# Patient Record
Sex: MALE | Race: WHITE | NOT HISPANIC OR LATINO | Employment: OTHER | URBAN - METROPOLITAN AREA
[De-identification: names, ages, dates, MRNs, and addresses within clinical notes are randomized per-mention and may not be internally consistent; named-entity substitution may affect disease eponyms.]

---

## 2017-01-07 ENCOUNTER — GENERIC CONVERSION - ENCOUNTER (OUTPATIENT)
Dept: OTHER | Facility: OTHER | Age: 77
End: 2017-01-07

## 2017-01-08 LAB
CREATININE, URINE (HISTORICAL): 205 MG/DL
PROT/CREAT UR: 233 MG/G CREAT (ref 0–200)
TOTAL PROTEIN (HISTORICAL): 47.8 MG/DL

## 2017-01-11 ENCOUNTER — GENERIC CONVERSION - ENCOUNTER (OUTPATIENT)
Dept: OTHER | Facility: OTHER | Age: 77
End: 2017-01-11

## 2017-01-11 ENCOUNTER — ALLSCRIPTS OFFICE VISIT (OUTPATIENT)
Dept: OTHER | Facility: OTHER | Age: 77
End: 2017-01-11

## 2017-01-25 DIAGNOSIS — N18.30 CHRONIC KIDNEY DISEASE, STAGE III (MODERATE) (HCC): ICD-10-CM

## 2017-01-25 DIAGNOSIS — M10.9 GOUT: ICD-10-CM

## 2017-01-25 LAB
BUN SERPL-MCNC: 17 MG/DL (ref 8–27)
BUN/CREA RATIO (HISTORICAL): 14 (ref 10–22)
CALCIUM SERPL-MCNC: 9.2 MG/DL (ref 8.6–10.2)
CHLORIDE SERPL-SCNC: 103 MMOL/L (ref 96–106)
CO2 SERPL-SCNC: 23 MMOL/L (ref 18–29)
CREAT SERPL-MCNC: 1.22 MG/DL (ref 0.76–1.27)
EGFR AFRICAN AMERICAN (HISTORICAL): 66 ML/MIN/1.73
EGFR-AMERICAN CALC (HISTORICAL): 57 ML/MIN/1.73
GLUCOSE SERPL-MCNC: 122 MG/DL (ref 65–99)
POTASSIUM SERPL-SCNC: 4.9 MMOL/L (ref 3.5–5.2)
SODIUM SERPL-SCNC: 141 MMOL/L (ref 134–144)

## 2017-02-02 ENCOUNTER — GENERIC CONVERSION - ENCOUNTER (OUTPATIENT)
Dept: OTHER | Facility: OTHER | Age: 77
End: 2017-02-02

## 2017-03-13 ENCOUNTER — ALLSCRIPTS OFFICE VISIT (OUTPATIENT)
Dept: OTHER | Facility: OTHER | Age: 77
End: 2017-03-13

## 2017-03-17 ENCOUNTER — ALLSCRIPTS OFFICE VISIT (OUTPATIENT)
Dept: OTHER | Facility: OTHER | Age: 77
End: 2017-03-17

## 2017-06-13 ENCOUNTER — ALLSCRIPTS OFFICE VISIT (OUTPATIENT)
Dept: OTHER | Facility: OTHER | Age: 77
End: 2017-06-13

## 2017-06-15 ENCOUNTER — GENERIC CONVERSION - ENCOUNTER (OUTPATIENT)
Dept: OTHER | Facility: OTHER | Age: 77
End: 2017-06-15

## 2017-06-15 LAB
BUN SERPL-MCNC: 16 MG/DL (ref 8–27)
BUN/CREA RATIO (HISTORICAL): 12 (ref 10–24)
CALCIUM SERPL-MCNC: 9.4 MG/DL (ref 8.6–10.2)
CHLORIDE SERPL-SCNC: 100 MMOL/L (ref 96–106)
CO2 SERPL-SCNC: 25 MMOL/L (ref 18–29)
CREAT SERPL-MCNC: 1.31 MG/DL (ref 0.76–1.27)
EGFR AFRICAN AMERICAN (HISTORICAL): 60 ML/MIN/1.73
EGFR-AMERICAN CALC (HISTORICAL): 52 ML/MIN/1.73
GLUCOSE SERPL-MCNC: 126 MG/DL (ref 65–99)
PHOSPHATE SERPL-MCNC: 2.7 MG/DL (ref 2.5–4.5)
POTASSIUM SERPL-SCNC: 4.7 MMOL/L (ref 3.5–5.2)
SODIUM SERPL-SCNC: 139 MMOL/L (ref 134–144)

## 2017-06-16 LAB
BACTERIA UR QL AUTO: ABNORMAL
BILIRUB UR QL STRIP: NEGATIVE
COLOR UR: YELLOW
COMMENT (HISTORICAL): CLEAR
CREATININE, URINE (HISTORICAL): 179.9 MG/DL
FECAL OCCULT BLOOD DIAGNOSTIC (HISTORICAL): NEGATIVE
GLUCOSE (HISTORICAL): NEGATIVE
KETONES UR STRIP-MCNC: NEGATIVE MG/DL
LEUKOCYTE ESTERASE UR QL STRIP: ABNORMAL
MICROSCOPIC EXAMINATION (HISTORICAL): ABNORMAL
NITRITE UR QL STRIP: NEGATIVE
NON-SQ EPI CELLS URNS QL MICRO: ABNORMAL /HPF
PH UR STRIP.AUTO: 6.5 [PH] (ref 5–7.5)
PROT UR STRIP-MCNC: ABNORMAL MG/DL
PROT/CREAT UR: 140 MG/G CREAT (ref 0–200)
RBC (HISTORICAL): ABNORMAL /HPF
SP GR UR STRIP.AUTO: 1.02 (ref 1–1.03)
TOTAL PROTEIN (HISTORICAL): 25.1 MG/DL
UROBILINOGEN UR QL STRIP.AUTO: 0.2 EU/DL (ref 0.2–1)
WBC # BLD AUTO: ABNORMAL /HPF

## 2017-06-27 ENCOUNTER — ALLSCRIPTS OFFICE VISIT (OUTPATIENT)
Dept: OTHER | Facility: OTHER | Age: 77
End: 2017-06-27

## 2017-08-21 ENCOUNTER — ALLSCRIPTS OFFICE VISIT (OUTPATIENT)
Dept: OTHER | Facility: OTHER | Age: 77
End: 2017-08-21

## 2017-08-21 ENCOUNTER — GENERIC CONVERSION - ENCOUNTER (OUTPATIENT)
Dept: OTHER | Facility: OTHER | Age: 77
End: 2017-08-21

## 2017-09-13 ENCOUNTER — ALLSCRIPTS OFFICE VISIT (OUTPATIENT)
Dept: OTHER | Facility: OTHER | Age: 77
End: 2017-09-13

## 2017-11-22 ENCOUNTER — ALLSCRIPTS OFFICE VISIT (OUTPATIENT)
Dept: OTHER | Facility: OTHER | Age: 77
End: 2017-11-22

## 2017-11-23 NOTE — PROGRESS NOTES
Assessment  1  Acquired ankle/foot deformity (736 70) (M21 969)   2  Onychomycosis (110 1) (B35 1)   3  Difficulty in walking (719 7) (R26 2)    Plan     · Follow-up visit in 9 weeks Evaluation and Treatment  Follow-up  Status: Hold For -Scheduling  Requested for: 72WYQ6352   · There are many things you can do at home to ensure good foot health ; Status:Complete;  Done: 90WDE1287 11:09AM   · Wear shoes that give your toes plenty of room ; Status:Complete;   Done: 87KHQ301901:18IC    Discussion/Summary  The patient, patient's family was counseled regarding instructions for management,-- patient and family education  Patient is able to Self-Care  The treatment plan was reviewed with the patient/guardian  The patient/guardian understands and agrees with the treatment plan      Chief Complaint  Patient has pain in both feet  History of Present Illness  HPI: Agent has continued bilateral lower extremity pain  He is taking Lyrica  He takes Percocet when necessary  He has back pain  His legs feel heavy and weak  Review of Systems   Constitutional: no fever-- and-- no chills  ENT: no nasal discharge  Cardiovascular: no palpitations  Gastrointestinal: no constipation  Genitourinary: no urinary hesitancy  Integumentary: no rashes  Neurological: no headache-- and-- no dizziness  Other Symptoms: anxiety insomnia  Active Problems    1  Abrasion (919 0) (T14 8XXA)   2  Acquired ankle/foot deformity (736 70) (M21 969)   3  Acute gout (274 01) (M10 9)   4  Acute gouty arthritis (274 01) (M10 9)   5  Allergic rhinitis (477 9) (J30 9)   6  Aneurysm of abdominal aorta (441 4) (I71 4)   7  Angina pectoris (413 9) (I20 9)   8  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) (F06 4)   9  Arteriosclerosis of coronary artery (414 00) (I25 10)   10  Arthralgia of ankle or foot (719 47) (M25 579)   11  Atherosclerosis of arteries of extremities (440 20) (I70 209)   12   Atrial fibrillation, unspecified type (427 31) (I48 91)   13  Atrial flutter (427 32) (I48 92)   14  Benign hypertension with CKD (chronic kidney disease) stage III (403 10,585 3)  (I12 9,N18 3)   15  Benign prostatic hypertrophy (600 00) (N40 0)   16  Bilateral edema of lower extremity (782 3) (R60 0)   17  Bilateral lumbar radiculopathy (724 4) (M54 16)   18  Blurred vision (368 8) (H53 8)   19  Body mass index (BMI) of 45 0 to 49 9 in adult (V85 42) (Z68 42)   20  Callus (700) (L84)   21  Carpal tunnel syndrome, unspecified laterality (354 0) (G56 00)   22  Cellulitis (682 9) (L03 90)   23  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)   24  Chronic low back pain (724 2,338 29) (M54 5,G89 29)   25  Chronic obstructive pulmonary disease (496) (J44 9)   26  Chronic pain syndrome (338 4) (G89 4)   27  CKD (chronic kidney disease), stage III (585 3) (N18 3)   28  Colon cancer screening (V76 51) (Z12 11)   29  Colon, diverticulosis (562 10) (K57 30)   30  Congestive heart failure (428 0) (I50 9)   31  Coronary artery disease (414 00) (I25 10)   32  Deep venous insufficiency (459 81) (I87 2)   33  Degenerative disc disease, lumbar (722 52) (M51 36)   34  Difficulty in walking (719 7) (R26 2)   35  Encounter for special screening examination for genitourinary disorder (V81 6) (Z13 89)   36  Fecal soiling (787 62) (R15 1)   37  Fibromyalgia (729 1) (M79 7)   38  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 3)   39  Gastrointestinal symptoms (787 99) (R19 8)   40  Gout (274 9) (M10 9)   41  Hyperkalemia (276 7) (E87 5)   42  Hyperlipidemia (272 4) (E78 5)   43  Insomnia, persistent (307 42) (G47 00)   44  Lumbar canal stenosis (724 02) (M48 061)   45  Lumbar neuritis (724 4) (M54 16)   46  Memory loss (780 93) (R41 3)   47  Microscopic hematuria (599 72) (R31 29)   48  Moderate or severe vision impairment, one eye (369 60) (H54 40)   49  Morbid or severe obesity due to excess calories (278 01) (E66 01)   50   Morbid or severe obesity due to excess calories (278 01) (E66 01)   51  Needs flu shot (V04 81) (Z23)   52  Nicotine dependence (305 1) (F17 200)   53  Olecranon bursitis (726 33) (M70 20)   54  Onychogryphosis (703 8)   55  Onychomycosis (110 1) (B35 1)   56  Osteoarthritis, knee/lower leg (715 96) (M17 9)   57  Pacemaker Permanent Placement Dual-Chamber   58  Pain in joints of both feet (719 47) (M79 671,M79 672)   59  Paronychia of toe, unspecified laterality (681 11) (L03 039)   60  Pes planus, congenital (754 61) (Q66 50)   61  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)   62  Presence of cardiac pacemaker (V45 01) (Z95 0)   63  Proteinuria (791 0) (R80 9)   64  Radiculopathy (729 2) (M54 10)   65  Screening for genitourinary condition (V81 6) (Z13 89)   66  Skin tear of right lower leg without complication, initial encounter (891 0) (S81 811A)   67  Sleep apnea (780 57) (G47 30)   68  Sleep apnea (780 57) (G47 30)   69  Tenderness in limb (729 5) (M79 609)   70  Tendonitis (726 90) (M77 9)   71  Tinea corporis (110 5) (B35 4)   72  Urinary incontinence (788 30) (R32)   73  Venous insufficiency (chronic) (peripheral) (459 81) (I87 2)    Past Medical History   · History of Atrial flutter (427 32) (I48 92)   · History of Cellulitis of foot (682 7) (L03 119)   · History of Colon cancer screening (V76 51) (Z12 11)   · History of Difficulty in walking (719 7) (R26 2)   · History of Difficulty in walking (719 7) (R26 2)   · General medical examination (V70 9) (Z00 00)   · History of backache (V13 59) (Z87 39)   · History of radiculopathy (V12 49) (Z86 69)   · 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)    The active problems and past medical history were reviewed and updated today  Surgical History   · History of Cath Stent Placement   · History of Cholecystectomy   · History of Knee Surgery   · Pacemaker Permanent Placement Dual-Chamber   · History of Permanent Pacemaker Implantation Date    The surgical history was reviewed and updated today         Family History  Mother    · Family history of Cancer   · Family history of Family Health Status Of Mother -    · Family history of Heart Disease (V17 49)   · Family history of Hypertension (V17 49)   · Family history of Mother  At Age 80  Father    · Family history of Father  At Age 47   · Family history of Heart Disease (V17 49)  Brother    · Family history of Heart Disease (V17 49)    The family history was reviewed and updated today  Social History     · Denied: Alcohol Use (History)   · Being A Social Drinker   · Cigarette smoker (305 1) (F17 210)   · Current Every Day Smoker (305 1)   · Current Smoker (V1 82)   · Daily Tea Consumption (10  Cups/Day)   · Denied: Drug Use (305 90)   · Marital History - Currently    · Recently Stopping Smoking ()  The social history was reviewed and updated today  The social history was reviewed and is unchanged  Current Meds   1  Allopurinol 100 MG Oral Tablet; TAKE ONE TABLET BY MOUTH EVERY DAY FOR GOUT; Therapy: 91WKH5826 to (Evaluate:34Rfm1818)  Requested for: 2017; Last Rx:2017 Ordered   2  Amoxicillin 500 MG Oral Tablet; 4 po 1 hour prior to procedure  Requested for: 17HHL6131; Last Rx:2017 Ordered   3  B Complex Oral Capsule; TAKE 1 CAPSULE DAILY; Therapy: (Recorded:2014) to Recorded   4  Betamethasone Dipropionate 0 05 % External Cream; APPLY SPARINGLY TO AFFECTED AREA(S) TWICE DAILY; Therapy: 44Zpl6794 to (Last Rx:66Xaq4270)  Requested for: 61Tkl6536 Ordered   5  Biotin 1000 MCG Oral Tablet; Take 1 tablet daily; Therapy: (Recorded:2014) to Recorded   6  Calcium-Carb 600 600 MG Oral Tablet; TAKE 2 TABLETS DAILY; Therapy: 93DKP2684 to  Requested for: 97Hrr8912 Recorded   7  Centrum Silver Ultra Mens Oral Tablet; 1 tab daily; Therapy: 86DSW1166 to  Requested for: 33PBM0770 Recorded   8  Cinnamon 500 MG Oral Capsule; Take 1 capsule twice daily; Therapy: (Recorded:2014) to Recorded   9   ClonazePAM 1 MG Oral Tablet Disintegrating; PLACE 1 TABLET ON TONGUE AND ALLOW TO DISSOLVE TWICE DAILY AS NEEDED; Therapy: 05SJX9989 to (Evaluate:12Hms1604); Last Rx:41Ope1804 Ordered   10  Clopidogrel Bisulfate 75 MG Oral Tablet; take one tablet by mouth every day; Therapy: 07TKD2580 to (Evaluate:38Ehg3993)  Requested for: 11Aug2017; Last  Rx:11Aug2017 Ordered   11  Clotrimazole 1 % External Cream; APPLY 2-3 TIMES DAILY TO AFFECTED AREA(S); Therapy: 51Pbd0763 to (Last Rx:47Uvl9506)  Requested for: 70Nlk8307 Ordered   12  Colcrys 0 6 MG Oral Tablet; TAKE 1 TABLET DAILY AS DIRECTED; Therapy: 27AQF0159 to (Evaluate:21Jun2016); Last Rx:24Mar2016 Ordered   13  Cranberry Fruit Concentrate 94864 MG Oral Capsule; Take 1 capsule twice daily  Recorded   14  [employer] Fish Oil CAPS; TAKE 1 CAPSULE Daily Recorded   15  Donepezil HCl - 5 MG Oral Tablet; 1 every day; Therapy: 32Dgn8258 to (Last Rx:21Aug2017)  Requested for: 94Mev0071 Ordered   16  Felodipine ER 5 MG Oral Tablet Extended Release 24 Hour; take 1 tablet by mouth once  daily; Therapy: 84IZF3475 to (YBHUIOSE:03KZL1937)  Requested for: 24Jul2017; Last  Rx:85Wcq3412 Ordered   17  Finasteride 5 MG Oral Tablet; 1 DAILY; Therapy: 64Qsp6445 to (Last Rx:19Jrr7881)  Requested for: 99Tdx5389 Ordered   18  Flax Seed Oil 1000 MG Oral Capsule; 2 tabs daily; Therapy: 71IWT3986 to  Requested for: 19EGX6762 Recorded   19  Ginkgo Biloba 120 MG Oral Capsule; Take 1 capsule twice daily; Therapy: (Recorded:21Apr2014) to Recorded   20  Glucosamine 1500 Complex Oral Capsule; TAKE 2 CAPSULE Twice daily; Therapy: (Recorded:06Ykw2671) to Recorded   21  Lactaid 4500 UNIT CHEW; CHEW AND SWALLOW 1 TO 3 TABLETS WITH FIRST BITE  OF MILK FOOD; Therapy: (Recorded:21Apr2014) to Recorded   22  Magnesium 500 MG Oral Capsule; take 1 capsule daily; Therapy: (Recorded:21Apr2014) to Recorded   23   Nitrostat 0 4 MG Sublingual Tablet Sublingual; DISSOLVE 1 TABLET UNDER THE  TONGUE AS NEEDED FOR CHEST PAIN;  Therapy: 87PGA4670 to (789 690 620)  Requested for: 25Lhz3817; Last  Rx:76Cnh5869 Ordered   24  PARoxetine HCl - 20 MG Oral Tablet; 1 EVERY MORNING; Therapy: 81PMU2948 to (Last Rx:99Vle2579)  Requested for: 54Dgj7826 Ordered   25  ProAir  (90 Base) MCG/ACT Inhalation Aerosol Solution; 2 puffs Q4 hours prn  SOB/wheeze; Therapy: 80SJA0867 to (Last Rx:19Nov2013)  Requested for: 42PCN0792 Ordered   26  Selenium  MCG Oral Tablet Extended Release; 1 tab daily; Therapy: 26KSN2848 to  Requested for: 33XFY6037 Recorded   27  Silver Sulfadiazine 1 % External Cream; APPLY TO AFFECTED AREA TWICE DAILY AS  DIRECTED; Therapy: 56FNM5266 to (Last Rx:06Apr2016)  Requested for: 40Ooa1237 Ordered   28  Simvastatin 20 MG Oral Tablet; 1 TAB DAILY; Therapy: 63IHO9990 to (Last Rx:80Zjq6399)  Requested for: 15Dlg1266 Ordered   29  TraMADol HCl - 50 MG Oral Tablet; TAKE 1 TABLET 4 TIMES DAILY AS NEEDED FOR  PAIN; Last Rx:61Cwq5655 Ordered   30  Vitamin C 500 MG Oral Tablet; TAKE 1 TABLET DAILY; Therapy: (Recorded:21Apr2014) to Recorded   31  Vitamin D 1000 UNIT Oral Tablet; TAKE 1 TABLET DAILY; Therapy: (Recorded:08Apr2014) to Recorded   32  Xarelto 20 MG Oral Tablet; One a day; Therapy: 39XOE0177 to (Last Rx:16Qaa1636) Ordered   33  Zinc 50 MG Oral Tablet; TAKE 1 TABLET DAILY; Therapy: (Recorded:08Apr2014) to Recorded    The medication list was reviewed and updated today  Allergies  1  No Known Drug Allergies    Vitals   Recorded: 22Nov2017 10:52AM   Heart Rate 86   Respiration 20   Systolic 121   Diastolic 84   Height 6 ft    Weight 360 lb    BMI Calculated 48 83   BSA Calculated 2 73     Physical Exam  Left Foot: Appearance: Normal except as noted: excessive pronation-- and-- pes planus  Second toe deformities include hammer toe  Third toe deformities include hammer toe  Forth toe deformities include hammer toe  Fifth toe deformities include hammer toe   Tenderness: None except the great toe,-- distal first metatarsal,-- distal fifth metatarsal-- and-- insertion of the plantar fascia  Positive Brian sign third left intermetatarsal space  ROM: Full  Motor: Normal   Right Foot: Appearance: Normal except as noted: excessive pronation-- and-- pes planus  Second toe deformities include hammer toe  Third toe deformities include hammer toe  Forth toe deformities include hammer toe  Fifth toe deformities include hammer toe  Evaluation of the great toe nail demonstrates an ingrown nail  Tenderness: None except the insertion of the plantar fascia  Left Ankle: Appearance: Normal except erythema,-- swelling-- and-- swelling laterally  Tenderness: None except the tibiotalar joint  Right Ankle: ROM: Full  Neurological Exam: performed  Deep tendon reflexes: + tinel of right tibial nerve  Vascular Exam: performed Dorsalis pedis pulses were present bilaterally  Posterior tibial pulses were present bilaterally  Elevation Pallor: absent bilaterally  Capillary refill time was less than 1 second bilaterally  Edema: moderate bilaterally-- and-- 4/7 pitting  neg  buddy  Toenails: All of the toenails were elongated,-- hypertrophied,-- discolored,-- ingrown,-- shown to have subungual debris,-- tender-- and-- Mycotic with onychauxis  Hyperkeratosis: present on both first toes,-- present on both first sub metatarsals-- and-- present on both fifth sub metatarsals  Shoe Gear Evaluation: performed ()  Recommendation(s): extra depth diabetic shoes  Procedure  All abnormal skin a mycotic nails debrided without complication her pain  Gentian violet applied to webspace is      Future Appointments    Date/Time Provider Specialty Site   01/31/2018 11:30 AM Elliott Heaton DPM Podiatry [facility]   12/21/2017 08:30 AM NICK Ch   Nephrology [facility] NEPHROLOGY [facility]       Signatures   Electronically signed by : Genesis Cardenas DPM; Nov 22 2017 11:11AM EST                       (Author)

## 2017-12-01 DIAGNOSIS — N18.30 CHRONIC KIDNEY DISEASE, STAGE III (MODERATE) (HCC): ICD-10-CM

## 2017-12-11 ENCOUNTER — GENERIC CONVERSION - ENCOUNTER (OUTPATIENT)
Dept: OTHER | Facility: OTHER | Age: 77
End: 2017-12-11

## 2017-12-11 LAB
A/G RATIO (HISTORICAL): 1.3 (ref 1.2–2.2)
ALBUMIN SERPL BCP-MCNC: 4.2 G/DL (ref 3.5–4.8)
ALP SERPL-CCNC: 63 IU/L (ref 39–117)
ALT SERPL W P-5'-P-CCNC: 23 IU/L (ref 0–44)
AST SERPL W P-5'-P-CCNC: 22 IU/L (ref 0–40)
BILIRUB SERPL-MCNC: 0.7 MG/DL (ref 0–1.2)
BUN SERPL-MCNC: 19 MG/DL (ref 8–27)
BUN/CREA RATIO (HISTORICAL): 13 (ref 10–24)
CALCIUM SERPL-MCNC: 9.5 MG/DL (ref 8.6–10.2)
CHLORIDE SERPL-SCNC: 101 MMOL/L (ref 96–106)
CHOLEST SERPL-MCNC: 137 MG/DL (ref 100–199)
CHOLEST/HDLC SERPL: 3.3 RATIO UNITS (ref 0–5)
CO2 SERPL-SCNC: 25 MMOL/L (ref 18–29)
CREAT SERPL-MCNC: 1.46 MG/DL (ref 0.76–1.27)
EGFR AFRICAN AMERICAN (HISTORICAL): 53 ML/MIN/1.73
EGFR-AMERICAN CALC (HISTORICAL): 46 ML/MIN/1.73
GLUCOSE SERPL-MCNC: 129 MG/DL (ref 65–99)
HDLC SERPL-MCNC: 41 MG/DL
INTERPRETATION (HISTORICAL): NORMAL
INTERPRETATION (HISTORICAL): NORMAL
LDLC SERPL CALC-MCNC: 76 MG/DL (ref 0–99)
PDF IMAGE (HISTORICAL): NORMAL
POTASSIUM SERPL-SCNC: 5.3 MMOL/L (ref 3.5–5.2)
SODIUM SERPL-SCNC: 143 MMOL/L (ref 134–144)
TOT. GLOBULIN, SERUM (HISTORICAL): 3.3 G/DL (ref 1.5–4.5)
TOTAL PROTEIN (HISTORICAL): 7.5 G/DL (ref 6–8.5)
TRIGL SERPL-MCNC: 102 MG/DL (ref 0–149)
VLDLC SERPL CALC-MCNC: 20 MG/DL (ref 5–40)

## 2017-12-12 ENCOUNTER — GENERIC CONVERSION - ENCOUNTER (OUTPATIENT)
Dept: OTHER | Facility: OTHER | Age: 77
End: 2017-12-12

## 2017-12-21 ENCOUNTER — ALLSCRIPTS OFFICE VISIT (OUTPATIENT)
Dept: OTHER | Facility: OTHER | Age: 77
End: 2017-12-21

## 2017-12-22 NOTE — PROGRESS NOTES
Assessment   1  Benign hypertension with CKD (chronic kidney disease) stage III (403 10,585 3)     (I12 9,N18 3)   2  Bilateral edema of lower extremity (782 3) (R60 0)   3  CKD (chronic kidney disease), stage III (585 3) (N18 3)   4  Hyperkalemia (276 7) (E87 5)   5  Proteinuria (791 0) (R80 9)    Plan   Bilateral edema of lower extremity    · Furosemide 40 MG Oral Tablet; Take 1 tablet daily as needed for leg edema or    dyspnea   Rx By: Grace Wang; Dispense: 30 Days ; #:30 Tablet; Refill: 3;For: Bilateral edema of lower extremity; TYREL = N; Verified Transmission to [facility]  [phone] #[phone]; Last Updated By: System, SureScripts; 2017 9:08:16 AM  CKD (chronic kidney disease), stage III    · (1) BASIC METABOLIC PROFILE; Status:Active; Requested for:2018; Perform:[facility]; [id number]; For:CKD (chronic kidney disease), stage III; Ordered By:Mendez Martinez;   · (1) CBC/ PLT (NO DIFF); Status:Active; Requested for:2018; Perform:[facility]; KIR:[id number];YXTRNDH; For:CKD (chronic kidney disease), stage III; Ordered By:Mendez Martinez;   · (1) MAGNESIUM; Status:Active; Requested for:2018; Perform:[facility]; DBV:[id number];LBDFPOG; For:CKD (chronic kidney disease), stage III; Ordered By:Mendez Martinez;   · (1) PHOSPHORUS; Status:Active; Requested for:2018; Perform:[facility]; LGC:[id number]; For:CKD (chronic kidney disease), stage III; Ordered By:Mendez Martinez;   · (1) PTH N-TERMINAL (INTACT); Status:Active; Requested for:2018; Perform:[facility]; [id number];UKHZKBV; For:CKD (chronic kidney disease), stage III; Ordered By:Mendez Martinez;   · (1) URINALYSIS (will reflex a microscopy if leukocytes, occult blood, protein or nitrites are    not within normal limits); Status:Active; Requested for:2018; Perform:[facility]; IQW:[id number];UTJXKNA; For:CKD (chronic kidney disease), stage III; Ordered By:Mendez Martinez;   · (1) URINE PROTEIN CREATININE RATIO; Status:Active; Requested for:12Mar2018; Perform:[facility]; CMK:[id number];SIPNDVM; For:CKD (chronic kidney disease), stage III; Ordered By:Mendez Martinez;   · (1) VITAMIN D 25-HYDROXY; Status:Active; Requested for:12Mar2018; Perform:[facility]; PUZ:[id number];UWWMMDW; For:CKD (chronic kidney disease), stage III; Ordered By:Mendez Martinez;  Unlinked    · Cinnamon 500 MG Oral Capsule   Dispense: 0 Days ; #: Sufficient Capsule; Refill: 0; TYREL = N; Record; Last Updated By: Manuel Horton; 12/21/2017 9:04:12 AM   · Selenium  MCG Oral Tablet Extended Release   Rx By: oTny Spangler; TYREL = N; ; Last Updated By: Manuel Horton; 12/21/2017 9:04:11 AM   · Zinc 50 MG Oral Tablet   Dispense: 0 Days ; #: Sufficient Tablet; Refill: 0; TYREL = N; Record; Last Updated By: Manuel Horton; 12/21/2017 9:04:11 AM    Discussion/Summary      CK D stage III, baseline serum creatinine 1 2-1 5 Last serum creatinine 1 4 overall stable and at baseline  urinalysis in June 2017 shows no hematuria, trace proteinuria  CK D likely secondary to long-term hypertension causing hypertensive nephrosclerosis, morbid obesity Avoid nephrotoxins or NSAIDs  last  mg overall nonsignificant in June 2017  Could be secondary to underlying hypertension although cannot rule out obesity related FSGS component Consider ACE inhibitor if proteinuria gets worse  Repeat UPC ratio before next visit  Check urinalysis   blood pressure controlled in the office today  He takes Lasix as needed basis     Salt restricted diet Continue to avoid licorice   edema, overall stable  Continue Lasix as needed basis  Weight overall stable  serum potassium 5 3 low-potassium diet  patient to take Lasix 40 mg p  o  daily for next three days and then continue as p r n  BMP before next visit      The patient was counseled regarding instructions for management,-- risk factor reductions,-- importance of compliance with treatment  Possible side effects of new medications were reviewed with the patient/guardian today  The treatment plan was reviewed with the patient/guardian  The patient/guardian understands and agrees with the treatment plan      Reason For Visit   Regular follow up for CKD      History of Present Illness   Patient is 51-year-old male with significant medical issues of hypertension for many years, CAD, status post PTCA, status post pacemaker placement, CK D stage III with baseline creatinine in the 1 2-1 5,, gout, comes for regular follow-up of CKD  Last serum creatinine 1 4 overall at baseline  Since last visit, patient denies any new urinary complaint  He denies any worsening dyspnea  Patient has chronic intermittent exertional dyspnea which is overall stable  He has occasional wheezing episodes and takes inhaler  Patient is unable to do exercise due to knee pain issues  He denies any fever or chills  Leg edema is overall stable  No recent gout attack  No recent NSAID exposure  Review of Systems        Constitutional: no fever,-- no chills-- and-- no fatigue  Integumentary: no rashes  Gastrointestinal: no abdominal pain,-- no nausea,-- no diarrhea-- and-- no vomiting  Respiratory: shortness of breath-- and-- stable exertional dyspnea, but-- no cough  Cardiovascular: stable edema-- and-- lower extremity edema, but-- no orthopnea,-- no chest pain-- and-- no palpitations  Musculoskeletal: no joint pain  Neurological: no headache,-- no lightheadedness-- and-- no dizziness  Genitourinary: no dysuria,-- no hematuria,-- no nocturia-- and-- no change in urinary frequency  Eyes: no dryness of the eyes  ENT: no nasal discharge  Psychiatric: no anxiety-- and-- no depression  Active Problems   1  Abrasion (919 0) (T14 8XXA)   2  Acquired ankle/foot deformity (736 70) (M21 969)   3   Acute gout (274 01) (M10 9) 4  Acute gouty arthritis (274 01) (M10 9)   5  Allergic rhinitis (477 9) (J30 9)   6  Aneurysm of abdominal aorta (441 4) (I71 4)   7  Angina pectoris (413 9) (I20 9)   8  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) (F06 4)   9  Arteriosclerosis of coronary artery (414 00) (I25 10)   10  Arthralgia of ankle or foot (719 47) (M25 579)   11  Atherosclerosis of arteries of extremities (440 20) (I70 209)   12  Atrial fibrillation, unspecified type (427 31) (I48 91)   13  Atrial flutter (427 32) (I48 92)   14  Benign hypertension with CKD (chronic kidney disease) stage III (403 10,585 3)      (I12 9,N18 3)   15  Benign prostatic hypertrophy (600 00) (N40 0)   16  Bilateral edema of lower extremity (782 3) (R60 0)   17  Bilateral lumbar radiculopathy (724 4) (M54 16)   18  Blurred vision (368 8) (H53 8)   19  Body mass index (BMI) of 45 0 to 49 9 in adult (V85 42) (Z68 42)   20  Callus (700) (L84)   21  Carpal tunnel syndrome, unspecified laterality (354 0) (G56 00)   22  Cellulitis (682 9) (L03 90)   23  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)   24  Chronic low back pain (724 2,338 29) (M54 5,G89 29)   25  Chronic obstructive pulmonary disease (496) (J44 9)   26  Chronic pain syndrome (338 4) (G89 4)   27  CKD (chronic kidney disease), stage III (585 3) (N18 3)   28  Colon cancer screening (V76 51) (Z12 11)   29  Colon, diverticulosis (562 10) (K57 30)   30  Congestive heart failure (428 0) (I50 9)   31  Coronary artery disease (414 00) (I25 10)   32  Deep venous insufficiency (459 81) (I87 2)   33  Degenerative disc disease, lumbar (722 52) (M51 36)   34  Difficulty in walking (719 7) (R26 2)   35  Encounter for special screening examination for genitourinary disorder (V81 6) (Z13 89)   36  Fecal soiling (787 62) (R15 1)   37  Fibromyalgia (729 1) (M79 7)   38  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 3)   39  Gastrointestinal symptoms (787 99) (R19 8)   40  Gout (274 9) (M10 9)   41   Hyperkalemia (276 7) (E87 5) 42  Hyperlipidemia (272 4) (E78 5)   43  Insomnia, persistent (307 42) (G47 00)   44  Lumbar canal stenosis (724 02) (M48 061)   45  Lumbar neuritis (724 4) (M54 16)   46  Memory loss (780 93) (R41 3)   47  Microscopic hematuria (599 72) (R31 29)   48  Moderate or severe vision impairment, one eye (369 60) (H54 40)   49  Morbid or severe obesity due to excess calories (278 01) (E66 01)   50  Morbid or severe obesity due to excess calories (278 01) (E66 01)   51  Needs flu shot (V04 81) (Z23)   52  Nicotine dependence (305 1) (F17 200)   53  Olecranon bursitis (726 33) (M70 20)   54  Onychogryphosis (703 8)   55  Onychomycosis (110 1) (B35 1)   56  Osteoarthritis, knee/lower leg (715 96) (M17 9)   57  Pacemaker Permanent Placement Dual-Chamber   58  Pain in joints of both feet (719 47) (M79 671,M79 672)   59  Paronychia of toe, unspecified laterality (681 11) (L03 039)   60  Pes planus, congenital (754 61) (Q66 50)   61  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)   62  Presence of cardiac pacemaker (V45 01) (Z95 0)   63  Proteinuria (791 0) (R80 9)   64  Radiculopathy (729 2) (M54 10)   65  Screening for genitourinary condition (V81 6) (Z13 89)   66  Skin tear of right lower leg without complication, initial encounter (891 0) (S81 811A)   67  Sleep apnea (780 57) (G47 30)   68  Sleep apnea (780 57) (G47 30)   69  Tenderness in limb (729 5) (M79 609)   70  Tendonitis (726 90) (M77 9)   71  Tinea corporis (110 5) (B35 4)   72  Urinary incontinence (788 30) (R32)   73  Venous insufficiency (chronic) (peripheral) (459 81) (I87 2)    Past Medical History   1  History of Atrial flutter (427 32) (I48 92)   2  History of Cellulitis of foot (682 7) (L03 119)   3  History of Colon cancer screening (V76 51) (Z12 11)   4  History of Difficulty in walking (719 7) (R26 2)   5  History of Difficulty in walking (719 7) (R26 2)   6  General medical examination (V70 9) (Z00 00)   7  History of backache (V13 59) (Z87 39)   8   History of radiculopathy (V12 49) (Z86 69)   9  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)     The active problems and past medical history were reviewed and updated today  Surgical History   1  History of Cath Stent Placement   2  History of Cholecystectomy   3  History of Knee Surgery   4  Pacemaker Permanent Placement Dual-Chamber   5  History of Permanent Pacemaker Implantation Date     The surgical history was reviewed and updated today  Family History   Mother    1  Family history of Cancer   2  Family history of Family Health Status Of Mother -    3  Family history of Heart Disease (V17 49)   4  Family history of Hypertension (V17 49)   5  Family history of Mother  At Age 80  Father    10  Family history of Father  At Age 51   10  Family history of Heart Disease (V17 49)  Brother    8  Family history of Heart Disease (V17 49)     The family history was reviewed and updated today  Social History    · Denied: Alcohol Use (History)   · Being A Social Drinker   · Cigarette smoker (305 1) (F17 210)   · Current Every Day Smoker (305 1)   · Current Smoker (V15 82)   · Daily Tea Consumption (10  Cups/Day)   · Denied: Drug Use (305 90)   · Marital History - Currently    · Recently Stopping Smoking (V1 82)  The social history was reviewed and updated today  The social history was reviewed and is unchanged  Current Meds    1  Allopurinol 100 MG Oral Tablet; TAKE ONE TABLET BY MOUTH EVERY DAY FOR GOUT;     Therapy: 77KYA8310 to (Evaluate:17Jqa9499)  Requested for: 2017; Last     Rx:2017 Ordered   2  Amoxicillin 500 MG Oral Tablet; 4 po 1 hour prior to procedure  Requested for:     21JYC8246; Last Rx:2017 Ordered   3  B Complex Oral Capsule; TAKE 1 CAPSULE DAILY; Therapy: (Recorded:2014) to Recorded   4  Betamethasone Dipropionate 0 05 % External Cream; APPLY SPARINGLY TO AFFECTED     AREA(S) TWICE DAILY;      Therapy: 60Pvs1375 to (Last Rx:72Jsw1754)  Requested for: 08Opd3593 Ordered   5  Biotin 1000 MCG Oral Tablet; Take 1 tablet daily; Therapy: (Recorded:08Apr2014) to Recorded   6  Calcium-Carb 600 600 MG TABS; TAKE 2 TABLETS DAILY; Therapy: 53XIX0282 to  Requested for: 19Dzi3499 Recorded   7  Centrum Silver Ultra Mens Oral Tablet; 1 tab daily; Therapy: 22RNB9571 to  Requested for: 30AXF2461 Recorded   8  Cinnamon 500 MG Oral Capsule; Take 1 capsule twice daily; Therapy: (Recorded:08Apr2014) to Recorded   9  ClonazePAM 1 MG Oral Tablet Disintegrating; PLACE 1 TABLET ON TONGUE AND     ALLOW TO DISSOLVE TWICE DAILY AS NEEDED; Therapy: 02JKS3164 to (Evaluate:16Sep2016); Last Rx:18Jul2016 Ordered   10  Clopidogrel Bisulfate 75 MG Oral Tablet; take one tablet by mouth every day; Therapy: 37YNB1975 to (Evaluate:72Vii6996)  Requested for: 11Aug2017; Last      Rx:11Aug2017 Ordered   11  Clotrimazole 1 % External Cream; APPLY 2-3 TIMES DAILY TO AFFECTED AREA(S); Therapy: 21Aug2017 to (Last Rx:21Aug2017)  Requested for: 44Xui8406 Ordered   12  Colcrys 0 6 MG Oral Tablet; TAKE 1 TABLET DAILY AS DIRECTED; Therapy: 66GFR4025 to (Evaluate:21Jun2016); Last Rx:24Mar2016 Ordered   13  Cranberry Fruit Concentrate 87894 MG Oral Capsule; Take 1 capsule twice daily      Recorded   14  [employer] Fish Oil CAPS; TAKE 1 CAPSULE Daily Recorded   15  Donepezil HCl - 5 MG Oral Tablet; 1 every day; Therapy: 30Ave9647 to (Last Rx:34Acy0316)  Requested for: 21Nps4144 Ordered   16  Felodipine ER 5 MG Oral Tablet Extended Release 24 Hour; take 1 tablet by mouth once      daily; Therapy: 65EPN6581 to (OLSPLDQX:98LHF2259)  Requested for: 17Yfg8859; Last      Rx:83Cep9485 Ordered   17  Finasteride 5 MG Oral Tablet; 1 DAILY; Therapy: 71Twm1808 to (Last Rx:82Wvn7817)  Requested for: 04Fab7899 Ordered   18  Flax Seed Oil 1000 MG Oral Capsule; 2 tabs daily; Therapy: 84MUJ3171 to  Requested for: 44JYF5106 Recorded   19  Ginkgo Biloba 120 MG Oral Capsule;  Take 1 capsule twice daily; Therapy: (Recorded:21Apr2014) to Recorded   20  Glucosamine 1500 Complex Oral Capsule; TAKE 2 CAPSULE Twice daily; Therapy: (Recorded:08Apr2014) to Recorded   21  Magnesium 500 MG Oral Capsule; take 1 capsule daily; Therapy: (Recorded:21Apr2014) to Recorded   22  Nitrostat 0 4 MG Sublingual Tablet Sublingual; DISSOLVE 1 TABLET UNDER THE      TONGUE AS NEEDED FOR CHEST PAIN;      Therapy: 50YYJ5349 to (Evaluate:29Oct2015)  Requested for: 99Ctd1245; Last      Rx:19Fuf0686 Ordered   23  PARoxetine HCl - 20 MG Oral Tablet; 1 EVERY MORNING; Therapy: 69WDA0168 to (Last Rx:55Uxb0527)  Requested for: 50Fzc3607 Ordered   24  ProAir  (90 Base) MCG/ACT Inhalation Aerosol Solution; 2 puffs Q4 hours prn      SOB/wheeze; Therapy: 71KBJ6463 to (Last Rx:48Ekb3683)  Requested for: 58BOO9128 Ordered   25  Selenium  MCG Oral Tablet Extended Release; 1 tab daily; Therapy: 71NOC1343 to  Requested for: 96KQD9215 Recorded   26  Silver Sulfadiazine 1 % External Cream; APPLY TO AFFECTED AREA TWICE DAILY AS      DIRECTED; Therapy: 98AKK4989 to (Last Rx:06Xhd1326)  Requested for: 53Gaz5331 Ordered   27  Simvastatin 20 MG Oral Tablet; 1 TAB DAILY; Therapy: 33RFN0394 to (Last Rx:29Rua7583)  Requested for: 87Axa0493 Ordered   28  TraMADol HCl - 50 MG Oral Tablet; TAKE 1 TABLET 4 TIMES DAILY AS NEEDED FOR      PAIN; Last Rx:56Xqt2404 Ordered   29  Vitamin C 500 MG Oral Tablet; TAKE 1 TABLET DAILY; Therapy: (Recorded:21Apr2014) to Recorded   30  Vitamin D 1000 UNIT Oral Tablet; TAKE 1 TABLET DAILY; Therapy: (Recorded:22Tgo6626) to Recorded   31  Xarelto 20 MG Oral Tablet; One a day; Therapy: 54XJJ3649 to (Last Rx:44Dql5068) Ordered   32  Zinc 50 MG Oral Tablet; TAKE 1 TABLET DAILY; Therapy: (Recorded:08Apr2014) to Recorded     The medication list was reviewed and updated today  Allergies   1   No Known Drug Allergies    Vitals   Vital Signs Recorded: 31Ibn7818 09:05AM Recorded: 40IMW7237 90:48MI   Systolic 664, RUE, Sitting    Diastolic 76, RUE, Sitting    Height  6 ft    Weight  361 lb    BMI Calculated  48 96   BSA Calculated  2 74     Physical Exam        Constitutional: General appearance: No acute distress, well appearing and well nourished  -- morbid obese  ENT: External ears and nose appear normal          Eyes: Anicteric sclerae  JVD:  No JVD present  Pulmonary: Respiratory effort: No increased work of breathing or signs of respiratory distress  -- Auscultation of lungs: Clear to auscultation  no rales or crackles were heard bilaterally  rhonchi over both midlung fields  no wheezing  no diminished breath sounds  Cardiovascular: Auscultation of heart: Normal rate and rhythm, normal S1 and S2, without murmurs  Abdomen: Non-tender, no masses  -- soft, obese, BS+nt  Extremities: Extremities are abnormal--   trace minimal edema in LE which is not significant  Rash: No rash present        Neurologic: Non Focal          Psychiatric: Orientation to person, place, and time: Normal  -- and-- Mood and affect: Normal        Results/Data   (1) COMPREHENSIVE METABOLIC PANEL 68PUV3719 03:66JM Fracisco Cade      Test Name Result Flag Reference   Glucose, Serum 129 mg/dL H 65-99   BUN 19 mg/dL  8-27   Creatinine, Serum 1 46 mg/dL H 0 76-1 27   BUN/Creatinine Ratio 13  10-24   Sodium, Serum 143 mmol/L  134-144   Potassium, Serum 5 3 mmol/L H 3 5-5 2   Chloride, Serum 101 mmol/L     Carbon Dioxide, Total 25 mmol/L  18-29   Calcium, Serum 9 5 mg/dL  8 6-10 2   Protein, Total, Serum 7 5 g/dL  6 0-8 5   Albumin, Serum 4 2 g/dL  3 5-4 8   Globulin, Total 3 3 g/dL  1 5-4 5   A/G Ratio 1 3  1 2-2 2   Bilirubin, Total 0 7 mg/dL  0 0-1 2   Alkaline Phosphatase, S 63 IU/L     AST (SGOT) 22 IU/L  0-40   ALT (SGPT) 23 IU/L  0-44   eGFR If NonAfricn Am 46 mL/min/1 73 L >59   eGFR If Africn Am 53 mL/min/1 73 L >59 (1) LIPID PANEL, FASTING [id number] 11:43AM Maria Del Rosario Cade      Test Name Result Flag Reference   Cholesterol, Total 137 mg/dL  100-199   Triglycerides 102 mg/dL  0-149   HDL Cholesterol 41 mg/dL  >39   VLDL Cholesterol Allen 20 mg/dL  5-40   LDL Cholesterol Calc 76 mg/dL  0-99   T  Chol/HDL Ratio 3 3 ratio units  0 0-5 0   T  Chol/HDL Ratio                                                                Men  Women                                                  1/2 Avg  Risk  3 4    3 3                                                      Avg Risk  5 0    4 4                                                   2X Avg  Risk  9 6    7 1                                                   3X Avg  Risk 23 4   11 0      (LC) [facility] CKD Program [id number] 11:43AM Maria Del Rosario Cade      Test Name Result Flag Reference   Interpretation Note     -------------------------------     CHRONIC KIDNEY DISEASE:     EGFR, BLOOD PRESSURE, AND PROTEINURIA ASSESSMENT     Estimated GFR has decreased, was 53 and now is 46     mL/min/1 73mE2  Current eGFR corresponds to CKD stage 3a  Multiply eGFR by 1 159 if patient is   Potassium is above goal and has risen, was 4 8 and now is     5 3 mmol/L  EGFR, BLOOD PRESSURE, AND PROTEINURIA TREATMENT SUGGESTIONS     -     Guidelines recommend a target blood pressure of 140/90 mmHg     or less in CKD patients to reduce cardiovascular risk and     CKD progression  A higher blood pressure target may be     appropriate in older individuals to avoid symptomatic     hypotension  ACEI or ARB may increase serum potassium  Maintain low potassium diet and avoid salt substitutes and     potassium retaining medications (e g  NSAIDS, potassium     sparing diuretics, trimethoprim/sulfa)   Assessment of     albuminuria (urine albumin:creatinine ratio or urine     protein:creatinine ratio preferred) is recommended at least     annually in CKD patients for staging and disease prognosis  EGFR, BLOOD PRESSURE, AND PROTEINURIA FOLLOW-UP     -     Spot Urine Panel is recommended by KDOQI guidelines, at     least yearly; fasting Renal Panel within 1 month;     -     BONE and MINERAL ASSESSMENT     Calcium is within goal and has not changed significantly,     was 9 2 and now is 9 5 mg/dL  Carbon Dioxide is within goal     and has not changed significantly, was 24 and now is 25     mmol/L  Guidelines recommend the measurement of 25-hydroxy     vitamin D in patients with CKD  BONE and MINERAL TREATMENT SUGGESTIONS     -     Interpretations require simultaneous measurements of serum     calcium and phosphorus  BONE and MINERAL FOLLOW-UP     -     fasting PTH with Renal Panel and 25-Hydroxy Vitamin D are     recommended by KDOQI guidelines, at least yearly;     -     LIPIDS ASSESSMENT     LDL-C is normal and has not changed significantly, was 83     and now is 76 mg/dL  Triglyceride is normal and has risen,     was 57 and now is 102 mg/dL  Non-HDL Cholesterol is optimal     and has not changed significantly, was 94 and now is 96     mg/dL  HDL-C is normal and has decreased, was 51 and now is     41 mg/dL  LIPIDS TREATMENT SUGGESTIONS     -     Therapeutic lifestyle changes are always valuable to     maintain optimal blood lipid status (diet, exercise, weight     management)  If at least a 50% LDL reduction from baseline     has not been achieved, begin or increase statin  Consider     measurement of LDL particle number or Apo B to adjudicate     need for further LDL lowering therapy  If statin cannot be     tolerated or increased, alternatives include use of an     intestinal agent (ezetimibe or bile acid sequestrant) or     niacin  LIPIDS FOLLOW-UP     -     fasting Lipid Panel within 12 months;     -     ANEMIA ASSESSMENT     Most recent order does not include a CBC Panel or iron     studies       ANEMIA FOLLOW-UP     -     CBC is recommended by KDOQI guidelines, at least yearly;     -------------------------------     DISCLAIMER     These assessments and treatment suggestions are provided as     a convenience in support of the physician-patient     relationship and are not intended to replace the physician's     clinical judgment  They are derived from national guidelines     in addition to other evidence and expert opinion  The     clinician should consider this information within the     context of clinical opinion and the individual patient  SEE GUIDANCE FOR CHRONIC KIDNEY DISEASE PROGRAM: Kidney     Disease Improving Global Outcomes (KDIGO) clinical practice     guidelines are at http://kdigo  org/home/guidelines/      [facility] Kidney Disease Outcomes Quality     Initiative (KDOQI (TM)), with its limitations and     disclaimers, are at www kidney  org/professionals/KDOQI  This     program is intended for patients who have been diagnosed     with stages 3, 4, or pre-dialysis 5 CKD  It is not intended     for children, pregnant patients, or transplant patients  PDF Image   [facility]) Cardiovascular Risk Assessment [id number] 11:43AM Delmonico, Cathye Fleischer      Test Name Result Flag Reference   Interpretation Note     Supplemental report is available     PDF Image Not applicable        Nephrology Flowsheet [id number] 08:46AM Robel Díaz      Test Name Result Flag Reference   WBC 9 0     Hemoglobin 16 2     Hematocrit 48 7     Platelets 644     Sodium 142     Potassium 5 2     Chloride 101     Carbon Dioxide 25     Calcium 9 7     GLUCOSE 127     BUN 19     Serum Creatinine 1 42     GFR, NON- 47     Magnesium 2 3     Phosphorus 3 5     Urine Protein Creatinine Ratio 0 1          Future Appointments      Date/Time Provider Specialty Site   01/31/2018 11:30 AM Justin Blue [facility]     Signatures    Electronically signed by : NICK Lr ; Dec 21 2017  1:01PM EST                       (Author)

## 2018-01-09 NOTE — MISCELLANEOUS
Message   Recorded as Task   Date: 10/25/2016 03:10 PM, Created By: Jv Braden   Task Name: Miscellaneous   Assigned To: Jv Braden   Regarding Patient: Buddy Matos, Status: In Progress   Comment:    Jazmyne Trotter - 25 Oct 2016 3:10 PM     TASK CREATED  Urology would like to begin oxybutynin 5 mg and d/c Vesicare as the pt can no longer afford it  Pt would like to know if the oxybutynin is ok from a Nephrology stand point  [facility] 10/25/2016   Neri Page - 25 Oct 2016 3:37 PM     TASK EDITED  ok to use   can sometimes raise BP and cause urinary retention  If his home BPs increase or difficult urinating he should call us and urology   Jazmyne Trotter - 25 Oct 2016 3:58 PM     TASK IN PROGRESS   I did inform the pt that it is ok to start the Oxybutynin however it can cause an increase in BP along with urinary retention  Pt was told that should his home BP's increase or have any difficulty urinating he should contact both us and urology  [facility] 10/25/2016      Active Problems    1  Abrasion (919 0) (T14 8)   2  Acquired ankle/foot deformity (736 70) (M21 969)   3  Acute gout (274 01) (M10 9)   4  Acute gouty arthritis (274 01) (M10 9)   5  Allergic rhinitis (477 9) (J30 9)   6  Aneurysm of abdominal aorta (441 4) (I71 4)   7  Aneurysm of abdominal aorta (441 4) (I71 4)   8  Angina pectoris (413 9) (I20 9)   9  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)   10  Arteriosclerosis of coronary artery (414 00) (I25 10)   11  Arthralgia of ankle or foot (719 47) (M25 579)   12  Atherosclerosis of arteries of extremities (440 20) (I70 209)   13  Atrial flutter (427 32) (I48 92)   14  Benign essential hypertension (401 1) (I10)   15  Benign hypertensive CKD (403 10) (I12 9)   16  Benign Localized Prostatic Hyperplasia Without Urinary Obstruction With Other Lower    Urinary Tract Symptoms (600 20)   17  Benign prostatic hypertrophy (600 00) (N40 0)   18   Bilateral edema of lower extremity (782 3) (R60 0)   19  Bilateral lumbar radiculopathy (724 4) (M54 16)   20  Blurred vision (368 8) (H53 8)   21  Body mass index (BMI) of 45 0 to 49 9 in adult (V85 42) (Z68 42)   22  Callus (700) (L84)   23  Carpal tunnel syndrome, unspecified laterality (354 0) (G56 00)   24  Cellulitis (682 9) (L03 90)   25  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)   26  Chronic kidney disease, stage 3 (585 3) (N18 3)   27  Chronic low back pain (724 2,338 29) (M54 5,G89 29)   28  Chronic obstructive pulmonary disease (496) (J44 9)   29  Chronic obstructive pulmonary disease (496) (J44 9)   30  Chronic pain syndrome (338 4) (G89 4)   31  Colon cancer screening (V76 51) (Z12 11)   32  Colon, diverticulosis (562 10) (K57 30)   33  Congestive heart failure (428 0) (I50 9)   34  Coronary artery disease (414 00) (I25 10)   35  Deep venous insufficiency (459 81) (I87 2)   36  Degenerative disc disease, lumbar (722 52) (M51 36)   37  Difficulty in walking (719 7) (R26 2)   38  Fecal soiling (787 62) (R15 1)   39  Fibromyalgia (729 1) (M79 7)   40  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 3)   41  Gastrointestinal symptoms (787 99) (R19 8)   42  Gout (274 9) (M10 9)   43  Hyperkalemia (276 7) (E87 5)   44  Hyperlipidemia (272 4) (E78 5)   45  Insomnia, persistent (307 42) (G47 00)   46  Lumbar canal stenosis (724 02) (M48 06)   47  Lumbar neuritis (724 4) (M54 16)   48  Memory Lapses Or Loss (780 93)   49  Microscopic hematuria (599 72) (R31 29)   50  Moderate or severe vision impairment, one eye (369 60) (H54 40)   51  Morbid or severe obesity due to excess calories (278 01) (E66 01)   52  Morbid or severe obesity due to excess calories (278 01) (E66 01)   53  Needs flu shot (V04 81) (Z23)   54  Nicotine dependence (305 1) (F17 200)   55  Olecranon bursitis (726 33) (M70 20)   56  Onychogryphosis (703 8)   57  Onychomycosis (110 1) (B35 1)   58  Osteoarthritis, knee/lower leg (715 96) (M17 9)   59   Pain in joints of both feet (719 47) (M79 671,M79 672)   60  Paronychia of toe, unspecified laterality (681 11) (L03 039)   61  Permanent Pacemaker Type Dual-Chamber   62  Pes planus, congenital (754 61) (Q66 50)   63  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)   64  Presence of cardiac pacemaker (V45 01) (Z95 0)   65  Proteinuria (791 0) (R80 9)   66  Radiculopathy (729 2) (M54 10)   67  Screening for genitourinary condition (V81 6) (Z13 89)   68  Skin tear of right lower leg without complication, initial encounter (891 0) (S81 801A)   69  Sleep apnea (780 57) (G47 30)   70  Sleep apnea (780 57) (G47 30)   71  Tenderness in limb (729 5) (M79 609)   72  Tendonitis (726 90) (M77 9)   73  Urinary incontinence (788 30) (R32)   74  Urinary tract infection (599 0) (N39 0)   75  Venous insufficiency (chronic) (peripheral) (459 81) (I87 2)    Current Meds   1  Allopurinol 100 MG Oral Tablet; TAKE 1 TABLET DAILY FOR  GOUT;   Therapy: 15VUK6042 to (Evaluate:31Daj9814)  Requested for: 41Elk2095; Last   Rx:56Bxv8510 Ordered   2  B Complex Oral Capsule; TAKE 1 CAPSULE DAILY; Therapy: (Recorded:11Ibl8677) to Recorded   3  Biotin 1000 MCG Oral Tablet; Take 1 tablet daily; Therapy: (Recorded:82Xyj9027) to Recorded   4  Calcium-Carb 600 600 MG Oral Tablet; 1 tab daily; Therapy: 32TNA4403 to  Requested for: 63MQI6636 Recorded   5  Centrum Silver Ultra Mens Oral Tablet; 1 tab daily; Therapy: 14QBA3205 to  Requested for: 23UQT7242 Recorded   6  Cinnamon 500 MG Oral Capsule; Take 1 capsule twice daily; Therapy: (Recorded:70Kxk9291) to Recorded   7  ClonazePAM 1 MG Oral Tablet Dispersible; PLACE 1 TABLET ON TONGUE AND ALLOW   TO DISSOLVE TWICE DAILY AS NEEDED; Therapy: 89TUQ9640 to (Evaluate:70Kld8857); Last Rx:80Pou1453 Ordered   8  Clopidogrel Bisulfate 75 MG Oral Tablet (Plavix); TAKE 1 TABLET BY MOUTH ONCE A   DAY; Therapy: 56QES2880 to (Evaluate:35Bvx9446)  Requested for: 89LUN5902; Last   Rx:01Yjx5462 Ordered   9   Colcrys 0 6 MG Oral Tablet; TAKE 1 TABLET DAILY AS DIRECTED; Therapy: 48KRB6625 to (Evaluate:21Jun2016); Last Rx:24Mar2016 Ordered   10  Cranberry Fruit Concentrate 42050 MG Oral Capsule; Take 1 capsule twice daily    Recorded   11  [employer] Fish Oil CAPS; TAKE 1 CAPSULE Daily Recorded   12  Dulera 200-5 MCG/ACT Inhalation Aerosol; INHALE 2 PUFFS TWICE DAILY; Therapy: 34JBN7984 to (Last Rx:19Nov2013) Ordered   13  Felodipine ER 5 MG Oral Tablet Extended Release 24 Hour; take 1 tablet by mouth once    daily; Therapy: 26IJK6765 to (Evaluate:27Sep2016)  Requested for: 25VTW4264; Last    Rx:29Jun2016 Ordered   14  Finasteride 5 MG Oral Tablet (Proscar); 1 DAILY; Therapy: 62Ezv9651 to (Last Rx:20Hsc9839)  Requested for: 55GFB8174 Ordered   15  Flax Seed Oil 1000 MG Oral Capsule; 2 tabs daily; Therapy: 68ILE5605 to  Requested for: 46ZHW7587 Recorded   16  Furosemide 40 MG Oral Tablet; TAKE 1 TABLET DAILY AS DIRECTED; Therapy: (Recorded:11Hnf1674) to Recorded   17  Ginkgo Biloba 120 MG Oral Capsule; Take 1 capsule twice daily; Therapy: (Recorded:08Msf0637) to Recorded   18  Glucosamine 1500 Complex Oral Capsule; TAKE 2 CAPSULE Twice daily; Therapy: (Recorded:34Gwc3316) to Recorded   19  Lactaid 4500 UNIT Oral Tablet Chewable; CHEW AND SWALLOW 1 TO 3 TABLETS    WITH FIRST BITE OF MILK FOOD; Therapy: (Recorded:71Kne7189) to Recorded   20  L-Lysine 500 MG Oral Tablet; 1 tab daily; Therapy: 93CLW4196 to  Requested for: 31ZYJ2122 Recorded   21  Lyrica 150 MG Oral Capsule; TAKE 1 CAPSULE 3 TIMES DAILY; Therapy: 46LWE7579 to (Evaluate:21Jun2016); Last Rx:23Mar2016 Ordered   22  Lyrica 75 MG Oral Capsule; Take 1 capsule twice daily; Therapy: 13Wom3889 to (Evaluate:12Oct2016); Last Rx:12Sep2016 Ordered   23  Magnesium 500 MG Oral Capsule; take 1 capsule daily; Therapy: (Recorded:62Lgk7389) to Recorded   24   Nitrostat 0 4 MG Sublingual Tablet Sublingual (Nitroglycerin); DISSOLVE 1 TABLET    UNDER THE TONGUE AS NEEDED FOR CHEST PAIN; Therapy: 53VKA5746 to [phone])  Requested for: 11Yue8606; Last    Rx:84Squ5092 Ordered   25  PARoxetine HCl - 20 MG Oral Tablet; 1 EVERY MORNING; Therapy: 12MUO1263 to (Last Rx:40Qrd4494)  Requested for: 16DNX4456 Ordered   26  ProAir  (90 Base) MCG/ACT Inhalation Aerosol Solution; 2 puffs Q4 hours prn    SOB/wheeze; Therapy: 84URK2130 to (Last Rx:19Nov2013)  Requested for: 98BEY0217 Ordered   27  Selenium  MCG Oral Tablet Extended Release; 1 tab daily; Therapy: 97FKI4834 to  Requested for: 81LHA2975 Recorded   28  Silver Sulfadiazine 1 % External Cream; APPLY TO AFFECTED AREA TWICE DAILY AS    DIRECTED; Therapy: 76OON4992 to (Last Rx:06Apr2016)  Requested for: 64Jeb5775 Ordered   29  Simvastatin 20 MG Oral Tablet (Zocor); 1 TAB DAILY; Therapy: 62YWB6701 to (Last Rx:28Apr2016)  Requested for: 28Apr2016 Ordered   30  Super B Complex-100 TABS; TAKE 1 TABLET DAILY AS DIRECTED; Therapy: (Recorded:08Apr2014) to Recorded   31  TraMADol HCl - 50 MG Oral Tablet; TAKE 1 TABLET 4 TIMES DAILY AS NEEDED FOR    PAIN; Last Rx:35Wju1925 Ordered   32  [place] Pressair 400 MCG/ACT Inhalation Aerosol Powder Breath Activated; INHALE 1    PUFFS Daily; Therapy: 46UDD7890 to (Last Rx:32Iil9405) Ordered   33  VESIcare 5 MG Oral Tablet; Take 1 tablet daily Recorded   34  Vitamin C 500 MG Oral Tablet; TAKE 1 TABLET DAILY; Therapy: (Recorded:71Nlr4005) to Recorded   35  Vitamin D 1000 UNIT Oral Tablet; TAKE 1 TABLET DAILY; Therapy: (Recorded:49Zkw4988) to Recorded   36  Vitamin E 400 UNIT Oral Capsule; 1 tab daily; Therapy: 06UAY4916 to  Requested for: 07RXQ8403 Recorded   37  Xarelto 20 MG Oral Tablet; One a day; Therapy: 65HYL2733 to (Last Rx:00Toi5822) Ordered   38  Zinc 50 MG Oral Tablet; TAKE 1 TABLET DAILY; Therapy: (Recorded:08Apr2014) to Recorded    Allergies    1   No Known Drug Allergies    Signatures   Electronically signed by : NICK Cueto Ala ; Oct 25 2016  4:31PM EST

## 2018-01-10 NOTE — MISCELLANEOUS
Message   Recorded as Task   Date: 07/08/2016 04:07 PM, Created By: Naif Dominguez   Task Name: Miscellaneous   Assigned To: Naif Dominguez   Regarding Patient: Reji Degree, Status: In Progress   RockJacky Aragon - 08 Jul 2016 4:07 PM     TASK CREATED  Gabby Saldivar has an upcoming appt with you on the 19th but has not had any recent blood work since April 2016, let me know if you want me to order any prior to the appt, thanks  Neri Page - 12 Jul 2016 1:12 PM     TASK EDITED  bmp/mg/p04/cbc/pth/uric acid/ spot urine for prot and creat   Jazmyne Trotter - 12 Jul 2016 1:30 PM     TASK IN PROGRESS   Labs were ordered and mailed to pt home  [facility] 7/12/2016      Active Problems    1  Abrasion (919 0) (T14 8)   2  Acquired ankle/foot deformity (736 70) (M21 969)   3  Acute gout (274 01) (M10 9)   4  Acute gouty arthritis (274 01) (M10 00)   5  Allergic rhinitis (477 9) (J30 9)   6  Aneurysm of abdominal aorta (441 4) (I71 4)   7  Aneurysm of abdominal aorta (441 4) (I71 4)   8  Angina pectoris (413 9) (I20 9)   9  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)   10  Arteriosclerosis of coronary artery (414 00) (I25 10)   11  Arthralgia of ankle or foot (719 47) (M25 579)   12  Atherosclerosis of arteries of extremities (440 20) (I70 209)   13  Atrial flutter (427 32) (I48 92)   14  Benign essential hypertension (401 1) (I10)   15  Benign hypertensive CKD (403 10) (I12 9)   16  Benign Localized Prostatic Hyperplasia Without Urinary Obstruction With Other Lower    Urinary Tract Symptoms (600 20)   17  Benign prostatic hypertrophy (600 00) (N40 0)   18  Bilateral edema of lower extremity (782 3) (R60 0)   19  Bilateral lumbar radiculopathy (724 4) (M54 16)   20  Blurred vision (368 8) (H53 8)   21  Body mass index (BMI) of 45 0 to 49 9 in adult (V85 42) (Z68 42)   22  Callus (700) (L84)   23  Carpal tunnel syndrome, unspecified laterality (354 0) (G56 00)   24  Cellulitis (682 9) (L03 90)   25  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)   26  Chronic kidney disease, stage 3 (585 3) (N18 3)   27  Chronic obstructive pulmonary disease (496) (J44 9)   28  Chronic obstructive pulmonary disease (496) (J44 9)   29  Chronic pain syndrome (338 4) (G89 4)   30  Colon cancer screening (V76 51) (Z12 11)   31  Colon, diverticulosis (562 10) (K57 30)   32  Congestive heart failure (428 0) (I50 9)   33  Coronary artery disease (414 00) (I25 10)   34  Deep venous insufficiency (459 81) (I87 2)   35  Degenerative disc disease, lumbar (722 52) (M51 36)   36  Difficulty in walking (719 7) (R26 2)   37  Fecal soiling (787 62) (R15 1)   38  Fibromyalgia (729 1) (M79 7)   39  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 3)   40  Gastrointestinal symptoms (787 99) (R19 8)   41  Gout (274 9) (M10 9)   42  Hyperkalemia (276 7) (E87 5)   43  Hyperlipidemia (272 4) (E78 5)   44  Lumbar canal stenosis (724 02) (M48 06)   45  Lumbar neuritis (724 4) (M54 16)   46  Memory Lapses Or Loss (780 93)   47  Microscopic hematuria (599 72) (R31 2)   48  Moderate or severe vision impairment, one eye (369 60) (H54 40)   49  Morbid or severe obesity due to excess calories (278 01) (E66 01)   50  Morbid or severe obesity due to excess calories (278 01) (E66 01)   51  Needs flu shot (V04 81) (Z23)   52  Nicotine dependence (305 1) (F17 200)   53  Olecranon bursitis (726 33) (M70 20)   54  Onychogryphosis (703 8)   55  Onychomycosis (110 1) (B35 1)   56  Osteoarthritis, knee/lower leg (715 96) (M17 9)   57  Pain in joints of both feet (719 47) (M79 671,M79 672)   58  Paronychia of toe, unspecified laterality (681 11) (L03 039)   59  Permanent Pacemaker Type Dual-Chamber   60  Pes planus, congenital (754 61) (Q66 50)   61  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)   62  Presence of cardiac pacemaker (V45 01) (Z95 0)   63  Proteinuria (791 0) (R80 9)   64  Radiculopathy (729 2) (M54 10)   65  Screening for genitourinary condition (V81 6) (Z13 89)   66   Skin tear of right lower leg without complication, initial encounter (891 0) (S81 801A)   67  Sleep apnea (780 57) (G47 30)   68  Sleep apnea (780 57) (G47 30)   69  Tenderness in limb (729 5) (M79 609)   70  Tendonitis (726 90) (M77 9)   71  Urinary incontinence (788 30) (R32)   72  Urinary tract infection (599 0) (N39 0)   73  Venous insufficiency (chronic) (peripheral) (459 81) (I87 2)    Current Meds   1  Allopurinol 100 MG Oral Tablet; TAKE 1 TABLET DAILY FOR GOUT;   Therapy: 26HIW9595 to (Last Rx:09Ygr1029)  Requested for: 39Wuw3702 Ordered   2  B Complex Oral Capsule; TAKE 1 CAPSULE DAILY; Therapy: (Recorded:60Gsl7491) to Recorded   3  Biotin 1000 MCG Oral Tablet; Take 1 tablet daily; Therapy: (Recorded:93Ljn0452) to Recorded   4  Calcium-Carb 600 600 MG Oral Tablet; 1 tab daily; Therapy: 39CFZ4664 to  Requested for: 60SCF3335 Recorded   5  Centrum Silver Ultra Mens Oral Tablet; 1 tab daily; Therapy: 36ZST1409 to  Requested for: 38IIG6249 Recorded   6  Cinnamon 500 MG Oral Capsule; Take 1 capsule twice daily; Therapy: (Recorded:39Msi2353) to Recorded   7  ClonazePAM 1 MG Oral Tablet Dispersible; PLACE 1 TABLET ON TONGUE AND ALLOW   TO DISSOLVE TWICE DAILY AS NEEDED; Therapy: 29NRG8221 to (Evaluate:56Rka9491); Last Rx:07Jun2016 Ordered   8  Clopidogrel Bisulfate 75 MG Oral Tablet (Plavix); take 1 tablet by mouth once daily; Therapy: 10OYL1922 to (Evaluate:16Oct2016)  Requested for: 26QNQ3323; Last   Rx:22Oct2015 Ordered   9  Colcrys 0 6 MG Oral Tablet; TAKE 1 TABLET DAILY AS DIRECTED; Therapy: 06SNI8548 to (Evaluate:21Jun2016); Last Rx:24Mar2016 Ordered   10  Cranberry Fruit Concentrate 73037 MG Oral Capsule; Take 1 capsule twice daily    Recorded   11  [employer] Fish Oil CAPS; TAKE 1 CAPSULE Daily Recorded   12  Dulera 200-5 MCG/ACT Inhalation Aerosol; INHALE 2 PUFFS TWICE DAILY; Therapy: 90BXK6708 to (Last Rx:19Nov2013) Ordered   13   Felodipine ER 5 MG Oral Tablet Extended Release 24 Hour; take 1 tablet by mouth once    daily; Therapy: 69ZFR7321 to (Evaluate:89Pmy5725)  Requested for: 97MIG9562; Last    Rx:29Jun2016 Ordered   14  Finasteride 5 MG Oral Tablet (Proscar); 1 DAILY; Therapy: 24Wyb5660 to (Last Rx:67Vxa3867)  Requested for: 18SXZ3451 Ordered   15  Flax Seed Oil 1000 MG Oral Capsule; 2 tabs daily; Therapy: 63XVU4762 to  Requested for: 57KCE3886 Recorded   16  Furosemide 40 MG Oral Tablet; TAKE 1 TABLET DAILY AS DIRECTED; Therapy: (Recorded:21Uup0263) to Recorded   17  Ginkgo Biloba 120 MG Oral Capsule; Take 1 capsule twice daily; Therapy: (Recorded:63Cpe5993) to Recorded   18  Glucosamine 1500 Complex Oral Capsule; TAKE 2 CAPSULE Twice daily; Therapy: (Recorded:09Ztv0972) to Recorded   19  Lactaid 4500 UNIT Oral Tablet Chewable; CHEW AND SWALLOW 1 TO 3 TABLETS    WITH FIRST BITE OF MILK FOOD; Therapy: (Recorded:89Xqc3227) to Recorded   20  L-Lysine 500 MG Oral Tablet; 1 tab daily; Therapy: 95DKS6929 to  Requested for: 73ONI1634 Recorded   21  Lyrica 150 MG Oral Capsule; TAKE 1 CAPSULE 3 TIMES DAILY; Therapy: 33HUN8557 to (Evaluate:21Jun2016); Last Rx:23Mar2016 Ordered   22  Magnesium 500 MG Oral Capsule; take 1 capsule daily; Therapy: (Recorded:19Euw1530) to Recorded   23  Nitrostat 0 4 MG Sublingual Tablet Sublingual; DISSOLVE 1 TABLET UNDER THE    TONGUE AS NEEDED FOR CHEST PAIN;    Therapy: 58QWZ3447 to (Evaluate:29Oct2015)  Requested for: 11Bjy1719; Last    Rx:19Hvx1377 Ordered   24  PARoxetine HCl - 10 MG Oral Tablet; TAKE 0 5 TABLET Daily replace prior dose; Therapy: 42QDZ0188 to (Evaluate:14Oct2016)  Requested for: 48MJO6486; Last    Rx:16Jun2016 Ordered   25  ProAir  (90 Base) MCG/ACT Inhalation Aerosol Solution; 2 puffs Q4 hours prn    SOB/wheeze; Therapy: 94WSV1805 to (Last Rx:19Nov2013)  Requested for: 17RLD8478 Ordered   26  Selenium  MCG Oral Tablet Extended Release; 1 tab daily; Therapy: 52ZED5404 to  Requested for: 27MOP8849 Recorded   27  Silver Sulfadiazine 1 % External Cream; APPLY TO AFFECTED AREA TWICE DAILY AS    DIRECTED; Therapy: 00KPP6160 to (Last Rx:06Apr2016)  Requested for: 06Apr2016 Ordered   28  Simvastatin 20 MG Oral Tablet (Zocor); 1 TAB DAILY; Therapy: 21RXT9397 to (Last Rx:28Apr2016)  Requested for: 28Apr2016 Ordered   29  Super B Complex-100 TABS; TAKE 1 TABLET DAILY AS DIRECTED; Therapy: (Recorded:08Apr2014) to Recorded   30  TraMADol HCl - 50 MG Oral Tablet; TAKE 1 TABLET 4 TIMES DAILY AS NEEDED FOR    PAIN; Last Rx:03Cmu5918 Ordered   31  [place] Pressair 400 MCG/ACT Inhalation Aerosol Powder Breath Activated; INHALE 1    PUFFS Daily; Therapy: 75TND6597 to (Last Rx:59Idr9670) Ordered   32  VESIcare 5 MG Oral Tablet; Take 1 tablet daily Recorded   33  Vitamin C 500 MG Oral Tablet; TAKE 1 TABLET DAILY; Therapy: (Recorded:21Apr2014) to Recorded   34  Vitamin D 1000 UNIT Oral Tablet; TAKE 1 TABLET DAILY; Therapy: (Recorded:08Apr2014) to Recorded   35  Vitamin E 400 UNIT Oral Capsule; 1 tab daily; Therapy: 02WIU0395 to  Requested for: 02KVX3942 Recorded   36  Xarelto 20 MG Oral Tablet; One a day; Therapy: 45VOV5567 to (Last Rx:36Lqg5911) Ordered   37  Zinc 50 MG Oral Tablet; TAKE 1 TABLET DAILY; Therapy: (Recorded:08Apr2014) to Recorded    Allergies    1  No Known Drug Allergies    Plan  Chronic kidney disease, stage 3    · (1) BASIC METABOLIC PROFILE; Status:Active - Retrospective By Protocol  Authorization; Requested for:26Tma6204;    · (1) CBC/ PLT (NO DIFF); Status:Active - Retrospective By Protocol Authorization; Requested for:58Jes1695;    · (1) MAGNESIUM; Status:Active - Retrospective By Protocol Authorization; Requested  for:24Xvr1820;    · (1) PHOSPHORUS; Status:Active - Retrospective By Protocol Authorization; Requested  for:20Quh7315;    · (1) PTH, INTACT AND CALCIUM; Status:Active - Retrospective By Protocol Authorization;   Requested for:23Cnv6972;    · (1) URIC ACID; Status:Active - Retrospective By Protocol Authorization; Requested  for:60Iab1686;    · (1) URINE PROTEIN CREATININE RATIO; Status:Active - Retrospective By Protocol  Authorization;  Requested for:02Yaw7055;     Signatures   Electronically signed by : NICK Xiong ; Jul 12 2016  4:02PM EST

## 2018-01-10 NOTE — PROGRESS NOTES
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)   3  History of radiculopathy (V12 49) (Z86 69)   4  Radiculopathy (729 2) (M54 10)    Plan    · Follow-up visit in 2 weeks Evaluation and Treatment  Follow-up  Status: Hold For -  Scheduling  Requested for: 49NQK6134    Discussion/Summary  Possible side effects of new medications were reviewed with the patient/guardian today  The treatment plan was reviewed with the patient/guardian  The patient/guardian understands and agrees with the treatment plan   The patient was counseled regarding diagnostic results, instructions for management, prognosis, patient and family education, risks and benefits of treatment options, importance of compliance with treatment  Chief Complaint  Patient has pain in both feet  History of Present Illness  HPI: Agent has continued bilateral lower extremity pain  He is taking Lyrica  He takes Percocet when necessary  He has back pain  His legs feel heavy and weak  Active Problems    1  Acquired ankle/foot deformity (736 70) (M21 969)   2  Acute gout (274 01) (M10 9)   3  Acute gouty arthritis (274 01) (M10 00)   4  Allergic rhinitis (477 9) (J30 9)   5  Aneurysm of abdominal aorta (441 4) (I71 4)   6  Aneurysm of abdominal aorta (441 4) (I71 4)   7  Angina pectoris (413 9) (I20 9)   8  Arteriosclerosis of coronary artery (414 00) (I25 10)   9  Arthralgia of ankle or foot (719 47) (M25 579)   10  Atherosclerosis of arteries of extremities (440 20) (I70 209)   11  Atrial flutter (427 32) (I48 92)   12  Benign essential hypertension (401 1) (I10)   13  Benign hypertensive CKD (403 10) (I12 9)   14  Benign Localized Prostatic Hyperplasia Without Urinary Obstruction With Other Lower    Urinary Tract Symptoms (600 20)   15  Benign prostatic hypertrophy (600 00) (N40 0)   16  Blurred vision (368 8) (H53 8)   17  Callus (700) (L84)   18   Carpal tunnel syndrome, unspecified laterality (354 0) (G56 00)   19  Cellulitis (682 9) (L03 90)   20  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)   21  Chronic kidney disease, stage 3 (585 3) (N18 3)   22  Chronic obstructive pulmonary disease (496) (J44 9)   23  Chronic obstructive pulmonary disease (496) (J44 9)   24  Colon cancer screening (V76 51) (Z12 11)   25  Colon, diverticulosis (562 10) (K57 30)   26  Congestive heart failure (428 0) (I50 9)   27  Coronary artery disease (414 00) (I25 10)   28  Deep venous insufficiency (459 81) (I87 2)   29  Difficulty in walking (719 7) (R26 2)   30  Fecal soiling (787 62) (R15 1)   31  Fibromyalgia (729 1) (M79 7)   32  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 3)   33  Gastrointestinal symptoms (787 99) (R19 8)   34  Gout (274 9) (M10 9)   35  Hyperkalemia (276 7) (E87 5)   36  Hyperlipidemia (272 4) (E78 5)   37  Limb pain (729 5) (M79 609)   38  Memory Lapses Or Loss (780 93)   39  Microscopic hematuria (599 72) (R31 2)   40  Moderate or severe vision impairment, one eye (369 60) (H54 40)   41  Morbid or severe obesity due to excess calories (278 01) (E66 01)   42  Morbid or severe obesity due to excess calories (278 01) (E66 01)   43  Needs flu shot (V04 81) (Z23)   44  Nicotine dependence (305 1) (F17 200)   45  Olecranon bursitis (726 33) (M70 20)   46  Onychogryphosis (703 8)   47  Onychomycosis (110 1) (B35 1)   48  Osteoarthritis, knee/lower leg (715 96) (M17 9)   49  Pain in joints of both feet (719 47) (M79 671,M79 672)   50  Paronychia of toe, unspecified laterality (681 11) (L03 039)   51  Permanent Pacemaker Type Dual-Chamber   52  Pes planus, congenital (754 61) (Q66 50)   53  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)   54  Presence of cardiac pacemaker (V45 01) (Z95 0)   55  Proteinuria (791 0) (R80 9)   56  Screening for genitourinary condition (V81 6) (Z13 89)   57  Sleep apnea (780 57) (G47 30)   58  Sleep apnea (780 57) (G47 30)   59  Tendonitis (726 90) (M77 9)   60  Urinary incontinence (788 30) (R32)   61  Urinary tract infection (599 0) (N39 0)   62  Venous insufficiency (chronic) (peripheral) (459 81) (I87 2)    Past Medical History    · History of Atrial flutter (427 32) (I48 92)   · History of Cellulitis of foot (682 7) (L03 119)   · History of Colon cancer screening (V76 51) (Z12 11)   · History of Difficulty in walking (719 7) (R26 2)   · History of Difficulty in walking (719 7) (R26 2)   · General medical examination (V70 9) (Z00 00)   · History of backache (V13 59) (Z87 39)   · History of radiculopathy (V12 49) (Z86 69)   · 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)    The active problems and past medical history were reviewed and updated today  Surgical History    · History of Cath Stent Placement   · History of Cholecystectomy   · History of Knee Surgery   · History of Permanent Pacemaker Implantation Date   · Permanent Pacemaker Type Dual-Chamber    The surgical history was reviewed and updated today  Family History    · Family history of Cancer   · Family history of Family Health Status Of Mother -    · Family history of Heart Disease (V17 49)   · Family history of Hypertension (V17 49)   · Family history of Mother  At Age 80    · Family history of Father  At Age 47   · Family history of Heart Disease (V17 49)    · Family history of Heart Disease (V17 49)    Social History    · Denied: Alcohol Use (History)   · Being A Social Drinker   · Cigarette smoker (305 1) (F17 210)   · Current Every Day Smoker (305 1)   · Current Smoker (V15 82)   · Daily Tea Consumption (10  Cups/Day)   · Denied: Drug Use (305 90)   · Marital History - Currently    · Recently Stopping Smoking (V15 82)  The social history was reviewed and updated today  The social history was reviewed and is unchanged  Current Meds   1   Allopurinol 100 MG Oral Tablet; TAKE 1 TABLET DAILY FOR GOUT;   Therapy: 13OJX1878 to (Last Rx:73Yya9548)  Requested for: 74Pxp3744 Ordered   2  B Complex Oral Capsule; TAKE 1 CAPSULE DAILY; Therapy: (Recorded:21Apr2014) to Recorded   3  Biotin 1000 MCG Oral Tablet; Take 1 tablet daily; Therapy: (Recorded:08Apr2014) to Recorded   4  Calcium-Carb 600 600 MG Oral Tablet; 1 tab daily; Therapy: 26JIA8968 to  Requested for: 70TBE8816 Recorded   5  Centrum Silver Ultra Mens Oral Tablet; 1 tab daily; Therapy: 40PZA2581 to  Requested for: 58VXU4619 Recorded   6  Cinnamon 500 MG Oral Capsule; Take 1 capsule twice daily; Therapy: (Recorded:08Apr2014) to Recorded   7  Clopidogrel Bisulfate 75 MG Oral Tablet; take 1 tablet by mouth once daily; Therapy: 00NUF8800 to (Evaluate:16Oct2016)  Requested for: 68DUS9139; Last   Rx:22Oct2015 Ordered   8  Colcrys 0 6 MG Oral Tablet; TAKE 1 TABLET DAILY AS DIRECTED; Therapy: 50VUB8729 to (Evaluate:97Xot4660); Last Rx:73Jsr4371 Ordered   9  Colcrys 0 6 MG Oral Tablet; TAKE 1 TABLET DAILY AS DIRECTED; Therapy: 69OEG1347 to (Evaluate:78Ctl6856)  Requested for: 67TUX0926; Last   Rx:91Vgl6447 Ordered   10  Colcrys 0 6 MG Oral Tablet; USE AS DIRECTED; Therapy: 85Djk9383 to (Evaluate:23Jan2016)  Requested for: 20Jan2016; Last    Rx:20Jan2016 Ordered   11  Cranberry Fruit Concentrate 79666 MG Oral Capsule; take 1 capsule daily; Therapy: (Recorded:08Apr2014) to Recorded   12  [employer] Fish Oil CAPS; TAKE 1 CAPSULE Daily Recorded   13  Dulera 200-5 MCG/ACT Inhalation Aerosol; INHALE 2 PUFFS TWICE DAILY; Therapy: 47SWR0294 to (Last Rx:19Nov2013) Ordered   14  Felodipine ER 5 MG Oral Tablet Extended Release 24 Hour; TAKE 1 TABLET ONCE    DAILY  Requested for: 72Qku3735; Last Rx:86Ank1661 Ordered   15  Flax Seed Oil 1000 MG Oral Capsule; 2 tabs daily; Therapy: 69JGG9395 to  Requested for: 16GCZ8560 Recorded   16  Furosemide 40 MG Oral Tablet; TAKE 1 TABLET DAILY AS DIRECTED; Therapy: (Recorded:08Apr2014) to Recorded   17  Ginkgo Biloba 120 MG Oral Capsule; Take 1 capsule twice daily; Therapy: (Recorded:21Apr2014) to Recorded   18   Glucosamine 1500 Complex Oral Capsule; TAKE 2 CAPSULE Twice daily; Therapy: (Recorded:08Apr2014) to Recorded   19  Lactaid 4500 UNIT Oral Tablet Chewable; CHEW AND SWALLOW 1 TO 3 TABLETS    WITH FIRST BITE OF MILK FOOD; Therapy: (Recorded:21Apr2014) to Recorded   20  L-Lysine 500 MG Oral Tablet; 1 tab daily; Therapy: 13DMD5586 to  Requested for: 96ZRN1336 Recorded   21  Lyrica 150 MG Oral Capsule; TAKE 1 CAPSULE 3 TIMES DAILY; Therapy: 21Jan2016 to (Evaluate:67Hwf4147); Last Rx:21Jan2016 Ordered   22  Lyrica 150 MG Oral Capsule; TAKE 1 CAPSULE TWICE DAILY; Therapy: 26SST1455 to (Evaluate:19Apr2016); Last Rx:20Jan2016 Ordered   23  Magnesium 500 MG Oral Capsule; take 1 capsule daily; Therapy: (Recorded:21Apr2014) to Recorded   24  Nitrostat 0 4 MG Sublingual Tablet Sublingual; DISSOLVE 1 TABLET UNDER THE    TONGUE AS NEEDED FOR CHEST PAIN;    Therapy: 19VHZ7722 to (Evaluate:29Oct2015)  Requested for: 99Euw4132; Last    Rx:46Fyf9447 Ordered   25  Oxycodone-Acetaminophen 5-325 MG Oral Tablet; TAKE 1 TABLET EVERY 12 HOURS    AS NEEDED FOR PAIN;    Therapy: 52Pkp7997 to (Evaluate:50Hry1199); Last Rx:08Mgg4468 Ordered   26  Oxycodone-Acetaminophen 5-325 MG Oral Tablet; TAKE 1 TABLET EVERY 6 HOURS    AS NEEDED FOR PAIN;    Therapy: 69ZRS2394 to (Evaluate:13Rkc4796); Last Rx:16Bhc0286 Ordered   27  ProAir  (90 Base) MCG/ACT Inhalation Aerosol Solution; 2 puffs Q4 hours prn    SOB/wheeze; Therapy: 83SMK4624 to (Last Rx:19Nov2013)  Requested for: 16DCA2040 Ordered   28  Selenium  MCG Oral Tablet Extended Release; 1 tab daily; Therapy: 00YPO2432 to  Requested for: 07ZVV6690 Recorded   29  Simvastatin 20 MG Oral Tablet; 1 TAB DAILY; Therapy: 21DOI2692 to (Last MP:33AYZ0721)  Requested for: 02Jun2015 Ordered   30  Super B Complex-100 TABS; TAKE 1 TABLET DAILY AS DIRECTED; Therapy: (Recorded:08Apr2014) to Recorded   31   TraMADol HCl - 50 MG Oral Tablet; TAKE 1 TABLET 4 TIMES DAILY AS NEEDED FOR PAIN; Last Rx:20Jan2016 Ordered   32  Darreld Iwona Pressair 400 MCG/ACT Inhalation Aerosol Powder Breath Activated; INHALE 1    PUFFS Daily; Therapy: 42LYV6244 to (Last Rx:06Mtm1911) Ordered   33  Vitamin C 500 MG Oral Tablet; TAKE 1 TABLET DAILY; Therapy: (Recorded:21Apr2014) to Recorded   34  Vitamin D 1000 UNIT Oral Tablet; TAKE 1 TABLET DAILY; Therapy: (Recorded:08Apr2014) to Recorded   35  Vitamin E 400 UNIT Oral Capsule; 1 tab daily; Therapy: 25IAQ2996 to  Requested for: 81NEB4002 Recorded   36  Xarelto 20 MG Oral Tablet; One a day; Therapy: 70RUF7094 to (Last Rx:61Rvq3765) Ordered   37  Zinc 50 MG Oral Tablet; TAKE 1 TABLET DAILY; Therapy: (Recorded:08Apr2014) to Recorded    The medication list was reviewed and updated today  Allergies    1  No Known Drug Allergies    Vitals   Recorded: 93CZU7631 03:09PM   Height 6 ft    Weight 333 lb    BMI Calculated 45 16   BSA Calculated 2 65     Physical Exam  Left Foot: Appearance: Normal except as noted: excessive pronation and pes planus  Second toe deformities include hammer toe  Third toe deformities include hammer toe  Forth toe deformities include hammer toe  Fifth toe deformities include hammer toe  Tenderness: None except the great toe, distal first metatarsal, distal fifth metatarsal and insertion of the plantar fascia  Positive Brian sign third left intermetatarsal space  ROM: Full  Motor: Normal   Right Foot: Appearance: Normal except as noted: excessive pronation and pes planus  Second toe deformities include hammer toe  Third toe deformities include hammer toe  Forth toe deformities include hammer toe  Fifth toe deformities include hammer toe  Evaluation of the great toe nail demonstrates an ingrown nail  Tenderness: None except the insertion of the plantar fascia  Left Ankle: Appearance: Normal except erythema, swelling and swelling laterally  Tenderness: None except the tibiotalar joint  Right Ankle: ROM: Full     Neurological Exam: performed  Deep tendon reflexes: + tinel of right tibial nerve  Vascular Exam: performed Dorsalis pedis pulses were present bilaterally  Posterior tibial pulses were present bilaterally  Elevation Pallor: absent bilaterally  Capillary refill time was less than 1 second bilaterally  Edema: moderate bilaterally and 4/7 pitting  neg  buddy  Toenails: All of the toenails were elongated, hypertrophied, discolored, ingrown, shown to have subungual debris, tender and Mycotic with onychauxis  Hyperkeratosis: present on both first toes, present on both first sub metatarsals and present on both fifth sub metatarsals  Shoe Gear Evaluation: performed ()  Recommendation(s): extra depth diabetic shoes  Procedure  Patient be referred to orthopedics for radiculopathy workup  Patient will have bilateral heel injections done  Through a medial approach, 1 5 cc of Kenalog-10 was injected without complication        Future Appointments    Date/Time Provider Specialty Site   04/14/2016 09:45 AM Silvio Ramos DPM [facility]     Signatures   Electronically signed by : Juana Kim DPM; Jan 28 2016  3:25PM EST                       (Author)

## 2018-01-10 NOTE — MISCELLANEOUS
Message   Recorded as Task   Date: 02/18/2016 12:32 PM, Created By: [email or web address]   Task Name: Care Coordination   Assigned To: Yuridia Contreras   Regarding Patient: Henri Ojeda, Status: Active   Comment:    [email or web address] - 18 Feb 2016 12:32 PM     TASK CREATED  Received anticoagulant hold instructions regarding holding clopidogrel and rivaroxaban from Dr Joseph Fisher  He DOES NOT give permission to hold these medications and states that it is the patient's perogative if the patient wants to assume the risk of being off of the anticoagulants listed above  The patient must assume the increased risk of being off of the medications (stroke, etc ) as per the patient's discussion with Dr Joseph Fisher  Please remind patient that this is an elective procedure and that he does not have to get this done  However, I am willing to proceed with the injection if the patient is willing to accept that he has an increased risk of complications as a result of being off of his anticoagulants  Yuridia Contreras - 22 Feb 2016 2:29 PM     TASK EDITED  Spoke to pt, he is aware of risks and if he decides to continue with the injection he will let the office know  Pt is scheduled for 3/10/16 L4-L5 LESI  Pt will need to hold plavix 7 days prior & xarelto 2 days prior  Pt requested that I call him on 3/2/16 and he will let mek now whether he will have injection done  [email or web address] - 22 Feb 2016 2:34 PM     TASK REPLIED TO: Previously Assigned To [place]  Thanks  Stavropoulos,Yuridia - 22 Feb 2016 2:55 PM     TASK EDITED   Yimi Martinez - 29 Feb 2016 5:14 PM     TASK EDITED  Spoke to pt, he stated that he does want to have injection done on 3/10/16  Instructed pt to stop taking plavix starting 3/3/16 and to stop taking xarelto starting 3/8/16  Pt verbalized understaqnding  [facility] [email or web address] - 29 Feb 2016 5:15 PM     TASK REPLIED TO: Previously Assigned To [place]  Thanks  Active Problems    1  Acquired ankle/foot deformity (736 70) (M21 969)   2  Acute gout (274 01) (M10 9)   3  Acute gouty arthritis (274 01) (M10 00)   4  Allergic rhinitis (477 9) (J30 9)   5  Aneurysm of abdominal aorta (441 4) (I71 4)   6  Aneurysm of abdominal aorta (441 4) (I71 4)   7  Angina pectoris (413 9) (I20 9)   8  Arteriosclerosis of coronary artery (414 00) (I25 10)   9  Arthralgia of ankle or foot (719 47) (M25 579)   10  Atherosclerosis of arteries of extremities (440 20) (I70 209)   11  Atrial flutter (427 32) (I48 92)   12  Benign essential hypertension (401 1) (I10)   13  Benign hypertensive CKD (403 10) (I12 9)   14  Benign Localized Prostatic Hyperplasia Without Urinary Obstruction With Other Lower    Urinary Tract Symptoms (600 20)   15  Benign prostatic hypertrophy (600 00) (N40 0)   16  Bilateral lumbar radiculopathy (724 4) (M54 16)   17  Blurred vision (368 8) (H53 8)   18  Callus (700) (L84)   19  Carpal tunnel syndrome, unspecified laterality (354 0) (G56 00)   20  Cellulitis (682 9) (L03 90)   21  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)   22  Chronic kidney disease, stage 3 (585 3) (N18 3)   23  Chronic obstructive pulmonary disease (496) (J44 9)   24  Chronic obstructive pulmonary disease (496) (J44 9)   25  Chronic pain syndrome (338 4) (G89 4)   26  Colon cancer screening (V76 51) (Z12 11)   27  Colon, diverticulosis (562 10) (K57 30)   28  Congestive heart failure (428 0) (I50 9)   29  Coronary artery disease (414 00) (I25 10)   30  Deep venous insufficiency (459 81) (I87 2)   31  Degenerative disc disease, lumbar (722 52) (M51 36)   32  Difficulty in walking (719 7) (R26 2)   33  Fecal soiling (787 62) (R15 1)   34  Fibromyalgia (729 1) (M79 7)   35  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 3)   36  Gastrointestinal symptoms (787 99) (R19 8)   37  Gout (274 9) (M10 9)   38  Hyperkalemia (276 7) (E87 5)   39  Hyperlipidemia (272 4) (E78 5)   40  Limb pain (729 5) (M79 609)   41   Lumbar canal stenosis (724 02) (M48 06)   42  Lumbar neuritis (724 4) (M54 16)   43  Memory Lapses Or Loss (780 93)   44  Microscopic hematuria (599 72) (R31 2)   45  Moderate or severe vision impairment, one eye (369 60) (H54 40)   46  Morbid or severe obesity due to excess calories (278 01) (E66 01)   47  Morbid or severe obesity due to excess calories (278 01) (E66 01)   48  Needs flu shot (V04 81) (Z23)   49  Nicotine dependence (305 1) (F17 200)   50  Olecranon bursitis (726 33) (M70 20)   51  Onychogryphosis (703 8)   52  Onychomycosis (110 1) (B35 1)   53  Osteoarthritis, knee/lower leg (715 96) (M17 9)   54  Pain in joints of both feet (719 47) (M79 671,M79 672)   55  Paronychia of toe, unspecified laterality (681 11) (L03 039)   56  Permanent Pacemaker Type Dual-Chamber   57  Pes planus, congenital (754 61) (Q66 50)   58  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)   59  Presence of cardiac pacemaker (V45 01) (Z95 0)   60  Proteinuria (791 0) (R80 9)   61  Radiculopathy (729 2) (M54 10)   62  Screening for genitourinary condition (V81 6) (Z13 89)   63  Sleep apnea (780 57) (G47 30)   64  Sleep apnea (780 57) (G47 30)   65  Tendonitis (726 90) (M77 9)   66  Urinary incontinence (788 30) (R32)   67  Urinary tract infection (599 0) (N39 0)   68  Venous insufficiency (chronic) (peripheral) (459 81) (I87 2)    Current Meds   1  Allopurinol 100 MG Oral Tablet; TAKE 1 TABLET DAILY FOR GOUT;   Therapy: 49BAF7457 to (Last Rx:04Xgw0626)  Requested for: 08Vsr8699 Ordered   2  B Complex Oral Capsule; TAKE 1 CAPSULE DAILY; Therapy: (Recorded:26Tip7201) to Recorded   3  Biotin 1000 MCG Oral Tablet; Take 1 tablet daily; Therapy: (Recorded:26Eyo2359) to Recorded   4  Calcium-Carb 600 600 MG Oral Tablet; 1 tab daily; Therapy: 25DPM7646 to  Requested for: 92DJY9239 Recorded   5  Centrum Silver Ultra Mens Oral Tablet; 1 tab daily; Therapy: 60SXM8253 to  Requested for: 76PAI0036 Recorded   6  Cinnamon 500 MG Oral Capsule;  Take 1 capsule twice daily; Therapy: (Recorded:08Apr2014) to Recorded   7  Clopidogrel Bisulfate 75 MG Oral Tablet (Plavix); take 1 tablet by mouth once daily; Therapy: 33KUR4628 to (Evaluate:16Oct2016)  Requested for: 84SWH7177; Last   Rx:22Oct2015 Ordered   8  Colcrys 0 6 MG Oral Tablet; TAKE 1 TABLET DAILY AS DIRECTED; Therapy: 52AOT9827 to (Evaluate:87Bmd4842); Last Rx:95Hys2547 Ordered   9  Colcrys 0 6 MG Oral Tablet; TAKE 1 TABLET DAILY AS DIRECTED; Therapy: 96PUA3670 to (Evaluate:00Cak6548)  Requested for: 44BGP6991; Last   Rx:22Rky3834 Ordered   10  Colcrys 0 6 MG Oral Tablet; USE AS DIRECTED; Therapy: 20Qwh1735 to (Evaluate:23Jan2016)  Requested for: 20Jan2016; Last    Rx:20Jan2016 Ordered   11  Cranberry Fruit Concentrate 79065 MG Oral Capsule; take 1 capsule daily; Therapy: (Recorded:08Apr2014) to Recorded   12  [employer] Fish Oil CAPS; TAKE 1 CAPSULE Daily Recorded   13  Dulera 200-5 MCG/ACT Inhalation Aerosol; INHALE 2 PUFFS TWICE DAILY; Therapy: 63LCF8543 to (Last Rx:19Nov2013) Ordered   14  Felodipine ER 5 MG Oral Tablet Extended Release 24 Hour; TAKE 1 TABLET ONCE    DAILY  Requested for: 38Anv6780; Last Rx:56Gtg1300 Ordered   15  Flax Seed Oil 1000 MG Oral Capsule; 2 tabs daily; Therapy: 85GRO9965 to  Requested for: 51PUG8824 Recorded   16  Furosemide 40 MG Oral Tablet; TAKE 1 TABLET DAILY AS DIRECTED; Therapy: (Recorded:08Apr2014) to Recorded   17  Ginkgo Biloba 120 MG Oral Capsule; Take 1 capsule twice daily; Therapy: (Recorded:21Apr2014) to Recorded   18  Glucosamine 1500 Complex Oral Capsule; TAKE 2 CAPSULE Twice daily; Therapy: (Recorded:08Apr2014) to Recorded   19  Lactaid 4500 UNIT Oral Tablet Chewable; CHEW AND SWALLOW 1 TO 3 TABLETS    WITH FIRST BITE OF MILK FOOD; Therapy: (Recorded:95Afl9159) to Recorded   20  L-Lysine 500 MG Oral Tablet; 1 tab daily; Therapy: 52PHR6241 to  Requested for: 26UCM4151 Recorded   21  Lyrica 150 MG Oral Capsule; TAKE 1 CAPSULE TWICE DAILY;     Therapy: 61CMT9293 to (Evaluate:19Apr2016); Last Rx:20Jan2016 Ordered   22  Magnesium 500 MG Oral Capsule; take 1 capsule daily; Therapy: (Recorded:21Apr2014) to Recorded   23  Nitrostat 0 4 MG Sublingual Tablet Sublingual; DISSOLVE 1 TABLET UNDER THE    TONGUE AS NEEDED FOR CHEST PAIN;    Therapy: 56EMB9870 to (Evaluate:29Oct2015)  Requested for: 13Yel4313; Last    Rx:86Nem4525 Ordered   24  Oxycodone-Acetaminophen 5-325 MG Oral Tablet; TAKE 1 TABLET EVERY 12 HOURS    AS NEEDED FOR PAIN;    Therapy: 39Ozr8787 to (Evaluate:14Qmr2117); Last Rx:70Smt4197 Ordered   25  Oxycodone-Acetaminophen 5-325 MG Oral Tablet; TAKE 1 TABLET EVERY 6 HOURS    AS NEEDED FOR PAIN;    Therapy: 43BUG2369 to (Evaluate:89Lsb9949); Last Rx:89Esz0744 Ordered   26  ProAir  (90 Base) MCG/ACT Inhalation Aerosol Solution; 2 puffs Q4 hours prn    SOB/wheeze; Therapy: 80KIK8972 to (Last Rx:19Nov2013)  Requested for: 88HXE8525 Ordered   27  Selenium  MCG Oral Tablet Extended Release; 1 tab daily; Therapy: 95XYG9402 to  Requested for: 03RNV0258 Recorded   28  Simvastatin 20 MG Oral Tablet (Zocor); 1 TAB DAILY; Therapy: 56UDE1735 to (Last LA:38KKJ0353)  Requested for: 02Jun2015 Ordered   29  Super B Complex-100 TABS; TAKE 1 TABLET DAILY AS DIRECTED; Therapy: (Recorded:08Apr2014) to Recorded   30  TraMADol HCl - 50 MG Oral Tablet; TAKE 1 TABLET 4 TIMES DAILY AS NEEDED FOR    PAIN; Last Rx:20Jan2016 Ordered   31  Vaishnavi Pillion Pressair 400 MCG/ACT Inhalation Aerosol Powder Breath Activated; INHALE 1    PUFFS Daily; Therapy: 20AYU5712 to (Last Rx:66Rzq4775) Ordered   32  Vitamin C 500 MG Oral Tablet; TAKE 1 TABLET DAILY; Therapy: (Recorded:21Apr2014) to Recorded   33  Vitamin D 1000 UNIT Oral Tablet; TAKE 1 TABLET DAILY; Therapy: (Recorded:08Apr2014) to Recorded   34  Vitamin E 400 UNIT Oral Capsule; 1 tab daily; Therapy: 25ADT2006 to  Requested for: 00MKS9953 Recorded   35  Xarelto 20 MG Oral Tablet; One a day;     Therapy: 79YQD2991 to (Last Rx:04Kmp8590) Ordered   36  Zinc 50 MG Oral Tablet; TAKE 1 TABLET DAILY; Therapy: (Recorded:08Apr2014) to Recorded    Allergies    1   No Known Drug Allergies    Signatures   Electronically signed by : Stella Elliott RN; Feb 29 2016  5:16PM EST                       (Author)

## 2018-01-11 NOTE — MISCELLANEOUS
Message   Recorded as Task   Date: 03/07/2016 11:21 AM, Created By: Dewayne Wong   Task Name: Follow Up   Assigned To: [address]   Regarding Patient: Henri Ojeda, Status: Active   CommentKatharina Back - 07 Mar 2016 11:21 AM     TASK CREATED  Caller: Shiv Srivastava; General Medical Question; [phone] (Home)  Received VM from pt's wife, Paulo Carrillo, on [facility] triage line from 80 am  Paulo Carrillo states that the pt  is scheduled for a procedure on 3/10 and she needs to s/w someone about his medications  Paulo Carrillo requesting c/b at [phone]  Yimi Pancoast - 07 Mar 2016 12:13 PM     TASK EDITED  Spoke to pt, states that he told his wife that he had spoken to me last week and received instructions for holding plavix & xarelto, however she would like to speak with a nurse  Wife Paulo Carrillo was not at home at present and will call the office back per pt  Yuridia Contreras - 08 Mar 2016 8:57 AM     TASK EDITED  Spoke to wife, pt did not stop holding plavix as instructed for 7 days prior  Instead he started 5 days prior and 3 days prior for xarelto instead of 2 days as instructed  Pt will need to be rescheduled for injection  Yuridia Contreras - 08 Mar 2016 2:50 PM     TASK EDITED  Spoke to both Beulah Ernesto & his wife Paulo Carrillo  Pt has been rescheduled to 3/17/16 for injection  Both were instructed for the pt to resume medications and to begin holding Giovanny's plavix starting 3/10 and xarelto starting 3/15  Both verbalized understanding  Maria M Joshua - 08 Mar 2016 4:08 PM     TASK REPLIED TO: Previously Assigned To [place]  Thanks  Active Problems    1  Acquired ankle/foot deformity (736 70) (M21 969)   2  Acute gout (274 01) (M10 9)   3  Acute gouty arthritis (274 01) (M10 00)   4  Allergic rhinitis (477 9) (J30 9)   5  Aneurysm of abdominal aorta (441 4) (I71 4)   6  Aneurysm of abdominal aorta (441 4) (I71 4)   7  Angina pectoris (413 9) (I20 9)   8  Arteriosclerosis of coronary artery (414 00) (I25 10)   9  Arthralgia of ankle or foot (719 47) (M25 579)   10  Atherosclerosis of arteries of extremities (440 20) (I70 209)   11  Atrial flutter (427 32) (I48 92)   12  Benign essential hypertension (401 1) (I10)   13  Benign hypertensive CKD (403 10) (I12 9)   14  Benign Localized Prostatic Hyperplasia Without Urinary Obstruction With Other Lower    Urinary Tract Symptoms (600 20)   15  Benign prostatic hypertrophy (600 00) (N40 0)   16  Bilateral lumbar radiculopathy (724 4) (M54 16)   17  Blurred vision (368 8) (H53 8)   18  Callus (700) (L84)   19  Carpal tunnel syndrome, unspecified laterality (354 0) (G56 00)   20  Cellulitis (682 9) (L03 90)   21  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)   22  Chronic kidney disease, stage 3 (585 3) (N18 3)   23  Chronic obstructive pulmonary disease (496) (J44 9)   24  Chronic obstructive pulmonary disease (496) (J44 9)   25  Chronic pain syndrome (338 4) (G89 4)   26  Colon cancer screening (V76 51) (Z12 11)   27  Colon, diverticulosis (562 10) (K57 30)   28  Congestive heart failure (428 0) (I50 9)   29  Coronary artery disease (414 00) (I25 10)   30  Deep venous insufficiency (459 81) (I87 2)   31  Degenerative disc disease, lumbar (722 52) (M51 36)   32  Difficulty in walking (719 7) (R26 2)   33  Fecal soiling (787 62) (R15 1)   34  Fibromyalgia (729 1) (M79 7)   35  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 3)   36  Gastrointestinal symptoms (787 99) (R19 8)   37  Gout (274 9) (M10 9)   38  Hyperkalemia (276 7) (E87 5)   39  Hyperlipidemia (272 4) (E78 5)   40  Limb pain (729 5) (M79 609)   41  Lumbar canal stenosis (724 02) (M48 06)   42  Lumbar neuritis (724 4) (M54 16)   43  Memory Lapses Or Loss (780 93)   44  Microscopic hematuria (599 72) (R31 2)   45  Moderate or severe vision impairment, one eye (369 60) (H54 40)   46  Morbid or severe obesity due to excess calories (278 01) (E66 01)   47   Morbid or severe obesity due to excess calories (278 01) (E66 01)   48  Needs flu shot (V04 81) (Z23)   49  Nicotine dependence (305 1) (F17 200)   50  Olecranon bursitis (726 33) (M70 20)   51  Onychogryphosis (703 8)   52  Onychomycosis (110 1) (B35 1)   53  Osteoarthritis, knee/lower leg (715 96) (M17 9)   54  Pain in joints of both feet (719 47) (M79 671,M79 672)   55  Paronychia of toe, unspecified laterality (681 11) (L03 039)   56  Permanent Pacemaker Type Dual-Chamber   57  Pes planus, congenital (754 61) (Q66 50)   58  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)   59  Presence of cardiac pacemaker (V45 01) (Z95 0)   60  Proteinuria (791 0) (R80 9)   61  Radiculopathy (729 2) (M54 10)   62  Screening for genitourinary condition (V81 6) (Z13 89)   63  Sleep apnea (780 57) (G47 30)   64  Sleep apnea (780 57) (G47 30)   65  Tendonitis (726 90) (M77 9)   66  Urinary incontinence (788 30) (R32)   67  Urinary tract infection (599 0) (N39 0)   68  Venous insufficiency (chronic) (peripheral) (459 81) (I87 2)    Current Meds   1  Allopurinol 100 MG Oral Tablet; TAKE 1 TABLET DAILY FOR GOUT;   Therapy: 22NRY0101 to (Last Rx:00Bof8222)  Requested for: 68Qzd9997 Ordered   2  B Complex Oral Capsule; TAKE 1 CAPSULE DAILY; Therapy: (Recorded:37Zrl5940) to Recorded   3  Biotin 1000 MCG Oral Tablet; Take 1 tablet daily; Therapy: (Recorded:51Wzm0857) to Recorded   4  Calcium-Carb 600 600 MG Oral Tablet; 1 tab daily; Therapy: 80PFV2534 to  Requested for: 84YBD5533 Recorded   5  Centrum Silver Ultra Mens Oral Tablet; 1 tab daily; Therapy: 00MMQ4828 to  Requested for: 69CTY1930 Recorded   6  Cinnamon 500 MG Oral Capsule; Take 1 capsule twice daily; Therapy: (Recorded:98Ada8314) to Recorded   7  Clopidogrel Bisulfate 75 MG Oral Tablet (Plavix); take 1 tablet by mouth once daily; Therapy: 86HDD1859 to (Evaluate:16Oct2016)  Requested for: 56DGD4065; Last   Rx:22Oct2015 Ordered   8  Colcrys 0 6 MG Oral Tablet; TAKE 1 TABLET DAILY AS DIRECTED;    Therapy: 00IWL1015 to (Evaluate:17Czq3912); Last Rx:60Tlk1390 Ordered   9  Colcrys 0 6 MG Oral Tablet; TAKE 1 TABLET DAILY AS DIRECTED; Therapy: 58OYZ7877 to (Evaluate:44Aau8411)  Requested for: 03LGO2966; Last   Rx:46Hnz4692 Ordered   10  Colcrys 0 6 MG Oral Tablet; USE AS DIRECTED; Therapy: 95Gzt8256 to (Evaluate:23Jan2016)  Requested for: 20Jan2016; Last    Rx:20Jan2016 Ordered   11  Cranberry Fruit Concentrate 97333 MG Oral Capsule; take 1 capsule daily; Therapy: (Recorded:08Apr2014) to Recorded   12  [employer] Fish Oil CAPS; TAKE 1 CAPSULE Daily Recorded   13  Dulera 200-5 MCG/ACT Inhalation Aerosol; INHALE 2 PUFFS TWICE DAILY; Therapy: 67XZO7515 to (Last Rx:19Nov2013) Ordered   14  Felodipine ER 5 MG Oral Tablet Extended Release 24 Hour; TAKE 1 TABLET ONCE    DAILY  Requested for: 67Hlk8525; Last Rx:56Hwz5470 Ordered   15  Flax Seed Oil 1000 MG Oral Capsule; 2 tabs daily; Therapy: 26UDP2070 to  Requested for: 84MSL2875 Recorded   16  Furosemide 40 MG Oral Tablet; TAKE 1 TABLET DAILY AS DIRECTED; Therapy: (Recorded:08Apr2014) to Recorded   17  Ginkgo Biloba 120 MG Oral Capsule; Take 1 capsule twice daily; Therapy: (Recorded:04Iiu9649) to Recorded   18  Glucosamine 1500 Complex Oral Capsule; TAKE 2 CAPSULE Twice daily; Therapy: (Recorded:08Apr2014) to Recorded   19  Lactaid 4500 UNIT Oral Tablet Chewable; CHEW AND SWALLOW 1 TO 3 TABLETS    WITH FIRST BITE OF MILK FOOD; Therapy: (Recorded:59Ygk0683) to Recorded   20  L-Lysine 500 MG Oral Tablet; 1 tab daily; Therapy: 70RHI8410 to  Requested for: 50YLN7005 Recorded   21  Lyrica 150 MG Oral Capsule; TAKE 1 CAPSULE TWICE DAILY; Therapy: 16AFK6042 to (Evaluate:19Apr2016); Last Rx:20Jan2016 Ordered   22  Magnesium 500 MG Oral Capsule; take 1 capsule daily; Therapy: (Recorded:75Kif6859) to Recorded   23   Nitrostat 0 4 MG Sublingual Tablet Sublingual; DISSOLVE 1 TABLET UNDER THE    TONGUE AS NEEDED FOR CHEST PAIN;    Therapy: 06EYN4652 to ([phone])  Requested for: 48Sqd0974; Last    Rx:65Blj5831 Ordered   24  Oxycodone-Acetaminophen 5-325 MG Oral Tablet; TAKE 1 TABLET EVERY 12 HOURS    AS NEEDED FOR PAIN;    Therapy: 59Unl8523 to (Evaluate:30Mkp4100); Last Rx:94Bwy2156 Ordered   25  Oxycodone-Acetaminophen 5-325 MG Oral Tablet; TAKE 1 TABLET EVERY 6 HOURS    AS NEEDED FOR PAIN;    Therapy: 24OZA5252 to (Evaluate:14Knt1713); Last Rx:91Ydz3985 Ordered   26  ProAir  (90 Base) MCG/ACT Inhalation Aerosol Solution; 2 puffs Q4 hours prn    SOB/wheeze; Therapy: 14QML3688 to (Last Rx:19Nov2013)  Requested for: 49UGJ9444 Ordered   27  Selenium  MCG Oral Tablet Extended Release; 1 tab daily; Therapy: 86PXH8484 to  Requested for: 23FDQ4221 Recorded   28  Simvastatin 20 MG Oral Tablet (Zocor); 1 TAB DAILY; Therapy: 72KWM7989 to (Last TN:27TTU9836)  Requested for: 02Jun2015 Ordered   29  Super B Complex-100 TABS; TAKE 1 TABLET DAILY AS DIRECTED; Therapy: (Recorded:08Apr2014) to Recorded   30  TraMADol HCl - 50 MG Oral Tablet; TAKE 1 TABLET 4 TIMES DAILY AS NEEDED FOR    PAIN; Last Rx:20Jan2016 Ordered   31  [employer] Pressair 400 MCG/ACT Inhalation Aerosol Powder Breath Activated; INHALE 1    PUFFS Daily; Therapy: 57TTH9533 to (Last Rx:98Xcs9243) Ordered   32  Vitamin C 500 MG Oral Tablet; TAKE 1 TABLET DAILY; Therapy: (Recorded:21Apr2014) to Recorded   33  Vitamin D 1000 UNIT Oral Tablet; TAKE 1 TABLET DAILY; Therapy: (Recorded:08Apr2014) to Recorded   34  Vitamin E 400 UNIT Oral Capsule; 1 tab daily; Therapy: 90XPT7656 to  Requested for: 98RCS8635 Recorded   35  Xarelto 20 MG Oral Tablet; One a day; Therapy: 89CNH0808 to (Last Rx:49See7687) Ordered   36  Zinc 50 MG Oral Tablet; TAKE 1 TABLET DAILY; Therapy: (Recorded:08Apr2014) to Recorded    Allergies    1   No Known Drug Allergies    Signatures   Electronically signed by : Jean Glez RN; Mar  8 2016  4:11PM EST                       (Author)

## 2018-01-11 NOTE — MISCELLANEOUS
Message   Recorded as Task   Date: 02/01/2017 04:14 PM, Created By: Octavia Mckeon   Task Name: Miscellaneous   Assigned To: Octavia Mckeon   Regarding Patient: Alexia Sotelo, Status: Active   CommentSenrike Thompsons - 01 Feb 2017 4:14 PM     TASK CREATED  Caller: Self; [phone] (Home); [phone] (Work)  Patient called and asked about his water pill  He used to be on it but was told to stop for a few days and then start it back up but the patient never started it back up and he says he has no swelling he wants to know if he can just stay off the water and monitor is weights? Russell Howard - 91 Feb 2017 12:21 PM     TASK REPLIED TO: Previously Assigned To [facility]  Yes, Agree to continue to hold Lasix unless has worsening leg swelling, or dyspnea  His Cr improved also  Active Problems    1  Abrasion (919 0) (T14 8)   2  Acquired ankle/foot deformity (736 70) (M21 969)   3  Acute gout (274 01) (M10 9)   4  Acute gouty arthritis (274 01) (M10 9)   5  SHAWNEE (acute kidney injury) (584 9) (N17 9)   6  Allergic rhinitis (477 9) (J30 9)   7  Aneurysm of abdominal aorta (441 4) (I71 4)   8  Aneurysm of abdominal aorta (441 4) (I71 4)   9  Angina pectoris (413 9) (I20 9)   10  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)   11  Arteriosclerosis of coronary artery (414 00) (I25 10)   12  Arthralgia of ankle or foot (719 47) (M25 579)   13  Atherosclerosis of arteries of extremities (440 20) (I70 209)   14  Atrial flutter (427 32) (I48 92)   15  Benign essential hypertension (401 1) (I10)   16  Benign hypertensive CKD (403 10) (I12 9)   17  Benign Localized Prostatic Hyperplasia Without Urinary Obstruction With Other Lower    Urinary Tract Symptoms (600 20)   18  Benign prostatic hypertrophy (600 00) (N40 0)   19  Bilateral edema of lower extremity (782 3) (R60 0)   20  Bilateral lumbar radiculopathy (724 4) (M54 16)   21  Blurred vision (368 8) (H53 8)   22   Body mass index (BMI) of 45 0 to 49 9 in adult (V85 42) (Z68 42)   23  Callus (700) (L84)   24  Carpal tunnel syndrome, unspecified laterality (354 0) (G56 00)   25  Cellulitis (682 9) (L03 90)   26  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)   27  Chronic kidney disease, stage 3 (585 3) (N18 3)   28  Chronic low back pain (724 2,338 29) (M54 5,G89 29)   29  Chronic obstructive pulmonary disease (496) (J44 9)   30  Chronic obstructive pulmonary disease (496) (J44 9)   31  Chronic pain syndrome (338 4) (G89 4)   32  Colon cancer screening (V76 51) (Z12 11)   33  Colon, diverticulosis (562 10) (K57 30)   34  Congestive heart failure (428 0) (I50 9)   35  Coronary artery disease (414 00) (I25 10)   36  Deep venous insufficiency (459 81) (I87 2)   37  Degenerative disc disease, lumbar (722 52) (M51 36)   38  Difficulty in walking (719 7) (R26 2)   39  Fecal soiling (787 62) (R15 1)   40  Fibromyalgia (729 1) (M79 7)   41  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 3)   42  Gastrointestinal symptoms (787 99) (R19 8)   43  Gout (274 9) (M10 9)   44  Hyperkalemia (276 7) (E87 5)   45  Hyperlipidemia (272 4) (E78 5)   46  Insomnia, persistent (307 42) (G47 00)   47  Lumbar canal stenosis (724 02) (M48 06)   48  Lumbar neuritis (724 4) (M54 16)   49  Memory Lapses Or Loss (780 93)   50  Microscopic hematuria (599 72) (R31 29)   51  Moderate or severe vision impairment, one eye (369 60) (H54 40)   52  Morbid or severe obesity due to excess calories (278 01) (E66 01)   53  Morbid or severe obesity due to excess calories (278 01) (E66 01)   54  Needs flu shot (V04 81) (Z23)   55  Nicotine dependence (305 1) (F17 200)   56  Olecranon bursitis (726 33) (M70 20)   57  Onychogryphosis (703 8)   58  Onychomycosis (110 1) (B35 1)   59  Osteoarthritis, knee/lower leg (715 96) (M17 9)   60  Pain in joints of both feet (719 47) (M79 671,M79 672)   61  Paronychia of toe, unspecified laterality (681 11) (L03 039)   62  Permanent Pacemaker Type Dual-Chamber   63   Pes planus, congenital (754 61) (Q66 50)   64  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)   65  Presence of cardiac pacemaker (V45 01) (Z95 0)   66  Proteinuria (791 0) (R80 9)   67  Radiculopathy (729 2) (M54 10)   68  Screening for genitourinary condition (V81 6) (Z13 89)   69  Skin tear of right lower leg without complication, initial encounter (891 0) (S81 801A)   70  Sleep apnea (780 57) (G47 30)   71  Sleep apnea (780 57) (G47 30)   72  Tenderness in limb (729 5) (M79 609)   73  Tendonitis (726 90) (M77 9)   74  Urinary incontinence (788 30) (R32)   75  Urinary tract infection (599 0) (N39 0)   76  Venous insufficiency (chronic) (peripheral) (459 81) (I87 2)    Current Meds   1  Allopurinol 100 MG Oral Tablet; TAKE 1 TABLET DAILY FOR  GOUT;   Therapy: 57YTM9210 to (Evaluate:85Ovq5570)  Requested for: 36CRQ1273; Last   Rx:94Pgn4303 Ordered   2  B Complex Oral Capsule; TAKE 1 CAPSULE DAILY; Therapy: (Recorded:36Wis7872) to Recorded   3  Biotin 1000 MCG Oral Tablet; Take 1 tablet daily; Therapy: (Recorded:90Tbw9680) to Recorded   4  Calcium-Carb 600 600 MG Oral Tablet; 1 tab daily; Therapy: 99KRT1695 to  Requested for: 68HZR0551 Recorded   5  Centrum Silver Ultra Mens Oral Tablet; 1 tab daily; Therapy: 57TQK7625 to  Requested for: 16QCR2425 Recorded   6  Cinnamon 500 MG Oral Capsule; Take 1 capsule twice daily; Therapy: (Recorded:57Zry7470) to Recorded   7  ClonazePAM 1 MG Oral Tablet Dispersible; PLACE 1 TABLET ON TONGUE AND ALLOW   TO DISSOLVE TWICE DAILY AS NEEDED; Therapy: 68DCN0448 to (Evaluate:76Hqg4324); Last Rx:32Szu3429 Ordered   8  Clopidogrel Bisulfate 75 MG Oral Tablet; TAKE 1 TABLET BY MOUTH ONCE A DAY; Therapy: 90MRY9981 to (Evaluate:01Ssx8841)  Requested for: 45Wgb7588; Last   Rx:69Vqf0595 Ordered   9  Colcrys 0 6 MG Oral Tablet; TAKE 1 TABLET DAILY AS DIRECTED; Therapy: 35JJO4508 to (Evaluate:21Jun2016); Last Rx:24Mar2016 Ordered   10  Cranberry Fruit Concentrate 34247 MG Oral Capsule;  Take 1 capsule twice daily    Recorded   11  [employer] Fish Oil CAPS; TAKE 1 CAPSULE Daily Recorded   12  Felodipine ER 5 MG Oral Tablet Extended Release 24 Hour; take 1 tablet by mouth once    daily; Therapy: 73NFL4910 to (Evaluate:11Jun2017)  Requested for: 57Odm9269; Last    Rx:23Eqy5692 Ordered   13  Finasteride 5 MG Oral Tablet; 1 DAILY; Therapy: 29Hbu1642 to (Last Rx:98Vaa1458)  Requested for: 73Dlt0422 Ordered   14  Flax Seed Oil 1000 MG Oral Capsule; 2 tabs daily; Therapy: 77VCI1573 to  Requested for: 55RWU0481 Recorded   15  Furosemide 40 MG Oral Tablet; TAKE 1 TABLET DAILY AS DIRECTED; Therapy: (Recorded:08Apr2014) to Recorded   16  Ginkgo Biloba 120 MG Oral Capsule; Take 1 capsule twice daily; Therapy: (Recorded:85Uwg3591) to Recorded   17  Glucosamine 1500 Complex Oral Capsule; TAKE 2 CAPSULE Twice daily; Therapy: (Recorded:08Apr2014) to Recorded   18  Lactaid 4500 UNIT CHEW; CHEW AND SWALLOW 1 TO 3 TABLETS WITH FIRST BITE    OF MILK FOOD; Therapy: (Recorded:21Apr2014) to Recorded   19  L-Lysine 500 MG Oral Tablet; 1 tab daily; Therapy: 02QUV1538 to  Requested for: 58LJM6160 Recorded   20  Magnesium 500 MG Oral Capsule; take 1 capsule daily; Therapy: (Recorded:21Apr2014) to Recorded   21  Nitrostat 0 4 MG Sublingual Tablet Sublingual; DISSOLVE 1 TABLET UNDER THE    TONGUE AS NEEDED FOR CHEST PAIN;    Therapy: 64TBH1727 to (Evaluate:29Oct2015)  Requested for: 90Inp4456; Last    Rx:65Tzy3434 Ordered   22  PARoxetine HCl - 20 MG Oral Tablet; 1 EVERY MORNING; Therapy: 41CFM3928 to (Last Rx:91Ewf7003)  Requested for: 40Qva1912 Ordered   23  ProAir  (90 Base) MCG/ACT Inhalation Aerosol Solution; 2 puffs Q4 hours prn    SOB/wheeze; Therapy: 43CYJ9583 to (Last Rx:19Nov2013)  Requested for: 03DCT8159 Ordered   24  Selenium  MCG Oral Tablet Extended Release; 1 tab daily; Therapy: 24FHN5588 to  Requested for: 91UCH0533 Recorded   25   Silver Sulfadiazine 1 % External Cream; APPLY TO AFFECTED AREA TWICE DAILY AS    DIRECTED; Therapy: 31GQI1838 to (Last Rx:06Apr2016)  Requested for: 06Apr2016 Ordered   26  Simvastatin 20 MG Oral Tablet; 1 TAB DAILY; Therapy: 91AFX9053 to (Last Rx:69Qad3825)  Requested for: 96Uxa9421 Ordered   27  TraMADol HCl - 50 MG Oral Tablet; TAKE 1 TABLET 4 TIMES DAILY AS NEEDED FOR    PAIN; Last Rx:34Ybg8227 Ordered   28  [place] Pressair 400 MCG/ACT Inhalation Aerosol Powder Breath Activated; INHALE 1    PUFFS Daily; Therapy: 78QFO5687 to (Last Rx:12Orw4051) Ordered   29  VESIcare 5 MG Oral Tablet; Take 1 tablet daily Recorded   30  Vitamin C 500 MG Oral Tablet; TAKE 1 TABLET DAILY; Therapy: (Recorded:21Apr2014) to Recorded   31  Vitamin D 1000 UNIT Oral Tablet; TAKE 1 TABLET DAILY; Therapy: (Recorded:08Apr2014) to Recorded   32  Xarelto 20 MG Oral Tablet; One a day; Therapy: 06HUV4059 to (Last Rx:16Zmp8965) Ordered   33  Zinc 50 MG Oral Tablet; TAKE 1 TABLET DAILY; Therapy: (Recorded:08Apr2014) to Recorded    Allergies    1   No Known Drug Allergies    Signatures   Electronically signed by : NICK Can ; Feb 2 2017  2:28PM EST                       (Author)

## 2018-01-12 NOTE — MISCELLANEOUS
Message   Recorded as Task   Date: 04/27/2016 02:39 PM, Created By: Jacques Juarez   Task Name: Miscellaneous   Assigned To: Billie Lakhani   Regarding Patient: Chandrakant Abrams, Status: In Progress   Comment:    Billie Lakhani - 27 Apr 2016 2:39 PM     TASK CREATED  Caller: Self; [phone] (Home); [phone] (Work)  Patient's wife called and states that the Vesicare dose was increased by his urologist to 10 mg daily, and she would like to know if this increased dose is okay with Dr Jd Larsen? Neri Page - 28 Apr 2016 3:47 PM     TASK EDITED  based on his gfr, vesicare 10mg is ok   Billie Lakhani - 29 Apr 2016 8:43 AM     TASK IN PROGRESS   Billie Lakhani - 29 Apr 2016 8:44 AM     TASK EDITED  I left a message for the patient telling him that Vesicare 10 mg is okay from our standpoint  Active Problems    1  Abrasion (919 0) (T14 8)   2  Acquired ankle/foot deformity (736 70) (M21 969)   3  Acute gout (274 01) (M10 9)   4  Acute gouty arthritis (274 01) (M10 00)   5  Allergic rhinitis (477 9) (J30 9)   6  Aneurysm of abdominal aorta (441 4) (I71 4)   7  Aneurysm of abdominal aorta (441 4) (I71 4)   8  Angina pectoris (413 9) (I20 9)   9  Arteriosclerosis of coronary artery (414 00) (I25 10)   10  Arthralgia of ankle or foot (719 47) (M25 579)   11  Atherosclerosis of arteries of extremities (440 20) (I70 209)   12  Atrial flutter (427 32) (I48 92)   13  Benign essential hypertension (401 1) (I10)   14  Benign hypertensive CKD (403 10) (I12 9)   15  Benign Localized Prostatic Hyperplasia Without Urinary Obstruction With Other Lower    Urinary Tract Symptoms (600 20)   16  Benign prostatic hypertrophy (600 00) (N40 0)   17  Bilateral edema of lower extremity (782 3) (R60 0)   18  Bilateral lumbar radiculopathy (724 4) (M54 16)   19  Blurred vision (368 8) (H53 8)   20  Body mass index (BMI) of 45 0 to 49 9 in adult (V85 42) (Z68 42)   21  Callus (700) (L84)   22   Carpal tunnel syndrome, unspecified laterality (354 0) (G56 00)   23  Cellulitis (682 9) (L03 90)   24  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)   25  Chronic kidney disease, stage 3 (585 3) (N18 3)   26  Chronic obstructive pulmonary disease (496) (J44 9)   27  Chronic obstructive pulmonary disease (496) (J44 9)   28  Chronic pain syndrome (338 4) (G89 4)   29  Colon cancer screening (V76 51) (Z12 11)   30  Colon, diverticulosis (562 10) (K57 30)   31  Congestive heart failure (428 0) (I50 9)   32  Coronary artery disease (414 00) (I25 10)   33  Deep venous insufficiency (459 81) (I87 2)   34  Degenerative disc disease, lumbar (722 52) (M51 36)   35  Difficulty in walking (719 7) (R26 2)   36  Fecal soiling (787 62) (R15 1)   37  Fibromyalgia (729 1) (M79 7)   38  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 3)   39  Gastrointestinal symptoms (787 99) (R19 8)   40  Gout (274 9) (M10 9)   41  Hyperkalemia (276 7) (E87 5)   42  Hyperlipidemia (272 4) (E78 5)   43  Lumbar canal stenosis (724 02) (M48 06)   44  Lumbar neuritis (724 4) (M54 16)   45  Memory Lapses Or Loss (780 93)   46  Microscopic hematuria (599 72) (R31 2)   47  Moderate or severe vision impairment, one eye (369 60) (H54 40)   48  Morbid or severe obesity due to excess calories (278 01) (E66 01)   49  Morbid or severe obesity due to excess calories (278 01) (E66 01)   50  Needs flu shot (V04 81) (Z23)   51  Nicotine dependence (305 1) (F17 200)   52  Olecranon bursitis (726 33) (M70 20)   53  Onychogryphosis (703 8)   54  Onychomycosis (110 1) (B35 1)   55  Osteoarthritis, knee/lower leg (715 96) (M17 9)   56  Pain in joints of both feet (719 47) (M79 671,M79 672)   57  Paronychia of toe, unspecified laterality (681 11) (L03 039)   58  Permanent Pacemaker Type Dual-Chamber   59  Pes planus, congenital (754 61) (Q66 50)   60  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)   61  Presence of cardiac pacemaker (V45 01) (Z95 0)   62  Proteinuria (791 0) (R80 9)   63  Radiculopathy (729 2) (M54 10)   64   Screening for genitourinary condition (V81 6) (Z13 89)   65  Skin tear of right lower leg without complication, initial encounter (891 0) (S81 801A)   66  Sleep apnea (780 57) (G47 30)   67  Sleep apnea (780 57) (G47 30)   68  Tenderness in limb (729 5) (M79 609)   69  Tendonitis (726 90) (M77 9)   70  Urinary incontinence (788 30) (R32)   71  Urinary tract infection (599 0) (N39 0)   72  Venous insufficiency (chronic) (peripheral) (459 81) (I87 2)    Current Meds   1  Allopurinol 100 MG Oral Tablet; TAKE 1 TABLET DAILY FOR GOUT;   Therapy: 74XNQ7626 to (Last Rx:06Xgw8743)  Requested for: 85Oma5609 Ordered   2  B Complex Oral Capsule; TAKE 1 CAPSULE DAILY; Therapy: (Recorded:21Apr2014) to Recorded   3  Biotin 1000 MCG Oral Tablet; Take 1 tablet daily; Therapy: (Recorded:08Apr2014) to Recorded   4  Calcium-Carb 600 600 MG Oral Tablet; 1 tab daily; Therapy: 00ZQI1114 to  Requested for: 90ALL0433 Recorded   5  Centrum Silver Ultra Mens Oral Tablet; 1 tab daily; Therapy: 55NMH8300 to  Requested for: 41VUH6786 Recorded   6  Cephalexin 250 MG Oral Tablet (Cephalexin Monohydrate); TAKE 1 TABLET EVERY 6   HOURS DAILY; Therapy: 49IHR5458 to (Evaluate:13Apr2016)  Requested for: 37GOU6240; Last   Rx:06Apr2016 Ordered   7  Cinnamon 500 MG Oral Capsule; Take 1 capsule twice daily; Therapy: (Recorded:31Rys9004) to Recorded   8  Clopidogrel Bisulfate 75 MG Oral Tablet (Plavix); take 1 tablet by mouth once daily; Therapy: 02TAR0108 to (Evaluate:16Oct2016)  Requested for: 65NPM4052; Last   Rx:22Oct2015 Ordered   9  Colcrys 0 6 MG Oral Tablet; TAKE 1 TABLET DAILY AS DIRECTED; Therapy: 36EIS0659 to (Evaluate:21Jun2016); Last Rx:24Mar2016 Ordered   10  Cranberry Fruit Concentrate 83465 MG Oral Capsule; Take 1 capsule twice daily    Recorded   11  [employer] Fish Oil CAPS; TAKE 1 CAPSULE Daily Recorded   12  Dulera 200-5 MCG/ACT Inhalation Aerosol; INHALE 2 PUFFS TWICE DAILY; Therapy: 13GKO2775 to (Last Rx:19Nov2013) Ordered   13  Felodipine ER 5 MG Oral Tablet Extended Release 24 Hour; take 1 tablet once daily     Requested for: 35KUH2283; Last Rx:24Mar2016 Ordered   14  Flax Seed Oil 1000 MG Oral Capsule; 2 tabs daily; Therapy: 45UWP4243 to  Requested for: 00ZXT4339 Recorded   15  Furosemide 40 MG Oral Tablet; TAKE 1 TABLET DAILY AS DIRECTED; Therapy: (Recorded:08Apr2014) to Recorded   16  Ginkgo Biloba 120 MG Oral Capsule; Take 1 capsule twice daily; Therapy: (Recorded:61Scx8195) to Recorded   17  Glucosamine 1500 Complex Oral Capsule; TAKE 2 CAPSULE Twice daily; Therapy: (Recorded:27Olt4609) to Recorded   18  Lactaid 4500 UNIT Oral Tablet Chewable; CHEW AND SWALLOW 1 TO 3 TABLETS    WITH FIRST BITE OF MILK FOOD; Therapy: (Recorded:21Apr2014) to Recorded   19  L-Lysine 500 MG Oral Tablet; 1 tab daily; Therapy: 95ADK7699 to  Requested for: 03OMY7017 Recorded   20  Lyrica 150 MG Oral Capsule; TAKE 1 CAPSULE 3 TIMES DAILY; Therapy: 22GFV8735 to (Evaluate:21Jun2016); Last Rx:23Mar2016 Ordered   21  Magnesium 500 MG Oral Capsule; take 1 capsule daily; Therapy: (Recorded:21Apr2014) to Recorded   22  Nitrostat 0 4 MG Sublingual Tablet Sublingual; DISSOLVE 1 TABLET UNDER THE    TONGUE AS NEEDED FOR CHEST PAIN;    Therapy: 17BTX6639 to (Evaluate:29Oct2015)  Requested for: 33Iwv0064; Last    Rx:94Hpk2685 Ordered   23  Oxycodone-Acetaminophen 5-325 MG Oral Tablet; TAKE 1 TABLET EVERY 6 HOURS    AS NEEDED FOR PAIN;    Therapy: 48AKZ0913 to (Evaluate:93Izb2857); Last Rx:57Icv0996 Ordered   24  ProAir  (90 Base) MCG/ACT Inhalation Aerosol Solution; 2 puffs Q4 hours prn    SOB/wheeze; Therapy: 20XLB1759 to (Last Rx:19Nov2013)  Requested for: 35WUL2857 Ordered   25  Selenium  MCG Oral Tablet Extended Release; 1 tab daily; Therapy: 01VLN2612 to  Requested for: 29GRT8767 Recorded   26  Silver Sulfadiazine 1 % External Cream; APPLY TO AFFECTED AREA TWICE DAILY AS    DIRECTED;     Therapy: 37RUW4569 to (Last Rx:06Apr2016)  Requested for: 36QDJ2193 Ordered   27  Simvastatin 20 MG Oral Tablet (Zocor); 1 TAB DAILY; Therapy: 47XIZ3723 to (Last Rx:28Apr2016)  Requested for: 28Apr2016 Ordered   28  Super B Complex-100 TABS; TAKE 1 TABLET DAILY AS DIRECTED; Therapy: (Recorded:08Apr2014) to Recorded   29  TraMADol HCl - 50 MG Oral Tablet; TAKE 1 TABLET 4 TIMES DAILY AS NEEDED FOR    PAIN; Last Rx:29Apr2016 Ordered   30  Isidor Fusi Pressair 400 MCG/ACT Inhalation Aerosol Powder Breath Activated; INHALE 1    PUFFS Daily; Therapy: 10JRO6388 to (Last Rx:95Sqn9891) Ordered   31  VESIcare 5 MG Oral Tablet; Take 1 tablet daily Recorded   32  Vitamin C 500 MG Oral Tablet; TAKE 1 TABLET DAILY; Therapy: (Recorded:21Apr2014) to Recorded   33  Vitamin D 1000 UNIT Oral Tablet; TAKE 1 TABLET DAILY; Therapy: (Recorded:08Apr2014) to Recorded   34  Vitamin E 400 UNIT Oral Capsule; 1 tab daily; Therapy: 49FAP7258 to  Requested for: 25TWL5168 Recorded   35  Xarelto 20 MG Oral Tablet; One a day; Therapy: 33YZC9108 to (Last Rx:60Muj9296) Ordered   36  Zinc 50 MG Oral Tablet; TAKE 1 TABLET DAILY; Therapy: (Recorded:08Apr2014) to Recorded    Allergies    1   No Known Drug Allergies    Signatures   Electronically signed by : NICK Del Cid ; Apr 29 2016  4:25PM EST

## 2018-01-12 NOTE — RESULT NOTES
Message   Recorded as Task   Date: 06/02/2016 01:43 PM, Created By: Phyllis Blanchard   Task Name: Follow Up   Assigned To: [address] [facility] [address] [facility] Procedure,Team   Regarding Patient: Deana Inman, Status: Active   Comment:    Danae Perez - 02 Jun 2016 1:43 PM     TASK CREATED  Patient reports he received 80% relief from his procedure  Patient states he is very happy with results of the procedure  He has zero pain today  S/p L4-L5 LESI--XARELTO PLAVIX done 5/26/16 by Dr Warren [facility] Jun 2016 4:33 PM     TASK REPLIED TO: Previously Assigned To [place]  Thanks          Signatures   Electronically signed by : Aziza Mccollum, ; Arsenio  3 2016  8:31AM EST                       (Author)

## 2018-01-13 VITALS
WEIGHT: 315 LBS | DIASTOLIC BLOOD PRESSURE: 78 MMHG | RESPIRATION RATE: 21 BRPM | BODY MASS INDEX: 42.66 KG/M2 | HEART RATE: 80 BPM | HEIGHT: 72 IN | SYSTOLIC BLOOD PRESSURE: 141 MMHG

## 2018-01-13 VITALS
SYSTOLIC BLOOD PRESSURE: 144 MMHG | TEMPERATURE: 98.1 F | HEART RATE: 78 BPM | BODY MASS INDEX: 42.66 KG/M2 | RESPIRATION RATE: 22 BRPM | DIASTOLIC BLOOD PRESSURE: 80 MMHG | HEIGHT: 72 IN | WEIGHT: 315 LBS

## 2018-01-13 VITALS
DIASTOLIC BLOOD PRESSURE: 74 MMHG | SYSTOLIC BLOOD PRESSURE: 129 MMHG | HEART RATE: 75 BPM | BODY MASS INDEX: 42.66 KG/M2 | WEIGHT: 315 LBS | RESPIRATION RATE: 20 BRPM | HEIGHT: 72 IN

## 2018-01-13 VITALS
HEIGHT: 72 IN | DIASTOLIC BLOOD PRESSURE: 68 MMHG | WEIGHT: 315 LBS | BODY MASS INDEX: 42.66 KG/M2 | SYSTOLIC BLOOD PRESSURE: 142 MMHG

## 2018-01-13 NOTE — MISCELLANEOUS
Message   Recorded as Task   Date: 03/24/2016 01:57 PM, Created By: Mj Mendoza   Task Name: Follow Up   Assigned To: Danae Perez   Regarding Patient: Andrew Dowd, Status: Active   Comment:    Danae Perez - 24 Mar 2016 1:57 PM     TASK CREATED  Patient states that "he feel great"  He reports that that he has received 90% improvement from the procedure and his pain level is a 1 today  S/P L4-L5 LESI done 3/17/16 by Dr Omaira Viramontes  [address] [address] - 24 Mar 2016 2:13 PM     TASK REPLIED TO: Previously Assigned To [place]  Thanks  Active Problems    1  Acquired ankle/foot deformity (736 70) (M21 969)   2  Acute gout (274 01) (M10 9)   3  Acute gouty arthritis (274 01) (M10 00)   4  Allergic rhinitis (477 9) (J30 9)   5  Aneurysm of abdominal aorta (441 4) (I71 4)   6  Aneurysm of abdominal aorta (441 4) (I71 4)   7  Angina pectoris (413 9) (I20 9)   8  Arteriosclerosis of coronary artery (414 00) (I25 10)   9  Arthralgia of ankle or foot (719 47) (M25 579)   10  Atherosclerosis of arteries of extremities (440 20) (I70 209)   11  Atrial flutter (427 32) (I48 92)   12  Benign essential hypertension (401 1) (I10)   13  Benign hypertensive CKD (403 10) (I12 9)   14  Benign Localized Prostatic Hyperplasia Without Urinary Obstruction With Other Lower    Urinary Tract Symptoms (600 20)   15  Benign prostatic hypertrophy (600 00) (N40 0)   16  Bilateral lumbar radiculopathy (724 4) (M54 16)   17  Blurred vision (368 8) (H53 8)   18  Callus (700) (L84)   19  Carpal tunnel syndrome, unspecified laterality (354 0) (G56 00)   20  Cellulitis (682 9) (L03 90)   21  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)   22  Chronic kidney disease, stage 3 (585 3) (N18 3)   23  Chronic obstructive pulmonary disease (496) (J44 9)   24  Chronic obstructive pulmonary disease (496) (J44 9)   25  Chronic pain syndrome (338 4) (G89 4)   26  Colon cancer screening (V76 51) (Z12 11)   27   Colon, diverticulosis (562 10) (K57 30)   28  Congestive heart failure (428 0) (I50 9)   29  Coronary artery disease (414 00) (I25 10)   30  Deep venous insufficiency (459 81) (I87 2)   31  Degenerative disc disease, lumbar (722 52) (M51 36)   32  Difficulty in walking (719 7) (R26 2)   33  Fecal soiling (787 62) (R15 1)   34  Fibromyalgia (729 1) (M79 7)   35  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 3)   36  Gastrointestinal symptoms (787 99) (R19 8)   37  Gout (274 9) (M10 9)   38  Hyperkalemia (276 7) (E87 5)   39  Hyperlipidemia (272 4) (E78 5)   40  Limb pain (729 5) (M79 609)   41  Lumbar canal stenosis (724 02) (M48 06)   42  Lumbar neuritis (724 4) (M54 16)   43  Memory Lapses Or Loss (780 93)   44  Microscopic hematuria (599 72) (R31 2)   45  Moderate or severe vision impairment, one eye (369 60) (H54 40)   46  Morbid or severe obesity due to excess calories (278 01) (E66 01)   47  Morbid or severe obesity due to excess calories (278 01) (E66 01)   48  Needs flu shot (V04 81) (Z23)   49  Nicotine dependence (305 1) (F17 200)   50  Olecranon bursitis (726 33) (M70 20)   51  Onychogryphosis (703 8)   52  Onychomycosis (110 1) (B35 1)   53  Osteoarthritis, knee/lower leg (715 96) (M17 9)   54  Pain in joints of both feet (719 47) (M79 671,M79 672)   55  Paronychia of toe, unspecified laterality (681 11) (L03 039)   56  Permanent Pacemaker Type Dual-Chamber   57  Pes planus, congenital (754 61) (Q66 50)   58  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)   59  Presence of cardiac pacemaker (V45 01) (Z95 0)   60  Proteinuria (791 0) (R80 9)   61  Radiculopathy (729 2) (M54 10)   62  Screening for genitourinary condition (V81 6) (Z13 89)   63  Sleep apnea (780 57) (G47 30)   64  Sleep apnea (780 57) (G47 30)   65  Tendonitis (726 90) (M77 9)   66  Urinary incontinence (788 30) (R32)   67  Urinary tract infection (599 0) (N39 0)   68  Venous insufficiency (chronic) (peripheral) (459 81) (I87 2)    Current Meds   1   Allopurinol 100 MG Oral Tablet; TAKE 1 TABLET DAILY FOR GOUT;   Therapy: 94LEK1109 to (Last Rx:19Qmn5019)  Requested for: 82Hbs1505 Ordered   2  B Complex Oral Capsule; TAKE 1 CAPSULE DAILY; Therapy: (Recorded:21Apr2014) to Recorded   3  Biotin 1000 MCG Oral Tablet; Take 1 tablet daily; Therapy: (Recorded:08Apr2014) to Recorded   4  Calcium-Carb 600 600 MG Oral Tablet; 1 tab daily; Therapy: 37HKI2438 to  Requested for: 47AVH2918 Recorded   5  Centrum Silver Ultra Mens Oral Tablet; 1 tab daily; Therapy: 21YZC3860 to  Requested for: 61TWP1523 Recorded   6  Cinnamon 500 MG Oral Capsule; Take 1 capsule twice daily; Therapy: (Recorded:08Apr2014) to Recorded   7  Clopidogrel Bisulfate 75 MG Oral Tablet (Plavix); take 1 tablet by mouth once daily; Therapy: 43UEY9995 to (Evaluate:16Oct2016)  Requested for: 38ZWK3414; Last   Rx:22Oct2015 Ordered   8  Colcrys 0 6 MG Oral Tablet; TAKE 1 TABLET DAILY AS DIRECTED; Therapy: 93CLK1997 to (Evaluate:21Jun2016); Last Rx:24Mar2016 Ordered   9  Colcrys 0 6 MG Oral Tablet; TAKE 1 TABLET DAILY AS DIRECTED; Therapy: 57QCR8523 to (Evaluate:17Dec2015)  Requested for: 82EZN9170; Last   Rx:55Fuu4914 Ordered   10  Colcrys 0 6 MG Oral Tablet; USE AS DIRECTED; Therapy: 20Xex2814 to (Evaluate:23Jan2016)  Requested for: 20Jan2016; Last    Rx:20Jan2016 Ordered   11  Cranberry Fruit Concentrate 14352 MG Oral Capsule; take 1 capsule daily; Therapy: (Recorded:08Apr2014) to Recorded   12  [employer] Fish Oil CAPS; TAKE 1 CAPSULE Daily Recorded   13  Dulera 200-5 MCG/ACT Inhalation Aerosol; INHALE 2 PUFFS TWICE DAILY; Therapy: 60CYY8358 to (Last Rx:19Nov2013) Ordered   14  Felodipine ER 5 MG Oral Tablet Extended Release 24 Hour; take 1 tablet once daily     Requested for: 23MGA7132; Last Rx:24Mar2016 Ordered   15  Flax Seed Oil 1000 MG Oral Capsule; 2 tabs daily; Therapy: 82MSW1633 to  Requested for: 47YGM7564 Recorded   16  Furosemide 40 MG Oral Tablet; TAKE 1 TABLET DAILY AS DIRECTED;     Therapy: (Recorded:08Apr2014) to Recorded   17  Ginkgo Biloba 120 MG Oral Capsule; Take 1 capsule twice daily; Therapy: (Recorded:20Vqa7603) to Recorded   18  Glucosamine 1500 Complex Oral Capsule; TAKE 2 CAPSULE Twice daily; Therapy: (Recorded:86Clr4757) to Recorded   19  Lactaid 4500 UNIT Oral Tablet Chewable; CHEW AND SWALLOW 1 TO 3 TABLETS    WITH FIRST BITE OF MILK FOOD; Therapy: (Recorded:88Mzy8623) to Recorded   20  L-Lysine 500 MG Oral Tablet; 1 tab daily; Therapy: 20DZZ0900 to  Requested for: 40WCQ5433 Recorded   21  Lyrica 150 MG Oral Capsule; TAKE 1 CAPSULE 3 TIMES DAILY; Therapy: 64QAN5517 to (Evaluate:21Jun2016); Last Rx:23Mar2016 Ordered   22  Lyrica 150 MG Oral Capsule; TAKE 1 CAPSULE TWICE DAILY; Therapy: 96QZN6263 to (Evaluate:19Apr2016); Last Rx:20Jan2016 Ordered   23  Magnesium 500 MG Oral Capsule; take 1 capsule daily; Therapy: (Recorded:46Pil5064) to Recorded   24  Nitrostat 0 4 MG Sublingual Tablet Sublingual; DISSOLVE 1 TABLET UNDER THE    TONGUE AS NEEDED FOR CHEST PAIN;    Therapy: 86CCL6351 to (Evaluate:79Uxk7435)  Requested for: 54Rbq1607; Last    Rx:73Mhk4065 Ordered   25  Oxycodone-Acetaminophen 5-325 MG Oral Tablet; TAKE 1 TABLET EVERY 12 HOURS    AS NEEDED FOR PAIN;    Therapy: 92Twd9622 to (Evaluate:63Xvq7723); Last Rx:37Krj0209 Ordered   26  Oxycodone-Acetaminophen 5-325 MG Oral Tablet; TAKE 1 TABLET EVERY 6 HOURS    AS NEEDED FOR PAIN;    Therapy: 52QFV4511 to (Evaluate:40Osk4975); Last Rx:08Hwh1640 Ordered   27  ProAir  (90 Base) MCG/ACT Inhalation Aerosol Solution; 2 puffs Q4 hours prn    SOB/wheeze; Therapy: 91UNO5490 to (Last Rx:23Wca7968)  Requested for: 27GOH8241 Ordered   28  Selenium  MCG Oral Tablet Extended Release; 1 tab daily; Therapy: 14BDE7067 to  Requested for: 73BFE3291 Recorded   29  Simvastatin 20 MG Oral Tablet (Zocor); 1 TAB DAILY; Therapy: 57MNJ3096 to (Last QZ:25ESY1604)  Requested for: 02Jun2015 Ordered   30   Super B Complex-100 TABS; TAKE 1 TABLET DAILY AS DIRECTED; Therapy: (Recorded:08Apr2014) to Recorded   31  TraMADol HCl - 50 MG Oral Tablet; TAKE 1 TABLET 4 TIMES DAILY AS NEEDED FOR    PAIN; Last Rx:24Mar2016 Ordered   32  Pam Leisa Pressair 400 MCG/ACT Inhalation Aerosol Powder Breath Activated; INHALE 1    PUFFS Daily; Therapy: 12KNI7650 to (Last Rx:04Ueb7230) Ordered   33  Vitamin C 500 MG Oral Tablet; TAKE 1 TABLET DAILY; Therapy: (Recorded:21Apr2014) to Recorded   34  Vitamin D 1000 UNIT Oral Tablet; TAKE 1 TABLET DAILY; Therapy: (Recorded:08Apr2014) to Recorded   35  Vitamin E 400 UNIT Oral Capsule; 1 tab daily; Therapy: 29YNQ3903 to  Requested for: 42HMB1478 Recorded   36  Xarelto 20 MG Oral Tablet; One a day; Therapy: 94VCK9028 to (Last Rx:75Sns1645) Ordered   37  Zinc 50 MG Oral Tablet; TAKE 1 TABLET DAILY; Therapy: (Recorded:08Apr2014) to Recorded    Allergies    1   No Known Drug Allergies    Signatures   Electronically signed by : Mckenzie Oliver MA; Mar 24 2016  2:56PM EST                       (Author)

## 2018-01-13 NOTE — MISCELLANEOUS
Message   Recorded as Task   Date: 03/24/2016 01:57 PM, Created By: Merrianne Koyanagi   Task Name: Follow Up   Assigned To: Danae Perez   Regarding Patient: Starr Garcia, Status: Active   Comment:    Danae Perez - 24 Mar 2016 1:57 PM     TASK CREATED  Patient states that "he feel great"  He reports that that he has received 90% improvement from the procedure and his pain level is a 1 today  S/P L4-L5 LESI done 3/17/16 by Dr Yi Mcgrath  [employer] [employer] - 24 Mar 2016 2:13 PM     TASK REPLIED TO: Previously Assigned To [place]  Thanks  Active Problems    1  Acquired ankle/foot deformity (736 70) (M21 969)   2  Acute gout (274 01) (M10 9)   3  Acute gouty arthritis (274 01) (M10 00)   4  Allergic rhinitis (477 9) (J30 9)   5  Aneurysm of abdominal aorta (441 4) (I71 4)   6  Aneurysm of abdominal aorta (441 4) (I71 4)   7  Angina pectoris (413 9) (I20 9)   8  Arteriosclerosis of coronary artery (414 00) (I25 10)   9  Arthralgia of ankle or foot (719 47) (M25 579)   10  Atherosclerosis of arteries of extremities (440 20) (I70 209)   11  Atrial flutter (427 32) (I48 92)   12  Benign essential hypertension (401 1) (I10)   13  Benign hypertensive CKD (403 10) (I12 9)   14  Benign Localized Prostatic Hyperplasia Without Urinary Obstruction With Other Lower    Urinary Tract Symptoms (600 20)   15  Benign prostatic hypertrophy (600 00) (N40 0)   16  Bilateral lumbar radiculopathy (724 4) (M54 16)   17  Blurred vision (368 8) (H53 8)   18  Callus (700) (L84)   19  Carpal tunnel syndrome, unspecified laterality (354 0) (G56 00)   20  Cellulitis (682 9) (L03 90)   21  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)   22  Chronic kidney disease, stage 3 (585 3) (N18 3)   23  Chronic obstructive pulmonary disease (496) (J44 9)   24  Chronic obstructive pulmonary disease (496) (J44 9)   25  Chronic pain syndrome (338 4) (G89 4)   26  Colon cancer screening (V76 51) (Z12 11)   27   Colon, diverticulosis (562 10) (K57 30)   28  Congestive heart failure (428 0) (I50 9)   29  Coronary artery disease (414 00) (I25 10)   30  Deep venous insufficiency (459 81) (I87 2)   31  Degenerative disc disease, lumbar (722 52) (M51 36)   32  Difficulty in walking (719 7) (R26 2)   33  Fecal soiling (787 62) (R15 1)   34  Fibromyalgia (729 1) (M79 7)   35  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 3)   36  Gastrointestinal symptoms (787 99) (R19 8)   37  Gout (274 9) (M10 9)   38  Hyperkalemia (276 7) (E87 5)   39  Hyperlipidemia (272 4) (E78 5)   40  Limb pain (729 5) (M79 609)   41  Lumbar canal stenosis (724 02) (M48 06)   42  Lumbar neuritis (724 4) (M54 16)   43  Memory Lapses Or Loss (780 93)   44  Microscopic hematuria (599 72) (R31 2)   45  Moderate or severe vision impairment, one eye (369 60) (H54 40)   46  Morbid or severe obesity due to excess calories (278 01) (E66 01)   47  Morbid or severe obesity due to excess calories (278 01) (E66 01)   48  Needs flu shot (V04 81) (Z23)   49  Nicotine dependence (305 1) (F17 200)   50  Olecranon bursitis (726 33) (M70 20)   51  Onychogryphosis (703 8)   52  Onychomycosis (110 1) (B35 1)   53  Osteoarthritis, knee/lower leg (715 96) (M17 9)   54  Pain in joints of both feet (719 47) (M79 671,M79 672)   55  Paronychia of toe, unspecified laterality (681 11) (L03 039)   56  Permanent Pacemaker Type Dual-Chamber   57  Pes planus, congenital (754 61) (Q66 50)   58  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)   59  Presence of cardiac pacemaker (V45 01) (Z95 0)   60  Proteinuria (791 0) (R80 9)   61  Radiculopathy (729 2) (M54 10)   62  Screening for genitourinary condition (V81 6) (Z13 89)   63  Sleep apnea (780 57) (G47 30)   64  Sleep apnea (780 57) (G47 30)   65  Tendonitis (726 90) (M77 9)   66  Urinary incontinence (788 30) (R32)   67  Urinary tract infection (599 0) (N39 0)   68  Venous insufficiency (chronic) (peripheral) (459 81) (I87 2)    Current Meds   1   Allopurinol 100 MG Oral Tablet; TAKE 1 TABLET DAILY FOR GOUT;   Therapy: 22IGF6676 to (Last Rx:36Bih2389)  Requested for: 44Kpi6431 Ordered   2  B Complex Oral Capsule; TAKE 1 CAPSULE DAILY; Therapy: (Recorded:21Apr2014) to Recorded   3  Biotin 1000 MCG Oral Tablet; Take 1 tablet daily; Therapy: (Recorded:08Apr2014) to Recorded   4  Calcium-Carb 600 600 MG Oral Tablet; 1 tab daily; Therapy: 20PRZ8059 to  Requested for: 28QSY5653 Recorded   5  Centrum Silver Ultra Mens Oral Tablet; 1 tab daily; Therapy: 54SOH9783 to  Requested for: 15FRC8967 Recorded   6  Cinnamon 500 MG Oral Capsule; Take 1 capsule twice daily; Therapy: (Recorded:08Apr2014) to Recorded   7  Clopidogrel Bisulfate 75 MG Oral Tablet (Plavix); take 1 tablet by mouth once daily; Therapy: 93GZK2117 to (Evaluate:16Oct2016)  Requested for: 56FGD9866; Last   Rx:59Dhc1167 Ordered   8  Colcrys 0 6 MG Oral Tablet; TAKE 1 TABLET DAILY AS DIRECTED; Therapy: 94EWC7184 to (Evaluate:21Jun2016); Last Rx:24Mar2016 Ordered   9  Colcrys 0 6 MG Oral Tablet; TAKE 1 TABLET DAILY AS DIRECTED; Therapy: 21QLJ7674 to (Evaluate:17Dec2015)  Requested for: 04ZRM1034; Last   Rx:31Fmg5718 Ordered   10  Colcrys 0 6 MG Oral Tablet; USE AS DIRECTED; Therapy: 69Xjb4612 to (Evaluate:23Jan2016)  Requested for: 20Jan2016; Last    Rx:20Jan2016 Ordered   11  Cranberry Fruit Concentrate 92669 MG Oral Capsule; take 1 capsule daily; Therapy: (Recorded:89Rvo3814) to Recorded   12  [employer] Fish Oil CAPS; TAKE 1 CAPSULE Daily Recorded   13  Dulera 200-5 MCG/ACT Inhalation Aerosol; INHALE 2 PUFFS TWICE DAILY; Therapy: 78IBE3729 to (Last Rx:19Nov2013) Ordered   14  Felodipine ER 5 MG Oral Tablet Extended Release 24 Hour; take 1 tablet once daily     Requested for: 20UKI6985; Last Rx:24Mar2016 Ordered   15  Flax Seed Oil 1000 MG Oral Capsule; 2 tabs daily; Therapy: 30ZTQ5567 to  Requested for: 36ZZX7405 Recorded   16  Furosemide 40 MG Oral Tablet; TAKE 1 TABLET DAILY AS DIRECTED;     Therapy: (Recorded:08Apr2014) to Recorded   17  Ginkgo Biloba 120 MG Oral Capsule; Take 1 capsule twice daily; Therapy: (Recorded:11Wel6741) to Recorded   18  Glucosamine 1500 Complex Oral Capsule; TAKE 2 CAPSULE Twice daily; Therapy: (Recorded:19Dij0486) to Recorded   19  Lactaid 4500 UNIT Oral Tablet Chewable; CHEW AND SWALLOW 1 TO 3 TABLETS    WITH FIRST BITE OF MILK FOOD; Therapy: (Recorded:11Qdd9803) to Recorded   20  L-Lysine 500 MG Oral Tablet; 1 tab daily; Therapy: 68CTW8859 to  Requested for: 22QFK2291 Recorded   21  Lyrica 150 MG Oral Capsule; TAKE 1 CAPSULE 3 TIMES DAILY; Therapy: 00WHE8868 to (Evaluate:21Jun2016); Last Rx:23Mar2016 Ordered   22  Lyrica 150 MG Oral Capsule; TAKE 1 CAPSULE TWICE DAILY; Therapy: 95XYE3679 to (Evaluate:19Apr2016); Last Rx:20Jan2016 Ordered   23  Magnesium 500 MG Oral Capsule; take 1 capsule daily; Therapy: (Recorded:21Apr2014) to Recorded   24  Nitrostat 0 4 MG Sublingual Tablet Sublingual; DISSOLVE 1 TABLET UNDER THE    TONGUE AS NEEDED FOR CHEST PAIN;    Therapy: 27GWP0732 to (Evaluate:14Fmw1231)  Requested for: 37Qgm3207; Last    Rx:73Ezm2283 Ordered   25  Oxycodone-Acetaminophen 5-325 MG Oral Tablet; TAKE 1 TABLET EVERY 12 HOURS    AS NEEDED FOR PAIN;    Therapy: 40Vbz9977 to (Evaluate:67Ywk1524); Last Rx:06Nzp1595 Ordered   26  Oxycodone-Acetaminophen 5-325 MG Oral Tablet; TAKE 1 TABLET EVERY 6 HOURS    AS NEEDED FOR PAIN;    Therapy: 57ZKJ4409 to (Evaluate:02Ibk5901); Last Rx:80Yjp7155 Ordered   27  ProAir  (90 Base) MCG/ACT Inhalation Aerosol Solution; 2 puffs Q4 hours prn    SOB/wheeze; Therapy: 85NEK9188 to (Last Rx:34Yci1977)  Requested for: 16CVP9392 Ordered   28  Selenium  MCG Oral Tablet Extended Release; 1 tab daily; Therapy: 18BGP5455 to  Requested for: 59SDS7572 Recorded   29  Simvastatin 20 MG Oral Tablet (Zocor); 1 TAB DAILY; Therapy: 75LCV7284 to (Last YB:56GIN8499)  Requested for: 02Jun2015 Ordered   30   Super B Complex-100 TABS; TAKE 1 TABLET DAILY AS DIRECTED; Therapy: (Recorded:08Apr2014) to Recorded   31  TraMADol HCl - 50 MG Oral Tablet; TAKE 1 TABLET 4 TIMES DAILY AS NEEDED FOR    PAIN; Last Rx:24Mar2016 Ordered   32  Keila Specking Pressair 400 MCG/ACT Inhalation Aerosol Powder Breath Activated; INHALE 1    PUFFS Daily; Therapy: 73TPE0700 to (Last Rx:45Aiy3218) Ordered   33  Vitamin C 500 MG Oral Tablet; TAKE 1 TABLET DAILY; Therapy: (Recorded:21Apr2014) to Recorded   34  Vitamin D 1000 UNIT Oral Tablet; TAKE 1 TABLET DAILY; Therapy: (Recorded:08Apr2014) to Recorded   35  Vitamin E 400 UNIT Oral Capsule; 1 tab daily; Therapy: 52BJC2911 to  Requested for: 41ZLQ0453 Recorded   36  Xarelto 20 MG Oral Tablet; One a day; Therapy: 09VSG9723 to (Last Rx:83Gmv1844) Ordered   37  Zinc 50 MG Oral Tablet; TAKE 1 TABLET DAILY; Therapy: (Recorded:08Apr2014) to Recorded    Allergies    1   No Known Drug Allergies    Signatures   Electronically signed by : Malachi Mckenzie MA; Mar 24 2016  2:57PM EST                       (Author)

## 2018-01-14 VITALS
BODY MASS INDEX: 42.66 KG/M2 | RESPIRATION RATE: 18 BRPM | HEART RATE: 87 BPM | SYSTOLIC BLOOD PRESSURE: 148 MMHG | WEIGHT: 315 LBS | DIASTOLIC BLOOD PRESSURE: 92 MMHG | HEIGHT: 72 IN

## 2018-01-14 VITALS
SYSTOLIC BLOOD PRESSURE: 136 MMHG | HEART RATE: 86 BPM | DIASTOLIC BLOOD PRESSURE: 84 MMHG | RESPIRATION RATE: 20 BRPM | HEIGHT: 72 IN | WEIGHT: 315 LBS | BODY MASS INDEX: 42.66 KG/M2

## 2018-01-14 VITALS
TEMPERATURE: 98.4 F | HEIGHT: 72 IN | DIASTOLIC BLOOD PRESSURE: 80 MMHG | HEART RATE: 82 BPM | SYSTOLIC BLOOD PRESSURE: 138 MMHG | RESPIRATION RATE: 20 BRPM

## 2018-01-14 NOTE — MISCELLANEOUS
Message  Message Free Text Note Form: wife called at 7pm  she states he cannot sleep, has fluid in the lungs, SOB  pt then reports he does not have SOB? just trouble sleeping, last 2 nights  very frustrated, never had before  using CPAP for JOVI  he feels very frustrated that he cannot sleep, does not thinks its due to SOB  I advised this does not sound like an emergency but if he feels his insomnia is an emergency, he can choose to go to the ER  I advised he consider otc options, benadryl or zzquill  they say he has a pacemaker, I asked to confirm with their cardiologist  stronger rx won't be called in, sedatives are not recommended with JOVI  he will try to contact Dr Claude Paddock on Monday for other suggestions      Signatures   Electronically signed by : Edi Odom DO; Jun 4 2016  9:43PM EST                       (Author)    Electronically signed by : Edi Odom DO; Jun 4 2016  9:45PM EST                       (Author)

## 2018-01-15 NOTE — MISCELLANEOUS
Message   Recorded as Task   Date: 01/10/2017 04:39 PM, Created By: Steffi Hampton   Task Name: Miscellaneous   Assigned To: Ericka Lesch   Regarding Patient: Florentino Ferraro, Status: In Progress   Comment:    Steffi Hampton - 10 Satinder 2017 4:39 PM     TASK CREATED  if no significant edema, please hold lasix x 3 doses  Please make sure Ray not taking colcrys everyday  BMP in 2 weeks   Jazmyne Trotter - 11 Jan 2017 8:47 AM     TASK IN PROGRESS   Pt denies having any significant edema therefor was advised to HOLD Furosemide x3 doses  Denise Lentz states he was not taken colcrys in months and only takes it if he is having a gout attack  A BMP has been ordered for 1/25  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)   10  Arteriosclerosis of coronary artery (414 00) (I25 10)   11  Arthralgia of ankle or foot (719 47) (M25 579)   12  Atherosclerosis of arteries of extremities (440 20) (I70 209)   13  Atrial flutter (427 32) (I48 92)   14  Benign essential hypertension (401 1) (I10)   15  Benign hypertensive CKD (403 10) (I12 9)   16  Benign Localized Prostatic Hyperplasia Without Urinary Obstruction With Other Lower    Urinary Tract Symptoms (600 20)   17  Benign prostatic hypertrophy (600 00) (N40 0)   18  Bilateral edema of lower extremity (782 3) (R60 0)   19  Bilateral lumbar radiculopathy (724 4) (M54 16)   20  Blurred vision (368 8) (H53 8)   21  Body mass index (BMI) of 45 0 to 49 9 in adult (V85 42) (Z68 42)   22  Callus (700) (L84)   23  Carpal tunnel syndrome, unspecified laterality (354 0) (G56 00)   24   Cellulitis (682 9) (L03 90)   25  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)   26  Chronic kidney disease, stage 3 (585 3) (N18 3)   27  Chronic low back pain (724 2,338 29) (M54 5,G89 29)   28  Chronic obstructive pulmonary disease (496) (J44 9)   29  Chronic obstructive pulmonary disease (496) (J44 9)   30  Chronic pain syndrome (338 4) (G89 4)   31  Colon cancer screening (V76 51) (Z12 11)   32  Colon, diverticulosis (562 10) (K57 30)   33  Congestive heart failure (428 0) (I50 9)   34  Coronary artery disease (414 00) (I25 10)   35  Deep venous insufficiency (459 81) (I87 2)   36  Degenerative disc disease, lumbar (722 52) (M51 36)   37  Difficulty in walking (719 7) (R26 2)   38  Fecal soiling (787 62) (R15 1)   39  Fibromyalgia (729 1) (M79 7)   40  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 3)   41  Gastrointestinal symptoms (787 99) (R19 8)   42  Gout (274 9) (M10 9)   43  Hyperkalemia (276 7) (E87 5)   44  Hyperlipidemia (272 4) (E78 5)   45  Insomnia, persistent (307 42) (G47 00)   46  Lumbar canal stenosis (724 02) (M48 06)   47  Lumbar neuritis (724 4) (M54 16)   48  Memory Lapses Or Loss (780 93)   49  Microscopic hematuria (599 72) (R31 29)   50  Moderate or severe vision impairment, one eye (369 60) (H54 40)   51  Morbid or severe obesity due to excess calories (278 01) (E66 01)   52  Morbid or severe obesity due to excess calories (278 01) (E66 01)   53  Needs flu shot (V04 81) (Z23)   54  Nicotine dependence (305 1) (F17 200)   55  Olecranon bursitis (726 33) (M70 20)   56  Onychogryphosis (703 8)   57  Onychomycosis (110 1) (B35 1)   58  Osteoarthritis, knee/lower leg (715 96) (M17 9)   59  Pain in joints of both feet (719 47) (M79 671,M79 672)   60  Paronychia of toe, unspecified laterality (681 11) (L03 039)   61  Permanent Pacemaker Type Dual-Chamber   62  Pes planus, congenital (754 61) (Q66 50)   63  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)   64  Presence of cardiac pacemaker (V45 01) (Z95 0)   65   Proteinuria (791 0) (R80 9)   66  Radiculopathy (729 2) (M54 10)   67  Screening for genitourinary condition (V81 6) (Z13 89)   68  Skin tear of right lower leg without complication, initial encounter (891 0) (S81 801A)   69  Sleep apnea (780 57) (G47 30)   70  Sleep apnea (780 57) (G47 30)   71  Tenderness in limb (729 5) (M79 609)   72  Tendonitis (726 90) (M77 9)   73  Urinary incontinence (788 30) (R32)   74  Urinary tract infection (599 0) (N39 0)   75  Venous insufficiency (chronic) (peripheral) (459 81) (I87 2)    Current Meds   1  Allopurinol 100 MG Oral Tablet; TAKE 1 TABLET DAILY FOR  GOUT;   Therapy: 84IVA2848 to (Evaluate:01Urr2566)  Requested for: 42HNZ9758; Last   Rx:08Nov2016 Ordered   2  B Complex Oral Capsule; TAKE 1 CAPSULE DAILY; Therapy: (Recorded:70Prd4407) to Recorded   3  Biotin 1000 MCG Oral Tablet; Take 1 tablet daily; Therapy: (Recorded:82Qyn3998) to Recorded   4  Calcium-Carb 600 600 MG Oral Tablet; 1 tab daily; Therapy: 74SLI6958 to  Requested for: 49MTK7632 Recorded   5  Centrum Silver Ultra Mens Oral Tablet; 1 tab daily; Therapy: 96DMY8014 to  Requested for: 26DXG0475 Recorded   6  Cinnamon 500 MG Oral Capsule; Take 1 capsule twice daily; Therapy: (Recorded:94Att6180) to Recorded   7  ClonazePAM 1 MG Oral Tablet Dispersible; PLACE 1 TABLET ON TONGUE AND ALLOW   TO DISSOLVE TWICE DAILY AS NEEDED; Therapy: 96XRM1128 to (Evaluate:90Xfh2580); Last Rx:59Yhw7319 Ordered   8  Clopidogrel Bisulfate 75 MG Oral Tablet (Plavix); TAKE 1 TABLET BY MOUTH ONCE A   DAY; Therapy: 25DRP0997 to (Evaluate:64Pze8021)  Requested for: 40Usi0030; Last   Rx:14Fhg8776 Ordered   9  Colcrys 0 6 MG Oral Tablet (Colchicine); TAKE 1 TABLET DAILY AS DIRECTED; Therapy: 94LTI4392 to (Evaluate:21Jun2016); Last Rx:24Mar2016 Ordered   10  Cranberry Fruit Concentrate 62887 MG Oral Capsule; Take 1 capsule twice daily    Recorded   11  [employer] Fish Oil CAPS; TAKE 1 CAPSULE Daily Recorded   12   Dulera 200-5 MCG/ACT Inhalation Aerosol; INHALE 2 PUFFS TWICE DAILY; Therapy: 03CXH3085 to (Last Rx:19Nov2013) Ordered   13  Felodipine ER 5 MG Oral Tablet Extended Release 24 Hour; take 1 tablet by mouth once    daily; Therapy: 14YEI1750 to (Evaluate:11Jun2017)  Requested for: 46Ycr1745; Last    Rx:06Onq9493 Ordered   14  Finasteride 5 MG Oral Tablet (Proscar); 1 DAILY; Therapy: 08Xpd6211 to (Last Rx:59Sem4610)  Requested for: 82Eee4229 Ordered   15  Flax Seed Oil 1000 MG Oral Capsule; 2 tabs daily; Therapy: 61NOA5808 to  Requested for: 38BTT3542 Recorded   16  Furosemide 40 MG Oral Tablet; TAKE 1 TABLET DAILY AS DIRECTED; Therapy: (Recorded:08Apr2014) to Recorded   17  Ginkgo Biloba 120 MG Oral Capsule; Take 1 capsule twice daily; Therapy: (Recorded:51Mmh5082) to Recorded   18  Glucosamine 1500 Complex Oral Capsule; TAKE 2 CAPSULE Twice daily; Therapy: (Recorded:08Apr2014) to Recorded   19  Lactaid 4500 UNIT CHEW; CHEW AND SWALLOW 1 TO 3 TABLETS WITH FIRST BITE    OF MILK FOOD; Therapy: (Recorded:44Kkz7059) to Recorded   20  L-Lysine 500 MG Oral Tablet; 1 tab daily; Therapy: 71NKE9192 to  Requested for: 23YVZ4044 Recorded   21  Lyrica 150 MG Oral Capsule; TAKE 1 CAPSULE 3 TIMES DAILY; Therapy: 27TSG2976 to (Evaluate:21Jun2016); Last Rx:23Mar2016 Ordered   22  Lyrica 75 MG Oral Capsule; Take 1 capsule twice daily; Therapy: 45Ept4522 to (Evaluate:12Oct2016); Last Rx:86Rqq6549 Ordered   23  Magnesium 500 MG Oral Capsule; take 1 capsule daily; Therapy: (Recorded:42Syn4037) to Recorded   24  Nitrostat 0 4 MG Sublingual Tablet Sublingual (Nitroglycerin); DISSOLVE 1 TABLET    UNDER THE TONGUE AS NEEDED FOR CHEST PAIN;    Therapy: 32CLG4526 to (Evaluate:29Oct2015)  Requested for: 93Ybk0682; Last    Rx:64Vpn1424 Ordered   25  PARoxetine HCl - 20 MG Oral Tablet; 1 EVERY MORNING; Therapy: 29FQX6338 to (Last Rx:61Qfx3661)  Requested for: 08Tqd4157 Ordered   26   ProAir  (90 Base) MCG/ACT Inhalation Aerosol Solution; 2 puffs Q4 hours prn    SOB/wheeze; Therapy: 69KKG9215 to (Last Rx:52Mza8123)  Requested for: 29AUV0467 Ordered   27  Selenium  MCG Oral Tablet Extended Release; 1 tab daily; Therapy: 81ZII7859 to  Requested for: 95POD0770 Recorded   28  Silver Sulfadiazine 1 % External Cream; APPLY TO AFFECTED AREA TWICE DAILY AS    DIRECTED; Therapy: 35AVI9712 to (Last Rx:06Apr2016)  Requested for: 06Apr2016 Ordered   29  Simvastatin 20 MG Oral Tablet (Zocor); 1 TAB DAILY; Therapy: 82AZI5267 to (Last Rx:80Ugw3914)  Requested for: 62Ytd6840 Ordered   30  Super B Complex-100 TABS; TAKE 1 TABLET DAILY AS DIRECTED; Therapy: (Recorded:08Apr2014) to Recorded   31  TraMADol HCl - 50 MG Oral Tablet; TAKE 1 TABLET 4 TIMES DAILY AS NEEDED FOR    PAIN; Last Rx:15Wzu4746 Ordered   32  [place] Pressair 400 MCG/ACT Inhalation Aerosol Powder Breath Activated; INHALE 1    PUFFS Daily; Therapy: 25CVK1414 to (Last Rx:10Ybc8429) Ordered   33  VESIcare 5 MG Oral Tablet; Take 1 tablet daily Recorded   34  Vitamin C 500 MG Oral Tablet; TAKE 1 TABLET DAILY; Therapy: (Recorded:21Apr2014) to Recorded   35  Vitamin D 1000 UNIT Oral Tablet; TAKE 1 TABLET DAILY; Therapy: (Recorded:08Apr2014) to Recorded   36  Vitamin E 400 UNIT Oral Capsule; 1 tab daily; Therapy: 10QPX2947 to  Requested for: 60CHP6178 Recorded   37  Xarelto 20 MG Oral Tablet; One a day; Therapy: 86MEU8996 to (Last Rx:05Yrg1670) Ordered   38  Zinc 50 MG Oral Tablet; TAKE 1 TABLET DAILY; Therapy: (Recorded:08Apr2014) to Recorded    Allergies    1  No Known Drug Allergies    Plan  Acute gouty arthritis, Chronic kidney disease, stage 3    · (1) BASIC METABOLIC PROFILE; Status:Active - Retrospective By Protocol  Authorization;  Requested IYD:[id number];     Signatures   Electronically signed by : NICK Tam ; Jan 11 2017  7:13PM EST

## 2018-01-16 NOTE — RESULT NOTES
Verified Results  *VB - Urinary Incontinence Screen (Dx Z13 89 Screen for UI) 78Eja8979 01:36PM Bria Cade     Test Name Result Flag Reference   Urinary Incontinence Assessment 77Wzv6879

## 2018-01-16 NOTE — MISCELLANEOUS
Message   Recorded as Task   Date: 06/02/2016 01:43 PM, Created By: Delisa Buckley   Task Name: Follow Up   Assigned To: [address] [facility], [address] [facility] Procedure,Team   Regarding Patient: Chandrakant Abrams, Status: Active   Comment:    Danae Perez - 02 Jun 2016 1:43 PM     TASK CREATED  Patient reports he received 80% relief from his procedure  Patient states he is very happy with results of the procedure  He has zero pain today  S/p L4-L5 LESI--XARELTO PLAVIX done 5/26/16 by Dr Lesa Dietrich Jun 2016 4:33 PM     TASK REPLIED TO: Previously Assigned To [place]  [place]  Active Problems    1  Abrasion (919 0) (T14 8)   2  Acquired ankle/foot deformity (736 70) (M21 969)   3  Acute gout (274 01) (M10 9)   4  Acute gouty arthritis (274 01) (M10 00)   5  Allergic rhinitis (477 9) (J30 9)   6  Aneurysm of abdominal aorta (441 4) (I71 4)   7  Aneurysm of abdominal aorta (441 4) (I71 4)   8  Angina pectoris (413 9) (I20 9)   9  Arteriosclerosis of coronary artery (414 00) (I25 10)   10  Arthralgia of ankle or foot (719 47) (M25 579)   11  Atherosclerosis of arteries of extremities (440 20) (I70 209)   12  Atrial flutter (427 32) (I48 92)   13  Benign essential hypertension (401 1) (I10)   14  Benign hypertensive CKD (403 10) (I12 9)   15  Benign Localized Prostatic Hyperplasia Without Urinary Obstruction With Other Lower    Urinary Tract Symptoms (600 20)   16  Benign prostatic hypertrophy (600 00) (N40 0)   17  Bilateral edema of lower extremity (782 3) (R60 0)   18  Bilateral lumbar radiculopathy (724 4) (M54 16)   19  Blurred vision (368 8) (H53 8)   20  Body mass index (BMI) of 45 0 to 49 9 in adult (V85 42) (Z68 42)   21  Callus (700) (L84)   22  Carpal tunnel syndrome, unspecified laterality (354 0) (G56 00)   23  Cellulitis (682 9) (L03 90)   24  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)   25  Chronic kidney disease, stage 3 (585 3) (N18 3)   26   Chronic obstructive pulmonary disease (496) (J44 9)   27  Chronic obstructive pulmonary disease (496) (J44 9)   28  Chronic pain syndrome (338 4) (G89 4)   29  Colon cancer screening (V76 51) (Z12 11)   30  Colon, diverticulosis (562 10) (K57 30)   31  Congestive heart failure (428 0) (I50 9)   32  Coronary artery disease (414 00) (I25 10)   33  Deep venous insufficiency (459 81) (I87 2)   34  Degenerative disc disease, lumbar (722 52) (M51 36)   35  Difficulty in walking (719 7) (R26 2)   36  Fecal soiling (787 62) (R15 1)   37  Fibromyalgia (729 1) (M79 7)   38  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 3)   39  Gastrointestinal symptoms (787 99) (R19 8)   40  Gout (274 9) (M10 9)   41  Hyperkalemia (276 7) (E87 5)   42  Hyperlipidemia (272 4) (E78 5)   43  Lumbar canal stenosis (724 02) (M48 06)   44  Lumbar neuritis (724 4) (M54 16)   45  Memory Lapses Or Loss (780 93)   46  Microscopic hematuria (599 72) (R31 2)   47  Moderate or severe vision impairment, one eye (369 60) (H54 40)   48  Morbid or severe obesity due to excess calories (278 01) (E66 01)   49  Morbid or severe obesity due to excess calories (278 01) (E66 01)   50  Needs flu shot (V04 81) (Z23)   51  Nicotine dependence (305 1) (F17 200)   52  Olecranon bursitis (726 33) (M70 20)   53  Onychogryphosis (703 8)   54  Onychomycosis (110 1) (B35 1)   55  Osteoarthritis, knee/lower leg (715 96) (M17 9)   56  Pain in joints of both feet (719 47) (M79 671,M79 672)   57  Paronychia of toe, unspecified laterality (681 11) (L03 039)   58  Permanent Pacemaker Type Dual-Chamber   59  Pes planus, congenital (754 61) (Q66 50)   60  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)   61  Presence of cardiac pacemaker (V45 01) (Z95 0)   62  Proteinuria (791 0) (R80 9)   63  Radiculopathy (729 2) (M54 10)   64  Screening for genitourinary condition (V81 6) (Z13 89)   65  Skin tear of right lower leg without complication, initial encounter (891 0) (S81 801A)   66  Sleep apnea (780 57) (G47 30)   67   Sleep apnea (780 57) (G47 30)   68  Tenderness in limb (729 5) (M79 609)   69  Tendonitis (726 90) (M77 9)   70  Urinary incontinence (788 30) (R32)   71  Urinary tract infection (599 0) (N39 0)   72  Venous insufficiency (chronic) (peripheral) (459 81) (I87 2)    Current Meds   1  Allopurinol 100 MG Oral Tablet; TAKE 1 TABLET DAILY FOR GOUT;   Therapy: 82GEX2538 to (Last Rx:19Afd6734)  Requested for: 28Jda2875 Ordered   2  B Complex Oral Capsule; TAKE 1 CAPSULE DAILY; Therapy: (Recorded:20Bpf2846) to Recorded   3  Biotin 1000 MCG Oral Tablet; Take 1 tablet daily; Therapy: (Recorded:88Wrk9136) to Recorded   4  Calcium-Carb 600 600 MG Oral Tablet; 1 tab daily; Therapy: 09WZD4220 to  Requested for: 31ZVO6564 Recorded   5  Centrum Silver Ultra Mens Oral Tablet; 1 tab daily; Therapy: 00IXM8398 to  Requested for: 85MGL9881 Recorded   6  Cephalexin 250 MG Oral Tablet (Cephalexin Monohydrate); TAKE 1 TABLET EVERY 6   HOURS DAILY; Therapy: 73KCI9907 to (Evaluate:52Ejw0846)  Requested for: 52ZJH0870; Last   Rx:06Apr2016 Ordered   7  Cinnamon 500 MG Oral Capsule; Take 1 capsule twice daily; Therapy: (Recorded:86Wff5968) to Recorded   8  Clopidogrel Bisulfate 75 MG Oral Tablet (Plavix); take 1 tablet by mouth once daily; Therapy: 89SZP8065 to (Evaluate:16Oct2016)  Requested for: 75OTF6297; Last   Rx:22Oct2015 Ordered   9  Colcrys 0 6 MG Oral Tablet; TAKE 1 TABLET DAILY AS DIRECTED; Therapy: 42QZQ5312 to (Evaluate:21Jun2016); Last Rx:24Mar2016 Ordered   10  Cranberry Fruit Concentrate 81440 MG Oral Capsule; Take 1 capsule twice daily    Recorded   11  [employer] Fish Oil CAPS; TAKE 1 CAPSULE Daily Recorded   12  Dulera 200-5 MCG/ACT Inhalation Aerosol; INHALE 2 PUFFS TWICE DAILY; Therapy: 18HZJ0347 to (Last Rx:19Nov2013) Ordered   13  Felodipine ER 5 MG Oral Tablet Extended Release 24 Hour; take 1 tablet once daily     Requested for: 89AFE6251; Last Rx:24Mar2016 Ordered   14  Finasteride 5 MG Oral Tablet (Proscar); 1 DAILY;     Therapy: 54RCV6766 to (Last Rx:00Jbc2881)  Requested for: 70IIH7429 Ordered   15  Flax Seed Oil 1000 MG Oral Capsule; 2 tabs daily; Therapy: 17ZDS4299 to  Requested for: 26AZJ8335 Recorded   16  Furosemide 40 MG Oral Tablet; TAKE 1 TABLET DAILY AS DIRECTED; Therapy: (Recorded:08Apr2014) to Recorded   17  Ginkgo Biloba 120 MG Oral Capsule; Take 1 capsule twice daily; Therapy: (Recorded:21Apr2014) to Recorded   18  Glucosamine 1500 Complex Oral Capsule; TAKE 2 CAPSULE Twice daily; Therapy: (Recorded:08Apr2014) to Recorded   19  Lactaid 4500 UNIT Oral Tablet Chewable; CHEW AND SWALLOW 1 TO 3 TABLETS    WITH FIRST BITE OF MILK FOOD; Therapy: (Recorded:21Apr2014) to Recorded   20  L-Lysine 500 MG Oral Tablet; 1 tab daily; Therapy: 44JDQ9494 to  Requested for: 88MWN8657 Recorded   21  Lyrica 150 MG Oral Capsule; TAKE 1 CAPSULE 3 TIMES DAILY; Therapy: 35PKT1518 to (Evaluate:21Jun2016); Last Rx:23Mar2016 Ordered   22  Magnesium 500 MG Oral Capsule; take 1 capsule daily; Therapy: (Recorded:21Apr2014) to Recorded   23  Nitrostat 0 4 MG Sublingual Tablet Sublingual; DISSOLVE 1 TABLET UNDER THE    TONGUE AS NEEDED FOR CHEST PAIN;    Therapy: 19MHW9116 to (Evaluate:29Oct2015)  Requested for: 00Ruc6891; Last    Rx:47Zqf0521 Ordered   24  Oxycodone-Acetaminophen 5-325 MG Oral Tablet; TAKE 1 TABLET EVERY 6 HOURS    AS NEEDED FOR PAIN;    Therapy: 83EAA6911 to (Evaluate:68Pri2446); Last Rx:29Jkv7173 Ordered   25  ProAir  (90 Base) MCG/ACT Inhalation Aerosol Solution; 2 puffs Q4 hours prn    SOB/wheeze; Therapy: 82CXR7564 to (Last Rx:19Nov2013)  Requested for: 39RYL4967 Ordered   26  Selenium  MCG Oral Tablet Extended Release; 1 tab daily; Therapy: 80FQK4187 to  Requested for: 12KMW1041 Recorded   27  Silver Sulfadiazine 1 % External Cream; APPLY TO AFFECTED AREA TWICE DAILY AS    DIRECTED; Therapy: 30ZUR8261 to (Last Rx:06Apr2016)  Requested for: 82Tmz8034 Ordered   28   Simvastatin 20 MG Oral Tablet (Zocor); 1 TAB DAILY; Therapy: 35OPV1446 to (Last Rx:28Apr2016)  Requested for: 28Apr2016 Ordered   29  Super B Complex-100 TABS; TAKE 1 TABLET DAILY AS DIRECTED; Therapy: (Recorded:08Apr2014) to Recorded   30  TraMADol HCl - 50 MG Oral Tablet; TAKE 1 TABLET 4 TIMES DAILY AS NEEDED FOR    PAIN; Last Rx:54Upo9038 Ordered   31  Shilpi Fend Pressair 400 MCG/ACT Inhalation Aerosol Powder Breath Activated; INHALE 1    PUFFS Daily; Therapy: 06DXH8307 to (Last Rx:71Mqm7339) Ordered   32  VESIcare 5 MG Oral Tablet; Take 1 tablet daily Recorded   33  Vitamin C 500 MG Oral Tablet; TAKE 1 TABLET DAILY; Therapy: (Recorded:21Apr2014) to Recorded   34  Vitamin D 1000 UNIT Oral Tablet; TAKE 1 TABLET DAILY; Therapy: (Recorded:08Apr2014) to Recorded   35  Vitamin E 400 UNIT Oral Capsule; 1 tab daily; Therapy: 31OLO4150 to  Requested for: 70MSW8596 Recorded   36  Xarelto 20 MG Oral Tablet; One a day; Therapy: 20KAA2423 to (Last Rx:51Evi9989) Ordered   37  Zinc 50 MG Oral Tablet; TAKE 1 TABLET DAILY; Therapy: (Recorded:08Apr2014) to Recorded    Allergies    1   No Known Drug Allergies    Signatures   Electronically signed by : Sp Romero MA; Arsenio  3 2016  2:14PM EST                       (Author)

## 2018-01-17 NOTE — MISCELLANEOUS
Message   Recorded as Task   Date: 07/15/2016 11:22 AM, Created By: Jairo Khoury   Task Name: Miscellaneous   Assigned To: Merlene Turner   Regarding Patient: Court Short, Status: In Progress   Comment:    Neri Page - 15 Jul 2016 11:22 AM     TASK CREATED  need spot urine and urica lorie level before next visit next week    del TrotterLupeJazmyne - 15 Jul 2016 11:49 AM     TASK IN PROGRESS   Pt is aware and has slips, he will be going tomorrow morning to have them drawn  [facility] 7/15/2016      Active Problems    1  Abrasion (919 0) (T14 8)   2  Acquired ankle/foot deformity (736 70) (M21 969)   3  Acute gout (274 01) (M10 9)   4  Acute gouty arthritis (274 01) (M10 00)   5  Allergic rhinitis (477 9) (J30 9)   6  Aneurysm of abdominal aorta (441 4) (I71 4)   7  Aneurysm of abdominal aorta (441 4) (I71 4)   8  Angina pectoris (413 9) (I20 9)   9  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)   10  Arteriosclerosis of coronary artery (414 00) (I25 10)   11  Arthralgia of ankle or foot (719 47) (M25 579)   12  Atherosclerosis of arteries of extremities (440 20) (I70 209)   13  Atrial flutter (427 32) (I48 92)   14  Benign essential hypertension (401 1) (I10)   15  Benign hypertensive CKD (403 10) (I12 9)   16  Benign Localized Prostatic Hyperplasia Without Urinary Obstruction With Other Lower    Urinary Tract Symptoms (600 20)   17  Benign prostatic hypertrophy (600 00) (N40 0)   18  Bilateral edema of lower extremity (782 3) (R60 0)   19  Bilateral lumbar radiculopathy (724 4) (M54 16)   20  Blurred vision (368 8) (H53 8)   21  Body mass index (BMI) of 45 0 to 49 9 in adult (V85 42) (Z68 42)   22  Callus (700) (L84)   23  Carpal tunnel syndrome, unspecified laterality (354 0) (G56 00)   24  Cellulitis (682 9) (L03 90)   25  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)   26  Chronic kidney disease, stage 3 (585 3) (N18 3)   27  Chronic obstructive pulmonary disease (496) (J44 9)   28  Chronic obstructive pulmonary disease (496) (J44 9)   29  Chronic pain syndrome (338 4) (G89 4)   30  Colon cancer screening (V76 51) (Z12 11)   31  Colon, diverticulosis (562 10) (K57 30)   32  Congestive heart failure (428 0) (I50 9)   33  Coronary artery disease (414 00) (I25 10)   34  Deep venous insufficiency (459 81) (I87 2)   35  Degenerative disc disease, lumbar (722 52) (M51 36)   36  Difficulty in walking (719 7) (R26 2)   37  Fecal soiling (787 62) (R15 1)   38  Fibromyalgia (729 1) (M79 7)   39  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 3)   40  Gastrointestinal symptoms (787 99) (R19 8)   41  Gout (274 9) (M10 9)   42  Hyperkalemia (276 7) (E87 5)   43  Hyperlipidemia (272 4) (E78 5)   44  Lumbar canal stenosis (724 02) (M48 06)   45  Lumbar neuritis (724 4) (M54 16)   46  Memory Lapses Or Loss (780 93)   47  Microscopic hematuria (599 72) (R31 2)   48  Moderate or severe vision impairment, one eye (369 60) (H54 40)   49  Morbid or severe obesity due to excess calories (278 01) (E66 01)   50  Morbid or severe obesity due to excess calories (278 01) (E66 01)   51  Needs flu shot (V04 81) (Z23)   52  Nicotine dependence (305 1) (F17 200)   53  Olecranon bursitis (726 33) (M70 20)   54  Onychogryphosis (703 8)   55  Onychomycosis (110 1) (B35 1)   56  Osteoarthritis, knee/lower leg (715 96) (M17 9)   57  Pain in joints of both feet (719 47) (M79 671,M79 672)   58  Paronychia of toe, unspecified laterality (681 11) (L03 039)   59  Permanent Pacemaker Type Dual-Chamber   60  Pes planus, congenital (754 61) (Q66 50)   61  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)   62  Presence of cardiac pacemaker (V45 01) (Z95 0)   63  Proteinuria (791 0) (R80 9)   64  Radiculopathy (729 2) (M54 10)   65  Screening for genitourinary condition (V81 6) (Z13 89)   66  Skin tear of right lower leg without complication, initial encounter (891 0) (S81 801A)   67  Sleep apnea (780 57) (G47 30)   68  Sleep apnea (780 57) (G47 30)   69   Tenderness in limb (729 5) (M79 609)   70  Tendonitis (726 90) (M77 9)   71  Urinary incontinence (788 30) (R32)   72  Urinary tract infection (599 0) (N39 0)   73  Venous insufficiency (chronic) (peripheral) (459 81) (I87 2)    Current Meds   1  Allopurinol 100 MG Oral Tablet; TAKE 1 TABLET DAILY FOR GOUT;   Therapy: 98VKF3299 to (Last Rx:69Lgd1450)  Requested for: 85Hfd8110 Ordered   2  B Complex Oral Capsule; TAKE 1 CAPSULE DAILY; Therapy: (Recorded:21Apr2014) to Recorded   3  Biotin 1000 MCG Oral Tablet; Take 1 tablet daily; Therapy: (Recorded:08Apr2014) to Recorded   4  Calcium-Carb 600 600 MG Oral Tablet; 1 tab daily; Therapy: 62HSQ7659 to  Requested for: 31IIL7507 Recorded   5  Centrum Silver Ultra Mens Oral Tablet; 1 tab daily; Therapy: 14YHG0290 to  Requested for: 00QWT8880 Recorded   6  Cinnamon 500 MG Oral Capsule; Take 1 capsule twice daily; Therapy: (Recorded:08Apr2014) to Recorded   7  ClonazePAM 1 MG Oral Tablet Dispersible; PLACE 1 TABLET ON TONGUE AND ALLOW   TO DISSOLVE TWICE DAILY AS NEEDED; Therapy: 19JOC1865 to (Evaluate:09Lou3751); Last Rx:07Jun2016 Ordered   8  Clopidogrel Bisulfate 75 MG Oral Tablet (Plavix); TAKE 1 TABLET BY MOUTH ONCE A   DAY; Therapy: 10BAM4621 to (Evaluate:19Tvw0804)  Requested for: 35SSQ1505; Last   Rx:14Ovg1967 Ordered   9  Colcrys 0 6 MG Oral Tablet; TAKE 1 TABLET DAILY AS DIRECTED; Therapy: 30SPG7939 to (Evaluate:21Jun2016); Last Rx:24Mar2016 Ordered   10  Cranberry Fruit Concentrate 16296 MG Oral Capsule; Take 1 capsule twice daily    Recorded   11  [employer] Fish Oil CAPS; TAKE 1 CAPSULE Daily Recorded   12  Dulera 200-5 MCG/ACT Inhalation Aerosol; INHALE 2 PUFFS TWICE DAILY; Therapy: 37CVH7055 to (Last Rx:19Nov2013) Ordered   13  Felodipine ER 5 MG Oral Tablet Extended Release 24 Hour; take 1 tablet by mouth once    daily; Therapy: 71FWT8520 to (Evaluate:96Koh0914)  Requested for: 72DAY8618; Last    Rx:29Jun2016 Ordered   14   Finasteride 5 MG Oral Tablet (Proscar); 1 DAILY; Therapy: 77Qlf3841 to (Last Rx:92Vql3178)  Requested for: 89IJG0061 Ordered   15  Flax Seed Oil 1000 MG Oral Capsule; 2 tabs daily; Therapy: 27YGG6725 to  Requested for: 21WXR8248 Recorded   16  Furosemide 40 MG Oral Tablet; TAKE 1 TABLET DAILY AS DIRECTED; Therapy: (Recorded:51Bwc3000) to Recorded   17  Ginkgo Biloba 120 MG Oral Capsule; Take 1 capsule twice daily; Therapy: (Recorded:19Byz0683) to Recorded   18  Glucosamine 1500 Complex Oral Capsule; TAKE 2 CAPSULE Twice daily; Therapy: (Recorded:08Apr2014) to Recorded   19  Lactaid 4500 UNIT Oral Tablet Chewable; CHEW AND SWALLOW 1 TO 3 TABLETS    WITH FIRST BITE OF MILK FOOD; Therapy: (Recorded:21Apr2014) to Recorded   20  L-Lysine 500 MG Oral Tablet; 1 tab daily; Therapy: 36USC9362 to  Requested for: 87AOX5995 Recorded   21  Lyrica 150 MG Oral Capsule; TAKE 1 CAPSULE 3 TIMES DAILY; Therapy: 23OLJ3801 to (Evaluate:21Jun2016); Last Rx:23Mar2016 Ordered   22  Magnesium 500 MG Oral Capsule; take 1 capsule daily; Therapy: (Recorded:72Lwc9859) to Recorded   23  Nitrostat 0 4 MG Sublingual Tablet Sublingual; DISSOLVE 1 TABLET UNDER THE    TONGUE AS NEEDED FOR CHEST PAIN;    Therapy: 84STK4868 to (Evaluate:29Oct2015)  Requested for: 57Edg9759; Last    Rx:38Jwu6196 Ordered   24  PARoxetine HCl - 10 MG Oral Tablet; TAKE 0 5 TABLET Daily replace prior dose; Therapy: 40NXA4623 to (Evaluate:14Oct2016)  Requested for: 27HTQ5224; Last    Rx:16Jun2016 Ordered   25  ProAir  (90 Base) MCG/ACT Inhalation Aerosol Solution; 2 puffs Q4 hours prn    SOB/wheeze; Therapy: 89ODT1162 to (Last Rx:19Nov2013)  Requested for: 83TIW6317 Ordered   26  Selenium  MCG Oral Tablet Extended Release; 1 tab daily; Therapy: 21WOA1879 to  Requested for: 28BFL2374 Recorded   27  Silver Sulfadiazine 1 % External Cream; APPLY TO AFFECTED AREA TWICE DAILY AS    DIRECTED;     Therapy: 75DDX3580 to (Last Rx:06Apr2016)  Requested for: 54DXS2658 Ordered   28  Simvastatin 20 MG Oral Tablet (Zocor); 1 TAB DAILY; Therapy: 03KSB8530 to (Last Rx:28Apr2016)  Requested for: 28Apr2016 Ordered   29  Super B Complex-100 TABS; TAKE 1 TABLET DAILY AS DIRECTED; Therapy: (Recorded:08Apr2014) to Recorded   30  TraMADol HCl - 50 MG Oral Tablet; TAKE 1 TABLET 4 TIMES DAILY AS NEEDED FOR    PAIN; Last Rx:59Oee9695 Ordered   31  Hershell Roers Pressair 400 MCG/ACT Inhalation Aerosol Powder Breath Activated; INHALE 1    PUFFS Daily; Therapy: 06XUE6911 to (Last Rx:97Oif9387) Ordered   32  VESIcare 5 MG Oral Tablet; Take 1 tablet daily Recorded   33  Vitamin C 500 MG Oral Tablet; TAKE 1 TABLET DAILY; Therapy: (Recorded:21Apr2014) to Recorded   34  Vitamin D 1000 UNIT Oral Tablet; TAKE 1 TABLET DAILY; Therapy: (Recorded:08Apr2014) to Recorded   35  Vitamin E 400 UNIT Oral Capsule; 1 tab daily; Therapy: 33KQA8667 to  Requested for: 28UES8703 Recorded   36  Xarelto 20 MG Oral Tablet; One a day; Therapy: 09JMS3670 to (Last Rx:99Nqb4389) Ordered   37  Zinc 50 MG Oral Tablet; TAKE 1 TABLET DAILY; Therapy: (Recorded:08Apr2014) to Recorded    Allergies    1   No Known Drug Allergies    Signatures   Electronically signed by : NICK Drew ; Jul 15 2016  2:56PM EST

## 2018-01-17 NOTE — MISCELLANEOUS
Message   Recorded as Task   Date: 03/10/2016 01:37 PM, Created By: Marcela Mcgowan   Task Name: Miscellaneous   Assigned To: Billie Lakhani   Regarding Patient: Tammi Abrams, Status: Active   Comment:    Billie Lakhani - 10 Mar 2016 1:37 PM     TASK CREATED    Patient's wife called and states that the urologist would like to put him on Vesicare 5 mg once a day, and she would like to know if this is okay from Dr Clare Bernheim standpoint? Also, she wanted to know if it is safe for him to take Metamucil? Billie Lakhani - 10 Mar 2016 1:40 PM     TASK REASSIGNED: Previously Assigned To Suzanne Arreola - 11 Mar 2016 12:56 PM     TASK EDITED  yes and yes but vesicare can raise potassium so he needs to carefully restrict this  Order BMP 3 weeks after he starts vesicare   I spoke with the patient's wife and she is aware of the above  I mailed her a lab slip for the BMP in 3 weeks  kja      Active Problems    1  Acquired ankle/foot deformity (736 70) (M21 969)   2  Acute gout (274 01) (M10 9)   3  Acute gouty arthritis (274 01) (M10 00)   4  Allergic rhinitis (477 9) (J30 9)   5  Aneurysm of abdominal aorta (441 4) (I71 4)   6  Aneurysm of abdominal aorta (441 4) (I71 4)   7  Angina pectoris (413 9) (I20 9)   8  Arteriosclerosis of coronary artery (414 00) (I25 10)   9  Arthralgia of ankle or foot (719 47) (M25 579)   10  Atherosclerosis of arteries of extremities (440 20) (I70 209)   11  Atrial flutter (427 32) (I48 92)   12  Benign essential hypertension (401 1) (I10)   13  Benign hypertensive CKD (403 10) (I12 9)   14  Benign Localized Prostatic Hyperplasia Without Urinary Obstruction With Other Lower    Urinary Tract Symptoms (600 20)   15  Benign prostatic hypertrophy (600 00) (N40 0)   16  Bilateral lumbar radiculopathy (724 4) (M54 16)   17  Blurred vision (368 8) (H53 8)   18  Callus (700) (L84)   19  Carpal tunnel syndrome, unspecified laterality (354 0) (G56 00)   20  Cellulitis (682 9) (L03 90)   21  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)   22  Chronic kidney disease, stage 3 (585 3) (N18 3)   23  Chronic obstructive pulmonary disease (496) (J44 9)   24  Chronic obstructive pulmonary disease (496) (J44 9)   25  Chronic pain syndrome (338 4) (G89 4)   26  Colon cancer screening (V76 51) (Z12 11)   27  Colon, diverticulosis (562 10) (K57 30)   28  Congestive heart failure (428 0) (I50 9)   29  Coronary artery disease (414 00) (I25 10)   30  Deep venous insufficiency (459 81) (I87 2)   31  Degenerative disc disease, lumbar (722 52) (M51 36)   32  Difficulty in walking (719 7) (R26 2)   33  Fecal soiling (787 62) (R15 1)   34  Fibromyalgia (729 1) (M79 7)   35  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 3)   36  Gastrointestinal symptoms (787 99) (R19 8)   37  Gout (274 9) (M10 9)   38  Hyperkalemia (276 7) (E87 5)   39  Hyperlipidemia (272 4) (E78 5)   40  Limb pain (729 5) (M79 609)   41  Lumbar canal stenosis (724 02) (M48 06)   42  Lumbar neuritis (724 4) (M54 16)   43  Memory Lapses Or Loss (780 93)   44  Microscopic hematuria (599 72) (R31 2)   45  Moderate or severe vision impairment, one eye (369 60) (H54 40)   46  Morbid or severe obesity due to excess calories (278 01) (E66 01)   47  Morbid or severe obesity due to excess calories (278 01) (E66 01)   48  Needs flu shot (V04 81) (Z23)   49  Nicotine dependence (305 1) (F17 200)   50  Olecranon bursitis (726 33) (M70 20)   51  Onychogryphosis (703 8)   52  Onychomycosis (110 1) (B35 1)   53  Osteoarthritis, knee/lower leg (715 96) (M17 9)   54  Pain in joints of both feet (719 47) (M79 671,M79 672)   55  Paronychia of toe, unspecified laterality (681 11) (L03 039)   56  Permanent Pacemaker Type Dual-Chamber   57  Pes planus, congenital (754 61) (Q66 50)   58  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)   59  Presence of cardiac pacemaker (V45 01) (Z95 0)   60  Proteinuria (791 0) (R80 9)   61  Radiculopathy (729 2) (M54 10)   62   Screening for genitourinary condition (V81 6) (Z13 89)   63  Sleep apnea (780 57) (G47 30)   64  Sleep apnea (780 57) (G47 30)   65  Tendonitis (726 90) (M77 9)   66  Urinary incontinence (788 30) (R32)   67  Urinary tract infection (599 0) (N39 0)   68  Venous insufficiency (chronic) (peripheral) (459 81) (I87 2)    Current Meds   1  Allopurinol 100 MG Oral Tablet; TAKE 1 TABLET DAILY FOR GOUT;   Therapy: 73UZQ4412 to (Last Rx:48Vuw1726)  Requested for: 03Srg6561 Ordered   2  B Complex Oral Capsule; TAKE 1 CAPSULE DAILY; Therapy: (Recorded:87Tnp6764) to Recorded   3  Biotin 1000 MCG Oral Tablet; Take 1 tablet daily; Therapy: (Recorded:08Apr2014) to Recorded   4  Calcium-Carb 600 600 MG Oral Tablet; 1 tab daily; Therapy: 41AQN3829 to  Requested for: 72AAX2606 Recorded   5  Centrum Silver Ultra Mens Oral Tablet; 1 tab daily; Therapy: 72DSZ0153 to  Requested for: 37NPI4124 Recorded   6  Cinnamon 500 MG Oral Capsule; Take 1 capsule twice daily; Therapy: (Recorded:08Apr2014) to Recorded   7  Clopidogrel Bisulfate 75 MG Oral Tablet (Plavix); take 1 tablet by mouth once daily; Therapy: 23PYS0479 to (Evaluate:16Oct2016)  Requested for: 06WTM2885; Last   Rx:22Oct2015 Ordered   8  Colcrys 0 6 MG Oral Tablet; TAKE 1 TABLET DAILY AS DIRECTED; Therapy: 58HII9904 to (Evaluate:50Hxy4866); Last Rx:20Elm8795 Ordered   9  Colcrys 0 6 MG Oral Tablet; TAKE 1 TABLET DAILY AS DIRECTED; Therapy: 97KFJ1567 to (Evaluate:60Mwq9758)  Requested for: 65SIM7132; Last   Rx:22Rgo0250 Ordered   10  Colcrys 0 6 MG Oral Tablet; USE AS DIRECTED; Therapy: 22Qct5184 to (Evaluate:23Jan2016)  Requested for: 20Jan2016; Last    Rx:20Jan2016 Ordered   11  Cranberry Fruit Concentrate 59275 MG Oral Capsule; take 1 capsule daily; Therapy: (Recorded:20Gea0530) to Recorded   12  [employer] Fish Oil CAPS; TAKE 1 CAPSULE Daily Recorded   13  Dulera 200-5 MCG/ACT Inhalation Aerosol; INHALE 2 PUFFS TWICE DAILY; Therapy: 17ORQ4438 to (Last Rx:19Nov2013) Ordered   14   Felodipine ER 5 MG Oral Tablet Extended Release 24 Hour; TAKE 1 TABLET ONCE    DAILY  Requested for: 35Rwk1436; Last Rx:36Sjg2856 Ordered   15  Flax Seed Oil 1000 MG Oral Capsule; 2 tabs daily; Therapy: 04BJP8963 to  Requested for: 74CHW6396 Recorded   16  Furosemide 40 MG Oral Tablet; TAKE 1 TABLET DAILY AS DIRECTED; Therapy: (Recorded:08Apr2014) to Recorded   17  Ginkgo Biloba 120 MG Oral Capsule; Take 1 capsule twice daily; Therapy: (Recorded:19Omf0774) to Recorded   18  Glucosamine 1500 Complex Oral Capsule; TAKE 2 CAPSULE Twice daily; Therapy: (Recorded:08Apr2014) to Recorded   19  Lactaid 4500 UNIT Oral Tablet Chewable; CHEW AND SWALLOW 1 TO 3 TABLETS    WITH FIRST BITE OF MILK FOOD; Therapy: (Recorded:55Zhu4092) to Recorded   20  L-Lysine 500 MG Oral Tablet; 1 tab daily; Therapy: 61MGQ9279 to  Requested for: 64NGU4504 Recorded   21  Lyrica 150 MG Oral Capsule; TAKE 1 CAPSULE TWICE DAILY; Therapy: 64DNX2323 to (Evaluate:19Apr2016); Last Rx:20Jan2016 Ordered   22  Magnesium 500 MG Oral Capsule; take 1 capsule daily; Therapy: (Recorded:66Muj1361) to Recorded   23  Nitrostat 0 4 MG Sublingual Tablet Sublingual; DISSOLVE 1 TABLET UNDER THE    TONGUE AS NEEDED FOR CHEST PAIN;    Therapy: 38QQF5664 to (Evaluate:29Oct2015)  Requested for: 77Mwq8303; Last    Rx:34Uhg7834 Ordered   24  Oxycodone-Acetaminophen 5-325 MG Oral Tablet; TAKE 1 TABLET EVERY 12 HOURS    AS NEEDED FOR PAIN;    Therapy: 39Ohf4353 to (Evaluate:81Yap8378); Last Rx:59Cyn8232 Ordered   25  Oxycodone-Acetaminophen 5-325 MG Oral Tablet; TAKE 1 TABLET EVERY 6 HOURS    AS NEEDED FOR PAIN;    Therapy: 93JHK1495 to (Evaluate:14Vfu2338); Last Rx:25Jlu8562 Ordered   26  ProAir  (90 Base) MCG/ACT Inhalation Aerosol Solution; 2 puffs Q4 hours prn    SOB/wheeze; Therapy: 22RYU3413 to (Last Rx:19Nov2013)  Requested for: 48VOB4456 Ordered   27  Selenium  MCG Oral Tablet Extended Release; 1 tab daily;     Therapy: 47NZG5209 to  Requested for: 07WBP8193 Recorded   28  Simvastatin 20 MG Oral Tablet (Zocor); 1 TAB DAILY; Therapy: 15ISF8886 to (Last CE:03KWN5117)  Requested for: 02Jun2015 Ordered   29  Super B Complex-100 TABS; TAKE 1 TABLET DAILY AS DIRECTED; Therapy: (Recorded:08Apr2014) to Recorded   30  TraMADol HCl - 50 MG Oral Tablet; TAKE 1 TABLET 4 TIMES DAILY AS NEEDED FOR    PAIN; Last Rx:20Jan2016 Ordered   31  [place] Pressair 400 MCG/ACT Inhalation Aerosol Powder Breath Activated; INHALE 1    PUFFS Daily; Therapy: 40IHW8711 to (Last Rx:17Rqn9618) Ordered   32  Vitamin C 500 MG Oral Tablet; TAKE 1 TABLET DAILY; Therapy: (Recorded:21Apr2014) to Recorded   33  Vitamin D 1000 UNIT Oral Tablet; TAKE 1 TABLET DAILY; Therapy: (Recorded:08Apr2014) to Recorded   34  Vitamin E 400 UNIT Oral Capsule; 1 tab daily; Therapy: 77BAY7565 to  Requested for: 37MMD4056 Recorded   35  Xarelto 20 MG Oral Tablet; One a day; Therapy: 96JFI5465 to (Last Rx:07Tde8958) Ordered   36  Zinc 50 MG Oral Tablet; TAKE 1 TABLET DAILY; Therapy: (Recorded:08Apr2014) to Recorded    Allergies    1  No Known Drug Allergies    Plan  Benign hypertensive CKD, Chronic kidney disease, stage 3    · (1) BASIC METABOLIC PROFILE; Status:Active - Retrospective By Protocol  Authorization;  Requested for:01Apr2016;     Signatures   Electronically signed by : NICK Del Cid ; Mar 11 2016  3:52PM EST

## 2018-01-17 NOTE — MISCELLANEOUS
Message   Recorded as Task   Date: 05/03/2016 10:20 AM, Created By: Dorien Duverney   Task Name: Follow Up   Assigned To: [facility],Team   Regarding Patient: Chandrakant Abrams, Status: Active   CommentEsaw Medin - 03 May 2016 10:20 AM     TASK CREATED  Anticoagulant hold request faxed to Dr Albrecht Men to hold Xarelto & plavix  Pt to have L4-L5 LESI  JustinoRodger [email or web address] - 03 May 2016 1:26 PM     TASK EDITED  Received fax from Dr Shirley Nunn will permission to hold plavix 7 days prior and Xarelto 2 days prior to injection  Stavropoulos,Yuridia - 04 May 2016 10:10 AM     TASK EDITED  Pt is scheduled for injection 5/26/16  Spoke to wife Waleska Saucedo, instructed that the pt must stop plavix starting 5/19 and stop xarelto starting 5/24/16  Pt to be NPO one hour prior to injection, have a , and to notify the office if pt becomes ill/fever/or starts on abt  Sherry verbalized understanding  [employer]ia [email or web address] 17 - 04 May 2016 10:13 AM     TASK REPLIED TO: Previously Assigned To [place]  Thanks  Active Problems    1  Abrasion (919 0) (T14 8)   2  Acquired ankle/foot deformity (736 70) (M21 969)   3  Acute gout (274 01) (M10 9)   4  Acute gouty arthritis (274 01) (M10 00)   5  Allergic rhinitis (477 9) (J30 9)   6  Aneurysm of abdominal aorta (441 4) (I71 4)   7  Aneurysm of abdominal aorta (441 4) (I71 4)   8  Angina pectoris (413 9) (I20 9)   9  Arteriosclerosis of coronary artery (414 00) (I25 10)   10  Arthralgia of ankle or foot (719 47) (M25 579)   11  Atherosclerosis of arteries of extremities (440 20) (I70 209)   12  Atrial flutter (427 32) (I48 92)   13  Benign essential hypertension (401 1) (I10)   14  Benign hypertensive CKD (403 10) (I12 9)   15  Benign Localized Prostatic Hyperplasia Without Urinary Obstruction With Other Lower    Urinary Tract Symptoms (600 20)   16  Benign prostatic hypertrophy (600 00) (N40 0)   17   Bilateral edema of lower extremity (782 3) (R60 0)   18  Bilateral lumbar radiculopathy (724 4) (M54 16)   19  Blurred vision (368 8) (H53 8)   20  Body mass index (BMI) of 45 0 to 49 9 in adult (V85 42) (Z68 42)   21  Callus (700) (L84)   22  Carpal tunnel syndrome, unspecified laterality (354 0) (G56 00)   23  Cellulitis (682 9) (L03 90)   24  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)   25  Chronic kidney disease, stage 3 (585 3) (N18 3)   26  Chronic obstructive pulmonary disease (496) (J44 9)   27  Chronic obstructive pulmonary disease (496) (J44 9)   28  Chronic pain syndrome (338 4) (G89 4)   29  Colon cancer screening (V76 51) (Z12 11)   30  Colon, diverticulosis (562 10) (K57 30)   31  Congestive heart failure (428 0) (I50 9)   32  Coronary artery disease (414 00) (I25 10)   33  Deep venous insufficiency (459 81) (I87 2)   34  Degenerative disc disease, lumbar (722 52) (M51 36)   35  Difficulty in walking (719 7) (R26 2)   36  Fecal soiling (787 62) (R15 1)   37  Fibromyalgia (729 1) (M79 7)   38  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 3)   39  Gastrointestinal symptoms (787 99) (R19 8)   40  Gout (274 9) (M10 9)   41  Hyperkalemia (276 7) (E87 5)   42  Hyperlipidemia (272 4) (E78 5)   43  Lumbar canal stenosis (724 02) (M48 06)   44  Lumbar neuritis (724 4) (M54 16)   45  Memory Lapses Or Loss (780 93)   46  Microscopic hematuria (599 72) (R31 2)   47  Moderate or severe vision impairment, one eye (369 60) (H54 40)   48  Morbid or severe obesity due to excess calories (278 01) (E66 01)   49  Morbid or severe obesity due to excess calories (278 01) (E66 01)   50  Needs flu shot (V04 81) (Z23)   51  Nicotine dependence (305 1) (F17 200)   52  Olecranon bursitis (726 33) (M70 20)   53  Onychogryphosis (703 8)   54  Onychomycosis (110 1) (B35 1)   55  Osteoarthritis, knee/lower leg (715 96) (M17 9)   56  Pain in joints of both feet (719 47) (M79 671,M79 672)   57  Paronychia of toe, unspecified laterality (681 11) (L03 039)   58   Permanent Pacemaker Type Dual-Chamber   59  Pes planus, congenital (754 61) (Q66 50)   60  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)   61  Presence of cardiac pacemaker (V45 01) (Z95 0)   62  Proteinuria (791 0) (R80 9)   63  Radiculopathy (729 2) (M54 10)   64  Screening for genitourinary condition (V81 6) (Z13 89)   65  Skin tear of right lower leg without complication, initial encounter (891 0) (S81 801A)   66  Sleep apnea (780 57) (G47 30)   67  Sleep apnea (780 57) (G47 30)   68  Tenderness in limb (729 5) (M79 609)   69  Tendonitis (726 90) (M77 9)   70  Urinary incontinence (788 30) (R32)   71  Urinary tract infection (599 0) (N39 0)   72  Venous insufficiency (chronic) (peripheral) (459 81) (I87 2)    Current Meds   1  Allopurinol 100 MG Oral Tablet; TAKE 1 TABLET DAILY FOR GOUT;   Therapy: 24FYY3075 to (Last Rx:28Rfq0158)  Requested for: 53Tgy6177 Ordered   2  B Complex Oral Capsule; TAKE 1 CAPSULE DAILY; Therapy: (Recorded:65Ogg3228) to Recorded   3  Biotin 1000 MCG Oral Tablet; Take 1 tablet daily; Therapy: (Recorded:07Gae9943) to Recorded   4  Calcium-Carb 600 600 MG Oral Tablet; 1 tab daily; Therapy: 84WLA9791 to  Requested for: 78MGU6825 Recorded   5  Centrum Silver Ultra Mens Oral Tablet; 1 tab daily; Therapy: 11YHM5435 to  Requested for: 18MXV1562 Recorded   6  Cephalexin 250 MG Oral Tablet (Cephalexin Monohydrate); TAKE 1 TABLET EVERY 6   HOURS DAILY; Therapy: 68UUX1601 to (Evaluate:47Uce4121)  Requested for: 14GJP8801; Last   Rx:06Apr2016 Ordered   7  Cinnamon 500 MG Oral Capsule; Take 1 capsule twice daily; Therapy: (Recorded:54Plj4922) to Recorded   8  Clopidogrel Bisulfate 75 MG Oral Tablet (Plavix); take 1 tablet by mouth once daily; Therapy: 93BBN4551 to (Evaluate:63Qsy8470)  Requested for: 75VZQ3689; Last   Rx:22Oct2015 Ordered   9  Colcrys 0 6 MG Oral Tablet; TAKE 1 TABLET DAILY AS DIRECTED; Therapy: 64MMT7610 to (Evaluate:21Jun2016); Last Rx:24Mar2016 Ordered   10   Cranberry Fruit Concentrate 27420 MG Oral Capsule; Take 1 capsule twice daily    Recorded   11  [employer] Fish Oil CAPS; TAKE 1 CAPSULE Daily Recorded   12  Dulera 200-5 MCG/ACT Inhalation Aerosol; INHALE 2 PUFFS TWICE DAILY; Therapy: 17JBB9875 to (Last Rx:19Nov2013) Ordered   13  Felodipine ER 5 MG Oral Tablet Extended Release 24 Hour; take 1 tablet once daily     Requested for: 66AVO3444; Last Rx:24Mar2016 Ordered   14  Flax Seed Oil 1000 MG Oral Capsule; 2 tabs daily; Therapy: 42TAS6746 to  Requested for: 57WUI7404 Recorded   15  Furosemide 40 MG Oral Tablet; TAKE 1 TABLET DAILY AS DIRECTED; Therapy: (Recorded:08Apr2014) to Recorded   16  Ginkgo Biloba 120 MG Oral Capsule; Take 1 capsule twice daily; Therapy: (Recorded:21Apr2014) to Recorded   17  Glucosamine 1500 Complex Oral Capsule; TAKE 2 CAPSULE Twice daily; Therapy: (Recorded:08Apr2014) to Recorded   18  Lactaid 4500 UNIT Oral Tablet Chewable; CHEW AND SWALLOW 1 TO 3 TABLETS    WITH FIRST BITE OF MILK FOOD; Therapy: (Recorded:21Apr2014) to Recorded   19  L-Lysine 500 MG Oral Tablet; 1 tab daily; Therapy: 48FAK2827 to  Requested for: 10GPB2715 Recorded   20  Lyrica 150 MG Oral Capsule; TAKE 1 CAPSULE 3 TIMES DAILY; Therapy: 32PYO5130 to (Evaluate:21Jun2016); Last Rx:23Mar2016 Ordered   21  Magnesium 500 MG Oral Capsule; take 1 capsule daily; Therapy: (Recorded:21Apr2014) to Recorded   22  Nitrostat 0 4 MG Sublingual Tablet Sublingual; DISSOLVE 1 TABLET UNDER THE    TONGUE AS NEEDED FOR CHEST PAIN;    Therapy: 55XTO1982 to (Evaluate:29Oct2015)  Requested for: 94Qgn7192; Last    Rx:19Rum6344 Ordered   23  Oxycodone-Acetaminophen 5-325 MG Oral Tablet; TAKE 1 TABLET EVERY 6 HOURS    AS NEEDED FOR PAIN;    Therapy: 71GLP5609 to (Evaluate:60Jtw6996); Last Rx:93Pwi5403 Ordered   24  ProAir  (90 Base) MCG/ACT Inhalation Aerosol Solution; 2 puffs Q4 hours prn    SOB/wheeze; Therapy: 28VCP3594 to (Last Rx:19Nov2013)  Requested for: 70ROF3385 Ordered   25   Selenium  MCG Oral Tablet Extended Release; 1 tab daily; Therapy: 08OSG6513 to  Requested for: 82USB9444 Recorded   26  Silver Sulfadiazine 1 % External Cream; APPLY TO AFFECTED AREA TWICE DAILY AS    DIRECTED; Therapy: 05ECE6053 to (Last Rx:93Adk2260)  Requested for: 06Apr2016 Ordered   27  Simvastatin 20 MG Oral Tablet (Zocor); 1 TAB DAILY; Therapy: 84LMZ5180 to (Last Rx:28Apr2016)  Requested for: 28Apr2016 Ordered   28  Super B Complex-100 TABS; TAKE 1 TABLET DAILY AS DIRECTED; Therapy: (Recorded:08Apr2014) to Recorded   29  TraMADol HCl - 50 MG Oral Tablet; TAKE 1 TABLET 4 TIMES DAILY AS NEEDED FOR    PAIN; Last Rx:07Vcp5045 Ordered   30  [place] [employer] Pressair 400 MCG/ACT Inhalation Aerosol Powder Breath Activated; INHALE 1    PUFFS Daily; Therapy: 24VQU3864 to (Last Rx:98Ska8613) Ordered   31  VESIcare 5 MG Oral Tablet; Take 1 tablet daily Recorded   32  Vitamin C 500 MG Oral Tablet; TAKE 1 TABLET DAILY; Therapy: (Recorded:21Apr2014) to Recorded   33  Vitamin D 1000 UNIT Oral Tablet; TAKE 1 TABLET DAILY; Therapy: (Recorded:08Apr2014) to Recorded   34  Vitamin E 400 UNIT Oral Capsule; 1 tab daily; Therapy: 80CKS0643 to  Requested for: 34BAN8260 Recorded   35  Xarelto 20 MG Oral Tablet; One a day; Therapy: 09UUT3374 to (Last Rx:87Bui5887) Ordered   36  Zinc 50 MG Oral Tablet; TAKE 1 TABLET DAILY; Therapy: (Recorded:08Apr2014) to Recorded    Allergies    1   No Known Drug Allergies    Signatures   Electronically signed by : Colleen Reyes RN; May  4 2016 10:49AM EST                       (Author)

## 2018-01-23 VITALS
WEIGHT: 315 LBS | BODY MASS INDEX: 42.66 KG/M2 | SYSTOLIC BLOOD PRESSURE: 126 MMHG | DIASTOLIC BLOOD PRESSURE: 76 MMHG | HEIGHT: 72 IN

## 2018-01-23 NOTE — RESULT NOTES
Discussion/Summary   Blood glucose still a little high  Low carb diet  Hydrate well  Continue medications  Follow up nephrology     Verified Results  (1) COMPREHENSIVE METABOLIC PANEL [id number] 72:05KE Amy Cade [facility]     Test Name Result Flag Reference   Glucose, Serum 129 mg/dL H 65-99   BUN 19 mg/dL  8-27   Creatinine, Serum 1 46 mg/dL H 0 76-1 27   BUN/Creatinine Ratio 13  10-24   Sodium, Serum 143 mmol/L  134-144   Potassium, Serum 5 3 mmol/L H 3 5-5 2   Chloride, Serum 101 mmol/L     Carbon Dioxide, Total 25 mmol/L  18-29   Calcium, Serum 9 5 mg/dL  8 6-10 2   Protein, Total, Serum 7 5 g/dL  6 0-8 5   Albumin, Serum 4 2 g/dL  3 5-4 8   Globulin, Total 3 3 g/dL  1 5-4 5   A/G Ratio 1 3  1 2-2 2   Bilirubin, Total 0 7 mg/dL  0 0-1 2   Alkaline Phosphatase, S 63 IU/L     AST (SGOT) 22 IU/L  0-40   ALT (SGPT) 23 IU/L  0-44   eGFR If NonAfricn Am 46 mL/min/1 73 L >59   eGFR If Africn Am 53 mL/min/1 73 L >59     (1) LIPID PANEL, FASTING [id number] 11:43AM Amy Cade [employer]     Test Name Result Flag Reference   Cholesterol, Total 137 mg/dL  100-199   Triglycerides 102 mg/dL  0-149   HDL Cholesterol 41 mg/dL  >39   VLDL Cholesterol Allen 20 mg/dL  5-40   LDL Cholesterol Calc 76 mg/dL  0-99   T  Chol/HDL Ratio 3 3 ratio units  0 0-5 0   T  Chol/HDL Ratio                                                             Men  Women                                               1/2 Avg  Risk  3 4    3 3                                                   Avg Risk  5 0    4 4                                                2X Avg  Risk  9 6    7 1                                                3X Avg  Risk 23 4   11 0     () [facility] CKD Program [id number] 11:43AM Amy Cade [employer]     Test Name Result Flag Reference   Interpretation Note     -------------------------------  CHRONIC KIDNEY DISEASE:  EGFR, BLOOD PRESSURE, AND PROTEINURIA ASSESSMENT  Estimated GFR has decreased, was 53 and now is 46  mL/min/1 73mE2  Current eGFR corresponds to CKD stage 3a  Multiply eGFR by 1 159 if patient is   Potassium is above goal and has risen, was 4 8 and now is  5 3 mmol/L  EGFR, BLOOD PRESSURE, AND PROTEINURIA TREATMENT SUGGESTIONS  -  Guidelines recommend a target blood pressure of 140/90 mmHg  or less in CKD patients to reduce cardiovascular risk and  CKD progression  A higher blood pressure target may be  appropriate in older individuals to avoid symptomatic  hypotension  ACEI or ARB may increase serum potassium  Maintain low potassium diet and avoid salt substitutes and  potassium retaining medications (e g  NSAIDS, potassium  sparing diuretics, trimethoprim/sulfa)  Assessment of  albuminuria (urine albumin:creatinine ratio or urine  protein:creatinine ratio preferred) is recommended at least  annually in CKD patients for staging and disease prognosis  EGFR, BLOOD PRESSURE, AND PROTEINURIA FOLLOW-UP  -  Spot Urine Panel is recommended by KDOQI guidelines, at  least yearly; fasting Renal Panel within 1 month;  -  BONE and MINERAL ASSESSMENT  Calcium is within goal and has not changed significantly,  was 9 2 and now is 9 5 mg/dL  Carbon Dioxide is within goal  and has not changed significantly, was 24 and now is 25  mmol/L  Guidelines recommend the measurement of 25-hydroxy  vitamin D in patients with CKD  BONE and MINERAL TREATMENT SUGGESTIONS  -  Interpretations require simultaneous measurements of serum  calcium and phosphorus  BONE and MINERAL FOLLOW-UP  -  fasting PTH with Renal Panel and 25-Hydroxy Vitamin D are  recommended by KDOQI guidelines, at least yearly;  -  LIPIDS ASSESSMENT  LDL-C is normal and has not changed significantly, was 83  and now is 76 mg/dL  Triglyceride is normal and has risen,  was 57 and now is 102 mg/dL  Non-HDL Cholesterol is optimal  and has not changed significantly, was 94 and now is 96  mg/dL  HDL-C is normal and has decreased, was 51 and now is  41 mg/dL    LIPIDS TREATMENT SUGGESTIONS  -  Therapeutic lifestyle changes are always valuable to  maintain optimal blood lipid status (diet, exercise, weight  management)  If at least a 50% LDL reduction from baseline  has not been achieved, begin or increase statin  Consider  measurement of LDL particle number or Apo B to adjudicate  need for further LDL lowering therapy  If statin cannot be  tolerated or increased, alternatives include use of an  intestinal agent (ezetimibe or bile acid sequestrant) or  niacin  LIPIDS FOLLOW-UP  -  fasting Lipid Panel within 12 months;  -  ANEMIA ASSESSMENT  Most recent order does not include a CBC Panel or iron  studies  ANEMIA FOLLOW-UP  -  CBC is recommended by KDOQI guidelines, at least yearly;  -------------------------------  DISCLAIMER  These assessments and treatment suggestions are provided as  a convenience in support of the physician-patient  relationship and are not intended to replace the physician's  clinical judgment  They are derived from national guidelines  in addition to other evidence and expert opinion  The  clinician should consider this information within the  context of clinical opinion and the individual patient  SEE GUIDANCE FOR CHRONIC KIDNEY DISEASE PROGRAM: Kidney  Disease Improving Global Outcomes (KDIGO) clinical practice  guidelines are at http://kdigo  org/home/guidelines/   [address] [facility] Kidney Disease Outcomes Quality  Initiative (KDOQI (TM)), with its limitations and  disclaimers, are at www kidney  org/professionals/KDOQI  This  program is intended for patients who have been diagnosed  with stages 3, 4, or pre-dialysis 5 CKD  It is not intended  for children, pregnant patients, or transplant patients  PDF Image   [facility]) Cardiovascular Risk Assessment [id number] 11:43AM Good Cade     Test Name Result Flag Reference   Interpretation Note     Supplemental report is available     PDF Image Not applicable

## 2018-01-26 ENCOUNTER — TELEPHONE (OUTPATIENT)
Dept: FAMILY MEDICINE CLINIC | Facility: CLINIC | Age: 78
End: 2018-01-26

## 2018-01-26 DIAGNOSIS — I10 ESSENTIAL HYPERTENSION: ICD-10-CM

## 2018-01-26 DIAGNOSIS — N42.9 PROSTATE DISORDER: ICD-10-CM

## 2018-01-26 DIAGNOSIS — I25.10 CORONARY ARTERY ARTERIOSCLEROSIS: Primary | ICD-10-CM

## 2018-01-26 DIAGNOSIS — E79.0 HYPERURICEMIA: ICD-10-CM

## 2018-01-26 RX ORDER — CLOPIDOGREL BISULFATE 75 MG/1
75 TABLET ORAL DAILY
Qty: 90 TABLET | Refills: 0 | Status: SHIPPED | OUTPATIENT
Start: 2018-01-26 | End: 2018-04-20 | Stop reason: SDUPTHER

## 2018-01-26 RX ORDER — CLOPIDOGREL BISULFATE 75 MG/1
75 TABLET ORAL DAILY
Qty: 90 TABLET | Refills: 3 | Status: SHIPPED | OUTPATIENT
Start: 2018-01-26 | End: 2018-01-26 | Stop reason: SDUPTHER

## 2018-01-26 RX ORDER — FELODIPINE 5 MG/1
5 TABLET, EXTENDED RELEASE ORAL DAILY
Qty: 90 TABLET | Refills: 3 | Status: SHIPPED | OUTPATIENT
Start: 2018-01-26 | End: 2018-01-26 | Stop reason: SDUPTHER

## 2018-01-26 RX ORDER — FINASTERIDE 5 MG/1
5 TABLET, FILM COATED ORAL DAILY
Qty: 90 TABLET | Refills: 0 | Status: SHIPPED | OUTPATIENT
Start: 2018-01-26 | End: 2018-05-01 | Stop reason: SDUPTHER

## 2018-01-26 RX ORDER — FINASTERIDE 5 MG/1
5 TABLET, FILM COATED ORAL DAILY
Qty: 90 TABLET | Refills: 3 | Status: SHIPPED | OUTPATIENT
Start: 2018-01-26 | End: 2018-01-26 | Stop reason: SDUPTHER

## 2018-01-26 RX ORDER — ALLOPURINOL 100 MG/1
100 TABLET ORAL DAILY
Qty: 90 TABLET | Refills: 3 | Status: SHIPPED | OUTPATIENT
Start: 2018-01-26 | End: 2018-01-26 | Stop reason: SDUPTHER

## 2018-01-26 RX ORDER — FELODIPINE 5 MG/1
5 TABLET, EXTENDED RELEASE ORAL DAILY
Qty: 90 TABLET | Refills: 0 | Status: SHIPPED | OUTPATIENT
Start: 2018-01-26 | End: 2018-04-20 | Stop reason: SDUPTHER

## 2018-01-26 RX ORDER — ALLOPURINOL 100 MG/1
100 TABLET ORAL DAILY
Qty: 90 TABLET | Refills: 0 | Status: SHIPPED | OUTPATIENT
Start: 2018-01-26 | End: 2018-04-20 | Stop reason: SDUPTHER

## 2018-01-26 RX ORDER — SIMVASTATIN 20 MG
20 TABLET ORAL
Qty: 90 TABLET | Refills: 3 | Status: SHIPPED | OUTPATIENT
Start: 2018-01-26 | End: 2018-01-26 | Stop reason: SDUPTHER

## 2018-01-26 RX ORDER — SIMVASTATIN 20 MG
20 TABLET ORAL
Qty: 90 TABLET | Refills: 0 | Status: SHIPPED | OUTPATIENT
Start: 2018-01-26 | End: 2018-04-20 | Stop reason: SDUPTHER

## 2018-01-31 ENCOUNTER — OFFICE VISIT (OUTPATIENT)
Dept: PODIATRY | Facility: CLINIC | Age: 78
End: 2018-01-31
Payer: MEDICARE

## 2018-01-31 VITALS
BODY MASS INDEX: 42.66 KG/M2 | DIASTOLIC BLOOD PRESSURE: 77 MMHG | HEIGHT: 72 IN | SYSTOLIC BLOOD PRESSURE: 132 MMHG | HEART RATE: 86 BPM | RESPIRATION RATE: 19 BRPM | WEIGHT: 315 LBS

## 2018-01-31 DIAGNOSIS — M21.969 ACQUIRED DEFORMITY OF FOOT, UNSPECIFIED LATERALITY: Primary | ICD-10-CM

## 2018-01-31 DIAGNOSIS — B35.1 ONYCHOMYCOSIS: ICD-10-CM

## 2018-01-31 DIAGNOSIS — M25.572 PAIN IN JOINTS OF BOTH FEET: ICD-10-CM

## 2018-01-31 DIAGNOSIS — M25.571 PAIN IN JOINTS OF BOTH FEET: ICD-10-CM

## 2018-01-31 DIAGNOSIS — M79.672 PAIN IN BOTH FEET: ICD-10-CM

## 2018-01-31 DIAGNOSIS — B35.9 DERMATOPHYTOSIS: ICD-10-CM

## 2018-01-31 DIAGNOSIS — I70.209 PERIPHERAL ARTERIOSCLEROSIS (HCC): ICD-10-CM

## 2018-01-31 DIAGNOSIS — M79.671 PAIN IN BOTH FEET: ICD-10-CM

## 2018-01-31 PROCEDURE — 99213 OFFICE O/P EST LOW 20 MIN: CPT | Performed by: PODIATRIST

## 2018-01-31 NOTE — PROGRESS NOTES
Assessment/Plan:    No problem-specific Assessment & Plan notes found for this encounter  Diagnoses and all orders for this visit:    Acquired deformity of foot, unspecified laterality    Pain in joints of both feet    Dermatophytosis    Peripheral arteriosclerosis (HCC)    Pain in both feet    Onychomycosis        Subjective:      Patient ID: Kathryn Britt is a 68 y o  male  HPI    The following portions of the patient's history were reviewed and updated as appropriate: allergies, current medications, past family history, past medical history, past social history, past surgical history and problem listAssessment  1  Acquired ankle/foot deformity (736 70) (M21 969)   2  Onychomycosis (110 1) (B35 1)   3  Difficulty in walking (719 7) (R26 2)    Plan     · Follow-up visit in 9 weeks Evaluation and Treatment  Follow-up  Status: Hold For -Scheduling  Requested for: 20FUL1972   · There are many things you can do at home to ensure good foot health ; Status:Complete;  Done: 08KUB9536 11:09AM   · Wear shoes that give your toes plenty of room ; Status:Complete;   Done: 63LCI188639:73OC    Discussion/Summary  The patient, patient's family was counseled regarding instructions for management,-- patient and family education  Patient is able to Self-Care  The treatment plan was reviewed with the patient/guardian  The patient/guardian understands and agrees with the treatment plan      Chief Complaint  Patient has pain in both feet  History of Present Illness  HPI: Agent has continued bilateral lower extremity pain  He is taking Lyrica  He takes Percocet when necessary  He has back pain  His legs feel heavy and weak  Review of Systems   Constitutional: no fever-- and-- no chills  ENT: no nasal discharge  Cardiovascular: no palpitations  Gastrointestinal: no constipation  Genitourinary: no urinary hesitancy  Integumentary: no rashes  Neurological: no headache-- and-- no dizziness    Other Symptoms: anxiety insomnia  Active Problems    1  Abrasion (919 0) (T14 8XXA)   2  Acquired ankle/foot deformity (736 70) (M21 969)   3  Acute gout (274 01) (M10 9)   4  Acute gouty arthritis (274 01) (M10 9)   5  Allergic rhinitis (477 9) (J30 9)   6  Aneurysm of abdominal aorta (441 4) (I71 4)   7  Angina pectoris (413 9) (I20 9)   8  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) (F06 4)   9  Arteriosclerosis of coronary artery (414 00) (I25 10)   10  Arthralgia of ankle or foot (719 47) (M25 579)   11  Atherosclerosis of arteries of extremities (440 20) (I70 209)   12  Atrial fibrillation, unspecified type (427 31) (I48 91)   13  Atrial flutter (427 32) (I48 92)   14  Benign hypertension with CKD (chronic kidney disease) stage III (403 10,585 3)  (I12 9,N18 3)   15  Benign prostatic hypertrophy (600 00) (N40 0)   16  Bilateral edema of lower extremity (782 3) (R60 0)   17  Bilateral lumbar radiculopathy (724 4) (M54 16)   18  Blurred vision (368 8) (H53 8)   19  Body mass index (BMI) of 45 0 to 49 9 in adult (V85 42) (Z68 42)   20  Callus (700) (L84)   21  Carpal tunnel syndrome, unspecified laterality (354 0) (G56 00)   22  Cellulitis (682 9) (L03 90)   23  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)   24  Chronic low back pain (724 2,338 29) (M54 5,G89 29)   25  Chronic obstructive pulmonary disease (496) (J44 9)   26  Chronic pain syndrome (338 4) (G89 4)   27  CKD (chronic kidney disease), stage III (585 3) (N18 3)   28  Colon cancer screening (V76 51) (Z12 11)   29  Colon, diverticulosis (562 10) (K57 30)   30  Congestive heart failure (428 0) (I50 9)   31  Coronary artery disease (414 00) (I25 10)   32  Deep venous insufficiency (459 81) (I87 2)   33  Degenerative disc disease, lumbar (722 52) (M51 36)   34  Difficulty in walking (719 7) (R26 2)   35  Encounter for special screening examination for genitourinary disorder (V81 6) (Z13 89)   36  Fecal soiling (787 62) (R15 1)   37   Fibromyalgia (729 1) (M79 7)   38  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 3)   39  Gastrointestinal symptoms (787 99) (R19 8)   40  Gout (274 9) (M10 9)   41  Hyperkalemia (276 7) (E87 5)   42  Hyperlipidemia (272 4) (E78 5)   43  Insomnia, persistent (307 42) (G47 00)   44  Lumbar canal stenosis (724 02) (M48 061)   45  Lumbar neuritis (724 4) (M54 16)   46  Memory loss (780 93) (R41 3)   47  Microscopic hematuria (599 72) (R31 29)   48  Moderate or severe vision impairment, one eye (369 60) (H54 40)   49  Morbid or severe obesity due to excess calories (278 01) (E66 01)   50  Morbid or severe obesity due to excess calories (278 01) (E66 01)   51  Needs flu shot (V04 81) (Z23)   52  Nicotine dependence (305 1) (F17 200)   53  Olecranon bursitis (726 33) (M70 20)   54  Onychogryphosis (703 8)   55  Onychomycosis (110 1) (B35 1)   56  Osteoarthritis, knee/lower leg (715 96) (M17 9)   57  Pacemaker Permanent Placement Dual-Chamber   58  Pain in joints of both feet (719 47) (M79 671,M79 672)   59  Paronychia of toe, unspecified laterality (681 11) (L03 039)   60  Pes planus, congenital (754 61) (Q66 50)   61  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)   62  Presence of cardiac pacemaker (V45 01) (Z95 0)   63  Proteinuria (791 0) (R80 9)   64  Radiculopathy (729 2) (M54 10)   65  Screening for genitourinary condition (V81 6) (Z13 89)   66  Skin tear of right lower leg without complication, initial encounter (891 0) (S81 811A)   67  Sleep apnea (780 57) (G47 30)   68  Sleep apnea (780 57) (G47 30)   69  Tenderness in limb (729 5) (M79 609)   70  Tendonitis (726 90) (M77 9)   71  Tinea corporis (110 5) (B35 4)   72  Urinary incontinence (788 30) (R32)   73   Venous insufficiency (chronic) (peripheral) (459 81) (I87 2)    Past Medical History   · History of Atrial flutter (427 32) (I48 92)   · History of Cellulitis of foot (682 7) (L03 119)   · History of Colon cancer screening (V76 51) (Z12 11)   · History of Difficulty in walking (719 7) (R26 2)   · History of Difficulty in walking (719 7) (R26 2)   · General medical examination (V70 9) (Z00 00)   · History of backache (V13 59) (Z87 39)   · History of radiculopathy (V12 49) (Z86 69)   · 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)    The active problems and past medical history were reviewed and updated today  Surgical History   · History of Cath Stent Placement   · History of Cholecystectomy   · History of Knee Surgery   · Pacemaker Permanent Placement Dual-Chamber   · History of Permanent Pacemaker Implantation Date    The surgical history was reviewed and updated today  Family History  Mother    · Family history of Cancer   · Family history of Family Health Status Of Mother -    · Family history of Heart Disease (V17 49)   · Family history of Hypertension (V17 49)   · Family history of Mother  At Age 80  Father    · Family history of Father  At Age 47   · Family history of Heart Disease (V17 49)  Brother    · Family history of Heart Disease (V17 49)    The family history was reviewed and updated today  Social History     · Denied: Alcohol Use (History)   · Being A Social Drinker   · Cigarette smoker (305 1) (F17 210)   · Current Every Day Smoker (305 1)   · Current Smoker (V15 82)   · Daily Tea Consumption (10  Cups/Day)   · Denied: Drug Use (305 90)   · Marital History - Currently    · Recently Stopping Smoking (V15 82)  The social history was reviewed and updated today  The social history was reviewed and is unchanged  Current Meds   1  Allopurinol 100 MG Oral Tablet; TAKE ONE TABLET BY MOUTH EVERY DAY FOR GOUT; Therapy: 47KDQ6842 to (Evaluate:49Evl4276)  Requested for: 2017; Last Rx:2017 Ordered   2  Amoxicillin 500 MG Oral Tablet; 4 po 1 hour prior to procedure  Requested for: 16HUN5085; Last Rx:2017 Ordered   3  B Complex Oral Capsule; TAKE 1 CAPSULE DAILY; Therapy: (Recorded:2014) to Recorded   4   Betamethasone Dipropionate 0 05 % External Cream; APPLY SPARINGLY TO AFFECTED AREA(S) TWICE DAILY; Therapy: 97Cdr8476 to (Last Rx:80Jdq5068)  Requested for: 70Ydy1778 Ordered   5  Biotin 1000 MCG Oral Tablet; Take 1 tablet daily; Therapy: (Recorded:08Apr2014) to Recorded   6  Calcium-Carb 600 600 MG Oral Tablet; TAKE 2 TABLETS DAILY; Therapy: 22KFX6762 to  Requested for: 02Fox6044 Recorded   7  Centrum Silver Ultra Mens Oral Tablet; 1 tab daily; Therapy: 02XWD9146 to  Requested for: 88ADU9370 Recorded   8  Cinnamon 500 MG Oral Capsule; Take 1 capsule twice daily; Therapy: (Recorded:08Apr2014) to Recorded   9  ClonazePAM 1 MG Oral Tablet Disintegrating; PLACE 1 TABLET ON TONGUE AND ALLOW TO DISSOLVE TWICE DAILY AS NEEDED; Therapy: 26SKD4491 to (Evaluate:16Sep2016); Last Rx:15Rlx1018 Ordered   10  Clopidogrel Bisulfate 75 MG Oral Tablet; take one tablet by mouth every day; Therapy: 00XIA1361 to (Evaluate:97Gue5089)  Requested for: 11Aug2017; Last  Rx:11Aug2017 Ordered   11  Clotrimazole 1 % External Cream; APPLY 2-3 TIMES DAILY TO AFFECTED AREA(S); Therapy: 21Aug2017 to (Last Rx:05Caf3583)  Requested for: 21Aug2017 Ordered   12  Colcrys 0 6 MG Oral Tablet; TAKE 1 TABLET DAILY AS DIRECTED; Therapy: 24LAB3562 to (Evaluate:21Jun2016); Last Rx:24Mar2016 Ordered   13  Cranberry Fruit Concentrate 61583 MG Oral Capsule; Take 1 capsule twice daily  Recorded   14  [facility] Fish Oil CAPS; TAKE 1 CAPSULE Daily Recorded   15  Donepezil HCl - 5 MG Oral Tablet; 1 every day; Therapy: 10Ngc1198 to (Last Rx:34Ate1023)  Requested for: 37Dnv9390 Ordered   16  Felodipine ER 5 MG Oral Tablet Extended Release 24 Hour; take 1 tablet by mouth once  daily; Therapy: 39PLR1318 to (DKGWDFJP:34PBP1979)  Requested for: 50Klc2620; Last  Rx:39Dmi0023 Ordered   17  Finasteride 5 MG Oral Tablet; 1 DAILY; Therapy: 34Xpm6858 to (Last Rx:61Jwg8214)  Requested for: 04Xcb4116 Ordered   18  Flax Seed Oil 1000 MG Oral Capsule; 2 tabs daily; Therapy: 37YBJ2294 to  Requested for: 26UXM0819 Recorded   19   Ginkgo Biloba 120 MG Oral Capsule; Take 1 capsule twice daily; Therapy: (Recorded:21Apr2014) to Recorded   20  Glucosamine 1500 Complex Oral Capsule; TAKE 2 CAPSULE Twice daily; Therapy: (Recorded:08Apr2014) to Recorded   21  Lactaid 4500 UNIT CHEW; CHEW AND SWALLOW 1 TO 3 TABLETS WITH FIRST BITE  OF MILK FOOD; Therapy: (Recorded:21Apr2014) to Recorded   22  Magnesium 500 MG Oral Capsule; take 1 capsule daily; Therapy: (Recorded:21Apr2014) to Recorded   23  Nitrostat 0 4 MG Sublingual Tablet Sublingual; DISSOLVE 1 TABLET UNDER THE  TONGUE AS NEEDED FOR CHEST PAIN;  Therapy: 03BMN1204 to (Evaluate:29Oct2015)  Requested for: 63Hzu0013; Last  Rx:21Pbb5870 Ordered   24  PARoxetine HCl - 20 MG Oral Tablet; 1 EVERY MORNING; Therapy: 98RCP2241 to (Last Rx:51Qhi3948)  Requested for: 85Viw2850 Ordered   25  ProAir  (90 Base) MCG/ACT Inhalation Aerosol Solution; 2 puffs Q4 hours prn  SOB/wheeze; Therapy: 36FEY5994 to (Last Rx:85Xkx4392)  Requested for: 20QIZ7906 Ordered   26  Selenium  MCG Oral Tablet Extended Release; 1 tab daily; Therapy: 96IVY8131 to  Requested for: 45YQD2946 Recorded   27  Silver Sulfadiazine 1 % External Cream; APPLY TO AFFECTED AREA TWICE DAILY AS  DIRECTED; Therapy: 92CUE6774 to (Last Rx:06Apr2016)  Requested for: 12Fln5534 Ordered   28  Simvastatin 20 MG Oral Tablet; 1 TAB DAILY; Therapy: 40AZL6499 to (Last Rx:93Ekt9903)  Requested for: 08Azt5781 Ordered   29  TraMADol HCl - 50 MG Oral Tablet; TAKE 1 TABLET 4 TIMES DAILY AS NEEDED FOR  PAIN; Last Rx:78Yqo2824 Ordered   30  Vitamin C 500 MG Oral Tablet; TAKE 1 TABLET DAILY; Therapy: (Recorded:21Apr2014) to Recorded   31  Vitamin D 1000 UNIT Oral Tablet; TAKE 1 TABLET DAILY; Therapy: (Recorded:08Apr2014) to Recorded   32  Xarelto 20 MG Oral Tablet; One a day; Therapy: 87ULO9092 to (Last Rx:46Oar4012) Ordered   33  Zinc 50 MG Oral Tablet; TAKE 1 TABLET DAILY;   Therapy: (Recorded:08Apr2014) to Recorded    The medication list was reviewed and updated today  Allergies  1  No Known Drug Allergies    Vitals   Recorded: 22Nov2017 10:52AM   Heart Rate 86   Respiration 20   Systolic 602   Diastolic 84   Height 6 ft    Weight 360 lb    BMI Calculated 48 83   BSA Calculated 2 73     Physical Exam  Left Foot: Appearance: Normal except as noted: excessive pronation-- and-- pes planus  Second toe deformities include hammer toe  Third toe deformities include hammer toe  Forth toe deformities include hammer toe  Fifth toe deformities include hammer toe  Tenderness: None except the great toe,-- distal first metatarsal,-- distal fifth metatarsal-- and-- insertion of the plantar fascia  Positive Brian sign third left intermetatarsal space  ROM: Full  Motor: Normal   Right Foot: Appearance: Normal except as noted: excessive pronation-- and-- pes planus  Second toe deformities include hammer toe  Third toe deformities include hammer toe  Forth toe deformities include hammer toe  Fifth toe deformities include hammer toe  Evaluation of the great toe nail demonstrates an ingrown nail  Tenderness: None except the insertion of the plantar fascia  Left Ankle: Appearance: Normal except erythema,-- swelling-- and-- swelling laterally  Tenderness: None except the tibiotalar joint  Right Ankle: ROM: Full  Neurological Exam: performed  Deep tendon reflexes: + tinel of right tibial nerve  Vascular Exam: performed Dorsalis pedis pulses were present bilaterally  Posterior tibial pulses were present bilaterally  Elevation Pallor: absent bilaterally  Capillary refill time was less than 1 second bilaterally  Edema: moderate bilaterally-- and-- 4/7 pitting  neg  buddy  Toenails: All of the toenails were elongated,-- hypertrophied,-- discolored,-- ingrown,-- shown to have subungual debris,-- tender-- and-- Mycotic with onychauxis  Hyperkeratosis: present on both first toes,-- present on both first sub metatarsals-- and-- present on both fifth sub metatarsals     Shoe Gear Evaluation: performed ()  Recommendation(s): extra depth diabetic shoes  Procedure  All abnormal skin a mycotic nails debrided without complication her pain  Gentian violet applied to webspace is      Future Appointments    Date/Time Provider Specialty Site   01/31/2018 11:30 AM Inna Mtz DPM Podiatry [facility]   12/21/2017 08:30 AM NICK White  Nephrology [address] NEPHROLOGY [facility]       Signatures   Electronically signed by : Ronen Russo DPM; Nov 22 2017 11:11AM EST                       (Author)      Review of Systems      Objective:      Foot ExamPhysical Exam

## 2018-02-21 ENCOUNTER — TELEPHONE (OUTPATIENT)
Dept: FAMILY MEDICINE CLINIC | Facility: CLINIC | Age: 78
End: 2018-02-21

## 2018-02-21 DIAGNOSIS — J44.9 CHRONIC OBSTRUCTIVE PULMONARY DISEASE, UNSPECIFIED COPD TYPE (HCC): Primary | ICD-10-CM

## 2018-02-21 RX ORDER — ALBUTEROL SULFATE 90 UG/1
2 AEROSOL, METERED RESPIRATORY (INHALATION) EVERY 4 HOURS PRN
Status: DISCONTINUED | OUTPATIENT
Start: 2018-02-21 | End: 2018-02-21

## 2018-02-21 RX ORDER — ALBUTEROL SULFATE 90 UG/1
2 AEROSOL, METERED RESPIRATORY (INHALATION) EVERY 6 HOURS PRN
Qty: 1 INHALER | Refills: 6 | Status: SHIPPED | OUTPATIENT
Start: 2018-02-21 | End: 2019-07-22 | Stop reason: SDUPTHER

## 2018-02-21 NOTE — TELEPHONE ENCOUNTER
Please fill Proventil inhaler HFA 90mcg 25 day supply  It is what his insurance will now cover  [facility] in [place]

## 2018-03-12 DIAGNOSIS — N18.30 CHRONIC KIDNEY DISEASE, STAGE III (MODERATE) (HCC): ICD-10-CM

## 2018-03-16 ENCOUNTER — OFFICE VISIT (OUTPATIENT)
Dept: FAMILY MEDICINE CLINIC | Facility: CLINIC | Age: 78
End: 2018-03-16
Payer: MEDICARE

## 2018-03-16 VITALS
RESPIRATION RATE: 22 BRPM | HEIGHT: 72 IN | TEMPERATURE: 98.5 F | SYSTOLIC BLOOD PRESSURE: 136 MMHG | HEART RATE: 82 BPM | WEIGHT: 315 LBS | BODY MASS INDEX: 42.66 KG/M2 | DIASTOLIC BLOOD PRESSURE: 76 MMHG

## 2018-03-16 DIAGNOSIS — M62.838 NECK MUSCLE SPASM: ICD-10-CM

## 2018-03-16 DIAGNOSIS — G89.4 CHRONIC PAIN SYNDROME: Primary | ICD-10-CM

## 2018-03-16 PROCEDURE — 99214 OFFICE O/P EST MOD 30 MIN: CPT | Performed by: FAMILY MEDICINE

## 2018-03-16 RX ORDER — DIAZEPAM 5 MG/1
TABLET ORAL
Qty: 21 TABLET | Refills: 0 | Status: SHIPPED | OUTPATIENT
Start: 2018-03-16 | End: 2018-05-31 | Stop reason: ALTCHOICE

## 2018-03-16 NOTE — PROGRESS NOTES
Assessment/Plan:  Htn/afib stable  Obesity unchanged  JOVI unchanged, stable on CPAP, must use cpap while on any sedatives to reduce risk of worsening, karen with BZD short term use  Wife agreeable with plan also       Diagnoses and all orders for this visit:    Chronic pain syndrome  -     Ambulatory referral to Physical Therapy; Future  -     diazepam (VALIUM) 5 mg tablet; 1 po qhs prn for muscle spasm, avoid use within 8 hrs of tramadol, fall caution    Neck muscle spasm  -     Ambulatory referral to Physical Therapy; Future  -     diazepam (VALIUM) 5 mg tablet; 1 po qhs prn for muscle spasm, avoid use within 8 hrs of tramadol, fall caution        Chronic pain unchanged, caution with tramadol and withdrawal, get from Dr Amy Pena    Afib/PPM stable, we reviewed risks of different muscle relaxers with arrhythmias and interactions with tramadol  BZD risks reviewed for fall and sedation but he can try cautiously short term  PT also ordered  Return if symptoms worsen or fail to improve  Subjective:      Patient ID: Nahed Jj is a 68 y o  male  Chief Complaint   Patient presents with    Neck Pain     denies hurting it  up back of head, started two days ago         HPI  Neck mostly, radiates up to back of head, 2d, no injury, no hx of same, no overuse known, hurts to move karen right and left rotation, some with flexion, bad with extension, no sleep changes, no cough/sneeze, no f/c, arms and fingers ok w/o weakness or incoordination, 1/10 pain, steady pain    Takes tramadol for arthritis 2x/d daily  CAD/htn stable, Dec saw Dr Nitesh Gupta and cardiac testing ok  On xarelto for afib, pacemaker, no palpitations  New lump[ behind ear, few months, no pain or dc or pus noted  Saw Dr Nadeem Mccabe for skin tags      The following portions of the patient's history were reviewed and updated as appropriate: allergies, current medications, past family history, past medical history, past social history, past surgical history and problem list     Review of Systems   Constitutional: Negative for chills and fever  Cardiovascular: Negative for chest pain  Current Outpatient Prescriptions   Medication Sig Dispense Refill    albuterol (VENTOLIN HFA) 90 mcg/act inhaler Inhale 2 puffs every 6 (six) hours as needed for wheezing 1 Inhaler 6    allopurinol (ZYLOPRIM) 100 mg tablet Take 1 tablet by mouth daily 90 tablet 0    ascorbic acid (VITAMIN C) 500 mg tablet Take 500 mg by mouth daily   B Complex Vitamins (B COMPLEX 1 PO) Take 1 tablet by mouth daily   Biotin (BIOTIN 5000) 5 MG CAPS Take 1,000 mcg by mouth daily   calcium carbonate-vitamin D (OSCAL-D) 500 mg-200 units per tablet Take 2 tablets by mouth daily at bedtime   clopidogrel (PLAVIX) 75 mg tablet Take 1 tablet by mouth daily 90 tablet 0    Cranberry 1000 MG CAPS Take 1 tablet by mouth 2 (two) times a day   felodipine (PLENDIL) 5 mg 24 hr tablet Take 1 tablet by mouth daily 90 tablet 0    finasteride (PROSCAR) 5 mg tablet Take 1 tablet by mouth daily 90 tablet 0    Flaxseed, Linseed, (EQL FLAX SEED OIL) 1000 MG CAPS Take by mouth daily   furosemide (LASIX) 40 mg tablet Take 40 mg by mouth daily as needed        Ginkgo Biloba (GINKOBA PO) Take 120 mg by mouth 2 (two) times a day   glucosamine-chondroitin 500-400 MG tablet Take 1 tablet by mouth 2 (two) times a day   lactase (LACTAID) 3,000 units tablet Take 3,000 Units by mouth as needed   Lysine 500 MG CAPS Take 1 tablet by mouth daily   Magnesium 250 MG TABS Take 1 tablet by mouth daily   Omega-3 Fatty Acids (FISH OIL) 1,000 mg Take 1,000 mg by mouth 2 (two) times a day   psyllium (METAMUCIL) 58 6 % powder Take 1 packet by mouth daily Indications: 1 tsp   rivaroxaban (XARELTO) tablet Take 20 mg by mouth        simvastatin (ZOCOR) 20 mg tablet Take 1 tablet by mouth daily at bedtime 90 tablet 0    traMADol (ULTRAM) 50 mg tablet Take 50 mg by mouth every 6 (six) hours as needed for moderate pain Indications: usually takes 2 times a day   zinc gluconate 50 mg tablet Take 50 mg by mouth daily   Cinnamon 500 MG TABS Take 1,000 mg by mouth 2 (two) times a day Indications: 2 tablets 2 times daily   collagenase (SANTYL) ointment Apply 1 application topically daily   diazepam (VALIUM) 5 mg tablet 1 po qhs prn for muscle spasm, avoid use within 8 hrs of tramadol, fall caution 21 tablet 0    multivitamin (THERAGRAN) TABS Take 1 tablet by mouth daily   pregabalin (LYRICA) 150 mg capsule Take 150 mg by mouth 3 (three) times a day   Selenium 200 MCG CAPS Take 1 tablet by mouth daily   solifenacin (VESICARE) 10 MG tablet Take 10 mg by mouth daily   UNABLE TO FIND 100 Units daily  Med Name: vit d3       No current facility-administered medications for this visit  Objective:    /76   Pulse 82   Temp 98 5 °F (36 9 °C)   Resp 22   Ht 6' (1 829 m)   Wt (!) 164 kg (362 lb)   BMI 49 10 kg/m²        Physical Exam   Constitutional: He appears well-developed  HENT:   Head: Normocephalic  Eyes: Conjunctivae are normal    Neck: Neck supple  Cardiovascular: Normal rate and intact distal pulses  Pulmonary/Chest: Effort normal  No respiratory distress  Abdominal: Soft  Musculoskeletal: He exhibits tenderness  He exhibits no edema or deformity  Limited cspine karen b/l rotation to 30 degress   Neurological: He is alert  Skin: Skin is warm and dry  Psychiatric: His behavior is normal  Thought content normal    Nursing note and vitals reviewed        Left posterior ear EIC       Teresa Funes, DO

## 2018-03-26 LAB
BACTERIA UR QL AUTO: ABNORMAL /HPF
BILIRUB UR QL STRIP: NEGATIVE
CREAT ?TM UR-SCNC: 217.5 UMOL/L
EXT PROTEIN URINE: 50.8
EXTERNAL MAGNESIUM: 2.4
EXTERNAL PHOSPHORUS: 3.5
EXTERNAL PTH: 21
EXTERNAL VITAMIN D,25: 36
GLUCOSE UR STRIP-MCNC: NEGATIVE MG/DL
HCT VFR BLD AUTO: 47.2 % (ref 36.5–49.3)
HGB BLD-MCNC: 15.6 G/DL (ref 12–17)
HGB UR QL STRIP.AUTO: ABNORMAL
KETONES UR STRIP-MCNC: NEGATIVE MG/DL
NON-SQ EPI CELLS URNS QL MICRO: ABNORMAL /HPF
PH UR STRIP.AUTO: 6 [PH] (ref 4.5–8)
PLATELET # BLD AUTO: 194 THOUSANDS/UL (ref 149–390)
PROT/CREAT UR: 234 MG/G{CREAT}
RBC #/AREA URNS AUTO: ABNORMAL /HPF
SP GR UR STRIP.AUTO: 1.03 (ref 1–1.03)
UROBILINOGEN UR QL STRIP.AUTO: 0.2 E.U./DL
WBC # BLD AUTO: 8.4 THOUSAND/UL
WBC #/AREA URNS AUTO: ABNORMAL /HPF

## 2018-03-27 ENCOUNTER — TELEPHONE (OUTPATIENT)
Dept: FAMILY MEDICINE CLINIC | Facility: CLINIC | Age: 78
End: 2018-03-27

## 2018-03-27 DIAGNOSIS — F06.4 ANXIETY DISORDER DUE TO MEDICAL CONDITION: Primary | ICD-10-CM

## 2018-03-27 RX ORDER — PAROXETINE HYDROCHLORIDE 20 MG/1
10 TABLET, FILM COATED ORAL DAILY
Qty: 30 TABLET | Refills: 0 | Status: SHIPPED | OUTPATIENT
Start: 2018-03-27 | End: 2018-04-01 | Stop reason: SDUPTHER

## 2018-03-27 NOTE — TELEPHONE ENCOUNTER
Pts wife called for a refill on Paroxetine HCl 20mg 1 every morning (per [employer])  They need 30 days worth sent to [facility] in [place] and 90 days sent to [facility]

## 2018-04-01 DIAGNOSIS — F06.4 ANXIETY DISORDER DUE TO MEDICAL CONDITION: ICD-10-CM

## 2018-04-01 RX ORDER — PAROXETINE HYDROCHLORIDE 20 MG/1
10 TABLET, FILM COATED ORAL DAILY
Qty: 90 TABLET | Refills: 3 | Status: SHIPPED | OUTPATIENT
Start: 2018-04-01 | End: 2018-05-31 | Stop reason: SDUPTHER

## 2018-04-05 ENCOUNTER — OFFICE VISIT (OUTPATIENT)
Dept: NEPHROLOGY | Facility: CLINIC | Age: 78
End: 2018-04-05
Payer: MEDICARE

## 2018-04-05 VITALS
WEIGHT: 315 LBS | DIASTOLIC BLOOD PRESSURE: 72 MMHG | HEIGHT: 72 IN | SYSTOLIC BLOOD PRESSURE: 128 MMHG | BODY MASS INDEX: 42.66 KG/M2

## 2018-04-05 DIAGNOSIS — IMO0001 CLASS 3 OBESITY WITH SERIOUS COMORBIDITY AND BODY MASS INDEX (BMI) OF 45.0 TO 49.9 IN ADULT, UNSPECIFIED OBESITY TYPE: ICD-10-CM

## 2018-04-05 DIAGNOSIS — I12.9 BENIGN HYPERTENSION WITH CKD (CHRONIC KIDNEY DISEASE) STAGE III (HCC): ICD-10-CM

## 2018-04-05 DIAGNOSIS — R31.29 MICROSCOPIC HEMATURIA: ICD-10-CM

## 2018-04-05 DIAGNOSIS — N18.30 BENIGN HYPERTENSION WITH CKD (CHRONIC KIDNEY DISEASE) STAGE III (HCC): ICD-10-CM

## 2018-04-05 DIAGNOSIS — N18.30 CKD (CHRONIC KIDNEY DISEASE), STAGE III (HCC): Primary | ICD-10-CM

## 2018-04-05 DIAGNOSIS — R80.1 PERSISTENT PROTEINURIA: ICD-10-CM

## 2018-04-05 DIAGNOSIS — R60.0 BILATERAL EDEMA OF LOWER EXTREMITY: ICD-10-CM

## 2018-04-05 PROCEDURE — 99214 OFFICE O/P EST MOD 30 MIN: CPT | Performed by: INTERNAL MEDICINE

## 2018-04-05 NOTE — LETTER
April 5, 2018     Damian Momin MD  [id number]  [id number]    Patient: Jacky Holguin   YOB: 1940   Date of Visit: 4/5/2018       Dear Dr Timothy Buckner:    Thank you for referring Cadeamber Peterslillian to me for evaluation  Below are my notes for this consultation  If you have questions, please do not hesitate to call me  I look forward to following your patient along with you  Sincerely,        Sami Wilkerson MD        CC: No Recipients  Sami Wilkerson MD  4/5/2018 10:50 AM  Sign at close encounter  [address]   Jacky Holguin 68 y o  male MRN: [id number]  DATE: 4/5/2018  Reason for visit:   Chief Complaint   Patient presents with    Follow-up    Chronic Kidney Disease     ASSESSMENT and PLAN:  CKD stage III, baseline serum creatinine 1 2-1 5   - Last serum creatinine 1 2 overall stable and at baseline     - Urinalysis in March 2018 shows leukocyte esterase positive, 1+ proteinuria, 11 to 30 WBCs, 11 to 30 RBCs, many bacteria  Patient denies any urinary symptoms, no fever or chills  This may represent asymptomatic bacteriuria  No antibiotic at this time  - CKD likely secondary to long-term hypertension causing hypertensive nephrosclerosis, morbid obesity   - Avoid nephrotoxins or NSAIDs  Proteinuria, last UPC ratio 234 mg this is likely in the setting of suspected UTI/asymptomatic bacteriuria  - repeat urinalysi and UPC ratio before next visit     -Could be secondary to underlying hypertension +/- obesity related secondary FSGS component   - Consider ACE inhibitor if proteinuria gets worse  Microscopic hematuria: likely in the setting of asymptomatic bacteriuria  - Will repeat UA with micro before next visit  - no intervention at this time  Monitor for now  Hypertension  - blood pressure controlled in the office today  He takes Lasix as needed basis       - Salt restricted diet   - continue felodipine       Leg edema, overall stable and improved  Continue Lasix as needed basis  Weight overall stable  Hyperkalemia  - resolved  K 4 8  - strict low-potassium diet      repeat BMP before next visit  Slightly elevated magnesium level at 2 4  Discontinue magnesium supplement  Vitamin-D 25 hydroxy level 36 at goal , PTH 21 in March 2018  Morbid obesity, patient has been trying to watch his diet  Will give referral to weight loss management program with Bariatric Medicine  He is not interested in getting evaluation for bariatric surgery  Diagnoses and all orders for this visit:    CKD (chronic kidney disease), stage III  -     Basic metabolic panel; Future  -     Phosphorus; Future  -     Magnesium; Future  -     Protein / creatinine ratio, urine; Future  -     Urinalysis with reflex to microscopic; Future  -     CBC; Future  -     Basic metabolic panel  -     Phosphorus  -     Magnesium  -     Protein / creatinine ratio, urine  -     Urinalysis with reflex to microscopic  -     CBC    Hyperkalemia    Microscopic hematuria    Persistent proteinuria    Bilateral edema of lower extremity    Benign hypertension with CKD (chronic kidney disease) stage III    Class 3 obesity with serious comorbidity and body mass index (BMI) of 45 0 to 49 9 in adult, unspecified obesity type ([facility] [address] )  -     Ambulatory referral to Weight Management; Future          SUBJECTIVE / HPI:  Patient is 27-year-old male with significant medical issues of hypertension for many years, CAD, status post PTCA, status post pacemaker placement, CK D stage III with baseline creatinine in the 1 2-1 5,, gout, comes for regular follow-up of CKD  Last serum creatinine 1 2 overall at baseline  Since last visit, patient denies any new urinary complaint  He denies any worsening dyspnea  Patient has chronic intermittent exertional dyspnea which is overall stable  He has occasional wheezing episodes and takes inhaler   Patient is unable to do exercise due to knee pain issues but tries to walk  He denies any fever or chills  Leg edema is overall stable  No recent gout attack  No recent NSAID exposure  REVIEW OF SYSTEMS:    Review of Systems   Constitutional: Negative for chills, fatigue and fever  HENT: Negative for congestion, ear pain and postnasal drip  Eyes: Negative for redness and visual disturbance  Respiratory: Positive for shortness of breath (chronic exertional dyspnea but stable)  Negative for cough  Cardiovascular: Positive for leg swelling (very trace and stable)  Negative for chest pain  Gastrointestinal: Negative for abdominal pain, diarrhea, nausea and vomiting  Endocrine: Negative for polyuria  Genitourinary: Negative for decreased urine volume, difficulty urinating, dysuria, frequency, hematuria and urgency  Musculoskeletal: Positive for arthralgias  Negative for back pain  Skin: Negative for rash  Neurological: Negative for dizziness, light-headedness and headaches  Hematological: Does not bruise/bleed easily  Psychiatric/Behavioral: Negative for confusion  The patient is not nervous/anxious  More than 10 point review of systems were obtained and discussed in length with the patient  Complete review of systems were negative / unremarkable except mentioned above  PHYSICAL EXAM:  Vitals:    04/05/18 1009 04/05/18 1047   BP:  128/72   Weight: (!) 164 kg (361 lb)    Height: 6' (1 829 m)      Body mass index is 48 96 kg/m²  Physical Exam   Constitutional: He is oriented to person, place, and time  He appears well-developed and well-nourished  HENT:   Head: Normocephalic and atraumatic  Right Ear: External ear normal    Left Ear: External ear normal    Nose: Nose normal    Eyes: Conjunctivae and EOM are normal  Pupils are equal, round, and reactive to light  No scleral icterus  Neck: Neck supple  No JVD present  Cardiovascular: Normal rate and normal heart sounds      Pulmonary/Chest: Effort normal and breath sounds normal  No respiratory distress  He has no wheezes  He has no rales  Abdominal: Soft  Bowel sounds are normal  He exhibits distension (which is chronic and related to obesity)  There is no tenderness  Musculoskeletal: He exhibits edema (very trace and stable in both legs)  He exhibits no tenderness  Neurological: He is alert and oriented to person, place, and time  Skin: Skin is warm and dry  Psychiatric: He has a normal mood and affect  His behavior is normal    Vitals reviewed  PAST MEDICAL HISTORY:  Past Medical History:   Diagnosis Date    Arthritis     Chronic kidney disease     stage 3    Coronary artery disease     2 stents    Fluid retention     Gout     Heart failure ([address] )     pacemaker    Hypertension     Pacemaker     Pulmonary emphysema (HCC)     Shortness of breath     exertion    Sleep apnea     c pap       PAST SURGICAL HISTORY:  Past Surgical History:   Procedure Laterality Date    ANGIOPLASTY      x2 2 stents and then replaced    CARDIAC SURGERY      pacemaker    CHOLECYSTECTOMY      EPIDURAL BLOCK INJECTION N/A 5/26/2016    Procedure: BLOCK / INJECTION EPIDURAL STEROID LUMBAR  L4-5;  Surgeon: Heather Sepulveda MD;  Location: [place] [address] MAIN OR;  Service:     EYE SURGERY      cataract left    KNEE ARTHROSCOPY W/ MENISCAL REPAIR Left     LUMBAR EPIDURAL INJECTION N/A 3/17/2016    Procedure: BLOCK / INJECTION LUMBAR  L4-5  (C-ARM);   Surgeon: Heather Sepulveda MD;  Location: [facility] MAIN OR;  Service:        SOCIAL HISTORY:  History   Alcohol Use No     History   Drug Use No     History   Smoking Status    Former Smoker    Packs/day: 1 00    Years: 50 00    Quit date: 2015   Smokeless Tobacco    Never Used       FAMILY HISTORY:  Family History   Problem Relation Age of Onset    No Known Problems Mother     Heart disease Father        MEDICATIONS:    Current Outpatient Prescriptions:     albuterol (VENTOLIN HFA) 90 mcg/act inhaler, Inhale 2 puffs every 6 (six) hours as needed for wheezing, Disp: 1 Inhaler, Rfl: 6    allopurinol (ZYLOPRIM) 100 mg tablet, Take 1 tablet by mouth daily, Disp: 90 tablet, Rfl: 0    ascorbic acid (VITAMIN C) 500 mg tablet, Take 500 mg by mouth daily  , Disp: , Rfl:     B Complex Vitamins (B COMPLEX 1 PO), Take 1 tablet by mouth daily  , Disp: , Rfl:     Biotin (BIOTIN 5000) 5 MG CAPS, Take 1,000 mcg by mouth daily  , Disp: , Rfl:     calcium carbonate-vitamin D (OSCAL-D) 500 mg-200 units per tablet, Take 2 tablets by mouth daily at bedtime  , Disp: , Rfl:     clopidogrel (PLAVIX) 75 mg tablet, Take 1 tablet by mouth daily, Disp: 90 tablet, Rfl: 0    collagenase (SANTYL) ointment, Apply 1 application topically daily  , Disp: , Rfl:     Cranberry 1000 MG CAPS, Take 1 tablet by mouth 2 (two) times a day , Disp: , Rfl:     diazepam (VALIUM) 5 mg tablet, 1 po qhs prn for muscle spasm, avoid use within 8 hrs of tramadol, fall caution, Disp: 21 tablet, Rfl: 0    felodipine (PLENDIL) 5 mg 24 hr tablet, Take 1 tablet by mouth daily, Disp: 90 tablet, Rfl: 0    finasteride (PROSCAR) 5 mg tablet, Take 1 tablet by mouth daily, Disp: 90 tablet, Rfl: 0    Flaxseed, Linseed, (EQL FLAX SEED OIL) 1000 MG CAPS, Take by mouth daily  , Disp: , Rfl:     furosemide (LASIX) 40 mg tablet, Take 40 mg by mouth daily as needed  , Disp: , Rfl:     glucosamine-chondroitin 500-400 MG tablet, Take 1 tablet by mouth 2 (two) times a day , Disp: , Rfl:     lactase (LACTAID) 3,000 units tablet, Take 3,000 Units by mouth as needed  , Disp: , Rfl:     Lysine 500 MG CAPS, Take 1 tablet by mouth daily  , Disp: , Rfl:     Omega-3 Fatty Acids (FISH OIL) 1,000 mg, Take 1,000 mg by mouth 2 (two) times a day , Disp: , Rfl:     PARoxetine (PAXIL) 20 mg tablet, Take 0 5 tablets (10 mg total) by mouth daily, Disp: 90 tablet, Rfl: 3    psyllium (METAMUCIL) 58 6 % powder, Take 1 packet by mouth daily Indications: 1 tsp , Disp: , Rfl:     rivaroxaban (XARELTO) tablet, Take 20 mg by mouth , Disp: , Rfl:     simvastatin (ZOCOR) 20 mg tablet, Take 1 tablet by mouth daily at bedtime, Disp: 90 tablet, Rfl: 0    solifenacin (VESICARE) 10 MG tablet, Take 10 mg by mouth daily  , Disp: , Rfl:     UNABLE TO FIND, 100 Units daily   Med Name: vit d3, Disp: , Rfl:     Lab Results:   Results for orders placed or performed in visit on 03/26/18   Urinalysis with reflex to microscopic   Result Value Ref Range    Specific [place], UA 1 027 1 003 - 1 030    pH, UA 6 0 4 5 - 8 0    WBC, UA 1-2 (A) None Seen, 0-5, 5-55, 5-65 /hpf    Glucose, UA Negative Negative mg/dl    Ketones, UA Negative Negative mg/dl    Blood, UA 1+     Bilirubin, UA Negative Negative    Urobilinogen, UA 0 2 0 2, 1 0 E U /dl E U /dl    RBC, UA 10-20 (A) None Seen, 0-5 /hpf    Epithelial Cells Moderate (A) None Seen, Occasional /hpf    Bacteria, UA Moderate (A) None Seen, Occasional /hpf   CBC   Result Value Ref Range    WBC 8 40 Thousand/uL    Hemoglobin 15 6 12 0 - 17 0 g/dL    Hematocrit 47 2 36 5 - 49 3 %    Platelets 773 960 - 626 Thousands/uL   Protein / creatinine ratio, urine   Result Value Ref Range    EXTERNAL PROTEIN URINE 50 8     EXT Creatinine Urine 217 5     EXTERNAL Ur Prot/Creat Ratio 234    Vitamin D 25 hydroxy   Result Value Ref Range    EXTERNAL VITAMIN D,25 36 0    Phosphorus   Result Value Ref Range    EXTERNAL PHOSPHORUS 3 5    Magnesium   Result Value Ref Range    EXTERNAL MAGNESIUM 2 4    PTH, intact   Result Value Ref Range    EXTERNAL PTH 21

## 2018-04-05 NOTE — PROGRESS NOTES
NEPHROLOGY OUTPATIENT PROGRESS NOTE   Tammy Kim 68 y o  male MRN: [id number]  DATE: 4/5/2018  Reason for visit:   Chief Complaint   Patient presents with    Follow-up    Chronic Kidney Disease     ASSESSMENT and PLAN:  CKD stage III, baseline serum creatinine 1 2-1 5   - Last serum creatinine 1 2 overall stable and at baseline     - Urinalysis in March 2018 shows leukocyte esterase positive, 1+ proteinuria, 11 to 30 WBCs, 11 to 30 RBCs, many bacteria  Patient denies any urinary symptoms, no fever or chills  This may represent asymptomatic bacteriuria  No antibiotic at this time  - CKD likely secondary to long-term hypertension causing hypertensive nephrosclerosis, morbid obesity   - Avoid nephrotoxins or NSAIDs  Proteinuria, last UPC ratio 234 mg this is likely in the setting of suspected UTI/asymptomatic bacteriuria  - repeat urinalysi and UPC ratio before next visit     -Could be secondary to underlying hypertension +/- obesity related secondary FSGS component   - Consider ACE inhibitor if proteinuria gets worse  Microscopic hematuria: likely in the setting of asymptomatic bacteriuria  - Will repeat UA with micro before next visit  - no intervention at this time  Monitor for now  Hypertension  - blood pressure controlled in the office today  He takes Lasix as needed basis       - Salt restricted diet   - continue felodipine  Leg edema, overall stable and improved  Continue Lasix as needed basis  Weight overall stable  Hyperkalemia  - resolved  K 4 8  - strict low-potassium diet      repeat BMP before next visit  Slightly elevated magnesium level at 2 4  Discontinue magnesium supplement  Vitamin-D 25 hydroxy level 36 at goal , PTH 21 in March 2018  Morbid obesity, patient has been trying to watch his diet  Will give referral to weight loss management program with Bariatric Medicine  He is not interested in getting evaluation for bariatric surgery      Diagnoses and all orders for this visit:    CKD (chronic kidney disease), stage III  -     Basic metabolic panel; Future  -     Phosphorus; Future  -     Magnesium; Future  -     Protein / creatinine ratio, urine; Future  -     Urinalysis with reflex to microscopic; Future  -     CBC; Future  -     Basic metabolic panel  -     Phosphorus  -     Magnesium  -     Protein / creatinine ratio, urine  -     Urinalysis with reflex to microscopic  -     CBC    Hyperkalemia    Microscopic hematuria    Persistent proteinuria    Bilateral edema of lower extremity    Benign hypertension with CKD (chronic kidney disease) stage III    Class 3 obesity with serious comorbidity and body mass index (BMI) of 45 0 to 49 9 in adult, unspecified obesity type ([facility] [address] )  -     Ambulatory referral to Weight Management; Future          SUBJECTIVE / HPI:  Patient is 42-year-old male with significant medical issues of hypertension for many years, CAD, status post PTCA, status post pacemaker placement, CK D stage III with baseline creatinine in the 1 2-1 5,, gout, comes for regular follow-up of CKD  Last serum creatinine 1 2 overall at baseline  Since last visit, patient denies any new urinary complaint  He denies any worsening dyspnea  Patient has chronic intermittent exertional dyspnea which is overall stable  He has occasional wheezing episodes and takes inhaler  Patient is unable to do exercise due to knee pain issues but tries to walk  He denies any fever or chills  Leg edema is overall stable  No recent gout attack  No recent NSAID exposure  REVIEW OF SYSTEMS:    Review of Systems   Constitutional: Negative for chills, fatigue and fever  HENT: Negative for congestion, ear pain and postnasal drip  Eyes: Negative for redness and visual disturbance  Respiratory: Positive for shortness of breath (chronic exertional dyspnea but stable)  Negative for cough  Cardiovascular: Positive for leg swelling (very trace and stable)  Negative for chest pain  Gastrointestinal: Negative for abdominal pain, diarrhea, nausea and vomiting  Endocrine: Negative for polyuria  Genitourinary: Negative for decreased urine volume, difficulty urinating, dysuria, frequency, hematuria and urgency  Musculoskeletal: Positive for arthralgias  Negative for back pain  Skin: Negative for rash  Neurological: Negative for dizziness, light-headedness and headaches  Hematological: Does not bruise/bleed easily  Psychiatric/Behavioral: Negative for confusion  The patient is not nervous/anxious  More than 10 point review of systems were obtained and discussed in length with the patient  Complete review of systems were negative / unremarkable except mentioned above  PHYSICAL EXAM:  Vitals:    04/05/18 1009 04/05/18 1047   BP:  128/72   Weight: (!) 164 kg (361 lb)    Height: 6' (1 829 m)      Body mass index is 48 96 kg/m²  Physical Exam   Constitutional: He is oriented to person, place, and time  He appears well-developed and well-nourished  HENT:   Head: Normocephalic and atraumatic  Right Ear: External ear normal    Left Ear: External ear normal    Nose: Nose normal    Eyes: Conjunctivae and EOM are normal  Pupils are equal, round, and reactive to light  No scleral icterus  Neck: Neck supple  No JVD present  Cardiovascular: Normal rate and normal heart sounds  Pulmonary/Chest: Effort normal and breath sounds normal  No respiratory distress  He has no wheezes  He has no rales  Abdominal: Soft  Bowel sounds are normal  He exhibits distension (which is chronic and related to obesity)  There is no tenderness  Musculoskeletal: He exhibits edema (very trace and stable in both legs)  He exhibits no tenderness  Neurological: He is alert and oriented to person, place, and time  Skin: Skin is warm and dry  Psychiatric: He has a normal mood and affect  His behavior is normal    Vitals reviewed        PAST MEDICAL HISTORY:  Past Medical History:   Diagnosis Date    Arthritis     Chronic kidney disease     stage 3    Coronary artery disease     2 stents    Fluid retention     Gout     Heart failure (HCC)     pacemaker    Hypertension     Pacemaker     Pulmonary emphysema (HCC)     Shortness of breath     exertion    Sleep apnea     c pap       PAST SURGICAL HISTORY:  Past Surgical History:   Procedure Laterality Date    ANGIOPLASTY      x2 2 stents and then replaced    CARDIAC SURGERY      pacemaker    CHOLECYSTECTOMY      EPIDURAL BLOCK INJECTION N/A 5/26/2016    Procedure: BLOCK / INJECTION EPIDURAL STEROID LUMBAR  L4-5;  Surgeon: Carolyn Fernandez MD;  Location: [facility] MAIN OR;  Service:     EYE SURGERY      cataract left    KNEE ARTHROSCOPY W/ MENISCAL REPAIR Left     LUMBAR EPIDURAL INJECTION N/A 3/17/2016    Procedure: BLOCK / INJECTION LUMBAR  L4-5  (C-ARM); Surgeon: Carolyn Fernandez MD;  Location: [facility] MAIN OR;  Service:        SOCIAL HISTORY:  History   Alcohol Use No     History   Drug Use No     History   Smoking Status    Former Smoker    Packs/day: 1 00    Years: 50 00    Quit date: 2015   Smokeless Tobacco    Never Used       FAMILY HISTORY:  Family History   Problem Relation Age of Onset    No Known Problems Mother     Heart disease Father        MEDICATIONS:    Current Outpatient Prescriptions:     albuterol (VENTOLIN HFA) 90 mcg/act inhaler, Inhale 2 puffs every 6 (six) hours as needed for wheezing, Disp: 1 Inhaler, Rfl: 6    allopurinol (ZYLOPRIM) 100 mg tablet, Take 1 tablet by mouth daily, Disp: 90 tablet, Rfl: 0    ascorbic acid (VITAMIN C) 500 mg tablet, Take 500 mg by mouth daily  , Disp: , Rfl:     B Complex Vitamins (B COMPLEX 1 PO), Take 1 tablet by mouth daily  , Disp: , Rfl:     Biotin (BIOTIN 5000) 5 MG CAPS, Take 1,000 mcg by mouth daily  , Disp: , Rfl:     calcium carbonate-vitamin D (OSCAL-D) 500 mg-200 units per tablet, Take 2 tablets by mouth daily at bedtime  , Disp: , Rfl:     clopidogrel (PLAVIX) 75 mg tablet, Take 1 tablet by mouth daily, Disp: 90 tablet, Rfl: 0    collagenase (SANTYL) ointment, Apply 1 application topically daily  , Disp: , Rfl:     Cranberry 1000 MG CAPS, Take 1 tablet by mouth 2 (two) times a day , Disp: , Rfl:     diazepam (VALIUM) 5 mg tablet, 1 po qhs prn for muscle spasm, avoid use within 8 hrs of tramadol, fall caution, Disp: 21 tablet, Rfl: 0    felodipine (PLENDIL) 5 mg 24 hr tablet, Take 1 tablet by mouth daily, Disp: 90 tablet, Rfl: 0    finasteride (PROSCAR) 5 mg tablet, Take 1 tablet by mouth daily, Disp: 90 tablet, Rfl: 0    Flaxseed, Linseed, (EQL FLAX SEED OIL) 1000 MG CAPS, Take by mouth daily  , Disp: , Rfl:     furosemide (LASIX) 40 mg tablet, Take 40 mg by mouth daily as needed  , Disp: , Rfl:     glucosamine-chondroitin 500-400 MG tablet, Take 1 tablet by mouth 2 (two) times a day , Disp: , Rfl:     lactase (LACTAID) 3,000 units tablet, Take 3,000 Units by mouth as needed  , Disp: , Rfl:     Lysine 500 MG CAPS, Take 1 tablet by mouth daily  , Disp: , Rfl:     Omega-3 Fatty Acids (FISH OIL) 1,000 mg, Take 1,000 mg by mouth 2 (two) times a day , Disp: , Rfl:     PARoxetine (PAXIL) 20 mg tablet, Take 0 5 tablets (10 mg total) by mouth daily, Disp: 90 tablet, Rfl: 3    psyllium (METAMUCIL) 58 6 % powder, Take 1 packet by mouth daily Indications: 1 tsp , Disp: , Rfl:     rivaroxaban (XARELTO) tablet, Take 20 mg by mouth , Disp: , Rfl:     simvastatin (ZOCOR) 20 mg tablet, Take 1 tablet by mouth daily at bedtime, Disp: 90 tablet, Rfl: 0    solifenacin (VESICARE) 10 MG tablet, Take 10 mg by mouth daily  , Disp: , Rfl:     UNABLE TO FIND, 100 Units daily   Med Name: vit d3, Disp: , Rfl:     Lab Results:   Results for orders placed or performed in visit on 03/26/18   Urinalysis with reflex to microscopic   Result Value Ref Range    Specific [place], UA 1 027 1 003 - 1 030    pH, UA 6 0 4 5 - 8 0    WBC, UA 1-2 (A) None Seen, 0-5, 5-55, 5-65 /hpf    Glucose, UA Negative Negative mg/dl    Ketones, UA Negative Negative mg/dl    Blood, UA 1+     Bilirubin, UA Negative Negative    Urobilinogen, UA 0 2 0 2, 1 0 E U /dl E U /dl    RBC, UA 10-20 (A) None Seen, 0-5 /hpf    Epithelial Cells Moderate (A) None Seen, Occasional /hpf    Bacteria, UA Moderate (A) None Seen, Occasional /hpf   CBC   Result Value Ref Range    WBC 8 40 Thousand/uL    Hemoglobin 15 6 12 0 - 17 0 g/dL    Hematocrit 47 2 36 5 - 49 3 %    Platelets 553 379 - 719 Thousands/uL   Protein / creatinine ratio, urine   Result Value Ref Range    EXTERNAL PROTEIN URINE 50 8     EXT Creatinine Urine 217 5     EXTERNAL Ur Prot/Creat Ratio 234    Vitamin D 25 hydroxy   Result Value Ref Range    EXTERNAL VITAMIN D,25 36 0    Phosphorus   Result Value Ref Range    EXTERNAL PHOSPHORUS 3 5    Magnesium   Result Value Ref Range    EXTERNAL MAGNESIUM 2 4    PTH, intact   Result Value Ref Range    EXTERNAL PTH 21

## 2018-04-17 ENCOUNTER — OFFICE VISIT (OUTPATIENT)
Dept: PODIATRY | Facility: CLINIC | Age: 78
End: 2018-04-17
Payer: MEDICARE

## 2018-04-17 VITALS — BODY MASS INDEX: 42.66 KG/M2 | HEART RATE: 82 BPM | HEIGHT: 72 IN | RESPIRATION RATE: 17 BRPM | WEIGHT: 315 LBS

## 2018-04-17 DIAGNOSIS — M79.671 PAIN IN BOTH FEET: ICD-10-CM

## 2018-04-17 DIAGNOSIS — I70.209 PERIPHERAL ARTERIOSCLEROSIS (HCC): ICD-10-CM

## 2018-04-17 DIAGNOSIS — B35.9 DERMATOPHYTOSIS: ICD-10-CM

## 2018-04-17 DIAGNOSIS — M79.672 PAIN IN BOTH FEET: ICD-10-CM

## 2018-04-17 DIAGNOSIS — M25.571 PAIN IN JOINTS OF BOTH FEET: ICD-10-CM

## 2018-04-17 DIAGNOSIS — M25.572 PAIN IN JOINTS OF BOTH FEET: ICD-10-CM

## 2018-04-17 DIAGNOSIS — M21.969 ACQUIRED DEFORMITY OF FOOT, UNSPECIFIED LATERALITY: Primary | ICD-10-CM

## 2018-04-17 DIAGNOSIS — M54.16 RADICULOPATHY, LUMBAR REGION: ICD-10-CM

## 2018-04-17 PROCEDURE — 99213 OFFICE O/P EST LOW 20 MIN: CPT | Performed by: PODIATRIST

## 2018-04-17 RX ORDER — GABAPENTIN 100 MG/1
100 CAPSULE ORAL 3 TIMES DAILY
Qty: 90 CAPSULE | Refills: 0 | Status: SHIPPED | OUTPATIENT
Start: 2018-04-17 | End: 2018-10-11 | Stop reason: SDUPTHER

## 2018-04-17 NOTE — PROGRESS NOTES
Assessment/Plan:    No problem-specific Assessment & Plan notes found for this encounter      Acquired deformity of foot, unspecified laterality     Pain in joints of both feet     Dermatophytosis     Peripheral arteriosclerosis (HCC)     Pain in both feet     Onychomycosis         Subjective:       Patient ID: Tamie Miranda is a 68 y o  male      HPI     The following portions of the patient's history were reviewed and updated as appropriate: allergies, current medications, past family history, past medical history, past social history, past surgical history and problem listAssessment  1  Acquired ankle/foot deformity (736 70) (M21 969)   2  Onychomycosis (110 1) (B35 1)   3  Difficulty in walking (719 7) (R26 2)     Plan      · Follow-up visit in 9 weeks Evaluation and Treatment  Follow-up  Status: Hold For -Scheduling  Requested for: 09SWN1012   · There are many things you can do at home to ensure good foot health ; Status:Complete;  Done: 59GZX0359 11:09AM   · Wear shoes that give your toes plenty of room ; Status:Complete;   Done: 21NJQ118730:67XO     Discussion/Summary  The patient, patient's family was counseled regarding instructions for management,-- patient and family education  Patient is able to Self-Care  The treatment plan was reviewed with the patient/guardian  The patient/guardian understands and agrees with the treatment plan      Chief Complaint  Patient has pain in both feet  History of Present Illness  HPI: Agent has continued bilateral lower extremity pain  He is taking Lyrica  He takes Percocet when necessary  He has back pain  His legs feel heavy and weak  Review of Systems   Constitutional: no fever-- and-- no chills  ENT: no nasal discharge  Cardiovascular: no palpitations  Gastrointestinal: no constipation  Genitourinary: no urinary hesitancy  Integumentary: no rashes  Neurological: no headache-- and-- no dizziness  Other Symptoms: anxiety insomnia        Active Problems     1  Abrasion (919 0) (T14 8XXA)   2  Acquired ankle/foot deformity (736 70) (M21 969)   3  Acute gout (274 01) (M10 9)   4  Acute gouty arthritis (274 01) (M10 9)   5  Allergic rhinitis (477 9) (J30 9)   6  Aneurysm of abdominal aorta (441 4) (I71 4)   7  Angina pectoris (413 9) (I20 9)   8  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) (F06 4)   9  Arteriosclerosis of coronary artery (414 00) (I25 10)   10  Arthralgia of ankle or foot (719 47) (M25 579)   11  Atherosclerosis of arteries of extremities (440 20) (I70 209)   12  Atrial fibrillation, unspecified type (427 31) (I48 91)   13  Atrial flutter (427 32) (I48 92)   14  Benign hypertension with CKD (chronic kidney disease) stage III (403 10,585 3)  (I12 9,N18 3)   15  Benign prostatic hypertrophy (600 00) (N40 0)   16  Bilateral edema of lower extremity (782 3) (R60 0)   17  Bilateral lumbar radiculopathy (724 4) (M54 16)   18  Blurred vision (368 8) (H53 8)   19  Body mass index (BMI) of 45 0 to 49 9 in adult (V85 42) (Z68 42)   20  Callus (700) (L84)   21  Carpal tunnel syndrome, unspecified laterality (354 0) (G56 00)   22  Cellulitis (682 9) (L03 90)   23  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)   24  Chronic low back pain (724 2,338 29) (M54 5,G89 29)   25  Chronic obstructive pulmonary disease (496) (J44 9)   26  Chronic pain syndrome (338 4) (G89 4)   27  CKD (chronic kidney disease), stage III (585 3) (N18 3)   28  Colon cancer screening (V76 51) (Z12 11)   29  Colon, diverticulosis (562 10) (K57 30)   30  Congestive heart failure (428 0) (I50 9)   31  Coronary artery disease (414 00) (I25 10)   32  Deep venous insufficiency (459 81) (I87 2)   33  Degenerative disc disease, lumbar (722 52) (M51 36)   34  Difficulty in walking (719 7) (R26 2)   35  Encounter for special screening examination for genitourinary disorder (V81 6) (Z13 89)   36  Fecal soiling (787 62) (R15 1)   37  Fibromyalgia (729 1) (M79 7)   38   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  1) (K60 3)   39  Gastrointestinal symptoms (787 99) (R19 8)   40  Gout (274 9) (M10 9)   41  Hyperkalemia (276 7) (E87 5)   42  Hyperlipidemia (272 4) (E78 5)   43  Insomnia, persistent (307 42) (G47 00)   44  Lumbar canal stenosis (724 02) (M48 061)   45  Lumbar neuritis (724 4) (M54 16)   46  Memory loss (780 93) (R41 3)   47  Microscopic hematuria (599 72) (R31 29)   48  Moderate or severe vision impairment, one eye (369 60) (H54 40)   49  Morbid or severe obesity due to excess calories (278 01) (E66 01)   50  Morbid or severe obesity due to excess calories (278 01) (E66 01)   51  Needs flu shot (V04 81) (Z23)   52  Nicotine dependence (305 1) (F17 200)   53  Olecranon bursitis (726 33) (M70 20)   54  Onychogryphosis (703 8)   55  Onychomycosis (110 1) (B35 1)   56  Osteoarthritis, knee/lower leg (715 96) (M17 9)   57  Pacemaker Permanent Placement Dual-Chamber   58  Pain in joints of both feet (719 47) (M79 671,M79 672)   59  Paronychia of toe, unspecified laterality (681 11) (L03 039)   60  Pes planus, congenital (754 61) (Q66 50)   61  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)   62  Presence of cardiac pacemaker (V45 01) (Z95 0)   63  Proteinuria (791 0) (R80 9)   64  Radiculopathy (729 2) (M54 10)   65  Screening for genitourinary condition (V81 6) (Z13 89)   66  Skin tear of right lower leg without complication, initial encounter (891 0) (S81 811A)   67  Sleep apnea (780 57) (G47 30)   68  Sleep apnea (780 57) (G47 30)   69  Tenderness in limb (729 5) (M79 609)   70  Tendonitis (726 90) (M77 9)   71  Tinea corporis (110 5) (B35 4)   72  Urinary incontinence (788 30) (R32)   73   Venous insufficiency (chronic) (peripheral) (459 81) (I87 2)     Past Medical History   · History of Atrial flutter (427 32) (I48 92)   · History of Cellulitis of foot (682 7) (L03 119)   · History of Colon cancer screening (V76 51) (Z12 11)   · History of Difficulty in walking (719 7) (R26 2)   · History of Difficulty in walking (719  7) (R26 2)   · General medical examination (V70 9) (Z00 00)   · History of backache (V13 59) (Z87 39)   · History of radiculopathy (V12 49) (Z86 69)   · 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)     The active problems and past medical history were reviewed and updated today  Surgical History   · History of Cath Stent Placement   · History of Cholecystectomy   · History of Knee Surgery   · Pacemaker Permanent Placement Dual-Chamber   · History of Permanent Pacemaker Implantation Date     The surgical history was reviewed and updated today  Family History  Mother    · Family history of Cancer   · Family history of Family Health Status Of Mother -    · Family history of Heart Disease (V17 49)   · Family history of Hypertension (V17 49)   · Family history of Mother  At Age 80  Father    · Family history of Father  At Age 47   · Family history of Heart Disease (V17 49)  Brother    · Family history of Heart Disease (V17 49)     The family history was reviewed and updated today  Social History      · Denied: Alcohol Use (History)   · Being A Social Drinker   · Cigarette smoker (305 1) (F17 210)   · Current Every Day Smoker (305 1)   · Current Smoker (V15 82)   · Daily Tea Consumption (10  Cups/Day)   · Denied: Drug Use (305 90)   · Marital History - Currently    · Recently Stopping Smoking (V15 82)  The social history was reviewed and updated today  The social history was reviewed and is unchanged  Current Meds   1  Allopurinol 100 MG Oral Tablet; TAKE ONE TABLET BY MOUTH EVERY DAY FOR GOUT; Therapy: 26QMJ3735 to (Evaluate:52Jes0850)  Requested for: 2017; Last Rx:2017 Ordered   2  Amoxicillin 500 MG Oral Tablet; 4 po 1 hour prior to procedure  Requested for: 42KFC6668; Last Rx:2017 Ordered   3  B Complex Oral Capsule; TAKE 1 CAPSULE DAILY; Therapy: (Recorded:2014) to Recorded   4   Betamethasone Dipropionate 0 05 % External Cream; APPLY SPARINGLY TO AFFECTED AREA(S) TWICE DAILY; Therapy: 16Edf4782 to (Last Rx:46Qfv8035)  Requested for: 79Lsn7286 Ordered   5  Biotin 1000 MCG Oral Tablet; Take 1 tablet daily; Therapy: (Recorded:08Apr2014) to Recorded   6  Calcium-Carb 600 600 MG Oral Tablet; TAKE 2 TABLETS DAILY; Therapy: 01INA9258 to  Requested for: 21Aug2017 Recorded   7  Centrum Silver Ultra Mens Oral Tablet; 1 tab daily; Therapy: 23YBT8791 to  Requested for: 10MHJ7774 Recorded   8  Cinnamon 500 MG Oral Capsule; Take 1 capsule twice daily; Therapy: (Recorded:08Apr2014) to Recorded   9  ClonazePAM 1 MG Oral Tablet Disintegrating; PLACE 1 TABLET ON TONGUE AND ALLOW TO DISSOLVE TWICE DAILY AS NEEDED; Therapy: 16DCO6422 to (Evaluate:16Sep2016); Last Rx:97Bea9431 Ordered   10  Clopidogrel Bisulfate 75 MG Oral Tablet; take one tablet by mouth every day; Therapy: 11QEU2992 to (Evaluate:79Jwl9920)  Requested for: 11Aug2017; Last  Rx:11Aug2017 Ordered   11  Clotrimazole 1 % External Cream; APPLY 2-3 TIMES DAILY TO AFFECTED AREA(S); Therapy: 21Aug2017 to (Last Rx:21Aug2017)  Requested for: 21Aug2017 Ordered   12  Colcrys 0 6 MG Oral Tablet; TAKE 1 TABLET DAILY AS DIRECTED; Therapy: 19GVK3423 to (Evaluate:21Jun2016); Last Rx:24Mar2016 Ordered   13  Cranberry Fruit Concentrate 98519 MG Oral Capsule; Take 1 capsule twice daily  Recorded   14  [facility] Fish Oil CAPS; TAKE 1 CAPSULE Daily Recorded   15  Donepezil HCl - 5 MG Oral Tablet; 1 every day; Therapy: 98Nck2594 to (Last Rx:81Gjl8394)  Requested for: 92Wtg9816 Ordered   16  Felodipine ER 5 MG Oral Tablet Extended Release 24 Hour; take 1 tablet by mouth once  daily; Therapy: 87CWD0253 to (ZUQVSRUK:75BGU9286)  Requested for: 80Nep4846; Last  Rx:17Yfq6328 Ordered   17  Finasteride 5 MG Oral Tablet; 1 DAILY; Therapy: 65Fyn8308 to (Last Rx:23Itu1684)  Requested for: 36Lnp8306 Ordered   18  Flax Seed Oil 1000 MG Oral Capsule; 2 tabs daily; Therapy: 93YDL5287 to  Requested for: 54NIL5411 Recorded   19   Ginkgo Biloba 120 MG Oral Capsule; Take 1 capsule twice daily; Therapy: (Recorded:21Apr2014) to Recorded   20  Glucosamine 1500 Complex Oral Capsule; TAKE 2 CAPSULE Twice daily; Therapy: (Recorded:08Apr2014) to Recorded   21  Lactaid 4500 UNIT CHEW; CHEW AND SWALLOW 1 TO 3 TABLETS WITH FIRST BITE  OF MILK FOOD; Therapy: (Recorded:21Apr2014) to Recorded   22  Magnesium 500 MG Oral Capsule; take 1 capsule daily; Therapy: (Recorded:21Apr2014) to Recorded   23  Nitrostat 0 4 MG Sublingual Tablet Sublingual; DISSOLVE 1 TABLET UNDER THE  TONGUE AS NEEDED FOR CHEST PAIN;  Therapy: 39HBD6150 to (Evaluate:29Oct2015)  Requested for: 62Uno1153; Last  Rx:30Qlz8612 Ordered   24  PARoxetine HCl - 20 MG Oral Tablet; 1 EVERY MORNING; Therapy: 01BIM0715 to (Last Rx:59Rhg4215)  Requested for: 72Fxu8737 Ordered   25  ProAir  (90 Base) MCG/ACT Inhalation Aerosol Solution; 2 puffs Q4 hours prn  SOB/wheeze; Therapy: 83CSM9074 to (Last Rx:19Nov2013)  Requested for: 09KHA6504 Ordered   26  Selenium  MCG Oral Tablet Extended Release; 1 tab daily; Therapy: 84OCW0250 to  Requested for: 58TIL6536 Recorded   27  Silver Sulfadiazine 1 % External Cream; APPLY TO AFFECTED AREA TWICE DAILY AS  DIRECTED; Therapy: 01LFC3533 to (Last Rx:06Apr2016)  Requested for: 72Aau6568 Ordered   28  Simvastatin 20 MG Oral Tablet; 1 TAB DAILY; Therapy: 21YUM1181 to (Last Rx:35Bxs0242)  Requested for: 08Vti4354 Ordered   29  TraMADol HCl - 50 MG Oral Tablet; TAKE 1 TABLET 4 TIMES DAILY AS NEEDED FOR  PAIN; Last Rx:18Jkq2440 Ordered   30  Vitamin C 500 MG Oral Tablet; TAKE 1 TABLET DAILY; Therapy: (Recorded:21Apr2014) to Recorded   31  Vitamin D 1000 UNIT Oral Tablet; TAKE 1 TABLET DAILY; Therapy: (Recorded:08Apr2014) to Recorded   32  Xarelto 20 MG Oral Tablet; One a day; Therapy: 94ALS3345 to (Last Rx:34Mtb2545) Ordered   33  Zinc 50 MG Oral Tablet; TAKE 1 TABLET DAILY;   Therapy: (Recorded:08Apr2014) to Recorded     The medication list was reviewed and updated today  Allergies  1  No Known Drug Allergies     Vitals       Heart Rate 86   Respiration 20   Systolic 240   Diastolic 84   Height 6 ft    Weight 360 lb    BMI Calculated 48 83   BSA Calculated 2 73      Physical Exam  Left Foot: Appearance: Normal except as noted: excessive pronation-- and-- pes planus  Second toe deformities include hammer toe  Third toe deformities include hammer toe  Forth toe deformities include hammer toe  Fifth toe deformities include hammer toe  Tenderness: None except the great toe,-- distal first metatarsal,-- distal fifth metatarsal-- and-- insertion of the plantar fascia  Positive Brian sign third left intermetatarsal space  ROM: Full  Motor: Normal   Right Foot: Appearance: Normal except as noted: excessive pronation-- and-- pes planus  Second toe deformities include hammer toe  Third toe deformities include hammer toe  Forth toe deformities include hammer toe  Fifth toe deformities include hammer toe  Evaluation of the great toe nail demonstrates an ingrown nail  Tenderness: None except the insertion of the plantar fascia  Left Ankle: Appearance: Normal except erythema,-- swelling-- and-- swelling laterally  Tenderness: None except the tibiotalar joint  Right Ankle: ROM: Full  Neurological Exam: performed  Deep tendon reflexes: + tinel of right tibial nerve  Vascular Exam: performed Dorsalis pedis pulses were present bilaterally  Posterior tibial pulses were present bilaterally  Elevation Pallor: absent bilaterally  Capillary refill time was less than 1 second bilaterally  Edema: moderate bilaterally-- and-- 4/7 pitting  neg  buddy  Toenails: All of the toenails were elongated,-- hypertrophied,-- discolored,-- ingrown,-- shown to have subungual debris,-- tender-- and-- Mycotic with onychauxis  Hyperkeratosis: present on both first toes,-- present on both first sub metatarsals-- and-- present on both fifth sub metatarsals  Shoe Gear Evaluation: performed ()  Recommendation(s): extra depth diabetic shoes  Procedure  All abnormal skin a mycotic nails debrided without complication her pain  Gentian violet applied to webspace        There are no diagnoses linked to this encounter  Subjective:      Patient ID: Torrie Sanchez is a 68 y o  male  HPI    The following portions of the patient's history were reviewed and updated as appropriate: allergies, current medications, past family history, past medical history, past social history, past surgical history and problem list     Review of Systems      Objective:      Foot ExamPhysical Exam

## 2018-04-20 DIAGNOSIS — I25.10 CORONARY ARTERY ARTERIOSCLEROSIS: ICD-10-CM

## 2018-04-20 DIAGNOSIS — E79.0 HYPERURICEMIA: ICD-10-CM

## 2018-04-20 DIAGNOSIS — I10 ESSENTIAL HYPERTENSION: ICD-10-CM

## 2018-04-20 RX ORDER — CLOPIDOGREL BISULFATE 75 MG/1
TABLET ORAL
Qty: 90 TABLET | Refills: 3 | Status: SHIPPED | OUTPATIENT
Start: 2018-04-20 | End: 2019-04-30 | Stop reason: SDUPTHER

## 2018-04-20 RX ORDER — SIMVASTATIN 20 MG
20 TABLET ORAL
Qty: 90 TABLET | Refills: 3 | Status: SHIPPED | OUTPATIENT
Start: 2018-04-20 | End: 2018-05-01 | Stop reason: SDUPTHER

## 2018-04-20 RX ORDER — ALLOPURINOL 100 MG/1
TABLET ORAL
Qty: 90 TABLET | Refills: 3 | Status: SHIPPED | OUTPATIENT
Start: 2018-04-20 | End: 2018-05-01 | Stop reason: SDUPTHER

## 2018-04-20 RX ORDER — FELODIPINE 5 MG/1
TABLET, EXTENDED RELEASE ORAL
Qty: 90 TABLET | Refills: 3 | Status: SHIPPED | OUTPATIENT
Start: 2018-04-20 | End: 2019-04-30 | Stop reason: SDUPTHER

## 2018-05-01 ENCOUNTER — TELEPHONE (OUTPATIENT)
Dept: FAMILY MEDICINE CLINIC | Facility: CLINIC | Age: 78
End: 2018-05-01

## 2018-05-01 DIAGNOSIS — I25.10 CORONARY ARTERY ARTERIOSCLEROSIS: ICD-10-CM

## 2018-05-01 DIAGNOSIS — E79.0 HYPERURICEMIA: ICD-10-CM

## 2018-05-01 DIAGNOSIS — N42.9 PROSTATE DISORDER: ICD-10-CM

## 2018-05-01 RX ORDER — ALLOPURINOL 100 MG/1
100 TABLET ORAL DAILY
Qty: 90 TABLET | Refills: 3 | Status: SHIPPED | OUTPATIENT
Start: 2018-05-01 | End: 2019-03-11 | Stop reason: SDUPTHER

## 2018-05-01 RX ORDER — FINASTERIDE 5 MG/1
5 TABLET, FILM COATED ORAL DAILY
Qty: 90 TABLET | Refills: 3 | Status: SHIPPED | OUTPATIENT
Start: 2018-05-01 | End: 2019-04-30 | Stop reason: SDUPTHER

## 2018-05-01 RX ORDER — ALLOPURINOL 100 MG/1
100 TABLET ORAL DAILY
Qty: 90 TABLET | Refills: 3 | Status: SHIPPED | OUTPATIENT
Start: 2018-05-01 | End: 2018-05-01 | Stop reason: SDUPTHER

## 2018-05-01 RX ORDER — FINASTERIDE 5 MG/1
5 TABLET, FILM COATED ORAL DAILY
Qty: 90 TABLET | Refills: 3 | Status: SHIPPED | OUTPATIENT
Start: 2018-05-01 | End: 2018-05-01 | Stop reason: SDUPTHER

## 2018-05-01 RX ORDER — SIMVASTATIN 20 MG
20 TABLET ORAL
Qty: 90 TABLET | Refills: 3 | Status: SHIPPED | OUTPATIENT
Start: 2018-05-01 | End: 2019-04-30 | Stop reason: SDUPTHER

## 2018-05-01 RX ORDER — SIMVASTATIN 20 MG
20 TABLET ORAL
Qty: 90 TABLET | Refills: 3 | Status: SHIPPED | OUTPATIENT
Start: 2018-05-01 | End: 2018-05-01 | Stop reason: SDUPTHER

## 2018-05-01 NOTE — TELEPHONE ENCOUNTER
We received a fax request directly from the [facility] pharmacy and I sent it to Dr BARRETT at [phone] this am as a phone call  I didn't want to do it as a refill   Roseline Bauman   (I am putting in the messages for him how we received it just so you guys can tell  It says incoming fax from the pharmacy   This way you will know how we are getting it here) Thanks!!

## 2018-05-01 NOTE — TELEPHONE ENCOUNTER
Chana Og, I sent this request to Dr Demarcus Jean Baptiste  Did this request come by fax? Did they call?

## 2018-05-01 NOTE — TELEPHONE ENCOUNTER
Pharmacy is requesting Finasteride 5 mg, Simvastatin 20 mg, and Allopurinol 100 mg to be e scribed to [facility]   Alejos Olszewski

## 2018-05-01 NOTE — TELEPHONE ENCOUNTER
Ok thanks, Dr Gisselle Moise just wanted to know  Hopefully next time they will fax it here  No further action needed

## 2018-05-17 ENCOUNTER — TELEPHONE (OUTPATIENT)
Dept: FAMILY MEDICINE CLINIC | Facility: CLINIC | Age: 78
End: 2018-05-17

## 2018-05-17 NOTE — TELEPHONE ENCOUNTER
Pts wife called stating the pt has been experiencing really bad ringing in his ears  Pts wife read that to help the pt could take 3mg of Melatonin before bed  His nephrologist said it would be ok to take it but to check with pcp  Please advise

## 2018-05-22 ENCOUNTER — TELEPHONE (OUTPATIENT)
Dept: FAMILY MEDICINE CLINIC | Facility: CLINIC | Age: 78
End: 2018-05-22

## 2018-05-22 NOTE — TELEPHONE ENCOUNTER
Pt wife called  To inquire if it is safe to take CBD oil? Would like to make sure there are no interactions with his current medications  especially  Felodipine 5 mg and  Anti-dep  CBD oil not prescribed        please advise/af/rma

## 2018-05-26 DIAGNOSIS — F06.4 ANXIETY DISORDER DUE TO MEDICAL CONDITION: ICD-10-CM

## 2018-05-29 RX ORDER — PAROXETINE HYDROCHLORIDE 20 MG/1
TABLET, FILM COATED ORAL
Qty: 30 TABLET | Refills: 0 | Status: SHIPPED | OUTPATIENT
Start: 2018-05-29 | End: 2018-10-28

## 2018-05-31 ENCOUNTER — OFFICE VISIT (OUTPATIENT)
Dept: FAMILY MEDICINE CLINIC | Facility: CLINIC | Age: 78
End: 2018-05-31
Payer: MEDICARE

## 2018-05-31 VITALS
SYSTOLIC BLOOD PRESSURE: 128 MMHG | DIASTOLIC BLOOD PRESSURE: 82 MMHG | HEART RATE: 80 BPM | WEIGHT: 315 LBS | BODY MASS INDEX: 49.77 KG/M2 | RESPIRATION RATE: 20 BRPM | TEMPERATURE: 98.4 F

## 2018-05-31 DIAGNOSIS — M25.571 PAIN IN JOINTS OF BOTH FEET: Primary | ICD-10-CM

## 2018-05-31 DIAGNOSIS — R60.0 BILATERAL EDEMA OF LOWER EXTREMITY: ICD-10-CM

## 2018-05-31 DIAGNOSIS — N18.30 BENIGN HYPERTENSION WITH CKD (CHRONIC KIDNEY DISEASE) STAGE III (HCC): ICD-10-CM

## 2018-05-31 DIAGNOSIS — I70.209 PERIPHERAL ARTERIOSCLEROSIS (HCC): ICD-10-CM

## 2018-05-31 DIAGNOSIS — M25.572 PAIN IN JOINTS OF BOTH FEET: Primary | ICD-10-CM

## 2018-05-31 DIAGNOSIS — M48.061 SPINAL STENOSIS AT L4-L5 LEVEL: ICD-10-CM

## 2018-05-31 DIAGNOSIS — N18.30 CKD (CHRONIC KIDNEY DISEASE), STAGE III (HCC): ICD-10-CM

## 2018-05-31 DIAGNOSIS — I12.9 BENIGN HYPERTENSION WITH CKD (CHRONIC KIDNEY DISEASE) STAGE III (HCC): ICD-10-CM

## 2018-05-31 PROCEDURE — 99214 OFFICE O/P EST MOD 30 MIN: CPT | Performed by: FAMILY MEDICINE

## 2018-05-31 NOTE — PROGRESS NOTES
Subjective:           Problem List Items Addressed This Visit     Spinal stenosis at L4-L5 level worsening -PT trial    Pain in joints of both feet - Primary    Peripheral arteriosclerosis (HCC) unch    Benign hypertension with CKD (chronic kidney disease) stage III    Bilateral edema of lower extremity worsening compression elevation    CKD (chronic kidney disease), stage III f/u labs              Orders Placed This Encounter   Procedures    Ambulatory referral to Physical Therapy     Standing Status:   Future     Standing Expiration Date:   11/30/2018     Referral Priority:   ASAP     Referral Type:   Physical Therapy     Referral Reason:   Specialty Services Required     Requested Specialty:   Physical Therapy     Number of Visits Requested:   1     Expiration Date:   5/31/2019       Patient Instructions   High protein low salt diet  Labs as ordered,   Compression elevation  Pain for radiculopathy  PT evaluation Spine eval if not helful  10% weight loss goal in 6 months     stay current with colon/prostate screening      Mary Hennessy is in today to follow-up relative to low extremity pain and swelling worsening over time  Had epidural steroids x 2 minimal  Some improvement in knees after steroid  He is much less active than usual   He had a Cardiology evaluation yesterday and was recommended physical therapy  I have no report as yet he has a history of hypertension peripheral arterial disease chronic kidney disease lumbar radiculopathy with lower extremity neuropathic changes and spinal stenosis chronic lower extremity edema anxiety currently on medications for the above  No results found for this or any previous visit (from the past 1344 hour(s))    Pending f/u labs wgt increased from recent Nephrology visit      Vitals:    05/31/18 1518   BP: 128/82   Pulse: 80   Resp: 20   Temp: 98 4 °F (36 9 °C)       No Known Allergies    Current Outpatient Prescriptions on File Prior to Visit Medication Sig Dispense Refill    albuterol (VENTOLIN HFA) 90 mcg/act inhaler Inhale 2 puffs every 6 (six) hours as needed for wheezing 1 Inhaler 6    allopurinol (ZYLOPRIM) 100 mg tablet Take 1 tablet (100 mg total) by mouth daily 90 tablet 3    ascorbic acid (VITAMIN C) 500 mg tablet Take 500 mg by mouth daily   B Complex Vitamins (B COMPLEX 1 PO) Take 1 tablet by mouth daily   Biotin (BIOTIN 5000) 5 MG CAPS Take 1,000 mcg by mouth daily   calcium carbonate-vitamin D (OSCAL-D) 500 mg-200 units per tablet Take 2 tablets by mouth daily at bedtime   clopidogrel (PLAVIX) 75 mg tablet TAKE 1 TABLET DAILY 90 tablet 3    collagenase (SANTYL) ointment Apply 1 application topically daily   Cranberry 1000 MG CAPS Take 1 tablet by mouth 2 (two) times a day   felodipine (PLENDIL) 5 mg 24 hr tablet TAKE 1 TABLET DAILY 90 tablet 3    finasteride (PROSCAR) 5 mg tablet Take 1 tablet (5 mg total) by mouth daily 90 tablet 3    Flaxseed, Linseed, (EQL FLAX SEED OIL) 1000 MG CAPS Take by mouth daily   furosemide (LASIX) 40 mg tablet Take 40 mg by mouth daily as needed        glucosamine-chondroitin 500-400 MG tablet Take 1 tablet by mouth 2 (two) times a day   lactase (LACTAID) 3,000 units tablet Take 3,000 Units by mouth as needed   Omega-3 Fatty Acids (FISH OIL) 1,000 mg Take 1,000 mg by mouth 2 (two) times a day   PARoxetine (PAXIL) 20 mg tablet TAKE 1/2 TABLET BY MOUTH DAILY 30 tablet 0    psyllium (METAMUCIL) 58 6 % powder Take 1 packet by mouth daily Indications: 1 tsp   rivaroxaban (XARELTO) tablet Take 20 mg by mouth   simvastatin (ZOCOR) 20 mg tablet Take 1 tablet (20 mg total) by mouth daily at bedtime 90 tablet 3    UNABLE TO FIND 100 Units daily   Med Name: vit d3      gabapentin (NEURONTIN) 100 mg capsule Take 1 capsule (100 mg total) by mouth 3 (three) times a day for 30 days 90 capsule 0    [DISCONTINUED] diazepam (VALIUM) 5 mg tablet 1 po qhs prn for muscle spasm, avoid use within 8 hrs of tramadol, fall caution 21 tablet 0    [DISCONTINUED] Lysine 500 MG CAPS Take 1 tablet by mouth daily   [DISCONTINUED] PARoxetine (PAXIL) 20 mg tablet Take 0 5 tablets (10 mg total) by mouth daily 90 tablet 3    [DISCONTINUED] solifenacin (VESICARE) 10 MG tablet Take 10 mg by mouth daily  No current facility-administered medications on file prior to visit  Past Medical History:   Diagnosis Date    Arthritis     Chronic kidney disease     stage 3    Coronary artery disease     2 stents    Fluid retention     Gout     Heart failure (HCC)     pacemaker    Hypertension     Pacemaker     Pulmonary emphysema (HCC)     Shortness of breath     exertion    Sleep apnea     c pap       Past Surgical History:   Procedure Laterality Date    ANGIOPLASTY      x2 2 stents and then replaced    CARDIAC SURGERY      pacemaker    CHOLECYSTECTOMY      EPIDURAL BLOCK INJECTION N/A 5/26/2016    Procedure: BLOCK / INJECTION EPIDURAL STEROID LUMBAR  L4-5;  Surgeon: Aramis Pimentel MD;  Location: [facility] MAIN OR;  Service:     EYE SURGERY      cataract left    KNEE ARTHROSCOPY W/ MENISCAL REPAIR Left     LUMBAR EPIDURAL INJECTION N/A 3/17/2016    Procedure: BLOCK / INJECTION LUMBAR  L4-5  (C-ARM); Surgeon: Aramis Pimentel MD;  Location: [facility] MAIN OR;  Service:           reports that he quit smoking about 3 years ago  He has a 50 00 pack-year smoking history  He has never used smokeless tobacco  He reports that he does not drink alcohol or use drugs  reports that he quit smoking about 3 years ago  He has a 50 00 pack-year smoking history  He has never used smokeless tobacco         Review of Systems   Constitutional: Positive for appetite change, fatigue and unexpected weight change  Negative for fever  HENT: Negative for ear discharge, mouth sores, nosebleeds and sneezing  Respiratory: Negative for cough and choking           PRETTY   Cardiovascular: Positive for leg swelling  Negative for chest pain  Gastrointestinal: Positive for abdominal distention  Negative for blood in stool  Genitourinary: Negative for dysuria and hematuria  Musculoskeletal: Positive for arthralgias, back pain, gait problem and joint swelling  Neurological: Positive for numbness  Negative for syncope, facial asymmetry and light-headedness  Psychiatric/Behavioral: Positive for dysphoric mood  Negative for self-injury  Physical Exam   Constitutional:   obese   HENT:   Head: Normocephalic  Nose: Nose normal    Eyes: EOM are normal  Pupils are equal, round, and reactive to light  Neck: Normal range of motion  Neck supple  No JVD present  Pulmonary/Chest: Effort normal and breath sounds normal  He has no wheezes  Abdominal: He exhibits distension  He exhibits no mass  Round obese   Musculoskeletal: Normal range of motion  He exhibits edema  Tibial/ankles pitting   Skin: Capillary refill takes 2 to 3 seconds  Rash noted     CV stasis changes

## 2018-05-31 NOTE — PATIENT INSTRUCTIONS
High protein low salt diet  Labs as ordered,   Compression elevation  Pain for radiculopathy   PT evaluation Spine eval if not helful  10% weight loss goal in 6 months     stay current with colon/prostate screening

## 2018-06-06 ENCOUNTER — EVALUATION (OUTPATIENT)
Dept: PHYSICAL THERAPY | Facility: CLINIC | Age: 78
End: 2018-06-06
Payer: MEDICARE

## 2018-06-06 DIAGNOSIS — M48.061 SPINAL STENOSIS AT L4-L5 LEVEL: Primary | ICD-10-CM

## 2018-06-06 PROCEDURE — G8979 MOBILITY GOAL STATUS: HCPCS | Performed by: PHYSICAL THERAPIST

## 2018-06-06 PROCEDURE — 97162 PT EVAL MOD COMPLEX 30 MIN: CPT | Performed by: PHYSICAL THERAPIST

## 2018-06-06 PROCEDURE — G8978 MOBILITY CURRENT STATUS: HCPCS | Performed by: PHYSICAL THERAPIST

## 2018-06-06 NOTE — PROGRESS NOTES
PT Evaluation     Today's date: 2018  Patient name: Andrea Cooper  : 1940  MRN: [id number]  Referring provider: Kenny Cornejo MD  Dx:   Encounter Diagnosis     ICD-10-CM    1  Spinal stenosis at L4-L5 level M48 061 Ambulatory referral to Physical Therapy       Start Time: 810  Stop Time: 0850  Total time in clinic (min): 40 minutes    Assessment  Impairments: abnormal gait, abnormal muscle tone, abnormal or restricted ROM, activity intolerance, impaired physical strength, lacks appropriate home exercise program, pain with function and poor posture     Assessment details: Andrea Cooper is a 66 y o  male who presents to physical therapy with pain, decreased LE range of motion, decreased LE strength, impaired function and poor posture  Patient's clinical presentation is consistent with their referring diagnosis of Spinal stenosis at L4-L5 level  (primary encounter diagnosis)  The pt presents with functional limitations of ADLs, recreational activities, ambulation and performing household chores  Pt would benefit from physical therapy services to address these limitations and maximize function  Pt was instructed and educated on good sitting posture today and demonstrates understanding  Understanding of Dx/Px/POC: good   Prognosis: good    Goals  Short term goals:  (3 weeks)  1  Patient will have pain level 2/10 or less in lumbar spine with ADL's   2  Patient will report a 50% improvement in symptoms with ADL's  3  Patient will have normal lower extremity ROM  4  Patient will demonstrate correct sitting posture      Long term goals:  (6 weeks)  1  Patient will demonstrate a reduction in tenderness and tension in hypertonic musculature  2  Patient will report 85 % improvement improvement in symptoms with ADL's  3  Patient will have no complaints of pain with walking  4   Patient will be independent in a comprehensive home exercise program       Plan  Patient would benefit from: PT eval and skilled physical therapy  Planned modality interventions: thermotherapy: hydrocollator packs  Planned therapy interventions: home exercise program, functional ROM exercises, gait training, abdominal trunk stabilization, massage, neuromuscular re-education, patient education, postural training, stretching, strengthening, therapeutic exercise and joint mobilization  Frequency: 2x week  Duration in visits: 12  Duration in weeks: 6  Treatment plan discussed with: patient        Subjective Evaluation    History of Present Illness  Mechanism of injury: He reports he has been having difficulty standing up and having pain in L-S and bilateral lower legs  He reports pain began insidiously over a year ago  He followed up with Dr Konstantin Tejada   He was then referred to PT   Pain  Current pain ratin  At best pain ratin  At worst pain ratin  Quality: discomfort and dull ache    Social Support    Employment status: not working  Patient Goals  Patient goals for therapy: decreased pain and return to sport/leisure activities          Objective     Postural Observations  Seated posture: poor        Palpation   Left   Hypertonic in the erector spinae  Tenderness of the erector spinae  Right   Hypertonic in the erector spinae and quadratus lumborum  Tenderness of the erector spinae and quadratus lumborum  Tenderness     Right Hip   Tenderness in the PSIS  Active Range of Motion     Lumbar   Flexion: Active lumbar flexion: Moderate ROM restriction  Extension: Active lumbar extension: Significant ROM restriction  Left lateral flexion: Active left lumbar lateral flexion: Moderate ROM restriction  Right lateral flexion: Active right lumbar lateral flexion: Moderate ROM restriction  with pain  Left rotation: Active left lumbar rotation: Moderate ROM restriction  Right rotation: Active right lumbar rotation: Moderate ROM restriction       Additional Active Range of Motion Details  Bilateral side glide had no affect on lumbar or leg symptoms  Passive Range of Motion     Additional Passive Range of Motion Details  SLR - Left = 54 degrees, Right = 46 degrees    Strength/Myotome Testing     Left Hip   Planes of Motion   Flexion: 5    Right Hip   Planes of Motion   Flexion: 5    Left Knee   Flexion: 5  Extension: 5    Right Knee   Flexion: 5  Extension: 5    Left Ankle/Foot   Dorsiflexion: 5  Plantar flexion: 5    Right Ankle/Foot   Dorsiflexion: 5  Plantar flexion: 5    Tests     Lumbar     Left   Negative repeated side glide  Right   Negative repeated side glide       Additional Tests Details  Jalenene Bimler test was positive for hip flexor tightness Right > Left      Flowsheet Rows      Most Recent Value   PT/OT G-Codes   Current Score  41   Projected Score  53   FOTO information reviewed  Yes   Assessment Type  Evaluation   G code set  Mobility: Walking & Moving Around   Mobility: Walking and Moving Around Current Status ()  CK   Mobility: Walking and Moving Around Goal Status ()  CJ          Precautions - Hypertension, pacemaker    Specialty Daily Treatment Diary     Manual  6/6/18       STM to L-S        Stretch HS and psoas        Right leg pull                            Exercise Diary         NuStep        Lunge psoas stretch        Knee ext with df        LTR        TBand pullover        Pelvic tilt        glute sets        Clamshells                                                                                                            Modalities        MH or CP                Visit # 1

## 2018-06-11 ENCOUNTER — OFFICE VISIT (OUTPATIENT)
Dept: PHYSICAL THERAPY | Facility: CLINIC | Age: 78
End: 2018-06-11
Payer: MEDICARE

## 2018-06-11 DIAGNOSIS — M48.061 SPINAL STENOSIS AT L4-L5 LEVEL: Primary | ICD-10-CM

## 2018-06-11 PROCEDURE — 97140 MANUAL THERAPY 1/> REGIONS: CPT | Performed by: PHYSICAL THERAPIST

## 2018-06-11 PROCEDURE — 97110 THERAPEUTIC EXERCISES: CPT | Performed by: PHYSICAL THERAPIST

## 2018-06-11 NOTE — PROGRESS NOTES
Daily Note     Today's date: 2018  Patient name: Sondra Cardona  : 1940  MRN: [id number]  Referring provider: Annette Spangler MD  Dx:   Encounter Diagnosis     ICD-10-CM    1  Spinal stenosis at L4-L5 level M48 061                   Subjective: Pt reports 4/10 pain in bilateral lumbar spine today  Objective: See treatment diary below     Precautions - Hypertension, pacemaker     Specialty Daily Treatment Diary      Manual  18         STM to L-S    5 min         Stretch HS and psoas    6 min         Right leg pull    2 min                                           Exercise Diary              NuStep    5 min         Lunge psoas stretch    10x         Knee ext with df    10x         LTR    10x         TBand pullover    10x         Pelvic tilt             glute sets    20x         Clamshells    20x  blue                                                                                                                                                                                       Modalities             MH   10 min                       Visit # 1  2              Assessment: Tolerated treatment well  Patient would benefit from continued PT      Plan: Continue per plan of care

## 2018-06-13 ENCOUNTER — OFFICE VISIT (OUTPATIENT)
Dept: PHYSICAL THERAPY | Facility: CLINIC | Age: 78
End: 2018-06-13
Payer: MEDICARE

## 2018-06-13 DIAGNOSIS — M48.061 SPINAL STENOSIS AT L4-L5 LEVEL: Primary | ICD-10-CM

## 2018-06-13 PROCEDURE — 97110 THERAPEUTIC EXERCISES: CPT

## 2018-06-13 PROCEDURE — 97140 MANUAL THERAPY 1/> REGIONS: CPT

## 2018-06-13 NOTE — PROGRESS NOTES
Daily Note     Today's date: 2018  Patient name: Tracy Baltazar  : 1940  MRN: [id number]  Referring provider: Luiza Chapa MD  Dx:   Encounter Diagnosis     ICD-10-CM    1  Spinal stenosis at L4-L5 level M48 061                   Subjective: Pt reports no LBP today but has bilat knee pain with L > R        Objective: See treatment diary below     Precautions - Hypertension, pacemaker     Specialty Daily Treatment Diary      Manual  18       STM to L-S    5 min  6 min       Stretch HS and psoas    6 min  5 min       Right leg pull    2 min 2 min                                         Exercise Diary              NuStep    5 min  5 min       Lunge psoas stretch    10x  10        Knee ext with df    10x  20       LTR    10x  20       TBand pullover    10x  20 X gr       Pelvic tilt             glute sets    20x  20       Clamshells    20x  blue  20 X blue                                                                                                                                                                                     Modalities             MH   10 min  10 min                     Visit # 1  2 3            Assessment: Tolerated treatment well  Shows tightness in Bilat LE with R >L  Patient would benefit from continued PT      Plan: Continue per plan of care

## 2018-06-18 ENCOUNTER — APPOINTMENT (OUTPATIENT)
Dept: RADIOLOGY | Facility: CLINIC | Age: 78
End: 2018-06-18
Payer: MEDICARE

## 2018-06-18 ENCOUNTER — APPOINTMENT (OUTPATIENT)
Dept: PHYSICAL THERAPY | Facility: CLINIC | Age: 78
End: 2018-06-18
Payer: MEDICARE

## 2018-06-18 ENCOUNTER — OFFICE VISIT (OUTPATIENT)
Dept: OBGYN CLINIC | Facility: CLINIC | Age: 78
End: 2018-06-18
Payer: MEDICARE

## 2018-06-18 VITALS
BODY MASS INDEX: 41.75 KG/M2 | DIASTOLIC BLOOD PRESSURE: 80 MMHG | HEIGHT: 73 IN | HEART RATE: 69 BPM | SYSTOLIC BLOOD PRESSURE: 147 MMHG | WEIGHT: 315 LBS

## 2018-06-18 DIAGNOSIS — M25.561 PAIN IN BOTH KNEES, UNSPECIFIED CHRONICITY: ICD-10-CM

## 2018-06-18 DIAGNOSIS — M17.0 BILATERAL PRIMARY OSTEOARTHRITIS OF KNEE: Primary | ICD-10-CM

## 2018-06-18 DIAGNOSIS — M25.562 PAIN IN BOTH KNEES, UNSPECIFIED CHRONICITY: ICD-10-CM

## 2018-06-18 PROCEDURE — 20610 DRAIN/INJ JOINT/BURSA W/O US: CPT | Performed by: ORTHOPAEDIC SURGERY

## 2018-06-18 PROCEDURE — 99214 OFFICE O/P EST MOD 30 MIN: CPT | Performed by: ORTHOPAEDIC SURGERY

## 2018-06-18 PROCEDURE — 73562 X-RAY EXAM OF KNEE 3: CPT

## 2018-06-18 RX ORDER — LIDOCAINE HYDROCHLORIDE 10 MG/ML
4 INJECTION, SOLUTION INFILTRATION; PERINEURAL
Status: COMPLETED | OUTPATIENT
Start: 2018-06-18 | End: 2018-06-18

## 2018-06-18 RX ORDER — DEXAMETHASONE SODIUM PHOSPHATE 100 MG/10ML
40 INJECTION INTRAMUSCULAR; INTRAVENOUS
Status: COMPLETED | OUTPATIENT
Start: 2018-06-18 | End: 2018-06-18

## 2018-06-18 RX ADMIN — DEXAMETHASONE SODIUM PHOSPHATE 40 MG: 100 INJECTION INTRAMUSCULAR; INTRAVENOUS at 11:53

## 2018-06-18 RX ADMIN — DEXAMETHASONE SODIUM PHOSPHATE 40 MG: 100 INJECTION INTRAMUSCULAR; INTRAVENOUS at 11:56

## 2018-06-18 RX ADMIN — LIDOCAINE HYDROCHLORIDE 4 ML: 10 INJECTION, SOLUTION INFILTRATION; PERINEURAL at 11:56

## 2018-06-18 RX ADMIN — LIDOCAINE HYDROCHLORIDE 4 ML: 10 INJECTION, SOLUTION INFILTRATION; PERINEURAL at 11:53

## 2018-06-18 NOTE — PROGRESS NOTES
Assessment/Plan:  1  Pain in both knees, unspecified chronicity  XR knee 3 vw left non injury    XR knee 3 vw right non injury   2  Bilateral primary osteoarthritis of knee         Large joint arthrocentesis  Date/Time: 6/18/2018 11:53 AM  Consent given by: parent  Timeout: Immediately prior to procedure a time out was called to verify the correct patient, procedure, equipment, support staff and site/side marked as required   Supporting Documentation  Indications: pain   Procedure Details  Location: knee - L knee  Needle size: 22 G  Ultrasound guidance: no  Approach: anterolateral  Medications administered: 4 mL lidocaine 1 %; 40 mg dexamethasone 100 mg/10 mL    Patient tolerance: patient tolerated the procedure well with no immediate complications  Dressing:  Sterile dressing applied  Large joint arthrocentesis  Date/Time: 6/18/2018 11:56 AM  Consent given by: patient  Timeout: Immediately prior to procedure a time out was called to verify the correct patient, procedure, equipment, support staff and site/side marked as required   Supporting Documentation  Indications: pain   Procedure Details  Location: knee - R knee  Needle size: 22 G  Approach: anterolateral  Medications administered: 4 mL lidocaine 1 %; 40 mg dexamethasone 100 mg/10 mL            Kristan Slater has severe tricompartmental osteoarthritis of both the left and the right knee  There is postsurgical artifacts in the left knee that migrated  I explained to Kristan Slater that though should have been removed previously  They still can be removed if necessary or when having a total knee replacement  His arthritic changes are severe enough that total knee replacement is an option for him  We also spoke of joint lubricating injections  He has done very well with cortisone injection in the past and would like to have an injection for both his left and right knee today  He was provided there is injections and tolerated the procedure well   He can follow up with me as needed for this  I explained him that this may take 2-3 weeks to take effect  If he is showing no relief in 4 weeks he can return to see me will will discuss Euflexxa injections and referral to Dr Cuba Cordon our total joint specialists  Subjective:    Merlin Louise is a 66 y o  male who presents today with bilateral knee pain the left more so than the right  Michelineramon Salmeron has a history of arthritis in both the left and right knees  He has been treated by another provider who recommended that Jenae Khan have total knee replacement  He states previously he was treated with cortisone injection which provided him relief for well over a year  Jenae Khan does have a history of trauma to the left knee nearly 30 years ago  He describes a quadriceps tendon rupture that was surgically repaired  His chief complaint is of the persistent mild to moderate ache about both knees that is global   Bearing weight can causes knee pain to become sharp and severe in the medial aspect of both the left and right knee  His pain is nonradiating  He denies distal paresthesias  He is only somewhat better with rest       Review of Systems   Constitutional: Positive for unexpected weight change  Negative for chills and fever  HENT: Negative for hearing loss, nosebleeds and sore throat  Eyes: Negative for pain, redness and visual disturbance  Respiratory: Positive for shortness of breath and wheezing  Negative for cough  Cardiovascular: Positive for leg swelling  Negative for chest pain and palpitations  Gastrointestinal: Negative for abdominal pain, nausea and vomiting  Endocrine: Negative for polyphagia and polyuria  Genitourinary: Negative for dysuria and hematuria  Musculoskeletal:        See HPI   Skin: Negative for rash and wound  Neurological: Negative for dizziness, numbness and headaches  Psychiatric/Behavioral: Negative for decreased concentration and suicidal ideas  The patient is not nervous/anxious  Past Medical History:   Diagnosis Date    Arthritis     Chronic kidney disease     stage 3    Coronary artery disease     2 stents    Fluid retention     Gout     Heart failure (HCC)     pacemaker    Hypertension     Pacemaker     Pulmonary emphysema (HCC)     Shortness of breath     exertion    Sleep apnea     c pap       Past Surgical History:   Procedure Laterality Date    ANGIOPLASTY      x2 2 stents and then replaced    CARDIAC SURGERY      pacemaker    CHOLECYSTECTOMY      EPIDURAL BLOCK INJECTION N/A 5/26/2016    Procedure: BLOCK / INJECTION EPIDURAL STEROID LUMBAR  L4-5;  Surgeon: Devon Vo MD;  Location: [facility] [place] MAIN OR;  Service:     EYE SURGERY      cataract left    KNEE ARTHROSCOPY W/ MENISCAL REPAIR Left     LUMBAR EPIDURAL INJECTION N/A 3/17/2016    Procedure: BLOCK / INJECTION LUMBAR  L4-5  (C-ARM); Surgeon: Devon Vo MD;  Location: [facility] MAIN OR;  Service:        Family History   Problem Relation Age of Onset    No Known Problems Mother     Heart disease Father        Social History     Occupational History    RETIRED      Social History Main Topics    Smoking status: Former Smoker     Packs/day: 1 00     Years: 50 00     Quit date: 2015    Smokeless tobacco: Never Used    Alcohol use No    Drug use: No    Sexual activity: Not on file         Current Outpatient Prescriptions:     albuterol (VENTOLIN HFA) 90 mcg/act inhaler, Inhale 2 puffs every 6 (six) hours as needed for wheezing, Disp: 1 Inhaler, Rfl: 6    allopurinol (ZYLOPRIM) 100 mg tablet, Take 1 tablet (100 mg total) by mouth daily, Disp: 90 tablet, Rfl: 3    ascorbic acid (VITAMIN C) 500 mg tablet, Take 500 mg by mouth daily  , Disp: , Rfl:     B Complex Vitamins (B COMPLEX 1 PO), Take 1 tablet by mouth daily  , Disp: , Rfl:     Biotin (BIOTIN 5000) 5 MG CAPS, Take 1,000 mcg by mouth daily  , Disp: , Rfl:     calcium carbonate-vitamin D (OSCAL-D) 500 mg-200 units per tablet, Take 2 tablets by mouth daily at bedtime  , Disp: , Rfl:     clopidogrel (PLAVIX) 75 mg tablet, TAKE 1 TABLET DAILY, Disp: 90 tablet, Rfl: 3    collagenase (SANTYL) ointment, Apply 1 application topically daily  , Disp: , Rfl:     Cranberry 1000 MG CAPS, Take 1 tablet by mouth 2 (two) times a day , Disp: , Rfl:     felodipine (PLENDIL) 5 mg 24 hr tablet, TAKE 1 TABLET DAILY, Disp: 90 tablet, Rfl: 3    finasteride (PROSCAR) 5 mg tablet, Take 1 tablet (5 mg total) by mouth daily, Disp: 90 tablet, Rfl: 3    Flaxseed, Linseed, (EQL FLAX SEED OIL) 1000 MG CAPS, Take by mouth daily  , Disp: , Rfl:     furosemide (LASIX) 40 mg tablet, Take 40 mg by mouth daily as needed  , Disp: , Rfl:     glucosamine-chondroitin 500-400 MG tablet, Take 1 tablet by mouth 2 (two) times a day , Disp: , Rfl:     lactase (LACTAID) 3,000 units tablet, Take 3,000 Units by mouth as needed  , Disp: , Rfl:     Omega-3 Fatty Acids (FISH OIL) 1,000 mg, Take 1,000 mg by mouth 2 (two) times a day , Disp: , Rfl:     PARoxetine (PAXIL) 20 mg tablet, TAKE 1/2 TABLET BY MOUTH DAILY, Disp: 30 tablet, Rfl: 0    psyllium (METAMUCIL) 58 6 % powder, Take 1 packet by mouth daily Indications: 1 tsp , Disp: , Rfl:     rivaroxaban (XARELTO) tablet, Take 20 mg by mouth , Disp: , Rfl:     simvastatin (ZOCOR) 20 mg tablet, Take 1 tablet (20 mg total) by mouth daily at bedtime, Disp: 90 tablet, Rfl: 3    UNABLE TO FIND, 100 Units daily  Med Name: vit d3, Disp: , Rfl:     gabapentin (NEURONTIN) 100 mg capsule, Take 1 capsule (100 mg total) by mouth 3 (three) times a day for 30 days, Disp: 90 capsule, Rfl: 0    No Known Allergies    Objective:  Vitals:    06/18/18 1111   BP: 147/80   Pulse: 69       Right Knee Exam     Tenderness   The patient is experiencing tenderness in the medial joint line      Range of Motion   Extension: 0   Flexion: 110     Tests   Drawer:       Anterior - negative    Posterior - negative  Varus: negative  Valgus: negative    Other   Scars: present  Sensation: normal  Swelling: mild    Comments:  Antalgic gait  Left Knee Exam     Tenderness   The patient is experiencing tenderness in the medial joint line  Range of Motion   Extension: -15   Flexion: 100     Tests   Drawer:       Anterior - negative     Posterior - negative  Varus: negative  Valgus: negative    Other   Erythema: absent  Scars: present  Sensation: normal  Swelling: mild    Comments:  Antalgic gait            Physical Exam   Constitutional: He is oriented to person, place, and time  He appears well-developed  Obesity   HENT:   Head: Normocephalic and atraumatic  Eyes: Conjunctivae are normal    Neck: Neck supple  Cardiovascular: Intact distal pulses  Pulmonary/Chest: Effort normal    Abdominal: Soft  Neurological: He is alert and oriented to person, place, and time  Skin: Skin is warm and dry  Psychiatric: He has a normal mood and affect  His behavior is normal    Vitals reviewed  I have personally reviewed pertinent films in PACS and my interpretation is as follows:  X-rays of the left knee demonstrate severe tricompartmental osteoarthritis  There is also post surgical artifacts from a previous surgery that have migrated posteriorly into the distal thigh and superiorly in the distal anterior thigh  X-rays of the right knee demonstrate severe tricompartmental osteoarthritis

## 2018-06-20 ENCOUNTER — OFFICE VISIT (OUTPATIENT)
Dept: PHYSICAL THERAPY | Facility: CLINIC | Age: 78
End: 2018-06-20
Payer: MEDICARE

## 2018-06-20 DIAGNOSIS — M48.061 SPINAL STENOSIS AT L4-L5 LEVEL: Primary | ICD-10-CM

## 2018-06-20 PROCEDURE — 97140 MANUAL THERAPY 1/> REGIONS: CPT | Performed by: PHYSICAL THERAPIST

## 2018-06-20 PROCEDURE — 97110 THERAPEUTIC EXERCISES: CPT | Performed by: PHYSICAL THERAPIST

## 2018-06-20 NOTE — PROGRESS NOTES
Daily Note     Today's date: 2018  Patient name: Kumar Grigsby  : 1940  MRN: [id number]  Referring provider: Jin Luong MD  Dx:   Encounter Diagnosis     ICD-10-CM    1  Spinal stenosis at L4-L5 level M48 061                   Subjective: Pt reports he had s knee injection which helped his knee pain significantly  LBP today = 3/10        Objective: See treatment diary below     Precautions - Hypertension, pacemaker     Specialty Daily Treatment Diary      Manual  18     STM to L-S    5 min  6 min  6 min     Stretch HS and psoas    6 min  5 min  6 min     Right leg pull    2 min 2 min  2 min                                       Exercise Diary              NuStep    5 min  5 min  6 min     Lunge psoas stretch    10x  10   10x     Knee ext with df    10x  20  20x     LTR    10x  20  20x     TBand pullover    10x  20 X gr  20x  green     Pelvic tilt             glute sets    20x  20 20x     Clamshells    20x  blue  20 X blue  20x  blue      Ball squeeze        20x                                                                                                                                                                     Modalities             MH   10 min  10 min  10 min                   Visit # 1  2 3 4          Assessment:  Bilateral HS are tight  Plan: Continue per plan of care

## 2018-06-25 ENCOUNTER — OFFICE VISIT (OUTPATIENT)
Dept: PHYSICAL THERAPY | Facility: CLINIC | Age: 78
End: 2018-06-25
Payer: MEDICARE

## 2018-06-25 DIAGNOSIS — M48.061 SPINAL STENOSIS AT L4-L5 LEVEL: Primary | ICD-10-CM

## 2018-06-25 PROCEDURE — 97110 THERAPEUTIC EXERCISES: CPT

## 2018-06-25 PROCEDURE — 97140 MANUAL THERAPY 1/> REGIONS: CPT

## 2018-06-25 NOTE — PROGRESS NOTES
Daily Note     Today's date: 2018  Patient name: Jerry Holliday  : 1940  MRN: [id number]  Referring provider: Rosaline Agustin MD  Dx:   Encounter Diagnosis     ICD-10-CM    1  Spinal stenosis at L4-L5 level M48 061                   Subjective: 1/10 LBP; Pt states the Dr says the pain in his legs is coming from his back       Objective: See treatment diary below       Precautions - Hypertension, pacemaker     Specialty Daily Treatment Diary      Manual  18   STM to L-S    5 min  6 min  6 min  10 min    Stretch HS and psoas    6 min  5 min  6 min  HS & add x 6min    Right leg pull    2 min 2 min  2 min                                       Exercise Diary       18   NuStep    5 min  5 min  6 min  6 min    Lunge psoas stretch    10x  10   10x  5x on the right & 2 x  (L)    Knee ext with df    10x  20  20x  20 x ea    LTR    10x  20  20x 20 x   TBand pullover    10x  20 X gr  20x  green  20 x green    Pelvic tilt             glute sets    20x  20 20x  20x    Clamshells    20x  blue  20 X blue  20x  blue 20x     Ball squeeze        20x  20x               prostretch 5 x 10 sec ea                                                                                                                                                      Modalities         MH   10 min  10 min  10 min  10 min                  Visit # 1  2 3 4  5            Assessment: Tolerated treatment well  Patient would benefit from continued PT; pt with significant bilateral  restriction HS, adductor, hip flexor & gastrocs; tender with palpation right L/S with soft tissue mobilization; difficulty performing stance right psoas stretch due to limited knee flexion on the left       Plan: Continue per plan of care   Add hip/knee flexion with peanut for spinal stenosis; pt to trial self supine  HS stretch with belt/rope  at home

## 2018-06-26 DIAGNOSIS — F06.4 ANXIETY DISORDER DUE TO MEDICAL CONDITION: ICD-10-CM

## 2018-06-27 ENCOUNTER — OFFICE VISIT (OUTPATIENT)
Dept: PHYSICAL THERAPY | Facility: CLINIC | Age: 78
End: 2018-06-27
Payer: MEDICARE

## 2018-06-27 ENCOUNTER — OFFICE VISIT (OUTPATIENT)
Dept: PODIATRY | Facility: CLINIC | Age: 78
End: 2018-06-27
Payer: MEDICARE

## 2018-06-27 VITALS
RESPIRATION RATE: 18 BRPM | WEIGHT: 315 LBS | HEIGHT: 73 IN | HEART RATE: 69 BPM | DIASTOLIC BLOOD PRESSURE: 80 MMHG | BODY MASS INDEX: 41.75 KG/M2 | SYSTOLIC BLOOD PRESSURE: 147 MMHG

## 2018-06-27 DIAGNOSIS — M25.572 PAIN IN JOINTS OF BOTH FEET: Primary | ICD-10-CM

## 2018-06-27 DIAGNOSIS — B35.9 DERMATOPHYTOSIS: ICD-10-CM

## 2018-06-27 DIAGNOSIS — I70.209 PERIPHERAL ARTERIOSCLEROSIS (HCC): ICD-10-CM

## 2018-06-27 DIAGNOSIS — M54.16 RADICULOPATHY, LUMBAR REGION: ICD-10-CM

## 2018-06-27 DIAGNOSIS — M25.571 PAIN IN JOINTS OF BOTH FEET: Primary | ICD-10-CM

## 2018-06-27 DIAGNOSIS — M48.061 SPINAL STENOSIS AT L4-L5 LEVEL: Primary | ICD-10-CM

## 2018-06-27 PROCEDURE — 97140 MANUAL THERAPY 1/> REGIONS: CPT

## 2018-06-27 PROCEDURE — 97110 THERAPEUTIC EXERCISES: CPT

## 2018-06-27 PROCEDURE — 99213 OFFICE O/P EST LOW 20 MIN: CPT | Performed by: PODIATRIST

## 2018-06-27 RX ORDER — PAROXETINE HYDROCHLORIDE 20 MG/1
TABLET, FILM COATED ORAL
Qty: 30 TABLET | Refills: 0 | Status: SHIPPED | OUTPATIENT
Start: 2018-06-27 | End: 2020-03-16

## 2018-06-27 NOTE — PROGRESS NOTES
Daily Note     Today's date: 2018  Patient name: Lydia Tay  : 1940  MRN: [id number]  Referring provider: Swati Schafer MD  Dx:   Encounter Diagnosis     ICD-10-CM    1  Spinal stenosis at L4-L5 level M48 061                   Subjective:  0/10 LBP at present; has increased pain in the left knee today; pt states he used an old pair of suspenders to stretch his HS at home; still has a lot of tightness in his calves; right LB is tight       Objective: See treatment diary below       Precautions - Hypertension, pacemaker     Specialty Daily Treatment Diary      Manual  18   STM to L-S  x 10 min   5 min  6 min  6 min  10 min    Stretch HS and psoas  3 x 30 sec ea demetrius HS   6 min  5 min  6 min  HS & add x 6min    Right leg pull    2 min 2 min  2 min                                       Exercise Diary       18   NuStep  6 min   5 min  5 min  6 min  6 min    Lunge psoas stretch  not today due to knee pain   10x  10   10x  5x on the right & 2 x  (L)    Knee ext with df  20 x ea  10x  20  20x  20 x ea    LTR  20 x ea   10x  20  20x 20 x   TBand pullover  blk x 20   10x  20 X gr  20x  green  20 x green    Pelvic tilt  2 x 10            glute sets  20 x   20x  20 20x  20x    Clamshells  20 x blue/blk next visit    20x    20 X blue  20x  blue 20x     Ball squeeze  20x       20x  20x     prostretch   5 x 10 sec ea         prostretch 5 x 10 sec ea     seated gastroc stretch w SOS foot on stool  5 x 10 sec ea             L/S flexion seated  with PBall  X 10 hold 5s                 instruct runners stretch                                                                                                                  Modalities         MH   Deferred   10 min  10 min  10 min                  Visit # 1  2 3 4  5              Assessment: Tolerated treatment well   Patient would benefit from continued PT; pt with good tolerance to seated L/S flexion with ball & pelvic tilts; pt with significant bilateral  gastroc tightness       Plan: Continue per plan of care   Pt to add seated gastroc stretch with strap to home program; instruct runners stretch next session

## 2018-06-27 NOTE — PROGRESS NOTES
Procedures   Foot Exam     Signed  Encounter Date: 4/17/2018   Assessment/Plan:     No problem-specific Assessment & Plan notes found for this encounter      Acquired deformity of foot, unspecified laterality     Pain in joints of both feet     Dermatophytosis     Peripheral arteriosclerosis (HCC)     Pain in both feet     Onychomycosis        Subjective:       Patient ID: Giovanny Salazar is a 68 y  o  male      HPI     The following portions of the patient's history were reviewed and updated as appropriate: allergies, current medications, past family history, past medical history, past social history, past surgical history and problem listAssessment  1  Acquired ankle/foot deformity (736 70) (M21 969)   2  Onychomycosis (110 1) (B35 1)   3  Difficulty in walking (719 7) (R26 2)     Plan      · Follow-up visit in 9 weeks Evaluation and Treatment  Follow-up  Status: Hold For -Scheduling  Requested for: 22Nov2017   · There are many things you can do at home to ensure good foot health ; Status:Complete;  Done: 85VXY4849 11:09AM   · Wear shoes that give your toes plenty of room ; Status:Complete;   Done: 60NYM659258:08XU     Discussion/Summary  The patient, patient's family was counseled regarding instructions for management,-- patient and family education  Patient is able to Self-Care  The treatment plan was reviewed with the patient/guardian  The patient/guardian understands and agrees with the treatment plan      Chief Complaint  Patient has pain in both feet       History of Present Illness  HPI: Agent has continued bilateral lower extremity pain  He is taking Lyrica  He takes Percocet when necessary  He has back pain  His legs feel heavy and weak       Review of Systems   Constitutional: no fever-- and-- no chills    ENT: no nasal discharge   Cardiovascular: no palpitations   Gastrointestinal: no constipation   Genitourinary: no urinary hesitancy   Integumentary: no rashes   Neurological: no headache-- and-- no dizziness   Other Symptoms: anxiety insomnia       Active Problems     1  Abrasion (919 0) (T14 8XXA)   2  Acquired ankle/foot deformity (736 70) (M21 969)   3  Acute gout (274 01) (M10 9)   4  Acute gouty arthritis (274 01) (M10 9)   5  Allergic rhinitis (477 9) (J30 9)   6  Aneurysm of abdominal aorta (441 4) (I71 4)   7  Angina pectoris (413 9) (I20 9)   8  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) (F06 4)   9  Arteriosclerosis of coronary artery (414 00) (I25 10)   10  Arthralgia of ankle or foot (719 47) (M25 579)   11  Atherosclerosis of arteries of extremities (440 20) (I70 209)   12  Atrial fibrillation, unspecified type (427 31) (I48 91)   13  Atrial flutter (427 32) (I48 92)   14  Benign hypertension with CKD (chronic kidney disease) stage III (568 70,841  3)  (I12 9,N18 3)   15  Benign prostatic hypertrophy (600 00) (N40 0)   16  Bilateral edema of lower extremity (782 3) (R60 0)   17  Bilateral lumbar radiculopathy (724 4) (M54 16)   18  Blurred vision (368 8) (H53 8)   19  Body mass index (BMI) of 45 0 to 49 9 in adult (V85 42) (Z68 42)   20  Callus (700) (L84)   21  Carpal tunnel syndrome, unspecified laterality (354 0) (G56 00)   22  Cellulitis (682 9) (L03 90)   23  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)   24  Chronic low back pain (724 2,338 29) (M54 5,G89 29)   25  Chronic obstructive pulmonary disease (496) (J44 9)   26  Chronic pain syndrome (338 4) (G89 4)   27  CKD (chronic kidney disease), stage III (585 3) (N18 3)   28  Colon cancer screening (V76 51) (Z12 11)   29  Colon, diverticulosis (562 10) (K57 30)   30  Congestive heart failure (428 0) (I50 9)   31  Coronary artery disease (414 00) (I25 10)   32  Deep venous insufficiency (459 81) (I87 2)   33  Degenerative disc disease, lumbar (722 52) (M51 36)   34  Difficulty in walking (719 7) (R26 2)   35  Encounter for special screening examination for genitourinary disorder (V81 6) (Z13 89)   36  Fecal soiling (927 62) (R15 1)   37  Fibromyalgia (729 1) (M79 7)   38  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 3)   39  Gastrointestinal symptoms (787 99) (R19 8)   40  Gout (274 9) (M10 9)   41  Hyperkalemia (276 7) (E87 5)   42  Hyperlipidemia (272 4) (E78 5)   43  Insomnia, persistent (307 42) (G47 00)   44  Lumbar canal stenosis (724 02) (M48 061)   45  Lumbar neuritis (724 4) (M54 16)   46  Memory loss (780 93) (R41 3)   47  Microscopic hematuria (599 72) (R31 29)   48  Moderate or severe vision impairment, one eye (369 60) (H54 40)   49  Morbid or severe obesity due to excess calories (278 01) (E66 01)   50  Morbid or severe obesity due to excess calories (278 01) (E66 01)   51  Needs flu shot (V04 81) (Z23)   52  Nicotine dependence (305 1) (F17 200)   53  Olecranon bursitis (726 33) (M70 20)   54  Onychogryphosis (703 8)   55  Onychomycosis (110 1) (B35 1)   56  Osteoarthritis, knee/lower leg (715 96) (M17 9)   57  Pacemaker Permanent Placement Dual-Chamber   58  Pain in joints of both feet (719 47) (M79 671,M79 672)   59  Paronychia of toe, unspecified laterality (681 11) (L03 039)   60  Pes planus, congenital (754 61) (Q66 50)   61  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)   62  Presence of cardiac pacemaker (V45 01) (Z95 0)   63  Proteinuria (791 0) (R80 9)   64  Radiculopathy (729 2) (M54 10)   65  Screening for genitourinary condition (V81 6) (Z13 89)   66  Skin tear of right lower leg without complication, initial encounter (891 0) (S81 811A)   67  Sleep apnea (780 57) (G47 30)   68  Sleep apnea (780 57) (G47 30)   69  Tenderness in limb (729 5) (M79 609)   70  Tendonitis (726 90) (M77 9)   71  Tinea corporis (110 5) (B35 4)   72  Urinary incontinence (788 30) (R32)   73  Venous insufficiency (chronic) (peripheral) (459 81) (I87 2)     Past Medical History   · History of Atrial flutter (427 32) (I48 92)   · History of Cellulitis of foot (682 7) (L03 119)   · History of Colon cancer screening (V76 51) (Z12 11)   · History of Difficulty in walking (719 7) (R26 2)   · History of Difficulty in walking (719 7) (R26 2)   · General medical examination (V70 9) (Z00 00)   · History of backache (V13 59) (Z87 39)   · History of radiculopathy (V12 49) (Z86 69)   · 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)     The active problems and past medical history were reviewed and updated today       Surgical History   · History of Cath Stent Placement   · History of Cholecystectomy   · History of Knee Surgery   · Pacemaker Permanent Placement Dual-Chamber   · History of Permanent Pacemaker Implantation Date     The surgical history was reviewed and updated today        Family History  Mother    · Family history of Cancer   · Family history of Family Health Status Of Mother -    · Family history of Heart Disease (V17 49)   · Family history of Hypertension (V17 49)   · Family history of Mother  At Age 80  Father    · Family history of Father  At Age 47   · Family history of Heart Disease (V17 49)  Brother    · Family history of Heart Disease (V17 49)     The family history was reviewed and updated today        Social History      · Denied: Alcohol Use (History)   · Being A Social Drinker   · Cigarette smoker (305 1) (F17 210)   · Current Every Day Smoker (305 1)   · Current Smoker (V15 82)   · Daily Tea Consumption (10  Cups/Day)   · Denied: Drug Use (305 90)   · Marital History - Currently    · Recently Stopping Smoking (V15 82)  The social history was reviewed and updated today  The social history was reviewed and is unchanged       Current Meds   1  Allopurinol 100 MG Oral Tablet; TAKE ONE TABLET BY MOUTH EVERY DAY FOR GOUT; Therapy: 98ZGH7906 to (Evaluate:03Ncq5817)  Requested for: 2017; Last Rx:2017 Ordered   2  Amoxicillin 500 MG Oral Tablet; 4 po 1 hour prior to procedure  Requested for: 00CJJ0029; Last Rx:2017 Ordered   3  B Complex Oral Capsule; TAKE 1 CAPSULE DAILY;  Therapy: (Recorded:2014) to Recorded   4  Betamethasone Dipropionate 0 05 % External Cream; APPLY SPARINGLY TO AFFECTED AREA(S) TWICE DAILY; Therapy: 69Vqb8783 to (Last Rx:73Srb2886)  Requested for: 19Xni3046 Ordered   5  Biotin 1000 MCG Oral Tablet; Take 1 tablet daily; Therapy: (Recorded:08Apr2014) to Recorded   6  Calcium-Carb 600 600 MG Oral Tablet; TAKE 2 TABLETS DAILY; Therapy: 43HPT3832 to  Requested for: 50Xqs2652 Recorded   7  Centrum Silver Ultra Mens Oral Tablet; 1 tab daily; Therapy: 76VZC9145 to  Requested for: 71QXU4121 Recorded   8  Cinnamon 500 MG Oral Capsule; Take 1 capsule twice daily; Therapy: (Recorded:08Apr2014) to Recorded   9  ClonazePAM 1 MG Oral Tablet Disintegrating; PLACE 1 TABLET ON TONGUE AND ALLOW TO DISSOLVE TWICE DAILY AS NEEDED; Therapy: 49EJD5911 to (Evaluate:16Sep2016); Last Rx:15Yib8637 Ordered   10  Clopidogrel Bisulfate 75 MG Oral Tablet; take one tablet by mouth every day;  Therapy: 16HWZ7657 to (Evaluate:40Bdh1243)  Requested for: 11Aug2017; Last  Rx:11Aug2017 Ordered   11  Clotrimazole 1 % External Cream; APPLY 2-3 TIMES DAILY TO AFFECTED AREA(S);  Therapy: 07Ijy4564 to (Last Rx:21Aug2017)  Requested for: 90Ukj5247 Ordered   12  Colcrys 0 6 MG Oral Tablet; TAKE 1 TABLET DAILY AS DIRECTED;  Therapy: 64FPS1267 to (Evaluate:21Jun2016); Last Rx:24Mar2016 Ordered   13  Cranberry Fruit Concentrate 95781 MG Oral Capsule;  Take 1 capsule twice daily  Recorded   14  [employer] Fish Oil CAPS; TAKE 1 CAPSULE Daily Recorded   15  Donepezil HCl - 5 MG Oral Tablet; 1 every day;  Therapy: 69Nnn3391 to (Last Rx:28Vim9825)  Requested for: 46Lwc4993 Ordered   16  Felodipine ER 5 MG Oral Tablet Extended Release 24 Hour; take 1 tablet by mouth once Lo Puff: 66AEJ5143 to (ARCDI:34VTX6772)  Requested for: 63Tdk0894; Last  Rx:86Jfe0689 Ordered   17  Finasteride 5 MG Oral Tablet; 1 DAILY;  Therapy: 31EQL1584 to (Last Rx:09Hbf6015)  Requested for: 16ZOL3286 Ordered   18  Flax Seed Oil 1000 MG Oral Capsule; 2 tabs daily; Nigel Last: 99BHN3635 to  Requested for: 57KMU0068 Recorded   19  Ginkgo Biloba 120 MG Oral Capsule; Take 1 capsule twice daily;  Therapy: (Recorded:21Apr2014) to Recorded   20  Glucosamine 1500 Complex Oral Capsule; TAKE 2 CAPSULE Twice daily;  Therapy: (Recorded:08Apr2014) to Recorded   21  Lactaid U Trati 1724; CHEW AND SWALLOW 1 TO 3 TABLETS WITH FIRST BITE  OF MILK FOOD;  Therapy: (Recorded:21Apr2014) to Recorded   22  Magnesium 500 MG Oral Capsule; take 1 capsule daily;  Therapy: (Recorded:21Apr2014) to Recorded   23  Nitrostat 0 4 MG Sublingual Tablet Sublingual; DISSOLVE 1 TABLET UNDER THE  TONGUE AS NEEDED FOR CHEST PAIN;  Therapy: 19REF4907 to (Evaluate:29Oct2015)  Requested for: 35LAN7517; Last  Rx:25Sne0433 Ordered   24  PARoxetine HCl - 20 MG Oral Tablet; 1 EVERY MORNING;  Therapy: 27XKS7225 to (Last Rx:86Yru5578)  Requested for: 37Eja7317 Ordered   25  ProAir  (90 Base) MCG/ACT Inhalation Aerosol Solution; 2 puffs Q4 hours prn  SOB/wheeze;  Therapy: 66PLZ5216 to (Last Rx:19Nov2013)  Requested for: 24MRJ2684 Ordered   26  Selenium  MCG Oral Tablet Extended Release; 1 tab daily;  Therapy: 80EJR8871 to  Requested for: 81YEV3047 Recorded   27  Silver Sulfadiazine 1 % External Cream; APPLY TO AFFECTED AREA TWICE DAILY AS [facility]: 18LVG6109 to (Last Rx:06Apr2016)  Requested for: 07DOE5473 Ordered   28  Simvastatin 20 MG Oral Tablet; 1 TAB DAILY;  Therapy: 31KSN4843 to (Last Rx:41Hbm4113)  Requested for: 52Zlv8559 Ordered   29  TraMADol HCl - 50 MG Oral Tablet; TAKE 1 TABLET 4 TIMES DAILY AS NEEDED FOR  PAIN; Last Rx:11Nih0716 Ordered   30  Vitamin C 500 MG Oral Tablet; TAKE 1 TABLET DAILY;  Therapy: (Recorded:77Bvm5781) to Recorded   31  Vitamin D 1000 UNIT Oral Tablet; TAKE 1 TABLET DAILY;  Therapy: (Recorded:08Apr2014) to Recorded   32  Xarelto 20 MG Oral Tablet;  One a day;  Therapy: 24UTQ9856 to (Last Rx:31Yev5968) Ordered   33  Zinc 50 MG Oral Tablet; TAKE 1 TABLET DAILY;  Therapy: (Recorded:08Apr2014) to Recorded     The medication list was reviewed and updated today        Allergies  1  No Known Drug Allergies     Vitals        Heart Rate 86   Respiration 20   Systolic 121   Diastolic 84   Height 6 ft    Weight 360 lb    BMI Calculated 48 83   BSA Calculated 2 73      Physical Exam  Left Foot: Appearance: Normal except as noted: excessive pronation-- and-- pes planus  Second toe deformities include hammer toe  Third toe deformities include hammer toe  Forth toe deformities include hammer toe  Fifth toe deformities include hammer toe  Tenderness: None except the great toe,-- distal first metatarsal,-- distal fifth metatarsal-- and-- insertion of the plantar fascia  Positive Brian sign third left intermetatarsal space  ROM: Full  Motor: Normal   Right Foot: Appearance: Normal except as noted: excessive pronation-- and-- pes planus  Second toe deformities include hammer toe  Third toe deformities include hammer toe  Forth toe deformities include hammer toe  Fifth toe deformities include hammer toe  Evaluation of the great toe nail demonstrates an ingrown nail  Tenderness: None except the insertion of the plantar fascia  Left Ankle: Appearance: Normal except erythema,-- swelling-- and-- swelling laterally  Tenderness: None except the tibiotalar joint  Right Ankle: ROM: Full  Neurological Exam: performed  Deep tendon reflexes: + tinel of right tibial nerve  Vascular Exam: performed Dorsalis pedis pulses were present bilaterally  Posterior tibial pulses were present bilaterally  Elevation Pallor: absent bilaterally  Capillary refill time was less than 1 second bilaterally  Edema: moderate bilaterally-- and-- 4/7 pitting  neg  buddy  Toenails: All of the toenails were elongated,-- hypertrophied,-- discolored,-- ingrown,-- shown to have subungual debris,-- tender-- and-- Mycotic with onychauxis     Hyperkeratosis: present on both first toes,-- present on both first sub metatarsals-- and-- present on both fifth sub metatarsals  Shoe Gear Evaluation: performed ()  Recommendation(s): extra depth diabetic shoes       Procedure  All abnormal skin a mycotic nails debrided without complication her pain  Gentian violet applied to webspace              Patient ID: Bright Arguelles is a 66 y o  male

## 2018-07-02 ENCOUNTER — OFFICE VISIT (OUTPATIENT)
Dept: PHYSICAL THERAPY | Facility: CLINIC | Age: 78
End: 2018-07-02
Payer: MEDICARE

## 2018-07-02 DIAGNOSIS — M48.061 SPINAL STENOSIS AT L4-L5 LEVEL: Primary | ICD-10-CM

## 2018-07-02 PROCEDURE — 97110 THERAPEUTIC EXERCISES: CPT

## 2018-07-02 PROCEDURE — 97140 MANUAL THERAPY 1/> REGIONS: CPT

## 2018-07-02 NOTE — PROGRESS NOTES
Daily Note     Today's date: 2018  Patient name: Juan Manuel Glass  : 1940  MRN: [id number]  Referring provider: Aaron Carlson MD  Dx:   Encounter Diagnosis     ICD-10-CM    1  Spinal stenosis at L4-L5 level M48 061                   Subjective:  Reports 4/10 LBP with 7/10 L knee pain      Objective: See treatment diary below       Precautions - Hypertension, pacemaker     Specialty Daily Treatment Diary      Manual  18       STM to L-S 10 min       Stretch HS and psoas 5 min       Right leg pull                                           Exercise Diary         NuStep 5 min       Lunge psoas stretch 5 x 10       Knee ext with df 20       LTR 20       TBand pullover        Pelvic tilt 20       glute sets 20       Clamshells         Ball squeeze      20x     prostretch       prostretch 5 x 10 sec ea     seated gastroc stretch w SOS foot on stool  10 X 10 sec         L/S flexion seated  with PBall  X 10 hold 5s                                                                                                                                  Modalities         MH  10 min                 Visit # 6                  Assessment: Tolerated treatment well  Patient is very deconditioned some exercise were held secondary to having poision ivy on his leg  Very tight bilat LE ambulates  with antalgic gait       Plan: Continue per plan of care   Pt to add seated gastroc stretch with strap to home program; instruct runners stretch next session

## 2018-07-09 ENCOUNTER — TELEPHONE (OUTPATIENT)
Dept: OBGYN CLINIC | Facility: HOSPITAL | Age: 78
End: 2018-07-09

## 2018-07-09 ENCOUNTER — OFFICE VISIT (OUTPATIENT)
Dept: PHYSICAL THERAPY | Facility: CLINIC | Age: 78
End: 2018-07-09
Payer: MEDICARE

## 2018-07-09 DIAGNOSIS — M48.061 SPINAL STENOSIS AT L4-L5 LEVEL: Primary | ICD-10-CM

## 2018-07-09 PROCEDURE — G8979 MOBILITY GOAL STATUS: HCPCS | Performed by: PHYSICAL THERAPIST

## 2018-07-09 PROCEDURE — 97140 MANUAL THERAPY 1/> REGIONS: CPT | Performed by: PHYSICAL THERAPIST

## 2018-07-09 PROCEDURE — G8978 MOBILITY CURRENT STATUS: HCPCS | Performed by: PHYSICAL THERAPIST

## 2018-07-09 PROCEDURE — 97110 THERAPEUTIC EXERCISES: CPT | Performed by: PHYSICAL THERAPIST

## 2018-07-09 NOTE — PROGRESS NOTES
Daily Note     Today's date: 2018  Patient name: Eric Caruso  : 1940  MRN: [id number]  Referring provider: Fernie Castro MD  Dx:   Encounter Diagnosis     ICD-10-CM    1  Spinal stenosis at L4-L5 level M48 061                   Subjective:  Reports 4/10 LBP with 7/10 L knee pain      Objective: See treatment diary below       Precautions - Hypertension, pacemaker     Specialty Daily Treatment Diary      Manual  18      STM to L-S 10 min 7 min      Stretch HS and psoas 5 min 6  min      Right leg pull  2 min                                         Exercise Diary         NuStep 5 min 5 min      Lunge psoas stretch 5 x 10 5x      Knee ext with df 20 20x      LTR 20 20x      TBand pullover  20x  blue      Pelvic tilt 20       glute sets 20 20x      Clamshells  20x  blue       Ball squeeze  20x       prostretch          seated gastroc stretch w SOS foot on stool  10 X 10 sec 10x        L/S flexion seated  with PBall  X 10 hold 5s   10x                                                                                                                               Modalities         MH  10 min 10 min                Visit # 6 7                 Assessment:  His gait is significantly antalgic today due to Left knee pain      Plan: Continue per plan of care

## 2018-07-10 ENCOUNTER — APPOINTMENT (OUTPATIENT)
Dept: PHYSICAL THERAPY | Facility: CLINIC | Age: 78
End: 2018-07-10
Payer: MEDICARE

## 2018-07-11 ENCOUNTER — OFFICE VISIT (OUTPATIENT)
Dept: PHYSICAL THERAPY | Facility: CLINIC | Age: 78
End: 2018-07-11
Payer: MEDICARE

## 2018-07-11 DIAGNOSIS — M48.061 SPINAL STENOSIS AT L4-L5 LEVEL: Primary | ICD-10-CM

## 2018-07-11 PROCEDURE — 97110 THERAPEUTIC EXERCISES: CPT | Performed by: PHYSICAL THERAPIST

## 2018-07-11 PROCEDURE — 97140 MANUAL THERAPY 1/> REGIONS: CPT | Performed by: PHYSICAL THERAPIST

## 2018-07-11 NOTE — PROGRESS NOTES
Daily Note     Today's date: 2018  Patient name: Logan lSoan  : 1940  MRN: [id number]  Referring provider: Andrews Landin MD  Dx:   Encounter Diagnosis     ICD-10-CM    1  Spinal stenosis at L4-L5 level M48 061                   Subjective:  Reports feeling better overall  He has no  More foot pain and less back pain  Objective: See treatment diary below       Precautions - Hypertension, pacemaker     Specialty Daily Treatment Diary      Manual  18     STM to L-S 10 min 7 min 6 min     Stretch HS and psoas 5 min 6  min HS, psoas, adductors , gastroc 7'      Right leg pull  2 min 2 min                                        Exercise Diary         NuStep 5 min 5 min 5'     Lunge psoas stretch 5 x 10 5x 5x     Knee ext with df 20 20x 20     LTR 20 20x 20x     TBand pullover  20x  blue 20x   blue     Pelvic tilt 20  20x     glute sets 20 20x 20x     Clamshells  20x  blue 20x  black      Ball squeeze  20x 20x      prostretch          seated gastroc stretch w SOS foot on stool  10 X 10 sec 10x 10x       L/S flexion seated  with PBall  X 10 hold 5s   10x 10x              DKTC with ball    20x                                                                                                               Modalities         MH  10 min 10 min 10'               Visit # 6 7 8                Assessment:  Bilateral gastroc remain tight  Plan: Continue per plan of care

## 2018-07-18 ENCOUNTER — APPOINTMENT (OUTPATIENT)
Dept: PHYSICAL THERAPY | Facility: CLINIC | Age: 78
End: 2018-07-18
Payer: MEDICARE

## 2018-07-20 ENCOUNTER — APPOINTMENT (OUTPATIENT)
Dept: PHYSICAL THERAPY | Facility: CLINIC | Age: 78
End: 2018-07-20
Payer: MEDICARE

## 2018-07-23 ENCOUNTER — APPOINTMENT (OUTPATIENT)
Dept: PHYSICAL THERAPY | Facility: CLINIC | Age: 78
End: 2018-07-23
Payer: MEDICARE

## 2018-07-23 ENCOUNTER — OFFICE VISIT (OUTPATIENT)
Dept: OBGYN CLINIC | Facility: CLINIC | Age: 78
End: 2018-07-23
Payer: MEDICARE

## 2018-07-23 VITALS — HEIGHT: 73 IN | WEIGHT: 315 LBS | BODY MASS INDEX: 41.75 KG/M2

## 2018-07-23 DIAGNOSIS — M17.0 BILATERAL PRIMARY OSTEOARTHRITIS OF KNEE: Primary | ICD-10-CM

## 2018-07-23 PROCEDURE — 20610 DRAIN/INJ JOINT/BURSA W/O US: CPT | Performed by: PHYSICIAN ASSISTANT

## 2018-07-23 RX ORDER — HYALURONATE SODIUM 10 MG/ML
20 SYRINGE (ML) INTRAARTICULAR
Status: COMPLETED | OUTPATIENT
Start: 2018-07-23 | End: 2018-07-23

## 2018-07-23 RX ADMIN — Medication 20 MG: at 08:58

## 2018-07-23 NOTE — PROGRESS NOTES
Assessment/Plan:  1  Bilateral primary osteoarthritis of knee         Follow-up 1 week for euflexxa #2 left knee  Subjective:   Eric Caruso is a 66 y o  male who presents today for euflexxa #1 left knee  Review of Systems      Past Medical History:   Diagnosis Date    Arthritis     Chronic kidney disease     stage 3    Coronary artery disease     2 stents    Fluid retention     Gout     Heart failure ([address] )     pacemaker    Hypertension     Pacemaker     Pulmonary emphysema (HCC)     Shortness of breath     exertion    Sleep apnea     c pap       Past Surgical History:   Procedure Laterality Date    ANGIOPLASTY      x2 2 stents and then replaced    CARDIAC SURGERY      pacemaker    CHOLECYSTECTOMY      EPIDURAL BLOCK INJECTION N/A 5/26/2016    Procedure: BLOCK / INJECTION EPIDURAL STEROID LUMBAR  L4-5;  Surgeon: Silvano Hicks MD;  [facility] MAIN [facility];  Service:     EYE SURGERY      cataract left    KNEE ARTHROSCOPY W/ MENISCAL REPAIR Left     LUMBAR EPIDURAL INJECTION N/A 3/17/2016    Procedure: BLOCK / INJECTION LUMBAR  L4-5  (C-ARM); Surgeon: Silvano Hicks MD;  [facility];  Service:        Family History   Problem Relation Age of Onset    No Known Problems Mother     Heart disease Father        Social History     Occupational History    RETIRED      Social History Main Topics    Smoking status: Former Smoker     Packs/day: 1 00     Years: 50 00     Quit date: 2015    Smokeless tobacco: Never Used    Alcohol use No    Drug use: No    Sexual activity: Not on file         Current Outpatient Prescriptions:     albuterol (VENTOLIN HFA) 90 mcg/act inhaler, Inhale 2 puffs every 6 (six) hours as needed for wheezing, Disp: 1 Inhaler, Rfl: 6    allopurinol (ZYLOPRIM) 100 mg tablet, Take 1 tablet (100 mg total) by mouth daily, Disp: 90 tablet, Rfl: 3    ascorbic acid (VITAMIN C) 500 mg tablet, Take 500 mg by mouth daily  , Disp: , Rfl:     B Complex Vitamins (B COMPLEX 1 PO), Take 1 tablet by mouth daily  , Disp: , Rfl:     Biotin (BIOTIN 5000) 5 MG CAPS, Take 1,000 mcg by mouth daily  , Disp: , Rfl:     calcium carbonate-vitamin D (OSCAL-D) 500 mg-200 units per tablet, Take 2 tablets by mouth daily at bedtime  , Disp: , Rfl:     clopidogrel (PLAVIX) 75 mg tablet, TAKE 1 TABLET DAILY, Disp: 90 tablet, Rfl: 3    collagenase (SANTYL) ointment, Apply 1 application topically daily  , Disp: , Rfl:     Cranberry 1000 MG CAPS, Take 1 tablet by mouth 2 (two) times a day , Disp: , Rfl:     felodipine (PLENDIL) 5 mg 24 hr tablet, TAKE 1 TABLET DAILY, Disp: 90 tablet, Rfl: 3    finasteride (PROSCAR) 5 mg tablet, Take 1 tablet (5 mg total) by mouth daily, Disp: 90 tablet, Rfl: 3    Flaxseed, Linseed, (EQL FLAX SEED OIL) 1000 MG CAPS, Take by mouth daily  , Disp: , Rfl:     furosemide (LASIX) 40 mg tablet, Take 40 mg by mouth daily as needed  , Disp: , Rfl:     gabapentin (NEURONTIN) 100 mg capsule, Take 1 capsule (100 mg total) by mouth 3 (three) times a day for 30 days, Disp: 90 capsule, Rfl: 0    glucosamine-chondroitin 500-400 MG tablet, Take 1 tablet by mouth 2 (two) times a day , Disp: , Rfl:     lactase (LACTAID) 3,000 units tablet, Take 3,000 Units by mouth as needed  , Disp: , Rfl:     Omega-3 Fatty Acids (FISH OIL) 1,000 mg, Take 1,000 mg by mouth 2 (two) times a day , Disp: , Rfl:     PARoxetine (PAXIL) 20 mg tablet, TAKE 1/2 TABLET BY MOUTH DAILY, Disp: 30 tablet, Rfl: 0    PARoxetine (PAXIL) 20 mg tablet, TAKE 1/2 TABLET BY MOUTH DAILY, Disp: 30 tablet, Rfl: 0    psyllium (METAMUCIL) 58 6 % powder, Take 1 packet by mouth daily Indications: 1 tsp , Disp: , Rfl:     rivaroxaban (XARELTO) tablet, Take 20 mg by mouth , Disp: , Rfl:     simvastatin (ZOCOR) 20 mg tablet, Take 1 tablet (20 mg total) by mouth daily at bedtime, Disp: 90 tablet, Rfl: 3    UNABLE TO FIND, 100 Units daily  Med Name: vit d3, Disp: , Rfl:     No Known Allergies    Objective:   There were no vitals filed for this visit      Ortho Exam    Physical Exam    Large joint arthrocentesis  Date/Time: 7/23/2018 8:58 AM  Consent given by: patient  Site marked: site marked  Supporting Documentation  Indications: pain   Procedure Details  Location: knee - L knee  Needle size: 22 G  Ultrasound guidance: no  Approach: anterolateral  Medications administered: 20 mg Sodium Hyaluronate 20 MG/2ML    Patient tolerance: patient tolerated the procedure well with no immediate complications  Dressing:  Sterile dressing applied

## 2018-07-25 ENCOUNTER — APPOINTMENT (OUTPATIENT)
Dept: PHYSICAL THERAPY | Facility: CLINIC | Age: 78
End: 2018-07-25
Payer: MEDICARE

## 2018-07-26 NOTE — PROGRESS NOTES
PT Discharge    Patient has called to self D/C from PT due to feeling it is not working     D/C at this time as requested by patient

## 2018-07-30 ENCOUNTER — OFFICE VISIT (OUTPATIENT)
Dept: OBGYN CLINIC | Facility: CLINIC | Age: 78
End: 2018-07-30
Payer: MEDICARE

## 2018-07-30 VITALS
WEIGHT: 315 LBS | DIASTOLIC BLOOD PRESSURE: 74 MMHG | SYSTOLIC BLOOD PRESSURE: 123 MMHG | HEART RATE: 74 BPM | BODY MASS INDEX: 41.75 KG/M2 | HEIGHT: 73 IN

## 2018-07-30 DIAGNOSIS — M17.0 BILATERAL PRIMARY OSTEOARTHRITIS OF KNEE: Primary | ICD-10-CM

## 2018-07-30 PROCEDURE — 20610 DRAIN/INJ JOINT/BURSA W/O US: CPT | Performed by: PHYSICIAN ASSISTANT

## 2018-07-30 RX ORDER — HYALURONATE SODIUM 10 MG/ML
20 SYRINGE (ML) INTRAARTICULAR
Status: COMPLETED | OUTPATIENT
Start: 2018-07-30 | End: 2018-07-30

## 2018-07-30 RX ADMIN — Medication 20 MG: at 09:51

## 2018-07-30 NOTE — PROGRESS NOTES
Assessment/Plan:  1  Bilateral primary osteoarthritis of knee         Follow-up 1 week  Subjective:   Fermin Schwartz is a 66 y o  male who presents today for euflexxa #2 left knee  Review of Systems      Past Medical History:   Diagnosis Date    Arthritis     Chronic kidney disease     stage 3    Coronary artery disease     2 stents    Fluid retention     Gout     Heart failure ([address] )     pacemaker    Hypertension     Pacemaker     Pulmonary emphysema (HCC)     Shortness of breath     exertion    Sleep apnea     c pap       Past Surgical History:   Procedure Laterality Date    ANGIOPLASTY      x2 2 stents and then replaced    CARDIAC SURGERY      pacemaker    CHOLECYSTECTOMY      EPIDURAL BLOCK INJECTION N/A 5/26/2016    Procedure: BLOCK / INJECTION EPIDURAL STEROID LUMBAR  L4-5;  Surgeon: Francisco Arevalo MD;  Location: [facility] MAIN OR;  Service:     EYE SURGERY      cataract left    KNEE ARTHROSCOPY W/ MENISCAL REPAIR Left     LUMBAR EPIDURAL INJECTION N/A 3/17/2016    Procedure: BLOCK / INJECTION LUMBAR  L4-5  (C-ARM); Surgeon: Francisco Arevalo MD;  Location: [facility] MAIN OR;  Service:        Family History   Problem Relation Age of Onset    No Known Problems Mother     Heart disease Father        Social History     Occupational History    RETIRED      Social History Main Topics    Smoking status: Former Smoker     Packs/day: 1 00     Years: 50 00     Quit date: 2015    Smokeless tobacco: Never Used    Alcohol use No    Drug use: No    Sexual activity: Not on file         Current Outpatient Prescriptions:     albuterol (VENTOLIN HFA) 90 mcg/act inhaler, Inhale 2 puffs every 6 (six) hours as needed for wheezing, Disp: 1 Inhaler, Rfl: 6    allopurinol (ZYLOPRIM) 100 mg tablet, Take 1 tablet (100 mg total) by mouth daily, Disp: 90 tablet, Rfl: 3    ascorbic acid (VITAMIN C) 500 mg tablet, Take 500 mg by mouth daily  , Disp: , Rfl:     B Complex Vitamins (B COMPLEX 1 PO), Take 1 tablet by mouth daily  , Disp: , Rfl:     Biotin (BIOTIN 5000) 5 MG CAPS, Take 1,000 mcg by mouth daily  , Disp: , Rfl:     calcium carbonate-vitamin D (OSCAL-D) 500 mg-200 units per tablet, Take 2 tablets by mouth daily at bedtime  , Disp: , Rfl:     clopidogrel (PLAVIX) 75 mg tablet, TAKE 1 TABLET DAILY, Disp: 90 tablet, Rfl: 3    collagenase (SANTYL) ointment, Apply 1 application topically daily  , Disp: , Rfl:     Cranberry 1000 MG CAPS, Take 1 tablet by mouth 2 (two) times a day , Disp: , Rfl:     felodipine (PLENDIL) 5 mg 24 hr tablet, TAKE 1 TABLET DAILY, Disp: 90 tablet, Rfl: 3    finasteride (PROSCAR) 5 mg tablet, Take 1 tablet (5 mg total) by mouth daily, Disp: 90 tablet, Rfl: 3    Flaxseed, Linseed, (EQL FLAX SEED OIL) 1000 MG CAPS, Take by mouth daily  , Disp: , Rfl:     furosemide (LASIX) 40 mg tablet, Take 40 mg by mouth daily as needed  , Disp: , Rfl:     glucosamine-chondroitin 500-400 MG tablet, Take 1 tablet by mouth 2 (two) times a day , Disp: , Rfl:     lactase (LACTAID) 3,000 units tablet, Take 3,000 Units by mouth as needed  , Disp: , Rfl:     Omega-3 Fatty Acids (FISH OIL) 1,000 mg, Take 1,000 mg by mouth 2 (two) times a day , Disp: , Rfl:     PARoxetine (PAXIL) 20 mg tablet, TAKE 1/2 TABLET BY MOUTH DAILY, Disp: 30 tablet, Rfl: 0    PARoxetine (PAXIL) 20 mg tablet, TAKE 1/2 TABLET BY MOUTH DAILY, Disp: 30 tablet, Rfl: 0    psyllium (METAMUCIL) 58 6 % powder, Take 1 packet by mouth daily Indications: 1 tsp , Disp: , Rfl:     rivaroxaban (XARELTO) tablet, Take 20 mg by mouth , Disp: , Rfl:     simvastatin (ZOCOR) 20 mg tablet, Take 1 tablet (20 mg total) by mouth daily at bedtime, Disp: 90 tablet, Rfl: 3    UNABLE TO FIND, 100 Units daily   Med Name: vit d3, Disp: , Rfl:     gabapentin (NEURONTIN) 100 mg capsule, Take 1 capsule (100 mg total) by mouth 3 (three) times a day for 30 days, Disp: 90 capsule, Rfl: 0    No Known Allergies    Objective:  Vitals:    07/30/18 0939   BP: 123/74 Pulse: 74       Ortho Exam    Physical Exam    Large joint arthrocentesis  Date/Time: 7/30/2018 9:51 AM  Consent given by: patient  Site marked: site marked  Supporting Documentation  Indications: pain   Procedure Details  Location: knee -   Needle size: 22 G  Ultrasound guidance: no  Approach: anterolateral  Medications administered: 20 mg Sodium Hyaluronate 20 MG/2ML    Patient tolerance: patient tolerated the procedure well with no immediate complications  Dressing:  Sterile dressing applied

## 2018-08-06 ENCOUNTER — OFFICE VISIT (OUTPATIENT)
Dept: OBGYN CLINIC | Facility: CLINIC | Age: 78
End: 2018-08-06
Payer: MEDICARE

## 2018-08-06 VITALS
SYSTOLIC BLOOD PRESSURE: 155 MMHG | HEIGHT: 73 IN | WEIGHT: 315 LBS | HEART RATE: 70 BPM | BODY MASS INDEX: 41.75 KG/M2 | DIASTOLIC BLOOD PRESSURE: 71 MMHG

## 2018-08-06 DIAGNOSIS — M17.0 BILATERAL PRIMARY OSTEOARTHRITIS OF KNEE: Primary | ICD-10-CM

## 2018-08-06 PROCEDURE — 20610 DRAIN/INJ JOINT/BURSA W/O US: CPT | Performed by: PHYSICIAN ASSISTANT

## 2018-08-06 RX ORDER — HYALURONATE SODIUM 10 MG/ML
20 SYRINGE (ML) INTRAARTICULAR
Status: COMPLETED | OUTPATIENT
Start: 2018-08-06 | End: 2018-08-06

## 2018-08-06 RX ADMIN — Medication 20 MG: at 11:32

## 2018-08-06 NOTE — PROGRESS NOTES
Assessment/Plan:  1  Bilateral primary osteoarthritis of knee         Follow-up prn  Subjective:   Edilma Jackson is a 66 y o  male who presents today for euflexxa #3 left knee  Review of Systems      Past Medical History:   Diagnosis Date    Arthritis     Chronic kidney disease     stage 3    Coronary artery disease     2 stents    Fluid retention     Gout     Heart failure ([address] )     pacemaker    Hypertension     Pacemaker     Pulmonary emphysema (HCC)     Shortness of breath     exertion    Sleep apnea     c pap       Past Surgical History:   Procedure Laterality Date    ANGIOPLASTY      x2 2 stents and then replaced    CARDIAC SURGERY      pacemaker    CHOLECYSTECTOMY      EPIDURAL BLOCK INJECTION N/A 5/26/2016    Procedure: BLOCK / INJECTION EPIDURAL STEROID LUMBAR  L4-5;  Surgeon: Joss Harrison MD;  Location: [place] [address] MAIN OR;  Service:     EYE SURGERY      cataract left    KNEE ARTHROSCOPY W/ MENISCAL REPAIR Left     LUMBAR EPIDURAL INJECTION N/A 3/17/2016    Procedure: BLOCK / INJECTION LUMBAR  L4-5  (C-ARM); Surgeon: Joss Harrison MD;  Location: [facility] MAIN OR;  Service:        Family History   Problem Relation Age of Onset    No Known Problems Mother     Heart disease Father        Social History     Occupational History    RETIRED      Social History Main Topics    Smoking status: Former Smoker     Packs/day: 1 00     Years: 50 00     Quit date: 2015    Smokeless tobacco: Never Used    Alcohol use No    Drug use: No    Sexual activity: Not on file         Current Outpatient Prescriptions:     albuterol (VENTOLIN HFA) 90 mcg/act inhaler, Inhale 2 puffs every 6 (six) hours as needed for wheezing, Disp: 1 Inhaler, Rfl: 6    allopurinol (ZYLOPRIM) 100 mg tablet, Take 1 tablet (100 mg total) by mouth daily, Disp: 90 tablet, Rfl: 3    ascorbic acid (VITAMIN C) 500 mg tablet, Take 500 mg by mouth daily  , Disp: , Rfl:     B Complex Vitamins (B COMPLEX 1 PO), Take 1 tablet by mouth daily  , Disp: , Rfl:     Biotin (BIOTIN 5000) 5 MG CAPS, Take 1,000 mcg by mouth daily  , Disp: , Rfl:     calcium carbonate-vitamin D (OSCAL-D) 500 mg-200 units per tablet, Take 2 tablets by mouth daily at bedtime  , Disp: , Rfl:     clopidogrel (PLAVIX) 75 mg tablet, TAKE 1 TABLET DAILY, Disp: 90 tablet, Rfl: 3    collagenase (SANTYL) ointment, Apply 1 application topically daily  , Disp: , Rfl:     Cranberry 1000 MG CAPS, Take 1 tablet by mouth 2 (two) times a day , Disp: , Rfl:     felodipine (PLENDIL) 5 mg 24 hr tablet, TAKE 1 TABLET DAILY, Disp: 90 tablet, Rfl: 3    finasteride (PROSCAR) 5 mg tablet, Take 1 tablet (5 mg total) by mouth daily, Disp: 90 tablet, Rfl: 3    Flaxseed, Linseed, (EQL FLAX SEED OIL) 1000 MG CAPS, Take by mouth daily  , Disp: , Rfl:     furosemide (LASIX) 40 mg tablet, Take 40 mg by mouth daily as needed  , Disp: , Rfl:     glucosamine-chondroitin 500-400 MG tablet, Take 1 tablet by mouth 2 (two) times a day , Disp: , Rfl:     lactase (LACTAID) 3,000 units tablet, Take 3,000 Units by mouth as needed  , Disp: , Rfl:     Omega-3 Fatty Acids (FISH OIL) 1,000 mg, Take 1,000 mg by mouth 2 (two) times a day , Disp: , Rfl:     PARoxetine (PAXIL) 20 mg tablet, TAKE 1/2 TABLET BY MOUTH DAILY, Disp: 30 tablet, Rfl: 0    PARoxetine (PAXIL) 20 mg tablet, TAKE 1/2 TABLET BY MOUTH DAILY, Disp: 30 tablet, Rfl: 0    psyllium (METAMUCIL) 58 6 % powder, Take 1 packet by mouth daily Indications: 1 tsp , Disp: , Rfl:     rivaroxaban (XARELTO) tablet, Take 20 mg by mouth , Disp: , Rfl:     simvastatin (ZOCOR) 20 mg tablet, Take 1 tablet (20 mg total) by mouth daily at bedtime, Disp: 90 tablet, Rfl: 3    UNABLE TO FIND, 100 Units daily   Med Name: vit d3, Disp: , Rfl:     gabapentin (NEURONTIN) 100 mg capsule, Take 1 capsule (100 mg total) by mouth 3 (three) times a day for 30 days, Disp: 90 capsule, Rfl: 0    No Known Allergies    Objective:  Vitals:    08/06/18 1118   BP: 155/71 Pulse: 70       Ortho Exam    Physical Exam    Large joint arthrocentesis  Date/Time: 8/6/2018 11:32 AM  Consent given by: patient  Site marked: site marked  Supporting Documentation  Indications: pain   Procedure Details  Location: knee - L knee  Needle size: 22 G  Ultrasound guidance: no  Approach: anterolateral  Medications administered: 20 mg Sodium Hyaluronate 20 MG/2ML    Patient tolerance: patient tolerated the procedure well with no immediate complications  Dressing:  Sterile dressing applied

## 2018-09-14 LAB
APPEARANCE UR: CLEAR
BACTERIA URNS QL MICRO: ABNORMAL
BILIRUB UR QL STRIP: NEGATIVE
BUN SERPL-MCNC: 17 MG/DL (ref 8–27)
BUN/CREAT SERPL: 13 (ref 10–24)
CALCIUM SERPL-MCNC: 9.7 MG/DL (ref 8.6–10.2)
CHLORIDE SERPL-SCNC: 102 MMOL/L (ref 96–106)
CO2 SERPL-SCNC: 21 MMOL/L (ref 20–29)
COLOR UR: YELLOW
CREAT SERPL-MCNC: 1.32 MG/DL (ref 0.76–1.27)
CREAT UR-MCNC: 224.2 MG/DL
EPI CELLS #/AREA URNS HPF: ABNORMAL /HPF
ERYTHROCYTE [DISTWIDTH] IN BLOOD BY AUTOMATED COUNT: 14.2 % (ref 12.3–15.4)
GLUCOSE SERPL-MCNC: 147 MG/DL (ref 65–99)
GLUCOSE UR QL: NEGATIVE
HCT VFR BLD AUTO: 47.3 % (ref 37.5–51)
HGB BLD-MCNC: 16.1 G/DL (ref 13–17.7)
HGB UR QL STRIP: NEGATIVE
KETONES UR QL STRIP: NEGATIVE
LEUKOCYTE ESTERASE UR QL STRIP: ABNORMAL
MAGNESIUM SERPL-MCNC: 2.1 MG/DL (ref 1.6–2.3)
MCH RBC QN AUTO: 31.4 PG (ref 26.6–33)
MCHC RBC AUTO-ENTMCNC: 34 G/DL (ref 31.5–35.7)
MCV RBC AUTO: 92 FL (ref 79–97)
MICRO URNS: ABNORMAL
MUCOUS THREADS URNS QL MICRO: PRESENT
NITRITE UR QL STRIP: NEGATIVE
PH UR STRIP: 5.5 [PH] (ref 5–7.5)
PHOSPHATE SERPL-MCNC: 3.4 MG/DL (ref 2.5–4.5)
PLATELET # BLD AUTO: 174 X10E3/UL (ref 150–379)
POTASSIUM SERPL-SCNC: 4.7 MMOL/L (ref 3.5–5.2)
PROT UR QL STRIP: ABNORMAL
PROT UR-MCNC: 48 MG/DL
PROT/CREAT UR: 214 MG/G CREAT (ref 0–200)
RBC # BLD AUTO: 5.12 X10E6/UL (ref 4.14–5.8)
RBC #/AREA URNS HPF: ABNORMAL /HPF
SL AMB EGFR AFRICAN AMERICAN: 59 ML/MIN/1.73
SL AMB EGFR NON AFRICAN AMERICAN: 51 ML/MIN/1.73
SODIUM SERPL-SCNC: 138 MMOL/L (ref 134–144)
SP GR UR: 1.02 (ref 1–1.03)
UROBILINOGEN UR STRIP-ACNC: 0.2 EU/DL (ref 0.2–1)
WBC # BLD AUTO: 9.8 X10E3/UL (ref 3.4–10.8)
WBC #/AREA URNS HPF: ABNORMAL /HPF

## 2018-09-16 ENCOUNTER — TELEPHONE (OUTPATIENT)
Dept: OTHER | Facility: HOSPITAL | Age: 78
End: 2018-09-16

## 2018-09-17 NOTE — TELEPHONE ENCOUNTER
Spoke with the patient and the wife states she is aware of his lab test results and he currently has no UTI symptoms

## 2018-09-17 NOTE — TELEPHONE ENCOUNTER
Cr remains stable  UA shows signs of infection  Does he have any urinary symptoms including dysuria, frequency or urgency? Any fever/chills, lower abdominal/flank pain? If no symptoms, will not treat

## 2018-09-20 ENCOUNTER — OFFICE VISIT (OUTPATIENT)
Dept: PODIATRY | Facility: CLINIC | Age: 78
End: 2018-09-20
Payer: MEDICARE

## 2018-09-20 VITALS — BODY MASS INDEX: 41.75 KG/M2 | HEIGHT: 73 IN | WEIGHT: 315 LBS

## 2018-09-20 DIAGNOSIS — M21.969 ACQUIRED DEFORMITY OF FOOT, UNSPECIFIED LATERALITY: ICD-10-CM

## 2018-09-20 DIAGNOSIS — B35.9 DERMATOPHYTOSIS: ICD-10-CM

## 2018-09-20 DIAGNOSIS — M79.671 PAIN IN BOTH FEET: ICD-10-CM

## 2018-09-20 DIAGNOSIS — M54.16 RADICULOPATHY, LUMBAR REGION: ICD-10-CM

## 2018-09-20 DIAGNOSIS — I70.209 PERIPHERAL ARTERIOSCLEROSIS (HCC): Primary | ICD-10-CM

## 2018-09-20 DIAGNOSIS — M79.672 PAIN IN BOTH FEET: ICD-10-CM

## 2018-09-20 PROCEDURE — 99213 OFFICE O/P EST LOW 20 MIN: CPT | Performed by: PODIATRIST

## 2018-09-20 RX ORDER — GABAPENTIN 300 MG/1
300 CAPSULE ORAL 2 TIMES DAILY
Qty: 60 CAPSULE | Refills: 2 | Status: SHIPPED | OUTPATIENT
Start: 2018-09-20 | End: 2018-12-17 | Stop reason: SDUPTHER

## 2018-09-20 RX ORDER — CLOTRIMAZOLE AND BETAMETHASONE DIPROPIONATE 10; .64 MG/G; MG/G
CREAM TOPICAL 2 TIMES DAILY
Qty: 30 G | Refills: 5 | Status: SHIPPED | OUTPATIENT
Start: 2018-09-20 | End: 2018-09-20

## 2018-09-20 NOTE — PROGRESS NOTES
Procedures   Foot Exam     Assessment/Plan:     No problem-specific Assessment & Plan notes found for this encounter      Acquired deformity of foot, unspecified laterality     Pain in joints of both feet     Dermatophytosis     Peripheral arteriosclerosis (HCC)     Pain in both feet     Onychomycosis        Subjective:   Patient has complaint of pain upon ambulation  Patient has back pain  He has leg pain  Patient has known radiculopathy  He is taking low-dose gabapentin      Patient ID: Giovanny Salazar is a 68 y  o  male      HPI     The following portions of the patient's history were reviewed and updated as appropriate: allergies, current medications, past family history, past medical history, past social history, past surgical history and problem listAssessment  1  Acquired ankle/foot deformity (736 70) (M21 969)   2  Onychomycosis (110 1) (B35 1)   3  Difficulty in walking (719 7) (R26 2)        Discussion/Summary  The patient, patient's family was counseled regarding instructions for management,-- patient and family education  Patient is able to Self-Care  The treatment plan was reviewed with the patient/guardian  The patient/guardian understands and agrees with the treatment plan      Chief Complaint  Patient has pain in both feet  Ernestine Luna He has pain in his toes with ambulation  He has dry scaly skin      History of Present Illness  HPI: Agent has continued bilateral lower extremity pain  He is taking Lyrica  He takes Percocet when necessary  He has back pain  His legs feel heavy and weak       Review of Systems   Constitutional: no fever-- and-- no chills    ENT: no nasal discharge   Cardiovascular: no palpitations   Gastrointestinal: no constipation   Genitourinary: no urinary hesitancy   Integumentary: no rashes   Neurological: no headache-- and-- no dizziness   Other Symptoms: anxiety insomnia       Active Problems     1  Abrasion (919 0) (T14 8XXA)   2  Acquired ankle/foot deformity (736 70) (M21 969)   3  Acute gout (274 01) (M10 9)   4  Acute gouty arthritis (274 01) (M10 9)   5  Allergic rhinitis (477 9) (J30 9)   6  Aneurysm of abdominal aorta (441 4) (I71 4)   7  Angina pectoris (413 9) (I20 9)   8  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) (F06 4)   9  Arteriosclerosis of coronary artery (414 00) (I25 10)   10  Arthralgia of ankle or foot (719 47) (M25 579)   11  Atherosclerosis of arteries of extremities (440 20) (I70 209)   12  Atrial fibrillation, unspecified type (427 31) (I48 91)   13  Atrial flutter (427 32) (I48 92)   14  Benign hypertension with CKD (chronic kidney disease) stage III (923 65,148  3)  (I12 9,N18 3)   15  Benign prostatic hypertrophy (600 00) (N40 0)   16  Bilateral edema of lower extremity (782 3) (R60 0)   17  Bilateral lumbar radiculopathy (724 4) (M54 16)   18  Blurred vision (368 8) (H53 8)   19  Body mass index (BMI) of 45 0 to 49 9 in adult (V85 42) (Z68 42)   20  Callus (700) (L84)   21  Carpal tunnel syndrome, unspecified laterality (354 0) (G56 00)   22  Cellulitis (682 9) (L03 90)   23  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)   24  Chronic low back pain (724 2,338 29) (M54 5,G89 29)   25  Chronic obstructive pulmonary disease (496) (J44 9)   26  Chronic pain syndrome (338 4) (G89 4)   27  CKD (chronic kidney disease), stage III (585 3) (N18 3)   28  Colon cancer screening (V76 51) (Z12 11)   29  Colon, diverticulosis (562 10) (K57 30)   30  Congestive heart failure (428 0) (I50 9)   31  Coronary artery disease (414 00) (I25 10)   32  Deep venous insufficiency (459 81) (I87 2)   33  Degenerative disc disease, lumbar (722 52) (M51 36)   34  Difficulty in walking (719 7) (R26 2)   35  Encounter for special screening examination for genitourinary disorder (V81 6) (Z13 89)   36  Fecal soiling (787 62) (R15 1)   37  Fibromyalgia (729 1) (M79 7)   38  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 3)   39  Gastrointestinal symptoms (787 99) (R19 8)   40  Gout (274 9) (M10 9)   41  Hyperkalemia (276 7) (E87 5)   42  Hyperlipidemia (272 4) (E78 5)   43  Insomnia, persistent (307 42) (G47 00)   44  Lumbar canal stenosis (724 02) (M48 061)   45  Lumbar neuritis (724 4) (M54 16)   46  Memory loss (780 93) (R41 3)   47  Microscopic hematuria (599 72) (R31 29)   48  Moderate or severe vision impairment, one eye (369 60) (H54 40)   49  Morbid or severe obesity due to excess calories (278 01) (E66 01)   50  Morbid or severe obesity due to excess calories (278 01) (E66 01)   51  Needs flu shot (V04 81) (Z23)   52  Nicotine dependence (305 1) (F17 200)   53  Olecranon bursitis (726 33) (M70 20)   54  Onychogryphosis (703 8)   55  Onychomycosis (110 1) (B35 1)   56  Osteoarthritis, knee/lower leg (715 96) (M17 9)   57  Pacemaker Permanent Placement Dual-Chamber   58  Pain in joints of both feet (719 47) (M79 671,M79 672)   59  Paronychia of toe, unspecified laterality (681 11) (L03 039)   60  Pes planus, congenital (754 61) (Q66 50)   61  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)   62  Presence of cardiac pacemaker (V45 01) (Z95 0)   63  Proteinuria (791 0) (R80 9)   64  Radiculopathy (729 2) (M54 10)   65  Screening for genitourinary condition (V81 6) (Z13 89)   66  Skin tear of right lower leg without complication, initial encounter (891 0) (S81 811A)   67  Sleep apnea (780 57) (G47 30)   68  Sleep apnea (780 57) (G47 30)   69  Tenderness in limb (729 5) (M79 609)   70  Tendonitis (726 90) (M77 9)   71  Tinea corporis (110 5) (B35 4)   72  Urinary incontinence (788 30) (R32)   73  Venous insufficiency (chronic) (peripheral) (459 81) (I87 2)     Past Medical History   · History of Atrial flutter (427 32) (I48 92)   · History of Cellulitis of foot (682 7) (L03 119)   · History of Colon cancer screening (V76 51) (Z12 11)   · History of Difficulty in walking (719 7) (R26 2)   · History of Difficulty in walking (719 7) (R26 2)   · General medical examination (V70 9) (Z00 00)   · History of backache (V13 59) (Z87 39)   · History of radiculopathy (V12 49) (Z86 69)   · 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)     The active problems and past medical history were reviewed and updated today       Surgical History   · History of Cath Stent Placement   · History of Cholecystectomy   · History of Knee Surgery   · Pacemaker Permanent Placement Dual-Chamber   · History of Permanent Pacemaker Implantation Date     The surgical history was reviewed and updated today        Family History  Mother    · Family history of Cancer   · Family history of Family Health Status Of Mother -    · Family history of Heart Disease (V17 49)   · Family history of Hypertension (V17 49)   · Family history of Mother  At Age 80  Father    · Family history of Father  At Age 47   · Family history of Heart Disease (V17 49)  Brother    · Family history of Heart Disease (V17 49)     The family history was reviewed and updated today        Social History      · Denied: Alcohol Use (History)   · Being A Social Drinker   · Cigarette smoker (305 1) (F17 210)   · Current Every Day Smoker (305 1)   · Current Smoker (V15 82)   · Daily Tea Consumption (10  Cups/Day)   · Denied: Drug Use (305 90)   · Marital History - Currently    · Recently Stopping Smoking (V15 82)  The social history was reviewed and updated today  The social history was reviewed and is unchanged       Current Meds   1  Allopurinol 100 MG Oral Tablet; TAKE ONE TABLET BY MOUTH EVERY DAY FOR GOUT; Therapy: 57WEX7150 to (Evaluate:58Wkj1136)  Requested for: 2017; Last Rx:2017 Ordered   2  Amoxicillin 500 MG Oral Tablet; 4 po 1 hour prior to procedure  Requested for: 33KCB9216; Last Rx:2017 Ordered   3  B Complex Oral Capsule; TAKE 1 CAPSULE DAILY; Therapy: (Recorded:2014) to Recorded   4  Betamethasone Dipropionate 0 05 % External Cream; APPLY SPARINGLY TO AFFECTED AREA(S) TWICE DAILY;  Therapy: 25Szi0223 to (Last Rx:2017)  Requested for: 17Vgl1941 Ordered   5  Biotin 1000 MCG Oral Tablet; Take 1 tablet daily; Therapy: (Recorded:08Apr2014) to Recorded   6  Calcium-Carb 600 600 MG Oral Tablet; TAKE 2 TABLETS DAILY; Therapy: 08ODA8712 to  Requested for: 21Aug2017 Recorded   7  Centrum Silver Ultra Mens Oral Tablet; 1 tab daily; Therapy: 88WXL6633 to  Requested for: 11CQZ8993 Recorded   8  Cinnamon 500 MG Oral Capsule; Take 1 capsule twice daily; Therapy: (Recorded:08Apr2014) to Recorded   9  ClonazePAM 1 MG Oral Tablet Disintegrating; PLACE 1 TABLET ON TONGUE AND ALLOW TO DISSOLVE TWICE DAILY AS NEEDED; Therapy: 19HEZ8880 to (Evaluate:16Sep2016); Last Rx:18Jul2016 Ordered   10  Clopidogrel Bisulfate 75 MG Oral Tablet; take one tablet by mouth every day;  Therapy: 98JTF9482 to (Evaluate:37Rpp3858)  Requested for: 11Aug2017; Last  Rx:11Aug2017 Ordered   11  Clotrimazole 1 % External Cream; APPLY 2-3 TIMES DAILY TO AFFECTED AREA(S);  Therapy: 21Aug2017 to (Last Rx:21Aug2017)  Requested for: 21Aug2017 Ordered   12  Colcrys 0 6 MG Oral Tablet; TAKE 1 TABLET DAILY AS DIRECTED;  Therapy: 10CPQ5974 to (Evaluate:21Jun2016); Last Rx:24Mar2016 Ordered   13  Cranberry Fruit Concentrate 77246 MG Oral Capsule; Take 1 capsule twice daily  Recorded   14  [employer] Fish Oil CAPS; TAKE 1 CAPSULE Daily Recorded   15  Donepezil HCl - 5 MG Oral Tablet; 1 every day;  Therapy: 59Tdl6931 to (Last Rx:21Aug2017)  Requested for: 83Opo1330 Ordered   16  Felodipine ER 5 MG Oral Tablet Extended Release 24 Hour; take 1 tablet by mouth once Ligia Murphy: 76BTD1731 to (EXWHVWWB:89ESI4637)  Requested for: 16Ztz8167; Last  Rx:01Gsq9691 Ordered   17  Finasteride 5 MG Oral Tablet; 1 DAILY;  Therapy: 14UJP6298 to (Last Rx:24Jul2017)  Requested for: 58Hgh5756 Ordered   18  Flax Seed Oil 1000 MG Oral Capsule; 2 tabs daily; Emily Ag: 89XFH9435 to  Requested for: 36LYD0428 Recorded   19  Ginkgo Biloba 120 MG Oral Capsule;  Take 1 capsule twice daily;  Therapy: (Recorded:21Apr2014) to Recorded   20  Glucosamine 1500 Complex Oral Capsule; TAKE 2 CAPSULE Twice daily;  Therapy: (Recorded:08Apr2014) to Recorded   21  Lactaid U Trati 1724; CHEW AND SWALLOW 1 TO 3 TABLETS WITH FIRST BITE  OF MILK FOOD;  Therapy: (Recorded:21Apr2014) to Recorded   22  Magnesium 500 MG Oral Capsule; take 1 capsule daily;  Therapy: (Recorded:21Apr2014) to Recorded   23  Nitrostat 0 4 MG Sublingual Tablet Sublingual; DISSOLVE 1 TABLET UNDER THE  TONGUE AS NEEDED FOR CHEST PAIN;  Therapy: 46SLS1884 to (Evaluate:29Oct2015)  Requested for: 40SJD4897; Last  Rx:80Fhk2161 Ordered   24  PARoxetine HCl - 20 MG Oral Tablet; 1 EVERY MORNING;  Therapy: 55FOI3232 to (Last Rx:17Mle3104)  Requested for: 31Mzu6013 Ordered   25  ProAir  (90 Base) MCG/ACT Inhalation Aerosol Solution; 2 puffs Q4 hours prn  SOB/wheeze;  Therapy: 65NUI8953 to (Last Rx:19Nov2013)  Requested for: 06AEV8672 Ordered   26  Selenium  MCG Oral Tablet Extended Release; 1 tab daily;  Therapy: 04MOI3170 to  Requested for: 41XGF6211 Recorded   27  Silver Sulfadiazine 1 % External Cream; APPLY TO AFFECTED AREA TWICE DAILY AS Jaiden [place]: 92UMY2295 to (Last Rx:06Apr2016)  Requested for: 70EMK6267 Ordered   28  Simvastatin 20 MG Oral Tablet; 1 TAB DAILY;  Therapy: 36GGB4923 to (Last Rx:01Hjb8089)  Requested for: 80Hka2817 Ordered   29  TraMADol HCl - 50 MG Oral Tablet; TAKE 1 TABLET 4 TIMES DAILY AS NEEDED FOR  PAIN; Last Rx:95Zzc6163 Ordered   30  Vitamin C 500 MG Oral Tablet; TAKE 1 TABLET DAILY;  Therapy: (Recorded:21Apr2014) to Recorded   31  Vitamin D 1000 UNIT Oral Tablet; TAKE 1 TABLET DAILY;  Therapy: (Recorded:08Apr2014) to Recorded   32  Xarelto 20 MG Oral Tablet;  One a day;  Therapy: 77KPY8612 to (Last Rx:80Vqv6409) Ordered   33  Zinc 50 MG Oral Tablet; TAKE 1 TABLET DAILY;  Therapy: (Recorded:08Apr2014) to Recorded     The medication list was reviewed and updated today        Allergies  1  No Known Drug Allergies     Vitals        Heart Rate 86   Respiration 20   Systolic 869   Diastolic 84   Height 6 ft    Weight 360 lb    BMI Calculated 48 83   BSA Calculated 2 73      Physical Exam  Left Foot: Appearance: Normal except as noted: excessive pronation-- and-- pes planus  Second toe deformities include hammer toe  Third toe deformities include hammer toe  Forth toe deformities include hammer toe  Fifth toe deformities include hammer toe  Tenderness: None except the great toe,-- distal first metatarsal,-- distal fifth metatarsal-- and-- insertion of the plantar fascia  Positive Brian sign third left intermetatarsal space  ROM: Full  Motor: Normal   Right Foot: Appearance: Normal except as noted: excessive pronation-- and-- pes planus  Second toe deformities include hammer toe  Third toe deformities include hammer toe  Forth toe deformities include hammer toe  Fifth toe deformities include hammer toe  Evaluation of the great toe nail demonstrates an ingrown nail  Tenderness: None except the insertion of the plantar fascia  Left Ankle: Appearance: Normal except erythema,-- swelling-- and-- swelling laterally  Tenderness: None except the tibiotalar joint  Right Ankle: ROM: Full  Neurological Exam: performed  Deep tendon reflexes: + tinel of right tibial nerve  Vascular Exam: performed Dorsalis pedis pulses were present bilaterally  Posterior tibial pulses were present bilaterally  Elevation Pallor: absent bilaterally  Capillary refill time was less than 1 second bilaterally  Edema: moderate bilaterally-- and-- 4/7 pitting  neg  buddy  Toenails: All of the toenails were elongated,-- hypertrophied,-- discolored,-- ingrown,-- shown to have subungual debris,-- tender-- and-- Mycotic with onychauxis  Hyperkeratosis: present on both first toes,-- present on both first sub metatarsals-- and-- present on both fifth sub metatarsals  Shoe Gear Evaluation: performed ()   Recommendation(s): extra depth diabetic shoes       Procedure  All abnormal skin a mycotic nails debrided without complication her pain  Gentian violet applied to webspace   We will add topical antifungal   Will increase patient's gabapentin dosing

## 2018-10-01 ENCOUNTER — OFFICE VISIT (OUTPATIENT)
Dept: FAMILY MEDICINE CLINIC | Facility: CLINIC | Age: 78
End: 2018-10-01
Payer: MEDICARE

## 2018-10-01 VITALS
DIASTOLIC BLOOD PRESSURE: 76 MMHG | SYSTOLIC BLOOD PRESSURE: 124 MMHG | HEART RATE: 88 BPM | WEIGHT: 315 LBS | HEIGHT: 73 IN | RESPIRATION RATE: 24 BRPM | BODY MASS INDEX: 41.75 KG/M2 | TEMPERATURE: 98 F

## 2018-10-01 DIAGNOSIS — N18.30 CKD (CHRONIC KIDNEY DISEASE), STAGE III (HCC): ICD-10-CM

## 2018-10-01 DIAGNOSIS — R31.29 MICROSCOPIC HEMATURIA: ICD-10-CM

## 2018-10-01 DIAGNOSIS — M48.061 SPINAL STENOSIS AT L4-L5 LEVEL: Primary | ICD-10-CM

## 2018-10-01 DIAGNOSIS — Z23 NEED FOR INFLUENZA VACCINATION: ICD-10-CM

## 2018-10-01 DIAGNOSIS — E66.09 OBESITY DUE TO EXCESS CALORIES WITH SERIOUS COMORBIDITY, UNSPECIFIED CLASSIFICATION: ICD-10-CM

## 2018-10-01 DIAGNOSIS — R35.0 URINARY FREQUENCY: Primary | ICD-10-CM

## 2018-10-01 DIAGNOSIS — R35.0 URINARY FREQUENCY: ICD-10-CM

## 2018-10-01 PROBLEM — I48.91 ATRIAL FIBRILLATION (HCC): Status: ACTIVE | Noted: 2017-08-21

## 2018-10-01 PROCEDURE — 90662 IIV NO PRSV INCREASED AG IM: CPT

## 2018-10-01 PROCEDURE — G0008 ADMIN INFLUENZA VIRUS VAC: HCPCS

## 2018-10-01 PROCEDURE — 99214 OFFICE O/P EST MOD 30 MIN: CPT | Performed by: FAMILY MEDICINE

## 2018-10-01 RX ORDER — NITROFURANTOIN 25; 75 MG/1; MG/1
100 CAPSULE ORAL 2 TIMES DAILY
Qty: 14 CAPSULE | Refills: 0 | Status: SHIPPED | OUTPATIENT
Start: 2018-10-01 | End: 2018-10-11 | Stop reason: ALTCHOICE

## 2018-10-01 NOTE — PATIENT INSTRUCTIONS
Follow up Urology  Recheck urine ? Cystoscopy  Continue medications  Cardiology and Nephrology as scheduled  stay current with colon and prostate screening     Flu vaccine today     F/u Pain eval/podiatry

## 2018-10-01 NOTE — PROGRESS NOTES
Subjective:           Problem List Items Addressed This Visit     Spinal stenosis at L4-L5 level - Primary worsening PT ortho f/u    CKD (chronic kidney disease), stage III ([address] ) f/u nephrology  Cont medications    Microscopic hematuria Urology    Urinary frequency Urology given symtoms      Other Visit Diagnoses     Need for influenza vaccination            Weight loss Calorie restriction  Orders Placed This Encounter   Procedures    influenza vaccine, 4753-7718, high-dose, PF 0 5 mL, for patients 65 yr+ (FLUZONE HIGH-DOSE)       Patient Instructions   Follow up Urology  Recheck urine ? Cystoscopy  Continue medications  Cardiology and Nephrology as scheduled  stay current with colon and prostate screening     Flu vaccine today     F/u Pain eval        Lelon Hands      HPI Damien Lu is in for evaluation urinary frequency worsening had gross hematuria no flank pain  He has a history of BPH, CAD  Stents x3 gout COPD sleep apnea on CPAP CHF on diuretic therapy has pacemaker  Damien Lu is on Proscar for BPH  Was on VESIcare in the past     Vitals:    10/01/18 1136   BP: 124/76   Pulse: 88   Resp: (!) 24   Temp: 98 °F (36 7 °C)       No Known Allergies    Current Outpatient Prescriptions on File Prior to Visit   Medication Sig Dispense Refill    albuterol (VENTOLIN HFA) 90 mcg/act inhaler Inhale 2 puffs every 6 (six) hours as needed for wheezing 1 Inhaler 6    allopurinol (ZYLOPRIM) 100 mg tablet Take 1 tablet (100 mg total) by mouth daily 90 tablet 3    ascorbic acid (VITAMIN C) 500 mg tablet Take 500 mg by mouth daily   B Complex Vitamins (B COMPLEX 1 PO) Take 1 tablet by mouth daily   Biotin (BIOTIN 5000) 5 MG CAPS Take 1,000 mcg by mouth daily   calcium carbonate-vitamin D (OSCAL-D) 500 mg-200 units per tablet Take 2 tablets by mouth daily at bedtime        ciclopirox (LOPROX) 0 77 % cream Apply topically 2 (two) times a day for 20 days 45 g 2    clopidogrel (PLAVIX) 75 mg tablet TAKE 1 TABLET DAILY 90 tablet 3    Cranberry 1000 MG CAPS Take 1 tablet by mouth 2 (two) times a day   felodipine (PLENDIL) 5 mg 24 hr tablet TAKE 1 TABLET DAILY 90 tablet 3    finasteride (PROSCAR) 5 mg tablet Take 1 tablet (5 mg total) by mouth daily 90 tablet 3    Flaxseed, Linseed, (EQL FLAX SEED OIL) 1000 MG CAPS Take by mouth daily   fluocinonide (LIDEX) 0 05 % cream Apply topically 2 (two) times a day for 30 days 30 g 2    furosemide (LASIX) 40 mg tablet Take 40 mg by mouth daily as needed        gabapentin (NEURONTIN) 300 mg capsule Take 1 capsule (300 mg total) by mouth 2 (two) times a day for 30 days 60 capsule 2    glucosamine-chondroitin 500-400 MG tablet Take 1 tablet by mouth 2 (two) times a day   Omega-3 Fatty Acids (FISH OIL) 1,000 mg Take 1,000 mg by mouth 2 (two) times a day   PARoxetine (PAXIL) 20 mg tablet TAKE 1/2 TABLET BY MOUTH DAILY 30 tablet 0    rivaroxaban (XARELTO) tablet Take 20 mg by mouth   simvastatin (ZOCOR) 20 mg tablet Take 1 tablet (20 mg total) by mouth daily at bedtime 90 tablet 3    UNABLE TO FIND 1,000 Units daily Med Name: vit d3        collagenase (SANTYL) ointment Apply 1 application topically daily   gabapentin (NEURONTIN) 100 mg capsule Take 1 capsule (100 mg total) by mouth 3 (three) times a day for 30 days 90 capsule 0    lactase (LACTAID) 3,000 units tablet Take 3,000 Units by mouth as needed   PARoxetine (PAXIL) 20 mg tablet TAKE 1/2 TABLET BY MOUTH DAILY (Patient not taking: Reported on 10/1/2018) 30 tablet 0    psyllium (METAMUCIL) 58 6 % powder Take 1 packet by mouth daily Indications: 1 tsp  No current facility-administered medications on file prior to visit          Past Medical History:   Diagnosis Date    Arthritis     Chronic kidney disease     stage 3    Coronary artery disease     2 stents    Fluid retention     Gout     Heart failure ([facility] [address] )     pacemaker    Hypertension     Pacemaker     Pulmonary emphysema (HCC)     Shortness of breath     exertion    Sleep apnea     c pap       Past Surgical History:   Procedure Laterality Date    ANGIOPLASTY      x2 2 stents and then replaced    CARDIAC SURGERY      pacemaker    CHOLECYSTECTOMY      EPIDURAL BLOCK INJECTION N/A 5/26/2016    Procedure: BLOCK / INJECTION EPIDURAL STEROID LUMBAR  L4-5;  Surgeon: Courtney Jackson MD;  Location: [place] [address] MAIN OR;  Service:     EYE SURGERY      cataract left    KNEE ARTHROSCOPY W/ MENISCAL REPAIR Left     LUMBAR EPIDURAL INJECTION N/A 3/17/2016    Procedure: BLOCK / INJECTION LUMBAR  L4-5  (C-ARM); Surgeon: Courtney Jackson MD;  Location: [facility] MAIN OR;  Service:           reports that he quit smoking about 3 years ago  He has a 50 00 pack-year smoking history  He has never used smokeless tobacco  He reports that he does not drink alcohol or use drugs  reports that he quit smoking about 3 years ago  He has a 50 00 pack-year smoking history  He has never used smokeless tobacco     The following portions of the patient's history were reviewed and updated as appropriate: allergies, current medications, past family history, past medical history, past social history, past surgical history and problem list     Review of Systems   Constitutional: Positive for appetite change, fatigue and unexpected weight change  Negative for fever  HENT: Negative for ear discharge, mouth sores, nosebleeds and sneezing  Respiratory: Negative for cough and choking  PRETTY   Cardiovascular: Positive for leg swelling  Negative for chest pain  Gastrointestinal: Positive for abdominal distention  Negative for blood in stool  Genitourinary: Positive for frequency  Negative for flank pain and hematuria  Musculoskeletal: Positive for arthralgias, back pain, gait problem and joint swelling  Skin: Negative for wound  Neurological: Positive for numbness  Negative for syncope, facial asymmetry and light-headedness  Psychiatric/Behavioral: Positive for dysphoric mood  Negative for self-injury  Physical Exam   Constitutional: He is oriented to person, place, and time  obese   HENT:   Head: Normocephalic  Nose: Nose normal    Eyes: Pupils are equal, round, and reactive to light  EOM are normal    Neck: Normal range of motion  Neck supple  No JVD present  Pulmonary/Chest: Effort normal and breath sounds normal  He has no wheezes  Abdominal: He exhibits distension  He exhibits no mass  Round obese   Musculoskeletal: Normal range of motion  He exhibits edema  Tibial/ankles pitting   Neurological: He is alert and oriented to person, place, and time  Skin: Capillary refill takes 2 to 3 seconds  Rash noted  CV stasis changes   Psychiatric: He has a normal mood and affect   Thought content normal

## 2018-10-11 ENCOUNTER — OFFICE VISIT (OUTPATIENT)
Dept: NEPHROLOGY | Facility: CLINIC | Age: 78
End: 2018-10-11
Payer: MEDICARE

## 2018-10-11 VITALS
HEART RATE: 58 BPM | HEIGHT: 73 IN | WEIGHT: 315 LBS | BODY MASS INDEX: 41.75 KG/M2 | SYSTOLIC BLOOD PRESSURE: 120 MMHG | DIASTOLIC BLOOD PRESSURE: 60 MMHG

## 2018-10-11 DIAGNOSIS — N18.30 CKD (CHRONIC KIDNEY DISEASE), STAGE III (HCC): Primary | ICD-10-CM

## 2018-10-11 DIAGNOSIS — I12.9 BENIGN HYPERTENSION WITH CKD (CHRONIC KIDNEY DISEASE) STAGE III (HCC): ICD-10-CM

## 2018-10-11 DIAGNOSIS — R80.1 PERSISTENT PROTEINURIA: ICD-10-CM

## 2018-10-11 DIAGNOSIS — R31.29 MICROSCOPIC HEMATURIA: ICD-10-CM

## 2018-10-11 DIAGNOSIS — N18.30 BENIGN HYPERTENSION WITH CKD (CHRONIC KIDNEY DISEASE) STAGE III (HCC): ICD-10-CM

## 2018-10-11 DIAGNOSIS — R60.0 BILATERAL EDEMA OF LOWER EXTREMITY: ICD-10-CM

## 2018-10-11 PROCEDURE — 99214 OFFICE O/P EST MOD 30 MIN: CPT | Performed by: INTERNAL MEDICINE

## 2018-10-11 NOTE — PROGRESS NOTES
NEPHROLOGY OUTPATIENT PROGRESS NOTE   April Brothers 66 y o  male MRN: [id number]  DATE: 10/11/2018  Reason for visit:   Chief Complaint   Patient presents with    Follow-up    Chronic Kidney Disease     ASSESSMENT and PLAN:  CKD stage III, baseline serum creatinine 1 2-1 5   - Last serum creatinine 1 3 overall stable and at baseline     - urinalysis in September 2018 showed 1+ leukocyte esterase, 1+ proteinuria, few bacteria, 0 to 2 RBCs, 11 to 30 WBCs  At that time patient did not have any urinary symptoms  Since then he eventually developed mild dysuria, gross hematuria and was given Macrobid by PCP with resolution of symptoms  - CKD likely secondary to long-term hypertension causing hypertensive nephrosclerosis, morbid obesity   - Avoid nephrotoxins or NSAIDs       Proteinuria, last UPC ratio 214 mg   - repeat urinalysi and UPC ratio before next visit     -Could be secondary to underlying hypertension +/- obesity related secondary FSGS component   - Consider ACE inhibitor during next visit      Episode of gross hematuria  -patient had two episodes of gross hematuria when he also had dysuria and was given UTI treatment with Macrobid  Given his significant smoking history in the past, I have also strongly encouraged him to call urologist to notify them regarding gross hematuria as he may need cystoscopy  -he agrees with the plan and he will reach out to urologist asap      Hypertension  - blood pressure controlled in the office today  - Salt restricted diet   - continue felodipine       Leg edema, seem to have slightly worsened along with about 15 pounds weight gain  -he believes that he also gaining solid weight  Has not been taking any Lasix lately   -I encouraged him to take Lasix 40 mg p  o  daily for next 5 to 7 days and if improvement in weight along with leg edema, he can continue eventually as p r n   Basis   -if no further improvement or has continued to worsen leg edema and/or weight gain, advised him to call back         Vitamin-D 25 hydroxy level 36 at goal , PTH 21 in March 2018      Morbid obesity, he has appointment with weight loss program by Bariatric Medicine in December 2018     -seems to be gaining weight  Diagnoses and all orders for this visit:    CKD (chronic kidney disease), stage III ([facility] [address] )  -     Basic metabolic panel; Future  -     CBC; Future  -     Urinalysis with reflex to microscopic; Future  -     Protein / creatinine ratio, urine; Future  -     Uric acid; Future  -     Phosphorus; Future  -     PTH, intact; Future  -     Magnesium; Future    Bilateral edema of lower extremity    Benign hypertension with CKD (chronic kidney disease) stage III (HCC)    Microscopic hematuria    Persistent proteinuria  -     Protein / creatinine ratio, urine; Future          SUBJECTIVE / HPI:  Patient is 79-year-old male with significant medical issues of hypertension for many years, CAD, status post PTCA, status post pacemaker placement, CK D stage III with baseline creatinine in the 1 2-1 5,, gout, comes for regular follow-up of CKD  Last serum creatinine 1 3 overall at baseline  In September 2018 when he had urinalysis done it showed some signs of infection although he did not have any urinary symptoms at that point and was thought to be asymptomatic bacteriuria  Eventually he developed dysuria, two episodes of gross hematuria along with some lower abdominal pain and was given Macrobid by PCP with resolution of his symptoms  Currently at the time of my encounter, he denies any urinary symptoms  He has been gaining weight and has gained about 15 pound since last visit  He has not used Lasix lately  Patient has chronic intermittent exertional dyspnea which is overall stable but some days it is worse  Patient is unable to do exercise due to knee pain issues but tries to walk  He denies any fever or chills  No recent gout attack  No recent NSAID exposure  Still has leg edema      REVIEW OF SYSTEMS:  More than 10 point review of systems were obtained and discussed in length with the patient  Complete review of systems were negative / unremarkable except mentioned above  PHYSICAL EXAM:  Vitals:    10/11/18 1101   BP: 120/60   BP Location: Left arm   Patient Position: Sitting   Cuff Size: Adult   Pulse: 58   Weight: (!) 171 kg (378 lb)   Height: 6' 1" (1 854 m)     Body mass index is 49 87 kg/m²  Physical Exam   Constitutional: He is oriented to person, place, and time  He appears well-nourished  Morbidly obese   HENT:   Head: Normocephalic and atraumatic  Right Ear: External ear normal    Left Ear: External ear normal    Nose: Nose normal    Eyes: Pupils are equal, round, and reactive to light  Conjunctivae and EOM are normal  No scleral icterus  Neck: Neck supple  No JVD present  Cardiovascular: Normal rate and normal heart sounds  Pulmonary/Chest: Effort normal and breath sounds normal  No respiratory distress  He has no wheezes  He has no rales  Abdominal: Soft  Bowel sounds are normal  He exhibits no distension  There is no tenderness  Musculoskeletal: He exhibits edema (Trace leg edema in both legs)  He exhibits no tenderness  Neurological: He is alert and oriented to person, place, and time  Skin: Skin is warm and dry  Psychiatric: He has a normal mood and affect  His behavior is normal    Vitals reviewed        PAST MEDICAL HISTORY:  Past Medical History:   Diagnosis Date    Arthritis     Atrial flutter [address] [address] )     Chronic kidney disease     stage 3    Coronary artery disease     2 stents    Fluid retention     Gout     Heart failure ([facility] [address] )     pacemaker    Hypertension     Pacemaker     Pulmonary emphysema ([facility] [address] )     Radiculopathy     last assessed 1/28/16     Shortness of breath     exertion    Sleep apnea     c pap       PAST SURGICAL HISTORY:  Past Surgical History:   Procedure Laterality Date    ANGIOPLASTY      x2 2 stents and then replaced    CARDIAC PACEMAKER PLACEMENT      pacemaker permanent placement dual chamber / last assessed 4/7/14 / implantation     CARDIAC SURGERY      pacemaker    CHOLECYSTECTOMY      CORONARY ANGIOPLASTY WITH STENT PLACEMENT      EPIDURAL BLOCK INJECTION N/A 5/26/2016    Procedure: BLOCK / INJECTION EPIDURAL STEROID LUMBAR  L4-5;  Surgeon: Fabienne Mendoza MD;  Location[facility] MAIN OR;  Service:     EYE SURGERY      cataract left    KNEE ARTHROSCOPY W/ MENISCAL REPAIR Left     LUMBAR EPIDURAL INJECTION N/A 3/17/2016    Procedure: BLOCK / INJECTION LUMBAR  L4-5  (C-ARM); Surgeon: Fabienne Mendoza MD;  Location: [facility] MAIN OR;  Service:        SOCIAL HISTORY:  History   Alcohol Use No     Comment: social drinker per allscript      History   Drug Use No     History   Smoking Status    Former Smoker    Packs/day: 1 00    Years: 50 00    Quit date: 2015   Smokeless Tobacco    Never Used     Comment: cigarette smoker / current every day smoker / recently stopping smoking per allscript        FAMILY HISTORY:  Family History   Problem Relation Age of Onset    Cancer Mother     Heart disease Mother     Hypertension Mother     Heart disease Father     Heart disease Brother        MEDICATIONS:    Current Outpatient Prescriptions:     albuterol (VENTOLIN HFA) 90 mcg/act inhaler, Inhale 2 puffs every 6 (six) hours as needed for wheezing, Disp: 1 Inhaler, Rfl: 6    allopurinol (ZYLOPRIM) 100 mg tablet, Take 1 tablet (100 mg total) by mouth daily, Disp: 90 tablet, Rfl: 3    ascorbic acid (VITAMIN C) 500 mg tablet, Take 500 mg by mouth daily  , Disp: , Rfl:     B Complex Vitamins (B COMPLEX 1 PO), Take 1 tablet by mouth daily  , Disp: , Rfl:     Biotin (BIOTIN 5000) 5 MG CAPS, Take 1,000 mcg by mouth daily  , Disp: , Rfl:     calcium carbonate-vitamin D (OSCAL-D) 500 mg-200 units per tablet, Take 2 tablets by mouth daily at bedtime  , Disp: , Rfl:     clopidogrel (PLAVIX) 75 mg tablet, TAKE 1 TABLET DAILY, Disp: 90 tablet, Rfl: 3    Cranberry 1000 MG CAPS, Take 1 tablet by mouth 2 (two) times a day , Disp: , Rfl:     felodipine (PLENDIL) 5 mg 24 hr tablet, TAKE 1 TABLET DAILY, Disp: 90 tablet, Rfl: 3    finasteride (PROSCAR) 5 mg tablet, Take 1 tablet (5 mg total) by mouth daily, Disp: 90 tablet, Rfl: 3    Flaxseed, Linseed, (EQL FLAX SEED OIL) 1000 MG CAPS, Take by mouth daily  , Disp: , Rfl:     fluocinonide (LIDEX) 0 05 % cream, Apply topically 2 (two) times a day for 30 days, Disp: 30 g, Rfl: 2    furosemide (LASIX) 40 mg tablet, Take 40 mg by mouth daily as needed  , Disp: , Rfl:     gabapentin (NEURONTIN) 300 mg capsule, Take 1 capsule (300 mg total) by mouth 2 (two) times a day for 30 days, Disp: 60 capsule, Rfl: 2    glucosamine-chondroitin 500-400 MG tablet, Take 1 tablet by mouth 2 (two) times a day , Disp: , Rfl:     lactase (LACTAID) 3,000 units tablet, Take 3,000 Units by mouth as needed  , Disp: , Rfl:     Omega-3 Fatty Acids (FISH OIL) 1,000 mg, Take 1,000 mg by mouth 2 (two) times a day , Disp: , Rfl:     PARoxetine (PAXIL) 20 mg tablet, TAKE 1/2 TABLET BY MOUTH DAILY, Disp: 30 tablet, Rfl: 0    psyllium (METAMUCIL) 58 6 % powder, Take 1 packet by mouth daily Indications: 1 tsp , Disp: , Rfl:     rivaroxaban (XARELTO) tablet, Take 20 mg by mouth , Disp: , Rfl:     simvastatin (ZOCOR) 20 mg tablet, Take 1 tablet (20 mg total) by mouth daily at bedtime, Disp: 90 tablet, Rfl: 3    UNABLE TO FIND, 1,000 Units daily Med Name: vit d3  , Disp: , Rfl:     ciclopirox (LOPROX) 0 77 % cream, Apply topically 2 (two) times a day for 20 days, Disp: 45 g, Rfl: 2    collagenase (SANTYL) ointment, Apply 1 application topically daily  , Disp: , Rfl:     PARoxetine (PAXIL) 20 mg tablet, TAKE 1/2 TABLET BY MOUTH DAILY (Patient not taking: Reported on 10/1/2018), Disp: 30 tablet, Rfl: 0    Lab Results:   Results for orders placed or performed in visit on 81/04/66   Basic metabolic panel   Result Value Ref Range    Glucose 147 (H) 65 - 99 mg/dL    BUN 17 8 - 27 mg/dL    Creatinine 1 32 (H) 0 76 - 1 27 mg/dL    eGFR Non  51 (L) >59 mL/min/1 73    SL AMB EGFR AFRICAN AMERICAN 59 (L) >59 mL/min/1 73    SL AMB BUN/CREATININE RATIO 13 10 - 24    Sodium 138 134 - 144 mmol/L    SL AMB POTASSIUM 4 7 3 5 - 5 2 mmol/L    Chloride 102 96 - 106 mmol/L    CO2 21 20 - 29 mmol/L    CALCIUM 9 7 8 6 - 10 2 mg/dL   Phosphorus   Result Value Ref Range    Phosphorus, Serum 3 4 2 5 - 4 5 mg/dL   Magnesium   Result Value Ref Range    Magnesium, Serum 2 1 1 6 - 2 3 mg/dL   Protein / creatinine ratio, urine   Result Value Ref Range    Creatinine, Urine 224 2 Not Estab  mg/dL    Total Protein, Urine 48 0 Not Estab  mg/dL    Prot/Creat Ratio, Ur 214 (H) 0 - 200 mg/g creat   Urinalysis with reflex to microscopic   Result Value Ref Range    Specific Gravity 1 022 1 005 - 1 030    Ph 5 5 5 0 - 7 5    Color UA Yellow Yellow    Urine Appearance Clear Clear    SL AMB LEUKOCYTE ESTERASE URINE 1+ (A) Negative    Protein 1+ (A) Negative/Trace    Glucose, Urine Negative Negative    SL AMB KETONE, URINE, QUAL  Negative Negative    SL AMB BLOOD, URINE Negative Negative    SL AMB BILIRUBIN, URINE Negative Negative    Urobilinogen Urine 0 2 0 2 - 1 0 EU/dL    SL AMB NITRITES URINE, QUAL  Negative Negative    Microscopic Examination See below:    CBC   Result Value Ref Range    White Blood Cell Count 9 8 3 4 - 10 8 x10E3/uL    Red Blood Cell Count 5 12 4 14 - 5 80 x10E6/uL    Hemoglobin 16 1 13 0 - 17 7 g/dL    HCT 47 3 37 5 - 51 0 %    MCV 92 79 - 97 fL    MCH 31 4 26 6 - 33 0 pg    MCHC 34 0 31 5 - 35 7 g/dL    RDW 14 2 12 3 - 15 4 %    Platelet Count 046 499 - 379 x10E3/uL   Microscopic Examination   Result Value Ref Range    SL AMB WBC, URINE 11-30 (A) 0 - 5 /hpf    RBC, Urine 0-2 0 - 2 /hpf    Epithelial Cells (non renal) 0-10 0 - 10 /hpf    Mucus Threads Present Not Estab      Bacteria, Urine Few None seen/Few

## 2018-10-11 NOTE — LETTER
October 11, 2018     Lindy Canseco MD  [address]  [id number]    Patient: Jason Grant   YOB: 1940   Date of Visit: 10/11/2018       Dear Dr Reich Meth:    Thank you for referring Wilfrido Stinson to me for evaluation  Below are my notes for this consultation  If you have questions, please do not hesitate to call me  I look forward to following your patient along with you  Sincerely,        Papito Rodriguez MD        CC: No Recipients  Papito Rodriguez MD  10/11/2018 12:40 PM  Sign at close encounter  [address] [place] 66 y o  male MRN: [id number]  DATE: 10/11/2018  Reason for visit:   Chief Complaint   Patient presents with    Follow-up    Chronic Kidney Disease     ASSESSMENT and PLAN:  CKD stage III, baseline serum creatinine 1 2-1 5   - Last serum creatinine 1 3 overall stable and at baseline     - urinalysis in September 2018 showed 1+ leukocyte esterase, 1+ proteinuria, few bacteria, 0 to 2 RBCs, 11 to 30 WBCs  At that time patient did not have any urinary symptoms  Since then he eventually developed mild dysuria, gross hematuria and was given Macrobid by PCP with resolution of symptoms  - CKD likely secondary to long-term hypertension causing hypertensive nephrosclerosis, morbid obesity   - Avoid nephrotoxins or NSAIDs       Proteinuria, last UPC ratio 214 mg   - repeat urinalysi and UPC ratio before next visit     -Could be secondary to underlying hypertension +/- obesity related secondary FSGS component   - Consider ACE inhibitor during next visit      Episode of gross hematuria  -patient had two episodes of gross hematuria when he also had dysuria and was given UTI treatment with Macrobid  Given his significant smoking history in the past, I have also strongly encouraged him to call urologist to notify them regarding gross hematuria as he may need cystoscopy    -he agrees with the plan and he will reach out to urologist asap      Hypertension  - blood pressure controlled in the office today  - Salt restricted diet   - continue felodipine       Leg edema, seem to have slightly worsened along with about 15 pounds weight gain  -he believes that he also gaining solid weight  Has not been taking any Lasix lately   -I encouraged him to take Lasix 40 mg p  o  daily for next 5 to 7 days and if improvement in weight along with leg edema, he can continue eventually as p r n  Basis   -if no further improvement or has continued to worsen leg edema and/or weight gain, advised him to call back         Vitamin-D 25 hydroxy level 36 at goal , PTH 21 in March 2018      Morbid obesity, he has appointment with weight loss program by Bariatric Medicine in December 2018     -seems to be gaining weight  Diagnoses and all orders for this visit:    CKD (chronic kidney disease), stage III ([facility] )  -     Basic metabolic panel; Future  -     CBC; Future  -     Urinalysis with reflex to microscopic; Future  -     Protein / creatinine ratio, urine; Future  -     Uric acid; Future  -     Phosphorus; Future  -     PTH, intact; Future  -     Magnesium; Future    Bilateral edema of lower extremity    Benign hypertension with CKD (chronic kidney disease) stage III ([facility])    Microscopic hematuria    Persistent proteinuria  -     Protein / creatinine ratio, urine; Future          SUBJECTIVE / HPI:  Patient is 70-year-old male with significant medical issues of hypertension for many years, CAD, status post PTCA, status post pacemaker placement, CK D stage III with baseline creatinine in the 1 2-1 5,, gout, comes for regular follow-up of CKD  Last serum creatinine 1 3 overall at baseline  In September 2018 when he had urinalysis done it showed some signs of infection although he did not have any urinary symptoms at that point and was thought to be asymptomatic bacteriuria    Eventually he developed dysuria, two episodes of gross hematuria along with some lower abdominal pain and was given Macrobid by PCP with resolution of his symptoms  Currently at the time of my encounter, he denies any urinary symptoms  He has been gaining weight and has gained about 15 pound since last visit  He has not used Lasix lately  Patient has chronic intermittent exertional dyspnea which is overall stable but some days it is worse  Patient is unable to do exercise due to knee pain issues but tries to walk  He denies any fever or chills  No recent gout attack  No recent NSAID exposure  Still has leg edema  REVIEW OF SYSTEMS:  More than 10 point review of systems were obtained and discussed in length with the patient  Complete review of systems were negative / unremarkable except mentioned above  PHYSICAL EXAM:  Vitals:    10/11/18 1101   BP: 120/60   BP Location: Left arm   Patient Position: Sitting   Cuff Size: Adult   Pulse: 58   Weight: (!) 171 kg (378 lb)   Height: 6' 1" (1 854 m)     Body mass index is 49 87 kg/m²  Physical Exam   Constitutional: He is oriented to person, place, and time  He appears well-nourished  Morbidly obese   HENT:   Head: Normocephalic and atraumatic  Right Ear: External ear normal    Left Ear: External ear normal    Nose: Nose normal    Eyes: Pupils are equal, round, and reactive to light  Conjunctivae and EOM are normal  No scleral icterus  Neck: Neck supple  No JVD present  Cardiovascular: Normal rate and normal heart sounds  Pulmonary/Chest: Effort normal and breath sounds normal  No respiratory distress  He has no wheezes  He has no rales  Abdominal: Soft  Bowel sounds are normal  He exhibits no distension  There is no tenderness  Musculoskeletal: He exhibits edema (Trace leg edema in both legs)  He exhibits no tenderness  Neurological: He is alert and oriented to person, place, and time  Skin: Skin is warm and dry  Psychiatric: He has a normal mood and affect  His behavior is normal    Vitals reviewed        PAST MEDICAL HISTORY:  Past Medical History:   Diagnosis Date    Arthritis     Atrial flutter ([facility] )     Chronic kidney disease     stage 3    Coronary artery disease     2 stents    Fluid retention     Gout     Heart failure (HCC)     pacemaker    Hypertension     Pacemaker     Pulmonary emphysema ([facility] )     Radiculopathy     last assessed 1/28/16     Shortness of breath     exertion    Sleep apnea     c pap       PAST SURGICAL HISTORY:  Past Surgical History:   Procedure Laterality Date    ANGIOPLASTY      x2 2 stents and then replaced   [address]      pacemaker permanent placement dual chamber / last assessed 4/7/14 / implantation     CARDIAC SURGERY      pacemaker    CHOLECYSTECTOMY      CORONARY ANGIOPLASTY WITH STENT PLACEMENT      EPIDURAL BLOCK INJECTION N/A 5/26/2016    Procedure: BLOCK / INJECTION EPIDURAL STEROID LUMBAR  L4-5;  Surgeon: John No MD;  [facility] MAIN OR;  Service:     EYE SURGERY      cataract left    KNEE ARTHROSCOPY W/ MENISCAL REPAIR Left     LUMBAR EPIDURAL INJECTION N/A 3/17/2016    Procedure: BLOCK / INJECTION LUMBAR  L4-5  (C-ARM);   Surgeon: John No MD;  Location: [facility] MAIN OR;  Service:        SOCIAL HISTORY:  History   Alcohol Use No     Comment: social drinker per allscript      History   Drug Use No     History   Smoking Status    Former Smoker    Packs/day: 1 00    Years: 50 00    Quit date: 2015   Smokeless Tobacco    Never Used     Comment: cigarette smoker / current every day smoker / recently stopping smoking per allscript        FAMILY HISTORY:  Family History   Problem Relation Age of Onset    Cancer Mother     Heart disease Mother     Hypertension Mother     Heart disease Father     Heart disease Brother        MEDICATIONS:    Current Outpatient Prescriptions:     albuterol (VENTOLIN HFA) 90 mcg/act inhaler, Inhale 2 puffs every 6 (six) hours as needed for wheezing, Disp: 1 Inhaler, Rfl: 6    allopurinol (ZYLOPRIM) 100 mg tablet, Take 1 tablet (100 mg total) by mouth daily, Disp: 90 tablet, Rfl: 3    ascorbic acid (VITAMIN C) 500 mg tablet, Take 500 mg by mouth daily  , Disp: , Rfl:     B Complex Vitamins (B COMPLEX 1 PO), Take 1 tablet by mouth daily  , Disp: , Rfl:     Biotin (BIOTIN 5000) 5 MG CAPS, Take 1,000 mcg by mouth daily  , Disp: , Rfl:     calcium carbonate-vitamin D (OSCAL-D) 500 mg-200 units per tablet, Take 2 tablets by mouth daily at bedtime  , Disp: , Rfl:     clopidogrel (PLAVIX) 75 mg tablet, TAKE 1 TABLET DAILY, Disp: 90 tablet, Rfl: 3    Cranberry 1000 MG CAPS, Take 1 tablet by mouth 2 (two) times a day , Disp: , Rfl:     felodipine (PLENDIL) 5 mg 24 hr tablet, TAKE 1 TABLET DAILY, Disp: 90 tablet, Rfl: 3    finasteride (PROSCAR) 5 mg tablet, Take 1 tablet (5 mg total) by mouth daily, Disp: 90 tablet, Rfl: 3    Flaxseed, Linseed, (EQL FLAX SEED OIL) 1000 MG CAPS, Take by mouth daily  , Disp: , Rfl:     fluocinonide (LIDEX) 0 05 % cream, Apply topically 2 (two) times a day for 30 days, Disp: 30 g, Rfl: 2    furosemide (LASIX) 40 mg tablet, Take 40 mg by mouth daily as needed  , Disp: , Rfl:     gabapentin (NEURONTIN) 300 mg capsule, Take 1 capsule (300 mg total) by mouth 2 (two) times a day for 30 days, Disp: 60 capsule, Rfl: 2    glucosamine-chondroitin 500-400 MG tablet, Take 1 tablet by mouth 2 (two) times a day , Disp: , Rfl:     lactase (LACTAID) 3,000 units tablet, Take 3,000 Units by mouth as needed  , Disp: , Rfl:     Omega-3 Fatty Acids (FISH OIL) 1,000 mg, Take 1,000 mg by mouth 2 (two) times a day , Disp: , Rfl:     PARoxetine (PAXIL) 20 mg tablet, TAKE 1/2 TABLET BY MOUTH DAILY, Disp: 30 tablet, Rfl: 0    psyllium (METAMUCIL) 58 6 % powder, Take 1 packet by mouth daily Indications: 1 tsp , Disp: , Rfl:     rivaroxaban (XARELTO) tablet, Take 20 mg by mouth , Disp: , Rfl:     simvastatin (ZOCOR) 20 mg tablet, Take 1 tablet (20 mg total) by mouth daily at bedtime, Disp: 90 tablet, Rfl: 3    UNABLE TO FIND, 1,000 Units daily Med Name: vit d3  , Disp: , Rfl:     ciclopirox (LOPROX) 0 77 % cream, Apply topically 2 (two) times a day for 20 days, Disp: 45 g, Rfl: 2    collagenase (SANTYL) ointment, Apply 1 application topically daily  , Disp: , Rfl:     PARoxetine (PAXIL) 20 mg tablet, TAKE 1/2 TABLET BY MOUTH DAILY (Patient not taking: Reported on 10/1/2018), Disp: 30 tablet, Rfl: 0    Lab Results:   Results for orders placed or performed in visit on 97/66/01   Basic metabolic panel   Result Value Ref Range    Glucose 147 (H) 65 - 99 mg/dL    BUN 17 8 - 27 mg/dL    Creatinine 1 32 (H) 0 76 - 1 27 mg/dL    eGFR Non  51 (L) >59 mL/min/1 73    SL AMB EGFR AFRICAN AMERICAN 59 (L) >59 mL/min/1 73    SL AMB BUN/CREATININE RATIO 13 10 - 24    Sodium 138 134 - 144 mmol/L    SL AMB POTASSIUM 4 7 3 5 - 5 2 mmol/L    Chloride 102 96 - 106 mmol/L    CO2 21 20 - 29 mmol/L    CALCIUM 9 7 8 6 - 10 2 mg/dL   Phosphorus   Result Value Ref Range    Phosphorus, Serum 3 4 2 5 - 4 5 mg/dL   Magnesium   Result Value Ref Range    Magnesium, Serum 2 1 1 6 - 2 3 mg/dL   Protein / creatinine ratio, urine   Result Value Ref Range    Creatinine, Urine 224 2 Not Estab  mg/dL    Total Protein, Urine 48 0 Not Estab  mg/dL    Prot/Creat Ratio, Ur 214 (H) 0 - 200 mg/g creat   Urinalysis with reflex to microscopic   Result Value Ref Range    Specific Gravity 1 022 1 005 - 1 030    Ph 5 5 5 0 - 7 5    Color UA Yellow Yellow    Urine Appearance Clear Clear    SL AMB LEUKOCYTE ESTERASE URINE 1+ (A) Negative    Protein 1+ (A) Negative/Trace    Glucose, Urine Negative Negative    SL AMB KETONE, URINE, QUAL  Negative Negative    SL AMB BLOOD, URINE Negative Negative    SL AMB BILIRUBIN, URINE Negative Negative    Urobilinogen Urine 0 2 0 2 - 1 0 EU/dL    SL AMB NITRITES URINE, QUAL   Negative Negative    Microscopic Examination See below:    CBC   Result Value Ref Range    White Blood Cell Count 9 8 3 4 - 10 8 x10E3/uL    Red Blood Cell Count 5 12 4 14 - 5 80 x10E6/uL    Hemoglobin 16 1 13 0 - 17 7 g/dL    HCT 47 3 37 5 - 51 0 %    MCV 92 79 - 97 fL    MCH 31 4 26 6 - 33 0 pg    MCHC 34 0 31 5 - 35 7 g/dL    RDW 14 2 12 3 - 15 4 %    Platelet Count 682 941 - 379 x10E3/uL   Microscopic Examination   Result Value Ref Range    SL AMB WBC, URINE 11-30 (A) 0 - 5 /hpf    RBC, Urine 0-2 0 - 2 /hpf    Epithelial Cells (non renal) 0-10 0 - 10 /hpf    Mucus Threads Present Not Estab      Bacteria, Urine Few None seen/Few

## 2018-11-02 ENCOUNTER — OFFICE VISIT (OUTPATIENT)
Dept: FAMILY MEDICINE CLINIC | Facility: CLINIC | Age: 78
End: 2018-11-02
Payer: MEDICARE

## 2018-11-02 VITALS
BODY MASS INDEX: 41.75 KG/M2 | SYSTOLIC BLOOD PRESSURE: 120 MMHG | RESPIRATION RATE: 20 BRPM | WEIGHT: 315 LBS | TEMPERATURE: 97.7 F | HEART RATE: 70 BPM | HEIGHT: 73 IN | DIASTOLIC BLOOD PRESSURE: 62 MMHG

## 2018-11-02 DIAGNOSIS — L02.831: Primary | ICD-10-CM

## 2018-11-02 DIAGNOSIS — I48.20 CHRONIC ATRIAL FIBRILLATION (HCC): ICD-10-CM

## 2018-11-02 DIAGNOSIS — L02.13 CARBUNCLE OF NECK: ICD-10-CM

## 2018-11-02 DIAGNOSIS — E66.01 MORBID OBESITY (HCC): ICD-10-CM

## 2018-11-02 PROCEDURE — 99214 OFFICE O/P EST MOD 30 MIN: CPT | Performed by: FAMILY MEDICINE

## 2018-11-02 PROCEDURE — 10060 I&D ABSCESS SIMPLE/SINGLE: CPT | Performed by: FAMILY MEDICINE

## 2018-11-02 RX ORDER — DOXYCYCLINE 100 MG/1
100 CAPSULE ORAL 2 TIMES DAILY
Qty: 20 CAPSULE | Refills: 0 | Status: SHIPPED | OUTPATIENT
Start: 2018-11-02 | End: 2018-11-12

## 2018-11-02 NOTE — PROGRESS NOTES
Assessment/Plan:    No problem-specific Assessment & Plan notes found for this encounter  infected seb cyst/carbuncle, I and D done  After care advised  Uti, given doxy 100mg bid x 7d, additional 10d rx given if needed for carbuncle if not better after 7d  Irrigate in shower daily  afib stable on NOAC, bleeding risk aware  No excess bleeding noted   Hemostasis achieved  Tolerated procedure well  Morbid obesity unchanged  Offered more local wt mgmt referral  htn stable       Diagnoses and all orders for this visit:    Carbuncle of occipital region of scalp  -     Incision and Drainage    Carbuncle of neck  -     doxycycline monohydrate (MONODOX) 100 mg capsule; Take 1 capsule (100 mg total) by mouth 2 (two) times a day for 10 days    BMI 45 0-49 9, adult ([facility] )  -     Ambulatory referral to Weight Management; Future    Morbid obesity ([facility] )  -     Ambulatory referral to Weight Management; Future    Chronic atrial fibrillation ([facility] )    Other orders  -     Cancel: Biopsy        Incision and Drainage  Date/Time: 11/2/2018 4:10 PM  Performed by: Genevieve Friedman by: Roshan Rashid     Patient location:  Clinic  Other Assisting Provider: No    Consent:     Consent obtained:  Verbal    Consent given by:  Patient    Risks discussed:  Bleeding, incomplete drainage, infection and pain    Alternatives discussed:  No treatment  Universal protocol:     Patient identity confirmed:  Verbally with patient  Location:     Type:  Abscess    Size:  30    Location:  Head/neck    Head/neck location:  Scalp  Pre-procedure details:     Skin preparation:  Betadine  Anesthesia (see MAR for exact dosages):      Anesthesia method:  Local infiltration    Local anesthetic:  Lidocaine 1% WITH epi  Procedure details:     Complexity:  Simple    Needle aspiration: no      Incision types:  Stab incision    Scalpel blade:  11    Approach:  Open    Incision depth:  Subcutaneous    Wound management:  Probed and deloculated    Drainage: Bloody and purulent    Drainage amount: Moderate    Wound treatment:  Wound left open    Packing materials:  None  Post-procedure details:     Patient tolerance of procedure: Tolerated well, no immediate complications          Return if symptoms worsen or fail to improve  Subjective:      Patient ID: Jean Cantu is a 66 y o  male  Chief Complaint   Patient presents with    Cyst in neck     on the hair line, left side       HPI  Sees urologist, Dr Haynes Tigre, on abx, has uti, just filled  Cyst enlarged recently few wks, smaller for years, just more tender and enlarged and pus noted,   No f/c  Has cpap mask that irritates area  No malaise  Lower left parietal scalp area    uti new, just started on doxy 100mg bid x 7d per [facility]    afib unchanged, no sob or syncope, on NOAC, no bleeding noted      The following portions of the patient's history were reviewed and updated as appropriate: allergies, current medications, past family history, past medical history, past social history, past surgical history and problem list     Review of Systems   Constitutional: Negative for fever  Respiratory: Positive for shortness of breath  Neurological: Negative for dizziness  Current Outpatient Prescriptions   Medication Sig Dispense Refill    albuterol (VENTOLIN HFA) 90 mcg/act inhaler Inhale 2 puffs every 6 (six) hours as needed for wheezing 1 Inhaler 6    allopurinol (ZYLOPRIM) 100 mg tablet Take 1 tablet (100 mg total) by mouth daily 90 tablet 3    ascorbic acid (VITAMIN C) 500 mg tablet Take 500 mg by mouth daily   B Complex Vitamins (B COMPLEX 1 PO) Take 1 tablet by mouth daily   Biotin (BIOTIN 5000) 5 MG CAPS Take 1,000 mcg by mouth daily   calcium carbonate-vitamin D (OSCAL-D) 500 mg-200 units per tablet Take 2 tablets by mouth daily at bedtime        clopidogrel (PLAVIX) 75 mg tablet TAKE 1 TABLET DAILY 90 tablet 3    collagenase (SANTYL) ointment Apply 1 application topically daily   Cranberry 1000 MG CAPS Take 1 tablet by mouth 2 (two) times a day   felodipine (PLENDIL) 5 mg 24 hr tablet TAKE 1 TABLET DAILY 90 tablet 3    finasteride (PROSCAR) 5 mg tablet Take 1 tablet (5 mg total) by mouth daily 90 tablet 3    Flaxseed, Linseed, (EQL FLAX SEED OIL) 1000 MG CAPS Take by mouth daily   furosemide (LASIX) 40 mg tablet Take 40 mg by mouth daily as needed        glucosamine-chondroitin 500-400 MG tablet Take 1 tablet by mouth 2 (two) times a day   lactase (LACTAID) 3,000 units tablet Take 3,000 Units by mouth as needed   Omega-3 Fatty Acids (FISH OIL) 1,000 mg Take 1,000 mg by mouth 2 (two) times a day   PARoxetine (PAXIL) 20 mg tablet TAKE 1/2 TABLET BY MOUTH DAILY 30 tablet 0    psyllium (METAMUCIL) 58 6 % powder Take 1 packet by mouth daily Indications: 1 tsp   rivaroxaban (XARELTO) tablet Take 20 mg by mouth   simvastatin (ZOCOR) 20 mg tablet Take 1 tablet (20 mg total) by mouth daily at bedtime 90 tablet 3    ciclopirox (LOPROX) 0 77 % cream Apply topically 2 (two) times a day for 20 days 45 g 2    doxycycline monohydrate (MONODOX) 100 mg capsule Take 1 capsule (100 mg total) by mouth 2 (two) times a day for 10 days 20 capsule 0    fluocinonide (LIDEX) 0 05 % cream Apply topically 2 (two) times a day for 30 days 30 g 2    gabapentin (NEURONTIN) 300 mg capsule Take 1 capsule (300 mg total) by mouth 2 (two) times a day for 30 days 60 capsule 2     No current facility-administered medications for this visit  Objective:    /62 (BP Location: Left arm, Patient Position: Sitting, Cuff Size: Large)   Pulse 70   Temp 97 7 °F (36 5 °C)   Resp 20   Ht 6' 1" (1 854 m)   Wt (!) 171 kg (377 lb)   BMI 49 74 kg/m²        Physical Exam   Constitutional: He appears well-developed  No distress  HENT:   Head: Normocephalic  Mouth/Throat: No oropharyngeal exudate  Eyes: Conjunctivae are normal  No scleral icterus     Neck: Neck supple  Cardiovascular: Normal rate and intact distal pulses  No murmur heard  Pulmonary/Chest: Effort normal  No respiratory distress  He has no wheezes  Abdominal: Soft  Musculoskeletal: He exhibits no edema or deformity  Neurological: He is alert  Skin: Skin is warm and dry  No rash noted  2cm carbuncle left side of neck with pus   Psychiatric: His behavior is normal  Thought content normal    Nursing note and vitals reviewed               Asia Santana DO

## 2018-11-07 ENCOUNTER — TELEPHONE (OUTPATIENT)
Dept: FAMILY MEDICINE CLINIC | Facility: CLINIC | Age: 78
End: 2018-11-07

## 2018-11-07 DIAGNOSIS — M25.511 ACUTE PAIN OF RIGHT SHOULDER: Primary | ICD-10-CM

## 2018-11-07 NOTE — TELEPHONE ENCOUNTER
Patients wife called states patient  Hurt right shoulder about 1 month ago and would like to know if you could please order and xray for patient  af/rna

## 2018-11-08 ENCOUNTER — TRANSCRIBE ORDERS (OUTPATIENT)
Dept: ADMINISTRATIVE | Facility: HOSPITAL | Age: 78
End: 2018-11-08

## 2018-11-08 DIAGNOSIS — R53.1 WEAKNESS GENERALIZED: Primary | ICD-10-CM

## 2018-11-13 ENCOUNTER — TRANSCRIBE ORDERS (OUTPATIENT)
Dept: ADMINISTRATIVE | Facility: HOSPITAL | Age: 78
End: 2018-11-13

## 2018-11-13 ENCOUNTER — HOSPITAL ENCOUNTER (OUTPATIENT)
Dept: RADIOLOGY | Facility: HOSPITAL | Age: 78
Discharge: HOME | End: 2018-11-13
Attending: FAMILY MEDICINE
Payer: MEDICARE

## 2018-11-13 ENCOUNTER — HOSPITAL ENCOUNTER (OUTPATIENT)
Dept: RADIOLOGY | Facility: HOSPITAL | Age: 78
Discharge: HOME | End: 2018-11-13
Attending: PSYCHIATRY & NEUROLOGY
Payer: MEDICARE

## 2018-11-13 DIAGNOSIS — M25.511 ACUTE PAIN OF RIGHT SHOULDER: ICD-10-CM

## 2018-11-13 DIAGNOSIS — R53.1 WEAKNESS GENERALIZED: ICD-10-CM

## 2018-11-13 PROCEDURE — 73030 X-RAY EXAM OF SHOULDER: CPT

## 2018-11-13 PROCEDURE — 72125 CT NECK SPINE W/O DYE: CPT

## 2018-11-16 ENCOUNTER — TELEPHONE (OUTPATIENT)
Dept: FAMILY MEDICINE CLINIC | Facility: CLINIC | Age: 78
End: 2018-11-16

## 2018-11-16 NOTE — TELEPHONE ENCOUNTER
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

## 2018-11-19 NOTE — TELEPHONE ENCOUNTER
Spoke to patients spouse, she willcall if pain persists for referral to ortho ,  No further action required

## 2018-11-29 ENCOUNTER — TELEPHONE (OUTPATIENT)
Dept: NEPHROLOGY | Facility: CLINIC | Age: 78
End: 2018-11-29

## 2018-11-29 ENCOUNTER — OFFICE VISIT (OUTPATIENT)
Dept: PODIATRY | Facility: CLINIC | Age: 78
End: 2018-11-29
Payer: MEDICARE

## 2018-11-29 VITALS — HEIGHT: 73 IN | BODY MASS INDEX: 41.75 KG/M2 | WEIGHT: 315 LBS

## 2018-11-29 DIAGNOSIS — M54.16 BILATERAL LUMBAR RADICULOPATHY: ICD-10-CM

## 2018-11-29 DIAGNOSIS — M21.969 ACQUIRED DEFORMITY OF ANKLE AND FOOT, UNSPECIFIED LATERALITY: Primary | ICD-10-CM

## 2018-11-29 DIAGNOSIS — B35.9 DERMATOPHYTOSIS: ICD-10-CM

## 2018-11-29 DIAGNOSIS — M25.572 PAIN IN JOINTS OF BOTH FEET: ICD-10-CM

## 2018-11-29 DIAGNOSIS — M25.571 PAIN IN JOINTS OF BOTH FEET: ICD-10-CM

## 2018-11-29 DIAGNOSIS — I70.209 ATHEROSCLEROSIS OF ARTERIES OF EXTREMITIES (HCC): ICD-10-CM

## 2018-11-29 DIAGNOSIS — B35.1 ONYCHOMYCOSIS: ICD-10-CM

## 2018-11-29 PROCEDURE — 99213 OFFICE O/P EST LOW 20 MIN: CPT | Performed by: PODIATRIST

## 2018-11-29 NOTE — TELEPHONE ENCOUNTER
Patient wife called the office and stated that Vernon Randolph would like to increase Gabapentin 300 mg  1 tab 3 times a day  Wife would like to know if this is okay

## 2018-11-29 NOTE — PATIENT INSTRUCTIONS
Patient is continuing to have pain consistent with radiculopathy  We recommend increasing gabapentin dosing  This would be 300 mg t i d   This is pending Nephrology okay

## 2018-11-29 NOTE — PROGRESS NOTES
Procedures   Foot Exam          Assessment/Plan:     No problem-specific Assessment & Plan notes found for this encounter      Acquired deformity of foot, unspecified laterality     Pain in joints of both feet     Dermatophytosis     Peripheral arteriosclerosis (HCC)     Pain in both feet     Onychomycosis    Probable radiculopathy secondary to spinal stenosis        Subjective:   Patient has complaint of pain upon ambulation  Patient has back pain  He has leg pain  Patient has known radiculopathy  He is taking low-dose gabapentin      Patient ID: Giovanny Salazar is a 66 y  o  male      HPI     The following portions of the patient's history were reviewed and updated as appropriate: allergies, current medications, past family history, past medical history, past social history, past surgical history and problem listAssessment  1  Acquired ankle/foot deformity (736 70) (M21 969)   2  Onychomycosis (110 1) (B35 1)   3  Difficulty in walking (719 7) (R26 2)        Discussion/Summary  The patient, patient's family was counseled regarding instructions for management,-- patient and family education  Patient is able to Self-Care  The treatment plan was reviewed with the patient/guardian  The patient/guardian understands and agrees with the treatment plan      Chief Complaint  Patient has pain in both feet  Gerhardt Sep He has pain in his toes with ambulation  He has dry scaly skin      History of Present Illness  HPI: Agent has continued bilateral lower extremity pain  He is taking Lyrica  He takes Percocet when necessary  He has back pain  His legs feel heavy and weak       Review of Systems   Constitutional: no fever-- and-- no chills    ENT: no nasal discharge   Cardiovascular: no palpitations   Gastrointestinal: no constipation   Genitourinary: no urinary hesitancy   Integumentary: no rashes   Neurological: no headache-- and-- no dizziness   Other Symptoms: anxiety insomnia       Active Problems     1  Abrasion (919 0) (T14 8XXA)   2  Acquired ankle/foot deformity (736 70) (M21 969)   3  Acute gout (274 01) (M10 9)   4  Acute gouty arthritis (274 01) (M10 9)   5  Allergic rhinitis (477 9) (J30 9)   6  Aneurysm of abdominal aorta (441 4) (I71 4)   7  Angina pectoris (413 9) (I20 9)   8  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) (F06 4)   9  Arteriosclerosis of coronary artery (414 00) (I25 10)   10  Arthralgia of ankle or foot (719 47) (M25 579)   11  Atherosclerosis of arteries of extremities (440 20) (I70 209)   12  Atrial fibrillation, unspecified type (427 31) (I48 91)   13  Atrial flutter (427 32) (I48 92)   14  Benign hypertension with CKD (chronic kidney disease) stage III (804 88,414  3)  (I12 9,N18 3)   15  Benign prostatic hypertrophy (600 00) (N40 0)   16  Bilateral edema of lower extremity (782 3) (R60 0)   17  Bilateral lumbar radiculopathy (724 4) (M54 16)   18  Blurred vision (368 8) (H53 8)   19  Body mass index (BMI) of 45 0 to 49 9 in adult (V85 42) (Z68 42)   20  Callus (700) (L84)   21  Carpal tunnel syndrome, unspecified laterality (354 0) (G56 00)   22  Cellulitis (682 9) (L03 90)   23  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)   24  Chronic low back pain (724 2,338 29) (M54 5,G89 29)   25  Chronic obstructive pulmonary disease (496) (J44 9)   26  Chronic pain syndrome (338 4) (G89 4)   27  CKD (chronic kidney disease), stage III (585 3) (N18 3)   28  Colon cancer screening (V76 51) (Z12 11)   29  Colon, diverticulosis (562 10) (K57 30)   30  Congestive heart failure (428 0) (I50 9)   31  Coronary artery disease (414 00) (I25 10)   32  Deep venous insufficiency (459 81) (I87 2)   33  Degenerative disc disease, lumbar (722 52) (M51 36)   34  Difficulty in walking (719 7) (R26 2)   35  Encounter for special screening examination for genitourinary disorder (V81 6) (Z13 89)   36  Fecal soiling (787 62) (R15 1)   37  Fibromyalgia (729 1) (M79 7)   38  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 3)   39  Gastrointestinal symptoms (787 99) (R19 8)   40  Gout (274 9) (M10 9)   41  Hyperkalemia (276 7) (E87 5)   42  Hyperlipidemia (272 4) (E78 5)   43  Insomnia, persistent (307 42) (G47 00)   44  Lumbar canal stenosis (724 02) (M48 061)   45  Lumbar neuritis (724 4) (M54 16)   46  Memory loss (780 93) (R41 3)   47  Microscopic hematuria (599 72) (R31 29)   48  Moderate or severe vision impairment, one eye (369 60) (H54 40)   49  Morbid or severe obesity due to excess calories (278 01) (E66 01)   50  Morbid or severe obesity due to excess calories (278 01) (E66 01)   51  Needs flu shot (V04 81) (Z23)   52  Nicotine dependence (305 1) (F17 200)   53  Olecranon bursitis (726 33) (M70 20)   54  Onychogryphosis (703 8)   55  Onychomycosis (110 1) (B35 1)   56  Osteoarthritis, knee/lower leg (715 96) (M17 9)   57  Pacemaker Permanent Placement Dual-Chamber   58  Pain in joints of both feet (719 47) (M79 671,M79 672)   59  Paronychia of toe, unspecified laterality (681 11) (L03 039)   60  Pes planus, congenital (754 61) (Q66 50)   61  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)   62  Presence of cardiac pacemaker (V45 01) (Z95 0)   63  Proteinuria (791 0) (R80 9)   64  Radiculopathy (729 2) (M54 10)   65  Screening for genitourinary condition (V81 6) (Z13 89)   66  Skin tear of right lower leg without complication, initial encounter (891 0) (S81 811A)   67  Sleep apnea (780 57) (G47 30)   68  Sleep apnea (780 57) (G47 30)   69  Tenderness in limb (729 5) (M79 609)   70  Tendonitis (726 90) (M77 9)   71  Tinea corporis (110 5) (B35 4)   72  Urinary incontinence (788 30) (R32)   73  Venous insufficiency (chronic) (peripheral) (459 81) (I87 2)     Past Medical History   · History of Atrial flutter (427 32) (I48 92)   · History of Cellulitis of foot (682 7) (L03 119)   · History of Colon cancer screening (V76 51) (Z12 11)   · History of Difficulty in walking (719 7) (R26 2)   · History of Difficulty in walking (719 7) (R26 2)   · General medical examination (V70 9) (Z00 00)   · History of backache (V13 59) (Z87 39)   · History of radiculopathy (V12 49) (Z86 69)   · 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)     The active problems and past medical history were reviewed and updated today       Surgical History   · History of Cath Stent Placement   · History of Cholecystectomy   · History of Knee Surgery   · Pacemaker Permanent Placement Dual-Chamber   · History of Permanent Pacemaker Implantation Date     The surgical history was reviewed and updated today        Family History  Mother    · Family history of Cancer   · Family history of Family Health Status Of Mother -    · Family history of Heart Disease (V17 49)   · Family history of Hypertension (V17 49)   · Family history of Mother  At Age 80  Father    · Family history of Father  At Age 47   · Family history of Heart Disease (V17 49)  Brother    · Family history of Heart Disease (V17 49)     The family history was reviewed and updated today        Social History      · Denied: Alcohol Use (History)   · Being A Social Drinker   · Cigarette smoker (305 1) (F17 210)   · Current Every Day Smoker (305 1)   · Current Smoker (V15 82)   · Daily Tea Consumption (10  Cups/Day)   · Denied: Drug Use (305 90)   · Marital History - Currently    · Recently Stopping Smoking (V15 82)  The social history was reviewed and updated today  The social history was reviewed and is unchanged       Current Meds   1  Allopurinol 100 MG Oral Tablet; TAKE ONE TABLET BY MOUTH EVERY DAY FOR GOUT; Therapy: 96YFG5392 to (Evaluate:72Ojn3227)  Requested for: 2017; Last Rx:2017 Ordered   2  Amoxicillin 500 MG Oral Tablet; 4 po 1 hour prior to procedure  Requested for: 03DHF8779; Last Rx:2017 Ordered   3  B Complex Oral Capsule; TAKE 1 CAPSULE DAILY;  Therapy: (Recorded:2014) to Recorded   4  Betamethasone Dipropionate 0 05 % External Cream; APPLY SPARINGLY TO AFFECTED AREA(S) TWICE DAILY; Therapy: 72Mjn8157 to (Last Rx:94Oum9522)  Requested for: 65Eim4532 Ordered   5  Biotin 1000 MCG Oral Tablet; Take 1 tablet daily; Therapy: (Recorded:08Apr2014) to Recorded   6  Calcium-Carb 600 600 MG Oral Tablet; TAKE 2 TABLETS DAILY; Therapy: 65NSF7994 to  Requested for: 21Aug2017 Recorded   7  Centrum Silver Ultra Mens Oral Tablet; 1 tab daily; Therapy: 18ODL3127 to  Requested for: 02OUZ7930 Recorded   8  Cinnamon 500 MG Oral Capsule; Take 1 capsule twice daily; Therapy: (Recorded:08Apr2014) to Recorded   9  ClonazePAM 1 MG Oral Tablet Disintegrating; PLACE 1 TABLET ON TONGUE AND ALLOW TO DISSOLVE TWICE DAILY AS NEEDED; Therapy: 34PVY8123 to (Evaluate:16Sep2016); Last Rx:38Xnc5741 Ordered   10  Clopidogrel Bisulfate 75 MG Oral Tablet; take one tablet by mouth every day;  Therapy: 72TGK5528 to (Evaluate:17Gxa6306)  Requested for: 11Aug2017; Last  Rx:11Aug2017 Ordered   11  Clotrimazole 1 % External Cream; APPLY 2-3 TIMES DAILY TO AFFECTED AREA(S);  Therapy: 21Aug2017 to (Last Rx:73Pti6080)  Requested for: 21Aug2017 Ordered   12  Colcrys 0 6 MG Oral Tablet; TAKE 1 TABLET DAILY AS DIRECTED;  Therapy: 54NRJ9005 to (Evaluate:21Jun2016); Last Rx:24Mar2016 Ordered   13  Cranberry Fruit Concentrate 34273 MG Oral Capsule;  Take 1 capsule twice daily  Recorded   14  [employer] Fish Oil CAPS; TAKE 1 CAPSULE Daily Recorded   15  Donepezil HCl - 5 MG Oral Tablet; 1 every day;  Therapy: 94Mbb7938 to (Last Rx:66Ugg6061)  Requested for: 77Idm0183 Ordered   16  Felodipine ER 5 MG Oral Tablet Extended Release 24 Hour; take 1 tablet by mouth once [place]: 48OLR0569 to (RCABSZJC:04VIC1796)  Requested for: 56Qbf2374; Last  Rx:88Wju8023 Ordered   17  Finasteride 5 MG Oral Tablet; 1 DAILY;  Therapy: 06TVJ0757 to (Last Rx:76Wah2557)  Requested for: 93Eaw3538 Ordered   18  Flax Seed Oil 1000 MG Oral Capsule; 2 tabs daily; [facility]: 42SAQ9664 to  Requested for: 48PRV0201 Recorded   19  Ginkgo Biloba 120 MG Oral Capsule; Take 1 capsule twice daily;  Therapy: (Recorded:21Apr2014) to Recorded   20  Glucosamine 1500 Complex Oral Capsule; TAKE 2 CAPSULE Twice daily;  Therapy: (Recorded:08Apr2014) to Recorded   21  Lactaid U Trati 1724; CHEW AND SWALLOW 1 TO 3 TABLETS WITH FIRST BITE  OF MILK FOOD;  Therapy: (Recorded:21Apr2014) to Recorded   22  Magnesium 500 MG Oral Capsule; take 1 capsule daily;  Therapy: (Recorded:21Apr2014) to Recorded   23  Nitrostat 0 4 MG Sublingual Tablet Sublingual; DISSOLVE 1 TABLET UNDER THE  TONGUE AS NEEDED FOR CHEST PAIN;  Therapy: 29XVS2493 to (Evaluate:29Oct2015)  Requested for: 62CUO8108; Last  Rx:94Cni0718 Ordered   24  PARoxetine HCl - 20 MG Oral Tablet; 1 EVERY MORNING;  Therapy: 25XNS9557 to (Last Rx:15Zqf5395)  Requested for: 54Zpl6827 Ordered   25  ProAir  (90 Base) MCG/ACT Inhalation Aerosol Solution; 2 puffs Q4 hours prn  SOB/wheeze;  Therapy: 94UDN5118 to (Last Rx:19Nov2013)  Requested for: 94HLA0290 Ordered   26  Selenium  MCG Oral Tablet Extended Release; 1 tab daily;  Therapy: 65SHK3907 to  Requested for: 78RJK0227 Recorded   27  Silver Sulfadiazine 1 % External Cream; APPLY TO AFFECTED AREA TWICE DAILY AS [facility] [place]: 87VRM8311 to (Last Rx:06Apr2016)  Requested for: 80MWE5239 Ordered   28  Simvastatin 20 MG Oral Tablet; 1 TAB DAILY;  Therapy: 33FBK7600 to (Last Rx:88Grq9301)  Requested for: 48Wmv4751 Ordered   29  TraMADol HCl - 50 MG Oral Tablet; TAKE 1 TABLET 4 TIMES DAILY AS NEEDED FOR  PAIN; Last Rx:86Ygw0560 Ordered   30  Vitamin C 500 MG Oral Tablet; TAKE 1 TABLET DAILY;  Therapy: (Recorded:21Apr2014) to Recorded   31  Vitamin D 1000 UNIT Oral Tablet; TAKE 1 TABLET DAILY;  Therapy: (Recorded:08Apr2014) to Recorded   32  Xarelto 20 MG Oral Tablet; One a day;  Therapy: 78BET1066 to (Last Rx:82Yyv4655) Ordered   33  Zinc 50 MG Oral Tablet; TAKE 1 TABLET DAILY;  Therapy: (Recorded:08Apr2014) to Recorded     The medication list was reviewed and updated today        Allergies  1  No Known Drug Allergies     Vitals        Heart Rate 86   Respiration 20   Systolic 099   Diastolic 84   Height 6 ft    Weight 360 lb    BMI Calculated 48 83   BSA Calculated 2 73      Physical Exam  Left Foot: Appearance: Normal except as noted: excessive pronation-- and-- pes planus  Second toe deformities include hammer toe  Third toe deformities include hammer toe  Forth toe deformities include hammer toe  Fifth toe deformities include hammer toe  Tenderness: None except the great toe,-- distal first metatarsal,-- distal fifth metatarsal-- and-- insertion of the plantar fascia  Positive Brian sign third left intermetatarsal space  ROM: Full  Motor: Normal   Right Foot: Appearance: Normal except as noted: excessive pronation-- and-- pes planus  Second toe deformities include hammer toe  Third toe deformities include hammer toe  Forth toe deformities include hammer toe  Fifth toe deformities include hammer toe  Evaluation of the great toe nail demonstrates an ingrown nail  Tenderness: None except the insertion of the plantar fascia  Left Ankle: Appearance: Normal except erythema,-- swelling-- and-- swelling laterally  Tenderness: None except the tibiotalar joint  Right Ankle: ROM: Full  Neurological Exam: performed  Deep tendon reflexes: + tinel of right tibial nerve  Vascular Exam: performed Dorsalis pedis pulses were present bilaterally  Posterior tibial pulses were present bilaterally  Elevation Pallor: absent bilaterally  Capillary refill time was less than 1 second bilaterally  Edema: moderate bilaterally-- and-- 4/7 pitting  neg  buddy  Toenails: All of the toenails were elongated,-- hypertrophied,-- discolored,-- ingrown,-- shown to have subungual debris,-- tender-- and-- Mycotic with onychauxis  Hyperkeratosis: present on both first toes,-- present on both first sub metatarsals-- and-- present on both fifth sub metatarsals  Shoe Gear Evaluation: performed ()  Recommendation(s): extra depth diabetic shoes       Procedure  All abnormal skin a mycotic nails debrided without complication her pain  Gentian violet applied to webspace   All abnormal plantar skin debrided    Patient remain on gabapentin

## 2018-12-05 ENCOUNTER — OFFICE VISIT (OUTPATIENT)
Dept: OBGYN CLINIC | Facility: CLINIC | Age: 78
End: 2018-12-05
Payer: MEDICARE

## 2018-12-05 VITALS
BODY MASS INDEX: 41.75 KG/M2 | HEIGHT: 73 IN | DIASTOLIC BLOOD PRESSURE: 69 MMHG | HEART RATE: 70 BPM | SYSTOLIC BLOOD PRESSURE: 122 MMHG | WEIGHT: 315 LBS

## 2018-12-05 DIAGNOSIS — M75.81 TENDINITIS OF RIGHT ROTATOR CUFF: Primary | ICD-10-CM

## 2018-12-05 PROCEDURE — 20610 DRAIN/INJ JOINT/BURSA W/O US: CPT | Performed by: ORTHOPAEDIC SURGERY

## 2018-12-05 PROCEDURE — 99214 OFFICE O/P EST MOD 30 MIN: CPT | Performed by: ORTHOPAEDIC SURGERY

## 2018-12-05 RX ORDER — DEXAMETHASONE SODIUM PHOSPHATE 100 MG/10ML
40 INJECTION INTRAMUSCULAR; INTRAVENOUS
Status: COMPLETED | OUTPATIENT
Start: 2018-12-05 | End: 2018-12-05

## 2018-12-05 RX ORDER — LIDOCAINE HYDROCHLORIDE 5 MG/ML
1 INJECTION, SOLUTION INFILTRATION; PERINEURAL
Status: COMPLETED | OUTPATIENT
Start: 2018-12-05 | End: 2018-12-05

## 2018-12-05 RX ADMIN — LIDOCAINE HYDROCHLORIDE 1 ML: 5 INJECTION, SOLUTION INFILTRATION; PERINEURAL at 16:17

## 2018-12-05 RX ADMIN — DEXAMETHASONE SODIUM PHOSPHATE 40 MG: 100 INJECTION INTRAMUSCULAR; INTRAVENOUS at 16:17

## 2018-12-05 NOTE — PROGRESS NOTES
Assessment/Plan:  1  Tendinitis of right rotator cuff  Ambulatory referral to Physical Therapy       Scribe Attestation    I,:   Ava Jensen MA am acting as a scribe while in the presence of the attending physician :        I,:   Jonh Buckner MD personally performed the services described in this documentation    as scribed in my presence :              I discussed with Nicole Menchaca today that his signs and symptoms are consistent with right rotator cuff tendonitis  On examination today empty can testing is positive as well as positive drop arm  He is negative for impingement  His x-rays do not demonstrate any fractures or dislocations  He does have very mild AC joint arthritis  Treatment options was discussed with him in the form of formal physical therapy as well as a steroid injection  The steroid injection was performed in the office today without any complications  A referral was also provided for physical therapy  He will follow up in 6 weeks for repeat evaluation  Subjective:   Cyndee Meigs is a 66 y o  male who presents to the office today for evaluation of right shoulder pain  He states he had a fall 6 weeks ago landing onto is right shoulder  He presented to his PCP's office where x-rays were taken and showed no signs of fractures or dislocations  He notes an intermittent pain to the anterior aspect of his shoulder that is increased with reaching behind the back and overhead  He denies any radiating pain  He has not taken anything OTC for his pain complaints  He denies any numbness or tingling  Review of Systems   Constitutional: Negative for chills and fever  HENT: Negative for drooling and sneezing  Eyes: Negative for redness  Respiratory: Positive for cough, shortness of breath and wheezing  Cardiovascular: Positive for leg swelling  Gastrointestinal: Negative for nausea and vomiting  Musculoskeletal: Positive for arthralgias   Negative for joint swelling and myalgias  Neurological: Negative for weakness and numbness  Psychiatric/Behavioral: Negative for behavioral problems  The patient is not nervous/anxious  Past Medical History:   Diagnosis Date    Arthritis     [facility] flutter ([facility] [address] [facility]     Chronic kidney disease     stage 3    Coronary artery disease     2 stents    Fluid retention     Gout     Heart failure (HCC)     pacemaker    Hypertension     Pacemaker     Pulmonary emphysema ([facility] [address] [facility]     Radiculopathy     last assessed 1/28/16     Shortness of breath     exertion    Sleep apnea     c pap       Past Surgical History:   Procedure Laterality Date    ANGIOPLASTY      x2 2 stents and then replaced   [address]      pacemaker permanent placement dual chamber / last assessed 4/7/14 / implantation     CARDIAC SURGERY      pacemaker    CHOLECYSTECTOMY      CORONARY ANGIOPLASTY WITH STENT PLACEMENT      EPIDURAL BLOCK INJECTION N/A 5/26/2016    Procedure: BLOCK / INJECTION EPIDURAL STEROID LUMBAR  L4-5;  Surgeon: Darek Chavarria MD;  Location[facility] MAIN OR;  Service:     EYE SURGERY      cataract left    KNEE ARTHROSCOPY W/ MENISCAL REPAIR Left     LUMBAR EPIDURAL INJECTION N/A 3/17/2016    Procedure: BLOCK / INJECTION LUMBAR  L4-5  (C-ARM);   Surgeon: Darek Chavarria MD;  Location: [facility] MAIN OR;  Service:        Family History   Problem Relation Age of Onset    Cancer Mother     Heart disease Mother     Hypertension Mother     Heart disease Father     Heart disease Brother        Social History     Occupational History    RETIRED      Social History Main Topics    Smoking status: Former Smoker     Packs/day: 1 00     Years: 50 00     Quit date: 11/14/2018    Smokeless tobacco: Never Used    Alcohol use No      Comment: social drinker per allscript     Drug use: No    Sexual activity: Not on file         Current Outpatient Prescriptions:     albuterol (VENTOLIN HFA) 90 mcg/act inhaler, Inhale 2 puffs every 6 (six) hours as needed for wheezing, Disp: 1 Inhaler, Rfl: 6    allopurinol (ZYLOPRIM) 100 mg tablet, Take 1 tablet (100 mg total) by mouth daily, Disp: 90 tablet, Rfl: 3    ascorbic acid (VITAMIN C) 500 mg tablet, Take 500 mg by mouth daily  , Disp: , Rfl:     B Complex Vitamins (B COMPLEX 1 PO), Take 1 tablet by mouth daily  , Disp: , Rfl:     Biotin (BIOTIN 5000) 5 MG CAPS, Take 1,000 mcg by mouth daily  , Disp: , Rfl:     calcium carbonate-vitamin D (OSCAL-D) 500 mg-200 units per tablet, Take 2 tablets by mouth daily at bedtime  , Disp: , Rfl:     clopidogrel (PLAVIX) 75 mg tablet, TAKE 1 TABLET DAILY, Disp: 90 tablet, Rfl: 3    collagenase (SANTYL) ointment, Apply 1 application topically daily  , Disp: , Rfl:     Cranberry 1000 MG CAPS, Take 1 tablet by mouth 2 (two) times a day , Disp: , Rfl:     felodipine (PLENDIL) 5 mg 24 hr tablet, TAKE 1 TABLET DAILY, Disp: 90 tablet, Rfl: 3    finasteride (PROSCAR) 5 mg tablet, Take 1 tablet (5 mg total) by mouth daily, Disp: 90 tablet, Rfl: 3    Flaxseed, Linseed, (EQL FLAX SEED OIL) 1000 MG CAPS, Take by mouth daily  , Disp: , Rfl:     furosemide (LASIX) 40 mg tablet, Take 40 mg by mouth daily as needed  , Disp: , Rfl:     glucosamine-chondroitin 500-400 MG tablet, Take 1 tablet by mouth 2 (two) times a day , Disp: , Rfl:     lactase (LACTAID) 3,000 units tablet, Take 3,000 Units by mouth as needed  , Disp: , Rfl:     Omega-3 Fatty Acids (FISH OIL) 1,000 mg, Take 1,000 mg by mouth 2 (two) times a day , Disp: , Rfl:     PARoxetine (PAXIL) 20 mg tablet, TAKE 1/2 TABLET BY MOUTH DAILY, Disp: 30 tablet, Rfl: 0    psyllium (METAMUCIL) 58 6 % powder, Take 1 packet by mouth daily Indications: 1 tsp , Disp: , Rfl:     rivaroxaban (XARELTO) tablet, Take 20 mg by mouth , Disp: , Rfl:     simvastatin (ZOCOR) 20 mg tablet, Take 1 tablet (20 mg total) by mouth daily at bedtime, Disp: 90 tablet, Rfl: 3    ciclopirox (LOPROX) 0 77 % cream, Apply topically 2 (two) times a day for 20 days, Disp: 45 g, Rfl: 2    fluocinonide (LIDEX) 0 05 % cream, Apply topically 2 (two) times a day for 30 days, Disp: 30 g, Rfl: 2    gabapentin (NEURONTIN) 300 mg capsule, Take 1 capsule (300 mg total) by mouth 2 (two) times a day for 30 days (Patient taking differently: Take 300 mg by mouth 3 (three) times a day  ), Disp: 60 capsule, Rfl: 2    No Known Allergies    Objective:  Vitals:    12/05/18 1327   BP: 122/69   Pulse: 70       Right Shoulder Exam     Range of Motion   Active Abduction: 160   External Rotation: 60   Internal Rotation 0 degrees: L3     Muscle Strength   Internal Rotation: 5/5   External Rotation: 5/5   Supraspinatus: 4/5     Tests   Drop Arm: positive  Impingement: negative    Other   Erythema: absent  Sensation: normal  Pulse: present    Comments:  Positive empty can            Physical Exam   Constitutional: He is oriented to person, place, and time  He appears well-developed and well-nourished  HENT:   Head: Normocephalic and atraumatic  Eyes: Conjunctivae are normal  Right eye exhibits no discharge  Left eye exhibits no discharge  Neck: Normal range of motion  Neck supple  Cardiovascular: Normal rate and intact distal pulses  Pulmonary/Chest: Effort normal  No respiratory distress  Musculoskeletal:   As noted in HPI   Neurological: He is alert and oriented to person, place, and time  Skin: Skin is warm and dry  Psychiatric: He has a normal mood and affect  His behavior is normal  Judgment and thought content normal          I have personally reviewed pertinent films in PACS and my interpretation is as follows:X-ray right shoulder performed on 11/13/18 demonstrates no signs of fractures or dislocations mild AC joint arthritis       Large joint arthrocentesis  Date/Time: 12/5/2018 4:17 PM  Consent given by: patient  Site marked: site marked  Timeout: Immediately prior to procedure a time out was called to verify the correct patient, procedure, equipment, support staff and site/side marked as required   Supporting Documentation  Indications: pain   Procedure Details  Location: shoulder - R subacromial bursa  Preparation: Patient was prepped and draped in the usual sterile fashion  Needle size: 22 G  Ultrasound guidance: no  Approach: posterior  Medications administered: 1 mL lidocaine 0 5 %; 40 mg dexamethasone 100 mg/10 mL    Patient tolerance: patient tolerated the procedure well with no immediate complications  Dressing:  Sterile dressing applied

## 2018-12-07 DIAGNOSIS — M54.17 LUMBOSACRAL RADICULOPATHY: Primary | ICD-10-CM

## 2018-12-07 RX ORDER — GABAPENTIN 300 MG/1
300 CAPSULE ORAL 3 TIMES DAILY
Qty: 90 CAPSULE | Refills: 1 | Status: SHIPPED | OUTPATIENT
Start: 2018-12-07 | End: 2019-01-17 | Stop reason: SDUPTHER

## 2018-12-10 ENCOUNTER — OFFICE VISIT (OUTPATIENT)
Dept: BARIATRICS | Facility: CLINIC | Age: 78
End: 2018-12-10
Payer: MEDICARE

## 2018-12-10 VITALS
SYSTOLIC BLOOD PRESSURE: 122 MMHG | HEIGHT: 73 IN | DIASTOLIC BLOOD PRESSURE: 70 MMHG | HEART RATE: 68 BPM | RESPIRATION RATE: 16 BRPM | BODY MASS INDEX: 41.75 KG/M2 | WEIGHT: 315 LBS | TEMPERATURE: 98.4 F

## 2018-12-10 DIAGNOSIS — N18.30 CKD (CHRONIC KIDNEY DISEASE), STAGE III (HCC): ICD-10-CM

## 2018-12-10 DIAGNOSIS — G47.30 SLEEP APNEA, UNSPECIFIED TYPE: ICD-10-CM

## 2018-12-10 DIAGNOSIS — E66.01 MORBID OBESITY (HCC): Primary | ICD-10-CM

## 2018-12-10 DIAGNOSIS — I12.9 BENIGN HYPERTENSION WITH CKD (CHRONIC KIDNEY DISEASE) STAGE III (HCC): ICD-10-CM

## 2018-12-10 DIAGNOSIS — E66.09 OBESITY DUE TO EXCESS CALORIES WITH SERIOUS COMORBIDITY, UNSPECIFIED CLASSIFICATION: ICD-10-CM

## 2018-12-10 DIAGNOSIS — N18.30 BENIGN HYPERTENSION WITH CKD (CHRONIC KIDNEY DISEASE) STAGE III (HCC): ICD-10-CM

## 2018-12-10 DIAGNOSIS — I48.20 CHRONIC ATRIAL FIBRILLATION (HCC): ICD-10-CM

## 2018-12-10 PROBLEM — R73.09 ELEVATED GLUCOSE: Status: ACTIVE | Noted: 2018-12-10

## 2018-12-10 PROCEDURE — 99214 OFFICE O/P EST MOD 30 MIN: CPT | Performed by: PHYSICIAN ASSISTANT

## 2018-12-10 NOTE — ASSESSMENT & PLAN NOTE
-per patient he is not a diabetic   He reports he is never fasting when he goes for bloodwork  -will await labs to see if HgbA1c has been done

## 2018-12-10 NOTE — PATIENT INSTRUCTIONS
Goals: Food log (ie ) www myfitnesspal com,sparkpeople  com,loseit com,calorieking  com,etc    No sugary beverages  At least 64oz of water daily  Increase physical activity by 10 minutes daily  Gradually increase physical activity to a goal of 5 days per week for 30 minutes of MODERATE intensity PLUS 2 days per week of FULL BODY resistance training  2000 calories per day  5-10 servings of fruits and vegetables per day  Calorie Clydie Call book  Or Calorieking  Com   grams of protein per day

## 2018-12-10 NOTE — ASSESSMENT & PLAN NOTE
-baseline creatinine 1 2-1 5  -GFR 51  -Recommend 1 0 - 1 2 grams of protein per kg of IBW    grams of protein per day  -per patient has 2 gram sodium restriction and avoids food high in potassium

## 2018-12-10 NOTE — PROGRESS NOTES
Assessment/Plan: Morbid obesity ([facility] [place] [facility] )  -Discussed options of [facility]ensive Lifestyle Intervention Program and Conservative Program and the role of weight loss medications  Per patient he reported cardiologist would not clear him for elective surgery so therefore bariatric surgery options were not reviewed  -Initial weight loss goal of 5-10% weight loss for improved health  -Screening labs: reports had recent labs done at [facility], requested to review  -Patient is interested in pursuing conservative program    Benign hypertension with CKD (chronic kidney disease) stage III  -continue antihypertensives  -stable    Sleep apnea  -reports compliant with CPAP    CKD (chronic kidney disease), stage III  -baseline creatinine 1 2-1 5  -GFR 51  -Recommend 1 0 - 1 2 grams of protein per kg of IBW   grams of protein per day  -per patient has 2 gram sodium restriction and avoids food high in potassium    Atrial fibrillation (HCC)  -Hx CAD/HLD  -on Xarelto/Plavix/Statin therapy  -managed by cardiology    Elevated glucose  -per patient he is not a diabetic  He reports he is never fasting when he goes for bloodwork  -will await labs to see if HgbA1c has been done    Goals:    Food log (ie ) www myfitnesspal com,sparkpeople  com,loseit com,calorieking  com,etc    No sugary beverages  At least 64oz of water daily  Increase physical activity by 10 minutes daily  Gradually increase physical activity to a goal of 5 days per week for 30 minutes of MODERATE intensity PLUS 2 days per week of FULL BODY resistance training  2000 calories per day  5-10 servings of fruits and vegetables per day  Calorie Christofer Ludwig book  Or Calorieking  Com   grams of protein per day    Follow up in approximately 6 weeks with Non-Surgical Physician/Advanced Practitioner      Diagnoses and all orders for this visit:    Morbid obesity ([facility] )    Benign hypertension with CKD (chronic kidney disease) stage III ([facility] )    Sleep apnea, unspecified type    CKD (chronic kidney disease), stage III (HCC)    Obesity due to excess calories with serious comorbidity, unspecified classification  -     Ambulatory referral to Weight Management    Chronic atrial fibrillation ([facility] )    Other orders  -     cyanocobalamin 1000 MCG tablet; Take 100 mcg by mouth daily          Subjective:   Chief Complaint   Patient presents with    Consult     Pt is here for MWM consult  Patient ID: Misa Lucero  is a 66 y o  male with excess weight/obesity here to pursue weight managment  Past Medical History:   Diagnosis Date    Arthritis     Atrial flutter ([facility] )     Chronic kidney disease     stage 3    Coronary artery disease     2 stents    Fluid retention     Gout     Heart failure ([facility] )     pacemaker    Hypertension     Pacemaker     Pulmonary emphysema ([place] [address] )     Radiculopathy     last assessed 1/28/16     Shortness of breath     exertion    Sleep apnea     c pap         HPI:  Obesity/Excess Weight:  Severity: Severe  Onset:  Was 303 lbs 3 years ago, reports he retired 3 years ago and gained a    Modifiers: Read eat to live book  Contributing factors: Retiring, eating large portions  Associated symptoms: decreased mobility    Goals: No specific number, wants to lose weight to feel better and move better    B: bowl of cereal + serving of fruit  S: skips  L: nachos + cheese + peppers/onions  S: skips  D: baked chicken + rice + veggie + salad + soup (chicken/turkey/beans)  S: ice cream or apple turnover or SF jello or pudding or cookies + fruit    Fluid: water 32oz, 32oz decaf zero calorie tea, rare hot chocolate  Exercise: none      The following portions of the patient's history were reviewed and updated as appropriate: allergies, current medications, past family history, past medical history, past social history, past surgical history and problem list     Review of Systems   Constitutional: Negative for chills and fever  HENT: Negative for sore throat  Respiratory: Positive for shortness of breath (reports has hx COPD)  Negative for cough  Cardiovascular: Negative for chest pain and palpitations  Gastrointestinal: Positive for constipation  Negative for abdominal pain, diarrhea, nausea and vomiting  Musculoskeletal: Positive for arthralgias (bilateral knees)  Skin: Positive for rash (fungal rah under panus)  Neurological: Negative for dizziness and headaches  Psychiatric/Behavioral: The patient is nervous/anxious (reports anxiety well controlled with paxil)  Denies depression       Objective:    /70 (BP Location: Right arm, Patient Position: Sitting, Cuff Size: Large)   Pulse 68   Temp 98 4 °F (36 9 °C) (Tympanic)   Resp 16   Ht 6' 1" (1 854 m)   Wt (!) 173 kg (382 lb 2 oz)   BMI 50 42 kg/m²     Physical Exam   Nursing note and vitals reviewed  Constitutional   General appearance: Abnormal   well developed and morbidly obese  Eyes No conjunctival pallor  Ears, Nose, Mouth, and Throat Oral mucosa moist    Pulmonary   Respiratory effort: No increased work of breathing or signs of respiratory distress  Auscultation of lungs: Clear to auscultation, equal breath sounds bilaterally, no wheezes, no rales, no rhonci  Cardiovascular   Auscultation of heart: Normal rate and rhythm, normal S1 and S2, without murmurs  Examination of extremities for edema and/or varicosities: +2 LE edema  Abdomen   Abdomen: Abnormal   The abdomen was obese  Bowel sounds were normal  The abdomen was soft and nontender     Musculoskeletal   Gait and station: Normal     Psychiatric   Orientation to person, place and time: Normal     Affect: appropriate

## 2018-12-10 NOTE — ASSESSMENT & PLAN NOTE
-Discussed options of [facility]-Intensive Lifestyle Intervention Program and Conservative Program and the role of weight loss medications  Per patient he reported cardiologist would not clear him for elective surgery so therefore bariatric surgery options were not reviewed    -Initial weight loss goal of 5-10% weight loss for improved health  -Screening labs: reports had recent labs done at [facility], requested to review  -Patient is interested in pursuing conservative program

## 2018-12-12 ENCOUNTER — TELEPHONE (OUTPATIENT)
Dept: BARIATRICS | Facility: CLINIC | Age: 78
End: 2018-12-12

## 2018-12-12 DIAGNOSIS — R73.09 ELEVATED GLUCOSE: Primary | ICD-10-CM

## 2018-12-12 DIAGNOSIS — E66.01 MORBID OBESITY WITH BMI OF 50.0-59.9, ADULT (HCC): ICD-10-CM

## 2018-12-12 DIAGNOSIS — R63.5 ABNORMAL WEIGHT GAIN: ICD-10-CM

## 2018-12-12 NOTE — TELEPHONE ENCOUNTER
Please call patient and let him know I received this lab results  Would recommend a HgbA1c to check his blood sugar and TSH for thyroid  I can order these and we can mail them out? Please let me know what patient would like to do   Thank you

## 2018-12-12 NOTE — TELEPHONE ENCOUNTER
I called and spoke to patient and his wife  He is agreeable to have labs done  Pt's wife will call the insurance to make sure the tests are covered  Please mail labs   thank you

## 2018-12-17 DIAGNOSIS — M79.672 PAIN IN BOTH FEET: ICD-10-CM

## 2018-12-17 DIAGNOSIS — M54.16 RADICULOPATHY, LUMBAR REGION: ICD-10-CM

## 2018-12-17 DIAGNOSIS — M79.671 PAIN IN BOTH FEET: ICD-10-CM

## 2018-12-17 RX ORDER — GABAPENTIN 300 MG/1
300 CAPSULE ORAL 2 TIMES DAILY
Qty: 60 CAPSULE | Refills: 2 | Status: SHIPPED | OUTPATIENT
Start: 2018-12-17 | End: 2019-02-26 | Stop reason: SDUPTHER

## 2018-12-20 DIAGNOSIS — J42 CHRONIC BRONCHITIS, UNSPECIFIED CHRONIC BRONCHITIS TYPE (HCC): Primary | ICD-10-CM

## 2018-12-20 NOTE — TELEPHONE ENCOUNTER
Patient's wife called for a refill of advair  Med is not on patient's med list  Patient's wife stated that patient was originally on advair, due to pharmacy problems,had to switch to another inhaler, now back on advair again   Tanesha Santos MA

## 2019-01-02 DIAGNOSIS — M79.671 PAIN IN BOTH FEET: Primary | ICD-10-CM

## 2019-01-02 DIAGNOSIS — M79.672 PAIN IN BOTH FEET: Primary | ICD-10-CM

## 2019-01-02 RX ORDER — TRAMADOL HYDROCHLORIDE 50 MG/1
TABLET ORAL
Qty: 30 TABLET | Refills: 0 | Status: SHIPPED | OUTPATIENT
Start: 2019-01-02 | End: 2019-02-02

## 2019-01-02 NOTE — PROGRESS NOTES
Phone call  Patient has leg pain  Patient is known diabetic with back pain  He is taking gabapentin as directed  The pain has been ongoing for several weeks  Plan  Office visit to this office  Rule out PID  Rule out DVT  Referral to primary care  Referral to vascular surgery    Will help with patient's pain by using tramadol

## 2019-01-03 ENCOUNTER — OFFICE VISIT (OUTPATIENT)
Dept: PODIATRY | Facility: CLINIC | Age: 79
End: 2019-01-03
Payer: MEDICARE

## 2019-01-03 VITALS
HEIGHT: 73 IN | RESPIRATION RATE: 17 BRPM | WEIGHT: 315 LBS | BODY MASS INDEX: 41.75 KG/M2 | HEART RATE: 68 BPM | SYSTOLIC BLOOD PRESSURE: 122 MMHG | DIASTOLIC BLOOD PRESSURE: 70 MMHG

## 2019-01-03 DIAGNOSIS — M77.42 METATARSALGIA OF BOTH FEET: ICD-10-CM

## 2019-01-03 DIAGNOSIS — M54.16 RADICULOPATHY OF LUMBAR REGION: ICD-10-CM

## 2019-01-03 DIAGNOSIS — I70.209 PERIPHERAL ARTERIOSCLEROSIS (HCC): Primary | ICD-10-CM

## 2019-01-03 DIAGNOSIS — M79.671 PAIN IN BOTH FEET: ICD-10-CM

## 2019-01-03 DIAGNOSIS — M79.672 PAIN IN BOTH FEET: ICD-10-CM

## 2019-01-03 DIAGNOSIS — M77.41 METATARSALGIA OF BOTH FEET: ICD-10-CM

## 2019-01-03 PROCEDURE — 99213 OFFICE O/P EST LOW 20 MIN: CPT | Performed by: PODIATRIST

## 2019-01-03 RX ORDER — OXYCODONE HYDROCHLORIDE AND ACETAMINOPHEN 5; 325 MG/1; MG/1
TABLET ORAL
Qty: 30 TABLET | Refills: 0 | Status: SHIPPED | OUTPATIENT
Start: 2019-01-03 | End: 2019-02-03

## 2019-01-03 RX ORDER — GABAPENTIN 600 MG/1
600 TABLET ORAL 2 TIMES DAILY
Qty: 60 TABLET | Refills: 0 | Status: SHIPPED | OUTPATIENT
Start: 2019-01-03 | End: 2019-01-17 | Stop reason: SDUPTHER

## 2019-01-03 NOTE — PROGRESS NOTES
Assessment/Plan:  Rule out radiculopathy  Chronic edema bilateral lower extremity  Rule out extent of peripheral artery disease  Pain upon ambulation  Plan  Short course of narcotic ordered to help with patient's pain  His original prescription was sent to mail order  He does not have this  Will increase gabapentin dosing  Spine x-rays are to be ordered to rule out radiculopathy  In addition we will rule out extent of peripheral artery disease  Doppler testing is being ordered  Patient has been referred to vascular surgery for edema workup as well  No problem-specific Assessment & Plan notes found for this encounter  There are no diagnoses linked to this encounter  Subjective:  Urgent visit  Patient has increasing pain in his legs  No history of trauma  Patient has low back pain  Past Medical History:   Diagnosis Date    Arthritis     [facility] fl[facility] ([facility] )     Chronic kidney disease     stage 3    Coronary artery disease     2 stents    Fluid retention     Gout     Heart failure (HCC)     pacemaker    Hypertension     Pacemaker     Pulmonary emphysema ([facility] )     Radiculopathy     last assessed 1/28/16     Shortness of breath     exertion    Sleep apnea     c pap       Past Surgical History:   Procedure Laterality Date    ANGIOPLASTY      x2 2 stents and then replaced   [address] [place] [address]      pacemaker permanent placement dual chamber / last assessed 4/7/14 / implantation     CARDIAC SURGERY      pacemaker    CHOLECYSTECTOMY      CORONARY ANGIOPLASTY WITH STENT PLACEMENT      EPIDURAL BLOCK INJECTION N/A 5/26/2016    Procedure: BLOCK / INJECTION EPIDURAL STEROID LUMBAR  L4-5;  Surgeon: Jazmine Klein MD;  Location: [facility] MAIN OR;  Service:     EYE SURGERY      cataract left    KNEE ARTHROSCOPY W/ MENISCAL REPAIR Left     LUMBAR EPIDURAL INJECTION N/A 3/17/2016    Procedure: BLOCK / INJECTION LUMBAR  L4-5  (C-ARM);   Surgeon: Jazmine Klein MD; Location: [facility] MAIN OR;  Service:        No Known Allergies      Current Outpatient Prescriptions:     albuterol (VENTOLIN HFA) 90 mcg/act inhaler, Inhale 2 puffs every 6 (six) hours as needed for wheezing, Disp: 1 Inhaler, Rfl: 6    allopurinol (ZYLOPRIM) 100 mg tablet, Take 1 tablet (100 mg total) by mouth daily, Disp: 90 tablet, Rfl: 3    ascorbic acid (VITAMIN C) 500 mg tablet, Take 500 mg by mouth daily  , Disp: , Rfl:     B Complex Vitamins (B COMPLEX 1 PO), Take 1 tablet by mouth daily  , Disp: , Rfl:     Biotin (BIOTIN 5000) 5 MG CAPS, Take 1,000 mcg by mouth daily  , Disp: , Rfl:     calcium carbonate-vitamin D (OSCAL-D) 500 mg-200 units per tablet, Take 2 tablets by mouth daily at bedtime  , Disp: , Rfl:     ciclopirox (LOPROX) 0 77 % cream, Apply topically 2 (two) times a day for 20 days, Disp: 45 g, Rfl: 2    clopidogrel (PLAVIX) 75 mg tablet, TAKE 1 TABLET DAILY, Disp: 90 tablet, Rfl: 3    collagenase (SANTYL) ointment, Apply 1 application topically daily  , Disp: , Rfl:     Cranberry 1000 MG CAPS, Take 1 tablet by mouth 2 (two) times a day , Disp: , Rfl:     cyanocobalamin 1000 MCG tablet, Take 100 mcg by mouth daily, Disp: , Rfl:     felodipine (PLENDIL) 5 mg 24 hr tablet, TAKE 1 TABLET DAILY, Disp: 90 tablet, Rfl: 3    finasteride (PROSCAR) 5 mg tablet, Take 1 tablet (5 mg total) by mouth daily, Disp: 90 tablet, Rfl: 3    Flaxseed, Linseed, (EQL FLAX SEED OIL) 1000 MG CAPS, Take by mouth daily  , Disp: , Rfl:     fluocinonide (LIDEX) 0 05 % cream, Apply topically 2 (two) times a day for 30 days, Disp: 30 g, Rfl: 2    fluticasone-salmeterol (ADVAIR) 250-50 mcg/dose inhaler, Inhale 1 puff 2 (two) times a day Rinse mouth after use , Disp: 1 Inhaler, Rfl: 6    furosemide (LASIX) 40 mg tablet, Take 40 mg by mouth daily as needed  , Disp: , Rfl:     gabapentin (NEURONTIN) 300 mg capsule, Take 1 capsule (300 mg total) by mouth 3 (three) times a day for 30 days, Disp: 90 capsule, Rfl: 1   gabapentin (NEURONTIN) 300 mg capsule, TAKE 1 CAPSULE (300 MG TOTAL) BY MOUTH 2 (TWO) TIMES A DAY FOR 30 DAYS, Disp: 60 capsule, Rfl: 2    glucosamine-chondroitin 500-400 MG tablet, Take 1 tablet by mouth 2 (two) times a day , Disp: , Rfl:     lactase (LACTAID) 3,000 units tablet, Take 3,000 Units by mouth as needed  , Disp: , Rfl:     Omega-3 Fatty Acids (FISH OIL) 1,000 mg, Take 1,000 mg by mouth 2 (two) times a day , Disp: , Rfl:     PARoxetine (PAXIL) 20 mg tablet, TAKE 1/2 TABLET BY MOUTH DAILY, Disp: 30 tablet, Rfl: 0    psyllium (METAMUCIL) 58 6 % powder, Take 1 packet by mouth daily Indications: 1 tsp , Disp: , Rfl:     rivaroxaban (XARELTO) tablet, Take 20 mg by mouth , Disp: , Rfl:     simvastatin (ZOCOR) 20 mg tablet, Take 1 tablet (20 mg total) by mouth daily at bedtime, Disp: 90 tablet, Rfl: 3    traMADol (ULTRAM) 50 mg tablet, 1 tablet to be taken twice daily as needed for foot and leg pain, Disp: 30 tablet, Rfl: 0    Patient Active Problem List   Diagnosis    Chronic pain disorder    Bilateral lumbar radiculopathy    Lumbar canal stenosis    Acquired ankle/foot deformity    Pain in joints of both feet    Dermatophytosis    Atherosclerosis of arteries of extremities (HCC)    Pain in both feet    Onychomycosis    Neck muscle spasm    Anxiety 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    Benign hypertension with CKD (chronic kidney disease) stage III (HCC)    Bilateral leg edema    CKD (chronic kidney disease), stage III (HCC)    Microscopic hematuria    Proteinuria    Urinary frequency    Atrial fibrillation (HCC)    Benign prostatic hyperplasia    Congestive heart failure (HCC)    Arteriosclerosis of coronary artery    Allergic rhinitis    Aneurysm of abdominal aorta (HCC)    Angina pectoris (HCC)    Pain in joint involving ankle and foot    Atrial flutter (HCC)    Carpal tunnel syndrome    Chronic gout without tophus    Chronic low back pain    Chronic obstructive pulmonary disease ([facility] )    Colon, diverticulosis    Deep venous insufficiency    Degenerative disc disease, lumbar    Difficulty in walking    Fecal soiling    Fibromyalgia    Fistula-in-ano    Hyperlipidemia    Insomnia, persistent    Memory loss    Moderate or severe vision impairment, one eye    Morbid obesity (HCC)    Nicotine dependence    Osteoarthritis of knee    Pacemaker    Sleep apnea    Urinary incontinence    Venous insufficiency (chronic) (peripheral)    BMI 45 0-49 9, adult (HCC)    Carbuncle of neck    Carbuncle of occipital region of scalp    Elevated glucose          Patient ID: Nahed Jj is a 66 y o  male  HPI    The following portions of the patient's history were reviewed and updated as appropriate: allergies, current medications, past family history, past medical history, past social history, past surgical history and problem list     Review of Systems      Objective:  Patient's shoes and socks removed  Foot Exam    General  General Appearance: appears stated age and healthy   Orientation: alert and oriented to person, place, and time   Affect: appropriate   Gait: antalgic       Right Foot/Ankle     Inspection and Palpation  Ecchymosis: none  Tenderness: metatarsals   Swelling: dorsum and metatarsals   Arch: pes planus      Left Foot/Ankle      Inspection and Palpation  Ecchymosis: none  Tenderness: metatarsals   Swelling: dorsum and metatarsals   Arch: pes planus        Physical Exam   Constitutional: He appears well-developed and well-nourished  Cardiovascular: Normal rate and regular rhythm  4/4 pitting edema bilateral   Negative Homans sign bilateral    Neurological:   2/4 Achilles tendon return  Decreased vibratory  Positive sciatic test   All bilateral   4/5 L5 testing  Nursing note and vitals reviewed

## 2019-01-08 DIAGNOSIS — R60.0 BILATERAL LEG EDEMA: Primary | ICD-10-CM

## 2019-01-08 RX ORDER — FUROSEMIDE 40 MG/1
TABLET ORAL
Qty: 30 TABLET | Refills: 0 | Status: SHIPPED | OUTPATIENT
Start: 2019-01-08 | End: 2019-02-04 | Stop reason: SDUPTHER

## 2019-01-09 ENCOUNTER — TRANSCRIBE ORDERS (OUTPATIENT)
Dept: ADMINISTRATIVE | Facility: HOSPITAL | Age: 79
End: 2019-01-09

## 2019-01-09 ENCOUNTER — HOSPITAL ENCOUNTER (OUTPATIENT)
Dept: RADIOLOGY | Facility: HOSPITAL | Age: 79
Discharge: HOME | End: 2019-01-09
Attending: PODIATRIST
Payer: MEDICARE

## 2019-01-09 DIAGNOSIS — M79.672 PAIN IN BOTH FEET: ICD-10-CM

## 2019-01-09 DIAGNOSIS — M54.16 RADICULOPATHY OF LUMBAR REGION: ICD-10-CM

## 2019-01-09 DIAGNOSIS — M79.671 PAIN IN BOTH FEET: ICD-10-CM

## 2019-01-09 DIAGNOSIS — I70.209 PERIPHERAL ARTERIOSCLEROSIS (HCC): ICD-10-CM

## 2019-01-09 PROCEDURE — 72114 X-RAY EXAM L-S SPINE BENDING: CPT

## 2019-01-09 PROCEDURE — 93923 UPR/LXTR ART STDY 3+ LVLS: CPT

## 2019-01-09 PROCEDURE — 93922 UPR/L XTREMITY ART 2 LEVELS: CPT | Performed by: SURGERY

## 2019-01-09 PROCEDURE — 93925 LOWER EXTREMITY STUDY: CPT

## 2019-01-09 PROCEDURE — 93925 LOWER EXTREMITY STUDY: CPT | Performed by: SURGERY

## 2019-01-10 DIAGNOSIS — M54.17 LUMBOSACRAL RADICULOPATHY: Primary | ICD-10-CM

## 2019-01-10 NOTE — PROGRESS NOTES
Patient has continued and chronic pain  He is aware results  Will increase Percocet dosing    He has been referred to pain management

## 2019-01-14 ENCOUNTER — TELEPHONE (OUTPATIENT)
Dept: FAMILY MEDICINE CLINIC | Facility: CLINIC | Age: 79
End: 2019-01-14

## 2019-01-14 DIAGNOSIS — M54.16 LUMBAR RADICULOPATHY: Primary | ICD-10-CM

## 2019-01-14 NOTE — TELEPHONE ENCOUNTER
Wife called looking for patient's results of xray done 1/9  Wife also states patient is in a ton of pain when walking, nothing seems to be helping him, please advise ?  Leonie Cornejo, MA

## 2019-01-14 NOTE — TELEPHONE ENCOUNTER
This x-ray was ordered by Dr Ifrah White!! No fracture Arthritis  Spine consult at [facility] if still pain   Consult placed

## 2019-01-15 ENCOUNTER — TELEPHONE (OUTPATIENT)
Dept: PHYSICAL THERAPY | Facility: OTHER | Age: 79
End: 2019-01-15

## 2019-01-15 ENCOUNTER — NURSE TRIAGE (OUTPATIENT)
Dept: PHYSICAL THERAPY | Facility: OTHER | Age: 79
End: 2019-01-15

## 2019-01-15 DIAGNOSIS — M54.42 ACUTE BILATERAL LOW BACK PAIN WITH BILATERAL SCIATICA: ICD-10-CM

## 2019-01-15 DIAGNOSIS — G89.29 CHRONIC LOW BACK PAIN, UNSPECIFIED BACK PAIN LATERALITY, WITH SCIATICA PRESENCE UNSPECIFIED: Primary | ICD-10-CM

## 2019-01-15 DIAGNOSIS — M54.41 ACUTE BILATERAL LOW BACK PAIN WITH BILATERAL SCIATICA: ICD-10-CM

## 2019-01-15 DIAGNOSIS — M79.605 CHRONIC PAIN OF BOTH LOWER EXTREMITIES: ICD-10-CM

## 2019-01-15 DIAGNOSIS — M54.5 CHRONIC LOW BACK PAIN, UNSPECIFIED BACK PAIN LATERALITY, WITH SCIATICA PRESENCE UNSPECIFIED: Primary | ICD-10-CM

## 2019-01-15 DIAGNOSIS — M79.604 CHRONIC PAIN OF BOTH LOWER EXTREMITIES: ICD-10-CM

## 2019-01-15 DIAGNOSIS — G89.29 CHRONIC PAIN OF BOTH LOWER EXTREMITIES: ICD-10-CM

## 2019-01-15 NOTE — TELEPHONE ENCOUNTER
Patient aware  Already see's dr Mitchell Portillo  Will call dr Jenn Dang office for further advise   No further action is required Boaz Son MA

## 2019-01-15 NOTE — TELEPHONE ENCOUNTER
Reason for Disposition   Is this a chronic condition? Background - Initial Assessment  Clinical complaint: Low back pain and pain in both lower legs between knees and feet  Date of onset: 6 months of leg pain with back pain starting 2 weeks ago; past chronic back pain  Mechanism of injury: unknown onset of injury    Previous Treatment - Previous Treatment  Previous evaluation: Dr Jorge Reed, and PCP  Current provider: Dr Berry Pap: Jeremy Quinn   Previous treatment: Rx for gabapentin 600 mg TID; oxycodone - was not helping and discontinued; discontinued tramadol; Patient saw Dr Martir Ty 2 years ago- reports injections in lower back    Protocols used: [facility] COMPREHENSIVE SPINE PROGRAM PROTOCOL    Acute on Chronic pain  RN provided patient an overview of the [facility] including: triage assessment, physical therapy, and additional specialist referral options based on symptoms and chronicity  RN explained that [facility] program is physical therapy based with the goal of getting the patient scheduled in 24 - 48 hrs  Further explained that we schedule patients for a consultation with one of our advanced spine physical therapists for evaluation which may include treatment  These therapists have their doctorate in PTx  If needed, a telemed visit could occur at the same time of this consultation if muscle relaxers or a higher dose NSAID is needed  The goal is to get patients treated as quickly as possible so they can return to their normal activities  Research has shown great results when starting physical therapy sooner than later which helps reduce imaging and costs to patients  Spoke with both patient and wife  Patient states he has gained 40 pounds over the past 8 months because of his pain, he states he sits in his chair all the time  Patient fell 2 months ago and injured his rotator cuff, patient was evaluated by Dr Desi Boateng   Patient and wife report stage III kidney disease, pacemaker and sleep apnea  Patient retired three years ago with decreased activity  Patient reports pain management with Dr Aelxus Mckinney for past chronic back pain with injections  Discussed with patient and wife options for both physical theray and SPA  Patient agreeable to both

## 2019-01-15 NOTE — TELEPHONE ENCOUNTER
Patient's daughter answered phone as patient was not home  RN provided daughter with purpose of call, and phone number for patient to return call  Daughter stated she will provide information to patient and have him call back

## 2019-01-16 DIAGNOSIS — M54.17 LUMBOSACRAL RADICULOPATHY: Primary | ICD-10-CM

## 2019-01-16 RX ORDER — HYDROXYZINE HYDROCHLORIDE 25 MG/1
25 TABLET, FILM COATED ORAL 3 TIMES DAILY
Qty: 9 TABLET | Refills: 0 | Status: SHIPPED | OUTPATIENT
Start: 2019-01-16 | End: 2019-01-17 | Stop reason: SDUPTHER

## 2019-01-16 RX ORDER — METHYLPREDNISOLONE 4 MG/1
TABLET ORAL
Qty: 21 TABLET | Refills: 0 | Status: SHIPPED | OUTPATIENT
Start: 2019-01-16 | End: 2019-01-17 | Stop reason: SDUPTHER

## 2019-01-17 DIAGNOSIS — M54.17 LUMBOSACRAL RADICULOPATHY: ICD-10-CM

## 2019-01-17 DIAGNOSIS — M54.16 RADICULOPATHY OF LUMBAR REGION: ICD-10-CM

## 2019-01-17 DIAGNOSIS — I70.209 PERIPHERAL ARTERIOSCLEROSIS (HCC): ICD-10-CM

## 2019-01-17 RX ORDER — METHYLPREDNISOLONE 4 MG/1
TABLET ORAL
Qty: 21 TABLET | Refills: 0 | Status: SHIPPED | OUTPATIENT
Start: 2019-01-17 | End: 2019-02-26 | Stop reason: ALTCHOICE

## 2019-01-17 RX ORDER — GABAPENTIN 600 MG/1
600 TABLET ORAL 3 TIMES DAILY
Qty: 60 TABLET | Refills: 0 | Status: SHIPPED | OUTPATIENT
Start: 2019-01-17 | End: 2019-01-30 | Stop reason: SDUPTHER

## 2019-01-17 RX ORDER — GABAPENTIN 300 MG/1
300 CAPSULE ORAL 3 TIMES DAILY
Qty: 90 CAPSULE | Refills: 0 | Status: SHIPPED | OUTPATIENT
Start: 2019-01-17 | End: 2019-02-26 | Stop reason: SDUPTHER

## 2019-01-17 RX ORDER — HYDROXYZINE HYDROCHLORIDE 25 MG/1
25 TABLET, FILM COATED ORAL 3 TIMES DAILY
Qty: 9 TABLET | Refills: 0 | Status: SHIPPED | OUTPATIENT
Start: 2019-01-17 | End: 2019-04-08

## 2019-01-21 ENCOUNTER — OFFICE VISIT (OUTPATIENT)
Dept: PODIATRY | Facility: CLINIC | Age: 79
End: 2019-01-21
Payer: MEDICARE

## 2019-01-21 ENCOUNTER — EVALUATION (OUTPATIENT)
Dept: PHYSICAL THERAPY | Facility: CLINIC | Age: 79
End: 2019-01-21
Payer: MEDICARE

## 2019-01-21 VITALS
WEIGHT: 315 LBS | SYSTOLIC BLOOD PRESSURE: 139 MMHG | HEART RATE: 71 BPM | BODY MASS INDEX: 41.75 KG/M2 | HEIGHT: 73 IN | RESPIRATION RATE: 17 BRPM | DIASTOLIC BLOOD PRESSURE: 82 MMHG

## 2019-01-21 VITALS — DIASTOLIC BLOOD PRESSURE: 82 MMHG | SYSTOLIC BLOOD PRESSURE: 139 MMHG | HEART RATE: 71 BPM | TEMPERATURE: 97.9 F

## 2019-01-21 DIAGNOSIS — M25.571 PAIN IN JOINTS OF BOTH FEET: Primary | ICD-10-CM

## 2019-01-21 DIAGNOSIS — M77.42 METATARSALGIA OF BOTH FEET: ICD-10-CM

## 2019-01-21 DIAGNOSIS — M54.16 RADICULOPATHY OF LUMBAR REGION: ICD-10-CM

## 2019-01-21 DIAGNOSIS — M54.41 ACUTE BILATERAL LOW BACK PAIN WITH BILATERAL SCIATICA: ICD-10-CM

## 2019-01-21 DIAGNOSIS — I70.209 ATHEROSCLEROSIS OF ARTERIES OF EXTREMITIES (HCC): ICD-10-CM

## 2019-01-21 DIAGNOSIS — M77.41 METATARSALGIA OF BOTH FEET: ICD-10-CM

## 2019-01-21 DIAGNOSIS — M54.42 ACUTE BILATERAL LOW BACK PAIN WITH BILATERAL SCIATICA: ICD-10-CM

## 2019-01-21 DIAGNOSIS — M25.572 PAIN IN JOINTS OF BOTH FEET: Primary | ICD-10-CM

## 2019-01-21 PROCEDURE — 97162 PT EVAL MOD COMPLEX 30 MIN: CPT | Performed by: PHYSICAL THERAPIST

## 2019-01-21 PROCEDURE — 97110 THERAPEUTIC EXERCISES: CPT | Performed by: PHYSICAL THERAPIST

## 2019-01-21 PROCEDURE — 99213 OFFICE O/P EST LOW 20 MIN: CPT | Performed by: PODIATRIST

## 2019-01-21 NOTE — PROGRESS NOTES
PT Evaluation     Today's date: 2019  Patient name: Isiah Tyler  : 1940  MRN: [id number]  Referring provider: Perla Infante MD  Dx:   Encounter Diagnosis     ICD-10-CM    1  Acute bilateral low back pain with bilateral sciatica M54 42 Ambulatory referral to PT spine    M54 41        Start Time: 0800  Stop Time: 0845  Total time in clinic (min): 45 minutes    Assessment  Assessment details: Isiah Tyler is a 66 y o  male who presents with pain, decreased strength, decreased ROM, ambulatory dysfunction and postural  dysfunction  Due to these impairments and significant obesity, patient has difficulty performing ADL's, recreational activities, ambulation, stair negotiation, lifting/carrying, transfers  Patient's clinical presentation is consistent with their referring diagnosis of Acute bilateral low back pain with bilateral sciatica  Pt presents with Start Back score of High risk  Patient has been educated in home exercise program and plan of care   Patient would benefit from skilled physical therapy services with direction specific exercise and stabilization exercises  to address their aforementioned functional limitations and progress towards prior level of function and independence with home exercise program      Impairments: abnormal gait, abnormal or restricted ROM, activity intolerance, impaired physical strength, lacks appropriate home exercise program, pain with function, poor posture  and poor body mechanics  Understanding of Dx/Px/POC: good   Prognosis: good    Goals  Short term goals to be accomplished in 3 weeks:  STG 1: Pt will demo independence with postural management  STG 2: Pt will demo I with HEP to maximize progress between therapy sessions  STG 3: Pt will demo L/S AROM < or = min loss throughout to promote improved functional mobility and body mechanics  STG 4: Pt will demo 1/2 MMT grade core stabilizers to improve lumbar stability with functional challenges  STG 5: Pt will reports pain dec freq and intensity 50%    Long term goals to be accomplished in 6 weeks:  LTG 1: Pt will demo good body mech with >75% functional challenges to prevent reinjury  LTG 2: Pt will be able to return to walking in community pain free as per PLOF  LTG 3: Pt will demo Good strength core stabilizers to promote carryover with body mech and posture    Plan  Plan details:  HEP development, stretching, strengthening, A/AA/PROM, joint mobilizations, posture education, STM/MI as needed to reduce muscle tension, muscle reeducation, PLOC discussed and agreed upon with patient  Patient would benefit from: PT eval and skilled physical therapy  Planned modality interventions: cryotherapy and thermotherapy: hydrocollator packs  Planned therapy interventions: manual therapy, neuromuscular re-education, self care, therapeutic activities, therapeutic exercise and home exercise program  Frequency: 2x week  Duration in weeks: 6  Treatment plan discussed with: patient        Subjective Evaluation    History of Present Illness  Mechanism of injury: Pt reports onset of LBP and LE pain which is different from his long standing chronic LBP  Pt reports he feels his back pain is due to his LE pain which is caused by"walking wrong " Pt previously had been very active with work, now is not  Pt struggles to function in community due to fatigue and pain  Pt currently (+) gout however he reports "that's old " Pt feels his LBP is noticed when he first stands from sitting however abates while he is ambulating  Pt also reports he holds his breath while walking, causing SOB  Pt reports his strong pain is short in duration  Denies modality use  Pt cannot determine causative incident  Pt reports he sits for extended time frame during his day and finds he slouches frequently, finds he is often rearranging his sitting support surfaces   Pt feels his breathlessness and leg pain are hugely limiting and he has gained significant weight form his change in capability to move  Pain  Current pain ratin  At best pain ratin  At worst pain rating: 10    Treatments  Previous treatment: physical therapy  Patient Goals  Patient goals for therapy: increased strength, independence with ADLs/IADLs, increased motion and decreased pain          Objective     Special Questions  Negative for night pain, disturbed sleep, bladder dysfunction, bowel dysfunction and saddle (S4) numbness    Postural Observations  Seated posture: poor  Standing posture: poor  Correction of posture: makes symptoms better        Neurological Testing     Additional Neurological Details  (-) sensory or reflexive deficits t/o    Active Range of Motion     Additional Active Range of Motion Details  Lumbar AROM:    Flexion: Min loss painful  Extension: mod loss painfree  R SGIS: Min loss  L SGIS: min loss    Strength/Myotome Testing     Additional Strength Details  TrvA/paraspinals: Unable to test due to difficulty in performing activity and limited ability to transition to mat  B LE strength Fair t/o    Tests     Additional Tests Details  DEANNA: Dec/B    General Comments     Lumbar Comments  Function:    Gait: WBOS, major inc trunk sway, dec toe clearance from floor, major SOB, requested seated resting break after 100' to "catch breath " B LE pain felt in calves, reportedly  Stairs: Step to pattern  STS: Painful      Flowsheet Rows      Most Recent Value   PT/OT G-Codes   Current Score  41   Projected Score  52   FOTO information reviewed  Yes          Precautions: Standard  SOB       Daily Treatment Diary     Manual                                                                                   Exercise Diary  1            Posture Edu 10'            DEANNA 5x                                                                                                                                                                                                                            HEP Edu/initated            Time 10'                Modalities

## 2019-01-21 NOTE — PROGRESS NOTES
Assessment/Plan:  Pain upon ambulation  Limb pain  Rule out radiculopathy  Edema secondary to venous insufficiency  Rule out degenerative disc disease  Probable spinal stenosis  Plan  Attempt pain control  Patient is taking gabapentin 600 mg t i d  With some minimal relief  He has breakthrough pain with Percocet  He was unable to take Medrol Dosepak as directed  Follow up with pain management  No problem-specific Assessment & Plan notes found for this encounter  There are no diagnoses linked to this encounter  Subjective:   Patient has continued ongoing pain in his legs  Pain is not relieved with Percocet  It is only minimally helped with gabapentin 600 mg t i d   Patient walks with a limp  He has pain upon rising  No history of occult trauma  Past Medical History:   Diagnosis Date    Arthritis     Atrial flutter ([facility] [address] )     Chronic kidney disease     stage 3    Coronary artery disease     2 stents    Fluid retention     Gout     Heart failure (HCC)     pacemaker    Hypertension     Pacemaker     Pulmonary emphysema ([facility] [address] [facility]     Radiculopathy     last assessed 1/28/16     Shortness of breath     exertion    Sleep apnea     c pap       Past Surgical History:   Procedure Laterality Date    ANGIOPLASTY      x2 2 stents and then replaced   [address]      pacemaker permanent placement dual chamber / last assessed 4/7/14 / implantation     CARDIAC SURGERY      pacemaker    CHOLECYSTECTOMY      CORONARY ANGIOPLASTY WITH STENT PLACEMENT      EPIDURAL BLOCK INJECTION N/A 5/26/2016    Procedure: BLOCK / INJECTION EPIDURAL STEROID LUMBAR  L4-5;  Surgeon: Harleen Camarillo MD;  Location: [facility] MAIN OR;  Service:     EYE SURGERY      cataract left    KNEE ARTHROSCOPY W/ MENISCAL REPAIR Left     LUMBAR EPIDURAL INJECTION N/A 3/17/2016    Procedure: BLOCK / INJECTION LUMBAR  L4-5  (C-ARM);   Surgeon: Harleen Camarillo MD;  Location: [facility] MAIN OR;  Service:        No Known Allergies      Current Outpatient Prescriptions:     albuterol (VENTOLIN HFA) 90 mcg/act inhaler, Inhale 2 puffs every 6 (six) hours as needed for wheezing, Disp: 1 Inhaler, Rfl: 6    allopurinol (ZYLOPRIM) 100 mg tablet, Take 1 tablet (100 mg total) by mouth daily, Disp: 90 tablet, Rfl: 3    ascorbic acid (VITAMIN C) 500 mg tablet, Take 500 mg by mouth daily  , Disp: , Rfl:     B Complex Vitamins (B COMPLEX 1 PO), Take 1 tablet by mouth daily  , Disp: , Rfl:     Biotin (BIOTIN 5000) 5 MG CAPS, Take 1,000 mcg by mouth daily  , Disp: , Rfl:     calcium carbonate-vitamin D (OSCAL-D) 500 mg-200 units per tablet, Take 2 tablets by mouth daily at bedtime  , Disp: , Rfl:     ciclopirox (LOPROX) 0 77 % cream, Apply topically 2 (two) times a day for 20 days, Disp: 45 g, Rfl: 2    clopidogrel (PLAVIX) 75 mg tablet, TAKE 1 TABLET DAILY, Disp: 90 tablet, Rfl: 3    collagenase (SANTYL) ointment, Apply 1 application topically daily  , Disp: , Rfl:     Cranberry 1000 MG CAPS, Take 1 tablet by mouth 2 (two) times a day , Disp: , Rfl:     cyanocobalamin 1000 MCG tablet, Take 100 mcg by mouth daily, Disp: , Rfl:     felodipine (PLENDIL) 5 mg 24 hr tablet, TAKE 1 TABLET DAILY, Disp: 90 tablet, Rfl: 3    finasteride (PROSCAR) 5 mg tablet, Take 1 tablet (5 mg total) by mouth daily, Disp: 90 tablet, Rfl: 3    Flaxseed, Linseed, (EQL FLAX SEED OIL) 1000 MG CAPS, Take by mouth daily  , Disp: , Rfl:     fluocinonide (LIDEX) 0 05 % cream, Apply topically 2 (two) times a day for 30 days, Disp: 30 g, Rfl: 2    fluticasone-salmeterol (ADVAIR) 250-50 mcg/dose inhaler, Inhale 1 puff 2 (two) times a day Rinse mouth after use , Disp: 1 Inhaler, Rfl: 6    furosemide (LASIX) 40 mg tablet, TAKE 1 TABLET BY MOUTH DAILY AS NEEDED FOR LEG EDEMA OR DYSNEA, Disp: 30 tablet, Rfl: 0    gabapentin (NEURONTIN) 300 mg capsule, TAKE 1 CAPSULE (300 MG TOTAL) BY MOUTH 2 (TWO) TIMES A DAY FOR 30 DAYS, Disp: 60 capsule, Rfl: 2    gabapentin (NEURONTIN) 300 mg capsule, Take 1 capsule (300 mg total) by mouth 3 (three) times a day for 30 days, Disp: 90 capsule, Rfl: 0    gabapentin (NEURONTIN) 600 MG tablet, Take 1 tablet (600 mg total) by mouth 3 (three) times a day for 30 days, Disp: 60 tablet, Rfl: 0    glucosamine-chondroitin 500-400 MG tablet, Take 1 tablet by mouth 2 (two) times a day , Disp: , Rfl:     hydrOXYzine HCL (ATARAX) 25 mg tablet, Take 1 tablet (25 mg total) by mouth 3 (three) times a day for 3 days, Disp: 9 tablet, Rfl: 0    lactase (LACTAID) 3,000 units tablet, Take 3,000 Units by mouth as needed  , Disp: , Rfl:     methylPREDNISolone 4 MG tablet therapy pack, Use as directed on package, Disp: 21 tablet, Rfl: 0    Omega-3 Fatty Acids (FISH OIL) 1,000 mg, Take 1,000 mg by mouth 2 (two) times a day , Disp: , Rfl:     oxyCODONE-acetaminophen (PERCOCET) 5-325 mg per tablet, 1 tablet to be taken 3 times daily as needed for leg pain, Disp: 30 tablet, Rfl: 0    PARoxetine (PAXIL) 20 mg tablet, TAKE 1/2 TABLET BY MOUTH DAILY, Disp: 30 tablet, Rfl: 0    psyllium (METAMUCIL) 58 6 % powder, Take 1 packet by mouth daily Indications: 1 tsp , Disp: , Rfl:     rivaroxaban (XARELTO) tablet, Take 20 mg by mouth , Disp: , Rfl:     simvastatin (ZOCOR) 20 mg tablet, Take 1 tablet (20 mg total) by mouth daily at bedtime, Disp: 90 tablet, Rfl: 3    traMADol (ULTRAM) 50 mg tablet, 1 tablet to be taken twice daily as needed for foot and leg pain, Disp: 30 tablet, Rfl: 0    Patient Active Problem List   Diagnosis    Chronic pain disorder    Bilateral lumbar radiculopathy    Lumbar canal stenosis    Acquired ankle/foot deformity    Pain in joints of both feet    Dermatophytosis    Atherosclerosis of arteries of extremities (HCC)    Pain in both feet    Onychomycosis    Neck muscle spasm    Anxiety 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    Benign hypertension with CKD (chronic kidney disease) stage III (HCC)    Bilateral leg edema    CKD (chronic kidney disease), stage III (HCC)    Microscopic hematuria    Proteinuria    Urinary frequency    Atrial fibrillation (HCC)    Benign prostatic hyperplasia    Congestive heart failure (HCC)    Arteriosclerosis of coronary artery    Allergic rhinitis    Aneurysm of abdominal aorta (HCC)    Angina pectoris (HCC)    Pain in joint involving ankle and foot    Atrial flutter (HCC)    Carpal tunnel syndrome    Chronic gout without tophus    Chronic low back pain    Chronic obstructive pulmonary disease (HCC)    Colon, diverticulosis    Deep venous insufficiency    Degenerative disc disease, lumbar    Difficulty in walking    Fecal soiling    Fibromyalgia    Fistula-in-ano    Hyperlipidemia    Insomnia, persistent    Memory loss    Moderate or severe vision impairment, one eye    Morbid obesity (HCC)    Nicotine dependence    Osteoarthritis of knee    Pacemaker    Sleep apnea    Urinary incontinence    Venous insufficiency (chronic) (peripheral)    BMI 45 0-49 9, adult (HCC)    Carbuncle of neck    Carbuncle of occipital region of scalp    Elevated glucose    Peripheral arteriosclerosis (HCC)    Radiculopathy of lumbar region    Metatarsalgia of both feet          Patient ID: Jean Cantu is a 66 y o  male  HPI    The following portions of the patient's history were reviewed and updated as appropriate: allergies, current medications, past family history, past medical history, past social history, past surgical history and problem list     Review of Systems      Objective:  Patient's shoes and socks removed     Foot ExamPhysical Exam

## 2019-01-22 ENCOUNTER — TELEPHONE (OUTPATIENT)
Dept: NEPHROLOGY | Facility: CLINIC | Age: 79
End: 2019-01-22

## 2019-01-22 ENCOUNTER — OFFICE VISIT (OUTPATIENT)
Dept: PAIN MEDICINE | Facility: CLINIC | Age: 79
End: 2019-01-22
Payer: MEDICARE

## 2019-01-22 VITALS
DIASTOLIC BLOOD PRESSURE: 66 MMHG | RESPIRATION RATE: 14 BRPM | BODY MASS INDEX: 41.75 KG/M2 | HEART RATE: 71 BPM | SYSTOLIC BLOOD PRESSURE: 138 MMHG | HEIGHT: 73 IN | WEIGHT: 315 LBS

## 2019-01-22 DIAGNOSIS — G89.29 CHRONIC BILATERAL LOW BACK PAIN WITH BILATERAL SCIATICA: ICD-10-CM

## 2019-01-22 DIAGNOSIS — M54.42 CHRONIC BILATERAL LOW BACK PAIN WITH BILATERAL SCIATICA: ICD-10-CM

## 2019-01-22 DIAGNOSIS — G89.4 CHRONIC PAIN SYNDROME: Primary | ICD-10-CM

## 2019-01-22 DIAGNOSIS — M54.16 LUMBAR RADICULOPATHY: ICD-10-CM

## 2019-01-22 DIAGNOSIS — M54.41 CHRONIC BILATERAL LOW BACK PAIN WITH BILATERAL SCIATICA: ICD-10-CM

## 2019-01-22 PROCEDURE — 99214 OFFICE O/P EST MOD 30 MIN: CPT | Performed by: ANESTHESIOLOGY

## 2019-01-22 NOTE — H&P (VIEW-ONLY)
Pain Medicine Follow-Up Note    Assessment:  1  Chronic pain syndrome    2  Chronic bilateral low back pain with bilateral sciatica    3  Lumbar radiculopathy        Plan:  Orders Placed This Encounter   Procedures    CT lumbar spine without contrast     Standing Status:   Future     Standing Expiration Date:   1/22/2023     Scheduling Instructions: There is no prep for this study  Please bring your insurance cards, a form of photo ID and a list of your medications with you  Arrive 15 minutes prior to your appointment time to register  On the day of your test, please bring any prior CT or MRI studies of this area with you that were not performed at a [facility]  To schedule this appointment, please contact Central Scheduling at [phone]  Order Specific Question:   What is the patient's sedation requirement? Answer:   Unknown     My impressions and treatment recommendations were discussed in detail with the patient who verbalized understanding and had no further questions  The patient is reporting significant pain in his low back and bilateral lower extremities  He had undergone epidural steroid injections several years ago with excellent clinical benefit  I have not seen him since 2016  His last imaging study for his low back was performed in 2013  As such, I felt a reasonable to have the patient undergo a CT of the lumbar spine to evaluate for any pathology that may be contributing to his pain complaints  I also asked the patient to speak to the nephrologist regarding the possibility of increasing his gabapentin to gabapentin 900 mg 3 times daily  The patient's wife stated that she would call his nephrologist today to find out if that was okay  Once the patient undergoes the CT of the lumbar spine, I will discuss further treatment options  Discharge instructions were provided    I personally saw and examined the patient and I agree with the above discussed plan of care  History of Present Illness:    Jayce Stephen is a 66 y o  male who presents to [facility] for interval re-evaluation of the above stated pain complaints  The patient has a past medical and chronic pain history as outlined in the assessment section  He was last seen on October 10, 2016  At today's office visit, the patient's pain score is 8/10 on the verbal numerical pain rating scale  The patient states that his pain is primarily in the low back with radiation into the bilateral lower extremities  He states that his pain is constant in nature and he reports the quality of his pain as pressure-like and pins and needles    He reports excellent pain relief with epidural steroid injections in the past   He has not undergone epidural steroid injections for several years  He last had epidural steroid injections in 2016 by myself  Other than as stated above, the patient denies any interval changes in medications, medical condition, mental condition, symptoms, or allergies since the last office visit  Review of Systems:    Review of Systems   Respiratory: Negative for shortness of breath  Cardiovascular: Negative for chest pain  Gastrointestinal: Negative for constipation, diarrhea, nausea and vomiting  Musculoskeletal: Negative for arthralgias, gait problem, joint swelling and myalgias  Skin: Negative for rash  Neurological: Negative for dizziness, seizures and weakness  All other systems reviewed and are negative          Patient Active Problem List   Diagnosis    Chronic pain syndrome    Bilateral lumbar radiculopathy    Lumbar canal stenosis    Acquired ankle/foot deformity    Pain in joints of both feet    Dermatophytosis    Atherosclerosis of arteries of extremities (HCC)    Pain in both feet    Onychomycosis    Neck muscle spasm    Anxiety 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    Benign hypertension with CKD (chronic kidney disease) stage III ([facility] [address] )    Bilateral leg edema    CKD (chronic kidney disease), stage III (HCC)    Microscopic hematuria    Proteinuria    Urinary frequency    Atrial fibrillation (HCC)    Benign prostatic hyperplasia    Congestive heart failure (HCC)    Arteriosclerosis of coronary artery    Allergic rhinitis    Aneurysm of abdominal aorta (HCC)    Angina pectoris (HCC)    Pain in joint involving ankle and foot    Atrial flutter (HCC)    Carpal tunnel syndrome    Chronic gout without tophus    Chronic low back pain    Chronic obstructive pulmonary disease (HCC)    Colon, diverticulosis    Deep venous insufficiency    Degenerative disc disease, lumbar    Difficulty in walking    Fecal soiling    Fibromyalgia    Fistula-in-ano    Hyperlipidemia    Insomnia, persistent    Memory loss    Moderate or severe vision impairment, one eye    Morbid obesity (HCC)    Nicotine dependence    Osteoarthritis of knee    Pacemaker    Sleep apnea    Urinary incontinence    Venous insufficiency (chronic) (peripheral)    BMI 45 0-49 9, adult (HCC)    Carbuncle of neck    Carbuncle of occipital region of scalp    Elevated glucose    Peripheral arteriosclerosis (HCC)    Lumbar radiculopathy    Metatarsalgia of both feet       Past Medical History:   Diagnosis Date    Arthritis     Atrial flutter (HCC)     Chronic kidney disease     stage 3    Coronary artery disease     2 stents    Fluid retention     Gout     Heart failure (HCC)     pacemaker    Hypertension     Pacemaker     Pulmonary emphysema ([facility] [address] )     Radiculopathy     last assessed 1/28/16     Shortness of breath     exertion    Sleep apnea     c pap       Past Surgical History:   Procedure Laterality Date    ANGIOPLASTY      x2 2 stents and then replaced    CARDIAC PACEMAKER PLACEMENT      pacemaker permanent placement dual chamber / last assessed 4/7/14 / implantation    [facility] CARDIAC SURGERY      pacemaker    CHOLECYSTECTOMY      CORONARY ANGIOPLASTY WITH STENT PLACEMENT      EPIDURAL BLOCK INJECTION N/A 5/26/2016    Procedure: BLOCK / INJECTION EPIDURAL STEROID LUMBAR  L4-5;  Surgeon: Darek Chavarria MD;  [facility];  Service:     EYE SURGERY      cataract left    KNEE ARTHROSCOPY W/ MENISCAL REPAIR Left     LUMBAR EPIDURAL INJECTION N/A 3/17/2016    Procedure: BLOCK / INJECTION LUMBAR  L4-5  (C-ARM); Surgeon: Darek Chavarria MD;  Location: [facility] MAIN OR;  Service:        Family History   Problem Relation Age of Onset    Cancer Mother 80    Heart disease Mother     Hypertension Mother     Heart disease Father     Heart disease Brother     Diabetes Neg Hx     Stroke Neg Hx        Social History     Occupational History    RETIRED      Social History Main Topics    Smoking status: Current Some Day Smoker     Packs/day: 1 00     Years: 50 00     Types: Cigarettes    Smokeless tobacco: Never Used      Comment: reports occasional cigar or cigarette on weekend    Alcohol use No    Drug use: No    Sexual activity: Not on file         Current Outpatient Prescriptions:     albuterol (VENTOLIN HFA) 90 mcg/act inhaler, Inhale 2 puffs every 6 (six) hours as needed for wheezing, Disp: 1 Inhaler, Rfl: 6    allopurinol (ZYLOPRIM) 100 mg tablet, Take 1 tablet (100 mg total) by mouth daily, Disp: 90 tablet, Rfl: 3    ascorbic acid (VITAMIN C) 500 mg tablet, Take 500 mg by mouth daily  , Disp: , Rfl:     B Complex Vitamins (B COMPLEX 1 PO), Take 1 tablet by mouth daily  , Disp: , Rfl:     Biotin (BIOTIN 5000) 5 MG CAPS, Take 1,000 mcg by mouth daily  , Disp: , Rfl:     calcium carbonate-vitamin D (OSCAL-D) 500 mg-200 units per tablet, Take 2 tablets by mouth daily at bedtime  , Disp: , Rfl:     clopidogrel (PLAVIX) 75 mg tablet, TAKE 1 TABLET DAILY, Disp: 90 tablet, Rfl: 3    collagenase (SANTYL) ointment, Apply 1 application topically daily  , Disp: , Rfl:     Cranberry 1000 MG CAPS, Take 1 tablet by mouth 2 (two) times a day , Disp: , Rfl:     cyanocobalamin 1000 MCG tablet, Take 100 mcg by mouth daily, Disp: , Rfl:     felodipine (PLENDIL) 5 mg 24 hr tablet, TAKE 1 TABLET DAILY, Disp: 90 tablet, Rfl: 3    finasteride (PROSCAR) 5 mg tablet, Take 1 tablet (5 mg total) by mouth daily, Disp: 90 tablet, Rfl: 3    Flaxseed, Linseed, (EQL FLAX SEED OIL) 1000 MG CAPS, Take by mouth daily  , Disp: , Rfl:     fluticasone-salmeterol (ADVAIR) 250-50 mcg/dose inhaler, Inhale 1 puff 2 (two) times a day Rinse mouth after use , Disp: 1 Inhaler, Rfl: 6    furosemide (LASIX) 40 mg tablet, TAKE 1 TABLET BY MOUTH DAILY AS NEEDED FOR LEG EDEMA OR DYSNEA, Disp: 30 tablet, Rfl: 0    gabapentin (NEURONTIN) 300 mg capsule, Take 1 capsule (300 mg total) by mouth 3 (three) times a day for 30 days, Disp: 90 capsule, Rfl: 0    gabapentin (NEURONTIN) 600 MG tablet, Take 1 tablet (600 mg total) by mouth 3 (three) times a day for 30 days, Disp: 60 tablet, Rfl: 0    glucosamine-chondroitin 500-400 MG tablet, Take 1 tablet by mouth 2 (two) times a day , Disp: , Rfl:     lactase (LACTAID) 3,000 units tablet, Take 3,000 Units by mouth as needed  , Disp: , Rfl:     methylPREDNISolone 4 MG tablet therapy pack, Use as directed on package, Disp: 21 tablet, Rfl: 0    Omega-3 Fatty Acids (FISH OIL) 1,000 mg, Take 1,000 mg by mouth 2 (two) times a day , Disp: , Rfl:     oxyCODONE-acetaminophen (PERCOCET) 5-325 mg per tablet, 1 tablet to be taken 3 times daily as needed for leg pain, Disp: 30 tablet, Rfl: 0    PARoxetine (PAXIL) 20 mg tablet, TAKE 1/2 TABLET BY MOUTH DAILY, Disp: 30 tablet, Rfl: 0    psyllium (METAMUCIL) 58 6 % powder, Take 1 packet by mouth daily Indications: 1 tsp , Disp: , Rfl:     rivaroxaban (XARELTO) tablet, Take 20 mg by mouth , Disp: , Rfl:     simvastatin (ZOCOR) 20 mg tablet, Take 1 tablet (20 mg total) by mouth daily at bedtime, Disp: 90 tablet, Rfl: 3    ciclopirox (LOPROX) 0 77 % cream, Apply topically 2 (two) times a day for 20 days, Disp: 45 g, Rfl: 2    fluocinonide (LIDEX) 0 05 % cream, Apply topically 2 (two) times a day for 30 days, Disp: 30 g, Rfl: 2    gabapentin (NEURONTIN) 300 mg capsule, TAKE 1 CAPSULE (300 MG TOTAL) BY MOUTH 2 (TWO) TIMES A DAY FOR 30 DAYS, Disp: 60 capsule, Rfl: 2    hydrOXYzine HCL (ATARAX) 25 mg tablet, Take 1 tablet (25 mg total) by mouth 3 (three) times a day for 3 days, Disp: 9 tablet, Rfl: 0    traMADol (ULTRAM) 50 mg tablet, 1 tablet to be taken twice daily as needed for foot and leg pain (Patient not taking: Reported on 1/22/2019 ), Disp: 30 tablet, Rfl: 0    No Known Allergies    Physical Exam:    /66 (BP Location: Right arm, Patient Position: Sitting, Cuff Size: Standard)   Pulse 71   Resp 14   Ht 6' 1" (1 854 m)   Wt (!) 175 kg (384 lb 12 8 oz)   BMI 50 77 kg/m²     Constitutional:obese  Eyes:anicteric  HEENT:grossly intact  Neck:supple, symmetric, trachea midline and no masses   Pulmonary:even and unlabored  Cardiovascular:No edema or pitting edema present  Skin:Normal without rashes or lesions and well hydrated  Psychiatric:Mood and affect appropriate  Neurologic:Cranial Nerves II-XII grossly intact  Musculoskeletal:antalgic and ambulates with cane      Imaging  CT Scan  CT Scan  2/5/13 Lumbar  RESULT: Multidetector CT of the lumbar spine, without contrast, 2/5/2013,   for chronic low back pain  Axial and high-resolution 2D reformatted coronal, sagittal images were   acquired and reviewed with both osseous and soft tissue algorithms obtained  A mild scoliosis of the proximal lumbar spine is identified, convex to the   left  On the sagittal reformatted images, there is maintenance of the   lordosis without acute fracture or traumatic spondylolisthesis to S1  There   is, however, severe L1-L2 degenerative disc disease with disc space   obliteration, a vacuum disc phenomenon indicative of severe disc   degeneration, and endplate sclerosis   Osteophytosis at this level is also   noted as is mild retrolisthesis of the L1 vertebral body on L2, degenerative   in etiology  The remainder of the disc spaces are maintained in height  Incidentally noted is a 3 2 cm aneurysm of the infrarenal abdominal aorta   above the iliac bifurcation with calcified plaque scattered throughout the   abdominal aorta and iliac vessels  Axial imaging demonstrates no definite central protrusion at L1-L2 with   minimal vertebral body spondylosis impinging upon the thecal sac  The neural   foramina, however, are patent  There is no significant facet hypertrophy at   this level  At L2-L3, an annular disc bulge is suspected evoking bilateral recess   stenosis  The neural foramina are patent and there is no significant facet   arthropathy  At L3-L4, again, an annular disc bulge in conjunction with mild facet   hypertrophy evokes bilateral recess stenosis  The central canal is patent as   are the neural foramina  At L4-L5, there is moderate central and bilateral recess stenosis secondary   to severe facet hypertrophy  No definite disc protrusion is seen at this   level  At L5-S1, there is no focal disc protrusion nor is there definite central or   lateral stenosis  Asymmetric facet hypertrophy is present, left greater than   right  IMPRESSION: 1  Scoliosis with severe L1-L2 degenerative disc disease where   there is mild retrolisthesis and minimal central stenosis  2  Multilevel facet arthropathy most severe at L4-L5 with moderate central   and bilateral recess stenosis  CT lumbar spine without contrast    (Results Pending)     Show images for XR spine lumbar complete w bending minimum 6 views   Order Report      Order Details   Order Questions     Question Answer Comment   Exam reason Leg pain    Note:  Enter reason for exam          Reason For Exam     Leg pain   Dx: Radiculopathy of lumbar region [M54 16 (ICD-10-CM)];  Pain in both feet [M79 671, M79 672 (ICD-10-CM)]   Study Result     LUMBAR SPINE     INDICATION:   M54 16: Radiculopathy, lumbar region  M79 671: Pain in right foot  M79 672: Pain in left foot    Chronic low back pain      COMPARISON:  Lumbar spine CT from 2/5/2013      VIEWS:  XR SPINE LUMBAR COMPLETE W BENDING MINIMUM 6 VIEWS        FINDINGS:     There is no evidence of acute fracture or destructive osseous lesion      No evidence of instability in flexion and extension      Severe degenerative disc space narrowing at L1-L2      Mild degenerative disc disease throughout the lumbar spine      No significant lumbar degenerative change noted      The pedicles appear intact      Small abdominal aortic aneurysm again noted      IMPRESSION:     No acute osseous abnormality        Degenerative changes as described      Small abdominal aortic aneurysm again noted         Workstation performed: [id number]      Imaging     XR spine lumbar complete w bending minimum 6 views (Order #[id number]) on 1/9/2019 - Imaging Information   Result History     XR spine lumbar complete w bending minimum 6 views (Order #[id number]) on 1/13/2019 - Order Result History Report           Orders Placed This Encounter   Procedures    CT lumbar spine without contrast

## 2019-01-22 NOTE — PROGRESS NOTES
Pain Medicine Follow-Up Note    Assessment:  1  Chronic pain syndrome    2  Chronic bilateral low back pain with bilateral sciatica    3  Lumbar radiculopathy        Plan:  Orders Placed This Encounter   Procedures    CT lumbar spine without contrast     Standing Status:   Future     Standing Expiration Date:   1/22/2023     Scheduling Instructions: There is no prep for this study  Please bring your insurance cards, a form of photo ID and a list of your medications with you  Arrive 15 minutes prior to your appointment time to register  On the day of your test, please bring any prior CT or MRI studies of this area with you that were not performed at a [facility]  To schedule this appointment, please contact Central Scheduling at [phone]  Order Specific Question:   What is the patient's sedation requirement? Answer:   Unknown     My impressions and treatment recommendations were discussed in detail with the patient who verbalized understanding and had no further questions  The patient is reporting significant pain in his low back and bilateral lower extremities  He had undergone epidural steroid injections several years ago with excellent clinical benefit  I have not seen him since 2016  His last imaging study for his low back was performed in 2013  As such, I felt a reasonable to have the patient undergo a CT of the lumbar spine to evaluate for any pathology that may be contributing to his pain complaints  I also asked the patient to speak to the nephrologist regarding the possibility of increasing his gabapentin to gabapentin 900 mg 3 times daily  The patient's wife stated that she would call his nephrologist today to find out if that was okay  Once the patient undergoes the CT of the lumbar spine, I will discuss further treatment options  Discharge instructions were provided    I personally saw and examined the patient and I agree with the above discussed plan of care  History of Present Illness:    Daniela Spence is a 66 y o  male who presents to [facility] for interval re-evaluation of the above stated pain complaints  The patient has a past medical and chronic pain history as outlined in the assessment section  He was last seen on October 10, 2016  At today's office visit, the patient's pain score is 8/10 on the verbal numerical pain rating scale  The patient states that his pain is primarily in the low back with radiation into the bilateral lower extremities  He states that his pain is constant in nature and he reports the quality of his pain as pressure-like and pins and needles    He reports excellent pain relief with epidural steroid injections in the past   He has not undergone epidural steroid injections for several years  He last had epidural steroid injections in 2016 by myself  Other than as stated above, the patient denies any interval changes in medications, medical condition, mental condition, symptoms, or allergies since the last office visit  Review of Systems:    Review of Systems   Respiratory: Negative for shortness of breath  Cardiovascular: Negative for chest pain  Gastrointestinal: Negative for constipation, diarrhea, nausea and vomiting  Musculoskeletal: Negative for arthralgias, gait problem, joint swelling and myalgias  Skin: Negative for rash  Neurological: Negative for dizziness, seizures and weakness  All other systems reviewed and are negative          Patient Active Problem List   Diagnosis    Chronic pain syndrome    Bilateral lumbar radiculopathy    Lumbar canal stenosis    Acquired ankle/foot deformity    Pain in joints of both feet    Dermatophytosis    Atherosclerosis of arteries of extremities (HCC)    Pain in both feet    Onychomycosis    Neck muscle spasm    Anxiety 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    Benign hypertension with CKD (chronic kidney disease) stage III ([facility] [address] )    Bilateral leg edema    CKD (chronic kidney disease), stage III (HCC)    Microscopic hematuria    Proteinuria    Urinary frequency    Atrial fibrillation (HCC)    Benign prostatic hyperplasia    Congestive heart failure (HCC)    Arteriosclerosis of coronary artery    Allergic rhinitis    Aneurysm of abdominal aorta (HCC)    Angina pectoris (HCC)    Pain in joint involving ankle and foot    Atrial flutter (HCC)    Carpal tunnel syndrome    Chronic gout without tophus    Chronic low back pain    Chronic obstructive pulmonary disease (HCC)    Colon, diverticulosis    Deep venous insufficiency    Degenerative disc disease, lumbar    Difficulty in walking    Fecal soiling    Fibromyalgia    Fistula-in-ano    Hyperlipidemia    Insomnia, persistent    Memory loss    Moderate or severe vision impairment, one eye    Morbid obesity (HCC)    Nicotine dependence    Osteoarthritis of knee    Pacemaker    Sleep apnea    Urinary incontinence    Venous insufficiency (chronic) (peripheral)    BMI 45 0-49 9, adult (HCC)    Carbuncle of neck    Carbuncle of occipital region of scalp    Elevated glucose    Peripheral arteriosclerosis (HCC)    Lumbar radiculopathy    Metatarsalgia of both feet       Past Medical History:   Diagnosis Date    Arthritis     Atrial flutter (HCC)     Chronic kidney disease     stage 3    Coronary artery disease     2 stents    Fluid retention     Gout     Heart failure (HCC)     pacemaker    Hypertension     Pacemaker     Pulmonary emphysema ([facility] [address] )     Radiculopathy     last assessed 1/28/16     Shortness of breath     exertion    Sleep apnea     c pap       Past Surgical History:   Procedure Laterality Date    ANGIOPLASTY      x2 2 stents and then replaced    CARDIAC PACEMAKER PLACEMENT      pacemaker permanent placement dual chamber / last assessed 4/7/14 / implantation    [facility] CARDIAC SURGERY      pacemaker    CHOLECYSTECTOMY      CORONARY ANGIOPLASTY WITH STENT PLACEMENT      EPIDURAL BLOCK INJECTION N/A 5/26/2016    Procedure: BLOCK / INJECTION EPIDURAL STEROID LUMBAR  L4-5;  Surgeon: Raul Castillo MD;  [facility] MAIN OR;  Service:     EYE SURGERY      cataract left    KNEE ARTHROSCOPY W/ MENISCAL REPAIR Left     LUMBAR EPIDURAL INJECTION N/A 3/17/2016    Procedure: BLOCK / INJECTION LUMBAR  L4-5  (C-ARM); Surgeon: Raul Castillo MD;  Location: [facility] MAIN OR;  Service:        Family History   Problem Relation Age of Onset    Cancer Mother 80    Heart disease Mother     Hypertension Mother     Heart disease Father     Heart disease Brother     Diabetes Neg Hx     Stroke Neg Hx        Social History     Occupational History    RETIRED      Social History Main Topics    Smoking status: Current Some Day Smoker     Packs/day: 1 00     Years: 50 00     Types: Cigarettes    Smokeless tobacco: Never Used      Comment: reports occasional cigar or cigarette on weekend    Alcohol use No    Drug use: No    Sexual activity: Not on file         Current Outpatient Prescriptions:     albuterol (VENTOLIN HFA) 90 mcg/act inhaler, Inhale 2 puffs every 6 (six) hours as needed for wheezing, Disp: 1 Inhaler, Rfl: 6    allopurinol (ZYLOPRIM) 100 mg tablet, Take 1 tablet (100 mg total) by mouth daily, Disp: 90 tablet, Rfl: 3    ascorbic acid (VITAMIN C) 500 mg tablet, Take 500 mg by mouth daily  , Disp: , Rfl:     B Complex Vitamins (B COMPLEX 1 PO), Take 1 tablet by mouth daily  , Disp: , Rfl:     Biotin (BIOTIN 5000) 5 MG CAPS, Take 1,000 mcg by mouth daily  , Disp: , Rfl:     calcium carbonate-vitamin D (OSCAL-D) 500 mg-200 units per tablet, Take 2 tablets by mouth daily at bedtime  , Disp: , Rfl:     clopidogrel (PLAVIX) 75 mg tablet, TAKE 1 TABLET DAILY, Disp: 90 tablet, Rfl: 3    collagenase (SANTYL) ointment, Apply 1 application topically daily  , Disp: , Rfl:     Cranberry 1000 MG CAPS, Take 1 tablet by mouth 2 (two) times a day , Disp: , Rfl:     cyanocobalamin 1000 MCG tablet, Take 100 mcg by mouth daily, Disp: , Rfl:     felodipine (PLENDIL) 5 mg 24 hr tablet, TAKE 1 TABLET DAILY, Disp: 90 tablet, Rfl: 3    finasteride (PROSCAR) 5 mg tablet, Take 1 tablet (5 mg total) by mouth daily, Disp: 90 tablet, Rfl: 3    Flaxseed, Linseed, (EQL FLAX SEED OIL) 1000 MG CAPS, Take by mouth daily  , Disp: , Rfl:     fluticasone-salmeterol (ADVAIR) 250-50 mcg/dose inhaler, Inhale 1 puff 2 (two) times a day Rinse mouth after use , Disp: 1 Inhaler, Rfl: 6    furosemide (LASIX) 40 mg tablet, TAKE 1 TABLET BY MOUTH DAILY AS NEEDED FOR LEG EDEMA OR DYSNEA, Disp: 30 tablet, Rfl: 0    gabapentin (NEURONTIN) 300 mg capsule, Take 1 capsule (300 mg total) by mouth 3 (three) times a day for 30 days, Disp: 90 capsule, Rfl: 0    gabapentin (NEURONTIN) 600 MG tablet, Take 1 tablet (600 mg total) by mouth 3 (three) times a day for 30 days, Disp: 60 tablet, Rfl: 0    glucosamine-chondroitin 500-400 MG tablet, Take 1 tablet by mouth 2 (two) times a day , Disp: , Rfl:     lactase (LACTAID) 3,000 units tablet, Take 3,000 Units by mouth as needed  , Disp: , Rfl:     methylPREDNISolone 4 MG tablet therapy pack, Use as directed on package, Disp: 21 tablet, Rfl: 0    Omega-3 Fatty Acids (FISH OIL) 1,000 mg, Take 1,000 mg by mouth 2 (two) times a day , Disp: , Rfl:     oxyCODONE-acetaminophen (PERCOCET) 5-325 mg per tablet, 1 tablet to be taken 3 times daily as needed for leg pain, Disp: 30 tablet, Rfl: 0    PARoxetine (PAXIL) 20 mg tablet, TAKE 1/2 TABLET BY MOUTH DAILY, Disp: 30 tablet, Rfl: 0    psyllium (METAMUCIL) 58 6 % powder, Take 1 packet by mouth daily Indications: 1 tsp , Disp: , Rfl:     rivaroxaban (XARELTO) tablet, Take 20 mg by mouth , Disp: , Rfl:     simvastatin (ZOCOR) 20 mg tablet, Take 1 tablet (20 mg total) by mouth daily at bedtime, Disp: 90 tablet, Rfl: 3    ciclopirox (LOPROX) 0 77 % cream, Apply topically 2 (two) times a day for 20 days, Disp: 45 g, Rfl: 2    fluocinonide (LIDEX) 0 05 % cream, Apply topically 2 (two) times a day for 30 days, Disp: 30 g, Rfl: 2    gabapentin (NEURONTIN) 300 mg capsule, TAKE 1 CAPSULE (300 MG TOTAL) BY MOUTH 2 (TWO) TIMES A DAY FOR 30 DAYS, Disp: 60 capsule, Rfl: 2    hydrOXYzine HCL (ATARAX) 25 mg tablet, Take 1 tablet (25 mg total) by mouth 3 (three) times a day for 3 days, Disp: 9 tablet, Rfl: 0    traMADol (ULTRAM) 50 mg tablet, 1 tablet to be taken twice daily as needed for foot and leg pain (Patient not taking: Reported on 1/22/2019 ), Disp: 30 tablet, Rfl: 0    No Known Allergies    Physical Exam:    /66 (BP Location: Right arm, Patient Position: Sitting, Cuff Size: Standard)   Pulse 71   Resp 14   Ht 6' 1" (1 854 m)   Wt (!) 175 kg (384 lb 12 8 oz)   BMI 50 77 kg/m²     Constitutional:obese  Eyes:anicteric  HEENT:grossly intact  Neck:supple, symmetric, trachea midline and no masses   Pulmonary:even and unlabored  Cardiovascular:No edema or pitting edema present  Skin:Normal without rashes or lesions and well hydrated  Psychiatric:Mood and affect appropriate  Neurologic:Cranial Nerves II-XII grossly intact  Musculoskeletal:antalgic and ambulates with cane      Imaging  CT Scan  CT Scan  2/5/13 Lumbar  RESULT: Multidetector CT of the lumbar spine, without contrast, 2/5/2013,   for chronic low back pain  Axial and high-resolution 2D reformatted coronal, sagittal images were   acquired and reviewed with both osseous and soft tissue algorithms obtained  A mild scoliosis of the proximal lumbar spine is identified, convex to the   left  On the sagittal reformatted images, there is maintenance of the   lordosis without acute fracture or traumatic spondylolisthesis to S1  There   is, however, severe L1-L2 degenerative disc disease with disc space   obliteration, a vacuum disc phenomenon indicative of severe disc   degeneration, and endplate sclerosis   Osteophytosis at this level is also   noted as is mild retrolisthesis of the L1 vertebral body on L2, degenerative   in etiology  The remainder of the disc spaces are maintained in height  Incidentally noted is a 3 2 cm aneurysm of the infrarenal abdominal aorta   above the iliac bifurcation with calcified plaque scattered throughout the   abdominal aorta and iliac vessels  Axial imaging demonstrates no definite central protrusion at L1-L2 with   minimal vertebral body spondylosis impinging upon the thecal sac  The neural   foramina, however, are patent  There is no significant facet hypertrophy at   this level  At L2-L3, an annular disc bulge is suspected evoking bilateral recess   stenosis  The neural foramina are patent and there is no significant facet   arthropathy  At L3-L4, again, an annular disc bulge in conjunction with mild facet   hypertrophy evokes bilateral recess stenosis  The central canal is patent as   are the neural foramina  At L4-L5, there is moderate central and bilateral recess stenosis secondary   to severe facet hypertrophy  No definite disc protrusion is seen at this   level  At L5-S1, there is no focal disc protrusion nor is there definite central or   lateral stenosis  Asymmetric facet hypertrophy is present, left greater than   right  IMPRESSION: 1  Scoliosis with severe L1-L2 degenerative disc disease where   there is mild retrolisthesis and minimal central stenosis  2  Multilevel facet arthropathy most severe at L4-L5 with moderate central   and bilateral recess stenosis  CT lumbar spine without contrast    (Results Pending)     Show images for XR spine lumbar complete w bending minimum 6 views   Order Report      Order Details   Order Questions     Question Answer Comment   Exam reason Leg pain    Note:  Enter reason for exam          Reason For Exam     Leg pain   Dx: Radiculopathy of lumbar region [M54 16 (ICD-10-CM)];  Pain in both feet [M79 671, M79 672 (ICD-10-CM)]   Study Result     LUMBAR SPINE     INDICATION:   M54 16: Radiculopathy, lumbar region  M79 671: Pain in right foot  M79 672: Pain in left foot    Chronic low back pain      COMPARISON:  Lumbar spine CT from 2/5/2013      VIEWS:  XR SPINE LUMBAR COMPLETE W BENDING MINIMUM 6 VIEWS        FINDINGS:     There is no evidence of acute fracture or destructive osseous lesion      No evidence of instability in flexion and extension      Severe degenerative disc space narrowing at L1-L2      Mild degenerative disc disease throughout the lumbar spine      No significant lumbar degenerative change noted      The pedicles appear intact      Small abdominal aortic aneurysm again noted      IMPRESSION:     No acute osseous abnormality        Degenerative changes as described      Small abdominal aortic aneurysm again noted         Workstation performed: [id number]      Imaging     XR spine lumbar complete w bending minimum 6 views (Order #[id number]) on 1/9/2019 - Imaging Information   Result History     XR spine lumbar complete w bending minimum 6 views (Order #[id number]) on 1/13/2019 - Order Result History Report           Orders Placed This Encounter   Procedures    CT lumbar spine without contrast

## 2019-01-22 NOTE — LETTER
January 22, 2019     MD Clary Boweredward [address]  [id number]    Patient: Jayce Stephen   YOB: 1940   Date of Visit: 1/22/2019       Dear Dr Soler Mail:    Thank you for referring Timo Espinoza to me for evaluation  Below are my notes for this consultation  If you have questions, please do not hesitate to call me  I look forward to following your patient along with you  Sincerely,        Catherine Morales MD        CC: No Recipients  Catherine Morales MD  1/22/2019  8:58 AM  Sign at close encounter  Pain Medicine Follow-Up Note    Assessment:  1  Chronic pain syndrome    2  Chronic bilateral low back pain with bilateral sciatica    3  Lumbar radiculopathy        Plan:  Orders Placed This Encounter   Procedures    CT lumbar spine without contrast     Standing Status:   Future     Standing Expiration Date:   1/22/2023     Scheduling Instructions: There is no prep for this study  Please bring your insurance cards, a form of photo ID and a list of your medications with you  Arrive 15 minutes prior to your appointment time to register  On the day of your test, please bring any prior CT or MRI studies of this area with you that were not performed at a [facility]  To schedule this appointment, please contact Central Scheduling at [phone]  Order Specific Question:   What is the patient's sedation requirement? Answer:   Unknown     My impressions and treatment recommendations were discussed in detail with the patient who verbalized understanding and had no further questions  The patient is reporting significant pain in his low back and bilateral lower extremities  He had undergone epidural steroid injections several years ago with excellent clinical benefit  I have not seen him since 2016  His last imaging study for his low back was performed in 2013   As such, I felt a reasonable to have the patient undergo a CT of the lumbar spine to evaluate for any pathology that may be contributing to his pain complaints  I also asked the patient to speak to the nephrologist regarding the possibility of increasing his gabapentin to gabapentin 900 mg 3 times daily  The patient's wife stated that she would call his nephrologist today to find out if that was okay  Once the patient undergoes the CT of the lumbar spine, I will discuss further treatment options  Discharge instructions were provided  I personally saw and examined the patient and I agree with the above discussed plan of care  History of Present Illness:    Misa Lucero is a 66 y o  male who presents to [facility] for interval re-evaluation of the above stated pain complaints  The patient has a past medical and chronic pain history as outlined in the assessment section  He was last seen on October 10, 2016  At today's office visit, the patient's pain score is 8/10 on the verbal numerical pain rating scale  The patient states that his pain is primarily in the low back with radiation into the bilateral lower extremities  He states that his pain is constant in nature and he reports the quality of his pain as pressure-like and pins and needles    He reports excellent pain relief with epidural steroid injections in the past   He has not undergone epidural steroid injections for several years  He last had epidural steroid injections in 2016 by myself  Other than as stated above, the patient denies any interval changes in medications, medical condition, mental condition, symptoms, or allergies since the last office visit  Review of Systems:    Review of Systems   Respiratory: Negative for shortness of breath  Cardiovascular: Negative for chest pain  Gastrointestinal: Negative for constipation, diarrhea, nausea and vomiting  Musculoskeletal: Negative for arthralgias, gait problem, joint swelling and myalgias     Skin: Negative for rash  Neurological: Negative for dizziness, seizures and weakness  All other systems reviewed and are negative          Patient Active Problem List   Diagnosis    Chronic pain syndrome    Bilateral lumbar radiculopathy    Lumbar canal stenosis    Acquired ankle/foot deformity    Pain in joints of both feet    Dermatophytosis    Atherosclerosis of arteries of extremities ([facility])    Pain in both feet    Onychomycosis    Neck muscle spasm    Anxiety 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    Benign hypertension with CKD (chronic kidney disease) stage III (HCC)    Bilateral leg edema    CKD (chronic kidney disease), stage III (HCC)    Microscopic hematuria    Proteinuria    Urinary frequency    Atrial fibrillation (HCC)    Benign prostatic hyperplasia    Congestive heart failure (HCC)    Arteriosclerosis of coronary artery    Allergic rhinitis    Aneurysm of abdominal aorta (HCC)    Angina pectoris (HCC)    Pain in joint involving ankle and foot    Atrial flutter (HCC)    Carpal tunnel syndrome    Chronic gout without tophus    Chronic low back pain    Chronic obstructive pulmonary disease ([facility])    Colon, diverticulosis    Deep venous insufficiency    Degenerative disc disease, lumbar    Difficulty in walking    Fecal soiling    Fibromyalgia    Fistula-in-ano    Hyperlipidemia    Insomnia, persistent    Memory loss    Moderate or severe vision impairment, one eye    Morbid obesity ([facility])    Nicotine dependence    Osteoarthritis of knee    Pacemaker    Sleep apnea    Urinary incontinence    Venous insufficiency (chronic) (peripheral)    BMI 45 0-49 9, adult (HCC)    Carbuncle of neck    Carbuncle of occipital region of scalp    Elevated glucose    Peripheral arteriosclerosis (HCC)    Lumbar radiculopathy    Metatarsalgia of both feet       Past Medical History:   Diagnosis Date    Arthritis     Atrial flutter ([facility])     Chronic kidney disease     stage 3    Coronary artery disease     2 stents    Fluid retention     Gout     Heart failure (HCC)     pacemaker    Hypertension     Pacemaker     Pulmonary emphysema ([address] )     Radiculopathy     last assessed 1/28/16     Shortness of breath     exertion    Sleep apnea     c pap       Past Surgical History:   Procedure Laterality Date    ANGIOPLASTY      x2 2 stents and then replaced   [address]      pacemaker permanent placement dual chamber / last assessed 4/7/14 / implantation     CARDIAC SURGERY      pacemaker    CHOLECYSTECTOMY      CORONARY ANGIOPLASTY WITH STENT PLACEMENT      EPIDURAL BLOCK INJECTION N/A 5/26/2016    Procedure: BLOCK / INJECTION EPIDURAL STEROID LUMBAR  L4-5;  Surgeon: Ed Kendall MD;  Location: [place] [address] MAIN OR;  Service:     EYE SURGERY      cataract left    KNEE ARTHROSCOPY W/ MENISCAL REPAIR Left     LUMBAR EPIDURAL INJECTION N/A 3/17/2016    Procedure: BLOCK / INJECTION LUMBAR  L4-5  (C-ARM);   Surgeon: Ed Kendall MD;  [facility] MAIN OR;  Service:        Family History   Problem Relation Age of Onset    Cancer Mother 80    Heart disease Mother     Hypertension Mother     Heart disease Father     Heart disease Brother     Diabetes Neg Hx     Stroke Neg Hx        Social History     Occupational History    RETIRED      Social History Main Topics    Smoking status: Current Some Day Smoker     Packs/day: 1 00     Years: 50 00     Types: Cigarettes    Smokeless tobacco: Never Used      Comment: reports occasional cigar or cigarette on weekend    Alcohol use No    Drug use: No    Sexual activity: Not on file         Current Outpatient Prescriptions:     albuterol (VENTOLIN HFA) 90 mcg/act inhaler, Inhale 2 puffs every 6 (six) hours as needed for wheezing, Disp: 1 Inhaler, Rfl: 6    allopurinol (ZYLOPRIM) 100 mg tablet, Take 1 tablet (100 mg total) by mouth daily, Disp: 90 tablet, Rfl: 3    ascorbic acid (VITAMIN C) 500 mg tablet, Take 500 mg by mouth daily  , Disp: , Rfl:     B Complex Vitamins (B COMPLEX 1 PO), Take 1 tablet by mouth daily  , Disp: , Rfl:     Biotin (BIOTIN 5000) 5 MG CAPS, Take 1,000 mcg by mouth daily  , Disp: , Rfl:     calcium carbonate-vitamin D (OSCAL-D) 500 mg-200 units per tablet, Take 2 tablets by mouth daily at bedtime  , Disp: , Rfl:     clopidogrel (PLAVIX) 75 mg tablet, TAKE 1 TABLET DAILY, Disp: 90 tablet, Rfl: 3    collagenase (SANTYL) ointment, Apply 1 application topically daily  , Disp: , Rfl:     Cranberry 1000 MG CAPS, Take 1 tablet by mouth 2 (two) times a day , Disp: , Rfl:     cyanocobalamin 1000 MCG tablet, Take 100 mcg by mouth daily, Disp: , Rfl:     felodipine (PLENDIL) 5 mg 24 hr tablet, TAKE 1 TABLET DAILY, Disp: 90 tablet, Rfl: 3    finasteride (PROSCAR) 5 mg tablet, Take 1 tablet (5 mg total) by mouth daily, Disp: 90 tablet, Rfl: 3    Flaxseed, Linseed, (EQL FLAX SEED OIL) 1000 MG CAPS, Take by mouth daily  , Disp: , Rfl:     fluticasone-salmeterol (ADVAIR) 250-50 mcg/dose inhaler, Inhale 1 puff 2 (two) times a day Rinse mouth after use , Disp: 1 Inhaler, Rfl: 6    furosemide (LASIX) 40 mg tablet, TAKE 1 TABLET BY MOUTH DAILY AS NEEDED FOR LEG EDEMA OR DYSNEA, Disp: 30 tablet, Rfl: 0    gabapentin (NEURONTIN) 300 mg capsule, Take 1 capsule (300 mg total) by mouth 3 (three) times a day for 30 days, Disp: 90 capsule, Rfl: 0    gabapentin (NEURONTIN) 600 MG tablet, Take 1 tablet (600 mg total) by mouth 3 (three) times a day for 30 days, Disp: 60 tablet, Rfl: 0    glucosamine-chondroitin 500-400 MG tablet, Take 1 tablet by mouth 2 (two) times a day , Disp: , Rfl:     lactase (LACTAID) 3,000 units tablet, Take 3,000 Units by mouth as needed  , Disp: , Rfl:     methylPREDNISolone 4 MG tablet therapy pack, Use as directed on package, Disp: 21 tablet, Rfl: 0    Omega-3 Fatty Acids (FISH OIL) 1,000 mg, Take 1,000 mg by mouth 2 (two) times a day , Disp: , Rfl:     oxyCODONE-acetaminophen (PERCOCET) 5-325 mg per tablet, 1 tablet to be taken 3 times daily as needed for leg pain, Disp: 30 tablet, Rfl: 0    PARoxetine (PAXIL) 20 mg tablet, TAKE 1/2 TABLET BY MOUTH DAILY, Disp: 30 tablet, Rfl: 0    psyllium (METAMUCIL) 58 6 % powder, Take 1 packet by mouth daily Indications: 1 tsp , Disp: , Rfl:     rivaroxaban (XARELTO) tablet, Take 20 mg by mouth , Disp: , Rfl:     simvastatin (ZOCOR) 20 mg tablet, Take 1 tablet (20 mg total) by mouth daily at bedtime, Disp: 90 tablet, Rfl: 3    ciclopirox (LOPROX) 0 77 % cream, Apply topically 2 (two) times a day for 20 days, Disp: 45 g, Rfl: 2    fluocinonide (LIDEX) 0 05 % cream, Apply topically 2 (two) times a day for 30 days, Disp: 30 g, Rfl: 2    gabapentin (NEURONTIN) 300 mg capsule, TAKE 1 CAPSULE (300 MG TOTAL) BY MOUTH 2 (TWO) TIMES A DAY FOR 30 DAYS, Disp: 60 capsule, Rfl: 2    hydrOXYzine HCL (ATARAX) 25 mg tablet, Take 1 tablet (25 mg total) by mouth 3 (three) times a day for 3 days, Disp: 9 tablet, Rfl: 0    traMADol (ULTRAM) 50 mg tablet, 1 tablet to be taken twice daily as needed for foot and leg pain (Patient not taking: Reported on 1/22/2019 ), Disp: 30 tablet, Rfl: 0    No Known Allergies    Physical Exam:    /66 (BP Location: Right arm, Patient Position: Sitting, Cuff Size: Standard)   Pulse 71   Resp 14   Ht 6' 1" (1 854 m)   Wt (!) 175 kg (384 lb 12 8 oz)   BMI 50 77 kg/m²      Constitutional:obese  Eyes:anicteric  HEENT:grossly intact  Neck:supple, symmetric, trachea midline and no masses   Pulmonary:even and unlabored  Cardiovascular:No edema or pitting edema present  Skin:Normal without rashes or lesions and well hydrated  Psychiatric:Mood and affect appropriate  Neurologic:Cranial Nerves II-XII grossly intact  Musculoskeletal:antalgic and ambulates with cane      Imaging  CT Scan  CT Scan  2/5/13 Lumbar  RESULT: Multidetector CT of the lumbar spine, without contrast, 2/5/2013,   for chronic low back pain     Axial and high-resolution 2D reformatted coronal, sagittal images were   acquired and reviewed with both osseous and soft tissue algorithms obtained  A mild scoliosis of the proximal lumbar spine is identified, convex to the   left  On the sagittal reformatted images, there is maintenance of the   lordosis without acute fracture or traumatic spondylolisthesis to S1  There   is, however, severe L1-L2 degenerative disc disease with disc space   obliteration, a vacuum disc phenomenon indicative of severe disc   degeneration, and endplate sclerosis  Osteophytosis at this level is also   noted as is mild retrolisthesis of the L1 vertebral body on L2, degenerative   in etiology  The remainder of the disc spaces are maintained in height  Incidentally noted is a 3 2 cm aneurysm of the infrarenal abdominal aorta   above the iliac bifurcation with calcified plaque scattered throughout the   abdominal aorta and iliac vessels  Axial imaging demonstrates no definite central protrusion at L1-L2 with   minimal vertebral body spondylosis impinging upon the thecal sac  The neural   foramina, however, are patent  There is no significant facet hypertrophy at   this level  At L2-L3, an annular disc bulge is suspected evoking bilateral recess   stenosis  The neural foramina are patent and there is no significant facet   arthropathy  At L3-L4, again, an annular disc bulge in conjunction with mild facet   hypertrophy evokes bilateral recess stenosis  The central canal is patent as   are the neural foramina  At L4-L5, there is moderate central and bilateral recess stenosis secondary   to severe facet hypertrophy  No definite disc protrusion is seen at this   level  At L5-S1, there is no focal disc protrusion nor is there definite central or   lateral stenosis  Asymmetric facet hypertrophy is present, left greater than   right  IMPRESSION: 1   Scoliosis with severe L1-L2 degenerative disc disease where   there is mild retrolisthesis and minimal central stenosis  2  Multilevel facet arthropathy most severe at L4-L5 with moderate central   and bilateral recess stenosis  CT lumbar spine without contrast    (Results Pending)     Show images for XR spine lumbar complete w bending minimum 6 views   Order Report      Order Details   Order Questions     Question Answer Comment   Exam reason Leg pain    Note:  Enter reason for exam          Reason For Exam     Leg pain   Dx: Radiculopathy of lumbar region [M54 16 (ICD-10-CM)]; Pain in both feet [M79 671, M79 672 (ICD-10-CM)]   Study Result     LUMBAR SPINE     INDICATION:   M54 16: Radiculopathy, lumbar region  M79 671: Pain in right foot  M79 672: Pain in left foot    Chronic low back pain      COMPARISON:  Lumbar spine CT from 2/5/2013      VIEWS:  XR SPINE LUMBAR COMPLETE W BENDING MINIMUM 6 VIEWS        FINDINGS:     There is no evidence of acute fracture or destructive osseous lesion      No evidence of instability in flexion and extension      Severe degenerative disc space narrowing at L1-L2      Mild degenerative disc disease throughout the lumbar spine      No significant lumbar degenerative change noted      The pedicles appear intact      Small abdominal aortic aneurysm again noted      IMPRESSION:     No acute osseous abnormality        Degenerative changes as described      Small abdominal aortic aneurysm again noted         Workstation performed: [id number]      Imaging     XR spine lumbar complete w bending minimum 6 views (Order #[id number]) on 1/9/2019 - Imaging Information   Result History     XR spine lumbar complete w bending minimum 6 views (Order #[id number]) on 1/13/2019 - Order Result History Report           Orders Placed This Encounter   Procedures    CT lumbar spine without contrast

## 2019-01-24 ENCOUNTER — OFFICE VISIT (OUTPATIENT)
Dept: PHYSICAL THERAPY | Facility: CLINIC | Age: 79
End: 2019-01-24
Payer: MEDICARE

## 2019-01-24 DIAGNOSIS — M54.41 ACUTE BILATERAL LOW BACK PAIN WITH BILATERAL SCIATICA: Primary | ICD-10-CM

## 2019-01-24 DIAGNOSIS — M79.604 CHRONIC PAIN OF BOTH LOWER EXTREMITIES: ICD-10-CM

## 2019-01-24 DIAGNOSIS — M79.605 CHRONIC PAIN OF BOTH LOWER EXTREMITIES: ICD-10-CM

## 2019-01-24 DIAGNOSIS — M54.42 ACUTE BILATERAL LOW BACK PAIN WITH BILATERAL SCIATICA: Primary | ICD-10-CM

## 2019-01-24 DIAGNOSIS — G89.29 CHRONIC PAIN OF BOTH LOWER EXTREMITIES: ICD-10-CM

## 2019-01-24 PROCEDURE — 97110 THERAPEUTIC EXERCISES: CPT | Performed by: PHYSICAL THERAPIST

## 2019-01-24 NOTE — PROGRESS NOTES
Daily Note     Today's date: 2019  Patient name: Alisson Cheema  : 1940  MRN: [id number]  Referring provider: Tasha Sims MD  Dx:   Encounter Diagnosis     ICD-10-CM    1  Acute bilateral low back pain with bilateral sciatica M54 42     M54 41    2  Chronic pain of both lower extremities M79 604     M79 605     G89 29        Start Time: 0930  Stop Time: 1015  Total time in clinic (min): 45 minutes    Subjective: Pt reports a 20% occurrence rate of LBP felt only with STS transfer, denies pain otherwise  Pt reports he is compliant with HEP, reports he stands or walks "a lap" every 20 minutes" and does "backwards stretches in the kitchen at the counter" several times during his day  Pt reports his primary concern remains ongoing LE pain in B shank location and also reports R shoulder issues resulting form a fall in 2018 (to which he was given cortisone injection and PT rx, however never used PT rx and hopes to havre PT for the shoulder )      Objective: See treatment diary below  Precautions: Standard  SOB  Daily Treatment Diary     Manual                                                                                  Exercise Diary  1 2           Posture Edu 10' Rev           DEANNA 5x 5x2           TrvA -- 15x           TrvA ball sq -- 15x           TrvA Abd -- Blue 15x           TrvA STS -- 5x2           SOC -- 5x                                                                                                                                                          HEP Edu/initated Progressed/rev           Time 10' 40'               Modalities              MHP  5'                                           Assessment: Tolerated treatment well  Patient demo good imporvements regarding LBP and demo good compliance with HEP  Plan: Continue per plan of care  Plan for lumbar spine DC in next 1-2weeks  May initated tx for R shldr at that time

## 2019-01-28 ENCOUNTER — OFFICE VISIT (OUTPATIENT)
Dept: PHYSICAL THERAPY | Facility: CLINIC | Age: 79
End: 2019-01-28
Payer: MEDICARE

## 2019-01-28 DIAGNOSIS — M54.41 ACUTE BILATERAL LOW BACK PAIN WITH BILATERAL SCIATICA: Primary | ICD-10-CM

## 2019-01-28 DIAGNOSIS — M54.42 ACUTE BILATERAL LOW BACK PAIN WITH BILATERAL SCIATICA: Primary | ICD-10-CM

## 2019-01-28 DIAGNOSIS — G89.29 CHRONIC PAIN OF BOTH LOWER EXTREMITIES: ICD-10-CM

## 2019-01-28 DIAGNOSIS — M79.605 CHRONIC PAIN OF BOTH LOWER EXTREMITIES: ICD-10-CM

## 2019-01-28 DIAGNOSIS — M79.604 CHRONIC PAIN OF BOTH LOWER EXTREMITIES: ICD-10-CM

## 2019-01-28 PROCEDURE — 97110 THERAPEUTIC EXERCISES: CPT | Performed by: PHYSICAL THERAPIST

## 2019-01-28 NOTE — PROGRESS NOTES
Daily Note /Discharge    Today's date: 2019  Patient name: Summer Mcdonald  : 1940  MRN: [id number]  Referring provider: Jimmie Walker MD  Dx:   Encounter Diagnosis     ICD-10-CM    1  Acute bilateral low back pain with bilateral sciatica M54 42     M54 41    2  Chronic pain of both lower extremities M79 604     M79 605     G89 29        Start Time: 08  Stop Time: 930  Total time in clinic (min): 45 minutes    Subjective: "I feel so much better my legs dont even hurt anymore " Pt reports LBP is intermittent and stretching can offer relief with "some soreness" which does not last "too long " 4/10 soreness at most in lumbar spine  Pt feels he is "suprisingly better" and feels he is "all set" as he can manage this at home with est HEP  Pt very pleased at this time with his inc ability to amb in community and dec/nil pain with STS xfers  Objective: See treatment diary below  Precautions: Standard  SOB  Daily Treatment Diary     Manual                                                                                 Exercise Diary  1 2 3          Posture Edu 10' Rev Rev          DEANNA 5x 5x2 5x          TrvA -- 15x 15x          TrvA ball sq -- 15x 15x          TrvA Abd -- Blue 15x 15x          TrvA STS -- 5x2 10x          SOC -- 5x 5x3                                                                                                                                                         HEP Edu/initated Progressed/rev Rev          Time 8' 40' 45'              Modalities              MHP  5'                                           Assessment: Tolerated treatment well  Patient demo good response to est POC and good I with HEP at this time  All goals met per pt report at this time  Plan: DC pt at this time, pt feels return to PLOF, I with HEP self management

## 2019-01-29 ENCOUNTER — HOSPITAL ENCOUNTER (OUTPATIENT)
Dept: RADIOLOGY | Facility: HOSPITAL | Age: 79
Discharge: HOME | End: 2019-01-29
Attending: ANESTHESIOLOGY
Payer: MEDICARE

## 2019-01-29 DIAGNOSIS — G89.29 CHRONIC BILATERAL LOW BACK PAIN WITH BILATERAL SCIATICA: ICD-10-CM

## 2019-01-29 DIAGNOSIS — G89.4 CHRONIC PAIN SYNDROME: ICD-10-CM

## 2019-01-29 DIAGNOSIS — M54.42 CHRONIC BILATERAL LOW BACK PAIN WITH BILATERAL SCIATICA: ICD-10-CM

## 2019-01-29 DIAGNOSIS — M54.41 CHRONIC BILATERAL LOW BACK PAIN WITH BILATERAL SCIATICA: ICD-10-CM

## 2019-01-29 DIAGNOSIS — M54.16 LUMBAR RADICULOPATHY: ICD-10-CM

## 2019-01-29 PROCEDURE — 72131 CT LUMBAR SPINE W/O DYE: CPT

## 2019-01-30 ENCOUNTER — TELEPHONE (OUTPATIENT)
Dept: PAIN MEDICINE | Facility: CLINIC | Age: 79
End: 2019-01-30

## 2019-01-30 DIAGNOSIS — I70.209 PERIPHERAL ARTERIOSCLEROSIS (HCC): ICD-10-CM

## 2019-01-30 DIAGNOSIS — M54.16 RADICULOPATHY OF LUMBAR REGION: ICD-10-CM

## 2019-01-30 RX ORDER — GABAPENTIN 800 MG/1
800 TABLET ORAL 3 TIMES DAILY
Qty: 90 TABLET | Refills: 0 | Status: SHIPPED | OUTPATIENT
Start: 2019-01-30 | End: 2019-02-25 | Stop reason: SDUPTHER

## 2019-01-30 NOTE — TELEPHONE ENCOUNTER
----- Message from Courtney Jackson MD sent at 1/30/2019 12:14 PM EST -----  Regarding: CT L-spine results  CT of the lumbar spine shows moderately severe L4-L5 spinal canal stenosis  This is about the same as it was previously  As such, I feel it reasonable to offer the patient a L4-L5 lumbar epidural steroid injection since this could be potentially therapeutic      Proc:  L4-L5 LESI  Dx:  M48 062, M54 16, M54 5, G89 4  CPT:  81721

## 2019-02-01 DIAGNOSIS — I70.209 PERIPHERAL ARTERIOSCLEROSIS (HCC): ICD-10-CM

## 2019-02-01 DIAGNOSIS — M54.16 RADICULOPATHY OF LUMBAR REGION: ICD-10-CM

## 2019-02-01 RX ORDER — GABAPENTIN 600 MG/1
600 TABLET ORAL 2 TIMES DAILY
Qty: 60 TABLET | Refills: 0 | Status: SHIPPED | OUTPATIENT
Start: 2019-02-01 | End: 2019-02-26 | Stop reason: SDUPTHER

## 2019-02-01 NOTE — TELEPHONE ENCOUNTER
Please advise if you approve a 7 day hold of Plavix for a lumbar epidural steroid injection   Thanks

## 2019-02-01 NOTE — TELEPHONE ENCOUNTER
S/w pt, reviewed CT results  Pt would like to proceed with LESI, pt states he takes Xarelto as prescribed by Dr Shania Chnadler with cardiology and Dr Karlene Otto prescribes his Plavix, the pt's PCP  Informed pt that we will have to get clearance from both doctors prior to scheduling  Pt verbalized understanding

## 2019-02-04 DIAGNOSIS — R60.0 BILATERAL LEG EDEMA: ICD-10-CM

## 2019-02-04 DIAGNOSIS — I70.209 PERIPHERAL ARTERIOSCLEROSIS (HCC): ICD-10-CM

## 2019-02-04 DIAGNOSIS — M54.16 RADICULOPATHY OF LUMBAR REGION: ICD-10-CM

## 2019-02-04 PROBLEM — M48.062 SPINAL STENOSIS, LUMBAR REGION, WITH NEUROGENIC CLAUDICATION: Status: ACTIVE | Noted: 2019-02-04

## 2019-02-04 PROBLEM — M54.50 LOW BACK PAIN: Status: ACTIVE | Noted: 2019-02-04

## 2019-02-04 RX ORDER — FUROSEMIDE 40 MG/1
40 TABLET ORAL DAILY
Qty: 30 TABLET | Refills: 10 | Status: SHIPPED | OUTPATIENT
Start: 2019-02-04 | End: 2019-02-26 | Stop reason: SDUPTHER

## 2019-02-04 NOTE — TELEPHONE ENCOUNTER
Scheduled pt for L4 L5 LESI for 2/14/19  Went over pre-procedure instructions below:  Pt is currently taking Plavix prescribed by Dr Cade and Dr Wei Enriquez has approved the 7 day hold   Pt is also taking Xarelto prescribed by Dr Elke Mai who has also approved 2 day hold  Pt has been instructed to hold Plavix 7 days with last dose on 2/6/19  Pt has been instructed to hold Xarelto for 2 days with last dose 2/11/19     Pt denies any other Nsaid or blood thinner  Nothing to eat or drink 1 hr prior to procedure  Need to arrange transportation  Proper clothing for procedure  If ill or placed on antibiotics please call to reschedule

## 2019-02-05 ENCOUNTER — OFFICE VISIT (OUTPATIENT)
Dept: PODIATRY | Facility: CLINIC | Age: 79
End: 2019-02-05
Payer: MEDICARE

## 2019-02-05 VITALS
BODY MASS INDEX: 41.75 KG/M2 | HEART RATE: 71 BPM | RESPIRATION RATE: 18 BRPM | HEIGHT: 73 IN | DIASTOLIC BLOOD PRESSURE: 66 MMHG | WEIGHT: 315 LBS | SYSTOLIC BLOOD PRESSURE: 138 MMHG

## 2019-02-05 DIAGNOSIS — M54.16 LUMBAR RADICULOPATHY: ICD-10-CM

## 2019-02-05 DIAGNOSIS — M79.671 PAIN IN BOTH FEET: Primary | ICD-10-CM

## 2019-02-05 DIAGNOSIS — I87.2 DEEP VENOUS INSUFFICIENCY: ICD-10-CM

## 2019-02-05 DIAGNOSIS — M79.672 PAIN IN BOTH FEET: Primary | ICD-10-CM

## 2019-02-05 DIAGNOSIS — M77.42 METATARSALGIA OF BOTH FEET: ICD-10-CM

## 2019-02-05 DIAGNOSIS — M77.41 METATARSALGIA OF BOTH FEET: ICD-10-CM

## 2019-02-05 PROCEDURE — 99213 OFFICE O/P EST LOW 20 MIN: CPT | Performed by: PODIATRIST

## 2019-02-05 RX ORDER — GABAPENTIN 600 MG/1
600 TABLET ORAL 3 TIMES DAILY
Qty: 60 TABLET | Refills: 0 | Status: SHIPPED | OUTPATIENT
Start: 2019-02-05 | End: 2019-02-26 | Stop reason: SDUPTHER

## 2019-02-05 NOTE — PROGRESS NOTES
Procedures   Foot Exam   Assessment/Plan:  Pain upon ambulation  Limb pain  Rule out radiculopathy  Edema secondary to venous insufficiency  Rule out degenerative disc disease  Probable spinal stenosis      Plan  Attempt pain control  Patient is taking gabapentin 800 mg t i d  With some  relief  He has breakthrough pain with Percocet  He was unable to take Medrol Dosepak as directed  Follow up with pain management      No problem-specific Assessment & Plan notes found for this encounter          There are no diagnoses linked to this encounter        Subjective:   Patient has continued ongoing pain in his legs  however it is improved  He rates pain on a scale 3/10  He is seeing pain management  He is slated to have epidural spinal injection  Past Medical History:   Diagnosis Date    Arthritis      Atrial flutter (HCC)      Chronic kidney disease       stage 3    Coronary artery disease       2 stents    Fluid retention      Gout      Heart failure (HCC)       pacemaker    Hypertension      Pacemaker      Pulmonary emphysema (HCC)      Radiculopathy       last assessed 1/28/16     Shortness of breath       exertion    Sleep apnea       c pap         Past Surgical History:   Procedure Laterality Date    ANGIOPLASTY         x2 2 stents and then replaced    CARDIAC PACEMAKER PLACEMENT         pacemaker permanent placement dual chamber / last assessed 4/7/14 / implantation     CARDIAC SURGERY         pacemaker    CHOLECYSTECTOMY        CORONARY ANGIOPLASTY WITH STENT PLACEMENT        EPIDURAL BLOCK INJECTION N/A 5/26/2016     Procedure: BLOCK / INJECTION EPIDURAL STEROID LUMBAR  L4-5;  Surgeon: Adriana Herbert MD;  Location: [facility] MAIN OR;  Service:     EYE SURGERY         cataract left    KNEE ARTHROSCOPY W/ MENISCAL REPAIR Left      LUMBAR EPIDURAL INJECTION N/A 3/17/2016     Procedure: BLOCK / INJECTION LUMBAR  L4-5  (C-[facility]);   Surgeon: Adriana Herbert MD;  Location: [facility] OR;  Service:          No Known Allergies        Current Outpatient Prescriptions:     albuterol (VENTOLIN HFA) 90 mcg/act inhaler, Inhale 2 puffs every 6 (six) hours as needed for wheezing, Disp: 1 Inhaler, Rfl: 6    allopurinol (ZYLOPRIM) 100 mg tablet, Take 1 tablet (100 mg total) by mouth daily, Disp: 90 tablet, Rfl: 3    ascorbic acid (VITAMIN C) 500 mg tablet, Take 500 mg by mouth daily  , Disp: , Rfl:     B Complex Vitamins (B COMPLEX 1 PO), Take 1 tablet by mouth daily  , Disp: , Rfl:     Biotin (BIOTIN 5000) 5 MG CAPS, Take 1,000 mcg by mouth daily  , Disp: , Rfl:     calcium carbonate-vitamin D (OSCAL-D) 500 mg-200 units per tablet, Take 2 tablets by mouth daily at bedtime  , Disp: , Rfl:     ciclopirox (LOPROX) 0 77 % cream, Apply topically 2 (two) times a day for 20 days, Disp: 45 g, Rfl: 2    clopidogrel (PLAVIX) 75 mg tablet, TAKE 1 TABLET DAILY, Disp: 90 tablet, Rfl: 3    collagenase (SANTYL) ointment, Apply 1 application topically daily  , Disp: , Rfl:     Cranberry 1000 MG CAPS, Take 1 tablet by mouth 2 (two) times a day , Disp: , Rfl:     cyanocobalamin 1000 MCG tablet, Take 100 mcg by mouth daily, Disp: , Rfl:     felodipine (PLENDIL) 5 mg 24 hr tablet, TAKE 1 TABLET DAILY, Disp: 90 tablet, Rfl: 3    finasteride (PROSCAR) 5 mg tablet, Take 1 tablet (5 mg total) by mouth daily, Disp: 90 tablet, Rfl: 3    Flaxseed, Linseed, (EQL FLAX SEED OIL) 1000 MG CAPS, Take by mouth daily  , Disp: , Rfl:     fluocinonide (LIDEX) 0 05 % cream, Apply topically 2 (two) times a day for 30 days, Disp: 30 g, Rfl: 2    fluticasone-salmeterol (ADVAIR) 250-50 mcg/dose inhaler, Inhale 1 puff 2 (two) times a day Rinse mouth after use , Disp: 1 Inhaler, Rfl: 6    furosemide (LASIX) 40 mg tablet, TAKE 1 TABLET BY MOUTH DAILY AS NEEDED FOR LEG EDEMA OR DYSNEA, Disp: 30 tablet, Rfl: 0    gabapentin (NEURONTIN) 300 mg capsule, TAKE 1 CAPSULE (300 MG TOTAL) BY MOUTH 2 (TWO) TIMES A DAY FOR 30 DAYS, Disp: 60 capsule, Rfl: 2    gabapentin (NEURONTIN) 300 mg capsule, Take 1 capsule (300 mg total) by mouth 3 (three) times a day for 30 days, Disp: 90 capsule, Rfl: 0    gabapentin (NEURONTIN) 600 MG tablet, Take 1 tablet (600 mg total) by mouth 3 (three) times a day for 30 days, Disp: 60 tablet, Rfl: 0    glucosamine-chondroitin 500-400 MG tablet, Take 1 tablet by mouth 2 (two) times a day , Disp: , Rfl:     hydrOXYzine HCL (ATARAX) 25 mg tablet, Take 1 tablet (25 mg total) by mouth 3 (three) times a day for 3 days, Disp: 9 tablet, Rfl: 0    lactase (LACTAID) 3,000 units tablet, Take 3,000 Units by mouth as needed  , Disp: , Rfl:     methylPREDNISolone 4 MG tablet therapy pack, Use as directed on package, Disp: 21 tablet, Rfl: 0    Omega-3 Fatty Acids (FISH OIL) 1,000 mg, Take 1,000 mg by mouth 2 (two) times a day , Disp: , Rfl:     oxyCODONE-acetaminophen (PERCOCET) 5-325 mg per tablet, 1 tablet to be taken 3 times daily as needed for leg pain, Disp: 30 tablet, Rfl: 0    PARoxetine (PAXIL) 20 mg tablet, TAKE 1/2 TABLET BY MOUTH DAILY, Disp: 30 tablet, Rfl: 0    psyllium (METAMUCIL) 58 6 % powder, Take 1 packet by mouth daily Indications: 1 tsp , Disp: , Rfl:     rivaroxaban (XARELTO) tablet, Take 20 mg by mouth , Disp: , Rfl:     simvastatin (ZOCOR) 20 mg tablet, Take 1 tablet (20 mg total) by mouth daily at bedtime, Disp: 90 tablet, Rfl: 3    traMADol (ULTRAM) 50 mg tablet, 1 tablet to be taken twice daily as needed for foot and leg pain, Disp: 30 tablet, Rfl: 0         Patient Active Problem List   Diagnosis    Chronic pain disorder    Bilateral lumbar radiculopathy    Lumbar canal stenosis    Acquired ankle/foot deformity    Pain in joints of both feet    Dermatophytosis    Atherosclerosis of arteries of extremities (HCC)    Pain in both feet    Onychomycosis    Neck muscle spasm    Anxiety 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    Benign hypertension with CKD (chronic kidney disease) stage III ([facility] [address] )    Bilateral leg edema    CKD (chronic kidney disease), stage III (HCC)    Microscopic hematuria    Proteinuria    Urinary frequency    Atrial fibrillation (HCC)    Benign prostatic hyperplasia    Congestive heart failure (HCC)    Arteriosclerosis of coronary artery    Allergic rhinitis    Aneurysm of abdominal aorta (HCC)    Angina pectoris (HCC)    Pain in joint involving ankle and foot    Atrial flutter (HCC)    Carpal tunnel syndrome    Chronic gout without tophus    Chronic low back pain    Chronic obstructive pulmonary disease (HCC)    Colon, diverticulosis    Deep venous insufficiency    Degenerative disc disease, lumbar    Difficulty in walking    Fecal soiling    Fibromyalgia    Fistula-in-ano    Hyperlipidemia    Insomnia, persistent    Memory loss    Moderate or severe vision impairment, one eye    Morbid obesity (HCC)    Nicotine dependence    Osteoarthritis of knee    Pacemaker    Sleep apnea    Urinary incontinence    Venous insufficiency (chronic) (peripheral)    BMI 45 0-49 9, adult (HCC)    Carbuncle of neck    Carbuncle of occipital region of scalp    Elevated glucose    Peripheral arteriosclerosis (HCC)    Radiculopathy of lumbar region    Metatarsalgia of both feet          Physical Exam  Left Foot: Appearance: Normal except as noted: excessive pronation-- and-- pes planus  Second toe deformities include hammer toe  Third toe deformities include hammer toe  Forth toe deformities include hammer toe  Fifth toe deformities include hammer toe  Tenderness: None except the great toe,-- distal first metatarsal,-- distal fifth metatarsal-- and-- insertion of the plantar fascia  Positive Brian sign third left intermetatarsal space  ROM: Full  Motor: Normal   Right Foot: Appearance: Normal except as noted: excessive pronation-- and-- pes planus  Second toe deformities include hammer toe  Third toe deformities include hammer toe   Forth toe deformities include hammer toe  Fifth toe deformities include hammer toe  Evaluation of the great toe nail demonstrates an ingrown nail  Tenderness: None except the insertion of the plantar fascia  Left Ankle: Appearance: Normal except erythema,-- swelling-- and-- swelling laterally  Tenderness: None except the tibiotalar joint  Right Ankle: ROM: Full  Neurological Exam: performed  Deep tendon reflexes: + tinel of right tibial nerve  Vascular Exam: performed Dorsalis pedis pulses were present bilaterally  Posterior tibial pulses were present bilaterally  Elevation Pallor: absent bilaterally  Capillary refill time was less than 1 second bilaterally  Edema: moderate bilaterally-- and-- 4/7 pitting  neg  buddy  Toenails: All of the toenails were elongated,-- hypertrophied,-- discolored,-- ingrown,-- shown to have subungual debris,-- tender-- and-- Mycotic with onychauxis  Hyperkeratosis: present on both first toes,-- present on both first sub metatarsals-- and-- present on both fifth sub metatarsals  Shoe Gear Evaluation: performed ()   Recommendation(s): extra depth diabetic shoes

## 2019-02-06 ENCOUNTER — EVALUATION (OUTPATIENT)
Dept: PHYSICAL THERAPY | Facility: CLINIC | Age: 79
End: 2019-02-06
Payer: MEDICARE

## 2019-02-06 DIAGNOSIS — M75.81 ROTATOR CUFF TENDONITIS, RIGHT: ICD-10-CM

## 2019-02-06 DIAGNOSIS — G89.29 CHRONIC RIGHT SHOULDER PAIN: Primary | ICD-10-CM

## 2019-02-06 DIAGNOSIS — M25.511 CHRONIC RIGHT SHOULDER PAIN: Primary | ICD-10-CM

## 2019-02-06 PROCEDURE — 97162 PT EVAL MOD COMPLEX 30 MIN: CPT | Performed by: PHYSICAL THERAPIST

## 2019-02-06 PROCEDURE — 97110 THERAPEUTIC EXERCISES: CPT | Performed by: PHYSICAL THERAPIST

## 2019-02-06 NOTE — PROGRESS NOTES
PT Evaluation     Today's date: 2019  Patient name: Isiah Tyler  : 1940  MRN: [id number]  Referring provider: Perla Infante MD  Dx:   Encounter Diagnosis     ICD-10-CM    1  Chronic right shoulder pain M25 511     G89 29    2  Rotator cuff tendonitis, right M75 81        Start Time: 0845  Stop Time: 930  Total time in clinic (min): 45 minutes    Assessment  Assessment details: Isiah Tyler is a 66 y o  male who presents with pain, decreased strength and decreased ROM  Due to these impairments, patient has difficulty performing ADL's, lifting/carrying, reaching  Patient's clinical presentation is consistent with their referring diagnosis of R RTC tendonitis  Patient has been educated in home exercise program and plan of care   Patient would benefit from skilled physical therapy services to address their aforementioned functional limitations and progress towards prior level of function and independence with home exercise program      Impairments: abnormal or restricted ROM, activity intolerance, impaired physical strength, lacks appropriate home exercise program, pain with function, poor posture  and poor body mechanics  Understanding of Dx/Px/POC: good   Prognosis: good    Goals  Short Term Goals to be accomplished in 3 weeks:  STG1: Pt will be I with HEP  STG2: Pt will be I with posture management   STG3: Pt will demo 50% improvement in shoulder AROM to improve self care and household ADLs   STG4: Pt will demo 1/2 MMT strength in shoulder  STG5: Pt will report pain < 3/10 in shoulder     Long Term Goals to be accomplished in 6 weeks:   LTG1: Pt will demo full shoulder AROM to return to household duties  Celester Quinn: Pt will return to household ADLs pain free as per PLOF  LTG3: Pt will demo shoulder strength [address] details:  HEP development, stretching, strengthening, A/AA/PROM, joint mobilizations, posture education, STM/MI as needed to reduce muscle tension, muscle reeducation, PLOC discussed and agreed upon with patient  Patient would benefit from: PT eval and skilled physical therapy  Planned modality interventions: cryotherapy and thermotherapy: hydrocollator packs  Planned therapy interventions: manual therapy, neuromuscular re-education, self care, therapeutic exercise, therapeutic activities and home exercise program  Frequency: 2x week (1-2x/week)  Duration in weeks: 6  Treatment plan discussed with: patient        Subjective Evaluation    History of Present Illness  Mechanism of injury: Pt reports shoulder pain ongoing for several months  Pt spends majority of his day sitting and playing solitaire  Pt notices pain at worst in morning, primarily stiffness noted and feels he needs to be very careful when removing blankets from his body upon waking in morning due to pain  Pt acknowledges he is below his PLOF at this time pertaining to R shoulder  Pt denies taking pain medicine for shoulder and denies ice  Pt had a cortisone injection early December, felt some relief  Quality of life: good    Pain  Current pain ratin  At best pain ratin  At worst pain ratin    Patient Goals  Patient goals for therapy: increased strength, increased motion and decreased pain          Objective     Postural Observations  Seated posture: poor  Standing posture: poor        Observations     Additional Observation Details  Unremarkable    Neurological Testing     Additional Neurological Details  (-) NT t/o    Active Range of Motion   Left Shoulder   Normal active range of motion    Right Shoulder   Flexion: 150 degrees   Abduction: 150 degrees   External rotation BTH: C2   Internal rotation BTB: sacrum     Strength/Myotome Testing     Right Shoulder     Planes of Motion   Flexion: 3+   Abduction: 3+   External rotation at 0°: 4-   Internal rotation at 0°: 4-           Precautions: Standard  LBP  PACEMAKER      Daily Treatment Diary     Manual  19 Exercise Diary  1            Rows Black 3x10            LPD Blue 3x10            Posture Rev                                                                                                                                                                                                                HEP Edu/inited            Time 13'                Modalities              MHP 10'

## 2019-02-12 ENCOUNTER — APPOINTMENT (OUTPATIENT)
Dept: PHYSICAL THERAPY | Facility: CLINIC | Age: 79
End: 2019-02-12
Payer: MEDICARE

## 2019-02-13 DIAGNOSIS — R60.0 BILATERAL LEG EDEMA: ICD-10-CM

## 2019-02-13 RX ORDER — FUROSEMIDE 40 MG/1
TABLET ORAL
Qty: 30 TABLET | Refills: 2 | Status: SHIPPED | OUTPATIENT
Start: 2019-02-13 | End: 2019-02-26 | Stop reason: SDUPTHER

## 2019-02-14 ENCOUNTER — HOSPITAL ENCOUNTER (OUTPATIENT)
Dept: RADIOLOGY | Facility: HOSPITAL | Age: 79
Setting detail: OUTPATIENT SURGERY
Discharge: HOME | End: 2019-02-14
Payer: MEDICARE

## 2019-02-14 ENCOUNTER — HOSPITAL ENCOUNTER (OUTPATIENT)
Facility: AMBULARY SURGERY CENTER | Age: 79
Setting detail: OUTPATIENT SURGERY
Discharge: HOME | End: 2019-02-14
Attending: ANESTHESIOLOGY | Admitting: ANESTHESIOLOGY
Payer: MEDICARE

## 2019-02-14 VITALS
TEMPERATURE: 97.2 F | DIASTOLIC BLOOD PRESSURE: 69 MMHG | HEART RATE: 66 BPM | RESPIRATION RATE: 20 BRPM | SYSTOLIC BLOOD PRESSURE: 148 MMHG | OXYGEN SATURATION: 94 %

## 2019-02-14 PROCEDURE — 72020 X-RAY EXAM OF SPINE 1 VIEW: CPT

## 2019-02-14 PROCEDURE — 62323 NJX INTERLAMINAR LMBR/SAC: CPT

## 2019-02-14 PROCEDURE — 62323 NJX INTERLAMINAR LMBR/SAC: CPT | Performed by: ANESTHESIOLOGY

## 2019-02-14 RX ORDER — METHYLPREDNISOLONE ACETATE 80 MG/ML
INJECTION, SUSPENSION INTRA-ARTICULAR; INTRALESIONAL; INTRAMUSCULAR; SOFT TISSUE AS NEEDED
Status: DISCONTINUED | OUTPATIENT
Start: 2019-02-14 | End: 2019-02-14 | Stop reason: HOSPADM

## 2019-02-14 RX ORDER — MAGNESIUM HYDROXIDE 1200 MG/15ML
LIQUID ORAL AS NEEDED
Status: DISCONTINUED | OUTPATIENT
Start: 2019-02-14 | End: 2019-02-14 | Stop reason: HOSPADM

## 2019-02-14 RX ORDER — LIDOCAINE WITH 8.4% SOD BICARB 0.9%(10ML)
SYRINGE (ML) INJECTION AS NEEDED
Status: DISCONTINUED | OUTPATIENT
Start: 2019-02-14 | End: 2019-02-14 | Stop reason: HOSPADM

## 2019-02-14 NOTE — DISCHARGE INSTRUCTIONS
Epidural Steroid Injection   WHAT YOU NEED TO KNOW:   An epidural steroid injection (TOOTIE) is a procedure to inject steroid medicine into the epidural space  The epidural space is between your spinal cord and vertebrae  Steroids reduce inflammation and fluid buildup in your spine that may be causing pain  You may be given pain medicine along with the steroids  ACTIVITY  · Do not drive or operate machinery today  · No strenuous activity today - bending, lifting, etc   · You may resume normal activites starting tomorrow - start slowly and as tolerated  · You may shower today, but no tub baths or hot tubs  · You may have numbness for several hours from the local anesthetic  Please use caution and common sense, especially with weight-bearing activities  CARE OF THE INJECTION SITE  · If you have soreness or pain, apply ice to the area today (20 minutes on/20 minutes off)  · Starting tomorrow, you may use warm, moist heat or ice if needed  · You may have an increase or change in your discomfort for 36-48 hours after your treatment  · Apply ice and continue with any pain medication you have been prescribed  · Notify [facility] if you have any of the following: redness, drainage, swelling, headache, stiff neck or fever above 100°F     SPECIAL INSTRUCTIONS  · Our office will contact you in approximately 7 days for a progress report  MEDICATIONS  · Continue to take all routine medications  · Our office may have instructed you to hold some medications  If you have a problem specifically related to your procedure, please call our office at [phone]  Problems not related to your procedure should be directed to your primary care physician

## 2019-02-14 NOTE — OP NOTE
ATTENDING PHYSICIAN:  Sun Willis MD     PROCEDURE:  Lumbar interlaminar midline epidural steroid injection with steroid and local anesthetic under fluoroscopy at the L4-L5 level  PREPROCEDURE DIAGNOSIS:  Low back pain and lower extremity radicular symptoms  POSTPROCEDURE DIAGNOSIS:  Low back pain and lower extremity radicular symptoms  ANESTHESIA:  Local     ESTIMATED BLOOD LOSS:  Minimal     COMPLICATIONS:  None  LOCATION:  [address]  CONSENT:  Today's procedure, its potential benefits as well as its risks and potential side effects were reviewed  Discussed risks of the procedure including bleeding, infection, nerve irritation or damage, reactions to the medications, headache, failure of the pain to improve, and potential worsening of the pain were explained to the patient who verbalized understanding and who wished to proceed  Written informed consent was thereby obtained  DESCRIPTION OF THE PROCEDURE:  After written informed consent was obtained, the patient was taken to the fluoroscopy suite and placed in the prone position  Anatomical landmarks were identified by way of fluoroscopy in multiple views  The skin of the lumbar region was prepped and draped in the usual sterile fashion  Strict aseptic technique was utilized  The skin and subcutaneous tissues at the needle entry site were infiltrated with 3 mL of 1% preservative-free lidocaine using a 25-gauge 1-1/2-inch needle  A 20-gauge Tuohy needle was then incrementally advanced under fluoroscopy using a loss of resistance technique  Upon entering into the epidural space, a positive loss of resistance to air was noted and a characteristic "pop" was felt   Proper placement into the epidural space was confirmed with fluoroscopy in multiple views and by continued loss of resistance after injection of 1 mL of sterile preservative-free normal saline as well as the administration of contrast to delineate the epidural space  There were no paresthesias reported  After negative aspiration for CSF or heme, a 6 mL injectate consisting of 1 mL of Depo-Medrol 80 mg/mL and 1 mL of Depo-Medrol 40 mg/mL mixed with 4 mL of preservative-free normal saline was slowly injected  The patient tolerated the procedure well and all needles were removed with the tips intact  Hemostasis was maintained  There were no apparent paresthesias or complications  The skin was wiped clean and a Band-Aid was placed as appropriate  The patient was monitored for an appropriate period of time following the procedure and remained hemodynamically stable and neurovascularly intact following the procedure  The patient was ultimately discharged to home with supervision in good condition and instructed to call the office in a few days for an update or sooner as warranted  I was present and participated in all key and critical portions of this procedure      Vincent Klinefelter, MD  2/14/2019  2:04 PM

## 2019-02-15 ENCOUNTER — OFFICE VISIT (OUTPATIENT)
Dept: PHYSICAL THERAPY | Facility: CLINIC | Age: 79
End: 2019-02-15
Payer: MEDICARE

## 2019-02-15 DIAGNOSIS — M75.81 ROTATOR CUFF TENDONITIS, RIGHT: Primary | ICD-10-CM

## 2019-02-15 DIAGNOSIS — G89.29 CHRONIC RIGHT SHOULDER PAIN: ICD-10-CM

## 2019-02-15 DIAGNOSIS — M25.511 CHRONIC RIGHT SHOULDER PAIN: ICD-10-CM

## 2019-02-15 PROCEDURE — 97110 THERAPEUTIC EXERCISES: CPT | Performed by: PHYSICAL THERAPIST

## 2019-02-15 NOTE — PROGRESS NOTES
Daily Note     Today's date: 2/15/2019  Patient name: Natali Nelson  : 1940  MRN: [id number]  Referring provider: Daron Ceron MD  Dx:   Encounter Diagnosis     ICD-10-CM    1  Rotator cuff tendonitis, right M75 81    2  Chronic right shoulder pain M25 511     G89 29        Start Time: 1145  Stop Time: 1215  Total time in clinic (min): 30 minutes    Subjective: Pt reports he saw MD this morning and "got a shot" and "now everything else feels great!"      Objective: See treatment diary below  Precautions: Standard  LBP  PACEMAKER  Daily Treatment Diary     Manual  2/6/19 2/15/19                                                                                Exercise Diary  1 2           Rows Black 3x10 3x10           LPD Blue 3x10 3x10           Posture Rev  Rev           B shldr horiz abd  Black 3x10           D2 PNF resistance  Green 3x10                                                                                                                                                                                    HEP Edu/inited Rev           Time 13' 30'               Modalities              MHP 10'                                                Assessment: Tolerated treatment well  Patient demo good ROm and strenth in R shoulder and demo good technique with HEP  Plan: Continue per plan of care  Hold PT 1 week, will f/u next week to confirm DC planning if approprpiate

## 2019-02-18 ENCOUNTER — TELEPHONE (OUTPATIENT)
Dept: PHYSICAL THERAPY | Facility: CLINIC | Age: 79
End: 2019-02-18

## 2019-02-18 NOTE — TELEPHONE ENCOUNTER
As per last appt, pt was contacted this morning for f/u   Pt reports "I'm feeling really good and I do my exercises every day " discussed holding PT this week, will call pt again next week to confirm no symptom return, "I hope this lasts!"

## 2019-02-19 ENCOUNTER — APPOINTMENT (OUTPATIENT)
Dept: PHYSICAL THERAPY | Facility: CLINIC | Age: 79
End: 2019-02-19
Payer: MEDICARE

## 2019-02-21 ENCOUNTER — TELEPHONE (OUTPATIENT)
Dept: PAIN MEDICINE | Facility: CLINIC | Age: 79
End: 2019-02-21

## 2019-02-25 DIAGNOSIS — M54.16 RADICULOPATHY OF LUMBAR REGION: ICD-10-CM

## 2019-02-25 DIAGNOSIS — I70.209 PERIPHERAL ARTERIOSCLEROSIS (HCC): ICD-10-CM

## 2019-02-25 RX ORDER — GABAPENTIN 800 MG/1
800 TABLET ORAL 3 TIMES DAILY
Qty: 90 TABLET | Refills: 0 | Status: SHIPPED | OUTPATIENT
Start: 2019-02-25 | End: 2019-03-29 | Stop reason: SDUPTHER

## 2019-02-26 ENCOUNTER — OFFICE VISIT (OUTPATIENT)
Dept: NEPHROLOGY | Facility: CLINIC | Age: 79
End: 2019-02-26
Payer: MEDICARE

## 2019-02-26 VITALS
BODY MASS INDEX: 41.75 KG/M2 | SYSTOLIC BLOOD PRESSURE: 124 MMHG | DIASTOLIC BLOOD PRESSURE: 70 MMHG | HEIGHT: 73 IN | WEIGHT: 315 LBS

## 2019-02-26 DIAGNOSIS — I12.9 BENIGN HYPERTENSION WITH CKD (CHRONIC KIDNEY DISEASE) STAGE III (HCC): ICD-10-CM

## 2019-02-26 DIAGNOSIS — R60.0 BILATERAL LEG EDEMA: ICD-10-CM

## 2019-02-26 DIAGNOSIS — N18.30 BENIGN HYPERTENSION WITH CKD (CHRONIC KIDNEY DISEASE) STAGE III (HCC): ICD-10-CM

## 2019-02-26 DIAGNOSIS — N18.30 CKD (CHRONIC KIDNEY DISEASE), STAGE III (HCC): Primary | ICD-10-CM

## 2019-02-26 DIAGNOSIS — R80.1 PERSISTENT PROTEINURIA: ICD-10-CM

## 2019-02-26 PROCEDURE — 99214 OFFICE O/P EST MOD 30 MIN: CPT | Performed by: INTERNAL MEDICINE

## 2019-02-26 RX ORDER — FUROSEMIDE 40 MG/1
40 TABLET ORAL DAILY
Qty: 90 TABLET | Refills: 3 | Status: ON HOLD | OUTPATIENT
Start: 2019-02-26 | End: 2019-04-13 | Stop reason: SDUPTHER

## 2019-02-26 RX ORDER — OXYCODONE HYDROCHLORIDE AND ACETAMINOPHEN 5; 325 MG/1; MG/1
1 TABLET ORAL AS NEEDED
COMMUNITY
End: 2019-06-23 | Stop reason: ALTCHOICE

## 2019-02-26 RX ORDER — TRAMADOL HYDROCHLORIDE 50 MG/1
50 TABLET ORAL EVERY 6 HOURS PRN
COMMUNITY
End: 2021-02-16

## 2019-02-26 NOTE — PROGRESS NOTES
NEPHROLOGY OUTPATIENT PROGRESS NOTE   Natali Nelson 66 y o  male MRN: [id number]  DATE: 2/26/2019  Reason for visit:   Chief Complaint   Patient presents with    Follow-up    Chronic Kidney Disease     ASSESSMENT and PLAN:  CKD stage III, baseline serum creatinine 1 2-1 5   - Last serum creatinine 1 3 in September 2018 overall stable and at baseline   He has not done any recent labs since last visit  - urinalysis in September 2018 showed 1+ leukocyte esterase, 1+ proteinuria, few bacteria, 0 to 2 RBCs, 11 to 30 WBCs  Since then patient was treated for UTI by PCP  - CKD likely secondary to long-term hypertension causing hypertensive nephrosclerosis, morbid obesity   - Avoid nephrotoxins or NSAIDs  -will do repeat urinalysis with microscopic, UPC ratio, BMP now and BMP again before next visit     Proteinuria, last UPC ratio 214 mg   - repeat urinalysi and UPC ratio as mentioned above  -Could be secondary to underlying hypertension +/- obesity related secondary FSGS component   - Consider ACE inhibitor if renal function remains stable  History of gross hematuria  -patient has urology appointment later today  Denies any recent episodes of gross hematuria      Hypertension  - blood pressure controlled in the office today  - Salt restricted diet   - continue felodipine  Start Lasix as below      Leg edema, seem to have slightly worsened along with continue weight gain since last visit  -he does have occasional exertional dyspnea although stable overall  With overall weight gain and leg edema, will start patient on Lasix 40 mg p o  Daily which he has been taking on p r n  Basis  -continue to monitor daily weight and call back if he has continuous weight gain greater than 5 lb in 2 to 3 days    If no further improvement in weight or leg edema/dyspnea, advised him to call back      Vitamin-D 25 hydroxy level 36 previously at goal , PTH 21 in March 2018      Morbid obesity,  patient was evaluated by Bariatric Medicine and undergoing conservative management for now  Diagnoses and all orders for this visit:    CKD (chronic kidney disease), stage III ([facility] [address] )  -     Basic metabolic panel; Future  -     CBC; Future  -     Urinalysis with reflex to microscopic; Future  -     Protein / creatinine ratio, urine; Future  -     Phosphorus; Future  -     PTH, intact; Future  -     Uric acid; Future  -     Magnesium; Future  -     Basic metabolic panel  -     CBC  -     Urinalysis with reflex to microscopic  -     Protein / creatinine ratio, urine  -     Phosphorus  -     PTH, intact  -     Uric acid  -     Magnesium  -     Basic metabolic panel; Future  -     Magnesium; Future  -     Phosphorus; Future  -     Protein / creatinine ratio, urine; Future  -     Uric acid; Future  -     Basic metabolic panel  -     Magnesium  -     Phosphorus  -     Protein / creatinine ratio, urine  -     Uric acid    Benign hypertension with CKD (chronic kidney disease) stage III ([facility])    Bilateral leg edema  -     furosemide (LASIX) 40 mg tablet; Take 1 tablet (40 mg total) by mouth daily    Persistent proteinuria  -     Urinalysis with reflex to microscopic; Future  -     Protein / creatinine ratio, urine; Future  -     Urinalysis with reflex to microscopic  -     Protein / creatinine ratio, urine  -     Protein / creatinine ratio, urine; Future  -     Protein / creatinine ratio, urine    Other orders  -     traMADol (ULTRAM) 50 mg tablet; Take 50 mg by mouth every 6 (six) hours as needed for moderate pain  -     oxyCODONE-acetaminophen (PERCOCET) 5-325 mg per tablet; Take 1 tablet by mouth as needed for moderate pain          SUBJECTIVE / HPI:  Patient is 51-year-old male with significant medical issues of hypertension for many years, CAD, status post PTCA, status post pacemaker placement, morbidly obese, CK D stage III with baseline creatinine in the 1 2-1 5,, gout, comes for regular follow-up of CKD    Patient has not done any new recent labs since last visit  Last serum creatinine 1 3 overall at baseline  previous urinalysis showed some signs of infection and was treated for UTI by PCP since then  He denies any urinary complaint at this time  He continues to gain weight  He was evaluated by Bariatric Medicine December 2018 and undergoing conservative management  He also has persistent leg edema and occasionally has exertional dyspnea  He uses Lasix on p r n  Basis  Patient is unable to do exercise due to back pain/knee pain issues  Recently patient received epidural steroid injections in February 2019 and since then his back pain is overall much improved  No recent gout attack  No recent NSAID exposure  REVIEW OF SYSTEMS:  More than 10 point review of systems were obtained and discussed in length with the patient  Complete review of systems were negative / unremarkable except mentioned above  PHYSICAL EXAM:  Vitals:    02/26/19 0827 02/26/19 0905   BP:  124/70   Weight: (!) 174 kg (383 lb)    Height: 6' 1" (1 854 m)      Body mass index is 50 53 kg/m²  Physical Exam   Constitutional: He is oriented to person, place, and time  He appears well-nourished  Morbidly obese   HENT:   Head: Normocephalic and atraumatic  Right Ear: External ear normal    Left Ear: External ear normal    Nose: Nose normal    Eyes: Pupils are equal, round, and reactive to light  Conjunctivae and EOM are normal  No scleral icterus  Neck: Neck supple  No JVD present  Cardiovascular: Normal rate and normal heart sounds  Pulmonary/Chest: Effort normal and breath sounds normal  No respiratory distress  He has no wheezes  He has no rales  Abdominal: Soft  Bowel sounds are normal  He exhibits no distension  There is no tenderness  Musculoskeletal: He exhibits edema (1+ edema in both legs)  He exhibits no tenderness  Neurological: He is alert and oriented to person, place, and time  Skin: Skin is warm and dry     Psychiatric: He has a normal mood and affect  His behavior is normal    Vitals reviewed  PAST MEDICAL HISTORY:  Past Medical History:   Diagnosis Date    Arthritis     At[facility] flutter ([facility] [address] )     Chronic kidney disease     stage 3    Coronary artery disease     2 stents    Fluid retention     Gout     Heart failure ([facility] [address] )     pacemaker    Hypertension     Pacemaker     Pulmonary emphysema ([facility] [address] )     Radiculopathy     last assessed 1/28/16     Shortness of breath     exertion    Sleep apnea     c pap       PAST SURGICAL HISTORY:  Past Surgical History:   Procedure Laterality Date    ANGIOPLASTY      x2 2 stents and then replaced    CARDIAC PACEMAKER PLACEMENT      pacemaker permanent placement dual chamber / last assessed 4/7/14 / implantation     CARDIAC SURGERY      pacemaker    CHOLECYSTECTOMY      CORONARY ANGIOPLASTY WITH STENT PLACEMENT      EPIDURAL BLOCK INJECTION N/A 5/26/2016    Procedure: BLOCK / INJECTION EPIDURAL STEROID LUMBAR  L4-5;  Surgeon: Vincent Klinefelter, MD;  Location: [facility] MAIN OR;  Service:     EPIDURAL BLOCK INJECTION N/A 2/14/2019    Procedure: L4 L5 Lumbar Epidural Steroid Injection;  Surgeon: Vincent Klinefelter, MD;  Location: [facility] MAIN OR;  Service: Pain Management     EYE SURGERY      cataract left    KNEE ARTHROSCOPY W/ MENISCAL REPAIR Left     LUMBAR EPIDURAL INJECTION N/A 3/17/2016    Procedure: BLOCK / INJECTION LUMBAR  L4-5  (C-[facility]);   Surgeon: Vincent Klinefelter, MD;  Location: [facility] MAIN OR;  Service:        SOCIAL HISTORY:  Social History     Substance and Sexual Activity   Alcohol Use No     Social History     Substance and Sexual Activity   Drug Use No     Social History     Tobacco Use   Smoking Status Current Some Day Smoker    Packs/day: 1 00    Years: 50 00    Pack years: 50 00    Types: Cigarettes   Smokeless Tobacco Never Used   Tobacco Comment    reports occasional cigar or cigarette on weekend       FAMILY HISTORY:  Family History   Problem Relation Age of Onset    Cancer Mother 80    Heart disease Mother     Hypertension Mother     Heart disease Father     Heart disease Brother     Diabetes Neg Hx     Stroke Neg Hx        MEDICATIONS:    Current Outpatient Medications:     albuterol (VENTOLIN HFA) 90 mcg/act inhaler, Inhale 2 puffs every 6 (six) hours as needed for wheezing, Disp: 1 Inhaler, Rfl: 6    allopurinol (ZYLOPRIM) 100 mg tablet, Take 1 tablet (100 mg total) by mouth daily, Disp: 90 tablet, Rfl: 3    ascorbic acid (VITAMIN C) 500 mg tablet, Take 500 mg by mouth daily  , Disp: , Rfl:     B Complex Vitamins (B COMPLEX 1 PO), Take 1 tablet by mouth daily  , Disp: , Rfl:     Biotin (BIOTIN 5000) 5 MG CAPS, Take 1,000 mcg by mouth daily  , Disp: , Rfl:     calcium carbonate-vitamin D (OSCAL-D) 500 mg-200 units per tablet, Take 2 tablets by mouth daily at bedtime  , Disp: , Rfl:     clopidogrel (PLAVIX) 75 mg tablet, TAKE 1 TABLET DAILY, Disp: 90 tablet, Rfl: 3    collagenase (SANTYL) ointment, Apply 1 application topically daily  , Disp: , Rfl:     Cranberry 1000 MG CAPS, Take 1 tablet by mouth 2 (two) times a day , Disp: , Rfl:     cyanocobalamin 1000 MCG tablet, Take 100 mcg by mouth daily, Disp: , Rfl:     felodipine (PLENDIL) 5 mg 24 hr tablet, TAKE 1 TABLET DAILY, Disp: 90 tablet, Rfl: 3    finasteride (PROSCAR) 5 mg tablet, Take 1 tablet (5 mg total) by mouth daily, Disp: 90 tablet, Rfl: 3    Flaxseed, Linseed, (EQL FLAX SEED OIL) 1000 MG CAPS, Take by mouth daily  , Disp: , Rfl:     fluticasone-salmeterol (ADVAIR) 250-50 mcg/dose inhaler, Inhale 1 puff 2 (two) times a day Rinse mouth after use , Disp: 1 Inhaler, Rfl: 6    furosemide (LASIX) 40 mg tablet, Take 1 tablet (40 mg total) by mouth daily, Disp: 90 tablet, Rfl: 3    gabapentin (NEURONTIN) 800 mg tablet, Take 1 tablet (800 mg total) by mouth 3 (three) times a day for 30 days, Disp: 90 tablet, Rfl: 0    glucosamine-chondroitin 500-400 MG tablet, Take 1 tablet by mouth 2 (two) times a day , Disp: , Rfl:    hydrOXYzine HCL (ATARAX) 25 mg tablet, Take 1 tablet (25 mg total) by mouth 3 (three) times a day for 3 days, Disp: 9 tablet, Rfl: 0    lactase (LACTAID) 3,000 units tablet, Take 3,000 Units by mouth as needed  , Disp: , Rfl:     Omega-3 Fatty Acids (FISH OIL) 1,000 mg, Take 1,000 mg by mouth 2 (two) times a day , Disp: , Rfl:     oxyCODONE-acetaminophen (PERCOCET) 5-325 mg per tablet, Take 1 tablet by mouth as needed for moderate pain, Disp: , Rfl:     PARoxetine (PAXIL) 20 mg tablet, TAKE 1/2 TABLET BY MOUTH DAILY, Disp: 30 tablet, Rfl: 0    psyllium (METAMUCIL) 58 6 % powder, Take 1 packet by mouth daily Indications: 1 tsp , Disp: , Rfl:     rivaroxaban (XARELTO) tablet, Take 20 mg by mouth , Disp: , Rfl:     simvastatin (ZOCOR) 20 mg tablet, Take 1 tablet (20 mg total) by mouth daily at bedtime, Disp: 90 tablet, Rfl: 3    traMADol (ULTRAM) 50 mg tablet, Take 50 mg by mouth every 6 (six) hours as needed for moderate pain, Disp: , Rfl:     ciclopirox (LOPROX) 0 77 % cream, Apply topically 2 (two) times a day for 20 days, Disp: 45 g, Rfl: 2    fluocinonide (LIDEX) 0 05 % cream, Apply topically 2 (two) times a day for 30 days, Disp: 30 g, Rfl: 2    Lab Results:   Results for orders placed or performed in visit on 41/51/23   Basic metabolic panel   Result Value Ref Range    Glucose, Random 147 (H) 65 - 99 mg/dL    BUN 17 8 - 27 mg/dL    Creatinine 1 32 (H) 0 76 - 1 27 mg/dL    eGFR Non  51 (L) >59 mL/min/1 73    eGFR  59 (L) >59 mL/min/1 73    SL AMB BUN/CREATININE RATIO 13 10 - 24    Sodium 138 134 - 144 mmol/L    Potassium 4 7 3 5 - 5 2 mmol/L    Chloride 102 96 - 106 mmol/L    CO2 21 20 - 29 mmol/L    CALCIUM 9 7 8 6 - 10 2 mg/dL   Phosphorus   Result Value Ref Range    Phosphorus, Serum 3 4 2 5 - 4 5 mg/dL   Magnesium   Result Value Ref Range    Magnesium, Serum 2 1 1 6 - 2 3 mg/dL   Protein / creatinine ratio, urine   Result Value Ref Range    Creatinine, Urine 224 2 Not Estab  mg/dL    Total Protein, Urine 48 0 Not Estab  mg/dL    Prot/Creat Ratio, Ur 214 (H) 0 - 200 mg/g creat   Urinalysis with reflex to microscopic   Result Value Ref Range    Specific Gravity 1 022 1 005 - 1 030    Ph 5 5 5 0 - 7 5    Color UA Yellow Yellow    Urine Appearance Clear Clear    Leukocyte Esterase 1+ (A) Negative    Protein 1+ (A) Negative/Trace    Glucose, 24 HR Urine Negative Negative    Ketone, Urine Negative Negative    Blood, Urine Negative Negative    Bilirubin, Urine Negative Negative    Urobilinogen Urine 0 2 0 2 - 1 0 EU/dL    SL AMB NITRITES URINE, QUAL  Negative Negative    Microscopic Examination See below:    CBC   Result Value Ref Range    White Blood Cell Count 9 8 3 4 - 10 8 x10E3/uL    Red Blood Cell Count 5 12 4 14 - 5 80 x10E6/uL    Hemoglobin 16 1 13 0 - 17 7 g/dL    HCT 47 3 37 5 - 51 0 %    MCV 92 79 - 97 fL    MCH 31 4 26 6 - 33 0 pg    MCHC 34 0 31 5 - 35 7 g/dL    RDW 14 2 12 3 - 15 4 %    Platelet Count 274 777 - 379 x10E3/uL   Microscopic Examination   Result Value Ref Range    SL AMB WBC, URINE 11-30 (A) 0 - 5 /hpf    RBC, Urine 0-2 0 - 2 /hpf    Epithelial Cells (non renal) 0-10 0 - 10 /hpf    MUCUS THREADS Present Not Estab      Bacteria, Urine Few None seen/Few

## 2019-02-28 ENCOUNTER — APPOINTMENT (OUTPATIENT)
Dept: PHYSICAL THERAPY | Facility: CLINIC | Age: 79
End: 2019-02-28
Payer: MEDICARE

## 2019-02-28 DIAGNOSIS — F06.4 ANXIETY DISORDER DUE TO MEDICAL CONDITION: ICD-10-CM

## 2019-02-28 RX ORDER — PAROXETINE HYDROCHLORIDE 20 MG/1
TABLET, FILM COATED ORAL
Qty: 30 TABLET | Refills: 6 | Status: SHIPPED | OUTPATIENT
Start: 2019-02-28 | End: 2019-04-13 | Stop reason: HOSPADM

## 2019-03-08 LAB
APPEARANCE UR: CLEAR
BACTERIA URNS QL MICRO: ABNORMAL
BILIRUB UR QL STRIP: NEGATIVE
BUN SERPL-MCNC: 23 MG/DL (ref 8–27)
BUN/CREAT SERPL: 16 (ref 10–24)
CALCIUM SERPL-MCNC: 10.1 MG/DL (ref 8.6–10.2)
CHLORIDE SERPL-SCNC: 101 MMOL/L (ref 96–106)
CO2 SERPL-SCNC: 28 MMOL/L (ref 20–29)
COLOR UR: YELLOW
CREAT SERPL-MCNC: 1.45 MG/DL (ref 0.76–1.27)
CREAT UR-MCNC: 183.5 MG/DL
CRYSTALS URNS MICRO: ABNORMAL
EPI CELLS #/AREA URNS HPF: ABNORMAL /HPF (ref 0–10)
ERYTHROCYTE [DISTWIDTH] IN BLOOD BY AUTOMATED COUNT: 14.3 % (ref 12.3–15.4)
GLUCOSE SERPL-MCNC: 156 MG/DL (ref 65–99)
GLUCOSE UR QL: NEGATIVE
HCT VFR BLD AUTO: 45.5 % (ref 37.5–51)
HGB BLD-MCNC: 15.2 G/DL (ref 13–17.7)
HGB UR QL STRIP: NEGATIVE
KETONES UR QL STRIP: NEGATIVE
LEUKOCYTE ESTERASE UR QL STRIP: ABNORMAL
MAGNESIUM SERPL-MCNC: 2.2 MG/DL (ref 1.6–2.3)
MCH RBC QN AUTO: 30.8 PG (ref 26.6–33)
MCHC RBC AUTO-ENTMCNC: 33.4 G/DL (ref 31.5–35.7)
MCV RBC AUTO: 92 FL (ref 79–97)
MICRO URNS: ABNORMAL
MUCOUS THREADS URNS QL MICRO: PRESENT
NITRITE UR QL STRIP: NEGATIVE
PH UR STRIP: 6 [PH] (ref 5–7.5)
PHOSPHATE SERPL-MCNC: 3.5 MG/DL (ref 2.5–4.5)
PLATELET # BLD AUTO: 159 X10E3/UL (ref 150–379)
POTASSIUM SERPL-SCNC: 4.8 MMOL/L (ref 3.5–5.2)
PROT UR QL STRIP: ABNORMAL
PROT UR-MCNC: 32.8 MG/DL
PROT/CREAT UR: 179 MG/G CREAT (ref 0–200)
PTH-INTACT SERPL-MCNC: 25 PG/ML (ref 15–65)
RBC # BLD AUTO: 4.93 X10E6/UL (ref 4.14–5.8)
RBC #/AREA URNS HPF: ABNORMAL /HPF (ref 0–2)
SL AMB EGFR AFRICAN AMERICAN: 53 ML/MIN/1.73
SL AMB EGFR NON AFRICAN AMERICAN: 46 ML/MIN/1.73
SODIUM SERPL-SCNC: 145 MMOL/L (ref 134–144)
SP GR UR: 1.02 (ref 1–1.03)
UNIDENT CRYS URNS QL MICRO: PRESENT
URATE SERPL-MCNC: 9 MG/DL (ref 3.7–8.6)
UROBILINOGEN UR STRIP-ACNC: 0.2 EU/DL (ref 0.2–1)
WBC # BLD AUTO: 9 X10E3/UL (ref 3.4–10.8)
WBC #/AREA URNS HPF: ABNORMAL /HPF (ref 0–5)

## 2019-03-11 ENCOUNTER — TELEPHONE (OUTPATIENT)
Dept: OTHER | Facility: HOSPITAL | Age: 79
End: 2019-03-11

## 2019-03-11 DIAGNOSIS — E79.0 HYPERURICEMIA: ICD-10-CM

## 2019-03-11 RX ORDER — ALLOPURINOL 300 MG/1
300 TABLET ORAL DAILY
Qty: 90 TABLET | Refills: 3 | Status: ON HOLD | OUTPATIENT
Start: 2019-03-11 | End: 2019-04-13 | Stop reason: SDUPTHER

## 2019-03-11 NOTE — TELEPHONE ENCOUNTER
Can let him know that his serum creatinine stable at baseline  His uric acid level remains significantly elevated  I would like him to increase allopurinol from 100 mg to 300 mg p o  Daily  I have sent refill to [facility] for allopurinol 300 mg p o  Daily  He should also drink more free water as his serum sodium is borderline elevated at 145  UA shows some WBCs  Does patient have fever, chills, lower abdominal pain, urinary symptoms like dysuria, urinary frequency/urgency, foul smell in urine, cloudy urine, etc ? If has no symptoms, will monitor without any need for antibiotics

## 2019-03-11 NOTE — TELEPHONE ENCOUNTER
Spoke with the patient's wife and she is aware of his lab test results and to increase his allopurinol from 100 mg daily to 300 mg daily and she is aware to have him drink more water and she states that the patient does not have any UTI symptoms at this time

## 2019-03-29 DIAGNOSIS — M54.16 RADICULOPATHY OF LUMBAR REGION: ICD-10-CM

## 2019-03-29 DIAGNOSIS — I70.209 PERIPHERAL ARTERIOSCLEROSIS (HCC): ICD-10-CM

## 2019-03-29 RX ORDER — GABAPENTIN 800 MG/1
800 TABLET ORAL 3 TIMES DAILY
Qty: 270 TABLET | Refills: 0 | Status: SHIPPED | OUTPATIENT
Start: 2019-03-29 | End: 2019-05-16 | Stop reason: SDUPTHER

## 2019-04-01 DIAGNOSIS — R26.2 DIFFICULTY WALKING: Primary | ICD-10-CM

## 2019-04-08 ENCOUNTER — HOSPITAL ENCOUNTER (INPATIENT)
Facility: HOSPITAL | Age: 79
LOS: 4 days | Discharge: NURSING, REHAB OR LONG-TERM CARE | DRG: 177 | End: 2019-04-13
Attending: EMERGENCY MEDICINE | Admitting: FAMILY MEDICINE
Payer: MEDICARE

## 2019-04-08 ENCOUNTER — APPOINTMENT (OUTPATIENT)
Dept: NON INVASIVE DIAGNOSTICS | Facility: HOSPITAL | Age: 79
DRG: 177 | End: 2019-04-08
Payer: MEDICARE

## 2019-04-08 ENCOUNTER — APPOINTMENT (EMERGENCY)
Dept: RADIOLOGY | Facility: HOSPITAL | Age: 79
DRG: 177 | End: 2019-04-08
Attending: EMERGENCY MEDICINE
Payer: MEDICARE

## 2019-04-08 DIAGNOSIS — N17.9 ACUTE KIDNEY INJURY SUPERIMPOSED ON CHRONIC KIDNEY DISEASE (HCC): ICD-10-CM

## 2019-04-08 DIAGNOSIS — J44.1 COPD EXACERBATION (HCC): Primary | ICD-10-CM

## 2019-04-08 DIAGNOSIS — R60.0 BILATERAL LEG EDEMA: ICD-10-CM

## 2019-04-08 DIAGNOSIS — J18.9 RIGHT UPPER LOBE PNEUMONIA: ICD-10-CM

## 2019-04-08 DIAGNOSIS — I50.9 CONGESTIVE HEART FAILURE (HCC): ICD-10-CM

## 2019-04-08 DIAGNOSIS — R06.00 DOE (DYSPNEA ON EXERTION): ICD-10-CM

## 2019-04-08 DIAGNOSIS — R07.9 CHEST PAIN: ICD-10-CM

## 2019-04-08 DIAGNOSIS — E79.0 HYPERURICEMIA: ICD-10-CM

## 2019-04-08 DIAGNOSIS — N18.9 ACUTE KIDNEY INJURY SUPERIMPOSED ON CHRONIC KIDNEY DISEASE (HCC): ICD-10-CM

## 2019-04-08 DIAGNOSIS — Z72.0 TOBACCO USE: ICD-10-CM

## 2019-04-08 DIAGNOSIS — I48.20 CHRONIC A-FIB (HCC): ICD-10-CM

## 2019-04-08 PROBLEM — E87.1 HYPONATREMIA: Status: ACTIVE | Noted: 2019-04-08

## 2019-04-08 PROBLEM — I10 ESSENTIAL HYPERTENSION: Status: ACTIVE | Noted: 2019-04-08

## 2019-04-08 PROBLEM — D69.6 THROMBOCYTOPENIA (HCC): Status: ACTIVE | Noted: 2019-04-08

## 2019-04-08 PROBLEM — R17 SERUM TOTAL BILIRUBIN ELEVATED: Status: ACTIVE | Noted: 2019-04-08

## 2019-04-08 PROBLEM — D72.829 LEUKOCYTOSIS: Status: ACTIVE | Noted: 2019-04-08

## 2019-04-08 LAB
ALBUMIN SERPL BCP-MCNC: 2.9 G/DL (ref 3.5–5)
ALP SERPL-CCNC: 53 U/L (ref 46–116)
ALT SERPL W P-5'-P-CCNC: 22 U/L (ref 12–78)
ANION GAP SERPL CALCULATED.3IONS-SCNC: 6 MMOL/L (ref 4–13)
APTT PPP: 36 SECONDS (ref 24–33)
AST SERPL W P-5'-P-CCNC: 27 U/L (ref 5–45)
ATRIAL RATE: 202 BPM
BACTERIA UR QL AUTO: ABNORMAL /HPF
BASOPHILS # BLD AUTO: 0.07 THOUSANDS/ΜL (ref 0–0.1)
BASOPHILS NFR BLD AUTO: 1 % (ref 0–1)
BILIRUB SERPL-MCNC: 1.1 MG/DL (ref 0.2–1)
BILIRUB UR QL STRIP: NEGATIVE
BUN SERPL-MCNC: 20 MG/DL (ref 5–25)
CALCIUM SERPL-MCNC: 9.4 MG/DL (ref 8.3–10.1)
CHLORIDE SERPL-SCNC: 101 MMOL/L (ref 100–108)
CLARITY UR: ABNORMAL
CO2 SERPL-SCNC: 28 MMOL/L (ref 21–32)
COLOR UR: YELLOW
CREAT SERPL-MCNC: 1.54 MG/DL (ref 0.6–1.3)
EOSINOPHIL # BLD AUTO: 0.22 THOUSAND/ΜL (ref 0–0.61)
EOSINOPHIL NFR BLD AUTO: 2 % (ref 0–6)
ERYTHROCYTE [DISTWIDTH] IN BLOOD BY AUTOMATED COUNT: 13.6 % (ref 11.6–15.1)
GFR SERPL CREATININE-BSD FRML MDRD: 43 ML/MIN/1.73SQ M
GLUCOSE SERPL-MCNC: 176 MG/DL (ref 65–140)
GLUCOSE UR STRIP-MCNC: NEGATIVE MG/DL
HCT VFR BLD AUTO: 43.1 % (ref 36.5–49.3)
HGB BLD-MCNC: 13.9 G/DL (ref 12–17)
HGB UR QL STRIP.AUTO: ABNORMAL
IMM GRANULOCYTES # BLD AUTO: 0.06 THOUSAND/UL (ref 0–0.2)
IMM GRANULOCYTES NFR BLD AUTO: 1 % (ref 0–2)
INR PPP: 1.22 (ref 0.86–1.16)
KETONES UR STRIP-MCNC: NEGATIVE MG/DL
L PNEUMO1 AG UR QL IA.RAPID: NEGATIVE
LACTATE SERPL-SCNC: 1.6 MMOL/L (ref 0.5–2)
LEUKOCYTE ESTERASE UR QL STRIP: ABNORMAL
LYMPHOCYTES # BLD AUTO: 1.6 THOUSANDS/ΜL (ref 0.6–4.47)
LYMPHOCYTES NFR BLD AUTO: 13 % (ref 14–44)
MCH RBC QN AUTO: 30.8 PG (ref 26.8–34.3)
MCHC RBC AUTO-ENTMCNC: 32.3 G/DL (ref 31.4–37.4)
MCV RBC AUTO: 96 FL (ref 82–98)
MONOCYTES # BLD AUTO: 1.4 THOUSAND/ΜL (ref 0.17–1.22)
MONOCYTES NFR BLD AUTO: 11 % (ref 4–12)
NEUTROPHILS # BLD AUTO: 8.88 THOUSANDS/ΜL (ref 1.85–7.62)
NEUTS SEG NFR BLD AUTO: 72 % (ref 43–75)
NITRITE UR QL STRIP: NEGATIVE
NON-SQ EPI CELLS URNS QL MICRO: ABNORMAL /HPF
NRBC BLD AUTO-RTO: 0 /100 WBCS
NT-PROBNP SERPL-MCNC: 1233 PG/ML
PH UR STRIP.AUTO: 5.5 [PH]
PLATELET # BLD AUTO: 144 THOUSANDS/UL (ref 149–390)
PMV BLD AUTO: 10.4 FL (ref 8.9–12.7)
POTASSIUM SERPL-SCNC: 4.8 MMOL/L (ref 3.5–5.3)
PROCALCITONIN SERPL-MCNC: 0.32 NG/ML
PROT SERPL-MCNC: 7.5 G/DL (ref 6.4–8.2)
PROT UR STRIP-MCNC: NEGATIVE MG/DL
PROTHROMBIN TIME: 12.7 SECONDS (ref 9.4–11.7)
QRS AXIS: -71 DEGREES
QRSD INTERVAL: 160 MS
QT INTERVAL: 442 MS
QTC INTERVAL: 477 MS
RBC # BLD AUTO: 4.51 MILLION/UL (ref 3.88–5.62)
RBC #/AREA URNS AUTO: ABNORMAL /HPF
S PNEUM AG UR QL: NEGATIVE
SODIUM SERPL-SCNC: 135 MMOL/L (ref 136–145)
SP GR UR STRIP.AUTO: >=1.03 (ref 1–1.03)
T WAVE AXIS: 87 DEGREES
TROPONIN I SERPL-MCNC: <0.02 NG/ML
UROBILINOGEN UR QL STRIP.AUTO: 0.2 E.U./DL
VENTRICULAR RATE: 70 BPM
WBC # BLD AUTO: 12.23 THOUSAND/UL (ref 4.31–10.16)
WBC #/AREA URNS AUTO: ABNORMAL /HPF

## 2019-04-08 PROCEDURE — 36415 COLL VENOUS BLD VENIPUNCTURE: CPT | Performed by: EMERGENCY MEDICINE

## 2019-04-08 PROCEDURE — 99285 EMERGENCY DEPT VISIT HI MDM: CPT

## 2019-04-08 PROCEDURE — 99285 EMERGENCY DEPT VISIT HI MDM: CPT | Performed by: EMERGENCY MEDICINE

## 2019-04-08 PROCEDURE — 87070 CULTURE OTHR SPECIMN AEROBIC: CPT | Performed by: INTERNAL MEDICINE

## 2019-04-08 PROCEDURE — 93010 ELECTROCARDIOGRAM REPORT: CPT | Performed by: INTERNAL MEDICINE

## 2019-04-08 PROCEDURE — 94760 N-INVAS EAR/PLS OXIMETRY 1: CPT

## 2019-04-08 PROCEDURE — NC001 PR NO CHARGE: Performed by: INTERNAL MEDICINE

## 2019-04-08 PROCEDURE — 84484 ASSAY OF TROPONIN QUANT: CPT | Performed by: STUDENT IN AN ORGANIZED HEALTH CARE EDUCATION/TRAINING PROGRAM

## 2019-04-08 PROCEDURE — 83605 ASSAY OF LACTIC ACID: CPT | Performed by: EMERGENCY MEDICINE

## 2019-04-08 PROCEDURE — 71045 X-RAY EXAM CHEST 1 VIEW: CPT

## 2019-04-08 PROCEDURE — 96365 THER/PROPH/DIAG IV INF INIT: CPT

## 2019-04-08 PROCEDURE — 87086 URINE CULTURE/COLONY COUNT: CPT | Performed by: INTERNAL MEDICINE

## 2019-04-08 PROCEDURE — 94668 MNPJ CHEST WALL SBSQ: CPT

## 2019-04-08 PROCEDURE — 81001 URINALYSIS AUTO W/SCOPE: CPT | Performed by: INTERNAL MEDICINE

## 2019-04-08 PROCEDURE — 87081 CULTURE SCREEN ONLY: CPT | Performed by: STUDENT IN AN ORGANIZED HEALTH CARE EDUCATION/TRAINING PROGRAM

## 2019-04-08 PROCEDURE — 87205 SMEAR GRAM STAIN: CPT | Performed by: INTERNAL MEDICINE

## 2019-04-08 PROCEDURE — G8997 SWALLOW GOAL STATUS: HCPCS

## 2019-04-08 PROCEDURE — 87449 NOS EACH ORGANISM AG IA: CPT | Performed by: INTERNAL MEDICINE

## 2019-04-08 PROCEDURE — 93005 ELECTROCARDIOGRAM TRACING: CPT

## 2019-04-08 PROCEDURE — 99204 OFFICE O/P NEW MOD 45 MIN: CPT | Performed by: INTERNAL MEDICINE

## 2019-04-08 PROCEDURE — 85610 PROTHROMBIN TIME: CPT | Performed by: EMERGENCY MEDICINE

## 2019-04-08 PROCEDURE — 94640 AIRWAY INHALATION TREATMENT: CPT

## 2019-04-08 PROCEDURE — 83880 ASSAY OF NATRIURETIC PEPTIDE: CPT | Performed by: EMERGENCY MEDICINE

## 2019-04-08 PROCEDURE — 93306 TTE W/DOPPLER COMPLETE: CPT

## 2019-04-08 PROCEDURE — 84145 PROCALCITONIN (PCT): CPT | Performed by: STUDENT IN AN ORGANIZED HEALTH CARE EDUCATION/TRAINING PROGRAM

## 2019-04-08 PROCEDURE — 85025 COMPLETE CBC W/AUTO DIFF WBC: CPT | Performed by: EMERGENCY MEDICINE

## 2019-04-08 PROCEDURE — G8996 SWALLOW CURRENT STATUS: HCPCS

## 2019-04-08 PROCEDURE — 87631 RESP VIRUS 3-5 TARGETS: CPT | Performed by: INTERNAL MEDICINE

## 2019-04-08 PROCEDURE — 84484 ASSAY OF TROPONIN QUANT: CPT | Performed by: EMERGENCY MEDICINE

## 2019-04-08 PROCEDURE — 80053 COMPREHEN METABOLIC PANEL: CPT | Performed by: EMERGENCY MEDICINE

## 2019-04-08 PROCEDURE — 92610 EVALUATE SWALLOWING FUNCTION: CPT

## 2019-04-08 PROCEDURE — 99219 PR INITIAL OBSERVATION CARE/DAY 50 MINUTES: CPT | Performed by: STUDENT IN AN ORGANIZED HEALTH CARE EDUCATION/TRAINING PROGRAM

## 2019-04-08 PROCEDURE — 87077 CULTURE AEROBIC IDENTIFY: CPT | Performed by: INTERNAL MEDICINE

## 2019-04-08 PROCEDURE — 96375 TX/PRO/DX INJ NEW DRUG ADDON: CPT

## 2019-04-08 PROCEDURE — 87186 SC STD MICRODIL/AGAR DIL: CPT | Performed by: INTERNAL MEDICINE

## 2019-04-08 PROCEDURE — 94664 DEMO&/EVAL PT USE INHALER: CPT

## 2019-04-08 PROCEDURE — 85730 THROMBOPLASTIN TIME PARTIAL: CPT | Performed by: EMERGENCY MEDICINE

## 2019-04-08 RX ORDER — PRAVASTATIN SODIUM 40 MG
40 TABLET ORAL
Status: DISCONTINUED | OUTPATIENT
Start: 2019-04-08 | End: 2019-04-09

## 2019-04-08 RX ORDER — ALLOPURINOL 100 MG/1
100 TABLET ORAL DAILY
Status: DISCONTINUED | OUTPATIENT
Start: 2019-04-08 | End: 2019-04-13 | Stop reason: HOSPADM

## 2019-04-08 RX ORDER — TRAMADOL HYDROCHLORIDE 50 MG/1
50 TABLET ORAL EVERY 6 HOURS PRN
Status: DISCONTINUED | OUTPATIENT
Start: 2019-04-08 | End: 2019-04-13 | Stop reason: HOSPADM

## 2019-04-08 RX ORDER — FELODIPINE 2.5 MG/1
5 TABLET, EXTENDED RELEASE ORAL DAILY
Status: DISCONTINUED | OUTPATIENT
Start: 2019-04-08 | End: 2019-04-13 | Stop reason: HOSPADM

## 2019-04-08 RX ORDER — FUROSEMIDE 40 MG/1
40 TABLET ORAL DAILY
Status: DISCONTINUED | OUTPATIENT
Start: 2019-04-08 | End: 2019-04-09

## 2019-04-08 RX ORDER — CEFTRIAXONE 1 G/50ML
1000 INJECTION, SOLUTION INTRAVENOUS EVERY 24 HOURS
Status: DISCONTINUED | OUTPATIENT
Start: 2019-04-08 | End: 2019-04-11

## 2019-04-08 RX ORDER — IPRATROPIUM BROMIDE AND ALBUTEROL SULFATE 2.5; .5 MG/3ML; MG/3ML
3 SOLUTION RESPIRATORY (INHALATION)
Status: DISCONTINUED | OUTPATIENT
Start: 2019-04-08 | End: 2019-04-08 | Stop reason: SDUPTHER

## 2019-04-08 RX ORDER — METHYLPREDNISOLONE SODIUM SUCCINATE 125 MG/2ML
60 INJECTION, POWDER, LYOPHILIZED, FOR SOLUTION INTRAMUSCULAR; INTRAVENOUS EVERY 8 HOURS SCHEDULED
Status: DISCONTINUED | OUTPATIENT
Start: 2019-04-08 | End: 2019-04-08

## 2019-04-08 RX ORDER — IPRATROPIUM BROMIDE AND ALBUTEROL SULFATE 2.5; .5 MG/3ML; MG/3ML
3 SOLUTION RESPIRATORY (INHALATION)
Status: DISCONTINUED | OUTPATIENT
Start: 2019-04-08 | End: 2019-04-13 | Stop reason: HOSPADM

## 2019-04-08 RX ORDER — METHYLPREDNISOLONE SODIUM SUCCINATE 40 MG/ML
40 INJECTION, POWDER, LYOPHILIZED, FOR SOLUTION INTRAMUSCULAR; INTRAVENOUS EVERY 12 HOURS SCHEDULED
Status: DISCONTINUED | OUTPATIENT
Start: 2019-04-08 | End: 2019-04-09

## 2019-04-08 RX ORDER — ALBUTEROL SULFATE 2.5 MG/3ML
2.5 SOLUTION RESPIRATORY (INHALATION) EVERY 6 HOURS PRN
Status: DISCONTINUED | OUTPATIENT
Start: 2019-04-08 | End: 2019-04-13 | Stop reason: HOSPADM

## 2019-04-08 RX ORDER — ACETAMINOPHEN 325 MG/1
650 TABLET ORAL ONCE
Status: COMPLETED | OUTPATIENT
Start: 2019-04-08 | End: 2019-04-08

## 2019-04-08 RX ORDER — UBIDECARENONE 75 MG
100 CAPSULE ORAL DAILY
Status: DISCONTINUED | OUTPATIENT
Start: 2019-04-08 | End: 2019-04-13 | Stop reason: HOSPADM

## 2019-04-08 RX ORDER — GABAPENTIN 400 MG/1
800 CAPSULE ORAL 3 TIMES DAILY
Status: DISCONTINUED | OUTPATIENT
Start: 2019-04-08 | End: 2019-04-13 | Stop reason: HOSPADM

## 2019-04-08 RX ORDER — AZITHROMYCIN 250 MG/1
500 TABLET, FILM COATED ORAL EVERY 24 HOURS
Status: DISCONTINUED | OUTPATIENT
Start: 2019-04-09 | End: 2019-04-11

## 2019-04-08 RX ORDER — PAROXETINE HYDROCHLORIDE 20 MG/1
10 TABLET, FILM COATED ORAL DAILY
Status: DISCONTINUED | OUTPATIENT
Start: 2019-04-08 | End: 2019-04-13 | Stop reason: HOSPADM

## 2019-04-08 RX ORDER — CHLORAL HYDRATE 500 MG
1000 CAPSULE ORAL 2 TIMES DAILY
Status: DISCONTINUED | OUTPATIENT
Start: 2019-04-08 | End: 2019-04-08

## 2019-04-08 RX ORDER — ASCORBIC ACID 500 MG
500 TABLET ORAL DAILY
Status: DISCONTINUED | OUTPATIENT
Start: 2019-04-08 | End: 2019-04-13 | Stop reason: HOSPADM

## 2019-04-08 RX ORDER — FLUTICASONE FUROATE AND VILANTEROL 100; 25 UG/1; UG/1
1 POWDER RESPIRATORY (INHALATION)
Status: DISCONTINUED | OUTPATIENT
Start: 2019-04-08 | End: 2019-04-13 | Stop reason: HOSPADM

## 2019-04-08 RX ORDER — METHYLPREDNISOLONE SODIUM SUCCINATE 125 MG/2ML
125 INJECTION, POWDER, LYOPHILIZED, FOR SOLUTION INTRAMUSCULAR; INTRAVENOUS ONCE
Status: COMPLETED | OUTPATIENT
Start: 2019-04-08 | End: 2019-04-08

## 2019-04-08 RX ORDER — SODIUM PHOSPHATE,MONO-DIBASIC 19G-7G/118
500 ENEMA (ML) RECTAL 2 TIMES DAILY
Status: DISCONTINUED | OUTPATIENT
Start: 2019-04-08 | End: 2019-04-13 | Stop reason: HOSPADM

## 2019-04-08 RX ORDER — FINASTERIDE 5 MG/1
5 TABLET, FILM COATED ORAL DAILY
Status: DISCONTINUED | OUTPATIENT
Start: 2019-04-08 | End: 2019-04-13 | Stop reason: HOSPADM

## 2019-04-08 RX ORDER — SODIUM CHLORIDE 9 MG/ML
75 INJECTION, SOLUTION INTRAVENOUS CONTINUOUS
Status: DISCONTINUED | OUTPATIENT
Start: 2019-04-08 | End: 2019-04-10

## 2019-04-08 RX ORDER — OXYCODONE HYDROCHLORIDE AND ACETAMINOPHEN 5; 325 MG/1; MG/1
1 TABLET ORAL EVERY 6 HOURS PRN
Status: DISCONTINUED | OUTPATIENT
Start: 2019-04-08 | End: 2019-04-13 | Stop reason: HOSPADM

## 2019-04-08 RX ORDER — CLOPIDOGREL BISULFATE 75 MG/1
75 TABLET ORAL DAILY
Status: DISCONTINUED | OUTPATIENT
Start: 2019-04-08 | End: 2019-04-13 | Stop reason: HOSPADM

## 2019-04-08 RX ADMIN — ACETAMINOPHEN 650 MG: 325 TABLET, FILM COATED ORAL at 04:34

## 2019-04-08 RX ADMIN — Medication 500 MG: at 09:02

## 2019-04-08 RX ADMIN — ALLOPURINOL 100 MG: 100 TABLET ORAL at 09:03

## 2019-04-08 RX ADMIN — CLOPIDOGREL BISULFATE 75 MG: 75 TABLET ORAL at 09:03

## 2019-04-08 RX ADMIN — IPRATROPIUM BROMIDE AND ALBUTEROL SULFATE 3 ML: 2.5; .5 SOLUTION RESPIRATORY (INHALATION) at 08:06

## 2019-04-08 RX ADMIN — Medication 500 MG: at 18:33

## 2019-04-08 RX ADMIN — IPRATROPIUM BROMIDE AND ALBUTEROL SULFATE 3 ML: 2.5; .5 SOLUTION RESPIRATORY (INHALATION) at 04:19

## 2019-04-08 RX ADMIN — PAROXETINE 10 MG: 20 TABLET, FILM COATED ORAL at 09:03

## 2019-04-08 RX ADMIN — IPRATROPIUM BROMIDE AND ALBUTEROL SULFATE 3 ML: 2.5; .5 SOLUTION RESPIRATORY (INHALATION) at 03:30

## 2019-04-08 RX ADMIN — OXYCODONE HYDROCHLORIDE AND ACETAMINOPHEN 500 MG: 500 TABLET ORAL at 09:03

## 2019-04-08 RX ADMIN — CEFTRIAXONE 1000 MG: 1 INJECTION, SOLUTION INTRAVENOUS at 10:56

## 2019-04-08 RX ADMIN — METHYLPREDNISOLONE SODIUM SUCCINATE 60 MG: 125 INJECTION, POWDER, FOR SOLUTION INTRAMUSCULAR; INTRAVENOUS at 11:10

## 2019-04-08 RX ADMIN — VITAM B12 100 MCG: 100 TAB at 09:02

## 2019-04-08 RX ADMIN — PRAVASTATIN SODIUM 40 MG: 40 TABLET ORAL at 15:53

## 2019-04-08 RX ADMIN — FINASTERIDE 5 MG: 5 TABLET, FILM COATED ORAL at 09:04

## 2019-04-08 RX ADMIN — METHYLPREDNISOLONE SODIUM SUCCINATE 40 MG: 40 INJECTION, POWDER, FOR SOLUTION INTRAMUSCULAR; INTRAVENOUS at 21:55

## 2019-04-08 RX ADMIN — FUROSEMIDE 40 MG: 40 TABLET ORAL at 11:01

## 2019-04-08 RX ADMIN — METHYLPREDNISOLONE SODIUM SUCCINATE 125 MG: 125 INJECTION, POWDER, FOR SOLUTION INTRAMUSCULAR; INTRAVENOUS at 04:20

## 2019-04-08 RX ADMIN — Medication 1000 MG: at 09:03

## 2019-04-08 RX ADMIN — GABAPENTIN 800 MG: 400 CAPSULE ORAL at 21:55

## 2019-04-08 RX ADMIN — SODIUM CHLORIDE 50 ML/HR: 0.9 INJECTION, SOLUTION INTRAVENOUS at 10:55

## 2019-04-08 RX ADMIN — FLUTICASONE FUROATE AND VILANTEROL TRIFENATATE 1 PUFF: 100; 25 POWDER RESPIRATORY (INHALATION) at 09:01

## 2019-04-08 RX ADMIN — GABAPENTIN 800 MG: 400 CAPSULE ORAL at 09:03

## 2019-04-08 RX ADMIN — GABAPENTIN 800 MG: 400 CAPSULE ORAL at 15:53

## 2019-04-08 RX ADMIN — IPRATROPIUM BROMIDE AND ALBUTEROL SULFATE 3 ML: 2.5; .5 SOLUTION RESPIRATORY (INHALATION) at 20:05

## 2019-04-08 RX ADMIN — AZITHROMYCIN MONOHYDRATE 500 MG: 500 INJECTION, POWDER, LYOPHILIZED, FOR SOLUTION INTRAVENOUS at 04:30

## 2019-04-08 RX ADMIN — IPRATROPIUM BROMIDE AND ALBUTEROL SULFATE 3 ML: 2.5; .5 SOLUTION RESPIRATORY (INHALATION) at 14:39

## 2019-04-09 ENCOUNTER — APPOINTMENT (OUTPATIENT)
Dept: NON INVASIVE DIAGNOSTICS | Facility: HOSPITAL | Age: 79
DRG: 177 | End: 2019-04-09
Payer: MEDICARE

## 2019-04-09 PROBLEM — N17.9 ACUTE KIDNEY INJURY SUPERIMPOSED ON CHRONIC KIDNEY DISEASE (HCC): Status: ACTIVE | Noted: 2017-06-27

## 2019-04-09 PROBLEM — N18.9 ACUTE KIDNEY INJURY SUPERIMPOSED ON CHRONIC KIDNEY DISEASE (HCC): Status: ACTIVE | Noted: 2017-06-27

## 2019-04-09 PROBLEM — J20.9 ACUTE BRONCHITIS: Status: ACTIVE | Noted: 2019-04-08

## 2019-04-09 LAB
ANION GAP SERPL CALCULATED.3IONS-SCNC: 9 MMOL/L (ref 4–13)
BACTERIA UR CULT: NORMAL
BASOPHILS # BLD MANUAL: 0 THOUSAND/UL (ref 0–0.1)
BASOPHILS NFR MAR MANUAL: 0 % (ref 0–1)
BUN SERPL-MCNC: 34 MG/DL (ref 5–25)
CALCIUM SERPL-MCNC: 9.2 MG/DL (ref 8.3–10.1)
CHLORIDE SERPL-SCNC: 100 MMOL/L (ref 100–108)
CO2 SERPL-SCNC: 27 MMOL/L (ref 21–32)
CREAT SERPL-MCNC: 1.9 MG/DL (ref 0.6–1.3)
EOSINOPHIL # BLD MANUAL: 0 THOUSAND/UL (ref 0–0.4)
EOSINOPHIL NFR BLD MANUAL: 0 % (ref 0–6)
ERYTHROCYTE [DISTWIDTH] IN BLOOD BY AUTOMATED COUNT: 13.5 % (ref 11.6–15.1)
FLUAV AG SPEC QL: NOT DETECTED
FLUBV AG SPEC QL: NOT DETECTED
GFR SERPL CREATININE-BSD FRML MDRD: 33 ML/MIN/1.73SQ M
GLUCOSE P FAST SERPL-MCNC: 224 MG/DL (ref 65–99)
GLUCOSE SERPL-MCNC: 224 MG/DL (ref 65–140)
HCT VFR BLD AUTO: 38.2 % (ref 36.5–49.3)
HGB BLD-MCNC: 12.4 G/DL (ref 12–17)
LYMPHOCYTES # BLD AUTO: 0.93 THOUSAND/UL (ref 0.6–4.47)
LYMPHOCYTES # BLD AUTO: 6 % (ref 14–44)
MACROCYTES BLD QL AUTO: PRESENT
MCH RBC QN AUTO: 31.2 PG (ref 26.8–34.3)
MCHC RBC AUTO-ENTMCNC: 32.5 G/DL (ref 31.4–37.4)
MCV RBC AUTO: 96 FL (ref 82–98)
MONOCYTES # BLD AUTO: 0.31 THOUSAND/UL (ref 0–1.22)
MONOCYTES NFR BLD: 2 % (ref 4–12)
MRSA NOSE QL CULT: NORMAL
NEUTROPHILS # BLD MANUAL: 14.33 THOUSAND/UL (ref 1.85–7.62)
NEUTS BAND NFR BLD MANUAL: 18 % (ref 0–8)
NEUTS SEG NFR BLD AUTO: 74 % (ref 43–75)
NRBC BLD AUTO-RTO: 0 /100 WBCS
PLATELET # BLD AUTO: 132 THOUSANDS/UL (ref 149–390)
PLATELET BLD QL SMEAR: ABNORMAL
PMV BLD AUTO: 10.3 FL (ref 8.9–12.7)
POTASSIUM SERPL-SCNC: 4.5 MMOL/L (ref 3.5–5.3)
RBC # BLD AUTO: 3.98 MILLION/UL (ref 3.88–5.62)
RBC MORPH BLD: PRESENT
RSV B RNA SPEC QL NAA+PROBE: NOT DETECTED
SODIUM SERPL-SCNC: 136 MMOL/L (ref 136–145)
TOTAL CELLS COUNTED SPEC: 100
WBC # BLD AUTO: 15.58 THOUSAND/UL (ref 4.31–10.16)

## 2019-04-09 PROCEDURE — 99232 SBSQ HOSP IP/OBS MODERATE 35: CPT | Performed by: INTERNAL MEDICINE

## 2019-04-09 PROCEDURE — C1769 GUIDE WIRE: HCPCS

## 2019-04-09 PROCEDURE — 80048 BASIC METABOLIC PNL TOTAL CA: CPT | Performed by: INTERNAL MEDICINE

## 2019-04-09 PROCEDURE — 85027 COMPLETE CBC AUTOMATED: CPT | Performed by: INTERNAL MEDICINE

## 2019-04-09 PROCEDURE — 94640 AIRWAY INHALATION TREATMENT: CPT

## 2019-04-09 PROCEDURE — 93458 L HRT ARTERY/VENTRICLE ANGIO: CPT | Performed by: INTERNAL MEDICINE

## 2019-04-09 PROCEDURE — 99152 MOD SED SAME PHYS/QHP 5/>YRS: CPT | Performed by: INTERNAL MEDICINE

## 2019-04-09 PROCEDURE — B211YZZ FLUOROSCOPY OF MULTIPLE CORONARY ARTERIES USING OTHER CONTRAST: ICD-10-PCS | Performed by: INTERNAL MEDICINE

## 2019-04-09 PROCEDURE — 85007 BL SMEAR W/DIFF WBC COUNT: CPT | Performed by: INTERNAL MEDICINE

## 2019-04-09 PROCEDURE — 93454 CORONARY ARTERY ANGIO S&I: CPT

## 2019-04-09 PROCEDURE — 92526 ORAL FUNCTION THERAPY: CPT

## 2019-04-09 PROCEDURE — 94760 N-INVAS EAR/PLS OXIMETRY 1: CPT

## 2019-04-09 PROCEDURE — 99232 SBSQ HOSP IP/OBS MODERATE 35: CPT | Performed by: FAMILY MEDICINE

## 2019-04-09 PROCEDURE — 94668 MNPJ CHEST WALL SBSQ: CPT

## 2019-04-09 RX ORDER — SODIUM CHLORIDE 9 MG/ML
75 INJECTION, SOLUTION INTRAVENOUS CONTINUOUS
Status: DISCONTINUED | OUTPATIENT
Start: 2019-04-09 | End: 2019-04-10

## 2019-04-09 RX ORDER — HEPARIN SODIUM 1000 [USP'U]/ML
INJECTION, SOLUTION INTRAVENOUS; SUBCUTANEOUS CODE/TRAUMA/SEDATION MEDICATION
Status: COMPLETED | OUTPATIENT
Start: 2019-04-09 | End: 2019-04-09

## 2019-04-09 RX ORDER — ATORVASTATIN CALCIUM 40 MG/1
40 TABLET, FILM COATED ORAL
Status: DISCONTINUED | OUTPATIENT
Start: 2019-04-09 | End: 2019-04-13 | Stop reason: HOSPADM

## 2019-04-09 RX ORDER — MIDAZOLAM HYDROCHLORIDE 1 MG/ML
INJECTION INTRAMUSCULAR; INTRAVENOUS CODE/TRAUMA/SEDATION MEDICATION
Status: COMPLETED | OUTPATIENT
Start: 2019-04-09 | End: 2019-04-09

## 2019-04-09 RX ORDER — METHYLPREDNISOLONE SODIUM SUCCINATE 40 MG/ML
20 INJECTION, POWDER, LYOPHILIZED, FOR SOLUTION INTRAMUSCULAR; INTRAVENOUS EVERY 12 HOURS SCHEDULED
Status: DISCONTINUED | OUTPATIENT
Start: 2019-04-09 | End: 2019-04-11

## 2019-04-09 RX ORDER — LIDOCAINE HYDROCHLORIDE 10 MG/ML
INJECTION, SOLUTION INFILTRATION; PERINEURAL CODE/TRAUMA/SEDATION MEDICATION
Status: COMPLETED | OUTPATIENT
Start: 2019-04-09 | End: 2019-04-09

## 2019-04-09 RX ORDER — NITROGLYCERIN 20 MG/100ML
INJECTION INTRAVENOUS CODE/TRAUMA/SEDATION MEDICATION
Status: COMPLETED | OUTPATIENT
Start: 2019-04-09 | End: 2019-04-09

## 2019-04-09 RX ORDER — VERAPAMIL HYDROCHLORIDE 2.5 MG/ML
INJECTION, SOLUTION INTRAVENOUS CODE/TRAUMA/SEDATION MEDICATION
Status: COMPLETED | OUTPATIENT
Start: 2019-04-09 | End: 2019-04-09

## 2019-04-09 RX ORDER — FENTANYL CITRATE 50 UG/ML
INJECTION, SOLUTION INTRAMUSCULAR; INTRAVENOUS CODE/TRAUMA/SEDATION MEDICATION
Status: COMPLETED | OUTPATIENT
Start: 2019-04-09 | End: 2019-04-09

## 2019-04-09 RX ADMIN — GABAPENTIN 800 MG: 400 CAPSULE ORAL at 11:14

## 2019-04-09 RX ADMIN — METHYLPREDNISOLONE SODIUM SUCCINATE 40 MG: 40 INJECTION, POWDER, FOR SOLUTION INTRAMUSCULAR; INTRAVENOUS at 08:42

## 2019-04-09 RX ADMIN — NITROGLYCERIN 200 MCG: 20 INJECTION INTRAVENOUS at 10:17

## 2019-04-09 RX ADMIN — RIVAROXABAN 15 MG: 15 TABLET, FILM COATED ORAL at 11:14

## 2019-04-09 RX ADMIN — PAROXETINE 10 MG: 20 TABLET, FILM COATED ORAL at 11:15

## 2019-04-09 RX ADMIN — IPRATROPIUM BROMIDE AND ALBUTEROL SULFATE 3 ML: 2.5; .5 SOLUTION RESPIRATORY (INHALATION) at 20:10

## 2019-04-09 RX ADMIN — METHYLPREDNISOLONE SODIUM SUCCINATE 20 MG: 40 INJECTION, POWDER, FOR SOLUTION INTRAMUSCULAR; INTRAVENOUS at 21:02

## 2019-04-09 RX ADMIN — ATORVASTATIN CALCIUM 40 MG: 40 TABLET, FILM COATED ORAL at 16:27

## 2019-04-09 RX ADMIN — VERAPAMIL HYDROCHLORIDE 2.5 MG: 2.5 INJECTION, SOLUTION INTRAVENOUS at 10:18

## 2019-04-09 RX ADMIN — CLOPIDOGREL BISULFATE 75 MG: 75 TABLET ORAL at 11:15

## 2019-04-09 RX ADMIN — AZITHROMYCIN 500 MG: 250 TABLET, FILM COATED ORAL at 04:42

## 2019-04-09 RX ADMIN — FINASTERIDE 5 MG: 5 TABLET, FILM COATED ORAL at 11:15

## 2019-04-09 RX ADMIN — OXYCODONE HYDROCHLORIDE AND ACETAMINOPHEN 500 MG: 500 TABLET ORAL at 11:15

## 2019-04-09 RX ADMIN — GABAPENTIN 800 MG: 400 CAPSULE ORAL at 16:27

## 2019-04-09 RX ADMIN — IPRATROPIUM BROMIDE AND ALBUTEROL SULFATE 3 ML: 2.5; .5 SOLUTION RESPIRATORY (INHALATION) at 02:16

## 2019-04-09 RX ADMIN — IPRATROPIUM BROMIDE AND ALBUTEROL SULFATE 3 ML: 2.5; .5 SOLUTION RESPIRATORY (INHALATION) at 14:07

## 2019-04-09 RX ADMIN — VITAM B12 100 MCG: 100 TAB at 12:37

## 2019-04-09 RX ADMIN — FENTANYL CITRATE 100 MCG: 50 INJECTION, SOLUTION INTRAMUSCULAR; INTRAVENOUS at 10:14

## 2019-04-09 RX ADMIN — LIDOCAINE HYDROCHLORIDE 2 ML: 10 INJECTION, SOLUTION INFILTRATION; PERINEURAL at 10:13

## 2019-04-09 RX ADMIN — FELODIPINE 5 MG: 2.5 TABLET, FILM COATED, EXTENDED RELEASE ORAL at 11:18

## 2019-04-09 RX ADMIN — FLUTICASONE FUROATE AND VILANTEROL TRIFENATATE 1 PUFF: 100; 25 POWDER RESPIRATORY (INHALATION) at 08:46

## 2019-04-09 RX ADMIN — SODIUM CHLORIDE 75 ML/HR: 0.9 INJECTION, SOLUTION INTRAVENOUS at 11:24

## 2019-04-09 RX ADMIN — ALLOPURINOL 100 MG: 100 TABLET ORAL at 11:15

## 2019-04-09 RX ADMIN — Medication 500 MG: at 12:38

## 2019-04-09 RX ADMIN — GABAPENTIN 800 MG: 400 CAPSULE ORAL at 21:02

## 2019-04-09 RX ADMIN — HEPARIN SODIUM 5000 UNITS: 1000 INJECTION INTRAVENOUS; SUBCUTANEOUS at 10:18

## 2019-04-09 RX ADMIN — IPRATROPIUM BROMIDE AND ALBUTEROL SULFATE 3 ML: 2.5; .5 SOLUTION RESPIRATORY (INHALATION) at 07:39

## 2019-04-09 RX ADMIN — IODIXANOL 95 ML: 270 INJECTION, SOLUTION INTRAVASCULAR at 10:30

## 2019-04-09 RX ADMIN — MIDAZOLAM HYDROCHLORIDE 2 MG: 1 INJECTION, SOLUTION INTRAMUSCULAR; INTRAVENOUS at 10:14

## 2019-04-09 RX ADMIN — CEFTRIAXONE 1000 MG: 1 INJECTION, SOLUTION INTRAVENOUS at 11:25

## 2019-04-10 ENCOUNTER — APPOINTMENT (INPATIENT)
Dept: RADIOLOGY | Facility: HOSPITAL | Age: 79
DRG: 177 | End: 2019-04-10
Payer: MEDICARE

## 2019-04-10 PROBLEM — J18.9 RIGHT UPPER LOBE PNEUMONIA: Status: ACTIVE | Noted: 2019-04-08

## 2019-04-10 PROBLEM — E87.1 HYPONATREMIA: Status: RESOLVED | Noted: 2019-04-08 | Resolved: 2019-04-10

## 2019-04-10 PROBLEM — J15.7 PNEUMONIA OF RIGHT UPPER LOBE DUE TO MYCOPLASMA PNEUMONIAE: Status: ACTIVE | Noted: 2019-04-08

## 2019-04-10 LAB
ALBUMIN SERPL BCP-MCNC: 2.9 G/DL (ref 3.5–5)
ALP SERPL-CCNC: 50 U/L (ref 46–116)
ALT SERPL W P-5'-P-CCNC: 42 U/L (ref 12–78)
ANION GAP SERPL CALCULATED.3IONS-SCNC: 11 MMOL/L (ref 4–13)
AST SERPL W P-5'-P-CCNC: 38 U/L (ref 5–45)
BILIRUB SERPL-MCNC: 0.5 MG/DL (ref 0.2–1)
BUN SERPL-MCNC: 49 MG/DL (ref 5–25)
CALCIUM SERPL-MCNC: 9.1 MG/DL (ref 8.3–10.1)
CHLORIDE SERPL-SCNC: 101 MMOL/L (ref 100–108)
CHOLEST SERPL-MCNC: 157 MG/DL (ref 50–200)
CO2 SERPL-SCNC: 26 MMOL/L (ref 21–32)
CREAT SERPL-MCNC: 1.88 MG/DL (ref 0.6–1.3)
ERYTHROCYTE [DISTWIDTH] IN BLOOD BY AUTOMATED COUNT: 13.9 % (ref 11.6–15.1)
GFR SERPL CREATININE-BSD FRML MDRD: 33 ML/MIN/1.73SQ M
GLUCOSE SERPL-MCNC: 185 MG/DL (ref 65–140)
HCT VFR BLD AUTO: 42.1 % (ref 36.5–49.3)
HDLC SERPL-MCNC: 52 MG/DL (ref 40–60)
HGB BLD-MCNC: 13 G/DL (ref 12–17)
LDLC SERPL CALC-MCNC: 91 MG/DL (ref 0–100)
MCH RBC QN AUTO: 30.4 PG (ref 26.8–34.3)
MCHC RBC AUTO-ENTMCNC: 30.9 G/DL (ref 31.4–37.4)
MCV RBC AUTO: 99 FL (ref 82–98)
NONHDLC SERPL-MCNC: 105 MG/DL
PLATELET # BLD AUTO: 163 THOUSANDS/UL (ref 149–390)
PMV BLD AUTO: 10.4 FL (ref 8.9–12.7)
POTASSIUM SERPL-SCNC: 4.9 MMOL/L (ref 3.5–5.3)
PROCALCITONIN SERPL-MCNC: 0.37 NG/ML
PROT SERPL-MCNC: 8 G/DL (ref 6.4–8.2)
RBC # BLD AUTO: 4.27 MILLION/UL (ref 3.88–5.62)
SODIUM SERPL-SCNC: 138 MMOL/L (ref 136–145)
TRIGL SERPL-MCNC: 70 MG/DL
WBC # BLD AUTO: 16.7 THOUSAND/UL (ref 4.31–10.16)

## 2019-04-10 PROCEDURE — 80053 COMPREHEN METABOLIC PANEL: CPT | Performed by: INTERNAL MEDICINE

## 2019-04-10 PROCEDURE — 99232 SBSQ HOSP IP/OBS MODERATE 35: CPT | Performed by: INTERNAL MEDICINE

## 2019-04-10 PROCEDURE — 94668 MNPJ CHEST WALL SBSQ: CPT

## 2019-04-10 PROCEDURE — 80061 LIPID PANEL: CPT | Performed by: INTERNAL MEDICINE

## 2019-04-10 PROCEDURE — 92526 ORAL FUNCTION THERAPY: CPT

## 2019-04-10 PROCEDURE — 99222 1ST HOSP IP/OBS MODERATE 55: CPT | Performed by: INTERNAL MEDICINE

## 2019-04-10 PROCEDURE — 94760 N-INVAS EAR/PLS OXIMETRY 1: CPT

## 2019-04-10 PROCEDURE — 94640 AIRWAY INHALATION TREATMENT: CPT

## 2019-04-10 PROCEDURE — 85027 COMPLETE CBC AUTOMATED: CPT | Performed by: FAMILY MEDICINE

## 2019-04-10 PROCEDURE — 99232 SBSQ HOSP IP/OBS MODERATE 35: CPT | Performed by: FAMILY MEDICINE

## 2019-04-10 PROCEDURE — 84145 PROCALCITONIN (PCT): CPT | Performed by: FAMILY MEDICINE

## 2019-04-10 PROCEDURE — 71045 X-RAY EXAM CHEST 1 VIEW: CPT

## 2019-04-10 PROCEDURE — 93306 TTE W/DOPPLER COMPLETE: CPT | Performed by: INTERNAL MEDICINE

## 2019-04-10 RX ORDER — FUROSEMIDE 10 MG/ML
20 INJECTION INTRAMUSCULAR; INTRAVENOUS ONCE
Status: COMPLETED | OUTPATIENT
Start: 2019-04-10 | End: 2019-04-10

## 2019-04-10 RX ADMIN — FLUTICASONE FUROATE AND VILANTEROL TRIFENATATE 1 PUFF: 100; 25 POWDER RESPIRATORY (INHALATION) at 08:12

## 2019-04-10 RX ADMIN — FUROSEMIDE 20 MG: 10 INJECTION, SOLUTION INTRAMUSCULAR; INTRAVENOUS at 06:15

## 2019-04-10 RX ADMIN — CEFTRIAXONE 1000 MG: 1 INJECTION, SOLUTION INTRAVENOUS at 11:28

## 2019-04-10 RX ADMIN — GABAPENTIN 800 MG: 400 CAPSULE ORAL at 18:29

## 2019-04-10 RX ADMIN — Medication 500 MG: at 18:29

## 2019-04-10 RX ADMIN — IPRATROPIUM BROMIDE AND ALBUTEROL SULFATE 3 ML: 2.5; .5 SOLUTION RESPIRATORY (INHALATION) at 07:23

## 2019-04-10 RX ADMIN — AZITHROMYCIN 500 MG: 250 TABLET, FILM COATED ORAL at 04:42

## 2019-04-10 RX ADMIN — Medication 500 MG: at 08:12

## 2019-04-10 RX ADMIN — IPRATROPIUM BROMIDE AND ALBUTEROL SULFATE 3 ML: 2.5; .5 SOLUTION RESPIRATORY (INHALATION) at 20:30

## 2019-04-10 RX ADMIN — GABAPENTIN 800 MG: 400 CAPSULE ORAL at 08:10

## 2019-04-10 RX ADMIN — ALLOPURINOL 100 MG: 100 TABLET ORAL at 08:11

## 2019-04-10 RX ADMIN — FELODIPINE 5 MG: 2.5 TABLET, FILM COATED, EXTENDED RELEASE ORAL at 08:10

## 2019-04-10 RX ADMIN — METHYLPREDNISOLONE SODIUM SUCCINATE 20 MG: 40 INJECTION, POWDER, FOR SOLUTION INTRAMUSCULAR; INTRAVENOUS at 08:10

## 2019-04-10 RX ADMIN — NICOTINE 1 PATCH: 7 PATCH TRANSDERMAL at 08:11

## 2019-04-10 RX ADMIN — ATORVASTATIN CALCIUM 40 MG: 40 TABLET, FILM COATED ORAL at 18:29

## 2019-04-10 RX ADMIN — CLOPIDOGREL BISULFATE 75 MG: 75 TABLET ORAL at 08:11

## 2019-04-10 RX ADMIN — FINASTERIDE 5 MG: 5 TABLET, FILM COATED ORAL at 08:11

## 2019-04-10 RX ADMIN — METHYLPREDNISOLONE SODIUM SUCCINATE 20 MG: 40 INJECTION, POWDER, FOR SOLUTION INTRAMUSCULAR; INTRAVENOUS at 20:41

## 2019-04-10 RX ADMIN — FUROSEMIDE 20 MG: 10 INJECTION, SOLUTION INTRAMUSCULAR; INTRAVENOUS at 04:41

## 2019-04-10 RX ADMIN — RIVAROXABAN 15 MG: 15 TABLET, FILM COATED ORAL at 08:11

## 2019-04-10 RX ADMIN — ALBUTEROL SULFATE 2.5 MG: 2.5 SOLUTION RESPIRATORY (INHALATION) at 04:38

## 2019-04-10 RX ADMIN — VITAM B12 100 MCG: 100 TAB at 10:20

## 2019-04-10 RX ADMIN — GABAPENTIN 800 MG: 400 CAPSULE ORAL at 20:40

## 2019-04-10 RX ADMIN — PAROXETINE 10 MG: 20 TABLET, FILM COATED ORAL at 08:10

## 2019-04-10 RX ADMIN — OXYCODONE HYDROCHLORIDE AND ACETAMINOPHEN 500 MG: 500 TABLET ORAL at 08:11

## 2019-04-10 RX ADMIN — IPRATROPIUM BROMIDE AND ALBUTEROL SULFATE 3 ML: 2.5; .5 SOLUTION RESPIRATORY (INHALATION) at 13:44

## 2019-04-11 PROBLEM — D69.6 THROMBOCYTOPENIA (HCC): Status: RESOLVED | Noted: 2019-04-08 | Resolved: 2019-04-11

## 2019-04-11 PROBLEM — J96.01 ACUTE HYPOXEMIC RESPIRATORY FAILURE (HCC): Status: ACTIVE | Noted: 2019-04-11

## 2019-04-11 PROBLEM — R17 SERUM TOTAL BILIRUBIN ELEVATED: Status: RESOLVED | Noted: 2019-04-08 | Resolved: 2019-04-11

## 2019-04-11 LAB
ANION GAP SERPL CALCULATED.3IONS-SCNC: 9 MMOL/L (ref 4–13)
BUN SERPL-MCNC: 47 MG/DL (ref 5–25)
CALCIUM SERPL-MCNC: 9.1 MG/DL (ref 8.3–10.1)
CHLORIDE SERPL-SCNC: 102 MMOL/L (ref 100–108)
CO2 SERPL-SCNC: 26 MMOL/L (ref 21–32)
CREAT SERPL-MCNC: 1.55 MG/DL (ref 0.6–1.3)
ERYTHROCYTE [DISTWIDTH] IN BLOOD BY AUTOMATED COUNT: 13.8 % (ref 11.6–15.1)
GFR SERPL CREATININE-BSD FRML MDRD: 42 ML/MIN/1.73SQ M
GLUCOSE SERPL-MCNC: 198 MG/DL (ref 65–140)
HCT VFR BLD AUTO: 39.8 % (ref 36.5–49.3)
HGB BLD-MCNC: 12.6 G/DL (ref 12–17)
MCH RBC QN AUTO: 30.6 PG (ref 26.8–34.3)
MCHC RBC AUTO-ENTMCNC: 31.7 G/DL (ref 31.4–37.4)
MCV RBC AUTO: 97 FL (ref 82–98)
PLATELET # BLD AUTO: 173 THOUSANDS/UL (ref 149–390)
PMV BLD AUTO: 10.3 FL (ref 8.9–12.7)
POTASSIUM SERPL-SCNC: 5 MMOL/L (ref 3.5–5.3)
PROCALCITONIN SERPL-MCNC: 0.29 NG/ML
RBC # BLD AUTO: 4.12 MILLION/UL (ref 3.88–5.62)
SODIUM SERPL-SCNC: 137 MMOL/L (ref 136–145)
WBC # BLD AUTO: 13.11 THOUSAND/UL (ref 4.31–10.16)

## 2019-04-11 PROCEDURE — 80048 BASIC METABOLIC PNL TOTAL CA: CPT | Performed by: FAMILY MEDICINE

## 2019-04-11 PROCEDURE — 99233 SBSQ HOSP IP/OBS HIGH 50: CPT | Performed by: INTERNAL MEDICINE

## 2019-04-11 PROCEDURE — 94760 N-INVAS EAR/PLS OXIMETRY 1: CPT

## 2019-04-11 PROCEDURE — 99232 SBSQ HOSP IP/OBS MODERATE 35: CPT | Performed by: INTERNAL MEDICINE

## 2019-04-11 PROCEDURE — 84145 PROCALCITONIN (PCT): CPT | Performed by: FAMILY MEDICINE

## 2019-04-11 PROCEDURE — 85027 COMPLETE CBC AUTOMATED: CPT | Performed by: FAMILY MEDICINE

## 2019-04-11 PROCEDURE — 94640 AIRWAY INHALATION TREATMENT: CPT

## 2019-04-11 PROCEDURE — 92526 ORAL FUNCTION THERAPY: CPT

## 2019-04-11 PROCEDURE — 99232 SBSQ HOSP IP/OBS MODERATE 35: CPT | Performed by: FAMILY MEDICINE

## 2019-04-11 RX ORDER — METHYLPREDNISOLONE SODIUM SUCCINATE 40 MG/ML
20 INJECTION, POWDER, LYOPHILIZED, FOR SOLUTION INTRAMUSCULAR; INTRAVENOUS EVERY 8 HOURS SCHEDULED
Status: DISCONTINUED | OUTPATIENT
Start: 2019-04-11 | End: 2019-04-12

## 2019-04-11 RX ORDER — FUROSEMIDE 10 MG/ML
20 INJECTION INTRAMUSCULAR; INTRAVENOUS ONCE
Status: COMPLETED | OUTPATIENT
Start: 2019-04-11 | End: 2019-04-11

## 2019-04-11 RX ADMIN — Medication 500 MG: at 18:49

## 2019-04-11 RX ADMIN — METHYLPREDNISOLONE SODIUM SUCCINATE 20 MG: 40 INJECTION, POWDER, FOR SOLUTION INTRAMUSCULAR; INTRAVENOUS at 22:41

## 2019-04-11 RX ADMIN — PAROXETINE 10 MG: 20 TABLET, FILM COATED ORAL at 11:00

## 2019-04-11 RX ADMIN — AZITHROMYCIN 500 MG: 250 TABLET, FILM COATED ORAL at 04:04

## 2019-04-11 RX ADMIN — FUROSEMIDE 20 MG: 10 INJECTION, SOLUTION INTRAMUSCULAR; INTRAVENOUS at 11:28

## 2019-04-11 RX ADMIN — IPRATROPIUM BROMIDE AND ALBUTEROL SULFATE 3 ML: 2.5; .5 SOLUTION RESPIRATORY (INHALATION) at 07:12

## 2019-04-11 RX ADMIN — FUROSEMIDE 20 MG: 10 INJECTION, SOLUTION INTRAMUSCULAR; INTRAVENOUS at 20:28

## 2019-04-11 RX ADMIN — GABAPENTIN 800 MG: 400 CAPSULE ORAL at 16:08

## 2019-04-11 RX ADMIN — Medication 500 MG: at 11:15

## 2019-04-11 RX ADMIN — FLUTICASONE FUROATE AND VILANTEROL TRIFENATATE 1 PUFF: 100; 25 POWDER RESPIRATORY (INHALATION) at 11:13

## 2019-04-11 RX ADMIN — METHYLPREDNISOLONE SODIUM SUCCINATE 20 MG: 40 INJECTION, POWDER, FOR SOLUTION INTRAMUSCULAR; INTRAVENOUS at 11:11

## 2019-04-11 RX ADMIN — IPRATROPIUM BROMIDE AND ALBUTEROL SULFATE 3 ML: 2.5; .5 SOLUTION RESPIRATORY (INHALATION) at 13:31

## 2019-04-11 RX ADMIN — RIVAROXABAN 15 MG: 15 TABLET, FILM COATED ORAL at 11:10

## 2019-04-11 RX ADMIN — CEFTRIAXONE 1000 MG: 1 INJECTION, SOLUTION INTRAVENOUS at 11:10

## 2019-04-11 RX ADMIN — IPRATROPIUM BROMIDE AND ALBUTEROL SULFATE 3 ML: 2.5; .5 SOLUTION RESPIRATORY (INHALATION) at 02:30

## 2019-04-11 RX ADMIN — FELODIPINE 5 MG: 2.5 TABLET, FILM COATED, EXTENDED RELEASE ORAL at 11:16

## 2019-04-11 RX ADMIN — VITAM B12 100 MCG: 100 TAB at 11:14

## 2019-04-11 RX ADMIN — GABAPENTIN 800 MG: 400 CAPSULE ORAL at 11:10

## 2019-04-11 RX ADMIN — CLOPIDOGREL BISULFATE 75 MG: 75 TABLET ORAL at 11:10

## 2019-04-11 RX ADMIN — FINASTERIDE 5 MG: 5 TABLET, FILM COATED ORAL at 11:09

## 2019-04-11 RX ADMIN — OXYCODONE HYDROCHLORIDE AND ACETAMINOPHEN 500 MG: 500 TABLET ORAL at 11:10

## 2019-04-11 RX ADMIN — ALLOPURINOL 100 MG: 100 TABLET ORAL at 11:09

## 2019-04-11 RX ADMIN — GABAPENTIN 800 MG: 400 CAPSULE ORAL at 22:42

## 2019-04-11 RX ADMIN — LEVOFLOXACIN 750 MG: 500 TABLET, FILM COATED ORAL at 22:42

## 2019-04-11 RX ADMIN — ATORVASTATIN CALCIUM 40 MG: 40 TABLET, FILM COATED ORAL at 16:08

## 2019-04-12 LAB
ANION GAP SERPL CALCULATED.3IONS-SCNC: 10 MMOL/L (ref 4–13)
BACTERIA SPT RESP CULT: ABNORMAL
BACTERIA SPT RESP CULT: ABNORMAL
BUN SERPL-MCNC: 42 MG/DL (ref 5–25)
CALCIUM SERPL-MCNC: 8.9 MG/DL (ref 8.3–10.1)
CHLORIDE SERPL-SCNC: 102 MMOL/L (ref 100–108)
CO2 SERPL-SCNC: 26 MMOL/L (ref 21–32)
CREAT SERPL-MCNC: 1.5 MG/DL (ref 0.6–1.3)
GFR SERPL CREATININE-BSD FRML MDRD: 44 ML/MIN/1.73SQ M
GLUCOSE SERPL-MCNC: 195 MG/DL (ref 65–140)
GRAM STN SPEC: ABNORMAL
POTASSIUM SERPL-SCNC: 4.3 MMOL/L (ref 3.5–5.3)
SODIUM SERPL-SCNC: 138 MMOL/L (ref 136–145)

## 2019-04-12 PROCEDURE — 99233 SBSQ HOSP IP/OBS HIGH 50: CPT | Performed by: PHYSICIAN ASSISTANT

## 2019-04-12 PROCEDURE — 99232 SBSQ HOSP IP/OBS MODERATE 35: CPT | Performed by: INTERNAL MEDICINE

## 2019-04-12 PROCEDURE — 97163 PT EVAL HIGH COMPLEX 45 MIN: CPT

## 2019-04-12 PROCEDURE — G8979 MOBILITY GOAL STATUS: HCPCS

## 2019-04-12 PROCEDURE — 97530 THERAPEUTIC ACTIVITIES: CPT

## 2019-04-12 PROCEDURE — 99232 SBSQ HOSP IP/OBS MODERATE 35: CPT | Performed by: FAMILY MEDICINE

## 2019-04-12 PROCEDURE — G8978 MOBILITY CURRENT STATUS: HCPCS

## 2019-04-12 PROCEDURE — 80048 BASIC METABOLIC PNL TOTAL CA: CPT | Performed by: FAMILY MEDICINE

## 2019-04-12 PROCEDURE — 94760 N-INVAS EAR/PLS OXIMETRY 1: CPT

## 2019-04-12 PROCEDURE — 94640 AIRWAY INHALATION TREATMENT: CPT

## 2019-04-12 RX ORDER — METHYLPREDNISOLONE SODIUM SUCCINATE 40 MG/ML
20 INJECTION, POWDER, LYOPHILIZED, FOR SOLUTION INTRAMUSCULAR; INTRAVENOUS EVERY 12 HOURS SCHEDULED
Status: DISCONTINUED | OUTPATIENT
Start: 2019-04-12 | End: 2019-04-13 | Stop reason: HOSPADM

## 2019-04-12 RX ORDER — FUROSEMIDE 10 MG/ML
20 INJECTION INTRAMUSCULAR; INTRAVENOUS
Status: DISCONTINUED | OUTPATIENT
Start: 2019-04-12 | End: 2019-04-13 | Stop reason: HOSPADM

## 2019-04-12 RX ADMIN — METHYLPREDNISOLONE SODIUM SUCCINATE 20 MG: 40 INJECTION, POWDER, FOR SOLUTION INTRAMUSCULAR; INTRAVENOUS at 05:49

## 2019-04-12 RX ADMIN — OXYCODONE HYDROCHLORIDE AND ACETAMINOPHEN 500 MG: 500 TABLET ORAL at 09:29

## 2019-04-12 RX ADMIN — METHYLPREDNISOLONE SODIUM SUCCINATE 20 MG: 40 INJECTION, POWDER, FOR SOLUTION INTRAMUSCULAR; INTRAVENOUS at 21:17

## 2019-04-12 RX ADMIN — VITAM B12 100 MCG: 100 TAB at 09:30

## 2019-04-12 RX ADMIN — RIVAROXABAN 15 MG: 15 TABLET, FILM COATED ORAL at 09:29

## 2019-04-12 RX ADMIN — FLUTICASONE FUROATE AND VILANTEROL TRIFENATATE 1 PUFF: 100; 25 POWDER RESPIRATORY (INHALATION) at 09:33

## 2019-04-12 RX ADMIN — GABAPENTIN 800 MG: 400 CAPSULE ORAL at 16:09

## 2019-04-12 RX ADMIN — FUROSEMIDE 20 MG: 10 INJECTION, SOLUTION INTRAMUSCULAR; INTRAVENOUS at 16:09

## 2019-04-12 RX ADMIN — IPRATROPIUM BROMIDE AND ALBUTEROL SULFATE 3 ML: 2.5; .5 SOLUTION RESPIRATORY (INHALATION) at 08:16

## 2019-04-12 RX ADMIN — FINASTERIDE 5 MG: 5 TABLET, FILM COATED ORAL at 09:29

## 2019-04-12 RX ADMIN — IPRATROPIUM BROMIDE AND ALBUTEROL SULFATE 3 ML: 2.5; .5 SOLUTION RESPIRATORY (INHALATION) at 13:48

## 2019-04-12 RX ADMIN — IPRATROPIUM BROMIDE AND ALBUTEROL SULFATE 3 ML: 2.5; .5 SOLUTION RESPIRATORY (INHALATION) at 02:49

## 2019-04-12 RX ADMIN — ATORVASTATIN CALCIUM 40 MG: 40 TABLET, FILM COATED ORAL at 16:09

## 2019-04-12 RX ADMIN — CLOPIDOGREL BISULFATE 75 MG: 75 TABLET ORAL at 09:28

## 2019-04-12 RX ADMIN — GABAPENTIN 800 MG: 400 CAPSULE ORAL at 09:28

## 2019-04-12 RX ADMIN — PAROXETINE 10 MG: 20 TABLET, FILM COATED ORAL at 09:29

## 2019-04-12 RX ADMIN — IPRATROPIUM BROMIDE AND ALBUTEROL SULFATE 3 ML: 2.5; .5 SOLUTION RESPIRATORY (INHALATION) at 20:10

## 2019-04-12 RX ADMIN — FELODIPINE 5 MG: 2.5 TABLET, FILM COATED, EXTENDED RELEASE ORAL at 09:28

## 2019-04-12 RX ADMIN — LEVOFLOXACIN 750 MG: 500 TABLET, FILM COATED ORAL at 21:16

## 2019-04-12 RX ADMIN — FUROSEMIDE 20 MG: 10 INJECTION, SOLUTION INTRAMUSCULAR; INTRAVENOUS at 09:39

## 2019-04-12 RX ADMIN — Medication 500 MG: at 09:30

## 2019-04-12 RX ADMIN — GABAPENTIN 800 MG: 400 CAPSULE ORAL at 21:17

## 2019-04-12 RX ADMIN — Medication 500 MG: at 18:36

## 2019-04-12 RX ADMIN — ALLOPURINOL 100 MG: 100 TABLET ORAL at 09:30

## 2019-04-13 ENCOUNTER — APPOINTMENT (INPATIENT)
Dept: RADIOLOGY | Facility: HOSPITAL | Age: 79
DRG: 177 | End: 2019-04-13
Payer: MEDICARE

## 2019-04-13 VITALS
HEIGHT: 73 IN | WEIGHT: 315 LBS | BODY MASS INDEX: 41.75 KG/M2 | HEART RATE: 70 BPM | SYSTOLIC BLOOD PRESSURE: 179 MMHG | RESPIRATION RATE: 18 BRPM | OXYGEN SATURATION: 91 % | TEMPERATURE: 98.2 F | DIASTOLIC BLOOD PRESSURE: 77 MMHG

## 2019-04-13 PROBLEM — N18.9 ACUTE KIDNEY INJURY SUPERIMPOSED ON CHRONIC KIDNEY DISEASE (HCC): Status: RESOLVED | Noted: 2017-06-27 | Resolved: 2019-04-13

## 2019-04-13 PROBLEM — N17.9 ACUTE KIDNEY INJURY SUPERIMPOSED ON CHRONIC KIDNEY DISEASE (HCC): Status: RESOLVED | Noted: 2017-06-27 | Resolved: 2019-04-13

## 2019-04-13 LAB
ANION GAP SERPL CALCULATED.3IONS-SCNC: 7 MMOL/L (ref 4–13)
BUN SERPL-MCNC: 42 MG/DL (ref 5–25)
CALCIUM SERPL-MCNC: 8.7 MG/DL (ref 8.3–10.1)
CHLORIDE SERPL-SCNC: 103 MMOL/L (ref 100–108)
CO2 SERPL-SCNC: 28 MMOL/L (ref 21–32)
CREAT SERPL-MCNC: 1.41 MG/DL (ref 0.6–1.3)
GFR SERPL CREATININE-BSD FRML MDRD: 47 ML/MIN/1.73SQ M
GLUCOSE SERPL-MCNC: 198 MG/DL (ref 65–140)
POTASSIUM SERPL-SCNC: 4.6 MMOL/L (ref 3.5–5.3)
PROCALCITONIN SERPL-MCNC: 0.09 NG/ML
SODIUM SERPL-SCNC: 138 MMOL/L (ref 136–145)

## 2019-04-13 PROCEDURE — 94760 N-INVAS EAR/PLS OXIMETRY 1: CPT

## 2019-04-13 PROCEDURE — 99232 SBSQ HOSP IP/OBS MODERATE 35: CPT | Performed by: INTERNAL MEDICINE

## 2019-04-13 PROCEDURE — 80048 BASIC METABOLIC PNL TOTAL CA: CPT | Performed by: INTERNAL MEDICINE

## 2019-04-13 PROCEDURE — G8987 SELF CARE CURRENT STATUS: HCPCS

## 2019-04-13 PROCEDURE — G8988 SELF CARE GOAL STATUS: HCPCS

## 2019-04-13 PROCEDURE — 94640 AIRWAY INHALATION TREATMENT: CPT

## 2019-04-13 PROCEDURE — 97110 THERAPEUTIC EXERCISES: CPT

## 2019-04-13 PROCEDURE — 97167 OT EVAL HIGH COMPLEX 60 MIN: CPT

## 2019-04-13 PROCEDURE — 99239 HOSP IP/OBS DSCHRG MGMT >30: CPT | Performed by: FAMILY MEDICINE

## 2019-04-13 PROCEDURE — 84145 PROCALCITONIN (PCT): CPT | Performed by: FAMILY MEDICINE

## 2019-04-13 PROCEDURE — 71045 X-RAY EXAM CHEST 1 VIEW: CPT

## 2019-04-13 RX ORDER — LEVOFLOXACIN 750 MG/1
750 TABLET ORAL EVERY 24 HOURS
Refills: 0
Start: 2019-04-13 | End: 2019-04-18

## 2019-04-13 RX ORDER — FUROSEMIDE 40 MG/1
40 TABLET ORAL 2 TIMES DAILY
Qty: 90 TABLET | Refills: 0
Start: 2019-04-13 | End: 2019-04-19

## 2019-04-13 RX ORDER — ALLOPURINOL 100 MG/1
100 TABLET ORAL DAILY
Start: 2019-04-13 | End: 2019-05-17

## 2019-04-13 RX ORDER — IPRATROPIUM BROMIDE AND ALBUTEROL SULFATE 2.5; .5 MG/3ML; MG/3ML
3 SOLUTION RESPIRATORY (INHALATION) EVERY 4 HOURS PRN
Refills: 0
Start: 2019-04-13 | End: 2019-09-19 | Stop reason: SDUPTHER

## 2019-04-13 RX ORDER — PREDNISONE 10 MG/1
TABLET ORAL
Qty: 30 TABLET | Refills: 0
Start: 2019-04-13 | End: 2019-05-13

## 2019-04-13 RX ORDER — PREDNISONE 10 MG/1
TABLET ORAL
Qty: 30 TABLET | Refills: 0
Start: 2019-04-13 | End: 2019-04-13 | Stop reason: SDUPTHER

## 2019-04-13 RX ORDER — LEVOFLOXACIN 750 MG/1
750 TABLET ORAL EVERY 24 HOURS
Refills: 0
Start: 2019-04-13 | End: 2019-04-13

## 2019-04-13 RX ORDER — IPRATROPIUM BROMIDE AND ALBUTEROL SULFATE 2.5; .5 MG/3ML; MG/3ML
3 SOLUTION RESPIRATORY (INHALATION)
Refills: 0
Start: 2019-04-13 | End: 2019-04-30 | Stop reason: SDUPTHER

## 2019-04-13 RX ADMIN — GABAPENTIN 800 MG: 400 CAPSULE ORAL at 16:24

## 2019-04-13 RX ADMIN — FUROSEMIDE 20 MG: 10 INJECTION, SOLUTION INTRAMUSCULAR; INTRAVENOUS at 16:24

## 2019-04-13 RX ADMIN — VITAM B12 100 MCG: 100 TAB at 09:17

## 2019-04-13 RX ADMIN — FINASTERIDE 5 MG: 5 TABLET, FILM COATED ORAL at 09:16

## 2019-04-13 RX ADMIN — GABAPENTIN 800 MG: 400 CAPSULE ORAL at 09:16

## 2019-04-13 RX ADMIN — IPRATROPIUM BROMIDE AND ALBUTEROL SULFATE 3 ML: 2.5; .5 SOLUTION RESPIRATORY (INHALATION) at 13:47

## 2019-04-13 RX ADMIN — CLOPIDOGREL BISULFATE 75 MG: 75 TABLET ORAL at 09:16

## 2019-04-13 RX ADMIN — ATORVASTATIN CALCIUM 40 MG: 40 TABLET, FILM COATED ORAL at 16:24

## 2019-04-13 RX ADMIN — IPRATROPIUM BROMIDE AND ALBUTEROL SULFATE 3 ML: 2.5; .5 SOLUTION RESPIRATORY (INHALATION) at 01:37

## 2019-04-13 RX ADMIN — FELODIPINE 5 MG: 2.5 TABLET, FILM COATED, EXTENDED RELEASE ORAL at 09:17

## 2019-04-13 RX ADMIN — Medication 500 MG: at 09:16

## 2019-04-13 RX ADMIN — IPRATROPIUM BROMIDE AND ALBUTEROL SULFATE 3 ML: 2.5; .5 SOLUTION RESPIRATORY (INHALATION) at 07:34

## 2019-04-13 RX ADMIN — FUROSEMIDE 20 MG: 10 INJECTION, SOLUTION INTRAMUSCULAR; INTRAVENOUS at 07:23

## 2019-04-13 RX ADMIN — ALLOPURINOL 100 MG: 100 TABLET ORAL at 09:17

## 2019-04-13 RX ADMIN — Medication 500 MG: at 17:06

## 2019-04-13 RX ADMIN — FLUTICASONE FUROATE AND VILANTEROL TRIFENATATE 1 PUFF: 100; 25 POWDER RESPIRATORY (INHALATION) at 07:23

## 2019-04-13 RX ADMIN — OXYCODONE HYDROCHLORIDE AND ACETAMINOPHEN 500 MG: 500 TABLET ORAL at 09:16

## 2019-04-13 RX ADMIN — RIVAROXABAN 15 MG: 15 TABLET, FILM COATED ORAL at 07:23

## 2019-04-13 RX ADMIN — PAROXETINE 10 MG: 20 TABLET, FILM COATED ORAL at 09:16

## 2019-04-13 RX ADMIN — METHYLPREDNISOLONE SODIUM SUCCINATE 20 MG: 40 INJECTION, POWDER, FOR SOLUTION INTRAMUSCULAR; INTRAVENOUS at 09:17

## 2019-04-15 ENCOUNTER — TELEPHONE (OUTPATIENT)
Dept: NEPHROLOGY | Facility: CLINIC | Age: 79
End: 2019-04-15

## 2019-04-15 ENCOUNTER — EPISODE CHANGES (OUTPATIENT)
Dept: CASE MANAGEMENT | Facility: HOSPITAL | Age: 79
End: 2019-04-15

## 2019-04-15 ENCOUNTER — TELEPHONE (OUTPATIENT)
Dept: FAMILY MEDICINE CLINIC | Facility: CLINIC | Age: 79
End: 2019-04-15

## 2019-04-15 DIAGNOSIS — N17.9 ACUTE KIDNEY INJURY (HCC): Primary | ICD-10-CM

## 2019-04-16 ENCOUNTER — TELEPHONE (OUTPATIENT)
Dept: CARDIOLOGY CLINIC | Facility: CLINIC | Age: 79
End: 2019-04-16

## 2019-04-16 ENCOUNTER — TELEPHONE (OUTPATIENT)
Dept: OTHER | Facility: HOSPITAL | Age: 79
End: 2019-04-16

## 2019-04-18 PROBLEM — N18.30 CKD (CHRONIC KIDNEY DISEASE) STAGE 3, GFR 30-59 ML/MIN (HCC): Status: ACTIVE | Noted: 2019-04-18

## 2019-04-19 ENCOUNTER — OFFICE VISIT (OUTPATIENT)
Dept: NEPHROLOGY | Facility: CLINIC | Age: 79
End: 2019-04-19
Payer: MEDICARE

## 2019-04-19 VITALS — BODY MASS INDEX: 41.75 KG/M2 | HEIGHT: 73 IN | WEIGHT: 315 LBS

## 2019-04-19 DIAGNOSIS — N17.9 ACUTE KIDNEY INJURY SUPERIMPOSED ON CHRONIC KIDNEY DISEASE (HCC): Primary | ICD-10-CM

## 2019-04-19 DIAGNOSIS — N18.9 ACUTE KIDNEY INJURY SUPERIMPOSED ON CHRONIC KIDNEY DISEASE (HCC): Primary | ICD-10-CM

## 2019-04-19 DIAGNOSIS — R60.0 BILATERAL LEG EDEMA: ICD-10-CM

## 2019-04-19 DIAGNOSIS — N18.30 CKD (CHRONIC KIDNEY DISEASE) STAGE 3, GFR 30-59 ML/MIN (HCC): ICD-10-CM

## 2019-04-19 PROCEDURE — 99214 OFFICE O/P EST MOD 30 MIN: CPT | Performed by: INTERNAL MEDICINE

## 2019-04-19 RX ORDER — FUROSEMIDE 40 MG/1
40 TABLET ORAL DAILY
Qty: 90 TABLET | Refills: 1 | Status: SHIPPED | OUTPATIENT
Start: 2019-04-19 | End: 2019-05-13

## 2019-04-24 DIAGNOSIS — J44.1 COPD EXACERBATION (HCC): Primary | ICD-10-CM

## 2019-04-24 RX ORDER — ALBUTEROL SULFATE 2.5 MG/3ML
2.5 SOLUTION RESPIRATORY (INHALATION) EVERY 6 HOURS PRN
Qty: 120 VIAL | Refills: 0 | Status: SHIPPED | OUTPATIENT
Start: 2019-04-24 | End: 2019-09-19 | Stop reason: ALTCHOICE

## 2019-04-25 ENCOUNTER — TELEPHONE (OUTPATIENT)
Dept: NEPHROLOGY | Facility: CLINIC | Age: 79
End: 2019-04-25

## 2019-04-25 ENCOUNTER — TELEPHONE (OUTPATIENT)
Dept: FAMILY MEDICINE CLINIC | Facility: CLINIC | Age: 79
End: 2019-04-25

## 2019-04-25 ENCOUNTER — TELEPHONE (OUTPATIENT)
Dept: PULMONOLOGY | Facility: MEDICAL CENTER | Age: 79
End: 2019-04-25

## 2019-04-29 ENCOUNTER — HOSPITAL ENCOUNTER (EMERGENCY)
Facility: HOSPITAL | Age: 79
Discharge: HOME | End: 2019-04-29
Attending: EMERGENCY MEDICINE
Payer: MEDICARE

## 2019-04-29 ENCOUNTER — TELEPHONE (OUTPATIENT)
Dept: FAMILY MEDICINE CLINIC | Facility: CLINIC | Age: 79
End: 2019-04-29

## 2019-04-29 ENCOUNTER — APPOINTMENT (EMERGENCY)
Dept: RADIOLOGY | Facility: HOSPITAL | Age: 79
End: 2019-04-29
Attending: EMERGENCY MEDICINE
Payer: MEDICARE

## 2019-04-29 VITALS
TEMPERATURE: 97 F | BODY MASS INDEX: 47.23 KG/M2 | SYSTOLIC BLOOD PRESSURE: 115 MMHG | HEART RATE: 70 BPM | OXYGEN SATURATION: 92 % | DIASTOLIC BLOOD PRESSURE: 59 MMHG | RESPIRATION RATE: 24 BRPM | WEIGHT: 315 LBS

## 2019-04-29 DIAGNOSIS — M79.89 LEG SWELLING: Primary | ICD-10-CM

## 2019-04-29 PROCEDURE — 93971 EXTREMITY STUDY: CPT

## 2019-04-29 PROCEDURE — 99283 EMERGENCY DEPT VISIT LOW MDM: CPT

## 2019-04-29 PROCEDURE — 93971 EXTREMITY STUDY: CPT | Performed by: SURGERY

## 2019-04-30 ENCOUNTER — OFFICE VISIT (OUTPATIENT)
Dept: FAMILY MEDICINE CLINIC | Facility: CLINIC | Age: 79
End: 2019-04-30
Payer: MEDICARE

## 2019-04-30 VITALS
SYSTOLIC BLOOD PRESSURE: 122 MMHG | HEART RATE: 70 BPM | RESPIRATION RATE: 20 BRPM | DIASTOLIC BLOOD PRESSURE: 62 MMHG | TEMPERATURE: 99 F | HEIGHT: 73 IN | OXYGEN SATURATION: 93 % | WEIGHT: 315 LBS | BODY MASS INDEX: 41.75 KG/M2

## 2019-04-30 DIAGNOSIS — I25.10 CORONARY ARTERY ARTERIOSCLEROSIS: ICD-10-CM

## 2019-04-30 DIAGNOSIS — N42.9 PROSTATE DISORDER: ICD-10-CM

## 2019-04-30 DIAGNOSIS — J44.1 COPD EXACERBATION (HCC): ICD-10-CM

## 2019-04-30 DIAGNOSIS — I87.2 VENOUS INSUFFICIENCY (CHRONIC) (PERIPHERAL): Primary | ICD-10-CM

## 2019-04-30 DIAGNOSIS — I10 ESSENTIAL HYPERTENSION: ICD-10-CM

## 2019-04-30 PROCEDURE — 99214 OFFICE O/P EST MOD 30 MIN: CPT | Performed by: FAMILY MEDICINE

## 2019-04-30 RX ORDER — PREDNISONE 1 MG/1
TABLET ORAL
Refills: 0 | COMMUNITY
Start: 2019-04-26 | End: 2019-05-13

## 2019-04-30 RX ORDER — CLOPIDOGREL BISULFATE 75 MG/1
75 TABLET ORAL DAILY
Qty: 90 TABLET | Refills: 3 | Status: SHIPPED | OUTPATIENT
Start: 2019-04-30 | End: 2019-07-09 | Stop reason: SDUPTHER

## 2019-04-30 RX ORDER — FELODIPINE 5 MG/1
5 TABLET, EXTENDED RELEASE ORAL DAILY
Qty: 90 TABLET | Refills: 3 | Status: SHIPPED | OUTPATIENT
Start: 2019-04-30 | End: 2019-07-09 | Stop reason: SDUPTHER

## 2019-04-30 RX ORDER — FINASTERIDE 5 MG/1
5 TABLET, FILM COATED ORAL DAILY
Qty: 90 TABLET | Refills: 3 | Status: SHIPPED | OUTPATIENT
Start: 2019-04-30 | End: 2019-07-09 | Stop reason: SDUPTHER

## 2019-04-30 RX ORDER — SIMVASTATIN 20 MG
20 TABLET ORAL
Qty: 90 TABLET | Refills: 3 | Status: SHIPPED | OUTPATIENT
Start: 2019-04-30 | End: 2019-07-09 | Stop reason: SDUPTHER

## 2019-04-30 RX ORDER — PREDNISONE 10 MG/1
TABLET ORAL
Refills: 0 | COMMUNITY
Start: 2019-04-26 | End: 2019-05-13

## 2019-04-30 RX ORDER — IPRATROPIUM BROMIDE AND ALBUTEROL SULFATE 2.5; .5 MG/3ML; MG/3ML
3 SOLUTION RESPIRATORY (INHALATION)
Qty: 60 VIAL | Refills: 10 | Status: SHIPPED | OUTPATIENT
Start: 2019-04-30 | End: 2019-06-07 | Stop reason: SDUPTHER

## 2019-05-03 ENCOUNTER — TELEPHONE (OUTPATIENT)
Dept: FAMILY MEDICINE CLINIC | Facility: CLINIC | Age: 79
End: 2019-05-03

## 2019-05-06 ENCOUNTER — OFFICE VISIT (OUTPATIENT)
Dept: CARDIOLOGY CLINIC | Facility: CLINIC | Age: 79
End: 2019-05-06
Payer: MEDICARE

## 2019-05-06 VITALS
OXYGEN SATURATION: 96 % | HEART RATE: 70 BPM | DIASTOLIC BLOOD PRESSURE: 84 MMHG | HEIGHT: 73 IN | BODY MASS INDEX: 41.75 KG/M2 | SYSTOLIC BLOOD PRESSURE: 140 MMHG | WEIGHT: 315 LBS

## 2019-05-06 DIAGNOSIS — I25.10 ARTERIOSCLEROSIS OF CORONARY ARTERY: ICD-10-CM

## 2019-05-06 DIAGNOSIS — I48.20 CHRONIC A-FIB (HCC): ICD-10-CM

## 2019-05-06 DIAGNOSIS — J44.9 CHRONIC OBSTRUCTIVE PULMONARY DISEASE, UNSPECIFIED COPD TYPE (HCC): ICD-10-CM

## 2019-05-06 DIAGNOSIS — N18.30 CKD (CHRONIC KIDNEY DISEASE) STAGE 3, GFR 30-59 ML/MIN (HCC): ICD-10-CM

## 2019-05-06 DIAGNOSIS — I20.9 ANGINA PECTORIS (HCC): ICD-10-CM

## 2019-05-06 DIAGNOSIS — N18.30 BENIGN HYPERTENSION WITH CHRONIC KIDNEY DISEASE, STAGE III (HCC): ICD-10-CM

## 2019-05-06 DIAGNOSIS — I12.9 BENIGN HYPERTENSION WITH CHRONIC KIDNEY DISEASE, STAGE III (HCC): ICD-10-CM

## 2019-05-06 DIAGNOSIS — I10 ESSENTIAL HYPERTENSION: ICD-10-CM

## 2019-05-06 PROCEDURE — 93000 ELECTROCARDIOGRAM COMPLETE: CPT | Performed by: INTERNAL MEDICINE

## 2019-05-06 PROCEDURE — 99214 OFFICE O/P EST MOD 30 MIN: CPT | Performed by: INTERNAL MEDICINE

## 2019-05-07 ENCOUNTER — TELEPHONE (OUTPATIENT)
Dept: FAMILY MEDICINE CLINIC | Facility: CLINIC | Age: 79
End: 2019-05-07

## 2019-05-07 ENCOUNTER — HOSPITAL ENCOUNTER (EMERGENCY)
Facility: HOSPITAL | Age: 79
Discharge: HOME | End: 2019-05-07
Attending: EMERGENCY MEDICINE
Payer: MEDICARE

## 2019-05-07 VITALS
TEMPERATURE: 98.7 F | RESPIRATION RATE: 18 BRPM | HEART RATE: 75 BPM | DIASTOLIC BLOOD PRESSURE: 61 MMHG | OXYGEN SATURATION: 97 % | SYSTOLIC BLOOD PRESSURE: 141 MMHG

## 2019-05-07 DIAGNOSIS — S01.512A TONGUE LACERATION, INITIAL ENCOUNTER: Primary | ICD-10-CM

## 2019-05-07 PROCEDURE — 99282 EMERGENCY DEPT VISIT SF MDM: CPT

## 2019-05-08 ENCOUNTER — VBI (OUTPATIENT)
Dept: FAMILY MEDICINE CLINIC | Facility: CLINIC | Age: 79
End: 2019-05-08

## 2019-05-08 ENCOUNTER — OFFICE VISIT (OUTPATIENT)
Dept: PULMONOLOGY | Facility: MEDICAL CENTER | Age: 79
End: 2019-05-08
Payer: MEDICARE

## 2019-05-08 ENCOUNTER — TRANSCRIBE ORDERS (OUTPATIENT)
Dept: ADMINISTRATIVE | Facility: HOSPITAL | Age: 79
End: 2019-05-08

## 2019-05-08 ENCOUNTER — HOSPITAL ENCOUNTER (OUTPATIENT)
Dept: RADIOLOGY | Facility: HOSPITAL | Age: 79
Discharge: HOME | End: 2019-05-08
Payer: MEDICARE

## 2019-05-08 VITALS
OXYGEN SATURATION: 95 % | DIASTOLIC BLOOD PRESSURE: 76 MMHG | BODY MASS INDEX: 41.75 KG/M2 | HEIGHT: 73 IN | HEART RATE: 70 BPM | TEMPERATURE: 99 F | RESPIRATION RATE: 12 BRPM | SYSTOLIC BLOOD PRESSURE: 122 MMHG | WEIGHT: 315 LBS

## 2019-05-08 DIAGNOSIS — J18.9 PNEUMONIA OF RIGHT UPPER LOBE DUE TO INFECTIOUS ORGANISM: ICD-10-CM

## 2019-05-08 DIAGNOSIS — J96.01 ACUTE HYPOXEMIC RESPIRATORY FAILURE (HCC): ICD-10-CM

## 2019-05-08 DIAGNOSIS — R94.2 RESTRICTIVE VENTILATORY DEFECT: ICD-10-CM

## 2019-05-08 DIAGNOSIS — G47.30 SLEEP APNEA, UNSPECIFIED TYPE: ICD-10-CM

## 2019-05-08 DIAGNOSIS — Z87.891 FORMER SMOKER: Primary | ICD-10-CM

## 2019-05-08 PROBLEM — J44.1 COPD EXACERBATION (HCC): Status: RESOLVED | Noted: 2019-04-08 | Resolved: 2019-05-08

## 2019-05-08 PROCEDURE — 99214 OFFICE O/P EST MOD 30 MIN: CPT | Performed by: PHYSICIAN ASSISTANT

## 2019-05-08 PROCEDURE — 71046 X-RAY EXAM CHEST 2 VIEWS: CPT

## 2019-05-08 PROCEDURE — 94010 BREATHING CAPACITY TEST: CPT | Performed by: PHYSICIAN ASSISTANT

## 2019-05-09 ENCOUNTER — TELEPHONE (OUTPATIENT)
Dept: FAMILY MEDICINE CLINIC | Facility: CLINIC | Age: 79
End: 2019-05-09

## 2019-05-09 LAB
BASOPHILS # BLD AUTO: 0 X10E3/UL (ref 0–0.2)
BASOPHILS NFR BLD AUTO: 1 %
BUN SERPL-MCNC: 31 MG/DL (ref 8–27)
BUN/CREAT SERPL: 18 (ref 10–24)
CALCIUM SERPL-MCNC: 9.4 MG/DL (ref 8.6–10.2)
CHLORIDE SERPL-SCNC: 99 MMOL/L (ref 96–106)
CO2 SERPL-SCNC: 24 MMOL/L (ref 20–29)
CREAT SERPL-MCNC: 1.69 MG/DL (ref 0.76–1.27)
EOSINOPHIL # BLD AUTO: 0.1 X10E3/UL (ref 0–0.4)
EOSINOPHIL NFR BLD AUTO: 3 %
ERYTHROCYTE [DISTWIDTH] IN BLOOD BY AUTOMATED COUNT: 14.9 % (ref 12.3–15.4)
GLUCOSE SERPL-MCNC: 141 MG/DL (ref 65–99)
HCT VFR BLD AUTO: 37.8 % (ref 37.5–51)
HGB BLD-MCNC: 12.9 G/DL (ref 13–17.7)
IMM GRANULOCYTES # BLD: 0.1 X10E3/UL (ref 0–0.1)
IMM GRANULOCYTES NFR BLD: 1 %
INR PPP: 1.1 (ref 0.8–1.2)
LABCORP COMMENT: NORMAL
LYMPHOCYTES # BLD AUTO: 1.1 X10E3/UL (ref 0.7–3.1)
LYMPHOCYTES NFR BLD AUTO: 20 %
MCH RBC QN AUTO: 31 PG (ref 26.6–33)
MCHC RBC AUTO-ENTMCNC: 34.1 G/DL (ref 31.5–35.7)
MCV RBC AUTO: 91 FL (ref 79–97)
MONOCYTES # BLD AUTO: 0.6 X10E3/UL (ref 0.1–0.9)
MONOCYTES NFR BLD AUTO: 11 %
NEUTROPHILS # BLD AUTO: 3.7 X10E3/UL (ref 1.4–7)
NEUTROPHILS NFR BLD AUTO: 64 %
PLATELET # BLD AUTO: 148 X10E3/UL (ref 150–379)
POTASSIUM SERPL-SCNC: 4.1 MMOL/L (ref 3.5–5.2)
PROTHROMBIN TIME: 11.7 SEC (ref 9.1–12)
RBC # BLD AUTO: 4.16 X10E6/UL (ref 4.14–5.8)
SL AMB EGFR AFRICAN AMERICAN: 44 ML/MIN/1.73
SL AMB EGFR NON AFRICAN AMERICAN: 38 ML/MIN/1.73
SODIUM SERPL-SCNC: 140 MMOL/L (ref 134–144)
WBC # BLD AUTO: 5.7 X10E3/UL (ref 3.4–10.8)

## 2019-05-10 RX ORDER — ASPIRIN 81 MG/1
324 TABLET, CHEWABLE ORAL ONCE
Status: CANCELLED | OUTPATIENT
Start: 2019-05-10 | End: 2019-05-10

## 2019-05-10 RX ORDER — SODIUM CHLORIDE 9 MG/ML
125 INJECTION, SOLUTION INTRAVENOUS CONTINUOUS
Status: CANCELLED | OUTPATIENT
Start: 2019-05-10

## 2019-05-13 ENCOUNTER — HOSPITAL ENCOUNTER (OUTPATIENT)
Dept: NON INVASIVE DIAGNOSTICS | Facility: HOSPITAL | Age: 79
Discharge: HOME | End: 2019-05-14
Attending: INTERNAL MEDICINE | Admitting: INTERNAL MEDICINE
Payer: MEDICARE

## 2019-05-13 DIAGNOSIS — I20.9 ANGINA PECTORIS (HCC): ICD-10-CM

## 2019-05-13 DIAGNOSIS — N18.30 CKD (CHRONIC KIDNEY DISEASE) STAGE 3, GFR 30-59 ML/MIN (HCC): ICD-10-CM

## 2019-05-13 DIAGNOSIS — I25.10 CAD (CORONARY ARTERY DISEASE): Primary | Chronic | ICD-10-CM

## 2019-05-13 DIAGNOSIS — Z95.5 STATUS POST PRIMARY ANGIOPLASTY WITH CORONARY STENT: Chronic | ICD-10-CM

## 2019-05-13 DIAGNOSIS — N18.30 BENIGN HYPERTENSION WITH CHRONIC KIDNEY DISEASE, STAGE III (HCC): ICD-10-CM

## 2019-05-13 DIAGNOSIS — I25.10 ARTERIOSCLEROSIS OF CORONARY ARTERY: ICD-10-CM

## 2019-05-13 DIAGNOSIS — I12.9 BENIGN HYPERTENSION WITH CHRONIC KIDNEY DISEASE, STAGE III (HCC): ICD-10-CM

## 2019-05-13 LAB
ANION GAP SERPL CALCULATED.3IONS-SCNC: 7 MMOL/L (ref 4–13)
ATRIAL RATE: 72 BPM
BUN SERPL-MCNC: 20 MG/DL (ref 5–25)
CALCIUM SERPL-MCNC: 9.7 MG/DL (ref 8.3–10.1)
CHLORIDE SERPL-SCNC: 103 MMOL/L (ref 100–108)
CHOLEST SERPL-MCNC: 123 MG/DL (ref 50–200)
CO2 SERPL-SCNC: 28 MMOL/L (ref 21–32)
CREAT SERPL-MCNC: 1.52 MG/DL (ref 0.6–1.3)
ERYTHROCYTE [DISTWIDTH] IN BLOOD BY AUTOMATED COUNT: 14.2 % (ref 11.6–15.1)
GFR SERPL CREATININE-BSD FRML MDRD: 43 ML/MIN/1.73SQ M
GLUCOSE P FAST SERPL-MCNC: 153 MG/DL (ref 65–99)
GLUCOSE SERPL-MCNC: 153 MG/DL (ref 65–140)
HCT VFR BLD AUTO: 36.6 % (ref 36.5–49.3)
HDLC SERPL-MCNC: 38 MG/DL (ref 40–60)
HGB BLD-MCNC: 12.3 G/DL (ref 12–17)
INR PPP: 0.96 (ref 0.86–1.17)
LDLC SERPL CALC-MCNC: 62 MG/DL (ref 0–100)
MAGNESIUM SERPL-MCNC: 2.1 MG/DL (ref 1.6–2.6)
MCH RBC QN AUTO: 31.4 PG (ref 26.8–34.3)
MCHC RBC AUTO-ENTMCNC: 33.6 G/DL (ref 31.4–37.4)
MCV RBC AUTO: 93 FL (ref 82–98)
NONHDLC SERPL-MCNC: 85 MG/DL
PLATELET # BLD AUTO: 151 THOUSANDS/UL (ref 149–390)
PMV BLD AUTO: 9 FL (ref 8.9–12.7)
POTASSIUM SERPL-SCNC: 3.7 MMOL/L (ref 3.5–5.3)
PROTHROMBIN TIME: 12.5 SECONDS (ref 11.8–14.2)
QRS AXIS: -69 DEGREES
QRSD INTERVAL: 168 MS
QT INTERVAL: 450 MS
QTC INTERVAL: 486 MS
RBC # BLD AUTO: 3.92 MILLION/UL (ref 3.88–5.62)
SODIUM SERPL-SCNC: 138 MMOL/L (ref 136–145)
T WAVE AXIS: 102 DEGREES
TRIGL SERPL-MCNC: 117 MG/DL
VENTRICULAR RATE: 70 BPM
WBC # BLD AUTO: 4.59 THOUSAND/UL (ref 4.31–10.16)

## 2019-05-13 PROCEDURE — C9600 PERC DRUG-EL COR STENT SING: HCPCS | Performed by: INTERNAL MEDICINE

## 2019-05-13 PROCEDURE — 99152 MOD SED SAME PHYS/QHP 5/>YRS: CPT | Performed by: INTERNAL MEDICINE

## 2019-05-13 PROCEDURE — C1874 STENT, COATED/COV W/DEL SYS: HCPCS

## 2019-05-13 PROCEDURE — 83735 ASSAY OF MAGNESIUM: CPT | Performed by: INTERNAL MEDICINE

## 2019-05-13 PROCEDURE — 93005 ELECTROCARDIOGRAM TRACING: CPT

## 2019-05-13 PROCEDURE — C1769 GUIDE WIRE: HCPCS | Performed by: INTERNAL MEDICINE

## 2019-05-13 PROCEDURE — 93454 CORONARY ARTERY ANGIO S&I: CPT | Performed by: INTERNAL MEDICINE

## 2019-05-13 PROCEDURE — C1887 CATHETER, GUIDING: HCPCS | Performed by: INTERNAL MEDICINE

## 2019-05-13 PROCEDURE — 85027 COMPLETE CBC AUTOMATED: CPT | Performed by: INTERNAL MEDICINE

## 2019-05-13 PROCEDURE — 99153 MOD SED SAME PHYS/QHP EA: CPT | Performed by: INTERNAL MEDICINE

## 2019-05-13 PROCEDURE — C1894 INTRO/SHEATH, NON-LASER: HCPCS | Performed by: INTERNAL MEDICINE

## 2019-05-13 PROCEDURE — 99214 OFFICE O/P EST MOD 30 MIN: CPT | Performed by: INTERNAL MEDICINE

## 2019-05-13 PROCEDURE — C1725 CATH, TRANSLUMIN NON-LASER: HCPCS | Performed by: INTERNAL MEDICINE

## 2019-05-13 PROCEDURE — 80048 BASIC METABOLIC PNL TOTAL CA: CPT | Performed by: INTERNAL MEDICINE

## 2019-05-13 PROCEDURE — 92928 PRQ TCAT PLMT NTRAC ST 1 LES: CPT | Performed by: INTERNAL MEDICINE

## 2019-05-13 PROCEDURE — 1123F ACP DISCUSS/DSCN MKR DOCD: CPT | Performed by: INTERNAL MEDICINE

## 2019-05-13 PROCEDURE — 93010 ELECTROCARDIOGRAM REPORT: CPT | Performed by: INTERNAL MEDICINE

## 2019-05-13 PROCEDURE — 85610 PROTHROMBIN TIME: CPT | Performed by: INTERNAL MEDICINE

## 2019-05-13 PROCEDURE — 80061 LIPID PANEL: CPT | Performed by: INTERNAL MEDICINE

## 2019-05-13 RX ORDER — NITROGLYCERIN 20 MG/100ML
INJECTION INTRAVENOUS CODE/TRAUMA/SEDATION MEDICATION
Status: COMPLETED | OUTPATIENT
Start: 2019-05-13 | End: 2019-05-13

## 2019-05-13 RX ORDER — MAGNESIUM HYDROXIDE/ALUMINUM HYDROXICE/SIMETHICONE 120; 1200; 1200 MG/30ML; MG/30ML; MG/30ML
30 SUSPENSION ORAL EVERY 6 HOURS PRN
Status: DISCONTINUED | OUTPATIENT
Start: 2019-05-13 | End: 2019-05-14 | Stop reason: HOSPADM

## 2019-05-13 RX ORDER — GABAPENTIN 400 MG/1
800 CAPSULE ORAL 3 TIMES DAILY
Status: DISCONTINUED | OUTPATIENT
Start: 2019-05-13 | End: 2019-05-14 | Stop reason: HOSPADM

## 2019-05-13 RX ORDER — FUROSEMIDE 40 MG/1
40 TABLET ORAL 2 TIMES DAILY
COMMUNITY
End: 2019-07-09 | Stop reason: SDUPTHER

## 2019-05-13 RX ORDER — SODIUM CHLORIDE 9 MG/ML
100 INJECTION, SOLUTION INTRAVENOUS CONTINUOUS
Status: DISPENSED | OUTPATIENT
Start: 2019-05-13 | End: 2019-05-13

## 2019-05-13 RX ORDER — CLOPIDOGREL BISULFATE 75 MG/1
TABLET ORAL CODE/TRAUMA/SEDATION MEDICATION
Status: COMPLETED | OUTPATIENT
Start: 2019-05-13 | End: 2019-05-13

## 2019-05-13 RX ORDER — METOPROLOL TARTRATE 50 MG/1
25 TABLET, FILM COATED ORAL EVERY 12 HOURS SCHEDULED
Status: DISCONTINUED | OUTPATIENT
Start: 2019-05-13 | End: 2019-05-14 | Stop reason: HOSPADM

## 2019-05-13 RX ORDER — MIDAZOLAM HYDROCHLORIDE 1 MG/ML
INJECTION INTRAMUSCULAR; INTRAVENOUS CODE/TRAUMA/SEDATION MEDICATION
Status: COMPLETED | OUTPATIENT
Start: 2019-05-13 | End: 2019-05-13

## 2019-05-13 RX ORDER — VERAPAMIL HYDROCHLORIDE 2.5 MG/ML
INJECTION, SOLUTION INTRAVENOUS CODE/TRAUMA/SEDATION MEDICATION
Status: COMPLETED | OUTPATIENT
Start: 2019-05-13 | End: 2019-05-13

## 2019-05-13 RX ORDER — FELODIPINE 2.5 MG/1
5 TABLET, EXTENDED RELEASE ORAL DAILY
Status: DISCONTINUED | OUTPATIENT
Start: 2019-05-13 | End: 2019-05-14 | Stop reason: HOSPADM

## 2019-05-13 RX ORDER — FLUTICASONE FUROATE AND VILANTEROL 200; 25 UG/1; UG/1
1 POWDER RESPIRATORY (INHALATION) DAILY
Status: DISCONTINUED | OUTPATIENT
Start: 2019-05-13 | End: 2019-05-14 | Stop reason: HOSPADM

## 2019-05-13 RX ORDER — SODIUM CHLORIDE 9 MG/ML
125 INJECTION, SOLUTION INTRAVENOUS CONTINUOUS
Status: DISCONTINUED | OUTPATIENT
Start: 2019-05-13 | End: 2019-05-13

## 2019-05-13 RX ORDER — OXYCODONE HYDROCHLORIDE AND ACETAMINOPHEN 5; 325 MG/1; MG/1
1 TABLET ORAL AS NEEDED
Status: DISCONTINUED | OUTPATIENT
Start: 2019-05-13 | End: 2019-05-14 | Stop reason: HOSPADM

## 2019-05-13 RX ORDER — ALLOPURINOL 100 MG/1
100 TABLET ORAL DAILY
Status: DISCONTINUED | OUTPATIENT
Start: 2019-05-13 | End: 2019-05-14 | Stop reason: HOSPADM

## 2019-05-13 RX ORDER — ASPIRIN 81 MG/1
324 TABLET, CHEWABLE ORAL ONCE
Status: COMPLETED | OUTPATIENT
Start: 2019-05-13 | End: 2019-05-13

## 2019-05-13 RX ORDER — PRAVASTATIN SODIUM 20 MG
20 TABLET ORAL
Status: DISCONTINUED | OUTPATIENT
Start: 2019-05-13 | End: 2019-05-14 | Stop reason: HOSPADM

## 2019-05-13 RX ORDER — CLOPIDOGREL BISULFATE 75 MG/1
75 TABLET ORAL DAILY
Status: DISCONTINUED | OUTPATIENT
Start: 2019-05-14 | End: 2019-05-14 | Stop reason: HOSPADM

## 2019-05-13 RX ORDER — TRAMADOL HYDROCHLORIDE 50 MG/1
50 TABLET ORAL EVERY 6 HOURS PRN
Status: DISCONTINUED | OUTPATIENT
Start: 2019-05-13 | End: 2019-05-14 | Stop reason: HOSPADM

## 2019-05-13 RX ORDER — LIDOCAINE HYDROCHLORIDE 10 MG/ML
INJECTION, SOLUTION INFILTRATION; PERINEURAL CODE/TRAUMA/SEDATION MEDICATION
Status: COMPLETED | OUTPATIENT
Start: 2019-05-13 | End: 2019-05-13

## 2019-05-13 RX ORDER — ONDANSETRON 2 MG/ML
4 INJECTION INTRAMUSCULAR; INTRAVENOUS EVERY 6 HOURS PRN
Status: DISCONTINUED | OUTPATIENT
Start: 2019-05-13 | End: 2019-05-14 | Stop reason: HOSPADM

## 2019-05-13 RX ORDER — ALBUTEROL SULFATE 2.5 MG/3ML
2.5 SOLUTION RESPIRATORY (INHALATION) EVERY 6 HOURS PRN
Status: DISCONTINUED | OUTPATIENT
Start: 2019-05-13 | End: 2019-05-14 | Stop reason: HOSPADM

## 2019-05-13 RX ORDER — ASPIRIN 81 MG/1
81 TABLET ORAL DAILY
Status: DISCONTINUED | OUTPATIENT
Start: 2019-05-14 | End: 2019-05-14 | Stop reason: HOSPADM

## 2019-05-13 RX ORDER — FINASTERIDE 5 MG/1
5 TABLET, FILM COATED ORAL DAILY
Status: DISCONTINUED | OUTPATIENT
Start: 2019-05-13 | End: 2019-05-14 | Stop reason: HOSPADM

## 2019-05-13 RX ORDER — FENTANYL CITRATE 50 UG/ML
INJECTION, SOLUTION INTRAMUSCULAR; INTRAVENOUS CODE/TRAUMA/SEDATION MEDICATION
Status: COMPLETED | OUTPATIENT
Start: 2019-05-13 | End: 2019-05-13

## 2019-05-13 RX ORDER — IPRATROPIUM BROMIDE AND ALBUTEROL SULFATE 2.5; .5 MG/3ML; MG/3ML
3 SOLUTION RESPIRATORY (INHALATION) EVERY 4 HOURS PRN
Status: DISCONTINUED | OUTPATIENT
Start: 2019-05-13 | End: 2019-05-14 | Stop reason: HOSPADM

## 2019-05-13 RX ORDER — PAROXETINE 10 MG/1
10 TABLET, FILM COATED ORAL DAILY
Status: DISCONTINUED | OUTPATIENT
Start: 2019-05-13 | End: 2019-05-14 | Stop reason: HOSPADM

## 2019-05-13 RX ADMIN — METOPROLOL TARTRATE 25 MG: 50 TABLET, FILM COATED ORAL at 11:25

## 2019-05-13 RX ADMIN — ASPIRIN 81 MG 324 MG: 81 TABLET ORAL at 07:29

## 2019-05-13 RX ADMIN — IOHEXOL 110 ML: 350 INJECTION, SOLUTION INTRAVENOUS at 08:54

## 2019-05-13 RX ADMIN — Medication 1.75 MG/KG/HR: at 08:38

## 2019-05-13 RX ADMIN — METOPROLOL TARTRATE 25 MG: 50 TABLET, FILM COATED ORAL at 21:10

## 2019-05-13 RX ADMIN — VERAPAMIL HYDROCHLORIDE 2.5 MG: 2.5 INJECTION, SOLUTION INTRAVENOUS at 08:31

## 2019-05-13 RX ADMIN — SODIUM CHLORIDE 100 ML/HR: 0.9 INJECTION, SOLUTION INTRAVENOUS at 10:00

## 2019-05-13 RX ADMIN — FENTANYL CITRATE 25 MCG: 50 INJECTION INTRAMUSCULAR; INTRAVENOUS at 08:24

## 2019-05-13 RX ADMIN — FENTANYL CITRATE 25 MCG: 50 INJECTION INTRAMUSCULAR; INTRAVENOUS at 08:19

## 2019-05-13 RX ADMIN — MIDAZOLAM HYDROCHLORIDE 1 MG: 1 INJECTION, SOLUTION INTRAMUSCULAR; INTRAVENOUS at 08:24

## 2019-05-13 RX ADMIN — CLOPIDOGREL 75 MG: 75 TABLET, FILM COATED ORAL at 08:14

## 2019-05-13 RX ADMIN — PRAVASTATIN SODIUM 20 MG: 20 TABLET ORAL at 16:09

## 2019-05-13 RX ADMIN — PAROXETINE 10 MG: 10 TABLET, FILM COATED ORAL at 12:21

## 2019-05-13 RX ADMIN — MIDAZOLAM HYDROCHLORIDE 1 MG: 1 INJECTION, SOLUTION INTRAMUSCULAR; INTRAVENOUS at 08:19

## 2019-05-13 RX ADMIN — GABAPENTIN 800 MG: 400 CAPSULE ORAL at 11:24

## 2019-05-13 RX ADMIN — FENTANYL CITRATE 50 MCG: 50 INJECTION INTRAMUSCULAR; INTRAVENOUS at 08:50

## 2019-05-13 RX ADMIN — NITROGLYCERIN 200 MCG: 20 INJECTION INTRAVENOUS at 08:29

## 2019-05-13 RX ADMIN — LIDOCAINE HYDROCHLORIDE ANHYDROUS 2 ML: 10 INJECTION, SOLUTION INFILTRATION at 08:26

## 2019-05-13 RX ADMIN — ALLOPURINOL 100 MG: 100 TABLET ORAL at 11:25

## 2019-05-13 RX ADMIN — FINASTERIDE 5 MG: 5 TABLET, FILM COATED ORAL at 11:24

## 2019-05-13 RX ADMIN — FELODIPINE 5 MG: 2.5 TABLET, FILM COATED, EXTENDED RELEASE ORAL at 11:24

## 2019-05-13 RX ADMIN — FLUTICASONE FUROATE AND VILANTEROL TRIFENATATE 1 PUFF: 200; 25 POWDER RESPIRATORY (INHALATION) at 12:21

## 2019-05-13 RX ADMIN — GABAPENTIN 800 MG: 400 CAPSULE ORAL at 16:09

## 2019-05-13 RX ADMIN — GABAPENTIN 800 MG: 400 CAPSULE ORAL at 21:10

## 2019-05-14 VITALS
OXYGEN SATURATION: 96 % | TEMPERATURE: 97.7 F | SYSTOLIC BLOOD PRESSURE: 113 MMHG | BODY MASS INDEX: 41.75 KG/M2 | RESPIRATION RATE: 18 BRPM | HEART RATE: 70 BPM | DIASTOLIC BLOOD PRESSURE: 74 MMHG | WEIGHT: 315 LBS | HEIGHT: 73 IN

## 2019-05-14 LAB
ANION GAP SERPL CALCULATED.3IONS-SCNC: 10 MMOL/L (ref 4–13)
BUN SERPL-MCNC: 22 MG/DL (ref 5–25)
CALCIUM SERPL-MCNC: 9.3 MG/DL (ref 8.3–10.1)
CHLORIDE SERPL-SCNC: 103 MMOL/L (ref 100–108)
CO2 SERPL-SCNC: 27 MMOL/L (ref 21–32)
CREAT SERPL-MCNC: 1.7 MG/DL (ref 0.6–1.3)
ERYTHROCYTE [DISTWIDTH] IN BLOOD BY AUTOMATED COUNT: 14.2 % (ref 11.6–15.1)
GFR SERPL CREATININE-BSD FRML MDRD: 38 ML/MIN/1.73SQ M
GLUCOSE P FAST SERPL-MCNC: 129 MG/DL (ref 65–99)
GLUCOSE SERPL-MCNC: 129 MG/DL (ref 65–140)
HCT VFR BLD AUTO: 37.7 % (ref 36.5–49.3)
HGB BLD-MCNC: 12.2 G/DL (ref 12–17)
MCH RBC QN AUTO: 31 PG (ref 26.8–34.3)
MCHC RBC AUTO-ENTMCNC: 32.4 G/DL (ref 31.4–37.4)
MCV RBC AUTO: 96 FL (ref 82–98)
PLATELET # BLD AUTO: 159 THOUSANDS/UL (ref 149–390)
PMV BLD AUTO: 9.6 FL (ref 8.9–12.7)
POTASSIUM SERPL-SCNC: 3.7 MMOL/L (ref 3.5–5.3)
RBC # BLD AUTO: 3.94 MILLION/UL (ref 3.88–5.62)
SODIUM SERPL-SCNC: 140 MMOL/L (ref 136–145)
WBC # BLD AUTO: 4.55 THOUSAND/UL (ref 4.31–10.16)

## 2019-05-14 PROCEDURE — 80048 BASIC METABOLIC PNL TOTAL CA: CPT | Performed by: NURSE PRACTITIONER

## 2019-05-14 PROCEDURE — 85027 COMPLETE CBC AUTOMATED: CPT | Performed by: NURSE PRACTITIONER

## 2019-05-14 PROCEDURE — NC001 PR NO CHARGE: Performed by: INTERNAL MEDICINE

## 2019-05-14 RX ORDER — ASPIRIN 81 MG/1
81 TABLET ORAL DAILY
Qty: 30 TABLET | Refills: 0 | Status: SHIPPED | OUTPATIENT
Start: 2019-05-14 | End: 2019-06-23 | Stop reason: ALTCHOICE

## 2019-05-14 RX ADMIN — ASPIRIN 81 MG: 81 TABLET, COATED ORAL at 09:31

## 2019-05-14 RX ADMIN — FINASTERIDE 5 MG: 5 TABLET, FILM COATED ORAL at 09:31

## 2019-05-14 RX ADMIN — GABAPENTIN 800 MG: 400 CAPSULE ORAL at 09:31

## 2019-05-14 RX ADMIN — PAROXETINE 10 MG: 10 TABLET, FILM COATED ORAL at 09:32

## 2019-05-14 RX ADMIN — ALLOPURINOL 100 MG: 100 TABLET ORAL at 09:31

## 2019-05-14 RX ADMIN — FELODIPINE 5 MG: 2.5 TABLET, FILM COATED, EXTENDED RELEASE ORAL at 09:31

## 2019-05-14 RX ADMIN — METOPROLOL TARTRATE 25 MG: 50 TABLET, FILM COATED ORAL at 09:31

## 2019-05-14 RX ADMIN — RIVAROXABAN 15 MG: 15 TABLET, FILM COATED ORAL at 09:31

## 2019-05-14 RX ADMIN — FLUTICASONE FUROATE AND VILANTEROL TRIFENATATE 1 PUFF: 200; 25 POWDER RESPIRATORY (INHALATION) at 09:35

## 2019-05-14 RX ADMIN — CLOPIDOGREL BISULFATE 75 MG: 75 TABLET ORAL at 09:31

## 2019-05-16 DIAGNOSIS — I70.209 PERIPHERAL ARTERIOSCLEROSIS (HCC): ICD-10-CM

## 2019-05-16 DIAGNOSIS — M54.16 RADICULOPATHY OF LUMBAR REGION: ICD-10-CM

## 2019-05-16 RX ORDER — GABAPENTIN 800 MG/1
800 TABLET ORAL 3 TIMES DAILY
Qty: 270 TABLET | Refills: 0 | Status: SHIPPED | OUTPATIENT
Start: 2019-05-16 | End: 2019-06-23 | Stop reason: SDUPTHER

## 2019-05-17 ENCOUNTER — OFFICE VISIT (OUTPATIENT)
Dept: NEPHROLOGY | Facility: CLINIC | Age: 79
End: 2019-05-17
Payer: MEDICARE

## 2019-05-17 ENCOUNTER — TELEPHONE (OUTPATIENT)
Dept: CARDIOLOGY CLINIC | Facility: CLINIC | Age: 79
End: 2019-05-17

## 2019-05-17 VITALS
HEART RATE: 74 BPM | DIASTOLIC BLOOD PRESSURE: 64 MMHG | HEIGHT: 73 IN | SYSTOLIC BLOOD PRESSURE: 128 MMHG | WEIGHT: 315 LBS | BODY MASS INDEX: 41.75 KG/M2

## 2019-05-17 DIAGNOSIS — I12.9 BENIGN HYPERTENSION WITH CKD (CHRONIC KIDNEY DISEASE) STAGE III (HCC): ICD-10-CM

## 2019-05-17 DIAGNOSIS — E79.0 HYPERURICEMIA: ICD-10-CM

## 2019-05-17 DIAGNOSIS — N18.30 CKD (CHRONIC KIDNEY DISEASE) STAGE 3, GFR 30-59 ML/MIN (HCC): Primary | ICD-10-CM

## 2019-05-17 DIAGNOSIS — N18.30 BENIGN HYPERTENSION WITH CKD (CHRONIC KIDNEY DISEASE) STAGE III (HCC): ICD-10-CM

## 2019-05-17 DIAGNOSIS — R60.0 BILATERAL LEG EDEMA: ICD-10-CM

## 2019-05-17 DIAGNOSIS — R80.1 PERSISTENT PROTEINURIA: ICD-10-CM

## 2019-05-17 LAB
BUN SERPL-MCNC: 24 MG/DL (ref 8–27)
BUN/CREAT SERPL: 14 (ref 10–24)
CALCIUM SERPL-MCNC: 9.4 MG/DL (ref 8.6–10.2)
CHLORIDE SERPL-SCNC: 99 MMOL/L (ref 96–106)
CO2 SERPL-SCNC: 23 MMOL/L (ref 20–29)
CREAT SERPL-MCNC: 1.66 MG/DL (ref 0.76–1.27)
CREAT UR-MCNC: 261.6 MG/DL
GLUCOSE SERPL-MCNC: 159 MG/DL (ref 65–99)
MAGNESIUM SERPL-MCNC: 2.1 MG/DL (ref 1.6–2.3)
PHOSPHATE SERPL-MCNC: 3.6 MG/DL (ref 2.5–4.5)
POTASSIUM SERPL-SCNC: 4.2 MMOL/L (ref 3.5–5.2)
PROT UR-MCNC: 39 MG/DL
PROT/CREAT UR: 149 MG/G CREAT (ref 0–200)
SL AMB EGFR AFRICAN AMERICAN: 45 ML/MIN/1.73
SL AMB EGFR NON AFRICAN AMERICAN: 39 ML/MIN/1.73
SODIUM SERPL-SCNC: 141 MMOL/L (ref 134–144)
URATE SERPL-MCNC: 8.5 MG/DL (ref 3.7–8.6)

## 2019-05-17 PROCEDURE — 99214 OFFICE O/P EST MOD 30 MIN: CPT | Performed by: INTERNAL MEDICINE

## 2019-05-17 RX ORDER — ALLOPURINOL 100 MG/1
200 TABLET ORAL DAILY
Qty: 60 TABLET | Refills: 5 | Status: SHIPPED | OUTPATIENT
Start: 2019-05-17 | End: 2019-07-09 | Stop reason: SDUPTHER

## 2019-05-22 ENCOUNTER — OFFICE VISIT (OUTPATIENT)
Dept: PODIATRY | Facility: CLINIC | Age: 79
End: 2019-05-22
Payer: MEDICARE

## 2019-05-22 VITALS — BODY MASS INDEX: 41.75 KG/M2 | RESPIRATION RATE: 18 BRPM | HEIGHT: 73 IN | WEIGHT: 315 LBS

## 2019-05-22 DIAGNOSIS — B35.1 ONYCHOMYCOSIS: ICD-10-CM

## 2019-05-22 DIAGNOSIS — M21.969 ACQUIRED DEFORMITY OF ANKLE AND FOOT, UNSPECIFIED LATERALITY: Primary | ICD-10-CM

## 2019-05-22 DIAGNOSIS — M25.572 PAIN IN JOINTS OF BOTH FEET: ICD-10-CM

## 2019-05-22 DIAGNOSIS — M25.571 PAIN IN JOINTS OF BOTH FEET: ICD-10-CM

## 2019-05-22 DIAGNOSIS — M79.672 PAIN IN BOTH FEET: ICD-10-CM

## 2019-05-22 DIAGNOSIS — M79.671 PAIN IN BOTH FEET: ICD-10-CM

## 2019-05-22 DIAGNOSIS — B35.9 DERMATOPHYTOSIS: ICD-10-CM

## 2019-05-22 DIAGNOSIS — I70.209 ATHEROSCLEROSIS OF ARTERIES OF EXTREMITIES (HCC): ICD-10-CM

## 2019-05-22 PROCEDURE — 99213 OFFICE O/P EST LOW 20 MIN: CPT | Performed by: PODIATRIST

## 2019-05-23 ENCOUNTER — TELEPHONE (OUTPATIENT)
Dept: NEPHROLOGY | Facility: CLINIC | Age: 79
End: 2019-05-23

## 2019-05-23 LAB
APPEARANCE UR: CLEAR
BACTERIA URNS QL MICRO: ABNORMAL
BILIRUB UR QL STRIP: NEGATIVE
BUN SERPL-MCNC: 17 MG/DL (ref 8–27)
BUN/CREAT SERPL: 11 (ref 10–24)
CALCIUM SERPL-MCNC: 9.5 MG/DL (ref 8.6–10.2)
CASTS URNS MICRO: ABNORMAL
CASTS URNS QL MICRO: PRESENT /LPF
CHLORIDE SERPL-SCNC: 99 MMOL/L (ref 96–106)
CO2 SERPL-SCNC: 26 MMOL/L (ref 20–29)
COLOR UR: YELLOW
CREAT SERPL-MCNC: 1.58 MG/DL (ref 0.76–1.27)
CREAT UR-MCNC: 108.7 MG/DL
CRYSTALS URNS MICRO: ABNORMAL
EPI CELLS #/AREA URNS HPF: ABNORMAL /HPF (ref 0–10)
ERYTHROCYTE [DISTWIDTH] IN BLOOD BY AUTOMATED COUNT: 15.7 % (ref 12.3–15.4)
GLUCOSE SERPL-MCNC: 147 MG/DL (ref 65–99)
GLUCOSE UR QL: NEGATIVE
HCT VFR BLD AUTO: 38.3 % (ref 37.5–51)
HGB BLD-MCNC: 12.7 G/DL (ref 13–17.7)
HGB UR QL STRIP: NEGATIVE
KETONES UR QL STRIP: NEGATIVE
LEUKOCYTE ESTERASE UR QL STRIP: ABNORMAL
MAGNESIUM SERPL-MCNC: 1.9 MG/DL (ref 1.6–2.3)
MCH RBC QN AUTO: 31.2 PG (ref 26.6–33)
MCHC RBC AUTO-ENTMCNC: 33.2 G/DL (ref 31.5–35.7)
MCV RBC AUTO: 94 FL (ref 79–97)
MICRO URNS: ABNORMAL
MUCOUS THREADS URNS QL MICRO: PRESENT
NITRITE UR QL STRIP: NEGATIVE
PH UR STRIP: 6 [PH] (ref 5–7.5)
PHOSPHATE SERPL-MCNC: 3.6 MG/DL (ref 2.5–4.5)
PLATELET # BLD AUTO: 191 X10E3/UL (ref 150–450)
POTASSIUM SERPL-SCNC: 4.1 MMOL/L (ref 3.5–5.2)
PROT UR QL STRIP: ABNORMAL
PROT UR-MCNC: 29.8 MG/DL
PROT/CREAT UR: 274 MG/G CREAT (ref 0–200)
PTH-INTACT SERPL-MCNC: 86 PG/ML (ref 15–65)
RBC # BLD AUTO: 4.07 X10E6/UL (ref 4.14–5.8)
RBC #/AREA URNS HPF: ABNORMAL /HPF (ref 0–2)
SL AMB EGFR AFRICAN AMERICAN: 48 ML/MIN/1.73
SL AMB EGFR NON AFRICAN AMERICAN: 41 ML/MIN/1.73
SODIUM SERPL-SCNC: 143 MMOL/L (ref 134–144)
SP GR UR: 1.02 (ref 1–1.03)
UNIDENT CRYS URNS QL MICRO: PRESENT
URATE SERPL-MCNC: 8.1 MG/DL (ref 3.7–8.6)
UROBILINOGEN UR STRIP-ACNC: 0.2 EU/DL (ref 0.2–1)
WBC # BLD AUTO: 7.2 X10E3/UL (ref 3.4–10.8)
WBC #/AREA URNS HPF: ABNORMAL /HPF (ref 0–5)

## 2019-05-29 ENCOUNTER — TELEPHONE (OUTPATIENT)
Dept: FAMILY MEDICINE CLINIC | Facility: CLINIC | Age: 79
End: 2019-05-29

## 2019-05-29 DIAGNOSIS — M54.16 LUMBAR RADICULOPATHY: Primary | ICD-10-CM

## 2019-05-29 DIAGNOSIS — I70.209 PERIPHERAL ARTERIOSCLEROSIS (HCC): ICD-10-CM

## 2019-05-29 DIAGNOSIS — M54.16 RADICULOPATHY OF LUMBAR REGION: ICD-10-CM

## 2019-05-29 RX ORDER — GABAPENTIN 800 MG/1
TABLET ORAL
Qty: 270 TABLET | Refills: 0 | Status: SHIPPED | OUTPATIENT
Start: 2019-05-29 | End: 2019-07-23 | Stop reason: SDUPTHER

## 2019-06-03 ENCOUNTER — TELEPHONE (OUTPATIENT)
Dept: NEPHROLOGY | Facility: CLINIC | Age: 79
End: 2019-06-03

## 2019-06-03 ENCOUNTER — EVALUATION (OUTPATIENT)
Dept: PHYSICAL THERAPY | Facility: CLINIC | Age: 79
End: 2019-06-03
Payer: MEDICARE

## 2019-06-03 DIAGNOSIS — G89.29 CHRONIC BILATERAL LOW BACK PAIN WITHOUT SCIATICA: Primary | ICD-10-CM

## 2019-06-03 DIAGNOSIS — M54.50 CHRONIC BILATERAL LOW BACK PAIN WITHOUT SCIATICA: Primary | ICD-10-CM

## 2019-06-03 PROCEDURE — 97162 PT EVAL MOD COMPLEX 30 MIN: CPT | Performed by: PHYSICAL THERAPIST

## 2019-06-03 PROCEDURE — 97530 THERAPEUTIC ACTIVITIES: CPT | Performed by: PHYSICAL THERAPIST

## 2019-06-05 ENCOUNTER — OFFICE VISIT (OUTPATIENT)
Dept: CARDIOLOGY CLINIC | Facility: CLINIC | Age: 79
End: 2019-06-05
Payer: MEDICARE

## 2019-06-05 VITALS
BODY MASS INDEX: 41.75 KG/M2 | HEIGHT: 73 IN | OXYGEN SATURATION: 95 % | HEART RATE: 70 BPM | DIASTOLIC BLOOD PRESSURE: 98 MMHG | WEIGHT: 315 LBS | SYSTOLIC BLOOD PRESSURE: 132 MMHG

## 2019-06-05 DIAGNOSIS — Z95.0 PACEMAKER: ICD-10-CM

## 2019-06-05 DIAGNOSIS — I10 ESSENTIAL HYPERTENSION: ICD-10-CM

## 2019-06-05 DIAGNOSIS — Z95.5 STATUS POST PRIMARY ANGIOPLASTY WITH CORONARY STENT: Chronic | ICD-10-CM

## 2019-06-05 DIAGNOSIS — E78.2 MIXED HYPERLIPIDEMIA: ICD-10-CM

## 2019-06-05 DIAGNOSIS — I12.9 BENIGN HYPERTENSION WITH CHRONIC KIDNEY DISEASE, STAGE III (HCC): ICD-10-CM

## 2019-06-05 DIAGNOSIS — I50.32 CHRONIC DIASTOLIC CONGESTIVE HEART FAILURE (HCC): ICD-10-CM

## 2019-06-05 DIAGNOSIS — I71.4 ABDOMINAL AORTIC ANEURYSM (AAA) WITHOUT RUPTURE (HCC): ICD-10-CM

## 2019-06-05 DIAGNOSIS — I48.20 CHRONIC ATRIAL FIBRILLATION (HCC): ICD-10-CM

## 2019-06-05 DIAGNOSIS — N18.30 BENIGN HYPERTENSION WITH CHRONIC KIDNEY DISEASE, STAGE III (HCC): ICD-10-CM

## 2019-06-05 DIAGNOSIS — E66.01 MORBID OBESITY (HCC): ICD-10-CM

## 2019-06-05 DIAGNOSIS — N18.30 CKD (CHRONIC KIDNEY DISEASE) STAGE 3, GFR 30-59 ML/MIN (HCC): ICD-10-CM

## 2019-06-05 DIAGNOSIS — I48.20 CHRONIC A-FIB (HCC): ICD-10-CM

## 2019-06-05 DIAGNOSIS — I25.10 CORONARY ARTERY DISEASE INVOLVING NATIVE CORONARY ARTERY OF NATIVE HEART WITHOUT ANGINA PECTORIS: Chronic | ICD-10-CM

## 2019-06-05 PROCEDURE — 99214 OFFICE O/P EST MOD 30 MIN: CPT | Performed by: INTERNAL MEDICINE

## 2019-06-05 PROCEDURE — 93000 ELECTROCARDIOGRAM COMPLETE: CPT | Performed by: INTERNAL MEDICINE

## 2019-06-06 ENCOUNTER — CLINICAL SUPPORT (OUTPATIENT)
Dept: CARDIAC REHAB | Facility: CLINIC | Age: 79
End: 2019-06-06
Payer: MEDICARE

## 2019-06-06 VITALS — HEIGHT: 73 IN | BODY MASS INDEX: 41.75 KG/M2 | WEIGHT: 315 LBS

## 2019-06-06 DIAGNOSIS — Z95.5 STATUS POST PRIMARY ANGIOPLASTY WITH CORONARY STENT: Chronic | ICD-10-CM

## 2019-06-06 PROCEDURE — 93797 PHYS/QHP OP CAR RHAB WO ECG: CPT

## 2019-06-07 DIAGNOSIS — J44.1 COPD EXACERBATION (HCC): ICD-10-CM

## 2019-06-07 RX ORDER — IPRATROPIUM BROMIDE AND ALBUTEROL SULFATE 2.5; .5 MG/3ML; MG/3ML
3 SOLUTION RESPIRATORY (INHALATION)
Qty: 60 VIAL | Refills: 10 | Status: SHIPPED | OUTPATIENT
Start: 2019-06-07 | End: 2019-06-07 | Stop reason: SDUPTHER

## 2019-06-07 RX ORDER — IPRATROPIUM BROMIDE AND ALBUTEROL SULFATE 2.5; .5 MG/3ML; MG/3ML
3 SOLUTION RESPIRATORY (INHALATION)
Qty: 60 VIAL | Refills: 10 | Status: SHIPPED | OUTPATIENT
Start: 2019-06-07 | End: 2019-09-19 | Stop reason: SDUPTHER

## 2019-06-10 ENCOUNTER — HOSPITAL ENCOUNTER (EMERGENCY)
Facility: HOSPITAL | Age: 79
Discharge: HOME | End: 2019-06-10
Attending: EMERGENCY MEDICINE | Admitting: EMERGENCY MEDICINE
Payer: MEDICARE

## 2019-06-10 ENCOUNTER — APPOINTMENT (OUTPATIENT)
Dept: PHYSICAL THERAPY | Facility: CLINIC | Age: 79
End: 2019-06-10
Payer: MEDICARE

## 2019-06-10 ENCOUNTER — CLINICAL SUPPORT (OUTPATIENT)
Dept: CARDIAC REHAB | Facility: CLINIC | Age: 79
End: 2019-06-10
Payer: MEDICARE

## 2019-06-10 ENCOUNTER — OFFICE VISIT (OUTPATIENT)
Dept: PHYSICAL THERAPY | Facility: CLINIC | Age: 79
End: 2019-06-10
Payer: MEDICARE

## 2019-06-10 VITALS
TEMPERATURE: 98.7 F | OXYGEN SATURATION: 93 % | SYSTOLIC BLOOD PRESSURE: 143 MMHG | RESPIRATION RATE: 20 BRPM | BODY MASS INDEX: 41.75 KG/M2 | WEIGHT: 315 LBS | HEIGHT: 73 IN | HEART RATE: 64 BPM | DIASTOLIC BLOOD PRESSURE: 66 MMHG

## 2019-06-10 DIAGNOSIS — G89.29 CHRONIC BILATERAL LOW BACK PAIN WITHOUT SCIATICA: Primary | ICD-10-CM

## 2019-06-10 DIAGNOSIS — R58 BLEEDING: ICD-10-CM

## 2019-06-10 DIAGNOSIS — T14.8XXA ABRASION: Primary | ICD-10-CM

## 2019-06-10 DIAGNOSIS — M54.50 CHRONIC BILATERAL LOW BACK PAIN WITHOUT SCIATICA: Primary | ICD-10-CM

## 2019-06-10 DIAGNOSIS — Z95.5 STATUS POST PRIMARY ANGIOPLASTY WITH CORONARY STENT: Chronic | ICD-10-CM

## 2019-06-10 PROCEDURE — 93798 PHYS/QHP OP CAR RHAB W/ECG: CPT

## 2019-06-10 PROCEDURE — 97110 THERAPEUTIC EXERCISES: CPT | Performed by: PHYSICAL THERAPIST

## 2019-06-10 PROCEDURE — 99283 EMERGENCY DEPT VISIT LOW MDM: CPT

## 2019-06-11 ENCOUNTER — VBI (OUTPATIENT)
Dept: FAMILY MEDICINE CLINIC | Facility: CLINIC | Age: 79
End: 2019-06-11

## 2019-06-12 ENCOUNTER — CLINICAL SUPPORT (OUTPATIENT)
Dept: CARDIAC REHAB | Facility: CLINIC | Age: 79
End: 2019-06-12
Payer: MEDICARE

## 2019-06-12 ENCOUNTER — OFFICE VISIT (OUTPATIENT)
Dept: PHYSICAL THERAPY | Facility: CLINIC | Age: 79
End: 2019-06-12
Payer: MEDICARE

## 2019-06-12 DIAGNOSIS — M54.50 CHRONIC BILATERAL LOW BACK PAIN WITHOUT SCIATICA: Primary | ICD-10-CM

## 2019-06-12 DIAGNOSIS — Z95.5 STATUS POST PRIMARY ANGIOPLASTY WITH CORONARY STENT: Chronic | ICD-10-CM

## 2019-06-12 DIAGNOSIS — G89.29 CHRONIC BILATERAL LOW BACK PAIN WITHOUT SCIATICA: Primary | ICD-10-CM

## 2019-06-12 PROCEDURE — 97110 THERAPEUTIC EXERCISES: CPT | Performed by: PHYSICAL THERAPIST

## 2019-06-12 PROCEDURE — 93798 PHYS/QHP OP CAR RHAB W/ECG: CPT

## 2019-06-14 ENCOUNTER — CLINICAL SUPPORT (OUTPATIENT)
Dept: CARDIAC REHAB | Facility: CLINIC | Age: 79
End: 2019-06-14
Payer: MEDICARE

## 2019-06-14 DIAGNOSIS — Z95.5 S/P PRIMARY ANGIOPLASTY WITH CORONARY STENT: ICD-10-CM

## 2019-06-14 PROCEDURE — 93798 PHYS/QHP OP CAR RHAB W/ECG: CPT

## 2019-06-17 ENCOUNTER — CLINICAL SUPPORT (OUTPATIENT)
Dept: CARDIAC REHAB | Facility: CLINIC | Age: 79
End: 2019-06-17
Payer: MEDICARE

## 2019-06-17 ENCOUNTER — OFFICE VISIT (OUTPATIENT)
Dept: PHYSICAL THERAPY | Facility: CLINIC | Age: 79
End: 2019-06-17
Payer: MEDICARE

## 2019-06-17 DIAGNOSIS — G89.29 CHRONIC BILATERAL LOW BACK PAIN WITHOUT SCIATICA: Primary | ICD-10-CM

## 2019-06-17 DIAGNOSIS — M54.50 CHRONIC BILATERAL LOW BACK PAIN WITHOUT SCIATICA: Primary | ICD-10-CM

## 2019-06-17 DIAGNOSIS — Z95.5 STATUS POST PRIMARY ANGIOPLASTY WITH CORONARY STENT: Chronic | ICD-10-CM

## 2019-06-17 PROCEDURE — 97110 THERAPEUTIC EXERCISES: CPT | Performed by: PHYSICAL THERAPIST

## 2019-06-17 PROCEDURE — 93798 PHYS/QHP OP CAR RHAB W/ECG: CPT

## 2019-06-19 ENCOUNTER — CLINICAL SUPPORT (OUTPATIENT)
Dept: CARDIAC REHAB | Facility: CLINIC | Age: 79
End: 2019-06-19
Payer: MEDICARE

## 2019-06-19 DIAGNOSIS — Z95.5 STATUS POST PRIMARY ANGIOPLASTY WITH CORONARY STENT: Chronic | ICD-10-CM

## 2019-06-19 PROCEDURE — 93798 PHYS/QHP OP CAR RHAB W/ECG: CPT

## 2019-06-20 ENCOUNTER — OFFICE VISIT (OUTPATIENT)
Dept: PHYSICAL THERAPY | Facility: CLINIC | Age: 79
End: 2019-06-20
Payer: MEDICARE

## 2019-06-20 DIAGNOSIS — G89.29 CHRONIC BILATERAL LOW BACK PAIN WITHOUT SCIATICA: Primary | ICD-10-CM

## 2019-06-20 DIAGNOSIS — M54.50 CHRONIC BILATERAL LOW BACK PAIN WITHOUT SCIATICA: Primary | ICD-10-CM

## 2019-06-20 PROCEDURE — 97110 THERAPEUTIC EXERCISES: CPT | Performed by: PHYSICAL THERAPIST

## 2019-06-21 ENCOUNTER — CLINICAL SUPPORT (OUTPATIENT)
Dept: CARDIAC REHAB | Facility: CLINIC | Age: 79
End: 2019-06-21
Payer: MEDICARE

## 2019-06-21 DIAGNOSIS — Z95.5 STATUS POST PRIMARY ANGIOPLASTY WITH CORONARY STENT: Chronic | ICD-10-CM

## 2019-06-21 PROCEDURE — 93798 PHYS/QHP OP CAR RHAB W/ECG: CPT

## 2019-06-23 ENCOUNTER — APPOINTMENT (EMERGENCY)
Dept: RADIOLOGY | Facility: HOSPITAL | Age: 79
DRG: 871 | End: 2019-06-23
Attending: EMERGENCY MEDICINE
Payer: MEDICARE

## 2019-06-23 ENCOUNTER — HOSPITAL ENCOUNTER (INPATIENT)
Facility: HOSPITAL | Age: 79
LOS: 3 days | Discharge: HOME | DRG: 871 | End: 2019-06-26
Attending: EMERGENCY MEDICINE | Admitting: STUDENT IN AN ORGANIZED HEALTH CARE EDUCATION/TRAINING PROGRAM
Payer: MEDICARE

## 2019-06-23 DIAGNOSIS — R06.00 DYSPNEA: ICD-10-CM

## 2019-06-23 DIAGNOSIS — J18.9 PNEUMONIA: Primary | ICD-10-CM

## 2019-06-23 DIAGNOSIS — R50.9 FEVER: ICD-10-CM

## 2019-06-23 PROBLEM — A41.9 SEPSIS (HCC): Status: ACTIVE | Noted: 2019-06-23

## 2019-06-23 PROBLEM — J43.2 CENTRILOBULAR EMPHYSEMA (HCC): Status: ACTIVE | Noted: 2019-06-23

## 2019-06-23 LAB
ALBUMIN SERPL BCP-MCNC: 3.1 G/DL (ref 3.5–5)
ALP SERPL-CCNC: 53 U/L (ref 46–116)
ALT SERPL W P-5'-P-CCNC: 21 U/L (ref 12–78)
ANION GAP SERPL CALCULATED.3IONS-SCNC: 6 MMOL/L (ref 4–13)
APTT PPP: 30 SECONDS (ref 24–33)
AST SERPL W P-5'-P-CCNC: 19 U/L (ref 5–45)
BACTERIA UR QL AUTO: ABNORMAL /HPF
BASOPHILS # BLD AUTO: 0.04 THOUSANDS/ΜL (ref 0–0.1)
BASOPHILS NFR BLD AUTO: 0 % (ref 0–1)
BILIRUB SERPL-MCNC: 1.1 MG/DL (ref 0.2–1)
BILIRUB UR QL STRIP: NEGATIVE
BUN SERPL-MCNC: 23 MG/DL (ref 5–25)
CALCIUM SERPL-MCNC: 9.2 MG/DL (ref 8.3–10.1)
CHLORIDE SERPL-SCNC: 103 MMOL/L (ref 100–108)
CLARITY UR: CLEAR
CO2 SERPL-SCNC: 30 MMOL/L (ref 21–32)
COLOR UR: YELLOW
CREAT SERPL-MCNC: 1.44 MG/DL (ref 0.6–1.3)
EOSINOPHIL # BLD AUTO: 0.12 THOUSAND/ΜL (ref 0–0.61)
EOSINOPHIL NFR BLD AUTO: 1 % (ref 0–6)
ERYTHROCYTE [DISTWIDTH] IN BLOOD BY AUTOMATED COUNT: 14.4 % (ref 11.6–15.1)
GFR SERPL CREATININE-BSD FRML MDRD: 46 ML/MIN/1.73SQ M
GLUCOSE SERPL-MCNC: 166 MG/DL (ref 65–140)
GLUCOSE SERPL-MCNC: 182 MG/DL (ref 65–140)
GLUCOSE UR STRIP-MCNC: NEGATIVE MG/DL
HCT VFR BLD AUTO: 37.6 % (ref 36.5–49.3)
HGB BLD-MCNC: 11.8 G/DL (ref 12–17)
HGB UR QL STRIP.AUTO: NEGATIVE
IMM GRANULOCYTES # BLD AUTO: 0.06 THOUSAND/UL (ref 0–0.2)
IMM GRANULOCYTES NFR BLD AUTO: 1 % (ref 0–2)
INR PPP: 1.14 (ref 0.86–1.16)
KETONES UR STRIP-MCNC: NEGATIVE MG/DL
L PNEUMO1 AG UR QL IA.RAPID: NEGATIVE
LACTATE SERPL-SCNC: 1.6 MMOL/L (ref 0.5–2)
LEUKOCYTE ESTERASE UR QL STRIP: ABNORMAL
LYMPHOCYTES # BLD AUTO: 1.06 THOUSANDS/ΜL (ref 0.6–4.47)
LYMPHOCYTES NFR BLD AUTO: 8 % (ref 14–44)
MCH RBC QN AUTO: 31 PG (ref 26.8–34.3)
MCHC RBC AUTO-ENTMCNC: 31.4 G/DL (ref 31.4–37.4)
MCV RBC AUTO: 99 FL (ref 82–98)
MONOCYTES # BLD AUTO: 0.63 THOUSAND/ΜL (ref 0.17–1.22)
MONOCYTES NFR BLD AUTO: 5 % (ref 4–12)
NEUTROPHILS # BLD AUTO: 10.86 THOUSANDS/ΜL (ref 1.85–7.62)
NEUTS SEG NFR BLD AUTO: 85 % (ref 43–75)
NITRITE UR QL STRIP: NEGATIVE
NON-SQ EPI CELLS URNS QL MICRO: ABNORMAL /HPF
NRBC BLD AUTO-RTO: 0 /100 WBCS
NT-PROBNP SERPL-MCNC: 1485 PG/ML
PH UR STRIP.AUTO: 5.5 [PH]
PLATELET # BLD AUTO: 180 THOUSANDS/UL (ref 149–390)
PMV BLD AUTO: 9.4 FL (ref 8.9–12.7)
POTASSIUM SERPL-SCNC: 4.1 MMOL/L (ref 3.5–5.3)
PROCALCITONIN SERPL-MCNC: 1.61 NG/ML
PROT SERPL-MCNC: 7.5 G/DL (ref 6.4–8.2)
PROT UR STRIP-MCNC: NEGATIVE MG/DL
PROTHROMBIN TIME: 11.9 SECONDS (ref 9.4–11.7)
RBC # BLD AUTO: 3.81 MILLION/UL (ref 3.88–5.62)
RBC #/AREA URNS AUTO: ABNORMAL /HPF
S PNEUM AG UR QL: NEGATIVE
SODIUM SERPL-SCNC: 139 MMOL/L (ref 136–145)
SP GR UR STRIP.AUTO: 1.02 (ref 1–1.03)
TROPONIN I SERPL-MCNC: <0.02 NG/ML
TROPONIN I SERPL-MCNC: <0.02 NG/ML
UROBILINOGEN UR QL STRIP.AUTO: 0.2 E.U./DL
WBC # BLD AUTO: 12.77 THOUSAND/UL (ref 4.31–10.16)
WBC #/AREA URNS AUTO: ABNORMAL /HPF
WBC CLUMPS # UR AUTO: PRESENT /UL

## 2019-06-23 PROCEDURE — 87147 CULTURE TYPE IMMUNOLOGIC: CPT | Performed by: INTERNAL MEDICINE

## 2019-06-23 PROCEDURE — 94640 AIRWAY INHALATION TREATMENT: CPT

## 2019-06-23 PROCEDURE — 87070 CULTURE OTHR SPECIMN AEROBIC: CPT | Performed by: INTERNAL MEDICINE

## 2019-06-23 PROCEDURE — 87081 CULTURE SCREEN ONLY: CPT | Performed by: INTERNAL MEDICINE

## 2019-06-23 PROCEDURE — 87205 SMEAR GRAM STAIN: CPT | Performed by: INTERNAL MEDICINE

## 2019-06-23 PROCEDURE — 99285 EMERGENCY DEPT VISIT HI MDM: CPT

## 2019-06-23 PROCEDURE — 85610 PROTHROMBIN TIME: CPT | Performed by: EMERGENCY MEDICINE

## 2019-06-23 PROCEDURE — 36415 COLL VENOUS BLD VENIPUNCTURE: CPT | Performed by: EMERGENCY MEDICINE

## 2019-06-23 PROCEDURE — 71045 X-RAY EXAM CHEST 1 VIEW: CPT

## 2019-06-23 PROCEDURE — 85730 THROMBOPLASTIN TIME PARTIAL: CPT | Performed by: EMERGENCY MEDICINE

## 2019-06-23 PROCEDURE — 81001 URINALYSIS AUTO W/SCOPE: CPT | Performed by: INTERNAL MEDICINE

## 2019-06-23 PROCEDURE — 99223 1ST HOSP IP/OBS HIGH 75: CPT | Performed by: INTERNAL MEDICINE

## 2019-06-23 PROCEDURE — 85025 COMPLETE CBC W/AUTO DIFF WBC: CPT | Performed by: EMERGENCY MEDICINE

## 2019-06-23 PROCEDURE — 87040 BLOOD CULTURE FOR BACTERIA: CPT | Performed by: EMERGENCY MEDICINE

## 2019-06-23 PROCEDURE — 84484 ASSAY OF TROPONIN QUANT: CPT | Performed by: INTERNAL MEDICINE

## 2019-06-23 PROCEDURE — 87186 SC STD MICRODIL/AGAR DIL: CPT | Performed by: INTERNAL MEDICINE

## 2019-06-23 PROCEDURE — 87147 CULTURE TYPE IMMUNOLOGIC: CPT | Performed by: EMERGENCY MEDICINE

## 2019-06-23 PROCEDURE — 83605 ASSAY OF LACTIC ACID: CPT | Performed by: EMERGENCY MEDICINE

## 2019-06-23 PROCEDURE — 87449 NOS EACH ORGANISM AG IA: CPT | Performed by: INTERNAL MEDICINE

## 2019-06-23 PROCEDURE — 83880 ASSAY OF NATRIURETIC PEPTIDE: CPT | Performed by: EMERGENCY MEDICINE

## 2019-06-23 PROCEDURE — 84145 PROCALCITONIN (PCT): CPT | Performed by: INTERNAL MEDICINE

## 2019-06-23 PROCEDURE — 96365 THER/PROPH/DIAG IV INF INIT: CPT

## 2019-06-23 PROCEDURE — 80053 COMPREHEN METABOLIC PANEL: CPT | Performed by: EMERGENCY MEDICINE

## 2019-06-23 PROCEDURE — 94760 N-INVAS EAR/PLS OXIMETRY 1: CPT

## 2019-06-23 PROCEDURE — 93005 ELECTROCARDIOGRAM TRACING: CPT

## 2019-06-23 PROCEDURE — 82948 REAGENT STRIP/BLOOD GLUCOSE: CPT

## 2019-06-23 RX ORDER — FINASTERIDE 5 MG/1
5 TABLET, FILM COATED ORAL DAILY
Status: DISCONTINUED | OUTPATIENT
Start: 2019-06-23 | End: 2019-06-26 | Stop reason: HOSPADM

## 2019-06-23 RX ORDER — FUROSEMIDE 40 MG/1
40 TABLET ORAL 2 TIMES DAILY
Status: DISCONTINUED | OUTPATIENT
Start: 2019-06-23 | End: 2019-06-26 | Stop reason: HOSPADM

## 2019-06-23 RX ORDER — ACETAMINOPHEN 325 MG/1
650 TABLET ORAL EVERY 6 HOURS PRN
Status: DISCONTINUED | OUTPATIENT
Start: 2019-06-23 | End: 2019-06-26 | Stop reason: HOSPADM

## 2019-06-23 RX ORDER — ASCORBIC ACID 500 MG
500 TABLET ORAL DAILY
Status: DISCONTINUED | OUTPATIENT
Start: 2019-06-23 | End: 2019-06-26 | Stop reason: HOSPADM

## 2019-06-23 RX ORDER — CEFTRIAXONE 1 G/50ML
1000 INJECTION, SOLUTION INTRAVENOUS ONCE
Status: COMPLETED | OUTPATIENT
Start: 2019-06-23 | End: 2019-06-23

## 2019-06-23 RX ORDER — ALLOPURINOL 100 MG/1
200 TABLET ORAL DAILY
Status: DISCONTINUED | OUTPATIENT
Start: 2019-06-23 | End: 2019-06-26 | Stop reason: HOSPADM

## 2019-06-23 RX ORDER — IPRATROPIUM BROMIDE AND ALBUTEROL SULFATE 2.5; .5 MG/3ML; MG/3ML
3 SOLUTION RESPIRATORY (INHALATION)
Status: DISCONTINUED | OUTPATIENT
Start: 2019-06-23 | End: 2019-06-26 | Stop reason: HOSPADM

## 2019-06-23 RX ORDER — LANOLIN ALCOHOL/MO/W.PET/CERES
1 CREAM (GRAM) TOPICAL 2 TIMES DAILY
Status: DISCONTINUED | OUTPATIENT
Start: 2019-06-23 | End: 2019-06-24

## 2019-06-23 RX ORDER — FLUTICASONE FUROATE AND VILANTEROL 100; 25 UG/1; UG/1
1 POWDER RESPIRATORY (INHALATION) DAILY
Status: DISCONTINUED | OUTPATIENT
Start: 2019-06-23 | End: 2019-06-26 | Stop reason: HOSPADM

## 2019-06-23 RX ORDER — TRAMADOL HYDROCHLORIDE 50 MG/1
50 TABLET ORAL EVERY 6 HOURS PRN
Status: DISCONTINUED | OUTPATIENT
Start: 2019-06-23 | End: 2019-06-26 | Stop reason: HOSPADM

## 2019-06-23 RX ORDER — UBIDECARENONE 75 MG
100 CAPSULE ORAL DAILY
Status: DISCONTINUED | OUTPATIENT
Start: 2019-06-23 | End: 2019-06-26 | Stop reason: HOSPADM

## 2019-06-23 RX ORDER — IPRATROPIUM BROMIDE AND ALBUTEROL SULFATE 2.5; .5 MG/3ML; MG/3ML
3 SOLUTION RESPIRATORY (INHALATION) EVERY 4 HOURS PRN
Status: DISCONTINUED | OUTPATIENT
Start: 2019-06-23 | End: 2019-06-26 | Stop reason: HOSPADM

## 2019-06-23 RX ORDER — CHOLECALCIFEROL (VITAMIN D3) 125 MCG
3000 CAPSULE ORAL 3 TIMES DAILY PRN
Status: DISCONTINUED | OUTPATIENT
Start: 2019-06-23 | End: 2019-06-26 | Stop reason: HOSPADM

## 2019-06-23 RX ORDER — PAROXETINE HYDROCHLORIDE 20 MG/1
10 TABLET, FILM COATED ORAL DAILY
Status: DISCONTINUED | OUTPATIENT
Start: 2019-06-23 | End: 2019-06-26 | Stop reason: HOSPADM

## 2019-06-23 RX ORDER — GABAPENTIN 400 MG/1
800 CAPSULE ORAL 3 TIMES DAILY
Status: DISCONTINUED | OUTPATIENT
Start: 2019-06-23 | End: 2019-06-26 | Stop reason: HOSPADM

## 2019-06-23 RX ORDER — GUAIFENESIN/DEXTROMETHORPHAN 100-10MG/5
10 SYRUP ORAL EVERY 4 HOURS PRN
Status: DISCONTINUED | OUTPATIENT
Start: 2019-06-23 | End: 2019-06-26 | Stop reason: HOSPADM

## 2019-06-23 RX ORDER — IPRATROPIUM BROMIDE AND ALBUTEROL SULFATE 2.5; .5 MG/3ML; MG/3ML
SOLUTION RESPIRATORY (INHALATION)
Status: DISPENSED
Start: 2019-06-23 | End: 2019-06-23

## 2019-06-23 RX ORDER — B-COMPLEX WITH VITAMIN C
1 TABLET ORAL 2 TIMES DAILY WITH MEALS
Status: DISCONTINUED | OUTPATIENT
Start: 2019-06-23 | End: 2019-06-26 | Stop reason: HOSPADM

## 2019-06-23 RX ORDER — ACETAMINOPHEN 325 MG/1
650 TABLET ORAL ONCE
Status: COMPLETED | OUTPATIENT
Start: 2019-06-23 | End: 2019-06-23

## 2019-06-23 RX ORDER — GUAIFENESIN 600 MG
600 TABLET, EXTENDED RELEASE 12 HR ORAL EVERY 12 HOURS SCHEDULED
Status: DISCONTINUED | OUTPATIENT
Start: 2019-06-23 | End: 2019-06-26 | Stop reason: HOSPADM

## 2019-06-23 RX ORDER — ATORVASTATIN CALCIUM 10 MG/1
10 TABLET, FILM COATED ORAL
Status: DISCONTINUED | OUTPATIENT
Start: 2019-06-23 | End: 2019-06-26 | Stop reason: HOSPADM

## 2019-06-23 RX ORDER — CLOPIDOGREL BISULFATE 75 MG/1
75 TABLET ORAL DAILY
Status: DISCONTINUED | OUTPATIENT
Start: 2019-06-23 | End: 2019-06-26 | Stop reason: HOSPADM

## 2019-06-23 RX ORDER — FELODIPINE 2.5 MG/1
5 TABLET, EXTENDED RELEASE ORAL DAILY
Status: DISCONTINUED | OUTPATIENT
Start: 2019-06-23 | End: 2019-06-26 | Stop reason: HOSPADM

## 2019-06-23 RX ORDER — CHLORAL HYDRATE 500 MG
1000 CAPSULE ORAL 2 TIMES DAILY
Status: DISCONTINUED | OUTPATIENT
Start: 2019-06-23 | End: 2019-06-26 | Stop reason: HOSPADM

## 2019-06-23 RX ORDER — ONDANSETRON 2 MG/ML
4 INJECTION INTRAMUSCULAR; INTRAVENOUS EVERY 6 HOURS PRN
Status: DISCONTINUED | OUTPATIENT
Start: 2019-06-23 | End: 2019-06-26 | Stop reason: HOSPADM

## 2019-06-23 RX ADMIN — METOPROLOL TARTRATE 25 MG: 25 TABLET, FILM COATED ORAL at 21:51

## 2019-06-23 RX ADMIN — GABAPENTIN 800 MG: 400 CAPSULE ORAL at 21:51

## 2019-06-23 RX ADMIN — PAROXETINE 10 MG: 20 TABLET, FILM COATED ORAL at 12:10

## 2019-06-23 RX ADMIN — METOPROLOL TARTRATE 25 MG: 25 TABLET, FILM COATED ORAL at 12:09

## 2019-06-23 RX ADMIN — GABAPENTIN 800 MG: 400 CAPSULE ORAL at 12:09

## 2019-06-23 RX ADMIN — CALCIUM CARBONATE 500 MG (1,250 MG)-VITAMIN D3 200 UNIT TABLET 1 TABLET: at 15:44

## 2019-06-23 RX ADMIN — IPRATROPIUM BROMIDE AND ALBUTEROL SULFATE 3 ML: 2.5; .5 SOLUTION RESPIRATORY (INHALATION) at 13:17

## 2019-06-23 RX ADMIN — ALLOPURINOL 200 MG: 100 TABLET ORAL at 12:07

## 2019-06-23 RX ADMIN — VANCOMYCIN HYDROCHLORIDE 1500 MG: 10 INJECTION, POWDER, LYOPHILIZED, FOR SOLUTION INTRAVENOUS at 21:52

## 2019-06-23 RX ADMIN — FLUTICASONE FUROATE AND VILANTEROL TRIFENATATE 1 PUFF: 100; 25 POWDER RESPIRATORY (INHALATION) at 12:04

## 2019-06-23 RX ADMIN — FELODIPINE 5 MG: 2.5 TABLET, FILM COATED, EXTENDED RELEASE ORAL at 12:06

## 2019-06-23 RX ADMIN — FINASTERIDE 5 MG: 5 TABLET, FILM COATED ORAL at 12:10

## 2019-06-23 RX ADMIN — RIVAROXABAN 20 MG: 10 TABLET, FILM COATED ORAL at 19:36

## 2019-06-23 RX ADMIN — IPRATROPIUM BROMIDE AND ALBUTEROL SULFATE 3 ML: 2.5; .5 SOLUTION RESPIRATORY (INHALATION) at 05:04

## 2019-06-23 RX ADMIN — GUAIFENESIN 600 MG: 600 TABLET, EXTENDED RELEASE ORAL at 12:08

## 2019-06-23 RX ADMIN — CEFTRIAXONE 1000 MG: 1 INJECTION, SOLUTION INTRAVENOUS at 05:47

## 2019-06-23 RX ADMIN — FUROSEMIDE 40 MG: 40 TABLET ORAL at 17:09

## 2019-06-23 RX ADMIN — ACETAMINOPHEN 650 MG: 325 TABLET, FILM COATED ORAL at 05:13

## 2019-06-23 RX ADMIN — Medication 100 MCG: at 12:06

## 2019-06-23 RX ADMIN — ATORVASTATIN CALCIUM 10 MG: 10 TABLET, FILM COATED ORAL at 15:44

## 2019-06-23 RX ADMIN — CEFEPIME HYDROCHLORIDE 2000 MG: 2 INJECTION, POWDER, FOR SOLUTION INTRAVENOUS at 17:08

## 2019-06-23 RX ADMIN — OXYCODONE HYDROCHLORIDE AND ACETAMINOPHEN 500 MG: 500 TABLET ORAL at 12:10

## 2019-06-23 RX ADMIN — FUROSEMIDE 40 MG: 40 TABLET ORAL at 12:08

## 2019-06-23 RX ADMIN — SODIUM CHLORIDE 1000 ML: 0.9 INJECTION, SOLUTION INTRAVENOUS at 05:45

## 2019-06-23 RX ADMIN — GUAIFENESIN 600 MG: 600 TABLET, EXTENDED RELEASE ORAL at 21:51

## 2019-06-23 RX ADMIN — AZITHROMYCIN MONOHYDRATE 500 MG: 500 INJECTION, POWDER, LYOPHILIZED, FOR SOLUTION INTRAVENOUS at 06:19

## 2019-06-23 RX ADMIN — VANCOMYCIN HYDROCHLORIDE 1500 MG: 10 INJECTION, POWDER, LYOPHILIZED, FOR SOLUTION INTRAVENOUS at 10:22

## 2019-06-23 RX ADMIN — Medication 1000 MG: at 17:08

## 2019-06-23 RX ADMIN — Medication 1000 MG: at 12:08

## 2019-06-23 RX ADMIN — GABAPENTIN 800 MG: 400 CAPSULE ORAL at 15:44

## 2019-06-23 RX ADMIN — IPRATROPIUM BROMIDE AND ALBUTEROL SULFATE 3 ML: 2.5; .5 SOLUTION RESPIRATORY (INHALATION) at 19:20

## 2019-06-23 RX ADMIN — CLOPIDOGREL BISULFATE 75 MG: 75 TABLET ORAL at 12:10

## 2019-06-23 RX ADMIN — IPRATROPIUM BROMIDE AND ALBUTEROL SULFATE 3 ML: 2.5; .5 SOLUTION RESPIRATORY (INHALATION) at 07:16

## 2019-06-24 ENCOUNTER — APPOINTMENT (OUTPATIENT)
Dept: CARDIAC REHAB | Facility: CLINIC | Age: 79
End: 2019-06-24
Payer: MEDICARE

## 2019-06-24 ENCOUNTER — APPOINTMENT (OUTPATIENT)
Dept: PHYSICAL THERAPY | Facility: CLINIC | Age: 79
End: 2019-06-24
Payer: MEDICARE

## 2019-06-24 PROBLEM — R04.2 HEMOPTYSIS: Status: ACTIVE | Noted: 2019-06-24

## 2019-06-24 PROBLEM — E87.6 HYPOKALEMIA: Status: ACTIVE | Noted: 2019-06-24

## 2019-06-24 LAB
ALBUMIN SERPL BCP-MCNC: 2.6 G/DL (ref 3.5–5)
ALP SERPL-CCNC: 41 U/L (ref 46–116)
ALT SERPL W P-5'-P-CCNC: 14 U/L (ref 12–78)
ANION GAP SERPL CALCULATED.3IONS-SCNC: 5 MMOL/L (ref 4–13)
AST SERPL W P-5'-P-CCNC: 11 U/L (ref 5–45)
ATRIAL RATE: 68 BPM
BASOPHILS # BLD AUTO: 0.06 THOUSANDS/ΜL (ref 0–0.1)
BASOPHILS NFR BLD AUTO: 1 % (ref 0–1)
BILIRUB SERPL-MCNC: 1.3 MG/DL (ref 0.2–1)
BUN SERPL-MCNC: 24 MG/DL (ref 5–25)
CALCIUM SERPL-MCNC: 8.8 MG/DL (ref 8.3–10.1)
CHLORIDE SERPL-SCNC: 104 MMOL/L (ref 100–108)
CO2 SERPL-SCNC: 29 MMOL/L (ref 21–32)
CREAT SERPL-MCNC: 1.53 MG/DL (ref 0.6–1.3)
EOSINOPHIL # BLD AUTO: 0.24 THOUSAND/ΜL (ref 0–0.61)
EOSINOPHIL NFR BLD AUTO: 3 % (ref 0–6)
ERYTHROCYTE [DISTWIDTH] IN BLOOD BY AUTOMATED COUNT: 14.4 % (ref 11.6–15.1)
GFR SERPL CREATININE-BSD FRML MDRD: 43 ML/MIN/1.73SQ M
GLUCOSE SERPL-MCNC: 133 MG/DL (ref 65–140)
HCT VFR BLD AUTO: 29.9 % (ref 36.5–49.3)
HGB BLD-MCNC: 9.5 G/DL (ref 12–17)
IMM GRANULOCYTES # BLD AUTO: 0.05 THOUSAND/UL (ref 0–0.2)
IMM GRANULOCYTES NFR BLD AUTO: 1 % (ref 0–2)
LYMPHOCYTES # BLD AUTO: 1.36 THOUSANDS/ΜL (ref 0.6–4.47)
LYMPHOCYTES NFR BLD AUTO: 14 % (ref 14–44)
MCH RBC QN AUTO: 31.1 PG (ref 26.8–34.3)
MCHC RBC AUTO-ENTMCNC: 31.8 G/DL (ref 31.4–37.4)
MCV RBC AUTO: 98 FL (ref 82–98)
MONOCYTES # BLD AUTO: 0.7 THOUSAND/ΜL (ref 0.17–1.22)
MONOCYTES NFR BLD AUTO: 7 % (ref 4–12)
MRSA NOSE QL CULT: NORMAL
NEUTROPHILS # BLD AUTO: 7.04 THOUSANDS/ΜL (ref 1.85–7.62)
NEUTS SEG NFR BLD AUTO: 74 % (ref 43–75)
NRBC BLD AUTO-RTO: 0 /100 WBCS
PLATELET # BLD AUTO: 145 THOUSANDS/UL (ref 149–390)
PMV BLD AUTO: 10.7 FL (ref 8.9–12.7)
POTASSIUM SERPL-SCNC: 3.3 MMOL/L (ref 3.5–5.3)
PROCALCITONIN SERPL-MCNC: 2.57 NG/ML
PROT SERPL-MCNC: 6.4 G/DL (ref 6.4–8.2)
QRS AXIS: -69 DEGREES
QRSD INTERVAL: 156 MS
QT INTERVAL: 442 MS
QTC INTERVAL: 483 MS
RBC # BLD AUTO: 3.05 MILLION/UL (ref 3.88–5.62)
SODIUM SERPL-SCNC: 138 MMOL/L (ref 136–145)
T WAVE AXIS: 103 DEGREES
VENTRICULAR RATE: 72 BPM
WBC # BLD AUTO: 9.45 THOUSAND/UL (ref 4.31–10.16)

## 2019-06-24 PROCEDURE — 80053 COMPREHEN METABOLIC PANEL: CPT | Performed by: INTERNAL MEDICINE

## 2019-06-24 PROCEDURE — G8988 SELF CARE GOAL STATUS: HCPCS

## 2019-06-24 PROCEDURE — 84145 PROCALCITONIN (PCT): CPT | Performed by: INTERNAL MEDICINE

## 2019-06-24 PROCEDURE — G8978 MOBILITY CURRENT STATUS: HCPCS

## 2019-06-24 PROCEDURE — 94760 N-INVAS EAR/PLS OXIMETRY 1: CPT

## 2019-06-24 PROCEDURE — 94640 AIRWAY INHALATION TREATMENT: CPT

## 2019-06-24 PROCEDURE — G8987 SELF CARE CURRENT STATUS: HCPCS

## 2019-06-24 PROCEDURE — 97163 PT EVAL HIGH COMPLEX 45 MIN: CPT

## 2019-06-24 PROCEDURE — G8979 MOBILITY GOAL STATUS: HCPCS

## 2019-06-24 PROCEDURE — 97167 OT EVAL HIGH COMPLEX 60 MIN: CPT

## 2019-06-24 PROCEDURE — 93010 ELECTROCARDIOGRAM REPORT: CPT | Performed by: INTERNAL MEDICINE

## 2019-06-24 PROCEDURE — 85025 COMPLETE CBC W/AUTO DIFF WBC: CPT | Performed by: INTERNAL MEDICINE

## 2019-06-24 PROCEDURE — 94668 MNPJ CHEST WALL SBSQ: CPT

## 2019-06-24 PROCEDURE — 99232 SBSQ HOSP IP/OBS MODERATE 35: CPT | Performed by: INTERNAL MEDICINE

## 2019-06-24 RX ORDER — POTASSIUM CHLORIDE 20 MEQ/1
40 TABLET, EXTENDED RELEASE ORAL ONCE
Status: COMPLETED | OUTPATIENT
Start: 2019-06-24 | End: 2019-06-24

## 2019-06-24 RX ADMIN — CEFEPIME HYDROCHLORIDE 2000 MG: 2 INJECTION, POWDER, FOR SOLUTION INTRAVENOUS at 01:38

## 2019-06-24 RX ADMIN — VANCOMYCIN HYDROCHLORIDE 1500 MG: 10 INJECTION, POWDER, LYOPHILIZED, FOR SOLUTION INTRAVENOUS at 11:15

## 2019-06-24 RX ADMIN — CEFEPIME HYDROCHLORIDE 2000 MG: 2 INJECTION, POWDER, FOR SOLUTION INTRAVENOUS at 10:37

## 2019-06-24 RX ADMIN — FLUTICASONE FUROATE AND VILANTEROL TRIFENATATE 1 PUFF: 100; 25 POWDER RESPIRATORY (INHALATION) at 08:40

## 2019-06-24 RX ADMIN — METOPROLOL TARTRATE 25 MG: 25 TABLET, FILM COATED ORAL at 08:44

## 2019-06-24 RX ADMIN — CLOPIDOGREL BISULFATE 75 MG: 75 TABLET ORAL at 08:44

## 2019-06-24 RX ADMIN — FELODIPINE 5 MG: 2.5 TABLET, FILM COATED, EXTENDED RELEASE ORAL at 08:39

## 2019-06-24 RX ADMIN — Medication 100 MCG: at 08:40

## 2019-06-24 RX ADMIN — IPRATROPIUM BROMIDE AND ALBUTEROL SULFATE 3 ML: 2.5; .5 SOLUTION RESPIRATORY (INHALATION) at 19:46

## 2019-06-24 RX ADMIN — VANCOMYCIN HYDROCHLORIDE 1500 MG: 10 INJECTION, POWDER, LYOPHILIZED, FOR SOLUTION INTRAVENOUS at 22:12

## 2019-06-24 RX ADMIN — RIVAROXABAN 20 MG: 10 TABLET, FILM COATED ORAL at 08:41

## 2019-06-24 RX ADMIN — GABAPENTIN 800 MG: 400 CAPSULE ORAL at 21:45

## 2019-06-24 RX ADMIN — IPRATROPIUM BROMIDE AND ALBUTEROL SULFATE 3 ML: 2.5; .5 SOLUTION RESPIRATORY (INHALATION) at 02:35

## 2019-06-24 RX ADMIN — GABAPENTIN 800 MG: 400 CAPSULE ORAL at 16:22

## 2019-06-24 RX ADMIN — GUAIFENESIN 600 MG: 600 TABLET, EXTENDED RELEASE ORAL at 21:45

## 2019-06-24 RX ADMIN — IPRATROPIUM BROMIDE AND ALBUTEROL SULFATE 3 ML: 2.5; .5 SOLUTION RESPIRATORY (INHALATION) at 13:02

## 2019-06-24 RX ADMIN — FUROSEMIDE 40 MG: 40 TABLET ORAL at 08:44

## 2019-06-24 RX ADMIN — CALCIUM CARBONATE 500 MG (1,250 MG)-VITAMIN D3 200 UNIT TABLET 1 TABLET: at 08:43

## 2019-06-24 RX ADMIN — POTASSIUM CHLORIDE 40 MEQ: 1500 TABLET, EXTENDED RELEASE ORAL at 10:36

## 2019-06-24 RX ADMIN — ALLOPURINOL 200 MG: 100 TABLET ORAL at 08:42

## 2019-06-24 RX ADMIN — FUROSEMIDE 40 MG: 40 TABLET ORAL at 18:38

## 2019-06-24 RX ADMIN — PAROXETINE 10 MG: 20 TABLET, FILM COATED ORAL at 08:43

## 2019-06-24 RX ADMIN — FINASTERIDE 5 MG: 5 TABLET, FILM COATED ORAL at 08:41

## 2019-06-24 RX ADMIN — METOPROLOL TARTRATE 25 MG: 25 TABLET, FILM COATED ORAL at 21:45

## 2019-06-24 RX ADMIN — GUAIFENESIN 600 MG: 600 TABLET, EXTENDED RELEASE ORAL at 08:48

## 2019-06-24 RX ADMIN — CEFEPIME HYDROCHLORIDE 2000 MG: 2 INJECTION, POWDER, FOR SOLUTION INTRAVENOUS at 18:38

## 2019-06-24 RX ADMIN — ATORVASTATIN CALCIUM 10 MG: 10 TABLET, FILM COATED ORAL at 16:22

## 2019-06-24 RX ADMIN — GABAPENTIN 800 MG: 400 CAPSULE ORAL at 08:42

## 2019-06-24 RX ADMIN — CALCIUM CARBONATE 500 MG (1,250 MG)-VITAMIN D3 200 UNIT TABLET 1 TABLET: at 16:22

## 2019-06-24 RX ADMIN — IPRATROPIUM BROMIDE AND ALBUTEROL SULFATE 3 ML: 2.5; .5 SOLUTION RESPIRATORY (INHALATION) at 07:44

## 2019-06-24 RX ADMIN — OXYCODONE HYDROCHLORIDE AND ACETAMINOPHEN 500 MG: 500 TABLET ORAL at 08:42

## 2019-06-24 RX ADMIN — Medication 1000 MG: at 18:38

## 2019-06-24 RX ADMIN — Medication 1000 MG: at 08:41

## 2019-06-25 PROBLEM — A41.9 SEPSIS (HCC): Status: RESOLVED | Noted: 2019-06-23 | Resolved: 2019-06-25

## 2019-06-25 PROBLEM — J18.9 RIGHT UPPER LOBE PNEUMONIA: Status: RESOLVED | Noted: 2019-04-08 | Resolved: 2019-06-25

## 2019-06-25 PROBLEM — E87.6 HYPOKALEMIA: Status: RESOLVED | Noted: 2019-06-24 | Resolved: 2019-06-25

## 2019-06-25 PROBLEM — R04.2 HEMOPTYSIS: Status: RESOLVED | Noted: 2019-06-24 | Resolved: 2019-06-25

## 2019-06-25 PROBLEM — I48.20 CHRONIC ATRIAL FIBRILLATION (HCC): Status: ACTIVE | Noted: 2017-08-21

## 2019-06-25 LAB
ALBUMIN SERPL BCP-MCNC: 2.8 G/DL (ref 3.5–5)
ALP SERPL-CCNC: 43 U/L (ref 46–116)
ALT SERPL W P-5'-P-CCNC: 17 U/L (ref 12–78)
ANION GAP SERPL CALCULATED.3IONS-SCNC: 11 MMOL/L (ref 4–13)
AST SERPL W P-5'-P-CCNC: 14 U/L (ref 5–45)
BASOPHILS # BLD AUTO: 0.07 THOUSANDS/ΜL (ref 0–0.1)
BASOPHILS NFR BLD AUTO: 1 % (ref 0–1)
BILIRUB DIRECT SERPL-MCNC: 0.3 MG/DL (ref 0–0.2)
BILIRUB SERPL-MCNC: 1.4 MG/DL (ref 0.2–1)
BUN SERPL-MCNC: 26 MG/DL (ref 5–25)
CALCIUM SERPL-MCNC: 8.8 MG/DL (ref 8.3–10.1)
CHLORIDE SERPL-SCNC: 102 MMOL/L (ref 100–108)
CO2 SERPL-SCNC: 25 MMOL/L (ref 21–32)
CREAT SERPL-MCNC: 1.62 MG/DL (ref 0.6–1.3)
EOSINOPHIL # BLD AUTO: 0.24 THOUSAND/ΜL (ref 0–0.61)
EOSINOPHIL NFR BLD AUTO: 3 % (ref 0–6)
ERYTHROCYTE [DISTWIDTH] IN BLOOD BY AUTOMATED COUNT: 14.1 % (ref 11.6–15.1)
GFR SERPL CREATININE-BSD FRML MDRD: 40 ML/MIN/1.73SQ M
GLUCOSE SERPL-MCNC: 146 MG/DL (ref 65–140)
HCT VFR BLD AUTO: 32 % (ref 36.5–49.3)
HGB BLD-MCNC: 10 G/DL (ref 12–17)
IMM GRANULOCYTES # BLD AUTO: 0.05 THOUSAND/UL (ref 0–0.2)
IMM GRANULOCYTES NFR BLD AUTO: 1 % (ref 0–2)
LYMPHOCYTES # BLD AUTO: 1.33 THOUSANDS/ΜL (ref 0.6–4.47)
LYMPHOCYTES NFR BLD AUTO: 14 % (ref 14–44)
MCH RBC QN AUTO: 31.1 PG (ref 26.8–34.3)
MCHC RBC AUTO-ENTMCNC: 31.3 G/DL (ref 31.4–37.4)
MCV RBC AUTO: 99 FL (ref 82–98)
MONOCYTES # BLD AUTO: 0.58 THOUSAND/ΜL (ref 0.17–1.22)
MONOCYTES NFR BLD AUTO: 6 % (ref 4–12)
NEUTROPHILS # BLD AUTO: 7.51 THOUSANDS/ΜL (ref 1.85–7.62)
NEUTS SEG NFR BLD AUTO: 75 % (ref 43–75)
NRBC BLD AUTO-RTO: 0 /100 WBCS
PLATELET # BLD AUTO: 150 THOUSANDS/UL (ref 149–390)
PMV BLD AUTO: 9.7 FL (ref 8.9–12.7)
POTASSIUM SERPL-SCNC: 3.9 MMOL/L (ref 3.5–5.3)
PROCALCITONIN SERPL-MCNC: 1.96 NG/ML
PROT SERPL-MCNC: 7.2 G/DL (ref 6.4–8.2)
RBC # BLD AUTO: 3.22 MILLION/UL (ref 3.88–5.62)
SODIUM SERPL-SCNC: 138 MMOL/L (ref 136–145)
WBC # BLD AUTO: 9.78 THOUSAND/UL (ref 4.31–10.16)

## 2019-06-25 PROCEDURE — 80076 HEPATIC FUNCTION PANEL: CPT | Performed by: INTERNAL MEDICINE

## 2019-06-25 PROCEDURE — 80048 BASIC METABOLIC PNL TOTAL CA: CPT | Performed by: INTERNAL MEDICINE

## 2019-06-25 PROCEDURE — 94640 AIRWAY INHALATION TREATMENT: CPT

## 2019-06-25 PROCEDURE — 99232 SBSQ HOSP IP/OBS MODERATE 35: CPT | Performed by: STUDENT IN AN ORGANIZED HEALTH CARE EDUCATION/TRAINING PROGRAM

## 2019-06-25 PROCEDURE — 84145 PROCALCITONIN (PCT): CPT | Performed by: INTERNAL MEDICINE

## 2019-06-25 PROCEDURE — 85025 COMPLETE CBC W/AUTO DIFF WBC: CPT | Performed by: INTERNAL MEDICINE

## 2019-06-25 PROCEDURE — 94760 N-INVAS EAR/PLS OXIMETRY 1: CPT

## 2019-06-25 RX ORDER — FUROSEMIDE 10 MG/ML
20 INJECTION INTRAMUSCULAR; INTRAVENOUS ONCE
Status: COMPLETED | OUTPATIENT
Start: 2019-06-25 | End: 2019-06-25

## 2019-06-25 RX ADMIN — CALCIUM CARBONATE 500 MG (1,250 MG)-VITAMIN D3 200 UNIT TABLET 1 TABLET: at 09:24

## 2019-06-25 RX ADMIN — Medication 1000 MG: at 09:24

## 2019-06-25 RX ADMIN — CLOPIDOGREL BISULFATE 75 MG: 75 TABLET ORAL at 09:24

## 2019-06-25 RX ADMIN — OXYCODONE HYDROCHLORIDE AND ACETAMINOPHEN 500 MG: 500 TABLET ORAL at 09:24

## 2019-06-25 RX ADMIN — VANCOMYCIN HYDROCHLORIDE 1500 MG: 10 INJECTION, POWDER, LYOPHILIZED, FOR SOLUTION INTRAVENOUS at 10:47

## 2019-06-25 RX ADMIN — GABAPENTIN 800 MG: 400 CAPSULE ORAL at 09:23

## 2019-06-25 RX ADMIN — GUAIFENESIN 600 MG: 600 TABLET, EXTENDED RELEASE ORAL at 09:24

## 2019-06-25 RX ADMIN — METOPROLOL TARTRATE 25 MG: 25 TABLET, FILM COATED ORAL at 09:25

## 2019-06-25 RX ADMIN — METOPROLOL TARTRATE 25 MG: 25 TABLET, FILM COATED ORAL at 21:31

## 2019-06-25 RX ADMIN — CALCIUM CARBONATE 500 MG (1,250 MG)-VITAMIN D3 200 UNIT TABLET 1 TABLET: at 17:30

## 2019-06-25 RX ADMIN — PAROXETINE 10 MG: 20 TABLET, FILM COATED ORAL at 09:25

## 2019-06-25 RX ADMIN — IPRATROPIUM BROMIDE AND ALBUTEROL SULFATE 3 ML: 2.5; .5 SOLUTION RESPIRATORY (INHALATION) at 07:34

## 2019-06-25 RX ADMIN — GABAPENTIN 800 MG: 400 CAPSULE ORAL at 21:31

## 2019-06-25 RX ADMIN — CEFEPIME HYDROCHLORIDE 2000 MG: 2 INJECTION, POWDER, FOR SOLUTION INTRAVENOUS at 01:19

## 2019-06-25 RX ADMIN — CEFEPIME HYDROCHLORIDE 2000 MG: 2 INJECTION, POWDER, FOR SOLUTION INTRAVENOUS at 17:30

## 2019-06-25 RX ADMIN — IPRATROPIUM BROMIDE AND ALBUTEROL SULFATE 3 ML: 2.5; .5 SOLUTION RESPIRATORY (INHALATION) at 02:01

## 2019-06-25 RX ADMIN — ATORVASTATIN CALCIUM 10 MG: 10 TABLET, FILM COATED ORAL at 17:30

## 2019-06-25 RX ADMIN — FUROSEMIDE 20 MG: 10 INJECTION, SOLUTION INTRAMUSCULAR; INTRAVENOUS at 18:49

## 2019-06-25 RX ADMIN — Medication 100 MCG: at 09:23

## 2019-06-25 RX ADMIN — FLUTICASONE FUROATE AND VILANTEROL TRIFENATATE 1 PUFF: 100; 25 POWDER RESPIRATORY (INHALATION) at 09:23

## 2019-06-25 RX ADMIN — FUROSEMIDE 40 MG: 40 TABLET ORAL at 17:30

## 2019-06-25 RX ADMIN — CEFEPIME HYDROCHLORIDE 2000 MG: 2 INJECTION, POWDER, FOR SOLUTION INTRAVENOUS at 10:47

## 2019-06-25 RX ADMIN — GUAIFENESIN 600 MG: 600 TABLET, EXTENDED RELEASE ORAL at 21:31

## 2019-06-25 RX ADMIN — ALLOPURINOL 200 MG: 100 TABLET ORAL at 09:23

## 2019-06-25 RX ADMIN — Medication 3000 UNITS: at 09:22

## 2019-06-25 RX ADMIN — GABAPENTIN 800 MG: 400 CAPSULE ORAL at 17:29

## 2019-06-25 RX ADMIN — IPRATROPIUM BROMIDE AND ALBUTEROL SULFATE 3 ML: 2.5; .5 SOLUTION RESPIRATORY (INHALATION) at 19:11

## 2019-06-25 RX ADMIN — FINASTERIDE 5 MG: 5 TABLET, FILM COATED ORAL at 09:24

## 2019-06-25 RX ADMIN — IPRATROPIUM BROMIDE AND ALBUTEROL SULFATE 3 ML: 2.5; .5 SOLUTION RESPIRATORY (INHALATION) at 14:29

## 2019-06-25 RX ADMIN — FUROSEMIDE 40 MG: 40 TABLET ORAL at 09:24

## 2019-06-25 RX ADMIN — Medication 1000 MG: at 17:30

## 2019-06-25 RX ADMIN — RIVAROXABAN 20 MG: 10 TABLET, FILM COATED ORAL at 09:24

## 2019-06-25 RX ADMIN — FELODIPINE 5 MG: 2.5 TABLET, FILM COATED, EXTENDED RELEASE ORAL at 09:23

## 2019-06-26 ENCOUNTER — TRANSITIONAL CARE MANAGEMENT (OUTPATIENT)
Dept: FAMILY MEDICINE CLINIC | Facility: CLINIC | Age: 79
End: 2019-06-26

## 2019-06-26 ENCOUNTER — APPOINTMENT (OUTPATIENT)
Dept: CARDIAC REHAB | Facility: CLINIC | Age: 79
End: 2019-06-26
Payer: MEDICARE

## 2019-06-26 VITALS
OXYGEN SATURATION: 94 % | DIASTOLIC BLOOD PRESSURE: 57 MMHG | HEIGHT: 73 IN | BODY MASS INDEX: 41.75 KG/M2 | RESPIRATION RATE: 18 BRPM | WEIGHT: 315 LBS | HEART RATE: 71 BPM | TEMPERATURE: 98.3 F | SYSTOLIC BLOOD PRESSURE: 126 MMHG

## 2019-06-26 PROBLEM — J96.01 ACUTE RESPIRATORY FAILURE WITH HYPOXIA (HCC): Status: RESOLVED | Noted: 2019-04-11 | Resolved: 2019-06-26

## 2019-06-26 LAB
ANION GAP SERPL CALCULATED.3IONS-SCNC: 10 MMOL/L (ref 4–13)
BACTERIA BLD CULT: ABNORMAL
BACTERIA SPT RESP CULT: ABNORMAL
BACTERIA SPT RESP CULT: ABNORMAL
BASOPHILS # BLD AUTO: 0.05 THOUSANDS/ΜL (ref 0–0.1)
BASOPHILS NFR BLD AUTO: 1 % (ref 0–1)
BUN SERPL-MCNC: 31 MG/DL (ref 5–25)
CALCIUM SERPL-MCNC: 9.5 MG/DL (ref 8.3–10.1)
CHLORIDE SERPL-SCNC: 102 MMOL/L (ref 100–108)
CO2 SERPL-SCNC: 27 MMOL/L (ref 21–32)
CREAT SERPL-MCNC: 1.57 MG/DL (ref 0.6–1.3)
EOSINOPHIL # BLD AUTO: 0.32 THOUSAND/ΜL (ref 0–0.61)
EOSINOPHIL NFR BLD AUTO: 4 % (ref 0–6)
ERYTHROCYTE [DISTWIDTH] IN BLOOD BY AUTOMATED COUNT: 14 % (ref 11.6–15.1)
GFR SERPL CREATININE-BSD FRML MDRD: 41 ML/MIN/1.73SQ M
GLUCOSE SERPL-MCNC: 127 MG/DL (ref 65–140)
GRAM STN SPEC: ABNORMAL
HCT VFR BLD AUTO: 29.3 % (ref 36.5–49.3)
HGB BLD-MCNC: 9.3 G/DL (ref 12–17)
IMM GRANULOCYTES # BLD AUTO: 0.06 THOUSAND/UL (ref 0–0.2)
IMM GRANULOCYTES NFR BLD AUTO: 1 % (ref 0–2)
LYMPHOCYTES # BLD AUTO: 0.98 THOUSANDS/ΜL (ref 0.6–4.47)
LYMPHOCYTES NFR BLD AUTO: 13 % (ref 14–44)
MCH RBC QN AUTO: 31 PG (ref 26.8–34.3)
MCHC RBC AUTO-ENTMCNC: 31.7 G/DL (ref 31.4–37.4)
MCV RBC AUTO: 98 FL (ref 82–98)
MONOCYTES # BLD AUTO: 0.55 THOUSAND/ΜL (ref 0.17–1.22)
MONOCYTES NFR BLD AUTO: 7 % (ref 4–12)
NEUTROPHILS # BLD AUTO: 5.6 THOUSANDS/ΜL (ref 1.85–7.62)
NEUTS SEG NFR BLD AUTO: 74 % (ref 43–75)
NRBC BLD AUTO-RTO: 0 /100 WBCS
PLATELET # BLD AUTO: 137 THOUSANDS/UL (ref 149–390)
PMV BLD AUTO: 9.5 FL (ref 8.9–12.7)
POTASSIUM SERPL-SCNC: 3.5 MMOL/L (ref 3.5–5.3)
PROCALCITONIN SERPL-MCNC: 1.46 NG/ML
RBC # BLD AUTO: 3 MILLION/UL (ref 3.88–5.62)
SODIUM SERPL-SCNC: 139 MMOL/L (ref 136–145)
WBC # BLD AUTO: 7.56 THOUSAND/UL (ref 4.31–10.16)

## 2019-06-26 PROCEDURE — 85025 COMPLETE CBC W/AUTO DIFF WBC: CPT | Performed by: STUDENT IN AN ORGANIZED HEALTH CARE EDUCATION/TRAINING PROGRAM

## 2019-06-26 PROCEDURE — 94618 PULMONARY STRESS TESTING: CPT

## 2019-06-26 PROCEDURE — 84145 PROCALCITONIN (PCT): CPT | Performed by: STUDENT IN AN ORGANIZED HEALTH CARE EDUCATION/TRAINING PROGRAM

## 2019-06-26 PROCEDURE — 97530 THERAPEUTIC ACTIVITIES: CPT

## 2019-06-26 PROCEDURE — 80048 BASIC METABOLIC PNL TOTAL CA: CPT | Performed by: STUDENT IN AN ORGANIZED HEALTH CARE EDUCATION/TRAINING PROGRAM

## 2019-06-26 PROCEDURE — 99239 HOSP IP/OBS DSCHRG MGMT >30: CPT | Performed by: STUDENT IN AN ORGANIZED HEALTH CARE EDUCATION/TRAINING PROGRAM

## 2019-06-26 PROCEDURE — 94760 N-INVAS EAR/PLS OXIMETRY 1: CPT

## 2019-06-26 PROCEDURE — 94640 AIRWAY INHALATION TREATMENT: CPT

## 2019-06-26 RX ORDER — GUAIFENESIN/DEXTROMETHORPHAN 100-10MG/5
10 SYRUP ORAL EVERY 4 HOURS PRN
Qty: 118 ML | Refills: 0 | Status: SHIPPED | OUTPATIENT
Start: 2019-06-26 | End: 2022-08-08

## 2019-06-26 RX ORDER — GUAIFENESIN 600 MG
600 TABLET, EXTENDED RELEASE 12 HR ORAL EVERY 12 HOURS SCHEDULED
Qty: 10 TABLET | Refills: 0 | Status: SHIPPED | OUTPATIENT
Start: 2019-06-26 | End: 2019-07-01

## 2019-06-26 RX ORDER — CEFUROXIME AXETIL 500 MG/1
500 TABLET ORAL EVERY 12 HOURS SCHEDULED
Qty: 20 TABLET | Refills: 0 | Status: SHIPPED | OUTPATIENT
Start: 2019-06-26 | End: 2019-07-06

## 2019-06-26 RX ORDER — GUAIFENESIN 600 MG
600 TABLET, EXTENDED RELEASE 12 HR ORAL EVERY 12 HOURS SCHEDULED
Qty: 10 TABLET | Refills: 0 | Status: SHIPPED | OUTPATIENT
Start: 2019-06-26 | End: 2019-06-26

## 2019-06-26 RX ORDER — GUAIFENESIN/DEXTROMETHORPHAN 100-10MG/5
10 SYRUP ORAL EVERY 4 HOURS PRN
Qty: 118 ML | Refills: 0 | Status: SHIPPED | OUTPATIENT
Start: 2019-06-26 | End: 2019-06-26

## 2019-06-26 RX ORDER — CEFUROXIME AXETIL 500 MG/1
500 TABLET ORAL EVERY 12 HOURS SCHEDULED
Qty: 20 TABLET | Refills: 0 | Status: SHIPPED | OUTPATIENT
Start: 2019-06-26 | End: 2019-06-26 | Stop reason: SDUPTHER

## 2019-06-26 RX ORDER — FUROSEMIDE 10 MG/ML
20 INJECTION INTRAMUSCULAR; INTRAVENOUS ONCE
Status: COMPLETED | OUTPATIENT
Start: 2019-06-26 | End: 2019-06-26

## 2019-06-26 RX ADMIN — OXYCODONE HYDROCHLORIDE AND ACETAMINOPHEN 500 MG: 500 TABLET ORAL at 09:22

## 2019-06-26 RX ADMIN — RIVAROXABAN 20 MG: 10 TABLET, FILM COATED ORAL at 09:21

## 2019-06-26 RX ADMIN — IPRATROPIUM BROMIDE AND ALBUTEROL SULFATE 3 ML: 2.5; .5 SOLUTION RESPIRATORY (INHALATION) at 02:11

## 2019-06-26 RX ADMIN — CLOPIDOGREL BISULFATE 75 MG: 75 TABLET ORAL at 09:21

## 2019-06-26 RX ADMIN — FUROSEMIDE 20 MG: 10 INJECTION, SOLUTION INTRAMUSCULAR; INTRAVENOUS at 11:47

## 2019-06-26 RX ADMIN — FELODIPINE 5 MG: 2.5 TABLET, FILM COATED, EXTENDED RELEASE ORAL at 09:23

## 2019-06-26 RX ADMIN — ALLOPURINOL 200 MG: 100 TABLET ORAL at 09:20

## 2019-06-26 RX ADMIN — FLUTICASONE FUROATE AND VILANTEROL TRIFENATATE 1 PUFF: 100; 25 POWDER RESPIRATORY (INHALATION) at 09:23

## 2019-06-26 RX ADMIN — IPRATROPIUM BROMIDE AND ALBUTEROL SULFATE 3 ML: 2.5; .5 SOLUTION RESPIRATORY (INHALATION) at 08:14

## 2019-06-26 RX ADMIN — GUAIFENESIN 600 MG: 600 TABLET, EXTENDED RELEASE ORAL at 09:21

## 2019-06-26 RX ADMIN — CALCIUM CARBONATE 500 MG (1,250 MG)-VITAMIN D3 200 UNIT TABLET 1 TABLET: at 09:20

## 2019-06-26 RX ADMIN — Medication 3000 UNITS: at 09:22

## 2019-06-26 RX ADMIN — GABAPENTIN 800 MG: 400 CAPSULE ORAL at 09:20

## 2019-06-26 RX ADMIN — Medication 1000 MG: at 09:20

## 2019-06-26 RX ADMIN — METOPROLOL TARTRATE 25 MG: 25 TABLET, FILM COATED ORAL at 09:20

## 2019-06-26 RX ADMIN — CEFEPIME HYDROCHLORIDE 2000 MG: 2 INJECTION, POWDER, FOR SOLUTION INTRAVENOUS at 01:56

## 2019-06-26 RX ADMIN — CEFEPIME HYDROCHLORIDE 2000 MG: 2 INJECTION, POWDER, FOR SOLUTION INTRAVENOUS at 10:47

## 2019-06-26 RX ADMIN — FUROSEMIDE 40 MG: 40 TABLET ORAL at 09:22

## 2019-06-26 RX ADMIN — PAROXETINE 10 MG: 20 TABLET, FILM COATED ORAL at 09:22

## 2019-06-26 RX ADMIN — Medication 100 MCG: at 09:23

## 2019-06-26 RX ADMIN — FINASTERIDE 5 MG: 5 TABLET, FILM COATED ORAL at 09:21

## 2019-06-27 ENCOUNTER — EPISODE CHANGES (OUTPATIENT)
Dept: CASE MANAGEMENT | Facility: HOSPITAL | Age: 79
End: 2019-06-27

## 2019-06-27 ENCOUNTER — TELEPHONE (OUTPATIENT)
Dept: FAMILY MEDICINE CLINIC | Facility: CLINIC | Age: 79
End: 2019-06-27

## 2019-06-27 ENCOUNTER — APPOINTMENT (OUTPATIENT)
Dept: PHYSICAL THERAPY | Facility: CLINIC | Age: 79
End: 2019-06-27
Payer: MEDICARE

## 2019-06-27 DIAGNOSIS — J18.9 PNEUMONIA OF RIGHT LOWER LOBE DUE TO INFECTIOUS ORGANISM: Primary | ICD-10-CM

## 2019-06-28 ENCOUNTER — PATIENT OUTREACH (OUTPATIENT)
Dept: FAMILY MEDICINE CLINIC | Facility: CLINIC | Age: 79
End: 2019-06-28

## 2019-06-28 LAB — BACTERIA BLD CULT: NORMAL

## 2019-07-01 ENCOUNTER — APPOINTMENT (OUTPATIENT)
Dept: CARDIAC REHAB | Facility: CLINIC | Age: 79
End: 2019-07-01
Payer: MEDICARE

## 2019-07-01 ENCOUNTER — APPOINTMENT (OUTPATIENT)
Dept: PHYSICAL THERAPY | Facility: CLINIC | Age: 79
End: 2019-07-01
Payer: MEDICARE

## 2019-07-02 ENCOUNTER — APPOINTMENT (OUTPATIENT)
Dept: CARDIAC REHAB | Facility: CLINIC | Age: 79
End: 2019-07-02
Payer: MEDICARE

## 2019-07-03 ENCOUNTER — APPOINTMENT (OUTPATIENT)
Dept: PHYSICAL THERAPY | Facility: CLINIC | Age: 79
End: 2019-07-03
Payer: MEDICARE

## 2019-07-03 ENCOUNTER — APPOINTMENT (OUTPATIENT)
Dept: CARDIAC REHAB | Facility: CLINIC | Age: 79
End: 2019-07-03
Payer: MEDICARE

## 2019-07-05 ENCOUNTER — APPOINTMENT (OUTPATIENT)
Dept: CARDIAC REHAB | Facility: CLINIC | Age: 79
End: 2019-07-05
Payer: MEDICARE

## 2019-07-08 ENCOUNTER — APPOINTMENT (OUTPATIENT)
Dept: CARDIAC REHAB | Facility: CLINIC | Age: 79
End: 2019-07-08
Payer: MEDICARE

## 2019-07-08 ENCOUNTER — APPOINTMENT (OUTPATIENT)
Dept: PHYSICAL THERAPY | Facility: CLINIC | Age: 79
End: 2019-07-08
Payer: MEDICARE

## 2019-07-09 ENCOUNTER — OFFICE VISIT (OUTPATIENT)
Dept: FAMILY MEDICINE CLINIC | Facility: CLINIC | Age: 79
End: 2019-07-09
Payer: MEDICARE

## 2019-07-09 ENCOUNTER — PATIENT OUTREACH (OUTPATIENT)
Dept: FAMILY MEDICINE CLINIC | Facility: CLINIC | Age: 79
End: 2019-07-09

## 2019-07-09 VITALS
DIASTOLIC BLOOD PRESSURE: 70 MMHG | RESPIRATION RATE: 18 BRPM | OXYGEN SATURATION: 93 % | TEMPERATURE: 98.8 F | HEIGHT: 73 IN | BODY MASS INDEX: 41.75 KG/M2 | WEIGHT: 315 LBS | HEART RATE: 70 BPM | SYSTOLIC BLOOD PRESSURE: 132 MMHG

## 2019-07-09 DIAGNOSIS — I25.10 CORONARY ARTERY DISEASE INVOLVING NATIVE CORONARY ARTERY OF NATIVE HEART WITHOUT ANGINA PECTORIS: Chronic | ICD-10-CM

## 2019-07-09 DIAGNOSIS — E79.0 HYPERURICEMIA: ICD-10-CM

## 2019-07-09 DIAGNOSIS — Z95.5 STATUS POST PRIMARY ANGIOPLASTY WITH CORONARY STENT: Chronic | ICD-10-CM

## 2019-07-09 DIAGNOSIS — I48.20 CHRONIC ATRIAL FIBRILLATION (HCC): ICD-10-CM

## 2019-07-09 DIAGNOSIS — I25.10 CORONARY ARTERY ARTERIOSCLEROSIS: ICD-10-CM

## 2019-07-09 DIAGNOSIS — I10 ESSENTIAL HYPERTENSION: ICD-10-CM

## 2019-07-09 DIAGNOSIS — G47.30 SLEEP APNEA, UNSPECIFIED TYPE: ICD-10-CM

## 2019-07-09 DIAGNOSIS — I25.10 CAD (CORONARY ARTERY DISEASE): Chronic | ICD-10-CM

## 2019-07-09 DIAGNOSIS — I50.32 CHRONIC DIASTOLIC CONGESTIVE HEART FAILURE (HCC): ICD-10-CM

## 2019-07-09 DIAGNOSIS — J44.0 CHRONIC OBSTRUCTIVE PULMONARY DISEASE WITH ACUTE LOWER RESPIRATORY INFECTION (HCC): ICD-10-CM

## 2019-07-09 DIAGNOSIS — E66.01 MORBID OBESITY (HCC): ICD-10-CM

## 2019-07-09 DIAGNOSIS — Z23 NEED FOR PNEUMOCOCCAL VACCINE: ICD-10-CM

## 2019-07-09 DIAGNOSIS — N42.9 PROSTATE DISORDER: ICD-10-CM

## 2019-07-09 DIAGNOSIS — J18.9 PNEUMONIA OF RIGHT LOWER LOBE DUE TO INFECTIOUS ORGANISM: Primary | ICD-10-CM

## 2019-07-09 PROCEDURE — 99495 TRANSJ CARE MGMT MOD F2F 14D: CPT | Performed by: FAMILY MEDICINE

## 2019-07-09 PROCEDURE — G0009 ADMIN PNEUMOCOCCAL VACCINE: HCPCS

## 2019-07-09 PROCEDURE — 90670 PCV13 VACCINE IM: CPT

## 2019-07-09 RX ORDER — CLOPIDOGREL BISULFATE 75 MG/1
75 TABLET ORAL DAILY
Qty: 90 TABLET | Refills: 3 | Status: SHIPPED | OUTPATIENT
Start: 2019-07-09 | End: 2019-07-16 | Stop reason: SDUPTHER

## 2019-07-09 RX ORDER — FINASTERIDE 5 MG/1
5 TABLET, FILM COATED ORAL DAILY
Qty: 90 TABLET | Refills: 3 | Status: SHIPPED | OUTPATIENT
Start: 2019-07-09 | End: 2019-07-16 | Stop reason: SDUPTHER

## 2019-07-09 RX ORDER — ALLOPURINOL 100 MG/1
200 TABLET ORAL DAILY
Qty: 60 TABLET | Refills: 5 | Status: SHIPPED | OUTPATIENT
Start: 2019-07-09 | End: 2020-03-13 | Stop reason: SDUPTHER

## 2019-07-09 RX ORDER — FELODIPINE 5 MG/1
5 TABLET, EXTENDED RELEASE ORAL DAILY
Qty: 90 TABLET | Refills: 3 | Status: SHIPPED | OUTPATIENT
Start: 2019-07-09 | End: 2019-07-16 | Stop reason: SDUPTHER

## 2019-07-09 RX ORDER — ALLOPURINOL 100 MG/1
200 TABLET ORAL DAILY
Qty: 60 TABLET | Refills: 5 | Status: SHIPPED | OUTPATIENT
Start: 2019-07-09 | End: 2019-07-09 | Stop reason: SDUPTHER

## 2019-07-09 RX ORDER — FUROSEMIDE 40 MG/1
40 TABLET ORAL 2 TIMES DAILY
Qty: 180 TABLET | Refills: 3 | Status: SHIPPED | OUTPATIENT
Start: 2019-07-09 | End: 2019-08-19 | Stop reason: SDUPTHER

## 2019-07-09 RX ORDER — SIMVASTATIN 20 MG
20 TABLET ORAL
Qty: 90 TABLET | Refills: 3 | Status: SHIPPED | OUTPATIENT
Start: 2019-07-09 | End: 2019-07-16 | Stop reason: SDUPTHER

## 2019-07-09 NOTE — PATIENT INSTRUCTIONS
Continue medications  Cardiology Follow up   Pulmonary follow up as scheduled  Medication adherence    Robitussin 2 teaspoons twice daily

## 2019-07-09 NOTE — PROGRESS NOTES
TCM Call (since 6/8/2019)     Date and time call was made  6/27/2019 11:14 AM    Hospital care reviewed  Records reviewed    Patient was hospitialized at  [facility]    Date of Admission  06/23/19    Date of discharge  06/26/19    Diagnosis  Pneumonia due to infectious organism    Disposition  Home    Were the patients medications reviewed and updated  Yes Pt has not taken Robitussin DM since he has been home do to warning on bottle that states patient should not take it they have High BP or Heart Disease  Would like to know if it is safe to take? Please advise  [facility]    Current Symptoms  Cough; Shortness of breath; Fatigue    Cough Severity  Mild Pt still has a mild cough, very slight, not bothersome  jmcma    Shortness of breath severity  Mild Pt has SOB when walking long distances  [facility]    Fatigue severity  Mild Pt is still fatigued and recovering, states he did not get much rest in the hospital  Damari Souza      TCM Call (since 6/8/2019)     Post hospital issues  None    Should patient be enrolled in anticoag monitoring? No    Scheduled for follow up? Yes    Patients specialists  Cardiologist Patient could not get an appointment with Dr Ann Marie De Guzman until 8/16/2019  Has scheduled gregorio with PT and Cardiac Rehab, but needs a clearance letter before can go   Cathi Harley    Did you obtain your prescribed medications  Yes    Do you need help managing your prescriptions or medications  No    Is transportation to your appointment needed  No    I have advised the patient to call PCP with any new or worsening symptoms  Alondra Sequeira [address] or [employer] other    Support System  Spouse    The type of support provided  Emotional; Financial; Physical; Other (comment)    Do you have social support  Yes, as much as I need    Are you recieving any outpatient services  Yes    What type of services  PT and Cardiac Rehab, but needs a clearance letter before can attend   [facility]    Are you recieving home care services  No    Are you using any community resources  No    Current waiver services  No    Have you fallen in the last 12 months  No    Interperter language line needed  No    Counseling  Family wife, Chasidy Phelan topics  Activities of daily living; instructions for management; patient and family education; Importance of RX compliance; Risk factor reduction; Home health agency benefits    Comments  Wife and patient have concerns about the Robitussin DM and the warnings about having High BP and Heart Disease and want to know if it is safe for the patient to take the medication? Other than that all medications were reviewed and pt was advised to continue meds as directed  Is scheduled for Pt and cardiac rehab, but was called today and was told cannot come without a letter of clearance  Advised to rest and f/u as advised   [facility]        Subjective:           Problem List Items Addressed This Visit        Respiratory    COPD (chronic obstructive pulmonary disease) ([facility] [address] )    Sleep apnea    Pneumonia due to infectious organism - Primary       Cardiovascular and Mediastinum    CAD (coronary artery disease) (Chronic)    Relevant Medications    felodipine (PLENDIL) 5 mg 24 hr tablet    clopidogrel (PLAVIX) 75 mg tablet    metoprolol tartrate (LOPRESSOR) 25 mg tablet    Chronic atrial fibrillation (HCC)    Relevant Medications    felodipine (PLENDIL) 5 mg 24 hr tablet    clopidogrel (PLAVIX) 75 mg tablet    metoprolol tartrate (LOPRESSOR) 25 mg tablet    furosemide (LASIX) 40 mg tablet    Diastolic congestive heart failure (HCC)    Relevant Medications    felodipine (PLENDIL) 5 mg 24 hr tablet    clopidogrel (PLAVIX) 75 mg tablet    metoprolol tartrate (LOPRESSOR) 25 mg tablet    Essential hypertension    Relevant Medications    felodipine (PLENDIL) 5 mg 24 hr tablet    metoprolol tartrate (LOPRESSOR) 25 mg tablet    furosemide (LASIX) 40 mg tablet       Other    Status post primary angioplasty with coronary stent (Chronic)    Relevant Medications    metoprolol tartrate (LOPRESSOR) 25 mg tablet    Morbid obesity (HCC)      Other Visit Diagnoses     Hyperuricemia        Relevant Medications    allopurinol (ZYLOPRIM) 100 mg tablet    Coronary artery arteriosclerosis        Relevant Medications    felodipine (PLENDIL) 5 mg 24 hr tablet    clopidogrel (PLAVIX) 75 mg tablet    simvastatin (ZOCOR) 20 mg tablet    metoprolol tartrate (LOPRESSOR) 25 mg tablet    Prostate disorder        Relevant Medications    finasteride (PROSCAR) 5 mg tablet    Need for pneumococcal vaccine        Relevant Orders    PNEUMOCOCCAL CONJUGATE VACCINE 13-VALENT GREATER THAN 6 MONTHS              Orders Placed This Encounter   Procedures    PNEUMOCOCCAL CONJUGATE VACCINE 13-VALENT GREATER THAN 6 MONTHS       Patient Instructions   Continue medications  Cardiology Follow up   Pulmonary follow up as scheduled  Medication adherence  Robitussin 2 teaspoons twice daily    BMI Counseling: Body mass index is 46 1 kg/m²  Discussed the patient's BMI with him  The BMI is above average  BMI counseling and education was provided to the patient  Nutrition recommendations include decreasing overall calorie intake, decreasing soda and/or juice intake and reducing intake of saturated fat and trans fat  Logan Sloan    Chief Complaint   Patient presents with    Transition of Care Management     SLW 6/26/19 Pneumonia due to infectious organism  jmcma     HPI F/u TCM Hospitalized see above COPD / Pneumonia Resp failure MRSA pos   JOVI CKD, CAD    /70   Pulse 70   Temp 98 8 °F (37 1 °C)   Resp 18   Ht 6' 1" (1 854 m)   Wt (!) 158 kg (349 lb 6 4 oz)   SpO2 93%   BMI 46 10 kg/m²       No Known Allergies    Current Outpatient Medications on File Prior to Visit   Medication Sig Dispense Refill    albuterol (2 5 mg/3 mL) 0 083 % nebulizer solution Take 1 vial (2 5 mg total) by nebulization every 6 (six) hours as needed for wheezing or shortness of breath 120 vial 0    albuterol (VENTOLIN HFA) 90 mcg/act inhaler Inhale 2 puffs every 6 (six) hours as needed for wheezing 1 Inhaler 6    ascorbic acid (VITAMIN C) 500 mg tablet Take 500 mg by mouth daily   B Complex Vitamins (B COMPLEX 1 PO) Take 1 tablet by mouth daily   Biotin (BIOTIN 5000) 5 MG CAPS Take 1,000 mcg by mouth daily   calcium carbonate-vitamin D (OSCAL-D) 500 mg-200 units per tablet Take 2 tablets by mouth daily at bedtime   Cranberry 1000 MG CAPS Take 1 tablet by mouth 2 (two) times a day   cyanocobalamin 1000 MCG tablet Take 100 mcg by mouth daily      dextromethorphan-guaiFENesin (ROBITUSSIN DM)  mg/5 mL syrup Take 10 mL by mouth every 4 (four) hours as needed for cough 118 mL 0    Flaxseed, Linseed, (EQL FLAX SEED OIL) 1000 MG CAPS Take by mouth daily   fluticasone-salmeterol (ADVAIR) 250-50 mcg/dose inhaler Inhale 1 puff 2 (two) times a day Rinse mouth after use  1 Inhaler 6    gabapentin (NEURONTIN) 800 mg tablet TAKE 1 TABLET 3 TIMES A  tablet 0    glucosamine-chondroitin 500-400 MG tablet Take 1 tablet by mouth 2 (two) times a day   ipratropium-albuterol (DUO-NEB) 0 5-2 5 mg/3 mL nebulizer solution Take 1 vial (3 mL total) by nebulization every 4 (four) hours as needed for wheezing or shortness of breath  0    ipratropium-albuterol (DUO-NEB) 0 5-2 5 mg/3 mL nebulizer solution Take 1 vial (3 mL total) by nebulization every 6 (six) hours 60 vial 10    lactase (LACTAID) 3,000 units tablet Take 3,000 Units by mouth as needed   Omega-3 Fatty Acids (FISH OIL) 1,000 mg Take 1,000 mg by mouth 2 (two) times a day        PARoxetine (PAXIL) 20 mg tablet TAKE 1/2 TABLET BY MOUTH DAILY 30 tablet 0    psyllium (METAMUCIL) 58 6 % powder Take 1 packet by mouth daily as needed       rivaroxaban (XARELTO) 15 mg tablet Take 1 tablet (15 mg total) by mouth daily with breakfast (Patient taking differently: Take 20 mg by mouth daily with breakfast )  traMADol (ULTRAM) 50 mg tablet Take 50 mg by mouth every 6 (six) hours as needed for moderate pain      [DISCONTINUED] allopurinol (ZYLOPRIM) 100 mg tablet Take 2 tablets (200 mg total) by mouth daily 60 tablet 5    [DISCONTINUED] clopidogrel (PLAVIX) 75 mg tablet Take 1 tablet (75 mg total) by mouth daily 90 tablet 3    [DISCONTINUED] felodipine (PLENDIL) 5 mg 24 hr tablet Take 1 tablet (5 mg total) by mouth daily 90 tablet 3    [DISCONTINUED] finasteride (PROSCAR) 5 mg tablet Take 1 tablet (5 mg total) by mouth daily 90 tablet 3    [DISCONTINUED] furosemide (LASIX) 40 mg tablet Take 40 mg by mouth 2 (two) times a day      [DISCONTINUED] metoprolol tartrate (LOPRESSOR) 25 mg tablet Take 1 tablet (25 mg total) by mouth every 12 (twelve) hours 60 tablet 2    [DISCONTINUED] simvastatin (ZOCOR) 20 mg tablet Take 1 tablet (20 mg total) by mouth daily at bedtime 90 tablet 3     No current facility-administered medications on file prior to visit          Past Medical History:   Diagnosis Date    Arthritis     Atrial flutter ([facility] [address] )     Chronic kidney disease     stage 3    Coronary artery disease     2 stents    Fluid retention     Gout     Heart failure (HCC)     pacemaker    Hypertension     Pacemaker     Pulmonary emphysema ([facility] [address] )     Radiculopathy     last assessed 1/28/16     Shortness of breath     exertion    Sleep apnea     c pap       Past Surgical History:   Procedure Laterality Date    ANGIOPLASTY      x2 2 stents and then replaced    CARDIAC PACEMAKER PLACEMENT      pacemaker permanent placement dual chamber / last assessed 4/7/14 / implantation     CARDIAC SURGERY      pacemaker    CHOLECYSTECTOMY      CORONARY ANGIOPLASTY WITH STENT PLACEMENT      EPIDURAL BLOCK INJECTION N/A 5/26/2016    Procedure: BLOCK / INJECTION EPIDURAL STEROID LUMBAR  L4-5;  Surgeon: Karen Lyle MD;  Location: [place] [address] MAIN OR;  Service:     EPIDURAL BLOCK INJECTION N/A 2/14/2019    Procedure: L4 L5 Lumbar Epidural Steroid Injection;  Surgeon: Meagan Banegas MD;  Location[facility] MAIN OR;  Service: Pain Management     EYE SURGERY      cataract left    KNEE ARTHROSCOPY W/ MENISCAL REPAIR Left     LUMBAR EPIDURAL INJECTION N/A 3/17/2016    Procedure: BLOCK / INJECTION LUMBAR  L4-5  (C-ARM); Surgeon: Meagan Banegas MD;  Location: [facility] MAIN OR;  Service:           reports that he quit smoking about 3 months ago  His smoking use included cigarettes  He has a 50 00 pack-year smoking history  He has never used smokeless tobacco  He reports that he does not drink alcohol or use drugs  reports that he quit smoking about 3 months ago  His smoking use included cigarettes  He has a 50 00 pack-year smoking history  He has never used smokeless tobacco         Review of Systems   Constitutional: Positive for fatigue  Negative for activity change and fever  HENT: Negative for postnasal drip  Respiratory: Positive for cough and shortness of breath  Negative for chest tightness and wheezing  Cardiovascular: Negative for chest pain and leg swelling  Gastrointestinal: Negative for abdominal distention, abdominal pain and diarrhea  Genitourinary: Negative for hematuria  Musculoskeletal: Positive for arthralgias, back pain and gait problem  Without walker   Skin: Positive for rash  Neurological: Negative for seizures, light-headedness and numbness  Hematological: Bruises/bleeds easily  Psychiatric/Behavioral: Positive for sleep disturbance  Negative for behavioral problems and suicidal ideas  The patient is not hyperactive  Physical Exam   Constitutional: He is oriented to person, place, and time  obese   HENT:   Head: Normocephalic  Eyes: Pupils are equal, round, and reactive to light  EOM are normal  No scleral icterus  Cardiovascular: Normal rate and regular rhythm  No murmur heard  Pulmonary/Chest: Effort normal  He has no rales  Rhonchi demetrius clears with cough   Abdominal: Soft  Bowel sounds are normal  There is no rebound  rounded   Musculoskeletal: He exhibits edema and deformity  2 plus pitting  Lymphadenopathy:     He has no cervical adenopathy  Neurological: He is alert and oriented to person, place, and time  Skin: Skin is warm  Capillary refill takes 2 to 3 seconds  Rash noted  He is not diaphoretic  Chronic changes ecchymosis  L LE    Psychiatric: He has a normal mood and affect   His behavior is normal

## 2019-07-09 NOTE — PROGRESS NOTES
Today I meet Shameka Aiken and Danii Alonso at PCP office  Shameka Aiken is walking without assistance, wobble to his gait  Some SOB noted  Shameka Aiken states he is using incentive spirometer daily  Is expectorating gray-yellow mucus, sometimes blood tinged  Informed PCP  No LE edema noted  Bruising both arms  Red flags are discussed along with ability to call PCP 24/7  Daily weights are performed  Wife Danii Alonso states she is to call nephrology specialist with 2 lb daily weight gain  [place] desires release to return to cardiac rehab  Dr Montserrat Holder does write this release and gives to them  We discuss importance of movement as it relates to health  We agree to speak in two weeks

## 2019-07-10 ENCOUNTER — APPOINTMENT (OUTPATIENT)
Dept: CARDIAC REHAB | Facility: CLINIC | Age: 79
End: 2019-07-10
Payer: MEDICARE

## 2019-07-10 ENCOUNTER — APPOINTMENT (OUTPATIENT)
Dept: PHYSICAL THERAPY | Facility: CLINIC | Age: 79
End: 2019-07-10
Payer: MEDICARE

## 2019-07-12 ENCOUNTER — APPOINTMENT (OUTPATIENT)
Dept: CARDIAC REHAB | Facility: CLINIC | Age: 79
End: 2019-07-12
Payer: MEDICARE

## 2019-07-15 ENCOUNTER — EVALUATION (OUTPATIENT)
Dept: PHYSICAL THERAPY | Facility: CLINIC | Age: 79
End: 2019-07-15
Payer: MEDICARE

## 2019-07-15 ENCOUNTER — CLINICAL SUPPORT (OUTPATIENT)
Dept: CARDIAC REHAB | Facility: CLINIC | Age: 79
End: 2019-07-15
Payer: MEDICARE

## 2019-07-15 DIAGNOSIS — Z98.61 S/P PERCUTANEOUS TRANSLUMINAL CORONARY ANGIOPLASTY: ICD-10-CM

## 2019-07-15 DIAGNOSIS — G89.29 CHRONIC BILATERAL LOW BACK PAIN WITHOUT SCIATICA: Primary | ICD-10-CM

## 2019-07-15 DIAGNOSIS — M54.50 CHRONIC BILATERAL LOW BACK PAIN WITHOUT SCIATICA: Primary | ICD-10-CM

## 2019-07-15 PROCEDURE — 97110 THERAPEUTIC EXERCISES: CPT | Performed by: PHYSICAL THERAPIST

## 2019-07-15 PROCEDURE — 93798 PHYS/QHP OP CAR RHAB W/ECG: CPT

## 2019-07-15 NOTE — PROGRESS NOTES
PT Re-Evaluation     Today's date: 7/15/2019  Patient name: Mary Morel  : 1940  MRN: [id number]  Referring provider: Hermes Xiong MD  Dx:   Encounter Diagnosis     ICD-10-CM    1  Chronic bilateral low back pain without sciatica M54 5     G89 29        Start Time: 930  Stop Time: 1020  Total time in clinic (min): 50 minutes    Assessment  Assessment details: Mary Morel is a 66 y o  male who has had a hiatus in therapy due to his cardiopulmonary status and hospitalization due to PNA  At this time, pt has returned to PT and is generally feeling better  Pt LBP complaints are intermittent and he is pleased with pain free days however pain ongoing which has been exacerbated since most recent hospitalization  At this time, pt R hip pain which appears to be referred from low back which is brief and intermittent in nature  He cont to amb with RW due to cardiovascular deficits which are improving via means of cardiac rehabilitation  At this time pt remains below his PLOF and while extension based exercise continues to offer relief, his functional activity tolerance remains poor, however again, cont to improve  Pt would benefit from ongoing skilled OPPT to maximize return to PLOF       Impairments: abnormal gait, abnormal or restricted ROM, activity intolerance, impaired physical strength, lacks appropriate home exercise program, pain with function, poor posture  and poor body mechanics  Understanding of Dx/Px/POC: good   Prognosis: good    Goals  Short term goals to be accomplished in 3 weeks: -paertially met/ongoing  STG 1: Pt will demo independence with postural management  STG 2: Pt will demo I with HEP to maximize progress between therapy sessions  STG 3: Pt will demo L/S AROM < or = min loss throughout to promote improved functional mobility and body mechanics  STG 4: Pt will demo 1/2 MMT grade core stabilizers to improve lumbar stability with functional challenges  STG 5: Pt will reports pain dec freq and intensity 50%    Long term goals to be accomplished in 6 weeks: - ongoing  LTG 1: Pt will demo good body mech with >75% functional challenges to prevent reinjury  LTG 2: Pt will be able to return to walking without AD in community pain free as per PLOF  LTG 3: Pt will demo Good strength core stabilizers to promote carryover with body mech and posture    Plan  Plan details: Cont HEP development, stretching, strengthening, A/AA/PROM, joint mobilizations, posture education, STM/MI as needed to reduce muscle tension, muscle reeducation, PLOC discussed and agreed upon with patient  Patient would benefit from: PT eval and skilled physical therapy  Planned modality interventions: cryotherapy and thermotherapy: hydrocollator packs  Planned therapy interventions: manual therapy, neuromuscular re-education, self care, therapeutic activities, therapeutic exercise and home exercise program  Frequency: 2x week  Duration in weeks: 6  Treatment plan discussed with: patient        Subjective Evaluation    History of Present Illness  Mechanism of injury: Pt experienced a cardiac episode several months ago to which he was hospitalized and surgically placed 2 stents and was in subacute care for 2 weeks  He has been receiving home PT since his DC form the facility over last several weeks  Since his DC from hospital he has been experiencing low back pina, right sided > left side  He notices pain while walking with RW due to "being bent over " He is noticing he is below his PLOF at this time  He is hopeful to gain strength and minimize his pain to perform car xfer  Pt notices that his pain is primarily noticed when transitioning from sitting to standing and is worse when he "sits wrong " Pt feels that he generally has B LE discomfort and knee "issues " Pt is not taking pain medication at this time and is performing HEP from home PT and subacute facility daily   He is also walking with a RW which he is hopeful to no longer require  He does acknowledges he looses his balance "here and there" due to "not paying attention " He reports history of knee buckling  At time of Re-EVAL 7/15/19 Pt reports he only rarely gets LBP and is now, post hospitalization noticing R sided LBP and R hip pain which is brief in nature and while intense and sharp does not last  Pt feels he is capitalizing well on his HEP  Pt also reports mobility deficits are related primarily related, not to the back, but to his unrelated health issues  He cont to perform cardiac rehab regularly  Pt does feel he is below his PLOF primarily with lifting related tasks for groceries and household duties  Quality of life: fair    Pain  Current pain ratin  At best pain ratin  At worst pain ratin          Objective     Concurrent Complaints  Negative for night pain, disturbed sleep, bladder dysfunction, bowel dysfunction and saddle (S4) numbness    Postural Observations  Seated posture: fair  Standing posture: fair        Active Range of Motion     Lumbar   Flexion:  Restriction level: moderate  Extension:  Restriction level: minimal  Left lateral flexion:  Restriction level: minimal  Right lateral flexion:  Restriction level: minimal    Strength/Myotome Testing     Additional Strength Details  B LE strength 4/5 MMT  TrvA Fair(+)    Tests     Additional Tests Details  DEANNA: Dec pain      General Comments:      Lumbar Comments  Function:    Gait: Progressing toward dec AD with household distances, supervision for community distance amb without RW  Xfers: Pain free      Flowsheet Rows      Most Recent Value   PT/OT G-Codes   Current Score  0   Projected Score  0   FOTO information reviewed  Yes          Precautions: Standard  Fall risk  SOB  Blood thinners        Daily Treatment Diary     Manual  6/3 6/10 6/12 6/17 6/20 7/15                                                                            Exercise Diary  1 2 3 4 5 6       Posture Rev 10' Rev rev rev rev rev       DEANNA 10x 2x10 2x10 2x10 2x10 10x       TrvA  Seated 3x10 3x10 3x10 3x10        TrvA  Push down standing 3x10 3x10   4x10       Ball sq  Seated 4x10           Hip abd  4x10  Standing 3x10         TrvA March stand   3x10 ea and sit 4lbs 3x10 3x10 10x       TrvA LAQ    4lbs 3x10 3x10 3x10       Hamstring curls     4lbs 3x10        Mini sq     2x10 STS 10x                                                                                                               HEP Edu/initiated Rev rev rev rev rev       Time 10' 40' 40' 40' 45' 50'           Modalities

## 2019-07-16 DIAGNOSIS — Z79.2 PROPHYLACTIC ANTIBIOTIC: Primary | ICD-10-CM

## 2019-07-16 DIAGNOSIS — I25.10 CORONARY ARTERY ARTERIOSCLEROSIS: ICD-10-CM

## 2019-07-16 DIAGNOSIS — I10 ESSENTIAL HYPERTENSION: ICD-10-CM

## 2019-07-16 DIAGNOSIS — N42.9 PROSTATE DISORDER: ICD-10-CM

## 2019-07-16 RX ORDER — FELODIPINE 5 MG/1
5 TABLET, EXTENDED RELEASE ORAL DAILY
Qty: 90 TABLET | Refills: 3 | Status: SHIPPED | OUTPATIENT
Start: 2019-07-16 | End: 2020-03-19 | Stop reason: SDUPTHER

## 2019-07-16 RX ORDER — SIMVASTATIN 20 MG
20 TABLET ORAL
Qty: 90 TABLET | Refills: 3 | Status: SHIPPED | OUTPATIENT
Start: 2019-07-16 | End: 2020-03-19 | Stop reason: SDUPTHER

## 2019-07-16 RX ORDER — FINASTERIDE 5 MG/1
5 TABLET, FILM COATED ORAL DAILY
Qty: 90 TABLET | Refills: 3 | Status: SHIPPED | OUTPATIENT
Start: 2019-07-16 | End: 2020-01-27 | Stop reason: SDUPTHER

## 2019-07-16 RX ORDER — CLOPIDOGREL BISULFATE 75 MG/1
75 TABLET ORAL DAILY
Qty: 90 TABLET | Refills: 3 | Status: SHIPPED | OUTPATIENT
Start: 2019-07-16 | End: 2020-03-19 | Stop reason: SDUPTHER

## 2019-07-16 RX ORDER — AMOXICILLIN 500 MG/1
CAPSULE ORAL
Qty: 20 CAPSULE | Refills: 2 | Status: SHIPPED | OUTPATIENT
Start: 2019-07-16 | End: 2020-11-05 | Stop reason: SDUPTHER

## 2019-07-16 NOTE — TELEPHONE ENCOUNTER
Patient is going to the dentist on Thursday and needs a prophylactic rx of amox called into the [facility]  Wife would like to know if you could call in about 12 pills so that it can get them through 3 visits? Please advise    Macrina Ibanez MA

## 2019-07-16 NOTE — TELEPHONE ENCOUNTER
These accidentally went to [facility] and they needed to go to the mail order  Please resend    Waqar Kruger MA

## 2019-07-17 ENCOUNTER — OFFICE VISIT (OUTPATIENT)
Dept: PULMONOLOGY | Facility: MEDICAL CENTER | Age: 79
End: 2019-07-17
Payer: MEDICARE

## 2019-07-17 ENCOUNTER — CLINICAL SUPPORT (OUTPATIENT)
Dept: CARDIAC REHAB | Facility: CLINIC | Age: 79
End: 2019-07-17
Payer: MEDICARE

## 2019-07-17 ENCOUNTER — OFFICE VISIT (OUTPATIENT)
Dept: PHYSICAL THERAPY | Facility: CLINIC | Age: 79
End: 2019-07-17
Payer: MEDICARE

## 2019-07-17 VITALS
WEIGHT: 315 LBS | TEMPERATURE: 98.7 F | BODY MASS INDEX: 41.75 KG/M2 | SYSTOLIC BLOOD PRESSURE: 118 MMHG | HEIGHT: 73 IN | DIASTOLIC BLOOD PRESSURE: 58 MMHG | HEART RATE: 70 BPM | RESPIRATION RATE: 12 BRPM | OXYGEN SATURATION: 94 %

## 2019-07-17 DIAGNOSIS — M54.50 CHRONIC BILATERAL LOW BACK PAIN WITHOUT SCIATICA: Primary | ICD-10-CM

## 2019-07-17 DIAGNOSIS — J18.9 PNEUMONIA OF RIGHT LOWER LOBE DUE TO INFECTIOUS ORGANISM: Primary | ICD-10-CM

## 2019-07-17 DIAGNOSIS — Z95.5 STATUS POST PRIMARY ANGIOPLASTY WITH CORONARY STENT: Chronic | ICD-10-CM

## 2019-07-17 DIAGNOSIS — G47.33 OSA (OBSTRUCTIVE SLEEP APNEA): ICD-10-CM

## 2019-07-17 DIAGNOSIS — J41.8 MIXED SIMPLE AND MUCOPURULENT CHRONIC BRONCHITIS (HCC): ICD-10-CM

## 2019-07-17 DIAGNOSIS — G89.29 CHRONIC BILATERAL LOW BACK PAIN WITHOUT SCIATICA: Primary | ICD-10-CM

## 2019-07-17 PROCEDURE — 97110 THERAPEUTIC EXERCISES: CPT | Performed by: PHYSICAL THERAPIST

## 2019-07-17 PROCEDURE — 93798 PHYS/QHP OP CAR RHAB W/ECG: CPT

## 2019-07-17 PROCEDURE — 99214 OFFICE O/P EST MOD 30 MIN: CPT | Performed by: NURSE PRACTITIONER

## 2019-07-17 RX ORDER — FLUTICASONE FUROATE AND VILANTEROL 100; 25 UG/1; UG/1
1 POWDER RESPIRATORY (INHALATION) DAILY
Qty: 3 INHALER | Refills: 3 | Status: SHIPPED | OUTPATIENT
Start: 2019-07-17 | End: 2020-09-29 | Stop reason: SDUPTHER

## 2019-07-17 RX ORDER — IPRATROPIUM BROMIDE AND ALBUTEROL SULFATE 2.5; .5 MG/3ML; MG/3ML
3 SOLUTION RESPIRATORY (INHALATION) 3 TIMES DAILY
Qty: 270 ML | Refills: 1 | Status: SHIPPED | OUTPATIENT
Start: 2019-07-17 | End: 2019-09-19 | Stop reason: SDUPTHER

## 2019-07-17 NOTE — PROGRESS NOTES
Daily Note     Today's date: 2019  Patient name: Juan Manuel Glass  : 1940  MRN: [id number]  Referring provider: Aaron Carlson MD  Dx:   Encounter Diagnosis     ICD-10-CM    1  Chronic bilateral low back pain without sciatica M54 5     G89 29        Start Time: 930  Stop Time: 1015  Total time in clinic (min): 45 minutes    Subjective: Pt reports his LBP continues to wane  He is continuing to dec amb with RW I his home and for short community distances is parking spot to doctor office  He is continuing with cardiac rehab  Objective: See treatment diary below    Precautions: Standard  Fall risk  SOB  Blood thinners  Daily Treatment Diary     Manual  6/3 6/10 6/12 6/17 6/20 7/15 7/17                                                                           Exercise Diary  1 2 3 4 5 6 7      Posture Rev 10' Rev rev rev rev rev rev      DEANNA 10x 2x10 2x10 2x10 2x10 10x 10x3      TrvA  Seated 3x10 3x10 3x10 3x10        TrvA  Push down standing 3x10 3x10   4x10       Ball sq  Seated 4x10           Hip abd  4x10  Standing 3x10   2x10      TrvA March stand   3x10 ea and sit 4lbs 3x10 3x10 10x       TrvA LAQ    4lbs 3x10 3x10 3x10       Hamstring curls     4lbs 3x10        Mini sq     2x10 STS 10x 15x                                                                                                              HEP Edu/initiated Rev rev rev rev rev rev      Time 10' 40' 36' 40' 45' 50' 40'          Modalities                                                           Assessment: Tolerated treatment well  Patient demo imporviing ambulatory status and good general response to POC  Plan: Continue per plan of care

## 2019-07-17 NOTE — ASSESSMENT & PLAN NOTE
Patient likely has chronic bronchitis  Pulmonary function test that was done May 8, 2019 showed equivocal results  Forced vital capacity was 2 71 L or 58% of predicted, FEV1 was 2 03 L 61% obstruction ratio 75 flow volume loop showed moderately severe restrictive impairment  He currently is on Advair 250/51 puff b i d  He also has a nebulizer at home with duo nebs    I am encouraging to uses at least twice daily

## 2019-07-17 NOTE — ASSESSMENT & PLAN NOTE
Patient had a sleep study in 2011  His apneic hypopnea index was 42 4  He spent 111 minutes below 90% oxygen saturation  Was also noticed cardiac wise that he had PVCs  Patient was titrated in 2002  Apparently had a diagnosis of obstructive sleep apnea at least in the year 2002  At that time he was titrated to CPAP at 18 cm of water pressure  According to patient his last diagnostic test was in 2011 he had titration study   [facility] at [facility]   I am going to request that he have a titration study  I feel that his current pressure could be incorrect  It I do not have availability to compliance data patient is agreeable to the same

## 2019-07-17 NOTE — PATIENT INSTRUCTIONS
Sleep Apnea   AMBULATORY CARE:   Sleep apnea  is also called obstructive sleep apnea  It is a condition that causes you to stop breathing for 10 seconds or more while you are sleeping  During normal sleep, your throat is kept open by muscles that let air pass through easily  Sleep apnea causes the muscles and tissues around your throat to relax and block air from passing through  Sleep apnea may happen many times while you are asleep  Common symptoms include the following:   · No signs of breathing for 10 seconds or more while you sleep    · Snoring loudly, snorting, gasping or choking while you sleep, and waking up suddenly because of these    · A hard time thinking, remembering things, or focusing on your tasks the following day    · Headache or nausea    · Bedwetting or waking up often during the night to urinate    · Feeling sleepy, slow, and tired during the day  Seek care immediately if:   · You have chest pain or trouble breathing  Contact your healthcare provider if:   · You feel tired or depressed  · You have trouble staying awake during the day  · You have trouble thinking clearly  · You have questions or concerns about your condition or care  Treatment for sleep apnea  includes using a continuous positive airway pressure (CPAP) machine to keep your airway open during sleep  A mask is placed over your nose and mouth, or just your nose  The mask is hooked to the CPAP machine that blows a gentle stream of air into the mask when you breathe  This helps keep your airway open so you can breathe more regularly  Extra oxygen may be given to you through the machine  You may be given a mouth device  It looks like a mouth guard or dental retainer and stops your tongue and mouth tissues from blocking your throat while you sleep  Surgery may be needed to remove extra tissues that block your mouth, throat, or nose  Manage sleep apnea:   · Do not smoke    Nicotine and other chemicals in cigarettes and cigars can cause lung damage  Ask your healthcare provider for information if you currently smoke and need help to quit  E-cigarettes or smokeless tobacco still contain nicotine  Talk to your healthcare provider before you use these products  · Do not drink alcohol or take sedative medicine before you go to sleep  Alcohol and sedatives can relax the muscles and tissues around your throat  This can block the airflow to your lungs  · Maintain a healthy weight  Excess tissue around your throat may restrict your breathing  Ask your healthcare provider for information if you need to lose weight  · Sleep on your side or use pillows designed to prevent sleep apnea  This prevents your tongue or other tissues from blocking your throat  You can also raise the head of your bed  Follow up with your healthcare provider as directed:  Write down your questions so you remember to ask them during your visits  © 2017 [address] [employer] Information is for End User's use only and may not be sold, redistributed or otherwise used for commercial purposes  All illustrations and images included in CareNotes® are the copyrighted property of A [employer] M [employer]  or Oseas Santos  The above information is an  only  It is not intended as medical advice for individual conditions or treatments  Talk to your doctor, nurse or pharmacist before following any medical regimen to see if it is safe and effective for you  I am ordering a CAT scan a year chest to be done without IV contrast   Should be done any time after the week  Of August 26  I have also ordered a new inhaler for you called Breo  The will take the place of year Advair  Take 1 puff a day and use her Duo nebs twice daily or 3 times a day  Follow-up will be after results of year sleep study    Otherwise I will see you in 6 months

## 2019-07-17 NOTE — ASSESSMENT & PLAN NOTE
Patient was recently hospitalized in June of 2019 for right lower lung pneumonia  Apparently he had a previous admission in April 2019  His last chest x-ray was done June 23, 2019  A airspace consolidation the rhonchi right lung base was noted  He was treated and completed both IV and oral antibiotic  He is here today for follow-up  Currently there are no clinical signs of pneumonia  He does have a history of being a long-time smoker  He quit in April 2019  I have ordered a CAT scan without IV contrast   He does have a history of chronic renal disease  He will have this the week of August 26, 2019  Patient and his wife are both agreeable to this plan  His his daughter also accompanies him today

## 2019-07-18 ENCOUNTER — HOSPITAL ENCOUNTER (OUTPATIENT)
Dept: SLEEP CENTER | Facility: CLINIC | Age: 79
Discharge: HOME | End: 2019-07-18
Payer: MEDICARE

## 2019-07-18 DIAGNOSIS — G47.33 OSA (OBSTRUCTIVE SLEEP APNEA): ICD-10-CM

## 2019-07-18 PROCEDURE — 95811 POLYSOM 6/>YRS CPAP 4/> PARM: CPT

## 2019-07-18 PROCEDURE — 95811 POLYSOM 6/>YRS CPAP 4/> PARM: CPT | Performed by: INTERNAL MEDICINE

## 2019-07-19 ENCOUNTER — CLINICAL SUPPORT (OUTPATIENT)
Dept: CARDIAC REHAB | Facility: CLINIC | Age: 79
End: 2019-07-19
Payer: MEDICARE

## 2019-07-19 DIAGNOSIS — Z98.61 S/P PERCUTANEOUS TRANSLUMINAL CORONARY ANGIOPLASTY: ICD-10-CM

## 2019-07-19 DIAGNOSIS — G47.33 OSA (OBSTRUCTIVE SLEEP APNEA): Primary | ICD-10-CM

## 2019-07-19 PROCEDURE — 93798 PHYS/QHP OP CAR RHAB W/ECG: CPT

## 2019-07-19 NOTE — PROGRESS NOTES
Sleep Study Documentation    Pre-Sleep Study       Sleep testing procedure explained to patient:YES    Patient napped prior to study:YES- less than 30 minutes  Napped after 2PM: no    Caffeine:Dayshift worker after 12PM   Caffeine use:NO    Alcohol:Dayshift workers after 5PM: Alcohol use:NO    Typical day for patient:YES       Study Documentation    Sleep Study Indications: JOVI, Retitration to check pressure    Sleep Study: Treatment   Optimal PAP pressure: 19  Leak:None  Snore:Eliminated  REM Obtained:yes  Supplemental O2: no    Minimum SaO2 78  Baseline SaO2 91  PAP mask tried (list all)N20, F20  PAP mask choice (final)F20  PAP mask type:full face  PAP pressure at which snoring was eliminated 10  Minimum SaO2 at final PAP pressure 89  Mode of Therapy:CPAP  ETCO2:No  CPAP changed to BiPAP:No    Mode of Therapy:CPAP    EKG abnormalities: yes:  EPOCH example and comments: Paced with PVC's    EEG abnormalities: no    Sleep Study Recorded < 2 hours: N/A    Sleep Study Recorded > 2 hours but incomplete study: N/A    Sleep Study Recorded 6 hours but no sleep obtained: NO    Patient classification: retired       Post-Sleep Study    Medication used at bedtime or during sleep study:NO    Patient reports time it took to fall asleep:20 to 30 minutes    Patient reports waking up during study:1 to 2 times  Patient reports returning to sleep without difficulty  Patient reports sleeping 4 to 6 hours without dreaming  Patient reports sleep during study:Worse    Patient rated sleepiness: Somewhat sleepy or tired    PAP treatment:yes: Post PAP treatment patient reports feeling worse and  would wear PAP mask at home  Patient stated he felt like he did not sleep until the latter part of the study

## 2019-07-22 ENCOUNTER — OFFICE VISIT (OUTPATIENT)
Dept: FAMILY MEDICINE CLINIC | Facility: CLINIC | Age: 79
End: 2019-07-22
Payer: MEDICARE

## 2019-07-22 ENCOUNTER — TELEPHONE (OUTPATIENT)
Dept: PULMONOLOGY | Facility: MEDICAL CENTER | Age: 79
End: 2019-07-22

## 2019-07-22 ENCOUNTER — APPOINTMENT (OUTPATIENT)
Dept: CARDIAC REHAB | Facility: CLINIC | Age: 79
End: 2019-07-22
Payer: MEDICARE

## 2019-07-22 ENCOUNTER — APPOINTMENT (OUTPATIENT)
Dept: PHYSICAL THERAPY | Facility: CLINIC | Age: 79
End: 2019-07-22
Payer: MEDICARE

## 2019-07-22 VITALS
RESPIRATION RATE: 20 BRPM | BODY MASS INDEX: 41.75 KG/M2 | TEMPERATURE: 99.5 F | HEIGHT: 73 IN | SYSTOLIC BLOOD PRESSURE: 132 MMHG | HEART RATE: 71 BPM | DIASTOLIC BLOOD PRESSURE: 60 MMHG | OXYGEN SATURATION: 90 % | WEIGHT: 315 LBS

## 2019-07-22 DIAGNOSIS — J44.9 CHRONIC OBSTRUCTIVE PULMONARY DISEASE, UNSPECIFIED COPD TYPE (HCC): ICD-10-CM

## 2019-07-22 DIAGNOSIS — J44.0 CHRONIC OBSTRUCTIVE PULMONARY DISEASE WITH ACUTE LOWER RESPIRATORY INFECTION (HCC): ICD-10-CM

## 2019-07-22 DIAGNOSIS — E79.0 HYPERURICEMIA: ICD-10-CM

## 2019-07-22 DIAGNOSIS — N18.30 CKD (CHRONIC KIDNEY DISEASE) STAGE 3, GFR 30-59 ML/MIN (HCC): Primary | ICD-10-CM

## 2019-07-22 DIAGNOSIS — M1A.9XX0 CHRONIC GOUT WITHOUT TOPHUS, UNSPECIFIED CAUSE, UNSPECIFIED SITE: Primary | ICD-10-CM

## 2019-07-22 DIAGNOSIS — J40 BRONCHITIS: ICD-10-CM

## 2019-07-22 DIAGNOSIS — M25.531 WRIST PAIN, ACUTE, RIGHT: ICD-10-CM

## 2019-07-22 PROCEDURE — 99214 OFFICE O/P EST MOD 30 MIN: CPT | Performed by: FAMILY MEDICINE

## 2019-07-22 RX ORDER — CEFPODOXIME PROXETIL 200 MG/1
200 TABLET, FILM COATED ORAL 2 TIMES DAILY
Qty: 10 TABLET | Refills: 0 | Status: SHIPPED | OUTPATIENT
Start: 2019-07-22 | End: 2019-07-27

## 2019-07-22 RX ORDER — COLCHICINE 0.6 MG/1
0.6 TABLET ORAL 2 TIMES DAILY
Qty: 30 TABLET | Refills: 5 | Status: SHIPPED | OUTPATIENT
Start: 2019-07-22 | End: 2022-02-07 | Stop reason: HOSPADM

## 2019-07-22 RX ORDER — METHYLPREDNISOLONE 4 MG/1
TABLET ORAL
Qty: 21 EACH | Refills: 0 | Status: SHIPPED | OUTPATIENT
Start: 2019-07-22 | End: 2019-08-27

## 2019-07-22 RX ORDER — ALBUTEROL SULFATE 90 UG/1
2 AEROSOL, METERED RESPIRATORY (INHALATION) EVERY 6 HOURS PRN
Qty: 1 INHALER | Refills: 6 | Status: SHIPPED | OUTPATIENT
Start: 2019-07-22 | End: 2020-09-29 | Stop reason: SDUPTHER

## 2019-07-22 NOTE — PROGRESS NOTES
Am and 1 PM    Subjective:           Problem List Items Addressed This Visit        Respiratory    COPD (chronic obstructive pulmonary disease) ([facility])    Relevant Medications    albuterol (VENTOLIN HFA) 90 mcg/act inhaler    methylPREDNISolone 4 MG tablet therapy pack       Genitourinary    CKD (chronic kidney disease) stage 3, GFR 30-59 ml/min ([facility]) - Primary       Other    Wrist pain, acute, right    Relevant Medications    methylPREDNISolone 4 MG tablet therapy pack    Hyperuricemia      Other Visit Diagnoses     Bronchitis        Relevant Medications    albuterol (VENTOLIN HFA) 90 mcg/act inhaler    cefpodoxime (VANTIN) 200 mg tablet              No orders of the defined types were placed in this encounter  Patient Instructions   Nebulizer 3x daily as needed  Albuterol if SOB  Take 2 lasix 40mg tabs am and 1 pm x 3 days  Follow Pulmonary, Cardiology as scheduled  CT scan as ordered     chest x-ray wednesday    BMI Counseling: Body mass index is 46 39 kg/m²  Discussed the patient's BMI with him  The BMI is above average  BMI counseling and education was provided to the patient  Nutrition recommendations include decreasing overall calorie intake, 3-5 servings of fruits/vegetables daily, reducing fast food intake, increasing intake of lean protein and reducing intake of saturated fat and trans fat  Tracy Baltazar    Chief Complaint   Patient presents with    right hand swelling     jmcma     HPI  Most is in for evaluation swelling R wrist area pain and stiffness extending to the finger with tightness  History of hyperuricemia gout in the past last uric acid 8 1  He denies trauma  Cough PRETTY 2 lb wgt gain    /60   Pulse 71   Temp 99 5 °F (37 5 °C)   Resp 20   Ht 6' 1" (1 854 m)   Wt (!) 159 kg (351 lb 9 6 oz)   SpO2 90%   BMI 46 39 kg/m²       No Known Allergies    Current Outpatient Medications on File Prior to Visit   Medication Sig Dispense Refill    albuterol (2 5 mg/3 mL) 0 083 % nebulizer solution Take 1 vial (2 5 mg total) by nebulization every 6 (six) hours as needed for wheezing or shortness of breath 120 vial 0    allopurinol (ZYLOPRIM) 100 mg tablet Take 2 tablets (200 mg total) by mouth daily 60 tablet 5    ascorbic acid (VITAMIN C) 500 mg tablet Take 500 mg by mouth daily   B Complex Vitamins (B COMPLEX 1 PO) Take 1 tablet by mouth daily   Biotin (BIOTIN 5000) 5 MG CAPS Take 1,000 mcg by mouth daily   calcium carbonate-vitamin D (OSCAL-D) 500 mg-200 units per tablet Take 2 tablets by mouth daily at bedtime   clopidogrel (PLAVIX) 75 mg tablet Take 1 tablet (75 mg total) by mouth daily 90 tablet 3    Cranberry 1000 MG CAPS Take 1 tablet by mouth 2 (two) times a day   cyanocobalamin 1000 MCG tablet Take 100 mcg by mouth daily      dextromethorphan-guaiFENesin (ROBITUSSIN DM)  mg/5 mL syrup Take 10 mL by mouth every 4 (four) hours as needed for cough 118 mL 0    felodipine (PLENDIL) 5 mg 24 hr tablet Take 1 tablet (5 mg total) by mouth daily 90 tablet 3    finasteride (PROSCAR) 5 mg tablet Take 1 tablet (5 mg total) by mouth daily 90 tablet 3    Flaxseed, Linseed, (EQL FLAX SEED OIL) 1000 MG CAPS Take by mouth daily   fluticasone-salmeterol (ADVAIR) 250-50 mcg/dose inhaler Inhale 1 puff 2 (two) times a day Rinse mouth after use  1 Inhaler 6    fluticasone-vilanterol (BREO ELLIPTA) 100-25 mcg/inh inhaler Inhale 1 puff daily Rinse mouth after use  3 Inhaler 3    furosemide (LASIX) 40 mg tablet Take 1 tablet (40 mg total) by mouth 2 (two) times a day 180 tablet 3    gabapentin (NEURONTIN) 800 mg tablet TAKE 1 TABLET 3 TIMES A  tablet 0    glucosamine-chondroitin 500-400 MG tablet Take 1 tablet by mouth 2 (two) times a day        ipratropium-albuterol (DUO-NEB) 0 5-2 5 mg/3 mL nebulizer solution Take 1 vial (3 mL total) by nebulization every 4 (four) hours as needed for wheezing or shortness of breath  0    ipratropium-albuterol (DUO-NEB) 0 5-2 5 mg/3 mL nebulizer solution Take 1 vial (3 mL total) by nebulization every 6 (six) hours 60 vial 10    ipratropium-albuterol (DUO-NEB) 0 5-2 5 mg/3 mL nebulizer solution Take 1 vial (3 mL total) by nebulization 3 (three) times a day 270 mL 1    lactase (LACTAID) 3,000 units tablet Take 3,000 Units by mouth as needed   metoprolol tartrate (LOPRESSOR) 25 mg tablet Take 1 tablet (25 mg total) by mouth every 12 (twelve) hours 60 tablet 10    Omega-3 Fatty Acids (FISH OIL) 1,000 mg Take 1,000 mg by mouth 2 (two) times a day   PARoxetine (PAXIL) 20 mg tablet TAKE 1/2 TABLET BY MOUTH DAILY 30 tablet 0    psyllium (METAMUCIL) 58 6 % powder Take 1 packet by mouth daily as needed       rivaroxaban (XARELTO) 15 mg tablet Take 1 tablet (15 mg total) by mouth daily with breakfast (Patient taking differently: Take 20 mg by mouth daily with breakfast )      simvastatin (ZOCOR) 20 mg tablet Take 1 tablet (20 mg total) by mouth daily at bedtime 90 tablet 3    traMADol (ULTRAM) 50 mg tablet Take 50 mg by mouth every 6 (six) hours as needed for moderate pain      [DISCONTINUED] albuterol (VENTOLIN HFA) 90 mcg/act inhaler Inhale 2 puffs every 6 (six) hours as needed for wheezing 1 Inhaler 6    amoxicillin (AMOXIL) 500 mg capsule 4 capsules 1 hour prior to Dental procedures (Patient not taking: Reported on 7/17/2019) 20 capsule 2     No current facility-administered medications on file prior to visit          Past Medical History:   Diagnosis Date    Arthritis     [facility] fl[facility] [address]     Chronic kidney disease     stage 3    Coronary artery disease     2 stents    Fluid retention     Gout     Heart failure ([facility] [address] [facility]     pacemaker    Hypertension     Pacemaker     Pulmonary emphysema ([facility] [address] [facility]     Radiculopathy     last assessed 1/28/16     Shortness of breath     exertion    Sleep apnea     c pap       Past Surgical History:   Procedure Laterality Date    ANGIOPLASTY      x2 2 stents and then replaced   [address]      pacemaker permanent placement dual chamber / last assessed 4/7/14 / implantation     CARDIAC SURGERY      pacemaker    CHOLECYSTECTOMY      CORONARY ANGIOPLASTY WITH STENT PLACEMENT      EPIDURAL BLOCK INJECTION N/A 5/26/2016    Procedure: BLOCK / INJECTION EPIDURAL STEROID LUMBAR  L4-5;  Surgeon: Favian Eubanks MD;  [facility] MAIN OR;  Service:     EPIDURAL BLOCK INJECTION N/A 2/14/2019    Procedure: L4 L5 Lumbar Epidural Steroid Injection;  Surgeon: Favian Eubanks MD;  Location: [facility] MAIN OR;  Service: Pain Management     EYE SURGERY      cataract left    KNEE ARTHROSCOPY W/ MENISCAL REPAIR Left     LUMBAR EPIDURAL INJECTION N/A 3/17/2016    Procedure: BLOCK / INJECTION LUMBAR  L4-5  (C-ARM); Surgeon: Favian Eubanks MD;  Location: [facility] MAIN OR;  Service:           reports that he quit smoking about 3 months ago  His smoking use included cigarettes  He has a 50 00 pack-year smoking history  He has never used smokeless tobacco  He reports that he does not drink alcohol or use drugs  reports that he quit smoking about 3 months ago  His smoking use included cigarettes  He has a 50 00 pack-year smoking history  He has never used smokeless tobacco         Review of Systems   Constitutional: Negative for activity change, fatigue and fever  HENT: Negative for postnasal drip  Respiratory: Positive for cough and shortness of breath  Negative for chest tightness and wheezing  Cardiovascular: Negative for chest pain and leg swelling  Gastrointestinal: Negative for abdominal distention, abdominal pain and diarrhea  Genitourinary: Negative for hematuria  Musculoskeletal: Positive for arthralgias, back pain and gait problem  Without walker       R wrist swelling stiff    Skin: Positive for rash  Neurological: Negative for seizures, light-headedness and numbness  Hematological: Bruises/bleeds easily     Psychiatric/Behavioral: Positive for sleep disturbance  Negative for behavioral problems and suicidal ideas  The patient is not hyperactive  Physical Exam   Constitutional: He is oriented to person, place, and time  obese   HENT:   Head: Normocephalic  Eyes: Pupils are equal, round, and reactive to light  EOM are normal  No scleral icterus  Cardiovascular: Normal rate and regular rhythm  No murmur heard  Pulmonary/Chest: Effort normal  He has wheezes  He has no rales  Rhonchi R base   Abdominal: Soft  Bowel sounds are normal  There is no rebound  rounded   Musculoskeletal: He exhibits edema, tenderness and deformity  2 plus pitting  R wrist fullness  Edema extending inti fingers   Lymphadenopathy:     He has no cervical adenopathy  Neurological: He is alert and oriented to person, place, and time  Skin: Skin is warm  Capillary refill takes 2 to 3 seconds  Rash noted  He is not diaphoretic  There is erythema  Chronic changes ecchymosis  L LE     R index red swelling PIP tender  Psychiatric: He has a normal mood and affect   His behavior is normal

## 2019-07-22 NOTE — PATIENT INSTRUCTIONS
Nebulizer 3x daily as needed  Albuterol if SOB  Take 2 lasix 40mg tabs am and 1 pm x 3 days  Follow Pulmonary, Cardiology as scheduled   CT scan as ordered     chest x-ray wednesday

## 2019-07-22 NOTE — PROGRESS NOTES
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    Relevant Medications    colchicine (COLCRYS) 0 6 mg tablet              No orders of the defined types were placed in this encounter  There are no Patient Instructions on file for this visit  BMI Counseling: There is no height or weight on file to calculate BMI  Discussed the patient's BMI with him  The BMI is above average  BMI counseling and education was provided to the patient  Nutrition recommendations include reducing portion sizes, consuming healthier snacks and increasing intake of lean protein  Areta Lo    No chief complaint on file  HPI    There were no vitals taken for this visit  No Known Allergies    Current Outpatient Medications on File Prior to Visit   Medication Sig Dispense Refill    albuterol (2 5 mg/3 mL) 0 083 % nebulizer solution Take 1 vial (2 5 mg total) by nebulization every 6 (six) hours as needed for wheezing or shortness of breath 120 vial 0    albuterol (VENTOLIN HFA) 90 mcg/act inhaler Inhale 2 puffs every 6 (six) hours as needed for wheezing 1 Inhaler 6    allopurinol (ZYLOPRIM) 100 mg tablet Take 2 tablets (200 mg total) by mouth daily 60 tablet 5    amoxicillin (AMOXIL) 500 mg capsule 4 capsules 1 hour prior to Dental procedures (Patient not taking: Reported on 7/17/2019) 20 capsule 2    ascorbic acid (VITAMIN C) 500 mg tablet Take 500 mg by mouth daily   B Complex Vitamins (B COMPLEX 1 PO) Take 1 tablet by mouth daily   Biotin (BIOTIN 5000) 5 MG CAPS Take 1,000 mcg by mouth daily   calcium carbonate-vitamin D (OSCAL-D) 500 mg-200 units per tablet Take 2 tablets by mouth daily at bedtime        cefpodoxime (VANTIN) 200 mg tablet Take 1 tablet (200 mg total) by mouth 2 (two) times a day for 5 days 10 tablet 0    clopidogrel (PLAVIX) 75 mg tablet Take 1 tablet (75 mg total) by mouth daily 90 tablet 3    Cranberry 1000 MG CAPS Take 1 tablet by mouth 2 (two) times a day   cyanocobalamin 1000 MCG tablet Take 100 mcg by mouth daily      dextromethorphan-guaiFENesin (ROBITUSSIN DM)  mg/5 mL syrup Take 10 mL by mouth every 4 (four) hours as needed for cough 118 mL 0    felodipine (PLENDIL) 5 mg 24 hr tablet Take 1 tablet (5 mg total) by mouth daily 90 tablet 3    finasteride (PROSCAR) 5 mg tablet Take 1 tablet (5 mg total) by mouth daily 90 tablet 3    Flaxseed, Linseed, (EQL FLAX SEED OIL) 1000 MG CAPS Take by mouth daily   fluticasone-salmeterol (ADVAIR) 250-50 mcg/dose inhaler Inhale 1 puff 2 (two) times a day Rinse mouth after use  1 Inhaler 6    fluticasone-vilanterol (BREO ELLIPTA) 100-25 mcg/inh inhaler Inhale 1 puff daily Rinse mouth after use  3 Inhaler 3    furosemide (LASIX) 40 mg tablet Take 1 tablet (40 mg total) by mouth 2 (two) times a day 180 tablet 3    gabapentin (NEURONTIN) 800 mg tablet TAKE 1 TABLET 3 TIMES A  tablet 0    glucosamine-chondroitin 500-400 MG tablet Take 1 tablet by mouth 2 (two) times a day   ipratropium-albuterol (DUO-NEB) 0 5-2 5 mg/3 mL nebulizer solution Take 1 vial (3 mL total) by nebulization every 4 (four) hours as needed for wheezing or shortness of breath  0    ipratropium-albuterol (DUO-NEB) 0 5-2 5 mg/3 mL nebulizer solution Take 1 vial (3 mL total) by nebulization every 6 (six) hours 60 vial 10    ipratropium-albuterol (DUO-NEB) 0 5-2 5 mg/3 mL nebulizer solution Take 1 vial (3 mL total) by nebulization 3 (three) times a day 270 mL 1    lactase (LACTAID) 3,000 units tablet Take 3,000 Units by mouth as needed   methylPREDNISolone 4 MG tablet therapy pack Use as directed on package 21 each 0    metoprolol tartrate (LOPRESSOR) 25 mg tablet Take 1 tablet (25 mg total) by mouth every 12 (twelve) hours 60 tablet 10    Omega-3 Fatty Acids (FISH OIL) 1,000 mg Take 1,000 mg by mouth 2 (two) times a day        PARoxetine (PAXIL) 20 mg tablet TAKE 1/2 TABLET BY MOUTH DAILY 30 tablet 0    psyllium (METAMUCIL) 58 6 % powder Take 1 packet by mouth daily as needed       rivaroxaban (XARELTO) 15 mg tablet Take 1 tablet (15 mg total) by mouth daily with breakfast (Patient taking differently: Take 20 mg by mouth daily with breakfast )      simvastatin (ZOCOR) 20 mg tablet Take 1 tablet (20 mg total) by mouth daily at bedtime 90 tablet 3    traMADol (ULTRAM) 50 mg tablet Take 50 mg by mouth every 6 (six) hours as needed for moderate pain      [DISCONTINUED] albuterol (VENTOLIN HFA) 90 mcg/act inhaler Inhale 2 puffs every 6 (six) hours as needed for wheezing 1 Inhaler 6     No current facility-administered medications on file prior to visit          Past Medical History:   Diagnosis Date    Arthritis     [facility] flutter ([facility] [address]     Chronic kidney disease     stage 3    Coronary artery disease     2 stents    Fluid retention     Gout     Heart failure (HCC)     pacemaker    Hypertension     Pacemaker     Pulmonary emphysema ([facility] [address] [facility]     Radiculopathy     last assessed 1/28/16     Shortness of breath     exertion    Sleep apnea     c pap       Past Surgical History:   Procedure Laterality Date    ANGIOPLASTY      x2 2 stents and then replaced    CARDIAC PACEMAKER PLACEMENT      pacemaker permanent placement dual chamber / last assessed 4/7/14 / implantation     CARDIAC SURGERY      pacemaker    CHOLECYSTECTOMY      CORONARY ANGIOPLASTY WITH STENT PLACEMENT      EPIDURAL BLOCK INJECTION N/A 5/26/2016    Procedure: BLOCK / INJECTION EPIDURAL STEROID LUMBAR  L4-5;  Surgeon: Radha Millard MD;  Location: [facility] MAIN OR;  Service:     EPIDURAL BLOCK INJECTION N/A 2/14/2019    Procedure: L4 L5 Lumbar Epidural Steroid Injection;  Surgeon: Radha Millard MD;  Location: [facility] MAIN OR;  Service: Pain Management     EYE SURGERY      cataract left    KNEE ARTHROSCOPY W/ MENISCAL REPAIR Left     LUMBAR EPIDURAL INJECTION N/A 3/17/2016    Procedure: BLOCK / INJECTION LUMBAR  L4-5  (C-ARM); Surgeon: Marie Pressley MD;  Location: [facility] MAIN OR;  Service:           reports that he quit smoking about 3 months ago  His smoking use included cigarettes  He has a 50 00 pack-year smoking history  He has never used smokeless tobacco  He reports that he does not drink alcohol or use drugs  reports that he quit smoking about 3 months ago  His smoking use included cigarettes  He has a 50 00 pack-year smoking history   He has never used smokeless tobacco         Review of Systems    Physical Exam

## 2019-07-22 NOTE — PROGRESS NOTES
I spoke with patient regarding recent titration study  He is aware that unsuccessful titration of CPAP on 07/18 2019  The interpretation and impression by Freda Garcia is as follows:  He had continuation of respiratory events a CPAP of 19  He will require BiPAP due to failure of CPAP very  Recommendation is trial up auto titration BiPAP with minimum EPAP of 15 pressure support of 4 maximum IPAP of 25 with heated humidity occasion  He also had increased number of limb movements  If daytime sleepiness persist or leg movements after treatment of JOVI  Pharmacologic treatments with Neurontin Lyrica Mirapex or Requip will be considered  Patient is aware that he will need a compliance visit

## 2019-07-23 ENCOUNTER — PATIENT OUTREACH (OUTPATIENT)
Dept: FAMILY MEDICINE CLINIC | Facility: CLINIC | Age: 79
End: 2019-07-23

## 2019-07-23 DIAGNOSIS — B35.9 DERMATOPHYTOSIS: ICD-10-CM

## 2019-07-23 DIAGNOSIS — I70.209 PERIPHERAL ARTERIOSCLEROSIS (HCC): ICD-10-CM

## 2019-07-23 DIAGNOSIS — M54.16 RADICULOPATHY OF LUMBAR REGION: ICD-10-CM

## 2019-07-23 RX ORDER — GABAPENTIN 800 MG/1
800 TABLET ORAL 3 TIMES DAILY
Qty: 270 TABLET | Refills: 0 | Status: SHIPPED | OUTPATIENT
Start: 2019-07-23 | End: 2019-09-26

## 2019-07-23 NOTE — PROGRESS NOTES
PCP visit 7/22/19 for gout flare and O2 sat decrease  I called [facility] to make sure he has started new medications  He assures me he has  Med reconciliation is done with daughter Heather Campo  We review symptoms of pneumonia, need for hydration, cxr 7/24  Wheezing is present, resolves with cough  Edema demetrius legs is not worse  Lucinda Bearden will cancel PT for today  I encourage movement at home  Lucinda Bearden is still using incentive spirometer from last pneumonia admission

## 2019-07-24 ENCOUNTER — APPOINTMENT (OUTPATIENT)
Dept: RADIOLOGY | Facility: CLINIC | Age: 79
End: 2019-07-24
Payer: MEDICARE

## 2019-07-24 ENCOUNTER — OFFICE VISIT (OUTPATIENT)
Dept: PHYSICAL THERAPY | Facility: CLINIC | Age: 79
End: 2019-07-24
Payer: MEDICARE

## 2019-07-24 ENCOUNTER — CLINICAL SUPPORT (OUTPATIENT)
Dept: CARDIAC REHAB | Facility: CLINIC | Age: 79
End: 2019-07-24
Payer: MEDICARE

## 2019-07-24 DIAGNOSIS — Z95.5 STATUS POST PRIMARY ANGIOPLASTY WITH CORONARY STENT: Chronic | ICD-10-CM

## 2019-07-24 DIAGNOSIS — J18.9 PNEUMONIA OF RIGHT LOWER LOBE DUE TO INFECTIOUS ORGANISM: ICD-10-CM

## 2019-07-24 DIAGNOSIS — G89.29 CHRONIC BILATERAL LOW BACK PAIN WITHOUT SCIATICA: Primary | ICD-10-CM

## 2019-07-24 DIAGNOSIS — M54.50 CHRONIC BILATERAL LOW BACK PAIN WITHOUT SCIATICA: Primary | ICD-10-CM

## 2019-07-24 PROCEDURE — 93798 PHYS/QHP OP CAR RHAB W/ECG: CPT

## 2019-07-24 PROCEDURE — 71046 X-RAY EXAM CHEST 2 VIEWS: CPT

## 2019-07-24 PROCEDURE — 97110 THERAPEUTIC EXERCISES: CPT | Performed by: PHYSICAL THERAPIST

## 2019-07-24 NOTE — PROGRESS NOTES
Daily Note     Today's date: 2019  Patient name: Fermin Schwartz  : 1940  MRN: [id number]  Referring provider: Horace Baires MD  Dx:   Encounter Diagnosis     ICD-10-CM    1  Chronic bilateral low back pain without sciatica M54 5     G89 29                   Subjective: Pt reports he recently experienced an episode of R hand swelling which was treated by his MD , taking a steroid taper at this time, has improved  Pt feels "great" today and is curious if this is related to recent prescription  Pt reports ongoing intermittent LBP however of lesser frequency and intensity, denies any LBP today, 010  Objective: See treatment diary below  Pt amb form parking lot to gym and from gy to parking lot with one sitting break mid distance without AD today and moderate SOB  Precautions: Standard  Fall risk  SOB  Blood thinners  Daily Treatment Diary     Manual  6/3 6/10 6/12 6/17 6/20 7/15 7/17 7/24                                                                          Exercise Diary  1 2 3 4 5 6 7 8     Posture Rev 10' Rev rev rev rev rev rev rev     DEANNA 10x 2x10 2x10 2x10 2x10 10x 10x3 10x3     TrvA  Seated 3x10 3x10 3x10 3x10        TrvA  Push down standing 3x10 3x10   4x10  4x10     Ball sq  Seated 4x10           Hip abd  4x10  Standing 3x10   2x10 3lbs 3x10     TrvA March stand   3x10 ea and sit 4lbs 3x10 3x10 10x  3lbs 3x10     TrvA LAQ    4lbs 3x10 3x10 3x10       Hamstring curls     4lbs 3x10        Mini sq     2x10 STS 10x 15x 15x                                                                                                             HEP Edu/initiated Rev rev rev rev rev rev rev     Time 10' 40' 36' 40' 45' 50' 40'          Modalities                                            Assessment: Tolerated treatment well  Patient demo ongoing SOB however is generally progressing well toward est goals especially with progresion of cardiac rehab  Plan: Continue per plan of care    Pt will continue for 3more weeks and then transition to I HEP

## 2019-07-26 ENCOUNTER — CLINICAL SUPPORT (OUTPATIENT)
Dept: CARDIAC REHAB | Facility: CLINIC | Age: 79
End: 2019-07-26
Payer: MEDICARE

## 2019-07-26 DIAGNOSIS — Z98.61 S/P PERCUTANEOUS TRANSLUMINAL CORONARY ANGIOPLASTY: ICD-10-CM

## 2019-07-26 PROCEDURE — 93798 PHYS/QHP OP CAR RHAB W/ECG: CPT

## 2019-07-29 ENCOUNTER — CLINICAL SUPPORT (OUTPATIENT)
Dept: CARDIAC REHAB | Facility: CLINIC | Age: 79
End: 2019-07-29
Payer: MEDICARE

## 2019-07-29 ENCOUNTER — OFFICE VISIT (OUTPATIENT)
Dept: PHYSICAL THERAPY | Facility: CLINIC | Age: 79
End: 2019-07-29
Payer: MEDICARE

## 2019-07-29 ENCOUNTER — TELEPHONE (OUTPATIENT)
Dept: FAMILY MEDICINE CLINIC | Facility: CLINIC | Age: 79
End: 2019-07-29

## 2019-07-29 DIAGNOSIS — G89.29 CHRONIC BILATERAL LOW BACK PAIN WITHOUT SCIATICA: Primary | ICD-10-CM

## 2019-07-29 DIAGNOSIS — M54.50 CHRONIC BILATERAL LOW BACK PAIN WITHOUT SCIATICA: Primary | ICD-10-CM

## 2019-07-29 DIAGNOSIS — Z95.5 STATUS POST PRIMARY ANGIOPLASTY WITH CORONARY STENT: Chronic | ICD-10-CM

## 2019-07-29 DIAGNOSIS — J20.9 ACUTE BRONCHITIS, UNSPECIFIED ORGANISM: Primary | ICD-10-CM

## 2019-07-29 PROCEDURE — 93798 PHYS/QHP OP CAR RHAB W/ECG: CPT

## 2019-07-29 PROCEDURE — 97110 THERAPEUTIC EXERCISES: CPT | Performed by: PHYSICAL THERAPIST

## 2019-07-29 RX ORDER — CEFPODOXIME PROXETIL 200 MG/1
200 TABLET, FILM COATED ORAL 2 TIMES DAILY
Qty: 10 TABLET | Refills: 0 | Status: SHIPPED | OUTPATIENT
Start: 2019-07-29 | End: 2019-08-03

## 2019-07-29 NOTE — PROGRESS NOTES
Cardiac Rehabilitation Plan of Care   60 day      Today's date: 2019   Visits: 13  Patient name: Jeanie Nunes      : 1940  Age: 78 y o  MRN: [id number]  Referring Physician: NAVA Degroot Caro  Provider: Rosa Chambers  Clinician: Sravain Ruffin RN    Dx:   Encounter Diagnosis   Name Primary?  Status post primary angioplasty with coronary stent      Date of onset: 2019      SUMMARY OF PROGRESS:  Mr Dianna Rios completed 13 sessions of the cardiac rehabilitation program  Telemetry monitor ventricular pace and capture at rest and with exercise  Short of breath with minimal exertion  Poor posture  States he has chronic back pain  His exercise MET level increased from 1 5 to 2 1 MET's  He completes 36 minutes of cardiovascular exercise  He missed several sessions due to recent hospitalization for pneumonia  Will continue to monitor and encourage breathing techniques to conserve energy and proper posture to reduce back discomfort  Medication compliance: Yes   Comments: compliant with medications, spouse assist with medication  Fall Risk: Moderate   Comments: short of breath with minimal exertion, ambulates with assist of walker and chronic back discomfort    EKG changes: Ventricular pace and capture      EXERCISE ASSESSMENT and PLAN    Current Exercise Program in Rehab:       Frequency: 3 days/week        Minutes: 34         METS: 2 1            HR: 71   RPE: 4  RPD 4         Modalities: NuStep      Exercise Progression 30 Day Goals :    Frequency: 3 days/week   Minutes: 31-45   METS: 2 1-2 5   HR:     RPE: 4-6   Modalities: NuStep    Strength trainin-3 days / week, 12-15 repitations , 1-2 sets per modality , Will be added following 2-3 weeks of monitored exercise sessions   Modalities: lateral and frontal raises, upright rows, shoulder shrugs, Arm Curl and Chair Squats    Progressing:   In Progress    Home Exercise: Type: exercise for lower body with 2 to 5 pound leg weights    Goals: increase endurance and strength to return to normal activities such as mowing the lawn, increase endurance to walk longer than 350 feet in 6 minutes in 12 weeks, learn to breathing techniques to conserve energy  Education: RPE scale , staying active in warm weather tips  Plan:education on home exercise guidelines  Readiness to change: Preparation      NUTRITION ASSESSMENT AND PLAN    Weight control:    Starting weight: 360 5   Current weight:     Waist circumference:    Startin   Current:    Diabetes: N/A  Lipid management: Discussed diet and lipid management and Last lipid profile 123  Chol 123    HDL 38  LDL 62  Goals:continue to decrease weight with increase activity and consuming a heart healthy diet, my plate  Education: heart healthy eating, reading food labels, the benefits of losing body fat and healthy fat choices  Progressing: In Progress  Plan: states he is eating healthy at this time scored 58 on rate your plate  Readiness to change: Action      PSYCHOSOCIAL ASSESSMENT AND PLAN    Emotional:              0 =No Depression  Self-reported stress level: 0   Social support: Very Good  Goals:  improved positive thoughts of well being and increased energy  Education: signs/sxs of depression, benefits of positive support system and depression and CAD, stress and relaxation technigues     Progressing:Goal Met  Plan: Practice relaxation techniques  Readiness to change: Preparation      OTHER CORE COMPONENTS     Tobacco:   Social History     Tobacco Use   Smoking Status Former Smoker    Packs/day: 1 00    Years: 50 00    Pack years: 50 00    Types: Cigarettes    Last attempt to quit: 3/27/2019    Years since quittin 3   Smokeless Tobacco Never Used   Tobacco Comment    quit 2 weeks ago       Tobacco Use Intervention: Referral to tobacco expert:   [place] Quit Line -QUIT-NOW    Blood pressure:    Restin/60   Exercise: 132/60    Goals: consistent BP < 130/80 and Abstain from smoking  Education:  understanding HTN and CAD  Progressing: In Progress  Plan: continue to cease smoking, consume a heart healthy diet and take medications as prescribed  Readiness to change: Action

## 2019-07-29 NOTE — PROGRESS NOTES
Daily Note     Today's date: 2019  Patient name: Torrie Sanchez  : 1940  MRN: [id number]  Referring provider: Katherine Vaz MD  Dx:   Encounter Diagnosis     ICD-10-CM    1  Chronic bilateral low back pain without sciatica M54 5     G89 29        Start Time: 930  Stop Time: 1015  Total time in clinic (min): 45 minutes    Subjective: Pt reports he is still experiencing intermittent LBP however if rare occasion and 5/10 instantaneous pain  Objective: See treatment diary below  Amb without RW to and from parked vehicle  Precautions: Standard  Fall risk  SOB  Blood thinners  Daily Treatment Diary     Manual  6/3 6/10 6/12 6/17 6/20 7/15 7/17 7/24 7/29                                                                         Exercise Diary  1 2 3 4 5 6 7 8 9    Posture Rev 10' Rev rev rev rev rev rev rev rev    DEANNA 10x 2x10 2x10 2x10 2x10 10x 10x3 10x3 10x2    TrvA  Seated 3x10 3x10 3x10 3x10        TrvA  Push down standing 3x10 3x10   4x10  4x10 3x10    Ball sq  Seated 4x10           Hip abd  4x10  Standing 3x10   2x10 3lbs 3x10 3lbs 3x10    TrvA March stand   3x10 ea and sit 4lbs 3x10 3x10 10x  3lbs 3x10 3x10    TrvA LAQ    4lbs 3x10 3x10 3x10   3x10 3lbs    Hamstring curls     4lbs 3x10        Mini sq     2x10 STS 10x 15x 15x 3x10                                                                                                            HEP Edu/initiated Rev rev rev rev rev rev rev     Time 10' 40' 36' 36' 45' 50' 40'          Modalities                                              Assessment: Tolerated treatment well  Patient demo inc act leonard however SOB with all exertion  Plan: Continue per plan of care  Pt is schedule for 2 more weeks, possible DC at that time to [facility]P

## 2019-07-29 NOTE — TELEPHONE ENCOUNTER
----- Message from Marilee Medina MD sent at 7/29/2019  9:34 AM EDT -----  Chest x-ray shows improvement of prior R lower lobe infiltrate  Pulmonary congestion  Continue diuretics and current pulmonary treatment     Cardiology follow up

## 2019-07-29 NOTE — TELEPHONE ENCOUNTER
Patient advised of Dr Cecilia Becker message  He had a f/u with Valentin Fentonin on 7/17 and f/u with her in January  He states she is the one who ordered the xray and she also called and went over the results with him and did not seem to be concerned  He has also been doing his cardiac rehab  Patient states that he mainly coughs up clear mucus, but sometimes there is some yellow and green mucus mixed in  Wife and daughter want to know if this ok? Also patient states that sometimes his fingertips with get all wrinkly like his has been soaking them in water  What could this be from, and is it anything to be concerned by? Patient advised Dr Asiya Thapa not in the office and is asking if Dr Obie Sloan can advise    Roseline Jaquez MA

## 2019-07-30 NOTE — TELEPHONE ENCOUNTER
Spoke with pt and pts daughter, aware more abx were sent in to pharmacy and to follow up with Dr Noe Swift when he returns  No further action needed   Kota Lucas, [address]

## 2019-07-30 NOTE — TELEPHONE ENCOUNTER
Please let them know they could be on an additional 5 more days of the antibiotic that Dr Blaze Kumar started on 7/22/19 (called cefpodoxime) and it will be sent in  He should follow up with Dr Blaze Kumar when he returns from vacation for re-evaluation, to make sure he is not getting dehydrated, but continue his medications in the meantime

## 2019-07-31 ENCOUNTER — CLINICAL SUPPORT (OUTPATIENT)
Dept: CARDIAC REHAB | Facility: CLINIC | Age: 79
End: 2019-07-31
Payer: MEDICARE

## 2019-07-31 ENCOUNTER — OFFICE VISIT (OUTPATIENT)
Dept: PHYSICAL THERAPY | Facility: CLINIC | Age: 79
End: 2019-07-31
Payer: MEDICARE

## 2019-07-31 DIAGNOSIS — Z95.5 STATUS POST PRIMARY ANGIOPLASTY WITH CORONARY STENT: Chronic | ICD-10-CM

## 2019-07-31 DIAGNOSIS — G89.29 CHRONIC BILATERAL LOW BACK PAIN WITHOUT SCIATICA: Primary | ICD-10-CM

## 2019-07-31 DIAGNOSIS — M54.50 CHRONIC BILATERAL LOW BACK PAIN WITHOUT SCIATICA: Primary | ICD-10-CM

## 2019-07-31 PROCEDURE — 93798 PHYS/QHP OP CAR RHAB W/ECG: CPT

## 2019-07-31 PROCEDURE — 97110 THERAPEUTIC EXERCISES: CPT | Performed by: PHYSICAL THERAPIST

## 2019-07-31 NOTE — PROGRESS NOTES
Daily Note     Today's date: 2019  Patient name: Nichole Garsia  : 1940  MRN: [id number]  Referring provider: Rocio Walker MD  Dx:   Encounter Diagnosis     ICD-10-CM    1  Chronic bilateral low back pain without sciatica M54 5     G89 29                   Subjective: Pt reports he notices he is gaining strength and mobility  He does report "yesterday was the worst" regarding LBP, he reports he does not understand why his symptoms were so bad but when he attempted DEANNA he was only performing "little ones" indicating midrange mobility and stretching  He reports this did not relieve his symptoms however today he denies any LBP  Objective: See treatment diary below  Inc emphasis on end range extension for LBP symptom management  Pt able to amb from car to gym and back with 1 rest break midway, nil PT supervision today  Precautions: Standard  Fall risk  SOB  Blood thinners  Daily Treatment Diary     Manual  6/3 6/10 6/12 6/17 6/20 7/15 7/17 7/24 7/29 7/31                                                                        Exercise Diary  1 2 3 4 5 6 7 8 9 10   Posture Rev 10' Rev rev rev rev rev rev rev rev rev   DEANNA 10x 2x10 2x10 2x10 2x10 10x 10x3 10x3 10x2 10x2   TrvA  Seated 3x10 3x10 3x10 3x10        TrvA  Push down standing 3x10 3x10   4x10  4x10 3x10    Ball sq  Seated 4x10           Hip abd  4x10  Standing 3x10   2x10 3lbs 3x10 3lbs 3x10 3x10   TrvA March stand   3x10 ea and sit 4lbs 3x10 3x10 10x  3lbs 3x10 3x10 3x10   TrvA LAQ    4lbs 3x10 3x10 3x10   3x10 3lbs 3x10   Hamstring curls     4lbs 3x10        Mini sq     2x10 STS 10x 15x 15x 3x10 3x10                                                                                                           HEP Edu/initiated Rev rev rev rev rev rev rev  rev   Time 10' 40' 40' 36' 45' 50' 40'   40'       Modalities                                              Assessment: Tolerated treatment well   Patient demo good I with HEP and therapeurti exercises  Plan: Continue per plan of care  2 weeks of skilled PT schedued, pt may transiiton to I HEP and gym exercise beyodn that time

## 2019-08-01 ENCOUNTER — PATIENT OUTREACH (OUTPATIENT)
Dept: FAMILY MEDICINE CLINIC | Facility: CLINIC | Age: 79
End: 2019-08-01

## 2019-08-01 NOTE — PROGRESS NOTES
Holly Luna states " I feel fine " He is attending cardiac rehab and PT for back pain  Back pain is intermittent and episodes have decreased  Daily weights are ongoing  SOB with exertion but is at or just below baseline as per Holly Luna and daughter Michael Palma  Holly Luna may be agreeable to transfer from PT to [facility] which I encourage  Holly Luna still has not seen cardiology  Again, I explain the multiple reasons he should do so and he and Michael Palma agree to call  Holly Luna has lost a few pounds, seems motivated to stay active  Recent CXR shows improvement  Michael Palma states Dr Juan Alberto Gudino ordered another round of Vantin in Dr Gabriel Spann office

## 2019-08-02 ENCOUNTER — CLINICAL SUPPORT (OUTPATIENT)
Dept: CARDIAC REHAB | Facility: CLINIC | Age: 79
End: 2019-08-02
Payer: MEDICARE

## 2019-08-02 DIAGNOSIS — Z98.61 S/P PERCUTANEOUS TRANSLUMINAL CORONARY ANGIOPLASTY: ICD-10-CM

## 2019-08-02 PROCEDURE — 93798 PHYS/QHP OP CAR RHAB W/ECG: CPT

## 2019-08-05 ENCOUNTER — OFFICE VISIT (OUTPATIENT)
Dept: PHYSICAL THERAPY | Facility: CLINIC | Age: 79
End: 2019-08-05
Payer: MEDICARE

## 2019-08-05 ENCOUNTER — CLINICAL SUPPORT (OUTPATIENT)
Dept: CARDIAC REHAB | Facility: CLINIC | Age: 79
End: 2019-08-05
Payer: MEDICARE

## 2019-08-05 DIAGNOSIS — Z98.61 S/P PERCUTANEOUS TRANSLUMINAL CORONARY ANGIOPLASTY: ICD-10-CM

## 2019-08-05 DIAGNOSIS — M54.50 CHRONIC BILATERAL LOW BACK PAIN WITHOUT SCIATICA: Primary | ICD-10-CM

## 2019-08-05 DIAGNOSIS — G89.29 CHRONIC BILATERAL LOW BACK PAIN WITHOUT SCIATICA: Primary | ICD-10-CM

## 2019-08-05 PROCEDURE — 97110 THERAPEUTIC EXERCISES: CPT | Performed by: PHYSICAL THERAPIST

## 2019-08-05 PROCEDURE — 93798 PHYS/QHP OP CAR RHAB W/ECG: CPT

## 2019-08-05 NOTE — PROGRESS NOTES
Daily Note     Today's date: 2019  Patient name: Orin Hills  : 1940  MRN: [id number]  Referring provider: Lauren Og MD  Dx:   Encounter Diagnosis     ICD-10-CM    1  Chronic bilateral low back pain without sciatica M54 5     G89 29        Start Time: 930  Stop Time: 1015  Total time in clinic (min): 45 minutes    Subjective: Pt reports he continues to notice LBP however only while he is walking, "just when I move my legs " He notices his stretching (DEANNA) is performed in midrange not to end range per his description  Pt reports ongoing interest I joining gym however reports "the girl at cardiac rehab told me I can't join the gym until I'm done there in 2 months "       Objective: See treatment diary below  Pursuit of gym membership ongoing, this PT contacted cardiac rehab about this  Precautions: Standard  Fall risk  SOB  Blood thinners  Daily Treatment Diary     Manual  8/5 6/10 6/12 6/17 6/20 7/15 7/17 7/24 7/29 7/31                                                                        Exercise Diary  11 2 3 4 5 6 7 8 9 10   Posture Rev 10' Rev rev rev rev rev rev rev rev rev   DEANNA 10x 2x10 2x10 2x10 2x10 10x 10x3 10x3 10x2 10x2   TrvA  Seated 3x10 3x10 3x10 3x10        TrvA  Push down standing 3x10 3x10   4x10  4x10 3x10    Ball sq  Seated 4x10           Hip abd  4x10  Standing 3x10   2x10 3lbs 3x10 3lbs 3x10 3x10   TrvA March stand 2x10  3x10 ea and sit 4lbs 3x10 3x10 10x  3lbs 3x10 3x10 3x10   TrvA LAQ    4lbs 3x10 3x10 3x10   3x10 3lbs 3x10   Hamstring curls     4lbs 3x10        Mini sq     2x10 STS 10x 15x 15x 3x10 3x10   Hamstring str Seated w SOS 5x10s            RFISit and Stand 10xea             Ball roll out 10x                                                                             HEP Rev Rev rev rev rev rev rev rev  rev   Time 36' 40' 40' 36' 45' 50' 40'   40'       Modalities                                              Assessment: Tolerated treatment well  Patient describing stenotic like symptoms however does respond to extension based exercisis  General activity tolrance cont to imporve  Plan: Continue per plan of care

## 2019-08-07 ENCOUNTER — OFFICE VISIT (OUTPATIENT)
Dept: PHYSICAL THERAPY | Facility: CLINIC | Age: 79
End: 2019-08-07
Payer: MEDICARE

## 2019-08-07 ENCOUNTER — CLINICAL SUPPORT (OUTPATIENT)
Dept: CARDIAC REHAB | Facility: CLINIC | Age: 79
End: 2019-08-07
Payer: MEDICARE

## 2019-08-07 ENCOUNTER — TELEPHONE (OUTPATIENT)
Dept: FAMILY MEDICINE CLINIC | Facility: CLINIC | Age: 79
End: 2019-08-07

## 2019-08-07 DIAGNOSIS — M54.50 CHRONIC BILATERAL LOW BACK PAIN WITHOUT SCIATICA: Primary | ICD-10-CM

## 2019-08-07 DIAGNOSIS — Z98.61 POSTSURGICAL PERCUTANEOUS TRANSLUMINAL CORONARY ANGIOPLASTY STATUS: ICD-10-CM

## 2019-08-07 DIAGNOSIS — G89.29 CHRONIC BILATERAL LOW BACK PAIN WITHOUT SCIATICA: Primary | ICD-10-CM

## 2019-08-07 PROCEDURE — 93798 PHYS/QHP OP CAR RHAB W/ECG: CPT

## 2019-08-07 PROCEDURE — 97110 THERAPEUTIC EXERCISES: CPT

## 2019-08-07 NOTE — TELEPHONE ENCOUNTER
Wife Esvin Wilson called to schedule f/u appointment  States she advised by [facility]  Advised per Carmen's not that patient is to f/u with cardiologist   Also advised per Dr Jacque Nix that unless patient is having worsening sxs he does not need to f/u at this time  He also agrees that he should f/u with cardiologist and pulmonologist   Wife states patient finished second course of abx and wanted to know if should be rx'd more  Advised per Dr Jacque Nix that unless patient develops fever of sxs become worse, no another course is not necessary at this time  Patient has appointment set up with cardiology next Wednesday 8/14 and a ct scan on 8/27  No further action needed at this time

## 2019-08-07 NOTE — PROGRESS NOTES
Daily Note     Today's date: 2019  Patient name: Eric Carranza  : 1940  MRN: [id number]  Referring provider: Jayden Flynn MD  Dx:   Encounter Diagnosis     ICD-10-CM    1  Chronic bilateral low back pain without sciatica M54 5     G89 29        Start Time: 930  Stop Time: 1005  Total time in clinic (min): 35 minutes    Subjective: Pt reports he experienced "sharp" pain while walking the other day but was able to perform exercises and it subsided  Objective: See treatment diary below  Pursuit of gym membership ongoing, this PT contacted cardiac rehab about this  Precautions: Standard  Fall risk  SOB  Blood thinners  Daily Treatment Diary     Manual  8/5 8/7  6/17 6/20 7/15 7/17 7/24 7/29 7/31                                                                        Exercise Diary  11 12  4 5 6 7 8 9 10   Posture Rev 10' Rev rev rev rev rev rev rev rev rev   DEANNA 10x 2x10 2x10 2x10 2x10 10x 10x3 10x3 10x2 10x2   TrvA   3x10 3x10 3x10        TrvA   3x10   4x10  4x10 3x10    Ball sq             Hip abd    Standing 3x10   2x10 3lbs 3x10 3lbs 3x10 3x10   TrvA March stand 2x10 2x10 3x10 ea and sit 4lbs 3x10 3x10 10x  3lbs 3x10 3x10 3x10   TrvA LAQ    4lbs 3x10 3x10 3x10   3x10 3lbs 3x10   Hamstring curls     4lbs 3x10        Mini sq     2x10 STS 10x 15x 15x 3x10 3x10   Hamstring str Seated w SOS 5x10s Seated w SOS 5x10s           RFISit and Stand 10xea 10x            Ball roll out 10x [employer]  10x                                                                            HEP Rev Rev rev rev rev rev rev rev  rev   Time 36' 40' 36' 36' 45' 50' 40'   40'       Modalities                                              Assessment: Tolerated treatment well  Patient able to perform all exercises with good form  No complaints during or after session  Plan: Continue per plan of care

## 2019-08-09 ENCOUNTER — CLINICAL SUPPORT (OUTPATIENT)
Dept: CARDIAC REHAB | Facility: CLINIC | Age: 79
End: 2019-08-09
Payer: MEDICARE

## 2019-08-09 DIAGNOSIS — Z95.5 STATUS POST PRIMARY ANGIOPLASTY WITH CORONARY STENT: Chronic | ICD-10-CM

## 2019-08-09 PROCEDURE — 93798 PHYS/QHP OP CAR RHAB W/ECG: CPT

## 2019-08-12 ENCOUNTER — CLINICAL SUPPORT (OUTPATIENT)
Dept: CARDIAC REHAB | Facility: CLINIC | Age: 79
End: 2019-08-12
Payer: MEDICARE

## 2019-08-12 ENCOUNTER — OFFICE VISIT (OUTPATIENT)
Dept: PHYSICAL THERAPY | Facility: CLINIC | Age: 79
End: 2019-08-12
Payer: MEDICARE

## 2019-08-12 DIAGNOSIS — G89.29 CHRONIC BILATERAL LOW BACK PAIN WITHOUT SCIATICA: Primary | ICD-10-CM

## 2019-08-12 DIAGNOSIS — Z98.61 S/P PERCUTANEOUS TRANSLUMINAL CORONARY ANGIOPLASTY: ICD-10-CM

## 2019-08-12 DIAGNOSIS — M54.50 CHRONIC BILATERAL LOW BACK PAIN WITHOUT SCIATICA: Primary | ICD-10-CM

## 2019-08-12 PROCEDURE — 93798 PHYS/QHP OP CAR RHAB W/ECG: CPT

## 2019-08-12 PROCEDURE — 97110 THERAPEUTIC EXERCISES: CPT

## 2019-08-12 NOTE — PROGRESS NOTES
Daily Note     Today's date: 2019  Patient name: Edd Arreola  : 1940  MRN: [id number]  Referring provider: Bill Frost MD  Dx:   Encounter Diagnosis     ICD-10-CM    1  Chronic bilateral low back pain without sciatica M54 5     G89 29                   Subjective: Pt reports he experienced "sharp" pain while walking the other day but was able to perform exercises and it subsided  Objective: See treatment diary below  Pursuit of gym membership ongoing, this PT contacted cardiac rehab about this  Precautions: Standard  Fall risk  SOB  Blood thinners  Daily Treatment Diary     Manual                                                                                 Exercise Diary  11 12 13          Posture Rev 10' Rev Rev          DEANNA 10x 2x10 2x10          TrvA             TrvA             Ball sq             Hip abd             TrvA March stand 2x10 2x10 30x          TrvA LAQ             Hamstring curls             Mini sq             Hamstring str Seated w SOS 5x10s Seated w SOS 5x10s Seated w SOS 5x10s          RFISit and Stand 10xea 10x 10x           Ball roll out 10x [place]  10x          Standing TrA with ball press   30x          Pallof press   Red   20x                                                 HEP Rev Rev Rev          Time 36' 40' 45'              Modalities                                              Assessment: Tolerated treatment well  Patient able to perform all exercises with good form  Initiated Pallof press and pt required seated rest break  Plan: Continue per plan of care

## 2019-08-14 ENCOUNTER — OFFICE VISIT (OUTPATIENT)
Dept: PHYSICAL THERAPY | Facility: CLINIC | Age: 79
End: 2019-08-14
Payer: MEDICARE

## 2019-08-14 ENCOUNTER — CLINICAL SUPPORT (OUTPATIENT)
Dept: CARDIAC REHAB | Facility: CLINIC | Age: 79
End: 2019-08-14
Payer: MEDICARE

## 2019-08-14 ENCOUNTER — OFFICE VISIT (OUTPATIENT)
Dept: PODIATRY | Facility: CLINIC | Age: 79
End: 2019-08-14
Payer: MEDICARE

## 2019-08-14 VITALS
SYSTOLIC BLOOD PRESSURE: 134 MMHG | HEIGHT: 73 IN | BODY MASS INDEX: 41.75 KG/M2 | DIASTOLIC BLOOD PRESSURE: 82 MMHG | HEART RATE: 78 BPM | RESPIRATION RATE: 17 BRPM | WEIGHT: 315 LBS

## 2019-08-14 DIAGNOSIS — M25.572 PAIN IN JOINTS OF BOTH FEET: ICD-10-CM

## 2019-08-14 DIAGNOSIS — G89.29 CHRONIC BILATERAL LOW BACK PAIN WITHOUT SCIATICA: Primary | ICD-10-CM

## 2019-08-14 DIAGNOSIS — I70.209 ATHEROSCLEROSIS OF ARTERIES OF EXTREMITIES (HCC): ICD-10-CM

## 2019-08-14 DIAGNOSIS — Z95.5 STATUS POST PRIMARY ANGIOPLASTY WITH CORONARY STENT: Chronic | ICD-10-CM

## 2019-08-14 DIAGNOSIS — M54.16 RADICULOPATHY OF LUMBAR REGION: ICD-10-CM

## 2019-08-14 DIAGNOSIS — M25.571 PAIN IN JOINTS OF BOTH FEET: ICD-10-CM

## 2019-08-14 DIAGNOSIS — I70.209 PERIPHERAL ARTERIOSCLEROSIS (HCC): ICD-10-CM

## 2019-08-14 DIAGNOSIS — M54.50 CHRONIC BILATERAL LOW BACK PAIN WITHOUT SCIATICA: Primary | ICD-10-CM

## 2019-08-14 DIAGNOSIS — M21.969 ACQUIRED DEFORMITY OF ANKLE AND FOOT, UNSPECIFIED LATERALITY: Primary | ICD-10-CM

## 2019-08-14 PROCEDURE — 93798 PHYS/QHP OP CAR RHAB W/ECG: CPT

## 2019-08-14 PROCEDURE — 99213 OFFICE O/P EST LOW 20 MIN: CPT | Performed by: PODIATRIST

## 2019-08-14 PROCEDURE — 97530 THERAPEUTIC ACTIVITIES: CPT | Performed by: PHYSICAL THERAPIST

## 2019-08-14 NOTE — PROGRESS NOTES
Assessment/Plan:  Pain upon ambulation   Limb pain   Rule out radiculopathy   Edema secondary to venous insufficiency   Rule out degenerative disc disease   Probable spinal stenosis      Plan   Attempt pain control   Patient is taking gabapentin 800 mg t i d   With some Philmore Apolinar has breakthrough pain with Debby Kicks was unable to take Medrol Dosepak as directed   Follow up with pain management      No problem-specific Assessment & Plan notes found for this encounter          There are no diagnoses linked to this encounter        Subjective:   Patient has continued ongoing pain in his legs    however it is improved   He rates pain on a scale 3/10  [facility] is seeing pain management  [facility] is slated to have epidural spinal injection           Past Medical History:   Diagnosis Date    Arthritis      Atrial flutter ([facility] [address] [facility]      Chronic kidney disease       stage 3    Coronary artery disease       2 stents    Fluid retention      Gout      Heart failure ([facility] [address] [facility]       pacemaker    Hypertension      Pacemaker      [facility]      Radiculopathy       last assessed 1/28/16     Shortness of breath       exertion    Sleep apnea       c pap                   Past Surgical History:   Procedure Laterality Date    ANGIOPLASTY         x2 2 stents and then replaced    CARDIAC PACEMAKER PLACEMENT         pacemaker permanent placement dual chamber / last assessed 4/7/14 / implantation     CARDIAC SURGERY         pacemaker    CHOLECYSTECTOMY        CORONARY ANGIOPLASTY WITH STENT PLACEMENT        EPIDURAL BLOCK INJECTION N/A 5/26/2016     Procedure: BLOCK / INJECTION EPIDURAL STEROID LUMBAR  L4-5;  Surgeon: Karen Lyle MD;  Location: [facility] MAIN OR;  Service:     EYE SURGERY         cataract left    KNEE ARTHROSCOPY W/ MENISCAL REPAIR Left      LUMBAR EPIDURAL INJECTION N/A 3/17/2016     Procedure: BLOCK / INJECTION LUMBAR  L4-5  (C-ARM);  Surgeon: Karen Lyle MD;  Location: [facility] MAIN OR;  Service:          No Known Allergies        Current Outpatient Prescriptions:     albuterol (VENTOLIN HFA) 90 mcg/act inhaler, Inhale 2 puffs every 6 (six) hours as needed for wheezing, Disp: 1 Inhaler, Rfl: 6    allopurinol (ZYLOPRIM) 100 mg tablet, Take 1 tablet (100 mg total) by mouth daily, Disp: 90 tablet, Rfl: 3    ascorbic acid (VITAMIN C) 500 mg tablet, Take 500 mg by mouth daily  , Disp: , Rfl:     B Complex Vitamins (B COMPLEX 1 PO), Take 1 tablet by mouth daily  , Disp: , Rfl:     Biotin (BIOTIN 5000) 5 MG CAPS, Take 1,000 mcg by mouth daily  , Disp: , Rfl:     calcium carbonate-vitamin D (OSCAL-D) 500 mg-200 units per tablet, Take 2 tablets by mouth daily at bedtime  , Disp: , Rfl:     ciclopirox (LOPROX) 0 77 % cream, Apply topically 2 (two) times a day for 20 days, Disp: 45 g, Rfl: 2    clopidogrel (PLAVIX) 75 mg tablet, TAKE 1 TABLET DAILY, Disp: 90 tablet, Rfl: 3    collagenase (SANTYL) ointment, Apply 1 application topically daily  , Disp: , Rfl:     Cranberry 1000 MG CAPS, Take 1 tablet by mouth 2 (two) times a day , Disp: , Rfl:     cyanocobalamin 1000 MCG tablet, Take 100 mcg by mouth daily, Disp: , Rfl:     felodipine (PLENDIL) 5 mg 24 hr tablet, TAKE 1 TABLET DAILY, Disp: 90 tablet, Rfl: 3    finasteride (PROSCAR) 5 mg tablet, Take 1 tablet (5 mg total) by mouth daily, Disp: 90 tablet, Rfl: 3    Flaxseed, Linseed, (EQL FLAX SEED OIL) 1000 MG CAPS, Take by mouth daily  , Disp: , Rfl:     fluocinonide (LIDEX) 0 05 % cream, Apply topically 2 (two) times a day for 30 days, Disp: 30 g, Rfl: 2    fluticasone-salmeterol (ADVAIR) 250-50 mcg/dose inhaler, Inhale 1 puff 2 (two) times a day Rinse mouth after use , Disp: 1 Inhaler, Rfl: 6    furosemide (LASIX) 40 mg tablet, TAKE 1 TABLET BY MOUTH DAILY AS NEEDED FOR LEG EDEMA OR DYSNEA, Disp: 30 tablet, Rfl: 0    gabapentin (NEURONTIN) 300 mg capsule, TAKE 1 CAPSULE (300 MG TOTAL) BY MOUTH 2 (TWO) TIMES A DAY FOR 30 DAYS, Disp: 60 capsule, Rfl: 2    gabapentin (NEURONTIN) 300 mg capsule, Take 1 capsule (300 mg total) by mouth 3 (three) times a day for 30 days, Disp: 90 capsule, Rfl: 0    gabapentin (NEURONTIN) 600 MG tablet, Take 1 tablet (600 mg total) by mouth 3 (three) times a day for 30 days, Disp: 60 tablet, Rfl: 0    glucosamine-chondroitin 500-400 MG tablet, Take 1 tablet by mouth 2 (two) times a day , Disp: , Rfl:     hydrOXYzine HCL (ATARAX) 25 mg tablet, Take 1 tablet (25 mg total) by mouth 3 (three) times a day for 3 days, Disp: 9 tablet, Rfl: 0    lactase (LACTAID) 3,000 units tablet, Take 3,000 Units by mouth as needed  , Disp: , Rfl:     methylPREDNISolone 4 MG tablet therapy pack, Use as directed on package, Disp: 21 tablet, Rfl: 0    Omega-3 Fatty Acids (FISH OIL) 1,000 mg, Take 1,000 mg by mouth 2 (two) times a day , Disp: , Rfl:     oxyCODONE-acetaminophen (PERCOCET) 5-325 mg per tablet, 1 tablet to be taken 3 times daily as needed for leg pain, Disp: 30 tablet, Rfl: 0    PARoxetine (PAXIL) 20 mg tablet, TAKE 1/2 TABLET BY MOUTH DAILY, Disp: 30 tablet, Rfl: 0    psyllium (METAMUCIL) 58 6 % powder, Take 1 packet by mouth daily Indications: 1 tsp , Disp: , Rfl:     rivaroxaban (XARELTO) tablet, Take 20 mg by mouth , Disp: , Rfl:     simvastatin (ZOCOR) 20 mg tablet, Take 1 tablet (20 mg total) by mouth daily at bedtime, Disp: 90 tablet, Rfl: 3    traMADol (ULTRAM) 50 mg tablet, 1 tablet to be taken twice daily as needed for foot and leg pain, Disp: 30 tablet, Rfl: 0           Patient Active Problem List   Diagnosis    Chronic pain disorder    Bilateral lumbar radiculopathy    Lumbar canal stenosis    Acquired ankle/foot deformity    Pain in joints of both feet    Dermatophytosis    Atherosclerosis of arteries of extremities (HCC)    Pain in both feet    Onychomycosis    Neck muscle spasm    Anxiety 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    Benign hypertension with CKD (chronic kidney disease) stage III ([facility] [address] [facility]    Bilateral leg edema    CKD (chronic kidney disease), stage III (HCC)    Microscopic hematuria    Proteinuria    Urinary frequency    Atrial fibrillation (HCC)    Benign prostatic hyperplasia    Congestive heart failure (HCC)    Arteriosclerosis of coronary artery    Allergic rhinitis    Aneurysm of abdominal aorta (HCC)    Angina pectoris (HCC)    Pain in joint involving ankle and foot    Atrial flutter (HCC)    Carpal tunnel syndrome    Chronic gout without tophus    Chronic low back pain    Chronic obstructive pulmonary disease (HCC)    Colon, diverticulosis    Deep venous insufficiency    Degenerative disc disease, lumbar    Difficulty in walking    Fecal soiling    Fibromyalgia    Fistula-in-ano    Hyperlipidemia    Insomnia, persistent    Memory loss    Moderate or severe vision impairment, one eye    Morbid obesity (HCC)    Nicotine dependence    Osteoarthritis of knee    Pacemaker    Sleep apnea    Urinary incontinence    Venous insufficiency (chronic) (peripheral)    BMI 45 0-49 9, adult (HCC)    Carbuncle of neck    Carbuncle of occipital region of scalp    Elevated glucose    Peripheral arteriosclerosis (HCC)    Radiculopathy of lumbar region    Metatarsalgia of both feet          Physical Exam  Left Foot: Appearance: Normal except as noted: excessive pronation-- and-- pes planus  Second toe deformities include hammer toe  Third toe deformities include hammer toe  Forth toe deformities include hammer toe  Fifth toe deformities include hammer toe  Tenderness: None except the great toe,-- distal first metatarsal,-- distal fifth metatarsal-- and-- insertion of the plantar fascia  Positive Brian sign third left intermetatarsal space  ROM: Full  Motor: Normal   Right Foot: Appearance: Normal except as noted: excessive pronation-- and-- pes planus  Second toe deformities include hammer toe  Third toe deformities include hammer toe   Forth toe deformities include hammer toe  Fifth toe deformities include hammer toe  Evaluation of the great toe nail demonstrates an ingrown nail  Tenderness: None except the insertion of the plantar fascia  Left Ankle: Appearance: Normal except erythema,-- swelling-- and-- swelling laterally  Tenderness: None except the tibiotalar joint  Right Ankle: ROM: Full  Neurological Exam: performed  Deep tendon reflexes: + tinel of right tibial nerve  Vascular Exam: performed Dorsalis pedis pulses were present bilaterally  Posterior tibial pulses were present bilaterally  Elevation Pallor: absent bilaterally  Capillary refill time was less than 1 second bilaterally  Edema: moderate bilaterally-- and-- 4/7 pitting  neg  buddy  Toenails: All of the toenails were elongated,-- hypertrophied,-- discolored,-- ingrown,-- shown to have subungual debris,-- tender-- and-- Mycotic with onychauxis  Hyperkeratosis: present on both first toes,-- present on both first sub metatarsals-- and-- present on both fifth sub metatarsals  Shoe Gear Evaluation: performed ()   Recommendation(s): extra depth diabetic shoes

## 2019-08-14 NOTE — PROGRESS NOTES
PT Re-Evaluation     Today's date: 2019  Patient name: Yuan Landis  : 1940  MRN: [id number]  Referring provider: Isabela Matias MD  Dx:   Encounter Diagnosis     ICD-10-CM    1  Chronic bilateral low back pain without sciatica M54 5 PT plan of care cert/re-cert    T19 54        Start Time: 1015  Stop Time: 1100  Total time in clinic (min): 45 minutes    Assessment  Assessment details: Yuan Lanids is a 66 y o  male who has been regularly participating in skilled PT since time of IE and at this time demo good functional activity tolerance progression and good strength gains however LBP remains intermittent with activity  Pt has been participating 3x/week in cardiac rehab and is interested in joining the gym for maintenance at completion of cardiac therapy however is unable to do so until cleared in 7 weeks  At this time pt LBP is intermittent and short lived however may be provoked with prolonged walking  At this time pt is showing I with HEP and his remaining symptoms may be inflammatory in nature  Pt may benefit from referral to pain management physicians for further evaluation to address residual symptoms  Will follow up with pt next week to confirm    Impairments: abnormal gait, abnormal or restricted ROM, activity intolerance, impaired physical strength, lacks appropriate home exercise program, pain with function, poor posture  and poor body mechanics  Understanding of Dx/Px/POC: good   Prognosis: good    Goals  Short term goals to be accomplished in 3 weeks: -paertially met/ongoing  STG 1: Pt will demo independence with postural management  STG 2: Pt will demo I with HEP to maximize progress between therapy sessions  STG 3: Pt will demo L/S AROM < or = min loss throughout to promote improved functional mobility and body mechanics  STG 4: Pt will demo 1/2 MMT grade core stabilizers to improve lumbar stability with functional challenges  STG 5: Pt will reports pain dec freq and intensity 50%    Long term goals to be accomplished in 6 weeks: - ongoing  LTG 1: Pt will demo good body mech with >75% functional challenges to prevent reinjury  LTG 2: Pt will be able to return to walking without AD in community pain free as per PLOF  LTG 3: Pt will demo Good strength core stabilizers to promote carryover with body mech and posture    Plan  Plan details: Cont HEP development, stretching, strengthening, A/AA/PROM, joint mobilizations, posture education, STM/MI as needed to reduce muscle tension, muscle reeducation, PLOC discussed and agreed upon with patient  Patient would benefit from: PT eval and skilled physical therapy  Planned modality interventions: cryotherapy and thermotherapy: hydrocollator packs  Planned therapy interventions: manual therapy, neuromuscular re-education, self care, therapeutic activities, therapeutic exercise and home exercise program  Frequency: 2x week  Duration in weeks: 6  Treatment plan discussed with: patient        Subjective Evaluation    History of Present Illness  Mechanism of injury: Pt experienced a cardiac episode several months ago to which he was hospitalized and surgically placed 2 stents and was in subacute care for 2 weeks  He has been receiving home PT since his DC form the facility over last several weeks  Since his DC from hospital he has been experiencing low back pina, right sided > left side  He notices pain while walking with RW due to "being bent over " He is noticing he is below his PLOF at this time  He is hopeful to gain strength and minimize his pain to perform car xfer  Pt notices that his pain is primarily noticed when transitioning from sitting to standing and is worse when he "sits wrong " Pt feels that he generally has B LE discomfort and knee "issues " Pt is not taking pain medication at this time and is performing HEP from home PT and subacute facility daily  He is also walking with a RW which he is hopeful to no longer require   He does acknowledges he looses his balance "here and there" due to "not paying attention " He reports history of knee buckling  At time of Re-EVAL 7/15/19 Pt reports he only rarely gets LBP and is now, post hospitalization noticing R sided LBP and R hip pain which is brief in nature and while intense and sharp does not last  Pt feels he is capitalizing well on his HEP  Pt also reports mobility deficits are related primarily related, not to the back, but to his unrelated health issues  He cont to perform cardiac rehab regularly  Pt does feel he is below his PLOF primarily with lifting related tasks for groceries and household duties  At time of Re-EVAL 19 Pt has shown a major reduction in the constant nature of his symptoms however reports intermittent LBP which is strong intensity but short lived in duration with a decreased frequency  He is becoming frustrated with the residual symptoms and is interested in joining the gym after cardiac rehab clears him  Quality of life: fair    Pain  Current pain ratin  At best pain ratin  At worst pain ratin          Objective     Concurrent Complaints  Negative for night pain, disturbed sleep, bladder dysfunction, bowel dysfunction and saddle (S4) numbness    Postural Observations  Seated posture: fair  Standing posture: fair        Active Range of Motion     Lumbar   Flexion:  Restriction level: moderate  Extension:  Restriction level: minimal  Left lateral flexion:  Restriction level: minimal  Right lateral flexion:  Restriction level: minimal    Strength/Myotome Testing     Additional Strength Details  B LE strength 4+/5 MMT  TrvA Fair(+)    Tests     Additional Tests Details  DEANNA: Dec pain      General Comments:      Lumbar Comments  Function:    Gait: Amb in home and community without AD, cheli trunk sway and retropulsive lean      Xfers: Pain free      Flowsheet Rows      Most Recent Value   PT/OT G-Codes   Current Score  88   Projected Score  52   FOTO information reviewed  Yes          Precautions: Standard  Fall risk  SOB  Blood thinners        Daily Treatment Diary     Manual  8/5 8/7 8/12 8/14                                                                              Exercise Diary  11 12 13 14         Posture Rev 10' Rev Rev rev         DEANNA 10x 2x10 2x10 2x10         TrvA             TrvA             Ball sq             Hip abd             TrvA March stand 2x10 2x10 30x rev         TrvA LAQ             Hamstring curls             Mini sq             Hamstring str Seated w SOS 5x10s Seated w SOS 5x10s Seated w SOS 5x10s rev         RFISit and Stand 10xea 10x 10x hold          Ball roll out 10x [place]  10x hold         Standing TrA with ball press   30x          Pallof press   Red   20x                                                 HEP Rev Rev Rev rev         Time 36' 40' 45' 30'             Modalities

## 2019-08-15 ENCOUNTER — TELEPHONE (OUTPATIENT)
Dept: FAMILY MEDICINE CLINIC | Facility: CLINIC | Age: 79
End: 2019-08-15

## 2019-08-15 NOTE — TELEPHONE ENCOUNTER
Patient called and wanted to let us know that he has been coughing up a little bit of yellow mucus when he uses his spirometry and wanted to make sure that was ok  Also wanted to make sure that it was ok that he continue to use the Robitussin cough syrup  Consulted verbally with Dr Blanca Brito who said yes that it was good to bring up a little bit of yellow mucus, but if it becomes darker or green or mixes with blood or patient develops a fever than he needs to call us back  Yes it is ok to continue cough medicine  No further action needed at this time    Anna Marie Schmidt MA

## 2019-08-16 ENCOUNTER — CLINICAL SUPPORT (OUTPATIENT)
Dept: CARDIAC REHAB | Facility: CLINIC | Age: 79
End: 2019-08-16
Payer: MEDICARE

## 2019-08-16 DIAGNOSIS — Z95.5 STATUS POST PRIMARY ANGIOPLASTY WITH CORONARY STENT: Chronic | ICD-10-CM

## 2019-08-16 PROCEDURE — 93798 PHYS/QHP OP CAR RHAB W/ECG: CPT

## 2019-08-19 ENCOUNTER — CLINICAL SUPPORT (OUTPATIENT)
Dept: CARDIAC REHAB | Facility: CLINIC | Age: 79
End: 2019-08-19
Payer: MEDICARE

## 2019-08-19 DIAGNOSIS — Z95.5 STATUS POST PRIMARY ANGIOPLASTY WITH CORONARY STENT: Chronic | ICD-10-CM

## 2019-08-19 DIAGNOSIS — I48.20 CHRONIC ATRIAL FIBRILLATION (HCC): ICD-10-CM

## 2019-08-19 PROCEDURE — 93798 PHYS/QHP OP CAR RHAB W/ECG: CPT

## 2019-08-20 ENCOUNTER — PATIENT OUTREACH (OUTPATIENT)
Dept: FAMILY MEDICINE CLINIC | Facility: CLINIC | Age: 79
End: 2019-08-20

## 2019-08-20 RX ORDER — FUROSEMIDE 40 MG/1
40 TABLET ORAL 2 TIMES DAILY
Qty: 180 TABLET | Refills: 3 | Status: SHIPPED | OUTPATIENT
Start: 2019-08-20 | End: 2019-09-26 | Stop reason: SDUPTHER

## 2019-08-20 NOTE — PROGRESS NOTES
Blanca Jimenez again engage in conversation  Labs are mailed to their home  They will get shingles vaccination  They question need to increase fluid restriction of 40 ounces now that [facility] is moving more  I encourage they seek answer from nephrology as this provider set the restriction originally  Cardiology, Dr Lasha Lee, seen  8/14/19

## 2019-08-20 NOTE — PROGRESS NOTES
Bretta Officer states, "I feel good all the time " Continues with cardiac rehab/PT/ home exercises on days off  Denies wheeze, worsening SOB oor edema  Has lost a few pounds  Current weight is 343 6 lbs  Does c/o blisters right leg  We review s/s infection and encourage he call PCP if these are occurring  Encourage movement, elevation of legs when sitting  Heidi Joshua is concerned she has no paper orders for [employer]  She also questions if Bretta Officer can have the Shingles vaccination  Dr Butts affirms  These are printed and will be mailed today  Adeleraine Zavala is concerned her father is retaining water  We review weights, 1 lb increase today  Again there is no increased edema  We review weight gain parameters and ability to call Dr Ankita Verduzco with concern

## 2019-08-21 ENCOUNTER — CLINICAL SUPPORT (OUTPATIENT)
Dept: CARDIAC REHAB | Facility: CLINIC | Age: 79
End: 2019-08-21
Payer: MEDICARE

## 2019-08-21 ENCOUNTER — TELEPHONE (OUTPATIENT)
Dept: FAMILY MEDICINE CLINIC | Facility: CLINIC | Age: 79
End: 2019-08-21

## 2019-08-21 DIAGNOSIS — Z95.5 STATUS POST PRIMARY ANGIOPLASTY WITH CORONARY STENT: Chronic | ICD-10-CM

## 2019-08-21 PROCEDURE — 93798 PHYS/QHP OP CAR RHAB W/ECG: CPT

## 2019-08-21 NOTE — TELEPHONE ENCOUNTER
Patient has gained a pound a day over the past 3 days, started coughing a bit more over night last night and today  No change in color or production of mucus  States that he is not blowing as high of numbers on the spirometer, but still staying in the blue  Pulse ox is down to 89/90  States overall he feels fine, but wife and daughter are pushing the panic button  Advised per Dr Julian Cuevas to increase water pill to two tablets daily for the next two days and to f/u with cardiologist for wheezing and cough  Patient understands and will call Dr Evy Grimaldo office tomorrow  No further action needed at this time    Vaibhav Whitlock MA

## 2019-08-23 ENCOUNTER — CLINICAL SUPPORT (OUTPATIENT)
Dept: CARDIAC REHAB | Facility: CLINIC | Age: 79
End: 2019-08-23
Payer: MEDICARE

## 2019-08-23 DIAGNOSIS — Z95.5 STATUS POST PRIMARY ANGIOPLASTY WITH CORONARY STENT: Chronic | ICD-10-CM

## 2019-08-23 PROCEDURE — 93798 PHYS/QHP OP CAR RHAB W/ECG: CPT

## 2019-08-26 ENCOUNTER — HOSPITAL ENCOUNTER (OUTPATIENT)
Dept: RADIOLOGY | Facility: HOSPITAL | Age: 79
Discharge: HOME | End: 2019-08-26
Payer: MEDICARE

## 2019-08-26 ENCOUNTER — CLINICAL SUPPORT (OUTPATIENT)
Dept: CARDIAC REHAB | Facility: CLINIC | Age: 79
End: 2019-08-26
Payer: MEDICARE

## 2019-08-26 ENCOUNTER — TELEPHONE (OUTPATIENT)
Dept: FAMILY MEDICINE CLINIC | Facility: CLINIC | Age: 79
End: 2019-08-26

## 2019-08-26 DIAGNOSIS — Z98.61 POSTSURGICAL PERCUTANEOUS TRANSLUMINAL CORONARY ANGIOPLASTY STATUS: ICD-10-CM

## 2019-08-26 DIAGNOSIS — J18.9 PNEUMONIA OF RIGHT LOWER LOBE DUE TO INFECTIOUS ORGANISM: ICD-10-CM

## 2019-08-26 PROCEDURE — 93798 PHYS/QHP OP CAR RHAB W/ECG: CPT

## 2019-08-26 PROCEDURE — 71250 CT THORAX DX C-: CPT

## 2019-08-26 NOTE — PROGRESS NOTES
Cardiac Rehabilitation Plan of Care   90 day       Today's date: 2019   Visits: 25  Patient name: Edd Arreola      : 1940  Age: 78 y o  MRN: [id number]  Referring Physician: NAVA Flores  Provider: Raymon Snow  Clinician: Roseanne Ruffin RN    Dx:   Encounter Diagnosis   Name Primary?  Postsurgical percutaneous transluminal coronary angioplasty status      Date of onset: 2019      SUMMARY OF PROGRESS:  Mr Jaycob Morgan completed 25 sessions of the cardiac rehabilitation program  Telemetry monitor ventricular pace and capture at rest and with exercise  Short of breath with minimal exertion  Poor posture  States he has chronic back pain  His exercise MET level increased from 1 5 to 2 1 MET's  He completes 36-45 minutes of cardiovascular exercise  He missed several sessions due to recent hospitalization for pneumonia  Will continue to monitor and encourage breathing techniques to conserve energy and proper posture to reduce back discomfort  Medication compliance: Yes   Comments: compliant with medications, spouse assist with medication  Fall Risk: Moderate   Comments: short of breath with minimal exertion, ambulates with assist of walker and chronic back discomfort    EKG changes: Ventricular pace and capture      EXERCISE ASSESSMENT and PLAN    Current Exercise Program in Rehab:       Frequency: 3 days/week        Minutes: 34-46         METS: 2 1            HR: 85   RPE: 4-6 RPD 4         Modalities: NuStep      Exercise Progression 30 Day Goals :    Frequency: 3 days/week   Minutes: 31-45   METS: 2 1-2 5   HR:     RPE: 4-6   Modalities: NuStep    Strength trainin-3 days / week, 12-15 repitations , 1-2 sets per modality , Will be added following 2-3 weeks of monitored exercise sessions   Modalities: lateral and frontal raises, upright rows, shoulder shrugs, Arm Curl and Chair Squats    Progressing:   In Progress    Home Exercise: Type: exercise for lower body with 2 to 5 pound leg weights    Goals: increase endurance and strength to return to normal activities such as mowing the lawn, increase endurance to walk longer than 350 feet in 6 minutes in 12 weeks, learn to breathing techniques to conserve energy  Education: RPE scale , staying active in warm weather tips  Plan:education on home exercise guidelines  Readiness to change: Preparation      NUTRITION ASSESSMENT AND PLAN    Weight control:    Starting weight: 360 5   Current weight:     Waist circumference:    Startin   Current:    Diabetes: N/A  Lipid management: Discussed diet and lipid management and Last lipid profile 123  Chol 123    HDL 38  LDL 62  Goals:continue to decrease weight with increase activity and consuming a heart healthy diet, my plate  Education: heart healthy eating, reading food labels, the benefits of losing body fat and healthy fat choices  Healthy summer food choices  Progressing: In Progress  Plan: states he is eating healthy at this time scored 58 on rate your plate  Readiness to change: Action      PSYCHOSOCIAL ASSESSMENT AND PLAN    Emotional:              0 =No Depression  Self-reported stress level: 0   Social support: Very Good  Goals:  improved positive thoughts of well being and increased energy  Education: signs/sxs of depression, benefits of positive support system and depression and CAD, stress and relaxation technigues     Progressing:Goal Met  Plan: Practice relaxation techniques  Readiness to change: Preparation      OTHER CORE COMPONENTS     Tobacco:   Social History     Tobacco Use   Smoking Status Former Smoker    Packs/day: 1 00    Years: 50 00    Pack years: 50 00    Types: Cigarettes    Last attempt to quit: 3/27/2019    Years since quittin 4   Smokeless Tobacco Never Used   Tobacco Comment    quit 2 weeks ago       Tobacco Use Intervention: Referral to tobacco expert:   [place] Quit [place] -QUIT-NOW    Blood pressure:    Resting: 118/62   Exercise: 128/68    Goals: consistent BP < 130/80 and Abstain from smoking  Education:  understanding HTN and CAD, common hear medications  Progressing: In Progress  Plan: continue to cease smoking, consume a heart healthy diet and take medications as prescribed  Readiness to change: Action

## 2019-08-26 NOTE — TELEPHONE ENCOUNTER
Yes double his current diuretic dose x 3 days Low salt diet goal 4 lb wgt loss goal    maximize pulmonary treatments at home     Call/follow up CT scan as ordered by Pulmonary

## 2019-08-26 NOTE — TELEPHONE ENCOUNTER
Spoke with Padmini Mock and [employer]  State patient was doing better after taking the two water pills last week and lost the additional 3lbs  Went away over the weekend, was unable to elevate legs and gained 2 2 lbs  Patient is having swelling in his legs and wheezing again  Advised per verbal from Dr Peg Mina to increase water pill to tablets daily and touch base on Thursday with a weight check and let us know how his swelling and wheezing is  Goal weight <4lbs  Also advised patient that he needs to call Cardiology and Pulmonology to let them know what is going on because they are the specialist and the ones who should really be managing his tx  Patient had ct of chest today, advised to contact Cathy Mckinney before the close of the business day for the results  Wife and patient understand these instructions  No further action needed at this time    Rita Baker MA

## 2019-08-27 ENCOUNTER — PATIENT OUTREACH (OUTPATIENT)
Dept: FAMILY MEDICINE CLINIC | Facility: CLINIC | Age: 79
End: 2019-08-27

## 2019-08-27 ENCOUNTER — CONSULT (OUTPATIENT)
Dept: PAIN MEDICINE | Facility: CLINIC | Age: 79
End: 2019-08-27
Payer: MEDICARE

## 2019-08-27 ENCOUNTER — TELEPHONE (OUTPATIENT)
Dept: NEPHROLOGY | Facility: CLINIC | Age: 79
End: 2019-08-27

## 2019-08-27 VITALS — BODY MASS INDEX: 41.75 KG/M2 | HEIGHT: 73 IN | WEIGHT: 315 LBS

## 2019-08-27 DIAGNOSIS — M48.062 SPINAL STENOSIS, LUMBAR REGION, WITH NEUROGENIC CLAUDICATION: ICD-10-CM

## 2019-08-27 DIAGNOSIS — M54.41 CHRONIC BILATERAL LOW BACK PAIN WITH BILATERAL SCIATICA: ICD-10-CM

## 2019-08-27 DIAGNOSIS — G89.4 CHRONIC PAIN SYNDROME: Primary | ICD-10-CM

## 2019-08-27 DIAGNOSIS — G89.29 CHRONIC BILATERAL LOW BACK PAIN WITH BILATERAL SCIATICA: ICD-10-CM

## 2019-08-27 DIAGNOSIS — M54.42 CHRONIC BILATERAL LOW BACK PAIN WITH BILATERAL SCIATICA: ICD-10-CM

## 2019-08-27 DIAGNOSIS — M54.16 LUMBAR RADICULOPATHY: ICD-10-CM

## 2019-08-27 PROCEDURE — 1123F ACP DISCUSS/DSCN MKR DOCD: CPT | Performed by: ANESTHESIOLOGY

## 2019-08-27 PROCEDURE — 99214 OFFICE O/P EST MOD 30 MIN: CPT | Performed by: ANESTHESIOLOGY

## 2019-08-27 NOTE — PROGRESS NOTES
Mic Murphy MA from PCP office, alerts me to recent phone calls regarding weight gain, SOB, edema  PCP adjusted diuretics to treat this

## 2019-08-27 NOTE — PROGRESS NOTES
Rafat Patino states he has been to see pain management and urology, Dr Riccardo Garcia today  Dr Riccardo Garcia explained water restriction and Rafat Patino understands  Rafat Patino continues to c/o back pain  He is not a candidate for epidural injection, due to anticoagulation medicine, or lumbar surgery, due to cardiac issues  Rafat Patino has not seen a chiropractor in recent years  I encourage Mochristian to call cardiology  He states "Dr Allyson Wells is in [facility] " I then encourage he call Dr Garret Mccarthy  The same message is given to daughter Michelle Paris  Weight today is 344 1  I encourage communcation with PCP and cardiology   We are awaiting results of CT chest

## 2019-08-27 NOTE — TELEPHONE ENCOUNTER
Pt's wife called today stating that PCP increased Lasix to 80 mg bid for the next 4 days due to fluid retention  Pt is going for labs 9/3

## 2019-08-27 NOTE — PROGRESS NOTES
Assessment:  1  Chronic pain syndrome    2  Chronic bilateral low back pain with bilateral sciatica    3  Spinal stenosis, lumbar region, with neurogenic claudication    4  Lumbar radiculopathy        Plan:  My impressions and treatment recommendations were discussed in detail with the patient, who verbalized understanding and had no further questions  The patient is reporting pain primarily in his low back and bilateral lower extremities  I discussed with the patient about the possibility of undergoing a repeat L4-L5 lumbar epidural steroid injection since his most recent one was in February 2019  He states that the relief he experienced was only about 1-2 months  He also reports that his heart condition has deteriorated since that time and he is not sure if he is able to come off of the anticoagulation to undergo the injection  I did discuss with the patient to speak to his family as well as his cardiologist about the possibility of undergoing a repeat lumbar epidural steroid injection at today's visit  The procedures, its risks, and benefits were explained in detail to the patient  Risks include but are not limited to bleeding, infection, hematoma formation, abscess formation, weakness, headache, failure the pain to improve, nerve irritation or damage, and potential worsening of the pain  The patient verbalized understanding  In addition, the patient reports that he "wants this fixed    I discussed with him that what he is looking for is likely lumbar spine surgery and due to his heart condition, I do not think a surgeon what operate  He is not sure which way to proceed at this point in time because he wants a better quality of life  I mentioned to him that his quality of life is tied both into his pain, but also into his functional ability due to his deteriorating heart condition    He states that he does not believe as though he has a diseased heart, but several cardiologists notes along with cardiology rehabilitation notes mention that he has a decreased MET  Follow-up is planned with the patient on an as-needed basis  Discharge instructions were provided  I personally saw and examined the patient and I agree with the above discussed plan of care  History of Present Illness:    Mary Morel is a 78 y o  male who presents to [facility] for initial evaluation of the above stated pain complaints  The patient has a past medical and chronic pain history as outlined in the assessment section  He was referred by David An PT  The patient was complaining of pain that is 10/10 on the verbal numerical pain rating scale  He describes his pain is primarily in his low back and bilateral lower extremities  He describes it as a sharp and pins and needles like sensation  He does report that standing and walking increase pain  There are no pain relieving factors  He did report that nerve blocks injections did give him relief for several months duration  He is interested in undergoing these injections, but is not sure if he is still a candidate due to his deteriorating cardiologic all condition  He recently had several more stents placed in his heart  He does report that the gabapentin is not giving him as much relief as he was hoping for  Review of Systems:    Review of Systems   Constitutional: Negative for fever and unexpected weight change  HENT: Positive for hearing loss  Negative for trouble swallowing  Eyes: Negative for visual disturbance  Respiratory: Positive for cough, shortness of breath and wheezing  Cardiovascular: Positive for leg swelling  Negative for chest pain and palpitations  Gastrointestinal: Negative for constipation, diarrhea, nausea and vomiting  Endocrine: Negative for cold intolerance, heat intolerance and polydipsia  Genitourinary: Negative for difficulty urinating and frequency     Musculoskeletal: Negative for arthralgias, gait problem, joint swelling and myalgias  Skin: Positive for rash  Neurological: Negative for dizziness, seizures, syncope, weakness and headaches  Hematological: Does not bruise/bleed easily  Psychiatric/Behavioral: Negative for dysphoric mood  All other systems reviewed and are negative          Patient Active Problem List   Diagnosis    Chronic pain syndrome    Bilateral lumbar radiculopathy    Lumbar canal stenosis    Acquired ankle/foot deformity    Pain in joints of both feet    Dermatophytosis    Atherosclerosis of arteries of extremities ([facility])    Pain in both feet    Onychomycosis    Neck muscle spasm    Anxiety 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    Benign hypertension with chronic kidney disease, stage III ([facility])    Microscopic hematuria    Proteinuria    Urinary frequency    Chronic atrial fibrillation ([facility])    Benign prostatic hyperplasia    Diastolic congestive heart failure ([facility])    Arteriosclerosis of coronary artery    Allergic rhinitis    Aneurysm of abdominal aorta ([facility])    Angina pectoris ([facility])    Pain in joint involving ankle and foot    Chronic a-fib ([facility])    Carpal tunnel syndrome    Chronic gout without tophus    Chronic bilateral low back pain with bilateral sciatica    COPD (chronic obstructive pulmonary disease) ([facility])    Colon, diverticulosis    Deep venous insufficiency    Degenerative disc disease, lumbar    Difficulty in walking    Fecal soiling    Fibromyalgia    Fistula-in-ano    Hyperlipidemia    Insomnia, persistent    Memory loss    Moderate or severe vision impairment, one eye    Morbid obesity ([facility])    Nicotine dependence    Osteoarthritis of knee    Pacemaker    JOVI (obstructive sleep apnea)    Urinary incontinence    Venous insufficiency (chronic) (peripheral)    BMI 45 0-49 9, adult ([facility])    Carbuncle of neck    Carbuncle of occipital region of scalp    Elevated glucose    Peripheral arteriosclerosis ([facility] [address] )  Lumbar radiculopathy    Metatarsalgia of both feet    Spinal stenosis, lumbar region, with neurogenic claudication    Low back pain    Chest pain with history of CAD s/p stents    Serum total bilirubin elevated    Essential hypertension    CKD (chronic kidney disease) stage 3, GFR 30-59 ml/min ([facility])    CAD (coronary artery disease)    Status post primary angioplasty with coronary stent    Pneumonia due to infectious organism    Mixed simple and mucopurulent chronic bronchitis ([facility])    Wrist pain, acute, right    Hyperuricemia       Past Medical History:   Diagnosis Date    Arthritis     Atrial flutter ([facility])     Chronic kidney disease     stage 3    Coronary artery disease     2 stents    Fluid retention     Gout     Heart failure ([facility])     pacemaker    Hypertension     Pacemaker     Pulmonary emphysema ([facility] [address] [facility]     Radiculopathy     last assessed 1/28/16     Shortness of breath     exertion    Sleep apnea     c pap       Past Surgical History:   Procedure Laterality Date    ANGIOPLASTY      x2 2 stents and then replaced    CARDIAC PACEMAKER PLACEMENT      pacemaker permanent placement dual chamber / last assessed 4/7/14 / implantation     CARDIAC SURGERY      pacemaker    CHOLECYSTECTOMY      CORONARY ANGIOPLASTY WITH STENT PLACEMENT      EPIDURAL BLOCK INJECTION N/A 5/26/2016    Procedure: BLOCK / INJECTION EPIDURAL STEROID LUMBAR  L4-5;  Surgeon: Jaziel Dahl MD;  Location: [place] [address] MAIN OR;  Service:     EPIDURAL BLOCK INJECTION N/A 2/14/2019    Procedure: L4 L5 Lumbar Epidural Steroid Injection;  Surgeon: Jaziel Dahl MD;  Location: [place] [address] MAIN OR;  Service: Pain Management     EYE SURGERY      cataract left    KNEE ARTHROSCOPY W/ MENISCAL REPAIR Left     LUMBAR EPIDURAL INJECTION N/A 3/17/2016    Procedure: BLOCK / INJECTION LUMBAR  L4-5  (C-ARM);   Surgeon: Jaziel Dahl MD;  Location: [facility] MAIN OR;  Service:        Family History   Problem Relation Age of Onset    Cancer Mother 80    Heart disease Mother     Hypertension Mother     Heart disease Father     Diabetes Neg Hx     Stroke Neg Hx        Social History     Occupational History    Occupation: RETIRED   Tobacco Use    Smoking status: Former Smoker     Packs/day: 1 00     Years: 50 00     Pack years: 50 00     Types: Cigarettes     Last attempt to quit: 3/27/2019     Years since quittin 4    Smokeless tobacco: Never Used    Tobacco comment: quit 2 weeks ago   Substance and Sexual Activity    Alcohol use: Never     Frequency: Never    Drug use: No    Sexual activity: Not on file         Current Outpatient Medications:     albuterol (2 5 mg/3 mL) 0 083 % nebulizer solution, Take 1 vial (2 5 mg total) by nebulization every 6 (six) hours as needed for wheezing or shortness of breath, Disp: 120 vial, Rfl: 0    albuterol (VENTOLIN HFA) 90 mcg/act inhaler, Inhale 2 puffs every 6 (six) hours as needed for wheezing, Disp: 1 Inhaler, Rfl: 6    allopurinol (ZYLOPRIM) 100 mg tablet, Take 2 tablets (200 mg total) by mouth daily, Disp: 60 tablet, Rfl: 5    ascorbic acid (VITAMIN C) 500 mg tablet, Take 500 mg by mouth daily  , Disp: , Rfl:     B Complex Vitamins (B COMPLEX 1 PO), Take 1 tablet by mouth daily  , Disp: , Rfl:     Biotin (BIOTIN 5000) 5 MG CAPS, Take 1,000 mcg by mouth daily  , Disp: , Rfl:     calcium carbonate-vitamin D (OSCAL-D) 500 mg-200 units per tablet, Take 2 tablets by mouth daily at bedtime  , Disp: , Rfl:     clopidogrel (PLAVIX) 75 mg tablet, Take 1 tablet (75 mg total) by mouth daily, Disp: 90 tablet, Rfl: 3    colchicine (COLCRYS) 0 6 mg tablet, Take 1 tablet (0 6 mg total) by mouth 2 (two) times a day Take twice daily x 7 days as needed for gout, Disp: 30 tablet, Rfl: 5    Cranberry 1000 MG CAPS, Take 1 tablet by mouth 2 (two) times a day , Disp: , Rfl:     cyanocobalamin 1000 MCG tablet, Take 100 mcg by mouth daily, Disp: , Rfl:     dextromethorphan-guaiFENesin (ROBITUSSIN DM)  mg/5 mL syrup, Take 10 mL by mouth every 4 (four) hours as needed for cough, Disp: 118 mL, Rfl: 0    felodipine (PLENDIL) 5 mg 24 hr tablet, Take 1 tablet (5 mg total) by mouth daily, Disp: 90 tablet, Rfl: 3    finasteride (PROSCAR) 5 mg tablet, Take 1 tablet (5 mg total) by mouth daily, Disp: 90 tablet, Rfl: 3    Flaxseed, Linseed, (EQL FLAX SEED OIL) 1000 MG CAPS, Take by mouth daily  , Disp: , Rfl:     fluocinonide (LIDEX) 0 05 % cream, Apply topically 2 (two) times a day for 90 days, Disp: 120 g, Rfl: 2    fluticasone-salmeterol (ADVAIR) 250-50 mcg/dose inhaler, Inhale 1 puff 2 (two) times a day Rinse mouth after use , Disp: 1 Inhaler, Rfl: 6    fluticasone-vilanterol (BREO ELLIPTA) 100-25 mcg/inh inhaler, Inhale 1 puff daily Rinse mouth after use , Disp: 3 Inhaler, Rfl: 3    furosemide (LASIX) 40 mg tablet, Take 1 tablet (40 mg total) by mouth 2 (two) times a day, Disp: 180 tablet, Rfl: 3    gabapentin (NEURONTIN) 800 mg tablet, Take 1 tablet (800 mg total) by mouth 3 (three) times a day, Disp: 270 tablet, Rfl: 0    glucosamine-chondroitin 500-400 MG tablet, Take 1 tablet by mouth 2 (two) times a day , Disp: , Rfl:     ipratropium-albuterol (DUO-NEB) 0 5-2 5 mg/3 mL nebulizer solution, Take 1 vial (3 mL total) by nebulization every 4 (four) hours as needed for wheezing or shortness of breath, Disp: , Rfl: 0    ipratropium-albuterol (DUO-NEB) 0 5-2 5 mg/3 mL nebulizer solution, Take 1 vial (3 mL total) by nebulization every 6 (six) hours, Disp: 60 vial, Rfl: 10    ipratropium-albuterol (DUO-NEB) 0 5-2 5 mg/3 mL nebulizer solution, Take 1 vial (3 mL total) by nebulization 3 (three) times a day, Disp: 270 mL, Rfl: 1    lactase (LACTAID) 3,000 units tablet, Take 3,000 Units by mouth as needed  , Disp: , Rfl:     Omega-3 Fatty Acids (FISH OIL) 1,000 mg, Take 1,000 mg by mouth 2 (two) times a day , Disp: , Rfl:     PARoxetine (PAXIL) 20 mg tablet, TAKE 1/2 TABLET BY MOUTH DAILY, Disp: 30 tablet, Rfl: 0    psyllium (METAMUCIL) 58 6 % powder, Take 1 packet by mouth daily as needed , Disp: , Rfl:     rivaroxaban (XARELTO) 15 mg tablet, Take 1 tablet (15 mg total) by mouth daily with breakfast (Patient taking differently: Take 20 mg by mouth daily with breakfast ), Disp: , Rfl:     simvastatin (ZOCOR) 20 mg tablet, Take 1 tablet (20 mg total) by mouth daily at bedtime, Disp: 90 tablet, Rfl: 3    traMADol (ULTRAM) 50 mg tablet, Take 50 mg by mouth every 6 (six) hours as needed for moderate pain, Disp: , Rfl:     No Known Allergies    Physical Exam:    Ht 6' 1" (1 854 m)   Wt (!) 158 kg (347 lb 12 8 oz)   BMI 45 89 kg/m²     Constitutional: obese  Eyes: anicteric  HEENT: grossly intact  Neck: supple, symmetric, trachea midline and no masses   Pulmonary:even and unlabored  Cardiovascular:No edema or pitting edema present  Skin:Normal without rashes or lesions and well hydrated  Psychiatric:Mood and affect appropriate  Neurologic:Cranial Nerves II-XII grossly intact  Musculoskeletal:antalgic and ambulates with cane

## 2019-08-28 ENCOUNTER — CLINICAL SUPPORT (OUTPATIENT)
Dept: CARDIAC REHAB | Facility: CLINIC | Age: 79
End: 2019-08-28
Payer: MEDICARE

## 2019-08-28 ENCOUNTER — DOCUMENTATION (OUTPATIENT)
Dept: NEPHROLOGY | Facility: CLINIC | Age: 79
End: 2019-08-28

## 2019-08-28 DIAGNOSIS — Z95.5 STATUS POST PRIMARY ANGIOPLASTY WITH CORONARY STENT: Chronic | ICD-10-CM

## 2019-08-28 PROCEDURE — 93798 PHYS/QHP OP CAR RHAB W/ECG: CPT

## 2019-08-28 NOTE — TELEPHONE ENCOUNTER
Left message for the patient to call back in regards to Dr Timi Lepe instructions      Sarai Rivero MA

## 2019-08-28 NOTE — PROGRESS NOTES
Pt called back and Dr Sam Dominguez recommendations were given  Pt will call back next week with his daily weights

## 2019-08-28 NOTE — TELEPHONE ENCOUNTER
Ok with increase in diuretics  Please have him check daily weights and call back next week with all his weights

## 2019-08-29 NOTE — TELEPHONE ENCOUNTER
Spoke with the patient and he is aware that it is ok to follow PCP recommendations and to monitor his weights daily and call them into us      Sean Us MA

## 2019-08-30 ENCOUNTER — CLINICAL SUPPORT (OUTPATIENT)
Dept: CARDIAC REHAB | Facility: CLINIC | Age: 79
End: 2019-08-30
Payer: MEDICARE

## 2019-08-30 DIAGNOSIS — Z95.5 STATUS POST PRIMARY ANGIOPLASTY WITH CORONARY STENT: Chronic | ICD-10-CM

## 2019-08-30 PROCEDURE — 93798 PHYS/QHP OP CAR RHAB W/ECG: CPT

## 2019-09-01 ENCOUNTER — HOSPITAL ENCOUNTER (EMERGENCY)
Facility: HOSPITAL | Age: 79
Discharge: HOME | End: 2019-09-01
Attending: EMERGENCY MEDICINE
Payer: MEDICARE

## 2019-09-01 VITALS
TEMPERATURE: 100 F | WEIGHT: 315 LBS | DIASTOLIC BLOOD PRESSURE: 71 MMHG | OXYGEN SATURATION: 96 % | RESPIRATION RATE: 18 BRPM | HEART RATE: 70 BPM | SYSTOLIC BLOOD PRESSURE: 125 MMHG | BODY MASS INDEX: 44.46 KG/M2

## 2019-09-01 DIAGNOSIS — S00.511A ABRASION OF LIP, INITIAL ENCOUNTER: Primary | ICD-10-CM

## 2019-09-01 PROCEDURE — 99282 EMERGENCY DEPT VISIT SF MDM: CPT

## 2019-09-01 NOTE — ED PROVIDER NOTES
History  Chief Complaint   Patient presents with    Mouth Lesions     after using his nebulizer he noticed blood on the mouth piece  states it was bleeding for a couple of hours  79-year-old male currently on Plavix presents with bleeding from his tongue after using his nebulizer  Patient says he use empty bags but continued to bleed  Currently not bleeding  No other injuries or complaints did not bite his tongue no trauma  No nose bleed      History provided by:  Patient   used: No        Prior to Admission Medications   Prescriptions Last Dose Informant Patient Reported? Taking? B Complex Vitamins (B COMPLEX 1 PO)  Child Yes No   Sig: Take 1 tablet by mouth daily  Biotin (BIOTIN 5000) 5 MG CAPS  Child Yes No   Sig: Take 1,000 mcg by mouth daily  Cranberry 1000 MG CAPS  Child Yes No   Sig: Take 1 tablet by mouth 2 (two) times a day  Flaxseed, Linseed, (EQL FLAX SEED OIL) 1000 MG CAPS  Child Yes No   Sig: Take by mouth daily  Omega-3 Fatty Acids (FISH OIL) 1,000 mg  Child Yes No   Sig: Take 1,000 mg by mouth 2 (two) times a day  PARoxetine (PAXIL) 20 mg tablet  Child No No   Sig: TAKE 1/2 TABLET BY MOUTH DAILY   albuterol (2 5 mg/3 mL) 0 083 % nebulizer solution  Child No No   Sig: Take 1 vial (2 5 mg total) by nebulization every 6 (six) hours as needed for wheezing or shortness of breath   albuterol (VENTOLIN HFA) 90 mcg/act inhaler   No No   Sig: Inhale 2 puffs every 6 (six) hours as needed for wheezing   allopurinol (ZYLOPRIM) 100 mg tablet  Child No No   Sig: Take 2 tablets (200 mg total) by mouth daily   ascorbic acid (VITAMIN C) 500 mg tablet  Child Yes No   Sig: Take 500 mg by mouth daily  calcium carbonate-vitamin D (OSCAL-D) 500 mg-200 units per tablet  Child Yes No   Sig: Take 2 tablets by mouth daily at bedtime     clopidogrel (PLAVIX) 75 mg tablet  Child No No   Sig: Take 1 tablet (75 mg total) by mouth daily   colchicine (COLCRYS) 0 6 mg tablet   No No   Sig: Take 1 tablet (0 6 mg total) by mouth 2 (two) times a day Take twice daily x 7 days as needed for gout   cyanocobalamin 1000 MCG tablet  Child Yes No   Sig: Take 100 mcg by mouth daily   dextromethorphan-guaiFENesin (ROBITUSSIN DM)  mg/5 mL syrup  Child No No   Sig: Take 10 mL by mouth every 4 (four) hours as needed for cough   felodipine (PLENDIL) 5 mg 24 hr tablet  Child No No   Sig: Take 1 tablet (5 mg total) by mouth daily   finasteride (PROSCAR) 5 mg tablet  Child No No   Sig: Take 1 tablet (5 mg total) by mouth daily   fluocinonide (LIDEX) 0 05 % cream   No No   Sig: Apply topically 2 (two) times a day for 90 days   fluticasone-salmeterol (ADVAIR) 250-50 mcg/dose inhaler  Child No No   Sig: Inhale 1 puff 2 (two) times a day Rinse mouth after use  fluticasone-vilanterol (BREO ELLIPTA) 100-25 mcg/inh inhaler   No No   Sig: Inhale 1 puff daily Rinse mouth after use  furosemide (LASIX) 40 mg tablet   No No   Sig: Take 1 tablet (40 mg total) by mouth 2 (two) times a day   gabapentin (NEURONTIN) 800 mg tablet   No No   Sig: Take 1 tablet (800 mg total) by mouth 3 (three) times a day   glucosamine-chondroitin 500-400 MG tablet  Child Yes No   Sig: Take 1 tablet by mouth 2 (two) times a day  ipratropium-albuterol (DUO-NEB) 0 5-2 5 mg/3 mL nebulizer solution  Child No No   Sig: Take 1 vial (3 mL total) by nebulization every 4 (four) hours as needed for wheezing or shortness of breath   ipratropium-albuterol (DUO-NEB) 0 5-2 5 mg/3 mL nebulizer solution  Child No No   Sig: Take 1 vial (3 mL total) by nebulization every 6 (six) hours   ipratropium-albuterol (DUO-NEB) 0 5-2 5 mg/3 mL nebulizer solution   No No   Sig: Take 1 vial (3 mL total) by nebulization 3 (three) times a day   lactase (LACTAID) 3,000 units tablet  Child Yes No   Sig: Take 3,000 Units by mouth as needed     psyllium (METAMUCIL) 58 6 % powder  Child Yes No   Sig: Take 1 packet by mouth daily as needed    rivaroxaban (XARELTO) 15 mg tablet Child No No   Sig: Take 1 tablet (15 mg total) by mouth daily with breakfast   Patient taking differently: Take 20 mg by mouth daily with breakfast    simvastatin (ZOCOR) 20 mg tablet  Child No No   Sig: Take 1 tablet (20 mg total) by mouth daily at bedtime   traMADol (ULTRAM) 50 mg tablet  Child Yes No   Sig: Take 50 mg by mouth every 6 (six) hours as needed for moderate pain      Facility-Administered Medications: None       Past Medical History:   Diagnosis Date    Arthritis     Atrial flutter (HCC)     Chronic kidney disease     stage 3    Coronary artery disease     2 stents    Fluid retention     Gout     Heart failure (HCC)     pacemaker    Hypertension     Pacemaker     Pulmonary emphysema ([facility] [address] )     Radiculopathy     last assessed 1/28/16     Shortness of breath     exertion    Sleep apnea     c pap       Past Surgical History:   Procedure Laterality Date    ANGIOPLASTY      x2 2 stents and then replaced    CARDIAC PACEMAKER PLACEMENT      pacemaker permanent placement dual chamber / last assessed 4/7/14 / implantation     CARDIAC SURGERY      pacemaker    CHOLECYSTECTOMY      CORONARY ANGIOPLASTY WITH STENT PLACEMENT      EPIDURAL BLOCK INJECTION N/A 5/26/2016    Procedure: BLOCK / INJECTION EPIDURAL STEROID LUMBAR  L4-5;  Surgeon: Ion Asher MD;  Location: [place] [address] MAIN OR;  Service:     EPIDURAL BLOCK INJECTION N/A 2/14/2019    Procedure: L4 L5 Lumbar Epidural Steroid Injection;  Surgeon: Ion Asher MD;  Location: [place] [address] MAIN OR;  Service: Pain Management     EYE SURGERY      cataract left    KNEE ARTHROSCOPY W/ MENISCAL REPAIR Left     LUMBAR EPIDURAL INJECTION N/A 3/17/2016    Procedure: BLOCK / INJECTION LUMBAR  L4-5  (C-ARM);   Surgeon: Ion Asher MD;  Location: [facility] MAIN OR;  Service:        Family History   Problem Relation Age of Onset    Cancer Mother 80    Heart disease Mother     Hypertension Mother     Heart disease Father     Diabetes Neg Hx     Stroke Neg Hx      I have reviewed and agree with the history as documented  Social History     Tobacco Use    Smoking status: Former Smoker     Packs/day: 1 00     Years: 50 00     Pack years: 50 00     Types: Cigarettes     Last attempt to quit: 3/27/2019     Years since quittin 4    Smokeless tobacco: Never Used    Tobacco comment: quit 2 weeks ago   Substance Use Topics    Alcohol use: Never     Frequency: Never    Drug use: No        Review of Systems   All other systems reviewed and are negative  Physical Exam  Physical Exam   Constitutional: He is oriented to person, place, and time  He appears well-developed and well-nourished  HENT:   Head: Normocephalic and atraumatic  Small area of abrasion noted on the tongue with no laceration  Bleeding controlled  No blood in the posterior oropharynx  No epistaxis  Eyes: Pupils are equal, round, and reactive to light  EOM are normal    Neck: Normal range of motion  Neck supple  Cardiovascular: Normal rate and regular rhythm  Pulmonary/Chest: Effort normal and breath sounds normal    Abdominal: Soft  Bowel sounds are normal    Musculoskeletal: Normal range of motion  Neurological: He is alert and oriented to person, place, and time  Skin: Skin is warm and dry  Psychiatric: He has a normal mood and affect  Nursing note and vitals reviewed        Vital Signs  ED Triage Vitals [19 1820]   Temperature Pulse Respirations Blood Pressure SpO2   100 °F (37 8 °C) 70 18 125/71 96 %      Temp src Heart Rate Source Patient Position - Orthostatic VS BP Location FiO2 (%)   -- -- -- -- --      Pain Score       No Pain           Vitals:    19 1820   BP: 125/71   Pulse: 70         Visual Acuity      ED Medications  Medications - No data to display    Diagnostic Studies  Results Reviewed     None                 No orders to display              Procedures  Procedures       ED Course                               MDM  Number of Diagnoses or Management Options  Abrasion of lip, initial encounter:   Diagnosis management comments: Patient discharged with appropriate instructions, and follow up  Patient verbalized understanding of instructions, had no further questions at the time of discharge  Patient had stable vital signs, and well appearing at discharge  Patient Progress  Patient progress: stable      Disposition  Final diagnoses:   Abrasion of lip, initial encounter     Time reflects when diagnosis was documented in both MDM as applicable and the Disposition within this note     Time User Action Codes Description Comment    9/1/2019  7:29 PM Heather Sams Add [S00 511A] Abrasion of lip, initial encounter       ED Disposition     ED Disposition Condition Date/Time Comment    Discharge Stable Sun Sep 1, 2019  7:29 PM Jetty Taylor discharge to home/self care              Follow-up Information     Follow up With Specialties Details Why Contact Info Additional Information    Ayleen Belle MD [facility] Medicine Schedule an appointment as soon as possible for a visit   Kenia Patel MEREDITH  [phone]  [phone]  [address] [place] [address] Emergency Department Emergency Medicine  If symptoms worsen [address] [place] [id number]  [phone] [address], [facility]          Discharge Medication List as of 9/1/2019  7:30 PM      CONTINUE these medications which have NOT CHANGED    Details   albuterol (2 5 mg/3 mL) 0 083 % nebulizer solution Take 1 vial (2 5 mg total) by nebulization every 6 (six) hours as needed for wheezing or shortness of breath, Starting Wed 4/24/2019, Normal      albuterol (VENTOLIN HFA) 90 mcg/act inhaler Inhale 2 puffs every 6 (six) hours as needed for wheezing, Starting Mon 7/22/2019, Normal      allopurinol (ZYLOPRIM) 100 mg tablet Take 2 tablets (200 mg total) by mouth daily, Starting Tue 7/9/2019, Normal      ascorbic acid (VITAMIN C) 500 mg tablet Take 500 mg by mouth daily  , Historical Med      B Complex Vitamins (B COMPLEX 1 PO) Take 1 tablet by mouth daily  , Historical Med      Biotin (BIOTIN 5000) 5 MG CAPS Take 1,000 mcg by mouth daily  , Historical Med      calcium carbonate-vitamin D (OSCAL-D) 500 mg-200 units per tablet Take 2 tablets by mouth daily at bedtime  , Historical Med      clopidogrel (PLAVIX) 75 mg tablet Take 1 tablet (75 mg total) by mouth daily, Starting Tue 7/16/2019, Normal      colchicine (COLCRYS) 0 6 mg tablet Take 1 tablet (0 6 mg total) by mouth 2 (two) times a day Take twice daily x 7 days as needed for gout, Starting Mon 7/22/2019, Normal      Cranberry 1000 MG CAPS Take 1 tablet by mouth 2 (two) times a day , Historical Med      cyanocobalamin 1000 MCG tablet Take 100 mcg by mouth daily, Historical Med      dextromethorphan-guaiFENesin (ROBITUSSIN DM)  mg/5 mL syrup Take 10 mL by mouth every 4 (four) hours as needed for cough, Starting Wed 6/26/2019, Normal      felodipine (PLENDIL) 5 mg 24 hr tablet Take 1 tablet (5 mg total) by mouth daily, Starting Tue 7/16/2019, Normal      finasteride (PROSCAR) 5 mg tablet Take 1 tablet (5 mg total) by mouth daily, Starting Tue 7/16/2019, Normal      Flaxseed, Linseed, (EQL FLAX SEED OIL) 1000 MG CAPS Take by mouth daily  , Historical Med      fluocinonide (LIDEX) 0 05 % cream Apply topically 2 (two) times a day for 90 days, Starting Tue 7/23/2019, Until Mon 10/21/2019, Normal      fluticasone-salmeterol (ADVAIR) 250-50 mcg/dose inhaler Inhale 1 puff 2 (two) times a day Rinse mouth after use , Starting Thu 12/20/2018, Normal      fluticasone-vilanterol (BREO ELLIPTA) 100-25 mcg/inh inhaler Inhale 1 puff daily Rinse mouth after use , Starting Wed 7/17/2019, Normal      furosemide (LASIX) 40 mg tablet Take 1 tablet (40 mg total) by mouth 2 (two) times a day, Starting Tue 8/20/2019, Normal      gabapentin (NEURONTIN) 800 mg tablet Take 1 tablet (800 mg total) by mouth 3 (three) times a day, Starting Tue 7/23/2019, Normal      glucosamine-chondroitin 500-400 MG tablet Take 1 tablet by mouth 2 (two) times a day , Historical Med      !! ipratropium-albuterol (DUO-NEB) 0 5-2 5 mg/3 mL nebulizer solution Take 1 vial (3 mL total) by nebulization every 4 (four) hours as needed for wheezing or shortness of breath, Starting Sat 4/13/2019, No Print      !! ipratropium-albuterol (DUO-NEB) 0 5-2 5 mg/3 mL nebulizer solution Take 1 vial (3 mL total) by nebulization every 6 (six) hours, Starting Fri 6/7/2019, Normal      !! ipratropium-albuterol (DUO-NEB) 0 5-2 5 mg/3 mL nebulizer solution Take 1 vial (3 mL total) by nebulization 3 (three) times a day, Starting Wed 7/17/2019, Print      lactase (LACTAID) 3,000 units tablet Take 3,000 Units by mouth as needed , Historical Med      Omega-3 Fatty Acids (FISH OIL) 1,000 mg Take 1,000 mg by mouth 2 (two) times a day , Historical Med      PARoxetine (PAXIL) 20 mg tablet TAKE 1/2 TABLET BY MOUTH DAILY, Normal      psyllium (METAMUCIL) 58 6 % powder Take 1 packet by mouth daily as needed , Historical Med      rivaroxaban (XARELTO) 15 mg tablet Take 1 tablet (15 mg total) by mouth daily with breakfast, Starting Sat 4/13/2019, No Print      simvastatin (ZOCOR) 20 mg tablet Take 1 tablet (20 mg total) by mouth daily at bedtime, Starting Tue 7/16/2019, Normal      traMADol (ULTRAM) 50 mg tablet Take 50 mg by mouth every 6 (six) hours as needed for moderate pain, Historical Med       !! - Potential duplicate medications found  Please discuss with provider  No discharge procedures on file      ED Provider  Electronically Signed by           Timur Friday, DO  09/04/19 [id number]

## 2019-09-01 NOTE — ED NOTES
Pt encouraged to follow up with PCP as soon as possible   Lesion appears to have stopped bleeding at this point      Erik Abbasi RN  09/01/19 1943

## 2019-09-03 ENCOUNTER — CLINICAL SUPPORT (OUTPATIENT)
Dept: CARDIAC REHAB | Facility: CLINIC | Age: 79
End: 2019-09-03
Payer: MEDICARE

## 2019-09-03 ENCOUNTER — PATIENT OUTREACH (OUTPATIENT)
Dept: FAMILY MEDICINE CLINIC | Facility: CLINIC | Age: 79
End: 2019-09-03

## 2019-09-03 DIAGNOSIS — Z95.5 STATUS POST PRIMARY ANGIOPLASTY WITH CORONARY STENT: Chronic | ICD-10-CM

## 2019-09-03 PROCEDURE — 93798 PHYS/QHP OP CAR RHAB W/ECG: CPT

## 2019-09-03 NOTE — PROGRESS NOTES
Speak with Ministerio Roland regarding latest visit for tongue bleed  Has occurred after nebulizer treatment  They applied a tea bag to tongue, as previously directed by ER staff, and brought him to the ER where they used guaze and pressure  I have encouraged he use the  Mask for nebulizer treatments instead of mouthpiece and to call PCP if it occurs again

## 2019-09-04 ENCOUNTER — CLINICAL SUPPORT (OUTPATIENT)
Dept: CARDIAC REHAB | Facility: CLINIC | Age: 79
End: 2019-09-04
Payer: MEDICARE

## 2019-09-04 DIAGNOSIS — Z95.5 STATUS POST PRIMARY ANGIOPLASTY WITH CORONARY STENT: Chronic | ICD-10-CM

## 2019-09-04 PROCEDURE — 93798 PHYS/QHP OP CAR RHAB W/ECG: CPT

## 2019-09-06 ENCOUNTER — CLINICAL SUPPORT (OUTPATIENT)
Dept: CARDIAC REHAB | Facility: CLINIC | Age: 79
End: 2019-09-06
Payer: MEDICARE

## 2019-09-06 DIAGNOSIS — Z95.5 STATUS POST PRIMARY ANGIOPLASTY WITH CORONARY STENT: Chronic | ICD-10-CM

## 2019-09-06 LAB
APPEARANCE UR: CLEAR
BACTERIA URNS QL MICRO: ABNORMAL
BILIRUB UR QL STRIP: NEGATIVE
BUN SERPL-MCNC: 27 MG/DL (ref 8–27)
BUN/CREAT SERPL: 18 (ref 10–24)
CALCIUM SERPL-MCNC: 9.6 MG/DL (ref 8.6–10.2)
CASTS URNS MICRO: ABNORMAL
CASTS URNS QL MICRO: PRESENT /LPF
CHLORIDE SERPL-SCNC: 99 MMOL/L (ref 96–106)
CO2 SERPL-SCNC: 22 MMOL/L (ref 20–29)
COLOR UR: YELLOW
CREAT SERPL-MCNC: 1.47 MG/DL (ref 0.76–1.27)
CREAT UR-MCNC: 126 MG/DL
CRYSTALS URNS MICRO: ABNORMAL
EPI CELLS #/AREA URNS HPF: ABNORMAL /HPF (ref 0–10)
ERYTHROCYTE [DISTWIDTH] IN BLOOD BY AUTOMATED COUNT: 15.7 % (ref 12.3–15.4)
GLUCOSE SERPL-MCNC: 182 MG/DL (ref 65–99)
GLUCOSE UR QL: NEGATIVE
HCT VFR BLD AUTO: 38.5 % (ref 37.5–51)
HGB BLD-MCNC: 13.3 G/DL (ref 13–17.7)
HGB UR QL STRIP: NEGATIVE
KETONES UR QL STRIP: NEGATIVE
LEUKOCYTE ESTERASE UR QL STRIP: ABNORMAL
MAGNESIUM SERPL-MCNC: 2.4 MG/DL (ref 1.6–2.3)
MCH RBC QN AUTO: 30.2 PG (ref 26.6–33)
MCHC RBC AUTO-ENTMCNC: 34.5 G/DL (ref 31.5–35.7)
MCV RBC AUTO: 87 FL (ref 79–97)
MICRO URNS: ABNORMAL
MUCOUS THREADS URNS QL MICRO: PRESENT
NITRITE UR QL STRIP: NEGATIVE
PH UR STRIP: 6 [PH] (ref 5–7.5)
PHOSPHATE SERPL-MCNC: 4 MG/DL (ref 2.5–4.5)
PLATELET # BLD AUTO: 175 X10E3/UL (ref 150–450)
POTASSIUM SERPL-SCNC: 4.6 MMOL/L (ref 3.5–5.2)
PROT UR QL STRIP: NEGATIVE
PROT UR-MCNC: 14.4 MG/DL
PROT/CREAT UR: 114 MG/G CREAT (ref 0–200)
PTH-INTACT SERPL-MCNC: 42 PG/ML (ref 15–65)
RBC # BLD AUTO: 4.41 X10E6/UL (ref 4.14–5.8)
RBC #/AREA URNS HPF: ABNORMAL /HPF (ref 0–2)
SL AMB EGFR AFRICAN AMERICAN: 52 ML/MIN/1.73
SL AMB EGFR NON AFRICAN AMERICAN: 45 ML/MIN/1.73
SODIUM SERPL-SCNC: 144 MMOL/L (ref 134–144)
SP GR UR: 1.02 (ref 1–1.03)
UNIDENT CRYS URNS QL MICRO: PRESENT
URATE SERPL-MCNC: 8 MG/DL (ref 3.7–8.6)
UROBILINOGEN UR STRIP-ACNC: 0.2 MG/DL (ref 0.2–1)
WBC # BLD AUTO: 8.2 X10E3/UL (ref 3.4–10.8)
WBC #/AREA URNS HPF: ABNORMAL /HPF (ref 0–5)

## 2019-09-06 PROCEDURE — 93798 PHYS/QHP OP CAR RHAB W/ECG: CPT

## 2019-09-09 ENCOUNTER — TELEPHONE (OUTPATIENT)
Dept: NEPHROLOGY | Facility: CLINIC | Age: 79
End: 2019-09-09

## 2019-09-09 ENCOUNTER — CLINICAL SUPPORT (OUTPATIENT)
Dept: CARDIAC REHAB | Facility: CLINIC | Age: 79
End: 2019-09-09
Payer: MEDICARE

## 2019-09-09 DIAGNOSIS — Z95.5 STATUS POST PRIMARY ANGIOPLASTY WITH CORONARY STENT: Chronic | ICD-10-CM

## 2019-09-09 PROCEDURE — 93798 PHYS/QHP OP CAR RHAB W/ECG: CPT

## 2019-09-09 NOTE — TELEPHONE ENCOUNTER
----- Message from [facility], Vivek Anna sent at 9/6/2019  3:53 PM EDT -----  Creatinine and electrolytes stable and acceptable

## 2019-09-09 NOTE — TELEPHONE ENCOUNTER
Spoke with the patient's wife and she is aware of his lab test results and she will inform the patient        Elsie Johnson MA

## 2019-09-11 ENCOUNTER — CLINICAL SUPPORT (OUTPATIENT)
Dept: CARDIAC REHAB | Facility: CLINIC | Age: 79
End: 2019-09-11
Payer: MEDICARE

## 2019-09-11 DIAGNOSIS — Z95.5 STATUS POST PRIMARY ANGIOPLASTY WITH CORONARY STENT: Chronic | ICD-10-CM

## 2019-09-11 PROCEDURE — 93798 PHYS/QHP OP CAR RHAB W/ECG: CPT

## 2019-09-13 ENCOUNTER — CLINICAL SUPPORT (OUTPATIENT)
Dept: CARDIAC REHAB | Facility: CLINIC | Age: 79
End: 2019-09-13
Payer: MEDICARE

## 2019-09-13 DIAGNOSIS — Z95.5 STATUS POST PRIMARY ANGIOPLASTY WITH CORONARY STENT: Chronic | ICD-10-CM

## 2019-09-13 PROCEDURE — 93798 PHYS/QHP OP CAR RHAB W/ECG: CPT

## 2019-09-16 ENCOUNTER — CLINICAL SUPPORT (OUTPATIENT)
Dept: CARDIAC REHAB | Facility: CLINIC | Age: 79
End: 2019-09-16
Payer: MEDICARE

## 2019-09-16 DIAGNOSIS — Z95.5 STATUS POST PRIMARY ANGIOPLASTY WITH CORONARY STENT: Chronic | ICD-10-CM

## 2019-09-16 PROCEDURE — 93798 PHYS/QHP OP CAR RHAB W/ECG: CPT

## 2019-09-18 ENCOUNTER — CLINICAL SUPPORT (OUTPATIENT)
Dept: CARDIAC REHAB | Facility: CLINIC | Age: 79
End: 2019-09-18
Payer: MEDICARE

## 2019-09-18 VITALS — HEIGHT: 73 IN | WEIGHT: 315 LBS | BODY MASS INDEX: 41.75 KG/M2

## 2019-09-18 DIAGNOSIS — Z95.5 STATUS POST PRIMARY ANGIOPLASTY WITH CORONARY STENT: Chronic | ICD-10-CM

## 2019-09-18 PROCEDURE — 93798 PHYS/QHP OP CAR RHAB W/ECG: CPT

## 2019-09-19 DIAGNOSIS — J41.8 MIXED SIMPLE AND MUCOPURULENT CHRONIC BRONCHITIS (HCC): ICD-10-CM

## 2019-09-19 RX ORDER — IPRATROPIUM BROMIDE AND ALBUTEROL SULFATE 2.5; .5 MG/3ML; MG/3ML
3 SOLUTION RESPIRATORY (INHALATION) 3 TIMES DAILY
Qty: 270 ML | Refills: 9 | Status: SHIPPED | OUTPATIENT
Start: 2019-09-19 | End: 2020-07-01

## 2019-09-20 ENCOUNTER — CLINICAL SUPPORT (OUTPATIENT)
Dept: CARDIAC REHAB | Facility: CLINIC | Age: 79
End: 2019-09-20
Payer: MEDICARE

## 2019-09-20 DIAGNOSIS — Z95.5 STATUS POST PRIMARY ANGIOPLASTY WITH CORONARY STENT: Chronic | ICD-10-CM

## 2019-09-20 PROCEDURE — 93798 PHYS/QHP OP CAR RHAB W/ECG: CPT

## 2019-09-20 NOTE — PROGRESS NOTES
Cardiac Rehabilitation Plan of Care   Discharge      Today's date: 2019   Visits: 36  Patient name: Nichole Garsia      : 1940  Age: 78 y o  MRN: [id number]  Referring Physician: NAVA Coyle  Provider: Rosa Chambers  Clinician: Trudi Ruffin RN    Dx:   Encounter Diagnosis   Name Primary?  Status post primary angioplasty with coronary stent      Date of onset: 2019      SUMMARY OF PROGRESS:  Mr Josey Rivera completed the 39 sessions of the cardiac rehabilitation program  Telemetry monitor ventricular pace and capture at rest and with exercise  Short of breath with minimal exertion  Poor posture  States he has chronic back pain  His exercise MET level increased from 1 5 to 2 1 MET's  He completes 36-45 minutes of cardiovascular exercise  His endurance improved 20 0% during the 6 minute walk test he increased walking distance from 350 to 420 feet  His lower body strength improved  He increased his chair to stands from 5 to 8 in 30 seconds  [facility] activity survey improved 9 1% increased MET's from 3 94 to 4 3  The [facility] Quality of Life score improved from 22 to 20  With improvement in quality of life and change in health  His weight decreased 5 5% from 360 8 to 341 pounds a decrease of 19 8 pounds  He missed several sessions due to recent hospitalization for pneumonia  We encouraged breathing techniques to conserve energy and proper posture to reduce back discomfort  He stated he noticed an increase in endurance able to walk longer distances prior to fatigue   He plans to continue to be active by joining the fitness center and will continue to follow up       Medication compliance: Yes   Comments: compliant with medications, spouse assist with medication  Fall Risk: Moderate   Comments: short of breath with minimal exertion, ambulates with assist of walker and chronic back discomfort    EKG changes: Ventricular pace and capture      EXERCISE ASSESSMENT and PLAN    Current Exercise Program in Rehab:       Frequency: 3 days/week        Minutes: 34-46         METS: 2 1            HR: 85   RPE: 4-6 RPD 4         Modalities: NuStep        Home Exercise: Type: exercise for lower body with 2 to 5 pound leg weights    Goals: increase endurance and strength to return to normal activities such as mowing the lawn, increase endurance to walk longer than 350 feet in 6 minutes in 12 weeks, learn to breathing techniques to conserve energy  (Met Goal)  Education: RPE scale , staying active in warm weather tips  Plan:education on home exercise guidelines  Readiness to change: Preparation      NUTRITION ASSESSMENT AND PLAN    Weight control:    Starting weight: 360 5   Current weight:     Waist circumference:    Startin   Current:    Diabetes: N/A  Lipid management: Discussed diet and lipid management and Last lipid profile 123  Chol 123    HDL 38  LDL 62  Goals:continue to decrease weight with increase activity and consuming a heart healthy diet, my plate(met goal)  Education: heart healthy eating, reading food labels, the benefits of losing body fat and healthy fat choices  Healthy summer food choices  Progressing: In Progress  Plan: states he is eating healthy at this time scored 58 on rate your plate  Readiness to change: Action      PSYCHOSOCIAL ASSESSMENT AND PLAN    Emotional:              0 =No Depression  Self-reported stress level: 0   Social support: Very Good  Goals:  improved positive thoughts of well being and increased energy (met goal)  Education: signs/sxs of depression, benefits of positive support system and depression and CAD, stress and relaxation technigues     Progressing:Goal Met  Plan: Practice relaxation techniques  Readiness to change: Preparation      OTHER CORE COMPONENTS     Tobacco:   Social History     Tobacco Use   Smoking Status Former Smoker    Packs/day: 1 00    Years: 50 00    Pack years: 50 00    Types: Cigarettes    Last attempt to quit: 3/27/2019  Years since quittin 4   Smokeless Tobacco Never Used   Tobacco Comment    quit 2 weeks ago       Tobacco Use Intervention: Referral to tobacco expert:   [place] Quit [place] -QUIT-NOW    Blood pressure:    Restin/62   Exercise: 130/62    Goals: consistent BP < 130/80 and Abstain from smoking (met goal)  Education:  understanding HTN and CAD, common hear medications  Progressing: In Progress  Plan: continue to cease smoking, consume a heart healthy diet and take medications as prescribed  Readiness to change: Action

## 2019-09-20 NOTE — PROGRESS NOTES
[address]    1940     Risk: moderate     Pre Post % Change Goal   Date: 6/6/2019 9/18/2019     Physical       Sub Max ETT (mets)    10% increase   6MWT (feet) 350 420 20 0% 10% increase   Arm curl 21 18     Chair to stands 5 8     VELEZ Al (est peak O2) 3 94 4 3 9 1%    Peak exercise CR/MD (mets) 1 5 2 1 40 0% 40% increase   Emotional       PHQ9 (> 10 refer to MD) 0 0 0 0% 4 pt decrease   [place] (lower score = improvement)       Total 22 20 -9 1% < 27   Feelings 1 1 0 0% < 3   Physical Fitness 5 5 0 0% < 3   Social Support 1 1 0 0% < 3   Daily Activities 2 2 0 0% < 3   Social Activities 1 1 0 0% < 3   Pain 4 4 0 0% < 3   Overall Health 3 3 0 0% < 3   Quality of Life 3 2 -33 3% < 3   Change in Health 2 1 -50 0% < 3   Dietary       Rate your plate 58 60 0 3% > 58   Measurements       Weight 360 5 341 -5 5% 2 5 - 5%   BMI 47 6 45 -5 5% 19 - 25   Waist Circ  66 62 5 -5 3% < 40 M / < 35 F   % Body fat 45 6 44 7 -2 0% < 25 M / < 33 F   BP left arm               (systolic) 613 618 23 8% < 181   (diastolic) 56 62 10 4% < 90   Smoking #/day  (if applicable) 0 0 2 2% 0   Lipids/Glucose (Date) 5/13/19      Total cholesterol 123   50 - 200   Triglycerides 117   < 150   HDL 38   40 - 60   LDL 62   < 100   A1C    4 0 - 5 6%   Fasting

## 2019-09-23 ENCOUNTER — PATIENT OUTREACH (OUTPATIENT)
Dept: FAMILY MEDICINE CLINIC | Facility: CLINIC | Age: 79
End: 2019-09-23

## 2019-09-23 ENCOUNTER — APPOINTMENT (OUTPATIENT)
Dept: CARDIAC REHAB | Facility: CLINIC | Age: 79
End: 2019-09-23
Payer: MEDICARE

## 2019-09-23 NOTE — PROGRESS NOTES
Jenna Barber has finished cardiac rehab and lost 20 lbs  His plan is to buy a piece of gym equipment and resume activities in this manner  I encourage he use the coupon he has and join the gym  Jenna Barber will go there tomorrow  I explain BPCI episode is ending  I will read his notes and maybe provided contact in 3 months if needed  I will close at this time if possible  Jenna Barber is aware that he or his family members may call me at any time

## 2019-09-26 ENCOUNTER — OFFICE VISIT (OUTPATIENT)
Dept: NEPHROLOGY | Facility: CLINIC | Age: 79
End: 2019-09-26
Payer: MEDICARE

## 2019-09-26 VITALS
DIASTOLIC BLOOD PRESSURE: 70 MMHG | HEIGHT: 73 IN | WEIGHT: 315 LBS | BODY MASS INDEX: 41.75 KG/M2 | SYSTOLIC BLOOD PRESSURE: 124 MMHG | HEART RATE: 68 BPM

## 2019-09-26 DIAGNOSIS — R60.0 BILATERAL LEG EDEMA: ICD-10-CM

## 2019-09-26 DIAGNOSIS — I70.209 PERIPHERAL ARTERIOSCLEROSIS (HCC): ICD-10-CM

## 2019-09-26 DIAGNOSIS — M54.16 RADICULOPATHY OF LUMBAR REGION: ICD-10-CM

## 2019-09-26 DIAGNOSIS — I12.9 BENIGN HYPERTENSION WITH CHRONIC KIDNEY DISEASE, STAGE III (HCC): ICD-10-CM

## 2019-09-26 DIAGNOSIS — I48.20 CHRONIC ATRIAL FIBRILLATION (HCC): ICD-10-CM

## 2019-09-26 DIAGNOSIS — D64.9 ANEMIA, UNSPECIFIED TYPE: ICD-10-CM

## 2019-09-26 DIAGNOSIS — R80.1 PERSISTENT PROTEINURIA: ICD-10-CM

## 2019-09-26 DIAGNOSIS — N18.30 CKD (CHRONIC KIDNEY DISEASE) STAGE 3, GFR 30-59 ML/MIN (HCC): Primary | ICD-10-CM

## 2019-09-26 DIAGNOSIS — N18.30 BENIGN HYPERTENSION WITH CHRONIC KIDNEY DISEASE, STAGE III (HCC): ICD-10-CM

## 2019-09-26 PROCEDURE — 99214 OFFICE O/P EST MOD 30 MIN: CPT | Performed by: PHYSICIAN ASSISTANT

## 2019-09-26 RX ORDER — FUROSEMIDE 40 MG/1
40 TABLET ORAL 2 TIMES DAILY
Qty: 180 TABLET | Refills: 3 | Status: SHIPPED | OUTPATIENT
Start: 2019-09-26 | End: 2020-03-16 | Stop reason: SDUPTHER

## 2019-09-26 RX ORDER — GABAPENTIN 800 MG/1
800 TABLET ORAL 2 TIMES DAILY
Qty: 270 TABLET | Refills: 0 | Status: SHIPPED | OUTPATIENT
Start: 2019-09-26 | End: 2019-10-18

## 2019-09-26 NOTE — PROGRESS NOTES
Assessment and Plan:    Thomas Escalona was seen today for follow-up  Diagnoses and all orders for this visit:    CKD (chronic kidney disease) stage 3, GFR 30-59 ml/min ([facility])  -     Basic metabolic panel; Future  -     Phosphorus; Future  -     Magnesium; Future  -     Vitamin D 25 hydroxy; Future  -     PTH, intact; Future  -     Protein / creatinine ratio, urine; Future  -     Urinalysis with microscopic; Future    Benign hypertension with chronic kidney disease, stage III ([facility])    Persistent proteinuria    Bilateral leg edema    Chronic atrial fibrillation  -     furosemide (LASIX) 40 mg tablet; Take 1 tablet (40 mg total) by mouth 2 (two) times a day    Peripheral arteriosclerosis (HCC)  -     gabapentin (NEURONTIN) 800 mg tablet; Take 1 tablet (800 mg total) by mouth 2 (two) times a day    Radiculopathy of lumbar region  -     gabapentin (NEURONTIN) 800 mg tablet; Take 1 tablet (800 mg total) by mouth 2 (two) times a day    Anemia, unspecified type  -     CBC; Future  -     Ferritin; Future  -     Iron Saturation %; Future        Chronic Kidney Disease stage III- Baseline creatinine is around 1 5 since the start of 2019 due to hypertensive nephrosclerosis  Creatinine is stable  Make sure you are drinking enough fluids  Of note, patient takes high dose gabapentin but is decreasing it today from TID to BID to see if it makes a difference  Proteinuria- felt secondary to hypertension  Check UPC ratio  Hypertension- Antihypertensive regimen includes felodipine 5mg daily and lasix 40mg twice a day  Avoid salt in your diet  Stay as active as possible  Avoid nonsteroidal medications  Anemia- will check CBC and iron studies before next visit  LE edema- He takes lasix 40mg twice a day  His weight in May was 356-358 pounds  He has lost another 20 pounds and this morning at home he was 338  5 pounds  Urine output is great between 80-100oz per day and sometimes up to 120oz      Bone Mineral Disorder- Will check before next visit  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 200mg daily  Hematuria- continue to follow with urology    Follow up with Dr Marline Macedo in 4 months  Please call the office with any questions or concerns  Colonoscopy: over 25-30 years ago, no symptoms or problems at this time    Reason for Visit: Follow-up    HPI: Earnest Thornton is a 78 y o  male who is here for follow up of chronic kidney disease  He last saw Dr Marline Macedo in May  The patient had one hospitalization in June due to pneumonia  The patient had a gout attack in July when he took a week of colchicine  Otherwise, he has chronic pain in his back and legs  He denies acute shortness of breath  His wife and daughter monitor his diet and are strict with potassium in take and fluid restriction  He continues to lose weight  He ended cardiac rehab this week but is hoping to continue exercising at home on his own  They may purchase a rowing machine  ROS: A complete review of systems was performed and was negative unless otherwise noted in the history of present illness  Allergies:   Patient has no known allergies  Medications:     Current Outpatient Medications:     albuterol (VENTOLIN HFA) 90 mcg/act inhaler, Inhale 2 puffs every 6 (six) hours as needed for wheezing, Disp: 1 Inhaler, Rfl: 6    allopurinol (ZYLOPRIM) 100 mg tablet, Take 2 tablets (200 mg total) by mouth daily, Disp: 60 tablet, Rfl: 5    ascorbic acid (VITAMIN C) 500 mg tablet, Take 500 mg by mouth daily  , Disp: , Rfl:     B Complex Vitamins (B COMPLEX 1 PO), Take 1 tablet by mouth daily  , Disp: , Rfl:     Biotin (BIOTIN 5000) 5 MG CAPS, Take 1,000 mcg by mouth daily  , Disp: , Rfl:     calcium carbonate-vitamin D (OSCAL-D) 500 mg-200 units per tablet, Take 2 tablets by mouth daily at bedtime  , Disp: , Rfl:     clopidogrel (PLAVIX) 75 mg tablet, Take 1 tablet (75 mg total) by mouth daily, Disp: 90 tablet, Rfl: 3    colchicine (COLCRYS) 0 6 mg tablet, Take 1 tablet (0 6 mg total) by mouth 2 (two) times a day Take twice daily x 7 days as needed for gout, Disp: 30 tablet, Rfl: 5    Cranberry 1000 MG CAPS, Take 1 tablet by mouth 2 (two) times a day , Disp: , Rfl:     cyanocobalamin 1000 MCG tablet, Take 100 mcg by mouth daily, Disp: , Rfl:     felodipine (PLENDIL) 5 mg 24 hr tablet, Take 1 tablet (5 mg total) by mouth daily, Disp: 90 tablet, Rfl: 3    finasteride (PROSCAR) 5 mg tablet, Take 1 tablet (5 mg total) by mouth daily, Disp: 90 tablet, Rfl: 3    Flaxseed, Linseed, (EQL FLAX SEED OIL) 1000 MG CAPS, Take by mouth daily  , Disp: , Rfl:     fluocinonide (LIDEX) 0 05 % cream, Apply topically 2 (two) times a day for 90 days, Disp: 120 g, Rfl: 2    fluticasone-salmeterol (ADVAIR) 250-50 mcg/dose inhaler, Inhale 1 puff 2 (two) times a day Rinse mouth after use , Disp: 1 Inhaler, Rfl: 6    fluticasone-vilanterol (BREO ELLIPTA) 100-25 mcg/inh inhaler, Inhale 1 puff daily Rinse mouth after use , Disp: 3 Inhaler, Rfl: 3    furosemide (LASIX) 40 mg tablet, Take 1 tablet (40 mg total) by mouth 2 (two) times a day, Disp: 180 tablet, Rfl: 3    gabapentin (NEURONTIN) 800 mg tablet, Take 1 tablet (800 mg total) by mouth 2 (two) times a day, Disp: 270 tablet, Rfl: 0    glucosamine-chondroitin 500-400 MG tablet, Take 1 tablet by mouth 2 (two) times a day , Disp: , Rfl:     ipratropium-albuterol (DUO-NEB) 0 5-2 5 mg/3 mL nebulizer solution, Take 1 vial (3 mL total) by nebulization 3 (three) times a day, Disp: 270 mL, Rfl: 9    lactase (LACTAID) 3,000 units tablet, Take 3,000 Units by mouth as needed  , Disp: , Rfl:     Omega-3 Fatty Acids (FISH OIL) 1,000 mg, Take 1,000 mg by mouth 2 (two) times a day , Disp: , Rfl:     PARoxetine (PAXIL) 20 mg tablet, TAKE 1/2 TABLET BY MOUTH DAILY, Disp: 30 tablet, Rfl: 0    psyllium (METAMUCIL) 58 6 % powder, Take 1 packet by mouth daily as needed , Disp: , Rfl:     rivaroxaban (XARELTO) 15 mg tablet, Take 1 tablet (15 mg total) by mouth daily with breakfast, Disp: , Rfl:     simvastatin (ZOCOR) 20 mg tablet, Take 1 tablet (20 mg total) by mouth daily at bedtime, Disp: 90 tablet, Rfl: 3    traMADol (ULTRAM) 50 mg tablet, Take 50 mg by mouth every 6 (six) hours as needed for moderate pain, Disp: , Rfl:     dextromethorphan-guaiFENesin (ROBITUSSIN DM)  mg/5 mL syrup, Take 10 mL by mouth every 4 (four) hours as needed for cough (Patient not taking: Reported on 9/26/2019), Disp: 118 mL, Rfl: 0    Past Medical History:   Diagnosis Date    Arthritis     Atrial flutter (HCC)     Chronic kidney disease     stage 3    Coronary artery disease     2 stents    Fluid retention     Gout     Heart failure (HCC)     pacemaker    Hypertension     Pacemaker     Pulmonary emphysema ([facility] [address]     Radiculopathy     last assessed 1/28/16     Shortness of breath     exertion    Sleep apnea     c pap     Past Surgical History:   Procedure Laterality Date    ANGIOPLASTY      x2 2 stents and then replaced    CARDIAC PACEMAKER PLACEMENT      pacemaker permanent placement dual chamber / last assessed 4/7/14 / implantation     CARDIAC SURGERY      pacemaker    CHOLECYSTECTOMY      CORONARY ANGIOPLASTY WITH STENT PLACEMENT      EPIDURAL BLOCK INJECTION N/A 5/26/2016    Procedure: BLOCK / INJECTION EPIDURAL STEROID LUMBAR  L4-5;  Surgeon: Francisco Arevalo MD;  Location: [place] [address] MAIN OR;  Service:     EPIDURAL BLOCK INJECTION N/A 2/14/2019    Procedure: L4 L5 Lumbar Epidural Steroid Injection;  Surgeon: Francisco Arevalo MD;  Location: [place] [address] MAIN OR;  Service: Pain Management     EYE SURGERY      cataract left    KNEE ARTHROSCOPY W/ MENISCAL REPAIR Left     LUMBAR EPIDURAL INJECTION N/A 3/17/2016    Procedure: BLOCK / INJECTION LUMBAR  L4-5  (C-ARM);   Surgeon: Francisco Arevalo MD;  Location: [facility] MAIN OR;  Service:      Family History   Problem Relation Age of Onset    Cancer Mother 80    Heart disease Mother     Hypertension Mother  Heart disease Father     Diabetes Neg Hx     Stroke Neg Hx       reports that he quit smoking about 6 months ago  His smoking use included cigarettes  He has a 50 00 pack-year smoking history  He has never used smokeless tobacco  He reports that he does not drink alcohol or use drugs  Physical Exam:   Vitals:    09/26/19 1032 09/26/19 1106   BP:  124/70   BP Location:  Right arm   Patient Position:  Sitting   Cuff Size:  Large   Pulse:  68   Weight: (!) 154 kg (340 lb)    Height: 6' 1" (1 854 m)      Body mass index is 44 86 kg/m²  General: NAD  Neuro: AAO  Neck: supple  Skin: no rash  Cardiac: RRR  Lungs: CTAB  Abdomen: soft nt nd  Extremities: + edema bilaterally    Procedure:  No results found for this or any previous visit  Labs reviewed      Lab Results   Component Value Date    GLUCOSE 129 (H) 12/11/2017    CALCIUM 9 5 06/26/2019     12/11/2017    K 4 6 09/05/2019    CO2 22 09/05/2019    CL 99 09/05/2019    BUN 27 09/05/2019    CREATININE 1 47 (H) 09/05/2019

## 2019-09-26 NOTE — PATIENT INSTRUCTIONS
Chronic Kidney Disease stage III- Baseline creatinine is around 1 5 since the start of 2019 due to hypertensive nephrosclerosis  Creatinine is stable  Make sure you are drinking enough fluids  Of note, patient takes high dose gabapentin  Proteinuria- felt secondary to hypertension  Hypertension- Antihypertensive regimen includes felodipine 5mg daily and lasix 40mg twice a day  Avoid salt in your diet  Stay as active as possible  Avoid nonsteroidal medications  Anemia- will check CBC and iron studies before next visit  LE edema- He takes lasix 40mg twice a day  His weight in May was 356-358 pounds  He has lost another 20 pounds and this morning at home he was 338  5 pounds  Bone Mineral Disorder- Will check before next visit  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 200mg daily  Hematuria- continue to follow with urology    Follow up with Dr Georgie Hayes in 4 months  Please call the office with any questions or concerns

## 2019-10-10 ENCOUNTER — OFFICE VISIT (OUTPATIENT)
Dept: FAMILY MEDICINE CLINIC | Facility: CLINIC | Age: 79
End: 2019-10-10
Payer: MEDICARE

## 2019-10-10 VITALS
SYSTOLIC BLOOD PRESSURE: 134 MMHG | WEIGHT: 315 LBS | DIASTOLIC BLOOD PRESSURE: 62 MMHG | RESPIRATION RATE: 20 BRPM | OXYGEN SATURATION: 97 % | TEMPERATURE: 98.6 F | BODY MASS INDEX: 41.75 KG/M2 | HEART RATE: 70 BPM | HEIGHT: 73 IN

## 2019-10-10 DIAGNOSIS — M54.41 CHRONIC BILATERAL LOW BACK PAIN WITH BILATERAL SCIATICA: ICD-10-CM

## 2019-10-10 DIAGNOSIS — I48.20 CHRONIC A-FIB (HCC): Primary | ICD-10-CM

## 2019-10-10 DIAGNOSIS — N18.30 CKD (CHRONIC KIDNEY DISEASE) STAGE 3, GFR 30-59 ML/MIN (HCC): ICD-10-CM

## 2019-10-10 DIAGNOSIS — K43.9 VENTRAL HERNIA WITHOUT OBSTRUCTION OR GANGRENE: ICD-10-CM

## 2019-10-10 DIAGNOSIS — J44.0 CHRONIC OBSTRUCTIVE PULMONARY DISEASE WITH ACUTE LOWER RESPIRATORY INFECTION (HCC): ICD-10-CM

## 2019-10-10 DIAGNOSIS — Z23 NEED FOR INFLUENZA VACCINATION: ICD-10-CM

## 2019-10-10 DIAGNOSIS — G47.33 OSA (OBSTRUCTIVE SLEEP APNEA): ICD-10-CM

## 2019-10-10 DIAGNOSIS — G89.29 CHRONIC BILATERAL LOW BACK PAIN WITH BILATERAL SCIATICA: ICD-10-CM

## 2019-10-10 DIAGNOSIS — M54.42 CHRONIC BILATERAL LOW BACK PAIN WITH BILATERAL SCIATICA: ICD-10-CM

## 2019-10-10 PROCEDURE — G0008 ADMIN INFLUENZA VIRUS VAC: HCPCS | Performed by: FAMILY MEDICINE

## 2019-10-10 PROCEDURE — 99214 OFFICE O/P EST MOD 30 MIN: CPT | Performed by: FAMILY MEDICINE

## 2019-10-10 PROCEDURE — 90662 IIV NO PRSV INCREASED AG IM: CPT | Performed by: FAMILY MEDICINE

## 2019-10-10 NOTE — PROGRESS NOTES
Subjective:           Problem List Items Addressed This Visit        Respiratory    COPD (chronic obstructive pulmonary disease) ([facility] [address] ) stable cont f/u meds    JOVI (obstructive sleep apnea) on CPAP       Cardiovascular and Mediastinum    Chronic a-fib - Primary reg       Nervous and Auditory    Chronic bilateral low back pain with bilateral sciatica stable       Genitourinary    CKD (chronic kidney disease) stage 3, GFR 30-59 ml/min (HCC) stable hydrate well      Other Visit Diagnoses     Need for influenza vaccination        Relevant Orders    influenza vaccine, [id number], high-dose, PF 0 5 mL (FLUZONE HIGH-DOSE)              Orders Placed This Encounter   Procedures    influenza vaccine, [id number], high-dose, PF 0 5 mL (FLUZONE HIGH-DOSE)       Patient Instructions   Continue medications  Cardiology Pulmonary follow ups  Vaccination update     Continue Diet exercise       miralax 1/2 pk daily 6 oz water    BMI Counseling: Body mass index is 44 96 kg/m²  Discussed the patient's BMI with him  The BMI is above normal  Nutrition recommendations include reducing portion sizes  Joanne Birch    Chief Complaint   Patient presents with    Follow-up     3mo f/u   Marshal Baumgarten is in for evaluation follow-up history of CAD stents CKD pulmonary emphysema sleep apnea CHF pacemaker multiple arthritic complaints lumbar epidural on multiple occasions    /62   Pulse 70   Temp 98 6 °F (37 °C)   Resp 20   Ht 6' 1" (1 854 m)   Wt (!) 155 kg (340 lb 12 8 oz)   SpO2 97%   BMI 44 96 kg/m²       No Known Allergies    Current Outpatient Medications on File Prior to Visit   Medication Sig Dispense Refill    albuterol (VENTOLIN HFA) 90 mcg/act inhaler Inhale 2 puffs every 6 (six) hours as needed for wheezing 1 Inhaler 6    allopurinol (ZYLOPRIM) 100 mg tablet Take 2 tablets (200 mg total) by mouth daily 60 tablet 5    ascorbic acid (VITAMIN C) 500 mg tablet Take 500 mg by mouth daily        B Complex Vitamins (B COMPLEX 1 PO) Take 1 tablet by mouth daily   Biotin (BIOTIN 5000) 5 MG CAPS Take 1,000 mcg by mouth daily   calcium carbonate-vitamin D (OSCAL-D) 500 mg-200 units per tablet Take 2 tablets by mouth daily at bedtime   clopidogrel (PLAVIX) 75 mg tablet Take 1 tablet (75 mg total) by mouth daily 90 tablet 3    colchicine (COLCRYS) 0 6 mg tablet Take 1 tablet (0 6 mg total) by mouth 2 (two) times a day Take twice daily x 7 days as needed for gout 30 tablet 5    Cranberry 1000 MG CAPS Take 1 tablet by mouth 2 (two) times a day   cyanocobalamin 1000 MCG tablet Take 100 mcg by mouth daily      dextromethorphan-guaiFENesin (ROBITUSSIN DM)  mg/5 mL syrup Take 10 mL by mouth every 4 (four) hours as needed for cough 118 mL 0    felodipine (PLENDIL) 5 mg 24 hr tablet Take 1 tablet (5 mg total) by mouth daily 90 tablet 3    finasteride (PROSCAR) 5 mg tablet Take 1 tablet (5 mg total) by mouth daily 90 tablet 3    Flaxseed, Linseed, (EQL FLAX SEED OIL) 1000 MG CAPS Take by mouth daily   fluocinonide (LIDEX) 0 05 % cream Apply topically 2 (two) times a day for 90 days 120 g 2    fluticasone-vilanterol (BREO ELLIPTA) 100-25 mcg/inh inhaler Inhale 1 puff daily Rinse mouth after use  3 Inhaler 3    furosemide (LASIX) 40 mg tablet Take 1 tablet (40 mg total) by mouth 2 (two) times a day 180 tablet 3    gabapentin (NEURONTIN) 800 mg tablet Take 1 tablet (800 mg total) by mouth 2 (two) times a day 270 tablet 0    glucosamine-chondroitin 500-400 MG tablet Take 1 tablet by mouth 2 (two) times a day   ipratropium-albuterol (DUO-NEB) 0 5-2 5 mg/3 mL nebulizer solution Take 1 vial (3 mL total) by nebulization 3 (three) times a day 270 mL 9    lactase (LACTAID) 3,000 units tablet Take 3,000 Units by mouth as needed   Omega-3 Fatty Acids (FISH OIL) 1,000 mg Take 1,000 mg by mouth 2 (two) times a day        PARoxetine (PAXIL) 20 mg tablet TAKE 1/2 TABLET BY MOUTH DAILY 30 tablet 0  psyllium (METAMUCIL) 58 6 % powder Take 1 packet by mouth daily as needed       rivaroxaban (XARELTO) 15 mg tablet Take 1 tablet (15 mg total) by mouth daily with breakfast (Patient taking differently: Take 20 mg by mouth daily with breakfast )      simvastatin (ZOCOR) 20 mg tablet Take 1 tablet (20 mg total) by mouth daily at bedtime 90 tablet 3    traMADol (ULTRAM) 50 mg tablet Take 50 mg by mouth every 6 (six) hours as needed for moderate pain      fluticasone-salmeterol (ADVAIR) 250-50 mcg/dose inhaler Inhale 1 puff 2 (two) times a day Rinse mouth after use  (Patient not taking: Reported on 10/10/2019) 1 Inhaler 6     No current facility-administered medications on file prior to visit          Past Medical History:   Diagnosis Date    Arthritis     Atrial flutter ([facility] [address] [facility]     Chronic kidney disease     stage 3    Coronary artery disease     2 stents    Fluid retention     Gout     Heart failure (HCC)     pacemaker    Hypertension     Pacemaker     Pulmonary emphysema ([facility] [address] )     Radiculopathy     last assessed 1/28/16     Shortness of breath     exertion    Sleep apnea     c pap       Past Surgical History:   Procedure Laterality Date    ANGIOPLASTY      x2 2 stents and then replaced    CARDIAC PACEMAKER PLACEMENT      pacemaker permanent placement dual chamber / last assessed 4/7/14 / implantation     CARDIAC SURGERY      pacemaker    CHOLECYSTECTOMY      CORONARY ANGIOPLASTY WITH STENT PLACEMENT      EPIDURAL BLOCK INJECTION N/A 5/26/2016    Procedure: BLOCK / INJECTION EPIDURAL STEROID LUMBAR  L4-5;  Surgeon: Anibal Sharma MD;  Location: [place] [address] MAIN OR;  Service:     EPIDURAL BLOCK INJECTION N/A 2/14/2019    Procedure: L4 L5 Lumbar Epidural Steroid Injection;  Surgeon: Anibal Sharma MD;  Location: [place] [address] MAIN OR;  Service: Pain Management     EYE SURGERY      cataract left    KNEE ARTHROSCOPY W/ MENISCAL REPAIR Left     LUMBAR EPIDURAL INJECTION N/A 3/17/2016    Procedure: BLOCK / INJECTION LUMBAR  L4-5  (C-ARM); Surgeon: Leah Ramos MD;  Location: [facility] MAIN OR;  Service:           reports that he quit smoking about 6 months ago  His smoking use included cigarettes  He has a 50 00 pack-year smoking history  He has never used smokeless tobacco  He reports that he does not drink alcohol or use drugs  reports that he quit smoking about 6 months ago  His smoking use included cigarettes  He has a 50 00 pack-year smoking history  He has never used smokeless tobacco         Review of Systems   Constitutional: Negative for activity change and fever  HENT: Negative for congestion, nosebleeds and postnasal drip  Respiratory: Positive for cough  Negative for chest tightness and wheezing  Cardiovascular: Positive for leg swelling  Negative for chest pain and palpitations  Gastrointestinal: Negative for abdominal distention and blood in stool  Genitourinary: Negative for enuresis and hematuria  Musculoskeletal: Positive for arthralgias  Neurological: Negative for dizziness, tremors, seizures and syncope  Hematological: Negative for adenopathy  Psychiatric/Behavioral: Positive for behavioral problems  Negative for hallucinations  Physical Exam   Constitutional: He is oriented to person, place, and time  obese   HENT:   glasses   Eyes:   glasses   Neck: Neck supple  No tracheal deviation present  Cardiovascular: Normal rate and regular rhythm  Exam reveals no gallop  Murmur heard  1/6   Pulmonary/Chest: Effort normal and breath sounds normal  No respiratory distress  He has no wheezes  He exhibits no tenderness  Rhonchi clears with cough   Abdominal: Soft  Bowel sounds are normal  A hernia is present  Obese    3- 4cm firm reducible hernia   Musculoskeletal: He exhibits no deformity  Neurological: He is alert and oriented to person, place, and time  He exhibits normal muscle tone  Skin: Capillary refill takes 2 to 3 seconds     Ecchymosis arms hands Psychiatric: He has a normal mood and affect   His behavior is normal

## 2019-10-10 NOTE — PATIENT INSTRUCTIONS
Continue medications  Cardiology Pulmonary follow ups   Vaccination update     Continue Diet exercise       miralax 1/2 pk daily 6 oz water

## 2019-10-16 ENCOUNTER — CONSULT (OUTPATIENT)
Dept: SURGERY | Facility: CLINIC | Age: 79
End: 2019-10-16
Payer: MEDICARE

## 2019-10-16 VITALS
TEMPERATURE: 97.8 F | BODY MASS INDEX: 41.75 KG/M2 | SYSTOLIC BLOOD PRESSURE: 100 MMHG | WEIGHT: 315 LBS | DIASTOLIC BLOOD PRESSURE: 60 MMHG | HEIGHT: 73 IN | HEART RATE: 70 BPM

## 2019-10-16 DIAGNOSIS — G47.33 OSA (OBSTRUCTIVE SLEEP APNEA): ICD-10-CM

## 2019-10-16 DIAGNOSIS — I20.9 ANGINA PECTORIS (HCC): ICD-10-CM

## 2019-10-16 DIAGNOSIS — N18.30 BENIGN HYPERTENSION WITH CHRONIC KIDNEY DISEASE, STAGE III (HCC): ICD-10-CM

## 2019-10-16 DIAGNOSIS — E79.0 HYPERURICEMIA: ICD-10-CM

## 2019-10-16 DIAGNOSIS — K57.30 COLON, DIVERTICULOSIS: ICD-10-CM

## 2019-10-16 DIAGNOSIS — I71.4 ANEURYSM OF ABDOMINAL AORTA (HCC): ICD-10-CM

## 2019-10-16 DIAGNOSIS — R60.0 BILATERAL LEG EDEMA: ICD-10-CM

## 2019-10-16 DIAGNOSIS — J41.8 MIXED SIMPLE AND MUCOPURULENT CHRONIC BRONCHITIS (HCC): ICD-10-CM

## 2019-10-16 DIAGNOSIS — I48.20 CHRONIC ATRIAL FIBRILLATION (HCC): ICD-10-CM

## 2019-10-16 DIAGNOSIS — I25.10 ARTERIOSCLEROSIS OF CORONARY ARTERY: ICD-10-CM

## 2019-10-16 DIAGNOSIS — J44.9 COPD (CHRONIC OBSTRUCTIVE PULMONARY DISEASE) (HCC): ICD-10-CM

## 2019-10-16 DIAGNOSIS — Z95.5 STATUS POST PRIMARY ANGIOPLASTY WITH CORONARY STENT: Chronic | ICD-10-CM

## 2019-10-16 DIAGNOSIS — R32 URINARY INCONTINENCE: ICD-10-CM

## 2019-10-16 DIAGNOSIS — N18.30 CKD (CHRONIC KIDNEY DISEASE) STAGE 3, GFR 30-59 ML/MIN (HCC): ICD-10-CM

## 2019-10-16 DIAGNOSIS — G89.4 CHRONIC PAIN SYNDROME: ICD-10-CM

## 2019-10-16 DIAGNOSIS — K43.9 VENTRAL HERNIA WITHOUT OBSTRUCTION OR GANGRENE: Primary | ICD-10-CM

## 2019-10-16 DIAGNOSIS — I12.9 BENIGN HYPERTENSION WITH CHRONIC KIDNEY DISEASE, STAGE III (HCC): ICD-10-CM

## 2019-10-16 DIAGNOSIS — I10 ESSENTIAL HYPERTENSION: ICD-10-CM

## 2019-10-16 DIAGNOSIS — I50.30 DIASTOLIC CONGESTIVE HEART FAILURE (HCC): ICD-10-CM

## 2019-10-16 DIAGNOSIS — I48.20 CHRONIC A-FIB (HCC): ICD-10-CM

## 2019-10-16 PROCEDURE — 99214 OFFICE O/P EST MOD 30 MIN: CPT | Performed by: SPECIALIST

## 2019-10-16 NOTE — PROGRESS NOTES
History and Physical Examination - General Surgery   [facility]  Ethan Xie 78 y o  male MRN: [id number]  Unit/Bed#:  Encounter: [id number] PCP: Juan Golden MD    History of Present Illness   Chief Complaint:    I have a hernia for more than 10 years  doctor told me that I have hernia and it has increased in size    HPI:  Ethan Xie is a 78 y o  male who presents to office with extensive medical problems  Associated with morbid obesity  CAD/kidney disease/diastolic congestive heart/status post angioplasty/chronic atrial fibrillation/pacemaker  Very little exercise tolerance/short of breath/sleep apnea/bilateral lower extremity edema/anxiety/hypertension/  S/p the open cholecystectomy/patient has lost the some weight recently and is working hard to lose weight  As the small hernia midway between xiphisternum and umbilicus    Denies any pain tenderness the site of hernia  Patient denies any nausea vomiting diarrhea constipation    Patient is here for evaluation of hernia his cardiologist has said that he is not fit for any kind of surgical intervention  He is on Plavix and fish oil and Xarelto anticoagulation    Historical Information   The following portions of the patient's history were reviewed and updated as appropriate  Past Medical History:   Diagnosis Date    Arthritis     Atrial flutter ([address] )     Chronic kidney disease     stage 3    Coronary artery disease     2 stents    Fluid retention     Gout     Heart failure ([address] )     pacemaker    Hypertension     Pacemaker     [facility]monary emph[facility]sema [facility]     Radiculopathy     last assessed 1/28/16     Shortness of breath     exertion    Sleep apnea     c pap     Past Surgical History:   Procedure Laterality Date    ANGIOPLASTY      x2 2 stents and then replaced   [address]      pacemaker permanent placement dual chamber / last assessed 4/7/14 / implantation     CARDIAC SURGERY pacemaker    CHOLECYSTECTOMY      CORONARY ANGIOPLASTY WITH STENT PLACEMENT      EPIDURAL BLOCK INJECTION N/A 2016    Procedure: BLOCK / INJECTION EPIDURAL STEROID LUMBAR  L4-5;  Surgeon: Ralph Elam MD;  Location: [facility] MAIN OR;  Service:     EPIDURAL BLOCK INJECTION N/A 2019    Procedure: L4 L5 Lumbar Epidural Steroid Injection;  Surgeon: Ralph Elam MD;  Location: [facility] MAIN OR;  Service: Pain Management     EYE SURGERY      cataract left    KNEE ARTHROSCOPY W/ MENISCAL REPAIR Left     LUMBAR EPIDURAL INJECTION N/A 3/17/2016    Procedure: BLOCK / INJECTION LUMBAR  L4-5  (C-ARM); Surgeon: Ralph Elam MD;  Location: [facility] MAIN OR;  Service:      Social History   Social History     Substance and Sexual Activity   Alcohol Use Never    Frequency: Never     Social History     Substance and Sexual Activity   Drug Use No     Social History     Tobacco Use   Smoking Status Former Smoker    Packs/day: 1 00    Years: 50 00    Pack years: 50 00    Types: Cigarettes    Last attempt to quit: 3/27/2019    Years since quittin 5   Smokeless Tobacco Never Used   Tobacco Comment    quit 2 weeks ago     Family History:   Family History   Problem Relation Age of Onset    Cancer Mother 80    Heart disease Mother     Hypertension Mother     Heart disease Father     Diabetes Neg Hx     Stroke Neg Hx        Meds/Allergies   No Known Allergies    Current Outpatient Medications:     albuterol (VENTOLIN HFA) 90 mcg/act inhaler, Inhale 2 puffs every 6 (six) hours as needed for wheezing, Disp: 1 Inhaler, Rfl: 6    allopurinol (ZYLOPRIM) 100 mg tablet, Take 2 tablets (200 mg total) by mouth daily, Disp: 60 tablet, Rfl: 5    ascorbic acid (VITAMIN C) 500 mg tablet, Take 500 mg by mouth daily  , Disp: , Rfl:     B Complex Vitamins (B COMPLEX 1 PO), Take 1 tablet by mouth daily  , Disp: , Rfl:     Biotin (BIOTIN 5000) 5 MG CAPS, Take 1,000 mcg by mouth daily  , Disp: , Rfl:     calcium carbonate-vitamin D (OSCAL-D) 500 mg-200 units per tablet, Take 2 tablets by mouth daily at bedtime  , Disp: , Rfl:     clopidogrel (PLAVIX) 75 mg tablet, Take 1 tablet (75 mg total) by mouth daily, Disp: 90 tablet, Rfl: 3    colchicine (COLCRYS) 0 6 mg tablet, Take 1 tablet (0 6 mg total) by mouth 2 (two) times a day Take twice daily x 7 days as needed for gout, Disp: 30 tablet, Rfl: 5    Cranberry 1000 MG CAPS, Take 1 tablet by mouth 2 (two) times a day , Disp: , Rfl:     cyanocobalamin 1000 MCG tablet, Take 100 mcg by mouth daily, Disp: , Rfl:     dextromethorphan-guaiFENesin (ROBITUSSIN DM)  mg/5 mL syrup, Take 10 mL by mouth every 4 (four) hours as needed for cough, Disp: 118 mL, Rfl: 0    felodipine (PLENDIL) 5 mg 24 hr tablet, Take 1 tablet (5 mg total) by mouth daily, Disp: 90 tablet, Rfl: 3    finasteride (PROSCAR) 5 mg tablet, Take 1 tablet (5 mg total) by mouth daily, Disp: 90 tablet, Rfl: 3    Flaxseed, Linseed, (EQL FLAX SEED OIL) 1000 MG CAPS, Take by mouth daily  , Disp: , Rfl:     fluocinonide (LIDEX) 0 05 % cream, Apply topically 2 (two) times a day for 90 days, Disp: 120 g, Rfl: 2    fluticasone-salmeterol (ADVAIR) 250-50 mcg/dose inhaler, Inhale 1 puff 2 (two) times a day Rinse mouth after use , Disp: 1 Inhaler, Rfl: 6    fluticasone-vilanterol (BREO ELLIPTA) 100-25 mcg/inh inhaler, Inhale 1 puff daily Rinse mouth after use , Disp: 3 Inhaler, Rfl: 3    furosemide (LASIX) 40 mg tablet, Take 1 tablet (40 mg total) by mouth 2 (two) times a day, Disp: 180 tablet, Rfl: 3    gabapentin (NEURONTIN) 800 mg tablet, Take 1 tablet (800 mg total) by mouth 2 (two) times a day, Disp: 270 tablet, Rfl: 0    glucosamine-chondroitin 500-400 MG tablet, Take 1 tablet by mouth 2 (two) times a day , Disp: , Rfl:     ipratropium-albuterol (DUO-NEB) 0 5-2 5 mg/3 mL nebulizer solution, Take 1 vial (3 mL total) by nebulization 3 (three) times a day, Disp: 270 mL, Rfl: 9    lactase (LACTAID) 3,000 units tablet, Take 3,000 Units by mouth as needed  , Disp: , Rfl:     Omega-3 Fatty Acids (FISH OIL) 1,000 mg, Take 1,000 mg by mouth 2 (two) times a day , Disp: , Rfl:     PARoxetine (PAXIL) 20 mg tablet, TAKE 1/2 TABLET BY MOUTH DAILY, Disp: 30 tablet, Rfl: 0    psyllium (METAMUCIL) 58 6 % powder, Take 1 packet by mouth daily as needed , Disp: , Rfl:     rivaroxaban (XARELTO) 15 mg tablet, Take 1 tablet (15 mg total) by mouth daily with breakfast (Patient taking differently: Take 20 mg by mouth daily with breakfast ), Disp: , Rfl:     simvastatin (ZOCOR) 20 mg tablet, Take 1 tablet (20 mg total) by mouth daily at bedtime, Disp: 90 tablet, Rfl: 3    traMADol (ULTRAM) 50 mg tablet, Take 50 mg by mouth every 6 (six) hours as needed for moderate pain, Disp: , Rfl:      REVIEW OF SYSTEMS  Constitutional:  Denies fever or chills   Eyes:  Denies change in visual acuity   HENT:  Denies nasal congestion or sore throat   Respiratory:  Chronic history of cough shortness of breath emphysema COPD sleep apnea very little exercise tolerance  Cardiovascular:  Denies chest pain or edema has history of atrial fibrillation and pacemaker placement follows Dr Elliott/Dr Cheryl Wisdom  GI:  Denies abdominal pain, nausea, vomiting, bloody stools or diarrhea   :  Denies dysuria, frequency, difficulty in micturition and nocturia  Musculoskeletal:  Extensive history of bilateral knee back and hip pain  Neurologic:  Denies headache, focal weakness or sensory changes   Endocrine:  Denies polyuria or polydipsia no history of diabetes mellitus  Lymphatic:  Bilateral lower extremity edema  Psychiatric:  Denies depression history of anxiety    Objective   Current Vitals:   /60   Pulse 70   Temp 97 8 °F (36 6 °C)   Ht 6' 1" (1 854 m)   Wt (!) 155 kg (342 lb)   BMI 45 12 kg/m²   Body mass index is 45 12 kg/m²       PHYSICAL EXAMS  General:  Patient is not in acute distress, sitting in the bed comfortably, awake, alert responding to commands,  Morbidly morbid obese slow to move   HEENT:  Both pupils normal-size atraumatic, normocephalic, nonicteric  Neck:  JVP not raised  Trachea central  Respiratory:  normal Breath sounds distant in breath sounds possible emphysema  Cardiovascular:  S1-S2 normal pacemaker in place  GI:  Abdomen soft nontender    Difficult examination secondary to body habitus the scar from open cholecystectomy right subcostal  between the xiphisternum and umbilicus midway approximately 5 x 4 cm swelling which is nontender possible ventral hernia  Not completely reduce possible secondary to fat no signs of infection or inflammation of cellulitis over the cyst  Visible veins secondary to morbid obesity  Musculoskeletal:  No back pain  Integument:  No skin rashes or ulceration some large varicose vein both lower extremity with edema lower extremity  Neurologic:  Patient is awake alert, responding to command, well-oriented to time and place and person moving all extremities ambulating well with difficulty secondary to arthritis    Visit Diagnosis:   Diagnoses and all orders for this visit:    Ventral hernia without obstruction or gangrene  -     Ambulatory referral to General Surgery  -     CT abdomen pelvis w contrast; Future    Colon, diverticulosis  -     CT abdomen pelvis w contrast; Future    COPD (chronic obstructive pulmonary disease) ([facility] )  -     CT abdomen pelvis w contrast; Future    JOVI (obstructive sleep apnea)    Mixed simple and mucopurulent chronic bronchitis (HCC)    Benign hypertension with chronic kidney disease, stage III (HCC)    Chronic atrial fibrillation    Diastolic congestive heart failure (HCC)    Arteriosclerosis of coronary artery    Aneurysm of abdominal aorta (HCC)    Angina pectoris (HCC)    Chronic a-fib    Essential hypertension    CKD (chronic kidney disease) stage 3, GFR 30-59 ml/min (HCC)    Chronic pain syndrome    Status post primary angioplasty with coronary stent    BMI 45 0-49 9, adult [facility])  -     CT abdomen pelvis w contrast; Future    Bilateral leg edema  -     CT abdomen pelvis w contrast; Future    Hyperuricemia    Urinary incontinence       Plan of care was discussed with family wife And patient in detail    Pertinent labs reviewed  Pertinent images and available reads personally reviewed  Procedure: Ct Chest Without Contrast    Result Date: 8/29/2019  Narrative: CT CHEST WITHOUT IV CONTRAST INDICATION:   J18 1: Lobar pneumonia, unspecified organism  COMPARISON:  None  TECHNIQUE: CT examination of the chest was performed without intravenous contrast   Axial, sagittal, and coronal 2D reformatted images were created from the source data and submitted for interpretation  Radiation dose length product (DLP) for this visit:  1225 08 mGy-cm   This examination, like all CT scans performed in the [facility], was performed utilizing techniques to minimize radiation dose exposure, including the use of iterative reconstruction and automated exposure control  FINDINGS: LUNGS:  No nodules or consolidation  There is minimal atelectasis or scarring in the lower lobes medially  Minimal thickening of the right major fissure also noted  There is no tracheal or endobronchial lesion  PLEURA:  Unremarkable  HEART/GREAT VESSELS:  Normal heart size  Pacer leads identified  Coronary artery calcifications and stents noted  There is dense mitral annulus calcification  Normal caliber thoracic aorta with mild atherosclerotic calcifications  MEDIASTINUM AND DEBRA:  Unremarkable  CHEST WALL AND LOWER NECK:   Unremarkable  VISUALIZED STRUCTURES IN THE UPPER ABDOMEN:  There is a small diverticulum arising at the posterior gastric cardia  Status post cholecystectomy  OSSEOUS STRUCTURES:  No acute fracture or destructive osseous lesion  Impression: No active pulmonary disease   Workstation performed: [id number]     Pertinent notes reviewed    Assessment/Plan   Assessment:  Morbid obesity  Chronic atrial fibrillation  Chronic diastolic heart failure  Pacemaker in place  Stage 3 kidney disease  Extensive history of arthritis and gout  Essential hypertension  With the ventral hernia  Plan:  The risks, benefits, alternatives,and probabilities of success were discussed in detail with no guarantee made as to outcome  All questions were answered to the patient's satisfaction  Patient is a very high risk for any kind of surgical intervention unless patient loses considerable amount weight  As per patient his cardiologist does not want any surgical intervention at this very high risk  And patient is here only for evaluation     Will do CT scan of abdominal pelvis with p o  Contrast only  Follow-up in 2 weeks    Counseling / Coordination of Care  Expained patient and family wife in detailed about coordination of care  A description of the counseling / coordination of care:  I performed an interim history, pertinent images and labs, performed a physical examination to arrive at the plan delineated above with associated thought processes  Family member/primary contact updated -  Wife at the bedside    MD Blayne Aguirre    Office   Tel  [phone]  Fax   [phone]

## 2019-10-18 ENCOUNTER — APPOINTMENT (OUTPATIENT)
Dept: RADIOLOGY | Facility: CLINIC | Age: 79
End: 2019-10-18
Payer: MEDICARE

## 2019-10-18 ENCOUNTER — OFFICE VISIT (OUTPATIENT)
Dept: URGENT CARE | Facility: CLINIC | Age: 79
End: 2019-10-18
Payer: MEDICARE

## 2019-10-18 VITALS
BODY MASS INDEX: 41.75 KG/M2 | HEIGHT: 73 IN | HEART RATE: 70 BPM | TEMPERATURE: 99 F | WEIGHT: 315 LBS | DIASTOLIC BLOOD PRESSURE: 69 MMHG | RESPIRATION RATE: 15 BRPM | OXYGEN SATURATION: 95 % | SYSTOLIC BLOOD PRESSURE: 149 MMHG

## 2019-10-18 DIAGNOSIS — R06.02 SHORTNESS OF BREATH: Primary | ICD-10-CM

## 2019-10-18 DIAGNOSIS — R06.02 SHORTNESS OF BREATH: ICD-10-CM

## 2019-10-18 PROCEDURE — 99213 OFFICE O/P EST LOW 20 MIN: CPT | Performed by: PHYSICIAN ASSISTANT

## 2019-10-18 PROCEDURE — 71046 X-RAY EXAM CHEST 2 VIEWS: CPT

## 2019-10-18 RX ORDER — GABAPENTIN 800 MG/1
800 TABLET ORAL DAILY
Qty: 270 TABLET | Refills: 0 | Status: SHIPPED | OUTPATIENT
Start: 2019-10-18 | End: 2019-10-18 | Stop reason: CLARIF

## 2019-10-18 RX ORDER — GABAPENTIN 800 MG/1
800 TABLET ORAL DAILY
COMMUNITY
End: 2020-01-09 | Stop reason: ALTCHOICE

## 2019-10-18 NOTE — PROGRESS NOTES
[facility]        NAME: Mandi Means is a 78 y o  male  : 1940    MRN: [id number]  DATE: 2019  TIME: 11:09 AM    Assessment and Plan   Shortness of breath [R06 02]  1  Shortness of breath  XR chest pa & lateral     Patient Instructions     Discussed with pt that water retention and cough are likely secondary to fluid retention  Discussed that CXR appears consistent with chronic medical conditions, with no acute abnormality  At this time, patient appears stable, thus encouraged pt to continue with treatment plan as he has previously discussed with his GP and nephrologist  He will double his water pill tonight as previously instructed, will hold water to 40 oz daily, continue to use breathing treatment BID and inhalers as previously directed, and continue monitoring of fluid retention/weight increases  Should weight continue to increase, swelling progress into the lower legs, SOB increase or become more frequent, or with worsening sx, he was agreeable to go to the ER  Advised pt to call GP to notify of sx and current treatment plan  He and his wife were agreeable  All questions answered  Precautions given  Chief Complaint     Chief Complaint   Patient presents with    Shortness of Breath     Pt reports of worsening excertional SOB  S/S started approx 2 days ago     History of Present Illness       77 y/o male accompanied by his wife with c/o coughing x up to 5 days  His wife reports coughing fits when he walks, noting a slight increase in this from his normal  His wife states walking if often so painful for him that he holds his breath to complete this, and is then SOB/needing to catch his breath by arrival to destination  Cough is intermittently productive of phlegm  Phlegm last week was yellow, but now seems to be more clear  He reports a single episode of coughing up black mucus last week that "was like a rock"  No recurrence   States he took BiPAP machine off this morning and took pulse ox at 91%, was at 96 before bed  His wife counters this noting he awoke on his side, and that the seal of the mask is often broken when he does this  Notes 95% 02 is typical for him and denies any resting SOB or chest tightness  He reports fatigue and "zero ambition," over the past week, but denies sadness, depression, anger, thoughts of HI or SI  No CP, palpitations, F/C/S, NC, runny nose, ear pain, sore throat, N/V/D  He reports a 5lb weight gain over the past week, which decreased by 1-2 lbs yesterday after he took two water pills yesterday evening  States he has been instructed to take an extra water pill at night when he begins to gain weight, stating he was instructed on this by his kidney doctor  His wife is limiting him to 40 oz of water daily, but he admits that he sometimes sneaks water, and has done so over past few days  Notes his feet have swollen from a usual 11 cm to 11 5 cm last night  Notes bilateral foot swelling, no redness, warmth, mass or injury  He took his water pill this morning  States he has lost 50 lbs in past 6 months  Review of Systems   Review of Systems   Constitutional: Positive for fatigue  Negative for chills and fever  HENT: Negative for congestion, ear pain, postnasal drip, rhinorrhea and sore throat  Eyes: Negative for visual disturbance  Respiratory: Positive for cough  Cardiovascular: Negative for chest pain and palpitations  Gastrointestinal: Negative for abdominal pain, diarrhea, nausea and vomiting  Musculoskeletal: Negative for myalgias  Skin: Negative for color change and pallor  Neurological: Negative for dizziness and headaches       Current Medications       Current Outpatient Medications:     albuterol (VENTOLIN HFA) 90 mcg/act inhaler, Inhale 2 puffs every 6 (six) hours as needed for wheezing, Disp: 1 Inhaler, Rfl: 6    allopurinol (ZYLOPRIM) 100 mg tablet, Take 2 tablets (200 mg total) by mouth daily, Disp: 60 tablet, Rfl: 5   ascorbic acid (VITAMIN C) 500 mg tablet, Take 500 mg by mouth daily  , Disp: , Rfl:     B Complex Vitamins (B COMPLEX 1 PO), Take 1 tablet by mouth daily  , Disp: , Rfl:     Biotin (BIOTIN 5000) 5 MG CAPS, Take 1,000 mcg by mouth daily  , Disp: , Rfl:     calcium carbonate-vitamin D (OSCAL-D) 500 mg-200 units per tablet, Take 2 tablets by mouth daily at bedtime  , Disp: , Rfl:     clopidogrel (PLAVIX) 75 mg tablet, Take 1 tablet (75 mg total) by mouth daily, Disp: 90 tablet, Rfl: 3    colchicine (COLCRYS) 0 6 mg tablet, Take 1 tablet (0 6 mg total) by mouth 2 (two) times a day Take twice daily x 7 days as needed for gout, Disp: 30 tablet, Rfl: 5    Cranberry 1000 MG CAPS, Take 1 tablet by mouth 2 (two) times a day , Disp: , Rfl:     cyanocobalamin 1000 MCG tablet, Take 100 mcg by mouth daily, Disp: , Rfl:     dextromethorphan-guaiFENesin (ROBITUSSIN DM)  mg/5 mL syrup, Take 10 mL by mouth every 4 (four) hours as needed for cough, Disp: 118 mL, Rfl: 0    felodipine (PLENDIL) 5 mg 24 hr tablet, Take 1 tablet (5 mg total) by mouth daily, Disp: 90 tablet, Rfl: 3    finasteride (PROSCAR) 5 mg tablet, Take 1 tablet (5 mg total) by mouth daily, Disp: 90 tablet, Rfl: 3    Flaxseed, Linseed, (EQL FLAX SEED OIL) 1000 MG CAPS, Take by mouth daily  , Disp: , Rfl:     fluocinonide (LIDEX) 0 05 % cream, Apply topically 2 (two) times a day for 90 days, Disp: 120 g, Rfl: 2    fluticasone-vilanterol (BREO ELLIPTA) 100-25 mcg/inh inhaler, Inhale 1 puff daily Rinse mouth after use , Disp: 3 Inhaler, Rfl: 3    furosemide (LASIX) 40 mg tablet, Take 1 tablet (40 mg total) by mouth 2 (two) times a day, Disp: 180 tablet, Rfl: 3    glucosamine-chondroitin 500-400 MG tablet, Take 1 tablet by mouth 2 (two) times a day , Disp: , Rfl:     ipratropium-albuterol (DUO-NEB) 0 5-2 5 mg/3 mL nebulizer solution, Take 1 vial (3 mL total) by nebulization 3 (three) times a day, Disp: 270 mL, Rfl: 9    lactase (LACTAID) 3,000 units tablet, Take 3,000 Units by mouth as needed  , Disp: , Rfl:     Omega-3 Fatty Acids (FISH OIL) 1,000 mg, Take 1,000 mg by mouth 2 (two) times a day , Disp: , Rfl:     PARoxetine (PAXIL) 20 mg tablet, TAKE 1/2 TABLET BY MOUTH DAILY, Disp: 30 tablet, Rfl: 0    psyllium (METAMUCIL) 58 6 % powder, Take 1 packet by mouth daily as needed , Disp: , Rfl:     rivaroxaban (XARELTO) 15 mg tablet, Take 1 tablet (15 mg total) by mouth daily with breakfast (Patient taking differently: Take 20 mg by mouth daily with breakfast ), Disp: , Rfl:     simvastatin (ZOCOR) 20 mg tablet, Take 1 tablet (20 mg total) by mouth daily at bedtime, Disp: 90 tablet, Rfl: 3    traMADol (ULTRAM) 50 mg tablet, Take 50 mg by mouth every 6 (six) hours as needed for moderate pain, Disp: , Rfl:     fluticasone-salmeterol (ADVAIR) 250-50 mcg/dose inhaler, Inhale 1 puff 2 (two) times a day Rinse mouth after use , Disp: 1 Inhaler, Rfl: 6    Current Allergies     Allergies as of 10/18/2019    (No Known Allergies)            The following portions of the patient's history were reviewed and updated as appropriate: allergies, current medications, past family history, past medical history, past social history, past surgical history and problem list      Past Medical History:   Diagnosis Date    Arthritis     Atrial flutter (HCC)     Chronic kidney disease     stage 3    Coronary artery disease     2 stents    Fluid retention     Gout     Heart failure ([facility] )     pacemaker    Hypertension     Pacemaker     Pulmonary emphysema ([facility]     Radiculopathy     last assessed 1/28/16     Shortness of breath     exertion    Sleep apnea     c pap       Past Surgical History:   Procedure Laterality Date    ANGIOPLASTY      x2 2 stents and then replaced   [address]      pacemaker permanent placement dual chamber / last assessed 4/7/14 / implantation     CARDIAC SURGERY      pacemaker    CHOLECYSTECTOMY      CORONARY ANGIOPLASTY WITH STENT PLACEMENT      EPIDURAL BLOCK INJECTION N/A 5/26/2016    Procedure: BLOCK / INJECTION EPIDURAL STEROID LUMBAR  L4-5;  Surgeon: Anshu Nickerson MD;  Location: [place] [address] MAIN OR;  Service:     EPIDURAL BLOCK INJECTION N/A 2/14/2019    Procedure: L4 L5 Lumbar Epidural Steroid Injection;  Surgeon: Anshu Nickerson MD;  Location: [place] [address] MAIN OR;  Service: Pain Management     EYE SURGERY      cataract left    KNEE ARTHROSCOPY W/ MENISCAL REPAIR Left     LUMBAR EPIDURAL INJECTION N/A 3/17/2016    Procedure: BLOCK / INJECTION LUMBAR  L4-5  (C-ARM); Surgeon: Anshu Nickerson MD;  Location: [facility] MAIN OR;  Service:        Family History   Problem Relation Age of Onset    Cancer Mother 80    Heart disease Mother     Hypertension Mother     Heart disease Father     Diabetes Neg Hx     Stroke Neg Hx        Medications have been verified  Objective   /69   Pulse 70   Temp 99 °F (37 2 °C)   Resp 15   Ht 6' 1" (1 854 m)   Wt (!) 156 kg (344 lb)   SpO2 95%   BMI 45 39 kg/m²      CXR: Chronic vascular congestion and cardiomegaly  No acute cardiopulmonary abnormality  Physical Exam     Physical Exam   Constitutional: He is oriented to person, place, and time  Vital signs are normal  He appears well-developed and well-nourished  He is cooperative  He does not appear ill  No distress  HENT:   Head: Normocephalic and atraumatic  Eyes: Conjunctivae and lids are normal  Right eye exhibits no chemosis, no discharge and no exudate  Left eye exhibits no chemosis, no discharge and no exudate  Right conjunctiva is not injected  Left conjunctiva is not injected  Cardiovascular: Normal rate and regular rhythm  PMI is displaced  Exam reveals no gallop, no distant heart sounds and no friction rub  Murmur heard  Systolic murmur is present  Pulmonary/Chest: Effort normal  No stridor  No respiratory distress  He has no decreased breath sounds  He has no wheezes   He has rhonchi (coarse and diffuse, decreasing with deep breathing)  He has no rales  Abdominal: Soft  Bowel sounds are normal  He exhibits no distension and no mass  There is no tenderness  There is no rigidity, no rebound and no guarding  Neurological: He is alert and oriented to person, place, and time  He has normal strength  He is not disoriented  No cranial nerve deficit  He exhibits normal muscle tone  Coordination and gait normal    Skin: Skin is warm, dry and intact  No rash noted  He is not diaphoretic  No erythema  No pallor  Psychiatric: He has a normal mood and affect  His behavior is normal  Judgment and thought content normal    Nursing note and vitals reviewed

## 2019-10-21 ENCOUNTER — TELEPHONE (OUTPATIENT)
Dept: NEPHROLOGY | Facility: CLINIC | Age: 79
End: 2019-10-21

## 2019-10-21 NOTE — TELEPHONE ENCOUNTER
I am assuming this is likely oral contrast   If it is oral contrast then it is okay from renal standpoint  If there is any IV contrast involved, please let me know

## 2019-10-21 NOTE — TELEPHONE ENCOUNTER
Patients wife called asking if it is ok for patient to have barium for a CT of the abdomen and pelvis with contrast  It was ordered by Dr Luc Lockett   It was not scheduled yet as she wanted to check with our office first

## 2019-10-22 NOTE — TELEPHONE ENCOUNTER
Spoke with the patient's wife and she is aware that it ok for him to have a CT with barium        Cara Noble MA

## 2019-10-23 ENCOUNTER — OFFICE VISIT (OUTPATIENT)
Dept: PODIATRY | Facility: CLINIC | Age: 79
End: 2019-10-23
Payer: MEDICARE

## 2019-10-23 VITALS — HEIGHT: 73 IN | BODY MASS INDEX: 41.75 KG/M2 | WEIGHT: 315 LBS | RESPIRATION RATE: 18 BRPM

## 2019-10-23 DIAGNOSIS — M21.969 ACQUIRED DEFORMITY OF ANKLE AND FOOT, UNSPECIFIED LATERALITY: Primary | ICD-10-CM

## 2019-10-23 DIAGNOSIS — M79.672 PAIN IN BOTH FEET: ICD-10-CM

## 2019-10-23 DIAGNOSIS — M54.16 RADICULOPATHY OF LUMBAR REGION: ICD-10-CM

## 2019-10-23 DIAGNOSIS — M77.42 METATARSALGIA OF BOTH FEET: ICD-10-CM

## 2019-10-23 DIAGNOSIS — I70.209 ATHEROSCLEROSIS OF ARTERIES OF EXTREMITIES (HCC): ICD-10-CM

## 2019-10-23 DIAGNOSIS — M79.671 PAIN IN BOTH FEET: ICD-10-CM

## 2019-10-23 DIAGNOSIS — M77.41 METATARSALGIA OF BOTH FEET: ICD-10-CM

## 2019-10-23 PROCEDURE — 99213 OFFICE O/P EST LOW 20 MIN: CPT | Performed by: PODIATRIST

## 2019-10-23 NOTE — PROGRESS NOTES
Assessment/Plan:  Pain upon ambulation   Limb pain   Rule out radiculopathy   Edema secondary to venous insufficiency   Rule out degenerative disc disease   Probable spinal stenosis      Plan   Attempt pain control   Patient is taking gabapentin 800 mg t i d   With some [facility] has breakthrough pain with [facility] was unable to take Medrol Dosepak as directed   Follow up with pain management      No problem-specific Assessment & Plan notes found for this encounter          There are no diagnoses linked to this encounter        Subjective:   Patient has continued ongoing pain in his legs    however it is improved   He rates pain on a scale 3/10  Juan M Husain is seeing pain management  Juan M Ramirezsharlene is slated to have epidural spinal injection           Past Medical History:   Diagnosis Date    Arthritis      Atrial flutter ([facility] [address] [facility]      Chronic kidney disease       stage 3    Coronary artery disease       2 stents    Fluid retention      Gout      Heart failure ([facility] [address] )       pacemaker    Hypertension      Pacemaker      Pulmonary emphysema ([facility] [address] )      Radiculopathy       last assessed 1/28/16     Shortness of breath       exertion    Sleep apnea       c pap                   Past Surgical History:   Procedure Laterality Date    ANGIOPLASTY         x2 2 stents and then replaced    CARDIAC PACEMAKER PLACEMENT         pacemaker permanent placement dual chamber / last assessed 4/7/14 / implantation     CARDIAC SURGERY         pacemaker    CHOLECYSTECTOMY        CORONARY ANGIOPLASTY WITH STENT PLACEMENT        EPIDURAL BLOCK INJECTION N/A 5/26/2016     Procedure: BLOCK / INJECTION EPIDURAL STEROID LUMBAR  L4-5;  Surgeon: Suzanna Mendez MD;  Location: [place] [address] MAIN OR;  Service:     EYE SURGERY         cataract left    KNEE ARTHROSCOPY W/ MENISCAL REPAIR Left      LUMBAR EPIDURAL INJECTION N/A 3/17/2016     Procedure: BLOCK / INJECTION LUMBAR  L4-5  (C-[facility]);  Surgeon: Suzanna Mendez MD;  Location: [place] [address] MAIN OR;  Service:          No Known Allergies        Current Outpatient Prescriptions:     albuterol (VENTOLIN HFA) 90 mcg/act inhaler, Inhale 2 puffs every 6 (six) hours as needed for wheezing, Disp: 1 Inhaler, Rfl: 6    allopurinol (ZYLOPRIM) 100 mg tablet, Take 1 tablet (100 mg total) by mouth daily, Disp: 90 tablet, Rfl: 3    ascorbic acid (VITAMIN C) 500 mg tablet, Take 500 mg by mouth daily  , Disp: , Rfl:     B Complex Vitamins (B COMPLEX 1 PO), Take 1 tablet by mouth daily  , Disp: , Rfl:     Biotin (BIOTIN 5000) 5 MG CAPS, Take 1,000 mcg by mouth daily  , Disp: , Rfl:     calcium carbonate-vitamin D (OSCAL-D) 500 mg-200 units per tablet, Take 2 tablets by mouth daily at bedtime  , Disp: , Rfl:     ciclopirox (LOPROX) 0 77 % cream, Apply topically 2 (two) times a day for 20 days, Disp: 45 g, Rfl: 2    clopidogrel (PLAVIX) 75 mg tablet, TAKE 1 TABLET DAILY, Disp: 90 tablet, Rfl: 3    collagenase (SANTYL) ointment, Apply 1 application topically daily  , Disp: , Rfl:     Cranberry 1000 MG CAPS, Take 1 tablet by mouth 2 (two) times a day , Disp: , Rfl:     cyanocobalamin 1000 MCG tablet, Take 100 mcg by mouth daily, Disp: , Rfl:     felodipine (PLENDIL) 5 mg 24 hr tablet, TAKE 1 TABLET DAILY, Disp: 90 tablet, Rfl: 3    finasteride (PROSCAR) 5 mg tablet, Take 1 tablet (5 mg total) by mouth daily, Disp: 90 tablet, Rfl: 3    Flaxseed, Linseed, (EQL FLAX SEED OIL) 1000 MG CAPS, Take by mouth daily  , Disp: , Rfl:     fluocinonide (LIDEX) 0 05 % cream, Apply topically 2 (two) times a day for 30 days, Disp: 30 g, Rfl: 2    fluticasone-salmeterol (ADVAIR) 250-50 mcg/dose inhaler, Inhale 1 puff 2 (two) times a day Rinse mouth after use , Disp: 1 Inhaler, Rfl: 6    furosemide (LASIX) 40 mg tablet, TAKE 1 TABLET BY MOUTH DAILY AS NEEDED FOR LEG EDEMA OR DYSNEA, Disp: 30 tablet, Rfl: 0    gabapentin (NEURONTIN) 300 mg capsule, TAKE 1 CAPSULE (300 MG TOTAL) BY MOUTH 2 (TWO) TIMES A DAY FOR 30 DAYS, Disp: 60 capsule, Rfl: 2    gabapentin (NEURONTIN) 300 mg capsule, Take 1 capsule (300 mg total) by mouth 3 (three) times a day for 30 days, Disp: 90 capsule, Rfl: 0    gabapentin (NEURONTIN) 600 MG tablet, Take 1 tablet (600 mg total) by mouth 3 (three) times a day for 30 days, Disp: 60 tablet, Rfl: 0    glucosamine-chondroitin 500-400 MG tablet, Take 1 tablet by mouth 2 (two) times a day , Disp: , Rfl:     hydrOXYzine HCL (ATARAX) 25 mg tablet, Take 1 tablet (25 mg total) by mouth 3 (three) times a day for 3 days, Disp: 9 tablet, Rfl: 0    lactase (LACTAID) 3,000 units tablet, Take 3,000 Units by mouth as needed  , Disp: , Rfl:     methylPREDNISolone 4 MG tablet therapy pack, Use as directed on package, Disp: 21 tablet, Rfl: 0    Omega-3 Fatty Acids (FISH OIL) 1,000 mg, Take 1,000 mg by mouth 2 (two) times a day , Disp: , Rfl:     oxyCODONE-acetaminophen (PERCOCET) 5-325 mg per tablet, 1 tablet to be taken 3 times daily as needed for leg pain, Disp: 30 tablet, Rfl: 0    PARoxetine (PAXIL) 20 mg tablet, TAKE 1/2 TABLET BY MOUTH DAILY, Disp: 30 tablet, Rfl: 0    psyllium (METAMUCIL) 58 6 % powder, Take 1 packet by mouth daily Indications: 1 tsp , Disp: , Rfl:     rivaroxaban (XARELTO) tablet, Take 20 mg by mouth , Disp: , Rfl:     simvastatin (ZOCOR) 20 mg tablet, Take 1 tablet (20 mg total) by mouth daily at bedtime, Disp: 90 tablet, Rfl: 3    traMADol (ULTRAM) 50 mg tablet, 1 tablet to be taken twice daily as needed for foot and leg pain, Disp: 30 tablet, Rfl: 0           Patient Active Problem List   Diagnosis    Chronic pain disorder    Bilateral lumbar radiculopathy    Lumbar canal stenosis    Acquired ankle/foot deformity    Pain in joints of both feet    Dermatophytosis    Atherosclerosis of arteries of extremities (HCC)    Pain in both feet    Onychomycosis    Neck muscle spasm    Anxiety 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    Benign hypertension with CKD (chronic kidney disease) stage III ([facility] [address] )    Bilateral leg edema    CKD (chronic kidney disease), stage III (HCC)    Microscopic hematuria    Proteinuria    Urinary frequency    Atrial fibrillation (HCC)    Benign prostatic hyperplasia    Congestive heart failure (HCC)    Arteriosclerosis of coronary artery    Allergic rhinitis    Aneurysm of abdominal aorta (HCC)    Angina pectoris (HCC)    Pain in joint involving ankle and foot    Atrial flutter (HCC)    Carpal tunnel syndrome    Chronic gout without tophus    Chronic low back pain    Chronic obstructive pulmonary disease (HCC)    Colon, diverticulosis    Deep venous insufficiency    Degenerative disc disease, lumbar    Difficulty in walking    Fecal soiling    Fibromyalgia    Fistula-in-ano    Hyperlipidemia    Insomnia, persistent    Memory loss    Moderate or severe vision impairment, one eye    Morbid obesity (HCC)    Nicotine dependence    Osteoarthritis of knee    Pacemaker    Sleep apnea    Urinary incontinence    Venous insufficiency (chronic) (peripheral)    BMI 45 0-49 9, adult (HCC)    Carbuncle of neck    Carbuncle of occipital region of scalp    Elevated glucose    Peripheral arteriosclerosis (HCC)    Radiculopathy of lumbar region    Metatarsalgia of both feet          Physical Exam  Left Foot: Appearance: Normal except as noted: excessive pronation-- and-- pes planus  Second toe deformities include hammer toe  Third toe deformities include hammer toe  Forth toe deformities include hammer toe  Fifth toe deformities include hammer toe  Tenderness: None except the great toe,-- distal first metatarsal,-- distal fifth metatarsal-- and-- insertion of the plantar fascia  Positive Brian sign third left intermetatarsal space  ROM: Full  Motor: Normal   Right Foot: Appearance: Normal except as noted: excessive pronation-- and-- pes planus  Second toe deformities include hammer toe  Third toe deformities include hammer toe   Forth toe deformities include hammer toe  Fifth toe deformities include hammer toe  Evaluation of the great toe nail demonstrates an ingrown nail  Tenderness: None except the insertion of the plantar fascia  Left Ankle: Appearance: Normal except erythema,-- swelling-- and-- swelling laterally  Tenderness: None except the tibiotalar joint  Right Ankle: ROM: Full  Neurological Exam: performed  Deep tendon reflexes: + tinel of right tibial nerve  Vascular Exam: performed Dorsalis pedis pulses were present bilaterally  Posterior tibial pulses were present bilaterally  Elevation Pallor: absent bilaterally  Capillary refill time was less than 1 second bilaterally  Edema: moderate bilaterally-- and-- 4/7 pitting  neg  buddy  Toenails: All of the toenails were elongated,-- hypertrophied,-- discolored,-- ingrown,-- shown to have subungual debris,-- tender-- and-- Mycotic with onychauxis  Hyperkeratosis: present on both first toes,-- present on both first sub metatarsals-- and-- present on both fifth sub metatarsals  Shoe Gear Evaluation: performed ()   Recommendation(s): extra depth diabetic shoes

## 2019-10-28 ENCOUNTER — HOSPITAL ENCOUNTER (OUTPATIENT)
Dept: RADIOLOGY | Facility: HOSPITAL | Age: 79
Discharge: HOME | End: 2019-10-28
Attending: SPECIALIST
Payer: MEDICARE

## 2019-10-28 DIAGNOSIS — K57.30 COLON, DIVERTICULOSIS: ICD-10-CM

## 2019-10-28 DIAGNOSIS — R60.0 BILATERAL LEG EDEMA: ICD-10-CM

## 2019-10-28 DIAGNOSIS — K43.9 VENTRAL HERNIA WITHOUT OBSTRUCTION OR GANGRENE: ICD-10-CM

## 2019-10-28 DIAGNOSIS — J44.9 COPD (CHRONIC OBSTRUCTIVE PULMONARY DISEASE) (HCC): ICD-10-CM

## 2019-10-28 PROCEDURE — 74176 CT ABD & PELVIS W/O CONTRAST: CPT

## 2019-10-30 ENCOUNTER — OFFICE VISIT (OUTPATIENT)
Dept: SURGERY | Facility: CLINIC | Age: 79
End: 2019-10-30
Payer: MEDICARE

## 2019-10-30 VITALS
TEMPERATURE: 97.9 F | BODY MASS INDEX: 41.75 KG/M2 | DIASTOLIC BLOOD PRESSURE: 86 MMHG | HEART RATE: 69 BPM | SYSTOLIC BLOOD PRESSURE: 142 MMHG | HEIGHT: 73 IN | WEIGHT: 315 LBS

## 2019-10-30 DIAGNOSIS — R32 URINARY INCONTINENCE: ICD-10-CM

## 2019-10-30 DIAGNOSIS — I48.20 CHRONIC ATRIAL FIBRILLATION (HCC): ICD-10-CM

## 2019-10-30 DIAGNOSIS — I12.9 BENIGN HYPERTENSION WITH CHRONIC KIDNEY DISEASE, STAGE III (HCC): ICD-10-CM

## 2019-10-30 DIAGNOSIS — Z48.89 ENCOUNTER FOR SURGICAL FOLLOW-UP CARE: Primary | ICD-10-CM

## 2019-10-30 DIAGNOSIS — R26.2 DIFFICULTY IN WALKING: ICD-10-CM

## 2019-10-30 DIAGNOSIS — I50.30 DIASTOLIC CONGESTIVE HEART FAILURE (HCC): ICD-10-CM

## 2019-10-30 DIAGNOSIS — I71.4 ANEURYSM OF ABDOMINAL AORTA (HCC): ICD-10-CM

## 2019-10-30 DIAGNOSIS — I70.209 PERIPHERAL ARTERIOSCLEROSIS (HCC): ICD-10-CM

## 2019-10-30 DIAGNOSIS — M1A.9XX0 CHRONIC GOUT WITHOUT TOPHUS: ICD-10-CM

## 2019-10-30 DIAGNOSIS — N20.0 NEPHROLITHIASIS: ICD-10-CM

## 2019-10-30 DIAGNOSIS — R60.0 BILATERAL LEG EDEMA: ICD-10-CM

## 2019-10-30 DIAGNOSIS — M54.50 LOW BACK PAIN: ICD-10-CM

## 2019-10-30 DIAGNOSIS — I70.209 ATHEROSCLEROSIS OF ARTERIES OF EXTREMITIES (HCC): ICD-10-CM

## 2019-10-30 DIAGNOSIS — N18.30 BENIGN HYPERTENSION WITH CHRONIC KIDNEY DISEASE, STAGE III (HCC): ICD-10-CM

## 2019-10-30 DIAGNOSIS — N18.30 CKD (CHRONIC KIDNEY DISEASE) STAGE 3, GFR 30-59 ML/MIN (HCC): ICD-10-CM

## 2019-10-30 DIAGNOSIS — K57.30 COLON, DIVERTICULOSIS: ICD-10-CM

## 2019-10-30 DIAGNOSIS — I25.10 ARTERIOSCLEROSIS OF CORONARY ARTERY: ICD-10-CM

## 2019-10-30 DIAGNOSIS — I25.10 CAD (CORONARY ARTERY DISEASE): Chronic | ICD-10-CM

## 2019-10-30 DIAGNOSIS — J44.9 COPD (CHRONIC OBSTRUCTIVE PULMONARY DISEASE) (HCC): ICD-10-CM

## 2019-10-30 DIAGNOSIS — M25.579 PAIN IN JOINT INVOLVING ANKLE AND FOOT: ICD-10-CM

## 2019-10-30 DIAGNOSIS — I10 ESSENTIAL HYPERTENSION: ICD-10-CM

## 2019-10-30 DIAGNOSIS — Z95.0 PACEMAKER: ICD-10-CM

## 2019-10-30 DIAGNOSIS — G47.33 OSA (OBSTRUCTIVE SLEEP APNEA): ICD-10-CM

## 2019-10-30 DIAGNOSIS — Z95.5 STATUS POST PRIMARY ANGIOPLASTY WITH CORONARY STENT: Chronic | ICD-10-CM

## 2019-10-30 DIAGNOSIS — E66.01 MORBID OBESITY (HCC): ICD-10-CM

## 2019-10-30 PROCEDURE — 99213 OFFICE O/P EST LOW 20 MIN: CPT | Performed by: SPECIALIST

## 2019-10-30 NOTE — ASSESSMENT & PLAN NOTE
Advised to continue losing weight  Follow-up of a p r n    As per patient his cardiologist this told him no surgery at all considering his cardiac condition  He want to wait and watch

## 2019-10-30 NOTE — PROGRESS NOTES
General Surgery Office Visit Follow up   [facility]  Patient: Ruth Lloyd   : 1940 Sex: male MRN: [id number]   CSN: [id number] PCP: Tha Miller MD    Assessment/ Plan:  Ruth Lloyd is a 78 y o  male  day(s)  follow-up following CT scan of abdominal  For small ventral hernia  Encounter for surgical follow-up care [Z48 89]    Plan  Stable no complaints regarding hernia  Patient has the umbilical hernias for more than 10 years  Patient is here for assessment and for possible surgery  Patient cardiologist as said the no surgery as patient is very high risks from cardiac point   Patient does not want any surgical intervention watch and wait   Patient is morbidly morbid obese he has lost 55 lb since the April and he is doing significant effort to lose weight  Patient is ambulating with walker as extensive arthritis involving multiple joints    SUBJECTIVE:   My hernia is not a bothering  OBJECTIVE:  No complaints related to hernia  No fever no chills no rigors  Tolerating p o  Diet well  Normal bowel movement no constipation or diarrhea  Ambulating well with the walker  Vitals:   /86 (BP Location: Right arm, Patient Position: Sitting, Cuff Size: Standard)   Pulse 69   Temp 97 9 °F (36 6 °C) (Tympanic)   Ht 6' 1" (1 854 m)   Wt (!) 152 kg (334 lb)   BMI 44 07 kg/m²     Active medications:    Current Outpatient Medications:     albuterol (VENTOLIN HFA) 90 mcg/act inhaler, Inhale 2 puffs every 6 (six) hours as needed for wheezing, Disp: 1 Inhaler, Rfl: 6    allopurinol (ZYLOPRIM) 100 mg tablet, Take 2 tablets (200 mg total) by mouth daily, Disp: 60 tablet, Rfl: 5    ascorbic acid (VITAMIN C) 500 mg tablet, Take 500 mg by mouth daily  , Disp: , Rfl:     B Complex Vitamins (B COMPLEX 1 PO), Take 1 tablet by mouth daily  , Disp: , Rfl:     Biotin (BIOTIN 5000) 5 MG CAPS, Take 1,000 mcg by mouth daily  , Disp: , Rfl:     calcium carbonate-vitamin D (OSCAL-D) 500 mg-200 units per tablet, Take 2 tablets by mouth daily at bedtime  , Disp: , Rfl:     clopidogrel (PLAVIX) 75 mg tablet, Take 1 tablet (75 mg total) by mouth daily, Disp: 90 tablet, Rfl: 3    colchicine (COLCRYS) 0 6 mg tablet, Take 1 tablet (0 6 mg total) by mouth 2 (two) times a day Take twice daily x 7 days as needed for gout, Disp: 30 tablet, Rfl: 5    Cranberry 1000 MG CAPS, Take 1 tablet by mouth 2 (two) times a day , Disp: , Rfl:     cyanocobalamin 1000 MCG tablet, Take 100 mcg by mouth daily, Disp: , Rfl:     dextromethorphan-guaiFENesin (ROBITUSSIN DM)  mg/5 mL syrup, Take 10 mL by mouth every 4 (four) hours as needed for cough, Disp: 118 mL, Rfl: 0    felodipine (PLENDIL) 5 mg 24 hr tablet, Take 1 tablet (5 mg total) by mouth daily, Disp: 90 tablet, Rfl: 3    finasteride (PROSCAR) 5 mg tablet, Take 1 tablet (5 mg total) by mouth daily, Disp: 90 tablet, Rfl: 3    Flaxseed, Linseed, (EQL FLAX SEED OIL) 1000 MG CAPS, Take by mouth daily  , Disp: , Rfl:     fluticasone-salmeterol (ADVAIR) 250-50 mcg/dose inhaler, Inhale 1 puff 2 (two) times a day Rinse mouth after use , Disp: 1 Inhaler, Rfl: 6    fluticasone-vilanterol (BREO ELLIPTA) 100-25 mcg/inh inhaler, Inhale 1 puff daily Rinse mouth after use , Disp: 3 Inhaler, Rfl: 3    furosemide (LASIX) 40 mg tablet, Take 1 tablet (40 mg total) by mouth 2 (two) times a day, Disp: 180 tablet, Rfl: 3    gabapentin (NEURONTIN) 800 mg tablet, Take 800 mg by mouth daily, Disp: , Rfl:     glucosamine-chondroitin 500-400 MG tablet, Take 1 tablet by mouth 2 (two) times a day , Disp: , Rfl:     ipratropium-albuterol (DUO-NEB) 0 5-2 5 mg/3 mL nebulizer solution, Take 1 vial (3 mL total) by nebulization 3 (three) times a day, Disp: 270 mL, Rfl: 9    lactase (LACTAID) 3,000 units tablet, Take 3,000 Units by mouth as needed  , Disp: , Rfl:     Omega-3 Fatty Acids (FISH OIL) 1,000 mg, Take 1,000 mg by mouth 2 (two) times a day , Disp: , Rfl:     PARoxetine (PAXIL) 20 mg tablet, TAKE 1/2 TABLET BY MOUTH DAILY, Disp: 30 tablet, Rfl: 0    psyllium (METAMUCIL) 58 6 % powder, Take 1 packet by mouth daily as needed , Disp: , Rfl:     rivaroxaban (XARELTO) 15 mg tablet, Take 1 tablet (15 mg total) by mouth daily with breakfast (Patient taking differently: Take 20 mg by mouth daily with breakfast ), Disp: , Rfl:     simvastatin (ZOCOR) 20 mg tablet, Take 1 tablet (20 mg total) by mouth daily at bedtime, Disp: 90 tablet, Rfl: 3    traMADol (ULTRAM) 50 mg tablet, Take 50 mg by mouth every 6 (six) hours as needed for moderate pain, Disp: , Rfl:     fluocinonide (LIDEX) 0 05 % cream, Apply topically 2 (two) times a day for 90 days, Disp: 120 g, Rfl: 2    Physical Exam:   General Alert awake morbidly morbid obese  Normocephalic atraumatic PERRLA   Lungs clear bilaterally  Cardiac normal S1 normal S2  Abdomen difficult exam secondary to patient's body habitus soft,non tender Bowel sounds present  Small reducible hernia no peritoneal signs no signs of obstruction or inflammation around the hernia site  Ext: No clubbing, cyanosis, edema    Visit Diagnosis:   Diagnoses and all orders for this visit:    Encounter for surgical follow-up care    Bilateral leg edema    Low back pain    Morbid obesity (HCC)    Pacemaker    Urinary incontinence    BMI 45 0-49 9, adult (HCC)    Difficulty in walking    Pain in joint involving ankle and foot    Chronic gout without tophus    Status post primary angioplasty with coronary stent    CKD (chronic kidney disease) stage 3, GFR 30-59 ml/min ([facility])    Essential hypertension    Peripheral arteriosclerosis (HCC)    Aneurysm of abdominal aorta (HCC)    CAD (coronary artery disease)    Atherosclerosis of arteries of extremities (HCC)    Benign hypertension with chronic kidney disease, stage III (HCC)    Chronic atrial fibrillation    Diastolic congestive heart failure (HCC)    Arteriosclerosis of coronary artery    COPD (chronic obstructive pulmonary disease) ([facility] [address] )    JOVI (obstructive sleep apnea)    Nephrolithiasis    Colon, diverticulosis       Plan of care was discussed with family wife at bedside And patient in detail  CT scan films were reviewed with patient 2 1 cm defect with fat containing hernia    Pertinent labs reviewed  Most Recent Labs:   No visits with results within 2 Week(s) from this visit  Latest known visit with results is:   No results displayed because visit has over 200 results  Pertinent images and available reads personally reviewed  Procedure: Ct Abdomen Pelvis Wo Contrast    Result Date: 10/30/2019  Narrative: CT ABDOMEN AND PELVIS WITHOUT IV CONTRAST INDICATION:   K43 9: Ventral hernia without obstruction or gangrene K57 30: Diverticulosis of large intestine without perforation or abscess without bleeding J44 9: Chronic obstructive pulmonary disease, unspecified Z68 42: Body mass index (BMI) 45 0-49 9, adult R60 0: Localized edema  COMPARISON:  CT abdomen pelvis 12/8/2012 TECHNIQUE:  CT examination of the abdomen and pelvis was performed without intravenous contrast   Axial, sagittal, and coronal 2D reformatted images were created from the source data and submitted for interpretation  Radiation dose length product (DLP) for this visit:  2300 27 mGy-cm   This examination, like all CT scans performed in the [facility], was performed utilizing techniques to minimize radiation dose exposure, including the use of iterative reconstruction and automated exposure control  Enteric contrast was administered  FINDINGS: ABDOMEN LOWER CHEST:  Atelectatic changes are noted at the lung bases  LIVER/BILIARY TREE:  Unremarkable  GALLBLADDER:  Gallbladder is surgically absent  SPLEEN:  Unremarkable  PANCREAS:  Unremarkable  ADRENAL GLANDS:  Unremarkable  KIDNEYS/URETERS:  Unremarkable  No hydronephrosis  Tiny nonobstructing nephrolithiasis noted   STOMACH AND BOWEL:  There is colonic diverticulosis without evidence of acute diverticulitis  APPENDIX:  A normal appendix was visualized  ABDOMINOPELVIC CAVITY:  No ascites or free intraperitoneal air  No lymphadenopathy  VESSELS:  Ectasia of the infrarenal abdominal aorta is a stable finding  PELVIS REPRODUCTIVE ORGANS:  Unremarkable for patient's age  URINARY BLADDER:  Few small thickening of the underdistended bladder noted with no perivesical inflammation  ABDOMINAL WALL/INGUINAL REGIONS:  A fat-containing supraumbilical ventral wall hernia is a stable finding with the abdominal wall defect measuring 2 1 cm  OSSEOUS STRUCTURES:  During the interim, there has been near complete fusion of the L1-L2 disc space with progression of endplate sclerosis  No destruction of the endplates to suggest an infectious process nor is there significant impingement upon the thecal sac  Impression: Stable fat-containing supraumbilical hernia  Diverticulosis  Bilateral subcentimeter nonobstructing nephrolithiasis  Workstation performed: [id number]       Pertinent notes reviewed    Counseling / Coordination of Care  Total Office time /unit time spent today 15minutes  Greater than 50% of total time was spent with the patient and / or family counseling and / or coordination of care  A description of the counseling / coordination of care:  I performed an interim history, pertinent images and labs, performed a physical examination to arrive at the plan delineated above with associated thought processes  Family member/primary contact updated -wife at the bedside    Vick Shelton MD MS [address]  10/30/19 9:49 AM        Portions of the record may have been created with voice recognition software  Occasional wrong word or "sound a like" substitutions may have occurred due to the inherent limitations of voice recognition software  Read the chart carefully and recognize, using context, where substitutions have occurred

## 2019-11-01 ENCOUNTER — PATIENT OUTREACH (OUTPATIENT)
Dept: FAMILY MEDICINE CLINIC | Facility: CLINIC | Age: 79
End: 2019-11-01

## 2019-11-05 ENCOUNTER — OFFICE VISIT (OUTPATIENT)
Dept: PULMONOLOGY | Facility: MEDICAL CENTER | Age: 79
End: 2019-11-05
Payer: MEDICARE

## 2019-11-05 VITALS
HEART RATE: 78 BPM | RESPIRATION RATE: 12 BRPM | OXYGEN SATURATION: 97 % | WEIGHT: 315 LBS | TEMPERATURE: 97.5 F | SYSTOLIC BLOOD PRESSURE: 122 MMHG | HEIGHT: 73 IN | BODY MASS INDEX: 41.75 KG/M2 | DIASTOLIC BLOOD PRESSURE: 62 MMHG

## 2019-11-05 DIAGNOSIS — J41.8 MIXED SIMPLE AND MUCOPURULENT CHRONIC BRONCHITIS (HCC): ICD-10-CM

## 2019-11-05 DIAGNOSIS — G47.33 OSA (OBSTRUCTIVE SLEEP APNEA): Primary | ICD-10-CM

## 2019-11-05 PROBLEM — J18.9 PNEUMONIA DUE TO INFECTIOUS ORGANISM: Status: RESOLVED | Noted: 2019-06-23 | Resolved: 2019-11-05

## 2019-11-05 PROCEDURE — 99214 OFFICE O/P EST MOD 30 MIN: CPT | Performed by: NURSE PRACTITIONER

## 2019-11-05 NOTE — PATIENT INSTRUCTIONS
Sleep Apnea   AMBULATORY CARE:   Sleep apnea  is also called obstructive sleep apnea  It is a condition that causes you to stop breathing for 10 seconds or more while you are sleeping  During normal sleep, your throat is kept open by muscles that let air pass through easily  Sleep apnea causes the muscles and tissues around your throat to relax and block air from passing through  Sleep apnea may happen many times while you are asleep  Common symptoms include the following:   · No signs of breathing for 10 seconds or more while you sleep    · Snoring loudly, snorting, gasping or choking while you sleep, and waking up suddenly because of these    · A hard time thinking, remembering things, or focusing on your tasks the following day    · Headache or nausea    · Bedwetting or waking up often during the night to urinate    · Feeling sleepy, slow, and tired during the day  Seek care immediately if:   · You have chest pain or trouble breathing  Contact your healthcare provider if:   · You feel tired or depressed  · You have trouble staying awake during the day  · You have trouble thinking clearly  · You have questions or concerns about your condition or care  Treatment for sleep apnea  includes using a continuous positive airway pressure (CPAP) machine to keep your airway open during sleep  A mask is placed over your nose and mouth, or just your nose  The mask is hooked to the CPAP machine that blows a gentle stream of air into the mask when you breathe  This helps keep your airway open so you can breathe more regularly  Extra oxygen may be given to you through the machine  You may be given a mouth device  It looks like a mouth guard or dental retainer and stops your tongue and mouth tissues from blocking your throat while you sleep  Surgery may be needed to remove extra tissues that block your mouth, throat, or nose  Manage sleep apnea:   · Do not smoke    Nicotine and other chemicals in cigarettes and cigars can cause lung damage  Ask your healthcare provider for information if you currently smoke and need help to quit  E-cigarettes or smokeless tobacco still contain nicotine  Talk to your healthcare provider before you use these products  · Do not drink alcohol or take sedative medicine before you go to sleep  Alcohol and sedatives can relax the muscles and tissues around your throat  This can block the airflow to your lungs  · Maintain a healthy weight  Excess tissue around your throat may restrict your breathing  Ask your healthcare provider for information if you need to lose weight  · Sleep on your side or use pillows designed to prevent sleep apnea  This prevents your tongue or other tissues from blocking your throat  You can also raise the head of your bed  Follow up with your healthcare provider as directed:  Write down your questions so you remember to ask them during your visits  © 2017 [employer] Information is for End User's use only and may not be sold, redistributed or otherwise used for commercial purposes  All illustrations and images included in CareNotes® are the copyrighted property of [employer]  or Ose[employer]  The above information is an  only  It is not intended as medical advice for individual conditions or treatments  Talk to your doctor, nurse or pharmacist before following any medical regimen to see if it is safe and effective for you

## 2019-11-05 NOTE — ASSESSMENT & PLAN NOTE
Patient has a history of severe obstructive sleep apnea  He is here today for compliance  Since his last visit he underwent unsuccessful treatment study to CPAP  He was still having respiratory events at CPAP pressure of 19  He required auto BiPAP and is here today for compliance  Auto titrate BiPAP with minimum EPAP of 15 pressure support of for maximum IPAP of 25 with heated humidification was order with a medium ResMed med facial 20 interface mask  Patient is 100% compliant  His usage is reviewed for the last 30 days  Maximum usage is 8 hours and 39 minutes in usage over 4 hours is 100% his AHI is reduced to 1 7 but but his large air leak is  3 hours and 17 minutes  Patient finds the mask and the machine on comfortable  He is compliant and is usage  Plan includes mask refit    He will come in on Wednesday for mask refit and will me with Respiratory therapist   Perhaps he needs a different mask and adjustment of auto BiPAP

## 2019-11-05 NOTE — PROGRESS NOTES
Assessment/Plan:     Problem List Items Addressed This Visit        Respiratory    JOVI (obstructive sleep apnea) - Primary     Patient has a history of severe obstructive sleep apnea  He is here today for compliance  Since his last visit he underwent unsuccessful treatment study to CPAP  He was still having respiratory events at CPAP pressure of 19  He required auto BiPAP and is here today for compliance  Auto titrate BiPAP with minimum EPAP of 15 pressure support of for maximum IPAP of 25 with heated humidification was order with a medium ResMed med facial 20 interface mask  Patient is 100% compliant  His usage is reviewed for the last 30 days  Maximum usage is 8 hours and 39 minutes in usage over 4 hours is 100% his AHI is reduced to 1 7 but but his large air leak is  3 hours and 17 minutes  Patient finds the mask and the machine on comfortable  He is compliant and is usage  Plan includes mask refit  He will come in on Wednesday for mask refit and will me with Respiratory therapist   Perhaps he needs a different mask and adjustment of auto BiPAP           Mixed simple and mucopurulent chronic bronchitis ([address] )     Patient is a former smoker  He smoked for approximately 50+ years  He smoked 1 pack a day  He quit in March or April of 2019  He had CT of his chest done in August 2019  No active cardiopulmonary disease was noted  Pulmonary function test were done in May 2019 forced vital capacity was 2 71 L or 58% of predicted, FEV1 was 2 03 L or 61% of predicted and obstruction ratio was 75  Flow volume loop showed moderately severe restrictive pattern  Continues to use Breo 100 b i d  He does have a nebulizer home with duo nebs and  twice daily  He is stable at this time                   Return in about 1 year (around 11/5/2020)  All questions are answered to the patient's satisfaction and understanding  He verbalizes understanding    He is encouraged to call with any further questions or concerns  Portions of the record may have been created with voice recognition software  Occasional wrong word or "sound a like" substitutions may have occurred due to the inherent limitations of voice recognition software  Read the chart carefully and recognize, using context, where substitutions have occurred  HPI:  Alida Mcdowell is a 25-year-old male with history of obstructive sleep apnea  Patient was last in the office July 17, 2019  He had a previous history of obstructive sleep apnea and previous apneic hypopnea index was 42 4 events per hour  This was done via sleep study in 2011  He also had hypoxemia with oxygen below 90% for 111 minutes  He had titration in 2002 and was titrated to CPAP of 18 cm of water pressure  Patient went on to have a CPAP study on July 19 2019  He had unsuccessful titration is CPAP  He was still having respiratory events at CPAP pressure of 19  He was then determined to be needing BiPAP due to failure CPAP  It was recommended that he have auto titration BiPAP with EPAP of 15 pressure support of 4 max IPAP of 25 acute heated humidification and compliance visit  Answers for HPI/ROS submitted by the patient on 11/3/2019   Primary symptoms  Do you have ear congestion?: No  Do you have heartburn?: No  Do you have fatigue?: No  Do you have nasal congestion?: No  Do you have shortness of breath when lying flat?: No  Do you have shortness of breath when you wake up?: No  Do you have sweats?: No  Have you experienced weight loss?: No  Which of the following makes your symptoms worse?: change in weather  Which of the following makes your symptoms better?: diuretics, OTC cough suppressant, steroid inhaler  Risk factors for lung disease: smoking/tobacco exposure    Electronically Signed by NAVA Stauffer    ______________________________________________________________________    Chief Complaint:   Chief Complaint   Patient presents with    Shortness of Breath     pt states okay    no cough  or wheeze   Sleep Apnea     pt states that  he is having issues with mask          Patient ID: Nilay Culver is a 78 y o  y o  male has a past medical history of Arthritis, Atrial flutter ([facility] [address] ), Chronic kidney disease, Coronary artery disease, Fluid retention, Gout, Heart failure ([facility] [address] ), Hypertension, Pacemaker, Pulmonary emphysema ([facility] [address] ), Radiculopathy, Shortness of breath, and Sleep apnea  11/5/2019  Patient presents today for follow-up visit  Shortness of Breath   Pertinent negatives include no chest pain, ear pain, fever, headaches, rhinorrhea or sore throat  primary symptoms   Pertinent negatives include no chest pain, fever, headaches, myalgias or sore throat  Review of Systems   Constitutional: Negative  Negative for appetite change and fever  HENT: Negative  Negative for ear pain, postnasal drip, rhinorrhea, sneezing, sore throat and trouble swallowing  Eyes: Negative  Respiratory: Positive for shortness of breath  Cardiovascular: Negative  Negative for chest pain  Musculoskeletal: Negative  Negative for myalgias  Allergic/Immunologic: Negative  Neurological: Negative  Negative for headaches  Hematological: Negative  Psychiatric/Behavioral: Negative  Smoking history: He reports that he quit smoking about 7 months ago  His smoking use included cigarettes  He has a 50 00 pack-year smoking history   He has never used smokeless tobacco     The following portions of the patient's history were reviewed and updated as appropriate: current medications, past family history, past medical history, past social history, past surgical history and problem list     Immunization History   Administered Date(s) Administered    Influenza Split High Dose Preservative Free IM 12/03/2013, 10/23/2015    Influenza TIV (IM) 11/05/2001, 12/15/2009, 01/20/2011, 10/19/2011, 10/01/2012    Influenza, high dose seasonal 0 5 mL 10/01/2018, 10/10/2019    Pneumococcal Conjugate 13-Valent 07/09/2019    Pneumococcal Polysaccharide PPV23 03/26/2013    Tdap 04/01/2016, 04/03/2016    Zoster 10/01/2012     Current Outpatient Medications   Medication Sig Dispense Refill    allopurinol (ZYLOPRIM) 100 mg tablet Take 2 tablets (200 mg total) by mouth daily 60 tablet 5    ascorbic acid (VITAMIN C) 500 mg tablet Take 500 mg by mouth daily   B Complex Vitamins (B COMPLEX 1 PO) Take 1 tablet by mouth daily   Biotin (BIOTIN 5000) 5 MG CAPS Take 1,000 mcg by mouth daily   calcium carbonate-vitamin D (OSCAL-D) 500 mg-200 units per tablet Take 2 tablets by mouth daily at bedtime   clopidogrel (PLAVIX) 75 mg tablet Take 1 tablet (75 mg total) by mouth daily 90 tablet 3    colchicine (COLCRYS) 0 6 mg tablet Take 1 tablet (0 6 mg total) by mouth 2 (two) times a day Take twice daily x 7 days as needed for gout 30 tablet 5    Cranberry 1000 MG CAPS Take 1 tablet by mouth 2 (two) times a day   cyanocobalamin 1000 MCG tablet Take 100 mcg by mouth daily      dextromethorphan-guaiFENesin (ROBITUSSIN DM)  mg/5 mL syrup Take 10 mL by mouth every 4 (four) hours as needed for cough 118 mL 0    felodipine (PLENDIL) 5 mg 24 hr tablet Take 1 tablet (5 mg total) by mouth daily 90 tablet 3    finasteride (PROSCAR) 5 mg tablet Take 1 tablet (5 mg total) by mouth daily 90 tablet 3    Flaxseed, Linseed, (EQL FLAX SEED OIL) 1000 MG CAPS Take by mouth daily   fluticasone-vilanterol (BREO ELLIPTA) 100-25 mcg/inh inhaler Inhale 1 puff daily Rinse mouth after use  3 Inhaler 3    glucosamine-chondroitin 500-400 MG tablet Take 1 tablet by mouth 2 (two) times a day   ipratropium-albuterol (DUO-NEB) 0 5-2 5 mg/3 mL nebulizer solution Take 1 vial (3 mL total) by nebulization 3 (three) times a day 270 mL 9    lactase (LACTAID) 3,000 units tablet Take 3,000 Units by mouth as needed   Omega-3 Fatty Acids (FISH OIL) 1,000 mg Take 1,000 mg by mouth 2 (two) times a day        PARoxetine (PAXIL) 20 mg tablet TAKE 1/2 TABLET BY MOUTH DAILY 30 tablet 0    psyllium (METAMUCIL) 58 6 % powder Take 1 packet by mouth daily as needed       rivaroxaban (XARELTO) 15 mg tablet Take 1 tablet (15 mg total) by mouth daily with breakfast (Patient taking differently: Take 20 mg by mouth daily with breakfast )      simvastatin (ZOCOR) 20 mg tablet Take 1 tablet (20 mg total) by mouth daily at bedtime 90 tablet 3    traMADol (ULTRAM) 50 mg tablet Take 50 mg by mouth every 6 (six) hours as needed for moderate pain      albuterol (VENTOLIN HFA) 90 mcg/act inhaler Inhale 2 puffs every 6 (six) hours as needed for wheezing 1 Inhaler 6    fluocinonide (LIDEX) 0 05 % cream Apply topically 2 (two) times a day for 90 days 120 g 2    fluticasone-salmeterol (ADVAIR) 250-50 mcg/dose inhaler Inhale 1 puff 2 (two) times a day Rinse mouth after use  (Patient not taking: Reported on 11/5/2019) 1 Inhaler 6    furosemide (LASIX) 40 mg tablet Take 1 tablet (40 mg total) by mouth 2 (two) times a day 180 tablet 3    gabapentin (NEURONTIN) 800 mg tablet Take 800 mg by mouth daily       No current facility-administered medications for this visit  Allergies: Patient has no known allergies  Objective:  Vitals:    11/05/19 1028   BP: 122/62   BP Location: Left arm   Patient Position: Sitting   Cuff Size: Extra-Large   Pulse: 78   Resp: 12   Temp: 97 5 °F (36 4 °C)   TempSrc: Tympanic   SpO2: 97%   Weight: (!) 151 kg (332 lb)   Height: 6' 1" (1 854 m)   Oxygen Therapy  SpO2: 97 %    Wt Readings from Last 3 Encounters:   11/05/19 (!) 151 kg (332 lb)   10/30/19 (!) 152 kg (334 lb)   10/23/19 (!) 156 kg (344 lb)     Body mass index is 43 8 kg/m²  Physical Exam   Constitutional: He appears well-developed and well-nourished  BMI 43   HENT:   Head: Normocephalic and atraumatic  Mouth/Throat: Oropharynx is clear and moist    Mallampati 4   Eyes: Pupils are equal, round, and reactive to light   EOM are normal    Neck: Normal range of motion  Neck supple  Cardiovascular: Normal rate  Murmur heard  Pulmonary/Chest: Effort normal and breath sounds normal    Abdominal: Soft  Musculoskeletal: Normal range of motion  Right lower leg: Normal         Left lower leg: Normal    Neurological: He is alert  Skin: Skin is warm and dry  Capillary refill takes 2 to 3 seconds  Psychiatric: He has a normal mood and affect  His behavior is normal        Lab Review:   No results displayed because visit has over 200 results        Orders Only on 05/22/2019   Component Date Value    Specific Gravity 05/22/2019 1 016     Ph 05/22/2019 6 0     Color UA 05/22/2019 Yellow     Urine Appearance 05/22/2019 Clear     Leukocyte Esterase 05/22/2019 1+*    Protein 05/22/2019 Trace     Glucose, 24 HR Urine 05/22/2019 Negative     Ketone, Urine 05/22/2019 Negative     Blood, Urine 05/22/2019 Negative     Bilirubin, Urine 05/22/2019 Negative     Urobilinogen Urine 05/22/2019 0 2     SL AMB NITRITES URINE, Q* 05/22/2019 Negative     Microscopic Examination 05/22/2019 See below:     SL AMB WBC, URINE 05/22/2019 6-10*    RBC, Urine 05/22/2019 0-2     Epithelial Cells (non re* 05/22/2019 0-10     Casts 05/22/2019 Present*    Cast Type 05/22/2019 Hyaline casts     Crystals,ua 05/22/2019 Present*    Crystal Type 05/22/2019 Calcium Oxalate     MUCUS THREADS 05/22/2019 Present     Bacteria, Urine 05/22/2019 None seen     White Blood Cell Count 05/22/2019 7 2     Red Blood Cell Count 05/22/2019 4 07*    Hemoglobin 05/22/2019 12 7*    HCT 05/22/2019 38 3     MCV 05/22/2019 94     MCH 05/22/2019 31 2     MCHC 05/22/2019 33 2     RDW 05/22/2019 15 7*    Platelet Count 60/92/6944 191     Glucose, Random 05/22/2019 147*    BUN 05/22/2019 17     Creatinine 05/22/2019 1 58*    eGFR Non  05/22/2019 41*    eGFR  05/22/2019 48*    SL AMB BUN/CREATININE RA* 05/22/2019 11     Sodium 05/22/2019 143     Potassium 05/22/2019 4 1     Chloride 05/22/2019 99     CO2 05/22/2019 26     CALCIUM 05/22/2019 9 5     Creatinine, Urine 05/22/2019 108 7     Total Protein, Urine 05/22/2019 29 8     Prot/Creat Ratio, Ur 05/22/2019 274*    Uric Acid 05/22/2019 8 1     Phosphorus, Serum 05/22/2019 3 6     Magnesium, Serum 05/22/2019 1 9     PTH, Intact 05/22/2019 86*   Office Visit on 05/17/2019   Component Date Value    Glucose, Random 09/05/2019 182*    BUN 09/05/2019 27     Creatinine 09/05/2019 1 47*    eGFR Non African American 09/05/2019 45*    eGFR  09/05/2019 52*    SL AMB BUN/CREATININE RA* 09/05/2019 18     Sodium 09/05/2019 144     Potassium 09/05/2019 4 6     Chloride 09/05/2019 99     CO2 09/05/2019 22     CALCIUM 09/05/2019 9 6     White Blood Cell Count 09/05/2019 8 2     Red Blood Cell Count 09/05/2019 4 41     Hemoglobin 09/05/2019 13 3     HCT 09/05/2019 38 5     MCV 09/05/2019 87     MCH 09/05/2019 30 2     MCHC 09/05/2019 34 5     RDW 09/05/2019 15 7*    Platelet Count 25/41/4897 175     PTH, Intact 09/05/2019 42     Phosphorus, Serum 09/05/2019 4 0     Specific Gravity 09/05/2019 1 016     Ph 09/05/2019 6 0     Color UA 09/05/2019 Yellow     Urine Appearance 09/05/2019 Clear     Leukocyte Esterase 09/05/2019 2+*    Protein 09/05/2019 Negative     Glucose, 24 HR Urine 09/05/2019 Negative     Ketone, Urine 09/05/2019 Negative     Blood, Urine 09/05/2019 Negative     Bilirubin, Urine 09/05/2019 Negative     Urobilinogen Urine 09/05/2019 0 2     SL AMB NITRITES URINE, Q* 09/05/2019 Negative     Microscopic Examination 09/05/2019 See below:     Uric Acid 09/05/2019 8 0     Creatinine, Urine 09/05/2019 126 0     Total Protein, Urine 09/05/2019 14 4     Prot/Creat Ratio, Ur 09/05/2019 114     Magnesium, Serum 09/05/2019 2 4*    SL AMB WBC, URINE 09/05/2019 11-30*    RBC, Urine 09/05/2019 0-2     Epithelial Cells (non re* 09/05/2019 0-10     Casts 09/05/2019 Present*    Cast Type 09/05/2019 Hyaline casts     Crystals,ua 09/05/2019 Present*    Crystal Type 09/05/2019 Calcium Oxalate     MUCUS THREADS 09/05/2019 Present     Bacteria, Urine 09/05/2019 Few    Admission on 05/13/2019, Discharged on 05/14/2019   Component Date Value    Sodium 05/13/2019 138     Potassium 05/13/2019 3 7     Chloride 05/13/2019 103     CO2 05/13/2019 28     ANION GAP 05/13/2019 7     BUN 05/13/2019 20     Creatinine 05/13/2019 1 52*    Glucose 05/13/2019 153*    Glucose, Fasting 05/13/2019 153*    Calcium 05/13/2019 9 7     eGFR 05/13/2019 43     WBC 05/13/2019 4 59     RBC 05/13/2019 3 92     Hemoglobin 05/13/2019 12 3     Hematocrit 05/13/2019 36 6     MCV 05/13/2019 93     MCH 05/13/2019 31 4     MCHC 05/13/2019 33 6     RDW 05/13/2019 14 2     Platelets 87/36/5858 151     MPV 05/13/2019 9 0     Cholesterol 05/13/2019 123     Triglycerides 05/13/2019 117     HDL, Direct 05/13/2019 38*    LDL Calculated 05/13/2019 62     Non-HDL-Chol (CHOL-HDL) 05/13/2019 85     Magnesium 05/13/2019 2 1     Protime 05/13/2019 12 5     INR 05/13/2019 0 96     Ventricular Rate 05/13/2019 70     Atrial Rate 05/13/2019 72     QRSD Interval 05/13/2019 168     QT Interval 05/13/2019 450     QTC Interval 05/13/2019 486     QRS Axis 05/13/2019 -69     T Wave Axis 05/13/2019 102     Sodium 05/14/2019 140     Potassium 05/14/2019 3 7     Chloride 05/14/2019 103     CO2 05/14/2019 27     ANION GAP 05/14/2019 10     BUN 05/14/2019 22     Creatinine 05/14/2019 1 70*    Glucose 05/14/2019 129     Glucose, Fasting 05/14/2019 129*    Calcium 05/14/2019 9 3     eGFR 05/14/2019 38     WBC 05/14/2019 4 55     RBC 05/14/2019 3 94     Hemoglobin 05/14/2019 12 2     Hematocrit 05/14/2019 37 7     MCV 05/14/2019 96     MCH 05/14/2019 31 0     MCHC 05/14/2019 32 4     RDW 05/14/2019 14 2     Platelets 60/52/0198 159     MPV 05/14/2019 9 6    Orders Only on 05/07/2019 Component Date Value    White Blood Cell Count 05/07/2019 5 7     Red Blood Cell Count 05/07/2019 4 16     Hemoglobin 05/07/2019 12 9*    HCT 05/07/2019 37 8     MCV 05/07/2019 91     MCH 05/07/2019 31 0     MCHC 05/07/2019 34 1     RDW 05/07/2019 14 9     Platelet Count 89/96/5015 148*    Neutrophils 05/07/2019 64     Lymphocytes 05/07/2019 20     Monocytes 05/07/2019 11     Eosinophils 05/07/2019 3     Basophils PCT 05/07/2019 1     Neutrophils (Absolute) 05/07/2019 3 7     Lymphocytes (Absolute) 05/07/2019 1 1     Monocytes (Absolute) 05/07/2019 0 6     Eosinophils (Absolute) 05/07/2019 0 1     Basophils ABS 05/07/2019 0 0     Immature Granulocytes 05/07/2019 1     Immature Granulocytes (A* 05/07/2019 0 1     Glucose, Random 05/07/2019 141*    BUN 05/07/2019 31*    Creatinine 05/07/2019 1 69*    eGFR Non  05/07/2019 38*    eGFR  05/07/2019 44*    SL AMB BUN/CREATININE RA* 05/07/2019 18     Sodium 05/07/2019 140     Potassium 05/07/2019 4 1     Chloride 05/07/2019 99     CO2 05/07/2019 24     CALCIUM 05/07/2019 9 4     INR 05/07/2019 1 1     Prothrombin Time 05/07/2019 11 7     Comment 05/07/2019 Comment        Diagnostics:  I have personally reviewed pertinent films in PACS    Office Spirometry Results:     ESS:    Ct Abdomen Pelvis Wo Contrast    Result Date: 10/30/2019  Narrative: CT ABDOMEN AND PELVIS WITHOUT IV CONTRAST INDICATION:   K43 9: Ventral hernia without obstruction or gangrene K57 30: Diverticulosis of large intestine without perforation or abscess without bleeding J44 9: Chronic obstructive pulmonary disease, unspecified Z68 42: Body mass index (BMI) 45 0-49 9, adult R60 0: Localized edema   COMPARISON:  CT abdomen pelvis 12/8/2012 TECHNIQUE:  CT examination of the abdomen and pelvis was performed without intravenous contrast   Axial, sagittal, and coronal 2D reformatted images were created from the source data and submitted for interpretation  Radiation dose length product (DLP) for this visit:  2300 27 mGy-cm   This examination, like all CT scans performed in the [facility], was performed utilizing techniques to minimize radiation dose exposure, including the use of iterative reconstruction and automated exposure control  Enteric contrast was administered  FINDINGS: ABDOMEN LOWER CHEST:  Atelectatic changes are noted at the lung bases  LIVER/BILIARY TREE:  Unremarkable  GALLBLADDER:  Gallbladder is surgically absent  SPLEEN:  Unremarkable  PANCREAS:  Unremarkable  ADRENAL GLANDS:  Unremarkable  KIDNEYS/URETERS:  Unremarkable  No hydronephrosis  Tiny nonobstructing nephrolithiasis noted  STOMACH AND BOWEL:  There is colonic diverticulosis without evidence of acute diverticulitis  APPENDIX:  A normal appendix was visualized  ABDOMINOPELVIC CAVITY:  No ascites or free intraperitoneal air  No lymphadenopathy  VESSELS:  Ectasia of the infrarenal abdominal aorta is a stable finding  PELVIS REPRODUCTIVE ORGANS:  Unremarkable for patient's age  URINARY BLADDER:  Few small thickening of the underdistended bladder noted with no perivesical inflammation  ABDOMINAL WALL/INGUINAL REGIONS:  A fat-containing supraumbilical ventral wall hernia is a stable finding with the abdominal wall defect measuring 2 1 cm  OSSEOUS STRUCTURES:  During the interim, there has been near complete fusion of the L1-L2 disc space with progression of endplate sclerosis  No destruction of the endplates to suggest an infectious process nor is there significant impingement upon the thecal sac  Impression: Stable fat-containing supraumbilical hernia  Diverticulosis  Bilateral subcentimeter nonobstructing nephrolithiasis  Workstation performed: [id number]     Xr Chest Pa & Lateral    Result Date: 10/18/2019  Narrative: CHEST INDICATION:   R06 02: Shortness of breath   COMPARISON:  7/24/2019 EXAM PERFORMED/VIEWS:  XR CHEST PA & LATERAL Images: 3 FINDINGS: Left-sided chest wall pacemaker is identified  Pacemaker leads are intact  The heart is top normal in size but stable  Central vascular engorgement again noted  Mild vascular congestion with no lobar consolidation  No large pleural effusions or pneumothorax  Osseous structures appear within normal limits for patient age  Impression: Chronic vascular congestion and mild cardiomegaly   Workstation performed: [id number]   Answers for HPI/ROS submitted by the patient on 11/3/2019   Primary symptoms  Do you have ear congestion?: No  Do you have heartburn?: No  Do you have fatigue?: No  Do you have nasal congestion?: No  Do you have shortness of breath when lying flat?: No  Do you have shortness of breath when you wake up?: No  Do you have sweats?: No  Have you experienced weight loss?: No  Which of the following makes your symptoms worse?: change in weather  Which of the following makes your symptoms better?: diuretics, OTC cough suppressant, steroid inhaler  Risk factors for lung disease: smoking/tobacco exposure    Answers for HPI/ROS submitted by the patient on 11/3/2019   Primary symptoms  Do you have ear congestion?: No  Do you have heartburn?: No  Do you have fatigue?: No  Do you have nasal congestion?: No  Do you have shortness of breath when lying flat?: No  Do you have shortness of breath when you wake up?: No  Do you have sweats?: No  Have you experienced weight loss?: No  Which of the following makes your symptoms worse?: change in weather  Which of the following makes your symptoms better?: diuretics, OTC cough suppressant, steroid inhaler  Risk factors for lung disease: smoking/tobacco exposure

## 2019-11-05 NOTE — ASSESSMENT & PLAN NOTE
Patient is a former smoker  He smoked for approximately 50+ years  He smoked 1 pack a day  He quit in March or April of 2019  He had CT of his chest done in August 2019  No active cardiopulmonary disease was noted  Pulmonary function test were done in May 2019 forced vital capacity was 2 71 L or 58% of predicted, FEV1 was 2 03 L or 61% of predicted and obstruction ratio was 75  Flow volume loop showed moderately severe restrictive pattern  Continues to use Breo 100 b i d  He does have a nebulizer home with duo nebs and  twice daily    He is stable at this time

## 2019-12-26 ENCOUNTER — OFFICE VISIT (OUTPATIENT)
Dept: PODIATRY | Facility: CLINIC | Age: 79
End: 2019-12-26
Payer: MEDICARE

## 2019-12-26 VITALS
HEIGHT: 73 IN | HEART RATE: 78 BPM | SYSTOLIC BLOOD PRESSURE: 122 MMHG | WEIGHT: 315 LBS | BODY MASS INDEX: 41.75 KG/M2 | DIASTOLIC BLOOD PRESSURE: 64 MMHG | RESPIRATION RATE: 17 BRPM

## 2019-12-26 DIAGNOSIS — M77.42 METATARSALGIA OF BOTH FEET: ICD-10-CM

## 2019-12-26 DIAGNOSIS — M54.16 RADICULOPATHY OF LUMBAR REGION: ICD-10-CM

## 2019-12-26 DIAGNOSIS — I70.209 PERIPHERAL ARTERIOSCLEROSIS (HCC): ICD-10-CM

## 2019-12-26 DIAGNOSIS — M77.41 METATARSALGIA OF BOTH FEET: ICD-10-CM

## 2019-12-26 DIAGNOSIS — M21.969 ACQUIRED DEFORMITY OF ANKLE AND FOOT, UNSPECIFIED LATERALITY: Primary | ICD-10-CM

## 2019-12-26 PROCEDURE — 99213 OFFICE O/P EST LOW 20 MIN: CPT | Performed by: PODIATRIST

## 2019-12-26 NOTE — PROGRESS NOTES
Assessment/Plan:  Pain upon ambulation   Limb pain   Rule out radiculopathy   Edema secondary to venous insufficiency   Rule out degenerative disc disease   Probable spinal stenosis      Plan   Attempt pain control   Patient is taking gabapentin 800 mg t i d   With some Jannifer People has breakthrough pain with Renetta Ruelas was unable to take Medrol Dosepak as directed   Follow up with pain management      No problem-specific Assessment & Plan notes found for this encounter          There are no diagnoses linked to this encounter        Subjective:   Patient has continued ongoing pain in his legs    however it is improved   He rates pain on a scale 3/10  Janna Snider is seeing pain management  Janna Snider is slated to have epidural spinal injection           Past Medical History:   Diagnosis Date    Arthritis      Atrial flutter ([facility] [address] [facility]      Chronic kidney disease       stage 3    Coronary artery disease       2 stents    Fluid retention      Gout      Heart failure ([facility] [address] [facility]       pacemaker    Hypertension      Pacemaker      Pulmonary emphysema ([facility] [address] [facility]      Radiculopathy       last assessed 1/28/16     Shortness of breath       exertion    Sleep apnea       c pap                   Past Surgical History:   Procedure Laterality Date    ANGIOPLASTY         x2 2 stents and then replaced    CARDIAC PACEMAKER PLACEMENT         pacemaker permanent placement dual chamber / last assessed 4/7/14 / implantation     CARDIAC SURGERY         pacemaker    CHOLECYSTECTOMY        CORONARY ANGIOPLASTY WITH STENT PLACEMENT        EPIDURAL BLOCK INJECTION N/A 5/26/2016     Procedure: BLOCK / INJECTION EPIDURAL STEROID LUMBAR  L4-5;  Surgeon: Kiki Hopkisn MD;  Location: [facility] MAIN OR;  Service:     EYE SURGERY         cataract left    KNEE ARTHROSCOPY W/ MENISCAL REPAIR Left      LUMBAR EPIDURAL INJECTION N/A 3/17/2016     Procedure: BLOCK / INJECTION LUMBAR  L4-5  (C-ARM);  Surgeon: Kiki Hopkins MD;  Location: [facility] MAIN OR;  Service:          No Known Allergies        Current Outpatient Prescriptions:     albuterol (VENTOLIN HFA) 90 mcg/act inhaler, Inhale 2 puffs every 6 (six) hours as needed for wheezing, Disp: 1 Inhaler, Rfl: 6    allopurinol (ZYLOPRIM) 100 mg tablet, Take 1 tablet (100 mg total) by mouth daily, Disp: 90 tablet, Rfl: 3    ascorbic acid (VITAMIN C) 500 mg tablet, Take 500 mg by mouth daily  , Disp: , Rfl:     B Complex Vitamins (B COMPLEX 1 PO), Take 1 tablet by mouth daily  , Disp: , Rfl:     Biotin (BIOTIN 5000) 5 MG CAPS, Take 1,000 mcg by mouth daily  , Disp: , Rfl:     calcium carbonate-vitamin D (OSCAL-D) 500 mg-200 units per tablet, Take 2 tablets by mouth daily at bedtime  , Disp: , Rfl:     ciclopirox (LOPROX) 0 77 % cream, Apply topically 2 (two) times a day for 20 days, Disp: 45 g, Rfl: 2    clopidogrel (PLAVIX) 75 mg tablet, TAKE 1 TABLET DAILY, Disp: 90 tablet, Rfl: 3    collagenase (SANTYL) ointment, Apply 1 application topically daily  , Disp: , Rfl:     Cranberry 1000 MG CAPS, Take 1 tablet by mouth 2 (two) times a day , Disp: , Rfl:     cyanocobalamin 1000 MCG tablet, Take 100 mcg by mouth daily, Disp: , Rfl:     felodipine (PLENDIL) 5 mg 24 hr tablet, TAKE 1 TABLET DAILY, Disp: 90 tablet, Rfl: 3    finasteride (PROSCAR) 5 mg tablet, Take 1 tablet (5 mg total) by mouth daily, Disp: 90 tablet, Rfl: 3    Flaxseed, Linseed, (EQL FLAX SEED OIL) 1000 MG CAPS, Take by mouth daily  , Disp: , Rfl:     fluocinonide (LIDEX) 0 05 % cream, Apply topically 2 (two) times a day for 30 days, Disp: 30 g, Rfl: 2    fluticasone-salmeterol (ADVAIR) 250-50 mcg/dose inhaler, Inhale 1 puff 2 (two) times a day Rinse mouth after use , Disp: 1 Inhaler, Rfl: 6    furosemide (LASIX) 40 mg tablet, TAKE 1 TABLET BY MOUTH DAILY AS NEEDED FOR LEG EDEMA OR DYSNEA, Disp: 30 tablet, Rfl: 0    gabapentin (NEURONTIN) 300 mg capsule, TAKE 1 CAPSULE (300 MG TOTAL) BY MOUTH 2 (TWO) TIMES A DAY FOR 30 DAYS, Disp: 60 capsule, Rfl: 2    gabapentin (NEURONTIN) 300 mg capsule, Take 1 capsule (300 mg total) by mouth 3 (three) times a day for 30 days, Disp: 90 capsule, Rfl: 0    gabapentin (NEURONTIN) 600 MG tablet, Take 1 tablet (600 mg total) by mouth 3 (three) times a day for 30 days, Disp: 60 tablet, Rfl: 0    glucosamine-chondroitin 500-400 MG tablet, Take 1 tablet by mouth 2 (two) times a day , Disp: , Rfl:     hydrOXYzine HCL (ATARAX) 25 mg tablet, Take 1 tablet (25 mg total) by mouth 3 (three) times a day for 3 days, Disp: 9 tablet, Rfl: 0    lactase (LACTAID) 3,000 units tablet, Take 3,000 Units by mouth as needed  , Disp: , Rfl:     methylPREDNISolone 4 MG tablet therapy pack, Use as directed on package, Disp: 21 tablet, Rfl: 0    Omega-3 Fatty Acids (FISH OIL) 1,000 mg, Take 1,000 mg by mouth 2 (two) times a day , Disp: , Rfl:     oxyCODONE-acetaminophen (PERCOCET) 5-325 mg per tablet, 1 tablet to be taken 3 times daily as needed for leg pain, Disp: 30 tablet, Rfl: 0    PARoxetine (PAXIL) 20 mg tablet, TAKE 1/2 TABLET BY MOUTH DAILY, Disp: 30 tablet, Rfl: 0    psyllium (METAMUCIL) 58 6 % powder, Take 1 packet by mouth daily Indications: 1 tsp , Disp: , Rfl:     rivaroxaban (XARELTO) tablet, Take 20 mg by mouth , Disp: , Rfl:     simvastatin (ZOCOR) 20 mg tablet, Take 1 tablet (20 mg total) by mouth daily at bedtime, Disp: 90 tablet, Rfl: 3    traMADol (ULTRAM) 50 mg tablet, 1 tablet to be taken twice daily as needed for foot and leg pain, Disp: 30 tablet, Rfl: 0           Patient Active Problem List   Diagnosis    Chronic pain disorder    Bilateral lumbar radiculopathy    Lumbar canal stenosis    Acquired ankle/foot deformity    Pain in joints of both feet    Dermatophytosis    Atherosclerosis of arteries of extremities (HCC)    Pain in both feet    Onychomycosis    Neck muscle spasm    Anxiety 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    Benign hypertension with CKD (chronic kidney disease) stage III ([facility] [address] [facility]    Bilateral leg edema    CKD (chronic kidney disease), stage III (HCC)    Microscopic hematuria    Proteinuria    Urinary frequency    Atrial fibrillation (HCC)    Benign prostatic hyperplasia    Congestive heart failure (HCC)    Arteriosclerosis of coronary artery    Allergic rhinitis    Aneurysm of abdominal aorta (HCC)    Angina pectoris (HCC)    Pain in joint involving ankle and foot    Atrial flutter (HCC)    Carpal tunnel syndrome    Chronic gout without tophus    Chronic low back pain    Chronic obstructive pulmonary disease (HCC)    Colon, diverticulosis    Deep venous insufficiency    Degenerative disc disease, lumbar    Difficulty in walking    Fecal soiling    Fibromyalgia    Fistula-in-ano    Hyperlipidemia    Insomnia, persistent    Memory loss    Moderate or severe vision impairment, one eye    Morbid obesity (HCC)    Nicotine dependence    Osteoarthritis of knee    Pacemaker    Sleep apnea    Urinary incontinence    Venous insufficiency (chronic) (peripheral)    BMI 45 0-49 9, adult (HCC)    Carbuncle of neck    Carbuncle of occipital region of scalp    Elevated glucose    Peripheral arteriosclerosis (HCC)    Radiculopathy of lumbar region    Metatarsalgia of both feet          Physical Exam  Left Foot: Appearance: Normal except as noted: excessive pronation-- and-- pes planus  Second toe deformities include hammer toe  Third toe deformities include hammer toe  Forth toe deformities include hammer toe  Fifth toe deformities include hammer toe  Tenderness: None except the great toe,-- distal first metatarsal,-- distal fifth metatarsal-- and-- insertion of the plantar fascia  Positive Brian sign third left intermetatarsal space  ROM: Full  Motor: Normal   Right Foot: Appearance: Normal except as noted: excessive pronation-- and-- pes planus  Second toe deformities include hammer toe  Third toe deformities include hammer toe   Forth toe deformities include hammer toe  Fifth toe deformities include hammer toe  Evaluation of the great toe nail demonstrates an ingrown nail  Tenderness: None except the insertion of the plantar fascia  Left Ankle: Appearance: Normal except erythema,-- swelling-- and-- swelling laterally  Tenderness: None except the tibiotalar joint  Right Ankle: ROM: Full  Neurological Exam: performed  Deep tendon reflexes: + tinel of right tibial nerve  Vascular Exam: performed Dorsalis pedis pulses were present bilaterally  Posterior tibial pulses were present bilaterally  Elevation Pallor: absent bilaterally  Capillary refill time was less than 1 second bilaterally  Edema: moderate bilaterally-- and-- 4/7 pitting  neg  buddy  Toenails: All of the toenails were elongated,-- hypertrophied,-- discolored,-- ingrown,-- shown to have subungual debris,-- tender-- and-- Mycotic with onychauxis  Hyperkeratosis: present on both first toes,-- present on both first sub metatarsals-- and-- present on both fifth sub metatarsals  Shoe Gear Evaluation: performed ()   Recommendation(s): extra depth diabetic shoes

## 2020-01-06 ENCOUNTER — DOCUMENTATION (OUTPATIENT)
Dept: PULMONOLOGY | Facility: CLINIC | Age: 80
End: 2020-01-06

## 2020-01-06 NOTE — PROGRESS NOTES
Office notes for  90 CPAP insurance compliance period have been faxed to [address]  on 1/6/2020

## 2020-01-07 LAB
BUN SERPL-MCNC: 22 MG/DL (ref 8–27)
BUN/CREAT SERPL: 16 (ref 10–24)
CALCIUM SERPL-MCNC: 9.6 MG/DL (ref 8.6–10.2)
CHLORIDE SERPL-SCNC: 101 MMOL/L (ref 96–106)
CO2 SERPL-SCNC: 21 MMOL/L (ref 20–29)
CREAT SERPL-MCNC: 1.37 MG/DL (ref 0.76–1.27)
GLUCOSE SERPL-MCNC: 161 MG/DL (ref 65–99)
POTASSIUM SERPL-SCNC: 4.4 MMOL/L (ref 3.5–5.2)
SL AMB EGFR AFRICAN AMERICAN: 56 ML/MIN/1.73
SL AMB EGFR NON AFRICAN AMERICAN: 49 ML/MIN/1.73
SODIUM SERPL-SCNC: 140 MMOL/L (ref 134–144)

## 2020-01-08 ENCOUNTER — PATIENT OUTREACH (OUTPATIENT)
Dept: CASE MANAGEMENT | Facility: OTHER | Age: 80
End: 2020-01-08

## 2020-01-08 NOTE — PROGRESS NOTES
Received an in basket notification that the patient called the department number asking to speak to a care manager  Returned the patient's call & introduced myself  He states he has been having hard stool & has to strain  He is taking Metamucil as ordered & states he daughter bought him a powder that mixes clear, not miralax, without result  Recommended he try Colace, increase his activity, & increase his fluids (he shares urine is yellow)  He shares that his current weight is 321 lbs thanks to an improved diet that his daughter manages  He would like a little eddie way especially to eat out once in a while, but understands his daughter's concern for him  Recommended he try to discuss a compromise with her including agreeing on a healthy choice meal before going  He will give it a try  Provided my name & contact info on patient's request  He states it helps him to check in with someone once in a while regarding his health & health management  He will follow up with myself or his PCP directly if he continues to experience constipation

## 2020-01-09 ENCOUNTER — OFFICE VISIT (OUTPATIENT)
Dept: NEPHROLOGY | Facility: CLINIC | Age: 80
End: 2020-01-09
Payer: MEDICARE

## 2020-01-09 VITALS
SYSTOLIC BLOOD PRESSURE: 118 MMHG | DIASTOLIC BLOOD PRESSURE: 76 MMHG | RESPIRATION RATE: 18 BRPM | HEIGHT: 73 IN | WEIGHT: 315 LBS | BODY MASS INDEX: 41.75 KG/M2

## 2020-01-09 DIAGNOSIS — N18.30 BENIGN HYPERTENSION WITH CHRONIC KIDNEY DISEASE, STAGE III (HCC): ICD-10-CM

## 2020-01-09 DIAGNOSIS — E79.0 HYPERURICEMIA: ICD-10-CM

## 2020-01-09 DIAGNOSIS — R80.1 PERSISTENT PROTEINURIA: ICD-10-CM

## 2020-01-09 DIAGNOSIS — N18.30 CKD (CHRONIC KIDNEY DISEASE) STAGE 3, GFR 30-59 ML/MIN (HCC): Primary | ICD-10-CM

## 2020-01-09 DIAGNOSIS — I12.9 BENIGN HYPERTENSION WITH CHRONIC KIDNEY DISEASE, STAGE III (HCC): ICD-10-CM

## 2020-01-09 PROBLEM — I10 ESSENTIAL HYPERTENSION: Status: RESOLVED | Noted: 2019-04-08 | Resolved: 2020-01-09

## 2020-01-09 PROCEDURE — 99214 OFFICE O/P EST MOD 30 MIN: CPT | Performed by: INTERNAL MEDICINE

## 2020-01-09 NOTE — PROGRESS NOTES
NEPHROLOGY OUTPATIENT PROGRESS NOTE   Estephania Finley 78 y o  male MRN: [id number]  DATE: 1/9/2020  Reason for visit:   Chief Complaint   Patient presents with    Follow-up    Chronic Kidney Disease     ASSESSMENT and PLAN:  CKD stage III, baseline serum creatinine 1 3-1 5  -creatinine 1 3 in January 2020 overall stable at baseline   -continue current Lasix 40 mg b i d   - urinalysis in September 2019 shows 0 to 2 RBCs, 11 to 30 WBCs, trace proteinuria  - CKD likely secondary to long-term hypertension causing hypertensive nephrosclerosis, morbid obesity   - Avoid nephrotoxins or NSAIDs     -will do repeat UPC ratio, BMP before next visit     Proteinuria, last UPC ratio improved to 114 mg in September 2019   - repeat UPC ratio as mentioned above  -Could be secondary to underlying hypertension +/- obesity related secondary FSGS component   - May Consider ACE inhibitor eventually  if renal function remains stable although with significant weight loss and improving proteinuria, continue to monitor for now        History of gross hematuria  -Denies any recent episodes of gross hematuria  Follow up with Urology     Hypertension  - blood pressure controlled in the office today  - Salt restricted diet   - continue felodipine   Lasix as below     Leg edema,   -patient has lost about 20 lb since last visit  he continued to lose weight and his weight seems to be around 320 to 222 lb lately  -leg edema seems to be stable  -continue current Lasix 40 mg p o  B i d  - his exertion dyspnea has overall improved  -continue to monitor daily weight and call back if he has continuous weight gain greater than 5 lb in 2 to 3 days  -echocardiogram in April 2019 shows EF 50%, mild to moderate concentric hypertrophy, mild-to-moderate aortic stenosis      Vitamin-D 25 hydroxy level 36 previously at goal , PTH 42 in September 2019   Repeat vitamin-D and PTH level before next visit      Hyperuricemia with history of gout  -serum uric acid 8 0 in September 2019, very slowly improving  allopurinol was increased to 200 mg p  O  Daily and continue same        Morbid obesity,   he has been losing weight as mentioned above        CAD, status post recent PTCA in April 2019 and again in May 2019  Diagnoses and all orders for this visit:    CKD (chronic kidney disease) stage 3, GFR 30-59 ml/min ([facility])  -     Basic metabolic panel; Future  -     CBC; Future  -     Magnesium; Future  -     Phosphorus; Future  -     PTH, intact; Future  -     UA (URINE) with reflex to Scope; Future  -     Protein / creatinine ratio, urine; Future  -     Uric acid; Future  -     Vitamin D 25 hydroxy; Future  -     Basic metabolic panel  -     CBC  -     Magnesium  -     Phosphorus  -     PTH, intact  -     UA (URINE) with reflex to Scope  -     Protein / creatinine ratio, urine  -     Uric acid  -     Vitamin D 25 hydroxy    Benign hypertension with chronic kidney disease, stage III ([facility])    Persistent proteinuria  -     Protein / creatinine ratio, urine; Future  -     Protein / creatinine ratio, urine    Hyperuricemia        SUBJECTIVE / HPI:  Patient is 66-year-old male with significant medical issues of hypertension for many years, CAD, status post PTCA, status post pacemaker placement, morbidly obese, CKD stage III with baseline creatinine 1 3-1 5, gout, AFib, JOVI on BiPAP comes for regular follow-up of CKD   Since last visit, he continued to lose significant weight and has lost 20 lb  His weight at home seems to be around 320-322 lb  Most recent serum creatinine 1 3 overall stable  Currently remains on Lasix 40 mg p o  B i d  Severo Campanile    He denies any urinary complaint at this time  His leg edema is overall stable  Patient is unable to do exercise due to back pain/knee pain issues  No recent NSAID exposure  REVIEW OF SYSTEMS:  More than 10 point review of systems were obtained and discussed in length with the patient   Complete review of systems were negative / unremarkable except mentioned above  PHYSICAL EXAM:  Vitals:    01/09/20 1359 01/09/20 1438   BP: 140/66 118/76   BP Location: Right arm    Patient Position: Sitting    Cuff Size: Large    Resp: 18    Weight: (!) 147 kg (323 lb 12 8 oz)    Height: 6' 1" (1 854 m)      Body mass index is 42 72 kg/m²  Physical Exam   Constitutional: He is oriented to person, place, and time  He appears well-developed and well-nourished  HENT:   Head: Normocephalic and atraumatic  Right Ear: External ear normal    Left Ear: External ear normal    Nose: Nose normal    Eyes: Pupils are equal, round, and reactive to light  Conjunctivae and EOM are normal  No scleral icterus  Neck: Neck supple  No JVD present  Cardiovascular: Normal rate and normal heart sounds  Pulmonary/Chest: Effort normal and breath sounds normal  No respiratory distress  He has no wheezes  He has no rales  Abdominal: Soft  Bowel sounds are normal  He exhibits no distension  There is no tenderness  Musculoskeletal: He exhibits edema (1+ edema in both legs)  He exhibits no tenderness  Neurological: He is alert and oriented to person, place, and time  Skin: Skin is warm and dry  Psychiatric: He has a normal mood and affect  His behavior is normal    Vitals reviewed        PAST MEDICAL HISTORY:  Past Medical History:   Diagnosis Date    Arthritis     Atrial flutter ([facility] [address] [facility]     Chronic kidney disease     stage 3    Coronary artery disease     2 stents    Fluid retention     Gout     Heart failure ([facility] [address] [facility]     pacemaker    Hypertension     Pacemaker     Pulmonary emphysema ([facility] [address] [facility]     Radiculopathy     last assessed 1/28/16     Shortness of breath     exertion    Sleep apnea     c pap       PAST SURGICAL HISTORY:  Past Surgical History:   Procedure Laterality Date    ANGIOPLASTY      x2 2 stents and then replaced   [address]      pacemaker permanent placement dual chamber / last assessed 4/7/14 / implantation     CARDIAC SURGERY      pacemaker    CHOLECYSTECTOMY      CORONARY ANGIOPLASTY WITH STENT PLACEMENT      EPIDURAL BLOCK INJECTION N/A 2016    Procedure: BLOCK / INJECTION EPIDURAL STEROID LUMBAR  L4-5;  Surgeon: Mendel Smith MD;  Location: [facility] MAIN OR;  Service:     EPIDURAL BLOCK INJECTION N/A 2019    Procedure: L4 L5 Lumbar Epidural Steroid Injection;  Surgeon: Mendel Smith MD;  [facility]: [facility] MAIN OR;  Service: Pain Management     EYE SURGERY      cataract left    KNEE ARTHROSCOPY W/ MENISCAL REPAIR Left     LUMBAR EPIDURAL INJECTION N/A 3/17/2016    Procedure: BLOCK / INJECTION LUMBAR  L4-5  (C-ARM); Surgeon: Mendel Smith MD;  Location: [facility] MAIN OR;  Service:        SOCIAL HISTORY:  Social History     Substance and Sexual Activity   Alcohol Use Never    Frequency: Never     Social History     Substance and Sexual Activity   Drug Use No     Social History     Tobacco Use   Smoking Status Former Smoker    Packs/day: 1 00    Years: 50 00    Pack years: 50 00    Types: Cigarettes    Last attempt to quit: 3/27/2019    Years since quittin 7   Smokeless Tobacco Never Used   Tobacco Comment    quit 2 weeks ago       FAMILY HISTORY:  Family History   Problem Relation Age of Onset    Cancer Mother 80    Heart disease Mother     Hypertension Mother     Heart disease Father     Diabetes Neg Hx     Stroke Neg Hx        MEDICATIONS:    Current Outpatient Medications:     albuterol (VENTOLIN HFA) 90 mcg/act inhaler, Inhale 2 puffs every 6 (six) hours as needed for wheezing, Disp: 1 Inhaler, Rfl: 6    allopurinol (ZYLOPRIM) 100 mg tablet, Take 2 tablets (200 mg total) by mouth daily, Disp: 60 tablet, Rfl: 5    ascorbic acid (VITAMIN C) 500 mg tablet, Take 500 mg by mouth daily  , Disp: , Rfl:     B Complex Vitamins (B COMPLEX 1 PO), Take 1 tablet by mouth daily  , Disp: , Rfl:     Biotin (BIOTIN 5000) 5 MG CAPS, Take 1,000 mcg by mouth daily  , Disp: , Rfl:     calcium carbonate-vitamin D (OSCAL-D) 500 mg-200 units per tablet, Take 2 tablets by mouth daily at bedtime  , Disp: , Rfl:     clopidogrel (PLAVIX) 75 mg tablet, Take 1 tablet (75 mg total) by mouth daily, Disp: 90 tablet, Rfl: 3    Cranberry 1000 MG CAPS, Take 1 tablet by mouth 2 (two) times a day , Disp: , Rfl:     cyanocobalamin 1000 MCG tablet, Take 100 mcg by mouth daily, Disp: , Rfl:     dextromethorphan-guaiFENesin (ROBITUSSIN DM)  mg/5 mL syrup, Take 10 mL by mouth every 4 (four) hours as needed for cough, Disp: 118 mL, Rfl: 0    felodipine (PLENDIL) 5 mg 24 hr tablet, Take 1 tablet (5 mg total) by mouth daily, Disp: 90 tablet, Rfl: 3    finasteride (PROSCAR) 5 mg tablet, Take 1 tablet (5 mg total) by mouth daily, Disp: 90 tablet, Rfl: 3    Flaxseed, Linseed, (EQL FLAX SEED OIL) 1000 MG CAPS, Take by mouth daily  , Disp: , Rfl:     fluticasone-vilanterol (BREO ELLIPTA) 100-25 mcg/inh inhaler, Inhale 1 puff daily Rinse mouth after use , Disp: 3 Inhaler, Rfl: 3    furosemide (LASIX) 40 mg tablet, Take 1 tablet (40 mg total) by mouth 2 (two) times a day, Disp: 180 tablet, Rfl: 3    glucosamine-chondroitin 500-400 MG tablet, Take 1 tablet by mouth 2 (two) times a day , Disp: , Rfl:     ipratropium-albuterol (DUO-NEB) 0 5-2 5 mg/3 mL nebulizer solution, Take 1 vial (3 mL total) by nebulization 3 (three) times a day, Disp: 270 mL, Rfl: 9    lactase (LACTAID) 3,000 units tablet, Take 3,000 Units by mouth as needed  , Disp: , Rfl:     Omega-3 Fatty Acids (FISH OIL) 1,000 mg, Take 1,000 mg by mouth 2 (two) times a day , Disp: , Rfl:     PARoxetine (PAXIL) 20 mg tablet, TAKE 1/2 TABLET BY MOUTH DAILY, Disp: 30 tablet, Rfl: 0    psyllium (METAMUCIL) 58 6 % powder, Take 1 packet by mouth daily as needed , Disp: , Rfl:     rivaroxaban (XARELTO) 15 mg tablet, Take 1 tablet (15 mg total) by mouth daily with breakfast (Patient taking differently: Take 20 mg by mouth daily with breakfast ), Disp: , Rfl:     simvastatin (ZOCOR) 20 mg tablet, Take 1 tablet (20 mg total) by mouth daily at bedtime, Disp: 90 tablet, Rfl: 3    traMADol (ULTRAM) 50 mg tablet, Take 50 mg by mouth every 6 (six) hours as needed for moderate pain, Disp: , Rfl:     colchicine (COLCRYS) 0 6 mg tablet, Take 1 tablet (0 6 mg total) by mouth 2 (two) times a day Take twice daily x 7 days as needed for gout, Disp: 30 tablet, Rfl: 5    fluocinonide (LIDEX) 0 05 % cream, Apply topically 2 (two) times a day for 90 days (Patient not taking: Reported on 1/9/2020), Disp: 120 g, Rfl: 2    fluticasone-salmeterol (ADVAIR) 250-50 mcg/dose inhaler, Inhale 1 puff 2 (two) times a day Rinse mouth after use  (Patient not taking: Reported on 11/5/2019), Disp: 1 Inhaler, Rfl: 6    Lab Results:   Creatinine 1 3 in January 2020

## 2020-01-13 ENCOUNTER — OFFICE VISIT (OUTPATIENT)
Dept: PULMONOLOGY | Facility: MEDICAL CENTER | Age: 80
End: 2020-01-13
Payer: MEDICARE

## 2020-01-13 ENCOUNTER — TELEPHONE (OUTPATIENT)
Dept: FAMILY MEDICINE CLINIC | Facility: CLINIC | Age: 80
End: 2020-01-13

## 2020-01-13 VITALS
HEART RATE: 72 BPM | BODY MASS INDEX: 41.75 KG/M2 | TEMPERATURE: 96.8 F | WEIGHT: 315 LBS | HEIGHT: 73 IN | OXYGEN SATURATION: 95 % | DIASTOLIC BLOOD PRESSURE: 62 MMHG | SYSTOLIC BLOOD PRESSURE: 104 MMHG | RESPIRATION RATE: 12 BRPM

## 2020-01-13 DIAGNOSIS — E78.2 MIXED HYPERLIPIDEMIA: ICD-10-CM

## 2020-01-13 DIAGNOSIS — I25.10 CORONARY ARTERY DISEASE INVOLVING NATIVE CORONARY ARTERY OF NATIVE HEART WITHOUT ANGINA PECTORIS: ICD-10-CM

## 2020-01-13 DIAGNOSIS — E66.01 MORBID OBESITY (HCC): ICD-10-CM

## 2020-01-13 DIAGNOSIS — J41.8 MIXED SIMPLE AND MUCOPURULENT CHRONIC BRONCHITIS (HCC): ICD-10-CM

## 2020-01-13 DIAGNOSIS — N18.30 CKD (CHRONIC KIDNEY DISEASE) STAGE 3, GFR 30-59 ML/MIN (HCC): Primary | ICD-10-CM

## 2020-01-13 DIAGNOSIS — I48.20 CHRONIC A-FIB (HCC): ICD-10-CM

## 2020-01-13 DIAGNOSIS — M54.16 LUMBAR RADICULOPATHY: Primary | ICD-10-CM

## 2020-01-13 DIAGNOSIS — G47.33 OSA (OBSTRUCTIVE SLEEP APNEA): ICD-10-CM

## 2020-01-13 DIAGNOSIS — Z87.891 FORMER SMOKER: Primary | ICD-10-CM

## 2020-01-13 PROCEDURE — 99214 OFFICE O/P EST MOD 30 MIN: CPT | Performed by: NURSE PRACTITIONER

## 2020-01-13 RX ORDER — FLUTICASONE FUROATE AND VILANTEROL 100; 25 UG/1; UG/1
1 POWDER RESPIRATORY (INHALATION) DAILY
Qty: 3 INHALER | Refills: 3 | Status: SHIPPED | OUTPATIENT
Start: 2020-01-13 | End: 2020-03-19 | Stop reason: SDUPTHER

## 2020-01-13 NOTE — ASSESSMENT & PLAN NOTE
Patient has a history of severe obstructive sleep apnea  Compliance data is reviewed for the last 30 days  Is 100% compliant with average usage of 8 hours and 12 minutes usage over 4 hours is 100%  His average IPAP is 20 5 in average EPAP of 16 5  His AHI is reduced to 0 6  He is stable and continues to use this

## 2020-01-13 NOTE — ASSESSMENT & PLAN NOTE
Patient has a history of chronic bronchitis  He was a smoker most of his adult life  He quit smoking in the spring of 2019  His last PFT was done in May of 2019 for which a moderately severe restrictive impairment was noted  His forced vital capacity was 2 71 L or 58% of predicted, FEV1 was 2 03 L or 61% of predicted obstruction ratio 75%  Patient is currently on optimal therapy  He is on Breo 100 mcg 1 puff daily  He also has DuoNeb that he uses twice and at x3 times daily this is been helpful  According to his wife, he has been doing well  She feels he is improving and shortness of Breath  He is stable at this time

## 2020-01-13 NOTE — ASSESSMENT & PLAN NOTE
Patient has a history of being a former smoker  I personally reviewed images with patient and his wife that was done in August 2019  There was mild centrilobular emphysema noted is bilaterally in the lower lobes  Otherwise no active pulmonary disease  Patient is currently smoke-free and should be applauded for his efforts

## 2020-01-13 NOTE — TELEPHONE ENCOUNTER
Called patient to let him know that neurosurgical referral was generated and we are putting it in the mail  Wife asked for order to have A1c checked  Generated and mailed  No further action needed at this time    Ike Kocher, MA

## 2020-01-13 NOTE — PROGRESS NOTES
Assessment/Plan:     Problem List Items Addressed This Visit        Respiratory    JOVI (obstructive sleep apnea)     Patient has a history of severe obstructive sleep apnea  Compliance data is reviewed for the last 30 days  Is 100% compliant with average usage of 8 hours and 12 minutes usage over 4 hours is 100%  His average IPAP is 20 5 in average EPAP of 16 5  His AHI is reduced to 0 6  He is stable and continues to use this  Mixed simple and mucopurulent chronic bronchitis ([address] )     Patient has a history of chronic bronchitis  He was a smoker most of his adult life  He quit smoking in the spring of 2019  His last PFT was done in May of 2019 for which a moderately severe restrictive impairment was noted  His forced vital capacity was 2 71 L or 58% of predicted, FEV1 was 2 03 L or 61% of predicted obstruction ratio 75%  Patient is currently on optimal therapy  He is on Breo 100 mcg 1 puff daily  He also has DuoNeb that he uses twice and at x3 times daily this is been helpful  According to his wife, he has been doing well  She feels he is improving and shortness of Breath  He is stable at this time  Relevant Medications    fluticasone-vilanterol (BREO ELLIPTA) 100-25 mcg/inh inhaler       Other    Former smoker - Primary     Patient has a history of being a former smoker  I personally reviewed images with patient and his wife that was done in August 2019  There was mild centrilobular emphysema noted is bilaterally in the lower lobes  Otherwise no active pulmonary disease  Patient is currently smoke-free and should be applauded for his efforts  Return in about 6 months (around 7/13/2020)  All questions are answered to the patient's satisfaction and understanding  He verbalizes understanding  He is encouraged to call with any further questions or concerns  Portions of the record may have been created with voice recognition software    Occasional wrong word or "sound a like" substitutions may have occurred due to the inherent limitations of voice recognition software  Read the chart carefully and recognize, using context, where substitutions have occurred  HPI:  Alessandro Toro is a 66-year-old male who was last seen in the office November 5th 2019  He has a history of severe obstructive sleep apnea and is currently using auto BiPAP  He was compliant in the usage but had a large air leak in since that time has received a new mask  He also has history of chronic bronchitis he smoked for 50+ years and quit in the spring of 2019  He currently is using Breo 100 mg once daily  Electronically Signed by Becca Chi, NAVA    ______________________________________________________________________    Chief Complaint:   Chief Complaint   Patient presents with    Shortness of Breath     good  no cough or wheeze     Sleep Apnea     no issues with machine        Patient ID: Alessandro Toro is a 78 y o  y o  male has a past medical history of Arthritis, Atrial flutter ([address] ), Chronic kidney disease, Coronary artery disease, Fluid retention, Gout, Heart failure ([address] ), Hypertension, Pacemaker, Pulmonary emphysema ([address] ), Radiculopathy, Shortness of breath, and Sleep apnea  1/13/2020  Patient presents today for follow-up visit  Shortness of Breath   This is a chronic problem  The current episode started more than 1 year ago  The problem occurs daily  The problem has been gradually improving  Associated symptoms include wheezing  The symptoms are aggravated by exercise  The patient has no known risk factors (On Xarelto) for DVT/PE  He has tried beta agonist inhalers, ipratropium inhalers, rest and steroid inhalers for the symptoms  The treatment provided mild relief  His past medical history is significant for COPD  Wheezing    This is a chronic problem  The problem occurs daily  The problem has been gradually improving  Associated symptoms include shortness of breath   The symptoms are aggravated by exercise  He has tried beta agonist inhalers, ipratropium inhalers and rest for the symptoms  The treatment provided mild relief  His past medical history is significant for COPD  Review of Systems   Constitutional: Negative  HENT: Negative  Eyes: Negative  Respiratory: Positive for shortness of breath and wheezing  Cardiovascular: Negative  Gastrointestinal: Negative  Endocrine: Negative  Genitourinary: Negative  Musculoskeletal: Negative  Skin: Negative  Allergic/Immunologic: Negative  Neurological: Negative  Hematological: Negative  Psychiatric/Behavioral: Negative  Smoking history: He reports that he quit smoking about 9 months ago  His smoking use included cigarettes  He has a 50 00 pack-year smoking history  He has never used smokeless tobacco     The following portions of the patient's history were reviewed and updated as appropriate: current medications, past family history, past medical history, past social history, past surgical history and problem list     Immunization History   Administered Date(s) Administered    Influenza Split High Dose Preservative Free IM 12/03/2013, 10/23/2015    Influenza TIV (IM) 11/05/2001, 12/15/2009, 01/20/2011, 10/19/2011, 10/01/2012    Influenza, high dose seasonal 0 5 mL 10/01/2018, 10/10/2019    Pneumococcal Conjugate 13-Valent 07/09/2019    Pneumococcal Polysaccharide PPV23 03/26/2013    Tdap 04/01/2016, 04/03/2016    Zoster 10/01/2012     Current Outpatient Medications   Medication Sig Dispense Refill    albuterol (VENTOLIN HFA) 90 mcg/act inhaler Inhale 2 puffs every 6 (six) hours as needed for wheezing 1 Inhaler 6    allopurinol (ZYLOPRIM) 100 mg tablet Take 2 tablets (200 mg total) by mouth daily 60 tablet 5    ascorbic acid (VITAMIN C) 500 mg tablet Take 500 mg by mouth daily   B Complex Vitamins (B COMPLEX 1 PO) Take 1 tablet by mouth daily        Biotin (BIOTIN 5000) 5 MG CAPS Take 1,000 mcg by mouth daily   calcium carbonate-vitamin D (OSCAL-D) 500 mg-200 units per tablet Take 2 tablets by mouth daily at bedtime   clopidogrel (PLAVIX) 75 mg tablet Take 1 tablet (75 mg total) by mouth daily 90 tablet 3    colchicine (COLCRYS) 0 6 mg tablet Take 1 tablet (0 6 mg total) by mouth 2 (two) times a day Take twice daily x 7 days as needed for gout 30 tablet 5    Cranberry 1000 MG CAPS Take 1 tablet by mouth 2 (two) times a day   cyanocobalamin 1000 MCG tablet Take 100 mcg by mouth daily      dextromethorphan-guaiFENesin (ROBITUSSIN DM)  mg/5 mL syrup Take 10 mL by mouth every 4 (four) hours as needed for cough 118 mL 0    felodipine (PLENDIL) 5 mg 24 hr tablet Take 1 tablet (5 mg total) by mouth daily 90 tablet 3    finasteride (PROSCAR) 5 mg tablet Take 1 tablet (5 mg total) by mouth daily 90 tablet 3    Flaxseed, Linseed, (EQL FLAX SEED OIL) 1000 MG CAPS Take by mouth daily   fluticasone-vilanterol (BREO ELLIPTA) 100-25 mcg/inh inhaler Inhale 1 puff daily Rinse mouth after use  3 Inhaler 3    furosemide (LASIX) 40 mg tablet Take 1 tablet (40 mg total) by mouth 2 (two) times a day 180 tablet 3    glucosamine-chondroitin 500-400 MG tablet Take 1 tablet by mouth 2 (two) times a day   ipratropium-albuterol (DUO-NEB) 0 5-2 5 mg/3 mL nebulizer solution Take 1 vial (3 mL total) by nebulization 3 (three) times a day 270 mL 9    lactase (LACTAID) 3,000 units tablet Take 3,000 Units by mouth as needed   Omega-3 Fatty Acids (FISH OIL) 1,000 mg Take 1,000 mg by mouth 2 (two) times a day        PARoxetine (PAXIL) 20 mg tablet TAKE 1/2 TABLET BY MOUTH DAILY 30 tablet 0    psyllium (METAMUCIL) 58 6 % powder Take 1 packet by mouth daily as needed       rivaroxaban (XARELTO) 15 mg tablet Take 1 tablet (15 mg total) by mouth daily with breakfast (Patient taking differently: Take 20 mg by mouth daily with breakfast )      simvastatin (ZOCOR) 20 mg tablet Take 1 tablet (20 mg total) by mouth daily at bedtime 90 tablet 3    traMADol (ULTRAM) 50 mg tablet Take 50 mg by mouth every 6 (six) hours as needed for moderate pain      fluocinonide (LIDEX) 0 05 % cream Apply topically 2 (two) times a day for 90 days (Patient not taking: Reported on 1/9/2020) 120 g 2    fluticasone-salmeterol (ADVAIR) 250-50 mcg/dose inhaler Inhale 1 puff 2 (two) times a day Rinse mouth after use  (Patient not taking: Reported on 1/13/2020) 1 Inhaler 6    fluticasone-vilanterol (BREO ELLIPTA) 100-25 mcg/inh inhaler Inhale 1 puff daily Rinse mouth after use  3 Inhaler 3     No current facility-administered medications for this visit  Allergies: Patient has no known allergies  Objective:  Vitals:    01/13/20 0806   BP: 104/62   BP Location: Left arm   Patient Position: Sitting   Cuff Size: Standard   Pulse: 72   Resp: 12   Temp: (!) 96 8 °F (36 °C)   TempSrc: Tympanic   SpO2: 95%   Weight: (!) 149 kg (328 lb)   Height: 6' 1" (1 854 m)   Oxygen Therapy  SpO2: 95 %    Wt Readings from Last 3 Encounters:   01/13/20 (!) 149 kg (328 lb)   01/09/20 (!) 147 kg (323 lb 12 8 oz)   12/26/19 (!) 151 kg (332 lb)     Body mass index is 43 27 kg/m²  Physical Exam   Constitutional: He is oriented to person, place, and time  BMI 43   HENT:   Head: Normocephalic and atraumatic  Mouth/Throat: Oropharynx is clear and moist    Mallampati 4   Eyes: Pupils are equal, round, and reactive to light  EOM are normal    Neck: Normal range of motion  Cardiovascular: Normal rate and regular rhythm  Pulmonary/Chest: Effort normal and breath sounds normal  Tachypnea noted  Abdominal: Soft  Musculoskeletal: Normal range of motion  Right lower leg: He exhibits edema  Neurological: He is alert and oriented to person, place, and time  Skin: Skin is warm  Capillary refill takes less than 2 seconds  Psychiatric: He has a normal mood and affect   His behavior is normal        Lab Review:   No visits with results within 6 Month(s) from this visit  Latest known visit with results is:   No results displayed because visit has over 200 results  Diagnostics:  I have personally reviewed pertinent films in PACS  Office Spirometry Results:     ESS:    No results found

## 2020-01-17 ENCOUNTER — PATIENT OUTREACH (OUTPATIENT)
Dept: CASE MANAGEMENT | Facility: OTHER | Age: 80
End: 2020-01-17

## 2020-01-17 NOTE — PROGRESS NOTES
Followed up with the patient regarding his constipation  He states he hasn't had to take any stool softeners since we last spoke  He is drinking more fluids including apple cider which he said has helped him in the past  Also recommended prunes & prune juice  He states he gets a protein shake with prunes every morning  Patient states he is dedicated to improving his health through diet & activity with his family's support & he also has a great deal of gratitude for Michael Rudolph who was his OP CM following his hospitalization  He has no other needs or questions at this time, but has my number & states he will call as needed

## 2020-01-27 DIAGNOSIS — N42.9 PROSTATE DISORDER: ICD-10-CM

## 2020-01-27 RX ORDER — FINASTERIDE 5 MG/1
5 TABLET, FILM COATED ORAL DAILY
Qty: 90 TABLET | Refills: 3 | Status: SHIPPED | OUTPATIENT
Start: 2020-01-27 | End: 2020-03-19 | Stop reason: SDUPTHER

## 2020-01-29 LAB
EST. AVERAGE GLUCOSE BLD GHB EST-MCNC: 131 MG/DL
HBA1C MFR BLD: 6.2 % (ref 4.8–5.6)

## 2020-02-03 ENCOUNTER — PATIENT OUTREACH (OUTPATIENT)
Dept: CASE MANAGEMENT | Facility: OTHER | Age: 80
End: 2020-02-03

## 2020-02-03 NOTE — PROGRESS NOTES
Patient called & left a message requesting a call back  Returned patient's call  He states he no longer has something to discuss

## 2020-02-06 ENCOUNTER — CONSULT (OUTPATIENT)
Dept: NEUROSURGERY | Facility: CLINIC | Age: 80
End: 2020-02-06
Payer: MEDICARE

## 2020-02-06 VITALS
BODY MASS INDEX: 41.75 KG/M2 | HEART RATE: 69 BPM | HEIGHT: 73 IN | TEMPERATURE: 97.8 F | SYSTOLIC BLOOD PRESSURE: 121 MMHG | RESPIRATION RATE: 16 BRPM | DIASTOLIC BLOOD PRESSURE: 52 MMHG | WEIGHT: 315 LBS

## 2020-02-06 DIAGNOSIS — R20.2 PARESTHESIA OF BOTH FEET: Primary | ICD-10-CM

## 2020-02-06 DIAGNOSIS — M54.16 LUMBAR RADICULOPATHY: ICD-10-CM

## 2020-02-06 PROCEDURE — 99203 OFFICE O/P NEW LOW 30 MIN: CPT | Performed by: PHYSICIAN ASSISTANT

## 2020-02-06 NOTE — PROGRESS NOTES
Assessment/Plan:  Patient is a 78years old morbidly obese patient referred for evaluation of longstanding lower back pain  and bilateral feet paresthesia  He reports his back pain improved after acupuncture since October 2019, but he persisted to have pressure feeling and paresthesia in the feet  Denies any shooting down leg pain, but reports occasional distal leg pain  Has bilateral lower leg swelling  He reports he had duplex ultrasound of both legs and DVT ruled out  Tried epidural steroid injection without improvement but reports improvement was oral steroid pills  Patient has history of hypertension, status post cardiac stent and pacemaker placement on double anticoagulant is Plavix and Xarelto  Patient reports occasional gait instability but denies fall down incident or bowel/ bladder issues  Neuro exam-morbidly obese patient, Nelli, strength 5/5 bilateral   Sensation to light touch intact lower extremities and decreased in the feet  DTR 2+ without clonus and Babinski negative bilaterally  Bilateral lower extremity swelling with +1 to 2 pretibial edema  CT of abdomen demonstrates near complete fusion of L1-L2 disc space with progression of endplate sclerosis  No destruction of the endplates to suggest an infectious was nor there is significant impingement upon the thecal sac  Patient not compatible for MRI or CT myelogram as has kidney issues  So EMG of the lower extremities ordered to rule out peripheral neuropathy versus radiculopathy  Hx, PE, images, management plan and prognosis discussed with the patient and his wife  EMG test of lower extremities ordered  Note to his primary care physician sent  Patient advised to follow-up with his PCP and cardiologist for his bilateral lower extremity swelling  Addressed concern that it patient falls patient is on double anticoagulant medications and also discuss with his cardiologist to consider discontinuing 1 of the anticoagulants if possible  Patient to follow-up after EMG test results  Plan:   1  Patient cannot have MRI because of his pacemaker and also can't have CT myelogram because of his kidney issues  2  Bilateral lower extremity EMG tests  3  Note sent to his PCP  4  Call office after Follow up after EMG results    Subjective: "My PT is hurting my back improved after acupuncture"        HPI  Patient ID: Viry Mustafa is a 78 y o  male with history of hypertension, chronic kidney disease chronic lower back pain and bilateral feet pressure-type feeling and also paresthesia  Patient reports he has difficulty walking because of the paresthesia feeling in his feet  He recently got acupuncture treatment and improved with his back pain  Denies any bowel/bladder dysfunction  Patient reports staggering gait, but denies any history of falls  Denies using walker or cane  He tried epidural steroid injection but without improvement; but noticed that oral steroid helped him with pain in his feet and back  Patient denies history of fever, chills, rigors, cough or chest pain  Denies history of diabetes mellitus but has history of hypertension  He is status post cardiac stent and pacemaker placement on Plavix and Xarelto  Denies history of smoking cigarettes or drinking alcohol  CT of lumbar spine demonstrates a near complete fusion of L1-L2 disc space with progression of endplate sclerosis but no endplate destruction suggestive of infection  No nerve  Impingement noted  Personally reviewed and updated  Review of Systems   Constitutional: Negative  HENT: Negative  Eyes: Negative  Respiratory: Positive for shortness of breath and wheezing  Cardiovascular: Negative  Gastrointestinal: Negative  Endocrine: Negative  Genitourinary: Negative  Musculoskeletal: Positive for back pain (to b/l legs) and gait problem  Skin: Negative  Allergic/Immunologic: Negative  Neurological: Negative for weakness and numbness  Hematological: Bruises/bleeds easily (fish oil and plavix)  Psychiatric/Behavioral: Positive for sleep disturbance (cant fall asleep not due to pain)  Objective:      /52 (BP Location: Left arm, Patient Position: Sitting, Cuff Size: Standard)   Pulse 69   Temp 97 8 °F (36 6 °C) (Tympanic)   Resp 16   Ht 6' 1" (1 854 m)   Wt (!) 144 kg (318 lb)   BMI 41 96 kg/m²          Physical Exam   Constitutional: He is oriented to person, place, and time  He appears well-developed and well-nourished  Eyes: EOM are normal    Neck: Normal range of motion  Cardiovascular: Normal rate  Pulmonary/Chest: Effort normal    Musculoskeletal: Normal range of motion  Neurological: He is alert and oriented to person, place, and time  He has normal strength and normal reflexes  No cranial nerve deficit or sensory deficit  GCS eye subscore is 4  GCS verbal subscore is 5  GCS motor subscore is 6  He displays no Babinski's sign on the right side  He displays no Babinski's sign on the left side  Strength 5/5 throughout, sensation to light touch is normal bilaterally  DTR 2+ and without clonus and Babinski negative bilaterally  +1 to 2 pedal and pretibial edema noted    Nontender on lumbar palpation

## 2020-02-25 ENCOUNTER — TELEPHONE (OUTPATIENT)
Dept: PULMONOLOGY | Facility: MEDICAL CENTER | Age: 80
End: 2020-02-25

## 2020-02-25 NOTE — TELEPHONE ENCOUNTER
Patient would like a call back from you  I asked him what it was in regards to and he had just said you know who he is  Can you please call patient  He informed me to tell you to listen to his cell phone prompts when calling him back and to press 1

## 2020-02-26 ENCOUNTER — TELEPHONE (OUTPATIENT)
Dept: PULMONOLOGY | Facility: MEDICAL CENTER | Age: 80
End: 2020-02-26

## 2020-02-26 NOTE — TELEPHONE ENCOUNTER
Patient is returning your call     He said when you call press "1" otherwise he wont hear it ring bc he has unknown numbers blocked from calling

## 2020-03-13 DIAGNOSIS — E79.0 HYPERURICEMIA: ICD-10-CM

## 2020-03-13 RX ORDER — ALLOPURINOL 100 MG/1
200 TABLET ORAL DAILY
Qty: 60 TABLET | Refills: 5 | Status: SHIPPED | OUTPATIENT
Start: 2020-03-13 | End: 2020-03-16 | Stop reason: DRUGHIGH

## 2020-03-13 NOTE — TELEPHONE ENCOUNTER
Requested medication(s) are due for refill today: Yes  Patient has already received a courtesy refill: No  Other reason request has been forwarded to provider:Failed protocol

## 2020-03-16 DIAGNOSIS — I48.20 CHRONIC ATRIAL FIBRILLATION (HCC): ICD-10-CM

## 2020-03-16 DIAGNOSIS — E79.0 HYPERURICEMIA: Primary | ICD-10-CM

## 2020-03-16 DIAGNOSIS — F06.4 ANXIETY DISORDER DUE TO MEDICAL CONDITION: ICD-10-CM

## 2020-03-16 RX ORDER — FUROSEMIDE 40 MG/1
40 TABLET ORAL 2 TIMES DAILY
Qty: 180 TABLET | Refills: 3 | Status: SHIPPED | OUTPATIENT
Start: 2020-03-16 | End: 2020-07-10

## 2020-03-16 RX ORDER — PAROXETINE HYDROCHLORIDE 20 MG/1
TABLET, FILM COATED ORAL
Qty: 45 TABLET | Refills: 4 | Status: SHIPPED | OUTPATIENT
Start: 2020-03-16 | End: 2020-09-21 | Stop reason: SDUPTHER

## 2020-03-16 RX ORDER — ALLOPURINOL 100 MG/1
200 TABLET ORAL DAILY
Qty: 60 TABLET | Refills: 5 | Status: SHIPPED | OUTPATIENT
Start: 2020-03-16 | End: 2020-07-10 | Stop reason: SDUPTHER

## 2020-03-16 NOTE — TELEPHONE ENCOUNTER
Requested medication(s) are due for refill today: Yes  Patient has already received a courtesy refill: No  Other reason request has been forwarded to provider:Sent to Dr José Miguel Vera

## 2020-03-19 ENCOUNTER — OFFICE VISIT (OUTPATIENT)
Dept: FAMILY MEDICINE CLINIC | Facility: CLINIC | Age: 80
End: 2020-03-19
Payer: MEDICARE

## 2020-03-19 VITALS
TEMPERATURE: 98.8 F | HEART RATE: 70 BPM | OXYGEN SATURATION: 96 % | BODY MASS INDEX: 41.08 KG/M2 | HEIGHT: 73 IN | SYSTOLIC BLOOD PRESSURE: 134 MMHG | DIASTOLIC BLOOD PRESSURE: 66 MMHG | WEIGHT: 310 LBS | RESPIRATION RATE: 18 BRPM

## 2020-03-19 DIAGNOSIS — I25.10 CORONARY ARTERY ARTERIOSCLEROSIS: ICD-10-CM

## 2020-03-19 DIAGNOSIS — I10 ESSENTIAL HYPERTENSION: ICD-10-CM

## 2020-03-19 DIAGNOSIS — B35.9 DERMATOPHYTOSIS: ICD-10-CM

## 2020-03-19 DIAGNOSIS — N42.9 PROSTATE DISORDER: ICD-10-CM

## 2020-03-19 PROCEDURE — 1036F TOBACCO NON-USER: CPT | Performed by: FAMILY MEDICINE

## 2020-03-19 PROCEDURE — 3078F DIAST BP <80 MM HG: CPT | Performed by: FAMILY MEDICINE

## 2020-03-19 PROCEDURE — 3075F SYST BP GE 130 - 139MM HG: CPT | Performed by: FAMILY MEDICINE

## 2020-03-19 PROCEDURE — 99214 OFFICE O/P EST MOD 30 MIN: CPT | Performed by: FAMILY MEDICINE

## 2020-03-19 PROCEDURE — 4040F PNEUMOC VAC/ADMIN/RCVD: CPT | Performed by: FAMILY MEDICINE

## 2020-03-19 PROCEDURE — 3008F BODY MASS INDEX DOCD: CPT | Performed by: FAMILY MEDICINE

## 2020-03-19 PROCEDURE — 1160F RVW MEDS BY RX/DR IN RCRD: CPT | Performed by: FAMILY MEDICINE

## 2020-03-19 RX ORDER — SIMVASTATIN 20 MG
20 TABLET ORAL
Qty: 90 TABLET | Refills: 3 | Status: SHIPPED | OUTPATIENT
Start: 2020-03-19 | End: 2020-09-21 | Stop reason: SDUPTHER

## 2020-03-19 RX ORDER — FINASTERIDE 5 MG/1
5 TABLET, FILM COATED ORAL DAILY
Qty: 90 TABLET | Refills: 3 | Status: SHIPPED | OUTPATIENT
Start: 2020-03-19 | End: 2021-06-25 | Stop reason: SDUPTHER

## 2020-03-19 RX ORDER — FELODIPINE 5 MG/1
5 TABLET, EXTENDED RELEASE ORAL DAILY
Qty: 90 TABLET | Refills: 3 | Status: SHIPPED | OUTPATIENT
Start: 2020-03-19 | End: 2020-09-21 | Stop reason: SDUPTHER

## 2020-03-19 RX ORDER — CLOPIDOGREL BISULFATE 75 MG/1
75 TABLET ORAL DAILY
Qty: 90 TABLET | Refills: 3 | Status: SHIPPED | OUTPATIENT
Start: 2020-03-19 | End: 2020-09-21 | Stop reason: SDUPTHER

## 2020-03-19 NOTE — PROGRESS NOTES
Chief Complaint   Patient presents with    lump on side     left side  jmcma       Subjective:           Problem List Items Addressed This Visit        Musculoskeletal and Integument    Dermatophytosis    Relevant Medications    fluocinonide (LIDEX) 0 05 % cream      Other Visit Diagnoses     Prostate disorder        Relevant Medications    finasteride (PROSCAR) 5 mg tablet    Coronary artery arteriosclerosis        Relevant Medications    clopidogrel (PLAVIX) 75 mg tablet    felodipine (PLENDIL) 5 mg 24 hr tablet    simvastatin (ZOCOR) 20 mg tablet    Essential hypertension        Relevant Medications    felodipine (PLENDIL) 5 mg 24 hr tablet              No orders of the defined types were placed in this encounter  There are no Patient Instructions on file for this visit  BMI Counseling: Body mass index is 40 9 kg/m²  Discussed the patient's BMI with him  The BMI is above normal  Nutrition recommendations include reducing portion sizes, 3-5 servings of fruits/vegetables daily and decreasing soda and/or juice intake  Lana Plaster    Chief Complaint   Patient presents with    lump on side     left side   jmcma     HPI  Mood is in for evaluation lump on left side he has been exercising and losing weight with diet he has a history of CKD neuropathy following with neurology nephrology he has history of gout asthma/COPD CAD 2 stents sleep apnea hypertension on multiple medications  /66   Pulse 70   Temp 98 8 °F (37 1 °C)   Resp 18   Ht 6' 1" (1 854 m)   Wt (!) 141 kg (310 lb)   SpO2 96%   BMI 40 90 kg/m²       No Known Allergies    Current Outpatient Medications on File Prior to Visit   Medication Sig Dispense Refill    albuterol (VENTOLIN HFA) 90 mcg/act inhaler Inhale 2 puffs every 6 (six) hours as needed for wheezing 1 Inhaler 6    allopurinol (ZYLOPRIM) 100 mg tablet Take 2 tablets (200 mg total) by mouth daily 60 tablet 5    ascorbic acid (VITAMIN C) 500 mg tablet Take 500 mg by mouth daily       B Complex Vitamins (B COMPLEX 1 PO) Take 1 tablet by mouth daily   Biotin (BIOTIN 5000) 5 MG CAPS Take 1,000 mcg by mouth daily   calcium carbonate-vitamin D (OSCAL-D) 500 mg-200 units per tablet Take 2 tablets by mouth daily at bedtime   colchicine (COLCRYS) 0 6 mg tablet Take 1 tablet (0 6 mg total) by mouth 2 (two) times a day Take twice daily x 7 days as needed for gout 30 tablet 5    Cranberry 1000 MG CAPS Take 1 tablet by mouth 2 (two) times a day   cyanocobalamin 1000 MCG tablet Take 100 mcg by mouth daily      dextromethorphan-guaiFENesin (ROBITUSSIN DM)  mg/5 mL syrup Take 10 mL by mouth every 4 (four) hours as needed for cough 118 mL 0    Flaxseed, Linseed, (EQL FLAX SEED OIL) 1000 MG CAPS Take by mouth daily   fluticasone-salmeterol (ADVAIR) 250-50 mcg/dose inhaler Inhale 1 puff 2 (two) times a day Rinse mouth after use  1 Inhaler 6    fluticasone-vilanterol (BREO ELLIPTA) 100-25 mcg/inh inhaler Inhale 1 puff daily Rinse mouth after use  3 Inhaler 3    furosemide (LASIX) 40 mg tablet Take 1 tablet (40 mg total) by mouth 2 (two) times a day 180 tablet 3    glucosamine-chondroitin 500-400 MG tablet Take 1 tablet by mouth 2 (two) times a day   ipratropium-albuterol (DUO-NEB) 0 5-2 5 mg/3 mL nebulizer solution Take 1 vial (3 mL total) by nebulization 3 (three) times a day 270 mL 9    lactase (LACTAID) 3,000 units tablet Take 3,000 Units by mouth as needed   Omega-3 Fatty Acids (FISH OIL) 1,000 mg Take 1,000 mg by mouth 2 (two) times a day        PARoxetine (PAXIL) 20 mg tablet TAKE 1/2 TABLET BY MOUTH DAILY 45 tablet 4    psyllium (METAMUCIL) 58 6 % powder Take 1 packet by mouth daily as needed       rivaroxaban (XARELTO) 15 mg tablet Take 1 tablet (15 mg total) by mouth daily with breakfast (Patient taking differently: Take 20 mg by mouth daily with breakfast )      traMADol (ULTRAM) 50 mg tablet Take 50 mg by mouth every 6 (six) hours as needed for moderate pain      [DISCONTINUED] clopidogrel (PLAVIX) 75 mg tablet Take 1 tablet (75 mg total) by mouth daily 90 tablet 3    [DISCONTINUED] felodipine (PLENDIL) 5 mg 24 hr tablet Take 1 tablet (5 mg total) by mouth daily 90 tablet 3    [DISCONTINUED] finasteride (PROSCAR) 5 mg tablet Take 1 tablet (5 mg total) by mouth daily 90 tablet 3    [DISCONTINUED] fluocinonide (LIDEX) 0 05 % cream Apply topically 2 (two) times a day for 90 days 120 g 2    [DISCONTINUED] simvastatin (ZOCOR) 20 mg tablet Take 1 tablet (20 mg total) by mouth daily at bedtime 90 tablet 3    [DISCONTINUED] fluticasone-vilanterol (BREO ELLIPTA) 100-25 mcg/inh inhaler Inhale 1 puff daily Rinse mouth after use  3 Inhaler 3     No current facility-administered medications on file prior to visit          Past Medical History:   Diagnosis Date    Arthritis     [facility] flutter [facility]     Chronic kidney disease     stage 3    Coronary artery disease     2 stents    Fluid retention     Gout     Heart failure (HCC)     pacemaker    Hypertension     Pacemaker     [facility]     Radiculopathy     last assessed 1/28/16     Shortness of breath     exertion    Sleep apnea     c pap       Past Surgical History:   Procedure Laterality Date    ANGIOPLASTY      x2 2 stents and then replaced    CARDIAC PACEMAKER PLACEMENT      pacemaker permanent placement dual chamber / last assessed 4/7/14 / implantation     CARDIAC SURGERY      pacemaker    CHOLECYSTECTOMY      CORONARY ANGIOPLASTY WITH STENT PLACEMENT      EPIDURAL BLOCK INJECTION N/A 5/26/2016    Procedure: BLOCK / INJECTION EPIDURAL STEROID LUMBAR  L4-5;  Surgeon: Kiki Hopkins MD;  Location: [place] [address] MAIN OR;  Service:     EPIDURAL BLOCK INJECTION N/A 2/14/2019    Procedure: L4 L5 Lumbar Epidural Steroid Injection;  Surgeon: Kiki Hopkins MD;  Location: [place] [address] MAIN OR;  Service: Pain Management     EYE SURGERY      cataract left    KNEE ARTHROSCOPY W/ MENISCAL REPAIR Left     LUMBAR EPIDURAL INJECTION N/A 3/17/2016    Procedure: BLOCK / INJECTION LUMBAR  L4-5  (C-ARM); Surgeon: Catherine Mercedes MD;  Location: [facility] MAIN OR;  Service:           reports that he quit smoking about a year ago  His smoking use included cigarettes  He has a 50 00 pack-year smoking history  He has never used smokeless tobacco  He reports that he does not drink alcohol or use drugs  reports that he quit smoking about a year ago  His smoking use included cigarettes  He has a 50 00 pack-year smoking history  He has never used smokeless tobacco         Review of Systems   Constitutional: Negative for activity change and fever  HENT: Negative for congestion, nosebleeds and postnasal drip  Respiratory: Negative for cough, chest tightness and wheezing  Cardiovascular: Positive for leg swelling  Negative for chest pain and palpitations  Gastrointestinal: Positive for abdominal distention  Negative for blood in stool and nausea  Genitourinary: Negative for enuresis and hematuria  Musculoskeletal: Positive for arthralgias  Chest wall rib Left no mass   Skin: Negative for rash  Neurological: Negative for dizziness, tremors, seizures and syncope  Hematological: Negative for adenopathy  Psychiatric/Behavioral: Positive for dysphoric mood  Negative for behavioral problems and hallucinations  Physical Exam   Constitutional: He is oriented to person, place, and time  obese   HENT:   glasses   Eyes:   glasses   Neck: Neck supple  No tracheal deviation present  Cardiovascular: Normal rate and regular rhythm  Exam reveals no gallop  Murmur heard  1/6   Pulmonary/Chest: Effort normal and breath sounds normal  No respiratory distress  He has no wheezes  He exhibits no tenderness  Abdominal: Soft  Bowel sounds are normal  A hernia is present  Obese    3- 4cm firm reducible hernia ventral   Musculoskeletal: He exhibits no deformity     l rib edge   Neurological: He is alert and oriented to person, place, and time  He exhibits normal muscle tone  Skin: Skin is warm  Capillary refill takes 2 to 3 seconds  Ecchymosis arms hands   Psychiatric: He has a normal mood and affect   His behavior is normal

## 2020-03-26 ENCOUNTER — TELEPHONE (OUTPATIENT)
Dept: NEPHROLOGY | Facility: CLINIC | Age: 80
End: 2020-03-26

## 2020-03-26 NOTE — TELEPHONE ENCOUNTER
Spoke with the patient and she received a letter for the Allopurinol  I spoke with Kasey Stout from [address] [place] [address] and she states she is not sure why the letter was mailed out but the allopurinol is already being shipped to there house and they can disregard the letter  I spoke with patient's wife and she has no further questions or concerns at this time        Agustin Thomas MA

## 2020-03-26 NOTE — TELEPHONE ENCOUNTER
----- Message from Amanda Grijalva sent at 3/26/2020  2:49 PM EDT -----  Wife called  Received letter from insurance that Dr Robersonmercedes Grandchild doesn't want patient on Amlodipine? Please call her

## 2020-04-27 ENCOUNTER — TELEMEDICINE (OUTPATIENT)
Dept: FAMILY MEDICINE CLINIC | Facility: CLINIC | Age: 80
End: 2020-04-27
Payer: MEDICARE

## 2020-04-27 ENCOUNTER — TELEPHONE (OUTPATIENT)
Dept: FAMILY MEDICINE CLINIC | Facility: CLINIC | Age: 80
End: 2020-04-27

## 2020-04-27 DIAGNOSIS — J41.8 MIXED SIMPLE AND MUCOPURULENT CHRONIC BRONCHITIS (HCC): Primary | ICD-10-CM

## 2020-04-27 DIAGNOSIS — I12.9 BENIGN HYPERTENSION WITH CHRONIC KIDNEY DISEASE, STAGE III (HCC): ICD-10-CM

## 2020-04-27 DIAGNOSIS — I48.20 CHRONIC ATRIAL FIBRILLATION (HCC): ICD-10-CM

## 2020-04-27 DIAGNOSIS — I48.20 CHRONIC A-FIB (HCC): ICD-10-CM

## 2020-04-27 DIAGNOSIS — I25.10 CORONARY ARTERY DISEASE INVOLVING NATIVE CORONARY ARTERY OF NATIVE HEART WITHOUT ANGINA PECTORIS: Chronic | ICD-10-CM

## 2020-04-27 DIAGNOSIS — F06.4 ANXIETY DISORDER DUE TO MEDICAL CONDITION: ICD-10-CM

## 2020-04-27 DIAGNOSIS — N18.30 BENIGN HYPERTENSION WITH CHRONIC KIDNEY DISEASE, STAGE III (HCC): ICD-10-CM

## 2020-04-27 PROCEDURE — 99214 OFFICE O/P EST MOD 30 MIN: CPT | Performed by: FAMILY MEDICINE

## 2020-04-27 RX ORDER — METHYLPREDNISOLONE 4 MG/1
TABLET ORAL
Qty: 21 TABLET | Refills: 0 | Status: SHIPPED | OUTPATIENT
Start: 2020-04-27 | End: 2020-05-03

## 2020-06-10 ENCOUNTER — OFFICE VISIT (OUTPATIENT)
Dept: PODIATRY | Facility: CLINIC | Age: 80
End: 2020-06-10
Payer: MEDICARE

## 2020-06-10 VITALS
BODY MASS INDEX: 41.08 KG/M2 | DIASTOLIC BLOOD PRESSURE: 66 MMHG | WEIGHT: 310 LBS | HEIGHT: 73 IN | HEART RATE: 70 BPM | RESPIRATION RATE: 17 BRPM | SYSTOLIC BLOOD PRESSURE: 134 MMHG

## 2020-06-10 DIAGNOSIS — I70.209 PERIPHERAL ARTERIOSCLEROSIS (HCC): ICD-10-CM

## 2020-06-10 DIAGNOSIS — M77.42 METATARSALGIA OF BOTH FEET: Primary | ICD-10-CM

## 2020-06-10 DIAGNOSIS — B35.1 ONYCHOMYCOSIS: ICD-10-CM

## 2020-06-10 DIAGNOSIS — M77.41 METATARSALGIA OF BOTH FEET: Primary | ICD-10-CM

## 2020-06-10 DIAGNOSIS — M54.16 RADICULOPATHY OF LUMBAR REGION: ICD-10-CM

## 2020-06-10 PROCEDURE — 99213 OFFICE O/P EST LOW 20 MIN: CPT | Performed by: PODIATRIST

## 2020-06-29 DIAGNOSIS — J41.8 MIXED SIMPLE AND MUCOPURULENT CHRONIC BRONCHITIS (HCC): ICD-10-CM

## 2020-07-01 RX ORDER — IPRATROPIUM BROMIDE AND ALBUTEROL SULFATE 2.5; .5 MG/3ML; MG/3ML
SOLUTION RESPIRATORY (INHALATION)
Qty: 90 VIAL | Refills: 11 | Status: SHIPPED | OUTPATIENT
Start: 2020-07-01 | End: 2021-05-03

## 2020-07-07 ENCOUNTER — APPOINTMENT (OUTPATIENT)
Dept: LAB | Facility: HOSPITAL | Age: 80
End: 2020-07-07
Payer: MEDICARE

## 2020-07-07 ENCOUNTER — TRANSCRIBE ORDERS (OUTPATIENT)
Dept: ADMINISTRATIVE | Facility: HOSPITAL | Age: 80
End: 2020-07-07

## 2020-07-07 DIAGNOSIS — R80.1 PERSISTENT PROTEINURIA: ICD-10-CM

## 2020-07-07 DIAGNOSIS — N18.30 CHRONIC KIDNEY DISEASE, STAGE III (MODERATE) (HCC): ICD-10-CM

## 2020-07-07 DIAGNOSIS — N18.30 CHRONIC KIDNEY DISEASE, STAGE III (MODERATE) (HCC): Primary | ICD-10-CM

## 2020-07-07 DIAGNOSIS — Z79.899 ENCOUNTER FOR LONG-TERM (CURRENT) USE OF OTHER MEDICATIONS: ICD-10-CM

## 2020-07-07 DIAGNOSIS — Z79.899 ENCOUNTER FOR LONG-TERM (CURRENT) USE OF OTHER MEDICATIONS: Primary | ICD-10-CM

## 2020-07-07 LAB
25(OH)D3 SERPL-MCNC: 25.5 NG/ML (ref 30–100)
ANION GAP SERPL CALCULATED.3IONS-SCNC: 5 MMOL/L (ref 4–13)
BACTERIA UR QL AUTO: ABNORMAL /HPF
BILIRUB UR QL STRIP: NEGATIVE
BUN SERPL-MCNC: 34 MG/DL (ref 5–25)
CALCIUM SERPL-MCNC: 9 MG/DL (ref 8.3–10.1)
CHLORIDE SERPL-SCNC: 104 MMOL/L (ref 100–108)
CLARITY UR: CLEAR
CO2 SERPL-SCNC: 31 MMOL/L (ref 21–32)
COLOR UR: YELLOW
CREAT SERPL-MCNC: 1.62 MG/DL (ref 0.6–1.3)
CREAT UR-MCNC: 77.8 MG/DL
ERYTHROCYTE [DISTWIDTH] IN BLOOD BY AUTOMATED COUNT: 13.5 % (ref 11.6–15.1)
GFR SERPL CREATININE-BSD FRML MDRD: 39 ML/MIN/1.73SQ M
GLUCOSE P FAST SERPL-MCNC: 157 MG/DL (ref 65–99)
GLUCOSE UR STRIP-MCNC: NEGATIVE MG/DL
HCT VFR BLD AUTO: 40.6 % (ref 36.5–49.3)
HGB BLD-MCNC: 13 G/DL (ref 12–17)
HGB UR QL STRIP.AUTO: ABNORMAL
KETONES UR STRIP-MCNC: NEGATIVE MG/DL
LEUKOCYTE ESTERASE UR QL STRIP: ABNORMAL
MAGNESIUM SERPL-MCNC: 2.3 MG/DL (ref 1.6–2.6)
MCH RBC QN AUTO: 30.3 PG (ref 26.8–34.3)
MCHC RBC AUTO-ENTMCNC: 32 G/DL (ref 31.4–37.4)
MCV RBC AUTO: 95 FL (ref 82–98)
NITRITE UR QL STRIP: NEGATIVE
NON-SQ EPI CELLS URNS QL MICRO: ABNORMAL /HPF
NT-PROBNP SERPL-MCNC: 1032 PG/ML
PH UR STRIP.AUTO: 6 [PH]
PHOSPHATE SERPL-MCNC: 3.1 MG/DL (ref 2.3–4.1)
PLATELET # BLD AUTO: 176 THOUSANDS/UL (ref 149–390)
PMV BLD AUTO: 9.8 FL (ref 8.9–12.7)
POTASSIUM SERPL-SCNC: 4 MMOL/L (ref 3.5–5.3)
PROT UR STRIP-MCNC: NEGATIVE MG/DL
PROT UR-MCNC: 12 MG/DL
PROT/CREAT UR: 0.15 MG/G{CREAT} (ref 0–0.1)
PTH-INTACT SERPL-MCNC: 96.6 PG/ML (ref 18.4–80.1)
RBC # BLD AUTO: 4.29 MILLION/UL (ref 3.88–5.62)
RBC #/AREA URNS AUTO: ABNORMAL /HPF
SODIUM SERPL-SCNC: 140 MMOL/L (ref 136–145)
SP GR UR STRIP.AUTO: 1.01 (ref 1–1.03)
URATE SERPL-MCNC: 10.2 MG/DL (ref 4.2–8)
UROBILINOGEN UR QL STRIP.AUTO: 0.2 E.U./DL
WBC # BLD AUTO: 7.98 THOUSAND/UL (ref 4.31–10.16)
WBC #/AREA URNS AUTO: ABNORMAL /HPF

## 2020-07-07 PROCEDURE — 84100 ASSAY OF PHOSPHORUS: CPT

## 2020-07-07 PROCEDURE — 81001 URINALYSIS AUTO W/SCOPE: CPT | Performed by: INTERNAL MEDICINE

## 2020-07-07 PROCEDURE — 80048 BASIC METABOLIC PNL TOTAL CA: CPT

## 2020-07-07 PROCEDURE — 84550 ASSAY OF BLOOD/URIC ACID: CPT

## 2020-07-07 PROCEDURE — 83880 ASSAY OF NATRIURETIC PEPTIDE: CPT

## 2020-07-07 PROCEDURE — 85027 COMPLETE CBC AUTOMATED: CPT

## 2020-07-07 PROCEDURE — 83970 ASSAY OF PARATHORMONE: CPT

## 2020-07-07 PROCEDURE — 84156 ASSAY OF PROTEIN URINE: CPT | Performed by: INTERNAL MEDICINE

## 2020-07-07 PROCEDURE — 83735 ASSAY OF MAGNESIUM: CPT

## 2020-07-07 PROCEDURE — 36415 COLL VENOUS BLD VENIPUNCTURE: CPT

## 2020-07-07 PROCEDURE — 82306 VITAMIN D 25 HYDROXY: CPT

## 2020-07-07 PROCEDURE — 82570 ASSAY OF URINE CREATININE: CPT | Performed by: INTERNAL MEDICINE

## 2020-07-09 ENCOUNTER — TELEPHONE (OUTPATIENT)
Dept: NEPHROLOGY | Facility: CLINIC | Age: 80
End: 2020-07-09

## 2020-07-09 NOTE — TELEPHONE ENCOUNTER
Talked with pt  Denies UA symptoms  (rescheduled appt and now has virtual video visit with Dr Corona Reyna 7/10/20)    ----- Message from [place], [place] sent at 7/9/2020 11:04 AM EDT -----  Please make sure patient is not having any uti symptoms  Thank you

## 2020-07-10 ENCOUNTER — TELEMEDICINE (OUTPATIENT)
Dept: NEPHROLOGY | Facility: CLINIC | Age: 80
End: 2020-07-10
Payer: MEDICARE

## 2020-07-10 DIAGNOSIS — I48.20 CHRONIC ATRIAL FIBRILLATION (HCC): ICD-10-CM

## 2020-07-10 DIAGNOSIS — R80.1 PERSISTENT PROTEINURIA: ICD-10-CM

## 2020-07-10 DIAGNOSIS — N18.30 BENIGN HYPERTENSION WITH CHRONIC KIDNEY DISEASE, STAGE III (HCC): Primary | ICD-10-CM

## 2020-07-10 DIAGNOSIS — E55.9 VITAMIN D INSUFFICIENCY: ICD-10-CM

## 2020-07-10 DIAGNOSIS — I12.9 BENIGN HYPERTENSION WITH CHRONIC KIDNEY DISEASE, STAGE III (HCC): Primary | ICD-10-CM

## 2020-07-10 DIAGNOSIS — R60.0 BILATERAL LEG EDEMA: ICD-10-CM

## 2020-07-10 DIAGNOSIS — N18.30 CKD (CHRONIC KIDNEY DISEASE) STAGE 3, GFR 30-59 ML/MIN (HCC): ICD-10-CM

## 2020-07-10 DIAGNOSIS — E79.0 HYPERURICEMIA: ICD-10-CM

## 2020-07-10 DIAGNOSIS — N25.81 SECONDARY HYPERPARATHYROIDISM OF RENAL ORIGIN (HCC): ICD-10-CM

## 2020-07-10 PROCEDURE — 4040F PNEUMOC VAC/ADMIN/RCVD: CPT | Performed by: INTERNAL MEDICINE

## 2020-07-10 PROCEDURE — 99214 OFFICE O/P EST MOD 30 MIN: CPT | Performed by: INTERNAL MEDICINE

## 2020-07-10 PROCEDURE — 1036F TOBACCO NON-USER: CPT | Performed by: INTERNAL MEDICINE

## 2020-07-10 PROCEDURE — 3078F DIAST BP <80 MM HG: CPT | Performed by: INTERNAL MEDICINE

## 2020-07-10 PROCEDURE — 1160F RVW MEDS BY RX/DR IN RCRD: CPT | Performed by: INTERNAL MEDICINE

## 2020-07-10 PROCEDURE — 3075F SYST BP GE 130 - 139MM HG: CPT | Performed by: INTERNAL MEDICINE

## 2020-07-10 RX ORDER — ALLOPURINOL 300 MG/1
300 TABLET ORAL DAILY
Qty: 90 TABLET | Refills: 3 | Status: SHIPPED | OUTPATIENT
Start: 2020-07-10 | End: 2021-03-02

## 2020-07-10 RX ORDER — FUROSEMIDE 40 MG/1
TABLET ORAL
Qty: 135 TABLET | Refills: 3 | Status: SHIPPED | OUTPATIENT
Start: 2020-07-10 | End: 2020-12-18

## 2020-07-10 NOTE — PROGRESS NOTES
Virtual Regular Visit    Assessment/Plan:  CKD stage III, baseline serum creatinine 1 3-1 5  -creatinine 1 6 slightly elevated although still closer to baseline   -decrease Lasix as below  - urinalysis in  July 2020 shows 30 to 50 WBCs, no hematuria or proteinuria  Patient denies any urinary complaint  - CKD likely secondary to long-term hypertension causing hypertensive nephrosclerosis, morbid obesity   - Avoid nephrotoxins or NSAIDs     -will do repeat UPC ratio, BMP before next visit     Proteinuria, last UPC ratio 150 mg overall stable  - repeat UPC ratio as mentioned above  -Could be secondary to underlying hypertension +/- obesity related secondary FSGS component   - May Consider ACE inhibitor eventually if renal function remains stable although with significant weight loss and stable proteinuria, continue to monitor for now        History of gross hematuria  -Denies any recent episodes of gross hematuria   Follow up with Urology     Hypertension  -he has not check BP lately at home  - Salt restricted diet   - continue felodipine   Lasix as below     Leg edema,   -he continued to intentionally lose weight and has lost another 25 pound since last visit  His weight seems to be around 292 pounds lately at home    -leg edema seems to be improving  -given significant ongoing weight loss, will reduce Lasix from 40 mg b i d  To 40 mg in a m /20 mg in p m   - his exertion dyspnea has overall improved    -continue to monitor daily weight and call back if he has weight gain greater than 5 lb in 2 to 3 days  -echocardiogram in April 2019 shows EF 50%, mild to moderate concentric hypertrophy, mild-to-moderate aortic stenosis      Secondary hyperparathyroidism, last PTH 96 in the setting of vitamin-D insufficiency  Continue to monitor     Vitamin-D insufficiency , last level 25 5 in July 2020   Recommended to start vitamin-D 2000 units daily       Hyperuricemia with history of gout  -serum uric acid  worsened to 10 2 in July 2020  Will increase allopurinol from 200 mg to 300 mg daily    He claims to be compliant with allopurinol        Morbid obesity, he has been losing weight as mentioned above     CAD, status post PTCA in April 2019 and again in May 2019      Problem List Items Addressed This Visit        Cardiovascular and Mediastinum    Benign hypertension with chronic kidney disease, stage III ([facility]) - Primary    Relevant Medications    furosemide (LASIX) 40 mg tablet       Genitourinary    CKD (chronic kidney disease) stage 3, GFR 30-59 ml/min ([facility])    Relevant Medications    furosemide (LASIX) 40 mg tablet    Other Relevant Orders    Basic metabolic panel    CBC    Magnesium    Phosphorus    PTH, intact    Vitamin D 25 hydroxy       Other    Proteinuria    Relevant Orders    Protein / creatinine ratio, urine    Hyperuricemia    Relevant Medications    allopurinol (ZYLOPRIM) 300 mg tablet    Other Relevant Orders    Uric acid    Bilateral leg edema      Other Visit Diagnoses     Chronic atrial fibrillation ([facility])        Relevant Medications    furosemide (LASIX) 40 mg tablet    Secondary hyperparathyroidism of renal origin ([facility] )        Relevant Orders    Phosphorus    PTH, intact    Vitamin D 25 hydroxy    Vitamin D insufficiency        Relevant Orders    Vitamin D 25 hydroxy        Reason for visit is   Chief Complaint   Patient presents with    Virtual Regular Visit    Chronic Kidney Disease        Encounter provider Lucretia Turner MD    Provider located at [address] [place] [id number]      Recent Visits  Date Type Provider Dept   07/09/20 Telephone Redd Garner [facility]   Showing recent visits within past 7 days and meeting all other requirements     Today's Visits  Date Type Provider Dept   07/10/20 Telemedicine Lucretia Turner MD [facility] [address] today's visits and meeting all other requirements     Future Appointments  No visits were found meeting these conditions  Showing future appointments within next 150 days and meeting all other requirements        The patient was identified by name and date of birth  Dafnefranchesca Gonzales was informed that this is a telemedicine visit and that the visit is being conducted through [facility] and patient was informed that this is a secure, HIPAA-compliant platform  He agrees to proceed     My office door was closed  No one else was in the room  He acknowledged consent and understanding of privacy and security of the video platform  The patient has agreed to participate and understands they can discontinue the visit at any time  Patient is aware this is a billable service  Subjective  Patient is [de-identified] male with significant medical issues of hypertension for many years, CAD, status post PTCA, status post pacemaker placement, morbidly obese, CKD stage III with baseline creatinine 1 3-1 5, gout, AFib, JOVI on BiPAP regular follow-up of CKD  Since last visit, he continued to lose significant weight and has lost 25 lb  His weight at home seems to be around 292 lb  Most recent serum creatinine slightly increased to 1 6 although still closer to baseline  currently remains on Lasix 40 mg p o  B i d  Dara Soulier    He denies any urinary complaint at this time   His leg edema is overall stable  Patient is unable to do exercise due to back pain/knee pain issues  No recent NSAID exposure      Past Medical History:   Diagnosis Date    Arthritis     [facility] flutter [facility]     Chronic kidney disease     stage 3    Coronary artery disease     2 stents    Fluid retention     Gout     [facility]     pacemaker    Hypertension     Pacemaker     [facility]     Radiculopathy     last assessed 1/28/16     Shortness of breath     exertion    Sleep apnea     c pap       Past Surgical History:   Procedure Laterality Date    ANGIOPLASTY      x2 2 stents and then replaced  CARDIAC PACEMAKER PLACEMENT      pacemaker permanent placement dual chamber / last assessed 4/7/14 / implantation     CARDIAC SURGERY      pacemaker    CHOLECYSTECTOMY      CORONARY ANGIOPLASTY WITH STENT PLACEMENT      EPIDURAL BLOCK INJECTION N/A 5/26/2016    Procedure: BLOCK / INJECTION EPIDURAL STEROID LUMBAR  L4-5;  Surgeon: Yola Aguirre MD;  Location[facility] MAIN OR;  Service:     EPIDURAL BLOCK INJECTION N/A 2/14/2019    Procedure: L4 L5 Lumbar Epidural Steroid Injection;  Surgeon: Yola Aguirre MD;  Location: [facility] MAIN OR;  Service: Pain Management     EYE SURGERY      cataract left    KNEE ARTHROSCOPY W/ MENISCAL REPAIR Left     LUMBAR EPIDURAL INJECTION N/A 3/17/2016    Procedure: BLOCK / INJECTION LUMBAR  L4-5  (C-ARM); Surgeon: Yola Aguirre MD;  Location: [facility] MAIN OR;  Service:        Current Outpatient Medications   Medication Sig Dispense Refill    ascorbic acid (VITAMIN C) 500 mg tablet Take 500 mg by mouth daily   B Complex Vitamins (B COMPLEX 1 PO) Take 1 tablet by mouth daily   Biotin (BIOTIN 5000) 5 MG CAPS Take 1,000 mcg by mouth daily   calcium carbonate-vitamin D (OSCAL-D) 500 mg-200 units per tablet Take 2 tablets by mouth daily at bedtime   clopidogrel (PLAVIX) 75 mg tablet Take 1 tablet (75 mg total) by mouth daily 90 tablet 3    Cranberry 1000 MG CAPS Take 1 tablet by mouth 2 (two) times a day   cyanocobalamin 1000 MCG tablet Take 100 mcg by mouth daily      felodipine (PLENDIL) 5 mg 24 hr tablet Take 1 tablet (5 mg total) by mouth daily 90 tablet 3    finasteride (PROSCAR) 5 mg tablet Take 1 tablet (5 mg total) by mouth daily 90 tablet 3    Flaxseed, Linseed, (EQL FLAX SEED OIL) 1000 MG CAPS Take by mouth daily   furosemide (LASIX) 40 mg tablet TAKE ONE TABLET(40 MG) IN AM AND HALF TABLET (20 MG) IN  tablet 3    glucosamine-chondroitin 500-400 MG tablet Take 1 tablet by mouth 2 (two) times a day        Omega-3 Fatty Acids (FISH OIL) 1,000 mg Take 1,000 mg by mouth 2 (two) times a day   PARoxetine (PAXIL) 20 mg tablet TAKE 1/2 TABLET BY MOUTH DAILY 45 tablet 4    rivaroxaban (XARELTO) 15 mg tablet Take 1 tablet (15 mg total) by mouth daily with breakfast (Patient taking differently: Take 20 mg by mouth daily with breakfast )      simvastatin (ZOCOR) 20 mg tablet Take 1 tablet (20 mg total) by mouth daily at bedtime 90 tablet 3    traMADol (ULTRAM) 50 mg tablet Take 50 mg by mouth every 6 (six) hours as needed for moderate pain      albuterol (VENTOLIN HFA) 90 mcg/act inhaler Inhale 2 puffs every 6 (six) hours as needed for wheezing 1 Inhaler 6    allopurinol (ZYLOPRIM) 300 mg tablet Take 1 tablet (300 mg total) by mouth daily 90 tablet 3    colchicine (COLCRYS) 0 6 mg tablet Take 1 tablet (0 6 mg total) by mouth 2 (two) times a day Take twice daily x 7 days as needed for gout 30 tablet 5    dextromethorphan-guaiFENesin (ROBITUSSIN DM)  mg/5 mL syrup Take 10 mL by mouth every 4 (four) hours as needed for cough 118 mL 0    fluocinonide (LIDEX) 0 05 % cream Apply topically 2 (two) times a day 120 g 2    fluticasone-vilanterol (BREO ELLIPTA) 100-25 mcg/inh inhaler Inhale 1 puff daily Rinse mouth after use  3 Inhaler 3    ipratropium-albuterol (DUO-NEB) 0 5-2 5 mg/3 mL nebulizer solution USE 1 VIAL IN NEBULIZER 3 TIMES DAILY 90 vial 11    lactase (LACTAID) 3,000 units tablet Take 3,000 Units by mouth as needed   psyllium (METAMUCIL) 58 6 % powder Take 1 packet by mouth daily as needed        No current facility-administered medications for this visit  No Known Allergies    Review of system:  More than 10 review of system were obtained and no other pertinent positive findings other than mentioned in the note  Video Exam    There were no vitals filed for this visit  Physical Exam   Constitutional: He is oriented to person, place, and time  He appears well-developed and well-nourished     HENT:   Head: Normocephalic and atraumatic  Right Ear: External ear normal    Left Ear: External ear normal    Eyes: EOM are normal    Neck:   No obvious swelling noted   Cardiovascular:   Trace edema in both legs   Pulmonary/Chest: Effort normal    No conversational dyspnea noted, breathing pattern seems to be normal   Abdominal: He exhibits no distension  Musculoskeletal: He exhibits edema (Trace edema in both legs)  Neurological: He is alert and oriented to person, place, and time  Skin: No rash noted  Psychiatric: He has a normal mood and affect  His behavior is normal         I spent 30 minutes directly with the patient during this visit      Toney Brown acknowledges that he has consented to an online visit or consultation  He understands that the online visit is based solely on information provided by him, and that, in the absence of a face-to-face physical evaluation by the physician, the diagnosis he receives is both limited and provisional in terms of accuracy and completeness  This is not intended to replace a full medical face-to-face evaluation by the physician  Mariana Peraza understands and accepts these terms

## 2020-08-26 ENCOUNTER — OFFICE VISIT (OUTPATIENT)
Dept: PODIATRY | Facility: CLINIC | Age: 80
End: 2020-08-26
Payer: MEDICARE

## 2020-08-26 VITALS
WEIGHT: 310 LBS | RESPIRATION RATE: 17 BRPM | HEART RATE: 70 BPM | DIASTOLIC BLOOD PRESSURE: 66 MMHG | HEIGHT: 73 IN | BODY MASS INDEX: 41.08 KG/M2 | SYSTOLIC BLOOD PRESSURE: 134 MMHG

## 2020-08-26 DIAGNOSIS — I70.209 ATHEROSCLEROSIS OF ARTERIES OF EXTREMITIES (HCC): ICD-10-CM

## 2020-08-26 DIAGNOSIS — M77.42 METATARSALGIA OF BOTH FEET: Primary | ICD-10-CM

## 2020-08-26 DIAGNOSIS — M54.16 RADICULOPATHY OF LUMBAR REGION: ICD-10-CM

## 2020-08-26 DIAGNOSIS — M21.969 ACQUIRED DEFORMITY OF ANKLE AND FOOT, UNSPECIFIED LATERALITY: ICD-10-CM

## 2020-08-26 DIAGNOSIS — I70.209 PERIPHERAL ARTERIOSCLEROSIS (HCC): ICD-10-CM

## 2020-08-26 DIAGNOSIS — M77.41 METATARSALGIA OF BOTH FEET: Primary | ICD-10-CM

## 2020-08-26 DIAGNOSIS — B35.1 ONYCHOMYCOSIS: ICD-10-CM

## 2020-08-26 PROCEDURE — 99213 OFFICE O/P EST LOW 20 MIN: CPT | Performed by: PODIATRIST

## 2020-08-26 NOTE — PROGRESS NOTES
Assessment/Plan:  Pain upon ambulation   Limb pain   Rule out radiculopathy   Edema secondary to venous insufficiency   Rule out degenerative disc disease   Probable spinal stenosis      Plan   Patient advised on condition  All nails debrided  Calluses debrided without pain or complication  Patient will follow up with pain management and acupuncture specialists          Subjective:  Patient complains of pain in his feet with ambulation  He has pain in his back and legs  He is doing well with acupuncture  He complains of pain with shoe wear    No history of trauma                 Past Medical History:   Diagnosis Date    Arthritis      Atrial flutter (HCC)      Chronic kidney disease       stage 3    Coronary artery disease       2 stents    Fluid retention      Gout      Heart failure (HCC)       pacemaker    Hypertension      Pacemaker      Pulmonary emphysema (HCC)      Radiculopathy       last assessed 1/28/16     Shortness of breath       exertion    Sleep apnea       c pap                   Past Surgical History:   Procedure Laterality Date    ANGIOPLASTY         x2 2 stents and then replaced    CARDIAC PACEMAKER PLACEMENT         pacemaker permanent placement dual chamber / last assessed 4/7/14 / implantation     CARDIAC SURGERY         pacemaker    CHOLECYSTECTOMY        CORONARY ANGIOPLASTY WITH STENT PLACEMENT        EPIDURAL BLOCK INJECTION N/A 5/26/2016     Procedure: BLOCK / INJECTION EPIDURAL STEROID LUMBAR  L4-5;  Surgeon: Carlton Reeves MD;  Location: [place] [address] MAIN OR;  Service:     EYE SURGERY         cataract left    KNEE ARTHROSCOPY W/ MENISCAL REPAIR Left      LUMBAR EPIDURAL INJECTION N/A 3/17/2016     Procedure: BLOCK / INJECTION LUMBAR  L4-5  (C-[facility]);  Surgeon: Carlton Reeves MD;  Location: [place] [address] MAIN OR;  Service:          No Known Allergies        Current Outpatient Prescriptions:     albuterol (VENTOLIN HFA) 90 mcg/act inhaler, Inhale 2 puffs every 6 (six) hours as needed for wheezing, Disp: 1 Inhaler, Rfl: 6    allopurinol (ZYLOPRIM) 100 mg tablet, Take 1 tablet (100 mg total) by mouth daily, Disp: 90 tablet, Rfl: 3    ascorbic acid (VITAMIN C) 500 mg tablet, Take 500 mg by mouth daily  , Disp: , Rfl:     B Complex Vitamins (B COMPLEX 1 PO), Take 1 tablet by mouth daily  , Disp: , Rfl:     Biotin (BIOTIN 5000) 5 MG CAPS, Take 1,000 mcg by mouth daily  , Disp: , Rfl:     calcium carbonate-vitamin D (OSCAL-D) 500 mg-200 units per tablet, Take 2 tablets by mouth daily at bedtime  , Disp: , Rfl:     ciclopirox (LOPROX) 0 77 % cream, Apply topically 2 (two) times a day for 20 days, Disp: 45 g, Rfl: 2    clopidogrel (PLAVIX) 75 mg tablet, TAKE 1 TABLET DAILY, Disp: 90 tablet, Rfl: 3    collagenase (SANTYL) ointment, Apply 1 application topically daily  , Disp: , Rfl:     Cranberry 1000 MG CAPS, Take 1 tablet by mouth 2 (two) times a day , Disp: , Rfl:     cyanocobalamin 1000 MCG tablet, Take 100 mcg by mouth daily, Disp: , Rfl:     felodipine (PLENDIL) 5 mg 24 hr tablet, TAKE 1 TABLET DAILY, Disp: 90 tablet, Rfl: 3    finasteride (PROSCAR) 5 mg tablet, Take 1 tablet (5 mg total) by mouth daily, Disp: 90 tablet, Rfl: 3    Flaxseed, Linseed, (EQL FLAX SEED OIL) 1000 MG CAPS, Take by mouth daily  , Disp: , Rfl:     fluocinonide (LIDEX) 0 05 % cream, Apply topically 2 (two) times a day for 30 days, Disp: 30 g, Rfl: 2    fluticasone-salmeterol (ADVAIR) 250-50 mcg/dose inhaler, Inhale 1 puff 2 (two) times a day Rinse mouth after use , Disp: 1 Inhaler, Rfl: 6    furosemide (LASIX) 40 mg tablet, TAKE 1 TABLET BY MOUTH DAILY AS NEEDED FOR LEG EDEMA OR DYSNEA, Disp: 30 tablet, Rfl: 0    gabapentin (NEURONTIN) 300 mg capsule, TAKE 1 CAPSULE (300 MG TOTAL) BY MOUTH 2 (TWO) TIMES A DAY FOR 30 DAYS, Disp: 60 capsule, Rfl: 2    gabapentin (NEURONTIN) 300 mg capsule, Take 1 capsule (300 mg total) by mouth 3 (three) times a day for 30 days, Disp: 90 capsule, Rfl: 0    gabapentin (NEURONTIN) 600 MG tablet, Take 1 tablet (600 mg total) by mouth 3 (three) times a day for 30 days, Disp: 60 tablet, Rfl: 0    glucosamine-chondroitin 500-400 MG tablet, Take 1 tablet by mouth 2 (two) times a day , Disp: , Rfl:     hydrOXYzine HCL (ATARAX) 25 mg tablet, Take 1 tablet (25 mg total) by mouth 3 (three) times a day for 3 days, Disp: 9 tablet, Rfl: 0    lactase (LACTAID) 3,000 units tablet, Take 3,000 Units by mouth as needed  , Disp: , Rfl:     methylPREDNISolone 4 MG tablet therapy pack, Use as directed on package, Disp: 21 tablet, Rfl: 0    Omega-3 Fatty Acids (FISH OIL) 1,000 mg, Take 1,000 mg by mouth 2 (two) times a day , Disp: , Rfl:     oxyCODONE-acetaminophen (PERCOCET) 5-325 mg per tablet, 1 tablet to be taken 3 times daily as needed for leg pain, Disp: 30 tablet, Rfl: 0    PARoxetine (PAXIL) 20 mg tablet, TAKE 1/2 TABLET BY MOUTH DAILY, Disp: 30 tablet, Rfl: 0    psyllium (METAMUCIL) 58 6 % powder, Take 1 packet by mouth daily Indications: 1 tsp , Disp: , Rfl:     rivaroxaban (XARELTO) tablet, Take 20 mg by mouth , Disp: , Rfl:     simvastatin (ZOCOR) 20 mg tablet, Take 1 tablet (20 mg total) by mouth daily at bedtime, Disp: 90 tablet, Rfl: 3    traMADol (ULTRAM) 50 mg tablet, 1 tablet to be taken twice daily as needed for foot and leg pain, Disp: 30 tablet, Rfl: 0           Patient Active Problem List   Diagnosis    Chronic pain disorder    Bilateral lumbar radiculopathy    Lumbar canal stenosis    Acquired ankle/foot deformity    Pain in joints of both feet    Dermatophytosis    Atherosclerosis of arteries of extremities (HCC)    Pain in both feet    Onychomycosis    Neck muscle spasm    Anxiety 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    Benign hypertension with CKD (chronic kidney disease) stage III (HCC)    Bilateral leg edema    CKD (chronic kidney disease), stage III (HCC)    Microscopic hematuria    Proteinuria    Urinary frequency    Atrial fibrillation ([facility] [address] )    Benign prostatic hyperplasia    Congestive heart failure (HCC)    Arteriosclerosis of coronary artery    Allergic rhinitis    Aneurysm of abdominal aorta (HCC)    Angina pectoris (HCC)    Pain in joint involving ankle and foot    Atrial flutter (HCC)    Carpal tunnel syndrome    Chronic gout without tophus    Chronic low back pain    Chronic obstructive pulmonary disease (HCC)    Colon, diverticulosis    Deep venous insufficiency    Degenerative disc disease, lumbar    Difficulty in walking    Fecal soiling    Fibromyalgia    Fistula-in-ano    Hyperlipidemia    Insomnia, persistent    Memory loss    Moderate or severe vision impairment, one eye    Morbid obesity (HCC)    Nicotine dependence    Osteoarthritis of knee    Pacemaker    Sleep apnea    Urinary incontinence    Venous insufficiency (chronic) (peripheral)    BMI 45 0-49 9, adult (HCC)    Carbuncle of neck    Carbuncle of occipital region of scalp    Elevated glucose    Peripheral arteriosclerosis (HCC)    Radiculopathy of lumbar region    Metatarsalgia of both feet          Physical Exam  Left Foot: Appearance: Normal except as noted: excessive pronation-- and-- pes planus  Second toe deformities include hammer toe  Third toe deformities include hammer toe  Forth toe deformities include hammer toe  Fifth toe deformities include hammer toe  Tenderness: None except the great toe,-- distal first metatarsal,-- distal fifth metatarsal-- and-- insertion of the plantar fascia  Positive Brian sign third left intermetatarsal space  ROM: Full  Motor: Normal   Right Foot: Appearance: Normal except as noted: excessive pronation-- and-- pes planus  Second toe deformities include hammer toe  Third toe deformities include hammer toe  Forth toe deformities include hammer toe  Fifth toe deformities include hammer toe  Evaluation of the great toe nail demonstrates an ingrown nail   Tenderness: None except the insertion of the plantar fascia  Left Ankle: Appearance: Normal except erythema,-- swelling-- and-- swelling laterally  Tenderness: None except the tibiotalar joint  Right Ankle: ROM: Full  Neurological Exam: performed  Deep tendon reflexes: + tinel of right tibial nerve  Vascular Exam: performed Dorsalis pedis pulses were present bilaterally  Posterior tibial pulses were present bilaterally  Elevation Pallor: absent bilaterally  Capillary refill time was less than 1 second bilaterally  Edema: moderate bilaterally-- and-- 4/7 pitting  neg  buddy  Toenails: All of the toenails were elongated,-- hypertrophied,-- discolored,-- ingrown,-- shown to have subungual debris,-- tender-- and-- Mycotic with onychauxis  Hyperkeratosis: present on both first toes,-- present on both first sub metatarsals-- and-- present on both fifth sub metatarsals  Shoe Gear Evaluation: performed ()   Recommendation(s): extra depth diabetic shoes

## 2020-08-27 ENCOUNTER — TELEPHONE (OUTPATIENT)
Dept: FAMILY MEDICINE CLINIC | Facility: CLINIC | Age: 80
End: 2020-08-27

## 2020-08-27 ENCOUNTER — TELEPHONE (OUTPATIENT)
Dept: PULMONOLOGY | Facility: MEDICAL CENTER | Age: 80
End: 2020-08-27

## 2020-08-27 DIAGNOSIS — J18.9 PNEUMONIA OF RIGHT LOWER LOBE DUE TO INFECTIOUS ORGANISM: Primary | ICD-10-CM

## 2020-08-27 RX ORDER — AZITHROMYCIN 250 MG/1
TABLET, FILM COATED ORAL
Qty: 6 TABLET | Refills: 0 | Status: SHIPPED | OUTPATIENT
Start: 2020-08-27 | End: 2020-08-31

## 2020-08-27 NOTE — TELEPHONE ENCOUNTER
Patients wife called Garrick France, she said patient has COPD and is experiencing SOB with exertion   She said he has a wet cough  No fever, no chest pain  She checked his O2 sat and It was 96  She said he's not experiencing any discomfort at the moment  I offered him a virtual appt, as per Dr PENA,  but the wife said she will contact his pulmonologist for an assessment   Forwarding to Dr Deepika Marquez as Alberto Julain MA

## 2020-08-27 NOTE — TELEPHONE ENCOUNTER
Pt SOB at home w/ 02 sat 90% and coughing up green and yellow mucus  Discussed Starting Sable Ode and obtaining PA/LAT CXR    He will proceed for CXR at [facility] now

## 2020-08-28 ENCOUNTER — APPOINTMENT (OUTPATIENT)
Dept: RADIOLOGY | Facility: CLINIC | Age: 80
End: 2020-08-28
Payer: MEDICARE

## 2020-08-28 DIAGNOSIS — J18.9 PNEUMONIA OF RIGHT LOWER LOBE DUE TO INFECTIOUS ORGANISM: ICD-10-CM

## 2020-08-28 PROCEDURE — 71046 X-RAY EXAM CHEST 2 VIEWS: CPT

## 2020-09-11 ENCOUNTER — APPOINTMENT (EMERGENCY)
Dept: RADIOLOGY | Facility: HOSPITAL | Age: 80
DRG: 291 | End: 2020-09-11
Attending: EMERGENCY MEDICINE
Payer: MEDICARE

## 2020-09-11 ENCOUNTER — HOSPITAL ENCOUNTER (INPATIENT)
Facility: HOSPITAL | Age: 80
LOS: 4 days | Discharge: HOME | DRG: 291 | End: 2020-09-15
Attending: EMERGENCY MEDICINE | Admitting: FAMILY MEDICINE
Payer: MEDICARE

## 2020-09-11 DIAGNOSIS — R06.02 SOB (SHORTNESS OF BREATH): ICD-10-CM

## 2020-09-11 DIAGNOSIS — J18.9 PNEUMONIA: Primary | ICD-10-CM

## 2020-09-11 DIAGNOSIS — R78.81 GRAM-NEGATIVE BACTEREMIA: ICD-10-CM

## 2020-09-11 DIAGNOSIS — I50.33 ACUTE ON CHRONIC DIASTOLIC CONGESTIVE HEART FAILURE (HCC): ICD-10-CM

## 2020-09-11 DIAGNOSIS — R09.02 HYPOXIA: ICD-10-CM

## 2020-09-11 LAB
ALBUMIN SERPL BCP-MCNC: 2.9 G/DL (ref 3.5–5)
ALP SERPL-CCNC: 64 U/L (ref 46–116)
ALT SERPL W P-5'-P-CCNC: 37 U/L (ref 12–78)
ANION GAP SERPL CALCULATED.3IONS-SCNC: 8 MMOL/L (ref 4–13)
AST SERPL W P-5'-P-CCNC: 48 U/L (ref 5–45)
BASOPHILS # BLD AUTO: 0.1 THOUSANDS/ΜL (ref 0–0.1)
BASOPHILS NFR BLD AUTO: 1 % (ref 0–1)
BILIRUB SERPL-MCNC: 0.6 MG/DL (ref 0.2–1)
BUN SERPL-MCNC: 31 MG/DL (ref 5–25)
CALCIUM SERPL-MCNC: 9.2 MG/DL (ref 8.3–10.1)
CHLORIDE SERPL-SCNC: 102 MMOL/L (ref 100–108)
CO2 SERPL-SCNC: 30 MMOL/L (ref 21–32)
CREAT SERPL-MCNC: 2 MG/DL (ref 0.6–1.3)
EOSINOPHIL # BLD AUTO: 0.58 THOUSAND/ΜL (ref 0–0.61)
EOSINOPHIL NFR BLD AUTO: 7 % (ref 0–6)
ERYTHROCYTE [DISTWIDTH] IN BLOOD BY AUTOMATED COUNT: 15.1 % (ref 11.6–15.1)
GFR SERPL CREATININE-BSD FRML MDRD: 31 ML/MIN/1.73SQ M
GLUCOSE SERPL-MCNC: 131 MG/DL (ref 65–140)
HCT VFR BLD AUTO: 38 % (ref 36.5–49.3)
HGB BLD-MCNC: 12.1 G/DL (ref 12–17)
IMM GRANULOCYTES # BLD AUTO: 0.08 THOUSAND/UL (ref 0–0.2)
IMM GRANULOCYTES NFR BLD AUTO: 1 % (ref 0–2)
LYMPHOCYTES # BLD AUTO: 1.6 THOUSANDS/ΜL (ref 0.6–4.47)
LYMPHOCYTES NFR BLD AUTO: 19 % (ref 14–44)
MCH RBC QN AUTO: 30.1 PG (ref 26.8–34.3)
MCHC RBC AUTO-ENTMCNC: 31.8 G/DL (ref 31.4–37.4)
MCV RBC AUTO: 95 FL (ref 82–98)
MONOCYTES # BLD AUTO: 0.54 THOUSAND/ΜL (ref 0.17–1.22)
MONOCYTES NFR BLD AUTO: 6 % (ref 4–12)
NEUTROPHILS # BLD AUTO: 5.68 THOUSANDS/ΜL (ref 1.85–7.62)
NEUTS SEG NFR BLD AUTO: 66 % (ref 43–75)
NRBC BLD AUTO-RTO: 0 /100 WBCS
NT-PROBNP SERPL-MCNC: 1892 PG/ML
PLATELET # BLD AUTO: 177 THOUSANDS/UL (ref 149–390)
PMV BLD AUTO: 9.6 FL (ref 8.9–12.7)
POTASSIUM SERPL-SCNC: 5 MMOL/L (ref 3.5–5.3)
PROT SERPL-MCNC: 7.7 G/DL (ref 6.4–8.2)
RBC # BLD AUTO: 4.02 MILLION/UL (ref 3.88–5.62)
SODIUM SERPL-SCNC: 140 MMOL/L (ref 136–145)
TROPONIN I SERPL-MCNC: 0.04 NG/ML
WBC # BLD AUTO: 8.58 THOUSAND/UL (ref 4.31–10.16)

## 2020-09-11 PROCEDURE — 85025 COMPLETE CBC W/AUTO DIFF WBC: CPT | Performed by: EMERGENCY MEDICINE

## 2020-09-11 PROCEDURE — 87077 CULTURE AEROBIC IDENTIFY: CPT | Performed by: EMERGENCY MEDICINE

## 2020-09-11 PROCEDURE — 84484 ASSAY OF TROPONIN QUANT: CPT | Performed by: EMERGENCY MEDICINE

## 2020-09-11 PROCEDURE — 99285 EMERGENCY DEPT VISIT HI MDM: CPT

## 2020-09-11 PROCEDURE — 1123F ACP DISCUSS/DSCN MKR DOCD: CPT | Performed by: EMERGENCY MEDICINE

## 2020-09-11 PROCEDURE — 36415 COLL VENOUS BLD VENIPUNCTURE: CPT | Performed by: EMERGENCY MEDICINE

## 2020-09-11 PROCEDURE — 94640 AIRWAY INHALATION TREATMENT: CPT

## 2020-09-11 PROCEDURE — 83880 ASSAY OF NATRIURETIC PEPTIDE: CPT | Performed by: EMERGENCY MEDICINE

## 2020-09-11 PROCEDURE — 93005 ELECTROCARDIOGRAM TRACING: CPT

## 2020-09-11 PROCEDURE — 87040 BLOOD CULTURE FOR BACTERIA: CPT | Performed by: EMERGENCY MEDICINE

## 2020-09-11 PROCEDURE — 99285 EMERGENCY DEPT VISIT HI MDM: CPT | Performed by: EMERGENCY MEDICINE

## 2020-09-11 PROCEDURE — 80053 COMPREHEN METABOLIC PANEL: CPT | Performed by: EMERGENCY MEDICINE

## 2020-09-11 PROCEDURE — 87186 SC STD MICRODIL/AGAR DIL: CPT | Performed by: EMERGENCY MEDICINE

## 2020-09-11 PROCEDURE — 71045 X-RAY EXAM CHEST 1 VIEW: CPT

## 2020-09-11 RX ORDER — IPRATROPIUM BROMIDE AND ALBUTEROL SULFATE 2.5; .5 MG/3ML; MG/3ML
3 SOLUTION RESPIRATORY (INHALATION) ONCE
Status: COMPLETED | OUTPATIENT
Start: 2020-09-11 | End: 2020-09-11

## 2020-09-11 RX ORDER — PREDNISONE 20 MG/1
60 TABLET ORAL ONCE
Status: COMPLETED | OUTPATIENT
Start: 2020-09-11 | End: 2020-09-11

## 2020-09-11 RX ORDER — CEFTRIAXONE 1 G/50ML
1000 INJECTION, SOLUTION INTRAVENOUS ONCE
Status: COMPLETED | OUTPATIENT
Start: 2020-09-11 | End: 2020-09-12

## 2020-09-11 RX ADMIN — CEFTRIAXONE 1000 MG: 1 INJECTION, SOLUTION INTRAVENOUS at 23:51

## 2020-09-11 RX ADMIN — PREDNISONE 60 MG: 20 TABLET ORAL at 22:31

## 2020-09-11 RX ADMIN — SODIUM CHLORIDE 500 ML: 0.9 INJECTION, SOLUTION INTRAVENOUS at 23:52

## 2020-09-11 RX ADMIN — IPRATROPIUM BROMIDE AND ALBUTEROL SULFATE 3 ML: 2.5; .5 SOLUTION RESPIRATORY (INHALATION) at 22:31

## 2020-09-12 ENCOUNTER — APPOINTMENT (INPATIENT)
Dept: RADIOLOGY | Facility: HOSPITAL | Age: 80
DRG: 291 | End: 2020-09-12
Payer: MEDICARE

## 2020-09-12 LAB
ANION GAP SERPL CALCULATED.3IONS-SCNC: 10 MMOL/L (ref 4–13)
BASOPHILS # BLD AUTO: 0.09 THOUSANDS/ΜL (ref 0–0.1)
BASOPHILS NFR BLD AUTO: 1 % (ref 0–1)
BUN SERPL-MCNC: 33 MG/DL (ref 5–25)
CALCIUM SERPL-MCNC: 8.8 MG/DL (ref 8.3–10.1)
CHLORIDE SERPL-SCNC: 104 MMOL/L (ref 100–108)
CO2 SERPL-SCNC: 26 MMOL/L (ref 21–32)
CREAT SERPL-MCNC: 1.76 MG/DL (ref 0.6–1.3)
EOSINOPHIL # BLD AUTO: 0.21 THOUSAND/ΜL (ref 0–0.61)
EOSINOPHIL NFR BLD AUTO: 2 % (ref 0–6)
ERYTHROCYTE [DISTWIDTH] IN BLOOD BY AUTOMATED COUNT: 15 % (ref 11.6–15.1)
GFR SERPL CREATININE-BSD FRML MDRD: 36 ML/MIN/1.73SQ M
GLUCOSE SERPL-MCNC: 149 MG/DL (ref 65–140)
HCT VFR BLD AUTO: 36.9 % (ref 36.5–49.3)
HGB BLD-MCNC: 11.9 G/DL (ref 12–17)
IMM GRANULOCYTES # BLD AUTO: 0.05 THOUSAND/UL (ref 0–0.2)
IMM GRANULOCYTES NFR BLD AUTO: 1 % (ref 0–2)
L PNEUMO1 AG UR QL IA.RAPID: NEGATIVE
LYMPHOCYTES # BLD AUTO: 0.96 THOUSANDS/ΜL (ref 0.6–4.47)
LYMPHOCYTES NFR BLD AUTO: 10 % (ref 14–44)
MCH RBC QN AUTO: 30.4 PG (ref 26.8–34.3)
MCHC RBC AUTO-ENTMCNC: 32.2 G/DL (ref 31.4–37.4)
MCV RBC AUTO: 94 FL (ref 82–98)
MONOCYTES # BLD AUTO: 0.4 THOUSAND/ΜL (ref 0.17–1.22)
MONOCYTES NFR BLD AUTO: 4 % (ref 4–12)
NEUTROPHILS # BLD AUTO: 7.78 THOUSANDS/ΜL (ref 1.85–7.62)
NEUTS SEG NFR BLD AUTO: 82 % (ref 43–75)
NRBC BLD AUTO-RTO: 0 /100 WBCS
PLATELET # BLD AUTO: 153 THOUSANDS/UL (ref 149–390)
PMV BLD AUTO: 9.7 FL (ref 8.9–12.7)
POTASSIUM SERPL-SCNC: 4 MMOL/L (ref 3.5–5.3)
PROCALCITONIN SERPL-MCNC: 0.21 NG/ML
RBC # BLD AUTO: 3.92 MILLION/UL (ref 3.88–5.62)
S PNEUM AG UR QL: NEGATIVE
SARS-COV-2 RNA RESP QL NAA+PROBE: NEGATIVE
SODIUM SERPL-SCNC: 140 MMOL/L (ref 136–145)
TROPONIN I SERPL-MCNC: 0.02 NG/ML
TROPONIN I SERPL-MCNC: 0.02 NG/ML
WBC # BLD AUTO: 9.49 THOUSAND/UL (ref 4.31–10.16)

## 2020-09-12 PROCEDURE — 84484 ASSAY OF TROPONIN QUANT: CPT | Performed by: NURSE PRACTITIONER

## 2020-09-12 PROCEDURE — 94003 VENT MGMT INPAT SUBQ DAY: CPT

## 2020-09-12 PROCEDURE — 87081 CULTURE SCREEN ONLY: CPT | Performed by: FAMILY MEDICINE

## 2020-09-12 PROCEDURE — 84145 PROCALCITONIN (PCT): CPT | Performed by: NURSE PRACTITIONER

## 2020-09-12 PROCEDURE — 94760 N-INVAS EAR/PLS OXIMETRY 1: CPT

## 2020-09-12 PROCEDURE — 94002 VENT MGMT INPAT INIT DAY: CPT

## 2020-09-12 PROCEDURE — 87449 NOS EACH ORGANISM AG IA: CPT | Performed by: NURSE PRACTITIONER

## 2020-09-12 PROCEDURE — 94150 VITAL CAPACITY TEST: CPT

## 2020-09-12 PROCEDURE — 71250 CT THORAX DX C-: CPT

## 2020-09-12 PROCEDURE — 94664 DEMO&/EVAL PT USE INHALER: CPT

## 2020-09-12 PROCEDURE — 99223 1ST HOSP IP/OBS HIGH 75: CPT | Performed by: INTERNAL MEDICINE

## 2020-09-12 PROCEDURE — 85025 COMPLETE CBC W/AUTO DIFF WBC: CPT | Performed by: NURSE PRACTITIONER

## 2020-09-12 PROCEDURE — 94640 AIRWAY INHALATION TREATMENT: CPT

## 2020-09-12 PROCEDURE — G1004 CDSM NDSC: HCPCS

## 2020-09-12 PROCEDURE — 87635 SARS-COV-2 COVID-19 AMP PRB: CPT | Performed by: INTERNAL MEDICINE

## 2020-09-12 PROCEDURE — 80048 BASIC METABOLIC PNL TOTAL CA: CPT | Performed by: NURSE PRACTITIONER

## 2020-09-12 PROCEDURE — 94762 N-INVAS EAR/PLS OXIMTRY CONT: CPT

## 2020-09-12 RX ORDER — LANOLIN ALCOHOL/MO/W.PET/CERES
1 CREAM (GRAM) TOPICAL 2 TIMES DAILY
Status: DISCONTINUED | OUTPATIENT
Start: 2020-09-12 | End: 2020-09-12

## 2020-09-12 RX ORDER — ALLOPURINOL 300 MG/1
300 TABLET ORAL DAILY
Status: DISCONTINUED | OUTPATIENT
Start: 2020-09-12 | End: 2020-09-15 | Stop reason: HOSPADM

## 2020-09-12 RX ORDER — CLOPIDOGREL BISULFATE 75 MG/1
75 TABLET ORAL DAILY
Status: DISCONTINUED | OUTPATIENT
Start: 2020-09-12 | End: 2020-09-15 | Stop reason: HOSPADM

## 2020-09-12 RX ORDER — CHLORAL HYDRATE 500 MG
1000 CAPSULE ORAL 2 TIMES DAILY
Status: DISCONTINUED | OUTPATIENT
Start: 2020-09-12 | End: 2020-09-15 | Stop reason: HOSPADM

## 2020-09-12 RX ORDER — ONDANSETRON 2 MG/ML
4 INJECTION INTRAMUSCULAR; INTRAVENOUS EVERY 6 HOURS PRN
Status: DISCONTINUED | OUTPATIENT
Start: 2020-09-12 | End: 2020-09-15 | Stop reason: HOSPADM

## 2020-09-12 RX ORDER — FUROSEMIDE 10 MG/ML
40 INJECTION INTRAMUSCULAR; INTRAVENOUS DAILY
Status: DISCONTINUED | OUTPATIENT
Start: 2020-09-12 | End: 2020-09-14

## 2020-09-12 RX ORDER — PAROXETINE HYDROCHLORIDE 20 MG/1
10 TABLET, FILM COATED ORAL DAILY
Status: DISCONTINUED | OUTPATIENT
Start: 2020-09-12 | End: 2020-09-15 | Stop reason: HOSPADM

## 2020-09-12 RX ORDER — FINASTERIDE 5 MG/1
5 TABLET, FILM COATED ORAL DAILY
Status: DISCONTINUED | OUTPATIENT
Start: 2020-09-12 | End: 2020-09-15 | Stop reason: HOSPADM

## 2020-09-12 RX ORDER — PRAVASTATIN SODIUM 40 MG
40 TABLET ORAL
Status: DISCONTINUED | OUTPATIENT
Start: 2020-09-12 | End: 2020-09-15 | Stop reason: HOSPADM

## 2020-09-12 RX ORDER — FLUTICASONE FUROATE AND VILANTEROL 100; 25 UG/1; UG/1
1 POWDER RESPIRATORY (INHALATION) DAILY
Status: DISCONTINUED | OUTPATIENT
Start: 2020-09-12 | End: 2020-09-15 | Stop reason: HOSPADM

## 2020-09-12 RX ORDER — CEFTRIAXONE 1 G/50ML
1000 INJECTION, SOLUTION INTRAVENOUS EVERY 24 HOURS
Status: DISCONTINUED | OUTPATIENT
Start: 2020-09-12 | End: 2020-09-14

## 2020-09-12 RX ORDER — FUROSEMIDE 10 MG/ML
40 INJECTION INTRAMUSCULAR; INTRAVENOUS ONCE
Status: COMPLETED | OUTPATIENT
Start: 2020-09-12 | End: 2020-09-12

## 2020-09-12 RX ORDER — ASCORBIC ACID 500 MG
500 TABLET ORAL DAILY
Status: DISCONTINUED | OUTPATIENT
Start: 2020-09-12 | End: 2020-09-15 | Stop reason: HOSPADM

## 2020-09-12 RX ORDER — B-COMPLEX WITH VITAMIN C
2 TABLET ORAL
Status: DISCONTINUED | OUTPATIENT
Start: 2020-09-12 | End: 2020-09-15 | Stop reason: HOSPADM

## 2020-09-12 RX ORDER — IPRATROPIUM BROMIDE AND ALBUTEROL SULFATE 2.5; .5 MG/3ML; MG/3ML
3 SOLUTION RESPIRATORY (INHALATION)
Status: DISCONTINUED | OUTPATIENT
Start: 2020-09-12 | End: 2020-09-15 | Stop reason: HOSPADM

## 2020-09-12 RX ORDER — FELODIPINE 2.5 MG/1
5 TABLET, EXTENDED RELEASE ORAL DAILY
Status: DISCONTINUED | OUTPATIENT
Start: 2020-09-12 | End: 2020-09-15 | Stop reason: HOSPADM

## 2020-09-12 RX ADMIN — PRAVASTATIN SODIUM 40 MG: 40 TABLET ORAL at 17:05

## 2020-09-12 RX ADMIN — IPRATROPIUM BROMIDE AND ALBUTEROL SULFATE 3 ML: 2.5; .5 SOLUTION RESPIRATORY (INHALATION) at 14:36

## 2020-09-12 RX ADMIN — AZITHROMYCIN MONOHYDRATE 500 MG: 500 INJECTION, POWDER, LYOPHILIZED, FOR SOLUTION INTRAVENOUS at 00:55

## 2020-09-12 RX ADMIN — RIVAROXABAN 20 MG: 10 TABLET, FILM COATED ORAL at 17:05

## 2020-09-12 RX ADMIN — IPRATROPIUM BROMIDE AND ALBUTEROL SULFATE 3 ML: 2.5; .5 SOLUTION RESPIRATORY (INHALATION) at 19:21

## 2020-09-12 RX ADMIN — CEFTRIAXONE 1000 MG: 1 INJECTION, SOLUTION INTRAVENOUS at 21:14

## 2020-09-12 RX ADMIN — PAROXETINE HYDROCHLORIDE 10 MG: 20 TABLET, FILM COATED ORAL at 09:37

## 2020-09-12 RX ADMIN — ALLOPURINOL 300 MG: 300 TABLET ORAL at 09:38

## 2020-09-12 RX ADMIN — FUROSEMIDE 40 MG: 10 INJECTION, SOLUTION INTRAMUSCULAR; INTRAVENOUS at 03:31

## 2020-09-12 RX ADMIN — CLOPIDOGREL BISULFATE 75 MG: 75 TABLET ORAL at 09:38

## 2020-09-12 RX ADMIN — Medication 1000 MG: at 09:35

## 2020-09-12 RX ADMIN — FUROSEMIDE 40 MG: 10 INJECTION, SOLUTION INTRAMUSCULAR; INTRAVENOUS at 09:40

## 2020-09-12 RX ADMIN — FELODIPINE 5 MG: 2.5 TABLET, FILM COATED, EXTENDED RELEASE ORAL at 09:32

## 2020-09-12 RX ADMIN — VITAM B12 100 MCG: 100 TAB at 09:32

## 2020-09-12 RX ADMIN — IPRATROPIUM BROMIDE AND ALBUTEROL SULFATE 3 ML: 2.5; .5 SOLUTION RESPIRATORY (INHALATION) at 07:49

## 2020-09-12 RX ADMIN — OXYCODONE HYDROCHLORIDE AND ACETAMINOPHEN 500 MG: 500 TABLET ORAL at 09:38

## 2020-09-12 RX ADMIN — FINASTERIDE 5 MG: 5 TABLET, FILM COATED ORAL at 09:38

## 2020-09-12 RX ADMIN — Medication 1000 MG: at 17:05

## 2020-09-12 RX ADMIN — OYSTER SHELL CALCIUM WITH VITAMIN D 2 TABLET: 500; 200 TABLET, FILM COATED ORAL at 07:55

## 2020-09-12 RX ADMIN — IPRATROPIUM BROMIDE AND ALBUTEROL SULFATE 3 ML: 2.5; .5 SOLUTION RESPIRATORY (INHALATION) at 03:38

## 2020-09-12 RX ADMIN — FLUTICASONE FUROATE AND VILANTEROL TRIFENATATE 1 PUFF: 100; 25 POWDER RESPIRATORY (INHALATION) at 09:43

## 2020-09-12 NOTE — ED NOTES
Pt aaox4,resting quietly,continues on cardiac monitor,pt speaking in full sentences  Trialed pt off o2,pt's o2 dropped to 89% room air  Replaced pt onto 2L nasal cannula       Bigg Hernandez RN  09/11/20 Shaunna Lozano, RN  09/11/20 [id number]

## 2020-09-12 NOTE — PROGRESS NOTES
[facility] Internal Medicine Progress Note  Patient: Justino Ayala [de-identified] y o  male   MRN: [id number]  PCP: Dayanna Reynolds DO  Unit/Bed#: ICU 05 Encounter: [id number]  Date Of Visit: 09/12/20    Problem List:    Principal Problem:    Acute respiratory failure with hypoxia ([address] )  Active Problems:    Acute on chronic diastolic congestive heart failure (HCC)    COPD (chronic obstructive pulmonary disease) (HCC)    CKD (chronic kidney disease) stage 3, GFR 30-59 ml/min (HCC)    CAD (coronary artery disease)    Pneumonia    Chronic a-fib (HCC)    Chronic gout without tophus    JOVI (obstructive sleep apnea)      Assessment & Plan:    Acute on chronic diastolic congestive heart failure (HCC)  Assessment & Plan  Wt Readings from Last 3 Encounters:   09/12/20 136 kg (299 lb 6 2 oz)   08/26/20 (!) 141 kg (310 lb)   06/10/20 (!) 141 kg (310 lb)     Patient reports that he gained 4 lbs after going out to dinner followed by breakfast the next day  He had increased edema  He was short of breath with exertion evening  Clinical presentation consistent with CHF  Chest x-ray-air space disease on the right middle and lower lobe  CT scan diffuse infiltrate right more than left  Lymphadenopathy noted  Prior echocardiogram EF 55%,  Improving after IV Lasix  · telemetry  · Serial cardiac marker  · Continue IV Lasix 40 mg daily  · Daily weight, intake output  · Salt and fluid restriction, discussed with patient  · Cardiology evaluation    * Acute respiratory failure with hypoxia (HCC)  Assessment & Plan  Oxygen saturation in the low 80s at home, high 80s on arrival to the ER  Likely secondary to CHF  Improving after IV Lasix  · Continue supplemental oxygen, taper of as tolerated    Pneumonia  Assessment & Plan  Patient was exerting himself this evening and developed shortness of breath  He denies any fevers, cough or chills  Denies sick on  Patient was treated with azithromycin last week for URI, negative CXR at that time  With clinical improvement  Pneumonia suspect on presentation based on radiographic unilateral finding  Patient is afebrile with normal white count  Doubt bacterial pneumonia  · Monitor clinically  · Check SARS-CoV-2 PCR  · Continue rocephin and azithromycin for now low threshold to discontinue if workup is negative  · Send urine for strep and legionella  · Blood cultures pending  · Send procalcitonin and trend daily  · Supportive therapies with duonebs, respiratory protocol  · Repeat imaging/CT scan as outpatient    CAD (coronary artery disease)  Assessment & Plan  History of PCI to the LADx2, RCA and circumflex,   Serial troponins-negative  Continue plavix, metoprolol, statin    CKD (chronic kidney disease) stage 3, GFR 30-59 ml/min ([facility])  Assessment & Plan  Baseline Cr 1 2-1 5, elevated at 2 0  Likely cardiorenal  Creatinine better 1 76 today after diuresis  Monitor intake output  Follow-up BMP    COPD (chronic obstructive pulmonary disease) ([facility])  Assessment & Plan  No evidence of exacerbation  Continue duonebs and breo    JVOI (obstructive sleep apnea)  Assessment & Plan  Continue CPAP    Chronic gout without tophus  Assessment & Plan  continue allopurinol     Chronic a-fib ([facility])  Assessment & Plan  History of complete heart block post pacemaker  Continue metoprolol and xarelto        VTE Pharmacologic Prophylaxis:   Pharmacologic: Rivaroxaban (Xarelto)  Mechanical VTE Prophylaxis in Place: Yes    Patient Centered Rounds: I have performed bedside rounds with nursing staff today  Discussions with Specialists or Other Care Team Provider:  Yes    Education and Discussions with Family / Patient:  Daughter at bedside    Time Spent for Care: 45 minutes  More than 50% of total time spent on counseling and coordination of care as described above      Current Length of Stay: 1 day(s)    Current Patient Status: Inpatient   Certification Statement: The patient will continue to require additional inpatient hospital stay due to Respiratory failure, decompensated CHF    Discharge Plan:  Home in next 24-48 hours    Code Status: Level 1 - Full Code      Subjective:   HPI reviewed with patient  Patient reported that he was in usual state of health until yesterday when he developed worsening shortness of breath after exertion  Patient reported significant calorie consult intake day prior with subsequent 4 lb weight gain  This was associated with chest tightness  He also noticed increased pedal edema  Patient is compliant with his medication but symptoms did not improve despite taking his diuretics  Patient also reported that 2 weeks prior he had developed increasing cough with expectoration at that time he was prescribed azithromycin and subsequently his symptoms were resolved  Patient denied any fever chills or sick contacts  In ED, patient was afebrile with normal blood pressures noted to be hypoxic on room air with associated tachypnea  Workup revealed normal white count  Creatinine was elevated from baseline  Patient was noted to have elevated proBNP  Chest x-ray revealed increase hazy airspace disease throughout the right middle and lower lung field  Patient received IV antibiotics and IV Lasix and subsequently admitted  Patient reports that she feels much better  Urinated significant amount after getting medication in emergency room  Anxious to go home  Objective:     Vitals:   Temp (24hrs), Av 4 °F (36 9 °C), Min:98 °F (36 7 °C), Max:98 8 °F (37 1 °C)    Temp:  [98 °F (36 7 °C)-98 8 °F (37 1 °C)] 98 °F (36 7 °C)  HR:  [68-74] 74  Resp:  [20-32] 20  BP: (117-164)/(55-70) 134/69  SpO2:  [84 %-100 %] 96 % room air  Body mass index is 39 5 kg/m²  Input and Output Summary (last 24 hours):        Intake/Output Summary (Last 24 hours) at 2020 1217  Last data filed at 2020 0500  Gross per 24 hour   Intake    Output 1225 ml   Net -1225 ml       Physical Exam:     Physical Exam  Constitutional: General: He is not in acute distress  Appearance: He is obese  HENT:      Head: Normocephalic and atraumatic  Eyes:      Conjunctiva/sclera: Conjunctivae normal       Pupils: Pupils are equal, round, and reactive to light  Neck:      Musculoskeletal: Normal range of motion and neck supple  Cardiovascular:      Rate and Rhythm: Normal rate and regular rhythm  Heart sounds: Murmur present  Pulmonary:      Effort: Pulmonary effort is normal  No respiratory distress  Breath sounds: Rales (At bases) present  No wheezing or rhonchi  Chest:      Chest wall: No tenderness  Abdominal:      General: Bowel sounds are normal  There is no distension  Palpations: Abdomen is soft  Tenderness: There is no abdominal tenderness  There is no guarding or rebound  Musculoskeletal:      Right lower leg: Edema present  Left lower leg: Edema present  Skin:     General: Skin is warm and dry  Findings: No rash  Neurological:      General: No focal deficit present  Mental Status: He is alert and oriented to person, place, and time  Mental status is at baseline  Cranial Nerves: No cranial nerve deficit  Additional Data:     Labs:    Results from last 7 days   Lab Units 09/12/20  0605   WBC Thousand/uL 9 49   HEMOGLOBIN g/dL 11 9*   HEMATOCRIT % 36 9   PLATELETS Thousands/uL 153   NEUTROS PCT % 82*   LYMPHS PCT % 10*   MONOS PCT % 4   EOS PCT % 2     Results from last 7 days   Lab Units 09/12/20  0605 09/11/20  2256   POTASSIUM mmol/L 4 0 5 0   CHLORIDE mmol/L 104 102   CO2 mmol/L 26 30   BUN mg/dL 33* 31*   CREATININE mg/dL 1 76* 2 00*   CALCIUM mg/dL 8 8 9 2   ALK PHOS U/L  --  64   ALT U/L  --  37   AST U/L  --  48*           * I Have Reviewed All Lab Data Listed Above  * Additional Pertinent Lab Tests Reviewed: All Labs Within Last 24 Hours Reviewed      Imaging:  Imaging Reports Reviewed Today Include:  Chest x-ray and CT scan    Recent Cultures (last 7 days): Last 24 Hours Medication List:   Current Facility-Administered Medications   Medication Dose Route Frequency Provider Last Rate    allopurinol  300 mg Oral Daily Atilio Core, CRNP      ascorbic acid  500 mg Oral Daily Atilio Core, CRNP      cefTRIAXone  1,000 mg Intravenous Q24H Atilio Core, CRNP      And    azithromycin  500 mg Intravenous Q24H Atilio Core, CRNP      calcium carbonate-vitamin D  2 tablet Oral Daily With Breakfast Atilio Core, CRNP      clopidogrel  75 mg Oral Daily Atilio Core, CRNP      cyanocobalamin  100 mcg Oral Daily Atilio Core, CRNP      felodipine  5 mg [facility], [facility]      finasteride  5 mg Oral Daily [facility], CRNP      fish oil  1,000 mg Oral BID Atilio Core, CRNP      fluticasone-vilanterol  1 puff Inhalation Daily Atilio Core, CRNP      furosemide  40 mg Intravenous Daily Atilio Core, CRNP      ipratropium-albuterol  3 mL Nebulization Q6H Atilio Core, CRNP      ondansetron  4 mg Intravenous Q6H PRN Atilio Core, CRNP      PARoxetine  10 mg Oral Daily Atilio Core, CRNP      pravastatin  40 mg Oral Daily With Dinner Atilio Core, CRNP      rivaroxaban  20 mg Oral Daily With [address], CRNP            Today, Patient Was Seen By: Jose Antonio Arvizu MD    ** Please Note: "This note has been constructed using a voice recognition system  Therefore there may be syntax, spelling, and/or grammatical errors   Please call if you have any questions  "**

## 2020-09-12 NOTE — H&P
H&P- Natali Nelson 1940, [de-identified] y o  male MRN: [id number]    Unit/Bed#: ICU 05 Encounter: [id number]    Primary Care Provider: Jarrell Tomas DO   Date and time admitted to hospital: 9/11/2020  9:36 PM    * Pneumonia  Assessment & Plan  Patient was exerting himself this evening and developed shortness of breath  He denies any fevers, cough or chills  He was treated with azithromycin last week for URI, negative CXR at that time  No leukocytosis  CXR revealed increased hazy airspace disease throughout the right middle and lower lung fields consistent with pneumonia or asymmetric pulmonary edema  Will obtain CT of the chest to further clarify CHF vs PNA  · Admit to medicine  · Continue rocephin and azithromycin  · Send urine for strep and legionella  · Blood cultures pending  · Send procalcitonin and trend daily  · Supportive therapies with duonebs, respiratory protocol    Acute respiratory failure with hypoxia (HCC)  Assessment & Plan  Oxygen saturation in the low 80s at home, high 80s on arrival to the ER  Likely secondary to PNA and history of COPD  · Continue supplemental oxygen    CAD (coronary artery disease)  Assessment & Plan  History of PCI to the LADx2, RCA and circumflex,   Serial troponins  Continue plavix, metoprolol, statin    CKD (chronic kidney disease) stage 3, GFR 30-59 ml/min ([facility])  Assessment & Plan  Baseline Cr 1 2-1 5, elevated at 2 0  Suspect a degree of volume overload - cardiorenal syndrome?   Lasix 40mg IVP x 1    JOVI (obstructive sleep apnea)  Assessment & Plan  Continue CPAP    COPD (chronic obstructive pulmonary disease) ([facility])  Assessment & Plan  No evidence of exacerbation  Continue duonebs and breo    Chronic gout without tophus  Assessment & Plan  continue allopurinol     Chronic a-fib ([facility])  Assessment & Plan  History of complete heart block post pacemaker  Continue metoprolol and xarelto    Diastolic congestive heart failure ([facility])  Assessment & Plan  Wt Readings from Last 3 Encounters:   09/12/20 136 kg (299 lb 6 2 oz)   08/26/20 (!) 141 kg (310 lb)   06/10/20 (!) 141 kg (310 lb)   Patient reports that he gained 4 lbs after going out to dinner followed by breakfast the next day  He had increased edema  He was short of breath with exertion this evening  He has worsening creatinine which he has had in the past with volume overload  BNP elevated at 1900  · Admit to telemetry  · Lasix 40 mg IVP x 1  · Echo from 4/19 revealed an EF of 55%  · Daily Weight  · Intake and output  · Low sodium diet  · Serial troponins    VTE Prophylaxis: Rivaroxaban (Xarelto)  / sequential compression device   Code Status: Level 1 - Full Code    Anticipated Length of Stay:  Patient will be admitted on an Inpatient basis with an anticipated length of stay of greater than 2 midnights  Justification for Hospital Stay: CHF exacerbation, PNA    Total Time for Visit, including Counseling / Coordination of Care: 20 minutes  Greater than 50% of this total time spent on direct patient counseling and coordination of care  Chief Complaint:   Shortness of Breath (pt c/o sob x 1 hour forgot to take HS neb treatment, no home o2 use  pt sts similiar episode 1-2 weeks ago took coarse of abx clear cxr  no home o2 use  )      History of Present Illness:    Nahed Jj is a [de-identified] y o  male with a PMH of COPD, hypertension, atrial flutter post ppm for complete heart block, obstructive sleep apnea, chronic kidney disease, coronary artery disease post PCI x4 who presents with complaints of shortness of breath  Patient was exerting himself this evening when he developed shortness of breath  He states that it was worse than usual   He checked his pulse ox which is in the low 80s prompting him to come to the emergency department  On arrival to the ER patient remained hypoxic with an oxygen saturation of 86% on room air  He responded to supplemental oxygen    Labs in the ER were unremarkable aside from a mild bump in his creatinine to 2  Chest x-ray was concerning for pneumonia versus vascular congestion  CT of the chest was ordered and is pending  Patient does endorse that he went out to dinner and had calorie and amount of breakfast and had eggs and fried potatoes  He subsequently gained 4 lb  He did take an additional dose of his diuretic with minimal diuresis  He endorses increased pedal edema  He denies any fevers, chills, cough  He does report completing a course of azithromycin over a week ago as he had chest congestion  Chest x-ray at that that time was negative  Patient is admitted for further management of the above  Review of Systems:    Review of Systems   Constitutional: Positive for unexpected weight change  Negative for chills and fever  HENT: Negative  Eyes: Negative  Respiratory: Positive for shortness of breath  Negative for cough and wheezing  Cardiovascular: Positive for leg swelling  Gastrointestinal: Negative  Endocrine: Negative  Genitourinary: Negative  Musculoskeletal: Negative  Skin: Negative  Allergic/Immunologic: Negative  Neurological: Negative  Hematological: Negative  Psychiatric/Behavioral: Negative          Past Medical and Surgical History:     Past Medical History:   Diagnosis Date    Arthritis     Atrial flutter ([facility]     Chronic kidney disease     stage 3    Coronary artery disease     2 stents    Fluid retention     Gout     Heart failure ([facility]     pacemaker    Hypertension     Pacemaker     Pulmonary emphysema ([facility]     Radiculopathy     last assessed 1/28/16     Shortness of breath     exertion    Sleep apnea     c pap       Past Surgical History:   Procedure Laterality Date    ANGIOPLASTY      x2 2 stents and then replaced   [address]      pacemaker permanent placement dual chamber / last assessed 4/7/14 / implantation     CARDIAC SURGERY      pacemaker    CHOLECYSTECTOMY      CORONARY ANGIOPLASTY WITH STENT PLACEMENT      EPIDURAL BLOCK INJECTION N/A 5/26/2016    Procedure: BLOCK / INJECTION EPIDURAL STEROID LUMBAR  L4-5;  Surgeon: Harleen Camarillo MD;  Location: [facility] MAIN OR;  Service:     EPIDURAL BLOCK INJECTION N/A 2/14/2019    Procedure: L4 L5 Lumbar Epidural Steroid Injection;  Surgeon: Harleen Camarillo MD;  Location[facility] MA[facility];  Service: Pain Management     EYE SURGERY      cataract left    KNEE ARTHROSCOPY W/ MENISCAL REPAIR Left     LUMBAR EPIDURAL INJECTION N/A 3/17/2016    Procedure: BLOCK / INJECTION LUMBAR  L4-5  (C-ARM); Surgeon: Harleen Camarillo MD;  Location: [facility] MAIN OR;  Service:        Meds/Allergies:    Prior to Admission medications    Medication Sig Start Date End Date Taking? Authorizing Provider   albuterol (VENTOLIN HFA) 90 mcg/act inhaler Inhale 2 puffs every 6 (six) hours as needed for wheezing 7/22/19  Yes Jason Melendez MD   allopurinol (ZYLOPRIM) 300 mg tablet Take 1 tablet (300 mg total) by mouth daily 7/10/20  Yes Jeanie Rutledge MD   ascorbic acid (VITAMIN C) 500 mg tablet Take 500 mg by mouth daily  Yes Historical Provider, MD   B Complex Vitamins (B COMPLEX 1 PO) Take 1 tablet by mouth daily  Yes Historical Provider, MD   Biotin (BIOTIN 5000) 5 MG CAPS Take 1,000 mcg by mouth daily  Yes Historical Provider, MD   calcium carbonate-vitamin D (OSCAL-D) 500 mg-200 units per tablet Take 2 tablets by mouth daily at bedtime  Yes Historical Provider, MD   clopidogrel (PLAVIX) 75 mg tablet Take 1 tablet (75 mg total) by mouth daily 3/19/20  Yes Jason Melendez MD   Cranberry 1000 MG CAPS Take 1 tablet by mouth 2 (two) times a day     Yes Historical Provider, MD   dextromethorphan-guaiFENesin (ROBITUSSIN DM)  mg/5 mL syrup Take 10 mL by mouth every 4 (four) hours as needed for cough 6/26/19  Yes Carlito Darnell MD   felodipine (PLENDIL) 5 mg 24 hr tablet Take 1 tablet (5 mg total) by mouth daily 3/19/20  Yes Jason Melendez MD   finasteride (PROSCAR) 5 mg tablet Take 1 tablet (5 mg total) by mouth daily 3/19/20  Yes Seda Loo MD   Flaxseed, Linseed, (EQL FLAX SEED OIL) 1000 MG CAPS Take by mouth daily  Yes Historical Provider, MD   fluticasone-vilanterol (BREO ELLIPTA) 100-25 mcg/inh inhaler Inhale 1 puff daily Rinse mouth after use  7/17/19  Yes NAVA Alves   furosemide (LASIX) 40 mg tablet TAKE ONE TABLET(40 MG) IN AM AND HALF TABLET (20 MG) IN PM 7/10/20  Yes Ching Auguste MD   glucosamine-chondroitin 500-400 MG tablet Take 1 tablet by mouth 2 (two) times a day  Yes Historical Provider, MD   ipratropium-albuterol (DUO-NEB) 0 5-2 5 mg/3 mL nebulizer solution USE 1 VIAL IN NEBULIZER 3 TIMES DAILY 7/1/20  Yes NAVA Alves   lactase (LACTAID) 3,000 units tablet Take 3,000 Units by mouth as needed  Yes Historical Provider, MD   Omega-3 Fatty Acids (FISH OIL) 1,000 mg Take 1,000 mg by mouth 2 (two) times a day     Yes Historical Provider, MD   PARoxetine (PAXIL) 20 mg tablet TAKE 1/2 TABLET BY MOUTH DAILY 3/16/20  Yes Seda Loo MD   psyllium (METAMUCIL) 58 6 % powder Take 1 packet by mouth daily as needed    Yes Historical Provider, MD   rivaroxaban (XARELTO) 15 mg tablet Take 1 tablet (15 mg total) by mouth daily with breakfast  Patient taking differently: Take 20 mg by mouth daily with breakfast  4/13/19  Yes Santos Meng DO   simvastatin (ZOCOR) 20 mg tablet Take 1 tablet (20 mg total) by mouth daily at bedtime 3/19/20  Yes Seda Loo MD   traMADol (ULTRAM) 50 mg tablet Take 50 mg by mouth every 6 (six) hours as needed for moderate pain   Yes Historical Provider, MD   colchicine (COLCRYS) 0 6 mg tablet Take 1 tablet (0 6 mg total) by mouth 2 (two) times a day Take twice daily x 7 days as needed for gout 7/22/19   Seda oLo MD   cyanocobalamin 1000 MCG tablet Take 100 mcg by mouth daily    Historical Provider, MD   fluocinonide (LIDEX) 0 05 % cream Apply topically 2 (two) times a day 3/19/20 6/17/20  Seda Loo MD Allergies: No Known Allergies    Social History:     Marital Status: /Civil Union   Substance Use History:   Social History     Substance and Sexual Activity   Alcohol Use Never    Frequency: Never     Social History     Tobacco Use   Smoking Status Former Smoker    Packs/day: 1 00    Years: 50 00    Pack years: 50 00    Types: Cigarettes    Last attempt to quit: 3/27/2019    Years since quittin 4   Smokeless Tobacco Never Used   Tobacco Comment    quit 2 weeks ago     Social History     Substance and Sexual Activity   Drug Use No       Family History:    Family History   Problem Relation Age of Onset    Cancer Mother 80    Heart disease Mother     Hypertension Mother     Heart disease Father     Diabetes Neg Hx     Stroke Neg Hx        Physical Exam:     Vitals:   Blood Pressure: 144/69 (20)  Pulse: 69 (20)  Temperature: 98 °F (36 7 °C) (20)  Temp Source: Oral (20)  Respirations: (!) 32 (20)  Height: 6' 1" (185 4 cm) (20)  Weight - Scale: 136 kg (299 lb 6 2 oz) (20)  SpO2: 90 % (20 0255)    Physical Exam  Vitals signs and nursing note reviewed  Constitutional:       Appearance: Normal appearance  HENT:      Head: Normocephalic  Mouth/Throat:      Mouth: Mucous membranes are moist    Eyes:      Extraocular Movements: Extraocular movements intact  Cardiovascular:      Rate and Rhythm: Normal rate and regular rhythm  Heart sounds: Murmur present  Pulmonary:      Effort: Pulmonary effort is normal       Breath sounds: Normal breath sounds  Abdominal:      General: Abdomen is flat  Bowel sounds are normal       Palpations: Abdomen is soft  Musculoskeletal: Normal range of motion  General: Swelling (2+ pedal edema) present  Skin:     General: Skin is warm and dry  Neurological:      General: No focal deficit present        Mental Status: He is alert and oriented to person, place, and time  Psychiatric:         Mood and Affect: Mood normal          Behavior: Behavior normal            Additional Data:     Lab Results: I have personally reviewed pertinent reports  Results from last 7 days   Lab Units 09/11/20  2257   WBC Thousand/uL 8 58   HEMOGLOBIN g/dL 12 1   HEMATOCRIT % 38 0   PLATELETS Thousands/uL 177   NEUTROS PCT % 66     Results from last 7 days   Lab Units 09/11/20  2256   SODIUM mmol/L 140   POTASSIUM mmol/L 5 0   CHLORIDE mmol/L 102   CO2 mmol/L 30   BUN mg/dL 31*   CREATININE mg/dL 2 00*   CALCIUM mg/dL 9 2   TOTAL BILIRUBIN mg/dL 0 60   ALK PHOS U/L 64   ALT U/L 37   AST U/L 48*         Results from last 7 days   Lab Units 09/11/20  2256   TROPONIN I ng/mL 0 04               Imaging: I have personally reviewed pertinent reports  XR chest 1 view portable   Final Result by Micki Dos Santos MD (09/11 2304)      Increased hazy airspace disease throughout the right middle and lower lung fields consistent with pneumonia or asymmetric pulmonary edema            Workstation performed: [id number]         CT chest wo contrast    (Results Pending)       XR chest 1 view portable   Final Result      Increased hazy airspace disease throughout the right middle and lower lung fields consistent with pneumonia or asymmetric pulmonary edema            Workstation performed: [id number]         CT chest wo contrast    (Results Pending)       EKG, Pathology, and Other Studies Reviewed on Admission:   · EKG: v-paced    Allscripts / Epic Records Reviewed: Yes     ** Please Note: This note has been constructed using a voice recognition system   **

## 2020-09-12 NOTE — ED NOTES
MD attempted to trial pt off oxygen spo2 decreased to 87% ra, pt placed back on oxygen BNP added to labs     Guera Patel RN  09/11/20 8022

## 2020-09-12 NOTE — ED PROVIDER NOTES
History  Chief Complaint   Patient presents with    Shortness of Breath     pt c/o sob x 1 hour forgot to take HS neb treatment, no home o2 use  pt sts similiar episode 1-2 weeks ago took coarse of abx clear cxr  no home o2 use  Patient is an 72-year-old male with a past medical history significant for COPD, CKD stage 3, hypertension, bilateral lower extremity edema, atrial fibrillation, hyperparathyroidism who presents with shortness of breath x1 hour  Patient reports that he checks his oxygen saturation at home and he typically runs 94-95%  He does not use chronic home oxygen  He typically takes a bedtime nebulizer treatment  Tonight, he was having sexual intercourse and immediately afterwards developed shortness of breath  He then went directly to sleep, but could not fall asleep secondary to the shortness of breath  He for got to use his nebulizer treatment  His wife also reports that over the last few days she feels that he has put on 4 lb and his legs are more swollen  Patient presents hypoxic with oxygen saturation 86% on room air, 95% on 2 L of oxygen via nasal cannula  Currently, patient reports that he feels back to his baseline and is ready to go home  Denies any chest pain, palpitations, lightheadedness or dizziness, nausea or, vomiting, abdominal pain  Prior to Admission Medications   Prescriptions Last Dose Informant Patient Reported? Taking? B Complex Vitamins (B COMPLEX 1 PO) 9/11/2020 at Unknown time Child Yes Yes   Sig: Take 1 tablet by mouth daily  Biotin (BIOTIN 5000) 5 MG CAPS 9/11/2020 at Unknown time Child Yes Yes   Sig: Take 1,000 mcg by mouth daily  Cranberry 1000 MG CAPS 9/11/2020 at Unknown time Child Yes Yes   Sig: Take 1 tablet by mouth 2 (two) times a day  Flaxseed, Linseed, (EQL FLAX SEED OIL) 1000 MG CAPS 9/11/2020 at Unknown time Child Yes Yes   Sig: Take by mouth daily     Omega-3 Fatty Acids (FISH OIL) 1,000 mg 9/11/2020 at Unknown time Child Yes Yes Sig: Take 1,000 mg by mouth 2 (two) times a day  PARoxetine (PAXIL) 20 mg tablet 9/11/2020 at Unknown time  No Yes   Sig: TAKE 1/2 TABLET BY MOUTH DAILY   albuterol (VENTOLIN HFA) 90 mcg/act inhaler 9/11/2020 at Unknown time Child No Yes   Sig: Inhale 2 puffs every 6 (six) hours as needed for wheezing   allopurinol (ZYLOPRIM) 300 mg tablet 9/11/2020 at Unknown time  No Yes   Sig: Take 1 tablet (300 mg total) by mouth daily   ascorbic acid (VITAMIN C) 500 mg tablet 9/11/2020 at Unknown time Child Yes Yes   Sig: Take 500 mg by mouth daily  calcium carbonate-vitamin D (OSCAL-D) 500 mg-200 units per tablet 9/11/2020 at Unknown time Child Yes Yes   Sig: Take 2 tablets by mouth daily at bedtime  clopidogrel (PLAVIX) 75 mg tablet 9/11/2020 at Unknown time  No Yes   Sig: Take 1 tablet (75 mg total) by mouth daily   colchicine (COLCRYS) 0 6 mg tablet More than a month at Unknown time Child No No   Sig: Take 1 tablet (0 6 mg total) by mouth 2 (two) times a day Take twice daily x 7 days as needed for gout   cyanocobalamin 1000 MCG tablet Unknown at Unknown time Child Yes No   Sig: Take 100 mcg by mouth daily   dextromethorphan-guaiFENesin (ROBITUSSIN DM)  mg/5 mL syrup Past Week at Unknown time Child No Yes   Sig: Take 10 mL by mouth every 4 (four) hours as needed for cough   felodipine (PLENDIL) 5 mg 24 hr tablet 9/11/2020 at Unknown time  No Yes   Sig: Take 1 tablet (5 mg total) by mouth daily   finasteride (PROSCAR) 5 mg tablet 9/11/2020 at Unknown time  No Yes   Sig: Take 1 tablet (5 mg total) by mouth daily   fluocinonide (LIDEX) 0 05 % cream   No No   Sig: Apply topically 2 (two) times a day   fluticasone-vilanterol (BREO ELLIPTA) 100-25 mcg/inh inhaler 9/11/2020 at Unknown time Child No Yes   Sig: Inhale 1 puff daily Rinse mouth after use     furosemide (LASIX) 40 mg tablet 9/11/2020 at Unknown time  No Yes   Sig: TAKE ONE TABLET(40 MG) IN AM AND HALF TABLET (20 MG) IN PM   glucosamine-chondroitin 500-400 MG tablet 9/11/2020 at Unknown time Child Yes Yes   Sig: Take 1 tablet by mouth 2 (two) times a day  ipratropium-albuterol (DUO-NEB) 0 5-2 5 mg/3 mL nebulizer solution 9/11/2020 at Unknown time  No Yes   Sig: USE 1 VIAL IN NEBULIZER 3 TIMES DAILY   lactase (LACTAID) 3,000 units tablet Past Week at Unknown time Child Yes Yes   Sig: Take 3,000 Units by mouth as needed     psyllium (METAMUCIL) 58 6 % powder 9/11/2020 at Unknown time Child Yes Yes   Sig: Take 1 packet by mouth daily as needed    rivaroxaban (XARELTO) 15 mg tablet 9/11/2020 at Unknown time Child No Yes   Sig: Take 1 tablet (15 mg total) by mouth daily with breakfast   Patient taking differently: Take 20 mg by mouth daily with breakfast    simvastatin (ZOCOR) 20 mg tablet 9/11/2020 at Unknown time  No Yes   Sig: Take 1 tablet (20 mg total) by mouth daily at bedtime   traMADol (ULTRAM) 50 mg tablet Past Month at Unknown time Child Yes Yes   Sig: Take 50 mg by mouth every 6 (six) hours as needed for moderate pain      Facility-Administered Medications: None       Past Medical History:   Diagnosis Date    Arthritis     Atrial flutter (HCC)     Chronic kidney disease     stage 3    Coronary artery disease     2 stents    Fluid retention     Gout     Heart failure (HCC)     pacemaker    Hypertension     Pacemaker     Pulmonary emphysema ([address] )     Radiculopathy     last assessed 1/28/16     Shortness of breath     exertion    Sleep apnea     c pap       Past Surgical History:   Procedure Laterality Date    ANGIOPLASTY      x2 2 stents and then replaced    CARDIAC PACEMAKER PLACEMENT      pacemaker permanent placement dual chamber / last assessed 4/7/14 / implantation     CARDIAC SURGERY      pacemaker    CHOLECYSTECTOMY      CORONARY ANGIOPLASTY WITH STENT PLACEMENT      EPIDURAL BLOCK INJECTION N/A 5/26/2016    Procedure: BLOCK / INJECTION EPIDURAL STEROID LUMBAR  L4-5;  Surgeon: Raymundo Almendarez MD;  Location: [facility] MAIN OR;  Service:    Xena Girard EPIDURAL BLOCK INJECTION N/A 2019    Procedure: L4 L5 Lumbar Epidural Steroid Injection;  Surgeon: Dania Chandler MD;  Location: [facility] MAIN OR;  Service: Pain Management     EYE SURGERY      cataract left    KNEE ARTHROSCOPY W/ MENISCAL REPAIR Left     LUMBAR EPIDURAL INJECTION N/A 3/17/2016    Procedure: BLOCK / INJECTION LUMBAR  L4-5  (C-ARM); Surgeon: Dania Chandler MD;  Location: [facility] MAIN OR;  Service:        Family History   Problem Relation Age of Onset    Cancer Mother 80    Heart disease Mother     Hypertension Mother     Heart disease Father     Diabetes Neg Hx     Stroke Neg Hx      I have reviewed and agree with the history as documented  E-Cigarette/Vaping    E-Cigarette Use Never User      E-Cigarette/Vaping Substances     Social History     Tobacco Use    Smoking status: Former Smoker     Packs/day: 1 00     Years: 50 00     Pack years: 50 00     Types: Cigarettes     Last attempt to quit: 3/27/2019     Years since quittin 4    Smokeless tobacco: Never Used    Tobacco comment: quit 2 weeks ago   Substance Use Topics    Alcohol use: Never     Frequency: Never    Drug use: No       Review of Systems   Constitutional: Negative for chills and fever  HENT: Negative for congestion and rhinorrhea  Eyes: Negative for photophobia and visual disturbance  Respiratory: Positive for shortness of breath  Negative for cough and chest tightness  Cardiovascular: Positive for leg swelling  Negative for chest pain and palpitations  Gastrointestinal: Negative for abdominal pain, constipation, diarrhea, nausea and vomiting  Genitourinary: Negative for dysuria, flank pain and hematuria  Musculoskeletal: Negative for back pain and neck pain  Skin: Negative for pallor and rash  Neurological: Negative for dizziness, weakness, light-headedness, numbness and headaches  Physical Exam  Physical Exam  Vitals signs and nursing note reviewed     Constitutional:       General: He is not in acute distress  Appearance: He is well-developed  He is obese  He is not ill-appearing, toxic-appearing or diaphoretic  HENT:      Head: Normocephalic and atraumatic  Right Ear: External ear normal       Left Ear: External ear normal       Nose: Nose normal       Mouth/Throat:      Pharynx: No oropharyngeal exudate or posterior oropharyngeal erythema  Eyes:      Extraocular Movements: Extraocular movements intact  Conjunctiva/sclera: Conjunctivae normal       Pupils: Pupils are equal, round, and reactive to light  Neck:      Musculoskeletal: Normal range of motion and neck supple  Trachea: No tracheal deviation  Cardiovascular:      Rate and Rhythm: Normal rate and regular rhythm  Heart sounds: Normal heart sounds  No murmur  No friction rub  No gallop  Pulmonary:      Effort: Pulmonary effort is normal  No tachypnea, accessory muscle usage or respiratory distress  Breath sounds: Normal air entry  No decreased air movement  Wheezing present  No decreased breath sounds, rhonchi or rales  Comments: Able to speak in full sentences  No respiratory distress    Chest:      Chest wall: No tenderness  Abdominal:      General: Bowel sounds are normal  There is no distension  Palpations: Abdomen is soft  Tenderness: There is no abdominal tenderness  There is no guarding or rebound  Musculoskeletal: Normal range of motion  General: No tenderness  Right lower leg: Edema present  Left lower leg: Edema present  Skin:     General: Skin is warm and dry  Capillary Refill: Capillary refill takes less than 2 seconds  Findings: No erythema  Neurological:      General: No focal deficit present  Mental Status: He is alert and oriented to person, place, and time     Psychiatric:         Mood and Affect: Mood normal          Behavior: Behavior normal          Vital Signs  ED Triage Vitals [09/11/20 2142]   Temperature Pulse Respirations Blood Pressure SpO2   98 8 °F (37 1 °C) 68 22 118/55 (!) 86 %      Temp Source Heart Rate Source Patient Position - Orthostatic VS BP Location FiO2 (%)   Oral Monitor Sitting Left arm --      Pain Score       --           Vitals:    09/11/20 2142 09/11/20 2300 09/12/20 0000   BP: 118/55 117/58 122/60   Pulse: 68 70 70   Patient Position - Orthostatic VS: Sitting Sitting Lying         Visual Acuity      ED Medications  Medications   azithromycin (ZITHROMAX) 500 mg in sodium chloride 0 9% 250mL IVPB 500 mg (500 mg Intravenous New Bag 9/12/20 0055)   ipratropium-albuterol (DUO-NEB) 0 5-2 5 mg/3 mL inhalation solution 3 mL (3 mL Nebulization Given 9/11/20 2231)   predniSONE tablet 60 mg (60 mg Oral Given 9/11/20 2231)   cefTRIAXone (ROCEPHIN) IVPB (premix) 1,000 mg 50 mL (0 mg Intravenous Stopped 9/12/20 0019)   sodium chloride 0 9 % bolus 500 mL (500 mL Intravenous New Bag 9/11/20 2352)       Diagnostic Studies  Results Reviewed     Procedure Component Value Units Date/Time    Blood culture #2 [[id number]] Collected:  09/11/20 2358    Lab Status: In process Specimen:  Blood from Arm, Right Updated:  09/12/20 0013    Blood culture #1 [[id number]] Collected:  09/11/20 2358    Lab Status:   In process Specimen:  Blood from Hand, Left Updated:  09/12/20 0013    NT-BNP PRO [[id number]]  (Abnormal) Collected:  09/11/20 2256    Lab Status:  Final result Specimen:  Blood from Vein Updated:  09/11/20 2326     NT-proBNP 1,892 pg/mL     Troponin I [[id number]]  (Normal) Collected:  09/11/20 2256    Lab Status:  Final result Specimen:  Blood from Vein Updated:  09/11/20 2323     Troponin I 0 04 ng/mL     Comprehensive metabolic panel [[id number]]  (Abnormal) Collected:  09/11/20 2256    Lab Status:  Final result Specimen:  Blood from Vein Updated:  09/11/20 2320     Sodium 140 mmol/L      Potassium 5 0 mmol/L      Chloride 102 mmol/L      CO2 30 mmol/L      ANION GAP 8 mmol/L      BUN 31 mg/dL      Creatinine 2 00 mg/dL Glucose 131 mg/dL      Calcium 9 2 mg/dL      AST 48 U/L      ALT 37 U/L      Alkaline Phosphatase 64 U/L      Total Protein 7 7 g/dL      Albumin 2 9 g/dL      Total Bilirubin 0 60 mg/dL      eGFR 31 ml/min/1 73sq m     Narrative:       [place] guidelines for Chronic Kidney Disease (CKD):     Stage 1 with normal or high GFR (GFR > 90 mL/min/1 73 square meters)    Stage 2 Mild CKD (GFR = 60-89 mL/min/1 73 square meters)    Stage 3A Moderate CKD (GFR = 45-59 mL/min/1 73 square meters)    Stage 3B Moderate CKD (GFR = 30-44 mL/min/1 73 square meters)    Stage 4 Severe CKD (GFR = 15-29 mL/min/1 73 square meters)    Stage 5 End Stage CKD (GFR <15 mL/min/1 73 square meters)  Note: GFR calculation is accurate only with a steady state creatinine    CBC and differential [[id number]]  (Abnormal) Collected:  09/11/20 2257    Lab Status:  Final result Specimen:  Blood from Vein Updated:  09/11/20 2304     WBC 8 58 Thousand/uL      RBC 4 02 Million/uL      Hemoglobin 12 1 g/dL      Hematocrit 38 0 %      MCV 95 fL      MCH 30 1 pg      MCHC 31 8 g/dL      RDW 15 1 %      MPV 9 6 fL      Platelets 748 Thousands/uL      nRBC 0 /100 WBCs      Neutrophils Relative 66 %      Immat GRANS % 1 %      Lymphocytes Relative 19 %      Monocytes Relative 6 %      Eosinophils Relative 7 %      Basophils Relative 1 %      Neutrophils Absolute 5 68 Thousands/µL      Immature Grans Absolute 0 08 Thousand/uL      Lymphocytes Absolute 1 60 Thousands/µL      Monocytes Absolute 0 54 Thousand/µL      Eosinophils Absolute 0 58 Thousand/µL      Basophils Absolute 0 10 Thousands/µL                  XR chest 1 view portable   Final Result by Olena Murphy MD (09/11 2304)      Increased hazy airspace disease throughout the right middle and lower lung fields consistent with pneumonia or asymmetric pulmonary edema            Workstation performed: [id number]                    Procedures  ECG 12 Lead Documentation Only    Date/Time: 9/11/2020 11:16 PM  Performed by: Izabela Faustin DO  Authorized by: Izabela Faustin DO     Indications / Diagnosis:  Sob  ECG reviewed by me, the ED Provider: yes    Patient location:  ED  Previous ECG:     Previous ECG:  Compared to current    Comparison ECG info:  6/24/2019    Similarity:  No change  Interpretation:     Interpretation: non-specific    Rate:     ECG rate:  70    ECG rate assessment: normal    Rhythm:     Rhythm: paced    Pacing:     Capture:  Complete    Type of pacing:  Ventricular  QRS:     QRS axis:  Left    QRS intervals: Wide  ST segments:     ST segments:  Non-specific  Comments:      qtc [id number]             ED Course  ED Course as of Sep 12 0057   Fri Sep 11, 2020   2323 Mildly elevated creatinine (baseline 1 3-1 6)   Creatinine(!): 2 00                                       MDM  Number of Diagnoses or Management Options  Hypoxia:   Pneumonia:   SOB (shortness of breath):   Diagnosis management comments: Assessment and plan:  51-year-old male with shortness of breath and hypoxia status post sexual intercourse  Will check labs to evaluate for anemia, electrolyte abnormalities, EKG and troponin to rule out ischemia, chest x-ray to rule out pneumonia  Supplemental oxygen with 2 L nasal cannula  Patient is diffusely wheezy on exam   Will give steroids, DuoNeb  Reassess  Patient trialed off of supplemental oxygen and dropped his oxygen saturation to 87%  On chest x-ray, patient has findings concerning for left-sided pneumonia  Will treat with IV antibiotics and patient will require admission        Disposition  Final diagnoses:   Pneumonia   SOB (shortness of breath)   Hypoxia     Time reflects when diagnosis was documented in both MDM as applicable and the Disposition within this note     Time User Action Codes Description Comment    9/11/2020 11:26 PM [address] [J18 9] Pneumonia     9/11/2020 11:26 PM Zenon Giles Add [R06 02] SOB (shortness of breath) 9/11/2020 11:26 PM June Bhakta Add [R09 02] Hypoxia       ED Disposition     ED Disposition Condition Date/Time Comment    Admit Stable Fri Sep 11, 2020 11:26 PM Case was discussed with Frances Sheffield and the patient's admission status was agreed to be Admission Status: inpatient status to the service of Dr Cuong Saldivar   Follow-up Information    None         Current Discharge Medication List      CONTINUE these medications which have NOT CHANGED    Details   albuterol (VENTOLIN HFA) 90 mcg/act inhaler Inhale 2 puffs every 6 (six) hours as needed for wheezing  Qty: 1 Inhaler, Refills: 6    Comments: Substitution to a formulary equivalent within the same pharmaceutical class is authorized  Associated Diagnoses: Chronic obstructive pulmonary disease, unspecified COPD type ([address] )      allopurinol (ZYLOPRIM) 300 mg tablet Take 1 tablet (300 mg total) by mouth daily  Qty: 90 tablet, Refills: 3    Associated Diagnoses: Hyperuricemia      ascorbic acid (VITAMIN C) 500 mg tablet Take 500 mg by mouth daily  B Complex Vitamins (B COMPLEX 1 PO) Take 1 tablet by mouth daily  Biotin (BIOTIN 5000) 5 MG CAPS Take 1,000 mcg by mouth daily  calcium carbonate-vitamin D (OSCAL-D) 500 mg-200 units per tablet Take 2 tablets by mouth daily at bedtime  clopidogrel (PLAVIX) 75 mg tablet Take 1 tablet (75 mg total) by mouth daily  Qty: 90 tablet, Refills: 3    Associated Diagnoses: Coronary artery arteriosclerosis      Cranberry 1000 MG CAPS Take 1 tablet by mouth 2 (two) times a day        dextromethorphan-guaiFENesin (ROBITUSSIN DM)  mg/5 mL syrup Take 10 mL by mouth every 4 (four) hours as needed for cough  Qty: 118 mL, Refills: 0    Associated Diagnoses: Pneumonia      felodipine (PLENDIL) 5 mg 24 hr tablet Take 1 tablet (5 mg total) by mouth daily  Qty: 90 tablet, Refills: 3    Associated Diagnoses: Essential hypertension      finasteride (PROSCAR) 5 mg tablet Take 1 tablet (5 mg total) by mouth daily  Qty: 90 tablet, Refills: 3    Associated Diagnoses: Prostate disorder      Flaxseed, Linseed, (EQL FLAX SEED OIL) 1000 MG CAPS Take by mouth daily  fluticasone-vilanterol (BREO ELLIPTA) 100-25 mcg/inh inhaler Inhale 1 puff daily Rinse mouth after use  Qty: 3 Inhaler, Refills: 3    Associated Diagnoses: Mixed simple and mucopurulent chronic bronchitis (HCC)      furosemide (LASIX) 40 mg tablet TAKE ONE TABLET(40 MG) IN AM AND HALF TABLET (20 MG) IN PM  Qty: 135 tablet, Refills: 3    Associated Diagnoses: Chronic atrial fibrillation (HCC)      glucosamine-chondroitin 500-400 MG tablet Take 1 tablet by mouth 2 (two) times a day  ipratropium-albuterol (DUO-NEB) 0 5-2 5 mg/3 mL nebulizer solution USE 1 VIAL IN NEBULIZER 3 TIMES DAILY  Qty: 90 vial, Refills: 11    Associated Diagnoses: Mixed simple and mucopurulent chronic bronchitis (HCC)      lactase (LACTAID) 3,000 units tablet Take 3,000 Units by mouth as needed  Omega-3 Fatty Acids (FISH OIL) 1,000 mg Take 1,000 mg by mouth 2 (two) times a day        PARoxetine (PAXIL) 20 mg tablet TAKE 1/2 TABLET BY MOUTH DAILY  Qty: 45 tablet, Refills: 4    Associated Diagnoses: Anxiety 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      psyllium (METAMUCIL) 58 6 % powder Take 1 packet by mouth daily as needed       rivaroxaban (XARELTO) 15 mg tablet Take 1 tablet (15 mg total) by mouth daily with breakfast    Associated Diagnoses: Chronic a-fib (HCC)      simvastatin (ZOCOR) 20 mg tablet Take 1 tablet (20 mg total) by mouth daily at bedtime  Qty: 90 tablet, Refills: 3    Associated Diagnoses: Coronary artery arteriosclerosis      traMADol (ULTRAM) 50 mg tablet Take 50 mg by mouth every 6 (six) hours as needed for moderate pain      colchicine (COLCRYS) 0 6 mg tablet Take 1 tablet (0 6 mg total) by mouth 2 (two) times a day Take twice daily x 7 days as needed for gout  Qty: 30 tablet, Refills: 5    Associated Diagnoses: Chronic gout without tophus, unspecified cause, unspecified site cyanocobalamin 1000 MCG tablet Take 100 mcg by mouth daily      fluocinonide (LIDEX) 0 05 % cream Apply topically 2 (two) times a day  Qty: 120 g, Refills: 2    Associated Diagnoses: Dermatophytosis           No discharge procedures on file      PDMP Review     None          ED Provider  Electronically Signed by           Micheline Griffith DO  09/12/20 0104

## 2020-09-13 PROBLEM — D64.9 ANEMIA: Status: ACTIVE | Noted: 2020-09-13

## 2020-09-13 PROBLEM — R78.81 GRAM-NEGATIVE BACTEREMIA: Status: ACTIVE | Noted: 2020-09-13

## 2020-09-13 LAB
ANION GAP SERPL CALCULATED.3IONS-SCNC: 9 MMOL/L (ref 4–13)
BACTERIA UR QL AUTO: ABNORMAL /HPF
BILIRUB UR QL STRIP: NEGATIVE
BUN SERPL-MCNC: 35 MG/DL (ref 5–25)
CALCIUM SERPL-MCNC: 8.6 MG/DL (ref 8.3–10.1)
CHLORIDE SERPL-SCNC: 105 MMOL/L (ref 100–108)
CLARITY UR: CLEAR
CO2 SERPL-SCNC: 27 MMOL/L (ref 21–32)
COLOR UR: ABNORMAL
CREAT SERPL-MCNC: 1.54 MG/DL (ref 0.6–1.3)
ERYTHROCYTE [DISTWIDTH] IN BLOOD BY AUTOMATED COUNT: 15.1 % (ref 11.6–15.1)
GFR SERPL CREATININE-BSD FRML MDRD: 42 ML/MIN/1.73SQ M
GLUCOSE SERPL-MCNC: 117 MG/DL (ref 65–140)
GLUCOSE UR STRIP-MCNC: NEGATIVE MG/DL
HCT VFR BLD AUTO: 33.9 % (ref 36.5–49.3)
HGB BLD-MCNC: 10.8 G/DL (ref 12–17)
HGB UR QL STRIP.AUTO: ABNORMAL
KETONES UR STRIP-MCNC: NEGATIVE MG/DL
LEUKOCYTE ESTERASE UR QL STRIP: ABNORMAL
MAGNESIUM SERPL-MCNC: 2.3 MG/DL (ref 1.6–2.6)
MCH RBC QN AUTO: 30 PG (ref 26.8–34.3)
MCHC RBC AUTO-ENTMCNC: 31.9 G/DL (ref 31.4–37.4)
MCV RBC AUTO: 94 FL (ref 82–98)
MRSA NOSE QL CULT: NORMAL
NITRITE UR QL STRIP: NEGATIVE
NON-SQ EPI CELLS URNS QL MICRO: ABNORMAL /HPF
PH UR STRIP.AUTO: 6 [PH]
PLATELET # BLD AUTO: 140 THOUSANDS/UL (ref 149–390)
PMV BLD AUTO: 10.1 FL (ref 8.9–12.7)
POTASSIUM SERPL-SCNC: 3.5 MMOL/L (ref 3.5–5.3)
PROCALCITONIN SERPL-MCNC: 0.19 NG/ML
PROT UR STRIP-MCNC: NEGATIVE MG/DL
RBC # BLD AUTO: 3.6 MILLION/UL (ref 3.88–5.62)
RBC #/AREA URNS AUTO: ABNORMAL /HPF
SODIUM SERPL-SCNC: 141 MMOL/L (ref 136–145)
SP GR UR STRIP.AUTO: 1.01 (ref 1–1.03)
UROBILINOGEN UR QL STRIP.AUTO: 0.2 E.U./DL
WBC # BLD AUTO: 9.08 THOUSAND/UL (ref 4.31–10.16)
WBC #/AREA URNS AUTO: ABNORMAL /HPF

## 2020-09-13 PROCEDURE — 94760 N-INVAS EAR/PLS OXIMETRY 1: CPT

## 2020-09-13 PROCEDURE — 81001 URINALYSIS AUTO W/SCOPE: CPT | Performed by: INTERNAL MEDICINE

## 2020-09-13 PROCEDURE — 84145 PROCALCITONIN (PCT): CPT | Performed by: NURSE PRACTITIONER

## 2020-09-13 PROCEDURE — 97163 PT EVAL HIGH COMPLEX 45 MIN: CPT

## 2020-09-13 PROCEDURE — 87040 BLOOD CULTURE FOR BACTERIA: CPT | Performed by: INTERNAL MEDICINE

## 2020-09-13 PROCEDURE — 83735 ASSAY OF MAGNESIUM: CPT | Performed by: INTERNAL MEDICINE

## 2020-09-13 PROCEDURE — 99222 1ST HOSP IP/OBS MODERATE 55: CPT | Performed by: INTERNAL MEDICINE

## 2020-09-13 PROCEDURE — 94660 CPAP INITIATION&MGMT: CPT

## 2020-09-13 PROCEDURE — 80048 BASIC METABOLIC PNL TOTAL CA: CPT | Performed by: INTERNAL MEDICINE

## 2020-09-13 PROCEDURE — 85027 COMPLETE CBC AUTOMATED: CPT | Performed by: INTERNAL MEDICINE

## 2020-09-13 PROCEDURE — 94640 AIRWAY INHALATION TREATMENT: CPT

## 2020-09-13 PROCEDURE — 99232 SBSQ HOSP IP/OBS MODERATE 35: CPT | Performed by: INTERNAL MEDICINE

## 2020-09-13 PROCEDURE — 87086 URINE CULTURE/COLONY COUNT: CPT | Performed by: INTERNAL MEDICINE

## 2020-09-13 RX ORDER — LANOLIN ALCOHOL/MO/W.PET/CERES
6 CREAM (GRAM) TOPICAL
Status: DISCONTINUED | OUTPATIENT
Start: 2020-09-13 | End: 2020-09-15 | Stop reason: HOSPADM

## 2020-09-13 RX ADMIN — FUROSEMIDE 40 MG: 10 INJECTION, SOLUTION INTRAMUSCULAR; INTRAVENOUS at 09:42

## 2020-09-13 RX ADMIN — PRAVASTATIN SODIUM 40 MG: 40 TABLET ORAL at 17:08

## 2020-09-13 RX ADMIN — IPRATROPIUM BROMIDE AND ALBUTEROL SULFATE 3 ML: 2.5; .5 SOLUTION RESPIRATORY (INHALATION) at 19:27

## 2020-09-13 RX ADMIN — FLUTICASONE FUROATE AND VILANTEROL TRIFENATATE 1 PUFF: 100; 25 POWDER RESPIRATORY (INHALATION) at 09:41

## 2020-09-13 RX ADMIN — OYSTER SHELL CALCIUM WITH VITAMIN D 2 TABLET: 500; 200 TABLET, FILM COATED ORAL at 09:41

## 2020-09-13 RX ADMIN — Medication 1000 MG: at 18:54

## 2020-09-13 RX ADMIN — FINASTERIDE 5 MG: 5 TABLET, FILM COATED ORAL at 09:42

## 2020-09-13 RX ADMIN — IPRATROPIUM BROMIDE AND ALBUTEROL SULFATE 3 ML: 2.5; .5 SOLUTION RESPIRATORY (INHALATION) at 02:50

## 2020-09-13 RX ADMIN — OXYCODONE HYDROCHLORIDE AND ACETAMINOPHEN 500 MG: 500 TABLET ORAL at 09:42

## 2020-09-13 RX ADMIN — PAROXETINE HYDROCHLORIDE 10 MG: 20 TABLET, FILM COATED ORAL at 09:42

## 2020-09-13 RX ADMIN — CEFTRIAXONE 1000 MG: 1 INJECTION, SOLUTION INTRAVENOUS at 21:25

## 2020-09-13 RX ADMIN — ALLOPURINOL 300 MG: 300 TABLET ORAL at 09:42

## 2020-09-13 RX ADMIN — Medication 1000 MG: at 09:41

## 2020-09-13 RX ADMIN — RIVAROXABAN 20 MG: 10 TABLET, FILM COATED ORAL at 17:08

## 2020-09-13 RX ADMIN — VITAM B12 100 MCG: 100 TAB at 09:41

## 2020-09-13 RX ADMIN — FELODIPINE 5 MG: 2.5 TABLET, FILM COATED, EXTENDED RELEASE ORAL at 09:41

## 2020-09-13 RX ADMIN — Medication 6 MG: at 22:39

## 2020-09-13 RX ADMIN — IPRATROPIUM BROMIDE AND ALBUTEROL SULFATE 3 ML: 2.5; .5 SOLUTION RESPIRATORY (INHALATION) at 07:33

## 2020-09-13 RX ADMIN — IPRATROPIUM BROMIDE AND ALBUTEROL SULFATE 3 ML: 2.5; .5 SOLUTION RESPIRATORY (INHALATION) at 14:42

## 2020-09-13 RX ADMIN — CLOPIDOGREL BISULFATE 75 MG: 75 TABLET ORAL at 09:43

## 2020-09-13 NOTE — PROGRESS NOTES
[facility] Internal Medicine Progress Note  Patient: Hosea Ware [de-identified] y o  male   MRN: [id number]  PCP: Misty Obrien DO  Unit/Bed#: [address] [place] [address] Encounter: [id number]  Date Of Visit: 09/13/20    Problem List:    Principal Problem:    Acute respiratory failure with hypoxia ([address] )  Active Problems:    Acute on chronic diastolic congestive heart failure (HCC)    Gram-negative bacteremia    COPD (chronic obstructive pulmonary disease) ([facility])    CKD (chronic kidney disease) stage 3, GFR 30-59 ml/min (HCC)    CAD (coronary artery disease)    Pneumonia    Anemia    Chronic a-fib ([facility])    Chronic gout without tophus    JOVI (obstructive sleep apnea)      Assessment & Plan:    Gram-negative bacteremia  Assessment & Plan  1/2 blood culture noted to be positive for Gram-negative bacteria  Unclear source  Patient denies any  or GI symptoms except good urine output with diuretics  · Repeat blood culture  · Check UA, urine culture  · Continue Rocephin for now  · Follow-up culture sensitivity  · Trend procalcitonin    Acute on chronic diastolic congestive heart failure (HCC)  Assessment & Plan  Wt Readings from Last 3 Encounters:   09/13/20 136 kg (299 lb 2 6 oz)   08/26/20 (!) 141 kg (310 lb)   06/10/20 (!) 141 kg (310 lb)     Patient reports that he gained 4 lbs after going out to dinner followed by breakfast the next day  He had increased edema  He was short of breath with exertion evening  Clinical presentation consistent with CHF  Chest x-ray-air space disease on the right middle and lower lobe  CT scan diffuse infiltrate right more than left  Lymphadenopathy noted  Prior echocardiogram EF 55%,  Improved with IV Lasix  · telemetry  · Serial cardiac marker-negative  · Likely transition to p o   Diuretics today  · Daily weight, intake output  · Salt and fluid restriction, discussed with patient  · Cardiology evaluation    * Acute respiratory failure with hypoxia (HCC)  Assessment & Plan  Oxygen saturation in the low 80s at home, high 80s on arrival to the ER  Likely secondary to CHF  Improving after IV Lasix  Currently saturating adequately on room air  · Continue supplemental oxygen, taper of as tolerated    Pneumonia  Assessment & Plan  Patient was exerting himself this evening and developed shortness of breath  He denies any fevers, cough or chills  Denies sick on  Patient was treated with azithromycin last week for URI, negative CXR at that time  With clinical improvement  Pneumonia suspect on presentation based on radiographic unilateral finding  Patient is afebrile with normal white count  Doubt bacterial pneumonia    · Monitor clinically  · SARS-CoV-2 PCR-negative  · Urine Legionella pneumococcal antigen negative  · Continue rocephin as above but discontinue azithromycin   · Trend procalcitonin follow-up blood culture sensitivities  · Supportive therapies with araseli, respiratory protocol  · Repeat imaging/CT scan as outpatient    CAD (coronary artery disease)  Assessment & Plan  History of PCI to the LADx2, RCA and circumflex,   Serial troponins-negative  Continue plavix, metoprolol, statin    CKD (chronic kidney disease) stage 3, GFR 30-59 ml/min ([facility])  Assessment & Plan  Baseline Cr 1 2-1 5, elevated at 2 0  Likely cardiorenal  Creatinine improved to baseline with diuresis  Monitor intake output  Follow-up BMP    COPD (chronic obstructive pulmonary disease) ([facility])  Assessment & Plan  No evidence of exacerbation  Continue duonebs and breo    Anemia  Assessment & Plan  Normocytic, hemoglobin decline from 12 1-10 8 during hospitalization  No overt bleeding  Monitor    JOVI (obstructive sleep apnea)  Assessment & Plan  Continue CPAP    Chronic gout without tophus  Assessment & Plan  continue allopurinol     Chronic a-fib ([facility])  Assessment & Plan  History of complete heart block post pacemaker  Continue metoprolol and xarelto        VTE Pharmacologic Prophylaxis:   Pharmacologic: Rivaroxaban (Xarelto)  Mechanical VTE Prophylaxis in Place: Yes    Patient Centered Rounds: I have performed bedside rounds with nursing staff today  Discussions with Specialists or Other Care Team Provider:  Yes    Education and Discussions with Family / Patient:  Daughter at bedside    Time Spent for Care: 45 minutes  More than 50% of total time spent on counseling and coordination of care as described above  Current Length of Stay: 2 day(s)    Current Patient Status: Inpatient   Certification Statement: The patient will continue to require additional inpatient hospital stay due to Respiratory failure, decompensated CHF    Discharge Plan:  Home in next 24-48 hours    Code Status: Level 1 - Full Code      Subjective:   Feels better  Breathing is improved  Denies any chest pain palpitation or dizziness  Anxious to go home  Denies any fever chills urinary symptoms  Denies any abdominal pain nausea vomiting or any other GI symptoms    Requesting to have shower    Surprisingly 1/2 blood culture noted to have Gram-positive rods    Objective:     Vitals:   Temp (24hrs), Av 6 °F (36 4 °C), Min:97 3 °F (36 3 °C), Max:98 °F (36 7 °C)    Temp:  [97 3 °F (36 3 °C)-98 °F (36 7 °C)] 97 6 °F (36 4 °C)  HR:  [66-73] 70  Resp:  [18-25] 19  BP: (112-134)/(58-69) 113/60  SpO2:  [91 %-99 %] 99 % room air  Body mass index is 39 47 kg/m²  Input and Output Summary (last 24 hours): Intake/Output Summary (Last 24 hours) at 2020 1013  Last data filed at 2020 0729  Gross per 24 hour   Intake 855 ml   Output 1200 ml   Net -345 ml       Physical Exam:     Physical Exam  Constitutional:       General: He is not in acute distress  Appearance: He is obese  HENT:      Head: Normocephalic and atraumatic  Eyes:      Pupils: Pupils are equal, round, and reactive to light  Neck:      Musculoskeletal: Normal range of motion and neck supple  Cardiovascular:      Rate and Rhythm: Normal rate and regular rhythm        Heart sounds: Murmur present  Pulmonary:      Effort: Pulmonary effort is normal  No respiratory distress  Breath sounds: No wheezing, rhonchi or rales  Comments: Diminished  Chest:      Chest wall: No tenderness  Abdominal:      General: Bowel sounds are normal  There is no distension  Palpations: Abdomen is soft  Tenderness: There is no abdominal tenderness  There is no guarding or rebound  Musculoskeletal:      Comments: Edema improving   Skin:     General: Skin is warm and dry  Findings: No rash  Neurological:      Mental Status: He is alert  Cranial Nerves: No cranial nerve deficit  Additional Data:     Labs:    Results from last 7 days   Lab Units 09/13/20  0446 09/12/20  0605   WBC Thousand/uL 9 08 9 49   HEMOGLOBIN g/dL 10 8* 11 9*   HEMATOCRIT % 33 9* 36 9   PLATELETS Thousands/uL 140* 153   NEUTROS PCT %  --  82*   LYMPHS PCT %  --  10*   MONOS PCT %  --  4   EOS PCT %  --  2     Results from last 7 days   Lab Units 09/13/20  0446  09/11/20  2256   POTASSIUM mmol/L 3 5   < > 5 0   CHLORIDE mmol/L 105   < > 102   CO2 mmol/L 27   < > 30   BUN mg/dL 35*   < > 31*   CREATININE mg/dL 1 54*   < > 2 00*   CALCIUM mg/dL 8 6   < > 9 2   ALK PHOS U/L  --   --  64   ALT U/L  --   --  37   AST U/L  --   --  48*    < > = values in this interval not displayed  * I Have Reviewed All Lab Data Listed Above  * Additional Pertinent Lab Tests Reviewed: All Labs Within Last 24 Hours Reviewed      Imaging:  Imaging Reports Reviewed Today Include:  Chest x-ray and CT scan  Recent Cultures (last 7 days):     Results from last 7 days   Lab Units 09/12/20  0410 09/11/20  2358   BLOOD CULTURE   --  Received in Microbiology Lab  Culture in Progress     GRAM STAIN RESULT   --  Gram negative rods*   LEGIONELLA URINARY ANTIGEN  Negative  --        Last 24 Hours Medication List:   Current Facility-Administered Medications   Medication Dose Route Frequency Provider Last Rate    allopurinol  300 mg Oral Daily Javi Hand, CRNP      ascorbic acid  500 mg Oral Daily Javi Hand, CRNP      calcium carbonate-vitamin D  2 tablet Oral Daily With Breakfast Javi Hand, CRNP      cefTRIAXone  1,000 mg Intravenous Q24H Javi Hand, CRNP Stopped (09/12/20 2145)    clopidogrel  75 mg Oral Daily Javi Hand, CRNP      cyanocobalamin  100 mcg Oral Daily Javi Hand, CRNP      felodipine  5 mg Oral Daily Javi Hand, CRNP      finasteride  5 mg Oral Daily Javi Hand, CRNP      fish oil  1,000 mg Oral BID Javi Hand, CRNP      fluticasone-vilanterol  1 puff Inhalation Daily Javi Hand, CRNP      furosemide  40 mg Intravenous Daily Javi Hand, CRNP      ipratropium-albuterol  3 mL Nebulization Q6H Javi Hand, CRNP      ondansetron  4 mg Intravenous Q6H PRN Javi Hand, CRNP      PARoxetine  10 mg Oral Daily Javi Hand, CRNP      pravastatin  40 mg Oral Daily With Dinner Javi Hand, CRNP      rivaroxaban  20 mg Oral Daily With [address], CRNP            Today, Patient Was Seen By: Marisol Hensley MD    ** Please Note: "This note has been constructed using a voice recognition system  Therefore there may be syntax, spelling, and/or grammatical errors   Please call if you have any questions  "**

## 2020-09-13 NOTE — CONSULTS
Consultation - Cardiology   [facility]     Jean Cantu [de-identified] y o  male MRN: [id number]  : 1940  Unit/Bed#: [address] [place] [address] Encounter: [id number]      Assessment & Plan     1  Shortness of breath  Etiology may be multifactorial   CT of the chest shows right-sided pneumonia with some evidence of volume overload  He is on antibiotics  Patient denies any fever  Management as per medical team   Darby Jones to continue diuretics for now  2  Elevated BNP with pulmonary condition on CT scan  There may be some component of acute on chronic diastolic heart failure  Previously EF around 50-55% does have chronic atrial fibrillation  Okay to continue diuretics  Will assess electrolytes  His current weight is around 299 lb  Has been much heavier in the past   He is trying to lose weight also    3  History of coronary artery disease status post angioplasty of LAD and right coronary artery by me in 2019  No symptoms of angina troponins are negative  Continue statins and Plavix and metoprolol    4  CKD  Known to have serum creatinine around 1 5  Appears to be close to baseline  Can switch to p o  Diuretic from tomorrow    5  Known history of COPD    6  Obstructive sleep apnea on CPAP      7  Chronic atrial fibrillation with 100% paced  Twelve lead EKG reviewed  His pacemaker is of [employer] and is in VVIR mode  8  Dyslipidemia on statins    9  Cardiac murmur with history of moderate aortic stenosis echo Doppler has been ordered  Plan  Continue diuretics today  Follow-up electrolytes  Echo has been ordered  Continue antibiotics and management of pneumonia as per medical team  Has previous history of right upper lobe pneumonia in past       Thank you for your consultation      Physician Requesting Consult: Landy iWlks MD    Reason for Consult / Principal Problem:  Shortness of breath,  elevated BNP and abnormal CT scan    Inpatient consult to Cardiology  Consult performed by: Brad Fisher MD  Consult ordered by: Maryana Ceron MD          HPI: Julisa Clark is a [de-identified]y o  year old male who presents with shortness of breath  Patient is known to me has medical history significant for chronic AFib flutter status post pacemaker, history of coronary artery disease status post angioplasty of LAD and RCA with known 100% occluded circumflex, low normal LV systolic function, history of morbid obesity with loss of 97 lb in the last 1 year, hypertension, dyslipidemia was in the emergency room with shortness of breath  Patient has been doing well since his angioplasty yesterday he felt short of breath  He was being treated for questionable upper respiratory tract infection with azithromycin however it did not improved and he was found to be hypoxic when he came to the emergency room  CT scan shows a right-sided airspace disease along with some vascular congestion and increased BNP  He was given IV Lasix and antibiotics he is feeling better  He denies any fever any chills any nausea and vomiting  His daughter was present bedside  No other issues at this time  He regularly follows up with Dr Earnest Guerra  He has baseline kidney function of around 1 5  Responded well to diuretics  No leg edema  Has some weight gain but now feeling better  Over the last 1 year he has lost about 97 lb  He does 1 hour of exercise without any problem before this episode happened  His troponins are negative  Review of Systems   Constitutional: Negative for activity change, chills, diaphoresis, fever and unexpected weight change  HENT: Negative for congestion  Eyes: Negative for discharge and redness  Respiratory: Positive for shortness of breath  Negative for cough, chest tightness and wheezing  Cardiovascular: Positive for leg swelling  Negative for chest pain and palpitations  Gastrointestinal: Negative for abdominal pain, diarrhea and nausea  Endocrine: Negative  Genitourinary: Negative for decreased urine volume and urgency  Musculoskeletal: Positive for arthralgias and back pain  Negative for gait problem  Skin: Negative for rash and wound  Allergic/Immunologic: Negative  Neurological: Negative for dizziness, seizures, syncope, weakness, light-headedness and headaches  Hematological: Negative  Psychiatric/Behavioral: Negative for agitation and confusion  The patient is nervous/anxious  Historical Information   Past Medical History:   Diagnosis Date    Arthritis     Atrial flutter ([facility] [address] )     Chronic kidney disease     stage 3    Coronary artery disease     2 stents    Fluid retention     Gout     Heart failure (HCC)     pacemaker    Hypertension     Pacemaker     Pulmonary emphysema ([facility] [address] )     Radiculopathy     last assessed 1/28/16     Shortness of breath     exertion    Sleep apnea     c pap     Past Surgical History:   Procedure Laterality Date    ANGIOPLASTY      x2 2 stents and then replaced    CARDIAC PACEMAKER PLACEMENT      pacemaker permanent placement dual chamber / last assessed 4/7/14 / implantation     CARDIAC SURGERY      pacemaker    CHOLECYSTECTOMY      CORONARY ANGIOPLASTY WITH STENT PLACEMENT      EPIDURAL BLOCK INJECTION N/A 5/26/2016    Procedure: BLOCK / INJECTION EPIDURAL STEROID LUMBAR  L4-5;  Surgeon: Vincent Klinefelter, MD;  Location[facility] MAIN OR;  Service:     EPIDURAL BLOCK INJECTION N/A 2/14/2019    Procedure: L4 L5 Lumbar Epidural Steroid Injection;  Surgeon: Vincent Klinefelter, MD;  Location: [facility] MAIN OR;  Service: Pain Management     EYE SURGERY      cataract left    KNEE ARTHROSCOPY W/ MENISCAL REPAIR Left     LUMBAR EPIDURAL INJECTION N/A 3/17/2016    Procedure: BLOCK / INJECTION LUMBAR  L4-5  (C-ARM);   Surgeon: Vincent Klinefelter, MD;  Location: [facility] MAIN OR;  Service:      Social History     Substance and Sexual Activity   Alcohol Use Never    Frequency: Never     Social History     Substance and Sexual Activity   Drug Use No     Social History     Tobacco Use   Smoking Status Former Smoker    Packs/day: 1 00    Years: 50 00    Pack years: 50 00    Types: Cigarettes    Last attempt to quit: 3/27/2019    Years since quittin 4   Smokeless Tobacco Never Used   Tobacco Comment    quit 2 weeks ago     Family History:   Family History   Problem Relation Age of Onset    Cancer Mother 80    Heart disease Mother     Hypertension Mother     Heart disease Father     Diabetes Neg Hx     Stroke Neg Hx        Meds/Allergies    PTA meds:    Medications Prior to Admission   Medication    albuterol (VENTOLIN HFA) 90 mcg/act inhaler    allopurinol (ZYLOPRIM) 300 mg tablet    ascorbic acid (VITAMIN C) 500 mg tablet    B Complex Vitamins (B COMPLEX 1 PO)    Biotin (BIOTIN 5000) 5 MG CAPS    calcium carbonate-vitamin D (OSCAL-D) 500 mg-200 units per tablet    clopidogrel (PLAVIX) 75 mg tablet    Cranberry 1000 MG CAPS    dextromethorphan-guaiFENesin (ROBITUSSIN DM)  mg/5 mL syrup    felodipine (PLENDIL) 5 mg 24 hr tablet    finasteride (PROSCAR) 5 mg tablet    Flaxseed, Linseed, (EQL FLAX SEED OIL) 1000 MG CAPS    fluticasone-vilanterol (BREO ELLIPTA) 100-25 mcg/inh inhaler    furosemide (LASIX) 40 mg tablet    glucosamine-chondroitin 500-400 MG tablet    ipratropium-albuterol (DUO-NEB) 0 5-2 5 mg/3 mL nebulizer solution    lactase (LACTAID) 3,000 units tablet    Omega-3 Fatty Acids (FISH OIL) 1,000 mg    PARoxetine (PAXIL) 20 mg tablet    psyllium (METAMUCIL) 58 6 % powder    rivaroxaban (XARELTO) 15 mg tablet    simvastatin (ZOCOR) 20 mg tablet    traMADol (ULTRAM) 50 mg tablet    colchicine (COLCRYS) 0 6 mg tablet    cyanocobalamin 1000 MCG tablet    fluocinonide (LIDEX) 0 05 % cream      No Known Allergies    Current Facility-Administered Medications:     allopurinol (ZYLOPRIM) tablet 300 mg, 300 mg, Oral, Daily, NAVA Bowie, 300 mg at 20 0942    ascorbic acid (VITAMIN C) tablet 500 mg, 500 mg, Oral, Daily, NAVA Leyva, 500 mg at 09/13/20 0901    calcium carbonate-vitamin D (OSCAL-D) 500 mg-200 units per tablet 2 tablet, 2 tablet, Oral, Daily With Breakfast, NAVA Leyva, 2 tablet at 09/13/20 0941    cefTRIAXone (ROCEPHIN) IVPB (premix) 1,000 mg 50 mL, 1,000 mg, Intravenous, Q24H, Stopped at 09/12/20 2145 **AND** [DISCONTINUED] azithromycin (ZITHROMAX) 500 mg in sodium chloride 0 9 % 250 mL IVPB, 500 mg, Intravenous, Q24H, NAVA Leyva    clopidogrel (PLAVIX) tablet 75 mg, 75 mg, Oral, Daily, NAVA Leyva, 75 mg at 09/13/20 4056    cyanocobalamin (VITAMIN B-12) tablet 100 mcg, 100 mcg, Oral, Daily, NAVA Leyva, 100 mcg at 09/13/20 0941    felodipine (PLENDIL) 24 hr tablet 5 mg, 5 mg, Oral, Daily, NAVA Leyva, 5 mg at 09/13/20 0941    finasteride (PROSCAR) tablet 5 mg, 5 mg, Oral, Daily, NAVA Leyva, 5 mg at 09/13/20 2983    fish oil capsule 1,000 mg, 1,000 mg, Oral, BID, NAVA Leyva, 1,000 mg at 09/13/20 0941    fluticasone-vilanterol (BREO ELLIPTA) 100-25 mcg/inh inhaler 1 puff, 1 puff, Inhalation, Daily, NAVA Leyva, 1 puff at 09/13/20 0941    furosemide (LASIX) injection 40 mg, 40 mg, Intravenous, Daily, NAVA Leyva, 40 mg at 09/13/20 0942    ipratropium-albuterol (DUO-NEB) 0 5-2 5 mg/3 mL inhalation solution 3 mL, 3 mL, Nebulization, Q6H, NAVA Navarrete, 3 mL at 09/13/20 0733    ondansetron (ZOFRAN) injection 4 mg, 4 mg, Intravenous, Q6H PRN, NAVA Leyva    PARoxetine (PAXIL) tablet 10 mg, 10 mg, Oral, Daily, NAVA Leyva, 10 mg at 09/13/20 8234    pravastatin (PRAVACHOL) tablet 40 mg, 40 mg, Oral, Daily With Dinner, NAVA Leyva, 40 mg at 09/12/20 1705    rivaroxaban (XARELTO) tablet 20 mg, 20 mg, Oral, Daily With Dinner, NAVA Leyva, 20 mg at 09/12/20 1705    VTE Pharmacologic Prophylaxis: Xarelto    Objective:   Vitals: Blood pressure 113/60, pulse 70, temperature 97 6 °F (36 4 °C), resp  rate 19, height 6' 1" (1 854 m), weight 136 kg (299 lb 2 6 oz), SpO2 99 %  Body mass index is 39 47 kg/m²  BP Readings from Last 3 Encounters:   09/13/20 113/60   08/26/20 134/66   06/10/20 134/66     Orthostatic Blood Pressures      Most Recent Value   Blood Pressure  113/60 filed at 09/13/2020 4780   Patient Position - Orthostatic VS  Lying filed at 09/12/2020 1921          Intake/Output Summary (Last 24 hours) at 9/13/2020 1246  Last data filed at 9/13/2020 9864  Gross per 24 hour   Intake 615 ml   Output 1200 ml   Net -585 ml       Invasive Devices     Peripheral Intravenous Line            Peripheral IV 09/13/20 Right Forearm less than 1 day                  Physical Exam:   Physical Exam    Neurologic:  Alert & oriented x 3, no new focal deficits, Not in any acute distress,  Constitutional:  Well developed, well nourished, non-toxic appearance   Eyes:  Pupil equal and reacting to light, conjunctiva normal   HENT:  Atraumatic, oropharynx moist, Neck- normal range of motion, no tenderness, supple   Respiratory:  Bilateral air entry, with crackles on right side of the chest  Cardiovascular: S1-S2 regular with a 3/6 ejection systolic murmur and patient is 100% paced  GI:  Soft, nondistended, normal bowel sounds, nontender, no hepatosplenomegaly appreciated  Musculoskeletal:  No edema, no tenderness, no deformities     Skin:  Well hydrated, no rash   Lymphatic:  No lymphadenopathy noted   Extremities:  No significant edema    Labs:   Troponins:   Results from last 7 days   Lab Units 09/12/20  0923 09/12/20  0605 09/11/20  2256   TROPONIN I ng/mL 0 02 0 02 0 04       CBC with diff:   Results from last 7 days   Lab Units 09/13/20  0446 09/12/20  0605 09/11/20  2257   WBC Thousand/uL 9 08 9 49 8 58   HEMOGLOBIN g/dL 10 8* 11 9* 12 1   HEMATOCRIT % 33 9* 36 9 38 0   MCV fL 94 94 95   PLATELETS Thousands/uL 140* 153 177   MCH pg 30 0 30 4 30 1   MCHC g/dL 31 9 32 2 31 8   RDW % 15 1 15 0 15 1   MPV fL 10 1 9 7 9 6   NRBC AUTO /100 WBCs  --  0 0       CMP:   Results from last 7 days   Lab Units 20  0446 20  0605 20  2256   SODIUM mmol/L 141 140 140   POTASSIUM mmol/L 3 5 4 0 5 0   CHLORIDE mmol/L 105 104 102   CO2 mmol/L 27 26 30   ANION GAP mmol/L 9 10 8   BUN mg/dL 35* 33* 31*   CREATININE mg/dL 1 54* 1 76* 2 00*   CALCIUM mg/dL 8 6 8 8 9 2   AST U/L  --   --  48*   ALT U/L  --   --  37   ALK PHOS U/L  --   --  64   TOTAL PROTEIN g/dL  --   --  7 7   ALBUMIN g/dL  --   --  2 9*   TOTAL BILIRUBIN mg/dL  --   --  0 60   EGFR ml/min/1 73sq m 42 36 31   GLUCOSE RANDOM mg/dL 117 149* 131       Magnesium:   Results from last 7 days   Lab Units 20  0446   MAGNESIUM mg/dL 2 3     Lipid Profile:   Lab Results   Component Value Date    CHOLESTEROL 123 2019    HDL 38 (L) 2019    LDLCALC 62 2019    TRIG 117 2019     NT-proBNP:   Recent Labs     20  2256   NTBNP 1,892*        Imaging & Testing   Cardiac testing:   Results for orders placed during the hospital encounter of 19   Echo complete with contrast if indicated    [address]  [address] [address]  [phone]    Transthoracic Echocardiogram  2D, M-mode, Doppler, and Color Doppler    Study date:  2019    Patient: Pennie Elise  MR number: [id number]  Account number: [de-identified]  : 1940  Age: 66 years  Gender: Male  Status: Inpatient  Location: Bedside  Height: 73 in  Weight: 384 1 lb  BP: 132/ 73 mmHg    Indications: Heart Failure    Diagnoses: I50 9 - Heart failure, unspecified    Sonographer:  ROGER Garay  Primary Physician:  Lois Conn  Referring Physician:  NAVA Guzman  Group:  [facility]  Interpreting Physician:  Emiliano Valdivia MD    SUMMARY    PROCEDURE INFORMATION:  This was a technically difficult study  Echocardiographic views were limited due to restricted patient mobility, poor acoustic window availability, and decreased penetration  LEFT VENTRICLE:  Size was at the upper limits of normal   Systolic function was at the lower limits of normal  Ejection fraction was estimated in the range of 50 % to 55 % to be 50 %  Although no diagnostic regional wall motion abnormality was identified, this possibility cannot be completely excluded on the basis of this study  Wall thickness was mildly to moderately increased  There was mild to moderate concentric hypertrophy  Doppler parameters were consistent with high ventricular filling pressure  RIGHT VENTRICLE:  The ventricle was mildly dilated  LEFT ATRIUM:  The atrium was moderately dilated  RIGHT ATRIUM:  The atrium was mildly dilated  MITRAL VALVE:  There was mild regurgitation  AORTIC VALVE:  The valve was trileaflet  Leaflets exhibited mildly to moderately increased thickness, mild to moderate calcification, mildly reduced cuspal separation, and reduced mobility  There was mild to moderate stenosis by 2d  Limited echo  Peak velocity 3 m/sec, with peak gradient of 36 mm of hg  There was trace regurgitation  TRICUSPID VALVE:  There was trace regurgitation  The tricuspid jet envelope definition was inadequate for estimation of RV systolic pressure  HISTORY: PRIOR HISTORY: HTN, SA on CPAP, Pacemaker, CHF, Gout, A-Flutter, CAD    PROCEDURE: The procedure was performed at the bedside  This was a routine study  The transthoracic approach was used  The study included complete 2D imaging, M-mode, complete spectral Doppler, and color Doppler  The heart rate was 160 bpm,  at the start of the study  Intravenous contrast (Definity solution [1 3 ml Definity/8 7ml normal saline solution], 1 ml) was administered to opacify the left ventricle   Echocardiographic views were limited due to restricted patient  mobility, poor acoustic window availability, and decreased penetration  This was a technically difficult study  LEFT VENTRICLE: Size was at the upper limits of normal  Systolic function was at the lower limits of normal  Ejection fraction was estimated in the range of 50 % to 55 % to be 50 %  Although no diagnostic regional wall motion abnormality  was identified, this possibility cannot be completely excluded on the basis of this study  Wall thickness was mildly to moderately increased  There was mild to moderate concentric hypertrophy  DOPPLER: The study was not technically  sufficient to allow evaluation of LV diastolic function  Doppler parameters were consistent with high ventricular filling pressure  RIGHT VENTRICLE: The ventricle was mildly dilated  Systolic function was low normal  Not well visualized  LEFT ATRIUM: The atrium was moderately dilated  RIGHT ATRIUM: The atrium was mildly dilated  MITRAL VALVE: DOPPLER: The transmitral velocity was within the normal range  There was no evidence for stenosis  There was mild regurgitation  AORTIC VALVE: The valve was trileaflet  Leaflets exhibited mildly to moderately increased thickness, mild to moderate calcification, mildly reduced cuspal separation, and reduced mobility  DOPPLER: There was mild to moderate stenosis by  2d  Limited echo  Peak velocity 3 m/sec, with peak gradient of 36 mm of hg There was trace regurgitation  TRICUSPID VALVE: DOPPLER: There was trace regurgitation  The tricuspid jet envelope definition was inadequate for estimation of RV systolic pressure  PULMONIC VALVE: Not well visualized  PERICARDIUM: There was no thickening or calcification  There was no pericardial effusion  AORTA: The root exhibited normal size  SYSTEMIC VEINS: HEPATIC VEINS: The hepatic veins were not well visualized      SYSTEM MEASUREMENT TABLES    2D mode  AoR Diam 2D: 3 7 cm  LA Diam (2D): 5 1 cm  LA/Ao (2D): 1 38  FS (2D Teich): 23 %  IVSd (2D): 1 35 cm  LVDEV: 156 cmï¾³  LVESV: 84 9 cmï¾³  LVIDd(2D): 5 64 cm  LVISd (2D): 4 34 cm  LVOT Area 2D: 3 46 cmï¾²  LVPWd (2D): 1 3 cm  SV (Teich): 71 1 cmï¾³    Unspecified Scan Mode  LVOT Diam : 2 1 cm  MV Peak E Vitaly  Mean: 1380 mm/s  MVA (PHT): 3 79 cmï¾²  PHT: 58 ms    [address] Accredited Echocardiography Laboratory    Prepared and electronically signed by    Jose Field MD  Signed 10-Apr-2019 13:56:32         Imaging: I have personally reviewed pertinent reports  Xr Chest 1 View Portable    Result Date: 9/11/2020  Narrative: CHEST INDICATION:   sob  COMPARISON:  8/28/2020 EXAM PERFORMED/VIEWS:  XR CHEST PORTABLE FINDINGS:  Left-sided chest wall intracardiac device is identified  Leads are intact  Cardiomediastinal silhouette appears unremarkable  Increased hazy airspace disease throughout the right middle and lower lung fields  No pneumothorax or pleural effusion  Osseous structures appear within normal limits for patient age  Impression: Increased hazy airspace disease throughout the right middle and lower lung fields consistent with pneumonia or asymmetric pulmonary edema Workstation performed: [id number]     Xr Chest Pa & Lateral    Result Date: 8/28/2020  Narrative: CHEST INDICATION:   J18 9: Pneumonia, unspecified organism  COMPARISON:  Chest radiograph from 10/18/2019  Abdomen CT from 10/28/2019  Chest CT from 8/26/2019  EXAM PERFORMED/VIEWS:  XR CHEST PA & LATERAL FINDINGS: Cardiomediastinal silhouette appears unremarkable  Left subclavian pacemaker leads in right atrium and right ventricle  The lungs are clear  No pneumothorax or pleural effusion  Osseous structures appear within normal limits for patient age  Impression: No acute cardiopulmonary disease  Workstation performed: [id number]     Ct Chest Wo Contrast    Result Date: 9/12/2020  Narrative: CT CHEST WITHOUT IV CONTRAST INDICATION:   Pneumonia  COMPARISON:  CT chest 8/26/2019   TECHNIQUE: CT examination of the chest was performed without intravenous contrast   Axial, sagittal, and coronal 2D reformatted images were created from the source data and submitted for interpretation  Radiation dose length product (DLP) for this visit:  1407 75 mGy-cm   This examination, like all CT scans performed in the [facility], was performed utilizing techniques to minimize radiation dose exposure, including the use of iterative reconstruction and automated exposure control  FINDINGS: LUNGS:  Extensive groundglass and consolidative airspace disease throughout the right lung  Faint groundglass opacity seen throughout the left lung as well  Diffuse interlobar septal and peribronchovascular thickening     There is no tracheal or endobronchial lesion  PLEURA:  Unremarkable  HEART/GREAT VESSELS:  Atherosclerotic changes are noted in thoracic aorta and coronary arteries  MEDIASTINUM AND DEBRA:  Right hilar and mediastinal adenopathy  For instance a right hilar lymph node measures 1 6 cm (series 2, image 37) and right paratracheal lymph node measures 1 8 cm (series 2, 24)  Findings are likely reactive  CHEST WALL AND LOWER NECK:   Unremarkable  VISUALIZED STRUCTURES IN THE UPPER ABDOMEN:  Unremarkable  OSSEOUS STRUCTURES:  No acute fracture or destructive osseous lesion  Impression: Findings consistent with pneumonia mostly throughout the right lung  Mild CHF  Workstation performed: [id number]     EKG/ Monitor: Personally reviewed  Paced rhythm with underlying atrial fibrillation  Code Status: Level 1 - Full Code  Advance Directive and Living Will: Yes    POLST:        Miguel Schofield MD MD, [facility]      "This note has been constructed using a voice recognition system  Therefore there may be syntax, spelling, and/or grammatical errors   Please call if you have any questions  "

## 2020-09-13 NOTE — PHYSICAL THERAPY NOTE
PT EVALUATION     09/13/20 1600   Pain Assessment   Pain Assessment Tool Pain Assessment not indicated - pt denies pain   Home Living   Type of [address] [facility] Two level; Able to live on main level with bedroom/bathroom;Stairs to enter with rails  (2 stairs to enter)   [address] [facility],[address]   Additional Comments patient not using assistive device prior to admission although states "being off balance at times", Education completed for use of cane possibly for energy conservation   Prior Function   Lives With Spouse   Receives Help From Family   ADL Assistance Independent   IADLs Independent   Comments family assists at times as needed, patient reports driving and eating out with friends frequently   Restrictions/Precautions   Other Precautions Fall Risk  (SOB with exertion)   General   Additional Pertinent History chart reviewed, patient admitted with SOB, pneumonia, hypoxia  Family/Caregiver Present Yes  (wife present)   Cognition   Overall Cognitive Status WFL   Arousal/Participation Cooperative   Orientation Level Oriented X4   Following Commands Follows all commands and directions without difficulty   RLE Assessment   RLE Assessment   (ROM WFL, strength 3+/4-)   LLE Assessment   LLE Assessment   (ROM WFL, strength 3+/4-)   Coordination   Movements are Fluid and Coordinated 0   Bed Mobility   Supine to Sit 7  Independent   Transfers   Sit to Stand 5  Supervision   Stand to Sit 5  Supervision   Ambulation/Elevation   Gait Assistance   (supervision to min assist at times)   Additional items Assist x 1;Verbal cues; Tactile cues   Assistive Device   (none)   Distance 40 feet in room with numerous changes in direction, standing rest periods due to SOB and requiring recovery with reaching for furniture at times   Balance   Static Sitting Fair +   Dynamic Sitting Fair   Static Standing Fair   Dynamic Standing Fair   Ambulatory Fair   Activity Tolerance   Activity Tolerance Patient tolerated treatment well;Patient limited by fatigue   Nurse Made Aware yes   Assessment   Prognosis Good   Problem List Decreased strength;Decreased range of motion;Decreased endurance; Impaired balance;Decreased mobility; Decreased coordination   Assessment Patient seen for Physical Therapy evaluation  Patient admitted with pneumonia, SOB  Comorbidities affecting patient's physical performance include: COPD, obesity, CHF, afib, LBP, chronic pain, CAD  Personal factors affecting patient at time of initial evaluation include: stairs to enter home, inability to navigate community distances, inability to navigate level surfaces without external assistance and positive fall history  Prior to admission, patient was independent with functional mobility without assistive device and independent with ADLS  Please find objective findings from Physical Therapy assessment regarding body systems outlined above with impairments and limitations including weakness, impaired balance, decreased endurance, gait deviations, pain, decreased activity tolerance, decreased functional mobility tolerance, fall risk and SOB upon exertion  The Barthel Index was used as a functional outcome tool presenting with a score of 65 today indicating marked limitations of functional mobility and ADLS  Patient's clinical presentation is currently unstable/unpredictable as seen in patient's presentation of vital sign response, increased fall risk, new onset of impairment of functional mobility, decreased endurance and new onset of weakness  Pt would benefit from continued Physical Therapy treatment to address deficits as defined above and maximize level of functional mobility  As demonstrated by objective findings, the assigned level of complexity for this evaluation is high     Goals   Patient Goals feel better and go home   STG Expiration Date 09/20/20   Short Term Goal #1 transfers and gait independently without assistive device   Short Term Goal #2 gait endurance to functional household distances, strength BLes 4/5 all to meet patient goal of returning home   LTG Expiration Date 09/27/20   Long Term Goal #1 gait endurance to functional community distances    Long Term Goal #2 strength BLEs 4/5   Plan   Treatment/Interventions ADL retraining;Functional transfer training;LE strengthening/ROM; Elevations; Therapeutic exercise; Endurance training;Patient/family training;Equipment eval/education; Bed mobility;Gait training; Compensatory technique education   PT Frequency 5x/wk   Recommendation   PT Discharge Recommendation Return to previous environment with social support   Barthel Index   Feeding 10   Bathing 0   Grooming Score 0   Dressing Score 5   Bladder Score 10   Bowels Score 10   Toilet Use Score 10   Transfers (Bed/Chair) Score 15   Mobility (Level Surface) Score 0   Stairs Score 5   Barthel Index Score 72   Licensure   [place] License Number  Jelani Elvi PT [id number]

## 2020-09-13 NOTE — PLAN OF CARE
Problem: Potential for Falls  Goal: Patient will remain free of falls  Description: INTERVENTIONS:  - Assess patient frequently for physical needs  -  Identify cognitive and physical deficits and behaviors that affect risk of falls    -  [place] fall precautions as indicated by assessment   - Educate patient/family on patient safety including physical limitations  - Instruct patient to call for assistance with activity based on assessment  - Modify environment to reduce risk of injury  - Consider OT/PT consult to assist with strengthening/mobility  Outcome: Progressing     Problem: PAIN - ADULT  Goal: Verbalizes/displays adequate comfort level or baseline comfort level  Description: Interventions:  - Encourage patient to monitor pain and request assistance  - Assess pain using appropriate pain scale  - Administer analgesics based on type and severity of pain and evaluate response  - Implement non-pharmacological measures as appropriate and evaluate response  - Consider cultural and social influences on pain and pain management  - Notify physician/advanced practitioner if interventions unsuccessful or patient reports new pain  Outcome: Progressing     Problem: INFECTION - ADULT  Goal: Absence or prevention of progression during hospitalization  Description: INTERVENTIONS:  - Assess and monitor for signs and symptoms of infection  - Monitor lab/diagnostic results  - Monitor all insertion sites, i e  indwelling lines, tubes, and drains  - Monitor endotracheal if appropriate and nasal secretions for changes in amount and color  - [place] appropriate cooling/warming therapies per order  - Administer medications as ordered  - Instruct and encourage patient and family to use good hand hygiene technique  - Identify and instruct in appropriate isolation precautions for identified infection/condition  Outcome: Progressing     Problem: SAFETY ADULT  Goal: Patient will remain free of falls  Description: INTERVENTIONS:  - Assess patient frequently for physical needs  -  Identify cognitive and physical deficits and behaviors that affect risk of falls  -  [place] fall precautions as indicated by assessment   - Educate patient/family on patient safety including physical limitations  - Instruct patient to call for assistance with activity based on assessment  - Modify environment to reduce risk of injury  - Consider OT/PT consult to assist with strengthening/mobility  Outcome: Progressing  Goal: Maintain or return to baseline ADL function  Description: INTERVENTIONS:  - Assess patient frequently for physical needs  -  Identify cognitive and physical deficits and behaviors that affect risk of falls    -  [facility] fall precautions as indicated by assessment   - Educate patient/family on patient safety including physical limitations  - Instruct patient to call for assistance with activity based on assessment  - Modify environment to reduce risk of injury  - Consider OT/PT consult to assist with strengthening/mobility  Outcome: Progressing  Goal: Maintain or return mobility status to optimal level  Description: INTERVENTIONS:  -  Assess patient's ability to carry out ADLs; assess patient's baseline for ADL function and identify physical deficits which impact ability to perform ADLs (bathing, care of mouth/teeth, toileting, grooming, dressing, etc )  - Assess/evaluate cause of self-care deficits   - Assess range of motion  - Assess patient's mobility; develop plan if impaired  - Assess patient's need for assistive devices and provide as appropriate  - Encourage maximum independence but intervene and supervise when necessary  - Involve family in performance of ADLs  - Assess for home care needs following discharge   - Consider OT consult to assist with ADL evaluation and planning for discharge  - Provide patient education as appropriate  Outcome: Progressing     Problem: DISCHARGE PLANNING  Goal: Discharge to home or other facility with appropriate resources  Description: INTERVENTIONS:  - Identify barriers to discharge w/patient and caregiver  - Arrange for needed discharge resources and transportation as appropriate  - Identify discharge learning needs (meds, wound care, etc )  - Arrange for interpretive services to assist at discharge as needed  - Refer to Case Management Department for coordinating discharge planning if the patient needs post-hospital services based on physician/advanced practitioner order or complex needs related to functional status, cognitive ability, or social support system  Outcome: Progressing     Problem: Knowledge Deficit  Goal: Patient/family/caregiver demonstrates understanding of disease process, treatment plan, medications, and discharge instructions  Description: Complete learning assessment and assess knowledge base    Interventions:  - Provide teaching at level of understanding  - Provide teaching via preferred learning methods  Outcome: Progressing     Problem: RESPIRATORY - ADULT  Goal: Achieves optimal ventilation and oxygenation  Description: INTERVENTIONS:  - Assess for changes in respiratory status  - Assess for changes in mentation and behavior  - Position to facilitate oxygenation and minimize respiratory effort  - Oxygen administered by appropriate delivery if ordered  - Initiate smoking cessation education as indicated  - Encourage broncho-pulmonary hygiene including cough, deep breathe, Incentive Spirometry  - Assess the need for suctioning and aspirate as needed  - Assess and instruct to report SOB or any respiratory difficulty  - Respiratory Therapy support as indicated  Outcome: Progressing

## 2020-09-14 ENCOUNTER — APPOINTMENT (INPATIENT)
Dept: NON INVASIVE DIAGNOSTICS | Facility: HOSPITAL | Age: 80
DRG: 291 | End: 2020-09-14
Payer: MEDICARE

## 2020-09-14 ENCOUNTER — APPOINTMENT (INPATIENT)
Dept: RADIOLOGY | Facility: HOSPITAL | Age: 80
DRG: 291 | End: 2020-09-14
Payer: MEDICARE

## 2020-09-14 LAB
ANION GAP SERPL CALCULATED.3IONS-SCNC: 10 MMOL/L (ref 4–13)
BACTERIA UR CULT: NORMAL
BASOPHILS # BLD AUTO: 0.09 THOUSANDS/ΜL (ref 0–0.1)
BASOPHILS NFR BLD AUTO: 1 % (ref 0–1)
BUN SERPL-MCNC: 30 MG/DL (ref 5–25)
CALCIUM SERPL-MCNC: 8.4 MG/DL (ref 8.3–10.1)
CHLORIDE SERPL-SCNC: 105 MMOL/L (ref 100–108)
CO2 SERPL-SCNC: 26 MMOL/L (ref 21–32)
CREAT SERPL-MCNC: 1.34 MG/DL (ref 0.6–1.3)
EOSINOPHIL # BLD AUTO: 0.41 THOUSAND/ΜL (ref 0–0.61)
EOSINOPHIL NFR BLD AUTO: 5 % (ref 0–6)
ERYTHROCYTE [DISTWIDTH] IN BLOOD BY AUTOMATED COUNT: 15.4 % (ref 11.6–15.1)
GFR SERPL CREATININE-BSD FRML MDRD: 50 ML/MIN/1.73SQ M
GLUCOSE SERPL-MCNC: 111 MG/DL (ref 65–140)
HCT VFR BLD AUTO: 34.8 % (ref 36.5–49.3)
HGB BLD-MCNC: 10.8 G/DL (ref 12–17)
IMM GRANULOCYTES # BLD AUTO: 0.07 THOUSAND/UL (ref 0–0.2)
IMM GRANULOCYTES NFR BLD AUTO: 1 % (ref 0–2)
LYMPHOCYTES # BLD AUTO: 1.33 THOUSANDS/ΜL (ref 0.6–4.47)
LYMPHOCYTES NFR BLD AUTO: 16 % (ref 14–44)
MCH RBC QN AUTO: 29.9 PG (ref 26.8–34.3)
MCHC RBC AUTO-ENTMCNC: 31 G/DL (ref 31.4–37.4)
MCV RBC AUTO: 96 FL (ref 82–98)
MONOCYTES # BLD AUTO: 0.63 THOUSAND/ΜL (ref 0.17–1.22)
MONOCYTES NFR BLD AUTO: 8 % (ref 4–12)
NEUTROPHILS # BLD AUTO: 5.59 THOUSANDS/ΜL (ref 1.85–7.62)
NEUTS SEG NFR BLD AUTO: 69 % (ref 43–75)
NRBC BLD AUTO-RTO: 0 /100 WBCS
PLATELET # BLD AUTO: 146 THOUSANDS/UL (ref 149–390)
PMV BLD AUTO: 10 FL (ref 8.9–12.7)
POTASSIUM SERPL-SCNC: 4 MMOL/L (ref 3.5–5.3)
PROCALCITONIN SERPL-MCNC: 0.33 NG/ML
RBC # BLD AUTO: 3.61 MILLION/UL (ref 3.88–5.62)
SODIUM SERPL-SCNC: 141 MMOL/L (ref 136–145)
WBC # BLD AUTO: 8.12 THOUSAND/UL (ref 4.31–10.16)

## 2020-09-14 PROCEDURE — 99223 1ST HOSP IP/OBS HIGH 75: CPT | Performed by: INTERNAL MEDICINE

## 2020-09-14 PROCEDURE — 94664 DEMO&/EVAL PT USE INHALER: CPT

## 2020-09-14 PROCEDURE — 94640 AIRWAY INHALATION TREATMENT: CPT

## 2020-09-14 PROCEDURE — 85025 COMPLETE CBC W/AUTO DIFF WBC: CPT | Performed by: INTERNAL MEDICINE

## 2020-09-14 PROCEDURE — 93306 TTE W/DOPPLER COMPLETE: CPT | Performed by: INTERNAL MEDICINE

## 2020-09-14 PROCEDURE — 74176 CT ABD & PELVIS W/O CONTRAST: CPT

## 2020-09-14 PROCEDURE — 94760 N-INVAS EAR/PLS OXIMETRY 1: CPT

## 2020-09-14 PROCEDURE — 80048 BASIC METABOLIC PNL TOTAL CA: CPT | Performed by: INTERNAL MEDICINE

## 2020-09-14 PROCEDURE — C8929 TTE W OR WO FOL WCON,DOPPLER: HCPCS

## 2020-09-14 PROCEDURE — 87070 CULTURE OTHR SPECIMN AEROBIC: CPT | Performed by: INTERNAL MEDICINE

## 2020-09-14 PROCEDURE — 94660 CPAP INITIATION&MGMT: CPT

## 2020-09-14 PROCEDURE — 87205 SMEAR GRAM STAIN: CPT | Performed by: INTERNAL MEDICINE

## 2020-09-14 PROCEDURE — 99232 SBSQ HOSP IP/OBS MODERATE 35: CPT | Performed by: INTERNAL MEDICINE

## 2020-09-14 PROCEDURE — 84145 PROCALCITONIN (PCT): CPT | Performed by: INTERNAL MEDICINE

## 2020-09-14 PROCEDURE — G1004 CDSM NDSC: HCPCS

## 2020-09-14 RX ORDER — DOCUSATE SODIUM 100 MG/1
100 CAPSULE, LIQUID FILLED ORAL 2 TIMES DAILY
Status: DISCONTINUED | OUTPATIENT
Start: 2020-09-14 | End: 2020-09-15 | Stop reason: HOSPADM

## 2020-09-14 RX ORDER — POLYETHYLENE GLYCOL 3350 17 G/17G
17 POWDER, FOR SOLUTION ORAL DAILY PRN
Status: DISCONTINUED | OUTPATIENT
Start: 2020-09-14 | End: 2020-09-15 | Stop reason: HOSPADM

## 2020-09-14 RX ORDER — FUROSEMIDE 20 MG/1
20 TABLET ORAL
Status: DISCONTINUED | OUTPATIENT
Start: 2020-09-14 | End: 2020-09-15 | Stop reason: HOSPADM

## 2020-09-14 RX ORDER — FUROSEMIDE 40 MG/1
40 TABLET ORAL DAILY
Status: DISCONTINUED | OUTPATIENT
Start: 2020-09-15 | End: 2020-09-15 | Stop reason: HOSPADM

## 2020-09-14 RX ADMIN — ALLOPURINOL 300 MG: 300 TABLET ORAL at 08:39

## 2020-09-14 RX ADMIN — FELODIPINE 5 MG: 2.5 TABLET, FILM COATED, EXTENDED RELEASE ORAL at 08:39

## 2020-09-14 RX ADMIN — IPRATROPIUM BROMIDE AND ALBUTEROL SULFATE 3 ML: 2.5; .5 SOLUTION RESPIRATORY (INHALATION) at 19:23

## 2020-09-14 RX ADMIN — VITAM B12 100 MCG: 100 TAB at 08:39

## 2020-09-14 RX ADMIN — FUROSEMIDE 40 MG: 10 INJECTION, SOLUTION INTRAMUSCULAR; INTRAVENOUS at 08:39

## 2020-09-14 RX ADMIN — CEFEPIME HYDROCHLORIDE 2000 MG: 2 INJECTION, POWDER, FOR SOLUTION INTRAVENOUS at 15:46

## 2020-09-14 RX ADMIN — OYSTER SHELL CALCIUM WITH VITAMIN D 2 TABLET: 500; 200 TABLET, FILM COATED ORAL at 08:39

## 2020-09-14 RX ADMIN — DOCUSATE SODIUM 100 MG: 100 CAPSULE, LIQUID FILLED ORAL at 18:32

## 2020-09-14 RX ADMIN — IPRATROPIUM BROMIDE AND ALBUTEROL SULFATE 3 ML: 2.5; .5 SOLUTION RESPIRATORY (INHALATION) at 02:33

## 2020-09-14 RX ADMIN — OXYCODONE HYDROCHLORIDE AND ACETAMINOPHEN 500 MG: 500 TABLET ORAL at 08:39

## 2020-09-14 RX ADMIN — PAROXETINE HYDROCHLORIDE 10 MG: 20 TABLET, FILM COATED ORAL at 08:39

## 2020-09-14 RX ADMIN — PERFLUTREN 0.4 ML/MIN: 6.52 INJECTION, SUSPENSION INTRAVENOUS at 12:29

## 2020-09-14 RX ADMIN — FINASTERIDE 5 MG: 5 TABLET, FILM COATED ORAL at 08:39

## 2020-09-14 RX ADMIN — Medication 1000 MG: at 17:37

## 2020-09-14 RX ADMIN — FLUTICASONE FUROATE AND VILANTEROL TRIFENATATE 1 PUFF: 100; 25 POWDER RESPIRATORY (INHALATION) at 08:46

## 2020-09-14 RX ADMIN — CLOPIDOGREL BISULFATE 75 MG: 75 TABLET ORAL at 08:39

## 2020-09-14 RX ADMIN — FUROSEMIDE 20 MG: 20 TABLET ORAL at 15:45

## 2020-09-14 RX ADMIN — Medication 1000 MG: at 08:39

## 2020-09-14 RX ADMIN — PRAVASTATIN SODIUM 40 MG: 40 TABLET ORAL at 15:44

## 2020-09-14 RX ADMIN — IPRATROPIUM BROMIDE AND ALBUTEROL SULFATE 3 ML: 2.5; .5 SOLUTION RESPIRATORY (INHALATION) at 14:06

## 2020-09-14 RX ADMIN — RIVAROXABAN 20 MG: 10 TABLET, FILM COATED ORAL at 15:44

## 2020-09-14 RX ADMIN — IOHEXOL 50 ML: 240 INJECTION, SOLUTION INTRATHECAL; INTRAVASCULAR; INTRAVENOUS; ORAL at 20:13

## 2020-09-14 RX ADMIN — Medication 6 MG: at 22:34

## 2020-09-14 RX ADMIN — IPRATROPIUM BROMIDE AND ALBUTEROL SULFATE 3 ML: 2.5; .5 SOLUTION RESPIRATORY (INHALATION) at 07:34

## 2020-09-14 NOTE — CONSULTS
Consultation - Infectious Disease   Randal Lanier [de-identified] y o  male MRN: [id number]  Unit/Bed#: [address] [place] [address] Encounter: [id number]      IMPRESSION & RECOMMENDATIONS:   1  Gram-negative bacteremia-with only 1 of 2 sets positive  Consideration for the possibility of a contaminant as the patient had no fever or leukocytosis and has a low procalcitonin level  Consideration for the possibility of transient bacteremia associated with the patient constipation  Consideration for the possibility of another source such as a pulmonary source in this patient with shortness of breath  Fortunately the patient remains hemodynamically stable and nontoxic despite his systemic illness   -discontinue ceftriaxone  -start cefepime 2 g IV q 12 hours hours awaiting additional data  -followup sensitivities and adjust antibiotics as needed  -follow up repeat blood cultures  -recheck procalcitonin  -check CT the abdomen pelvis with oral contrast only  -additional workup as needed    2  Acute hypoxic respiratory failure-suspect secondary to acute on chronic diastolic heart failure with COPD possibly playing a role  Consideration for the possibility of pneumonia although the clinical presentation is less consistent with this with rapid resolution with treatment for the heart failure  No longer requiring any oxygen support  -diuresis for congestive heart failure  -antibiotics as above  -monitor respiratory status  -supportive care    3  Chronic kidney disease-stage III  Renal function improved since admission with volume management  -continue volume management  -recheck BMP  -dose adjust antibiotics as needed    4  Chronic atrial fibrillation-status post pacemaker placement and on rate control and anticoagulation    5   COPD-no current clinical evidence of an acute exacerbation at this time     Have discussed the above management plan in detail with the primary service    Extensive review of the medical records in epic including review of the notes, radiographs, and laboratory results     HISTORY OF PRESENT ILLNESS:  Reason for Consult:  Gram-negative bacteremia  HPI: Kathryn Britt is a [de-identified]y o  year old male with chronic atrial fibrillation status post permanent pacemaker, coronary artery disease with diastolic dysfunction, and morbid obesity, admitted [facility] with increased shortness of breath who I am asked to assist with management of positive blood cultures  The patient has gotten multiple medical problems however as an outpatient was relatively stable until recently  Apparently over the last year he has lost almost 100 lb with improvement in his overall medical status  According to his daughter the patient recently became quite collected this with eating and drinking including a large seafood meal, and a large drink of repair  He developed worsening dyspnea on exertion that he was apparently started on azithromycin for possible respiratory infection  His symptoms worsened and therefore he came to the ER for further evaluation  CT the chest suggested asymmetric pulmonary edema and the patient had increased BNP  Patient was given IV Lasix with clinical improvement  Because of some question of having pneumonia, he also had blood cultures obtained and was started on ceftriaxone and azithromycin admitted for further management  Overall the patient has significantly improved with resolution of the shortness of breath and not requiring any oxygen support  He has remained afebrile during his entire hospital stay and has not had a leukocytosis  He admits to having struggles with constipation for quite some time but denies any nausea vomiting or diarrhea or abdominal pain  Denies any dysuria or hematuria  He is very anxious to get home and he is feeling much better overall    REVIEW OF SYSTEMS:  A complete review of systems is negative other than that noted in the HPI      PAST MEDICAL HISTORY:  Past Medical History:   Diagnosis Date    Arthritis     Atrial flutter ([facility] [address] )     Chronic kidney disease     stage 3    Coronary artery disease     2 stents    Fluid retention     Gout     Heart failure (HCC)     pacemaker    Hypertension     Pacemaker     Pulmonary emphysema ([facility] [address] )     Radiculopathy     last assessed 16     Shortness of breath     exertion    Sleep apnea     c pap     Past Surgical History:   Procedure Laterality Date    ANGIOPLASTY      x2 2 stents and then replaced    CARDIAC PACEMAKER PLACEMENT      pacemaker permanent placement dual chamber / last assessed 14 / implantation     CARDIAC SURGERY      pacemaker    CHOLECYSTECTOMY      CORONARY ANGIOPLASTY WITH STENT PLACEMENT      EPIDURAL BLOCK INJECTION N/A 2016    Procedure: BLOCK / INJECTION EPIDURAL STEROID LUMBAR  L4-5;  Surgeon: Adriana Herbert MD;  Location[facility] MAIN OR;  Service:     EPIDURAL BLOCK INJECTION N/A 2019    Procedure: L4 L5 Lumbar Epidural Steroid Injection;  Surgeon: Adriana Herbert MD;  Location[facility] MAIN OR;  Service: Pain Management     EYE SURGERY      cataract left    KNEE ARTHROSCOPY W/ MENISCAL REPAIR Left     LUMBAR EPIDURAL INJECTION N/A 3/17/2016    Procedure: BLOCK / INJECTION LUMBAR  L4-5  (C-ARM);   Surgeon: Adriana Herbert MD;  Location: [facility] MAIN OR;  Service:        FAMILY HISTORY:  Non-contributory    SOCIAL HISTORY:  Social History   Social History     Substance and Sexual Activity   Alcohol Use Never    Frequency: Never     Social History     Substance and Sexual Activity   Drug Use No     Social History     Tobacco Use   Smoking Status Former Smoker    Packs/day: 1 00    Years: 50 00    Pack years: 50 00    Types: Cigarettes    Last attempt to quit: 3/27/2019    Years since quittin 4   Smokeless Tobacco Never Used   Tobacco Comment    quit 2 weeks ago       ALLERGIES:  No Known Allergies    MEDICATIONS:  All current active medications have been reviewed  Antibiotics:  Ceftriaxone 4  PHYSICAL EXAM:  Temp:  [97 5 °F (36 4 °C)-98 1 °F (36 7 °C)] 97 5 °F (36 4 °C)  HR:  [66-71] 70  Resp:  [18-28] 18  BP: ()/(50-68) 120/68  SpO2:  [93 %-100 %] 97 %  Temp (24hrs), Av 8 °F (36 6 °C), Min:97 5 °F (36 4 °C), Max:98 1 °F (36 7 °C)  Current: Temperature: 97 5 °F (36 4 °C)    Intake/Output Summary (Last 24 hours) at 2020 1435  Last data filed at 2020 1338  Gross per 24 hour   Intake 480 ml   Output 2525 ml   Net -2045 ml       General Appearance:  Appearing well, nontoxic, and in no distress   Head:  Normocephalic, without obvious abnormality, atraumatic   Eyes:  Conjunctiva pink and sclera anicteric, both eyes   Nose: Nares normal, mucosa normal, no drainage   Throat: Oropharynx moist without lesions   Neck: Supple, symmetrical, no adenopathy, no tenderness/mass/nodules   Back:   Symmetric, no curvature, ROM normal, no CVA tenderness   Lungs:   Clear to auscultation bilaterally, respirations unlabored   Chest Wall:  No tenderness or deformity   Heart:  Irregular; no murmur, rub or gallop   Abdomen:   Soft, mildly distended, nontender, positive bowel sounds    Extremities: No cyanosis, clubbing or edema   Skin: No rashes or lesions  No draining wounds noted  Lymph nodes: Cervical, supraclavicular nodes normal   Neurologic: Alert and oriented times 3, extremity strength 5/5 and symmetric       LABS, IMAGING, & OTHER STUDIES:  Lab Results:  I have personally reviewed pertinent labs    Results from last 7 days   Lab Units 20  0605   WBC Thousand/uL 8 12 9 08 9 49   HEMOGLOBIN g/dL 10 8* 10 8* 11 9*   PLATELETS Thousands/uL 146* 140* 153     Results from last 7 days   Lab Units 20  0628 20  0446 20  0605 20  2256   SODIUM mmol/L 141 141 140 140   POTASSIUM mmol/L 4 0 3 5 4 0 5 0   CHLORIDE mmol/L 105 105 104 102   CO2 mmol/L 26 27 26 30   BUN mg/dL 30* 35* 33* 31*   CREATININE mg/dL 1 34* 1 54* 1 76* 2 00*   EGFR ml/min/1 73sq m 50 42 36 31   CALCIUM mg/dL 8 4 8 6 8 8 9 2   AST U/L  --   --   --  48*   ALT U/L  --   --   --  37   ALK PHOS U/L  --   --   --  64     Results from last 7 days   Lab Units 09/13/20  1146 09/13/20  1051 09/12/20  0800 09/12/20  0410 09/11/20  2358   BLOOD CULTURE  Received in Microbiology Lab  Culture in Progress  Received in Microbiology Lab  Culture in Progress  --   --   --  No Growth at 48 hrs    Acinetobacter lwoffii*   GRAM STAIN RESULT   --   --   --   --  Gram negative rods*   URINE CULTURE   --  No Growth <1000 cfu/mL  --   --   --    MRSA CULTURE ONLY   --   --  No Methicillin Resistant Staphlyococcus aureus (MRSA) isolated  --   --    LEGIONELLA URINARY ANTIGEN   --   --   --  Negative  --      Results from last 7 days   Lab Units 09/14/20  0628 09/13/20  0446 09/12/20  0605   PROCALCITONIN ng/ml 0 33* 0 19 0 21                   Imaging Studies:     CT chest-ground glass opacification on the right greater than left consistent with asymmetric pulmonary edema    Images personally reviewed by me in PACS

## 2020-09-14 NOTE — PROGRESS NOTES
[facility] Internal Medicine Progress Note  Patient: Jeff Olivo [de-identified] y o  male   MRN: [id number]  PCP: Gordon Ding DO  Unit/Bed#: [address] [place] [address] Encounter: [id number]  Date Of Visit: 09/14/20    Problem List:    Principal Problem:    Acute respiratory failure with hypoxia ([address] )  Active Problems:    Acute on chronic diastolic congestive heart failure (HCC)    Gram-negative bacteremia    COPD (chronic obstructive pulmonary disease) ([facility])    CKD (chronic kidney disease) stage 3, GFR 30-59 ml/min ([facility])    CAD (coronary artery disease)    Pneumonia    Anemia    Chronic a-fib ([facility])    Chronic gout without tophus    JOVI (obstructive sleep apnea)      Assessment & Plan:    Gram-negative bacteremia  Assessment & Plan  1/2 blood culture noted to be positive for Gram-negative bacteria  Unclear source  Patient denies any  or GI symptoms except good urine output with diuretics  UA not suggestive of urinary tract infection  · Follow up repeat blood culture  · Check UA, urine culture  · Continue Rocephin for now  · Follow-up culture sensitivity  · Trend procalcitonin  · Check sputum culture  · Infectious disease evaluation, CT of the abdomen/pelvis    Acute on chronic diastolic congestive heart failure (HCC)  Assessment & Plan  Wt Readings from Last 3 Encounters:   09/13/20 136 kg (299 lb 2 6 oz)   08/26/20 (!) 141 kg (310 lb)   06/10/20 (!) 141 kg (310 lb)     Patient reports that he gained 4 lbs after going out to dinner followed by breakfast the next day  He had increased edema  He was short of breath with exertion evening  Clinical presentation consistent with CHF  Chest x-ray-air space disease on the right middle and lower lobe  CT scan diffuse infiltrate right more than left  Lymphadenopathy noted  Prior echocardiogram EF 55%,  Improved with IV Lasix  · telemetry-unremarkable  Discontinued  · Serial cardiac marker-negative  · Transition to p o   Diuretics today  · Daily weight, intake output  · Salt and fluid restriction, discussed with patient  · Cardiology evaluation appreciated    * Acute respiratory failure with hypoxia (HCC)  Assessment & Plan  Oxygen saturation in the low 80s at home, high 80s on arrival to the ER  Likely secondary to CHF  Improving after IV Lasix  Currently saturating adequately on room air  · Continue supplemental oxygen, taper of as tolerated    Pneumonia  Assessment & Plan  Patient was exerting himself this evening and developed shortness of breath  He denies any fevers, cough or chills  Denies sick on  Patient was treated with azithromycin last week for URI, negative CXR at that time  With clinical improvement  Pneumonia suspect on presentation based on radiographic unilateral finding  Patient is afebrile with normal white count  Doubt bacterial pneumonia    · Monitor clinically  · SARS-CoV-2 PCR-negative  · Urine Legionella pneumococcal antigen negative  · Continue rocephin as above   · Trend procalcitonin follow-up blood culture sensitivities  · Supportive therapies with duonebs, respiratory protocol  · Repeat imaging/CT scan as outpatient    CAD (coronary artery disease)  Assessment & Plan  History of PCI to the LADx2, RCA and circumflex,   Serial troponins-negative  Continue plavix, metoprolol, statin    CKD (chronic kidney disease) stage 3, GFR 30-59 ml/min ([facility])  Assessment & Plan  Baseline Cr 1 2-1 5, elevated at 2 0  Likely cardiorenal  Creatinine improved to baseline with diuresis  Monitor intake output  Follow-up BMP    COPD (chronic obstructive pulmonary disease) ([facility])  Assessment & Plan  No evidence of exacerbation  Continue racheloneanali and breo    Anemia  Assessment & Plan  Normocytic, hemoglobin decline from 12 1-10 8 during hospitalization  No overt bleeding  Monitor    JOVI (obstructive sleep apnea)  Assessment & Plan  Continue CPAP    Chronic gout without tophus  Assessment & Plan  continue allopurinol     Chronic a-fib ([facility])  Assessment & Plan  History of complete heart block post pacemaker  Continue metoprolol and xarelto        VTE Pharmacologic Prophylaxis:   Pharmacologic: Rivaroxaban (Xarelto)  Mechanical VTE Prophylaxis in Place: Yes    Patient Centered Rounds: I have performed bedside rounds with nursing staff today  Discussions with Specialists or Other Care Team Provider:  Yes, Infectious Disease, Cardiology    Education and Discussions with Family / Patient:  Daughter at bedside    Time Spent for Care: 45 minutes  More than 50% of total time spent on counseling and coordination of care as described above  Current Length of Stay: 3 day(s)    Current Patient Status: Inpatient   Certification Statement: The patient will continue to require additional inpatient hospital stay due to Respiratory failure, decompensated CHF    Discharge Plan:  Home in next 24-48 hours    Code Status: Level 1 - Full Code      Subjective:   Continues to feel well  Reports that his breathing is improved  Denies any shortness of breath chest pain dizziness palpitation  Ambulating without any limiting symptoms  Denies any  or GI symptoms except ongoing constipation history    Reports mild cough with sputum production    Objective:     Vitals:   Temp (24hrs), Av 8 °F (36 6 °C), Min:97 5 °F (36 4 °C), Max:98 1 °F (36 7 °C)    Temp:  [97 5 °F (36 4 °C)-98 1 °F (36 7 °C)] 97 5 °F (36 4 °C)  HR:  [66-71] 70  Resp:  [18-28] 18  BP: ()/(50-68) 120/68  SpO2:  [93 %-100 %] 97 % room air  Body mass index is 39 47 kg/m²  Input and Output Summary (last 24 hours): Intake/Output Summary (Last 24 hours) at 2020 1505  Last data filed at 2020 1338  Gross per 24 hour   Intake 480 ml   Output 2525 ml   Net -2045 ml       Physical Exam:     Physical Exam  Constitutional:       General: He is not in acute distress  Appearance: He is obese  HENT:      Head: Normocephalic and atraumatic  Eyes:      Pupils: Pupils are equal, round, and reactive to light     Neck: Musculoskeletal: Neck supple  Cardiovascular:      Rate and Rhythm: Normal rate  Heart sounds: Murmur present  Pulmonary:      Effort: Pulmonary effort is normal  No respiratory distress  Breath sounds: No wheezing, rhonchi or rales  Chest:      Chest wall: No tenderness  Abdominal:      General: Bowel sounds are normal  There is no distension  Palpations: Abdomen is soft  Tenderness: There is no abdominal tenderness  There is no guarding or rebound  Musculoskeletal:      Comments: Trace edema   Skin:     General: Skin is warm and dry  Findings: No rash  Neurological:      Mental Status: He is alert  Mental status is at baseline  Cranial Nerves: No cranial nerve deficit  Additional Data:     Labs:    Results from last 7 days   Lab Units 09/14/20  0628   WBC Thousand/uL 8 12   HEMOGLOBIN g/dL 10 8*   HEMATOCRIT % 34 8*   PLATELETS Thousands/uL 146*   NEUTROS PCT % 69   LYMPHS PCT % 16   MONOS PCT % 8   EOS PCT % 5     Results from last 7 days   Lab Units 09/14/20  0628  09/11/20  2256   POTASSIUM mmol/L 4 0   < > 5 0   CHLORIDE mmol/L 105   < > 102   CO2 mmol/L 26   < > 30   BUN mg/dL 30*   < > 31*   CREATININE mg/dL 1 34*   < > 2 00*   CALCIUM mg/dL 8 4   < > 9 2   ALK PHOS U/L  --   --  64   ALT U/L  --   --  37   AST U/L  --   --  48*    < > = values in this interval not displayed  * I Have Reviewed All Lab Data Listed Above  * Additional Pertinent Lab Tests Reviewed: All Labs Within Last 24 Hours Reviewed      Imaging:  Imaging Reports Reviewed Today Include:  Chest x-ray and CT scan  Recent Cultures (last 7 days):     Results from last 7 days   Lab Units 09/13/20  1146 09/13/20  1051 09/12/20  0410 09/11/20  2358   BLOOD CULTURE  Received in Microbiology Lab  Culture in Progress  Received in Microbiology Lab  Culture in Progress  --   --  No Growth at 48 hrs    Acinetobacter lwoffii*   GRAM STAIN RESULT   --   --   --  Gram negative rods*   URINE CULTURE   --  No Growth <1000 cfu/mL  --   --    LEGIONELLA URINARY ANTIGEN   --   --  Negative  --        Last 24 Hours Medication List:   Current Facility-Administered Medications   Medication Dose Route Frequency Provider Last Rate    allopurinol  300 mg Oral Daily Patricio Come, CRNP      ascorbic acid  500 mg Oral Daily Patricio Come, CRNP      calcium carbonate-vitamin D  2 tablet Oral Daily With Breakfast Patricio Come, CRNP      cefepime  2,000 mg Intravenous Q12H Dorothy Chino MD      clopidogrel  75 mg Oral Daily Patricio Come, CRNP      cyanocobalamin  100 mcg Oral Daily Patricio Come, CRNP      felodipine  5 mg Oral Daily Patricio Come, CRNP      finasteride  5 mg Oral Daily Patricio Come, CRNP      fish oil  1,000 mg Oral BID Patricio Come, CRNP      fluticasone-vilanterol  1 puff Inhalation Daily Patricio Come, CRNP      furosemide  20 mg Oral Before Joannasky Cruz, CRNP      [START ON 9/15/2020] furosemide  40 mg Oral Daily Leighton Coyne, CRNP      ipratropium-albuterol  3 mL Nebulization Q6H Patricio Come, CRNP      melatonin  6 mg Oral HS [facility] CR[facility]      ondansetron  4 mg Intravenous Q6H PRN Patricio Come, CRNP      PARoxetine  10 mg Oral Daily Patricio Come, CRNP      pravastatin  40 mg Oral Daily With Dinner Patricio Come, CRNP      rivaroxaban  20 mg Oral Daily With [address], CRNP            Today, Patient Was Seen By: Zayda Rubin MD    ** Please Note: "This note has been constructed using a voice recognition system  Therefore there may be syntax, spelling, and/or grammatical errors   Please call if you have any questions  "**

## 2020-09-14 NOTE — PROGRESS NOTES
Progress Note - Cardiology   [facility]     Duke Cook [de-identified] y o  male MRN: [id number]  : 1940  Unit/Bed#: [address] [place] [address] Encounter: [id number]    Assessment and Plan:   1  Shortness of breath:  Appears to be multifactorial   Evidence of volume overload on CT scan  Patient improved after IV diuretics  2  Elevated BNP:  2D echocardiogram to be repeated today  Previous EKG demonstrated ejection fraction 50 55%  May be some component of acute on chronic diastolic heart failure  -  will transition patient back to Lasix 40 mg in the a m  And 20 in the p m  As he was taking at home    -  encourage patient to monitor intake as appears he has been drinking a lot of fluids in hospital     -  nutritional consult regarding CHF/fluid restriction    3  Coronary artery disease:  Patient's most recent intervention was angioplasty to the LAD and RCA in May 2019  Continue current Plavix, beta-blocker and statin therapy  4  Chronic kidney disease:  Creatinine stable    5  Obstructive sleep apnea:  Patient compliant with CPAP    6  Chronic atrial fibrillation:  Patient 100% ventricularly paced with underlying atrial fibrillation  Patient has [employer] device        Subjective / Objective:   Patient seen and examined  2D echocardiogram being performed, patient lying comfortably flat in bed  No cardiac complaints offered  Vitals: Blood pressure 120/68, pulse 70, temperature 97 5 °F (36 4 °C), resp  rate 18, height 6' 1" (1 854 m), weight 136 kg (299 lb 2 6 oz), SpO2 97 %  Vitals:    20 0100 20 0600   Weight: 136 kg (299 lb 6 2 oz) 136 kg (299 lb 2 6 oz)     Body mass index is 39 47 kg/m²    BP Readings from Last 3 Encounters:   20 120/68   20 134/66   06/10/20 134/66     Orthostatic Blood Pressures      Most Recent Value   Blood Pressure  120/68 filed at 2020 4085   Patient Position - Orthostatic VS  Lying filed at 2020 1921        I/O 09/12 0701 - 09/13 0700 09/13 0701 - 09/14 0700 09/14 0701 - 09/15 0700    P  O  720 1275     Total Intake(mL/kg) 720 (5 3) 1275 (9 4)     Urine (mL/kg/hr) 1200 (0 4) 1625 (0 5)     Total Output 1200 1625     Net -480 -350            Unmeasured Urine Occurrence  1 x         Invasive Devices     Peripheral Intravenous Line            Peripheral IV 09/13/20 Right Forearm 1 day                  Intake/Output Summary (Last 24 hours) at 9/14/2020 1012  Last data filed at 9/14/2020 0400  Gross per 24 hour   Intake 720 ml   Output 1625 ml   Net -905 ml         Physical Exam:   Physical Exam  Vitals signs and nursing note reviewed  Constitutional:       Appearance: Normal appearance  He is well-developed  He is obese  HENT:      Head: Normocephalic  Right Ear: External ear normal       Left Ear: External ear normal       Nose: Nose normal    Eyes:      General: No scleral icterus  Right eye: No discharge  Left eye: No discharge  Conjunctiva/sclera: Conjunctivae normal       Pupils: Pupils are equal, round, and reactive to light  Neck:      Musculoskeletal: Normal range of motion and neck supple  Thyroid: No thyromegaly  Cardiovascular:      Rate and Rhythm: Normal rate and regular rhythm  Pulses: Normal pulses  Heart sounds: Heart sounds are distant  Murmur present  Systolic murmur present with a grade of 2/6  Pulmonary:      Effort: Pulmonary effort is normal       Breath sounds: Examination of the right-lower field reveals decreased breath sounds  Examination of the left-lower field reveals decreased breath sounds  Decreased breath sounds present  No wheezing, rhonchi or rales  Abdominal:      General: Bowel sounds are normal       Palpations: Abdomen is soft  Musculoskeletal:      Right lower leg: No edema  Left lower leg: No edema  Skin:     General: Skin is warm and dry  Capillary Refill: Capillary refill takes less than 2 seconds     Neurological: General: No focal deficit present  Mental Status: He is alert and oriented to person, place, and time  Mental status is at baseline  Psychiatric:         Attention and Perception: Attention normal          Mood and Affect: Mood normal          Speech: Speech normal          Behavior: Behavior normal          Thought Content:  Thought content normal          Cognition and Memory: Cognition normal          Judgment: Judgment normal                  Medications/ Allergies:     Current Facility-Administered Medications   Medication Dose Route Frequency Provider Last Rate    allopurinol  300 mg Oral Daily [place] Snide, CRNP      ascorbic acid  500 mg Oral Daily [place] Snide, CRNP      calcium carbonate-vitamin D  2 tablet Oral Daily With Breakfast [place] Snide, CRNP      cefTRIAXone  1,000 mg Intravenous Q24H [place] Snide, CRNP 1,000 mg (09/13/20 2125)    clopidogrel  75 mg Oral Daily [place] Snide, CRNP      cyanocobalamin  100 mcg Oral Daily [place] Snide, CRNP      felodipine  5 mg Oral Daily Hayley Snide, CRNP      finasteride  5 mg Oral Daily [place] Snide, CRNP      fish oil  1,000 mg Oral BID Hayley Snide, CRNP      fluticasone-vilanterol  1 puff Inhalation Daily Hayley Snide, CRNP      furosemide  40 mg Intravenous Daily [place] Snide, CRNP      ipratropium-albuterol  3 mL Nebulization Q6H Hayley Snide, CRNP      melatonin  6 mg Oral HS Cucristal [place], CRNP      ondansetron  4 mg Intravenous Q6H PRN Hayley Snide, CRNP      PARoxetine  10 mg Oral Daily Hayley Snide, CRNP      pravastatin  40 mg Oral Daily With Dinner [place] Snide, CRNP      rivaroxaban  20 mg Oral Daily With [employer], CRNP       ondansetron, 4 mg, Q6H PRN      No Known Allergies    VTE Pharmacologic Prophylaxis:   Sequential compression device (Venodyne)  and Xarelto    Labs:   Troponins:  Results from last 7 days   Lab Units 09/12/20  0923 09/12/20  0994 09/11/20  2256   TROPONIN I ng/mL 0 02 0 02 0 04       CBC with diff:  Results from last 7 days   Lab Units 09/14/20 0628 09/13/20 0446 09/12/20 0605 09/11/20  2257   WBC Thousand/uL 8 12 9 08 9 49 8 58   HEMOGLOBIN g/dL 10 8* 10 8* 11 9* 12 1   HEMATOCRIT % 34 8* 33 9* 36 9 38 0   MCV fL 96 94 94 95   PLATELETS Thousands/uL 146* 140* 153 177   MCH pg 29 9 30 0 30 4 30 1   MCHC g/dL 31 0* 31 9 32 2 31 8   RDW % 15 4* 15 1 15 0 15 1   MPV fL 10 0 10 1 9 7 9 6   NRBC AUTO /100 WBCs 0  --  0 0       CMP:  Results from last 7 days   Lab Units 09/14/20 0628 09/13/20 0446 09/12/20 0605 09/11/20  2256   SODIUM mmol/L 141 141 140 140   POTASSIUM mmol/L 4 0 3 5 4 0 5 0   CHLORIDE mmol/L 105 105 104 102   CO2 mmol/L 26 27 26 30   ANION GAP mmol/L 10 9 10 8   BUN mg/dL 30* 35* 33* 31*   CREATININE mg/dL 1 34* 1 54* 1 76* 2 00*   CALCIUM mg/dL 8 4 8 6 8 8 9 2   AST U/L  --   --   --  48*   ALT U/L  --   --   --  37   ALK PHOS U/L  --   --   --  64   TOTAL PROTEIN g/dL  --   --   --  7 7   ALBUMIN g/dL  --   --   --  2 9*   TOTAL BILIRUBIN mg/dL  --   --   --  0 60   EGFR ml/min/1 73sq m 50 42 36 31     Magnesium:  Results from last 7 days   Lab Units 09/13/20  0446   MAGNESIUM mg/dL 2 3     NT-proBNP:   Recent Labs     09/11/20  2256   NTBNP 1,892*        Imaging & Testing   I have personally reviewed pertinent reports  Xr Chest 1 View Portable    Result Date: 9/11/2020  Narrative: CHEST INDICATION:   sob  COMPARISON:  8/28/2020 EXAM PERFORMED/VIEWS:  XR CHEST PORTABLE FINDINGS:  Left-sided chest wall intracardiac device is identified  Leads are intact  Cardiomediastinal silhouette appears unremarkable  Increased hazy airspace disease throughout the right middle and lower lung fields  No pneumothorax or pleural effusion  Osseous structures appear within normal limits for patient age       Impression: Increased hazy airspace disease throughout the right middle and lower lung fields consistent with pneumonia or asymmetric pulmonary edema Workstation performed: [id number]     Xr Chest Pa & Lateral    Result Date: 8/28/2020  Narrative: CHEST INDICATION:   J18 9: Pneumonia, unspecified organism  COMPARISON:  Chest radiograph from 10/18/2019  Abdomen CT from 10/28/2019  Chest CT from 8/26/2019  EXAM PERFORMED/VIEWS:  XR CHEST PA & LATERAL FINDINGS: Cardiomediastinal silhouette appears unremarkable  Left subclavian pacemaker leads in right atrium and right ventricle  The lungs are clear  No pneumothorax or pleural effusion  Osseous structures appear within normal limits for patient age  Impression: No acute cardiopulmonary disease  Workstation performed: [id number]     Ct Chest Wo Contrast    Result Date: 9/12/2020  Narrative: CT CHEST WITHOUT IV CONTRAST INDICATION:   Pneumonia  COMPARISON:  CT chest 8/26/2019  TECHNIQUE: CT examination of the chest was performed without intravenous contrast   Axial, sagittal, and coronal 2D reformatted images were created from the source data and submitted for interpretation  Radiation dose length product (DLP) for this visit:  1407 75 mGy-cm   This examination, like all CT scans performed in the [facility], was performed utilizing techniques to minimize radiation dose exposure, including the use of iterative reconstruction and automated exposure control  FINDINGS: LUNGS:  Extensive groundglass and consolidative airspace disease throughout the right lung  Faint groundglass opacity seen throughout the left lung as well  Diffuse interlobar septal and peribronchovascular thickening     There is no tracheal or endobronchial lesion  PLEURA:  Unremarkable  HEART/GREAT VESSELS:  Atherosclerotic changes are noted in thoracic aorta and coronary arteries  MEDIASTINUM AND DEBAR:  Right hilar and mediastinal adenopathy  For instance a right hilar lymph node measures 1 6 cm (series 2, image 37) and right paratracheal lymph node measures 1 8 cm (series 2, 24)    Findings are likely reactive  CHEST WALL AND LOWER NECK:   Unremarkable  VISUALIZED STRUCTURES IN THE UPPER ABDOMEN:  Unremarkable  OSSEOUS STRUCTURES:  No acute fracture or destructive osseous lesion  Impression: Findings consistent with pneumonia mostly throughout the right lung  Mild CHF  Workstation performed: [id number]        EKG / Monitor: Personally reviewed  100% ventricular pacing    Cardiac testing:   Results for orders placed during the hospital encounter of 19   Echo complete with contrast if indicated    Laurie Campoverde [address]  [address] [facility]   [phone]    Transthoracic Echocardiogram  2D, M-mode, Doppler, and Color Doppler    Study date:  2019    Patient: Sangeetha Mai  MR number: [id number]  Account number: [de-identified]  : 1940  Age: 66 years  Gender: Male  Status: Inpatient  Location: Bedside  Height: 73 in  Weight: 384 1 lb  BP: 132/ 73 mmHg    Indications: Heart Failure    Diagnoses: I50 9 - Heart failure, unspecified    Sonographer:  ROGER Avila  Primary Physician:  Jacque Hicks  Referring Physician:  NAVA Leyva  Group:  [id number]  Interpreting Physician:  Mendez House MD    SUMMARY    PROCEDURE INFORMATION:  This was a technically difficult study  Echocardiographic views were limited due to restricted patient mobility, poor acoustic window availability, and decreased penetration  LEFT VENTRICLE:  Size was at the upper limits of normal   Systolic function was at the lower limits of normal  Ejection fraction was estimated in the range of 50 % to 55 % to be 50 %  Although no diagnostic regional wall motion abnormality was identified, this possibility cannot be completely excluded on the basis of this study  Wall thickness was mildly to moderately increased  There was mild to moderate concentric hypertrophy    Doppler parameters were consistent with high ventricular filling pressure  RIGHT VENTRICLE:  The ventricle was mildly dilated  LEFT ATRIUM:  The atrium was moderately dilated  RIGHT ATRIUM:  The atrium was mildly dilated  MITRAL VALVE:  There was mild regurgitation  AORTIC VALVE:  The valve was trileaflet  Leaflets exhibited mildly to moderately increased thickness, mild to moderate calcification, mildly reduced cuspal separation, and reduced mobility  There was mild to moderate stenosis by 2d  Limited echo  Peak velocity 3 m/sec, with peak gradient of 36 mm of hg  There was trace regurgitation  TRICUSPID VALVE:  There was trace regurgitation  The tricuspid jet envelope definition was inadequate for estimation of RV systolic pressure  HISTORY: PRIOR HISTORY: HTN, SA on CPAP, Pacemaker, CHF, Gout, A-Flutter, CAD    PROCEDURE: The procedure was performed at the bedside  This was a routine study  The transthoracic approach was used  The study included complete 2D imaging, M-mode, complete spectral Doppler, and color Doppler  The heart rate was 160 bpm,  at the start of the study  Intravenous contrast (Definity solution [1 3 ml Definity/8 7ml normal saline solution], 1 ml) was administered to opacify the left ventricle  Echocardiographic views were limited due to restricted patient  mobility, poor acoustic window availability, and decreased penetration  This was a technically difficult study  LEFT VENTRICLE: Size was at the upper limits of normal  Systolic function was at the lower limits of normal  Ejection fraction was estimated in the range of 50 % to 55 % to be 50 %  Although no diagnostic regional wall motion abnormality  was identified, this possibility cannot be completely excluded on the basis of this study  Wall thickness was mildly to moderately increased  There was mild to moderate concentric hypertrophy  DOPPLER: The study was not technically  sufficient to allow evaluation of LV diastolic function   Doppler parameters were consistent with high ventricular filling pressure  RIGHT VENTRICLE: The ventricle was mildly dilated  Systolic function was low normal  Not well visualized  LEFT ATRIUM: The atrium was moderately dilated  RIGHT ATRIUM: The atrium was mildly dilated  MITRAL VALVE: DOPPLER: The transmitral velocity was within the normal range  There was no evidence for stenosis  There was mild regurgitation  AORTIC VALVE: The valve was trileaflet  Leaflets exhibited mildly to moderately increased thickness, mild to moderate calcification, mildly reduced cuspal separation, and reduced mobility  DOPPLER: There was mild to moderate stenosis by  2d  Limited echo  Peak velocity 3 m/sec, with peak gradient of 36 mm of hg There was trace regurgitation  TRICUSPID VALVE: DOPPLER: There was trace regurgitation  The tricuspid jet envelope definition was inadequate for estimation of RV systolic pressure  PULMONIC VALVE: Not well visualized  PERICARDIUM: There was no thickening or calcification  There was no pericardial effusion  AORTA: The root exhibited normal size  SYSTEMIC VEINS: HEPATIC VEINS: The hepatic veins were not well visualized  SYSTEM MEASUREMENT TABLES    2D mode  AoR Diam 2D: 3 7 cm  LA Diam (2D): 5 1 cm  LA/Ao (2D): 1 38  FS (2D Teich): 23 %  IVSd (2D): 1 35 cm  LVDEV: 156 cmï¾³  LVESV: 84 9 cmï¾³  LVIDd(2D): 5 64 cm  LVISd (2D): 4 34 cm  LVOT Area 2D: 3 46 cmï¾²  LVPWd (2D): 1 3 cm  SV (Teich): 71 1 cmï¾³    Unspecified Scan Mode  LVOT Diam : 2 1 cm  MV Peak E Vitaly  Mean: 1380 mm/s  MVA (PHT): 3 79 cmï¾²  PHT: 58 ms    [address] Accredited Echocardiography Laboratory    Prepared and electronically signed by    Neeta Rodriguez MD  Signed 10-Apr-2019 13:56:32         Andreas Dong        "This note has been constructed using a voice recognition system  Therefore there may be syntax, spelling, and/or grammatical errors   Please call if you have any questions  "

## 2020-09-14 NOTE — OCCUPATIONAL THERAPY NOTE
OCCUPATIONAL THERAPY    Patient refused OT evaluation, reports being at baseline and feeling well  Wants to go home  Recommended to be active in room as much as possible  No further needs  Pt left sitting in room with daughter present  Nurse informed  Patricia Suarez Bahman 87, OTR/L, [place] Lic# [id number]

## 2020-09-14 NOTE — RESPIRATORY THERAPY NOTE
education given to daughter and patient, for copd, s&s of exacerbation, zone tool, conservation, better breathers, neb tx, mdi, rescue vs mainenance mdi, proper inhalr techniqe, pursed lip breathing, pft

## 2020-09-15 VITALS
DIASTOLIC BLOOD PRESSURE: 61 MMHG | HEART RATE: 69 BPM | BODY MASS INDEX: 39.77 KG/M2 | SYSTOLIC BLOOD PRESSURE: 124 MMHG | RESPIRATION RATE: 18 BRPM | TEMPERATURE: 97.4 F | WEIGHT: 300.05 LBS | OXYGEN SATURATION: 94 % | HEIGHT: 73 IN

## 2020-09-15 DIAGNOSIS — R78.81 GRAM-NEGATIVE BACTEREMIA: Primary | ICD-10-CM

## 2020-09-15 LAB
ANION GAP SERPL CALCULATED.3IONS-SCNC: 9 MMOL/L (ref 4–13)
ATRIAL RATE: 98 BPM
BACTERIA BLD CULT: ABNORMAL
BASOPHILS # BLD AUTO: 0.09 THOUSANDS/ΜL (ref 0–0.1)
BASOPHILS NFR BLD AUTO: 1 % (ref 0–1)
BUN SERPL-MCNC: 28 MG/DL (ref 5–25)
CALCIUM SERPL-MCNC: 8.5 MG/DL (ref 8.3–10.1)
CHLORIDE SERPL-SCNC: 103 MMOL/L (ref 100–108)
CO2 SERPL-SCNC: 27 MMOL/L (ref 21–32)
CREAT SERPL-MCNC: 1.35 MG/DL (ref 0.6–1.3)
EOSINOPHIL # BLD AUTO: 0.43 THOUSAND/ΜL (ref 0–0.61)
EOSINOPHIL NFR BLD AUTO: 6 % (ref 0–6)
ERYTHROCYTE [DISTWIDTH] IN BLOOD BY AUTOMATED COUNT: 15.1 % (ref 11.6–15.1)
GFR SERPL CREATININE-BSD FRML MDRD: 49 ML/MIN/1.73SQ M
GLUCOSE SERPL-MCNC: 117 MG/DL (ref 65–140)
GRAM STN SPEC: ABNORMAL
HCT VFR BLD AUTO: 34.3 % (ref 36.5–49.3)
HGB BLD-MCNC: 10.9 G/DL (ref 12–17)
IMM GRANULOCYTES # BLD AUTO: 0.05 THOUSAND/UL (ref 0–0.2)
IMM GRANULOCYTES NFR BLD AUTO: 1 % (ref 0–2)
LYMPHOCYTES # BLD AUTO: 1.32 THOUSANDS/ΜL (ref 0.6–4.47)
LYMPHOCYTES NFR BLD AUTO: 17 % (ref 14–44)
MCH RBC QN AUTO: 30.4 PG (ref 26.8–34.3)
MCHC RBC AUTO-ENTMCNC: 31.8 G/DL (ref 31.4–37.4)
MCV RBC AUTO: 96 FL (ref 82–98)
MONOCYTES # BLD AUTO: 0.59 THOUSAND/ΜL (ref 0.17–1.22)
MONOCYTES NFR BLD AUTO: 8 % (ref 4–12)
NEUTROPHILS # BLD AUTO: 5.12 THOUSANDS/ΜL (ref 1.85–7.62)
NEUTS SEG NFR BLD AUTO: 67 % (ref 43–75)
NRBC BLD AUTO-RTO: 0 /100 WBCS
PLATELET # BLD AUTO: 136 THOUSANDS/UL (ref 149–390)
PMV BLD AUTO: 9.8 FL (ref 8.9–12.7)
POTASSIUM SERPL-SCNC: 3.9 MMOL/L (ref 3.5–5.3)
PROCALCITONIN SERPL-MCNC: 0.29 NG/ML
QRS AXIS: -76 DEGREES
QRSD INTERVAL: 158 MS
QT INTERVAL: 466 MS
QTC INTERVAL: 503 MS
RBC # BLD AUTO: 3.58 MILLION/UL (ref 3.88–5.62)
SODIUM SERPL-SCNC: 139 MMOL/L (ref 136–145)
T WAVE AXIS: 96 DEGREES
VENTRICULAR RATE: 70 BPM
WBC # BLD AUTO: 7.6 THOUSAND/UL (ref 4.31–10.16)

## 2020-09-15 PROCEDURE — 94760 N-INVAS EAR/PLS OXIMETRY 1: CPT

## 2020-09-15 PROCEDURE — 94640 AIRWAY INHALATION TREATMENT: CPT

## 2020-09-15 PROCEDURE — 97530 THERAPEUTIC ACTIVITIES: CPT

## 2020-09-15 PROCEDURE — 99232 SBSQ HOSP IP/OBS MODERATE 35: CPT | Performed by: INTERNAL MEDICINE

## 2020-09-15 PROCEDURE — 85025 COMPLETE CBC W/AUTO DIFF WBC: CPT | Performed by: INTERNAL MEDICINE

## 2020-09-15 PROCEDURE — 93010 ELECTROCARDIOGRAM REPORT: CPT | Performed by: INTERNAL MEDICINE

## 2020-09-15 PROCEDURE — 99239 HOSP IP/OBS DSCHRG MGMT >30: CPT | Performed by: NURSE PRACTITIONER

## 2020-09-15 PROCEDURE — 80048 BASIC METABOLIC PNL TOTAL CA: CPT | Performed by: INTERNAL MEDICINE

## 2020-09-15 PROCEDURE — 84145 PROCALCITONIN (PCT): CPT | Performed by: INTERNAL MEDICINE

## 2020-09-15 RX ADMIN — ALLOPURINOL 300 MG: 300 TABLET ORAL at 08:09

## 2020-09-15 RX ADMIN — IPRATROPIUM BROMIDE AND ALBUTEROL SULFATE 3 ML: 2.5; .5 SOLUTION RESPIRATORY (INHALATION) at 14:14

## 2020-09-15 RX ADMIN — OXYCODONE HYDROCHLORIDE AND ACETAMINOPHEN 500 MG: 500 TABLET ORAL at 08:09

## 2020-09-15 RX ADMIN — IPRATROPIUM BROMIDE AND ALBUTEROL SULFATE 3 ML: 2.5; .5 SOLUTION RESPIRATORY (INHALATION) at 03:21

## 2020-09-15 RX ADMIN — FUROSEMIDE 40 MG: 40 TABLET ORAL at 08:09

## 2020-09-15 RX ADMIN — Medication 1000 MG: at 17:02

## 2020-09-15 RX ADMIN — PRAVASTATIN SODIUM 40 MG: 40 TABLET ORAL at 17:02

## 2020-09-15 RX ADMIN — POLYETHYLENE GLYCOL 3350 17 G: 17 POWDER, FOR SOLUTION ORAL at 06:31

## 2020-09-15 RX ADMIN — CLOPIDOGREL BISULFATE 75 MG: 75 TABLET ORAL at 08:09

## 2020-09-15 RX ADMIN — OYSTER SHELL CALCIUM WITH VITAMIN D 2 TABLET: 500; 200 TABLET, FILM COATED ORAL at 08:09

## 2020-09-15 RX ADMIN — DOCUSATE SODIUM 100 MG: 100 CAPSULE, LIQUID FILLED ORAL at 08:09

## 2020-09-15 RX ADMIN — Medication 1000 MG: at 08:09

## 2020-09-15 RX ADMIN — FINASTERIDE 5 MG: 5 TABLET, FILM COATED ORAL at 08:09

## 2020-09-15 RX ADMIN — VITAM B12 100 MCG: 100 TAB at 08:09

## 2020-09-15 RX ADMIN — DOCUSATE SODIUM 100 MG: 100 CAPSULE, LIQUID FILLED ORAL at 17:02

## 2020-09-15 RX ADMIN — RIVAROXABAN 20 MG: 10 TABLET, FILM COATED ORAL at 17:02

## 2020-09-15 RX ADMIN — FELODIPINE 5 MG: 2.5 TABLET, FILM COATED, EXTENDED RELEASE ORAL at 08:09

## 2020-09-15 RX ADMIN — CEFEPIME HYDROCHLORIDE 2000 MG: 2 INJECTION, POWDER, FOR SOLUTION INTRAVENOUS at 14:44

## 2020-09-15 RX ADMIN — PAROXETINE HYDROCHLORIDE 10 MG: 20 TABLET, FILM COATED ORAL at 08:09

## 2020-09-15 RX ADMIN — IPRATROPIUM BROMIDE AND ALBUTEROL SULFATE 3 ML: 2.5; .5 SOLUTION RESPIRATORY (INHALATION) at 07:32

## 2020-09-15 RX ADMIN — CEFEPIME HYDROCHLORIDE 2000 MG: 2 INJECTION, POWDER, FOR SOLUTION INTRAVENOUS at 02:55

## 2020-09-15 RX ADMIN — FLUTICASONE FUROATE AND VILANTEROL TRIFENATATE 1 PUFF: 100; 25 POWDER RESPIRATORY (INHALATION) at 08:09

## 2020-09-15 RX ADMIN — FUROSEMIDE 20 MG: 20 TABLET ORAL at 17:02

## 2020-09-15 NOTE — DISCHARGE INSTRUCTIONS
Bacteremia   WHAT YOU NEED TO KNOW:   Bacteremia is when there is bacteria in the blood  Bacteremia happens when germs from infections in your body travel to your blood  It can also be caused by a catheter or drain that is inserted into the body and left in place  Examples of catheters and drains include a port-a-cath, PICC line, dialysis catheter, abdominal drain, or a urinary catheter  DISCHARGE INSTRUCTIONS:   Call 911 for any of the following:   · You have a seizure or lose consciousness  · You have trouble breathing  · You feel extremely weak and have a hard time moving  Seek care immediately if:   · Your symptoms, such as fever, get worse, even if you are taking medicine to treat the infection  · You stop urinating or urinate very little  Contact your healthcare provider if:   · You have questions or concerns about your condition or care  Medicines: You may need any of the following:  · Antibiotics  may be given to treat an infection  You may be given antibiotics through an IV for several weeks  You may instead be given oral antibiotics  Do not stop taking your antibiotics when you feel better  Take all of your medicine until it is finished  This may prevent the infection from returning or getting worse  · Acetaminophen  helps decrease pain and fever  Taking too much acetaminophen can hurt your liver  Read labels so that you know the active ingredients in each medicine that you take  Talk to your healthcare provider before taking more than one medicine that contains acetaminophen  Ask your healthcare provider before taking over-the-counter medicine if you are also taking pain medicine prescribed (ordered) for you  · NSAIDs , such as ibuprofen, help decrease swelling, pain, and fever  This medicine is available with or without a doctor's order  NSAIDs can cause stomach bleeding or kidney problems in certain people   If you take blood thinner medicine, always ask your healthcare provider if NSAIDs are safe for you  Always read the medicine label and follow directions  · Take your medicine as directed  Contact your healthcare provider if you think your medicine is not helping or if you have side effects  Tell him of her if you are allergic to any medicine  Keep a list of the medicines, vitamins, and herbs you take  Include the amounts, and when and why you take them  Bring the list or the pill bottles to follow-up visits  Carry your medicine list with you in case of an emergency  Prevent bacteremia:   · Care for catheters and drains as directed  Wash your hands before and after you touch your catheter or drain  Follow directions for dressing changes and bathing  Watch for signs and symptoms of infection such as pus, fever, swelling, pain or drainage  Report symptoms immediately to your healthcare provider  · Get vaccinated  Get all recommended vaccinations  The pneumonia and influenza vaccines may prevent lung infections that could cause bacteremia  Follow up with your healthcare provider as directed: You may need to return for more blood tests  This will tell your healthcare provider if the antibiotics are working  Write down your questions so you remember to ask them during your visits  © 2017 [employer] Information is for End User's use only and may not be sold, redistributed or otherwise used for commercial purposes  All illustrations and images included in CareNotes® are the copyrighted property of A D A M [employer] Inc  or Oseas Santos  The above information is an  only  It is not intended as medical advice for individual conditions or treatments  Talk to your doctor, nurse or pharmacist before following any medical regimen to see if it is safe and effective for you

## 2020-09-15 NOTE — DISCHARGE SUMMARY
Discharge- [facility] Record 1940, [de-identified] y o  male MRN: [id number]    Unit/Bed#: [address] [place] [address] Encounter: [id number]    Primary Care Provider: Sandra Sloan DO   Date and time admitted to hospital: 9/11/2020  9:36 PM        * Acute respiratory failure with hypoxia (HCC)  Assessment & Plan  Oxygen saturation in the low 80s at home, high 80s on arrival to the ER  Likely secondary to CHF  Improvied after IV Lasix  Currently saturating adequately on room air  Lungs diminished the bases  Patient follows outpatient with Dr Dexter Núñez, cardiology  Has an appointment next week with him, will keep appointment  Patient will also follow up with his primary care physician 1-2 weeks post discharge  Pneumonia  Assessment & Plan  Patient was exerting himself on evening of admission and developed shortness of breath  He denies any fevers, cough or chills  Denies sick contacts  Patient was treated with azithromycin last week for URI, negative CXR at that time  With clinical improvement  Pneumonia suspect on presentation based on radiographic unilateral finding  Patient is afebrile with normal white count  Doubt bacterial pneumonia  · Patient improved during hospitalization  · SARS-CoV-2 PCR-negative  · Urine Legionella pneumococcal antigen negative  · Patient had received IV Rocephin during hospitalization, currently off antibiotics  · Procalcitonin low, 1 of 2 sets blood cultures showed negative rods, id was consulted  Suspect may be contaminant or transitory  Additional set of blood cultures were drawn which are negative at 48 hours  Discussed with ID, patient will have additional set of blood cultures drawn in 1 week with results to Infectious Disease  · Patient had a CT of abdomen and pelvis with oral contrast performed on 09/14 which showed a nonobstructing left renal calculus, fat containing ventral hernia and no etiology of infection as per radiology report    · Patient will be discharged home today with follow-up with his primary care physician 1-2 weeks post discharge  Gram-negative bacteremia  Assessment & Plan  1/2 blood culture noted to be positive for Gram-negative bacteria  Unclear source  Patient denies any  or GI symptoms except good urine output with diuretics  UA not suggestive of urinary tract infection  · Repeat blood culture negative at 48 hours, as noted above patient will have additional set of 2 blood cultures drawn in 1 week as per ID  · Urine culture showed no growth  · Patient had been on IV Rocephin during hospitalization, currently off antibiotics  · Procalcitonin low  · CT of abdomen pelvis with oral contrast performed on 09/14 as noted above  Acute on chronic diastolic congestive heart failure (HCC)  Assessment & Plan  Wt Readings from Last 3 Encounters:   09/13/20 136 kg (299 lb 2 6 oz)   08/26/20 (!) 141 kg (310 lb)   06/10/20 (!) 141 kg (310 lb)     Patient reports that he gained 4 lbs after going out to dinner followed by breakfast the next day  He had increased edema  He was short of breath with exertion evening  Clinical presentation consistent with CHF  Chest x-ray-air space disease on the right middle and lower lobe  CT scan diffuse infiltrate right more than left  Lymphadenopathy noted  Prior echocardiogram EF 55%,  Improved with IV Lasix  · telemetry-unremarkable  Discontinued  · Serial cardiac marker-negative  · Transitioned to p o  Diuretics   · Salt and fluid restriction, discussed with patient  · Cardiology evaluation appreciated; patient will continue to follow with his outpatient cardiologist Dr Jose J Hanks; has an appointment for next week established  · Patient be discharged home today      COPD (chronic obstructive pulmonary disease) ([facility])  Assessment & Plan  No evidence of exacerbation  Continue araseli and breo    CAD (coronary artery disease)  Assessment & Plan  History of PCI to the LADx2, RCA and circumflex,   Serial troponins-negative  Continue plavix, metoprolol, statin    Chronic a-fib ([facility])  Assessment & Plan  History of complete heart block post pacemaker  Continue metoprolol and xarelto    CKD (chronic kidney disease) stage 3, GFR 30-59 ml/min ([facility])  Assessment & Plan  Baseline Cr 1 2-1 5, elevated at 2 0, decreased to 1 35  Likely cardiorenal  Creatinine improved to baseline with diuresis  Patient will follow-up with his primary care physician 1-2 weeks post discharge  Patient also follows with outpatient nephrology  Anemia  Assessment & Plan  Normocytic, hemoglobin decline from 12 1-10 8 during hospitalization  No overt bleeding  Currently 10 9  Follow-up with PCP 1-2 weeks post discharge  JOVI (obstructive sleep apnea)  Assessment & Plan  Continue CPAP q h s   Follow-up with primary care physician 1-2 weeks post discharge    Chronic gout without tophus  Assessment & Plan  continue allopurinol   Patient denies any flare-up at this time  Discharging Physician / Practitioner: NAVA Vargas  PCP: Kwesi Gutierres DO  Admission Date:   Admission Orders (From admission, onward)     Ordered        09/11/20 2327  Inpatient Admission  Once                   Discharge Date: 09/15/20    Resolved Problems  Date Reviewed: 9/15/2020    None          Consultations During Hospital Stay:  · Cardiology  · Infectious disease    Procedures Performed:     · Echocardiogram performed 9/14 showed EF to be 55%, wall thickness mildly to moderate Olivia increased, mild concentric hypertrophy, mild regurgitation mitral valve, moderate to severe stenosis of aortic valve, mild to moderate regurgitation of tricuspid valve, estimated PA pressure 55-60 mmHg suggestive of moderate pulmonary hypertension and trace regurgitation of pulmonic valve  This was compared to previous study of April 8, 2019, as per Cardiology aortic stenosis has worsened and pulmonary artery pressure has increased      Significant Findings / Test Results:     · Chest x-ray performed on 09/11 shows increased hazy airspace disease throughout right middle and lower lung fields consistent with pneumonia or asymmetric pulmonary edema as per radiology report  · CT of chest performed on 09/12 shows findings consistent with pneumonia mostly throughout the right lung, mild CHF as per radiology report  · CT of abdomen and pelvis performed on 09/14 shows no etiology for infection identified, nonobstructing left renal calculus and fat containing ventral hernia as per radiology report  Incidental Findings:   · None     Test Results Pending at Discharge (will require follow up): · Final blood cultures, currently negative at 48 hours  Outpatient Tests Requested:  · Two sets of blood cultures to be drawn 1 week post discharge, ordered by Dr Stacy Cordova  Complications:  None    Reason for Admission:  Shortness of breath    Hospital Course:     Alisson Cheema is a [de-identified] y o  male patient past medical history of Chronic obstrucive pulmonary disease, HTN, atrial flutter post ppm for complete heart block, obstructive sleep apnea, CKD, CAD post PCI x4 who originally presented to the hospital on 9/11/2020 due to shortness of breath  Patient was exerting himself in the evening when he developed shortness of breath  He stated it was worse than usual, and he checked his pulse oximetry which was in the low 80s; patient decided to present to the emergency department for further evaluation treatment  In the ER patient remained hypoxic with O2 sat of 86% on room air  He did respond to supplemental oxygen  Only lab significant was creatinine elevated to 2 0  Chest x-ray was concerning for pneumonia versus vascular congestion  CT of the chest was ordered which showed pneumonia mostly throughout the right lung, mild CHF  The patient was placed on IV Rocephin azithromycin, urine for strep and Legionella were negative  Blood cultures were drawn, 1 of 2 sets did grow Gram-negative rods  Infectious Disease was consulted  Additional blood cultures were drawn which were negative at 48 hours  Patient is to have additional set of blood cultures drawn in 1 week outpatient with results to Infectious Disease  Procalcitonin is negative, patient has been afebrile and WBCs within normal limits off antibiotics  Cardiology was consulted, patient did receive IV Lasix and did improve with his symptoms during hospitalization, he was transition back to his home medication of oral diuretics  Echocardiogram was performed during hospitalization which did show worsening aortic stenosis and worsening pulmonary artery hypertension from previous exam performed April 2019  The patient appears euvolemic and not in any respiratory distress at time of discharge  He will follow-up with his primary care physician 1-2 weeks post discharge  Patient already has appointment with his outpatient cardiologist in 1 week  He is to have outpatient blood cultures drawn in 1 week with results to Infectious Disease  This was discussed with the patient and his wife at the bedside, they both verbalized understanding and are agreeable to the plan  Patient is discharged home today  Please see above list of diagnoses and related plan for additional information  Condition at Discharge: good     Discharge Day Visit / Exam:     Subjective:  Patient seen sitting up in chair, playing cards with his daughter  Reports he feels very good and is hopeful that he is going to be able go home today  Reports that he slept well last night, denies any complaints of nausea, vomiting, diarrhea, chest pain, abdominal pain, shortness of breath  Reports that he had a good bowel movement and is urinating without difficulty      Vitals: Blood Pressure: 124/61 (09/15/20 1557)  Pulse: 69 (09/15/20 1557)  Temperature: (!) 97 4 °F (36 3 °C) (09/15/20 1557)  Temp Source: Temporal (09/12/20 1624)  Respirations: 18 (09/15/20 1557)  Height: 6' 1" (185 4 cm) (09/12/20 0100)  Weight - Scale: (!) 136 kg (300 lb 0 7 oz) (09/15/20 0600)  SpO2: 94 % (09/15/20 1557)     Exam:   Physical Exam  Vitals signs and nursing note reviewed  HENT:      Head: Normocephalic  Nose: Nose normal       Mouth/Throat:      Mouth: Mucous membranes are moist    Eyes:      Extraocular Movements: Extraocular movements intact  Conjunctiva/sclera: Conjunctivae normal    Neck:      Musculoskeletal: Normal range of motion  Cardiovascular:      Rate and Rhythm: Normal rate  Heart sounds: Murmur present  Pulmonary:      Effort: Pulmonary effort is normal       Comments: Diminished at bases bilaterally  Abdominal:      General: Bowel sounds are normal       Palpations: Abdomen is soft  Comments: Abdomen obese   Genitourinary:     Comments: Voiding spontaneously  Musculoskeletal: Normal range of motion  General: No swelling  Skin:     General: Skin is warm and dry  Capillary Refill: Capillary refill takes less than 2 seconds  Neurological:      Mental Status: He is alert and oriented to person, place, and time  Psychiatric:         Mood and Affect: Mood normal          Behavior: Behavior normal          Thought Content: Thought content normal          Judgment: Judgment normal            Discussion with Family:  I spoke with the patient at the bedside, I also spoke with the patient's daughter and wife at the bedside  I have answered all questions to the best of my abilities  Discharge instructions/Information to patient and family:   See after visit summary for information provided to patient and family  Provisions for Follow-Up Care:  See after visit summary for information related to follow-up care and any pertinent home health orders        Disposition:     Home    For Discharges to [facility] SNF:   · Not Applicable to this Patient - Not Applicable to this Patient    Planned Readmission:  No     Discharge Statement:  I spent greater than 30 minutes discharging the patient  This time was spent on the day of discharge  I had direct contact with the patient on the day of discharge  Greater than 50% of the total time was spent examining patient, answering all patient questions, arranging and discussing plan of care with patient as well as directly providing post-discharge instructions  Additional time then spent on discharge activities  Discharge Medications:  See after visit summary for reconciled discharge medications provided to patient and family        ** Please Note: This note has been constructed using a voice recognition system **

## 2020-09-15 NOTE — PHYSICAL THERAPY NOTE
PT TREATMENT     09/15/20 1515   Pain Assessment   Pain Assessment Tool Pain Assessment not indicated - pt denies pain   General   Chart Reviewed Yes   Cognition   Overall Cognitive Status WFL   Subjective   Subjective "ready to go home"   Bed Mobility   Supine to Sit 7  Independent   Sit to Supine 7  Independent   Transfers   Sit to Stand 7  Independent   Stand to Sit 7  Independent   Stand pivot 7  Independent   Ambulation/Elevation   Gait Assistance 7  Independent   Assistive Device   (none)   Distance 100 feet in room   Assessment   Prognosis Good   Problem List Decreased endurance;Decreased mobility   Assessment Pt declined ambulation in hallway but agreed to ambulate in room  Pt demonstrates steady gait  Pt is likely near baseline level of function  Plan   Treatment/Interventions ADL retraining;Functional transfer training;LE strengthening/ROM; Elevations; Therapeutic exercise; Endurance training;Gait training;Patient/family training   Progress Progressing toward goals   PT Frequency 5x/wk   Recommendation   PT Discharge Recommendation Return to previous environment with social support   Licensure   [place] License Number  Sudha [facility] PT [id number]

## 2020-09-15 NOTE — PROGRESS NOTES
Progress Note - Infectious Disease   Jean Cantu [de-identified] y o  male MRN: [id number]  Unit/Bed#: [address] Encounter: [id number]      Impression/Plan:  1  Gram-negative bacteremia-with only 1 of 2 sets positive  The patient's clinical illness essentially resolved within 3 hours of arrival making a gram-negative infection as the cause of his symptomatology very unlikely  I suspect the positive blood culture represents a contaminant with no clinical sepsis, no leukocytosis, and a low procalcitonin level  While is unusual for gram-negative rods to represent contaminant, this is a would above great could of contaminated the culture process  Consideration for the possibility of transient bacteremia associated with the patient constipation but CT the abdomen pelvis is negative  Follow-up blood cultures are negative thus far  He remains hemodynamically stable   -discontinue cefepime  -no additional antibiotic  -follow up repeat blood cultures  -if follow-up blood cultures remain negative by later this afternoon, can discharge home  -additional workup as needed   -recheck blood cultures x2 sets in 1 week as the patient does have a pacemaker in place and 1 make sure that there is no relapse     2  Acute hypoxic respiratory failure-suspect secondary to acute on chronic diastolic heart failure with COPD possibly playing a role  Consideration for the possibility of pneumonia although the clinical presentation is less consistent with this with rapid resolution with treatment for the heart failure  No longer requiring any oxygen support  -diuresis for congestive heart failure  -antibiotics as above  -monitor respiratory status  -supportive care     3  Chronic kidney disease-stage III  Renal function improved since admission with volume management  -continue volume management  -monitor BMP     4  Chronic atrial fibrillation-status post pacemaker placement and on rate control and anticoagulation     5   COPD-no current clinical evidence of an acute exacerbation at this time     Have discussed the above management plan in detail with the primary service    Have discussed the above management plan in detail with the patient and his daughter    Lisseth Kelley to discharge home from Infectious Disease standpoint  Recheck blood cultures x2 sets in 1 week to make sure no recurrence of the positive blood culture  Blood cultures will be ordered by him the Infectious Disease office and be in the [facility] system  Antibiotics:  Cefepime 2  Antibiotics 5    Subjective:  Patient has no fever, chills, sweats; no nausea, vomiting, diarrhea; no cough, shortness of breath; no pain  No new symptoms  He is feeling very well and is very anxious to go    Objective:  Vitals:  Temp:  [97 5 °F (36 4 °C)-98 °F (36 7 °C)] 97 5 °F (36 4 °C)  HR:  [69-70] 69  Resp:  [16-18] 16  BP: (117-119)/(50-66) 118/50  SpO2:  [94 %-99 %] 96 %  Temp (24hrs), Av 8 °F (36 6 °C), Min:97 5 °F (36 4 °C), Max:98 °F (36 7 °C)  Current: Temperature: 97 5 °F (36 4 °C)    Physical Exam:   General Appearance:  Alert, interactive, nontoxic, no acute distress  Throat: Oropharynx moist without lesions  Lungs:   Clear to auscultation bilaterally; no wheezes, rhonchi or rales; respirations unlabored   Heart:  RRR; no murmur, rub or gallop   Abdomen:   Soft, non-tender, non-distended, positive bowel sounds  Extremities: No clubbing, cyanosis or edema   Skin: No new rashes or lesions  No draining wounds noted         Labs, Imaging, & Other studies:   All pertinent labs and imaging studies were personally reviewed  Results from last 7 days   Lab Units 09/15/20  0623 09/14/20  0628 09/13/20  0446   WBC Thousand/uL 7 60 8 12 9 08   HEMOGLOBIN g/dL 10 9* 10 8* 10 8*   PLATELETS Thousands/uL 136* 146* 140*     Results from last 7 days   Lab Units 09/15/20  0623 09/14/20  0628 206  20  2256   SODIUM mmol/L 139 141 141   < > 140   POTASSIUM mmol/L 3 9 4 0 3 5   < > 5  0   CHLORIDE mmol/L 103 105 105   < > 102   CO2 mmol/L 27 26 27   < > 30   BUN mg/dL 28* 30* 35*   < > 31*   CREATININE mg/dL 1 35* 1 34* 1 54*   < > 2 00*   EGFR ml/min/1 73sq m 49 50 42   < > 31   CALCIUM mg/dL 8 5 8 4 8 6   < > 9 2   AST U/L  --   --   --   --  48*   ALT U/L  --   --   --   --  37   ALK PHOS U/L  --   --   --   --  64    < > = values in this interval not displayed  Results from last 7 days   Lab Units 09/13/20  1146 09/13/20  1051 09/12/20  0800 09/12/20  0410 09/11/20  2358   BLOOD CULTURE  No Growth at 24 hrs  No Growth at 24 hrs   --   --   --  No Growth at 72 hrs    Acinetobacter lwoffii*   GRAM STAIN RESULT   --   --   --   --  Gram negative rods*   URINE CULTURE   --  No Growth <1000 cfu/mL  --   --   --    MRSA CULTURE ONLY   --   --  No Methicillin Resistant Staphlyococcus aureus (MRSA) isolated  --   --    LEGIONELLA URINARY ANTIGEN   --   --   --  Negative  --      Results from last 7 days   Lab Units 09/15/20  0623 09/14/20  0628 09/13/20  0446 09/12/20  0605   PROCALCITONIN ng/ml 0 29* 0 33* 0 19 0 21        CT abdomen pelvis-no abscess or other source of sepsis found    Images personally reviewed by me in PACS

## 2020-09-15 NOTE — PROGRESS NOTES
Progress Note - Cardiology   [facility]     Randal Lanier [de-identified] y o  male MRN: [id number]  : 1940  Unit/Bed#: [address] [place], [place] [id number] Encounter: [id number]    Assessment and Plan:   1  Shortness of breath:  Appears to be multifactorial   Evidence of volume overload on CT scan        2  Elevated BNP:   Echo demonstrates EF of 55%  When compared to previous echo of 2019 there appears to be worsening of aortic stenosis with FLY of 1 cm2    -   continue oral Lasix, monitor renal function and I&O    -  encourage patient to monitor intake as appears he has been drinking a lot of fluids in hospital     -  nutritional consult regarding CHF/fluid restriction     3  Coronary artery disease:  Patient's most recent intervention was angioplasty to the LAD and RCA in May 2019  Continue current Plavix, beta-blocker and statin therapy      4  Chronic kidney disease:  Creatinine stable     5  Obstructive sleep apnea:  Patient compliant with CPAP     6  Chronic atrial fibrillation:  Patient 100% ventricularly paced with underlying atrial fibrillation  Patient has [employer] device      Subjective / Objective:   Patient seen and examined  He is resting comfortably in bed  He offers no cardiac complaints  Appears to be at his baseline with his edema and breathing  Vitals: Blood pressure 118/50, pulse 69, temperature 97 5 °F (36 4 °C), resp  rate 16, height 6' 1" (1 854 m), weight (!) 136 kg (300 lb 0 7 oz), SpO2 96 %  Vitals:    20 0600 09/15/20 0600   Weight: 136 kg (299 lb 2 6 oz) (!) 136 kg (300 lb 0 7 oz)     Body mass index is 39 59 kg/m²    BP Readings from Last 3 Encounters:   09/15/20 118/50   20 134/66   06/10/20 134/66     Orthostatic Blood Pressures      Most Recent Value   Blood Pressure  118/50 filed at 09/15/2020 0734   Patient Position - Orthostatic VS  Lying filed at 2020 1921        I/O       701 -  07 - 09/15 0700 09/15 0701 -  0700    P  O  1275      Total Intake(mL/kg) 1275 (9 4)      Urine (mL/kg/hr) 1625 (0 5) 2850 (0 9)     Total Output 1625 2850     Net -350 -2850            Unmeasured Urine Occurrence 1 x          Invasive Devices     Peripheral Intravenous Line            Peripheral IV 09/13/20 Right Forearm 2 days                  Intake/Output Summary (Last 24 hours) at 9/15/2020 1046  Last data filed at 9/15/2020 0601  Gross per 24 hour   Intake    Output 2350 ml   Net -2350 ml         Physical Exam:   Physical Exam  Vitals signs and nursing note reviewed  Constitutional:       Appearance: Normal appearance  He is well-developed  He is obese  HENT:      Head: Normocephalic  Right Ear: External ear normal       Left Ear: External ear normal       Nose: Nose normal    Eyes:      General: No scleral icterus  Right eye: No discharge  Left eye: No discharge  Conjunctiva/sclera: Conjunctivae normal       Pupils: Pupils are equal, round, and reactive to light  Neck:      Musculoskeletal: Normal range of motion and neck supple  Thyroid: No thyromegaly  Cardiovascular:      Rate and Rhythm: Normal rate and regular rhythm  Pulses: Normal pulses  Heart sounds: Murmur present  Systolic murmur present with a grade of 2/6  Pulmonary:      Effort: Pulmonary effort is normal       Breath sounds: Examination of the right-lower field reveals decreased breath sounds  Examination of the left-lower field reveals decreased breath sounds  Decreased breath sounds present  No wheezing, rhonchi or rales  Abdominal:      General: Bowel sounds are normal       Palpations: Abdomen is soft  Musculoskeletal: Normal range of motion  Right lower leg: No edema  Left lower leg: No edema  Comments: Trace pedal and ankle edema with component lymphedema, appears to be chronic and at his baseline   Skin:     General: Skin is warm and dry        Capillary Refill: Capillary refill takes less than 2 seconds  Neurological:      General: No focal deficit present  Mental Status: He is alert and oriented to person, place, and time  Mental status is at baseline  Psychiatric:         Attention and Perception: Attention normal          Mood and Affect: Mood normal          Speech: Speech normal          Behavior: Behavior normal          Thought Content:  Thought content normal          Cognition and Memory: Cognition normal          Judgment: Judgment normal                  Medications/ Allergies:     Current Facility-Administered Medications   Medication Dose Route Frequency Provider Last Rate    allopurinol  300 mg Oral Daily Dossie Mattes, CRNP      ascorbic acid  500 mg Oral Daily Dossie Mattes, CRNP      calcium carbonate-vitamin D  2 tablet Oral Daily With Breakfast Dossie Mattes, CRNP      cefepime  2,000 mg Intravenous Q12H Glen Gil MD 2,000 mg (09/15/20 0255)    clopidogrel  75 mg Oral Daily Dossie Mattes, CRNP      cyanocobalamin  100 mcg Oral Daily Dossie Mattes, CRNP      docusate sodium  100 mg Oral BID Nenita Reyna MD      felodipine  5 mg Oral Daily Dossie Mattes, CRNP      finasteride  5 mg Oral Daily Dossie Mattes, CRNP      fish oil  1,000 mg Oral BID Dossie Mattes, CRNP      fluticasone-vilanterol  1 puff Inhalation Daily Dossie Mattes, CRNP      furosemide  20 mg Oral Before Dorothy Ally, CRNP      furosemide  40 mg Oral Daily Geary Fortes, CRNP      ipratropium-albuterol  3 mL Nebulization Q6H Dossie Lety, CRNP      melatonin  6 mg Oral HS Kel Arce, CRNP      ondansetron  4 mg Intravenous Q6H PRN Dossie Mattes, CRNP      PARoxetine  10 mg Oral Daily Dossie Mattes, CRNP      polyethylene glycol  17 g Oral Daily PRN Nenita Reyna MD      pravastatin  40 mg Oral Daily With Dinner Dossimary Davidson, CRNP      rivaroxaban  20 mg Oral Daily With Hopela, CRNP       ondansetron, 4 mg, Q6H PRN  polyethylene glycol, 17 g, Daily PRN      No Known Allergies    VTE Pharmacologic Prophylaxis:   Sequential compression device (Venodyne)     Labs:   Troponins:  Results from last 7 days   Lab Units 09/12/20 0923 09/12/20 0605 09/11/20  2256   TROPONIN I ng/mL 0 02 0 02 0 04     CBC with diff:  Results from last 7 days   Lab Units 09/15/20  0623 09/14/20  0628 09/13/20  0446 09/12/20  0605 09/11/20  2257   WBC Thousand/uL 7 60 8 12 9 08 9 49 8 58   HEMOGLOBIN g/dL 10 9* 10 8* 10 8* 11 9* 12 1   HEMATOCRIT % 34 3* 34 8* 33 9* 36 9 38 0   MCV fL 96 96 94 94 95   PLATELETS Thousands/uL 136* 146* 140* 153 177   MCH pg 30 4 29 9 30 0 30 4 30 1   MCHC g/dL 31 8 31 0* 31 9 32 2 31 8   RDW % 15 1 15 4* 15 1 15 0 15 1   MPV fL 9 8 10 0 10 1 9 7 9 6   NRBC AUTO /100 WBCs 0 0  --  0 0     CMP:  Results from last 7 days   Lab Units 09/15/20  0623 09/14/20  0628 09/13/20  0446 09/12/20  0605 09/11/20  2256   SODIUM mmol/L 139 141 141 140 140   POTASSIUM mmol/L 3 9 4 0 3 5 4 0 5 0   CHLORIDE mmol/L 103 105 105 104 102   CO2 mmol/L 27 26 27 26 30   ANION GAP mmol/L 9 10 9 10 8   BUN mg/dL 28* 30* 35* 33* 31*   CREATININE mg/dL 1 35* 1 34* 1 54* 1 76* 2 00*   CALCIUM mg/dL 8 5 8 4 8 6 8 8 9 2   AST U/L  --   --   --   --  48*   ALT U/L  --   --   --   --  37   ALK PHOS U/L  --   --   --   --  64   TOTAL PROTEIN g/dL  --   --   --   --  7 7   ALBUMIN g/dL  --   --   --   --  2 9*   TOTAL BILIRUBIN mg/dL  --   --   --   --  0 60   EGFR ml/min/1 73sq m 49 50 42 36 31     Magnesium:  Results from last 7 days   Lab Units 09/13/20  0446   MAGNESIUM mg/dL 2 3       Imaging & Testing   I have personally reviewed pertinent reports  Ct Abdomen Pelvis Wo Contrast    Result Date: 9/15/2020  Narrative: CT ABDOMEN AND PELVIS WITHOUT IV CONTRAST INDICATION:   GNR bacteremia  COMPARISON:  10/28/2019   TECHNIQUE:  CT examination of the abdomen and pelvis was performed without intravenous contrast   Axial, sagittal, and coronal 2D reformatted images were created from the source data and submitted for interpretation  Radiation dose length product (DLP) for this visit:  Marline Patel mGy-cm   This examination, like all CT scans performed in the [facility], was performed utilizing techniques to minimize radiation dose exposure, including the use of iterative reconstruction and automated exposure control  Enteric contrast was administered  FINDINGS: ABDOMEN LOWER CHEST:  No clinically significant abnormality identified in the visualized lower chest  LIVER/BILIARY TREE:  Unremarkable  GALLBLADDER:  Gallbladder is surgically absent  SPLEEN:  Unremarkable  PANCREAS:  Unremarkable  ADRENAL GLANDS:  Unremarkable  KIDNEYS/URETERS:  2 mm nonobstructing left renal calculus is present  STOMACH AND BOWEL:  Unremarkable  APPENDIX:  No findings to suggest appendicitis  ABDOMINOPELVIC CAVITY:  No ascites  No pneumoperitoneum  No lymphadenopathy  VESSELS:  Mild ectasia of the abdominal aorta is noted  PELVIS REPRODUCTIVE ORGANS:  Unremarkable for patient's age  URINARY BLADDER:  Unremarkable  ABDOMINAL WALL/INGUINAL REGIONS:  Small fat-containing ventral hernia is present  OSSEOUS STRUCTURES:  No acute fracture or destructive osseous lesion  Impression: No etiology for infection identified  Nonobstructing left renal calculus  Fat-containing ventral hernia  Workstation performed: [id number]     Xr Chest 1 View Portable    Result Date: 9/11/2020  Narrative: CHEST INDICATION:   sob  COMPARISON:  8/28/2020 EXAM PERFORMED/VIEWS:  XR CHEST PORTABLE FINDINGS:  Left-sided chest wall intracardiac device is identified  Leads are intact  Cardiomediastinal silhouette appears unremarkable  Increased hazy airspace disease throughout the right middle and lower lung fields  No pneumothorax or pleural effusion  Osseous structures appear within normal limits for patient age       Impression: Increased hazy airspace disease throughout the right middle and lower lung fields consistent with pneumonia or asymmetric pulmonary edema Workstation performed: [id number]     Ct Chest Wo Contrast    Result Date: 9/12/2020  Narrative: CT CHEST WITHOUT IV CONTRAST INDICATION:   Pneumonia  COMPARISON:  CT chest 8/26/2019  TECHNIQUE: CT examination of the chest was performed without intravenous contrast   Axial, sagittal, and coronal 2D reformatted images were created from the source data and submitted for interpretation  Radiation dose length product (DLP) for this visit:  1407 75 mGy-cm   This examination, like all CT scans performed in the [facility], was performed utilizing techniques to minimize radiation dose exposure, including the use of iterative reconstruction and automated exposure control  FINDINGS: LUNGS:  Extensive groundglass and consolidative airspace disease throughout the right lung  Faint groundglass opacity seen throughout the left lung as well  Diffuse interlobar septal and peribronchovascular thickening     There is no tracheal or endobronchial lesion  PLEURA:  Unremarkable  HEART/GREAT VESSELS:  Atherosclerotic changes are noted in thoracic aorta and coronary arteries  MEDIASTINUM AND DEBRA:  Right hilar and mediastinal adenopathy  For instance a right hilar lymph node measures 1 6 cm (series 2, image 37) and right paratracheal lymph node measures 1 8 cm (series 2, 24)  Findings are likely reactive  CHEST WALL AND LOWER NECK:   Unremarkable  VISUALIZED STRUCTURES IN THE UPPER ABDOMEN:  Unremarkable  OSSEOUS STRUCTURES:  No acute fracture or destructive osseous lesion  Impression: Findings consistent with pneumonia mostly throughout the right lung  Mild CHF   Workstation performed: [id number]     Cardiac testing:   Results for orders placed during the hospital encounter of 09/11/20   Echo complete with contrast if indicated    Narrative [address]  [address] [facility] [place] [address]  [phone]    Transthoracic Echocardiogram  2D, M-mode, Doppler, and Color Doppler    Study date:  14-Sep-2020    Patient: Deondre Iqbal  MR number: [id number]  Account number: [de-identified]  : 1940  Age: [de-identified] years  Gender: Male  Status: Inpatient  Location: Bedside  Height: 73 in  Weight: 298 3 lb  BP: 120/ 68 mmHg    Diagnoses: I48 0 - Atrial fibrillation    Sonographer:  ROGER Parsons  Primary Physician:  La Shukla DO  Referring Physician:  Emily Moore MD  Group:  [facility]  Interpreting Physician:  Emily Moore MD    SUMMARY    LEFT VENTRICLE:  Systolic function was normal  Ejection fraction was estimated in the range of 55 % to 60 % to be 55 %  Although no diagnostic regional wall motion abnormality was identified, this possibility cannot be completely excluded on the basis of this study  Wall thickness was mildly to moderately increased  There was mild concentric hypertrophy  RIGHT VENTRICLE:  The ventricle was mildly dilated  LEFT ATRIUM:  The atrium was moderately to markedly dilated  RIGHT ATRIUM:  The atrium was mildly to moderately dilated  MITRAL VALVE:  There was moderate annular calcification  There was mild stenosis  Mean Gradient 6-7 mm of hg  There was mild regurgitation  AORTIC VALVE:  The valve was functionally bicuspid  Leaflets exhibited normal thickness and normal cuspal separation  Transaortic velocity was increased due to valvular stenosis  There was moderate to severe stenosis  Shima 1 0 cm2, peak gradient 65, mean 35 mm of hg    TRICUSPID VALVE:  There was mild to moderate regurgitation  Estimated peak PA pressure was 55 to 60 mmHg  The findings suggest moderate pulmonary hypertension  PULMONIC VALVE:  There was trace regurgitation  IVC, HEPATIC VEINS:  The inferior vena cava was mildly dilated  The respirophasic change in diameter was more than 50%  COMPARISONS:  Comparison was made with the previous study of 2019  Aortic stenosis has worsened  Pulmonary artery pressure has increased  HISTORY: PRIOR HISTORY: A  Flutter,A Fib ,chronic kidney disease,gout,heart failure,HTN,Pacemaker,pulmonary emphysema,sleep apnea,Angioplasty with stent placement,CAD,COPD,AAA  PROCEDURE: The procedure was performed at the bedside  This was a routine study  The transthoracic approach was used  The study included complete 2D imaging, M-mode, complete spectral Doppler, and color Doppler  The heart rate was 76 bpm,  at the start of the study  Images were obtained from the parasternal, apical, subcostal, and suprasternal notch acoustic windows  Intravenous contrast ( 0 4ml Definity in NSS) was administered to opacify the left ventricle and enhance  Doppler signals  Echocardiographic views were limited due to poor acoustic window availability, decreased penetration, and lung interference  This was a technically difficult study  LEFT VENTRICLE: Size was normal  Systolic function was normal  Ejection fraction was estimated in the range of 55 % to 60 % to be 55 %  Although no diagnostic regional wall motion abnormality was identified, this possibility cannot be  completely excluded on the basis of this study  Wall thickness was mildly to moderately increased  There was mild concentric hypertrophy  DOPPLER: The study was not technically sufficient to allow evaluation of LV diastolic function  RIGHT VENTRICLE: The ventricle was mildly dilated  Systolic function was normal     LEFT ATRIUM: The atrium was moderately to markedly dilated  RIGHT ATRIUM: The atrium was mildly to moderately dilated  A pacing wire was present  MITRAL VALVE: There was moderate annular calcification  There was mildly reduced leaflet separation  DOPPLER: Transmitral velocity was minimally increased  There was mild stenosis  Mean Gradient 6-7 mm of hg There was mild regurgitation  AORTIC VALVE: The valve was functionally bicuspid   Leaflets exhibited normal thickness and normal cuspal separation  DOPPLER: Transaortic velocity was increased due to valvular stenosis  There was moderate to severe stenosis  Shima 1 0 cm2,  peak gradient 65, mean 35 mm of hg There was no regurgitation  TRICUSPID VALVE: DOPPLER: There was mild to moderate regurgitation  Estimated peak PA pressure was 55 to 60 mmHg  The findings suggest moderate pulmonary hypertension  PULMONIC VALVE: DOPPLER: There was trace regurgitation  PERICARDIUM: There was no thickening or calcification  There was no pericardial effusion  AORTA: The root exhibited normal size  SYSTEMIC VEINS: IVC: The inferior vena cava was mildly dilated  The respirophasic change in diameter was more than 50%  SYSTEM MEASUREMENT TABLES    2D mode  AoR Diam 2D: 3 1 cm  LA Diam (2D): 5 3 cm  LA/Ao (2D): 1 71  FS (2D Teich): 25 7 %  IVSd (2D): 1 3 cm  LVDEV: 130 cmï¾³  LVESV: 64 7 cmï¾³  LVIDd(2D): 5 21 cm  LVISd (2D): 3 87 cm  LVOT Area 2D: 3 46 cmï¾²  LVPWd (2D): 1 33 cm  SV (Teich): 65 3 cmï¾³    Apical four chamber  LVEF A4C: 54 %    Unspecified Scan Mode  SHIMA Cont Eq (Peak Vitaly): 1 03 cmï¾²  LVOT Diam : 2 1 cm  LVOT Vmax: 1160 mm/s  LVOT Vmax; Mean: 1160 mm/s  Peak Grad ; Mean: 5 mm[Hg]  MV Peak A Vitaly: 434 mm/s  MV Peak E Vitaly  Mean: 1680 mm/s  MVA (PHT): 3 01 cmï¾²  PHT: 73 ms  Max P mm[Hg]  V Max: 2920 mm/s  Vmax: 3410 mm/s  TAPSE: 1 5 cm    [employer] Accredited [facility]    Prepared and electronically signed by    Jan Landon MD  Signed 14-Sep-2020 13:14:06         Leilani Montez        "This note has been constructed using a voice recognition system  Therefore there may be syntax, spelling, and/or grammatical errors   Please call if you have any questions  "

## 2020-09-16 ENCOUNTER — TRANSITIONAL CARE MANAGEMENT (OUTPATIENT)
Dept: FAMILY MEDICINE CLINIC | Facility: CLINIC | Age: 80
End: 2020-09-16

## 2020-09-17 ENCOUNTER — EPISODE CHANGES (OUTPATIENT)
Dept: CASE MANAGEMENT | Facility: OTHER | Age: 80
End: 2020-09-17

## 2020-09-17 LAB
BACTERIA BLD CULT: NORMAL
BACTERIA SPT RESP CULT: ABNORMAL
GRAM STN SPEC: ABNORMAL
GRAM STN SPEC: ABNORMAL

## 2020-09-18 ENCOUNTER — TELEPHONE (OUTPATIENT)
Dept: INPATIENT UNIT | Facility: HOSPITAL | Age: 80
End: 2020-09-18

## 2020-09-18 ENCOUNTER — PATIENT OUTREACH (OUTPATIENT)
Dept: CASE MANAGEMENT | Facility: OTHER | Age: 80
End: 2020-09-18

## 2020-09-18 LAB
BACTERIA BLD CULT: NORMAL
BACTERIA BLD CULT: NORMAL

## 2020-09-18 NOTE — PROGRESS NOTES
[facility] referral      Attempted outreach  No answer & the mailbox is not setup yet  Unable to leave a message

## 2020-09-18 NOTE — TELEPHONE ENCOUNTER
Callback completed for [facility]    Identified Risk for Readmission Per Risk Stratification: Low Risk    Current Disposition: Home    Discharge Date:9/16/20  Primary Learner:wife and pt  Communication:  PCP: tulio   Cardiologist: Pt  Stated having an appointment scheduled but RN did not ask name    Has patient seen or have their follow up appointment schedule with their PCP since discharge? Yes, has an appointment 9/21    Has patient seen or have been scheduled with their cardiologist since discharge? Yes, pt  has appointment 9/23    Does that patient have a means of transportation? Yes/No,explain: Yes      Have you picked up your new medications? Yes/No,explain: Yes    Symptoms reported? none    Daily Weights done? Yes/No,explain: Yes  Discharge weight: 300  Most recent weight:296    Following Diet: Foods to Limit/Avoid Salt    Yes/No,explain: Yes    Knows name of medication/water pill? Yes/No,explain: Yes    Understands Activity/Exercise      Yes/No,explain: Yes    Knows when to report symptoms? Yes/No,explain: Yes    Plan in place to call for worsening symptoms? Yes/No,explain: Yes        Narrative Summary:   Spoke with patient wife (patient was sleeping) and states that there is an appointment made for 9/21 PCP and 9/23 for cardiology  Weighting self at home  States the patient is down but doesnt know number  Both wife and pt working on diet together  Low salt low water intake  Is exercising daily, 1 hr on stationary bike  Also used inflatable compression to help with water loss

## 2020-09-20 PROBLEM — M79.672 PAIN IN BOTH FEET: Status: RESOLVED | Noted: 2018-01-31 | Resolved: 2020-09-20

## 2020-09-20 PROBLEM — R35.0 URINARY FREQUENCY: Status: RESOLVED | Noted: 2018-10-01 | Resolved: 2020-09-20

## 2020-09-20 PROBLEM — R07.9 CHEST PAIN: Status: RESOLVED | Noted: 2019-04-08 | Resolved: 2020-09-20

## 2020-09-20 PROBLEM — M21.969 ACQUIRED ANKLE/FOOT DEFORMITY: Status: RESOLVED | Noted: 2018-01-31 | Resolved: 2020-09-20

## 2020-09-20 PROBLEM — M77.42 METATARSALGIA OF BOTH FEET: Status: RESOLVED | Noted: 2019-01-03 | Resolved: 2020-09-20

## 2020-09-20 PROBLEM — M25.572 PAIN IN JOINTS OF BOTH FEET: Status: RESOLVED | Noted: 2018-01-31 | Resolved: 2020-09-20

## 2020-09-20 PROBLEM — Z48.89 ENCOUNTER FOR SURGICAL FOLLOW-UP CARE: Status: RESOLVED | Noted: 2019-10-30 | Resolved: 2020-09-20

## 2020-09-20 PROBLEM — B35.9 DERMATOPHYTOSIS: Status: RESOLVED | Noted: 2018-01-31 | Resolved: 2020-09-20

## 2020-09-20 PROBLEM — M79.671 PAIN IN BOTH FEET: Status: RESOLVED | Noted: 2018-01-31 | Resolved: 2020-09-20

## 2020-09-20 PROBLEM — M62.838 NECK MUSCLE SPASM: Status: RESOLVED | Noted: 2018-03-16 | Resolved: 2020-09-20

## 2020-09-20 PROBLEM — L02.13 CARBUNCLE OF NECK: Status: RESOLVED | Noted: 2018-11-02 | Resolved: 2020-09-20

## 2020-09-20 PROBLEM — M77.41 METATARSALGIA OF BOTH FEET: Status: RESOLVED | Noted: 2019-01-03 | Resolved: 2020-09-20

## 2020-09-20 PROBLEM — R78.81 GRAM-NEGATIVE BACTEREMIA: Status: RESOLVED | Noted: 2020-09-13 | Resolved: 2020-09-20

## 2020-09-20 PROBLEM — B35.1 ONYCHOMYCOSIS: Status: RESOLVED | Noted: 2018-01-31 | Resolved: 2020-09-20

## 2020-09-20 PROBLEM — L02.831: Status: RESOLVED | Noted: 2018-11-02 | Resolved: 2020-09-20

## 2020-09-20 PROBLEM — M25.571 PAIN IN JOINTS OF BOTH FEET: Status: RESOLVED | Noted: 2018-01-31 | Resolved: 2020-09-20

## 2020-09-20 PROBLEM — M54.50 LOW BACK PAIN: Status: RESOLVED | Noted: 2019-02-04 | Resolved: 2020-09-20

## 2020-09-21 ENCOUNTER — APPOINTMENT (OUTPATIENT)
Dept: RADIOLOGY | Facility: CLINIC | Age: 80
End: 2020-09-21
Payer: MEDICARE

## 2020-09-21 ENCOUNTER — OFFICE VISIT (OUTPATIENT)
Dept: FAMILY MEDICINE CLINIC | Facility: CLINIC | Age: 80
End: 2020-09-21
Payer: MEDICARE

## 2020-09-21 VITALS
TEMPERATURE: 98.7 F | DIASTOLIC BLOOD PRESSURE: 60 MMHG | RESPIRATION RATE: 16 BRPM | BODY MASS INDEX: 38.7 KG/M2 | HEIGHT: 73 IN | WEIGHT: 292 LBS | HEART RATE: 74 BPM | OXYGEN SATURATION: 94 % | SYSTOLIC BLOOD PRESSURE: 100 MMHG

## 2020-09-21 DIAGNOSIS — I25.10 CORONARY ARTERY ARTERIOSCLEROSIS: ICD-10-CM

## 2020-09-21 DIAGNOSIS — Z23 IMMUNIZATION DUE: ICD-10-CM

## 2020-09-21 DIAGNOSIS — I10 ESSENTIAL HYPERTENSION: ICD-10-CM

## 2020-09-21 DIAGNOSIS — F06.4 ANXIETY DISORDER DUE TO MEDICAL CONDITION: Primary | ICD-10-CM

## 2020-09-21 DIAGNOSIS — R05.9 COUGH: ICD-10-CM

## 2020-09-21 DIAGNOSIS — I12.9 BENIGN HYPERTENSION WITH CHRONIC KIDNEY DISEASE, STAGE III (HCC): ICD-10-CM

## 2020-09-21 DIAGNOSIS — D64.9 ANEMIA, UNSPECIFIED TYPE: ICD-10-CM

## 2020-09-21 DIAGNOSIS — B35.9 DERMATOPHYTOSIS: ICD-10-CM

## 2020-09-21 DIAGNOSIS — N20.0 NEPHROLITHIASIS: ICD-10-CM

## 2020-09-21 DIAGNOSIS — I25.10 CORONARY ARTERY DISEASE INVOLVING NATIVE CORONARY ARTERY OF NATIVE HEART WITHOUT ANGINA PECTORIS: Chronic | ICD-10-CM

## 2020-09-21 DIAGNOSIS — J44.0 CHRONIC OBSTRUCTIVE PULMONARY DISEASE WITH ACUTE LOWER RESPIRATORY INFECTION (HCC): ICD-10-CM

## 2020-09-21 DIAGNOSIS — N18.30 BENIGN HYPERTENSION WITH CHRONIC KIDNEY DISEASE, STAGE III (HCC): ICD-10-CM

## 2020-09-21 DIAGNOSIS — R60.0 BILATERAL LEG EDEMA: ICD-10-CM

## 2020-09-21 PROBLEM — J18.9 PNEUMONIA: Status: RESOLVED | Noted: 2019-06-23 | Resolved: 2020-09-21

## 2020-09-21 PROCEDURE — 71046 X-RAY EXAM CHEST 2 VIEWS: CPT

## 2020-09-21 PROCEDURE — 90662 IIV NO PRSV INCREASED AG IM: CPT

## 2020-09-21 PROCEDURE — 99496 TRANSJ CARE MGMT HIGH F2F 7D: CPT | Performed by: FAMILY MEDICINE

## 2020-09-21 PROCEDURE — G0008 ADMIN INFLUENZA VIRUS VAC: HCPCS

## 2020-09-21 RX ORDER — FELODIPINE 5 MG/1
5 TABLET, EXTENDED RELEASE ORAL DAILY
Qty: 90 TABLET | Refills: 1 | Status: SHIPPED | OUTPATIENT
Start: 2020-09-21 | End: 2021-05-05

## 2020-09-21 RX ORDER — CLOPIDOGREL BISULFATE 75 MG/1
75 TABLET ORAL DAILY
Qty: 90 TABLET | Refills: 1 | Status: SHIPPED | OUTPATIENT
Start: 2020-09-21 | End: 2021-05-06 | Stop reason: SDUPTHER

## 2020-09-21 RX ORDER — PAROXETINE HYDROCHLORIDE 20 MG/1
20 TABLET, FILM COATED ORAL DAILY
Qty: 90 TABLET | Refills: 1 | Status: SHIPPED | OUTPATIENT
Start: 2020-09-21 | End: 2021-02-10 | Stop reason: SDUPTHER

## 2020-09-21 RX ORDER — SIMVASTATIN 20 MG
20 TABLET ORAL
Qty: 90 TABLET | Refills: 1 | Status: SHIPPED | OUTPATIENT
Start: 2020-09-21 | End: 2021-05-05

## 2020-09-21 NOTE — PROGRESS NOTES
Assessment/Plan:    No problem-specific Assessment & Plan notes found for this encounter  S/p CHF, improved, f/u cardio/nephro for parameters to prevent readmission besides better diet discretion    CAD stable    CKD baseline    Anemia in range of baseline, no melena reported    New cough/mucus, cxr today    Anxiety, increase paxil 10mg to 20mg/d    Right leg tinea corporis, suggested lotrimin plus lidex ointment short term, could be eczema    Kidney stone advised, no symptoms     Diagnoses and all orders for this visit:    Anxiety 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  -     PARoxetine (PAXIL) 20 mg tablet; Take 1 tablet (20 mg total) by mouth daily    Essential hypertension  -     felodipine (PLENDIL) 5 mg 24 hr tablet; Take 1 tablet (5 mg total) by mouth daily    Coronary artery arteriosclerosis  -     simvastatin (ZOCOR) 20 mg tablet; Take 1 tablet (20 mg total) by mouth daily at bedtime  -     clopidogrel (PLAVIX) 75 mg tablet; Take 1 tablet (75 mg total) by mouth daily    Coronary artery disease involving native coronary artery of native heart without angina pectoris    Benign hypertension with chronic kidney disease, stage III (HCC)    Dermatophytosis  -     fluocinonide (LIDEX) 0 05 % cream; Apply topically 2 (two) times a day Prn leg rash, short term    Chronic obstructive pulmonary disease with acute lower respiratory infection (HCC)    Bilateral leg edema    Cough  -     XR chest pa & lateral; Future    Anemia, unspecified type    Immunization due  -     influenza vaccine, high-dose, PF 0 7 mL (FLUZONE HIGH-DOSE)    Nephrolithiasis          Return in about 3 months (around 12/21/2020)  Subjective:      Patient ID: Hosea Ware is a [de-identified] y o  male      Chief Complaint   Patient presents with    Transition of Care Management     Maty Corcoran       HPI  Was in [facility] reviewed    Weights:  299 at home on discharge  Now 200 today on same scale at home per pt    Wt gain preadmit  Diet indiscretion for days preceding  Pulse ox in 80s at home preadmit  SHAWNEE resolved    Compliant with JOVI    Cough today  Moist  Brown sputum   Using inhaler  Has appt next month with pulm    Right tineal rash, calf, recurrent , uses cream    Short tempered despite paxil 10mg  Tolerates meds  Wife requesting dosage increase    Seeing Dr Joe Araujo soon    The following portions of the patient's history were reviewed and updated as appropriate: allergies, current medications, past family history, past medical history, past social history, past surgical history and problem list     Review of Systems   Constitutional: Negative for fever  Respiratory: Positive for cough  Current Outpatient Medications   Medication Sig Dispense Refill    albuterol (VENTOLIN HFA) 90 mcg/act inhaler Inhale 2 puffs every 6 (six) hours as needed for wheezing 1 Inhaler 6    allopurinol (ZYLOPRIM) 300 mg tablet Take 1 tablet (300 mg total) by mouth daily 90 tablet 3    ascorbic acid (VITAMIN C) 500 mg tablet Take 500 mg by mouth daily   B Complex Vitamins (B COMPLEX 1 PO) Take 1 tablet by mouth daily   Biotin (BIOTIN 5000) 5 MG CAPS Take 1,000 mcg by mouth daily   calcium carbonate-vitamin D (OSCAL-D) 500 mg-200 units per tablet Take 2 tablets by mouth daily at bedtime   clopidogrel (PLAVIX) 75 mg tablet Take 1 tablet (75 mg total) by mouth daily 90 tablet 1    colchicine (COLCRYS) 0 6 mg tablet Take 1 tablet (0 6 mg total) by mouth 2 (two) times a day Take twice daily x 7 days as needed for gout 30 tablet 5    Cranberry 1000 MG CAPS Take 1 tablet by mouth 2 (two) times a day        cyanocobalamin 1000 MCG tablet Take 100 mcg by mouth daily      dextromethorphan-guaiFENesin (ROBITUSSIN DM)  mg/5 mL syrup Take 10 mL by mouth every 4 (four) hours as needed for cough 118 mL 0    felodipine (PLENDIL) 5 mg 24 hr tablet Take 1 tablet (5 mg total) by mouth daily 90 tablet 1    finasteride (PROSCAR) 5 mg tablet Take 1 tablet (5 mg total) by mouth daily 90 tablet 3    Flaxseed, Linseed, (EQL FLAX SEED OIL) 1000 MG CAPS Take by mouth daily   fluticasone-vilanterol (BREO ELLIPTA) 100-25 mcg/inh inhaler Inhale 1 puff daily Rinse mouth after use  3 Inhaler 3    furosemide (LASIX) 40 mg tablet TAKE ONE TABLET(40 MG) IN AM AND HALF TABLET (20 MG) IN  tablet 3    glucosamine-chondroitin 500-400 MG tablet Take 1 tablet by mouth 2 (two) times a day   ipratropium-albuterol (DUO-NEB) 0 5-2 5 mg/3 mL nebulizer solution USE 1 VIAL IN NEBULIZER 3 TIMES DAILY 90 vial 11    lactase (LACTAID) 3,000 units tablet Take 3,000 Units by mouth as needed   Omega-3 Fatty Acids (FISH OIL) 1,000 mg Take 1,000 mg by mouth 2 (two) times a day   PARoxetine (PAXIL) 20 mg tablet Take 1 tablet (20 mg total) by mouth daily 90 tablet 1    psyllium (METAMUCIL) 58 6 % powder Take 1 packet by mouth daily as needed       rivaroxaban (XARELTO) 15 mg tablet Take 1 tablet (15 mg total) by mouth daily with breakfast (Patient taking differently: Take 20 mg by mouth daily with breakfast )      simvastatin (ZOCOR) 20 mg tablet Take 1 tablet (20 mg total) by mouth daily at bedtime 90 tablet 1    traMADol (ULTRAM) 50 mg tablet Take 50 mg by mouth every 6 (six) hours as needed for moderate pain      fluocinonide (LIDEX) 0 05 % cream Apply topically 2 (two) times a day Prn leg rash, short term 120 g 2     No current facility-administered medications for this visit  Objective:    /60   Pulse 74   Temp 98 7 °F (37 1 °C)   Resp 16   Ht 6' 1" (1 854 m)   Wt 132 kg (292 lb)   SpO2 94%   BMI 38 52 kg/m²        Physical Exam  Vitals signs and nursing note reviewed  Constitutional:       Appearance: Normal appearance  He is well-developed  He is obese  He is not ill-appearing or diaphoretic  HENT:      Head: Normocephalic  Right Ear: Tympanic membrane and external ear normal  There is no impacted cerumen        Left Ear: Tympanic membrane and external ear normal  There is no impacted cerumen  Nose: No rhinorrhea  Mouth/Throat:      Pharynx: No posterior oropharyngeal erythema  Eyes:      General: No scleral icterus  Conjunctiva/sclera: Conjunctivae normal    Neck:      Musculoskeletal: Neck supple  Cardiovascular:      Rate and Rhythm: Normal rate and regular rhythm  Pulmonary:      Effort: Pulmonary effort is normal  No respiratory distress  Breath sounds: No rales  Comments: Some rhonchi midchest  Abdominal:      Palpations: Abdomen is soft  There is no mass  Tenderness: There is no abdominal tenderness  Musculoskeletal:         General: No deformity  Right lower leg: Edema present  Left lower leg: Edema present  Comments: Mild b/l pretibial edema   Skin:     General: Skin is warm and dry  Coloration: Skin is not jaundiced or pale  Findings: Rash present  Comments: Right medial proximal tibia tineal reash   Neurological:      Mental Status: He is alert  Psychiatric:         Behavior: Behavior normal          Thought Content: Thought content normal          Judgment: Judgment normal          BMI Counseling: Body mass index is 38 52 kg/m²  The BMI is above normal  Nutrition recommendations include decreasing portion sizes and moderation in carbohydrate intake  Exercise recommendations include exercising 3-5 times per week  No pharmacotherapy was ordered                 Faiza Dietrich DO

## 2020-09-22 ENCOUNTER — PATIENT OUTREACH (OUTPATIENT)
Dept: CASE MANAGEMENT | Facility: OTHER | Age: 80
End: 2020-09-22

## 2020-09-25 ENCOUNTER — TELEPHONE (OUTPATIENT)
Dept: INPATIENT UNIT | Facility: HOSPITAL | Age: 80
End: 2020-09-25

## 2020-09-25 NOTE — TELEPHONE ENCOUNTER
Spoke with patient directly  He was able to see his PCP this past Tuesday and saw his cardiologist this [address]  He stated that he "couldn't feel better" and was excited about his progress  He states eating a low sodium diet and continually being active on his stationary bike at home  He states that he is weighing himself at home and is back to his base weight

## 2020-09-29 DIAGNOSIS — J41.8 MIXED SIMPLE AND MUCOPURULENT CHRONIC BRONCHITIS (HCC): ICD-10-CM

## 2020-09-29 DIAGNOSIS — R60.0 BILATERAL LEG EDEMA: Primary | ICD-10-CM

## 2020-09-29 DIAGNOSIS — J44.9 CHRONIC OBSTRUCTIVE PULMONARY DISEASE, UNSPECIFIED COPD TYPE (HCC): ICD-10-CM

## 2020-09-29 RX ORDER — FUROSEMIDE 20 MG/1
20 TABLET ORAL DAILY
COMMUNITY
End: 2020-09-29 | Stop reason: SDUPTHER

## 2020-09-29 RX ORDER — FLUTICASONE FUROATE AND VILANTEROL 100; 25 UG/1; UG/1
1 POWDER RESPIRATORY (INHALATION) DAILY
Qty: 3 INHALER | Refills: 3 | Status: SHIPPED | OUTPATIENT
Start: 2020-09-29 | End: 2020-11-30 | Stop reason: SDUPTHER

## 2020-09-29 RX ORDER — ALBUTEROL SULFATE 90 UG/1
2 AEROSOL, METERED RESPIRATORY (INHALATION) EVERY 6 HOURS PRN
Qty: 3 INHALER | Refills: 3 | Status: SHIPPED | OUTPATIENT
Start: 2020-09-29

## 2020-09-30 ENCOUNTER — PATIENT OUTREACH (OUTPATIENT)
Dept: CASE MANAGEMENT | Facility: OTHER | Age: 80
End: 2020-09-30

## 2020-09-30 NOTE — PROGRESS NOTES
Patient has all of his meds & is taking them as ordered  He has adequate insurance  He followed up with his PCP & cardio was last Wednesday, no new orders  He states his daughters cook for him following a low sodium diet  They carefully monitor his fluid intake & output  He is doing daily weights  Educated & reviewed the S&S of fluid overload & COPD exacerbation  He is able to verbalize when to contact Dr Rj Blanca or his PCP  He states his daughters & wife are carefully managing his health & he does not need additional outreach at this time  He did take my name & number & agreed to call if questions or needs

## 2020-10-01 RX ORDER — FUROSEMIDE 20 MG/1
20 TABLET ORAL DAILY
Qty: 90 TABLET | Refills: 3 | Status: SHIPPED | OUTPATIENT
Start: 2020-10-01 | End: 2020-11-13

## 2020-10-06 ENCOUNTER — TELEPHONE (OUTPATIENT)
Dept: INPATIENT UNIT | Facility: HOSPITAL | Age: 80
End: 2020-10-06

## 2020-10-30 ENCOUNTER — DOCUMENTATION (OUTPATIENT)
Dept: NEPHROLOGY | Facility: HOSPITAL | Age: 80
End: 2020-10-30

## 2020-10-30 LAB
EXT DIFF-ABS BASOPHILS: 0.1
EXT DIFF-ABS EOSINOPHILS: 0.3
EXT DIFF-ABS LYMPHOCYTES: 1.3
EXT DIFF-ABS MONOCYTES: 0.5
EXT DIFF-ABS NEUTROPHILS: 4.1
EXT GLUCOSE BLD: 122
EXTERNAL BUN: 29
EXTERNAL CALCIUM: 9.3
EXTERNAL CHLORIDE: 103
EXTERNAL CO2: 25
EXTERNAL CREATININE: 1.55
EXTERNAL HEMATOCRIT: 39 %
EXTERNAL HEMOGLOBIN: 12.8 G/DL
EXTERNAL MAGNESIUM: 2.3
EXTERNAL MCV: 91
EXTERNAL PHOSPHORUS: 3.3
EXTERNAL PLATELET COUNT: 187 K/ΜL
EXTERNAL POTASSIUM: 4.3
EXTERNAL PTH: 25
EXTERNAL RBC: 4.3
EXTERNAL RDW: 14.1
EXTERNAL SODIUM: 140
EXTERNAL URIC ACID: 6.9
EXTERNAL VITAMIN D,25: 38.2
EXTERNAL WBC: 6.2

## 2020-11-04 LAB
BACTERIA BLD CULT: NORMAL
BACTERIA BLD CULT: NORMAL
Lab: NORMAL
Lab: NORMAL

## 2020-11-05 ENCOUNTER — TELEPHONE (OUTPATIENT)
Dept: FAMILY MEDICINE CLINIC | Facility: CLINIC | Age: 80
End: 2020-11-05

## 2020-11-05 ENCOUNTER — OFFICE VISIT (OUTPATIENT)
Dept: PODIATRY | Facility: CLINIC | Age: 80
End: 2020-11-05
Payer: MEDICARE

## 2020-11-05 VITALS — HEIGHT: 73 IN | WEIGHT: 292 LBS | RESPIRATION RATE: 17 BRPM | BODY MASS INDEX: 38.7 KG/M2

## 2020-11-05 DIAGNOSIS — B35.9 DERMATOPHYTOSIS: ICD-10-CM

## 2020-11-05 DIAGNOSIS — M54.16 RADICULOPATHY OF LUMBAR REGION: ICD-10-CM

## 2020-11-05 DIAGNOSIS — Z79.2 PROPHYLACTIC ANTIBIOTIC: ICD-10-CM

## 2020-11-05 DIAGNOSIS — M77.42 METATARSALGIA OF BOTH FEET: Primary | ICD-10-CM

## 2020-11-05 DIAGNOSIS — B35.1 ONYCHOMYCOSIS: ICD-10-CM

## 2020-11-05 DIAGNOSIS — I70.209 PERIPHERAL ARTERIOSCLEROSIS (HCC): ICD-10-CM

## 2020-11-05 DIAGNOSIS — M77.41 METATARSALGIA OF BOTH FEET: Primary | ICD-10-CM

## 2020-11-05 PROCEDURE — 99213 OFFICE O/P EST LOW 20 MIN: CPT | Performed by: PODIATRIST

## 2020-11-05 RX ORDER — AMOXICILLIN 500 MG/1
CAPSULE ORAL
Qty: 8 CAPSULE | Refills: 1 | Status: SHIPPED | OUTPATIENT
Start: 2020-11-05 | End: 2020-11-20

## 2020-11-13 ENCOUNTER — OFFICE VISIT (OUTPATIENT)
Dept: NEPHROLOGY | Facility: CLINIC | Age: 80
End: 2020-11-13
Payer: MEDICARE

## 2020-11-13 VITALS
HEART RATE: 76 BPM | BODY MASS INDEX: 38.73 KG/M2 | SYSTOLIC BLOOD PRESSURE: 120 MMHG | DIASTOLIC BLOOD PRESSURE: 64 MMHG | HEIGHT: 73 IN | TEMPERATURE: 97.2 F | WEIGHT: 292.2 LBS

## 2020-11-13 DIAGNOSIS — E55.9 VITAMIN D INSUFFICIENCY: ICD-10-CM

## 2020-11-13 DIAGNOSIS — I12.9 BENIGN HYPERTENSION WITH CHRONIC KIDNEY DISEASE, STAGE III (HCC): ICD-10-CM

## 2020-11-13 DIAGNOSIS — R80.1 PERSISTENT PROTEINURIA: ICD-10-CM

## 2020-11-13 DIAGNOSIS — N18.30 BENIGN HYPERTENSION WITH CHRONIC KIDNEY DISEASE, STAGE III (HCC): ICD-10-CM

## 2020-11-13 DIAGNOSIS — R31.9 HEMATURIA, UNSPECIFIED TYPE: ICD-10-CM

## 2020-11-13 DIAGNOSIS — R60.0 BILATERAL LEG EDEMA: ICD-10-CM

## 2020-11-13 DIAGNOSIS — N18.31 STAGE 3A CHRONIC KIDNEY DISEASE (HCC): Primary | ICD-10-CM

## 2020-11-13 PROCEDURE — 99214 OFFICE O/P EST MOD 30 MIN: CPT | Performed by: PHYSICIAN ASSISTANT

## 2020-11-19 ENCOUNTER — OFFICE VISIT (OUTPATIENT)
Dept: PULMONOLOGY | Facility: MEDICAL CENTER | Age: 80
End: 2020-11-19
Payer: MEDICARE

## 2020-11-19 VITALS
DIASTOLIC BLOOD PRESSURE: 70 MMHG | HEART RATE: 82 BPM | TEMPERATURE: 97.6 F | SYSTOLIC BLOOD PRESSURE: 126 MMHG | WEIGHT: 292 LBS | RESPIRATION RATE: 22 BRPM | OXYGEN SATURATION: 95 % | BODY MASS INDEX: 38.7 KG/M2 | HEIGHT: 73 IN

## 2020-11-19 DIAGNOSIS — R06.00 DYSPNEA ON EXERTION: Primary | ICD-10-CM

## 2020-11-19 PROCEDURE — 99214 OFFICE O/P EST MOD 30 MIN: CPT | Performed by: PHYSICIAN ASSISTANT

## 2020-11-30 DIAGNOSIS — J41.8 MIXED SIMPLE AND MUCOPURULENT CHRONIC BRONCHITIS (HCC): ICD-10-CM

## 2020-11-30 RX ORDER — FLUTICASONE FUROATE AND VILANTEROL 100; 25 UG/1; UG/1
1 POWDER RESPIRATORY (INHALATION) DAILY
Qty: 3 INHALER | Refills: 3 | Status: SHIPPED | OUTPATIENT
Start: 2020-11-30 | End: 2020-12-01 | Stop reason: SDUPTHER

## 2020-12-01 DIAGNOSIS — J41.8 MIXED SIMPLE AND MUCOPURULENT CHRONIC BRONCHITIS (HCC): ICD-10-CM

## 2020-12-01 RX ORDER — FLUTICASONE FUROATE AND VILANTEROL 100; 25 UG/1; UG/1
1 POWDER RESPIRATORY (INHALATION) DAILY
Qty: 3 INHALER | Refills: 3 | Status: SHIPPED | OUTPATIENT
Start: 2020-12-01 | End: 2021-03-02 | Stop reason: ALTCHOICE

## 2020-12-07 ENCOUNTER — TELEPHONE (OUTPATIENT)
Dept: PULMONOLOGY | Facility: MEDICAL CENTER | Age: 80
End: 2020-12-07

## 2020-12-08 DIAGNOSIS — J43.2 CENTRILOBULAR EMPHYSEMA (HCC): Primary | ICD-10-CM

## 2020-12-14 ENCOUNTER — PATIENT OUTREACH (OUTPATIENT)
Dept: CASE MANAGEMENT | Facility: OTHER | Age: 80
End: 2020-12-14

## 2020-12-15 ENCOUNTER — APPOINTMENT (OUTPATIENT)
Dept: RADIOLOGY | Facility: CLINIC | Age: 80
End: 2020-12-15
Payer: MEDICARE

## 2020-12-15 DIAGNOSIS — R06.00 DYSPNEA ON EXERTION: ICD-10-CM

## 2020-12-15 PROCEDURE — 71046 X-RAY EXAM CHEST 2 VIEWS: CPT

## 2020-12-18 DIAGNOSIS — R60.0 BILATERAL LEG EDEMA: Primary | ICD-10-CM

## 2020-12-18 RX ORDER — FUROSEMIDE 20 MG/1
20 TABLET ORAL DAILY
COMMUNITY
End: 2020-12-18 | Stop reason: SDUPTHER

## 2020-12-20 RX ORDER — FUROSEMIDE 20 MG/1
TABLET ORAL
Qty: 270 TABLET | Refills: 3 | Status: ON HOLD | OUTPATIENT
Start: 2020-12-20 | End: 2021-11-24 | Stop reason: SDUPTHER

## 2020-12-23 ENCOUNTER — HOSPITAL ENCOUNTER (OUTPATIENT)
Dept: PULMONOLOGY | Facility: HOSPITAL | Age: 80
Discharge: HOME | End: 2020-12-23
Payer: MEDICARE

## 2020-12-23 DIAGNOSIS — R06.00 DYSPNEA ON EXERTION: ICD-10-CM

## 2020-12-23 PROCEDURE — 94729 DIFFUSING CAPACITY: CPT

## 2020-12-23 PROCEDURE — 94760 N-INVAS EAR/PLS OXIMETRY 1: CPT

## 2020-12-23 PROCEDURE — 94726 PLETHYSMOGRAPHY LUNG VOLUMES: CPT

## 2020-12-23 PROCEDURE — 94729 DIFFUSING CAPACITY: CPT | Performed by: INTERNAL MEDICINE

## 2020-12-23 PROCEDURE — 94060 EVALUATION OF WHEEZING: CPT | Performed by: INTERNAL MEDICINE

## 2020-12-23 PROCEDURE — 94060 EVALUATION OF WHEEZING: CPT

## 2020-12-23 PROCEDURE — 94726 PLETHYSMOGRAPHY LUNG VOLUMES: CPT | Performed by: INTERNAL MEDICINE

## 2020-12-31 ENCOUNTER — OFFICE VISIT (OUTPATIENT)
Dept: PODIATRY | Facility: CLINIC | Age: 80
End: 2020-12-31
Payer: MEDICARE

## 2020-12-31 VITALS
DIASTOLIC BLOOD PRESSURE: 77 MMHG | SYSTOLIC BLOOD PRESSURE: 137 MMHG | HEIGHT: 73 IN | BODY MASS INDEX: 38.7 KG/M2 | RESPIRATION RATE: 17 BRPM | WEIGHT: 292 LBS | HEART RATE: 72 BPM

## 2020-12-31 DIAGNOSIS — B35.1 ONYCHOMYCOSIS: ICD-10-CM

## 2020-12-31 DIAGNOSIS — M77.42 METATARSALGIA OF BOTH FEET: Primary | ICD-10-CM

## 2020-12-31 DIAGNOSIS — R26.81 GAIT INSTABILITY: ICD-10-CM

## 2020-12-31 DIAGNOSIS — B35.9 DERMATOPHYTOSIS: ICD-10-CM

## 2020-12-31 DIAGNOSIS — M54.16 RADICULOPATHY OF LUMBAR REGION: ICD-10-CM

## 2020-12-31 DIAGNOSIS — I70.209 PERIPHERAL ARTERIOSCLEROSIS (HCC): ICD-10-CM

## 2020-12-31 DIAGNOSIS — M77.41 METATARSALGIA OF BOTH FEET: Primary | ICD-10-CM

## 2020-12-31 PROCEDURE — 99213 OFFICE O/P EST LOW 20 MIN: CPT | Performed by: PODIATRIST

## 2021-01-11 ENCOUNTER — HOSPITAL ENCOUNTER (OUTPATIENT)
Dept: RADIOLOGY | Facility: HOSPITAL | Age: 81
Discharge: HOME | End: 2021-01-11
Attending: PODIATRIST
Payer: MEDICARE

## 2021-01-11 DIAGNOSIS — I70.209 PERIPHERAL ARTERIOSCLEROSIS (HCC): ICD-10-CM

## 2021-01-11 PROCEDURE — 93923 UPR/LXTR ART STDY 3+ LVLS: CPT

## 2021-01-11 PROCEDURE — 93922 UPR/L XTREMITY ART 2 LEVELS: CPT | Performed by: SURGERY

## 2021-01-11 PROCEDURE — 93925 LOWER EXTREMITY STUDY: CPT | Performed by: SURGERY

## 2021-01-11 PROCEDURE — 93925 LOWER EXTREMITY STUDY: CPT

## 2021-02-01 ENCOUNTER — TELEPHONE (OUTPATIENT)
Dept: OTHER | Facility: HOSPITAL | Age: 81
End: 2021-02-01

## 2021-02-01 NOTE — TELEPHONE ENCOUNTER
Sent [employer] email explaining results as follows and to continue therapy as prior initiated w/ Meena Rees  I discussed the results of the following tests with the patient in entirety  Follow up scheduling recommended    Requesting provider: Maco Morrison  Diagnosis:  Dyspnea on exertion        Results:  Patient gave good effort and cooperation  The testing met ATS Standards for Acceptability and Repeatability        Spirometry:  FEV1/FVC Ratio is 60 %  FEV1 is 1 92 L/63 % of predicted  FVC is 3 19 L/ 76 % of predicted  No significant bronchodilator response     Lung volumes:  RV 3 49L/125 % of predicted, TLC 6 43 L/86 % of predicted, RV/TLC ratio 54 %     Diffusing capacity:  45 % of predicted     IMPRESSION:  1  Moderate obstructive air flow limitation, mildly decreased vital capacity secondary to air trapping  2  No significant bronchodilator response  3  Normal lung volumes but increased RV/TLC ratio indication air trapping   4  Moderately impaired diffusion capacity  5   Obstructive flow volume loop        Gianni Tinsley MD

## 2021-02-08 ENCOUNTER — TELEPHONE (OUTPATIENT)
Dept: PHYSICAL THERAPY | Facility: CLINIC | Age: 81
End: 2021-02-08

## 2021-02-08 ENCOUNTER — EVALUATION (OUTPATIENT)
Dept: PHYSICAL THERAPY | Facility: CLINIC | Age: 81
End: 2021-02-08
Payer: MEDICARE

## 2021-02-08 DIAGNOSIS — R26.81 GAIT INSTABILITY: Primary | ICD-10-CM

## 2021-02-08 PROCEDURE — 97163 PT EVAL HIGH COMPLEX 45 MIN: CPT

## 2021-02-08 NOTE — PROGRESS NOTES
PT Evaluation     Today's date: 3/22/2021  Patient name: Randa Luciano  : 1940  MRN: [id number]  Referring provider: So Wilson DPM  Dx:   Encounter Diagnosis     ICD-10-CM    1  Gait instability  R26 [id number] Ambulatory referral to Physical Therapy     PT plan of care cert/re-cert       Start Time: 0900  Stop Time: 0945  Total time in clinic (min): 45 minutes    Assessment  Assessment details: Patient is [de-identified]year old male presenting to skilled PT due to deconditioning and fatigue due to recent increase in falls and LOB  Patient reports near falls occurring 2-3 times a Week  Patient displays Abnormal muscle strength with overall grade of 4/5  Vestibular component of balance decreased per objective measures  Based on outcome measures she is deemed high fall risk per TUG, 10 meter walk test, [place], mCTSIB with eyes closed  Patient subjective report notes the following functional limitation with ambulation, stairs, floor transfers  Patient demonstrates fear of falling and pre-existing case of knee pain  Patient will benefit from skilled PT to address noted impairments and functional limitations they are causing with overall goal to return patient to highest level possible with reduced risk for falls  Impairments: abnormal coordination, abnormal gait, abnormal muscle firing, abnormal muscle tone, abnormal or restricted ROM, abnormal movement, activity intolerance, difficulty understanding, impaired balance, impaired physical strength, lacks appropriate home exercise program, pain with function, safety issue, weight-bearing intolerance, poor posture  and poor body mechanics    Symptom irritability: lowUnderstanding of Dx/Px/POC: excellent   Prognosis: good    Goals  Goals  STG 30 days    1  Patient will improve static balance with feet together Eyes Closed Firm surface to 30 seconds indicating reduction in fall risk  2   Patient will display 2 3  second improvement with overall score of less than 12 seconds  with TUG test or lower with noted improvement being Minimal Detectable Change pre current research standards with fall risk  3  Patient will achieve 45/56 MOFFETT score with minimal improve by 6 points or more demonstrating Minimal Detectable change per current research standards for this objective test which assess fall risk  4  Patient will increase gait speed to > 0 70 m/s with 10 Meter Walk test, demonstrating Minimal Detectable change per current research standards for this objective test which assess fall risk  5 Patient will perform  5 x sit to stand test with overall reduction by seconds to less than 13 seconds indicating improvement with functional endurance  6  Patient will increase ABC score to 60% or higher to reduce fall risk and improve balance confidence  LT days   1  Patient will score low risk for falls with 3/4 fall risk measures   2  Patient will be able to perform floor transfer without physical assistance   3  Patient will be able to carry objects without loss of balance  4  Patient will be able to ambulate outdoors without any loss of balance  5  Patient will increase ABC score to 80% confidence to reduce fall risk and improve balance confidence  6  Patient will increase gait speed to 1 1 m/s or higher to reduce fall risk       Cut off score   All date taken from [facility] or Rehab Measures    MOFFETT test: 46/56                                                         5 x STS Test:  MDC: 6 points                                                              MDC: 2 3 seconds   age norms                                                                    Age Norms   61-76 year old = M: 54, F: 52                                        68-69 year old: 11 4 seconds   66-77 year old = M 47,  F: 52                                        68-69 year old: 12 6 seconds    80-80 year old = M46,   F: 48                                       80-80 year old: 14 8 seconds     TUG test: 10 Meter Walk Test:  MDC: 4 14 seconds                                                      MDC:  59 ft/sec  Cut off score for Falls                                                  Age Norms  > 13 5 seconds community dwelling adults                20-29; M: 4 56 ft/sec F: 4 62 ft/sec  > 32 2 Frail Elderly                                                     30-39: M 4 76 ft/sec  F: 4 68 ft/sec                                                                                       40-49: M: 4 79 ft/sec  F: 4 62 ft/sec  6 Minute Walk Test                                                      50-59: M: 4 76 ft/sec  F: 4 56 ft/sec  MDC: 190 feet                                                              60-69: M: 4 56 ft/sec  F: 4 26 ft/sec  Age Norms                                                                   70-+    M: 4 36 ft/sec  F: 4 16 ft/sec  60-69:    M: 1876 F: 0525  78-60:    M: 1729 F: 4468  80-89 +: M: 4054 F; 1286     Plan  Planned modality interventions: biofeedback, electrical stimulation/Russian stimulation, TENS, thermotherapy: hydrocollator packs and cryotherapy  Planned therapy interventions: abdominal trunk stabilization, activity modification, ADL retraining, balance, ADL training, IADL retraining, joint mobilization, manual therapy, motor coordination training, muscle pump exercises, neuromuscular re-education, behavior modification, body mechanics training, canalith repositioning, patient education, postural training, compression, coordination, therapeutic exercise, stretching, strengthening, therapeutic activities, functional ROM exercises, flexibility, fine motor coordination training, gait training, graded activity, graded exercise, graded motor, home exercise program, therapeutic training and transfer training  Frequency: 2x week  Duration in weeks: 12  Plan of Care beginning date: 2/8/2021  Plan of Care expiration date: 5/3/2021  Treatment plan discussed with: patient and family        Subjective Evaluation    History of Present Illness  Date of onset: 2021  Mechanism of injury: Patient is a [de-identified]year old male reporting to skilled PT with reports of balance deficits  Patient notes increases in losses of balance in the past few months, notes one fall when he fell on his shoulder  Patient   Recently lost over 100 lbs, notes challenges with low back pain as well  Patient notes bilateral feet pain, patient challenged with overall balance and limits of stability, notes loss of balance in the dark  Patient notes that he has discomfort in his feet  Patient notes that he has some low back pain today due to plowing  Not a recurrent problem   Quality of life: excellent    Pain  Current pain ratin  At best pain ratin  At worst pain ratin  Quality: dull ache  Relieving factors: change in position  Aggravating factors: walking  Progression: no change    Social Support  Steps to enter house: yes  Stairs in house: yes   Lives with: spouse    Employment status: working  Hand dominance: right      Diagnostic Tests  No diagnostic tests performed        Objective           Precautions:  Past Medical History:   Diagnosis Date    Arthritis     Atrial flutter ([facility] )     Chronic kidney disease     stage 3    Coronary artery disease     2 stents    Fluid retention     Gout     [facility]     pacemaker    Hypertension     Pacemaker     Pulmonary emphysema ([facility] )     Radiculopathy     last assessed 16     Shortness of breath     exertion    Sleep apnea     c pap           Outcome Measures 21            5x sit to stand 12 63 seconds no UE            30 second sit to stand 12 no UE            TUG 9 66 sec            Gait Speed   84 m/s            2 minute walk test 150 feet            [place] 39/56            mCTSIB FTEO- 30 sec  FTEC- 30 sec   FTEO- 30 sec FTEC-7 5 sec            2 minute walk teest 150 feet Ther Ex                                                                                                                     Ther Activity                                       Gait Training                                       Modalities

## 2021-02-10 DIAGNOSIS — F06.4 ANXIETY DISORDER DUE TO MEDICAL CONDITION: ICD-10-CM

## 2021-02-10 RX ORDER — PAROXETINE HYDROCHLORIDE 20 MG/1
20 TABLET, FILM COATED ORAL DAILY
Qty: 90 TABLET | Refills: 1 | Status: SHIPPED | OUTPATIENT
Start: 2021-02-10 | End: 2021-08-17

## 2021-02-11 DIAGNOSIS — Z23 ENCOUNTER FOR IMMUNIZATION: ICD-10-CM

## 2021-02-12 ENCOUNTER — APPOINTMENT (OUTPATIENT)
Dept: RADIOLOGY | Facility: CLINIC | Age: 81
End: 2021-02-12
Payer: MEDICARE

## 2021-02-12 ENCOUNTER — OFFICE VISIT (OUTPATIENT)
Dept: PHYSICAL THERAPY | Facility: CLINIC | Age: 81
End: 2021-02-12
Payer: MEDICARE

## 2021-02-12 ENCOUNTER — OFFICE VISIT (OUTPATIENT)
Dept: OBGYN CLINIC | Facility: CLINIC | Age: 81
End: 2021-02-12
Payer: MEDICARE

## 2021-02-12 VITALS
HEIGHT: 73 IN | BODY MASS INDEX: 23.72 KG/M2 | HEART RATE: 72 BPM | DIASTOLIC BLOOD PRESSURE: 65 MMHG | SYSTOLIC BLOOD PRESSURE: 108 MMHG | WEIGHT: 179 LBS

## 2021-02-12 DIAGNOSIS — M25.512 BILATERAL SHOULDER PAIN, UNSPECIFIED CHRONICITY: ICD-10-CM

## 2021-02-12 DIAGNOSIS — M75.41 IMPINGEMENT SYNDROME OF RIGHT SHOULDER: ICD-10-CM

## 2021-02-12 DIAGNOSIS — M19.011 PRIMARY OSTEOARTHRITIS OF BOTH SHOULDERS: ICD-10-CM

## 2021-02-12 DIAGNOSIS — R26.81 GAIT INSTABILITY: Primary | ICD-10-CM

## 2021-02-12 DIAGNOSIS — M75.82 ROTATOR CUFF TENDINITIS, LEFT: ICD-10-CM

## 2021-02-12 DIAGNOSIS — M75.42 IMPINGEMENT SYNDROME OF LEFT SHOULDER: ICD-10-CM

## 2021-02-12 DIAGNOSIS — M12.811 ROTATOR CUFF TEAR ARTHROPATHY, RIGHT: Primary | ICD-10-CM

## 2021-02-12 DIAGNOSIS — M25.511 BILATERAL SHOULDER PAIN, UNSPECIFIED CHRONICITY: ICD-10-CM

## 2021-02-12 DIAGNOSIS — M75.101 ROTATOR CUFF TEAR ARTHROPATHY, RIGHT: Primary | ICD-10-CM

## 2021-02-12 DIAGNOSIS — M19.012 PRIMARY OSTEOARTHRITIS OF BOTH SHOULDERS: ICD-10-CM

## 2021-02-12 PROCEDURE — 97112 NEUROMUSCULAR REEDUCATION: CPT

## 2021-02-12 PROCEDURE — 97110 THERAPEUTIC EXERCISES: CPT

## 2021-02-12 PROCEDURE — 99214 OFFICE O/P EST MOD 30 MIN: CPT | Performed by: ORTHOPAEDIC SURGERY

## 2021-02-12 PROCEDURE — 73030 X-RAY EXAM OF SHOULDER: CPT

## 2021-02-12 PROCEDURE — 97530 THERAPEUTIC ACTIVITIES: CPT

## 2021-02-12 PROCEDURE — 20610 DRAIN/INJ JOINT/BURSA W/O US: CPT | Performed by: ORTHOPAEDIC SURGERY

## 2021-02-12 RX ORDER — LIDOCAINE HYDROCHLORIDE 10 MG/ML
4 INJECTION, SOLUTION INFILTRATION; PERINEURAL
Status: COMPLETED | OUTPATIENT
Start: 2021-02-12 | End: 2021-02-12

## 2021-02-12 RX ORDER — FUROSEMIDE 40 MG/1
TABLET ORAL
COMMUNITY
Start: 2021-01-06 | End: 2021-02-16

## 2021-02-12 RX ORDER — DEXAMETHASONE SODIUM PHOSPHATE 100 MG/10ML
40 INJECTION INTRAMUSCULAR; INTRAVENOUS
Status: COMPLETED | OUTPATIENT
Start: 2021-02-12 | End: 2021-02-12

## 2021-02-12 RX ADMIN — LIDOCAINE HYDROCHLORIDE 4 ML: 10 INJECTION, SOLUTION INFILTRATION; PERINEURAL at 10:39

## 2021-02-12 RX ADMIN — DEXAMETHASONE SODIUM PHOSPHATE 40 MG: 100 INJECTION INTRAMUSCULAR; INTRAVENOUS at 10:39

## 2021-02-12 NOTE — PATIENT INSTRUCTIONS
Exercises for Shoulder Abduction and Adduction   WHAT YOU NEED TO KNOW:   Shoulder abduction and adduction exercises work the muscles at the back of your shoulder and your upper back  DISCHARGE INSTRUCTIONS:   Contact your healthcare provider if:   · You have sharp or worsening pain during exercise or at rest     · You have questions or concerns about your shoulder exercises  Before you exercise:  Warm up and stretch before you exercise  Walk or ride a stationary bike for 5 to 10 minutes to help you warm up  Stretching helps increase range of motion  It may also decrease muscle soreness and help prevent another injury  Your healthcare provider will tell you which of the following stretches to do:  · Crossover arm stretch:  Relax your shoulders  Hold your upper arm with the opposite hand  Pull your arm across your chest until you feel a stretch  Hold the stretch for 30 seconds  Return to the starting position  · Shoulder flexion stretch:  Stand facing a wall  Slowly walk your fingers up the wall until you feel a stretch  Hold the stretch for 30 seconds  Return to the starting position  · Sleeper stretch:  Lie on your injured side on a firm, flat surface  Bend the elbow of your injured arm 90° with your hand facing up  Use your arm that is not injured to slowly push your injured arm down  Stop when you feel a stretch at the back of your injured shoulder  Hold the stretch for 30 seconds  Slowly return to the starting position  How to exercise with a weight:  Your healthcare provider will tell you how much weight to use  · Shoulder abduction:  Stand and hold a weight in your hand with your palm facing your body  Slowly raise your arm to the side with your thumb pointing up  Then raise your arm over your head as far as you can without pain  Hold this position for as long as directed  Do not raise your arm over your head unless your healthcare provider says it is okay            · Shoulder adduction:  Lie on your back on a firm surface  Extend your arm out to a "T " Bend your elbow so your forearm in the air  Hold a weight in your hand  Slowly raise your arm toward the ceiling and straighten your elbow  Hold this position for as long as directed  Slowly return to the starting position  How to exercise with an exercise band:   · Shoulder abduction:  Wrap the exercise band around a heavy, stable object near your foot  Grab the band with the hand of your injured shoulder  Keep your arm straight  Slowly raise your arm to the side with your thumb pointing up  Then, slowly pull the band over your head as far as you can without pain  Do not raise your arm over your head unless your healthcare provider says it is okay  Do not let your shoulder shrug  Hold this position for as long as directed  Slowly return to the starting position  · Shoulder adduction:  Wrap the exercise band around a heavy, stable object  Stand and face away from where the band is anchored  Hold each end of the band in both hands with your elbows bent  Your elbows should not be behind your body  Keep your arms parallel to the floor and slowly straighten your elbows  Hold this position for as long as directed  Slowly return to the starting position  Follow up with your physical therapist as directed:  Write down your questions so you remember to ask them at your visits  © Copyright [address] Information is for End User's use only and may not be sold, redistributed or otherwise used for commercial purposes  All illustrations and images included in CareNotes® are the copyrighted property of A D A M , Inc  or [facility] Jacob Hagen   The above information is an  only  It is not intended as medical advice for individual conditions or treatments  Talk to your doctor, nurse or pharmacist before following any medical regimen to see if it is safe and effective for you    Exercises for Internal and External Shoulder Rotation   WHAT YOU NEED TO KNOW:   Exercises for internal shoulder rotation work the muscles in your chest and front of your shoulder  Exercises for external shoulder rotation work the muscles in the back of your shoulder and upper back  DISCHARGE INSTRUCTIONS:   Contact your healthcare provider if:   · You have sharp or worsening pain during exercise or at rest     · You have questions or concerns about your shoulder exercises  Before you exercise:  Warm up and stretch before you exercise  Walk or ride a stationary bike for 5 to 10 minutes to help you warm up  Stretching helps increase range of motion  It may also decrease muscle soreness and help prevent another injury  Your healthcare provider will tell you which of the following stretches to do:  · Crossover arm stretch:  Relax your shoulders  Hold your upper arm with the opposite hand  Pull your arm across your chest until you feel a stretch  Hold the stretch for 30 seconds  Return to the starting position  · Shoulder flexion stretch:  Stand facing a wall  Slowly walk your fingers up the wall until you feel a stretch  Hold the stretch for 30 seconds  Return to the starting position  · Sleeper stretch:  Lie on your injured side on a firm, flat surface  Bend the elbow of your injured arm 90° with your hand facing up  Use your arm that is not injured to slowly push your injured arm down  Stop when you feel a stretch at the back of your injured shoulder  Hold the stretch for 30 seconds  Slowly return to the starting position  How to exercise with a weight:  Your healthcare provider will tell you how much weight to exercise with  · Shoulder internal rotation:  Sit in a chair  Place a rolled up towel between your elbow and your side  Bend your elbow to 90°  Gently squeeze the towel with your elbow to prevent it from falling out  Hold the weight with your thumb pointing up   Slowly move the weight across your chest  Stop when your hand reaches your opposite arm  Hold this position for as many seconds as directed  Slowly return to the starting position  · Shoulder external rotation:  Lie on your side with your injured shoulder facing up  Bend your elbow 90°  Place a rolled up towel between your elbow and your side  Hold a weight in your hand  Gently squeeze the towel with your elbow to prevent it from falling out  Slowly rotate your arm outward, but keep your elbow bent  Stop when you feel a stretch  Hold this position for 30 seconds or as directed  Slowly return to the starting position  How to exercise with an exercise band:   · Shoulder internal rotation:  Tie one end of the exercise band to a heavy, secure object  Sit in a chair  Place a rolled up towel between your elbow and your side  Bend your elbow to 90°  Gently squeeze the towel with your elbow to prevent it from falling out  Slowly pull the band across your chest  Stop when your hand reaches your opposite arm  Hold this position for as many seconds as directed  Slowly return to the starting position  · Shoulder external rotation:  Hold one end of the exercise band on the side that is not injured  Place a rolled up towel between your elbow and your side  Bend your elbow 90°  Squeeze the towel with your elbow  Grab the end of the band and slowly turn your arm outward, but keep your elbow bent  Stop when you feel a stretch  Hold this position for 30 seconds or as directed  Slowly return to the starting position  Follow up with your physical therapist as directed:  Write down your questions so you remember to ask them at your visits  © Copyright [address] Information is for End User's use only and may not be sold, redistributed or otherwise used for commercial purposes  All illustrations and images included in CareNotes® are the copyrighted property of [employer]  or [facility] Jacob Hagen   The above information is an  only   It is not intended as medical advice for individual conditions or treatments  Talk to your doctor, nurse or pharmacist before following any medical regimen to see if it is safe and effective for you

## 2021-02-12 NOTE — PROGRESS NOTES
Daily Note     Today's date: 2021  Patient name: Ivonne Johnson  : 1940  MRN: [id number]  Referring provider: Sue Nazario DPM  Dx:   Encounter Diagnosis     ICD-10-CM    1  Gait instability  R26 [id number]        Start Time: 0800  Stop Time: 0845  Total time in clinic (min): 45 minutes    Subjective: Patient stated that he was snow blowing the other day and he fell in the snow, required assistance to stand  Objective: See treatment diary below    NM  -Hip flexion on foam- 20 reps, 3 sec holds  -Hip abduction on foam- 20 reps, 3 sec holds  -Hip extension on foam- 20 reps, 3 sec holds  -Heel raises on foam- 20 reps, 3 sec holds    TA  -AP Sway on foam  -Step Taps- forward and laterally- 8" step, 20 reps, 2 UE support from firm  -Step ups- 8" step, 20 reps, forward and laterally, 2 UE support    2 minute walk test  Trial one-beginning of session- 200 feet, 1:35  Trial two-end of session- 150 feet, 1:20 feet      Assessment: Patient tolerated session well, initiating his POC well today  Patient demonstrates decreased cardiovascular endurance today, patient challenged with his endurance as well as his foot pain today, patient challenged with his overall motion today and foot pain, rest needed after each intervention  Patient requires skilled PT in order to improve his balance and endurance  Plan: Continue per plan of care  Progress treatment as tolerated         Precautions:  Past Medical History:   Diagnosis Date    Arthritis     [facility]     Chronic kidney disease     stage 3    Coronary artery disease     2 stents    Fluid retention     Gout     [facility]     pacemaker    Hypertension     Pacemaker     [facility]     Radiculopathy     last assessed 16     Shortness of breath     exertion    Sleep apnea     c pap           Outcome Measures 21            5x sit to stand 12 63 seconds no UE            30 second sit to stand 12 no UE TUG 9 66 sec            Gait Speed   84 m/s            2 minute walk test 150 feet            [place] 39/56            mCTSIB FTEO- 30 sec  FTEC- 30 sec   FTEO- 30 sec FTEC-7 5 sec            2 minute walk teest 150 feet                                                                Ther Ex                                                                                                                     Ther Activity                                       Gait Training                                       Modalities

## 2021-02-12 NOTE — PROGRESS NOTES
Assessment/Plan:  1  Rotator cuff tear arthropathy, right     2  Primary osteoarthritis of both shoulders  XR shoulder 2+ vw left    XR shoulder 2+ vw right   3  Rotator cuff tendinitis, left     4  Impingement syndrome of left shoulder     5  Impingement syndrome of right shoulder         Scribe Attestation    I,:  Pervis Session am acting as a scribe while in the presence of the attending physician :       I,:  Candelario Habermann, MD personally performed the services described in this documentation    as scribed in my presence :            [employer]" upon examination and review the x-rays of left and shoulders does demonstrate signs and symptoms consistent with right rotator cuff tear arthropathy, left rotator cuff tendinitis with glenohumeral joint osteoarthritis  He does demonstrate reasonable strength into abduction however is weak on the right versus the left  Range of motion is within normal limits  As Danna Arana has had success with steroid injections in the past and does demonstrate impingement signs today, I did provide him with steroid injections into the subacromial space of both the left and right shoulders  He did tolerate the injections well with no complications  He may ice his shoulders tonight as he may be sore from the injections themselves  I did also provide him with a physician directed exercise program be completed at home and incorporated into his exercise routine  My  will instruct on proper execution of these exercises today  He may continue with activities of daily living as tolerated with no restrictions  I did encourage him to continue use of the upper extremities and remain active  Danna Sumit verbalized understanding of all information provided to him today and had no further questions    He may follow up with me on as-needed basis    Large joint arthrocentesis: R subacromial bursa  Universal Protocol:  Risks and benefits: risks, benefits and alternatives were discussed  Consent given by: patient  Timeout called at: 2/12/2021 10:32 AM   Patient understanding: patient states understanding of the procedure being performed  Site marked: the operative site was marked  Radiology Images displayed and confirmed  If images not available, report reviewed: imaging studies available  Patient identity confirmed: verbally with patient    Supporting Documentation  Indications: pain   Procedure Details  Location: shoulder - R subacromial bursa  Needle size: 22 G  Ultrasound guidance: no  Approach: anterior  Medications administered: 40 mg dexamethasone 100 mg/10 mL; 4 mL lidocaine 1 %    Patient tolerance: patient tolerated the procedure well with no immediate complications  Dressing:  Sterile dressing applied    Large joint arthrocentesis: L subacromial bursa  Universal Protocol:  Consent: Verbal consent obtained  Risks and benefits: risks, benefits and alternatives were discussed  Consent given by: patient  Time out: Immediately prior to procedure a "time out" was called to verify the correct patient, procedure, equipment, support staff and site/side marked as required  Timeout called at: 2/12/2021 10:34 AM   Patient understanding: patient states understanding of the procedure being performed  Site marked: the operative site was marked  Radiology Images displayed and confirmed   If images not available, report reviewed: imaging studies available    Supporting Documentation  Indications: pain   Procedure Details  Location: shoulder - L subacromial bursa  Needle size: 22 G  Ultrasound guidance: no  Approach: anterior  Medications administered: 40 mg dexamethasone 100 mg/10 mL; 4 mL lidocaine 1 %    Patient tolerance: patient tolerated the procedure well with no immediate complications  Dressing:  Sterile dressing applied          Subjective:   Shelbi Acosta is a [de-identified] y o  male who presents to the office today for initial evaluation of his  left shoulder and follow-up evaluation of his  right shoulder  He was last evaluated for his right shoulder in December of 2018 where he did receive a subacromial steroid injection  He remarks that he did suffer a fall approximately 2 months ago and landed onto the right side with his arm close to his body  He states he does have a history of falling and is working with the therapist at the [address]  He states he is experiencing intermittent and moderate sharp and aching pain about the lateral aspect of his shoulder  He also complains of anterior posterior left shoulder pain that has been ongoing for several months with no traumatic injury  He notes that overhead reaching activities will exacerbate pain in both the left and right shoulders  He notes his pain is better while at rest   He does try to remain active with dumbbells at home to keep his shoulder strong with high repetition exercises  He does also use a elliptical to maintain motion is knees as well as into the shoulders  He denies any distal paresthesias into the left and right upper extremities  He denies a history of diabetes  Review of Systems   Constitutional: Negative for chills, fever and unexpected weight change  HENT: Negative for hearing loss, nosebleeds and sore throat  Eyes: Negative for pain, redness and visual disturbance  Respiratory: Negative for cough, shortness of breath and wheezing  Cardiovascular: Negative for chest pain, palpitations and leg swelling  Gastrointestinal: Negative for abdominal pain, nausea and vomiting  Endocrine: Negative for polyphagia and polyuria  Genitourinary: Negative for dysuria and hematuria  Musculoskeletal: Positive for arthralgias and myalgias  See HPI   Skin: Negative for rash and wound  Neurological: Negative for dizziness, numbness and headaches  Psychiatric/Behavioral: Negative for decreased concentration and suicidal ideas  The patient is not nervous/anxious            Past Medical History: Diagnosis Date    Arthritis     [facility]     Chronic kidney disease     stage 3    Coronary artery disease     2 stents    Fluid retention     Gout     Heart failure (HCC)     pacemaker    Hypertension     Pacemaker     [facility] emphysema [facility]     Radiculopathy     last assessed 16     Shortness of breath     exertion    Sleep apnea     c pap       Past Surgical History:   Procedure Laterality Date    ANGIOPLASTY      x2 2 stents and then replaced    CARDIAC PACEMAKER PLACEMENT      pacemaker permanent placement dual chamber / last assessed 14 / implantation     CARDIAC SURGERY      pacemaker    CHOLECYSTECTOMY      CORONARY ANGIOPLASTY WITH STENT PLACEMENT      EPIDURAL BLOCK INJECTION N/A 2016    Procedure: BLOCK / INJECTION EPIDURAL STEROID LUMBAR  L4-5;  Surgeon: Suzanna Mendez MD;  Location: [place] [address] MAIN OR;  Service:     EPIDURAL BLOCK INJECTION N/A 2019    Procedure: L4 L5 Lumbar Epidural Steroid Injection;  Surgeon: Suzanna Mendez MD;  Location: [place] [address] MAIN OR;  Service: Pain Management     EYE SURGERY      cataract left    KNEE ARTHROSCOPY W/ MENISCAL REPAIR Left     LUMBAR EPIDURAL INJECTION N/A 3/17/2016    Procedure: BLOCK / INJECTION LUMBAR  L4-5  (C-ARM);   Surgeon: Suzanna Mendez MD;  Location: [facility] MAIN OR;  Service:        Family History   Problem Relation Age of Onset    Cancer Mother 80    Heart disease Mother     Hypertension Mother     Heart disease Father     Diabetes Neg Hx     Stroke Neg Hx        Social History     Occupational History    Occupation: RETIRED   Tobacco Use    Smoking status: Former Smoker     Packs/day: 1 00     Years: 50 00     Pack years: 50 00     Types: Cigarettes     Quit date: 3/27/2019     Years since quittin 8    Smokeless tobacco: Never Used    Tobacco comment: quit 2 weeks ago   Substance and Sexual Activity    Alcohol use: Never     Frequency: Never    Drug use: No    Sexual activity: Not on file         Current Outpatient Medications:     albuterol (Ventolin HFA) 90 mcg/act inhaler, Inhale 2 puffs every 6 (six) hours as needed for wheezing, Disp: 3 Inhaler, Rfl: 3    allopurinol (ZYLOPRIM) 300 mg tablet, Take 1 tablet (300 mg total) by mouth daily, Disp: 90 tablet, Rfl: 3    ascorbic acid (VITAMIN C) 500 mg tablet, Take 500 mg by mouth daily  , Disp: , Rfl:     B Complex Vitamins (B COMPLEX 1 PO), Take 1 tablet by mouth daily  , Disp: , Rfl:     Biotin (BIOTIN 5000) 5 MG CAPS, Take 1,000 mcg by mouth daily  , Disp: , Rfl:     calcium carbonate-vitamin D (OSCAL-D) 500 mg-200 units per tablet, Take 2 tablets by mouth daily at bedtime  , Disp: , Rfl:     clopidogrel (PLAVIX) 75 mg tablet, Take 1 tablet (75 mg total) by mouth daily, Disp: 90 tablet, Rfl: 1    colchicine (COLCRYS) 0 6 mg tablet, Take 1 tablet (0 6 mg total) by mouth 2 (two) times a day Take twice daily x 7 days as needed for gout, Disp: 30 tablet, Rfl: 5    Cranberry 1000 MG CAPS, Take 1 tablet by mouth 2 (two) times a day , Disp: , Rfl:     cyanocobalamin 1000 MCG tablet, Take 100 mcg by mouth daily, Disp: , Rfl:     dextromethorphan-guaiFENesin (ROBITUSSIN DM)  mg/5 mL syrup, Take 10 mL by mouth every 4 (four) hours as needed for cough, Disp: 118 mL, Rfl: 0    felodipine (PLENDIL) 5 mg 24 hr tablet, Take 1 tablet (5 mg total) by mouth daily, Disp: 90 tablet, Rfl: 1    finasteride (PROSCAR) 5 mg tablet, Take 1 tablet (5 mg total) by mouth daily, Disp: 90 tablet, Rfl: 3    Flaxseed, Linseed, (EQL FLAX SEED OIL) 1000 MG CAPS, Take by mouth daily  , Disp: , Rfl:     fluticasone-vilanterol (BREO ELLIPTA) 100-25 mcg/inh inhaler, Inhale 1 puff daily Rinse mouth after use , Disp: 3 Inhaler, Rfl: 3    furosemide (LASIX) 20 mg tablet, TAKE 2 TABLETS (40 MG) IN AM AND 1 TABLET (20 MG) IN PM, Disp: 270 tablet, Rfl: 3    furosemide (LASIX) 40 mg tablet, 1 TABLET BY MOUTH IN THE MORNING AND 1/2 TABLET IN THE EVENING, Disp: , Rfl:    glucosamine-chondroitin 500-400 MG tablet, Take 1 tablet by mouth 2 (two) times a day , Disp: , Rfl:     ipratropium-albuterol (DUO-NEB) 0 5-2 5 mg/3 mL nebulizer solution, USE 1 VIAL IN NEBULIZER 3 TIMES DAILY, Disp: 90 vial, Rfl: 11    lactase (LACTAID) 3,000 units tablet, Take 3,000 Units by mouth as needed  , Disp: , Rfl:     Omega-3 Fatty Acids (FISH OIL) 1,000 mg, Take 1,000 mg by mouth 2 (two) times a day , Disp: , Rfl:     PARoxetine (PAXIL) 20 mg tablet, Take 1 tablet (20 mg total) by mouth daily, Disp: 90 tablet, Rfl: 1    rivaroxaban (XARELTO) 15 mg tablet, Take 1 tablet (15 mg total) by mouth daily with breakfast (Patient taking differently: Take 20 mg by mouth daily with breakfast ), Disp: , Rfl:     simvastatin (ZOCOR) 20 mg tablet, Take 1 tablet (20 mg total) by mouth daily at bedtime, Disp: 90 tablet, Rfl: 1    traMADol (ULTRAM) 50 mg tablet, Take 50 mg by mouth every 6 (six) hours as needed for moderate pain, Disp: , Rfl:     fluocinonide (LIDEX) 0 05 % cream, Apply topically 2 (two) times a day Prn leg rash, short term, Disp: 120 g, Rfl: 2    No Known Allergies    Objective:  Vitals:    02/12/21 0932   BP: 108/65   Pulse: 72       Right Shoulder Exam     Tenderness   Right shoulder tenderness location:   Greater tuberosity  Range of Motion   Active abduction: 150   External rotation: 60   Internal rotation 0 degrees: L4     Muscle Strength   Abduction: 4/5   Internal rotation: 5/5   External rotation: 5/5     Tests   Cuevas test: positive  Impingement: positive    Other   Erythema: absent  Scars: absent  Sensation: normal  Pulse: present      Left Shoulder Exam     Tenderness   Left shoulder tenderness location:  anterior, posterior      Range of Motion   Active abduction: 150   External rotation: 60   Internal rotation 0 degrees: L4     Muscle Strength   Abduction: 4/5   Internal rotation: 5/5   External rotation: 5/5     Tests   Cuevas test: positive  Impingement: positive    Other Erythema: absent  Scars: absent  Sensation: normal  Pulse: present             Physical Exam  Vitals signs reviewed  HENT:      Head: Normocephalic and atraumatic  Eyes:      General:         Right eye: No discharge  Left eye: No discharge  Conjunctiva/sclera: Conjunctivae normal       Pupils: Pupils are equal, round, and reactive to light  Neck:      Musculoskeletal: Normal range of motion and neck supple  Cardiovascular:      Rate and Rhythm: Normal rate  Pulmonary:      Effort: Pulmonary effort is normal  No respiratory distress  Musculoskeletal:      Comments: As noted in HPI   Skin:     General: Skin is warm and dry  Neurological:      Mental Status: He is alert and oriented to person, place, and time  Psychiatric:         Mood and Affect: Mood normal          Behavior: Behavior normal            I have personally reviewed pertinent films in PACS and my interpretation is as follows:    X-rays of the right shoulder demonstrate mild-to-moderate glenohumeral joint osteoarthritis  High-riding humeral head relation to the glenoid concerning for rotator cuff tear arthropathy  X-rays of the left shoulder demonstrate mild glenohumeral joint osteoarthritis  No acute fractures demonstrated

## 2021-02-15 ENCOUNTER — OFFICE VISIT (OUTPATIENT)
Dept: PHYSICAL THERAPY | Facility: CLINIC | Age: 81
End: 2021-02-15
Payer: MEDICARE

## 2021-02-15 DIAGNOSIS — R26.81 GAIT INSTABILITY: Primary | ICD-10-CM

## 2021-02-15 PROCEDURE — 97112 NEUROMUSCULAR REEDUCATION: CPT

## 2021-02-15 PROCEDURE — 97110 THERAPEUTIC EXERCISES: CPT

## 2021-02-15 PROCEDURE — 97530 THERAPEUTIC ACTIVITIES: CPT

## 2021-02-15 NOTE — PROGRESS NOTES
Daily Note     Today's date: 2/15/2021  Patient name: Lana Duncan  : 1940  MRN: [id number]  Referring provider: Rosaline Broussard DPM  Dx:   Encounter Diagnosis     ICD-10-CM    1  Gait instability  R26 [id number]        Start Time: 845  Stop Time: 930  Total time in clinic (min): 45 minutes    Subjective: Patient stated no falls over the weekend  Objective: See treatment diary below    NM  -Hip flexion on foam- 20 reps, 3 sec holds  -Hip abduction on foam- 20 reps, 3 sec holds  -Hip extension on foam- 20 reps, 3 sec holds  -Heel raises on foam- 20 reps, 3 sec holds  -Step over josé onto foam- forward and laterally- 20 reps 3    TA  -AP Sway on foam  -Step Taps- forward and laterally- 8" step, 20 reps, 2 UE support from firm  -Step ups- 8" step, 20 reps, forward and laterally, 2 UE support  -Hurdles - forward and laterally- 10 cycles of 10 feet, 6" hurdles 1 UE    2 minute walk test  Trial one-beginning of session- 180 feet, 1:45  Trial two-end of session- 200 feet, 1:20 feet      Assessment: Patient tolerated session well, progressing his POC well today  Patient challenged with unsupported activities and reducing circumduction, continually challenged with endurance  Patient vestibular and somoatosensory component limited for balance  Patient requires skilled PT in order to improve his balance and endurance  Plan: Continue per plan of care  Progress treatment as tolerated         Precautions:  Past Medical History:   Diagnosis Date    Arthritis     [facility]     Chronic kidney disease     stage 3    Coronary artery disease     2 stents    Fluid retention     Gout     Heart failure ([facility]     pacemaker    Hypertension     Pacemaker     Pulmonary emphysema [facility]     Radiculopathy     last assessed 16     Shortness of breath     exertion    Sleep apnea     c pap           Outcome Measures 21            5x sit to stand 12 63 seconds no UE            30 second sit to stand 12 no UE            TUG 9 66 sec            Gait Speed   84 m/s            2 minute walk test 150 feet            [place] 39/56            mCTSIB FTEO- 30 sec  FTEC- 30 sec   FTEO- 30 sec FTEC-7 5 sec            2 minute walk teest 150 feet                                                                Ther Ex                                                                                                                     Ther Activity                                       Gait Training                                       Modalities

## 2021-02-16 ENCOUNTER — OFFICE VISIT (OUTPATIENT)
Dept: FAMILY MEDICINE CLINIC | Facility: CLINIC | Age: 81
End: 2021-02-16
Payer: MEDICARE

## 2021-02-16 VITALS
OXYGEN SATURATION: 96 % | HEIGHT: 73 IN | TEMPERATURE: 98.6 F | RESPIRATION RATE: 24 BRPM | HEART RATE: 76 BPM | DIASTOLIC BLOOD PRESSURE: 64 MMHG | BODY MASS INDEX: 37.11 KG/M2 | SYSTOLIC BLOOD PRESSURE: 126 MMHG | WEIGHT: 280 LBS

## 2021-02-16 DIAGNOSIS — Z00.00 MEDICARE ANNUAL WELLNESS VISIT, SUBSEQUENT: Primary | ICD-10-CM

## 2021-02-16 DIAGNOSIS — I25.10 CORONARY ARTERY DISEASE INVOLVING NATIVE CORONARY ARTERY OF NATIVE HEART WITHOUT ANGINA PECTORIS: Chronic | ICD-10-CM

## 2021-02-16 DIAGNOSIS — E79.0 HYPERURICEMIA: ICD-10-CM

## 2021-02-16 DIAGNOSIS — E78.5 HYPERLIPIDEMIA, UNSPECIFIED HYPERLIPIDEMIA TYPE: ICD-10-CM

## 2021-02-16 DIAGNOSIS — F06.4 ANXIETY DISORDER DUE TO MEDICAL CONDITION: ICD-10-CM

## 2021-02-16 DIAGNOSIS — K59.09 CHRONIC CONSTIPATION: ICD-10-CM

## 2021-02-16 DIAGNOSIS — I12.9 BENIGN HYPERTENSION WITH CHRONIC KIDNEY DISEASE, STAGE III (HCC): ICD-10-CM

## 2021-02-16 DIAGNOSIS — I25.10 ARTERIOSCLEROSIS OF CORONARY ARTERY: ICD-10-CM

## 2021-02-16 DIAGNOSIS — Z95.5 STATUS POST PRIMARY ANGIOPLASTY WITH CORONARY STENT: Chronic | ICD-10-CM

## 2021-02-16 DIAGNOSIS — Z12.5 PROSTATE CANCER SCREENING: ICD-10-CM

## 2021-02-16 DIAGNOSIS — N18.30 BENIGN HYPERTENSION WITH CHRONIC KIDNEY DISEASE, STAGE III (HCC): ICD-10-CM

## 2021-02-16 PROBLEM — J96.01 ACUTE RESPIRATORY FAILURE WITH HYPOXIA (HCC): Status: RESOLVED | Noted: 2019-04-11 | Resolved: 2021-02-16

## 2021-02-16 PROCEDURE — 99214 OFFICE O/P EST MOD 30 MIN: CPT | Performed by: FAMILY MEDICINE

## 2021-02-16 PROCEDURE — 1123F ACP DISCUSS/DSCN MKR DOCD: CPT | Performed by: FAMILY MEDICINE

## 2021-02-16 PROCEDURE — G0439 PPPS, SUBSEQ VISIT: HCPCS | Performed by: FAMILY MEDICINE

## 2021-02-16 RX ORDER — FLUTICASONE FUROATE, UMECLIDINIUM BROMIDE AND VILANTEROL TRIFENATATE 100; 62.5; 25 UG/1; UG/1; UG/1
1 POWDER RESPIRATORY (INHALATION) DAILY
COMMUNITY
End: 2021-12-16

## 2021-02-16 RX ORDER — LINACLOTIDE 72 UG/1
1 CAPSULE, GELATIN COATED ORAL EVERY MORNING
Qty: 30 CAPSULE | Refills: 5 | Status: SHIPPED | OUTPATIENT
Start: 2021-02-16 | End: 2021-04-01

## 2021-02-16 NOTE — PROGRESS NOTES
Assessment/Plan:    No problem-specific Assessment & Plan notes found for this encounter  Chronic constipation, trial of linzess, offer gastroenterologist next if no better, failed miralax and colace per pt    Anxiety unchanged, cont SSRI, do not use tramadol    Gout unchanged, continue medications and diet    htn stable  HLD stable     Diagnoses and all orders for this visit:    Medicare annual wellness visit, subsequent    Chronic constipation  -     linaCLOtide (Linzess) 72 MCG CAPS; Take 1 capsule by mouth every morning , 30 minutes before first meal    Hyperuricemia    Anxiety 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    Arteriosclerosis of coronary artery  -     Lipid Panel with Direct LDL reflex; Future    Prostate cancer screening  -     PSA, Total Screen; Future    Hyperlipidemia, unspecified hyperlipidemia type  -     Hepatic function panel; Future    Coronary artery disease involving native coronary artery of native heart without angina pectoris    Status post primary angioplasty with coronary stent    Benign hypertension with chronic kidney disease, stage III ([facility] )    Other orders  -     fluticasone-umeclidinium-vilanterol (Trelegy Ellipta) 100-62 5-25 MCG/INH inhaler; Inhale 1 puff daily Rinse mouth after use  Return in about 6 months (around 8/16/2021) for Recheck  Subjective:      Patient ID: Zachery Miller is a [de-identified] y o  male      Chief Complaint   Patient presents with    Follow-up     [facility] lpn    cologaurd order       HPI    Constipated, hard per pt, over 1year  Large  occas blood seen  Took Miralax in past, also a pill that did not work  Colonoscopy many years ago  Also interested in cologuard  No bm daily  Eating fruits/veggies    Options offered    Low salt diet  No dairy  No meat  Some carbs  Drinks water    Low fat diet  Daily zocor    Urine flow is good    paxil for anxiety  paxil 20mg helping    The following portions of the patient's history were reviewed and updated as appropriate: allergies, current medications, past family history, past medical history, past social history, past surgical history and problem list     Review of Systems   Constitutional: Negative for fever  Respiratory: Positive for shortness of breath  Neurological: Negative for syncope  Current Outpatient Medications   Medication Sig Dispense Refill    albuterol (Ventolin HFA) 90 mcg/act inhaler Inhale 2 puffs every 6 (six) hours as needed for wheezing 3 Inhaler 3    allopurinol (ZYLOPRIM) 300 mg tablet Take 1 tablet (300 mg total) by mouth daily 90 tablet 3    ascorbic acid (VITAMIN C) 500 mg tablet Take 500 mg by mouth daily   B Complex Vitamins (B COMPLEX 1 PO) Take 1 tablet by mouth daily   Biotin (BIOTIN 5000) 5 MG CAPS Take 1,000 mcg by mouth daily   calcium carbonate-vitamin D (OSCAL-D) 500 mg-200 units per tablet Take 2 tablets by mouth daily at bedtime   clopidogrel (PLAVIX) 75 mg tablet Take 1 tablet (75 mg total) by mouth daily 90 tablet 1    colchicine (COLCRYS) 0 6 mg tablet Take 1 tablet (0 6 mg total) by mouth 2 (two) times a day Take twice daily x 7 days as needed for gout 30 tablet 5    Cranberry 1000 MG CAPS Take 1 tablet by mouth 2 (two) times a day   cyanocobalamin 1000 MCG tablet Take 100 mcg by mouth daily      dextromethorphan-guaiFENesin (ROBITUSSIN DM)  mg/5 mL syrup Take 10 mL by mouth every 4 (four) hours as needed for cough 118 mL 0    felodipine (PLENDIL) 5 mg 24 hr tablet Take 1 tablet (5 mg total) by mouth daily 90 tablet 1    finasteride (PROSCAR) 5 mg tablet Take 1 tablet (5 mg total) by mouth daily 90 tablet 3    Flaxseed, Linseed, (EQL FLAX SEED OIL) 1000 MG CAPS Take by mouth daily   fluticasone-umeclidinium-vilanterol (Trelegy Ellipta) 100-62 5-25 MCG/INH inhaler Inhale 1 puff daily Rinse mouth after use   fluticasone-vilanterol (BREO ELLIPTA) 100-25 mcg/inh inhaler Inhale 1 puff daily Rinse mouth after use   3 Inhaler 3    furosemide (LASIX) 20 mg tablet TAKE 2 TABLETS (40 MG) IN AM AND 1 TABLET (20 MG) IN  tablet 3    glucosamine-chondroitin 500-400 MG tablet Take 1 tablet by mouth 2 (two) times a day   ipratropium-albuterol (DUO-NEB) 0 5-2 5 mg/3 mL nebulizer solution USE 1 VIAL IN NEBULIZER 3 TIMES DAILY 90 vial 11    lactase (LACTAID) 3,000 units tablet Take 3,000 Units by mouth as needed   Omega-3 Fatty Acids (FISH OIL) 1,000 mg Take 1,000 mg by mouth 2 (two) times a day   PARoxetine (PAXIL) 20 mg tablet Take 1 tablet (20 mg total) by mouth daily 90 tablet 1    rivaroxaban (XARELTO) 15 mg tablet Take 1 tablet (15 mg total) by mouth daily with breakfast (Patient taking differently: Take 20 mg by mouth daily with breakfast )      simvastatin (ZOCOR) 20 mg tablet Take 1 tablet (20 mg total) by mouth daily at bedtime 90 tablet 1    fluocinonide (LIDEX) 0 05 % cream Apply topically 2 (two) times a day Prn leg rash, short term 120 g 2    linaCLOtide (Linzess) 72 MCG CAPS Take 1 capsule by mouth every morning , 30 minutes before first meal 30 capsule 5     No current facility-administered medications for this visit  Objective:    /64   Pulse 76   Temp 98 6 °F (37 °C)   Resp (!) 24   Ht 6' 1" (1 854 m)   Wt 127 kg (280 lb)   SpO2 96%   BMI 36 94 kg/m²        Physical Exam  Vitals signs and nursing note reviewed  Constitutional:       Appearance: He is well-developed  He is obese  He is not ill-appearing  HENT:      Head: Normocephalic  Right Ear: Tympanic membrane normal  There is no impacted cerumen  Left Ear: Tympanic membrane normal  There is no impacted cerumen  Mouth/Throat:      Pharynx: No oropharyngeal exudate  Eyes:      General: No scleral icterus  Conjunctiva/sclera: Conjunctivae normal    Neck:      Musculoskeletal: Neck supple  Cardiovascular:      Rate and Rhythm: Normal rate and regular rhythm  Heart sounds: No murmur     Pulmonary: Effort: Pulmonary effort is normal  No respiratory distress  Breath sounds: No wheezing or rales  Abdominal:      Palpations: Abdomen is soft  Musculoskeletal:         General: No deformity  Skin:     General: Skin is warm and dry  Coloration: Skin is not jaundiced or pale  Neurological:      Mental Status: He is alert  Psychiatric:         Mood and Affect: Mood normal          Behavior: Behavior normal          Thought Content:  Thought content normal                 Juli Chairez DO

## 2021-02-17 ENCOUNTER — OFFICE VISIT (OUTPATIENT)
Dept: PHYSICAL THERAPY | Facility: CLINIC | Age: 81
End: 2021-02-17
Payer: MEDICARE

## 2021-02-17 DIAGNOSIS — R26.81 GAIT INSTABILITY: Primary | ICD-10-CM

## 2021-02-17 PROCEDURE — 97112 NEUROMUSCULAR REEDUCATION: CPT | Performed by: PHYSICAL THERAPIST

## 2021-02-17 PROCEDURE — 97530 THERAPEUTIC ACTIVITIES: CPT | Performed by: PHYSICAL THERAPIST

## 2021-02-17 NOTE — PROGRESS NOTES
Assessment and Plan:     Problem List Items Addressed This Visit        Active Problems    Anxiety 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    Arteriosclerosis of coronary artery    Relevant Orders    Lipid Panel with Direct LDL reflex    Hyperlipidemia    Relevant Orders    Hepatic function panel    Status post primary angioplasty with coronary stent (Chronic)    Benign hypertension with chronic kidney disease, stage III (HCC)    Hyperuricemia    CAD (coronary artery disease) (Chronic)    Chronic constipation    Relevant Medications    linaCLOtide (Linzess) 72 MCG CAPS    Medicare annual wellness visit, subsequent - Primary      Other Visit Diagnoses     Prostate cancer screening        Relevant Orders    PSA, Total Screen           Preventive health issues were discussed with patient, and age appropriate screening tests were ordered as noted in patient's After Visit Summary  Personalized health advice and appropriate referrals for health education or preventive services given if needed, as noted in patient's After Visit Summary       History of Present Illness:     Patient presents for Medicare Annual Wellness visit    Patient Care Team:  Tatiana Goodpasture, DO as PCP - General (Family Medicine)  Lesly Gun, MD Jaclynn Denver, MD Tereasa Hinders, MD Rushie Blackbird, MD Alanson Has, MD Kaitlin Beal MD (Pain Medicine)     Problem List:     Patient Active Problem List   Diagnosis    Chronic pain syndrome    Bilateral lumbar radiculopathy    Lumbar canal stenosis    Atherosclerosis of arteries of extremities ([address]    Anxiety 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    Benign hypertension with chronic kidney disease, stage III ([address]    Microscopic hematuria    Proteinuria    Benign prostatic hyperplasia    Acute on chronic diastolic congestive heart failure ([address] )    Arteriosclerosis of coronary artery    Allergic rhinitis    Aneurysm of abdominal aorta (HCC)    Chronic a-fib ([address]    Carpal tunnel syndrome    Chronic gout without tophus    Chronic bilateral low back pain with bilateral sciatica    [facility]    Deep venous insufficiency    Degenerative disc disease, lumbar    Difficulty in walking    Fecal soiling    Fibromyalgia    Fistula-in-ano    Hyperlipidemia    Moderate or severe vision impairment, one eye    Morbid obesity ([facility])    Osteoarthritis of knee    Pacemaker    JOVI (obstructive sleep apnea)    Urinary incontinence    Venous insufficiency (chronic) (peripheral)    BMI 45 0-49 9, adult ([facility])    Elevated glucose    Peripheral arteriosclerosis ([facility])    Lumbar radiculopathy    Spinal stenosis, lumbar region, with neurogenic claudication    Serum total bilirubin elevated    CKD (chronic kidney disease) stage 3, GFR 30-59 ml/min    CAD (coronary artery disease)    Status post primary angioplasty with coronary stent    Mixed simple and mucopurulent chronic bronchitis ([facility])    Wrist pain, acute, right    Hyperuricemia    Bilateral leg edema    Nephrolithiasis    Former smoker    Anemia    Cough    Hematuria    Vitamin D insufficiency    Chronic constipation    Medicare annual wellness visit, subsequent      Past Medical and Surgical History:     Past Medical History:   Diagnosis Date    Arthritis     Atrial flutter ([facility])     Chronic kidney disease     stage 3    Coronary artery disease     2 stents    Fluid retention     Gout     Heart failure ([address] )     pacemaker    Hypertension     Pacemaker     Pulmonary emphysema [facility]     Radiculopathy     last assessed 1/28/16     Shortness of breath     exertion    Sleep apnea     c pap     Past Surgical History:   Procedure Laterality Date    ANGIOPLASTY      x2 2 stents and then replaced    CARDIAC PACEMAKER PLACEMENT      pacemaker permanent placement dual chamber / last assessed 4/7/14 / implantation     CARDIAC SURGERY      pacemaker    CHOLECYSTECTOMY  CORONARY ANGIOPLASTY WITH STENT PLACEMENT      EPIDURAL BLOCK INJECTION N/A 2016    Procedure: BLOCK / INJECTION EPIDURAL STEROID LUMBAR  L4-5;  Surgeon: Kate Reynoso MD;  [facility] MA[facility] OR;  Service:     EPIDURAL BLOCK INJECTION N/A 2019    Procedure: L4 L5 Lumbar Epidural Steroid Injection;  Surgeon: Kate Reynoso MD;  Location[facility] MAIN OR;  Service: Pain Management     EYE SURGERY      cataract left    KNEE ARTHROSCOPY W/ MENISCAL REPAIR Left     LUMBAR EPIDURAL INJECTION N/A 3/17/2016    Procedure: BLOCK / INJECTION LUMBAR  L4-5  (C-ARM); Surgeon: Kate Reynoso MD;  [facility];  Service:       Family History:     Family History   Problem Relation Age of Onset    Cancer Mother 80    Heart disease Mother     Hypertension Mother     Heart disease Father     Diabetes Neg Hx     Stroke Neg Hx       Social History:        Social History     Socioeconomic History    Marital status: /Civil Union     Spouse name: None    Number of children: None    Years of education: None    Highest education level: None   Occupational History    Occupation: RETIRED   Social Needs    Financial resource strain: Not hard at all   [place] insecurity     Worry: Never true     Inability: Never true    Transportation needs     Medical: No     Non-medical: No   Tobacco Use    Smoking status: Former Smoker     Packs/day: 1 00     Years: 50 00     Pack years: 50 00     Types: Cigarettes     Quit date: 3/27/2019     Years since quittin 8    Smokeless tobacco: Never Used    Tobacco comment: quit 2 weeks ago   Substance and Sexual Activity    Alcohol use: Never     Frequency: Never    Drug use: No    Sexual activity: None   Lifestyle    Physical activity     Days per week: 2 days     Minutes per session: None    Stress: None   Relationships    Social connections     Talks on phone:  Three times a week     Gets together: Twice a week     Attends Religion service: None Active member of club or organization: None     Attends meetings of clubs or organizations: None     Relationship status:     Intimate partner violence     Fear of current or ex partner: No     Emotionally abused: None     Physically abused: No     Forced sexual activity: No   Other Topics Concern    None   Social History Narrative    Daily tea consumption 10 cups day       Medications and Allergies:     Current Outpatient Medications   Medication Sig Dispense Refill    albuterol (Ventolin HFA) 90 mcg/act inhaler Inhale 2 puffs every 6 (six) hours as needed for wheezing 3 Inhaler 3    allopurinol (ZYLOPRIM) 300 mg tablet Take 1 tablet (300 mg total) by mouth daily 90 tablet 3    ascorbic acid (VITAMIN C) 500 mg tablet Take 500 mg by mouth daily   B Complex Vitamins (B COMPLEX 1 PO) Take 1 tablet by mouth daily   Biotin (BIOTIN 5000) 5 MG CAPS Take 1,000 mcg by mouth daily   calcium carbonate-vitamin D (OSCAL-D) 500 mg-200 units per tablet Take 2 tablets by mouth daily at bedtime   clopidogrel (PLAVIX) 75 mg tablet Take 1 tablet (75 mg total) by mouth daily 90 tablet 1    colchicine (COLCRYS) 0 6 mg tablet Take 1 tablet (0 6 mg total) by mouth 2 (two) times a day Take twice daily x 7 days as needed for gout 30 tablet 5    Cranberry 1000 MG CAPS Take 1 tablet by mouth 2 (two) times a day   cyanocobalamin 1000 MCG tablet Take 100 mcg by mouth daily      dextromethorphan-guaiFENesin (ROBITUSSIN DM)  mg/5 mL syrup Take 10 mL by mouth every 4 (four) hours as needed for cough 118 mL 0    felodipine (PLENDIL) 5 mg 24 hr tablet Take 1 tablet (5 mg total) by mouth daily 90 tablet 1    finasteride (PROSCAR) 5 mg tablet Take 1 tablet (5 mg total) by mouth daily 90 tablet 3    Flaxseed, Linseed, (EQL FLAX SEED OIL) 1000 MG CAPS Take by mouth daily   fluticasone-umeclidinium-vilanterol (Trelegy Ellipta) 100-62 5-25 MCG/INH inhaler Inhale 1 puff daily Rinse mouth after use   fluticasone-vilanterol (BREO ELLIPTA) 100-25 mcg/inh inhaler Inhale 1 puff daily Rinse mouth after use  3 Inhaler 3    furosemide (LASIX) 20 mg tablet TAKE 2 TABLETS (40 MG) IN AM AND 1 TABLET (20 MG) IN  tablet 3    glucosamine-chondroitin 500-400 MG tablet Take 1 tablet by mouth 2 (two) times a day   ipratropium-albuterol (DUO-NEB) 0 5-2 5 mg/3 mL nebulizer solution USE 1 VIAL IN NEBULIZER 3 TIMES DAILY 90 vial 11    lactase (LACTAID) 3,000 units tablet Take 3,000 Units by mouth as needed   Omega-3 Fatty Acids (FISH OIL) 1,000 mg Take 1,000 mg by mouth 2 (two) times a day   PARoxetine (PAXIL) 20 mg tablet Take 1 tablet (20 mg total) by mouth daily 90 tablet 1    rivaroxaban (XARELTO) 15 mg tablet Take 1 tablet (15 mg total) by mouth daily with breakfast (Patient taking differently: Take 20 mg by mouth daily with breakfast )      simvastatin (ZOCOR) 20 mg tablet Take 1 tablet (20 mg total) by mouth daily at bedtime 90 tablet 1    fluocinonide (LIDEX) 0 05 % cream Apply topically 2 (two) times a day Prn leg rash, short term 120 g 2    linaCLOtide (Linzess) 72 MCG CAPS Take 1 capsule by mouth every morning , 30 minutes before first meal 30 capsule 5     No current facility-administered medications for this visit        No Known Allergies   Immunizations:     Immunization History   Administered Date(s) Administered    Influenza Split High Dose Preservative Free IM 12/03/2013, 10/23/2015    Influenza, high dose seasonal 0 7 mL 10/01/2018, 10/10/2019, 09/21/2020    Influenza, seasonal, injectable 11/05/2001, 12/15/2009, 01/20/2011, 10/19/2011, 10/01/2012    Pneumococcal Conjugate 13-Valent 07/09/2019    Pneumococcal Polysaccharide PPV23 03/26/2013    Tdap 04/01/2016, 04/03/2016    Zoster 10/01/2012    Zoster Vaccine Recombinant 02/03/2020, 07/08/2020      Health Maintenance:         Topic Date Due    Lung Cancer Screening  06/05/1995     There are no preventive care reminders to display for this patient  Medicare Health Risk Assessment:     /64   Pulse 76   Temp 98 6 °F (37 °C)   Resp (!) 24   Ht 6' 1" (1 854 m)   Wt 127 kg (280 lb)   SpO2 96%   BMI 36 94 kg/m²      Melita Quijano is here for his Subsequent Wellness visit  Last Medicare Wellness visit information reviewed, patient interviewed and updates made to the record today  Health Risk Assessment:   Patient rates overall health as good  Patient feels that their physical health rating is same  Eyesight was rated as same  Hearing was rated as same  Patient feels that their emotional and mental health rating is same  Pain experienced in the last 7 days has been none  Patient states that he has experienced weight loss or gain in last 6 months  Depression Screening:   PHQ-2 Score: 0      Fall Risk Screening: In the past year, patient has experienced: history of falling in past year    Number of falls: 1  Injured during fall?: No    Feels unsteady when standing or walking?: Yes    Worried about falling?: No      Home Safety:  Patient does not have trouble with stairs inside or outside of their home  Patient has working smoke alarms and has working carbon monoxide detector  Home safety hazards include: none  Nutrition:   Current diet is Regular, No Added Salt, Low Saturated Fat and Low Carb  Medications:   Patient is currently taking over-the-counter supplements  OTC medications include: see medication list  Patient is able to manage medications  Activities of Daily Living (ADLs)/Instrumental Activities of Daily Living (IADLs):   Walk and transfer into and out of bed and chair?: Yes  Dress and groom yourself?: Yes    Bathe or shower yourself?: Yes    Feed yourself?  Yes  Do your laundry/housekeeping?: Yes  Manage your money, pay your bills and track your expenses?: Yes  Make your own meals?: Yes    Do your own shopping?: Yes    Previous Hospitalizations:   Any hospitalizations or ED visits within the last 12 months?: Yes    How many hospitalizations have you had in the last year?: 1-2    Advance Care Planning:   Living will: Yes    Durable POA for healthcare: Yes    Advanced directive: Yes    End of Life Decisions reviewed with patient: No      Cognitive Screening:   Provider or family/friend/caregiver concerned regarding cognition?: No    PREVENTIVE SCREENINGS      Cardiovascular Screening:    General: Screening Not Indicated and History Lipid Disorder      Diabetes Screening:     General: Screening Current      Colorectal Cancer Screening:     General: Risks and Benefits Discussed      Prostate Cancer Screening:    General: Screening Not Indicated      Osteoporosis Screening:    General: Screening Not Indicated      Abdominal Aortic Aneurysm (AAA) Screening:    Risk factors include: tobacco use        General: Screening Not Indicated and History AAA      Lung Cancer Screening:     General: Screening Not Indicated      Hepatitis C Screening:    General: Screening Not Indicated    Hep C Screening Accepted: No     Other Counseling Topics:   Car/seat belt/driving safety and regular weightbearing exercise         Merari Gomes,

## 2021-02-17 NOTE — PROGRESS NOTES
Daily Note     Today's date: 2021  Patient name: Elizabeth Merlos  : 1940  MRN: [id number]  Referring provider: Daralyn Opitz, DPM  Dx:   Encounter Diagnosis     ICD-10-CM    1  Gait instability  R26 81                   Subjective: Patient states he is doing good this morning and reports no changes since previous visit       Objective: See treatment diary below    NM  -Hip flexion on foam- 20 reps, 3 sec holds  -Hip abduction on foam- 20 reps, 3 sec holds  -Hip extension on foam- 20 reps, 3 sec holds  -Heel raises on foam- 20 reps, 3 sec holds    TA  -Step Taps- forward and laterally- 8" step, 20 reps, 2 UE support from firm  -Step ups- 8" step, 20 reps, forward and laterally, 2 UE support  -Hurdles - forward and laterally- 10 cycles of 10 feet, 6" hurdles 1 UE    2 minute walk test  Trial one-beginning of session- 220 feet, 1:31  Trial two-end of session- 150 feet, :59 feet      Assessment: Patient tolerated treatment well  Patient had difficulty with foam exercises and frequently required 1 UE assist to prevent fall  Patient required frequent rest breaks to prevent fatigue  Patient displayed circumduction gait pattern while completing hurdles, but corrected with verbal cueing  Patient will continue to benefit from skilled PT services to improve balance and endurance  Plan: Continue per plan of care  Progress treatment as tolerated         Precautions:  Past Medical History:   Diagnosis Date    Arthritis     [facility] fl[facility]     Chronic kidney disease     stage 3    Coronary artery disease     2 stents    Fluid retention     Gout     [facility]     pacemaker    Hypertension     Pacemaker     Pulmonary emphysema [facility]     Radiculopathy     last assessed 16     Shortness of breath     exertion    Sleep apnea     c pap           Outcome Measures 21            5x sit to stand 12 63 seconds no UE            30 second sit to stand 12 no UE            TUG 9 66 sec Gait Speed   84 m/s            2 minute walk test 150 feet            [place] 39/56            mCTSIB FTEO- 30 sec  FTEC- 30 sec   FTEO- 30 sec FTEC-7 5 sec            2 minute walk teest 150 feet                                                                Ther Ex                                                                                                                     Ther Activity                                       Gait Training                                       Modalities

## 2021-02-22 ENCOUNTER — OFFICE VISIT (OUTPATIENT)
Dept: PHYSICAL THERAPY | Facility: CLINIC | Age: 81
End: 2021-02-22
Payer: MEDICARE

## 2021-02-22 DIAGNOSIS — R26.81 GAIT INSTABILITY: Primary | ICD-10-CM

## 2021-02-22 PROCEDURE — 97112 NEUROMUSCULAR REEDUCATION: CPT

## 2021-02-22 PROCEDURE — 97110 THERAPEUTIC EXERCISES: CPT

## 2021-02-22 PROCEDURE — 97530 THERAPEUTIC ACTIVITIES: CPT

## 2021-02-22 NOTE — PROGRESS NOTES
Daily Note     Today's date: 2021  Patient name: Merlyn Day  : 1940  MRN: [id number]  Referring provider: Micki Sanz DPM  Dx:   Encounter Diagnosis     ICD-10-CM    1  Gait instability  R26 81        Start Time: 0800  Stop Time: 0845  Total time in clinic (min): 45 minutes    Subjective: Patient states that he is feeling a little winded today, patient worked all weekend plowing snow and fixing his deck  Objective: See treatment diary below    NM- progressed to 1 UE support  -Hip flexion on foam- 20 reps, 3 sec holds  -Hip abduction on foam- 20 reps, 3 sec holds  -Hip extension on foam- 20 reps, 3 sec holds  -Heel raises on foam- 20 reps, 3 sec holds    TA  -Step Taps- forward and laterally- 8" step, 20 reps, 2 UE support from firm  -Step ups- 8" step, 20 reps, forward and laterally, 2 UE support  -Hurdles - forward and laterally- 10 cycles of 10 feet, 6" hurdles 1 UE    2 minute walk test  Trial one-beginning of session- 230 feet, 2 min  Trial two-end of session- 200 feet,  1:07 seconds      Assessment: Patient tolerated treatment well  Patient was very fatigued today overall, patient challenged with his endurance during activities, patient improving with stepping activities, required more rest breaks during activities, improving with his distance walking in 2 minutes  Patient will continue to benefit from skilled PT services to improve balance and endurance  Plan: Continue per plan of care  Progress treatment as tolerated         Precautions:  Past Medical History:   Diagnosis Date    Arthritis     [facility] flutter ([facility] )     Chronic kidney disease     stage 3    Coronary artery disease     2 stents    Fluid retention     Gout     Heart failure ([facility] )     pacemaker    Hypertension     Pacemaker     Pulmonary emphysema ([facility] )     Radiculopathy     last assessed 16     Shortness of breath     exertion    Sleep apnea     c pap           Outcome Measures 21 5x sit to stand 12 63 seconds no UE            30 second sit to stand 12 no UE            TUG 9 66 sec            Gait Speed   84 m/s            2 minute walk test 150 feet            [place] 39/56            mCTSIB FTEO- 30 sec  FTEC- 30 sec   FTEO- 30 sec FTEC-7 5 sec            2 minute walk teest 150 feet                                                                Ther Ex                                                                                                                     Ther Activity                                       Gait Training                                       Modalities

## 2021-02-24 ENCOUNTER — OFFICE VISIT (OUTPATIENT)
Dept: PHYSICAL THERAPY | Facility: CLINIC | Age: 81
End: 2021-02-24
Payer: MEDICARE

## 2021-02-24 DIAGNOSIS — R26.81 GAIT INSTABILITY: Primary | ICD-10-CM

## 2021-02-24 PROCEDURE — 97112 NEUROMUSCULAR REEDUCATION: CPT | Performed by: PHYSICAL THERAPIST

## 2021-02-24 PROCEDURE — 97530 THERAPEUTIC ACTIVITIES: CPT | Performed by: PHYSICAL THERAPIST

## 2021-02-24 NOTE — PROGRESS NOTES
Daily Note     Today's date: 2021  Patient name: Ivonne Johnson  : 1940  MRN: [id number]  Referring provider: Sue Nazario DPM  Dx:   Encounter Diagnosis     ICD-10-CM    1  Gait instability  R26 81                   Subjective: Patient states that he is feeling "pretty good" today with no complaints or changes        Objective: See treatment diary below    NM- progressed to 1 UE support  -Hip flexion on foam- 20 reps, 3 sec holds  -Hip abduction on foam- 20 reps, 3 sec holds  -Hip extension on foam- 20 reps, 3 sec holds  -Heel raises on foam- 20 reps, 3 sec holds    TA  -Step Taps- forward and laterally- 8" step, 25 reps, 2 UE support from firm      2 minute walk test  Trial one-beginning of session- 210 feet, 1:50 min  Trial two-end of session- 150 feet,  56 seconds    Not Performed  -Step ups- 8" step, 20 reps, forward and laterally, 2 UE support  -Hurdles - forward and laterally- 10 cycles of 10 feet, 6" hurdles 1 UE      Assessment: Patient tolerated treatment well  Patient required additional rest breaks between step taps exercise due to increased fatigue  Patient required occasional 1 UE support to prevent LOB with hip exercises on foam  Patient able to perform 150 ft ambulation in 56 sec at end of session without rest break  Patient will continue to benefit from skilled PT services to improve his balance and endurance  Plan: Continue per plan of care  Progress treatment as tolerated         Precautions:  Past Medical History:   Diagnosis Date    Arthritis     [facility]     Chronic kidney disease     stage 3    Coronary artery disease     2 stents    Fluid retention     Gout     Heart failure ([facility] )     pacemaker    Hypertension     Pacemaker     Pulmonary emphysema ([facility] )     Radiculopathy     last assessed 16     Shortness of breath     exertion    Sleep apnea     c pap           Outcome Measures 21            5x sit to stand 12 63 seconds no UE 30 second sit to stand 12 no UE            TUG 9 66 sec            Gait Speed   84 m/s            2 minute walk test 150 feet            [place] 39/56            mCTSIB FTEO- 30 sec  FTEC- 30 sec   FTEO- 30 sec FTEC-7 5 sec            2 minute walk teest 150 feet                                                                Ther Ex                                                                                                                     Ther Activity                                       Gait Training                                       Modalities

## 2021-02-26 ENCOUNTER — IMMUNIZATIONS (OUTPATIENT)
Dept: FAMILY MEDICINE CLINIC | Facility: HOSPITAL | Age: 81
End: 2021-02-26

## 2021-02-26 ENCOUNTER — DOCUMENTATION (OUTPATIENT)
Dept: NEPHROLOGY | Facility: CLINIC | Age: 81
End: 2021-02-26

## 2021-02-26 DIAGNOSIS — Z23 ENCOUNTER FOR IMMUNIZATION: Primary | ICD-10-CM

## 2021-02-26 LAB
ALBUMIN SERPL-MCNC: 4.1 G/DL (ref 3.7–4.7)
ALP SERPL-CCNC: 65 IU/L (ref 39–117)
ALT SERPL-CCNC: 16 IU/L (ref 0–44)
AST SERPL-CCNC: 20 IU/L (ref 0–40)
BILIRUB DIRECT SERPL-MCNC: 0.19 MG/DL (ref 0–0.4)
BILIRUB SERPL-MCNC: 0.5 MG/DL (ref 0–1.2)
CHOLEST SERPL-MCNC: 116 MG/DL (ref 100–199)
EXT BILIRUBIN, UA: NEGATIVE
EXT BLOOD, UA: NEGATIVE
EXT COLOR, UA: YELLOW
EXT GLUCOSE BLD: 115
EXT GLUCOSE, UA: NEGATIVE
EXT KETONES: NEGATIVE
EXT NITRITE, UA: NEGATIVE
EXT PH, UA: 7.5
EXT PROTEIN, UA: NEGATIVE
EXT SPECIFIC GRAVITY, UA: 1.01
EXT UROBILINOGEN: 0.2
EXTERNAL BACTERIA (UA): NORMAL
EXTERNAL BUN: 23
EXTERNAL CALCIUM: 9.6
EXTERNAL CHLORIDE: 102
EXTERNAL CO2: 25
EXTERNAL CREATININE: 1.45
EXTERNAL EGFR: 45
EXTERNAL HEMATOCRIT: 38 %
EXTERNAL HEMOGLOBIN: 12.4 G/DL
EXTERNAL MAGNESIUM: 2.5
EXTERNAL PHOSPHORUS: 3.8
EXTERNAL PLATELET COUNT: 164 K/ΜL
EXTERNAL POTASSIUM: 4.4
EXTERNAL RBC (UA): NORMAL
EXTERNAL SODIUM: 142
EXTERNAL WBC (UA): NORMAL
EXTERNAL WBC: 6.9
HDLC SERPL-MCNC: 47 MG/DL
LDLC SERPL CALC-MCNC: 58 MG/DL (ref 0–99)
MICRODELETION SYND BLD/T FISH: NORMAL
PROT SERPL-MCNC: 7.1 G/DL (ref 6–8.5)
PSA SERPL-MCNC: 0.1 NG/ML (ref 0–4)
TRIGL SERPL-MCNC: 44 MG/DL (ref 0–149)
WBC # BLD EST: NORMAL 10*3/UL

## 2021-02-26 PROCEDURE — 91301 SARS-COV-2 / COVID-19 MRNA VACCINE (MODERNA) 100 MCG: CPT

## 2021-02-26 PROCEDURE — 0011A SARS-COV-2 / COVID-19 MRNA VACCINE (MODERNA) 100 MCG: CPT

## 2021-03-01 ENCOUNTER — OFFICE VISIT (OUTPATIENT)
Dept: PHYSICAL THERAPY | Facility: CLINIC | Age: 81
End: 2021-03-01
Payer: MEDICARE

## 2021-03-01 DIAGNOSIS — R26.81 GAIT INSTABILITY: Primary | ICD-10-CM

## 2021-03-01 PROCEDURE — 97112 NEUROMUSCULAR REEDUCATION: CPT

## 2021-03-01 PROCEDURE — 97530 THERAPEUTIC ACTIVITIES: CPT

## 2021-03-01 NOTE — PROGRESS NOTES
Daily Note  IE performed on   POC- 5/3/2021    Today's date: 3/1/2021  Patient name: Zachery Agent  : 1940  MRN: [id number]  Referring provider: Melissa Eugene DPM  Dx:   Encounter Diagnosis     ICD-10-CM    1  Gait instability  R26 81        Start Time: 0800  Stop Time: 0845  Total time in clinic (min): 45 minutes    Subjective: Patient states that he is feeling "pretty good" today with no complaints or changes        Objective: See treatment diary below    NM- progressed to 1 UE support  -Hip flexion on foam- 20 reps, 3 sec holds  -Hip abduction on foam- 20 reps, 3 sec holds  -Hip extension on foam- 20 reps, 3 sec holds  -Heel raises on foam- 20 reps, 3 sec holds    TA  -Step Taps- forward and laterally- 8" step, 25 reps, 1 UE support from foam  -Step Ups- Forward, Laterally, Backward- 6", 15 reps bilaterally 1 UE from foam      2 minute walk test  Trial one-beginning of session- 2 min, 300 feet  Trial two-end of session- 500 feet, 2 min    -Hurdles - forward and laterally- 10 cycles of 10 feet, 6" hurdles 1 UE      Assessment: Patient tolerated treatment well  Progressed session with addition of foam to all activities today, patient struggled with addition of foam activities today, patient challenged with endurance but improved post session walking distance indicating improved endurance  Patient will continue to benefit from skilled PT services to improve his balance and endurance  Plan: Continue per plan of care  Progress treatment as tolerated         Precautions:  Past Medical History:   Diagnosis Date    Arthritis     [facility]     Chronic kidney disease     stage 3    Coronary artery disease     2 stents    Fluid retention     Gout     [facility]     pacemaker    Hypertension     Pacemaker     [facility]sema ([facility] )     Radiculopathy     last assessed 16     Shortness of breath     exertion    Sleep apnea     c pap           Outcome Measures 2/8/21            5x sit to stand 12 63 seconds no UE            30 second sit to stand 12 no UE            TUG 9 66 sec            Gait Speed   84 m/s            2 minute walk test 150 feet            [place] 39/56            mCTSIB FTEO- 30 sec  FTEC- 30 sec   FTEO- 30 sec FTEC-7 5 sec            2 minute walk teest 150 feet                                                                Ther Ex                                                                                                                     Ther Activity                                       Gait Training                                       Modalities

## 2021-03-02 ENCOUNTER — OFFICE VISIT (OUTPATIENT)
Dept: NEPHROLOGY | Facility: CLINIC | Age: 81
End: 2021-03-02
Payer: MEDICARE

## 2021-03-02 ENCOUNTER — TELEPHONE (OUTPATIENT)
Dept: NEPHROLOGY | Facility: CLINIC | Age: 81
End: 2021-03-02

## 2021-03-02 VITALS
BODY MASS INDEX: 37.04 KG/M2 | WEIGHT: 279.5 LBS | SYSTOLIC BLOOD PRESSURE: 112 MMHG | DIASTOLIC BLOOD PRESSURE: 66 MMHG | HEIGHT: 73 IN | HEART RATE: 70 BPM

## 2021-03-02 DIAGNOSIS — I12.9 BENIGN HYPERTENSION WITH CHRONIC KIDNEY DISEASE, STAGE III (HCC): Primary | ICD-10-CM

## 2021-03-02 DIAGNOSIS — E79.0 HYPERURICEMIA: ICD-10-CM

## 2021-03-02 DIAGNOSIS — N18.30 BENIGN HYPERTENSION WITH CHRONIC KIDNEY DISEASE, STAGE III (HCC): Primary | ICD-10-CM

## 2021-03-02 DIAGNOSIS — R80.1 PERSISTENT PROTEINURIA: ICD-10-CM

## 2021-03-02 DIAGNOSIS — N18.32 STAGE 3B CHRONIC KIDNEY DISEASE (HCC): ICD-10-CM

## 2021-03-02 DIAGNOSIS — E55.9 VITAMIN D INSUFFICIENCY: ICD-10-CM

## 2021-03-02 PROCEDURE — 99214 OFFICE O/P EST MOD 30 MIN: CPT | Performed by: INTERNAL MEDICINE

## 2021-03-02 RX ORDER — ALLOPURINOL 100 MG/1
200 TABLET ORAL DAILY
Qty: 180 TABLET | Refills: 3 | Status: SHIPPED | OUTPATIENT
Start: 2021-03-02 | End: 2021-05-14 | Stop reason: SDUPTHER

## 2021-03-02 NOTE — TELEPHONE ENCOUNTER
COVID Pre-Visit Screening     1  Is this a family member screening? No  2  Have you traveled outside of your state in the past 2 weeks? No  3  Do you presently have a fever or flu-like symptoms? No  4  Do you have symptoms of an upper respiratory infection like runny nose, sore throat, or cough? No  5  Are you suffering from new headache that you have not had in the past?  No  6  Do you have/have you experienced any new shortness of breath recently? No  7  Do you have any new diarrhea, nausea or vomiting? No  8  Have you been in contact with anyone who has been sick or diagnosed with COVID-19? No  9  Do you have any new loss of taste or smell? No  10  Are you able to wear a mask without a valve for the entire visit? Yes     Patient and patient's wife were both screened, and both stated they have not been tested for COVID in the last 14 days  COVID Pre-Visit Screening     11  Is this a family member screening? Yes  12  Have you traveled outside of your state in the past 2 weeks? No  13  Do you presently have a fever or flu-like symptoms? No  14  Do you have symptoms of an upper respiratory infection like runny nose, sore throat, or cough? No  15  Are you suffering from new headache that you have not had in the past?  No  16  Do you have/have you experienced any new shortness of breath recently? No  17  Do you have any new diarrhea, nausea or vomiting? No  18  Have you been in contact with anyone who has been sick or diagnosed with COVID-19? No  19  Do you have any new loss of taste or smell? No  20  Are you able to wear a mask without a valve for the entire visit?  Yes

## 2021-03-02 NOTE — PROGRESS NOTES
NEPHROLOGY OUTPATIENT PROGRESS NOTE   Daquan Garcia [de-identified] y o  male MRN: [id number]  DATE: 3/2/2021  Reason for visit:   Chief Complaint   Patient presents with    Follow-up     CKD3     ASSESSMENT and PLAN:  CKD stage III, baseline serum creatinine 1 3-1 5  -creatinine 1 4 in February 2021 stable at baseline   -  UA in February 2021 shows no significant hematuria or proteinuria  11 to 30 WBCs, he denies any urinary symptoms  -CKD likely secondary to long-term hypertension causing hypertensive nephrosclerosis, morbid obesity   -Avoid nephrotoxins or NSAIDs     -will do repeat UPC ratio, BMP before next visit     Proteinuria, last UPC ratio  Unremarkable in February 2021   - repeat UPC ratio as mentioned above  -Could be secondary to underlying hypertension +/- obesity related secondary FSGS component   -May Consider ACE inhibitor eventually if renal function remains stable and proteinuria worsening      History of gross hematuria  -Denies any recent episodes of gross hematuria   Follow up with Urology     Hypertension  -  BP well controlled in the office today  - Salt restricted diet   - continue felodipine   Lasix as below     Leg edema,   -  He continued to lose weight and lately weight at home seems to be around 275 pounds  -  Remains on stable dose of Lasix 40 mg in a m  20 mg in p m  Isis Bridges -  Leg edema has overall improved  -continue to monitor daily weight and call back if he has weight gain greater than 5 lb in 2 to 3 days    -echocardiogram in September 2020 shows EF 55 percent, moderate to severe aortic stenosis, moderate pulmonary hypertension      Secondary hyperparathyroidism,  Prior PTH 96 in the setting of vitamin-D insufficiency  Continue to monitor     Vitamin-D insufficiency,  Prior level 25 5 in July 2020  Currently takin  Oscal D  Repeat vitamin-D level before next visit  Hyperuricemia with history of gout  -serum uric acid  Improving 6 9 in October 2020   On allopurinol 200 mg daily       Morbid obesity, he has been losing weight as mentioned above     CAD, status post PTCA in April 2019 and again in May 2019      Diagnoses and all orders for this visit:    Benign hypertension with chronic kidney disease, stage III ([facility] )    Stage 3b chronic kidney disease  -     Basic metabolic panel; Future  -     CBC; Future  -     Phosphorus; Future  -     PTH, intact; Future  -     Microalbumin / creatinine urine ratio; Future  -     Magnesium; Future    Persistent proteinuria    Hyperuricemia  -     allopurinol (ZYLOPRIM) 100 mg tablet; Take 2 tablets (200 mg total) by mouth daily  -     Uric acid; Future    Vitamin D insufficiency  -     Vitamin D 25 hydroxy; Future        SUBJECTIVE / HPI:  Patient is 68-year-old male with significant medical issues of hypertension for many years, CAD, status post PTCA, status post pacemaker placement, morbidly obese, CKD stage III with baseline creatinine 1 3-1 5, gout, AFib, JOVI on BiPAP regular follow-up of CKD  Since last visit, he continued to lose   Weight and lately weight seems to be around 275 pounds at home  He was hospitalized in  September 2020 with worsening dyspnea, found to have one of two sets GNR bacteremia? Source was diuresed aggressively  Now remains on stable dose of Lasix 40 mg in a m / 20 mg in p m        He denies any urinary complaint at this time   His leg edema is overall stable  Patient is unable to do exercise due to back pain/knee pain issues  No recent NSAID exposure  REVIEW OF SYSTEMS:  More than 10 point review of systems were obtained and discussed in length with the patient  Complete review of systems were negative / unremarkable except mentioned above  PHYSICAL EXAM:  Vitals:    03/02/21 0952 03/02/21 1036   BP: 120/62 112/66   BP Location: Left arm    Patient Position: Sitting    Cuff Size: Large    Pulse: 70    Weight: 127 kg (279 lb 8 oz)    Height: 6' 1" (1 854 m)      Body mass index is 36 88 kg/m²      Physical Exam  Vitals signs reviewed  Constitutional:       Appearance: He is well-developed  HENT:      Head: Normocephalic and atraumatic  Right Ear: External ear normal       Left Ear: External ear normal       Nose: Nose normal    Eyes:      General: No scleral icterus  Extraocular Movements: Extraocular movements intact  Pupils: Pupils are equal, round, and reactive to light  Cardiovascular:      Comments: S1, S2 present  Pulmonary:      Effort: Pulmonary effort is normal  No respiratory distress  Breath sounds: Normal breath sounds  No wheezing or rales  Abdominal:      General: Bowel sounds are normal  There is no distension  Palpations: Abdomen is soft  Tenderness: There is no abdominal tenderness  Musculoskeletal:         General: No tenderness  Right lower leg: No edema  Left lower leg: No edema  Lymphadenopathy:      Cervical: No cervical adenopathy  Skin:     General: Skin is warm and dry  Findings: No rash  Neurological:      Mental Status: He is alert and oriented to person, place, and time     Psychiatric:         Behavior: Behavior normal          PAST MEDICAL HISTORY:  Past Medical History:   Diagnosis Date    Arthritis     Atrial flutter ([facility] )     Chronic kidney disease     stage 3    Coronary artery disease     2 stents    Fluid retention     Gout     Heart failure (HCC)     pacemaker    Hypertension     Pacemaker     [facility]     Radiculopathy     last assessed 1/28/16     Shortness of breath     exertion    Sleep apnea     c pap       PAST SURGICAL HISTORY:  Past Surgical History:   Procedure Laterality Date    ANGIOPLASTY      x2 2 stents and then replaced    CARDIAC PACEMAKER PLACEMENT      pacemaker permanent placement dual chamber / last assessed 4/7/14 / implantation     CARDIAC SURGERY      pacemaker    CHOLECYSTECTOMY      CORONARY ANGIOPLASTY WITH STENT PLACEMENT      EPIDURAL BLOCK INJECTION N/A 2016    Procedure: BLOCK / INJECTION EPIDURAL STEROID LUMBAR  L4-5;  Surgeon: Karime Haas MD;  [facility];  Service:     EPIDURAL BLOCK INJECTION N/A 2019    Procedure: L4 L5 Lumbar Epidural Steroid Injection;  Surgeon: Karime Haas MD;  Location: [facility] MAIN OR;  Service: Pain Management     EYE SURGERY      cataract left    KNEE ARTHROSCOPY W/ MENISCAL REPAIR Left     LUMBAR EPIDURAL INJECTION N/A 3/17/2016    Procedure: BLOCK / INJECTION LUMBAR  L4-5  (C-ARM); Surgeon: Karime Haas MD;  Location[facility] MAIN OR;  Service:        SOCIAL HISTORY:  Social History     Substance and Sexual Activity   Alcohol Use Never    Frequency: Never     Social History     Substance and Sexual Activity   Drug Use No     Social History     Tobacco Use   Smoking Status Former Smoker    Packs/day: 1 00    Years: 50 00    Pack years: 50 00    Types: Cigarettes    Quit date: 3/27/2019    Years since quittin 9   Smokeless Tobacco Never Used   Tobacco Comment    quit 2021       FAMILY HISTORY:  Family History   Problem Relation Age of Onset    Cancer Mother 80    Heart disease Mother     Hypertension Mother     Heart disease Father     Diabetes Neg Hx     Stroke Neg Hx        MEDICATIONS:    Current Outpatient Medications:     albuterol (Ventolin HFA) 90 mcg/act inhaler, Inhale 2 puffs every 6 (six) hours as needed for wheezing, Disp: 3 Inhaler, Rfl: 3    allopurinol (ZYLOPRIM) 100 mg tablet, Take 2 tablets (200 mg total) by mouth daily, Disp: 180 tablet, Rfl: 3    ascorbic acid (VITAMIN C) 500 mg tablet, Take 500 mg by mouth daily  , Disp: , Rfl:     B Complex Vitamins (B COMPLEX 1 PO), Take 1 tablet by mouth daily  , Disp: , Rfl:     Biotin (BIOTIN 5000) 5 MG CAPS, Take 1,000 mcg by mouth daily  , Disp: , Rfl:     calcium carbonate-vitamin D (OSCAL-D) 500 mg-200 units per tablet, Take 2 tablets by mouth daily at bedtime  , Disp: , Rfl:     clopidogrel (PLAVIX) 75 mg tablet, Take 1 tablet (75 mg total) by mouth daily, Disp: 90 tablet, Rfl: 1    colchicine (COLCRYS) 0 6 mg tablet, Take 1 tablet (0 6 mg total) by mouth 2 (two) times a day Take twice daily x 7 days as needed for gout, Disp: 30 tablet, Rfl: 5    Cranberry 1000 MG CAPS, Take 1 tablet by mouth 2 (two) times a day , Disp: , Rfl:     cyanocobalamin 1000 MCG tablet, Take 100 mcg by mouth daily, Disp: , Rfl:     dextromethorphan-guaiFENesin (ROBITUSSIN DM)  mg/5 mL syrup, Take 10 mL by mouth every 4 (four) hours as needed for cough, Disp: 118 mL, Rfl: 0    felodipine (PLENDIL) 5 mg 24 hr tablet, Take 1 tablet (5 mg total) by mouth daily, Disp: 90 tablet, Rfl: 1    finasteride (PROSCAR) 5 mg tablet, Take 1 tablet (5 mg total) by mouth daily, Disp: 90 tablet, Rfl: 3    Flaxseed, Linseed, (EQL FLAX SEED OIL) 1000 MG CAPS, Take by mouth daily  , Disp: , Rfl:     fluocinonide (LIDEX) 0 05 % cream, Apply topically 2 (two) times a day Prn leg rash, short term (Patient taking differently: Apply topically as needed Prn leg rash, short term), Disp: 120 g, Rfl: 2    fluticasone-umeclidinium-vilanterol (Trelegy Ellipta) 100-62 5-25 MCG/INH inhaler, Inhale 1 puff daily Rinse mouth after use , Disp: , Rfl:     furosemide (LASIX) 20 mg tablet, TAKE 2 TABLETS (40 MG) IN AM AND 1 TABLET (20 MG) IN PM, Disp: 270 tablet, Rfl: 3    glucosamine-chondroitin 500-400 MG tablet, Take 1 tablet by mouth 2 (two) times a day , Disp: , Rfl:     ipratropium-albuterol (DUO-NEB) 0 5-2 5 mg/3 mL nebulizer solution, USE 1 VIAL IN NEBULIZER 3 TIMES DAILY, Disp: 90 vial, Rfl: 11    lactase (LACTAID) 3,000 units tablet, Take 3,000 Units by mouth as needed  , Disp: , Rfl:     Omega-3 Fatty Acids (FISH OIL) 1,000 mg, Take 1,000 mg by mouth 2 (two) times a day , Disp: , Rfl:     PARoxetine (PAXIL) 20 mg tablet, Take 1 tablet (20 mg total) by mouth daily, Disp: 90 tablet, Rfl: 1    rivaroxaban (XARELTO) 15 mg tablet, Take 1 tablet (15 mg total) by mouth daily with breakfast (Patient taking differently: Take 20 mg by mouth daily at bedtime ), Disp: , Rfl:     simvastatin (ZOCOR) 20 mg tablet, Take 1 tablet (20 mg total) by mouth daily at bedtime, Disp: 90 tablet, Rfl: 1    linaCLOtide (Linzess) 72 MCG CAPS, Take 1 capsule by mouth every morning , 30 minutes before first meal (Patient not taking: Reported on 3/2/2021), Disp: 30 capsule, Rfl: 5    Lab Results:   Results for orders placed or performed in visit on 02/26/21   CBC and differential   Result Value Ref Range    WBC 6 9     External Hemoglobin 12 4 g/dL    Hematocrit 38 %    External Platelets 935 K/µL   Urinalysis with microscopic   Result Value Ref Range    EXTERNAL COLOR,UA yellow     Spec Grav, UA (Ref:1 003-1 030) 1 008     Glucose, UA (Ref: Negative) negative     Ketones, UA (Ref: Negative) negative     Blood, UA negative     Nitrite, UA (Ref: Negative) negative      Leukocytes, UA (Ref: Negative) 1+     pH, UA (Ref: 4 5-8 0) 7 5     Protein, UA (Ref: Negative) negative     Bilirubin, UA (Ref: Negative) negative     Urobilinogen, UA (Ref: 0 2- 1 0) 0 2     RBC, UA 0-2     EXTERNAL WBC, UA 11-30     EXTERNAL BACTERIA, UA none  seen    Phosphorus   Result Value Ref Range    EXTERNAL PHOSPHORUS 3 8    Magnesium   Result Value Ref Range    EXTERNAL MAGNESIUM 2 5    Basic metabolic panel   Result Value Ref Range    SODIUM 142     POTASSIUM 4 4     CHLORIDE 102     CO2 25     BUN 23     CREATININE 1 45     Glucose 115     EXTERNAL CALCIUM 9 6     EXTERNAL EGFR 45

## 2021-03-03 ENCOUNTER — OFFICE VISIT (OUTPATIENT)
Dept: PHYSICAL THERAPY | Facility: CLINIC | Age: 81
End: 2021-03-03
Payer: MEDICARE

## 2021-03-03 DIAGNOSIS — R26.81 GAIT INSTABILITY: Primary | ICD-10-CM

## 2021-03-03 PROCEDURE — 97110 THERAPEUTIC EXERCISES: CPT | Performed by: PHYSICAL THERAPIST

## 2021-03-03 PROCEDURE — 97112 NEUROMUSCULAR REEDUCATION: CPT | Performed by: PHYSICAL THERAPIST

## 2021-03-03 NOTE — PROGRESS NOTES
Daily Note  IE performed on 3/8    Today's date: 3/3/2021  Patient name: Facundo Deng  : 1940  MRN: [id number]  Referring provider: Garrett Pollard DPM  Dx:   Encounter Diagnosis     ICD-10-CM    1  Gait instability  R26 81                   Subjective: Patient states that he is feeling "pretty good" today, arrived to PT 5 min late      Objective: See treatment diary below    NM- progressed to 1 UE support  -Hip flexion on foam- 20 reps, 3 sec holds  -Hip abduction on foam- 20 reps, 3 sec holds  -Hip extension on foam- 20 reps, 3 sec holds  -Heel raises on foam- 20 reps, 3 sec holds      2 minute walk test  Trial one-beginning of session- 1:30 min, 200 feet  Trial two-end of session- 500 feet, 2 min    -Hurdles - forward- 4 cycles of 10 feet x3, 6" hurdles 0 UE    Not Performed:  TA  -Step Taps- forward and laterally- 8" step, 25 reps, 1 UE support from foam  -Step Ups- Forward, Laterally, Backward- 6", 15 reps bilaterally 1 UE from foam    Assessment: Patient tolerated treatment well  Patient challenged with endurance and requires rest breaks between each exercise  Patient reported feeling "extra out of breath today" and hurdles activities were broken up into sets of 4 reps  Patient performed josé exercises without UE support  Patient will continue to benefit from skilled PT services to improve his balance and endurance  Plan: Continue per plan of care  Progress treatment as tolerated         Precautions:  Past Medical History:   Diagnosis Date    Arthritis     [facility]     Chronic kidney disease     stage 3    Coronary artery disease     2 stents    Fluid retention     Gout     [facility]     pacemaker    Hypertension     Pacemaker     [facility]ysema [facility]     Radiculopathy     last assessed 16     Shortness of breath     exertion    Sleep apnea     c pap           Outcome Measures 21            5x sit to stand 12 63 seconds no UE            30 second sit to stand 12 no UE            TUG 9 66 sec            Gait Speed   84 m/s            2 minute walk test 150 feet            [place] 39/56            mCTSIB FTEO- 30 sec  FTEC- 30 sec   FTEO- 30 sec FTEC-7 5 sec            2 minute walk teest 150 feet                                                                Ther Ex                                                                                                                     Ther Activity                                       Gait Training                                       Modalities

## 2021-03-10 ENCOUNTER — OFFICE VISIT (OUTPATIENT)
Dept: PHYSICAL THERAPY | Facility: CLINIC | Age: 81
End: 2021-03-10
Payer: MEDICARE

## 2021-03-10 DIAGNOSIS — R26.81 GAIT INSTABILITY: Primary | ICD-10-CM

## 2021-03-10 PROCEDURE — 97112 NEUROMUSCULAR REEDUCATION: CPT | Performed by: PHYSICAL THERAPIST

## 2021-03-10 PROCEDURE — 97110 THERAPEUTIC EXERCISES: CPT | Performed by: PHYSICAL THERAPIST

## 2021-03-10 NOTE — PROGRESS NOTES
Daily Note/Progress Note  IE performed on     Today's date: 3/10/2021  Patient name: Chance Conteh  : 1940  MRN: [id number]  Referring provider: Georges Ga DPM  Dx:   Encounter Diagnosis     ICD-10-CM    1  Gait instability  R26 81                   Subjective: Patient states that he is "not feeling up to it" today, but agreed to therapy        Objective: See treatment diary below    Objective measures:  5xSTS- 14 12 sec  30 sec STS- 10 reps no UE  TUG- 9 19 sec  Gait Speed- 0 77 m/s  2 Min Walk Test- 220 ft in 1:35  MOFFETT- 44/56  mCTSIB- FTEO (30 sec), FTEC (30 sec), FTEO w/ foam (30 sec), FTEC w/ foam (7 61 sec)          Assessment: Patient tolerated treatment well  Patient showed improvement with MOFFETT, improving score from 39/56 to 44/56, improving him to low risk of fall  He also showed improvement with his 2 min walk test, increasing distance to 220 ft, but needed to stop after 1:35 sec  His values for TUG and mCTSIB remained similar to original testing values  Patient had difficulty with 4xSTS, increasing time from 12 63 sec to 14 12 sec  Patient also challenged with 30 sec STS test, increasing time from 12 reps to 10 reps, showing decreased functional endurance  Patient will benefit from skilled PT services to improve his balance, endurance, and reduce his risk of falls  Goals  Goals  STG 30 days    1  Patient will improve static balance with feet together [employer] surface to 30 seconds indicating reduction in fall risk- NOT MET  2  Patient will display 2 3  second improvement with overall score of less than 12 seconds  with TUG test or lower with noted improvement being Minimal Detectable Change pre current research standards with fall risk - MET  3  Patient will achieve 45/56 MOFFETT score with minimal improve by 6 points or more demonstrating Minimal Detectable change per current research standards for this objective test which assess fall risk  - NOT MET  4   Patient will increase gait speed to > 0 70 m/s with 10 Meter Walk test, demonstrating Minimal Detectable change per current research standards for this objective test which assess fall risk - MET  5  Patient will perform  5 x sit to stand test with overall reduction by seconds to less than 13 seconds indicating improvement with functional endurance- NOT MET  6  Patient will increase ABC score to 60% or higher to reduce fall risk and improve balance confidence  LT days   1  Patient will score low risk for falls with 3/4 fall risk measures   2  Patient will be able to perform floor transfer without physical assistance   3  Patient will be able to carry objects without loss of balance  4  Patient will be able to ambulate outdoors without any loss of balance  5  Patient will increase ABC score to 80% confidence to reduce fall risk and improve balance confidence  6  Patient will increase gait speed to 1 1 m/s or higher to reduce fall risk       Cut off score   All date taken from [facility] or Rehab Measures    MOFFETT test:                                                          5 x STS Test:  MDC: 6 points                                                              MDC: 2 3 seconds   age norms                                                                    Age Norms   61-76 year old = M: 54, F: 53                                       64-65 year old: 11 4 seconds   66-77 year old = M 47,  F: 53                                       64-65 year old: 12 6 seconds    80-80 year old = M46,   F: 53                                       64-65 year old: 14 8 seconds     TUG test:                                                                     10 Meter Walk Test:  MDC: 4 14 seconds                                                      MDC:  59 ft/sec  Cut off score for Falls                                                  Age Norms  > 13 5 seconds community dwelling adults                20-29; M: 4 56 ft/sec F: 4 62 ft/sec  > 32 2 Frail Elderly                                                     30-39: M 4 76 ft/sec  F: 4 68 ft/sec                                                                                       40-49: M: 4 79 ft/sec  F: 4 62 ft/sec  6 Minute Walk Test                                                      50-59: M: 4 76 ft/sec  F: 4 56 ft/sec  MDC: 190 feet                                                              60-69: M: 4 56 ft/sec  F: 4 26 ft/sec  Age Norms                                                                   70-+    M: 4 36 ft/sec  F: 4 16 ft/sec  60-69:    M: 1876 F: 6589  70-79:    M: 1729 F: 1545  80-89 +: M: 1546 F; 1286         Plan: Continue per plan of care  Progress treatment as tolerated  Precautions:  Past Medical History:   Diagnosis Date    Arthritis     [facility] ([facility] )     Chronic kidney disease     stage 3    Coronary artery disease     2 stents    Fluid retention     Gout     Heart failure ([facility] )     pacemaker    Hypertension     Pacemaker     Pulmonary emphysema ([facility] )     Radiculopathy     last assessed 1/28/16     Shortness of breath     exertion    Sleep apnea     c pap           Outcome Measures 2/8/21 3/10/21           5x sit to stand 12 63 seconds no UE 14 12 sec           30 second sit to stand 12 no UE 10 no UE           TUG 9 66 sec 9 19 sec           Gait Speed   84 m/s 0 77 m/s           2 minute walk test 150 feet 220 ft in 1:35           [place] 39/56 44/56           mCTSIB FTEO- 30 sec  FTEC- 30 sec   FTEO- 30 sec FTEC-7 5 sec FTEO- 30 sec  FTEC- 30 sec  FTEO w/ foam- 30 sec  FTEC w/ foam- 7 61 sec           2 minute walk teest 150 feet                                                                Ther Ex                                                                                                                     Ther Activity                                       Gait Training                                       Modalities

## 2021-03-12 ENCOUNTER — OFFICE VISIT (OUTPATIENT)
Dept: PHYSICAL THERAPY | Facility: CLINIC | Age: 81
End: 2021-03-12
Payer: MEDICARE

## 2021-03-12 DIAGNOSIS — R26.81 GAIT INSTABILITY: Primary | ICD-10-CM

## 2021-03-12 PROCEDURE — 97110 THERAPEUTIC EXERCISES: CPT | Performed by: PHYSICAL THERAPIST

## 2021-03-12 PROCEDURE — 97112 NEUROMUSCULAR REEDUCATION: CPT | Performed by: PHYSICAL THERAPIST

## 2021-03-12 NOTE — PROGRESS NOTES
Daily Note     Today's date: 3/12/2021  Patient name: Mednez Wright  : 1940  MRN: [id number]  Referring provider: Caroleen Sandifer, DPM  Dx:   Encounter Diagnosis     ICD-10-CM    1  Gait instability  R26 81                   Subjective: Patient reports to therapy, stating he is feeling good today and much better than last visit      Objective: See treatment diary below    NM- progressed to 1 UE support  -Hip flexion on foam- 20 reps, 3 sec holds  -Hip abduction on foam- 20 reps, 3 sec holds  -Hip extension on foam- 20 reps, 3 sec holds  -Step Taps- forward and laterally- 8" step, 25 reps, 1 UE support from foam      2 minute walk test  Trial one-beginning of session- Not Performed  Trial two-end of session- 150 feet, 57 sec    -Hurdles - forward - 6 cycles of 10 feet, 6" hurdles 0 UE      Assessment: Patient tolerated treatment well  Patient challenged with endurance, frequently becoming SOB and needing rest breaks  Patient able to perform josé exercises without UE support, but needs verbal cueing to prevent circumduction around hurdles  Patient will continue to benefit from skilled PT services to improve his balance and endurance  Plan: Continue per plan of care  Precautions:  Past Medical History:   Diagnosis Date    Arthritis     [facility]     Chronic kidney disease     stage 3    Coronary artery disease     2 stents    Fluid retention     Gout     [facility] failure ([facility]     pacemaker    Hypertension     Pacemaker     [facility] ([facility] )     Radiculopathy     last assessed 16     Shortness of breath     exertion    Sleep apnea     c pap           Outcome Measures 2/8/21 3/10/21           5x sit to stand 12 63 seconds no UE 14 12 sec           30 second sit to stand 12 no UE 10 no UE           TUG 9 66 sec 9 19 sec           Gait Speed   84 m/s 0 77 m/s           2 minute walk test 150 feet 220 ft in 1:35           [place] 39/56 44/56           mCTSIB FTEO- 30 sec  FTEC- 30 sec   FTEO- 30 sec FTEC-7 5 sec FTEO- 30 sec  FTEC- 30 sec  FTEO w/ foam- 30 sec  FTEC w/ foam- 7 61 sec           2 minute walk teest 150 feet                                                                Ther Ex                                                                                                                     Ther Activity                                       Gait Training                                       Modalities

## 2021-03-15 ENCOUNTER — OFFICE VISIT (OUTPATIENT)
Dept: PHYSICAL THERAPY | Facility: CLINIC | Age: 81
End: 2021-03-15
Payer: MEDICARE

## 2021-03-15 DIAGNOSIS — R26.81 GAIT INSTABILITY: Primary | ICD-10-CM

## 2021-03-15 PROCEDURE — 97110 THERAPEUTIC EXERCISES: CPT | Performed by: PHYSICAL THERAPIST

## 2021-03-15 PROCEDURE — 97112 NEUROMUSCULAR REEDUCATION: CPT | Performed by: PHYSICAL THERAPIST

## 2021-03-15 NOTE — PROGRESS NOTES
Daily Note     Today's date: 3/15/2021  Patient name: Lana Duncan  : 1940  MRN: [id number]  Referring provider: Rosaline Broussard DPM  Dx:   Encounter Diagnosis     ICD-10-CM    1  Gait instability  R26 81                   Subjective: Patient reports to therapy, stating he is feeling good today       Objective: See treatment diary below    NM- progressed to 1 UE support  -Hip flexion on foam- 25 reps, 3 sec holds  -Hip abduction on foam- 25 reps, 3 sec holds  -Hip extension on foam- 25 reps, 3 sec holds  -Sidestepping on foam- 6 ft x10 laps      2 minute walk test  Trial one-beginning of session- 300 ft, 2 min  Trial two-end of session- 150 feet, 58 sec    -Hurdles - forward - 10 cycles of 10 feet, 6" hurdles 0 UE  -Hurdles - lateral - 6 cycles of 10 ft, 6" hurdles, 0 UE      Assessment: Patient tolerated treatment well  Patient continues to be challenged with SOB and requires frequent rest breaks  Patient also challenged with lateral hurdles, compensating by turning trunk toward josé, but was able to correct with verbal cueing  Sidestepping on foam added to challenge patient balance  Patient will benefit from skilled PT services to improve his balance and endurance  Plan: Continue per plan of care  Precautions:  Past Medical History:   Diagnosis Date    Arthritis     [facility]     Chronic kidney disease     stage 3    Coronary artery disease     2 stents    Fluid retention     Gout     [facility]     pacemaker    Hypertension     Pacemaker     [facility]ma ([facility] )     Radiculopathy     last assessed 16     Shortness of breath     exertion    Sleep apnea     c pap           Outcome Measures 2/8/21 3/10/21           5x sit to stand 12 63 seconds no UE 14 12 sec           30 second sit to stand 12 no UE 10 no UE           TUG 9 66 sec 9 19 sec           Gait Speed   84 m/s 0 77 m/s           2 minute walk test 150 feet 220 ft in 1:35           Nayana Epperson 39/56 44/56           mCTSIB FTEO- 30 sec  FTEC- 30 sec   FTEO- 30 sec FTEC-7 5 sec FTEO- 30 sec  FTEC- 30 sec  FTEO w/ foam- 30 sec  FTEC w/ foam- 7 61 sec           2 minute walk teest 150 feet                                                                Ther Ex                                                                                                                     Ther Activity                                       Gait Training                                       Modalities

## 2021-03-17 ENCOUNTER — OFFICE VISIT (OUTPATIENT)
Dept: PHYSICAL THERAPY | Facility: CLINIC | Age: 81
End: 2021-03-17
Payer: MEDICARE

## 2021-03-17 DIAGNOSIS — R26.81 GAIT INSTABILITY: Primary | ICD-10-CM

## 2021-03-17 PROCEDURE — 97112 NEUROMUSCULAR REEDUCATION: CPT | Performed by: PHYSICAL THERAPIST

## 2021-03-17 PROCEDURE — 97110 THERAPEUTIC EXERCISES: CPT | Performed by: PHYSICAL THERAPIST

## 2021-03-17 NOTE — PROGRESS NOTES
Daily Note     Today's date: 3/17/2021  Patient name: Kayley Ace  : 1940  MRN: [id number]  Referring provider: Radha Bridges DPM  Dx:   Encounter Diagnosis     ICD-10-CM    1  Gait instability  R26 81                   Subjective: Patient reports to therapy stating he was having some back pain from working yesterday, but it is not currently hurting       Objective: See treatment diary below    NM- progressed to 2 finger support  -Hip flexion on foam- 25 reps, 3 sec holds  -Hip abduction on foam- 25 reps, 3 sec holds  -Hip extension on foam- 25 reps, 3 sec holds  -STS w/ foam- 15 reps, 1 airex on chair  -Sidestepping w/ foam: 5 laps of 6 ft, 1 UE      2 minute walk test  Trial one-beginning of session- 300 ft, 2 min  Trial two-end of session- 130 feet, 48 sec    -Hurdles - forward - 8 cycles of 10 feet, 6" hurdles 0 UE        Assessment: Patient tolerated treatment well  Patient continues to be challenged with SOB and requires frequent rest breaks  Patient challenged with sidestepping on foam, requiring UE support to maintain balance  STS on foam added today to challenge balance with transfers, which patient had 2 LOB posterior but was able to regain balance  Patient will benefit from skilled PT services to improve his balance and endurance  Plan: Continue per plan of care  Precautions:  Past Medical History:   Diagnosis Date    Arthritis     [facility] flutter ([facility] )     Chronic kidney disease     stage 3    Coronary artery disease     2 stents    Fluid retention     Gout     Heart failure ([facility] )     pacemaker    Hypertension     Pacemaker     Pulmonary emphysema ([facility] )     Radiculopathy     last assessed 16     Shortness of breath     exertion    Sleep apnea     c pap           Outcome Measures 2/8/21 3/10/21           5x sit to stand 12 63 seconds no UE 14 12 sec           30 second sit to stand 12 no UE 10 no UE           TUG 9 66 sec 9 19 sec           Gait Speed   84 m/s 0 77 m/s           2 minute walk test 150 feet 220 ft in 1:35           [place] 39/56 44/56           mCTSIB FTEO- 30 sec  FTEC- 30 sec   FTEO- 30 sec FTEC-7 5 sec FTEO- 30 sec  FTEC- 30 sec  FTEO w/ foam- 30 sec  FTEC w/ foam- 7 61 sec           2 minute walk teest 150 feet                                                                Ther Ex                                                                                                                     Ther Activity                                       Gait Training                                       Modalities

## 2021-03-22 ENCOUNTER — OFFICE VISIT (OUTPATIENT)
Dept: PHYSICAL THERAPY | Facility: CLINIC | Age: 81
End: 2021-03-22
Payer: MEDICARE

## 2021-03-22 DIAGNOSIS — R26.81 GAIT INSTABILITY: Primary | ICD-10-CM

## 2021-03-22 PROCEDURE — 97110 THERAPEUTIC EXERCISES: CPT | Performed by: PHYSICAL THERAPIST

## 2021-03-22 PROCEDURE — 97112 NEUROMUSCULAR REEDUCATION: CPT | Performed by: PHYSICAL THERAPIST

## 2021-03-22 NOTE — PROGRESS NOTES
Daily Note     Today's date: 3/22/2021  Patient name: Zachery Miller  : 1940  MRN: [id number]  Referring provider: Eduar Rascon DPM  Dx:   Encounter Diagnosis     ICD-10-CM    1  Gait instability  R26 81                   Subjective: Patient reports to therapy with no new complaints       Objective: See treatment diary below    NM- progressed to 2 finger support  -Hip flexion on foam- 25 reps, 3 sec holds  -Hip abduction on foam- 25 reps, 3 sec holds  -Hip extension on foam- 25 reps, 3 sec holds  -STS w/ foam- 20 reps, 1 airex on chairs        2 minute walk test  Trial one-beginning of session- not performed  Trial two-end of session- 130 feet, 53 sec    -Hurdles - forward - 8 cycles of 10 feet, 6" hurdles 0 UE  -Hurdles - lateral - 4 cycles of 10 feet, 6" hurdles 0 UE      Assessment: Patient tolerated treatment well  Patient required frequent rest breaks due to SOB  Patient fatigued by end of treatment and was only able to perform 4 cycles of lateral hurdles  Patient challenged with lateral stepping, compensating turning trunk toward hurdles  Patient will benefit from skilled PT services to improve his balance and endurance  Plan: Continue per plan of care  Precautions:  Past Medical History:   Diagnosis Date    Arthritis     [facility] fl[facility] ([facility]     Chronic kidney disease     stage 3    Coronary artery disease     2 stents    Fluid retention     Gout     [facility] failure ([facility] )     pacemaker    Hypertension     Pacemaker     Pulmonary emphysema ([facility]     Radiculopathy     last assessed 16     Shortness of breath     exertion    Sleep apnea     c pap           Outcome Measures 2/8/21 3/10/21           5x sit to stand 12 63 seconds no UE 14 12 sec           30 second sit to stand 12 no UE 10 no UE           TUG 9 66 sec 9 19 sec           Gait Speed   84 m/s 0 77 m/s           2 minute walk test 150 feet 220 ft in 1:35           [place] 39/56 44/56           mCTSIB FTEO- 30 sec  FTEC- 30 sec   FTEO- 30 sec FTEC-7 5 sec FTEO- 30 sec  FTEC- 30 sec  FTEO w/ foam- 30 sec  FTEC w/ foam- 7 61 sec           2 minute walk teest 150 feet                                                                Ther Ex                                                                                                                     Ther Activity                                       Gait Training                                       Modalities

## 2021-03-23 ENCOUNTER — OFFICE VISIT (OUTPATIENT)
Dept: FAMILY MEDICINE CLINIC | Facility: CLINIC | Age: 81
End: 2021-03-23
Payer: MEDICARE

## 2021-03-23 VITALS
DIASTOLIC BLOOD PRESSURE: 64 MMHG | SYSTOLIC BLOOD PRESSURE: 138 MMHG | RESPIRATION RATE: 20 BRPM | BODY MASS INDEX: 37.24 KG/M2 | HEART RATE: 70 BPM | HEIGHT: 73 IN | WEIGHT: 281 LBS | TEMPERATURE: 99.2 F | OXYGEN SATURATION: 94 %

## 2021-03-23 DIAGNOSIS — M54.50 ACUTE RIGHT-SIDED LOW BACK PAIN WITHOUT SCIATICA: ICD-10-CM

## 2021-03-23 DIAGNOSIS — N39.0 URINARY TRACT INFECTION WITHOUT HEMATURIA, SITE UNSPECIFIED: ICD-10-CM

## 2021-03-23 DIAGNOSIS — R10.9 FLANK PAIN: Primary | ICD-10-CM

## 2021-03-23 LAB
SL AMB  POCT GLUCOSE, UA: ABNORMAL
SL AMB LEUKOCYTE ESTERASE,UA: ABNORMAL
SL AMB POCT BILIRUBIN,UA: ABNORMAL
SL AMB POCT BLOOD,UA: ABNORMAL
SL AMB POCT CLARITY,UA: ABNORMAL
SL AMB POCT COLOR,UA: YELLOW
SL AMB POCT KETONES,UA: ABNORMAL
SL AMB POCT NITRITE,UA: ABNORMAL
SL AMB POCT PH,UA: 7
SL AMB POCT SPECIFIC GRAVITY,UA: 1.02
SL AMB POCT URINE PROTEIN: ABNORMAL
SL AMB POCT UROBILINOGEN: 0.2

## 2021-03-23 PROCEDURE — 99213 OFFICE O/P EST LOW 20 MIN: CPT | Performed by: FAMILY MEDICINE

## 2021-03-23 PROCEDURE — 81003 URINALYSIS AUTO W/O SCOPE: CPT | Performed by: FAMILY MEDICINE

## 2021-03-23 RX ORDER — SULFAMETHOXAZOLE AND TRIMETHOPRIM 800; 160 MG/1; MG/1
1 TABLET ORAL EVERY 12 HOURS SCHEDULED
Qty: 14 TABLET | Refills: 0 | Status: SHIPPED | OUTPATIENT
Start: 2021-03-23 | End: 2021-03-30

## 2021-03-23 NOTE — PROGRESS NOTES
Assessment/Plan:    1  Flank pain  -     POCT urine dip auto non-scope  -     sulfamethoxazole-trimethoprim (BACTRIM DS) 800-160 mg per tablet; Take 1 tablet by mouth every 12 (twelve) hours for 7 days  -     Urine culture    2  Acute right-sided low back pain without sciatica  -     POCT urine dip auto non-scope  -     sulfamethoxazole-trimethoprim (BACTRIM DS) 800-160 mg per tablet; Take 1 tablet by mouth every 12 (twelve) hours for 7 days  -     Urine culture    3  Urinary tract infection without hematuria, site unspecified  -     sulfamethoxazole-trimethoprim (BACTRIM DS) 800-160 mg per tablet; Take 1 tablet by mouth every 12 (twelve) hours for 7 days  -     Urine culture    On exam he did noit have any tenderness in the abd, no issues with cva tenderness in back etc   I think this is more UTI than kidney stone  I will treat wiuth abx if the symptoms persist or worsen will consider ct scan abd pelvis        There are no Patient Instructions on file for this visit  Return for Next scheduled follow up w PCP  Subjective:      Patient ID: Zachery Miller is a [de-identified] y o  male  Chief Complaint   Patient presents with    Abdominal Pain     times 3 - 4 days    sas/cma       Pt unknown to me here for a same day appt for back pain  Pt states the back pain rad around his abd and hits him in the stomach and seems to take his breath away  Pt denies blood in his urine  Started three days ago  NO blood in urine   Unsure of stool  Right now he does not have any back pain - states this comes and goes  The following portions of the patient's history were reviewed and updated as appropriate: allergies, current medications, past family history, past medical history, past social history, past surgical history and problem list     Review of Systems   Constitutional: Positive for fatigue  Negative for activity change, appetite change, chills, diaphoresis, fever and unexpected weight change     HENT: Negative for congestion, dental problem, ear pain, mouth sores, sinus pressure, sinus pain, sore throat and trouble swallowing  Eyes: Negative for photophobia, discharge and itching  Respiratory: Negative for apnea, chest tightness and shortness of breath  Cardiovascular: Negative for chest pain, palpitations and leg swelling  Gastrointestinal: Positive for abdominal pain  Negative for abdominal distention, blood in stool, nausea and vomiting  Flank pain   Endocrine: Negative for cold intolerance, heat intolerance, polydipsia, polyphagia and polyuria  Genitourinary: Negative for difficulty urinating  Musculoskeletal: Positive for back pain  Negative for arthralgias  Skin: Negative for color change and wound  Neurological: Negative for dizziness, syncope, speech difficulty and headaches  Hematological: Negative for adenopathy  Psychiatric/Behavioral: Negative for agitation and behavioral problems  Current Outpatient Medications   Medication Sig Dispense Refill    albuterol (Ventolin HFA) 90 mcg/act inhaler Inhale 2 puffs every 6 (six) hours as needed for wheezing 3 Inhaler 3    allopurinol (ZYLOPRIM) 100 mg tablet Take 2 tablets (200 mg total) by mouth daily 180 tablet 3    ascorbic acid (VITAMIN C) 500 mg tablet Take 500 mg by mouth daily   B Complex Vitamins (B COMPLEX 1 PO) Take 1 tablet by mouth daily   Biotin (BIOTIN 5000) 5 MG CAPS Take 1,000 mcg by mouth daily   calcium carbonate-vitamin D (OSCAL-D) 500 mg-200 units per tablet Take 2 tablets by mouth daily at bedtime   clopidogrel (PLAVIX) 75 mg tablet Take 1 tablet (75 mg total) by mouth daily 90 tablet 1    colchicine (COLCRYS) 0 6 mg tablet Take 1 tablet (0 6 mg total) by mouth 2 (two) times a day Take twice daily x 7 days as needed for gout 30 tablet 5    Cranberry 1000 MG CAPS Take 1 tablet by mouth 2 (two) times a day        cyanocobalamin 1000 MCG tablet Take 100 mcg by mouth daily      dextromethorphan-guaiFENesin (ROBITUSSIN DM)  mg/5 mL syrup Take 10 mL by mouth every 4 (four) hours as needed for cough 118 mL 0    felodipine (PLENDIL) 5 mg 24 hr tablet Take 1 tablet (5 mg total) by mouth daily 90 tablet 1    finasteride (PROSCAR) 5 mg tablet Take 1 tablet (5 mg total) by mouth daily 90 tablet 3    Flaxseed, Linseed, (EQL FLAX SEED OIL) 1000 MG CAPS Take by mouth daily   fluocinonide (LIDEX) 0 05 % cream Apply topically 2 (two) times a day Prn leg rash, short term (Patient taking differently: Apply topically as needed Prn leg rash, short term) 120 g 2    fluticasone-umeclidinium-vilanterol (Trelegy Ellipta) 100-62 5-25 MCG/INH inhaler Inhale 1 puff daily Rinse mouth after use   furosemide (LASIX) 20 mg tablet TAKE 2 TABLETS (40 MG) IN AM AND 1 TABLET (20 MG) IN  tablet 3    glucosamine-chondroitin 500-400 MG tablet Take 1 tablet by mouth 2 (two) times a day   ipratropium-albuterol (DUO-NEB) 0 5-2 5 mg/3 mL nebulizer solution USE 1 VIAL IN NEBULIZER 3 TIMES DAILY 90 vial 11    lactase (LACTAID) 3,000 units tablet Take 3,000 Units by mouth as needed   Omega-3 Fatty Acids (FISH OIL) 1,000 mg Take 1,000 mg by mouth 2 (two) times a day   PARoxetine (PAXIL) 20 mg tablet Take 1 tablet (20 mg total) by mouth daily 90 tablet 1    rivaroxaban (XARELTO) 15 mg tablet Take 1 tablet (15 mg total) by mouth daily with breakfast (Patient taking differently: Take 20 mg by mouth daily at bedtime )      simvastatin (ZOCOR) 20 mg tablet Take 1 tablet (20 mg total) by mouth daily at bedtime 90 tablet 1    linaCLOtide (Linzess) 72 MCG CAPS Take 1 capsule by mouth every morning , 30 minutes before first meal (Patient not taking: Reported on 3/2/2021) 30 capsule 5    sulfamethoxazole-trimethoprim (BACTRIM DS) 800-160 mg per tablet Take 1 tablet by mouth every 12 (twelve) hours for 7 days 14 tablet 0     No current facility-administered medications for this visit  Objective:    /64   Pulse 70   Temp 99 2 °F (37 3 °C)   Resp 20   Ht 6' 1" (1 854 m)   Wt 127 kg (281 lb)   SpO2 94%   BMI 37 07 kg/m²        Physical Exam  Vitals signs and nursing note reviewed  Constitutional:       General: He is not in acute distress  Appearance: He is well-developed  He is not diaphoretic  HENT:      Head: Normocephalic and atraumatic  Right Ear: External ear normal       Left Ear: External ear normal       Nose: Nose normal       Mouth/Throat:      Pharynx: No oropharyngeal exudate  Eyes:      General: No scleral icterus  Right eye: No discharge  Left eye: No discharge  Pupils: Pupils are equal, round, and reactive to light  Neck:      Thyroid: No thyromegaly  Cardiovascular:      Rate and Rhythm: Normal rate  Heart sounds: Normal heart sounds  No murmur  Pulmonary:      Effort: Pulmonary effort is normal  No respiratory distress  Breath sounds: Normal breath sounds  No wheezing  Abdominal:      General: Bowel sounds are normal  There is no distension  Palpations: Abdomen is soft  There is no mass  Tenderness: There is no abdominal tenderness  There is no guarding or rebound  Musculoskeletal: Normal range of motion  Skin:     General: Skin is warm and dry  Findings: No erythema or rash  Neurological:      Mental Status: He is alert        Coordination: Coordination normal       Deep Tendon Reflexes: Reflexes normal    Psychiatric:         Behavior: Behavior normal                 Keyonna Passer, DO

## 2021-03-24 ENCOUNTER — APPOINTMENT (OUTPATIENT)
Dept: PHYSICAL THERAPY | Facility: CLINIC | Age: 81
End: 2021-03-24
Payer: MEDICARE

## 2021-03-24 LAB
BACTERIA UR CULT: NORMAL
Lab: NO GROWTH

## 2021-03-25 ENCOUNTER — TELEPHONE (OUTPATIENT)
Dept: FAMILY MEDICINE CLINIC | Facility: CLINIC | Age: 81
End: 2021-03-25

## 2021-03-25 NOTE — TELEPHONE ENCOUNTER
Pt is feeling better    Pt and wife aware to call the Office if there is any other issues  NFA  [email or web address]

## 2021-03-25 NOTE — TELEPHONE ENCOUNTER
Can we call pt and see how he is doing  Culture came back negative  He was already placed on abx, I assume he would be feeling better    Can we see hos he is doing

## 2021-03-26 ENCOUNTER — IMMUNIZATIONS (OUTPATIENT)
Dept: FAMILY MEDICINE CLINIC | Facility: HOSPITAL | Age: 81
End: 2021-03-26

## 2021-03-26 DIAGNOSIS — Z23 ENCOUNTER FOR IMMUNIZATION: Primary | ICD-10-CM

## 2021-03-26 PROCEDURE — 91301 SARS-COV-2 / COVID-19 MRNA VACCINE (MODERNA) 100 MCG: CPT

## 2021-03-26 PROCEDURE — 0012A SARS-COV-2 / COVID-19 MRNA VACCINE (MODERNA) 100 MCG: CPT

## 2021-03-29 ENCOUNTER — OFFICE VISIT (OUTPATIENT)
Dept: PHYSICAL THERAPY | Facility: CLINIC | Age: 81
End: 2021-03-29
Payer: MEDICARE

## 2021-03-29 DIAGNOSIS — R26.81 GAIT INSTABILITY: Primary | ICD-10-CM

## 2021-03-29 NOTE — PROGRESS NOTES
Daily Note     Today's date: 3/29/2021  Patient name: Leanne Casper  : 1940  MRN: [id number]  Referring provider: Keri Gabriel DPM  Dx:   Encounter Diagnosis     ICD-10-CM    1  Gait instability  R26 81                   Subjective: Patient reports to therapy not feeling well today       Objective: See treatment diary below    -Discussed patient new onset of pain, previous UTI, need to be seen by PCP       Assessment: Patient reported to therapy with increased low back/right side pain  Patient reports having UTI last week and having similar pain that has returned  Due to patient not feeling well, therapy to be held for today and patient agreed  Patient stated that he will be calling his doctor for an appointment this afternoon to be rechecked for potential UTI  PT to be held until medical clearance from patient PCP  Plan: Continue per plan of care  Precautions:  Past Medical History:   Diagnosis Date    Arthritis     [facility] fl[facility] ([facility] )     Chronic kidney disease     stage 3    Coronary artery disease     2 stents    Fluid retention     Gout     Heart failure ([facility] )     pacemaker    Hypertension     Pacemaker     Pulmonary emphysema ([facility] )     Radiculopathy     last assessed 16     Shortness of breath     exertion    Sleep apnea     c pap           Outcome Measures 2/8/21 3/10/21           5x sit to stand 12 63 seconds no UE 14 12 sec           30 second sit to stand 12 no UE 10 no UE           TUG 9 66 sec 9 19 sec           Gait Speed   84 m/s 0 77 m/s           2 minute walk test 150 feet 220 ft in 1:35           [place] 39/56 44/56           mCTSIB FTEO- 30 sec  FTEC- 30 sec   FTEO- 30 sec FTEC-7 5 sec FTEO- 30 sec  FTEC- 30 sec  FTEO w/ foam- 30 sec  FTEC w/ foam- 7 61 sec           2 minute walk teest 150 feet                                                                Ther Ex Ther Activity                                       Gait Training                                       Modalities

## 2021-03-31 ENCOUNTER — OFFICE VISIT (OUTPATIENT)
Dept: PODIATRY | Facility: CLINIC | Age: 81
End: 2021-03-31
Payer: MEDICARE

## 2021-03-31 ENCOUNTER — OFFICE VISIT (OUTPATIENT)
Dept: PHYSICAL THERAPY | Facility: CLINIC | Age: 81
End: 2021-03-31
Payer: MEDICARE

## 2021-03-31 VITALS
WEIGHT: 274 LBS | HEART RATE: 76 BPM | HEIGHT: 73 IN | BODY MASS INDEX: 36.31 KG/M2 | DIASTOLIC BLOOD PRESSURE: 77 MMHG | RESPIRATION RATE: 17 BRPM | SYSTOLIC BLOOD PRESSURE: 133 MMHG

## 2021-03-31 DIAGNOSIS — B35.9 DERMATOPHYTOSIS: ICD-10-CM

## 2021-03-31 DIAGNOSIS — M54.16 RADICULOPATHY OF LUMBAR REGION: ICD-10-CM

## 2021-03-31 DIAGNOSIS — M77.42 METATARSALGIA OF BOTH FEET: Primary | ICD-10-CM

## 2021-03-31 DIAGNOSIS — B35.1 ONYCHOMYCOSIS: ICD-10-CM

## 2021-03-31 DIAGNOSIS — R26.81 GAIT INSTABILITY: Primary | ICD-10-CM

## 2021-03-31 DIAGNOSIS — I70.209 PERIPHERAL ARTERIOSCLEROSIS (HCC): ICD-10-CM

## 2021-03-31 DIAGNOSIS — M77.41 METATARSALGIA OF BOTH FEET: Primary | ICD-10-CM

## 2021-03-31 PROCEDURE — 97112 NEUROMUSCULAR REEDUCATION: CPT | Performed by: PHYSICAL THERAPIST

## 2021-03-31 PROCEDURE — 97110 THERAPEUTIC EXERCISES: CPT | Performed by: PHYSICAL THERAPIST

## 2021-03-31 PROCEDURE — 99212 OFFICE O/P EST SF 10 MIN: CPT | Performed by: PODIATRIST

## 2021-03-31 NOTE — PROGRESS NOTES
Daily Note   PN (3-)    Today's date: 3/31/2021  Patient name: David Spencer  : 1940  MRN: [id number]  Referring provider: Nataly Keller DPM  Dx:   Encounter Diagnosis     ICD-10-CM    1  Gait instability  R26 81                   Subjective: Patient reports to therapy stating he is feeling much better than previous session       Objective: See treatment diary below    NM  -STS w/ foam- 20 reps, 1 airex on chairs  -Sidestepping on foam- 2 beams, 10x, 1 UE      2 minute walk test  Trial one-beginning of session- 150 ft in 1:40  Trial two-end of session- 130 feet, 53 sec      -Hurdles - forward - 6 cycles of 10 feet, 6" hurdles 0 UE        Assessment: Patient tolerated treatment well  Patient challenged with endurance throughout session, often requiring to rest during exercises  Patient challenged with dynamic balance while sidestepping on foam, displaying anterior LOB but was able to correct with 1 UE assist  He will benefit from skilled PT services to improve his balance and endurance  Plan: Continue per plan of care  Precautions:  Past Medical History:   Diagnosis Date    Arthritis     [facility]     Chronic kidney disease     stage 3    Coronary artery disease     2 stents    Fluid retention     Gout     [facility] failure ([facility] )     pacemaker    Hypertension     Pacemaker     [facility]     Radiculopathy     last assessed 16     Shortness of breath     exertion    Sleep apnea     c pap           Outcome Measures 2/8/21 3/10/21           5x sit to stand 12 63 seconds no UE 14 12 sec           30 second sit to stand 12 no UE 10 no UE           TUG 9 66 sec 9 19 sec           Gait Speed   84 m/s 0 77 m/s           2 minute walk test 150 feet 220 ft in 1:35           [place] 39/56 44/56           mCTSIB FTEO- 30 sec  FTEC- 30 sec   FTEO- 30 sec FTEC-7 5 sec FTEO- 30 sec  FTEC- 30 sec  FTEO w/ foam- 30 sec  FTEC w/ foam- 7 61 sec           2 minute walk teest 150 feet                                                                Ther Ex                                                                                                                     Ther Activity                                       Gait Training                                       Modalities

## 2021-03-31 NOTE — PROGRESS NOTES
Assessment/Plan:  Pain upon ambulation   Limb pain   Rule out radiculopathy   Edema secondary to venous insufficiency   Rule out degenerative disc disease   Probable spinal stenosis   Dermatophytosis   Mycosis of nail      Plan   Patient advised on condition   All nails debrided   Calluses debrided without pain or complication   Patient will follow up with pain management and acupuncture specialists   In addition all abnormal skin debrided without pain or complication      In addition arterial Doppler testing will be ordered to rule out extent of peripheral artery disease    Patient is also being referred to the balance Center to help with gait instability due to radiculopathy         Subjective:  Patient complains of pain in his feet with ambulation   He has pain in his back and legs   He is doing well with acupuncture   He complains of pain with shoe wear   No history of trauma                 Past Medical History:   Diagnosis Date    Arthritis      Atrial flutter (HCC)      Chronic kidney disease       stage 3    Coronary artery disease       2 stents    Fluid retention      Gout      Heart failure (HCC)       pacemaker    Hypertension      Pacemaker      Pulmonary emphysema (HCC)      Radiculopathy       last assessed 1/28/16     Shortness of breath       exertion    Sleep apnea       c pap                   Past Surgical History:   Procedure Laterality Date    ANGIOPLASTY         x2 2 stents and then replaced    CARDIAC PACEMAKER PLACEMENT         pacemaker permanent placement dual chamber / last assessed 4/7/14 / implantation     CARDIAC SURGERY         pacemaker    CHOLECYSTECTOMY        CORONARY ANGIOPLASTY WITH STENT PLACEMENT        EPIDURAL BLOCK INJECTION N/A 5/26/2016     Procedure: BLOCK / INJECTION EPIDURAL STEROID LUMBAR  L4-5;  Surgeon: Jin Chairez MD;  Location: [facility] MAIN OR;  Service:     EYE SURGERY         cataract left    KNEE ARTHROSCOPY W/ MENISCAL REPAIR Left      LUMBAR EPIDURAL INJECTION N/A 3/17/2016     Procedure: BLOCK / INJECTION LUMBAR  L4-5  (C-ARM);  Surgeon: Omari Huffman MD;  Location: [facility] MAIN OR;  Service:          No Known Allergies        Current Outpatient Prescriptions:     albuterol (VENTOLIN HFA) 90 mcg/act inhaler, Inhale 2 puffs every 6 (six) hours as needed for wheezing, Disp: 1 Inhaler, Rfl: 6    allopurinol (ZYLOPRIM) 100 mg tablet, Take 1 tablet (100 mg total) by mouth daily, Disp: 90 tablet, Rfl: 3    ascorbic acid (VITAMIN C) 500 mg tablet, Take 500 mg by mouth daily  , Disp: , Rfl:     B Complex Vitamins (B COMPLEX 1 PO), Take 1 tablet by mouth daily  , Disp: , Rfl:     Biotin (BIOTIN 5000) 5 MG CAPS, Take 1,000 mcg by mouth daily  , Disp: , Rfl:     calcium carbonate-vitamin D (OSCAL-D) 500 mg-200 units per tablet, Take 2 tablets by mouth daily at bedtime  , Disp: , Rfl:     ciclopirox (LOPROX) 0 77 % cream, Apply topically 2 (two) times a day for 20 days, Disp: 45 g, Rfl: 2    clopidogrel (PLAVIX) 75 mg tablet, TAKE 1 TABLET DAILY, Disp: 90 tablet, Rfl: 3    collagenase (SANTYL) ointment, Apply 1 application topically daily  , Disp: , Rfl:     Cranberry 1000 MG CAPS, Take 1 tablet by mouth 2 (two) times a day , Disp: , Rfl:     cyanocobalamin 1000 MCG tablet, Take 100 mcg by mouth daily, Disp: , Rfl:     felodipine (PLENDIL) 5 mg 24 hr tablet, TAKE 1 TABLET DAILY, Disp: 90 tablet, Rfl: 3    finasteride (PROSCAR) 5 mg tablet, Take 1 tablet (5 mg total) by mouth daily, Disp: 90 tablet, Rfl: 3    Flaxseed, Linseed, (EQL FLAX SEED OIL) 1000 MG CAPS, Take by mouth daily  , Disp: , Rfl:     fluocinonide (LIDEX) 0 05 % cream, Apply topically 2 (two) times a day for 30 days, Disp: 30 g, Rfl: 2    fluticasone-salmeterol (ADVAIR) 250-50 mcg/dose inhaler, Inhale 1 puff 2 (two) times a day Rinse mouth after use , Disp: 1 Inhaler, Rfl: 6    furosemide (LASIX) 40 mg tablet, TAKE 1 TABLET BY MOUTH DAILY AS NEEDED FOR LEG EDEMA OR DYSNEA, Disp: 30 tablet, Rfl: 0    gabapentin (NEURONTIN) 300 mg capsule, TAKE 1 CAPSULE (300 MG TOTAL) BY MOUTH 2 (TWO) TIMES A DAY FOR 30 DAYS, Disp: 60 capsule, Rfl: 2    gabapentin (NEURONTIN) 300 mg capsule, Take 1 capsule (300 mg total) by mouth 3 (three) times a day for 30 days, Disp: 90 capsule, Rfl: 0    gabapentin (NEURONTIN) 600 MG tablet, Take 1 tablet (600 mg total) by mouth 3 (three) times a day for 30 days, Disp: 60 tablet, Rfl: 0    glucosamine-chondroitin 500-400 MG tablet, Take 1 tablet by mouth 2 (two) times a day , Disp: , Rfl:     hydrOXYzine HCL (ATARAX) 25 mg tablet, Take 1 tablet (25 mg total) by mouth 3 (three) times a day for 3 days, Disp: 9 tablet, Rfl: 0    lactase (LACTAID) 3,000 units tablet, Take 3,000 Units by mouth as needed  , Disp: , Rfl:     methylPREDNISolone 4 MG tablet therapy pack, Use as directed on package, Disp: 21 tablet, Rfl: 0    Omega-3 Fatty Acids (FISH OIL) 1,000 mg, Take 1,000 mg by mouth 2 (two) times a day , Disp: , Rfl:     oxyCODONE-acetaminophen (PERCOCET) 5-325 mg per tablet, 1 tablet to be taken 3 times daily as needed for leg pain, Disp: 30 tablet, Rfl: 0    PARoxetine (PAXIL) 20 mg tablet, TAKE 1/2 TABLET BY MOUTH DAILY, Disp: 30 tablet, Rfl: 0    psyllium (METAMUCIL) 58 6 % powder, Take 1 packet by mouth daily Indications: 1 tsp , Disp: , Rfl:     rivaroxaban (XARELTO) tablet, Take 20 mg by mouth , Disp: , Rfl:     simvastatin (ZOCOR) 20 mg tablet, Take 1 tablet (20 mg total) by mouth daily at bedtime, Disp: 90 tablet, Rfl: 3    traMADol (ULTRAM) 50 mg tablet, 1 tablet to be taken twice daily as needed for foot and leg pain, Disp: 30 tablet, Rfl: 0           Patient Active Problem List   Diagnosis    Chronic pain disorder    Bilateral lumbar radiculopathy    Lumbar canal stenosis    Acquired ankle/foot deformity    Pain in joints of both feet    Dermatophytosis    Atherosclerosis of arteries of extremities (HCC)    Pain in both feet    Onychomycosis  Neck muscle spasm    Anxiety 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    Benign hypertension with CKD (chronic kidney disease) stage III (HCC)    Bilateral leg edema    CKD (chronic kidney disease), stage III (HCC)    Microscopic hematuria    Proteinuria    Urinary frequency    Atrial fibrillation (HCC)    Benign prostatic hyperplasia    Congestive heart failure (HCC)    Arteriosclerosis of coronary artery    Allergic rhinitis    Aneurysm of abdominal aorta (HCC)    Angina pectoris (HCC)    Pain in joint involving ankle and foot    Atrial flutter (HCC)    Carpal tunnel syndrome    Chronic gout without tophus    Chronic low back pain    Chronic obstructive pulmonary disease (HCC)    Colon, diverticulosis    Deep venous insufficiency    Degenerative disc disease, lumbar    Difficulty in walking    Fecal soiling    Fibromyalgia    Fistula-in-ano    Hyperlipidemia    Insomnia, persistent    Memory loss    Moderate or severe vision impairment, one eye    Morbid obesity (HCC)    Nicotine dependence    Osteoarthritis of knee    Pacemaker    Sleep apnea    Urinary incontinence    Venous insufficiency (chronic) (peripheral)    BMI 45 0-49 9, adult (HCC)    Carbuncle of neck    Carbuncle of occipital region of scalp    Elevated glucose    Peripheral arteriosclerosis (HCC)    Radiculopathy of lumbar region    Metatarsalgia of both feet          Physical Exam  Left Foot: Appearance: Normal except as noted: excessive pronation-- and-- pes planus  Second toe deformities include hammer toe  Third toe deformities include hammer toe  Forth toe deformities include hammer toe  Fifth toe deformities include hammer toe  Tenderness: None except the great toe,-- distal first metatarsal,-- distal fifth metatarsal-- and-- insertion of the plantar fascia  Positive Brian sign third left intermetatarsal space  ROM: Full   Motor: Normal   Right Foot: Appearance: Normal except as noted: excessive pronation-- and-- pes planus  Second toe deformities include hammer toe  Third toe deformities include hammer toe  Forth toe deformities include hammer toe  Fifth toe deformities include hammer toe  Evaluation of the great toe nail demonstrates an ingrown nail  Tenderness: None except the insertion of the plantar fascia  Left Ankle: Appearance: Normal except erythema,-- swelling-- and-- swelling laterally  Tenderness: None except the tibiotalar joint  Right Ankle: ROM: Full  Neurological Exam: performed  Deep tendon reflexes: + tinel of right tibial nerve  Vascular Exam: performed Dorsalis pedis pulses were present bilaterally  Posterior tibial pulses were present bilaterally  Elevation Pallor: absent bilaterally  Capillary refill time was less than 1 second bilaterally  Edema: moderate bilaterally-- and-- 4/7 pitting  neg  buddy  Toenails: All of the toenails were elongated,-- hypertrophied,-- discolored,-- ingrown,-- shown to have subungual debris,-- tender-- and-- Mycotic with onychauxis  Hyperkeratosis: present on both first toes,-- present on both first sub metatarsals-- and-- present on both fifth sub metatarsals  Shoe Gear Evaluation: performed ()   Recommendation(s): extra depth diabetic shoes

## 2021-04-01 ENCOUNTER — OFFICE VISIT (OUTPATIENT)
Dept: FAMILY MEDICINE CLINIC | Facility: CLINIC | Age: 81
End: 2021-04-01
Payer: MEDICARE

## 2021-04-01 VITALS
HEART RATE: 78 BPM | TEMPERATURE: 97.2 F | RESPIRATION RATE: 18 BRPM | SYSTOLIC BLOOD PRESSURE: 114 MMHG | BODY MASS INDEX: 36.31 KG/M2 | DIASTOLIC BLOOD PRESSURE: 66 MMHG | WEIGHT: 274 LBS | HEIGHT: 73 IN

## 2021-04-01 DIAGNOSIS — I48.20 CHRONIC A-FIB (HCC): ICD-10-CM

## 2021-04-01 DIAGNOSIS — N20.0 KIDNEY STONE ON LEFT SIDE: ICD-10-CM

## 2021-04-01 DIAGNOSIS — N02.9 RECURRENT AND PERSISTENT HEMATURIA: Primary | ICD-10-CM

## 2021-04-01 DIAGNOSIS — E66.9 OBESITY (BMI 30-39.9): ICD-10-CM

## 2021-04-01 DIAGNOSIS — R10.9 FLANK PAIN: ICD-10-CM

## 2021-04-01 DIAGNOSIS — F41.1 GAD (GENERALIZED ANXIETY DISORDER): ICD-10-CM

## 2021-04-01 DIAGNOSIS — I25.10 CORONARY ARTERY DISEASE INVOLVING NATIVE CORONARY ARTERY OF NATIVE HEART WITHOUT ANGINA PECTORIS: Chronic | ICD-10-CM

## 2021-04-01 PROBLEM — M25.531 WRIST PAIN, ACUTE, RIGHT: Status: RESOLVED | Noted: 2019-07-22 | Resolved: 2021-04-01

## 2021-04-01 PROBLEM — R05.9 COUGH: Status: RESOLVED | Noted: 2020-09-21 | Resolved: 2021-04-01

## 2021-04-01 PROBLEM — M48.062 SPINAL STENOSIS, LUMBAR REGION, WITH NEUROGENIC CLAUDICATION: Status: RESOLVED | Noted: 2019-02-04 | Resolved: 2021-04-01

## 2021-04-01 PROBLEM — R31.9 HEMATURIA: Status: RESOLVED | Noted: 2020-11-13 | Resolved: 2021-04-01

## 2021-04-01 PROBLEM — R73.09 ELEVATED GLUCOSE: Status: RESOLVED | Noted: 2018-12-10 | Resolved: 2021-04-01

## 2021-04-01 LAB
SL AMB  POCT GLUCOSE, UA: NORMAL
SL AMB LEUKOCYTE ESTERASE,UA: NORMAL
SL AMB POCT BILIRUBIN,UA: NORMAL
SL AMB POCT BLOOD,UA: NORMAL
SL AMB POCT CLARITY,UA: CLEAR
SL AMB POCT COLOR,UA: YELLOW
SL AMB POCT KETONES,UA: NORMAL
SL AMB POCT NITRITE,UA: NEGATIVE
SL AMB POCT PH,UA: 7
SL AMB POCT SPECIFIC GRAVITY,UA: 1.02
SL AMB POCT URINE PROTEIN: NORMAL
SL AMB POCT UROBILINOGEN: 0.2

## 2021-04-01 PROCEDURE — 81003 URINALYSIS AUTO W/O SCOPE: CPT | Performed by: FAMILY MEDICINE

## 2021-04-01 PROCEDURE — 99214 OFFICE O/P EST MOD 30 MIN: CPT | Performed by: FAMILY MEDICINE

## 2021-04-01 PROCEDURE — 87086 URINE CULTURE/COLONY COUNT: CPT | Performed by: FAMILY MEDICINE

## 2021-04-01 NOTE — PROGRESS NOTES
Assessment/Plan:    No problem-specific Assessment & Plan notes found for this encounter  Urology re-eval suggested due to recurrent hematuria w/o uti  Pt understands plan, can call for [facility] uro referral if desired but he wants to stay with Dr Bartolo Fontenot for now    Right flank pain r/o msk cause, better with position changes, has known lumbar DDD, PT if willing    CAD/htn stable     Diagnoses and all orders for this visit:    Recurrent and persistent hematuria    Flank pain  -     POCT urine dip auto non-scope  -     Urine culture    Coronary artery disease involving native coronary artery of native heart without angina pectoris    JOO (generalized anxiety disorder)    Chronic a-fib ([address] )    Kidney stone on left side    Obesity (BMI 30-39  9)              Return if symptoms worsen or fail to improve  Subjective:      Patient ID: Sho Merrill is a [de-identified] y o  male  Chief Complaint   Patient presents with    Possible UTI     continued flank pain ss,lpn       HPI  Had covid shot #2 1 wk ago  Intermittent right flank pain  Seen last week  Sharp  Pt did not see any blood  No dysuria  No frequency  Hx of tob, quit <15y ago  No frequent radiation to abdomen  No chemical exposure  Urine culture neg  Finished abx  No unexplained wt loss  Has seen Dr Bartolo Fontenot yearly, routine    Ct abd neg 6m ago with normal bladder, no kidney lesions but left sided kidney stone    Right flank pain does get better with position changes    The following portions of the patient's history were reviewed and updated as appropriate: allergies, current medications, past family history, past medical history, past social history, past surgical history and problem list     Review of Systems   Constitutional: Negative for fever  Respiratory: Negative for shortness of breath            Current Outpatient Medications   Medication Sig Dispense Refill    albuterol (Ventolin HFA) 90 mcg/act inhaler Inhale 2 puffs every 6 (six) hours as needed for wheezing 3 Inhaler 3    allopurinol (ZYLOPRIM) 100 mg tablet Take 2 tablets (200 mg total) by mouth daily 180 tablet 3    ascorbic acid (VITAMIN C) 500 mg tablet Take 500 mg by mouth daily   B Complex Vitamins (B COMPLEX 1 PO) Take 1 tablet by mouth daily   Biotin (BIOTIN 5000) 5 MG CAPS Take 1,000 mcg by mouth daily   calcium carbonate-vitamin D (OSCAL-D) 500 mg-200 units per tablet Take 2 tablets by mouth daily at bedtime   clopidogrel (PLAVIX) 75 mg tablet Take 1 tablet (75 mg total) by mouth daily 90 tablet 1    colchicine (COLCRYS) 0 6 mg tablet Take 1 tablet (0 6 mg total) by mouth 2 (two) times a day Take twice daily x 7 days as needed for gout 30 tablet 5    Cranberry 1000 MG CAPS Take 1 tablet by mouth 2 (two) times a day   cyanocobalamin 1000 MCG tablet Take 100 mcg by mouth daily      dextromethorphan-guaiFENesin (ROBITUSSIN DM)  mg/5 mL syrup Take 10 mL by mouth every 4 (four) hours as needed for cough 118 mL 0    felodipine (PLENDIL) 5 mg 24 hr tablet Take 1 tablet (5 mg total) by mouth daily 90 tablet 1    finasteride (PROSCAR) 5 mg tablet Take 1 tablet (5 mg total) by mouth daily 90 tablet 3    Flaxseed, Linseed, (EQL FLAX SEED OIL) 1000 MG CAPS Take by mouth daily   fluocinonide (LIDEX) 0 05 % cream Apply topically 2 (two) times a day Prn leg rash, short term (Patient taking differently: Apply topically as needed Prn leg rash, short term) 120 g 2    fluticasone-umeclidinium-vilanterol (Trelegy Ellipta) 100-62 5-25 MCG/INH inhaler Inhale 1 puff daily Rinse mouth after use   furosemide (LASIX) 20 mg tablet TAKE 2 TABLETS (40 MG) IN AM AND 1 TABLET (20 MG) IN  tablet 3    glucosamine-chondroitin 500-400 MG tablet Take 1 tablet by mouth 2 (two) times a day        ipratropium-albuterol (DUO-NEB) 0 5-2 5 mg/3 mL nebulizer solution USE 1 VIAL IN NEBULIZER 3 TIMES DAILY 90 vial 11    lactase (LACTAID) 3,000 units tablet Take 3,000 Units by mouth as needed   Omega-3 Fatty Acids (FISH OIL) 1,000 mg Take 1,000 mg by mouth 2 (two) times a day   PARoxetine (PAXIL) 20 mg tablet Take 1 tablet (20 mg total) by mouth daily 90 tablet 1    rivaroxaban (XARELTO) 15 mg tablet Take 1 tablet (15 mg total) by mouth daily with breakfast (Patient taking differently: Take 20 mg by mouth daily at bedtime )      simvastatin (ZOCOR) 20 mg tablet Take 1 tablet (20 mg total) by mouth daily at bedtime 90 tablet 1     No current facility-administered medications for this visit  Objective:    /66   Pulse 78   Temp (!) 97 2 °F (36 2 °C)   Resp 18   Ht 6' 1" (1 854 m)   Wt 124 kg (274 lb)   BMI 36 15 kg/m²        Physical Exam  Vitals signs and nursing note reviewed  Constitutional:       General: He is not in acute distress  Appearance: He is well-developed  He is obese  He is not ill-appearing  HENT:      Head: Normocephalic  Eyes:      General: No scleral icterus  Conjunctiva/sclera: Conjunctivae normal    Neck:      Musculoskeletal: Neck supple  Cardiovascular:      Rate and Rhythm: Normal rate  Rhythm irregularly irregular  Heart sounds: No murmur  Pulmonary:      Effort: Pulmonary effort is normal  No respiratory distress  Breath sounds: No wheezing  Abdominal:      General: There is no distension  Palpations: Abdomen is soft  Tenderness: There is no abdominal tenderness  Musculoskeletal:         General: No deformity  Skin:     General: Skin is warm and dry  Coloration: Skin is not pale  Findings: No rash  Neurological:      Mental Status: He is alert  Psychiatric:         Behavior: Behavior normal          Thought Content: Thought content normal          BMI Counseling: Body mass index is 36 15 kg/m²  The BMI is above normal  Nutrition recommendations include decreasing portion sizes and moderation in carbohydrate intake  Exercise recommendations include exercising 3-5 times per week   No pharmacotherapy was ordered                Dayanna Reynolds DO

## 2021-04-01 NOTE — PATIENT INSTRUCTIONS
Because you continue to have blood detected in the urine, and the abdominal CAT scan in Sept 2020 did not reveal much except a small left kidney stone, it is recommended that you see a urologist for the possibility of a more detailed examination, possibly in your bladder  The urine cultures did not show any infection

## 2021-04-02 LAB — BACTERIA UR CULT: NORMAL

## 2021-04-05 ENCOUNTER — OFFICE VISIT (OUTPATIENT)
Dept: PHYSICAL THERAPY | Facility: CLINIC | Age: 81
End: 2021-04-05
Payer: MEDICARE

## 2021-04-05 DIAGNOSIS — R26.81 GAIT INSTABILITY: Primary | ICD-10-CM

## 2021-04-05 PROCEDURE — 97116 GAIT TRAINING THERAPY: CPT | Performed by: PHYSICAL THERAPIST

## 2021-04-05 PROCEDURE — 97112 NEUROMUSCULAR REEDUCATION: CPT | Performed by: PHYSICAL THERAPIST

## 2021-04-05 NOTE — PROGRESS NOTES
Daily Note   PN (3-)    Today's date: 2021  Patient name: Nia Andersen  : 1940  MRN: [id number]  Referring provider: Seda Edouard DPM  Dx:   Encounter Diagnosis     ICD-10-CM    1  Gait instability  R26 81                   Subjective: Patient reports to therapy stating he is feeling alright this morning      Objective: See treatment diary below    NM  -STS w/ foam- 20 reps, 1 airex on chairs  -Sidestepping on foam- 3 beams, 8x 2 sets, 1 UE  -Hurdles - forward - 6 cycles of 10 feet, 6" hurdles 0 UE      -FWD/BWD walking- 10 ft x 10 cycles  -Ambulation to lobby- 150 ft, 1 minute        Assessment: Patient tolerated treatment well  Patient remains challenged with endurance and exercises, utilizing frequent rest breaks  During BWD ambulation, patient displays reduced step width, reduced step length, and reduced speed  Patient showed slight improvement in step length with verbal cueing  He will benefit from skilled PT services to improve his balance and endurance  Plan: Continue per plan of care  Precautions:  Past Medical History:   Diagnosis Date    Arthritis     [facility]     Chronic kidney disease     stage 3    Coronary artery disease     2 stents    Fluid retention     Gout     [facility]     pacemaker    Hypertension     Pacemaker     [facility]ma [facility]     Radiculopathy     last assessed 16     Shortness of breath     exertion    Sleep apnea     c pap           Outcome Measures 2/8/21 3/10/21           5x sit to stand 12 63 seconds no UE 14 12 sec           30 second sit to stand 12 no UE 10 no UE           TUG 9 66 sec 9 19 sec           Gait Speed   84 m/s 0 77 m/s           2 minute walk test 150 feet 220 ft in 1:35           [place] 39/56 44/56           mCTSIB FTEO- 30 sec  FTEC- 30 sec   FTEO- 30 sec FTEC-7 5 sec FTEO- 30 sec  FTEC- 30 sec  FTEO w/ foam- 30 sec  FTEC w/ foam- 7 61 sec           2 minute walk teest 150 feet Ther Ex                                                                                                                     Ther Activity                                       Gait Training                                       Modalities

## 2021-04-07 ENCOUNTER — OFFICE VISIT (OUTPATIENT)
Dept: PHYSICAL THERAPY | Facility: CLINIC | Age: 81
End: 2021-04-07
Payer: MEDICARE

## 2021-04-07 ENCOUNTER — TRANSCRIBE ORDERS (OUTPATIENT)
Dept: ADMINISTRATIVE | Facility: HOSPITAL | Age: 81
End: 2021-04-07

## 2021-04-07 DIAGNOSIS — N20.0 RENAL CALCULUS: ICD-10-CM

## 2021-04-07 DIAGNOSIS — R26.81 GAIT INSTABILITY: Primary | ICD-10-CM

## 2021-04-07 DIAGNOSIS — N20.0 CALCULUS OF KIDNEY: Primary | ICD-10-CM

## 2021-04-07 PROCEDURE — 97112 NEUROMUSCULAR REEDUCATION: CPT | Performed by: PHYSICAL THERAPIST

## 2021-04-07 NOTE — PROGRESS NOTES
Daily Note   PN (3-)    Today's date: 2021  Patient name: Maral Witt  : 1940  MRN: [id number]  Referring provider: Jorge Olivas DPM  Dx:   Encounter Diagnosis     ICD-10-CM    1  Gait instability  R26 81                   Subjective: Patient reports to therapy stating he is feeling alright this morning      Objective: See treatment diary below    NM  -STS w/ foam- 20 reps, 1 airex on chairs  -Sidestepping on foam- 3 beams, 8x 2 sets, 1 UE  -Hurdles - lateral - 8 cycles of 10 feet, 6" hurdles 0 UE      -Ambulation to lobby- 130 ft, 55 seconds  2 minute walkin ft in 2:08      Assessment: Patient tolerated treatment well  He remains challenged with endurance and exercises, requiring frequent rest breaks  When sidestepping on foam, he is challenged with anterior/posterior balance reactions, and uses occasional UE support to maintain balance  He did show improvement with his 2 minute walking drill, and was able to ambulate 2:09 without break  He will continue to benefit from skilled PT services to improve his balance and endurance  Plan: Continue per plan of care  Precautions:  Past Medical History:   Diagnosis Date    Arthritis     [email or web address]     Chronic kidney disease     stage 3    Coronary artery disease     2 stents    Fluid retention     Gout     [facility] ([email or web address]     pacemaker    Hypertension     Pacemaker     Pulmonary emphysema ([facility] )     Radiculopathy     last assessed 16     Shortness of breath     exertion    Sleep apnea     c pap           Outcome Measures 2/8/21 3/10/21           5x sit to stand 12 63 seconds no UE 14 12 sec           30 second sit to stand 12 no UE 10 no UE           TUG 9 66 sec 9 19 sec           Gait Speed   84 m/s 0 77 m/s           2 minute walk test 150 feet 220 ft in 1:35           [place] 39/56 44/56           mCTSIB FTEO- 30 sec  FTEC- 30 sec   FTEO- 30 sec FTEC-7 5 sec FTEO- 30 sec  FTEC- 30 sec  FTEO w/ foam- 30 sec  FTEC w/ foam- 7 61 sec           2 minute walk teest 150 feet                                                                Ther Ex                                                                                                                     Ther Activity                                       Gait Training                                       Modalities

## 2021-04-08 ENCOUNTER — HOSPITAL ENCOUNTER (OUTPATIENT)
Dept: RADIOLOGY | Facility: HOSPITAL | Age: 81
Discharge: HOME | End: 2021-04-08
Attending: SPECIALIST
Payer: MEDICARE

## 2021-04-08 DIAGNOSIS — N20.0 CALCULUS OF KIDNEY: ICD-10-CM

## 2021-04-08 DIAGNOSIS — N20.0 RENAL CALCULUS: ICD-10-CM

## 2021-04-08 PROCEDURE — 74018 RADEX ABDOMEN 1 VIEW: CPT

## 2021-04-08 PROCEDURE — 74176 CT ABD & PELVIS W/O CONTRAST: CPT

## 2021-04-12 ENCOUNTER — OFFICE VISIT (OUTPATIENT)
Dept: PHYSICAL THERAPY | Facility: CLINIC | Age: 81
End: 2021-04-12
Payer: MEDICARE

## 2021-04-12 DIAGNOSIS — R26.81 GAIT INSTABILITY: Primary | ICD-10-CM

## 2021-04-12 PROCEDURE — 97110 THERAPEUTIC EXERCISES: CPT | Performed by: PHYSICAL THERAPIST

## 2021-04-12 PROCEDURE — 97112 NEUROMUSCULAR REEDUCATION: CPT | Performed by: PHYSICAL THERAPIST

## 2021-04-12 NOTE — PROGRESS NOTES
Daily Note   PN (3-)    Today's date: 2021  Patient name: Sho Merrill  : 1940  MRN: [id number]  Referring provider: Mildred Sloan DPM  Dx:   Encounter Diagnosis     ICD-10-CM    1  Gait instability  R26 81                   Subjective: Patient reports to therapy stating he hurt his back while working on his tractor over the weekend      Objective: See treatment diary below    TE:  P-ball rollouts: blue ball, 10x FWD/LAT  Standing hip 3-way      NM  -STS w/ foam- 20 reps, 1 airex on chairs  -FTEO, FTEC w/ foam- 30 sec, 5x  -Tandem stance- 30 sec, 2x        -Ambulation to lobby- 100 ft, 52 seconds        Assessment: Patient tolerated treatment fairly today  Patient exercises challenged due to increased back pain from working on his tractor over the weekend  P-ball rollouts were used reduce back pain and improve mobility  Reduced walking today during treatment due to increased pain  He will continue to benefit from skilled PT services to improve his balance and endurance  Plan: Continue per plan of care  Precautions:  Past Medical History:   Diagnosis Date    Arthritis     [facility] fl[facility]     Chronic kidney disease     stage 3    Coronary artery disease     2 stents    Fluid retention     Gout     [facility]     pacemaker    Hypertension     Pacemaker     Pul[facility] emphysema ([facility] )     Radiculopathy     last assessed 16     Shortness of breath     exertion    Sleep apnea     c pap           Outcome Measures 2/8/21 3/10/21           5x sit to stand 12 63 seconds no UE 14 12 sec           30 second sit to stand 12 no UE 10 no UE           TUG 9 66 sec 9 19 sec           Gait Speed   84 m/s 0 77 m/s           2 minute walk test 150 feet 220 ft in 1:35           [place] 39/56 44/56           mCTSIB FTEO- 30 sec  FTEC- 30 sec   FTEO- 30 sec FTEC-7 5 sec FTEO- 30 sec  FTEC- 30 sec  FTEO w/ foam- 30 sec  FTEC w/ foam- 7 61 sec           2 minute walk teest 150 feet                                                                Ther Ex                                                                                                                     Ther Activity                                       Gait Training                                       Modalities

## 2021-04-14 ENCOUNTER — OFFICE VISIT (OUTPATIENT)
Dept: PHYSICAL THERAPY | Facility: CLINIC | Age: 81
End: 2021-04-14
Payer: MEDICARE

## 2021-04-14 DIAGNOSIS — R26.81 GAIT INSTABILITY: Primary | ICD-10-CM

## 2021-04-14 PROCEDURE — 97112 NEUROMUSCULAR REEDUCATION: CPT

## 2021-04-14 NOTE — PROGRESS NOTES
Daily Note   PN (3-)    Today's date: 2021  Patient name: Jayce Poster  : 1940  MRN: [id number]  Referring provider: Shahnaz Rutherford DPM  Dx:   Encounter Diagnosis     ICD-10-CM    1  Gait instability  R26 81        Start Time: 805  Stop Time: 4470  Total time in clinic (min): 40 minutes    Subjective: Patient reports he still has low back pain from this weekend but states its improving  Objective: See treatment diary below  Pt requested to hold hurdles today  TE:  P-ball rollouts: blue ball, 20x FWD/LAT  Standing hip abd 21x, ext 45x      NM  -STS standing on foam- 20 reps continuous, 1 airex on chair  -Tandem stance- 30 sec, 2x  -Alt step taps 6" 21x         -Ambulation to lobby then to car no rest break- 100 ft, 58 seconds        Assessment: Patient tolerated treatment fairly today  Pt tolerated session well today, required rest breaks, however demo good activity tolerance, able to ambulate to car from [employer]  [facility] He will continue to benefit from skilled PT services to improve his balance and endurance  Plan: Continue per plan of care  Precautions:  Past Medical History:   Diagnosis Date    Arthritis     [facility]     Chronic kidney disease     stage 3    Coronary artery disease     2 stents    Fluid retention     Gout     [facility]     pacemaker    Hypertension     Pacemaker     [facility]sema ([facility] )     Radiculopathy     last assessed 16     Shortness of breath     exertion    Sleep apnea     c pap           Outcome Measures 2/8/21 3/10/21           5x sit to stand 12 63 seconds no UE 14 12 sec           30 second sit to stand 12 no UE 10 no UE           TUG 9 66 sec 9 19 sec           Gait Speed   84 m/s 0 77 m/s           2 minute walk test 150 feet 220 ft in 1:35           [place] 39/56 44/56           mCTSIB FTEO- 30 sec  FTEC- 30 sec   FTEO- 30 sec FTEC-7 5 sec FTEO- 30 sec  FTEC- 30 sec  FTEO w/ foam- 30 sec  FTEC w/ foam- 7 61 sec           2 minute walk teest 150 feet                                                                Ther Ex                                                                                                                     Ther Activity                                       Gait Training                                       Modalities

## 2021-04-19 ENCOUNTER — OFFICE VISIT (OUTPATIENT)
Dept: PHYSICAL THERAPY | Facility: CLINIC | Age: 81
End: 2021-04-19
Payer: MEDICARE

## 2021-04-19 DIAGNOSIS — R26.81 GAIT INSTABILITY: Primary | ICD-10-CM

## 2021-04-19 PROCEDURE — 97530 THERAPEUTIC ACTIVITIES: CPT | Performed by: PHYSICAL THERAPIST

## 2021-04-19 NOTE — PROGRESS NOTES
Progress Note  (POC: 5-3-2021)    Today's date: 2021  Patient name: April Shirley  : 1940  MRN: [id number]  Referring provider: Gabriel Drummond DPM  Dx:   Encounter Diagnosis     ICD-10-CM    1  Gait instability  R26 81                   Assessment  Assessment details: Patient is an [de-identified]year old male presenting to PT for a progress note from therapy for deconditioning and fatigue due falls and LOB  He reports that he feels like his therapy has been helping and he has not had any falls or near falls recently  Patient demonstrates reduced functional strength from 5xSTS time of 14 5 seconds, which is consistent with his last progress note  His TUG time increased to 12 25 seconds  Patient shows reduced functional endurance by only completing 2 min walk test of 220 ft, which is consistent with his last progress note  His gait speed remained consistent at 0 76 m/s  He showed the most improvement with his static balance, improving his FTEC w/ foam time from 7 61 seconds to 25 37 seconds  Plan to continue with therapy 2 more weeks to improve balance, increase functional mobility and reduce risk of falls  Impairments: abnormal coordination, abnormal gait, abnormal muscle firing, abnormal muscle tone, abnormal or restricted ROM, abnormal movement, activity intolerance, difficulty understanding, impaired balance, impaired physical strength, lacks appropriate home exercise program, pain with function, safety issue, weight-bearing intolerance, poor posture  and poor body mechanics    Symptom irritability: lowUnderstanding of Dx/Px/POC: excellent   Prognosis: good    Goals  Goals  STG 30 days    1  Patient will improve static balance with feet together Eyes Closed Firm surface to 30 seconds indicating reduction in fall risk  2   Patient will display 2 3  second improvement with overall score of less than 12 seconds  with TUG test or lower with noted improvement being Minimal Detectable Change pre current research standards with fall risk  3  Patient will achieve 45/56 MOFFETT score with minimal improve by 6 points or more demonstrating Minimal Detectable change per current research standards for this objective test which assess fall risk - MET   4  Patient will increase gait speed to > 0 70 m/s with 10 Meter Walk test, demonstrating Minimal Detectable change per current research standards for this objective test which assess fall risk - MET  5  Patient will perform  5 x sit to stand test with overall reduction by seconds to less than 13 seconds indicating improvement with functional endurance  6  Patient will increase ABC score to 60% or higher to reduce fall risk and improve balance confidence  LT days   1  Patient will score low risk for falls with 3/4 fall risk measures   2  Patient will be able to perform floor transfer without physical assistance   3  Patient will be able to carry objects without loss of balance  4  Patient will be able to ambulate outdoors without any loss of balance  5  Patient will increase ABC score to 80% confidence to reduce fall risk and improve balance confidence  6  Patient will increase gait speed to 1 1 m/s or higher to reduce fall risk       Cut off score   All date taken from [facility] or Rehab Measures    MOFFETT test: 46/56                                                         5 x STS Test:  MDC: 6 points                                                              MDC: 2 3 seconds   age norms                                                                    Age Norms   61-76 year old = M: 54, F: 53                                       80-80 year old: 11 4 seconds   66-77 year old = M 47,  F: 53                                       80-80 year old: 12 6 seconds    80-80 year old = M46,   F: 53                                       80-80 year old: 14 8 seconds     TUG test:                                                                     10 Meter Walk Test:  MDC: 4 14 seconds                                                      MDC:  59 ft/sec  Cut off score for Falls                                                  Age Norms  > 13 5 seconds community dwelling adults                20-29; M: 4 56 ft/sec F: 4 62 ft/sec  > 32 2 Frail Elderly                                                     30-39: M 4 76 ft/sec  F: 4 68 ft/sec                                                                                       40-49: M: 4 79 ft/sec  F: 4 62 ft/sec  6 Minute Walk Test                                                      50-59: M: 4 76 ft/sec  F: 4 56 ft/sec  MDC: 190 feet                                                              60-69: M: 4 56 ft/sec  F: 4 26 ft/sec  Age Norms                                                                   70-+    M: 4 36 ft/sec  F: 4 16 ft/sec  60-69:    M: 1876 F: 6996  49-80:    M: 1729 F: 6366  80-89 +: M: 3299 F; 1286     Plan  Planned modality interventions: biofeedback, electrical stimulation/Russian stimulation, TENS, thermotherapy: hydrocollator packs and cryotherapy  Planned therapy interventions: abdominal trunk stabilization, activity modification, ADL retraining, balance, ADL training, IADL retraining, joint mobilization, manual therapy, motor coordination training, muscle pump exercises, neuromuscular re-education, behavior modification, body mechanics training, canalith repositioning, patient education, postural training, compression, coordination, therapeutic exercise, stretching, strengthening, therapeutic activities, functional ROM exercises, flexibility, fine motor coordination training, gait training, graded activity, graded exercise, graded motor, home exercise program, therapeutic training and transfer training  Frequency: 2x week  Duration in weeks: 12  Plan of Care beginning date: 2/8/2021  Plan of Care expiration date: 5/3/2021  Treatment plan discussed with: patient and family        Subjective Evaluation    History of Present Illness  Date of onset: 2021  Mechanism of injury: Patient is a [de-identified]year old male reporting to skilled PT with reports of balance deficits  Patient notes increases in losses of balance in the past few months, notes one fall when he fell on his shoulder  Patient   Recently lost over 100 lbs, notes challenges with low back pain as well  Patient notes bilateral feet pain, patient challenged with overall balance and limits of stability, notes loss of balance in the dark  Patient notes that he has discomfort in his feet  Patient notes that he has some low back pain today due to plowing  (Update 21)  He reports he had no falls or LOB next week and he believes it is from his PT  He reports that his back pain from a couple weeks ago is feeling better  Not a recurrent problem   Quality of life: excellent    Pain  Current pain rating: 3  At best pain ratin  At worst pain ratin  Quality: dull ache  Relieving factors: change in position  Aggravating factors: walking  Progression: no change    Social Support  Steps to enter house: yes  Stairs in house: yes   Lives with: spouse    Employment status: working  Hand dominance: right      Diagnostic Tests  No diagnostic tests performed        Objective           Precautions:  Past Medical History:   Diagnosis Date    Arthritis     [facility] fl[facility]     Chronic kidney disease     stage 3    Coronary artery disease     2 stents    Fluid retention     Gout     Heart failure ([facility]     pacemaker    Hypertension     Pacemaker     Pulmonary emphysema ([facility]     Radiculopathy     last assessed 16     Shortness of breath     exertion    Sleep apnea     c pap           Outcome Measures 2/8/21 3/10/21 PN  21          5x sit to stand 12 63 seconds no UE 14 12 sec 14 5 sec          30 second sit to stand 12 no UE 10 no UE           TUG 9 66 sec 9 19 sec 12 25          Gait Speed   84 m/s 0 77 m/s 0 76 m/s 2 minute walk test 150 feet 220 ft in 1:35 220 ft in 1:39          [place] 39/56 44/56 47/56          mCTSIB FTEO- 30 sec  FTEC- 30 sec   FTEO- 30 sec FTEC-7 5 sec FTEO- 30 sec  FTEC- 30 sec  FTEO w/ foam- 30 sec  FTEC w/ foam- 7 61 sec FTEO- 30 sec  FTEC- 30 sec  FTEO w/ foam- 30 sec  FTEC w/ foam- 25 37 sec          2 minute walk teest 150 feet                                                                Ther Ex                                                                                                                     Ther Activity                                       Gait Training                                       Modalities

## 2021-04-20 ENCOUNTER — TELEPHONE (OUTPATIENT)
Dept: NEPHROLOGY | Facility: CLINIC | Age: 81
End: 2021-04-20

## 2021-04-20 ENCOUNTER — TELEPHONE (OUTPATIENT)
Dept: PULMONOLOGY | Facility: CLINIC | Age: 81
End: 2021-04-20

## 2021-04-20 DIAGNOSIS — R93.89 ABNORMAL CHEST CT: Primary | ICD-10-CM

## 2021-04-20 NOTE — TELEPHONE ENCOUNTER
Patients wife Garrick France calling saying Louise Vang had a CT Renal scan on 4/8 and it showed tree in bud opacities and she would like Dr Spencer Sawant look over the images/results and if he has any recommendations  Please advise

## 2021-04-20 NOTE — TELEPHONE ENCOUNTER
CT scan shows left-sided small nonobstructing calculus  No hydronephrosis  No acute intervention needed from renal standpoint unless he has significant flank pain issues along with urinary complaint or any gross hematuria given left-sided kidney stone  There are also some pulmonary findings that he should certainly call pulmonary or PCP to discuss

## 2021-04-20 NOTE — TELEPHONE ENCOUNTER
Patient's wife called to see if you received the results of the CT scan that was ordered by Dr Aroldo Reina  Patient had it done at [facility]

## 2021-04-21 ENCOUNTER — TELEPHONE (OUTPATIENT)
Dept: FAMILY MEDICINE CLINIC | Facility: CLINIC | Age: 81
End: 2021-04-21

## 2021-04-21 ENCOUNTER — OFFICE VISIT (OUTPATIENT)
Dept: PHYSICAL THERAPY | Facility: CLINIC | Age: 81
End: 2021-04-21
Payer: MEDICARE

## 2021-04-21 DIAGNOSIS — R26.81 GAIT INSTABILITY: Primary | ICD-10-CM

## 2021-04-21 PROCEDURE — 97112 NEUROMUSCULAR REEDUCATION: CPT | Performed by: PHYSICAL THERAPIST

## 2021-04-21 NOTE — TELEPHONE ENCOUNTER
Wife Garrick France left a message on the test result hotline requesting a call back - "I was wondering what do I do next"? (regarding "results" She didn't specify what exactly she is referring to [facility] LPN

## 2021-04-21 NOTE — PROGRESS NOTES
Daily Note   PN (3-)    Today's date: 2021  Patient name: Misa Lucero  : 1940  MRN: [id number]  Referring provider: Silvio Ramos DPM  Dx:   Encounter Diagnosis     ICD-10-CM    1  Gait instability  R26 81                   Subjective: Patient reports to therapy stating he has been having pain in his knees and feet        Objective: See treatment diary below      NM  -STS w/ foam- 20 reps, 1 airex on chairs  -FTEO w/ foam: 60 sec, 2x  -FTEC w/ foam: 30 sec, 2x  -Tandem stance- 30 sec, 4x  -Hurdles FWD: 6", 5 laps  -Hurdles LAT: 6", 2 laps    -Ambulation to lobby- 120 ft, 53 seconds        Assessment: Patient tolerated treatment well  Patient challenged with posterior balance reactions during STS exercises, showing 2 LOB posteriorly  He remains challenged with SOB that limits exercises  Discussed with patient improvements with balance and agreed to discharge next week  He will continue to benefit from skilled PT services to improve his balance and endurance  Plan: Continue per plan of care  D/C next week     Precautions:  Past Medical History:   Diagnosis Date    Arthritis     [facility] fl[facility]     Chronic kidney disease     stage 3    Coronary artery disease     2 stents    Fluid retention     Gout     [facility]     pacemaker    Hypertension     Pacemaker     [facility]     Radiculopathy     last assessed 16     Shortness of breath     exertion    Sleep apnea     c pap           Outcome Measures 2/8/21 3/10/21           5x sit to stand 12 63 seconds no UE 14 12 sec           30 second sit to stand 12 no UE 10 no UE           TUG 9 66 sec 9 19 sec           Gait Speed   84 m/s 0 77 m/s           2 minute walk test 150 feet 220 ft in 1:35           [place] 39/56 44/56           mCTSIB FTEO- 30 sec  FTEC- 30 sec   FTEO- 30 sec FTEC-7 5 sec FTEO- 30 sec  FTEC- 30 sec  FTEO w/ foam- 30 sec  FTEC w/ foam- 7 61 sec           2 minute walk teest 150 feet                                                                Ther Ex                                                                                                                     Ther Activity                                       Gait Training                                       Modalities

## 2021-04-21 NOTE — TELEPHONE ENCOUNTER
Called and discussed   Pt is asymptomatic from lung standpoint   Discussed will repeat CT chest in 8 weeks   Ordered CT scan

## 2021-04-21 NOTE — TELEPHONE ENCOUNTER
She spoke with his Pulmonologist office today (see message in chart)  and they want him to go for another CXR in 3 months  Wife wants to know if he should be doing anything else? That message said he is a symptomic but she still wants to know your thoughts  Dr Vizcarra Me wants him to go for KUB test in September as a follow up from the Renal CT Scan  Again, she is asking if you feel there is anything more he should be doing based on the CT scan results   Vikram Garg

## 2021-04-21 NOTE — TELEPHONE ENCOUNTER
I spoke with patients wife, Lui Guardado and explained the following:  CT scan shows left-sided small nonobstructing calculus  No hydronephrosis  No acute intervention needed from renal standpoint unless he has significant flank pain issues along with urinary complaint or any gross hematuria given left-sided kidney stone -Patient is not currently experiencing any symptoms but was instructed to call us if any arise -Also scheduled f/u 07/01/2021  There are also some pulmonary findings that he should certainly call pulmonary or PCP to discuss -Sherry stated she already spoke with both PCP and pulmonology-patient to have CT Chest WO contrast done  In future

## 2021-04-22 NOTE — TELEPHONE ENCOUNTER
S/w pt and wife  Told them I feel the plan is reasonable  Just has a "normal" cough but not severe  Monitor sx  F/u if fever or discolored phlegm  They are agreeable

## 2021-04-26 ENCOUNTER — OFFICE VISIT (OUTPATIENT)
Dept: PHYSICAL THERAPY | Facility: CLINIC | Age: 81
End: 2021-04-26
Payer: MEDICARE

## 2021-04-26 DIAGNOSIS — R26.81 GAIT INSTABILITY: Primary | ICD-10-CM

## 2021-04-26 PROCEDURE — 97112 NEUROMUSCULAR REEDUCATION: CPT | Performed by: PHYSICAL THERAPIST

## 2021-04-26 NOTE — PROGRESS NOTES
Daily Note   PN (2021)    Today's date: 2021  Patient name: Sarah Juares  : 1940  MRN: [id number]  Referring provider: Calvin Estrada DPM  Dx:   Encounter Diagnosis     ICD-10-CM    1  Gait instability  R26 81                   Subjective: Patient reports to therapy feeling good this morning, states his back pain is pretty much gone        Objective: See treatment diary below      NM  -STS w/ foam- 20 reps, 1 airex on chair  -Hurdles FWD: 6", 10 laps  -4-square hurdles: 5 laps CW, 2x  -4-square hurdles:  5 laps CCW, 2x    -Ambulation to lobby- 120 ft, 53 seconds        Assessment: Patient tolerated treatment well  4-square hurdles added to work on patient balance with change of direction and help improve foot clearance  He is challenged with his functional endurance that limits his exercises  Patient will benefit from skilled PT services to improve his balance and endurance  Plan: Continue per plan of care  D/C Wednesday      Precautions:  Past Medical History:   Diagnosis Date    Arthritis     [facility] flutter [facility]     Chronic kidney disease     stage 3    Coronary artery disease     2 stents    Fluid retention     Gout     [facility]     pacemaker    Hypertension     Pacemaker     [facility] emph[facility]ma ([facility]     Radiculopathy     last assessed 16     Shortness of breath     exertion    Sleep apnea     c pap           Outcome Measures 2/8/21 3/10/21           5x sit to stand 12 63 seconds no UE 14 12 sec           30 second sit to stand 12 no UE 10 no UE           TUG 9 66 sec 9 19 sec           Gait Speed   84 m/s 0 77 m/s           2 minute walk test 150 feet 220 ft in 1:35           [place] 39/56 44/56           mCTSIB FTEO- 30 sec  FTEC- 30 sec   FTEO- 30 sec FTEC-7 5 sec FTEO- 30 sec  FTEC- 30 sec  FTEO w/ foam- 30 sec  FTEC w/ foam- 7 61 sec           2 minute walk teest 150 feet                                                                Ther Ex Ther Activity                                       Gait Training                                       Modalities

## 2021-04-28 ENCOUNTER — OFFICE VISIT (OUTPATIENT)
Dept: PHYSICAL THERAPY | Facility: CLINIC | Age: 81
End: 2021-04-28
Payer: MEDICARE

## 2021-04-28 DIAGNOSIS — R26.81 GAIT INSTABILITY: Primary | ICD-10-CM

## 2021-04-28 PROCEDURE — 97112 NEUROMUSCULAR REEDUCATION: CPT | Performed by: PHYSICAL THERAPIST

## 2021-04-28 NOTE — PROGRESS NOTES
Daily Note/Discharge Note   PN (2021)    Today's date: 2021  Patient name: Viry Carreon  : 1940  MRN: [id number]  Referring provider: Reinaldo River DPM  Dx:   Encounter Diagnosis     ICD-10-CM    1  Gait instability  R26 81                   Subjective: Patient reports to therapy feeling good this morning, reports that he thinks his balance problem is "cured"        Objective: See treatment diary below      NM  - Hurdles FWD: 6", 4 laps x 2 sets  - Hurdles LAT: 6", 3 laps x 2 sets  - Step taps: 6" step, 20x      -Ambulation to lobby- 120 ft, 54 seconds        Assessment: Patient tolerated treatment well  Patient requests to be discharged from PT due to subjective improvements with his balance and gait  PT and patient discussed improvements with objective measures and agreed to discharge  Educated patient on importance of rest breaks when working and ability to return to therapy if needed  At this time patient to be discharged from PT services  Goals  Goals  STG 30 days    1  Patient will improve static balance with feet together Eyes Closed Firm surface to 30 seconds indicating reduction in fall risk- D/C  2  Patient will display 2 3  second improvement with overall score of less than 12 seconds  with TUG test or lower with noted improvement being Minimal Detectable Change pre current research standards with fall risk  - D/C  3  Patient will achieve 45/56 MOFFETT score with minimal improve by 6 points or more demonstrating Minimal Detectable change per current research standards for this objective test which assess fall risk - MET   4  Patient will increase gait speed to > 0 70 m/s with 10 Meter Walk test, demonstrating Minimal Detectable change per current research standards for this objective test which assess fall risk - MET  5  Patient will perform  5 x sit to stand test with overall reduction by seconds to less than 13 seconds indicating improvement with functional endurance- D/C  6  Patient will increase ABC score to 60% or higher to reduce fall risk and improve balance confidence  - D/C     LT days   1  Patient will score low risk for falls with 3/4 fall risk measures - D/C  2  Patient will be able to perform floor transfer without physical assistance - MET  3  Patient will be able to carry objects without loss of balance- MET  4  Patient will be able to ambulate outdoors without any loss of balance- MET  5  Patient will increase ABC score to 80% confidence to reduce fall risk and improve balance confidence  - D/C   6  Patient will increase gait speed to 1 1 m/s or higher to reduce fall risk  - MET      Plan: Discharge      Precautions:  Past Medical History:   Diagnosis Date    Arthritis     [facility]     Chronic kidney disease     stage 3    Coronary artery disease     2 stents    Fluid retention     Gout     [facility] failure ([facility]     pacemaker    Hypertension     Pacemaker     Pulmonary emphysema ([facility]     Radiculopathy     last assessed 16     Shortness of breath     exertion    Sleep apnea     c pap       Outcome Measures 2/8/21 3/10/21 PN  21          5x sit to stand 12 63 seconds no UE 14 12 sec 14 5 sec          30 second sit to stand 12 no UE 10 no UE           TUG 9 66 sec 9 19 sec 12 25          Gait Speed   84 m/s 0 77 m/s 0 76 m/s          2 minute walk test 150 feet 220 ft in 1:35 220 ft in 1:39          [place] 39/56 44/56 47/56          mCTSIB FTEO- 30 sec  FTEC- 30 sec   FTEO- 30 sec FTEC-7 5 sec FTEO- 30 sec  FTEC- 30 sec  FTEO w/ foam- 30 sec  FTEC w/ foam- 7 61 sec FTEO- 30 sec  FTEC- 30 sec  FTEO w/ foam- 30 sec  FTEC w/ foam- 25 37 sec          2 minute walk teest 150 feet

## 2021-05-02 DIAGNOSIS — I10 ESSENTIAL HYPERTENSION: ICD-10-CM

## 2021-05-02 DIAGNOSIS — I25.10 CORONARY ARTERY ARTERIOSCLEROSIS: ICD-10-CM

## 2021-05-03 DIAGNOSIS — J41.8 MIXED SIMPLE AND MUCOPURULENT CHRONIC BRONCHITIS (HCC): ICD-10-CM

## 2021-05-03 RX ORDER — IPRATROPIUM BROMIDE AND ALBUTEROL SULFATE 2.5; .5 MG/3ML; MG/3ML
3 SOLUTION RESPIRATORY (INHALATION) 3 TIMES DAILY
Qty: 90 ML | Refills: 11 | Status: SHIPPED | OUTPATIENT
Start: 2021-05-03 | End: 2021-06-02

## 2021-05-05 RX ORDER — FELODIPINE 5 MG/1
TABLET, EXTENDED RELEASE ORAL
Qty: 90 TABLET | Refills: 1 | Status: SHIPPED | OUTPATIENT
Start: 2021-05-05 | End: 2021-08-17 | Stop reason: SDUPTHER

## 2021-05-05 RX ORDER — SIMVASTATIN 20 MG
TABLET ORAL
Qty: 90 TABLET | Refills: 1 | Status: SHIPPED | OUTPATIENT
Start: 2021-05-05 | End: 2021-08-17 | Stop reason: SDUPTHER

## 2021-05-05 RX ORDER — CLOPIDOGREL BISULFATE 75 MG/1
TABLET ORAL
Qty: 90 TABLET | Refills: 1 | OUTPATIENT
Start: 2021-05-05

## 2021-05-06 ENCOUNTER — TELEPHONE (OUTPATIENT)
Dept: FAMILY MEDICINE CLINIC | Facility: CLINIC | Age: 81
End: 2021-05-06

## 2021-05-06 DIAGNOSIS — I25.10 CORONARY ARTERY ARTERIOSCLEROSIS: ICD-10-CM

## 2021-05-06 RX ORDER — CLOPIDOGREL BISULFATE 75 MG/1
75 TABLET ORAL DAILY
Qty: 90 TABLET | Refills: 1 | Status: SHIPPED | OUTPATIENT
Start: 2021-05-06 | End: 2021-08-17 | Stop reason: SDUPTHER

## 2021-05-06 NOTE — TELEPHONE ENCOUNTER
[facility] Malou called about her  and wanted to let Dr PENA know that they spoke with patients Cardiologist ans they told him he  "has to take Zyrelto and Clopidogrel together or patient will have a heart attack and die "    Please advise

## 2021-05-07 NOTE — TELEPHONE ENCOUNTER
Please let them know I sent the rx refill for the plavix since apparently Dr Bee Check feels somewhat strongly about him being on both

## 2021-05-14 DIAGNOSIS — E79.0 HYPERURICEMIA: ICD-10-CM

## 2021-05-15 RX ORDER — ALLOPURINOL 100 MG/1
200 TABLET ORAL DAILY
Qty: 180 TABLET | Refills: 3 | Status: SHIPPED | OUTPATIENT
Start: 2021-05-15 | End: 2022-01-14 | Stop reason: SDUPTHER

## 2021-06-03 ENCOUNTER — OFFICE VISIT (OUTPATIENT)
Dept: PULMONOLOGY | Facility: MEDICAL CENTER | Age: 81
End: 2021-06-03
Payer: MEDICARE

## 2021-06-03 VITALS
TEMPERATURE: 97.2 F | WEIGHT: 279 LBS | RESPIRATION RATE: 16 BRPM | OXYGEN SATURATION: 99 % | DIASTOLIC BLOOD PRESSURE: 64 MMHG | HEIGHT: 73 IN | BODY MASS INDEX: 36.98 KG/M2 | SYSTOLIC BLOOD PRESSURE: 108 MMHG | HEART RATE: 70 BPM

## 2021-06-03 DIAGNOSIS — R06.00 DYSPNEA ON EXERTION: ICD-10-CM

## 2021-06-03 DIAGNOSIS — J43.2 CENTRILOBULAR EMPHYSEMA (HCC): ICD-10-CM

## 2021-06-03 DIAGNOSIS — E66.09 CLASS 2 OBESITY DUE TO EXCESS CALORIES WITHOUT SERIOUS COMORBIDITY WITH BODY MASS INDEX (BMI) OF 36.0 TO 36.9 IN ADULT: ICD-10-CM

## 2021-06-03 DIAGNOSIS — G47.33 OSA (OBSTRUCTIVE SLEEP APNEA): Primary | ICD-10-CM

## 2021-06-03 PROCEDURE — 99214 OFFICE O/P EST MOD 30 MIN: CPT | Performed by: INTERNAL MEDICINE

## 2021-06-03 NOTE — PATIENT INSTRUCTIONS
Use albuterol inhaler when needed, 2 puffs,  such as feeling short of breath or wheezing  Can also consider using it prior to expected increase in activity level       Recommend to follow up with cardiology Dr Rj Blanca for shortness of breath with activity, ask about when to repeat echo, last one was September 2020    [facility]

## 2021-06-03 NOTE — PROGRESS NOTES
Assessment/Plan        Problem List Items Addressed This Visit        Respiratory    Centrilobular emphysema ([address] )      He does use try Trelegy 100 mcg 1 puff daily and continue using this  Last spirometry showed FEV1 of 2 07 L or 67% predicted consistent with moderate COPD  He did have some mild air trapping noted on complete PFT done November 2020  JOVI (obstructive sleep apnea) - Primary     Moose  does have obstructive sleep apnea diagnosed several years ago  He  Does use  Auto BiPAP which is set at maximal inspiratory pressure of 25 and minimal expiratory pressure of 15 cm water with minimum pressure support of 4 and maximum pressure support of 4  On this setting his average inspiratory pressure was 21 and average expiratory pressure 17  He has been compliant with using the BiPAP as review his compliance data from past month  Average AHI is 5 5 which is satisfactory  He will continue at same settings  I did encourage weight loss  Also I did time that he may want to try a full face mask and to contact [facility]  about this  He is not have any excessive daytime somnolence or nocturnal dyspnea            Other    Class 2 obesity due to excess calories without serious comorbidity with body mass index (BMI) of 36 0 to 36 9 in adult      I did encourage him to lose weight         Dyspnea on exertion      Does have some chronic exertional dyspnea  He does have history of atrial fibrillation, chronic kidney disease also has moderate to severe aortic stenosis  He does follow with cardiologist   Dr Wesley Miller     echocardiogram done at [address] [place] [address] in September of 2020 that showed LV systolic function to be normal with ejection fraction of 55-60% but there is moderate to severe aortic stenosis with calculated aortic valve area of 1 0 centimeter sq    Also had some pulmonary artery hypertension which was estimated to be at 55-60 cm water pressure    Pulse oximetry testing:   Room air O2 saturation at rest was 99% and ambulating 250 ft lowest was 97%     he did not have any oxygen desaturation with ambulation today  I did tell him that he should try using his albuterol inhaler 2 puffs prior to doing some exertional activity or going outside for a walk     some of his shortness of breath certainly is cardiac related pie due to some diastolic cardiac dysfunction and his moderate to severe aortic stenosis                 CC: Follow-up, doing well,  Mild shortness of breath at times      HPI     [facility] for follow-up visit regarding his COPD and severe obstructive sleep apnea  A CPAP titration study report from  September 2002 indicated he had severe JOVI with AHI of 95 9 and oxygen sveta of 73%  He is on    [place] also has moderate COPD with FEV1 of 2 07 L or 67% of predicted  He did have mild air trapping on PFT done in  November 2020 total lung capacity was normal   His diffusion capacity measurement was moderately decreased  He is using Trelegy  Inhaler 100 mg  He also has history of atrial fibrillation and chronic kidney disease stage IIIB  He has moderate to severe aortic stenosis with aortic valve area of 1 0 cm  Has history coronary disease and had PTCA  Done in 2019  Folows with Dr Shirley Paris for cardiac issues, next appt in 1 month  Has shortness of breath when doing activities at home, such as gardening  Uses rescue inhaler 3 times a day, including 3 AM, when he wakes up to take water pill  Has occasional cough  He used to weigh 400 lb in the past but through diet he has lost weight  He used to work as a  and retired when he was was in his 76s    He feels that Trelegy 100 mcg inhaler is really help his breathing   He quit smoking in 2018 and smoked 1 pack of cigarettes per day for 50 years      [facility], full mask      Past Medical History:   Diagnosis Date    Arthritis     At[facility] flutter [facility] [address] )     Chronic kidney disease     stage 3    Coronary artery disease     2 stents    Fluid retention     Gout     Heart failure (HCC)     pacemaker    Hypertension     Pacemaker     Pulmonary emphysema ([address] )     Radiculopathy     last assessed 1/28/16     Shortness of breath     exertion    Sleep apnea     c pap       Past Surgical History:   Procedure Laterality Date    ANGIOPLASTY      x2 2 stents and then replaced   [address]      pacemaker permanent placement dual chamber / last assessed 4/7/14 / implantation     CARDIAC SURGERY      pacemaker    CHOLECYSTECTOMY      CORONARY ANGIOPLASTY WITH STENT PLACEMENT      EPIDURAL BLOCK INJECTION N/A 5/26/2016    Procedure: BLOCK / INJECTION EPIDURAL STEROID LUMBAR  L4-5;  Surgeon: Tanya Miguel MD;  Location[facility] MAIN OR;  Service:     EPIDURAL BLOCK INJECTION N/A 2/14/2019    Procedure: L4 L5 Lumbar Epidural Steroid Injection;  Surgeon: Tanya Miguel MD;  Location: [facility] MAIN OR;  Service: Pain Management     EYE SURGERY      cataract left    KNEE ARTHROSCOPY W/ MENISCAL REPAIR Left     LUMBAR EPIDURAL INJECTION N/A 3/17/2016    Procedure: BLOCK / INJECTION LUMBAR  L4-5  (C-ARM); Surgeon: Tanya Miguel MD;  Location[facility] MAIN OR;  Service:          Current Outpatient Medications:     albuterol (Ventolin HFA) 90 mcg/act inhaler, Inhale 2 puffs every 6 (six) hours as needed for wheezing, Disp: 3 Inhaler, Rfl: 3    allopurinol (ZYLOPRIM) 100 mg tablet, Take 2 tablets (200 mg total) by mouth daily, Disp: 180 tablet, Rfl: 3    ascorbic acid (VITAMIN C) 500 mg tablet, Take 500 mg by mouth daily  , Disp: , Rfl:     B Complex Vitamins (B COMPLEX 1 PO), Take 1 tablet by mouth daily  , Disp: , Rfl:     Biotin (BIOTIN 5000) 5 MG CAPS, Take 1,000 mcg by mouth daily  , Disp: , Rfl:     calcium carbonate-vitamin D (OSCAL-D) 500 mg-200 units per tablet, Take 2 tablets by mouth daily at bedtime  , Disp: , Rfl:     clopidogrel (PLAVIX) 75 mg tablet, Take 1 tablet (75 mg total) by mouth daily, Disp: 90 tablet, Rfl: 1    colchicine (COLCRYS) 0 6 mg tablet, Take 1 tablet (0 6 mg total) by mouth 2 (two) times a day Take twice daily x 7 days as needed for gout, Disp: 30 tablet, Rfl: 5    Cranberry 1000 MG CAPS, Take 1 tablet by mouth 2 (two) times a day , Disp: , Rfl:     cyanocobalamin 1000 MCG tablet, Take 100 mcg by mouth daily, Disp: , Rfl:     dextromethorphan-guaiFENesin (ROBITUSSIN DM)  mg/5 mL syrup, Take 10 mL by mouth every 4 (four) hours as needed for cough, Disp: 118 mL, Rfl: 0    felodipine (PLENDIL) 5 mg 24 hr tablet, TAKE 1 TABLET DAILY, Disp: 90 tablet, Rfl: 1    finasteride (PROSCAR) 5 mg tablet, Take 1 tablet (5 mg total) by mouth daily, Disp: 90 tablet, Rfl: 3    Flaxseed, Linseed, (EQL FLAX SEED OIL) 1000 MG CAPS, Take by mouth daily  , Disp: , Rfl:     fluticasone-umeclidinium-vilanterol (Trelegy Ellipta) 100-62 5-25 MCG/INH inhaler, Inhale 1 puff daily Rinse mouth after use , Disp: , Rfl:     furosemide (LASIX) 20 mg tablet, TAKE 2 TABLETS (40 MG) IN AM AND 1 TABLET (20 MG) IN PM, Disp: 270 tablet, Rfl: 3    glucosamine-chondroitin 500-400 MG tablet, Take 1 tablet by mouth 2 (two) times a day , Disp: , Rfl:     lactase (LACTAID) 3,000 units tablet, Take 3,000 Units by mouth as needed  , Disp: , Rfl:     Omega-3 Fatty Acids (FISH OIL) 1,000 mg, Take 1,000 mg by mouth 2 (two) times a day , Disp: , Rfl:     PARoxetine (PAXIL) 20 mg tablet, Take 1 tablet (20 mg total) by mouth daily, Disp: 90 tablet, Rfl: 1    rivaroxaban (XARELTO) 15 mg tablet, Take 1 tablet (15 mg total) by mouth daily with breakfast (Patient taking differently: Take 20 mg by mouth daily at bedtime ), Disp: , Rfl:     simvastatin (ZOCOR) 20 mg tablet, TAKE 1 TABLET DAILY AT     BEDTIME, Disp: 90 tablet, Rfl: 1    fluocinonide (LIDEX) 0 05 % cream, Apply topically 2 (two) times a day Prn leg rash, short term (Patient taking differently: Apply topically as needed Prn leg rash, short term), Disp: 120 g, Rfl: 2    No Known Allergies    Social History     Tobacco Use    Smoking status: Former Smoker     Packs/day: 1 00     Years: 50 00     Pack years: 50 00     Types: Cigarettes     Quit date: 3/27/2019     Years since quittin 2    Smokeless tobacco: Never Used    Tobacco comment: quit 2021   Substance Use Topics    Alcohol use: Never         Family History   Problem Relation Age of Onset    Cancer Mother 80    Heart disease Mother     Hypertension Mother     Heart disease Father     Diabetes Neg Hx     Stroke Neg Hx        Review of Systems   Constitutional: Negative for appetite change and fever  HENT: Positive for ear pain  Negative for postnasal drip, rhinorrhea, sneezing and sore throat  Respiratory: Positive for shortness of breath  Negative for cough and wheezing  Cardiovascular: Negative for chest pain  Gastrointestinal: Positive for abdominal pain  Endocrine: Negative for polydipsia and polyphagia  Genitourinary: Negative for hematuria  Musculoskeletal: Negative for myalgias  Neurological: Negative for headaches  Psychiatric/Behavioral: Negative for decreased concentration  /64 (BP Location: Right arm, Patient Position: Standing)   Pulse 70   Temp (!) 97 2 °F (36 2 °C) (Temporal)   Resp 16   Ht 6' 1" (1 854 m)   Wt 127 kg (279 lb)   SpO2 99%   BMI 36 81 kg/m²     Vitals:    21 0907   BP: 108/64   Pulse:    Resp:    Temp:    SpO2:      Initial BP 98/58, measured in left arm, sitting  Pulse oximetry testing:   Room air O2 saturation at rest was 99% and ambulating 250 ft lowest was 97%    Physical Exam  Vitals reviewed  Constitutional:       General: He is not in acute distress  Appearance: Normal appearance  He is well-developed  He is obese  HENT:      Head: Normocephalic  Nose: Nose normal       Mouth/Throat:      Mouth: Mucous membranes are moist       Pharynx: Oropharynx is clear  No oropharyngeal exudate     Eyes: Conjunctiva/sclera: Conjunctivae normal       Pupils: Pupils are equal, round, and reactive to light  Cardiovascular:      Rate and Rhythm: Normal rate and regular rhythm  Heart sounds: Murmur heard  Comments: There is a grade 2 systolic ejection murmur  Pulmonary:      Effort: Pulmonary effort is normal       Comments: Faint wheezing LLL  Abdominal:      General: There is no distension  Palpations: Abdomen is soft  Tenderness: There is no abdominal tenderness  Musculoskeletal:      Cervical back: Neck supple  Right lower leg: No edema  Left lower leg: No edema  Comments: No cyanosis or clubbing   Lymphadenopathy:      Cervical: No cervical adenopathy  Skin:     General: Skin is warm and dry  Neurological:      Mental Status: He is alert and oriented to person, place, and time  Psychiatric:         Mood and Affect: Mood normal          Behavior: Behavior normal          Thought Content:  Thought content normal                   Answers for HPI/ROS submitted by the patient on 6/1/2021   Primary symptoms  Chronicity: recurrent  Do you have shortness of breath that occurs with effort or exertion?: No  Do you have ear congestion?: No  Do you have heartburn?: No  Do you have fatigue?: No  Do you have nasal congestion?: No  Do you have shortness of breath when lying flat?: No  Do you have shortness of breath when you wake up?: No  Do you have sweats?: No  Risk factors for lung disease: smoking/tobacco exposure

## 2021-06-10 ENCOUNTER — OFFICE VISIT (OUTPATIENT)
Dept: PODIATRY | Facility: CLINIC | Age: 81
End: 2021-06-10
Payer: MEDICARE

## 2021-06-10 VITALS
BODY MASS INDEX: 36.98 KG/M2 | HEIGHT: 73 IN | SYSTOLIC BLOOD PRESSURE: 124 MMHG | DIASTOLIC BLOOD PRESSURE: 82 MMHG | WEIGHT: 279 LBS | RESPIRATION RATE: 17 BRPM

## 2021-06-10 DIAGNOSIS — B35.1 ONYCHOMYCOSIS: ICD-10-CM

## 2021-06-10 DIAGNOSIS — B35.9 DERMATOPHYTOSIS: ICD-10-CM

## 2021-06-10 DIAGNOSIS — M77.41 METATARSALGIA OF BOTH FEET: Primary | ICD-10-CM

## 2021-06-10 DIAGNOSIS — M54.16 RADICULOPATHY OF LUMBAR REGION: ICD-10-CM

## 2021-06-10 DIAGNOSIS — I70.209 PERIPHERAL ARTERIOSCLEROSIS (HCC): ICD-10-CM

## 2021-06-10 DIAGNOSIS — M77.42 METATARSALGIA OF BOTH FEET: Primary | ICD-10-CM

## 2021-06-10 PROCEDURE — 99213 OFFICE O/P EST LOW 20 MIN: CPT | Performed by: PODIATRIST

## 2021-06-10 NOTE — PROGRESS NOTES
Assessment/Plan:  Pain upon ambulation   Limb pain   Rule out radiculopathy   Edema secondary to venous insufficiency   Rule out degenerative disc disease   Probable spinal stenosis   Dermatophytosis   Mycosis of nail      Plan   Patient advised on condition   All nails debrided   Calluses debrided without pain or complication   Patient will follow up with pain management and acupuncture specialists   In addition all abnormal skin debrided without pain or complication      In addition arterial Doppler testing will be ordered to rule out extent of peripheral artery disease   Patient is also being [facility] to help with gait instability due to radiculopathy         Subjective:  Patient complains of pain in his feet with ambulation   He has pain in his back and legs   He is doing well with acupuncture   He complains of pain with shoe wear   No history of trauma                 Past Medical History:   Diagnosis Date    Arthritis      Atrial flutter (HCC)      Chronic kidney disease       stage 3    Coronary artery disease       2 stents    Fluid retention      Gout      Heart failure [address] [address] )       pacemaker    Hypertension      Pacemaker      Pulmonary emphysema ([facility] [address] )      Radiculopathy       last assessed 1/28/16     Shortness of breath       exertion    Sleep apnea       c pap                   Past Surgical History:   Procedure Laterality Date    ANGIOPLASTY         x2 2 stents and then replaced    CARDIAC PACEMAKER PLACEMENT         pacemaker permanent placement dual chamber / last assessed 4/7/14 / implantation     CARDIAC SURGERY         pacemaker    CHOLECYSTECTOMY        CORONARY ANGIOPLASTY WITH STENT PLACEMENT        EPIDURAL BLOCK INJECTION N/A 5/26/2016     Procedure: BLOCK / INJECTION EPIDURAL STEROID LUMBAR  L4-5;  Surgeon: Radha Casas MD;  Location: [place] [address] MAIN OR;  Service:     EYE SURGERY         cataract left    KNEE ARTHROSCOPY W/ MENISCAL REPAIR Left      LUMBAR EPIDURAL INJECTION N/A 3/17/2016     Procedure: BLOCK / INJECTION LUMBAR  L4-5  (C-ARM);  Surgeon: Karolyn Eduardo MD;  Location: [facility] MAIN OR;  Service:          No Known Allergies        Current Outpatient Prescriptions:     albuterol (VENTOLIN HFA) 90 mcg/act inhaler, Inhale 2 puffs every 6 (six) hours as needed for wheezing, Disp: 1 Inhaler, Rfl: 6    allopurinol (ZYLOPRIM) 100 mg tablet, Take 1 tablet (100 mg total) by mouth daily, Disp: 90 tablet, Rfl: 3    ascorbic acid (VITAMIN C) 500 mg tablet, Take 500 mg by mouth daily  , Disp: , Rfl:     B Complex Vitamins (B COMPLEX 1 PO), Take 1 tablet by mouth daily  , Disp: , Rfl:     Biotin (BIOTIN 5000) 5 MG CAPS, Take 1,000 mcg by mouth daily  , Disp: , Rfl:     calcium carbonate-vitamin D (OSCAL-D) 500 mg-200 units per tablet, Take 2 tablets by mouth daily at bedtime  , Disp: , Rfl:     ciclopirox (LOPROX) 0 77 % cream, Apply topically 2 (two) times a day for 20 days, Disp: 45 g, Rfl: 2    clopidogrel (PLAVIX) 75 mg tablet, TAKE 1 TABLET DAILY, Disp: 90 tablet, Rfl: 3    collagenase (SANTYL) ointment, Apply 1 application topically daily  , Disp: , Rfl:     Cranberry 1000 MG CAPS, Take 1 tablet by mouth 2 (two) times a day , Disp: , Rfl:     cyanocobalamin 1000 MCG tablet, Take 100 mcg by mouth daily, Disp: , Rfl:     felodipine (PLENDIL) 5 mg 24 hr tablet, TAKE 1 TABLET DAILY, Disp: 90 tablet, Rfl: 3    finasteride (PROSCAR) 5 mg tablet, Take 1 tablet (5 mg total) by mouth daily, Disp: 90 tablet, Rfl: 3    Flaxseed, Linseed, (EQL FLAX SEED OIL) 1000 MG CAPS, Take by mouth daily  , Disp: , Rfl:     fluocinonide (LIDEX) 0 05 % cream, Apply topically 2 (two) times a day for 30 days, Disp: 30 g, Rfl: 2    fluticasone-salmeterol (ADVAIR) 250-50 mcg/dose inhaler, Inhale 1 puff 2 (two) times a day Rinse mouth after use , Disp: 1 Inhaler, Rfl: 6    furosemide (LASIX) 40 mg tablet, TAKE 1 TABLET BY MOUTH DAILY AS NEEDED FOR LEG EDEMA OR DYSNEA, Disp: 30 tablet, Rfl: 0    gabapentin (NEURONTIN) 300 mg capsule, TAKE 1 CAPSULE (300 MG TOTAL) BY MOUTH 2 (TWO) TIMES A DAY FOR 30 DAYS, Disp: 60 capsule, Rfl: 2    gabapentin (NEURONTIN) 300 mg capsule, Take 1 capsule (300 mg total) by mouth 3 (three) times a day for 30 days, Disp: 90 capsule, Rfl: 0    gabapentin (NEURONTIN) 600 MG tablet, Take 1 tablet (600 mg total) by mouth 3 (three) times a day for 30 days, Disp: 60 tablet, Rfl: 0    glucosamine-chondroitin 500-400 MG tablet, Take 1 tablet by mouth 2 (two) times a day , Disp: , Rfl:     hydrOXYzine HCL (ATARAX) 25 mg tablet, Take 1 tablet (25 mg total) by mouth 3 (three) times a day for 3 days, Disp: 9 tablet, Rfl: 0    lactase (LACTAID) 3,000 units tablet, Take 3,000 Units by mouth as needed  , Disp: , Rfl:     methylPREDNISolone 4 MG tablet therapy pack, Use as directed on package, Disp: 21 tablet, Rfl: 0    Omega-3 Fatty Acids (FISH OIL) 1,000 mg, Take 1,000 mg by mouth 2 (two) times a day , Disp: , Rfl:     oxyCODONE-acetaminophen (PERCOCET) 5-325 mg per tablet, 1 tablet to be taken 3 times daily as needed for leg pain, Disp: 30 tablet, Rfl: 0    PARoxetine (PAXIL) 20 mg tablet, TAKE 1/2 TABLET BY MOUTH DAILY, Disp: 30 tablet, Rfl: 0    psyllium (METAMUCIL) 58 6 % powder, Take 1 packet by mouth daily Indications: 1 tsp , Disp: , Rfl:     rivaroxaban (XARELTO) tablet, Take 20 mg by mouth , Disp: , Rfl:     simvastatin (ZOCOR) 20 mg tablet, Take 1 tablet (20 mg total) by mouth daily at bedtime, Disp: 90 tablet, Rfl: 3    traMADol (ULTRAM) 50 mg tablet, 1 tablet to be taken twice daily as needed for foot and leg pain, Disp: 30 tablet, Rfl: 0           Patient Active Problem List   Diagnosis    Chronic pain disorder    Bilateral lumbar radiculopathy    Lumbar canal stenosis    Acquired ankle/foot deformity    Pain in joints of both feet    Dermatophytosis    Atherosclerosis of arteries of extremities (HCC)    Pain in both feet    Onychomycosis  Neck muscle spasm    Anxiety 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    Benign hypertension with CKD (chronic kidney disease) stage III (HCC)    Bilateral leg edema    CKD (chronic kidney disease), stage III (HCC)    Microscopic hematuria    Proteinuria    Urinary frequency    Atrial fibrillation (HCC)    Benign prostatic hyperplasia    Congestive heart failure (HCC)    Arteriosclerosis of coronary artery    Allergic rhinitis    Aneurysm of abdominal aorta (HCC)    Angina pectoris (HCC)    Pain in joint involving ankle and foot    Atrial flutter (HCC)    Carpal tunnel syndrome    Chronic gout without tophus    Chronic low back pain    Chronic obstructive pulmonary disease (HCC)    Colon, diverticulosis    Deep venous insufficiency    Degenerative disc disease, lumbar    Difficulty in walking    Fecal soiling    Fibromyalgia    Fistula-in-ano    Hyperlipidemia    Insomnia, persistent    Memory loss    Moderate or severe vision impairment, one eye    Morbid obesity (HCC)    Nicotine dependence    Osteoarthritis of knee    Pacemaker    Sleep apnea    Urinary incontinence    Venous insufficiency (chronic) (peripheral)    BMI 45 0-49 9, adult (HCC)    Carbuncle of neck    Carbuncle of occipital region of scalp    Elevated glucose    Peripheral arteriosclerosis (HCC)    Radiculopathy of lumbar region    Metatarsalgia of both feet          Physical Exam  Left Foot: Appearance: Normal except as noted: excessive pronation-- and-- pes planus  Second toe deformities include hammer toe  Third toe deformities include hammer toe  Forth toe deformities include hammer toe  Fifth toe deformities include hammer toe  Tenderness: None except the great toe,-- distal first metatarsal,-- distal fifth metatarsal-- and-- insertion of the plantar fascia  Positive Brian sign third left intermetatarsal space  ROM: Full   Motor: Normal   Right Foot: Appearance: Normal except as noted: excessive pronation-- and-- pes planus  Second toe deformities include hammer toe  Third toe deformities include hammer toe  Forth toe deformities include hammer toe  Fifth toe deformities include hammer toe  Evaluation of the great toe nail demonstrates an ingrown nail  Tenderness: None except the insertion of the plantar fascia  Left Ankle: Appearance: Normal except erythema,-- swelling-- and-- swelling laterally  Tenderness: None except the tibiotalar joint  Right Ankle: ROM: Full  Neurological Exam: performed  Deep tendon reflexes: + tinel of right tibial nerve  Vascular Exam: performed Dorsalis pedis pulses were present bilaterally  Posterior tibial pulses were present bilaterally  Elevation Pallor: absent bilaterally  Capillary refill time was less than 1 second bilaterally  Edema: moderate bilaterally-- and-- 4/7 pitting  neg  buddy  Toenails: All of the toenails were elongated,-- hypertrophied,-- discolored,-- ingrown,-- shown to have subungual debris,-- tender-- and-- Mycotic with onychauxis  Hyperkeratosis: present on both first toes,-- present on both first sub metatarsals-- and-- present on both fifth sub metatarsals  Shoe Gear Evaluation: performed ()   Recommendation(s): extra depth diabetic shoes

## 2021-06-20 NOTE — ASSESSMENT & PLAN NOTE
Does have some chronic exertional dyspnea  He does have history of atrial fibrillation, chronic kidney disease also has moderate to severe aortic stenosis  He does follow with cardiologist   Dr Ligia Fontanez     echocardiogram done at [address] [place] [address] in September of 2020 that showed LV systolic function to be normal with ejection fraction of 55-60% but there is moderate to severe aortic stenosis with calculated aortic valve area of 1 0 centimeter sq  Also had some pulmonary artery hypertension which was estimated to be at 55-60 cm water pressure    Pulse oximetry testing:   Room air O2 saturation at rest was 99% and ambulating 250 ft lowest was 97%     he did not have any oxygen desaturation with ambulation today    I did tell him that he should try using his albuterol inhaler 2 puffs prior to doing some exertional activity or going outside for a walk     some of his shortness of breath certainly is cardiac related pie due to some diastolic cardiac dysfunction and his moderate to severe aortic stenosis

## 2021-06-20 NOTE — ASSESSMENT & PLAN NOTE
Davide Gleason  does have obstructive sleep apnea diagnosed several years ago  He  Does use  Auto BiPAP which is set at maximal inspiratory pressure of 25 and minimal expiratory pressure of 15 cm water with minimum pressure support of 4 and maximum pressure support of 4  On this setting his average inspiratory pressure was 21 and average expiratory pressure 17  He has been compliant with using the BiPAP as review his compliance data from past month  Average AHI is 5 5 which is satisfactory  He will continue at same settings  I did encourage weight loss  Also I did time that he may want to try a full face mask and to contact [facility]  about this      He is not have any excessive daytime somnolence or nocturnal dyspnea

## 2021-06-20 NOTE — ASSESSMENT & PLAN NOTE
He does use try Trelegy 100 mcg 1 puff daily and continue using this  Last spirometry showed FEV1 of 2 07 L or 67% predicted consistent with moderate COPD  He did have some mild air trapping noted on complete PFT done November 2020

## 2021-06-25 ENCOUNTER — TELEPHONE (OUTPATIENT)
Dept: NEPHROLOGY | Facility: CLINIC | Age: 81
End: 2021-06-25

## 2021-06-25 DIAGNOSIS — N42.9 PROSTATE DISORDER: ICD-10-CM

## 2021-06-25 RX ORDER — FINASTERIDE 5 MG/1
5 TABLET, FILM COATED ORAL DAILY
Qty: 90 TABLET | Refills: 3 | Status: SHIPPED | OUTPATIENT
Start: 2021-06-25

## 2021-06-25 NOTE — TELEPHONE ENCOUNTER
I left detailed message for patient regarding lab/urine studies to be completed prior to f/u on 07/01/2021-informed if going through  [facility]  lab scripts are in system if going elsewhere asked to call us so we can send accordingly

## 2021-06-29 NOTE — TELEPHONE ENCOUNTER
Patients wife called to [facility] patient had labs done through [facility] on 06/24/21- called and spoke with Gunnar Bañuelos who  states she is faxing results

## 2021-07-01 ENCOUNTER — OFFICE VISIT (OUTPATIENT)
Dept: NEPHROLOGY | Facility: CLINIC | Age: 81
End: 2021-07-01
Payer: MEDICARE

## 2021-07-01 VITALS
HEART RATE: 70 BPM | SYSTOLIC BLOOD PRESSURE: 118 MMHG | HEIGHT: 73 IN | DIASTOLIC BLOOD PRESSURE: 64 MMHG | WEIGHT: 282.8 LBS | BODY MASS INDEX: 37.48 KG/M2

## 2021-07-01 DIAGNOSIS — N25.81 SECONDARY HYPERPARATHYROIDISM OF RENAL ORIGIN (HCC): ICD-10-CM

## 2021-07-01 DIAGNOSIS — I12.9 BENIGN HYPERTENSION WITH CHRONIC KIDNEY DISEASE, STAGE III (HCC): ICD-10-CM

## 2021-07-01 DIAGNOSIS — E55.9 VITAMIN D INSUFFICIENCY: ICD-10-CM

## 2021-07-01 DIAGNOSIS — N18.30 BENIGN HYPERTENSION WITH CHRONIC KIDNEY DISEASE, STAGE III (HCC): ICD-10-CM

## 2021-07-01 DIAGNOSIS — R80.1 PERSISTENT PROTEINURIA: ICD-10-CM

## 2021-07-01 DIAGNOSIS — R60.0 BILATERAL LEG EDEMA: ICD-10-CM

## 2021-07-01 DIAGNOSIS — E79.0 HYPERURICEMIA: ICD-10-CM

## 2021-07-01 DIAGNOSIS — N18.32 STAGE 3B CHRONIC KIDNEY DISEASE (HCC): Primary | ICD-10-CM

## 2021-07-01 PROCEDURE — 99214 OFFICE O/P EST MOD 30 MIN: CPT | Performed by: INTERNAL MEDICINE

## 2021-07-01 RX ORDER — MELATONIN
1000 DAILY
COMMUNITY

## 2021-07-01 NOTE — PROGRESS NOTES
NEPHROLOGY OUTPATIENT PROGRESS NOTE   Daryl Flores 80 y o  male MRN: [id number]  DATE: 7/1/2021  Reason for visit:   Chief Complaint   Patient presents with    Follow-up    Chronic Kidney Disease     ASSESSMENT and PLAN:  CKD stage III, baseline serum creatinine 1 3-1 5  -creatinine 1 2 in June 2021   -  UA in February 2021 shows no significant hematuria or proteinuria  -CKD likely secondary to long-term hypertension causing hypertensive nephrosclerosis, morbid obesity   -Avoid nephrotoxins or NSAIDs     -will do repeat UPC ratio, BMP before next visit    Proteinuria, last urine microalbumin/creatinine ratio 36 mg in June 2021  Continue to monitor   - repeat urine microalbumin/creatinine ratio before next visit  -Could be secondary to underlying hypertension +/- obesity related secondary FSGS component   -May Consider ACE inhibitor eventually if renal function remains stable and proteinuria worsening      History of gross hematuria  -Denies any recent episodes of gross hematuria   Follow up with Urology     Hypertension  -  BP well controlled in the office today  - Salt restricted diet   - continue felodipine   Lasix as below     Leg edema,   -  weight seems to be stable around 280 lb since last visit  -  Remains on stable dose of Lasix 40 mg in a m  20 mg in p m  Larayne Chroman -continue to monitor daily weight and call back if he has weight gain greater than 5 lb in 2 to 3 days    -echocardiogram in September 2020 shows EF 55 percent, moderate to severe aortic stenosis, moderate pulmonary hypertension      Secondary hyperparathyroidism,  last PTH 33 in June 2021    Vitamin-D insufficiency,   vitamin-D level has improved 42 6 in June 2021  Currently on 1000 units daily    Repeat level before next visit     Hyperuricemia with history of gout  -serum uric acid slightly elevated 8 3 in June 2021, On allopurinol 200 mg daily       Morbid obesity, he has been losing weight as mentioned above     CAD, status post PTCA in April 2019 and again in May 2019      Diagnoses and all orders for this visit:    Stage 3b chronic kidney disease ([facility] )  -     Basic metabolic panel; Future  -     CBC; Future  -     Phosphorus; Future  -     PTH, intact; Future  -     Magnesium; Future  -     Vitamin D 25 hydroxy; Future  -     Uric acid; Future    Benign hypertension with chronic kidney disease, stage III (HCC)  -     CBC; Future    Persistent proteinuria  -     Microalbumin / creatinine urine ratio; Future    Vitamin D insufficiency  -     Vitamin D 25 hydroxy; Future    Bilateral leg edema    Hyperuricemia  -     Uric acid; Future    Secondary hyperparathyroidism of renal origin ([facility] )  -     Phosphorus; Future  -     PTH, intact; Future  -     Vitamin D 25 hydroxy; Future    Other orders  -     cholecalciferol (VITAMIN D3) 1,000 units tablet; Take 1,000 Units by mouth daily        SUBJECTIVE / HPI:  Patient is 80year-old male with significant medical issues of hypertension for many years, CAD, status post PTCA, status post pacemaker placement, morbidly obese, CKD stage III with baseline creatinine 1 3-1 5, gout, AFib, JOVI on BiPAP regular follow-up of CKD  Since last visit, weight seems to be overall stable around 280 lb  Overall doing well  Remains very active  Remains on stable dose of Lasix as above     He denies any urinary complaint at this time   His leg edema is overall stable  No recent NSAID exposure  REVIEW OF SYSTEMS:  More than 10 point review of systems were obtained and discussed in length with the patient  Complete review of systems were negative / unremarkable except mentioned above  PHYSICAL EXAM:  Vitals:    07/01/21 0922 07/01/21 1007   BP: 110/52 118/64   BP Location: Left arm    Patient Position: Sitting    Cuff Size: Large    Pulse: 70    Weight: 128 kg (282 lb 12 8 oz)    Height: 6' 1" (1 854 m)      Body mass index is 37 31 kg/m²  Physical Exam  Vitals reviewed     Constitutional:       Appearance: He is well-developed  HENT:      Head: Normocephalic and atraumatic  Right Ear: External ear normal       Left Ear: External ear normal       Nose: Nose normal    Eyes:      General: No scleral icterus  Conjunctiva/sclera: Conjunctivae normal    Cardiovascular:      Comments: S1, S2 present  Pulmonary:      Effort: Pulmonary effort is normal  No respiratory distress  Breath sounds: Normal breath sounds  No wheezing or rales  Abdominal:      General: Bowel sounds are normal  There is no distension  Palpations: Abdomen is soft  Tenderness: There is no abdominal tenderness  Musculoskeletal:         General: No tenderness  Right lower leg: No edema  Left lower leg: No edema  Lymphadenopathy:      Cervical: No cervical adenopathy  Skin:     Findings: No rash  Neurological:      Mental Status: He is alert and oriented to person, place, and time     Psychiatric:         Behavior: Behavior normal          PAST MEDICAL HISTORY:  Past Medical History:   Diagnosis Date    Arthritis     Atrial flutter ([facility] )     Chronic kidney disease     stage 3    Coronary artery disease     2 stents    Fluid retention     Gout     Heart failure (HCC)     pacemaker    Hypertension     Pacemaker     Pulmonary emphysema ([facility] )     Radiculopathy     last assessed 1/28/16     Shortness of breath     exertion    Sleep apnea     c pap       PAST SURGICAL HISTORY:  Past Surgical History:   Procedure Laterality Date    ANGIOPLASTY      x2 2 stents and then replaced   [address]      pacemaker permanent placement dual chamber / last assessed 4/7/14 / implantation     CARDIAC SURGERY      pacemaker    CHOLECYSTECTOMY      CORONARY ANGIOPLASTY WITH STENT PLACEMENT      EPIDURAL BLOCK INJECTION N/A 5/26/2016    Procedure: BLOCK / INJECTION EPIDURAL STEROID LUMBAR  L4-5;  Surgeon: Devon Vo MD;  Location: [facility] MAIN OR;  Service:     EPIDURAL BLOCK INJECTION N/A 2/14/2019 Procedure: L4 L5 Lumbar Epidural Steroid Injection;  Surgeon: Favian Eubanks MD;  Location: [place] [address] MAIN OR;  Service: Pain Management     EYE SURGERY      cataract left    KNEE ARTHROSCOPY W/ MENISCAL REPAIR Left     LUMBAR EPIDURAL INJECTION N/A 3/17/2016    Procedure: BLOCK / INJECTION LUMBAR  L4-5  (C-ARM); Surgeon: Favian Eubanks MD;  Location: [facility] MAIN OR;  Service:        SOCIAL HISTORY:  Social History     Substance and Sexual Activity   Alcohol Use Never     Social History     Substance and Sexual Activity   Drug Use Never     Social History     Tobacco Use   Smoking Status Former Smoker    Packs/day: 1 00    Years: 50 00    Pack years: 50 00    Types: Cigarettes    Quit date: 3/27/2019    Years since quittin 2   Smokeless Tobacco Never Used   Tobacco Comment    quit 2021       FAMILY HISTORY:  Family History   Problem Relation Age of Onset    Cancer Mother 80    Heart disease Mother     Hypertension Mother     Heart disease Father     Diabetes Neg Hx     Stroke Neg Hx        MEDICATIONS:    Current Outpatient Medications:     albuterol (Ventolin HFA) 90 mcg/act inhaler, Inhale 2 puffs every 6 (six) hours as needed for wheezing, Disp: 3 Inhaler, Rfl: 3    allopurinol (ZYLOPRIM) 100 mg tablet, Take 2 tablets (200 mg total) by mouth daily, Disp: 180 tablet, Rfl: 3    ascorbic acid (VITAMIN C) 500 mg tablet, Take 500 mg by mouth daily  , Disp: , Rfl:     B Complex Vitamins (B COMPLEX 1 PO), Take 1 tablet by mouth daily  , Disp: , Rfl:     Biotin (BIOTIN 5000) 5 MG CAPS, Take 1,000 mcg by mouth daily  , Disp: , Rfl:     calcium carbonate-vitamin D (OSCAL-D) 500 mg-200 units per tablet, Take 2 tablets by mouth daily at bedtime  , Disp: , Rfl:     cholecalciferol (VITAMIN D3) 1,000 units tablet, Take 1,000 Units by mouth daily, Disp: , Rfl:     clopidogrel (PLAVIX) 75 mg tablet, Take 1 tablet (75 mg total) by mouth daily, Disp: 90 tablet, Rfl: 1    colchicine (COLCRYS) 0 6 mg tablet, Take 1 tablet (0 6 mg total) by mouth 2 (two) times a day Take twice daily x 7 days as needed for gout, Disp: 30 tablet, Rfl: 5    Cranberry 1000 MG CAPS, Take 1 tablet by mouth 2 (two) times a day , Disp: , Rfl:     cyanocobalamin 1000 MCG tablet, Take 100 mcg by mouth daily, Disp: , Rfl:     dextromethorphan-guaiFENesin (ROBITUSSIN DM)  mg/5 mL syrup, Take 10 mL by mouth every 4 (four) hours as needed for cough, Disp: 118 mL, Rfl: 0    felodipine (PLENDIL) 5 mg 24 hr tablet, TAKE 1 TABLET DAILY, Disp: 90 tablet, Rfl: 1    finasteride (PROSCAR) 5 mg tablet, Take 1 tablet (5 mg total) by mouth daily, Disp: 90 tablet, Rfl: 3    Flaxseed, Linseed, (EQL FLAX SEED OIL) 1000 MG CAPS, Take by mouth daily  , Disp: , Rfl:     fluocinonide (LIDEX) 0 05 % cream, Apply topically 2 (two) times a day Prn leg rash, short term (Patient taking differently: Apply topically as needed Prn leg rash, short term), Disp: 120 g, Rfl: 2    fluticasone-umeclidinium-vilanterol (Trelegy Ellipta) 100-62 5-25 MCG/INH inhaler, Inhale 1 puff daily Rinse mouth after use , Disp: , Rfl:     furosemide (LASIX) 20 mg tablet, TAKE 2 TABLETS (40 MG) IN AM AND 1 TABLET (20 MG) IN PM, Disp: 270 tablet, Rfl: 3    glucosamine-chondroitin 500-400 MG tablet, Take 1 tablet by mouth 2 (two) times a day , Disp: , Rfl:     lactase (LACTAID) 3,000 units tablet, Take 3,000 Units by mouth as needed  , Disp: , Rfl:     Omega-3 Fatty Acids (FISH OIL) 1,000 mg, Take 1,000 mg by mouth 2 (two) times a day , Disp: , Rfl:     PARoxetine (PAXIL) 20 mg tablet, Take 1 tablet (20 mg total) by mouth daily, Disp: 90 tablet, Rfl: 1    rivaroxaban (XARELTO) 15 mg tablet, Take 1 tablet (15 mg total) by mouth daily with breakfast (Patient taking differently: Take 20 mg by mouth daily at bedtime ), Disp: , Rfl:     simvastatin (ZOCOR) 20 mg tablet, TAKE 1 TABLET DAILY AT     BEDTIME, Disp: 90 tablet, Rfl: 1    Lab Results:   Creatinine 1 2

## 2021-07-06 ENCOUNTER — OFFICE VISIT (OUTPATIENT)
Dept: FAMILY MEDICINE CLINIC | Facility: CLINIC | Age: 81
End: 2021-07-06
Payer: MEDICARE

## 2021-07-06 VITALS
BODY MASS INDEX: 37.96 KG/M2 | WEIGHT: 286.4 LBS | HEART RATE: 77 BPM | DIASTOLIC BLOOD PRESSURE: 54 MMHG | SYSTOLIC BLOOD PRESSURE: 140 MMHG | RESPIRATION RATE: 20 BRPM | OXYGEN SATURATION: 98 % | TEMPERATURE: 98.2 F | HEIGHT: 73 IN

## 2021-07-06 DIAGNOSIS — M54.2 NECK PAIN ON RIGHT SIDE: Primary | ICD-10-CM

## 2021-07-06 PROCEDURE — 99213 OFFICE O/P EST LOW 20 MIN: CPT | Performed by: FAMILY MEDICINE

## 2021-07-06 PROCEDURE — 96372 THER/PROPH/DIAG INJ SC/IM: CPT

## 2021-07-06 RX ORDER — METHYLPREDNISOLONE ACETATE 80 MG/ML
80 INJECTION, SUSPENSION INTRA-ARTICULAR; INTRALESIONAL; INTRAMUSCULAR; SOFT TISSUE ONCE
Status: COMPLETED | OUTPATIENT
Start: 2021-07-06 | End: 2021-07-06

## 2021-07-06 RX ORDER — BACLOFEN 10 MG/1
10 TABLET ORAL 3 TIMES DAILY PRN
Qty: 20 TABLET | Refills: 0 | Status: SHIPPED | OUTPATIENT
Start: 2021-07-06 | End: 2021-08-17

## 2021-07-06 RX ADMIN — METHYLPREDNISOLONE ACETATE 80 MG: 80 INJECTION, SUSPENSION INTRA-ARTICULAR; INTRALESIONAL; INTRAMUSCULAR; SOFT TISSUE at 14:43

## 2021-07-06 NOTE — PROGRESS NOTES
Assessment/Plan:    1  Neck pain on right side  Comments:  New  Orders:  -     methylPREDNISolone acetate (DEPO-MEDROL) injection 80 mg  -     baclofen 10 mg tablet; Take 1 tablet (10 mg total) by mouth 3 (three) times a day as needed for muscle spasms            There are no Patient Instructions on file for this visit  Return if symptoms worsen or fail to improve  Subjective:      Patient ID: Tracy Baltazar is a 80 y o  male  Chief Complaint   Patient presents with    Neck Pain     jmcma       He had sudden pain in his neck yesterday  The pain is worse with turning his head to the right  He denies any trauma  The following portions of the patient's history were reviewed and updated as appropriate: allergies, current medications, past family history, past medical history, past social history, past surgical history and problem list     Review of Systems      Current Outpatient Medications   Medication Sig Dispense Refill    albuterol (Ventolin HFA) 90 mcg/act inhaler Inhale 2 puffs every 6 (six) hours as needed for wheezing 3 Inhaler 3    allopurinol (ZYLOPRIM) 100 mg tablet Take 2 tablets (200 mg total) by mouth daily 180 tablet 3    ascorbic acid (VITAMIN C) 500 mg tablet Take 500 mg by mouth daily   B Complex Vitamins (B COMPLEX 1 PO) Take 1 tablet by mouth daily   Biotin (BIOTIN 5000) 5 MG CAPS Take 1,000 mcg by mouth daily   calcium carbonate-vitamin D (OSCAL-D) 500 mg-200 units per tablet Take 2 tablets by mouth daily at bedtime   cholecalciferol (VITAMIN D3) 1,000 units tablet Take 1,000 Units by mouth daily      clopidogrel (PLAVIX) 75 mg tablet Take 1 tablet (75 mg total) by mouth daily 90 tablet 1    colchicine (COLCRYS) 0 6 mg tablet Take 1 tablet (0 6 mg total) by mouth 2 (two) times a day Take twice daily x 7 days as needed for gout 30 tablet 5    Cranberry 1000 MG CAPS Take 1 tablet by mouth 2 (two) times a day        cyanocobalamin 1000 MCG tablet Take 100 mcg by mouth daily      dextromethorphan-guaiFENesin (ROBITUSSIN DM)  mg/5 mL syrup Take 10 mL by mouth every 4 (four) hours as needed for cough 118 mL 0    felodipine (PLENDIL) 5 mg 24 hr tablet TAKE 1 TABLET DAILY 90 tablet 1    finasteride (PROSCAR) 5 mg tablet Take 1 tablet (5 mg total) by mouth daily 90 tablet 3    Flaxseed, Linseed, (EQL FLAX SEED OIL) 1000 MG CAPS Take by mouth daily   fluocinonide (LIDEX) 0 05 % cream Apply topically 2 (two) times a day Prn leg rash, short term (Patient taking differently: Apply topically as needed Prn leg rash, short term) 120 g 2    fluticasone-umeclidinium-vilanterol (Trelegy Ellipta) 100-62 5-25 MCG/INH inhaler Inhale 1 puff daily Rinse mouth after use   furosemide (LASIX) 20 mg tablet TAKE 2 TABLETS (40 MG) IN AM AND 1 TABLET (20 MG) IN  tablet 3    glucosamine-chondroitin 500-400 MG tablet Take 1 tablet by mouth 2 (two) times a day   lactase (LACTAID) 3,000 units tablet Take 3,000 Units by mouth as needed   Omega-3 Fatty Acids (FISH OIL) 1,000 mg Take 1,000 mg by mouth 2 (two) times a day   PARoxetine (PAXIL) 20 mg tablet Take 1 tablet (20 mg total) by mouth daily 90 tablet 1    rivaroxaban (XARELTO) 15 mg tablet Take 1 tablet (15 mg total) by mouth daily with breakfast (Patient taking differently: Take 20 mg by mouth daily at bedtime )      simvastatin (ZOCOR) 20 mg tablet TAKE 1 TABLET DAILY AT     BEDTIME 90 tablet 1    baclofen 10 mg tablet Take 1 tablet (10 mg total) by mouth 3 (three) times a day as needed for muscle spasms 20 tablet 0     No current facility-administered medications for this visit  Objective:    /54   Pulse 77   Temp 98 2 °F (36 8 °C)   Resp 20   Ht 6' 1" (1 854 m)   Wt 130 kg (286 lb 6 4 oz)   SpO2 98%   BMI 37 79 kg/m²        Physical Exam  Vitals and nursing note reviewed  Constitutional:       Appearance: He is well-developed  HENT:      Head: Normocephalic and atraumatic  Right Ear: Tympanic membrane and external ear normal       Left Ear: Tympanic membrane and external ear normal    Cardiovascular:      Rate and Rhythm: Normal rate and regular rhythm  Heart sounds: Murmur heard  Pulmonary:      Effort: Pulmonary effort is normal  No respiratory distress  Breath sounds: Normal breath sounds  No wheezing or rales  Musculoskeletal:         General: Tenderness (Right paracervical, increased with right rotation) present  Right lower leg: No edema  Left lower leg: No edema                  Veda Bolton, DO

## 2021-07-09 ENCOUNTER — HOSPITAL ENCOUNTER (OUTPATIENT)
Dept: RADIOLOGY | Facility: HOSPITAL | Age: 81
Discharge: HOME | End: 2021-07-09
Payer: MEDICARE

## 2021-07-09 DIAGNOSIS — R93.89 ABNORMAL CHEST CT: ICD-10-CM

## 2021-07-09 PROCEDURE — 71250 CT THORAX DX C-: CPT

## 2021-07-09 PROCEDURE — G1004 CDSM NDSC: HCPCS

## 2021-08-04 ENCOUNTER — TELEPHONE (OUTPATIENT)
Dept: PULMONOLOGY | Facility: MEDICAL CENTER | Age: 81
End: 2021-08-04

## 2021-08-04 NOTE — RESULT ENCOUNTER NOTE
I discussed the results of the following tests with the patient in entirety  All questions were answered  Follow up tests to be ordered at next visit

## 2021-08-04 NOTE — TELEPHONE ENCOUNTER
Attempted to call patient and unable to reach via telephone  Left voicemail for the patient to return phone call to the office to discuss test results          CT Results

## 2021-08-14 PROBLEM — I70.209 ATHEROSCLEROSIS OF ARTERIES OF EXTREMITIES (HCC): Status: RESOLVED | Noted: 2018-01-31 | Resolved: 2021-08-14

## 2021-08-17 ENCOUNTER — OFFICE VISIT (OUTPATIENT)
Dept: FAMILY MEDICINE CLINIC | Facility: CLINIC | Age: 81
End: 2021-08-17
Payer: MEDICARE

## 2021-08-17 VITALS
DIASTOLIC BLOOD PRESSURE: 48 MMHG | HEIGHT: 73 IN | TEMPERATURE: 96.9 F | BODY MASS INDEX: 37.11 KG/M2 | HEART RATE: 70 BPM | SYSTOLIC BLOOD PRESSURE: 102 MMHG | WEIGHT: 280 LBS | OXYGEN SATURATION: 96 % | RESPIRATION RATE: 20 BRPM

## 2021-08-17 DIAGNOSIS — I10 ESSENTIAL HYPERTENSION: ICD-10-CM

## 2021-08-17 DIAGNOSIS — F41.1 GAD (GENERALIZED ANXIETY DISORDER): ICD-10-CM

## 2021-08-17 DIAGNOSIS — M54.16 BILATERAL LUMBAR RADICULOPATHY: ICD-10-CM

## 2021-08-17 DIAGNOSIS — N18.30 BENIGN HYPERTENSION WITH CHRONIC KIDNEY DISEASE, STAGE III (HCC): ICD-10-CM

## 2021-08-17 DIAGNOSIS — G62.9 NEUROPATHY: Primary | ICD-10-CM

## 2021-08-17 DIAGNOSIS — I25.10 CORONARY ARTERY DISEASE INVOLVING NATIVE CORONARY ARTERY OF NATIVE HEART WITHOUT ANGINA PECTORIS: Chronic | ICD-10-CM

## 2021-08-17 DIAGNOSIS — E79.0 HYPERURICEMIA: ICD-10-CM

## 2021-08-17 DIAGNOSIS — I12.9 BENIGN HYPERTENSION WITH CHRONIC KIDNEY DISEASE, STAGE III (HCC): ICD-10-CM

## 2021-08-17 DIAGNOSIS — I25.10 CORONARY ARTERY ARTERIOSCLEROSIS: ICD-10-CM

## 2021-08-17 PROCEDURE — 99214 OFFICE O/P EST MOD 30 MIN: CPT | Performed by: FAMILY MEDICINE

## 2021-08-17 RX ORDER — FELODIPINE 5 MG/1
5 TABLET, EXTENDED RELEASE ORAL DAILY
Qty: 90 TABLET | Refills: 1 | Status: SHIPPED | OUTPATIENT
Start: 2021-08-17 | End: 2021-12-13 | Stop reason: SDUPTHER

## 2021-08-17 RX ORDER — CLOPIDOGREL BISULFATE 75 MG/1
75 TABLET ORAL DAILY
Qty: 90 TABLET | Refills: 1 | Status: SHIPPED | OUTPATIENT
Start: 2021-08-17 | End: 2021-12-13 | Stop reason: SDUPTHER

## 2021-08-17 RX ORDER — SIMVASTATIN 20 MG
20 TABLET ORAL
Qty: 90 TABLET | Refills: 1 | Status: SHIPPED | OUTPATIENT
Start: 2021-08-17 | End: 2021-12-13 | Stop reason: SDUPTHER

## 2021-08-17 RX ORDER — DULOXETIN HYDROCHLORIDE 30 MG/1
30 CAPSULE, DELAYED RELEASE ORAL DAILY
Qty: 90 CAPSULE | Refills: 1 | Status: SHIPPED | OUTPATIENT
Start: 2021-08-17 | End: 2021-09-17 | Stop reason: SDUPTHER

## 2021-08-17 NOTE — PATIENT INSTRUCTIONS
We can change the paroxetine 20mg per day that you use for mood to duloxetine 30mg daily in the am, which is useful for mood, energy, arthritis pain, and neuropathy

## 2021-08-17 NOTE — PROGRESS NOTES
Assessment/Plan:    No problem-specific Assessment & Plan notes found for this encounter  DJD back with possible radiculopathy vs neuropathy  Offer neuro eval for testing but they report insurance did not approve in the past    Agreeable to change paxil to cymbalta and f/u 1m, to help with mood/neuropathy/arthritis pain    htn stable, continue meds    plavix and xarelto together per cardiology    Cont allopurinol for uric acid control     Diagnoses and all orders for this visit:    Neuropathy  -     DULoxetine (CYMBALTA) 30 mg delayed release capsule; Take 1 capsule (30 mg total) by mouth daily    Bilateral lumbar radiculopathy    Benign hypertension with chronic kidney disease, stage III (HCC)    Hyperuricemia    Coronary artery disease involving native coronary artery of native heart without angina pectoris    Essential hypertension  -     felodipine (PLENDIL) 5 mg 24 hr tablet; Take 1 tablet (5 mg total) by mouth daily    Coronary artery arteriosclerosis  -     simvastatin (ZOCOR) 20 mg tablet; Take 1 tablet (20 mg total) by mouth daily at bedtime  -     clopidogrel (PLAVIX) 75 mg tablet; Take 1 tablet (75 mg total) by mouth daily    JOO (generalized anxiety disorder)  -     DULoxetine (CYMBALTA) 30 mg delayed release capsule; Take 1 capsule (30 mg total) by mouth daily        Return in about 1 month (around 9/17/2021) for Recheck  Subjective:      Patient ID: Yuan Landis is a 80 y o  male      Chief Complaint   Patient presents with    Follow-up     6 month   sas/cma       HPI  Years, feet, did see Dr Mercy Barber  Given gabapentin, stopped after a while because made him whoozy and did not help  Had PT and acupuncture  Feels back is better    Has hans with cpap  Gets drowsy usually per pt, sees Dr Rosario Haynes helps mood but running out    The following portions of the patient's history were reviewed and updated as appropriate: allergies, current medications, past family history, past medical history, past social history, past surgical history and problem list     Review of Systems   Constitutional: Negative for fever  Respiratory: Negative for shortness of breath  Current Outpatient Medications   Medication Sig Dispense Refill    albuterol (Ventolin HFA) 90 mcg/act inhaler Inhale 2 puffs every 6 (six) hours as needed for wheezing 3 Inhaler 3    allopurinol (ZYLOPRIM) 100 mg tablet Take 2 tablets (200 mg total) by mouth daily 180 tablet 3    ascorbic acid (VITAMIN C) 500 mg tablet Take 500 mg by mouth daily   B Complex Vitamins (B COMPLEX 1 PO) Take 1 tablet by mouth daily   Biotin (BIOTIN 5000) 5 MG CAPS Take 1,000 mcg by mouth daily   calcium carbonate-vitamin D (OSCAL-D) 500 mg-200 units per tablet Take 2 tablets by mouth daily at bedtime   cholecalciferol (VITAMIN D3) 1,000 units tablet Take 1,000 Units by mouth daily      clopidogrel (PLAVIX) 75 mg tablet Take 1 tablet (75 mg total) by mouth daily 90 tablet 1    colchicine (COLCRYS) 0 6 mg tablet Take 1 tablet (0 6 mg total) by mouth 2 (two) times a day Take twice daily x 7 days as needed for gout 30 tablet 5    Cranberry 1000 MG CAPS Take 1 tablet by mouth 2 (two) times a day   cyanocobalamin 1000 MCG tablet Take 100 mcg by mouth daily      dextromethorphan-guaiFENesin (ROBITUSSIN DM)  mg/5 mL syrup Take 10 mL by mouth every 4 (four) hours as needed for cough 118 mL 0    felodipine (PLENDIL) 5 mg 24 hr tablet Take 1 tablet (5 mg total) by mouth daily 90 tablet 1    finasteride (PROSCAR) 5 mg tablet Take 1 tablet (5 mg total) by mouth daily 90 tablet 3    Flaxseed, Linseed, (EQL FLAX SEED OIL) 1000 MG CAPS Take by mouth daily        fluocinonide (LIDEX) 0 05 % cream Apply topically 2 (two) times a day Prn leg rash, short term (Patient taking differently: Apply topically as needed Prn leg rash, short term) 120 g 2    fluticasone-umeclidinium-vilanterol (Trelegy Ellipta) 100-62 5-25 MCG/INH inhaler Inhale 1 puff daily Rinse mouth after use   furosemide (LASIX) 20 mg tablet TAKE 2 TABLETS (40 MG) IN AM AND 1 TABLET (20 MG) IN  tablet 3    glucosamine-chondroitin 500-400 MG tablet Take 1 tablet by mouth 2 (two) times a day   lactase (LACTAID) 3,000 units tablet Take 3,000 Units by mouth as needed   rivaroxaban (XARELTO) 15 mg tablet Take 1 tablet (15 mg total) by mouth daily with breakfast (Patient taking differently: Take 20 mg by mouth daily at bedtime )      simvastatin (ZOCOR) 20 mg tablet Take 1 tablet (20 mg total) by mouth daily at bedtime 90 tablet 1    DULoxetine (CYMBALTA) 30 mg delayed release capsule Take 1 capsule (30 mg total) by mouth daily 90 capsule 1    Omega-3 Fatty Acids (FISH OIL) 1,000 mg Take 1,000 mg by mouth 2 (two) times a day  (Patient not taking: Reported on 8/17/2021)       No current facility-administered medications for this visit  Objective:    BP (!) 102/48   Pulse 70   Temp (!) 96 9 °F (36 1 °C)   Resp 20   Ht 6' 1" (1 854 m)   Wt 127 kg (280 lb)   SpO2 96%   BMI 36 94 kg/m²        Physical Exam  Vitals and nursing note reviewed  Constitutional:       Appearance: He is well-developed  He is not ill-appearing  HENT:      Head: Normocephalic  Right Ear: Tympanic membrane normal       Left Ear: Tympanic membrane normal    Eyes:      General: No scleral icterus  Conjunctiva/sclera: Conjunctivae normal    Cardiovascular:      Rate and Rhythm: Normal rate and regular rhythm  Pulmonary:      Effort: Pulmonary effort is normal  No respiratory distress  Breath sounds: No rales  Abdominal:      Palpations: Abdomen is soft  Tenderness: There is no abdominal tenderness  Musculoskeletal:         General: No deformity  Cervical back: Neck supple  Right lower leg: Edema present  Left lower leg: Edema present  Skin:     General: Skin is warm and dry  Coloration: Skin is not pale     Neurological:      Mental Status: He is alert       Gait: Gait normal    Psychiatric:         Mood and Affect: Mood normal          Behavior: Behavior normal          Thought Content:  Thought content normal                 [place] Bras, DO

## 2021-08-19 ENCOUNTER — TELEPHONE (OUTPATIENT)
Dept: NEPHROLOGY | Facility: CLINIC | Age: 81
End: 2021-08-19

## 2021-08-19 NOTE — TELEPHONE ENCOUNTER
Patients wife called the office and advised that patients PCP d/c Paxil 20 mg daily and placed him on Duloxetine 30 mg daily   Before he is started on this medication patients wife would like to know if okay to take

## 2021-08-26 ENCOUNTER — OFFICE VISIT (OUTPATIENT)
Dept: PODIATRY | Facility: CLINIC | Age: 81
End: 2021-08-26
Payer: MEDICARE

## 2021-08-26 VITALS
DIASTOLIC BLOOD PRESSURE: 79 MMHG | WEIGHT: 280 LBS | HEIGHT: 73 IN | RESPIRATION RATE: 17 BRPM | BODY MASS INDEX: 37.11 KG/M2 | SYSTOLIC BLOOD PRESSURE: 131 MMHG

## 2021-08-26 DIAGNOSIS — M54.16 RADICULOPATHY OF LUMBAR REGION: ICD-10-CM

## 2021-08-26 DIAGNOSIS — B35.1 ONYCHOMYCOSIS: ICD-10-CM

## 2021-08-26 DIAGNOSIS — M77.41 METATARSALGIA OF BOTH FEET: Primary | ICD-10-CM

## 2021-08-26 DIAGNOSIS — I70.209 PERIPHERAL ARTERIOSCLEROSIS (HCC): ICD-10-CM

## 2021-08-26 DIAGNOSIS — B35.9 DERMATOPHYTOSIS: ICD-10-CM

## 2021-08-26 DIAGNOSIS — M77.42 METATARSALGIA OF BOTH FEET: Primary | ICD-10-CM

## 2021-08-26 PROCEDURE — 99213 OFFICE O/P EST LOW 20 MIN: CPT | Performed by: PODIATRIST

## 2021-08-26 NOTE — PROGRESS NOTES
Assessment/Plan:  Pain upon ambulation   Limb pain   Rule out radiculopathy   Edema secondary to venous insufficiency   Rule out degenerative disc disease   Probable spinal stenosis   Dermatophytosis   Mycosis of nail      Plan   Patient advised on condition   All nails debrided   Calluses debrided without pain or complication   Patient will follow up with pain management and acupuncture specialists   In addition all abnormal skin debrided without pain or complication       Subjective:  Patient complains of pain in his feet with ambulation   He has pain in his back and legs   He is doing well with acupuncture   He complains of pain with shoe wear   No history of trauma                 Past Medical History:   Diagnosis Date    Arthritis      Atrial flutter (HCC)      Chronic kidney disease       stage 3    Coronary artery disease       2 stents    Fluid retention      Gout      Heart failure (HCC)       pacemaker    Hypertension      Pacemaker      Pulmonary emphysema (HCC)      Radiculopathy       last assessed 1/28/16     Shortness of breath       exertion    Sleep apnea       c pap                   Past Surgical History:   Procedure Laterality Date    ANGIOPLASTY         x2 2 stents and then replaced    CARDIAC PACEMAKER PLACEMENT         pacemaker permanent placement dual chamber / last assessed 4/7/14 / implantation     CARDIAC SURGERY         pacemaker    CHOLECYSTECTOMY        CORONARY ANGIOPLASTY WITH STENT PLACEMENT        EPIDURAL BLOCK INJECTION N/A 5/26/2016     Procedure: BLOCK / INJECTION EPIDURAL STEROID LUMBAR  L4-5;  Surgeon: Favian Eubanks MD;  Location: [place] [address] MAIN OR;  Service:     EYE SURGERY         cataract left    KNEE ARTHROSCOPY W/ MENISCAL REPAIR Left      LUMBAR EPIDURAL INJECTION N/A 3/17/2016     Procedure: BLOCK / INJECTION LUMBAR  L4-5  (C-[facility]);  Surgeon: Favian Eubanks MD;  Location: [place] [address] MAIN OR;  Service:          No Known Allergies        Current Outpatient Prescriptions:     albuterol (VENTOLIN HFA) 90 mcg/act inhaler, Inhale 2 puffs every 6 (six) hours as needed for wheezing, Disp: 1 Inhaler, Rfl: 6    allopurinol (ZYLOPRIM) 100 mg tablet, Take 1 tablet (100 mg total) by mouth daily, Disp: 90 tablet, Rfl: 3    ascorbic acid (VITAMIN C) 500 mg tablet, Take 500 mg by mouth daily  , Disp: , Rfl:     B Complex Vitamins (B COMPLEX 1 PO), Take 1 tablet by mouth daily  , Disp: , Rfl:     Biotin (BIOTIN 5000) 5 MG CAPS, Take 1,000 mcg by mouth daily  , Disp: , Rfl:     calcium carbonate-vitamin D (OSCAL-D) 500 mg-200 units per tablet, Take 2 tablets by mouth daily at bedtime  , Disp: , Rfl:     ciclopirox (LOPROX) 0 77 % cream, Apply topically 2 (two) times a day for 20 days, Disp: 45 g, Rfl: 2    clopidogrel (PLAVIX) 75 mg tablet, TAKE 1 TABLET DAILY, Disp: 90 tablet, Rfl: 3    collagenase (SANTYL) ointment, Apply 1 application topically daily  , Disp: , Rfl:     Cranberry 1000 MG CAPS, Take 1 tablet by mouth 2 (two) times a day , Disp: , Rfl:     cyanocobalamin 1000 MCG tablet, Take 100 mcg by mouth daily, Disp: , Rfl:     felodipine (PLENDIL) 5 mg 24 hr tablet, TAKE 1 TABLET DAILY, Disp: 90 tablet, Rfl: 3    finasteride (PROSCAR) 5 mg tablet, Take 1 tablet (5 mg total) by mouth daily, Disp: 90 tablet, Rfl: 3    Flaxseed, Linseed, (EQL FLAX SEED OIL) 1000 MG CAPS, Take by mouth daily  , Disp: , Rfl:     fluocinonide (LIDEX) 0 05 % cream, Apply topically 2 (two) times a day for 30 days, Disp: 30 g, Rfl: 2    fluticasone-salmeterol (ADVAIR) 250-50 mcg/dose inhaler, Inhale 1 puff 2 (two) times a day Rinse mouth after use , Disp: 1 Inhaler, Rfl: 6    furosemide (LASIX) 40 mg tablet, TAKE 1 TABLET BY MOUTH DAILY AS NEEDED FOR LEG EDEMA OR DYSNEA, Disp: 30 tablet, Rfl: 0    gabapentin (NEURONTIN) 300 mg capsule, TAKE 1 CAPSULE (300 MG TOTAL) BY MOUTH 2 (TWO) TIMES A DAY FOR 30 DAYS, Disp: 60 capsule, Rfl: 2    gabapentin (NEURONTIN) 300 mg capsule, Take 1 capsule (300 mg total) by mouth 3 (three) times a day for 30 days, Disp: 90 capsule, Rfl: 0    gabapentin (NEURONTIN) 600 MG tablet, Take 1 tablet (600 mg total) by mouth 3 (three) times a day for 30 days, Disp: 60 tablet, Rfl: 0    glucosamine-chondroitin 500-400 MG tablet, Take 1 tablet by mouth 2 (two) times a day , Disp: , Rfl:     hydrOXYzine HCL (ATARAX) 25 mg tablet, Take 1 tablet (25 mg total) by mouth 3 (three) times a day for 3 days, Disp: 9 tablet, Rfl: 0    lactase (LACTAID) 3,000 units tablet, Take 3,000 Units by mouth as needed  , Disp: , Rfl:     methylPREDNISolone 4 MG tablet therapy pack, Use as directed on package, Disp: 21 tablet, Rfl: 0    Omega-3 Fatty Acids (FISH OIL) 1,000 mg, Take 1,000 mg by mouth 2 (two) times a day , Disp: , Rfl:     oxyCODONE-acetaminophen (PERCOCET) 5-325 mg per tablet, 1 tablet to be taken 3 times daily as needed for leg pain, Disp: 30 tablet, Rfl: 0    PARoxetine (PAXIL) 20 mg tablet, TAKE 1/2 TABLET BY MOUTH DAILY, Disp: 30 tablet, Rfl: 0    psyllium (METAMUCIL) 58 6 % powder, Take 1 packet by mouth daily Indications: 1 tsp , Disp: , Rfl:     rivaroxaban (XARELTO) tablet, Take 20 mg by mouth , Disp: , Rfl:     simvastatin (ZOCOR) 20 mg tablet, Take 1 tablet (20 mg total) by mouth daily at bedtime, Disp: 90 tablet, Rfl: 3    traMADol (ULTRAM) 50 mg tablet, 1 tablet to be taken twice daily as needed for foot and leg pain, Disp: 30 tablet, Rfl: 0           Patient Active Problem List   Diagnosis    Chronic pain disorder    Bilateral lumbar radiculopathy    Lumbar canal stenosis    Acquired ankle/foot deformity    Pain in joints of both feet    Dermatophytosis    Atherosclerosis of arteries of extremities (HCC)    Pain in both feet    Onychomycosis    Neck muscle spasm    Anxiety 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    Benign hypertension with CKD (chronic kidney disease) stage III (HCC)    Bilateral leg edema    CKD (chronic kidney disease), stage III (HCC)  Microscopic hematuria    Proteinuria    Urinary frequency    Atrial fibrillation (HCC)    Benign prostatic hyperplasia    Congestive heart failure (HCC)    Arteriosclerosis of coronary artery    Allergic rhinitis    Aneurysm of abdominal aorta (HCC)    Angina pectoris (HCC)    Pain in joint involving ankle and foot    Atrial flutter (HCC)    Carpal tunnel syndrome    Chronic gout without tophus    Chronic low back pain    Chronic obstructive pulmonary disease (HCC)    Colon, diverticulosis    Deep venous insufficiency    Degenerative disc disease, lumbar    Difficulty in walking    Fecal soiling    Fibromyalgia    Fistula-in-ano    Hyperlipidemia    Insomnia, persistent    Memory loss    Moderate or severe vision impairment, one eye    Morbid obesity (HCC)    Nicotine dependence    Osteoarthritis of knee    Pacemaker    Sleep apnea    Urinary incontinence    Venous insufficiency (chronic) (peripheral)    BMI 45 0-49 9, adult (HCC)    Carbuncle of neck    Carbuncle of occipital region of scalp    Elevated glucose    Peripheral arteriosclerosis (HCC)    Radiculopathy of lumbar region    Metatarsalgia of both feet          Physical Exam  Left Foot: Appearance: Normal except as noted: excessive pronation-- and-- pes planus  Second toe deformities include hammer toe  Third toe deformities include hammer toe  Forth toe deformities include hammer toe  Fifth toe deformities include hammer toe  Tenderness: None except the great toe,-- distal first metatarsal,-- distal fifth metatarsal-- and-- insertion of the plantar fascia  Positive Brian sign third left intermetatarsal space  ROM: Full  Motor: Normal   Right Foot: Appearance: Normal except as noted: excessive pronation-- and-- pes planus  Second toe deformities include hammer toe  Third toe deformities include hammer toe  Forth toe deformities include hammer toe  Fifth toe deformities include hammer toe   Evaluation of the great toe nail demonstrates an ingrown nail  Tenderness: None except the insertion of the plantar fascia  Left Ankle: Appearance: Normal except erythema,-- swelling-- and-- swelling laterally  Tenderness: None except the tibiotalar joint  Right Ankle: ROM: Full  Neurological Exam: performed  Deep tendon reflexes: + tinel of right tibial nerve  Vascular Exam: performed Dorsalis pedis pulses were present bilaterally  Posterior tibial pulses were present bilaterally  Elevation Pallor: absent bilaterally  Capillary refill time was less than 1 second bilaterally  Edema: moderate bilaterally-- and-- 4/7 pitting  neg  buddy  Toenails: All of the toenails were elongated,-- hypertrophied,-- discolored,-- ingrown,-- shown to have subungual debris,-- tender-- and-- Mycotic with onychauxis  Hyperkeratosis: present on both first toes,-- present on both first sub metatarsals-- and-- present on both fifth sub metatarsals  Shoe Gear Evaluation: performed ()   Recommendation(s): extra depth diabetic shoes

## 2021-09-17 ENCOUNTER — OFFICE VISIT (OUTPATIENT)
Dept: FAMILY MEDICINE CLINIC | Facility: CLINIC | Age: 81
End: 2021-09-17
Payer: MEDICARE

## 2021-09-17 VITALS
BODY MASS INDEX: 37.91 KG/M2 | WEIGHT: 286 LBS | DIASTOLIC BLOOD PRESSURE: 60 MMHG | SYSTOLIC BLOOD PRESSURE: 122 MMHG | HEIGHT: 73 IN | HEART RATE: 64 BPM | TEMPERATURE: 98.2 F | RESPIRATION RATE: 20 BRPM

## 2021-09-17 DIAGNOSIS — I25.10 CORONARY ARTERY ARTERIOSCLEROSIS: ICD-10-CM

## 2021-09-17 DIAGNOSIS — F41.1 GAD (GENERALIZED ANXIETY DISORDER): Primary | ICD-10-CM

## 2021-09-17 DIAGNOSIS — I25.10 CORONARY ARTERY DISEASE INVOLVING NATIVE CORONARY ARTERY OF NATIVE HEART WITHOUT ANGINA PECTORIS: Chronic | ICD-10-CM

## 2021-09-17 DIAGNOSIS — Z23 NEED FOR INFLUENZA VACCINATION: ICD-10-CM

## 2021-09-17 DIAGNOSIS — G62.9 NEUROPATHY: ICD-10-CM

## 2021-09-17 DIAGNOSIS — I10 ESSENTIAL HYPERTENSION: ICD-10-CM

## 2021-09-17 PROCEDURE — G0008 ADMIN INFLUENZA VIRUS VAC: HCPCS

## 2021-09-17 PROCEDURE — 90662 IIV NO PRSV INCREASED AG IM: CPT

## 2021-09-17 PROCEDURE — 99214 OFFICE O/P EST MOD 30 MIN: CPT | Performed by: FAMILY MEDICINE

## 2021-09-17 RX ORDER — DULOXETIN HYDROCHLORIDE 60 MG/1
60 CAPSULE, DELAYED RELEASE ORAL DAILY
Qty: 90 CAPSULE | Refills: 1 | Status: SHIPPED | OUTPATIENT
Start: 2021-09-17 | End: 2021-12-13 | Stop reason: SDUPTHER

## 2021-09-17 NOTE — PROGRESS NOTES
Assessment/Plan:    No problem-specific Assessment & Plan notes found for this encounter  JOO better but neuropathy same, increase dose and f/u, next consider gabapentin    htn stable  CAD stable on noac and plavix     Diagnoses and all orders for this visit:    JOO (generalized anxiety disorder)  -     DULoxetine (CYMBALTA) 60 mg delayed release capsule; Take 1 capsule (60 mg total) by mouth daily    Need for influenza vaccination  -     influenza vaccine, high-dose, PF 0 7 mL (FLUZONE HIGH-DOSE)    Neuropathy  -     DULoxetine (CYMBALTA) 60 mg delayed release capsule; Take 1 capsule (60 mg total) by mouth daily    Coronary artery disease involving native coronary artery of native heart without angina pectoris    Coronary artery arteriosclerosis    Essential hypertension              Return in about 1 month (around 10/17/2021) for Recheck  Subjective:      Patient ID: Arminda Castelan is a 80 y o  male  Chief Complaint   Patient presents with    Follow-up     medication ac/lpn       HPI    On duloxetine 30mg  Was on paxil  Mood good per pt and wife  Seems better, less agitated  Arthritis about same  Feet still bother him, feels numb, no pain, feels tingling, no burning  Worse to walk but even when elevated    Still on xarelto/plavix    Gabapentin in past but made him light headed, not been on in 2-3 years    Wife thinks he having memory issues    The following portions of the patient's history were reviewed and updated as appropriate: allergies, current medications, past family history, past medical history, past social history, past surgical history and problem list     Review of Systems   Constitutional: Negative for chills and fever           Current Outpatient Medications   Medication Sig Dispense Refill    albuterol (Ventolin HFA) 90 mcg/act inhaler Inhale 2 puffs every 6 (six) hours as needed for wheezing 3 Inhaler 3    allopurinol (ZYLOPRIM) 100 mg tablet Take 2 tablets (200 mg total) by mouth daily 180 tablet 3    ascorbic acid (VITAMIN C) 500 mg tablet Take 500 mg by mouth daily   B Complex Vitamins (B COMPLEX 1 PO) Take 1 tablet by mouth daily   Biotin (BIOTIN 5000) 5 MG CAPS Take 1,000 mcg by mouth daily   calcium carbonate-vitamin D (OSCAL-D) 500 mg-200 units per tablet Take 2 tablets by mouth daily at bedtime   cholecalciferol (VITAMIN D3) 1,000 units tablet Take 1,000 Units by mouth daily      clopidogrel (PLAVIX) 75 mg tablet Take 1 tablet (75 mg total) by mouth daily 90 tablet 1    colchicine (COLCRYS) 0 6 mg tablet Take 1 tablet (0 6 mg total) by mouth 2 (two) times a day Take twice daily x 7 days as needed for gout 30 tablet 5    Cranberry 1000 MG CAPS Take 1 tablet by mouth 2 (two) times a day   cyanocobalamin 1000 MCG tablet Take 100 mcg by mouth daily      dextromethorphan-guaiFENesin (ROBITUSSIN DM)  mg/5 mL syrup Take 10 mL by mouth every 4 (four) hours as needed for cough 118 mL 0    DULoxetine (CYMBALTA) 60 mg delayed release capsule Take 1 capsule (60 mg total) by mouth daily 90 capsule 1    felodipine (PLENDIL) 5 mg 24 hr tablet Take 1 tablet (5 mg total) by mouth daily 90 tablet 1    finasteride (PROSCAR) 5 mg tablet Take 1 tablet (5 mg total) by mouth daily 90 tablet 3    Flaxseed, Linseed, (EQL FLAX SEED OIL) 1000 MG CAPS Take by mouth daily   fluticasone-umeclidinium-vilanterol (Trelegy Ellipta) 100-62 5-25 MCG/INH inhaler Inhale 1 puff daily Rinse mouth after use   furosemide (LASIX) 20 mg tablet TAKE 2 TABLETS (40 MG) IN AM AND 1 TABLET (20 MG) IN  tablet 3    glucosamine-chondroitin 500-400 MG tablet Take 1 tablet by mouth 2 (two) times a day   lactase (LACTAID) 3,000 units tablet Take 3,000 Units by mouth as needed        rivaroxaban (XARELTO) 15 mg tablet Take 1 tablet (15 mg total) by mouth daily with breakfast (Patient taking differently: Take 20 mg by mouth daily at bedtime )      simvastatin (ZOCOR) 20 mg tablet Take 1 tablet (20 mg total) by mouth daily at bedtime 90 tablet 1    fluocinonide (LIDEX) 0 05 % cream Apply topically 2 (two) times a day Prn leg rash, short term (Patient taking differently: Apply topically as needed Prn leg rash, short term) 120 g 2    Omega-3 Fatty Acids (FISH OIL) 1,000 mg Take 1,000 mg by mouth 2 (two) times a day  (Patient not taking: Reported on 8/17/2021)       No current facility-administered medications for this visit  Objective:    /60   Pulse 64   Temp 98 2 °F (36 8 °C)   Resp 20   Ht 6' 1" (1 854 m)   Wt 130 kg (286 lb)   BMI 37 73 kg/m²        Physical Exam  Vitals and nursing note reviewed  Constitutional:       Appearance: He is well-developed  He is obese  He is not ill-appearing  HENT:      Head: Normocephalic  Eyes:      Conjunctiva/sclera: Conjunctivae normal    Cardiovascular:      Rate and Rhythm: Normal rate  Pulmonary:      Effort: Pulmonary effort is normal  No respiratory distress  Breath sounds: No rales  Abdominal:      Palpations: Abdomen is soft  Musculoskeletal:         General: No deformity  Cervical back: Neck supple  Right lower leg: Edema present  Left lower leg: Edema present  Skin:     General: Skin is warm and dry  Coloration: Skin is not pale  Neurological:      Mental Status: He is alert  Motor: No weakness  Gait: Gait normal    Psychiatric:         Behavior: Behavior normal          Thought Content:  Thought content normal                 Madiha Gonzales DO

## 2021-09-17 NOTE — PATIENT INSTRUCTIONS
Please increase the duloxetine from 30mg in the morning to 60mg in the morning    We could consider adding gabapentin in the future for feet discomfort/neuropathy

## 2021-10-15 ENCOUNTER — TELEPHONE (OUTPATIENT)
Dept: PULMONOLOGY | Facility: MEDICAL CENTER | Age: 81
End: 2021-10-15

## 2021-10-15 DIAGNOSIS — J43.2 CENTRILOBULAR EMPHYSEMA (HCC): Primary | ICD-10-CM

## 2021-10-18 ENCOUNTER — OFFICE VISIT (OUTPATIENT)
Dept: FAMILY MEDICINE CLINIC | Facility: CLINIC | Age: 81
End: 2021-10-18
Payer: MEDICARE

## 2021-10-18 VITALS
RESPIRATION RATE: 18 BRPM | SYSTOLIC BLOOD PRESSURE: 124 MMHG | WEIGHT: 288 LBS | DIASTOLIC BLOOD PRESSURE: 68 MMHG | HEART RATE: 72 BPM | TEMPERATURE: 98.5 F | BODY MASS INDEX: 38.17 KG/M2 | HEIGHT: 73 IN

## 2021-10-18 DIAGNOSIS — G47.33 OSA (OBSTRUCTIVE SLEEP APNEA): ICD-10-CM

## 2021-10-18 DIAGNOSIS — I48.20 CHRONIC A-FIB (HCC): ICD-10-CM

## 2021-10-18 DIAGNOSIS — G62.9 NEUROPATHY: Primary | ICD-10-CM

## 2021-10-18 DIAGNOSIS — I10 ESSENTIAL HYPERTENSION: ICD-10-CM

## 2021-10-18 DIAGNOSIS — F41.1 GAD (GENERALIZED ANXIETY DISORDER): ICD-10-CM

## 2021-10-18 DIAGNOSIS — K59.09 OTHER CONSTIPATION: ICD-10-CM

## 2021-10-18 PROCEDURE — 99214 OFFICE O/P EST MOD 30 MIN: CPT | Performed by: FAMILY MEDICINE

## 2021-10-18 RX ORDER — DOCUSATE SODIUM 100 MG/1
300 CAPSULE, LIQUID FILLED ORAL DAILY
Qty: 90 CAPSULE | Refills: 5 | Status: SHIPPED | OUTPATIENT
Start: 2021-10-18 | End: 2022-02-07 | Stop reason: HOSPADM

## 2021-10-18 RX ORDER — GABAPENTIN 100 MG/1
100 CAPSULE ORAL
Qty: 90 CAPSULE | Refills: 1 | Status: SHIPPED | OUTPATIENT
Start: 2021-10-18 | End: 2021-11-18 | Stop reason: SDUPTHER

## 2021-10-29 ENCOUNTER — TELEPHONE (OUTPATIENT)
Dept: NEPHROLOGY | Facility: CLINIC | Age: 81
End: 2021-10-29

## 2021-11-04 ENCOUNTER — OFFICE VISIT (OUTPATIENT)
Dept: PODIATRY | Facility: CLINIC | Age: 81
End: 2021-11-04
Payer: MEDICARE

## 2021-11-04 VITALS — BODY MASS INDEX: 38.17 KG/M2 | HEIGHT: 73 IN | WEIGHT: 288 LBS

## 2021-11-04 DIAGNOSIS — B35.1 ONYCHOMYCOSIS: ICD-10-CM

## 2021-11-04 DIAGNOSIS — M77.42 METATARSALGIA OF BOTH FEET: Primary | ICD-10-CM

## 2021-11-04 DIAGNOSIS — I70.209 ATHEROSCLEROSIS OF ARTERIES OF EXTREMITIES (HCC): ICD-10-CM

## 2021-11-04 DIAGNOSIS — M54.16 RADICULOPATHY OF LUMBAR REGION: ICD-10-CM

## 2021-11-04 DIAGNOSIS — I70.209 PERIPHERAL ARTERIOSCLEROSIS (HCC): ICD-10-CM

## 2021-11-04 DIAGNOSIS — B35.9 DERMATOPHYTOSIS: ICD-10-CM

## 2021-11-04 DIAGNOSIS — M77.41 METATARSALGIA OF BOTH FEET: Primary | ICD-10-CM

## 2021-11-04 PROCEDURE — 99213 OFFICE O/P EST LOW 20 MIN: CPT | Performed by: PODIATRIST

## 2021-11-15 ENCOUNTER — TELEPHONE (OUTPATIENT)
Dept: NEPHROLOGY | Facility: CLINIC | Age: 81
End: 2021-11-15

## 2021-11-18 ENCOUNTER — OFFICE VISIT (OUTPATIENT)
Dept: FAMILY MEDICINE CLINIC | Facility: CLINIC | Age: 81
End: 2021-11-18
Payer: MEDICARE

## 2021-11-18 ENCOUNTER — APPOINTMENT (OUTPATIENT)
Dept: RADIOLOGY | Facility: CLINIC | Age: 81
DRG: 291 | End: 2021-11-18
Payer: MEDICARE

## 2021-11-18 ENCOUNTER — TRANSCRIBE ORDERS (OUTPATIENT)
Dept: URGENT CARE | Facility: CLINIC | Age: 81
End: 2021-11-18

## 2021-11-18 VITALS
BODY MASS INDEX: 39.2 KG/M2 | RESPIRATION RATE: 20 BRPM | HEART RATE: 70 BPM | TEMPERATURE: 98.6 F | DIASTOLIC BLOOD PRESSURE: 72 MMHG | HEIGHT: 73 IN | SYSTOLIC BLOOD PRESSURE: 130 MMHG | WEIGHT: 295.8 LBS

## 2021-11-18 DIAGNOSIS — N18.30 BENIGN HYPERTENSION WITH CHRONIC KIDNEY DISEASE, STAGE III (HCC): ICD-10-CM

## 2021-11-18 DIAGNOSIS — I12.9 BENIGN HYPERTENSION WITH CHRONIC KIDNEY DISEASE, STAGE III (HCC): ICD-10-CM

## 2021-11-18 DIAGNOSIS — R41.3 MEMORY DIFFICULTY: ICD-10-CM

## 2021-11-18 DIAGNOSIS — J43.2 CENTRILOBULAR EMPHYSEMA (HCC): ICD-10-CM

## 2021-11-18 DIAGNOSIS — N20.0 RENAL CALCULUS: Primary | ICD-10-CM

## 2021-11-18 DIAGNOSIS — G62.9 NEUROPATHY: Primary | ICD-10-CM

## 2021-11-18 DIAGNOSIS — I10 ESSENTIAL HYPERTENSION: ICD-10-CM

## 2021-11-18 DIAGNOSIS — K59.09 CHRONIC CONSTIPATION: ICD-10-CM

## 2021-11-18 DIAGNOSIS — E78.2 MIXED HYPERLIPIDEMIA: ICD-10-CM

## 2021-11-18 DIAGNOSIS — E66.9 OBESITY (BMI 30-39.9): ICD-10-CM

## 2021-11-18 DIAGNOSIS — N20.0 RENAL CALCULUS: ICD-10-CM

## 2021-11-18 PROCEDURE — 99214 OFFICE O/P EST MOD 30 MIN: CPT | Performed by: FAMILY MEDICINE

## 2021-11-18 PROCEDURE — 74018 RADEX ABDOMEN 1 VIEW: CPT

## 2021-11-18 RX ORDER — GABAPENTIN 300 MG/1
CAPSULE ORAL
Qty: 270 CAPSULE | Refills: 1 | Status: SHIPPED | OUTPATIENT
Start: 2021-11-18 | End: 2022-02-07 | Stop reason: HOSPADM

## 2021-11-21 ENCOUNTER — APPOINTMENT (EMERGENCY)
Dept: RADIOLOGY | Facility: HOSPITAL | Age: 81
DRG: 291 | End: 2021-11-21
Payer: MEDICARE

## 2021-11-21 ENCOUNTER — HOSPITAL ENCOUNTER (INPATIENT)
Facility: HOSPITAL | Age: 81
LOS: 3 days | Discharge: HOME | DRG: 291 | End: 2021-11-24
Attending: EMERGENCY MEDICINE | Admitting: INTERNAL MEDICINE
Payer: MEDICARE

## 2021-11-21 DIAGNOSIS — I50.9 CHF (CONGESTIVE HEART FAILURE) (HCC): Primary | ICD-10-CM

## 2021-11-21 DIAGNOSIS — R60.0 BILATERAL LEG EDEMA: ICD-10-CM

## 2021-11-21 DIAGNOSIS — I50.33 ACUTE ON CHRONIC DIASTOLIC HEART FAILURE (HCC): ICD-10-CM

## 2021-11-21 DIAGNOSIS — Z72.0 NICOTINE ABUSE: ICD-10-CM

## 2021-11-21 PROBLEM — J44.9 COPD (CHRONIC OBSTRUCTIVE PULMONARY DISEASE) (HCC): Status: ACTIVE | Noted: 2021-11-21

## 2021-11-21 LAB
2HR DELTA HS TROPONIN: 5 NG/L
4HR DELTA HS TROPONIN: 11 NG/L
ALBUMIN SERPL BCP-MCNC: 3 G/DL (ref 3.5–5)
ALP SERPL-CCNC: 68 U/L (ref 46–116)
ALT SERPL W P-5'-P-CCNC: 21 U/L (ref 12–78)
ANION GAP SERPL CALCULATED.3IONS-SCNC: 9 MMOL/L (ref 4–13)
APTT PPP: 34 SECONDS (ref 23–37)
AST SERPL W P-5'-P-CCNC: 20 U/L (ref 5–45)
ATRIAL RATE: 66 BPM
BACTERIA UR QL AUTO: ABNORMAL /HPF
BASOPHILS # BLD AUTO: 0.08 THOUSANDS/ΜL (ref 0–0.1)
BASOPHILS NFR BLD AUTO: 1 % (ref 0–1)
BILIRUB SERPL-MCNC: 0.81 MG/DL (ref 0.2–1)
BILIRUB UR QL STRIP: NEGATIVE
BUN SERPL-MCNC: 31 MG/DL (ref 5–25)
CALCIUM ALBUM COR SERPL-MCNC: 9.9 MG/DL (ref 8.3–10.1)
CALCIUM SERPL-MCNC: 9.1 MG/DL (ref 8.3–10.1)
CARDIAC TROPONIN I PNL SERPL HS: 26 NG/L
CARDIAC TROPONIN I PNL SERPL HS: 31 NG/L
CARDIAC TROPONIN I PNL SERPL HS: 37 NG/L
CHLORIDE SERPL-SCNC: 107 MMOL/L (ref 100–108)
CLARITY UR: CLEAR
CO2 SERPL-SCNC: 29 MMOL/L (ref 21–32)
COLOR UR: YELLOW
CREAT SERPL-MCNC: 1.54 MG/DL (ref 0.6–1.3)
EOSINOPHIL # BLD AUTO: 0.18 THOUSAND/ΜL (ref 0–0.61)
EOSINOPHIL NFR BLD AUTO: 2 % (ref 0–6)
ERYTHROCYTE [DISTWIDTH] IN BLOOD BY AUTOMATED COUNT: 15.9 % (ref 11.6–15.1)
FLUAV RNA RESP QL NAA+PROBE: NEGATIVE
FLUBV RNA RESP QL NAA+PROBE: NEGATIVE
GFR SERPL CREATININE-BSD FRML MDRD: 42 ML/MIN/1.73SQ M
GLUCOSE SERPL-MCNC: 163 MG/DL (ref 65–140)
GLUCOSE UR STRIP-MCNC: NEGATIVE MG/DL
HCT VFR BLD AUTO: 30.2 % (ref 36.5–49.3)
HGB BLD-MCNC: 9.2 G/DL (ref 12–17)
HGB UR QL STRIP.AUTO: ABNORMAL
IMM GRANULOCYTES # BLD AUTO: 0.1 THOUSAND/UL (ref 0–0.2)
IMM GRANULOCYTES NFR BLD AUTO: 1 % (ref 0–2)
INR PPP: 1.57 (ref 0.84–1.19)
KETONES UR STRIP-MCNC: NEGATIVE MG/DL
LEUKOCYTE ESTERASE UR QL STRIP: ABNORMAL
LYMPHOCYTES # BLD AUTO: 0.81 THOUSANDS/ΜL (ref 0.6–4.47)
LYMPHOCYTES NFR BLD AUTO: 8 % (ref 14–44)
MCH RBC QN AUTO: 28.6 PG (ref 26.8–34.3)
MCHC RBC AUTO-ENTMCNC: 30.5 G/DL (ref 31.4–37.4)
MCV RBC AUTO: 94 FL (ref 82–98)
MONOCYTES # BLD AUTO: 0.64 THOUSAND/ΜL (ref 0.17–1.22)
MONOCYTES NFR BLD AUTO: 6 % (ref 4–12)
NEUTROPHILS # BLD AUTO: 8.21 THOUSANDS/ΜL (ref 1.85–7.62)
NEUTS SEG NFR BLD AUTO: 82 % (ref 43–75)
NITRITE UR QL STRIP: NEGATIVE
NON-SQ EPI CELLS URNS QL MICRO: ABNORMAL /HPF
NRBC BLD AUTO-RTO: 0 /100 WBCS
NT-PROBNP SERPL-MCNC: 2424 PG/ML
PH UR STRIP.AUTO: 6.5 [PH]
PLATELET # BLD AUTO: 238 THOUSANDS/UL (ref 149–390)
PMV BLD AUTO: 9.5 FL (ref 8.9–12.7)
POTASSIUM SERPL-SCNC: 4.5 MMOL/L (ref 3.5–5.3)
PROT SERPL-MCNC: 7.4 G/DL (ref 6.4–8.2)
PROT UR STRIP-MCNC: NEGATIVE MG/DL
PROTHROMBIN TIME: 18.4 SECONDS (ref 11.6–14.5)
QRS AXIS: -72 DEGREES
QRSD INTERVAL: 150 MS
QT INTERVAL: 456 MS
QTC INTERVAL: 492 MS
RBC # BLD AUTO: 3.22 MILLION/UL (ref 3.88–5.62)
RBC #/AREA URNS AUTO: ABNORMAL /HPF
RSV RNA RESP QL NAA+PROBE: NEGATIVE
SARS-COV-2 RNA RESP QL NAA+PROBE: NEGATIVE
SODIUM SERPL-SCNC: 145 MMOL/L (ref 136–145)
SP GR UR STRIP.AUTO: 1.01 (ref 1–1.03)
T WAVE AXIS: 102 DEGREES
UROBILINOGEN UR QL STRIP.AUTO: 0.2 E.U./DL
VENTRICULAR RATE: 70 BPM
WBC # BLD AUTO: 10.02 THOUSAND/UL (ref 4.31–10.16)
WBC #/AREA URNS AUTO: ABNORMAL /HPF

## 2021-11-21 PROCEDURE — 71045 X-RAY EXAM CHEST 1 VIEW: CPT

## 2021-11-21 PROCEDURE — 94760 N-INVAS EAR/PLS OXIMETRY 1: CPT

## 2021-11-21 PROCEDURE — 85730 THROMBOPLASTIN TIME PARTIAL: CPT | Performed by: EMERGENCY MEDICINE

## 2021-11-21 PROCEDURE — 84484 ASSAY OF TROPONIN QUANT: CPT | Performed by: EMERGENCY MEDICINE

## 2021-11-21 PROCEDURE — 93010 ELECTROCARDIOGRAM REPORT: CPT | Performed by: INTERNAL MEDICINE

## 2021-11-21 PROCEDURE — 96374 THER/PROPH/DIAG INJ IV PUSH: CPT

## 2021-11-21 PROCEDURE — 99285 EMERGENCY DEPT VISIT HI MDM: CPT

## 2021-11-21 PROCEDURE — 93005 ELECTROCARDIOGRAM TRACING: CPT

## 2021-11-21 PROCEDURE — 80053 COMPREHEN METABOLIC PANEL: CPT | Performed by: EMERGENCY MEDICINE

## 2021-11-21 PROCEDURE — 85025 COMPLETE CBC W/AUTO DIFF WBC: CPT | Performed by: EMERGENCY MEDICINE

## 2021-11-21 PROCEDURE — 36415 COLL VENOUS BLD VENIPUNCTURE: CPT | Performed by: EMERGENCY MEDICINE

## 2021-11-21 PROCEDURE — 94660 CPAP INITIATION&MGMT: CPT

## 2021-11-21 PROCEDURE — 83880 ASSAY OF NATRIURETIC PEPTIDE: CPT | Performed by: EMERGENCY MEDICINE

## 2021-11-21 PROCEDURE — 0241U HB NFCT DS VIR RESP RNA 4 TRGT: CPT | Performed by: NURSE PRACTITIONER

## 2021-11-21 PROCEDURE — 81001 URINALYSIS AUTO W/SCOPE: CPT | Performed by: EMERGENCY MEDICINE

## 2021-11-21 PROCEDURE — 99285 EMERGENCY DEPT VISIT HI MDM: CPT | Performed by: EMERGENCY MEDICINE

## 2021-11-21 PROCEDURE — 99223 1ST HOSP IP/OBS HIGH 75: CPT | Performed by: INTERNAL MEDICINE

## 2021-11-21 PROCEDURE — 85610 PROTHROMBIN TIME: CPT | Performed by: EMERGENCY MEDICINE

## 2021-11-21 PROCEDURE — 94640 AIRWAY INHALATION TREATMENT: CPT

## 2021-11-21 RX ORDER — ACETAMINOPHEN 325 MG/1
650 TABLET ORAL EVERY 6 HOURS PRN
Status: DISCONTINUED | OUTPATIENT
Start: 2021-11-21 | End: 2021-11-24 | Stop reason: HOSPADM

## 2021-11-21 RX ORDER — DOCUSATE SODIUM 100 MG/1
100 CAPSULE, LIQUID FILLED ORAL 2 TIMES DAILY
Status: DISCONTINUED | OUTPATIENT
Start: 2021-11-21 | End: 2021-11-24 | Stop reason: HOSPADM

## 2021-11-21 RX ORDER — BENZONATATE 100 MG/1
200 CAPSULE ORAL 3 TIMES DAILY
Status: DISCONTINUED | OUTPATIENT
Start: 2021-11-21 | End: 2021-11-24 | Stop reason: HOSPADM

## 2021-11-21 RX ORDER — FLUTICASONE FUROATE AND VILANTEROL 200; 25 UG/1; UG/1
1 POWDER RESPIRATORY (INHALATION) DAILY
Status: DISCONTINUED | OUTPATIENT
Start: 2021-11-22 | End: 2021-11-24 | Stop reason: HOSPADM

## 2021-11-21 RX ORDER — B-COMPLEX WITH VITAMIN C
1 TABLET ORAL
Status: DISCONTINUED | OUTPATIENT
Start: 2021-11-22 | End: 2021-11-24 | Stop reason: HOSPADM

## 2021-11-21 RX ORDER — ASCORBIC ACID 500 MG
500 TABLET ORAL DAILY
Status: DISCONTINUED | OUTPATIENT
Start: 2021-11-22 | End: 2021-11-24 | Stop reason: HOSPADM

## 2021-11-21 RX ORDER — FUROSEMIDE 10 MG/ML
40 INJECTION INTRAMUSCULAR; INTRAVENOUS ONCE
Status: COMPLETED | OUTPATIENT
Start: 2021-11-21 | End: 2021-11-21

## 2021-11-21 RX ORDER — PRAVASTATIN SODIUM 40 MG
40 TABLET ORAL
Status: DISCONTINUED | OUTPATIENT
Start: 2021-11-21 | End: 2021-11-24 | Stop reason: HOSPADM

## 2021-11-21 RX ORDER — IPRATROPIUM BROMIDE AND ALBUTEROL SULFATE 2.5; .5 MG/3ML; MG/3ML
3 SOLUTION RESPIRATORY (INHALATION) 3 TIMES DAILY
Status: DISCONTINUED | OUTPATIENT
Start: 2021-11-21 | End: 2021-11-24 | Stop reason: HOSPADM

## 2021-11-21 RX ORDER — ALBUTEROL SULFATE 90 UG/1
2 AEROSOL, METERED RESPIRATORY (INHALATION) EVERY 4 HOURS PRN
Status: DISCONTINUED | OUTPATIENT
Start: 2021-11-21 | End: 2021-11-24 | Stop reason: HOSPADM

## 2021-11-21 RX ORDER — ALLOPURINOL 100 MG/1
200 TABLET ORAL DAILY
Status: DISCONTINUED | OUTPATIENT
Start: 2021-11-22 | End: 2021-11-24 | Stop reason: HOSPADM

## 2021-11-21 RX ORDER — GUAIFENESIN/DEXTROMETHORPHAN 100-10MG/5
10 SYRUP ORAL EVERY 4 HOURS PRN
Status: DISCONTINUED | OUTPATIENT
Start: 2021-11-21 | End: 2021-11-24 | Stop reason: HOSPADM

## 2021-11-21 RX ORDER — GABAPENTIN 300 MG/1
600 CAPSULE ORAL
Status: DISCONTINUED | OUTPATIENT
Start: 2021-11-21 | End: 2021-11-24 | Stop reason: HOSPADM

## 2021-11-21 RX ORDER — FINASTERIDE 5 MG/1
5 TABLET, FILM COATED ORAL
Status: DISCONTINUED | OUTPATIENT
Start: 2021-11-21 | End: 2021-11-24 | Stop reason: HOSPADM

## 2021-11-21 RX ORDER — FUROSEMIDE 10 MG/ML
40 INJECTION INTRAMUSCULAR; INTRAVENOUS
Status: COMPLETED | OUTPATIENT
Start: 2021-11-22 | End: 2021-11-23

## 2021-11-21 RX ORDER — CLOPIDOGREL BISULFATE 75 MG/1
75 TABLET ORAL
Status: DISCONTINUED | OUTPATIENT
Start: 2021-11-21 | End: 2021-11-24 | Stop reason: HOSPADM

## 2021-11-21 RX ORDER — ONDANSETRON 2 MG/ML
4 INJECTION INTRAMUSCULAR; INTRAVENOUS EVERY 6 HOURS PRN
Status: DISCONTINUED | OUTPATIENT
Start: 2021-11-21 | End: 2021-11-24 | Stop reason: HOSPADM

## 2021-11-21 RX ORDER — FELODIPINE 2.5 MG/1
5 TABLET, EXTENDED RELEASE ORAL DAILY
Status: DISCONTINUED | OUTPATIENT
Start: 2021-11-22 | End: 2021-11-24 | Stop reason: HOSPADM

## 2021-11-21 RX ORDER — CALCIUM CARBONATE 200(500)MG
1000 TABLET,CHEWABLE ORAL DAILY PRN
Status: DISCONTINUED | OUTPATIENT
Start: 2021-11-21 | End: 2021-11-24 | Stop reason: HOSPADM

## 2021-11-21 RX ORDER — DULOXETIN HYDROCHLORIDE 60 MG/1
60 CAPSULE, DELAYED RELEASE ORAL DAILY
Status: DISCONTINUED | OUTPATIENT
Start: 2021-11-22 | End: 2021-11-24 | Stop reason: HOSPADM

## 2021-11-21 RX ADMIN — BENZONATATE 200 MG: 100 CAPSULE ORAL at 21:37

## 2021-11-21 RX ADMIN — CLOPIDOGREL BISULFATE 75 MG: 75 TABLET ORAL at 21:38

## 2021-11-21 RX ADMIN — RIVAROXABAN 20 MG: 20 TABLET, FILM COATED ORAL at 18:39

## 2021-11-21 RX ADMIN — FINASTERIDE 5 MG: 5 TABLET, FILM COATED ORAL at 21:38

## 2021-11-21 RX ADMIN — DOCUSATE SODIUM 100 MG: 100 CAPSULE ORAL at 18:39

## 2021-11-21 RX ADMIN — FUROSEMIDE 40 MG: 10 INJECTION, SOLUTION INTRAMUSCULAR; INTRAVENOUS at 14:58

## 2021-11-21 RX ADMIN — GABAPENTIN 600 MG: 300 CAPSULE ORAL at 21:39

## 2021-11-21 RX ADMIN — PRAVASTATIN SODIUM 40 MG: 40 TABLET ORAL at 18:39

## 2021-11-21 RX ADMIN — IPRATROPIUM BROMIDE AND ALBUTEROL SULFATE 3 ML: 2.5; .5 SOLUTION RESPIRATORY (INHALATION) at 20:49

## 2021-11-22 ENCOUNTER — APPOINTMENT (INPATIENT)
Dept: NON INVASIVE DIAGNOSTICS | Facility: HOSPITAL | Age: 81
DRG: 291 | End: 2021-11-22
Payer: MEDICARE

## 2021-11-22 LAB
ALBUMIN SERPL BCP-MCNC: 2.7 G/DL (ref 3.5–5)
ALP SERPL-CCNC: 56 U/L (ref 46–116)
ALT SERPL W P-5'-P-CCNC: 19 U/L (ref 12–78)
ANION GAP SERPL CALCULATED.3IONS-SCNC: 7 MMOL/L (ref 4–13)
AST SERPL W P-5'-P-CCNC: 15 U/L (ref 5–45)
BASOPHILS # BLD AUTO: 0.07 THOUSANDS/ΜL (ref 0–0.1)
BASOPHILS NFR BLD AUTO: 1 % (ref 0–1)
BILIRUB SERPL-MCNC: 1.19 MG/DL (ref 0.2–1)
BUN SERPL-MCNC: 29 MG/DL (ref 5–25)
CALCIUM ALBUM COR SERPL-MCNC: 9.4 MG/DL (ref 8.3–10.1)
CALCIUM SERPL-MCNC: 8.4 MG/DL (ref 8.3–10.1)
CHLORIDE SERPL-SCNC: 107 MMOL/L (ref 100–108)
CO2 SERPL-SCNC: 29 MMOL/L (ref 21–32)
CREAT SERPL-MCNC: 1.54 MG/DL (ref 0.6–1.3)
EOSINOPHIL # BLD AUTO: 0.27 THOUSAND/ΜL (ref 0–0.61)
EOSINOPHIL NFR BLD AUTO: 4 % (ref 0–6)
ERYTHROCYTE [DISTWIDTH] IN BLOOD BY AUTOMATED COUNT: 15.8 % (ref 11.6–15.1)
GFR SERPL CREATININE-BSD FRML MDRD: 42 ML/MIN/1.73SQ M
GLUCOSE SERPL-MCNC: 110 MG/DL (ref 65–140)
HCT VFR BLD AUTO: 27.5 % (ref 36.5–49.3)
HGB BLD-MCNC: 8.4 G/DL (ref 12–17)
IMM GRANULOCYTES # BLD AUTO: 0.07 THOUSAND/UL (ref 0–0.2)
IMM GRANULOCYTES NFR BLD AUTO: 1 % (ref 0–2)
LYMPHOCYTES # BLD AUTO: 1.17 THOUSANDS/ΜL (ref 0.6–4.47)
LYMPHOCYTES NFR BLD AUTO: 15 % (ref 14–44)
MAGNESIUM SERPL-MCNC: 2.5 MG/DL (ref 1.6–2.6)
MCH RBC QN AUTO: 28.6 PG (ref 26.8–34.3)
MCHC RBC AUTO-ENTMCNC: 30.5 G/DL (ref 31.4–37.4)
MCV RBC AUTO: 94 FL (ref 82–98)
MONOCYTES # BLD AUTO: 0.53 THOUSAND/ΜL (ref 0.17–1.22)
MONOCYTES NFR BLD AUTO: 7 % (ref 4–12)
NEUTROPHILS # BLD AUTO: 5.65 THOUSANDS/ΜL (ref 1.85–7.62)
NEUTS SEG NFR BLD AUTO: 72 % (ref 43–75)
NRBC BLD AUTO-RTO: 0 /100 WBCS
PHOSPHATE SERPL-MCNC: 4.2 MG/DL (ref 2.3–4.1)
PLATELET # BLD AUTO: 199 THOUSANDS/UL (ref 149–390)
PMV BLD AUTO: 9.4 FL (ref 8.9–12.7)
POTASSIUM SERPL-SCNC: 3.8 MMOL/L (ref 3.5–5.3)
PROT SERPL-MCNC: 6.5 G/DL (ref 6.4–8.2)
RBC # BLD AUTO: 2.94 MILLION/UL (ref 3.88–5.62)
SODIUM SERPL-SCNC: 143 MMOL/L (ref 136–145)
TSH SERPL DL<=0.05 MIU/L-ACNC: 1.67 UIU/ML (ref 0.36–3.74)
WBC # BLD AUTO: 7.76 THOUSAND/UL (ref 4.31–10.16)

## 2021-11-22 PROCEDURE — 84100 ASSAY OF PHOSPHORUS: CPT | Performed by: NURSE PRACTITIONER

## 2021-11-22 PROCEDURE — 99232 SBSQ HOSP IP/OBS MODERATE 35: CPT | Performed by: NURSE PRACTITIONER

## 2021-11-22 PROCEDURE — 97530 THERAPEUTIC ACTIVITIES: CPT

## 2021-11-22 PROCEDURE — 80053 COMPREHEN METABOLIC PANEL: CPT | Performed by: NURSE PRACTITIONER

## 2021-11-22 PROCEDURE — 94660 CPAP INITIATION&MGMT: CPT

## 2021-11-22 PROCEDURE — 84443 ASSAY THYROID STIM HORMONE: CPT | Performed by: INTERNAL MEDICINE

## 2021-11-22 PROCEDURE — 94640 AIRWAY INHALATION TREATMENT: CPT

## 2021-11-22 PROCEDURE — 94664 DEMO&/EVAL PT USE INHALER: CPT

## 2021-11-22 PROCEDURE — 97162 PT EVAL MOD COMPLEX 30 MIN: CPT

## 2021-11-22 PROCEDURE — 99222 1ST HOSP IP/OBS MODERATE 55: CPT | Performed by: INTERNAL MEDICINE

## 2021-11-22 PROCEDURE — 85025 COMPLETE CBC W/AUTO DIFF WBC: CPT | Performed by: NURSE PRACTITIONER

## 2021-11-22 PROCEDURE — 94760 N-INVAS EAR/PLS OXIMETRY 1: CPT

## 2021-11-22 PROCEDURE — 83735 ASSAY OF MAGNESIUM: CPT | Performed by: NURSE PRACTITIONER

## 2021-11-22 PROCEDURE — C8929 TTE W OR WO FOL WCON,DOPPLER: HCPCS

## 2021-11-22 PROCEDURE — 97166 OT EVAL MOD COMPLEX 45 MIN: CPT

## 2021-11-22 RX ORDER — POTASSIUM CHLORIDE 20 MEQ/1
40 TABLET, EXTENDED RELEASE ORAL ONCE
Status: COMPLETED | OUTPATIENT
Start: 2021-11-22 | End: 2021-11-22

## 2021-11-22 RX ADMIN — FUROSEMIDE 40 MG: 10 INJECTION, SOLUTION INTRAMUSCULAR; INTRAVENOUS at 15:13

## 2021-11-22 RX ADMIN — GABAPENTIN 600 MG: 300 CAPSULE ORAL at 21:59

## 2021-11-22 RX ADMIN — FELODIPINE 5 MG: 2.5 TABLET, FILM COATED, EXTENDED RELEASE ORAL at 08:27

## 2021-11-22 RX ADMIN — POTASSIUM CHLORIDE 40 MEQ: 1500 TABLET, EXTENDED RELEASE ORAL at 08:23

## 2021-11-22 RX ADMIN — Medication 1 TABLET: at 08:24

## 2021-11-22 RX ADMIN — IPRATROPIUM BROMIDE AND ALBUTEROL SULFATE 3 ML: 2.5; .5 SOLUTION RESPIRATORY (INHALATION) at 13:42

## 2021-11-22 RX ADMIN — FUROSEMIDE 40 MG: 10 INJECTION, SOLUTION INTRAMUSCULAR; INTRAVENOUS at 08:24

## 2021-11-22 RX ADMIN — CLOPIDOGREL BISULFATE 75 MG: 75 TABLET ORAL at 21:59

## 2021-11-22 RX ADMIN — DULOXETINE HYDROCHLORIDE 60 MG: 60 CAPSULE, DELAYED RELEASE ORAL at 08:24

## 2021-11-22 RX ADMIN — RIVAROXABAN 20 MG: 20 TABLET, FILM COATED ORAL at 17:37

## 2021-11-22 RX ADMIN — PERFLUTREN 2 ML/MIN: 6.52 INJECTION, SUSPENSION INTRAVENOUS at 10:20

## 2021-11-22 RX ADMIN — ALLOPURINOL 200 MG: 100 TABLET ORAL at 08:24

## 2021-11-22 RX ADMIN — PRAVASTATIN SODIUM 40 MG: 40 TABLET ORAL at 17:37

## 2021-11-22 RX ADMIN — IPRATROPIUM BROMIDE AND ALBUTEROL SULFATE 3 ML: 2.5; .5 SOLUTION RESPIRATORY (INHALATION) at 07:58

## 2021-11-22 RX ADMIN — BENZONATATE 200 MG: 100 CAPSULE ORAL at 08:24

## 2021-11-22 RX ADMIN — FINASTERIDE 5 MG: 5 TABLET, FILM COATED ORAL at 21:59

## 2021-11-22 RX ADMIN — FLUTICASONE FUROATE AND VILANTEROL TRIFENATATE 1 PUFF: 200; 25 POWDER RESPIRATORY (INHALATION) at 08:27

## 2021-11-22 RX ADMIN — IPRATROPIUM BROMIDE AND ALBUTEROL SULFATE 3 ML: 2.5; .5 SOLUTION RESPIRATORY (INHALATION) at 19:49

## 2021-11-22 RX ADMIN — BENZONATATE 200 MG: 100 CAPSULE ORAL at 16:24

## 2021-11-22 RX ADMIN — OXYCODONE HYDROCHLORIDE AND ACETAMINOPHEN 500 MG: 500 TABLET ORAL at 08:24

## 2021-11-22 RX ADMIN — DOCUSATE SODIUM 100 MG: 100 CAPSULE ORAL at 17:39

## 2021-11-22 RX ADMIN — BENZONATATE 200 MG: 100 CAPSULE ORAL at 21:59

## 2021-11-22 RX ADMIN — DOCUSATE SODIUM 100 MG: 100 CAPSULE ORAL at 08:24

## 2021-11-23 LAB
ALBUMIN SERPL BCP-MCNC: 2.7 G/DL (ref 3.5–5)
ALP SERPL-CCNC: 57 U/L (ref 46–116)
ALT SERPL W P-5'-P-CCNC: 17 U/L (ref 12–78)
ANION GAP SERPL CALCULATED.3IONS-SCNC: 6 MMOL/L (ref 4–13)
AORTIC ROOT: 3.3 CM
AORTIC VALVE MEAN VELOCITY: 30.2 M/S
AST SERPL W P-5'-P-CCNC: 15 U/L (ref 5–45)
AV AREA BY CONTINUOUS VTI: 1 CM2
AV AREA PEAK VELOCITY: 1 CM2
AV LVOT MEAN GRADIENT: 3 MMHG
AV LVOT PEAK GRADIENT: 5 MMHG
AV MEAN GRADIENT: 40 MMHG
AV PEAK GRADIENT: 60 MMHG
AV VALVE AREA: 0.97 CM2
BILIRUB SERPL-MCNC: 0.99 MG/DL (ref 0.2–1)
BUN SERPL-MCNC: 32 MG/DL (ref 5–25)
CALCIUM ALBUM COR SERPL-MCNC: 9.7 MG/DL (ref 8.3–10.1)
CALCIUM SERPL-MCNC: 8.7 MG/DL (ref 8.3–10.1)
CHLORIDE SERPL-SCNC: 106 MMOL/L (ref 100–108)
CO2 SERPL-SCNC: 30 MMOL/L (ref 21–32)
CREAT SERPL-MCNC: 1.47 MG/DL (ref 0.6–1.3)
DOP CALC AO VTI: 90.93 CM
DOP CALC LVOT AREA: 3.46 CM2
DOP CALC LVOT DIAMETER: 2.1 CM
DOP CALC LVOT PEAK VEL VTI: 25.42 CM
DOP CALC LVOT PEAK VEL: 1.1 M/S
DOP CALC LVOT STROKE INDEX: 34 ML/M2
DOP CALC LVOT STROKE VOLUME: 88 CM3
DOP CALC MV VTI: 61.91 CM
E WAVE DECELERATION TIME: 307 MS
FRACTIONAL SHORTENING: 31 % (ref 28–44)
GFR SERPL CREATININE-BSD FRML MDRD: 44 ML/MIN/1.73SQ M
GLUCOSE SERPL-MCNC: 112 MG/DL (ref 65–140)
INTERVENTRICULAR SEPTUM IN DIASTOLE (PARASTERNAL SHORT AXIS VIEW): 1.3 CM
LEFT INTERNAL DIMENSION IN SYSTOLE: 4.3 CM (ref 2.1–4)
LEFT VENTRICULAR INTERNAL DIMENSION IN DIASTOLE: 6.2 CM (ref 18.91–28.19)
LEFT VENTRICULAR POSTERIOR WALL IN END DIASTOLE: 1.3 CM
LEFT VENTRICULAR STROKE VOLUME: 109 ML
MAGNESIUM SERPL-MCNC: 2.5 MG/DL (ref 1.6–2.6)
MV E'TISSUE VEL-LAT: 7 CM/S
MV E'TISSUE VEL-SEP: 8 CM/S
MV MEAN GRADIENT: 8 MMHG
MV PEAK E VEL: 264 CM/S
MV PEAK GRADIENT: 26 MMHG
MV STENOSIS PRESSURE HALF TIME: 0 MS
MV VALVE AREA BY CONTINUITY EQUATION: 1.42 CM2
POTASSIUM SERPL-SCNC: 3.9 MMOL/L (ref 3.5–5.3)
PROT SERPL-MCNC: 6.6 G/DL (ref 6.4–8.2)
RV PSP: 57 MMHG
SL CV LV EF: 55
SL CV PED ECHO LEFT VENTRICLE DIASTOLIC VOLUME (MOD BIPLANE) 2D: 193 ML
SL CV PED ECHO LEFT VENTRICLE SYSTOLIC VOLUME (MOD BIPLANE) 2D: 84 ML
SODIUM SERPL-SCNC: 142 MMOL/L (ref 136–145)
TRICUSPID VALVE PEAK REGURGITATION VELOCITY: 3.49 M/S
TRICUSPID VALVE S': 1.1 CM/S
TV PEAK GRADIENT: 49 MMHG
Z-SCORE OF LEFT VENTRICULAR DIMENSION IN END SYSTOLE: -13.15

## 2021-11-23 PROCEDURE — 99232 SBSQ HOSP IP/OBS MODERATE 35: CPT | Performed by: INTERNAL MEDICINE

## 2021-11-23 PROCEDURE — 83735 ASSAY OF MAGNESIUM: CPT | Performed by: NURSE PRACTITIONER

## 2021-11-23 PROCEDURE — 94760 N-INVAS EAR/PLS OXIMETRY 1: CPT

## 2021-11-23 PROCEDURE — 94640 AIRWAY INHALATION TREATMENT: CPT

## 2021-11-23 PROCEDURE — 80053 COMPREHEN METABOLIC PANEL: CPT | Performed by: NURSE PRACTITIONER

## 2021-11-23 PROCEDURE — 93306 TTE W/DOPPLER COMPLETE: CPT | Performed by: INTERNAL MEDICINE

## 2021-11-23 PROCEDURE — 94660 CPAP INITIATION&MGMT: CPT

## 2021-11-23 PROCEDURE — 97110 THERAPEUTIC EXERCISES: CPT

## 2021-11-23 PROCEDURE — 97530 THERAPEUTIC ACTIVITIES: CPT

## 2021-11-23 RX ORDER — POLYETHYLENE GLYCOL 3350 17 G/17G
17 POWDER, FOR SOLUTION ORAL DAILY
Status: DISCONTINUED | OUTPATIENT
Start: 2021-11-23 | End: 2021-11-24 | Stop reason: HOSPADM

## 2021-11-23 RX ORDER — TORSEMIDE 20 MG/1
20 TABLET ORAL DAILY
Status: DISCONTINUED | OUTPATIENT
Start: 2021-11-24 | End: 2021-11-24

## 2021-11-23 RX ADMIN — RIVAROXABAN 20 MG: 20 TABLET, FILM COATED ORAL at 15:43

## 2021-11-23 RX ADMIN — FUROSEMIDE 40 MG: 10 INJECTION, SOLUTION INTRAMUSCULAR; INTRAVENOUS at 15:43

## 2021-11-23 RX ADMIN — OXYCODONE HYDROCHLORIDE AND ACETAMINOPHEN 500 MG: 500 TABLET ORAL at 08:33

## 2021-11-23 RX ADMIN — IPRATROPIUM BROMIDE AND ALBUTEROL SULFATE 3 ML: 2.5; .5 SOLUTION RESPIRATORY (INHALATION) at 07:33

## 2021-11-23 RX ADMIN — GABAPENTIN 600 MG: 300 CAPSULE ORAL at 22:02

## 2021-11-23 RX ADMIN — BENZONATATE 200 MG: 100 CAPSULE ORAL at 08:33

## 2021-11-23 RX ADMIN — BENZONATATE 200 MG: 100 CAPSULE ORAL at 15:43

## 2021-11-23 RX ADMIN — FINASTERIDE 5 MG: 5 TABLET, FILM COATED ORAL at 22:02

## 2021-11-23 RX ADMIN — DOCUSATE SODIUM 100 MG: 100 CAPSULE ORAL at 17:29

## 2021-11-23 RX ADMIN — DOCUSATE SODIUM 100 MG: 100 CAPSULE ORAL at 08:33

## 2021-11-23 RX ADMIN — FUROSEMIDE 40 MG: 10 INJECTION, SOLUTION INTRAMUSCULAR; INTRAVENOUS at 08:33

## 2021-11-23 RX ADMIN — DULOXETINE HYDROCHLORIDE 60 MG: 60 CAPSULE, DELAYED RELEASE ORAL at 08:33

## 2021-11-23 RX ADMIN — IPRATROPIUM BROMIDE AND ALBUTEROL SULFATE 3 ML: 2.5; .5 SOLUTION RESPIRATORY (INHALATION) at 19:26

## 2021-11-23 RX ADMIN — CLOPIDOGREL BISULFATE 75 MG: 75 TABLET ORAL at 22:02

## 2021-11-23 RX ADMIN — Medication 1 TABLET: at 08:33

## 2021-11-23 RX ADMIN — PRAVASTATIN SODIUM 40 MG: 40 TABLET ORAL at 15:43

## 2021-11-23 RX ADMIN — BENZONATATE 200 MG: 100 CAPSULE ORAL at 21:59

## 2021-11-23 RX ADMIN — ALLOPURINOL 200 MG: 100 TABLET ORAL at 08:33

## 2021-11-23 RX ADMIN — FELODIPINE 5 MG: 2.5 TABLET, FILM COATED, EXTENDED RELEASE ORAL at 08:34

## 2021-11-23 RX ADMIN — IPRATROPIUM BROMIDE AND ALBUTEROL SULFATE 3 ML: 2.5; .5 SOLUTION RESPIRATORY (INHALATION) at 14:58

## 2021-11-23 RX ADMIN — FLUTICASONE FUROATE AND VILANTEROL TRIFENATATE 1 PUFF: 200; 25 POWDER RESPIRATORY (INHALATION) at 08:33

## 2021-11-23 RX ADMIN — POLYETHYLENE GLYCOL 3350 17 G: 17 POWDER, FOR SOLUTION ORAL at 10:44

## 2021-11-24 VITALS
RESPIRATION RATE: 18 BRPM | HEIGHT: 73 IN | WEIGHT: 289.46 LBS | TEMPERATURE: 98 F | BODY MASS INDEX: 38.36 KG/M2 | HEART RATE: 79 BPM | OXYGEN SATURATION: 98 % | DIASTOLIC BLOOD PRESSURE: 56 MMHG | SYSTOLIC BLOOD PRESSURE: 118 MMHG

## 2021-11-24 LAB
ANION GAP SERPL CALCULATED.3IONS-SCNC: 10 MMOL/L (ref 4–13)
BUN SERPL-MCNC: 32 MG/DL (ref 5–25)
CALCIUM SERPL-MCNC: 8.8 MG/DL (ref 8.3–10.1)
CHLORIDE SERPL-SCNC: 105 MMOL/L (ref 100–108)
CO2 SERPL-SCNC: 28 MMOL/L (ref 21–32)
CREAT SERPL-MCNC: 1.44 MG/DL (ref 0.6–1.3)
GFR SERPL CREATININE-BSD FRML MDRD: 45 ML/MIN/1.73SQ M
GLUCOSE SERPL-MCNC: 114 MG/DL (ref 65–140)
POTASSIUM SERPL-SCNC: 3.7 MMOL/L (ref 3.5–5.3)
SODIUM SERPL-SCNC: 143 MMOL/L (ref 136–145)

## 2021-11-24 PROCEDURE — 99239 HOSP IP/OBS DSCHRG MGMT >30: CPT | Performed by: INTERNAL MEDICINE

## 2021-11-24 PROCEDURE — 94640 AIRWAY INHALATION TREATMENT: CPT

## 2021-11-24 PROCEDURE — 94760 N-INVAS EAR/PLS OXIMETRY 1: CPT

## 2021-11-24 PROCEDURE — 97530 THERAPEUTIC ACTIVITIES: CPT

## 2021-11-24 PROCEDURE — 99232 SBSQ HOSP IP/OBS MODERATE 35: CPT | Performed by: INTERNAL MEDICINE

## 2021-11-24 PROCEDURE — 80048 BASIC METABOLIC PNL TOTAL CA: CPT | Performed by: INTERNAL MEDICINE

## 2021-11-24 RX ORDER — FUROSEMIDE 40 MG/1
40 TABLET ORAL
Status: DISCONTINUED | OUTPATIENT
Start: 2021-11-24 | End: 2021-11-24 | Stop reason: HOSPADM

## 2021-11-24 RX ORDER — FUROSEMIDE 40 MG/1
40 TABLET ORAL 2 TIMES DAILY
Qty: 60 TABLET | Refills: 0 | Status: SHIPPED | OUTPATIENT
Start: 2021-11-24 | End: 2021-12-01 | Stop reason: SDUPTHER

## 2021-11-24 RX ORDER — POTASSIUM CHLORIDE 20 MEQ/1
20 TABLET, EXTENDED RELEASE ORAL DAILY
Status: DISCONTINUED | OUTPATIENT
Start: 2021-11-24 | End: 2021-11-24 | Stop reason: HOSPADM

## 2021-11-24 RX ORDER — POTASSIUM CHLORIDE 20 MEQ/1
20 TABLET, EXTENDED RELEASE ORAL DAILY
Qty: 30 TABLET | Refills: 0 | Status: SHIPPED | OUTPATIENT
Start: 2021-11-24 | End: 2021-11-24

## 2021-11-24 RX ORDER — POTASSIUM CHLORIDE 20 MEQ/1
20 TABLET, EXTENDED RELEASE ORAL DAILY
Qty: 30 TABLET | Refills: 0 | Status: SHIPPED | OUTPATIENT
Start: 2021-11-24 | End: 2022-01-24

## 2021-11-24 RX ORDER — FUROSEMIDE 40 MG/1
40 TABLET ORAL 2 TIMES DAILY
Qty: 60 TABLET | Refills: 0 | Status: SHIPPED | OUTPATIENT
Start: 2021-11-24 | End: 2021-11-24 | Stop reason: SDUPTHER

## 2021-11-24 RX ADMIN — FUROSEMIDE 40 MG: 40 TABLET ORAL at 16:13

## 2021-11-24 RX ADMIN — BENZONATATE 200 MG: 100 CAPSULE ORAL at 08:30

## 2021-11-24 RX ADMIN — ALLOPURINOL 200 MG: 100 TABLET ORAL at 08:30

## 2021-11-24 RX ADMIN — PRAVASTATIN SODIUM 40 MG: 40 TABLET ORAL at 16:13

## 2021-11-24 RX ADMIN — DOCUSATE SODIUM 100 MG: 100 CAPSULE ORAL at 08:30

## 2021-11-24 RX ADMIN — IPRATROPIUM BROMIDE AND ALBUTEROL SULFATE 3 ML: 2.5; .5 SOLUTION RESPIRATORY (INHALATION) at 07:13

## 2021-11-24 RX ADMIN — DULOXETINE HYDROCHLORIDE 60 MG: 60 CAPSULE, DELAYED RELEASE ORAL at 08:30

## 2021-11-24 RX ADMIN — FLUTICASONE FUROATE AND VILANTEROL TRIFENATATE 1 PUFF: 200; 25 POWDER RESPIRATORY (INHALATION) at 08:31

## 2021-11-24 RX ADMIN — OXYCODONE HYDROCHLORIDE AND ACETAMINOPHEN 500 MG: 500 TABLET ORAL at 08:30

## 2021-11-24 RX ADMIN — POLYETHYLENE GLYCOL 3350 17 G: 17 POWDER, FOR SOLUTION ORAL at 08:29

## 2021-11-24 RX ADMIN — FELODIPINE 5 MG: 2.5 TABLET, FILM COATED, EXTENDED RELEASE ORAL at 08:30

## 2021-11-24 RX ADMIN — IPRATROPIUM BROMIDE AND ALBUTEROL SULFATE 3 ML: 2.5; .5 SOLUTION RESPIRATORY (INHALATION) at 13:12

## 2021-11-24 RX ADMIN — RIVAROXABAN 20 MG: 20 TABLET, FILM COATED ORAL at 16:13

## 2021-11-24 RX ADMIN — BENZONATATE 200 MG: 100 CAPSULE ORAL at 16:13

## 2021-11-24 RX ADMIN — TORSEMIDE 20 MG: 20 TABLET ORAL at 08:30

## 2021-11-24 RX ADMIN — Medication 1 TABLET: at 08:30

## 2021-11-24 RX ADMIN — POTASSIUM CHLORIDE 20 MEQ: 1500 TABLET, EXTENDED RELEASE ORAL at 16:13

## 2021-11-26 ENCOUNTER — TRANSITIONAL CARE MANAGEMENT (OUTPATIENT)
Dept: FAMILY MEDICINE CLINIC | Facility: CLINIC | Age: 81
End: 2021-11-26

## 2021-11-29 ENCOUNTER — PATIENT OUTREACH (OUTPATIENT)
Dept: CASE MANAGEMENT | Facility: HOSPITAL | Age: 81
End: 2021-11-29

## 2021-11-29 DIAGNOSIS — I50.33 ACUTE ON CHRONIC DIASTOLIC HEART FAILURE (HCC): Primary | ICD-10-CM

## 2021-12-01 ENCOUNTER — PATIENT OUTREACH (OUTPATIENT)
Dept: CASE MANAGEMENT | Facility: HOSPITAL | Age: 81
End: 2021-12-01

## 2021-12-01 ENCOUNTER — OFFICE VISIT (OUTPATIENT)
Dept: FAMILY MEDICINE CLINIC | Facility: CLINIC | Age: 81
End: 2021-12-01
Payer: MEDICARE

## 2021-12-01 VITALS
SYSTOLIC BLOOD PRESSURE: 118 MMHG | RESPIRATION RATE: 18 BRPM | HEIGHT: 73 IN | DIASTOLIC BLOOD PRESSURE: 62 MMHG | HEART RATE: 57 BPM | OXYGEN SATURATION: 97 % | WEIGHT: 300 LBS | TEMPERATURE: 97.6 F | BODY MASS INDEX: 39.76 KG/M2

## 2021-12-01 DIAGNOSIS — I25.10 CORONARY ARTERY DISEASE INVOLVING NATIVE CORONARY ARTERY OF NATIVE HEART WITHOUT ANGINA PECTORIS: Primary | Chronic | ICD-10-CM

## 2021-12-01 DIAGNOSIS — R60.0 BILATERAL LEG EDEMA: ICD-10-CM

## 2021-12-01 DIAGNOSIS — G62.9 NEUROPATHY: ICD-10-CM

## 2021-12-01 DIAGNOSIS — I50.32 CHRONIC DIASTOLIC CONGESTIVE HEART FAILURE (HCC): ICD-10-CM

## 2021-12-01 DIAGNOSIS — I10 ESSENTIAL HYPERTENSION: ICD-10-CM

## 2021-12-01 DIAGNOSIS — E66.9 OBESITY (BMI 30-39.9): ICD-10-CM

## 2021-12-01 PROBLEM — N18.31 STAGE 3A CHRONIC KIDNEY DISEASE (HCC): Status: ACTIVE | Noted: 2019-04-18

## 2021-12-01 PROCEDURE — 99496 TRANSJ CARE MGMT HIGH F2F 7D: CPT | Performed by: FAMILY MEDICINE

## 2021-12-01 RX ORDER — FUROSEMIDE 40 MG/1
40 TABLET ORAL 2 TIMES DAILY
Qty: 60 TABLET | Refills: 0
Start: 2021-12-01 | End: 2022-02-07 | Stop reason: HOSPADM

## 2021-12-06 ENCOUNTER — PATIENT OUTREACH (OUTPATIENT)
Dept: CASE MANAGEMENT | Facility: HOSPITAL | Age: 81
End: 2021-12-06

## 2021-12-06 ENCOUNTER — OFFICE VISIT (OUTPATIENT)
Dept: FAMILY MEDICINE CLINIC | Facility: CLINIC | Age: 81
End: 2021-12-06
Payer: MEDICARE

## 2021-12-06 VITALS
RESPIRATION RATE: 20 BRPM | WEIGHT: 295.2 LBS | HEIGHT: 73 IN | SYSTOLIC BLOOD PRESSURE: 126 MMHG | DIASTOLIC BLOOD PRESSURE: 76 MMHG | BODY MASS INDEX: 39.12 KG/M2 | HEART RATE: 70 BPM | TEMPERATURE: 97.9 F

## 2021-12-06 DIAGNOSIS — S41.111A SKIN TEAR OF RIGHT UPPER ARM WITHOUT COMPLICATION, INITIAL ENCOUNTER: Primary | ICD-10-CM

## 2021-12-06 DIAGNOSIS — S30.0XXA CONTUSION, BUTTOCK, INITIAL ENCOUNTER: ICD-10-CM

## 2021-12-06 DIAGNOSIS — I48.20 CHRONIC A-FIB (HCC): ICD-10-CM

## 2021-12-06 PROCEDURE — 99214 OFFICE O/P EST MOD 30 MIN: CPT | Performed by: FAMILY MEDICINE

## 2021-12-08 ENCOUNTER — TELEPHONE (OUTPATIENT)
Dept: NEPHROLOGY | Facility: CLINIC | Age: 81
End: 2021-12-08

## 2021-12-08 ENCOUNTER — TELEPHONE (OUTPATIENT)
Dept: FAMILY MEDICINE CLINIC | Facility: CLINIC | Age: 81
End: 2021-12-08

## 2021-12-13 DIAGNOSIS — I10 ESSENTIAL HYPERTENSION: ICD-10-CM

## 2021-12-13 DIAGNOSIS — I25.10 CORONARY ARTERY ARTERIOSCLEROSIS: ICD-10-CM

## 2021-12-13 DIAGNOSIS — F41.1 GAD (GENERALIZED ANXIETY DISORDER): ICD-10-CM

## 2021-12-13 DIAGNOSIS — G62.9 NEUROPATHY: ICD-10-CM

## 2021-12-13 RX ORDER — FELODIPINE 5 MG/1
5 TABLET, EXTENDED RELEASE ORAL DAILY
Qty: 90 TABLET | Refills: 1 | Status: SHIPPED | OUTPATIENT
Start: 2021-12-13 | End: 2022-02-07 | Stop reason: HOSPADM

## 2021-12-13 RX ORDER — SIMVASTATIN 20 MG
20 TABLET ORAL
Qty: 90 TABLET | Refills: 1 | Status: SHIPPED | OUTPATIENT
Start: 2021-12-13 | End: 2022-06-21

## 2021-12-13 RX ORDER — DULOXETIN HYDROCHLORIDE 60 MG/1
60 CAPSULE, DELAYED RELEASE ORAL DAILY
Qty: 90 CAPSULE | Refills: 1 | Status: SHIPPED | OUTPATIENT
Start: 2021-12-13 | End: 2022-02-23 | Stop reason: SDUPTHER

## 2021-12-13 RX ORDER — CLOPIDOGREL BISULFATE 75 MG/1
75 TABLET ORAL DAILY
Qty: 90 TABLET | Refills: 1 | Status: SHIPPED | OUTPATIENT
Start: 2021-12-13 | End: 2022-02-07 | Stop reason: HOSPADM

## 2021-12-15 ENCOUNTER — PATIENT OUTREACH (OUTPATIENT)
Dept: CASE MANAGEMENT | Facility: HOSPITAL | Age: 81
End: 2021-12-15

## 2021-12-16 ENCOUNTER — OFFICE VISIT (OUTPATIENT)
Dept: PULMONOLOGY | Facility: MEDICAL CENTER | Age: 81
End: 2021-12-16
Payer: MEDICARE

## 2021-12-16 VITALS
RESPIRATION RATE: 12 BRPM | WEIGHT: 296.4 LBS | BODY MASS INDEX: 39.28 KG/M2 | HEIGHT: 73 IN | OXYGEN SATURATION: 100 % | HEART RATE: 72 BPM | SYSTOLIC BLOOD PRESSURE: 138 MMHG | TEMPERATURE: 96.5 F | DIASTOLIC BLOOD PRESSURE: 64 MMHG

## 2021-12-16 DIAGNOSIS — J43.2 CENTRILOBULAR EMPHYSEMA (HCC): Primary | ICD-10-CM

## 2021-12-16 DIAGNOSIS — I50.32 CHRONIC DIASTOLIC CHF (CONGESTIVE HEART FAILURE) (HCC): ICD-10-CM

## 2021-12-16 DIAGNOSIS — G47.33 OSA (OBSTRUCTIVE SLEEP APNEA): ICD-10-CM

## 2021-12-16 DIAGNOSIS — J43.2 CENTRILOBULAR EMPHYSEMA (HCC): ICD-10-CM

## 2021-12-16 DIAGNOSIS — E66.9 OBESITY (BMI 30-39.9): ICD-10-CM

## 2021-12-16 DIAGNOSIS — R06.2 WHEEZING: ICD-10-CM

## 2021-12-16 PROCEDURE — 99214 OFFICE O/P EST MOD 30 MIN: CPT | Performed by: INTERNAL MEDICINE

## 2021-12-16 RX ORDER — FLUTICASONE FUROATE, UMECLIDINIUM BROMIDE AND VILANTEROL TRIFENATATE 200; 62.5; 25 UG/1; UG/1; UG/1
1 POWDER RESPIRATORY (INHALATION) DAILY
Qty: 180 BLISTER | Refills: 7 | Status: SHIPPED | OUTPATIENT
Start: 2021-12-16 | End: 2021-12-16

## 2021-12-16 RX ORDER — PREDNISONE 20 MG/1
20 TABLET ORAL DAILY
Qty: 5 TABLET | Refills: 0 | Status: SHIPPED | OUTPATIENT
Start: 2021-12-16 | End: 2021-12-21

## 2021-12-16 RX ORDER — SPIRONOLACTONE 25 MG/1
25 TABLET ORAL DAILY
COMMUNITY
Start: 2021-12-10 | End: 2022-02-07 | Stop reason: HOSPADM

## 2021-12-17 RX ORDER — FLUTICASONE FUROATE, UMECLIDINIUM BROMIDE AND VILANTEROL TRIFENATATE 200; 62.5; 25 UG/1; UG/1; UG/1
1 POWDER RESPIRATORY (INHALATION) DAILY
Qty: 180 BLISTER | Refills: 3 | Status: SHIPPED | OUTPATIENT
Start: 2021-12-17

## 2021-12-22 ENCOUNTER — PATIENT OUTREACH (OUTPATIENT)
Dept: CASE MANAGEMENT | Facility: HOSPITAL | Age: 81
End: 2021-12-22

## 2022-01-01 DIAGNOSIS — Z98.890 STATUS POST SURGERY: Primary | ICD-10-CM

## 2022-01-01 DIAGNOSIS — M54.16 RADICULOPATHY OF LUMBAR REGION: Primary | ICD-10-CM

## 2022-01-01 RX ORDER — GABAPENTIN 100 MG/1
100 CAPSULE ORAL
Qty: 30 CAPSULE | Refills: 1 | Status: ON HOLD | OUTPATIENT
Start: 2022-01-01 | End: 2022-01-01

## 2022-01-04 ENCOUNTER — TELEPHONE (OUTPATIENT)
Dept: NEPHROLOGY | Facility: CLINIC | Age: 82
End: 2022-01-04

## 2022-01-04 ENCOUNTER — OFFICE VISIT (OUTPATIENT)
Dept: FAMILY MEDICINE CLINIC | Facility: CLINIC | Age: 82
End: 2022-01-04
Payer: MEDICARE

## 2022-01-04 VITALS
WEIGHT: 294 LBS | HEIGHT: 73 IN | HEART RATE: 60 BPM | SYSTOLIC BLOOD PRESSURE: 120 MMHG | TEMPERATURE: 97.3 F | BODY MASS INDEX: 38.97 KG/M2 | RESPIRATION RATE: 26 BRPM | DIASTOLIC BLOOD PRESSURE: 60 MMHG | OXYGEN SATURATION: 100 %

## 2022-01-04 DIAGNOSIS — G62.9 NEUROPATHY: Primary | ICD-10-CM

## 2022-01-04 DIAGNOSIS — R25.2 BILATERAL LEG CRAMPS: ICD-10-CM

## 2022-01-04 DIAGNOSIS — G47.33 OSA (OBSTRUCTIVE SLEEP APNEA): ICD-10-CM

## 2022-01-04 DIAGNOSIS — I50.32 CHRONIC DIASTOLIC CHF (CONGESTIVE HEART FAILURE) (HCC): ICD-10-CM

## 2022-01-04 DIAGNOSIS — I48.20 CHRONIC A-FIB (HCC): ICD-10-CM

## 2022-01-04 DIAGNOSIS — F41.1 GAD (GENERALIZED ANXIETY DISORDER): ICD-10-CM

## 2022-01-04 PROCEDURE — 99214 OFFICE O/P EST MOD 30 MIN: CPT | Performed by: FAMILY MEDICINE

## 2022-01-04 NOTE — TELEPHONE ENCOUNTER
I left detailed message for patient asking to have labs and urine studies done prior to appointment  Informed if going through [facility] Certain lab scripts are in system if going elsewhere asked to call us so we can send accordingly

## 2022-01-04 NOTE — PATIENT INSTRUCTIONS
Please increase the gabapentin from 300/600 to 600/600 for your neuropathy to see if it helps  If this does not help after one month, please call the office so we can agree to increase the gabapentin to 600mg three times a day  You can stay on the docusate (colace) 1 in am and 2 in pm, 100mg each, but add the miralax 17g daily  Since constipation is such an issue for you, the next step may be to have to see a gastroenterologist, as scheduled

## 2022-01-04 NOTE — PROGRESS NOTES
Assessment/Plan:    No problem-specific Assessment & Plan notes found for this encounter  CHF with PPM and episode of bradycardia to 40s, advised to contact Dr Elvira Luna for PPM f/u    JOO stable    Chronic constipation, continue colace, add miralax, has gi appt scheduled    JOVI unchanged, has daytime drowsiness, mutifactorial, could be jovi vs meds vs weight vs cardiac causes    Cough, copd unchanged, f/u pulmonary    Neuropathy, take 300/600 gabapentin, might titrate to 600 bid in 1m, can call in to report response, daughter aware    Cramps, check K and Mg     Diagnoses and all orders for this visit:    Neuropathy    Chronic diastolic CHF (congestive heart failure) (HCC)    JOO (generalized anxiety disorder)    Chronic a-fib (HCC)    JOVI (obstructive sleep apnea)    Bilateral leg cramps  -     Comprehensive metabolic panel; Future  -     Magnesium; Future          Return in about 3 months (around 4/4/2022) for Recheck  Subjective:      Patient ID: Deya Raza is a 80 y o  male  Chief Complaint   Patient presents with    Follow-up     on meds [id number]        HPI  Right arm stopped bleeding last time but reinjured  Vipin Alegrianer off the toilet at time    Ongoing constipation  Lots of vegetables, vegan  bm every other day  Needs to strain and push  Large and hard  Plugs toilet with bm    on colace, no effect 300mg/d  Not on miralax right now    bp is good    Ongoing neuropathy  Not better yet  Currently 300/300    Leg cramps  Oral at night  Not in daytime  Keeps him awake  Not crawling    Ongoing phlegm  No fever  SOB    Past week episodes of HR in 40s    Has JOVI  Uses cpap    Family notes daytime somnolence    The following portions of the patient's history were reviewed and updated as appropriate: allergies, current medications, past family history, past medical history, past social history, past surgical history and problem list     Review of Systems   Constitutional: Positive for fatigue   Negative for fever and unexpected weight change  Current Outpatient Medications   Medication Sig Dispense Refill    albuterol (Ventolin HFA) 90 mcg/act inhaler Inhale 2 puffs every 6 (six) hours as needed for wheezing 3 Inhaler 3    allopurinol (ZYLOPRIM) 100 mg tablet Take 2 tablets (200 mg total) by mouth daily 180 tablet 3    ascorbic acid (VITAMIN C) 500 mg tablet Take 500 mg by mouth daily   B Complex Vitamins (B COMPLEX 1 PO) Take 1 tablet by mouth daily   Biotin (BIOTIN 5000) 5 MG CAPS Take 1,000 mcg by mouth daily   calcium carbonate-vitamin D (OSCAL-D) 500 mg-200 units per tablet Take 2 tablets by mouth daily at bedtime   cholecalciferol (VITAMIN D3) 1,000 units tablet Take 1,000 Units by mouth daily      clopidogrel (PLAVIX) 75 mg tablet Take 1 tablet (75 mg total) by mouth daily 90 tablet 1    colchicine (COLCRYS) 0 6 mg tablet Take 1 tablet (0 6 mg total) by mouth 2 (two) times a day Take twice daily x 7 days as needed for gout 30 tablet 5    Cranberry 1000 MG CAPS Take 1 tablet by mouth 2 (two) times a day   cyanocobalamin 1000 MCG tablet Take 100 mcg by mouth daily      dextromethorphan-guaiFENesin (ROBITUSSIN DM)  mg/5 mL syrup Take 10 mL by mouth every 4 (four) hours as needed for cough 118 mL 0    docusate sodium (COLACE) 100 mg capsule Take 3 capsules (300 mg total) by mouth daily (Patient taking differently: Take 300 mg by mouth as needed  ) 90 capsule 5    DULoxetine (CYMBALTA) 60 mg delayed release capsule Take 1 capsule (60 mg total) by mouth daily 90 capsule 1    felodipine (PLENDIL) 5 mg 24 hr tablet Take 1 tablet (5 mg total) by mouth daily 90 tablet 1    finasteride (PROSCAR) 5 mg tablet Take 1 tablet (5 mg total) by mouth daily 90 tablet 3    Flaxseed, Linseed, (EQL FLAX SEED OIL) 1000 MG CAPS Take by mouth daily   fluticasone-umeclidinium-vilanterol (Trelegy Ellipta) 200-62 5-25 MCG/INH AEPB inhaler Inhale 1 puff daily Rinse mouth after use   [address] [place] [address] blister 3    furosemide (LASIX) 40 mg tablet Take 1 tablet (40 mg total) by mouth 2 (two) times a day 60 tablet 0    gabapentin (Neurontin) 300 mg capsule 300mg in am, 600mg in pm 270 capsule 1    glucosamine-chondroitin 500-400 MG tablet Take 1 tablet by mouth 2 (two) times a day   nicotine (NICODERM CQ) 7 mg/24hr TD 24 hr patch Place 1 patch on the skin daily 28 patch 0    potassium chloride (K-DUR,KLOR-CON) 20 mEq tablet Take 1 tablet (20 mEq total) by mouth daily 30 tablet 0    rivaroxaban (XARELTO) 15 mg tablet Take 1 tablet (15 mg total) by mouth daily with breakfast (Patient taking differently: Take 20 mg by mouth daily at bedtime )      simvastatin (ZOCOR) 20 mg tablet Take 1 tablet (20 mg total) by mouth daily at bedtime 90 tablet 1    spironolactone (ALDACTONE) 25 mg tablet       fluocinonide (LIDEX) 0 05 % cream Apply topically 2 (two) times a day Prn leg rash, short term (Patient taking differently: Apply topically as needed Prn leg rash, short term) 120 g 2     No current facility-administered medications for this visit  Objective:    /60   Pulse 60   Temp (!) 97 3 °F (36 3 °C)   Resp (!) 26   Ht 6' 1" (1 854 m)   Wt 133 kg (294 lb)   SpO2 100%   BMI 38 79 kg/m²        Physical Exam  Vitals and nursing note reviewed  Constitutional:       General: He is not in acute distress  Appearance: He is well-developed  He is obese  HENT:      Head: Normocephalic  Right Ear: Tympanic membrane normal       Left Ear: Tympanic membrane normal    Eyes:      General: No scleral icterus  Conjunctiva/sclera: Conjunctivae normal    Cardiovascular:      Rate and Rhythm: Normal rate and regular rhythm  Pulmonary:      Effort: Pulmonary effort is normal  No respiratory distress  Breath sounds: No rales  Abdominal:      Palpations: Abdomen is soft  Musculoskeletal:         General: No deformity  Cervical back: Neck supple     Skin:     General: Skin is warm and dry       Coloration: Skin is not jaundiced or pale  Neurological:      Mental Status: He is alert  Psychiatric:         Behavior: Behavior normal          Thought Content:  Thought content normal                 Damian Jones DO

## 2022-01-06 ENCOUNTER — OFFICE VISIT (OUTPATIENT)
Dept: NEPHROLOGY | Facility: CLINIC | Age: 82
End: 2022-01-06
Payer: MEDICARE

## 2022-01-06 VITALS
WEIGHT: 286 LBS | BODY MASS INDEX: 37.91 KG/M2 | SYSTOLIC BLOOD PRESSURE: 110 MMHG | DIASTOLIC BLOOD PRESSURE: 62 MMHG | HEIGHT: 73 IN

## 2022-01-06 DIAGNOSIS — N18.31 STAGE 3A CHRONIC KIDNEY DISEASE (HCC): ICD-10-CM

## 2022-01-06 DIAGNOSIS — E79.0 HYPERURICEMIA: ICD-10-CM

## 2022-01-06 DIAGNOSIS — R60.0 BILATERAL LEG EDEMA: ICD-10-CM

## 2022-01-06 DIAGNOSIS — E55.9 VITAMIN D INSUFFICIENCY: ICD-10-CM

## 2022-01-06 DIAGNOSIS — R80.1 PERSISTENT PROTEINURIA: ICD-10-CM

## 2022-01-06 DIAGNOSIS — I12.9 BENIGN HYPERTENSION WITH CHRONIC KIDNEY DISEASE, STAGE III (HCC): Primary | ICD-10-CM

## 2022-01-06 DIAGNOSIS — N18.30 BENIGN HYPERTENSION WITH CHRONIC KIDNEY DISEASE, STAGE III (HCC): Primary | ICD-10-CM

## 2022-01-06 DIAGNOSIS — N25.81 SECONDARY HYPERPARATHYROIDISM OF RENAL ORIGIN (HCC): ICD-10-CM

## 2022-01-06 LAB
ALBUMIN SERPL-MCNC: 3.7 G/DL (ref 3.6–4.6)
ALBUMIN/GLOB SERPL: 0.9 {RATIO} (ref 1.2–2.2)
ALP SERPL-CCNC: 98 IU/L (ref 44–121)
ALT SERPL-CCNC: 16 IU/L (ref 0–44)
AST SERPL-CCNC: 17 IU/L (ref 0–40)
BILIRUB SERPL-MCNC: 0.8 MG/DL (ref 0–1.2)
BUN SERPL-MCNC: 30 MG/DL (ref 8–27)
BUN/CREAT SERPL: 19 (ref 10–24)
CALCIUM SERPL-MCNC: 9.4 MG/DL (ref 8.6–10.2)
CHLORIDE SERPL-SCNC: 99 MMOL/L (ref 96–106)
CO2 SERPL-SCNC: 23 MMOL/L (ref 20–29)
CREAT SERPL-MCNC: 1.57 MG/DL (ref 0.76–1.27)
GLOBULIN SER-MCNC: 4.3 G/DL (ref 1.5–4.5)
GLUCOSE SERPL-MCNC: 136 MG/DL (ref 65–99)
MAGNESIUM SERPL-MCNC: 2.7 MG/DL (ref 1.6–2.3)
MICRODELETION SYND BLD/T FISH: NORMAL
POTASSIUM SERPL-SCNC: 4.5 MMOL/L (ref 3.5–5.2)
PROT SERPL-MCNC: 8 G/DL (ref 6–8.5)
SL AMB EGFR AFRICAN AMERICAN: 47 ML/MIN/1.73
SL AMB EGFR NON AFRICAN AMERICAN: 41 ML/MIN/1.73
SODIUM SERPL-SCNC: 138 MMOL/L (ref 134–144)

## 2022-01-06 PROCEDURE — 99214 OFFICE O/P EST MOD 30 MIN: CPT | Performed by: INTERNAL MEDICINE

## 2022-01-06 NOTE — PROGRESS NOTES
NEPHROLOGY OUTPATIENT PROGRESS NOTE   Misa Lucero 80 y o  male MRN: [id number]  DATE: 1/6/2022  Reason for visit:   Chief Complaint   Patient presents with    Follow-up    Chronic Kidney Disease     ASSESSMENT and PLAN:  CKD stage III, baseline serum creatinine 1 3-1 5  -creatinine  1 5 in January 2022  [place] in November 2021 shows 30 to 50 RBCs, no proteinuria during hospitalization  -will do repeat UA with microscopy, BMP before next visit  -CKD likely secondary to long-term hypertension causing hypertensive nephrosclerosis, age-related nephron loss, morbid obesity   -Avoid nephrotoxins or NSAIDs        Proteinuria, last urine microalbumin/creatinine ratio 36 mg in June 2021  Continue to monitor   - repeat urine microalbumin/creatinine ratio before next visit  -Could be secondary to underlying hypertension +/- obesity related secondary FSGS component   -May Consider ACE inhibitor eventually if renal function remains stable and proteinuria worsening     History of gross hematuria  -Denies any recent episodes of gross hematuria   Recent UA shows microscopic hematuria during hospitalization  Will do repeat UA with microscopy  Continue urology follow-up      Hypertension  -  BP well controlled in the office today  - Salt restricted diet   -currently on felodipine  If blood pressure remains lower, may consider discontinue this  Lasix as below     Diastolic CHF  -recent echo in November 2021 shows EF 55%, severely abnormal diastolic function, borderline severe aortic stenosis, mild-to-moderate MR, mild-to-moderate MS, moderate TR  -he was recently hospitalized with CHF exacerbation in November 2021  Prior to hospitalization weight was up to 295 lb which improved to 289 lb on discharge  Recently at home weight seems to be around 286 to 287 lb    -he was discharged on Lasix 40 mg b i d  although he currently has been taking 40 mg in a m  20 mg in p m  on his own   -given improving weight, continue current Lasix dosing  If weight > 288 lb or if has weight gain greater than 5 lb in 2 to 3 days with worsening dyspnea or leg edema, advised to increase Lasix to 40 mg b i d  -follows with Cardiology regarding aortic stenosis  As per patient, he needs weight loss prior to surgical intervention       Secondary hyperparathyroidism,  last PTH 33 in June 2021   Repeat before next visit  Vitamin-D insufficiency, vitamin-D level has improved 42 6 in June 2021  Currently on 1000 units daily  Repeat level before next visit     Hyperuricemia with history of gout  -serum uric acid slightly elevated 8 3 in June 2021, On allopurinol 200 mg daily   Repeat serum uric acid level before next visit     Morbid obesity, discussed diet regimen, needs weight loss   CAD, status post PTCA in April 2019 and again in May 2019      Diagnoses and all orders for this visit:    Benign hypertension with chronic kidney disease, stage III ([facility] [address] )  -     Basic metabolic panel; Future  -     CBC; Future  -     Phosphorus; Future  -     PTH, intact; Future  -     Vitamin D 25 hydroxy; Future  -     Magnesium; Future  -     Protein / creatinine ratio, urine; Future  -     UA (URINE) with reflex to Scope; Future    Stage 3a chronic kidney disease (HCC)  -     Basic metabolic panel; Future  -     CBC; Future  -     Phosphorus; Future  -     PTH, intact; Future  -     Vitamin D 25 hydroxy; Future  -     Magnesium; Future  -     Protein / creatinine ratio, urine; Future  -     UA (URINE) with reflex to Scope; Future  -     Uric acid; Future    Persistent proteinuria  -     Protein / creatinine ratio, urine; Future  -     UA (URINE) with reflex to Scope; Future    Vitamin D insufficiency  -     PTH, intact; Future  -     Vitamin D 25 hydroxy; Future    Bilateral leg edema    Secondary hyperparathyroidism of renal origin ([facility] [address] )  -     Phosphorus; Future  -     PTH, intact; Future  -     Vitamin D 25 hydroxy; Future    Hyperuricemia  -     Uric acid;  Future SUBJECTIVE / HPI:  Patient is 80year-old male with significant medical issues of hypertension for many years, CAD, status post PTCA, status post pacemaker placement, morbidly obese, CKD stage III with baseline creatinine 1 3-1 5, gout, AFib, JOVI on BiPAP, aortic stenosis, diastolic CHF comes for regular follow-up of CKD  he was recently hospitalized in November 2021 with CHF exacerbation in the setting of moderate to severe aortic stenosis, his weight improved with IV diuresis 289 lb on discharge  Lately at home his weight seems to be around 286 to 287 lb  His leg edema seems to have much improved  He still has exertional dyspnea issues which could be multifactorial   Denies any nausea, vomiting  Denies any recent gout flare-up issues  REVIEW OF SYSTEMS:  More than 10 point review of systems were obtained and discussed in length with the patient  Complete review of systems were negative / unremarkable except mentioned above  PHYSICAL EXAM:  Vitals:    01/06/22 1137 01/06/22 1214   BP:  110/62   Weight: 130 kg (286 lb)    Height: 6' 1" (1 854 m)      Body mass index is 37 73 kg/m²  Physical Exam  Vitals reviewed  Constitutional:       Appearance: He is well-developed  HENT:      Head: Normocephalic and atraumatic  Right Ear: External ear normal       Left Ear: External ear normal    Eyes:      General: No scleral icterus  Conjunctiva/sclera: Conjunctivae normal    Cardiovascular:      Comments: S1, S2 present  Pulmonary:      Effort: Pulmonary effort is normal  No respiratory distress  Breath sounds: Normal breath sounds  No wheezing or rales  Abdominal:      General: Bowel sounds are normal  There is no distension  Palpations: Abdomen is soft  Tenderness: There is no abdominal tenderness  Musculoskeletal:         General: No tenderness  Right lower leg: No edema  Left lower leg: No edema  Lymphadenopathy:      Cervical: No cervical adenopathy     Skin: Findings: No rash  Neurological:      Mental Status: He is alert and oriented to person, place, and time  Psychiatric:         Behavior: Behavior normal          PAST MEDICAL HISTORY:  Past Medical History:   Diagnosis Date    Arthritis     Atrial flutter ([facility] [address] )     Chronic kidney disease     stage 3    Coronary artery disease     2 stents    Fluid retention     Gout     Heart failure (HCC)     pacemaker    Hypertension     Pacemaker     Pulmonary emphysema ([facility] [address] )     Radiculopathy     last assessed 16     Shortness of breath     exertion    Sleep apnea     c pap       PAST SURGICAL HISTORY:  Past Surgical History:   Procedure Laterality Date    ANGIOPLASTY      x2 2 stents and then replaced    CARDIAC PACEMAKER PLACEMENT      pacemaker permanent placement dual chamber / last assessed 14 / implantation     CARDIAC SURGERY      pacemaker    CHOLECYSTECTOMY      CORONARY ANGIOPLASTY WITH STENT PLACEMENT      EPIDURAL BLOCK INJECTION N/A 2016    Procedure: BLOCK / INJECTION EPIDURAL STEROID LUMBAR  L4-5;  Surgeon: Rayna Akhtar MD;  Location: [facility] MAIN OR;  Service:     EPIDURAL BLOCK INJECTION N/A 2019    Procedure: L4 L5 Lumbar Epidural Steroid Injection;  Surgeon: Rayna Akhtar MD;  Location: [facility] MAIN OR;  Service: Pain Management     EYE SURGERY      cataract left    KNEE ARTHROSCOPY W/ MENISCAL REPAIR Left     LUMBAR EPIDURAL INJECTION N/A 3/17/2016    Procedure: BLOCK / INJECTION LUMBAR  L4-5  (C-ARM);   Surgeon: Rayna Akhtar MD;  Location: [facility] MAIN OR;  Service:        SOCIAL HISTORY:  Social History     Substance and Sexual Activity   Alcohol Use Never     Social History     Substance and Sexual Activity   Drug Use Never     Social History     Tobacco Use   Smoking Status Former Smoker    Packs/day: 1 00    Years: 50 00    Pack years: 50 00    Types: Cigarettes    Quit date: 3/27/2019    Years since quittin 7   Smokeless Tobacco Never Used Tobacco Comment    quit 02/14/2021       FAMILY HISTORY:  Family History   Problem Relation Age of Onset    Cancer Mother 80    Heart disease Mother     Hypertension Mother     Heart disease Father     Diabetes Neg Hx     Stroke Neg Hx        MEDICATIONS:    Current Outpatient Medications:     albuterol (Ventolin HFA) 90 mcg/act inhaler, Inhale 2 puffs every 6 (six) hours as needed for wheezing, Disp: 3 Inhaler, Rfl: 3    allopurinol (ZYLOPRIM) 100 mg tablet, Take 2 tablets (200 mg total) by mouth daily, Disp: 180 tablet, Rfl: 3    ascorbic acid (VITAMIN C) 500 mg tablet, Take 500 mg by mouth daily  , Disp: , Rfl:     B Complex Vitamins (B COMPLEX 1 PO), Take 1 tablet by mouth daily  , Disp: , Rfl:     Biotin (BIOTIN 5000) 5 MG CAPS, Take 1,000 mcg by mouth daily  , Disp: , Rfl:     calcium carbonate-vitamin D (OSCAL-D) 500 mg-200 units per tablet, Take 2 tablets by mouth daily at bedtime  , Disp: , Rfl:     cholecalciferol (VITAMIN D3) 1,000 units tablet, Take 1,000 Units by mouth daily, Disp: , Rfl:     clopidogrel (PLAVIX) 75 mg tablet, Take 1 tablet (75 mg total) by mouth daily, Disp: 90 tablet, Rfl: 1    Cranberry 1000 MG CAPS, Take 1 tablet by mouth 2 (two) times a day , Disp: , Rfl:     cyanocobalamin 1000 MCG tablet, Take 100 mcg by mouth daily, Disp: , Rfl:     docusate sodium (COLACE) 100 mg capsule, Take 3 capsules (300 mg total) by mouth daily (Patient taking differently: Take 300 mg by mouth as needed  ), Disp: 90 capsule, Rfl: 5    DULoxetine (CYMBALTA) 60 mg delayed release capsule, Take 1 capsule (60 mg total) by mouth daily, Disp: 90 capsule, Rfl: 1    felodipine (PLENDIL) 5 mg 24 hr tablet, Take 1 tablet (5 mg total) by mouth daily, Disp: 90 tablet, Rfl: 1    finasteride (PROSCAR) 5 mg tablet, Take 1 tablet (5 mg total) by mouth daily, Disp: 90 tablet, Rfl: 3    Flaxseed, Linseed, (EQL FLAX SEED OIL) 1000 MG CAPS, Take by mouth daily  , Disp: , Rfl:    fluticasone-umeclidinium-vilanterol (Trelegy Ellipta) 200-62 5-25 MCG/INH AEPB inhaler, Inhale 1 puff daily Rinse mouth after use , Disp: 180 blister, Rfl: 3    furosemide (LASIX) 40 mg tablet, Take 1 tablet (40 mg total) by mouth 2 (two) times a day, Disp: 60 tablet, Rfl: 0    gabapentin (Neurontin) 300 mg capsule, 300mg in am, 600mg in pm, Disp: 270 capsule, Rfl: 1    glucosamine-chondroitin 500-400 MG tablet, Take 1 tablet by mouth 2 (two) times a day , Disp: , Rfl:     potassium chloride (K-DUR,KLOR-CON) 20 mEq tablet, Take 1 tablet (20 mEq total) by mouth daily, Disp: 30 tablet, Rfl: 0    rivaroxaban (XARELTO) 15 mg tablet, Take 1 tablet (15 mg total) by mouth daily with breakfast (Patient taking differently: Take 20 mg by mouth daily at bedtime ), Disp: , Rfl:     simvastatin (ZOCOR) 20 mg tablet, Take 1 tablet (20 mg total) by mouth daily at bedtime, Disp: 90 tablet, Rfl: 1    spironolactone (ALDACTONE) 25 mg tablet, 25 mg daily  , Disp: , Rfl:     colchicine (COLCRYS) 0 6 mg tablet, Take 1 tablet (0 6 mg total) by mouth 2 (two) times a day Take twice daily x 7 days as needed for gout (Patient taking differently: Take 0 6 mg by mouth Take twice daily x 7 days as needed for gout ), Disp: 30 tablet, Rfl: 5    dextromethorphan-guaiFENesin (ROBITUSSIN DM)  mg/5 mL syrup, Take 10 mL by mouth every 4 (four) hours as needed for cough, Disp: 118 mL, Rfl: 0    fluocinonide (LIDEX) 0 05 % cream, Apply topically 2 (two) times a day Prn leg rash, short term (Patient taking differently: Apply topically as needed Prn leg rash, short term), Disp: 120 g, Rfl: 2    nicotine (NICODERM CQ) 7 mg/24hr TD 24 hr patch, Place 1 patch on the skin daily (Patient not taking: Reported on 1/6/2022 ), Disp: 28 patch, Rfl: 0    Lab Results:   Results for orders placed or performed in visit on 01/05/22   Comprehensive metabolic panel   Result Value Ref Range    Glucose, Random 136 (H) 65 - 99 mg/dL    BUN 30 (H) 8 - 27 mg/dL    Creatinine 1 57 (H) 0 76 - 1 27 mg/dL    eGFR Non  41 (L) >59 mL/min/1 73    eGFR  47 (L) >59 mL/min/1 73    SL AMB BUN/CREATININE RATIO 19 10 - 24    Sodium 138 134 - 144 mmol/L    Potassium 4 5 3 5 - 5 2 mmol/L    Chloride 99 96 - 106 mmol/L    CO2 23 20 - 29 mmol/L    CALCIUM 9 4 8 6 - 10 2 mg/dL    Protein, Total 8 0 6 0 - 8 5 g/dL    Albumin 3 7 3 6 - 4 6 g/dL    Globulin, Total 4 3 1 5 - 4 5 g/dL    Albumin/Globulin Ratio 0 9 (L) 1 2 - 2 2    TOTAL BILIRUBIN 0 8 0 0 - 1 2 mg/dL    Alk Phos Isoenzymes 98 44 - 121 IU/L    AST 17 0 - 40 IU/L    ALT 16 0 - 44 IU/L   [employer] Kidney Stone Panel   Result Value Ref Range    Interpretation Note    Magnesium   Result Value Ref Range    Magnesium, Serum 2 7 (H) 1 6 - 2 3 mg/dL

## 2022-01-13 ENCOUNTER — OFFICE VISIT (OUTPATIENT)
Dept: PODIATRY | Facility: CLINIC | Age: 82
End: 2022-01-13
Payer: MEDICARE

## 2022-01-13 DIAGNOSIS — I70.209 PERIPHERAL ARTERIOSCLEROSIS (HCC): ICD-10-CM

## 2022-01-13 DIAGNOSIS — M77.42 METATARSALGIA OF BOTH FEET: Primary | ICD-10-CM

## 2022-01-13 DIAGNOSIS — B35.1 ONYCHOMYCOSIS: ICD-10-CM

## 2022-01-13 DIAGNOSIS — B35.9 DERMATOPHYTOSIS: ICD-10-CM

## 2022-01-13 DIAGNOSIS — M54.16 RADICULOPATHY OF LUMBAR REGION: ICD-10-CM

## 2022-01-13 DIAGNOSIS — I70.209 ATHEROSCLEROSIS OF ARTERIES OF EXTREMITIES (HCC): ICD-10-CM

## 2022-01-13 DIAGNOSIS — M77.41 METATARSALGIA OF BOTH FEET: Primary | ICD-10-CM

## 2022-01-13 PROCEDURE — 99213 OFFICE O/P EST LOW 20 MIN: CPT | Performed by: PODIATRIST

## 2022-01-13 NOTE — PROGRESS NOTES
Assessment/Plan:  Pain upon ambulation   Limb pain   Rule out radiculopathy   Edema secondary to venous insufficiency   Rule out degenerative disc disease   Probable spinal stenosis   Dermatophytosis   Mycosis of nail      Plan   Patient advised on condition   All nails debrided   Calluses debrided without pain or complication   Patient will follow up with pain management and acupuncture specialists   In addition all abnormal skin debrided without pain or complication       Subjective:  Patient complains of pain in his feet with ambulation   He has pain in his back and legs   He is doing well with acupuncture   He complains of pain with shoe wear   No history of trauma                 Past Medical History:   Diagnosis Date    Arthritis      Atrial flutter (HCC)      Chronic kidney disease       stage 3    Coronary artery disease       2 stents    Fluid retention      Gout      Heart failure (HCC)       pacemaker    Hypertension      Pacemaker      Pulmonary emphysema (HCC)      Radiculopathy       last assessed 1/28/16     Shortness of breath       exertion    Sleep apnea       c pap                   Past Surgical History:   Procedure Laterality Date    ANGIOPLASTY         x2 2 stents and then replaced    CARDIAC PACEMAKER PLACEMENT         pacemaker permanent placement dual chamber / last assessed 4/7/14 / implantation     CARDIAC SURGERY         pacemaker    CHOLECYSTECTOMY        CORONARY ANGIOPLASTY WITH STENT PLACEMENT        EPIDURAL BLOCK INJECTION N/A 5/26/2016     Procedure: BLOCK / INJECTION EPIDURAL STEROID LUMBAR  L4-5;  Surgeon: John No MD;  Location: [facility] MAIN OR;  Service:     EYE SURGERY         cataract left    KNEE ARTHROSCOPY W/ MENISCAL REPAIR Left      LUMBAR EPIDURAL INJECTION N/A 3/17/2016     Procedure: BLOCK / INJECTION LUMBAR  L4-5  (C-ARM);  Surgeon: John No MD;  Location: [facility] MAIN OR;  Service:          No Known Allergies        Current Outpatient Prescriptions:     albuterol (VENTOLIN HFA) 90 mcg/act inhaler, Inhale 2 puffs every 6 (six) hours as needed for wheezing, Disp: 1 Inhaler, Rfl: 6    allopurinol (ZYLOPRIM) 100 mg tablet, Take 1 tablet (100 mg total) by mouth daily, Disp: 90 tablet, Rfl: 3    ascorbic acid (VITAMIN C) 500 mg tablet, Take 500 mg by mouth daily  , Disp: , Rfl:     B Complex Vitamins (B COMPLEX 1 PO), Take 1 tablet by mouth daily  , Disp: , Rfl:     Biotin (BIOTIN 5000) 5 MG CAPS, Take 1,000 mcg by mouth daily  , Disp: , Rfl:     calcium carbonate-vitamin D (OSCAL-D) 500 mg-200 units per tablet, Take 2 tablets by mouth daily at bedtime  , Disp: , Rfl:     ciclopirox (LOPROX) 0 77 % cream, Apply topically 2 (two) times a day for 20 days, Disp: 45 g, Rfl: 2    clopidogrel (PLAVIX) 75 mg tablet, TAKE 1 TABLET DAILY, Disp: 90 tablet, Rfl: 3    collagenase (SANTYL) ointment, Apply 1 application topically daily  , Disp: , Rfl:     Cranberry 1000 MG CAPS, Take 1 tablet by mouth 2 (two) times a day , Disp: , Rfl:     cyanocobalamin 1000 MCG tablet, Take 100 mcg by mouth daily, Disp: , Rfl:     felodipine (PLENDIL) 5 mg 24 hr tablet, TAKE 1 TABLET DAILY, Disp: 90 tablet, Rfl: 3    finasteride (PROSCAR) 5 mg tablet, Take 1 tablet (5 mg total) by mouth daily, Disp: 90 tablet, Rfl: 3    Flaxseed, Linseed, (EQL FLAX SEED OIL) 1000 MG CAPS, Take by mouth daily  , Disp: , Rfl:     fluocinonide (LIDEX) 0 05 % cream, Apply topically 2 (two) times a day for 30 days, Disp: 30 g, Rfl: 2    fluticasone-salmeterol (ADVAIR) 250-50 mcg/dose inhaler, Inhale 1 puff 2 (two) times a day Rinse mouth after use , Disp: 1 Inhaler, Rfl: 6    furosemide (LASIX) 40 mg tablet, TAKE 1 TABLET BY MOUTH DAILY AS NEEDED FOR LEG EDEMA OR DYSNEA, Disp: 30 tablet, Rfl: 0    gabapentin (NEURONTIN) 300 mg capsule, TAKE 1 CAPSULE (300 MG TOTAL) BY MOUTH 2 (TWO) TIMES A DAY FOR 30 DAYS, Disp: 60 capsule, Rfl: 2    gabapentin (NEURONTIN) 300 mg capsule, Take 1 capsule (300 mg total) by mouth 3 (three) times a day for 30 days, Disp: 90 capsule, Rfl: 0    gabapentin (NEURONTIN) 600 MG tablet, Take 1 tablet (600 mg total) by mouth 3 (three) times a day for 30 days, Disp: 60 tablet, Rfl: 0    glucosamine-chondroitin 500-400 MG tablet, Take 1 tablet by mouth 2 (two) times a day , Disp: , Rfl:     hydrOXYzine HCL (ATARAX) 25 mg tablet, Take 1 tablet (25 mg total) by mouth 3 (three) times a day for 3 days, Disp: 9 tablet, Rfl: 0    lactase (LACTAID) 3,000 units tablet, Take 3,000 Units by mouth as needed  , Disp: , Rfl:     methylPREDNISolone 4 MG tablet therapy pack, Use as directed on package, Disp: 21 tablet, Rfl: 0    Omega-3 Fatty Acids (FISH OIL) 1,000 mg, Take 1,000 mg by mouth 2 (two) times a day , Disp: , Rfl:     oxyCODONE-acetaminophen (PERCOCET) 5-325 mg per tablet, 1 tablet to be taken 3 times daily as needed for leg pain, Disp: 30 tablet, Rfl: 0    PARoxetine (PAXIL) 20 mg tablet, TAKE 1/2 TABLET BY MOUTH DAILY, Disp: 30 tablet, Rfl: 0    psyllium (METAMUCIL) 58 6 % powder, Take 1 packet by mouth daily Indications: 1 tsp , Disp: , Rfl:     rivaroxaban (XARELTO) tablet, Take 20 mg by mouth , Disp: , Rfl:     simvastatin (ZOCOR) 20 mg tablet, Take 1 tablet (20 mg total) by mouth daily at bedtime, Disp: 90 tablet, Rfl: 3    traMADol (ULTRAM) 50 mg tablet, 1 tablet to be taken twice daily as needed for foot and leg pain, Disp: 30 tablet, Rfl: 0           Patient Active Problem List   Diagnosis    Chronic pain disorder    Bilateral lumbar radiculopathy    Lumbar canal stenosis    Acquired ankle/foot deformity    Pain in joints of both feet    Dermatophytosis    Atherosclerosis of arteries of extremities (HCC)    Pain in both feet    Onychomycosis    Neck muscle spasm    Anxiety 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    Benign hypertension with CKD (chronic kidney disease) stage III (HCC)    Bilateral leg edema    CKD (chronic kidney disease), stage III (HCC)  Microscopic hematuria    Proteinuria    Urinary frequency    Atrial fibrillation (HCC)    Benign prostatic hyperplasia    Congestive heart failure (HCC)    Arteriosclerosis of coronary artery    Allergic rhinitis    Aneurysm of abdominal aorta (HCC)    Angina pectoris (HCC)    Pain in joint involving ankle and foot    Atrial flutter (HCC)    Carpal tunnel syndrome    Chronic gout without tophus    Chronic low back pain    Chronic obstructive pulmonary disease (HCC)    Colon, diverticulosis    Deep venous insufficiency    Degenerative disc disease, lumbar    Difficulty in walking    Fecal soiling    Fibromyalgia    Fistula-in-ano    Hyperlipidemia    Insomnia, persistent    Memory loss    Moderate or severe vision impairment, one eye    Morbid obesity (HCC)    Nicotine dependence    Osteoarthritis of knee    Pacemaker    Sleep apnea    Urinary incontinence    Venous insufficiency (chronic) (peripheral)    BMI 45 0-49 9, adult (HCC)    Carbuncle of neck    Carbuncle of occipital region of scalp    Elevated glucose    Peripheral arteriosclerosis (HCC)    Radiculopathy of lumbar region    Metatarsalgia of both feet          Physical Exam  Left Foot: Appearance: Normal except as noted: excessive pronation-- and-- pes planus  Second toe deformities include hammer toe  Third toe deformities include hammer toe  Forth toe deformities include hammer toe  Fifth toe deformities include hammer toe  Tenderness: None except the great toe,-- distal first metatarsal,-- distal fifth metatarsal-- and-- insertion of the plantar fascia  Positive Brian sign third left intermetatarsal space  ROM: Full  Motor: Normal   Right Foot: Appearance: Normal except as noted: excessive pronation-- and-- pes planus  Second toe deformities include hammer toe  Third toe deformities include hammer toe  Forth toe deformities include hammer toe  Fifth toe deformities include hammer toe   Evaluation of the great toe nail demonstrates an ingrown nail  Tenderness: None except the insertion of the plantar fascia  Left Ankle: Appearance: Normal except erythema,-- swelling-- and-- swelling laterally  Tenderness: None except the tibiotalar joint  Right Ankle: ROM: Full  Neurological Exam: performed  Deep tendon reflexes: + tinel of right tibial nerve  Vascular Exam: performed Dorsalis pedis pulses were present bilaterally  Posterior tibial pulses were present bilaterally  Elevation Pallor: absent bilaterally  Capillary refill time was less than 1 second bilaterally  Edema: moderate bilaterally-- and-- 4/7 pitting  neg  buddy  Toenails: All of the toenails were elongated,-- hypertrophied,-- discolored,-- ingrown,-- shown to have subungual debris,-- tender-- and-- Mycotic with onychauxis  Hyperkeratosis: present on both first toes,-- present on both first sub metatarsals-- and-- present on both fifth sub metatarsals  Shoe Gear Evaluation: performed ()   Recommendation(s): extra depth diabetic shoes

## 2022-01-14 DIAGNOSIS — E79.0 HYPERURICEMIA: ICD-10-CM

## 2022-01-15 RX ORDER — ALLOPURINOL 100 MG/1
200 TABLET ORAL DAILY
Qty: 180 TABLET | Refills: 3 | Status: SHIPPED | OUTPATIENT
Start: 2022-01-15

## 2022-01-24 ENCOUNTER — OFFICE VISIT (OUTPATIENT)
Dept: OBGYN CLINIC | Facility: CLINIC | Age: 82
End: 2022-01-24
Payer: MEDICARE

## 2022-01-24 ENCOUNTER — TELEPHONE (OUTPATIENT)
Dept: PAIN MEDICINE | Facility: MEDICAL CENTER | Age: 82
End: 2022-01-24

## 2022-01-24 ENCOUNTER — APPOINTMENT (OUTPATIENT)
Dept: RADIOLOGY | Facility: CLINIC | Age: 82
End: 2022-01-24
Payer: MEDICARE

## 2022-01-24 VITALS
HEART RATE: 77 BPM | WEIGHT: 289 LBS | DIASTOLIC BLOOD PRESSURE: 69 MMHG | BODY MASS INDEX: 38.3 KG/M2 | SYSTOLIC BLOOD PRESSURE: 114 MMHG | HEIGHT: 73 IN

## 2022-01-24 DIAGNOSIS — M25.561 PAIN IN BOTH KNEES, UNSPECIFIED CHRONICITY: ICD-10-CM

## 2022-01-24 DIAGNOSIS — M17.12 OSTEOARTHRITIS OF LEFT KNEE, UNSPECIFIED OSTEOARTHRITIS TYPE: ICD-10-CM

## 2022-01-24 DIAGNOSIS — M25.562 PAIN IN BOTH KNEES, UNSPECIFIED CHRONICITY: ICD-10-CM

## 2022-01-24 DIAGNOSIS — M17.0 BILATERAL PRIMARY OSTEOARTHRITIS OF KNEE: ICD-10-CM

## 2022-01-24 DIAGNOSIS — M48.061 SPINAL STENOSIS OF LUMBAR REGION, UNSPECIFIED WHETHER NEUROGENIC CLAUDICATION PRESENT: Primary | ICD-10-CM

## 2022-01-24 PROCEDURE — 20610 DRAIN/INJ JOINT/BURSA W/O US: CPT | Performed by: ORTHOPAEDIC SURGERY

## 2022-01-24 PROCEDURE — 99214 OFFICE O/P EST MOD 30 MIN: CPT | Performed by: ORTHOPAEDIC SURGERY

## 2022-01-24 PROCEDURE — 73562 X-RAY EXAM OF KNEE 3: CPT

## 2022-01-24 RX ORDER — HYALURONATE SODIUM 10 MG/ML
20 SYRINGE (ML) INTRAARTICULAR
Status: COMPLETED | OUTPATIENT
Start: 2022-01-24 | End: 2022-01-24

## 2022-01-24 RX ORDER — DAPAGLIFLOZIN 5 MG/1
TABLET, FILM COATED ORAL
COMMUNITY
Start: 2022-01-13 | End: 2022-02-07 | Stop reason: HOSPADM

## 2022-01-24 RX ADMIN — Medication 20 MG: at 10:06

## 2022-01-24 NOTE — PROGRESS NOTES
Assessment/Plan:  1  Spinal stenosis of lumbar region, unspecified whether neurogenic claudication present  Ambulatory Referral to Pain Management   2  Bilateral primary osteoarthritis of knee     3  Pain in both knees, unspecified chronicity  XR knee 3 vw left non injury    XR knee 3 vw right non injury   4  Osteoarthritis of left knee, unspecified osteoarthritis type  Large joint arthrocentesis: L knee    Injection Procedure Prior Authorization       Scribe Attestation    I,:  Tashi Nuno am acting as a scribe while in the presence of the attending physician :       I,:  Gillian Gonzalez MD personally performed the services described in this documentation    as scribed in my presence :             Upon evaluation and review of lumbar spine CT from 2019 and the bilateral knee xrays taken today, I believe that [facility] is presenting with severe osteoarthritis of the bilateral knees as well as spinal stenosis of the lumbar spine  I discussed with Steven that I believe that his bilateral lower leg symptoms are arising from his lumbar spine osteoarthritis and spinal stenosis  Because of this, I discussed with Steven that I believe that he could potentially benefit from a epidural steroid injection for his lumbar spinal stenosis  I provided him with a referral to Dr Ander Chavez of pain management today for an epidural steroid injection  I discussed with [facility] that for his left knee, I could provide him with either a Euflexxa injection or a steroid injection  Since [facility] experienced improvement with a Euflexxa injection previously, I recommend that he attempt this again today  I provided [facility] with left knee Euflexxa #1 injection today which he received and tolerated well  [facility] will follow up with Dr Ander Chavez for his lumbar spine epidural injection and he will  follow up with my physician assistant Jean Campuzano in 1 week for Euflexxa injection #2       Large joint arthrocentesis: L knee  Universal Protocol:  Consent: Verbal consent obtained  Risks and benefits: risks, benefits and alternatives were discussed  Consent given by: patient  Time out: Immediately prior to procedure a "time out" was called to verify the correct patient, procedure, equipment, support staff and site/side marked as required  Patient understanding: patient states understanding of the procedure being performed  Site marked: the operative site was marked  Patient identity confirmed: verbally with patient    Supporting Documentation  Indications: pain   Procedure Details  Location: knee - L knee  Preparation: Patient was prepped and draped in the usual sterile fashion  Needle size: 22 G  Ultrasound guidance: no  Approach: anterolateral  Medications administered: 20 mg Sodium Hyaluronate 20 MG/2ML    Patient tolerance: patient tolerated the procedure well with no immediate complications  Dressing:  Sterile dressing applied          Subjective:   Duke Cook is a 80 y o  male who presents to the office today for evaluation of his bilateral knees and lower legs  Rodrimao Rajan states that he has been experiencing chronic lower back pain as well as bilateral lower extremity and lower leg numbness  Rodri Satinder states that he was seen by Dr Angeline Jimenes of podiatry for evaluation of his lower legs and that Dr Angeline Jimenes believes that his symptoms are occurring due to his lumbar spine degenerative disc disease  His PCP Dr Heidi Shepherd prescribed him gabapentin at 900mg which he reports has not improved his symptoms  Rodri Rajan also has a past history of operative intervention on the left knee in the form of a patella fracture fixation that he underwent in the 1970s  Rodri Rajan states that he has experienced some recurrent pain about his left knee and would like to consider repeat hyaluronic acid injections for his left knee since it provided him relief previously in 2018 when he last received them        Review of Systems   Constitutional: Negative for chills and fever    HENT: Negative for ear pain and sore throat  Eyes: Negative for pain and visual disturbance  Respiratory: Negative for cough and shortness of breath  Cardiovascular: Negative for chest pain and palpitations  Gastrointestinal: Negative for abdominal pain and vomiting  Genitourinary: Negative for dysuria and hematuria  Musculoskeletal: Positive for arthralgias and back pain  Skin: Negative for color change and rash  Neurological: Negative for seizures and syncope  All other systems reviewed and are negative  Past Medical History:   Diagnosis Date    Arthritis     Atrial flutter ([facility] [address] [facility]     Chronic kidney disease     stage 3    Coronary artery disease     2 stents    Fluid retention     Gout     Heart failure (HCC)     pacemaker    Hypertension     Pacemaker     Pulmonary emphysema ([facility] [address] [facility]     Radiculopathy     last assessed 1/28/16     Shortness of breath     exertion    Sleep apnea     c pap       Past Surgical History:   Procedure Laterality Date    ANGIOPLASTY      x2 2 stents and then replaced    CARDIAC PACEMAKER PLACEMENT      pacemaker permanent placement dual chamber / last assessed 4/7/14 / implantation     CARDIAC SURGERY      pacemaker    CHOLECYSTECTOMY      CORONARY ANGIOPLASTY WITH STENT PLACEMENT      EPIDURAL BLOCK INJECTION N/A 5/26/2016    Procedure: BLOCK / INJECTION EPIDURAL STEROID LUMBAR  L4-5;  Surgeon: Vincent Klinefelter, MD;  Location: [facility] MAIN OR;  Service:     EPIDURAL BLOCK INJECTION N/A 2/14/2019    Procedure: L4 L5 Lumbar Epidural Steroid Injection;  Surgeon: Vincent Klinefelter, MD;  Location: [facility] MAIN OR;  Service: Pain Management     EYE SURGERY      cataract left    KNEE ARTHROSCOPY W/ MENISCAL REPAIR Left     LUMBAR EPIDURAL INJECTION N/A 3/17/2016    Procedure: BLOCK / INJECTION LUMBAR  L4-5  (C-ARM);   Surgeon: Vincent Klinefelter, MD;  Location[facility] MA[facility] OR;  Service:        Family History   Problem Relation Age of Onset    Cancer Mother 80    Heart disease Mother     Hypertension Mother     Heart disease Father     Diabetes Neg Hx     Stroke Neg Hx        Social History     Occupational History    Occupation: RETIRED   Tobacco Use    Smoking status: Former Smoker     Packs/day: 1 00     Years: 50 00     Pack years: 50 00     Types: Cigarettes     Quit date: 3/27/2019     Years since quittin 8    Smokeless tobacco: Never Used    Tobacco comment: quit 2021   Vaping Use    Vaping Use: Never used   Substance and Sexual Activity    Alcohol use: Never    Drug use: Never    Sexual activity: Not on file         Current Outpatient Medications:     albuterol (Ventolin HFA) 90 mcg/act inhaler, Inhale 2 puffs every 6 (six) hours as needed for wheezing, Disp: 3 Inhaler, Rfl: 3    allopurinol (ZYLOPRIM) 100 mg tablet, Take 2 tablets (200 mg total) by mouth daily, Disp: 180 tablet, Rfl: 3    ascorbic acid (VITAMIN C) 500 mg tablet, Take 500 mg by mouth daily  , Disp: , Rfl:     B Complex Vitamins (B COMPLEX 1 PO), Take 1 tablet by mouth daily  , Disp: , Rfl:     Biotin (BIOTIN 5000) 5 MG CAPS, Take 1,000 mcg by mouth daily  , Disp: , Rfl:     calcium carbonate-vitamin D (OSCAL-D) 500 mg-200 units per tablet, Take 2 tablets by mouth daily at bedtime  , Disp: , Rfl:     cholecalciferol (VITAMIN D3) 1,000 units tablet, Take 1,000 Units by mouth daily, Disp: , Rfl:     clopidogrel (PLAVIX) 75 mg tablet, Take 1 tablet (75 mg total) by mouth daily, Disp: 90 tablet, Rfl: 1    colchicine (COLCRYS) 0 6 mg tablet, Take 1 tablet (0 6 mg total) by mouth 2 (two) times a day Take twice daily x 7 days as needed for gout (Patient taking differently: Take 0 6 mg by mouth Take twice daily x 7 days as needed for gout ), Disp: 30 tablet, Rfl: 5    Cranberry 1000 MG CAPS, Take 1 tablet by mouth 2 (two) times a day , Disp: , Rfl:     cyanocobalamin 1000 MCG tablet, Take 100 mcg by mouth daily, Disp: , Rfl:     dextromethorphan-guaiFENesin (ROBITUSSIN DM)  mg/5 mL syrup, Take 10 mL by mouth every 4 (four) hours as needed for cough, Disp: 118 mL, Rfl: 0    docusate sodium (COLACE) 100 mg capsule, Take 3 capsules (300 mg total) by mouth daily (Patient taking differently: Take 300 mg by mouth as needed  ), Disp: 90 capsule, Rfl: 5    DULoxetine (CYMBALTA) 60 mg delayed release capsule, Take 1 capsule (60 mg total) by mouth daily, Disp: 90 capsule, Rfl: 1    Farxiga 5 MG TABS, , Disp: , Rfl:     felodipine (PLENDIL) 5 mg 24 hr tablet, Take 1 tablet (5 mg total) by mouth daily, Disp: 90 tablet, Rfl: 1    finasteride (PROSCAR) 5 mg tablet, Take 1 tablet (5 mg total) by mouth daily, Disp: 90 tablet, Rfl: 3    Flaxseed, Linseed, (EQL FLAX SEED OIL) 1000 MG CAPS, Take by mouth daily  , Disp: , Rfl:     fluticasone-umeclidinium-vilanterol (Trelegy Ellipta) 200-62 5-25 MCG/INH AEPB inhaler, Inhale 1 puff daily Rinse mouth after use , Disp: 180 blister, Rfl: 3    furosemide (LASIX) 40 mg tablet, Take 1 tablet (40 mg total) by mouth 2 (two) times a day, Disp: 60 tablet, Rfl: 0    gabapentin (Neurontin) 300 mg capsule, 300mg in am, 600mg in pm, Disp: 270 capsule, Rfl: 1    glucosamine-chondroitin 500-400 MG tablet, Take 1 tablet by mouth 2 (two) times a day , Disp: , Rfl:     nicotine (NICODERM CQ) 7 mg/24hr TD 24 hr patch, Place 1 patch on the skin daily, Disp: 28 patch, Rfl: 0    rivaroxaban (XARELTO) 15 mg tablet, Take 1 tablet (15 mg total) by mouth daily with breakfast (Patient taking differently: Take 20 mg by mouth daily at bedtime ), Disp: , Rfl:     simvastatin (ZOCOR) 20 mg tablet, Take 1 tablet (20 mg total) by mouth daily at bedtime, Disp: 90 tablet, Rfl: 1    spironolactone (ALDACTONE) 25 mg tablet, 25 mg daily  , Disp: , Rfl:     fluocinonide (LIDEX) 0 05 % cream, Apply topically 2 (two) times a day Prn leg rash, short term (Patient taking differently: Apply topically as needed Prn leg rash, short term), Disp: 120 g, Rfl: 2    No Known Allergies    Objective:  Vitals:    01/24/22 0852   BP: 114/69   Pulse: 77       Right Knee Exam     Tenderness   The patient is experiencing no tenderness  Range of Motion   Extension: -10   Flexion: 100     Tests   Varus: negative Valgus: negative  Drawer:  Anterior - negative    Posterior - negative      Left Knee Exam     Tenderness   The patient is experiencing no tenderness  Range of Motion   Extension: -10   Flexion: 100     Tests   Varus: negative Valgus: negative  Drawer:  Anterior - negative     Posterior - negative            Physical Exam  HENT:      Head: Normocephalic and atraumatic  Eyes:      General:         Right eye: No discharge  Left eye: No discharge  Extraocular Movements: Extraocular movements intact  Conjunctiva/sclera: Conjunctivae normal    Cardiovascular:      Rate and Rhythm: Normal rate  Pulmonary:      Effort: Pulmonary effort is normal  No respiratory distress  Musculoskeletal:      Cervical back: Normal range of motion and neck supple  Skin:     General: Skin is warm and dry  Neurological:      Mental Status: He is alert and oriented to person, place, and time  Psychiatric:         Mood and Affect: Mood normal          Behavior: Behavior normal            I have personally reviewed pertinent films in PACS and my interpretation is as follows:    Review of the bilateral knee xrays taken today demonstrate severe tricompartmental osteoarthritis about the bilateral knees  Review of the lumbar spine CT performed on 1/29/2019 demonstrates degenerative disc disease and spinal stenosis from the L2-L3 through L5-S1 levels

## 2022-01-24 NOTE — TELEPHONE ENCOUNTER
RN s/w pt regarding previous  Pt last seen per [facility] on 8/27/19  Pt stated that he does have the same pain that he last had when in to see [facility]  Per pt his back and knee pain are both intermittent when he gets up no pain, after doing anything like shoveling snow or working on his car or even standing in the shower he gets pain up to a 7-8/10 and needs to sit down  Per pt his pain goes away after sitting  Pt also doing acupuncture like he did a few years ago  Per pt he had wonderful success  a few years ago with same  Pt is on Plavix  --please advise thank you--  Sovs with [facility]?   Office note per Dr Corrinne Pates in [facility]

## 2022-01-24 NOTE — TELEPHONE ENCOUNTER
It has been a while since I have seen him so I think the best course of action is that he sees either myself or Wilmer Domínguez

## 2022-01-26 NOTE — TELEPHONE ENCOUNTER
Attempted to reach pt  S/w pt's wife who answered and asked if pt could call back, he is outside now   CB# provided

## 2022-01-27 ENCOUNTER — PATIENT OUTREACH (OUTPATIENT)
Dept: CASE MANAGEMENT | Facility: HOSPITAL | Age: 82
End: 2022-01-27

## 2022-01-27 NOTE — PROGRESS NOTES
Heart Failure Outpatient Care Coordinator Note: Chart notes reviewed and call made to patient's home and spoke with his wife Bailey Mckeon (patient was out)  Bailey Mckeon reports patient is doing well- stable weights within a pound or two, no increased SOB or edema  He is weighing daily and recording and "mostly" compliant with his diet though does go out weekly with his friends to eat  He has ongoing knee and back pain and is getting injections for his knees and will see Spine/pain for possible injection next month  He also is getting acupuncture which "helps"  No concerns or needs  Discussed considering joining the virtual HF support group and flyer mailed

## 2022-01-29 ENCOUNTER — APPOINTMENT (EMERGENCY)
Dept: RADIOLOGY | Facility: HOSPITAL | Age: 82
DRG: 480 | End: 2022-01-29
Payer: MEDICARE

## 2022-01-29 ENCOUNTER — HOSPITAL ENCOUNTER (INPATIENT)
Facility: HOSPITAL | Age: 82
LOS: 9 days | Discharge: NURSING, REHAB OR LONG-TERM CARE | DRG: 480 | End: 2022-02-07
Attending: EMERGENCY MEDICINE | Admitting: SURGERY
Payer: MEDICARE

## 2022-01-29 ENCOUNTER — APPOINTMENT (INPATIENT)
Dept: RADIOLOGY | Facility: HOSPITAL | Age: 82
DRG: 480 | End: 2022-01-29
Payer: MEDICARE

## 2022-01-29 DIAGNOSIS — N18.31 STAGE 3A CHRONIC KIDNEY DISEASE (HCC): ICD-10-CM

## 2022-01-29 DIAGNOSIS — S50.01XA CONTUSION OF RIGHT ELBOW, INITIAL ENCOUNTER: ICD-10-CM

## 2022-01-29 DIAGNOSIS — Z98.890 STATUS POST SURGERY: ICD-10-CM

## 2022-01-29 DIAGNOSIS — R06.00 DYSPNEA ON EXERTION: ICD-10-CM
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
## 2022-01-29 DIAGNOSIS — S72.143A INTERTROCHANTERIC FRACTURE (HCC): ICD-10-CM

## 2022-01-29 DIAGNOSIS — S50.311A ABRASION OF RIGHT ELBOW, INITIAL ENCOUNTER: ICD-10-CM

## 2022-01-29 DIAGNOSIS — S72.91XA CLOSED FRACTURE OF RIGHT FEMUR, UNSPECIFIED FRACTURE MORPHOLOGY, INITIAL ENCOUNTER (HCC): Primary | ICD-10-CM

## 2022-01-29 DIAGNOSIS — I50.33 ACUTE ON CHRONIC DIASTOLIC HEART FAILURE (HCC): ICD-10-CM

## 2022-01-29 DIAGNOSIS — I25.10 CORONARY ARTERY DISEASE INVOLVING NATIVE CORONARY ARTERY OF NATIVE HEART WITHOUT ANGINA PECTORIS: ICD-10-CM

## 2022-01-29 LAB
ABO GROUP BLD: NORMAL
ABO GROUP BLD: NORMAL
ANION GAP SERPL CALCULATED.3IONS-SCNC: 7 MMOL/L (ref 4–13)
ATRIAL RATE: 312 BPM
BASOPHILS # BLD AUTO: 0.06 THOUSANDS/ΜL (ref 0–0.1)
BASOPHILS NFR BLD AUTO: 1 % (ref 0–1)
BLD GP AB SCN SERPL QL: NEGATIVE
BUN SERPL-MCNC: 38 MG/DL (ref 5–25)
CALCIUM SERPL-MCNC: 9.3 MG/DL (ref 8.3–10.1)
CHLORIDE SERPL-SCNC: 100 MMOL/L (ref 100–108)
CO2 SERPL-SCNC: 28 MMOL/L (ref 21–32)
CREAT SERPL-MCNC: 1.7 MG/DL (ref 0.6–1.3)
EOSINOPHIL # BLD AUTO: 0.21 THOUSAND/ΜL (ref 0–0.61)
EOSINOPHIL NFR BLD AUTO: 2 % (ref 0–6)
ERYTHROCYTE [DISTWIDTH] IN BLOOD BY AUTOMATED COUNT: 16.7 % (ref 11.6–15.1)
GFR SERPL CREATININE-BSD FRML MDRD: 36 ML/MIN/1.73SQ M
GLUCOSE SERPL-MCNC: 163 MG/DL (ref 65–140)
HCT VFR BLD AUTO: 33.7 % (ref 36.5–49.3)
HGB BLD-MCNC: 9.9 G/DL (ref 12–17)
IMM GRANULOCYTES # BLD AUTO: 0.1 THOUSAND/UL (ref 0–0.2)
IMM GRANULOCYTES NFR BLD AUTO: 1 % (ref 0–2)
LYMPHOCYTES # BLD AUTO: 1.02 THOUSANDS/ΜL (ref 0.6–4.47)
LYMPHOCYTES NFR BLD AUTO: 11 % (ref 14–44)
MCH RBC QN AUTO: 24.8 PG (ref 26.8–34.3)
MCHC RBC AUTO-ENTMCNC: 29.4 G/DL (ref 31.4–37.4)
MCV RBC AUTO: 84 FL (ref 82–98)
MONOCYTES # BLD AUTO: 0.52 THOUSAND/ΜL (ref 0.17–1.22)
MONOCYTES NFR BLD AUTO: 6 % (ref 4–12)
NEUTROPHILS # BLD AUTO: 7.29 THOUSANDS/ΜL (ref 1.85–7.62)
NEUTS SEG NFR BLD AUTO: 79 % (ref 43–75)
NRBC BLD AUTO-RTO: 0 /100 WBCS
PLATELET # BLD AUTO: 238 THOUSANDS/UL (ref 149–390)
PMV BLD AUTO: 8.8 FL (ref 8.9–12.7)
POTASSIUM SERPL-SCNC: 3.8 MMOL/L (ref 3.5–5.3)
QRS AXIS: -77 DEGREES
QRSD INTERVAL: 168 MS
QT INTERVAL: 504 MS
QTC INTERVAL: 544 MS
RBC # BLD AUTO: 4 MILLION/UL (ref 3.88–5.62)
RH BLD: POSITIVE
RH BLD: POSITIVE
SODIUM SERPL-SCNC: 135 MMOL/L (ref 136–145)
SPECIMEN EXPIRATION DATE: NORMAL
T WAVE AXIS: 96 DEGREES
VENTRICULAR RATE: 70 BPM
WBC # BLD AUTO: 9.2 THOUSAND/UL (ref 4.31–10.16)

## 2022-01-29 PROCEDURE — 99285 EMERGENCY DEPT VISIT HI MDM: CPT

## 2022-01-29 PROCEDURE — 99222 1ST HOSP IP/OBS MODERATE 55: CPT | Performed by: SURGERY

## 2022-01-29 PROCEDURE — 73552 X-RAY EXAM OF FEMUR 2/>: CPT

## 2022-01-29 PROCEDURE — 72072 X-RAY EXAM THORAC SPINE 3VWS: CPT

## 2022-01-29 PROCEDURE — 93010 ELECTROCARDIOGRAM REPORT: CPT | Performed by: INTERNAL MEDICINE

## 2022-01-29 PROCEDURE — 85025 COMPLETE CBC W/AUTO DIFF WBC: CPT | Performed by: EMERGENCY MEDICINE

## 2022-01-29 PROCEDURE — 96376 TX/PRO/DX INJ SAME DRUG ADON: CPT

## 2022-01-29 PROCEDURE — 86900 BLOOD TYPING SEROLOGIC ABO: CPT | Performed by: EMERGENCY MEDICINE

## 2022-01-29 PROCEDURE — 36415 COLL VENOUS BLD VENIPUNCTURE: CPT | Performed by: EMERGENCY MEDICINE

## 2022-01-29 PROCEDURE — 80048 BASIC METABOLIC PNL TOTAL CA: CPT | Performed by: EMERGENCY MEDICINE

## 2022-01-29 PROCEDURE — 71045 X-RAY EXAM CHEST 1 VIEW: CPT

## 2022-01-29 PROCEDURE — 72100 X-RAY EXAM L-S SPINE 2/3 VWS: CPT

## 2022-01-29 PROCEDURE — 93005 ELECTROCARDIOGRAM TRACING: CPT

## 2022-01-29 PROCEDURE — 96374 THER/PROPH/DIAG INJ IV PUSH: CPT

## 2022-01-29 PROCEDURE — 72170 X-RAY EXAM OF PELVIS: CPT

## 2022-01-29 PROCEDURE — 86850 RBC ANTIBODY SCREEN: CPT | Performed by: EMERGENCY MEDICINE

## 2022-01-29 PROCEDURE — 86901 BLOOD TYPING SEROLOGIC RH(D): CPT | Performed by: EMERGENCY MEDICINE

## 2022-01-29 PROCEDURE — 99285 EMERGENCY DEPT VISIT HI MDM: CPT | Performed by: EMERGENCY MEDICINE

## 2022-01-29 RX ORDER — CEFAZOLIN SODIUM 2 G/50ML
2000 SOLUTION INTRAVENOUS ONCE
Status: COMPLETED | OUTPATIENT
Start: 2022-01-29 | End: 2022-01-29

## 2022-01-29 RX ORDER — HEPARIN SODIUM 5000 [USP'U]/ML
5000 INJECTION, SOLUTION INTRAVENOUS; SUBCUTANEOUS EVERY 8 HOURS SCHEDULED
Status: DISCONTINUED | OUTPATIENT
Start: 2022-01-29 | End: 2022-02-02

## 2022-01-29 RX ORDER — OXYCODONE HYDROCHLORIDE 5 MG/1
5 TABLET ORAL EVERY 4 HOURS PRN
Status: DISCONTINUED | OUTPATIENT
Start: 2022-01-29 | End: 2022-02-07 | Stop reason: HOSPADM

## 2022-01-29 RX ORDER — HYDROMORPHONE HCL/PF 1 MG/ML
0.2 SYRINGE (ML) INJECTION EVERY 4 HOURS PRN
Status: DISCONTINUED | OUTPATIENT
Start: 2022-01-29 | End: 2022-02-02

## 2022-01-29 RX ORDER — MORPHINE SULFATE 4 MG/ML
4 INJECTION, SOLUTION INTRAMUSCULAR; INTRAVENOUS ONCE
Status: COMPLETED | OUTPATIENT
Start: 2022-01-29 | End: 2022-01-29

## 2022-01-29 RX ORDER — ASCORBIC ACID 500 MG
500 TABLET ORAL DAILY
Status: DISCONTINUED | OUTPATIENT
Start: 2022-01-29 | End: 2022-02-07 | Stop reason: HOSPADM

## 2022-01-29 RX ORDER — ONDANSETRON 2 MG/ML
4 INJECTION INTRAMUSCULAR; INTRAVENOUS EVERY 4 HOURS PRN
Status: DISCONTINUED | OUTPATIENT
Start: 2022-01-29 | End: 2022-01-29

## 2022-01-29 RX ORDER — OXYCODONE HYDROCHLORIDE 5 MG/1
2.5 TABLET ORAL EVERY 4 HOURS PRN
Status: DISCONTINUED | OUTPATIENT
Start: 2022-01-29 | End: 2022-02-07 | Stop reason: HOSPADM

## 2022-01-29 RX ORDER — POLYETHYLENE GLYCOL 3350 17 G/17G
17 POWDER, FOR SOLUTION ORAL DAILY PRN
Status: DISCONTINUED | OUTPATIENT
Start: 2022-01-29 | End: 2022-02-02

## 2022-01-29 RX ORDER — MORPHINE SULFATE 10 MG/ML
8 INJECTION, SOLUTION INTRAMUSCULAR; INTRAVENOUS ONCE
Status: COMPLETED | OUTPATIENT
Start: 2022-01-29 | End: 2022-01-29

## 2022-01-29 RX ORDER — FELODIPINE 2.5 MG/1
5 TABLET, EXTENDED RELEASE ORAL DAILY
Status: DISCONTINUED | OUTPATIENT
Start: 2022-01-29 | End: 2022-01-30

## 2022-01-29 RX ORDER — SODIUM PHOSPHATE,MONO-DIBASIC 19G-7G/118
500 ENEMA (ML) RECTAL 2 TIMES DAILY
Status: DISCONTINUED | OUTPATIENT
Start: 2022-01-29 | End: 2022-02-07 | Stop reason: HOSPADM

## 2022-01-29 RX ORDER — B-COMPLEX WITH VITAMIN C
2 TABLET ORAL
Status: DISCONTINUED | OUTPATIENT
Start: 2022-01-29 | End: 2022-02-07 | Stop reason: HOSPADM

## 2022-01-29 RX ORDER — ALBUTEROL SULFATE 90 UG/1
2 AEROSOL, METERED RESPIRATORY (INHALATION) EVERY 6 HOURS PRN
Status: DISCONTINUED | OUTPATIENT
Start: 2022-01-29 | End: 2022-02-07 | Stop reason: HOSPADM

## 2022-01-29 RX ORDER — FINASTERIDE 5 MG/1
5 TABLET, FILM COATED ORAL DAILY
Status: DISCONTINUED | OUTPATIENT
Start: 2022-01-29 | End: 2022-02-07 | Stop reason: HOSPADM

## 2022-01-29 RX ORDER — DULOXETIN HYDROCHLORIDE 60 MG/1
60 CAPSULE, DELAYED RELEASE ORAL DAILY
Status: DISCONTINUED | OUTPATIENT
Start: 2022-01-29 | End: 2022-02-07 | Stop reason: HOSPADM

## 2022-01-29 RX ORDER — LANOLIN ALCOHOL/MO/W.PET/CERES
1 CREAM (GRAM) TOPICAL 2 TIMES DAILY
Status: DISCONTINUED | OUTPATIENT
Start: 2022-01-29 | End: 2022-01-29

## 2022-01-29 RX ORDER — MELATONIN
1000 DAILY
Status: DISCONTINUED | OUTPATIENT
Start: 2022-01-29 | End: 2022-02-07 | Stop reason: HOSPADM

## 2022-01-29 RX ORDER — ACETAMINOPHEN 325 MG/1
975 TABLET ORAL EVERY 8 HOURS SCHEDULED
Status: DISCONTINUED | OUTPATIENT
Start: 2022-01-29 | End: 2022-02-07 | Stop reason: HOSPADM

## 2022-01-29 RX ORDER — FLUTICASONE FUROATE AND VILANTEROL 200; 25 UG/1; UG/1
1 POWDER RESPIRATORY (INHALATION) DAILY
Status: DISCONTINUED | OUTPATIENT
Start: 2022-01-29 | End: 2022-02-07 | Stop reason: HOSPADM

## 2022-01-29 RX ORDER — ALLOPURINOL 100 MG/1
200 TABLET ORAL DAILY
Status: DISCONTINUED | OUTPATIENT
Start: 2022-01-29 | End: 2022-02-07 | Stop reason: HOSPADM

## 2022-01-29 RX ORDER — AMOXICILLIN 250 MG
1 CAPSULE ORAL 2 TIMES DAILY
Status: DISCONTINUED | OUTPATIENT
Start: 2022-01-29 | End: 2022-02-07 | Stop reason: HOSPADM

## 2022-01-29 RX ORDER — PRAVASTATIN SODIUM 40 MG
40 TABLET ORAL
Status: DISCONTINUED | OUTPATIENT
Start: 2022-01-29 | End: 2022-02-07 | Stop reason: HOSPADM

## 2022-01-29 RX ORDER — GABAPENTIN 300 MG/1
300 CAPSULE ORAL 2 TIMES DAILY
Status: DISCONTINUED | OUTPATIENT
Start: 2022-01-29 | End: 2022-02-04

## 2022-01-29 RX ORDER — GABAPENTIN 300 MG/1
300 CAPSULE ORAL DAILY
Status: DISCONTINUED | OUTPATIENT
Start: 2022-01-29 | End: 2022-01-30

## 2022-01-29 RX ORDER — LIDOCAINE 50 MG/G
1 PATCH TOPICAL DAILY
Status: COMPLETED | OUTPATIENT
Start: 2022-01-29 | End: 2022-01-30

## 2022-01-29 RX ADMIN — MORPHINE SULFATE 4 MG: 4 INJECTION INTRAVENOUS at 11:06

## 2022-01-29 RX ADMIN — OXYCODONE HYDROCHLORIDE AND ACETAMINOPHEN 500 MG: 500 TABLET ORAL at 14:53

## 2022-01-29 RX ADMIN — MORPHINE SULFATE 4 MG: 4 INJECTION INTRAVENOUS at 10:16

## 2022-01-29 RX ADMIN — HEPARIN SODIUM 5000 UNITS: 5000 INJECTION INTRAVENOUS; SUBCUTANEOUS at 23:17

## 2022-01-29 RX ADMIN — GABAPENTIN 300 MG: 300 CAPSULE ORAL at 18:06

## 2022-01-29 RX ADMIN — PRAVASTATIN SODIUM 40 MG: 40 TABLET ORAL at 18:07

## 2022-01-29 RX ADMIN — FLUTICASONE FUROATE AND VILANTEROL TRIFENATATE 1 PUFF: 200; 25 POWDER RESPIRATORY (INHALATION) at 14:47

## 2022-01-29 RX ADMIN — HEPARIN SODIUM 5000 UNITS: 5000 INJECTION INTRAVENOUS; SUBCUTANEOUS at 15:16

## 2022-01-29 RX ADMIN — ALLOPURINOL 200 MG: 100 TABLET ORAL at 14:46

## 2022-01-29 RX ADMIN — SENNOSIDES AND DOCUSATE SODIUM 1 TABLET: 50; 8.6 TABLET ORAL at 18:06

## 2022-01-29 RX ADMIN — FINASTERIDE 5 MG: 5 TABLET, FILM COATED ORAL at 14:46

## 2022-01-29 RX ADMIN — LIDOCAINE 5% 1 PATCH: 700 PATCH TOPICAL at 14:54

## 2022-01-29 RX ADMIN — Medication 500 MG: at 18:07

## 2022-01-29 RX ADMIN — ACETAMINOPHEN 975 MG: 325 TABLET, FILM COATED ORAL at 23:17

## 2022-01-29 RX ADMIN — Medication 100 MCG: at 14:47

## 2022-01-29 RX ADMIN — FELODIPINE 5 MG: 2.5 TABLET, FILM COATED, EXTENDED RELEASE ORAL at 14:47

## 2022-01-29 RX ADMIN — DULOXETINE HYDROCHLORIDE 60 MG: 60 CAPSULE, DELAYED RELEASE ORAL at 14:46

## 2022-01-29 RX ADMIN — Medication 1000 UNITS: at 14:53

## 2022-01-29 RX ADMIN — OXYCODONE HYDROCHLORIDE 5 MG: 5 TABLET ORAL at 23:17

## 2022-01-29 RX ADMIN — CEFAZOLIN SODIUM 2000 MG: 2 SOLUTION INTRAVENOUS at 14:11

## 2022-01-29 RX ADMIN — MORPHINE SULFATE 8 MG: 10 INJECTION INTRAVENOUS at 13:22

## 2022-01-29 RX ADMIN — ACETAMINOPHEN 975 MG: 325 TABLET, FILM COATED ORAL at 14:53

## 2022-01-29 NOTE — ED PROCEDURE NOTE
PROCEDURE  ECG 12 Lead Documentation Only    Date/Time: 1/29/2022 12:21 PM  Performed by: Priya Ding MD  Authorized by: Priya Ding MD     Indications / Diagnosis:  ADMIT-=   ECG reviewed by me, the ED Provider: yes    Patient location:  ED  Previous ECG:     Previous ECG:  Compared to current    Comparison ECG info:  11/21/21    Similarity:  No change    Comparison to cardiac monitor: Yes    Interpretation:     Interpretation: non-specific    Rate:     ECG rate:  70    ECG rate assessment: normal    Rhythm:     Rhythm: paced    Pacing:     Capture:  Complete    Type of pacing:  Ventricular  Ectopy:     Ectopy: none    QRS:     QRS axis:  Left    QRS intervals:   Wide  Conduction:     Conduction: abnormal      Abnormal conduction: non-specific intraventricular conduction delay    ST segments:     ST segments:  Normal  T waves:     T waves: inverted      Inverted:  I and aVL  Q waves:     Q waves:  II, III, aVF, V3, V4, V5 and V6  Other findings:     Other findings: LVH and prolonged qTc interval    Comments:      APPROPRIATE DISCONCORDANCE BETWEEN QRS AND ST SEGMENTS          Priya Ding MD  01/29/22 1172

## 2022-01-29 NOTE — ASSESSMENT & PLAN NOTE
- Chronic history of atrial fibrillation   - Patient normally anticoagulated with Xarelto  Will hold anticoagulant therapy in anticipation of orthopedic operative intervention   - Plan to resume anticoagulation postoperatively  - Monitor for adequate heart rate control   - Continue home medication regimen as appropriate otherwise    - Await Cardiology evaluation and recommendations   - Outpatient follow-up per team

## 2022-01-29 NOTE — ASSESSMENT & PLAN NOTE
- Status post mechanical fall on ice with the below noted injuries and issues  - Fall precautions  - Geriatric Medicine consultation for evaluation, medication review and recommendations   - PT and OT evaluation and treatment as indicated  - Case Management consultation for disposition planning

## 2022-01-29 NOTE — H&P
[address]  1940, 80 y o  male MRN: [id number]  Unit/Bed#: [place] [address] Encounter: [id number]  Primary Care Provider: Damian Jones DO   Date and time admitted to hospital: 1/29/2022  9:34 AM    Fall  Assessment & Plan  - Status post mechanical fall on ice with the below noted injuries and issues  - Fall precautions  - Geriatric Medicine consultation for evaluation, medication review and recommendations   - PT and OT evaluation and treatment as indicated  - Case Management consultation for disposition planning  * Closed fracture of right femur [facility])  Assessment & Plan  - Acute right intertrochanteric femur fracture, present on admission   - Await Orthopedic surgery evaluation and recommendations  Anticipate likely operative intervention  In anticipation of operative intervention, will consult Cardiology for perioperative risk assessment and assistance with management in setting of significant baseline heart disease; additionally, will consult Nephrology due to known CKD 3 with mild SHAWNEE, present on presentation, to assist with perioperative renal management  Also, plan to hold anti-platelet and anticoagulant therapy; patient notes he has not taken Xarelto since yesterday  - Maintain non-weightbearing status on the right lower extremity   - Monitor right lower extremity neurovascular exam   - Continue multimodal analgesic regimen  - Initiate chemical DVT prophylaxis  - PT and OT evaluation and treatment as indicated      Stage 3a chronic kidney disease [facility])  Assessment & Plan  Lab Results   Component Value Date    EGFR 36 01/29/2022    EGFR 45 11/24/2021    EGFR 44 11/23/2021    CREATININE 1 70 (H) 01/29/2022    CREATININE 1 57 (H) 01/05/2022    CREATININE 1 44 (H) 11/24/2021     - Patient with chronic history of CKD stage 3 with baseline creatinine appearing to be between 1 4 and 1 5   - Mild elevation of creatinine to 1 7 on presentation on 01/29/2022 concerning for suspected mild SHAWNEE  GFR of 36 on presentation also decreased from baseline GFR in the mid 40s to low 50s  In setting of known hip fracture and anticipate operative intervention with patient being at risk for worsening SHAWNEE, will consult Nephrology for assistance with perioperative management  - Await formal Nephrology evaluation recommendations  Consult is non urgent  I did speak with Nephrology on 01/29/2022 and we will plan to hold the patient's diuretic therapy as he does not appear volume overloaded or in CHF at this time  Monitor volume status closely with daily weights and accurate I/Os  - Avoid nephrotoxic medications and hypotension   - Repeat labs including BMP on 01/30/2022  CAD (coronary artery disease)  Assessment & Plan  - Patient with chronic significant history of CAD  Patient follows with Dr Wesley Miller  - Await [facility] evaluation and recommendations for perioperative risk assessment and assisted with perioperative management   - No evidence of acute cardiac event at this time  - Obtain EKG for perioperative planning   - Continue home medication therapy as appropriate  Diuretic therapy will be held due to mild elevation of creatinine and risk for worsening SHAWNEE  Patient also on Plavix for anti-platelet therapy; this will be held in anticipation of orthopedic operative intervention  Chronic diastolic CHF (congestive heart failure) (HCC)  Assessment & Plan  Wt Readings from Last 3 Encounters:   01/24/22 131 kg (289 lb)   01/06/22 130 kg (286 lb)   01/04/22 133 kg (294 lb)     - Chronic history of diastolic CHF without evidence of acute heart failure or decompensated heart failure at this time  - Await [facility]iology evaluation and recommendations for perioperative risk assessment and assisted with perioperative management    - In setting of mildly elevated creatinine and suspected mild SHAWNEE superimposed on chronic kidney disease stage 3, will hold diuretic therapy at this time  Patient does not appear volume overloaded  - Monitor volume status closely with daily weights and accurate I/Os  - Continue home medication therapy aside from diuretic therapy as appropriate  Chronic a-fib (HCC)  Assessment & Plan  - Chronic history of atrial fibrillation   - Patient normally anticoagulated with Xarelto  Will hold anticoagulant therapy in anticipation of orthopedic operative intervention   - Plan to resume anticoagulation postoperatively  - Monitor for adequate heart rate control   - Continue home medication regimen as appropriate otherwise  - Await Cardiology evaluation and recommendations   - Outpatient follow-up per team     Benign hypertension with chronic kidney disease, stage III [facility])  Assessment & Plan  Lab Results   Component Value Date    EGFR 36 01/29/2022    EGFR 45 11/24/2021    EGFR 44 11/23/2021    CREATININE 1 70 (H) 01/29/2022    CREATININE 1 57 (H) 01/05/2022    CREATININE 1 44 (H) 11/24/2021     - Patient with history of chronic hypertension   - Monitor blood pressures with goal systolic pressure less than 160  Will allow for some permissive hypertension to avoid hypotension in setting of chronic kidney disease and mildly elevated creatinine above baseline  - Await Cardiology and Nephrology evaluation and recommendations   - Outpatient follow-up with PCP per routine  Lumbar radiculopathy  Assessment & Plan  - Patient with chronic lumbar radiculopathy and pain in his legs  Patient reports acutely worsened pain in his calves and feet since his fall   - Neurovascular exam intact on initial evaluation   - Obtain T and L-spine x-rays to evaluate for acute injury, though patient has no midline or paraspinal tenderness at this time  If x-rays negative, no further workup indicated at this time and less change in neurologic exam   - Continue multimodal analgesic regimen including gabapentin    - Outpatient follow-up with PCP and pain provider  Chronic pain syndrome  Assessment & Plan  - Patient with chronic pain syndrome   - Continue multimodal analgesic regimen with additional agents added/adjustments made in setting of acute pain secondary to traumatic injury  - Bowel regimen while on opioid therapy  - APS consult to assist with acute on chronic pain and anticipated postoperative pain  - Outpatient follow-up with PCP and pain provider  Disposition:  Admit to the trauma service as med surg level of care  Consult placed to Orthopedic surgery, Cardiology, and Nephrology for assistance with management  Anticipate operative intervention with Orthopedic surgery in the next 24 hours  H&P Exam - Trauma   Magdiel Palma 80 y o  male MRN: [id number]  Unit/Bed#: [place] [address] Encounter: [id number]    Assessment/Plan   Trauma Alert: Evaluation  Model of Arrival: Ambulance  Trauma Team: Attending Dr Devon Resendez and RAKEL Canchola PA-C  Consultants:  Orthopedic surgery consulted and notified via tiger connect at 12:43 p m  With response by 1:00 p m  Luc Aranda Cardiology consulted for perioperative risk assessment and management and notified in person  Nephrology consulted non urgently in notified via tiger connect at 1:00 p m  With response at 1:00 p m     Trauma Active Problems and Trauma Plan:  See above  Chief Complaint:  My legs hurt      History of Present Illness   HPI:  Magdiel Palma is a 80 y o  male who presents with leg pain, most notably right hip pain  Patient was out plowing snow and got out of his vehicle when he slipped on ice and fell to the ground  He denied head strike or loss of consciousness  He noted right hip pain following his fall  He also notes significant pain in his calves and feet which is chronic, but worse than usual   Mechanism:Fall    Review of Systems   Constitutional: Negative for activity change, appetite change, chills, fatigue, fever and unexpected weight change     HENT: Negative for congestion, facial swelling and sore throat  Eyes: Negative  Negative for photophobia, pain and visual disturbance  Respiratory: Negative  Negative for cough, chest tightness, shortness of breath and wheezing  Cardiovascular: Negative  Negative for chest pain, palpitations and leg swelling  Gastrointestinal: Negative  Negative for abdominal distention, abdominal pain, constipation, diarrhea, nausea and vomiting  Endocrine: Negative  Genitourinary: Negative  Negative for difficulty urinating, dysuria, flank pain, frequency and hematuria  Musculoskeletal: Positive for arthralgias (Right hip pain) and myalgias (Bilateral calf and feet pain, acute on chronic, and worse than usual)  Negative for back pain  Skin: Positive for wound (Wound to right elbow from fall)  Negative for color change, pallor and rash  Allergic/Immunologic: Negative  Neurological: Negative  Negative for dizziness, syncope, weakness, light-headedness, numbness (No numbness in the lower extremities, but patient does admit to chronic decreased sensation in his lower legs and feet ) and headaches  Hematological: Negative  Psychiatric/Behavioral: Negative  Negative for agitation, confusion, self-injury and sleep disturbance  The patient is not nervous/anxious  12-point, complete review of systems was reviewed and negative except as stated above         Historical Information   Efforts to obtain history included the following sources: family member, other medical personnel, obtained from other records    Past Medical History:   Diagnosis Date    Arthritis     Atrial flutter ([facility] )     Chronic kidney disease     stage 3    Coronary artery disease     2 stents    Fluid retention     Gout     Heart failure ([facility] )     pacemaker    Hypertension     Pacemaker     Pulmonary emphysema ([facility] )     Radiculopathy     last assessed 1/28/16     Shortness of breath     exertion    Sleep apnea     c pap     Past Surgical History: Procedure Laterality Date    ANGIOPLASTY      x2 2 stents and then replaced    CARDIAC PACEMAKER PLACEMENT      pacemaker permanent placement dual chamber / last assessed 14 / implantation     CARDIAC SURGERY      pacemaker    CHOLECYSTECTOMY      CORONARY ANGIOPLASTY WITH STENT PLACEMENT      EPIDURAL BLOCK INJECTION N/A 2016    Procedure: BLOCK / INJECTION EPIDURAL STEROID LUMBAR  L4-5;  Surgeon: Dania Chandler MD;  Location[facility] MAIN OR;  Service:     EPIDURAL BLOCK INJECTION N/A 2019    Procedure: L4 L5 Lumbar Epidural Steroid Injection;  Surgeon: Dania Chandler MD;  Location: [facility] MAIN OR;  Service: Pain Management     EYE SURGERY      cataract left    KNEE ARTHROSCOPY W/ MENISCAL REPAIR Left     LUMBAR EPIDURAL INJECTION N/A 3/17/2016    Procedure: BLOCK / INJECTION LUMBAR  L4-5  (C-ARM);   Surgeon: Dania Chandler MD;  Location: [facility] MAIN OR;  Service:      Social History   Social History     Substance and Sexual Activity   Alcohol Use Never     Social History     Substance and Sexual Activity   Drug Use Never     Social History     Tobacco Use   Smoking Status Former Smoker    Packs/day: 1 00    Years: 50 00    Pack years: 50 00    Types: Cigarettes    Quit date: 3/27/2019    Years since quittin 8   Smokeless Tobacco Never Used   Tobacco Comment    quit 2021     E-Cigarette/Vaping    E-Cigarette Use Never User      E-Cigarette/Vaping Substances    Nicotine No     THC No     CBD No     Flavoring No     Other No     Unknown No      Immunization History   Administered Date(s) Administered    COVID-19 MODERNA VACC 0 5 ML IM 2021, 2021    Influenza Split High Dose Preservative Free IM 2013, 10/23/2015    Influenza, high dose seasonal 0 7 mL 10/01/2018, 10/10/2019, 2020, 2021    Influenza, seasonal, injectable 2001, 12/15/2009, 2011, 10/19/2011, 10/01/2012    Pneumococcal Conjugate 13-Valent 2019    Pneumococcal Polysaccharide PPV23 03/26/2013    Tdap 04/01/2016, 04/03/2016    Zoster 10/01/2012    Zoster Vaccine Recombinant 02/03/2020, 07/08/2020     Last Tetanus:  4/2016  Family History: Non-contributory  Unable to obtain/limited by N/A      Meds/Allergies   all current active meds have been reviewed, current meds:   Current Facility-Administered Medications   Medication Dose Route Frequency    acetaminophen (TYLENOL) tablet 975 mg  975 mg Oral Q8H [facility]    albuterol (PROVENTIL HFA,VENTOLIN HFA) inhaler 2 puff  2 puff Inhalation Q6H PRN    allopurinol (ZYLOPRIM) tablet 200 mg  200 mg Oral Daily    ascorbic acid (VITAMIN C) tablet 500 mg  500 mg Oral Daily    calcium carbonate-vitamin D (OSCAL-D) 500 mg-200 units per tablet 2 tablet  2 tablet Oral HS    cholecalciferol (VITAMIN D3) tablet 1,000 Units  1,000 Units Oral Daily    cyanocobalamin (VITAMIN B-12) tablet 100 mcg  100 mcg Oral Daily    DULoxetine (CYMBALTA) delayed release capsule 60 mg  60 mg Oral Daily    felodipine (PLENDIL) 24 hr tablet 5 mg  5 mg Oral Daily    finasteride (PROSCAR) tablet 5 mg  5 mg Oral Daily    fluticasone-vilanterol (BREO ELLIPTA) 200-25 MCG/INH inhaler 1 puff  1 puff Inhalation Daily    gabapentin (NEURONTIN) capsule 300 mg  300 mg Oral Daily    gabapentin (NEURONTIN) capsule 300 mg  300 mg Oral BID    glucosamine sulfate capsule 500 mg  500 mg Oral BID    heparin (porcine) subcutaneous injection 5,000 Units  5,000 Units Subcutaneous Q8H [facility]    HYDROmorphone (DILAUDID) injection 0 2 mg  0 2 mg Intravenous Q4H PRN    lidocaine (LIDODERM) 5 % patch 1 patch  1 patch Topical Daily    naloxone (NARCAN) 0 04 mg/mL syringe 0 04 mg  0 04 mg Intravenous Q1MIN PRN    oxyCODONE (ROXICODONE) IR tablet 2 5 mg  2 5 mg Oral Q4H PRN    oxyCODONE (ROXICODONE) IR tablet 5 mg  5 mg Oral Q4H PRN    polyethylene glycol (MIRALAX) packet 17 g  17 g Oral Daily PRN    pravastatin (PRAVACHOL) tablet 40 mg  40 mg Oral Daily With Dinner    senna-docusate sodium (SENOKOT S) 8 6-50 mg per tablet 1 tablet  1 tablet Oral BID    and PTA meds:   Prior to Admission Medications   Prescriptions Last Dose Informant Patient Reported? Taking? B Complex Vitamins (B COMPLEX 1 PO)  Child Yes Yes   Sig: Take 1 tablet by mouth daily  Biotin (BIOTIN 5000) 5 MG CAPS  Child Yes Yes   Sig: Take 1,000 mcg by mouth daily  Cranberry 1000 MG CAPS  Child Yes Yes   Sig: Take 1 tablet by mouth 2 (two) times a day  DULoxetine (CYMBALTA) 60 mg delayed release capsule  Child No Yes   Sig: Take 1 capsule (60 mg total) by mouth daily   Farxiga 5 MG TABS   Yes No   Flaxseed, Linseed, (EQL FLAX SEED OIL) 1000 MG CAPS  Child Yes Yes   Sig: Take by mouth daily  albuterol (Ventolin HFA) 90 mcg/act inhaler  Child No Yes   Sig: Inhale 2 puffs every 6 (six) hours as needed for wheezing   allopurinol (ZYLOPRIM) 100 mg tablet   No Yes   Sig: Take 2 tablets (200 mg total) by mouth daily   ascorbic acid (VITAMIN C) 500 mg tablet  Child Yes Yes   Sig: Take 500 mg by mouth daily  calcium carbonate-vitamin D (OSCAL-D) 500 mg-200 units per tablet  Child Yes Yes   Sig: Take 2 tablets by mouth daily at bedtime     cholecalciferol (VITAMIN D3) 1,000 units tablet  Child Yes Yes   Sig: Take 1,000 Units by mouth daily   clopidogrel (PLAVIX) 75 mg tablet  Child No Yes   Sig: Take 1 tablet (75 mg total) by mouth daily   colchicine (COLCRYS) 0 6 mg tablet  Child No Yes   Sig: Take 1 tablet (0 6 mg total) by mouth 2 (two) times a day Take twice daily x 7 days as needed for gout   Patient taking differently: Take 0 6 mg by mouth 2 (two) times a day as needed Take twice daily x 7 days as needed for gout    cyanocobalamin 1000 MCG tablet  Child Yes Yes   Sig: Take 100 mcg by mouth daily   dextromethorphan-guaiFENesin (ROBITUSSIN DM)  mg/5 mL syrup  Child No Yes   Sig: Take 10 mL by mouth every 4 (four) hours as needed for cough   docusate sodium (COLACE) 100 mg capsule  Child No Yes Sig: Take 3 capsules (300 mg total) by mouth daily   Patient taking differently: Take 300 mg by mouth as needed     felodipine (PLENDIL) 5 mg 24 hr tablet  Child No Yes   Sig: Take 1 tablet (5 mg total) by mouth daily   finasteride (PROSCAR) 5 mg tablet  Child No Yes   Sig: Take 1 tablet (5 mg total) by mouth daily   fluocinonide (LIDEX) 0 05 % cream  Spouse/Significant Other No Yes   Sig: Apply topically 2 (two) times a day Prn leg rash, short term   Patient taking differently: Apply topically as needed Prn leg rash, short term   fluticasone-umeclidinium-vilanterol (Trelegy Ellipta) 200-62 5-25 MCG/INH AEPB inhaler  Child No No   Sig: Inhale 1 puff daily Rinse mouth after use  furosemide (LASIX) 40 mg tablet  Child No Yes   Sig: Take 1 tablet (40 mg total) by mouth 2 (two) times a day   gabapentin (Neurontin) 300 mg capsule  Child No Yes   Simg in am, 600mg in pm   glucosamine-chondroitin 500-400 MG tablet  Child Yes Yes   Sig: Take 1 tablet by mouth 2 (two) times a day     rivaroxaban (XARELTO) 15 mg tablet  Child No Yes   Sig: Take 1 tablet (15 mg total) by mouth daily with breakfast   Patient taking differently: Take 20 mg by mouth daily at bedtime    simvastatin (ZOCOR) 20 mg tablet  Child No Yes   Sig: Take 1 tablet (20 mg total) by mouth daily at bedtime   spironolactone (ALDACTONE) 25 mg tablet  Child Yes Yes   Si mg daily        Facility-Administered Medications: None       No Known Allergies      PHYSICAL EXAM    PE limited by: N/A    Objective   Vitals:   First set: Temperature: 98 4 °F (36 9 °C) (22)  Pulse: 70 (22)  Respirations: 18 (22)  Blood Pressure: 136/72 (22)    Primary Survey:   (A) Airway:  Patent and intact  (B) Breathing:  Clear and equal bilaterally  (C) Circulation: Pulses:   normal, carotid  2/4, pedal  2/4, radial  2/4 and femoral  2/4  (D) Disablity:  GCS Total:  15, Eye Opening:   Spontaneous = 4, Motor Response: Obeys commands = 6 and Verbal Response:  Oriented = 5  (E) Expose:  Completed    Secondary Survey: (Click on Physical Exam tab above)  Physical Exam  Vitals and nursing note reviewed  Exam conducted with a chaperone present  Constitutional:       General: He is awake  He is not in acute distress  Appearance: Normal appearance  He is not ill-appearing, toxic-appearing or diaphoretic  Interventions: He is not intubated  HENT:      Head: Normocephalic and atraumatic  No abrasion, contusion or laceration  Jaw: There is normal jaw occlusion  Right Ear: Hearing and external ear normal  No swelling or tenderness  Left Ear: Hearing and external ear normal  No swelling or tenderness  Nose: Nose normal  No nasal deformity, septal deviation, signs of injury, laceration or nasal tenderness  Mouth/Throat:      Mouth: Mucous membranes are moist       Pharynx: Oropharynx is clear  Eyes:      General: Lids are normal  Vision grossly intact  Extraocular Movements: Extraocular movements intact  Conjunctiva/sclera: Conjunctivae normal       Pupils: Pupils are equal, round, and reactive to light  Comments: Pupils were 3 mm equal, round and reactive bilaterally   Neck:      Comments: Cervical spine cleared by ED staff prior to trauma evaluation  Cardiovascular:      Rate and Rhythm: Normal rate  Rhythm irregularly irregular  Pulses: Normal pulses  Carotid pulses are 2+ on the right side and 2+ on the left side  Radial pulses are 2+ on the right side and 2+ on the left side  Femoral pulses are 2+ on the right side and 2+ on the left side  Dorsalis pedis pulses are 2+ on the right side and 2+ on the left side  Heart sounds: Normal heart sounds  No murmur heard  No friction rub  No gallop  Pulmonary:      Effort: Pulmonary effort is normal  No tachypnea, bradypnea, accessory muscle usage, prolonged expiration, respiratory distress or retractions   He is not intubated  Breath sounds: Normal breath sounds and air entry  No stridor or decreased air movement  No decreased breath sounds, wheezing, rhonchi or rales  Chest:      Chest wall: No lacerations, deformity, swelling, tenderness or crepitus  Abdominal:      General: Abdomen is flat  Bowel sounds are normal  There is no distension  Palpations: Abdomen is soft  Tenderness: There is no abdominal tenderness  There is no guarding or rebound  Musculoskeletal:         General: No swelling, deformity or signs of injury  Cervical back: Full passive range of motion without pain, normal range of motion and neck supple  No swelling, deformity, signs of trauma, lacerations or tenderness  No pain with movement, spinous process tenderness or muscular tenderness  Normal range of motion  Thoracic back: No swelling, deformity, signs of trauma, lacerations or tenderness  Normal range of motion  Lumbar back: No swelling, deformity, signs of trauma, lacerations or tenderness  Normal range of motion  Left hip: Tenderness present  No deformity (No significant deformity at the right hip, but right lower extremity did appear mildly shorter than the left and was externally rotated  )  Decreased range of motion  Left upper leg: Swelling and tenderness present  No deformity or lacerations  Right lower leg: No edema  Left lower leg: No edema  Comments: No midline cervical, thoracic or lumbar spine tenderness, step-offs or deformities  No paraspinal muscular tenderness in the neck or back  Normal range of motion in the bilateral upper and left lower extremities without pain, tenderness or deformity  Patient had limited range of motion in the right hip secondary to pain and known right hip fracture with tenderness over the right lateral hip and thigh and associated swelling  The right lower extremity was also shorter than the left and externally rotated     Skin:     General: Skin is warm and dry  Capillary Refill: Capillary refill takes less than 2 seconds  Coloration: Skin is not jaundiced or pale  Findings: Wound (Superficial right elbow skin tear) present  No bruising, ecchymosis, erythema, lesion or rash  Neurological:      General: No focal deficit present  Mental Status: He is alert and oriented to person, place, and time  Mental status is at baseline  GCS: GCS eye subscore is 4  GCS verbal subscore is 5  GCS motor subscore is 6  Sensory: Sensation is intact  No sensory deficit  Motor: Motor function is intact  No weakness  Psychiatric:         Mood and Affect: Mood normal          Behavior: Behavior is cooperative  Invasive Devices  Report    Peripheral Intravenous Line            Peripheral IV 01/29/22 Right Antecubital <1 day                Lab Results:   Results: I have personally reviewed pertinent reports   , BMP/CMP:   Lab Results   Component Value Date    SODIUM 135 (L) 01/29/2022    K 3 8 01/29/2022     01/29/2022    CO2 28 01/29/2022    BUN 38 (H) 01/29/2022    CREATININE 1 70 (H) 01/29/2022    CALCIUM 9 3 01/29/2022    EGFR 36 01/29/2022   , CBC:   Lab Results   Component Value Date    WBC 9 20 01/29/2022    HGB 9 9 (L) 01/29/2022    HCT 33 7 (L) 01/29/2022    MCV 84 01/29/2022     01/29/2022    MCH 24 8 (L) 01/29/2022    MCHC 29 4 (L) 01/29/2022    RDW 16 7 (H) 01/29/2022    MPV 8 8 (L) 01/29/2022    NRBC 0 01/29/2022   , Coagulation: No results found for: PT, INR, APTT and Troponin: No results found for: TROPONINI  Imaging/EKG Studies: Results: I have personally reviewed pertinent reports     and I have personally reviewed pertinent films in PACS  Other Studies: N/A    Code Status: Level 1 - Full Code  Advance Directive and Living Will: Yes    Power of :    POLST:

## 2022-01-29 NOTE — ASSESSMENT & PLAN NOTE
Lab Results   Component Value Date    EGFR 36 01/29/2022    EGFR 45 11/24/2021    EGFR 44 11/23/2021    CREATININE 1 70 (H) 01/29/2022    CREATININE 1 57 (H) 01/05/2022    CREATININE 1 44 (H) 11/24/2021     - Patient with history of chronic hypertension   - Monitor blood pressures with goal systolic pressure less than 160  Will allow for some permissive hypertension to avoid hypotension in setting of chronic kidney disease and mildly elevated creatinine above baseline  - Await Cardiology and Nephrology evaluation and recommendations   - Outpatient follow-up with PCP per routine

## 2022-01-29 NOTE — ASSESSMENT & PLAN NOTE
- Patient with chronic lumbar radiculopathy and pain in his legs  Patient reports acutely worsened pain in his calves and feet since his fall   - Neurovascular exam intact on initial evaluation   - Obtain T and L-spine x-rays to evaluate for acute injury, though patient has no midline or paraspinal tenderness at this time  If x-rays negative, no further workup indicated at this time and less change in neurologic exam   - Continue multimodal analgesic regimen including gabapentin  - Outpatient follow-up with PCP and pain provider

## 2022-01-29 NOTE — ED PROVIDER NOTES
History  Chief Complaint   Patient presents with    Fall     slipped and fell on ice - right hip pain; denies hitting head     80 YR MALE -- THIS AM PLOWING PARKING LOT WITH FRONT END -- GOT OUT OF - SLIPPED AND LANDED ON R HIP---   ALSO DID HIT POSTERIRO R ELBOW- BUT  DENIES ANY HEAD/NECK/BACK/CHEST/ABD INJURY  - NO LOC-- PT REMEMBERS ENTIRE EVENT - WAS NOT ON GROUND FOR LONG--  NO MEDICAL SYMPTOMS CAUSED FALL       History provided by:  Patient   used: No    Fall  Mechanism of injury: fall    Injury location:  Leg  Leg injury location:  R hip      Prior to Admission Medications   Prescriptions Last Dose Informant Patient Reported? Taking? B Complex Vitamins (B COMPLEX 1 PO)  Child Yes Yes   Sig: Take 1 tablet by mouth daily  Biotin (BIOTIN 5000) 5 MG CAPS  Child Yes Yes   Sig: Take 1,000 mcg by mouth daily  Cranberry 1000 MG CAPS  Child Yes Yes   Sig: Take 1 tablet by mouth 2 (two) times a day  DULoxetine (CYMBALTA) 60 mg delayed release capsule  Child No Yes   Sig: Take 1 capsule (60 mg total) by mouth daily   Farxiga 5 MG TABS   Yes No   Flaxseed, Linseed, (EQL FLAX SEED OIL) 1000 MG CAPS  Child Yes Yes   Sig: Take by mouth daily  albuterol (Ventolin HFA) 90 mcg/act inhaler  Child No Yes   Sig: Inhale 2 puffs every 6 (six) hours as needed for wheezing   allopurinol (ZYLOPRIM) 100 mg tablet   No Yes   Sig: Take 2 tablets (200 mg total) by mouth daily   ascorbic acid (VITAMIN C) 500 mg tablet  Child Yes Yes   Sig: Take 500 mg by mouth daily  calcium carbonate-vitamin D (OSCAL-D) 500 mg-200 units per tablet  Child Yes Yes   Sig: Take 2 tablets by mouth daily at bedtime     cholecalciferol (VITAMIN D3) 1,000 units tablet  Child Yes Yes   Sig: Take 1,000 Units by mouth daily   clopidogrel (PLAVIX) 75 mg tablet  Child No Yes   Sig: Take 1 tablet (75 mg total) by mouth daily   colchicine (COLCRYS) 0 6 mg tablet  Child No Yes   Sig: Take 1 tablet (0 6 mg total) by mouth 2 (two) times a day Take twice daily x 7 days as needed for gout   Patient taking differently: Take 0 6 mg by mouth 2 (two) times a day as needed Take twice daily x 7 days as needed for gout    cyanocobalamin 1000 MCG tablet  Child Yes Yes   Sig: Take 100 mcg by mouth daily   dextromethorphan-guaiFENesin (ROBITUSSIN DM)  mg/5 mL syrup  Child No Yes   Sig: Take 10 mL by mouth every 4 (four) hours as needed for cough   docusate sodium (COLACE) 100 mg capsule  Child No Yes   Sig: Take 3 capsules (300 mg total) by mouth daily   Patient taking differently: Take 300 mg by mouth as needed     felodipine (PLENDIL) 5 mg 24 hr tablet  Child No Yes   Sig: Take 1 tablet (5 mg total) by mouth daily   finasteride (PROSCAR) 5 mg tablet  Child No Yes   Sig: Take 1 tablet (5 mg total) by mouth daily   fluocinonide (LIDEX) 0 05 % cream  Spouse/Significant Other No Yes   Sig: Apply topically 2 (two) times a day Prn leg rash, short term   Patient taking differently: Apply topically as needed Prn leg rash, short term   fluticasone-umeclidinium-vilanterol (Trelegy Ellipta) 200-62 5-25 MCG/INH AEPB inhaler  Child No No   Sig: Inhale 1 puff daily Rinse mouth after use  furosemide (LASIX) 40 mg tablet  Child No Yes   Sig: Take 1 tablet (40 mg total) by mouth 2 (two) times a day   gabapentin (Neurontin) 300 mg capsule  Child No Yes   Simg in am, 600mg in pm   glucosamine-chondroitin 500-400 MG tablet  Child Yes Yes   Sig: Take 1 tablet by mouth 2 (two) times a day     rivaroxaban (XARELTO) 15 mg tablet  Child No Yes   Sig: Take 1 tablet (15 mg total) by mouth daily with breakfast   Patient taking differently: Take 20 mg by mouth daily at bedtime    simvastatin (ZOCOR) 20 mg tablet  Child No Yes   Sig: Take 1 tablet (20 mg total) by mouth daily at bedtime   spironolactone (ALDACTONE) 25 mg tablet  Child Yes Yes   Si mg daily        Facility-Administered Medications: None       Past Medical History:   Diagnosis Date    Arthritis     Atrial flutter (HCC)     Chronic kidney disease     stage 3    Coronary artery disease     2 stents    Fluid retention     Gout     Heart failure (HCC)     pacemaker    Hypertension     Pacemaker     Pulmonary emphysema ([address] )     Radiculopathy     last assessed 16     Shortness of breath     exertion    Sleep apnea     c pap       Past Surgical History:   Procedure Laterality Date    ANGIOPLASTY      x2 2 stents and then replaced    CARDIAC PACEMAKER PLACEMENT      pacemaker permanent placement dual chamber / last assessed 14 / implantation     CARDIAC SURGERY      pacemaker    CHOLECYSTECTOMY      CORONARY ANGIOPLASTY WITH STENT PLACEMENT      EPIDURAL BLOCK INJECTION N/A 2016    Procedure: BLOCK / INJECTION EPIDURAL STEROID LUMBAR  L4-5;  Surgeon: Lito Thomas MD;  Location[facility] MA[facility] OR;  Service:     EPIDURAL BLOCK INJECTION N/A 2019    Procedure: L4 L5 Lumbar Epidural Steroid Injection;  Surgeon: Lito Thomas MD;  Location: [facility] MAIN OR;  Service: Pain Management     EYE SURGERY      cataract left    KNEE ARTHROSCOPY W/ MENISCAL REPAIR Left     LUMBAR EPIDURAL INJECTION N/A 3/17/2016    Procedure: BLOCK / INJECTION LUMBAR  L4-5  (C-ARM); Surgeon: Lito Thomas MD;  Location: [facility] MAIN OR;  Service:        Family History   Problem Relation Age of Onset    Cancer Mother 80    Heart disease Mother     Hypertension Mother     Heart disease Father     Diabetes Neg Hx     Stroke Neg Hx      I have reviewed and agree with the history as documented      E-Cigarette/Vaping    E-Cigarette Use Never User      E-Cigarette/Vaping Substances    Nicotine No     THC No     CBD No     Flavoring No     Other No     Unknown No      Social History     Tobacco Use    Smoking status: Former Smoker     Packs/day: 1 00     Years: 50 00     Pack years: 50 00     Types: Cigarettes     Quit date: 3/27/2019     Years since quittin 8    Smokeless tobacco: Never Used    Tobacco comment: quit 02/14/2021   Vaping Use    Vaping Use: Never used   Substance Use Topics    Alcohol use: Never    Drug use: Never       Review of Systems   Constitutional: Negative  HENT: Negative  Eyes: Negative  Respiratory: Negative  Cardiovascular: Negative  Gastrointestinal: Negative  Endocrine: Negative  Genitourinary: Negative  Musculoskeletal: Negative  R HIP INJURY - R POSTERIOR ELBOW ABRASION    Skin: Negative  R POSTERIOR  ELBOW ABRASION    Allergic/Immunologic: Negative  Neurological: Negative  Hematological: Negative  Psychiatric/Behavioral: Negative  Physical Exam  Physical Exam  Vitals and nursing note reviewed  Constitutional:       General: He is not in acute distress  Appearance: Normal appearance  He is not ill-appearing, toxic-appearing or diaphoretic  Comments: AVSS--  PULSE OX 94 % ON RA- INTERPRETATION - IS LOW- NORMAL-    HENT:      Head: Normocephalic and atraumatic  Comments: NO SCALP( TENDERNESS/CONTUSION/ HEMATOMA     Right Ear: Tympanic membrane, ear canal and external ear normal  There is no impacted cerumen  Left Ear: Tympanic membrane, ear canal and external ear normal  There is no impacted cerumen  Nose: Nose normal  No congestion  Mouth/Throat:      Mouth: Mucous membranes are moist       Pharynx: Oropharynx is clear  No oropharyngeal exudate or posterior oropharyngeal erythema  Eyes:      General: No scleral icterus  Right eye: No discharge  Left eye: No discharge  Extraocular Movements: Extraocular movements intact  Conjunctiva/sclera: Conjunctivae normal       Pupils: Pupils are equal, round, and reactive to light  Comments: MM PINK   Neck:      Vascular: No carotid bruit  Comments: NO PMT C/T/L/S SPINE   Cardiovascular:      Rate and Rhythm: Normal rate and regular rhythm  Pulses: Normal pulses        Heart sounds: Normal heart sounds  No murmur heard  No friction rub  No gallop  Pulmonary:      Effort: Pulmonary effort is normal  No respiratory distress  Breath sounds: No stridor  No wheezing, rhonchi or rales  Comments: MILD DECREASED BS AT BASES BILATERALLY   Chest:      Chest wall: No tenderness  Abdominal:      General: Bowel sounds are normal  There is no distension  Palpations: Abdomen is soft  There is no mass  Tenderness: There is no abdominal tenderness  There is no right CVA tenderness, left CVA tenderness, guarding or rebound  Hernia: No hernia is present  Comments: SOFT NT/ND- NO HSM- NO CVA TENDERNESS- NO PERITONEAL SIGNS- NO ASCITES    Musculoskeletal:         General: Swelling, tenderness, deformity and signs of injury present  Cervical back: Normal range of motion and neck supple  No rigidity or tenderness  Right lower leg: Edema present  Left lower leg: Edema present  Comments: R POSTERIOR ELBOW SUPERFICIAL ABRASION - NT AT ELBOW- NORMAL ROM/STRENGTH/NO RADIAL HEAD TENDERNESS    - RLE- SHORTENED AND EXTERNAL ROTATED- UNABLE TO RAISE RLE AT HIP AGAINT GRAVITY -- 1 PLUS BLE PRETIBIAL/PEDAL EDEMA- NT- NO ASYM- CHRONIC - EQUAL BILATERAL RADIAL/DP PULSES   Lymphadenopathy:      Cervical: No cervical adenopathy  Skin:     General: Skin is warm  Capillary Refill: Capillary refill takes less than 2 seconds  Coloration: Skin is not jaundiced or pale  Findings: No bruising, erythema, lesion or rash  Neurological:      General: No focal deficit present  Mental Status: He is alert and oriented to person, place, and time  Mental status is at baseline  Cranial Nerves: No cranial nerve deficit  Sensory: No sensory deficit  Motor: No weakness     Psychiatric:         Mood and Affect: Mood normal          Behavior: Behavior normal          Vital Signs  ED Triage Vitals [01/29/22 0943]   Temperature Pulse Respirations Blood Pressure SpO2   98 4 °F (36 9 °C) 70 18 136/72 94 %      Temp Source Heart Rate Source Patient Position - Orthostatic VS BP Location FiO2 (%)   Oral Monitor -- -- --      Pain Score       5           Vitals:    01/29/22 0943   BP: 136/72   Pulse: 70         Visual Acuity      ED Medications  Medications   morphine (PF) 10 mg/mL injection 8 mg (has no administration in time range)   morphine (PF) 4 mg/mL injection 4 mg (4 mg Intravenous Given 1/29/22 1016)   morphine (PF) 4 mg/mL injection 4 mg (4 mg Intravenous Given 1/29/22 1106)       Diagnostic Studies  Results Reviewed     Procedure Component Value Units Date/Time    Basic metabolic panel [[id number]]  (Abnormal) Collected: 01/29/22 1016    Lab Status: Final result Specimen: Blood from Arm, Right Updated: 01/29/22 1049     Sodium 135 mmol/L      Potassium 3 8 mmol/L      Chloride 100 mmol/L      CO2 28 mmol/L      ANION GAP 7 mmol/L      BUN 38 mg/dL      Creatinine 1 70 mg/dL      Glucose 163 mg/dL      Calcium 9 3 mg/dL      eGFR 36 ml/min/1 73sq m     Narrative:      [facility] guidelines for Chronic Kidney Disease (CKD):     Stage 1 with normal or high GFR (GFR > 90 mL/min/1 73 square meters)    Stage 2 Mild CKD (GFR = 60-89 mL/min/1 73 square meters)    Stage 3A Moderate CKD (GFR = 45-59 mL/min/1 73 square meters)    Stage 3B Moderate CKD (GFR = 30-44 mL/min/1 73 square meters)    Stage 4 Severe CKD (GFR = 15-29 mL/min/1 73 square meters)    Stage 5 End Stage CKD (GFR <15 mL/min/1 73 square meters)  Note: GFR calculation is accurate only with a steady state creatinine    CBC and differential [[id number]]  (Abnormal) Collected: 01/29/22 1016    Lab Status: Final result Specimen: Blood from Arm, Right Updated: 01/29/22 1032     WBC 9 20 Thousand/uL      RBC 4 00 Million/uL      Hemoglobin 9 9 g/dL      Hematocrit 33 7 %      MCV 84 fL      MCH 24 8 pg      MCHC 29 4 g/dL      RDW 16 7 %      MPV 8 8 fL      Platelets 694 Thousands/uL      nRBC 0 /100 WBCs Neutrophils Relative 79 %      Immat GRANS % 1 %      Lymphocytes Relative 11 %      Monocytes Relative 6 %      Eosinophils Relative 2 %      Basophils Relative 1 %      Neutrophils Absolute 7 29 Thousands/µL      Immature Grans Absolute 0 10 Thousand/uL      Lymphocytes Absolute 1 02 Thousands/µL      Monocytes Absolute 0 52 Thousand/µL      Eosinophils Absolute 0 21 Thousand/µL      Basophils Absolute 0 06 Thousands/µL                  XR chest 1 view portable   Final Result by Maximino Mckeon MD (01/29 1224)      No acute cardiopulmonary disease  Workstation performed: [id number]         XR pelvis ap only 1 or 2 vw   Final Result by Maximino Mckeon MD (01/29 1227)      Acute intertrochanteric fracture of the right proximal femur  No associated hip dislocation  Workstation performed: [id number]         XR femur 2 vw right   Final Result by Maximino Mckeon MD (01/29 1230)      Acute proximal femoral intertrochanteric fracture  Severe tricompartment osteoarthritis of the right knee  Workstation performed: [id number]         XR spine thoracic 3 vw    (Results Pending)   XR spine lumbar 2 or 3 views injury    (Results Pending)              Procedures  Procedures         ED Course  ED Course as of 01/29/22 1245   Sat Jan 29, 2022   1058 Cxr portable- cardiomegaly- dual lead pacemaker--    1059 Pelvis/ r hip xray - pos right intertrochanteric fx    1059 - er md note-  trauma attendrfing contacted- aware of  admit for r hip fx   5 Er md note- pt and wife made aware of r hip fx- and need for admit and surg- last  time ate was last night                                SBIRT 22yo+      Most Recent Value   SBIRT (25 yo +)    In order to provide better care to our patients, we are screening all of our patients for alcohol and drug use  Would it be okay to ask you these screening questions? Yes Filed at: 01/29/2022 0962   Initial Alcohol Screen: US AUDIT-C     1   How often do you have a drink containing alcohol? 0 Filed at: 01/29/2022 0947   2  How many drinks containing alcohol do you have on a typical day you are drinking? 0 Filed at: 01/29/2022 0947   3a  Male UNDER 65: How often do you have five or more drinks on one occasion? 0 Filed at: 01/29/2022 0947   3b  FEMALE Any Age, or MALE 65+: How often do you have 4 or more drinks on one occassion? 0 Filed at: 01/29/2022 0947   Audit-C Score 0 Filed at: 01/29/2022 3260   LILIANA: How many times in the past year have you    Used an illegal drug or used a prescription medication for non-medical reasons? Never Filed at: 01/29/2022 5433                    MDM    Disposition  Final diagnoses:   Fall, initial encounter   Contusion of right elbow, initial encounter   Abrasion of right elbow, initial encounter   Intertrochanteric fracture ([facility] )     Time reflects when diagnosis was documented in both MDM as applicable and the Disposition within this note     Time User Action Codes Description Comment    1/29/2022 12:30 PM Karely Back Add [S72 91XA] Closed fracture of right femur, unspecified fracture morphology, initial encounter ([facility] )     1/29/2022 12:30 PM Karely Back Add [I25 10] Coronary artery arteriosclerosis     1/29/2022 12:31 PM Alvarado, Laxmi Ripple Add [I25 10] Coronary artery disease involving native coronary artery of native heart without angina pectoris     1/29/2022 12:31 PM Karely Back Remove [I25 10] Coronary artery arteriosclerosis     1/29/2022 12:31 PM Alvarado, Laxmi Ripple Add [R06 00] Dyspnea on exertion     1/29/2022 12:31 PM Alvarado, Laxmi Ripple Add [I50 33] Acute on chronic diastolic heart failure ([facility] )     1/29/2022 12:40 PM Catrachita Prather  NNVZ] Fall, initial encounter     1/29/2022 12:41 PM Abelardo Sicard D Add [S50 01XA] Contusion of right elbow, initial encounter     1/29/2022 12:41 PM Catrachita Uriarte Add [S50 311A] Abrasion of right elbow, initial encounter     1/29/2022 12:41 PM Catrachita Uriarte Add [C94 103Q] Intertrochanteric fracture [facility])       ED Disposition     ED Disposition Condition Date/Time Comment    Admit Stable Sat Jan 29, 2022 1240 Case was discussed with GENIE MONCADA and the patient's admission status was agreed to be Admission Status: inpatient status to the service of Dr Tony Tristan     Follow-up Information    None         Patient's Medications   Discharge Prescriptions    No medications on file       No discharge procedures on file      PDMP Review     None          ED Provider  Electronically Signed by           Zeb Meza MD  01/29/22 7251

## 2022-01-29 NOTE — ASSESSMENT & PLAN NOTE
- Patient with chronic significant history of CAD  Patient follows with Dr Sarai Carrasco  - Await [facility] Cardiology evaluation and recommendations for perioperative risk assessment and assisted with perioperative management   - No evidence of acute cardiac event at this time  - Obtain EKG for perioperative planning   - Continue home medication therapy as appropriate  Diuretic therapy will be held due to mild elevation of creatinine and risk for worsening SHAWNEE  Patient also on Plavix for anti-platelet therapy; this will be held in anticipation of orthopedic operative intervention

## 2022-01-29 NOTE — ASSESSMENT & PLAN NOTE
- Acute right intertrochanteric femur fracture, present on admission   - Await Orthopedic surgery evaluation and recommendations  Anticipate likely operative intervention  In anticipation of operative intervention, will consult Cardiology for perioperative risk assessment and assistance with management in setting of significant baseline heart disease; additionally, will consult Nephrology due to known CKD 3 with mild SHAWNEE, present on presentation, to assist with perioperative renal management  Also, plan to hold anti-platelet and anticoagulant therapy; patient notes he has not taken Xarelto since yesterday  - Maintain non-weightbearing status on the right lower extremity   - Monitor right lower extremity neurovascular exam   - Continue multimodal analgesic regimen  - Initiate chemical DVT prophylaxis  - PT and OT evaluation and treatment as indicated

## 2022-01-29 NOTE — ASSESSMENT & PLAN NOTE
- Patient with chronic pain syndrome   - Continue multimodal analgesic regimen with additional agents added/adjustments made in setting of acute pain secondary to traumatic injury  - Bowel regimen while on opioid therapy  - APS consult to assist with acute on chronic pain and anticipated postoperative pain  - Outpatient follow-up with PCP and pain provider

## 2022-01-29 NOTE — ASSESSMENT & PLAN NOTE
Wt Readings from Last 3 Encounters:   01/24/22 131 kg (289 lb)   01/06/22 130 kg (286 lb)   01/04/22 133 kg (294 lb)     - Chronic history of diastolic CHF without evidence of acute heart failure or decompensated heart failure at this time  - Await [facility] Cardiology evaluation and recommendations for perioperative risk assessment and assisted with perioperative management  - In setting of mildly elevated creatinine and suspected mild SHAWNEE superimposed on chronic kidney disease stage 3, will hold diuretic therapy at this time  Patient does not appear volume overloaded  - Monitor volume status closely with daily weights and accurate I/Os  - Continue home medication therapy aside from diuretic therapy as appropriate

## 2022-01-29 NOTE — ASSESSMENT & PLAN NOTE
Lab Results   Component Value Date    EGFR 36 01/29/2022    EGFR 45 11/24/2021    EGFR 44 11/23/2021    CREATININE 1 70 (H) 01/29/2022    CREATININE 1 57 (H) 01/05/2022    CREATININE 1 44 (H) 11/24/2021     - Patient with chronic history of CKD stage 3 with baseline creatinine appearing to be between 1 4 and 1 5   - Mild elevation of creatinine to 1 7 on presentation on 01/29/2022 concerning for suspected mild SHAWNEE  GFR of 36 on presentation also decreased from baseline GFR in the mid 40s to low 50s  In setting of known hip fracture and anticipate operative intervention with patient being at risk for worsening SHAWNEE, will consult Nephrology for assistance with perioperative management  - Await formal Nephrology evaluation recommendations  Consult is non urgent  I did speak with Nephrology on 01/29/2022 and we will plan to hold the patient's diuretic therapy as he does not appear volume overloaded or in CHF at this time  Monitor volume status closely with daily weights and accurate I/Os  - Avoid nephrotoxic medications and hypotension   - Repeat labs including BMP on 01/30/2022

## 2022-01-30 ENCOUNTER — ANESTHESIA EVENT (INPATIENT)
Dept: PERIOP | Facility: HOSPITAL | Age: 82
DRG: 480 | End: 2022-01-30
Payer: MEDICARE

## 2022-01-30 ENCOUNTER — APPOINTMENT (INPATIENT)
Dept: RADIOLOGY | Facility: HOSPITAL | Age: 82
DRG: 480 | End: 2022-01-30
Payer: MEDICARE

## 2022-01-30 LAB
ANION GAP SERPL CALCULATED.3IONS-SCNC: 10 MMOL/L (ref 4–13)
BACTERIA UR QL AUTO: ABNORMAL /HPF
BILIRUB UR QL STRIP: NEGATIVE
BUN SERPL-MCNC: 41 MG/DL (ref 5–25)
CALCIUM SERPL-MCNC: 9.1 MG/DL (ref 8.3–10.1)
CHLORIDE SERPL-SCNC: 101 MMOL/L (ref 100–108)
CLARITY UR: CLEAR
CO2 SERPL-SCNC: 26 MMOL/L (ref 21–32)
COLOR UR: YELLOW
CREAT SERPL-MCNC: 1.89 MG/DL (ref 0.6–1.3)
ERYTHROCYTE [DISTWIDTH] IN BLOOD BY AUTOMATED COUNT: 16.7 % (ref 11.6–15.1)
GFR SERPL CREATININE-BSD FRML MDRD: 32 ML/MIN/1.73SQ M
GLUCOSE SERPL-MCNC: 166 MG/DL (ref 65–140)
GLUCOSE UR STRIP-MCNC: ABNORMAL MG/DL
HCT VFR BLD AUTO: 30.6 % (ref 36.5–49.3)
HGB BLD-MCNC: 8.9 G/DL (ref 12–17)
HGB UR QL STRIP.AUTO: ABNORMAL
HYALINE CASTS #/AREA URNS LPF: ABNORMAL /LPF
KETONES UR STRIP-MCNC: NEGATIVE MG/DL
LEUKOCYTE ESTERASE UR QL STRIP: NEGATIVE
MCH RBC QN AUTO: 24.5 PG (ref 26.8–34.3)
MCHC RBC AUTO-ENTMCNC: 29.1 G/DL (ref 31.4–37.4)
MCV RBC AUTO: 84 FL (ref 82–98)
NITRITE UR QL STRIP: NEGATIVE
NON-SQ EPI CELLS URNS QL MICRO: ABNORMAL /HPF
PH UR STRIP.AUTO: 6.5 [PH]
PLATELET # BLD AUTO: 237 THOUSANDS/UL (ref 149–390)
PMV BLD AUTO: 9.2 FL (ref 8.9–12.7)
POTASSIUM SERPL-SCNC: 4.2 MMOL/L (ref 3.5–5.3)
PROT UR STRIP-MCNC: NEGATIVE MG/DL
RBC # BLD AUTO: 3.63 MILLION/UL (ref 3.88–5.62)
RBC #/AREA URNS AUTO: ABNORMAL /HPF
SODIUM SERPL-SCNC: 137 MMOL/L (ref 136–145)
SP GR UR STRIP.AUTO: 1.01 (ref 1–1.03)
UROBILINOGEN UR QL STRIP.AUTO: 0.2 E.U./DL
WBC # BLD AUTO: 13.15 THOUSAND/UL (ref 4.31–10.16)
WBC #/AREA URNS AUTO: ABNORMAL /HPF

## 2022-01-30 PROCEDURE — 99223 1ST HOSP IP/OBS HIGH 75: CPT | Performed by: ORTHOPAEDIC SURGERY

## 2022-01-30 PROCEDURE — 99232 SBSQ HOSP IP/OBS MODERATE 35: CPT | Performed by: STUDENT IN AN ORGANIZED HEALTH CARE EDUCATION/TRAINING PROGRAM

## 2022-01-30 PROCEDURE — 71045 X-RAY EXAM CHEST 1 VIEW: CPT

## 2022-01-30 PROCEDURE — 94660 CPAP INITIATION&MGMT: CPT

## 2022-01-30 PROCEDURE — 94760 N-INVAS EAR/PLS OXIMETRY 1: CPT

## 2022-01-30 PROCEDURE — 81001 URINALYSIS AUTO W/SCOPE: CPT | Performed by: INTERNAL MEDICINE

## 2022-01-30 PROCEDURE — 99222 1ST HOSP IP/OBS MODERATE 55: CPT | Performed by: INTERNAL MEDICINE

## 2022-01-30 PROCEDURE — 36415 COLL VENOUS BLD VENIPUNCTURE: CPT | Performed by: PHYSICIAN ASSISTANT

## 2022-01-30 PROCEDURE — 85027 COMPLETE CBC AUTOMATED: CPT | Performed by: PHYSICIAN ASSISTANT

## 2022-01-30 PROCEDURE — 80048 BASIC METABOLIC PNL TOTAL CA: CPT | Performed by: PHYSICIAN ASSISTANT

## 2022-01-30 PROCEDURE — 99223 1ST HOSP IP/OBS HIGH 75: CPT | Performed by: INTERNAL MEDICINE

## 2022-01-30 RX ORDER — LEVALBUTEROL 1.25 MG/.5ML
1.25 SOLUTION, CONCENTRATE RESPIRATORY (INHALATION)
Status: DISCONTINUED | OUTPATIENT
Start: 2022-01-31 | End: 2022-02-07 | Stop reason: HOSPADM

## 2022-01-30 RX ORDER — FELODIPINE 2.5 MG/1
5 TABLET, EXTENDED RELEASE ORAL DAILY
Status: DISCONTINUED | OUTPATIENT
Start: 2022-01-30 | End: 2022-02-04

## 2022-01-30 RX ORDER — FUROSEMIDE 10 MG/ML
40 INJECTION INTRAMUSCULAR; INTRAVENOUS ONCE
Status: COMPLETED | OUTPATIENT
Start: 2022-01-30 | End: 2022-01-30

## 2022-01-30 RX ORDER — LEVALBUTEROL INHALATION SOLUTION 0.63 MG/3ML
0.63 SOLUTION RESPIRATORY (INHALATION) EVERY 8 HOURS PRN
Status: DISCONTINUED | OUTPATIENT
Start: 2022-01-30 | End: 2022-02-07 | Stop reason: HOSPADM

## 2022-01-30 RX ORDER — SODIUM CHLORIDE FOR INHALATION 0.9 %
3 VIAL, NEBULIZER (ML) INHALATION
Status: DISCONTINUED | OUTPATIENT
Start: 2022-01-31 | End: 2022-02-07 | Stop reason: HOSPADM

## 2022-01-30 RX ADMIN — OXYCODONE HYDROCHLORIDE AND ACETAMINOPHEN 500 MG: 500 TABLET ORAL at 11:35

## 2022-01-30 RX ADMIN — Medication 500 MG: at 18:38

## 2022-01-30 RX ADMIN — FLUTICASONE FUROATE AND VILANTEROL TRIFENATATE 1 PUFF: 200; 25 POWDER RESPIRATORY (INHALATION) at 11:40

## 2022-01-30 RX ADMIN — Medication 1000 UNITS: at 11:36

## 2022-01-30 RX ADMIN — ALLOPURINOL 200 MG: 100 TABLET ORAL at 11:34

## 2022-01-30 RX ADMIN — OXYCODONE HYDROCHLORIDE 5 MG: 5 TABLET ORAL at 05:41

## 2022-01-30 RX ADMIN — HEPARIN SODIUM 5000 UNITS: 5000 INJECTION INTRAVENOUS; SUBCUTANEOUS at 16:43

## 2022-01-30 RX ADMIN — HEPARIN SODIUM 5000 UNITS: 5000 INJECTION INTRAVENOUS; SUBCUTANEOUS at 05:40

## 2022-01-30 RX ADMIN — LEVALBUTEROL HYDROCHLORIDE 0.63 MG: 0.63 SOLUTION RESPIRATORY (INHALATION) at 06:43

## 2022-01-30 RX ADMIN — ALBUTEROL SULFATE 2 PUFF: 90 AEROSOL, METERED RESPIRATORY (INHALATION) at 11:36

## 2022-01-30 RX ADMIN — SENNOSIDES AND DOCUSATE SODIUM 1 TABLET: 50; 8.6 TABLET ORAL at 11:35

## 2022-01-30 RX ADMIN — Medication 2 TABLET: at 21:17

## 2022-01-30 RX ADMIN — FUROSEMIDE 40 MG: 10 INJECTION, SOLUTION INTRAMUSCULAR; INTRAVENOUS at 11:37

## 2022-01-30 RX ADMIN — Medication 500 MG: at 11:34

## 2022-01-30 RX ADMIN — ACETAMINOPHEN 975 MG: 325 TABLET, FILM COATED ORAL at 05:39

## 2022-01-30 RX ADMIN — PRAVASTATIN SODIUM 40 MG: 40 TABLET ORAL at 16:41

## 2022-01-30 RX ADMIN — ACETAMINOPHEN 975 MG: 325 TABLET, FILM COATED ORAL at 21:16

## 2022-01-30 RX ADMIN — FUROSEMIDE 40 MG: 10 INJECTION, SOLUTION INTRAMUSCULAR; INTRAVENOUS at 16:39

## 2022-01-30 RX ADMIN — FELODIPINE 5 MG: 2.5 TABLET, FILM COATED, EXTENDED RELEASE ORAL at 11:32

## 2022-01-30 RX ADMIN — DULOXETINE HYDROCHLORIDE 60 MG: 60 CAPSULE, DELAYED RELEASE ORAL at 11:34

## 2022-01-30 RX ADMIN — GABAPENTIN 300 MG: 300 CAPSULE ORAL at 11:33

## 2022-01-30 RX ADMIN — HEPARIN SODIUM 5000 UNITS: 5000 INJECTION INTRAVENOUS; SUBCUTANEOUS at 21:16

## 2022-01-30 RX ADMIN — Medication 100 MCG: at 11:36

## 2022-01-30 RX ADMIN — FINASTERIDE 5 MG: 5 TABLET, FILM COATED ORAL at 11:33

## 2022-01-30 NOTE — CONSULTS
NICK Guzmán  Attending, Orthopaedic Surgery  Foot and Ankle  [facility]        ORTHOPAEDIC FOOT AND ANKLE CLINIC VISIT     Assessment:   Right intertrochanteric femur fracture         Plan:   · The patient verbalized understanding of exam findings and treatment plan  We engaged in the shared decision-making process and treatment options were discussed at length with the patient  Surgical and conservative management discussed today along with risks and benefits  · Plan was for right hip cephalomedullary nail this AM   Patient is only satting 86% on 6L of 02  Per trauma ECHO Nunez, the patient has new wheezing on auscultation of his lungs this AM   He also has acute on chronic renal failure  Most recent ECHO in 11/2021 showed significant AS and Ejection fraction of 55%  · After discussion with Anesthesia Dr Seun Rivera and Trauma ECHO Alvarado, we all agree that medical optimization prior to OR is appropriate  · Patient will be seen by Cardiology and Nephrology today  · NPO p MN for surgery tomorrow   · The patient was consented for the above surgery    CONSENT FOR BONY PROCEDURES:   Patient understands that there is no guarantee that the surgery will relieve all of His pain and also understands that there may be a prolonged course of protected weight-bearing status required which will restrict them from driving and other activities as discussed today  Patient recognizes that there are risks with surgery including bleeding, numbness, nerve irritation, wound complications, infection, continued pain, joint stiffness, malunion, nonunion, anesthetic complications, death, failure of procedure and possible need for further surgery  The patient understands that there is no guarantee that this surgery will relieve all of His pain and symptoms  Patient understands that there is no guarantee that they will return to full function after the procedure   Patient has provided informed consent for the procedure  History of Present Illness:   Chief Complaint:   Chief Complaint   Patient presents with    Fall     slipped and fell on ice - right hip pain; denies hitting head     Maribell Telles is a 80 y o  male who is being seen for right Intertrochanteric femur fracture  Slipped on Ice yesterday  Pain is localized at right hip with minimal radiating and described as sharp and severe  Patient denies numbness, tingling or radicular pain  Denies history of neuropathy  Patient is a former smoke with COPD, does have diabetes and does take blood thinners (Xarelto)  Patient denies family history of anesthesia complications and has not had any complications with anesthesia  Prior treatment   · NSAIDsYes    · Injections No   · Bracing/Orthotics No   · Physical Therapy No     Orthopedic Surgical History:   See below    Past Medical, Surgical and Social History:  Past Medical History:  has a past medical history of Arthritis, Atrial flutter ([facility] [address] ), Chronic kidney disease, Coronary artery disease, Fluid retention, Gout, Heart failure ([facility] [address] ), Hypertension, Pacemaker, Pulmonary emphysema ([facility] [address] ), Radiculopathy, Shortness of breath, and Sleep apnea  Problem List: does not have any pertinent problems on file  Past Surgical History:  has a past surgical history that includes Angioplasty; Cholecystectomy; Eye surgery; Cardiac surgery; Knee arthroscopy w/ meniscal repair (Left); Epidural block injection (N/A, 5/26/2016); Lumbar epidural injection (N/A, 3/17/2016); Coronary angioplasty with stent; Cardiac pacemaker placement; and Epidural block injection (N/A, 2/14/2019)  Family History: family history includes Cancer (age of onset: 80) in his mother; Heart disease in his father and mother; Hypertension in his mother  Social History:  reports that he quit smoking about 2 years ago  His smoking use included cigarettes  He has a 50 00 pack-year smoking history   He has never used smokeless tobacco  He reports that he does not drink alcohol and does not use drugs  Current Medications:   Scheduled Meds:  Current Facility-Administered Medications   Medication Dose Route Frequency Provider Last Rate    acetaminophen  975 mg Oral Q8H [address] Josep Alvarado PA-C      albuterol  2 puff Inhalation Q6H PRN Colby Ramirez PA-C      allopurinol  200 mg Oral Daily Josep Alvarado PA-C      ascorbic acid  500 mg Oral Daily Josep Alvarado PA-C      calcium carbonate-vitamin D  2 tablet Oral HS Colby Ramirez PA-C      cholecalciferol  1,000 Units Oral Daily Josep Alvarado PA-C      cyanocobalamin  100 mcg Oral Daily Josep Alvarado PA-C      DULoxetine  60 mg Oral Daily Josep Alvarado PA-C      felodipine  5 mg Oral Daily Josep Alvarado PA-C      finasteride  5 mg Oral Daily Josep Alvarado PA-C      fluticasone-vilanterol  1 puff Inhalation Daily Josep Alvarado PA-C      furosemide  40 mg Intravenous Once Colby Ramirez PA-C      gabapentin  300 mg Oral Daily Josep Alvarado PA-C      gabapentin  300 mg Oral BID Colby Ramirez PA-C      glucosamine sulfate  500 mg Oral BID Rafat Richardson MD      heparin (porcine)  5,000 Units Subcutaneous [facility] Janusz Alarcon      HYDROmorphone  0 2 mg Intravenous Q4H PRN Colby Ramirez PA-C      levalbuterol  0 63 mg Nebulization Q8H PRN Coreen Farnsworth PA-C      naloxone  0 04 mg Intravenous Q1MIN PRN Colby Ramirez PA-C      oxyCODONE  2 5 mg Oral Q4H PRN Colby Ramirez PA-C      oxyCODONE  5 mg Oral Q4H PRN Colby Ramirez PA-C      polyethylene glycol  17 g Oral Daily PRN Colby Ramirez PA-C      pravastatin  40 mg Oral Daily With VANESA Oneal      senna-docusate sodium  1 tablet Oral BID Colby Ramirez PA-C       Continuous Infusions:   PRN Meds: albuterol    HYDROmorphone    levalbuterol    naloxone    oxyCODONE    oxyCODONE    polyethylene glycol    Allergies: has No Known Allergies       Review of Systems:  General- denies fever/chills  HEENT- denies hearing loss or sore throat  Eyes- denies eye pain or visual disturbances, denies red eyes  Respiratory- denies cough or SOB  Cardio- denies chest pain or palpitations  GI- denies abdominal pain  Endocrine- denies urinary frequency  Urinary- denies pain with urination  Musculoskeletal- Negative except noted above  Skin- denies rashes or wounds  Neurological- denies dizziness or headache  Psychiatric- denies anxiety or difficulty concentrating    Physical Exam:   /56 (BP Location: Left arm)   Pulse 82   Temp 98 1 °F (36 7 °C) (Oral)   Resp 16   SpO2 (!) 86%   General/Constitutional: No apparent distress: well-nourished and well developed  Eyes: normal ocular motion  Cardio: RRR, Normal S1S2, No m/r/g  Lymphatic: No appreciable lymphadenopathy  Respiratory: Non-labored breathing, CTA b/l no w/c/r  Vascular: No edema, swelling or tenderness, except as noted in detailed exam   Integumentary: No impressive skin lesions present, except as noted in detailed exam   Neuro: No ataxia or tremors noted  Psych: Normal mood and affect, oriented to person, place and time  Appropriate affect  Musculoskeletal: Normal, except as noted in detailed exam and in HPI  Examination    Right    Gait Not assessed   Musculoskeletal LLE short and externally rotated    Skin Normal       Nails Normal    Range of Motion  Not assessed due to fracture    Stability Stable    Muscle Strength 5/5 tibialis anterior  5/5 gastrocnemius-soleus  5/5 posterior tibialis  5/5 peroneal/eversion strength  5/5 EHL  5/5 FHL    Neurologic Normal    Sensation  Intact to light touch throughout sural, saphenous, superficial peroneal, deep peroneal and medial/lateral plantar nerve distributions  Franklin-Sonia 5 07 filament (10g) testing deferred      Cardiovascular Brisk capillary refill < 2 seconds,intact DP and PT pulses    Special Tests None      Imaging Studies:   3 views of the right hip were taken, reviewed and interpreted independently that demonstrate intertrochanteric femur fracture  Reviewed by me personally  Celedonio Lemmings Lachman, MD  Foot & Ankle Surgery   Department of [address] [place] [address]      I personally performed the service  Celedonio Lemmings Lachman, MD

## 2022-01-30 NOTE — PHYSICAL THERAPY NOTE
Physical Therapy Cancellation Note         01/30/22 0817   Note Type   Note type Cancelled Session   Additional Comments PT orders noted and chart reviewed  Pt is currently pending right hip cephalomedullary nail tentatively planned for tomorrow per notes  Will hold PT evaluation until post surgery with appropriate activity orders and weight bear status          Keke Rendon, PT,DPT

## 2022-01-30 NOTE — ED NOTES
Pt placed on 4L nasal cannula with spo2 of 88%, pt reports that before he quite smoking he smoked for 60 plus years        Kalia Guaman RN  01/29/22 2104

## 2022-01-30 NOTE — ASSESSMENT & PLAN NOTE
Lab Results   Component Value Date    EGFR 32 01/30/2022    EGFR 36 01/29/2022    EGFR 45 11/24/2021    CREATININE 1 89 (H) 01/30/2022    CREATININE 1 70 (H) 01/29/2022    CREATININE 1 57 (H) 01/05/2022     - Patient with history of chronic hypertension   - Monitor blood pressures with goal systolic pressure less than 160  Will allow for some permissive hypertension to avoid hypotension in setting of chronic kidney disease and mildly elevated creatinine above baseline  - Await Cardiology and Nephrology evaluation and recommendations   - Outpatient follow-up with PCP per routine

## 2022-01-30 NOTE — ASSESSMENT & PLAN NOTE
Lab Results   Component Value Date    EGFR 32 01/30/2022    EGFR 36 01/29/2022    EGFR 45 11/24/2021    CREATININE 1 89 (H) 01/30/2022    CREATININE 1 70 (H) 01/29/2022    CREATININE 1 57 (H) 01/05/2022     - Patient with chronic history of CKD stage 3 with baseline creatinine appearing to be between 1 4 and 1 5   - Mild elevation of creatinine to 1 7 on presentation on 01/29/2022 and continued elevation of Cr to 1 90  - In setting of known hip fracture and anticipate operative intervention with patient being at risk for worsening SHAWNEE, will consult Nephrology for assistance with perioperative management  - Await formal Nephrology evaluation recommendations  Consult is non urgent  I did speak with Nephrology on 01/29/2022 and we will plan to hold the patient's diuretic therapy as he does not appear volume overloaded or in CHF at this time  Monitor volume status closely with daily weights and accurate I/Os    - Avoid nephrotoxic medications and hypotension   - Continue to monitor

## 2022-01-30 NOTE — ASSESSMENT & PLAN NOTE
Wt Readings from Last 3 Encounters:   01/24/22 131 kg (289 lb)   01/06/22 130 kg (286 lb)   01/04/22 133 kg (294 lb)     - Chronic history of diastolic CHF with  evidence of acute pulmonary edema  - Await [facility] evaluation and recommendations for perioperative risk assessment and assisted with perioperative management  - In setting of mildly elevated creatinine and suspected mild SHAWNEE superimposed on chronic kidney disease stage 3, Nephrology consulted and encouraging diuretic use if needed    - Monitor volume status closely with daily weights and accurate I/Os  - Continue home medication therapy aside from diuretic therapy as appropriate

## 2022-01-30 NOTE — ED NOTES
Please call daughter, BODØ, with any updates  BODØ will communicate with wife and other family members       Carolyn Houston RN  01/30/22 8601

## 2022-01-30 NOTE — CONSULTS
[facility] Kevin Salazar 80 y o  male MRN: [id number]  Unit/Bed#: MATT Handy Encounter: [id number]    ASSESSMENT and PLAN:    80 y o  male with a past medical history of CKD, hypertension, diastolic heart failure, borderline aortic stenosis, mild-to-moderate MR, mild to moderate MS, moderate TR, gout, CAD, who was admitted to [facility] on January 29th after presenting with fall  A renal consultation is requested today for assistance in the management of elevated creatinine  1) elevated creat on CKD III    - baseline creat 1 3-1 5 mg/dL  - admission creat-1 7 mg/dL slightly rising to 1 8 mg/dL  - outpt Nephrologist Dr Paramjit Johnson  - prior UA with microscopic hematuria in November 2021  -renal imaging-prior in April 2021 with right kidney no hydronephrosis or hydroureter, left kidney with simple cyst and nonobstructing calculi  Slightly elevated creatinine-etiology unclear  Check PVR  Check urinalysis  May be in the setting of volume overload? Patient had initially presented on room air and therefore initial recommendation given plan for OR was for holding diuretics given that the patient was not in volume overload per review with primary team temporarily but then patient was seen this morning and was requiring oxygen overnight, even though the patient denied shortness of breath but did have rales on exam   Chest x-ray films reviewed and showed congestion also compared to yesterday's films      Plan  - 40 IV of Lasix now-primary team is already ordering  -will give extra dose this evening if needed-reviewed with primary team  -reviewed with orthopedic and trauma team  -recommended to ortho team to review with anesthesia given worsening oxygen requirements which are new overnight  -BMP in a m   -from the renal standpoint, reviewed the risk of acute kidney injury with the patient with surgery  -check urinalysis  -check PVR  -hip fracture-per primary team  -leukocytosis-per primary team  -avoid hypotension, I/O, avoid nephrotoxic agents  -may need to consider repeat echocardiogram   Patient became volume overloaded without any intravenous fluids  The only change yesterday was holding oral diuretics  Initial x-ray was without significant congestion  Did not have significant episodes of hypertension  Currently no chest pain  2) hip fracture-acute proximal femoral fracture of the right    -per orthopedic team and trauma team    3) electrolytes-sodium and potassium stable    4) acid/base-bicarbonate stable    5) history of 90 pack year smoking    6) CHF with valvular disease    7) anemia - per primary team    HISTORY OF PRESENT ILLNESS:  Requesting Physician: Ramos Slaughter MD  Reason for Consult: elevated creat    Kathryn Britt is a 80 y o  male with a past medical history of CKD, hypertension, diastolic heart failure, borderline aortic stenosis, mild-to-moderate MR, mild to moderate MS, moderate TR, gout, CAD, who was admitted to [address] [place] [address] on January 29th after presenting with fall  A renal consultation is requested today for assistance in the management of elevated creatinine  Patient states was in usual state of health  Not on oxygen  Denies recent fevers, chills, nausea, vomiting, diarrhea  Ninety pack year smoker  Presents with a fall and was found to have concern for hip fracture      PAST MEDICAL HISTORY:  Past Medical History:   Diagnosis Date    Arthritis     [facility]rial flutter [facility] [address] [facility]     Chronic kidney disease     stage 3    Coronary artery disease     2 stents    Fluid retention     Gout     Heart failure ([facility] [address] [facility]     pacemaker    Hypertension     Pacemaker     Pulmonary emphysema ([facility] [address] [facility]     Radiculopathy     last assessed 1/28/16     Shortness of breath     exertion    Sleep apnea     c pap       PAST SURGICAL HISTORY:  Past Surgical History:   Procedure Laterality Date    ANGIOPLASTY      x2 2 stents and then replaced    CARDIAC PACEMAKER PLACEMENT      pacemaker permanent placement dual chamber / last assessed 14 / implantation     CARDIAC SURGERY      pacemaker    CHOLECYSTECTOMY      CORONARY ANGIOPLASTY WITH STENT PLACEMENT      EPIDURAL BLOCK INJECTION N/A 2016    Procedure: BLOCK / INJECTION EPIDURAL STEROID LUMBAR  L4-5;  Surgeon: Lilli Baum MD;  Location: [facility] MAIN OR;  Service:     EPIDURAL BLOCK INJECTION N/A 2019    Procedure: L4 L5 Lumbar Epidural Steroid Injection;  Surgeon: Lilli Baum MD;  Location: [facility] MAIN OR;  Service: Pain Management     EYE SURGERY      cataract left    KNEE ARTHROSCOPY W/ MENISCAL REPAIR Left     LUMBAR EPIDURAL INJECTION N/A 3/17/2016    Procedure: BLOCK / INJECTION LUMBAR  L4-5  (C-ARM);   Surgeon: Lilli Baum MD;  Location: [facility] MAIN OR;  Service:        ALLERGIES:  No Known Allergies    SOCIAL HISTORY:  Social History     Substance and Sexual Activity   Alcohol Use Never     Social History     Substance and Sexual Activity   Drug Use Never     Social History     Tobacco Use   Smoking Status Former Smoker    Packs/day: 1 00    Years: 50 00    Pack years: 50 00    Types: Cigarettes    Quit date: 3/27/2019    Years since quittin 8   Smokeless Tobacco Never Used   Tobacco Comment    quit 2021       FAMILY HISTORY:  Family History   Problem Relation Age of Onset    Cancer Mother 80    Heart disease Mother     Hypertension Mother     Heart disease Father     Diabetes Neg Hx     Stroke Neg Hx        MEDICATIONS:    Current Facility-Administered Medications:     acetaminophen (TYLENOL) tablet 975 mg, 975 mg, Oral, Q8H [facility], Josep Alvarado PA-C, 975 mg at 22 0539    albuterol (PROVENTIL HFA,VENTOLIN HFA) inhaler 2 puff, 2 puff, Inhalation, Q6H PRN, Jadon Herring PA-C    allopurinol (ZYLOPRIM) tablet 200 mg, 200 mg, Oral, Daily, Josep Alvarado PA-C, 200 mg at 22 1446    ascorbic acid (VITAMIN C) tablet 500 mg, 500 mg, Oral, Daily, Josep VANESA Alvarado, 500 mg at 01/29/22 1453    calcium carbonate-vitamin D (OSCAL-D) 500 mg-200 units per tablet 2 tablet, 2 tablet, Oral, HS, Josep Alvarado PA-C    cholecalciferol (VITAMIN D3) tablet 1,000 Units, 1,000 Units, Oral, Daily, Redd Verde PA-C, 1,000 Units at 01/29/22 1453    cyanocobalamin (VITAMIN B-12) tablet 100 mcg, 100 mcg, Oral, Daily, Josep Alvarado PA-C, 100 mcg at 01/29/22 1447    DULoxetine (CYMBALTA) delayed release capsule 60 mg, 60 mg, Oral, Daily, Josep Alvarado PA-C, 60 mg at 01/29/22 1446    felodipine (PLENDIL) 24 hr tablet 5 mg, 5 mg, Oral, Daily, Josep Alvarado PA-C, 5 mg at 01/29/22 1447    finasteride (PROSCAR) tablet 5 mg, 5 mg, Oral, Daily, Josep Alvarado PA-C, 5 mg at 01/29/22 1446    fluticasone-vilanterol (BREO ELLIPTA) 200-25 MCG/INH inhaler 1 puff, 1 puff, Inhalation, Daily, Josep Alvarado PA-C, 1 puff at 01/29/22 1447    furosemide (LASIX) injection 40 mg, 40 mg, Intravenous, Once, Josep Alvarado PA-C    gabapentin (NEURONTIN) capsule 300 mg, 300 mg, Oral, Daily, Josep Alvarado PA-C, 300 mg at 01/29/22 1806    gabapentin (NEURONTIN) capsule 300 mg, 300 mg, Oral, BID, Josep Alvarado PA-C, 300 mg at 01/29/22 1806    glucosamine sulfate capsule 500 mg, 500 mg, Oral, BID, Dilshad Zhang MD, 500 mg at 01/29/22 1807    heparin (porcine) subcutaneous injection 5,000 Units, 5,000 Units, Subcutaneous, Q8H [facility], 5,000 Units at 01/30/22 0540 **AND** Platelet count, , , Once, Josep Alvarado PA-C    HYDROmorphone (DILAUDID) injection 0 2 mg, 0 2 mg, Intravenous, Q4H PRN, Redd Mote, PA-C    levalbuterol (XOPENEX) inhalation solution 0 63 mg, 0 63 mg, Nebulization, Q8H PRN, Mark Rivero PA-C, 0 63 mg at 01/30/22 0643    naloxone (NARCAN) 0 04 mg/mL syringe 0 04 mg, 0 04 mg, Intravenous, Q1MIN PRN, Redd Verde PA-C    oxyCODONE (ROXICODONE) IR tablet 2 5 mg, 2 5 mg, Oral, Q4H PRN, Redd Verde PA-C    oxyCODONE (ROXICODONE) IR tablet 5 mg, 5 mg, Oral, Q4H PRN, Josep Alvarado PA-C, 5 mg at 01/30/22 0541    polyethylene glycol (MIRALAX) packet 17 g, 17 g, Oral, Daily PRN, Dixie Dai PA-C    pravastatin (PRAVACHOL) tablet 40 mg, 40 mg, Oral, Daily With Dinner, Josep Alvarado PA-C, 40 mg at 01/29/22 1807    senna-docusate sodium (SENOKOT S) 8 6-50 mg per tablet 1 tablet, 1 tablet, Oral, BID, Dixie Dai PA-C, 1 tablet at 01/29/22 1806    Current Outpatient Medications:     albuterol (Ventolin HFA) 90 mcg/act inhaler, Inhale 2 puffs every 6 (six) hours as needed for wheezing, Disp: 3 Inhaler, Rfl: 3    allopurinol (ZYLOPRIM) 100 mg tablet, Take 2 tablets (200 mg total) by mouth daily, Disp: 180 tablet, Rfl: 3    ascorbic acid (VITAMIN C) 500 mg tablet, Take 500 mg by mouth daily  , Disp: , Rfl:     B Complex Vitamins (B COMPLEX 1 PO), Take 1 tablet by mouth daily  , Disp: , Rfl:     Biotin (BIOTIN 5000) 5 MG CAPS, Take 1,000 mcg by mouth daily  , Disp: , Rfl:     calcium carbonate-vitamin D (OSCAL-D) 500 mg-200 units per tablet, Take 2 tablets by mouth daily at bedtime  , Disp: , Rfl:     cholecalciferol (VITAMIN D3) 1,000 units tablet, Take 1,000 Units by mouth daily, Disp: , Rfl:     clopidogrel (PLAVIX) 75 mg tablet, Take 1 tablet (75 mg total) by mouth daily, Disp: 90 tablet, Rfl: 1    colchicine (COLCRYS) 0 6 mg tablet, Take 1 tablet (0 6 mg total) by mouth 2 (two) times a day Take twice daily x 7 days as needed for gout (Patient taking differently: Take 0 6 mg by mouth 2 (two) times a day as needed Take twice daily x 7 days as needed for gout ), Disp: 30 tablet, Rfl: 5    Cranberry 1000 MG CAPS, Take 1 tablet by mouth 2 (two) times a day , Disp: , Rfl:     cyanocobalamin 1000 MCG tablet, Take 100 mcg by mouth daily, Disp: , Rfl:     dextromethorphan-guaiFENesin (ROBITUSSIN DM)  mg/5 mL syrup, Take 10 mL by mouth every 4 (four) hours as needed for cough, Disp: 118 mL, Rfl: 0    docusate sodium (COLACE) 100 mg capsule, Take 3 capsules (300 mg total) by mouth daily (Patient taking differently: Take 300 mg by mouth as needed  ), Disp: 90 capsule, Rfl: 5    DULoxetine (CYMBALTA) 60 mg delayed release capsule, Take 1 capsule (60 mg total) by mouth daily, Disp: 90 capsule, Rfl: 1    felodipine (PLENDIL) 5 mg 24 hr tablet, Take 1 tablet (5 mg total) by mouth daily, Disp: 90 tablet, Rfl: 1    finasteride (PROSCAR) 5 mg tablet, Take 1 tablet (5 mg total) by mouth daily, Disp: 90 tablet, Rfl: 3    Flaxseed, Linseed, (EQL FLAX SEED OIL) 1000 MG CAPS, Take by mouth daily  , Disp: , Rfl:     fluocinonide (LIDEX) 0 05 % cream, Apply topically 2 (two) times a day Prn leg rash, short term (Patient taking differently: Apply topically as needed Prn leg rash, short term), Disp: 120 g, Rfl: 2    furosemide (LASIX) 40 mg tablet, Take 1 tablet (40 mg total) by mouth 2 (two) times a day, Disp: 60 tablet, Rfl: 0    gabapentin (Neurontin) 300 mg capsule, 300mg in am, 600mg in pm, Disp: 270 capsule, Rfl: 1    glucosamine-chondroitin 500-400 MG tablet, Take 1 tablet by mouth 2 (two) times a day , Disp: , Rfl:     rivaroxaban (XARELTO) 15 mg tablet, Take 1 tablet (15 mg total) by mouth daily with breakfast (Patient taking differently: Take 20 mg by mouth daily at bedtime ), Disp: , Rfl:     simvastatin (ZOCOR) 20 mg tablet, Take 1 tablet (20 mg total) by mouth daily at bedtime, Disp: 90 tablet, Rfl: 1    spironolactone (ALDACTONE) 25 mg tablet, 25 mg daily  , Disp: , Rfl:     Farxiga 5 MG TABS, , Disp: , Rfl:     fluticasone-umeclidinium-vilanterol (Trelegy Ellipta) 200-62 5-25 MCG/INH AEPB inhaler, Inhale 1 puff daily Rinse mouth after use , Disp: 180 blister, Rfl: 3    REVIEW OF SYSTEMS:    All the systems were reviewed and were negative except as documented on the HPI      PHYSICAL EXAM:  Current Weight:    First Weight:    Vitals:    01/29/22 2053 01/29/22 2105 01/30/22 0500 01/30/22 0743   BP: 119/80  110/63 118/56   BP Location: Left arm   Left arm   Pulse: 74  81 82   Resp:   15 16   Temp: TempSrc:       SpO2: (!) 65% (!) 88% (!) 88% (!) 86%       Intake/Output Summary (Last 24 hours) at 1/30/2022 0805  Last data filed at 1/30/2022 0201  Gross per 24 hour   Intake --   Output 1320 ml   Net -1320 ml     Physical Exam    General: NAD  Skin: no rash  Eyes: anicteric sclera  ENT:  Slightly dry mucous membrane  Neck: supple  Chest: CTA b/l, no ronchii, no wheeze, no rubs, diminished intake at bases fine lateral rales  CVS: s1s2, no murmur, no gallop, no rub  Abdomen: soft, nontender, nl sounds  Extremities:  Trace to 1+ edema LE b/l  : no denney  Neuro: AAOX3  Psych: normal affect      Invasive Devices:      Lab Results:   Results from last 7 days   Lab Units 01/30/22  0537 01/29/22  1016   WBC Thousand/uL 13 15* 9 20   HEMOGLOBIN g/dL 8 9* 9 9*   HEMATOCRIT % 30 6* 33 7*   PLATELETS Thousands/uL 237 238   POTASSIUM mmol/L 4 2 3 8   CHLORIDE mmol/L 101 100   CO2 mmol/L 26 28   BUN mg/dL 41* 38*   CREATININE mg/dL 1 89* 1 70*   CALCIUM mg/dL 9 1 9 3

## 2022-01-30 NOTE — ASSESSMENT & PLAN NOTE
- Acute right intertrochanteric femur fracture, present on admission   - Appreciate Orthopedic surgery evaluation and recommendations  - Patient is not cleared to go to OR from trauma standpoint due to acute increase in oxygen demands this morning to 6 L O2 NC at 80-85%  - For OR clearance, patient's respiratory status must improve  Respiratory protocol and treatments initiated  - Cardiology consultation pending for perioperative risk assessment and assistance with management in setting of significant baseline heart disease  - Continue to hold home Xarelto  Evaluate post-operatively if patient can resume if Hgb is stable  - Maintain non-weightbearing status on the right lower extremity   - Monitor right lower extremity neurovascular exam   - Continue multimodal analgesic regimen  - Initiate chemical DVT prophylaxis  - PT and OT evaluation and treatment as indicated

## 2022-01-30 NOTE — ASSESSMENT & PLAN NOTE
- Patient with chronic significant history of CAD  Patient follows with Dr Shaista Irving  - Await [facility]'s Cardiology evaluation and recommendations for perioperative risk assessment and assisted with perioperative management   - No evidence of acute cardiac event at this time  - EKG indicates ventricular pacing rhythm  - Continue home medication therapy as appropriate  - Patient also on Plavix for anti-platelet therapy; this will be held in anticipation of orthopedic operative intervention

## 2022-01-30 NOTE — ED NOTES
Evaluated by Janessa Jc trauma PA, pts O2 sats 85-86% on 6 liters, appears in no resp distress   Trauma will consult anesthesia regarding surgery      Akila Rae RN  01/30/22 6153

## 2022-01-30 NOTE — PROGRESS NOTES
[facility]  Progress Note Maribell Mercury 1940, 80 y o  male MRN: [id number]  Unit/Bed#: [place] [address] Encounter: [id number]  Primary Care Provider: Luis Morgan DO   Date and time admitted to hospital: 1/29/2022  9:34 AM    Fall  Assessment & Plan  - Status post mechanical fall on ice with the below noted injuries and issues  - Fall precautions  - Geriatric Medicine consultation for evaluation, medication review and recommendations   - PT and OT evaluation and treatment as indicated  - Case Management consultation for disposition planning  CAD (coronary artery disease)  Assessment & Plan  - Patient with chronic significant history of CAD  Patient follows with Dr Kishan Cespedes  - Await [facility] evaluation and recommendations for perioperative risk assessment and assisted with perioperative management   - No evidence of acute cardiac event at this time  - EKG indicates ventricular pacing rhythm  - Continue home medication therapy as appropriate  - Patient also on Plavix for anti-platelet therapy; this will be held in anticipation of orthopedic operative intervention  Stage 3a chronic kidney disease [facility])  Assessment & Plan  Lab Results   Component Value Date    EGFR 32 01/30/2022    EGFR 36 01/29/2022    EGFR 45 11/24/2021    CREATININE 1 89 (H) 01/30/2022    CREATININE 1 70 (H) 01/29/2022    CREATININE 1 57 (H) 01/05/2022     - Patient with chronic history of CKD stage 3 with baseline creatinine appearing to be between 1 4 and 1 5   - Mild elevation of creatinine to 1 7 on presentation on 01/29/2022 and continued elevation of Cr to 1 90  - In setting of known hip fracture and anticipate operative intervention with patient being at risk for worsening SHAWNEE, will consult Nephrology for assistance with perioperative management  - Await formal Nephrology evaluation recommendations  Consult is non urgent    I did speak with Nephrology on 01/29/2022 and we will plan to hold the patient's diuretic therapy as he does not appear volume overloaded or in CHF at this time  Monitor volume status closely with daily weights and accurate I/Os  - Avoid nephrotoxic medications and hypotension   - Continue to monitor    Lumbar radiculopathy  Assessment & Plan  - Patient with chronic lumbar radiculopathy and pain in his legs  Patient reports acutely worsened pain in his calves and feet since his fall   - Neurovascular exam intact on initial evaluation   - Pending T and L-spine x-rays to evaluate for acute injury, though patient has no midline or paraspinal tenderness at this time  If x-rays negative, no further workup indicated at this time and less change in neurologic exam   - Continue multimodal analgesic regimen including gabapentin  - Outpatient follow-up with PCP and pain provider  Chronic a-fib (HCC)  Assessment & Plan  - Chronic history of atrial fibrillation   - Patient normally anticoagulated with Xarelto  Will hold anticoagulant therapy in anticipation of orthopedic operative intervention   - Plan to resume anticoagulation postoperatively  - Monitor for adequate heart rate control   - Continue home medication regimen as appropriate otherwise  - Await Cardiology evaluation and recommendations   - Outpatient follow-up per team     Chronic diastolic CHF (congestive heart failure) ([facility] [address] )  Assessment & Plan  Wt Readings from Last 3 Encounters:   01/24/22 131 kg (289 lb)   01/06/22 130 kg (286 lb)   01/04/22 133 kg (294 lb)     - Chronic history of diastolic CHF with  evidence of acute pulmonary edema  - Await [facility] evaluation and recommendations for perioperative risk assessment and assisted with perioperative management    - In setting of mildly elevated creatinine and suspected mild SHAWNEE superimposed on chronic kidney disease stage 3, Nephrology consulted and encouraging diuretic use if needed    - Monitor volume status closely with daily weights and accurate I/Os   - Continue home medication therapy aside from diuretic therapy as appropriate  Benign hypertension with chronic kidney disease, stage III [facility])  Assessment & Plan  Lab Results   Component Value Date    EGFR 32 01/30/2022    EGFR 36 01/29/2022    EGFR 45 11/24/2021    CREATININE 1 89 (H) 01/30/2022    CREATININE 1 70 (H) 01/29/2022    CREATININE 1 57 (H) 01/05/2022     - Patient with history of chronic hypertension   - Monitor blood pressures with goal systolic pressure less than 160  Will allow for some permissive hypertension to avoid hypotension in setting of chronic kidney disease and mildly elevated creatinine above baseline  - Await Cardiology and Nephrology evaluation and recommendations   - Outpatient follow-up with PCP per routine  Chronic pain syndrome  Assessment & Plan  - Patient with chronic pain syndrome   - Continue multimodal analgesic regimen with additional agents added/adjustments made in setting of acute pain secondary to traumatic injury  - Bowel regimen while on opioid therapy  - APS consult to assist with acute on chronic pain and anticipated postoperative pain  - Outpatient follow-up with PCP and pain provider  * Closed fracture of right femur [facility])  Assessment & Plan  - Acute right intertrochanteric femur fracture, present on admission   - Appreciate Orthopedic surgery evaluation and recommendations  - Patient is not cleared to go to OR from trauma standpoint due to acute increase in oxygen demands this morning to 6 L O2 NC at 80-85%  - For OR clearance, patient's respiratory status must improve  Respiratory protocol and treatments initiated  - Cardiology consultation pending for perioperative risk assessment and assistance with management in setting of significant baseline heart disease  - Continue to hold home Xarelto   Evaluate post-operatively if patient can resume if Hgb is stable  - Maintain non-weightbearing status on the right lower extremity   - Monitor right lower extremity neurovascular exam   - Continue multimodal analgesic regimen  - Initiate chemical DVT prophylaxis  - PT and OT evaluation and treatment as indicated  Not cleared for OR at this point as patient is very high risk of prolonged intubation due to acute respiratory failure  PLAN:  Breathing treatment, Respiratory protocol  STAT CXR  Cardiology and Nephrology consults      TERTIARY TRAUMA SURVEY NOTE    Prophylaxis: Heparin    Disposition pending orthopedic surgery intervention and recommendations  Discharge is also pending PT and OT evaluations and treatment post-operatively    Code status:  Level 1 - Full Code    Consultants: Orthopedic surgery, Geriatrics      SUBJECTIVE:     Transfer from: None  Mechanism of Injury:Fall    Chief Complaint: "I'm always like this"    HPI/Last 24 hour events: Patient reports that his breathing is often labored  He reports that his right hip is in pain, and the pain medications dont help  He denies chest pain, abdominal pain, numbness or tingling      Active medications:           Current Facility-Administered Medications:     acetaminophen (TYLENOL) tablet 975 mg, 975 mg, Oral, Q8H JOSE, 975 mg at 01/30/22 0539    albuterol (PROVENTIL HFA,VENTOLIN HFA) inhaler 2 puff, 2 puff, Inhalation, Q6H PRN    allopurinol (ZYLOPRIM) tablet 200 mg, 200 mg, Oral, Daily, 200 mg at 01/29/22 1446    ascorbic acid (VITAMIN C) tablet 500 mg, 500 mg, Oral, Daily, 500 mg at 01/29/22 1453    calcium carbonate-vitamin D (OSCAL-D) 500 mg-200 units per tablet 2 tablet, 2 tablet, Oral, HS    cholecalciferol (VITAMIN D3) tablet 1,000 Units, 1,000 Units, Oral, Daily, 1,000 Units at 01/29/22 1453    cyanocobalamin (VITAMIN B-12) tablet 100 mcg, 100 mcg, Oral, Daily, 100 mcg at 01/29/22 1447    DULoxetine (CYMBALTA) delayed release capsule 60 mg, 60 mg, Oral, Daily, 60 mg at 01/29/22 1446    felodipine (PLENDIL) 24 hr tablet 5 mg, 5 mg, Oral, Daily, 5 mg at 01/29/22 1447    finasteride (PROSCAR) tablet 5 mg, 5 mg, Oral, Daily, 5 mg at 01/29/22 1446    fluticasone-vilanterol (BREO ELLIPTA) 200-25 MCG/INH inhaler 1 puff, 1 puff, Inhalation, Daily, 1 puff at 01/29/22 1447    furosemide (LASIX) injection 40 mg, 40 mg, Intravenous, Once    gabapentin (NEURONTIN) capsule 300 mg, 300 mg, Oral, Daily, 300 mg at 01/29/22 1806    gabapentin (NEURONTIN) capsule 300 mg, 300 mg, Oral, BID, 300 mg at 01/29/22 1806    glucosamine sulfate capsule 500 mg, 500 mg, Oral, BID, 500 mg at 01/29/22 1807    heparin (porcine) subcutaneous injection 5,000 Units, 5,000 Units, Subcutaneous, Q8H [address], 5,000 Units at 01/30/22 0540 **AND** Platelet count, , , Once    HYDROmorphone (DILAUDID) injection 0 2 mg, 0 2 mg, Intravenous, Q4H PRN    levalbuterol (XOPENEX) inhalation solution 0 63 mg, 0 63 mg, Nebulization, Q8H PRN, 0 63 mg at 01/30/22 0643    naloxone (NARCAN) 0 04 mg/mL syringe 0 04 mg, 0 04 mg, Intravenous, Q1MIN PRN    oxyCODONE (ROXICODONE) IR tablet 2 5 mg, 2 5 mg, Oral, Q4H PRN    oxyCODONE (ROXICODONE) IR tablet 5 mg, 5 mg, Oral, Q4H PRN, 5 mg at 01/30/22 0541    polyethylene glycol (MIRALAX) packet 17 g, 17 g, Oral, Daily PRN    pravastatin (PRAVACHOL) tablet 40 mg, 40 mg, Oral, Daily With Dinner, 40 mg at 01/29/22 1807    senna-docusate sodium (SENOKOT S) 8 6-50 mg per tablet 1 tablet, 1 tablet, Oral, BID, 1 tablet at 01/29/22 1806    Current Outpatient Medications:     albuterol (Ventolin HFA) 90 mcg/act inhaler    allopurinol (ZYLOPRIM) 100 mg tablet    ascorbic acid (VITAMIN C) 500 mg tablet    B Complex Vitamins (B COMPLEX 1 PO)    Biotin (BIOTIN 5000) 5 MG CAPS    calcium carbonate-vitamin D (OSCAL-D) 500 mg-200 units per tablet    cholecalciferol (VITAMIN D3) 1,000 units tablet    clopidogrel (PLAVIX) 75 mg tablet    colchicine (COLCRYS) 0 6 mg tablet    Cranberry 1000 MG CAPS    cyanocobalamin 1000 MCG tablet    dextromethorphan-guaiFENesin (ROBITUSSIN DM)  mg/5 mL syrup   docusate sodium (COLACE) 100 mg capsule    DULoxetine (CYMBALTA) 60 mg delayed release capsule    felodipine (PLENDIL) 5 mg 24 hr tablet    finasteride (PROSCAR) 5 mg tablet    Flaxseed, Linseed, (EQL FLAX SEED OIL) 1000 MG CAPS    fluocinonide (LIDEX) 0 05 % cream    furosemide (LASIX) 40 mg tablet    gabapentin (Neurontin) 300 mg capsule    glucosamine-chondroitin 500-400 MG tablet    rivaroxaban (XARELTO) 15 mg tablet    simvastatin (ZOCOR) 20 mg tablet    spironolactone (ALDACTONE) 25 mg tablet    Farxiga 5 MG TABS    fluticasone-umeclidinium-vilanterol (Trelegy Ellipta) 200-62 5-25 MCG/INH AEPB inhaler      OBJECTIVE:     Vitals:   Vitals:    01/30/22 0500   BP: 110/63   Pulse: 81   Resp: 15   Temp:    SpO2: (!) 88%       Physical Exam:   GENERAL APPEARANCE: Patient in acute respiratory distress  HEENT: NCAT; PERRL, EOMs intact; Mucous membranes moist  CV: Regular rate and rhythm; murmur appreciated  CHEST / LUNGS: Breathing with accessory muscles; bilateral rhonchi  ABD: NABS; soft; non-distended; non-tender  : Voiding  EXT: +1 pulses bilaterally upper & lower extremities; +2 pitting edema bilateral lower extremities  NEURO: GCS 15; no focal neurologic deficits; neurovascularly intact  SKIN: Warm, dry and well perfused; no rash; no jaundice  1  Before the illness or injury that brought you to the Emergency, did you need someone to help you on a regular basis? 0=No   2  Since the illness or injury that brought you to the Emergency, have you needed more help than usual to take care of yourself? 1=Yes   3  Have you been hospitalized for one or more nights during the past 6 months (excluding a stay in the Emergency Department)? 1=Yes   4  In general, do you see well? 1=No   5  In general, do you have serious problems with your memory? 0=No   6  Do you take more than three different medications everyday?  1=Yes   TOTAL   4     Did you order a geriatric consult if the score was 2 or greater?: yes                I/O:   I/O       01/28 0701  01/29 0700 01/29 0701  01/30 0700    Urine  1320    Total Output  1320    Net  -1320                Invasive Devices: Invasive Devices  Report    Peripheral Intravenous Line            Peripheral IV 01/29/22 Right Antecubital <1 day                  Imaging:   XR chest 1 view portable    Result Date: 1/29/2022  Impression: No acute cardiopulmonary disease  Workstation performed: [id number]     XR femur 2 vw right    Result Date: 1/29/2022  Impression: Acute proximal femoral intertrochanteric fracture  Severe tricompartment osteoarthritis of the right knee  Workstation performed: [id number]     XR pelvis ap only 1 or 2 vw    Result Date: 1/29/2022  Impression: Acute intertrochanteric fracture of the right proximal femur  No associated hip dislocation   Workstation performed: [id number]       Labs:   CBC:   Lab Results   Component Value Date    WBC 13 15 (H) 01/30/2022    HGB 8 9 (L) 01/30/2022    HCT 30 6 (L) 01/30/2022    MCV 84 01/30/2022     01/30/2022    MCH 24 5 (L) 01/30/2022    MCHC 29 1 (L) 01/30/2022    RDW 16 7 (H) 01/30/2022    MPV 9 2 01/30/2022    NRBC 0 01/29/2022     CMP:   Lab Results   Component Value Date     01/30/2022    CO2 26 01/30/2022    BUN 41 (H) 01/30/2022    CREATININE 1 89 (H) 01/30/2022    CALCIUM 9 1 01/30/2022    EGFR 32 01/30/2022

## 2022-01-30 NOTE — CONSULTS
Consultation - Cardiology   Daniela Spence 80 y o  male MRN: [id number]  Unit/Bed#: MATT Handy Encounter: [id number]  01/30/22  7:46 AM    Assessment/ Plan:  Acute right intertrochanteric femur fracture secondary to mechanical fall  Patient has multiple cardiac risk factors including CAD, chronic atrial fibrillation aortic stenosis, abdominal aortic aneurysm and chronic stable diastolic heart failure  Recommend checking 2D echocardiogram to assess LV function  Careful fluid hydration pre, intraoperative and post operative will be  paramount  Resumption of Lasix Xarelto and Plavix as soon as possible post procedure should be carefully managed  CKD stage 3 baseline creatinine 1 4-1 7 GFR baseline 36-44  nephrology consulted hold Lasix and nephrotoxic agents    Acute on chronic diastolic heart failure -  Lasix has been on hold and he has right sided rales,  Needs diuresis,  Accurate I&O  Patient is incontinent of urine  Nurses unable to apply [place] cath (secondary to penis size)  Recommend inserting denney  Moderate to severe aortic  - check 2D echocardiogram   Family interested in referral to TAVR program when pt recovers from his surgery  CAD - Plavix on hold for potential surgical intervention resume post procedure as soon as possible  Chronic atrial fibrillation on renal dose Xarelto - currently on hold for potential surgical intervention  - resume post operative     Status post permanent pacemaker [facility] implanted  2012)  [employer] rep will interrogate the device this morning  Cannot find any interrogations from office - patient follows with Dr Jose J Hanks  Pacer interrogated  Dual chamber pacer  Programmed VVI 70 secondary to chronic atrial fibrillation  Battery life 6 months  Patient  Follows with Dr Jose J Hanks         History of Present Illness   Physician Requesting Consult: Vincent Rizzo MD    Reason for Consult / Principal Problem:  Preoperative clearance    HPI: Sebastian Glez Alida Dudley is a 80y o  year old male who was plowing snow yesterday  He got off the vehicle, slipped on the ice and fell to the ground striking his right elbow and hip  There  was no LOC and no head strike  Patient noted right hip pain after the fall  Patient does have chronic bilateral  lower calf and foot pain  Patient's past medical history includes coronary artery disease status post PCI and drug-eluting stentsRalph Fabens [place] Rx 3 5 x 18 to proximal RCA and Xience [place] Rx 3 0 x 33 mm to mid LAD)May of 2019 by Dr Giovani Ponce  Chronic atrial fibrillation (Xarelto), hypertension, chronic kidney disease stage 3 chronic, moderate to severe aortic stenosis with an aortic valve area of 1 0 centimeter squared peak gradient was 65 and mean gradient 35 mmHg, pulmonary hypertension PA pressure is 50-60 mm Hg on ECHO, chronic diastolic heart failure, abdominal aortic aneurysm without rupture, mixed lipidemia, status post pacemaker [employer]) and obstructive sleep apnea  Patient is on Plavix 75 mg daily in Xarelto 15 mg daily  No aspirin  Patient wife states that he has had 2 other stumbling falls in the house this week  Patient has been taking 1200 mg Gabapentin  For his leg pains but recently quit taking because he felt the medication was doing nothing  Inpatient consult to Cardiology  Consult performed by: NAVA Prieto  Consult ordered by: Ayo Escoto PA-C      Office Cardiologist: Dr Bobbi Ford    EK% V pacing   cardiac congestion R>L    Review of Systems   Constitutional: Negative  HENT: Negative  Respiratory: Positive for shortness of breath  Cardiovascular: Positive for leg swelling  Gastrointestinal: Negative  Endocrine: Negative  Genitourinary: Positive for difficulty urinating  Skin: Negative  Neurological: Negative  Hematological: Negative  Psychiatric/Behavioral: Negative          Historical Information   Past Medical History: Diagnosis Date    Arthritis     At[facility] flutter [facility] [address] [facility]     Chronic kidney disease     stage 3    Coronary artery disease     2 stents    Fluid retention     Gout     Heart failure (HCC)     pacemaker    Hypertension     Pacemaker     Pulmonary emphysema ([facility] [address] [facility]     Radiculopathy     last assessed 16     Shortness of breath     exertion    Sleep apnea     c pap     Past Surgical History:   Procedure Laterality Date    ANGIOPLASTY      x2 2 stents and then replaced    CARDIAC PACEMAKER PLACEMENT      pacemaker permanent placement dual chamber / last assessed 14 / implantation     CARDIAC SURGERY      pacemaker    CHOLECYSTECTOMY      CORONARY ANGIOPLASTY WITH STENT PLACEMENT      EPIDURAL BLOCK INJECTION N/A 2016    Procedure: BLOCK / INJECTION EPIDURAL STEROID LUMBAR  L4-5;  Surgeon: Harleen Camarillo MD;  Location: [place] [address] MAIN OR;  Service:     EPIDURAL BLOCK INJECTION N/A 2019    Procedure: L4 L5 Lumbar Epidural Steroid Injection;  Surgeon: Harleen Camarillo MD;  Location: [place] [address] MAIN OR;  Service: Pain Management     EYE SURGERY      cataract left    KNEE ARTHROSCOPY W/ MENISCAL REPAIR Left     LUMBAR EPIDURAL INJECTION N/A 3/17/2016    Procedure: BLOCK / INJECTION LUMBAR  L4-5  (C-ARM);   Surgeon: Harleen Camarillo MD;  Location: [facility] MAIN OR;  Service:      Social History     Substance and Sexual Activity   Alcohol Use Never     Social History     Substance and Sexual Activity   Drug Use Never     Social History     Tobacco Use   Smoking Status Former Smoker    Packs/day: 1 00    Years: 50 00    Pack years: 50 00    Types: Cigarettes    Quit date: 3/27/2019    Years since quittin 8   Smokeless Tobacco Never Used   Tobacco Comment    quit 2021       Family History:   Family History   Problem Relation Age of Onset    Cancer Mother 80    Heart disease Mother     Hypertension Mother     Heart disease Father     Diabetes Neg Hx     Stroke Neg Hx Meds/Allergies   PTA meds:   Prior to Admission Medications   Prescriptions Last Dose Informant Patient Reported? Taking? B Complex Vitamins (B COMPLEX 1 PO)  Child Yes Yes   Sig: Take 1 tablet by mouth daily  Biotin (BIOTIN 5000) 5 MG CAPS  Child Yes Yes   Sig: Take 1,000 mcg by mouth daily  Cranberry 1000 MG CAPS  Child Yes Yes   Sig: Take 1 tablet by mouth 2 (two) times a day  DULoxetine (CYMBALTA) 60 mg delayed release capsule  Child No Yes   Sig: Take 1 capsule (60 mg total) by mouth daily   Farxiga 5 MG TABS   Yes No   Flaxseed, Linseed, (EQL FLAX SEED OIL) 1000 MG CAPS  Child Yes Yes   Sig: Take by mouth daily  albuterol (Ventolin HFA) 90 mcg/act inhaler  Child No Yes   Sig: Inhale 2 puffs every 6 (six) hours as needed for wheezing   allopurinol (ZYLOPRIM) 100 mg tablet   No Yes   Sig: Take 2 tablets (200 mg total) by mouth daily   ascorbic acid (VITAMIN C) 500 mg tablet  Child Yes Yes   Sig: Take 500 mg by mouth daily  calcium carbonate-vitamin D (OSCAL-D) 500 mg-200 units per tablet  Child Yes Yes   Sig: Take 2 tablets by mouth daily at bedtime     cholecalciferol (VITAMIN D3) 1,000 units tablet  Child Yes Yes   Sig: Take 1,000 Units by mouth daily   clopidogrel (PLAVIX) 75 mg tablet  Child No Yes   Sig: Take 1 tablet (75 mg total) by mouth daily   colchicine (COLCRYS) 0 6 mg tablet  Child No Yes   Sig: Take 1 tablet (0 6 mg total) by mouth 2 (two) times a day Take twice daily x 7 days as needed for gout   Patient taking differently: Take 0 6 mg by mouth 2 (two) times a day as needed Take twice daily x 7 days as needed for gout    cyanocobalamin 1000 MCG tablet  Child Yes Yes   Sig: Take 100 mcg by mouth daily   dextromethorphan-guaiFENesin (ROBITUSSIN DM)  mg/5 mL syrup  Child No Yes   Sig: Take 10 mL by mouth every 4 (four) hours as needed for cough   docusate sodium (COLACE) 100 mg capsule  Child No Yes   Sig: Take 3 capsules (300 mg total) by mouth daily   Patient taking differently: Take 300 mg by mouth as needed     felodipine (PLENDIL) 5 mg 24 hr tablet  Child No Yes   Sig: Take 1 tablet (5 mg total) by mouth daily   finasteride (PROSCAR) 5 mg tablet  Child No Yes   Sig: Take 1 tablet (5 mg total) by mouth daily   fluocinonide (LIDEX) 0 05 % cream  Spouse/Significant Other No Yes   Sig: Apply topically 2 (two) times a day Prn leg rash, short term   Patient taking differently: Apply topically as needed Prn leg rash, short term   fluticasone-umeclidinium-vilanterol (Trelegy Ellipta) 200-62 5-25 MCG/INH AEPB inhaler  Child No No   Sig: Inhale 1 puff daily Rinse mouth after use  furosemide (LASIX) 40 mg tablet  Child No Yes   Sig: Take 1 tablet (40 mg total) by mouth 2 (two) times a day   gabapentin (Neurontin) 300 mg capsule  Child No Yes   Simg in am, 600mg in pm   glucosamine-chondroitin 500-400 MG tablet  Child Yes Yes   Sig: Take 1 tablet by mouth 2 (two) times a day  rivaroxaban (XARELTO) 15 mg tablet  Child No Yes   Sig: Take 1 tablet (15 mg total) by mouth daily with breakfast   Patient taking differently: Take 20 mg by mouth daily at bedtime    simvastatin (ZOCOR) 20 mg tablet  Child No Yes   Sig: Take 1 tablet (20 mg total) by mouth daily at bedtime   spironolactone (ALDACTONE) 25 mg tablet  Child Yes Yes   Si mg daily        Facility-Administered Medications: None     No Known Allergies    Objective   Vitals: Blood pressure 118/56, pulse 82, temperature 98 1 °F (36 7 °C), temperature source Oral, resp  rate 16, SpO2 (!) 86 %  , There is no height or weight on file to calculate BMI ,   Orthostatic Blood Pressures      Most Recent Value   Blood Pressure 118/56 filed at 2022 0743   Patient Position - Orthostatic VS Lying filed at 2022 8175          Systolic (93HKS), NDK:083 , Min:110 , EEI:008     Diastolic (68UYU), ZFL:54, Min:56, Max:80        Intake/Output Summary (Last 24 hours) at 2022 0746  Last data filed at 2022 0201  Gross per 24 hour   Intake --   Output 1320 ml   Net -1320 ml     Nearest to today 289 lb (131 kg)  01/24/2022   14 days prior 286 lb (130 kg)  01/06/2022   1 month prior 296 lb 6 4 oz (134 kg)  12/16/2021       Invasive Devices  Report    Peripheral Intravenous Line            Peripheral IV 01/29/22 Right Antecubital <1 day                    Physical Exam:  GEN: Alert and oriented x 3,but very sleepy secondary to pain medications  Well appearing and well nourished  HEENT: Sclera anicteric, conjunctivae pink, mucous membranes moist  Oropharynx clear  NECK: Supple, no carotid bruits, no significant JVD  Trachea midline, no thyromegaly  HEART: Regular rhythm, normal S1 and S2, III/VI sys murmurr    LUNGS: +rales in upper and lower geraldo fields SPO2 on 2 l O2 88-90 %  ABDOMEN: Soft, nontender, nondistended, normoactive bowel sounds  EXTREMITIES: + 1-2 edema b/L  Legs very tender palpations  NEURO: No focal findings  Normal speech  Mood and affect normal    SKIN: Normal without suspicious lesions on exposed skin  Lab Results:         CBC with diff:   Results from last 7 days   Lab Units 01/30/22  0537 01/29/22  1016   WBC Thousand/uL 13 15* 9 20   HEMOGLOBIN g/dL 8 9* 9 9*   HEMATOCRIT % 30 6* 33 7*   MCV fL 84 84   PLATELETS Thousands/uL 237 238   MCH pg 24 5* 24 8*   MCHC g/dL 29 1* 29 4*   RDW % 16 7* 16 7*   MPV fL 9 2 8 8*   NRBC AUTO /100 WBCs  --  0         CMP:   Results from last 7 days   Lab Units 01/30/22  0537 01/29/22  1016   POTASSIUM mmol/L 4 2 3 8   CHLORIDE mmol/L 101 100   CO2 mmol/L 26 28   BUN mg/dL 41* 38*   CREATININE mg/dL 1 89* 1 70*   CALCIUM mg/dL 9 1 9 3   EGFR ml/min/1 73sq m 32 36       ECHO:  09/2020  LEFT VENTRICLE:  Systolic function was normal  Ejection fraction was estimated in the range of 55 % to 60 % to be 55 %  Although no diagnostic regional wall motion abnormality was identified, this possibility cannot be completely excluded on the basis of this study    Wall thickness was mildly to moderately increased  There was mild concentric hypertrophy      RIGHT VENTRICLE:  The ventricle was mildly dilated      LEFT ATRIUM:  The atrium was moderately to markedly dilated      RIGHT ATRIUM:  The atrium was mildly to moderately dilated      MITRAL VALVE:  There was moderate annular calcification  There was mild stenosis  Mean Gradient 6-7 mm of hg  There was mild regurgitation      AORTIC VALVE:  The valve was functionally bicuspid  Leaflets exhibited normal thickness and normal cuspal separation  Transaortic velocity was increased due to valvular stenosis  There was moderate to severe stenosis  Shima 1 0 cm2, peak gradient 65, mean 35 mm of hg     TRICUSPID VALVE:  There was mild to moderate regurgitation  Estimated peak PA pressure was 55 to 60 mmHg    The findings suggest moderate pulmonary hypertension      PULMONIC VALVE:  There was trace regurgitation

## 2022-01-30 NOTE — ASSESSMENT & PLAN NOTE
- Patient with chronic lumbar radiculopathy and pain in his legs  Patient reports acutely worsened pain in his calves and feet since his fall   - Neurovascular exam intact on initial evaluation   - Pending T and L-spine x-rays to evaluate for acute injury, though patient has no midline or paraspinal tenderness at this time  If x-rays negative, no further workup indicated at this time and less change in neurologic exam   - Continue multimodal analgesic regimen including gabapentin  - Outpatient follow-up with PCP and pain provider

## 2022-01-31 ENCOUNTER — TELEPHONE (OUTPATIENT)
Dept: OTHER | Facility: OTHER | Age: 82
End: 2022-01-31

## 2022-01-31 ENCOUNTER — APPOINTMENT (INPATIENT)
Dept: RADIOLOGY | Facility: HOSPITAL | Age: 82
DRG: 480 | End: 2022-01-31
Payer: MEDICARE

## 2022-01-31 ENCOUNTER — ANESTHESIA (INPATIENT)
Dept: PERIOP | Facility: HOSPITAL | Age: 82
DRG: 480 | End: 2022-01-31
Payer: MEDICARE

## 2022-01-31 ENCOUNTER — APPOINTMENT (INPATIENT)
Dept: NON INVASIVE DIAGNOSTICS | Facility: HOSPITAL | Age: 82
DRG: 480 | End: 2022-01-31
Payer: MEDICARE

## 2022-01-31 LAB
ANION GAP SERPL CALCULATED.3IONS-SCNC: 10 MMOL/L (ref 4–13)
ANION GAP SERPL CALCULATED.3IONS-SCNC: 8 MMOL/L (ref 4–13)
AV MEAN GRADIENT: 41 MMHG
AV PEAK GRADIENT: 64 MMHG
AV VALVE AREA: 0.91 CM2
AV VELOCITY RATIO: 0.3
BASOPHILS # BLD AUTO: 0.06 THOUSANDS/ΜL (ref 0–0.1)
BASOPHILS NFR BLD AUTO: 1 % (ref 0–1)
BUN SERPL-MCNC: 49 MG/DL (ref 5–25)
BUN SERPL-MCNC: 50 MG/DL (ref 5–25)
CALCIUM SERPL-MCNC: 8.8 MG/DL (ref 8.3–10.1)
CALCIUM SERPL-MCNC: 8.8 MG/DL (ref 8.3–10.1)
CHLORIDE SERPL-SCNC: 101 MMOL/L (ref 100–108)
CHLORIDE SERPL-SCNC: 97 MMOL/L (ref 100–108)
CO2 SERPL-SCNC: 26 MMOL/L (ref 21–32)
CO2 SERPL-SCNC: 28 MMOL/L (ref 21–32)
CREAT SERPL-MCNC: 2.04 MG/DL (ref 0.6–1.3)
CREAT SERPL-MCNC: 2.16 MG/DL (ref 0.6–1.3)
DOP CALC AO PEAK VEL: 4 M/S
DOP CALC AO VTI: 87 CM
DOP CALC LVOT AREA: 2.83 CM2
DOP CALC LVOT DIAMETER: 1.9 CM
DOP CALC LVOT PEAK VEL VTI: 28 CM
DOP CALC LVOT PEAK VEL: 1.2 M/S
DOP CALC LVOT STROKE VOLUME: 79.35 CM3
EOSINOPHIL # BLD AUTO: 0.12 THOUSAND/ΜL (ref 0–0.61)
EOSINOPHIL NFR BLD AUTO: 1 % (ref 0–6)
ERYTHROCYTE [DISTWIDTH] IN BLOOD BY AUTOMATED COUNT: 17 % (ref 11.6–15.1)
GFR SERPL CREATININE-BSD FRML MDRD: 27 ML/MIN/1.73SQ M
GFR SERPL CREATININE-BSD FRML MDRD: 29 ML/MIN/1.73SQ M
GLUCOSE SERPL-MCNC: 127 MG/DL (ref 65–140)
GLUCOSE SERPL-MCNC: 166 MG/DL (ref 65–140)
HCT VFR BLD AUTO: 27.1 % (ref 36.5–49.3)
HGB BLD-MCNC: 8.1 G/DL (ref 12–17)
IMM GRANULOCYTES # BLD AUTO: 0.1 THOUSAND/UL (ref 0–0.2)
IMM GRANULOCYTES NFR BLD AUTO: 1 % (ref 0–2)
LYMPHOCYTES # BLD AUTO: 1.06 THOUSANDS/ΜL (ref 0.6–4.47)
LYMPHOCYTES NFR BLD AUTO: 9 % (ref 14–44)
MAGNESIUM SERPL-MCNC: 2.3 MG/DL (ref 1.6–2.6)
MCH RBC QN AUTO: 24.7 PG (ref 26.8–34.3)
MCHC RBC AUTO-ENTMCNC: 29.9 G/DL (ref 31.4–37.4)
MCV RBC AUTO: 83 FL (ref 82–98)
MONOCYTES # BLD AUTO: 0.75 THOUSAND/ΜL (ref 0.17–1.22)
MONOCYTES NFR BLD AUTO: 7 % (ref 4–12)
NEUTROPHILS # BLD AUTO: 9.15 THOUSANDS/ΜL (ref 1.85–7.62)
NEUTS SEG NFR BLD AUTO: 81 % (ref 43–75)
NRBC BLD AUTO-RTO: 0 /100 WBCS
PLATELET # BLD AUTO: 204 THOUSANDS/UL (ref 149–390)
PLATELET # BLD AUTO: 226 THOUSANDS/UL (ref 149–390)
PMV BLD AUTO: 9.1 FL (ref 8.9–12.7)
PMV BLD AUTO: 9.5 FL (ref 8.9–12.7)
POTASSIUM SERPL-SCNC: 3.9 MMOL/L (ref 3.5–5.3)
POTASSIUM SERPL-SCNC: 4.3 MMOL/L (ref 3.5–5.3)
RBC # BLD AUTO: 3.28 MILLION/UL (ref 3.88–5.62)
SL CV LV EF: 65
SODIUM SERPL-SCNC: 133 MMOL/L (ref 136–145)
SODIUM SERPL-SCNC: 137 MMOL/L (ref 136–145)
TR MAX PG: 54 MMHG
TRICUSPID VALVE PEAK REGURGITATION VELOCITY: 3.66 M/S
WBC # BLD AUTO: 11.24 THOUSAND/UL (ref 4.31–10.16)

## 2022-01-31 PROCEDURE — 85025 COMPLETE CBC W/AUTO DIFF WBC: CPT | Performed by: SURGERY

## 2022-01-31 PROCEDURE — G9197 ORDER FOR CEPH: HCPCS | Performed by: ORTHOPAEDIC SURGERY

## 2022-01-31 PROCEDURE — 93325 DOPPLER ECHO COLOR FLOW MAPG: CPT

## 2022-01-31 PROCEDURE — C9290 INJ, BUPIVACAINE LIPOSOME: HCPCS | Performed by: STUDENT IN AN ORGANIZED HEALTH CARE EDUCATION/TRAINING PROGRAM

## 2022-01-31 PROCEDURE — 93325 DOPPLER ECHO COLOR FLOW MAPG: CPT | Performed by: INTERNAL MEDICINE

## 2022-01-31 PROCEDURE — NC001 PR NO CHARGE: Performed by: PHYSICIAN ASSISTANT

## 2022-01-31 PROCEDURE — C1713 ANCHOR/SCREW BN/BN,TIS/BN: HCPCS | Performed by: ORTHOPAEDIC SURGERY

## 2022-01-31 PROCEDURE — 93321 DOPPLER ECHO F-UP/LMTD STD: CPT | Performed by: INTERNAL MEDICINE

## 2022-01-31 PROCEDURE — 27245 TREAT THIGH FRACTURE: CPT | Performed by: ORTHOPAEDIC SURGERY

## 2022-01-31 PROCEDURE — 83735 ASSAY OF MAGNESIUM: CPT | Performed by: INTERNAL MEDICINE

## 2022-01-31 PROCEDURE — C1769 GUIDE WIRE: HCPCS | Performed by: ORTHOPAEDIC SURGERY

## 2022-01-31 PROCEDURE — 73502 X-RAY EXAM HIP UNI 2-3 VIEWS: CPT

## 2022-01-31 PROCEDURE — 94640 AIRWAY INHALATION TREATMENT: CPT

## 2022-01-31 PROCEDURE — 99233 SBSQ HOSP IP/OBS HIGH 50: CPT | Performed by: SURGERY

## 2022-01-31 PROCEDURE — 93321 DOPPLER ECHO F-UP/LMTD STD: CPT

## 2022-01-31 PROCEDURE — 80048 BASIC METABOLIC PNL TOTAL CA: CPT | Performed by: PHYSICIAN ASSISTANT

## 2022-01-31 PROCEDURE — 99232 SBSQ HOSP IP/OBS MODERATE 35: CPT | Performed by: INTERNAL MEDICINE

## 2022-01-31 PROCEDURE — NC001 PR NO CHARGE: Performed by: ORTHOPAEDIC SURGERY

## 2022-01-31 PROCEDURE — 99222 1ST HOSP IP/OBS MODERATE 55: CPT | Performed by: NURSE PRACTITIONER

## 2022-01-31 PROCEDURE — 93308 TTE F-UP OR LMTD: CPT | Performed by: INTERNAL MEDICINE

## 2022-01-31 PROCEDURE — 0QS606Z REPOSITION RIGHT UPPER FEMUR WITH INTRAMEDULLARY INTERNAL FIXATION DEVICE, OPEN APPROACH: ICD-10-PCS | Performed by: ORTHOPAEDIC SURGERY

## 2022-01-31 PROCEDURE — 94760 N-INVAS EAR/PLS OXIMETRY 1: CPT

## 2022-01-31 PROCEDURE — 85049 AUTOMATED PLATELET COUNT: CPT | Performed by: STUDENT IN AN ORGANIZED HEALTH CARE EDUCATION/TRAINING PROGRAM

## 2022-01-31 PROCEDURE — 93308 TTE F-UP OR LMTD: CPT

## 2022-01-31 PROCEDURE — 80048 BASIC METABOLIC PNL TOTAL CA: CPT | Performed by: SURGERY

## 2022-01-31 DEVICE — 5.0MM TI LOCKING SCREW W/T25 STARDRIVE 42MM F/IM NAIL-STER: Type: IMPLANTABLE DEVICE | Site: HIP | Status: FUNCTIONAL

## 2022-01-31 DEVICE — 10MM/130 DEG TI CANN TFNA 170MM - STERILE
Type: IMPLANTABLE DEVICE | Site: HIP | Status: FUNCTIONAL
Brand: TFN-ADVANCE

## 2022-01-31 DEVICE — TFNA FENESTRATED HELICAL BLADE 110MM - STERILE
Type: IMPLANTABLE DEVICE | Site: HIP | Status: FUNCTIONAL
Brand: TFN-ADVANCE

## 2022-01-31 RX ORDER — SUCCINYLCHOLINE/SOD CL,ISO/PF 100 MG/5ML
SYRINGE (ML) INTRAVENOUS AS NEEDED
Status: DISCONTINUED | OUTPATIENT
Start: 2022-01-31 | End: 2022-01-31

## 2022-01-31 RX ORDER — ETOMIDATE 2 MG/ML
INJECTION INTRAVENOUS AS NEEDED
Status: DISCONTINUED | OUTPATIENT
Start: 2022-01-31 | End: 2022-01-31

## 2022-01-31 RX ORDER — BUPIVACAINE HYDROCHLORIDE 2.5 MG/ML
INJECTION, SOLUTION EPIDURAL; INFILTRATION; INTRACAUDAL AS NEEDED
Status: DISCONTINUED | OUTPATIENT
Start: 2022-01-31 | End: 2022-01-31

## 2022-01-31 RX ORDER — OXYCODONE HYDROCHLORIDE 5 MG/1
TABLET ORAL
Qty: 30 TABLET | Refills: 0 | Status: SHIPPED | OUTPATIENT
Start: 2022-01-31 | End: 2022-02-07 | Stop reason: SDUPTHER

## 2022-01-31 RX ORDER — SODIUM CHLORIDE, SODIUM LACTATE, POTASSIUM CHLORIDE, CALCIUM CHLORIDE 600; 310; 30; 20 MG/100ML; MG/100ML; MG/100ML; MG/100ML
INJECTION, SOLUTION INTRAVENOUS CONTINUOUS PRN
Status: DISCONTINUED | OUTPATIENT
Start: 2022-01-31 | End: 2022-01-31

## 2022-01-31 RX ORDER — DEXAMETHASONE SODIUM PHOSPHATE 10 MG/ML
INJECTION, SOLUTION INTRAMUSCULAR; INTRAVENOUS AS NEEDED
Status: DISCONTINUED | OUTPATIENT
Start: 2022-01-31 | End: 2022-01-31

## 2022-01-31 RX ORDER — HYDROMORPHONE HCL/PF 1 MG/ML
0.25 SYRINGE (ML) INJECTION
Status: DISCONTINUED | OUTPATIENT
Start: 2022-01-31 | End: 2022-01-31

## 2022-01-31 RX ORDER — ONDANSETRON 2 MG/ML
INJECTION INTRAMUSCULAR; INTRAVENOUS AS NEEDED
Status: DISCONTINUED | OUTPATIENT
Start: 2022-01-31 | End: 2022-01-31

## 2022-01-31 RX ORDER — DOCUSATE SODIUM 100 MG/1
100 CAPSULE, LIQUID FILLED ORAL 2 TIMES DAILY
Qty: 10 CAPSULE | Refills: 0 | Status: SHIPPED | OUTPATIENT
Start: 2022-01-31 | End: 2022-07-21 | Stop reason: ALTCHOICE

## 2022-01-31 RX ORDER — CEFAZOLIN SODIUM 2 G/50ML
2000 SOLUTION INTRAVENOUS EVERY 8 HOURS
Status: COMPLETED | OUTPATIENT
Start: 2022-01-31 | End: 2022-02-01

## 2022-01-31 RX ORDER — ALBUMIN, HUMAN INJ 5% 5 %
SOLUTION INTRAVENOUS CONTINUOUS PRN
Status: DISCONTINUED | OUTPATIENT
Start: 2022-01-31 | End: 2022-01-31

## 2022-01-31 RX ORDER — MAGNESIUM HYDROXIDE 1200 MG/15ML
LIQUID ORAL AS NEEDED
Status: DISCONTINUED | OUTPATIENT
Start: 2022-01-31 | End: 2022-01-31 | Stop reason: HOSPADM

## 2022-01-31 RX ORDER — FENTANYL CITRATE 50 UG/ML
INJECTION, SOLUTION INTRAMUSCULAR; INTRAVENOUS
Status: COMPLETED
Start: 2022-01-31 | End: 2022-01-31

## 2022-01-31 RX ORDER — ONDANSETRON 2 MG/ML
4 INJECTION INTRAMUSCULAR; INTRAVENOUS ONCE AS NEEDED
Status: DISCONTINUED | OUTPATIENT
Start: 2022-01-31 | End: 2022-01-31

## 2022-01-31 RX ORDER — PROPOFOL 10 MG/ML
INJECTION, EMULSION INTRAVENOUS AS NEEDED
Status: DISCONTINUED | OUTPATIENT
Start: 2022-01-31 | End: 2022-01-31

## 2022-01-31 RX ORDER — FENTANYL CITRATE/PF 50 MCG/ML
50 SYRINGE (ML) INJECTION
Status: DISCONTINUED | OUTPATIENT
Start: 2022-01-31 | End: 2022-01-31

## 2022-01-31 RX ORDER — ROCURONIUM BROMIDE 10 MG/ML
INJECTION, SOLUTION INTRAVENOUS AS NEEDED
Status: DISCONTINUED | OUTPATIENT
Start: 2022-01-31 | End: 2022-01-31

## 2022-01-31 RX ORDER — SENNOSIDES 8.6 MG
650 CAPSULE ORAL EVERY 8 HOURS PRN
Qty: 30 TABLET | Refills: 0 | Status: SHIPPED | OUTPATIENT
Start: 2022-01-31 | End: 2022-02-19

## 2022-01-31 RX ORDER — CEFAZOLIN SODIUM 1 G/3ML
INJECTION, POWDER, FOR SOLUTION INTRAMUSCULAR; INTRAVENOUS AS NEEDED
Status: DISCONTINUED | OUTPATIENT
Start: 2022-01-31 | End: 2022-01-31

## 2022-01-31 RX ORDER — METOCLOPRAMIDE HYDROCHLORIDE 5 MG/ML
5 INJECTION INTRAMUSCULAR; INTRAVENOUS ONCE AS NEEDED
Status: DISCONTINUED | OUTPATIENT
Start: 2022-01-31 | End: 2022-01-31

## 2022-01-31 RX ORDER — SODIUM CHLORIDE, SODIUM GLUCONATE, SODIUM ACETATE, POTASSIUM CHLORIDE, MAGNESIUM CHLORIDE, SODIUM PHOSPHATE, DIBASIC, AND POTASSIUM PHOSPHATE .53; .5; .37; .037; .03; .012; .00082 G/100ML; G/100ML; G/100ML; G/100ML; G/100ML; G/100ML; G/100ML
50 INJECTION, SOLUTION INTRAVENOUS CONTINUOUS
Status: DISCONTINUED | OUTPATIENT
Start: 2022-01-31 | End: 2022-02-01

## 2022-01-31 RX ORDER — HEPARIN SODIUM 5000 [USP'U]/ML
5000 INJECTION, SOLUTION INTRAVENOUS; SUBCUTANEOUS EVERY 8 HOURS SCHEDULED
Status: DISCONTINUED | OUTPATIENT
Start: 2022-01-31 | End: 2022-01-31

## 2022-01-31 RX ORDER — FENTANYL CITRATE 50 UG/ML
INJECTION, SOLUTION INTRAMUSCULAR; INTRAVENOUS AS NEEDED
Status: DISCONTINUED | OUTPATIENT
Start: 2022-01-31 | End: 2022-01-31

## 2022-01-31 RX ORDER — LIDOCAINE HYDROCHLORIDE 10 MG/ML
INJECTION, SOLUTION EPIDURAL; INFILTRATION; INTRACAUDAL; PERINEURAL AS NEEDED
Status: DISCONTINUED | OUTPATIENT
Start: 2022-01-31 | End: 2022-01-31

## 2022-01-31 RX ADMIN — FENTANYL CITRATE 50 MCG: 50 INJECTION INTRAMUSCULAR; INTRAVENOUS at 14:55

## 2022-01-31 RX ADMIN — FINASTERIDE 5 MG: 5 TABLET, FILM COATED ORAL at 09:11

## 2022-01-31 RX ADMIN — FENTANYL CITRATE 25 MCG: 50 INJECTION, SOLUTION INTRAMUSCULAR; INTRAVENOUS at 13:20

## 2022-01-31 RX ADMIN — CEFAZOLIN SODIUM 2000 MG: 2 SOLUTION INTRAVENOUS at 16:49

## 2022-01-31 RX ADMIN — Medication 500 MG: at 17:00

## 2022-01-31 RX ADMIN — FENTANYL CITRATE 25 MCG: 50 INJECTION, SOLUTION INTRAMUSCULAR; INTRAVENOUS at 12:55

## 2022-01-31 RX ADMIN — BUPIVACAINE HYDROCHLORIDE 10 ML: 2.5 INJECTION, SOLUTION EPIDURAL; INFILTRATION; INTRACAUDAL at 11:45

## 2022-01-31 RX ADMIN — HEPARIN SODIUM 5000 UNITS: 5000 INJECTION INTRAVENOUS; SUBCUTANEOUS at 21:22

## 2022-01-31 RX ADMIN — SENNOSIDES AND DOCUSATE SODIUM 1 TABLET: 50; 8.6 TABLET ORAL at 17:00

## 2022-01-31 RX ADMIN — SODIUM CHLORIDE, SODIUM LACTATE, POTASSIUM CHLORIDE, AND CALCIUM CHLORIDE: .6; .31; .03; .02 INJECTION, SOLUTION INTRAVENOUS at 12:30

## 2022-01-31 RX ADMIN — DULOXETINE HYDROCHLORIDE 60 MG: 60 CAPSULE, DELAYED RELEASE ORAL at 09:10

## 2022-01-31 RX ADMIN — CEFAZOLIN SODIUM 2000 MG: 1 INJECTION, POWDER, FOR SOLUTION INTRAMUSCULAR; INTRAVENOUS at 12:47

## 2022-01-31 RX ADMIN — ETOMIDATE 10 MG: 2 INJECTION, SOLUTION INTRAVENOUS at 12:41

## 2022-01-31 RX ADMIN — LEVALBUTEROL HYDROCHLORIDE 1.25 MG: 1.25 SOLUTION, CONCENTRATE RESPIRATORY (INHALATION) at 07:21

## 2022-01-31 RX ADMIN — OXYCODONE HYDROCHLORIDE AND ACETAMINOPHEN 500 MG: 500 TABLET ORAL at 09:11

## 2022-01-31 RX ADMIN — SUGAMMADEX 200 MG: 100 INJECTION, SOLUTION INTRAVENOUS at 13:38

## 2022-01-31 RX ADMIN — LEVALBUTEROL HYDROCHLORIDE 1.25 MG: 1.25 SOLUTION, CONCENTRATE RESPIRATORY (INHALATION) at 20:37

## 2022-01-31 RX ADMIN — ISODIUM CHLORIDE 3 ML: 0.03 SOLUTION RESPIRATORY (INHALATION) at 20:37

## 2022-01-31 RX ADMIN — PROPOFOL 50 MG: 10 INJECTION, EMULSION INTRAVENOUS at 12:41

## 2022-01-31 RX ADMIN — ISODIUM CHLORIDE 3 ML: 0.03 SOLUTION RESPIRATORY (INHALATION) at 07:21

## 2022-01-31 RX ADMIN — FENTANYL CITRATE 25 MCG: 50 INJECTION, SOLUTION INTRAMUSCULAR; INTRAVENOUS at 13:37

## 2022-01-31 RX ADMIN — FENTANYL CITRATE 25 MCG: 50 INJECTION, SOLUTION INTRAMUSCULAR; INTRAVENOUS at 13:42

## 2022-01-31 RX ADMIN — PROPOFOL 50 MG: 10 INJECTION, EMULSION INTRAVENOUS at 13:40

## 2022-01-31 RX ADMIN — ALBUMIN HUMAN: 0.05 INJECTION, SOLUTION INTRAVENOUS at 12:50

## 2022-01-31 RX ADMIN — PRAVASTATIN SODIUM 40 MG: 40 TABLET ORAL at 16:49

## 2022-01-31 RX ADMIN — ROCURONIUM BROMIDE 30 MG: 10 SOLUTION INTRAVENOUS at 12:45

## 2022-01-31 RX ADMIN — FENTANYL CITRATE 50 MCG: 50 INJECTION, SOLUTION INTRAMUSCULAR; INTRAVENOUS at 11:40

## 2022-01-31 RX ADMIN — FLUTICASONE FUROATE AND VILANTEROL TRIFENATATE 1 PUFF: 200; 25 POWDER RESPIRATORY (INHALATION) at 09:12

## 2022-01-31 RX ADMIN — Medication 1000 UNITS: at 09:10

## 2022-01-31 RX ADMIN — SODIUM CHLORIDE, SODIUM GLUCONATE, SODIUM ACETATE, POTASSIUM CHLORIDE, MAGNESIUM CHLORIDE, SODIUM PHOSPHATE, DIBASIC, AND POTASSIUM PHOSPHATE 50 ML/HR: .53; .5; .37; .037; .03; .012; .00082 INJECTION, SOLUTION INTRAVENOUS at 16:07

## 2022-01-31 RX ADMIN — FENTANYL CITRATE 50 MCG: 50 INJECTION, SOLUTION INTRAMUSCULAR; INTRAVENOUS at 12:41

## 2022-01-31 RX ADMIN — Medication 2 TABLET: at 21:21

## 2022-01-31 RX ADMIN — BUPIVACAINE 20 ML: 13.3 INJECTION, SUSPENSION, LIPOSOMAL INFILTRATION at 11:45

## 2022-01-31 RX ADMIN — ACETAMINOPHEN 975 MG: 325 TABLET, FILM COATED ORAL at 21:22

## 2022-01-31 RX ADMIN — Medication 500 MG: at 09:10

## 2022-01-31 RX ADMIN — SENNOSIDES AND DOCUSATE SODIUM 1 TABLET: 50; 8.6 TABLET ORAL at 09:11

## 2022-01-31 RX ADMIN — GABAPENTIN 300 MG: 300 CAPSULE ORAL at 17:00

## 2022-01-31 RX ADMIN — LIDOCAINE HYDROCHLORIDE 30 MG: 10 INJECTION, SOLUTION EPIDURAL; INFILTRATION; INTRACAUDAL; PERINEURAL at 12:41

## 2022-01-31 RX ADMIN — ALLOPURINOL 200 MG: 100 TABLET ORAL at 09:10

## 2022-01-31 RX ADMIN — Medication 100 MCG: at 09:10

## 2022-01-31 RX ADMIN — Medication 120 MG: at 12:41

## 2022-01-31 RX ADMIN — ONDANSETRON 4 MG: 2 INJECTION INTRAMUSCULAR; INTRAVENOUS at 13:29

## 2022-01-31 RX ADMIN — DEXAMETHASONE SODIUM PHOSPHATE 5 MG: 10 INJECTION, SOLUTION INTRAMUSCULAR; INTRAVENOUS at 13:00

## 2022-01-31 NOTE — PHYSICAL THERAPY NOTE
PHYSICAL THERAPY CANCELLATION NOTE    Patient Name: Cyndee Meigs  CWIRB'K Date: 1/31/2022 01/31/22 4017   PT Last Visit   PT Visit Date 01/31/22   Note Type   Note type Cancelled Session   Cancel Reasons Patient to operating room   Additional Comments PT orders received, chart review performed  Pt now tentative for OR today for surgical intervention of R femur fracture, will see pt post-op  Please place new PT eval order and appropriate WBS, activity order post-op, thank you!      Hema Agarwal, PT, DPT

## 2022-01-31 NOTE — TELEPHONE ENCOUNTER
Pt's wife is calling to cancel his appointment since he is in the hospital     Name: Alena Enriquez MRN: [id number]   Date: 1/31/2022 Status: [facility]   Time: 8:45 AM Length: 15   Visit Type: VISCO INJECTION PG [[id number]] Copay: $0 00   Provider: Daniel Stone PA-C Department: [address] [place], [place] [address]

## 2022-01-31 NOTE — ASSESSMENT & PLAN NOTE
- Acute right intertrochanteric femur fracture, present on admission   - Appreciate Orthopedic surgery evaluation and recommendations  - Plan for OR today 1/31 for right femur IM nail  - Patient's respiratory status has improved with multiple interventions and cardiology, nephrology have evaluated the patient  - Cardiology consultation indicates patient is intermediate to high perioperative risk, however it is a necessary surgery in order for the patient to ambulate  - Continue to hold home Xarelto  Evaluate post-operatively if patient can resume if Hgb is stable  - Maintain non-weightbearing status on the right lower extremity   - Monitor right lower extremity neurovascular exam   - Continue multimodal analgesic regimen  - Initiate chemical DVT prophylaxis  - PT and OT evaluation and treatment as indicated

## 2022-01-31 NOTE — CONSULTS
Consult Note- Acute Pain Service   Kalee Rubinman 80 y o  male MRN: [id number]  Unit/Bed#: W -01 Encounter: [id number]               Assessment/Plan     Assessment:   Patient Active Problem List   Diagnosis    Chronic pain syndrome    Bilateral lumbar radiculopathy    Lumbar canal stenosis    JOO (generalized anxiety disorder)    Benign hypertension with chronic kidney disease, stage III ([facility]    Acute kidney injury superimposed on chronic kidney disease ([facility]    Benign prostatic hyperplasia    Chronic diastolic CHF (congestive heart failure) ([facility]    Allergic rhinitis    Aneurysm of abdominal aorta ([facility]    Chronic a-fib ([facility]    Carpal tunnel syndrome    Chronic gout without tophus    Centrilobular emphysema ([facility])    Deep venous insufficiency    Difficulty in walking    Fecal soiling    Fibromyalgia    Fistula-in-ano    Hyperlipidemia    Moderate or severe vision impairment, one eye    Class 2 obesity due to excess calories without serious comorbidity with body mass index (BMI) of 36 0 to 36 9 in adult    Pacemaker    JOVI (obstructive sleep apnea)    Urinary incontinence    Venous insufficiency (chronic) (peripheral)    Peripheral arteriosclerosis (HCC)    Lumbar radiculopathy    Serum total bilirubin elevated    Stage 3a chronic kidney disease ([address] )    CAD (coronary artery disease)    Status post primary angioplasty with coronary stent    Mixed simple and mucopurulent chronic bronchitis ([facility]    Hyperuricemia    Bilateral leg edema    Nephrolithiasis    Former smoker    Anemia    Vitamin D insufficiency    Chronic constipation    Medicare annual wellness visit, subsequent    Idiopathic hematuria    Kidney stone on left side    Flank pain    Obesity (BMI 30-39  9)    Dyspnea on exertion    Neuropathy    Coronary artery arteriosclerosis    Essential hypertension    Memory difficulty    COPD (chronic obstructive pulmonary disease) ([facility]    Acute on chronic diastolic heart failure (HCC)    Skin tear of right upper arm without complication    Contusion, buttock, initial encounter    Wheezing    Fall    Closed fracture of right femur (HCC)      Archana Cisneros is a 80 y o  male  With pmh s/of chronic pain syndrome, CAD s/p stents, mod/severe aortic stenosis, chronic a-fib, COPD, pacemaker, sleep apnea on CPAP, smoking history who presented 1/29 after slip and fall on ice with right femur fx  Surgical pinning originally scheduled 1/29, however pt had oxygen levels on admission in the mid 80s despite 6L O2  Pt was seen by cardiology for risk stratification and his respiratory status was optimized on 1/29 and surgery was moved to 1/30  Acute pain service consulted for post op pain management due to history of chronic pain  Pt seen this AM, surgery currently scheduled today around 1pm  Patient undergoing TTE at time of evaluation  Pt drowsy, most discussion was with patient's daughter who is at bedisde  Patient denies pain at this time, only complains of dry mouth and thirst  Has only received tylenol so far for pain  Respiratory status appears improved  Patient is on 2L NC and O2 sat are in the >95%  Plan:   - Given his pending surgery early this afternoon (rescheduled from yesterday) would opt to continue tylenol only for pain at this time  - Limit opioids if possible, due to risk of respiratory compromise from respiratory comorbidities (initial presentation with low O2 saturations despite supplemental O2)  - Could consider reynaldo-op/post-op nerve block (PENG or fascia iliaca) and/or ketamine infusion if appropriate  - Acute pain service will continue to follow and evaluate in anticipation of increased pain post-op    APS will continue to follow  Please contact Acute Pain Service - [facility] via [employer] from [phone] with additional questions or concerns  See [employer] or Carlos for additional contacts and after hours information      History of Present Illness Admit Date:  1/29/2022  Hospital Day:  2 days  Primary Service:  Surgery-General  Attending Provider:  Aziza Cobian MD  Reason for Consult / Principal Problem: right femur fx, chronic pain  HPI: Nia Andersen is a 80 y o  male (see HPI above)    Current pain location(s): right hip  Pain Scale:   0-6/10  Quality: sharp  Current Analgesic regimen:  tylenol    Pain History: h/o chronic pain syndrome      Consults    Review of Systems   Constitutional:        Drowsy   HENT:        Dry mouth   Eyes: Negative  Respiratory: Positive for shortness of breath  Cardiovascular: Negative  Gastrointestinal: Negative  Endocrine: Negative  Genitourinary: Negative  Musculoskeletal: Negative  Skin: Negative  Allergic/Immunologic: Negative  Neurological: Negative  Hematological: Negative      Psychiatric/Behavioral:        Drowsy       Historical Information   Past Medical History:   Diagnosis Date    Arthritis     [facility] fl[facility] [address] [facility]     Chronic kidney disease     stage 3    Coronary artery disease     2 stents    Fluid retention     Gout     Heart failure (HCC)     pacemaker    Hypertension     Pacemaker     Pulmonary emphysema ([facility] [address] [facility]     Radiculopathy     last assessed 1/28/16     Shortness of breath     exertion    Sleep apnea     c pap     Past Surgical History:   Procedure Laterality Date    ANGIOPLASTY      x2 2 stents and then replaced    CARDIAC PACEMAKER PLACEMENT      pacemaker permanent placement dual chamber / last assessed 4/7/14 / implantation     CARDIAC SURGERY      pacemaker    CHOLECYSTECTOMY      CORONARY ANGIOPLASTY WITH STENT PLACEMENT      EPIDURAL BLOCK INJECTION N/A 5/26/2016    Procedure: BLOCK / INJECTION EPIDURAL STEROID LUMBAR  L4-5;  Surgeon: Farhad Martinez MD;  Location: [place] [address] MAIN OR;  Service:     EPIDURAL BLOCK INJECTION N/A 2/14/2019    Procedure: L4 L5 Lumbar Epidural Steroid Injection;  Surgeon: Farhad Martinez MD;  Location: [address];  Service: Pain Management     EYE SURGERY      cataract left    KNEE ARTHROSCOPY W/ MENISCAL REPAIR Left     LUMBAR EPIDURAL INJECTION N/A 3/17/2016    Procedure: BLOCK / INJECTION LUMBAR  L4-5  (C-ARM); Surgeon: Harleen Camarillo MD;  Location: [facility] MAIN OR;  Service:      Social History   Social History     Substance and Sexual Activity   Alcohol Use Never     Social History     Substance and Sexual Activity   Drug Use Never     Social History     Tobacco Use   Smoking Status Former Smoker    Packs/day: 1 00    Years: 50 00    Pack years: 50 00    Types: Cigarettes    Quit date: 3/27/2019    Years since quittin 8   Smokeless Tobacco Never Used   Tobacco Comment    quit 2021     Family History: non-contributory    Meds/Allergies   all current active meds have been reviewed    No Known Allergies    Objective   Temp:  [98 7 °F (37 1 °C)-99 2 °F (37 3 °C)] 99 °F (37 2 °C)  HR:  [70-78] 76  Resp:  [16-20] 19  BP: ()/(45-58) 106/52    Intake/Output Summary (Last 24 hours) at 2022 1048  Last data filed at 2022 0617  Gross per 24 hour   Intake 0 ml   Output 2050 ml   Net -2050 ml       Physical Exam  Constitutional:       Appearance: He is obese  HENT:      Mouth/Throat:      Mouth: Mucous membranes are dry  Eyes:      Extraocular Movements: Extraocular movements intact  Cardiovascular:      Rate and Rhythm: Normal rate  Pulmonary:      Effort: Respiratory distress present  Abdominal:      Palpations: Abdomen is soft  Musculoskeletal:         General: Signs of injury present  No deformity  Cervical back: Normal range of motion  Skin:     General: Skin is warm and dry  Neurological:      General: No focal deficit present  Psychiatric:         Mood and Affect: Mood normal          Thought Content: Thought content normal          Please note that the APS provides consultative services regarding pain management only    With the exception of ketamine and epidural infusions and except when indicated, final decisions regarding starting or changing doses of analgesic medications are at the discretion of the consulting service  Off hours consultation and/or medication management is generally not available      Pennie Guido MD  Acute Pain Service

## 2022-01-31 NOTE — OP NOTE
PERATIVE REPORT  PATIENT NAME: Jean Cantu    :  1940  MRN: [id number]  Pt Location: AN OR ROOM 01    SURGERY DATE: 2022    Surgeon(s) and Role:     * More Rodriguez MD - Primary     * Irasema Garzon PA-C - Assisting     * Emile Villalobos MD - Assisting    Preop Diagnosis:  * No pre-op diagnosis entered *    * No Diagnosis Codes entered *    Procedure(s) (LRB):  INSERTION NAIL IM FEMUR ANTEGRADE (TROCHANTERIC) (Right)    Specimen(s):  * No specimens in log *    Estimated Blood Loss:   100 mL    Drains:  Urethral Catheter 16 Fr  (Active)   Reasons to continue Urinary Catheter  Accurate I&O assessment in critically ill patients (48 hr  max) 22   Goal for Removal Remove after 48 hrs of I/O monitoring 22   Site Assessment Clean;Skin intact 22   [place] Care Done 22 0900   Collection Container Standard drainage bag 22   Securement Method Securing device (Describe) 22   Output (mL) 450 mL 22 0617   Number of days: 1       Anesthesia Type:   General    Operative Indications:  * No pre-op diagnosis entered *    Patient is an 28-year-old male who slipped and fell while snow blowing 2 days ago  He sustained a right intertrochanteric femur fracture  Discussion was had regarding operative and non operative treatment for this injury  In order to restore stability to the right hip, allow for early functional rehabilitation, and avoid complications of prolonged bedrest, patient was indicated for operative fixation of the right hip  Risks and benefits of operative management were explained to the patient in full  Patient expressed understanding of these risks and benefits and wished to proceed with surgery  Operative Findings:  See complete op note below    Complications:   None    Procedure and Technique:  The patient was identified in the pre-operative holding area, and the surgical site was marked by the surgeon    The patient was transported to the operating room  General anesthesia was administered and the patient was then placed in the supine position on the fracture table  A well-padded perineal post was placed  The feet were padded and secured to the fracture table  Traction was applied to the operative extremity to reduce the fracture  The right hip was prepped and draped in the usual sterile fashion  Prophylactic antibiotics were given within 1 hour of incision  Following a surgical timeout, a 4 cm lateral incision was made proximal to the greater trochanter  Deep dissection was carried out sharply through the fascia chaparrita with Stephens scissors  The guide wire for the Synthes Trochanteric Fixation Nail (TFN) System was advanced into the femur from a trochanteric starting point  The 16 0 mm cannulated drill bit was passed over the guide wire with the soft tissue protector in place  The short TFN was inserted manually following removal of the guide wire  Positioning of the intramedullary nail was confirmed fluoroscopically  The guide wire for the helical blade was then advanced into the femoral head through a small lateral incision using the external guide  Fluoroscopic imaging confirmed satisfactory pin placement within the femoral head  A depth measurement was obtained, and the outer cortex was drilled  The helical blade was inserted, and the preassembled locking mechanism was tightened within the proximal nail  The helical blade  and guide wire were removed  A distal locking screw was inserted through a small lateral thigh incision using the external guide  Final fluoroscopic images were obtained in multiple planes to confirm satisfactory fracture reduction and hardware position  The wounds were then irrigated with sterile saline solution  The fascia chaparrita was closed with 0-Vicryl suture in an interrupted figure-of-eight fashion    The subcuticular layer was closed with a simple buried 2-0 Vicryl suture, and the skin was closed with 3-0 Monocryl and Exofin skin glue  The wounds were dressed with sterile 4 x 4 gauze and Tegaderm  Anesthesia was discontinued  The patient was transferred to the stretcher and transported to the recovery area in stable condition  Dr Chloe Asher was present for the entirety of the case  Postoperative protocol will include weight-bearing as tolerated to the right lower extremity, recommendations for 28 days of chemoprophylaxis for DVTs, pain management with oxycodone, and a clinic follow-up in 2 weeks for repeat evaluation and radiographs      SIGNATURE: Leticia Mayorga MD  DATE: January 31, 2022  TIME: 1:58 PM

## 2022-01-31 NOTE — RESPIRATORY THERAPY NOTE
01/30/22 1959   Respiratory Assessment   Resp Comments Placed patient on 7L [address] due to low spo2  Patient's daughter also stated patient wears cpap +14 HS and BID nebs, will follow home regimen

## 2022-01-31 NOTE — ASSESSMENT & PLAN NOTE
- Chronic history of atrial fibrillation   - Patient normally anticoagulated with Xarelto  Will hold anticoagulant therapy in anticipation of orthopedic operative intervention   - Plan to resume anticoagulation postoperatively  - Monitor for adequate heart rate control   - Continue home medication regimen as appropriate otherwise    - Appreciate Cardiology evaluation and recommendations   - Outpatient follow-up per team

## 2022-01-31 NOTE — CONSULTS
Consultation - Geriatrics   Nia Andersen 80 y o  male MRN: [id number]  Unit/Bed#: W -01 Encounter: [id number]      Assessment/Plan  1  Ambulatory dysfunction s/p fall  · S/p fall on ice, however, recently fell in back yard, [facility] pcp contributed to gabapentin  · Now s/p right hip nail IM femur antegrade  · PT OT eval pending - STR vs HHC   · Fall precautions  2  Deconditioning/frailty  Pt at risk for deconditioning d/t fall, hospitalization    Daughter reports new "bed sore" since hospital  Optimize diet, hydration, mobility for healing  GFR 27 - keep hydrated, avoid nephrotoxins, renally dose medications  Monitor I&O, supplements as needed  Monitor ss infection, dehydration, dvt, skin breakdown  Encourage cough and deep breathing exercises, IS   3   Forgetfulness  No concerns per pt/daughter  No previous delirium, confusions, hallucinations   CTH:  No recent CTH for review in epic   Gerilabs:  TSH wnl, consider b12/folate check if needed  Recommend OT check MOCa when medically clear and f/u with outpt geriatrics if concerns  Engage in regular social, physical, cognitive activity  Optimize acute and chronic conditions  Frequent reminders and close monitoring for safety  4  Delirium risk  At risk for delirium d/t recent surgery/anethesia, hospitalization, recent fall  [place] delirium precautions  First line treatment is reorient, redirect, reassure  Include family as able  Avoid restraints and sedating medications  Identify and treat reversible causes of confusion including infection, dehydration, electrolyte imbalance, anemia, hypoxia, urinary retention, constipation, pain, sleep disturbance  Avoid deliriogenic medications such as tramadol, high dose narcotics, benzodiazepines, anticholinergics  Engage in regular activity  Monitor sleep wake cycle  Monitor depression  Ensure adequate hydration and nutrition  Mobilize early and often according to poc   qtc 544ms - avoid antipsychotics  5  Acute pain d/t trauma  Recommend geriatrics pain protocol  Schedule tylenol, lido patch, prn oxyIR 2 5/5mg  Takes gabapentin bid, consider GDR as pcp felt pt last fall was related to gabapentin  Monitor constipation  Cont nonpharm methods of pain control  6  Vision/hearing impairment  · Hearing aide not present  · Supportive environment:  Adequate lighting, speak clearly and toward patient when communicating  Avoid outside noise distractions  Assist as needed  7  Constipation  · Reports mild constipation pta  · Cont senna, prn miralax  · Encourage dietary fiber, fluids, mobility as able  9  Bone health  · Fall resulting in femur fx  · Cont encouraging dietary calcium/d3, supplement as needed  · No recent dxa scan  · F/u with pcp for osteoporotic eval and treatment plan  10  Home medication review   Mail order, unable to eval   Daughter unsure of list    History of Present Illness   Physician Requesting Consult: Mode Camacho MD  Reason for Consult / Principal Problem: fall  Hx and PE limited by: n/a  HPI: Jean Cantu is a 80y o  year old male who presents with right femur fracture after fall on ice, no LOC  OR repair planned for today  Comorbidities include htn, OA, chf, hans  Patient lives a at home with wife and daughters  Pt is independent with adl/iadls  No assistive device for ambulation  One recent fall, pcp felt d/t gabapentin - decreasing dose as outpt  Pt wears [place], hearing aides not present  No vision or dental issues  Pt with short term memory loss, no confusions/hallucinations, no concern for cognition/function from family  No c/o anxiety/depression, no insomnia  Does have c/o constipation  Does have chronic lower extremity and back pain  Inpatient consult to Gerontology  Consult performed by: NAVA Jewell  Consult ordered by: Emile Villalobos MD          Review of Systems   Constitutional: Negative for activity change, appetite change, chills and fatigue  HENT: Positive for hearing loss (hearing aides not here)  Negative for congestion and trouble swallowing  Eyes: Negative for visual disturbance  Respiratory: Negative for cough and shortness of breath  Cardiovascular: Negative for chest pain  Gastrointestinal: Positive for constipation  Negative for abdominal pain, diarrhea, nausea and vomiting  Genitourinary: Negative for difficulty urinating  Musculoskeletal: Positive for arthralgias (knees), back pain and gait problem  Chronic neuropathy   Neurological: Negative for dizziness and light-headedness  Psychiatric/Behavioral: Negative for decreased concentration, dysphoric mood, hallucinations and sleep disturbance  The patient is not nervous/anxious          Historical Information   Past Medical History:   Diagnosis Date    Arthritis     Atrial flutter ([facility] [address] )     Chronic kidney disease     stage 3    Coronary artery disease     2 stents    Fluid retention     Gout     Heart failure (HCC)     pacemaker    Hypertension     Pacemaker     [facility]     Radiculopathy     last assessed 1/28/16     Shortness of breath     exertion    Sleep apnea     c pap     Past Surgical History:   Procedure Laterality Date    ANGIOPLASTY      x2 2 stents and then replaced    CARDIAC PACEMAKER PLACEMENT      pacemaker permanent placement dual chamber / last assessed 4/7/14 / implantation     CARDIAC SURGERY      pacemaker    CHOLECYSTECTOMY      CORONARY ANGIOPLASTY WITH STENT PLACEMENT      EPIDURAL BLOCK INJECTION N/A 5/26/2016    Procedure: BLOCK / INJECTION EPIDURAL STEROID LUMBAR  L4-5;  Surgeon: Catherine Morales MD;  Location[facility] MAIN OR;  Service:     EPIDURAL BLOCK INJECTION N/A 2/14/2019    Procedure: L4 L5 Lumbar Epidural Steroid Injection;  Surgeon: Catherine Morales MD;  Location: [facility] MAIN OR;  Service: Pain Management     EYE SURGERY      cataract left    KNEE ARTHROSCOPY W/ MENISCAL REPAIR Left     LUMBAR EPIDURAL INJECTION N/A 3/17/2016    Procedure: BLOCK / INJECTION LUMBAR  L4-5  (C-ARM);   Surgeon: Fabienne Mendoza MD;  Location[facility] OR;  Service:      Social History   Social History     Substance and Sexual Activity   Alcohol Use Never     Social History     Substance and Sexual Activity   Drug Use Never     Social History     Tobacco Use   Smoking Status Former Smoker    Packs/day: 1 00    Years: 50 00    Pack years: 50 00    Types: Cigarettes    Quit date: 3/27/2019    Years since quittin 8   Smokeless Tobacco Never Used   Tobacco Comment    quit 2021         Family History:   Family History   Problem Relation Age of Onset    Cancer Mother 80    Heart disease Mother     Hypertension Mother     Heart disease Father     Diabetes Neg Hx     Stroke Neg Hx        Meds/Allergies   Current meds:   Current Facility-Administered Medications   Medication Dose Route Frequency    acetaminophen (TYLENOL) tablet 975 mg  975 mg Oral Q8H [facility]    albuterol (PROVENTIL HFA,VENTOLIN HFA) inhaler 2 puff  2 puff Inhalation Q6H PRN    allopurinol (ZYLOPRIM) tablet 200 mg  200 mg Oral Daily    ascorbic acid (VITAMIN C) tablet 500 mg  500 mg Oral Daily    calcium carbonate-vitamin D (OSCAL-D) 500 mg-200 units per tablet 2 tablet  2 tablet Oral HS    ceFAZolin (ANCEF) IVPB (premix in dextrose) 2,000 mg 50 mL  2,000 mg Intravenous Q8H    cholecalciferol (VITAMIN D3) tablet 1,000 Units  1,000 Units Oral Daily    cyanocobalamin (VITAMIN B-12) tablet 100 mcg  100 mcg Oral Daily    DULoxetine (CYMBALTA) delayed release capsule 60 mg  60 mg Oral Daily    felodipine (PLENDIL) 24 hr tablet 5 mg  5 mg Oral Daily    finasteride (PROSCAR) tablet 5 mg  5 mg Oral Daily    fluticasone-vilanterol (BREO ELLIPTA) 200-25 MCG/INH inhaler 1 puff  1 puff Inhalation Daily    gabapentin (NEURONTIN) capsule 300 mg  300 mg Oral BID    glucosamine sulfate capsule 500 mg  500 mg Oral BID    heparin (porcine) subcutaneous injection 5,000 Units  5,000 Units Subcutaneous Q8H [address]    HYDROmorphone (DILAUDID) injection 0 2 mg  0 2 mg Intravenous Q4H PRN    levalbuterol (XOPENEX) inhalation solution 0 63 mg  0 63 mg Nebulization Q8H PRN    levalbuterol (XOPENEX) inhalation solution 1 25 mg  1 25 mg Nebulization BID    multi-electrolyte (PLASMALYTE-A/ISOLYTE-S PH 7 4) IV solution  50 mL/hr Intravenous Continuous    naloxone (NARCAN) 0 04 mg/mL syringe 0 04 mg  0 04 mg Intravenous Q1MIN PRN    oxyCODONE (ROXICODONE) IR tablet 2 5 mg  2 5 mg Oral Q4H PRN    oxyCODONE (ROXICODONE) IR tablet 5 mg  5 mg Oral Q4H PRN    polyethylene glycol (MIRALAX) packet 17 g  17 g Oral Daily PRN    pravastatin (PRAVACHOL) tablet 40 mg  40 mg Oral Daily With Dinner    senna-docusate sodium (SENOKOT S) 8 6-50 mg per tablet 1 tablet  1 tablet Oral BID    sodium chloride 0 9 % inhalation solution 3 mL  3 mL Nebulization BID      Current PTA meds:  Medications Prior to Admission   Medication    albuterol (Ventolin HFA) 90 mcg/act inhaler    allopurinol (ZYLOPRIM) 100 mg tablet    ascorbic acid (VITAMIN C) 500 mg tablet    B Complex Vitamins (B COMPLEX 1 PO)    Biotin (BIOTIN 5000) 5 MG CAPS    calcium carbonate-vitamin D (OSCAL-D) 500 mg-200 units per tablet    cholecalciferol (VITAMIN D3) 1,000 units tablet    clopidogrel (PLAVIX) 75 mg tablet    colchicine (COLCRYS) 0 6 mg tablet    Cranberry 1000 MG CAPS    cyanocobalamin 1000 MCG tablet    dextromethorphan-guaiFENesin (ROBITUSSIN DM)  mg/5 mL syrup    docusate sodium (COLACE) 100 mg capsule    DULoxetine (CYMBALTA) 60 mg delayed release capsule    felodipine (PLENDIL) 5 mg 24 hr tablet    finasteride (PROSCAR) 5 mg tablet    Flaxseed, Linseed, (EQL FLAX SEED OIL) 1000 MG CAPS    fluocinonide (LIDEX) 0 05 % cream    furosemide (LASIX) 40 mg tablet    gabapentin (Neurontin) 300 mg capsule    glucosamine-chondroitin 500-400 MG tablet    rivaroxaban (XARELTO) 15 mg tablet    simvastatin (ZOCOR) 20 mg tablet    spironolactone (ALDACTONE) 25 mg tablet    Farxiga 5 MG TABS    fluticasone-umeclidinium-vilanterol (Trelegy Ellipta) 200-62 5-25 MCG/INH AEPB inhaler        No Known Allergies    Objective   Vitals: Blood pressure 152/69, pulse 78, temperature 98 °F (36 7 °C), resp  rate 22, height 6' 1" (1 854 m), weight 101 kg (223 lb), SpO2 93 %  ,Body mass index is 29 42 kg/m²  Physical Exam  Vitals and nursing note reviewed  Constitutional:       General: He is not in acute distress  Appearance: Normal appearance  He is well-developed  He is not diaphoretic  HENT:      Head: Normocephalic  Cardiovascular:      Rate and Rhythm: Normal rate  Heart sounds: No murmur heard  No friction rub  No gallop  Pulmonary:      Effort: Pulmonary effort is normal  No respiratory distress  Breath sounds: Normal breath sounds  No wheezing or rales  Abdominal:      General: Bowel sounds are normal  There is no distension  Palpations: Abdomen is soft  Tenderness: There is no abdominal tenderness  Musculoskeletal:         General: Normal range of motion  Cervical back: Normal range of motion  Skin:     General: Skin is warm and dry  Comments: Unable to visualize dressings   Neurological:      General: No focal deficit present  Mental Status: He is alert and oriented to person, place, and time  Mental status is at baseline  Comments: Sleepy  Answers questions appropriately but falls easily back to sleep      Psychiatric:         Mood and Affect: Mood normal          Behavior: Behavior normal          Lab Results:   Results from last 7 days   Lab Units 01/31/22  0510   WBC Thousand/uL 11 24*   HEMOGLOBIN g/dL 8 1*   HEMATOCRIT % 27 1*   PLATELETS Thousands/uL 226        Results from last 7 days   Lab Units 01/31/22  0510   POTASSIUM mmol/L 3 9   CHLORIDE mmol/L 101   CO2 mmol/L 26   BUN mg/dL 49*   CREATININE mg/dL 2 16*   CALCIUM mg/dL 8 8 Imaging Studies: I have personally reviewed pertinent reports  EKG, Pathology, and Other Studies: I have personally reviewed pertinent reports      VTE Prophylaxis: Heparin    Code Status: Level 1 - Full Code

## 2022-01-31 NOTE — ASSESSMENT & PLAN NOTE
Lab Results   Component Value Date    EGFR 27 01/31/2022    EGFR 32 01/30/2022    EGFR 36 01/29/2022    CREATININE 2 16 (H) 01/31/2022    CREATININE 1 89 (H) 01/30/2022    CREATININE 1 70 (H) 01/29/2022     - Patient with chronic history of CKD stage 3 with baseline creatinine appearing to be between 1 4 and 1 5   - Cr elevated on admission and has been worsening due to use of diuretics in the setting of pulmonary vascular congestion and acute respiratory distress and associated hypotension    - Appreciate nephrology consultation and recommendations  - IV fluids have been avoided due to respiratory compromise  - Continue to monitor

## 2022-01-31 NOTE — ANESTHESIA PREPROCEDURE EVALUATION
Cardiology "Preoperative cardiovascular risk assessment - Steven is intermediate to high risk given his CAD history, age and aortic stenosis  However his intratrochanteric fracture needs to be fix for him to be mobile and therefore I would proceed at this risk  No preoperative cardiovascular testing needed  No changes in medical therapy made preoperatively  Follow volume status closely postoperatively "  Nephrology: "Acute kidney injury: Baseline creatinine 1 3-1 6 mg/dL  Creatinine 1 7 on admission, slowly rising and currently up to 2  16  CHF with valvular disease: Status post 2 doses of IV Lasix    No evidence of volume overload on exam     TTE 11/2021: AL25%, severe diastolic dysfxn, severe AS, mod MS, pasp 57    Hgb 8 1 downtrending    Procedure:  INSERTION NAIL IM FEMUR ANTEGRADE (TROCHANTERIC) (Right Leg Upper)    Relevant Problems   ANESTHESIA (within normal limits)      CARDIO   (+) Aneurysm of abdominal aorta (HCC)   (+) CAD (coronary artery disease)   (+) Chronic a-fib (HCC)   (+) Coronary artery arteriosclerosis   (+) Dyspnea on exertion   (+) Essential hypertension   (+) Hyperlipidemia      /RENAL   (+) Acute kidney injury superimposed on chronic kidney disease (HCC)   (+) Benign hypertension with chronic kidney disease, stage III (HCC)   (+) Benign prostatic hyperplasia   (+) Kidney stone on left side   (+) Nephrolithiasis   (+) Stage 3a chronic kidney disease (HCC)      HEMATOLOGY   (+) Anemia      MUSCULOSKELETAL   (+) Chronic gout without tophus   (+) Fibromyalgia      NEURO/PSYCH   (+) Chronic pain syndrome   (+) Fibromyalgia   (+) JOO (generalized anxiety disorder)   (+) Moderate or severe vision impairment, one eye      PULMONARY   (+) COPD (chronic obstructive pulmonary disease) (HCC)   (+) Centrilobular emphysema (HCC)   (+) Dyspnea on exertion   (+) Mixed simple and mucopurulent chronic bronchitis (HCC)   (+) JOVI (obstructive sleep apnea)        Physical Exam    Airway    Mallampati score: III  TM Distance: <3 FB  Neck ROM: limited     Dental       Cardiovascular      Pulmonary  Pulmonary exam normal     Other Findings  Per pt denies anything remaining that is loose or removeable      Anesthesia Plan  ASA Score- 4     Anesthesia Type- general and regional with ASA Monitors  Additional Monitors:   Airway Plan: ETT  Comment: Per patient, appropriately NPO, denies active CP/SOB/wheezing/symptoms related to heartburn/nausea/vomiting  Plan Factors-Exercise tolerance (METS): <4 METS  Chart reviewed  EKG reviewed  Imaging results reviewed  Existing labs reviewed  Patient summary reviewed  Patient is not a current smoker  Induction- intravenous  Postoperative Plan- Plan for postoperative opioid use  Informed Consent- Anesthetic plan and risks discussed with patient  I personally reviewed this patient with the CRNA  Discussed and agreed on the Anesthesia Plan with the CRNA  Alexi Bond

## 2022-01-31 NOTE — ASSESSMENT & PLAN NOTE
Wt Readings from Last 3 Encounters:   01/24/22 131 kg (289 lb)   01/06/22 130 kg (286 lb)   01/04/22 133 kg (294 lb)     - Chronic history of diastolic CHF with evidence of acute pulmonary edema  - Appreciate Cardiology evaluation and recommendations  - In setting of mildly elevated creatinine and suspected mild [place]NEE superimposed on chronic kidney disease stage 3, Nephrology consulted   - Two doses of 40 mg IV Lasix given 1/30 after patient had increased oxygen demand and CXR indicated mild pulmonary vascular congestion   - Patient's oxygen demands from CHF make him high risk of prolonged intubation post-operatively  - Respiratory protocol  - Avoid IV fluids  - ECHO pending, does not have to be completed pre-operatively  - Monitor volume status closely with daily weights and accurate I/Os    - Continue home medication therapy post-operatively

## 2022-01-31 NOTE — OCCUPATIONAL THERAPY NOTE
Occupational Therapy Cancellation Note         Patient Name: Nia Andersen  [id number] Date: 1/31/2022 01/31/22 0747   OT Last Visit   OT Visit Date 01/31/22   Note Type   Note type Cancelled Session   Cancel Reasons Patient to operating room   Additional Comments OT orders received  Chart review completed  Pt with closed R femur fx and is tentative for OR today for IM nail  Will hold OT eval at this time and see post op as appropriate/able          Esther Cee OTR/TIFFANY

## 2022-01-31 NOTE — PLAN OF CARE
Problem: Potential for Falls  Goal: Patient will remain free of falls  Description: INTERVENTIONS:  - Educate patient/family on patient safety including physical limitations  - Instruct patient to call for assistance with activity   - Consult OT/PT to assist with strengthening/mobility   - Keep Call bell within reach  - Keep bed low and locked with side rails adjusted as appropriate  - Keep care items and personal belongings within reach  - Initiate and maintain comfort rounds  - Make Fall Risk Sign visible to staff       Problem: Prexisting or High Potential for Compromised Skin Integrity  Goal: Skin integrity is maintained or improved  Description: INTERVENTIONS:  - Identify patients at risk for skin breakdown  - Assess and monitor skin integrity  - Assess and monitor nutrition and hydration status  - Monitor labs   - Assess for incontinence   - Turn and reposition patient  - Assist with mobility/ambulation  - Relieve pressure over bony prominences  - Avoid friction and shearing  - Provide appropriate hygiene as needed including keeping skin clean and dry  - Evaluate need for skin moisturizer/barrier cream  - Collaborate with interdisciplinary team   - Patient/family teaching  - Consider wound care consult   Outcome: Progressing     Problem: MOBILITY - ADULT  Goal: Maintain or return to baseline ADL function  Description: INTERVENTIONS:  -  Assess patient's ability to carry out ADLs; assess patient's baseline for ADL function and identify physical deficits which impact ability to perform ADLs (bathing, care of mouth/teeth, toileting, grooming, dressing, etc )  - Assess/evaluate cause of self-care deficits   - Assess range of motion  - Assess patient's mobility; develop plan if impaired  - Assess patient's need for assistive devices and provide as appropriate  - Encourage maximum independence but intervene and supervise when necessary  - Involve family in performance of ADLs  - Assess for home care needs following discharge   - Consider OT consult to assist with ADL evaluation and planning for discharge  - Provide patient education as appropriate  Outcome: Progressing

## 2022-01-31 NOTE — ASSESSMENT & PLAN NOTE
Lab Results   Component Value Date    EGFR 32 01/30/2022    EGFR 36 01/29/2022    EGFR 45 11/24/2021    CREATININE 1 89 (H) 01/30/2022    CREATININE 1 70 (H) 01/29/2022    CREATININE 1 57 (H) 01/05/2022     - Patient with chronic history of CKD stage 3 with baseline creatinine appearing to be between 1 4 and 1 5   - Mild elevation of creatinine to 1 7 on presentation on 01/29/2022 and continued elevation of Cr to 1 90  - Appreciate Nephrology evaluation recommendations      - Two doses of IV lasix 40 mg given over the past 24 hours  - Avoid hypotension   - Continue to monitor

## 2022-01-31 NOTE — ASSESSMENT & PLAN NOTE
- Patient with chronic lumbar radiculopathy and pain in his legs  Patient reports acutely worsened pain in his calves and feet since his fall   - Neurovascular exam intact on initial evaluation   - T and L-spine x-rays indicate no fractures  - no further workup indicated at this time  - Continue multimodal analgesic regimen including gabapentin  - Outpatient follow-up with PCP and pain provider

## 2022-01-31 NOTE — NURSING NOTE
Patient found in bed, bed in lowest position, patient resting comfortably and no complaints at this time  Patient oxygen saturation noted at 88% on 6 L NC  Respiratory notified  Patient placed on mid flow on 7L NC due to low spo2  Spo2 now 94% 7L [address]   Respiratory also consulted for cpap for sleep  Patient's daughter aware  Will continue to monitor       Andreina Mendoza, RN

## 2022-01-31 NOTE — PROGRESS NOTES
Trauma  Post-Op Check Progress Note   Michelle Tracy 80 y o  male MRN: [id number]  Unit/Bed#: W -01 Encounter: [id number]    Assessment and Plan:    27-year-old male with Right intertrochanteric femur fracture status post insertion nail IM right femur    - P R N  Analgesia  - Encouraged incentive spirometry use  - DVT proph: heparin/SCD   - post op abx per ortho   - 2000 BMP    - Am bmp/cbc   - PT/OT evaluation and treatment as indicated      Subjective/Objective     Subjective: Reports pain in right leg with movement, the same as prior to surgery  Denies n/v  No other complaints    Objective:     Blood pressure 152/69, pulse 78, temperature 98 °F (36 7 °C), resp  rate 22, height 6' 1" (1 854 m), weight 101 kg (223 lb), SpO2 93 %  ,Body mass index is 29 42 kg/m²  Intake/Output Summary (Last 24 hours) at 1/31/2022 1830  Last data filed at 1/31/2022 1601  Gross per 24 hour   Intake 790 ml   Output 1525 ml   Net -735 ml       Invasive Devices  Report    Peripheral Intravenous Line            Peripheral IV 01/29/22 Right Antecubital 2 days    Peripheral IV 01/31/22 Right Hand <1 day          Drain            Urethral Catheter 16 Fr  1 day                Physical Exam:    GENERAL APPEARANCE: Patient in no acute distress  HEENT: NCAT; PERRL, EOMs intact; Mucous membranes moist  CV: Regular rate and rhythm; + S1, S2; no murmur/gallops/rubs appreciated  LUNGS: Clear to auscultation; no wheezes/rales/rhonci  ABD: NABS; soft; non-distended; non-tender  EXT: +2 pulses bilaterally upper & lower extremities; no clubbing/cyanosis/edema  RLE DVNI  NEURO: GCS 15; no focal neurologic deficits; neurovascularly intact  SKIN: Warm, dry and well perfused; no rash; no jaundice   Surgical dressing intact without strike through                [employer], VANESA  1/31/2022

## 2022-01-31 NOTE — ANESTHESIA POSTPROCEDURE EVALUATION
Post-Op Assessment Note    CV Status:  Stable    Pain management: adequate     Mental Status:  Alert and awake   Hydration Status:  Euvolemic   PONV Controlled:  Controlled   Airway Patency:  Patent      Post Op Vitals Reviewed: Yes      Staff: CRNA         No complications documented      BP   151/67   Temp   98 6   Pulse  79   Resp   16   SpO2   89%

## 2022-01-31 NOTE — CASE MANAGEMENT
Case Management Progress Note    Patient name Pee Palacios  Location W /W -20 MRN [id number]  : 1940 Date 2022       LOS (days): 2  Geometric Mean LOS (GMLOS) (days): 3 80  Days to GMLOS:1 7        OBJECTIVE:        Current admission status: Inpatient  Preferred Pharmacy:   [facility] [address] [address], [facility] [id number]  Phone: [phone] Fax: [phone] [facility] [place] [address]  Phone: [phone] Fax: [phone]    [facility] [address] [place] [address]  Phone: [phone] Fax: [phone]    Primary Care Provider: Thien Mosley DO    Primary Insurance: MEDICARE  Secondary Insurance: COMMERCIAL MISCELLANEOUS    PROGRESS NOTE:      CM tried to consult pt, pt off floor in OR   CM will follow-up in the am tomorrow

## 2022-01-31 NOTE — ASSESSMENT & PLAN NOTE
Lab Results   Component Value Date    EGFR 32 01/30/2022    EGFR 36 01/29/2022    EGFR 45 11/24/2021    CREATININE 1 89 (H) 01/30/2022    CREATININE 1 70 (H) 01/29/2022    CREATININE 1 57 (H) 01/05/2022     - Patient with history of chronic hypertension   - Monitor blood pressures with goal systolic pressure less than 160  - Appreciate Cardiology and Nephrology evaluation and recommendations   - Outpatient follow-up with PCP per routine

## 2022-01-31 NOTE — ANESTHESIA PROCEDURE NOTES
Peripheral Block    Patient location during procedure: holding area  Start time: 1/31/2022 11:45 AM  Reason for block: procedure for pain, at surgeon's request and post-op pain management  Staffing  Performed: Anesthesiologist   Anesthesiologist: Herrera Villanueva MD  Preanesthetic Checklist  Completed: patient identified, IV checked, site marked, risks and benefits discussed, surgical consent, monitors and equipment checked, pre-op evaluation and timeout performed  Peripheral Block  Patient position: left lateral decubitus  Prep: ChloraPrep  Patient monitoring: continuous pulse ox and frequent blood pressure checks  Anesthesia block type: PENG  Laterality: right  Injection technique: single-shot  Procedures: ultrasound guided, Ultrasound guidance required for the procedure to increase accuracy and safety of medication placement and decrease risk of complications    Ultrasound permanent image saved  Needle  Needle type: Stimuplex   Needle gauge: 22 G  Needle length: 10 cm  Needle localization: anatomical landmarks and ultrasound guidance  Test dose: negative  Assessment  Injection assessment: incremental injection, local visualized surrounding nerve on ultrasound, negative aspiration for CSF, negative aspiration for heme and no paresthesia on injection  Paresthesia pain: none  Heart rate change: no  Slow fractionated injection: yes  Post-procedure:  site cleaned  patient tolerated the procedure well with no immediate complications  Additional Notes  Single needle pass, needle visualized throughout

## 2022-01-31 NOTE — PROGRESS NOTES
[facility] PROGRESS NOTE   Amy Hernandez 80 y o  male MRN: [id number]  Unit/Bed#: W -01 Encounter: [id number]  Reason for Consult: SHAWNEE on CKD  24-year-old male with a history of CKD, CAD, hypertension, nephrolithiasis, diastolic CHF, valvular heart disease, gout, smoking history who was admitted to [facility] after falling and sustaining fracture of the proximal right femoral      ASSESSMENT and PLAN:  1  Acute kidney injury:    · Baseline creatinine 1 3-1 6 mg/dL  Creatinine 1 7 on admission, slowly rising and currently up to 2  16   · UA:  Microscopic hematuria November 2021  · Prior renal imaging April 2021 with simple cyst left kidney, nonobstructing calculi, no hydronephrosis or hydroureter  · Received 2 doses of Lasix 40 mg IV on 01/30  Chest x-ray on 01/30 showed mild pulmonary vascular congestion with trace right pleural effusion  · Blood pressure intermittently soft  · NPO past midnight  · Awaiting results of echocardiogram  · Roca catheter in place with adequate urine output  · Etiology:  Increasing creatinine in the setting of soft blood pressure, diuresis  · Plan/recommendations:  · Plendil, did not meet blood pressure parameter  · No diuretic  · Hesitate to give IV fluids in the setting of congestive changes on chest x-ray yesterday and cardiac history  Results of echocardiogram would be helpful     2  Chronic kidney disease, stage III:    · Follows with Dr Raghavendra Candelario  3  Right proximal femoral fracture:    · Anticipating OR today at 1300 hours  4  CHF with valvular disease:    · Status post 2 doses of IV Lasix  No evidence of volume overload on exam   · Prior echocardiogram ejection fraction 55%, moderate to severe aortic stenosis, moderate pulmonary hypertension, mild MR  5  Anemia:  Hemoglobin declining  Management per primary team  6  Electrolytes:  Acceptable  7   Other:  · Ninety pack year smoking history    DISPOSITION:  Spoke with surgery and daughter who was at bedside concerning plan of care  Denney catheter, strict I&O    SUBJECTIVE / 24H INTERVAL HISTORY:  No acute complaints although stating he is thirsty  No pain  No nausea vomiting diarrhea  Overnight events recorded    OBJECTIVE:  Current Weight: Weight - Scale: 101 kg (223 lb 1 7 oz)  Vitals:    01/31/22 0238 01/31/22 0547 01/31/22 0617 01/31/22 0724   BP: (!) 107/48  118/55    BP Location:   Left arm    Pulse: 73  78    Resp:   19    Temp: 99 °F (37 2 °C)  99 °F (37 2 °C)    TempSrc:   Oral    SpO2: (!) 87%  96% 96%   Weight:  101 kg (223 lb 1 7 oz)         Intake/Output Summary (Last 24 hours) at 1/31/2022 0845  Last data filed at 1/31/2022 0617  Gross per 24 hour   Intake 0 ml   Output 2050 ml   Net -2050 ml     General: NAD, comfortably lying in bed  Skin: no rash, warm and dry  Eyes: anicteric sclera  ENT:  Oropharynx moist but patient has taken medication and had a drink of water  Neck: supple, no JVD  Chest: CTA b/l, no ronchii, no wheeze, no rubs, no rales, normal effort  CVS: A5Y5, systolic murmur, no gallop, no rub, normal rate, regular rate  Abdomen: soft, nontender, nl sounds  Extremities: 0-trace edema LE b/l    No mottling or cyanosis  :  denney draining clear yellow urine  Neuro: AAOX3  Psych: normal affect  Medications:    Current Facility-Administered Medications:     acetaminophen (TYLENOL) tablet 975 mg, 975 mg, Oral, Q8H [address], Josep Alvarado PA-C, 975 mg at 01/30/22 2116    albuterol (PROVENTIL HFA,VENTOLIN HFA) inhaler 2 puff, 2 puff, Inhalation, Q6H PRN, Ayo Escoto PA-C, 2 puff at 01/30/22 1136    allopurinol (ZYLOPRIM) tablet 200 mg, 200 mg, Oral, Daily, Josep Alvarado PA-C, 200 mg at 01/30/22 1134    ascorbic acid (VITAMIN C) tablet 500 mg, 500 mg, Oral, Daily, Josep Alvarado PA-C, 500 mg at 01/30/22 1135    calcium carbonate-vitamin D (OSCAL-D) 500 mg-200 units per tablet 2 tablet, 2 tablet, Oral, HS, Josep Alvarado PA-C, 2 tablet at 01/30/22 2117    cholecalciferol (VITAMIN D3) tablet 1,000 Units, 1,000 Units, Oral, Daily, Ayo Escoto PA-C, 1,000 Units at 01/30/22 1136    cyanocobalamin (VITAMIN B-12) tablet 100 mcg, 100 mcg, Oral, Daily, Ayo Escoto PA-C, 100 mcg at 01/30/22 1136    DULoxetine (CYMBALTA) delayed release capsule 60 mg, 60 mg, Oral, Daily, Josep Alvarado PA-C, 60 mg at 01/30/22 1134    felodipine (PLENDIL) 24 hr tablet 5 mg, 5 mg, Oral, Daily, Fabienne Homans, MD, 5 mg at 01/30/22 1132    finasteride (PROSCAR) tablet 5 mg, 5 mg, Oral, Daily, Josep Alvarado PA-C, 5 mg at 01/30/22 1133    fluticasone-vilanterol (BREO ELLIPTA) 200-25 MCG/INH inhaler 1 puff, 1 puff, Inhalation, Daily, Josep Alvarado PA-C, 1 puff at 01/30/22 1140    gabapentin (NEURONTIN) capsule 300 mg, 300 mg, Oral, BID, Josep Alvarado PA-C, 300 mg at 01/30/22 1133    glucosamine sulfate capsule 500 mg, 500 mg, Oral, BID, Hannah Euceda MD, 500 mg at 01/30/22 1838    heparin (porcine) subcutaneous injection 5,000 Units, 5,000 Units, Subcutaneous, Q8H [address], 5,000 Units at 01/30/22 2116 **AND** Platelet count, , , Once, Josep Alvarado PA-C    HYDROmorphone (DILAUDID) injection 0 2 mg, 0 2 mg, Intravenous, Q4H PRN, Ayo Escoto PA-C    levalbuterol (XOPENEX) inhalation solution 0 63 mg, 0 63 mg, Nebulization, Q8H PRN, Chase Rivero PA-C, 0 63 mg at 01/30/22 6111    levalbuterol (XOPENEX) inhalation solution 1 25 mg, 1 25 mg, Nebulization, BID, Hannah Euceda MD, 1 25 mg at 01/31/22 0721    naloxone (NARCAN) 0 04 mg/mL syringe 0 04 mg, 0 04 mg, Intravenous, Q1MIN PRN, Ayo Escoto PA-C    oxyCODONE (ROXICODONE) IR tablet 2 5 mg, 2 5 mg, Oral, Q4H PRN, Ayo Esocto PA-C    oxyCODONE (ROXICODONE) IR tablet 5 mg, 5 mg, Oral, Q4H PRN, Ayo Escoto PA-C, 5 mg at 01/30/22 0541    polyethylene glycol (MIRALAX) packet 17 g, 17 g, Oral, Daily PRN, Ayo Escoto PA-C    pravastatin (PRAVACHOL) tablet 40 mg, 40 mg, Oral, Daily With Stephen Officer, VANESA, 40 mg at 01/30/22 1641    senna-docusate sodium (SENOKOT S) 8 6-50 mg per tablet 1 tablet, 1 tablet, Oral, BID, Mendy Fernandes PA-C, 1 tablet at 01/30/22 1135    sodium chloride 0 9 % inhalation solution 3 mL, 3 mL, Nebulization, BID, Anurag Thomas MD, 3 mL at 01/31/22 6046    Laboratory Results:  Results from last 7 days   Lab Units 01/31/22  0510 01/30/22  0537 01/29/22  1016   WBC Thousand/uL 11 24* 13 15* 9 20   HEMOGLOBIN g/dL 8 1* 8 9* 9 9*   HEMATOCRIT % 27 1* 30 6* 33 7*   PLATELETS Thousands/uL 226 237 238   POTASSIUM mmol/L 3 9 4 2 3 8   CHLORIDE mmol/L 101 101 100   CO2 mmol/L 26 26 28   BUN mg/dL 49* 41* 38*   CREATININE mg/dL 2 16* 1 89* 1 70*   CALCIUM mg/dL 8 8 9 1 9 3   MAGNESIUM mg/dL 2 3  --   --

## 2022-01-31 NOTE — PROGRESS NOTES
Orthopedics   Nataliramon Nelson 80 y o  male MRN: [id number]  Unit/Bed#: W -01      Subjective:  81 y o male seen and evaluated this morning  He notes pain in the right hip is controlled this time  No chest pain shortness of breath  No fevers or chills      Labs:  0   Lab Value Date/Time    HCT 27 1 (L) 01/31/2022 0510    HCT 30 6 (L) 01/30/2022 0537    HCT 33 7 (L) 01/29/2022 1016    HCT 45 9 01/07/2017 0834    HCT 43 8 07/16/2016 0752    HCT 41 0 04/07/2016 1141    HGB 8 1 (L) 01/31/2022 0510    HGB 8 9 (L) 01/30/2022 0537    HGB 9 9 (L) 01/29/2022 1016    HGB 15 3 01/07/2017 0834    HGB 14 9 07/16/2016 0752    HGB 13 4 04/07/2016 1141    INR 1 57 (H) 11/21/2021 1330    WBC 11 24 (H) 01/31/2022 0510    WBC 13 15 (H) 01/30/2022 0537    WBC 9 20 01/29/2022 1016    WBC 8 40 03/20/2018 0000    WBC 11-30 (A) 06/15/2017 1023    WBC 9 9 01/07/2017 0834    WBC 9 2 07/16/2016 0752       Meds:    Current Facility-Administered Medications:     acetaminophen (TYLENOL) tablet 975 mg, 975 mg, Oral, Q8H [address], Josep Alvarado PA-C, 975 mg at 01/30/22 2116    albuterol (PROVENTIL HFA,VENTOLIN HFA) inhaler 2 puff, 2 puff, Inhalation, Q6H PRN, Oleg Nunez PA-C, 2 puff at 01/30/22 1136    allopurinol (ZYLOPRIM) tablet 200 mg, 200 mg, Oral, Daily, Josep Alvarado PA-C, 200 mg at 01/31/22 5243    ascorbic acid (VITAMIN C) tablet 500 mg, 500 mg, Oral, Daily, Josep Alvarado PA-C, 500 mg at 01/31/22 0911    bupivacaine liposomal (EXPAREL) 1 3 % injection 20 mL, 20 mL, Infiltration, Once, Cherelle Taylor MD    calcium carbonate-vitamin D (OSCAL-D) 500 mg-200 units per tablet 2 tablet, 2 tablet, Oral, HS, Josep Alvarado PA-C, 2 tablet at 01/30/22 2117    cholecalciferol (VITAMIN D3) tablet 1,000 Units, 1,000 Units, Oral, Daily, Oleg Nunez PA-C, 1,000 Units at 01/31/22 0910    cyanocobalamin (VITAMIN B-12) tablet 100 mcg, 100 mcg, Oral, Daily, Josep Alvarado PA-C, 100 mcg at 01/31/22 0910    DULoxetine (CYMBALTA) delayed release capsule 60 mg, 60 mg, Oral, Daily, Josep Alvarado PA-C, 60 mg at 01/31/22 0910    felodipine (PLENDIL) 24 hr tablet 5 mg, 5 mg, Oral, Daily, Diana Luu MD, 5 mg at 01/30/22 1132    finasteride (PROSCAR) tablet 5 mg, 5 mg, Oral, Daily, Josep Alvarado PA-C, 5 mg at 01/31/22 0911    fluticasone-vilanterol (BREO ELLIPTA) 200-25 MCG/INH inhaler 1 puff, 1 puff, Inhalation, Daily, Brynn Bermeo PA-C, 1 puff at 01/31/22 0912    gabapentin (NEURONTIN) capsule 300 mg, 300 mg, Oral, BID, Josep Alvarado PA-C, 300 mg at 01/30/22 1133    glucosamine sulfate capsule 500 mg, 500 mg, Oral, BID, Deniz Faustin MD, 500 mg at 01/31/22 0910    heparin (porcine) subcutaneous injection 5,000 Units, 5,000 Units, Subcutaneous, Q8H [facility], 5,000 Units at 01/30/22 2116 **AND** Platelet count, , , Once, Josep Alvarado PA-C    HYDROmorphone (DILAUDID) injection 0 2 mg, 0 2 mg, Intravenous, Q4H PRN, Brynn Pour, PA-C    levalbuterol (XOPENEX) inhalation solution 0 63 mg, 0 63 mg, Nebulization, Q8H PRN, Mortimer Keeling [employer], PA-C, 0 63 mg at 01/30/22 5857    levalbuterol (XOPENEX) inhalation solution 1 25 mg, 1 25 mg, Nebulization, BID, Deniz Faustin MD, 1 25 mg at 01/31/22 0721    naloxone (NARCAN) 0 04 mg/mL syringe 0 04 mg, 0 04 mg, Intravenous, Q1MIN PRN, Brynn Pour, PA-C    oxyCODONE (ROXICODONE) IR tablet 2 5 mg, 2 5 mg, Oral, Q4H PRN, Brynn Pour, PA-C    oxyCODONE (ROXICODONE) IR tablet 5 mg, 5 mg, Oral, Q4H PRN, Brynn Pour, PA-C, 5 mg at 01/30/22 0541    polyethylene glycol (MIRALAX) packet 17 g, 17 g, Oral, Daily PRN, Brynn Bermeo PA-C    pravastatin (PRAVACHOL) tablet 40 mg, 40 mg, Oral, Daily With Dinner, Josep Alvarado PA-C, 40 mg at 01/30/22 1641    senna-docusate sodium (SENOKOT S) 8 6-50 mg per tablet 1 tablet, 1 tablet, Oral, BID, Brynn Bermeo PA-C, 1 tablet at 01/31/22 0911    sodium chloride 0 9 % inhalation solution 3 mL, 3 mL, Nebulization, BID, Deniz Faustin MD, 3 mL at 01/31/22 1366    Blood Culture:   Lab Results   Component Value Date    BLOODCX No Growth After 5 Days  09/13/2020    BLOODCX No Growth After 5 Days  09/13/2020       Wound Culture:   No results found for: WOUNDCULT    Ins and Outs:  I/O last 24 hours: In: 0   Out: 2050 [Urine:2050]          Physical Exam:  Vitals:    01/31/22 0912   BP: 106/52   Pulse: 76   Resp:    Temp:    SpO2: 95%     right Lower Extremity extremity:  · Skin intact, right lower extremity is shortened and externally rotated  · Tenderness palpation over the hip  · Sensation intact light touch L3 through S1  · Motor intact knee flexion extension, ankle dorsiflexion plantar flexion  · Palpable DP pulse    Assessment: 81 y o male with right intertrochanteric femur fracture    Plan:  · NPO  · NWB RLE  · Informed consent obtained  · Medical management per primary team and Cardiology, Nephrology  · Patient's creatinine was slightly increased this morning to 2 1, awaiting input from nephrology attending regarding clearance for OR today will follow up with them again on this shortly  · To OR today for operative fixation of fracture pending nephrology clearance and clearance from primary team, per cardiology consultation note yesterday they are okay with us proceeding to OR from cardiac standpoint  · Analgesics p r n    · Dispo: Ortho will follow      Na Dixon PA-C

## 2022-01-31 NOTE — ASSESSMENT & PLAN NOTE
- Patient with chronic significant history of CAD  Patient follows with Dr Almeida Point  - Appreciate [facility] evaluation and recommendations including discontinuing Plavix and continuing aspirin on discharge  - No evidence of acute cardiac event at this time  - EKG indicates ventricular pacing rhythm  - Continue home medication therapy as appropriate

## 2022-01-31 NOTE — PROGRESS NOTES
[facility]  Progress Note Veronika Sr 1940, 80 y o  male MRN: [id number]  Unit/Bed#: W -01 Encounter: [id number]  Primary Care Provider: Sandra Sloan DO   Date and time admitted to hospital: 1/29/2022  9:34 AM    Disposition: Patient's pulmonary exam is significantly improved from yesterday  He is on 6 L O2 [address] saturating at 95% and greater  However, his SHAWNEE has worsened  His respiratory status has improved from yesterday,  And Patient would benefit from going to the OR today for right femoral IM nail placement, however he is at high risk of prolonged intubated due to respiratory compromise  Anticipate upgrade in level of care to the intensive care unit post-operatively  Fall  Assessment & Plan  - Status post mechanical fall on ice with the below noted injuries and issues  - Fall precautions  - Geriatric Medicine consultation for evaluation, medication review and recommendations   - PT and OT evaluation and treatment as indicated  - Case Management consultation for disposition planning  CAD (coronary artery disease)  Assessment & Plan  - Patient with chronic significant history of CAD  Patient follows with Dr Dexter Núñez  - Appreciate [facility]'s Cardiology evaluation and recommendations including discontinuing Plavix and continuing aspirin on discharge  - No evidence of acute cardiac event at this time  - EKG indicates ventricular pacing rhythm  - Continue home medication therapy as appropriate        Stage 3a chronic kidney disease [facility])  Assessment & Plan  Lab Results   Component Value Date    EGFR 32 01/30/2022    EGFR 36 01/29/2022    EGFR 45 11/24/2021    CREATININE 1 89 (H) 01/30/2022    CREATININE 1 70 (H) 01/29/2022    CREATININE 1 57 (H) 01/05/2022     - Patient with chronic history of CKD stage 3 with baseline creatinine appearing to be between 1 4 and 1 5   - Mild elevation of creatinine to 1 7 on presentation on 01/29/2022 and continued elevation of Cr to 1 90  - Appreciate Nephrology evaluation recommendations  - Two doses of IV lasix 40 mg given over the past 24 hours  - Avoid hypotension   - Continue to monitor    Lumbar radiculopathy  Assessment & Plan  - Patient with chronic lumbar radiculopathy and pain in his legs  Patient reports acutely worsened pain in his calves and feet since his fall   - Neurovascular exam intact on initial evaluation   - T and L-spine x-rays indicate no fractures  - no further workup indicated at this time  - Continue multimodal analgesic regimen including gabapentin  - Outpatient follow-up with PCP and pain provider  Chronic a-fib ([facility])  Assessment & Plan  - Chronic history of atrial fibrillation   - Patient normally anticoagulated with Xarelto  Will hold anticoagulant therapy in anticipation of orthopedic operative intervention   - Plan to resume anticoagulation postoperatively  - Monitor for adequate heart rate control   - Continue home medication regimen as appropriate otherwise    - Appreciate Cardiology evaluation and recommendations   - Outpatient follow-up per team     Chronic diastolic CHF (congestive heart failure) ([facility])  Assessment & Plan  Wt Readings from Last 3 Encounters:   01/24/22 131 kg (289 lb)   01/06/22 130 kg (286 lb)   01/04/22 133 kg (294 lb)     - Chronic history of diastolic CHF with evidence of acute pulmonary edema  - Appreciate Cardiology evaluation and recommendations  - In setting of mildly elevated creatinine and suspected mild SHAWNEE superimposed on chronic kidney disease stage 3, Nephrology consulted   - Two doses of 40 mg IV Lasix given 1/30 after patient had increased oxygen demand and CXR indicated mild pulmonary vascular congestion   - Patient's oxygen demands from CHF make him high risk of prolonged intubation post-operatively  - Respiratory protocol  - Avoid IV fluids  - ECHO pending, does not have to be completed pre-operatively  - Monitor volume status closely with daily weights and accurate I/Os  - Continue home medication therapy post-operatively      Acute kidney injury superimposed on chronic kidney disease [facility])  Assessment & Plan  Lab Results   Component Value Date    EGFR 27 01/31/2022    EGFR 32 01/30/2022    EGFR 36 01/29/2022    CREATININE 2 16 (H) 01/31/2022    CREATININE 1 89 (H) 01/30/2022    CREATININE 1 70 (H) 01/29/2022     - Patient with chronic history of CKD stage 3 with baseline creatinine appearing to be between 1 4 and 1 5   - Cr elevated on admission and has been worsening due to use of diuretics in the setting of pulmonary vascular congestion and acute respiratory distress and associated hypotension    - Appreciate nephrology consultation and recommendations  - IV fluids have been avoided due to respiratory compromise  - Continue to monitor    Benign hypertension with chronic kidney disease, stage III [facility])  Assessment & Plan  Lab Results   Component Value Date    EGFR 32 01/30/2022    EGFR 36 01/29/2022    EGFR 45 11/24/2021    CREATININE 1 89 (H) 01/30/2022    CREATININE 1 70 (H) 01/29/2022    CREATININE 1 57 (H) 01/05/2022     - Patient with history of chronic hypertension   - Monitor blood pressures with goal systolic pressure less than 160  - Appreciate Cardiology and Nephrology evaluation and recommendations   - Outpatient follow-up with PCP per routine  Chronic pain syndrome  Assessment & Plan  - Patient with chronic pain syndrome   - Continue multimodal analgesic regimen with additional agents added/adjustments made in setting of acute pain secondary to traumatic injury  - Bowel regimen while on opioid therapy  - APS consult to assist with acute on chronic pain and anticipated postoperative pain  - Outpatient follow-up with PCP and pain provider  * Closed fracture of right femur [facility])  Assessment & Plan  - Acute right intertrochanteric femur fracture, present on admission   - Appreciate Orthopedic surgery evaluation and recommendations    - Plan for OR today 1/31 for right femur IM nail  - Patient's respiratory status has improved with multiple interventions and cardiology, nephrology have evaluated the patient  - Cardiology consultation indicates patient is intermediate to high perioperative risk, however it is a necessary surgery in order for the patient to ambulate  - Continue to hold home Xarelto  Evaluate post-operatively if patient can resume if Hgb is stable  - Maintain non-weightbearing status on the right lower extremity   - Monitor right lower extremity neurovascular exam   - Continue multimodal analgesic regimen  - Initiate chemical DVT prophylaxis  - PT and OT evaluation and treatment as indicated  SUBJECTIVE:  Chief Complaint: "My hip hurts"    Subjective: Patient reports right hip pain  Patient's daughter is at bedside and reports that her father slept through the night  She reports he was unable to tolerate the CPAP machine, but has been breathing normally on the mid flow nasal cannula  Patient admits that his breathing feels good, denies chest pain, shortness of breath, abdominal pain, numbness or tingling        OBJECTIVE:     Meds/Allergies     Current Facility-Administered Medications:     acetaminophen (TYLENOL) tablet 975 mg, 975 mg, Oral, Q8H [address], Josep Alvarado PA-C, 975 mg at 01/30/22 2116    albuterol (PROVENTIL HFA,VENTOLIN HFA) inhaler 2 puff, 2 puff, Inhalation, Q6H PRN, Xenia Pickard PA-C, 2 puff at 01/30/22 1136    allopurinol (ZYLOPRIM) tablet 200 mg, 200 mg, Oral, Daily, Josep Alvarado PA-C, 200 mg at 01/30/22 1134    ascorbic acid (VITAMIN C) tablet 500 mg, 500 mg, Oral, Daily, Josep Alvarado PA-C, 500 mg at 01/30/22 1135    calcium carbonate-vitamin D (OSCAL-D) 500 mg-200 units per tablet 2 tablet, 2 tablet, Oral, HS, Josep Alvarado PA-C, 2 tablet at 01/30/22 2117    cholecalciferol (VITAMIN D3) tablet 1,000 Units, 1,000 Units, Oral, Daily, Xenia Pickard PA-C, 1,000 Units at 01/30/22 1136    cyanocobalamin (VITAMIN B-12) tablet 100 mcg, 100 mcg, Oral, Daily, Josep Alvarado PA-C, 100 mcg at 01/30/22 1136    DULoxetine (CYMBALTA) delayed release capsule 60 mg, 60 mg, Oral, Daily, Josep Alvarado PA-C, 60 mg at 01/30/22 1134    felodipine (PLENDIL) 24 hr tablet 5 mg, 5 mg, Oral, Daily, Luh Red MD, 5 mg at 01/30/22 1132    finasteride (PROSCAR) tablet 5 mg, 5 mg, Oral, Daily, Josep Alvarado PA-C, 5 mg at 01/30/22 1133    fluticasone-vilanterol (BREO ELLIPTA) 200-25 MCG/INH inhaler 1 puff, 1 puff, Inhalation, Daily, Josep Alvarado PA-C, 1 puff at 01/30/22 1140    gabapentin (NEURONTIN) capsule 300 mg, 300 mg, Oral, BID, Josep Alvarado PA-C, 300 mg at 01/30/22 1133    glucosamine sulfate capsule 500 mg, 500 mg, Oral, BID, Adamevelia Barroso MD, 500 mg at 01/30/22 1838    heparin (porcine) subcutaneous injection 5,000 Units, 5,000 Units, Subcutaneous, Q8H [address], 5,000 Units at 01/30/22 2116 **AND** Platelet count, , , Once, Josep Alvarado PA-C    HYDROmorphone (DILAUDID) injection 0 2 mg, 0 2 mg, Intravenous, Q4H PRN, Oleg Nunez PA-C    levalbuterol (XOPENEX) inhalation solution 0 63 mg, 0 63 mg, Nebulization, Q8H PRN, Shemar Morrison VANESA Rivero, 0 63 mg at 01/30/22 5016    levalbuterol (XOPENEX) inhalation solution 1 25 mg, 1 25 mg, Nebulization, BID, Adam Barroso MD    naloxone [facility]) 0 04 mg/mL syringe 0 04 mg, 0 04 mg, Intravenous, Q1MIN PRN, Oleg Nunez PA-C    oxyCODONE (ROXICODONE) IR tablet 2 5 mg, 2 5 mg, Oral, Q4H PRN, Oleg Nunez PA-C    oxyCODONE (ROXICODONE) IR tablet 5 mg, 5 mg, Oral, Q4H PRN, Oleg Nunez PA-C, 5 mg at 01/30/22 0541    polyethylene glycol (MIRALAX) packet 17 g, 17 g, Oral, Daily PRN, Oleg Nunez PA-C    pravastatin (PRAVACHOL) tablet 40 mg, 40 mg, Oral, Daily With Dinner, Josep Alvarado PA-C, 40 mg at 01/30/22 1641    senna-docusate sodium (SENOKOT S) 8 6-50 mg per tablet 1 tablet, 1 tablet, Oral, BID, Oleg Nunez PA-C, 1 tablet at 01/30/22 1135    sodium chloride 0 9 % inhalation solution 3 mL, 3 mL, Nebulization, BID, Hannah Euceda MD     Vitals:   Vitals:    01/31/22 0617   BP: 118/55   Pulse: 78   Resp: 19   Temp: 99 °F (37 2 °C)   SpO2: 96%       Intake/Output:  I/O       01/29 0701 01/30 0700 01/30 0701 01/31 0700    Urine 1320 1600    Total Output 1320 1600    Net -1320 -1600                 Nutrition/GI Proph/Bowel Reg: Senokot S    Physical Exam:   GENERAL APPEARANCE: Patient in no acute distress  HEENT: NCAT; PERRL, EOMs intact; Mucous membranes dry; midflow nasal cannula in place  CV: Regular rate and rhythm; no murmur/gallops/rubs appreciated  CHEST / LUNGS: Left sided rhonchi; lung sounds clear at the bases; patient is not using accessory muscles to breathe  ABD: NABS; soft; non-distended; non-tender  : Voiding  EXT: Right hip tenderness; +2 pulses bilaterally upper & lower extremities; no edema  NEURO: GCS 15; no focal neurologic deficits; neurovascularly intact  SKIN: Warm, dry and well perfused; no rash; no jaundice  Invasive Devices  Report    Peripheral Intravenous Line            Peripheral IV 01/29/22 Right Antecubital 1 day          Drain            Urethral Catheter 16 Fr  <1 day                 Lab Results:   Results: I have personally reviewed pertinent reports   , BMP/CMP:   Lab Results   Component Value Date    SODIUM 137 01/31/2022    K 3 9 01/31/2022     01/31/2022    CO2 26 01/31/2022    BUN 49 (H) 01/31/2022    CREATININE 2 16 (H) 01/31/2022    CALCIUM 8 8 01/31/2022    EGFR 27 01/31/2022    and CBC:   Lab Results   Component Value Date    WBC 11 24 (H) 01/31/2022    HGB 8 1 (L) 01/31/2022    HCT 27 1 (L) 01/31/2022    MCV 83 01/31/2022     01/31/2022    MCH 24 7 (L) 01/31/2022    MCHC 29 9 (L) 01/31/2022    RDW 17 0 (H) 01/31/2022    MPV 9 5 01/31/2022    NRBC 0 01/31/2022     Imaging/EKG Studies: Results: I have personally reviewed pertinent reports      Other Studies:   XR chest portable   Final Result by Jj Wright Anita Fisher MD (01/30 1322)      Mild pulmonary venous congestion with trace right effusion  Workstation performed: [id number]         XR spine thoracic 3 vw   Final Result by George Craven MD (01/30 4423)      No acute osseous abnormality  Workstation performed: [id number]         XR spine lumbar 2 or 3 views injury   Final Result by George Craven MD (01/30 0154)      Normal examination  Workstation performed: [id number]         XR chest 1 view portable   Final Result by Mil Pantoja MD (01/29 1224)      No acute cardiopulmonary disease  Workstation performed: [id number]         XR pelvis ap only 1 or 2 vw   Final Result by Mil Pantoja MD (01/29 1227)      Acute intertrochanteric fracture of the right proximal femur  No associated hip dislocation  Workstation performed: [id number]         XR femur 2 vw right   Final Result by Mil Pantoja MD (01/29 1230)      Acute proximal femoral intertrochanteric fracture  Severe tricompartment osteoarthritis of the right knee              Workstation performed: [id number]             VTE Prophylaxis: Sequential compression device (Venodyne)  and Heparin

## 2022-01-31 NOTE — NUTRITION
01/31/22 1152   Recommendations/Interventions   Interventions Diet: to Advance  (Diet to Advance (s/p procedure; when medically appropriate))   Nutrition Recommendations Other (Specify)  (Diet Advancement Recommendation: Cardiac; Na2gm - If pt has poor intake/appetite recommend Regular diet till intake is adequate // Please obtain current body weight to confirm significant wt loss x 1wk)

## 2022-02-01 LAB
ALBUMIN SERPL BCP-MCNC: 2.4 G/DL (ref 3.5–5)
ALP SERPL-CCNC: 53 U/L (ref 46–116)
ALT SERPL W P-5'-P-CCNC: 15 U/L (ref 12–78)
ANION GAP SERPL CALCULATED.3IONS-SCNC: 10 MMOL/L (ref 4–13)
AST SERPL W P-5'-P-CCNC: 28 U/L (ref 5–45)
BASOPHILS # BLD AUTO: 0.02 THOUSANDS/ΜL (ref 0–0.1)
BASOPHILS NFR BLD AUTO: 0 % (ref 0–1)
BILIRUB SERPL-MCNC: 0.59 MG/DL (ref 0.2–1)
BUN SERPL-MCNC: 52 MG/DL (ref 5–25)
CALCIUM ALBUM COR SERPL-MCNC: 10 MG/DL (ref 8.3–10.1)
CALCIUM SERPL-MCNC: 8.7 MG/DL (ref 8.3–10.1)
CHLORIDE SERPL-SCNC: 98 MMOL/L (ref 100–108)
CO2 SERPL-SCNC: 26 MMOL/L (ref 21–32)
CREAT SERPL-MCNC: 1.87 MG/DL (ref 0.6–1.3)
EOSINOPHIL # BLD AUTO: 0 THOUSAND/ΜL (ref 0–0.61)
EOSINOPHIL NFR BLD AUTO: 0 % (ref 0–6)
ERYTHROCYTE [DISTWIDTH] IN BLOOD BY AUTOMATED COUNT: 16.6 % (ref 11.6–15.1)
GFR SERPL CREATININE-BSD FRML MDRD: 32 ML/MIN/1.73SQ M
GLUCOSE SERPL-MCNC: 139 MG/DL (ref 65–140)
HCT VFR BLD AUTO: 25.9 % (ref 36.5–49.3)
HGB BLD-MCNC: 7.6 G/DL (ref 12–17)
IMM GRANULOCYTES # BLD AUTO: 0.1 THOUSAND/UL (ref 0–0.2)
IMM GRANULOCYTES NFR BLD AUTO: 1 % (ref 0–2)
LYMPHOCYTES # BLD AUTO: 0.51 THOUSANDS/ΜL (ref 0.6–4.47)
LYMPHOCYTES NFR BLD AUTO: 5 % (ref 14–44)
MCH RBC QN AUTO: 24.8 PG (ref 26.8–34.3)
MCHC RBC AUTO-ENTMCNC: 29.3 G/DL (ref 31.4–37.4)
MCV RBC AUTO: 85 FL (ref 82–98)
MONOCYTES # BLD AUTO: 0.51 THOUSAND/ΜL (ref 0.17–1.22)
MONOCYTES NFR BLD AUTO: 5 % (ref 4–12)
NEUTROPHILS # BLD AUTO: 8.51 THOUSANDS/ΜL (ref 1.85–7.62)
NEUTS SEG NFR BLD AUTO: 89 % (ref 43–75)
NRBC BLD AUTO-RTO: 0 /100 WBCS
PLATELET # BLD AUTO: 190 THOUSANDS/UL (ref 149–390)
PMV BLD AUTO: 9.7 FL (ref 8.9–12.7)
POTASSIUM SERPL-SCNC: 4.2 MMOL/L (ref 3.5–5.3)
PROT SERPL-MCNC: 7 G/DL (ref 6.4–8.2)
RBC # BLD AUTO: 3.06 MILLION/UL (ref 3.88–5.62)
SODIUM SERPL-SCNC: 134 MMOL/L (ref 136–145)
WBC # BLD AUTO: 9.65 THOUSAND/UL (ref 4.31–10.16)

## 2022-02-01 PROCEDURE — 97163 PT EVAL HIGH COMPLEX 45 MIN: CPT

## 2022-02-01 PROCEDURE — 99232 SBSQ HOSP IP/OBS MODERATE 35: CPT | Performed by: INTERNAL MEDICINE

## 2022-02-01 PROCEDURE — 97530 THERAPEUTIC ACTIVITIES: CPT

## 2022-02-01 PROCEDURE — 99232 SBSQ HOSP IP/OBS MODERATE 35: CPT | Performed by: PHYSICIAN ASSISTANT

## 2022-02-01 PROCEDURE — 85025 COMPLETE CBC W/AUTO DIFF WBC: CPT | Performed by: STUDENT IN AN ORGANIZED HEALTH CARE EDUCATION/TRAINING PROGRAM

## 2022-02-01 PROCEDURE — 94760 N-INVAS EAR/PLS OXIMETRY 1: CPT

## 2022-02-01 PROCEDURE — 80053 COMPREHEN METABOLIC PANEL: CPT | Performed by: STUDENT IN AN ORGANIZED HEALTH CARE EDUCATION/TRAINING PROGRAM

## 2022-02-01 PROCEDURE — 99232 SBSQ HOSP IP/OBS MODERATE 35: CPT | Performed by: NURSE PRACTITIONER

## 2022-02-01 PROCEDURE — 99024 POSTOP FOLLOW-UP VISIT: CPT | Performed by: PHYSICIAN ASSISTANT

## 2022-02-01 PROCEDURE — 94640 AIRWAY INHALATION TREATMENT: CPT

## 2022-02-01 PROCEDURE — 97167 OT EVAL HIGH COMPLEX 60 MIN: CPT

## 2022-02-01 RX ORDER — ASPIRIN 81 MG/1
81 TABLET, CHEWABLE ORAL DAILY
Status: DISCONTINUED | OUTPATIENT
Start: 2022-02-02 | End: 2022-02-07 | Stop reason: HOSPADM

## 2022-02-01 RX ADMIN — Medication 1000 UNITS: at 08:30

## 2022-02-01 RX ADMIN — OXYCODONE HYDROCHLORIDE 5 MG: 5 TABLET ORAL at 10:18

## 2022-02-01 RX ADMIN — ALLOPURINOL 200 MG: 100 TABLET ORAL at 08:30

## 2022-02-01 RX ADMIN — ISODIUM CHLORIDE 3 ML: 0.03 SOLUTION RESPIRATORY (INHALATION) at 07:17

## 2022-02-01 RX ADMIN — HEPARIN SODIUM 5000 UNITS: 5000 INJECTION INTRAVENOUS; SUBCUTANEOUS at 23:21

## 2022-02-01 RX ADMIN — GABAPENTIN 300 MG: 300 CAPSULE ORAL at 18:01

## 2022-02-01 RX ADMIN — Medication 100 MCG: at 08:30

## 2022-02-01 RX ADMIN — HEPARIN SODIUM 5000 UNITS: 5000 INJECTION INTRAVENOUS; SUBCUTANEOUS at 14:37

## 2022-02-01 RX ADMIN — FINASTERIDE 5 MG: 5 TABLET, FILM COATED ORAL at 08:30

## 2022-02-01 RX ADMIN — Medication 500 MG: at 18:01

## 2022-02-01 RX ADMIN — OXYCODONE HYDROCHLORIDE 5 MG: 5 TABLET ORAL at 23:22

## 2022-02-01 RX ADMIN — GABAPENTIN 300 MG: 300 CAPSULE ORAL at 08:30

## 2022-02-01 RX ADMIN — CEFAZOLIN SODIUM 2000 MG: 2 SOLUTION INTRAVENOUS at 01:04

## 2022-02-01 RX ADMIN — SENNOSIDES AND DOCUSATE SODIUM 1 TABLET: 50; 8.6 TABLET ORAL at 08:30

## 2022-02-01 RX ADMIN — ISODIUM CHLORIDE 3 ML: 0.03 SOLUTION RESPIRATORY (INHALATION) at 20:16

## 2022-02-01 RX ADMIN — HEPARIN SODIUM 5000 UNITS: 5000 INJECTION INTRAVENOUS; SUBCUTANEOUS at 05:00

## 2022-02-01 RX ADMIN — SENNOSIDES AND DOCUSATE SODIUM 1 TABLET: 50; 8.6 TABLET ORAL at 18:01

## 2022-02-01 RX ADMIN — FLUTICASONE FUROATE AND VILANTEROL TRIFENATATE 1 PUFF: 200; 25 POWDER RESPIRATORY (INHALATION) at 08:32

## 2022-02-01 RX ADMIN — LEVALBUTEROL HYDROCHLORIDE 1.25 MG: 1.25 SOLUTION, CONCENTRATE RESPIRATORY (INHALATION) at 20:16

## 2022-02-01 RX ADMIN — Medication 2 TABLET: at 23:21

## 2022-02-01 RX ADMIN — ACETAMINOPHEN 975 MG: 325 TABLET, FILM COATED ORAL at 23:21

## 2022-02-01 RX ADMIN — DULOXETINE HYDROCHLORIDE 60 MG: 60 CAPSULE, DELAYED RELEASE ORAL at 08:29

## 2022-02-01 RX ADMIN — FELODIPINE 5 MG: 2.5 TABLET, FILM COATED, EXTENDED RELEASE ORAL at 08:30

## 2022-02-01 RX ADMIN — OXYCODONE HYDROCHLORIDE AND ACETAMINOPHEN 500 MG: 500 TABLET ORAL at 08:30

## 2022-02-01 RX ADMIN — PRAVASTATIN SODIUM 40 MG: 40 TABLET ORAL at 18:01

## 2022-02-01 RX ADMIN — ACETAMINOPHEN 975 MG: 325 TABLET, FILM COATED ORAL at 14:36

## 2022-02-01 RX ADMIN — Medication 500 MG: at 08:30

## 2022-02-01 RX ADMIN — LEVALBUTEROL HYDROCHLORIDE 1.25 MG: 1.25 SOLUTION, CONCENTRATE RESPIRATORY (INHALATION) at 07:17

## 2022-02-01 NOTE — PHYSICAL THERAPY NOTE
PHYSICAL THERAPY EVALUATION NOTE    Patient Name: Duke Cook  IANKD'Y Date: 2022     AGE:   80 y o  Mrn:   [id number]  ADMIT DX:  Acute on chronic diastolic heart failure ([facility]) [I50 33]  Hip injury [S79 919A]  Dyspnea on exertion [R06 00]  Intertrochanteric fracture ([facility]) [S72 143A]  Contusion of right elbow, initial encounter [S50 01XA]  Abrasion of right elbow, initial encounter [S50 311A]  Coronary artery disease involving native coronary artery of native heart without angina pectoris [I25 10]  Closed fracture of right femur, unspecified fracture morphology, initial encounter ([place] [address] ) [S72 91XA]  Stage 3a chronic kidney disease ([facility] ) [N18 31]    Past Medical History:   Diagnosis Date    Arthritis     Atrial flutter ([facility])     Chronic kidney disease     stage 3    Coronary artery disease     2 stents    Fluid retention     Gout     Heart failure ([facility] )     pacemaker    Hypertension     Pacemaker     Pulmonary emphysema ([facility] )     Radiculopathy     last assessed 16     Shortness of breath     exertion    Sleep apnea     c pap     Length Of Stay: 3  PHYSICAL THERAPY EVALUATION :   Patient's identity confirmed via 2 patient identifiers (full name and ) at start of session       22 0837   PT Last Visit   PT Visit Date 22   Note Type   Note type Evaluation   Pain Assessment   Pain Assessment Tool 0-10   Pain Score No Pain  (at rest, 9/10 w/ WB)   Pain Location/Orientation Orientation: Right   Restrictions/Precautions   Weight Bearing Precautions Per Order Yes   RLE Weight Bearing Per Order WBAT   Other Precautions Bed Alarm;WBS;Fall Risk;O2;Pain  (5L NC O2)   Home Living   Type of [address] [place] [address] Two level; Able to live on main level with bedroom/bathroom;Stairs to enter without rails  (2 NATHEN, pt has FFSU)   Bathroom Shower/Tub Walk-in shower   Bathroom Toilet Raised   Bathroom Equipment [address]; Wheelchair-manual   Additional Comments Pt denies using AD PTA "i refuse to", but states he uses the [facility] " all the time"   Prior Function   Level of [place] Independent with ADLs and functional mobility   Lives With Family  (wife and 2 dtrs)   Receives Help From   [address] in the last 6 months 5 to 10  (Pt reports 5)   Vocational Retired   Comments Pt reports mostly propeling self around in [facility] w/ his feet, does stand periodically throughout the day for 5-7 min, I w/ transferring self in/out of WC   General   Additional Pertinent History Pt is POD1 s/p R IM nail   Family/Caregiver Present No   Cognition   Arousal/Participation Alert   Orientation Level Oriented X4  (Pt identified by full name and )   Memory Within functional limits   Following Commands Follows multistep commands with increased time or repetition   Comments 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  Pleasant and agreeable to participate in PT evaluation ,highly motivated throughout session   Subjective   Subjective "I'll tell you what, I wouldn't want to get in a fight with you girls  You're tough"   RLE Assessment   RLE Assessment WFL   Strength RLE   RLE Overall Strength 3+/5   LLE Assessment   LLE Assessment WFL   Strength LLE   LLE Overall Strength 3+/5   Light Touch   RLE Light Touch Impaired   RLE Light Touch Comments diabetic neuropathy N+T at baseline   LLE Light Touch Impaired   LLE Light Touch Comments diabetic neuropathy N+T at baseline   Bed Mobility   Supine to Sit 2  Maximal assistance   Additional items Assist x 2; Increased time required;Verbal cues;LE management   Transfers   Sit to Stand 2  Maximal assistance   Additional items Assist x 2; Increased time required;Verbal cues   Stand to Sit 2  Maximal assistance   Additional items Assist x 2; Increased time required;Verbal cues   Additional Comments Pt performed STS from EOB w/ max A x 2, use of RW for BUE support  Pt is able to clear buttocks from ,but only tolerate about 50% of a full stand for <30s before asking to sit due to pain   Balance   Static Sitting Fair   Static Standing Zero  (Ax2)   Activity Tolerance   Activity Tolerance Patient limited by fatigue;Patient limited by pain   Medical Staff Valente coordination w/ OT Ty Zhang, Spoke to Trauma AP Aishwarya [facility] to [facility] [address] to JOSE [place]   Assessment   Prognosis Fair   Problem List Decreased strength;Decreased endurance; Impaired balance;Decreased mobility; Decreased safety awareness; Obesity;Orthopedic restrictions;Pain   Assessment Viry Carreon is a 80 y o  Male who [facility]2022 from home s/p mechanical slip and fall on ice and diagnosis of closed fracture of R femur  Pt is POD1 s/p R IM nail  Orders for PT eval and treat received, w/ activity orders of up w/ A and WBAT R LE and fall precautions  Pt presents w/ comorbidities of CPS, HTN, CHF, Afib, JOO, BPH, JOVI, COPD  At baseline, pt mobilizes independently w/ WC, able to tolerate standing for 5-7 min, and reports 4-5 falls in the last 6 months  Upon evaluation, pt presents w/ the following deficits: weakness, decreased ROM, impaired balance, decreased endurance and pain limiting functional mobility  Pt requires max A x 2 for bed mobility, max A x 2 for transfers  Pt's clinical presentation is unstable/unpredictable due to need for assist w/ all phases of mobility when usually mobilizing independently, pain impacting overall mobility status, need for supplemental oxygen in order to maintain oxygen saturation, need for input for task focus and mobility technique, recent drastic decline in mobility compared to baseline and recent history of falls  Pt is at an increased risk of falls due to physical deficits  Given the above findings, discharge recommendation is for Post-acute inpatient rehabilitation   During this admission, pt would benefit from continued skilled inpatient PT in the acute care setting in order to address the abovementioned deficits to maximize function and mobility before DC from acute care  Goals   Patient Goals to have less pain, 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   STG Expiration Date 02/11/22   Short Term Goal #1 Patient will: Increase bilateral LE strength 1/2 grade to facilitate independent mobility, Perform all bed mobility tasks w/ max A x 1 to decrease fall risk factors, Perform all transfers w/ max A x 1 to improve independence (+/- use of QuickMove, or trialing slideboard), Increase all balance 1/2 grade to decrease risk for falls, Complete exercise program independently and Tolerate 3 hr OOB to faciliate upright tolerance, PT to see for gait when appropriate   Plan   Treatment/Interventions Functional transfer training;LE strengthening/ROM; Therapeutic exercise; Endurance training;Cognitive reorientation;Patient/family training;Equipment eval/education; Bed mobility  (PT to see for gait when appropriate)   PT Frequency 4-6x/wk   Recommendation   PT Discharge Recommendation Post acute rehabilitation services   Additional Comments At this time, pt would require hoyerlift for OOB w/ RN staff, in upcoming sessions therapy w/ trial Mahala Lara (vs slideboard) as appropriate   AM-PAC Basic Mobility Inpatient   Turning in Bed Without Bedrails 1   Lying on Back to Sitting on Edge of Flat Bed 1   Moving Bed to Chair 1   Standing Up From Chair 1   Walk in Room 1   Climb 3-5 Stairs 1   Basic Mobility Inpatient Raw Score 6   Turning Head Towards Sound 4   Follow Simple Instructions 4   Low Function Basic Mobility Raw Score 14   Low Function Basic Mobility Standardized Score 22 01   Highest Level Of Mobility   JH-HLM Goal 2: Bed activities/Dependent transfer   JH-HLM Highest Level of Mobility 3: Sit at edge of bed   JH-HLM Goal Achieved Yes   Additional Treatment Session   Start Time 0853   End Time 0908   Treatment Assessment Pt seasted EOB, pt performed STS from EOB x 2 w/ height of bed elevated to assist due to pt's height  Pt is able to steadily increase standing performance each time  Pt able to achieve up to 75% of a full stand w/ max A x 2  Pt is then able to perform lateral scooting at EOB w/ min A x 1  Pt requires max A x 2 to return to laying in bed  Pt then requires mod A x 2 to reposition towards HOB while in trendelenberg   Equipment Use RW   Additional Treatment Day 1   End of Consult   Patient Position at End of Consult Supine;Bed/Chair alarm activated; All needs within reach     Pt seen as a co-eval due to the patient's co-morbidities, clinically unstable presentation, and present impairments which are a regression from the patient's baseline  The patient's AM-PAC Basic Mobility Inpatient Short Form Raw Score is 6, Standardized Score is    A standardized score less than 38 32 (raw score of 16) suggests the patient may benefit from discharge to post-acute rehabilitation services which may not coincide with above PT recommendations  However please refer to therapist recommendation for discharge planning given other factors that may influence destination      Pt would benefit from skilled inpatient PT during this admission in order to facilitate progress towards goals to maximize functional independence    Kalyani Castillo, PT, DPT

## 2022-02-01 NOTE — ASSESSMENT & PLAN NOTE
- Acute right intertrochanteric femur fracture, present on admission   - Appreciate Orthopedic surgery evaluation and recommendations s/p IM nail 1/31  - WBAT RLE  - Patient's respiratory status has improved with multiple interventions and cardiology, nephrology have evaluated the patient  - Cardiology consultation indicates patient is intermediate to high perioperative risk, however it is a necessary surgery in order for the patient to ambulate  - Consider restarting home Xarelto with stable post op hgb  - Monitor right lower extremity neurovascular exam   - Continue multimodal analgesic regimen   - Continue heparin for DVT prophylaxis  - PT and OT evaluation and treatment as indicated

## 2022-02-01 NOTE — PROGRESS NOTES
Progress Note - Andreas Moise 80 y o  male MRN: [id number]    Unit/Bed#: W -01 Encounter: [id number]      Assessment/Plan:  1  Ambulatory dysfunction SP fall  · Two recent falls, states last one was not on ice but legs gave out (both falls), h/o lumbar radiculopathy  · Consider GDR gabapentin which could be contributing? · PT OT following-recommend STR when medically clear  Fall precautions  2  Deconditioning/frailty  · Optimize diet, hydration, mobility for healing  · GFR 32- keep hydrated, avoid nephrotoxins, renally dose medications  · Reporting poor appetite-consider nutrition eval   Albumin 2 4  · hbg 7 6- cont to monitor and transfuse as needed  · Monitor SS infection, dehydration, DVT, skin breakdown  · Encourage IS, turn and reposition frequently  · Continue Oscal/D3, discussed follow-up for osteoporotic treatment/eval with PCP  3  Forgetfulness/delirium risk  · Stable at present  Continue frequent reminders, close monitoring, delirium precautions as previously written  4  Acute pain due to trauma  · S/p Right nail IM femur 1/31  · Continue scheduled Tylenol, p r n  Oxy IR  · Infrequent use Oxy IR, discussed with patient to premedicate prior to therapy to help get through sessions  · Consider adding lido patch/topical analgesia as needed for chronic pain  · Continue to monitor for constipation  · Continues on gabapentin, Cymbalta for chronic pain  · Continue nonpharmacological methods of pain control  · APS following  5  Vision/hearing impairment  · Continue supportive environment of adequate lighting, decrease noise distractions, reminders for glasses, speak clearly and toward patient when communicating  Assist as needed for ADLs  6  Constipation  · Continue senna S, prn MiraLax  Continue courage dietary fiber, fluids, mobility as able      Subjective:   Patient seen for geriatrics follow-up  He is resting comfortably in bed    States he did try to stand up and did have pain in right leg, otherwise he is doing okay  Patient denies CP/SOB/cough  Denies GI/ distress  He ate breakfast   He slept well  He is alert oriented x3  He states discharge plan is for short-term rehab when stable  Objective:     Vitals: Blood pressure (!) 114/49, pulse 69, temperature 98 1 °F (36 7 °C), resp  rate 18, height 6' 1" (1 854 m), weight 102 kg (224 lb 10 4 oz), SpO2 94 %  ,Body mass index is 29 64 kg/m²  Intake/Output Summary (Last 24 hours) at 2/1/2022 1052  Last data filed at 2/1/2022 1037  Gross per 24 hour   Intake 1902 5 ml   Output 1850 ml   Net 52 5 ml       Physical Exam:   General:  A&O x3, no distress,   Cards:  RRR, + murmur, no gallop/rub noted  Pulm:  Norm effort/no distress, cta, no w/r/r   supp o2 on  Abd:  Soft, nt, nd, +bs  MS:  Norm ROM, no focal weakness  Ext:  W/d, no edema  Unable to visualize dressing w/out turning patient       Invasive Devices  Report    Peripheral Intravenous Line            Peripheral IV 01/29/22 Right Antecubital 3 days    Peripheral IV 01/31/22 Right Hand <1 day                Lab, Imaging and other studies: I have personally reviewed pertinent reports      VTE Pharmacologic Prophylaxis: Heparin

## 2022-02-01 NOTE — OCCUPATIONAL THERAPY NOTE
Occupational Therapy Evaluation      Jas Alvarez    2/1/2022    Patient Active Problem List   Diagnosis    Chronic pain syndrome    Bilateral lumbar radiculopathy    Lumbar canal stenosis    JOO (generalized anxiety disorder)    Benign hypertension with chronic kidney disease, stage III (HCC)    Acute kidney injury superimposed on chronic kidney disease (HCC)    Benign prostatic hyperplasia    Chronic diastolic CHF (congestive heart failure) (HCC)    Allergic rhinitis    Aneurysm of abdominal aorta (HCC)    Chronic a-fib (HCC)    Carpal tunnel syndrome    Chronic gout without tophus    Centrilobular emphysema (HCC)    Deep venous insufficiency    Difficulty in walking    Fecal soiling    Fibromyalgia    Fistula-in-ano    Hyperlipidemia    Moderate or severe vision impairment, one eye    Class 2 obesity due to excess calories without serious comorbidity with body mass index (BMI) of 36 0 to 36 9 in adult    Pacemaker    JOVI (obstructive sleep apnea)    Urinary incontinence    Venous insufficiency (chronic) (peripheral)    Peripheral arteriosclerosis (HCC)    Lumbar radiculopathy    Hyponatremia    Serum total bilirubin elevated    Stage 3a chronic kidney disease ([address] )    CAD (coronary artery disease)    Status post primary angioplasty with coronary stent    Mixed simple and mucopurulent chronic bronchitis ([facility]    Hyperuricemia    Bilateral leg edema    Nephrolithiasis    Former smoker    Anemia    Vitamin D insufficiency    Chronic constipation    Medicare annual wellness visit, subsequent    Idiopathic hematuria    Kidney stone on left side    Flank pain    Obesity (BMI 30-39  9)    Dyspnea on exertion    Neuropathy    Coronary artery arteriosclerosis    Essential hypertension    Memory difficulty    COPD (chronic obstructive pulmonary disease) ([facility]    Acute on chronic diastolic heart failure ([facility])    Skin tear of right upper arm without complication    Contusion, buttock, initial encounter    Wheezing    Fall    Closed fracture of right femur [facility])       Past Medical History:   Diagnosis Date    Arthritis     [facility] fl[facility]     Chronic kidney disease     stage 3    Coronary artery disease     2 stents    Fluid retention     Gout     Heart failure ([facility] )     pacemaker    Hypertension     Pacemaker     Pulmonary emphysema ([facility] )     Radiculopathy     last assessed 1/28/16     Shortness of breath     exertion    Sleep apnea     c pap       Past Surgical History:   Procedure Laterality Date    ANGIOPLASTY      x2 2 stents and then replaced    CARDIAC PACEMAKER PLACEMENT      pacemaker permanent placement dual chamber / last assessed 4/7/14 / implantation     CARDIAC SURGERY      pacemaker    CHOLECYSTECTOMY      CORONARY ANGIOPLASTY WITH STENT PLACEMENT      EPIDURAL BLOCK INJECTION N/A 5/26/2016    Procedure: BLOCK / INJECTION EPIDURAL STEROID LUMBAR  L4-5;  Surgeon: Carlton Reeves MD;  Location: [place] [address] MAIN OR;  Service:     EPIDURAL BLOCK INJECTION N/A 2/14/2019    Procedure: L4 L5 Lumbar Epidural Steroid Injection;  Surgeon: Carlton Reeves MD;  Location: [place] [id number] MAIN OR;  Service: Pain Management     EYE SURGERY      cataract left    KNEE ARTHROSCOPY W/ MENISCAL REPAIR Left     LUMBAR EPIDURAL INJECTION N/A 3/17/2016    Procedure: BLOCK / INJECTION LUMBAR  L4-5  (C-ARM); Surgeon: Carlton Reeves MD;  Location: [facility] MAIN OR;  Service:         02/01/22 0830   OT Last Visit   OT Visit Date 02/01/22   Note Type   Note type Evaluation   Restrictions/Precautions   Weight Bearing Precautions Per Order Yes   RLE Weight Bearing Per Order WBAT   Other Precautions Bed Alarm;WBS;Fall Risk;Pain  (5L O2 via NC, no at baseline   baseline CPAP night use)   Pain Assessment   Pain Assessment Tool 0-10   Pain Score 9  (0/10 at rest, 9/10 WBing )   Pain Location/Orientation Orientation: Right;Other (Comment)  (thigh )   Home Living   Type of Home House Home Layout Two level; Able to live on main level with bedroom/bathroom;Stairs to enter without rails  (2 NATHEN, + 2 steps inside, first floor set up )   Bathroom Shower/Tub Walk-in shower   Bathroom Toilet Raised   Bathroom Equipment Built-in shower seat   [address] [place]   Additional Comments Pt denies using AD PTA "i refuse to", but states he uses the [facility] " all the time"   Prior Function   Level of [place] Independent with ADLs and functional mobility   Lives With Family  (wife and 2 dtrs)   Receives Help From Family   ADL Assistance Independent   IADLs   (family completes laundry, grocery shopping )   Falls in the last 6 months 5 to 10  (Pt reports 5)   Vocational Retired   Comments (+) driving  pt reports at baseline, able to stand for 5-7 min at time before back pain increases  Pt reports use of w/c for sitting in, self propels with feet throughout home  pt reports leaving house 2x week to go to [facility] with friends, but primarily stays at home  Pt completes home mgmt tasks, reason for admission was fall on ice while snow blowing    Lifestyle   Autonomy Pt reports being (I) with ADLs and needs assistance with IADLs  Pt lives with wife and 2 daughters in a 2 story home with 2 NATHEN first floor set up  Pt uses w/c at baseline to rest, does drive  Reciprocal Relationships wife, daughters    Service to Others retired   Intrinsic Gratification enjoys working on his trucks out in the garage  ADL   Eating Assistance 7  Independent   Grooming Assistance 7  Independent   UB Bathing Assistance 5  Supervision/Setup   LB Bathing Assistance 5  Supervision/Setup   UB Dressing Assistance 5  Supervision/Setup   LB Dressing Assistance 2  Maximal Assistance   Toileting Assistance  2  Maximal Assistance   Functional Assistance 5  Supervision/Setup   Additional Comments Pt donned B socks at EOB   Pt limited by decreased ability to achieve static standing position, impacting independence with LB ADLs    Bed Mobility   Supine to Sit 2  Maximal assistance   Additional items Assist x 2; Increased time required;Verbal cues;LE management   Additional Comments Denied lightheaded/dizziness c positional changes    Transfers   Sit to Stand 2  Maximal assistance   Additional items Assist x 2; Increased time required;Verbal cues   Stand to Sit 2  Maximal assistance   Additional items Assist x 2; Increased time required;Verbal cues   Additional Comments Sit <>stand x3 trials from EOB, Max A x2 and RW used  Pt able to clear buttocks off bed, but only able to achieve ~50% upright position for <30s before needing to return to sitting  Functional Mobility   Functional Mobility   (Unable to assist at this time safely )   Balance   Static Sitting Fair   Dynamic Sitting Fair   Static Standing Zero  (Ax2 )   Activity Tolerance   Activity Tolerance Patient limited by fatigue;Patient limited by pain   Medical Staff Made Aware Pt benefited from co-evaluation of skilled OT and PT therapists in order to most appropriately address functional deficits d/t extensive assistance required for safe functional mobility, decreased activity tolerance, and regression from functioning level prior to admission and/or onset of present illness  OT/PT objectives were addressed separately; please see PT note for specific goal areas targeted  communicated with CHANA Rojas    Nurse Made Aware JOSE Haji present in room upon arrival, verbalized pt appropriate to see and notified of outcomes    RUE Assessment   RUE Assessment WFL  (Full AROM, MMT 4/5)   LUE Assessment   LUE Assessment WFL  (Full AROM, MMT 4/5)   Hand Function   Gross Motor Coordination Functional   Fine Motor Coordination Functional   Sensation   Light Touch Severe deficits in the RLE; Severe deficits in the LLE  (Denies UE deficits, severe B feet/ankle deficts at baseline )   Vision-Basic Assessment   Current Vision Wears glasses all the time  (bifocals )   Visual History   (cateracts per chart review )   Cognition   Overall Cognitive Status [facility]   Arousal/Participation Alert; Cooperative  (engaged and motivated )   Attention Within functional limits   Orientation Level Oriented X4  (Pt identified by full name and )   Memory Within functional limits   Following Commands Follows multistep commands with increased time or repetition   Comments Pt agreeable to OT session, pleasant    Assessment   Limitation Decreased ADL status; Decreased Safe judgement during ADL;Decreased sensation;Decreased high-level ADLs; Decreased self-care trans   Prognosis Good   Assessment Patient is a 80 y o  male seen for OT evaluation at [facility] following admission on 2022  s/p Closed fracture of right femur ([address] )  Comorbidities impacting functional performance include: chronic pain syndrome, HTN, [place], CHF, a-fib, lumbar radiculopathy, stage 3a CKD, CAD, Hx of falls,   Patient presents with active orders for OT eval and treat and Up with assistance   Performed at least 2 patient identifiers during session including name and wristband  Pt reports being (I) with ADLs and needs assistance with IADLs  Pt lives with wife and 2 daughters in a 2 story home with 2 NATHEN first floor set up  Pt uses w/c at baseline to rest, does drive  Upon initial evaluation, patient requires supervision for UB ADLs, max assist for LB ADLs, and max assist x2 for transfers and bed mobility  Sit<>stand transfers trialed x3 today, unable to achieve upright standing position, will continue to further assess  Based on functional eval, patient presents A&Ox4 with intact  attention, impaired  safety awareness, and intact  short term and long term memory   Occupational performance is affected by the following deficits: decreased standing tolerance for self care tasks , decreased dynamic balance impacting functional reach, decreased activity tolerance , impaired safety awareness  and (+) pain  Based on these findings, functional performance deficits, and assessment findings, pt has been identified as a high complexity evaluation  Personal factors impacting performance at the time of evaluation include: decreased (+) Hx of falls , steps to enter home, decreased IADL independence and High fall risk   Patient would benefit from OT services to maximize level of functional independence and safety in self-care and transfers  Occupational areas to address include:bathing/showering, toileting, dressing , bed mobility , functional mobility, transfer to all surfaces, meal preparation, household management and fall prevention   From OT standpoint, recommendation at time of D/C would be post-acute rehabilitation   Goals   Patient Goals to have less pain, 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   Plan   Treatment Interventions ADL retraining;Functional transfer training;Patient/family training;Equipment evaluation/education; Neuromuscular reeducation   Goal Expiration Date 02/11/22   OT Treatment Day 0   OT Frequency 3-5x/wk   Recommendation   OT Discharge Recommendation Post acute rehabilitation services   OT - OK to Discharge Yes  (once medically clear)   Additional Comments  Pt resting in bed at end of session, all needs met and call button in reach  Bed alarm activated    Additional Comments 2 The patient's raw score on the AM-PAC Daily Activity inpatient short form is 16, standardized score is 35 96, less than 39 4  Patients at this level are likely to benefit from discharge to post-acute rehabilitation services  Please refer to the recommendation of the Occupational Therapist for safe discharge planning     AM-PAC Daily Activity Inpatient   Lower Body Dressing 1   Bathing 2   Toileting 1   Upper Body Dressing 4   Grooming 4   Eating 4   Daily Activity Raw Score 16   Daily Activity Standardized Score (Calc for Raw Score >=11) 35 96   AM-[facility] Applied Cognition Inpatient   Following a Speech/Presentation 4   Understanding Ordinary Conversation 4   Taking Medications 3   Remembering Where Things Are Placed or Put Away 3   Remembering List of 4-5 Errands 3   Taking Care of Complicated Tasks 3   Applied Cognition Raw Score 20   Applied Cognition Standardized Score 41 76     Pt will complete UB ADLs Independent  with use of AE as needed for increased ADL independence within 10 days  Pt will complete LB ADLs Independent   with use of AE as needed for increased ADL independence within 10 days  Pt will complete toileting Mod independent   with use of DME for increased ADL independence within 10 days  Pt will demonstrate proper body mechanics to complete transfers and functional mobility with Min A and use of AD for increased safety and functional mobility within 10 days  Pt will demonstrate standing tolerance of 5 min with Min A and use of AD for increased endurance and activity tolerance within 10 days  Pt will demonstrate OOB sitting tolerance of 2-4 hr/day for increased activity tolerance and engagement within 10 days  Pt will participate in 10 min of BUE therapeutic exercise to increase strength, ROM, and endurance during ADL/IADLs within 10 days  Pt will demonstrate proper body mechanics and fall prevention strategies during 100% of tx sessions for increased safety awareness during ADL/IADLs    Pt will verbalize and demonstrate understanding of energy conservation/deep breathing techniques for increased activity tolerance and endurance during meaningful activities       Fayette Claude, OT

## 2022-02-01 NOTE — CASE MANAGEMENT
Case Management Assessment & Discharge Planning Note    Patient name Maral Witt  Location W /W -17 MRN [id number]  : 1940 Date 2022       Current Admission Date: 2022  Current Admission Diagnosis:Closed fracture of right femur [facility])   Patient Active Problem List    Diagnosis Date Noted    Fall 2022    Cl[facility] fracture of right femur ([facility] ) 2022    Wheezing 2021    Skin tear of right upper arm without complication     Contusion, buttock, initial encounter 2021    COPD (chronic obstructive pulmonary disease) ([facility] ) 2021    Acute on chronic diastolic heart failure ([facility] ) 2021    Memory difficulty 2021    Neuropathy 2021    Coronary artery arteriosclerosis 2021    Essential hypertension 2021    Dyspnea on exertion     Idiopathic hematuria 2021    Kidney stone on left side 2021    Flank pain 2021    Obesity (BMI 30-39 9) 2021    Chronic constipation 2021    Medicare annual wellness visit, subsequent 2021    Vitamin D insufficiency 2020    Anemia 2020    Former smoker 2020    Nephrolithiasis 10/30/2019    Bilateral leg edema 2019    Hyperuricemia 2019    Mixed simple and mucopurulent chronic bronchitis ([facility] ) 2019    CAD (coronary artery disease) 2019    Status post primary angioplasty with coronary stent 2019    Stage 3a chronic kidney disease ([facility] ) 2019    Hyponatremia 2019    Serum total bilirubin elevated 2019    Peripheral arteriosclerosis ([facility] ) 2019    Lumbar radiculopathy 2019    Benign hypertension with chronic kidney disease, stage III ([facility] ) 2017    Acute kidney injury superimposed on chronic kidney disease ([facility] ) 2017    JOO (generalized anxiety disorder) 2016    Chronic pain syndrome 2016    Bilateral lumbar radiculopathy 2016    Lumbar canal stenosis 03/17/2016    Difficulty in walking 09/11/2015    Urinary incontinence 10/30/2014    Aneurysm of abdominal aorta (HCC) 04/07/2014    Centrilobular emphysema (HCC) 04/07/2014    Deep venous insufficiency 04/07/2014    Hyperlipidemia 04/07/2014    Pacemaker 04/07/2014    Chronic diastolic CHF (congestive heart failure) ([facility] ) 04/06/2014    Fibromyalgia 08/08/2013    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/2013    Fecal soiling 03/26/2013    Class 2 obesity due to excess calories without serious comorbidity with body mass index (BMI) of 36 0 to 36 9 in adult 03/26/2013    Chronic a-fib ([facility] ) 01/23/2013    Allergic rhinitis 10/01/2012    Fistula-in-ano 09/05/2012    Benign prostatic hyperplasia 09/04/2012    Carpal tunnel syndrome 09/04/2012    Moderate or severe vision impairment, one eye 09/04/2012    JOVI (obstructive sleep apnea) 09/04/2012    Venous insufficiency (chronic) (peripheral) 09/04/2012      LOS (days): 3  Geometric Mean LOS (GMLOS) (days): 3 80  Days to GMLOS:0 8     OBJECTIVE:    Risk of Unplanned Readmission Score: 26         Current admission status: Inpatient       Preferred Pharmacy:   [place] [address] [address] [facility]  [address] [facility],  [address] [address]  Phone: [phone] Fax: [phone] [address] [place] [address] [address] [address]  Phone: [phone] Fax: [phone]    [facility] [address]  Phone: [phone] Fax: [phone]    Primary Care Provider: Teresa Funes DO    Primary Insurance: MEDICARE  Secondary Insurance: COMMERCIAL MISCELLANEOUS    ASSESSMENT:  [address] - Daughter   Primary Phone: [phone] (Mobile)                              Patient Information  Admitted from[de-identified] Home  Mental Status: Alert  During Assessment patient was accompanied by: Not accompanied during assessment  Assessment information provided by[de-identified] Patient  Primary Caregiver: Self  Support Systems: Spouse/significant [address] Corner of Residence: [address] [place] [address] do you live in?: Mandy Morales 45 entry access options   Select all that apply : Stairs  Number of steps to enter home : 2  Do the steps have railings?: No  Type of Current Residence: 2 story home  Upon entering residence, is there a bedroom on the main floor (no further steps)?: No  A bedroom is located on the following floor levels of residence (select all that apply):: 2nd Floor (two steps up to 1st floor with everything)  Upon entering residence, is there a bathroom on the main floor (no further steps)?: No (two steps up to 1st floor with everything)  Indicate which floors of current residence have a bathroom (select all the apply):: 2nd Floor  Number of steps to 2nd floor from main floor: 2  In the last 12 months, was there a time when you were not able to pay the mortgage or rent on time?: No  In the last 12 months, was there a time when you did not have a steady place to sleep or slept in a shelter (including now)?: No  Homeless/housing insecurity resource given?: N/A  Living Arrangements: Lives w/ Spouse/significant other,Lives w/ Extended Family    Activities of Daily Living Prior to Admission  Functional Status: Independent  Completes ADLs independently?: Yes  Ambulates independently?: Yes  Does patient use assisted devices?: No  Does patient currently own DME?: Yes  What DME does the patient currently own?: Alex Lucia  Does patient have a history of Outpatient Therapy (PT/OT)?: Yes  Does the patient have a history of Short-Term Rehab?: Yes [facility])  Does patient have a history of [facility]?: Yes (Pt cannot remember)  Does patient currently have [facility]?: No         Patient Information Continued  Income Source: Employed  Does patient have prescription coverage?: Yes  Within the past 12 months, you worried that your food would run out before you got the money to buy more : Never true  Within the past 12 months, the food you bought just didnt last and you didnt have money to get more : Never true  Food insecurity resource given?: N/A  Does patient receive dialysis treatments?: No  Does patient have a history of substance abuse?: No  Does patient have a history of Mental Health Diagnosis?: No         Means of Transportation  Means of Transport to Appts[de-identified] Drives Self  In the past 12 months, has lack of transportation kept you from medical appointments or from getting medications?: No  In the past 12 months, has lack of transportation kept you from meetings, work, or from getting things needed for daily living?: No  Was application for public transport provided?: N/A        DISCHARGE DETAILS:    Discharge planning discussed with[de-identified] Pt  Freedom of Choice: Yes  Comments - Freedom of Choice: Pt asked to be referred to [address], with a blanket referral if [facility] is unable to [facility], per daughter's wishes  They feel this facility will be optimal for pt  CM placed referral for [address] [place] [address]  CM contacted family/caregiver?: Yes  Were Treatment Team discharge recommendations reviewed with patient/caregiver?: Yes  Did patient/caregiver verbalize understanding of patient care needs?: Yes  Were patient/caregiver advised of the risks associated with not following Treatment Team discharge recommendations?: Yes    Contacts  Patient Contacts: Daughter and Wife  Relationship to Patient[de-identified] Family  Contact Method: In Person  Reason/Outcome: Continuity of Care,Referral,Discharge Planning              Other Referral/Resources/Interventions Provided:  Interventions: Short Term Rehab  Referral Comments: Pt wishes to go to [address] as first choice, blanket referral if facility has no bed availability at this time      Would you like to participate in our [address] service program?  : No - Declined    Treatment Team Recommendation: Short Term Rehab  Discharge Destination Plan[de-identified] Short Term Rehab  Transport at Discharge : Family                                                         Pt is COVID vaccinated and boosted  Pt prefers to use whichever pharmacy is less expensive  CM placed referrals

## 2022-02-01 NOTE — PLAN OF CARE
Problem: PHYSICAL THERAPY ADULT  Goal: Performs mobility at highest level of function for planned discharge setting  See evaluation for individualized goals  Description: Treatment/Interventions: Functional transfer training,LE strengthening/ROM,Therapeutic exercise,Endurance training,Cognitive reorientation,Patient/family training,Equipment eval/education,Bed mobility (PT to see for gait when appropriate)          See flowsheet documentation for full assessment, interventions and recommendations  2/1/2022 0951 by Malena Franco PT  Note: Prognosis: Fair  Problem List: Decreased strength,Decreased endurance,Impaired balance,Decreased mobility,Decreased safety awareness,Obesity,Orthopedic restrictions,Pain  Assessment: Deya Raza is a 80 y o  Male who [facility] 1[facility]29/2022 from home s/p mechanical slip and fall on ice and diagnosis of closed fracture of R femur  Pt is POD1 s/p R IM nail  Orders for PT eval and treat received, w/ activity orders of up w/ A and WBAT R LE and fall precautions  Pt presents w/ comorbidities of CPS, HTN, CHF, Afib, JOO, BPH, JOVI, COPD  At baseline, pt mobilizes independently w/ WC, able to tolerate standing for 5-7 min, and reports 4-5 falls in the last 6 months  Upon evaluation, pt presents w/ the following deficits: weakness, decreased ROM, impaired balance, decreased endurance and pain limiting functional mobility  Pt requires max A x 2 for bed mobility, max A x 2 for transfers  Pt's clinical presentation is unstable/unpredictable due to need for assist w/ all phases of mobility when usually mobilizing independently, pain impacting overall mobility status, need for supplemental oxygen in order to maintain oxygen saturation, need for input for task focus and mobility technique, recent drastic decline in mobility compared to baseline and recent history of falls  Pt is at an increased risk of falls due to physical deficits   Given the above findings, discharge recommendation is for Post-acute inpatient rehabilitation  During this admission, pt would benefit from continued skilled inpatient PT in the acute care setting in order to address the abovementioned deficits to maximize function and mobility before DC from acute care  PT Discharge Recommendation: Post acute rehabilitation services          See flowsheet documentation for full assessment

## 2022-02-01 NOTE — PROGRESS NOTES
[facility] PROGRESS NOTE   Magdiel Palma 80 y o  male MRN: [id number]  Unit/Bed#: W -01 Encounter: [id number]  Reason for Consult: SHAWNEE on CKD    Summary:  19-year-old male with a history of CKD, CAD, hypertension, nephrolithiasis, diastolic CHF, valvular heart disease, gout, smoking history who was admitted to S  after falling and sustaining fracture of the proximal right femoral   nephrology following for management of acute kidney injury on CKD  ASSESSMENT and PLAN:  Acute kidney injury:  · Baseline creatinine 1 3-1 6 mg/dL  Creatinine 1 7 on admission increasing and peaking at 2 16 on 01/31  · Etiology:  Felt to be likely related to soft blood pressures/relative hypotension/diuresis  · Management:  1/31 given IV fluids preoperatively  Patient successfully underwent repair of right proximal femoral fracture  2/1 creatinine down to 1 87  Adequate urine output  · Plan:  No further IV fluids indicated  Will likely need to restart diuretic in the next 24-48 hours     Chronic kidney disease, stage III:  · Follows with Dr Dilan Cheatham    Right proximal femoral fracture  · Status post surgical repair on 01/31/2022    CHF with valvular disease:  · Lasix held on 01/31, given IV fluids  · Echocardiogram: Left Ventricle: Left ventricular cavity size is normal  The left ventricular ejection fraction is 65%  Systolic function is normal  Wall motion is normal  Wall thickness is moderately increased  There is moderate concentric hypertrophy  Calcified aortic valve with severe stenosis noted, moderate mitral valve stenosis, mild TR  · Cardiology following    Anemia:  · Postoperative hemoglobin down to 7 6    Electrolytes:  · Potassium acceptable, 4 2    Other:  · Ninety pack-year smoking history      SUBJECTIVE / 24H INTERVAL HISTORY:  No acute complaints  Robust appetite eating very well  No overnight events reported  Denies shortness of breath or chest pain      OBJECTIVE:  Current Weight: Weight - Scale: 102 kg (224 lb 10 4 oz)  Vitals:    02/01/22 0250 02/01/22 0600 02/01/22 0729 02/01/22 0744   BP: 130/63   (!) 114/49   BP Location:       Pulse:    69   Resp: 18      Temp:    98 1 °F (36 7 °C)   TempSrc:       SpO2:   94% 95%   Weight:  102 kg (224 lb 10 4 oz)     Height:           Intake/Output Summary (Last 24 hours) at 2/1/2022 4932  Last data filed at 2/1/2022 1619  Gross per 24 hour   Intake 1482 5 ml   Output 1850 ml   Net -367 5 ml     General: NAD, comfortably lying in bed eating breakfast  Skin: no rash, warm and  Eyes: anicteric sclera  ENT: moist mucous membrane, oropharynx clear  Neck: supple, no JVD noted  Chest:   wheezes right greater than left, no rales or rhonchi appreciated  On nasal cannula oxygen, normal effort  CVS: E8W6, systolic murmur, no gallop, no rub    Normal rate regular rhythm  Abdomen: soft, nontender, nl sounds, protuberant  Extremities:  Mild edema LE b/l  : no denney  Neuro: AAOX3  Psych: normal affect  Medications:    Current Facility-Administered Medications:     acetaminophen (TYLENOL) tablet 975 mg, 975 mg, Oral, Q8H [facility], Shauna Cooper MD, 975 mg at 01/31/22 2122    albuterol (PROVENTIL HFA,VENTOLIN HFA) inhaler 2 puff, 2 puff, Inhalation, Q6H PRN, Shauna Cooper MD, 2 puff at 01/30/22 1136    allopurinol (ZYLOPRIM) tablet 200 mg, 200 mg, Oral, Daily, Shauna Cooper MD, 200 mg at 01/31/22 3813    ascorbic acid (VITAMIN C) tablet 500 mg, 500 mg, Oral, Daily, Shauna Cooper MD, 500 mg at 01/31/22 0911    calcium carbonate-vitamin D (OSCAL-D) 500 mg-200 units per tablet 2 tablet, 2 tablet, Oral, HS, Shauna Cooper MD, 2 tablet at 01/31/22 2121    cholecalciferol (VITAMIN D3) tablet 1,000 Units, 1,000 Units, Oral, Daily, Shauna Cooper MD, 1,000 Units at 01/31/22 0910    cyanocobalamin (VITAMIN B-12) tablet 100 mcg, 100 mcg, Oral, Daily, Shauna Cooper MD, 100 mcg at 01/31/22 0910    DULoxetine (CYMBALTA) delayed release capsule 60 mg, 60 mg, Oral, Daily, Jordan Castelan MD, 60 mg at 01/31/22 0910    felodipine (PLENDIL) 24 hr tablet 5 mg, 5 mg, Oral, Daily, Jordan Castelan MD, 5 mg at 01/30/22 1132    finasteride (PROSCAR) tablet 5 mg, 5 mg, Oral, Daily, Jordan Castelan MD, 5 mg at 01/31/22 0911    fluticasone-vilanterol (BREO ELLIPTA) 200-25 MCG/INH inhaler 1 puff, 1 puff, Inhalation, Daily, Jordan Castelan MD, 1 puff at 01/31/22 0912    gabapentin (NEURONTIN) capsule 300 mg, 300 mg, Oral, BID, Jordan Castelan MD, 300 mg at 01/31/22 1700    glucosamine sulfate capsule 500 mg, 500 mg, Oral, BID, Jordan Castelan MD, 500 mg at 01/31/22 1700    heparin (porcine) subcutaneous injection 5,000 Units, 5,000 Units, Subcutaneous, Q8H [address], 5,000 Units at 02/01/22 0500 **AND** Platelet count, , , Once, Jordan Castelan MD    HYDROmorphone (DILAUDID) injection 0 2 mg, 0 2 mg, Intravenous, Q4H PRN, Jordan Castelan MD    levalbuterol Robert ) inhalation solution 0 63 mg, 0 63 mg, Nebulization, Q8H PRN, Jordan Castelan MD, 0 63 mg at 01/30/22 0787    levalbuterol Robert ) inhalation solution 1 25 mg, 1 25 mg, Nebulization, BID, Jordan Castelan MD, 1 25 mg at 02/01/22 0717    naloxone (NARCAN) 0 04 mg/mL syringe 0 04 mg, 0 04 mg, Intravenous, Q1MIN PRN, Jordan Castelan MD    oxyCODONE (ROXICODONE) IR tablet 2 5 mg, 2 5 mg, Oral, Q4H PRN, Jordan Castelan MD    oxyCODONE (ROXICODONE) IR tablet 5 mg, 5 mg, Oral, Q4H PRN, Jordan Castelan MD, 5 mg at 01/30/22 0541    polyethylene glycol (MIRALAX) packet 17 g, 17 g, Oral, Daily PRN, Jordan Castelan MD    pravastatin (PRAVACHOL) tablet 40 mg, 40 mg, Oral, Daily With Bob Newberry MD, 40 mg at 01/31/22 1649    senna-docusate sodium (SENOKOT S) 8 6-50 mg per tablet 1 tablet, 1 tablet, Oral, BID, Jordan Castelan MD, 1 tablet at 01/31/22 1700    sodium chloride 0 9 % inhalation solution 3 mL, 3 mL, Nebulization, BID, Jordan Castelan MD, 3 mL at 02/01/22 0717    Laboratory Results:  Results from last 7 days   Lab Units 02/01/22  0456 01/31/22  2247 01/31/22  2241 01/31/22  0510 01/30/22  0537 01/29/22  1016   WBC Thousand/uL 9 65  --   --  11 24* 13 15* 9 20   HEMOGLOBIN g/dL 7 6*  --   --  8 1* 8 9* 9 9*   HEMATOCRIT % 25 9*  --   --  27 1* 30 6* 33 7*   PLATELETS Thousands/uL 190 204  --  226 237 238   POTASSIUM mmol/L 4 2  --  4 3 3 9 4 2 3 8   CHLORIDE mmol/L 98*  --  97* 101 101 100   CO2 mmol/L 26  --  28 26 26 28   BUN mg/dL 52*  --  50* 49* 41* 38*   CREATININE mg/dL 1 87*  --  2 04* 2 16* 1 89* 1 70*   CALCIUM mg/dL 8 7  --  8 8 8 8 9 1 9 3   MAGNESIUM mg/dL  --   --   --  2 3  --   --

## 2022-02-01 NOTE — PROGRESS NOTES
Progress Note - Cardiology   Andreas Moise 80 y o  male MRN: [id number]  Unit/Bed#: W -01 Encounter: [id number]        Principal Problem:    Closed fracture of right femur ([address] )  Active Problems:    Chronic pain syndrome    Benign hypertension with chronic kidney disease, stage III (HCC)    Acute kidney injury superimposed on chronic kidney disease (HCC)    Chronic diastolic CHF (congestive heart failure) (HCC)    Chronic a-fib (HCC)    Lumbar radiculopathy    Hyponatremia    Stage 3a chronic kidney disease (HCC)    CAD (coronary artery disease)    Fall        Assessment/Plan    1  Acute right intertrochanteric femur fracture   Status post IM nail 1/31  Cardiology consulted preoperative-intermediate to high risk CAD/valvular heart disease  POD #1    2  Severe aortic stenosis  Current TTE- MG 41mmHG  FLY 0 91cm2  Mild-to-moderate AI  Normal LVEF  TTE 11/2021- RVSP 57mmHg  F/u with Dr Kenia Denny  3  Chronic diastolic failure  Reports dry weight 280 LBS in takes Lasix 40 mg in the morning 20 in the p m  Current weight 224 lbs ?? Hypoxic/ volume overload  1/30-Received Lasix 80 mg IV , creat bump  Med rec indicates he takes Aldactone but the patient does not recall this    4  PPM- [employer]  Dual chamber  Battery life 6 months  F/U with Dr Kenia Denny  5  Chronic atrial fibrillation  AC- xarelto - on hold  Patient with ambulatory gait dysfunction/weakness/falls  Would recommend ongoing discussion regarding risks and benefits as well as ongoing evaluation of ambulation  Patient falling asleep during my visit  6  Coronary artery disease with history of PCI 5/2019 - LEOBARDO prox RCA/mid LAD  Would start aspirin 81 mg  No need to resume Plavix as PCI greater than 1 year  7  Moderate MR  MG 6mmHg    8  HTN-normotensive today  Plendil 5 mg daily    9  SHAWNEE/CKD III  Baseline creatinine 1 3-1 5  Creatinine 1 7 which trended up to 2 1 with diuretics  Creatinine today 1 8    Diuretics on hold to assure improvement of creatinine        Subjective/Objective   Chief Complaint/Subjective  Patient without complaints of chest pain or shortness of breath  Falling asleep during my exam and questions           Vitals: BP (!) 114/49   Pulse 69   Temp 98 1 °F (36 7 °C)   Resp 18   Ht 6' 1" (1 854 m)   Wt 102 kg (224 lb 10 4 oz)   SpO2 94%   BMI 29 64 kg/m²     Vitals:    01/31/22 0724 02/01/22 0600   Weight: 101 kg (223 lb) 102 kg (224 lb 10 4 oz)     Orthostatic Blood Pressures      Most Recent Value   Blood Pressure 114/49 filed at 02/01/2022 8783   Patient Position - Orthostatic VS Lying filed at 01/31/2022 2202            Intake/Output Summary (Last 24 hours) at 2/1/2022 1056  Last data filed at 2/1/2022 1037  Gross per 24 hour   Intake 1902 5 ml   Output 1850 ml   Net 52 5 ml       Invasive Devices  Report    Peripheral Intravenous Line            Peripheral IV 01/29/22 Right Antecubital 3 days    Peripheral IV 01/31/22 Right Hand <1 day                Current Facility-Administered Medications   Medication Dose Route Frequency    acetaminophen (TYLENOL) tablet 975 mg  975 mg Oral Q8H [address]    albuterol (PROVENTIL HFA,VENTOLIN HFA) inhaler 2 puff  2 puff Inhalation Q6H PRN    allopurinol (ZYLOPRIM) tablet 200 mg  200 mg Oral Daily    ascorbic acid (VITAMIN C) tablet 500 mg  500 mg Oral Daily    calcium carbonate-vitamin D (OSCAL-D) 500 mg-200 units per tablet 2 tablet  2 tablet Oral HS    cholecalciferol (VITAMIN D3) tablet 1,000 Units  1,000 Units Oral Daily    cyanocobalamin (VITAMIN B-12) tablet 100 mcg  100 mcg Oral Daily    DULoxetine (CYMBALTA) delayed release capsule 60 mg  60 mg Oral Daily    felodipine (PLENDIL) 24 hr tablet 5 mg  5 mg Oral Daily    finasteride (PROSCAR) tablet 5 mg  5 mg Oral Daily    fluticasone-vilanterol (BREO ELLIPTA) 200-25 MCG/INH inhaler 1 puff  1 puff Inhalation Daily    gabapentin (NEURONTIN) capsule 300 mg  300 mg Oral BID    glucosamine sulfate capsule 500 mg  500 mg Oral BID  heparin (porcine) subcutaneous injection 5,000 Units  5,000 Units Subcutaneous Q8H [address]    HYDROmorphone (DILAUDID) injection 0 2 mg  0 2 mg Intravenous Q4H PRN    levalbuterol (XOPENEX) inhalation solution 0 63 mg  0 63 mg Nebulization Q8H PRN    levalbuterol (XOPENEX) inhalation solution 1 25 mg  1 25 mg Nebulization BID    naloxone (NARCAN) 0 04 mg/mL syringe 0 04 mg  0 04 mg Intravenous Q1MIN PRN    oxyCODONE (ROXICODONE) IR tablet 2 5 mg  2 5 mg Oral Q4H PRN    oxyCODONE (ROXICODONE) IR tablet 5 mg  5 mg Oral Q4H PRN    polyethylene glycol (MIRALAX) packet 17 g  17 g Oral Daily PRN    pravastatin (PRAVACHOL) tablet 40 mg  40 mg Oral Daily With Dinner    senna-docusate sodium (SENOKOT S) 8 6-50 mg per tablet 1 tablet  1 tablet Oral BID    sodium chloride 0 9 % inhalation solution 3 mL  3 mL Nebulization BID         Physical Exam: BP (!) 114/49   Pulse 69   Temp 98 1 °F (36 7 °C)   Resp 18   Ht 6' 1" (1 854 m)   Wt 102 kg (224 lb 10 4 oz)   SpO2 94%   BMI 29 64 kg/m²     General Appearance:    Alert, drowsy   Head:    Normocephalic, no scleral icterus   Eyes:    PERRL   Nose:   Nares normal, septum midline, no drainage    Throat:   Lips, mucosa, and tongue normal   Neck:   Supple, symmetrical, trachea midline,            Lungs:     Few crackles  to auscultation bilaterally, respirations unlabored        Heart:    Regular rate and rhythm, S1 and S2 normal, GOKUL   Abdomen:     Soft, non-tender   Extremities:   Extremities normal, atraumatic, no cyanosis , mild edema       Skin:   Skin warm   Neurologic:   Alert and oriented to person place and time                 Lab Results:   Recent Results (from the past 72 hour(s))   ABORh Recheck - Contact Blood Bank Prior to Collection    Collection Time: 01/29/22 11:05 AM   Result Value Ref Range    ABO Grouping AB     Rh Factor Positive    ECG 12 lead    Collection Time: 01/29/22 11:30 AM   Result Value Ref Range    Ventricular Rate 70 BPM    Atrial Rate 312 BPM    ID Interval  ms    QRSD Interval 168 ms    QT Interval 504 ms    QTC Interval 544 ms    P Axis  degrees    QRS Axis -77 degrees    T Wave Axis 96 degrees   Basic metabolic panel    Collection Time: 01/30/22  5:37 AM   Result Value Ref Range    Sodium 137 136 - 145 mmol/L    Potassium 4 2 3 5 - 5 3 mmol/L    Chloride 101 100 - 108 mmol/L    CO2 26 21 - 32 mmol/L    ANION GAP 10 4 - 13 mmol/L    BUN 41 (H) 5 - 25 mg/dL    Creatinine 1 89 (H) 0 60 - 1 30 mg/dL    Glucose 166 (H) 65 - 140 mg/dL    Calcium 9 1 8 3 - 10 1 mg/dL    eGFR 32 ml/min/1 73sq m   CBC (With Platelets)    Collection Time: 01/30/22  5:37 AM   Result Value Ref Range    WBC 13 15 (H) 4 31 - 10 16 Thousand/uL    RBC 3 63 (L) 3 88 - 5 62 Million/uL    Hemoglobin 8 9 (L) 12 0 - 17 0 g/dL    Hematocrit 30 6 (L) 36 5 - 49 3 %    MCV 84 82 - 98 fL    MCH 24 5 (L) 26 8 - 34 3 pg    MCHC 29 1 (L) 31 4 - 37 4 g/dL    RDW 16 7 (H) 11 6 - 15 1 %    Platelets 093 590 - 940 Thousands/uL    MPV 9 2 8 9 - 12 7 fL   Urinalysis with microscopic    Collection Time: 01/30/22  1:50 PM   Result Value Ref Range    Clarity, UA Clear     Color, UA Yellow     Specific Stov[place], UA 1 015 1 003 - 1 030    pH, UA 6 5 4 5, 5 0, 5 5, 6 0, 6 5, 7 0, 7 5, 8 0    Glucose,  (1/10%) (A) Negative mg/dl    Ketones, UA Negative Negative mg/dl    Blood, UA Large (A) Negative    Protein, UA Negative Negative mg/dl    Nitrite, UA Negative Negative    Bilirubin, UA Negative Negative    Urobilinogen, UA 0 2 0 2, 1 0 E U /dl E U /dl    Leukocytes, UA Negative Negative    WBC, UA None Seen None Seen, 2-4, 5-60 /hpf    RBC, UA 20-30 (A) None Seen, 2-4 /hpf    Hyaline Casts, UA 0-1 (A) (none) /lpf    Bacteria, UA Occasional None Seen, Occasional /hpf    Epithelial Cells Occasional None Seen, Occasional /hpf   CBC and differential    Collection Time: 01/31/22  5:10 AM   Result Value Ref Range    WBC 11 24 (H) 4 31 - 10 16 Thousand/uL    RBC 3 28 (L) 3 88 - 5 62 Million/uL Hemoglobin 8 1 (L) 12 0 - 17 0 g/dL    Hematocrit 27 1 (L) 36 5 - 49 3 %    MCV 83 82 - 98 fL    MCH 24 7 (L) 26 8 - 34 3 pg    MCHC 29 9 (L) 31 4 - 37 4 g/dL    RDW 17 0 (H) 11 6 - 15 1 %    MPV 9 5 8 9 - 12 7 fL    Platelets 192 685 - 073 Thousands/uL    nRBC 0 /100 WBCs    Neutrophils Relative 81 (H) 43 - 75 %    Immat GRANS % 1 0 - 2 %    Lymphocytes Relative 9 (L) 14 - 44 %    Monocytes Relative 7 4 - 12 %    Eosinophils Relative 1 0 - 6 %    Basophils Relative 1 0 - 1 %    Neutrophils Absolute 9 15 (H) 1 85 - 7 62 Thousands/µL    Immature Grans Absolute 0 10 0 00 - 0 20 Thousand/uL    Lymphocytes Absolute 1 06 0 60 - 4 47 Thousands/µL    Monocytes Absolute 0 75 0 17 - 1 22 Thousand/µL    Eosinophils Absolute 0 12 0 00 - 0 61 Thousand/µL    Basophils Absolute 0 06 0 00 - 0 10 Thousands/µL   Basic metabolic panel    Collection Time: 01/31/22  5:10 AM   Result Value Ref Range    Sodium 137 136 - 145 mmol/L    Potassium 3 9 3 5 - 5 3 mmol/L    Chloride 101 100 - 108 mmol/L    CO2 26 21 - 32 mmol/L    ANION GAP 10 4 - 13 mmol/L    BUN 49 (H) 5 - 25 mg/dL    Creatinine 2 16 (H) 0 60 - 1 30 mg/dL    Glucose 127 65 - 140 mg/dL    Calcium 8 8 8 3 - 10 1 mg/dL    eGFR 27 ml/min/1 73sq m   Magnesium    Collection Time: 01/31/22  5:10 AM   Result Value Ref Range    Magnesium 2 3 1 6 - 2 6 mg/dL   Echo follow up/limited w/ contrast if indicated    Collection Time: 01/31/22  8:10 AM   Result Value Ref Range    Triscuspid Valve Regurgitation Peak Gradient 54 0 mmHg    Tricuspid valve peak regurgitation velocity 3 66 m/s    LV EF 65     LVOT stroke volume 79 35 cm3    LVOT area 2 83 cm2    LVOT peak VTI 28 0 cm    LVOT peak naun 1 2 m/s    LVOT diameter 1 9 cm    Ao peak naun retrograde 4 0 m/s    Ao VTI 87 0 cm    AV mean gradient 41 0 mmHg    AV peak gradient 64 0 mmHg    AV Velocity Ratio 0 30     AV valve area 0 91 cm2   Basic metabolic panel    Collection Time: 01/31/22 10:41 PM   Result Value Ref Range    Sodium 133 (L) 136 - 145 mmol/L    Potassium 4 3 3 5 - 5 3 mmol/L    Chloride 97 (L) 100 - 108 mmol/L    CO2 28 21 - 32 mmol/L    ANION GAP 8 4 - 13 mmol/L    BUN 50 (H) 5 - 25 mg/dL    Creatinine 2 04 (H) 0 60 - 1 30 mg/dL    Glucose 166 (H) 65 - 140 mg/dL    Calcium 8 8 8 3 - 10 1 mg/dL    eGFR 29 ml/min/1 73sq m   Platelet count    Collection Time: 01/31/22 10:47 PM   Result Value Ref Range    Platelets 835 180 - 844 Thousands/uL    MPV 9 1 8 9 - 12 7 fL   Comprehensive metabolic panel    Collection Time: 02/01/22  4:56 AM   Result Value Ref Range    Sodium 134 (L) 136 - 145 mmol/L    Potassium 4 2 3 5 - 5 3 mmol/L    Chloride 98 (L) 100 - 108 mmol/L    CO2 26 21 - 32 mmol/L    ANION GAP 10 4 - 13 mmol/L    BUN 52 (H) 5 - 25 mg/dL    Creatinine 1 87 (H) 0 60 - 1 30 mg/dL    Glucose 139 65 - 140 mg/dL    Calcium 8 7 8 3 - 10 1 mg/dL    Corrected Calcium 10 0 8 3 - 10 1 mg/dL    AST 28 5 - 45 U/L    ALT 15 12 - 78 U/L    Alkaline Phosphatase 53 46 - 116 U/L    Total Protein 7 0 6 4 - 8 2 g/dL    Albumin 2 4 (L) 3 5 - 5 0 g/dL    Total Bilirubin 0 59 0 20 - 1 00 mg/dL    eGFR 32 ml/min/1 73sq m   CBC and differential    Collection Time: 02/01/22  4:56 AM   Result Value Ref Range    WBC 9 65 4 31 - 10 16 Thousand/uL    RBC 3 06 (L) 3 88 - 5 62 Million/uL    Hemoglobin 7 6 (L) 12 0 - 17 0 g/dL    Hematocrit 25 9 (L) 36 5 - 49 3 %    MCV 85 82 - 98 fL    MCH 24 8 (L) 26 8 - 34 3 pg    MCHC 29 3 (L) 31 4 - 37 4 g/dL    RDW 16 6 (H) 11 6 - 15 1 %    MPV 9 7 8 9 - 12 7 fL    Platelets 773 595 - 991 Thousands/uL    nRBC 0 /100 WBCs    Neutrophils Relative 89 (H) 43 - 75 %    Immat GRANS % 1 0 - 2 %    Lymphocytes Relative 5 (L) 14 - 44 %    Monocytes Relative 5 4 - 12 %    Eosinophils Relative 0 0 - 6 %    Basophils Relative 0 0 - 1 %    Neutrophils Absolute 8 51 (H) 1 85 - 7 62 Thousands/µL    Immature Grans Absolute 0 10 0 00 - 0 20 Thousand/uL    Lymphocytes Absolute 0 51 (L) 0 60 - 4 47 Thousands/µL    Monocytes Absolute 0 51 0 17 - 1 22 Thousand/µL    Eosinophils Absolute 0 00 0 00 - 0 61 Thousand/µL    Basophils Absolute 0 02 0 00 - 0 10 Thousands/µL        CORONARY CIRCULATION:  Proximal LAD: There was a tubular 30 % stenosis at the site of a prior stent  Mid LAD: There was a tubular 85 % stenosis  In a second lesion, there was a discrete 70 % stenosis  Proximal RCA: There was a tubular 75 % to 80% stenosis  There was AGUSTIN grade 3 flow through the vessel (brisk flow)  An intervention was performed      1ST LESION INTERVENTIONS:  A drug-eluting stent procedure was performed on the lesion in the proximal RCA  A Xience [address] Rx 3 5 x 18mm drug-eluting stent was placed across the lesion and deployed at a maximum inflation pressure of 14 robin      2ND LESION INTERVENTIONS:  A drug-eluting stent procedure was performed on the lesion in the mid LAD  A Xience [address] Rx 3 0 x 33mm drug-eluting stent was placed across the lesion and deployed at a maximum inflation pressure of 14 robin  Imaging: I have personally reviewed pertinent reports  Findings    Left Ventricle Left ventricular cavity size is normal  Wall thickness is moderately increased  The left ventricular ejection fraction is 65%  Systolic function is normal   Wall motion is normal  There is moderate concentric hypertrophy  Unable to assess diastolic function  Right Ventricle Right ventricular cavity size is mildly dilated  Systolic function is normal  Wall thickness is normal    Left Atrium The atrium is moderately dilated  Right Atrium The atrium is dilated  Aortic Valve The leaflets are severely calcified  There is severely reduced mobility  There is mild to moderate regurgitation  There is severe stenosis  Mean gradient 41 mmHg, FLY 0 91 cm2  Mitral Valve The mitral valve has normal function  There is moderate focal calcification  There is moderately reduced mobility  There is moderate annular calcification  There is mild regurgitation   There is likely moderate stenosis  Mean gradient 6 mmHg  The valve has normal function  Tricuspid Valve Tricuspid valve structure is normal  There is mild regurgitation  There is no evidence of stenosis  Pulmonic Valve Pulmonic valve structure is normal  There is no evidence of regurgitation  There is no evidence of stenosis  Ascending Aorta The aortic root is normal in size  IVC/SVC The inferior vena cava is normal in size  Pericardium There is no pericardial effusion  The pericardium is normal in appearance  Counseling / Coordination of Care  Total time spent today 30 minutes  Greater than 50% of total time was spent with the patient and / or family counseling and / or coordination of care

## 2022-02-01 NOTE — ASSESSMENT & PLAN NOTE
Wt Readings from Last 3 Encounters:   01/31/22 101 kg (223 lb)   01/24/22 131 kg (289 lb)   01/06/22 130 kg (286 lb)     - Chronic history of diastolic CHF with evidence of acute pulmonary edema  - Appreciate Cardiology evaluation and recommendations  - In setting of mildly elevated creatinine and suspected mild SHAWNEE superimposed on chronic kidney disease stage 3, Nephrology consulted   - Two doses of 40 mg IV Lasix given 1/30 after patient had increased oxygen demand and CXR indicated mild pulmonary vascular congestion   - Patient's oxygen demands from CHF make him high risk of prolonged intubation post-operatively  - Respiratory protocol  - Avoid IV fluids  - ECHO pending, does not have to be completed pre-operatively  - Monitor volume status closely with daily weights and accurate I/Os    - Continue home medication therapy post-operatively

## 2022-02-01 NOTE — ASSESSMENT & PLAN NOTE
- Chronic history of atrial fibrillation   - Patient normally anticoagulated with Xarelto  Currently on hold in perioperative period  - Plan to resume anticoagulation postoperatively  - Monitor for adequate heart rate control   - Continue home medication regimen as appropriate otherwise    - Appreciate Cardiology evaluation and recommendations   - Outpatient follow-up per team

## 2022-02-01 NOTE — ASSESSMENT & PLAN NOTE
Lab Results   Component Value Date    EGFR 29 01/31/2022    EGFR 27 01/31/2022    EGFR 32 01/30/2022    CREATININE 2 04 (H) 01/31/2022    CREATININE 2 16 (H) 01/31/2022    CREATININE 1 89 (H) 01/30/2022     - Patient with chronic history of CKD stage 3 with baseline creatinine appearing to be between 1 4 and 1 5   - Mild elevation of creatinine to 1 7 on presentation on 01/29/2022 and continued elevation of Cr to 1 90  - Appreciate Nephrology evaluation recommendations      - Two doses of IV lasix 40 mg given over the past 24 hours  - Avoid hypotension   - Continue to monitor

## 2022-02-01 NOTE — CASE MANAGEMENT
Case Management Progress Note    Patient name Sho Merrill  Location W /W -90 MRN [id number]  : 1940 Date 2022       LOS (days): 3  Geometric Mean LOS (GMLOS) (days): 3 80  Days to GMLOS:0 7        OBJECTIVE:        Current admission status: Inpatient  Preferred Pharmacy:   [facility] [address] [facility] [address],  [address] [address]  Phone: [phone] Fax: [phone] [facility] [place] [address]  Phone: [phone] Fax: [phone]    [facility] [address]  Phone: [phone] Fax: [phone]    Primary Care Provider: Marika Curry DO    Primary Insurance: MEDICARE  Secondary Insurance: COMMERCIAL MISCELLANEOUS    PROGRESS NOTE:      CM was asked to speak with daughters by phone for referrals to in-patient rehab  Daughters preferred pt go to [facility], [address] [place] [address] Rd held a discussion on the requirements that need to be met for pt to be eligible and appropriate for [facility]  All parties involved understood, and CM discussed referrals for other rehabilitation choices  Daughters agreed to a referral to [facility], [address])  CM placed referrals via [address] [place] [address]  CM followed-up in [address] [place] [address], [facility], possible bed available tomorrow

## 2022-02-01 NOTE — PROGRESS NOTES
[facility]  Progress Note Emma Pendleton 1940, 80 y o  male MRN: [id number]  Unit/Bed#: W -01 Encounter: [id number]  Primary Care Provider: Marika Crury DO   Date and time admitted to hospital: 1/29/2022  9:34 AM    Fall  Assessment & Plan  - Status post mechanical fall on ice with the below noted injuries and issues  - Fall precautions  - Geriatric Medicine consultation for evaluation, medication review and recommendations   - PT and OT evaluation and treatment as indicated  - Case Management consultation for disposition planning  * Closed fracture of right femur [facility])  Assessment & Plan  - Acute right intertrochanteric femur fracture, present on admission   - Appreciate Orthopedic surgery evaluation and recommendations s/p IM nail 1/31  - WBAT RLE  - Patient's respiratory status has improved with multiple interventions and cardiology, nephrology have evaluated the patient  - Cardiology consultation indicates patient is intermediate to high perioperative risk, however it is a necessary surgery in order for the patient to ambulate  - Consider restarting home Xarelto with stable post op hgb  - Monitor right lower extremity neurovascular exam   - Continue multimodal analgesic regimen   - Continue heparin for DVT prophylaxis  - PT and OT evaluation and treatment as indicated  Stage 3a chronic kidney disease [facility])  Assessment & Plan  Lab Results   Component Value Date    EGFR 29 01/31/2022    EGFR 27 01/31/2022    EGFR 32 01/30/2022    CREATININE 2 04 (H) 01/31/2022    CREATININE 2 16 (H) 01/31/2022    CREATININE 1 89 (H) 01/30/2022     - Patient with chronic history of CKD stage 3 with baseline creatinine appearing to be between 1 4 and 1 5   - Mild elevation of creatinine to 1 7 on presentation on 01/29/2022 and continued elevation of Cr to 1 90  - Appreciate Nephrology evaluation recommendations      - Two doses of IV lasix 40 mg given over the past 24 hours  - Avoid hypotension   - Continue to monitor    Acute kidney injury superimposed on chronic kidney disease [facility])  Assessment & Plan  Lab Results   Component Value Date    EGFR 29 01/31/2022    EGFR 27 01/31/2022    EGFR 32 01/30/2022    CREATININE 2 04 (H) 01/31/2022    CREATININE 2 16 (H) 01/31/2022    CREATININE 1 89 (H) 01/30/2022     - Patient with chronic history of CKD stage 3 with baseline creatinine appearing to be between 1 4 and 1 5   - Cr elevated on admission and has been worsening due to use of diuretics in the setting of pulmonary vascular congestion and acute respiratory distress and associated hypotension    - Appreciate nephrology consultation and recommendations  - IV fluids started perioperatively and stopped this am  - Cr down last evening to 2 from 2 16 - am labs pending    Hyponatremia  Assessment & Plan  - sodium down to 133 post operatively, on isolyte at 50cc/hr  - IVF stopped this am, monitor sodium  - AM labs pending    Chronic a-fib ([facility])  Assessment & Plan  - Chronic history of atrial fibrillation   - Patient normally anticoagulated with Xarelto  Currently on hold in perioperative period  - Plan to resume anticoagulation postoperatively  - Monitor for adequate heart rate control   - Continue home medication regimen as appropriate otherwise  - Appreciate Cardiology evaluation and recommendations   - Outpatient follow-up per team     Lumbar radiculopathy  Assessment & Plan  - Patient with chronic lumbar radiculopathy and pain in his legs  Patient reports acutely worsened pain in his calves and feet since his fall   - Neurovascular exam intact on initial evaluation   - T and L-spine x-rays indicate no fractures  - no further workup indicated at this time  - Continue multimodal analgesic regimen including gabapentin  - Outpatient follow-up with PCP and pain provider      Chronic diastolic CHF (congestive heart failure) ([facility])  Assessment & Plan  Wt Readings from Last 3 Encounters: 01/31/22 101 kg (223 lb)   01/24/22 131 kg (289 lb)   01/06/22 130 kg (286 lb)     - Chronic history of diastolic CHF with evidence of acute pulmonary edema  - Appreciate Cardiology evaluation and recommendations  - In setting of mildly elevated creatinine and suspected mild SHAWNEE superimposed on chronic kidney disease stage 3, Nephrology consulted   - Two doses of 40 mg IV Lasix given 1/30 after patient had increased oxygen demand and CXR indicated mild pulmonary vascular congestion   - Patient's oxygen demands from CHF make him high risk of prolonged intubation post-operatively  - Respiratory protocol  - Avoid IV fluids  - ECHO pending, does not have to be completed pre-operatively  - Monitor volume status closely with daily weights and accurate I/Os  - Continue home medication therapy post-operatively      Benign hypertension with chronic kidney disease, stage III [facility])  Assessment & Plan  Lab Results   Component Value Date    EGFR 29 01/31/2022    EGFR 27 01/31/2022    EGFR 32 01/30/2022    CREATININE 2 04 (H) 01/31/2022    CREATININE 2 16 (H) 01/31/2022    CREATININE 1 89 (H) 01/30/2022     - Patient with history of chronic hypertension   - Monitor blood pressures with goal systolic pressure less than 160  - Appreciate Cardiology and Nephrology evaluation and recommendations   - Outpatient follow-up with PCP per routine  Chronic pain syndrome  Assessment & Plan  - Patient with chronic pain syndrome   - Continue multimodal analgesic regimen with additional agents added/adjustments made in setting of acute pain secondary to traumatic injury  - Bowel regimen while on opioid therapy  - APS consult to assist with acute on chronic pain and anticipated postoperative pain  - Outpatient follow-up with PCP and pain provider          Disposition: continue med/surg status, dispo planning pending pt/ot evaluation today      SUBJECTIVE:  Chief Complaint: Right leg pain with movement    Subjective: Reports pain in right leg with movement  No other complaints, denies shortness of breath         OBJECTIVE:     Meds/Allergies     Current Facility-Administered Medications:     acetaminophen (TYLENOL) tablet 975 mg, 975 mg, Oral, Q8H [address], Justin Sanchez MD, 975 mg at 01/31/22 2122    albuterol (PROVENTIL HFA,VENTOLIN HFA) inhaler 2 puff, 2 puff, Inhalation, Q6H PRN, Justin Sanchez MD, 2 puff at 01/30/22 1136    allopurinol (ZYLOPRIM) tablet 200 mg, 200 mg, Oral, Daily, Justin Sanchez MD, 200 mg at 01/31/22 4510    ascorbic acid (VITAMIN C) tablet 500 mg, 500 mg, Oral, Daily, Justin Sanchez MD, 500 mg at 01/31/22 0911    calcium carbonate-vitamin D (OSCAL-D) 500 mg-200 units per tablet 2 tablet, 2 tablet, Oral, HS, Justin Sanchez MD, 2 tablet at 01/31/22 2121    cholecalciferol (VITAMIN D3) tablet 1,000 Units, 1,000 Units, Oral, Daily, Justin Sanchez MD, 1,000 Units at 01/31/22 0910    cyanocobalamin (VITAMIN B-12) tablet 100 mcg, 100 mcg, Oral, Daily, Justin Sanchez MD, 100 mcg at 01/31/22 0910    DULoxetine (CYMBALTA) delayed release capsule 60 mg, 60 mg, Oral, Daily, Justin Sanchez MD, 60 mg at 01/31/22 0910    felodipine (PLENDIL) 24 hr tablet 5 mg, 5 mg, Oral, Daily, Justin Sanchez MD, 5 mg at 01/30/22 1132    finasteride (PROSCAR) tablet 5 mg, 5 mg, Oral, Daily, Justin Sanchez MD, 5 mg at 01/31/22 0911    fluticasone-vilanterol (BREO ELLIPTA) 200-25 MCG/INH inhaler 1 puff, 1 puff, Inhalation, Daily, Justin Sanchez MD, 1 puff at 01/31/22 0912    gabapentin (NEURONTIN) capsule 300 mg, 300 mg, Oral, BID, Justin Sanchez MD, 300 mg at 01/31/22 1700    glucosamine sulfate capsule 500 mg, 500 mg, Oral, BID, Justin Sanchez MD, 500 mg at 01/31/22 1700    heparin (porcine) subcutaneous injection 5,000 Units, 5,000 Units, Subcutaneous, Q8H [address], 5,000 Units at 02/01/22 0500 **AND** Platelet count, , , Once, Justin Sanchez MD    HYDROmorphone (DILAUDID) injection 0 2 mg, 0 2 mg, Intravenous, Q4H PRN, Dany Fuentes MD    [facility] inhalation solution 0 63 mg, 0 63 mg, Nebulization, Q8H PRN, Dany Fuentes MD, 0 63 mg at 01/30/22 0332    levalbuterol [facility]) inhalation solution 1 25 mg, 1 25 mg, Nebulization, BID, Dany Fuentes MD, 1 25 mg at 01/31/22 2037    naloxone (NARCAN) 0 04 mg/mL syringe 0 04 mg, 0 04 mg, Intravenous, Q1MIN PRN, Dany Fuentes MD    oxyCODONE (ROXICODONE) IR tablet 2 5 mg, 2 5 mg, Oral, Q4H PRN, Dany Fuentes MD    oxyCODONE (ROXICODONE) IR tablet 5 mg, 5 mg, Oral, Q4H PRN, Dany Fuentes MD, 5 mg at 01/30/22 0541    polyethylene glycol (MIRALAX) packet 17 g, 17 g, Oral, Daily PRN, Dany Fuentes MD    pravastatin (PRAVACHOL) tablet 40 mg, 40 mg, Oral, Daily With Dinner, Dany Fuentes MD, 40 mg at 01/31/22 1649    senna-docusate sodium (SENOKOT S) 8 6-50 mg per tablet 1 tablet, 1 tablet, Oral, BID, Dany Fuentes MD, 1 tablet at 01/31/22 1700    sodium chloride 0 9 % inhalation solution 3 mL, 3 mL, Nebulization, BID, Dany Fuentes MD, 3 mL at 01/31/22 2037     Vitals:   Vitals:    02/01/22 0250   BP: 130/63   Pulse:    Resp: 18   Temp:    SpO2:        Intake/Output:  I/O       01/30 0701  01/31 0700 01/31 0701  02/01 0700    P  O  0 540    I V  (mL/kg)  892 5 (8 8)    IV Piggyback  50    Total Intake(mL/kg) 0 (0) 1482 5 (14 7)    Urine (mL/kg/hr) 2050 (0 8) 400 (0 2)    Blood  100    Total Output 2050 500    Net -2050 +982 5                 Nutrition/GI Proph/Bowel Reg: regular house, senokot S    Physical Exam:   GENERAL APPEARANCE: no acute distress, resting supine  NEURO: AAOX3, GCS 15, no focal neuro deficit  HEENT: PERRL, EOMI, mucus membranes moist  CV: RRR S1 S2 without murmur, rub, or gallop  LUNGS: Equal breath sounds bilaterally, mild basilar crackles in left lung base improve with deep breaths, no wheezes, rhonchi  GI: soft, obese, nontender, nondistended  : voiding independently  MSK: RLE tender with movement, DVNI  SKIN: warm, dry, intact surgical dressing intact without strike through           Invasive Devices  Report    Peripheral Intravenous Line            Peripheral IV 01/29/22 Right Antecubital 2 days    Peripheral IV 01/31/22 Right Hand <1 day          Drain            Urethral Catheter 16 Fr  1 day                 Lab Results:   BMP/CMP:   Lab Results   Component Value Date    SODIUM 133 (L) 01/31/2022    K 4 3 01/31/2022    CL 97 (L) 01/31/2022    CO2 28 01/31/2022    BUN 50 (H) 01/31/2022    CREATININE 2 04 (H) 01/31/2022    CALCIUM 8 8 01/31/2022    EGFR 29 01/31/2022    and CBC:   Lab Results   Component Value Date    WBC 9 65 02/01/2022    HGB 7 6 (L) 02/01/2022    HCT 25 9 (L) 02/01/2022    MCV 85 02/01/2022     02/01/2022    MCH 24 8 (L) 02/01/2022    MCHC 29 3 (L) 02/01/2022    RDW 16 6 (H) 02/01/2022    MPV 9 7 02/01/2022    NRBC 0 02/01/2022     Imaging/EKG Studies: Results: I have personally reviewed pertinent reports      Other Studies: none  VTE Prophylaxis: Sequential compression device (Venodyne)  and Heparin

## 2022-02-01 NOTE — PLAN OF CARE
Problem: OCCUPATIONAL THERAPY ADULT  Goal: Performs self-care activities at highest level of function for planned discharge setting  See evaluation for individualized goals  Description: Treatment Interventions: ADL retraining,Functional transfer training,Patient/family training,Equipment evaluation/education,Neuromuscular reeducation          See flowsheet documentation for full assessment, interventions and recommendations  Note: Limitation: Decreased ADL status,Decreased Safe judgement during ADL,Decreased sensation,Decreased high-level ADLs,Decreased self-care trans  Prognosis: Good  Assessment: Patient is a 80 y o  male seen for OT evaluation at [facility] following admission on 1/29/2022  s/p Closed fracture of right femur ([facility] [address] )  Comorbidities impacting functional performance include: chronic pain syndrome, HTN, SHAWNEE, CHF, a-fib, lumbar radiculopathy, stage 3a CKD, CAD, Hx of falls,   Patient presents with active orders for OT eval and treat and Up with assistance   Performed at least 2 patient identifiers during session including name and wristband  Pt reports being (I) with ADLs and needs assistance with IADLs  Pt lives with wife and 2 daughters in a 2 story home with 2 NATHEN first floor set up  Pt uses w/c at baseline to rest, does drive  Upon initial evaluation, patient requires supervision for UB ADLs, max assist for LB ADLs, and max assist x2 for transfers and bed mobility  Sit<>stand transfers trialed x3 today, unable to achieve upright standing position, will continue to further assess  Based on functional eval, patient presents A&Ox4 with intact  attention, impaired  safety awareness, and intact  short term and long term memory   Occupational performance is affected by the following deficits: decreased standing tolerance for self care tasks , decreased dynamic balance impacting functional reach, decreased activity tolerance , impaired safety awareness  and (+) pain  Based on these findings, functional performance deficits, and assessment findings, pt has been identified as a high complexity evaluation  Personal factors impacting performance at the time of evaluation include: decreased (+) Hx of falls , steps to enter home, decreased IADL independence and High fall risk   Patient would benefit from OT services to maximize level of functional independence and safety in self-care and transfers  Occupational areas to address include:bathing/showering, toileting, dressing , bed mobility , functional mobility, transfer to all surfaces, meal preparation, household management and fall prevention   From OT standpoint, recommendation at time of D/C would be post-acute rehabilitation         OT Discharge Recommendation: Post acute rehabilitation services  OT - OK to Discharge: Yes (once medically clear)     Janette Hinojosa OT

## 2022-02-01 NOTE — PROGRESS NOTES
Progress Note - Acute Pain Service    Alisson Cheema 80 y o  male MRN: [id number]  Unit/Bed#: W -01 Encounter: [id number]      Assessment:   Principal Problem:    Closed fracture of right femur ([address] )  Active Problems:    Chronic pain syndrome    Benign hypertension with chronic kidney disease, stage III ([facility])    Acute kidney injury superimposed on chronic kidney disease (HCC)    Chronic diastolic CHF (congestive heart failure) ([facility])    Chronic a-fib ([facility])    Lumbar radiculopathy    Hyponatremia    Stage 3a chronic kidney disease ([facility])    CAD (coronary artery disease)    Fall    Alisson Cheema is a 80 y o  male  with pmh s/of chronic pain syndrome, CAD s/p stents, mod/severe aortic stenosis, chronic a-fib, COPD, pacemaker, sleep apnea on CPAP, smoking history who presented 1/29 after slip and fall on ice with right femur fx  Surgical pinning originally scheduled 1/29, however pt had oxygen levels on admission in the mid 80s despite 6L O2  Pt was seen by cardiology for risk stratification and his respiratory status was optimized on 1/29 and surgery was moved to 1/30  Pt underwent insertion nail IM femur on 1/30 under [place] with PENG nerve block  Acute pain service consulted for post op pain management due to history of chronic pain  Pt seen this AM, resting comfortably in bed eating his breakfast  Pt is much more awake and alert today, and his respiratory status much improved  Denies pain while at rest  Pt has had only one dose tylenol overnight  Pt denies side effects from nerve block  States he would like to take a shower  Discussed the possibility of increased pain with movement today and the availability of pain medication should he need it prior to getting PT/OOB        Plan:   - Standard oral opioid regimen with oxycodone 5 mg/10 mg PO q4hrs PRN for moderate/severe pain, Dilaudid 0 5 mg IV q2hrs PRN for breakthrough pain  - Tylenol 975 mg PO q8hrs standing  - Robaxin 500 mg PO q6hrs   - Lidocaine patches to affected areas 12 hours on, 12 hours off    - Docusate (Colace) 100 mg PO twice daily  - Senna 1 tablet PO qhs    APS will continue to follow  Please contact Acute Pain Service - [facility] via [employer] from [phone] with additional questions or concerns  See Melanie or Carlos for additional contacts and after hours information  Pain History  Current pain location(s): denies  Pain Scale:   denies  Quality: denies  24 hour history: POD 1 insertion nail IM femur    Opioid requirement previous 24 hours: none    Meds/Allergies   all current active meds have been reviewed    No Known Allergies    Objective     Temp:  [97 5 °F (36 4 °C)-98 1 °F (36 7 °C)] 98 1 °F (36 7 °C)  HR:  [69-78] 69  Resp:  [16-27] 18  BP: (103-159)/(49-70) 114/49    Physical Exam  Constitutional:       Appearance: Normal appearance  Eyes:      Extraocular Movements: Extraocular movements intact  Conjunctiva/sclera: Conjunctivae normal    Cardiovascular:      Rate and Rhythm: Normal rate  Pulmonary:      Effort: Pulmonary effort is normal    Abdominal:      Palpations: Abdomen is soft  Musculoskeletal:         General: No deformity  Cervical back: Normal range of motion  Right lower leg: No edema  Left lower leg: No edema  Skin:     General: Skin is warm and dry  Neurological:      General: No focal deficit present  Mental Status: He is alert and oriented to person, place, and time     Psychiatric:         Mood and Affect: Mood normal          Behavior: Behavior normal          Lab Results:   Results from last 7 days   Lab Units 02/01/22  0456   WBC Thousand/uL 9 65   HEMOGLOBIN g/dL 7 6*   HEMATOCRIT % 25 9*   PLATELETS Thousands/uL 190      Results from last 7 days   Lab Units 02/01/22  0456   POTASSIUM mmol/L 4 2   CHLORIDE mmol/L 98*   CO2 mmol/L 26   BUN mg/dL 52*   CREATININE mg/dL 1 87*   CALCIUM mg/dL 8 7   ALK PHOS U/L 53   ALT U/L 15   AST U/L 28       Please note that the APS provides consultative services regarding pain management only  With the exception of ketamine and epidural infusions and except when indicated, final decisions regarding starting or changing doses of analgesic medications are at the discretion of the consulting service  Off hours consultation and/or medication management is generally not available      Noah Trevino MD  Acute Pain Service

## 2022-02-01 NOTE — PROGRESS NOTES
Orthopedics   Nia Andersen 80 y o  male MRN: [id number]  Unit/Bed#: W -01      Subjective:  81 y o male post operative day 1 right femoral intramedullary nail  Pt doing well  Pain controlled  No fevers or chills  No chest pain or shortness of breath      Labs:  0   Lab Value Date/Time    HCT 25 9 (L) 02/01/2022 0456    HCT 27 1 (L) 01/31/2022 0510    HCT 30 6 (L) 01/30/2022 0537    HCT 45 9 01/07/2017 0834    HCT 43 8 07/16/2016 0752    HCT 41 0 04/07/2016 1141    HGB 7 6 (L) 02/01/2022 0456    HGB 8 1 (L) 01/31/2022 0510    HGB 8 9 (L) 01/30/2022 0537    HGB 15 3 01/07/2017 0834    HGB 14 9 07/16/2016 0752    HGB 13 4 04/07/2016 1141    INR 1 57 (H) 11/21/2021 1330    WBC 9 65 02/01/2022 0456    WBC 11 24 (H) 01/31/2022 0510    WBC 13 15 (H) 01/30/2022 0537    WBC 8 40 03/20/2018 0000    WBC 11-30 (A) 06/15/2017 1023    WBC 9 9 01/07/2017 0834    WBC 9 2 07/16/2016 0752       Meds:    Current Facility-Administered Medications:     acetaminophen (TYLENOL) tablet 975 mg, 975 mg, Oral, Q8H [address], Kaur Reeves MD, 975 mg at 01/31/22 2122    albuterol (PROVENTIL HFA,VENTOLIN HFA) inhaler 2 puff, 2 puff, Inhalation, Q6H PRN, Kaur Reeves MD, 2 puff at 01/30/22 1136    allopurinol (ZYLOPRIM) tablet 200 mg, 200 mg, Oral, Daily, Kaur Reeves MD, 200 mg at 02/01/22 0830    ascorbic acid (VITAMIN C) tablet 500 mg, 500 mg, Oral, Daily, Kaur Reeves MD, 500 mg at 02/01/22 0830    calcium carbonate-vitamin D (OSCAL-D) 500 mg-200 units per tablet 2 tablet, 2 tablet, Oral, HS, Kaur Reeves MD, 2 tablet at 01/31/22 2121    cholecalciferol (VITAMIN D3) tablet 1,000 Units, 1,000 Units, Oral, Daily, Kaur Reeves MD, 1,000 Units at 02/01/22 0830    cyanocobalamin (VITAMIN B-12) tablet 100 mcg, 100 mcg, Oral, Daily, Kaur Reeves MD, 100 mcg at 02/01/22 0830    DULoxetine (CYMBALTA) delayed release capsule 60 mg, 60 mg, Oral, Daily, Kaur Reeves MD, 60 mg at 02/01/22 4084    felodipine (PLENDIL) 24 hr tablet 5 mg, 5 mg, Oral, Daily, Dany Fuentes MD, 5 mg at 02/01/22 0830    finasteride (PROSCAR) tablet 5 mg, 5 mg, Oral, Daily, Dany Fuentes MD, 5 mg at 02/01/22 0830    fluticasone-vilanterol (BREO ELLIPTA) 200-25 MCG/INH inhaler 1 puff, 1 puff, Inhalation, Daily, Dany Fuentes MD, 1 puff at 02/01/22 6643    gabapentin (NEURONTIN) capsule 300 mg, 300 mg, Oral, BID, Dany Fuentes MD, 300 mg at 02/01/22 0830    glucosamine sulfate capsule 500 mg, 500 mg, Oral, BID, Dany uFentes MD, 500 mg at 02/01/22 0830    heparin (porcine) subcutaneous injection 5,000 Units, 5,000 Units, Subcutaneous, Q8H [facility], 5,000 Units at 02/01/22 0500 **AND** Platelet count, , , Once, Dany Fuentes MD    HYDROmorphone (DILAUDID) injection 0 2 mg, 0 2 mg, Intravenous, Q4H PRN, Dany Fuentes MD    levalbuterol Adelita Boeck) inhalation solution 0 63 mg, 0 63 mg, Nebulization, Q8H PRN, Dany Fuentes MD, 0 63 mg at 01/30/22 6970    levalbuterol Adelita Boeck) inhalation solution 1 25 mg, 1 25 mg, Nebulization, BID, Dany Fuentes MD, 1 25 mg at 02/01/22 0717    naloxone (NARCAN) 0 04 mg/mL syringe 0 04 mg, 0 04 mg, Intravenous, Q1MIN PRN, Dany Fuentes MD    oxyCODONE (ROXICODONE) IR tablet 2 5 mg, 2 5 mg, Oral, Q4H PRN, Dany Fuentes MD    oxyCODONE (ROXICODONE) IR tablet 5 mg, 5 mg, Oral, Q4H PRN, Dany Fuentes MD, 5 mg at 02/01/22 1018    polyethylene glycol (MIRALAX) packet 17 g, 17 g, Oral, Daily PRN, Dany Fuentes MD    pravastatin (PRAVACHOL) tablet 40 mg, 40 mg, Oral, Daily With Rica Trejo MD, 40 mg at 01/31/22 1649    senna-docusate sodium (SENOKOT S) 8 6-50 mg per tablet 1 tablet, 1 tablet, Oral, BID, Dany Fuentes MD, 1 tablet at 02/01/22 0830    sodium chloride 0 9 % inhalation solution 3 mL, 3 mL, Nebulization, BID, Dany Fuentes MD, 3 mL at 02/01/22 7412    Blood Culture:   Lab Results   Component Value Date    BLOODCX No Growth After 5 Days  09/13/2020    BLOODCX No Growth After 5 Days  09/13/2020       Wound Culture:   No results found for: WOUNDCULT    Ins and Outs:  I/O last 24 hours: In: 1962 5 [P O :1020; I V :892 5; IV Piggyback:50]  Out: 0024 [Urine:1750; Blood:100]          Physical exam:  Vitals:    02/01/22 1109   BP: (!) 114/49   Pulse: 70   Resp: 18   Temp: (!) 97 2 °F (36 2 °C)   SpO2: 94%     right lower extremity  · Dressings are clean dry intact  · Sensation intact to light touch L2-S1  · No excessive soft tissue swelling in thigh  · Motor intact to knee flexion/extension, EHL/FHL  · 2+ DP pulse    Assessment: 81 y o male post operative day 1 right femur IMN   Pt doing well    Plan:  · Up and out of bed  · Weightbearing as tolerated right lower extremity  · PT/OT  · DVT prophylaxis for 30 days postoperatively  · Analgesics  · Dispo: Ortho will follow  · Will continue to assess for acute blood loss anemia, patient has chronic anemia he has not dropped more than 2 g of hemoglobin postoperatively compared to preop levels  · Follow up with Dr Kailey Cooley post op    Kandis Saunders PA-C

## 2022-02-01 NOTE — CONSULTS
APS following, please see progress notes for details of encounter      NAVA Phelan  Acute Pain Service

## 2022-02-01 NOTE — ASSESSMENT & PLAN NOTE
Lab Results   Component Value Date    EGFR 29 01/31/2022    EGFR 27 01/31/2022    EGFR 32 01/30/2022    CREATININE 2 04 (H) 01/31/2022    CREATININE 2 16 (H) 01/31/2022    CREATININE 1 89 (H) 01/30/2022     - Patient with chronic history of CKD stage 3 with baseline creatinine appearing to be between 1 4 and 1 5   - Cr elevated on admission and has been worsening due to use of diuretics in the setting of pulmonary vascular congestion and acute respiratory distress and associated hypotension    - Appreciate nephrology consultation and recommendations  - IV fluids started perioperatively and stopped this am  - Cr down last evening to 2 from 2 16 - am labs pending

## 2022-02-01 NOTE — ASSESSMENT & PLAN NOTE
Lab Results   Component Value Date    EGFR 29 01/31/2022    EGFR 27 01/31/2022    EGFR 32 01/30/2022    CREATININE 2 04 (H) 01/31/2022    CREATININE 2 16 (H) 01/31/2022    CREATININE 1 89 (H) 01/30/2022     - Patient with history of chronic hypertension   - Monitor blood pressures with goal systolic pressure less than 160  - Appreciate Cardiology and Nephrology evaluation and recommendations   - Outpatient follow-up with PCP per routine

## 2022-02-02 ENCOUNTER — TELEPHONE (OUTPATIENT)
Dept: GASTROENTEROLOGY | Facility: CLINIC | Age: 82
End: 2022-02-02

## 2022-02-02 ENCOUNTER — HOSPITAL ENCOUNTER (INPATIENT)
Dept: VASCULAR ULTRASOUND | Facility: HOSPITAL | Age: 82
Discharge: HOME | DRG: 480 | End: 2022-02-02
Payer: MEDICARE

## 2022-02-02 ENCOUNTER — APPOINTMENT (INPATIENT)
Dept: RADIOLOGY | Facility: HOSPITAL | Age: 82
DRG: 480 | End: 2022-02-02
Payer: MEDICARE

## 2022-02-02 ENCOUNTER — TELEPHONE (OUTPATIENT)
Dept: OBGYN CLINIC | Facility: HOSPITAL | Age: 82
End: 2022-02-02

## 2022-02-02 PROBLEM — M79.604 PAIN IN BOTH LOWER EXTREMITIES: Status: ACTIVE | Noted: 2018-01-31

## 2022-02-02 PROBLEM — M79.605 PAIN IN BOTH LOWER EXTREMITIES: Status: ACTIVE | Noted: 2018-01-31

## 2022-02-02 LAB
ANION GAP SERPL CALCULATED.3IONS-SCNC: 7 MMOL/L (ref 4–13)
BASOPHILS # BLD AUTO: 0.03 THOUSANDS/ΜL (ref 0–0.1)
BASOPHILS NFR BLD AUTO: 0 % (ref 0–1)
BUN SERPL-MCNC: 59 MG/DL (ref 5–25)
CALCIUM SERPL-MCNC: 8.8 MG/DL (ref 8.3–10.1)
CHLORIDE SERPL-SCNC: 96 MMOL/L (ref 100–108)
CO2 SERPL-SCNC: 27 MMOL/L (ref 21–32)
CREAT SERPL-MCNC: 1.86 MG/DL (ref 0.6–1.3)
EOSINOPHIL # BLD AUTO: 0.04 THOUSAND/ΜL (ref 0–0.61)
EOSINOPHIL NFR BLD AUTO: 0 % (ref 0–6)
ERYTHROCYTE [DISTWIDTH] IN BLOOD BY AUTOMATED COUNT: 16.4 % (ref 11.6–15.1)
GFR SERPL CREATININE-BSD FRML MDRD: 33 ML/MIN/1.73SQ M
GLUCOSE SERPL-MCNC: 131 MG/DL (ref 65–140)
HCT VFR BLD AUTO: 24.9 % (ref 36.5–49.3)
HGB BLD-MCNC: 7.5 G/DL (ref 12–17)
IMM GRANULOCYTES # BLD AUTO: 0.22 THOUSAND/UL (ref 0–0.2)
IMM GRANULOCYTES NFR BLD AUTO: 2 % (ref 0–2)
LYMPHOCYTES # BLD AUTO: 1.02 THOUSANDS/ΜL (ref 0.6–4.47)
LYMPHOCYTES NFR BLD AUTO: 10 % (ref 14–44)
MCH RBC QN AUTO: 25.3 PG (ref 26.8–34.3)
MCHC RBC AUTO-ENTMCNC: 30.1 G/DL (ref 31.4–37.4)
MCV RBC AUTO: 84 FL (ref 82–98)
MONOCYTES # BLD AUTO: 0.78 THOUSAND/ΜL (ref 0.17–1.22)
MONOCYTES NFR BLD AUTO: 7 % (ref 4–12)
NEUTROPHILS # BLD AUTO: 8.54 THOUSANDS/ΜL (ref 1.85–7.62)
NEUTS SEG NFR BLD AUTO: 81 % (ref 43–75)
NRBC BLD AUTO-RTO: 1 /100 WBCS
PLATELET # BLD AUTO: 212 THOUSANDS/UL (ref 149–390)
PMV BLD AUTO: 9.4 FL (ref 8.9–12.7)
POTASSIUM SERPL-SCNC: 3.7 MMOL/L (ref 3.5–5.3)
RBC # BLD AUTO: 2.96 MILLION/UL (ref 3.88–5.62)
SODIUM SERPL-SCNC: 130 MMOL/L (ref 136–145)
WBC # BLD AUTO: 10.63 THOUSAND/UL (ref 4.31–10.16)

## 2022-02-02 PROCEDURE — 94003 VENT MGMT INPAT SUBQ DAY: CPT

## 2022-02-02 PROCEDURE — 71045 X-RAY EXAM CHEST 1 VIEW: CPT

## 2022-02-02 PROCEDURE — 99232 SBSQ HOSP IP/OBS MODERATE 35: CPT | Performed by: INTERNAL MEDICINE

## 2022-02-02 PROCEDURE — 99232 SBSQ HOSP IP/OBS MODERATE 35: CPT | Performed by: ANESTHESIOLOGY

## 2022-02-02 PROCEDURE — 94640 AIRWAY INHALATION TREATMENT: CPT

## 2022-02-02 PROCEDURE — 99233 SBSQ HOSP IP/OBS HIGH 50: CPT | Performed by: SURGERY

## 2022-02-02 PROCEDURE — 94760 N-INVAS EAR/PLS OXIMETRY 1: CPT

## 2022-02-02 PROCEDURE — 99024 POSTOP FOLLOW-UP VISIT: CPT | Performed by: PHYSICIAN ASSISTANT

## 2022-02-02 PROCEDURE — 80048 BASIC METABOLIC PNL TOTAL CA: CPT | Performed by: STUDENT IN AN ORGANIZED HEALTH CARE EDUCATION/TRAINING PROGRAM

## 2022-02-02 PROCEDURE — 85025 COMPLETE CBC W/AUTO DIFF WBC: CPT | Performed by: STUDENT IN AN ORGANIZED HEALTH CARE EDUCATION/TRAINING PROGRAM

## 2022-02-02 PROCEDURE — 93970 EXTREMITY STUDY: CPT

## 2022-02-02 RX ORDER — FUROSEMIDE 20 MG/1
20 TABLET ORAL DAILY
Status: DISCONTINUED | OUTPATIENT
Start: 2022-02-02 | End: 2022-02-02

## 2022-02-02 RX ORDER — FUROSEMIDE 10 MG/ML
40 INJECTION INTRAMUSCULAR; INTRAVENOUS ONCE
Status: COMPLETED | OUTPATIENT
Start: 2022-02-02 | End: 2022-02-02

## 2022-02-02 RX ORDER — BISACODYL 10 MG
10 SUPPOSITORY, RECTAL RECTAL DAILY PRN
Status: DISCONTINUED | OUTPATIENT
Start: 2022-02-02 | End: 2022-02-03

## 2022-02-02 RX ORDER — FUROSEMIDE 40 MG/1
40 TABLET ORAL DAILY
Status: DISCONTINUED | OUTPATIENT
Start: 2022-02-02 | End: 2022-02-03

## 2022-02-02 RX ORDER — POLYETHYLENE GLYCOL 3350 17 G/17G
17 POWDER, FOR SOLUTION ORAL DAILY
Status: DISCONTINUED | OUTPATIENT
Start: 2022-02-02 | End: 2022-02-07 | Stop reason: HOSPADM

## 2022-02-02 RX ADMIN — PRAVASTATIN SODIUM 40 MG: 40 TABLET ORAL at 17:40

## 2022-02-02 RX ADMIN — Medication 2 TABLET: at 22:31

## 2022-02-02 RX ADMIN — OXYCODONE HYDROCHLORIDE 5 MG: 5 TABLET ORAL at 10:11

## 2022-02-02 RX ADMIN — FUROSEMIDE 40 MG: 40 TABLET ORAL at 08:29

## 2022-02-02 RX ADMIN — RIVAROXABAN 15 MG: 15 TABLET, FILM COATED ORAL at 22:31

## 2022-02-02 RX ADMIN — GABAPENTIN 300 MG: 300 CAPSULE ORAL at 08:30

## 2022-02-02 RX ADMIN — SENNOSIDES AND DOCUSATE SODIUM 1 TABLET: 50; 8.6 TABLET ORAL at 17:40

## 2022-02-02 RX ADMIN — FINASTERIDE 5 MG: 5 TABLET, FILM COATED ORAL at 08:29

## 2022-02-02 RX ADMIN — Medication 100 MCG: at 08:29

## 2022-02-02 RX ADMIN — FUROSEMIDE 40 MG: 10 INJECTION, SOLUTION INTRAMUSCULAR; INTRAVENOUS at 14:18

## 2022-02-02 RX ADMIN — ISODIUM CHLORIDE 3 ML: 0.03 SOLUTION RESPIRATORY (INHALATION) at 21:02

## 2022-02-02 RX ADMIN — ASPIRIN 81 MG CHEWABLE TABLET 81 MG: 81 TABLET CHEWABLE at 08:29

## 2022-02-02 RX ADMIN — ACETAMINOPHEN 975 MG: 325 TABLET, FILM COATED ORAL at 22:31

## 2022-02-02 RX ADMIN — HEPARIN SODIUM 5000 UNITS: 5000 INJECTION INTRAVENOUS; SUBCUTANEOUS at 05:19

## 2022-02-02 RX ADMIN — HEPARIN SODIUM 5000 UNITS: 5000 INJECTION INTRAVENOUS; SUBCUTANEOUS at 14:20

## 2022-02-02 RX ADMIN — SENNOSIDES AND DOCUSATE SODIUM 1 TABLET: 50; 8.6 TABLET ORAL at 08:30

## 2022-02-02 RX ADMIN — ALLOPURINOL 200 MG: 100 TABLET ORAL at 08:29

## 2022-02-02 RX ADMIN — Medication 500 MG: at 17:40

## 2022-02-02 RX ADMIN — DULOXETINE HYDROCHLORIDE 60 MG: 60 CAPSULE, DELAYED RELEASE ORAL at 08:30

## 2022-02-02 RX ADMIN — LEVALBUTEROL HYDROCHLORIDE 1.25 MG: 1.25 SOLUTION, CONCENTRATE RESPIRATORY (INHALATION) at 07:36

## 2022-02-02 RX ADMIN — ISODIUM CHLORIDE 3 ML: 0.03 SOLUTION RESPIRATORY (INHALATION) at 07:36

## 2022-02-02 RX ADMIN — FLUTICASONE FUROATE AND VILANTEROL TRIFENATATE 1 PUFF: 200; 25 POWDER RESPIRATORY (INHALATION) at 08:31

## 2022-02-02 RX ADMIN — LEVALBUTEROL HYDROCHLORIDE 1.25 MG: 1.25 SOLUTION, CONCENTRATE RESPIRATORY (INHALATION) at 21:02

## 2022-02-02 RX ADMIN — ACETAMINOPHEN 975 MG: 325 TABLET, FILM COATED ORAL at 14:20

## 2022-02-02 RX ADMIN — OXYCODONE HYDROCHLORIDE 5 MG: 5 TABLET ORAL at 05:19

## 2022-02-02 RX ADMIN — GABAPENTIN 300 MG: 300 CAPSULE ORAL at 17:40

## 2022-02-02 RX ADMIN — Medication 1000 UNITS: at 08:30

## 2022-02-02 RX ADMIN — Medication 500 MG: at 08:30

## 2022-02-02 RX ADMIN — ACETAMINOPHEN 975 MG: 325 TABLET, FILM COATED ORAL at 05:19

## 2022-02-02 RX ADMIN — OXYCODONE HYDROCHLORIDE AND ACETAMINOPHEN 500 MG: 500 TABLET ORAL at 08:29

## 2022-02-02 RX ADMIN — POLYETHYLENE GLYCOL 3350 17 G: 17 POWDER, FOR SOLUTION ORAL at 08:30

## 2022-02-02 NOTE — PLAN OF CARE
Problem: Potential for Falls  Goal: Patient will remain free of falls  Description: INTERVENTIONS:  - Educate patient/family on patient safety including physical limitations  - Instruct patient to call for assistance with activity   - Consult OT/PT to assist with strengthening/mobility   - Keep Call bell within reach  - Keep bed low and locked with side rails adjusted as appropriate  - Keep care items and personal belongings within reach  - Initiate and maintain comfort rounds  - Make Fall Risk Sign visible to staff  - Offer Toileting every  Hours, in advance of need  - Initiate/Maintain alarm  - Obtain necessary fall risk management equipment:   - Apply yellow socks and bracelet for high fall risk patients  - Consider moving patient to room near nurses station  Outcome: Progressing     Problem: Prexisting or High Potential for Compromised Skin Integrity  Goal: Skin integrity is maintained or improved  Description: INTERVENTIONS:  - Identify patients at risk for skin breakdown  - Assess and monitor skin integrity  - Assess and monitor nutrition and hydration status  - Monitor labs   - Assess for incontinence   - Turn and reposition patient  - Assist with mobility/ambulation  - Relieve pressure over bony prominences  - Avoid friction and shearing  - Provide appropriate hygiene as needed including keeping skin clean and dry  - Evaluate need for skin moisturizer/barrier cream  - Collaborate with interdisciplinary team   - Patient/family teaching  - Consider wound care consult   Outcome: Progressing     Problem: MOBILITY - ADULT  Goal: Maintain or return to baseline ADL function  Description: INTERVENTIONS:  -  Assess patient's ability to carry out ADLs; assess patient's baseline for ADL function and identify physical deficits which impact ability to perform ADLs (bathing, care of mouth/teeth, toileting, grooming, dressing, etc )  - Assess/evaluate cause of self-care deficits   - Assess range of motion  - Assess patient's mobility; develop plan if impaired  - Assess patient's need for assistive devices and provide as appropriate  - Encourage maximum independence but intervene and supervise when necessary  - Involve family in performance of ADLs  - Assess for home care needs following discharge   - Consider OT consult to assist with ADL evaluation and planning for discharge  - Provide patient education as appropriate  Outcome: Progressing  Goal: Maintains/Returns to pre admission functional level  Description: INTERVENTIONS:  - Perform BMAT or MOVE assessment daily    - Set and communicate daily mobility goal to care team and patient/family/caregiver  - Collaborate with rehabilitation services on mobility goals if consulted  - Perform Range of Motion  times a day  - Reposition patient every  hours    - Dangle patient times a day  - Stand patient  times a day  - Ambulate patient  times a day  - Out of bed to chair  times a day   - Out of bed for meals  times a day  - Out of bed for toileting  - Record patient progress and toleration of activity level   Outcome: Progressing

## 2022-02-02 NOTE — TELEPHONE ENCOUNTER
I do not think there will be a provider in rehab to administer these  If he has transportation from rehab to here, he can finish his series  His operating surgeon should give the okay for this though

## 2022-02-02 NOTE — PROGRESS NOTES
[facility] PROGRESS NOTE   Nahed Jj 80 y o  male MRN: [id number]  Unit/Bed#: W -01 Encounter: [id number]  Reason for Consult:  Acute kidney injury on CKD    Summary:  59-year-old male with a history of CKD, CAD, hypertension, nephrolithiasis, diastolic CHF, valvular heart disease, gout, smoking history who was admitted to [facility]  after falling and sustaining fracture of the proximal right femoral   nephrology following for management of acute kidney injury on CKD  ASSESSMENT and PLAN:  Acute kidney injury:  · Baseline creatinine 1 3-1 6 mg/dL  · Creatinine 1 7 on admission increasing and peaking at 2 16 preoperatively on 01/31  · Given a course of IV fluids with improvement in creatinine and stable renal function postoperatively although creatinine remains slightly above baseline  · Etiology:  Likely related to soft blood pressure/relative hypotension/diuresis  · 2/2:  Creatinine 1 86  Again slightly above baseline but overall acceptable  · Plan:  · No further IV fluids indicated  · Will need to resume diuretics, agree with starting furosemide    Chronic kidney disease, stage III:  · Follows with Dr Aaliyah Anne    Right proximal femoral fracture:  · Status post surgical repair on 01/31/2022    CHF with valvular disease:  · Ejection fraction 16%, normal systolic function, moderate concentric hypertrophy, severe aortic stenosis, moderate mitral valve stenosis, mild TR  · Continues to require nasal cannula oxygen  Resume diuretics    Hyponatremia:  · Likely volume mediated, holding diuretics  · 2/2:  Diuretics resumed  Monitor    Anemia:  · Hemoglobin low but stable, 7 5 postoperatively      DISPOSITION:  Resume diuretics-increasing oxygen requirements,  Endorse 1 dose of IV Lasix 20-30 mg  blood pressure is still on the lower side  Avoid over-diuresis  Communicated with Cardiology    SUBJECTIVE / 24H INTERVAL HISTORY:  No acute complaints    States his appetite is less than usual   He is self imposing a fluid restriction of 60 oz a day which is his usual fluid restriction at home  Denies unusual shortness of breath, chest pain, diarrhea  No overnight events reported    OBJECTIVE:  Current Weight: Weight - Scale: 105 kg (231 lb 7 7 oz)  Vitals:    02/01/22 1543 02/01/22 2221 02/02/22 0600 02/02/22 0740   BP: 116/53 108/54  127/62   Pulse: 70 66  70   Resp:  19  18   Temp: 98 °F (36 7 °C) 99 5 °F (37 5 °C)  97 5 °F (36 4 °C)   TempSrc: Oral      SpO2: 97% (!) 86%  97%   Weight:   105 kg (231 lb 7 7 oz)    Height:           Intake/Output Summary (Last 24 hours) at 2/2/2022 0941  Last data filed at 2/2/2022 0910  Gross per 24 hour   Intake 2480 ml   Output 1300 ml   Net 1180 ml     General: NAD  Skin: no rash, no mottling or cyanosis, warm and dry  Eyes: anicteric sclera  ENT: moist mucous membrane  Oropharynx clear  Neck: supple, normal range of motion  Chest: CTA b/l, no ronchii, no wheeze, no rubs, no rales  Normal effort  Remains on nasal cannula oxygen  CVS: X6F4, systolic murmur, no gallop, no rub    Normal rate  Abdomen: soft, nontender, nl sounds, protuberant  Extremities:  Mild edema LE b/l  : no denney  Neuro: AAOX3  Psych: normal affect  Medications:    Current Facility-Administered Medications:     acetaminophen (TYLENOL) tablet 975 mg, 975 mg, Oral, Q8H [address], Anton Means MD, 975 mg at 02/02/22 0519    albuterol (PROVENTIL HFA,VENTOLIN HFA) inhaler 2 puff, 2 puff, Inhalation, Q6H PRN, Anton Means MD, 2 puff at 01/30/22 1136    allopurinol (ZYLOPRIM) tablet 200 mg, 200 mg, Oral, Daily, Anton Means MD, 200 mg at 02/02/22 8874    ascorbic acid (VITAMIN C) tablet 500 mg, 500 mg, Oral, Daily, Anton Means MD, 500 mg at 02/02/22 0002    aspirin chewable tablet 81 mg, 81 mg, Oral, Daily, NAVA Geiger, 81 mg at 02/02/22 3325    bisacodyl (DULCOLAX) rectal suppository 10 mg, 10 mg, Rectal, Daily PRN, NAVA Perez    calcium carbonate-vitamin D (OSCAL-D) 500 mg-200 units per tablet 2 tablet, 2 tablet, Oral, HS, Justin Sanchez MD, 2 tablet at 02/01/22 2321    cholecalciferol (VITAMIN D3) tablet 1,000 Units, 1,000 Units, Oral, Daily, Justin Sanchez MD, 1,000 Units at 02/02/22 0830    cyanocobalamin (VITAMIN B-12) tablet 100 mcg, 100 mcg, Oral, Daily, Justin Sanchez MD, 100 mcg at 02/02/22 0481    DULoxetine (CYMBALTA) delayed release capsule 60 mg, 60 mg, Oral, Daily, Justin Sanchez MD, 60 mg at 02/02/22 0830    felodipine (PLENDIL) 24 hr tablet 5 mg, 5 mg, Oral, Daily, Justin Sanchez MD, 5 mg at 02/01/22 0830    finasteride (PROSCAR) tablet 5 mg, 5 mg, Oral, Daily, Justin Sanchez MD, 5 mg at 02/02/22 0829    fluticasone-vilanterol (BREO ELLIPTA) 200-25 MCG/INH inhaler 1 puff, 1 puff, Inhalation, Daily, Justin Sanchez MD, 1 puff at 02/02/22 0831    furosemide (LASIX) tablet 20 mg, 20 mg, Oral, Daily, Miguel Kim MD    furosemide (LASIX) tablet 40 mg, 40 mg, Oral, Daily, Miguel Kim MD, 40 mg at 02/02/22 5331    gabapentin (NEURONTIN) capsule 300 mg, 300 mg, Oral, BID, Justin Sanchez MD, 300 mg at 02/02/22 0830    glucosamine sulfate capsule 500 mg, 500 mg, Oral, BID, Justin Sanhcez MD, 500 mg at 02/02/22 0830    heparin (porcine) subcutaneous injection 5,000 Units, 5,000 Units, Subcutaneous, Q8H [address], 5,000 Units at 02/02/22 0519 **AND** Platelet count, , , Once, Justin Sanchez MD    HYDROmorphone (DILAUDID) injection 0 2 mg, 0 2 mg, Intravenous, Q4H PRN, Justin Sanchez MD    levalbuterol Etheleen Ugo) inhalation solution 0 63 mg, 0 63 mg, Nebulization, Q8H PRN, Justin Sanchez MD, 0 63 mg at 01/30/22 3841    levalbuterol Etheleen Bienville) inhalation solution 1 25 mg, 1 25 mg, Nebulization, BID, Justin Sanchez MD, 1 25 mg at 02/02/22 0736    naloxone (NARCAN) 0 04 mg/mL syringe 0 04 mg, 0 04 mg, Intravenous, Q1MIN PRN, Justin Sanchez MD    oxyCODONE (ROXICODONE) IR tablet 2 5 mg, 2 5 mg, Oral, Q4H PRN, Curtis Armstrong Roberto Welch MD    oxyCODONE (ROXICODONE) IR tablet 5 mg, 5 mg, Oral, Q4H PRN, Ferny Colón MD, 5 mg at 02/02/22 0519    polyethylene glycol (MIRALAX) packet 17 g, 17 g, Oral, Daily, NAVA Weebr, 17 g at 02/02/22 0830    pravastatin (PRAVACHOL) tablet 40 mg, 40 mg, Oral, Daily With Dinner, Ferny Colón MD, 40 mg at 02/01/22 1801    senna-docusate sodium (SENOKOT S) 8 6-50 mg per tablet 1 tablet, 1 tablet, Oral, BID, Ferny Colón MD, 1 tablet at 02/02/22 0830    sodium chloride 0 9 % inhalation solution 3 mL, 3 mL, Nebulization, BID, Ferny Colón MD, 3 mL at 02/02/22 0736    Laboratory Results:  Results from last 7 days   Lab Units 02/02/22  0602 02/01/22  0456 01/31/22  2247 01/31/22  2241 01/31/22  0510 01/30/22  0537 01/29/22  1016   WBC Thousand/uL 10 63* 9 65  --   --  11 24* 13 15* 9 20   HEMOGLOBIN g/dL 7 5* 7 6*  --   --  8 1* 8 9* 9 9*   HEMATOCRIT % 24 9* 25 9*  --   --  27 1* 30 6* 33 7*   PLATELETS Thousands/uL 212 190 204  --  226 237 238   POTASSIUM mmol/L 3 7 4 2  --  4 3 3 9 4 2 3 8   CHLORIDE mmol/L 96* 98*  --  97* 101 101 100   CO2 mmol/L 27 26  --  28 26 26 28   BUN mg/dL 59* 52*  --  50* 49* 41* 38*   CREATININE mg/dL 1 86* 1 87*  --  2 04* 2 16* 1 89* 1 70*   CALCIUM mg/dL 8 8 8 7  --  8 8 8 8 9 1 9 3   MAGNESIUM mg/dL  --   --   --   --  2 3  --   --

## 2022-02-02 NOTE — TELEPHONE ENCOUNTER
Patient sees Taiwo Crane wife called, patient is in the hospital, patient broke his R Femur and will be going to rehab for 2 months, they would like to know if the patient can get the rest of his gel injections while he is in rehab?     Please Advise   - [phone] [place] number

## 2022-02-02 NOTE — TELEPHONE ENCOUNTER
PATIENTS WIFE LEFT MESSAGE THAT SHE NEEDS TO C/X HIS APPT  HE FELL AND IS IN THE HOSPITAL  SHE WILL C/B TO [facility]   Carie Matute

## 2022-02-02 NOTE — PROGRESS NOTES
Progress Note - Orthopedics   Viry Carreon 80 y o  male MRN: [id number]  Unit/Bed#: W -01      Subjective:    81 y o male seen and evaluated  Reports doing okay but does endorse some pain with hip movement  No fevers/chills, CP, SOB, no paresthesias       Labs:  0   Lab Value Date/Time    HCT 24 9 (L) 02/02/2022 0602    HCT 25 9 (L) 02/01/2022 0456    HCT 27 1 (L) 01/31/2022 0510    HCT 45 9 01/07/2017 0834    HCT 43 8 07/16/2016 0752    HCT 41 0 04/07/2016 1141    HGB 7 5 (L) 02/02/2022 0602    HGB 7 6 (L) 02/01/2022 0456    HGB 8 1 (L) 01/31/2022 0510    HGB 15 3 01/07/2017 0834    HGB 14 9 07/16/2016 0752    HGB 13 4 04/07/2016 1141    INR 1 57 (H) 11/21/2021 1330    WBC 10 63 (H) 02/02/2022 0602    WBC 9 65 02/01/2022 0456    WBC 11 24 (H) 01/31/2022 0510    WBC 8 40 03/20/2018 0000    WBC 11-30 (A) 06/15/2017 1023    WBC 9 9 01/07/2017 0834    WBC 9 2 07/16/2016 0752       Meds:    Current Facility-Administered Medications:     acetaminophen (TYLENOL) tablet 975 mg, 975 mg, Oral, Q8H [address], Georges Jacques MD, 975 mg at 02/02/22 0519    albuterol (PROVENTIL HFA,VENTOLIN HFA) inhaler 2 puff, 2 puff, Inhalation, Q6H PRN, Georges Jacques MD, 2 puff at 01/30/22 1136    allopurinol (ZYLOPRIM) tablet 200 mg, 200 mg, Oral, Daily, Georges Jacques MD, 200 mg at 02/02/22 8111    ascorbic acid (VITAMIN C) tablet 500 mg, 500 mg, Oral, Daily, Georges Jacques MD, 500 mg at 02/02/22 1056    aspirin chewable tablet 81 mg, 81 mg, Oral, Daily, NAVA Blanchard, 81 mg at 02/02/22 4214    bisacodyl (DULCOLAX) rectal suppository 10 mg, 10 mg, Rectal, Daily PRN, NAVA Petersen    calcium carbonate-vitamin D (OSCAL-D) 500 mg-200 units per tablet 2 tablet, 2 tablet, Oral, HS, Georges Jacques MD, 2 tablet at 02/01/22 2321    cholecalciferol (VITAMIN D3) tablet 1,000 Units, 1,000 Units, Oral, Daily, Georges Jacques MD, 1,000 Units at 02/02/22 0830    cyanocobalamin (VITAMIN B-12) tablet 100 mcg, 100 mcg, Oral, Daily, Emerald Ferrell MD, 100 mcg at 02/02/22 5749    DULoxetine (CYMBALTA) delayed release capsule 60 mg, 60 mg, Oral, Daily, Emerald Ferrell MD, 60 mg at 02/02/22 0830    felodipine (PLENDIL) 24 hr tablet 5 mg, 5 mg, Oral, Daily, Emerald Ferrell MD, 5 mg at 02/01/22 0830    finasteride (PROSCAR) tablet 5 mg, 5 mg, Oral, Daily, Emerald Ferrell MD, 5 mg at 02/02/22 0829    fluticasone-vilanterol (BREO ELLIPTA) 200-25 MCG/INH inhaler 1 puff, 1 puff, Inhalation, Daily, Emerald Ferrell MD, 1 puff at 02/02/22 0831    furosemide (LASIX) tablet 20 mg, 20 mg, Oral, Daily, Bobo Dunham MD    furosemide (LASIX) tablet 40 mg, 40 mg, Oral, Daily, Bobo Dunham MD, 40 mg at 02/02/22 5092    gabapentin (NEURONTIN) capsule 300 mg, 300 mg, Oral, BID, Emerald Ferrell MD, 300 mg at 02/02/22 0830    glucosamine sulfate capsule 500 mg, 500 mg, Oral, BID, Emerald Ferrell MD, 500 mg at 02/02/22 0830    heparin (porcine) subcutaneous injection 5,000 Units, 5,000 Units, Subcutaneous, Q8H [address], 5,000 Units at 02/02/22 0519 **AND** Platelet count, , , Once, Emerald Ferrell MD    HYDROmorphone (DILAUDID) injection 0 2 mg, 0 2 mg, Intravenous, Q4H PRN, Emerald Ferrell MD    levalbuterol [facility]) inhalation solution 0 63 mg, 0 63 mg, Nebulization, Q8H PRN, Emerald Ferrell MD, 0 63 mg at 01/30/22 4735    [facility]) inhalation solution 1 25 mg, 1 25 mg, Nebulization, BID, Emerald Ferrell MD, 1 25 mg at 02/02/22 0736    naloxone (NARCAN) 0 04 mg/mL syringe 0 04 mg, 0 04 mg, Intravenous, Q1MIN PRN, Emerald Ferrell MD    oxyCODONE (ROXICODONE) IR tablet 2 5 mg, 2 5 mg, Oral, Q4H PRN, Emerald Ferrell MD    oxyCODONE (ROXICODONE) IR tablet 5 mg, 5 mg, Oral, Q4H PRN, Emerald Ferrell MD, 5 mg at 02/02/22 1011    polyethylene glycol (MIRALAX) packet 17 g, 17 g, Oral, Daily, NAVA Gonzalez, 17 g at 02/02/22 0830    pravastatin (PRAVACHOL) tablet 40 mg, 40 mg, Oral, Daily With Reymundo Ramirez MD, 40 mg at 02/01/22 1801    senna-docusate sodium (SENOKOT S) 8 6-50 mg per tablet 1 tablet, 1 tablet, Oral, BID, Emile Villalobos MD, 1 tablet at 02/02/22 0830    sodium chloride 0 9 % inhalation solution 3 mL, 3 mL, Nebulization, BID, Emile Villalobos MD, 3 mL at 02/02/22 0736    Blood Culture:   Lab Results   Component Value Date    BLOODCX No Growth After 5 Days  09/13/2020    BLOODCX No Growth After 5 Days  09/13/2020       Wound Culture:   No results found for: WOUNDCULT    Ins and Outs:  I/O last 24 hours: In: 2480 [P O :2480]  Out: 1300 [Urine:1300]          Physical:  Vitals:    02/02/22 0740   BP: 127/62   Pulse: 70   Resp: 18   Temp: 97 5 °F (36 4 °C)   SpO2: 97%     Musculoskeletal: right Lower Extremity  · Skin without significant edema or ecchymosis  · Compartments soft  · Dressing C/D/I  · TTP mildly at incision sites   · SILT s/s/sp/dp/t  +fhl/ehl, +ankle dorsi/plantar flexion  2+ DP pulse    Assessment:    81 y o male s/p right TFN on 1/31/22    Plan:  · WBAT RLE   · hgb 7 5 today   Continue to monitor    · PT/OT  · Pain control  · DVT ppx  · Dispo: Ortho stable     Sonido Altamirano PA-C

## 2022-02-02 NOTE — PROGRESS NOTES
Progress Note - Acute Pain Service    Jayce Stephen 80 y o  male MRN: [id number]  Unit/Bed#: W -01 Encounter: [id number]      Assessment:   Principal Problem:    Closed fracture of right femur (HCC)  Active Problems:    Chronic pain syndrome    Benign hypertension with chronic kidney disease, stage III (HCC)    Acute kidney injury superimposed on chronic kidney disease (HCC)    Chronic diastolic CHF (congestive heart failure) ([facility])    Chronic a-fib ([facility])    Lumbar radiculopathy    Hyponatremia    Stage 3a chronic kidney disease ([facility])    CAD (coronary artery disease)    Fall    Jayce Stephen is a 80 y o  male with PMH of chronic pain syndrome, CAD s/p stents, mod/severe aortic stenosis, chronic a-fib, COPD, pacemaker, sleep apnea on CPAP, smoking history who presented 1/29 after slip and fall on ice with right femur fx  Pt underwent insertion nail IM femur on 1/31 under [facility] with PENG nerve block  Acute pain service consulted for post op pain management due to history of chronic pain  He states his pain is a little worse today but he has only requested 2 oxycodone doses in the past 24 hours  I explained that this increased pain is normal as he is doing more work with PT  I recommended he request oxycodone more frequently to help with his pain which he stated has helped  Plan:   - Continue standard opioid regimen however discontinue IV hydromorphone, patient hasn't required it  - Encourage oxycodone use before activity likely to aggravate pain    [facility] will continue to follow    Pain History  Current pain location(s): RLE  Pain Scale:   6-8/10  Quality: Sharp, shooting  24 hour history: See above    Opioid requirement previous 24 hours: 10mg oxycodone    Meds/Allergies     No Known Allergies    Objective     Temp:  [97 2 °F (36 2 °C)-99 5 °F (37 5 °C)] 97 5 °F (36 4 °C)  HR:  [66-70] 70  Resp:  [18-19] 18  BP: (108-127)/(49-62) 127/62    Physical Exam  Vitals and nursing note reviewed  Constitutional:       Appearance: Normal appearance  HENT:      Head: Normocephalic and atraumatic  Right Ear: External ear normal       Left Ear: External ear normal       Nose: Nose normal       Mouth/Throat:      Mouth: Mucous membranes are moist       Pharynx: Oropharynx is clear  Eyes:      Conjunctiva/sclera: Conjunctivae normal    Cardiovascular:      Rate and Rhythm: Normal rate  Rhythm irregular  Pulses: Normal pulses  Heart sounds: Normal heart sounds  Pulmonary:      Effort: Pulmonary effort is normal       Breath sounds: Normal breath sounds  Abdominal:      General: Bowel sounds are normal       Palpations: Abdomen is soft  Musculoskeletal:         General: Normal range of motion  Cervical back: Normal range of motion and neck supple  Skin:     General: Skin is warm and dry  Neurological:      General: No focal deficit present  Mental Status: He is alert and oriented to person, place, and time  Mental status is at baseline  Psychiatric:         Mood and Affect: Mood normal          Lab Results:   Results from last 7 days   Lab Units 02/02/22  0602   WBC Thousand/uL 10 63*   HEMOGLOBIN g/dL 7 5*   HEMATOCRIT % 24 9*   PLATELETS Thousands/uL 212      Results from last 7 days   Lab Units 02/02/22  0602 02/01/22  0456 02/01/22  0456   POTASSIUM mmol/L 3 7   < > 4 2   CHLORIDE mmol/L 96*   < > 98*   CO2 mmol/L 27   < > 26   BUN mg/dL 59*   < > 52*   CREATININE mg/dL 1 86*   < > 1 87*   CALCIUM mg/dL 8 8   < > 8 7   ALK PHOS U/L  --   --  53   ALT U/L  --   --  15   AST U/L  --   --  28    < > = values in this interval not displayed  Counseling / Coordination of Care  Total floor / unit time spent today 15 minutes  Greater than 50% of total time was spent with the patient and / or family counseling and / or coordination of care  Please note that the APS provides consultative services regarding pain management only    With the exception of ketamine and epidural infusions and except when indicated, final decisions regarding starting or changing doses of analgesic medications are at the discretion of the consulting service  Off hours consultation and/or medication management is generally not available      Love Adkins MD  Acute Pain Service

## 2022-02-02 NOTE — TELEPHONE ENCOUNTER
Patient's wife and daughter given above information and verbalized understanding  May need to finish up after discharge with remaining 2 euflexxa injections if transportation cannot be worked out  Would this be okay?

## 2022-02-02 NOTE — PLAN OF CARE
Problem: Potential for Falls  Goal: Patient will remain free of falls  Description: INTERVENTIONS:  - Educate patient/family on patient safety including physical limitations  - Instruct patient to call for assistance with activity   - Consult OT/PT to assist with strengthening/mobility   - Keep Call bell within reach  - Keep bed low and locked with side rails adjusted as appropriate  - Keep care items and personal belongings within reach  - Initiate and maintain comfort rounds  - Make Fall Risk Sign visible to staff  - Apply yellow socks and bracelet for high fall risk patients  - Consider moving patient to room near nurses station  Outcome: Progressing     Problem: Prexisting or High Potential for Compromised Skin Integrity  Goal: Skin integrity is maintained or improved  Description: INTERVENTIONS:  - Identify patients at risk for skin breakdown  - Assess and monitor skin integrity  - Assess and monitor nutrition and hydration status  - Monitor labs   - Assess for incontinence   - Turn and reposition patient  - Assist with mobility/ambulation  - Relieve pressure over bony prominences  - Avoid friction and shearing  - Provide appropriate hygiene as needed including keeping skin clean and dry  - Evaluate need for skin moisturizer/barrier cream  - Collaborate with interdisciplinary team   - Patient/family teaching  - Consider wound care consult   Outcome: Progressing     Problem: MOBILITY - ADULT  Goal: Maintain or return to baseline ADL function  Description: INTERVENTIONS:  -  Assess patient's ability to carry out ADLs; assess patient's baseline for ADL function and identify physical deficits which impact ability to perform ADLs (bathing, care of mouth/teeth, toileting, grooming, dressing, etc )  - Assess/evaluate cause of self-care deficits   - Assess range of motion  - Assess patient's mobility; develop plan if impaired  - Assess patient's need for assistive devices and provide as appropriate  - Encourage maximum independence but intervene and supervise when necessary  - Involve family in performance of ADLs  - Assess for home care needs following discharge   - Consider OT consult to assist with ADL evaluation and planning for discharge  - Provide patient education as appropriate  Outcome: Progressing  Goal: Maintains/Returns to pre admission functional level  Description: INTERVENTIONS:  - Perform BMAT or MOVE assessment daily    - Set and communicate daily mobility goal to care team and patient/family/caregiver  - Collaborate with rehabilitation services on mobility goals if consulted  - Out of bed for toileting  - Record patient progress and toleration of activity level   Outcome: Progressing     Problem: Nutrition/Hydration-ADULT  Goal: Nutrient/Hydration intake appropriate for improving, restoring or maintaining nutritional needs  Description: Monitor and assess patient's nutrition/hydration status for malnutrition  Collaborate with interdisciplinary team and initiate plan and interventions as ordered  Monitor patient's weight and dietary intake as ordered or per policy  Utilize nutrition screening tool and intervene as necessary  Determine patient's food preferences and provide high-protein, high-caloric foods as appropriate       INTERVENTIONS:  - Monitor oral intake, urinary output, labs, and treatment plans  - Assess nutrition and hydration status and recommend course of action  - Evaluate amount of meals eaten  - Assist patient with eating if necessary   - Allow adequate time for meals  - Recommend/ encourage appropriate diets, oral nutritional supplements, and vitamin/mineral supplements  - Order, calculate, and assess calorie counts as needed  - Recommend, monitor, and adjust tube feedings and TPN/PPN based on assessed needs  - Assess need for intravenous fluids  - Provide specific nutrition/hydration education as appropriate  - Include patient/family/caregiver in decisions related to nutrition  Outcome: Progressing

## 2022-02-02 NOTE — PROGRESS NOTES
General Cardiology   Progress Note -  Team One   Nia Andersen 80 y o  male MRN: [id number]  Unit/Bed#: W -01 Encounter: [id number]    Assessment/ Plan    1  Acute right intertrochanteric hip fracture s/p IM nailing 1/31  POD #2  Per orthopedic surgery    2  Severe AS  FLY 0 91 cm 2, MG 41 mmHg  Normal LVEF  Outpatient follow up with Dr Rj Blacna for possible TAVR referral    3  Chronic diastolic CHF  Received 80 mg IV Lasix on 1/30 with subsequent creatinine bump  Home diuretic resumed today- Lasix 40 mg am and 20 mg pm    Does not examine particularly volume overloaded however he remains on 10L midflow cannula and CXR shows increase in vascular congestion/edema  Discussed with nephrology- ok with giving a dose of IV diuretics today  Will give 40 mg IV Lasix this afternoon (already got 40 mg PO this morning)  Needs close monitoring of I/Os and BMP in am      4  S/p dual chamber [employer]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  Battery life 6 months  Follow up with Dr Rj Blanca    5  Chronic atrial fibrillation  Xarelto on hold  Can resume when safe to do so post operatively but recommend on-going discussion regarding appropriateness of anticoagulation given his fall risk  Can follow up on this as an outpatient with primary cardiologist Dr Rj Blanca  6  CAD s/p PCI/LEOBARDO to prox RCA and mid LAD in 5/2019  No anginal symptoms  Restarted on ASA today  Continue statin  Not on beta blocker at baseline  7  HTN- controlled on current regimen- average /55    8  SHAWNEE/CKD III- baseline creatinine 1 3-1 5  1 8 this morning  Nephrology following    Subjective  C/o RLE pain  No cardiac complaints, does not feel his breathing is labored  Review of Systems   Constitutional: Negative for chills, malaise/fatigue and weight gain  Cardiovascular: Negative for chest pain, dyspnea on exertion, leg swelling, orthopnea, palpitations and syncope     Respiratory: Negative for cough, shortness of breath, sleep disturbances due to breathing and sputum production  Musculoskeletal: Positive for joint pain (right leg)  Gastrointestinal: Negative for bloating and nausea  Neurological: Negative for dizziness, light-headedness and weakness  Psychiatric/Behavioral: Negative for altered mental status  All other systems reviewed and are negative  Objective:   Vitals: Blood pressure 127/62, pulse 70, temperature 97 5 °F (36 4 °C), resp  rate 18, height 6' 1" (1 854 m), weight 105 kg (231 lb 7 7 oz), SpO2 97 %  , Body mass index is 30 54 kg/m²  ,     Systolic (97DDL), YHI:938 , Min:108 , ZBN:973     Diastolic (54PHY), UME:64, Min:49, Max:62    Intake/Output Summary (Last 24 hours) at 2/2/2022 1029  Last data filed at 2/2/2022 0910  Gross per 24 hour   Intake 2480 ml   Output 1300 ml   Net 1180 ml     Wt Readings from Last 3 Encounters:   02/02/22 105 kg (231 lb 7 7 oz)   01/24/22 131 kg (289 lb)   01/06/22 130 kg (286 lb)     Telemetry Review: N/a    Physical Exam  Vitals reviewed  Constitutional:       General: He is not in acute distress  Appearance: He is obese  Neck:      Vascular: No hepatojugular reflux or JVD  Cardiovascular:      Rate and Rhythm: Normal rate and regular rhythm  Heart sounds: Murmur heard  Systolic murmur is present  No friction rub  No gallop  Pulmonary:      Effort: No respiratory distress  Breath sounds: No rales  Comments: Decreased throughout, no significant rales  On 10L midflow cannula  Abdominal:      General: Bowel sounds are normal  There is no distension  Palpations: Abdomen is soft  Tenderness: There is no abdominal tenderness  Musculoskeletal:         General: No tenderness  Normal range of motion  Cervical back: Neck supple  Right lower leg: No edema  Left lower leg: No edema  Skin:     General: Skin is warm and dry  Capillary Refill: Capillary refill takes 2 to 3 seconds  Findings: No erythema     Neurological:      Mental Status: He is alert and oriented to person, place, and time     Psychiatric:         Mood and Affect: Mood normal      LABORATORY RESULTS      CBC with diff:   Results from last 7 days   Lab Units 02/02/22  0602 02/01/22 0456 01/31/22 2247 01/31/22  0510 01/30/22  0537 01/29/22  1016   WBC Thousand/uL 10 63* 9 65  --  11 24* 13 15* 9 20   HEMOGLOBIN g/dL 7 5* 7 6*  --  8 1* 8 9* 9 9*   HEMATOCRIT % 24 9* 25 9*  --  27 1* 30 6* 33 7*   MCV fL 84 85  --  83 84 84   PLATELETS Thousands/uL 212 190 204 226 237 238   MCH pg 25 3* 24 8*  --  24 7* 24 5* 24 8*   MCHC g/dL 30 1* 29 3*  --  29 9* 29 1* 29 4*   RDW % 16 4* 16 6*  --  17 0* 16 7* 16 7*   MPV fL 9 4 9 7 9 1 9 5 9 2 8 8*   NRBC AUTO /100 WBCs 1 0  --  0  --  0     CMP:  Results from last 7 days   Lab Units 02/02/22  0602 02/01/22 0456 01/31/22 2241 01/31/22  0510 01/30/22  0537 01/29/22  1016   POTASSIUM mmol/L 3 7 4 2 4 3 3 9 4 2 3 8   CHLORIDE mmol/L 96* 98* 97* 101 101 100   CO2 mmol/L 27 26 28 26 26 28   BUN mg/dL 59* 52* 50* 49* 41* 38*   CREATININE mg/dL 1 86* 1 87* 2 04* 2 16* 1 89* 1 70*   CALCIUM mg/dL 8 8 8 7 8 8 8 8 9 1 9 3   AST U/L  --  28  --   --   --   --    ALT U/L  --  15  --   --   --   --    ALK PHOS U/L  --  53  --   --   --   --    EGFR ml/min/1 73sq m 33 32 29 27 32 36     BMP:  Results from last 7 days   Lab Units 02/02/22  0602 02/01/22 0456 01/31/22 2241 01/31/22  0510 01/30/22  0537 01/29/22  1016   POTASSIUM mmol/L 3 7 4 2 4 3 3 9 4 2 3 8   CHLORIDE mmol/L 96* 98* 97* 101 101 100   CO2 mmol/L 27 26 28 26 26 28   BUN mg/dL 59* 52* 50* 49* 41* 38*   CREATININE mg/dL 1 86* 1 87* 2 04* 2 16* 1 89* 1 70*   CALCIUM mg/dL 8 8 8 7 8 8 8 8 9 1 9 3     Lab Results   Component Value Date    CREATININE 1 86 (H) 02/02/2022    CREATININE 1 87 (H) 02/01/2022    CREATININE 2 04 (H) 01/31/2022     Lab Results   Component Value Date    NTBNP 2,424 (H) 11/21/2021    NTBNP 1,892 (H) 09/11/2020    NTBNP 1,032 (H) 07/07/2020      Results from last 7 days   Lab Units 22  0510   MAGNESIUM mg/dL 2 3     Lipid Profile:   Lab Results   Component Value Date    CHOL 137 2017     Lab Results   Component Value Date    HDL 47 2021    HDL 38 (L) 2019    HDL 52 04/10/2019     Lab Results   Component Value Date    LDLCALC 58 2021    LDLCALC 62 2019    LDLCALC 91 04/10/2019     Lab Results   Component Value Date    TRIG 44 2021    TRIG 117 2019    TRIG 70 04/10/2019     Cardiac testing:   Results for orders placed during the hospital encounter of 20    Echo complete with contrast if indicated    Narrative  [address]  [address] [facility] [phone]    Transthoracic Echocardiogram  2D, M-mode, Doppler, and Color Doppler    Study date:  14-Sep-2020    Patient: Kathryn Vazquez  MR number: [id number]  Account number: [de-identified]  : 1940  Age: [de-identified] years  Gender: Male  Status: Inpatient  Location: Bedside  Height: 73 in  Weight: 298 3 lb  BP: 120/ 68 mmHg    Diagnoses: I48 0 - Atrial fibrillation    Sonographer:  ROGER Ambriz  Primary Physician:  Susanna Cai DO  Referring Physician:  Brad Fisher MD  Group:  [facility]  Interpreting Physician:  Brad Fisher MD    SUMMARY    LEFT VENTRICLE:  Systolic function was normal  Ejection fraction was estimated in the range of 55 % to 60 % to be 55 %  Although no diagnostic regional wall motion abnormality was identified, this possibility cannot be completely excluded on the basis of this study  Wall thickness was mildly to moderately increased  There was mild concentric hypertrophy  RIGHT VENTRICLE:  The ventricle was mildly dilated  LEFT ATRIUM:  The atrium was moderately to markedly dilated  RIGHT ATRIUM:  The atrium was mildly to moderately dilated  MITRAL VALVE:  There was moderate annular calcification  There was mild stenosis   Mean Gradient 6-7 mm of hg  There was mild regurgitation  AORTIC VALVE:  The valve was functionally bicuspid  Leaflets exhibited normal thickness and normal cuspal separation  Transaortic velocity was increased due to valvular stenosis  There was moderate to severe stenosis  Shima 1 0 cm2, peak gradient 65, mean 35 mm of hg    TRICUSPID VALVE:  There was mild to moderate regurgitation  Estimated peak PA pressure was 55 to 60 mmHg  The findings suggest moderate pulmonary hypertension  PULMONIC VALVE:  There was trace regurgitation  IVC, HEPATIC VEINS:  The inferior vena cava was mildly dilated  The respirophasic change in diameter was more than 50%  COMPARISONS:  Comparison was made with the previous study of 08-Apr-2019  Aortic stenosis has worsened  Pulmonary artery pressure has increased  HISTORY: PRIOR HISTORY: A  Flutter,A Fib ,chronic kidney disease,gout,heart failure,HTN,Pacemaker,pulmonary emphysema,sleep apnea,Angioplasty with stent placement,CAD,COPD,AAA  PROCEDURE: The procedure was performed at the bedside  This was a routine study  The transthoracic approach was used  The study included complete 2D imaging, M-mode, complete spectral Doppler, and color Doppler  The heart rate was 76 bpm,  at the start of the study  Images were obtained from the parasternal, apical, subcostal, and suprasternal notch acoustic windows  Intravenous contrast ( 0 4ml Definity in NSS) was administered to opacify the left ventricle and enhance  Doppler signals  Echocardiographic views were limited due to poor acoustic window availability, decreased penetration, and lung interference  This was a technically difficult study  LEFT VENTRICLE: Size was normal  Systolic function was normal  Ejection fraction was estimated in the range of 55 % to 60 % to be 55 %  Although no diagnostic regional wall motion abnormality was identified, this possibility cannot be  completely excluded on the basis of this study   Wall thickness was mildly to moderately increased  There was mild concentric hypertrophy  DOPPLER: The study was not technically sufficient to allow evaluation of LV diastolic function  RIGHT VENTRICLE: The ventricle was mildly dilated  Systolic function was normal     LEFT ATRIUM: The atrium was moderately to markedly dilated  RIGHT ATRIUM: The atrium was mildly to moderately dilated  A pacing wire was present  MITRAL VALVE: There was moderate annular calcification  There was mildly reduced leaflet separation  DOPPLER: Transmitral velocity was minimally increased  There was mild stenosis  Mean Gradient 6-7 mm of hg There was mild regurgitation  AORTIC VALVE: The valve was functionally bicuspid  Leaflets exhibited normal thickness and normal cuspal separation  DOPPLER: Transaortic velocity was increased due to valvular stenosis  There was moderate to severe stenosis  Shima 1 0 cm2,  peak gradient 65, mean 35 mm of hg There was no regurgitation  TRICUSPID VALVE: DOPPLER: There was mild to moderate regurgitation  Estimated peak PA pressure was 55 to 60 mmHg  The findings suggest moderate pulmonary hypertension  PULMONIC VALVE: DOPPLER: There was trace regurgitation  PERICARDIUM: There was no thickening or calcification  There was no pericardial effusion  AORTA: The root exhibited normal size  SYSTEMIC VEINS: IVC: The inferior vena cava was mildly dilated  The respirophasic change in diameter was more than 50%  SYSTEM MEASUREMENT TABLES    2D mode  AoR Diam 2D: 3 1 cm  LA Diam (2D): 5 3 cm  LA/Ao (2D): 1 71  FS (2D Teich): 25 7 %  IVSd (2D): 1 3 cm  LVDEV: 130 cmï¾³  LVESV: 64 7 cmï¾³  LVIDd(2D): 5 21 cm  LVISd (2D): 3 87 cm  LVOT Area 2D: 3 46 cmï¾²  LVPWd (2D): 1 33 cm  SV (Teich): 65 3 cmï¾³    Apical four chamber  LVEF A4C: 54 %    Unspecified Scan Mode  SHIMA Cont Eq (Peak Vitaly): 1 03 cmï¾²  LVOT Diam : 2 1 cm  LVOT Vmax: 1160 mm/s  LVOT Vmax; Mean: 1160 mm/s  Peak Grad ; Mean: 5 mm[Hg]  MV Peak A Vitaly: 434 mm/s  MV Peak E Vitaly  Mean: 1680 mm/s  MVA (PHT): 3 01 cmï¾²  PHT: 73 ms  Max P mm[Hg]  V Max: 2920 mm/s  Vmax: 3410 mm/s  TAPSE: 1 5 cm    [employer] Accredited Echocardiography Laboratory    Prepared and electronically signed by    Marielle Syed MD  Signed 14-Sep-2020 13:14:06    Meds/Allergies   all current active meds have been reviewed and current meds:   Current Facility-Administered Medications   Medication Dose Route Frequency    acetaminophen (TYLENOL) tablet 975 mg  975 mg Oral Q8H [address]    albuterol (PROVENTIL HFA,VENTOLIN HFA) inhaler 2 puff  2 puff Inhalation Q6H PRN    allopurinol (ZYLOPRIM) tablet 200 mg  200 mg Oral Daily    ascorbic acid (VITAMIN C) tablet 500 mg  500 mg Oral Daily    aspirin chewable tablet 81 mg  81 mg Oral Daily    bisacodyl (DULCOLAX) rectal suppository 10 mg  10 mg Rectal Daily PRN    calcium carbonate-vitamin D (OSCAL-D) 500 mg-200 units per tablet 2 tablet  2 tablet Oral HS    cholecalciferol (VITAMIN D3) tablet 1,000 Units  1,000 Units Oral Daily    cyanocobalamin (VITAMIN B-12) tablet 100 mcg  100 mcg Oral Daily    DULoxetine (CYMBALTA) delayed release capsule 60 mg  60 mg Oral Daily    felodipine (PLENDIL) 24 hr tablet 5 mg  5 mg Oral Daily    finasteride (PROSCAR) tablet 5 mg  5 mg Oral Daily    fluticasone-vilanterol (BREO ELLIPTA) 200-25 MCG/INH inhaler 1 puff  1 puff Inhalation Daily    furosemide (LASIX) tablet 20 mg  20 mg Oral Daily    furosemide (LASIX) tablet 40 mg  40 mg Oral Daily    gabapentin (NEURONTIN) capsule 300 mg  300 mg Oral BID    glucosamine sulfate capsule 500 mg  500 mg Oral BID    heparin (porcine) subcutaneous injection 5,000 Units  5,000 Units Subcutaneous Q8H [address]    HYDROmorphone (DILAUDID) injection 0 2 mg  0 2 mg Intravenous Q4H PRN    levalbuterol (XOPENEX) inhalation solution 0 63 mg  0 63 mg Nebulization Q8H PRN    levalbuterol (XOPENEX) inhalation solution 1 25 mg  1 25 mg Nebulization BID    naloxone (NARCAN) 0 04 mg/mL syringe 0 04 mg  0 04 mg Intravenous Q1MIN PRN    oxyCODONE (ROXICODONE) IR tablet 2 5 mg  2 5 mg Oral Q4H PRN    oxyCODONE (ROXICODONE) IR tablet 5 mg  5 mg Oral Q4H PRN    polyethylene glycol (MIRALAX) packet 17 g  17 g Oral Daily    pravastatin (PRAVACHOL) tablet 40 mg  40 mg Oral Daily With Dinner    senna-docusate sodium (SENOKOT S) 8 6-50 mg per tablet 1 tablet  1 tablet Oral BID    sodium chloride 0 9 % inhalation solution 3 mL  3 mL Nebulization BID     Medications Prior to Admission   Medication    albuterol (Ventolin HFA) 90 mcg/act inhaler    allopurinol (ZYLOPRIM) 100 mg tablet    ascorbic acid (VITAMIN C) 500 mg tablet    B Complex Vitamins (B COMPLEX 1 PO)    Biotin (BIOTIN 5000) 5 MG CAPS    calcium carbonate-vitamin D (OSCAL-D) 500 mg-200 units per tablet    cholecalciferol (VITAMIN D3) 1,000 units tablet    clopidogrel (PLAVIX) 75 mg tablet    colchicine (COLCRYS) 0 6 mg tablet    Cranberry 1000 MG CAPS    cyanocobalamin 1000 MCG tablet    dextromethorphan-guaiFENesin (ROBITUSSIN DM)  mg/5 mL syrup    docusate sodium (COLACE) 100 mg capsule    DULoxetine (CYMBALTA) 60 mg delayed release capsule    felodipine (PLENDIL) 5 mg 24 hr tablet    finasteride (PROSCAR) 5 mg tablet    Flaxseed, Linseed, (EQL FLAX SEED OIL) 1000 MG CAPS    fluocinonide (LIDEX) 0 05 % cream    furosemide (LASIX) 40 mg tablet    gabapentin (Neurontin) 300 mg capsule    glucosamine-chondroitin 500-400 MG tablet    rivaroxaban (XARELTO) 15 mg tablet    simvastatin (ZOCOR) 20 mg tablet    spironolactone (ALDACTONE) 25 mg tablet    Farxiga 5 MG TABS    fluticasone-umeclidinium-vilanterol (Trelegy Ellipta) 200-62 5-25 MCG/INH AEPB inhaler     Counseling / Coordination of Care  Total floor / unit time spent today 20 minutes  Greater than 50% of total time was spent with the patient and / or family counseling and / or coordination of care        ** Please Note: Dragon 360 Dictation voice to text software may have been used in the creation of this document   **

## 2022-02-02 NOTE — PROGRESS NOTES
Progress Note - Jas Alvarez 80 y o  male MRN: [id number]    Unit/Bed#: W -01 Encounter: [id number]      Assessment/Plan:  1  Ambulatory dysfunction s/p fall  · Two recent falls - h/o lumbar radiculopathy - ?gabapentin contributing? · PT OT following STR when medically stable  Fall precautions  2  Deconditioning/frailty  · Optimize diet, hydration, mobility for healing  · GFR 33, na 130 - monitor hyponatremia closely (on fluid restriction)  Avoid nephrotoxins, renally dose meds  · Cont monitor I&O, nutrition as needed for hypoalbuminemia   · Monitor ss infection, dehydration, dvt, skin breakdown  3   Forgetfulness/delirium risk  · A&O x3, mild forgetfulness   · Cont frequent reminders, close monitoring for safety, delirium precautions as previously written  · Recommend MOCA check by OT later in hospital/rehab course, prior to going home   4  Acute pain d/t trauma  · S/p right femur im nail 1/31  · APS following, pt reporting pain in lower right leg, controlled in thigh  · Cont w/tylenol, gabapentin, prn oxyIR  · Using occ oxyIR 5mg - encouraged to use prior to activity  · Monitor constipation  · Cont nonpharm methods of pain control  5  Vision/hearing impairment  · Cont supportive environment:  Adequate lighting, speak clearly and toward patient  Assist as needed for adls  6   Constipation  · Chronic for patient  Recommend adding colace, dietary fiber  Cont senna-s, miralax  Consider adding supp tomorrow if no results today  Subjective:   Patient seen for geriatrics follow up  He is reporting right calf/shin pain and constipation  He states any time he moves leg it hurts  He states incisional area is ok  He is utilizing tylenol and oxyIR  Pt reports chronic constipation that is only somewhat relieved with bid colace, daily miralax  His LBM was Friday  Denies n/v  Appetite ok  Pt states sleep was ok  Denies cp/sob/cough  Denies gi/gu distress    Of note, pt's o2 out of nose on arrival, sat was 79%  Improved to 91% w/o2 in nose  Objective:     Vitals: Blood pressure 127/62, pulse 70, temperature 97 5 °F (36 4 °C), resp  rate 18, height 6' 1" (1 854 m), weight 105 kg (231 lb 7 7 oz), SpO2 97 %  ,Body mass index is 30 54 kg/m²  Intake/Output Summary (Last 24 hours) at 2/2/2022 0937  Last data filed at 2/2/2022 0910  Gross per 24 hour   Intake 2480 ml   Output 1300 ml   Net 1180 ml       Physical Exam:   General:  A&O x3, no distress  Cards:  RRR, +murmur, no gallop/rub  Pulm:  Norm effort/no distress, cta, no w/r/r  Supp o2 on  Abd:  Soft, nt, nd, +bs  MS:  Norm ROM, no focal weakness  Ext:  W/d, no edema  Unable to visualize dressing d/t location       Invasive Devices  Report    Peripheral Intravenous Line            Peripheral IV 01/31/22 Right Hand 1 day                Lab, Imaging and other studies: I have personally reviewed pertinent reports      VTE Pharmacologic Prophylaxis: Heparin

## 2022-02-03 LAB
ANION GAP SERPL CALCULATED.3IONS-SCNC: 8 MMOL/L (ref 4–13)
ANISOCYTOSIS BLD QL SMEAR: PRESENT
BASOPHILS # BLD MANUAL: 0.16 THOUSAND/UL (ref 0–0.1)
BASOPHILS NFR MAR MANUAL: 2 % (ref 0–1)
BUN SERPL-MCNC: 51 MG/DL (ref 5–25)
CALCIUM SERPL-MCNC: 8.9 MG/DL (ref 8.3–10.1)
CHLORIDE SERPL-SCNC: 101 MMOL/L (ref 100–108)
CO2 SERPL-SCNC: 28 MMOL/L (ref 21–32)
CORTIS SERPL-MCNC: 8.7 UG/DL
CREAT SERPL-MCNC: 1.59 MG/DL (ref 0.6–1.3)
EOSINOPHIL # BLD MANUAL: 0.25 THOUSAND/UL (ref 0–0.4)
EOSINOPHIL NFR BLD MANUAL: 3 % (ref 0–6)
ERYTHROCYTE [DISTWIDTH] IN BLOOD BY AUTOMATED COUNT: 17.1 % (ref 11.6–15.1)
FERRITIN SERPL-MCNC: 108 NG/ML (ref 8–388)
FLUAV RNA RESP QL NAA+PROBE: NEGATIVE
FLUBV RNA RESP QL NAA+PROBE: NEGATIVE
GFR SERPL CREATININE-BSD FRML MDRD: 40 ML/MIN/1.73SQ M
GLUCOSE SERPL-MCNC: 131 MG/DL (ref 65–140)
HCT VFR BLD AUTO: 24.8 % (ref 36.5–49.3)
HGB BLD-MCNC: 7.4 G/DL (ref 12–17)
HYPERCHROMIA BLD QL SMEAR: PRESENT
IRON SATN MFR SERPL: 11 % (ref 20–50)
IRON SERPL-MCNC: 32 UG/DL (ref 65–175)
LYMPHOCYTES # BLD AUTO: 0.49 THOUSAND/UL (ref 0.6–4.47)
LYMPHOCYTES # BLD AUTO: 6 % (ref 14–44)
MAGNESIUM SERPL-MCNC: 2.3 MG/DL (ref 1.6–2.6)
MCH RBC QN AUTO: 25.3 PG (ref 26.8–34.3)
MCHC RBC AUTO-ENTMCNC: 29.8 G/DL (ref 31.4–37.4)
MCV RBC AUTO: 85 FL (ref 82–98)
MONOCYTES # BLD AUTO: 0.41 THOUSAND/UL (ref 0–1.22)
MONOCYTES NFR BLD: 5 % (ref 4–12)
NEUTROPHILS # BLD MANUAL: 6.87 THOUSAND/UL (ref 1.85–7.62)
NEUTS SEG NFR BLD AUTO: 84 % (ref 43–75)
OSMOLALITY UR/SERPL-RTO: 311 MMOL/KG (ref 282–298)
PLATELET # BLD AUTO: 208 THOUSANDS/UL (ref 149–390)
PLATELET BLD QL SMEAR: ADEQUATE
PMV BLD AUTO: 9.4 FL (ref 8.9–12.7)
POLYCHROMASIA BLD QL SMEAR: PRESENT
POTASSIUM SERPL-SCNC: 3.7 MMOL/L (ref 3.5–5.3)
RBC # BLD AUTO: 2.92 MILLION/UL (ref 3.88–5.62)
RBC MORPH BLD: PRESENT
RSV RNA RESP QL NAA+PROBE: NEGATIVE
SARS-COV-2 RNA RESP QL NAA+PROBE: NEGATIVE
SODIUM SERPL-SCNC: 137 MMOL/L (ref 136–145)
T4 FREE SERPL-MCNC: 1.16 NG/DL (ref 0.76–1.46)
TIBC SERPL-MCNC: 301 UG/DL (ref 250–450)
TSH SERPL DL<=0.05 MIU/L-ACNC: 2.25 UIU/ML (ref 0.36–3.74)
URATE SERPL-MCNC: 7.3 MG/DL (ref 4.2–8)
WBC # BLD AUTO: 8.18 THOUSAND/UL (ref 4.31–10.16)

## 2022-02-03 PROCEDURE — 99232 SBSQ HOSP IP/OBS MODERATE 35: CPT | Performed by: INTERNAL MEDICINE

## 2022-02-03 PROCEDURE — 99233 SBSQ HOSP IP/OBS HIGH 50: CPT | Performed by: SURGERY

## 2022-02-03 PROCEDURE — 99024 POSTOP FOLLOW-UP VISIT: CPT | Performed by: PHYSICIAN ASSISTANT

## 2022-02-03 PROCEDURE — 82533 TOTAL CORTISOL: CPT | Performed by: INTERNAL MEDICINE

## 2022-02-03 PROCEDURE — 84439 ASSAY OF FREE THYROXINE: CPT | Performed by: INTERNAL MEDICINE

## 2022-02-03 PROCEDURE — 94760 N-INVAS EAR/PLS OXIMETRY 1: CPT

## 2022-02-03 PROCEDURE — 94668 MNPJ CHEST WALL SBSQ: CPT

## 2022-02-03 PROCEDURE — 94640 AIRWAY INHALATION TREATMENT: CPT

## 2022-02-03 PROCEDURE — 84550 ASSAY OF BLOOD/URIC ACID: CPT | Performed by: INTERNAL MEDICINE

## 2022-02-03 PROCEDURE — 83930 ASSAY OF BLOOD OSMOLALITY: CPT | Performed by: INTERNAL MEDICINE

## 2022-02-03 PROCEDURE — 83735 ASSAY OF MAGNESIUM: CPT | Performed by: INTERNAL MEDICINE

## 2022-02-03 PROCEDURE — 83550 IRON BINDING TEST: CPT | Performed by: INTERNAL MEDICINE

## 2022-02-03 PROCEDURE — 85027 COMPLETE CBC AUTOMATED: CPT | Performed by: STUDENT IN AN ORGANIZED HEALTH CARE EDUCATION/TRAINING PROGRAM

## 2022-02-03 PROCEDURE — 82728 ASSAY OF FERRITIN: CPT | Performed by: INTERNAL MEDICINE

## 2022-02-03 PROCEDURE — 97110 THERAPEUTIC EXERCISES: CPT

## 2022-02-03 PROCEDURE — 83540 ASSAY OF IRON: CPT | Performed by: INTERNAL MEDICINE

## 2022-02-03 PROCEDURE — 93970 EXTREMITY STUDY: CPT | Performed by: SURGERY

## 2022-02-03 PROCEDURE — 97530 THERAPEUTIC ACTIVITIES: CPT

## 2022-02-03 PROCEDURE — 84443 ASSAY THYROID STIM HORMONE: CPT | Performed by: INTERNAL MEDICINE

## 2022-02-03 PROCEDURE — 85007 BL SMEAR W/DIFF WBC COUNT: CPT | Performed by: STUDENT IN AN ORGANIZED HEALTH CARE EDUCATION/TRAINING PROGRAM

## 2022-02-03 PROCEDURE — 80048 BASIC METABOLIC PNL TOTAL CA: CPT | Performed by: STUDENT IN AN ORGANIZED HEALTH CARE EDUCATION/TRAINING PROGRAM

## 2022-02-03 PROCEDURE — 0241U HB NFCT DS VIR RESP RNA 4 TRGT: CPT | Performed by: NURSE PRACTITIONER

## 2022-02-03 RX ORDER — PREDNISONE 20 MG/1
60 TABLET ORAL DAILY
Status: COMPLETED | OUTPATIENT
Start: 2022-02-03 | End: 2022-02-07

## 2022-02-03 RX ORDER — FUROSEMIDE 10 MG/ML
40 INJECTION INTRAMUSCULAR; INTRAVENOUS
Status: COMPLETED | OUTPATIENT
Start: 2022-02-03 | End: 2022-02-04

## 2022-02-03 RX ORDER — GUAIFENESIN 600 MG
600 TABLET, EXTENDED RELEASE 12 HR ORAL EVERY 12 HOURS SCHEDULED
Status: DISCONTINUED | OUTPATIENT
Start: 2022-02-03 | End: 2022-02-07 | Stop reason: HOSPADM

## 2022-02-03 RX ORDER — BISACODYL 10 MG
10 SUPPOSITORY, RECTAL RECTAL DAILY
Status: DISCONTINUED | OUTPATIENT
Start: 2022-02-03 | End: 2022-02-07 | Stop reason: HOSPADM

## 2022-02-03 RX ADMIN — POLYETHYLENE GLYCOL 3350 17 G: 17 POWDER, FOR SOLUTION ORAL at 09:08

## 2022-02-03 RX ADMIN — FUROSEMIDE 40 MG: 10 INJECTION, SOLUTION INTRAMUSCULAR; INTRAVENOUS at 15:18

## 2022-02-03 RX ADMIN — OXYCODONE HYDROCHLORIDE 2.5 MG: 5 TABLET ORAL at 05:58

## 2022-02-03 RX ADMIN — LEVALBUTEROL HYDROCHLORIDE 1.25 MG: 1.25 SOLUTION, CONCENTRATE RESPIRATORY (INHALATION) at 19:19

## 2022-02-03 RX ADMIN — ASPIRIN 81 MG CHEWABLE TABLET 81 MG: 81 TABLET CHEWABLE at 09:09

## 2022-02-03 RX ADMIN — OXYCODONE HYDROCHLORIDE AND ACETAMINOPHEN 500 MG: 500 TABLET ORAL at 09:09

## 2022-02-03 RX ADMIN — PRAVASTATIN SODIUM 40 MG: 40 TABLET ORAL at 15:17

## 2022-02-03 RX ADMIN — Medication 500 MG: at 15:17

## 2022-02-03 RX ADMIN — ISODIUM CHLORIDE 3 ML: 0.03 SOLUTION RESPIRATORY (INHALATION) at 19:19

## 2022-02-03 RX ADMIN — GABAPENTIN 300 MG: 300 CAPSULE ORAL at 09:09

## 2022-02-03 RX ADMIN — ISODIUM CHLORIDE 3 ML: 0.03 SOLUTION RESPIRATORY (INHALATION) at 07:33

## 2022-02-03 RX ADMIN — ACETAMINOPHEN 975 MG: 325 TABLET, FILM COATED ORAL at 15:18

## 2022-02-03 RX ADMIN — ACETAMINOPHEN 975 MG: 325 TABLET, FILM COATED ORAL at 21:19

## 2022-02-03 RX ADMIN — ACETAMINOPHEN 975 MG: 325 TABLET, FILM COATED ORAL at 05:58

## 2022-02-03 RX ADMIN — FUROSEMIDE 40 MG: 10 INJECTION, SOLUTION INTRAMUSCULAR; INTRAVENOUS at 09:08

## 2022-02-03 RX ADMIN — BISACODYL 10 MG: 10 SUPPOSITORY RECTAL at 09:08

## 2022-02-03 RX ADMIN — ALLOPURINOL 200 MG: 100 TABLET ORAL at 09:09

## 2022-02-03 RX ADMIN — Medication 2 TABLET: at 21:19

## 2022-02-03 RX ADMIN — GABAPENTIN 300 MG: 300 CAPSULE ORAL at 15:17

## 2022-02-03 RX ADMIN — SENNOSIDES AND DOCUSATE SODIUM 1 TABLET: 50; 8.6 TABLET ORAL at 09:09

## 2022-02-03 RX ADMIN — FINASTERIDE 5 MG: 5 TABLET, FILM COATED ORAL at 09:09

## 2022-02-03 RX ADMIN — Medication 100 MCG: at 09:09

## 2022-02-03 RX ADMIN — FLUTICASONE FUROATE AND VILANTEROL TRIFENATATE 1 PUFF: 200; 25 POWDER RESPIRATORY (INHALATION) at 09:16

## 2022-02-03 RX ADMIN — Medication 500 MG: at 09:08

## 2022-02-03 RX ADMIN — PREDNISONE 60 MG: 20 TABLET ORAL at 10:58

## 2022-02-03 RX ADMIN — Medication 1000 UNITS: at 09:09

## 2022-02-03 RX ADMIN — GUAIFENESIN 600 MG: 600 TABLET, EXTENDED RELEASE ORAL at 21:19

## 2022-02-03 RX ADMIN — SENNOSIDES AND DOCUSATE SODIUM 1 TABLET: 50; 8.6 TABLET ORAL at 15:17

## 2022-02-03 RX ADMIN — LEVALBUTEROL HYDROCHLORIDE 1.25 MG: 1.25 SOLUTION, CONCENTRATE RESPIRATORY (INHALATION) at 07:33

## 2022-02-03 RX ADMIN — GUAIFENESIN 600 MG: 600 TABLET, EXTENDED RELEASE ORAL at 10:58

## 2022-02-03 RX ADMIN — FELODIPINE 5 MG: 2.5 TABLET, FILM COATED, EXTENDED RELEASE ORAL at 09:08

## 2022-02-03 RX ADMIN — DULOXETINE HYDROCHLORIDE 60 MG: 60 CAPSULE, DELAYED RELEASE ORAL at 09:09

## 2022-02-03 RX ADMIN — RIVAROXABAN 15 MG: 15 TABLET, FILM COATED ORAL at 21:19

## 2022-02-03 NOTE — PROGRESS NOTES
General Cardiology   Progress Note -  [facility]   Jason Grant 80 y o  male MRN: [id number]  Unit/Bed#: W -01 Encounter: [id number]    Assessment/ Plan    1  Acute right intertrochanteric hip fracture s/p IM nailing 1/31  POD #3  Per orthopedic surgery    2  Severe AS  FLY 0 91 cm 2, MG 41 mmHg  Normal LVEF  Outpatient follow up with Dr Sarai Carrasco for possible TAVR referral    3  Chronic diastolic CHF  Home diuretic- Lasix 40 mg am and 20 mg pm    Does not examine particularly volume overloaded however he remains on 10L midflow cannula and CXR yesterday showed increase in pulmonary edema  He got 40 mg IV Lasix yesterday afternoon with limited output documented however patient tells me he filled close to 1 5 urinals overnight  Start furosemide 40 mg IV BID-Needs close monitoring of I/Os   Weights inaccurate due to use of bed scale   BMP in am      4  S/p dual chamber [employer] PPM  Battery life 6 months  Follow up with Dr Sarai Carrasco    5  Chronic atrial fibrillation  Xarelto restarted yesterday    6  CAD s/p PCI/LEOBARDO to prox RCA and mid LAD in 5/2019  No anginal symptoms  Continue statin and ASA  7  HTN- controlled on current regimen- average /61    8  SHAWNEE/CKD III- baseline creatinine 1 3-1 5  1 59 this morning which is improved since yesterday with diuresis  9  Anemia- hemoglobin stable over past several days, 7 4 this morning  Subjective  "I feel better"  C/o RLE pain but tolerable  No cardiac complaints  Review of Systems   Constitutional: Negative for chills, malaise/fatigue and weight gain  Cardiovascular: Negative for chest pain, dyspnea on exertion, leg swelling, orthopnea, palpitations and syncope  Respiratory: Negative for cough, shortness of breath, sleep disturbances due to breathing and sputum production  Musculoskeletal: Positive for joint pain (right leg)  Gastrointestinal: Negative for bloating and nausea     Neurological: Negative for dizziness, light-headedness and weakness  Psychiatric/Behavioral: Negative for altered mental status  All other systems reviewed and are negative  Objective:   Vitals: Blood pressure 115/63, pulse 70, temperature (!) 97 4 °F (36 3 °C), resp  rate 19, height 6' 1" (1 854 m), weight 105 kg (231 lb 7 7 oz), SpO2 97 %  , Body mass index is 30 54 kg/m²  ,     Systolic (65PCN), XEP:585 , Min:111 , IMI:311     Diastolic (82OGT), VRV:05, Min:57, Max:63      Intake/Output Summary (Last 24 hours) at 2/3/2022 0835  Last data filed at 2/2/2022 2243  Gross per 24 hour   Intake 780 ml   Output 1325 ml   Net -545 ml     Wt Readings from Last 3 Encounters:   02/02/22 105 kg (231 lb 7 7 oz)   01/24/22 131 kg (289 lb)   01/06/22 130 kg (286 lb)     Telemetry Review: N/a    Physical Exam  Vitals reviewed  Constitutional:       General: He is not in acute distress  Appearance: He is obese  Neck:      Vascular: No hepatojugular reflux or JVD  Cardiovascular:      Rate and Rhythm: Normal rate and regular rhythm  Heart sounds: Murmur heard  Systolic murmur is present  No friction rub  No gallop  Pulmonary:      Effort: No respiratory distress  Breath sounds: No rales  Comments: Decreased throughout, no significant rales  On 10L midflow cannula  Abdominal:      General: Bowel sounds are normal  There is no distension  Palpations: Abdomen is soft  Tenderness: There is no abdominal tenderness  Musculoskeletal:         General: No tenderness  Normal range of motion  Cervical back: Neck supple  Right lower leg: No edema  Left lower leg: No edema  Skin:     General: Skin is warm and dry  Capillary Refill: Capillary refill takes 2 to 3 seconds  Findings: No erythema  Neurological:      Mental Status: He is alert and oriented to person, place, and time     Psychiatric:         Mood and Affect: Mood normal      LABORATORY RESULTS      CBC with diff:   Results from last 7 days   Lab Units 02/03/22  0535 02/02/22  0602 02/01/22 0456 01/31/22 2247 01/31/22  0510 01/30/22  0537 01/29/22  1016   WBC Thousand/uL 8 18 10 63* 9 65  --  11 24* 13 15* 9 20   HEMOGLOBIN g/dL 7 4* 7 5* 7 6*  --  8 1* 8 9* 9 9*   HEMATOCRIT % 24 8* 24 9* 25 9*  --  27 1* 30 6* 33 7*   MCV fL 85 84 85  --  83 84 84   PLATELETS Thousands/uL 208 212 190 204 226 237 238   MCH pg 25 3* 25 3* 24 8*  --  24 7* 24 5* 24 8*   MCHC g/dL 29 8* 30 1* 29 3*  --  29 9* 29 1* 29 4*   RDW % 17 1* 16 4* 16 6*  --  17 0* 16 7* 16 7*   MPV fL 9 4 9 4 9 7 9 1 9 5 9 2 8 8*   NRBC AUTO /100 WBCs  --  1 0  --  0  --  0     CMP:  Results from last 7 days   Lab Units 02/03/22  0535 02/02/22  0602 02/01/22 0456 01/31/22 2241 01/31/22  0510 01/30/22  0537 01/29/22  1016   POTASSIUM mmol/L 3 7 3 7 4 2 4 3 3 9 4 2 3 8   CHLORIDE mmol/L 101 96* 98* 97* 101 101 100   CO2 mmol/L 28 27 26 28 26 26 28   BUN mg/dL 51* 59* 52* 50* 49* 41* 38*   CREATININE mg/dL 1 59* 1 86* 1 87* 2 04* 2 16* 1 89* 1 70*   CALCIUM mg/dL 8 9 8 8 8 7 8 8 8 8 9 1 9 3   AST U/L  --   --  28  --   --   --   --    ALT U/L  --   --  15  --   --   --   --    ALK PHOS U/L  --   --  53  --   --   --   --    EGFR ml/min/1 73sq m 40 33 32 29 27 32 36     BMP:  Results from last 7 days   Lab Units 02/03/22  0535 02/02/22  0602 02/01/22 0456 01/31/22 2241 01/31/22  0510 01/30/22  0537 01/29/22  1016   POTASSIUM mmol/L 3 7 3 7 4 2 4 3 3 9 4 2 3 8   CHLORIDE mmol/L 101 96* 98* 97* 101 101 100   CO2 mmol/L 28 27 26 28 26 26 28   BUN mg/dL 51* 59* 52* 50* 49* 41* 38*   CREATININE mg/dL 1 59* 1 86* 1 87* 2 04* 2 16* 1 89* 1 70*   CALCIUM mg/dL 8 9 8 8 8 7 8 8 8 8 9 1 9 3     Lab Results   Component Value Date    CREATININE 1 59 (H) 02/03/2022    CREATININE 1 86 (H) 02/02/2022    CREATININE 1 87 (H) 02/01/2022     Lab Results   Component Value Date    NTBNP 2,424 (H) 11/21/2021    NTBNP 1,892 (H) 09/11/2020    NTBNP 1,032 (H) 07/07/2020      Results from last 7 days   Lab Units 02/03/22  0535 22  0510   MAGNESIUM mg/dL 2 3 2 3     Lipid Profile:   Lab Results   Component Value Date    CHOL 137 2017     Lab Results   Component Value Date    HDL 47 2021    HDL 38 (L) 2019    HDL 52 04/10/2019     Lab Results   Component Value Date    LDLCALC 58 2021    LDLCALC 62 2019    LDLCALC 91 04/10/2019     Lab Results   Component Value Date    TRIG 44 2021    TRIG 117 2019    TRIG 70 04/10/2019     Cardiac testing:   Results for orders placed during the hospital encounter of 20    Echo complete with contrast if indicated    Narrative  [address]  [address] [facility] [place] [address] [phone]    Transthoracic Echocardiogram  2D, M-mode, Doppler, and Color Doppler    Study date:  14-Sep-2020    Patient: Raz Cárdenas  MR number: [id number]  Account number: [de-identified]  : 1940  Age: [de-identified] years  Gender: Male  Status: Inpatient  Location: Bedside  Height: 73 in  Weight: 298 3 lb  BP: 120/ 68 mmHg    Diagnoses: I48 0 - Atrial fibrillation    Sonographer:  ROGER Cotton  Primary Physician:  Jose L Portillo DO  Referring Physician:  Flash Araya MD  Group:  [facility]  Interpreting Physician:  Flash Araya MD    SUMMARY    LEFT VENTRICLE:  Systolic function was normal  Ejection fraction was estimated in the range of 55 % to 60 % to be 55 %  Although no diagnostic regional wall motion abnormality was identified, this possibility cannot be completely excluded on the basis of this study  Wall thickness was mildly to moderately increased  There was mild concentric hypertrophy  RIGHT VENTRICLE:  The ventricle was mildly dilated  LEFT ATRIUM:  The atrium was moderately to markedly dilated  RIGHT ATRIUM:  The atrium was mildly to moderately dilated  MITRAL VALVE:  There was moderate annular calcification  There was mild stenosis   Mean Gradient 6-7 mm of hg  There was mild regurgitation  AORTIC VALVE:  The valve was functionally bicuspid  Leaflets exhibited normal thickness and normal cuspal separation  Transaortic velocity was increased due to valvular stenosis  There was moderate to severe stenosis  Shima 1 0 cm2, peak gradient 65, mean 35 mm of hg    TRICUSPID VALVE:  There was mild to moderate regurgitation  Estimated peak PA pressure was 55 to 60 mmHg  The findings suggest moderate pulmonary hypertension  PULMONIC VALVE:  There was trace regurgitation  IVC, HEPATIC VEINS:  The inferior vena cava was mildly dilated  The respirophasic change in diameter was more than 50%  COMPARISONS:  Comparison was made with the previous study of 08-Apr-2019  Aortic stenosis has worsened  Pulmonary artery pressure has increased  HISTORY: PRIOR HISTORY: A  Flutter,A Fib ,chronic kidney disease,gout,heart failure,HTN,Pacemaker,pulmonary emphysema,sleep apnea,Angioplasty with stent placement,CAD,COPD,AAA  PROCEDURE: The procedure was performed at the bedside  This was a routine study  The transthoracic approach was used  The study included complete 2D imaging, M-mode, complete spectral Doppler, and color Doppler  The heart rate was 76 bpm,  at the start of the study  Images were obtained from the parasternal, apical, subcostal, and suprasternal notch acoustic windows  Intravenous contrast ( 0 4ml Definity in NSS) was administered to opacify the left ventricle and enhance  Doppler signals  Echocardiographic views were limited due to poor acoustic window availability, decreased penetration, and lung interference  This was a technically difficult study  LEFT VENTRICLE: Size was normal  Systolic function was normal  Ejection fraction was estimated in the range of 55 % to 60 % to be 55 %  Although no diagnostic regional wall motion abnormality was identified, this possibility cannot be  completely excluded on the basis of this study   Wall thickness was mildly to moderately increased  There was mild concentric hypertrophy  DOPPLER: The study was not technically sufficient to allow evaluation of LV diastolic function  RIGHT VENTRICLE: The ventricle was mildly dilated  Systolic function was normal     LEFT ATRIUM: The atrium was moderately to markedly dilated  RIGHT ATRIUM: The atrium was mildly to moderately dilated  A pacing wire was present  MITRAL VALVE: There was moderate annular calcification  There was mildly reduced leaflet separation  DOPPLER: Transmitral velocity was minimally increased  There was mild stenosis  Mean Gradient 6-7 mm of hg There was mild regurgitation  AORTIC VALVE: The valve was functionally bicuspid  Leaflets exhibited normal thickness and normal cuspal separation  DOPPLER: Transaortic velocity was increased due to valvular stenosis  There was moderate to severe stenosis  Shima 1 0 cm2,  peak gradient 65, mean 35 mm of hg There was no regurgitation  TRICUSPID VALVE: DOPPLER: There was mild to moderate regurgitation  Estimated peak PA pressure was 55 to 60 mmHg  The findings suggest moderate pulmonary hypertension  PULMONIC VALVE: DOPPLER: There was trace regurgitation  PERICARDIUM: There was no thickening or calcification  There was no pericardial effusion  AORTA: The root exhibited normal size  SYSTEMIC VEINS: IVC: The inferior vena cava was mildly dilated  The respirophasic change in diameter was more than 50%  SYSTEM MEASUREMENT TABLES    2D mode  AoR Diam 2D: 3 1 cm  LA Diam (2D): 5 3 cm  LA/Ao (2D): 1 71  FS (2D Teich): 25 7 %  IVSd (2D): 1 3 cm  LVDEV: 130 cmï¾³  LVESV: 64 7 cmï¾³  LVIDd(2D): 5 21 cm  LVISd (2D): 3 87 cm  LVOT Area 2D: 3 46 cmï¾²  LVPWd (2D): 1 33 cm  SV (Teich): 65 3 cmï¾³    Apical four chamber  LVEF A4C: 54 %    Unspecified Scan Mode  SHIMA Cont Eq (Peak Vitaly): 1 03 cmï¾²  LVOT Diam : 2 1 cm  LVOT Vmax: 1160 mm/s  LVOT Vmax; Mean: 1160 mm/s  Peak Grad ; Mean: 5 mm[Hg]  MV Peak A Vitaly: 434 mm/s  MV Peak E Vitaly  Mean: 1680 mm/s  MVA (PHT): 3 01 cmï¾²  PHT: 73 ms  Max P mm[Hg]  V Max: 2920 mm/s  Vmax: 3410 mm/s  TAPSE: 1 5 cm    [employer] Accredited Echocardiography Laboratory    Prepared and electronically signed by    Roosevelt Momin MD  Signed 14-Sep-2020 13:14:06    Meds/Allergies   all current active meds have been reviewed and current meds:   Current Facility-Administered Medications   Medication Dose Route Frequency    acetaminophen (TYLENOL) tablet 975 mg  975 mg Oral Q8H [address]    albuterol (PROVENTIL HFA,VENTOLIN HFA) inhaler 2 puff  2 puff Inhalation Q6H PRN    allopurinol (ZYLOPRIM) tablet 200 mg  200 mg Oral Daily    ascorbic acid (VITAMIN C) tablet 500 mg  500 mg Oral Daily    aspirin chewable tablet 81 mg  81 mg Oral Daily    bisacodyl (DULCOLAX) rectal suppository 10 mg  10 mg Rectal Daily    calcium carbonate-vitamin D (OSCAL-D) 500 mg-200 units per tablet 2 tablet  2 tablet Oral HS    cholecalciferol (VITAMIN D3) tablet 1,000 Units  1,000 Units Oral Daily    cyanocobalamin (VITAMIN B-12) tablet 100 mcg  100 mcg Oral Daily    DULoxetine (CYMBALTA) delayed release capsule 60 mg  60 mg Oral Daily    felodipine (PLENDIL) 24 hr tablet 5 mg  5 mg Oral Daily    finasteride (PROSCAR) tablet 5 mg  5 mg Oral Daily    fluticasone-vilanterol (BREO ELLIPTA) 200-25 MCG/INH inhaler 1 puff  1 puff Inhalation Daily    furosemide (LASIX) injection 40 mg  40 mg Intravenous BID (diuretic)    gabapentin (NEURONTIN) capsule 300 mg  300 mg Oral BID    glucosamine sulfate capsule 500 mg  500 mg Oral BID    levalbuterol (XOPENEX) inhalation solution 0 63 mg  0 63 mg Nebulization Q8H PRN    levalbuterol (XOPENEX) inhalation solution 1 25 mg  1 25 mg Nebulization BID    naloxone (NARCAN) 0 04 mg/mL syringe 0 04 mg  0 04 mg Intravenous Q1MIN PRN    oxyCODONE (ROXICODONE) IR tablet 2 5 mg  2 5 mg Oral Q4H PRN    oxyCODONE (ROXICODONE) IR tablet 5 mg  5 mg Oral Q4H PRN    polyethylene glycol (MIRALAX) packet 17 g  17 g Oral Daily    pravastatin (PRAVACHOL) tablet 40 mg  40 mg Oral Daily With Dinner    rivaroxaban (XARELTO) tablet 15 mg  15 mg Oral HS    senna-docusate sodium (SENOKOT S) 8 6-50 mg per tablet 1 tablet  1 tablet Oral BID    sodium chloride 0 9 % inhalation solution 3 mL  3 mL Nebulization BID     Medications Prior to Admission   Medication    albuterol (Ventolin HFA) 90 mcg/act inhaler    allopurinol (ZYLOPRIM) 100 mg tablet    ascorbic acid (VITAMIN C) 500 mg tablet    B Complex Vitamins (B COMPLEX 1 PO)    Biotin (BIOTIN 5000) 5 MG CAPS    calcium carbonate-vitamin D (OSCAL-D) 500 mg-200 units per tablet    cholecalciferol (VITAMIN D3) 1,000 units tablet    clopidogrel (PLAVIX) 75 mg tablet    colchicine (COLCRYS) 0 6 mg tablet    Cranberry 1000 MG CAPS    cyanocobalamin 1000 MCG tablet    dextromethorphan-guaiFENesin (ROBITUSSIN DM)  mg/5 mL syrup    docusate sodium (COLACE) 100 mg capsule    DULoxetine (CYMBALTA) 60 mg delayed release capsule    felodipine (PLENDIL) 5 mg 24 hr tablet    finasteride (PROSCAR) 5 mg tablet    Flaxseed, Linseed, (EQL FLAX SEED OIL) 1000 MG CAPS    fluocinonide (LIDEX) 0 05 % cream    furosemide (LASIX) 40 mg tablet    gabapentin (Neurontin) 300 mg capsule    glucosamine-chondroitin 500-400 MG tablet    rivaroxaban (XARELTO) 15 mg tablet    simvastatin (ZOCOR) 20 mg tablet    spironolactone (ALDACTONE) 25 mg tablet    Farxiga 5 MG TABS    fluticasone-umeclidinium-vilanterol (Trelegy Ellipta) 200-62 5-25 MCG/INH AEPB inhaler     Counseling / Coordination of Care  Total floor / unit time spent today 20 minutes  Greater than 50% of total time was spent with the patient and / or family counseling and / or coordination of care  ** Please Note: Dragon 360 Dictation voice to text software may have been used in the creation of this document   **

## 2022-02-03 NOTE — PHYSICAL THERAPY NOTE
PHYSICAL THERAPY TREATMENT NOTE          Patient Name: Deya Raza  ZRIKI'P Date: 2/3/2022          02/03/22 1539   PT Last Visit   PT Visit Date 22   Note Type   Note Type Treatment   Pain Assessment   Pain Assessment Tool 0-10   Pain Score 3   Pain Location/Orientation Orientation: Bilateral  (feet)   Pain Onset/Description Onset: Ongoing   Effect of Pain on Daily Activities limits pt's tolerance to mobility   Patient's Stated Pain Goal No pain   Hospital Pain Intervention(s) Repositioned; Ambulation/increased activity; Emotional support   Restrictions/Precautions   Weight Bearing Precautions Per Order Yes   RLE Weight Bearing Per Order WBAT   Other Precautions Chair Alarm; Bed Alarm;WBS;O2;Fall Risk  (9L mid-flow via NC)   General   Chart Reviewed Yes   Additional Pertinent History POD 3: R IM nail   Response to Previous Treatment Patient with no complaints from previous session  Family/Caregiver Present Yes  (Pt's family)   Cognition   Overall Cognitive Status WFL   Arousal/Participation Alert; Cooperative   Attention Within functional limits   Orientation Level Oriented X4   Memory Within functional limits   Following Commands Follows one step commands without difficulty   Comments Pt ID via name and ; pt agreeable to PT tx   Subjective   Subjective "I will do whatever you say and then some"   Bed Mobility   Supine to Sit 2  Maximal assistance   Additional items Assist x 2;HOB elevated; Increased time required;Verbal cues;LE management   Sit to Supine 2  Maximal assistance   Additional items Assist x 1; Increased time required;Verbal cues;LE management   Additional Comments Pt able to tolerate upright sitting position w/ supervision and no evidence of Lob for approximately 7-8min prior to OOB mobility  Pt able to laterally scoot at EOB w/ VC and supervision      Transfers   Sit to Stand 2  Maximal assistance   Additional items Assist x 2;Increased time required;Verbal cues   Stand to Sit 2  Maximal assistance   Additional items Assist x 2; Increased time required;Verbal cues   Additional Comments Pt able to perform 3 STS transfer trials w/ Max Ax2  Pt able to maintain static standing balance for approximately 10, 20, and 30 seconds w/ BUE on RW  VC for LE positioning, hand placement, and transfer technique  Pt able to achieve 75% full stand during first trial and complete upright position during 2nd and 3rd trial  Pt required extended seated rest break between each trial due to fatigue   Ambulation/Elevation   Gait pattern Not tested   Ambulation/Elevation Additional Comments Pt unable to clear BLE from floor in standing position at this time   Balance   Static Sitting Fair   Dynamic Sitting Fair -   Static Standing Zero  (Ax2 w/ RW)   Endurance Deficit   Endurance Deficit Yes   Endurance Deficit Description Pt requires extended therapeutic rest breaks between mobility trials and therapeutic exercises due to fatigue and fluctuating SpO2 readings  Pt's SpO2 reading 80-81% upon arrival to room  Breathing education provided throughout w/ SpO2 ranging from 86-92% w/ mobility and read 88% at end of session  Activity Tolerance   Activity Tolerance Patient limited by fatigue;Patient limited by pain   Nurse Made Aware RN Melanie present and assisted w/ functional mobility   Exercises   Glute Sets Supine;AROM; Bilateral  (1x10 w/ 3 sec hold)   Knee AROM Short Arc Quad Supine;AROM;AAROM  (1x10; LLE: AROM, RLE: AAROM)   Ankle Pumps Supine;AROM; Bilateral  (2x10)   Assessment   Prognosis Fair   Problem List Decreased strength;Decreased endurance; Impaired balance;Decreased mobility; Decreased safety awareness; Obesity;Orthopedic restrictions;Pain   Assessment Pt seen today for PT intervention w/ pt agreeable to participate   Pt requires Max Ax1-2 for supine<>sit bed mobility and demonstrated ability to maintain static sitting balance at EOB w/ supervision and no evidence of LOB  Compared to previous session, pt w/ increased tolerance to functional mobility  Pt able to perform 3 STS transfer trials w/ Max Ax2  VC for hand placement, LE positioning, and transfer technique provided w/ pt demonstrating carryover of cues between transfer trials  Pt able to achieve full upright standing position x2 trials w/ Max Ax2 and BUE on RW; however, pt continues to be unable to clear BLE from floor to initiate ambulation despite VC for weight-shifting and Max Ax2  Pt performed LE therapeutic exercises w/ assistance as needed w/ education provided to pt on HEP  Throughout tx, pt requires extended therapeutic rest breaks due to fatigue and O2 saturation w/ breathing technique education provided by PT and RN throughout  Pt remains mobilizing below baseline level and is motivated to participate in PT intervention  Continue to recommend DC to post acute rehab when medically cleared  Pt and pt's family educated on role of PT in acute care, progression of mobility, and role of PT upon DC at post acute rehab   Goals   Patient Goals to be able to walk into the bathroom   STG Expiration Date 02/11/22   Short Term Goal #1 Patient will: Increase bilateral LE strength 1/2 grade to facilitate independent mobility, Perform all bed mobility tasks w/ mod A x 1 to decrease fall risk factors, Perform all transfers w/ mod A x 1 to improve independence (+/- use of QuickMove, or trialing slideboard), Increase all balance 1/2 grade to decrease risk for falls, Complete exercise program independently and Tolerate 3 hr OOB to faciliate upright tolerance, PT to see for gait when appropriate   PT Treatment Day 1   Plan   Treatment/Interventions Functional transfer training;LE strengthening/ROM; Therapeutic exercise; Endurance training;Patient/family training;Equipment eval/education; Bed mobility  (PT to see for ambulation when appropriate)   Progress Progressing toward goals   PT Frequency 4-6x/wk   Recommendation   PT Discharge Recommendation Post acute rehabilitation services   Equipment Recommended [facility] Recommended Wheeled walker  (wide RW)   Additional Comments Plan to trial [employer] and/or sliding board transfer in future PT session to increase pt's functional OOB mobility   AM-PAC Basic Mobility Inpatient   Turning in Bed Without Bedrails 2   Lying on Back to Sitting on Edge of Flat Bed 1   Moving Bed to Chair 1   Standing Up From Chair 1   Walk in Room 1   Climb 3-5 Stairs 1   Basic Mobility Inpatient Raw Score 7   Turning Head Towards Sound 4   Follow Simple Instructions 4   Low Function Basic Mobility Raw Score 15   Low Function Basic Mobility Standardized Score 23 9   Highest Level Of Mobility   JH-HL Goal 2: Bed activities/Dependent transfer   JH-HLM Highest Level of Mobility 3: Sit at edge of bed   JH-HLM Goal Achieved Yes   Education   Education Provided Mobility training;Home exercise program;Assistive device   Patient Demonstrates acceptance/verbal understanding;Explanation/teachback used; Reinforcement needed   End of Consult   Patient Position at End of Consult Supine;Bed/Chair alarm activated; All needs within reach  (HOB elevated, SpO2 readin%)         The patient's AM-PAC Basic Mobility Inpatient Short Form Raw Score is 7  A Raw score of less than or equal to 16 suggests the patient may benefit from discharge to post-acute rehabilitation services  Please also refer to the recommendation of the Physical Therapist for safe discharge planning      DC rec: post acute rehab      Nikolas Reid, PT, DPT  22

## 2022-02-03 NOTE — ASSESSMENT & PLAN NOTE
Lab Results   Component Value Date    EGFR 33 02/02/2022    EGFR 32 02/01/2022    EGFR 29 01/31/2022    CREATININE 1 86 (H) 02/02/2022    CREATININE 1 87 (H) 02/01/2022    CREATININE 2 04 (H) 01/31/2022     - Patient with history of chronic hypertension   - Monitor blood pressures with goal systolic pressure less than 160  - Appreciate Cardiology and Nephrology evaluation and recommendations   - Outpatient follow-up with PCP per routine

## 2022-02-03 NOTE — ASSESSMENT & PLAN NOTE
Lab Results   Component Value Date    EGFR 40 02/03/2022    EGFR 33 02/02/2022    EGFR 32 02/01/2022    CREATININE 1 59 (H) 02/03/2022    CREATININE 1 86 (H) 02/02/2022    CREATININE 1 87 (H) 02/01/2022     - Patient with chronic history of CKD stage 3 with baseline creatinine appearing to be between 1 4 and 1 5   - creatinine improved today after receiving 80 mg of Lasix yesterday  - Continue to monitor I&O- respiratory exam is improved today, the patient continues to require 10 L mid flow on my exam   - continue home Lasix  - nephrology and cardiology consulted and notes appreciated

## 2022-02-03 NOTE — PROGRESS NOTES
Progress Note - Acute Pain Service    Jeff Olivo 80 y o  male MRN: [id number]  Unit/Bed#: W -01 Encounter: [id number]      Assessment:   Principal Problem:    Closed fracture of right femur (HCC)  Active Problems:    Chronic pain syndrome    Pain in both lower extremities    Benign hypertension with chronic kidney disease, stage III ([facility])    Acute kidney injury superimposed on chronic kidney disease ([facility])    Chronic diastolic CHF (congestive heart failure) ([facility])    Chronic a-fib ([facility])    Lumbar radiculopathy    Hyponatremia    Stage 3a chronic kidney disease ([facility])    CAD (coronary artery disease)    Fall      Jeff Olivo is a 80 y o  male with PMH of chronic pain syndrome, CAD s/p stents, mod/severe aortic stenosis, chronic a-fib, COPD, pacemaker, sleep apnea on CPAP, smoking history who presented 1/29 after slip and fall on ice with right femur fx   Pt underwent insertion nail IM femur on 1/31 under [facility] with PENG nerve block  Acute pain service consulted for post op pain management due to history of chronic pain  This morning the patient is complaining mostly of pain that appears to be neuropathic in nature in his bilateral calves and feet  He states his pain is 4/10 and achy  He states this pain occurs even at rest  He does not endorse any spasms  His post-surgical pain in his right lateral hip is only 4/01, worse with certain movements  Patient is concerned that his gabapentin has been the cause of his falls at home and would like to discontinue this medication  He denies nausea or vomiting or any current adverse side effects from his medications  Plan:   - Continue oxycodone 2 5mg/5mg PO q4h PRN moderate/severe pain for post-surgical pain control    -Discontinue gabapentin at patient request  He believes it is contributing to his falls   Can instead consider replacing this with Lyrica 25mg PO tid if patient is willing for neuropathic pain      -Continue Cymbalta 60mg PO daily for neuropathic pain    -Continue acetaminophen 975mg PO q8h      -Bowel regimen per primary team    APS will sign off at this time  Thank you for the consult  All opioids and other analgesics to be written at discretion of primary team  Please contact Acute Pain Service - [facility] via [employer] from [phone] with additional questions or concerns  See Melanie or Carlos for additional contacts and after hours information      Pain History  Current pain location(s): bilateral lower extremities and feet  Pain Scale:   4/10  Quality: achy  24 hour history: good pain control    Opioid requirement previous 24 hours: oxycodone 2 5mg x1    Meds/Allergies   all current active meds have been reviewed and current meds:   Current Facility-Administered Medications   Medication Dose Route Frequency    acetaminophen (TYLENOL) tablet 975 mg  975 mg Oral Q8H [facility]    albuterol (PROVENTIL HFA,VENTOLIN HFA) inhaler 2 puff  2 puff Inhalation Q6H PRN    allopurinol (ZYLOPRIM) tablet 200 mg  200 mg Oral Daily    ascorbic acid (VITAMIN C) tablet 500 mg  500 mg Oral Daily    aspirin chewable tablet 81 mg  81 mg Oral Daily    bisacodyl (DULCOLAX) rectal suppository 10 mg  10 mg Rectal Daily    calcium carbonate-vitamin D (OSCAL-D) 500 mg-200 units per tablet 2 tablet  2 tablet Oral HS    cholecalciferol (VITAMIN D3) tablet 1,000 Units  1,000 Units Oral Daily    cyanocobalamin (VITAMIN B-12) tablet 100 mcg  100 mcg Oral Daily    DULoxetine (CYMBALTA) delayed release capsule 60 mg  60 mg Oral Daily    felodipine (PLENDIL) 24 hr tablet 5 mg  5 mg Oral Daily    finasteride (PROSCAR) tablet 5 mg  5 mg Oral Daily    fluticasone-vilanterol (BREO ELLIPTA) 200-25 MCG/INH inhaler 1 puff  1 puff Inhalation Daily    furosemide (LASIX) injection 40 mg  40 mg Intravenous BID (diuretic)    gabapentin (NEURONTIN) capsule 300 mg  300 mg Oral BID    glucosamine sulfate capsule 500 mg  500 mg Oral BID    guaiFENesin (MUCINEX) 12 hr tablet 600 mg  600 mg Oral Q12H [address]    levalbuterol (XOPENEX) inhalation solution 0 63 mg  0 63 mg Nebulization Q8H PRN    levalbuterol (XOPENEX) inhalation solution 1 25 mg  1 25 mg Nebulization BID    naloxone (NARCAN) 0 04 mg/mL syringe 0 04 mg  0 04 mg Intravenous Q1MIN PRN    oxyCODONE (ROXICODONE) IR tablet 2 5 mg  2 5 mg Oral Q4H PRN    oxyCODONE (ROXICODONE) IR tablet 5 mg  5 mg Oral Q4H PRN    polyethylene glycol (MIRALAX) packet 17 g  17 g Oral Daily    pravastatin (PRAVACHOL) tablet 40 mg  40 mg Oral Daily With Dinner    predniSONE tablet 60 mg  60 mg Oral Daily    rivaroxaban (XARELTO) tablet 15 mg  15 mg Oral HS    senna-docusate sodium (SENOKOT S) 8 6-50 mg per tablet 1 tablet  1 tablet Oral BID    sodium chloride 0 9 % inhalation solution 3 mL  3 mL Nebulization BID       No Known Allergies    Objective     Temp:  [97 4 °F (36 3 °C)-98 3 °F (36 8 °C)] 97 4 °F (36 3 °C)  HR:  [70-74] 70  Resp:  [18-20] 19  BP: (111-122)/(57-63) 115/63    Physical Exam  Vitals reviewed  Constitutional:       General: He is not in acute distress  Eyes:      Extraocular Movements: Extraocular movements intact  Pupils: Pupils are equal, round, and reactive to light  Pulmonary:      Effort: Pulmonary effort is normal  No respiratory distress  Comments: On humidified nasal cannula  Musculoskeletal:      Comments: 5/5 strength with dorsiflexion and plantarflexion of the ankles b/l  Right lateral hip dressing clean, dry, intact  Skin:     General: Skin is warm and dry  Neurological:      Mental Status: He is alert and oriented to person, place, and time  Comments: Sensation is grossly intact in all dermatomal distributions of the b/l lower extremities     Psychiatric:         Mood and Affect: Mood normal          Behavior: Behavior normal          Lab Results:   Results from last 7 days   Lab Units 02/03/22  0535   WBC Thousand/uL 8 18   HEMOGLOBIN g/dL 7 4*   HEMATOCRIT % 24 8*   PLATELETS Thousands/uL 208 Results from last 7 days   Lab Units 02/03/22  0535 02/02/22  0602 02/01/22  0456   POTASSIUM mmol/L 3 7   < > 4 2   CHLORIDE mmol/L 101   < > 98*   CO2 mmol/L 28   < > 26   BUN mg/dL 51*   < > 52*   CREATININE mg/dL 1 59*   < > 1 87*   CALCIUM mg/dL 8 9   < > 8 7   ALK PHOS U/L  --   --  53   ALT U/L  --   --  15   AST U/L  --   --  28    < > = values in this interval not displayed  Imaging Studies: I have personally reviewed pertinent reports  EKG, Pathology, and Other Studies: I have personally reviewed pertinent reports  Please note that the APS provides consultative services regarding pain management only  With the exception of ketamine and epidural infusions and except when indicated, final decisions regarding starting or changing doses of analgesic medications are at the discretion of the consulting service  Off hours consultation and/or medication management is generally not available      Darius Chapman DO  Acute Pain Service

## 2022-02-03 NOTE — PROGRESS NOTES
[facility]  Progress Note Coretta Sarabia 1940, 80 y o  male MRN: [id number]  Unit/Bed#: W -01 Encounter: [id number]  Primary Care Provider: Sam Poole DO   Date and time admitted to hospital: 1/29/2022  9:34 AM    Fall  Assessment & Plan  - Status post mechanical fall on ice with the below noted injuries and issues  - Fall precautions  - Geriatric Medicine consultation for evaluation, medication review and recommendations   - PT and OT evaluation and treatment as indicated  - Case Management consultation for disposition planning  CAD (coronary artery disease)  Assessment & Plan  - Patient with chronic significant history of CAD  Patient follows with Dr Leisa Ashley  - Appreciate [facility] evaluation and recommendations including discontinuing Plavix and continuing aspirin on discharge  - No evidence of acute cardiac event at this time  - EKG indicates ventricular pacing rhythm  - Continue home medication therapy as appropriate  Stage 3a chronic kidney disease [facility])  Assessment & Plan  Lab Results   Component Value Date    EGFR 40 02/03/2022    EGFR 33 02/02/2022    EGFR 32 02/01/2022    CREATININE 1 59 (H) 02/03/2022    CREATININE 1 86 (H) 02/02/2022    CREATININE 1 87 (H) 02/01/2022     - Patient with chronic history of CKD stage 3 with baseline creatinine appearing to be between 1 4 and 1 5   - creatinine improved today to 1 58  - nephrology was consulted note appreciated  - avoid hypotension    Hyponatremia  Assessment & Plan  - sodium improved today to 137   - continue to monitor  Lumbar radiculopathy  Assessment & Plan  - Patient with chronic lumbar radiculopathy and pain in his legs    Patient reports acutely worsened pain in his calves and feet since his fall   - Neurovascular exam intact on initial evaluation   - T and L-spine x-rays indicate no fractures  - patient has been complaining of worsening lower extremity pain for the past 3 days   Lower extremity duplex pending  - Continue multimodal analgesic regimen including gabapentin  APS consulted  - Outpatient follow-up with PCP and pain provider  Chronic a-fib ([facility])  Assessment & Plan  - Chronic history of atrial fibrillation  - continue home Xarelto  - Monitor for adequate heart rate control   - Continue home medication regimen as appropriate otherwise  - Appreciate Cardiology evaluation and recommendations   - Outpatient follow-up per team     Chronic diastolic CHF (congestive heart failure) ([facility])  Assessment & Plan  Wt Readings from Last 3 Encounters:   02/02/22 105 kg (231 lb 7 7 oz)   01/24/22 131 kg (289 lb)   01/06/22 130 kg (286 lb)     - Chronic history of diastolic CHF with evidence of acute pulmonary edema  - cardiology and Nephrology were consulted and note appreciated  - echocardiogram 1/31:  EF 65%  - SHAWNEE improved today to baseline kidney function  Continue home diuresis  Patient continues to require supplemental oxygen:  10 L mid flow on my evaluation  - continue to wean oxygen as able to maintain oxygen saturation greater than 93%  - consider pulmonology consultation if oxygen requirements persist       Acute kidney injury superimposed on chronic kidney disease [facility])  Assessment & Plan  Lab Results   Component Value Date    EGFR 40 02/03/2022    EGFR 33 02/02/2022    EGFR 32 02/01/2022    CREATININE 1 59 (H) 02/03/2022    CREATININE 1 86 (H) 02/02/2022    CREATININE 1 87 (H) 02/01/2022     - Patient with chronic history of CKD stage 3 with baseline creatinine appearing to be between 1 4 and 1 5   - creatinine improved today after receiving 80 mg of Lasix yesterday  - Continue to monitor I&O- respiratory exam is improved today, the patient continues to require 10 L mid flow on my exam   - continue home Lasix  - nephrology and cardiology consulted and notes appreciated      Benign hypertension with chronic kidney disease, stage III [facility])  Assessment & Plan  Lab Results Component Value Date    EGFR 40 02/03/2022    EGFR 33 02/02/2022    EGFR 32 02/01/2022    CREATININE 1 59 (H) 02/03/2022    CREATININE 1 86 (H) 02/02/2022    CREATININE 1 87 (H) 02/01/2022     - Patient with history of chronic hypertension   - Monitor blood pressures with goal systolic pressure less than 160  - Appreciate Cardiology and Nephrology evaluation and recommendations   - Outpatient follow-up with PCP per routine  Pain in both lower extremities  Assessment & Plan  · Patient complaining of calf pain  · History of lumbar radiculopathy and chronic lower extremity pain and notes that his right lower extremity pain is worse than usual his  · Neurovascularly intact  · Negative Homans sign  · Bilateral lower extremity duplex pending    Chronic pain syndrome  Assessment & Plan  - Patient with chronic pain syndrome   - Continue multimodal analgesic regimen with additional agents added/adjustments made in setting of acute pain secondary to traumatic injury  - Bowel regimen while on opioid therapy  - APS consult to assist with acute on chronic pain and anticipated postoperative pain  - Outpatient follow-up with PCP and pain provider  * Closed fracture of right femur [facility])  Assessment & Plan  - Acute right intertrochanteric femur fracture, present on admission   - Appreciate Orthopedic surgery evaluation and recommendations s/p IM nail 1/31  - WBAT RLE  - Patient's respiratory status has improved with multiple interventions and cardiology, nephrology have evaluated the patient  - Cardiology consultation indicates patient is intermediate to high perioperative risk, however it is a necessary surgery in order for the patient to ambulate  - Monitor right lower extremity neurovascular exam   - Continue multimodal analgesic regimen-pain controlled  - DVT prophylaxis--Xarelto  - PT and OT evaluation and treatment as indicated  - follow-up with orthopedics as an outpatient            Disposition:  Pending weaning supplemental oxygen requirements  Will require placement  SUBJECTIVE:  Chief Complaint:  Right calf pain    Subjective:  Patient continues to complain of lower extremity pain particularly in bilateral calves  He states that the pain starts at his knees and radiates down to his toes  He states that this pain is chronic but has been worse in his right lower extremity since the surgery  He does not note any increased swelling  He has had decreased range of motion in his left lower since the surgery  He confirms that he has been tolerating his diet but has not had a bowel movement in several days  He is passing flatus  He denies any feelings of shortness of breath during my evaluation  He denies any feelings of palpitations, chest pain, nausea, vomiting        OBJECTIVE:     Meds/Allergies     Current Facility-Administered Medications:     acetaminophen (TYLENOL) tablet 975 mg, 975 mg, Oral, Q8H [address], Dawson Rahman MD, 975 mg at 02/03/22 0558    albuterol (PROVENTIL HFA,VENTOLIN HFA) inhaler 2 puff, 2 puff, Inhalation, Q6H PRN, Dawson Rahman MD, 2 puff at 01/30/22 1136    allopurinol (ZYLOPRIM) tablet 200 mg, 200 mg, Oral, Daily, Dawson Rahman MD, 200 mg at 02/02/22 9422    ascorbic acid (VITAMIN C) tablet 500 mg, 500 mg, Oral, Daily, Dawson Rahman MD, 500 mg at 02/02/22 8608    aspirin chewable tablet 81 mg, 81 mg, Oral, Daily, NAVA Blanchard, 81 mg at 02/02/22 8862    bisacodyl (DULCOLAX) rectal suppository 10 mg, 10 mg, Rectal, Daily, NAVA Trevino    calcium carbonate-vitamin D (OSCAL-D) 500 mg-200 units per tablet 2 tablet, 2 tablet, Oral, HS, Dawson Rahman MD, 2 tablet at 02/02/22 2231    cholecalciferol (VITAMIN D3) tablet 1,000 Units, 1,000 Units, Oral, Daily, Dawson Rahman MD, 1,000 Units at 02/02/22 0830    cyanocobalamin (VITAMIN B-12) tablet 100 mcg, 100 mcg, Oral, Daily, Dawson Rahman MD, 100 mcg at 02/02/22 1416    DULoxetine (CYMBALTA) delayed release capsule 60 mg, 60 mg, Oral, Daily, Marthe Krabbe, MD, 60 mg at 02/02/22 0830    felodipine (PLENDIL) 24 hr tablet 5 mg, 5 mg, Oral, Daily, Marthe Krabbe, MD, 5 mg at 02/01/22 0830    finasteride (PROSCAR) tablet 5 mg, 5 mg, Oral, Daily, Marthe Krabbe, MD, 5 mg at 02/02/22 0829    fluticasone-vilanterol (BREO ELLIPTA) 200-25 MCG/INH inhaler 1 puff, 1 puff, Inhalation, Daily, Marthe Krabbe, MD, 1 puff at 02/02/22 0831    furosemide (LASIX) tablet 40 mg, 40 mg, Oral, Daily, Leidy Bess MD, 40 mg at 02/02/22 8298    gabapentin (NEURONTIN) capsule 300 mg, 300 mg, Oral, BID, Marthe Krabbe, MD, 300 mg at 02/02/22 1740    glucosamine sulfate capsule 500 mg, 500 mg, Oral, BID, Marthe Krabbe, MD, 500 mg at 02/02/22 1740    levalbuterol (Dondra ) inhalation solution 0 63 mg, 0 63 mg, Nebulization, Q8H PRN, Marthe Krabbe, MD, 0 63 mg at 01/30/22 9386    levalbuterol (Dondra ) inhalation solution 1 25 mg, 1 25 mg, Nebulization, BID, Marthe Krabbe, MD, 1 25 mg at 02/03/22 0733    naloxone (NARCAN) 0 04 mg/mL syringe 0 04 mg, 0 04 mg, Intravenous, Q1MIN PRN, Marthe Krabbe, MD    oxyCODONE (ROXICODONE) IR tablet 2 5 mg, 2 5 mg, Oral, Q4H PRN, Marthe Krabbe, MD, 2 5 mg at 02/03/22 0558    oxyCODONE (ROXICODONE) IR tablet 5 mg, 5 mg, Oral, Q4H PRN, Marthe Krabbe, MD, 5 mg at 02/02/22 1011    polyethylene glycol (MIRALAX) packet 17 g, 17 g, Oral, Daily, NAVA Lund, 17 g at 02/02/22 0830    pravastatin (PRAVACHOL) tablet 40 mg, 40 mg, Oral, Daily With Dejah Carlos MD, 40 mg at 02/02/22 1740    rivaroxaban (XARELTO) tablet 15 mg, 15 mg, Oral, HS, NAVA Lund, 15 mg at 02/02/22 2231    senna-docusate sodium (SENOKOT S) 8 6-50 mg per tablet 1 tablet, 1 tablet, Oral, BID, Marthe Krabbe, MD, 1 tablet at 02/02/22 1740    sodium chloride 0 9 % inhalation solution 3 mL, 3 mL, Nebulization, BID, Marthe Krabbe, MD, 3 mL at 02/03/22 0733     Vitals:   Vitals:    02/03/22 0706   BP: 115/63   Pulse: 70   Resp: 19   Temp: (!) 97 4 °F (36 3 °C)   SpO2: 97%       Intake/Output:  I/O       02/01 0701 02/02 0700 02/02 0701 02/03 0700 02/03 0701  02/04 0700    P  O  1640 780     I V  (mL/kg)       IV Piggyback       Total Intake(mL/kg) 1640 (15 6) 780 (7 4)     Urine (mL/kg/hr) 1300 (0 5) 1325 (0 5)     Stool 0      Blood       Total Output 1300 1325     Net +340 -545            Unmeasured Stool Occurrence 0 x             Nutrition/GI Proph/Bowel Reg:  Continue current diet, increase bowel regimen  Will administer suppository today  Physical Exam:   GENERAL APPEARANCE:  No acute distress  NEURO:  GCS 15  Light touch sensation intact throughout  HEENT:  Normocephalic, atraumatic, neck supple  CV:  Irregularly irregular + murmur  +2 radial and dorsalis pedis pulses, bilaterally  LUNGS:  Clear to auscultation, bilaterally  No wheezing, no rhonchi, no rales  Inspiring 1 6 L on IS  GI:  Abdomen is soft and nontender  :  Pelvis is stable  MSK:  Limited range of motion of left lower extremity  Otherwise patient is able to fully move all other extremities  Minimal tenderness in left hip  Left thigh compartments are soft on palpation  No obvious tenderness on palpation of calves  No lower extremity swelling noted  SKIN:  Surgical wound is clean, dry, intact  Skin is warm, dry             Invasive Devices  Report    Peripheral Intravenous Line            Peripheral IV 01/31/22 Right Hand 2 days                 Lab Results:   Results: I have personally reviewed pertinent reports   , BMP/CMP:   Lab Results   Component Value Date    SODIUM 137 02/03/2022    K 3 7 02/03/2022     02/03/2022    CO2 28 02/03/2022    BUN 51 (H) 02/03/2022    CREATININE 1 59 (H) 02/03/2022    CALCIUM 8 9 02/03/2022    EGFR 40 02/03/2022    and CBC:   Lab Results   Component Value Date    WBC 8 18 02/03/2022    HGB 7 4 (L) 02/03/2022    HCT 24 8 (L) 02/03/2022    MCV 85 02/03/2022     02/03/2022    MCH 25 3 (L) 02/03/2022    MCHC 29 8 (L) 02/03/2022    RDW 17 1 (H) 02/03/2022    MPV 9 4 02/03/2022     Imaging/EKG Studies: Results: I have personally reviewed pertinent reports      Other Studies: none  VTE Prophylaxis: Sequential compression device (Venodyne) Xarelto

## 2022-02-03 NOTE — ASSESSMENT & PLAN NOTE
- Acute right intertrochanteric femur fracture, present on admission   - Appreciate Orthopedic surgery evaluation and recommendations s/p IM nail 1/31  - WBAT RLE  - Patient's respiratory status has improved with multiple interventions and cardiology, nephrology have evaluated the patient  - Cardiology consultation indicates patient is intermediate to high perioperative risk, however it is a necessary surgery in order for the patient to ambulate  - Monitor right lower extremity neurovascular exam   - Continue multimodal analgesic regimen-pain controlled  - DVT prophylaxis--Xarelto  - PT and OT evaluation and treatment as indicated  - follow-up with orthopedics as an outpatient

## 2022-02-03 NOTE — ASSESSMENT & PLAN NOTE
- Chronic history of atrial fibrillation  - continue home Xarelto  - Monitor for adequate heart rate control   - Continue home medication regimen as appropriate otherwise    - Appreciate Cardiology evaluation and recommendations   - Outpatient follow-up per team

## 2022-02-03 NOTE — ASSESSMENT & PLAN NOTE
Wt Readings from Last 3 Encounters:   02/02/22 105 kg (231 lb 7 7 oz)   01/24/22 131 kg (289 lb)   01/06/22 130 kg (286 lb)     - Chronic history of diastolic CHF with evidence of acute pulmonary edema  - cardiology and Nephrology were consulted and note appreciated  - echocardiogram 1/31:  EF 65%  - SHWANEE improved today to baseline kidney function  Continue home diuresis  Patient continues to require supplemental oxygen:  10 L mid flow on my evaluation  - continue to wean oxygen as able to maintain oxygen saturation greater than 93%    - consider pulmonology consultation if oxygen requirements persist

## 2022-02-03 NOTE — ASSESSMENT & PLAN NOTE
Wt Readings from Last 3 Encounters:   02/02/22 105 kg (231 lb 7 7 oz)   01/24/22 131 kg (289 lb)   01/06/22 130 kg (286 lb)     - Chronic history of diastolic CHF with evidence of acute pulmonary edema  - cardiology and Nephrology were consulted and note appreciated  - In setting of mildly elevated creatinine and suspected mild SHAWNEE superimposed on chronic kidney disease stage 3, Nephrology consulted   - echocardiogram 1/31:  EF 65%  - despite patient continuing to have a mild SHAWNEE evident by creatinine of 1 86, patient continues to have increased oxygen demands, with a CXR showing evidence of pulmonary vascular congestion  Recommending IV diuresis today, to help wean oxygen demand  Will discuss with Cardiology and Nephrology

## 2022-02-03 NOTE — ASSESSMENT & PLAN NOTE
Lab Results   Component Value Date    EGFR 33 02/02/2022    EGFR 32 02/01/2022    EGFR 29 01/31/2022    CREATININE 1 86 (H) 02/02/2022    CREATININE 1 87 (H) 02/01/2022    CREATININE 2 04 (H) 01/31/2022     - Patient with chronic history of CKD stage 3 with baseline creatinine appearing to be between 1 4 and 1 5   - creatinine has been elevated since admission ranging from 1 7-1 23   - nephrology was consulted note appreciated  - patient's creatinine has stabilized over the past 2 days at 1 8  - due to increased oxygen demands and worsening CXR, patient will likely require diuresis today  - will discuss with Cardiology Nephrology    - avoid hypotension

## 2022-02-03 NOTE — ASSESSMENT & PLAN NOTE
· Patient complaining of calf pain  · History of lumbar radiculopathy and chronic lower extremity pain and notes that his right lower extremity pain is worse than usual his  · Neurovascularly intact    · Negative Homans sign  · Bilateral lower extremity duplex pending

## 2022-02-03 NOTE — ASSESSMENT & PLAN NOTE
- Acute right intertrochanteric femur fracture, present on admission   - Appreciate Orthopedic surgery evaluation and recommendations s/p IM nail 1/31  - WBAT RLE  - Patient's respiratory status has improved with multiple interventions and cardiology, nephrology have evaluated the patient  - Cardiology consultation indicates patient is intermediate to high perioperative risk, however it is a necessary surgery in order for the patient to ambulate  - Monitor right lower extremity neurovascular exam   - Continue multimodal analgesic regimen   - DVT prophylaxis--Xarelto  - PT and OT evaluation and treatment as indicated  - follow-up with orthopedics as an outpatient  Physician Discharge Summary     Patient ID:  Carol Menendez  [id number]  89 year old  7/8/1925    Admit date: 9/6/2017    Discharge date and time: 9/8/2017, 1130    Admitting Physician: Odessa Singh DO     Discharge Physician: Odessa Singh DO    Admission Diagnoses: intraventricular hemorrhage    Discharge Diagnoses: Intraventricular Hemorrhage    Admission Condition: good    Discharged Condition: good    Indication for Admission: Intraventricular hemorrhage    Hospital Course: Carol Menendez is a 89 year old female who presents after a fall in her memory care unit at [facility]. Fall was unwitnessed. She crashed to the floor. Workup at [facility] demonstrated a head laceration which was not repaired and a very small traumatic intraventricular hemorrhage as well as hypertensive urgency. For this she was transferred to [facility] for further care.  She was treated with nicardipine overnight for her blood pressure which fell WNL the next morning. Repeat imaging of her IVH has been unremarkable. Neurologically, the patient is at her baseline. She needs no further follow up from a neurological point of view. Her blood pressure management can be resumed per her primary care provider at the memory care unit.     Consults: neurosurgery    Significant Diagnostic Studies: radiology: CT scan: IVH    Treatments: Nicaripine    Discharge Exam:   General: Alert, elderly female, resting in bed.   Neuro: Oriented to self only.  FC x4 with equal strength to all extremities. Sensation intact throughout. PERRL. Visual acuity intact to all fields.   Neck: Supple, without JVD  Chest: Lung sounds clear with auscultation to all fields. Without accessory muscle use.   Heart: S1, S2, no rub, murmur, or gallop  Abdomen: Soft, nontender. Normoactive throughout all abdomonal quadrants.  Extremities: 2+ bilateral radial and DP pulses. No edema.   Skin: Warm, intact.     Disposition: Memory Care Unit    Patient  Instructions:   Activity: activity as tolerated  Diet: regular diet  Wound Care: none needed    No follow up is necessary     I spent 30 minutes coordinating discharge for this patient    [facility]  ELIO Hair  [phone]      Attending Physician Addendum  I evaluated and examined Carol AYAZ Menendez with Mr Dupont. Together we reviewed relevant imaging and laboratory data, and jointly formulated the assessment and plan as documented.  My comments are reflected below.    Active Diagnoses:   1. Hypertensive crisis with resultant small IVH  2. Scalp laceration  3. Advanced dementia  4. Essential HTN  5. DM Type 2    Plan:   - Serial CT brain with stable, small IVH. No further work-up or follow-up required.   - Continue scheduled home medications for essential HTN.   - Scalp lac was repaired with Dermabond. Avoid using shampoo directly to this area x7 days.   - Patient is at her baseline metal status and will return to her memory care unit.     Odessa Singh DO  [facility]  [phone] (pager)

## 2022-02-03 NOTE — PROGRESS NOTES
Progress Note - Orthopedics   [place] B[place] 80 y o  male MRN: [id number]  Unit/Bed#: W -01      Subjective:    81 y o male seen and evaluated  Reports hip pain is well controlled, only notes pain distally at the lower leg although he states this is better than yesterday  No additional complaints       Labs:  0   Lab Value Date/Time    HCT 24 8 (L) 02/03/2022 0535    HCT 24 9 (L) 02/02/2022 0602    HCT 25 9 (L) 02/01/2022 0456    HCT 45 9 01/07/2017 0834    HCT 43 8 07/16/2016 0752    HCT 41 0 04/07/2016 1141    HGB 7 4 (L) 02/03/2022 0535    HGB 7 5 (L) 02/02/2022 0602    HGB 7 6 (L) 02/01/2022 0456    HGB 15 3 01/07/2017 0834    HGB 14 9 07/16/2016 0752    HGB 13 4 04/07/2016 1141    INR 1 57 (H) 11/21/2021 1330    WBC 8 18 02/03/2022 0535    WBC 10 63 (H) 02/02/2022 0602    WBC 9 65 02/01/2022 0456    WBC 8 40 03/20/2018 0000    WBC 11-30 (A) 06/15/2017 1023    WBC 9 9 01/07/2017 0834    WBC 9 2 07/16/2016 0752       Meds:    Current Facility-Administered Medications:     acetaminophen (TYLENOL) tablet 975 mg, 975 mg, Oral, Q8H [address], Leticia Mayorga MD, 975 mg at 02/03/22 0558    albuterol (PROVENTIL HFA,VENTOLIN HFA) inhaler 2 puff, 2 puff, Inhalation, Q6H PRN, Leticia Mayorga MD, 2 puff at 01/30/22 1136    allopurinol (ZYLOPRIM) tablet 200 mg, 200 mg, Oral, Daily, Leticia Mayorga MD, 200 mg at 02/02/22 7025    ascorbic acid (VITAMIN C) tablet 500 mg, 500 mg, Oral, Daily, Leticia Mayorga MD, 500 mg at 02/02/22 7810    aspirin chewable tablet 81 mg, 81 mg, Oral, Daily, NAVA Blanchard, 81 mg at 02/02/22 4959    bisacodyl (DULCOLAX) rectal suppository 10 mg, 10 mg, Rectal, Daily PRN, NAVA Aragon    calcium carbonate-vitamin D (OSCAL-D) 500 mg-200 units per tablet 2 tablet, 2 tablet, Oral, HS, Leticia Mayorga MD, 2 tablet at 02/02/22 8978    cholecalciferol (VITAMIN D3) tablet 1,000 Units, 1,000 Units, Oral, Daily, Leticia Mayorga MD, 1,000 Units at 02/02/22 0818   cyanocobalamin (VITAMIN B-12) tablet 100 mcg, 100 mcg, Oral, Daily, Justin Sanchez MD, 100 mcg at 02/02/22 7311    DULoxetine (CYMBALTA) delayed release capsule 60 mg, 60 mg, Oral, Daily, Justin Sanchez MD, 60 mg at 02/02/22 0830    felodipine (PLENDIL) 24 hr tablet 5 mg, 5 mg, Oral, Daily, Justin Sanchez MD, 5 mg at 02/01/22 0830    finasteride (PROSCAR) tablet 5 mg, 5 mg, Oral, Daily, Justin Sanchez MD, 5 mg at 02/02/22 0829    fluticasone-vilanterol (BREO ELLIPTA) 200-25 MCG/INH inhaler 1 puff, 1 puff, Inhalation, Daily, Justin Sanchez MD, 1 puff at 02/02/22 0831    furosemide (LASIX) tablet 40 mg, 40 mg, Oral, Daily, Miguel Kim MD, 40 mg at 02/02/22 5104    gabapentin (NEURONTIN) capsule 300 mg, 300 mg, Oral, BID, Justin Sanchez MD, 300 mg at 02/02/22 1740    glucosamine sulfate capsule 500 mg, 500 mg, Oral, BID, Justin Sanchez MD, 500 mg at 02/02/22 1740    levalbuterol (Cletis Clack) inhalation solution 0 63 mg, 0 63 mg, Nebulization, Q8H PRN, Justin Sanchez MD, 0 63 mg at 01/30/22 0510    levalbuterol (Cletis Clack) inhalation solution 1 25 mg, 1 25 mg, Nebulization, BID, Justin Sanchez MD, 1 25 mg at 02/02/22 2102    naloxone (NARCAN) 0 04 mg/mL syringe 0 04 mg, 0 04 mg, Intravenous, Q1MIN PRN, Justin Sanchez MD    oxyCODONE (ROXICODONE) IR tablet 2 5 mg, 2 5 mg, Oral, Q4H PRN, Justin Sanchez MD, 2 5 mg at 02/03/22 0558    oxyCODONE (ROXICODONE) IR tablet 5 mg, 5 mg, Oral, Q4H PRN, Justin Sanchez MD, 5 mg at 02/02/22 1011    polyethylene glycol (MIRALAX) packet 17 g, 17 g, Oral, Daily, NAVA Zuñiga, 17 g at 02/02/22 0830    pravastatin (PRAVACHOL) tablet 40 mg, 40 mg, Oral, Daily With Jose Luis Merritt MD, 40 mg at 02/02/22 1740    rivaroxaban (XARELTO) tablet 15 mg, 15 mg, Oral, HS, NAVA Zuñiga, 15 mg at 02/02/22 2231    senna-docusate sodium (SENOKOT S) 8 6-50 mg per tablet 1 tablet, 1 tablet, Oral, BID, Justin Sanchez MD, 1 tablet at 02/02/22 1740    sodium chloride 0 9 % inhalation solution 3 mL, 3 mL, Nebulization, BID, Christiano Haynes MD, 3 mL at 02/02/22 2102    Blood Culture:   Lab Results   Component Value Date    BLOODCX No Growth After 5 Days  09/13/2020    BLOODCX No Growth After 5 Days  09/13/2020       Wound Culture:   No results found for: WOUNDCULT    Ins and Outs:  I/O last 24 hours: In: 780 [P O :780]  Out: 1325 [Urine:1325]          Physical:  Vitals:    02/03/22 0706   BP: 115/63   Pulse: 70   Resp: 19   Temp: (!) 97 4 °F (36 3 °C)   SpO2: 97%     Musculoskeletal: right Lower Extremity  · Skin without significant edema, no calf tenderness   · Dressing C/D/I  · No significant tenderness   · SILT s/s/sp/dp/t  +fhl/ehl, +ankle dorsi/plantar flexion  2+ DP pulse    Assessment:    81 y o male POD 4 s/p right short TFN      Plan:  · WBAT RLE  · Hgb 7 5 yesterday, no new level today yet   Continue to monitor, vitals stable    · Medical mgmt per primary team, pt is requiring more O2 (10L today)  · PT/OT  · Pain control  · DVT ppx  · Dispo: ortho stable    Mandi Rivera PA-C

## 2022-02-03 NOTE — ASSESSMENT & PLAN NOTE
Lab Results   Component Value Date    EGFR 40 02/03/2022    EGFR 33 02/02/2022    EGFR 32 02/01/2022    CREATININE 1 59 (H) 02/03/2022    CREATININE 1 86 (H) 02/02/2022    CREATININE 1 87 (H) 02/01/2022     - Patient with history of chronic hypertension   - Monitor blood pressures with goal systolic pressure less than 160  - Appreciate Cardiology and Nephrology evaluation and recommendations   - Outpatient follow-up with PCP per routine

## 2022-02-03 NOTE — ASSESSMENT & PLAN NOTE
- sodium down to 130 post operatively  - patient on a fluid restriction and likely undergoing diuresis later today  - Continue to monitor

## 2022-02-03 NOTE — ASSESSMENT & PLAN NOTE
· Patient complaining of calf pain  · Patient reports that he has chronic lower extremity pain, but this is worse than usual  · Neurovascularly intact  · Negative Homans sign  · Bilateral lower extremity duplex ordered

## 2022-02-03 NOTE — PLAN OF CARE
Problem: Potential for Falls  Goal: Patient will remain free of falls  Description: INTERVENTIONS:  - Educate patient/family on patient safety including physical limitations  - Instruct patient to call for assistance with activity   - Consult OT/PT to assist with strengthening/mobility   - Keep Call bell within reach  - Keep bed low and locked with side rails adjusted as appropriate  - Keep care items and personal belongings within reach  - Initiate and maintain comfort rounds  - Make Fall Risk Sign visible to staff  - Offer Toileting every 2 Hours, in advance of need  - Initiate/Maintain alarm  - Obtain necessary fall risk management equipment:   - Apply yellow socks and bracelet for high fall risk patients  - Consider moving patient to room near nurses station  Outcome: Progressing     Problem: Prexisting or High Potential for Compromised Skin Integrity  Goal: Skin integrity is maintained or improved  Description: INTERVENTIONS:  - Identify patients at risk for skin breakdown  - Assess and monitor skin integrity  - Assess and monitor nutrition and hydration status  - Monitor labs   - Assess for incontinence   - Turn and reposition patient  - Assist with mobility/ambulation  - Relieve pressure over bony prominences  - Avoid friction and shearing  - Provide appropriate hygiene as needed including keeping skin clean and dry  - Evaluate need for skin moisturizer/barrier cream  - Collaborate with interdisciplinary team   - Patient/family teaching  - Consider wound care consult   Outcome: Progressing     Problem: MOBILITY - ADULT  Goal: Maintain or return to baseline ADL function  Description: INTERVENTIONS:  -  Assess patient's ability to carry out ADLs; assess patient's baseline for ADL function and identify physical deficits which impact ability to perform ADLs (bathing, care of mouth/teeth, toileting, grooming, dressing, etc )  - Assess/evaluate cause of self-care deficits   - Assess range of motion  - Assess patient's mobility; develop plan if impaired  - Assess patient's need for assistive devices and provide as appropriate  - Encourage maximum independence but intervene and supervise when necessary  - Involve family in performance of ADLs  - Assess for home care needs following discharge   - Consider OT consult to assist with ADL evaluation and planning for discharge  - Provide patient education as appropriate  Outcome: Progressing  Goal: Maintains/Returns to pre admission functional level  Description: INTERVENTIONS:  - Perform BMAT or MOVE assessment daily    - Set and communicate daily mobility goal to care team and patient/family/caregiver  - Collaborate with rehabilitation services on mobility goals if consulted  - Perform Range of Motion 3 times a day  - Reposition patient every 2 hours  - Dangle patient 3 times a day  - Stand patient 3 times a day  - Ambulate patient 3 times a day  - Out of bed to chair 3 times a day   - Out of bed for meals 3 times a day  - Out of bed for toileting  - Record patient progress and toleration of activity level   Outcome: Progressing     Problem: Nutrition/Hydration-ADULT  Goal: Nutrient/Hydration intake appropriate for improving, restoring or maintaining nutritional needs  Description: Monitor and assess patient's nutrition/hydration status for malnutrition  Collaborate with interdisciplinary team and initiate plan and interventions as ordered  Monitor patient's weight and dietary intake as ordered or per policy  Utilize nutrition screening tool and intervene as necessary  Determine patient's food preferences and provide high-protein, high-caloric foods as appropriate       INTERVENTIONS:  - Monitor oral intake, urinary output, labs, and treatment plans  - Assess nutrition and hydration status and recommend course of action  - Evaluate amount of meals eaten  - Assist patient with eating if necessary   - Allow adequate time for meals  - Recommend/ encourage appropriate diets, oral nutritional supplements, and vitamin/mineral supplements  - Order, calculate, and assess calorie counts as needed  - Recommend, monitor, and adjust tube feedings and TPN/PPN based on assessed needs  - Assess need for intravenous fluids  - Provide specific nutrition/hydration education as appropriate  - Include patient/family/caregiver in decisions related to nutrition  Outcome: Progressing

## 2022-02-03 NOTE — ASSESSMENT & PLAN NOTE
- Patient with chronic significant history of CAD  Patient follows with Dr Shirley Goel  - Appreciate [facility] evaluation and recommendations including discontinuing Plavix and continuing aspirin on discharge  - No evidence of acute cardiac event at this time  - EKG indicates ventricular pacing rhythm  - Continue home medication therapy as appropriate

## 2022-02-03 NOTE — PROGRESS NOTES
[facility]  Progress Note Alejandrina Starkey 1940, 80 y o  male MRN: [id number]  Unit/Bed#: W -01 Encounter: [id number]  Primary Care Provider: Delma Arriaga DO   Date and time admitted to hospital: 1/29/2022  9:34 AM    Fall  Assessment & Plan  - Status post mechanical fall on ice with the below noted injuries and issues  - Fall precautions  - Geriatric Medicine consultation for evaluation, medication review and recommendations   - PT and OT evaluation and treatment as indicated  - Case Management consultation for disposition planning  CAD (coronary artery disease)  Assessment & Plan  - Patient with chronic significant history of CAD  Patient follows with Dr Deniz Prather  - Appreciate [facility]'s Cardiology evaluation and recommendations including discontinuing Plavix and continuing aspirin on discharge  - No evidence of acute cardiac event at this time  - EKG indicates ventricular pacing rhythm  - Continue home medication therapy as appropriate  Stage 3a chronic kidney disease [facility])  Assessment & Plan  Lab Results   Component Value Date    EGFR 33 02/02/2022    EGFR 32 02/01/2022    EGFR 29 01/31/2022    CREATININE 1 86 (H) 02/02/2022    CREATININE 1 87 (H) 02/01/2022    CREATININE 2 04 (H) 01/31/2022     - Patient with chronic history of CKD stage 3 with baseline creatinine appearing to be between 1 4 and 1 5   - creatinine has been elevated since admission ranging from 1 7-1 23   - nephrology was consulted note appreciated  - patient's creatinine has stabilized over the past 2 days at 1 8  - due to increased oxygen demands and worsening CXR, patient will likely require diuresis today  - will discuss with Cardiology Nephrology  - avoid hypotension    Hyponatremia  Assessment & Plan  - sodium down to 130 post operatively  - patient on a fluid restriction and likely undergoing diuresis later today  - Continue to monitor      Lumbar radiculopathy  Assessment & Plan  - Patient with chronic lumbar radiculopathy and pain in his legs  Patient reports acutely worsened pain in his calves and feet since his fall   - Neurovascular exam intact on initial evaluation   - T and L-spine x-rays indicate no fractures  - no further workup indicated at this time  - Continue multimodal analgesic regimen including gabapentin  - Outpatient follow-up with PCP and pain provider  Chronic a-fib ([facility])  Assessment & Plan  - Chronic history of atrial fibrillation  - continue home Xarelto  - Monitor for adequate heart rate control   - Continue home medication regimen as appropriate otherwise  - Appreciate Cardiology evaluation and recommendations   - Outpatient follow-up per team     Chronic diastolic CHF (congestive heart failure) ([facility])  Assessment & Plan  Wt Readings from Last 3 Encounters:   02/02/22 105 kg (231 lb 7 7 oz)   01/24/22 131 kg (289 lb)   01/06/22 130 kg (286 lb)     - Chronic history of diastolic CHF with evidence of acute pulmonary edema  - cardiology and Nephrology were consulted and note appreciated  - In setting of mildly elevated creatinine and suspected mild SHAWNEE superimposed on chronic kidney disease stage 3, Nephrology consulted   - echocardiogram 1/31:  EF 65%  - despite patient continuing to have a mild SHAWNEE evident by creatinine of 1 86, patient continues to have increased oxygen demands, with a CXR showing evidence of pulmonary vascular congestion  Recommending IV diuresis today, to help wean oxygen demand  Will discuss with Cardiology and Nephrology        Acute kidney injury superimposed on chronic kidney disease [facility])  Assessment & Plan  Lab Results   Component Value Date    EGFR 33 02/02/2022    EGFR 32 02/01/2022    EGFR 29 01/31/2022    CREATININE 1 86 (H) 02/02/2022    CREATININE 1 87 (H) 02/01/2022    CREATININE 2 04 (H) 01/31/2022     - Patient with chronic history of CKD stage 3 with baseline creatinine appearing to be between 1 4 and 1 5   - Cr elevated on admission and has been worsening due to use of diuretics in the setting of pulmonary vascular congestion and acute respiratory distress and associated hypotension    - nephrology and cardiology consulted and notes appreciated  - patient requires diuresis today, evident by worsening CXR and continued supplemental oxygen requirements, will discuss with nephrology and cardiology team     Benign hypertension with chronic kidney disease, stage III [facility])  Assessment & Plan  Lab Results   Component Value Date    EGFR 33 02/02/2022    EGFR 32 02/01/2022    EGFR 29 01/31/2022    CREATININE 1 86 (H) 02/02/2022    CREATININE 1 87 (H) 02/01/2022    CREATININE 2 04 (H) 01/31/2022     - Patient with history of chronic hypertension   - Monitor blood pressures with goal systolic pressure less than 160  - Appreciate Cardiology and Nephrology evaluation and recommendations   - Outpatient follow-up with PCP per routine  Pain in both lower extremities  Assessment & Plan  · Patient complaining of calf pain  · Patient reports that he has chronic lower extremity pain, but this is worse than usual  · Neurovascularly intact  · Negative Homans sign  · Bilateral lower extremity duplex ordered  Chronic pain syndrome  Assessment & Plan  - Patient with chronic pain syndrome   - Continue multimodal analgesic regimen with additional agents added/adjustments made in setting of acute pain secondary to traumatic injury  - Bowel regimen while on opioid therapy  - APS consult to assist with acute on chronic pain and anticipated postoperative pain  - Outpatient follow-up with PCP and pain provider      * Closed fracture of right femur [facility])  Assessment & Plan  - Acute right intertrochanteric femur fracture, present on admission   - Appreciate Orthopedic surgery evaluation and recommendations s/p IM nail 1/31  - WBAT RLE  - Patient's respiratory status has improved with multiple interventions and cardiology, nephrology have evaluated the patient  - Cardiology consultation indicates patient is intermediate to high perioperative risk, however it is a necessary surgery in order for the patient to ambulate  - Monitor right lower extremity neurovascular exam   - Continue multimodal analgesic regimen   - DVT prophylaxis--Xarelto  - PT and OT evaluation and treatment as indicated  - follow-up with orthopedics as an outpatient  Disposition:  Patient will require rehab  Rehab placement pending improvement of oxygen demands and kidney function  SUBJECTIVE:  Chief Complaint:  Calf pain    Subjective:  Patient complains of lower extremity calf pain, bilaterally  He states that he chronically has lower extremity pain but states that this is worse than usual   He states that his hip pain is currently under control  He confirms that he has been tolerating his diet and getting adequate sleep at night        OBJECTIVE:     Meds/Allergies     Current Facility-Administered Medications:     acetaminophen (TYLENOL) tablet 975 mg, 975 mg, Oral, Q8H [address], Landy Muñiz MD, 975 mg at 02/02/22 1420    albuterol (PROVENTIL HFA,VENTOLIN HFA) inhaler 2 puff, 2 puff, Inhalation, Q6H PRN, Landy Muñiz MD, 2 puff at 01/30/22 1136    allopurinol (ZYLOPRIM) tablet 200 mg, 200 mg, Oral, Daily, Landy Muñiz MD, 200 mg at 02/02/22 1767    ascorbic acid (VITAMIN C) tablet 500 mg, 500 mg, Oral, Daily, Landy Muñiz MD, 500 mg at 02/02/22 1399    aspirin chewable tablet 81 mg, 81 mg, Oral, Daily, NAVA Abebe, 81 mg at 02/02/22 1351    bisacodyl (DULCOLAX) rectal suppository 10 mg, 10 mg, Rectal, Daily PRN, NAVA Holliday    calcium carbonate-vitamin D (OSCAL-D) 500 mg-200 units per tablet 2 tablet, 2 tablet, Oral, HS, Landy Muñiz MD, 2 tablet at 02/01/22 2321    cholecalciferol (VITAMIN D3) tablet 1,000 Units, 1,000 Units, Oral, Daily, Landy Muñiz MD, 1,000 Units at 02/02/22 0830    cyanocobalamin (VITAMIN B-12) tablet 100 mcg, 100 mcg, Oral, Daily, Dawson Rahman MD, 100 mcg at 02/02/22 9955    DULoxetine (CYMBALTA) delayed release capsule 60 mg, 60 mg, Oral, Daily, Dawson Rahman MD, 60 mg at 02/02/22 0830    felodipine (PLENDIL) 24 hr tablet 5 mg, 5 mg, Oral, Daily, Dawson Rahman MD, 5 mg at 02/01/22 0830    finasteride (PROSCAR) tablet 5 mg, 5 mg, Oral, Daily, Dawson Rahman MD, 5 mg at 02/02/22 0829    fluticasone-vilanterol (BREO ELLIPTA) 200-25 MCG/INH inhaler 1 puff, 1 puff, Inhalation, Daily, Dawson Rahman MD, 1 puff at 02/02/22 0831    furosemide (LASIX) tablet 40 mg, 40 mg, Oral, Daily, Mindi Morrison MD, 40 mg at 02/02/22 0361    gabapentin (NEURONTIN) capsule 300 mg, 300 mg, Oral, BID, Dawson Rahman MD, 300 mg at 02/02/22 1740    glucosamine sulfate capsule 500 mg, 500 mg, Oral, BID, Dawson Rahman MD, 500 mg at 02/02/22 1740    HYDROmorphone (DILAUDID) injection 0 2 mg, 0 2 mg, Intravenous, Q4H PRN, Dawson Rahman MD    levalbuterol Oleg Burks) inhalation solution 0 63 mg, 0 63 mg, Nebulization, Q8H PRN, Dawson Rahman MD, 0 63 mg at 01/30/22 9526    levalbuterol Oleg Burks) inhalation solution 1 25 mg, 1 25 mg, Nebulization, BID, Dawson Rahman MD, 1 25 mg at 02/02/22 0736    naloxone (NARCAN) 0 04 mg/mL syringe 0 04 mg, 0 04 mg, Intravenous, Q1MIN PRN, Dawson Rahman MD    oxyCODONE (ROXICODONE) IR tablet 2 5 mg, 2 5 mg, Oral, Q4H PRN, Dawson Rahman MD    oxyCODONE (ROXICODONE) IR tablet 5 mg, 5 mg, Oral, Q4H PRN, Dawson Rahman MD, 5 mg at 02/02/22 1011    polyethylene glycol (MIRALAX) packet 17 g, 17 g, Oral, Daily, NAVA Trevino, 17 g at 02/02/22 0830    pravastatin (PRAVACHOL) tablet 40 mg, 40 mg, Oral, Daily With Fernando Quintana MD, 40 mg at 02/02/22 1740    rivaroxaban (XARELTO) tablet 15 mg, 15 mg, Oral, HS, NAVA Trevino    senna-docusate sodium (SENOKOT S) 8 6-50 mg per tablet 1 tablet, 1 tablet, Oral, BID, Jeffrey Cook MD, 1 tablet at 02/02/22 1740    sodium chloride 0 9 % inhalation solution 3 mL, 3 mL, Nebulization, BID, Jeffrey Cook MD, 3 mL at 02/02/22 0736     Vitals:   Vitals:    02/02/22 1555   BP: 122/61   Pulse: 70   Resp: 18   Temp: 97 7 °F (36 5 °C)   SpO2: 91%       Intake/Output:  I/O       02/01 0701  02/02 0700 02/02 0701  02/03 0700    P  O  1640 1260    I V  (mL/kg)      IV Piggyback      Total Intake(mL/kg) 1640 (15 6) 1260 (12)    Urine (mL/kg/hr) 1300 (0 5) 975 (0 7)    Stool 0     Blood      Total Output 1300 975    Net +340 +285          Unmeasured Stool Occurrence 0 x            Nutrition/GI Proph/Bowel Reg:  Continue current diet and bowel regimen  Physical Exam:   GENERAL APPEARANCE:  No acute distress  NEURO:  GCS is 15  Light touch sensation intact throughout  HEENT:  Normocephalic, atraumatic  Neck is supple  CV: RRR, +2 radial and dorsalis pedis pulses, bilaterally  LUNGS:  Wheezing and rhonchi audible on auscultation, worse on right side  Patient is on 6 L mid flow during my evaluation  No orthopnea  No tachypnea  GI:  Abdomen is soft nontender  :  Pelvis stable  MSK:  Patient moving all extremities  Limited range of motion of right lower leg  Negative Homans sign  No obvious discomfort on palpation of calves, though patient claims tenderness  Minimal swelling in right hip  SKIN:  Right hip surgical dressing is clean, dry, intact             Invasive Devices  Report    Peripheral Intravenous Line            Peripheral IV 01/31/22 Right Hand 2 days                 Lab Results:   Results: I have personally reviewed pertinent reports   , BMP/CMP:   Lab Results   Component Value Date    SODIUM 130 (L) 02/02/2022    K 3 7 02/02/2022    CL 96 (L) 02/02/2022    CO2 27 02/02/2022    BUN 59 (H) 02/02/2022    CREATININE 1 86 (H) 02/02/2022    CALCIUM 8 8 02/02/2022    EGFR 33 02/02/2022    and CBC:   Lab Results   Component Value Date    WBC 10 63 (H) 02/02/2022 HGB 7 5 (L) 02/02/2022    HCT 24 9 (L) 02/02/2022    MCV 84 02/02/2022     02/02/2022    MCH 25 3 (L) 02/02/2022    MCHC 30 1 (L) 02/02/2022    RDW 16 4 (H) 02/02/2022    MPV 9 4 02/02/2022    NRBC 1 02/02/2022     Imaging/EKG Studies: Results: I have personally reviewed pertinent reports      Other Studies: none  VTE Prophylaxis: Sequential compression device (Venodyne) Xarelto

## 2022-02-03 NOTE — PROGRESS NOTES
NEPHROLOGY PROGRESS NOTE   David Humphreys 80 y o  male MRN: [id number]  Unit/Bed#: W -01 Encounter: [id number]    ASSESSMENT & PLAN:  26-year-old male with history of chronic kidney disease, hypertension, diastolic CHF, aortic stenosis, gout, CAD was admitted after presenting with fall and found to have right proximal femoral fracture   Nephrology consulted for acute kidney injury     Acute kidney injury on chronic kidney disease stage 3  -baseline creatinine 1 3-1 5 and follows with Annmarie Sharri  -admission creatinine 1 7, received IV Lasix on 01/30 in the setting of fluid overload, creatinine trended up to 2 1 on 01/31 after which diuretics held   -underwent right proximal femur surgical repair on 01/31, receive pre and post surgery IV fluid   -elevated creatinine/acute kidney injury likely cardiorenal syndrome and from prerenal due to low blood pressure, further worsened in the setting of diuretics on January 30th   -on blood work from 02/03, renal function improving with creatinine down to 1 59 mg/dL with stable electrolytes  Monitor urine output and renal function with patient now initiated on IV Lasix  Avoid hypotension       -Avoid hypotension and nephrotoxins, dose medications per GFR     Fluid overload/acute on chronic diastolic CHF:    -Ejection fraction 55% with moderate to severe aortic stenosis and pulmonary hypertension  -status post 2 doses of IV Lasix on 01/30   -on January 31st, received pre and post surgery IV fluids   -clinically volume status acceptable patient has high oxygen requirement  Chest x-ray from 02/02 was personally reviewed by me, finding of right pleural effusion and pulmonary congestion noted  He received 40 mg of IV Lasix on 02/02 afternoon with limited urine output documented    Reviewed Cardiology note from today, noted plan to start on Lasix 40 mg IV b i d , agree with diuretics and close monitoring of renal function      Hyponatremia:   resolved, sodium level 137, continue to monitor      Primary hypertension, currently on felodipine with hold parameters  blood pressure acceptable, Avoid hypotension     Anemia iron deficiency with iron saturation 11%:  Hemoglobin trending down to 7 4 gram/deciliter, continue to monitor per primary team   Is a candidate for IV iron but holding off today as patient with fluid overload and receiving diuretics      Closed fracture of right femur, status post insertion of IM nail on January 31, 2022 continue postop care per Orthopedics     Others chronic AFib/severe aortic stenosis follows outpatient with Dr Elliott      SUBJECTIVE:  No new complaints but still with high oxygen requirement at 10 liters/minute  Wants to go to the bathroom    OBJECTIVE:  Current Weight: Weight - Scale: 105 kg (231 lb 7 7 oz)  Vitals:    02/03/22 0900   BP:    Pulse:    Resp:    Temp:    SpO2: 92%   /63   Pulse 70   Temp (!) 97 4 °F (36 3 °C)   Resp 19   Ht 6' 1" (1 854 m)   Wt 105 kg (231 lb 7 7 oz)   SpO2 92%   BMI 30 54 kg/m²       Intake/Output Summary (Last 24 hours) at 2/3/2022 1033  Last data filed at 2/3/2022 0900  Gross per 24 hour   Intake 660 ml   Output 1575 ml   Net -915 ml       Physical Exam  General:  Ill looking, awake  On oxygen by NC 10 L/min  Eyes: Conjunctivae pink,  Sclera anicteric  ENT: lips and mucous membranes moist  Neck: supple   Chest: Clear to Auscultation both lungs,  no crackles, ronchus or wheezing    CVS: S1 & S2 present, normal rate, regular rhythm, ESM+   Abdomen: soft, non-tender, non-distended, Bowel sounds normoactive  Extremities: no edema of  legs  Skin: no rash  Neuro: awake, alert, oriented x 3   Psych: Mood and affect appropriate     Medications:    Current Facility-Administered Medications:     acetaminophen (TYLENOL) tablet 975 mg, 975 mg, Oral, Q8H [address], Landy Muñiz MD, 975 mg at 02/03/22 0558    albuterol (PROVENTIL HFA,VENTOLIN HFA) inhaler 2 puff, 2 puff, Inhalation, Q6H PRN, Landy Muñiz MD, 2 puff at 01/30/22 1136    allopurinol (ZYLOPRIM) tablet 200 mg, 200 mg, Oral, Daily, Chago Berry MD, 200 mg at 02/03/22 0909    ascorbic acid (VITAMIN C) tablet 500 mg, 500 mg, Oral, Daily, Chago Berry MD, 500 mg at 02/03/22 0909    aspirin chewable tablet 81 mg, 81 mg, Oral, Daily, NAVA Zuñiga, 81 mg at 02/03/22 8435    bisacodyl (DULCOLAX) rectal suppository 10 mg, 10 mg, Rectal, Daily, NAVA Small, 10 mg at 02/03/22 0908    calcium carbonate-vitamin D (OSCAL-D) 500 mg-200 units per tablet 2 tablet, 2 tablet, Oral, HS, Chago Berry MD, 2 tablet at 02/02/22 2231    cholecalciferol (VITAMIN D3) tablet 1,000 Units, 1,000 Units, Oral, Daily, Chago Berry MD, 1,000 Units at 02/03/22 0909    cyanocobalamin (VITAMIN B-12) tablet 100 mcg, 100 mcg, Oral, Daily, Chago Berry MD, 100 mcg at 02/03/22 0909    DULoxetine (CYMBALTA) delayed release capsule 60 mg, 60 mg, Oral, Daily, Chago Berry MD, 60 mg at 02/03/22 0909    felodipine (PLENDIL) 24 hr tablet 5 mg, 5 mg, Oral, Daily, Chago Berry MD, 5 mg at 02/03/22 0908    finasteride (PROSCAR) tablet 5 mg, 5 mg, Oral, Daily, Chago Berry MD, 5 mg at 02/03/22 0909    fluticasone-vilanterol (BREO ELLIPTA) 200-25 MCG/INH inhaler 1 puff, 1 puff, Inhalation, Daily, Chago Berry MD, 1 puff at 02/03/22 0916    furosemide (LASIX) injection 40 mg, 40 mg, Intravenous, BID (diuretic), NAVA Marie, 40 mg at 02/03/22 0908    gabapentin (NEURONTIN) capsule 300 mg, 300 mg, Oral, BID, Chago Berry MD, 300 mg at 02/03/22 0909    glucosamine sulfate capsule 500 mg, 500 mg, Oral, BID, Chago Berry MD, 500 mg at 02/03/22 0908    guaiFENesin (MUCINEX) 12 hr tablet 600 mg, 600 mg, Oral, Q12H JOSE, NAVA Small    [facility]) inhalation solution 0 63 mg, 0 63 mg, Nebulization, Q8H PRN, Chago Berry MD, 0 63 mg at 01/30/22 0643    [facility]) inhalation solution 1 25 mg, 1 25 mg, Nebulization, BID, Georges Jacques MD, 1 25 mg at 02/03/22 0733    naloxone (NARCAN) 0 04 mg/mL syringe 0 04 mg, 0 04 mg, Intravenous, Q1MIN PRN, Georges Jacques MD    oxyCODONE (ROXICODONE) IR tablet 2 5 mg, 2 5 mg, Oral, Q4H PRN, Georges Jacques MD, 2 5 mg at 02/03/22 0558    oxyCODONE (ROXICODONE) IR tablet 5 mg, 5 mg, Oral, Q4H PRN, Georges Jacques MD, 5 mg at 02/02/22 1011    polyethylene glycol (MIRALAX) packet 17 g, 17 g, Oral, Daily, NAVA Petersen, 17 g at 02/03/22 0908    pravastatin (PRAVACHOL) tablet 40 mg, 40 mg, Oral, Daily With Edyta Pandey MD, 40 mg at 02/02/22 1740    predniSONE tablet 60 mg, 60 mg, Oral, Daily, NAVA Petersen    rivaroxaban (XARELTO) tablet 15 mg, 15 mg, Oral, HS, NAVA Petersen, 15 mg at 02/02/22 2231    senna-docusate sodium (SENOKOT S) 8 6-50 mg per tablet 1 tablet, 1 tablet, Oral, BID, Georges Jacques MD, 1 tablet at 02/03/22 0909    sodium chloride 0 9 % inhalation solution 3 mL, 3 mL, Nebulization, BID, Georges Jacques MD, 3 mL at 02/03/22 0733    Invasive Devices:        Lab Results:   Results from last 7 days   Lab Units 02/03/22  0535 02/02/22  0602 02/01/22  0456 01/31/22  2241 01/31/22  0510   WBC Thousand/uL 8 18 10 63* 9 65  --  11 24*   HEMOGLOBIN g/dL 7 4* 7 5* 7 6*  --  8 1*   HEMATOCRIT % 24 8* 24 9* 25 9*  --  27 1*   PLATELETS Thousands/uL 208 212 190   < > 226   POTASSIUM mmol/L 3 7 3 7 4 2   < > 3 9   CHLORIDE mmol/L 101 96* 98*   < > 101   CO2 mmol/L 28 27 26   < > 26   BUN mg/dL 51* 59* 52*   < > 49*   CREATININE mg/dL 1 59* 1 86* 1 87*   < > 2 16*   CALCIUM mg/dL 8 9 8 8 8 7   < > 8 8   MAGNESIUM mg/dL 2 3  --   --   --  2 3   ALK PHOS U/L  --   --  53  --   --    ALT U/L  --   --  15  --   --    AST U/L  --   --  28  --   --     < > = values in this interval not displayed         Previous work up:         Portions of the record may have been created with voice recognition software  Occasional wrong word or "sound a like" substitutions may have occurred due to the inherent limitations of voice recognition software  Read the chart carefully and recognize, using context, where substitutions have occurred  If you have any questions, please contact the dictating provider

## 2022-02-03 NOTE — ASSESSMENT & PLAN NOTE
- Patient with chronic significant history of CAD  Patient follows with Dr Dexter Núñez  - Appreciate [facility] evaluation and recommendations including discontinuing Plavix and continuing aspirin on discharge  - No evidence of acute cardiac event at this time  - EKG indicates ventricular pacing rhythm  - Continue home medication therapy as appropriate

## 2022-02-03 NOTE — ASSESSMENT & PLAN NOTE
- Patient with chronic lumbar radiculopathy and pain in his legs  Patient reports acutely worsened pain in his calves and feet since his fall   - Neurovascular exam intact on initial evaluation   - T and L-spine x-rays indicate no fractures  - patient has been complaining of worsening lower extremity pain for the past 3 days  Lower extremity duplex pending  - Continue multimodal analgesic regimen including gabapentin  APS consulted  - Outpatient follow-up with PCP and pain provider

## 2022-02-03 NOTE — ASSESSMENT & PLAN NOTE
Lab Results   Component Value Date    EGFR 33 02/02/2022    EGFR 32 02/01/2022    EGFR 29 01/31/2022    CREATININE 1 86 (H) 02/02/2022    CREATININE 1 87 (H) 02/01/2022    CREATININE 2 04 (H) 01/31/2022     - Patient with chronic history of CKD stage 3 with baseline creatinine appearing to be between 1 4 and 1 5   - Cr elevated on admission and has been worsening due to use of diuretics in the setting of pulmonary vascular congestion and acute respiratory distress and associated hypotension    - nephrology and cardiology consulted and notes appreciated    - patient requires diuresis today, evident by worsening CXR and continued supplemental oxygen requirements, will discuss with nephrology and cardiology team

## 2022-02-03 NOTE — PLAN OF CARE
Problem: PHYSICAL THERAPY ADULT  Goal: Performs mobility at highest level of function for planned discharge setting  See evaluation for individualized goals  Description: Treatment/Interventions: Functional transfer training,LE strengthening/ROM,Therapeutic exercise,Endurance training,Cognitive reorientation,Patient/family training,Equipment eval/education,Bed mobility (PT to see for gait when appropriate)          See flowsheet documentation for full assessment, interventions and recommendations  Outcome: Progressing  Note: Prognosis: Fair  Problem List: Decreased strength,Decreased endurance,Impaired balance,Decreased mobility,Decreased safety awareness,Obesity,Orthopedic restrictions,Pain  Assessment: Pt seen today for PT intervention w/ pt agreeable to participate  Pt requires Max Ax1-2 for supine<>sit bed mobility and demonstrated ability to maintain static sitting balance at EOB w/ supervision and no evidence of LOB  Compared to previous session, pt w/ increased tolerance to functional mobility  Pt able to perform 3 STS transfer trials w/ Max Ax2  VC for hand placement, LE positioning, and transfer technique provided w/ pt demonstrating carryover of cues between transfer trials  Pt able to achieve full upright standing position x2 trials w/ Max Ax2 and BUE on RW; however, pt continues to be unable to clear BLE from floor to initiate ambulation despite VC for weight-shifting and Max Ax2  Pt performed LE therapeutic exercises w/ assistance as needed w/ education provided to pt on HEP  Throughout tx, pt requires extended therapeutic rest breaks due to fatigue and O2 saturation w/ breathing technique education provided by PT and RN throughout  Pt remains mobilizing below baseline level and is motivated to participate in PT intervention  Continue to recommend DC to post acute rehab when medically cleared   Pt and pt's family educated on role of PT in acute care, progression of mobility, and role of PT upon DC at post acute rehab           PT Discharge Recommendation: Post acute rehabilitation services          See flowsheet documentation for full assessment

## 2022-02-03 NOTE — ASSESSMENT & PLAN NOTE
Lab Results   Component Value Date    EGFR 40 02/03/2022    EGFR 33 02/02/2022    EGFR 32 02/01/2022    CREATININE 1 59 (H) 02/03/2022    CREATININE 1 86 (H) 02/02/2022    CREATININE 1 87 (H) 02/01/2022     - Patient with chronic history of CKD stage 3 with baseline creatinine appearing to be between 1 4 and 1 5   - creatinine improved today to 1 58  - nephrology was consulted note appreciated    - avoid hypotension

## 2022-02-04 PROBLEM — E87.1 HYPONATREMIA: Status: RESOLVED | Noted: 2019-04-08 | Resolved: 2022-02-04

## 2022-02-04 PROBLEM — N18.9 ACUTE KIDNEY INJURY SUPERIMPOSED ON CHRONIC KIDNEY DISEASE (HCC): Status: RESOLVED | Noted: 2017-06-27 | Resolved: 2022-02-04

## 2022-02-04 PROBLEM — N17.9 ACUTE KIDNEY INJURY SUPERIMPOSED ON CHRONIC KIDNEY DISEASE (HCC): Status: RESOLVED | Noted: 2017-06-27 | Resolved: 2022-02-04

## 2022-02-04 LAB
ANION GAP SERPL CALCULATED.3IONS-SCNC: 8 MMOL/L (ref 4–13)
BUN SERPL-MCNC: 47 MG/DL (ref 5–25)
CALCIUM SERPL-MCNC: 9.6 MG/DL (ref 8.3–10.1)
CHLORIDE SERPL-SCNC: 102 MMOL/L (ref 100–108)
CO2 SERPL-SCNC: 28 MMOL/L (ref 21–32)
CREAT SERPL-MCNC: 1.31 MG/DL (ref 0.6–1.3)
ERYTHROCYTE [DISTWIDTH] IN BLOOD BY AUTOMATED COUNT: 17.6 % (ref 11.6–15.1)
GFR SERPL CREATININE-BSD FRML MDRD: 50 ML/MIN/1.73SQ M
GLUCOSE SERPL-MCNC: 149 MG/DL (ref 65–140)
HCT VFR BLD AUTO: 25.7 % (ref 36.5–49.3)
HGB BLD-MCNC: 7.5 G/DL (ref 12–17)
MCH RBC QN AUTO: 24.8 PG (ref 26.8–34.3)
MCHC RBC AUTO-ENTMCNC: 29.2 G/DL (ref 31.4–37.4)
MCV RBC AUTO: 85 FL (ref 82–98)
NRBC BLD AUTO-RTO: 1 /100 WBCS
OSMOLALITY UR: 484 MMOL/KG
PLATELET # BLD AUTO: 230 THOUSANDS/UL (ref 149–390)
PMV BLD AUTO: 10 FL (ref 8.9–12.7)
POTASSIUM SERPL-SCNC: 4 MMOL/L (ref 3.5–5.3)
RBC # BLD AUTO: 3.03 MILLION/UL (ref 3.88–5.62)
SODIUM 24H UR-SCNC: 15 MOL/L
SODIUM SERPL-SCNC: 138 MMOL/L (ref 136–145)
WBC # BLD AUTO: 9.89 THOUSAND/UL (ref 4.31–10.16)

## 2022-02-04 PROCEDURE — 99024 POSTOP FOLLOW-UP VISIT: CPT | Performed by: PHYSICIAN ASSISTANT

## 2022-02-04 PROCEDURE — 94760 N-INVAS EAR/PLS OXIMETRY 1: CPT

## 2022-02-04 PROCEDURE — 99232 SBSQ HOSP IP/OBS MODERATE 35: CPT | Performed by: NURSE PRACTITIONER

## 2022-02-04 PROCEDURE — 94640 AIRWAY INHALATION TREATMENT: CPT

## 2022-02-04 PROCEDURE — 99232 SBSQ HOSP IP/OBS MODERATE 35: CPT | Performed by: INTERNAL MEDICINE

## 2022-02-04 PROCEDURE — 97530 THERAPEUTIC ACTIVITIES: CPT

## 2022-02-04 PROCEDURE — 84300 ASSAY OF URINE SODIUM: CPT | Performed by: INTERNAL MEDICINE

## 2022-02-04 PROCEDURE — 80048 BASIC METABOLIC PNL TOTAL CA: CPT | Performed by: NURSE PRACTITIONER

## 2022-02-04 PROCEDURE — 94668 MNPJ CHEST WALL SBSQ: CPT

## 2022-02-04 PROCEDURE — 99232 SBSQ HOSP IP/OBS MODERATE 35: CPT | Performed by: SURGERY

## 2022-02-04 PROCEDURE — 85027 COMPLETE CBC AUTOMATED: CPT | Performed by: NURSE PRACTITIONER

## 2022-02-04 PROCEDURE — 83935 ASSAY OF URINE OSMOLALITY: CPT | Performed by: INTERNAL MEDICINE

## 2022-02-04 PROCEDURE — 97535 SELF CARE MNGMENT TRAINING: CPT

## 2022-02-04 RX ORDER — FUROSEMIDE 40 MG/1
40 TABLET ORAL DAILY
Status: DISCONTINUED | OUTPATIENT
Start: 2022-02-05 | End: 2022-02-07 | Stop reason: HOSPADM

## 2022-02-04 RX ORDER — FUROSEMIDE 20 MG/1
20 TABLET ORAL EVERY EVENING
Status: DISCONTINUED | OUTPATIENT
Start: 2022-02-05 | End: 2022-02-07 | Stop reason: HOSPADM

## 2022-02-04 RX ADMIN — ACETAMINOPHEN 975 MG: 325 TABLET, FILM COATED ORAL at 21:29

## 2022-02-04 RX ADMIN — IRON SUCROSE 200 MG: 20 INJECTION, SOLUTION INTRAVENOUS at 11:49

## 2022-02-04 RX ADMIN — Medication 1000 UNITS: at 08:36

## 2022-02-04 RX ADMIN — FLUTICASONE FUROATE AND VILANTEROL TRIFENATATE 1 PUFF: 200; 25 POWDER RESPIRATORY (INHALATION) at 09:06

## 2022-02-04 RX ADMIN — DULOXETINE HYDROCHLORIDE 60 MG: 60 CAPSULE, DELAYED RELEASE ORAL at 08:36

## 2022-02-04 RX ADMIN — ACETAMINOPHEN 975 MG: 325 TABLET, FILM COATED ORAL at 13:46

## 2022-02-04 RX ADMIN — GUAIFENESIN 600 MG: 600 TABLET, EXTENDED RELEASE ORAL at 21:29

## 2022-02-04 RX ADMIN — ISODIUM CHLORIDE 3 ML: 0.03 SOLUTION RESPIRATORY (INHALATION) at 07:24

## 2022-02-04 RX ADMIN — FUROSEMIDE 40 MG: 10 INJECTION, SOLUTION INTRAMUSCULAR; INTRAVENOUS at 08:36

## 2022-02-04 RX ADMIN — Medication 2 TABLET: at 21:29

## 2022-02-04 RX ADMIN — Medication 500 MG: at 08:36

## 2022-02-04 RX ADMIN — POLYETHYLENE GLYCOL 3350 17 G: 17 POWDER, FOR SOLUTION ORAL at 08:35

## 2022-02-04 RX ADMIN — OXYCODONE HYDROCHLORIDE AND ACETAMINOPHEN 500 MG: 500 TABLET ORAL at 08:36

## 2022-02-04 RX ADMIN — Medication 500 MG: at 16:44

## 2022-02-04 RX ADMIN — METHYLNALTREXONE BROMIDE 8 MG: 8 INJECTION, SOLUTION SUBCUTANEOUS at 05:30

## 2022-02-04 RX ADMIN — Medication 100 MCG: at 08:36

## 2022-02-04 RX ADMIN — LEVALBUTEROL HYDROCHLORIDE 1.25 MG: 1.25 SOLUTION, CONCENTRATE RESPIRATORY (INHALATION) at 07:24

## 2022-02-04 RX ADMIN — BISACODYL 10 MG: 10 SUPPOSITORY RECTAL at 08:36

## 2022-02-04 RX ADMIN — FUROSEMIDE 40 MG: 10 INJECTION, SOLUTION INTRAMUSCULAR; INTRAVENOUS at 16:44

## 2022-02-04 RX ADMIN — FELODIPINE 5 MG: 2.5 TABLET, FILM COATED, EXTENDED RELEASE ORAL at 08:36

## 2022-02-04 RX ADMIN — ALLOPURINOL 200 MG: 100 TABLET ORAL at 08:35

## 2022-02-04 RX ADMIN — ASPIRIN 81 MG CHEWABLE TABLET 81 MG: 81 TABLET CHEWABLE at 08:36

## 2022-02-04 RX ADMIN — ACETAMINOPHEN 975 MG: 325 TABLET, FILM COATED ORAL at 05:30

## 2022-02-04 RX ADMIN — PRAVASTATIN SODIUM 40 MG: 40 TABLET ORAL at 16:44

## 2022-02-04 RX ADMIN — FINASTERIDE 5 MG: 5 TABLET, FILM COATED ORAL at 08:36

## 2022-02-04 RX ADMIN — PREDNISONE 60 MG: 20 TABLET ORAL at 08:36

## 2022-02-04 RX ADMIN — SENNOSIDES AND DOCUSATE SODIUM 1 TABLET: 50; 8.6 TABLET ORAL at 08:36

## 2022-02-04 RX ADMIN — GUAIFENESIN 600 MG: 600 TABLET, EXTENDED RELEASE ORAL at 08:35

## 2022-02-04 RX ADMIN — RIVAROXABAN 15 MG: 15 TABLET, FILM COATED ORAL at 21:29

## 2022-02-04 RX ADMIN — SENNOSIDES AND DOCUSATE SODIUM 1 TABLET: 50; 8.6 TABLET ORAL at 16:44

## 2022-02-04 NOTE — ASSESSMENT & PLAN NOTE
- Patient with chronic significant history of CAD  Patient follows with Dr Leisa Ashley  - Appreciate [facility] evaluation and recommendations including discontinuing Plavix and continuing aspirin on discharge  - No evidence of acute cardiac event at this time  - EKG indicates ventricular pacing rhythm  - Continue home medication therapy as appropriate

## 2022-02-04 NOTE — PROGRESS NOTES
[facility]  Progress Note Sherry Brianne 1940, 80 y o  male MRN: [id number]  Unit/Bed#: W -01 Encounter: [id number]  Primary Care Provider: Abbie Angeles DO   Date and time admitted to hospital: 1/29/2022  9:34 AM    Fall  Assessment & Plan  - Status post mechanical fall on ice with the below noted injuries and issues  - Fall precautions  - Geriatric Medicine consultation for evaluation, medication review and recommendations   - PT and OT evaluation and treatment as indicated  - Case Management consultation for disposition planning  CAD (coronary artery disease)  Assessment & Plan  - Patient with chronic significant history of CAD  Patient follows with Dr Earnest Guerra  - Appreciate [facility] evaluation and recommendations including discontinuing Plavix and continuing aspirin on discharge  - No evidence of acute cardiac event at this time  - EKG indicates ventricular pacing rhythm  - Continue home medication therapy as appropriate  Stage 3a chronic kidney disease [facility])  Assessment & Plan  Lab Results   Component Value Date    EGFR 40 02/03/2022    EGFR 33 02/02/2022    EGFR 32 02/01/2022    CREATININE 1 59 (H) 02/03/2022    CREATININE 1 86 (H) 02/02/2022    CREATININE 1 87 (H) 02/01/2022     - Patient with chronic history of CKD stage 3 with baseline creatinine appearing to be between 1 4 and 1 5   - creatinine improved  - nephrology was consulted note appreciated  - avoid hypotension    Hyponatremia  Assessment & Plan  - sodium improved today to 137   - continue to monitor  Lumbar radiculopathy  Assessment & Plan  - Patient with chronic lumbar radiculopathy and pain in his legs  Patient reports acutely worsened pain in his calves and feet since his fall   - Neurovascular exam intact on initial evaluation   - T and L-spine x-rays indicate no fractures  - patient has been complaining of worsening lower extremity pain for the past 3 days      - Lower extremity duplex negative  - Continue multimodal analgesic regimen including gabapentin  APS consulted  - Outpatient follow-up with PCP and pain provider  Chronic a-fib ([facility])  Assessment & Plan  - Chronic history of atrial fibrillation  - continue home Xarelto  - Monitor for adequate heart rate control   - Continue home medication regimen as appropriate otherwise  - Appreciate Cardiology evaluation and recommendations   - Outpatient follow-up per team     Chronic diastolic CHF (congestive heart failure) ([facility])  Assessment & Plan  Wt Readings from Last 3 Encounters:   02/02/22 105 kg (231 lb 7 7 oz)   01/24/22 131 kg (289 lb)   01/06/22 130 kg (286 lb)     - Chronic history of diastolic CHF with evidence of acute pulmonary edema  - cardiology and Nephrology were consulted and note appreciated  - echocardiogram 1/31:  EF 65%  - SHAWNEE improved today to baseline kidney function  Continue home diuresis  - Patient was able to hold a conversation with me on room air this morning with O2 saturations 92%, no shortness of breath or labored breathing  Patient does not appear to be fluid overloaded  - continue to wean oxygen as able to maintain oxygen saturation greater than 93%  - consider pulmonology consultation if oxygen requirements persist       Acute kidney injury superimposed on chronic kidney disease [facility])  Assessment & Plan  Lab Results   Component Value Date    EGFR 40 02/03/2022    EGFR 33 02/02/2022    EGFR 32 02/01/2022    CREATININE 1 59 (H) 02/03/2022    CREATININE 1 86 (H) 02/02/2022    CREATININE 1 87 (H) 02/01/2022     - Patient with chronic history of CKD stage 3 with baseline creatinine appearing to be between 1 4 and 1 5   - creatinine improved today after receiving 80 mg of Lasix yesterday    - Continue to monitor I&O- respiratory exam is improved today, the patient continues to require 10 L mid flow on my exam   - continue home Lasix  - nephrology and cardiology consulted and notes appreciated  Benign hypertension with chronic kidney disease, stage III [facility])  Assessment & Plan  Lab Results   Component Value Date    EGFR 40 02/03/2022    EGFR 33 02/02/2022    EGFR 32 02/01/2022    CREATININE 1 59 (H) 02/03/2022    CREATININE 1 86 (H) 02/02/2022    CREATININE 1 87 (H) 02/01/2022     - Patient with history of chronic hypertension   - Monitor blood pressures with goal systolic pressure less than 160  - Appreciate Cardiology and Nephrology evaluation and recommendations   - Outpatient follow-up with PCP per routine  Pain in both lower extremities  Assessment & Plan  · Patient complaining of calf pain  · History of lumbar radiculopathy and chronic lower extremity pain and notes that his right lower extremity pain is worse than usual   · Neurovascularly intact  · Negative Homans sign  · Bilateral lower extremity duplex negative for acute or chronic thrombus  · Chronic    Chronic pain syndrome  Assessment & Plan  - Patient with chronic pain syndrome   - Continue multimodal analgesic regimen with additional agents added/adjustments made in setting of acute pain secondary to traumatic injury  - Bowel regimen while on opioid therapy  - APS consult to assist with acute on chronic pain and anticipated postoperative pain  - Outpatient follow-up with PCP and pain provider  * Closed fracture of right femur [facility])  Assessment & Plan  - Acute right intertrochanteric femur fracture, present on admission   - Appreciate Orthopedic surgery evaluation and recommendations   - 1/31 right femur IM nail  - Maintain WBAT RLE  - Patient's respiratory status has improved with multiple interventions and cardiology, nephrology have evaluated the patient  - Monitor right lower extremity neurovascular exam   - Continue multimodal analgesic regimen-pain controlled  - DVT prophylaxis--Xarelto  - PT and OT evaluation and treatment as indicated  - follow-up with orthopedics as an outpatient          Disposition: Continue med surg level of care  Disposition pending weaning O2 and medical stabilization  Patient will require rehab      SUBJECTIVE:  Chief Complaint: "I haven't had a bowel movement"    Subjective: Patient reports he hasn't had a bowel movement for 7 days and he feels uncomfortable  He reports that he has been eating less than normal, denies abdominal pain or nausea  He reports his right hip pain is under control  Patient denies shortness of breath, chest pain, numbness or tingling        OBJECTIVE:     Meds/Allergies     Current Facility-Administered Medications:     acetaminophen (TYLENOL) tablet 975 mg, 975 mg, Oral, Q8H [facility], Abigail Prieto MD, 975 mg at 02/03/22 2119    albuterol (PROVENTIL HFA,VENTOLIN HFA) inhaler 2 puff, 2 puff, Inhalation, Q6H PRN, Abigail Prieto MD, 2 puff at 01/30/22 1136    allopurinol (ZYLOPRIM) tablet 200 mg, 200 mg, Oral, Daily, Abigail Prieto MD, 200 mg at 02/03/22 0909    ascorbic acid (VITAMIN C) tablet 500 mg, 500 mg, Oral, Daily, Abigail Prieto MD, 500 mg at 02/03/22 0909    aspirin chewable tablet 81 mg, 81 mg, Oral, Daily, NAVA Sherwood, 81 mg at 02/03/22 4932    bisacodyl (DULCOLAX) rectal suppository 10 mg, 10 mg, Rectal, Daily, Juan David Gonzalez CRNP, 10 mg at 02/03/22 0908    calcium carbonate-vitamin D (OSCAL-D) 500 mg-200 units per tablet 2 tablet, 2 tablet, Oral, HS, Abigail Prieto MD, 2 tablet at 02/03/22 2119    cholecalciferol (VITAMIN D3) tablet 1,000 Units, 1,000 Units, Oral, Daily, Abigail Prieto MD, 1,000 Units at 02/03/22 0909    cyanocobalamin (VITAMIN B-12) tablet 100 mcg, 100 mcg, Oral, Daily, Abigail Prieto MD, 100 mcg at 02/03/22 0909    DULoxetine (CYMBALTA) delayed release capsule 60 mg, 60 mg, Oral, Daily, Abigail Prieto MD, 60 mg at 02/03/22 0909    felodipine (PLENDIL) 24 hr tablet 5 mg, 5 mg, Oral, Daily, Abigail Prieto MD, 5 mg at 02/03/22 0908    finasteride (PROSCAR) tablet 5 mg, 5 mg, Oral, Daily, Kaur Reeves MD, 5 mg at 02/03/22 0909    fluticasone-vilanterol (BREO ELLIPTA) 200-25 MCG/INH inhaler 1 puff, 1 puff, Inhalation, Daily, Kaur Reeves MD, 1 puff at 02/03/22 0916    furosemide (LASIX) injection 40 mg, 40 mg, Intravenous, BID (diuretic), NAVA Valderrama, 40 mg at 02/03/22 1518    glucosamine sulfate capsule 500 mg, 500 mg, Oral, BID, Kaur Reeves MD, 500 mg at 02/03/22 1517    guaiFENesin (MUCINEX) 12 hr tablet 600 mg, 600 mg, Oral, Q12H [address], NAVA Pang, 600 mg at 02/03/22 2119    levalbuterol (Jeananne Rouse) inhalation solution 0 63 mg, 0 63 mg, Nebulization, Q8H PRN, Kaur Reeves MD, 0 63 mg at 01/30/22 1626    levalbuterol [facility]) inhalation solution 1 25 mg, 1 25 mg, Nebulization, BID, Kaur Reeves MD, 1 25 mg at 02/03/22 1919    methylnaltrexone (RELISTOR) subcutaneous injection 8 mg, 8 mg, Subcutaneous, Once, Kindra Sosa PA-C    naloxone (NARCAN) 0 04 mg/mL syringe 0 04 mg, 0 04 mg, Intravenous, Q1MIN PRN, Kaur Reeves MD    oxyCODONE (ROXICODONE) IR tablet 2 5 mg, 2 5 mg, Oral, Q4H PRN, Kaur Reeves MD, 2 5 mg at 02/03/22 0558    oxyCODONE (ROXICODONE) IR tablet 5 mg, 5 mg, Oral, Q4H PRN, Kaur Reeves MD, 5 mg at 02/02/22 1011    polyethylene glycol (MIRALAX) packet 17 g, 17 g, Oral, Daily, NAVA Pang, 17 g at 02/03/22 0908    pravastatin (PRAVACHOL) tablet 40 mg, 40 mg, Oral, Daily With Eduardo Blanco MD, 40 mg at 02/03/22 1517    predniSONE tablet 60 mg, 60 mg, Oral, Daily, NAVA Pang, 60 mg at 02/03/22 1058    rivaroxaban (XARELTO) tablet 15 mg, 15 mg, Oral, HS, NAVA Pang, 15 mg at 02/03/22 2119    senna-docusate sodium (SENOKOT S) 8 6-50 mg per tablet 1 tablet, 1 tablet, Oral, BID, Kaur Reeves MD, 1 tablet at 02/03/22 1517    sodium chloride 0 9 % inhalation solution 3 mL, 3 mL, Nebulization, BID, Kaur Reeves MD, 3 mL at 02/03/22 1919     Vitals:   Vitals: 02/03/22 2142   BP: 125/59   Pulse: 70   Resp: 18   Temp: 98 2 °F (36 8 °C)   SpO2: 98%       Intake/Output:  I/O       02/02 0701 02/03 0700 02/03 0701 02/04 0700    P  O  780 1560    Total Intake(mL/kg) 780 (7 4) 1560 (14 9)    Urine (mL/kg/hr) 1325 (0 5) 1500 (0 6)    Total Output 1325 1500    Net -545 +60          Unmeasured Urine Occurrence  1 x           Nutrition/GI Proph/Bowel Reg: Senokot S, Dulcolax    Physical Exam:   GENERAL APPEARANCE: Patient in no acute distress  HEENT: NCAT; PERRL, EOMs intact; Mucous membranes moist  CV: Regular rate and rhythm; no murmur/gallops/rubs appreciated  CHEST / LUNGS: Clear to auscultation; no wheezes/rales/rhonci  ABD: NABS; soft; non-distended; non-tender  : Voiding spontaneously  EXT: Right hip surgical dressing clean, dry, intact, nontender; compartments soft  +2 pulses bilaterally upper & lower extremities; no edema  NEURO: GCS 15; no focal neurologic deficits; neurovascularly intact  SKIN: Warm, dry and well perfused; no rash; no jaundice  Invasive Devices  Report    Peripheral Intravenous Line            Peripheral IV 02/03/22 Dorsal (posterior); Left Hand <1 day                 Lab Results:   Results: I have personally reviewed pertinent reports   , BMP/CMP:   Lab Results   Component Value Date    SODIUM 137 02/03/2022    K 3 7 02/03/2022     02/03/2022    CO2 28 02/03/2022    BUN 51 (H) 02/03/2022    CREATININE 1 59 (H) 02/03/2022    CALCIUM 8 9 02/03/2022    EGFR 40 02/03/2022    and CBC:   Lab Results   Component Value Date    WBC 8 18 02/03/2022    HGB 7 4 (L) 02/03/2022    HCT 24 8 (L) 02/03/2022    MCV 85 02/03/2022     02/03/2022    MCH 25 3 (L) 02/03/2022    MCHC 29 8 (L) 02/03/2022    RDW 17 1 (H) 02/03/2022    MPV 9 4 02/03/2022     Imaging/EKG Studies: Results: I have personally reviewed pertinent reports      Other Studies:   VAS lower limb venous duplex study, complete bilateral   Final Result by Nathen Arreola, DO (02/03 1202)      XR chest portable   Final Result by Benjamin Paris MD (49/89 2371)      Mild pulmonary edema with trace right effusion and mild right base atelectasis  Workstation performed: [id number]         XR hip/pelv 2-3 vws right if performed   Final Result by Murray Zavala MD (01/31 1354)      Fluoroscopy provided for procedure guidance  Please see separate procedure note for details  Workstation performed: [id number]         XR chest portable   Final Result by Benjamin Paris MD (01/30 1322)      Mild pulmonary venous congestion with trace right effusion  Workstation performed: [id number]         XR spine thoracic 3 vw   Final Result by Giovani Martinez MD (01/30 1094)      No acute osseous abnormality  Workstation performed: [id number]         XR spine lumbar 2 or 3 views injury   Final Result by Giovani Martinez MD (01/30 2992)      Normal examination  Workstation performed: [id number]         XR chest 1 view portable   Final Result by Elease Buerger, MD (01/29 1224)      No acute cardiopulmonary disease  Workstation performed: [id number]         XR pelvis ap only 1 or 2 vw   Final Result by Elease Buerger, MD (01/29 1227)      Acute intertrochanteric fracture of the right proximal femur  No associated hip dislocation  Workstation performed: [id number]         XR femur 2 vw right   Final Result by Elease Buerger, MD (01/29 1230)      Acute proximal femoral intertrochanteric fracture  Severe tricompartment osteoarthritis of the right knee              Workstation performed: [id number]             VTE Prophylaxis: Sequential compression device (Venodyne)  and home Xarelto

## 2022-02-04 NOTE — PLAN OF CARE
Problem: OCCUPATIONAL THERAPY ADULT  Goal: Performs self-care activities at highest level of function for planned discharge setting  See evaluation for individualized goals  Description: Treatment Interventions: ADL retraining,Functional transfer training,Patient/family training,Equipment evaluation/education,Neuromuscular reeducation          See flowsheet documentation for full assessment, interventions and recommendations  Outcome: Progressing  Note: Limitation: Decreased ADL status,Decreased Safe judgement during ADL,Decreased sensation,Decreased high-level ADLs,Decreased self-care trans  Prognosis: Good  Assessment: Pt seen at bedside for skilled OT tx session  It should be noted that pt participated in a co-tx session with PT 2' need or 2 skilled therapists for transfers as well as education  Furthermore, pt with limited activity tolerance and therefore co-tx maximized pt's overall performance on this date  Pt was eager and cooperative t/o session  Multiple seated supervised rest breaks needed following sit<>stand trials with cues needed for PLB as pt with tendency to mouth breathe heavily after exerting self  SpO2 dec'd to 85% at one point during session however this may have been a poor reading due to pt gripping RW as pt did maintain saturations above 92% t/o majority of session  Pt benefits from continued positive reinforcement and noted feeling pleased to hear progress made on this date  Use of quick move as pt not yet appropriate to advance to other seating surface areas on own power or even with the assist of staff due to significant dec'd standing tolerance  Recommend staff to continue to utilize quick move to assist pt to transfer other seating surfaces- especially for use of BSC rather than bedpan  Pt noted feeling constipated and sitting on BSC beneficial in terms of positioning to encourage a bowel movement   Pt demonstrates good sitting balance on this date and with some improvement re: LB self care  Pt would benefit from continued OT services to address deficits listed in evaluation and to continue to work toward goals put in place at evaluation        OT Discharge Recommendation: Post acute rehabilitation services  OT - OK to Discharge: Yes (once medically cleared)

## 2022-02-04 NOTE — PROGRESS NOTES
General Cardiology   Progress Note -  [facility]   Magdiel Palma 80 y o  male MRN: [id number]  Unit/Bed#: W -01 Encounter: [id number]    Assessment/ Plan    1  Acute right intertrochanteric hip fracture s/p IM nailing 1/31  POD #4  Per orthopedic surgery    2  Severe AS  FLY 0 91 cm 2, MG 41 mmHg  Normal LVEF  Outpatient follow up with Dr Leisa Ashley for possible TAVR referral    3  Chronic diastolic CHF  Home diuretic- Lasix 40 mg am and 20 mg pm    Does not examine particularly volume overloaded however he remained on 10L midflow cannula yesterday and CXR showed pulmonary edema and thus was started on furosemide 40 mg IV BID  He reports good urine output with decreased O2 requirements and improving renal function today  Continue IV Lasix through today, plan to transition back to oral diuretics tomorrow  Weights inaccurate due to use of bed scale   BMP in am      4  S/p dual chamber [employer]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  Battery life 6 months  Follow up with Dr Leisa Ashley    5  Chronic atrial fibrillation  Xarelto for [address]    6  CAD s/p PCI/LEOBARDO to prox RCA and mid LAD in 5/2019  No anginal symptoms  Continue statin and ASA  7  HTN- controlled on current regimen- average /54    8  SHAWNEE/CKD III- Creatinine back to baseline, 1 3 today  Repeat BMP in am      9  Anemia- hemoglobin stable over past several days, 7 5 this morning  Subjective  C/o neuropathic pain in both LE  Surgical pain is controlled  Feels tired today but no cardiac complaints  Reports good urine output  Review of Systems   Constitutional: Positive for malaise/fatigue  Negative for chills  Cardiovascular: Negative for chest pain, dyspnea on exertion, leg swelling, orthopnea, palpitations and syncope  Respiratory: Negative for cough, shortness of breath, sleep disturbances due to breathing and sputum production  Musculoskeletal: Negative for joint pain  Gastrointestinal: Positive for constipation  Negative for bloating and nausea     Neurological: Positive for paresthesias (neuropathy in b/l LE)  Negative for dizziness, light-headedness and weakness  Psychiatric/Behavioral: Negative for altered mental status  All other systems reviewed and are negative  Objective:   Vitals: Blood pressure (!) 109/46, pulse 66, temperature (!) 97 4 °F (36 3 °C), resp  rate 16, height 6' 1" (1 854 m), weight 127 kg (280 lb 6 8 oz), SpO2 92 %  , Body mass index is 37 kg/m²  ,     Systolic (31GCL), BGP:729 , Min:109 , IRH:042     Diastolic (73ZHX), HWU:08, Min:46, Max:59      Intake/Output Summary (Last 24 hours) at 2/4/2022 1057  Last data filed at 2/4/2022 1025  Gross per 24 hour   Intake 2136 ml   Output 2525 ml   Net -389 ml     Wt Readings from Last 3 Encounters:   02/04/22 127 kg (280 lb 6 8 oz)   01/24/22 131 kg (289 lb)   01/06/22 130 kg (286 lb)     Telemetry Review: N/a    Physical Exam  Vitals reviewed  Constitutional:       General: He is not in acute distress  Appearance: He is obese  Neck:      Vascular: No hepatojugular reflux or JVD  Cardiovascular:      Rate and Rhythm: Normal rate and regular rhythm  Heart sounds: Murmur heard  Systolic murmur is present  No friction rub  No gallop  Pulmonary:      Effort: No respiratory distress  Breath sounds: No rales  Comments: Decreased throughout, no significant rales  On 10L midflow cannula  Abdominal:      General: Bowel sounds are normal  There is no distension  Palpations: Abdomen is soft  Tenderness: There is no abdominal tenderness  Musculoskeletal:         General: No tenderness  Normal range of motion  Cervical back: Neck supple  Right lower leg: No edema  Left lower leg: No edema  Skin:     General: Skin is warm and dry  Capillary Refill: Capillary refill takes 2 to 3 seconds  Findings: No erythema  Neurological:      Mental Status: He is alert and oriented to person, place, and time     Psychiatric:         Mood and Affect: Mood normal      LABORATORY RESULTS      CBC with diff:   Results from last 7 days   Lab Units 02/04/22  0436 02/03/22  0535 02/02/22  0602 02/01/22  0456 01/31/22 2247 01/31/22  0510 01/30/22  0537 01/29/22  1016 01/29/22  1016   WBC Thousand/uL 9 89 8 18 10 63* 9 65  --  11 24* 13 15*  --  9 20   HEMOGLOBIN g/dL 7 5* 7 4* 7 5* 7 6*  --  8 1* 8 9*  --  9 9*   HEMATOCRIT % 25 7* 24 8* 24 9* 25 9*  --  27 1* 30 6*  --  33 7*   MCV fL 85 85 84 85  --  83 84  --  84   PLATELETS Thousands/uL 230 208 212 190 204 226 237   < > 238   MCH pg 24 8* 25 3* 25 3* 24 8*  --  24 7* 24 5*  --  24 8*   MCHC g/dL 29 2* 29 8* 30 1* 29 3*  --  29 9* 29 1*  --  29 4*   RDW % 17 6* 17 1* 16 4* 16 6*  --  17 0* 16 7*  --  16 7*   MPV fL 10 0 9 4 9 4 9 7 9 1 9 5 9 2   < > 8 8*   NRBC AUTO /100 WBCs 1  --  1 0  --  0  --   --  0    < > = values in this interval not displayed       CMP:  Results from last 7 days   Lab Units 02/04/22  0436 02/03/22  0535 02/02/22  0602 02/01/22 0456 01/31/22 2241 01/31/22  0510 01/30/22  0537   POTASSIUM mmol/L 4 0 3 7 3 7 4 2 4 3 3 9 4 2   CHLORIDE mmol/L 102 101 96* 98* 97* 101 101   CO2 mmol/L 28 28 27 26 28 26 26   BUN mg/dL 47* 51* 59* 52* 50* 49* 41*   CREATININE mg/dL 1 31* 1 59* 1 86* 1 87* 2 04* 2 16* 1 89*   CALCIUM mg/dL 9 6 8 9 8 8 8 7 8 8 8 8 9 1   AST U/L  --   --   --  28  --   --   --    ALT U/L  --   --   --  15  --   --   --    ALK PHOS U/L  --   --   --  53  --   --   --    EGFR ml/min/1 73sq m 50 40 33 32 29 27 32     BMP:  Results from last 7 days   Lab Units 02/04/22  0436 02/03/22  0535 02/02/22  0602 02/01/22  0456 01/31/22  2241 01/31/22  0510 01/30/22  0537   POTASSIUM mmol/L 4 0 3 7 3 7 4 2 4 3 3 9 4 2   CHLORIDE mmol/L 102 101 96* 98* 97* 101 101   CO2 mmol/L 28 28 27 26 28 26 26   BUN mg/dL 47* 51* 59* 52* 50* 49* 41*   CREATININE mg/dL 1 31* 1 59* 1 86* 1 87* 2 04* 2 16* 1 89*   CALCIUM mg/dL 9 6 8 9 8 8 8 7 8 8 8 8 9 1     Lab Results   Component Value Date    CREATININE 1 31 (H) 2022    CREATININE 1 59 (H) 2022    CREATININE 1 86 (H) 2022     Lab Results   Component Value Date    NTBNP 2,424 (H) 2021    NTBNP 1,892 (H) 2020    NTBNP 1,032 (H) 2020      Results from last 7 days   Lab Units 22  0535 22  0510   MAGNESIUM mg/dL 2 3 2 3     Lipid Profile:   Lab Results   Component Value Date    CHOL 137 2017     Lab Results   Component Value Date    HDL 47 2021    HDL 38 (L) 2019    HDL 52 04/10/2019     Lab Results   Component Value Date    LDLCALC 58 2021    LDLCALC 62 2019    LDLCALC 91 04/10/2019     Lab Results   Component Value Date    TRIG 44 2021    TRIG 117 2019    TRIG 70 04/10/2019     Cardiac testing:   Results for orders placed during the hospital encounter of 20    Echo complete with contrast if indicated    [address]  [address] [facility]  [phone]    Transthoracic Echocardiogram  2D, M-mode, Doppler, and Color Doppler    Study date:  14-Sep-2020    Patient: Gilson Min  MR number: [id number]  Account number: [de-identified]  : 1940  Age: [de-identified] years  Gender: Male  Status: Inpatient  Location: Bedside  Height: 73 in  Weight: 298 3 lb  BP: 120/ 68 mmHg    Diagnoses: I48 0 - Atrial fibrillation    Sonographer:  ROGER Gomez  Primary Physician:  Kalin Sotelo DO  Referring Physician:  Katerine Haile MD  Group:  [facility]  Interpreting Physician:  Katerine Haile MD    SUMMARY    LEFT VENTRICLE:  Systolic function was normal  Ejection fraction was estimated in the range of 55 % to 60 % to be 55 %  Although no diagnostic regional wall motion abnormality was identified, this possibility cannot be completely excluded on the basis of this study  Wall thickness was mildly to moderately increased  There was mild concentric hypertrophy      RIGHT VENTRICLE:  The ventricle was mildly dilated  LEFT ATRIUM:  The atrium was moderately to markedly dilated  RIGHT ATRIUM:  The atrium was mildly to moderately dilated  MITRAL VALVE:  There was moderate annular calcification  There was mild stenosis  Mean Gradient 6-7 mm of hg  There was mild regurgitation  AORTIC VALVE:  The valve was functionally bicuspid  Leaflets exhibited normal thickness and normal cuspal separation  Transaortic velocity was increased due to valvular stenosis  There was moderate to severe stenosis  Shima 1 0 cm2, peak gradient 65, mean 35 mm of hg    TRICUSPID VALVE:  There was mild to moderate regurgitation  Estimated peak PA pressure was 55 to 60 mmHg  The findings suggest moderate pulmonary hypertension  PULMONIC VALVE:  There was trace regurgitation  IVC, HEPATIC VEINS:  The inferior vena cava was mildly dilated  The respirophasic change in diameter was more than 50%  COMPARISONS:  Comparison was made with the previous study of 08-Apr-2019  Aortic stenosis has worsened  Pulmonary artery pressure has increased  HISTORY: PRIOR HISTORY: A  Flutter,A Fib ,chronic kidney disease,gout,heart failure,HTN,Pacemaker,pulmonary emphysema,sleep apnea,Angioplasty with stent placement,CAD,COPD,AAA  PROCEDURE: The procedure was performed at the bedside  This was a routine study  The transthoracic approach was used  The study included complete 2D imaging, M-mode, complete spectral Doppler, and color Doppler  The heart rate was 76 bpm,  at the start of the study  Images were obtained from the parasternal, apical, subcostal, and suprasternal notch acoustic windows  Intravenous contrast ( 0 4ml Definity in NSS) was administered to opacify the left ventricle and enhance  Doppler signals  Echocardiographic views were limited due to poor acoustic window availability, decreased penetration, and lung interference  This was a technically difficult study      LEFT VENTRICLE: Size was normal  Systolic function was normal  Ejection fraction was estimated in the range of 55 % to 60 % to be 55 %  Although no diagnostic regional wall motion abnormality was identified, this possibility cannot be  completely excluded on the basis of this study  Wall thickness was mildly to moderately increased  There was mild concentric hypertrophy  DOPPLER: The study was not technically sufficient to allow evaluation of LV diastolic function  RIGHT VENTRICLE: The ventricle was mildly dilated  Systolic function was normal     LEFT ATRIUM: The atrium was moderately to markedly dilated  RIGHT ATRIUM: The atrium was mildly to moderately dilated  A pacing wire was present  MITRAL VALVE: There was moderate annular calcification  There was mildly reduced leaflet separation  DOPPLER: Transmitral velocity was minimally increased  There was mild stenosis  Mean Gradient 6-7 mm of hg There was mild regurgitation  AORTIC VALVE: The valve was functionally bicuspid  Leaflets exhibited normal thickness and normal cuspal separation  DOPPLER: Transaortic velocity was increased due to valvular stenosis  There was moderate to severe stenosis  Shima 1 0 cm2,  peak gradient 65, mean 35 mm of hg There was no regurgitation  TRICUSPID VALVE: DOPPLER: There was mild to moderate regurgitation  Estimated peak PA pressure was 55 to 60 mmHg  The findings suggest moderate pulmonary hypertension  PULMONIC VALVE: DOPPLER: There was trace regurgitation  PERICARDIUM: There was no thickening or calcification  There was no pericardial effusion  AORTA: The root exhibited normal size  SYSTEMIC VEINS: IVC: The inferior vena cava was mildly dilated  The respirophasic change in diameter was more than 50%      SYSTEM MEASUREMENT TABLES    2D mode  AoR Diam 2D: 3 1 cm  LA Diam (2D): 5 3 cm  LA/Ao (2D): 1 71  FS (2D Teich): 25 7 %  IVSd (2D): 1 3 cm  LVDEV: 130 cmï¾³  LVESV: 64 7 cmï¾³  LVIDd(2D): 5 21 cm  LVISd (2D): 3 87 cm  LVOT Area 2D: 3 46 cmï¾²  LVPWd (2D): 1 33 cm  SV (Teich): 65 3 cmï¾³    Apical four chamber  LVEF A4C: 54 %    Unspecified Scan Mode  FLY Cont Eq (Peak Vitaly): 1 03 cmï¾²  LVOT Diam : 2 1 cm  LVOT Vmax: 1160 mm/s  LVOT Vmax; Mean: 1160 mm/s  Peak Grad ; Mean: 5 mm[Hg]  MV Peak A Vitaly: 434 mm/s  MV Peak E Vitaly   Mean: 1680 mm/s  MVA (PHT): 3 01 cmï¾²  PHT: 73 ms  Max P mm[Hg]  V Max: 2920 mm/s  Vmax: 3410 mm/s  TAPSE: 1 5 cm    [facility]    Prepared and electronically signed by    Brad Fisher MD  Signed 14-Sep-2020 13:14:06    Meds/Allergies   all current active meds have been reviewed and current meds:   Current Facility-Administered Medications   Medication Dose Route Frequency    acetaminophen (TYLENOL) tablet 975 mg  975 mg Oral Q8H [address]    albuterol (PROVENTIL HFA,VENTOLIN HFA) inhaler 2 puff  2 puff Inhalation Q6H PRN    allopurinol (ZYLOPRIM) tablet 200 mg  200 mg Oral Daily    ascorbic acid (VITAMIN C) tablet 500 mg  500 mg Oral Daily    aspirin chewable tablet 81 mg  81 mg Oral Daily    bisacodyl (DULCOLAX) rectal suppository 10 mg  10 mg Rectal Daily    calcium carbonate-vitamin D (OSCAL-D) 500 mg-200 units per tablet 2 tablet  2 tablet Oral HS    cholecalciferol (VITAMIN D3) tablet 1,000 Units  1,000 Units Oral Daily    cyanocobalamin (VITAMIN B-12) tablet 100 mcg  100 mcg Oral Daily    DULoxetine (CYMBALTA) delayed release capsule 60 mg  60 mg Oral Daily    finasteride (PROSCAR) tablet 5 mg  5 mg Oral Daily    fluticasone-vilanterol (BREO ELLIPTA) 200-25 MCG/INH inhaler 1 puff  1 puff Inhalation Daily    furosemide (LASIX) injection 40 mg  40 mg Intravenous BID (diuretic)    glucosamine sulfate capsule 500 mg  500 mg Oral BID    guaiFENesin (MUCINEX) 12 hr tablet 600 mg  600 mg Oral Q12H JOSE    iron sucrose (VENOFER) 200 mg in sodium chloride 0 9 % 100 mL IVPB  200 mg Intravenous Daily    levalbuterol (XOPENEX) inhalation solution 0 63 mg  0 63 mg Nebulization Q8H PRN    levalbuterol (XOPENEX) inhalation solution 1 25 mg  1 25 mg Nebulization BID    naloxone (NARCAN) 0 04 mg/mL syringe 0 04 mg  0 04 mg Intravenous Q1MIN PRN    oxyCODONE (ROXICODONE) IR tablet 2 5 mg  2 5 mg Oral Q4H PRN    oxyCODONE (ROXICODONE) IR tablet 5 mg  5 mg Oral Q4H PRN    polyethylene glycol (MIRALAX) packet 17 g  17 g Oral Daily    pravastatin (PRAVACHOL) tablet 40 mg  40 mg Oral Daily With Dinner    predniSONE tablet 60 mg  60 mg Oral Daily    rivaroxaban (XARELTO) tablet 15 mg  15 mg Oral HS    senna-docusate sodium (SENOKOT S) 8 6-50 mg per tablet 1 tablet  1 tablet Oral BID    sodium chloride 0 9 % inhalation solution 3 mL  3 mL Nebulization BID     Medications Prior to Admission   Medication    albuterol (Ventolin HFA) 90 mcg/act inhaler    allopurinol (ZYLOPRIM) 100 mg tablet    ascorbic acid (VITAMIN C) 500 mg tablet    B Complex Vitamins (B COMPLEX 1 PO)    Biotin (BIOTIN 5000) 5 MG CAPS    calcium carbonate-vitamin D (OSCAL-D) 500 mg-200 units per tablet    cholecalciferol (VITAMIN D3) 1,000 units tablet    clopidogrel (PLAVIX) 75 mg tablet    colchicine (COLCRYS) 0 6 mg tablet    Cranberry 1000 MG CAPS    cyanocobalamin 1000 MCG tablet    dextromethorphan-guaiFENesin (ROBITUSSIN DM)  mg/5 mL syrup    docusate sodium (COLACE) 100 mg capsule    DULoxetine (CYMBALTA) 60 mg delayed release capsule    felodipine (PLENDIL) 5 mg 24 hr tablet    finasteride (PROSCAR) 5 mg tablet    Flaxseed, Linseed, (EQL FLAX SEED OIL) 1000 MG CAPS    fluocinonide (LIDEX) 0 05 % cream    furosemide (LASIX) 40 mg tablet    gabapentin (Neurontin) 300 mg capsule    glucosamine-chondroitin 500-400 MG tablet    rivaroxaban (XARELTO) 15 mg tablet    simvastatin (ZOCOR) 20 mg tablet    spironolactone (ALDACTONE) 25 mg tablet    Farxiga 5 MG TABS    fluticasone-umeclidinium-vilanterol (Trelegy Ellipta) 200-62 5-25 MCG/INH AEPB inhaler     Counseling / Coordination of Care  Total floor / unit time spent today 20 minutes  Greater than 50% of total time was spent with the patient and / or family counseling and / or coordination of care  ** Please Note: Dragon 360 Dictation voice to text software may have been used in the creation of this document   **

## 2022-02-04 NOTE — ASSESSMENT & PLAN NOTE
- Patient with chronic pain syndrome   - Continue multimodal analgesic regimen with additional agents added/adjustments made in setting of acute pain secondary to traumatic injury  - Bowel regimen while on opioid therapy  - APS consult to assist with acute on chronic pain  - Outpatient follow-up with PCP and pain provider

## 2022-02-04 NOTE — PROGRESS NOTES
NEPHROLOGY PROGRESS NOTE   Kalee Rubinman 80 y o  male MRN: [id number]  Unit/Bed#: W -01 Encounter: [id number]    ASSESSMENT & PLAN:  80-year-old male with history of chronic kidney disease, hypertension, diastolic CHF, aortic stenosis, gout, CAD was admitted after presenting with fall and found to have right proximal femoral fracture   Nephrology consulted for acute kidney injury     Acute kidney injury on chronic kidney disease stage 3  -baseline creatinine 1 3-1 5 and follows with Penelope Hendrickson  -admission creatinine 1 7, received IV Lasix on 01/30 in the setting of fluid overload, creatinine trended up to 2 1 on 01/31 after which diuretics held and renal function improved and now patient back on diuretics  -underwent right proximal femur surgical repair on 01/31, receive pre and post surgery IV fluid   -elevated creatinine/acute kidney injury likely cardiorenal syndrome and from prerenal due to low blood pressure, further worsened in the setting of diuretics on January 30th  Renal function improved post surgery on January 31st   -was started on IV Lasix by Cardiology on 02/03 for fluid overload  -on blood work from 02/04, creatinine further improving to 1 3 mg/dL with stable electrolytes  Has good urine output, 1 7 L  Continue diuretics per Cardiology  Okay for transition to oral diuretics from Nephrology side  Continue to wean oxygen as tolerated  -Avoid hypotension and nephrotoxins, dose medications per GFR     Fluid overload/acute on chronic diastolic CHF:    -Ejection fraction 55% with moderate to severe aortic stenosis and pulmonary hypertension  -status post 2 doses of IV Lasix on 01/30   -on January 31st, received pre and post surgery IV fluids   -Chest x-ray from 02/02 was personally reviewed by me, finding of right pleural effusion and pulmonary congestion noted  -Currently on Lasix 40 mg IV twice daily has good urine output, continue diuretics per Cardiology    Possible transition to Lasix 40 mg twice daily which is his previous home dose tomorrow but will defer further recommendations to Cardiology     Hyponatremia:   resolved, sodium level 138, continue to monitor      Primary hypertension, currently on felodipine with hold parameters-stop felodipine on 02/04 as blood pressure has been running low  Avoid hypotension     Anemia iron deficiency with iron saturation 11%:  Hemoglobin trending down to 7 5 gram/deciliter, continue to monitor per primary team   Ordered for IV Venofer 200 mg daily x3 doses     Closed fracture of right femur, status post insertion of IM nail on January 31, 2022 continue postop care per Orthopedics     Others chronic AFib/severe aortic stenosis follows outpatient with Dr Elliott    Discussed with primary team advanced practitioner  Overall stable from Nephrology side for outpatient care once oxygen requirement improves further with diuresis  SUBJECTIVE:  No new complaints, oxygen requirement improving to 3 L by nasal cannula  OBJECTIVE:  Current Weight: Weight - Scale: 127 kg (280 lb 6 8 oz)  Vitals:    02/04/22 0900   BP:    Pulse:    Resp:    Temp:    SpO2: 92%   BP (!) 109/46   Pulse 66   Temp (!) 97 4 °F (36 3 °C)   Resp 16   Ht 6' 1" (1 854 m)   Wt 127 kg (280 lb 6 8 oz)   SpO2 92%   BMI 37 00 kg/m²       Intake/Output Summary (Last 24 hours) at 2/4/2022 1045  Last data filed at 2/4/2022 1025  Gross per 24 hour   Intake 2136 ml   Output 2525 ml   Net -389 ml       Physical Exam   General:  Ill looking, awake  On oxygen by nasal cannula at 3 liters/minute  Eyes: Conjunctivae pink,  Sclera anicteric  ENT: lips and mucous membranes moist  Neck: supple   Chest: Clear to Auscultation both lungs,  no crackles, ronchus or wheezing    CVS: S1 & S2 present, normal rate, regular rhythm, has ejection systolic murmur  Abdomen: soft, non-tender, non-distended, Bowel sounds normoactive  Extremities: no edema of  legs  Skin: no rash  Neuro: awake, alert, oriented x3  Psych: Mood and affect appropriate     Medications:    Current Facility-Administered Medications:     acetaminophen (TYLENOL) tablet 975 mg, 975 mg, Oral, Q8H [facility], Abigail Prieto MD, 975 mg at 02/04/22 0530    albuterol (PROVENTIL HFA,VENTOLIN HFA) inhaler 2 puff, 2 puff, Inhalation, Q6H PRN, Abigail Prieto MD, 2 puff at 01/30/22 1136    allopurinol (ZYLOPRIM) tablet 200 mg, 200 mg, Oral, Daily, Abigail Prieto MD, 200 mg at 02/04/22 2000    ascorbic acid (VITAMIN C) tablet 500 mg, 500 mg, Oral, Daily, Abigail Prieto MD, 500 mg at 02/04/22 7484    aspirin chewable tablet 81 mg, 81 mg, Oral, Daily, NAVA Blanchard, 81 mg at 02/04/22 1809    bisacodyl (DULCOLAX) rectal suppository 10 mg, 10 mg, Rectal, Daily, NAVA Mo, 10 mg at 02/04/22 9423    calcium carbonate-vitamin D (OSCAL-D) 500 mg-200 units per tablet 2 tablet, 2 tablet, Oral, HS, Abigail Prieto MD, 2 tablet at 02/03/22 2119    cholecalciferol (VITAMIN D3) tablet 1,000 Units, 1,000 Units, Oral, Daily, Abigail Prieto MD, 1,000 Units at 02/04/22 0836    cyanocobalamin (VITAMIN B-12) tablet 100 mcg, 100 mcg, Oral, Daily, Abigail Prieto MD, 100 mcg at 02/04/22 0836    DULoxetine (CYMBALTA) delayed release capsule 60 mg, 60 mg, Oral, Daily, Abigail Prieto MD, 60 mg at 02/04/22 0836    felodipine (PLENDIL) 24 hr tablet 5 mg, 5 mg, Oral, Daily, Abigail Prieto MD, 5 mg at 02/04/22 9721    finasteride (PROSCAR) tablet 5 mg, 5 mg, Oral, Daily, Abigail Prieto MD, 5 mg at 02/04/22 0836    fluticasone-vilanterol (BREO ELLIPTA) 200-25 MCG/INH inhaler 1 puff, 1 puff, Inhalation, Daily, Abigail Prieto MD, 1 puff at 02/04/22 0906    furosemide (LASIX) injection 40 mg, 40 mg, Intravenous, BID (diuretic), Derotha Maxwell, CRNP, 40 mg at 02/04/22 0836    glucosamine sulfate capsule 500 mg, 500 mg, Oral, BID, Abigail Prieto MD, 500 mg at 02/04/22 0836    guaiFENesin (MUCINEX) 12 hr tablet 600 mg, 600 mg, Oral, Q12H [facility], NAVA Pang, 600 mg at 02/04/22 0593    [facility]) inhalation solution 0 63 mg, 0 63 mg, Nebulization, Q8H PRN, Kaur Reeves MD, 0 63 mg at 01/30/22 0961    [facility] solution 1 25 mg, 1 25 mg, Nebulization, BID, Kaur Reeves MD, 1 25 mg at 02/04/22 0724    naloxone (NARCAN) 0 04 mg/mL syringe 0 04 mg, 0 04 mg, Intravenous, Q1MIN PRN, Kaur Reeves MD    oxyCODONE (ROXICODONE) IR tablet 2 5 mg, 2 5 mg, Oral, Q4H PRN, Kaur Reeves MD, 2 5 mg at 02/03/22 0558    oxyCODONE (ROXICODONE) IR tablet 5 mg, 5 mg, Oral, Q4H PRN, Kaur Reeves MD, 5 mg at 02/02/22 1011    polyethylene glycol (MIRALAX) packet 17 g, 17 g, Oral, Daily, NAVA Pang, 17 g at 02/04/22 7743    pravastatin (PRAVACHOL) tablet 40 mg, 40 mg, Oral, Daily With Samm Earl MD, 40 mg at 02/03/22 1517    predniSONE tablet 60 mg, 60 mg, Oral, Daily, NAVA Pang, 60 mg at 02/04/22 5355    rivaroxaban (XARELTO) tablet 15 mg, 15 mg, Oral, HS, NAVA Pang, 15 mg at 02/03/22 2119    senna-docusate sodium (SENOKOT S) 8 6-50 mg per tablet 1 tablet, 1 tablet, Oral, BID, Kaur Reeves MD, 1 tablet at 02/04/22 0836    sodium chloride 0 9 % inhalation solution 3 mL, 3 mL, Nebulization, BID, Kaur Reeves MD, 3 mL at 02/04/22 0724    Invasive Devices:        Lab Results:   Results from last 7 days   Lab Units 02/04/22  0436 02/03/22  0535 02/02/22  0602 02/01/22  0456 02/01/22  0456 01/31/22  2241 01/31/22  0510   WBC Thousand/uL 9 89 8 18 10 63*   < > 9 65  --  11 24*   HEMOGLOBIN g/dL 7 5* 7 4* 7 5*   < > 7 6*  --  8 1*   HEMATOCRIT % 25 7* 24 8* 24 9*   < > 25 9*  --  27 1*   PLATELETS Thousands/uL 230 208 212   < > 190   < > 226   POTASSIUM mmol/L 4 0 3 7 3 7   < > 4 2   < > 3 9   CHLORIDE mmol/L 102 101 96*   < > 98*   < > 101   CO2 mmol/L 28 28 27   < > 26   < > 26   BUN mg/dL 47* 51* 59*   < > 52*   < > 49* CREATININE mg/dL 1 31* 1 59* 1 86*   < > 1 87*   < > 2 16*   CALCIUM mg/dL 9 6 8 9 8 8   < > 8 7   < > 8 8   MAGNESIUM mg/dL  --  2 3  --   --   --   --  2 3   ALK PHOS U/L  --   --   --   --  53  --   --    ALT U/L  --   --   --   --  15  --   --    AST U/L  --   --   --   --  28  --   --     < > = values in this interval not displayed  Previous work up:         Portions of the record may have been created with voice recognition software  Occasional wrong word or "sound a like" substitutions may have occurred due to the inherent limitations of voice recognition software  Read the chart carefully and recognize, using context, where substitutions have occurred  If you have any questions, please contact the dictating provider

## 2022-02-04 NOTE — OCCUPATIONAL THERAPY NOTE
Occupational Therapy Treatment Note      Hosea Ware    2/4/2022    Principal Problem:    Closed fracture of right femur ([address] )  Active Problems:    Chronic pain syndrome    Pain in both lower extremities    Benign hypertension with chronic kidney disease, stage III ([facility])    Acute kidney injury superimposed on chronic kidney disease (HCC)    Chronic diastolic CHF (congestive heart failure) (HCC)    Chronic a-fib ([facility])    Lumbar radiculopathy    Hyponatremia    Stage 3a chronic kidney disease ([facility])    CAD (coronary artery disease)    Fall      Past Medical History:   Diagnosis Date    Arthritis     Atrial flutter ([facility])     Chronic kidney disease     stage 3    Coronary artery disease     2 stents    Fluid retention     Gout     Heart failure ([facility])     pacemaker    Hypertension     Pacemaker     Pulmonary emphysema ([address] )     Radiculopathy     last assessed 1/28/16     Shortness of breath     exertion    Sleep apnea     c pap       Past Surgical History:   Procedure Laterality Date    ANGIOPLASTY      x2 2 stents and then replaced    CARDIAC PACEMAKER PLACEMENT      pacemaker permanent placement dual chamber / last assessed 4/7/14 / implantation     CARDIAC SURGERY      pacemaker    CHOLECYSTECTOMY      CORONARY ANGIOPLASTY WITH STENT PLACEMENT      EPIDURAL BLOCK INJECTION N/A 5/26/2016    Procedure: BLOCK / INJECTION EPIDURAL STEROID LUMBAR  L4-5;  Surgeon: Jeramie France MD;  Location: [place] [address] MAIN OR;  Service:     EPIDURAL BLOCK INJECTION N/A 2/14/2019    Procedure: L4 L5 Lumbar Epidural Steroid Injection;  Surgeon: Jeramie France MD;  Location: [place] [address] MAIN OR;  Service: Pain Management     EYE SURGERY      cataract left    KNEE ARTHROSCOPY W/ MENISCAL REPAIR Left     LUMBAR EPIDURAL INJECTION N/A 3/17/2016    Procedure: BLOCK / INJECTION LUMBAR  L4-5  (C-ARM);   Surgeon: Jeramie France MD;  Location[facility] MAIN OR;  Service:     IN OPEN RX FEMUR FX+INTRAMED ELLA Right 1/31/2022 Procedure: INSERTION NAIL IM FEMUR ANTEGRADE (TROCHANTERIC); Surgeon: Emmie Santiago MD;  [facility];  Service: Orthopedics        02/04/22 3023   OT Last Visit   OT Visit Date 02/04/22   Note Type   Note Type Treatment   Restrictions/Precautions   Weight Bearing Precautions Per Order Yes   RLE Weight Bearing Per Order WBAT   Other Precautions Chair Alarm; Fall Risk;Pain  (5L O2 via NC)   General   Family/Caregiver Present spouse present t/o tx session  Lifestyle   Intrinsic Gratification Pt made mention enjoys playing [employer]  Pain Assessment   Pain Assessment Tool 0-10   Pain Score No Pain   ADL   Where Assessed Edge of bed   LB Bathing Assistance 2  Maximal Assistance   LB Bathing Comments Able to pull up left sock but unable to adjust R sock  OT encouraged pt to continue to try and progress toward donning socks using technique utilized prior to hospital admission  OT educated on how attempting prior techniques will increase strength and ROM  Toileting Assistance  2  Maximal Assistance   Toileting Deficit Clothing management up;Clothing management down;Perineal hygiene   Toileting Comments use of quick move to use BSC  OT assiting to pull gown away from body prior to ppt sitting on BSC  Pt maintaining standing while OT completing reynaldo care  Pt was not successful in having a BM but did express having the need to have a BM  CNA/RN staff made aware of pt's attempt  Functional Standing Tolerance   Time 15sec, 30sec, 30sec   Activity Standing in prep for therpeutic activity   Comments Pt fatigues quickly in standing      Bed Mobility   Additional Comments bed mobility not assessed 2' pt sitting EOB upon PT/OT arrival     Transfers   Sit to Stand 2  Maximal assistance   Additional items Assist x 2   Stand to Sit 2  Maximal assistance   Additional items Assist x 2   Toilet transfer 2  Maximal assistance   Additional items Assist x 2;Commode   Functional Mobility   Additional Comments not appropriate on this date  See PT tx session for details as pt did perform weight shifting while in standing  Toilet Transfers   Toilet Transfer From Other (Comment)  (quick move)   Toilet Transfer Type To   Toilet Transfer to Extra wide bedside commode   Toilet Transfers Dependent  (use of quick move)   Toilet Transfers Comments Pt expressed feeling much better having the ability to use the [facility] rather than bedpan  Cognition   Overall Cognitive Status WFL   Arousal/Participation Alert; Cooperative   Attention Within functional limits   Orientation Level Oriented X4   Memory Within functional limits   Following Commands Follows one step commands with increased time or repetition   Comments Pt verified ID by stating name and   Pt did made mention of feeling as though unable to recall how to play [employer] despite being an avid solitiaire player for many years  OT discussed with pt that he could be feeling "foggy" from medications  RN made aware  Activity Tolerance   Activity Tolerance Patient tolerated treatment well   Medical Staff Made Aware RN cleared pt for session  Assessment   Assessment Pt seen at bedside for skilled OT tx session  It should be noted that pt participated in a co-tx session with PT 2' need or 2 skilled therapists for transfers as well as education  Furthermore, pt with limited activity tolerance and therefore co-tx maximized pt's overall performance on this date  Pt was eager and cooperative t/o session  Multiple seated supervised rest breaks needed following sit<>stand trials with cues needed for PLB as pt with tendency to mouth breathe heavily after exerting self  SpO2 dec'd to 85% at one point during session however this may have been a poor reading due to pt gripping RW as pt did maintain saturations above 92% t/o majority of session  Pt benefits from continued positive reinforcement and noted feeling pleased to hear progress made on this date   Use of quick move as pt not yet appropriate to advance to other seating surface areas on own power or even with the assist of staff due to significant dec'd standing tolerance  Recommend staff to continue to utilize quick move to assist pt to transfer other seating surfaces- especially for use of BSC rather than bedpan  Pt noted feeling constipated and sitting on BSC beneficial in terms of positioning to encourage a bowel movement  Pt demonstrates good sitting balance on this date and with some improvement re: LB self care  Pt would benefit from continued OT services to address deficits listed in evaluation and to continue to work toward goals put in place at evaluation  Plan   Treatment Interventions ADL retraining;Functional transfer training; Endurance training;Patient/family training;Equipment evaluation/education; Activityengagement;Continued evaluation;UE strengthening/ROM   Goal Expiration Date 02/11/22   OT Treatment Day 1   OT Frequency 3-5x/wk   Recommendation   OT Discharge Recommendation Post acute rehabilitation services   OT - OK to Discharge Yes  (once medically cleared)   Additional Comments  Pt returned to bed with needs/call bell in reach at the end of session      AM-PAC Daily Activity Inpatient   Lower Body Dressing 2   Bathing 2   Toileting 1   Upper Body Dressing 4   Grooming 4   Eating 4   Daily Activity Raw Score 17   Daily Activity Standardized Score (Calc for Raw Score >=11) 37 26   AM-PAC Applied Cognition Inpatient   Following a Speech/Presentation 4   Understanding Ordinary Conversation 4   Taking Medications 3   Remembering Where Things Are Placed or Put Away 3   Remembering List of 4-5 Errands 3   Taking Care of Complicated Tasks 3   Applied Cognition Raw Score 20   Applied Cognition Standardized Score 41 76     Kiara Dsouza, OT

## 2022-02-04 NOTE — PROGRESS NOTES
Progress Note - Orthopedics   Justino Greencastle 80 y o  male MRN: [id number]  Unit/Bed#: W -01      Subjective:    81 y o male seen evaluated this morning  He notes no pain in the right hip  Still complains of lower leg pain that he attributes to his peripheral neuropathy    No chest pain or shortness of breath     0   Lab Value Date/Time    HCT 25 7 (L) 02/04/2022 0436    HCT 24 8 (L) 02/03/2022 0535    HCT 24 9 (L) 02/02/2022 0602    HCT 45 9 01/07/2017 0834    HCT 43 8 07/16/2016 0752    HCT 41 0 04/07/2016 1141    HGB 7 5 (L) 02/04/2022 0436    HGB 7 4 (L) 02/03/2022 0535    HGB 7 5 (L) 02/02/2022 0602    HGB 15 3 01/07/2017 0834    HGB 14 9 07/16/2016 0752    HGB 13 4 04/07/2016 1141    INR 1 57 (H) 11/21/2021 1330    WBC 9 89 02/04/2022 0436    WBC 8 18 02/03/2022 0535    WBC 10 63 (H) 02/02/2022 0602    WBC 8 40 03/20/2018 0000    WBC 11-30 (A) 06/15/2017 1023    WBC 9 9 01/07/2017 0834    WBC 9 2 07/16/2016 0752       Meds:    Current Facility-Administered Medications:     acetaminophen (TYLENOL) tablet 975 mg, 975 mg, Oral, Q8H [facility], Halina Ty MD, 975 mg at 02/04/22 0530    albuterol (PROVENTIL HFA,VENTOLIN HFA) inhaler 2 puff, 2 puff, Inhalation, Q6H PRN, Halina Ty MD, 2 puff at 01/30/22 1136    allopurinol (ZYLOPRIM) tablet 200 mg, 200 mg, Oral, Daily, Halina Ty MD, 200 mg at 02/04/22 6678    ascorbic acid (VITAMIN C) tablet 500 mg, 500 mg, Oral, Daily, Halina Ty MD, 500 mg at 02/04/22 0206    aspirin chewable tablet 81 mg, 81 mg, Oral, Daily, NAVA Blanchard, 81 mg at 02/04/22 5301    bisacodyl (DULCOLAX) rectal suppository 10 mg, 10 mg, Rectal, Daily, NAVA Bowling, 10 mg at 02/04/22 6230    calcium carbonate-vitamin D (OSCAL-D) 500 mg-200 units per tablet 2 tablet, 2 tablet, Oral, HS, Halina Ty MD, 2 tablet at 02/03/22 2119    cholecalciferol (VITAMIN D3) tablet 1,000 Units, 1,000 Units, Oral, Daily, Halina Ty MD, 1,000 Units at 02/04/22 0836    cyanocobalamin (VITAMIN B-12) tablet 100 mcg, 100 mcg, Oral, Daily, Anna Mohr MD, 100 mcg at 02/04/22 0836    DULoxetine (CYMBALTA) delayed release capsule 60 mg, 60 mg, Oral, Daily, Anna Mohr MD, 60 mg at 02/04/22 0836    finasteride (PROSCAR) tablet 5 mg, 5 mg, Oral, Daily, Anna Mohr MD, 5 mg at 02/04/22 0836    fluticasone-vilanterol (BREO ELLIPTA) 200-25 MCG/INH inhaler 1 puff, 1 puff, Inhalation, Daily, Anna Mohr MD, 1 puff at 02/04/22 0906    furosemide (LASIX) injection 40 mg, 40 mg, Intravenous, BID (diuretic), Ether Milks, CRNP, 40 mg at 02/04/22 0836    [START ON 2/5/2022] furosemide (LASIX) tablet 20 mg, 20 mg, Oral, QPM, NAVA Barnhart    [START ON 2/5/2022] furosemide (LASIX) tablet 40 mg, 40 mg, Oral, Daily, Ether Patricias, CRNP    glucosamine sulfate capsule 500 mg, 500 mg, Oral, BID, Anna Mohr MD, 500 mg at 02/04/22 0836    guaiFENesin (MUCINEX) 12 hr tablet 600 mg, 600 mg, Oral, Q12H [address], NAVA Harding, 600 mg at 02/04/22 3620    iron sucrose (VENOFER) 200 mg in sodium chloride 0 9 % 100 mL IVPB, 200 mg, Intravenous, Daily, Gretchen Hernandez MD    levalbuterol [facility]) inhalation solution 0 63 mg, 0 63 mg, Nebulization, Q8H PRN, Anna Mohr MD, 0 63 mg at 01/30/22 0560    levalbuterol (XOPENEX) inhalation solution 1 25 mg, 1 25 mg, Nebulization, BID, Anna Mohr MD, 1 25 mg at 02/04/22 0724    naloxone (NARCAN) 0 04 mg/mL syringe 0 04 mg, 0 04 mg, Intravenous, Q1MIN PRN, Anna Mohr MD    oxyCODONE (ROXICODONE) IR tablet 2 5 mg, 2 5 mg, Oral, Q4H PRN, Anna Mohr MD, 2 5 mg at 02/03/22 0558    oxyCODONE (ROXICODONE) IR tablet 5 mg, 5 mg, Oral, Q4H PRN, Anna Mohr MD, 5 mg at 02/02/22 1011    polyethylene glycol (MIRALAX) packet 17 g, 17 g, Oral, Daily, NAVA Harding, 17 g at 02/04/22 6505    pravastatin (PRAVACHOL) tablet 40 mg, 40 mg, Oral, Daily With Tricia Law Ronda Becker MD, 40 mg at 02/03/22 1517    predniSONE tablet 60 mg, 60 mg, Oral, Daily, NAVA Aragon, 60 mg at 02/04/22 8111    rivaroxaban (XARELTO) tablet 15 mg, 15 mg, Oral, HS, NAVA Aragon, 15 mg at 02/03/22 2119    senna-docusate sodium (SENOKOT S) 8 6-50 mg per tablet 1 tablet, 1 tablet, Oral, BID, Leticia Mayorga MD, 1 tablet at 02/04/22 0836    sodium chloride 0 9 % inhalation solution 3 mL, 3 mL, Nebulization, BID, Leticia Mayorga MD, 3 mL at 02/04/22 7519    Blood Culture:   Lab Results   Component Value Date    BLOODCX No Growth After 5 Days  09/13/2020    BLOODCX No Growth After 5 Days  09/13/2020       Wound Culture:   No results found for: WOUNDCULT    Ins and Outs:  I/O last 24 hours: In: 2376 [P O :2376]  Out: 1219 [Urine:2775]          Physical:  Vitals:    02/04/22 0900   BP:    Pulse:    Resp:    Temp:    SpO2: 92%     Musculoskeletal: right Lower Extremity  · Dressing clean dry intact  · No excessive soft tissue swelling  · No significant tenderness   · SILT s/s/sp/dp/t    · +fhl/ehl, +ankle dorsi/plantar flexion  ·   2+ DP pulse    Assessment:    81 y o male POD 4 s/p right short TFN      Plan:  · WBAT RLE  · Hgb 7 5 today   Continue to monitor, vitals stable    · Medical mgmt per primary team  · PT/OT  · Pain control per primary service  · DVT ppx  · Dispo: ortho stable for discharge  · Follow-up with Dr Chloe Asher 2 weeks from date of surgery    Stefan Coleman PA-C

## 2022-02-04 NOTE — PLAN OF CARE
Problem: PHYSICAL THERAPY ADULT  Goal: Performs mobility at highest level of function for planned discharge setting  See evaluation for individualized goals  Description: Treatment/Interventions: Functional transfer training,LE strengthening/ROM,Therapeutic exercise,Endurance training,Cognitive reorientation,Patient/family training,Equipment eval/education,Bed mobility (PT to see for gait when appropriate)          See flowsheet documentation for full assessment, interventions and recommendations  2/4/2022 1636 by Fritz Roy PT  Outcome: Progressing  Note: Prognosis: Fair  Problem List: Decreased strength,Decreased endurance,Impaired balance,Decreased mobility,Decreased safety awareness,Obesity,Orthopedic restrictions,Pain  Assessment: Pt seen today for PT intervention w/ pt agreeable to participate and motivated to perform OOB mobility  Pt sitting at EOB upon arrival and demonstrated to maintain static sitting balance at EOB w/ supervision for multiple trials  Pt continues to require Max Ax2 for STS transfer trials w/ VC for hand placement, transfer technique, and to improve upright standing position/posture  Compared to previous session, pt w/ increase standing tolerance and demonstrated ability to weight-shifting to clear BLE from floor x3 each LE w/ Max Ax2 and BUE on RW  Pt continues to be unable to initiate ambulation due to inability to anteriorly advance LE in a standing position  Additional STS transfer trials performed using [employer] for mobility from EOB>commode>chair to allow pt a change in body positioning  Pt remains mobility below baseline level and will continue to benefit from skilled PT intervention to increase pt's independence w/ functional mobility, decrease pt's burden of care, and to promote progress towards pt's PLOF  DC rec: post acute rehab           PT Discharge Recommendation: Post acute rehabilitation services          See flowsheet documentation for full assessment

## 2022-02-04 NOTE — ASSESSMENT & PLAN NOTE
Lab Results   Component Value Date    EGFR 50 02/04/2022    EGFR 40 02/03/2022    EGFR 33 02/02/2022    CREATININE 1 31 (H) 02/04/2022    CREATININE 1 59 (H) 02/03/2022    CREATININE 1 86 (H) 02/02/2022     - Patient with history of chronic hypertension   - Monitor blood pressures with goal systolic pressure less than 160  - Appreciate Cardiology and Nephrology evaluation and recommendations   - Outpatient follow-up with PCP per routine

## 2022-02-04 NOTE — ASSESSMENT & PLAN NOTE
Wt Readings from Last 3 Encounters:   02/02/22 105 kg (231 lb 7 7 oz)   01/24/22 131 kg (289 lb)   01/06/22 130 kg (286 lb)     - Chronic history of diastolic CHF with evidence of acute pulmonary edema  - cardiology and Nephrology were consulted and note appreciated  - echocardiogram 1/31:  EF 65%  - SHAWNEE improved today to baseline kidney function  Continue home diuresis  - Patient was able to hold a conversation with me on room air this morning with O2 saturations 92%, no shortness of breath or labored breathing  Patient does not appear to be fluid overloaded  - continue to wean oxygen as able to maintain oxygen saturation greater than 93%    - consider pulmonology consultation if oxygen requirements persist

## 2022-02-04 NOTE — ASSESSMENT & PLAN NOTE
Lab Results   Component Value Date    EGFR 50 02/04/2022    EGFR 40 02/03/2022    EGFR 33 02/02/2022    CREATININE 1 31 (H) 02/04/2022    CREATININE 1 59 (H) 02/03/2022    CREATININE 1 86 (H) 02/02/2022     - Patient with chronic history of CKD stage 3 with baseline creatinine appearing to be between 1 4 and 1 5   - SHAWNEE resolved  - continue home Lasix  - nephrology and cardiology consulted and notes appreciated

## 2022-02-04 NOTE — ASSESSMENT & PLAN NOTE
Lab Results   Component Value Date    EGFR 50 02/04/2022    EGFR 40 02/03/2022    EGFR 33 02/02/2022    CREATININE 1 31 (H) 02/04/2022    CREATININE 1 59 (H) 02/03/2022    CREATININE 1 86 (H) 02/02/2022     - Patient with chronic history of CKD stage 3 with baseline creatinine appearing to be between 1 4 and 1 5   - creatinine improved, returning to baseline  - appreciate Nephrology consult and recommendations  - avoid hypotension

## 2022-02-04 NOTE — ASSESSMENT & PLAN NOTE
- Acute right intertrochanteric femur fracture, present on admission   - Appreciate Orthopedic surgery evaluation and recommendations   - 1/31 right femur IM nail  - Maintain WBAT RLE  - Monitor right lower extremity neurovascular exam   - Continue multimodal analgesic regimen-pain controlled  - DVT prophylaxis--Xarelto  - PT and OT evaluation and treatment as indicated  - follow-up with orthopedics as an outpatient

## 2022-02-04 NOTE — ASSESSMENT & PLAN NOTE
- Acute right intertrochanteric femur fracture, present on admission   - Appreciate Orthopedic surgery evaluation and recommendations   - 1/31 right femur IM nail  - Maintain WBAT RLE  - Patient's respiratory status has improved with multiple interventions and cardiology, nephrology have evaluated the patient  - Monitor right lower extremity neurovascular exam   - Continue multimodal analgesic regimen-pain controlled  - DVT prophylaxis--Xarelto  - PT and OT evaluation and treatment as indicated  - follow-up with orthopedics as an outpatient

## 2022-02-04 NOTE — ASSESSMENT & PLAN NOTE
- Patient with chronic significant history of CAD  Patient follows with Dr Shirin Crews  - Appreciate [facility]'s Cardiology evaluation and recommendations including discontinuing Plavix and continuing aspirin on discharge  - No evidence of acute cardiac event at this time  - EKG indicates ventricular pacing rhythm  Pacemaker was interrogated, showing 6 months of battery life left  - Continue home medication therapy as appropriate      - Follow up with Dr Shirin Crews outpatient

## 2022-02-04 NOTE — ASSESSMENT & PLAN NOTE
- Patient with chronic lumbar radiculopathy and pain in his legs  Patient reports acutely worsened pain in his calves and feet since his fall   - Neurovascular exam intact on initial evaluation   - T and L-spine x-rays indicate no fractures  - patient has been complaining of worsening lower extremity pain for the past 3 days  - Lower extremity duplex negative  - Continue multimodal analgesic regimen including gabapentin  APS consulted  - Outpatient follow-up with PCP and pain provider

## 2022-02-04 NOTE — ASSESSMENT & PLAN NOTE
· Patient complaining of calf pain  · History of lumbar radiculopathy and chronic lower extremity pain and notes that his right lower extremity pain is worse than usual   · Neurovascularly intact    · Negative Homans sign  · Bilateral lower extremity duplex negative for acute or chronic thrombus  · Chronic

## 2022-02-04 NOTE — PHYSICAL THERAPY NOTE
PHYSICAL THERAPY TREATMENT NOTE          Patient Name: Nia Andersen  FAOJS'K Date: 22 1354   PT Last Visit   PT Visit Date 22   Note Type   Note Type Treatment   Pain Assessment   Pain Location/Orientation Orientation: Right;Location: Knee   Pain Onset/Description Frequency: Intermittent   Effect of Pain on Daily Activities limits pt's tolerance to functional mobility and physical activity   Patient's Stated Pain Goal No pain   Hospital Pain Intervention(s) Repositioned   Restrictions/Precautions   Weight Bearing Precautions Per Order Yes   RLE Weight Bearing Per Order WBAT   Other Precautions Chair Alarm; Bed Alarm;O2;Fall Risk;Pain;WBS  (5L midflow NC)   General   Chart Reviewed Yes   Additional Pertinent History POD 5: R femur IM nail   Response to Previous Treatment Patient with no complaints from previous session  Family/Caregiver Present Yes  (pt's wife)   Cognition   Overall Cognitive Status WFL   Arousal/Participation Alert; Cooperative   Attention Within functional limits   Orientation Level Oriented X4   Memory Within functional limits   Following Commands Follows one step commands without difficulty   Comments Pt ID via name and ; pt agreeable to PT intervention and OOB mobility   Bed Mobility   Supine to Sit Unable to assess   Additional items   (pt sitting at EOB upon arrival)   Sit to Supine Unable to assess   Additional items   (pt sitting OOB in recliner chair at end of session)   Additional Comments Pt able to maintain sitting balance at EOB w/ supervision   Transfers   Sit to Stand 2  Maximal assistance   Additional items Assist x 2; Increased time required;Verbal cues  (bed elevated)   Stand to Sit 2  Maximal assistance   Additional items Assist x 2;Armrests; Increased time required;Verbal cues   Toilet transfer 2  Maximal assistance   Additional items Assist x 2;Armrests; Increased time required;Verbal cues;Commode   Additional Comments Pt performed 3 STS transfer trials from EOB w/ Max Ax2 and VC for hand placement  Pt able to maintain upright standing positioning for 15 seconds during first trial and 30 seconds during 2nd and 3rd trial  VC for hand placement and to improve standing posture  Pt able to perform bilateral standing marching in place x3 each LE w/ Max Ax2 and BUE support on RW  Pt performed 2 STS transfer trials w/ Max AX2 and Gamaliel Rower for EOB>commode>chair postioning  Ambulation/Elevation   Gait pattern Not tested   Ambulation/Elevation Additional Comments Pt unable to advance either LE in standing to initiate ambulation at this time  Balance   Static Sitting Fair +   Dynamic Sitting Fair   Static Standing Poor +   Dynamic Standing   (Max Ax2 w/ RW)   Endurance Deficit   Endurance Deficit Yes   Endurance Deficit Description Pt required therapeutic rest breaks between mobility trials due to fatigue  Pt's SpO2 briefly dropped to 85% during mobility (questionable accuracy of reading due to pt gripping RW), quickly recovered to >90%  Breathing technique education provided throughout    Activity Tolerance   Activity Tolerance Patient limited by fatigue   Medical Staff Made Aware JAKE Hernández; care coordination due to pt's limited activity tolerance, required level of physical assistance, and to allow for challenge of pt's tolerance to physical activity and functional mobility   Nurse Made Aware RN Melanie   Assessment   Prognosis Fair   Problem List Decreased strength;Decreased endurance; Impaired balance;Decreased mobility; Decreased safety awareness; Obesity;Orthopedic restrictions;Pain   Assessment Pt seen today for PT intervention w/ pt agreeable to participate and motivated to perform OOB mobility  Pt sitting at EOB upon arrival and demonstrated to maintain static sitting balance at EOB w/ supervision for multiple trials   Pt continues to require Max Ax2 for STS transfer trials w/ VC for hand placement, transfer technique, and to improve upright standing position/posture  Compared to previous session, pt w/ increase standing tolerance and demonstrated ability to weight-shifting to clear BLE from floor x3 each LE w/ Max Ax2 and BUE on RW  Pt continues to be unable to initiate ambulation due to inability to anteriorly advance LE in a standing position  Additional STS transfer trials performed using [employer] for mobility from EOB>commode>chair to allow pt a change in body positioning  Pt remains mobility below baseline level and will continue to benefit from skilled PT intervention to increase pt's independence w/ functional mobility, decrease pt's burden of care, and to promote progress towards pt's PLOF  DC rec: post acute rehab   Goals   Patient Goals Pt wants to be able to go into the bathroom   STG Expiration Date 02/11/22   Short Term Goal #1 Patient will: Increase bilateral LE strength 1/2 grade to facilitate independent mobility, Perform all bed mobility tasks w/ mod A x 1 to decrease fall risk factors, Perform all transfers w/ mod A x 1 to improve independence (+/- use of QuickMove, or trialing slideboard), Increase all balance 1/2 grade to decrease risk for falls, Complete exercise program independently and Tolerate 3 hr OOB to faciliate upright tolerance, PT to see for gait when appropriate   PT Treatment Day 2   Plan   Treatment/Interventions Functional transfer training;LE strengthening/ROM; Therapeutic exercise; Endurance training;Patient/family training;Equipment eval/education; Bed mobility  (PT to see for ambulation when appropriate)   Progress Slow progress, decreased activity tolerance   PT Frequency 4-6x/wk   Recommendation   PT Discharge Recommendation Post acute rehabilitation services   Equipment Recommended [address] Recommended Wheeled walker  (wide RW)   Additional Comments Recommend [employer] for OOB mobility at this time; RN notified   Natividad Virgen Turning in Bed Without Bedrails 2   Lying on Back to Sitting on Edge of Flat Bed 2   Moving Bed to Chair 1   Standing Up From Chair 1   Walk in Room 1   Climb 3-5 Stairs 1   Basic Mobility Inpatient Raw Score 8   Turning Head Towards Sound 4   Follow Simple Instructions 4   Low Function Basic Mobility Raw Score 16   Low Function Basic Mobility Standardized Score 25 72   Highest Level Of Mobility   JH-HL Goal 3: Sit at edge of bed   JH-HLM Highest Level of Mobility 3: Sit at edge of bed   JH-HLM Goal Achieved Yes   Education   Education Provided Mobility training;Assistive device   Patient Demonstrates acceptance/verbal understanding;Reinforcement needed   End of Consult   Patient Position at End of Consult Bedside chair;Bed/Chair alarm activated; All needs within reach  (waffle cushion, LE elevated)       The patient's AM-PAC Basic Mobility Inpatient Short Form Raw Score is 8  A Raw score of less than or equal to 16 suggests the patient may benefit from discharge to post-acute rehabilitation services  Please also refer to the recommendation of the Physical Therapist for safe discharge planning      DC rec: post acute rehab      Areli Haque, PT, DPT  02/04/22

## 2022-02-04 NOTE — ASSESSMENT & PLAN NOTE
Wt Readings from Last 3 Encounters:   02/04/22 127 kg (280 lb 6 8 oz)   01/24/22 131 kg (289 lb)   01/06/22 130 kg (286 lb)     - Chronic history of diastolic CHF with evidence of acute pulmonary edema  - cardiology and Nephrology were consulted and note appreciated  - echocardiogram 1/31:  EF 65%  - SHAWNEE improved to baseline kidney function  Continue home PO diuresis  - Oxygen requirements improving, currently requiring 3 L O2 NC  - continue to wean oxygen as able to maintain oxygen saturation greater than 93%    - Home oral diuretics restarted

## 2022-02-04 NOTE — PROGRESS NOTES
Progress Note - Michelle Tracy 80 y o  male MRN: [id number]    Unit/Bed#: W -01 Encounter: [id number]      Assessment/Plan:  1  Ambulatory dysfunction s/p fall  2  Deconditioning/frailty  Optimize diet, hydration, mobility for healing  GFR 50 (improving), nephro/cards following - keep hydrated, avoid nephrotoxins   Alb 2 4, poor food choices, consider nutrition eval   Monitor ss infection, dehydration, dvt, skin breakdown  Encourage cough and deep breathing exercises, IS  Supplementa o2 continues- monitor hypoxia  Cont with lasix   3  Forgetfulness/Delirium risk  · Stable at present  Cont frequent reminders, close monitoring for safety  Cont delirium precautions as previously written  4   Acute pain/chronic neuropathy  · APS following, acute right hip pain s/p right IM nail 1/31, chronic neuropathy  · Tylenol, cymbalta, prn oxyIR cont - uses occ doses of oxyIR  · Gabapentin has been dc'd- no change in neuropathy  · Cont nonphram methods of pain control  5  Vision/hearing impairment  · Cont supportive environment with adequate lighting, reminders for glasses, quiet environment when communicating, speak clearly and toward patient  6   Constipation  · Chronic constipation, takes colace/miralax at home  · Day 7, no bm - given suppository today, cont senna-s, miralax  · Consider adding colace bid  Consider enema if no results from supp today  · Cont to encourage dietary fiber, fluids, mobility as able  Subjective:   Patient seen for geriatrics follow up  He is resting in bed  Pt is A&Ox3, mildly forgetful  Daughter at bedside  Pt c/o constipation - no bm yet, supp given, h/o constipation  He reports ongoing neuropathy of bilat LE that decrease function  Hip pain is controlled  He denies cp/sob/cough  Denies gi/gu distress  Daughter ordering good foods, but patient changing to sweets  Breathing improving, IV lasix likely to change to po tomorrow    Goal to discharge to rehab on Monday  Objective:     Vitals: Blood pressure (!) 109/46, pulse 66, temperature (!) 97 4 °F (36 3 °C), resp  rate 16, height 6' 1" (1 854 m), weight 127 kg (280 lb 6 8 oz), SpO2 92 %  ,Body mass index is 37 kg/m²  Intake/Output Summary (Last 24 hours) at 2/4/2022 1116  Last data filed at 2/4/2022 1025  Gross per 24 hour   Intake 1176 ml   Output 2225 ml   Net -1049 ml       Physical Exam:   General:  A&O x3, no distress, mild forgetful  Cards:  RRR, +murmur, no gallop/rub noted  Pulm:  Norm effort/no distress, cta, no w/r/r  Abd:  Soft, nt, nd, +bs  MS:  Norm ROM x decreased RLE d/t pain, no focal weakness  Ext:  W/d, no edema       Invasive Devices  Report    Peripheral Intravenous Line            Peripheral IV 02/03/22 Dorsal (posterior); Left Hand <1 day                Lab, Imaging and other studies: I have personally reviewed pertinent reports      VTE Pharmacologic Prophylaxis: xarelto

## 2022-02-04 NOTE — ASSESSMENT & PLAN NOTE
Lab Results   Component Value Date    EGFR 40 02/03/2022    EGFR 33 02/02/2022    EGFR 32 02/01/2022    CREATININE 1 59 (H) 02/03/2022    CREATININE 1 86 (H) 02/02/2022    CREATININE 1 87 (H) 02/01/2022     - Patient with chronic history of CKD stage 3 with baseline creatinine appearing to be between 1 4 and 1 5   - creatinine improved  - nephrology was consulted note appreciated    - avoid hypotension

## 2022-02-05 PROBLEM — K59.00 CONSTIPATION: Status: ACTIVE | Noted: 2022-02-05

## 2022-02-05 PROBLEM — D62 ACUTE BLOOD LOSS ANEMIA: Status: ACTIVE | Noted: 2022-02-05

## 2022-02-05 LAB
ANION GAP SERPL CALCULATED.3IONS-SCNC: 5 MMOL/L (ref 4–13)
BASOPHILS # BLD AUTO: 0.01 THOUSANDS/ΜL (ref 0–0.1)
BASOPHILS NFR BLD AUTO: 0 % (ref 0–1)
BUN SERPL-MCNC: 53 MG/DL (ref 5–25)
CALCIUM SERPL-MCNC: 9.2 MG/DL (ref 8.3–10.1)
CHLORIDE SERPL-SCNC: 100 MMOL/L (ref 100–108)
CO2 SERPL-SCNC: 31 MMOL/L (ref 21–32)
CREAT SERPL-MCNC: 1.37 MG/DL (ref 0.6–1.3)
EOSINOPHIL # BLD AUTO: 0.01 THOUSAND/ΜL (ref 0–0.61)
EOSINOPHIL NFR BLD AUTO: 0 % (ref 0–6)
ERYTHROCYTE [DISTWIDTH] IN BLOOD BY AUTOMATED COUNT: 17.8 % (ref 11.6–15.1)
GFR SERPL CREATININE-BSD FRML MDRD: 48 ML/MIN/1.73SQ M
GLUCOSE SERPL-MCNC: 120 MG/DL (ref 65–140)
HCT VFR BLD AUTO: 25.2 % (ref 36.5–49.3)
HGB BLD-MCNC: 7.5 G/DL (ref 12–17)
IMM GRANULOCYTES # BLD AUTO: 0.33 THOUSAND/UL (ref 0–0.2)
IMM GRANULOCYTES NFR BLD AUTO: 3 % (ref 0–2)
LYMPHOCYTES # BLD AUTO: 0.93 THOUSANDS/ΜL (ref 0.6–4.47)
LYMPHOCYTES NFR BLD AUTO: 9 % (ref 14–44)
MCH RBC QN AUTO: 25.3 PG (ref 26.8–34.3)
MCHC RBC AUTO-ENTMCNC: 29.8 G/DL (ref 31.4–37.4)
MCV RBC AUTO: 85 FL (ref 82–98)
MONOCYTES # BLD AUTO: 0.66 THOUSAND/ΜL (ref 0.17–1.22)
MONOCYTES NFR BLD AUTO: 6 % (ref 4–12)
NEUTROPHILS # BLD AUTO: 8.43 THOUSANDS/ΜL (ref 1.85–7.62)
NEUTS SEG NFR BLD AUTO: 82 % (ref 43–75)
NRBC BLD AUTO-RTO: 1 /100 WBCS
PLATELET # BLD AUTO: 221 THOUSANDS/UL (ref 149–390)
PMV BLD AUTO: 9.6 FL (ref 8.9–12.7)
POTASSIUM SERPL-SCNC: 3.7 MMOL/L (ref 3.5–5.3)
RBC # BLD AUTO: 2.96 MILLION/UL (ref 3.88–5.62)
SODIUM SERPL-SCNC: 136 MMOL/L (ref 136–145)
WBC # BLD AUTO: 10.37 THOUSAND/UL (ref 4.31–10.16)

## 2022-02-05 PROCEDURE — 80048 BASIC METABOLIC PNL TOTAL CA: CPT | Performed by: NURSE PRACTITIONER

## 2022-02-05 PROCEDURE — 99232 SBSQ HOSP IP/OBS MODERATE 35: CPT | Performed by: INTERNAL MEDICINE

## 2022-02-05 PROCEDURE — 85027 COMPLETE CBC AUTOMATED: CPT | Performed by: NURSE PRACTITIONER

## 2022-02-05 PROCEDURE — 94640 AIRWAY INHALATION TREATMENT: CPT

## 2022-02-05 PROCEDURE — 99232 SBSQ HOSP IP/OBS MODERATE 35: CPT | Performed by: SURGERY

## 2022-02-05 PROCEDURE — 94760 N-INVAS EAR/PLS OXIMETRY 1: CPT

## 2022-02-05 RX ADMIN — Medication 1000 UNITS: at 09:09

## 2022-02-05 RX ADMIN — ALLOPURINOL 200 MG: 100 TABLET ORAL at 09:08

## 2022-02-05 RX ADMIN — Medication 2 TABLET: at 22:11

## 2022-02-05 RX ADMIN — IRON SUCROSE 200 MG: 20 INJECTION, SOLUTION INTRAVENOUS at 09:10

## 2022-02-05 RX ADMIN — FLUTICASONE FUROATE AND VILANTEROL TRIFENATATE 1 PUFF: 200; 25 POWDER RESPIRATORY (INHALATION) at 09:10

## 2022-02-05 RX ADMIN — OXYCODONE HYDROCHLORIDE AND ACETAMINOPHEN 500 MG: 500 TABLET ORAL at 09:09

## 2022-02-05 RX ADMIN — FUROSEMIDE 20 MG: 20 TABLET ORAL at 17:17

## 2022-02-05 RX ADMIN — BISACODYL 10 MG: 10 SUPPOSITORY RECTAL at 09:08

## 2022-02-05 RX ADMIN — PRAVASTATIN SODIUM 40 MG: 40 TABLET ORAL at 17:17

## 2022-02-05 RX ADMIN — ACETAMINOPHEN 975 MG: 325 TABLET, FILM COATED ORAL at 22:11

## 2022-02-05 RX ADMIN — GUAIFENESIN 600 MG: 600 TABLET, EXTENDED RELEASE ORAL at 09:08

## 2022-02-05 RX ADMIN — Medication 500 MG: at 17:17

## 2022-02-05 RX ADMIN — Medication 500 MG: at 09:08

## 2022-02-05 RX ADMIN — SENNOSIDES AND DOCUSATE SODIUM 1 TABLET: 50; 8.6 TABLET ORAL at 09:09

## 2022-02-05 RX ADMIN — LEVALBUTEROL HYDROCHLORIDE 0.63 MG: 0.63 SOLUTION RESPIRATORY (INHALATION) at 21:19

## 2022-02-05 RX ADMIN — POLYETHYLENE GLYCOL 3350 17 G: 17 POWDER, FOR SOLUTION ORAL at 09:08

## 2022-02-05 RX ADMIN — LEVALBUTEROL HYDROCHLORIDE 1.25 MG: 1.25 SOLUTION, CONCENTRATE RESPIRATORY (INHALATION) at 07:20

## 2022-02-05 RX ADMIN — FINASTERIDE 5 MG: 5 TABLET, FILM COATED ORAL at 09:08

## 2022-02-05 RX ADMIN — GUAIFENESIN 600 MG: 600 TABLET, EXTENDED RELEASE ORAL at 22:11

## 2022-02-05 RX ADMIN — SENNOSIDES AND DOCUSATE SODIUM 1 TABLET: 50; 8.6 TABLET ORAL at 17:17

## 2022-02-05 RX ADMIN — RIVAROXABAN 15 MG: 15 TABLET, FILM COATED ORAL at 22:11

## 2022-02-05 RX ADMIN — DULOXETINE HYDROCHLORIDE 60 MG: 60 CAPSULE, DELAYED RELEASE ORAL at 09:08

## 2022-02-05 RX ADMIN — FUROSEMIDE 40 MG: 40 TABLET ORAL at 09:08

## 2022-02-05 RX ADMIN — ACETAMINOPHEN 975 MG: 325 TABLET, FILM COATED ORAL at 13:10

## 2022-02-05 RX ADMIN — ISODIUM CHLORIDE 3 ML: 0.03 SOLUTION RESPIRATORY (INHALATION) at 21:19

## 2022-02-05 RX ADMIN — ACETAMINOPHEN 975 MG: 325 TABLET, FILM COATED ORAL at 05:06

## 2022-02-05 RX ADMIN — PREDNISONE 60 MG: 20 TABLET ORAL at 09:08

## 2022-02-05 RX ADMIN — ISODIUM CHLORIDE 3 ML: 0.03 SOLUTION RESPIRATORY (INHALATION) at 07:20

## 2022-02-05 RX ADMIN — ASPIRIN 81 MG CHEWABLE TABLET 81 MG: 81 TABLET CHEWABLE at 09:09

## 2022-02-05 RX ADMIN — Medication 100 MCG: at 09:08

## 2022-02-05 NOTE — ASSESSMENT & PLAN NOTE
- acute blood loss anemia post-op right femur IM nail in the setting of CKD  - Hgb stable at 7 5, hemodynamically stable  - Pt has not required transfusion  - Appreciate nephrology consult, transfusing IV venofer for 3 doses

## 2022-02-05 NOTE — PROGRESS NOTES
General Cardiology   Progress Note -  [facility]   Magdiel Palma 80 y o  male MRN: [id number]  Unit/Bed#: W -01 Encounter: [id number]    Assessment/ Plan    1  Acute right intertrochanteric hip fracture s/p IM nailing 1/31  POD #5  Per orthopedic surgery    2  Severe AS  FLY 0 91 cm 2, MG 41 mmHg  Normal LVEF  Outpatient follow up with Dr Leisa Ashley for possible TAVR referral    3  Chronic diastolic CHF with iatrogenic volume overload  Patient improved with IV Lasix- now on room air and without c/o dyspnea  Euvolemic on exam today  Resume home diuretic- Lasix 40 mg am and 20 mg pm    I/O: -2 1L/24 hours  Net negative 5 7 L  Weights inaccurate due to use of bed scale  Needs close outpatient follow up with Dr Leisa Ashley for monitoring of his volume status in the setting of his severe AS  4  S/p dual chamber [employer]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  Battery life 6 months  Follow up with Dr Leisa Ashley    5  Chronic atrial fibrillation  Xarelto for [address]    6  CAD s/p PCI/LEOBARDO to prox RCA and mid LAD in 5/2019  No anginal symptoms  Continue statin and ASA  7  HTN- controlled on current regimen- average /54    8  SHAWNEE/CKD III- Creatinine 1 37  This had improved with diuresis and remains stable today at his baseline    9  Anemia- hemoglobin stable over past several days, 7 5 this morning  Subjective  C/o neuropathic pain especially in right leg  No cardiac complaints  Reports good urine output  Review of Systems   Constitutional: Positive for malaise/fatigue  Negative for chills  Cardiovascular: Negative for chest pain, dyspnea on exertion, leg swelling, orthopnea, palpitations and syncope  Respiratory: Negative for cough, shortness of breath, sleep disturbances due to breathing and sputum production  Musculoskeletal: Negative for joint pain  Gastrointestinal: Positive for constipation  Negative for bloating and nausea  Neurological: Positive for paresthesias (neuropathy in b/l LE)   Negative for dizziness, light-headedness and weakness  Psychiatric/Behavioral: Negative for altered mental status  All other systems reviewed and are negative  Objective:   Vitals: Blood pressure 111/55, pulse 70, temperature 97 7 °F (36 5 °C), resp  rate 16, height 6' 1" (1 854 m), weight 132 kg (291 lb 0 1 oz), SpO2 91 % , Body mass index is 38 39 kg/m²  ,     Systolic (92SCV), HSW:278 , Min:103 , JGT:217     Diastolic (30UDB), BVH:74, Min:49, Max:57      Intake/Output Summary (Last 24 hours) at 2/5/2022 0831  Last data filed at 2/5/2022 0817  Gross per 24 hour   Intake 820 ml   Output 2925 ml   Net -2105 ml     Wt Readings from Last 3 Encounters:   02/05/22 132 kg (291 lb 0 1 oz)   01/24/22 131 kg (289 lb)   01/06/22 130 kg (286 lb)     Telemetry Review: N/a    Physical Exam  Vitals reviewed  Constitutional:       General: He is not in acute distress  Appearance: He is obese  Neck:      Vascular: No hepatojugular reflux or JVD  Cardiovascular:      Rate and Rhythm: Normal rate and regular rhythm  Heart sounds: Murmur heard  Systolic murmur is present  No friction rub  No gallop  Pulmonary:      Effort: Pulmonary effort is normal  No respiratory distress  Breath sounds: No rales  Comments: Decreased throughout, no significant rales  On room air, 91-93%  Abdominal:      General: Bowel sounds are normal  There is no distension  Palpations: Abdomen is soft  Tenderness: There is no abdominal tenderness  Musculoskeletal:         General: Tenderness (RLE) present  Cervical back: Neck supple  Right lower leg: No edema  Left lower leg: No edema  Skin:     General: Skin is warm and dry  Capillary Refill: Capillary refill takes 2 to 3 seconds  Findings: No erythema  Neurological:      Mental Status: He is alert and oriented to person, place, and time     Psychiatric:         Mood and Affect: Mood normal      LABORATORY RESULTS      CBC with diff:   Results from last 7 days   Lab Units 02/05/22  0457 02/04/22  0436 02/03/22  0535 02/02/22  0602 02/01/22  0456 01/31/22 2247 01/31/22  0510 01/30/22  0537 01/30/22  0537 01/29/22  1016 01/29/22  1016   WBC Thousand/uL 10 37* 9 89 8 18 10 63* 9 65  --  11 24*  --  13 15*   < > 9 20   HEMOGLOBIN g/dL 7 5* 7 5* 7 4* 7 5* 7 6*  --  8 1*  --  8 9*   < > 9 9*   HEMATOCRIT % 25 2* 25 7* 24 8* 24 9* 25 9*  --  27 1*  --  30 6*   < > 33 7*   MCV fL 85 85 85 84 85  --  83  --  84   < > 84   PLATELETS Thousands/uL 221 230 208 212 190 204 226   < > 237   < > 238   MCH pg 25 3* 24 8* 25 3* 25 3* 24 8*  --  24 7*  --  24 5*   < > 24 8*   MCHC g/dL 29 8* 29 2* 29 8* 30 1* 29 3*  --  29 9*  --  29 1*   < > 29 4*   RDW % 17 8* 17 6* 17 1* 16 4* 16 6*  --  17 0*  --  16 7*   < > 16 7*   MPV fL 9 6 10 0 9 4 9 4 9 7 9 1 9 5   < > 9 2   < > 8 8*   NRBC AUTO /100 WBCs 1 1  --  1 0  --  0  --   --   --  0    < > = values in this interval not displayed       CMP:  Results from last 7 days   Lab Units 02/05/22  0457 02/04/22  0436 02/03/22  0535 02/02/22  0602 02/01/22  0456 01/31/22 2241 01/31/22  0510   POTASSIUM mmol/L 3 7 4 0 3 7 3 7 4 2 4 3 3 9   CHLORIDE mmol/L 100 102 101 96* 98* 97* 101   CO2 mmol/L 31 28 28 27 26 28 26   BUN mg/dL 53* 47* 51* 59* 52* 50* 49*   CREATININE mg/dL 1 37* 1 31* 1 59* 1 86* 1 87* 2 04* 2 16*   CALCIUM mg/dL 9 2 9 6 8 9 8 8 8 7 8 8 8 8   AST U/L  --   --   --   --  28  --   --    ALT U/L  --   --   --   --  15  --   --    ALK PHOS U/L  --   --   --   --  53  --   --    EGFR ml/min/1 73sq m 48 50 40 33 32 29 27     BMP:  Results from last 7 days   Lab Units 02/05/22  0457 02/04/22  0436 02/03/22  0535 02/02/22  0602 02/01/22  0456 01/31/22  2241 01/31/22  0510   POTASSIUM mmol/L 3 7 4 0 3 7 3 7 4 2 4 3 3 9   CHLORIDE mmol/L 100 102 101 96* 98* 97* 101   CO2 mmol/L 31 28 28 27 26 28 26   BUN mg/dL 53* 47* 51* 59* 52* 50* 49*   CREATININE mg/dL 1 37* 1 31* 1 59* 1 86* 1 87* 2 04* 2 16*   CALCIUM mg/dL 9 2 9 6 8 9 8 8 8 7 8 8 8 8     Lab Results   Component Value Date    CREATININE 1 37 (H) 2022    CREATININE 1 31 (H) 2022    CREATININE 1 59 (H) 2022     Lab Results   Component Value Date    NTBNP 2,424 (H) 2021    NTBNP 1,892 (H) 2020    NTBNP 1,032 (H) 2020      Results from last 7 days   Lab Units 22  0535 22  0510   MAGNESIUM mg/dL 2 3 2 3     Lipid Profile:   Lab Results   Component Value Date    CHOL 137 2017     Lab Results   Component Value Date    HDL 47 2021    HDL 38 (L) 2019    HDL 52 04/10/2019     Lab Results   Component Value Date    LDLCALC 58 2021    LDLCALC 62 2019    LDLCALC 91 04/10/2019     Lab Results   Component Value Date    TRIG 44 2021    TRIG 117 2019    TRIG 70 04/10/2019     Cardiac testing:   Results for orders placed during the hospital encounter of 20    Echo complete with contrast if indicated    Narrative  [address]  [address] [facility]  [phone]    Transthoracic Echocardiogram  2D, M-mode, Doppler, and Color Doppler    Study date:  14-Sep-2020    Patient: Kathryn Vazquez  MR number: [id number]  Account number: [de-identified]  : 1940  Age: [de-identified] years  Gender: Male  Status: Inpatient  Location: Bedside  Height: 73 in  Weight: 298 3 lb  BP: 120/ 68 mmHg    Diagnoses: I48 0 - Atrial fibrillation    Sonographer:  ROGER Ambriz  Primary Physician:  Susanna Cai DO  Referring Physician:  Brad Fisher MD  Group:  [facility]  Interpreting Physician:  Brad Fisher MD    SUMMARY    LEFT VENTRICLE:  Systolic function was normal  Ejection fraction was estimated in the range of 55 % to 60 % to be 55 %  Although no diagnostic regional wall motion abnormality was identified, this possibility cannot be completely excluded on the basis of this study  Wall thickness was mildly to moderately increased    There was mild concentric hypertrophy  RIGHT VENTRICLE:  The ventricle was mildly dilated  LEFT ATRIUM:  The atrium was moderately to markedly dilated  RIGHT ATRIUM:  The atrium was mildly to moderately dilated  MITRAL VALVE:  There was moderate annular calcification  There was mild stenosis  Mean Gradient 6-7 mm of hg  There was mild regurgitation  AORTIC VALVE:  The valve was functionally bicuspid  Leaflets exhibited normal thickness and normal cuspal separation  Transaortic velocity was increased due to valvular stenosis  There was moderate to severe stenosis  Shima 1 0 cm2, peak gradient 65, mean 35 mm of hg    TRICUSPID VALVE:  There was mild to moderate regurgitation  Estimated peak PA pressure was 55 to 60 mmHg  The findings suggest moderate pulmonary hypertension  PULMONIC VALVE:  There was trace regurgitation  IVC, HEPATIC VEINS:  The inferior vena cava was mildly dilated  The respirophasic change in diameter was more than 50%  COMPARISONS:  Comparison was made with the previous study of 08-Apr-2019  Aortic stenosis has worsened  Pulmonary artery pressure has increased  HISTORY: PRIOR HISTORY: A  Flutter,A Fib ,chronic kidney disease,gout,heart failure,HTN,Pacemaker,pulmonary emphysema,sleep apnea,Angioplasty with stent placement,CAD,COPD,AAA  PROCEDURE: The procedure was performed at the bedside  This was a routine study  The transthoracic approach was used  The study included complete 2D imaging, M-mode, complete spectral Doppler, and color Doppler  The heart rate was 76 bpm,  at the start of the study  Images were obtained from the parasternal, apical, subcostal, and suprasternal notch acoustic windows  Intravenous contrast ( 0 4ml Definity in NSS) was administered to opacify the left ventricle and enhance  Doppler signals  Echocardiographic views were limited due to poor acoustic window availability, decreased penetration, and lung interference  This was a technically difficult study      LEFT VENTRICLE: Size was normal  Systolic function was normal  Ejection fraction was estimated in the range of 55 % to 60 % to be 55 %  Although no diagnostic regional wall motion abnormality was identified, this possibility cannot be  completely excluded on the basis of this study  Wall thickness was mildly to moderately increased  There was mild concentric hypertrophy  DOPPLER: The study was not technically sufficient to allow evaluation of LV diastolic function  RIGHT VENTRICLE: The ventricle was mildly dilated  Systolic function was normal     LEFT ATRIUM: The atrium was moderately to markedly dilated  RIGHT ATRIUM: The atrium was mildly to moderately dilated  A pacing wire was present  MITRAL VALVE: There was moderate annular calcification  There was mildly reduced leaflet separation  DOPPLER: Transmitral velocity was minimally increased  There was mild stenosis  Mean Gradient 6-7 mm of hg There was mild regurgitation  AORTIC VALVE: The valve was functionally bicuspid  Leaflets exhibited normal thickness and normal cuspal separation  DOPPLER: Transaortic velocity was increased due to valvular stenosis  There was moderate to severe stenosis  Shima 1 0 cm2,  peak gradient 65, mean 35 mm of hg There was no regurgitation  TRICUSPID VALVE: DOPPLER: There was mild to moderate regurgitation  Estimated peak PA pressure was 55 to 60 mmHg  The findings suggest moderate pulmonary hypertension  PULMONIC VALVE: DOPPLER: There was trace regurgitation  PERICARDIUM: There was no thickening or calcification  There was no pericardial effusion  AORTA: The root exhibited normal size  SYSTEMIC VEINS: IVC: The inferior vena cava was mildly dilated  The respirophasic change in diameter was more than 50%      SYSTEM MEASUREMENT TABLES    2D mode  AoR Diam 2D: 3 1 cm  LA Diam (2D): 5 3 cm  LA/Ao (2D): 1 71  FS (2D Teich): 25 7 %  IVSd (2D): 1 3 cm  LVDEV: 130 cmï¾³  LVESV: 64 7 cmï¾³  LVIDd(2D): 5 21 cm  LVISd (2D): 3 87 cm  LVOT Area 2D: 3 46 cmï¾²  LVPWd (2D): 1 33 cm  SV (Teich): 65 3 cmï¾³    Apical four chamber  LVEF A4C: 54 %    Unspecified Scan Mode  FLY Cont Eq (Peak Vitaly): 1 03 cmï¾²  LVOT Diam : 2 1 cm  LVOT Vmax: 1160 mm/s  LVOT Vmax; Mean: 1160 mm/s  Peak Grad ; Mean: 5 mm[Hg]  MV Peak A Vitaly: 434 mm/s  MV Peak E Vitaly   Mean: 1680 mm/s  MVA (PHT): 3 01 cmï¾²  PHT: 73 ms  Max P mm[Hg]  V Max: 2920 mm/s  Vmax: 3410 mm/s  TAPSE: 1 5 cm    [facility]    Prepared and electronically signed by    Christine Telles MD  Signed 14-Sep-2020 13:14:06    Meds/Allergies   all current active meds have been reviewed and current meds:   Current Facility-Administered Medications   Medication Dose Route Frequency    acetaminophen (TYLENOL) tablet 975 mg  975 mg Oral Q8H [address]    albuterol (PROVENTIL HFA,VENTOLIN HFA) inhaler 2 puff  2 puff Inhalation Q6H PRN    allopurinol (ZYLOPRIM) tablet 200 mg  200 mg Oral Daily    ascorbic acid (VITAMIN C) tablet 500 mg  500 mg Oral Daily    aspirin chewable tablet 81 mg  81 mg Oral Daily    bisacodyl (DULCOLAX) rectal suppository 10 mg  10 mg Rectal Daily    calcium carbonate-vitamin D (OSCAL-D) 500 mg-200 units per tablet 2 tablet  2 tablet Oral HS    cholecalciferol (VITAMIN D3) tablet 1,000 Units  1,000 Units Oral Daily    cyanocobalamin (VITAMIN B-12) tablet 100 mcg  100 mcg Oral Daily    DULoxetine (CYMBALTA) delayed release capsule 60 mg  60 mg Oral Daily    finasteride (PROSCAR) tablet 5 mg  5 mg Oral Daily    fluticasone-vilanterol (BREO ELLIPTA) 200-25 MCG/INH inhaler 1 puff  1 puff Inhalation Daily    furosemide (LASIX) tablet 20 mg  20 mg Oral QPM    furosemide (LASIX) tablet 40 mg  40 mg Oral Daily    glucosamine sulfate capsule 500 mg  500 mg Oral BID    guaiFENesin (MUCINEX) 12 hr tablet 600 mg  600 mg Oral Q12H JOSE    iron sucrose (VENOFER) 200 mg in sodium chloride 0 9 % 100 mL IVPB  200 mg Intravenous Daily    levalbuterol (XOPENEX) inhalation solution 0 63 mg  0 63 mg Nebulization Q8H PRN    levalbuterol (XOPENEX) inhalation solution 1 25 mg  1 25 mg Nebulization BID    naloxone (NARCAN) 0 04 mg/mL syringe 0 04 mg  0 04 mg Intravenous Q1MIN PRN    oxyCODONE (ROXICODONE) IR tablet 2 5 mg  2 5 mg Oral Q4H PRN    oxyCODONE (ROXICODONE) IR tablet 5 mg  5 mg Oral Q4H PRN    polyethylene glycol (MIRALAX) packet 17 g  17 g Oral Daily    pravastatin (PRAVACHOL) tablet 40 mg  40 mg Oral Daily With Dinner    predniSONE tablet 60 mg  60 mg Oral Daily    rivaroxaban (XARELTO) tablet 15 mg  15 mg Oral HS    senna-docusate sodium (SENOKOT S) 8 6-50 mg per tablet 1 tablet  1 tablet Oral BID    sodium chloride 0 9 % inhalation solution 3 mL  3 mL Nebulization BID     Medications Prior to Admission   Medication    albuterol (Ventolin HFA) 90 mcg/act inhaler    allopurinol (ZYLOPRIM) 100 mg tablet    ascorbic acid (VITAMIN C) 500 mg tablet    B Complex Vitamins (B COMPLEX 1 PO)    Biotin (BIOTIN 5000) 5 MG CAPS    calcium carbonate-vitamin D (OSCAL-D) 500 mg-200 units per tablet    cholecalciferol (VITAMIN D3) 1,000 units tablet    clopidogrel (PLAVIX) 75 mg tablet    colchicine (COLCRYS) 0 6 mg tablet    Cranberry 1000 MG CAPS    cyanocobalamin 1000 MCG tablet    dextromethorphan-guaiFENesin (ROBITUSSIN DM)  mg/5 mL syrup    docusate sodium (COLACE) 100 mg capsule    DULoxetine (CYMBALTA) 60 mg delayed release capsule    felodipine (PLENDIL) 5 mg 24 hr tablet    finasteride (PROSCAR) 5 mg tablet    Flaxseed, Linseed, (EQL FLAX SEED OIL) 1000 MG CAPS    fluocinonide (LIDEX) 0 05 % cream    furosemide (LASIX) 40 mg tablet    gabapentin (Neurontin) 300 mg capsule    glucosamine-chondroitin 500-400 MG tablet    rivaroxaban (XARELTO) 15 mg tablet    simvastatin (ZOCOR) 20 mg tablet    spironolactone (ALDACTONE) 25 mg tablet    Farxiga 5 MG TABS    fluticasone-umeclidinium-vilanterol (Trelegy Ellipta) 611-71 2-38 MCG/INH AEPB inhaler     Counseling / Coordination of Care  Total floor / unit time spent today 20 minutes  Greater than 50% of total time was spent with the patient and / or family counseling and / or coordination of care  ** Please Note: Dragon 360 Dictation voice to text software may have been used in the creation of this document   **

## 2022-02-05 NOTE — PLAN OF CARE
Problem: Potential for Falls  Goal: Patient will remain free of falls  Description: INTERVENTIONS:  - Educate patient/family on patient safety including physical limitations  - Instruct patient to call for assistance with activity   - Consult OT/PT to assist with strengthening/mobility   - Keep Call bell within reach  - Keep bed low and locked with side rails adjusted as appropriate  - Keep care items and personal belongings within reach  - Initiate and maintain comfort rounds  - Make Fall Risk Sign visible to staff  - Offer Toileting every 2 Hours, in advance of need  - Initiate/Maintain alarm  - Obtain necessary fall risk management equipment  - Apply yellow socks and bracelet for high fall risk patients  - Consider moving patient to room near nurses station  Outcome: Progressing     Problem: Prexisting or High Potential for Compromised Skin Integrity  Goal: Skin integrity is maintained or improved  Description: INTERVENTIONS:  - Identify patients at risk for skin breakdown  - Assess and monitor skin integrity  - Assess and monitor nutrition and hydration status  - Monitor labs   - Assess for incontinence   - Turn and reposition patient  - Assist with mobility/ambulation  - Relieve pressure over bony prominences  - Avoid friction and shearing  - Provide appropriate hygiene as needed including keeping skin clean and dry  - Evaluate need for skin moisturizer/barrier cream  - Collaborate with interdisciplinary team   - Patient/family teaching  - Consider wound care consult   Outcome: Progressing     Problem: MOBILITY - ADULT  Goal: Maintain or return to baseline ADL function  Description: INTERVENTIONS:  -  Assess patient's ability to carry out ADLs; assess patient's baseline for ADL function and identify physical deficits which impact ability to perform ADLs (bathing, care of mouth/teeth, toileting, grooming, dressing, etc )  - Assess/evaluate cause of self-care deficits   - Assess range of motion  - Assess patient's mobility; develop plan if impaired  - Assess patient's need for assistive devices and provide as appropriate  - Encourage maximum independence but intervene and supervise when necessary  - Involve family in performance of ADLs  - Assess for home care needs following discharge   - Consider OT consult to assist with ADL evaluation and planning for discharge  - Provide patient education as appropriate  Outcome: Progressing  Goal: Maintains/Returns to pre admission functional level  Description: INTERVENTIONS:  - Perform BMAT or MOVE assessment daily    - Set and communicate daily mobility goal to care team and patient/family/caregiver  - Collaborate with rehabilitation services on mobility goals if consulted  - Perform Range of Motion 3 times a day  - Reposition patient every 2 hours  - Dangle patient 3 times a day  - Stand patient 3 times a day  - Ambulate patient 3 times a day  - Out of bed to chair 3 times a day   - Out of bed for meals 3 times a day  - Out of bed for toileting  - Record patient progress and toleration of activity level   Outcome: Progressing     Problem: Nutrition/Hydration-ADULT  Goal: Nutrient/Hydration intake appropriate for improving, restoring or maintaining nutritional needs  Description: Monitor and assess patient's nutrition/hydration status for malnutrition  Collaborate with interdisciplinary team and initiate plan and interventions as ordered  Monitor patient's weight and dietary intake as ordered or per policy  Utilize nutrition screening tool and intervene as necessary  Determine patient's food preferences and provide high-protein, high-caloric foods as appropriate       INTERVENTIONS:  - Monitor oral intake, urinary output, labs, and treatment plans  - Assess nutrition and hydration status and recommend course of action  - Evaluate amount of meals eaten  - Assist patient with eating if necessary   - Allow adequate time for meals  - Recommend/ encourage appropriate diets, oral nutritional supplements, and vitamin/mineral supplements  - Order, calculate, and assess calorie counts as needed  - Recommend, monitor, and adjust tube feedings and TPN/PPN based on assessed needs  - Assess need for intravenous fluids  - Provide specific nutrition/hydration education as appropriate  - Include patient/family/caregiver in decisions related to nutrition  Outcome: Progressing     Problem: PAIN - ADULT  Goal: Verbalizes/displays adequate comfort level or baseline comfort level  Description: Interventions:  - Encourage patient to monitor pain and request assistance  - Assess pain using appropriate pain scale  - Administer analgesics based on type and severity of pain and evaluate response  - Implement non-pharmacological measures as appropriate and evaluate response  - Consider cultural and social influences on pain and pain management  - Notify physician/advanced practitioner if interventions unsuccessful or patient reports new pain  Outcome: Progressing     Problem: INFECTION - ADULT  Goal: Absence or prevention of progression during hospitalization  Description: INTERVENTIONS:  - Assess and monitor for signs and symptoms of infection  - Monitor lab/diagnostic results  - Monitor all insertion sites, i e  indwelling lines, tubes, and drains  - Monitor endotracheal if appropriate and nasal secretions for changes in amount and color  - [place] appropriate cooling/warming therapies per order  - Administer medications as ordered  - Instruct and encourage patient and family to use good hand hygiene technique  - Identify and instruct in appropriate isolation precautions for identified infection/condition  Outcome: Progressing     Problem: SAFETY ADULT  Goal: Patient will remain free of falls  Description: INTERVENTIONS:  - Educate patient/family on patient safety including physical limitations  - Instruct patient to call for assistance with activity   - Consult OT/PT to assist with strengthening/mobility   - Keep Call bell within reach  - Keep bed low and locked with side rails adjusted as appropriate  - Keep care items and personal belongings within reach  - Initiate and maintain comfort rounds  - Make Fall Risk Sign visible to staff  - Offer Toileting every 2 Hours, in advance of need  - Initiate/Maintain alarm  - Obtain necessary fall risk management equipment  - Apply yellow socks and bracelet for high fall risk patients  - Consider moving patient to room near nurses station  Outcome: Progressing  Goal: Maintain or return to baseline ADL function  Description: INTERVENTIONS:  -  Assess patient's ability to carry out ADLs; assess patient's baseline for ADL function and identify physical deficits which impact ability to perform ADLs (bathing, care of mouth/teeth, toileting, grooming, dressing, etc )  - Assess/evaluate cause of self-care deficits   - Assess range of motion  - Assess patient's mobility; develop plan if impaired  - Assess patient's need for assistive devices and provide as appropriate  - Encourage maximum independence but intervene and supervise when necessary  - Involve family in performance of ADLs  - Assess for home care needs following discharge   - Consider OT consult to assist with ADL evaluation and planning for discharge  - Provide patient education as appropriate  Outcome: Progressing  Goal: Maintains/Returns to pre admission functional level  Description: INTERVENTIONS:  - Perform BMAT or MOVE assessment daily    - Set and communicate daily mobility goal to care team and patient/family/caregiver  - Collaborate with rehabilitation services on mobility goals if consulted  - Perform Range of Motion 3 times a day  - Reposition patient every 2 hours    - Dangle patient 3 times a day  - Stand patient 3 times a day  - Ambulate patient 3 times a day  - Out of bed to chair 3 times a day   - Out of bed for meals 3 times a day  - Out of bed for toileting  - Record patient progress and toleration of activity level   Outcome: Progressing     Problem: DISCHARGE PLANNING  Goal: Discharge to home or other facility with appropriate resources  Description: INTERVENTIONS:  - Identify barriers to discharge w/patient and caregiver  - Arrange for needed discharge resources and transportation as appropriate  - Identify discharge learning needs (meds, wound care, etc )  - Arrange for interpretive services to assist at discharge as needed  - Refer to Case Management Department for coordinating discharge planning if the patient needs post-hospital services based on physician/advanced practitioner order or complex needs related to functional status, cognitive ability, or social support system  Outcome: Progressing     Problem: Knowledge Deficit  Goal: Patient/family/caregiver demonstrates understanding of disease process, treatment plan, medications, and discharge instructions  Description: Complete learning assessment and assess knowledge base    Interventions:  - Provide teaching at level of understanding  - Provide teaching via preferred learning methods  Outcome: Progressing

## 2022-02-05 NOTE — PROGRESS NOTES
[facility]  Progress Note Farhan Pardo 1940, 80 y o  male MRN: [id number]  Unit/Bed#: W -01 Encounter: [id number]  Primary Care Provider: Lulu Pierre DO   Date and time admitted to hospital: 1/29/2022  9:34 AM    Constipation  Assessment & Plan  - Pt reports he hasnt had a BM in 8 days  - Multimodal bowel regimen since admission without relief  - Passing gas  - Enema today    Acute blood loss anemia  Assessment & Plan  - acute blood loss anemia post-op right femur IM nail in the setting of CKD  - Hgb stable at 7 5, hemodynamically stable  - Pt has not required transfusion  - Appreciate nephrology consult, transfusing IV venofer for 3 doses    Fall  Assessment & Plan  - Status post mechanical fall on ice with the below noted injuries and issues  - Fall precautions  - Geriatric Medicine consultation for evaluation, medication review and recommendations   - PT and OT evaluation and treatment as indicated  - Case Management consultation for disposition planning  CAD (coronary artery disease)  Assessment & Plan  - Patient with chronic significant history of CAD  Patient follows with Dr Shirin Crews  - Appreciate [facility] evaluation and recommendations including discontinuing Plavix and continuing aspirin on discharge  - No evidence of acute cardiac event at this time  - EKG indicates ventricular pacing rhythm  Pacemaker was interrogated, showing 6 months of battery life left  - Continue home medication therapy as appropriate      - Follow up with Dr Shirin Crews outpatient    Stage 3a chronic kidney disease [facility])  Assessment & Plan  Lab Results   Component Value Date    EGFR 50 02/04/2022    EGFR 40 02/03/2022    EGFR 33 02/02/2022    CREATININE 1 31 (H) 02/04/2022    CREATININE 1 59 (H) 02/03/2022    CREATININE 1 86 (H) 02/02/2022     - Patient with chronic history of CKD stage 3 with baseline creatinine appearing to be between 1 4 and 1 5   - creatinine improved, returning to baseline  - appreciate Nephrology consult and recommendations  - avoid hypotension    Lumbar radiculopathy  Assessment & Plan  - Patient with chronic lumbar radiculopathy and pain in his legs  Patient reports acutely worsened pain in his calves and feet since his fall   - Neurovascular exam intact on initial evaluation   - T and L-spine x-rays indicate no fractures  - patient has been complaining of worsening lower extremity pain for the past 3 days  - Lower extremity duplex negative  - Continue multimodal analgesic regimen including gabapentin  APS consulted  - Outpatient follow-up with PCP and pain provider  Chronic a-fib ([facility])  Assessment & Plan  - Chronic history of atrial fibrillation  - continue home Xarelto  - Monitor for adequate heart rate control   - Continue home medication regimen as appropriate otherwise  - Appreciate Cardiology evaluation and recommendations   - Outpatient follow-up per team     Chronic diastolic CHF (congestive heart failure) ([facility])  Assessment & Plan  Wt Readings from Last 3 Encounters:   02/04/22 127 kg (280 lb 6 8 oz)   01/24/22 131 kg (289 lb)   01/06/22 130 kg (286 lb)     - Chronic history of diastolic CHF with evidence of acute pulmonary edema  - cardiology and Nephrology were consulted and note appreciated  - echocardiogram 1/31:  EF 65%  - SHAWNEE improved to baseline kidney function  Continue home PO diuresis  - Oxygen requirements improving, currently requiring 3 L O2 NC  - continue to wean oxygen as able to maintain oxygen saturation greater than 93%    - Home oral diuretics restarted      Benign hypertension with chronic kidney disease, stage III [facility])  Assessment & Plan  Lab Results   Component Value Date    EGFR 50 02/04/2022    EGFR 40 02/03/2022    EGFR 33 02/02/2022    CREATININE 1 31 (H) 02/04/2022    CREATININE 1 59 (H) 02/03/2022    CREATININE 1 86 (H) 02/02/2022     - Patient with history of chronic hypertension   - Monitor blood pressures with goal systolic pressure less than 160  - Appreciate Cardiology and Nephrology evaluation and recommendations   - Outpatient follow-up with PCP per routine  Pain in both lower extremities  Assessment & Plan  · Patient complaining of calf pain  · History of lumbar radiculopathy and chronic lower extremity pain and notes that his right lower extremity pain is worse than usual   · Neurovascularly intact  · Negative Homans sign  · Bilateral lower extremity duplex negative for acute or chronic thrombus  · Chronic    Chronic pain syndrome  Assessment & Plan  - Patient with chronic pain syndrome   - Continue multimodal analgesic regimen with additional agents added/adjustments made in setting of acute pain secondary to traumatic injury  - Bowel regimen while on opioid therapy  - APS consult to assist with acute on chronic pain  - Outpatient follow-up with PCP and pain provider  * Closed fracture of right femur [facility])  Assessment & Plan  - Acute right intertrochanteric femur fracture, present on admission   - Appreciate Orthopedic surgery evaluation and recommendations   - 1/31 right femur IM nail  - Maintain WBAT RLE  - Monitor right lower extremity neurovascular exam   - Continue multimodal analgesic regimen-pain controlled  - DVT prophylaxis--Xarelto  - PT and OT evaluation and treatment as indicated  - follow-up with orthopedics as an outpatient  Acute kidney injury superimposed on chronic kidney disease (HCC)-resolved as of 2/4/2022  Assessment & Plan  Lab Results   Component Value Date    EGFR 50 02/04/2022    EGFR 40 02/03/2022    EGFR 33 02/02/2022    CREATININE 1 31 (H) 02/04/2022    CREATININE 1 59 (H) 02/03/2022    CREATININE 1 86 (H) 02/02/2022     - Patient with chronic history of CKD stage 3 with baseline creatinine appearing to be between 1 4 and 1 5   - SHAWNEE resolved  - continue home Lasix  - nephrology and cardiology consulted and notes appreciated          Disposition: Patient's medical status is improving  Continue to wean O2 and patient will likely be ready for rehab within 24-48 hours  SUBJECTIVE:  Chief Complaint: "I havent had a bowel movement"    Subjective: Patient is concerned that he hasn't moved his bowels since his fall  He reports he is passing gas and tolerating a diet, but he wants to have a BM and take a shower today  This was communicated with nursing staff  Patient reports his right hip pain is under control and his breathing feels improved        OBJECTIVE:     Meds/Allergies     Current Facility-Administered Medications:     acetaminophen (TYLENOL) tablet 975 mg, 975 mg, Oral, Q8H [address], Anton Means MD, 975 mg at 02/05/22 0506    albuterol (PROVENTIL HFA,VENTOLIN HFA) inhaler 2 puff, 2 puff, Inhalation, Q6H PRN, Anton Means MD, 2 puff at 01/30/22 1136    allopurinol (ZYLOPRIM) tablet 200 mg, 200 mg, Oral, Daily, Anton Means MD, 200 mg at 02/04/22 7376    ascorbic acid (VITAMIN C) tablet 500 mg, 500 mg, Oral, Daily, Anton Means MD, 500 mg at 02/04/22 2643    aspirin chewable tablet 81 mg, 81 mg, Oral, Daily, NAVA Blanchard, 81 mg at 02/04/22 9050    bisacodyl (DULCOLAX) rectal suppository 10 mg, 10 mg, Rectal, Daily, NAVA Perez, 10 mg at 02/04/22 7812    calcium carbonate-vitamin D (OSCAL-D) 500 mg-200 units per tablet 2 tablet, 2 tablet, Oral, HS, Anton Means MD, 2 tablet at 02/04/22 2129    cholecalciferol (VITAMIN D3) tablet 1,000 Units, 1,000 Units, Oral, Daily, Anton Means MD, 1,000 Units at 02/04/22 0836    cyanocobalamin (VITAMIN B-12) tablet 100 mcg, 100 mcg, Oral, Daily, Anton Means MD, 100 mcg at 02/04/22 0836    DULoxetine (CYMBALTA) delayed release capsule 60 mg, 60 mg, Oral, Daily, Anton Means MD, 60 mg at 02/04/22 0836    finasteride (PROSCAR) tablet 5 mg, 5 mg, Oral, Daily, Anton Means MD, 5 mg at 02/04/22 0836    fluticasone-vilanterol (BREO ELLIPTA) 200-25 MCG/INH inhaler 1 puff, 1 puff, Inhalation, Daily, Ferny Colón MD, 1 puff at 02/04/22 0906    furosemide (LASIX) tablet 20 mg, 20 mg, Oral, QPM, Melda Locks, CRNP    furosemide (LASIX) tablet 40 mg, 40 mg, Oral, Daily, Melda Locks, CRNP    glucosamine sulfate capsule 500 mg, 500 mg, Oral, BID, Ferny Colón MD, 500 mg at 02/04/22 1644    guaiFENesin (MUCINEX) 12 hr tablet 600 mg, 600 mg, Oral, Q12H [address], Reola Mariero, CRNP, 600 mg at 02/04/22 2129    iron sucrose (VENOFER) 200 mg in sodium chloride 0 9 % 100 mL IVPB, 200 mg, Intravenous, Daily, Smith Moffett MD, Last Rate: 100 mL/hr at 02/04/22 1149, 200 mg at 02/04/22 1149    levalbuterol (Bria Lush) inhalation solution 0 63 mg, 0 63 mg, Nebulization, Q8H PRN, Ferny Colón MD, 0 63 mg at 01/30/22 5814    levalbuterol (Bria Lush) inhalation solution 1 25 mg, 1 25 mg, Nebulization, BID, Ferny Colón MD, 1 25 mg at 02/04/22 0724    naloxone (NARCAN) 0 04 mg/mL syringe 0 04 mg, 0 04 mg, Intravenous, Q1MIN PRN, Ferny Colón MD    oxyCODONE (ROXICODONE) IR tablet 2 5 mg, 2 5 mg, Oral, Q4H PRN, Ferny Colón MD, 2 5 mg at 02/03/22 0558    oxyCODONE (ROXICODONE) IR tablet 5 mg, 5 mg, Oral, Q4H PRN, Ferny Colón MD, 5 mg at 02/02/22 1011    polyethylene glycol (MIRALAX) packet 17 g, 17 g, Oral, Daily, NAVA Weber, 17 g at 02/04/22 9917    pravastatin (PRAVACHOL) tablet 40 mg, 40 mg, Oral, Daily With Hamilton Fernandez MD, 40 mg at 02/04/22 1644    predniSONE tablet 60 mg, 60 mg, Oral, Daily, JAMILAH WeberNP, 60 mg at 02/04/22 9421    rivaroxaban (XARELTO) tablet 15 mg, 15 mg, Oral, HS, ReNAVA Dia, 15 mg at 02/04/22 2129    senna-docusate sodium (SENOKOT S) 8 6-50 mg per tablet 1 tablet, 1 tablet, Oral, BID, Ferny Colón MD, 1 tablet at 02/04/22 1644    sodium chloride 0 9 % inhalation solution 3 mL, 3 mL, Nebulization, BID, Ferny Colón MD, 3 mL at 02/04/22 1998     Vitals:   Vitals: 02/04/22 2219   BP: 105/55   Pulse:    Resp:    Temp:    SpO2:        Intake/Output:  I/O       02/03 0701 02/04 0700 02/04 0701 02/05 0700    P  O  2040 1056    Total Intake(mL/kg) 2040 (16 1) 1056 (8 3)    Urine (mL/kg/hr) 1775 (0 6) 2800 (0 9)    Stool 0 0    Total Output 1775 2800    Net +265 -1744          Unmeasured Urine Occurrence 1 x     Unmeasured Stool Occurrence 0 x 0 x           Nutrition/GI Proph/Bowel Reg: Senokot S, dulcolax    Physical Exam:   GENERAL APPEARANCE: Patient in no acute distress  HEENT: NCAT; PERRL, EOMs intact; Mucous membranes moist  CV: Regular rate and rhythm; no murmur/gallops/rubs appreciated  CHEST / LUNGS: Clear to auscultation; no wheezes/rales/rhonci  ABD: NABS; soft; non-distended; non-tender  : Voiding spontaneously  EXT: Right hip surgical dressing clean, dry, intact, nontender, compartments soft  +2 pulses bilaterally upper & lower extremities; no edema  NEURO: GCS 15; no focal neurologic deficits; neurovascularly intact  SKIN: Warm, dry and well perfused; no rash; no jaundice  Invasive Devices  Report    Peripheral Intravenous Line            Peripheral IV 02/03/22 Dorsal (posterior); Left Hand 1 day                 Lab Results:   Results: I have personally reviewed pertinent reports   , BMP/CMP:   Lab Results   Component Value Date    SODIUM 136 02/05/2022    K 3 7 02/05/2022     02/05/2022    CO2 31 02/05/2022    BUN 53 (H) 02/05/2022    CREATININE 1 37 (H) 02/05/2022    CALCIUM 9 2 02/05/2022    EGFR 48 02/05/2022    and CBC:   Lab Results   Component Value Date    WBC 10 37 (H) 02/05/2022    HGB 7 5 (L) 02/05/2022    HCT 25 2 (L) 02/05/2022    MCV 85 02/05/2022     02/05/2022    MCH 25 3 (L) 02/05/2022    MCHC 29 8 (L) 02/05/2022    RDW 17 8 (H) 02/05/2022    MPV 9 6 02/05/2022    NRBC 1 02/05/2022     Imaging/EKG Studies: Results: I have personally reviewed pertinent reports      Other Studies:   VAS lower limb venous duplex study, complete bilateral   Final Result by Shiv Cesar DO (02/03 1202)      XR chest portable   Final Result by Biju Wall MD (02/10 2915)      Mild pulmonary edema with trace right effusion and mild right base atelectasis  Workstation performed: [id number]         XR hip/pelv 2-3 vws right if performed   Final Result by Kirstin Montiel MD (01/31 1354)      Fluoroscopy provided for procedure guidance  Please see separate procedure note for details  Workstation performed: [id number]         XR chest portable   Final Result by Biju Wall MD (01/30 1322)      Mild pulmonary venous congestion with trace right effusion  Workstation performed: [id number]         XR spine thoracic 3 vw   Final Result by Kevin Becker MD (01/30 7194)      No acute osseous abnormality  Workstation performed: [id number]         XR spine lumbar 2 or 3 views injury   Final Result by Kevin Becker MD (01/30 7196)      Normal examination  Workstation performed: [id number]         XR chest 1 view portable   Final Result by Paige Conde MD (01/29 1224)      No acute cardiopulmonary disease  Workstation performed: [id number]         XR pelvis ap only 1 or 2 vw   Final Result by Paige Conde MD (01/29 1227)      Acute intertrochanteric fracture of the right proximal femur  No associated hip dislocation  Workstation performed: [id number]         XR femur 2 vw right   Final Result by Paige Conde MD (01/29 1230)      Acute proximal femoral intertrochanteric fracture  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

## 2022-02-05 NOTE — ASSESSMENT & PLAN NOTE
- Pt reports he hasnt had a BM in 8 days  - Multimodal bowel regimen since admission without relief  - Passing gas  - Enema today

## 2022-02-05 NOTE — PLAN OF CARE
Problem: Potential for Falls  Goal: Patient will remain free of falls  Description: INTERVENTIONS:  - Educate patient/family on patient safety including physical limitations  - Instruct patient to call for assistance with activity   - Consult OT/PT to assist with strengthening/mobility   - Keep Call bell within reach  - Keep bed low and locked with side rails adjusted as appropriate  - Keep care items and personal belongings within reach  - Initiate and maintain comfort rounds  - Make Fall Risk Sign visible to staff  - Offer Toileting every 2 Hours, in advance of need  - Initiate/Maintain alarm  - Obtain necessary fall risk management equipment  - Apply yellow socks and bracelet for high fall risk patients  - Consider moving patient to room near nurses station  Outcome: Progressing     Problem: Prexisting or High Potential for Compromised Skin Integrity  Goal: Skin integrity is maintained or improved  Description: INTERVENTIONS:  - Identify patients at risk for skin breakdown  - Assess and monitor skin integrity  - Assess and monitor nutrition and hydration status  - Monitor labs   - Assess for incontinence   - Turn and reposition patient  - Assist with mobility/ambulation  - Relieve pressure over bony prominences  - Avoid friction and shearing  - Provide appropriate hygiene as needed including keeping skin clean and dry  - Evaluate need for skin moisturizer/barrier cream  - Collaborate with interdisciplinary team   - Patient/family teaching  - Consider wound care consult   Outcome: Progressing     Problem: MOBILITY - ADULT  Goal: Maintain or return to baseline ADL function  Description: INTERVENTIONS:  -  Assess patient's ability to carry out ADLs; assess patient's baseline for ADL function and identify physical deficits which impact ability to perform ADLs (bathing, care of mouth/teeth, toileting, grooming, dressing, etc )  - Assess/evaluate cause of self-care deficits   - Assess range of motion  - Assess patient's mobility; develop plan if impaired  - Assess patient's need for assistive devices and provide as appropriate  - Encourage maximum independence but intervene and supervise when necessary  - Involve family in performance of ADLs  - Assess for home care needs following discharge   - Consider OT consult to assist with ADL evaluation and planning for discharge  - Provide patient education as appropriate  Outcome: Progressing  Goal: Maintains/Returns to pre admission functional level  Description: INTERVENTIONS:  - Perform BMAT or MOVE assessment daily    - Set and communicate daily mobility goal to care team and patient/family/caregiver  - Collaborate with rehabilitation services on mobility goals if consulted  - Perform Range of Motion 3 times a day  - Reposition patient every 2 hours  - Dangle patient 3 times a day  - Stand patient 3 times a day  - Ambulate patient 3 times a day  - Out of bed to chair 3 times a day   - Out of bed for meals 3 times a day  - Out of bed for toileting  - Record patient progress and toleration of activity level   Outcome: Progressing     Problem: Nutrition/Hydration-ADULT  Goal: Nutrient/Hydration intake appropriate for improving, restoring or maintaining nutritional needs  Description: Monitor and assess patient's nutrition/hydration status for malnutrition  Collaborate with interdisciplinary team and initiate plan and interventions as ordered  Monitor patient's weight and dietary intake as ordered or per policy  Utilize nutrition screening tool and intervene as necessary  Determine patient's food preferences and provide high-protein, high-caloric foods as appropriate       INTERVENTIONS:  - Monitor oral intake, urinary output, labs, and treatment plans  - Assess nutrition and hydration status and recommend course of action  - Evaluate amount of meals eaten  - Assist patient with eating if necessary   - Allow adequate time for meals  - Recommend/ encourage appropriate diets, oral nutritional supplements, and vitamin/mineral supplements  - Order, calculate, and assess calorie counts as needed  - Recommend, monitor, and adjust tube feedings and TPN/PPN based on assessed needs  - Assess need for intravenous fluids  - Provide specific nutrition/hydration education as appropriate  - Include patient/family/caregiver in decisions related to nutrition  Outcome: Progressing

## 2022-02-06 LAB
ANION GAP SERPL CALCULATED.3IONS-SCNC: 6 MMOL/L (ref 4–13)
BUN SERPL-MCNC: 49 MG/DL (ref 5–25)
CALCIUM SERPL-MCNC: 9.4 MG/DL (ref 8.3–10.1)
CHLORIDE SERPL-SCNC: 102 MMOL/L (ref 100–108)
CO2 SERPL-SCNC: 29 MMOL/L (ref 21–32)
CREAT SERPL-MCNC: 1.51 MG/DL (ref 0.6–1.3)
ERYTHROCYTE [DISTWIDTH] IN BLOOD BY AUTOMATED COUNT: 19 % (ref 11.6–15.1)
GFR SERPL CREATININE-BSD FRML MDRD: 42 ML/MIN/1.73SQ M
GLUCOSE SERPL-MCNC: 134 MG/DL (ref 65–140)
HCT VFR BLD AUTO: 26 % (ref 36.5–49.3)
HGB BLD-MCNC: 7.9 G/DL (ref 12–17)
MCH RBC QN AUTO: 26 PG (ref 26.8–34.3)
MCHC RBC AUTO-ENTMCNC: 30.4 G/DL (ref 31.4–37.4)
MCV RBC AUTO: 86 FL (ref 82–98)
NRBC BLD AUTO-RTO: 1 /100 WBCS
PLATELET # BLD AUTO: 226 THOUSANDS/UL (ref 149–390)
PMV BLD AUTO: 9.5 FL (ref 8.9–12.7)
POTASSIUM SERPL-SCNC: 3.6 MMOL/L (ref 3.5–5.3)
RBC # BLD AUTO: 3.04 MILLION/UL (ref 3.88–5.62)
SODIUM SERPL-SCNC: 137 MMOL/L (ref 136–145)
WBC # BLD AUTO: 12.92 THOUSAND/UL (ref 4.31–10.16)

## 2022-02-06 PROCEDURE — 99232 SBSQ HOSP IP/OBS MODERATE 35: CPT | Performed by: INTERNAL MEDICINE

## 2022-02-06 PROCEDURE — 85027 COMPLETE CBC AUTOMATED: CPT | Performed by: PHYSICIAN ASSISTANT

## 2022-02-06 PROCEDURE — 99232 SBSQ HOSP IP/OBS MODERATE 35: CPT | Performed by: SURGERY

## 2022-02-06 PROCEDURE — 94668 MNPJ CHEST WALL SBSQ: CPT

## 2022-02-06 PROCEDURE — 94760 N-INVAS EAR/PLS OXIMETRY 1: CPT

## 2022-02-06 PROCEDURE — 80048 BASIC METABOLIC PNL TOTAL CA: CPT | Performed by: PHYSICIAN ASSISTANT

## 2022-02-06 PROCEDURE — 94640 AIRWAY INHALATION TREATMENT: CPT

## 2022-02-06 RX ADMIN — ISODIUM CHLORIDE 3 ML: 0.03 SOLUTION RESPIRATORY (INHALATION) at 07:15

## 2022-02-06 RX ADMIN — Medication 2 TABLET: at 21:26

## 2022-02-06 RX ADMIN — POLYETHYLENE GLYCOL 3350 17 G: 17 POWDER, FOR SOLUTION ORAL at 09:37

## 2022-02-06 RX ADMIN — SENNOSIDES AND DOCUSATE SODIUM 1 TABLET: 50; 8.6 TABLET ORAL at 09:37

## 2022-02-06 RX ADMIN — GUAIFENESIN 600 MG: 600 TABLET, EXTENDED RELEASE ORAL at 09:37

## 2022-02-06 RX ADMIN — LEVALBUTEROL HYDROCHLORIDE 1.25 MG: 1.25 SOLUTION, CONCENTRATE RESPIRATORY (INHALATION) at 07:15

## 2022-02-06 RX ADMIN — ALLOPURINOL 200 MG: 100 TABLET ORAL at 09:37

## 2022-02-06 RX ADMIN — OXYCODONE HYDROCHLORIDE AND ACETAMINOPHEN 500 MG: 500 TABLET ORAL at 09:37

## 2022-02-06 RX ADMIN — Medication 500 MG: at 09:37

## 2022-02-06 RX ADMIN — RIVAROXABAN 15 MG: 15 TABLET, FILM COATED ORAL at 21:26

## 2022-02-06 RX ADMIN — LEVALBUTEROL HYDROCHLORIDE 1.25 MG: 1.25 SOLUTION, CONCENTRATE RESPIRATORY (INHALATION) at 20:51

## 2022-02-06 RX ADMIN — FLUTICASONE FUROATE AND VILANTEROL TRIFENATATE 1 PUFF: 200; 25 POWDER RESPIRATORY (INHALATION) at 09:44

## 2022-02-06 RX ADMIN — ISODIUM CHLORIDE 3 ML: 0.03 SOLUTION RESPIRATORY (INHALATION) at 20:51

## 2022-02-06 RX ADMIN — ACETAMINOPHEN 975 MG: 325 TABLET, FILM COATED ORAL at 05:15

## 2022-02-06 RX ADMIN — FINASTERIDE 5 MG: 5 TABLET, FILM COATED ORAL at 09:37

## 2022-02-06 RX ADMIN — SENNOSIDES AND DOCUSATE SODIUM 1 TABLET: 50; 8.6 TABLET ORAL at 17:48

## 2022-02-06 RX ADMIN — PRAVASTATIN SODIUM 40 MG: 40 TABLET ORAL at 16:51

## 2022-02-06 RX ADMIN — Medication 1000 UNITS: at 09:37

## 2022-02-06 RX ADMIN — ASPIRIN 81 MG CHEWABLE TABLET 81 MG: 81 TABLET CHEWABLE at 09:36

## 2022-02-06 RX ADMIN — FUROSEMIDE 40 MG: 40 TABLET ORAL at 09:37

## 2022-02-06 RX ADMIN — Medication 100 MCG: at 09:37

## 2022-02-06 RX ADMIN — Medication 500 MG: at 17:48

## 2022-02-06 RX ADMIN — FUROSEMIDE 20 MG: 20 TABLET ORAL at 16:51

## 2022-02-06 RX ADMIN — DULOXETINE HYDROCHLORIDE 60 MG: 60 CAPSULE, DELAYED RELEASE ORAL at 09:37

## 2022-02-06 RX ADMIN — GUAIFENESIN 600 MG: 600 TABLET, EXTENDED RELEASE ORAL at 21:26

## 2022-02-06 RX ADMIN — IRON SUCROSE 200 MG: 20 INJECTION, SOLUTION INTRAVENOUS at 09:54

## 2022-02-06 RX ADMIN — ACETAMINOPHEN 975 MG: 325 TABLET, FILM COATED ORAL at 21:26

## 2022-02-06 RX ADMIN — PREDNISONE 60 MG: 20 TABLET ORAL at 09:36

## 2022-02-06 RX ADMIN — ACETAMINOPHEN 975 MG: 325 TABLET, FILM COATED ORAL at 14:46

## 2022-02-06 NOTE — PLAN OF CARE
Problem: Potential for Falls  Goal: Patient will remain free of falls  Description: INTERVENTIONS:  - Educate patient/family on patient safety including physical limitations  - Instruct patient to call for assistance with activity   - Consult OT/PT to assist with strengthening/mobility   - Keep Call bell within reach  - Keep bed low and locked with side rails adjusted as appropriate  - Keep care items and personal belongings within reach  - Initiate and maintain comfort rounds  - Make Fall Risk Sign visible to staff  - Offer Toileting every 2 Hours, in advance of need  - Initiate/Maintain alarm  - Obtain necessary fall risk management equipment  - Apply yellow socks and bracelet for high fall risk patients  - Consider moving patient to room near nurses station  Outcome: Progressing

## 2022-02-06 NOTE — ASSESSMENT & PLAN NOTE
Lab Results   Component Value Date    EGFR 42 02/06/2022    EGFR 48 02/05/2022    EGFR 50 02/04/2022    CREATININE 1 51 (H) 02/06/2022    CREATININE 1 37 (H) 02/05/2022    CREATININE 1 31 (H) 02/04/2022     - Patient with history of chronic hypertension   - Monitor blood pressures with goal systolic pressure less than 160  - Appreciate Cardiology and Nephrology evaluation and recommendations   - Outpatient follow-up with PCP per routine

## 2022-02-06 NOTE — PROGRESS NOTES
NEPHROLOGY PROGRESS NOTE    Duke Cook 80 y o  male MRN: [id number]  Unit/Bed#: W -01 Encounter: [id number]  Reason for Consult:  SHAWNEE and CKD    Patient awake alert sitting in the chair  States that he has been doing okay  Eating little bit better felt better after his bowel movement yesterday  ASSESSMENT/PLAN:  1  Renal    Patient has acute kidney injury on mild chronic renal insufficiency baseline creatinine tends range 1 3-1 5  Patient also had mild hyponatremia which is resolved a sodium concentration is now normal   Creatinine today is 1 5 so is at the upper end of his baseline range  He did get initiated on oral maintenance diuretics so will monitor make sure does not become volume depleted as he is increasing his diet  Monitor renal function volume status on diuretic  Once discharged follow-up with his primary nephrologist    2  Femur fracture    Patient had orthopedic surgery procedure  Awaiting rehab placement  Continue PT and OT  We will sign off please call if we can be of further assistance  SUBJECTIVE:  Review of Systems   Constitutional: Negative for chills, diaphoresis, fever and night sweats  HENT: Negative  Eyes: Negative  Cardiovascular: Negative for chest pain, dyspnea on exertion, orthopnea and palpitations  Respiratory: Negative  Negative for cough, shortness of breath, sputum production and wheezing  Gastrointestinal: Negative for abdominal pain, diarrhea, nausea and vomiting  Genitourinary: Negative for dysuria, flank pain, hematuria and incomplete emptying  Neurological: Negative for dizziness, focal weakness, headaches and light-headedness  Psychiatric/Behavioral: Negative for altered mental status, depression, hallucinations and hypervigilance         OBJECTIVE:  Current Weight: Weight - Scale: 128 kg (283 lb 1 1 oz)  Vitals:Temp (24hrs), Av 8 °F (36 6 °C), Min:97 5 °F (36 4 °C), Max:98 1 °F (36 7 °C)  Current: Temperature: 98 1 °F (36 7 °C)   Blood pressure 121/61, pulse 70, temperature 98 1 °F (36 7 °C), resp  rate 18, height 6' 1" (1 854 m), weight 128 kg (283 lb 1 1 oz), SpO2 90 %  , Body mass index is 37 35 kg/m²  Intake/Output Summary (Last 24 hours) at 2/6/2022 1333  Last data filed at 2/6/2022 1326  Gross per 24 hour   Intake 860 ml   Output 1340 ml   Net -480 ml       Physical Exam: /61   Pulse 70   Temp 98 1 °F (36 7 °C)   Resp 18   Ht 6' 1" (1 854 m)   Wt 128 kg (283 lb 1 1 oz)   SpO2 90%   BMI 37 35 kg/m²   Physical Exam  Constitutional:       General: He is not in acute distress  Appearance: He is not toxic-appearing or diaphoretic  HENT:      Head: Normocephalic and atraumatic  Mouth/Throat:      Mouth: Mucous membranes are moist    Eyes:      General: No scleral icterus  Extraocular Movements: Extraocular movements intact  Cardiovascular:      Rate and Rhythm: Normal rate and regular rhythm  Heart sounds: No friction rub  No gallop  Comments: Mild edema  Pulmonary:      Effort: Pulmonary effort is normal  No respiratory distress  Breath sounds: Normal breath sounds  No wheezing, rhonchi or rales  Abdominal:      General: Bowel sounds are normal  There is no distension  Palpations: Abdomen is soft  Tenderness: There is no abdominal tenderness  There is no rebound  Musculoskeletal:      Cervical back: Normal range of motion and neck supple  Neurological:      Mental Status: He is alert and oriented to person, place, and time  Mental status is at baseline  Psychiatric:         Mood and Affect: Mood normal          Behavior: Behavior normal          Thought Content:  Thought content normal          Judgment: Judgment normal          Medications:    Current Facility-Administered Medications:     acetaminophen (TYLENOL) tablet 975 mg, 975 mg, Oral, Q8H [facility], Dawson Rahman MD, 975 mg at 02/06/22 0515    albuterol (PROVENTIL HFA,VENTOLIN HFA) inhaler 2 puff, 2 puff, Inhalation, Q6H PRN, Masood Brantley MD, 2 puff at 01/30/22 1136    allopurinol (ZYLOPRIM) tablet 200 mg, 200 mg, Oral, Daily, Masood Brantley MD, 200 mg at 02/06/22 0305    ascorbic acid (VITAMIN C) tablet 500 mg, 500 mg, Oral, Daily, Masood Brantley MD, 500 mg at 02/06/22 4869    aspirin chewable tablet 81 mg, 81 mg, Oral, Daily, NAVA Cevallos, 81 mg at 02/06/22 4066    bisacodyl (DULCOLAX) rectal suppository 10 mg, 10 mg, Rectal, Daily, NAVA Deleon, 10 mg at 02/05/22 0908    calcium carbonate-vitamin D (OSCAL-D) 500 mg-200 units per tablet 2 tablet, 2 tablet, Oral, HS, Masood Brantley MD, 2 tablet at 02/05/22 2211    cholecalciferol (VITAMIN D3) tablet 1,000 Units, 1,000 Units, Oral, Daily, Masood Brantley MD, 1,000 Units at 02/06/22 8265    cyanocobalamin (VITAMIN B-12) tablet 100 mcg, 100 mcg, Oral, Daily, Masood Brantley MD, 100 mcg at 02/06/22 1426    DULoxetine (CYMBALTA) delayed release capsule 60 mg, 60 mg, Oral, Daily, Masood Brantley MD, 60 mg at 02/06/22 3486    finasteride (PROSCAR) tablet 5 mg, 5 mg, Oral, Daily, Masood Brantley MD, 5 mg at 02/06/22 0937    fluticasone-vilanterol (BREO ELLIPTA) 200-25 MCG/INH inhaler 1 puff, 1 puff, Inhalation, Daily, Masood Brantley MD, 1 puff at 02/06/22 0944    furosemide (LASIX) tablet 20 mg, 20 mg, Oral, QPM, NAVA Barnhart, 20 mg at 02/05/22 1717    furosemide (LASIX) tablet 40 mg, 40 mg, Oral, Daily, NAVA Coleman, 40 mg at 02/06/22 0333    glucosamine sulfate capsule 500 mg, 500 mg, Oral, BID, Masood Brantley MD, 500 mg at 02/06/22 7141    guaiFENesin (MUCINEX) 12 hr tablet 600 mg, 600 mg, Oral, Q12H [facility], NAVA Deleon, 600 mg at 02/06/22 6953    levalbuterol (XOPENEX) inhalation solution 0 63 mg, 0 63 mg, Nebulization, Q8H PRN, Masood Brantley MD, 0 63 mg at 02/05/22 2119    levalbuterol [facility]) inhalation solution 1 25 mg, 1 25 mg, Nebulization, BID, Masood Brantley MD, 1 25 mg at 02/06/22 0715    naloxone (NARCAN) 0 04 mg/mL syringe 0 04 mg, 0 04 mg, Intravenous, Q1MIN PRN, Anton Means MD    oxyCODONE (ROXICODONE) IR tablet 2 5 mg, 2 5 mg, Oral, Q4H PRN, Anton Means MD, 2 5 mg at 02/03/22 0558    oxyCODONE (ROXICODONE) IR tablet 5 mg, 5 mg, Oral, Q4H PRN, Anton Means MD, 5 mg at 02/02/22 1011    polyethylene glycol (MIRALAX) packet 17 g, 17 g, Oral, Daily, JAMILAH PerezNP, 17 g at 02/06/22 9795    pravastatin (PRAVACHOL) tablet 40 mg, 40 mg, Oral, Daily With Rhea Trujillo MD, 40 mg at 02/05/22 1717    predniSONE tablet 60 mg, 60 mg, Oral, Daily, NAVA Perez, 60 mg at 02/06/22 4769    rivaroxaban (XARELTO) tablet 15 mg, 15 mg, Oral, HS, JAMILAH PerezNP, 15 mg at 02/05/22 2211    senna-docusate sodium (SENOKOT S) 8 6-50 mg per tablet 1 tablet, 1 tablet, Oral, BID, Anton Means MD, 1 tablet at 02/06/22 1492    sodium chloride 0 9 % inhalation solution 3 mL, 3 mL, Nebulization, BID, Anton Means MD, 3 mL at 02/06/22 0715    Laboratory Results:  Lab Results   Component Value Date    WBC 12 92 (H) 02/06/2022    HGB 7 9 (L) 02/06/2022    HCT 26 0 (L) 02/06/2022    MCV 86 02/06/2022     02/06/2022     Lab Results   Component Value Date    SODIUM 137 02/06/2022    K 3 6 02/06/2022     02/06/2022    CO2 29 02/06/2022    BUN 49 (H) 02/06/2022    CREATININE 1 51 (H) 02/06/2022    GLUC 134 02/06/2022    CALCIUM 9 4 02/06/2022     Lab Results   Component Value Date    CALCIUM 9 4 02/06/2022    PHOS 4 2 (H) 11/22/2021     No results found for: LABPROT

## 2022-02-06 NOTE — PROGRESS NOTES
[facility]  Progress Note Sherry Decker 1940, 80 y o  male MRN: [id number]  Unit/Bed#: W -01 Encounter: [id number]  Primary Care Provider: Abbie Angeles DO   Date and time admitted to hospital: 1/29/2022  9:34 AM    Constipation  Assessment & Plan  - Pt reports he hasnt had a BM in 8 days  - Multimodal bowel regimen since admission without relief  - patient had bowel movement x2 on 02/05/2022    Acute blood loss anemia  Assessment & Plan  - acute blood loss anemia post-op right femur IM nail in the setting of CKD  - Pt has not required transfusion  - Appreciate nephrology consult, transfusing IV venofer for 3 doses    Fall  Assessment & Plan  - Status post mechanical fall on ice with the below noted injuries and issues  - Fall precautions  - Geriatric Medicine consultation for evaluation, medication review and recommendations   - PT and OT evaluation and treatment as indicated  - Case Management consultation for disposition planning  CAD (coronary artery disease)  Assessment & Plan  - Patient with chronic significant history of CAD  Patient follows with Dr Earnest Guerra  - Appreciate [facility] evaluation and recommendations including discontinuing Plavix and continuing aspirin on discharge  - No evidence of acute cardiac event at this time  - EKG indicates ventricular pacing rhythm  Pacemaker was interrogated, showing 6 months of battery life left  - Continue home medication therapy as appropriate      - Follow up with Dr Earnest Guerra outpatient    Stage 3a chronic kidney disease [facility])  Assessment & Plan  Lab Results   Component Value Date    EGFR 42 02/06/2022    EGFR 48 02/05/2022    EGFR 50 02/04/2022    CREATININE 1 51 (H) 02/06/2022    CREATININE 1 37 (H) 02/05/2022    CREATININE 1 31 (H) 02/04/2022     - Patient with chronic history of CKD stage 3 with baseline creatinine appearing to be between 1 4 and 1 5   - creatinine improved, at baseline  - appreciate Nephrology consult and recommendations  - avoid hypotension    Lumbar radiculopathy  Assessment & Plan  - Patient with chronic lumbar radiculopathy and pain in his legs  Patient reports acutely worsened pain in his calves and feet since his fall   - Neurovascular exam intact on initial evaluation   - T and L-spine x-rays indicate no fractures  - patient has been complaining of worsening lower extremity pain for the past 3 days  - Lower extremity duplex negative  - Continue multimodal analgesic regimen including gabapentin  APS consulted  - Outpatient follow-up with PCP and pain provider  Chronic a-fib ([facility])  Assessment & Plan  - Chronic history of atrial fibrillation  - continue home Xarelto  - Monitor for adequate heart rate control   - Continue home medication regimen as appropriate otherwise  - Appreciate Cardiology evaluation and recommendations   - Outpatient follow-up per team     Chronic diastolic CHF (congestive heart failure) ([facility])  Assessment & Plan  Wt Readings from Last 3 Encounters:   02/06/22 130 kg (285 lb 11 5 oz)   01/24/22 131 kg (289 lb)   01/06/22 130 kg (286 lb)     - Chronic history of diastolic CHF with evidence of acute pulmonary edema  - cardiology and Nephrology were consulted and note appreciated  - cardiology and Nephrology have signed off  - echocardiogram 1/31:  EF 65%  - SHAWNEE improved to baseline kidney function  Continue home PO diuresis  - Oxygen requirements improving, currently on room air  - Home oral diuretics restarted      Benign hypertension with chronic kidney disease, stage III [facility])  Assessment & Plan  Lab Results   Component Value Date    EGFR 42 02/06/2022    EGFR 48 02/05/2022    EGFR 50 02/04/2022    CREATININE 1 51 (H) 02/06/2022    CREATININE 1 37 (H) 02/05/2022    CREATININE 1 31 (H) 02/04/2022     - Patient with history of chronic hypertension   - Monitor blood pressures with goal systolic pressure less than 160     - Appreciate Cardiology and Nephrology evaluation and recommendations   - Outpatient follow-up with PCP per routine  Pain in both lower extremities  Assessment & Plan  · Patient complaining of calf pain  · History of lumbar radiculopathy and chronic lower extremity pain and notes that his right lower extremity pain is worse than usual   · Neurovascularly intact  · Negative Homans sign  · Bilateral lower extremity duplex negative for acute or chronic thrombus  · Chronic    Chronic pain syndrome  Assessment & Plan  - Patient with chronic pain syndrome   - Continue multimodal analgesic regimen with additional agents added/adjustments made in setting of acute pain secondary to traumatic injury  - Bowel regimen while on opioid therapy  - APS consult to assist with acute on chronic pain  - Outpatient follow-up with PCP and pain provider  * Closed fracture of right femur [facility])  Assessment & Plan  - Acute right intertrochanteric femur fracture, present on admission   - Appreciate Orthopedic surgery evaluation and recommendations   - 1/31 right femur IM nail  - Maintain WBAT RLE  - Monitor right lower extremity neurovascular exam   - Continue multimodal analgesic regimen-pain controlled  - DVT prophylaxis--Xarelto  - PT and OT evaluation and treatment as indicated  - follow-up with orthopedics as an outpatient  DVT prophylaxis: SCDs and Xarelto  PT and OT:  Eval and treat    Disposition:  DC planning  Patient currently on room air  Nephrology and Cardiology have now signed off  Patient medically appropriate for discharge    SUBJECTIVE:  Chief Complaint: "No new complaints "    Subjective:  Patient is offering no new complaints today on presentation  Reports he is feeling much better  Denying any new pain        OBJECTIVE:     Meds/Allergies     Current Facility-Administered Medications:     acetaminophen (TYLENOL) tablet 975 mg, 975 mg, Oral, Q8H [address], Abigail Prieto MD, 975 mg at 02/06/22 0515    albuterol (PROVENTIL HFA,VENTOLIN HFA) inhaler 2 puff, 2 puff, Inhalation, Q6H PRN, Ferny Colón MD, 2 puff at 01/30/22 1136    allopurinol (ZYLOPRIM) tablet 200 mg, 200 mg, Oral, Daily, Ferny Colón MD, 200 mg at 02/05/22 0908    ascorbic acid (VITAMIN C) tablet 500 mg, 500 mg, Oral, Daily, Ferny Colón MD, 500 mg at 02/05/22 0909    aspirin chewable tablet 81 mg, 81 mg, Oral, Daily, NAVA Pulido, 81 mg at 02/05/22 2552    bisacodyl (DULCOLAX) rectal suppository 10 mg, 10 mg, Rectal, Daily, NAVA Weber, 10 mg at 02/05/22 0908    calcium carbonate-vitamin D (OSCAL-D) 500 mg-200 units per tablet 2 tablet, 2 tablet, Oral, HS, Ferny Colón MD, 2 tablet at 02/05/22 2211    cholecalciferol (VITAMIN D3) tablet 1,000 Units, 1,000 Units, Oral, Daily, Ferny Colón MD, 1,000 Units at 02/05/22 0909    cyanocobalamin (VITAMIN B-12) tablet 100 mcg, 100 mcg, Oral, Daily, Ferny Colón MD, 100 mcg at 02/05/22 0908    DULoxetine (CYMBALTA) delayed release capsule 60 mg, 60 mg, Oral, Daily, Ferny Colón MD, 60 mg at 02/05/22 0908    finasteride (PROSCAR) tablet 5 mg, 5 mg, Oral, Daily, Ferny Colón MD, 5 mg at 02/05/22 0908    fluticasone-vilanterol (BREO ELLIPTA) 200-25 MCG/INH inhaler 1 puff, 1 puff, Inhalation, Daily, Ferny Colón MD, 1 puff at 02/05/22 0910    furosemide (LASIX) tablet 20 mg, 20 mg, Oral, QPM, NAVA Barnhart, 20 mg at 02/05/22 1717    furosemide (LASIX) tablet 40 mg, 40 mg, Oral, Daily, NAVA Medina, 40 mg at 02/05/22 0908    glucosamine sulfate capsule 500 mg, 500 mg, Oral, BID, Ferny Colón MD, 500 mg at 02/05/22 1717    guaiFENesin (MUCINEX) 12 hr tablet 600 mg, 600 mg, Oral, Q12H [facility], NAVA Weber, 600 mg at 02/05/22 2211    iron sucrose (VENOFER) 200 mg in sodium chloride 0 9 % 100 mL IVPB, 200 mg, Intravenous, Daily, Unknown MD Betina, Stopped at 02/05/22 1010    levalbuterol (XOPENEX) inhalation solution 0 63 mg, 0 63 mg, Nebulization, Q8H PRN, Anna Mohr MD, 0 63 mg at 02/05/22 2119    levalbuterol [facility]) inhalation solution 1 25 mg, 1 25 mg, Nebulization, BID, Anna Mohr MD, 1 25 mg at 02/06/22 0715    naloxone (NARCAN) 0 04 mg/mL syringe 0 04 mg, 0 04 mg, Intravenous, Q1MIN PRN, Anna Mohr MD    oxyCODONE (ROXICODONE) IR tablet 2 5 mg, 2 5 mg, Oral, Q4H PRN, Anna Mohr MD, 2 5 mg at 02/03/22 0558    oxyCODONE (ROXICODONE) IR tablet 5 mg, 5 mg, Oral, Q4H PRN, Anna Mohr MD, 5 mg at 02/02/22 1011    polyethylene glycol (MIRALAX) packet 17 g, 17 g, Oral, Daily, NAVA Harding, 17 g at 02/05/22 0908    pravastatin (PRAVACHOL) tablet 40 mg, 40 mg, Oral, Daily With Cecelia Reno MD, 40 mg at 02/05/22 1717    predniSONE tablet 60 mg, 60 mg, Oral, Daily, NAVA Harding, 60 mg at 02/05/22 0908    rivaroxaban (XARELTO) tablet 15 mg, 15 mg, Oral, HS, NAVA Harding, 15 mg at 02/05/22 2211    senna-docusate sodium (SENOKOT S) 8 6-50 mg per tablet 1 tablet, 1 tablet, Oral, BID, Anna Mohr MD, 1 tablet at 02/05/22 1717    sodium chloride 0 9 % inhalation solution 3 mL, 3 mL, Nebulization, BID, Anna Mohr MD, 3 mL at 02/06/22 0715     Vitals:   Vitals:    02/06/22 0748   BP: 121/61   Pulse: 70   Resp: 18   Temp: 98 1 °F (36 7 °C)   SpO2: 90%       Intake/Output:  I/O       02/04 0701 02/05 0700 02/05 0701 02/06 0700 02/06 0701 02/07 0700    P  O  1056 480     IV Piggyback  100     Total Intake(mL/kg) 1056 (8) 580 (4 5)     Urine (mL/kg/hr) 3175 (1) 1650 (0 5) 240 (0 9)    Stool 0 0     Total Output 3175 1650 240    Net -2119 -1070 -240           Unmeasured Stool Occurrence 0 x 1 x            Nutrition/GI Proph/Bowel Reg:  Continue current diet    Physical Exam:   GENERAL APPEARANCE:  No acute distress  NEURO:  GCS 15  HEENT:  Normocephalic  CV:  Regular rate and rhythm  LUNGS:  CTA bilaterally  GI:  Nontender, nondistended  :  No Roca  MSK:  +2 pulses on extremities  SKIN:  Warm, dry intact           Invasive Devices  Report    Peripheral Intravenous Line            Peripheral IV 02/03/22 Dorsal (posterior); Left Hand 2 days                 Lab Results:   Results: I have personally reviewed pertinent reports   , BMP/CMP:   Lab Results   Component Value Date    SODIUM 137 02/06/2022    K 3 6 02/06/2022     02/06/2022    CO2 29 02/06/2022    BUN 49 (H) 02/06/2022    CREATININE 1 51 (H) 02/06/2022    CALCIUM 9 4 02/06/2022    EGFR 42 02/06/2022    and CBC:   Lab Results   Component Value Date    WBC 12 92 (H) 02/06/2022    HGB 7 9 (L) 02/06/2022    HCT 26 0 (L) 02/06/2022    MCV 86 02/06/2022     02/06/2022    MCH 26 0 (L) 02/06/2022    MCHC 30 4 (L) 02/06/2022    RDW 19 0 (H) 02/06/2022    MPV 9 5 02/06/2022    NRBC 1 02/06/2022     Imaging/EKG Studies: Results: I have personally reviewed pertinent reports      Other Studies: no other studies  VTE Prophylaxis: SCDs and Xarelto

## 2022-02-06 NOTE — ASSESSMENT & PLAN NOTE
Wt Readings from Last 3 Encounters:   02/06/22 130 kg (285 lb 11 5 oz)   01/24/22 131 kg (289 lb)   01/06/22 130 kg (286 lb)     - Chronic history of diastolic CHF with evidence of acute pulmonary edema  - cardiology and Nephrology were consulted and note appreciated  - cardiology and Nephrology have signed off  - echocardiogram 1/31:  EF 65%  - SHAWNEE improved to baseline kidney function  Continue home PO diuresis      - Oxygen requirements improving, currently on room air  - Home oral diuretics restarted

## 2022-02-06 NOTE — ASSESSMENT & PLAN NOTE
- Pt reports he hasnt had a BM in 8 days  - Multimodal bowel regimen since admission without relief  - patient had bowel movement x2 on 02/05/2022

## 2022-02-06 NOTE — PLAN OF CARE
Problem: Potential for Falls  Goal: Patient will remain free of falls  Description: INTERVENTIONS:  - Educate patient/family on patient safety including physical limitations  - Instruct patient to call for assistance with activity   - Consult OT/PT to assist with strengthening/mobility   - Keep Call bell within reach  - Keep bed low and locked with side rails adjusted as appropriate  - Keep care items and personal belongings within reach  - Initiate and maintain comfort rounds  - Make Fall Risk Sign visible to staff  - Offer Toileting every 2 Hours, in advance of need  - Initiate/Maintain alarm  - Obtain necessary fall risk management equipment  - Apply yellow socks and bracelet for high fall risk patients  - Consider moving patient to room near nurses station  Outcome: Progressing     Problem: Prexisting or High Potential for Compromised Skin Integrity  Goal: Skin integrity is maintained or improved  Description: INTERVENTIONS:  - Identify patients at risk for skin breakdown  - Assess and monitor skin integrity  - Assess and monitor nutrition and hydration status  - Monitor labs   - Assess for incontinence   - Turn and reposition patient  - Assist with mobility/ambulation  - Relieve pressure over bony prominences  - Avoid friction and shearing  - Provide appropriate hygiene as needed including keeping skin clean and dry  - Evaluate need for skin moisturizer/barrier cream  - Collaborate with interdisciplinary team   - Patient/family teaching  - Consider wound care consult   Outcome: Progressing     Problem: MOBILITY - ADULT  Goal: Maintain or return to baseline ADL function  Description: INTERVENTIONS:  -  Assess patient's ability to carry out ADLs; assess patient's baseline for ADL function and identify physical deficits which impact ability to perform ADLs (bathing, care of mouth/teeth, toileting, grooming, dressing, etc )  - Assess/evaluate cause of self-care deficits   - Assess range of motion  - Assess patient's mobility; develop plan if impaired  - Assess patient's need for assistive devices and provide as appropriate  - Encourage maximum independence but intervene and supervise when necessary  - Involve family in performance of ADLs  - Assess for home care needs following discharge   - Consider OT consult to assist with ADL evaluation and planning for discharge  - Provide patient education as appropriate  Outcome: Progressing  Goal: Maintains/Returns to pre admission functional level  Description: INTERVENTIONS:  - Perform BMAT or MOVE assessment daily    - Set and communicate daily mobility goal to care team and patient/family/caregiver  - Collaborate with rehabilitation services on mobility goals if consulted  - Perform Range of Motion 3 times a day  - Reposition patient every 2 hours  - Dangle patient 3 times a day  - Stand patient 3 times a day  - Ambulate patient 3 times a day  - Out of bed to chair 3 times a day   - Out of bed for meals 3 times a day  - Out of bed for toileting  - Record patient progress and toleration of activity level   Outcome: Progressing     Problem: Nutrition/Hydration-ADULT  Goal: Nutrient/Hydration intake appropriate for improving, restoring or maintaining nutritional needs  Description: Monitor and assess patient's nutrition/hydration status for malnutrition  Collaborate with interdisciplinary team and initiate plan and interventions as ordered  Monitor patient's weight and dietary intake as ordered or per policy  Utilize nutrition screening tool and intervene as necessary  Determine patient's food preferences and provide high-protein, high-caloric foods as appropriate       INTERVENTIONS:  - Monitor oral intake, urinary output, labs, and treatment plans  - Assess nutrition and hydration status and recommend course of action  - Evaluate amount of meals eaten  - Assist patient with eating if necessary   - Allow adequate time for meals  - Recommend/ encourage appropriate diets, oral nutritional supplements, and vitamin/mineral supplements  - Order, calculate, and assess calorie counts as needed  - Recommend, monitor, and adjust tube feedings and TPN/PPN based on assessed needs  - Assess need for intravenous fluids  - Provide specific nutrition/hydration education as appropriate  - Include patient/family/caregiver in decisions related to nutrition  Outcome: Progressing     Problem: PAIN - ADULT  Goal: Verbalizes/displays adequate comfort level or baseline comfort level  Description: Interventions:  - Encourage patient to monitor pain and request assistance  - Assess pain using appropriate pain scale  - Administer analgesics based on type and severity of pain and evaluate response  - Implement non-pharmacological measures as appropriate and evaluate response  - Consider cultural and social influences on pain and pain management  - Notify physician/advanced practitioner if interventions unsuccessful or patient reports new pain  Outcome: Progressing     Problem: INFECTION - ADULT  Goal: Absence or prevention of progression during hospitalization  Description: INTERVENTIONS:  - Assess and monitor for signs and symptoms of infection  - Monitor lab/diagnostic results  - Monitor all insertion sites, i e  indwelling lines, tubes, and drains  - Monitor endotracheal if appropriate and nasal secretions for changes in amount and color  - [place] appropriate cooling/warming therapies per order  - Administer medications as ordered  - Instruct and encourage patient and family to use good hand hygiene technique  - Identify and instruct in appropriate isolation precautions for identified infection/condition  Outcome: Progressing     Problem: SAFETY ADULT  Goal: Patient will remain free of falls  Description: INTERVENTIONS:  - Educate patient/family on patient safety including physical limitations  - Instruct patient to call for assistance with activity   - Consult OT/PT to assist with strengthening/mobility   - Keep Call bell within reach  - Keep bed low and locked with side rails adjusted as appropriate  - Keep care items and personal belongings within reach  - Initiate and maintain comfort rounds  - Make Fall Risk Sign visible to staff  - Offer Toileting every 2 Hours, in advance of need  - Initiate/Maintain alarm  - Obtain necessary fall risk management equipment  - Apply yellow socks and bracelet for high fall risk patients  - Consider moving patient to room near nurses station  Outcome: Progressing  Goal: Maintain or return to baseline ADL function  Description: INTERVENTIONS:  -  Assess patient's ability to carry out ADLs; assess patient's baseline for ADL function and identify physical deficits which impact ability to perform ADLs (bathing, care of mouth/teeth, toileting, grooming, dressing, etc )  - Assess/evaluate cause of self-care deficits   - Assess range of motion  - Assess patient's mobility; develop plan if impaired  - Assess patient's need for assistive devices and provide as appropriate  - Encourage maximum independence but intervene and supervise when necessary  - Involve family in performance of ADLs  - Assess for home care needs following discharge   - Consider OT consult to assist with ADL evaluation and planning for discharge  - Provide patient education as appropriate  Outcome: Progressing  Goal: Maintains/Returns to pre admission functional level  Description: INTERVENTIONS:  - Perform BMAT or MOVE assessment daily    - Set and communicate daily mobility goal to care team and patient/family/caregiver  - Collaborate with rehabilitation services on mobility goals if consulted  - Perform Range of Motion 3 times a day  - Reposition patient every 2 hours    - Dangle patient 3 times a day  - Stand patient 3 times a day  - Ambulate patient 3 times a day  - Out of bed to chair 3 times a day   - Out of bed for meals 3 times a day  - Out of bed for toileting  - Record patient progress and toleration of activity level   Outcome: Progressing     Problem: DISCHARGE PLANNING  Goal: Discharge to home or other facility with appropriate resources  Description: INTERVENTIONS:  - Identify barriers to discharge w/patient and caregiver  - Arrange for needed discharge resources and transportation as appropriate  - Identify discharge learning needs (meds, wound care, etc )  - Arrange for interpretive services to assist at discharge as needed  - Refer to Case Management Department for coordinating discharge planning if the patient needs post-hospital services based on physician/advanced practitioner order or complex needs related to functional status, cognitive ability, or social support system  Outcome: Progressing     Problem: Knowledge Deficit  Goal: Patient/family/caregiver demonstrates understanding of disease process, treatment plan, medications, and discharge instructions  Description: Complete learning assessment and assess knowledge base    Interventions:  - Provide teaching at level of understanding  - Provide teaching via preferred learning methods  Outcome: Progressing

## 2022-02-06 NOTE — ASSESSMENT & PLAN NOTE
Lab Results   Component Value Date    EGFR 42 02/06/2022    EGFR 48 02/05/2022    EGFR 50 02/04/2022    CREATININE 1 51 (H) 02/06/2022    CREATININE 1 37 (H) 02/05/2022    CREATININE 1 31 (H) 02/04/2022     - Patient with chronic history of CKD stage 3 with baseline creatinine appearing to be between 1 4 and 1 5   - creatinine improved, at baseline  - appreciate Nephrology consult and recommendations  - avoid hypotension

## 2022-02-06 NOTE — ASSESSMENT & PLAN NOTE
- acute blood loss anemia post-op right femur IM nail in the setting of CKD  - Pt has not required transfusion  - Appreciate nephrology consult, transfusing IV venofer for 3 doses

## 2022-02-06 NOTE — ASSESSMENT & PLAN NOTE
- Patient with chronic significant history of CAD  Patient follows with Dr Len Ca  - Appreciate [facility] evaluation and recommendations including discontinuing Plavix and continuing aspirin on discharge  - No evidence of acute cardiac event at this time  - EKG indicates ventricular pacing rhythm  Pacemaker was interrogated, showing 6 months of battery life left  - Continue home medication therapy as appropriate      - Follow up with Dr Len Ca outpatient

## 2022-02-07 VITALS
OXYGEN SATURATION: 93 % | SYSTOLIC BLOOD PRESSURE: 126 MMHG | BODY MASS INDEX: 37.69 KG/M2 | WEIGHT: 284.39 LBS | DIASTOLIC BLOOD PRESSURE: 60 MMHG | TEMPERATURE: 98.7 F | HEIGHT: 73 IN | RESPIRATION RATE: 18 BRPM | HEART RATE: 70 BPM

## 2022-02-07 LAB
FLUAV RNA RESP QL NAA+PROBE: NEGATIVE
FLUBV RNA RESP QL NAA+PROBE: NEGATIVE
RSV RNA RESP QL NAA+PROBE: NEGATIVE
SARS-COV-2 RNA RESP QL NAA+PROBE: NEGATIVE

## 2022-02-07 PROCEDURE — NC001 PR NO CHARGE: Performed by: PHYSICIAN ASSISTANT

## 2022-02-07 PROCEDURE — 94640 AIRWAY INHALATION TREATMENT: CPT

## 2022-02-07 PROCEDURE — 94668 MNPJ CHEST WALL SBSQ: CPT

## 2022-02-07 PROCEDURE — 94760 N-INVAS EAR/PLS OXIMETRY 1: CPT

## 2022-02-07 PROCEDURE — 99238 HOSP IP/OBS DSCHRG MGMT 30/<: CPT | Performed by: SURGERY

## 2022-02-07 PROCEDURE — 0241U HB NFCT DS VIR RESP RNA 4 TRGT: CPT | Performed by: PHYSICIAN ASSISTANT

## 2022-02-07 PROCEDURE — 99024 POSTOP FOLLOW-UP VISIT: CPT | Performed by: PHYSICIAN ASSISTANT

## 2022-02-07 RX ORDER — FUROSEMIDE 20 MG/1
20 TABLET ORAL EVERY EVENING
Refills: 0
Start: 2022-02-07

## 2022-02-07 RX ORDER — FUROSEMIDE 40 MG/1
40 TABLET ORAL DAILY
Refills: 0
Start: 2022-02-08 | End: 2022-03-13

## 2022-02-07 RX ORDER — OXYCODONE HYDROCHLORIDE 5 MG/1
TABLET ORAL
Qty: 30 TABLET | Refills: 0 | Status: SHIPPED | OUTPATIENT
Start: 2022-02-07 | End: 2022-02-19 | Stop reason: SDUPTHER

## 2022-02-07 RX ORDER — ASPIRIN 81 MG/1
81 TABLET, CHEWABLE ORAL DAILY
Refills: 0
Start: 2022-02-08 | End: 2022-04-02

## 2022-02-07 RX ADMIN — ISODIUM CHLORIDE 3 ML: 0.03 SOLUTION RESPIRATORY (INHALATION) at 07:19

## 2022-02-07 RX ADMIN — FINASTERIDE 5 MG: 5 TABLET, FILM COATED ORAL at 08:31

## 2022-02-07 RX ADMIN — Medication 500 MG: at 17:48

## 2022-02-07 RX ADMIN — ALLOPURINOL 200 MG: 100 TABLET ORAL at 08:31

## 2022-02-07 RX ADMIN — Medication 100 MCG: at 08:31

## 2022-02-07 RX ADMIN — Medication 500 MG: at 08:31

## 2022-02-07 RX ADMIN — FUROSEMIDE 20 MG: 20 TABLET ORAL at 16:35

## 2022-02-07 RX ADMIN — SENNOSIDES AND DOCUSATE SODIUM 1 TABLET: 50; 8.6 TABLET ORAL at 17:48

## 2022-02-07 RX ADMIN — SENNOSIDES AND DOCUSATE SODIUM 1 TABLET: 50; 8.6 TABLET ORAL at 08:31

## 2022-02-07 RX ADMIN — LEVALBUTEROL HYDROCHLORIDE 1.25 MG: 1.25 SOLUTION, CONCENTRATE RESPIRATORY (INHALATION) at 07:19

## 2022-02-07 RX ADMIN — ACETAMINOPHEN 975 MG: 325 TABLET, FILM COATED ORAL at 05:00

## 2022-02-07 RX ADMIN — DULOXETINE HYDROCHLORIDE 60 MG: 60 CAPSULE, DELAYED RELEASE ORAL at 08:31

## 2022-02-07 RX ADMIN — Medication 1000 UNITS: at 08:31

## 2022-02-07 RX ADMIN — PREDNISONE 60 MG: 20 TABLET ORAL at 08:31

## 2022-02-07 RX ADMIN — FLUTICASONE FUROATE AND VILANTEROL TRIFENATATE 1 PUFF: 200; 25 POWDER RESPIRATORY (INHALATION) at 08:37

## 2022-02-07 RX ADMIN — POLYETHYLENE GLYCOL 3350 17 G: 17 POWDER, FOR SOLUTION ORAL at 08:31

## 2022-02-07 RX ADMIN — OXYCODONE HYDROCHLORIDE AND ACETAMINOPHEN 500 MG: 500 TABLET ORAL at 08:31

## 2022-02-07 RX ADMIN — PRAVASTATIN SODIUM 40 MG: 40 TABLET ORAL at 16:35

## 2022-02-07 RX ADMIN — ASPIRIN 81 MG CHEWABLE TABLET 81 MG: 81 TABLET CHEWABLE at 08:31

## 2022-02-07 RX ADMIN — FUROSEMIDE 40 MG: 40 TABLET ORAL at 08:31

## 2022-02-07 RX ADMIN — ACETAMINOPHEN 975 MG: 325 TABLET, FILM COATED ORAL at 13:09

## 2022-02-07 RX ADMIN — BISACODYL 10 MG: 10 SUPPOSITORY RECTAL at 08:31

## 2022-02-07 RX ADMIN — GUAIFENESIN 600 MG: 600 TABLET, EXTENDED RELEASE ORAL at 08:31

## 2022-02-07 NOTE — ASSESSMENT & PLAN NOTE
Wt Readings from Last 3 Encounters:   02/07/22 129 kg (284 lb 6 3 oz)   01/24/22 131 kg (289 lb)   01/06/22 130 kg (286 lb)     - Chronic history of diastolic CHF with evidence of acute pulmonary edema  - cardiology and Nephrology were consulted and note appreciated  - cardiology and Nephrology have signed off  - echocardiogram 1/31:  EF 65%  - SHAWNEE improved to baseline kidney function  Continue home PO diuresis      - Oxygen requirements improving, currently on room air  - Home oral diuretics restarted

## 2022-02-07 NOTE — DISCHARGE SUMMARY
Discharge Summary - Misa Lucero 80 y o  male MRN: [id number]    Unit/Bed#: W -01 Encounter: [id number]    Admission Date:   Admission Orders (From admission, onward)     Ordered        01/29/22 1243  Inpatient Admission  Once                        Admitting Diagnosis: Acute on chronic diastolic heart failure ([facility] ) [I50 33]  Hip injury [S79 919A]  Dyspnea on exertion [R06 00]  Intertrochanteric fracture ([facility] ) [S72 143A]  Contusion of right elbow, initial encounter [S50 01XA]  Abrasion of right elbow, initial encounter [S50 311A]  Coronary artery disease involving native coronary artery of native heart without angina pectoris [I25 10]  Closed fracture of right femur, unspecified fracture morphology, initial encounter ([place] [address] ) [S72 91XA]  Stage 3a chronic kidney disease ([place] [address] ) [N18 31]    HPI: Per Tanisha Lazar, "Misa Lucero is a 80 y o  male who presents with leg pain, most notably right hip pain  Patient was out plowing snow and got out of his vehicle when he slipped on ice and fell to the ground  He denied head strike or loss of consciousness  He noted right hip pain following his fall  He also notes significant pain in his calves and feet which is chronic, but worse than usual   Mechanism:Fall"    Procedures Performed:   Orders Placed This Encounter   Procedures    ED ECG Documentation Only       Summary of Hospital Course:  Patient is an 66-year-old male comes in for evaluation status post fall  He slipped and fell out of his vehicle  He was noted to have a right femur fracture which required operative repair with Orthopedics  Did well in the postoperative setting  Both cardiology and Nephrology were consulted in the perioperative care secondary to his comorbidities  He was aggressively diuresed and monitored closely  Would be resumed on aspirin and Xarelto 1st coronary artery disease  Nephrology use gentle hydration as well intermittently to help relieve improved his creatinine    He would eventually be discharged after PT and OT recommended rehab  He would follow up outpatient with Cardiology, Nephrology, orthopedics  He would also have outpatient follow-up with PCP  Significant Findings, Care, Treatment and Services Provided: XR chest portable    Result Date: 2/2/2022  Impression: Mild pulmonary edema with trace right effusion and mild right base atelectasis   Workstation performed: [id number]       Complications: no complications    Discharge Diagnosis:   Patient Active Problem List   Diagnosis    Chronic pain syndrome    Bilateral lumbar radiculopathy    Lumbar canal stenosis    Pain in both lower extremities    JOO (generalized anxiety disorder)    Benign hypertension with chronic kidney disease, stage III (HCC)    Benign prostatic hyperplasia    Chronic diastolic CHF (congestive heart failure) (HCC)    Allergic rhinitis    Aneurysm of abdominal aorta (HCC)    Chronic a-fib (HCC)    Carpal tunnel syndrome    Chronic gout without tophus    Centrilobular emphysema (HCC)    Deep venous insufficiency    Difficulty in walking    Fecal soiling    Fibromyalgia    Fistula-in-ano    Hyperlipidemia    Moderate or severe vision impairment, one eye    Class 2 obesity due to excess calories without serious comorbidity with body mass index (BMI) of 36 0 to 36 9 in adult    Pacemaker    JOVI (obstructive sleep apnea)    Urinary incontinence    Venous insufficiency (chronic) (peripheral)    Peripheral arteriosclerosis (HCC)    Lumbar radiculopathy    Serum total bilirubin elevated    Stage 3a chronic kidney disease (HCC)    CAD (coronary artery disease)    Status post primary angioplasty with coronary stent    Mixed simple and mucopurulent chronic bronchitis (HCC)    Hyperuricemia    Bilateral leg edema    Nephrolithiasis    Former smoker    Anemia    Vitamin D insufficiency    Chronic constipation    Medicare annual wellness visit, subsequent    Idiopathic hematuria    Kidney stone on left side    Flank pain    Obesity (BMI 30-39  9)    Dyspnea on exertion    Neuropathy    Coronary artery arteriosclerosis    Essential hypertension    Memory difficulty    COPD (chronic obstructive pulmonary disease) (HCC)    Acute on chronic diastolic heart failure (HCC)    Skin tear of right upper arm without complication    Contusion, buttock, initial encounter    Wheezing    Fall    Closed fracture of right femur ([facility] )    Acute blood loss anemia    Constipation         Medical Problems             Resolved Problems  Date Reviewed: 2/7/2022          Resolved    Acute kidney injury superimposed on chronic kidney disease ([facility] ) 2/4/2022     Resolved by  Shari Mancia PA-C    Hyponatremia 2/4/2022     Resolved by  Shari Mancia PA-C                Condition at Discharge: good         Discharge instructions/Information to patient and family:   See after visit summary for information provided to patient and family  Provisions for Follow-Up Care:  See after visit summary for information related to follow-up care and any pertinent home health orders  PCP: Luis Morgan DO    Disposition: rehab    Planned Readmission: No      Discharge Statement   I spent 22 minutes discharging the patient  This time was spent on the day of discharge  I had direct contact with the patient on the day of discharge  Additional documentation is required if more than 30 minutes were spent on discharge  Discharge Medications:  See after visit summary for reconciled discharge medications provided to patient and family

## 2022-02-07 NOTE — CASE MANAGEMENT
Case Management Assessment & Discharge Planning Note    Patient name Nahed Jj  Location W /W -13 MRN [id number]  : 1940 Date 2022       Current Admission Date: 2022  Current Admission Diagnosis:Closed fracture of right femur [facility])   Patient Active Problem List    Diagnosis Date Noted    Acute blood loss anemia 2022    Constipation 2022    Fall 2022    Closed fracture of right femur ([facility] ) 2022    Wheezing 2021    Skin tear of right upper arm without complication 3458    Contusion, buttock, initial encounter 2021    COPD (chronic obstructive pulmonary disease) ([address] ) 2021    Acute on chronic diastolic heart failure ([address] ) 2021    Memory difficulty 2021    Neuropathy 2021    Coronary artery arteriosclerosis 2021    Essential hypertension 2021    Dyspnea on exertion     Idiopathic hematuria 2021    Kidney stone on left side 2021    Flank pain 2021    Obesity (BMI 30-39 9) 2021    Chronic constipation 2021    Medicare annual wellness visit, subsequent 2021    Vitamin D insufficiency 2020    Anemia 2020    Former smoker 2020    Nephrolithiasis 10/30/2019    Bilateral leg edema 2019    Hyperuricemia 2019    Mixed simple and mucopurulent chronic bronchitis ([address] ) 2019    CAD (coronary artery disease) 2019    Status post primary angioplasty with coronary stent 2019    Stage 3a chronic kidney disease ([address] ) 2019    Serum total bilirubin elevated 2019    Peripheral arteriosclerosis ([address] ) 2019    Lumbar radiculopathy 2019    Pain in both lower extremities 2018    Benign hypertension with chronic kidney disease, stage III ([address] ) 2017    JOO (generalized anxiety disorder) 2016    Chronic pain syndrome 2016    Bilateral lumbar radiculopathy 2016  Lumbar canal stenosis 03/17/2016    Difficulty in walking 09/11/2015    Urinary incontinence 10/30/2014    Aneurysm of abdominal aorta (HCC) 04/07/2014    Centrilobular emphysema (HCC) 04/07/2014    Deep venous insufficiency 04/07/2014    Hyperlipidemia 04/07/2014    Pacemaker 04/07/2014    Chronic diastolic CHF (congestive heart failure) ([facility] ) 04/06/2014    Fibromyalgia 08/08/2013    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/2013    Fecal soiling 03/26/2013    Class 2 obesity due to excess calories without serious comorbidity with body mass index (BMI) of 36 0 to 36 9 in adult 03/26/2013    Chronic a-fib ([facility] ) 01/23/2013    Allergic rhinitis 10/01/2012    Fistula-in-ano 09/05/2012    Benign prostatic hyperplasia 09/04/2012    Carpal tunnel syndrome 09/04/2012    Moderate or severe vision impairment, one eye 09/04/2012    JOVI (obstructive sleep apnea) 09/04/2012    Venous insufficiency (chronic) (peripheral) 09/04/2012      LOS (days): 9  Geometric Mean LOS (GMLOS) (days): 6 20  Days to GMLOS:-2 7     OBJECTIVE:    Risk of Unplanned Readmission Score: 26         Current admission status: Inpatient       Preferred Pharmacy:   [place] [address] [address], [facility] [address]  Phone: [phone] Fax: [phone], [address] [place] [address]  Phone: [phone] Fax: [phone]     [facility] [address]  Phone: [phone] Fax: [phone]    Primary Care Provider: Heidi Shepherd DO    Primary Insurance: MEDICARE  Secondary Insurance: COMMERCIAL MISCELLANEOUS    ASSESSMENT:  [address] - Daughter   Primary Phone: [phone] (Mobile)                                                                DISCHARGE DETAILS:    Discharge planning discussed with[de-identified] Pt and daughter  Freedom of Choice: Yes  Comments - Freedom of Choice: Pt wishes to go to [facility] for STR  CM contacted family/caregiver?: Yes  Were Treatment Team discharge recommendations reviewed with patient/caregiver?: Yes  Did patient/caregiver verbalize understanding of patient care needs?: Yes  Were patient/caregiver advised of the risks associated with not following Treatment Team discharge recommendations?: Yes    Contacts  Patient Contacts: Daughters and pt  Relationship to Patient[de-identified] Family  Contact Method: In Person  Reason/Outcome: Discharge Planning              Other Referral/Resources/Interventions Provided:  Interventions: Short Term Rehab  Referral Comments: Pt wishes to go to [facility] as it is closer to family  Treatment Team Recommendation: Short Term Rehab  Discharge Destination Plan[de-identified] Short Term Rehab                                IMM Given (Date):: 02/07/22  IMM Given to[de-identified] Patient  Family notified[de-identified] Family in room  Additional Comments: Pt agrees with discharge and has no questions  Accepting Facility Name, [address] : [place], [place]  Receiving Facility/Agency Phone Number: [phone]  Facility/Agency Fax Number: [phone]     CM asked [facility] to order COVID 4 plex for pt for acceptance to [facility]  CM to arrange transport once COVID swab is back and if negative  Pt is COVID vaccinated x2 and boosted, booster was done at [address] [place] [address] in [place], [place] on 12/08/21, Ria Tang [id number]  CM asked for a copy of the card to be emailed to [facility]

## 2022-02-07 NOTE — PROGRESS NOTES
[facility]  Progress Note Rocky Valencia 1940, 80 y o  male MRN: [id number]  Unit/Bed#: W -01 Encounter: [id number]  Primary Care Provider: Ann Marie Jaimes DO   Date and time admitted to hospital: 1/29/2022  9:34 AM    Constipation  Assessment & Plan  - Pt reports he hasnt had a BM in 8 days  - Multimodal bowel regimen since admission without relief  - patient had bowel movement x2 on 02/05/2022    Acute blood loss anemia  Assessment & Plan  - acute blood loss anemia post-op right femur IM nail in the setting of CKD  - Pt has not required transfusion  - Appreciate nephrology consult, transfusing IV venofer for 3 doses    Fall  Assessment & Plan  - Status post mechanical fall on ice with the below noted injuries and issues  - Fall precautions  - Geriatric Medicine consultation for evaluation, medication review and recommendations   - PT and OT evaluation and treatment as indicated  - Case Management consultation for disposition planning  CAD (coronary artery disease)  Assessment & Plan  - Patient with chronic significant history of CAD  Patient follows with Dr Arnel Sanchez  - Appreciate [facility] evaluation and recommendations including discontinuing Plavix and continuing aspirin on discharge  - No evidence of acute cardiac event at this time  - EKG indicates ventricular pacing rhythm  Pacemaker was interrogated, showing 6 months of battery life left  - Continue home medication therapy as appropriate      - Follow up with Dr Arnel Sanchez outpatient    Stage 3a chronic kidney disease [facility])  Assessment & Plan  Lab Results   Component Value Date    EGFR 42 02/06/2022    EGFR 48 02/05/2022    EGFR 50 02/04/2022    CREATININE 1 51 (H) 02/06/2022    CREATININE 1 37 (H) 02/05/2022    CREATININE 1 31 (H) 02/04/2022     - Patient with chronic history of CKD stage 3 with baseline creatinine appearing to be between 1 4 and 1 5   - creatinine improved, at baseline  - appreciate Nephrology consult and recommendations  - avoid hypotension    Lumbar radiculopathy  Assessment & Plan  - Patient with chronic lumbar radiculopathy and pain in his legs  Patient reports acutely worsened pain in his calves and feet since his fall   - Neurovascular exam intact on initial evaluation   - T and L-spine x-rays indicate no fractures  - patient has been complaining of worsening lower extremity pain for the past 3 days  - Lower extremity duplex negative  - Continue multimodal analgesic regimen including gabapentin  APS consulted  - Outpatient follow-up with PCP and pain provider  Chronic a-fib ([facility])  Assessment & Plan  - Chronic history of atrial fibrillation  - continue home Xarelto  - Monitor for adequate heart rate control   - Continue home medication regimen as appropriate otherwise  - Appreciate Cardiology evaluation and recommendations   - Outpatient follow-up per team     Chronic diastolic CHF (congestive heart failure) ([facility])  Assessment & Plan  Wt Readings from Last 3 Encounters:   02/07/22 129 kg (284 lb 6 3 oz)   01/24/22 131 kg (289 lb)   01/06/22 130 kg (286 lb)     - Chronic history of diastolic CHF with evidence of acute pulmonary edema  - cardiology and Nephrology were consulted and note appreciated  - cardiology and Nephrology have signed off  - echocardiogram 1/31:  EF 65%  - SHAWNEE improved to baseline kidney function  Continue home PO diuresis  - Oxygen requirements improving, currently on room air  - Home oral diuretics restarted      Benign hypertension with chronic kidney disease, stage III [facility])  Assessment & Plan  Lab Results   Component Value Date    EGFR 42 02/06/2022    EGFR 48 02/05/2022    EGFR 50 02/04/2022    CREATININE 1 51 (H) 02/06/2022    CREATININE 1 37 (H) 02/05/2022    CREATININE 1 31 (H) 02/04/2022     - Patient with history of chronic hypertension   - Monitor blood pressures with goal systolic pressure less than 160     - Appreciate Cardiology and Nephrology evaluation and recommendations   - Outpatient follow-up with PCP per routine  Pain in both lower extremities  Assessment & Plan  · Patient complaining of calf pain  · History of lumbar radiculopathy and chronic lower extremity pain and notes that his right lower extremity pain is worse than usual   · Neurovascularly intact  · Negative Homans sign  · Bilateral lower extremity duplex negative for acute or chronic thrombus  · Chronic    Chronic pain syndrome  Assessment & Plan  - Patient with chronic pain syndrome   - Continue multimodal analgesic regimen with additional agents added/adjustments made in setting of acute pain secondary to traumatic injury  - Bowel regimen while on opioid therapy  - APS consult to assist with acute on chronic pain  - Outpatient follow-up with PCP and pain provider  * Closed fracture of right femur [facility])  Assessment & Plan  - Acute right intertrochanteric femur fracture, present on admission   - Appreciate Orthopedic surgery evaluation and recommendations   - 1/31 right femur IM nail  - Maintain WBAT RLE  - Monitor right lower extremity neurovascular exam   - Continue multimodal analgesic regimen-pain controlled  - DVT prophylaxis--Xarelto  - PT and OT evaluation and treatment as indicated  - follow-up with orthopedics as an outpatient  DVT prophylaxis: SCDs and Xarelto  PT and OT: eval and treat    Disposition:  Patient medically stable for discharge  Will follow-up with case management for placement  SUBJECTIVE:  Chief Complaint: "I am doing well "    Subjective:  Patient reports that he is ready to be discharged today  Denying any new complaints        OBJECTIVE:     Meds/Allergies     Current Facility-Administered Medications:     acetaminophen (TYLENOL) tablet 975 mg, 975 mg, Oral, Q8H [address], Emerald Ferrell MD, 975 mg at 02/07/22 0500    albuterol (PROVENTIL HFA,VENTOLIN HFA) inhaler 2 puff, 2 puff, Inhalation, Q6H PRN, Emerald Ferrell MD, 2 puff at 01/30/22 1136    allopurinol (ZYLOPRIM) tablet 200 mg, 200 mg, Oral, Daily, Masood Brantley MD, 200 mg at 02/07/22 0831    ascorbic acid (VITAMIN C) tablet 500 mg, 500 mg, Oral, Daily, Masood Brantley MD, 500 mg at 02/07/22 0831    aspirin chewable tablet 81 mg, 81 mg, Oral, Daily, NAVA Blanchard, 81 mg at 02/07/22 0831    bisacodyl (DULCOLAX) rectal suppository 10 mg, 10 mg, Rectal, Daily, NAVA Deleon, 10 mg at 02/07/22 0831    calcium carbonate-vitamin D (OSCAL-D) 500 mg-200 units per tablet 2 tablet, 2 tablet, Oral, HS, Masood Brantley MD, 2 tablet at 02/06/22 2126    cholecalciferol (VITAMIN D3) tablet 1,000 Units, 1,000 Units, Oral, Daily, Masood Brantley MD, 1,000 Units at 02/07/22 0831    cyanocobalamin (VITAMIN B-12) tablet 100 mcg, 100 mcg, Oral, Daily, Masood Brantley MD, 100 mcg at 02/07/22 0831    DULoxetine (CYMBALTA) delayed release capsule 60 mg, 60 mg, Oral, Daily, Masood Brantley MD, 60 mg at 02/07/22 0831    finasteride (PROSCAR) tablet 5 mg, 5 mg, Oral, Daily, Masood Brantley MD, 5 mg at 02/07/22 0831    fluticasone-vilanterol (BREO ELLIPTA) 200-25 MCG/INH inhaler 1 puff, 1 puff, Inhalation, Daily, Masood Brantley MD, 1 puff at 02/07/22 0837    furosemide (LASIX) tablet 20 mg, 20 mg, Oral, QPM, NAVA Barnhart, 20 mg at 02/06/22 1651    furosemide (LASIX) tablet 40 mg, 40 mg, Oral, Daily, NAVA Barnhart, 40 mg at 02/07/22 0831    glucosamine sulfate capsule 500 mg, 500 mg, Oral, BID, Masood Brantley MD, 500 mg at 02/07/22 0831    guaiFENesin (MUCINEX) 12 hr tablet 600 mg, 600 mg, Oral, Q12H [facility], NAVA Deleon, 600 mg at 02/07/22 0831    [facility]) inhalation solution 0 63 mg, 0 63 mg, Nebulization, Q8H PRN, Masood Brantley MD, 0 63 mg at 02/05/22 2119    [facility]) inhalation solution 1 25 mg, 1 25 mg, Nebulization, BID, Masood Brantley MD, 1 25 mg at 02/07/22 0719    naloxone (NARCAN) 0 04 mg/mL syringe 0 04 mg, 0 04 mg, Intravenous, Q1MIN PRN, Mayelin Contreras MD    oxyCODONE (ROXICODONE) IR tablet 2 5 mg, 2 5 mg, Oral, Q4H PRN, Mayelin Contreras MD, 2 5 mg at 02/03/22 0558    oxyCODONE (ROXICODONE) IR tablet 5 mg, 5 mg, Oral, Q4H PRN, Mayelin Contreras MD, 5 mg at 02/02/22 1011    polyethylene glycol (MIRALAX) packet 17 g, 17 g, Oral, Daily, Ted Parents, CRNP, 17 g at 02/07/22 0831    pravastatin (PRAVACHOL) tablet 40 mg, 40 mg, Oral, Daily With Gwyn Alejo MD, 40 mg at 02/06/22 1651    rivaroxaban (XARELTO) tablet 15 mg, 15 mg, Oral, HS, Ted Parents, CRNP, 15 mg at 02/06/22 2126    senna-docusate sodium (SENOKOT S) 8 6-50 mg per tablet 1 tablet, 1 tablet, Oral, BID, Mayelin Contreras MD, 1 tablet at 02/07/22 0831    sodium chloride 0 9 % inhalation solution 3 mL, 3 mL, Nebulization, BID, Mayelin Contreras MD, 3 mL at 02/07/22 0719     Vitals:   Vitals:    02/07/22 0721   BP:    Pulse:    Resp:    Temp:    SpO2: 96%       Intake/Output:  I/O       02/05 0701 02/06 0700 02/06 0701 02/07 0700 02/07 0701  02/08 0700    P  O  480 1040 580    IV Piggyback 100 100     Total Intake(mL/kg) 580 (4 5) 1140 (8 8) 580 (4 5)    Urine (mL/kg/hr) 1650 (0 5) 1865 (0 6) 260 (0 9)    Stool 0 0     Total Output 1650 1865 260    Net -1070 -725 +320           Unmeasured Urine Occurrence  1 x     Unmeasured Stool Occurrence 1 x 1 x            Nutrition/GI Proph/Bowel Reg:  Continue current diet    Physical Exam:   GENERAL APPEARANCE:  No acute distress  NEURO:  GCS 15  HEENT:  Normocephalic  CV:  Regular rate and rhythm  LUNGS:  CTA bilaterally  GI:  Nontender, nondistended  : no denney  MSK: Moving all extremities  SKIN: warm, dry, intact           Invasive Devices  Report    Peripheral Intravenous Line            Peripheral IV 02/03/22 Dorsal (posterior); Left Hand 3 days                 Lab Results: Results: I have personally reviewed pertinent reports   , BMP/CMP: No results found for: SODIUM, K, CL, CO2, ANIONGAP, BUN, CREATININE, GLUCOSE, CALCIUM, AST, ALT, ALKPHOS, PROT, BILITOT, EGFR and CBC: No results found for: WBC, HGB, HCT, MCV, PLT, ADJUSTEDWBC, MCH, MCHC, RDW, MPV, NRBC  Imaging/EKG Studies: Results: I have personally reviewed pertinent reports      Other Studies:  No other studies  VTE Prophylaxis:  SCDs and Xarelto

## 2022-02-07 NOTE — PROGRESS NOTES
Progress Note - Orthopedics   Nahed Jj 80 y o  male MRN: [id number]  Unit/Bed#: W -01      Subjective:    81 y o male was seen this morning at bedside  Denotes no pain in the right hip  Has no other complaints over the weekend regards to right lower extremity      0   Lab Value Date/Time    HCT 26 0 (L) 02/06/2022 0525    HCT 25 2 (L) 02/05/2022 0457    HCT 25 7 (L) 02/04/2022 0436    HCT 45 9 01/07/2017 0834    HCT 43 8 07/16/2016 0752    HCT 41 0 04/07/2016 1141    HGB 7 9 (L) 02/06/2022 0525    HGB 7 5 (L) 02/05/2022 0457    HGB 7 5 (L) 02/04/2022 0436    HGB 15 3 01/07/2017 0834    HGB 14 9 07/16/2016 0752    HGB 13 4 04/07/2016 1141    INR 1 57 (H) 11/21/2021 1330    WBC 12 92 (H) 02/06/2022 0525    WBC 10 37 (H) 02/05/2022 0457    WBC 9 89 02/04/2022 0436    WBC 8 40 03/20/2018 0000    WBC 11-30 (A) 06/15/2017 1023    WBC 9 9 01/07/2017 0834    WBC 9 2 07/16/2016 0752       Meds:    Current Facility-Administered Medications:     acetaminophen (TYLENOL) tablet 975 mg, 975 mg, Oral, Q8H [address], Gabby Smith MD, 975 mg at 02/07/22 0500    albuterol (PROVENTIL HFA,VENTOLIN HFA) inhaler 2 puff, 2 puff, Inhalation, Q6H PRN, Gabby Smith MD, 2 puff at 01/30/22 1136    allopurinol (ZYLOPRIM) tablet 200 mg, 200 mg, Oral, Daily, Gabby Smith MD, 200 mg at 02/07/22 0831    ascorbic acid (VITAMIN C) tablet 500 mg, 500 mg, Oral, Daily, Gabby Smith MD, 500 mg at 02/07/22 0831    aspirin chewable tablet 81 mg, 81 mg, Oral, Daily, NAVA Blanchard, 81 mg at 02/07/22 0831    bisacodyl (DULCOLAX) rectal suppository 10 mg, 10 mg, Rectal, Daily, NAVA Vanessa, 10 mg at 02/07/22 0831    calcium carbonate-vitamin D (OSCAL-D) 500 mg-200 units per tablet 2 tablet, 2 tablet, Oral, HS, Gabby Smith MD, 2 tablet at 02/06/22 2126    cholecalciferol (VITAMIN D3) tablet 1,000 Units, 1,000 Units, Oral, Daily, Gabby Smith MD, 1,000 Units at 02/07/22 0831    cyanocobalamin (VITAMIN B-12) tablet 100 mcg, 100 mcg, Oral, Daily, Halina Ty MD, 100 mcg at 02/07/22 0831    DULoxetine (CYMBALTA) delayed release capsule 60 mg, 60 mg, Oral, Daily, Halina Ty MD, 60 mg at 02/07/22 0831    finasteride (PROSCAR) tablet 5 mg, 5 mg, Oral, Daily, Halina Ty MD, 5 mg at 02/07/22 0831    fluticasone-vilanterol (BREO ELLIPTA) 200-25 MCG/INH inhaler 1 puff, 1 puff, Inhalation, Daily, Halina Ty MD, 1 puff at 02/07/22 0837    furosemide (LASIX) tablet 20 mg, 20 mg, Oral, QPM, NAVA Barnhart, 20 mg at 02/06/22 1651    furosemide (LASIX) tablet 40 mg, 40 mg, Oral, Daily, NAVA Barnhart, 40 mg at 02/07/22 0831    glucosamine sulfate capsule 500 mg, 500 mg, Oral, BID, Halina Ty MD, 500 mg at 02/07/22 0831    guaiFENesin (MUCINEX) 12 hr tablet 600 mg, 600 mg, Oral, Q12H [facility] NAVA Simental, 600 mg at 02/07/22 0831    levalbuterol (Omie Lango) inhalation solution 0 63 mg, 0 63 mg, Nebulization, Q8H PRN, Halina Ty MD, 0 63 mg at 02/05/22 2119    levalbuterol [facility]) inhalation solution 1 25 mg, 1 25 mg, Nebulization, BID, Halina Ty MD, 1 25 mg at 02/07/22 0719    naloxone (NARCAN) 0 04 mg/mL syringe 0 04 mg, 0 04 mg, Intravenous, Q1MIN PRN, Halina Ty MD    oxyCODONE (ROXICODONE) IR tablet 2 5 mg, 2 5 mg, Oral, Q4H PRN, Halina Ty MD, 2 5 mg at 02/03/22 0558    oxyCODONE (ROXICODONE) IR tablet 5 mg, 5 mg, Oral, Q4H PRN, Halina Ty MD, 5 mg at 02/02/22 1011    polyethylene glycol (MIRALAX) packet 17 g, 17 g, Oral, Daily, NAVA Bowling, 17 g at 02/07/22 0831    pravastatin (PRAVACHOL) tablet 40 mg, 40 mg, Oral, Daily With Adela Silva MD, 40 mg at 02/06/22 1651    rivaroxaban (XARELTO) tablet 15 mg, 15 mg, Oral, HS, NAVA Bowling, 15 mg at 02/06/22 2126    senna-docusate sodium (SENOKOT S) 8 6-50 mg per tablet 1 tablet, 1 tablet, Oral, BID, Halina Ty MD, 1 tablet at 02/07/22 0831    sodium chloride 0 9 % inhalation solution 3 mL, 3 mL, Nebulization, BID, Sam Bhatti MD, 3 mL at 02/07/22 0719    Blood Culture:   Lab Results   Component Value Date    BLOODCX No Growth After 5 Days  09/13/2020    BLOODCX No Growth After 5 Days  09/13/2020       Wound Culture:   No results found for: WOUNDCULT    Ins and Outs:  I/O last 24 hours: In: 1839 [P O :1620; IV Piggyback:100]  Out: 2125 [Urine:2125]          Physical:  Vitals:    02/07/22 0721   BP:    Pulse:    Resp:    Temp:    SpO2: 96%     Musculoskeletal: right Lower Extremity  · Proximal dressing is clean dry intact, distal dressing scant amount of drainage this was changed today  · No excessive soft tissue swelling  · No significant tenderness   · SILT s/s/sp/dp/t    · +fhl/ehl, +ankle dorsi/plantar flexion  ·     2+ DP pulse    Assessment:    81 y o male POD 7 s/p right short TFN      Plan:  · WBAT RLE  · Continue to monitor clinically for signs of acute blood loss anemia  · Medical mgmt per primary team  · PT/OT  · Pain control per primary service  · DVT ppx  · Dispo: ortho stable for discharge, orthopedics signing off  · Follow-up with Dr Ilsa Wahl 2 weeks from date of surgery    Sang Crockett PA-C

## 2022-02-07 NOTE — PLAN OF CARE
Problem: Potential for Falls  Goal: Patient will remain free of falls  Description: INTERVENTIONS:  - Educate patient/family on patient safety including physical limitations  - Instruct patient to call for assistance with activity   - Consult OT/PT to assist with strengthening/mobility   - Keep Call bell within reach  - Keep bed low and locked with side rails adjusted as appropriate  - Keep care items and personal belongings within reach  - Initiate and maintain comfort rounds  - Make Fall Risk Sign visible to staff  - Offer Toileting every 2 Hours, in advance of need  - Initiate/Maintain alarm  - Obtain necessary fall risk management equipment  - Apply yellow socks and bracelet for high fall risk patients  - Consider moving patient to room near nurses station  Outcome: Progressing     Problem: Prexisting or High Potential for Compromised Skin Integrity  Goal: Skin integrity is maintained or improved  Description: INTERVENTIONS:  - Identify patients at risk for skin breakdown  - Assess and monitor skin integrity  - Assess and monitor nutrition and hydration status  - Monitor labs   - Assess for incontinence   - Turn and reposition patient  - Assist with mobility/ambulation  - Relieve pressure over bony prominences  - Avoid friction and shearing  - Provide appropriate hygiene as needed including keeping skin clean and dry  - Evaluate need for skin moisturizer/barrier cream  - Collaborate with interdisciplinary team   - Patient/family teaching  - Consider wound care consult   Outcome: Progressing     Problem: MOBILITY - ADULT  Goal: Maintain or return to baseline ADL function  Description: INTERVENTIONS:  -  Assess patient's ability to carry out ADLs; assess patient's baseline for ADL function and identify physical deficits which impact ability to perform ADLs (bathing, care of mouth/teeth, toileting, grooming, dressing, etc )  - Assess/evaluate cause of self-care deficits   - Assess range of motion  - Assess patient's mobility; develop plan if impaired  - Assess patient's need for assistive devices and provide as appropriate  - Encourage maximum independence but intervene and supervise when necessary  - Involve family in performance of ADLs  - Assess for home care needs following discharge   - Consider OT consult to assist with ADL evaluation and planning for discharge  - Provide patient education as appropriate  Outcome: Progressing  Goal: Maintains/Returns to pre admission functional level  Description: INTERVENTIONS:  - Perform BMAT or MOVE assessment daily    - Set and communicate daily mobility goal to care team and patient/family/caregiver  - Collaborate with rehabilitation services on mobility goals if consulted  - Perform Range of Motion 3 times a day  - Reposition patient every 2 hours  - Dangle patient 3 times a day  - Stand patient 3 times a day  - Ambulate patient 3 times a day  - Out of bed to chair 3 times a day   - Out of bed for meals 3 times a day  - Out of bed for toileting  - Record patient progress and toleration of activity level   Outcome: Progressing     Problem: Nutrition/Hydration-ADULT  Goal: Nutrient/Hydration intake appropriate for improving, restoring or maintaining nutritional needs  Description: Monitor and assess patient's nutrition/hydration status for malnutrition  Collaborate with interdisciplinary team and initiate plan and interventions as ordered  Monitor patient's weight and dietary intake as ordered or per policy  Utilize nutrition screening tool and intervene as necessary  Determine patient's food preferences and provide high-protein, high-caloric foods as appropriate       INTERVENTIONS:  - Monitor oral intake, urinary output, labs, and treatment plans  - Assess nutrition and hydration status and recommend course of action  - Evaluate amount of meals eaten  - Assist patient with eating if necessary   - Allow adequate time for meals  - Recommend/ encourage appropriate diets, oral nutritional supplements, and vitamin/mineral supplements  - Order, calculate, and assess calorie counts as needed  - Recommend, monitor, and adjust tube feedings and TPN/PPN based on assessed needs  - Assess need for intravenous fluids  - Provide specific nutrition/hydration education as appropriate  - Include patient/family/caregiver in decisions related to nutrition  Outcome: Progressing     Problem: PAIN - ADULT  Goal: Verbalizes/displays adequate comfort level or baseline comfort level  Description: Interventions:  - Encourage patient to monitor pain and request assistance  - Assess pain using appropriate pain scale  - Administer analgesics based on type and severity of pain and evaluate response  - Implement non-pharmacological measures as appropriate and evaluate response  - Consider cultural and social influences on pain and pain management  - Notify physician/advanced practitioner if interventions unsuccessful or patient reports new pain  Outcome: Progressing     Problem: INFECTION - ADULT  Goal: Absence or prevention of progression during hospitalization  Description: INTERVENTIONS:  - Assess and monitor for signs and symptoms of infection  - Monitor lab/diagnostic results  - Monitor all insertion sites, i e  indwelling lines, tubes, and drains  - Monitor endotracheal if appropriate and nasal secretions for changes in amount and color  - [place] appropriate cooling/warming therapies per order  - Administer medications as ordered  - Instruct and encourage patient and family to use good hand hygiene technique  - Identify and instruct in appropriate isolation precautions for identified infection/condition  Outcome: Progressing     Problem: SAFETY ADULT  Goal: Patient will remain free of falls  Description: INTERVENTIONS:  - Educate patient/family on patient safety including physical limitations  - Instruct patient to call for assistance with activity   - Consult OT/PT to assist with strengthening/mobility   - Keep Call bell within reach  - Keep bed low and locked with side rails adjusted as appropriate  - Keep care items and personal belongings within reach  - Initiate and maintain comfort rounds  - Make Fall Risk Sign visible to staff  - Offer Toileting every 2 Hours, in advance of need  - Initiate/Maintain alarm  - Obtain necessary fall risk management equipment  - Apply yellow socks and bracelet for high fall risk patients  - Consider moving patient to room near nurses station  Outcome: Progressing  Goal: Maintain or return to baseline ADL function  Description: INTERVENTIONS:  -  Assess patient's ability to carry out ADLs; assess patient's baseline for ADL function and identify physical deficits which impact ability to perform ADLs (bathing, care of mouth/teeth, toileting, grooming, dressing, etc )  - Assess/evaluate cause of self-care deficits   - Assess range of motion  - Assess patient's mobility; develop plan if impaired  - Assess patient's need for assistive devices and provide as appropriate  - Encourage maximum independence but intervene and supervise when necessary  - Involve family in performance of ADLs  - Assess for home care needs following discharge   - Consider OT consult to assist with ADL evaluation and planning for discharge  - Provide patient education as appropriate  Outcome: Progressing  Goal: Maintains/Returns to pre admission functional level  Description: INTERVENTIONS:  - Perform BMAT or MOVE assessment daily    - Set and communicate daily mobility goal to care team and patient/family/caregiver  - Collaborate with rehabilitation services on mobility goals if consulted  - Perform Range of Motion 3 times a day  - Reposition patient every 2 hours    - Dangle patient 3 times a day  - Stand patient 3 times a day  - Ambulate patient 3 times a day  - Out of bed to chair 3 times a day   - Out of bed for meals 3 times a day  - Out of bed for toileting  - Record patient progress and toleration of activity level   Outcome: Progressing     Problem: DISCHARGE PLANNING  Goal: Discharge to home or other facility with appropriate resources  Description: INTERVENTIONS:  - Identify barriers to discharge w/patient and caregiver  - Arrange for needed discharge resources and transportation as appropriate  - Identify discharge learning needs (meds, wound care, etc )  - Arrange for interpretive services to assist at discharge as needed  - Refer to Case Management Department for coordinating discharge planning if the patient needs post-hospital services based on physician/advanced practitioner order or complex needs related to functional status, cognitive ability, or social support system  Outcome: Progressing     Problem: Knowledge Deficit  Goal: Patient/family/caregiver demonstrates understanding of disease process, treatment plan, medications, and discharge instructions  Description: Complete learning assessment and assess knowledge base    Interventions:  - Provide teaching at level of understanding  - Provide teaching via preferred learning methods  Outcome: Progressing

## 2022-02-07 NOTE — PLAN OF CARE
Problem: Potential for Falls  Goal: Patient will remain free of falls  Description: INTERVENTIONS:  - Educate patient/family on patient safety including physical limitations  - Instruct patient to call for assistance with activity   - Consult OT/PT to assist with strengthening/mobility   - Keep Call bell within reach  - Keep bed low and locked with side rails adjusted as appropriate  - Keep care items and personal belongings within reach  - Initiate and maintain comfort rounds  - Make Fall Risk Sign visible to staff  - Offer Toileting every 2 Hours, in advance of need  - Initiate/Maintain alarm  - Obtain necessary fall risk management equipment  - Apply yellow socks and bracelet for high fall risk patients  - Consider moving patient to room near nurses station  2/7/2022 1330 by Trinidad Cabot, RN  Outcome: Adequate for Discharge  2/7/2022 1119 by Trinidad Cabot, RN  Outcome: Progressing     Problem: Prexisting or High Potential for Compromised Skin Integrity  Goal: Skin integrity is maintained or improved  Description: INTERVENTIONS:  - Identify patients at risk for skin breakdown  - Assess and monitor skin integrity  - Assess and monitor nutrition and hydration status  - Monitor labs   - Assess for incontinence   - Turn and reposition patient  - Assist with mobility/ambulation  - Relieve pressure over bony prominences  - Avoid friction and shearing  - Provide appropriate hygiene as needed including keeping skin clean and dry  - Evaluate need for skin moisturizer/barrier cream  - Collaborate with interdisciplinary team   - Patient/family teaching  - Consider wound care consult   2/7/2022 1330 by Trinidad Cabot, RN  Outcome: Adequate for Discharge  2/7/2022 1119 by Trinidad Cabot, RN  Outcome: Progressing     Problem: MOBILITY - ADULT  Goal: Maintain or return to baseline ADL function  Description: INTERVENTIONS:  -  Assess patient's ability to carry out ADLs; assess patient's baseline for ADL function and identify physical deficits which impact ability to perform ADLs (bathing, care of mouth/teeth, toileting, grooming, dressing, etc )  - Assess/evaluate cause of self-care deficits   - Assess range of motion  - Assess patient's mobility; develop plan if impaired  - Assess patient's need for assistive devices and provide as appropriate  - Encourage maximum independence but intervene and supervise when necessary  - Involve family in performance of ADLs  - Assess for home care needs following discharge   - Consider OT consult to assist with ADL evaluation and planning for discharge  - Provide patient education as appropriate  2/7/2022 1330 by Bobbi Kumar RN  Outcome: Adequate for Discharge  2/7/2022 1119 by Bobbi Kumar RN  Outcome: Progressing  Goal: Maintains/Returns to pre admission functional level  Description: INTERVENTIONS:  - Perform BMAT or MOVE assessment daily    - Set and communicate daily mobility goal to care team and patient/family/caregiver  - Collaborate with rehabilitation services on mobility goals if consulted  - Perform Range of Motion 3 times a day  - Reposition patient every 2 hours  - Dangle patient 3 times a day  - Stand patient 3 times a day  - Ambulate patient 3 times a day  - Out of bed to chair 3 times a day   - Out of bed for meals 3 times a day  - Out of bed for toileting  - Record patient progress and toleration of activity level   2/7/2022 1330 by Bobbi Kumar RN  Outcome: Adequate for Discharge  2/7/2022 1119 by Bobbi Kumar RN  Outcome: Progressing     Problem: Nutrition/Hydration-ADULT  Goal: Nutrient/Hydration intake appropriate for improving, restoring or maintaining nutritional needs  Description: Monitor and assess patient's nutrition/hydration status for malnutrition  Collaborate with interdisciplinary team and initiate plan and interventions as ordered  Monitor patient's weight and dietary intake as ordered or per policy   Utilize nutrition screening tool and intervene as necessary  Determine patient's food preferences and provide high-protein, high-caloric foods as appropriate       INTERVENTIONS:  - Monitor oral intake, urinary output, labs, and treatment plans  - Assess nutrition and hydration status and recommend course of action  - Evaluate amount of meals eaten  - Assist patient with eating if necessary   - Allow adequate time for meals  - Recommend/ encourage appropriate diets, oral nutritional supplements, and vitamin/mineral supplements  - Order, calculate, and assess calorie counts as needed  - Recommend, monitor, and adjust tube feedings and TPN/PPN based on assessed needs  - Assess need for intravenous fluids  - Provide specific nutrition/hydration education as appropriate  - Include patient/family/caregiver in decisions related to nutrition  2/7/2022 1330 by Reanna Harper RN  Outcome: Adequate for Discharge  2/7/2022 1119 by Reanna Harper RN  Outcome: Progressing     Problem: PAIN - ADULT  Goal: Verbalizes/displays adequate comfort level or baseline comfort level  Description: Interventions:  - Encourage patient to monitor pain and request assistance  - Assess pain using appropriate pain scale  - Administer analgesics based on type and severity of pain and evaluate response  - Implement non-pharmacological measures as appropriate and evaluate response  - Consider cultural and social influences on pain and pain management  - Notify physician/advanced practitioner if interventions unsuccessful or patient reports new pain  2/7/2022 1330 by Reanna Harper RN  Outcome: Adequate for Discharge  2/7/2022 1119 by Reanna Harper RN  Outcome: Progressing     Problem: INFECTION - ADULT  Goal: Absence or prevention of progression during hospitalization  Description: INTERVENTIONS:  - Assess and monitor for signs and symptoms of infection  - Monitor lab/diagnostic results  - Monitor all insertion sites, i e  indwelling lines, tubes, and drains  - Monitor endotracheal if appropriate and nasal secretions for changes in amount and color  - [place] appropriate cooling/warming therapies per order  - Administer medications as ordered  - Instruct and encourage patient and family to use good hand hygiene technique  - Identify and instruct in appropriate isolation precautions for identified infection/condition  2/7/2022 1330 by Dulce Rick RN  Outcome: Adequate for Discharge  2/7/2022 1119 by Dulce Rick RN  Outcome: Progressing     Problem: SAFETY ADULT  Goal: Patient will remain free of falls  Description: INTERVENTIONS:  - Educate patient/family on patient safety including physical limitations  - Instruct patient to call for assistance with activity   - Consult OT/PT to assist with strengthening/mobility   - Keep Call bell within reach  - Keep bed low and locked with side rails adjusted as appropriate  - Keep care items and personal belongings within reach  - Initiate and maintain comfort rounds  - Make Fall Risk Sign visible to staff  - Offer Toileting every 2 Hours, in advance of need  - Initiate/Maintain alarm  - Obtain necessary fall risk management equipment  - Apply yellow socks and bracelet for high fall risk patients  - Consider moving patient to room near nurses station  2/7/2022 1330 by Dulce Rick RN  Outcome: Adequate for Discharge  2/7/2022 1119 by Dulce Rick RN  Outcome: Progressing  Goal: Maintain or return to baseline ADL function  Description: INTERVENTIONS:  -  Assess patient's ability to carry out ADLs; assess patient's baseline for ADL function and identify physical deficits which impact ability to perform ADLs (bathing, care of mouth/teeth, toileting, grooming, dressing, etc )  - Assess/evaluate cause of self-care deficits   - Assess range of motion  - Assess patient's mobility; develop plan if impaired  - Assess patient's need for assistive devices and provide as appropriate  - Encourage maximum independence but intervene and supervise when necessary  - Involve family in performance of ADLs  - Assess for home care needs following discharge   - Consider OT consult to assist with ADL evaluation and planning for discharge  - Provide patient education as appropriate  2/7/2022 1330 by Keke Amezcua RN  Outcome: Adequate for Discharge  2/7/2022 1119 by Keke Amezcua RN  Outcome: Progressing  Goal: Maintains/Returns to pre admission functional level  Description: INTERVENTIONS:  - Perform BMAT or MOVE assessment daily    - Set and communicate daily mobility goal to care team and patient/family/caregiver  - Collaborate with rehabilitation services on mobility goals if consulted  - Perform Range of Motion 3 times a day  - Reposition patient every 2 hours    - Dangle patient 3 times a day  - Stand patient 3 times a day  - Ambulate patient 3 times a day  - Out of bed to chair 3 times a day   - Out of bed for meals 3 times a day  - Out of bed for toileting  - Record patient progress and toleration of activity level   2/7/2022 1330 by Keke Amezcua RN  Outcome: Adequate for Discharge  2/7/2022 1119 by Keke Amezcua RN  Outcome: Progressing     Problem: DISCHARGE PLANNING  Goal: Discharge to home or other facility with appropriate resources  Description: INTERVENTIONS:  - Identify barriers to discharge w/patient and caregiver  - Arrange for needed discharge resources and transportation as appropriate  - Identify discharge learning needs (meds, wound care, etc )  - Arrange for interpretive services to assist at discharge as needed  - Refer to Case Management Department for coordinating discharge planning if the patient needs post-hospital services based on physician/advanced practitioner order or complex needs related to functional status, cognitive ability, or social support system  2/7/2022 1330 by Keke Amezcua RN  Outcome: Adequate for Discharge  2/7/2022 1119 by Keke Amezcua RN  Outcome: Progressing     Problem: Knowledge Deficit  Goal: Patient/family/caregiver demonstrates understanding of disease process, treatment plan, medications, and discharge instructions  Description: Complete learning assessment and assess knowledge base    Interventions:  - Provide teaching at level of understanding  - Provide teaching via preferred learning methods  2/7/2022 1330 by Tom Menjivar RN  Outcome: Adequate for Discharge  2/7/2022 1119 by Tom Menjivar RN  Outcome: Progressing

## 2022-02-07 NOTE — ASSESSMENT & PLAN NOTE
- Patient with chronic significant history of CAD  Patient follows with Dr Kenia Denny  - Appreciate [facility] evaluation and recommendations including discontinuing Plavix and continuing aspirin on discharge  - No evidence of acute cardiac event at this time  - EKG indicates ventricular pacing rhythm  Pacemaker was interrogated, showing 6 months of battery life left  - Continue home medication therapy as appropriate      - Follow up with Dr Kenia Denny outpatient

## 2022-02-07 NOTE — CASE MANAGEMENT
Case Management Assessment & Discharge Planning Note    Patient name Solomon Friend  Location W /W -16 MRN [id number]  : 1940 Date 2022       Current Admission Date: 2022  Current Admission Diagnosis:Closed fracture of right femur [facility])   Patient Active Problem List    Diagnosis Date Noted    Acute blood loss anemia 2022    Constipation 2022    Fall 2022    Closed fracture of right femur ([facility] [facility] ) 2022    Wheezing 2021    Skin tear of right upper arm without complication     Contusion, buttock, initial encounter 2021    COPD (chronic obstructive pulmonary disease) ([facility] [address] [facility] ) 2021    Acute on chronic diastolic heart failure ([facility] [facility] ) 2021    Memory difficulty 2021    Neuropathy 2021    Coronary artery arteriosclerosis 2021    Essential hypertension 2021    Dyspnea on exertion     Idiopathic hematuria 2021    Kidney stone on left side 2021    Flank pain 2021    Obesity (BMI 30-39 9) 2021    Chronic constipation 2021    Medicare annual wellness visit, subsequent 2021    Vitamin D insufficiency 2020    Anemia 2020    Former smoker 2020    Nephrolithiasis 10/30/2019    Bilateral leg edema 2019    Hyperuricemia 2019    Mixed simple and mucopurulent chronic bronchitis ([address] ) 2019    CAD (coronary artery disease) 2019    Status post primary angioplasty with coronary stent 2019    Stage 3a chronic kidney disease ([address] ) 2019    Serum total bilirubin elevated 2019    Peripheral arteriosclerosis ([address] ) 2019    Lumbar radiculopathy 2019    Pain in both lower extremities 2018    Benign hypertension with chronic kidney disease, stage III ([address] ) 2017    JOO (generalized anxiety disorder) 2016    Chronic pain syndrome 2016    Bilateral lumbar radiculopathy 2016  Lumbar canal stenosis 03/17/2016    Difficulty in walking 09/11/2015    Urinary incontinence 10/30/2014    Aneurysm of abdominal aorta (HCC) 04/07/2014    Centrilobular emphysema (HCC) 04/07/2014    Deep venous insufficiency 04/07/2014    Hyperlipidemia 04/07/2014    Pacemaker 04/07/2014    Chronic diastolic CHF (congestive heart failure) ([facility] [address] ) 04/06/2014    Fibromyalgia 08/08/2013    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/2013    Fecal soiling 03/26/2013    Class 2 obesity due to excess calories without serious comorbidity with body mass index (BMI) of 36 0 to 36 9 in adult 03/26/2013    Chronic a-fib ([facility] [address] [facility] ) 01/23/2013    Allergic rhinitis 10/01/2012    Fistula-in[place] 09/05/2012    Benign prostatic hyperplasia 09/04/2012    Carpal tunnel syndrome 09/04/2012    Moderate or severe vision impairment, one eye 09/04/2012    JOVI (obstructive sleep apnea) 09/04/2012    Venous insufficiency (chronic) (peripheral) 09/04/2012      LOS (days): 9  Geometric Mean LOS (GMLOS) (days): 6 20  Days to GMLOS:-2 9     OBJECTIVE:    Risk of Unplanned Readmission Score: 25         Current admission status: Inpatient       Preferred Pharmacy:   [place] [address] [address] [place] [address] [id number]  Phone: [phone] Fax: [phone], [address] [address]  Phone: [phone] Fax: [phone]    [facility] [address]  Phone: [phone] Fax: [phone]    Primary Care Provider: Jayla Sosa DO    Primary Insurance: MEDICARE  Secondary Insurance: COMMERCIAL MISCELLANEOUS    ASSESSMENT:  [address] - Daughter   Primary Phone: [phone] (Mobile)                                                                DISCHARGE DETAILS:                                                          Transport at Discharge : [facility]  Dispatcher Contacted: Yes  Number/Name of Dispatcher: Augusto Marcano  Transported by [employer] and Unit #): [facility]TS  ETA of Transport (Date): 02/07/22  ETA of Transport (Time): 1600                                          CM notified facility, family, PA-C, and RN of confirmed transport time, [facility] to [address]

## 2022-02-08 ENCOUNTER — TRANSITIONAL CARE MANAGEMENT (OUTPATIENT)
Dept: FAMILY MEDICINE CLINIC | Facility: CLINIC | Age: 82
End: 2022-02-08

## 2022-02-08 NOTE — PROGRESS NOTES
[facility]  [address] [place] [address], [address] [place] [address]    Nursing Home Admission    NAME: Jayce Stephen  AGE: 80 y o  SEX: male [id number]      Patient Location     [facility]    Patients care was coordinated with nursing facility staff  Recent vitals, labs and updated medications were reviewed on [employer] of facility  Past Medical, surgical, social, medication and allergy history and patients previous records reviewed  Assessment/Plan:    Closed fracture of right femur ([address] )  History of recent fall resulting in right femur fracture status post surgical repair  Continue Xarelto  Continue PT OT  Follow-up with orthopedic service  Patient remains on Tylenol and oxycodone p r n  for pain    Neuropathy  Continue duloxetine    Acute on chronic renal failure:  Baseline creatinine is between 1 3-1 5  Recent creatinine was 1 5  Patient was followed by Nephrology service  Maintenance diuretics were resumed upon discharge  Follow-up repeat BMP  Avoid hypotension and nephrotoxins      Severe AS:  Last reported FLY was 0 91 cmsq  Continue maintenance diuretics 40 mg q a m  and 20 mg q p m  follow-up with cardiology service    Chronic atrial fibrillation:  Heart rate stable without any rate lowering medication  Continue oxycodone    Hyperlipidemia:  Continue simvastatin    COPD:  Continue Trelegy and albuterol inhaler  Stable no bronchospasm    History of gout:  Continue allopurinol    CAD status post PCI/LEOBARDO to proximal RCA and mid LAD :  Patient underwent PCI in May/2019  Cardiac status remains stable  No angina symptoms    Continue aspirin statin    Anemia:  Recent hemoglobin was 7 5 follow-up repeat CBC    Hypertension:  Blood pressure stable patient is currently not on any antihypertensives except for furosemide    BPH:  Continue finasteride    Vitamin-D deficiency:  Continue vitamin-D supplement      Vitamin B12 deficiency:  Continue vitamin B12 supplement    Chief Complaint     Recent fall, right femur fracture status post repair    HPI       Patient is a 80 y o  male with past medical history significant for heart, AFib, essential hypertension, anemia, CAD, CKD 3, COPD, hyperlipidemia and BPH  Patient was recently hospitalized after sustaining a fall  Per records patient  slipped and fell out of his vehicle  He was noted to have right femur fracture which required surgical repair by orthopedic service  Postop course was uneventful  Patient was seen in consultation by cardiology and nephrology services for perioperative care considering multiple comorbidities  Patient was aggressively diuresed  Renal function was closely monitored  Aspirin and Xarelto were held for a short time subsequently resumed  Patient had issues with elevated creatinine needing gentle hydration   Patient was later seen by PT OT services and discharged to [facility] where he is being seen for post hospital admission  At the time of my evaluation pt complaints of chronic pain in his legs  Pt additionally complaints of dry mouth, requesting to ease the fluid restriction  Currently on 1200 cc pr day       Past Medical History:   Diagnosis Date    Arthritis     [facility] flutter [facility]     Chronic kidney disease     stage 3    Coronary artery disease     2 stents    Fluid retention     Gout     Heart failure ([facility] )     pacemaker    Hypertension     Hyponatremia 4/8/2019    Pacemaker     Pulmonary emphysema ([facility] )     Radiculopathy     last assessed 1/28/16     Shortness of breath     exertion    Sleep apnea     c pap       Past Surgical History:   Procedure Laterality Date    ANGIOPLASTY      x2 2 stents and then replaced   [address]      pacemaker permanent placement dual chamber / last assessed 4/7/14 / implantation     CARDIAC SURGERY      pacemaker    CHOLECYSTECTOMY      CORONARY ANGIOPLASTY WITH STENT PLACEMENT      EPIDURAL BLOCK INJECTION N/A 2016    Procedure: BLOCK / INJECTION EPIDURAL STEROID LUMBAR  L4-5;  Surgeon: Lito Thomas MD;  Location: [facility] MAIN OR;  Service:     EPIDURAL BLOCK INJECTION N/A 2019    Procedure: L4 L5 Lumbar Epidural Steroid Injection;  Surgeon: Lito Thomas MD;  Location: [facility] MAIN OR;  Service: Pain Management     EYE SURGERY      cataract left    KNEE ARTHROSCOPY W/ MENISCAL REPAIR Left     LUMBAR EPIDURAL INJECTION N/A 3/17/2016    Procedure: BLOCK / INJECTION LUMBAR  L4-5  (C-ARM); Surgeon: Lito Thomas MD;  Location: [facility] MAIN OR;  Service:     [place] OPEN RX FEMUR FX+INTRAMED ELLA Right 2022    Procedure: INSERTION NAIL IM FEMUR ANTEGRADE (TROCHANTERIC); Surgeon: Manda Glez MD;  [facility];  Service: Orthopedics       Social History     Tobacco Use   Smoking Status Former Smoker    Packs/day: 1 00    Years: 50 00    Pack years: 50 00    Types: Cigarettes    Quit date: 3/27/2019    Years since quittin 8   Smokeless Tobacco Never Used   Tobacco Comment    quit 2021          Family History   Problem Relation Age of Onset    Cancer Mother 80    Heart disease Mother     Hypertension Mother     Heart disease Father     Diabetes Neg Hx     Stroke Neg Hx         No Known Allergies       Current Outpatient Medications:     acetaminophen (TYLENOL) 650 mg CR tablet, Take 1 tablet (650 mg total) by mouth every 8 (eight) hours as needed for mild pain, Disp: 30 tablet, Rfl: 0    albuterol (Ventolin HFA) 90 mcg/act inhaler, Inhale 2 puffs every 6 (six) hours as needed for wheezing, Disp: 3 Inhaler, Rfl: 3    allopurinol (ZYLOPRIM) 100 mg tablet, Take 2 tablets (200 mg total) by mouth daily, Disp: 180 tablet, Rfl: 3    ascorbic acid (VITAMIN C) 500 mg tablet, Take 500 mg by mouth daily  , Disp: , Rfl:     aspirin 81 mg chewable tablet, Chew 1 tablet (81 mg total) daily, Disp: , Rfl: 0    B Complex Vitamins (B COMPLEX 1 PO), Take 1 tablet by mouth daily , Disp: , Rfl:     Biotin (BIOTIN 5000) 5 MG CAPS, Take 1,000 mcg by mouth daily  , Disp: , Rfl:     calcium carbonate-vitamin D (OSCAL-D) 500 mg-200 units per tablet, Take 2 tablets by mouth daily at bedtime  , Disp: , Rfl:     cholecalciferol (VITAMIN D3) 1,000 units tablet, Take 1,000 Units by mouth daily, Disp: , Rfl:     Cranberry 1000 MG CAPS, Take 1 tablet by mouth 2 (two) times a day , Disp: , Rfl:     cyanocobalamin 1000 MCG tablet, Take 100 mcg by mouth daily, Disp: , Rfl:     dextromethorphan-guaiFENesin (ROBITUSSIN DM)  mg/5 mL syrup, Take 10 mL by mouth every 4 (four) hours as needed for cough, Disp: 118 mL, Rfl: 0    docusate sodium (COLACE) 100 mg capsule, Take 1 capsule (100 mg total) by mouth 2 (two) times a day, Disp: 10 capsule, Rfl: 0    DULoxetine (CYMBALTA) 60 mg delayed release capsule, Take 1 capsule (60 mg total) by mouth daily, Disp: 90 capsule, Rfl: 1    finasteride (PROSCAR) 5 mg tablet, Take 1 tablet (5 mg total) by mouth daily, Disp: 90 tablet, Rfl: 3    Flaxseed, Linseed, (EQL FLAX SEED OIL) 1000 MG CAPS, Take by mouth daily  , Disp: , Rfl:     fluticasone-umeclidinium-vilanterol (Trelegy Ellipta) 200-62 5-25 MCG/INH AEPB inhaler, Inhale 1 puff daily Rinse mouth after use , Disp: 180 blister, Rfl: 3    furosemide (LASIX) 20 mg tablet, Take 1 tablet (20 mg total) by mouth every evening, Disp: , Rfl: 0    furosemide (LASIX) 40 mg tablet, Take 1 tablet (40 mg total) by mouth daily, Disp: , Rfl: 0    oxyCODONE (ROXICODONE) 5 immediate release tablet, Take 1 tablets every 6 hrs as needed for pain control, Disp: 30 tablet, Rfl: 0    rivaroxaban (XARELTO) 15 mg tablet, Take 1 tablet (15 mg total) by mouth daily with breakfast (Patient taking differently: Take 20 mg by mouth daily at bedtime ), Disp: , Rfl:     simvastatin (ZOCOR) 20 mg tablet, Take 1 tablet (20 mg total) by mouth daily at bedtime, Disp: 90 tablet, Rfl: 1    [facility]  Vitals:    02/09/22 1716   BP: 151/84   Pulse: 82   Resp: 18   Temp: 97 6 °F (36 4 °C)   SpO2: 96%       Vital signs were reviewed in point click care    Review of Systems   Constitutional: Positive for fatigue  Negative for chills and fever  HENT: Negative for nosebleeds and rhinorrhea  Eyes: Negative for discharge and redness  Respiratory: Negative for cough, chest tightness, shortness of breath, wheezing and stridor  Cardiovascular: Negative for chest pain and leg swelling  Gastrointestinal: Negative for abdominal distention, abdominal pain, diarrhea and vomiting  Genitourinary: Negative for dysuria, flank pain and hematuria  Musculoskeletal: Positive for arthralgias (Chronic pain in bilateral legs) and back pain  Negative for gait problem  Skin: Negative for pallor  Neurological: Positive for weakness (Generalized)  Negative for tremors, seizures, syncope and headaches  Psychiatric/Behavioral: Negative for agitation, behavioral problems and confusion  Physical Exam  Constitutional:       General: He is not in acute distress  Appearance: He is well-developed  He is not diaphoretic  HENT:      Head: Normocephalic and atraumatic  Nose: No rhinorrhea  Eyes:      General: No scleral icterus  Right eye: No discharge  Left eye: No discharge  Cardiovascular:      Rate and Rhythm: Normal rate and regular rhythm  Pulmonary:      Breath sounds: No stridor  No wheezing  Abdominal:      General: There is no distension  Palpations: Abdomen is soft  Tenderness: There is no abdominal tenderness  There is no guarding  Musculoskeletal:      Cervical back: Neck supple  Comments: Trace edema involving bilateral lower extremities   Skin:     General: Skin is dry  Coloration: Skin is not pale  Findings: Bruising (Ecchymosis involving UE and left ha) present        Comments: Chronic discoloration involving bilateral lower extremity   Neurological:      General: No focal deficit present  Mental Status: He is alert  Cranial Nerves: No cranial nerve deficit  Psychiatric:         Mood and Affect: Mood normal          Behavior: Behavior normal            Diagnostic Data       Recent labs and imaging studies were reviewed  Lab Results   Component Value Date    SODIUM 137 02/06/2022    K 3 6 02/06/2022     02/06/2022    CO2 29 02/06/2022    BUN 49 (H) 02/06/2022    CREATININE 1 51 (H) 02/06/2022    GLUC 134 02/06/2022    CALCIUM 9 4 02/06/2022     Lab Results   Component Value Date    WBC 12 92 (H) 02/06/2022    HGB 7 9 (L) 02/06/2022    HCT 26 0 (L) 02/06/2022    MCV 86 02/06/2022     02/06/2022        Code Status:      Full code    Additional notes:      Continue current treatment  Patient was discharged from the hospital with fluid restriction of 1200 cc  We change to 1500 cc in view of recent SHAWNEE    F/U BMP  This note was electronically signed by Dr Corinna Estrada

## 2022-02-09 ENCOUNTER — NURSING HOME VISIT (OUTPATIENT)
Dept: WOUND CARE | Facility: HOSPITAL | Age: 82
End: 2022-02-09
Payer: MEDICARE

## 2022-02-09 ENCOUNTER — NURSING HOME VISIT (OUTPATIENT)
Dept: PULMONOLOGY | Facility: SKILLED NURSING FACILITY | Age: 82
End: 2022-02-09
Payer: MEDICARE

## 2022-02-09 ENCOUNTER — NURSING HOME VISIT (OUTPATIENT)
Dept: GERIATRICS | Facility: OTHER | Age: 82
End: 2022-02-09
Payer: MEDICARE

## 2022-02-09 VITALS
WEIGHT: 276.6 LBS | TEMPERATURE: 97.6 F | RESPIRATION RATE: 18 BRPM | SYSTOLIC BLOOD PRESSURE: 151 MMHG | DIASTOLIC BLOOD PRESSURE: 84 MMHG | BODY MASS INDEX: 36.49 KG/M2 | HEART RATE: 82 BPM | OXYGEN SATURATION: 96 %

## 2022-02-09 DIAGNOSIS — I48.20 CHRONIC A-FIB (HCC): ICD-10-CM

## 2022-02-09 DIAGNOSIS — L89.152 PRESSURE INJURY OF SACRAL REGION, STAGE 2 (HCC): Primary | ICD-10-CM

## 2022-02-09 DIAGNOSIS — J44.9 CHRONIC OBSTRUCTIVE PULMONARY DISEASE, UNSPECIFIED COPD TYPE (HCC): ICD-10-CM

## 2022-02-09 DIAGNOSIS — N18.31 STAGE 3A CHRONIC KIDNEY DISEASE (HCC): ICD-10-CM

## 2022-02-09 DIAGNOSIS — D64.9 ANEMIA, UNSPECIFIED TYPE: ICD-10-CM

## 2022-02-09 DIAGNOSIS — I50.32 CHRONIC DIASTOLIC CHF (CONGESTIVE HEART FAILURE) (HCC): ICD-10-CM

## 2022-02-09 DIAGNOSIS — R26.2 AMBULATORY DYSFUNCTION: ICD-10-CM

## 2022-02-09 DIAGNOSIS — S72.91XD CLOSED FRACTURE OF RIGHT FEMUR WITH ROUTINE HEALING, UNSPECIFIED FRACTURE MORPHOLOGY, UNSPECIFIED PORTION OF FEMUR, SUBSEQUENT ENCOUNTER: Primary | ICD-10-CM

## 2022-02-09 DIAGNOSIS — G47.33 OSA (OBSTRUCTIVE SLEEP APNEA): Primary | ICD-10-CM

## 2022-02-09 DIAGNOSIS — I25.10 CORONARY ARTERY DISEASE INVOLVING NATIVE CORONARY ARTERY OF NATIVE HEART WITHOUT ANGINA PECTORIS: Chronic | ICD-10-CM

## 2022-02-09 DIAGNOSIS — E66.9 OBESITY (BMI 30-39.9): ICD-10-CM

## 2022-02-09 DIAGNOSIS — I38 VALVULAR HEART DISEASE: ICD-10-CM

## 2022-02-09 PROCEDURE — 99305 1ST NF CARE MODERATE MDM 35: CPT | Performed by: INTERNAL MEDICINE

## 2022-02-09 PROCEDURE — 99305 1ST NF CARE MODERATE MDM 35: CPT | Performed by: NURSE PRACTITIONER

## 2022-02-09 PROCEDURE — 99306 1ST NF CARE HIGH MDM 50: CPT | Performed by: INTERNAL MEDICINE

## 2022-02-09 NOTE — ASSESSMENT & PLAN NOTE
History of recent fall resulting in right femur fracture status post surgical repair  Continue Xarelto  Continue PT OT  Follow-up with orthopedic service    Patient remains on Tylenol and oxycodone p r n  for pain

## 2022-02-09 NOTE — PROGRESS NOTES
Consultation - Pulmonary Medicine   Mariana Peraza 80 y o  male MRN: [id number]    Physician Requesting Consult:  [facility] rehab  Reason for Consult:  COPD    COPD (chronic obstructive pulmonary disease) ([address] )  Continue Trelegy, will restart albuterol nebulizer therapy b i d     JOVI (obstructive sleep apnea)  Continue q h s  BiPAP    Chronic diastolic CHF (congestive heart failure) ([facility])  Wt Readings from Last 3 Encounters:   02/07/22 129 kg (284 lb 6 3 oz)   01/24/22 131 kg (289 lb)   01/06/22 130 kg (286 lb)     Currently compensated, continue diuretics and follow with Cardiology        Valvular heart disease  Significant aortic and mitral valve disease, continue to follow with Cardiology    Chronic a-fib ([facility] )  Controlled currently, continue Xarelto    CAD (coronary artery disease)  Stable, denies chest pain, continue statin and aspirin and follow with Cardiology        ______________________________________________________________________    HPI:    Mariana Peraza is a 80 y o  male who presents to rehab after recent hospitalization with a fall leading to right femur fracture requiring surgical repair  Patient has underlying COPD on Trelegy, currently feels at baseline  He has multiple other medical conditions including aortic stenosis, CAD status post PCI/stents, chronic AFib on anticoagulation with Xarelto, chronic diastolic CHF on Lasix  Also he has severe obstructive sleep apnea on BiPAP  Patient feels much better, he has mild dyspnea on exertion/shortness of breath, denies significant cough currently, denies dysphagia, denies chest pain or fever or chills or night sweats  Patient tolerates BiPAP q h s  without complaints      Review of Systems:  Review of Systems   Constitutional: Negative  HENT: Negative  Eyes: Negative  Respiratory: Positive for shortness of breath  Cardiovascular: Negative  Gastrointestinal: Negative  Endocrine: Negative  Genitourinary: Negative  Musculoskeletal: Negative  Skin: Negative  Allergic/Immunologic: Negative  Neurological: Negative  Hematological: Negative  Psychiatric/Behavioral: Negative  Aside from what is mentioned in the HPI, the review of systems otherwise negative  Current Medications:    Current Outpatient Medications:     acetaminophen (TYLENOL) 650 mg CR tablet, Take 1 tablet (650 mg total) by mouth every 8 (eight) hours as needed for mild pain, Disp: 30 tablet, Rfl: 0    albuterol (Ventolin HFA) 90 mcg/act inhaler, Inhale 2 puffs every 6 (six) hours as needed for wheezing, Disp: 3 Inhaler, Rfl: 3    allopurinol (ZYLOPRIM) 100 mg tablet, Take 2 tablets (200 mg total) by mouth daily, Disp: 180 tablet, Rfl: 3    ascorbic acid (VITAMIN C) 500 mg tablet, Take 500 mg by mouth daily  , Disp: , Rfl:     aspirin 81 mg chewable tablet, Chew 1 tablet (81 mg total) daily, Disp: , Rfl: 0    B Complex Vitamins (B COMPLEX 1 PO), Take 1 tablet by mouth daily  , Disp: , Rfl:     Biotin (BIOTIN 5000) 5 MG CAPS, Take 1,000 mcg by mouth daily  , Disp: , Rfl:     calcium carbonate-vitamin D (OSCAL-D) 500 mg-200 units per tablet, Take 2 tablets by mouth daily at bedtime  , Disp: , Rfl:     cholecalciferol (VITAMIN D3) 1,000 units tablet, Take 1,000 Units by mouth daily, Disp: , Rfl:     Cranberry 1000 MG CAPS, Take 1 tablet by mouth 2 (two) times a day , Disp: , Rfl:     cyanocobalamin 1000 MCG tablet, Take 100 mcg by mouth daily, Disp: , Rfl:     dextromethorphan-guaiFENesin (ROBITUSSIN DM)  mg/5 mL syrup, Take 10 mL by mouth every 4 (four) hours as needed for cough, Disp: 118 mL, Rfl: 0    docusate sodium (COLACE) 100 mg capsule, Take 1 capsule (100 mg total) by mouth 2 (two) times a day, Disp: 10 capsule, Rfl: 0    DULoxetine (CYMBALTA) 60 mg delayed release capsule, Take 1 capsule (60 mg total) by mouth daily, Disp: 90 capsule, Rfl: 1    finasteride (PROSCAR) 5 mg tablet, Take 1 tablet (5 mg total) by mouth daily, Disp: 90 tablet, Rfl: 3    Flaxseed, Linseed, (EQL FLAX SEED OIL) 1000 MG CAPS, Take by mouth daily  , Disp: , Rfl:     fluticasone-umeclidinium-vilanterol (Trelegy Ellipta) 200-62 5-25 MCG/INH AEPB inhaler, Inhale 1 puff daily Rinse mouth after use , Disp: 180 blister, Rfl: 3    furosemide (LASIX) 20 mg tablet, Take 1 tablet (20 mg total) by mouth every evening, Disp: , Rfl: 0    furosemide (LASIX) 40 mg tablet, Take 1 tablet (40 mg total) by mouth daily, Disp: , Rfl: 0    oxyCODONE (ROXICODONE) 5 immediate release tablet, Take 1 tablets every 6 hrs as needed for pain control, Disp: 30 tablet, Rfl: 0    rivaroxaban (XARELTO) 15 mg tablet, Take 1 tablet (15 mg total) by mouth daily with breakfast (Patient taking differently: Take 20 mg by mouth daily at bedtime ), Disp: , Rfl:     simvastatin (ZOCOR) 20 mg tablet, Take 1 tablet (20 mg total) by mouth daily at bedtime, Disp: 90 tablet, Rfl: 1    Historical Information   Past Medical History:   Diagnosis Date    Arthritis     Atrial flutter (HCC)     Chronic kidney disease     stage 3    Coronary artery disease     2 stents    Fluid retention     Gout     Heart failure (HCC)     pacemaker    Hypertension     Hyponatremia 4/8/2019    Pacemaker     Pulmonary emphysema [facility] )     Radiculopathy     last assessed 1/28/16     Shortness of breath     exertion    Sleep apnea     c pap     Past Surgical History:   Procedure Laterality Date    ANGIOPLASTY      x2 2 stents and then replaced    CARDIAC PACEMAKER PLACEMENT      pacemaker permanent placement dual chamber / last assessed 4/7/14 / implantation     CARDIAC SURGERY      pacemaker    CHOLECYSTECTOMY      CORONARY ANGIOPLASTY WITH STENT PLACEMENT      EPIDURAL BLOCK INJECTION N/A 5/26/2016    Procedure: BLOCK / INJECTION EPIDURAL STEROID LUMBAR  L4-5;  Surgeon: Farhad Martinez MD;  [facility];  Service:     EPIDURAL BLOCK INJECTION N/A 2/14/2019    Procedure: L4 L5 Lumbar Epidural Steroid Injection;  Surgeon: Ed Kendall MD;  Location: [facility] MAIN OR;  Service: Pain Management     EYE SURGERY      cataract left    KNEE ARTHROSCOPY W/ MENISCAL REPAIR Left     LUMBAR EPIDURAL INJECTION N/A 3/17/2016    Procedure: BLOCK / INJECTION LUMBAR  L4-5  (C-ARM); Surgeon: Ed Kendall MD;  Location: [facility] MAIN OR;  Service:     [place] OPEN RX FEMUR FX+INTRAMED ELLA Right 2022    Procedure: INSERTION NAIL IM FEMUR ANTEGRADE (TROCHANTERIC); Surgeon: Caitlyn Shepherd MD;  [facility];  Service: Orthopedics     Social History   Social History     Tobacco Use   Smoking Status Former Smoker    Packs/day: 1 00    Years: 50 00    Pack years: 50 00    Types: Cigarettes    Quit date: 3/27/2019    Years since quittin 8   Smokeless Tobacco Never Used   Tobacco Comment    quit 2021       Occupational history:  No occupational exposure    Family History:   Family History   Problem Relation Age of Onset    Cancer Mother 80    Heart disease Mother     Hypertension Mother     Heart disease Father     Diabetes Neg Hx     Stroke Neg Hx          PhysicalExamination:  Vitals:   Vital signs reviewed in chart at rehab, stable    General: alert, not in acute distress  HEENT: PERRL, no icteric sclera or cyanosis, no thrush  Neck:  Supple, no lymphadenopathy or thyromegaly, no JVD  Lungs:  Equal breath sounds and clear auscultations bilaterally, no wheezing or crackles  Heart: S1S2 regular, 3/6 SM  Abdomen: soft, non-tender, bowel sounds  present  Extrimities: no edema, no clubbing or cyanosis  Neuro: Alert and oriented x 3, no focal neurodeficits   Skin: intact, no rashes      Diagnostic Data:  Labs:   I personally reviewed the most recent laboratory data pertinent to today's visit    Lab Results   Component Value Date    WBC 12 92 (H) 2022    HGB 7 9 (L) 2022    HCT 26 0 (L) 2022    MCV 86 2022     2022     Lab Results   Component Value Date    GLUCOSE 129 (H) 12/11/2017    CALCIUM 9 4 02/06/2022     12/11/2017    K 3 6 02/06/2022    CO2 29 02/06/2022     02/06/2022    BUN 49 (H) 02/06/2022    CREATININE 1 51 (H) 02/06/2022     No results found for: IGE  Lab Results   Component Value Date    ALT 15 02/01/2022    AST 28 02/01/2022    ALKPHOS 53 02/01/2022    BILITOT 0 7 12/11/2017       PFT results: The most recent pulmonary function tests were reviewed  1  Moderate obstructive air flow limitation, mildly decreased vital capacity secondary to air trapping  2  No significant bronchodilator response  3  Normal lung volumes but increased RV/TLC ratio indication air trapping   4  Moderately impaired diffusion capacity  5  Obstructive flow volume loop      Imaging:  I personally reviewed the images on the [facility] pertinent to today's visit  Most recent chest x-ray reviewed on PACs:  Mild congestion/CHF with trace right pleural effusion    Chest CT scan from July 2021 reviewed on PACs:  Emphysematous changes with some bronchial thickening  Few lung nodules stable from before, largest is 7 mm      Other studies:  Echocardiogram: LVEF 65%, mildly dilated RV with normal systolic function, moderately dilated LA, severe AS with mild to moderate AR, mild MR with moderate MS      Katerine Reina MD

## 2022-02-10 NOTE — ASSESSMENT & PLAN NOTE
Location sacrum  - as per medical Record review, started as deep tissue injury on February 5, 2022  Treatment use in acute care is border foam     Assessment  -wound size is stent x6 x 0 1 cm , 50% slough, 50% epithelial, with no obvious sign of infection  - patient is on prosource, Floxin toe oil, vitamin-B, vitamin-D  - needs assistance with turning when in bed  - incontinence of both bowel and bladder    Plan  - the goal is to heal the wound  Clinical factors affecting wound healing includes incontinence, limited range of motion, and poor appetite  - local wound care with triad  - offload    Order air mattress  - continues supplements  - followup next week

## 2022-02-10 NOTE — PATIENT INSTRUCTIONS
Orders Placed This Encounter   Procedures    Wound cleansing and dressings     Wound:  Sacrum  Discontinue previous wound order  Cleanse the wound bed with NSS   Apply non-sting skin prep to periwound area  Apply Traid paste to wound bed, then cover with bordered foam  Frequency : daily and prn for soiling  Order air mattress  Offload all wounds  Turn and reposition frequently, maximum of every two hours  Instruct / Assist with weight shifting every 15 - 20 minutes when in chair  Increase protein intake  Monitor for any sign of infection or worsening, inform PCP or patient's primary physician in your facility       Standing Status:   Future     Standing Expiration Date:   2/9/2023

## 2022-02-10 NOTE — PROGRESS NOTES
[address] MANAGEMENT   AND HYPERBARIC MEDICINE CENTER       Patient ID: Viry Carreon is a 80 y o  male Date of Birth 1940     Location of Service: [facility]    Performed wound round with:  Wound team      Chief Complaint   Patient presents with    New Patient Visit     sacrum       Wound Instructions:  Orders Placed This Encounter   Procedures    Wound cleansing and dressings     Wound:  Sacrum  Discontinue previous wound order  Cleanse the wound bed with NSS   Apply non-sting skin prep to periwound area  Apply Traid paste to wound bed, then cover with bordered foam  Frequency : daily and prn for soiling  Order air mattress  Offload all wounds  Turn and reposition frequently, maximum of every two hours  Instruct / Assist with weight shifting every 15 - 20 minutes when in chair  Increase protein intake  Monitor for any sign of infection or worsening, inform PCP or patient's primary physician in your facility  Standing Status:   Future     Standing Expiration Date:   2/9/2023       Allergies  Patient has no known allergies  Assessment & Plan:  1  Pressure injury of sacral region, stage 2 [facility])  Assessment & Plan:  Location sacrum  - as per medical Record review, started as deep tissue injury on February 5, 2022  Treatment use in acute care is border foam     Assessment  -wound size is stent x6 x 0 1 cm , 50% slough, 50% epithelial, with no obvious sign of infection  - patient is on prosource, Floxin toe oil, vitamin-B, vitamin-D  - needs assistance with turning when in bed  - incontinence of both bowel and bladder    Plan  - the goal is to heal the wound  Clinical factors affecting wound healing includes incontinence, limited range of motion, and poor appetite  - local wound care with triad  - offload  Order air mattress  - continues supplements  - followup next week    Orders:  -     Wound cleansing and dressings; Future    2   Ambulatory dysfunction  Assessment & Plan:  On short-term rehab             Subjective: This is a 19-year-old male referred to our service for wound on the sacrum  Patient have a complex medical history including but not limited to AFib, essential hypertension, anemia, CAD, CKD 3, COPD, hyperlipidemia and BPH  Patient was referred by senior care team   Patient was seen in coordination with the facility wound team     Received patient in bed, seems comfortable  Patient is a poor historian and was not able to provide information related to his wound  Patient denies pain  Denies fever  The wound was was initially documented on February 5, 2021 as deep tissue injury  Possible etiology is pressure ulcer  Review of Systems   Constitutional: Negative  HENT: Negative  Eyes: Negative  Respiratory: Negative  Cardiovascular: Negative  Gastrointestinal: Negative  Endocrine: Negative  Genitourinary: Negative  Musculoskeletal: Positive for gait problem  Skin: Positive for wound  See HPI   Hematological: Negative  Psychiatric/Behavioral: Negative  All other systems reviewed and are negative  Objective: There were no vitals taken for this visit  Physical Exam  Constitutional:       Appearance: Normal appearance  HENT:      Head: Normocephalic and atraumatic  Nose: Nose normal       Mouth/Throat:      Mouth: Mucous membranes are moist    Eyes:      Conjunctiva/sclera: Conjunctivae normal    Cardiovascular:      Rate and Rhythm: Normal rate  Pulmonary:      Effort: Pulmonary effort is normal    Abdominal:      Tenderness: There is no abdominal tenderness  Genitourinary:     Comments: Incontinent  Musculoskeletal:      Cervical back: Normal range of motion  Comments: Limited range of motion   Skin:     Findings: Lesion present        Comments: Sacrum - 10 x 6 x 0 1 cm , 50% slough, 50% epithelial, with small amount of serous drainage, no undermining, no tunneling, no malodor, periwound is macerated, wound edge is irregular, no obvious sign of infection, denies pain   Neurological:      Mental Status: He is alert  Gait: Gait abnormal    Psychiatric:         Mood and Affect: Mood normal          Behavior: Behavior normal               Procedures           Patient's care was coordinated with nursing facility staff  Recent vitals, labs and updated medications were reviewed on EMR or chart system of facility   Past Medical, surgical, social, medication and allergy history and patient's previous records were reviewed and updated as appropriate:     Patient Active Problem List   Diagnosis    Chronic pain syndrome    Bilateral lumbar radiculopathy    Lumbar canal stenosis    Pain in both lower extremities    JOO (generalized anxiety disorder)    Benign hypertension with chronic kidney disease, stage III ([facility]    Benign prostatic hyperplasia    Chronic diastolic CHF (congestive heart failure) [facility]    Allergic rhinitis    Aneurysm of abdominal aorta ([facility]    Chronic a-fib ([facility] )    Carpal tunnel syndrome    Chronic gout without tophus    Centrilobular emphysema ([facility] )    Deep venous insufficiency    Difficulty in walking    Fecal soiling    Fibromyalgia    Fistula-in-ano    Hyperlipidemia    Moderate or severe vision impairment, one eye    Class 2 obesity due to excess calories without serious comorbidity with body mass index (BMI) of 36 0 to 36 9 in adult    Pacemaker    JOVI (obstructive sleep apnea)    Urinary incontinence    Venous insufficiency (chronic) (peripheral)    Peripheral arteriosclerosis (HCC)    Lumbar radiculopathy    Serum total bilirubin elevated    Stage 3a chronic kidney disease ([facility] )    CAD (coronary artery disease)    Status post primary angioplasty with coronary stent    Mixed simple and mucopurulent chronic bronchitis ([facility])    Hyperuricemia    Bilateral leg edema    Nephrolithiasis    Former smoker    Anemia    Vitamin D insufficiency    Chronic constipation    Medicare annual wellness visit, subsequent    Idiopathic hematuria    Kidney stone on left side    Flank pain    Obesity (BMI 30-39  9)    Dyspnea on exertion    Neuropathy    Coronary artery arteriosclerosis    Essential hypertension    Memory difficulty    COPD (chronic obstructive pulmonary disease) ([facility])    Acute on chronic diastolic heart failure ([facility])    Skin tear of right upper arm without complication    Contusion, buttock, initial encounter    Wheezing    Fall    Closed fracture of right femur ([facility])    Acute blood loss anemia    Constipation    Valvular heart disease    Pressure injury of sacral region, stage 2 ([facility])    Ambulatory dysfunction     Past Medical History:   Diagnosis Date    Arthritis     Atrial flutter ([facility])     Chronic kidney disease     stage 3    Coronary artery disease     2 stents    Fluid retention     Gout     Heart failure ([facility])     pacemaker    Hypertension     Hyponatremia 4/8/2019    Pacemaker     Pulmonary emphysema ([facility])     Radiculopathy     last assessed 1/28/16     Shortness of breath     exertion    Sleep apnea     c pap     Past Surgical History:   Procedure Laterality Date    ANGIOPLASTY      x2 2 stents and then replaced    CARDIAC PACEMAKER PLACEMENT      pacemaker permanent placement dual chamber / last assessed 4/7/14 / implantation     CARDIAC SURGERY      pacemaker    CHOLECYSTECTOMY      CORONARY ANGIOPLASTY WITH STENT PLACEMENT      EPIDURAL BLOCK INJECTION N/A 5/26/2016    Procedure: BLOCK / INJECTION EPIDURAL STEROID LUMBAR  L4-5;  Surgeon: John No MD;  Location: [place] [address] MAIN OR;  Service:     EPIDURAL BLOCK INJECTION N/A 2/14/2019    Procedure: L4 L5 Lumbar Epidural Steroid Injection;  Surgeon: John No MD;  Location: [place] [address] MAIN OR;  Service: Pain Management     EYE SURGERY      cataract left    KNEE ARTHROSCOPY W/ MENISCAL REPAIR Left     LUMBAR EPIDURAL INJECTION N/A 3/17/2016 Procedure: BLOCK / INJECTION LUMBAR  L4-5  (C-ARM); Surgeon: June Nelson MD;  Location[facility];  Service:     [place] OPEN RX FEMUR FX+INTRAMED ELLA Right 2022    Procedure: INSERTION NAIL IM FEMUR ANTEGRADE (TROCHANTERIC); Surgeon: Marguerite Vaca MD;  [facility];  Service: Orthopedics     Social History     Socioeconomic History    Marital status: /Civil Union     Spouse name: None    Number of children: None    Years of education: None    Highest education level: None   Occupational History    Occupation: RETIRED   Tobacco Use    Smoking status: Former Smoker     Packs/day: 1 00     Years: 50 00     Pack years: 50 00     Types: Cigarettes     Quit date: 3/27/2019     Years since quittin 8    Smokeless tobacco: Never Used    Tobacco comment: quit 2021   Vaping Use    Vaping Use: Former   Substance and Sexual Activity    Alcohol use: Never    Drug use: Never    Sexual activity: None   Other Topics Concern    None   Social History Narrative    Daily tea consumption 10 cups day      Social Determinants of Health     Financial Resource Strain: Not on file   Food Insecurity: No Food Insecurity    Worried About Running Out of Food in the Last Year: Never true    Clemente of Food in the Last Year: Never true   Transportation Needs: No Transportation Needs    Lack of Transportation (Medical): No    Lack of Transportation (Non-Medical):  No   Physical Activity: Not on file   Stress: Not on file   Social Connections: Not on file   Intimate Partner Violence: Not on file   Housing Stability: Unknown    Unable to Pay for Housing in the Last Year: No    Number of Places Lived in the Last Year: Not on file    Unstable Housing in the Last Year: No        Current Outpatient Medications:     acetaminophen (TYLENOL) 650 mg CR tablet, Take 1 tablet (650 mg total) by mouth every 8 (eight) hours as needed for mild pain, Disp: 30 tablet, Rfl: 0    albuterol (Ventolin HFA) 90 mcg/act inhaler, Inhale 2 puffs every 6 (six) hours as needed for wheezing, Disp: 3 Inhaler, Rfl: 3    allopurinol (ZYLOPRIM) 100 mg tablet, Take 2 tablets (200 mg total) by mouth daily, Disp: 180 tablet, Rfl: 3    ascorbic acid (VITAMIN C) 500 mg tablet, Take 500 mg by mouth daily  , Disp: , Rfl:     aspirin 81 mg chewable tablet, Chew 1 tablet (81 mg total) daily, Disp: , Rfl: 0    B Complex Vitamins (B COMPLEX 1 PO), Take 1 tablet by mouth daily  , Disp: , Rfl:     Biotin (BIOTIN 5000) 5 MG CAPS, Take 1,000 mcg by mouth daily  , Disp: , Rfl:     calcium carbonate-vitamin D (OSCAL-D) 500 mg-200 units per tablet, Take 2 tablets by mouth daily at bedtime  , Disp: , Rfl:     cholecalciferol (VITAMIN D3) 1,000 units tablet, Take 1,000 Units by mouth daily, Disp: , Rfl:     Cranberry 1000 MG CAPS, Take 1 tablet by mouth 2 (two) times a day , Disp: , Rfl:     cyanocobalamin 1000 MCG tablet, Take 100 mcg by mouth daily, Disp: , Rfl:     dextromethorphan-guaiFENesin (ROBITUSSIN DM)  mg/5 mL syrup, Take 10 mL by mouth every 4 (four) hours as needed for cough, Disp: 118 mL, Rfl: 0    docusate sodium (COLACE) 100 mg capsule, Take 1 capsule (100 mg total) by mouth 2 (two) times a day, Disp: 10 capsule, Rfl: 0    DULoxetine (CYMBALTA) 60 mg delayed release capsule, Take 1 capsule (60 mg total) by mouth daily, Disp: 90 capsule, Rfl: 1    finasteride (PROSCAR) 5 mg tablet, Take 1 tablet (5 mg total) by mouth daily, Disp: 90 tablet, Rfl: 3    Flaxseed, Linseed, (EQL FLAX SEED OIL) 1000 MG CAPS, Take by mouth daily  , Disp: , Rfl:     fluticasone-umeclidinium-vilanterol (Trelegy Ellipta) 200-62 5-25 MCG/INH AEPB inhaler, Inhale 1 puff daily Rinse mouth after use , Disp: 180 blister, Rfl: 3    furosemide (LASIX) 20 mg tablet, Take 1 tablet (20 mg total) by mouth every evening, Disp: , Rfl: 0    furosemide (LASIX) 40 mg tablet, Take 1 tablet (40 mg total) by mouth daily, Disp: , Rfl: 0    oxyCODONE (ROXICODONE) 5 immediate release tablet, Take 1 tablets every 6 hrs as needed for pain control, Disp: 30 tablet, Rfl: 0    rivaroxaban (XARELTO) 15 mg tablet, Take 1 tablet (15 mg total) by mouth daily with breakfast (Patient taking differently: Take 20 mg by mouth daily at bedtime ), Disp: , Rfl:     simvastatin (ZOCOR) 20 mg tablet, Take 1 tablet (20 mg total) by mouth daily at bedtime, Disp: 90 tablet, Rfl: 1  Family History   Problem Relation Age of Onset    Cancer Mother 80    Heart disease Mother     Hypertension Mother     Heart disease Father     Diabetes Neg Hx     Stroke Neg Hx               Coordination of Care:  Wound team aware of the treatment plan    Patient / Staff education : Patient / Staff was given education on sign of infection and pressure ulcer prevention  Patient/ Staff verbalized understanding     Follow up :  Return in about 1 week (around 2/16/2022)  Voice-recognition software may have been used in the preparation of this document  Occasional wrong word or "sound-alike" substitutions may have occurred due to the inherent limitations of voice recognition software  Interpretation should be guided by context        NAVA Law

## 2022-02-10 NOTE — ASSESSMENT & PLAN NOTE
Wt Readings from Last 3 Encounters:   02/07/22 129 kg (284 lb 6 3 oz)   01/24/22 131 kg (289 lb)   01/06/22 130 kg (286 lb)     Currently compensated, continue diuretics and follow with Cardiology

## 2022-02-11 ENCOUNTER — NURSING HOME VISIT (OUTPATIENT)
Dept: GERIATRICS | Facility: OTHER | Age: 82
End: 2022-02-11
Payer: MEDICARE

## 2022-02-11 VITALS
WEIGHT: 143.4 LBS | RESPIRATION RATE: 18 BRPM | DIASTOLIC BLOOD PRESSURE: 66 MMHG | HEART RATE: 77 BPM | OXYGEN SATURATION: 95 % | BODY MASS INDEX: 18.92 KG/M2 | SYSTOLIC BLOOD PRESSURE: 120 MMHG | TEMPERATURE: 97.9 F

## 2022-02-11 DIAGNOSIS — S72.91XD CLOSED FRACTURE OF RIGHT FEMUR WITH ROUTINE HEALING, UNSPECIFIED FRACTURE MORPHOLOGY, UNSPECIFIED PORTION OF FEMUR, SUBSEQUENT ENCOUNTER: Primary | ICD-10-CM

## 2022-02-11 PROCEDURE — 99309 SBSQ NF CARE MODERATE MDM 30: CPT | Performed by: INTERNAL MEDICINE

## 2022-02-11 NOTE — PROGRESS NOTES
Kothari Agustin  [facility]  [address]   [address] [place], [place]  49     Progress Note  Code SNF 31    Patient Location     [facility] rehab    Reason for visit     Follow-up follow-up right femur fracture, neuropathy, acute on chronic renal failure, aortic stenosis, AFib    Patients care was coordinated with nursing facility staff  Recent vitals, labs and updated medications were reviewed on [facility]OOK system of facility  Problem List Items Addressed This Visit        Musculoskeletal and Integument    Closed fracture of right femur ([address] ) - Primary     Status post surgical repair  Patient reports having chronic pain in bilateral lower extremities  Does not appear to have acute issues with hip pain  Continue Xarelto for DVT prophylaxis  Continue PT OT  Follow-up with orthopedic service               Neuropathy:  Continue duloxetine    Aortic stenosis:  Patient denies any dyspnea or chest pain at present  Continue furosemide 60 mg a day    Acute on chronic renal failure:  Recent creatinine was at baseline at 1 4  Avoid hypotension and nephrotoxins    Chronic atrial fibrillation:  Heart rate stable without any rate lowering medication  Continue Xarelto    Hyperlipidemia:  Continue statin    History of gout:  Continue allopurinol    Sacral wound:  No signs of active infection  Continue local care per wound care team    HPI         Patient is being seen for a follow-up visit today  He is doing okay at present  Reports having chronic pain in his legs  No fever chills dyspnea or chest congestion  Pt complaints of pain at the coccyx area at the site of skin breakdown  Review of Systems   Respiratory: Negative for cough, shortness of breath and stridor  Cardiovascular: Negative for chest pain  Gastrointestinal: Negative for abdominal distention, abdominal pain and vomiting  Genitourinary: Negative for dysuria and hematuria     Musculoskeletal: Positive for arthralgias (Neck pain involving bilateral lower extremities) and gait problem  Neurological: Negative for tremors and seizures  Past Medical History:   Diagnosis Date    Arthritis     Atrial flutter ([facility] [address] )     Chronic kidney disease     stage 3    Coronary artery disease     2 stents    Fluid retention     Gout     Heart failure ([facility] [address] )     pacemaker    Hypertension     Hyponatremia 2019    Pacemaker     Pulmonary emphysema ([facility] [address] )     Radiculopathy     last assessed 16     Shortness of breath     exertion    Sleep apnea     c pap       Past Surgical History:   Procedure Laterality Date    ANGIOPLASTY      x2 2 stents and then replaced    CARDIAC PACEMAKER PLACEMENT      pacemaker permanent placement dual chamber / last assessed 14 / implantation     CARDIAC SURGERY      pacemaker    CHOLECYSTECTOMY      CORONARY ANGIOPLASTY WITH STENT PLACEMENT      EPIDURAL BLOCK INJECTION N/A 2016    Procedure: BLOCK / INJECTION EPIDURAL STEROID LUMBAR  L4-5;  Surgeon: Corinne Lyons MD;  Location: [place] [address] MAIN OR;  Service:     EPIDURAL BLOCK INJECTION N/A 2019    Procedure: L4 L5 Lumbar Epidural Steroid Injection;  Surgeon: Corinne Lyons MD;  Location: [place] [address] MAIN OR;  Service: Pain Management     EYE SURGERY      cataract left    KNEE ARTHROSCOPY W/ MENISCAL REPAIR Left     LUMBAR EPIDURAL INJECTION N/A 3/17/2016    Procedure: BLOCK / INJECTION LUMBAR  L4-5  (C-ARM); Surgeon: Corinne Lyons MD;  Location: [facility] MAIN OR;  Service:     MD OPEN RX FEMUR FX+INTRAMED ELLA Right 2022    Procedure: INSERTION NAIL IM FEMUR ANTEGRADE (TROCHANTERIC);   Surgeon: Brendon Vail MD;  [facility];  Service: Orthopedics       Social History     Tobacco Use   Smoking Status Former Smoker    Packs/day: 1 00    Years: 50 00    Pack years: 50 00    Types: Cigarettes    Quit date: 3/27/2019    Years since quittin 8   Smokeless Tobacco Never Used   Tobacco Comment quit 02/14/2021       Family History   Problem Relation Age of Onset    Cancer Mother 80    Heart disease Mother     Hypertension Mother     Heart disease Father     Diabetes Neg Hx     Stroke Neg Hx         No Known Allergies      Current Outpatient Medications:     acetaminophen (TYLENOL) 650 mg CR tablet, Take 1 tablet (650 mg total) by mouth every 8 (eight) hours as needed for mild pain, Disp: 30 tablet, Rfl: 0    albuterol (Ventolin HFA) 90 mcg/act inhaler, Inhale 2 puffs every 6 (six) hours as needed for wheezing, Disp: 3 Inhaler, Rfl: 3    allopurinol (ZYLOPRIM) 100 mg tablet, Take 2 tablets (200 mg total) by mouth daily, Disp: 180 tablet, Rfl: 3    ascorbic acid (VITAMIN C) 500 mg tablet, Take 500 mg by mouth daily  , Disp: , Rfl:     aspirin 81 mg chewable tablet, Chew 1 tablet (81 mg total) daily, Disp: , Rfl: 0    B Complex Vitamins (B COMPLEX 1 PO), Take 1 tablet by mouth daily  , Disp: , Rfl:     Biotin (BIOTIN 5000) 5 MG CAPS, Take 1,000 mcg by mouth daily  , Disp: , Rfl:     calcium carbonate-vitamin D (OSCAL-D) 500 mg-200 units per tablet, Take 2 tablets by mouth daily at bedtime  , Disp: , Rfl:     cholecalciferol (VITAMIN D3) 1,000 units tablet, Take 1,000 Units by mouth daily, Disp: , Rfl:     Cranberry 1000 MG CAPS, Take 1 tablet by mouth 2 (two) times a day , Disp: , Rfl:     cyanocobalamin 1000 MCG tablet, Take 100 mcg by mouth daily, Disp: , Rfl:     dextromethorphan-guaiFENesin (ROBITUSSIN DM)  mg/5 mL syrup, Take 10 mL by mouth every 4 (four) hours as needed for cough, Disp: 118 mL, Rfl: 0    docusate sodium (COLACE) 100 mg capsule, Take 1 capsule (100 mg total) by mouth 2 (two) times a day, Disp: 10 capsule, Rfl: 0    DULoxetine (CYMBALTA) 60 mg delayed release capsule, Take 1 capsule (60 mg total) by mouth daily, Disp: 90 capsule, Rfl: 1    finasteride (PROSCAR) 5 mg tablet, Take 1 tablet (5 mg total) by mouth daily, Disp: 90 tablet, Rfl: 3    Flaxseed, Linseed, (EQL FLAX SEED OIL) 1000 MG CAPS, Take by mouth daily  , Disp: , Rfl:     fluticasone-umeclidinium-vilanterol (Trelegy Ellipta) 200-62 5-25 MCG/INH AEPB inhaler, Inhale 1 puff daily Rinse mouth after use , Disp: 180 blister, Rfl: 3    furosemide (LASIX) 20 mg tablet, Take 1 tablet (20 mg total) by mouth every evening, Disp: , Rfl: 0    furosemide (LASIX) 40 mg tablet, Take 1 tablet (40 mg total) by mouth daily, Disp: , Rfl: 0    oxyCODONE (ROXICODONE) 5 immediate release tablet, Take 1 tablets every 6 hrs as needed for pain control, Disp: 30 tablet, Rfl: 0    rivaroxaban (XARELTO) 15 mg tablet, Take 1 tablet (15 mg total) by mouth daily with breakfast (Patient taking differently: Take 20 mg by mouth daily at bedtime ), Disp: , Rfl:     simvastatin (ZOCOR) 20 mg tablet, Take 1 tablet (20 mg total) by mouth daily at bedtime, Disp: 90 tablet, Rfl: 1    Updated list was reviewed in [facility] of facility  Vitals:    02/11/22 1524   BP: 120/66   Pulse: 77   Resp: 18   Temp: 97 9 °F (36 6 °C)   SpO2: 95%       Physical Exam  HENT:      Head: Normocephalic and atraumatic  Nose: No rhinorrhea  Eyes:      General: No scleral icterus  Right eye: No discharge  Left eye: No discharge  Cardiovascular:      Rate and Rhythm: Normal rate and regular rhythm  Pulmonary:      Breath sounds: No wheezing, rhonchi or rales  Abdominal:      General: There is no distension  Palpations: Abdomen is soft  Tenderness: There is no abdominal tenderness  There is no guarding  Musculoskeletal:      Cervical back: Neck supple  Right lower leg: No edema  Left lower leg: No edema  Skin:     Coloration: Skin is not jaundiced  Comments: Sacral wound +  Detailed description per recent wound care team notes  No cellulitis, no drainage   Neurological:      General: No focal deficit present  Cranial Nerves: No cranial nerve deficit     Psychiatric:         Mood and Affect: Mood normal          Behavior: Behavior normal          Diagnostic Data:    Recent labs were reviewed    Labs done on 02/08/2022 revealed hemoglobin of 8 5, WBC count 11 4, platelet count 160   BUN 41, creatinine 1 48, sodium 140, potassium 4 2      This note was electronically signed by Dr Alberto Dwyer

## 2022-02-11 NOTE — ASSESSMENT & PLAN NOTE
Status post surgical repair  Patient reports having chronic pain in bilateral lower extremities  Does not appear to have acute issues with hip pain  Continue Xarelto for DVT prophylaxis  Continue PT OT    Follow-up with orthopedic service

## 2022-02-14 ENCOUNTER — TELEPHONE (OUTPATIENT)
Dept: FAMILY MEDICINE CLINIC | Facility: CLINIC | Age: 82
End: 2022-02-14

## 2022-02-14 NOTE — TELEPHONE ENCOUNTER
Pt spouse calling pt had surgery and is now in a rehab facility  Spouse is requesting a call from Dr Apollo Mcmahon to discuss medications  She asked if he could call her tomorrow she will be home all day

## 2022-02-15 ENCOUNTER — HOSPITAL ENCOUNTER (OUTPATIENT)
Dept: RADIOLOGY | Facility: HOSPITAL | Age: 82
Discharge: HOME | End: 2022-02-15
Attending: ORTHOPAEDIC SURGERY
Payer: MEDICARE

## 2022-02-15 ENCOUNTER — OFFICE VISIT (OUTPATIENT)
Dept: OBGYN CLINIC | Facility: CLINIC | Age: 82
End: 2022-02-15

## 2022-02-15 ENCOUNTER — NURSING HOME VISIT (OUTPATIENT)
Dept: GERIATRICS | Facility: OTHER | Age: 82
End: 2022-02-15
Payer: MEDICARE

## 2022-02-15 VITALS — BODY MASS INDEX: 18.95 KG/M2 | WEIGHT: 143 LBS | HEIGHT: 73 IN
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
## 2022-02-15 DIAGNOSIS — Z98.890 STATUS POST SURGERY: Primary | ICD-10-CM

## 2022-02-15 DIAGNOSIS — I48.20 CHRONIC A-FIB (HCC): ICD-10-CM

## 2022-02-15 DIAGNOSIS — R26.2 AMBULATORY DYSFUNCTION: Primary | ICD-10-CM

## 2022-02-15 DIAGNOSIS — I50.32 CHRONIC DIASTOLIC CHF (CONGESTIVE HEART FAILURE) (HCC): ICD-10-CM

## 2022-02-15 DIAGNOSIS — D62 ACUTE BLOOD LOSS ANEMIA: ICD-10-CM

## 2022-02-15 DIAGNOSIS — N18.31 STAGE 3A CHRONIC KIDNEY DISEASE (HCC): ICD-10-CM

## 2022-02-15 DIAGNOSIS — M79.605 PAIN IN BOTH LOWER EXTREMITIES: ICD-10-CM

## 2022-02-15 DIAGNOSIS — R53.1 GENERALIZED WEAKNESS: ICD-10-CM

## 2022-02-15 DIAGNOSIS — G89.4 CHRONIC PAIN SYNDROME: ICD-10-CM

## 2022-02-15 DIAGNOSIS — L89.310 PRESSURE INJURY OF RIGHT BUTTOCK, UNSTAGEABLE (HCC): ICD-10-CM

## 2022-02-15 DIAGNOSIS — K59.00 CONSTIPATION, UNSPECIFIED CONSTIPATION TYPE: ICD-10-CM

## 2022-02-15 DIAGNOSIS — M79.604 PAIN IN BOTH LOWER EXTREMITIES: ICD-10-CM

## 2022-02-15 DIAGNOSIS — Z98.890 STATUS POST SURGERY: ICD-10-CM

## 2022-02-15 PROCEDURE — 73552 X-RAY EXAM OF FEMUR 2/>: CPT

## 2022-02-15 PROCEDURE — 99024 POSTOP FOLLOW-UP VISIT: CPT | Performed by: ORTHOPAEDIC SURGERY

## 2022-02-15 PROCEDURE — 99309 SBSQ NF CARE MODERATE MDM 30: CPT | Performed by: NURSE PRACTITIONER

## 2022-02-15 NOTE — PROGRESS NOTES
Assessment:   Diagnosis ICD-10-CM Associated Orders   1  Status post surgery  [id number] XR femur 2 vw right       Plan:   - Weight bearing as tolerated on the right lower extremity  -  Patient REQUIRES physical therapy at least 30-60mins per day for gait training, muscle strengthening, please include AROM & PROM of hip and knee  - Please have wound care evaluate the sacral cubitus ulcers and needs to be checked on a daily basis due to it getting worse  - Goal for patient to utilize a cane within 2-3 weeks   - Evaluate patient for discharge and continued care with home health  - At his follow up in 4 weeks, a cortisone injection can be done  To do next visit:  Return in about 4 weeks (around 3/15/2022) for right knee & right hip  The above stated was discussed in layman's terms and the patient expressed understanding  All questions were answered to the patient's satisfaction  Scribe Attestation    I,:  Taryn Witt am acting as a scribe while in the presence of the attending physician :       I,:  Marnie Aleman MD personally performed the services described in this documentation    as scribed in my presence :             Subjective:   Amy Hernandez is a 80 y o  male who presents today for her first post op visit for her right femur IM nail antegrade, 1/31/22  He is accompanied with his daughter today  He is currently residing at [facility]  His daughter states that hasn't been getting enough  PT  She has a secondary pressure injury to the sacral region  Post operatively he has no pain after the surgery  He has most pain and discomfort with the sacral region  He notes that they haven't changed the dressing frequently enough  There is a wound care team and has not seen him and he has been there for 8 days  He does have a hx of neuropathy         Review of systems negative unless otherwise specified in HPI  Review of Systems   Constitutional: Negative for chills, fever and unexpected weight change  HENT: Negative for hearing loss, nosebleeds and sore throat  Eyes: Negative for pain, redness and visual disturbance  Respiratory: Negative for cough, shortness of breath and wheezing  Cardiovascular: Negative for chest pain, palpitations and leg swelling  Gastrointestinal: Negative for abdominal pain, nausea and vomiting  Endocrine: Negative for polydipsia and polyuria  Genitourinary: Negative for dysuria and hematuria  Skin: Negative for rash and wound  Neurological: Negative for dizziness, light-headedness and headaches  Psychiatric/Behavioral: Negative for decreased concentration, dysphoric mood and suicidal ideas  The patient is not nervous/anxious          Past Medical History:   Diagnosis Date    Arthritis     [facility]     Chronic kidney disease     stage 3    Coronary artery disease     2 stents    Fluid retention     Gout     Heart failure ([facility] )     pacemaker    Hypertension     Hyponatremia 4/8/2019    Pacemaker     Pulmonary emphysema ([facility] )     Radiculopathy     last assessed 1/28/16     Shortness of breath     exertion    Sleep apnea     c pap       Past Surgical History:   Procedure Laterality Date    ANGIOPLASTY      x2 2 stents and then replaced    CARDIAC PACEMAKER PLACEMENT      pacemaker permanent placement dual chamber / last assessed 4/7/14 / implantation     CARDIAC SURGERY      pacemaker    CHOLECYSTECTOMY      CORONARY ANGIOPLASTY WITH STENT PLACEMENT      EPIDURAL BLOCK INJECTION N/A 5/26/2016    Procedure: BLOCK / INJECTION EPIDURAL STEROID LUMBAR  L4-5;  Surgeon: Sun Willis MD;  Location: [place] [address] MAIN OR;  Service:     EPIDURAL BLOCK INJECTION N/A 2/14/2019    Procedure: L4 L5 Lumbar Epidural Steroid Injection;  Surgeon: Sun Willis MD;  Location: [place] [address] MAIN OR;  Service: Pain Management     EYE SURGERY      cataract left    KNEE ARTHROSCOPY W/ MENISCAL REPAIR Left     LUMBAR EPIDURAL INJECTION N/A 3/17/2016    Procedure: BLOCK / INJECTION LUMBAR  L4-5  (C-ARM); Surgeon: Rayna Akhtar MD;  Location: [facility] MAIN OR;  Service:     MI OPEN RX FEMUR FX+INTRAMED ELLA Right 2022    Procedure: INSERTION NAIL IM FEMUR ANTEGRADE (TROCHANTERIC); Surgeon: Donavan Hernández MD;  [facility] Main [facility];  Service: Orthopedics       Family History   Problem Relation Age of Onset    Cancer Mother 80    Heart disease Mother     Hypertension Mother     Heart disease Father     Diabetes Neg Hx     Stroke Neg Hx        Social History     Occupational History    Occupation: RETIRED   Tobacco Use    Smoking status: Former Smoker     Packs/day: 1 00     Years: 50 00     Pack years: 50 00     Types: Cigarettes     Quit date: 3/27/2019     Years since quittin 8    Smokeless tobacco: Never Used    Tobacco comment: quit 2021   Vaping Use    Vaping Use: Former   Substance and Sexual Activity    Alcohol use: Never    Drug use: Never    Sexual activity: Not on file         Current Outpatient Medications:     acetaminophen (TYLENOL) 650 mg CR tablet, Take 1 tablet (650 mg total) by mouth every 8 (eight) hours as needed for mild pain, Disp: 30 tablet, Rfl: 0    albuterol (Ventolin HFA) 90 mcg/act inhaler, Inhale 2 puffs every 6 (six) hours as needed for wheezing, Disp: 3 Inhaler, Rfl: 3    allopurinol (ZYLOPRIM) 100 mg tablet, Take 2 tablets (200 mg total) by mouth daily, Disp: 180 tablet, Rfl: 3    ascorbic acid (VITAMIN C) 500 mg tablet, Take 500 mg by mouth daily  , Disp: , Rfl:     aspirin 81 mg chewable tablet, Chew 1 tablet (81 mg total) daily, Disp: , Rfl: 0    B Complex Vitamins (B COMPLEX 1 PO), Take 1 tablet by mouth daily  , Disp: , Rfl:     Biotin (BIOTIN 5000) 5 MG CAPS, Take 1,000 mcg by mouth daily  , Disp: , Rfl:     calcium carbonate-vitamin D (OSCAL-D) 500 mg-200 units per tablet, Take 2 tablets by mouth daily at bedtime  , Disp: , Rfl:     cholecalciferol (VITAMIN D3) 1,000 units tablet, Take 1,000 Units by mouth daily, Disp: , Rfl:     Cranberry 1000 MG CAPS, Take 1 tablet by mouth 2 (two) times a day , Disp: , Rfl:     cyanocobalamin 1000 MCG tablet, Take 100 mcg by mouth daily, Disp: , Rfl:     dextromethorphan-guaiFENesin (ROBITUSSIN DM)  mg/5 mL syrup, Take 10 mL by mouth every 4 (four) hours as needed for cough, Disp: 118 mL, Rfl: 0    docusate sodium (COLACE) 100 mg capsule, Take 1 capsule (100 mg total) by mouth 2 (two) times a day, Disp: 10 capsule, Rfl: 0    DULoxetine (CYMBALTA) 60 mg delayed release capsule, Take 1 capsule (60 mg total) by mouth daily, Disp: 90 capsule, Rfl: 1    finasteride (PROSCAR) 5 mg tablet, Take 1 tablet (5 mg total) by mouth daily, Disp: 90 tablet, Rfl: 3    Flaxseed, Linseed, (EQL FLAX SEED OIL) 1000 MG CAPS, Take by mouth daily  , Disp: , Rfl:     fluticasone-umeclidinium-vilanterol (Trelegy Ellipta) 200-62 5-25 MCG/INH AEPB inhaler, Inhale 1 puff daily Rinse mouth after use , Disp: 180 blister, Rfl: 3    furosemide (LASIX) 20 mg tablet, Take 1 tablet (20 mg total) by mouth every evening, Disp: , Rfl: 0    furosemide (LASIX) 40 mg tablet, Take 1 tablet (40 mg total) by mouth daily, Disp: , Rfl: 0    oxyCODONE (ROXICODONE) 5 immediate release tablet, Take 1 tablets every 6 hrs as needed for pain control, Disp: 30 tablet, Rfl: 0    rivaroxaban (XARELTO) 15 mg tablet, Take 1 tablet (15 mg total) by mouth daily with breakfast (Patient taking differently: Take 20 mg by mouth daily at bedtime ), Disp: , Rfl:     simvastatin (ZOCOR) 20 mg tablet, Take 1 tablet (20 mg total) by mouth daily at bedtime, Disp: 90 tablet, Rfl: 1    No Known Allergies         Vitals:       Objective:                    Right Hip Exam     Range of Motion   Flexion: 80   External rotation: 30   Internal rotation: 5     Muscle Strength   Abduction: 4/5   Adduction: 5/5   Flexion: 3/5     Tests   MARY: negative    Other   Erythema: absent  Sensation: normal  Pulse: present    Comments:  Incisions are clean dry and intact            Diagnostics, reviewed and taken today if performed as documented: The attending physician has personally reviewed the pertinent films in PACS and interpretation is as follows:  Xrays of the right femur 2 views: Hardware is intact with no signs of failure; reduction maintained  Limited visualization of the knee joint noting severe arthritis    Procedures, if performed today:    Procedures    None performed      Portions of the record may have been created with voice recognition software  Occasional wrong word or "sound a like" substitutions may have occurred due to the inherent limitations of voice recognition software  Read the chart carefully and recognize, using context, where substitutions have occurred

## 2022-02-15 NOTE — PATIENT INSTRUCTIONS
Plan:   - Weight bearing as tolerated on the right lower extremity  -  Patient REQUIRES physical therapy at least 30-60mins per day for gait training, muscle strengthening, please include AROM & PROM of hip and knee  - Please have wound care evaluate the sacral cubitus ulcers and needs to be checked on a daily basis due to it getting worse    - Goal for patient to utilize a cane within 2-3 weeks   - Evaluate patient for discharge and continued care with home health

## 2022-02-15 NOTE — TELEPHONE ENCOUNTER
S/w earlier this evening  [facility] and thinking about going home to do rehab  I advised he contact the supervisor or attending doctor to be sure he is getting his rehab sessions as ordered

## 2022-02-16 ENCOUNTER — NURSING HOME VISIT (OUTPATIENT)
Dept: WOUND CARE | Facility: HOSPITAL | Age: 82
End: 2022-02-16
Payer: MEDICARE

## 2022-02-16 ENCOUNTER — PATIENT OUTREACH (OUTPATIENT)
Dept: CASE MANAGEMENT | Facility: HOSPITAL | Age: 82
End: 2022-02-16

## 2022-02-16 DIAGNOSIS — R26.2 AMBULATORY DYSFUNCTION: ICD-10-CM

## 2022-02-16 DIAGNOSIS — L89.310 PRESSURE INJURY OF RIGHT BUTTOCK, UNSTAGEABLE (HCC): Primary | ICD-10-CM

## 2022-02-16 PROBLEM — R53.1 GENERALIZED WEAKNESS: Status: ACTIVE | Noted: 2022-02-16

## 2022-02-16 PROCEDURE — 99308 SBSQ NF CARE LOW MDM 20: CPT | Performed by: NURSE PRACTITIONER

## 2022-02-16 NOTE — PROGRESS NOTES
Facility: [facility]  POS: STR 31  Progress Note    Chief Complaint/Reason for visit: STR follow up    Patient's care was coordinated with nursing facility staff  Recent vitals, labs, and updated medications were review on [facility] system in facility       Assessment/Plan:    Ambulatory dysfunction  Continue PT/OT for strengthening and balance training  Continue to implement fall precautions   Continue with supportive care at [address] [place] [address] with ADLs  Ensure adequate p o  hydration, nutrition and supplement as needed   PT/OT following    Constipation  Reported having a BM in 4 days  Available MiraLax and given by nurse   Continue bowel regimen  Continue to monitor bowel movement  Encourage patient to increase p o  hydration and to increase fiber in diet   Abdomen soft nontender positive bowel sound in all 4 quadrants and no distension  Patient denies abdominal pain, nausea, vomiting    Acute blood loss anemia  Status post acute blood loss anemia postop right femur IM nail in the setting of CKD   Most recent hemoglobin 8 5  No overt bleeding noted or reported by patient or nursing staff  Asymptomatic  In the setting of CKD patient may need transfusion of IV Venofer if hemoglobin decreases  No transfusion required during hospitalization   Will need to follow-up with nephrology as recommended  Will continue to trend and monitor CBC  Next CBC in 1 week  Continue multivitamin and supplements    Fall  Status post mechanical fall on eyes while plowing snow  Patient went to ER with right hip pain, underwent IM nailing of right femur  Continue to implement fall precautions   PT/OT for strengthening and balance training    and family to collaborate to ensure a safe discharge   Continue to evaluate medication regimen and prevent polypharmacy  Continue with supportive care at [address] [place] [address] with ADLs    Pain in both lower extremities  Reports bilateral lower extremity pain involving the calves and foot, chronic in nature, bilateral lower extremity duplex negative for acute and chronic thrombus, per patient this pain has been longstanding  Denies chest pain, dizziness, lightheadedness, palpitation, headache, left arm or jaw or back  Remained afebrile, nontoxic appearance and hemodynamically stable  History of lumbar wrapped the colopathy and chronic lower extremity pain  Lower extremity with neurovascular changes   Patient is obese  Continue Xarelto 15 mg daily, simvastatin 20 mg daily, aspirin 81 mg daily, flaxseed oil capsule a 1000 mg daily    Chronic pain syndrome  History of chronic pain syndrome   Continue with analgesic regimen  Continue with bowel regimen while on opiate therapy to prevent opioid induced constipation  Encourage patient to increase p o  hydration and fiber in diet  Will need to follow-up with PCP and pain management as an outpatient    Stage 3a chronic kidney disease ([facility] )  Lab Results   Component Value Date    EGFR 42 02/06/2022    EGFR 48 02/05/2022    EGFR 50 02/04/2022    CREATININE 1 51 (H) 02/06/2022    CREATININE 1 37 (H) 02/05/2022    CREATININE 1 31 (H) 02/04/2022   Most recent creatinine 1 0 and BUN of 23, stable  Patient has a chronic history of CKD stage 3, his baseline creatinine is between 1 4-1 5   Avoid nephrotoxic medication and hypotension   Renal dose medications as needed   Will need to follow-up with nephrology, patient has been followed [facility]  Next [facility] within 1 week    Chronic a-fib ([facility] )  Heart rate today 76, trends has been between 62 to 82  Continue Xarelto  Denies palpitations, chest pain, lightheadedness or dizziness  Continue to monitor heart rate    Chronic diastolic CHF (congestive heart failure) (HCC)  Wt Readings from Last 3 Encounters:   02/15/22 64 9 kg (143 lb)   02/11/22 65 kg (143 lb 6 4 oz)   02/09/22 125 kg (276 lb 9 6 oz)     Most recent weight 281 6  Bilateral lower extremity with generalized edema and vascular changes  Continue furosemide  Continue to monitor weight  Encourage cardiac low-sodium diet  Patient denies cough shortness of breath or chest congestion  Will need to follow-up with cardiology as an outpatient    Pressure injury of right buttock, unstageable   Wound following   Continue daily wound care and follow recommendations  During examination wound was cleaned and dressing in place, clean dry and intact    Generalized weakness  Continue PT/OT for strengthening and balance training   Continue to implement fall precaution   Continue with supportive care   Ensure adequate p o  hydration, nutrition and supplement as needed   OT PT following    History of Present Illness: HPI   80-year old male seen and examined for STR follow up  Alert to self, month, and year  Reports ulcer in his buttocks + excoriaiton, and follows wound care  Additionally reports chronic pain in B/L calves and feet  Per patient this is chronic  During recent hospitalization work up was negative for DVT  On Xarelto and ASA  + for constipation, miralax given  Generalized edema and vascular changes in b/l lower extremity  Status post right femur surgical repair  On 2/8/22 underwent  Right IM nailng femur antegrade- trochanteric  Reports eating and sleeping well  Weight stable  Recent lab work revealed BUN of 23, creatinine of 1 0, and all lytes within normal limits  Recent hemoglobin 8 5, hematocrit 27 4, WBC 7 5, and platelets 578  Will repeat CBC in 1 week  Denies chest pain, abdominal pain, lightheaded, dizziness, nausea, vomiting, fever, chills or urinary discomfort       Past Medical History: reviewed and updated  Past Medical History:   Diagnosis Date    Arthritis     At[facility] flutter [facility]     Chronic kidney disease     stage 3    Coronary artery disease     2 stents    Fluid retention     Gout     Heart failure ([facility]     pacemaker    Hypertension     Hyponatremia 4/8/2019    Pacemaker     Pulmonary emphysema ([facility]     Radiculopathy     last assessed 1/28/16     Shortness of breath     exertion    Sleep apnea     c pap     Family History: reviewed and updated  Social History: reviewed and updated  Resident Since:   Review of systems: Review of Systems   Constitutional: Positive for activity change  Negative for fatigue and fever  Obese    HENT: Negative for congestion, postnasal drip, rhinorrhea and trouble swallowing  Eyes: Negative  Respiratory: Negative for cough, chest tightness, shortness of breath and wheezing  Cardiovascular: Positive for leg swelling (generalized )  Negative for chest pain  Gastrointestinal: Positive for constipation (requested miralax)  Negative for abdominal distention, abdominal pain, diarrhea, nausea and vomiting  Genitourinary: Negative for difficulty urinating, frequency and urgency  Musculoskeletal: Positive for gait problem  Negative for arthralgias, back pain and myalgias  Skin: Positive for wound (buttock )  Neurological: Positive for weakness (generalized)  Negative for dizziness, tremors and headaches  Psychiatric/Behavioral: Negative for agitation and behavioral problems  Medications: All medication and routine orders were reviewed and updated  Allergies: NKDA  Consults reviewed:PT, OT, Speech and Nutrition  Labs/Diagnostics (reviewed by this provider): Copy in Chart    Imaging Reviewed:    Physical Exam    Weight:280 8 pounds  Temp:78 BP:126/77    Pulse:78      Resp: 18   O2 Sat:  94% RA  Constitutional: Obese  Orientation:Person, Month and Year     Physical Exam  Vitals and nursing note reviewed  Constitutional:       General: He is not in acute distress  Appearance: He is obese  He is not ill-appearing, toxic-appearing or diaphoretic  HENT:      Head: Normocephalic and atraumatic  Nose: No congestion or rhinorrhea  Mouth/Throat:      Mouth: Mucous membranes are moist       Pharynx: Oropharynx is clear  Eyes:      General:         Right eye: No discharge  Left eye: No discharge  Conjunctiva/sclera: Conjunctivae normal    Cardiovascular:      Rate and Rhythm: Normal rate and regular rhythm  Pulses: Normal pulses  Heart sounds: Murmur heard  Comments: B/l LE vascular changes   Pulmonary:      Effort: Pulmonary effort is normal  No respiratory distress  Breath sounds: Normal breath sounds  No wheezing  Abdominal:      General: Bowel sounds are normal  There is no distension  Palpations: Abdomen is soft  Tenderness: There is no abdominal tenderness  There is no guarding  Musculoskeletal:         General: No swelling or tenderness  Cervical back: Normal range of motion and neck supple  No rigidity or tenderness  Right lower leg: Edema present  Left lower leg: Edema present  Comments: Moving all 4 extremities     Neurological:      General: No focal deficit present  Mental Status: He is alert  Cranial Nerves: No cranial nerve deficit  Motor: Weakness (generalized ) present  Gait: Gait abnormal      This note was completed in part utilizing Fan[employer] direct voice recognition software  Grammatical errors, random word insertion, spelling mistakes, and incomplete sentences may be an occasional consequence of the system secondary to software limitations, ambient noise and hardware issues  At the time of dictation, efforts were made to edit, clarify and/or correct errors  Please read the chart carefully and recognize, using context, where substitutions have occurred  If you have any questions or concerns about the context, text or information contained within the body of this dictation, please contact myself, the provider, for further clarification      Beauford Homans, CRNP  4/13/[id number] PM

## 2022-02-17 ENCOUNTER — NURSING HOME VISIT (OUTPATIENT)
Dept: GERIATRICS | Facility: OTHER | Age: 82
End: 2022-02-17
Payer: MEDICARE

## 2022-02-17 VITALS
TEMPERATURE: 97.7 F | HEART RATE: 78 BPM | DIASTOLIC BLOOD PRESSURE: 77 MMHG | RESPIRATION RATE: 18 BRPM | OXYGEN SATURATION: 92 % | SYSTOLIC BLOOD PRESSURE: 126 MMHG

## 2022-02-17 DIAGNOSIS — S72.91XD CLOSED FRACTURE OF RIGHT FEMUR WITH ROUTINE HEALING, UNSPECIFIED FRACTURE MORPHOLOGY, UNSPECIFIED PORTION OF FEMUR, SUBSEQUENT ENCOUNTER: Primary | ICD-10-CM

## 2022-02-17 DIAGNOSIS — I48.20 CHRONIC A-FIB (HCC): ICD-10-CM

## 2022-02-17 DIAGNOSIS — I50.32 CHRONIC DIASTOLIC CHF (CONGESTIVE HEART FAILURE) (HCC): ICD-10-CM

## 2022-02-17 DIAGNOSIS — J44.9 CHRONIC OBSTRUCTIVE PULMONARY DISEASE, UNSPECIFIED COPD TYPE (HCC): ICD-10-CM

## 2022-02-17 DIAGNOSIS — D62 ACUTE BLOOD LOSS ANEMIA: ICD-10-CM

## 2022-02-17 DIAGNOSIS — R26.2 AMBULATORY DYSFUNCTION: ICD-10-CM

## 2022-02-17 DIAGNOSIS — I25.10 CORONARY ARTERY DISEASE INVOLVING NATIVE CORONARY ARTERY OF NATIVE HEART WITHOUT ANGINA PECTORIS: Chronic | ICD-10-CM

## 2022-02-17 DIAGNOSIS — G89.4 CHRONIC PAIN SYNDROME: ICD-10-CM

## 2022-02-17 DIAGNOSIS — K59.00 CONSTIPATION, UNSPECIFIED CONSTIPATION TYPE: ICD-10-CM

## 2022-02-17 DIAGNOSIS — N18.31 STAGE 3A CHRONIC KIDNEY DISEASE (HCC): ICD-10-CM

## 2022-02-17 PROCEDURE — 99309 SBSQ NF CARE MODERATE MDM 30: CPT | Performed by: NURSE PRACTITIONER

## 2022-02-17 RX ORDER — SENNA PLUS 8.6 MG/1
1 TABLET ORAL DAILY
COMMUNITY
End: 2022-07-20

## 2022-02-17 RX ORDER — POLYETHYLENE GLYCOL 3350 17 G/17G
17 POWDER, FOR SOLUTION ORAL DAILY PRN
COMMUNITY

## 2022-02-17 NOTE — PATIENT INSTRUCTIONS
Orders Placed This Encounter   Procedures    Wound cleansing and dressings     Wound:  Sacrum  Discontinue previous wound order  Cleanse the wound bed with NSS   Apply non-sting skin prep to periwound area  Apply Medihoney gel to wound bed, then cover with bordered foam  Frequency : daily and prn for soiling  Order air mattress  Offload all wounds  Turn and reposition frequently, maximum of every two hours  Instruct / Assist with weight shifting every 15 - 20 minutes when in chair  Increase protein intake  Monitor for any sign of infection or worsening, inform PCP or patient's primary physician in your facility       Standing Status:   Future     Standing Expiration Date:   2/16/2023

## 2022-02-17 NOTE — ASSESSMENT & PLAN NOTE
Status post acute blood loss anemia postop right femur IM nail in the setting of CKD   Most recent hemoglobin 8 5  No overt bleeding noted or reported by patient or nursing staff  Asymptomatic  In the setting of CKD patient may need transfusion of IV Venofer if hemoglobin decreases  No transfusion required during hospitalization   Will need to follow-up with nephrology as recommended  Will continue to trend and monitor CBC  Next CBC in 1 week  Continue multivitamin and supplements

## 2022-02-17 NOTE — ASSESSMENT & PLAN NOTE
Wt Readings from Last 3 Encounters:   02/15/22 64 9 kg (143 lb)   02/11/22 65 kg (143 lb 6 4 oz)   02/09/22 125 kg (276 lb 9 6 oz)   · Weight log reviewed and stable, most recent weight 280 4 lb  · No shortness of breath or worsening edema noted  · Continue Lasix  · Encourage cardiac low-sodium diet  · Continue daily weight  · follow-up with cardiology as recommended on dc

## 2022-02-17 NOTE — ASSESSMENT & PLAN NOTE
Status post mechanical fall on eyes while plowing snow  Patient went to ER with right hip pain, underwent IM nailing of right femur  Continue to implement fall precautions   PT/OT for strengthening and balance training    and family to collaborate to ensure a safe discharge   Continue to evaluate medication regimen and prevent polypharmacy  Continue with supportive care at [facility] with ADLs

## 2022-02-17 NOTE — ASSESSMENT & PLAN NOTE
· Currently stable, no angina shortness of breath noted   · Continue with aspirin, statin,  · Follow-up with cardiology on discharge

## 2022-02-17 NOTE — PROGRESS NOTES
[address] MANAGEMENT   AND [facility]PER[facility]INE [facility]ENTER       Patient ID: David Humphreys is a 80 y o  male Date of Birth 1940     Location of Service: [facility]    Performed wound round with:  Wound team      Chief Complaint   Patient presents with    Follow Up Wound Care Visit     Right buttock       Wound Instructions:  Orders Placed This Encounter   Procedures    Wound cleansing and dressings     Wound:  Sacrum  Discontinue previous wound order  Cleanse the wound bed with NSS   Apply non-sting skin prep to periwound area  Apply Medihoney gel to wound bed, then cover with bordered foam  Frequency : daily and prn for soiling  Order air mattress  Offload all wounds  Turn and reposition frequently, maximum of every two hours  Instruct / Assist with weight shifting every 15 - 20 minutes when in chair  Increase protein intake  Monitor for any sign of infection or worsening, inform PCP or patient's primary physician in your facility  Standing Status:   Future     Standing Expiration Date:   2/16/2023       Allergies  Patient has no known allergies  Assessment & Plan:  1  Pressure injury of right buttock, unstageable ([facility])  Assessment & Plan:  Location - Previous location sacrum , change to right buttock  - as per medical Record review, started as deep tissue injury on February 5, 2022  Treatment use in acute care is border foam      Assessment  -wound size increases, 9 x 8 x 0 1 cm  , 100% slough, with no obvious sign of infection  - patient is on prosource, Floxin toe oil, vitamin-B, vitamin-D  - needs assistance with turning when in bed  - incontinence of both bowel and bladder     Plan  - the goal is to heal the wound  Clinical factors affecting wound healing includes incontinence, limited range of motion, and poor appetite  - local wound care - change to medihoney for autolytic debridement  - offload    Order air mattress  - continues supplements  - followup next week    Orders:  -     Wound cleansing and dressings; Future    2  Ambulatory dysfunction  Assessment & Plan:  On STR             Subjective: This is a follow up for wound on the Right buttock  Patient have a complex medical history including but not limited to AFib, essential hypertension, anemia, CAD, CKD 3, COPD, hyperlipidemia and BPH  Patient was referred by senior care team   Patient was seen in coordination with the facility wound team     Received patient in bed, seems comfortable  Denies pain  Patient have a good appetite  No other significant issues related to the wound  Review of Systems   Constitutional: Negative  HENT: Negative  Eyes: Negative  Respiratory: Negative  Cardiovascular: Negative  Gastrointestinal: Negative  Endocrine: Negative  Genitourinary: Negative  Musculoskeletal: Positive for gait problem  Skin: Positive for wound  See HPI   Hematological: Negative  Psychiatric/Behavioral: Negative  All other systems reviewed and are negative  Objective: There were no vitals taken for this visit  Physical Exam  Constitutional:       Appearance: Normal appearance  HENT:      Head: Normocephalic and atraumatic  Nose: Nose normal       Mouth/Throat:      Mouth: Mucous membranes are moist    Eyes:      Conjunctiva/sclera: Conjunctivae normal    Cardiovascular:      Rate and Rhythm: Normal rate  Pulmonary:      Effort: Pulmonary effort is normal    Abdominal:      Tenderness: There is no abdominal tenderness  Genitourinary:     Comments: Incontinent  Musculoskeletal:      Cervical back: Normal range of motion  Comments: Limited range of motion   Skin:     Findings: Lesion present        Comments: Sacrum - 10 x 6 x 0 1 cm , 50% slough, 50% epithelial, with small amount of serous drainage, no undermining, no tunneling, no malodor, periwound is macerated, wound edge is irregular, no obvious sign of infection, denies pain Neurological:      Mental Status: He is alert  Gait: Gait abnormal    Psychiatric:         Mood and Affect: Mood normal          Behavior: Behavior normal               Procedures           Patient's care was coordinated with nursing facility staff  Recent vitals, labs and updated medications were reviewed on EMR or chart system of facility   Past Medical, surgical, social, medication and allergy history and patient's previous records were reviewed and updated as appropriate:     Patient Active Problem List   Diagnosis    Chronic pain syndrome    Bilateral lumbar radiculopathy    Lumbar canal stenosis    Pain in both lower extremities    JOO (generalized anxiety disorder)    Benign hypertension with chronic kidney disease, stage III ([facility])    Benign prostatic hyperplasia    Chronic diastolic CHF (congestive heart failure) ([facility] )    Allergic rhinitis    Aneurysm of abdominal aorta ([facility])    Chronic [facility]fib [facility]    Carpal tunnel syndrome    Chronic gout without tophus    Centrilobular emphysema ([facility] )    Deep venous insufficiency    Difficulty in walking    Fecal soiling    Fibromyalgia    Fistula-in-ano    Hyperlipidemia    Moderate or severe vision impairment, one eye    Class 2 obesity due to excess calories without serious comorbidity with body mass index (BMI) of 36 0 to 36 9 in adult    Pacemaker    JOVI (obstructive sleep apnea)    Urinary incontinence    Venous insufficiency (chronic) (peripheral)    Peripheral arteriosclerosis (HCC)    Lumbar radiculopathy    Serum total bilirubin elevated    Stage 3a chronic kidney disease ([facility] )    CAD (coronary artery disease)    Status post primary angioplasty with coronary stent    Mixed simple and mucopurulent chronic bronchitis (HCC)    Hyperuricemia    Bilateral leg edema    Nephrolithiasis    Former smoker    Anemia    Vitamin D insufficiency    Chronic constipation    Medicare annual wellness visit, subsequent    Idiopathic hematuria    Kidney stone on left side    Flank pain    Obesity (BMI 30-39  9)    Dyspnea on exertion    Neuropathy    Coronary artery arteriosclerosis    Essential hypertension    Memory difficulty    COPD (chronic obstructive pulmonary disease) ([facility])    Acute on chronic diastolic heart failure ([facility])    Skin tear of right upper arm without complication    Contusion, buttock, initial encounter    Wheezing    Fall    Closed fracture of right femur ([facility])    Acute blood loss anemia    Constipation    Valvular heart disease    Pressure injury of right buttock, unstageable ([facility])    Ambulatory dysfunction     Past Medical History:   Diagnosis Date    Arthritis     Atrial flutter ([facility])     Chronic kidney disease     stage 3    Coronary artery disease     2 stents    Fluid retention     Gout     Heart failure ([facility])     pacemaker    Hypertension     Hyponatremia 4/8/2019    Pacemaker     Pulmonary emphysema ([facility])     Radiculopathy     last assessed 1/28/16     Shortness of breath     exertion    Sleep apnea     c pap     Past Surgical History:   Procedure Laterality Date    ANGIOPLASTY      x2 2 stents and then replaced    CARDIAC PACEMAKER PLACEMENT      pacemaker permanent placement dual chamber / last assessed 4/7/14 / implantation     CARDIAC SURGERY      pacemaker    CHOLECYSTECTOMY      CORONARY ANGIOPLASTY WITH STENT PLACEMENT      EPIDURAL BLOCK INJECTION N/A 5/26/2016    Procedure: BLOCK / INJECTION EPIDURAL STEROID LUMBAR  L4-5;  Surgeon: Lilli Baum MD;  Location: [facility] MAIN OR;  Service:     EPIDURAL BLOCK INJECTION N/A 2/14/2019    Procedure: L4 L5 Lumbar Epidural Steroid Injection;  Surgeon: Lilli Baum MD;  Location: [facility] MAIN OR;  Service: Pain Management     EYE SURGERY      cataract left    KNEE ARTHROSCOPY W/ MENISCAL REPAIR Left     LUMBAR EPIDURAL INJECTION N/A 3/17/2016    Procedure: BLOCK / INJECTION LUMBAR  L4-5  ([facility]);   Surgeon: Lilli Baum MD; Location: [place] [id number] MAIN OR;  Service:     GA OPEN RX FEMUR FX+INTRAMED ELLA Right 2022    Procedure: INSERTION NAIL IM FEMUR ANTEGRADE (TROCHANTERIC); Surgeon: Alcides Navarro MD;  Location[facility] Main [facility];  Service: Orthopedics     Social History     Socioeconomic History    Marital status: /Civil Union     Spouse name: None    Number of children: None    Years of education: None    Highest education level: None   Occupational History    Occupation: RETIRED   Tobacco Use    Smoking status: Former Smoker     Packs/day: 1 00     Years: 50 00     Pack years: 50 00     Types: Cigarettes     Quit date: 3/27/2019     Years since quittin 8    Smokeless tobacco: Never Used    Tobacco comment: quit 2021   Vaping Use    Vaping Use: Former   Substance and Sexual Activity    Alcohol use: Never    Drug use: Never    Sexual activity: None   Other Topics Concern    None   Social History Narrative    Daily tea consumption 10 cups day      Social Determinants of Health     Financial Resource Strain: Not on file   Food Insecurity: No Food Insecurity    Worried About Running Out of Food in the Last Year: Never true    Clemente of Food in the Last Year: Never true   Transportation Needs: No Transportation Needs    Lack of Transportation (Medical): No    Lack of Transportation (Non-Medical):  No   Physical Activity: Not on file   Stress: Not on file   Social Connections: Not on file   Intimate Partner Violence: Not on file   Housing Stability: Unknown    Unable to Pay for Housing in the Last Year: No    Number of Places Lived in the Last Year: Not on file    Unstable Housing in the Last Year: No        Current Outpatient Medications:     acetaminophen (TYLENOL) 650 mg CR tablet, Take 1 tablet (650 mg total) by mouth every 8 (eight) hours as needed for mild pain, Disp: 30 tablet, Rfl: 0    albuterol (Ventolin HFA) 90 mcg/act inhaler, Inhale 2 puffs every 6 (six) hours as needed for wheezing, Disp: 3 Inhaler, Rfl: 3    allopurinol (ZYLOPRIM) 100 mg tablet, Take 2 tablets (200 mg total) by mouth daily, Disp: 180 tablet, Rfl: 3    ascorbic acid (VITAMIN C) 500 mg tablet, Take 500 mg by mouth daily  , Disp: , Rfl:     aspirin 81 mg chewable tablet, Chew 1 tablet (81 mg total) daily, Disp: , Rfl: 0    B Complex Vitamins (B COMPLEX 1 PO), Take 1 tablet by mouth daily  , Disp: , Rfl:     Biotin (BIOTIN 5000) 5 MG CAPS, Take 1,000 mcg by mouth daily  , Disp: , Rfl:     calcium carbonate-vitamin D (OSCAL-D) 500 mg-200 units per tablet, Take 2 tablets by mouth daily at bedtime  , Disp: , Rfl:     cholecalciferol (VITAMIN D3) 1,000 units tablet, Take 1,000 Units by mouth daily, Disp: , Rfl:     Cranberry 1000 MG CAPS, Take 1 tablet by mouth 2 (two) times a day , Disp: , Rfl:     cyanocobalamin 1000 MCG tablet, Take 100 mcg by mouth daily, Disp: , Rfl:     dextromethorphan-guaiFENesin (ROBITUSSIN DM)  mg/5 mL syrup, Take 10 mL by mouth every 4 (four) hours as needed for cough, Disp: 118 mL, Rfl: 0    docusate sodium (COLACE) 100 mg capsule, Take 1 capsule (100 mg total) by mouth 2 (two) times a day, Disp: 10 capsule, Rfl: 0    DULoxetine (CYMBALTA) 60 mg delayed release capsule, Take 1 capsule (60 mg total) by mouth daily, Disp: 90 capsule, Rfl: 1    finasteride (PROSCAR) 5 mg tablet, Take 1 tablet (5 mg total) by mouth daily, Disp: 90 tablet, Rfl: 3    Flaxseed, Linseed, (EQL FLAX SEED OIL) 1000 MG CAPS, Take by mouth daily  , Disp: , Rfl:     fluticasone-umeclidinium-vilanterol (Trelegy Ellipta) 200-62 5-25 MCG/INH AEPB inhaler, Inhale 1 puff daily Rinse mouth after use , Disp: 180 blister, Rfl: 3    furosemide (LASIX) 20 mg tablet, Take 1 tablet (20 mg total) by mouth every evening, Disp: , Rfl: 0    furosemide (LASIX) 40 mg tablet, Take 1 tablet (40 mg total) by mouth daily, Disp: , Rfl: 0    oxyCODONE (ROXICODONE) 5 immediate release tablet, Take 1 tablets every 6 hrs as needed for pain control, Disp: 30 tablet, Rfl: 0    rivaroxaban (XARELTO) 15 mg tablet, Take 1 tablet (15 mg total) by mouth daily with breakfast (Patient taking differently: Take 20 mg by mouth daily at bedtime ), Disp: , Rfl:     simvastatin (ZOCOR) 20 mg tablet, Take 1 tablet (20 mg total) by mouth daily at bedtime, Disp: 90 tablet, Rfl: 1  Family History   Problem Relation Age of Onset    Cancer Mother 80    Heart disease Mother     Hypertension Mother     Heart disease Father     Diabetes Neg Hx     Stroke Neg Hx               Coordination of Care:  Wound team aware of the treatment plan    Patient / Staff education : Patient / Staff was given education on sign of infection and pressure ulcer prevention  Patient/ Staff verbalized understanding     Follow up :  Return in about 1 week (around 2/23/2022)  Voice-recognition software may have been used in the preparation of this document  Occasional wrong word or "sound-alike" substitutions may have occurred due to the inherent limitations of voice recognition software  Interpretation should be guided by context        NAVA Loyola

## 2022-02-17 NOTE — ASSESSMENT & PLAN NOTE
· S/p acute blood loss anemia postop right femur IM nail  · Most recent hemoglobin 8 5 and hematocrit 27 4  · No current signs of bleeding   · Thought to be likely in setting of CKD, made 80s transfusions item number  · Will repeat CBC next week

## 2022-02-17 NOTE — PROGRESS NOTES
[facility]  [address]  [phone]  Jayant Hopper  [address]      NAME: Nahed Jj  AGE: 80 y o   SEX: male    DATE OF ENCOUNTER: 2/17/2022    Assessment and Plan     Problem List Items Addressed This Visit        Respiratory    COPD (chronic obstructive pulmonary disease) (HCC)     · Stable, withouth exacerbation  · Continue trelegy and prn albuterol             Cardiovascular and Mediastinum    CAD (coronary artery disease) (Chronic)     · Currently stable, no angina shortness of breath noted   · Continue with aspirin, statin,  · Follow-up with cardiology on discharge         Chronic diastolic CHF (congestive heart failure) ([facility] )     Wt Readings from Last 3 Encounters:   02/15/22 64 9 kg (143 lb)   02/11/22 65 kg (143 lb 6 4 oz)   02/09/22 125 kg (276 lb 9 6 oz) ·   Weight log reviewed and stable, most recent weight 280 4 lb  · No shortness of breath or worsening edema noted  · Continue Lasix  · Encourage cardiac low-sodium diet  · Continue daily weight  · follow-up with cardiology as recommended on dc               Chronic a-fib ([facility])     · Heart rate log reviewed and stable, most recent heart rate 78   · Heart rates have been trending between the 60s and 80s   · Currently stable, denies any palpitations or chest pain  · Continue Xarelto anticoagulation  · Follow-up cardiology and discharge            Musculoskeletal and Integument    Closed fracture of right femur ([facility] ) - Primary     · Status post surgical repair   · Patient reports having chronic pain in bilateral lower extremities   · Continue Xarelto for anticoagulation   · Continue p r n  oxycodone for pain management   · Continue PT/OT   · Follow-up with orthopedics as recommend            Genitourinary    Stage 3a chronic kidney disease ([facility] )     Lab Results   Component Value Date    EGFR 42 02/06/2022    EGFR 48 02/05/2022    EGFR 50 02/04/2022    CREATININE 1 51 (H) 02/06/2022    CREATININE 1 37 (H) 02/05/2022 CREATININE 1 31 (H) 02/04/2022 ·   Currently stable, most recent creatinine 1 09 and BUN 23  · Baseline creatinine reported to be between 1 4 and 1 5   · Avoid nephrotoxic medication  · Renal dose medications as needed   · Encourage adequate p o  hydration   · Avoid hypotension   · Periodic lab work to monitor renal function  · Follow-up with kidney care as recommend            Other    Chronic pain syndrome     · Does complain of chronic bilateral leg pain  · Continue p r n  oxycodone for pain management   · Continue PT/OT          Acute blood loss anemia     · S/p acute blood loss anemia postop right femur IM nail  · Most recent hemoglobin 8 5 and hematocrit 27 4  · No current signs of bleeding   · Thought to be likely in setting of CKD, made 80s transfusions item number  · Will repeat CBC next week         Constipation     · With complaints of constipation, but reports having bowel movement yesterday   · Is currently on Colace 100 mg b i d  · Will add senna daily and MiraLax p r n   · Encourage adequate p o  hydration  · Continue to monitor         Ambulatory dysfunction     · PT/OT following  · Fall precautions   · Patient's family to come for family training for discharge next week  · Continue with supportive care at short-term rehab with ADLs  · Encourage adequate hydration and nutrition   · Goal is to return home with family   · Continue PT/OT for continued strength and balance training               No orders of the defined types were placed in this encounter       - Counseling Documentation: patient was counseled regarding: importance of compliance with treatment      Chief Complaint     No chief complaint on file  History of Present Illness     Onel Dennis is a 80-year-old male at [facility] for short-term rehab after recent hospitalization for a fall  He was noted to have right femur fracture which required surgical repair by Orthopedics    Patient was also seen by Cardiology Nephrology while hospitalized  He was aggressively diuresed by Cardiology  Patient also had some issues with elevated creatinine which require gentle hydration, creatinine had improved  He is being seen today for follow-up on his acute on chronic medical conditions  His comorbidities include AFib, hypertension, anemia, CAD, CKD, COPD, BPH, hyperlipidemia, and ambulatory dysfunction      Upon examination patient was out of bed in wheelchair, resting  He appeared comfortable and was in no acute distress  Blanca Rockwell he is feeling good, did offer some complaints of constipation and bilateral lower extremity pain which is chronic  See his appetite is good eating mostly between 75 and 100% meals  He is sleeping well at night  Pain is mostly well controlled with p r n  oxycodone  His vital signs have been stable  He denies headaches, dizziness, lightheadedness, chest pain, shortness of breath cough, abdominal pain, and  upset  Per nursing no other acute concerns or issues at this time  The following portions of the patient's history were reviewed and updated as appropriate: allergies, current medications, past family history, past medical history, past social history, past surgical history and problem list     Review of Systems     Review of Systems   Constitutional: Positive for fatigue  Negative for activity change, appetite change, chills, fever and unexpected weight change  HENT: Negative for ear pain and sore throat  Eyes: Negative for pain and visual disturbance  Respiratory: Negative for cough and shortness of breath  Cardiovascular: Negative for chest pain and palpitations  Gastrointestinal: Positive for constipation  Negative for abdominal distention, abdominal pain, diarrhea, nausea and vomiting  Genitourinary: Negative for difficulty urinating, dysuria and hematuria  Musculoskeletal: Positive for arthralgias and gait problem  Negative for back pain  Skin: Negative for color change and rash  Neurological: Positive for weakness  Negative for dizziness, seizures, syncope, light-headedness and headaches  Psychiatric/Behavioral: Negative for dysphoric mood and sleep disturbance  The patient is not nervous/anxious  Active Problem List     Patient Active Problem List   Diagnosis    Chronic pain syndrome    Bilateral lumbar radiculopathy    Lumbar canal stenosis    Pain in both lower extremities    JOO (generalized anxiety disorder)    Benign hypertension with chronic kidney disease, stage III (HCC)    Benign prostatic hyperplasia    Chronic diastolic CHF (congestive heart failure) (HCC)    Allergic rhinitis    Aneurysm of abdominal aorta (HCC)    Chronic a-fib (HCC)    Carpal tunnel syndrome    Chronic gout without tophus    Centrilobular emphysema (HCC)    Deep venous insufficiency    Difficulty in walking    Fecal soiling    Fibromyalgia    Fistula-in-ano    Hyperlipidemia    Moderate or severe vision impairment, one eye    Class 2 obesity due to excess calories without serious comorbidity with body mass index (BMI) of 36 0 to 36 9 in adult    Pacemaker    JOVI (obstructive sleep apnea)    Urinary incontinence    Venous insufficiency (chronic) (peripheral)    Peripheral arteriosclerosis (HCC)    Lumbar radiculopathy    Serum total bilirubin elevated    Stage 3a chronic kidney disease ([address] )    CAD (coronary artery disease)    Status post primary angioplasty with coronary stent    Mixed simple and mucopurulent chronic bronchitis (HCC)    Hyperuricemia    Bilateral leg edema    Nephrolithiasis    Former smoker    Anemia    Vitamin D insufficiency    Chronic constipation    Medicare annual wellness visit, subsequent    Idiopathic hematuria    Kidney stone on left side    Flank pain    Obesity (BMI 30-39  9)    Dyspnea on exertion    Neuropathy    Coronary artery arteriosclerosis    Essential hypertension    Memory difficulty    COPD (chronic obstructive pulmonary disease) ([facility]    Acute on chronic diastolic heart failure ([facility])    Skin tear of right upper arm without complication    Contusion, buttock, initial encounter    Wheezing    Fall    Closed fracture of right femur ([facility])    Acute blood loss anemia    Constipation    Valvular heart disease    Pressure injury of right buttock, unstageable ([facility])    Ambulatory dysfunction    Generalized weakness       Objective     /77   Pulse 78   Temp 97 7 °F (36 5 °C)   Resp 18   SpO2 92%     Physical Exam  Constitutional:       General: He is not in acute distress  Appearance: He is obese  He is not ill-appearing  Comments: Frail appearing    HENT:      Head: Normocephalic  Mouth/Throat:      Mouth: Mucous membranes are dry  Pharynx: No oropharyngeal exudate or posterior oropharyngeal erythema  Eyes:      General:         Right eye: No discharge  Left eye: No discharge  Cardiovascular:      Rate and Rhythm: Normal rate and regular rhythm  Pulses: Normal pulses  Heart sounds: Normal heart sounds  No murmur heard  Pulmonary:      Effort: Pulmonary effort is normal  No respiratory distress  Breath sounds: Normal breath sounds  No wheezing  Abdominal:      General: Bowel sounds are normal  There is no distension  Palpations: Abdomen is soft  Tenderness: There is no abdominal tenderness  Musculoskeletal:      Right lower leg: Edema (trace) present  Left lower leg: Edema (trace) present  Skin:     General: Skin is warm and dry  Coloration: Skin is not jaundiced or pale  Comments: Chronic discoloration of b/l LE   Neurological:      General: No focal deficit present  Mental Status: He is alert and oriented to person, place, and time  Mental status is at baseline  Motor: Weakness present        Gait: Gait abnormal    Psychiatric:         Mood and Affect: Mood normal          Behavior: Behavior normal  Pertinent Laboratory/Diagnostic Studies:  Labs reviewed in facility paper chart  Tongyenalan Katelyn VICK    Please note:  Voice-recognition software may have been used in the preparation of this document  Occasional wrong word or "sound-alike" substitutions may have occurred due to the inherent limitations of voice recognition software  Interpretation should be guided by context

## 2022-02-17 NOTE — ASSESSMENT & PLAN NOTE
Location - Previous location sacrum , change to right buttock  - as per medical Record review, started as deep tissue injury on February 5, 2022  Treatment use in acute care is border foam      Assessment  -wound size increases, 9 x 8 x 0 1 cm  , 100% slough, with no obvious sign of infection  - patient is on prosource, Floxin toe oil, vitamin-B, vitamin-D  - needs assistance with turning when in bed  - incontinence of both bowel and bladder     Plan  - the goal is to heal the wound  Clinical factors affecting wound healing includes incontinence, limited range of motion, and poor appetite  - local wound care - change to medihoney for autolytic debridement  - offload    Order air mattress  - continues supplements  - followup next week

## 2022-02-17 NOTE — ASSESSMENT & PLAN NOTE
Continue PT/OT for strengthening and balance training  Continue to implement fall precautions   Continue with supportive care at [facility] with ADLs  Ensure adequate p o  hydration, nutrition and supplement as needed   PT/OT following

## 2022-02-17 NOTE — ASSESSMENT & PLAN NOTE
Lab Results   Component Value Date    EGFR 42 02/06/2022    EGFR 48 02/05/2022    EGFR 50 02/04/2022    CREATININE 1 51 (H) 02/06/2022    CREATININE 1 37 (H) 02/05/2022    CREATININE 1 31 (H) 02/04/2022   · Currently stable, most recent creatinine 1 09 and BUN 23  · Baseline creatinine reported to be between 1 4 and 1 5   · Avoid nephrotoxic medication  · Renal dose medications as needed   · Encourage adequate p o  hydration   · Avoid hypotension   · Periodic lab work to monitor renal function  · Follow-up with kidney care as recommend

## 2022-02-17 NOTE — TELEPHONE ENCOUNTER
Sw wife  Reports episodes of dizziness and shaking, using with position changes  Advised that I don't suspect its the gabapentin as it does not occur at rest  Consider cardiac causes and f/u with cardiologist as he is on multiple meds, diuretics and has PPM  Agreeable  Still in STR

## 2022-02-17 NOTE — ASSESSMENT & PLAN NOTE
· With complaints of constipation, but reports having bowel movement yesterday   · Is currently on Colace 100 mg b i d     · Will add senna daily and MiraLax p r n   · Encourage adequate p o  hydration  · Continue to monitor

## 2022-02-17 NOTE — ASSESSMENT & PLAN NOTE
Reports bilateral lower extremity pain involving the calves and foot, chronic in nature, bilateral lower extremity duplex negative for acute and chronic thrombus, per patient this pain has been longstanding  Denies chest pain, dizziness, lightheadedness, palpitation, headache, left arm or jaw or back  Remained afebrile, nontoxic appearance and hemodynamically stable  History of lumbar wrapped the colopathy and chronic lower extremity pain  Lower extremity with neurovascular changes   Patient is obese  Continue Xarelto 15 mg daily, simvastatin 20 mg daily, aspirin 81 mg daily, flaxseed oil capsule a 1000 mg daily

## 2022-02-17 NOTE — ASSESSMENT & PLAN NOTE
Wound following   Continue daily wound care and follow recommendations  During examination wound was cleaned and dressing in place, clean dry and intact

## 2022-02-17 NOTE — ASSESSMENT & PLAN NOTE
· Heart rate log reviewed and stable, most recent heart rate 78   · Heart rates have been trending between the 60s and 80s   · Currently stable, denies any palpitations or chest pain  · Continue Xarelto anticoagulation  · Follow-up cardiology and discharge

## 2022-02-17 NOTE — ASSESSMENT & PLAN NOTE
Heart rate today 76, trends has been between 62 to 82  Continue Xarelto  Denies palpitations, chest pain, lightheadedness or dizziness  Continue to monitor heart rate

## 2022-02-17 NOTE — ASSESSMENT & PLAN NOTE
History of chronic pain syndrome   Continue with analgesic regimen  Continue with bowel regimen while on opiate therapy to prevent opioid induced constipation  Encourage patient to increase p o  hydration and fiber in diet  Will need to follow-up with PCP and pain management as an outpatient

## 2022-02-17 NOTE — ASSESSMENT & PLAN NOTE
· Does complain of chronic bilateral leg pain  · Continue p r n  oxycodone for pain management   · Continue PT/OT

## 2022-02-17 NOTE — ASSESSMENT & PLAN NOTE
· Status post surgical repair   · Patient reports having chronic pain in bilateral lower extremities   · Continue Xarelto for anticoagulation   · Continue p r n  oxycodone for pain management   · Continue PT/OT   · Follow-up with orthopedics as recommend

## 2022-02-17 NOTE — ASSESSMENT & PLAN NOTE
Wt Readings from Last 3 Encounters:   02/15/22 64 9 kg (143 lb)   02/11/22 65 kg (143 lb 6 4 oz)   02/09/22 125 kg (276 lb 9 6 oz)     Most recent weight 281 6  Bilateral lower extremity with generalized edema and vascular changes  Continue furosemide  Continue to monitor weight  Encourage cardiac low-sodium diet  Patient denies cough shortness of breath or chest congestion  Will need to follow-up with cardiology as an outpatient

## 2022-02-17 NOTE — ASSESSMENT & PLAN NOTE
Lab Results   Component Value Date    EGFR 42 02/06/2022    EGFR 48 02/05/2022    EGFR 50 02/04/2022    CREATININE 1 51 (H) 02/06/2022    CREATININE 1 37 (H) 02/05/2022    CREATININE 1 31 (H) 02/04/2022   Most recent creatinine 1 0 and BUN of 23, stable  Patient has a chronic history of CKD stage 3, his baseline creatinine is between 1 4-1 5   Avoid nephrotoxic medication and hypotension   Renal dose medications as needed   Will need to follow-up with nephrology, patient has been followed by [facility]  Next [facility] within 1 week

## 2022-02-17 NOTE — ASSESSMENT & PLAN NOTE
Continue PT/OT for strengthening and balance training   Continue to implement fall precaution   Continue with supportive care   Ensure adequate p o  hydration, nutrition and supplement as needed   OT PT following

## 2022-02-17 NOTE — ASSESSMENT & PLAN NOTE
Reported having a BM in 4 days  Available MiraLax and given by nurse   Continue bowel regimen  Continue to monitor bowel movement  Encourage patient to increase p o  hydration and to increase fiber in diet   Abdomen soft nontender positive bowel sound in all 4 quadrants and no distension  Patient denies abdominal pain, nausea, vomiting

## 2022-02-18 ENCOUNTER — NURSING HOME VISIT (OUTPATIENT)
Dept: GERIATRICS | Facility: OTHER | Age: 82
End: 2022-02-18
Payer: MEDICARE

## 2022-02-18 DIAGNOSIS — M79.604 PAIN IN BOTH LOWER EXTREMITIES: ICD-10-CM

## 2022-02-18 DIAGNOSIS — G89.4 CHRONIC PAIN SYNDROME: ICD-10-CM

## 2022-02-18 DIAGNOSIS — D62 ACUTE BLOOD LOSS ANEMIA: ICD-10-CM

## 2022-02-18 DIAGNOSIS — I48.20 CHRONIC A-FIB (HCC): ICD-10-CM
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
## 2022-02-18 DIAGNOSIS — F32.A DEPRESSION, UNSPECIFIED DEPRESSION TYPE: ICD-10-CM

## 2022-02-18 DIAGNOSIS — J44.9 CHRONIC OBSTRUCTIVE PULMONARY DISEASE, UNSPECIFIED COPD TYPE (HCC): ICD-10-CM

## 2022-02-18 DIAGNOSIS — N18.30 BENIGN HYPERTENSION WITH CHRONIC KIDNEY DISEASE, STAGE III (HCC): ICD-10-CM

## 2022-02-18 DIAGNOSIS — R26.2 AMBULATORY DYSFUNCTION: ICD-10-CM

## 2022-02-18 DIAGNOSIS — Z98.890 STATUS POST SURGERY: ICD-10-CM

## 2022-02-18 DIAGNOSIS — I12.9 BENIGN HYPERTENSION WITH CHRONIC KIDNEY DISEASE, STAGE III (HCC): ICD-10-CM

## 2022-02-18 DIAGNOSIS — M79.605 PAIN IN BOTH LOWER EXTREMITIES: ICD-10-CM

## 2022-02-18 DIAGNOSIS — N18.31 STAGE 3A CHRONIC KIDNEY DISEASE (HCC): ICD-10-CM

## 2022-02-18 DIAGNOSIS — R53.1 GENERALIZED WEAKNESS: Primary | ICD-10-CM

## 2022-02-18 DIAGNOSIS — I25.10 CORONARY ARTERY DISEASE INVOLVING NATIVE CORONARY ARTERY OF NATIVE HEART WITHOUT ANGINA PECTORIS: Chronic | ICD-10-CM

## 2022-02-18 DIAGNOSIS — S72.91XD CLOSED FRACTURE OF RIGHT FEMUR WITH ROUTINE HEALING, UNSPECIFIED FRACTURE MORPHOLOGY, UNSPECIFIED PORTION OF FEMUR, SUBSEQUENT ENCOUNTER: ICD-10-CM

## 2022-02-18 DIAGNOSIS — L89.310 PRESSURE INJURY OF RIGHT BUTTOCK, UNSTAGEABLE (HCC): ICD-10-CM

## 2022-02-18 DIAGNOSIS — K59.01 SLOW TRANSIT CONSTIPATION: ICD-10-CM

## 2022-02-18 PROCEDURE — 99316 NF DSCHRG MGMT 30 MIN+: CPT | Performed by: NURSE PRACTITIONER

## 2022-02-18 NOTE — LETTER
February 19, 2022     Eyal Russell DO  [address] [address] [address] [place] [address]    Patient: Cyndee Meigs   YOB: 1940   Date of Visit: 2/18/2022       Dear Dr Ladi De La Cruz: Thank you for referring Lurene Dakins to me for evaluation  Below are my notes for this consultation  If you have questions, please do not hesitate to call me  I look forward to following your patient along with you  Sincerely,        NAVA Parker        CC: No Recipients  Milton Dalal, [facility]  2/19/2022 10:41 AM  [address] [address] [facility] [address]  [phone]  DISCHARGE SUMMARY  POS: STR 31  Facility: [facility]    NAME: Cyndee Meigs  AGE: 80 y o  SEX: male  DATE OF ADMISSION: 2/7/22   DATE OF DISCHARGE:2/19/22 DISCHARGE DISPOSITION: Home    Reason for admission: Patient was admitted from [place]  for rehabilitation after hospitalization for right hip injury after fall, dyspnea on exertion and ambulatory dysfunction  Admission Diagnoses: As mentioned above  Additional Problems:   Discharge Diagnoses: See problem list follow up recommendations below  Generalized weakness  Continue PT/OT for strengthening and balance training   Continue to implement fall precaution   Continue to follow PT/OT recommendation for home discharge   Continue supportive care with ADLs   Encourage adequate p o  hydration, nutrition and supplement as needed     Ambulatory dysfunction  Multifactorial  PT/OT following  Fall precautions   Continue with supportive care with ADLs   Encourage adequate p o  hydration and nutrition   Continue PT/OT for continued strength and balance training   Patient discharging to home today with family    Constipation  Patient reports chronic constipation, however had a bowel movement today   Currently on bowel regimen consisting of Colace 100 mg b i d , senna daily and MiraLax p r n     Encourage adequate p o  hydration and high-fiber nutrition  Continue to monitor at home   Follow-up with PCP on discharge    Acute blood loss anemia  Status post acute blood loss anemia postop right femur IM nail  Most recent hemoglobin 8 5 and hematocrit 27 4, improving   No current signs of bleeding   Patient with history of CKD, had past transfusion  Will need to follow-up with PCP open discharge and may have repeat CBC    Fall  Status post mechanical fall at home   Patient went to ER with right hip pain, underwent IM nailing of right femur   Continue to implement fall precaution  PT/OT for strengthening and balance training   Continue to evaluate medication regimen and prevent polypharmacy  Continue with supportive care with ADLs   Follow-up with PCP on discharge    Pain in both lower extremities  Chronic in nature  Duplex negative for acute and chronic thrombus, per patient this pain has been longstanding  Denies chest pain, dizziness, lightheadedness, palpitation, headache, left arm or jaw or back pain  Remains afebrile, appears nontoxic and hemodynamically stable   Lower extremity positive for neurovascular changes  Patient is obese  Continue Xarelto 50 mg daily, simvastatin 20 mg daily and aspirin 81 mg daily   Patient takes flaxseed oil capsule a 1000 mg daily  Follow-up with PCP on discharge    Chronic pain syndrome  Reports chronic bilateral leg pain   Chronic in nature and per patient it has been longstanding   Continue p r n  oxycodone for pain management   Continue PT/OT   Modification of lifestyle and weight lost may help with symptoms  Follow-up with PCP on discharge    Stage 3a chronic kidney disease ([facility] [address] )  Lab Results   Component Value Date    EGFR 42 02/06/2022    EGFR 48 02/05/2022    EGFR 50 02/04/2022    CREATININE 1 51 (H) 02/06/2022    CREATININE 1 37 (H) 02/05/2022    CREATININE 1 31 (H) 02/04/2022   Currently stable, most recent creatinine 1 0 and BUN of 23  Baseline creatinine reported is between 1 4 and 1 5, per records review   Avoid nephrotoxic medication  Avoid hypotension  Renal dose medication as needed  Encourage adequate p o  hydration   Follow-up with kidney care as recommended    Pressure injury of right buttock, unstageable ([address] )  May follow-up wound care as an outpatient   Continue to follow wound care recommendation with continue in daily wound care   Wound continue to be clean daily and dressing in place clean dry and intact  Positive for excoriation around the buttocks area, continue with daily care and continue to provide timely incontinence care  Follow-up with PCP on discharge for recommendation as an outpatient    Closed fracture of right femur ([address] )  Status post surgical repair, IM nail next patient report having chronic pain bilateral lower extremity   Continue Xarelto for anticoagulation  Continue p r n  oxycodone for pain management   Continue PT/OT  Follow-up with orthopedics as recommended    Chronic a-fib (HCC)  Heart rate log reviewed and stable, most recent heart rate 74   Heart rates have been trending between the 60s and 80s   Currently stable, denies any palpitation or chest pain  Continue Xarelto anticoagulation   Follow-up with cardiology on discharge    Chronic diastolic CHF (congestive heart failure) ([facility] [address] )  Wt Readings from Last 3 Encounters:   02/15/22 64 9 kg (143 lb)   02/11/22 65 kg (143 lb 6 4 oz)   02/09/22 125 kg (276 lb 9 6 oz)   Weight log reviewed and stable, most recent weight 280 4 lb   No shortness of breath worsening edema noted  Continue Lasix  Encourage cardiac low-sodium diet   Continue daily weight   Follow-up with cardiology as recommended     Acute on chronic diastolic heart failure ([facility] [address] )  Wt Readings from Last 3 Encounters:   02/15/22 64 9 kg (143 lb)   02/11/22 65 kg (143 lb 6 4 oz)   02/09/22 125 kg (276 lb 9 6 oz)     Benign hypertension with chronic kidney disease, stage III ([address] )  Lab Results   Component Value Date    EGFR 42 02/06/2022    EGFR 48 02/05/2022    EGFR 50 02/04/2022    CREATININE 1 51 (H) 02/06/2022    CREATININE 1 37 (H) 02/05/2022    CREATININE 1 31 (H) 02/04/2022   History of chronic hypertension  Monitor blood pressure with goal systolic blood pressure less than 160   Currently systolic blood pressure has been between 110s to high 120s  Will need to follow-up with cardiology and nephrology on discharge   And follow-up with PCP within 7-10 days on discharge    CAD (coronary artery disease)  Currently stable, no chest pain, palpitations or shortness of breast noted   Continue with aspirin and statin regimen   Follow-up with Cardiology on discharge    COPD (chronic obstructive pulmonary disease) ([facility] [address] )  Currently stable no exacerbation during STR stay  Continue Trelegy and p r n  albuterol  Follow-up with your PCP on discharge    Depression  Stable  No behaviors reported   Continue duloxetine ACL capsule delayed release particles 60mg  qdaily  follow-up with PCP    Course of stay: Patient was admitted to  [facility]   for rehabilitation following hospitalization  During recent hospitalization work up was negative for DVT  On Xarelto and ASA  + for constipation, miralax given  Generalized edema and vascular changes in b/l lower extremity  Status post right femur surgical repair  On 2/8/22 underwent  Right IM nailng femur antegrade- trochanteric  Reports eating and sleeping well  Weight stable  Recent lab work revealed BUN of 23, creatinine of 1 0, and all lytes within normal limits  Recent hemoglobin 8 5, hematocrit 27 4, WBC 7 5, and platelets 512  Denies chest pain, abdominal pain, lightheaded, dizziness, nausea, vomiting, fever, chills or urinary discomfort  During the resident's stay at [facility], he received a skilled nursing care, PT, OT, dietitian support, social service support and medical management  Patient is scheduled to be discharged home on 02/19/22       PT/OT recommendation for home discharge  Displaced intertrochanteric fracture of right femur, subsequent encounter for closed fracture per routine healing-pulmonary specialty program-minimum to moderate assist for bed mobility, contact guard for transferring, ambulating 60 ft with a rolling walker, contact guard, balance is fair-, independent for upper body dressing, moderate assist for lower body dressing, moderate to maximum assist for toileting  Returning home with wife and daughters  PT, OT, VNA at discharge  LOS 15 days    Labs and testing performed during stay:    BMP reviewed on 02/16/2022- blood glucose 133, BUN 23, creatinine 1 0, sodium 140, potassium 3 6, calcium 8 6, EGFR 63  CBC reviewed on 02/14/2022- hemoglobin 8 5, hematocrit 27 4, WBC 7 5, platelets 927    Discharge Medications: See discharge medication list which was reviewed in [facility] and compared to facility orders for accuracy  Current Outpatient Medications:     acetaminophen (TYLENOL) 325 mg tablet, Take 650 mg by mouth every 6 (six) hours as needed, Disp: , Rfl:     albuterol (Ventolin HFA) 90 mcg/act inhaler, Inhale 2 puffs every 6 (six) hours as needed for wheezing, Disp: 3 Inhaler, Rfl: 3    allopurinol (ZYLOPRIM) 100 mg tablet, Take 2 tablets (200 mg total) by mouth daily, Disp: 180 tablet, Rfl: 3    ascorbic acid (VITAMIN C) 500 mg tablet, Take 500 mg by mouth daily  , Disp: , Rfl:     aspirin 81 mg chewable tablet, Chew 1 tablet (81 mg total) daily, Disp: , Rfl: 0    B Complex Vitamins (B COMPLEX 1 PO), Take 1 tablet by mouth daily  , Disp: , Rfl:     Biotin (BIOTIN 5000) 5 MG CAPS, Take 1,000 mcg by mouth daily  , Disp: , Rfl:     calcium carbonate-vitamin D (OSCAL-D) 500 mg-200 units per tablet, Take 2 tablets by mouth daily at bedtime  , Disp: , Rfl:     cholecalciferol (VITAMIN D3) 1,000 units tablet, Take 1,000 Units by mouth daily, Disp: , Rfl:     Cranberry 1000 MG CAPS, Take 1 tablet by mouth 2 (two) times a day , Disp: , Rfl:     cyanocobalamin 1000 MCG tablet, Take 100 mcg by mouth daily, Disp: , Rfl:    dextromethorphan-guaiFENesin (ROBITUSSIN DM)  mg/5 mL syrup, Take 10 mL by mouth every 4 (four) hours as needed for cough, Disp: 118 mL, Rfl: 0    docusate sodium (COLACE) 100 mg capsule, Take 1 capsule (100 mg total) by mouth 2 (two) times a day, Disp: 10 capsule, Rfl: 0    DULoxetine (CYMBALTA) 60 mg delayed release capsule, Take 1 capsule (60 mg total) by mouth daily, Disp: 90 capsule, Rfl: 1    Flaxseed, Linseed, (EQL FLAX SEED OIL) 1000 MG CAPS, Take by mouth daily  , Disp: , Rfl:     fluticasone-umeclidinium-vilanterol (Trelegy Ellipta) 200-62 5-25 MCG/INH AEPB inhaler, Inhale 1 puff daily Rinse mouth after use , Disp: 180 blister, Rfl: 3    furosemide (LASIX) 20 mg tablet, Take 1 tablet (20 mg total) by mouth every evening, Disp: , Rfl: 0    furosemide (LASIX) 40 mg tablet, Take 1 tablet (40 mg total) by mouth daily, Disp: , Rfl: 0    oxyCODONE (ROXICODONE) 5 immediate release tablet, Take 1 tablets every 6 hrs as needed for pain control, Disp: 30 tablet, Rfl: 0    polyethylene glycol (MIRALAX) 17 g packet, Take 17 g by mouth daily as needed, Disp: , Rfl:     rivaroxaban (XARELTO) 15 mg tablet, Take 1 tablet (15 mg total) by mouth daily with breakfast (Patient taking differently: Take 20 mg by mouth daily at bedtime ), Disp: , Rfl:     senna (SENOKOT) 8 6 MG tablet, Take 1 tablet by mouth daily, Disp: , Rfl:     simvastatin (ZOCOR) 20 mg tablet, Take 1 tablet (20 mg total) by mouth daily at bedtime, Disp: 90 tablet, Rfl: 1    finasteride (PROSCAR) 5 mg tablet, Take 1 tablet (5 mg total) by mouth daily, Disp: 90 tablet, Rfl: 3    Status at time of discharge exam: Stable    Today's Visit: 2/18/202210:39 AM    Subjective:  No complaints or concerns reported    Review of systems:  As per review of present illness, and all other systems reviewed and negative     Vitals:  Weight 280 4 lb, blood pressure 126/71, heart rate 74, respiration 18, temperature 97 7°, O2 96% room air    Exam: Physical Exam  Vitals and nursing note reviewed  Constitutional:       General: He is not in acute distress  Appearance: He is obese  He is not ill-appearing, toxic-appearing or diaphoretic  HENT:      Head: Normocephalic and atraumatic  Nose: No congestion or rhinorrhea  Mouth/Throat:      Mouth: Mucous membranes are moist       Pharynx: Oropharynx is clear  Eyes:      General:         Right eye: No discharge  Left eye: No discharge  Conjunctiva/sclera: Conjunctivae normal    Cardiovascular:      Rate and Rhythm: Normal rate and regular rhythm  Pulses: Normal pulses  Heart sounds: Murmur heard  Comments: B/l LE vascular changes   Pulmonary:      Effort: Pulmonary effort is normal  No respiratory distress  Breath sounds: Normal breath sounds  No wheezing  Abdominal:      General: Bowel sounds are normal  There is no distension  Palpations: Abdomen is soft  Tenderness: There is no abdominal tenderness  There is no guarding  Musculoskeletal:         General: No swelling or tenderness  Cervical back: Normal range of motion and neck supple  No rigidity or tenderness  Right lower leg: Edema present  Left lower leg: Edema present  Comments: Moving all 4 extremities     Skin:     General: Skin is warm and dry  Coloration: Skin is not jaundiced  Findings: Bruising (Bilateral hands due to past IV drugs during prior hospitalization) present  Comments: Pressure ulcer on buttocks and excoriation   Neurological:      General: No focal deficit present  Mental Status: He is alert  Cranial Nerves: No cranial nerve deficit  Motor: Weakness (generalized ) present        Gait: Gait abnormal    Psychiatric:         Mood and Affect: Mood normal        Discussion with patient/family and further instructions:  -Fall precautions  -Medication list was reviewed    Follow-up Recommendations: Please follow-up with your primary care physician within 7-10 days of discharge to review medication changes and current status  Problem List Follow-up Recommendations:    I have spent >30 minutes with patient today in which greater than 50% of this time was spent in counseling/coordination of care regarding Diagnostic results, Prognosis, Risks and benefits of tx options, Intructions for management, Patient and family education, Importance of tx compliance, Risk factor reductions, Impressions and Continue to implement fall precautions at home, follow-up OT/PT recommendations home and follow up within 7-10 days with primary care provider  Dara Soulier PCP made aware of discharge summary via epic communications  This note was completed in part utilizing dynaTrace software direct voice recognition software  Grammatical errors, random word insertion, spelling mistakes, and incomplete sentences may be an occasional consequence of the system secondary to software limitations, ambient noise and hardware issues  At the time of dictation, efforts were made to edit, clarify and/or correct errors  Please read the chart carefully and recognize, using context, where substitutions have occurred  If you have any questions or concerns about the context, text or information contained within the body of this dictation, please contact myself, the provider, for further clarification      NAVA Torre  2/38/952845:08 AM

## 2022-02-19 PROBLEM — F32.A DEPRESSION: Status: ACTIVE | Noted: 2022-02-19

## 2022-02-19 RX ORDER — OXYCODONE HYDROCHLORIDE 5 MG/1
TABLET ORAL
Qty: 30 TABLET | Refills: 0 | Status: SHIPPED | OUTPATIENT
Start: 2022-02-19 | End: 2022-04-02

## 2022-02-19 RX ORDER — ACETAMINOPHEN 325 MG/1
650 TABLET ORAL EVERY 6 HOURS PRN
COMMUNITY
End: 2022-07-20

## 2022-02-19 NOTE — ASSESSMENT & PLAN NOTE
Status post acute blood loss anemia postop right femur IM nail  Most recent hemoglobin 8 5 and hematocrit 27 4, improving   No current signs of bleeding   Patient with history of CKD, had past transfusion  Will need to follow-up with PCP open discharge and may have repeat CBC

## 2022-02-19 NOTE — ASSESSMENT & PLAN NOTE
Currently stable no exacerbation during STR stay  Continue Trelegy and p r n  albuterol  Follow-up with your PCP on discharge

## 2022-02-19 NOTE — ASSESSMENT & PLAN NOTE
Continue PT/OT for strengthening and balance training   Continue to implement fall precaution   Continue to follow PT/OT recommendation for home discharge   Continue supportive care with ADLs   Encourage adequate p o  hydration, nutrition and supplement as needed

## 2022-02-19 NOTE — ASSESSMENT & PLAN NOTE
Lab Results   Component Value Date    EGFR 42 02/06/2022    EGFR 48 02/05/2022    EGFR 50 02/04/2022    CREATININE 1 51 (H) 02/06/2022    CREATININE 1 37 (H) 02/05/2022    CREATININE 1 31 (H) 02/04/2022   History of chronic hypertension  Monitor blood pressure with goal systolic blood pressure less than 160   Currently systolic blood pressure has been between 110s to high 120s  Will need to follow-up with cardiology and nephrology on discharge   And follow-up with PCP within 7-10 days on discharge

## 2022-02-19 NOTE — ASSESSMENT & PLAN NOTE
Heart rate log reviewed and stable, most recent heart rate 74   Heart rates have been trending between the 60s and 80s   Currently stable, denies any palpitation or chest pain  Continue Xarelto anticoagulation   Follow-up with cardiology on discharge

## 2022-02-19 NOTE — ASSESSMENT & PLAN NOTE
Wt Readings from Last 3 Encounters:   02/15/22 64 9 kg (143 lb)   02/11/22 65 kg (143 lb 6 4 oz)   02/09/22 125 kg (276 lb 9 6 oz)

## 2022-02-19 NOTE — ASSESSMENT & PLAN NOTE
Multifactorial  PT/OT following  Fall precautions   Continue with supportive care with ADLs   Encourage adequate p o  hydration and nutrition   Continue PT/OT for continued strength and balance training   Patient discharging to home today with family

## 2022-02-19 NOTE — ASSESSMENT & PLAN NOTE
Reports chronic bilateral leg pain   Chronic in nature and per patient it has been longstanding   Continue p r n  oxycodone for pain management   Continue PT/OT   Modification of lifestyle and weight lost may help with symptoms  Follow-up with PCP on discharge

## 2022-02-19 NOTE — ASSESSMENT & PLAN NOTE
Wt Readings from Last 3 Encounters:   02/15/22 64 9 kg (143 lb)   02/11/22 65 kg (143 lb 6 4 oz)   02/09/22 125 kg (276 lb 9 6 oz)   Weight log reviewed and stable, most recent weight 280 4 lb   No shortness of breath worsening edema noted  Continue Lasix  Encourage cardiac low-sodium diet   Continue daily weight   Follow-up with cardiology as recommended

## 2022-02-19 NOTE — ASSESSMENT & PLAN NOTE
Currently stable, no chest pain, palpitations or shortness of breast noted   Continue with aspirin and statin regimen   Follow-up with Cardiology on discharge

## 2022-02-19 NOTE — ASSESSMENT & PLAN NOTE
Chronic in nature  Duplex negative for acute and chronic thrombus, per patient this pain has been longstanding  Denies chest pain, dizziness, lightheadedness, palpitation, headache, left arm or jaw or back pain  Remains afebrile, appears nontoxic and hemodynamically stable   Lower extremity positive for neurovascular changes  Patient is obese  Continue Xarelto 50 mg daily, simvastatin 20 mg daily and aspirin 81 mg daily   Patient takes flaxseed oil capsule a 1000 mg daily  Follow-up with PCP on discharge

## 2022-02-19 NOTE — ASSESSMENT & PLAN NOTE
Status post surgical repair, IM nail next patient report having chronic pain bilateral lower extremity   Continue Xarelto for anticoagulation  Continue p r n  oxycodone for pain management   Continue PT/OT  Follow-up with orthopedics as recommended

## 2022-02-19 NOTE — ASSESSMENT & PLAN NOTE
May follow-up wound care as an outpatient   Continue to follow wound care recommendation with continue in daily wound care   Wound continue to be clean daily and dressing in place clean dry and intact  Positive for excoriation around the buttocks area, continue with daily care and continue to provide timely incontinence care  Follow-up with PCP on discharge for recommendation as an outpatient

## 2022-02-19 NOTE — ASSESSMENT & PLAN NOTE
Status post mechanical fall at home   Patient went to ER with right hip pain, underwent IM nailing of right femur   Continue to implement fall precaution  PT/OT for strengthening and balance training   Continue to evaluate medication regimen and prevent polypharmacy  Continue with supportive care with ADLs   Follow-up with PCP on discharge

## 2022-02-19 NOTE — ASSESSMENT & PLAN NOTE
Stable  No behaviors reported   Continue duloxetine ACL capsule delayed release particles 60mg  qdaily  follow-up with PCP

## 2022-02-19 NOTE — ASSESSMENT & PLAN NOTE
Patient reports chronic constipation, however had a bowel movement today   Currently on bowel regimen consisting of Colace 100 mg b i d , senna daily and MiraLax p r n     Encourage adequate p o  hydration and high-fiber nutrition  Continue to monitor at home   Follow-up with PCP on discharge

## 2022-02-19 NOTE — PROGRESS NOTES
[facility]  [address]  [phone]  DISCHARGE SUMMARY  POS: STR 31  Facility: [facility]    NAME: Pee Palacios  AGE: 80 y o  SEX: male  DATE OF ADMISSION: 2/7/22   DATE OF DISCHARGE:2/19/22 DISCHARGE DISPOSITION: Home    Reason for admission: Patient was admitted from [address] for rehabilitation after hospitalization for right hip injury after fall, dyspnea on exertion and ambulatory dysfunction  Admission Diagnoses: As mentioned above  Additional Problems:   Discharge Diagnoses: See problem list follow up recommendations below  Generalized weakness  Continue PT/OT for strengthening and balance training   Continue to implement fall precaution   Continue to follow PT/OT recommendation for home discharge   Continue supportive care with ADLs   Encourage adequate p o  hydration, nutrition and supplement as needed     Ambulatory dysfunction  Multifactorial  PT/OT following  Fall precautions   Continue with supportive care with ADLs   Encourage adequate p o  hydration and nutrition   Continue PT/OT for continued strength and balance training   Patient discharging to home today with family    Constipation  Patient reports chronic constipation, however had a bowel movement today   Currently on bowel regimen consisting of Colace 100 mg b i d , senna daily and MiraLax p r n     Encourage adequate p o  hydration and high-fiber nutrition  Continue to monitor at home   Follow-up with PCP on discharge    Acute blood loss anemia  Status post acute blood loss anemia postop right femur IM nail  Most recent hemoglobin 8 5 and hematocrit 27 4, improving   No current signs of bleeding   Patient with history of CKD, had past transfusion  Will need to follow-up with PCP open discharge and may have repeat CBC    Fall  Status post mechanical fall at home   Patient went to ER with right hip pain, underwent IM nailing of right femur   Continue to implement fall precaution  PT/OT for strengthening and balance training   Continue to evaluate medication regimen and prevent polypharmacy  Continue with supportive care with ADLs   Follow-up with PCP on discharge    Pain in both lower extremities  Chronic in nature  Duplex negative for acute and chronic thrombus, per patient this pain has been longstanding  Denies chest pain, dizziness, lightheadedness, palpitation, headache, left arm or jaw or back pain  Remains afebrile, appears nontoxic and hemodynamically stable   Lower extremity positive for neurovascular changes  Patient is obese  Continue Xarelto 50 mg daily, simvastatin 20 mg daily and aspirin 81 mg daily   Patient takes flaxseed oil capsule a 1000 mg daily  Follow-up with PCP on discharge    Chronic pain syndrome  Reports chronic bilateral leg pain   Chronic in nature and per patient it has been longstanding   Continue p r n  oxycodone for pain management   Continue PT/OT   Modification of lifestyle and weight lost may help with symptoms  Follow-up with PCP on discharge    Stage 3a chronic kidney disease ([facility] [address] )  Lab Results   Component Value Date    EGFR 42 02/06/2022    EGFR 48 02/05/2022    EGFR 50 02/04/2022    CREATININE 1 51 (H) 02/06/2022    CREATININE 1 37 (H) 02/05/2022    CREATININE 1 31 (H) 02/04/2022   Currently stable, most recent creatinine 1 0 and BUN of 23  Baseline creatinine reported is between 1 4 and 1 5, per records review   Avoid nephrotoxic medication  Avoid hypotension  Renal dose medication as needed  Encourage adequate p o  hydration   Follow-up with kidney care as recommended    Pressure injury of right buttock, unstageable ([facility] [address] )  May follow-up wound care as an outpatient   Continue to follow wound care recommendation with continue in daily wound care   Wound continue to be clean daily and dressing in place clean dry and intact  Positive for excoriation around the buttocks area, continue with daily care and continue to provide timely incontinence care  Follow-up with PCP on discharge for recommendation as an outpatient    Closed fracture of right femur ([facility] [address] )  Status post surgical repair, IM nail next patient report having chronic pain bilateral lower extremity   Continue Xarelto for anticoagulation  Continue p r n  oxycodone for pain management   Continue PT/OT  Follow-up with orthopedics as recommended    Chronic a-fib ([facility])  Heart rate log reviewed and stable, most recent heart rate 74   Heart rates have been trending between the 60s and 80s   Currently stable, denies any palpitation or chest pain  Continue Xarelto anticoagulation   Follow-up with cardiology on discharge    Chronic diastolic CHF (congestive heart failure) ([facility])  Wt Readings from Last 3 Encounters:   02/15/22 64 9 kg (143 lb)   02/11/22 65 kg (143 lb 6 4 oz)   02/09/22 125 kg (276 lb 9 6 oz)   Weight log reviewed and stable, most recent weight 280 4 lb   No shortness of breath worsening edema noted  Continue Lasix  Encourage cardiac low-sodium diet   Continue daily weight   Follow-up with cardiology as recommended     Acute on chronic diastolic heart failure ([facility])  Wt Readings from Last 3 Encounters:   02/15/22 64 9 kg (143 lb)   02/11/22 65 kg (143 lb 6 4 oz)   02/09/22 125 kg (276 lb 9 6 oz)     Benign hypertension with chronic kidney disease, stage III ([facility] [address] )  Lab Results   Component Value Date    EGFR 42 02/06/2022    EGFR 48 02/05/2022    EGFR 50 02/04/2022    CREATININE 1 51 (H) 02/06/2022    CREATININE 1 37 (H) 02/05/2022    CREATININE 1 31 (H) 02/04/2022   History of chronic hypertension  Monitor blood pressure with goal systolic blood pressure less than 160   Currently systolic blood pressure has been between 110s to high 120s  Will need to follow-up with cardiology and nephrology on discharge   And follow-up with PCP within 7-10 days on discharge    CAD (coronary artery disease)  Currently stable, no chest pain, palpitations or shortness of breast noted   Continue with aspirin and statin regimen   Follow-up with Cardiology on discharge    COPD (chronic obstructive pulmonary disease) ([address] )  Currently stable no exacerbation during STR stay  Continue Trelegy and p r n  albuterol  Follow-up with your PCP on discharge    Depression  Stable  No behaviors reported   Continue duloxetine ACL capsule delayed release particles 60mg  qdaily  follow-up with PCP    Course of stay: Patient was admitted to  [facility]   for rehabilitation following hospitalization  During recent hospitalization work up was negative for DVT  On Xarelto and ASA  + for constipation, miralax given  Generalized edema and vascular changes in b/l lower extremity  Status post right femur surgical repair  On 2/8/22 underwent  Right IM nailng femur antegrade- trochanteric  Reports eating and sleeping well  Weight stable  Recent lab work revealed BUN of 23, creatinine of 1 0, and all lytes within normal limits  Recent hemoglobin 8 5, hematocrit 27 4, WBC 7 5, and platelets 391  Denies chest pain, abdominal pain, lightheaded, dizziness, nausea, vomiting, fever, chills or urinary discomfort  During the resident's stay at [facility], he received a skilled nursing care, PT, OT, dietitian support, social service support and medical management  Patient is scheduled to be discharged home on 02/19/22  PT/OT recommendation for home discharge  Displaced intertrochanteric fracture of right femur, subsequent encounter for closed fracture per routine healing-pulmonary specialty program-minimum to moderate assist for bed mobility, contact guard for transferring, ambulating 60 ft with a rolling walker, contact guard, balance is fair-, independent for upper body dressing, moderate assist for lower body dressing, moderate to maximum assist for toileting  Returning home with wife and daughters  PT, OT, VNA at discharge    LOS 15 days    Labs and testing performed during stay:    BMP reviewed on 02/16/2022- blood glucose 133, BUN 23, creatinine 1 0, sodium 140, potassium 3 6, calcium 8 6, EGFR 63  CBC reviewed on 02/14/2022- hemoglobin 8 5, hematocrit 27 4, WBC 7 5, platelets 679    Discharge Medications: See discharge medication list which was reviewed in [facility] and compared to facility orders for accuracy  Current Outpatient Medications:     acetaminophen (TYLENOL) 325 mg tablet, Take 650 mg by mouth every 6 (six) hours as needed, Disp: , Rfl:     albuterol (Ventolin HFA) 90 mcg/act inhaler, Inhale 2 puffs every 6 (six) hours as needed for wheezing, Disp: 3 Inhaler, Rfl: 3    allopurinol (ZYLOPRIM) 100 mg tablet, Take 2 tablets (200 mg total) by mouth daily, Disp: 180 tablet, Rfl: 3    ascorbic acid (VITAMIN C) 500 mg tablet, Take 500 mg by mouth daily  , Disp: , Rfl:     aspirin 81 mg chewable tablet, Chew 1 tablet (81 mg total) daily, Disp: , Rfl: 0    B Complex Vitamins (B COMPLEX 1 PO), Take 1 tablet by mouth daily  , Disp: , Rfl:     Biotin (BIOTIN 5000) 5 MG CAPS, Take 1,000 mcg by mouth daily  , Disp: , Rfl:     calcium carbonate-vitamin D (OSCAL-D) 500 mg-200 units per tablet, Take 2 tablets by mouth daily at bedtime  , Disp: , Rfl:     cholecalciferol (VITAMIN D3) 1,000 units tablet, Take 1,000 Units by mouth daily, Disp: , Rfl:     Cranberry 1000 MG CAPS, Take 1 tablet by mouth 2 (two) times a day , Disp: , Rfl:     cyanocobalamin 1000 MCG tablet, Take 100 mcg by mouth daily, Disp: , Rfl:     dextromethorphan-guaiFENesin (ROBITUSSIN DM)  mg/5 mL syrup, Take 10 mL by mouth every 4 (four) hours as needed for cough, Disp: 118 mL, Rfl: 0    docusate sodium (COLACE) 100 mg capsule, Take 1 capsule (100 mg total) by mouth 2 (two) times a day, Disp: 10 capsule, Rfl: 0    DULoxetine (CYMBALTA) 60 mg delayed release capsule, Take 1 capsule (60 mg total) by mouth daily, Disp: 90 capsule, Rfl: 1    Flaxseed, Linseed, (EQL FLAX SEED OIL) 1000 MG CAPS, Take by mouth daily  , Disp: , Rfl:     fluticasone-umeclidinium-vilanterol (Trelegy Ellipta) 200-62 5-25 MCG/INH AEPB inhaler, Inhale 1 puff daily Rinse mouth after use , Disp: 180 blister, Rfl: 3    furosemide (LASIX) 20 mg tablet, Take 1 tablet (20 mg total) by mouth every evening, Disp: , Rfl: 0    furosemide (LASIX) 40 mg tablet, Take 1 tablet (40 mg total) by mouth daily, Disp: , Rfl: 0    oxyCODONE (ROXICODONE) 5 immediate release tablet, Take 1 tablets every 6 hrs as needed for pain control, Disp: 30 tablet, Rfl: 0    polyethylene glycol (MIRALAX) 17 g packet, Take 17 g by mouth daily as needed, Disp: , Rfl:     rivaroxaban (XARELTO) 15 mg tablet, Take 1 tablet (15 mg total) by mouth daily with breakfast (Patient taking differently: Take 20 mg by mouth daily at bedtime ), Disp: , Rfl:     senna (SENOKOT) 8 6 MG tablet, Take 1 tablet by mouth daily, Disp: , Rfl:     simvastatin (ZOCOR) 20 mg tablet, Take 1 tablet (20 mg total) by mouth daily at bedtime, Disp: 90 tablet, Rfl: 1    finasteride (PROSCAR) 5 mg tablet, Take 1 tablet (5 mg total) by mouth daily, Disp: 90 tablet, Rfl: 3    Status at time of discharge exam: Stable    Today's Visit: 2/18/202210:39 AM    Subjective:  No complaints or concerns reported    Review of systems:  As per review of present illness, and all other systems reviewed and negative     Vitals:  Weight 280 4 lb, blood pressure 126/71, heart rate 74, respiration 18, temperature 97 7°, O2 96% room air    Exam: Physical Exam  Vitals and nursing note reviewed  Constitutional:       General: He is not in acute distress  Appearance: He is obese  He is not ill-appearing, toxic-appearing or diaphoretic  HENT:      Head: Normocephalic and atraumatic  Nose: No congestion or rhinorrhea  Mouth/Throat:      Mouth: Mucous membranes are moist       Pharynx: Oropharynx is clear  Eyes:      General:         Right eye: No discharge  Left eye: No discharge        Conjunctiva/sclera: Conjunctivae normal    Cardiovascular:      Rate and Rhythm: Normal rate and regular rhythm  Pulses: Normal pulses  Heart sounds: Murmur heard  Comments: B/l LE vascular changes   Pulmonary:      Effort: Pulmonary effort is normal  No respiratory distress  Breath sounds: Normal breath sounds  No wheezing  Abdominal:      General: Bowel sounds are normal  There is no distension  Palpations: Abdomen is soft  Tenderness: There is no abdominal tenderness  There is no guarding  Musculoskeletal:         General: No swelling or tenderness  Cervical back: Normal range of motion and neck supple  No rigidity or tenderness  Right lower leg: Edema present  Left lower leg: Edema present  Comments: Moving all 4 extremities     Skin:     General: Skin is warm and dry  Coloration: Skin is not jaundiced  Findings: Bruising (Bilateral hands due to past IV drugs during prior hospitalization) present  Comments: Pressure ulcer on buttocks and excoriation   Neurological:      General: No focal deficit present  Mental Status: He is alert  Cranial Nerves: No cranial nerve deficit  Motor: Weakness (generalized ) present  Gait: Gait abnormal    Psychiatric:         Mood and Affect: Mood normal        Discussion with patient/family and further instructions:  -Fall precautions  -Medication list was reviewed    Follow-up Recommendations: Please follow-up with your primary care physician within 7-10 days of discharge to review medication changes and current status       Problem List Follow-up Recommendations:    I have spent >30 minutes with patient today in which greater than 50% of this time was spent in counseling/coordination of care regarding Diagnostic results, Prognosis, Risks and benefits of tx options, Intructions for management, Patient and family education, Importance of tx compliance, Risk factor reductions, Impressions and Continue to implement fall precautions at home, follow-up OT/PT recommendations home and follow up within 7-10 days with primary care provider  Gerhardt Sep PCP made aware of discharge summary via epic communications  This note was completed in part utilizing m-modal fluency direct voice recognition software  Grammatical errors, random word insertion, spelling mistakes, and incomplete sentences may be an occasional consequence of the system secondary to software limitations, ambient noise and hardware issues  At the time of dictation, efforts were made to edit, clarify and/or correct errors  Please read the chart carefully and recognize, using context, where substitutions have occurred  If you have any questions or concerns about the context, text or information contained within the body of this dictation, please contact myself, the provider, for further clarification      NAVA Marino  0/46/124057:31 AM

## 2022-02-19 NOTE — ASSESSMENT & PLAN NOTE
Lab Results   Component Value Date    EGFR 42 02/06/2022    EGFR 48 02/05/2022    EGFR 50 02/04/2022    CREATININE 1 51 (H) 02/06/2022    CREATININE 1 37 (H) 02/05/2022    CREATININE 1 31 (H) 02/04/2022   Currently stable, most recent creatinine 1 0 and BUN of 23  Baseline creatinine reported is between 1 4 and 1 5, per records review   Avoid nephrotoxic medication  Avoid hypotension  Renal dose medication as needed  Encourage adequate p o  hydration   Follow-up with kidney care as recommended

## 2022-02-21 ENCOUNTER — PATIENT OUTREACH (OUTPATIENT)
Dept: FAMILY MEDICINE CLINIC | Facility: CLINIC | Age: 82
End: 2022-02-21

## 2022-02-21 DIAGNOSIS — Z71.89 COMPLEX CARE COORDINATION: Primary | ICD-10-CM

## 2022-02-21 NOTE — PROGRESS NOTES
Michele notification received for new Complex Care-SHAWNEE pathway  Chart review completed  Outside records through [place] requested and refreshed  Patient hospitalized   Hossein Franco-2/7/22-2/19/22    Past Medical History  See problem list for complete list of medical diagnosis  Future appointments:  3/16/22-Ortho    CM contacted patient to introduce self, explain the role of the OP CM and purpose of this phone call is to assess patient's general wellbeing or for any assistance needed with follow-up care, medication costs, or financial difficulty  Explanation given regarding the free complex care management program  In addition, CM explained there would be a one time telephonic assessment to collect a medical and social history intake  CM added that outreach calls would be weekly/ biweekly/ monthly to ensure patient/caregiver is progressing  CM would communicate with other healthcare providers on the patient care team to ensure their care is coordinated  Patient and care giver daughter  consented to future outreach of CM  Daughter Juana Cobos reports her Dad is doing well since discharge  He is using a walker and ambulates well  Juana Cobos tells me [facility] was set up for RN/PT/OT  Juana Cobos requests [facility] as well  This CM recommends she express that need to VNA VN  Juana Cobos tells me they have all her Dad's medications prepared 6 weeks out in a pill organizer  She tells me there is strong family support for her Dad  This CM educated on nutrition, hydration, low sodium diet, daily weights, and F/U with PCP  Juana Cobos states there is a small wound right buttocks that the VNA VN will address  CM provided contact information and encouraged daughter to contact CM as needed  This CM ended bundle episode

## 2022-02-23 ENCOUNTER — OFFICE VISIT (OUTPATIENT)
Dept: FAMILY MEDICINE CLINIC | Facility: CLINIC | Age: 82
End: 2022-02-23
Payer: MEDICARE

## 2022-02-23 VITALS
WEIGHT: 277.1 LBS | HEIGHT: 73 IN | BODY MASS INDEX: 36.72 KG/M2 | RESPIRATION RATE: 20 BRPM | DIASTOLIC BLOOD PRESSURE: 68 MMHG | HEART RATE: 70 BPM | SYSTOLIC BLOOD PRESSURE: 128 MMHG | OXYGEN SATURATION: 98 % | TEMPERATURE: 99 F

## 2022-02-23 DIAGNOSIS — I25.10 CORONARY ARTERY DISEASE INVOLVING NATIVE CORONARY ARTERY OF NATIVE HEART WITHOUT ANGINA PECTORIS: Chronic | ICD-10-CM

## 2022-02-23 DIAGNOSIS — F41.1 GAD (GENERALIZED ANXIETY DISORDER): ICD-10-CM

## 2022-02-23 DIAGNOSIS — M79.605 PAIN IN BOTH LOWER EXTREMITIES: ICD-10-CM

## 2022-02-23 DIAGNOSIS — I10 ESSENTIAL HYPERTENSION: ICD-10-CM

## 2022-02-23 DIAGNOSIS — D64.9 ANEMIA, UNSPECIFIED TYPE: ICD-10-CM

## 2022-02-23 DIAGNOSIS — M79.604 PAIN IN BOTH LOWER EXTREMITIES: ICD-10-CM

## 2022-02-23 DIAGNOSIS — I25.10 CORONARY ARTERY ARTERIOSCLEROSIS: ICD-10-CM

## 2022-02-23 DIAGNOSIS — I50.32 CHRONIC DIASTOLIC CHF (CONGESTIVE HEART FAILURE) (HCC): ICD-10-CM

## 2022-02-23 DIAGNOSIS — G62.9 NEUROPATHY: Primary | ICD-10-CM

## 2022-02-23 DIAGNOSIS — I48.20 CHRONIC A-FIB (HCC): ICD-10-CM

## 2022-02-23 PROCEDURE — 99214 OFFICE O/P EST MOD 30 MIN: CPT | Performed by: FAMILY MEDICINE

## 2022-02-23 RX ORDER — BISACODYL 10 MG
10 SUPPOSITORY, RECTAL RECTAL DAILY
COMMUNITY
End: 2022-04-05

## 2022-02-23 RX ORDER — GABAPENTIN 100 MG/1
CAPSULE ORAL
COMMUNITY
Start: 2022-01-19 | End: 2022-02-23

## 2022-02-23 RX ORDER — ALBUTEROL SULFATE 2.5 MG/3ML
2.5 SOLUTION RESPIRATORY (INHALATION) EVERY 6 HOURS PRN
COMMUNITY

## 2022-02-23 RX ORDER — CLOPIDOGREL BISULFATE 75 MG/1
75 TABLET ORAL DAILY
Qty: 90 TABLET | Refills: 1 | Status: SHIPPED | OUTPATIENT
Start: 2022-02-23 | End: 2022-07-26 | Stop reason: SDUPTHER

## 2022-02-23 RX ORDER — DULOXETIN HYDROCHLORIDE 60 MG/1
60 CAPSULE, DELAYED RELEASE ORAL DAILY
Qty: 90 CAPSULE | Refills: 1 | Status: SHIPPED | OUTPATIENT
Start: 2022-02-23 | End: 2022-03-09

## 2022-02-23 RX ORDER — FELODIPINE 5 MG/1
5 TABLET, EXTENDED RELEASE ORAL DAILY
Qty: 90 TABLET | Refills: 1 | Status: SHIPPED | OUTPATIENT
Start: 2022-02-23

## 2022-02-23 NOTE — PROGRESS NOTES
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  VNA f/u  desitin and pressure avoidance    Hip pain, surg f/u with ortho and for any post op pain meds    Cardio f/u with Dr Lani Sears continued rehab for leg weakness, using wheelchair    Allopurinol per urology    Stay on duloxetine for now  Dc gabapentin     Diagnoses and all orders for this visit:    Neuropathy  -     DULoxetine (CYMBALTA) 60 mg delayed release capsule; Take 1 capsule (60 mg total) by mouth daily    Coronary artery arteriosclerosis  -     clopidogrel (Plavix) 75 mg tablet; Take 1 tablet (75 mg total) by mouth daily    JOO (generalized anxiety disorder)  -     DULoxetine (CYMBALTA) 60 mg delayed release capsule; Take 1 capsule (60 mg total) by mouth daily    Coronary artery disease involving native coronary artery of native heart without angina pectoris    Chronic diastolic CHF (congestive heart failure) ([facility])    Chronic a-fib (HCC)    Essential hypertension  -     felodipine (PLENDIL) 5 mg 24 hr tablet; Take 1 tablet (5 mg total) by mouth daily    Anemia, unspecified type  -     CBC and differential; Future  -     Iron Saturation %; Future    Pain in both lower extremities    Other orders  -     Discontinue: gabapentin (NEURONTIN) 100 mg capsule  -     Albuterol Sulfate (albuterol, FOR EMS ONLY,) (2 5 mg/3 mL) 0 083 % nebulizer solution; Take 2 5 mg by nebulization every 6 (six) hours as needed for wheezing  -     bisacodyl (Dulcolax) 10 mg suppository; Insert 10 mg into the rectum daily  -     Wound Dressings ([facility] Wound/Burn Dressing) GEL; Apply topically if needed Apply to right buttock as needed        Return if symptoms worsen or fail to improve  Subjective:      Patient ID: Jason Grant is a 80 y o  male  Chief Complaint   Patient presents with    Follow-up     f/u from rehab center nm  lpn       HPI    Was at [facility] rehab  Was on oxygen up to 10L/d but now off  Gained 13# in rehab  Not much rehab per pt  Right hip IM nail  Planning home PT  [place]  [place]    On oxycontin prn  Has f/u with ortho, seen last week  50 oz /d fluid restriction    Stopped gabapentin while in hospital  Less sleepy since off gabapentin    Been to neurology in past    On duloxetine 60mg    Low fat/salt diet again    The following portions of the patient's history were reviewed and updated as appropriate: allergies, current medications, past family history, past medical history, past social history, past surgical history and problem list     Review of Systems   Constitutional: Negative for chills and fever  Current Outpatient Medications   Medication Sig Dispense Refill    acetaminophen (TYLENOL) 325 mg tablet Take 650 mg by mouth every 6 (six) hours as needed      albuterol (Ventolin HFA) 90 mcg/act inhaler Inhale 2 puffs every 6 (six) hours as needed for wheezing 3 Inhaler 3    Albuterol Sulfate (albuterol, FOR EMS ONLY,) (2 5 mg/3 mL) 0 083 % nebulizer solution Take 2 5 mg by nebulization every 6 (six) hours as needed for wheezing      allopurinol (ZYLOPRIM) 100 mg tablet Take 2 tablets (200 mg total) by mouth daily 180 tablet 3    ascorbic acid (VITAMIN C) 500 mg tablet Take 500 mg by mouth daily   aspirin 81 mg chewable tablet Chew 1 tablet (81 mg total) daily  0    B Complex Vitamins (B COMPLEX 1 PO) Take 1 tablet by mouth daily   Biotin (BIOTIN 5000) 5 MG CAPS Take 1,000 mcg by mouth daily   bisacodyl (Dulcolax) 10 mg suppository Insert 10 mg into the rectum daily      calcium carbonate-vitamin D (OSCAL-D) 500 mg-200 units per tablet Take 2 tablets by mouth daily at bedtime   cholecalciferol (VITAMIN D3) 1,000 units tablet Take 1,000 Units by mouth daily      Cranberry 1000 MG CAPS Take 1 tablet by mouth 2 (two) times a day        cyanocobalamin 1000 MCG tablet Take 100 mcg by mouth daily      dextromethorphan-guaiFENesin (ROBITUSSIN DM)  mg/5 mL syrup Take 10 mL by mouth every 4 (four) hours as needed for cough 118 mL 0    docusate sodium (COLACE) 100 mg capsule Take 1 capsule (100 mg total) by mouth 2 (two) times a day 10 capsule 0    DULoxetine (CYMBALTA) 60 mg delayed release capsule Take 1 capsule (60 mg total) by mouth daily 90 capsule 1    finasteride (PROSCAR) 5 mg tablet Take 1 tablet (5 mg total) by mouth daily 90 tablet 3    Flaxseed, Linseed, (EQL FLAX SEED OIL) 1000 MG CAPS Take by mouth daily   fluticasone-umeclidinium-vilanterol (Trelegy Ellipta) 200-62 5-25 MCG/INH AEPB inhaler Inhale 1 puff daily Rinse mouth after use  180 blister 3    furosemide (LASIX) 20 mg tablet Take 1 tablet (20 mg total) by mouth every evening  0    furosemide (LASIX) 40 mg tablet Take 1 tablet (40 mg total) by mouth daily  0    oxyCODONE (ROXICODONE) 5 immediate release tablet Take 1 tablets every 6 hrs as needed for pain control 30 tablet 0    polyethylene glycol (MIRALAX) 17 g packet Take 17 g by mouth daily as needed      rivaroxaban (XARELTO) 15 mg tablet Take 1 tablet (15 mg total) by mouth daily with breakfast (Patient taking differently: Take 20 mg by mouth daily at bedtime )      senna (SENOKOT) 8 6 MG tablet Take 1 tablet by mouth daily      simvastatin (ZOCOR) 20 mg tablet Take 1 tablet (20 mg total) by mouth daily at bedtime 90 tablet 1    Wound Dressings ([facility] Wound/Burn Dressing) GEL Apply topically if needed Apply to right buttock as needed      clopidogrel (Plavix) 75 mg tablet Take 1 tablet (75 mg total) by mouth daily 90 tablet 1    felodipine (PLENDIL) 5 mg 24 hr tablet Take 1 tablet (5 mg total) by mouth daily 90 tablet 1     No current facility-administered medications for this visit         Objective:    /68   Pulse 70   Temp 99 °F (37 2 °C)   Resp 20   Ht 6' 1" (1 854 m)   Wt 126 kg (277 lb 1 6 oz) Comment: per patient  SpO2 98%   BMI 36 56 kg/m² Physical Exam  Vitals and nursing note reviewed  Constitutional:       General: He is not in acute distress  Appearance: He is well-developed  He is obese  He is not ill-appearing or diaphoretic  HENT:      Head: Normocephalic  Right Ear: Tympanic membrane and ear canal normal       Left Ear: Tympanic membrane and ear canal normal       Nose: No congestion  Mouth/Throat:      Mouth: Mucous membranes are dry  Pharynx: No oropharyngeal exudate  Eyes:      General: No scleral icterus  Conjunctiva/sclera: Conjunctivae normal    Cardiovascular:      Rate and Rhythm: Normal rate and regular rhythm  Pulmonary:      Effort: Pulmonary effort is normal  No respiratory distress  Breath sounds: No wheezing, rhonchi or rales  Abdominal:      Palpations: Abdomen is soft  Tenderness: There is no abdominal tenderness  Musculoskeletal:         General: No deformity  Cervical back: Neck supple  Right lower leg: Edema present  Left lower leg: Edema present  Skin:     General: Skin is warm and dry  Coloration: Skin is not jaundiced or pale  Neurological:      Mental Status: He is alert  Motor: Weakness present  Gait: Gait abnormal    Psychiatric:         Mood and Affect: Mood normal          Behavior: Behavior normal          Thought Content:  Thought content normal              Lulu Pierre DO

## 2022-02-23 NOTE — PROGRESS NOTES
Met with NE to discuss bundle patient  Patient progressing appropriately in therapy  D/C plan is to return home with spouse  LCD is 2/22/22

## 2022-02-24 ENCOUNTER — PATIENT OUTREACH (OUTPATIENT)
Dept: FAMILY MEDICINE CLINIC | Facility: CLINIC | Age: 82
End: 2022-02-24

## 2022-02-24 DIAGNOSIS — I12.9 BENIGN HYPERTENSION WITH CHRONIC KIDNEY DISEASE, STAGE III (HCC): Primary | ICD-10-CM

## 2022-02-24 DIAGNOSIS — N18.30 BENIGN HYPERTENSION WITH CHRONIC KIDNEY DISEASE, STAGE III (HCC): Primary | ICD-10-CM

## 2022-02-24 NOTE — PROGRESS NOTES
Call placed to daughter's cell phone  Unable to LM due to VM full  This CM placed call to patient  LM on VM for return call

## 2022-02-24 NOTE — PROGRESS NOTES
Received incoming call from daughter Julee Adhikari  Depatricio Adhikari reports her Dad is doing well but for the leg weakness which she feels has somewhat improved  She reports PT/OT are coming today  Julee Adhikari tells this CM her Dad wears compression boots 2 hours daily and Dr Deepika Marquez is not concerned with the slight swelling of B/L LE  She states he has a good appetite and the family provides a plant based diet  Her Dad adheres to a 60 oz fluid restriction, which fluid is measured from his foods as well  Julee Adhikari reports they have weaned her Dad off of Gabapentin as directed by the Ortho team  Wilnermary Zeynep denies her Dad has had constipation and that he takes Metamucil daily  This Cm informed Julee Adhikari the VN would be drawing a BMP in the home  Julee Adhikari thanks me for helping with that  All questions answered at this time

## 2022-02-24 NOTE — PROGRESS NOTES
Call placed to [facility]  Spoke to Luis Antonio Rosas  Aware patient needs BMP drawn at home visit  Luis Antonio Rosas requests this CM fax prescription to [phone]  This CM faxed BMP prescription to mentioned fax number

## 2022-03-02 ENCOUNTER — PATIENT OUTREACH (OUTPATIENT)
Dept: FAMILY MEDICINE CLINIC | Facility: CLINIC | Age: 82
End: 2022-03-02

## 2022-03-04 ENCOUNTER — TELEPHONE (OUTPATIENT)
Dept: OBGYN CLINIC | Facility: HOSPITAL | Age: 82
End: 2022-03-04

## 2022-03-04 ENCOUNTER — PATIENT OUTREACH (OUTPATIENT)
Dept: FAMILY MEDICINE CLINIC | Facility: CLINIC | Age: 82
End: 2022-03-04

## 2022-03-04 NOTE — PROGRESS NOTES
Call placed to patient  Ky Cannonper reports he's doing great, he's waiting for PT to come  Patient states he is eating plant-based food and he has lost 15 lbs since discharge  He reports he gets slightly out of breath, but it resolves with rest  Ky Young denies having chest pain or swelling of extremities  Patient requests Orthopedic Surgeon's office number  This CM provided Dr Rickey Richardson number([phone])  Ky Young tells this CM he wants to call to ask if he can drive  He adds his kids took his car keys, so he's stuck in his house  This CM notes Complex Care goals met  Episode closed and replaced with CM Surveillance

## 2022-03-04 NOTE — TELEPHONE ENCOUNTER
Patient has been advised of above  Understanding verbalized 
Patient would like to know if he can be released to drive? He would like a call at [phone]   Thank you
Yes

## 2022-03-09 DIAGNOSIS — G62.9 NEUROPATHY: ICD-10-CM

## 2022-03-09 DIAGNOSIS — F41.1 GAD (GENERALIZED ANXIETY DISORDER): ICD-10-CM

## 2022-03-09 RX ORDER — DULOXETIN HYDROCHLORIDE 60 MG/1
CAPSULE, DELAYED RELEASE ORAL
Qty: 90 CAPSULE | Refills: 1 | Status: SHIPPED | OUTPATIENT
Start: 2022-03-09 | End: 2022-07-20

## 2022-03-12 DIAGNOSIS — S72.91XA CLOSED FRACTURE OF RIGHT FEMUR, UNSPECIFIED FRACTURE MORPHOLOGY, INITIAL ENCOUNTER (HCC): ICD-10-CM

## 2022-03-13 RX ORDER — FUROSEMIDE 40 MG/1
TABLET ORAL
Qty: 135 TABLET | Refills: 2 | Status: SHIPPED | OUTPATIENT
Start: 2022-03-13 | End: 2022-07-21 | Stop reason: SDUPTHER

## 2022-03-14 ENCOUNTER — TELEPHONE (OUTPATIENT)
Dept: OBGYN CLINIC | Facility: HOSPITAL | Age: 82
End: 2022-03-14

## 2022-03-14 NOTE — TELEPHONE ENCOUNTER
Patient has a p/o appt with Umu Carpenter on Wed 3/16 and states he has no way of getting to the appt and wants to know if we can arrange transportation for him      His temp address:  [place] [address]

## 2022-03-15 NOTE — TELEPHONE ENCOUNTER
Patient sees Dr Kate Ramirez  Patient is calling to cancel the LYFT for tomorrow, his daughter is bringing him, but he would like a call on questions for future LYFT rides he state  CB # [id number], please listen to his prompts on phone

## 2022-03-16 ENCOUNTER — HOSPITAL ENCOUNTER (OUTPATIENT)
Dept: RADIOLOGY | Facility: HOSPITAL | Age: 82
Discharge: HOME | End: 2022-03-16
Payer: MEDICARE

## 2022-03-16 ENCOUNTER — OFFICE VISIT (OUTPATIENT)
Dept: OBGYN CLINIC | Facility: CLINIC | Age: 82
End: 2022-03-16
Payer: MEDICARE

## 2022-03-16 VITALS — HEIGHT: 73 IN | BODY MASS INDEX: 36.82 KG/M2 | WEIGHT: 277.78 LBS

## 2022-03-16 DIAGNOSIS — Z98.890 STATUS POST SURGERY: Primary | ICD-10-CM

## 2022-03-16 DIAGNOSIS — Z98.890 STATUS POST SURGERY: ICD-10-CM

## 2022-03-16 DIAGNOSIS — M17.12 PRIMARY OSTEOARTHRITIS OF LEFT KNEE: ICD-10-CM

## 2022-03-16 DIAGNOSIS — M17.11 PRIMARY OSTEOARTHRITIS OF RIGHT KNEE: ICD-10-CM

## 2022-03-16 PROCEDURE — 20610 DRAIN/INJ JOINT/BURSA W/O US: CPT | Performed by: PHYSICIAN ASSISTANT

## 2022-03-16 PROCEDURE — 99024 POSTOP FOLLOW-UP VISIT: CPT | Performed by: PHYSICIAN ASSISTANT

## 2022-03-16 PROCEDURE — 73502 X-RAY EXAM HIP UNI 2-3 VIEWS: CPT

## 2022-03-16 RX ORDER — HYALURONATE SODIUM 10 MG/ML
20 SYRINGE (ML) INTRAARTICULAR
Status: COMPLETED | OUTPATIENT
Start: 2022-03-16 | End: 2022-03-16

## 2022-03-16 RX ORDER — BUPIVACAINE HYDROCHLORIDE 2.5 MG/ML
2 INJECTION, SOLUTION INFILTRATION; PERINEURAL
Status: COMPLETED | OUTPATIENT
Start: 2022-03-16 | End: 2022-03-16

## 2022-03-16 RX ORDER — METHYLPREDNISOLONE ACETATE 40 MG/ML
2 INJECTION, SUSPENSION INTRA-ARTICULAR; INTRALESIONAL; INTRAMUSCULAR; SOFT TISSUE
Status: COMPLETED | OUTPATIENT
Start: 2022-03-16 | End: 2022-03-16

## 2022-03-16 RX ADMIN — METHYLPREDNISOLONE ACETATE 2 ML: 40 INJECTION, SUSPENSION INTRA-ARTICULAR; INTRALESIONAL; INTRAMUSCULAR; SOFT TISSUE at 11:02

## 2022-03-16 RX ADMIN — BUPIVACAINE HYDROCHLORIDE 2 ML: 2.5 INJECTION, SOLUTION INFILTRATION; PERINEURAL at 11:02

## 2022-03-16 RX ADMIN — Medication 20 MG: at 11:02

## 2022-03-16 NOTE — PROGRESS NOTES
80 y o male presents for 6 weeks postoperative visit status post right  IT femur fracture fixation  Patient states he is doing well with his physical therapy ambulate assistance of walking presents a wheelchair today  Patient presents with his daughter today  Patient complaining of bilateral aching pain is bilateral knees  Patient has been started on knee Euflexxa injection the left knee however did experience his fracture during a series of his right hip  Patient states the pain is aching nature localizes bilateral knees  Patient also complained of aching pain in his right knee  Patient has been diagnosed with osteoarthritis previously      Review of Systems  Review of systems negative unless otherwise specified in HPI    Past Medical History  Past Medical History:   Diagnosis Date    Arthritis     [facility] flutter [facility]     Chronic kidney disease     stage 3    Coronary artery disease     2 stents    Fluid retention     Gout     [facility]     pacemaker    Hypertension     Hyponatremia 4/8/2019    Pacemaker     Pulmonary emphysema ([facility] )     Radiculopathy     last assessed 1/28/16     Shortness of breath     exertion    Sleep apnea     c pap       Past Surgical History  Past Surgical History:   Procedure Laterality Date    ANGIOPLASTY      x2 2 stents and then replaced   [address]      pacemaker permanent placement dual chamber / last assessed 4/7/14 / implantation     CARDIAC SURGERY      pacemaker    CHOLECYSTECTOMY      CORONARY ANGIOPLASTY WITH STENT PLACEMENT      EPIDURAL BLOCK INJECTION N/A 5/26/2016    Procedure: BLOCK / INJECTION EPIDURAL STEROID LUMBAR  L4-5;  Surgeon: Jazmine Klein MD;  Location: [facility] MAIN OR;  Service:     EPIDURAL BLOCK INJECTION N/A 2/14/2019    Procedure: L4 L5 Lumbar Epidural Steroid Injection;  Surgeon: Jazmine Klein MD;  Location: [facility] MAIN OR;  Service: Pain Management     EYE SURGERY      cataract left    KNEE ARTHROSCOPY W/ MENISCAL REPAIR Left     LUMBAR EPIDURAL INJECTION N/A 3/17/2016    Procedure: BLOCK / INJECTION LUMBAR  L4-5  (C-ARM); Surgeon: Catherine Morales MD;  Location: [facility] MAIN OR;  Service:     TX OPEN RX FEMUR FX+INTRAMED ELLA Right 1/31/2022    Procedure: INSERTION NAIL IM FEMUR ANTEGRADE (TROCHANTERIC); Surgeon: Marie Dhaliwal MD;  Location: [facility] Main OR;  Service: Orthopedics       Current Medications  Current Outpatient Medications on File Prior to Visit   Medication Sig Dispense Refill    acetaminophen (TYLENOL) 325 mg tablet Take 650 mg by mouth every 6 (six) hours as needed      albuterol (Ventolin HFA) 90 mcg/act inhaler Inhale 2 puffs every 6 (six) hours as needed for wheezing 3 Inhaler 3    Albuterol Sulfate (albuterol, FOR EMS ONLY,) (2 5 mg/3 mL) 0 083 % nebulizer solution Take 2 5 mg by nebulization every 6 (six) hours as needed for wheezing      allopurinol (ZYLOPRIM) 100 mg tablet Take 2 tablets (200 mg total) by mouth daily 180 tablet 3    ascorbic acid (VITAMIN C) 500 mg tablet Take 500 mg by mouth daily   B Complex Vitamins (B COMPLEX 1 PO) Take 1 tablet by mouth daily   Biotin (BIOTIN 5000) 5 MG CAPS Take 1,000 mcg by mouth daily   bisacodyl (Dulcolax) 10 mg suppository Insert 10 mg into the rectum daily      calcium carbonate-vitamin D (OSCAL-D) 500 mg-200 units per tablet Take 2 tablets by mouth daily at bedtime   cholecalciferol (VITAMIN D3) 1,000 units tablet Take 1,000 Units by mouth daily      clopidogrel (Plavix) 75 mg tablet Take 1 tablet (75 mg total) by mouth daily 90 tablet 1    Cranberry 1000 MG CAPS Take 1 tablet by mouth 2 (two) times a day        cyanocobalamin 1000 MCG tablet Take 100 mcg by mouth daily      dextromethorphan-guaiFENesin (ROBITUSSIN DM)  mg/5 mL syrup Take 10 mL by mouth every 4 (four) hours as needed for cough 118 mL 0    docusate sodium (COLACE) 100 mg capsule Take 1 capsule (100 mg total) by mouth 2 (two) times a day 10 capsule 0    DULoxetine (CYMBALTA) 60 mg delayed release capsule TAKE ONE CAPSULE BY MOUTH EVERY DAY 90 capsule 1    felodipine (PLENDIL) 5 mg 24 hr tablet Take 1 tablet (5 mg total) by mouth daily 90 tablet 1    finasteride (PROSCAR) 5 mg tablet Take 1 tablet (5 mg total) by mouth daily 90 tablet 3    Flaxseed, Linseed, (EQL FLAX SEED OIL) 1000 MG CAPS Take by mouth daily   fluticasone-umeclidinium-vilanterol (Trelegy Ellipta) 200-62 5-25 MCG/INH AEPB inhaler Inhale 1 puff daily Rinse mouth after use  180 blister 3    furosemide (LASIX) 20 mg tablet Take 1 tablet (20 mg total) by mouth every evening  0    furosemide (LASIX) 40 mg tablet 1 TABLET BY MOUTH IN THE MORNING AND 1/2 TABLET IN THE EVENING 135 tablet 2    polyethylene glycol (MIRALAX) 17 g packet Take 17 g by mouth daily as needed      rivaroxaban (XARELTO) 15 mg tablet Take 1 tablet (15 mg total) by mouth daily with breakfast (Patient taking differently: Take 20 mg by mouth daily at bedtime )      senna (SENOKOT) 8 6 MG tablet Take 1 tablet by mouth daily      simvastatin (ZOCOR) 20 mg tablet Take 1 tablet (20 mg total) by mouth daily at bedtime 90 tablet 1    Wound Dressings ([facility] Wound/Burn Dressing) GEL Apply topically if needed Apply to right buttock as needed      aspirin 81 mg chewable tablet Chew 1 tablet (81 mg total) daily  0    oxyCODONE (ROXICODONE) 5 immediate release tablet Take 1 tablets every 6 hrs as needed for pain control (Patient not taking: Reported on 3/16/2022 ) 30 tablet 0     No current facility-administered medications on file prior to visit         Recent Labs [facility])  0   Lab Value Date/Time    HCT 26 0 (L) 02/06/2022 0525    HCT 45 9 01/07/2017 0834    HGB 7 9 (L) 02/06/2022 0525    HGB 15 3 01/07/2017 0834    WBC 12 92 (H) 02/06/2022 0525    WBC 8 40 03/20/2018 0000    INR 1 57 (H) 11/21/2021 1330    GLUCOSE 129 (H) 12/11/2017 1143    [facility] 6 2 (H) 01/29/2020 1138 Physical exam  · General: Awake, Alert, Oriented  · Eyes: Pupils equal, round and reactive to light  · Heart: regular rate and rhythm  · Lungs: No audible wheezing    bilateral Lower extremity  · On examination of the right knee there is no effusion, no erythema  Range of motion to 20° off of full active extension and flexion to greater than 120°  Pain on palpation medial and lateral joint lines  There is crepitus with range of motion, no warmth to palpation, bony enlargement noted  No pain on palpation pes anserine bursa region or distal iliotibial band  Stable to varus and valgus stress without pain or gapping  Negative anterior and posterior drawer testing  Sensation intact distal pulses present  · On examination of the left knee there is no effusion, no erythema  Range of motion to 15° off of full active extension and flexion to greater than 120°  Pain on palpation medial and lateral joint lines  There is crepitus with range of motion, no warmth to palpation, bony enlargement noted  No pain on palpation pes anserine bursa region or distal iliotibial band  Stable to varus and valgus stress without pain or gapping  Negative anterior and posterior drawer testing  Sensation intact distal pulses present    · On examination of the patient's right hip active flexion 90° no pain with internal-external rotation hip flexion adduction abduction strength 5/5 active ankle plantar flexion    Imaging  X-rays of the right hip reviewed x-rays reveal well-healing intertrochanteric fracture no evidence of hardware failure or cutout    Large joint arthrocentesis: R knee  Universal Protocol:  Consent given by: patient  Patient understanding: patient states understanding of the procedure being performed  Site marked: the operative site was marked  Patient identity confirmed: verbally with patient    Supporting Documentation  Indications: pain   Procedure Details  Location: knee - R knee  Needle size: 22 G  Approach: anterolateral  Medications administered: 2 mL bupivacaine 0 25 %; 2 mL methylPREDNISolone acetate 40 mg/mL    Patient tolerance: patient tolerated the procedure well with no immediate complications    Large joint arthrocentesis: L knee  Universal Protocol:  Consent given by: patient  Patient understanding: patient states understanding of the procedure being performed  Site marked: the operative site was marked  Patient identity confirmed: verbally with patient    Supporting Documentation  Indications: pain   Procedure Details  Location: knee - L knee  Needle size: 22 G  Approach: anterolateral  Medications administered: 20 mg Sodium Hyaluronate 20 MG/2ML    Patient tolerance: patient tolerated the procedure well with no immediate complications            Assessment:  Status post 6 weeks right hip intertrochanteric fracture intramedullary fixation   Bilateral knee pain due to osteoarthritis

## 2022-03-18 ENCOUNTER — PATIENT OUTREACH (OUTPATIENT)
Dept: FAMILY MEDICINE CLINIC | Facility: CLINIC | Age: 82
End: 2022-03-18

## 2022-03-18 NOTE — PROGRESS NOTES
Call placed to patient  Wife Tj Guerra answered phone  She reports Tong fell 2 weeks ago  He lost his footing and fell backwards  She states he didn't hurt himself and was seen by Orthopedic team and was cleared  She reports he has VN/PT/OT coming in the home and he's doing well with that  They come 3 times per week  He weighs himself daily and B/P has been great  All questions answered  Archie Yates thanks this CM for checking on her

## 2022-03-24 ENCOUNTER — OFFICE VISIT (OUTPATIENT)
Dept: PODIATRY | Facility: CLINIC | Age: 82
End: 2022-03-24
Payer: MEDICARE

## 2022-03-24 VITALS — RESPIRATION RATE: 16 BRPM | HEIGHT: 73 IN | BODY MASS INDEX: 36.71 KG/M2 | WEIGHT: 277 LBS

## 2022-03-24 DIAGNOSIS — M77.41 METATARSALGIA OF BOTH FEET: ICD-10-CM

## 2022-03-24 DIAGNOSIS — B35.1 ONYCHOMYCOSIS: ICD-10-CM

## 2022-03-24 DIAGNOSIS — B35.9 DERMATOPHYTOSIS: ICD-10-CM

## 2022-03-24 DIAGNOSIS — M77.42 METATARSALGIA OF BOTH FEET: ICD-10-CM

## 2022-03-24 DIAGNOSIS — M54.16 RADICULOPATHY OF LUMBAR REGION: Primary | ICD-10-CM

## 2022-03-24 DIAGNOSIS — I70.209 PERIPHERAL ARTERIOSCLEROSIS (HCC): ICD-10-CM

## 2022-03-24 DIAGNOSIS — R26.81 GAIT INSTABILITY: ICD-10-CM

## 2022-03-24 PROCEDURE — 99213 OFFICE O/P EST LOW 20 MIN: CPT | Performed by: PODIATRIST

## 2022-03-24 RX ORDER — DULOXETIN HYDROCHLORIDE 60 MG/1
60 CAPSULE, DELAYED RELEASE ORAL 2 TIMES DAILY
Qty: 60 CAPSULE | Refills: 1 | Status: SHIPPED | OUTPATIENT
Start: 2022-03-24 | End: 2022-07-21

## 2022-03-24 RX ORDER — DULOXETIN HYDROCHLORIDE 60 MG/1
60 CAPSULE, DELAYED RELEASE ORAL 2 TIMES DAILY
Qty: 60 CAPSULE | Refills: 1 | Status: SHIPPED | OUTPATIENT
Start: 2022-03-24 | End: 2022-03-24 | Stop reason: SDUPTHER

## 2022-03-24 RX ORDER — SPIRONOLACTONE 25 MG/1
25 TABLET ORAL DAILY
COMMUNITY

## 2022-03-24 RX ORDER — ACETAMINOPHEN 325 MG/1
650 TABLET ORAL EVERY 6 HOURS PRN
COMMUNITY
End: 2022-04-05

## 2022-03-24 NOTE — PROGRESS NOTES
Assessment/Plan:  Pain upon ambulation   Limb pain   Rule out radiculopathy   Edema secondary to venous insufficiency   Rule out degenerative disc disease   Probable spinal stenosis   Dermatophytosis   Mycosis of nail      Plan   Patient advised on condition   All nails debrided   Calluses debrided without pain or complication   Patient will follow up with pain management and acupuncture specialists   In addition all abnormal skin debrided without pain or complication  We will increase Cymbalta 60 mg to b i d       Subjective:  Patient complains of pain in his feet with ambulation   He has pain in his back and legs   He is doing well with acupuncture   He complains of pain with shoe wear   Patient is status post injury to right lower extremity  This required hospitalization  He is having increasing pain  He has pain in his leg and back    Patient is using walker                Past Medical History:   Diagnosis Date    Arthritis      Atrial flutter (HCC)      Chronic kidney disease       stage 3    Coronary artery disease       2 stents    Fluid retention      Gout      Heart failure (HCC)       pacemaker    Hypertension      Pacemaker      Pulmonary emphysema (HCC)      Radiculopathy       last assessed 1/28/16     Shortness of breath       exertion    Sleep apnea       c pap                   Past Surgical History:   Procedure Laterality Date    ANGIOPLASTY         x2 2 stents and then replaced    CARDIAC PACEMAKER PLACEMENT         pacemaker permanent placement dual chamber / last assessed 4/7/14 / implantation     CARDIAC SURGERY         pacemaker    CHOLECYSTECTOMY        CORONARY ANGIOPLASTY WITH STENT PLACEMENT        EPIDURAL BLOCK INJECTION N/A 5/26/2016     Procedure: BLOCK / INJECTION EPIDURAL STEROID LUMBAR  L4-5;  Surgeon: Bud Goodwin MD;  Location: [place] [address] MAIN OR;  Service:     EYE SURGERY         cataract left    KNEE ARTHROSCOPY W/ MENISCAL REPAIR Left      LUMBAR EPIDURAL INJECTION N/A 3/17/2016     Procedure: BLOCK / INJECTION LUMBAR  L4-5  (C-ARM);  Surgeon: Hilda Rowe MD;  Location: [place] [address] MAIN OR;  Service:          No Known Allergies        Current Outpatient Prescriptions:     albuterol (VENTOLIN HFA) 90 mcg/act inhaler, Inhale 2 puffs every 6 (six) hours as needed for wheezing, Disp: 1 Inhaler, Rfl: 6    allopurinol (ZYLOPRIM) 100 mg tablet, Take 1 tablet (100 mg total) by mouth daily, Disp: 90 tablet, Rfl: 3    ascorbic acid (VITAMIN C) 500 mg tablet, Take 500 mg by mouth daily  , Disp: , Rfl:     B Complex Vitamins (B COMPLEX 1 PO), Take 1 tablet by mouth daily  , Disp: , Rfl:     Biotin (BIOTIN 5000) 5 MG CAPS, Take 1,000 mcg by mouth daily  , Disp: , Rfl:     calcium carbonate-vitamin D (OSCAL-D) 500 mg-200 units per tablet, Take 2 tablets by mouth daily at bedtime  , Disp: , Rfl:     ciclopirox (LOPROX) 0 77 % cream, Apply topically 2 (two) times a day for 20 days, Disp: 45 g, Rfl: 2    clopidogrel (PLAVIX) 75 mg tablet, TAKE 1 TABLET DAILY, Disp: 90 tablet, Rfl: 3    collagenase (SANTYL) ointment, Apply 1 application topically daily  , Disp: , Rfl:     Cranberry 1000 MG CAPS, Take 1 tablet by mouth 2 (two) times a day , Disp: , Rfl:     cyanocobalamin 1000 MCG tablet, Take 100 mcg by mouth daily, Disp: , Rfl:     felodipine (PLENDIL) 5 mg 24 hr tablet, TAKE 1 TABLET DAILY, Disp: 90 tablet, Rfl: 3    finasteride (PROSCAR) 5 mg tablet, Take 1 tablet (5 mg total) by mouth daily, Disp: 90 tablet, Rfl: 3    Flaxseed, Linseed, (EQL FLAX SEED OIL) 1000 MG CAPS, Take by mouth daily  , Disp: , Rfl:     fluocinonide (LIDEX) 0 05 % cream, Apply topically 2 (two) times a day for 30 days, Disp: 30 g, Rfl: 2    fluticasone-salmeterol (ADVAIR) 250-50 mcg/dose inhaler, Inhale 1 puff 2 (two) times a day Rinse mouth after use , Disp: 1 Inhaler, Rfl: 6    furosemide (LASIX) 40 mg tablet, TAKE 1 TABLET BY MOUTH DAILY AS NEEDED FOR LEG EDEMA OR DYSNEA, Disp: 30 tablet, Rfl: 0    gabapentin (NEURONTIN) 300 mg capsule, TAKE 1 CAPSULE (300 MG TOTAL) BY MOUTH 2 (TWO) TIMES A DAY FOR 30 DAYS, Disp: 60 capsule, Rfl: 2    gabapentin (NEURONTIN) 300 mg capsule, Take 1 capsule (300 mg total) by mouth 3 (three) times a day for 30 days, Disp: 90 capsule, Rfl: 0    gabapentin (NEURONTIN) 600 MG tablet, Take 1 tablet (600 mg total) by mouth 3 (three) times a day for 30 days, Disp: 60 tablet, Rfl: 0    glucosamine-chondroitin 500-400 MG tablet, Take 1 tablet by mouth 2 (two) times a day , Disp: , Rfl:     hydrOXYzine HCL (ATARAX) 25 mg tablet, Take 1 tablet (25 mg total) by mouth 3 (three) times a day for 3 days, Disp: 9 tablet, Rfl: 0    lactase (LACTAID) 3,000 units tablet, Take 3,000 Units by mouth as needed  , Disp: , Rfl:     methylPREDNISolone 4 MG tablet therapy pack, Use as directed on package, Disp: 21 tablet, Rfl: 0    Omega-3 Fatty Acids (FISH OIL) 1,000 mg, Take 1,000 mg by mouth 2 (two) times a day , Disp: , Rfl:     oxyCODONE-acetaminophen (PERCOCET) 5-325 mg per tablet, 1 tablet to be taken 3 times daily as needed for leg pain, Disp: 30 tablet, Rfl: 0    PARoxetine (PAXIL) 20 mg tablet, TAKE 1/2 TABLET BY MOUTH DAILY, Disp: 30 tablet, Rfl: 0    psyllium (METAMUCIL) 58 6 % powder, Take 1 packet by mouth daily Indications: 1 tsp , Disp: , Rfl:     rivaroxaban (XARELTO) tablet, Take 20 mg by mouth , Disp: , Rfl:     simvastatin (ZOCOR) 20 mg tablet, Take 1 tablet (20 mg total) by mouth daily at bedtime, Disp: 90 tablet, Rfl: 3    traMADol (ULTRAM) 50 mg tablet, 1 tablet to be taken twice daily as needed for foot and leg pain, Disp: 30 tablet, Rfl: 0           Patient Active Problem List   Diagnosis    Chronic pain disorder    Bilateral lumbar radiculopathy    Lumbar canal stenosis    Acquired ankle/foot deformity    Pain in joints of both feet    Dermatophytosis    Atherosclerosis of arteries of extremities (HCC)    Pain in both feet    Onychomycosis    Neck muscle spasm    Anxiety 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    Benign hypertension with CKD (chronic kidney disease) stage III (HCC)    Bilateral leg edema    CKD (chronic kidney disease), stage III (HCC)    Microscopic hematuria    Proteinuria    Urinary frequency    Atrial fibrillation (HCC)    Benign prostatic hyperplasia    Congestive heart failure (HCC)    Arteriosclerosis of coronary artery    Allergic rhinitis    Aneurysm of abdominal aorta (HCC)    Angina pectoris (HCC)    Pain in joint involving ankle and foot    Atrial flutter (HCC)    Carpal tunnel syndrome    Chronic gout without tophus    Chronic low back pain    Chronic obstructive pulmonary disease (HCC)    Colon, diverticulosis    Deep venous insufficiency    Degenerative disc disease, lumbar    Difficulty in walking    Fecal soiling    Fibromyalgia    Fistula-in-ano    Hyperlipidemia    Insomnia, persistent    Memory loss    Moderate or severe vision impairment, one eye    Morbid obesity (HCC)    Nicotine dependence    Osteoarthritis of knee    Pacemaker    Sleep apnea    Urinary incontinence    Venous insufficiency (chronic) (peripheral)    BMI 45 0-49 9, adult ([facility])    Carbuncle of neck    Carbuncle of occipital region of scalp    Elevated glucose    Peripheral arteriosclerosis (HCC)    Radiculopathy of lumbar region    Metatarsalgia of both feet          Physical Exam  Left Foot: Appearance: Normal except as noted: excessive pronation-- and-- pes planus  Second toe deformities include hammer toe  Third toe deformities include hammer toe  Forth toe deformities include hammer toe  Fifth toe deformities include hammer toe  Tenderness: None except the great toe,-- distal first metatarsal,-- distal fifth metatarsal-- and-- insertion of the plantar fascia  Positive Brian sign third left intermetatarsal space  ROM: Full   Motor: Normal   Right Foot: Appearance: Normal except as noted: excessive pronation-- and-- pes planus  Second toe deformities include hammer toe  Third toe deformities include hammer toe  Forth toe deformities include hammer toe  Fifth toe deformities include hammer toe  Evaluation of the great toe nail demonstrates an ingrown nail  Tenderness: None except the insertion of the plantar fascia  Left Ankle: Appearance: Normal except erythema,-- swelling-- and-- swelling laterally  Tenderness: None except the tibiotalar joint  Right Ankle: ROM: Full  Neurological Exam: performed  Deep tendon reflexes: + tinel of right tibial nerve  Vascular Exam: performed Dorsalis pedis pulses were present bilaterally  Posterior tibial pulses were present bilaterally  Elevation Pallor: absent bilaterally  Capillary refill time was less than 1 second bilaterally  Edema: moderate bilaterally-- and-- 4/7 pitting  neg  buddy  Toenails: All of the toenails were elongated,-- hypertrophied,-- discolored,-- ingrown,-- shown to have subungual debris,-- tender-- and-- Mycotic with onychauxis  Hyperkeratosis: present on both first toes,-- present on both first sub metatarsals-- and-- present on both fifth sub metatarsals  Shoe Gear Evaluation: performed ()   Recommendation(s): extra depth diabetic shoes

## 2022-03-29 ENCOUNTER — PROCEDURE VISIT (OUTPATIENT)
Dept: OBGYN CLINIC | Facility: CLINIC | Age: 82
End: 2022-03-29
Payer: MEDICARE

## 2022-03-29 VITALS — HEIGHT: 73 IN | BODY MASS INDEX: 36.71 KG/M2 | WEIGHT: 277 LBS

## 2022-03-29 DIAGNOSIS — M17.12 PRIMARY OSTEOARTHRITIS OF LEFT KNEE: Primary | ICD-10-CM

## 2022-03-29 PROCEDURE — 20610 DRAIN/INJ JOINT/BURSA W/O US: CPT | Performed by: PHYSICIAN ASSISTANT

## 2022-03-29 RX ORDER — HYALURONATE SODIUM 10 MG/ML
20 SYRINGE (ML) INTRAARTICULAR
Status: COMPLETED | OUTPATIENT
Start: 2022-03-29 | End: 2022-03-29

## 2022-03-29 RX ADMIN — Medication 20 MG: at 09:29

## 2022-03-29 NOTE — PROGRESS NOTES
Orthopedics          Archana Blayne 80 y o  male MRN: [id number]      Chief Complaint:   left knee pain    HPI:   80 y o male complaining of left knee pain  Patient presents for his 3rd and final viscosupplementation injection  Patient states having some mild pain relief however continues to have pain in his left knee  He denies any radiation of pain denies any changes numbness tingling left lower extremity  Review Of Systems:   · Skin: Normal  · Neuro: See HPI  · Musculoskeletal: See HPI  · All other systems reviewed and are negative    Past Medical History:   Past Medical History:   Diagnosis Date    Arthritis     Atrial flutter ([facility] )     Chronic kidney disease     stage 3    Coronary artery disease     2 stents    Fluid retention     Gout     Heart failure ([facility] )     pacemaker    Hypertension     Hyponatremia 4/8/2019    Pacemaker     Pulmonary emphysema ([facility] )     Radiculopathy     last assessed 1/28/16     Shortness of breath     exertion    Sleep apnea     c pap       Past Surgical History:   Past Surgical History:   Procedure Laterality Date    ANGIOPLASTY      x2 2 stents and then replaced   [address]      pacemaker permanent placement dual chamber / last assessed 4/7/14 / implantation     CARDIAC SURGERY      pacemaker    CHOLECYSTECTOMY      CORONARY ANGIOPLASTY WITH STENT PLACEMENT      EPIDURAL BLOCK INJECTION N/A 5/26/2016    Procedure: BLOCK / INJECTION EPIDURAL STEROID LUMBAR  L4-5;  Surgeon: Lito Thomas MD;  Location: [facility] MAIN OR;  Service:     EPIDURAL BLOCK INJECTION N/A 2/14/2019    Procedure: L4 L5 Lumbar Epidural Steroid Injection;  Surgeon: Lito Thomas MD;  Location: [facility] MA[facility] OR;  Service: Pain Management     EYE SURGERY      cataract left    KNEE ARTHROSCOPY W/ MENISCAL REPAIR Left     LUMBAR EPIDURAL INJECTION N/A 3/17/2016    Procedure: BLOCK / INJECTION LUMBAR  L4-5  (C-ARM);   Surgeon: Lito Thomas MD;  [facility];  Service:     MA OPEN RX FEMUR FX+INTRAMED ELLA Right 1/31/2022    Procedure: INSERTION NAIL IM FEMUR ANTEGRADE (TROCHANTERIC); Surgeon: Misa Chairez MD;  [facility];  Service: Orthopedics       Family History:  Family history reviewed and non-contributory  Family History   Problem Relation Age of Onset    Cancer Mother 80    Heart disease Mother     Hypertension Mother     Heart disease Father     Diabetes Neg Hx     Stroke Neg Hx          Social History:  Social History     Socioeconomic History    Marital status: /Civil Union     Spouse name: Not on file    Number of children: Not on file    Years of education: Not on file    Highest education level: Not on file   Occupational History    Occupation: RETIRED   Tobacco Use    Smoking status: Current Every Day Smoker     Packs/day: 1 00     Years: 50 00     Pack years: 50 00     Types: Cigarettes     Last attempt to quit: 3/27/2019     Years since quitting: 3 0    Smokeless tobacco: Never Used    Tobacco comment: quit 02/14/2021   Vaping Use    Vaping Use: Former   Substance and Sexual Activity    Alcohol use: Never    Drug use: Never    Sexual activity: Not on file   Other Topics Concern    Not on file   Social History Narrative    Daily tea consumption 10 cups day      Social Determinants of Health     Financial Resource Strain: Not on file   Food Insecurity: No Food Insecurity    Worried About Running Out of Food in the Last Year: Never true    Clemente of Food in the Last Year: Never true   Transportation Needs: No Transportation Needs    Lack of Transportation (Medical): No    Lack of Transportation (Non-Medical):  No   Physical Activity: Not on file   Stress: Not on file   Social Connections: Not on file   Intimate Partner Violence: Not on file   Housing Stability: Unknown    Unable to Pay for Housing in the Last Year: No    Number of Places Lived in the Last Year: Not on file    Unstable Housing in the Last Year: No       Allergies:   No Known Allergies    Labs:  0   Lab Value Date/Time    HCT 26 0 (L) 02/06/2022 0525    HCT 25 2 (L) 02/05/2022 0457    HCT 25 7 (L) 02/04/2022 0436    HCT 45 9 01/07/2017 0834    HCT 43 8 07/16/2016 0752    HCT 41 0 04/07/2016 1141    HGB 7 9 (L) 02/06/2022 0525    HGB 7 5 (L) 02/05/2022 0457    HGB 7 5 (L) 02/04/2022 0436    HGB 15 3 01/07/2017 0834    HGB 14 9 07/16/2016 0752    HGB 13 4 04/07/2016 1141    INR 1 57 (H) 11/21/2021 1330    WBC 12 92 (H) 02/06/2022 0525    WBC 10 37 (H) 02/05/2022 0457    WBC 9 89 02/04/2022 0436    WBC 8 40 03/20/2018 0000    WBC 11-30 (A) 06/15/2017 1023    WBC 9 9 01/07/2017 0834    WBC 9 2 07/16/2016 0752       Meds:    Current Outpatient Medications:     acetaminophen (TYLENOL) 325 mg tablet, Take 650 mg by mouth every 6 (six) hours as needed, Disp: , Rfl:     acetaminophen (TYLENOL) 325 mg tablet, Take 650 mg by mouth every 6 (six) hours as needed for mild pain, Disp: , Rfl:     albuterol (Ventolin HFA) 90 mcg/act inhaler, Inhale 2 puffs every 6 (six) hours as needed for wheezing, Disp: 3 Inhaler, Rfl: 3    Albuterol Sulfate (albuterol, FOR EMS ONLY,) (2 5 mg/3 mL) 0 083 % nebulizer solution, Take 2 5 mg by nebulization every 6 (six) hours as needed for wheezing, Disp: , Rfl:     allopurinol (ZYLOPRIM) 100 mg tablet, Take 2 tablets (200 mg total) by mouth daily, Disp: 180 tablet, Rfl: 3    ascorbic acid (VITAMIN C) 500 mg tablet, Take 500 mg by mouth daily  , Disp: , Rfl:     aspirin 81 mg chewable tablet, Chew 1 tablet (81 mg total) daily, Disp: , Rfl: 0    B Complex Vitamins (B COMPLEX 1 PO), Take 1 tablet by mouth daily  , Disp: , Rfl:     Biotin (BIOTIN 5000) 5 MG CAPS, Take 1,000 mcg by mouth daily  , Disp: , Rfl:     bisacodyl (Dulcolax) 10 mg suppository, Insert 10 mg into the rectum daily, Disp: , Rfl:     calcium carbonate-vitamin D (OSCAL-D) 500 mg-200 units per tablet, Take 2 tablets by mouth daily at bedtime  , Disp: , Rfl:     cholecalciferol (VITAMIN D3) 1,000 units tablet, Take 1,000 Units by mouth daily, Disp: , Rfl:     clopidogrel (Plavix) 75 mg tablet, Take 1 tablet (75 mg total) by mouth daily, Disp: 90 tablet, Rfl: 1    Cranberry 1000 MG CAPS, Take 1 tablet by mouth 2 (two) times a day , Disp: , Rfl:     cyanocobalamin 1000 MCG tablet, Take 100 mcg by mouth daily, Disp: , Rfl:     Dapagliflozin Propanediol (Farxiga) 5 MG TABS, Take by mouth daily, Disp: , Rfl:     dextromethorphan-guaiFENesin (ROBITUSSIN DM)  mg/5 mL syrup, Take 10 mL by mouth every 4 (four) hours as needed for cough, Disp: 118 mL, Rfl: 0    docusate sodium (COLACE) 100 mg capsule, Take 1 capsule (100 mg total) by mouth 2 (two) times a day, Disp: 10 capsule, Rfl: 0    DULoxetine (CYMBALTA) 60 mg delayed release capsule, TAKE ONE CAPSULE BY MOUTH EVERY DAY, Disp: 90 capsule, Rfl: 1    DULoxetine (CYMBALTA) 60 mg delayed release capsule, Take 1 capsule (60 mg total) by mouth 2 (two) times a day, Disp: 60 capsule, Rfl: 1    felodipine (PLENDIL) 5 mg 24 hr tablet, Take 1 tablet (5 mg total) by mouth daily, Disp: 90 tablet, Rfl: 1    finasteride (PROSCAR) 5 mg tablet, Take 1 tablet (5 mg total) by mouth daily, Disp: 90 tablet, Rfl: 3    Flaxseed, Linseed, (EQL FLAX SEED OIL) 1000 MG CAPS, Take by mouth daily  , Disp: , Rfl:     fluticasone-umeclidinium-vilanterol (Trelegy Ellipta) 200-62 5-25 MCG/INH AEPB inhaler, Inhale 1 puff daily Rinse mouth after use , Disp: 180 blister, Rfl: 3    furosemide (LASIX) 20 mg tablet, Take 1 tablet (20 mg total) by mouth every evening, Disp: , Rfl: 0    furosemide (LASIX) 40 mg tablet, 1 TABLET BY MOUTH IN THE MORNING AND 1/2 TABLET IN THE EVENING, Disp: 135 tablet, Rfl: 2    oxyCODONE (ROXICODONE) 5 immediate release tablet, Take 1 tablets every 6 hrs as needed for pain control (Patient not taking: Reported on 3/16/2022 ), Disp: 30 tablet, Rfl: 0    polyethylene glycol (MIRALAX) 17 g packet, Take 17 g by mouth daily as needed, Disp: , Rfl:     rivaroxaban (XARELTO) 15 mg tablet, Take 1 tablet (15 mg total) by mouth daily with breakfast (Patient taking differently: Take 20 mg by mouth daily at bedtime ), Disp: , Rfl:     senna (SENOKOT) 8 6 MG tablet, Take 1 tablet by mouth daily, Disp: , Rfl:     simvastatin (ZOCOR) 20 mg tablet, Take 1 tablet (20 mg total) by mouth daily at bedtime, Disp: 90 tablet, Rfl: 1    spironolactone (ALDACTONE) 25 mg tablet, Take 25 mg by mouth daily, Disp: , Rfl:     Wound Dressings ([facility] Wound/Burn Dressing) GEL, Apply topically if needed Apply to right buttock as needed, Disp: , Rfl:       Physical Exam:   There were no vitals filed for this visit      General Appearance:    Alert, cooperative, no distress, appears stated age   Head:    Normocephalic, without obvious abnormality, atraumatic   Eyes:    conjunctiva/corneas clear, both eyes        Nose:   Nares normal, septum midline, no drainage    Throat:   Lips normal; teeth and gums normal   Neck:    symmetrical, trachea midline, ;     thyroid:  no enlargement/   Back:     Symmetric, no curvature, ROM normal   Lungs:   No audible wheezing or labored breathing   Chest Wall:    No tenderness or deformity    Heart:    Regular rate and rhythm               Pulses:   2+ and symmetric all extremities   Skin:   Skin color, texture, turgor normal, no rashes or lesions   Neurologic:   normal strength, sensation and reflexes     throughout       Musculoskeletal: left lower extremity  Examination patient's left knee anterior scar noted full active extension flexion greater than 110° pain palpation mediolateral joint line stable varus valgus stress and anterior-posterior drawer testing active extension flexion distal pulses present sensation intact    Large joint arthrocentesis: L knee  Universal Protocol:  Consent given by: patient  Patient understanding: patient states understanding of the procedure being performed  Site marked: the operative site was marked  Patient identity confirmed: verbally with patient    Supporting Documentation  Indications: pain   Procedure Details  Location: knee - L knee  Needle size: 22 G  Approach: superior  Medications administered: 20 mg Sodium Hyaluronate 20 MG/2ML    Patient tolerance: patient tolerated the procedure well with no immediate complications      Continue home    _*_*_*_*_*_*_*_*_*_*_*_*_*_*_*_*_*_*_*_*_*_*_*_*_*_*_*_*_*_*_*_*_*_*_*_*_*_*_*_*_*    Assessment:  81 y o male with left knee due to osteoarthritis    Plan:   · Weight bearing as tolerated  left lower extremity  · Left knee intra-articular 3 in series of 3 Euflexxa injections administered as noted above  · Advised no significant activity or increased ambulation over the next 24-48 hours and warm compresses to the knee no greater 20 minutes 3-4 times 24 hours following the injection  Patient advised should they develop any increasing pain, redness, drainage, numbness, tingling or swelling surrounding the injection sight, they are to contact our office or present to the emergency department    · Follow up as previously scheduled guarding patient's right hip, repeat x-rays right hip      Nadeem Casillas PA-C

## 2022-04-01 ENCOUNTER — PATIENT OUTREACH (OUTPATIENT)
Dept: FAMILY MEDICINE CLINIC | Facility: CLINIC | Age: 82
End: 2022-04-01

## 2022-04-01 NOTE — PROGRESS NOTES
Call placed to patient  LM on VM this CM will remove self from care team  CM surveillance episode closed for goals met per chart review

## 2022-04-02 ENCOUNTER — TELEPHONE (OUTPATIENT)
Dept: FAMILY MEDICINE CLINIC | Facility: CLINIC | Age: 82
End: 2022-04-02

## 2022-04-02 PROBLEM — S41.111A SKIN TEAR OF RIGHT UPPER ARM WITHOUT COMPLICATION: Status: RESOLVED | Noted: 2021-12-06 | Resolved: 2022-04-02

## 2022-04-02 PROBLEM — S30.0XXA CONTUSION, BUTTOCK, INITIAL ENCOUNTER: Status: RESOLVED | Noted: 2021-12-06 | Resolved: 2022-04-02

## 2022-04-02 PROBLEM — M62.81 GENERALIZED MUSCLE WEAKNESS: Status: ACTIVE | Noted: 2022-02-07

## 2022-04-02 PROBLEM — R06.2 WHEEZING: Status: RESOLVED | Noted: 2021-12-16 | Resolved: 2022-04-02

## 2022-04-02 PROBLEM — Z91.81 HISTORY OF FALLING: Status: ACTIVE | Noted: 2022-02-07

## 2022-04-02 PROBLEM — E11.22 TYPE 2 DIABETES MELLITUS WITH DIABETIC CHRONIC KIDNEY DISEASE (HCC): Status: ACTIVE | Noted: 2022-02-07

## 2022-04-02 PROBLEM — F33.9 MAJOR DEPRESSIVE DISORDER, RECURRENT, UNSPECIFIED (HCC): Status: ACTIVE | Noted: 2022-02-07

## 2022-04-02 PROBLEM — K59.00 CONSTIPATION: Status: RESOLVED | Noted: 2022-02-05 | Resolved: 2022-04-02

## 2022-04-02 PROBLEM — G89.4 CHRONIC PAIN DISORDER: Status: ACTIVE | Noted: 2022-02-07

## 2022-04-02 PROBLEM — H91.90 UNSPECIFIED HEARING LOSS, UNSPECIFIED EAR: Status: ACTIVE | Noted: 2022-02-07

## 2022-04-02 PROBLEM — I35.0 NONRHEUMATIC AORTIC (VALVE) STENOSIS: Status: ACTIVE | Noted: 2022-02-07

## 2022-04-02 PROBLEM — I50.33 ACUTE ON CHRONIC DIASTOLIC HEART FAILURE (HCC): Status: RESOLVED | Noted: 2021-11-21 | Resolved: 2022-04-02

## 2022-04-02 PROBLEM — R26.89 OTHER ABNORMALITIES OF GAIT AND MOBILITY: Status: ACTIVE | Noted: 2022-02-07

## 2022-04-02 PROBLEM — Z95.5 PRESENCE OF CORONARY ANGIOPLASTY IMPLANT AND GRAFT: Status: ACTIVE | Noted: 2022-02-07

## 2022-04-02 PROBLEM — K21.9 GASTRO-ESOPHAGEAL REFLUX DISEASE WITHOUT ESOPHAGITIS: Status: ACTIVE | Noted: 2022-02-07

## 2022-04-02 PROBLEM — G62.9 NEUROPATHY: Status: RESOLVED | Noted: 2021-08-17 | Resolved: 2022-04-02

## 2022-04-02 PROBLEM — D50.9 IRON DEFICIENCY ANEMIA: Status: ACTIVE | Noted: 2022-02-07

## 2022-04-02 PROBLEM — I27.22 PULMONARY HYPERTENSION DUE TO LEFT HEART DISEASE (HCC): Status: ACTIVE | Noted: 2022-02-07

## 2022-04-02 PROBLEM — G62.89 OTHER SPECIFIED POLYNEUROPATHIES: Status: ACTIVE | Noted: 2022-02-07

## 2022-04-02 PROBLEM — R13.14 DYSPHAGIA, PHARYNGOESOPHAGEAL PHASE: Status: ACTIVE | Noted: 2022-02-07

## 2022-04-03 NOTE — TELEPHONE ENCOUNTER
Has upcoming appt for AWV    Reason for appt should be 6 month follow-up     (but keep as 30 minutes to also do AWV)

## 2022-04-05 ENCOUNTER — OFFICE VISIT (OUTPATIENT)
Dept: FAMILY MEDICINE CLINIC | Facility: CLINIC | Age: 82
End: 2022-04-05
Payer: MEDICARE

## 2022-04-05 VITALS
HEART RATE: 70 BPM | TEMPERATURE: 96.6 F | BODY MASS INDEX: 36.55 KG/M2 | SYSTOLIC BLOOD PRESSURE: 126 MMHG | OXYGEN SATURATION: 99 % | DIASTOLIC BLOOD PRESSURE: 66 MMHG | RESPIRATION RATE: 17 BRPM | HEIGHT: 73 IN

## 2022-04-05 DIAGNOSIS — Z00.00 MEDICARE ANNUAL WELLNESS VISIT, SUBSEQUENT: Primary | ICD-10-CM

## 2022-04-05 DIAGNOSIS — D64.9 ANEMIA, UNSPECIFIED TYPE: ICD-10-CM

## 2022-04-05 DIAGNOSIS — I10 ESSENTIAL HYPERTENSION: ICD-10-CM

## 2022-04-05 DIAGNOSIS — K59.09 CHRONIC CONSTIPATION: ICD-10-CM

## 2022-04-05 DIAGNOSIS — M17.0 PRIMARY OSTEOARTHRITIS OF BOTH KNEES: ICD-10-CM

## 2022-04-05 PROBLEM — E11.22 TYPE 2 DIABETES MELLITUS WITH DIABETIC CHRONIC KIDNEY DISEASE (HCC): Status: RESOLVED | Noted: 2022-02-07 | Resolved: 2022-04-05

## 2022-04-05 PROCEDURE — 99214 OFFICE O/P EST MOD 30 MIN: CPT | Performed by: FAMILY MEDICINE

## 2022-04-05 PROCEDURE — G0438 PPPS, INITIAL VISIT: HCPCS | Performed by: FAMILY MEDICINE

## 2022-04-05 NOTE — PROGRESS NOTES
Assessment/Plan:    No problem-specific Assessment & Plan notes found for this encounter  Taking iron 1 pill/d currently  And a stool softener  Recheck iron and cbc  Last hgb 9 6    B/l knee pain  Daily  Feels weak  Wants to see an orthopedist for options  Understands will need cardiac clearance for surgery    Cardio f/u for chf and CAF  On plavix and NOAC from cardiologist who wants him on both    Gout stable    Chronic constipation despite otc, offer gi eval       Diagnoses and all orders for this visit:    Medicare annual wellness visit, subsequent    Primary osteoarthritis of both knees  -     Ambulatory referral to Orthopedic Surgery; Future    Chronic constipation  -     Ambulatory referral to Gastroenterology; Future    Anemia, unspecified type  -     CBC and differential; Future  -     Iron Saturation %; Future    Essential hypertension  -     Comprehensive metabolic panel; Future    Other orders  -     Ferrous Sulfate (IRON PO); Take by mouth        Return in about 6 months (around 10/5/2022) for Recheck  Subjective:      Patient ID: Dipesh Suarez is a 80 y o  male  Chief Complaint   Patient presents with    Follow-up     6 month f/u nm lpn       HPI  Weighs self daily  Follows diet given by children  Controls portions  Buttock sore gone  Hip is ok, no pain per pt  Anxiety seems controlled per pt  Sees Dr Betsey Cervantes, content  On xarelto and plavix  No bleeding noted but lots of bruising  hans on cpap, using it, still with daytime drowsiness  Does do some exercise at home as instructed by PT    The following portions of the patient's history were reviewed and updated as appropriate: allergies, current medications, past family history, past medical history, past social history, past surgical history and problem list     Review of Systems   Constitutional: Negative for chills and fever  Musculoskeletal: Positive for arthralgias           Current Outpatient Medications   Medication Sig Dispense Refill  acetaminophen (TYLENOL) 325 mg tablet Take 650 mg by mouth every 6 (six) hours as needed      albuterol (Ventolin HFA) 90 mcg/act inhaler Inhale 2 puffs every 6 (six) hours as needed for wheezing 3 Inhaler 3    Albuterol Sulfate (albuterol, FOR EMS ONLY,) (2 5 mg/3 mL) 0 083 % nebulizer solution Take 2 5 mg by nebulization every 6 (six) hours as needed for wheezing      allopurinol (ZYLOPRIM) 100 mg tablet Take 2 tablets (200 mg total) by mouth daily 180 tablet 3    ascorbic acid (VITAMIN C) 500 mg tablet Take 500 mg by mouth daily   B Complex Vitamins (B COMPLEX 1 PO) Take 1 tablet by mouth daily   Biotin (BIOTIN 5000) 5 MG CAPS Take 1,000 mcg by mouth daily   calcium carbonate-vitamin D (OSCAL-D) 500 mg-200 units per tablet Take 2 tablets by mouth daily at bedtime   cholecalciferol (VITAMIN D3) 1,000 units tablet Take 1,000 Units by mouth daily      clopidogrel (Plavix) 75 mg tablet Take 1 tablet (75 mg total) by mouth daily 90 tablet 1    Cranberry 1000 MG CAPS Take 1 tablet by mouth 2 (two) times a day   cyanocobalamin 1000 MCG tablet Take 100 mcg by mouth daily      Dapagliflozin Propanediol (Farxiga) 5 MG TABS Take by mouth daily      dextromethorphan-guaiFENesin (ROBITUSSIN DM)  mg/5 mL syrup Take 10 mL by mouth every 4 (four) hours as needed for cough 118 mL 0    docusate sodium (COLACE) 100 mg capsule Take 1 capsule (100 mg total) by mouth 2 (two) times a day 10 capsule 0    DULoxetine (CYMBALTA) 60 mg delayed release capsule Take 1 capsule (60 mg total) by mouth 2 (two) times a day 60 capsule 1    felodipine (PLENDIL) 5 mg 24 hr tablet Take 1 tablet (5 mg total) by mouth daily 90 tablet 1    Ferrous Sulfate (IRON PO) Take by mouth      finasteride (PROSCAR) 5 mg tablet Take 1 tablet (5 mg total) by mouth daily 90 tablet 3    Flaxseed, Linseed, (EQL FLAX SEED OIL) 1000 MG CAPS Take by mouth daily        fluticasone-umeclidinium-vilanterol (Trelegy Ellipta) 200-62 5-25 MCG/INH AEPB inhaler Inhale 1 puff daily Rinse mouth after use  180 blister 3    furosemide (LASIX) 20 mg tablet Take 1 tablet (20 mg total) by mouth every evening  0    furosemide (LASIX) 40 mg tablet 1 TABLET BY MOUTH IN THE MORNING AND 1/2 TABLET IN THE EVENING 135 tablet 2    polyethylene glycol (MIRALAX) 17 g packet Take 17 g by mouth daily as needed      rivaroxaban (XARELTO) 15 mg tablet Take 1 tablet (15 mg total) by mouth daily with breakfast (Patient taking differently: Take 20 mg by mouth daily at bedtime )      senna (SENOKOT) 8 6 MG tablet Take 1 tablet by mouth daily      simvastatin (ZOCOR) 20 mg tablet Take 1 tablet (20 mg total) by mouth daily at bedtime 90 tablet 1    spironolactone (ALDACTONE) 25 mg tablet Take 25 mg by mouth daily      Wound Dressings ([facility] Wound/Burn Dressing) GEL Apply topically if needed Apply to right buttock as needed      DULoxetine (CYMBALTA) 60 mg delayed release capsule TAKE ONE CAPSULE BY MOUTH EVERY DAY (Patient not taking: Reported on 4/5/2022) 90 capsule 1     No current facility-administered medications for this visit  Objective:    /66   Pulse 70   Temp (!) 96 6 °F (35 9 °C)   Resp 17   Ht 6' 1" (1 854 m)   SpO2 99%   BMI 36 55 kg/m²        Physical Exam  Vitals and nursing note reviewed  Constitutional:       General: He is not in acute distress  Appearance: He is well-developed  He is obese  He is not ill-appearing  HENT:      Head: Normocephalic  Right Ear: Tympanic membrane normal       Left Ear: Tympanic membrane normal    Eyes:      General: No scleral icterus  Conjunctiva/sclera: Conjunctivae normal    Cardiovascular:      Rate and Rhythm: Normal rate and regular rhythm  Pulmonary:      Effort: Pulmonary effort is normal  No respiratory distress  Breath sounds: No wheezing or rales  Abdominal:      General: There is no distension  Palpations: Abdomen is soft  Tenderness:  There is no abdominal tenderness  Musculoskeletal:         General: No deformity  Cervical back: Neck supple  No rigidity  Skin:     General: Skin is warm and dry  Coloration: Skin is not pale  Neurological:      Mental Status: He is alert  Gait: Gait abnormal    Psychiatric:         Mood and Affect: Mood normal          Behavior: Behavior normal          Thought Content:  Thought content normal                  Boo Dent DO

## 2022-04-06 NOTE — PATIENT INSTRUCTIONS
Medicare Preventive Visit Patient Instructions  Thank you for completing your Welcome to Medicare Visit or Medicare Annual Wellness Visit today  Your next wellness visit will be due in one year (4/6/2023)  The screening/preventive services that you may require over the next 5-10 years are detailed below  Some tests may not apply to you based off risk factors and/or age  Screening tests ordered at today's visit but not completed yet may show as past due  Also, please note that scanned in results may not display below  Preventive Screenings:  Service Recommendations Previous Testing/Comments   Colorectal Cancer Screening  · Colonoscopy    · Fecal Occult Blood Test (FOBT)/Fecal Immunochemical Test (FIT)  · Fecal DNA/Cologuard Test  · Flexible Sigmoidoscopy Age: 54-65 years old   Colonoscopy: every 10 years (May be performed more frequently if at higher risk)  OR  FOBT/FIT: every 1 year  OR  Cologuard: every 3 years  OR  Sigmoidoscopy: every 5 years  Screening may be recommended earlier than age 48 if at higher risk for colorectal cancer  Also, an individualized decision between you and your healthcare provider will decide whether screening between the ages of 74-80 would be appropriate   Colonoscopy: Not on file  FOBT/FIT: Not on file  Cologuard: Not on file  Sigmoidoscopy: Not on file    Screening Not Indicated     Prostate Cancer Screening Individualized decision between patient and health care provider in men between ages of 53-78   Medicare will cover every 12 months beginning on the day after your 50th birthday PSA: 0 1 ng/mL     Screening Not Indicated     Hepatitis C Screening Once for adults born between 1945 and 1965  More frequently in patients at high risk for Hepatitis C Hep C Antibody: Not on file    Screening Not Indicated   Diabetes Screening 1-2 times per year if you're at risk for diabetes or have pre-diabetes Fasting glucose: 157 mg/dL   A1C: 6 2 %    Screening Current   Cholesterol Screening Once every 5 years if you don't have a lipid disorder  May order more often based on risk factors  Lipid panel: 02/25/2021    Screening Not Indicated  History Lipid Disorder      Other Preventive Screenings Covered by Medicare:  1  Abdominal Aortic Aneurysm (AAA) Screening: covered once if your at risk  You're considered to be at risk if you have a family history of AAA or a male between the age of 73-68 who smoking at least 100 cigarettes in your lifetime  2  Lung Cancer Screening: covers low dose CT scan once per year if you meet all of the following conditions: (1) Age 50-69; (2) No signs or symptoms of lung cancer; (3) Current smoker or have quit smoking within the last 15 years; (4) You have a tobacco smoking history of at least 30 pack years (packs per day x number of years you smoked); (5) You get a written order from a healthcare provider  3  Glaucoma Screening: covered annually if you're considered high risk: (1) You have diabetes OR (2) Family history of glaucoma OR (3)  aged 48 and older OR (3)  American aged 72 and older  3  Osteoporosis Screening: covered every 2 years if you meet one of the following conditions: (1) Have a vertebral abnormality; (2) On glucocorticoid therapy for more than 3 months; (3) Have primary hyperparathyroidism; (4) On osteoporosis medications and need to assess response to drug therapy  5  HIV Screening: covered annually if you're between the age of 12-76  Also covered annually if you are younger than 13 and older than 72 with risk factors for HIV infection  For pregnant patients, it is covered up to 3 times per pregnancy      Immunizations:  Immunization Recommendations   Influenza Vaccine Annual influenza vaccination during flu season is recommended for all persons aged >= 6 months who do not have contraindications   Pneumococcal Vaccine (Prevnar and Pneumovax)  * Prevnar = PCV13  * Pneumovax = PPSV23 Adults 25-60 years old: 1-3 doses may be recommended based on certain risk factors  Adults 72 years old: Prevnar (PCV13) vaccine recommended followed by Pneumovax (PPSV23) vaccine  If already received PPSV23 since turning 65, then PCV13 recommended at least one year after PPSV23 dose  Hepatitis B Vaccine 3 dose series if at intermediate or high risk (ex: diabetes, end stage renal disease, liver disease)   Tetanus (Td) Vaccine - COST NOT COVERED BY MEDICARE PART B Following completion of primary series, a booster dose should be given every 10 years to maintain immunity against tetanus  Td may also be given as tetanus wound prophylaxis  Tdap Vaccine - COST NOT COVERED BY MEDICARE PART B Recommended at least once for all adults  For pregnant patients, recommended with each pregnancy  Shingles Vaccine (Shingrix) - COST NOT COVERED BY MEDICARE PART B  2 shot series recommended in those aged 48 and above     Health Maintenance Due:  There are no preventive care reminders to display for this patient  Immunizations Due:  There are no preventive care reminders to display for this patient  Advance Directives   What are advance directives? Advance directives are legal documents that state your wishes and plans for medical care  These plans are made ahead of time in case you lose your ability to make decisions for yourself  Advance directives can apply to any medical decision, such as the treatments you want, and if you want to donate organs  What are the types of advance directives? There are many types of advance directives, and each state has rules about how to use them  You may choose a combination of any of the following:  · Living will: This is a written record of the treatment you want  You can also choose which treatments you do not want, which to limit, and which to stop at a certain time  This includes surgery, medicine, IV fluid, and tube feedings  · Durable power of  for healthcare [facility]):   This is a written record that states who you want to make healthcare choices for you when you are unable to make them for yourself  This person, called a proxy, is usually a family member or a friend  You may choose more than 1 proxy  · Do not resuscitate (DNR) order:  A DNR order is used in case your heart stops beating or you stop breathing  It is a request not to have certain forms of treatment, such as CPR  A DNR order may be included in other types of advance directives  · Medical directive: This covers the care that you want if you are in a coma, near death, or unable to make decisions for yourself  You can list the treatments you want for each condition  Treatment may include pain medicine, surgery, blood transfusions, dialysis, IV or tube feedings, and a ventilator (breathing machine)  · Values history: This document has questions about your views, beliefs, and how you feel and think about life  This information can help others choose the care that you would choose  Why are advance directives important? An advance directive helps you control your care  Although spoken wishes may be used, it is better to have your wishes written down  Spoken wishes can be misunderstood, or not followed  Treatments may be given even if you do not want them  An advance directive may make it easier for your family to make difficult choices about your care  Cigarette Smoking and Your Health   Risks to your health if you smoke:  Nicotine and other chemicals found in tobacco damage every cell in your body  Even if you are a light smoker, you have an increased risk for cancer, heart disease, and lung disease  If you are pregnant or have diabetes, smoking increases your risk for complications  Benefits to your health if you stop smoking:   · You decrease respiratory symptoms such as coughing, wheezing, and shortness of breath  · You reduce your risk for cancers of the lung, mouth, throat, kidney, bladder, pancreas, stomach, and cervix   If you already have cancer, you increase the benefits of chemotherapy  You also reduce your risk for cancer returning or a second cancer from developing  · You reduce your risk for heart disease, blood clots, heart attack, and stroke  · You reduce your risk for lung infections, and diseases such as pneumonia, asthma, chronic bronchitis, and emphysema  · Your circulation improves  More oxygen can be delivered to your body  If you have diabetes, you lower your risk for complications, such as kidney, artery, and eye diseases  You also lower your risk for nerve damage  Nerve damage can lead to amputations, poor vision, and blindness  · You improve your body's ability to heal and to fight infections  For more information and support to stop smoking:   · "[employer]"  Phone: 3- 234 - [phone]  Web Address: [employer]  Weight Management   Why it is important to manage your weight:  Being overweight increases your risk of health conditions such as heart disease, high blood pressure, type 2 diabetes, and certain types of cancer  It can also increase your risk for osteoarthritis, sleep apnea, and other respiratory problems  Aim for a slow, steady weight loss  Even a small amount of weight loss can lower your risk of health problems  How to lose weight safely:  A safe and healthy way to lose weight is to eat fewer calories and get regular exercise  You can lose up about 1 pound a week by decreasing the number of calories you eat by 500 calories each day  Healthy meal plan for weight management:  A healthy meal plan includes a variety of foods, contains fewer calories, and helps you stay healthy  A healthy meal plan includes the following:  · Eat whole-grain foods more often  A healthy meal plan should contain fiber  Fiber is the part of grains, fruits, and vegetables that is not broken down by your body  Whole-grain foods are healthy and provide extra fiber in your diet   Some examples of whole-grain foods are whole-wheat breads and pastas, oatmeal, brown rice, and bulgur  · Eat a variety of vegetables every day  Include dark, leafy greens such as spinach, kale, arina greens, and mustard greens  Eat yellow and orange vegetables such as carrots, sweet potatoes, and winter squash  · Eat a variety of fruits every day  Choose fresh or canned fruit (canned in its own juice or light syrup) instead of juice  Fruit juice has very little or no fiber  · Eat low-fat dairy foods  Drink fat-free (skim) milk or 1% milk  Eat fat-free yogurt and low-fat cottage cheese  Try low-fat cheeses such as mozzarella and other reduced-fat cheeses  · Choose meat and other protein foods that are low in fat  Choose beans or other legumes such as split peas or lentils  Choose fish, skinless poultry (chicken or turkey), or lean cuts of red meat (beef or pork)  Before you cook meat or poultry, cut off any visible fat  · Use less fat and oil  Try baking foods instead of frying them  Add less fat, such as margarine, sour cream, regular salad dressing and mayonnaise to foods  Eat fewer high-fat foods  Some examples of high-fat foods include french fries, doughnuts, ice cream, and cakes  · Eat fewer sweets  Limit foods and drinks that are high in sugar  This includes candy, cookies, regular soda, and sweetened drinks  Exercise:  Exercise at least 30 minutes per day on most days of the week  Some examples of exercise include walking, biking, dancing, and swimming  You can also fit in more physical activity by taking the stairs instead of the elevator or parking farther away from stores  Ask your healthcare provider about the best exercise plan for you  © Copyright [employer] 2018 Information is for End User's use only and may not be sold, redistributed or otherwise used for commercial purposes   All illustrations and images included in CareNotes® are the copyrighted property of [employer]  or [address] [place] [address]

## 2022-04-06 NOTE — PROGRESS NOTES
Assessment and Plan:     Problem List Items Addressed This Visit        Active Problems    Chronic constipation    Relevant Orders    Ambulatory referral to Gastroenterology    Medicare annual wellness visit, subsequent - Primary    Essential hypertension    Relevant Orders    Comprehensive metabolic panel    Anemia    Relevant Medications    Ferrous Sulfate (IRON PO)    Other Relevant Orders    CBC and differential    Iron Saturation %    Primary osteoarthritis of both knees    Relevant Orders    Ambulatory referral to Orthopedic Surgery           Preventive health issues were discussed with patient, and age appropriate screening tests were ordered as noted in patient's After Visit Summary  Personalized health advice and appropriate referrals for health education or preventive services given if needed, as noted in patient's After Visit Summary       History of Present Illness:     Patient presents for Medicare Annual Wellness visit    Patient Care Team:  Merari Gomes DO as PCP - General (Family Medicine)  MD Tarsha Ramirez MD Shella Ponder, MD Roderic Gourd, MD Tom Rideau, DPM Candi Deacon, MD Princess Rosales, MD (Pain Medicine)     Problem List:     Patient Active Problem List   Diagnosis    Chronic pain syndrome    Bilateral lumbar radiculopathy    Lumbar canal stenosis    Pain in both lower extremities    JOO (generalized anxiety disorder)    Benign hypertension with chronic kidney disease, stage III (HCC)    Benign prostatic hyperplasia    Chronic diastolic CHF (congestive heart failure) ([address] )    Allergic rhinitis    Aneurysm of abdominal aorta (HCC)    Chronic a-fib ([address] )    Carpal tunnel syndrome    Chronic gout without tophus    Centrilobular emphysema ([address] )    Deep venous insufficiency    Difficulty in walking    Fecal soiling    Fibromyalgia    Hyperlipidemia    Moderate or severe vision impairment, one eye    Class 2 obesity due to excess calories without serious comorbidity with body mass index (BMI) of 36 0 to 36 9 in adult    Pacemaker    JOVI (obstructive sleep apnea)    Urinary incontinence    Venous insufficiency (chronic) (peripheral)    Peripheral arteriosclerosis (HCC)    Lumbar radiculopathy    Serum total bilirubin elevated    Stage 3a chronic kidney disease ([facility]    CAD (coronary artery disease)    Status post primary angioplasty with coronary stent    Mixed simple and mucopurulent chronic bronchitis (HCC)    Hyperuricemia    Bilateral leg edema    Nephrolithiasis    Former smoker    Anemia    Vitamin D insufficiency    Chronic constipation    Medicare annual wellness visit, subsequent    Idiopathic hematuria    Kidney stone on left side    Flank pain    Obesity (BMI 30-39  9)    Dyspnea on exertion    Coronary artery arteriosclerosis    Essential hypertension    Memory difficulty    COPD (chronic obstructive pulmonary disease) ([facility]    Fall    Closed fracture of right femur ([facility])    Acute blood loss anemia    Valvular heart disease    Pressure injury of right buttock, unstageable ([facility])    Ambulatory dysfunction    Generalized weakness    Depression    Dysphagia, pharyngoesophageal phase    Gastro-esophageal reflux disease without esophagitis    Generalized muscle weakness    History of falling    Iron deficiency anemia    Major depressive disorder, recurrent, unspecified ([facility])    Nonrheumatic aortic (valve) stenosis    Other abnormalities of gait and mobility    Other specified polyneuropathies    Pulmonary hypertension due to left heart disease ([facility])    Presence of coronary angioplasty implant and graft    Unspecified hearing loss, unspecified ear    Chronic pain disorder    Primary osteoarthritis of both knees      Past Medical and Surgical History:     Past Medical History:   Diagnosis Date    Arthritis     Atrial flutter (HCC)     Chronic kidney disease     stage 3    Coronary artery disease 2 stents    Fluid retention     Gout     Heart failure ([facility] )     pacemaker    Hypertension     Hyponatremia 4/8/2019    Pacemaker     Pulmonary emphysema ([facility] )     Radiculopathy     last assessed 1/28/16     Shortness of breath     exertion    Sleep apnea     c pap     Past Surgical History:   Procedure Laterality Date    ANGIOPLASTY      x2 2 stents and then replaced   [address]      pacemaker permanent placement dual chamber / last assessed 4/7/14 / implantation     CARDIAC SURGERY      pacemaker    CHOLECYSTECTOMY      CORONARY ANGIOPLASTY WITH STENT PLACEMENT      EPIDURAL BLOCK INJECTION N/A 5/26/2016    Procedure: BLOCK / INJECTION EPIDURAL STEROID LUMBAR  L4-5;  Surgeon: Dilshad Encarnacion MD;  Location: [facility] MAIN OR;  Service:     EPIDURAL BLOCK INJECTION N/A 2/14/2019    Procedure: L4 L5 Lumbar Epidural Steroid Injection;  Surgeon: Dilshad Encarnacion MD;  Location: [facility] MAIN OR;  Service: Pain Management     EYE SURGERY      cataract left    KNEE ARTHROSCOPY W/ MENISCAL REPAIR Left     LUMBAR EPIDURAL INJECTION N/A 3/17/2016    Procedure: BLOCK / INJECTION LUMBAR  L4-5  (C-ARM); Surgeon: Dilshad Encarnacion MD;  Location[facility]IN OR;  Service:     NM OPEN RX FEMUR FX+INTRAMED ELLA Right 1/31/2022    Procedure: INSERTION NAIL IM FEMUR ANTEGRADE (TROCHANTERIC);   Surgeon: Zoltan Dupont MD;  [facility];  Service: Orthopedics      Family History:     Family History   Problem Relation Age of Onset    Cancer Mother 80    Heart disease Mother     Hypertension Mother     Heart disease Father     Diabetes Neg Hx     Stroke Neg Hx       Social History:     Social History     Socioeconomic History    Marital status: /Civil Union     Spouse name: None    Number of children: None    Years of education: None    Highest education level: None   Occupational History    Occupation: RETIRED   Tobacco Use    Smoking status: Light Tobacco Smoker Packs/day: 1 00     Years: 50 00     Pack years: 50 00     Types: Cigarettes     Last attempt to quit: 3/27/2019     Years since quitting: 3 0    Smokeless tobacco: Never Used    Tobacco comment: quit 02/14/2021   Vaping Use    Vaping Use: Former   Substance and Sexual Activity    Alcohol use: Never    Drug use: Never    Sexual activity: None   Other Topics Concern    None   Social History Narrative    Daily tea consumption 10 cups day      Social Determinants of Health     Financial Resource Strain: Not on file   Food Insecurity: No Food Insecurity    Worried About Running Out of Food in the Last Year: Never true    Clemente of Food in the Last Year: Never true   Transportation Needs: No Transportation Needs    Lack of Transportation (Medical): No    Lack of Transportation (Non-Medical): No   Physical Activity: Not on file   Stress: Not on file   Social Connections: Not on file   Intimate Partner Violence: Not on file   Housing Stability: Unknown    Unable to Pay for Housing in the Last Year: No    Number of Places Lived in the Last Year: Not on file    Unstable Housing in the Last Year: No      Medications and Allergies:     Current Outpatient Medications   Medication Sig Dispense Refill    acetaminophen (TYLENOL) 325 mg tablet Take 650 mg by mouth every 6 (six) hours as needed      albuterol (Ventolin HFA) 90 mcg/act inhaler Inhale 2 puffs every 6 (six) hours as needed for wheezing 3 Inhaler 3    Albuterol Sulfate (albuterol, FOR EMS ONLY,) (2 5 mg/3 mL) 0 083 % nebulizer solution Take 2 5 mg by nebulization every 6 (six) hours as needed for wheezing      allopurinol (ZYLOPRIM) 100 mg tablet Take 2 tablets (200 mg total) by mouth daily 180 tablet 3    ascorbic acid (VITAMIN C) 500 mg tablet Take 500 mg by mouth daily   B Complex Vitamins (B COMPLEX 1 PO) Take 1 tablet by mouth daily   Biotin (BIOTIN 5000) 5 MG CAPS Take 1,000 mcg by mouth daily        calcium carbonate-vitamin D (OSCAL-D) 500 mg-200 units per tablet Take 2 tablets by mouth daily at bedtime   cholecalciferol (VITAMIN D3) 1,000 units tablet Take 1,000 Units by mouth daily      clopidogrel (Plavix) 75 mg tablet Take 1 tablet (75 mg total) by mouth daily 90 tablet 1    Cranberry 1000 MG CAPS Take 1 tablet by mouth 2 (two) times a day   cyanocobalamin 1000 MCG tablet Take 100 mcg by mouth daily      Dapagliflozin Propanediol (Farxiga) 5 MG TABS Take by mouth daily      dextromethorphan-guaiFENesin (ROBITUSSIN DM)  mg/5 mL syrup Take 10 mL by mouth every 4 (four) hours as needed for cough 118 mL 0    docusate sodium (COLACE) 100 mg capsule Take 1 capsule (100 mg total) by mouth 2 (two) times a day 10 capsule 0    DULoxetine (CYMBALTA) 60 mg delayed release capsule Take 1 capsule (60 mg total) by mouth 2 (two) times a day 60 capsule 1    felodipine (PLENDIL) 5 mg 24 hr tablet Take 1 tablet (5 mg total) by mouth daily 90 tablet 1    Ferrous Sulfate (IRON PO) Take by mouth      finasteride (PROSCAR) 5 mg tablet Take 1 tablet (5 mg total) by mouth daily 90 tablet 3    Flaxseed, Linseed, (EQL FLAX SEED OIL) 1000 MG CAPS Take by mouth daily   fluticasone-umeclidinium-vilanterol (Trelegy Ellipta) 200-62 5-25 MCG/INH AEPB inhaler Inhale 1 puff daily Rinse mouth after use   180 blister 3    furosemide (LASIX) 20 mg tablet Take 1 tablet (20 mg total) by mouth every evening  0    furosemide (LASIX) 40 mg tablet 1 TABLET BY MOUTH IN THE MORNING AND 1/2 TABLET IN THE EVENING 135 tablet 2    polyethylene glycol (MIRALAX) 17 g packet Take 17 g by mouth daily as needed      rivaroxaban (XARELTO) 15 mg tablet Take 1 tablet (15 mg total) by mouth daily with breakfast (Patient taking differently: Take 20 mg by mouth daily at bedtime )      senna (SENOKOT) 8 6 MG tablet Take 1 tablet by mouth daily      simvastatin (ZOCOR) 20 mg tablet Take 1 tablet (20 mg total) by mouth daily at bedtime 90 tablet 1    spironolactone (ALDACTONE) 25 mg tablet Take 25 mg by mouth daily      Wound Dressings (Medihoney Wound/Burn Dressing) GEL Apply topically if needed Apply to right buttock as needed      DULoxetine (CYMBALTA) 60 mg delayed release capsule TAKE ONE CAPSULE BY MOUTH EVERY DAY (Patient not taking: Reported on 4/5/2022) 90 capsule 1     No current facility-administered medications for this visit  No Known Allergies   Immunizations:     Immunization History   Administered Date(s) Administered    COVID-19 MODERNA VACC 0 5 ML IM 02/26/2021, 03/26/2021, 12/08/2021    Influenza Split High Dose Preservative Free IM 12/03/2013, 10/23/2015    Influenza, high dose seasonal 0 7 mL 10/01/2018, 10/10/2019, 09/21/2020, 09/17/2021    Influenza, seasonal, injectable 11/05/2001, 12/15/2009, 01/20/2011, 10/19/2011, 10/01/2012    Pneumococcal Conjugate 13-Valent 07/09/2019    Pneumococcal Polysaccharide PPV23 03/26/2013    Tdap 04/01/2016, 04/03/2016    Zoster 10/01/2012    Zoster Vaccine Recombinant 02/03/2020, 07/08/2020      Health Maintenance: There are no preventive care reminders to display for this patient  There are no preventive care reminders to display for this patient  Medicare Health Risk Assessment:     /66   Pulse 70   Temp (!) 96 6 °F (35 9 °C)   Resp 17   Ht 6' 1" (1 854 m)   SpO2 99%   BMI 36 55 kg/m²      Melita Quijano is here for his Subsequent Wellness visit  Last Medicare Wellness visit information reviewed, patient interviewed and updates made to the record today  Health Risk Assessment:   Patient rates overall health as good  Patient feels that their physical health rating is slightly worse  Patient is satisfied with their life  Eyesight was rated as same  Hearing was rated as same  Patient feels that their emotional and mental health rating is slightly better  Patients states they are often angry  Patient states they are often unusually tired/fatigued   Pain experienced in the last 7 days has been some  Patient's pain rating has been 6/10  Patient states that he has experienced no weight loss or gain in last 6 months  Depression Screening:   PHQ-9 Score: 0      Fall Risk Screening: In the past year, patient has experienced: no history of falling in past year      Home Safety:  Patient has trouble with stairs inside or outside of their home  Patient has working smoke alarms and has working carbon monoxide detector  Home safety hazards include: none  Nutrition:   Current diet is Low Saturated Fat, Low Cholesterol, Low Carb and No Added Salt  Medications:   Patient is currently taking over-the-counter supplements  OTC medications include: see medication list  Patient is able to manage medications  Activities of Daily Living (ADLs)/Instrumental Activities of Daily Living (IADLs):   Walk and transfer into and out of bed and chair?: Yes  Dress and groom yourself?: Yes    Bathe or shower yourself?: Yes    Feed yourself? Yes  Do your laundry/housekeeping?: Yes  Manage your money, pay your bills and track your expenses?: Yes  Make your own meals?: Yes    Do your own shopping?: Yes    Previous Hospitalizations:   Any hospitalizations or ED visits within the last 12 months?: Yes    How many hospitalizations have you had in the last year?: 1-2    Advance Care Planning:   Living will: Yes    Durable POA for healthcare:  Yes    Advanced directive: Yes    End of Life Decisions reviewed with patient: No      Cognitive Screening:   Provider or family/friend/caregiver concerned regarding cognition?: No    PREVENTIVE SCREENINGS      Cardiovascular Screening:    General: Screening Not Indicated and History Lipid Disorder      Diabetes Screening:     General: Screening Current      Colorectal Cancer Screening:     General: Screening Not Indicated      Prostate Cancer Screening:    General: Screening Not Indicated      Osteoporosis Screening:    General: Screening Not Indicated      Abdominal Aortic Aneurysm (AAA) Screening:    Risk factors include: tobacco use        General: Screening Not Indicated and History AAA      Lung Cancer Screening:     General: Screening Not Indicated      Hepatitis C Screening:    General: Screening Not Indicated    Hep C Screening Accepted: No     Screening, Brief Intervention, and Referral to Treatment (SBIRT)    Screening  Typical number of drinks in a day: 0  Typical number of drinks in a week: 0  Interpretation: Low risk drinking behavior  Single Item Drug Screening:  How often have you used an illegal drug (including marijuana) or a prescription medication for non-medical reasons in the past year? never    Single Item Drug Screen Score: 0  Interpretation: Negative screen for possible drug use disorder    Other Counseling Topics:   Car/seat belt/driving safety and regular weightbearing exercise         [place], DO

## 2022-04-19 ENCOUNTER — TELEPHONE (OUTPATIENT)
Dept: NEPHROLOGY | Facility: CLINIC | Age: 82
End: 2022-04-19

## 2022-04-20 ENCOUNTER — OFFICE VISIT (OUTPATIENT)
Dept: OBGYN CLINIC | Facility: CLINIC | Age: 82
End: 2022-04-20
Payer: MEDICARE

## 2022-04-20 VITALS
TEMPERATURE: 98.2 F | HEIGHT: 73 IN | WEIGHT: 264 LBS | HEART RATE: 69 BPM | BODY MASS INDEX: 34.99 KG/M2 | DIASTOLIC BLOOD PRESSURE: 74 MMHG | SYSTOLIC BLOOD PRESSURE: 128 MMHG

## 2022-04-20 DIAGNOSIS — M17.11 PRIMARY OSTEOARTHRITIS OF RIGHT KNEE: ICD-10-CM

## 2022-04-20 DIAGNOSIS — M17.12 PRIMARY OSTEOARTHRITIS OF LEFT KNEE: Primary | ICD-10-CM

## 2022-04-20 DIAGNOSIS — I10 ESSENTIAL HYPERTENSION: ICD-10-CM

## 2022-04-20 DIAGNOSIS — I38 VALVULAR HEART DISEASE: ICD-10-CM

## 2022-04-20 DIAGNOSIS — I27.22 PULMONARY HYPERTENSION DUE TO LEFT HEART DISEASE (HCC): ICD-10-CM

## 2022-04-20 DIAGNOSIS — M24.562 FLEXION CONTRACTURE OF LEFT KNEE: ICD-10-CM

## 2022-04-20 DIAGNOSIS — I70.209 PERIPHERAL ARTERIOSCLEROSIS (HCC): ICD-10-CM

## 2022-04-20 DIAGNOSIS — I87.2 VENOUS INSUFFICIENCY (CHRONIC) (PERIPHERAL): ICD-10-CM

## 2022-04-20 PROCEDURE — 99215 OFFICE O/P EST HI 40 MIN: CPT | Performed by: ORTHOPAEDIC SURGERY

## 2022-04-20 NOTE — PROGRESS NOTES
Assessment/Plan:  1  Primary osteoarthritis of left knee     2  Primary osteoarthritis of right knee     3  Flexion contracture of left knee     4  Pulmonary hypertension due to left heart disease ([address] )     5  Essential hypertension     6  Valvular heart disease     7  Peripheral arteriosclerosis ([address] )     8  Venous insufficiency (chronic) (peripheral)       Scribe Attestation    I,:  Jeffrey Wall am acting as a scribe while in the presence of the attending physician :       I,:  Miguel Lynch DO personally performed the services described in this documentation    as scribed in my presence :         [employer]" upon examination and review of his previous x-rays of his left right knees does demonstrate severe tricompartmental osteoarthritis  He does demonstrate flexion contractures bilaterally however is overall stable  I did have a lengthy discussion with him in regards to wrist benefits to undergoing a total knee arthroplasty  I did note that as he is relatively pain-free and is experiencing bouts of instability this is likely due to weakness due to fatigue secondary to flexion contracture bilaterally  I did note that he does have significant risk undergoing surgery due to his history of cardiopulmonary issues  I did also remark that due to the fact that he is a smoker he would have a high risk of wound healing issues  I did note that an individual such as him with the comorbidities that he presents with at his age also has constant risks of status post surgery secondary to the anesthesia  I do not believe he is a surgical candidate at this time and recommend continue non operative care with the use of a walking assistive devices such as his walker as well as physical therapy to strengthen his lower extremities  I did note that he could consider viscosupplementation injections if he does become symptomatic  Mckayla Cervantes verbalized understanding and was amenable to the treatment plan present the 2 today    I would like him to follow up with me in 3-4 months for repeat clinical evaluation  F/u 3-4 months    Subjective: New patient bilateral knee insatbilty    Patient ID: David Spencer is a very pleasant 80 y o  male presenting today for evaluation of his left right knees  He states that he is not experiencing any pain  However, notes that he is experiencing bouts of instability  He does have a known history of severe tricompartmental osteoarthritis that has been treated operatively steroid injection into right 2018 serial viscosupplementation injections into the left knee with the most recent injection being performed 3/29/2022  Did undergo a IM nail performed by Dr Dank Mai on 1/31/2022 and is recovering with home exercises that surgery  He notes he does ambulate with the assistance of a walker  However notes that he is experiencing buckling episodes that are problematic  He states his right knee is more symptomatic than the left  He does remark on multiple medical issues affecting his cardio vascular and pulmonary health  He states that of he is trying to recover from a leaky valve in order to have his daughter replaced in his pacemaker  He is currently a smoker  Today he denies any distal paresthesias  Review of Systems   Constitutional: Negative for chills, fever and unexpected weight change  HENT: Negative for hearing loss, nosebleeds and sore throat  Eyes: Negative for pain, redness and visual disturbance  Respiratory: Negative for cough, shortness of breath and wheezing  Cardiovascular: Negative for chest pain, palpitations and leg swelling  Gastrointestinal: Negative for abdominal pain, nausea and vomiting  Endocrine: Negative for polyphagia and polyuria  Genitourinary: Negative for dysuria and hematuria  Musculoskeletal: Positive for gait problem  See HPI   Skin: Negative for rash and wound  Neurological: Negative for dizziness, numbness and headaches  Psychiatric/Behavioral: Negative for decreased concentration and suicidal ideas  The patient is not nervous/anxious  Past Medical History:   Diagnosis Date    Arthritis     [facility] fl[facility]     Chronic kidney disease     stage 3    Coronary artery disease     2 stents    Fluid retention     Gout     Heart failure ([facility]     pacemaker    Hypertension     Hyponatremia 4/8/2019    Pacemaker     Pulmonary emphysema ([facility]     Radiculopathy     last assessed 1/28/16     Shortness of breath     exertion    Sleep apnea     c pap       Past Surgical History:   Procedure Laterality Date    ANGIOPLASTY      x2 2 stents and then replaced    CARDIAC PACEMAKER PLACEMENT      pacemaker permanent placement dual chamber / last assessed 4/7/14 / implantation     CARDIAC SURGERY      pacemaker    CHOLECYSTECTOMY      CORONARY ANGIOPLASTY WITH STENT PLACEMENT      EPIDURAL BLOCK INJECTION N/A 5/26/2016    Procedure: BLOCK / INJECTION EPIDURAL STEROID LUMBAR  L4-5;  Surgeon: Kaitlin Chirinos MD;  Location: [facility] MAIN OR;  Service:     EPIDURAL BLOCK INJECTION N/A 2/14/2019    Procedure: L4 L5 Lumbar Epidural Steroid Injection;  Surgeon: Kaitlin Chirinos MD;  Location: [facility] MAIN OR;  Service: Pain Management     EYE SURGERY      cataract left    HIP PINNING Left     KNEE ARTHROSCOPY W/ MENISCAL REPAIR Left     LUMBAR EPIDURAL INJECTION N/A 3/17/2016    Procedure: BLOCK / INJECTION LUMBAR  L4-5  (C-ARM); Surgeon: Kaitlin Chirinos MD;  Location: [facility] MAIN OR;  Service:     [place] OPEN RX FEMUR FX+INTRAMED ELLA Right 1/31/2022    Procedure: INSERTION NAIL IM FEMUR ANTEGRADE (TROCHANTERIC);   Surgeon: Paula Elliott MD;  [facility];  Service: Orthopedics       Family History   Problem Relation Age of Onset    Cancer Mother 80    Heart disease Mother     Hypertension Mother     Heart disease Father     Diabetes Neg Hx     Stroke Neg Hx        Social History     Occupational History    Occupation: RETIRED   Tobacco Use    Smoking status: Light Tobacco Smoker     Packs/day: 1 00     Years: 50 00     Pack years: 50 00     Types: Cigarettes     Last attempt to quit: 3/27/2019     Years since quitting: 3 0    Smokeless tobacco: Never Used    Tobacco comment: quit 02/14/2021   Vaping Use    Vaping Use: Former   Substance and Sexual Activity    Alcohol use: Never    Drug use: Never    Sexual activity: Not on file         Current Outpatient Medications:     acetaminophen (TYLENOL) 325 mg tablet, Take 650 mg by mouth every 6 (six) hours as needed, Disp: , Rfl:     albuterol (Ventolin HFA) 90 mcg/act inhaler, Inhale 2 puffs every 6 (six) hours as needed for wheezing, Disp: 3 Inhaler, Rfl: 3    Albuterol Sulfate (albuterol, FOR EMS ONLY,) (2 5 mg/3 mL) 0 083 % nebulizer solution, Take 2 5 mg by nebulization every 6 (six) hours as needed for wheezing, Disp: , Rfl:     allopurinol (ZYLOPRIM) 100 mg tablet, Take 2 tablets (200 mg total) by mouth daily, Disp: 180 tablet, Rfl: 3    ascorbic acid (VITAMIN C) 500 mg tablet, Take 500 mg by mouth daily  , Disp: , Rfl:     B Complex Vitamins (B COMPLEX 1 PO), Take 1 tablet by mouth daily  , Disp: , Rfl:     Biotin (BIOTIN 5000) 5 MG CAPS, Take 1,000 mcg by mouth daily  , Disp: , Rfl:     calcium carbonate-vitamin D (OSCAL-D) 500 mg-200 units per tablet, Take 2 tablets by mouth daily at bedtime  , Disp: , Rfl:     cholecalciferol (VITAMIN D3) 1,000 units tablet, Take 1,000 Units by mouth daily, Disp: , Rfl:     clopidogrel (Plavix) 75 mg tablet, Take 1 tablet (75 mg total) by mouth daily, Disp: 90 tablet, Rfl: 1    Cranberry 1000 MG CAPS, Take 1 tablet by mouth 2 (two) times a day , Disp: , Rfl:     cyanocobalamin 1000 MCG tablet, Take 100 mcg by mouth daily, Disp: , Rfl:     Dapagliflozin Propanediol (Farxiga) 5 MG TABS, Take by mouth daily, Disp: , Rfl:     dextromethorphan-guaiFENesin (ROBITUSSIN DM)  mg/5 mL syrup, Take 10 mL by mouth every 4 (four) hours as needed for cough, Disp: 118 mL, Rfl: 0    docusate sodium (COLACE) 100 mg capsule, Take 1 capsule (100 mg total) by mouth 2 (two) times a day, Disp: 10 capsule, Rfl: 0    DULoxetine (CYMBALTA) 60 mg delayed release capsule, TAKE ONE CAPSULE BY MOUTH EVERY DAY, Disp: 90 capsule, Rfl: 1    DULoxetine (CYMBALTA) 60 mg delayed release capsule, Take 1 capsule (60 mg total) by mouth 2 (two) times a day, Disp: 60 capsule, Rfl: 1    felodipine (PLENDIL) 5 mg 24 hr tablet, Take 1 tablet (5 mg total) by mouth daily, Disp: 90 tablet, Rfl: 1    Ferrous Sulfate (IRON PO), Take by mouth, Disp: , Rfl:     finasteride (PROSCAR) 5 mg tablet, Take 1 tablet (5 mg total) by mouth daily, Disp: 90 tablet, Rfl: 3    Flaxseed, Linseed, (EQL FLAX SEED OIL) 1000 MG CAPS, Take by mouth daily  , Disp: , Rfl:     fluticasone-umeclidinium-vilanterol (Trelegy Ellipta) 200-62 5-25 MCG/INH AEPB inhaler, Inhale 1 puff daily Rinse mouth after use , Disp: 180 blister, Rfl: 3    furosemide (LASIX) 20 mg tablet, Take 1 tablet (20 mg total) by mouth every evening, Disp: , Rfl: 0    furosemide (LASIX) 40 mg tablet, 1 TABLET BY MOUTH IN THE MORNING AND 1/2 TABLET IN THE EVENING, Disp: 135 tablet, Rfl: 2    polyethylene glycol (MIRALAX) 17 g packet, Take 17 g by mouth daily as needed, Disp: , Rfl:     rivaroxaban (XARELTO) 15 mg tablet, Take 1 tablet (15 mg total) by mouth daily with breakfast (Patient taking differently: Take 20 mg by mouth daily at bedtime ), Disp: , Rfl:     senna (SENOKOT) 8 6 MG tablet, Take 1 tablet by mouth daily, Disp: , Rfl:     simvastatin (ZOCOR) 20 mg tablet, Take 1 tablet (20 mg total) by mouth daily at bedtime, Disp: 90 tablet, Rfl: 1    spironolactone (ALDACTONE) 25 mg tablet, Take 25 mg by mouth daily, Disp: , Rfl:     Wound Dressings (Medihoney Wound/Burn Dressing) GEL, Apply topically if needed Apply to right buttock as needed, Disp: , Rfl:     No Known Allergies    Objective:  Vitals: 04/20/22 0831   BP: 128/74   Pulse: 69   Temp: 98 2 °F (36 8 °C)       Body mass index is 34 83 kg/m²  Right Knee Exam     Tenderness   The patient is experiencing tenderness in the medial joint line  Range of Motion   Extension: -10   Flexion: 110     Tests   Varus: negative Valgus: negative  Drawer:  Anterior - negative    Posterior - negative    Other   Erythema: absent  Scars: absent  Sensation: normal  Pulse: present  Effusion: no effusion present    Comments:  Vascular changes at the lower extremity      Left Knee Exam     Tenderness   The patient is experiencing tenderness in the medial joint line  Range of Motion   Left knee extension: -7    Flexion: 100     Tests   Varus: negative Valgus: negative  Drawer:  Anterior - negative     Posterior - negative    Other   Erythema: absent  Scars: present (anterior from previous surgery)  Sensation: normal  Pulse: present  Effusion: no effusion present    Comments:  Vascular changes at the lower extremity          Observations   Left Knee   Negative for effusion  Right Knee   Negative for effusion  Physical Exam  Vitals reviewed  HENT:      Head: Normocephalic and atraumatic  Eyes:      General:         Right eye: No discharge  Left eye: No discharge  Conjunctiva/sclera: Conjunctivae normal       Pupils: Pupils are equal, round, and reactive to light  Cardiovascular:      Rate and Rhythm: Normal rate  Pulmonary:      Effort: Pulmonary effort is normal  No respiratory distress  Musculoskeletal:      Cervical back: Normal range of motion and neck supple  Right knee: No effusion  Left knee: No effusion  Comments: As noted in HPI   Skin:     General: Skin is warm and dry  Neurological:      Mental Status: He is alert and oriented to person, place, and time  Psychiatric:         Mood and Affect: Mood normal          Behavior: Behavior normal          I have personally reviewed pertinent films in PACS       X-rays of the right knee from 2018 demonstrate severe tricompartmental osteoarthritis with subchondral sclerosis and osteophyte formation  No acute fractures demonstrated  No obvious lytic or blastic lesions demonstrated  X-rays of the left from 2018 demonstrates severe tricompartmental osteoarthritis with subchondral sclerosis and osteophyte formation  No acute fractures demonstrated  No obvious lytic or blastic lesions demonstrate  Surgical wire present anteriorly at the patella as well as posteriorly to the distal femur

## 2022-05-24 ENCOUNTER — HOSPITAL ENCOUNTER (OUTPATIENT)
Dept: RADIOLOGY | Facility: HOSPITAL | Age: 82
Discharge: HOME | End: 2022-05-24
Attending: ORTHOPAEDIC SURGERY
Payer: MEDICARE

## 2022-05-24 ENCOUNTER — OFFICE VISIT (OUTPATIENT)
Dept: OBGYN CLINIC | Facility: CLINIC | Age: 82
End: 2022-05-24
Payer: MEDICARE

## 2022-05-24 VITALS — BODY MASS INDEX: 34.83 KG/M2 | HEIGHT: 73 IN

## 2022-05-24 DIAGNOSIS — Z98.890 STATUS POST SURGERY: Primary | ICD-10-CM

## 2022-05-24 DIAGNOSIS — Z98.890 STATUS POST SURGERY: ICD-10-CM

## 2022-05-24 PROCEDURE — 99213 OFFICE O/P EST LOW 20 MIN: CPT | Performed by: ORTHOPAEDIC SURGERY

## 2022-05-24 PROCEDURE — 73502 X-RAY EXAM HIP UNI 2-3 VIEWS: CPT

## 2022-05-24 NOTE — PROGRESS NOTES
Assessment:   Diagnosis ICD-10-CM Associated Orders   1  Status post surgery  [id number] XR hip/pelv 2-3 vws right if performed     Referral to [address]:  · 80year-old male 4 months status post right intertrochanteric femur fracture fixation  · Patient and I discussed that the sensation he has of giving way about the legs is due to deconditioning  I would like him to begin physical therapy  We did order this as home health for him  They should initiate strengthening of his quadriceps and hip flexors  He should also focus on gait training  · He should continue to use his walker  To do next visit:  Return in 4 months (on 9/24/2022)  The above stated was discussed in layman's terms and the patient expressed understanding  All questions were answered to the patient's satisfaction  Scribe Attestation    I,:  Mohinder Golden am acting as a scribe while in the presence of the attending physician :       I,:  Caffie Meckel, MD personally performed the services described in this documentation    as scribed in my presence :             Subjective:   Elizabeth Merlos is a 80 y o  male who presents to the office today for follow-up evaluation status post right IT femur fracture fixation  Date of surgery 01/31/2022  Patient is about 4 months from surgery  He states he is using a walker  He does feel like his legs kemal when he stands  He notes no pain today about the hip  He denies any new injuries  He has not been in formal PT  Review of systems negative unless otherwise specified in HPI  Review of Systems   Constitutional: Negative for chills, fever and unexpected weight change  HENT: Negative for hearing loss, nosebleeds and sore throat  Eyes: Negative for pain, redness and visual disturbance  Respiratory: Negative for cough, shortness of breath and wheezing  Cardiovascular: Negative for chest pain, palpitations and leg swelling     Gastrointestinal: Negative for abdominal pain, nausea and vomiting  Endocrine: Negative for polydipsia and polyuria  Genitourinary: Negative for dysuria and hematuria  Musculoskeletal: Positive for arthralgias  Negative for joint swelling and myalgias  Skin: Negative for rash and wound  Neurological: Negative for dizziness, numbness and headaches  Psychiatric/Behavioral: Negative for agitation, decreased concentration and suicidal ideas  Past Medical History:   Diagnosis Date    Arthritis     [facility] flutter ([facility] )     Chronic kidney disease     stage 3    Coronary artery disease     2 stents    Fluid retention     Gout     Heart failure ([facility] )     pacemaker    Hypertension     Hyponatremia 4/8/2019    Pacemaker     Pulmonary emphysema ([facility] )     Radiculopathy     last assessed 1/28/16     Shortness of breath     exertion    Sleep apnea     c pap       Past Surgical History:   Procedure Laterality Date    ANGIOPLASTY      x2 2 stents and then replaced    CARDIAC PACEMAKER PLACEMENT      pacemaker permanent placement dual chamber / last assessed 4/7/14 / implantation     CARDIAC SURGERY      pacemaker    CHOLECYSTECTOMY      CORONARY ANGIOPLASTY WITH STENT PLACEMENT      EPIDURAL BLOCK INJECTION N/A 5/26/2016    Procedure: BLOCK / INJECTION EPIDURAL STEROID LUMBAR  L4-5;  Surgeon: Kaitlin Chirinos MD;  Location[facility] MAIN OR;  Service:     EPIDURAL BLOCK INJECTION N/A 2/14/2019    Procedure: L4 L5 Lumbar Epidural Steroid Injection;  Surgeon: Kaitlin Chirinos MD;  Location: [facility] MAIN OR;  Service: Pain Management     EYE SURGERY      cataract left    HIP PINNING Left     KNEE ARTHROSCOPY W/ MENISCAL REPAIR Left     LUMBAR EPIDURAL INJECTION N/A 3/17/2016    Procedure: BLOCK / INJECTION LUMBAR  L4-5  (C-ARM);   Surgeon: Kaitlin Chirinos MD;  Location: [facility] MAIN OR;  Service:     [place] OPEN RX FEMUR FX+INTRAMED ELLA Right 1/31/2022    Procedure: INSERTION NAIL IM FEMUR ANTEGRADE (TROCHANTERIC); Surgeon: Xavier Shah MD;  [facility];  Service: Orthopedics       Family History   Problem Relation Age of Onset    Cancer Mother 80    Heart disease Mother     Hypertension Mother     Heart disease Father     Diabetes Neg Hx     Stroke Neg Hx        Social History     Occupational History    Occupation: RETIRED   Tobacco Use    Smoking status: Light Tobacco Smoker     Packs/day: 1 00     Years: 50 00     Pack years: 50 00     Types: Cigarettes     Last attempt to quit: 3/27/2019     Years since quitting: 3 1    Smokeless tobacco: Never Used    Tobacco comment: quit 02/14/2021   Vaping Use    Vaping Use: Former   Substance and Sexual Activity    Alcohol use: Never    Drug use: Never    Sexual activity: Not on file         Current Outpatient Medications:     acetaminophen (TYLENOL) 325 mg tablet, Take 650 mg by mouth every 6 (six) hours as needed, Disp: , Rfl:     albuterol (Ventolin HFA) 90 mcg/act inhaler, Inhale 2 puffs every 6 (six) hours as needed for wheezing, Disp: 3 Inhaler, Rfl: 3    Albuterol Sulfate (albuterol, FOR EMS ONLY,) (2 5 mg/3 mL) 0 083 % nebulizer solution, Take 2 5 mg by nebulization every 6 (six) hours as needed for wheezing, Disp: , Rfl:     allopurinol (ZYLOPRIM) 100 mg tablet, Take 2 tablets (200 mg total) by mouth daily, Disp: 180 tablet, Rfl: 3    ascorbic acid (VITAMIN C) 500 mg tablet, Take 500 mg by mouth daily  , Disp: , Rfl:     B Complex Vitamins (B COMPLEX 1 PO), Take 1 tablet by mouth daily  , Disp: , Rfl:     Biotin 5 MG CAPS, Take 1,000 mcg by mouth daily  , Disp: , Rfl:     calcium carbonate-vitamin D (OSCAL-D) 500 mg-200 units per tablet, Take 2 tablets by mouth daily at bedtime  , Disp: , Rfl:     cholecalciferol (VITAMIN D3) 1,000 units tablet, Take 1,000 Units by mouth daily, Disp: , Rfl:     clopidogrel (Plavix) 75 mg tablet, Take 1 tablet (75 mg total) by mouth daily, Disp: 90 tablet, Rfl: 1    Cranberry 1000 MG CAPS, Take 1 tablet by mouth 2 (two) times a day , Disp: , Rfl:     cyanocobalamin 1000 MCG tablet, Take 100 mcg by mouth daily, Disp: , Rfl:     Dapagliflozin Propanediol 5 MG TABS, Take by mouth daily, Disp: , Rfl:     dextromethorphan-guaiFENesin (ROBITUSSIN DM)  mg/5 mL syrup, Take 10 mL by mouth every 4 (four) hours as needed for cough, Disp: 118 mL, Rfl: 0    docusate sodium (COLACE) 100 mg capsule, Take 1 capsule (100 mg total) by mouth 2 (two) times a day, Disp: 10 capsule, Rfl: 0    felodipine (PLENDIL) 5 mg 24 hr tablet, Take 1 tablet (5 mg total) by mouth daily, Disp: 90 tablet, Rfl: 1    Ferrous Sulfate (IRON PO), Take by mouth, Disp: , Rfl:     finasteride (PROSCAR) 5 mg tablet, Take 1 tablet (5 mg total) by mouth daily, Disp: 90 tablet, Rfl: 3    Flaxseed, Linseed, (EQL FLAX SEED OIL) 1000 MG CAPS, Take by mouth daily  , Disp: , Rfl:     fluticasone-umeclidinium-vilanterol (Trelegy Ellipta) 200-62 5-25 MCG/INH AEPB inhaler, Inhale 1 puff daily Rinse mouth after use , Disp: 180 blister, Rfl: 3    furosemide (LASIX) 20 mg tablet, Take 1 tablet (20 mg total) by mouth every evening, Disp: , Rfl: 0    furosemide (LASIX) 40 mg tablet, 1 TABLET BY MOUTH IN THE MORNING AND 1/2 TABLET IN THE EVENING, Disp: 135 tablet, Rfl: 2    ipratropium-albuterol (DUO-NEB) 0 5-2 5 mg/3 mL nebulizer solution, Take 3 mL by nebulization 4 (four) times a day, Disp: 360 mL, Rfl: 11    polyethylene glycol (MIRALAX) 17 g packet, Take 17 g by mouth daily as needed, Disp: , Rfl:     rivaroxaban (XARELTO) 15 mg tablet, Take 1 tablet (15 mg total) by mouth daily with breakfast (Patient taking differently: Take 20 mg by mouth daily at bedtime), Disp: , Rfl:     simvastatin (ZOCOR) 20 mg tablet, Take 1 tablet (20 mg total) by mouth daily at bedtime, Disp: 90 tablet, Rfl: 1    spironolactone (ALDACTONE) 25 mg tablet, Take 25 mg by mouth daily, Disp: , Rfl:     DULoxetine (CYMBALTA) 60 mg delayed release capsule, TAKE ONE CAPSULE BY MOUTH EVERY DAY, Disp: 90 capsule, Rfl: 1    DULoxetine (CYMBALTA) 60 mg delayed release capsule, Take 1 capsule (60 mg total) by mouth 2 (two) times a day, Disp: 60 capsule, Rfl: 1    senna (SENOKOT) 8 6 MG tablet, Take 1 tablet by mouth daily, Disp: , Rfl:     Wound Dressings (Medihoney Wound/Burn Dressing) GEL, Apply topically if needed Apply to right buttock as needed, Disp: , Rfl:     No Known Allergies       There were no vitals filed for this visit  Objective:                    Right Hip Exam     Tenderness   The patient is experiencing no tenderness  Range of Motion   The patient has normal right hip ROM  Other   Erythema: absent  Scars: present  Sensation: normal  Pulse: present    Comments:  Flexion contracture 20 degrees of the knee   negative [facility]   Full pain free passive motion             Diagnostics, reviewed and taken today if performed as documented: The attending physician has personally reviewed the pertinent films in PACS and interpretation is as follows:  X-rays of the right hip obtained on 5/24/22 demonstrate stable alignment of hardware with no signs or failure  Procedures, if performed today:    Procedures    None performed      Portions of the record may have been created with voice recognition software  Occasional wrong word or "sound a like" substitutions may have occurred due to the inherent limitations of voice recognition software  Read the chart carefully and recognize, using context, where substitutions have occurred

## 2022-05-26 ENCOUNTER — TELEPHONE (OUTPATIENT)
Dept: OBGYN CLINIC | Facility: HOSPITAL | Age: 82
End: 2022-05-26

## 2022-05-26 ENCOUNTER — OFFICE VISIT (OUTPATIENT)
Dept: PODIATRY | Facility: CLINIC | Age: 82
End: 2022-05-26
Payer: MEDICARE

## 2022-05-26 VITALS
BODY MASS INDEX: 34.33 KG/M2 | HEIGHT: 73 IN | SYSTOLIC BLOOD PRESSURE: 142 MMHG | DIASTOLIC BLOOD PRESSURE: 53 MMHG | HEART RATE: 71 BPM | WEIGHT: 259 LBS

## 2022-05-26 DIAGNOSIS — I70.209 PERIPHERAL ARTERIOSCLEROSIS (HCC): ICD-10-CM

## 2022-05-26 DIAGNOSIS — R26.81 GAIT INSTABILITY: ICD-10-CM

## 2022-05-26 DIAGNOSIS — B35.9 DERMATOPHYTOSIS: ICD-10-CM

## 2022-05-26 DIAGNOSIS — M77.42 METATARSALGIA OF BOTH FEET: Primary | ICD-10-CM

## 2022-05-26 DIAGNOSIS — M77.41 METATARSALGIA OF BOTH FEET: Primary | ICD-10-CM

## 2022-05-26 DIAGNOSIS — M54.16 RADICULOPATHY OF LUMBAR REGION: ICD-10-CM

## 2022-05-26 DIAGNOSIS — B35.1 ONYCHOMYCOSIS: ICD-10-CM

## 2022-05-26 PROCEDURE — 99213 OFFICE O/P EST LOW 20 MIN: CPT | Performed by: PODIATRIST

## 2022-05-26 PROCEDURE — 87086 URINE CULTURE/COLONY COUNT: CPT | Performed by: PREVENTIVE MEDICINE

## 2022-05-26 NOTE — PROGRESS NOTES
Assessment/Plan:  Pain upon ambulation   Limb pain   Rule out radiculopathy   Edema secondary to venous insufficiency   Rule out degenerative disc disease   Probable spinal stenosis   Dermatophytosis   Mycosis of nail      Plan   Patient advised on condition   All nails debrided   Calluses debrided without pain or complication   Patient will follow up with pain management and acupuncture specialists   In addition all abnormal skin debrided without pain or complication  we will maintain Cymbalta dosing is 60 mg b i d     Patient has been referred to neuro surgery for workup and possible intervention for chronic radiculopathy       Subjective:  Patient complains of pain in his feet with ambulation   He has pain in his back and legs   He is doing well with acupuncture   He complains of pain with shoe wear   Patient is status post injury to right lower extremity  This required hospitalization  He is having increasing pain  He has pain in his leg and back    Patient is using walker                Past Medical History:   Diagnosis Date    Arthritis      Atrial flutter (HCC)      Chronic kidney disease       stage 3    Coronary artery disease       2 stents    Fluid retention      Gout      Heart failure (HCC)       pacemaker    Hypertension      Pacemaker      Pulmonary emphysema (HCC)      Radiculopathy       last assessed 1/28/16     Shortness of breath       exertion    Sleep apnea       c pap                   Past Surgical History:   Procedure Laterality Date    ANGIOPLASTY         x2 2 stents and then replaced    CARDIAC PACEMAKER PLACEMENT         pacemaker permanent placement dual chamber / last assessed 4/7/14 / implantation     CARDIAC SURGERY         pacemaker    CHOLECYSTECTOMY        CORONARY ANGIOPLASTY WITH STENT PLACEMENT        EPIDURAL BLOCK INJECTION N/A 5/26/2016     Procedure: BLOCK / INJECTION EPIDURAL STEROID LUMBAR  L4-5;  Surgeon: Gus Clinton MD;  Location: [facility] OR;  Service:     EYE SURGERY         cataract left    KNEE ARTHROSCOPY W/ MENISCAL REPAIR Left      LUMBAR EPIDURAL INJECTION N/A 3/17/2016     Procedure: BLOCK / INJECTION LUMBAR  L4-5  (C-ARM);  Surgeon: Kaitlin Chirinos MD;  Location: [facility] MAIN OR;  Service:          No Known Allergies        Current Outpatient Prescriptions:     albuterol (VENTOLIN HFA) 90 mcg/act inhaler, Inhale 2 puffs every 6 (six) hours as needed for wheezing, Disp: 1 Inhaler, Rfl: 6    allopurinol (ZYLOPRIM) 100 mg tablet, Take 1 tablet (100 mg total) by mouth daily, Disp: 90 tablet, Rfl: 3    ascorbic acid (VITAMIN C) 500 mg tablet, Take 500 mg by mouth daily  , Disp: , Rfl:     B Complex Vitamins (B COMPLEX 1 PO), Take 1 tablet by mouth daily  , Disp: , Rfl:     Biotin (BIOTIN 5000) 5 MG CAPS, Take 1,000 mcg by mouth daily  , Disp: , Rfl:     calcium carbonate-vitamin D (OSCAL-D) 500 mg-200 units per tablet, Take 2 tablets by mouth daily at bedtime  , Disp: , Rfl:     ciclopirox (LOPROX) 0 77 % cream, Apply topically 2 (two) times a day for 20 days, Disp: 45 g, Rfl: 2    clopidogrel (PLAVIX) 75 mg tablet, TAKE 1 TABLET DAILY, Disp: 90 tablet, Rfl: 3    collagenase (SANTYL) ointment, Apply 1 application topically daily  , Disp: , Rfl:     Cranberry 1000 MG CAPS, Take 1 tablet by mouth 2 (two) times a day , Disp: , Rfl:     cyanocobalamin 1000 MCG tablet, Take 100 mcg by mouth daily, Disp: , Rfl:     felodipine (PLENDIL) 5 mg 24 hr tablet, TAKE 1 TABLET DAILY, Disp: 90 tablet, Rfl: 3    finasteride (PROSCAR) 5 mg tablet, Take 1 tablet (5 mg total) by mouth daily, Disp: 90 tablet, Rfl: 3    Flaxseed, Linseed, (EQL FLAX SEED OIL) 1000 MG CAPS, Take by mouth daily  , Disp: , Rfl:     fluocinonide (LIDEX) 0 05 % cream, Apply topically 2 (two) times a day for 30 days, Disp: 30 g, Rfl: 2    fluticasone-salmeterol (ADVAIR) 250-50 mcg/dose inhaler, Inhale 1 puff 2 (two) times a day Rinse mouth after use , Disp: 1 Inhaler, Rfl: 6    furosemide (LASIX) 40 mg tablet, TAKE 1 TABLET BY MOUTH DAILY AS NEEDED FOR LEG EDEMA OR DYSNEA, Disp: 30 tablet, Rfl: 0    gabapentin (NEURONTIN) 300 mg capsule, TAKE 1 CAPSULE (300 MG TOTAL) BY MOUTH 2 (TWO) TIMES A DAY FOR 30 DAYS, Disp: 60 capsule, Rfl: 2    gabapentin (NEURONTIN) 300 mg capsule, Take 1 capsule (300 mg total) by mouth 3 (three) times a day for 30 days, Disp: 90 capsule, Rfl: 0    gabapentin (NEURONTIN) 600 MG tablet, Take 1 tablet (600 mg total) by mouth 3 (three) times a day for 30 days, Disp: 60 tablet, Rfl: 0    glucosamine-chondroitin 500-400 MG tablet, Take 1 tablet by mouth 2 (two) times a day , Disp: , Rfl:     hydrOXYzine HCL (ATARAX) 25 mg tablet, Take 1 tablet (25 mg total) by mouth 3 (three) times a day for 3 days, Disp: 9 tablet, Rfl: 0    lactase (LACTAID) 3,000 units tablet, Take 3,000 Units by mouth as needed  , Disp: , Rfl:     methylPREDNISolone 4 MG tablet therapy pack, Use as directed on package, Disp: 21 tablet, Rfl: 0    Omega-3 Fatty Acids (FISH OIL) 1,000 mg, Take 1,000 mg by mouth 2 (two) times a day , Disp: , Rfl:     oxyCODONE-acetaminophen (PERCOCET) 5-325 mg per tablet, 1 tablet to be taken 3 times daily as needed for leg pain, Disp: 30 tablet, Rfl: 0    PARoxetine (PAXIL) 20 mg tablet, TAKE 1/2 TABLET BY MOUTH DAILY, Disp: 30 tablet, Rfl: 0    psyllium (METAMUCIL) 58 6 % powder, Take 1 packet by mouth daily Indications: 1 tsp , Disp: , Rfl:     rivaroxaban (XARELTO) tablet, Take 20 mg by mouth , Disp: , Rfl:     simvastatin (ZOCOR) 20 mg tablet, Take 1 tablet (20 mg total) by mouth daily at bedtime, Disp: 90 tablet, Rfl: 3    traMADol (ULTRAM) 50 mg tablet, 1 tablet to be taken twice daily as needed for foot and leg pain, Disp: 30 tablet, Rfl: 0           Patient Active Problem List   Diagnosis    Chronic pain disorder    Bilateral lumbar radiculopathy    Lumbar canal stenosis    Acquired ankle/foot deformity    Pain in joints of both feet    Dermatophytosis    Atherosclerosis of arteries of extremities (HCC)    Pain in both feet    Onychomycosis    Neck muscle spasm    Anxiety 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    Benign hypertension with CKD (chronic kidney disease) stage III (HCC)    Bilateral leg edema    CKD (chronic kidney disease), stage III (HCC)    Microscopic hematuria    Proteinuria    Urinary frequency    Atrial fibrillation (HCC)    Benign prostatic hyperplasia    Congestive heart failure (HCC)    Arteriosclerosis of coronary artery    Allergic rhinitis    Aneurysm of abdominal aorta (HCC)    Angina pectoris (HCC)    Pain in joint involving ankle and foot    Atrial flutter (HCC)    Carpal tunnel syndrome    Chronic gout without tophus    Chronic low back pain    Chronic obstructive pulmonary disease (HCC)    Colon, diverticulosis    Deep venous insufficiency    Degenerative disc disease, lumbar    Difficulty in walking    Fecal soiling    Fibromyalgia    Fistula-in-ano    Hyperlipidemia    Insomnia, persistent    Memory loss    Moderate or severe vision impairment, one eye    Morbid obesity (HCC)    Nicotine dependence    Osteoarthritis of knee    Pacemaker    Sleep apnea    Urinary incontinence    Venous insufficiency (chronic) (peripheral)    BMI 45 0-49 9, adult (HCC)    Carbuncle of neck    Carbuncle of occipital region of scalp    Elevated glucose    Peripheral arteriosclerosis (HCC)    Radiculopathy of lumbar region    Metatarsalgia of both feet          Physical Exam  Left Foot: Appearance: Normal except as noted: excessive pronation-- and-- pes planus  Second toe deformities include hammer toe  Third toe deformities include hammer toe  Forth toe deformities include hammer toe  Fifth toe deformities include hammer toe  Tenderness: None except the great toe,-- distal first metatarsal,-- distal fifth metatarsal-- and-- insertion of the plantar fascia   Positive Brian sign third left intermetatarsal space  ROM: Full  Motor: Normal   Right Foot: Appearance: Normal except as noted: excessive pronation-- and-- pes planus  Second toe deformities include hammer toe  Third toe deformities include hammer toe  Forth toe deformities include hammer toe  Fifth toe deformities include hammer toe  Evaluation of the great toe nail demonstrates an ingrown nail  Tenderness: None except the insertion of the plantar fascia  Left Ankle: Appearance: Normal except erythema,-- swelling-- and-- swelling laterally  Tenderness: None except the tibiotalar joint  Right Ankle: ROM: Full  Neurological Exam: performed  Deep tendon reflexes: + tinel of right tibial nerve  Vascular Exam: performed Dorsalis pedis pulses were present bilaterally  Posterior tibial pulses were present bilaterally  Elevation Pallor: absent bilaterally  Capillary refill time was less than 1 second bilaterally  Edema: moderate bilaterally-- and-- 4/7 pitting  neg  buddy  Toenails: All of the toenails were elongated,-- hypertrophied,-- discolored,-- ingrown,-- shown to have subungual debris,-- tender-- and-- Mycotic with onychauxis  Hyperkeratosis: present on both first toes,-- present on both first sub metatarsals-- and-- present on both fifth sub metatarsals  Shoe Gear Evaluation: performed ()   Recommendation(s): extra depth diabetic shoes

## 2022-05-27 NOTE — TELEPHONE ENCOUNTER
Called wife, notified her that [facility] was denied, I didn't see reason on file    Advised her we will call back next week with more information

## 2022-05-31 NOTE — TELEPHONE ENCOUNTER
Patient called back for status of denial      Please call patient with any updates        [phone] or [phone]

## 2022-06-01 ENCOUNTER — TELEPHONE (OUTPATIENT)
Dept: FAMILY MEDICINE CLINIC | Facility: CLINIC | Age: 82
End: 2022-06-01

## 2022-06-01 NOTE — TELEPHONE ENCOUNTER
Called and LVM with intake nurse station at [address] [place] [address] Chaparro Roberts to get reason for Denial   Awaiting call back

## 2022-06-01 NOTE — TELEPHONE ENCOUNTER
DR Nette Samayoa    Patient called and said his leg dr wants him to have in home physical therapy  Please advise and order

## 2022-06-01 NOTE — TELEPHONE ENCOUNTER
Sw pt  Surgeon is Dr Sam Lindsay and he did order home PT but through [email or web address], but pt lives in [place] so it is outside service area

## 2022-06-02 ENCOUNTER — TELEPHONE (OUTPATIENT)
Dept: OBGYN CLINIC | Facility: HOSPITAL | Age: 82
End: 2022-06-02

## 2022-06-02 NOTE — TELEPHONE ENCOUNTER
DR Estefani Spann  RE: orders for referral for Homecare  CB: [phone]    Dr Tessie Barrios called asking if a new order can be placed for [facility] PT secondary to [facility] not covering the [place] area  Caller asked if nonspecific referral for Homehealth PT be placed in EPIC

## 2022-06-03 NOTE — TELEPHONE ENCOUNTER
I called and spoke to [facility] and let him know that the PT order was written and faxed to [facility] (OUTPATIENT [facility])  Patient should receive a call from [place] soon

## 2022-06-03 NOTE — TELEPHONE ENCOUNTER
Mike Schwartz script written and faxed to number provided  Can you call patient to notify  Thank you!

## 2022-06-21 DIAGNOSIS — I25.10 CORONARY ARTERY ARTERIOSCLEROSIS: ICD-10-CM

## 2022-06-21 RX ORDER — SIMVASTATIN 20 MG
TABLET ORAL
Qty: 90 TABLET | Refills: 1 | Status: SHIPPED | OUTPATIENT
Start: 2022-06-21

## 2022-06-22 ENCOUNTER — OFFICE VISIT (OUTPATIENT)
Dept: PULMONOLOGY | Facility: MEDICAL CENTER | Age: 82
End: 2022-06-22
Payer: MEDICARE

## 2022-06-22 VITALS
WEIGHT: 267.4 LBS | HEART RATE: 71 BPM | SYSTOLIC BLOOD PRESSURE: 154 MMHG | HEIGHT: 73 IN | OXYGEN SATURATION: 98 % | BODY MASS INDEX: 35.44 KG/M2 | DIASTOLIC BLOOD PRESSURE: 86 MMHG | RESPIRATION RATE: 12 BRPM | TEMPERATURE: 97.2 F

## 2022-06-22 DIAGNOSIS — J43.2 CENTRILOBULAR EMPHYSEMA (HCC): Chronic | ICD-10-CM

## 2022-06-22 DIAGNOSIS — Z72.0 NICOTINE ABUSE: Primary | ICD-10-CM

## 2022-06-22 DIAGNOSIS — G47.33 OSA (OBSTRUCTIVE SLEEP APNEA): Chronic | ICD-10-CM

## 2022-06-22 DIAGNOSIS — F17.200 SMOKER: ICD-10-CM

## 2022-06-22 PROCEDURE — 99214 OFFICE O/P EST MOD 30 MIN: CPT | Performed by: NURSE PRACTITIONER

## 2022-06-22 NOTE — ASSESSMENT & PLAN NOTE
Patient has a history of obstructive sleep apnea  He is here today for compliance  Data is reviewed for the last 30 days for which she is 97% compliant average usage is 4 hours and 54 minutes  This is on IPAP of 25 EPAP of 21  AHI is reduced to 3 2 events per hour  It is also noted that his average IPAP 90% of the time is 20 and EPAP 90% of the time is 16 4    Patient continues to use an benefit from auto BiPAP

## 2022-06-22 NOTE — PROGRESS NOTES
Assessment/Plan:     Problem List Items Addressed This Visit        Respiratory    Centrilobular emphysema ([address] ) (Chronic)     Patient has centrilobular emphysema  Last PFT revealed an FEV1 of 2 07 or 67% of predicted  He continues to use Trelegy 100 mcg 1 puff daily  He also has nebulizer with DuoNeb that he uses on occasion  PFT will be done in next visit           JOVI (obstructive sleep apnea) (Chronic)     Patient has a history of obstructive sleep apnea  He is here today for compliance  Data is reviewed for the last 30 days for which she is 97% compliant average usage is 4 hours and 54 minutes  This is on IPAP of 25 EPAP of 21  AHI is reduced to 3 2 events per hour  It is also noted that his average IPAP 90% of the time is 20 and EPAP 90% of the time is 16 4  Patient continues to use an benefit from auto BiPAP              Other    Smoker - Primary (Chronic)     Patient was a smoker most of his adult life  He quit approximately 3-4 years ago and only recently restarted smoking  We did talk about smoking cessation a smokes 5 cigarettes a day  I did discuss with him nicotine replacement  He is scheduled for a low-dose radiation CT of chest in July of 2022                   Return in about 6 months (around 12/22/2022)  All questions are answered to the patient's satisfaction and understanding  He verbalizes understanding  He is encouraged to call with any further questions or concerns  Portions of the record may have been created with voice recognition software  Occasional wrong word or "sound a like" substitutions may have occurred due to the inherent limitations of voice recognition software  Read the chart carefully and recognize, using context, where substitutions have occurred  Electronically Signed by NAVA Claire    ______________________________________________________________________    Chief Complaint: No chief complaint on file  Patient ID: Nilay Culver is a 80 y o  y o  male has a past medical history of Arthritis, Atrial flutter ([address] ), Chronic kidney disease, Coronary artery disease, Fluid retention, Gout, Heart failure ([address] ), Hypertension, Hyponatremia (4/8/2019), Pacemaker, Pulmonary emphysema ([address] ), Radiculopathy, Shortness of breath, and Sleep apnea  6/22/2022  Patient presents today for follow-up visit  HPI  Steven 66-year-old male who has a history of centrilobular emphysema  His last pulmonary function test was done in November 2020  Forced vital capacity was 3 19 L or 76% of predicted, FEV1 was 1 92 L or 63% obstruction ratio was 60%  His residual volume was elevated 125% of predicted moderate obstructive air trapping noted  Patient also has a history of severe obstructive sleep apnea  He has a a new BiPAP machine  He was titrated to auto BiPAP  He is here today for compliance and re-evaluation  Patient is a former smoker who has but states he has now smoking on occasion  He had at last CT of his chest done in July of 2021  Central airways were patent and there was also diffuse bronchial thickening compatible with chronic bronchitis and multiple nodules of left lung by similar to the previous study based on the appearance these favored inflammation or aspiration  Lung nodules measured up to 7 mm  Severe coronary artery calcification was noted     Review of Systems   Constitutional: Negative  HENT: Negative  Eyes: Negative  Respiratory: Positive for cough and shortness of breath  Shortness of breath with exertion   Cardiovascular: Negative  Gastrointestinal: Negative  Endocrine: Negative  Musculoskeletal: Negative  Allergic/Immunologic: Negative  Neurological: Negative  Hematological: Negative  Psychiatric/Behavioral: Negative  Smoking history: He reports that he quit smoking about 3 years ago  His smoking use included cigarettes  He has a 75 00 pack-year smoking history   He has never used smokeless tobacco     The following portions of the patient's history were reviewed and updated as appropriate: current medications, past family history, past medical history, past social history, past surgical history and problem list     Immunization History   Administered Date(s) Administered    COVID-19 MODERNA VACC 0 5 ML IM 02/26/2021, 03/26/2021, 12/08/2021    Influenza Split High Dose Preservative Free IM 12/03/2013, 10/23/2015    Influenza, high dose seasonal 0 7 mL 10/01/2018, 10/10/2019, 09/21/2020, 09/17/2021    Influenza, seasonal, injectable 11/05/2001, 12/15/2009, 01/20/2011, 10/19/2011, 10/01/2012    Pneumococcal Conjugate 13-Valent 07/09/2019    Pneumococcal Polysaccharide PPV23 03/26/2013    Tdap 04/01/2016, 04/03/2016    Zoster 10/01/2012    Zoster Vaccine Recombinant 02/03/2020, 07/08/2020     Current Outpatient Medications   Medication Sig Dispense Refill    acetaminophen (TYLENOL) 325 mg tablet Take 650 mg by mouth every 6 (six) hours as needed      Albuterol Sulfate (albuterol, FOR EMS ONLY,) (2 5 mg/3 mL) 0 083 % nebulizer solution Take 2 5 mg by nebulization every 6 (six) hours as needed for wheezing      allopurinol (ZYLOPRIM) 100 mg tablet Take 2 tablets (200 mg total) by mouth daily 180 tablet 3    ascorbic acid (VITAMIN C) 500 mg tablet Take 500 mg by mouth daily   B Complex Vitamins (B COMPLEX 1 PO) Take 1 tablet by mouth daily   Biotin 5 MG CAPS Take 1,000 mcg by mouth daily   calcium carbonate-vitamin D (OSCAL-D) 500 mg-200 units per tablet Take 2 tablets by mouth daily at bedtime   cholecalciferol (VITAMIN D3) 1,000 units tablet Take 1,000 Units by mouth daily      clopidogrel (Plavix) 75 mg tablet Take 1 tablet (75 mg total) by mouth daily 90 tablet 1    Cranberry 1000 MG CAPS Take 1 tablet by mouth 2 (two) times a day        cyanocobalamin 1000 MCG tablet Take 100 mcg by mouth daily      Dapagliflozin Propanediol 5 MG TABS Take by mouth daily      docusate sodium (COLACE) 100 mg capsule Take 1 capsule (100 mg total) by mouth 2 (two) times a day 10 capsule 0    felodipine (PLENDIL) 5 mg 24 hr tablet Take 1 tablet (5 mg total) by mouth daily 90 tablet 1    Ferrous Sulfate (IRON PO) Take by mouth      finasteride (PROSCAR) 5 mg tablet Take 1 tablet (5 mg total) by mouth daily 90 tablet 3    Flaxseed, Linseed, (EQL FLAX SEED OIL) 1000 MG CAPS Take by mouth daily   furosemide (LASIX) 20 mg tablet Take 1 tablet (20 mg total) by mouth every evening  0    furosemide (LASIX) 40 mg tablet 1 TABLET BY MOUTH IN THE MORNING AND 1/2 TABLET IN THE EVENING 135 tablet 2    polyethylene glycol (MIRALAX) 17 g packet Take 17 g by mouth daily as needed      rivaroxaban (XARELTO) 15 mg tablet Take 1 tablet (15 mg total) by mouth daily with breakfast (Patient taking differently: Take 20 mg by mouth daily at bedtime)      simvastatin (ZOCOR) 20 mg tablet TAKE 1 TABLET DAILY AT     BEDTIME 90 tablet 1    spironolactone (ALDACTONE) 25 mg tablet Take 25 mg by mouth daily      albuterol (Ventolin HFA) 90 mcg/act inhaler Inhale 2 puffs every 6 (six) hours as needed for wheezing (Patient not taking: No sig reported) 3 Inhaler 3    dextromethorphan-guaiFENesin (ROBITUSSIN DM)  mg/5 mL syrup Take 10 mL by mouth every 4 (four) hours as needed for cough (Patient not taking: No sig reported) 118 mL 0    DULoxetine (CYMBALTA) 60 mg delayed release capsule TAKE ONE CAPSULE BY MOUTH EVERY DAY 90 capsule 1    DULoxetine (CYMBALTA) 60 mg delayed release capsule Take 1 capsule (60 mg total) by mouth 2 (two) times a day 60 capsule 1    fluticasone-umeclidinium-vilanterol (Trelegy Ellipta) 200-62 5-25 MCG/INH AEPB inhaler Inhale 1 puff daily Rinse mouth after use   (Patient not taking: No sig reported) 180 blister 3    senna (SENOKOT) 8 6 MG tablet Take 1 tablet by mouth daily      Wound Dressings ([facility] Wound/Burn Dressing) GEL Apply topically if needed Apply to right buttock as needed No current facility-administered medications for this visit  Allergies: Patient has no known allergies  Objective:  Vitals:    06/22/22 1305   BP: 154/86   Pulse: 71   Resp: 12   Temp: (!) 97 2 °F (36 2 °C)   TempSrc: Temporal   SpO2: 98%   Weight: 121 kg (267 lb 6 4 oz)   Height: 6' 1" (1 854 m)   Oxygen Therapy  SpO2: 98 %    Wt Readings from Last 3 Encounters:   06/22/22 121 kg (267 lb 6 4 oz)   05/26/22 117 kg (259 lb)   05/26/22 117 kg (259 lb)     Body mass index is 35 28 kg/m²  Physical Exam  Constitutional:       Appearance: He is obese  HENT:      Head: Normocephalic and atraumatic  Nose: Nose normal       Mouth/Throat:      Mouth: Mucous membranes are dry  Comments: Mallampati 4  Eyes:      Pupils: Pupils are equal, round, and reactive to light  Cardiovascular:      Rate and Rhythm: Normal rate and regular rhythm  Pulses: Normal pulses  Pulmonary:      Effort: Pulmonary effort is normal    Musculoskeletal:         General: Normal range of motion  Cervical back: Normal range of motion and neck supple  Skin:     General: Skin is warm and dry  Capillary Refill: Capillary refill takes less than 2 seconds  Neurological:      General: No focal deficit present  Mental Status: He is alert and oriented to person, place, and time  Psychiatric:         Mood and Affect: Mood normal          Behavior: Behavior normal          Lab Review:   Office Visit on 05/26/2022   Component Date Value    Urine Culture 05/26/2022 <10,000 cfu/ml Proteus species (A)    Urine Culture 05/26/2022 <10,000 cfu/ml Gram-positive coccus-Staph (A)   No results displayed because visit has over 200 results        Orders Only on 01/05/2022   Component Date Value    Glucose, Random 01/05/2022 136 (A)    BUN 01/05/2022 30 (A)    Creatinine 01/05/2022 1 57 (A)    eGFR Non  01/05/2022 41 (A)    eGFR  01/05/2022 47 (A)    SL AMB BUN/CREATININE RA* 01/05/2022 19     Sodium 01/05/2022 138     Potassium 01/05/2022 4 5     Chloride 01/05/2022 99     CO2 01/05/2022 23     CALCIUM 01/05/2022 9 4     Protein, Total 01/05/2022 8 0     Albumin 01/05/2022 3 7     Globulin, Total 01/05/2022 4 3     Albumin/Globulin Ratio 01/05/2022 0 9 (A)    TOTAL BILIRUBIN 01/05/2022 0 8     Alk Phos Isoenzymes 01/05/2022 98     AST 01/05/2022 17     ALT 01/05/2022 16     Interpretation 01/05/2022 Note     Magnesium, Serum 01/05/2022 2 7 (A)       Past Surgical History:   Procedure Laterality Date    ANGIOPLASTY      x2 2 stents and then replaced    CARDIAC PACEMAKER PLACEMENT      pacemaker permanent placement dual chamber / last assessed 4/7/14 / implantation     CARDIAC SURGERY      pacemaker    CHOLECYSTECTOMY      CORONARY ANGIOPLASTY WITH STENT PLACEMENT      EPIDURAL BLOCK INJECTION N/A 5/26/2016    Procedure: BLOCK / INJECTION EPIDURAL STEROID LUMBAR  L4-5;  Surgeon: Emili Culver MD;  [facility] MAIN OR;  Service:     EPIDURAL BLOCK INJECTION N/A 2/14/2019    Procedure: L4 L5 Lumbar Epidural Steroid Injection;  Surgeon: Emili Culver MD;  Location: [facility] MAIN OR;  Service: Pain Management     EYE SURGERY      cataract left    HIP PINNING Left     KNEE ARTHROSCOPY W/ MENISCAL REPAIR Left     LUMBAR EPIDURAL INJECTION N/A 3/17/2016    Procedure: BLOCK / INJECTION LUMBAR  L4-5  (C-ARM); Surgeon: Emili Culver MD;  Location: [facility] MAIN OR;  Service:     MI OPEN RX FEMUR FX+INTRAMED ELLA Right 1/31/2022    Procedure: INSERTION NAIL IM FEMUR ANTEGRADE (TROCHANTERIC); Surgeon: Ghazala Ortiz MD;  Location:  Main OR;  Service: Orthopedics        Family History   Problem Relation Age of Onset    Cancer Mother 80    Heart disease Mother     Hypertension Mother     Heart disease Father     Diabetes Neg Hx     Stroke Neg Hx         Diagnostics:  I have personally reviewed pertinent reports        Office Spirometry Results:     ESS:    XR hip/pelv 2-3 vws right if performed    Result Date: 5/25/2022  Narrative: RIGHT HIP INDICATION:   [id number]: Other specified postprocedural states  COMPARISON:  Radiographs from March 16, 2022 and January 29, 2022  VIEWS:  XR HIP/PELV 2-3 VWS RIGHT W PELVIS IF PERFORMED FINDINGS: There is no acute fracture or dislocation  Orthopedic nail with interlocking intramedullary mc with distal fixation screw remain in place  Essentially complete healing of previously seen intertrochanteric right hip fracture  No significant hip degenerative changes  No lytic or blastic osseous lesion  Soft tissues are unremarkable  Atherosclerotic vascular calcifications again noted  The visualized lumbar spine is unremarkable  Osteoarthritic changes of SI joints  Impression: No acute osseous abnormality status post ORIF of essentially healed right intratrochanteric hip fracture   Workstation performed: [id number]

## 2022-06-22 NOTE — ASSESSMENT & PLAN NOTE
Patient has centrilobular emphysema  Last PFT revealed an FEV1 of 2 07 or 67% of predicted  He continues to use Trelegy 100 mcg 1 puff daily  He also has nebulizer with DuoNeb that he uses on occasion    PFT will be done in next visit

## 2022-06-22 NOTE — PATIENT INSTRUCTIONS
Sleep Apnea   AMBULATORY CARE:   Sleep apnea  is a condition that causes you to stop breathing often during sleep  Types of sleep apnea:   Obstructive sleep apnea (JOVI)  is the most common kind  The muscles and tissues around your throat relax and block air from passing through  Obesity, use of alcohol or cigarettes, or a family history are common causes  JOVI may increase your risk for complications after surgery  Central sleep apnea (CSA)  means your brain does not send signals to the muscles that control breathing  You do not take a breath even though your airway is open  Common causes include medical conditions such as heart failure, being older than 40, or use of opioids  Complex (or mixed) sleep apnea  means you have both obstructive and central sleep apnea  Common signs and symptoms:   Loud snoring or long pauses in breathing    Feeling sleepy, slow, and tired during the day    Snorting, gasping, or choking while you sleep, and waking up suddenly because of these    Feeling irritable during the day    Dry mouth or a headache in the mornings    Heavy night sweating    A hard time thinking, remembering things, or focusing on your tasks the following day    Call your local emergency number (911 in the [address] [facility]) if:   You have chest pain or trouble breathing  Call your doctor if:   You have new or worsening signs or symptoms  You have questions or concerns about your condition or care  Treatment  depends on the kind of apnea you have  A mouth device  may be needed if you have mild sleep apnea  These are designed to keep your throat open  Ask your dentist or healthcare provider about the best mouth device for you  A machine  may be used to help you get more air during sleep  A mask may be placed over your nose and mouth, or just your nose  The mask is hooked to the machine  You will get air through the mask      A continuous positive airway pressure (CPAP) machine  is used to keep your airway open during sleep  The machine blows a gentle stream of air into the mask when you breathe  This helps keep your airway open so you can breathe more regularly  Extra oxygen may be given through the machine  A bilevel positive airway pressure (BiPAP) machine  gives air but lowers the pressure when you breathe out  An adaptive servo-ventilator (ASV)  is a machine that learns your usual breathing pattern  Then, it uses pressure to give you air and prevent stops in your breathing  Surgery  to expand your airway or remove extra tissues may be needed  Surgery is usually only considered if other treatments do not work  Manage or prevent sleep apnea:   Reach and maintain a healthy weight  Ask your healthcare provider what a healthy weight is for you  Ask him or her to help you create a safe weight loss plan if you are overweight  Even a small goal of a 10% weight loss can improve your symptoms  Do not smoke  Nicotine and other chemicals in cigarettes and cigars can cause lung damage  Ask your healthcare provider for information if you currently smoke and need help to quit  E-cigarettes or smokeless tobacco still contain nicotine  Talk to your healthcare provider before you use these products  Do not drink alcohol or take sedative medicine before you go to sleep  Alcohol and sedatives can relax the muscles and tissues around your throat  This can block the airflow to your lungs  Sleep on your side or use pillows designed to prevent sleep apnea  This prevents your tongue or other tissues from blocking your throat  You can also raise the head of your bed  Follow up with your doctor or specialist as directed: You may need to have blood tests during your follow-up visits  Work with your provider to find the right breathing support equipment and settings for you  Write down your questions so you remember to ask them during your visits    © Copyright [employer] 2022 Information is for End User's use only and may not be sold, redistributed or otherwise used for commercial purposes  All illustrations and images included in CareNotes® are the copyrighted property of A D A M , Inc  or Alysia Logan  The above information is an  only  It is not intended as medical advice for individual conditions or treatments  Talk to your doctor, nurse or pharmacist before following any medical regimen to see if it is safe and effective for you

## 2022-07-13 ENCOUNTER — TELEPHONE (OUTPATIENT)
Dept: NEPHROLOGY | Facility: CLINIC | Age: 82
End: 2022-07-13

## 2022-07-13 NOTE — TELEPHONE ENCOUNTER
Called and spoke with Spouse to complete their bloodwork prior to their appointment on 07/21/22 with Dr Miky El[facility]

## 2022-07-14 NOTE — TELEPHONE ENCOUNTER
Pt's wife called in stating his labwork order exp 6/30  He needs new orders placed and faxed to [facility]  She is requesting a call back to confirm this has been done

## 2022-07-15 ENCOUNTER — TRANSCRIBE ORDERS (OUTPATIENT)
Dept: URGENT CARE | Facility: CLINIC | Age: 82
End: 2022-07-15

## 2022-07-15 ENCOUNTER — APPOINTMENT (OUTPATIENT)
Dept: RADIOLOGY | Facility: CLINIC | Age: 82
End: 2022-07-15
Payer: MEDICARE

## 2022-07-15 DIAGNOSIS — N20.0 CALCULUS, RENAL: ICD-10-CM

## 2022-07-15 DIAGNOSIS — N20.0 CALCULUS, RENAL: Primary | ICD-10-CM

## 2022-07-15 PROCEDURE — 74018 RADEX ABDOMEN 1 VIEW: CPT

## 2022-07-16 ENCOUNTER — HOSPITAL ENCOUNTER (EMERGENCY)
Facility: HOSPITAL | Age: 82
Discharge: HOME | End: 2022-07-16
Attending: EMERGENCY MEDICINE | Admitting: EMERGENCY MEDICINE
Payer: MEDICARE

## 2022-07-16 VITALS
TEMPERATURE: 96.5 F | BODY MASS INDEX: 34.96 KG/M2 | DIASTOLIC BLOOD PRESSURE: 62 MMHG | HEART RATE: 70 BPM | OXYGEN SATURATION: 96 % | SYSTOLIC BLOOD PRESSURE: 128 MMHG | RESPIRATION RATE: 18 BRPM | WEIGHT: 265 LBS

## 2022-07-16 DIAGNOSIS — R04.0 EPISTAXIS: Primary | ICD-10-CM

## 2022-07-16 PROCEDURE — 99283 EMERGENCY DEPT VISIT LOW MDM: CPT

## 2022-07-16 PROCEDURE — 30903 CONTROL OF NOSEBLEED: CPT | Performed by: EMERGENCY MEDICINE

## 2022-07-16 PROCEDURE — 99284 EMERGENCY DEPT VISIT MOD MDM: CPT | Performed by: EMERGENCY MEDICINE

## 2022-07-16 RX ORDER — AMOXICILLIN AND CLAVULANATE POTASSIUM 875; 125 MG/1; MG/1
1 TABLET, FILM COATED ORAL EVERY 12 HOURS
Qty: 10 TABLET | Refills: 0 | Status: SHIPPED | OUTPATIENT
Start: 2022-07-16 | End: 2022-07-18 | Stop reason: SDUPTHER

## 2022-07-16 RX ORDER — OXYMETAZOLINE HYDROCHLORIDE 0.05 G/100ML
2 SPRAY NASAL ONCE
Status: COMPLETED | OUTPATIENT
Start: 2022-07-16 | End: 2022-07-16

## 2022-07-16 RX ORDER — LIDOCAINE HYDROCHLORIDE 20 MG/ML
15 SOLUTION OROPHARYNGEAL ONCE
Status: COMPLETED | OUTPATIENT
Start: 2022-07-16 | End: 2022-07-16

## 2022-07-16 RX ADMIN — OXYMETAZOLINE HYDROCHLORIDE 2 SPRAY: 0.05 SPRAY NASAL at 13:00

## 2022-07-16 RX ADMIN — LIDOCAINE HYDROCHLORIDE 15 ML: 20 SOLUTION ORAL; TOPICAL at 13:15

## 2022-07-16 RX ADMIN — SILVER NITRATE APPLICATORS 1 APPLICATOR: 25; 75 STICK TOPICAL at 13:00

## 2022-07-16 NOTE — ED PROVIDER NOTES
History  Chief Complaint   Patient presents with    Nose Bleed     States he went to pull a booger out of his left nostril at 7am and started with nosebleed  Patient on Coumadin  States his wife " put a plug in my nostril "  Blood noted to be running out of right nostril also  Med list has Xarelto listed, patient states " maybe I'm on that, who the hell knows , my wife takes care of my meds "     80-year-old male presents with bleeding from his left nare, started this morning when he picked his nose  He is on Xarelto  Denies any other injuries no headache no other complaints at this time  History provided by:  Patient   used: No        Prior to Admission Medications   Prescriptions Last Dose Informant Patient Reported? Taking? Albuterol Sulfate (albuterol, FOR EMS ONLY,) (2 5 mg/3 mL) 0 083 % nebulizer solution   Yes No   Sig: Take 2 5 mg by nebulization every 6 (six) hours as needed for wheezing   B Complex Vitamins (B COMPLEX 1 PO)  Child Yes No   Sig: Take 1 tablet by mouth daily  Biotin 5 MG CAPS  Child Yes No   Sig: Take 1,000 mcg by mouth daily  Cranberry 1000 MG CAPS  Child Yes No   Sig: Take 1 tablet by mouth 2 (two) times a day  DULoxetine (CYMBALTA) 60 mg delayed release capsule   No No   Sig: TAKE ONE CAPSULE BY MOUTH EVERY DAY   DULoxetine (CYMBALTA) 60 mg delayed release capsule   No No   Sig: Take 1 capsule (60 mg total) by mouth 2 (two) times a day   Dapagliflozin Propanediol 5 MG TABS   Yes No   Sig: Take by mouth daily   Ferrous Sulfate (IRON PO)   Yes No   Sig: Take by mouth   Flaxseed, Linseed, (EQL FLAX SEED OIL) 1000 MG CAPS  Child Yes No   Sig: Take by mouth daily     Wound Dressings ([facility] Dressing) GEL   Yes No   Sig: Apply topically if needed Apply to right buttock as needed   acetaminophen (TYLENOL) 325 mg tablet   Yes No   Sig: Take 650 mg by mouth every 6 (six) hours as needed   albuterol (Ventolin HFA) 90 mcg/act inhaler   No No   Sig: Inhale 2 puffs every 6 (six) hours as needed for wheezing   Patient not taking: No sig reported   allopurinol (ZYLOPRIM) 100 mg tablet   No No   Sig: Take 2 tablets (200 mg total) by mouth daily   ascorbic acid (VITAMIN C) 500 mg tablet  Child Yes No   Sig: Take 500 mg by mouth daily  calcium carbonate-vitamin D (OSCAL-D) 500 mg-200 units per tablet  Child Yes No   Sig: Take 2 tablets by mouth daily at bedtime  cholecalciferol (VITAMIN D3) 1,000 units tablet  Child Yes No   Sig: Take 1,000 Units by mouth daily   clopidogrel (Plavix) 75 mg tablet   No No   Sig: Take 1 tablet (75 mg total) by mouth daily   cyanocobalamin 1000 MCG tablet  Child Yes No   Sig: Take 100 mcg by mouth daily   dextromethorphan-guaiFENesin (ROBITUSSIN DM)  mg/5 mL syrup   No No   Sig: Take 10 mL by mouth every 4 (four) hours as needed for cough   Patient not taking: No sig reported   docusate sodium (COLACE) 100 mg capsule   No No   Sig: Take 1 capsule (100 mg total) by mouth 2 (two) times a day   felodipine (PLENDIL) 5 mg 24 hr tablet   No No   Sig: Take 1 tablet (5 mg total) by mouth daily   finasteride (PROSCAR) 5 mg tablet  Child No No   Sig: Take 1 tablet (5 mg total) by mouth daily   fluticasone-umeclidinium-vilanterol (Trelegy Ellipta) 200-62 5-25 MCG/INH AEPB inhaler   No No   Sig: Inhale 1 puff daily Rinse mouth after use     Patient not taking: No sig reported   furosemide (LASIX) 20 mg tablet   No No   Sig: Take 1 tablet (20 mg total) by mouth every evening   furosemide (LASIX) 40 mg tablet   No No   Si TABLET BY MOUTH IN THE MORNING AND 1/2 TABLET IN THE EVENING   polyethylene glycol (MIRALAX) 17 g packet   Yes No   Sig: Take 17 g by mouth daily as needed   rivaroxaban (XARELTO) 15 mg tablet   No No   Sig: Take 1 tablet (15 mg total) by mouth daily with breakfast   Patient taking differently: Take 20 mg by mouth daily at bedtime   senna (SENOKOT) 8 6 MG tablet   Yes No   Sig: Take 1 tablet by mouth daily   simvastatin (ZOCOR) 20 mg tablet   No No   Sig: TAKE 1 TABLET DAILY AT     BEDTIME   spironolactone (ALDACTONE) 25 mg tablet   Yes No   Sig: Take 25 mg by mouth daily      Facility-Administered Medications: None       Past Medical History:   Diagnosis Date    Arthritis     Atrial flutter (HCC)     Chronic kidney disease     stage 3    Coronary artery disease     2 stents    Fluid retention     Gout     Heart failure ([facility] )     pacemaker    Hypertension     Hyponatremia 4/8/2019    Pacemaker     Pulmonary emphysema ([facility] )     Radiculopathy     last assessed 1/28/16     Shortness of breath     exertion    Sleep apnea     c pap       Past Surgical History:   Procedure Laterality Date    ANGIOPLASTY      x2 2 stents and then replaced   [address]      pacemaker permanent placement dual chamber / last assessed 4/7/14 / implantation     CARDIAC SURGERY      pacemaker    CHOLECYSTECTOMY      CORONARY ANGIOPLASTY WITH STENT PLACEMENT      EPIDURAL BLOCK INJECTION N/A 5/26/2016    Procedure: BLOCK / INJECTION EPIDURAL STEROID LUMBAR  L4-5;  Surgeon: Ed Kendall MD;  Location: [facility] MAIN OR;  Service:     EPIDURAL BLOCK INJECTION N/A 2/14/2019    Procedure: L4 L5 Lumbar Epidural Steroid Injection;  Surgeon: Ed Kendall MD;  Location: [facility] MAIN OR;  Service: Pain Management     EYE SURGERY      cataract left    HIP PINNING Left     KNEE ARTHROSCOPY W/ MENISCAL REPAIR Left     LUMBAR EPIDURAL INJECTION N/A 3/17/2016    Procedure: BLOCK / INJECTION LUMBAR  L4-5  (C-ARM); Surgeon: Ed Kendall MD;  Location: [facility] MAIN OR;  Service:     SC OPEN RX FEMUR FX+INTRAMED ELLA Right 1/31/2022    Procedure: INSERTION NAIL IM FEMUR ANTEGRADE (TROCHANTERIC);   Surgeon: Caitlyn Shepherd MD;  [facility] Main [facility];  Service: Orthopedics       Family History   Problem Relation Age of Onset    Cancer Mother 80    Heart disease Mother     Hypertension Mother     Heart disease Father     Diabetes Neg Hx     Stroke Neg Hx      I have reviewed and agree with the history as documented  E-Cigarette/Vaping    E-Cigarette Use Former User      E-Cigarette/Vaping Substances    Nicotine No     THC No     CBD No     Flavoring No     Other No     Unknown No      Social History     Tobacco Use    Smoking status: Current Every Day Smoker     Packs/day: 0 25     Years: 50 00     Pack years: 12 50     Types: Cigarettes     Last attempt to quit: 3/27/2019     Years since quitting: 3 3    Smokeless tobacco: Never Used    Tobacco comment: quit 02/14/2021   Vaping Use    Vaping Use: Former   Substance Use Topics    Alcohol use: Never    Drug use: No       Review of Systems   Constitutional: Negative  HENT: Positive for nosebleeds  Eyes: Negative  Respiratory: Negative  Cardiovascular: Negative  Gastrointestinal: Negative  Endocrine: Negative  Genitourinary: Negative  Musculoskeletal: Negative  Skin: Negative  Allergic/Immunologic: Negative  Neurological: Negative  Hematological: Negative  Psychiatric/Behavioral: Negative  All other systems reviewed and are negative  Physical Exam  Physical Exam  Constitutional:       Appearance: Normal appearance  HENT:      Head: Normocephalic and atraumatic  Comments: Left nare:  Bleeding noted slight  Looks like venous bleeding no septal hematoma  Nose: Nose normal       Mouth/Throat:      Mouth: Mucous membranes are moist    Eyes:      Extraocular Movements: Extraocular movements intact  Pupils: Pupils are equal, round, and reactive to light  Cardiovascular:      Rate and Rhythm: Normal rate and regular rhythm  Pulmonary:      Effort: Pulmonary effort is normal       Breath sounds: Normal breath sounds  Abdominal:      General: Abdomen is flat  Bowel sounds are normal       Palpations: Abdomen is soft  Musculoskeletal:         General: Normal range of motion        Cervical back: Normal range of motion and neck supple  Skin:     General: Skin is warm  Capillary Refill: Capillary refill takes less than 2 seconds  Neurological:      General: No focal deficit present  Mental Status: He is alert and oriented to person, place, and time  Mental status is at baseline  Psychiatric:         Mood and Affect: Mood normal          Thought Content: Thought content normal          Vital Signs  ED Triage Vitals [07/16/22 1244]   Temperature Pulse Respirations Blood Pressure SpO2   (!) 96 5 °F (35 8 °C) 70 18 113/54 97 %      Temp Source Heart Rate Source Patient Position - Orthostatic VS BP Location FiO2 (%)   Tympanic Monitor Sitting Right arm --      Pain Score       --           Vitals:    07/16/22 1300 07/16/22 1315 07/16/22 1330 07/16/22 1345   BP: 114/56 143/68 121/57 128/62   Pulse: 70 70 70 70   Patient Position - Orthostatic VS:             Visual Acuity      ED Medications  Medications   oxymetazoline (AFRIN) 0 05 % nasal spray 2 spray (2 sprays Each Nare Given 7/16/22 1300)   silver nitrate-potassium nitrate (ARZOL SILVER NITRATE) 40-96 % applicator 1 applicator (1 applicator Topical Given 7/16/22 1300)   Lidocaine Viscous HCl (XYLOCAINE) 2 % mucosal solution 15 mL (15 mL Swish & Swallow Given 7/16/22 1315)       Diagnostic Studies  Results Reviewed     None                 No orders to display              Procedures  Epistaxis management    Date/Time: 7/16/2022 3:13 PM  Performed by: Hattie Michel DO  Authorized by: Hattie Michel DO   Universal Protocol:  Procedure performed by:  Consent: Verbal consent obtained  Consent given by: patient  Patient identity confirmed: verbally with patient      Patient location:  ED  Anesthesia (see MAR for exact dosages): Anesthesia method:  Topical application    Topical anesthesia: viscous lidocaine    Procedure details:     Treatment site:  L anterior    Hemostasis method:  Silver nitrate and rhino rocket    Approach:  External    Treatment complexity: Limited    Treatment episode: initial    Post-procedure details:     Assessment:  No improvement    Patient tolerance of procedure: Tolerated well, no immediate complications             ED Course                                             MDM  Number of Diagnoses or Management Options  Patient Progress  Patient progress: stable      Disposition  Final diagnoses:   Epistaxis     Time reflects when diagnosis was documented in both MDM as applicable and the Disposition within this note     Time User Action Codes Description Comment    7/16/2022  1:37 PM Juliet Sams Add [R04 0] Epistaxis       ED Disposition     ED Disposition   Discharge    Condition   Stable    Date/Time   Sat Jul 16, 2022  1:37 PM    Comment   [facility] discharge to home/self care                 Follow-up Information     Follow up With Specialties Details Why Contact Info Additional [address], DO Family Medicine Schedule an appointment as soon as possible for a visit   [address] [place] [address] Emergency Department Emergency Medicine  If symptoms worsen [address] [address] Emergency Department, [facility], [facility]          Discharge Medication List as of 7/16/2022  1:38 PM      START taking these medications    Details   amoxicillin-clavulanate (AUGMENTIN) 875-125 mg per tablet Take 1 tablet by mouth every 12 (twelve) hours for 5 days, Starting Sat 7/16/2022, Until Thu 7/21/2022, Normal         CONTINUE these medications which have NOT CHANGED    Details   acetaminophen (TYLENOL) 325 mg tablet Take 650 mg by mouth every 6 (six) hours as needed, Historical Med      albuterol (Ventolin HFA) 90 mcg/act inhaler Inhale 2 puffs every 6 (six) hours as needed for wheezing, Starting Tue 9/29/2020, Normal      Albuterol Sulfate (albuterol, FOR EMS ONLY,) (2 5 mg/3 mL) 0 083 % nebulizer solution Take 2 5 mg by nebulization every 6 (six) hours as needed for wheezing, Historical Med      allopurinol (ZYLOPRIM) 100 mg tablet Take 2 tablets (200 mg total) by mouth daily, Starting Sat 1/15/2022, Normal      ascorbic acid (VITAMIN C) 500 mg tablet Take 500 mg by mouth daily  , Historical Med      B Complex Vitamins (B COMPLEX 1 PO) Take 1 tablet by mouth daily  , Historical Med      Biotin 5 MG CAPS Take 1,000 mcg by mouth daily  , Historical Med      calcium carbonate-vitamin D (OSCAL-D) 500 mg-200 units per tablet Take 2 tablets by mouth daily at bedtime  , Historical Med      cholecalciferol (VITAMIN D3) 1,000 units tablet Take 1,000 Units by mouth daily, Historical Med      clopidogrel (Plavix) 75 mg tablet Take 1 tablet (75 mg total) by mouth daily, Starting Wed 2/23/2022, Normal      Cranberry 1000 MG CAPS Take 1 tablet by mouth 2 (two) times a day , Historical Med      cyanocobalamin 1000 MCG tablet Take 100 mcg by mouth daily, Historical Med      Dapagliflozin Propanediol 5 MG TABS Take by mouth daily, Historical Med      dextromethorphan-guaiFENesin (ROBITUSSIN DM)  mg/5 mL syrup Take 10 mL by mouth every 4 (four) hours as needed for cough, Starting Wed 6/26/2019, Normal      docusate sodium (COLACE) 100 mg capsule Take 1 capsule (100 mg total) by mouth 2 (two) times a day, Starting Mon 1/31/2022, Normal      DULoxetine (CYMBALTA) 60 mg delayed release capsule TAKE ONE CAPSULE BY MOUTH EVERY DAY, Normal      felodipine (PLENDIL) 5 mg 24 hr tablet Take 1 tablet (5 mg total) by mouth daily, Starting Wed 2/23/2022, Normal      Ferrous Sulfate (IRON PO) Take by mouth, Historical Med      finasteride (PROSCAR) 5 mg tablet Take 1 tablet (5 mg total) by mouth daily, Starting Fri 6/25/2021, Normal      Flaxseed, Linseed, (EQL FLAX SEED OIL) 1000 MG CAPS Take by mouth daily  , Historical Med      fluticasone-umeclidinium-vilanterol (Trelegy Ellipta) 200-62 5-25 MCG/INH AEPB inhaler Inhale 1 puff daily Rinse mouth after use , Starting Fri 12/17/2021, Normal      !! furosemide (LASIX) 20 mg tablet Take 1 tablet (20 mg total) by mouth every evening, Starting Mon 2/7/2022, No Print      !! furosemide (LASIX) 40 mg tablet 1 TABLET BY MOUTH IN THE MORNING AND 1/2 TABLET IN THE EVENING, Normal      polyethylene glycol (MIRALAX) 17 g packet Take 17 g by mouth daily as needed, Historical Med      rivaroxaban (XARELTO) 15 mg tablet Take 1 tablet (15 mg total) by mouth daily with breakfast, Starting Sat 4/13/2019, No Print      senna (SENOKOT) 8 6 MG tablet Take 1 tablet by mouth daily, Historical Med      simvastatin (ZOCOR) 20 mg tablet TAKE 1 TABLET DAILY AT     BEDTIME, Normal      spironolactone (ALDACTONE) 25 mg tablet Take 25 mg by mouth daily, Historical Med      Wound Dressings ([facility] Wound/Burn Dressing) GEL Apply topically if needed Apply to right buttock as needed, Historical Med       !! - Potential duplicate medications found  Please discuss with provider  No discharge procedures on file      PDMP Review       Value Time User    PDMP Reviewed  Yes 2/19/2022 10:29 AM Gisselle Winston, [facility] Provider  Electronically Signed by           Jaleesa Rutledge DO  07/16/22 2781

## 2022-07-18 ENCOUNTER — HOSPITAL ENCOUNTER (EMERGENCY)
Facility: HOSPITAL | Age: 82
Discharge: HOME | End: 2022-07-18
Attending: EMERGENCY MEDICINE
Payer: MEDICARE

## 2022-07-18 VITALS
RESPIRATION RATE: 20 BRPM | SYSTOLIC BLOOD PRESSURE: 120 MMHG | TEMPERATURE: 98.1 F | HEART RATE: 75 BPM | DIASTOLIC BLOOD PRESSURE: 60 MMHG | OXYGEN SATURATION: 95 %

## 2022-07-18 DIAGNOSIS — R04.0 EPISTAXIS: Primary | ICD-10-CM

## 2022-07-18 PROCEDURE — 99284 EMERGENCY DEPT VISIT MOD MDM: CPT | Performed by: EMERGENCY MEDICINE

## 2022-07-18 PROCEDURE — 99282 EMERGENCY DEPT VISIT SF MDM: CPT

## 2022-07-18 PROCEDURE — 30901 CONTROL OF NOSEBLEED: CPT | Performed by: EMERGENCY MEDICINE

## 2022-07-18 RX ORDER — LIDOCAINE HYDROCHLORIDE 20 MG/ML
15 SOLUTION OROPHARYNGEAL ONCE
Status: COMPLETED | OUTPATIENT
Start: 2022-07-18 | End: 2022-07-18

## 2022-07-18 RX ORDER — AMOXICILLIN AND CLAVULANATE POTASSIUM 875; 125 MG/1; MG/1
1 TABLET, FILM COATED ORAL EVERY 12 HOURS
Qty: 10 TABLET | Refills: 0 | Status: SHIPPED | OUTPATIENT
Start: 2022-07-18 | End: 2022-07-22 | Stop reason: SDUPTHER

## 2022-07-18 RX ORDER — OXYMETAZOLINE HYDROCHLORIDE 0.05 G/100ML
2 SPRAY NASAL ONCE
Status: COMPLETED | OUTPATIENT
Start: 2022-07-18 | End: 2022-07-18

## 2022-07-18 RX ADMIN — OXYMETAZOLINE HYDROCHLORIDE 2 SPRAY: 0.05 SPRAY NASAL at 12:59

## 2022-07-18 RX ADMIN — LIDOCAINE HYDROCHLORIDE 15 ML: 20 SOLUTION ORAL; TOPICAL at 12:59

## 2022-07-18 NOTE — ED PROVIDER NOTES
History  Chief Complaint   Patient presents with    Wound Check     Here to have packing removed from nose      69-year-old male presents for recheck of his nasal packing for epistaxis that was done 2 days ago in the ED here  He is on Xarelto  No complaints at this time  History provided by:  Patient   used: No        Prior to Admission Medications   Prescriptions Last Dose Informant Patient Reported? Taking? Albuterol Sulfate (albuterol, FOR EMS ONLY,) (2 5 mg/3 mL) 0 083 % nebulizer solution   Yes No   Sig: Take 2 5 mg by nebulization every 6 (six) hours as needed for wheezing   B Complex Vitamins (B COMPLEX 1 PO)  Child Yes No   Sig: Take 1 tablet by mouth daily  Biotin 5 MG CAPS  Child Yes No   Sig: Take 1,000 mcg by mouth daily  Cranberry 1000 MG CAPS  Child Yes No   Sig: Take 1 tablet by mouth 2 (two) times a day  DULoxetine (CYMBALTA) 60 mg delayed release capsule   No No   Sig: TAKE ONE CAPSULE BY MOUTH EVERY DAY   DULoxetine (CYMBALTA) 60 mg delayed release capsule   No No   Sig: Take 1 capsule (60 mg total) by mouth 2 (two) times a day   Dapagliflozin Propanediol 5 MG TABS   Yes No   Sig: Take by mouth daily   Ferrous Sulfate (IRON PO)   Yes No   Sig: Take by mouth   Flaxseed, Linseed, (EQL FLAX SEED OIL) 1000 MG CAPS  Child Yes No   Sig: Take by mouth daily     Wound Dressings (Medihoney Wound/Burn Dressing) GEL   Yes No   Sig: Apply topically if needed Apply to right buttock as needed   acetaminophen (TYLENOL) 325 mg tablet   Yes No   Sig: Take 650 mg by mouth every 6 (six) hours as needed   albuterol (Ventolin HFA) 90 mcg/act inhaler   No No   Sig: Inhale 2 puffs every 6 (six) hours as needed for wheezing   Patient not taking: No sig reported   allopurinol (ZYLOPRIM) 100 mg tablet   No No   Sig: Take 2 tablets (200 mg total) by mouth daily   amoxicillin-clavulanate (AUGMENTIN) 875-125 mg per tablet   No No   Sig: Take 1 tablet by mouth every 12 (twelve) hours for 5 days   amoxicillin-clavulanate (AUGMENTIN) 875-125 mg per tablet   No Yes   Sig: Take 1 tablet by mouth every 12 (twelve) hours for 5 days   ascorbic acid (VITAMIN C) 500 mg tablet  Child Yes No   Sig: Take 500 mg by mouth daily  calcium carbonate-vitamin D (OSCAL-D) 500 mg-200 units per tablet  Child Yes No   Sig: Take 2 tablets by mouth daily at bedtime  cholecalciferol (VITAMIN D3) 1,000 units tablet  Child Yes No   Sig: Take 1,000 Units by mouth daily   clopidogrel (Plavix) 75 mg tablet   No No   Sig: Take 1 tablet (75 mg total) by mouth daily   cyanocobalamin 1000 MCG tablet  Child Yes No   Sig: Take 100 mcg by mouth daily   dextromethorphan-guaiFENesin (ROBITUSSIN DM)  mg/5 mL syrup   No No   Sig: Take 10 mL by mouth every 4 (four) hours as needed for cough   Patient not taking: No sig reported   docusate sodium (COLACE) 100 mg capsule   No No   Sig: Take 1 capsule (100 mg total) by mouth 2 (two) times a day   felodipine (PLENDIL) 5 mg 24 hr tablet   No No   Sig: Take 1 tablet (5 mg total) by mouth daily   finasteride (PROSCAR) 5 mg tablet  Child No No   Sig: Take 1 tablet (5 mg total) by mouth daily   fluticasone-umeclidinium-vilanterol (Trelegy Ellipta) 200-62 5-25 MCG/INH AEPB inhaler   No No   Sig: Inhale 1 puff daily Rinse mouth after use     Patient not taking: No sig reported   furosemide (LASIX) 20 mg tablet   No No   Sig: Take 1 tablet (20 mg total) by mouth every evening   furosemide (LASIX) 40 mg tablet   No No   Si TABLET BY MOUTH IN THE MORNING AND 1/2 TABLET IN THE EVENING   polyethylene glycol (MIRALAX) 17 g packet   Yes No   Sig: Take 17 g by mouth daily as needed   rivaroxaban (XARELTO) 15 mg tablet   No No   Sig: Take 1 tablet (15 mg total) by mouth daily with breakfast   Patient taking differently: Take 20 mg by mouth daily at bedtime   senna (SENOKOT) 8 6 MG tablet   Yes No   Sig: Take 1 tablet by mouth daily   simvastatin (ZOCOR) 20 mg tablet   No No   Sig: TAKE 1 TABLET DAILY AT     BEDTIME   spironolactone (ALDACTONE) 25 mg tablet   Yes No   Sig: Take 25 mg by mouth daily      Facility-Administered Medications: None       Past Medical History:   Diagnosis Date    Arthritis     Atrial flutter (HCC)     Chronic kidney disease     stage 3    Coronary artery disease     2 stents    Fluid retention     Gout     Heart failure ([facility] [address] )     pacemaker    Hypertension     Hyponatremia 4/8/2019    Pacemaker     Pulmonary emphysema ([facility] )     Radiculopathy     last assessed 1/28/16     Shortness of breath     exertion    Sleep apnea     c pap       Past Surgical History:   Procedure Laterality Date    ANGIOPLASTY      x2 2 stents and then replaced   [address]      pacemaker permanent placement dual chamber / last assessed 4/7/14 / implantation     CARDIAC SURGERY      pacemaker    CHOLECYSTECTOMY      CORONARY ANGIOPLASTY WITH STENT PLACEMENT      EPIDURAL BLOCK INJECTION N/A 5/26/2016    Procedure: BLOCK / INJECTION EPIDURAL STEROID LUMBAR  L4-5;  Surgeon: Bismark Bonner MD;  Location: [place] [address] MAIN OR;  Service:     EPIDURAL BLOCK INJECTION N/A 2/14/2019    Procedure: L4 L5 Lumbar Epidural Steroid Injection;  Surgeon: Bismark Bonner MD;  Location: [place] [address] MAIN OR;  Service: Pain Management     EYE SURGERY      cataract left    HIP PINNING Left     KNEE ARTHROSCOPY W/ MENISCAL REPAIR Left     LUMBAR EPIDURAL INJECTION N/A 3/17/2016    Procedure: BLOCK / INJECTION LUMBAR  L4-5  (C-ARM); Surgeon: Bismark Bonner MD;  Location: [facility] MAIN OR;  Service:     IN OPEN RX FEMUR FX+INTRAMED ELLA Right 1/31/2022    Procedure: INSERTION NAIL IM FEMUR ANTEGRADE (TROCHANTERIC);   Surgeon: Emmie Santiago MD;  [facility] Main [facility];  Service: Orthopedics       Family History   Problem Relation Age of Onset    Cancer Mother 80    Heart disease Mother     Hypertension Mother     Heart disease Father     Diabetes Neg Hx     Stroke Neg Hx      I have reviewed and agree with the history as documented  E-Cigarette/Vaping    E-Cigarette Use Former User      E-Cigarette/Vaping Substances    Nicotine No     THC No     CBD No     Flavoring No     Other No     Unknown No      Social History     Tobacco Use    Smoking status: Current Every Day Smoker     Packs/day: 0 25     Years: 50 00     Pack years: 12 50     Types: Cigarettes     Last attempt to quit: 3/27/2019     Years since quitting: 3 3    Smokeless tobacco: Never Used    Tobacco comment: quit 02/14/2021   Vaping Use    Vaping Use: Former   Substance Use Topics    Alcohol use: Never    Drug use: No       Review of Systems   Constitutional: Negative  HENT: Positive for nosebleeds  Eyes: Negative  Respiratory: Negative  Cardiovascular: Negative  Gastrointestinal: Negative  Endocrine: Negative  Genitourinary: Negative  Musculoskeletal: Negative  Skin: Negative  Allergic/Immunologic: Negative  Neurological: Negative  Hematological: Negative  Psychiatric/Behavioral: Negative  All other systems reviewed and are negative  Physical Exam  Physical Exam  Constitutional:       Appearance: Normal appearance  HENT:      Head: Normocephalic and atraumatic  Comments: Left nare packing in place  No bleeding noted  Nose: Nose normal       Mouth/Throat:      Mouth: Mucous membranes are moist    Eyes:      Extraocular Movements: Extraocular movements intact  Pupils: Pupils are equal, round, and reactive to light  Cardiovascular:      Rate and Rhythm: Normal rate and regular rhythm  Pulmonary:      Effort: Pulmonary effort is normal       Breath sounds: Normal breath sounds  Abdominal:      General: Abdomen is flat  Bowel sounds are normal       Palpations: Abdomen is soft  Musculoskeletal:         General: Normal range of motion  Cervical back: Normal range of motion and neck supple  Skin:     General: Skin is warm        Capillary Refill: Capillary refill takes less than 2 seconds  Neurological:      General: No focal deficit present  Mental Status: He is alert and oriented to person, place, and time  Mental status is at baseline  Psychiatric:         Mood and Affect: Mood normal          Thought Content: Thought content normal          Vital Signs  ED Triage Vitals [07/18/22 1230]   Temperature Pulse Respirations Blood Pressure SpO2   98 1 °F (36 7 °C) 75 20 120/60 95 %      Temp Source Heart Rate Source Patient Position - Orthostatic VS BP Location FiO2 (%)   Temporal Monitor Lying Left arm --      Pain Score       --           Vitals:    07/18/22 1230   BP: 120/60   Pulse: 75   Patient Position - Orthostatic VS: Lying         Visual Acuity      ED Medications  Medications   oxymetazoline (AFRIN) 0 05 % nasal spray 2 spray (2 sprays Each Nare Given 7/18/22 1259)   Lidocaine Viscous HCl (XYLOCAINE) 2 % mucosal solution 15 mL (15 mL Swish & Swallow Given 7/18/22 1259)       Diagnostic Studies  Results Reviewed     None                 No orders to display              Procedures  Epistaxis management    Date/Time: 7/18/2022 1:20 PM  Performed by: Jaleesa Rutledge DO  Authorized by: Jaleesa Rutledge DO   Universal Protocol:  Procedure performed by:  Consent: Verbal consent obtained  Consent given by: patient  Patient identity confirmed: verbally with patient      Patient location:  ED  Anesthesia (see MAR for exact dosages): Anesthesia method:  Topical application    Local Therapeutics:  Lidocaine gel  Procedure details:     Treatment site:  L anterior    Treatment complexity:  Limited    Treatment episode: recurring    Post-procedure details:     Assessment:  Bleeding stopped    Patient tolerance of procedure:   Tolerated well, no immediate complications             ED Course                                             MDM  Number of Diagnoses or Management Options  Epistaxis  Diagnosis management comments: [facility]  Repacked his nose  Renewed his prescription for 5 more days  For the Augmentin  Patient Progress  Patient progress: stable      Disposition  Final diagnoses:   Epistaxis     Time reflects when diagnosis was documented in both MDM as applicable and the Disposition within this note     Time User Action Codes Description Comment    7/18/2022  1:26 PM Licia Kehr Add [R04 0] Epistaxis       ED Disposition     ED Disposition   Discharge    Condition   Stable    Date/Time   Mon Jul 18, 2022  1:26 PM    Comment   [facility] discharge to home/self care                 Follow-up Information     Follow up With Specialties Details Why Contact Info Additional [address] [facility]   [facility]  [phone]       Pee Gayle DO Family Medicine   [address] [place] [address]  [address]       [facility] Emergency Department Emergency Medicine   [address] [address]  [address] [place] [address] Emergency Department, [place], [place], [address]          Discharge Medication List as of 7/18/2022  1:27 PM      CONTINUE these medications which have CHANGED    Details   amoxicillin-clavulanate (AUGMENTIN) 875-125 mg per tablet Take 1 tablet by mouth every 12 (twelve) hours for 5 days, Starting Mon 7/18/2022, Until Sat 7/23/2022, Normal         CONTINUE these medications which have NOT CHANGED    Details   acetaminophen (TYLENOL) 325 mg tablet Take 650 mg by mouth every 6 (six) hours as needed, Historical Med      albuterol (Ventolin HFA) 90 mcg/act inhaler Inhale 2 puffs every 6 (six) hours as needed for wheezing, Starting Tue 9/29/2020, Normal      Albuterol Sulfate (albuterol, FOR EMS ONLY,) (2 5 mg/3 mL) 0 083 % nebulizer solution Take 2 5 mg by nebulization every 6 (six) hours as needed for wheezing, Historical Med allopurinol (ZYLOPRIM) 100 mg tablet Take 2 tablets (200 mg total) by mouth daily, Starting Sat 1/15/2022, Normal      ascorbic acid (VITAMIN C) 500 mg tablet Take 500 mg by mouth daily  , Historical Med      B Complex Vitamins (B COMPLEX 1 PO) Take 1 tablet by mouth daily  , Historical Med      Biotin 5 MG CAPS Take 1,000 mcg by mouth daily  , Historical Med      calcium carbonate-vitamin D (OSCAL-D) 500 mg-200 units per tablet Take 2 tablets by mouth daily at bedtime  , Historical Med      cholecalciferol (VITAMIN D3) 1,000 units tablet Take 1,000 Units by mouth daily, Historical Med      clopidogrel (Plavix) 75 mg tablet Take 1 tablet (75 mg total) by mouth daily, Starting Wed 2/23/2022, Normal      Cranberry 1000 MG CAPS Take 1 tablet by mouth 2 (two) times a day , Historical Med      cyanocobalamin 1000 MCG tablet Take 100 mcg by mouth daily, Historical Med      Dapagliflozin Propanediol 5 MG TABS Take by mouth daily, Historical Med      dextromethorphan-guaiFENesin (ROBITUSSIN DM)  mg/5 mL syrup Take 10 mL by mouth every 4 (four) hours as needed for cough, Starting Wed 6/26/2019, Normal      docusate sodium (COLACE) 100 mg capsule Take 1 capsule (100 mg total) by mouth 2 (two) times a day, Starting Mon 1/31/2022, Normal      DULoxetine (CYMBALTA) 60 mg delayed release capsule TAKE ONE CAPSULE BY MOUTH EVERY DAY, Normal      felodipine (PLENDIL) 5 mg 24 hr tablet Take 1 tablet (5 mg total) by mouth daily, Starting Wed 2/23/2022, Normal      Ferrous Sulfate (IRON PO) Take by mouth, Historical Med      finasteride (PROSCAR) 5 mg tablet Take 1 tablet (5 mg total) by mouth daily, Starting Fri 6/25/2021, Normal      Flaxseed, Linseed, (EQL FLAX SEED OIL) 1000 MG CAPS Take by mouth daily  , Historical Med      fluticasone-umeclidinium-vilanterol (Trelegy Ellipta) 200-62 5-25 MCG/INH AEPB inhaler Inhale 1 puff daily Rinse mouth after use , Starting Fri 12/17/2021, Normal      !! furosemide (LASIX) 20 mg tablet Take 1 tablet (20 mg total) by mouth every evening, Starting Mon 2/7/2022, No Print      !! furosemide (LASIX) 40 mg tablet 1 TABLET BY MOUTH IN THE MORNING AND 1/2 TABLET IN THE EVENING, Normal      polyethylene glycol (MIRALAX) 17 g packet Take 17 g by mouth daily as needed, Historical Med      rivaroxaban (XARELTO) 15 mg tablet Take 1 tablet (15 mg total) by mouth daily with breakfast, Starting Sat 4/13/2019, No Print      senna (SENOKOT) 8 6 MG tablet Take 1 tablet by mouth daily, Historical Med      simvastatin (ZOCOR) 20 mg tablet TAKE 1 TABLET DAILY AT     BEDTIME, Normal      spironolactone (ALDACTONE) 25 mg tablet Take 25 mg by mouth daily, Historical Med      Wound Dressings ([facility] Wound/Burn Dressing) GEL Apply topically if needed Apply to right buttock as needed, Historical Med       !! - Potential duplicate medications found  Please discuss with provider  No discharge procedures on file      PDMP Review       Value Time User    PDMP Reviewed  Yes 2/19/2022 10:29 AM Rios Benjamin           Provider  Electronically Signed by           Caryn Greenwood DO  07/19/22 0877

## 2022-07-20 ENCOUNTER — OFFICE VISIT (OUTPATIENT)
Dept: OBGYN CLINIC | Facility: CLINIC | Age: 82
End: 2022-07-20
Payer: MEDICARE

## 2022-07-20 VITALS
WEIGHT: 270 LBS | HEIGHT: 72 IN | DIASTOLIC BLOOD PRESSURE: 70 MMHG | HEART RATE: 70 BPM | SYSTOLIC BLOOD PRESSURE: 120 MMHG | BODY MASS INDEX: 36.57 KG/M2

## 2022-07-20 DIAGNOSIS — M17.11 PRIMARY OSTEOARTHRITIS OF RIGHT KNEE: ICD-10-CM

## 2022-07-20 DIAGNOSIS — M24.562 FLEXION CONTRACTURE OF LEFT KNEE: ICD-10-CM

## 2022-07-20 DIAGNOSIS — M17.12 PRIMARY OSTEOARTHRITIS OF LEFT KNEE: Primary | ICD-10-CM

## 2022-07-20 PROCEDURE — 99213 OFFICE O/P EST LOW 20 MIN: CPT | Performed by: ORTHOPAEDIC SURGERY

## 2022-07-20 NOTE — PROGRESS NOTES
Assessment/Plan:  1  Primary osteoarthritis of left knee     2  Primary osteoarthritis of right knee     3  Flexion contracture of left knee       Scribe Attestation    I,:  Ariella Reeves PA-C am acting as a scribe while in the presence of the attending physician :       I,:  Ash Dowling DO personally performed the services described in this documentation    as scribed in my presence :         Ana Gomez is a pleasant 70-year-old gentleman presenting today for follow-up of his bilateral knees  He does have end-stage underlying osteoarthritis with significant flexion contractures  However, he is not having any pain at this time  We discussed that given his age and medical comorbidities, it is unlikely that he will ever be a candidate for total knee arthroplasty  We would recommend that he continue with conservative treatment  He completed viscosupplementation with Dr Keli Man at the end of March and will be eligible in the beginning of October  We discussed that since he is not having pain, there is no indication for injections at this time  We also discussed that due to his flexion contractures, it is likely that he will always need an ambulatory assistive device such as a cane or walker for gait stability due to his altered gait mechanics  He can continue with home physical therapy and return to our office on an as-needed basis  All questions addressed    Subjective: Bilateral knee follow up    Patient ID: Magdiel Palma is a 80 y o  male  Ana Gomez is a pleasant 70-year-old gentleman presenting today for follow-up of his bilateral knees  He reports that he has been working with home physical therapy for strengthening and improvement of gait mechanics  He does not have significant pain in his knee since completing viscosupplementation with Dr Keli Man on March 29th    He has been ambulating with a walker and a cane with his home physical therapist   He does still occasionally feels some instability in the left knee, but otherwise is unchanged      Review of Systems   Constitutional: Positive for activity change  HENT: Negative  Eyes: Negative  Respiratory: Negative  Cardiovascular: Negative  Gastrointestinal: Negative  Endocrine: Negative  Genitourinary: Negative  Musculoskeletal: Positive for arthralgias, gait problem, joint swelling and myalgias  Skin: Negative  Allergic/Immunologic: Negative  Hematological: Negative  Psychiatric/Behavioral: Negative        Past Medical History:   Diagnosis Date    Arthritis     Atrial flutter ([facility] )     Chronic kidney disease     stage 3    Coronary artery disease     2 stents    Fluid retention     Gout     Heart disease     Heart failure ([facility] )     pacemaker    Hypertension     Hyponatremia 04/08/2019    Neurological disorder     Pacemaker     Pulmonary emphysema ([facility] )     Radiculopathy     last assessed 1/28/16     Shortness of breath     exertion    Sleep apnea     c pap       Past Surgical History:   Procedure Laterality Date    ANGIOPLASTY      x2 2 stents and then replaced    CARDIAC PACEMAKER PLACEMENT      pacemaker permanent placement dual chamber / last assessed 4/7/14 / implantation     CARDIAC SURGERY      pacemaker    CHOLECYSTECTOMY      CORONARY ANGIOPLASTY WITH STENT PLACEMENT      EPIDURAL BLOCK INJECTION N/A 05/26/2016    Procedure: BLOCK / INJECTION EPIDURAL STEROID LUMBAR  L4-5;  Surgeon: Denis Mckinnon MD;  Location: [facility] MAIN OR;  Service:     EPIDURAL BLOCK INJECTION N/A 02/14/2019    Procedure: L4 L5 Lumbar Epidural Steroid Injection;  Surgeon: Denis Mckinnon MD;  Location: [facility] MAIN OR;  Service: Pain Management     EYE SURGERY      cataract left    HAND SURGERY      HIP PINNING Left     INSERT / REPLACE / [address] ARTHROSCOPY W/ MENISCAL REPAIR Left     KNEE SURGERY      LUMBAR EPIDURAL INJECTION N/A 03/17/2016    Procedure: BLOCK / INJECTION LUMBAR  L4-5  (C-ARM); Surgeon: Carlton Reeves MD;  Location: [facility] MAIN OR;  Service:     ID OPEN RX FEMUR FX+INTRAMED ELLA Right 2022    Procedure: INSERTION NAIL IM FEMUR ANTEGRADE (TROCHANTERIC); Surgeon: Maura Aguilar MD;  Location[facility] Main OR;  Service: Orthopedics       Family History   Problem Relation Age of Onset    Cancer Mother 80        Cancer when 80 yrs old   Grady Abt Heart disease Mother     Hypertension Mother     Heart disease Father     Cancer Father         Heart   46    Diabetes Neg Hx     Stroke Neg Hx        Social History     Occupational History    Occupation: RETIRED   Tobacco Use    Smoking status: Former Smoker     Packs/day: 0 25     Years: 50 00     Pack years: 12 50     Types: Cigarettes     Quit date: 3/27/2019     Years since quitting: 3 3    Smokeless tobacco: Former User    Tobacco comment: quit 2021   Vaping Use    Vaping Use: Former   Substance and Sexual Activity    Alcohol use: Never    Drug use: No    Sexual activity: Yes     Partners: Female     Birth control/protection: None         Current Outpatient Medications:     albuterol (Ventolin HFA) 90 mcg/act inhaler, Inhale 2 puffs every 6 (six) hours as needed for wheezing (Patient not taking: No sig reported), Disp: 3 Inhaler, Rfl: 3    Albuterol Sulfate (albuterol, FOR EMS ONLY,) (2 5 mg/3 mL) 0 083 % nebulizer solution, Take 2 5 mg by nebulization every 6 (six) hours as needed for wheezing, Disp: , Rfl:     allopurinol (ZYLOPRIM) 100 mg tablet, Take 2 tablets (200 mg total) by mouth daily, Disp: 180 tablet, Rfl: 3    amoxicillin-clavulanate (AUGMENTIN) 875-125 mg per tablet, Take 1 tablet by mouth every 12 (twelve) hours for 5 days, Disp: 10 tablet, Rfl: 0    ascorbic acid (VITAMIN C) 500 mg tablet, Take 500 mg by mouth daily  , Disp: , Rfl:     B Complex Vitamins (B COMPLEX 1 PO), Take 1 tablet by mouth daily  , Disp: , Rfl:     Biotin 5 MG CAPS, Take 1,000 mcg by mouth daily  , Disp: , Rfl:     calcium carbonate-vitamin D (OSCAL-D) 500 mg-200 units per tablet, Take 2 tablets by mouth daily at bedtime  , Disp: , Rfl:     cholecalciferol (VITAMIN D3) 1,000 units tablet, Take 1,000 Units by mouth daily, Disp: , Rfl:     clopidogrel (Plavix) 75 mg tablet, Take 1 tablet (75 mg total) by mouth daily, Disp: 90 tablet, Rfl: 1    Cranberry 1000 MG CAPS, Take 1 tablet by mouth 2 (two) times a day , Disp: , Rfl:     cyanocobalamin 1000 MCG tablet, Take 100 mcg by mouth daily, Disp: , Rfl:     Dapagliflozin Propanediol 5 MG TABS, Take by mouth daily, Disp: , Rfl:     dextromethorphan-guaiFENesin (ROBITUSSIN DM)  mg/5 mL syrup, Take 10 mL by mouth every 4 (four) hours as needed for cough (Patient not taking: No sig reported), Disp: 118 mL, Rfl: 0    docusate sodium (COLACE) 100 mg capsule, Take 1 capsule (100 mg total) by mouth 2 (two) times a day, Disp: 10 capsule, Rfl: 0    DULoxetine (CYMBALTA) 60 mg delayed release capsule, Take 1 capsule (60 mg total) by mouth 2 (two) times a day, Disp: 60 capsule, Rfl: 1    felodipine (PLENDIL) 5 mg 24 hr tablet, Take 1 tablet (5 mg total) by mouth daily, Disp: 90 tablet, Rfl: 1    Ferrous Sulfate (IRON PO), Take by mouth, Disp: , Rfl:     finasteride (PROSCAR) 5 mg tablet, Take 1 tablet (5 mg total) by mouth daily, Disp: 90 tablet, Rfl: 3    Flaxseed, Linseed, (EQL FLAX SEED OIL) 1000 MG CAPS, Take by mouth daily  , Disp: , Rfl:     fluticasone-umeclidinium-vilanterol (Trelegy Ellipta) 200-62 5-25 MCG/INH AEPB inhaler, Inhale 1 puff daily Rinse mouth after use   (Patient not taking: No sig reported), Disp: 180 blister, Rfl: 3    furosemide (LASIX) 20 mg tablet, Take 1 tablet (20 mg total) by mouth every evening, Disp: , Rfl: 0    furosemide (LASIX) 40 mg tablet, 1 TABLET BY MOUTH IN THE MORNING AND 1/2 TABLET IN THE EVENING, Disp: 135 tablet, Rfl: 2    polyethylene glycol (MIRALAX) 17 g packet, Take 17 g by mouth daily as needed, Disp: , Rfl:     rivaroxaban (XARELTO) 15 mg tablet, Take 1 tablet (15 mg total) by mouth daily with breakfast (Patient taking differently: Take 20 mg by mouth daily at bedtime), Disp: , Rfl:     simvastatin (ZOCOR) 20 mg tablet, TAKE 1 TABLET DAILY AT     BEDTIME, Disp: 90 tablet, Rfl: 1    spironolactone (ALDACTONE) 25 mg tablet, Take 25 mg by mouth daily, Disp: , Rfl:     No Known Allergies    Objective:  Vitals:    07/20/22 0905   BP: 120/70   Pulse: 70       Body mass index is 36 62 kg/m²  Right Knee Exam     Tenderness   The patient is experiencing tenderness in the medial joint line  Range of Motion   Extension: -10   Flexion: 110     Tests   Varus: negative Valgus: negative  Drawer:  Anterior - negative    Posterior - negative    Other   Erythema: absent  Scars: absent  Sensation: normal  Pulse: present  Effusion: no effusion present    Comments:  Vascular changes at the lower extremity      Left Knee Exam     Tenderness   The patient is experiencing tenderness in the medial joint line  Range of Motion   Left knee extension: -7    Flexion: 100     Tests   Varus: negative Valgus: negative  Drawer:  Anterior - negative     Posterior - negative    Other   Erythema: absent  Scars: present (anterior from previous surgery)  Sensation: normal  Pulse: present  Effusion: no effusion present    Comments:  Vascular changes at the lower extremity          Observations   Left Knee   Negative for effusion  Right Knee   Negative for effusion  Physical Exam  Vitals and nursing note reviewed  Constitutional:       Appearance: Normal appearance  He is well-developed  Comments: Body mass index is 36 62 kg/m²  In wheelchair   HENT:      Head: Normocephalic and atraumatic  Right Ear: External ear normal       Left Ear: External ear normal       Nose:      Comments: Plug in place for left-sided epistaxis  Eyes:      Extraocular Movements: Extraocular movements intact        Conjunctiva/sclera: Conjunctivae normal  Cardiovascular:      Rate and Rhythm: Normal rate  Pulses: Normal pulses  Pulmonary:      Effort: Pulmonary effort is normal    Abdominal:      Palpations: Abdomen is soft  Musculoskeletal:      Cervical back: Normal range of motion  Right knee: No effusion  Left knee: No effusion  Comments: See ortho exam   Skin:     General: Skin is warm and dry  Neurological:      General: No focal deficit present  Mental Status: He is alert and oriented to person, place, and time  Mental status is at baseline  Psychiatric:         Mood and Affect: Mood normal          Behavior: Behavior normal          Thought Content:  Thought content normal          Judgment: Judgment normal

## 2022-07-21 ENCOUNTER — DOCUMENTATION (OUTPATIENT)
Dept: NEPHROLOGY | Facility: CLINIC | Age: 82
End: 2022-07-21

## 2022-07-21 ENCOUNTER — OFFICE VISIT (OUTPATIENT)
Dept: NEPHROLOGY | Facility: CLINIC | Age: 82
End: 2022-07-21
Payer: MEDICARE

## 2022-07-21 VITALS
HEIGHT: 72 IN | BODY MASS INDEX: 37.52 KG/M2 | WEIGHT: 277 LBS | DIASTOLIC BLOOD PRESSURE: 58 MMHG | SYSTOLIC BLOOD PRESSURE: 120 MMHG | HEART RATE: 70 BPM

## 2022-07-21 DIAGNOSIS — R80.1 PERSISTENT PROTEINURIA: ICD-10-CM

## 2022-07-21 DIAGNOSIS — D63.1 ANEMIA IN STAGE 3A CHRONIC KIDNEY DISEASE (HCC): ICD-10-CM

## 2022-07-21 DIAGNOSIS — E55.9 VITAMIN D INSUFFICIENCY: ICD-10-CM

## 2022-07-21 DIAGNOSIS — N18.31 STAGE 3A CHRONIC KIDNEY DISEASE (HCC): ICD-10-CM

## 2022-07-21 DIAGNOSIS — N25.81 SECONDARY HYPERPARATHYROIDISM OF RENAL ORIGIN (HCC): ICD-10-CM

## 2022-07-21 DIAGNOSIS — R60.0 BILATERAL LEG EDEMA: ICD-10-CM

## 2022-07-21 DIAGNOSIS — N18.30 BENIGN HYPERTENSION WITH CHRONIC KIDNEY DISEASE, STAGE III (HCC): Primary | ICD-10-CM

## 2022-07-21 DIAGNOSIS — N18.31 ANEMIA IN STAGE 3A CHRONIC KIDNEY DISEASE (HCC): ICD-10-CM

## 2022-07-21 DIAGNOSIS — I12.9 BENIGN HYPERTENSION WITH CHRONIC KIDNEY DISEASE, STAGE III (HCC): Primary | ICD-10-CM

## 2022-07-21 LAB
BILIRUB UR QL STRIP: NEGATIVE
CHARACTER SMN: CLEAR
COLOR UR: YELLOW
CREAT ?TM UR-SCNC: 36.9 UMOL/L
EXT DIFF-ABS BASOPHILS: 0.1
EXT DIFF-ABS EOSINOPHILS: 0.2
EXT DIFF-ABS LYMPHOCYTES: 14
EXT DIFF-ABS MONOCYTES: 7
EXT DIFF-ABS NEUTROPHILS: 75
EXT GLUCOSE BLD: 127
EXT PROTEIN URINE: 5.7
EXTERNAL BUN: 21
EXTERNAL CALCIUM: 9.1
EXTERNAL CHLORIDE: 100
EXTERNAL CO2: 25
EXTERNAL CREATININE: 1.35
EXTERNAL EGFR: 52
EXTERNAL HEMATOCRIT: 33 %
EXTERNAL HEMOGLOBIN: 10.6 G/DL
EXTERNAL MCV: 93
EXTERNAL PLATELET COUNT: 233 K/ΜL
EXTERNAL POTASSIUM: 5.3
EXTERNAL RBC: 3.53
EXTERNAL RDW: 15.4
EXTERNAL SODIUM: 137
EXTERNAL VITAMIN D,25: 49.3
EXTERNAL WBC: 9.5
HGB UR QL STRIP.AUTO: NEGATIVE
KETONES UR STRIP-MCNC: NEGATIVE MG/DL
Lab: NORMAL
MAGNESIUM SERPL-MCNC: 2.3 MG/DL (ref 1.6–2.6)
NITRITE UR QL STRIP: NEGATIVE
PH SMN: 7 [PH]
PHOSPHATE SERPL-MCNC: 3.5 MG/DL (ref 2.3–4.1)
PROT UR STRIP-MCNC: NEGATIVE MG/DL
PROT/CREAT UR: 154 MG/G{CREAT}
PTH-INTACT SERPL-MCNC: 35 PG/ML
SP GR UR STRIP.AUTO: 1.01 (ref 1–1.03)
URATE SERPL-MCNC: 7.1 MG/DL (ref 3.5–8.5)
UROBILINOGEN UR QL STRIP.AUTO: 0.2 E.U./DL

## 2022-07-21 PROCEDURE — 99214 OFFICE O/P EST MOD 30 MIN: CPT | Performed by: INTERNAL MEDICINE

## 2022-07-21 NOTE — PATIENT INSTRUCTIONS
Potassium restricted diet  Take Lasix (water pill) two tablets twice daily for next 3 days and then go back to current 2 tablets in AM and 1 tablet in PM  Stop iron supplements due to constipation    Potassium Content of Foods List   WHAT YOU NEED TO KNOW:   What is potassium? Potassium is a mineral that is found in most foods  Potassium helps to balance fluids and minerals in your body  It also helps your body maintain a normal blood pressure  Potassium helps your muscles contract and your nerves function normally  Why do I need to change the amount of potassium I eat? You may need more potassium  if you have hypokalemia (low potassium levels) or high blood pressure  You may also need more potassium if you are taking diuretics  Diuretics and certain medicines cause your body to lose potassium  You may need less potassium  in your diet if you have hyperkalemia (high potassium levels) or kidney disease  How much potassium does fruit contain? The amount of potassium in milligrams (mg) contained in each fruit or serving of fruit is listed beside the item  High-potassium foods (more than 200 mg per serving):      1 medium banana (425)    ½ of a papaya (390)    ½ cup of prune juice (370)    ¼ cup of raisins (270)    1 medium jordy (325) or kiwi (240)    1 small orange (240) or ½ cup of orange juice (235)    ½ cup of cubed cantaloupe (215) or diced honeydew melon (200)    1 medium pear (200)    Medium-potassium foods (50 to 200 mg per serving):      1 medium peach (185)    1 small apple or ½ cup of apple juice (150)    ½ cup of peaches canned in juice (120)    ½ cup of canned pineapple (100)    ½ cup of fresh, sliced strawberries (631)    ½ cup of watermelon (85)    Low-potassium foods (less than 50 mg per serving):      ½ cup of cranberries (45) or cranberry juice cocktail (20)    ½ cup of nectar of papaya, jordy, or pear (35)    How much potassium do vegetables contain?    High-potassium foods (more than 200 mg per serving):      1 medium baked potato, with skin (925)    1 baked medium sweet potato, with skin (450)    ½ cup of tomato or vegetable juice (275), or 1 medium raw tomato (290)    ½ cup of mushrooms (280)    ½ cup of fresh brussels sprouts (250)    ½ cup of cooked zucchini (220) or winter squash (250)    ¼ of a medium avocado (245)    ½ cup of broccoli (230)    Medium-potassium foods (50 to 200 mg per serving):      ½ cup of corn (195)    ½ cup of fresh or cooked carrots (180)    ½ cup of fresh cauliflower (150)    ½ cup of asparagus (155)    ½ cup of canned peas (90)     1 cup of lettuce, all types (100)    ½ cup of fresh green beans (90)    ½ cup of frozen green beans (85)    ½ cup of cucumber (80)    How much potassium do protein foods contain? High-potassium foods (more than 200 mg per serving):      ½ cup of cooked mcgrath beans (400) or lentils (365)    1 cup of soy milk (300)    3 ounces of baked or broiled salmon (319)    3 ounces of roasted turkey, dark meat (250)    ¼ cup of sunflower seeds (241)    3 ounces of cooked lean beef (224)    2 tablespoons of smooth peanut butter (210)    Medium-potassium foods (50 to 200 mg per serving):      1 ounce of salted peanuts, almonds, or cashews (200)    1 large egg (60)    How much potassium do dairy foods contain? High-potassium foods (more than 200 mg per serving):      6 ounces of yogurt (260 to 435)    1 cup of nonfat, low-fat, or whole milk (350 to 380)    Medium-potassium foods (50 to 200 mg per serving):      ½ cup of ricotta cheese (154)    ½ cup of vanilla ice cream (131)    ½ cup of low-fat (2%) cottage cheese (110)    Low-potassium foods (less than 50 mg per serving):      1 ounce of cheese (20 to 30)      How much potassium do grains contain? 1 slice of white bread (30)    ½ cup of white or brown rice (50)    ½ cup of spaghetti or macaroni (30)    1 flour or corn tortilla (50)    1 four-inch waffle (50)    What other foods contain potassium?    1 tablespoon of molasses (295)    1½ ounces of chocolate (165)    Some salt substitutes may contain a high amount of potassium  Check the food label to find the amount of potassium it contains  CARE AGREEMENT:   You have the right to help plan your care  Discuss treatment options with your healthcare provider to decide what care you want to receive  You always have the right to refuse treatment  The above information is an  only  It is not intended as medical advice for individual conditions or treatments  Talk to your doctor, nurse or pharmacist before following any medical regimen to see if it is safe and effective for you  © Copyright [address] 2022 Information is for End User's use only and may not be sold, redistributed or otherwise used for commercial purposes   All illustrations and images included in CareNotes® are the copyrighted property of [employer]  or [address] [place], [place] [address]juan j

## 2022-07-21 NOTE — PROGRESS NOTES
NEPHROLOGY OUTPATIENT PROGRESS NOTE   Daly Morgan 80 y o  male MRN: [id number]  DATE: 7/21/2022  Reason for visit:   Chief Complaint   Patient presents with    Follow-up     CKD  3       ASSESSMENT and PLAN:  CKD stage III, baseline serum creatinine 1 3-1 5  -last creatinine 1 3 in July 2022 stable at baseline   -patient had episode of SHAWNEE with peak creatinine 2 1 during hospitalization in early 2022, suspect cardiorenal  -UA in July 2022 bland without hematuria or proteinuria  -CKD likely secondary to long-term hypertension causing hypertensive nephrosclerosis, age-related nephron loss, morbid obesity   -Avoid nephrotoxins or NSAIDs        Proteinuria, last urine microalbumin/creatinine ratio 36 mg in June 2021  Continue to monitor   - repeat urine microalbumin/creatinine ratio before next visit  -Could be secondary to underlying hypertension +/- obesity related secondary FSGS component      Microscopic hematuria, prior history of gross hematuria  -most recent UA bland as above  Patient denies any gross hematuria recently  - Continue urology follow-up      Hypertension  -BP well controlled in the office today  - Salt restricted diet   -currently on felodipine      Diastolic CHF  -echo in early 2022 shows EF 65%, severe aortic stenosis, moderate MS   -patient had volume overload/CHF exacerbation during hospitalization in early 2022  He is weight 291 lb improved to 284 lb on discharge 2/7/22   -since discharge seems to be losing weight 277 lb in March 2022 during orthopedic office visit, 267 lb in June 2022 during pulmonary office visit  More recently he has gained weight and weight at home seems to be around 275-278 lb lately  -currently on Lasix 40 mg in a m /20 mg in p m     Advised to take Lasix 40 mg b i d  for next two days and then continue current dosing   -follows with Cardiology regarding aortic stenosis    As per patient, he needed weight loss prior to surgical intervention       Secondary hyperparathyroidism,  last PTH 35 in July 2022   Repeat before next visit  Vitamin-D insufficiency, improved, last vitamin-D was 49 in July 2022  Repeat level before next visit     Hyperuricemia with history of gout  -serum uric acid overall improving 7 1 in July 2022  On allopurinol 200 mg daily   Repeat serum uric acid level before next visit     Morbid obesity  CAD, status post PTCA in April 2019 and again in May 2019      Anemia in CKD  -hemoglobin seems to have overall improved 10 6, continue to closely monitor  Check iron studies, hemoglobin before next visit  -having significant constipation, we will discontinue p o  iron supplement  If iron level remains low, consider IV Feraheme  Diagnoses and all orders for this visit:    Stage 3a chronic kidney disease ([facility] [address] )  -     Basic metabolic panel; Future  -     CBC; Future  -     Iron Saturation %; Future  -     Ferritin; Future  -     Magnesium; Future  -     Phosphorus; Future  -     PTH, intact; Future  -     Microalbumin / creatinine urine ratio; Future  -     Vitamin D 25 hydroxy; Future    Benign hypertension with chronic kidney disease, stage III (HCC)  -     Basic metabolic panel; Future  -     CBC; Future  -     Iron Saturation %; Future  -     Ferritin; Future  -     Magnesium; Future  -     Phosphorus; Future  -     PTH, intact; Future  -     Microalbumin / creatinine urine ratio; Future  -     Vitamin D 25 hydroxy; Future    Persistent proteinuria    Vitamin D insufficiency    Bilateral leg edema    Secondary hyperparathyroidism of renal origin (HCC)    Anemia in stage 3a chronic kidney disease (HCC)  -     CBC; Future  -     Iron Saturation %; Future  -     Ferritin;  Future          SUBJECTIVE / HPI:  Patient is 80year-old male with significant medical issues of hypertension for many years, CAD, status post PTCA, status post pacemaker placement, morbidly obese, CKD stage III with baseline creatinine 1 3-1 5, gout, AFib, JOVI on BiPAP, aortic stenosis, diastolic CHF comes for regular follow-up of CKD   He was hospitalized in early 2022 after fall and found to have a femur fracture, status post surgery  Also had fluid overload, CHF exacerbation and was diuresed  Currently remains on Lasix as mentioned above  Overall since discharge from the hospital he was losing weight although more recently has gained some weight back  Denies any worsening dyspnea  His leg edema seems to have much improved  Denies any nausea, vomiting  Denies any recent gout flare-up issues  REVIEW OF SYSTEMS:  More than 10 point review of systems were obtained and discussed in length with the patient  Complete review of systems were negative / unremarkable except mentioned above  PHYSICAL EXAM:  Vitals:    07/21/22 0859   BP: 120/58   BP Location: Left arm   Patient Position: Sitting   Cuff Size: Large   Pulse: 70   Weight: 126 kg (277 lb)   Height: 6' (1 829 m)     Body mass index is 37 57 kg/m²  Physical Exam  Vitals reviewed  Constitutional:       Appearance: He is well-developed  HENT:      Head: Normocephalic and atraumatic  Right Ear: External ear normal       Left Ear: External ear normal    Eyes:      General: No scleral icterus  Comments: Mild conjunctival pallor present   Cardiovascular:      Comments: S1, S2 present  Pulmonary:      Effort: Pulmonary effort is normal  No respiratory distress  Breath sounds: Normal breath sounds  No wheezing or rales  Abdominal:      General: Bowel sounds are normal  There is no distension  Palpations: Abdomen is soft  Tenderness: There is no abdominal tenderness  Musculoskeletal:         General: No deformity  Right lower leg: No edema  Left lower leg: No edema  Lymphadenopathy:      Cervical: No cervical adenopathy  Skin:     Findings: No rash  Neurological:      Mental Status: He is alert and oriented to person, place, and time     Psychiatric:         Behavior: Behavior normal          PAST MEDICAL HISTORY:  Past Medical History:   Diagnosis Date    Arthritis     Atrial flutter ([facility] [address] )     Chronic kidney disease     stage 3    Coronary artery disease     2 stents    Fluid retention     Gout     Heart disease     Heart failure ([facility] [address] )     pacemaker    Hypertension     Hyponatremia 04/08/2019    Neurological disorder     Pacemaker     [facility]     Radiculopathy     last assessed 1/28/16     Shortness of breath     exertion    Sleep apnea     c pap       PAST SURGICAL HISTORY:  Past Surgical History:   Procedure Laterality Date    ANGIOPLASTY      x2 2 stents and then replaced    CARDIAC PACEMAKER PLACEMENT      pacemaker permanent placement dual chamber / last assessed 4/7/14 / implantation     CARDIAC SURGERY      pacemaker    CHOLECYSTECTOMY      CORONARY ANGIOPLASTY WITH STENT PLACEMENT      EPIDURAL BLOCK INJECTION N/A 05/26/2016    Procedure: BLOCK / INJECTION EPIDURAL STEROID LUMBAR  L4-5;  Surgeon: June Nelson MD;  Location: [facility] MAIN OR;  Service:     EPIDURAL BLOCK INJECTION N/A 02/14/2019    Procedure: L4 L5 Lumbar Epidural Steroid Injection;  Surgeon: June Nelson MD;  Location: [facility] MAIN OR;  Service: Pain Management     EYE SURGERY      cataract left    HAND SURGERY      HIP PINNING Left     INSERT / REPLACE / [address] ARTHROSCOPY W/ MENISCAL REPAIR Left     KNEE SURGERY      LUMBAR EPIDURAL INJECTION N/A 03/17/2016    Procedure: BLOCK / INJECTION LUMBAR  L4-5  (C-ARM); Surgeon: June Nelson MD;  Location: [facility] MAIN OR;  Service:     AK OPEN RX FEMUR FX+INTRAMED ELLA Right 01/31/2022    Procedure: INSERTION NAIL IM FEMUR ANTEGRADE (TROCHANTERIC);   Surgeon: Marguerite Vaca MD;  Location[facility] Main OR;  Service: Orthopedics       SOCIAL HISTORY:  Social History     Substance and Sexual Activity   Alcohol Use Never     Social History     Substance and Sexual Activity   Drug Use No     Social History Tobacco Use   Smoking Status Former Smoker    Packs/day: 0 25    Years: 50 00    Pack years: 12 50    Types: Cigarettes    Quit date: 3/27/2019    Years since quitting: 3 3   Smokeless Tobacco Former User   Tobacco Comment    quit 2021       FAMILY HISTORY:  Family History   Problem Relation Age of Onset    Cancer Mother 80        Cancer when 80 yrs old   [facility] Heart disease Mother     Hypertension Mother     Heart disease Father     Cancer Father         Heart   46    Diabetes Neg Hx     Stroke Neg Hx        MEDICATIONS:    Current Outpatient Medications:     Albuterol Sulfate (albuterol, FOR EMS ONLY,) (2 5 mg/3 mL) 0 083 % nebulizer solution, Take 2 5 mg by nebulization every 6 (six) hours as needed for wheezing, Disp: , Rfl:     allopurinol (ZYLOPRIM) 100 mg tablet, Take 2 tablets (200 mg total) by mouth daily, Disp: 180 tablet, Rfl: 3    amoxicillin-clavulanate (AUGMENTIN) 875-125 mg per tablet, Take 1 tablet by mouth every 12 (twelve) hours for 5 days, Disp: 10 tablet, Rfl: 0    ascorbic acid (VITAMIN C) 500 mg tablet, Take 500 mg by mouth daily  , Disp: , Rfl:     B Complex Vitamins (B COMPLEX 1 PO), Take 1 tablet by mouth daily  , Disp: , Rfl:     Biotin 5 MG CAPS, Take 1,000 mcg by mouth daily  , Disp: , Rfl:     calcium carbonate-vitamin D (OSCAL-D) 500 mg-200 units per tablet, Take 2 tablets by mouth daily at bedtime  , Disp: , Rfl:     cholecalciferol (VITAMIN D3) 1,000 units tablet, Take 1,000 Units by mouth daily, Disp: , Rfl:     clopidogrel (Plavix) 75 mg tablet, Take 1 tablet (75 mg total) by mouth daily, Disp: 90 tablet, Rfl: 1    Cranberry 1000 MG CAPS, Take 1 tablet by mouth 2 (two) times a day , Disp: , Rfl:     cyanocobalamin 1000 MCG tablet, Take 100 mcg by mouth daily, Disp: , Rfl:     Dapagliflozin Propanediol 5 MG TABS, Take by mouth daily, Disp: , Rfl:     DULoxetine (CYMBALTA) 60 mg delayed release capsule, Take 1 capsule (60 mg total) by mouth 2 (two) times a day, Disp: 60 capsule, Rfl: 1    felodipine (PLENDIL) 5 mg 24 hr tablet, Take 1 tablet (5 mg total) by mouth daily, Disp: 90 tablet, Rfl: 1    finasteride (PROSCAR) 5 mg tablet, Take 1 tablet (5 mg total) by mouth daily, Disp: 90 tablet, Rfl: 3    Flaxseed, Linseed, (EQL FLAX SEED OIL) 1000 MG CAPS, Take by mouth daily  , Disp: , Rfl:     furosemide (LASIX) 20 mg tablet, Take 1 tablet (20 mg total) by mouth every evening, Disp: , Rfl: 0    polyethylene glycol (MIRALAX) 17 g packet, Take 17 g by mouth daily as needed, Disp: , Rfl:     rivaroxaban (XARELTO) 15 mg tablet, Take 1 tablet (15 mg total) by mouth daily with breakfast (Patient taking differently: Take 20 mg by mouth daily at bedtime), Disp: , Rfl:     simvastatin (ZOCOR) 20 mg tablet, TAKE 1 TABLET DAILY AT     BEDTIME, Disp: 90 tablet, Rfl: 1    spironolactone (ALDACTONE) 25 mg tablet, Take 25 mg by mouth daily, Disp: , Rfl:     albuterol (Ventolin HFA) 90 mcg/act inhaler, Inhale 2 puffs every 6 (six) hours as needed for wheezing (Patient not taking: No sig reported), Disp: 3 Inhaler, Rfl: 3    dextromethorphan-guaiFENesin (ROBITUSSIN DM)  mg/5 mL syrup, Take 10 mL by mouth every 4 (four) hours as needed for cough (Patient not taking: No sig reported), Disp: 118 mL, Rfl: 0    fluticasone-umeclidinium-vilanterol (Trelegy Ellipta) 200-62 5-25 MCG/INH AEPB inhaler, Inhale 1 puff daily Rinse mouth after use   (Patient not taking: No sig reported), Disp: 180 blister, Rfl: 3    Lab Results:   Creatinine 1 3

## 2022-07-22 ENCOUNTER — DOCUMENTATION (OUTPATIENT)
Dept: NEPHROLOGY | Facility: CLINIC | Age: 82
End: 2022-07-22

## 2022-07-22 LAB
EXTERNAL MAGNESIUM: 2.3
EXTERNAL PHOSPHORUS: 3.5
EXTERNAL URIC ACID: 7.1

## 2022-07-25 ENCOUNTER — OFFICE VISIT (OUTPATIENT)
Dept: NEUROSURGERY | Facility: CLINIC | Age: 82
End: 2022-07-25
Payer: MEDICARE

## 2022-07-25 VITALS — DIASTOLIC BLOOD PRESSURE: 60 MMHG | TEMPERATURE: 98.1 F | SYSTOLIC BLOOD PRESSURE: 112 MMHG | HEART RATE: 70 BPM

## 2022-07-25 DIAGNOSIS — R20.0 NUMBNESS AND TINGLING OF FOOT: Primary | ICD-10-CM

## 2022-07-25 DIAGNOSIS — R26.81 GAIT INSTABILITY: ICD-10-CM

## 2022-07-25 DIAGNOSIS — R20.2 NUMBNESS AND TINGLING OF FOOT: Primary | ICD-10-CM

## 2022-07-25 DIAGNOSIS — M54.16 RADICULOPATHY OF LUMBAR REGION: ICD-10-CM

## 2022-07-25 DIAGNOSIS — G62.9 NEUROPATHY: ICD-10-CM

## 2022-07-25 PROCEDURE — 99213 OFFICE O/P EST LOW 20 MIN: CPT | Performed by: PHYSICIAN ASSISTANT

## 2022-07-25 NOTE — PROGRESS NOTES
Neurosurgery Office Note  Kalee Banegas 80 y o  male MRN: [id number]      Assessment/Plan     Neuropathy  Patient presents today for evaluation of bilateral foot numbness as referred back to Neurosurgery from podiatry  · Patient last seen February 2020 with longstanding back pain and bilateral feet paresthesias  · Admits to prior injections with resolution of his pain but residual diffuse bilateral foot numbness and discomfort  Imaging:     Lumbar x-ray 1/29/2022:  Multilevel degenerative changes  No evidence of fracture  Plan:    Continue to monitor neurological symptoms    Discussed with patient's symptoms do not appear clearly dermatomal   Would recommend EMG testing which had been ordered prior to further assess etiology of his symptoms  New order has been placed  Patient may follow up with these results with his PCP or podiatry   Discussed with patient consideration for further lumbar imaging  He is unable to have an MRI secondary to pacemaker  Concern for CT myelogram given chronic kidney disease and GFR 52   o If EMG results do demonstrate lumbar radiculopathy and patient wishes to be considered for surgical intervention, could consider CT myelogram versus plain CT lumbar spine for further evaluation of his lumbar spine   At this time patient states that his symptoms are stable to minimally improved over the last several years  States that there is no associated pain and that his symptoms are tolerable   Given patient's multiple medical comorbidities as well as need for anticoagulation/anti-platelet agents and age, without clear correlation of symptoms in the setting of well toelrated symptoms in a high risk surgical patient, recommend deferring any surgical intervention  o Patient is agreeable that he is able to tolerate his symptoms and would defer surgery if possible     Discussed with patient to monitor for any progressive or new symptoms including weakness, radicular symptoms, bowel/bladder incontinence   We are happy to see patient to review EMG or on an as needed basis   He is aware to call the office with any questions/concerns  Diagnoses and all orders for this visit:    Numbness and tingling of foot  -     EMG 2 limb lower extremity; Future    Radiculopathy of lumbar region  -     Ambulatory Referral to Neurosurgery    Gait instability  -     Ambulatory Referral to Neurosurgery  -     EMG 2 limb lower extremity; Future    Neuropathy          I spent 30 minutes with the patient today in which >50% of the time was spent counseling/coordination of care regarding diagnosis, imaging review, symptoms and treatment plan  CHIEF COMPLAINT    Chief Complaint   Patient presents with    Follow-up       HISTORY    History of Present Illness       This an 49-year-old male past medical history significant for morbid obesity BMI 37 6, CAD status post stent x2 on Plavix and Xarelto, permanent pacemaker, hypertension, CKD stage 3, heart failure, emphysema and obstructive sleep apnea on CPAP who presents today for evaluation of bilateral foot paresthesias  Patient admits to longstanding history of neck pain shoulder pain as well as back pain  He endorses significant improvement of his symptoms with the use of acupuncture  Admits to injections with Dr Guillermo Lazaro approximately three years ago with improvement of his pain and increased ability to ambulate  He sustained a fall 2/2022 sustaining a femur fracture status post surgical intervention  Patient is currently receiving home therapy 3 times a week with good progress  Patient states he is walking approximately 400 ft with the use of a roller walker  States his distance is limited to feeling winded  Patient follows with Podiatry and given his paresthesias in his feet was referred to Neurosurgery for further evaluation      Patient complains of diffuse bilateral foot paresthesias and discomfort with some involvement of his ankles and lower leg  Denies any back pain or radicular lower extremity pain  Admits to some urinary incontinence but states he is followed for that  Endorses constipation which was felt to be related to iron supplement for anemia of chronic disease  Patient states over the last several years his feet feel about the same to minimally improved  He states his symptoms are tolerable and this is not inhibiting his ambulation  He does admit to having lack of motivation or drive to complete activities  Denies depression  REVIEW OF SYSTEMS    Review of Systems   HENT: Positive for hearing loss (hard of hearing)  Cardiovascular:        H/o Stents   Gastrointestinal: Positive for constipation  Genitourinary: Positive for enuresis, frequency and urgency  Musculoskeletal: Positive for gait problem  Negative for back pain (no back pain )  Physical therapy @ home x 3 days week (helpful)  PT with [facility] x 2-3 years, [facility] x 3 years ago     lst ov femur fracture surgery post op w/ Ortho 5/24/22    no LBP & numbness, weakness B/L feet--difficult walk (sometimes)  symptoms x couple of years & patient broke femur 1/2022-constipation & urinary incontience    Neurological: Positive for weakness (B/L feet ) and numbness (feet)  Hematological:        On Blood thinners- could not go through meds with me   Psychiatric/Behavioral: Positive for confusion (memory )  All other systems reviewed and are negative  ROS obtained by MA  See HPI       Meds/Allergies     Current Outpatient Medications   Medication Sig Dispense Refill    albuterol (Ventolin HFA) 90 mcg/act inhaler Inhale 2 puffs every 6 (six) hours as needed for wheezing (Patient not taking: No sig reported) 3 Inhaler 3    Albuterol Sulfate (albuterol, FOR EMS ONLY,) (2 5 mg/3 mL) 0 083 % nebulizer solution Take 2 5 mg by nebulization every 6 (six) hours as needed for wheezing      allopurinol (ZYLOPRIM) 100 mg tablet Take 2 tablets (200 mg total) by mouth daily 180 tablet 3    amoxicillin-clavulanate (AUGMENTIN) 875-125 mg per tablet Take 1 tablet by mouth every 12 (twelve) hours for 5 days 10 tablet 0    ascorbic acid (VITAMIN C) 500 mg tablet Take 500 mg by mouth daily   B Complex Vitamins (B COMPLEX 1 PO) Take 1 tablet by mouth daily   Biotin 5 MG CAPS Take 1,000 mcg by mouth daily   calcium carbonate-vitamin D (OSCAL-D) 500 mg-200 units per tablet Take 2 tablets by mouth daily at bedtime   cholecalciferol (VITAMIN D3) 1,000 units tablet Take 1,000 Units by mouth daily      clopidogrel (Plavix) 75 mg tablet Take 1 tablet (75 mg total) by mouth daily 90 tablet 1    Cranberry 1000 MG CAPS Take 1 tablet by mouth 2 (two) times a day   cyanocobalamin 1000 MCG tablet Take 100 mcg by mouth daily      Dapagliflozin Propanediol 5 MG TABS Take by mouth daily      dextromethorphan-guaiFENesin (ROBITUSSIN DM)  mg/5 mL syrup Take 10 mL by mouth every 4 (four) hours as needed for cough (Patient not taking: No sig reported) 118 mL 0    DULoxetine (CYMBALTA) 60 mg delayed release capsule Take 1 capsule (60 mg total) by mouth 2 (two) times a day 60 capsule 1    felodipine (PLENDIL) 5 mg 24 hr tablet Take 1 tablet (5 mg total) by mouth daily 90 tablet 1    finasteride (PROSCAR) 5 mg tablet Take 1 tablet (5 mg total) by mouth daily 90 tablet 3    Flaxseed, Linseed, (EQL FLAX SEED OIL) 1000 MG CAPS Take by mouth daily   fluticasone-umeclidinium-vilanterol (Trelegy Ellipta) 200-62 5-25 MCG/INH AEPB inhaler Inhale 1 puff daily Rinse mouth after use   (Patient not taking: No sig reported) 180 blister 3    furosemide (LASIX) 20 mg tablet Take 1 tablet (20 mg total) by mouth every evening  0    polyethylene glycol (MIRALAX) 17 g packet Take 17 g by mouth daily as needed      rivaroxaban (XARELTO) 15 mg tablet Take 1 tablet (15 mg total) by mouth daily with breakfast (Patient not taking: No sig reported)      simvastatin (ZOCOR) 20 mg tablet TAKE 1 TABLET DAILY AT     BEDTIME 90 tablet 1    spironolactone (ALDACTONE) 25 mg tablet Take 25 mg by mouth daily       No current facility-administered medications for this visit  No Known Allergies    PAST HISTORY    Past Medical History:   Diagnosis Date    Arthritis     [facility] flutter ([facility] )     Chronic kidney disease     stage 3    Coronary artery disease     2 stents    Fluid retention     Gout     Heart disease     Heart failure ([facility] )     pacemaker    Hypertension     Hyponatremia 04/08/2019    Neurological disorder     Pacemaker     Pulmonary emphysema ([facility] )     Radiculopathy     last assessed 1/28/16     Shortness of breath     exertion    Sleep apnea     c pap       Past Surgical History:   Procedure Laterality Date    ANGIOPLASTY      x2 2 stents and then replaced    CARDIAC PACEMAKER PLACEMENT      pacemaker permanent placement dual chamber / last assessed 4/7/14 / implantation     CARDIAC SURGERY      pacemaker    CHOLECYSTECTOMY      CORONARY ANGIOPLASTY WITH STENT PLACEMENT      EPIDURAL BLOCK INJECTION N/A 05/26/2016    Procedure: BLOCK / INJECTION EPIDURAL STEROID LUMBAR  L4-5;  Surgeon: Fabienne Mendoza MD;  Location: [facility] MAIN OR;  Service:     EPIDURAL BLOCK INJECTION N/A 02/14/2019    Procedure: L4 L5 Lumbar Epidural Steroid Injection;  Surgeon: Fabienne Mendoza MD;  Location: [facility] MAIN OR;  Service: Pain Management     EYE SURGERY      cataract left    HAND SURGERY      HIP PINNING Left     INSERT / REPLACE / [address] ARTHROSCOPY W/ MENISCAL REPAIR Left     KNEE SURGERY      LUMBAR EPIDURAL INJECTION N/A 03/17/2016    Procedure: BLOCK / INJECTION LUMBAR  L4-5  (C-ARM); Surgeon: Fabienne Mendoza MD;  Location: [facility] MAIN OR;  Service:     VT OPEN RX FEMUR FX+INTRAMED ELLA Right 01/31/2022    Procedure: INSERTION NAIL IM FEMUR ANTEGRADE (TROCHANTERIC);   Surgeon: Aron Pina MD;  Location[facility] Main [facility];  Service: Orthopedics       Social History     Tobacco Use    Smoking status: Current Every Day Smoker     Packs/day: 0 25     Years: 50 00     Pack years: 12 50     Types: Cigarettes     Start date: 2022    Smokeless tobacco: Former User    Tobacco comment: quit 2021   Vaping Use    Vaping Use: Former   Substance Use Topics    Alcohol use: Never    Drug use: No       Family History   Problem Relation Age of Onset    Cancer Mother 80        Cancer when 80 yrs old   Roman Heart disease Mother     Hypertension Mother     Heart disease Father     Cancer Father         Heart   46    Diabetes Neg Hx     Stroke Neg Hx          Above history personally reviewed  EXAM    Vitals:Blood pressure 112/60, pulse 70, temperature 98 1 °F (36 7 °C), temperature source Temporal ,There is no height or weight on file to calculate BMI  Physical Exam  Constitutional:       General: He is not in acute distress  Appearance: Normal appearance  He is well-developed  He is not ill-appearing  HENT:      Head: Normocephalic and atraumatic  Right Ear: External ear normal       Left Ear: External ear normal    Eyes:      General: No scleral icterus  Right eye: No discharge  Left eye: No discharge  Extraocular Movements: EOM normal       Conjunctiva/sclera: Conjunctivae normal    Cardiovascular:      Rate and Rhythm: Normal rate  Pulmonary:      Effort: Pulmonary effort is normal  No respiratory distress  Abdominal:      General: There is no distension  Musculoskeletal:      Cervical back: Normal range of motion and neck supple  Skin:     General: Skin is warm and dry  Neurological:      Mental Status: He is alert  Deep Tendon Reflexes: Strength normal       Reflex Scores:       Patellar reflexes are 1+ on the right side and 1+ on the left side    Psychiatric:         Mood and Affect: Mood normal          Speech: Speech normal          Behavior: Behavior normal          Thought Content: Thought content normal          Judgment: Judgment normal          Neurologic Exam     Mental Status   Follows 2 step commands  Attention: normal  Concentration: normal    Speech: speech is normal   Level of consciousness: alert  Knowledge: good  Normal comprehension  Cranial Nerves     CN III, IV, VI   Extraocular motions are normal    Conjugate gaze: present    CN VII   Facial expression full, symmetric  CN VIII   Hearing: intact    CN XII   Tongue: not atrophic  Fasciculations: absent    Motor Exam   Muscle bulk: normal  Overall muscle tone: normal    Strength   Strength 5/5 throughout  Sensory Exam   Right leg light touch: normal  Left leg light touch: normal  Right leg vibration: normal  Left leg vibration: normal  Right leg proprioception: normal  Left leg proprioception: normal    Gait, Coordination, and Reflexes     Tremor   Resting tremor: absent  Intention tremor: absent  Action tremor: absent    Reflexes   Right patellar: 1+  Left patellar: 1+  Right ankle clonus: absent  Left ankle clonus: absentJPS intact 3/3 b/l hallus          MEDICAL DECISION MAKING    Imaging Studies:     XR lumbar spine    I have personally reviewed pertinent reports     and I have personally reviewed pertinent films in PACS

## 2022-07-25 NOTE — ASSESSMENT & PLAN NOTE
Patient presents today for evaluation of bilateral foot numbness as referred back to Neurosurgery from podiatry  · Patient last seen February 2020 with longstanding back pain and bilateral feet paresthesias  · Admits to prior injections with resolution of his pain but residual diffuse bilateral foot numbness and discomfort  Imaging:     Lumbar x-ray 1/29/2022:  Multilevel degenerative changes  No evidence of fracture  Plan:    Continue to monitor neurological symptoms    Discussed with patient's symptoms do not appear clearly dermatomal   Would recommend EMG testing which had been ordered prior to further assess etiology of his symptoms  New order has been placed  Patient may follow up with these results with his PCP or podiatry   Discussed with patient consideration for further lumbar imaging  He is unable to have an MRI secondary to pacemaker  Concern for CT myelogram given chronic kidney disease and GFR 52   o If EMG results do demonstrate lumbar radiculopathy and patient wishes to be considered for surgical intervention, could consider CT myelogram versus plain CT lumbar spine for further evaluation of his lumbar spine   At this time patient states that his symptoms are stable to minimally improved over the last several years  States that there is no associated pain and that his symptoms are tolerable   Given patient's multiple medical comorbidities as well as need for anticoagulation/anti-platelet agents and age, without clear correlation of symptoms in the setting of well toelrated symptoms in a high risk surgical patient, recommend deferring any surgical intervention  o Patient is agreeable that he is able to tolerate his symptoms and would defer surgery if possible   Discussed with patient to monitor for any progressive or new symptoms including weakness, radicular symptoms, bowel/bladder incontinence   We are happy to see patient to review EMG or on an as needed basis   He is aware to call the office with any questions/concerns

## 2022-07-26 DIAGNOSIS — I25.10 CORONARY ARTERY ARTERIOSCLEROSIS: ICD-10-CM

## 2022-07-26 RX ORDER — CLOPIDOGREL BISULFATE 75 MG/1
75 TABLET ORAL DAILY
Qty: 90 TABLET | Refills: 1 | Status: SHIPPED | OUTPATIENT
Start: 2022-07-26

## 2022-08-04 ENCOUNTER — OFFICE VISIT (OUTPATIENT)
Dept: PODIATRY | Facility: CLINIC | Age: 82
End: 2022-08-04
Payer: MEDICARE

## 2022-08-04 DIAGNOSIS — M77.41 METATARSALGIA OF BOTH FEET: Primary | ICD-10-CM

## 2022-08-04 DIAGNOSIS — B35.1 ONYCHOMYCOSIS: ICD-10-CM

## 2022-08-04 DIAGNOSIS — I70.209 PERIPHERAL ARTERIOSCLEROSIS (HCC): ICD-10-CM

## 2022-08-04 DIAGNOSIS — B35.9 DERMATOPHYTOSIS: ICD-10-CM

## 2022-08-04 DIAGNOSIS — M77.42 METATARSALGIA OF BOTH FEET: Primary | ICD-10-CM

## 2022-08-04 DIAGNOSIS — M54.16 RADICULOPATHY OF LUMBAR REGION: ICD-10-CM

## 2022-08-04 PROCEDURE — 99213 OFFICE O/P EST LOW 20 MIN: CPT | Performed by: PODIATRIST

## 2022-08-04 NOTE — PROGRESS NOTES
Assessment/Plan:  Pain upon ambulation   Limb pain   Rule out radiculopathy   Edema secondary to venous insufficiency   Rule out degenerative disc disease   Probable spinal stenosis   Dermatophytosis   Mycosis of nail      Plan   Patient advised on condition   All nails debrided   Calluses debrided without pain or complication   Patient will follow up with pain management and acupuncture specialists   In addition all abnormal skin debrided without pain or complication    we will maintain Cymbalta dosing is 60 mg b i d        Patient has been referred to neuro surgery for workup and possible intervention for chronic radiculopathy       Subjective:  Patient complains of pain in his feet with ambulation   He has pain in his back and legs   He is doing well with acupuncture   He complains of pain with shoe wear   Patient is status post injury to right lower extremity   This required hospitalization  Michael Salazar is having increasing pain   He has pain in his leg and back   Patient is using walker                Past Medical History:   Diagnosis Date    Arthritis      Atrial flutter (HCC)      Chronic kidney disease       stage 3    Coronary artery disease       2 stents    Fluid retention      Gout      Heart failure ([address] )       pacemaker    Hypertension      Pacemaker      Pulmonary emphysema [address]      Radiculopathy       last assessed 1/28/16     Shortness of breath       exertion    Sleep apnea       c pap                   Past Surgical History:   Procedure Laterality Date    ANGIOPLASTY         x2 2 stents and then replaced    CARDIAC PACEMAKER PLACEMENT         pacemaker permanent placement dual chamber / last assessed 4/7/14 / implantation     CARDIAC SURGERY         pacemaker    CHOLECYSTECTOMY        CORONARY ANGIOPLASTY WITH STENT PLACEMENT        EPIDURAL BLOCK INJECTION N/A 5/26/2016     Procedure: BLOCK / INJECTION EPIDURAL STEROID LUMBAR  L4-5;  Surgeon: Denis Mckinnon MD;  Location[facility] OR;  Service:     EYE SURGERY         cataract left    KNEE ARTHROSCOPY W/ MENISCAL REPAIR Left      LUMBAR EPIDURAL INJECTION N/A 3/17/2016     Procedure: BLOCK / INJECTION LUMBAR  L4-5  (C-ARM);  Surgeon: Harleen Camarillo MD;  Location: [facility] MAIN OR;  Service:          No Known Allergies        Current Outpatient Prescriptions:     albuterol (VENTOLIN HFA) 90 mcg/act inhaler, Inhale 2 puffs every 6 (six) hours as needed for wheezing, Disp: 1 Inhaler, Rfl: 6    allopurinol (ZYLOPRIM) 100 mg tablet, Take 1 tablet (100 mg total) by mouth daily, Disp: 90 tablet, Rfl: 3    ascorbic acid (VITAMIN C) 500 mg tablet, Take 500 mg by mouth daily  , Disp: , Rfl:     B Complex Vitamins (B COMPLEX 1 PO), Take 1 tablet by mouth daily  , Disp: , Rfl:     Biotin (BIOTIN 5000) 5 MG CAPS, Take 1,000 mcg by mouth daily  , Disp: , Rfl:     calcium carbonate-vitamin D (OSCAL-D) 500 mg-200 units per tablet, Take 2 tablets by mouth daily at bedtime  , Disp: , Rfl:     ciclopirox (LOPROX) 0 77 % cream, Apply topically 2 (two) times a day for 20 days, Disp: 45 g, Rfl: 2    clopidogrel (PLAVIX) 75 mg tablet, TAKE 1 TABLET DAILY, Disp: 90 tablet, Rfl: 3    collagenase (SANTYL) ointment, Apply 1 application topically daily  , Disp: , Rfl:     Cranberry 1000 MG CAPS, Take 1 tablet by mouth 2 (two) times a day , Disp: , Rfl:     cyanocobalamin 1000 MCG tablet, Take 100 mcg by mouth daily, Disp: , Rfl:     felodipine (PLENDIL) 5 mg 24 hr tablet, TAKE 1 TABLET DAILY, Disp: 90 tablet, Rfl: 3    finasteride (PROSCAR) 5 mg tablet, Take 1 tablet (5 mg total) by mouth daily, Disp: 90 tablet, Rfl: 3    Flaxseed, Linseed, (EQL FLAX SEED OIL) 1000 MG CAPS, Take by mouth daily  , Disp: , Rfl:     fluocinonide (LIDEX) 0 05 % cream, Apply topically 2 (two) times a day for 30 days, Disp: 30 g, Rfl: 2    fluticasone-salmeterol (ADVAIR) 250-50 mcg/dose inhaler, Inhale 1 puff 2 (two) times a day Rinse mouth after use , Disp: 1 Inhaler, Rfl: 6    furosemide (LASIX) 40 mg tablet, TAKE 1 TABLET BY MOUTH DAILY AS NEEDED FOR LEG EDEMA OR DYSNEA, Disp: 30 tablet, Rfl: 0    gabapentin (NEURONTIN) 300 mg capsule, TAKE 1 CAPSULE (300 MG TOTAL) BY MOUTH 2 (TWO) TIMES A DAY FOR 30 DAYS, Disp: 60 capsule, Rfl: 2    gabapentin (NEURONTIN) 300 mg capsule, Take 1 capsule (300 mg total) by mouth 3 (three) times a day for 30 days, Disp: 90 capsule, Rfl: 0    gabapentin (NEURONTIN) 600 MG tablet, Take 1 tablet (600 mg total) by mouth 3 (three) times a day for 30 days, Disp: 60 tablet, Rfl: 0    glucosamine-chondroitin 500-400 MG tablet, Take 1 tablet by mouth 2 (two) times a day , Disp: , Rfl:     hydrOXYzine HCL (ATARAX) 25 mg tablet, Take 1 tablet (25 mg total) by mouth 3 (three) times a day for 3 days, Disp: 9 tablet, Rfl: 0    lactase (LACTAID) 3,000 units tablet, Take 3,000 Units by mouth as needed  , Disp: , Rfl:     methylPREDNISolone 4 MG tablet therapy pack, Use as directed on package, Disp: 21 tablet, Rfl: 0    Omega-3 Fatty Acids (FISH OIL) 1,000 mg, Take 1,000 mg by mouth 2 (two) times a day , Disp: , Rfl:     oxyCODONE-acetaminophen (PERCOCET) 5-325 mg per tablet, 1 tablet to be taken 3 times daily as needed for leg pain, Disp: 30 tablet, Rfl: 0    PARoxetine (PAXIL) 20 mg tablet, TAKE 1/2 TABLET BY MOUTH DAILY, Disp: 30 tablet, Rfl: 0    psyllium (METAMUCIL) 58 6 % powder, Take 1 packet by mouth daily Indications: 1 tsp , Disp: , Rfl:     rivaroxaban (XARELTO) tablet, Take 20 mg by mouth , Disp: , Rfl:     simvastatin (ZOCOR) 20 mg tablet, Take 1 tablet (20 mg total) by mouth daily at bedtime, Disp: 90 tablet, Rfl: 3    traMADol (ULTRAM) 50 mg tablet, 1 tablet to be taken twice daily as needed for foot and leg pain, Disp: 30 tablet, Rfl: 0           Patient Active Problem List   Diagnosis    Chronic pain disorder    Bilateral lumbar radiculopathy    Lumbar canal stenosis    Acquired ankle/foot deformity    Pain in joints of both feet    Dermatophytosis    Atherosclerosis of arteries of extremities (HCC)    Pain in both feet    Onychomycosis    Neck muscle spasm    Anxiety 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    Benign hypertension with CKD (chronic kidney disease) stage III (HCC)    Bilateral leg edema    CKD (chronic kidney disease), stage III (HCC)    Microscopic hematuria    Proteinuria    Urinary frequency    Atrial fibrillation (HCC)    Benign prostatic hyperplasia    Congestive heart failure (HCC)    Arteriosclerosis of coronary artery    Allergic rhinitis    Aneurysm of abdominal aorta (HCC)    Angina pectoris (HCC)    Pain in joint involving ankle and foot    Atrial flutter (HCC)    Carpal tunnel syndrome    Chronic gout without tophus    Chronic low back pain    Chronic obstructive pulmonary disease (HCC)    Colon, diverticulosis    Deep venous insufficiency    Degenerative disc disease, lumbar    Difficulty in walking    Fecal soiling    Fibromyalgia    Fistula-in-ano    Hyperlipidemia    Insomnia, persistent    Memory loss    Moderate or severe vision impairment, one eye    Morbid obesity (HCC)    Nicotine dependence    Osteoarthritis of knee    Pacemaker    Sleep apnea    Urinary incontinence    Venous insufficiency (chronic) (peripheral)    BMI 45 0-49 9, adult (HCC)    Carbuncle of neck    Carbuncle of occipital region of scalp    Elevated glucose    Peripheral arteriosclerosis (HCC)    Radiculopathy of lumbar region    Metatarsalgia of both feet          Physical Exam  Left Foot: Appearance: Normal except as noted: excessive pronation-- and-- pes planus  Second toe deformities include hammer toe  Third toe deformities include hammer toe  Forth toe deformities include hammer toe  Fifth toe deformities include hammer toe  Tenderness: None except the great toe,-- distal first metatarsal,-- distal fifth metatarsal-- and-- insertion of the plantar fascia   Positive Brian sign third left intermetatarsal space  ROM: Full  Motor: Normal   Right Foot: Appearance: Normal except as noted: excessive pronation-- and-- pes planus  Second toe deformities include hammer toe  Third toe deformities include hammer toe  Forth toe deformities include hammer toe  Fifth toe deformities include hammer toe  Evaluation of the great toe nail demonstrates an ingrown nail  Tenderness: None except the insertion of the plantar fascia  Left Ankle: Appearance: Normal except erythema,-- swelling-- and-- swelling laterally  Tenderness: None except the tibiotalar joint  Right Ankle: ROM: Full  Neurological Exam: performed  Deep tendon reflexes: + tinel of right tibial nerve  Vascular Exam: performed Dorsalis pedis pulses were present bilaterally  Posterior tibial pulses were present bilaterally  Elevation Pallor: absent bilaterally  Capillary refill time was less than 1 second bilaterally  Edema: moderate bilaterally-- and-- 4/7 pitting  neg  buddy  Toenails: All of the toenails were elongated,-- hypertrophied,-- discolored,-- ingrown,-- shown to have subungual debris,-- tender-- and-- Mycotic with onychauxis  Hyperkeratosis: present on both first toes,-- present on both first sub metatarsals-- and-- present on both fifth sub metatarsals  Shoe Gear Evaluation: performed ()   Recommendation(s): extra depth diabetic shoes

## 2022-08-08 ENCOUNTER — OFFICE VISIT (OUTPATIENT)
Dept: URGENT CARE | Facility: CLINIC | Age: 82
End: 2022-08-08
Payer: MEDICARE

## 2022-08-08 VITALS
TEMPERATURE: 97 F | OXYGEN SATURATION: 99 % | WEIGHT: 270 LBS | DIASTOLIC BLOOD PRESSURE: 80 MMHG | SYSTOLIC BLOOD PRESSURE: 110 MMHG | RESPIRATION RATE: 18 BRPM | HEIGHT: 73 IN | BODY MASS INDEX: 35.78 KG/M2 | HEART RATE: 70 BPM

## 2022-08-08 DIAGNOSIS — T14.8XXA WOUND OF SKIN: Primary | ICD-10-CM

## 2022-08-08 PROCEDURE — 99203 OFFICE O/P NEW LOW 30 MIN: CPT | Performed by: PHYSICIAN ASSISTANT

## 2022-08-08 NOTE — PROGRESS NOTES
[facility]        NAME: Justino Ayala is a 80 y o  male  : 1940    MRN: [id number]  DATE: 2022  TIME: 4:31 PM    Assessment and Plan   Wound of skin [T14  8XXA]  1  Wound of skin  mupirocin (BACTROBAN) 2 % ointment     Use mupirocin and keep clean and dry  Keep covered  Follow with wound care this week  Patient Instructions       Follow up with PCP in 3-5 days  Proceed to  ER if symptoms worsen  Chief Complaint     Chief Complaint   Patient presents with    skin tear     Had skin tear R shin 9 days ago - hit on running board  Was healing then felt sudden pain 2 nights ago and wound bled - required 2 ACE wraps  Now concerned about infection  History of Present Illness       Pt is an 79 yo male with pmh CAD, HF with ICD, a flutter, PE, peripheral vascular disease, HTN who pw R shin wound x 9 days  Scraped it against his truck 9 days ago and thought it was mostly healed, but last night was laying in bed and developed a sharp pain over wound, then began bleeding "everywhere" through 2 ace wraps  Controlled with pressure and daughters made him come in today  No fevers, body aches, purulent drainage  Does follow with wound care  Review of Systems   Review of Systems   Constitutional: Negative for chills, diaphoresis and fever  HENT: Negative for congestion, rhinorrhea and sore throat  Eyes: Negative for discharge and itching  Respiratory: Negative for cough, chest tightness, shortness of breath and wheezing  Cardiovascular: Negative for chest pain  Gastrointestinal: Negative for diarrhea, nausea and vomiting  Musculoskeletal: Negative for myalgias  Skin: Positive for wound  Negative for rash  Neurological: Negative for weakness and numbness           Current Medications       Current Outpatient Medications:     albuterol (Ventolin HFA) 90 mcg/act inhaler, Inhale 2 puffs every 6 (six) hours as needed for wheezing, Disp: 3 Inhaler, Rfl: 3    Albuterol Sulfate (albuterol, FOR EMS ONLY,) (2 5 mg/3 mL) 0 083 % nebulizer solution, Take 2 5 mg by nebulization every 6 (six) hours as needed for wheezing, Disp: , Rfl:     allopurinol (ZYLOPRIM) 100 mg tablet, Take 2 tablets (200 mg total) by mouth daily, Disp: 180 tablet, Rfl: 3    ascorbic acid (VITAMIN C) 500 mg tablet, Take 500 mg by mouth daily  , Disp: , Rfl:     B Complex Vitamins (B COMPLEX 1 PO), Take 1 tablet by mouth daily  , Disp: , Rfl:     Biotin 5 MG CAPS, Take 1,000 mcg by mouth daily  , Disp: , Rfl:     calcium carbonate-vitamin D (OSCAL-D) 500 mg-200 units per tablet, Take 2 tablets by mouth daily at bedtime  , Disp: , Rfl:     cholecalciferol (VITAMIN D3) 1,000 units tablet, Take 1,000 Units by mouth daily, Disp: , Rfl:     clopidogrel (Plavix) 75 mg tablet, Take 1 tablet (75 mg total) by mouth daily, Disp: 90 tablet, Rfl: 1    Cranberry 1000 MG CAPS, Take 1 tablet by mouth 2 (two) times a day , Disp: , Rfl:     cyanocobalamin 1000 MCG tablet, Take 100 mcg by mouth daily, Disp: , Rfl:     Dapagliflozin Propanediol 5 MG TABS, Take by mouth daily, Disp: , Rfl:     felodipine (PLENDIL) 5 mg 24 hr tablet, Take 1 tablet (5 mg total) by mouth daily, Disp: 90 tablet, Rfl: 1    finasteride (PROSCAR) 5 mg tablet, Take 1 tablet (5 mg total) by mouth daily, Disp: 90 tablet, Rfl: 3    Flaxseed, Linseed, (EQL FLAX SEED OIL) 1000 MG CAPS, Take by mouth daily  , Disp: , Rfl:     fluticasone-umeclidinium-vilanterol (Trelegy Ellipta) 200-62 5-25 MCG/INH AEPB inhaler, Inhale 1 puff daily Rinse mouth after use , Disp: 180 blister, Rfl: 3    furosemide (LASIX) 20 mg tablet, Take 1 tablet (20 mg total) by mouth every evening, Disp: , Rfl: 0    mupirocin (BACTROBAN) 2 % ointment, Apply topically 3 (three) times a day, Disp: 22 g, Rfl: 0    polyethylene glycol (MIRALAX) 17 g packet, Take 17 g by mouth daily as needed, Disp: , Rfl:     rivaroxaban (XARELTO) 15 mg tablet, Take 1 tablet (15 mg total) by mouth daily with breakfast, Disp: , Rfl:     simvastatin (ZOCOR) 20 mg tablet, TAKE 1 TABLET DAILY AT     BEDTIME, Disp: 90 tablet, Rfl: 1    spironolactone (ALDACTONE) 25 mg tablet, Take 25 mg by mouth daily, Disp: , Rfl:     DULoxetine (CYMBALTA) 60 mg delayed release capsule, Take 1 capsule (60 mg total) by mouth 2 (two) times a day, Disp: 60 capsule, Rfl: 1    Current Allergies     Allergies as of 08/08/2022    (No Known Allergies)            The following portions of the patient's history were reviewed and updated as appropriate: allergies, current medications, past family history, past medical history, past social history, past surgical history and problem list      Past Medical History:   Diagnosis Date    Arthritis     [facility]     Chronic kidney disease     stage 3    Coronary artery disease     2 stents    Fluid retention     Gout     Heart disease     [facility] failure ([facility]     pacemaker    Hypertension     Hyponatremia 04/08/2019    Neurological disorder     Pacemaker     Pulmonary emphysema ([facility] )     Radiculopathy     last assessed 1/28/16     Shortness of breath     exertion    Sleep apnea     c pap       Past Surgical History:   Procedure Laterality Date    ANGIOPLASTY      x2 2 stents and then replaced   [address]      pacemaker permanent placement dual chamber / last assessed 4/7/14 / implantation     CARDIAC SURGERY      pacemaker    CHOLECYSTECTOMY      CORONARY ANGIOPLASTY WITH STENT PLACEMENT      EPIDURAL BLOCK INJECTION N/A 05/26/2016    Procedure: BLOCK / INJECTION EPIDURAL STEROID LUMBAR  L4-5;  Surgeon: Ed Kendall MD;  [facility] MAIN OR;  Service:     EPIDURAL BLOCK INJECTION N/A 02/14/2019    Procedure: L4 L5 Lumbar Epidural Steroid Injection;  Surgeon: Ed Kendall MD;  Location: [facility] MAIN OR;  Service: Pain Management     EYE SURGERY      cataract left    HAND SURGERY      HIP PINNING Left     Sinan Funes / Westley Chavez / Ender Gerber PACEMAKER      KNEE ARTHROSCOPY W/ MENISCAL REPAIR Left     KNEE SURGERY      LUMBAR EPIDURAL INJECTION N/A 2016    Procedure: BLOCK / INJECTION LUMBAR  L4-5  (C-ARM); Surgeon: Karolyn Eduardo MD;  Location[facility] MAIN OR;  Service:     ND OPEN RX FEMUR FX+INTRAMED ELLA Right 2022    Procedure: INSERTION NAIL IM FEMUR ANTEGRADE (TROCHANTERIC); Surgeon: Justina Bailey MD;  Location: [facility] Main OR;  Service: Orthopedics       Family History   Problem Relation Age of Onset    Cancer Mother 80        Cancer when 80 yrs old   Galina Ceey Heart disease Mother     Hypertension Mother     Heart disease Father     Cancer Father         Heart   46    Diabetes Neg Hx     Stroke Neg Hx          Medications have been verified  Objective   /80   Pulse 70   Temp (!) 97 °F (36 1 °C)   Resp 18   Ht 6' 1" (1 854 m)   Wt 122 kg (270 lb)   SpO2 99%   BMI 35 62 kg/m²        Physical Exam     Physical Exam  Vitals and nursing note reviewed  Constitutional:       General: He is not in acute distress  Appearance: Normal appearance  He is not ill-appearing  HENT:      Head: Normocephalic and atraumatic  Cardiovascular:      Rate and Rhythm: Normal rate  Pulmonary:      Effort: Pulmonary effort is normal  No respiratory distress  Skin:     General: Skin is warm and dry  Capillary Refill: Capillary refill takes less than 2 seconds  Neurological:      Mental Status: He is alert and oriented to person, place, and time     Psychiatric:         Behavior: Behavior normal

## 2022-09-05 ENCOUNTER — OFFICE VISIT (OUTPATIENT)
Dept: URGENT CARE | Facility: CLINIC | Age: 82
End: 2022-09-05
Payer: MEDICARE

## 2022-09-05 VITALS — HEART RATE: 73 BPM | OXYGEN SATURATION: 97 % | TEMPERATURE: 98.3 F | RESPIRATION RATE: 16 BRPM

## 2022-09-05 DIAGNOSIS — S51.812A SKIN TEAR OF LEFT FOREARM WITHOUT COMPLICATION, INITIAL ENCOUNTER: ICD-10-CM

## 2022-09-05 DIAGNOSIS — S41.112A SKIN TEAR OF LEFT UPPER ARM WITHOUT COMPLICATION, INITIAL ENCOUNTER: Primary | ICD-10-CM

## 2022-09-05 PROCEDURE — 12001 RPR S/N/AX/GEN/TRNK 2.5CM/<: CPT | Performed by: PHYSICIAN ASSISTANT

## 2022-09-05 PROCEDURE — 99214 OFFICE O/P EST MOD 30 MIN: CPT | Performed by: PHYSICIAN ASSISTANT

## 2022-09-05 NOTE — PROGRESS NOTES
[facility] Now        NAME: Julisa Clark is a 80 y o  male  : 1940    MRN: [id number]  DATE: 2022  TIME: 3:18 PM    Assessment and Plan   Skin tear of left upper arm without complication, initial encounter [S41 112A]  1  Skin tear of left upper arm without complication, initial encounter  Laceration repair   2  Skin tear of left forearm without complication, initial encounter  Laceration repair         Patient Instructions   Patient Instructions   Change the wrap in 48 hours         Follow up with PCP in 3-5 days  Proceed to  ER if symptoms worsen  Chief Complaint     Chief Complaint   Patient presents with    Bleeding/Bruising     Pt presents with left elbow injury bleeding for 2 days/ left forearm bruise and left hand         History of Present Illness       The pt is an 42-year-old male presenting for a few skin tears  They have been bleeding for a few days now  Pt is on Plavix  He has two tears to the left upper and lower arm  Review of Systems   Review of Systems   Skin: Positive for wound (2 skin tears to the right lower and upper arm )  Negative for color change  Current Medications       Current Outpatient Medications:     albuterol (Ventolin HFA) 90 mcg/act inhaler, Inhale 2 puffs every 6 (six) hours as needed for wheezing, Disp: 3 Inhaler, Rfl: 3    Albuterol Sulfate (albuterol, FOR EMS ONLY,) (2 5 mg/3 mL) 0 083 % nebulizer solution, Take 2 5 mg by nebulization every 6 (six) hours as needed for wheezing, Disp: , Rfl:     allopurinol (ZYLOPRIM) 100 mg tablet, Take 2 tablets (200 mg total) by mouth daily, Disp: 180 tablet, Rfl: 3    ascorbic acid (VITAMIN C) 500 mg tablet, Take 500 mg by mouth daily  , Disp: , Rfl:     B Complex Vitamins (B COMPLEX 1 PO), Take 1 tablet by mouth daily  , Disp: , Rfl:     Biotin 5 MG CAPS, Take 1,000 mcg by mouth daily  , Disp: , Rfl:     calcium carbonate-vitamin D (OSCAL-D) 500 mg-200 units per tablet, Take 2 tablets by mouth daily at bedtime  , Disp: , Rfl:     cholecalciferol (VITAMIN D3) 1,000 units tablet, Take 1,000 Units by mouth daily, Disp: , Rfl:     clopidogrel (Plavix) 75 mg tablet, Take 1 tablet (75 mg total) by mouth daily, Disp: 90 tablet, Rfl: 1    Cranberry 1000 MG CAPS, Take 1 tablet by mouth 2 (two) times a day , Disp: , Rfl:     cyanocobalamin 1000 MCG tablet, Take 100 mcg by mouth daily, Disp: , Rfl:     Dapagliflozin Propanediol 5 MG TABS, Take by mouth daily, Disp: , Rfl:     felodipine (PLENDIL) 5 mg 24 hr tablet, Take 1 tablet (5 mg total) by mouth daily, Disp: 90 tablet, Rfl: 1    finasteride (PROSCAR) 5 mg tablet, Take 1 tablet (5 mg total) by mouth daily, Disp: 90 tablet, Rfl: 3    Flaxseed, Linseed, (EQL FLAX SEED OIL) 1000 MG CAPS, Take by mouth daily  , Disp: , Rfl:     fluticasone-umeclidinium-vilanterol (Trelegy Ellipta) 200-62 5-25 MCG/INH AEPB inhaler, Inhale 1 puff daily Rinse mouth after use , Disp: 180 blister, Rfl: 3    furosemide (LASIX) 20 mg tablet, Take 1 tablet (20 mg total) by mouth every evening, Disp: , Rfl: 0    mupirocin (BACTROBAN) 2 % ointment, Apply topically 3 (three) times a day, Disp: 22 g, Rfl: 0    polyethylene glycol (MIRALAX) 17 g packet, Take 17 g by mouth daily as needed, Disp: , Rfl:     rivaroxaban (XARELTO) 15 mg tablet, Take 1 tablet (15 mg total) by mouth daily with breakfast, Disp: , Rfl:     simvastatin (ZOCOR) 20 mg tablet, TAKE 1 TABLET DAILY AT     BEDTIME, Disp: 90 tablet, Rfl: 1    spironolactone (ALDACTONE) 25 mg tablet, Take 25 mg by mouth daily, Disp: , Rfl:     DULoxetine (CYMBALTA) 60 mg delayed release capsule, Take 1 capsule (60 mg total) by mouth 2 (two) times a day, Disp: 60 capsule, Rfl: 1    Current Allergies     Allergies as of 09/05/2022    (No Known Allergies)            The following portions of the patient's history were reviewed and updated as appropriate: allergies, current medications, past family history, past medical history, past social history, past surgical history and problem list      Past Medical History:   Diagnosis Date    Arthritis     [facility] flutter [facility] [address] [facility]     Chronic kidney disease     stage 3    Coronary artery disease     2 stents    Fluid retention     Gout     Heart disease     [facility]     pacemaker    Hypertension     Hyponatremia 04/08/2019    Neurological disorder     Pacemaker     [facility]sema ([facility] )     Radiculopathy     last assessed 1/28/16     Shortness of breath     exertion    Sleep apnea     c pap       Past Surgical History:   Procedure Laterality Date    ANGIOPLASTY      x2 2 stents and then replaced    CARDIAC PACEMAKER PLACEMENT      pacemaker permanent placement dual chamber / last assessed 4/7/14 / implantation     CARDIAC SURGERY      pacemaker    CHOLECYSTECTOMY      CORONARY ANGIOPLASTY WITH STENT PLACEMENT      EPIDURAL BLOCK INJECTION N/A 05/26/2016    Procedure: BLOCK / INJECTION EPIDURAL STEROID LUMBAR  L4-5;  Surgeon: Radha Casas MD;  Location[facility] MAIN OR;  Service:     EPIDURAL BLOCK INJECTION N/A 02/14/2019    Procedure: L4 L5 Lumbar Epidural Steroid Injection;  Surgeon: Radha Casas MD;  [facility] MA[facility] OR;  Service: Pain Management     EYE SURGERY      cataract left    HAND SURGERY      HIP PINNING Left     INSERT / REPLACE / [address] ARTHROSCOPY W/ MENISCAL REPAIR Left     KNEE SURGERY      LUMBAR EPIDURAL INJECTION N/A 03/17/2016    Procedure: BLOCK / INJECTION LUMBAR  L4-5  (C-ARM); Surgeon: Radha Casas MD;  Location: [facility] MAIN OR;  Service:     [place] OPEN RX FEMUR FX+INTRAMED ELLA Right 01/31/2022    Procedure: INSERTION NAIL IM FEMUR ANTEGRADE (TROCHANTERIC);   Surgeon: Lesa Baires MD;  [facility] Main OR;  Service: Orthopedics       Family History   Problem Relation Age of Onset    Cancer Mother 80        Cancer when 80 yrs old   [employer] Heart disease Mother     Hypertension Mother     Heart disease Father     Cancer Father         Heart   46    Diabetes Neg Hx     Stroke Neg Hx          Medications have been verified  Objective   Pulse 73   Temp 98 3 °F (36 8 °C)   Resp 16   SpO2 97%          Physical Exam     Physical Exam  Vitals and nursing note reviewed  Constitutional:       General: He is not in acute distress  Appearance: Normal appearance  He is normal weight  He is not ill-appearing, toxic-appearing or diaphoretic  HENT:      Head: Normocephalic and atraumatic  Cardiovascular:      Rate and Rhythm: Normal rate and regular rhythm  Heart sounds: Normal heart sounds  No murmur heard  No friction rub  No gallop  Pulmonary:      Effort: Pulmonary effort is normal  No respiratory distress  Breath sounds: Normal breath sounds  No stridor  No wheezing, rhonchi or rales  Chest:      Chest wall: No tenderness  Musculoskeletal:      Cervical back: Normal range of motion  Lymphadenopathy:      Cervical: No cervical adenopathy  Skin:     General: Skin is warm and dry  Capillary Refill: Capillary refill takes less than 2 seconds  Comments: 2 superficial skin tears to the left arm   One on the lower and one on the upper arm      Neurological:      Mental Status: He is alert  Laceration repair    Date/Time: 2022 3:15 PM  Performed by: Aranza Hu PA-C  Authorized by: Aranza Hu PA-C   Consent: Verbal consent obtained    Risks and benefits: risks, benefits and alternatives were discussed  Consent given by: patient  Patient understanding: patient states understanding of the procedure being performed  Patient consent: the patient's understanding of the procedure matches consent given  Procedure consent: procedure consent matches procedure scheduled  Relevant documents: relevant documents present and verified  Test results: test results available and properly labeled  Required items: required blood products, implants, devices, and special equipment available  Body area: upper extremity  Location details: left upper arm  Foreign bodies: no foreign bodies  Tendon involvement: none  Nerve involvement: none      Procedure Details:  Comments: 2 superficial skin tears   One on the lower left arm and one on the left upper arm   Non stick pad and a pressure dressing applied

## 2022-09-15 ENCOUNTER — TELEPHONE (OUTPATIENT)
Dept: PULMONOLOGY | Facility: CLINIC | Age: 82
End: 2022-09-15

## 2022-09-15 NOTE — TELEPHONE ENCOUNTER
Patient is calling, stating his Cpap machine isn't working for the past week  He cannot get in touch with Doreen properly and keeps getting the round around  He said his machine has had a few replacements and now it is not working, it will not get hot to dispense the water through his machine  He was told by Adin to call [facility]  He is asking for help to get this problem resolved, he said he spoke with a lady at [facility] but still isn't getting anywhere  Please advise    Fernando Ly #[phone] et: 3    When calling the pt he said make sure you follow the prompts    he said I think you need to press 1 after it rings

## 2022-09-15 NOTE — TELEPHONE ENCOUNTER
Called Rob at [facility], he advised unit damaged was the replacement he rec'd from [facility] will not replace  Pt has to call and go through verna himself  I tried as a courtesy to call but after on hold for a while, they stated pt has to call himself  I called pt and advise its not jaz that has to replace but rosina   Advised pt to call and ask to speak to a supervisor

## 2022-09-20 ENCOUNTER — OFFICE VISIT (OUTPATIENT)
Dept: FAMILY MEDICINE CLINIC | Facility: CLINIC | Age: 82
End: 2022-09-20
Payer: MEDICARE

## 2022-09-20 VITALS
RESPIRATION RATE: 16 BRPM | HEART RATE: 77 BPM | BODY MASS INDEX: 35.62 KG/M2 | SYSTOLIC BLOOD PRESSURE: 102 MMHG | HEIGHT: 73 IN | TEMPERATURE: 98 F | OXYGEN SATURATION: 98 % | DIASTOLIC BLOOD PRESSURE: 62 MMHG

## 2022-09-20 DIAGNOSIS — R22.42 LUMP OF SKIN OF LEFT LOWER EXTREMITY: ICD-10-CM

## 2022-09-20 DIAGNOSIS — S16.1XXA STRAIN OF NECK MUSCLE, INITIAL ENCOUNTER: ICD-10-CM

## 2022-09-20 DIAGNOSIS — J44.9 CHRONIC OBSTRUCTIVE PULMONARY DISEASE, UNSPECIFIED COPD TYPE (HCC): ICD-10-CM

## 2022-09-20 DIAGNOSIS — J43.2 CENTRILOBULAR EMPHYSEMA (HCC): Chronic | ICD-10-CM

## 2022-09-20 DIAGNOSIS — M25.552 LEFT HIP PAIN: Primary | ICD-10-CM

## 2022-09-20 DIAGNOSIS — I25.10 CORONARY ARTERY DISEASE INVOLVING NATIVE CORONARY ARTERY OF NATIVE HEART WITHOUT ANGINA PECTORIS: Chronic | ICD-10-CM

## 2022-09-20 PROCEDURE — 99214 OFFICE O/P EST MOD 30 MIN: CPT | Performed by: FAMILY MEDICINE

## 2022-09-20 NOTE — PROGRESS NOTES
Assessment/Plan:    No problem-specific Assessment & Plan notes found for this encounter  Left groin pain, suspect DJD left hip  Check XR  Possible ortho f/u vs PT    Right neck strain, stretch, ice, massage suggested    Left thigh lump, suspect residual from prior hematoma, monitor, US if changes and surgical referral    PRETTY, has copd and CAD and CHF, advised cardio f/u Dr Wesley Miller     Diagnoses and all orders for this visit:    Left hip pain  -     XR hip/pelv 2-3 vws left if performed; Future    Strain of neck muscle, initial encounter    Lump of skin of left lower extremity    Coronary artery disease involving native coronary artery of native heart without angina pectoris    Centrilobular emphysema (HCC)    Chronic obstructive pulmonary disease, unspecified COPD type (HCC)        No follow-ups on file  Subjective:      Patient ID: Summer Mcdonald is a 80 y o  male  Chief Complaint   Patient presents with    Groin Injury     Felipe Edouard MA         HPI    Left groin pain  Pain  Severe  Started last week  No injury  On/off  Worse to walk  Gradual onset  Can't  left leg  Right femur ORIF Jan 2022    No hx of knee/hip replacement  Urine ok  Has constipation  No blood in stool  Pain not constant  Adduction ok and abduction ok but worse with hip flexion    Lump noted thigh  No tender   Since fall  Not enlarging    Neck sore  Right side  About 1w  No injury  w with rotation to right not left  No rash    The following portions of the patient's history were reviewed and updated as appropriate: allergies, current medications, past family history, past medical history, past social history, past surgical history and problem list     Review of Systems   Constitutional: Negative for chills and fever  Respiratory: Positive for shortness of breath  Musculoskeletal: Positive for arthralgias           Current Outpatient Medications   Medication Sig Dispense Refill    Albuterol Sulfate (albuterol, FOR EMS ONLY,) (2 5 mg/3 mL) 0 083 % nebulizer solution Take 2 5 mg by nebulization every 6 (six) hours as needed for wheezing      allopurinol (ZYLOPRIM) 100 mg tablet Take 2 tablets (200 mg total) by mouth daily 180 tablet 3    ascorbic acid (VITAMIN C) 500 mg tablet Take 500 mg by mouth daily   B Complex Vitamins (B COMPLEX 1 PO) Take 1 tablet by mouth daily   Biotin 5 MG CAPS Take 1,000 mcg by mouth daily   calcium carbonate-vitamin D (OSCAL-D) 500 mg-200 units per tablet Take 2 tablets by mouth daily at bedtime   cholecalciferol (VITAMIN D3) 1,000 units tablet Take 1,000 Units by mouth daily      clopidogrel (Plavix) 75 mg tablet Take 1 tablet (75 mg total) by mouth daily 90 tablet 1    Cranberry 1000 MG CAPS Take 1 tablet by mouth 2 (two) times a day   cyanocobalamin 1000 MCG tablet Take 100 mcg by mouth daily      Dapagliflozin Propanediol 5 MG TABS Take by mouth daily      DULoxetine (CYMBALTA) 60 mg delayed release capsule Take 1 capsule (60 mg total) by mouth 2 (two) times a day 60 capsule 1    felodipine (PLENDIL) 5 mg 24 hr tablet Take 1 tablet (5 mg total) by mouth daily 90 tablet 1    finasteride (PROSCAR) 5 mg tablet Take 1 tablet (5 mg total) by mouth daily 90 tablet 3    Flaxseed, Linseed, (EQL FLAX SEED OIL) 1000 MG CAPS Take by mouth daily   fluticasone-umeclidinium-vilanterol (Trelegy Ellipta) 200-62 5-25 MCG/INH AEPB inhaler Inhale 1 puff daily Rinse mouth after use   180 blister 3    furosemide (LASIX) 20 mg tablet Take 1 tablet (20 mg total) by mouth every evening  0    mupirocin (BACTROBAN) 2 % ointment Apply topically 3 (three) times a day 22 g 0    polyethylene glycol (MIRALAX) 17 g packet Take 17 g by mouth daily as needed      rivaroxaban (XARELTO) 15 mg tablet Take 1 tablet (15 mg total) by mouth daily with breakfast      simvastatin (ZOCOR) 20 mg tablet TAKE 1 TABLET DAILY AT     BEDTIME 90 tablet 1    spironolactone (ALDACTONE) 25 mg tablet Take 25 mg by mouth daily       No current facility-administered medications for this visit  Objective:    /62   Pulse 77   Temp 98 °F (36 7 °C)   Resp 16   Ht 6' 1" (1 854 m)   SpO2 98%   BMI 35 62 kg/m²        Physical Exam  Vitals and nursing note reviewed  Constitutional:       Appearance: He is well-developed  He is obese  He is not ill-appearing  HENT:      Head: Normocephalic  Right Ear: Tympanic membrane normal       Left Ear: Tympanic membrane normal    Eyes:      General: No scleral icterus  Conjunctiva/sclera: Conjunctivae normal    Cardiovascular:      Rate and Rhythm: Normal rate  Rhythm irregular  Pulmonary:      Effort: Pulmonary effort is normal  No respiratory distress  Breath sounds: No wheezing or rales  Abdominal:      Palpations: Abdomen is soft  Musculoskeletal:         General: Tenderness present  No deformity  Cervical back: Neck supple  Comments: Left groin pain with hip rom, karen flexion  Right posterior cervical muscle spasm, no mass   Skin:     General: Skin is warm and dry  Coloration: Skin is not pale  Comments: subcutaneous nodule left mid thigh   Neurological:      Mental Status: He is alert  Gait: Gait abnormal    Psychiatric:         Mood and Affect: Mood normal          Behavior: Behavior normal          Thought Content:  Thought content normal                 Luwana Ramp, DO

## 2022-09-21 ENCOUNTER — TRANSCRIBE ORDERS (OUTPATIENT)
Dept: URGENT CARE | Facility: CLINIC | Age: 82
End: 2022-09-21

## 2022-09-21 ENCOUNTER — APPOINTMENT (OUTPATIENT)
Dept: RADIOLOGY | Facility: CLINIC | Age: 82
End: 2022-09-21
Payer: MEDICARE

## 2022-09-21 DIAGNOSIS — M25.552 LEFT HIP PAIN: ICD-10-CM

## 2022-09-21 DIAGNOSIS — R06.02 SHORTNESS OF BREATH: Primary | ICD-10-CM

## 2022-09-21 DIAGNOSIS — R06.02 SHORTNESS OF BREATH: ICD-10-CM

## 2022-09-21 PROCEDURE — 73502 X-RAY EXAM HIP UNI 2-3 VIEWS: CPT

## 2022-09-21 PROCEDURE — 71046 X-RAY EXAM CHEST 2 VIEWS: CPT

## 2022-09-23 DIAGNOSIS — M54.16 RADICULOPATHY OF LUMBAR REGION: ICD-10-CM

## 2022-09-23 DIAGNOSIS — N42.9 PROSTATE DISORDER: ICD-10-CM

## 2022-09-24 RX ORDER — FINASTERIDE 5 MG/1
TABLET, FILM COATED ORAL
Qty: 90 TABLET | Refills: 1 | Status: SHIPPED | OUTPATIENT
Start: 2022-09-24

## 2022-09-26 RX ORDER — DULOXETIN HYDROCHLORIDE 60 MG/1
CAPSULE, DELAYED RELEASE ORAL
Qty: 60 CAPSULE | Refills: 1 | Status: SHIPPED | OUTPATIENT
Start: 2022-09-26

## 2022-09-27 ENCOUNTER — HOSPITAL ENCOUNTER (OUTPATIENT)
Dept: RADIOLOGY | Facility: HOSPITAL | Age: 82
Discharge: HOME | End: 2022-09-27
Attending: ORTHOPAEDIC SURGERY
Payer: MEDICARE

## 2022-09-27 ENCOUNTER — OFFICE VISIT (OUTPATIENT)
Dept: OBGYN CLINIC | Facility: CLINIC | Age: 82
End: 2022-09-27
Payer: MEDICARE

## 2022-09-27 VITALS
SYSTOLIC BLOOD PRESSURE: 132 MMHG | WEIGHT: 270 LBS | HEIGHT: 73 IN | BODY MASS INDEX: 35.78 KG/M2 | HEART RATE: 83 BPM | DIASTOLIC BLOOD PRESSURE: 70 MMHG

## 2022-09-27 DIAGNOSIS — Z98.890 STATUS POST SURGERY: Primary | ICD-10-CM

## 2022-09-27 DIAGNOSIS — Z98.890 STATUS POST SURGERY: ICD-10-CM

## 2022-09-27 PROCEDURE — 99213 OFFICE O/P EST LOW 20 MIN: CPT | Performed by: ORTHOPAEDIC SURGERY

## 2022-09-27 PROCEDURE — 73502 X-RAY EXAM HIP UNI 2-3 VIEWS: CPT

## 2022-09-27 NOTE — PROGRESS NOTES
Assessment:  1  Status post surgery  XR hip/pelv 2-3 vws right if performed       Plan:     Patient is 8 months  s/p right intertochanteric femur fracture fixation on 1/31/22   Weight bearing as tolerated rightt lower extremity    No restrictions    Follow up PRN         The above stated was discussed in layman's terms and the patient expressed understanding  All questions were answered to the patient's satisfaction  Subjective:   April Shirley is a 80 y o  male who presents today 8 months  s/p right intertochanteric femur fracture fixation on 1/31/22  He states he is doing well  He denies any pain or discomfort in his right hip   He is currently not taking any pain medications  He is currently present in a wheelchair  He currently is doing home physical therapy         Review of systems negative unless otherwise specified in HPI    Past Medical History:   Diagnosis Date    Arthritis     [facility] flutter ([facility] [address] [facility]     Chronic kidney disease     stage 3    Coronary artery disease     2 stents    Fluid retention     Gout     Heart disease     Heart failure ([facility] [address] [facility]     pacemaker    Hypertension     Hyponatremia 04/08/2019    Neurological disorder     Pacemaker     Pulmonary emphysema ([facility] [address] [facility]     Radiculopathy     last assessed 1/28/16     Shortness of breath     exertion    Sleep apnea     c pap       Past Surgical History:   Procedure Laterality Date    ANGIOPLASTY      x2 2 stents and then replaced    CARDIAC PACEMAKER PLACEMENT      pacemaker permanent placement dual chamber / last assessed 4/7/14 / implantation     CARDIAC SURGERY      pacemaker    CHOLECYSTECTOMY      CORONARY ANGIOPLASTY WITH STENT PLACEMENT      EPIDURAL BLOCK INJECTION N/A 05/26/2016    Procedure: BLOCK / INJECTION EPIDURAL STEROID LUMBAR  L4-5;  Surgeon: Cesar Mobley MD;  [facility] MA[facility] OR;  Service:     EPIDURAL BLOCK INJECTION N/A 02/14/2019    Procedure: L4 L5 Lumbar Epidural Steroid Injection; Surgeon: Darek Chavarria MD;  Location: [facility] MAIN OR;  Service: Pain Management     EYE SURGERY      cataract left    HAND SURGERY      HIP PINNING Left     INSERT / REPLACE / [address] ARTHROSCOPY W/ MENISCAL REPAIR Left     KNEE SURGERY      LUMBAR EPIDURAL INJECTION N/A 2016    Procedure: BLOCK / INJECTION LUMBAR  L4-5  (C-ARM); Surgeon: Darek Chavarria MD;  Location: [facility] MAIN OR;  Service:     [place] OPEN RX FEMUR FX+INTRAMED ELLA Right 2022    Procedure: INSERTION NAIL IM FEMUR ANTEGRADE (TROCHANTERIC); Surgeon: Vish Guidry MD;  Location: [facility];  Service: Orthopedics       Family History   Problem Relation Age of Onset    Cancer Mother 80        Cancer when 80 yrs old   [employer] Heart disease Mother     Hypertension Mother     Heart disease Father     Cancer Father         Heart   46    Diabetes Neg Hx     Stroke Neg Hx        Social History     Occupational History    Occupation: RETIRED   Tobacco Use    Smoking status: Current Every Day Smoker     Packs/day: 0 25     Years: 50 00     Pack years: 12 50     Types: Cigarettes     Start date: 2022    Smokeless tobacco: Former User    Tobacco comment: quit 2021   Vaping Use    Vaping Use: Former   Substance and Sexual Activity    Alcohol use: Never    Drug use: No    Sexual activity: Yes     Partners: Female     Birth control/protection: None         Current Outpatient Medications:     Albuterol Sulfate (albuterol, FOR EMS ONLY,) (2 5 mg/3 mL) 0 083 % nebulizer solution, Take 2 5 mg by nebulization every 6 (six) hours as needed for wheezing, Disp: , Rfl:     allopurinol (ZYLOPRIM) 100 mg tablet, Take 2 tablets (200 mg total) by mouth daily, Disp: 180 tablet, Rfl: 3    ascorbic acid (VITAMIN C) 500 mg tablet, Take 500 mg by mouth daily  , Disp: , Rfl:     B Complex Vitamins (B COMPLEX 1 PO), Take 1 tablet by mouth daily  , Disp: , Rfl:     Biotin 5 MG CAPS, Take 1,000 mcg by mouth daily  , Disp: , Rfl:     calcium carbonate-vitamin D (OSCAL-D) 500 mg-200 units per tablet, Take 2 tablets by mouth daily at bedtime  , Disp: , Rfl:     cholecalciferol (VITAMIN D3) 1,000 units tablet, Take 1,000 Units by mouth daily, Disp: , Rfl:     clopidogrel (Plavix) 75 mg tablet, Take 1 tablet (75 mg total) by mouth daily, Disp: 90 tablet, Rfl: 1    Cranberry 1000 MG CAPS, Take 1 tablet by mouth 2 (two) times a day , Disp: , Rfl:     cyanocobalamin 1000 MCG tablet, Take 100 mcg by mouth daily, Disp: , Rfl:     Dapagliflozin Propanediol 5 MG TABS, Take by mouth daily, Disp: , Rfl:     DULoxetine (CYMBALTA) 60 mg delayed release capsule, TAKE 1 CAPSULE TWICE DAILY, Disp: 60 capsule, Rfl: 1    felodipine (PLENDIL) 5 mg 24 hr tablet, Take 1 tablet (5 mg total) by mouth daily, Disp: 90 tablet, Rfl: 1    finasteride (PROSCAR) 5 mg tablet, TAKE 1 TABLET DAILY, Disp: 90 tablet, Rfl: 1    Flaxseed, Linseed, (EQL FLAX SEED OIL) 1000 MG CAPS, Take by mouth daily  , Disp: , Rfl:     fluticasone-umeclidinium-vilanterol (Trelegy Ellipta) 200-62 5-25 MCG/INH AEPB inhaler, Inhale 1 puff daily Rinse mouth after use , Disp: 180 blister, Rfl: 3    furosemide (LASIX) 20 mg tablet, Take 1 tablet (20 mg total) by mouth every evening, Disp: , Rfl: 0    mupirocin (BACTROBAN) 2 % ointment, Apply topically 3 (three) times a day, Disp: 22 g, Rfl: 0    polyethylene glycol (MIRALAX) 17 g packet, Take 17 g by mouth daily as needed, Disp: , Rfl:     rivaroxaban (XARELTO) 15 mg tablet, Take 1 tablet (15 mg total) by mouth daily with breakfast, Disp: , Rfl:     simvastatin (ZOCOR) 20 mg tablet, TAKE 1 TABLET DAILY AT     BEDTIME, Disp: 90 tablet, Rfl: 1    spironolactone (ALDACTONE) 25 mg tablet, Take 25 mg by mouth daily, Disp: , Rfl:     No Known Allergies         Vitals:    09/27/22 0855   BP: 132/70   Pulse: 83       Objective:            Physical Exam  Physical Exam:      General Appearance:    Alert, cooperative, no distress, appears stated age   Head:    Normocephalic, without obvious abnormality, atraumatic   Eyes:    conjunctiva/corneas clear, both eyes         Nose:   Nares normal, septum midline, no drainage    Throat:   Lips normal; teeth and gums normal   Neck:    symmetrical, trachea midline, ;     thyroid:  no enlargement/   Back:     Symmetric, no curvature, ROM normal   Lungs:   No audible wheezing or labored breathing   Chest Wall:    No tenderness or deformity    Heart:    Regular rate and rhythm                         Pulses:   2+ and symmetric all extremities   Skin:   Skin color, texture, turgor normal, no rashes or lesions   Neurologic:   normal strength, sensation and reflexes     throughout                       Ortho Exam  Right hip  Surgical incision well healed  No erythema   Able to actively flex hip with no pain  Full ROM of hip   Neurovascularly Intact Distally     Diagnostics, reviewed and taken today if performed as documented: The attending physician has personally reviewed the pertinent films in PACS and interpretation is as follows: x-ray right hip demonstrates s/p fixation, a well healed fracture of the proximal femur      Procedures, if performed today:    Procedures    None performed      Scribe Attestation    I,:  Job Britton am acting as a scribe while in the presence of the attending physician :       I,:  Bob Shabazz MD personally performed the services described in this documentation    as scribed in my presence :             Portions of the record may have been created with voice recognition software  Occasional wrong word or "sound a like" substitutions may have occurred due to the inherent limitations of voice recognition software  Read the chart carefully and recognize, using context, where substitutions have occurred

## 2022-09-28 ENCOUNTER — APPOINTMENT (OUTPATIENT)
Dept: LAB | Facility: HOSPITAL | Age: 82
End: 2022-09-28
Payer: MEDICARE

## 2022-09-28 ENCOUNTER — HOSPITAL ENCOUNTER (OUTPATIENT)
Dept: PULMONOLOGY | Facility: HOSPITAL | Age: 82
Discharge: HOME | End: 2022-09-28
Payer: MEDICARE

## 2022-09-28 ENCOUNTER — TELEPHONE (OUTPATIENT)
Dept: FAMILY MEDICINE CLINIC | Facility: CLINIC | Age: 82
End: 2022-09-28

## 2022-09-28 DIAGNOSIS — R06.02 SHORTNESS OF BREATH: ICD-10-CM

## 2022-09-28 DIAGNOSIS — I10 ESSENTIAL HYPERTENSION, MALIGNANT: ICD-10-CM

## 2022-09-28 DIAGNOSIS — E78.5 HYPERLIPIDEMIA, UNSPECIFIED HYPERLIPIDEMIA TYPE: ICD-10-CM

## 2022-09-28 DIAGNOSIS — B96.20 E COLI BACTEREMIA: Primary | ICD-10-CM

## 2022-09-28 DIAGNOSIS — R78.81 E COLI BACTEREMIA: Primary | ICD-10-CM

## 2022-09-28 DIAGNOSIS — Z79.899 ENCOUNTER FOR LONG-TERM (CURRENT) USE OF OTHER MEDICATIONS: ICD-10-CM

## 2022-09-28 DIAGNOSIS — I25.119 ATHEROSCLEROSIS OF NATIVE CORONARY ARTERY WITH ANGINA PECTORIS, UNSPECIFIED WHETHER NATIVE OR TRANSPLANTED HEART (HCC): ICD-10-CM

## 2022-09-28 LAB
ERYTHROCYTE [DISTWIDTH] IN BLOOD BY AUTOMATED COUNT: 16.5 % (ref 11.6–15.1)
HCT VFR BLD AUTO: 32.1 % (ref 36.5–49.3)
HGB BLD-MCNC: 9.6 G/DL (ref 12–17)
MCH RBC QN AUTO: 27.5 PG (ref 26.8–34.3)
MCHC RBC AUTO-ENTMCNC: 29.9 G/DL (ref 31.4–37.4)
MCV RBC AUTO: 92 FL (ref 82–98)
PLATELET # BLD AUTO: 295 THOUSANDS/UL (ref 149–390)
PMV BLD AUTO: 8.7 FL (ref 8.9–12.7)
RBC # BLD AUTO: 3.49 MILLION/UL (ref 3.88–5.62)
WBC # BLD AUTO: 9.18 THOUSAND/UL (ref 4.31–10.16)

## 2022-09-28 PROCEDURE — 85027 COMPLETE CBC AUTOMATED: CPT

## 2022-09-28 PROCEDURE — 94729 DIFFUSING CAPACITY: CPT

## 2022-09-28 PROCEDURE — 94760 N-INVAS EAR/PLS OXIMETRY 1: CPT

## 2022-09-28 PROCEDURE — 94726 PLETHYSMOGRAPHY LUNG VOLUMES: CPT

## 2022-09-28 PROCEDURE — 94060 EVALUATION OF WHEEZING: CPT

## 2022-09-28 RX ORDER — ALBUTEROL SULFATE 2.5 MG/3ML
2.5 SOLUTION RESPIRATORY (INHALATION) ONCE
Status: COMPLETED | OUTPATIENT
Start: 2022-09-28 | End: 2022-09-28

## 2022-09-28 RX ADMIN — ALBUTEROL SULFATE 2.5 MG: 2.5 SOLUTION RESPIRATORY (INHALATION) at 09:52

## 2022-09-28 NOTE — TELEPHONE ENCOUNTER
Nurse reached out because patient went to do a pulmonary test at [address] and when they tested his hemoglobin t was an 8 8  Dr Jose J Hanks is out of the office  Nurse stated that she did reach out to Dr Jose J Hanks first    Dr Jose J Hanks reached back out to nurse in his office while we were speaking  and he stated that he wants patient to go to ER  I told nurse to follow Dr Nilay Velasquez instruction  Sending this as FYI to Dr Rubi Raman MA

## 2022-09-29 ENCOUNTER — RA CDI HCC (OUTPATIENT)
Dept: OTHER | Facility: HOSPITAL | Age: 82
End: 2022-09-29

## 2022-09-29 NOTE — PROGRESS NOTES
[facility] [address]  coding opportunities       Chart reviewed, no opportunity found: CHART REVIEWED, NO OPPORTUNITY FOUND        Patients Insurance     Medicare Insurance: Medicare

## 2022-10-02 PROBLEM — D62 ACUTE BLOOD LOSS ANEMIA: Status: RESOLVED | Noted: 2022-02-05 | Resolved: 2022-10-02

## 2022-10-02 PROBLEM — M54.16 LUMBAR RADICULOPATHY: Status: RESOLVED | Noted: 2019-01-03 | Resolved: 2022-10-02

## 2022-10-02 RX ORDER — ISOSORBIDE MONONITRATE 30 MG/1
TABLET, EXTENDED RELEASE ORAL
COMMUNITY
Start: 2022-09-23

## 2022-10-05 ENCOUNTER — OFFICE VISIT (OUTPATIENT)
Dept: FAMILY MEDICINE CLINIC | Facility: CLINIC | Age: 82
End: 2022-10-05
Payer: MEDICARE

## 2022-10-05 VITALS
HEIGHT: 73 IN | BODY MASS INDEX: 34.72 KG/M2 | OXYGEN SATURATION: 99 % | SYSTOLIC BLOOD PRESSURE: 114 MMHG | DIASTOLIC BLOOD PRESSURE: 64 MMHG | TEMPERATURE: 97.4 F | WEIGHT: 262 LBS | RESPIRATION RATE: 20 BRPM | HEART RATE: 78 BPM

## 2022-10-05 DIAGNOSIS — I25.10 CORONARY ARTERY DISEASE INVOLVING NATIVE CORONARY ARTERY OF NATIVE HEART WITHOUT ANGINA PECTORIS: Chronic | ICD-10-CM

## 2022-10-05 DIAGNOSIS — N18.30 BENIGN HYPERTENSION WITH CHRONIC KIDNEY DISEASE, STAGE III (HCC): ICD-10-CM

## 2022-10-05 DIAGNOSIS — M1A.9XX0 CHRONIC GOUT WITHOUT TOPHUS, UNSPECIFIED CAUSE, UNSPECIFIED SITE: ICD-10-CM

## 2022-10-05 DIAGNOSIS — I12.9 BENIGN HYPERTENSION WITH CHRONIC KIDNEY DISEASE, STAGE III (HCC): ICD-10-CM

## 2022-10-05 DIAGNOSIS — Z23 NEED FOR VACCINATION: Primary | ICD-10-CM

## 2022-10-05 DIAGNOSIS — N40.0 BENIGN PROSTATIC HYPERPLASIA, UNSPECIFIED WHETHER LOWER URINARY TRACT SYMPTOMS PRESENT: ICD-10-CM

## 2022-10-05 DIAGNOSIS — I10 ESSENTIAL HYPERTENSION: ICD-10-CM

## 2022-10-05 DIAGNOSIS — I48.20 CHRONIC A-FIB (HCC): ICD-10-CM

## 2022-10-05 PROCEDURE — 90662 IIV NO PRSV INCREASED AG IM: CPT

## 2022-10-05 PROCEDURE — 99214 OFFICE O/P EST MOD 30 MIN: CPT | Performed by: FAMILY MEDICINE

## 2022-10-05 PROCEDURE — G0008 ADMIN INFLUENZA VIRUS VAC: HCPCS

## 2022-10-05 RX ORDER — FUROSEMIDE 80 MG
TABLET ORAL
COMMUNITY
Start: 2022-09-29

## 2022-10-05 NOTE — PROGRESS NOTES
Assessment/Plan:    No problem-specific Assessment & Plan notes found for this encounter     htn stable    Recent CHF, f/u cardiology after diuretic increase    Gout stable    Groin pain better    bph stable     Diagnoses and all orders for this visit:    Need for vaccination  -     influenza vaccine, high-dose, PF 0 7 mL (FLUZONE HIGH-DOSE)    Essential hypertension    Chronic gout without tophus, unspecified cause, unspecified site    Chronic a-fib (HCC)    Benign prostatic hyperplasia, unspecified whether lower urinary tract symptoms present    Coronary artery disease involving native coronary artery of native heart without angina pectoris    Benign hypertension with chronic kidney disease, stage III ([facility] [address] )    Other orders  -     isosorbide mononitrate (IMDUR) 30 mg 24 hr tablet  -     furosemide (LASIX) 80 mg tablet              Return in about 6 months (around 4/5/2023) for Recheck  Subjective:      Patient ID: Magdiel Palma is a 80 y o  male  Chief Complaint   Patient presents with    Follow-up     6 month mz cma       HPI  Left hip better  xr left hip neg  Had CXR by Dr Leisa Ashley  Reviewed with pt  Diuretics were adjusted around that time  Has appt next week with cardiology but he is out currently per pt  On less vitamins  No salt  hgb 9 6  Sees nephrology also   Denies any bleeding    The following portions of the patient's history were reviewed and updated as appropriate: allergies, current medications, past family history, past medical history, past social history, past surgical history and problem list     Review of Systems   Constitutional: Negative for chills and fever           Current Outpatient Medications   Medication Sig Dispense Refill    Albuterol Sulfate (albuterol, FOR EMS ONLY,) (2 5 mg/3 mL) 0 083 % nebulizer solution Take 2 5 mg by nebulization every 6 (six) hours as needed for wheezing      allopurinol (ZYLOPRIM) 100 mg tablet Take 2 tablets (200 mg total) by mouth daily 180 tablet 3  clopidogrel (Plavix) 75 mg tablet Take 1 tablet (75 mg total) by mouth daily 90 tablet 1    Cranberry 1000 MG CAPS Take 1 tablet by mouth 2 (two) times a day   Dapagliflozin Propanediol 5 MG TABS Take by mouth daily      DULoxetine (CYMBALTA) 60 mg delayed release capsule TAKE 1 CAPSULE TWICE DAILY 60 capsule 1    finasteride (PROSCAR) 5 mg tablet TAKE 1 TABLET DAILY 90 tablet 1    fluticasone-umeclidinium-vilanterol (Trelegy Ellipta) 200-62 5-25 MCG/INH AEPB inhaler Inhale 1 puff daily Rinse mouth after use  180 blister 3    furosemide (LASIX) 20 mg tablet Take 1 tablet (20 mg total) by mouth every evening  0    isosorbide mononitrate (IMDUR) 30 mg 24 hr tablet       mupirocin (BACTROBAN) 2 % ointment Apply topically 3 (three) times a day 22 g 0    polyethylene glycol (MIRALAX) 17 g packet Take 17 g by mouth daily as needed      rivaroxaban (XARELTO) 15 mg tablet Take 1 tablet (15 mg total) by mouth daily with breakfast      simvastatin (ZOCOR) 20 mg tablet TAKE 1 TABLET DAILY AT     BEDTIME 90 tablet 1    spironolactone (ALDACTONE) 25 mg tablet Take 25 mg by mouth daily      furosemide (LASIX) 80 mg tablet        No current facility-administered medications for this visit  Objective:    /64   Pulse 78   Temp (!) 97 4 °F (36 3 °C)   Resp 20   Ht 6' 1" (1 854 m)   Wt 119 kg (262 lb) Comment: Patient reported - in wheelchair  SpO2 99%   BMI 34 57 kg/m²        Physical Exam  Vitals and nursing note reviewed  Constitutional:       General: He is not in acute distress  Appearance: He is well-developed  He is obese  HENT:      Head: Normocephalic  Right Ear: Tympanic membrane normal       Left Ear: Tympanic membrane normal    Eyes:      General: No scleral icterus  Conjunctiva/sclera: Conjunctivae normal    Cardiovascular:      Rate and Rhythm: Normal rate  Pulmonary:      Effort: Pulmonary effort is normal  No respiratory distress  Breath sounds:  No wheezing  Abdominal:      Palpations: Abdomen is soft  Musculoskeletal:         General: No deformity  Cervical back: Neck supple  Skin:     General: Skin is warm and dry  Coloration: Skin is not pale  Neurological:      Mental Status: He is alert  Gait: Gait abnormal    Psychiatric:         Mood and Affect: Mood normal          Behavior: Behavior normal          Thought Content:  Thought content normal                 [facility]

## 2022-10-12 ENCOUNTER — PROCEDURE VISIT (OUTPATIENT)
Dept: NEUROLOGY | Facility: CLINIC | Age: 82
End: 2022-10-12
Payer: MEDICARE

## 2022-10-12 DIAGNOSIS — R26.81 GAIT INSTABILITY: ICD-10-CM

## 2022-10-12 DIAGNOSIS — R20.0 NUMBNESS AND TINGLING OF FOOT: ICD-10-CM

## 2022-10-12 DIAGNOSIS — R20.2 NUMBNESS AND TINGLING OF FOOT: ICD-10-CM

## 2022-10-12 PROBLEM — Z00.00 MEDICARE ANNUAL WELLNESS VISIT, SUBSEQUENT: Status: RESOLVED | Noted: 2021-02-16 | Resolved: 2022-10-12

## 2022-10-12 PROCEDURE — 95886 MUSC TEST DONE W/N TEST COMP: CPT | Performed by: PHYSICAL MEDICINE & REHABILITATION

## 2022-10-12 PROCEDURE — 95911 NRV CNDJ TEST 9-10 STUDIES: CPT | Performed by: PHYSICAL MEDICINE & REHABILITATION

## 2022-10-12 NOTE — PROGRESS NOTES
EMG 2 limb lower extremity     Date/Time 10/12/2022 2:26 PM     Performed by  Thad Guerra MD     Authorized by Siva Arevalo PA-C                [facility] AT [address]  [phone]    Electromyography & Nerve Conduction Studies Report          Full Name: Talon Lamb Gender: Male  MRN: [id number] YOB: 1940      Visit Date: 10/12/2022 9:01 AM  Age: 80 Years  Examining Physician: Dr Queen Chapin  Referring Physician: Dr Megan Syed History: [de-identified] y old male presents with bilateral foot numbness and longstanding back pain  Lumbar x-rays 1/29/22 revealed multiple degenerated changes  He sustained a fall in 2/22 in which he fractured his femur and underwent surgical intervention  Patient is being evaluated for a lumbosacral radiculopathy  Temperature 32 degrees  Sensory Nerve Conduction Study       Nerve / Sites Rec  Site Onset Lat Peak Lat  Amp Segments Distance Velocity     ms ms µV  cm m/s   L Sural - (Antidromic)  Calf Ankle 2 1 3 1 0 46 Calf - Ankle 14 66   R Sural - (Antidromic)  Calf Ankle NR NR NR Calf - Ankle 14 NR   L Superficial peroneal - (Antidromic)      Lat leg Ankle 2 4 3 4 1 8 Lat leg - Ankle 14 59      Ref  ?4 4 ? 6 0 Ref  ?40   R Superficial peroneal - (Antidromic)      Lat leg Ankle 2 2 2 8 1 6 Lat leg - Ankle 14 63      Ref  ?4 4 ? 6 0 Ref  ?40       Motor Nerve Conduction Study       Nerve / Sites Muscle Latency Ref  Amplitude Ref  Segments Distance Lat Diff Velocity Ref  ms ms mV mV  cm ms m/s m/s   R Peroneal - EDB      Ankle EDB 6 2  0 6  Ankle - EDB 9         B  Fib Head EDB 16 2  0 5  B  Fib Head - Ankle 31 9 98 31       A  Fib Head EDB 18 5  0 5  A  Fib Head - B  Fib Head 10 2 35 42    L Peroneal - EDB      Ankle EDB NR  NR  Ankle - EDB 9         B  Fib Head EDB NR  NR  B  Fib Head - Ankle  NR        A  Fib Head EDB 42 2    A  Fib Head - B   Fib Head  NR     R Tibial - AH      Ankle AH 8 3  1 1  Ankle - AH 9         Knee AH 18 0  2 1  Knee - Ankle 32 9 69 33    L Tibial - AH      Ankle AH 7 1  0 2  Ankle - AH 9         Knee AH 19 8  0 1  Knee - Ankle 32 12 67 25    R Peroneal - Tib Ant      Fib Head Tib Ant 7 3 ?6 7 1 8 ?3 0 Fib Head - Tib Ant          Pop fossa Tib Ant 9 0  1 9  Pop fossa - Fib Head 10 1 67 60 ?44   L Peroneal - Tib Ant      Fib Head Tib Ant NR ?6 7 NR ?3 0 Fib Head - Tib Ant          Pop fossa Tib Ant NR  NR  Pop fossa - Fib Head  NR  ?44       F Waves       Nerve F Latency    ms   R Peroneal - EDB 58 7   L Tibial - AH 52 3   L Peroneal - EDB NR   R Tibial - AH 54 6       H Reflex       Nerve H Latency    ms   L Tibial - Soleus 34 4   R Tibial - Soleus 33 5       EMG Summary Table     Spontaneous MUAP Recruitment   Muscle Nerve Roots IA Fib PSW Fasc H F  Dur  Amp PPP Config Pattern   L  Tibialis anterior Deep peroneal (Fibular) L4-L5 NL None None None None NL NL None NL NL   R  Tibialis anterior Deep peroneal (Fibular) L4-L5 NL None None None None NL Gr  Incr  None NL Reduced   L  Lumbar paraspinals Spinal L1-L5 NL None None None None NL NL None NL NL   R  Lumbar paraspinals Spinal L1-L5 NL None None None None NL NL None NL NL   L  Quadriceps Femoral L2-L4 NL None None None None NL NL None NL NL   R  Quadriceps Femoral L2-L4 NL None None None None NL NL None NL NL   L  Gastrocnemius (Medial head) Tibial S1-S2 NL None None None None NL Sl  Incr  Few NL Reduced   R  Gastrocnemius (Medial head) Tibial S1-S2 NL None None None None NL NL None NL NL   L  Biceps femoris (short head) Sciatic (peroneal division) L5-S2 NL None None None None NL NL None NL NL   R  Biceps femoris (short head) Sciatic (peroneal division) L5-S2 NL None None None None NL NL None NL NL   L  Extensor digitorum brevis Tibial L5-S1 NL None None None None Gr  Incr  NL None NL Reduced   R   Extensor digitorum brevis Tibial L5-S1 NL None None None None NL NL Few NL Reduced Summary        Motor and sensory conduction studies were performed on the bilateral peroneal, tibial and sural nerves  The right peroneal motor terminal latency with recording from the extensor digitorum brevis was normal with a low compound motor action potential amplitude and slow conduction velocity distally and across the fibular head  Left peroneal motor response to the EDB was absent  The right peroneal motor terminal latency with recording from the tibialis anterior was prolonged with low compound motor action potential amplitude and slow conduction velocity across the fibular head  The left peroneal motor response to the tibialis anterior was absent  The bilateral tibial motor latency was prolonged with a low compound motor action potential amplitude and slow conduction velocity across the ankle  That right peroneal and bilateral tibial F waves were normal   The  left peroneal F-wave was absent  The right sural sensory action potential was absent  The left sural  sensory peak latency was normal with a low sensory action potential amplitude and normal conduction velocity across the ankle   The right superficial peroneal sensory peak latency was normal with a low sensory action potential amplitude and normal conduction velocity across the ankle  The left superficial peroneal sensory peak latency was normal the sensory action potential amplitude and normal conduction velocity across the ankle the  The left H soleus was prolonged as compared to the right  Concentric needle EMG was performed in various distal and proximal muscles of the lower extremities bilaterally including EDB, tibialis anterior, gastrocnemius medius, vastus lateralis, short head of biceps and the low lumbar paraspinal regions  There was no evidence of spontaneous activity seen    Moderate decreased recruitment of giant motor units was noted in the bilateral extensor digitorum brevis right tibialis anterior and left medial gastrocnemius The compound motor unit potentials were of normal configuration and interference patterns were full or full for effort in the remaining muscles tested  Impression:        Abnormal study  There is electrophysiologic evidence of a:    1  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

## 2022-10-14 NOTE — PROGRESS NOTES
Patient advised of EMG study and findings. Been diagnosed with peripheral neuropathy. He most likely also has radiculopathy as well. Patient be started gabapentin. He will continue with duloxetine. We recommend he follow up with Neurosurgery.   Most likely need CT myelogram

## 2022-10-17 ENCOUNTER — TELEPHONE (OUTPATIENT)
Dept: NEUROSURGERY | Facility: CLINIC | Age: 82
End: 2022-10-17

## 2022-10-17 NOTE — TELEPHONE ENCOUNTER
Nae Marx to advise of the results of EMG  Explained it reveals peripheral neuropathy and he should follow-up with podiatry and/or PCP for further management  He was appreciative of the update

## 2022-10-17 NOTE — TELEPHONE ENCOUNTER
Received a call from Elli Arana looking for the results of his EMG  Noted this order was placed to rule our spinal radiculopathy and patient should follow-up with PCP or podiatry for review of results  It is also noted that if the result was revealed to be spinal radiculopathy a CT myelogram may be considered  Will discuss with provider and contact the patient to advise

## 2022-10-17 NOTE — TELEPHONE ENCOUNTER
----- Message from Luis Agee PA-C sent at 10/17/2022 12:53 PM EDT -----  I think we can call him with results and defer back to podiatry   Thank you

## 2022-10-20 ENCOUNTER — TELEPHONE (OUTPATIENT)
Dept: FAMILY MEDICINE CLINIC | Facility: CLINIC | Age: 82
End: 2022-10-20

## 2022-10-20 ENCOUNTER — OFFICE VISIT (OUTPATIENT)
Dept: OBGYN CLINIC | Facility: CLINIC | Age: 82
End: 2022-10-20
Payer: MEDICARE

## 2022-10-20 VITALS
HEART RATE: 86 BPM | WEIGHT: 265 LBS | HEIGHT: 73 IN | DIASTOLIC BLOOD PRESSURE: 70 MMHG | BODY MASS INDEX: 35.12 KG/M2 | SYSTOLIC BLOOD PRESSURE: 130 MMHG

## 2022-10-20 DIAGNOSIS — M24.562 FLEXION CONTRACTURE OF LEFT KNEE: ICD-10-CM

## 2022-10-20 DIAGNOSIS — M48.061 SPINAL STENOSIS OF LUMBAR REGION, UNSPECIFIED WHETHER NEUROGENIC CLAUDICATION PRESENT: ICD-10-CM

## 2022-10-20 DIAGNOSIS — M17.0 BILATERAL PRIMARY OSTEOARTHRITIS OF KNEE: Primary | ICD-10-CM

## 2022-10-20 PROCEDURE — 99213 OFFICE O/P EST LOW 20 MIN: CPT | Performed by: ORTHOPAEDIC SURGERY

## 2022-10-20 NOTE — TELEPHONE ENCOUNTER
Pt calling to ask Dr Sushma Preciado to call and go over the ENG results  Pt states they dont understand the letters they received

## 2022-10-20 NOTE — PATIENT INSTRUCTIONS
If [facility] starts having an increase in [facility]

## 2022-11-01 DIAGNOSIS — S72.91XA CLOSED FRACTURE OF RIGHT FEMUR, UNSPECIFIED FRACTURE MORPHOLOGY, INITIAL ENCOUNTER (HCC): ICD-10-CM

## 2022-11-01 NOTE — TELEPHONE ENCOUNTER
rMedication Refills   Person Calling In  Name if not patient Spouse- Sherry   Medication name  Furosemide   Medication Dosage  Medication Frequency 20mg, 2 in the morning, 1 at night    Pharmacy & Location [facility] "[facility]"    Additional Information  90 day supply

## 2022-11-02 RX ORDER — FUROSEMIDE 20 MG/1
TABLET ORAL
Qty: 270 TABLET | Refills: 3 | Status: SHIPPED | OUTPATIENT
Start: 2022-11-02 | End: 2022-11-22

## 2022-11-15 ENCOUNTER — TELEPHONE (OUTPATIENT)
Dept: PULMONOLOGY | Facility: CLINIC | Age: 82
End: 2022-11-15

## 2022-11-15 DIAGNOSIS — J44.1 ACUTE EXACERBATION OF CHRONIC OBSTRUCTIVE PULMONARY DISEASE (COPD) (HCC): Primary | ICD-10-CM

## 2022-11-15 RX ORDER — PREDNISONE 20 MG/1
TABLET ORAL
Qty: 30 TABLET | Refills: 0 | Status: SHIPPED | OUTPATIENT
Start: 2022-11-15 | End: 2022-11-22

## 2022-11-15 NOTE — TELEPHONE ENCOUNTER
Patients wife called stating Christi Ibarra is having difficulty breathing and asking for assistance   Please advise

## 2022-11-15 NOTE — TELEPHONE ENCOUNTER
Isiah Tyler would like a return call regarding     What is the reason for the call/chief complaint? More sob than usual, pt walks from bathroom to room less than 30 ft and is really out of breath    What additional symptoms are present or absent? SOB, yes   Are they on O2? No Pulse O2 restingna When walkingna   chest pain/tightness, no  wheezing, yes  Cough, yes  mucous production,yes  What color is it does not produce phlegm all the time but when he does its white  fevers/chills, no  weight gain, no  Swelling no  Pain no, does it hurt when breathing or all the time? na    When did they start/how long have they been going on? Always has had bur more last Few days     Constant or intermittent; if intermittent describe yes? What makes it better or worse?na    Have you been exposed to anyone that is sick? No    Have you been tested for COVID recently? no    Check current pulmonary medications trelegy and duo neb   frequency of use daily     Have they had any other treatment or testing for this problem elsewhere? no    Recent steroids? No    Recent Antibiotics? No     Last office visit? 6/22/22    Patient pharmacy?  [facility]    30 or 90 day supply 30

## 2022-11-17 ENCOUNTER — APPOINTMENT (EMERGENCY)
Dept: RADIOLOGY | Facility: HOSPITAL | Age: 82
End: 2022-11-17

## 2022-11-17 ENCOUNTER — HOSPITAL ENCOUNTER (INPATIENT)
Facility: HOSPITAL | Age: 82
LOS: 5 days | Discharge: HOME | End: 2022-11-22
Attending: GENERAL PRACTICE | Admitting: FAMILY MEDICINE

## 2022-11-17 DIAGNOSIS — K92.2 GI BLEED: ICD-10-CM

## 2022-11-17 DIAGNOSIS — M17.0 PRIMARY OSTEOARTHRITIS OF BOTH KNEES: ICD-10-CM

## 2022-11-17 DIAGNOSIS — N17.9 AKI (ACUTE KIDNEY INJURY) (HCC): ICD-10-CM

## 2022-11-17 DIAGNOSIS — R77.8 ELEVATED TROPONIN: Primary | ICD-10-CM

## 2022-11-17 DIAGNOSIS — R13.14 DYSPHAGIA, PHARYNGOESOPHAGEAL PHASE: ICD-10-CM

## 2022-11-17 DIAGNOSIS — Z72.0 NICOTINE ABUSE: ICD-10-CM

## 2022-11-17 DIAGNOSIS — K21.9 GASTRO-ESOPHAGEAL REFLUX DISEASE WITHOUT ESOPHAGITIS: ICD-10-CM

## 2022-11-17 DIAGNOSIS — I50.33 ACUTE ON CHRONIC DIASTOLIC HEART FAILURE (HCC): ICD-10-CM

## 2022-11-17 DIAGNOSIS — D50.9 IRON DEFICIENCY ANEMIA, UNSPECIFIED IRON DEFICIENCY ANEMIA TYPE: ICD-10-CM

## 2022-11-17 DIAGNOSIS — B37.81 ESOPHAGEAL CANDIDIASIS (HCC): ICD-10-CM

## 2022-11-17 LAB
2HR DELTA HS TROPONIN: 4 NG/L
4HR DELTA HS TROPONIN: -45 NG/L
ABO GROUP BLD: NORMAL
ALBUMIN SERPL BCP-MCNC: 3 G/DL (ref 3.5–5)
ALP SERPL-CCNC: 81 U/L (ref 46–116)
ALT SERPL W P-5'-P-CCNC: 25 U/L (ref 12–78)
ANION GAP SERPL CALCULATED.3IONS-SCNC: 5 MMOL/L (ref 4–13)
APTT PPP: 33 SECONDS (ref 23–37)
ATRIAL RATE: 72 BPM
BASOPHILS # BLD AUTO: 0.01 THOUSANDS/ÂΜL (ref 0–0.1)
BASOPHILS NFR BLD AUTO: 0 % (ref 0–1)
BILIRUB DIRECT SERPL-MCNC: 0.33 MG/DL (ref 0–0.2)
BILIRUB SERPL-MCNC: 1.07 MG/DL (ref 0.2–1)
BLD GP AB SCN SERPL QL: NEGATIVE
BLD SMEAR INTERP: NORMAL
BUN SERPL-MCNC: 48 MG/DL (ref 5–25)
CALCIUM ALBUM COR SERPL-MCNC: 9.9 MG/DL (ref 8.3–10.1)
CALCIUM SERPL-MCNC: 9.1 MG/DL (ref 8.3–10.1)
CARDIAC TROPONIN I PNL SERPL HS: 181 NG/L
CARDIAC TROPONIN I PNL SERPL HS: 226 NG/L
CARDIAC TROPONIN I PNL SERPL HS: 230 NG/L
CHLORIDE SERPL-SCNC: 98 MMOL/L (ref 96–108)
CO2 SERPL-SCNC: 28 MMOL/L (ref 21–32)
CREAT SERPL-MCNC: 2 MG/DL (ref 0.6–1.3)
EOSINOPHIL # BLD AUTO: 0 THOUSAND/ÂΜL (ref 0–0.61)
EOSINOPHIL NFR BLD AUTO: 0 % (ref 0–6)
ERYTHROCYTE [DISTWIDTH] IN BLOOD BY AUTOMATED COUNT: 19.4 % (ref 11.6–15.1)
EXT FECAL OCCULT BLOOD SCREEN: POSITIVE
EXT. CONTROL: ABNORMAL
GFR SERPL CREATININE-BSD FRML MDRD: 30 ML/MIN/1.73SQ M
GLUCOSE SERPL-MCNC: 128 MG/DL (ref 65–140)
HCT VFR BLD AUTO: 23.8 % (ref 36.5–49.3)
HGB BLD-MCNC: 7.1 G/DL (ref 12–17)
IMM GRANULOCYTES # BLD AUTO: 0.1 THOUSAND/UL (ref 0–0.2)
IMM GRANULOCYTES NFR BLD AUTO: 1 % (ref 0–2)
INR PPP: 1.93 (ref 0.84–1.19)
LDH SERPL-CCNC: 256 U/L (ref 81–234)
LYMPHOCYTES # BLD AUTO: 0.55 THOUSANDS/ÂΜL (ref 0.6–4.47)
LYMPHOCYTES NFR BLD AUTO: 5 % (ref 14–44)
MCH RBC QN AUTO: 25.5 PG (ref 26.8–34.3)
MCHC RBC AUTO-ENTMCNC: 29.8 G/DL (ref 31.4–37.4)
MCV RBC AUTO: 86 FL (ref 82–98)
MONOCYTES # BLD AUTO: 0.66 THOUSAND/ÂΜL (ref 0.17–1.22)
MONOCYTES NFR BLD AUTO: 6 % (ref 4–12)
NEUTROPHILS # BLD AUTO: 10.64 THOUSANDS/ÂΜL (ref 1.85–7.62)
NEUTS SEG NFR BLD AUTO: 88 % (ref 43–75)
NRBC BLD AUTO-RTO: 0 /100 WBCS
NT-PROBNP SERPL-MCNC: 7666 PG/ML
PLATELET # BLD AUTO: 236 THOUSANDS/UL (ref 149–390)
PMV BLD AUTO: 8.9 FL (ref 8.9–12.7)
POTASSIUM SERPL-SCNC: 4.4 MMOL/L (ref 3.5–5.3)
PROT SERPL-MCNC: 7.9 G/DL (ref 6.4–8.4)
PROTHROMBIN TIME: 22.2 SECONDS (ref 11.6–14.5)
QRS AXIS: 47 DEGREES
QRSD INTERVAL: 92 MS
QT INTERVAL: 418 MS
QTC INTERVAL: 451 MS
RBC # BLD AUTO: 2.78 MILLION/UL (ref 3.88–5.62)
RETICS # AUTO: ABNORMAL 10*3/UL (ref 14356–105094)
RETICS # CALC: 4.89 % (ref 0.37–1.87)
RH BLD: POSITIVE
SODIUM SERPL-SCNC: 131 MMOL/L (ref 135–147)
SPECIMEN EXPIRATION DATE: NORMAL
T WAVE AXIS: 269 DEGREES
VENTRICULAR RATE: 70 BPM
WBC # BLD AUTO: 11.96 THOUSAND/UL (ref 4.31–10.16)

## 2022-11-17 PROCEDURE — 30233N1 TRANSFUSION OF NONAUTOLOGOUS RED BLOOD CELLS INTO PERIPHERAL VEIN, PERCUTANEOUS APPROACH: ICD-10-PCS | Performed by: INTERNAL MEDICINE

## 2022-11-17 RX ORDER — DULOXETIN HYDROCHLORIDE 60 MG/1
60 CAPSULE, DELAYED RELEASE ORAL 2 TIMES DAILY
Status: DISCONTINUED | OUTPATIENT
Start: 2022-11-17 | End: 2022-11-22 | Stop reason: HOSPADM

## 2022-11-17 RX ORDER — ACETAMINOPHEN 325 MG/1
650 TABLET ORAL ONCE
Status: COMPLETED | OUTPATIENT
Start: 2022-11-17 | End: 2022-11-17

## 2022-11-17 RX ORDER — IPRATROPIUM BROMIDE AND ALBUTEROL SULFATE 2.5; .5 MG/3ML; MG/3ML
3 SOLUTION RESPIRATORY (INHALATION)
Status: DISCONTINUED | OUTPATIENT
Start: 2022-11-17 | End: 2022-11-22 | Stop reason: HOSPADM

## 2022-11-17 RX ORDER — FINASTERIDE 5 MG/1
5 TABLET, FILM COATED ORAL DAILY
Status: DISCONTINUED | OUTPATIENT
Start: 2022-11-18 | End: 2022-11-22 | Stop reason: HOSPADM

## 2022-11-17 RX ORDER — DIPHENHYDRAMINE HCL 25 MG
25 TABLET ORAL ONCE
Status: COMPLETED | OUTPATIENT
Start: 2022-11-17 | End: 2022-11-17

## 2022-11-17 RX ORDER — FLUTICASONE FUROATE AND VILANTEROL 100; 25 UG/1; UG/1
1 POWDER RESPIRATORY (INHALATION) DAILY
Status: DISCONTINUED | OUTPATIENT
Start: 2022-11-18 | End: 2022-11-22 | Stop reason: HOSPADM

## 2022-11-17 RX ORDER — IPRATROPIUM BROMIDE AND ALBUTEROL SULFATE 2.5; .5 MG/3ML; MG/3ML
3 SOLUTION RESPIRATORY (INHALATION)
Status: DISCONTINUED | OUTPATIENT
Start: 2022-11-17 | End: 2022-11-17

## 2022-11-17 RX ORDER — POLYETHYLENE GLYCOL 3350 17 G/17G
17 POWDER, FOR SOLUTION ORAL DAILY
Status: DISCONTINUED | OUTPATIENT
Start: 2022-11-18 | End: 2022-11-22 | Stop reason: HOSPADM

## 2022-11-17 RX ORDER — PRAVASTATIN SODIUM 40 MG
40 TABLET ORAL
Status: DISCONTINUED | OUTPATIENT
Start: 2022-11-17 | End: 2022-11-22 | Stop reason: HOSPADM

## 2022-11-17 RX ORDER — GABAPENTIN 100 MG/1
100 CAPSULE ORAL
Status: DISCONTINUED | OUTPATIENT
Start: 2022-11-17 | End: 2022-11-22 | Stop reason: HOSPADM

## 2022-11-17 RX ORDER — FUROSEMIDE 10 MG/ML
40 INJECTION INTRAMUSCULAR; INTRAVENOUS ONCE
Status: DISCONTINUED | OUTPATIENT
Start: 2022-11-17 | End: 2022-11-17

## 2022-11-17 RX ORDER — PANTOPRAZOLE SODIUM 40 MG/10ML
40 INJECTION, POWDER, LYOPHILIZED, FOR SOLUTION INTRAVENOUS EVERY 12 HOURS SCHEDULED
Status: DISCONTINUED | OUTPATIENT
Start: 2022-11-17 | End: 2022-11-20

## 2022-11-17 RX ORDER — FUROSEMIDE 10 MG/ML
60 INJECTION INTRAMUSCULAR; INTRAVENOUS ONCE
Status: COMPLETED | OUTPATIENT
Start: 2022-11-17 | End: 2022-11-18

## 2022-11-17 RX ORDER — IPRATROPIUM BROMIDE AND ALBUTEROL SULFATE 2.5; .5 MG/3ML; MG/3ML
3 SOLUTION RESPIRATORY (INHALATION) ONCE
Status: COMPLETED | OUTPATIENT
Start: 2022-11-17 | End: 2022-11-17

## 2022-11-17 RX ORDER — FUROSEMIDE 10 MG/ML
40 INJECTION INTRAMUSCULAR; INTRAVENOUS
Status: DISCONTINUED | OUTPATIENT
Start: 2022-11-18 | End: 2022-11-19

## 2022-11-17 RX ORDER — ISOSORBIDE MONONITRATE 30 MG/1
30 TABLET, EXTENDED RELEASE ORAL DAILY
Status: DISCONTINUED | OUTPATIENT
Start: 2022-11-18 | End: 2022-11-22 | Stop reason: HOSPADM

## 2022-11-17 RX ORDER — ALLOPURINOL 100 MG/1
200 TABLET ORAL DAILY
Status: DISCONTINUED | OUTPATIENT
Start: 2022-11-18 | End: 2022-11-22 | Stop reason: HOSPADM

## 2022-11-17 RX ADMIN — PRAVASTATIN SODIUM 40 MG: 40 TABLET ORAL at 19:06

## 2022-11-17 RX ADMIN — GABAPENTIN 100 MG: 100 CAPSULE ORAL at 23:26

## 2022-11-17 RX ADMIN — IPRATROPIUM BROMIDE AND ALBUTEROL SULFATE 3 ML: 2.5; .5 SOLUTION RESPIRATORY (INHALATION) at 20:33

## 2022-11-17 RX ADMIN — DIPHENHYDRAMINE HYDROCHLORIDE 25 MG: 25 TABLET ORAL at 19:44

## 2022-11-17 RX ADMIN — ACETAMINOPHEN 650 MG: 325 TABLET, FILM COATED ORAL at 19:43

## 2022-11-17 RX ADMIN — SODIUM CHLORIDE 8 MG/HR: 900 INJECTION, SOLUTION INTRAVENOUS at 17:09

## 2022-11-17 RX ADMIN — IPRATROPIUM BROMIDE AND ALBUTEROL SULFATE 3 ML: 2.5; .5 SOLUTION RESPIRATORY (INHALATION) at 14:05

## 2022-11-17 RX ADMIN — PANTOPRAZOLE SODIUM 40 MG: 40 INJECTION, POWDER, FOR SOLUTION INTRAVENOUS at 23:27

## 2022-11-17 RX ADMIN — DULOXETINE HYDROCHLORIDE 60 MG: 60 CAPSULE, DELAYED RELEASE ORAL at 19:06

## 2022-11-17 NOTE — ED PROVIDER NOTES
History  Chief Complaint   Patient presents with   • Abnormal Lab     Wife states cardiologist Dr Indiana Harding called that patient lab values done yesterday were abnormal  Wife states patient keeps on falling and is bruised all over  Patient denies hitting head, denies chest/abdominal pain  Patient states severe PRETTY which started 1 week ago      Patient is an 27-year-old male who presents to the emergency room with after his cardiologist told him to come to the ER for “abnormal labs”  Patient unsure what abnormalities were noted, but states he had blood work done yesterday  On further questioning patient does endorse 1 week of dark stools  He denies dizziness, nausea, vomiting, decreased appetite, or chest pain  He also endorses worsening shortness of breath over the last week  He does have a history of smoking and has COPD  He also endorses a fall about 3 weeks ago but denies any residual pain or trauma  Prior to Admission Medications   Prescriptions Last Dose Informant Patient Reported? Taking? Albuterol Sulfate (albuterol, FOR EMS ONLY,) (2 5 mg/3 mL) 0 083 % nebulizer solution 11/17/2022 at 0800  Yes Yes   Sig: Take 2 5 mg by nebulization every 6 (six) hours as needed for wheezing   Cranberry 1000 MG CAPS   Yes No   Sig: Take 1 tablet by mouth 2 (two) times a day  DULoxetine (CYMBALTA) 60 mg delayed release capsule   No No   Sig: TAKE 1 CAPSULE TWICE DAILY   Dapagliflozin Propanediol 5 MG TABS   Yes No   Sig: Take by mouth daily   allopurinol (ZYLOPRIM) 100 mg tablet 11/17/2022  No Yes   Sig: Take 2 tablets (200 mg total) by mouth daily   clopidogrel (Plavix) 75 mg tablet 11/16/2022  No Yes   Sig: Take 1 tablet (75 mg total) by mouth daily   finasteride (PROSCAR) 5 mg tablet   No No   Sig: TAKE 1 TABLET DAILY   fluticasone-umeclidinium-vilanterol (Trelegy Ellipta) 200-62 5-25 MCG/INH AEPB inhaler   No No   Sig: Inhale 1 puff daily Rinse mouth after use     furosemide (LASIX) 20 mg tablet 11/17/2022 No Yes   Sig: Take two tablet total 40 mg in a m  and one tablet 20 mg in p m    furosemide (LASIX) 80 mg tablet 11/17/2022  Yes Yes   gabapentin (NEURONTIN) 100 mg capsule   No No   Sig: Take 1 capsule (100 mg total) by mouth daily at bedtime   isosorbide mononitrate (IMDUR) 30 mg 24 hr tablet   Yes No   mupirocin (BACTROBAN) 2 % ointment   No No   Sig: Apply topically 3 (three) times a day   polyethylene glycol (MIRALAX) 17 g packet   Yes No   Sig: Take 17 g by mouth daily as needed   predniSONE 20 mg tablet   No No   Sig: Take 3 tablets daily for 4 days then 2 tablets for 4 days then 1 tablet for 4 days then 1/2 tablet for 4 days and stop   rivaroxaban (XARELTO) 15 mg tablet 11/16/2022  No Yes   Sig: Take 1 tablet (15 mg total) by mouth daily with breakfast   simvastatin (ZOCOR) 20 mg tablet   No No   Sig: TAKE 1 TABLET DAILY AT     BEDTIME   spironolactone (ALDACTONE) 25 mg tablet   Yes No   Sig: Take 25 mg by mouth daily      Facility-Administered Medications: None       Past Medical History:   Diagnosis Date   • Arthritis    • Atrial flutter (HCC)    • Chronic kidney disease     stage 3   • Coronary artery disease     2 stents   • Fluid retention    • Gout    • Heart disease    • Heart failure (HCC)     pacemaker   • Hypertension    • Hyponatremia 04/08/2019   • Neurological disorder    • Pacemaker    • Pulmonary emphysema ([facility] [address] )    • Radiculopathy     last assessed 1/28/16    • Shortness of breath     exertion   • Sleep apnea     c pap       Past Surgical History:   Procedure Laterality Date   • ANGIOPLASTY      x2 2 stents and then replaced   • CARDIAC PACEMAKER PLACEMENT      pacemaker permanent placement dual chamber / last assessed 4/7/14 / implantation    • CARDIAC SURGERY      pacemaker   • CHOLECYSTECTOMY     • CORONARY ANGIOPLASTY WITH STENT PLACEMENT     • EPIDURAL BLOCK INJECTION N/A 05/26/2016    Procedure: BLOCK / INJECTION EPIDURAL STEROID LUMBAR  L4-5;  Surgeon: Yu Chang MD;  Location: [facility] MAIN OR;  Service:    • EPIDURAL BLOCK INJECTION N/A 2019    Procedure: L4 L5 Lumbar Epidural Steroid Injection;  Surgeon: Yu Chang MD;  Location: [facility] MAIN OR;  Service: Pain Management    • EYE SURGERY      cataract left   • HAND SURGERY     • HIP PINNING Left    • INSERT / REPLACE / REMOVE PACEMAKER     • KNEE ARTHROSCOPY W/ MENISCAL REPAIR Left    • KNEE SURGERY     • LUMBAR EPIDURAL INJECTION N/A 2016    Procedure: BLOCK / INJECTION LUMBAR  L4-5  (C-ARM); Surgeon: Yu Chang MD;  Location: [facility] MAIN OR;  Service:    • NH OPEN RX FEMUR FX+INTRAMED ELLA Right 2022    Procedure: INSERTION NAIL IM FEMUR ANTEGRADE (TROCHANTERIC); Surgeon: Nicolás Ayala MD;  Location: [facility] Main OR;  Service: Orthopedics       Family History   Problem Relation Age of Onset   • Cancer Mother 80        Cancer when 80 yrs old   • Heart disease Mother    • Hypertension Mother    • Heart disease Father    • Cancer Father         Heart   46   • Diabetes Neg Hx    • Stroke Neg Hx      I have reviewed and agree with the history as documented  E-Cigarette/Vaping   • E-Cigarette Use Never User      E-Cigarette/Vaping Substances   • Nicotine No    • THC No    • CBD No    • Flavoring No    • Other No    • Unknown No      Social History     Tobacco Use   • Smoking status: Every Day     Packs/day: 0 25     Years: 50 00     Pack years: 12 50     Types: Cigarettes     Start date: 2022   • Smokeless tobacco: Former   • Tobacco comments:     quit 2021   Vaping Use   • Vaping Use: Never used   Substance Use Topics   • Alcohol use: Never   • Drug use: No       Review of Systems   Constitutional: Negative for chills and fatigue  HENT: Negative for congestion and rhinorrhea  Eyes: Negative for redness and visual disturbance  Respiratory: Positive for shortness of breath  Negative for cough and wheezing  Cardiovascular: Negative for chest pain and palpitations     Gastrointestinal: Positive for blood in stool  Negative for abdominal pain, constipation, diarrhea, nausea and vomiting  Endocrine: Negative for polydipsia and polyuria  Genitourinary: Negative for dysuria and hematuria  Musculoskeletal: Negative for arthralgias and myalgias  Neurological: Negative for light-headedness and headaches  Hematological: Negative for adenopathy  Does not bruise/bleed easily  Psychiatric/Behavioral: Negative for dysphoric mood  The patient is not nervous/anxious  All other systems reviewed and are negative  Physical Exam  Physical Exam  Constitutional:       General: He is not in acute distress  Appearance: Normal appearance  He is obese  He is not ill-appearing  HENT:      Head: Normocephalic and atraumatic  Mouth/Throat:      Mouth: Mucous membranes are moist       Pharynx: Oropharynx is clear  Eyes:      General: No scleral icterus  Conjunctiva/sclera: Conjunctivae normal    Cardiovascular:      Rate and Rhythm: Normal rate and regular rhythm  Pulses: Normal pulses  Heart sounds: No murmur heard  No friction rub  No gallop  Pulmonary:      Effort: Respiratory distress (Mild, speaking in 5-6 word sentences) present  Breath sounds: Rhonchi present  No wheezing or rales  Comments: Rhonchorous breath sounds throughout, no wheezes or crackles  Abdominal:      Palpations: Abdomen is soft  Tenderness: There is no abdominal tenderness  Musculoskeletal:         General: No swelling or tenderness  Normal range of motion  Cervical back: Normal range of motion and neck supple  Skin:     General: Skin is warm and dry  Capillary Refill: Capillary refill takes less than 2 seconds  Neurological:      General: No focal deficit present  Mental Status: He is alert and oriented to person, place, and time  Mental status is at baseline     Psychiatric:         Mood and Affect: Mood normal          Behavior: Behavior normal          Vital Signs  ED Triage Vitals   Temperature Pulse Respirations Blood Pressure SpO2   11/17/22 1201 11/17/22 1201 11/17/22 1201 11/17/22 1201 11/17/22 1201   97 5 °F (36 4 °C) 75 20 130/59 98 %      Temp Source Heart Rate Source Patient Position - Orthostatic VS BP Location FiO2 (%)   11/17/22 1201 11/17/22 1201 11/17/22 1201 11/17/22 1201 --   Tympanic Monitor Sitting Left arm       Pain Score       11/17/22 1636       No Pain           Vitals:    11/17/22 1330 11/17/22 1500 11/17/22 1515 11/17/22 1702   BP:    130/59   Pulse: 70 70 72 70   Patient Position - Orthostatic VS:             Visual Acuity      ED Medications  Medications   pantoprazole (PROTONIX) injection 40 mg (has no administration in time range)   acetaminophen (TYLENOL) tablet 650 mg (has no administration in time range)   allopurinol (ZYLOPRIM) tablet 200 mg (has no administration in time range)   Fluticasone Furoate-Vilanterol 100-25 mcg/actuation 1 puff (has no administration in time range)   gabapentin (NEURONTIN) capsule 100 mg (has no administration in time range)   DULoxetine (CYMBALTA) delayed release capsule 60 mg (has no administration in time range)   finasteride (PROSCAR) tablet 5 mg (has no administration in time range)   pravastatin (PRAVACHOL) tablet 40 mg (has no administration in time range)   isosorbide mononitrate (IMDUR) 24 hr tablet 30 mg (has no administration in time range)   nicotine (NICODERM CQ) 7 mg/24hr TD 24 hr patch 1 patch (has no administration in time range)   furosemide (LASIX) injection 40 mg (has no administration in time range)   furosemide (LASIX) injection 60 mg (has no administration in time range)   diphenhydrAMINE (BENADRYL) tablet 25 mg (has no administration in time range)   ipratropium-albuterol (DUO-NEB) 0 5-2 5 mg/3 mL inhalation solution 3 mL (3 mL Nebulization Given 11/17/22 1405)       Diagnostic Studies  Results Reviewed     Procedure Component Value Units Date/Time    NT-BNP PRO [[id number]]  (Abnormal) Collected: 11/17/22 1327    Lab Status: Final result Specimen: Blood from Arm, Right Updated: 11/17/22 1649     NT-proBNP 7,666 pg/mL     HS Troponin I 2hr [[id number]]  (Abnormal) Collected: 11/17/22 1519    Lab Status: Final result Specimen: Blood from Arm, Right Updated: 11/17/22 1550     hs TnI 2hr 230 ng/L      Delta 2hr hsTnI 4 ng/L     HS Troponin I 4hr [[id number]]     Lab Status: No result Specimen: Blood     Comprehensive metabolic panel [[id number]]  (Abnormal) Collected: 11/17/22 1327    Lab Status: Final result Specimen: Blood from Arm, Right Updated: 11/17/22 1401     Sodium 131 mmol/L      Potassium 4 4 mmol/L      Chloride 98 mmol/L      CO2 28 mmol/L      ANION GAP 5 mmol/L      BUN 48 mg/dL      Creatinine 2 00 mg/dL      Glucose 128 mg/dL      Calcium 9 1 mg/dL      Corrected Calcium 9 9 mg/dL      AST --     ALT 25 U/L      Alkaline Phosphatase 81 U/L      Total Protein 7 9 g/dL      Albumin 3 0 g/dL      Total Bilirubin 1 07 mg/dL      eGFR 30 ml/min/1 73sq m     Narrative:      [facility] guidelines for Chronic Kidney Disease (CKD):   •  Stage 1 with normal or high GFR (GFR > 90 mL/min/1 73 square meters)  •  Stage 2 Mild CKD (GFR = 60-89 mL/min/1 73 square meters)  •  Stage 3A Moderate CKD (GFR = 45-59 mL/min/1 73 square meters)  •  Stage 3B Moderate CKD (GFR = 30-44 mL/min/1 73 square meters)  •  Stage 4 Severe CKD (GFR = 15-29 mL/min/1 73 square meters)  •  Stage 5 End Stage CKD (GFR <15 mL/min/1 73 square meters)  Note: GFR calculation is accurate only with a steady state creatinine    HS Troponin 0hr (reflex protocol) [[id number]]  (Abnormal) Collected: 11/17/22 1327    Lab Status: Final result Specimen: Blood from Arm, Right Updated: 11/17/22 1400     hs TnI 0hr 226 ng/L     Protime-INR [[id number]]  (Abnormal) Collected: 11/17/22 1327    Lab Status: Final result Specimen: Blood from Arm, Right Updated: 11/17/22 1355     Protime 22 2 seconds      INR 1 93    APTT [[id number]] (Normal) Collected: 11/17/22 1327    Lab Status: Final result Specimen: Blood from Arm, Right Updated: 11/17/22 1355     PTT 33 seconds     POCT occult blood stool [[id number]]  (Abnormal) Resulted: 11/17/22 1336    Lab Status: Final result Specimen: Stool Updated: 11/17/22 1336     EXT Fecal Occult Blood Positive     Control Valid    CBC and differential [[id number]]  (Abnormal) Collected: 11/17/22 1327    Lab Status: Final result Specimen: Blood from Arm, Right Updated: 11/17/22 1332     WBC 11 96 Thousand/uL      RBC 2 78 Million/uL      Hemoglobin 7 1 g/dL      Hematocrit 23 8 %      MCV 86 fL      MCH 25 5 pg      MCHC 29 8 g/dL      RDW 19 4 %      MPV 8 9 fL      Platelets 627 Thousands/uL      nRBC 0 /100 WBCs      Neutrophils Relative 88 %      Immat GRANS % 1 %      Lymphocytes Relative 5 %      Monocytes Relative 6 %      Eosinophils Relative 0 %      Basophils Relative 0 %      Neutrophils Absolute 10 64 Thousands/µL      Immature Grans Absolute 0 10 Thousand/uL      Lymphocytes Absolute 0 55 Thousands/µL      Monocytes Absolute 0 66 Thousand/µL      Eosinophils Absolute 0 00 Thousand/µL      Basophils Absolute 0 01 Thousands/µL                  XR chest 1 view portable   Final Result by Brandy Em MD (11/17 1506)      Cardiomegaly with moderate congestive changes              Workstation performed: [id number]                    Procedures  ECG 12 Lead Documentation Only    Date/Time: 11/17/2022 6:18 PM  Performed by: Octavia Cohen MD  Authorized by: Octavia Cohen MD     Indications / Diagnosis:  SOB  ECG reviewed by me, the ED Provider: yes    Patient location:  ED  Interpretation:     Interpretation: abnormal    Rate:     ECG rate:  70  Rhythm:     Rhythm: paced    Pacing:     Capture:  Complete    Type of pacing:  Ventricular  Ectopy:     Ectopy: none    ST segments:     ST segments:  Normal  T waves:     T waves: inverted      Inverted:  II, III, aVF, V4, V5 and V6             ED Course  ED Course as of 11/17/22 1820   u Nov 17, 2022   1447 Consult to Dr Isabel Boston, cardiology, for elevated trop  1458 Consult to GI for GI bleed  1500 Dr Isabel Boston recommends serial troponin  No heparin gtt at this time  1510 GI recommends clear liquid diet, NPO after midnight, and likely endoscopy tomorrow  1530 Admission accepted to Dr Ildefonso Chowdhury  MDM    Disposition  Final diagnoses:   Elevated troponin   GI bleed   SHAWNEE (acute kidney injury) ([facility] )     Time reflects when diagnosis was documented in both MDM as applicable and the Disposition within this note     Time User Action Codes Description Comment    11/17/2022  2:56 PM Radha Lev Add [R77 8] Elevated troponin     11/17/2022  2:57 PM Radha Lev Add [K92 2] GI bleed     11/17/2022  3:17 PM Radha Lev Add [N17 9] [facility]     11/17/2022  6:18 PM Lukati Osgood Add [D50 9] Iron deficiency anemia, unspecified iron deficiency anemia type     11/17/2022  6:18 PM Karsten Lema Add [K21 9] Gastro-esophageal reflux disease without esophagitis     11/17/2022  6:18 PM Karsten Lema Add [R13 14] Dysphagia, pharyngoesophageal phase       ED Disposition     ED Disposition   Admit    Condition   Stable    Date/Time   Thu Nov 17, 2022  3:18 PM    Comment   Case was discussed with Dr Ildefonso Chowdhury and the patient's admission status was agreed to be Admission Status: inpatient status to the service of Dr Ildefonso Chowdhury              Follow-up Information    None         Current Discharge Medication List      CONTINUE these medications which have NOT CHANGED    Details   Albuterol Sulfate (albuterol, FOR EMS ONLY,) (2 5 mg/3 mL) 0 083 % nebulizer solution Take 2 5 mg by nebulization every 6 (six) hours as needed for wheezing      allopurinol (ZYLOPRIM) 100 mg tablet Take 2 tablets (200 mg total) by mouth daily  Qty: 180 tablet, Refills: 3    Associated Diagnoses: Hyperuricemia      clopidogrel (Plavix) 75 mg tablet Take 1 tablet (75 mg total) by mouth daily  Qty: 90 tablet, Refills: 1    Associated Diagnoses: Coronary artery arteriosclerosis      !! furosemide (LASIX) 20 mg tablet Take two tablet total 40 mg in a m  and one tablet 20 mg in p m  Qty: 270 tablet, Refills: 3    Associated Diagnoses: Closed fracture of right femur, unspecified fracture morphology, [facility]      ! ! furosemide (LASIX) 80 mg tablet       rivaroxaban (XARELTO) 15 mg tablet Take 1 tablet (15 mg total) by mouth daily with breakfast    Associated Diagnoses: Chronic a-fib (HCC)      Cranberry 1000 MG CAPS Take 1 tablet by mouth 2 (two) times a day  Dapagliflozin Propanediol 5 MG TABS Take by mouth daily      DULoxetine (CYMBALTA) 60 mg delayed release capsule TAKE 1 CAPSULE TWICE DAILY  Qty: 60 capsule, Refills: 1    Associated Diagnoses: Radiculopathy of lumbar region      finasteride (PROSCAR) 5 mg tablet TAKE 1 TABLET DAILY  Qty: 90 tablet, Refills: 1    Associated Diagnoses: Prostate disorder      fluticasone-umeclidinium-vilanterol (Trelegy Ellipta) 200-62 5-25 MCG/INH AEPB inhaler Inhale 1 puff daily Rinse mouth after use    Qty: 180 blister, Refills: 3    Associated Diagnoses: Centrilobular emphysema (HCC)      gabapentin (NEURONTIN) 100 mg capsule Take 1 capsule (100 mg total) by mouth daily at bedtime  Qty: 30 capsule, Refills: 1    Associated Diagnoses: Radiculopathy of lumbar region      isosorbide mononitrate (IMDUR) 30 mg 24 hr tablet       mupirocin (BACTROBAN) 2 % ointment Apply topically 3 (three) times a day  Qty: 22 g, Refills: 0    Associated Diagnoses: Wound of skin      polyethylene glycol (MIRALAX) 17 g packet Take 17 g by mouth daily as needed      predniSONE 20 mg tablet Take 3 tablets daily for 4 days then 2 tablets for 4 days then 1 tablet for 4 days then 1/2 tablet for 4 days and stop  Qty: 30 tablet, Refills: 0    Comments: Extra tabs  Associated Diagnoses: Acute exacerbation of chronic obstructive pulmonary disease (COPD) (HCC)      simvastatin (ZOCOR) 20 mg tablet TAKE 1 TABLET DAILY AT     BEDTIME  Qty: 90 tablet, Refills: 1    Associated Diagnoses: Coronary artery arteriosclerosis      spironolactone (ALDACTONE) 25 mg tablet Take 25 mg by mouth daily       ! ! - Potential duplicate medications found  Please discuss with provider  No discharge procedures on file      PDMP Review       Value Time User    PDMP Reviewed  Yes 2/19/2022 10:29 AM Rios [facility] ([place], [place])          ED Provider  Electronically Signed by           Sinan Richardson MD  11/17/22 4586

## 2022-11-17 NOTE — CONSULTS
Consultation - [facility] Gastroenterology   Lana Plaster 80 y o  male MRN: [id number]  Unit/Bed#: ED 04 Encounter: [id number]        Inpatient consult to gastroenterology  Consult performed by: NAVA Yeboah  Consult ordered by: Gerardo Soto MD          Reason for Consult / Principal Problem:           ASSESSMENT AND PLAN:        This is an 80 y o  male with history of CAD w/ PCI x2 on Plavix, AFib on Xarelto, pacemaker, CHF, severe AS, HTN, CKD 3, hyponatremia, pulmonary emphysema, sleep apnea, anxiety, and cholecystectomy who presented to the ED due to shortness of breath, found to have Hgb 7 1 down from 9 6 at the end of September, elevated kidney function from baseline, elevated troponin, & findings c/w CHF exacerbation  GI consulted due to worsening anemia, heme positive brown stool  1  Symptomatic anemia, occult positive brown stool  Hgb 9 6 in September, 7 1 on admission  Patient appears to have a history of chronic anemia w/ BL 9-10 which then worsened after hospitalization for femur fx in January of this year & subsequently improved w/ iron supplementation however he was taken off iron supplementation in July & Hgb has been trending down since that time  He reports he had black stool while on iron supplementation but it has been brown since supplementation was stopped  Denies any hematochezia, hematemesis, coffee-ground emesis, hematuria  He was found to have brown heme positive stool on rectal exam  He reports he did fall recently about 3 weeks ago but landed in a pile of mulch w/ no injuries  He is on Plavix for history of CAD with PCI (last PCI 2019), and Xarelto for history of Afib and he does have a history of nose bleeds w/ a significant nosebleed in July requiring ED intervention and otherwise reports easy bleeding, bruising w/ bruising on bilateral arms   His last EGD/Colonoscopy was >10 years ago and he doesn't recall what was found      -monitor H&H, transfuse blood products as needed    -start pantoprazole IV b i d     -okay for diet tonight, NPO past midnight    -if patient's respiratory status improves after diuresis, and cleared by Cardiology due to elevated troponin, can pursue EGD tomorrow  Prep and procedure explained  Would also recommend colonoscopy but this can likely be done as an outpatient pending course      -if hemoglobin does not rise appropriately with blood transfusion, consider checking CT abdomen pelvis to rule out retroperitoneal hematoma   -Follow up iron panel  -Pt has a history of severe aortic stenosis & total bilirubin elevated slightly on admission, consider also checking for hemolytic anemia   -consider reduction of anti-platelet/anticoagulation if able to reduce risk of bleeding    #2 Dysphagia  Patient reports history of dysphagia about once a month where he feels food getting stuck in his esophagus before going down  Denies any heartburn, odynophagia, unintended weight loss, abdominal pain  Not on any acid suppression  -will further evaluate with EGD     #3 Chronic constipation   Likely secondary to medication side effects, sedentary lifestyle, diet  He had to come off iron supplement in July due to constipation  He reports he normally takes Colace b i d , MiraLax 1 5 capfuls daily which helps, but prior to admission didn't move his bowels 3 days in a row so took 2 caps of Miralax yesterday and subsequently had 3 brown BMs   -continue bowel regimen w/ Miralax daily; hold for diarrhea    Thank you for the consultation  Case discussed with Gaby Winters        ______________________________________________________________________    HPI: Mandi Means is a 80 y o  male with history of CAD w/ PCIx2 on Plavix, AFib on Xarelto, pacemaker, CHF, HTN, CKD 3, hyponatremia, pulmonary emphysema, sleep apnea, anxiety, and cholecystectomy who presented to the ED due to symptomatic anemia   He reports experiencing increased shortness of breath for the last 2 weeks and called his pulmonologist Tuesday and subsequently placed on prednisone  In the ED he had labs done which showed hemoglobin of 7 1, MCV 86, BUN 48/creatinine 2, total bilirubin 1 07, troponin 226, proBNP 7,666, WBC 11 96  Chest x-ray showed cardiomegaly with moderate congestive changes  He had brown heme-positive stool on rectal exam     He has a history of anemia suspected secondary to CKD w/ baseline around 9-10 in 9680-4577, however after femur fracture and surgery in January hemoglobin drifted down and pt was placed on iron supplementation  He reports while he was on iron supplementation his stool was black but then returned to normal color after he stopped taking it in July due to constipation  Since, then hemoglobin has been trending back down  He has a history of nose bleeds, last significant nose bleed was in July requiring ED intervention  He has bruising on arms, various scabs  Denies any BRBPR  He states he did not have a bowel movement for 3 days so took 2 scoops of MiraLax yesterday, and was finally able to move his bowels 2-3 times yesterday w/ "chocolate' colored stool  He reports he had an EGD and colonoscopy >10 years ago and does not recall was found  Denies any heartburn but does report trouble swallowing about once a month feeling like things get stuck in his throat  Denies any nausea or vomiting, abdominal pain, unintended weight loss, NSAID use  Reports history of constipation maintained on Colace b i d , MiraLax 1 5 scoops daily  Denies any family history of GI malignancy  REVIEW OF SYSTEMS:    CONSTITUTIONAL: Denies any fever, chills, rigors, and weight loss  HEENT: No earache or tinnitus  Denies hearing loss or visual disturbances  CARDIOVASCULAR: No chest pain or palpitations  RESPIRATORY: Denies any cough, hemoptysis, shortness of breath or dyspnea on exertion  GASTROINTESTINAL: As noted in the History of Present Illness     GENITOURINARY: No problems with urination  Denies any hematuria or dysuria  NEUROLOGIC: No dizziness or vertigo, denies headaches  MUSCULOSKELETAL: Denies any muscle or joint pain  SKIN: Denies skin rashes or itching  ENDOCRINE: Denies excessive thirst  Denies intolerance to heat or cold  PSYCHOSOCIAL: Denies depression or anxiety  Denies any recent memory loss  Historical Information   Past Medical History:   Diagnosis Date   • Arthritis    • Atrial flutter ([facility] [address] )    • Chronic kidney disease     stage 3   • Coronary artery disease     2 stents   • Fluid retention    • Gout    • Heart disease    • Heart failure (HCC)     pacemaker   • Hypertension    • Hyponatremia 04/08/2019   • Neurological disorder    • Pacemaker    • Pulmonary emphysema ([facility] [address] )    • Radiculopathy     last assessed 1/28/16    • Shortness of breath     exertion   • Sleep apnea     c pap     Past Surgical History:   Procedure Laterality Date   • ANGIOPLASTY      x2 2 stents and then replaced   • CARDIAC PACEMAKER PLACEMENT      pacemaker permanent placement dual chamber / last assessed 4/7/14 / implantation    • CARDIAC SURGERY      pacemaker   • CHOLECYSTECTOMY     • CORONARY ANGIOPLASTY WITH STENT PLACEMENT     • EPIDURAL BLOCK INJECTION N/A 05/26/2016    Procedure: BLOCK / INJECTION EPIDURAL STEROID LUMBAR  L4-5;  Surgeon: Roe Chavira MD;  Location: [facility] MAIN OR;  Service:    • EPIDURAL BLOCK INJECTION N/A 02/14/2019    Procedure: L4 L5 Lumbar Epidural Steroid Injection;  Surgeon: Roe Chavira MD;  Location: [facility] MAIN OR;  Service: Pain Management    • EYE SURGERY      cataract left   • HAND SURGERY     • HIP PINNING Left    • INSERT / REPLACE / REMOVE PACEMAKER     • KNEE ARTHROSCOPY W/ MENISCAL REPAIR Left    • KNEE SURGERY     • LUMBAR EPIDURAL INJECTION N/A 03/17/2016    Procedure: BLOCK / INJECTION LUMBAR  L4-5  (C-ARM);   Surgeon: Roe Chavira MD;  Location: [facility] MAIN OR;  Service:    • [place] OPEN RX FEMUR FX+INTRAMED ELLA Right 01/31/2022 Procedure: INSERTION NAIL IM FEMUR ANTEGRADE (TROCHANTERIC); Surgeon: Arabella Vaz MD;  Location: [facility] Main OR;  Service: Orthopedics     Social History   Social History     Substance and Sexual Activity   Alcohol Use Never     Social History     Substance and Sexual Activity   Drug Use No     Social History     Tobacco Use   Smoking Status Every Day   • Packs/day: 0 25   • Years: 50 00   • Pack years: 12 50   • Types: Cigarettes   • Start date: 2022   Smokeless Tobacco Former   Tobacco Comments    quit 2021     Family History   Problem Relation Age of Onset   • Cancer Mother 80        Cancer when 80 yrs old   • Heart disease Mother    • Hypertension Mother    • Heart disease Father    • Cancer Father         Heart   46   • Diabetes Neg Hx    • Stroke Neg Hx        Meds/Allergies     (Not in a hospital admission)    No current facility-administered medications for this encounter  No Known Allergies        Objective     Blood pressure 136/63, pulse 72, temperature 97 5 °F (36 4 °C), temperature source Tympanic, resp  rate 20, weight 122 kg (269 lb), SpO2 94 %  Body mass index is 35 49 kg/m²  No intake or output data in the 24 hours ending 22 1554      PHYSICAL EXAM:      General Appearance:   Alert, cooperative, no distress   HEENT:   Normocephalic, atraumatic, anicteric  Neck:  Supple, symmetrical, trachea midline   Lungs:   Rhonchi/wheezes on auscultation bilaterally   Heart[de-identified]   Regular rate and rhythm; no murmur, rub, or gallop     Abdomen:   Soft, non-tender, non-distended; normal bowel sounds; no masses, no organomegaly    Genitalia:   Deferred    Rectal:   Deferred    Extremities:  No cyanosis, clubbing; bilateral lower extremity edema    Pulses:  2+ and symmetric all extremities    Skin:  No jaundice, rashes, or lesions    Lymph nodes:  No palpable cervical lymphadenopathy        Lab Results:   Admission on 2022   Component Date Value   • WBC 2022 11 96 (H)    • RBC 11/17/2022 2 78 (L)    • Hemoglobin 11/17/2022 7 1 (L)    • Hematocrit 11/17/2022 23 8 (L)    • MCV 11/17/2022 86    • MCH 11/17/2022 25 5 (L)    • MCHC 11/17/2022 29 8 (L)    • RDW 11/17/2022 19 4 (H)    • MPV 11/17/2022 8 9    • Platelets 03/33/6335 236    • nRBC 11/17/2022 0    • Neutrophils Relative 11/17/2022 88 (H)    • Immat GRANS % 11/17/2022 1    • Lymphocytes Relative 11/17/2022 5 (L)    • Monocytes Relative 11/17/2022 6    • Eosinophils Relative 11/17/2022 0    • Basophils Relative 11/17/2022 0    • Neutrophils Absolute 11/17/2022 10 64 (H)    • Immature Grans Absolute 11/17/2022 0 10    • Lymphocytes Absolute 11/17/2022 0 55 (L)    • Monocytes Absolute 11/17/2022 0 66    • Eosinophils Absolute 11/17/2022 0 00    • Basophils Absolute 11/17/2022 0 01    • Sodium 11/17/2022 131 (L)    • Potassium 11/17/2022 4 4    • Chloride 11/17/2022 98    • CO2 11/17/2022 28    • ANION GAP 11/17/2022 5    • BUN 11/17/2022 48 (H)    • Creatinine 11/17/2022 2 00 (H)    • Glucose 11/17/2022 128    • Calcium 11/17/2022 9 1    • Corrected Calcium 11/17/2022 9 9    • AST 11/17/2022     • ALT 11/17/2022 25    • Alkaline Phosphatase 11/17/2022 81    • Total Protein 11/17/2022 7 9    • Albumin 11/17/2022 3 0 (L)    • Total Bilirubin 11/17/2022 1 07 (H)    • eGFR 11/17/2022 30    • hs TnI 0hr 11/17/2022 226 (H)    • Protime 11/17/2022 22 2 (H)    • INR 11/17/2022 1 93 (H)    • PTT 11/17/2022 33    • EXT Fecal Occult Blood 11/17/2022 Positive (A)    • Control 11/17/2022 Valid    • ABO Grouping 11/17/2022 AB    • Rh Factor 11/17/2022 Positive    • Antibody Screen 11/17/2022 Negative    • Specimen Expiration Date 11/17/2022 [id number]    • hs TnI 2hr 11/17/2022 230 (H)    • Delta 2hr hsTnI 11/17/2022 4    • Ventricular Rate 11/17/2022 70    • Atrial Rate 11/17/2022 72    • QRSD Interval 11/17/2022 92    • QT Interval 11/17/2022 418    • QTC Interval 11/17/2022 451    • QRS Axis 11/17/2022 47    • T Wave Axis 11/17/2022 269 Imaging Studies: I have personally reviewed pertinent imaging studies

## 2022-11-17 NOTE — H&P (VIEW-ONLY)
Consultation - [facility] Gastroenterology   Ivonne Johnson 80 y o  male MRN: [id number]  Unit/Bed#: ED 04 Encounter: [id number]        Inpatient consult to gastroenterology  Consult performed by: NAVA Forrest  Consult ordered by: Octavia Cohen MD          Reason for Consult / Principal Problem:           ASSESSMENT AND PLAN:        This is an 80 y o  male with history of CAD w/ PCI x2 on Plavix, AFib on Xarelto, pacemaker, CHF, severe AS, HTN, CKD 3, hyponatremia, pulmonary emphysema, sleep apnea, anxiety, and cholecystectomy who presented to the ED due to shortness of breath, found to have Hgb 7 1 down from 9 6 at the end of September, elevated kidney function from baseline, elevated troponin, & findings c/w CHF exacerbation  GI consulted due to worsening anemia, heme positive brown stool  1  Symptomatic anemia, occult positive brown stool  Hgb 9 6 in September, 7 1 on admission  Patient appears to have a history of chronic anemia w/ BL 9-10 which then worsened after hospitalization for femur fx in January of this year & subsequently improved w/ iron supplementation however he was taken off iron supplementation in July & Hgb has been trending down since that time  He reports he had black stool while on iron supplementation but it has been brown since supplementation was stopped  Denies any hematochezia, hematemesis, coffee-ground emesis, hematuria  He was found to have brown heme positive stool on rectal exam  He reports he did fall recently about 3 weeks ago but landed in a pile of mulch w/ no injuries  He is on Plavix for history of CAD with PCI (last PCI 2019), and Xarelto for history of Afib and he does have a history of nose bleeds w/ a significant nosebleed in July requiring ED intervention and otherwise reports easy bleeding, bruising w/ bruising on bilateral arms   His last EGD/Colonoscopy was >10 years ago and he doesn't recall what was found      -monitor H&H, transfuse blood products as needed    -start pantoprazole IV b i d     -okay for diet tonight, NPO past midnight    -if patient's respiratory status improves after diuresis, and cleared by Cardiology due to elevated troponin, can pursue EGD tomorrow  Prep and procedure explained  Would also recommend colonoscopy but this can likely be done as an outpatient pending course      -if hemoglobin does not rise appropriately with blood transfusion, consider checking CT abdomen pelvis to rule out retroperitoneal hematoma   -Follow up iron panel  -Pt has a history of severe aortic stenosis & total bilirubin elevated slightly on admission, consider also checking for hemolytic anemia   -consider reduction of anti-platelet/anticoagulation if able to reduce risk of bleeding    #2 Dysphagia  Patient reports history of dysphagia about once a month where he feels food getting stuck in his esophagus before going down  Denies any heartburn, odynophagia, unintended weight loss, abdominal pain  Not on any acid suppression  -will further evaluate with EGD     #3 Chronic constipation   Likely secondary to medication side effects, sedentary lifestyle, diet  He had to come off iron supplement in July due to constipation  He reports he normally takes Colace b i d , MiraLax 1 5 capfuls daily which helps, but prior to admission didn't move his bowels 3 days in a row so took 2 caps of Miralax yesterday and subsequently had 3 brown BMs   -continue bowel regimen w/ Miralax daily; hold for diarrhea    Thank you for the consultation  Case discussed with Facundo Campbell        ______________________________________________________________________    HPI: Krishna Venegas is a 80 y o  male with history of CAD w/ PCIx2 on Plavix, AFib on Xarelto, pacemaker, CHF, HTN, CKD 3, hyponatremia, pulmonary emphysema, sleep apnea, anxiety, and cholecystectomy who presented to the ED due to symptomatic anemia   He reports experiencing increased shortness of breath for the last 2 weeks and called his pulmonologist Tuesday and subsequently placed on prednisone  In the ED he had labs done which showed hemoglobin of 7 1, MCV 86, BUN 48/creatinine 2, total bilirubin 1 07, troponin 226, proBNP 7,666, WBC 11 96  Chest x-ray showed cardiomegaly with moderate congestive changes  He had brown heme-positive stool on rectal exam     He has a history of anemia suspected secondary to CKD w/ baseline around 9-10 in 9822-8787, however after femur fracture and surgery in January hemoglobin drifted down and pt was placed on iron supplementation  He reports while he was on iron supplementation his stool was black but then returned to normal color after he stopped taking it in July due to constipation  Since, then hemoglobin has been trending back down  He has a history of nose bleeds, last significant nose bleed was in July requiring ED intervention  He has bruising on arms, various scabs  Denies any BRBPR  He states he did not have a bowel movement for 3 days so took 2 scoops of MiraLax yesterday, and was finally able to move his bowels 2-3 times yesterday w/ "chocolate' colored stool  He reports he had an EGD and colonoscopy >10 years ago and does not recall was found  Denies any heartburn but does report trouble swallowing about once a month feeling like things get stuck in his throat  Denies any nausea or vomiting, abdominal pain, unintended weight loss, NSAID use  Reports history of constipation maintained on Colace b i d , MiraLax 1 5 scoops daily  Denies any family history of GI malignancy  REVIEW OF SYSTEMS:    CONSTITUTIONAL: Denies any fever, chills, rigors, and weight loss  HEENT: No earache or tinnitus  Denies hearing loss or visual disturbances  CARDIOVASCULAR: No chest pain or palpitations  RESPIRATORY: Denies any cough, hemoptysis, shortness of breath or dyspnea on exertion  GASTROINTESTINAL: As noted in the History of Present Illness     GENITOURINARY: No problems with urination  Denies any hematuria or dysuria  NEUROLOGIC: No dizziness or vertigo, denies headaches  MUSCULOSKELETAL: Denies any muscle or joint pain  SKIN: Denies skin rashes or itching  ENDOCRINE: Denies excessive thirst  Denies intolerance to heat or cold  PSYCHOSOCIAL: Denies depression or anxiety  Denies any recent memory loss  Historical Information   Past Medical History:   Diagnosis Date   • Arthritis    • Atrial flutter ([facility] [address] )    • Chronic kidney disease     stage 3   • Coronary artery disease     2 stents   • Fluid retention    • Gout    • Heart disease    • Heart failure (HCC)     pacemaker   • Hypertension    • Hyponatremia 04/08/2019   • Neurological disorder    • Pacemaker    • Pulmonary emphysema ([facility] [address] )    • Radiculopathy     last assessed 1/28/16    • Shortness of breath     exertion   • Sleep apnea     c pap     Past Surgical History:   Procedure Laterality Date   • ANGIOPLASTY      x2 2 stents and then replaced   • CARDIAC PACEMAKER PLACEMENT      pacemaker permanent placement dual chamber / last assessed 4/7/14 / implantation    • CARDIAC SURGERY      pacemaker   • CHOLECYSTECTOMY     • CORONARY ANGIOPLASTY WITH STENT PLACEMENT     • EPIDURAL BLOCK INJECTION N/A 05/26/2016    Procedure: BLOCK / INJECTION EPIDURAL STEROID LUMBAR  L4-5;  Surgeon: Suzanna Mendez MD;  Location: [facility] MAIN OR;  Service:    • EPIDURAL BLOCK INJECTION N/A 02/14/2019    Procedure: L4 L5 Lumbar Epidural Steroid Injection;  Surgeon: Suzanna Mendez MD;  Location: [facility] MAIN OR;  Service: Pain Management    • EYE SURGERY      cataract left   • HAND SURGERY     • HIP PINNING Left    • INSERT / REPLACE / REMOVE PACEMAKER     • KNEE ARTHROSCOPY W/ MENISCAL REPAIR Left    • KNEE SURGERY     • LUMBAR EPIDURAL INJECTION N/A 03/17/2016    Procedure: BLOCK / INJECTION LUMBAR  L4-5  (C-ARM);   Surgeon: Suzanna Mendez MD;  Location: [facility] MAIN OR;  Service:    • [place] OPEN RX FEMUR FX+INTRAMED ELLA Right 01/31/2022 Procedure: INSERTION NAIL IM FEMUR ANTEGRADE (TROCHANTERIC); Surgeon: Nicolás Ayala MD;  Location: [facility] Main OR;  Service: Orthopedics     Social History   Social History     Substance and Sexual Activity   Alcohol Use Never     Social History     Substance and Sexual Activity   Drug Use No     Social History     Tobacco Use   Smoking Status Every Day   • Packs/day: 0 25   • Years: 50 00   • Pack years: 12 50   • Types: Cigarettes   • Start date: 2022   Smokeless Tobacco Former   Tobacco Comments    quit 2021     Family History   Problem Relation Age of Onset   • Cancer Mother 80        Cancer when 80 yrs old   • Heart disease Mother    • Hypertension Mother    • Heart disease Father    • Cancer Father         Heart   46   • Diabetes Neg Hx    • Stroke Neg Hx        Meds/Allergies     (Not in a hospital admission)    No current facility-administered medications for this encounter  No Known Allergies        Objective     Blood pressure 136/63, pulse 72, temperature 97 5 °F (36 4 °C), temperature source Tympanic, resp  rate 20, weight 122 kg (269 lb), SpO2 94 %  Body mass index is 35 49 kg/m²  No intake or output data in the 24 hours ending 22 3184      PHYSICAL EXAM:      General Appearance:   Alert, cooperative, no distress   HEENT:   Normocephalic, atraumatic, anicteric  Neck:  Supple, symmetrical, trachea midline   Lungs:   Rhonchi/wheezes on auscultation bilaterally   Heart[de-identified]   Regular rate and rhythm; no murmur, rub, or gallop     Abdomen:   Soft, non-tender, non-distended; normal bowel sounds; no masses, no organomegaly    Genitalia:   Deferred    Rectal:   Deferred    Extremities:  No cyanosis, clubbing; bilateral lower extremity edema    Pulses:  2+ and symmetric all extremities    Skin:  No jaundice, rashes, or lesions    Lymph nodes:  No palpable cervical lymphadenopathy        Lab Results:   Admission on 2022   Component Date Value   • WBC 2022 11 96 (H)    • RBC 11/17/2022 2 78 (L)    • Hemoglobin 11/17/2022 7 1 (L)    • Hematocrit 11/17/2022 23 8 (L)    • MCV 11/17/2022 86    • MCH 11/17/2022 25 5 (L)    • MCHC 11/17/2022 29 8 (L)    • RDW 11/17/2022 19 4 (H)    • MPV 11/17/2022 8 9    • Platelets 91/81/1912 236    • nRBC 11/17/2022 0    • Neutrophils Relative 11/17/2022 88 (H)    • Immat GRANS % 11/17/2022 1    • Lymphocytes Relative 11/17/2022 5 (L)    • Monocytes Relative 11/17/2022 6    • Eosinophils Relative 11/17/2022 0    • Basophils Relative 11/17/2022 0    • Neutrophils Absolute 11/17/2022 10 64 (H)    • Immature Grans Absolute 11/17/2022 0 10    • Lymphocytes Absolute 11/17/2022 0 55 (L)    • Monocytes Absolute 11/17/2022 0 66    • Eosinophils Absolute 11/17/2022 0 00    • Basophils Absolute 11/17/2022 0 01    • Sodium 11/17/2022 131 (L)    • Potassium 11/17/2022 4 4    • Chloride 11/17/2022 98    • CO2 11/17/2022 28    • ANION GAP 11/17/2022 5    • BUN 11/17/2022 48 (H)    • Creatinine 11/17/2022 2 00 (H)    • Glucose 11/17/2022 128    • Calcium 11/17/2022 9 1    • Corrected Calcium 11/17/2022 9 9    • AST 11/17/2022     • ALT 11/17/2022 25    • Alkaline Phosphatase 11/17/2022 81    • Total Protein 11/17/2022 7 9    • Albumin 11/17/2022 3 0 (L)    • Total Bilirubin 11/17/2022 1 07 (H)    • eGFR 11/17/2022 30    • hs TnI 0hr 11/17/2022 226 (H)    • Protime 11/17/2022 22 2 (H)    • INR 11/17/2022 1 93 (H)    • PTT 11/17/2022 33    • EXT Fecal Occult Blood 11/17/2022 Positive (A)    • Control 11/17/2022 Valid    • ABO Grouping 11/17/2022 AB    • Rh Factor 11/17/2022 Positive    • Antibody Screen 11/17/2022 Negative    • Specimen Expiration Date 11/17/2022 [id number]    • hs TnI 2hr 11/17/2022 230 (H)    • Delta 2hr hsTnI 11/17/2022 4    • Ventricular Rate 11/17/2022 70    • Atrial Rate 11/17/2022 72    • QRSD Interval 11/17/2022 92    • QT Interval 11/17/2022 418    • QTC Interval 11/17/2022 451    • QRS Axis 11/17/2022 47    • T Wave Axis 11/17/2022 269 Imaging Studies: I have personally reviewed pertinent imaging studies

## 2022-11-18 ENCOUNTER — ANESTHESIA (INPATIENT)
Dept: PERIOP | Facility: HOSPITAL | Age: 82
End: 2022-11-18

## 2022-11-18 ENCOUNTER — ANESTHESIA EVENT (INPATIENT)
Dept: PERIOP | Facility: HOSPITAL | Age: 82
End: 2022-11-18

## 2022-11-18 ENCOUNTER — APPOINTMENT (OUTPATIENT)
Dept: PERIOP | Facility: HOSPITAL | Age: 82
End: 2022-11-18

## 2022-11-18 PROBLEM — E11.9 DIABETES MELLITUS, TYPE 2 (HCC): Status: ACTIVE | Noted: 2022-02-07

## 2022-11-18 PROBLEM — K92.2 GI BLEEDING: Status: ACTIVE | Noted: 2022-11-18

## 2022-11-18 LAB
ABO GROUP BLD BPU: NORMAL
ANION GAP SERPL CALCULATED.3IONS-SCNC: 10 MMOL/L (ref 4–13)
BPU ID: NORMAL
BUN SERPL-MCNC: 52 MG/DL (ref 5–25)
CALCIUM SERPL-MCNC: 8.7 MG/DL (ref 8.3–10.1)
CHLORIDE SERPL-SCNC: 101 MMOL/L (ref 96–108)
CO2 SERPL-SCNC: 24 MMOL/L (ref 21–32)
CREAT SERPL-MCNC: 1.83 MG/DL (ref 0.6–1.3)
CROSSMATCH: NORMAL
ERYTHROCYTE [DISTWIDTH] IN BLOOD BY AUTOMATED COUNT: 19.1 % (ref 11.6–15.1)
FERRITIN SERPL-MCNC: 160 NG/ML (ref 8–388)
GFR SERPL CREATININE-BSD FRML MDRD: 33 ML/MIN/1.73SQ M
GLUCOSE SERPL-MCNC: 107 MG/DL (ref 65–140)
HCT VFR BLD AUTO: 23.6 % (ref 36.5–49.3)
HCT VFR BLD AUTO: 25.5 % (ref 36.5–49.3)
HGB BLD-MCNC: 7.2 G/DL (ref 12–17)
HGB BLD-MCNC: 7.6 G/DL (ref 12–17)
IRON SATN MFR SERPL: 7 % (ref 20–50)
IRON SERPL-MCNC: 24 UG/DL (ref 65–175)
MAGNESIUM SERPL-MCNC: 2.6 MG/DL (ref 1.6–2.6)
MCH RBC QN AUTO: 26.2 PG (ref 26.8–34.3)
MCHC RBC AUTO-ENTMCNC: 30.5 G/DL (ref 31.4–37.4)
MCV RBC AUTO: 86 FL (ref 82–98)
PLATELET # BLD AUTO: 162 THOUSANDS/UL (ref 149–390)
PMV BLD AUTO: 10.9 FL (ref 8.9–12.7)
POTASSIUM SERPL-SCNC: 5.2 MMOL/L (ref 3.5–5.3)
RBC # BLD AUTO: 2.75 MILLION/UL (ref 3.88–5.62)
SODIUM SERPL-SCNC: 135 MMOL/L (ref 135–147)
TIBC SERPL-MCNC: 355 UG/DL (ref 250–450)
UNIT DISPENSE STATUS: NORMAL
UNIT PRODUCT CODE: NORMAL
UNIT PRODUCT VOLUME: 350 ML
UNIT RH: NORMAL
WBC # BLD AUTO: 10.28 THOUSAND/UL (ref 4.31–10.16)

## 2022-11-18 PROCEDURE — 0DB18ZX EXCISION OF UPPER ESOPHAGUS, VIA NATURAL OR ARTIFICIAL OPENING ENDOSCOPIC, DIAGNOSTIC: ICD-10-PCS | Performed by: INTERNAL MEDICINE

## 2022-11-18 PROCEDURE — 0DB78ZX EXCISION OF STOMACH, PYLORUS, VIA NATURAL OR ARTIFICIAL OPENING ENDOSCOPIC, DIAGNOSTIC: ICD-10-PCS | Performed by: INTERNAL MEDICINE

## 2022-11-18 PROCEDURE — 0DB28ZX EXCISION OF MIDDLE ESOPHAGUS, VIA NATURAL OR ARTIFICIAL OPENING ENDOSCOPIC, DIAGNOSTIC: ICD-10-PCS | Performed by: INTERNAL MEDICINE

## 2022-11-18 RX ORDER — LIDOCAINE HYDROCHLORIDE 10 MG/ML
INJECTION, SOLUTION EPIDURAL; INFILTRATION; INTRACAUDAL; PERINEURAL AS NEEDED
Status: DISCONTINUED | OUTPATIENT
Start: 2022-11-18 | End: 2022-11-18

## 2022-11-18 RX ORDER — FLUCONAZOLE 100 MG/1
100 TABLET ORAL DAILY
Status: DISCONTINUED | OUTPATIENT
Start: 2022-11-19 | End: 2022-11-22 | Stop reason: HOSPADM

## 2022-11-18 RX ORDER — PROPOFOL 10 MG/ML
INJECTION, EMULSION INTRAVENOUS AS NEEDED
Status: DISCONTINUED | OUTPATIENT
Start: 2022-11-18 | End: 2022-11-18

## 2022-11-18 RX ORDER — SODIUM CHLORIDE, SODIUM LACTATE, POTASSIUM CHLORIDE, CALCIUM CHLORIDE 600; 310; 30; 20 MG/100ML; MG/100ML; MG/100ML; MG/100ML
INJECTION, SOLUTION INTRAVENOUS CONTINUOUS PRN
Status: DISCONTINUED | OUTPATIENT
Start: 2022-11-18 | End: 2022-11-18

## 2022-11-18 RX ADMIN — FUROSEMIDE 40 MG: 10 INJECTION, SOLUTION INTRAMUSCULAR; INTRAVENOUS at 17:31

## 2022-11-18 RX ADMIN — DULOXETINE HYDROCHLORIDE 60 MG: 60 CAPSULE, DELAYED RELEASE ORAL at 08:48

## 2022-11-18 RX ADMIN — PANTOPRAZOLE SODIUM 40 MG: 40 INJECTION, POWDER, FOR SOLUTION INTRAVENOUS at 08:47

## 2022-11-18 RX ADMIN — PROPOFOL 50 MG: 10 INJECTION, EMULSION INTRAVENOUS at 14:43

## 2022-11-18 RX ADMIN — ALLOPURINOL 200 MG: 100 TABLET ORAL at 08:48

## 2022-11-18 RX ADMIN — IPRATROPIUM BROMIDE AND ALBUTEROL SULFATE 3 ML: 2.5; .5 SOLUTION RESPIRATORY (INHALATION) at 13:10

## 2022-11-18 RX ADMIN — IRON SUCROSE 300 MG: 20 INJECTION, SOLUTION INTRAVENOUS at 17:31

## 2022-11-18 RX ADMIN — GABAPENTIN 100 MG: 100 CAPSULE ORAL at 21:59

## 2022-11-18 RX ADMIN — PROPOFOL 40 MG: 10 INJECTION, EMULSION INTRAVENOUS at 14:46

## 2022-11-18 RX ADMIN — ISOSORBIDE MONONITRATE 30 MG: 30 TABLET, EXTENDED RELEASE ORAL at 08:48

## 2022-11-18 RX ADMIN — FINASTERIDE 5 MG: 5 TABLET, FILM COATED ORAL at 08:48

## 2022-11-18 RX ADMIN — PROPOFOL 50 MG: 10 INJECTION, EMULSION INTRAVENOUS at 14:41

## 2022-11-18 RX ADMIN — FUROSEMIDE 40 MG: 10 INJECTION, SOLUTION INTRAMUSCULAR; INTRAVENOUS at 08:47

## 2022-11-18 RX ADMIN — SODIUM CHLORIDE, SODIUM LACTATE, POTASSIUM CHLORIDE, AND CALCIUM CHLORIDE: .6; .31; .03; .02 INJECTION, SOLUTION INTRAVENOUS at 14:36

## 2022-11-18 RX ADMIN — PRAVASTATIN SODIUM 40 MG: 40 TABLET ORAL at 17:31

## 2022-11-18 RX ADMIN — FLUTICASONE FUROATE AND VILANTEROL TRIFENATATE 1 PUFF: 100; 25 POWDER RESPIRATORY (INHALATION) at 08:49

## 2022-11-18 RX ADMIN — IPRATROPIUM BROMIDE AND ALBUTEROL SULFATE 3 ML: 2.5; .5 SOLUTION RESPIRATORY (INHALATION) at 02:17

## 2022-11-18 RX ADMIN — PANTOPRAZOLE SODIUM 40 MG: 40 INJECTION, POWDER, FOR SOLUTION INTRAVENOUS at 21:59

## 2022-11-18 RX ADMIN — POLYETHYLENE GLYCOL 3350 17 G: 17 POWDER, FOR SOLUTION ORAL at 08:49

## 2022-11-18 RX ADMIN — LIDOCAINE HYDROCHLORIDE 50 MG: 10 INJECTION, SOLUTION EPIDURAL; INFILTRATION; INTRACAUDAL; PERINEURAL at 14:41

## 2022-11-18 RX ADMIN — FUROSEMIDE 60 MG: 10 INJECTION, SOLUTION INTRAMUSCULAR; INTRAVENOUS at 05:28

## 2022-11-18 RX ADMIN — IPRATROPIUM BROMIDE AND ALBUTEROL SULFATE 3 ML: 2.5; .5 SOLUTION RESPIRATORY (INHALATION) at 19:49

## 2022-11-18 RX ADMIN — DULOXETINE HYDROCHLORIDE 60 MG: 60 CAPSULE, DELAYED RELEASE ORAL at 17:31

## 2022-11-18 RX ADMIN — IPRATROPIUM BROMIDE AND ALBUTEROL SULFATE 3 ML: 2.5; .5 SOLUTION RESPIRATORY (INHALATION) at 07:18

## 2022-11-18 NOTE — ASSESSMENT & PLAN NOTE
Wt Readings from Last 3 Encounters:   11/18/22 121 kg (267 lb)   10/20/22 120 kg (265 lb)   10/05/22 119 kg (262 lb)     Patient reports worsening leg swelling compared to baseline along with shortness of breath  ProBNP noted to be elevated with chest x-ray showing moderate congestive changes  · Received 1 dose of 60 mg of IV Lasix on admission post blood transfusion  · Continue 40mg of IV Lasix bid     · Monitor I/O and daily weights

## 2022-11-18 NOTE — ASSESSMENT & PLAN NOTE
Per outpatient pulmonary note, patient was started on prednisone taper due to shortness of breath  Hold off on prednisone in the setting of GI bleed  Shortness of breath is most likely due to anemia, CHF instead  Trilogy substituted with fluticasone/vilanterol  Place patient on DuoNeb q i d

## 2022-11-18 NOTE — ASSESSMENT & PLAN NOTE
Patient presented to the ER with shortness of breath and outpatient abnormal lab work done by patient's cardiologist   reports 2-3 "chocolate colored" BMs  · On admission, hemoglobin 7 1  Stool occult positive per ER physician  · Patient was on iron previously which he stopped taking few months ago which was causing dark/black colored stools  · Hemoglobin 7 2 after receiving 1 unit PRBC transfusion  · iron panel reviewed - start IV Venofer  · As per EGD performed today, no signs of upper GI bleeding present  Signs of mild gastritis but awaiting results of biopsy  Signs of Candida esophagitis  Begin Diflucan  · IV Protonix b i d   · Continue to monitor with repeat lab work in a m      Results from last 7 days   Lab Units 11/18/22  0519 11/17/22  1327   HEMOGLOBIN g/dL 7 2* 7 1*   HEMATOCRIT % 23 6* 23 8*   MCV fL 86 86

## 2022-11-18 NOTE — H&P
[id number] [facility] 1940, 80 y o  male MRN: [id number]  Unit/Bed#: [address] [place] [address] Encounter: [id number]  Primary Care Provider: Devendra Powell DO   Date and time admitted to hospital: 11/17/2022 12:08 PM    * Symptomatic anemia  Assessment & Plan  Patient presented to the ER with shortness of breath and outpatient abnormal lab work done by patient's cardiologist   reports 2-3 "chocolate colored" BMs  · Hemoglobin 7 1  Stool occult positive per ER physician  · Patient was on iron previously which he stopped taking few months ago which was causing dark/black colored stools  · 1 unit PRBC transfusion ordered  Check iron panel  · Plan of care was also coordinated with GI and tentative plan for EGD tomorrow if respiratory status stable  · IV Protonix b i d  · Clear liquid diet and NPO after midnight  · Repeat lab work in a m  Results from last 7 days   Lab Units 11/17/22  1327   HEMOGLOBIN g/dL 7 1*   HEMATOCRIT % 23 8*   MCV fL 86       Acute on chronic diastolic heart failure (HCC)  Assessment & Plan  Wt Readings from Last 3 Encounters:   11/17/22 122 kg (268 lb 12 8 oz)   10/20/22 120 kg (265 lb)   10/05/22 119 kg (262 lb)     Patient reports worsening leg swelling compared to baseline along with shortness of breath  ProBNP noted to be elevated with chest x-ray showing moderate congestive changes  · Will give 1 dose of 60 mg of IV Lasix today post blood transfusion  · Place patient on 40 mg of IV Lasix b i d   · I/O, daily weights        Elevated troponin  Assessment & Plan  Initial troponin 226 --> 230   Trend once more  Likely non MI troponin elevation in the setting of anemia, CHF  Cardiology consulted by ER    COPD (chronic obstructive pulmonary disease) ([facility] [address] )  Assessment & Plan  Per outpatient pulmonary note, patient was started on prednisone taper due to shortness of breath  Hold off on prednisone in the setting of GI bleed  Shortness of breath is most likely due to anemia, CHF instead  Trilogy substituted with fluticasone/vilanterol  Place patient on DuoNeb q i d  Obesity (BMI 30-39  9)  Assessment & Plan  Body mass index is 34 51 kg/m²  Dietary/lifestyle modifications    CAD (coronary artery disease)  Assessment & Plan  Status post stent placement  Continue Imdur, Pravachol  Holding Plavix    Stage 3a chronic kidney disease [facility])  Assessment & Plan  Lab Results   Component Value Date    EGFR 30 11/17/2022    EGFR 40 09/28/2022    EGFR 52 07/20/2022    CREATININE 2 00 (H) 11/17/2022    CREATININE 1 58 (H) 09/28/2022    CREATININE 1 35 07/20/2022     CKD stage 3 with baseline creatinine 1 3-1 5  Follows up with [facility] Nephrology as outpatient  · Creatinine of 2, likely cardiorenal in nature  · Monitor creatinine while on diuretics  · Repeat lab work in a m  VTE Pharmacologic Prophylaxis: VTE Score: 4 Moderate Risk (Score 3-4) - Pharmacological DVT Prophylaxis Contraindicated  Sequential Compression Devices Ordered  Code Status: Level 1 - Full Code   Discussion with family: Patient declined call to   Anticipated Length of Stay: Patient will be admitted on an inpatient basis with an anticipated length of stay of greater than 2 midnights secondary to Symptomatic anemia, elevated troponin, CHF exacerbation  Total Time for Visit, including Counseling / Coordination of Care: 45 minutes Greater than 50% of this total time spent on direct patient counseling and coordination of care  Chief Complaint:  Abnormal lab work, shortness of breath    History of Present Illness:  Elizabeth Merlos is a 80 y o  male who presents with abnormal lab work, shortness of breath  Patient was advised to come to the ER by his cardiologist due to abnormal labs  Unclear what the abnormalities were  Patient reports shortness of breath, cough over the last 2 weeks  The spoke with patient's pulmonologist who prescribed prednisone taper    Patient reports taking 2 days of prednisone so far  Patient states he was on iron in the past postop few months ago  States he has had 2-3 "chocolate colored" bowel movements recently  Denies any hematemesis, hematuria, hematochezia  Denies any use of NSAIDs    Review of Systems:  Review of Systems   Constitutional: Negative for appetite change, chills and fever  HENT: Negative for congestion and trouble swallowing  Eyes: Negative for photophobia and visual disturbance  Respiratory: Positive for cough and shortness of breath  Negative for chest tightness  Cardiovascular: Positive for leg swelling  Negative for chest pain  Gastrointestinal: Positive for constipation  Negative for abdominal pain, anal bleeding, diarrhea, nausea and vomiting  Genitourinary: Negative for dysuria, frequency and hematuria  Musculoskeletal: Negative for neck pain  Skin: Negative for wound  Neurological: Negative for dizziness and light-headedness  Hematological: Bruises/bleeds easily  Psychiatric/Behavioral: Negative for agitation         Past Medical and Surgical History:   Past Medical History:   Diagnosis Date   • Arthritis    • Atrial flutter [facility] )    • Chronic kidney disease     stage 3   • Coronary artery disease     2 stents   • Fluid retention    • Gout    • Heart disease    • Heart failure ([facility] [address] )     pacemaker   • Hypertension    • Hyponatremia 04/08/2019   • Neurological disorder    • Pacemaker    • Pulmonary emphysema ([facility] )    • Radiculopathy     last assessed 1/28/16    • Shortness of breath     exertion   • Sleep apnea     c pap       Past Surgical History:   Procedure Laterality Date   • ANGIOPLASTY      x2 2 stents and then replaced   • CARDIAC PACEMAKER PLACEMENT      pacemaker permanent placement dual chamber / last assessed 4/7/14 / implantation    • CARDIAC SURGERY      pacemaker   • CHOLECYSTECTOMY     • CORONARY ANGIOPLASTY WITH STENT PLACEMENT     • EPIDURAL BLOCK INJECTION N/A 05/26/2016    Procedure: BLOCK / INJECTION EPIDURAL STEROID LUMBAR  L4-5;  Surgeon: Marijean Lesches, MD;  Location: [facility] MAIN OR;  Service:    • EPIDURAL BLOCK INJECTION N/A 02/14/2019    Procedure: L4 L5 Lumbar Epidural Steroid Injection;  Surgeon: Marijean Lesches, MD;  Location: [facility] MAIN OR;  Service: Pain Management    • EYE SURGERY      cataract left   • HAND SURGERY     • HIP PINNING Left    • INSERT / REPLACE / REMOVE PACEMAKER     • KNEE ARTHROSCOPY W/ MENISCAL REPAIR Left    • KNEE SURGERY     • LUMBAR EPIDURAL INJECTION N/A 03/17/2016    Procedure: BLOCK / INJECTION LUMBAR  L4-5  (C-ARM); Surgeon: Marijean Lesches, MD;  Location: [facility] MAIN OR;  Service:    • OR OPEN RX FEMUR FX+INTRAMED ELLA Right 01/31/2022    Procedure: INSERTION NAIL IM FEMUR ANTEGRADE (TROCHANTERIC); Surgeon: Chelsey Jesus MD;  Location: [facility] Main OR;  Service: Orthopedics       Meds/Allergies:  Prior to Admission medications    Medication Sig Start Date End Date Taking? Authorizing Provider   Albuterol Sulfate (albuterol, FOR EMS ONLY,) (2 5 mg/3 mL) 0 083 % nebulizer solution Take 2 5 mg by nebulization every 6 (six) hours as needed for wheezing   Yes Historical Provider, MD   allopurinol (ZYLOPRIM) 100 mg tablet Take 2 tablets (200 mg total) by mouth daily 1/15/22  Yes Warden Aj MD   clopidogrel (Plavix) 75 mg tablet Take 1 tablet (75 mg total) by mouth daily 7/26/22  Yes Benjamin Chilel, DO   Cranberry 1000 MG CAPS Take 1 tablet by mouth 2 (two) times a day  Yes Historical Provider, MD   Dapagliflozin Propanediol 5 MG TABS Take by mouth daily   Yes Historical Provider, MD   DULoxetine (CYMBALTA) 60 mg delayed release capsule TAKE 1 CAPSULE TWICE DAILY 9/26/22  Yes Renetta Lerma DPM   finasteride (PROSCAR) 5 mg tablet TAKE 1 TABLET DAILY 9/24/22  Yes Benjamin Chilel, DO   fluticasone-umeclidinium-vilanterol (Trelegy Ellipta) 200-62 5-25 MCG/INH AEPB inhaler Inhale 1 puff daily Rinse mouth after use   12/17/21  Yes Edgar Davis PA-C   furosemide (LASIX) 20 mg tablet Take two tablet total 40 mg in a m  and one tablet 20 mg in p m  11/2/22  Yes Dannielle Anaya MD   gabapentin (NEURONTIN) 100 mg capsule Take 1 capsule (100 mg total) by mouth daily at bedtime 10/14/22 12/13/22 Yes Gertie Boas, DPM   isosorbide mononitrate (IMDUR) 30 mg 24 hr tablet  9/23/22  Yes Historical Provider, MD   mupirocin (BACTROBAN) 2 % ointment Apply topically 3 (three) times a day 8/8/22  Yes Jaycob Schumacher PA-C   polyethylene glycol (MIRALAX) 17 g packet Take 17 g by mouth daily as needed   Yes Historical Provider, MD   predniSONE 20 mg tablet Take 3 tablets daily for 4 days then 2 tablets for 4 days then 1 tablet for 4 days then 1/2 tablet for 4 days and stop 11/15/22  Yes Taryn Bo, DO   rivaroxaban (XARELTO) 15 mg tablet Take 1 tablet (15 mg total) by mouth daily with breakfast 4/13/19  Yes Lynn Montano, DO   simvastatin (ZOCOR) 20 mg tablet TAKE 1 TABLET DAILY AT     BEDTIME 6/21/22  Yes Mary Butler, DO   spironolactone (ALDACTONE) 25 mg tablet Take 25 mg by mouth daily   Yes Historical Provider, MD   furosemide (LASIX) 80 mg tablet  9/29/22   Historical Provider, MD     I have reviewed home medications using recent Epic encounter      Allergies: No Known Allergies    Social History:  Marital Status: /Civil Union   Occupation: none  Patient Pre-hospital Living Situation: With spouse  Patient Pre-hospital Level of Mobility: walks  Patient Pre-hospital Diet Restrictions: none  Substance Use History:   Social History     Substance and Sexual Activity   Alcohol Use Never     Social History     Tobacco Use   Smoking Status Every Day   • Packs/day: 0 25   • Years: 50 00   • Pack years: 12 50   • Types: Cigarettes   • Start date: 1/1/2022   Smokeless Tobacco Former   Tobacco Comments    quit 02/14/2021     Social History     Substance and Sexual Activity   Drug Use No       Family History:  Family History   Problem Relation Age of Onset   • Cancer Mother 80 Cancer when 80 yrs old   • Heart disease Mother    • Hypertension Mother    • Heart disease Father    • Cancer Father         Heart   46   • Diabetes Neg Hx    • Stroke Neg Hx        Physical Exam:     Vitals:   Blood Pressure: 130/59 (22)  Pulse: 70 (22)  Temperature: 97 5 °F (36 4 °C) (22 1201)  Temp Source: Oral (22)  Respirations: 21 (22)  Height: 6' 2" (188 cm) (22)  Weight - Scale: 122 kg (268 lb 12 8 oz) (22)  SpO2: 94 % (22)    Physical Exam  Vitals reviewed  Constitutional:       General: He is not in acute distress  Appearance: He is not ill-appearing  HENT:      Head: Normocephalic and atraumatic  Eyes:      General:         Right eye: No discharge  Left eye: No discharge  Cardiovascular:      Rate and Rhythm: Normal rate and regular rhythm  Pulmonary:      Effort: Pulmonary effort is normal  No respiratory distress  Breath sounds: Rales present  No wheezing  Comments: Decreased breath sounds bilaterally  Abdominal:      General: Bowel sounds are normal  There is no distension  Palpations: Abdomen is soft  Tenderness: There is no abdominal tenderness  Genitourinary:     Rectum: Guaiac result positive (Per ER doctor)  Musculoskeletal:      Right lower leg: Edema present  Left lower leg: Edema present  Skin:     Comments: Scattered areas of ecchymosis noted   Neurological:      Mental Status: He is alert and oriented to person, place, and time           Additional Data:     Lab Results:  Results from last 7 days   Lab Units 22  1327   WBC Thousand/uL 11 96*   HEMOGLOBIN g/dL 7 1*   HEMATOCRIT % 23 8*   PLATELETS Thousands/uL 236   NEUTROS PCT % 88*   LYMPHS PCT % 5*   MONOS PCT % 6   EOS PCT % 0     Results from last 7 days   Lab Units 22  1327   SODIUM mmol/L 131*   POTASSIUM mmol/L 4 4   CHLORIDE mmol/L 98   CO2 mmol/L 28   BUN mg/dL 48*   CREATININE mg/dL 2 00*   ANION GAP mmol/L 5   CALCIUM mg/dL 9 1   ALBUMIN g/dL 3 0*   TOTAL BILIRUBIN mg/dL 1 07*   ALK PHOS U/L 81   ALT U/L 25   GLUCOSE RANDOM mg/dL 128     Results from last 7 days   Lab Units 11/17/22  1327   INR  1 93*                   Lines/Drains:  Invasive Devices     Peripheral Intravenous Line  Duration           Peripheral IV 11/17/22 Right Antecubital <1 day                    Imaging: Reviewed radiology reports from this admission including: chest xray  XR chest 1 view portable   Final Result by Ju Lee MD (11/17 4676)      Cardiomegaly with moderate congestive changes  Workstation performed: [id number]             EKG and Other Studies Reviewed on Admission:   · EKG: EKG shows paced rhythm  ** Please Note: This note has been constructed using a voice recognition system   **

## 2022-11-18 NOTE — ANESTHESIA POSTPROCEDURE EVALUATION
Post-Op Assessment Note    CV Status:  Stable  Pain Score: 0    Pain management: adequate     Mental Status:  Sleepy   Hydration Status:  Stable   PONV Controlled:  None   Airway Patency:  Patent      Post Op Vitals Reviewed: Yes      Staff: Anesthesiologist         No notable events documented      BP      Temp     Pulse     Resp      SpO2

## 2022-11-18 NOTE — DISCHARGE INSTR - OTHER ORDERS
Plan:   Skin care Plan:    1-Cleanse sacro-buttocks with soap and water  Apply Allevyn foam  Jean Marie with T for treatment  Change every two days and as needed  May use barrier cream instead - apply 2x/day & as needed  2-Cleanse wound to right knee with wound cleanser & pat dry  Paint wound with betadine swab & cover with small Allevyn foam  Change every other day & as needed for soilage/dislodgement  3-Cleanse skin tear to right lower arm with wound cleanser & pat dry  Open small Dermagran square & apply to wound in a single layer cut to size of wound  Cover with ABD pad, kia & tape  Change every other day & as needed for soilage/dislodgement  4-Turn/reposition every 2 hrs in bed for pressure re-distribution on skin   5-Float heels to offload pressure on 2 pillows so heels not in contact with pillows or mattress  6-Moisturize skin daily with skin nourishing cream   7-Pressure redistribution cushion in chair when out of bed  8-Hydraguard barrier cream or equivalent to bilateral heels 2x/day and as needed

## 2022-11-18 NOTE — PROGRESS NOTES
Ricarda U  66   Progress Note [place] 1940, 80 y o  male MRN: [id number]  Unit/Bed#: [address] [place] [address] Encounter: [id number]  Primary Care Provider: Juli Chairez DO   Date and time admitted to hospital: 11/17/2022 12:08 PM    * Symptomatic anemia  Assessment & Plan  Patient presented to the ER with shortness of breath and outpatient abnormal lab work done by patient's cardiologist   reports 2-3 "chocolate colored" BMs  · On admission, hemoglobin 7 1  Stool occult positive per ER physician  · Patient was on iron previously which he stopped taking few months ago which was causing dark/black colored stools  · Hemoglobin 7 2 after receiving 1 unit PRBC transfusion  · iron panel reviewed - start IV Venofer  · As per EGD performed today, no signs of upper GI bleeding present  Signs of mild gastritis but awaiting results of biopsy  Signs of Candida esophagitis  Begin Diflucan  · IV Protonix b i d   · Continue to monitor with repeat lab work in a m  Results from last 7 days   Lab Units 11/18/22  0519 11/17/22  1327   HEMOGLOBIN g/dL 7 2* 7 1*   HEMATOCRIT % 23 6* 23 8*   MCV fL 86 86       Acute on chronic diastolic heart failure (HCC)  Assessment & Plan  Wt Readings from Last 3 Encounters:   11/18/22 121 kg (267 lb)   10/20/22 120 kg (265 lb)   10/05/22 119 kg (262 lb)     Patient reports worsening leg swelling compared to baseline along with shortness of breath  ProBNP noted to be elevated with chest x-ray showing moderate congestive changes  · Received 1 dose of 60 mg of IV Lasix on admission post blood transfusion  · Continue 40mg of IV Lasix bid     · Monitor I/O and daily weights        Elevated troponin  Assessment & Plan  Initial troponin 226 --> 230 --> 181  Likely non MI troponin elevation in the setting of anemia, CHF  Appreciate Cardiology input    Moderate to severe aortic stenosis  Assessment & Plan  Outpatient evaluation for TAVR    COPD (chronic obstructive pulmonary disease) ([facility] [address] )  Assessment & Plan  Per outpatient pulmonary note, patient was started on prednisone taper due to shortness of breath  · Held off on prednisone in the setting of GI bleed  · Shortness of breath is most likely due to anemia, CHF instead  · Continue with fluticasone/vilanterol  · Continue DuoNeb q 6 hours  Obesity (BMI 30-39  9)  Assessment & Plan  Body mass index is 34 28 kg/m²  Continue with dietary/lifestyle modifications    CAD (coronary artery disease)  Assessment & Plan  · Status post stent placement  · Continue Imdur 30mg and Pravachol 40mg  · Continue to hold Plavix    Stage 3a chronic kidney disease [facility])  Assessment & Plan  Lab Results   Component Value Date    EGFR 33 11/18/2022    EGFR 30 11/17/2022    EGFR 40 09/28/2022    CREATININE 1 83 (H) 11/18/2022    CREATININE 2 00 (H) 11/17/2022    CREATININE 1 58 (H) 09/28/2022     CKD stage 3 with baseline creatinine 1 3-1 5  Follows up with [facility] as outpatient  · On admission, creatinine of 2, which is likely cardiorenal in nature  · Creatinine has decreased from yesterday  · Continue to monitor with repeat blood work in am        VTE Pharmacologic Prophylaxis: VTE Score: 4 High Risk (Score >/= 5) - Pharmacological DVT Prophylaxis Contraindicated  Sequential Compression Devices Ordered  Patient Centered Rounds: I performed bedside rounds with nursing staff today  Discussions with Specialists or Other Care Team Provider: yes - GI, cardiology    Education and Discussions with Family / Patient: Updated  (wife) via phone  Time Spent for Care: 35 mins  More than 50% of total time spent on counseling and coordination of care as described above      Current Length of Stay: 1 day(s)  Current Patient Status: Inpatient   Certification Statement: The patient will continue to require additional inpatient hospital stay due to Symptomatic anemia, CHF exacerbation  Discharge Plan: Anticipate discharge in 48-72 hrs to home  Code Status: Level 1 - Full Code    Subjective:   Patient still reports shortness of breath  Reports having one bowel movement this morning that was "brown-black" in color  Denies chest pain, headaches, dizziness, nausea, vomiting, and abdominal pain  Objective:     Vitals:   Temp (24hrs), Av °F (36 1 °C), Min:96 2 °F (35 7 °C), Max:97 4 °F (36 3 °C)    Temp:  [96 2 °F (35 7 °C)-97 4 °F (36 3 °C)] 97 °F (36 1 °C)  HR:  [65-77] 66  Resp:  [18-21] 20  BP: (114-130)/(51-88) 126/64  SpO2:  [89 %-100 %] 99 %  Body mass index is 34 28 kg/m²  Input and Output Summary (last 24 hours): Intake/Output Summary (Last 24 hours) at 2022 1530  Last data filed at 2022 1452  Gross per 24 hour   Intake 1050 ml   Output 2450 ml   Net -1400 ml       Physical Exam:   Physical Exam  Vitals reviewed  Constitutional:       General: He is not in acute distress  HENT:      Head: Normocephalic and atraumatic  Eyes:      General: No scleral icterus  Cardiovascular:      Rate and Rhythm: Normal rate and regular rhythm  Heart sounds: Murmur heard  Pulmonary:      Effort: Pulmonary effort is normal  No respiratory distress  Breath sounds: No wheezing or rales  Comments: Improved air entry bilaterally  Abdominal:      General: Bowel sounds are normal  There is no distension  Palpations: Abdomen is soft  Tenderness: There is no abdominal tenderness  Musculoskeletal:      Right lower leg: Edema present  Left lower leg: Edema present  Comments: Trace bilateral LE edema   Skin:     Comments: Multiple areas of ecchymosis noted   Neurological:      Mental Status: He is alert and oriented to person, place, and time          Additional Data:     Labs:  Results from last 7 days   Lab Units 22  0519 22  1327   WBC Thousand/uL 10 28* 11 96*   HEMOGLOBIN g/dL 7 2* 7 1*   HEMATOCRIT % 23 6* 23 8*   PLATELETS Thousands/uL 162 236   NEUTROS PCT %  --  88* LYMPHS PCT %  --  5*   MONOS PCT %  --  6   EOS PCT %  --  0     Results from last 7 days   Lab Units 11/18/22  0519 11/17/22  1327   SODIUM mmol/L 135 131*   POTASSIUM mmol/L 5 2 4 4   CHLORIDE mmol/L 101 98   CO2 mmol/L 24 28   BUN mg/dL 52* 48*   CREATININE mg/dL 1 83* 2 00*   ANION GAP mmol/L 10 5   CALCIUM mg/dL 8 7 9 1   ALBUMIN g/dL  --  3 0*   TOTAL BILIRUBIN mg/dL  --  1 07*   ALK PHOS U/L  --  81   ALT U/L  --  25   GLUCOSE RANDOM mg/dL 107 128     Results from last 7 days   Lab Units 11/17/22  1327   INR  1 93*                   Lines/Drains:  Invasive Devices     Peripheral Intravenous Line  Duration           Peripheral IV 11/17/22 Right Antecubital 1 day                Imaging: Reviewed radiology reports from this admission including: chest xray    Recent Cultures (last 7 days):         Last 24 Hours Medication List:   Current Facility-Administered Medications   Medication Dose Route Frequency Provider Last Rate   • allopurinol  200 mg Oral Daily Lynn Montano, DO     • DULoxetine  60 mg Oral BID Lynn Montano, DO     • finasteride  5 mg Oral Daily Lynn Velezar, DO     • Fluticasone Furoate-Vilanterol  1 puff Inhalation Daily Lynn Montano, DO     • furosemide  40 mg Intravenous BID (diuretic) Lynn Montano, DO     • gabapentin  100 mg Oral HS Lynn Montano, DO     • ipratropium-albuterol  3 mL Nebulization Q6H Lynn Montano, DO     • isosorbide mononitrate  30 mg Oral Daily Lynn Montano, DO     • nicotine  1 patch Transdermal Daily Lynn Montano, DO     • pantoprazole  40 mg Intravenous Q12H [facility] Lynn Velezar, DO     • polyethylene glycol  17 g Oral Daily NAVA Hogan     • pravastatin  40 mg Oral Daily With Morgan-Santosh DO Dusty          Today, Patient Was Seen By: Wendy Pmientel DO    **Please Note: This note may have been constructed using a voice recognition system  **

## 2022-11-18 NOTE — ASSESSMENT & PLAN NOTE
Wt Readings from Last 3 Encounters:   11/17/22 122 kg (268 lb 12 8 oz)   10/20/22 120 kg (265 lb)   10/05/22 119 kg (262 lb)     Patient reports worsening leg swelling compared to baseline along with shortness of breath    ProBNP noted to be elevated with chest x-ray showing moderate congestive changes  · Will give 1 dose of 60 mg of IV Lasix today post blood transfusion  · Place patient on 40 mg of IV Lasix b i d   · I/O, daily weights

## 2022-11-18 NOTE — PLAN OF CARE
Problem: MOBILITY - ADULT  Goal: Maintain or return to baseline ADL function  Description: INTERVENTIONS:  -  Assess patient's ability to carry out ADLs; assess patient's baseline for ADL function and identify physical deficits which impact ability to perform ADLs (bathing, care of mouth/teeth, toileting, grooming, dressing, etc )  - Assess/evaluate cause of self-care deficits   - Assess range of motion  - Assess patient's mobility; develop plan if impaired  - Assess patient's need for assistive devices and provide as appropriate  - Encourage maximum independence but intervene and supervise when necessary  - Involve family in performance of ADLs  - Assess for home care needs following discharge   - Consider OT consult to assist with ADL evaluation and planning for discharge  - Provide patient education as appropriate  Outcome: Progressing  Goal: Maintains/Returns to pre admission functional level  Description: INTERVENTIONS:  - Perform BMAT or MOVE assessment daily    - Set and communicate daily mobility goal to care team and patient/family/caregiver  - Collaborate with rehabilitation services on mobility goals if consulted  - Perform Range of Motion 2 times a day  - Reposition patient every 2 hours    - Dangle patient 2 times a day  - Stand patient 2 times a day  - Ambulate patient 2 times a day  - Out of bed to chair 3 times a day   - Out of bed for meals 3 times a day  - Out of bed for toileting  - Record patient progress and toleration of activity level   Outcome: Progressing     Problem: Prexisting or High Potential for Compromised Skin Integrity  Goal: Skin integrity is maintained or improved  Description: INTERVENTIONS:  - Identify patients at risk for skin breakdown  - Assess and monitor skin integrity  - Assess and monitor nutrition and hydration status  - Monitor labs   - Assess for incontinence   - Turn and reposition patient  - Assist with mobility/ambulation  - Relieve pressure over bony prominences  - Avoid friction and shearing  - Provide appropriate hygiene as needed including keeping skin clean and dry  - Evaluate need for skin moisturizer/barrier cream  - Collaborate with interdisciplinary team   - Patient/family teaching  - Consider wound care consult   Outcome: Progressing     Problem: Potential for Falls  Goal: Patient will remain free of falls  Description: INTERVENTIONS:  - Educate patient/family on patient safety including physical limitations  - Instruct patient to call for assistance with activity   - Consult OT/PT to assist with strengthening/mobility   - Keep Call bell within reach  - Keep bed low and locked with side rails adjusted as appropriate  - Keep care items and personal belongings within reach  - Initiate and maintain comfort rounds  - Make Fall Risk Sign visible to staff  - Offer Toileting every 2 Hours, in advance of need  - Initiate/Maintain bed alarm  - Obtain necessary fall risk management equipment:   - Apply yellow socks and bracelet for high fall risk patients  - Consider moving patient to room near nurses station  Outcome: Progressing

## 2022-11-18 NOTE — PLAN OF CARE
Problem: MOBILITY - ADULT  Goal: Maintain or return to baseline ADL function  Description: INTERVENTIONS:  -  Assess patient's ability to carry out ADLs; assess patient's baseline for ADL function and identify physical deficits which impact ability to perform ADLs (bathing, care of mouth/teeth, toileting, grooming, dressing, etc )  - Assess/evaluate cause of self-care deficits   - Assess range of motion  - Assess patient's mobility; develop plan if impaired  - Assess patient's need for assistive devices and provide as appropriate  - Encourage maximum independence but intervene and supervise when necessary  - Involve family in performance of ADLs  - Assess for home care needs following discharge   - Consider OT consult to assist with ADL evaluation and planning for discharge  - Provide patient education as appropriate  Outcome: Progressing  Goal: Maintains/Returns to pre admission functional level  Description: INTERVENTIONS:  - Perform BMAT or MOVE assessment daily    - Set and communicate daily mobility goal to care team and patient/family/caregiver  - Collaborate with rehabilitation services on mobility goals if consulted  - Perform Range of Motion 2 times a day  - Reposition patient every 2 hours    - Dangle patient 2 times a day  - Stand patient 2 times a day  - Ambulate patient 2 times a day  - Out of bed to chair 3 times a day   - Out of bed for meals 3 times a day  - Out of bed for toileting  - Record patient progress and toleration of activity level   Outcome: Progressing     Problem: Prexisting or High Potential for Compromised Skin Integrity  Goal: Skin integrity is maintained or improved  Description: INTERVENTIONS:  - Identify patients at risk for skin breakdown  - Assess and monitor skin integrity  - Assess and monitor nutrition and hydration status  - Monitor labs   - Assess for incontinence   - Turn and reposition patient  - Assist with mobility/ambulation  - Relieve pressure over bony prominences  - Avoid friction and shearing  - Provide appropriate hygiene as needed including keeping skin clean and dry  - Evaluate need for skin moisturizer/barrier cream  - Collaborate with interdisciplinary team   - Patient/family teaching  - Consider wound care consult   Outcome: Progressing     Problem: Potential for Falls  Goal: Patient will remain free of falls  Description: INTERVENTIONS:  - Educate patient/family on patient safety including physical limitations  - Instruct patient to call for assistance with activity   - Consult OT/PT to assist with strengthening/mobility   - Keep Call bell within reach  - Keep bed low and locked with side rails adjusted as appropriate  - Keep care items and personal belongings within reach  - Initiate and maintain comfort rounds  - Make Fall Risk Sign visible to staff  - Offer Toileting every 2 Hours, in advance of need  - Initiate/Maintain bed alarm  - Obtain necessary fall risk management equipment:   - Apply yellow socks and bracelet for high fall risk patients  - Consider moving patient to room near nurses station  Outcome: Progressing     Problem: RESPIRATORY - ADULT  Goal: Achieves optimal ventilation and oxygenation  Description: INTERVENTIONS:  - Assess for changes in respiratory status  - Assess for changes in mentation and behavior  - Position to facilitate oxygenation and minimize respiratory effort  - Oxygen administered by appropriate delivery if ordered  - Initiate smoking cessation education as indicated  - Encourage broncho-pulmonary hygiene including cough, deep breathe, Incentive Spirometry  - Assess the need for suctioning and aspirate as needed  - Assess and instruct to report SOB or any respiratory difficulty  - Respiratory Therapy support as indicated  Outcome: Progressing

## 2022-11-18 NOTE — ASSESSMENT & PLAN NOTE
Initial troponin 226 --> 230   Trend once more  Likely non MI troponin elevation in the setting of anemia, CHF  Cardiology consulted by ER

## 2022-11-18 NOTE — ASSESSMENT & PLAN NOTE
Patient presented to the ER with shortness of breath and outpatient abnormal lab work done by patient's cardiologist   reports 2-3 "chocolate colored" BMs  · Hemoglobin 7 1  Stool occult positive per ER physician  · Patient was on iron previously which he stopped taking few months ago which was causing dark/black colored stools  · 1 unit PRBC transfusion ordered  Check iron panel  · Plan of care was also coordinated with GI and tentative plan for EGD tomorrow if respiratory status stable  · IV Protonix b i d  · Clear liquid diet and NPO after midnight  · Repeat lab work in a m      Results from last 7 days   Lab Units 11/17/22  1327   HEMOGLOBIN g/dL 7 1*   HEMATOCRIT % 23 8*   MCV fL 86

## 2022-11-18 NOTE — ASSESSMENT & PLAN NOTE
Per outpatient pulmonary note, patient was started on prednisone taper due to shortness of breath  · Held off on prednisone in the setting of GI bleed  · Shortness of breath is most likely due to anemia, CHF instead  · Continue with fluticasone/vilanterol  · Continue DuoNeb q 6 hours

## 2022-11-18 NOTE — ANESTHESIA PREPROCEDURE EVALUATION
Procedure:  EGD    Relevant Problems   CARDIO  EF 65%   (+) Aneurysm of abdominal aorta   (+) CAD (coronary artery disease)   (+) Chronic a-fib (HCC)   (+) Coronary artery arteriosclerosis   (+) Dyspnea on exertion   (+) Essential hypertension   (+) Hyperlipidemia   (+) Nonrheumatic aortic (valve) stenosis (severe, valvue area 0 9)   (+) Pacemaker   (+) Peripheral arteriosclerosis (HCC)      ENDO   (+) Diabetes mellitus, type 2 (HCC)      GI/HEPATIC   (+) Dysphagia, pharyngoesophageal phase   (+) GI bleeding   (+) Gastro-esophageal reflux disease without esophagitis      /RENAL   (+) Benign hypertension with chronic kidney disease, stage III (HCC)   (+) Benign prostatic hyperplasia   (+) Kidney stone on left side   (+) Nephrolithiasis   (+) Stage 3a chronic kidney disease (HCC)      HEMATOLOGY   (+) Iron deficiency anemia   (+) Symptomatic anemia      MUSCULOSKELETAL  right IM nail    (+) Cervical strain   (+) Chronic gout without tophus   (+) Fibromyalgia   (+) Primary osteoarthritis of both knees      NEURO/PSYCH   (+) Chronic pain disorder   (+) Chronic pain syndrome   (+) Depression   (+) Fibromyalgia   (+) JOO (generalized anxiety disorder)   (+) History of falling   (+) Major depressive disorder, recurrent, unspecified ([facility])   (+) Moderate or severe vision impairment, one eye      PULMONARY   (+) COPD (chronic obstructive pulmonary disease) ([facility])   (+) Centrilobular emphysema ([facility])   (+) Dyspnea on exertion   (+) Mixed simple and mucopurulent chronic bronchitis ([facility])   (+) JOVI (obstructive sleep apnea)   (+) Smoker      Other   (+) Obesity (BMI 30-39 9)   (+) Status post primary angioplasty with coronary stent        Physical Exam    Airway    Mallampati score: II  TM Distance: >3 FB  Neck ROM: full     Dental       Cardiovascular  Rhythm: regular, Rate: normal,     Pulmonary  Breath sounds clear to auscultation,     Other Findings        Anesthesia Plan  ASA Score- 3 Emergent    Anesthesia Type- IV sedation with anesthesia with ASA Monitors  Additional Monitors:   Airway Plan:           Plan Factors-    Chart reviewed  Patient is a current smoker  Patient instructed to abstain from smoking on day of procedure  Patient did not smoke on day of surgery  Induction- intravenous  Postoperative Plan-     Informed Consent- Anesthetic plan and risks discussed with patient  I personally reviewed this patient with the CRNA  Discussed and agreed on the Anesthesia Plan with the CRNA  Yessi Bo

## 2022-11-18 NOTE — ANESTHESIA POSTPROCEDURE EVALUATION
Post-Op Assessment Note    CV Status:  Stable  Pain Score: 0    Pain management: adequate     Mental Status:  Alert, awake and sleepy   Hydration Status:  Euvolemic   PONV Controlled:  Controlled   Airway Patency:  Patent      Post Op Vitals Reviewed: Yes      Staff: CRNA, Anesthesiologist   Comments: Report given to recovering RN, VSS, Pt resting comfortably        No notable events documented      /57 (11/18/22 1454)    Temp     Pulse 65 (11/18/22 1454)   Resp 20 (11/18/22 1454)    SpO2 100 % (11/18/22 1454)

## 2022-11-18 NOTE — ASSESSMENT & PLAN NOTE
Lab Results   Component Value Date    EGFR 30 11/17/2022    EGFR 40 09/28/2022    EGFR 52 07/20/2022    CREATININE 2 00 (H) 11/17/2022    CREATININE 1 58 (H) 09/28/2022    CREATININE 1 35 07/20/2022     CKD stage 3 with baseline creatinine 1 3-1 5  Follows up with [address] [place] [address] Nephrology as outpatient  · Creatinine of 2, likely cardiorenal in nature  · Monitor creatinine while on diuretics  · Repeat lab work in a m

## 2022-11-18 NOTE — WOUND OSTOMY CARE
Progress Note - Wound   Deepali Section 80 y o  male MRN: [id number]  Unit/Bed#: [address] [place] [address] Encounter: [id number]      Assessment: This is an 80year old male patient admitted on 11/17/22 with symptomatic anemia  He has a history of COPD, obesity, CAD and CKD 3  He was awake, alert & oriented x 3 and was repositioned in bed with assist of 1  He is totally dependent upon nursing staff for all of his care and is continent of urine and stool  Assessment Findings:  1-Stage 1 pressure injury to left sacral area, present on admission with non-blanchable erythema noted  Orders in place for wound care and for prevention  2-Full thickness traumatic wound to right knee with black eschar 99% and pink granulation tissue 1%  Orders in place for wound care  Plan:   Skin care Plan:  1-Cleanse sacro-buttocks with soap and water  Apply Allevyn foam  Jean Marie with T for treatment  Change every two days and PRN  check skin q-shift  2-Cleanse wound to right knee with NSS & pat dry  Paint wound with betadine swab & cover with small Allevyn foam  Change every other day & prn soilage/dislodgement  3-Cleanse skin tear to right lower arm with NSS & pat dry  Open small Dermagran square & apply to wound in a single layer cut to size of wound  Cover with ABD pad, kia & tape  Change every other day & prn soilage/dislodgement  4-Turn/reposition q2h or when medically stable for pressure re-distribution on skin   5-Float heels to offload pressure on 2 pillows so heels not in contact with pillows or mattress  6-Moisturize skin daily with skin nourishing cream   7-Ehob cushion in chair when out of bed  8-Hydraguard to bilateral heels BID and PRN        Wound 01/29/22 Pressure Injury Sacrum (Active)   Wound Image   11/18/22 1650   Wound Description Non-blanchable erythema 11/18/22 1650   Pressure Injury Stage Stage 1 11/18/22 1650   Iwona-wound Assessment Excoriated 11/18/22 1650   Wound Length (cm) 6 cm 11/18/22 1650   Wound Width (cm) 4 cm 11/18/22 1650   Wound Depth (cm) 0 cm 11/18/22 1650   Wound Surface Area (cm^2) 24 cm^2 11/18/22 1650   Wound Volume (cm^3) 0 cm^3 11/18/22 1650   Calculated Wound Volume (cm^3) 0 cm^3 11/18/22 1650   Drainage Amount None 11/18/22 1650   Treatments Cleansed 11/18/22 1650   Dressing Foam, Silicon (eg  Allevyn, etc) 11/18/22 1650   Dressing Changed Changed 11/18/22 1650   Dressing Status Clean;Dry; Intact 11/18/22 1650       Wound 11/17/22 Skin Tear Skin tear Arm Anterior;Left;Proximal;Lower (Active)   Wound Image  Healed 11/18/22 1649   Wound Description Epithelialized 11/18/22 1650   Drainage Amount None 11/18/22 1650   Treatments Cleansed 11/18/22 1650   Dressing Moisturizer 11/18/22 1650   Dressing Changed New 11/18/22 1650   Dressing Status Intact 11/18/22 1650       Wound 11/17/22 Abrasion(s) Pretibial Left (Active)   Wound Image  Healed 11/18/22 1649   Wound Description Epithelialization 11/18/22 1649   Wound Length (cm) 0 cm 11/18/22 1649   Wound Width (cm) 0 cm 11/18/22 1649   Wound Depth (cm) 0 cm 11/18/22 1649   Wound Surface Area (cm^2) 0 cm^2 11/18/22 1649   Wound Volume (cm^3) 0 cm^3 11/18/22 1649   Calculated Wound Volume (cm^3) 0 cm^3 11/18/22 1649   Drainage Amount None 11/18/22 1649   Treatments Cleansed 11/18/22 1649   Dressing Protective barrier (Hydraguard) 11/18/22 1649   Dressing Status Intact 11/18/22 1649       Wound 11/18/22 Knee Anterior;Right (Active)   Wound Image   11/18/22 1648   Wound Description Granulation tissue;Pink;Eschar;Black;Dry 11/18/22 1648   Iwona-wound Assessment Dry; Intact 11/18/22 1648   Wound Length (cm) 2 2 cm 11/18/22 1648   Wound Width (cm) 1 5 cm 11/18/22 1648   Wound Depth (cm) 0 1 cm 11/18/22 1648   Wound Surface Area (cm^2) 3 3 cm^2 11/18/22 1648   Wound Volume (cm^3) 0 33 cm^3 11/18/22 1648   Calculated Wound Volume (cm^3) 0 33 cm^3 11/18/22 1648   Drainage Amount None 11/18/22 1648   Non-staged Wound Description Full thickness 11/18/22 1648   Treatments Cleansed 11/18/22 1648   Dressing Foam, Silicon (eg  Allevyn, etc); Betadine swab 11/18/22 1648   Wound packed? No 11/18/22 1648   Dressing Changed New 11/18/22 1648   Dressing Status Clean;Dry; Intact 11/18/22 1648     Discussed assessment findings, and plan of care/recommendations with Mel Flynn  Wound care will follow along with patient throughout admission, please call or tiger text with questions and concerns  Recommendations written as orders    Mi Dukes RN, BSN, [employer]

## 2022-11-18 NOTE — ASSESSMENT & PLAN NOTE
Lab Results   Component Value Date    EGFR 33 11/18/2022    EGFR 30 11/17/2022    EGFR 40 09/28/2022    CREATININE 1 83 (H) 11/18/2022    CREATININE 2 00 (H) 11/17/2022    CREATININE 1 58 (H) 09/28/2022     CKD stage 3 with baseline creatinine 1 3-1 5  Follows up with [facility] as outpatient  · On admission, creatinine of 2, which is likely cardiorenal in nature  · Creatinine has decreased from yesterday     · Continue to monitor with repeat blood work in am

## 2022-11-18 NOTE — CONSULTS
CARDIOLOGY CONSULTATION  Capo Dawson 80 y o  male MRN: [id number]  Unit/Bed#: [address] [place] [address] [facility] Encounter: [id number]      History of Present Illness   PCP: Adalberto Ling DO  Date of Admission:  11/17/2022  Date of Consultation: 11/18/22  Physician Requesting Consult: Isabella Chaney DO    Reason for Consult / Principal Problem:  Dyspnea    Assessment/Plan   Acute on chronic shortness of breath likely multifactorial including symptomatic anemia  Stool occult positive  Patient given 1 unit PRBC  Placed on IV Protonix  Scheduled for a EGD by GI  Acute acute on chronic diastolic heart failure in the setting of severe aortic stenosis- patient has had a 6-8 lb weight gain  [place] He reports increased lower extremity swelling  He was given IV Lasix with improved volume status  He has been placed on 40 mg of IV Lasix b i d  His anemia may be multifactorial including secondary to severe aortic stenosis  Recommend outpatient evaluation for TAVR candidacy  Chronic atrial fibrillation/sick sinus syndrome? Status post dual-chamber pacemaker    Non MI troponin elevation in the setting of anemia, decompensated heart failure, elevated kidney dysfunction  Patient denies having active chest pain  Moderate to severe aortic stenosis- consideration for TAVR evaluation as an outpatient    Coronary artery disease with remote history of stenting currently on Imdur and pravastatin  His antiplatelets are being held  BMI 35    Chronic obstructive pulmonary disease      HPI: Capo Dawson is a 80y o  year old male who presents with worsening shortness of breath and increased weight gain  He was sent by his outpatient cardiologist secondary to abnormal for he was recently prescribed a prednisone taper by his pulmonologist   He reports having discolored stools  He denies having bright red blood per rectum  He denies having syncope  He has had increased lower extremity swelling      Historical Information   Past Medical History:   Diagnosis Date   • Arthritis    • Atrial flutter (HCC)    • Chronic kidney disease     stage 3   • Coronary artery disease     2 stents   • Fluid retention    • Gout    • Heart disease    • Heart failure (HCC)     pacemaker   • Hypertension    • Hyponatremia 04/08/2019   • Neurological disorder    • Pacemaker    • Pulmonary emphysema ([facility] [address] )    • Radiculopathy     last assessed 1/28/16    • Shortness of breath     exertion   • Sleep apnea     c pap     Past Surgical History:   Procedure Laterality Date   • ANGIOPLASTY      x2 2 stents and then replaced   • CARDIAC PACEMAKER PLACEMENT      pacemaker permanent placement dual chamber / last assessed 4/7/14 / implantation    • CARDIAC SURGERY      pacemaker   • CHOLECYSTECTOMY     • CORONARY ANGIOPLASTY WITH STENT PLACEMENT     • EPIDURAL BLOCK INJECTION N/A 05/26/2016    Procedure: BLOCK / INJECTION EPIDURAL STEROID LUMBAR  L4-5;  Surgeon: Jaya Juarez MD;  Location: [facility] MAIN OR;  Service:    • EPIDURAL BLOCK INJECTION N/A 02/14/2019    Procedure: L4 L5 Lumbar Epidural Steroid Injection;  Surgeon: Jaya Juarez MD;  Location: [facility] MAIN OR;  Service: Pain Management    • EYE SURGERY      cataract left   • HAND SURGERY     • HIP PINNING Left    • INSERT / REPLACE / REMOVE PACEMAKER     • KNEE ARTHROSCOPY W/ MENISCAL REPAIR Left    • KNEE SURGERY     • LUMBAR EPIDURAL INJECTION N/A 03/17/2016    Procedure: BLOCK / INJECTION LUMBAR  L4-5  (C-ARM); Surgeon: Jaya Juarez MD;  Location: [facility] MAIN OR;  Service:    • DC OPEN RX FEMUR FX+INTRAMED ELLA Right 01/31/2022    Procedure: INSERTION NAIL IM FEMUR ANTEGRADE (TROCHANTERIC);   Surgeon: Xavier Shah MD;  Location: [facility] Main OR;  Service: Orthopedics     Social History     Substance and Sexual Activity   Alcohol Use Never     Social History     Substance and Sexual Activity   Drug Use No     Social History     Tobacco Use   Smoking Status Every Day   • Packs/day: 0 25   • Years: 50 00   • Pack years: 12 50   • Types: Cigarettes   • Start date: 2022   Smokeless Tobacco Former   Tobacco Comments    quit 2021     Family History   Problem Relation Age of Onset   • Cancer Mother 80        Cancer when 80 yrs old   • Heart disease Mother    • Hypertension Mother    • Heart disease Father    • Cancer Father         Heart   46   • Diabetes Neg Hx    • Stroke Neg Hx        Meds/Allergies   Prior to Admission medications    Medication Sig Start Date End Date Taking? Authorizing Provider   Albuterol Sulfate (albuterol, FOR EMS ONLY,) (2 5 mg/3 mL) 0 083 % nebulizer solution Take 2 5 mg by nebulization every 6 (six) hours as needed for wheezing   Yes Historical Provider, MD   allopurinol (ZYLOPRIM) 100 mg tablet Take 2 tablets (200 mg total) by mouth daily 1/15/22  Yes Dannielle Anaya MD   clopidogrel (Plavix) 75 mg tablet Take 1 tablet (75 mg total) by mouth daily 22  Yes Mary Butler, DO   Cranberry 1000 MG CAPS Take 1 tablet by mouth 2 (two) times a day  Yes Historical Provider, MD   Dapagliflozin Propanediol 5 MG TABS Take by mouth daily   Yes Historical Provider, MD   DULoxetine (CYMBALTA) 60 mg delayed release capsule TAKE 1 CAPSULE TWICE DAILY 22  Yes Gertie Boas, DPM   finasteride (PROSCAR) 5 mg tablet TAKE 1 TABLET DAILY 22  Yes Mary Butler,    fluticasone-umeclidinium-vilanterol (Trelegy Ellipta) 200-62 5-25 MCG/INH AEPB inhaler Inhale 1 puff daily Rinse mouth after use   21  Yes Leanne Connell PA-C   furosemide (LASIX) 20 mg tablet Take two tablet total 40 mg in a m  and one tablet 20 mg in p m  22  Yes Dannielle Anaya MD   gabapentin (NEURONTIN) 100 mg capsule Take 1 capsule (100 mg total) by mouth daily at bedtime 10/14/22 12/13/22 Yes Gertie Boas, DPM   isosorbide mononitrate (IMDUR) 30 mg 24 hr tablet  22  Yes Historical Provider, MD   mupirocin (BACTROBAN) 2 % ointment Apply topically 3 (three) times a day 22  Yes Jaycob Schumacher PA-C polyethylene glycol (MIRALAX) 17 g packet Take 17 g by mouth daily as needed   Yes Historical Provider, MD   predniSONE 20 mg tablet Take 3 tablets daily for 4 days then 2 tablets for 4 days then 1 tablet for 4 days then 1/2 tablet for 4 days and stop 11/15/22  Yes Krunal Kumari, DO   rivaroxaban (XARELTO) 15 mg tablet Take 1 tablet (15 mg total) by mouth daily with breakfast 4/13/19  Yes Esteban Spangler, DO   simvastatin (ZOCOR) 20 mg tablet TAKE 1 TABLET DAILY AT     BEDTIME 6/21/22  Yes Alex Ayala, DO   spironolactone (ALDACTONE) 25 mg tablet Take 25 mg by mouth daily   Yes Historical Provider, MD   furosemide (LASIX) 80 mg tablet  9/29/22   Historical Provider, MD     Current Facility-Administered Medications   Medication Dose Route Frequency Provider Last Rate Last Admin   • allopurinol (ZYLOPRIM) tablet 200 mg  200 mg Oral Daily Lynn Revankar, DO   200 mg at 11/18/22 0848   • DULoxetine (CYMBALTA) delayed release capsule 60 mg  60 mg Oral BID Lynn Revankar, DO   60 mg at 11/18/22 1731   • finasteride (PROSCAR) tablet 5 mg  5 mg Oral Daily Lynn Revankar, DO   5 mg at 11/18/22 0848   • Fluticasone Furoate-Vilanterol 100-25 mcg/actuation 1 puff  1 puff Inhalation Daily Lynn Revankar, DO   1 puff at 11/18/22 0849   • furosemide (LASIX) injection 40 mg  40 mg Intravenous BID (diuretic) Lynn Revankar, DO   40 mg at 11/18/22 1731   • gabapentin (NEURONTIN) capsule 100 mg  100 mg Oral HS Lynn Revankar, DO   100 mg at 11/17/22 2326   • ipratropium-albuterol (DUO-NEB) 0 5-2 5 mg/3 mL inhalation solution 3 mL  3 mL Nebulization Q6H Lynn Revankar, DO   3 mL at 11/18/22 1310   • iron sucrose (VENOFER) 300 mg in sodium chloride 0 9 % 250 mL IVPB  300 mg Intravenous Once Lynn Revankar, DO   300 mg at 11/18/22 1731   • isosorbide mononitrate (IMDUR) 24 hr tablet 30 mg  30 mg Oral Daily Lynn Revankar, DO   30 mg at 11/18/22 0848   • nicotine (NICODERM CQ) 7 mg/24hr TD 24 hr patch 1 patch  1 patch Transdermal Daily Lynn Revankar, DO       • pantoprazole (PROTONIX) injection 40 mg  40 mg Intravenous Q12H [address] Lynn Revankar, DO   40 mg at 11/18/22 0847   • polyethylene glycol (MIRALAX) packet 17 g  17 g Oral Daily Steve Staleyandrés NAVA   17 g at 11/18/22 0849   • pravastatin (PRAVACHOL) tablet 40 mg  40 mg Oral Daily With Morgan-Santosh Revankar, DO   40 mg at 11/18/22 1731     No Known Allergies    Review of systems  CONSTITUTIONAL:  As per hpi  HEENT:  Eyes:  No visual loss, blurred vision, double vision or yellow sclerae  Ears, Nose, Throat:  No hearing loss, sneezing, congestion, runny nose or sore throat  SKIN:  No rash or itching  CARDIOVASCULAR:  As per hpi  RESPIRATORY:  As per hpi  GASTROINTESTINAL:  No anorexia, nausea, vomiting or diarrhea  No abdominal pain or blood  GENITOURINARY:  Burning on urination  No flank pain  NEUROLOGICAL:  No headache, dizziness, syncope, paralysis, ataxia, numbness or tingling in the extremities  No change in bowel or bladder control  MUSCULOSKELETAL:  No muscle, back pain, joint pain or stiffness  HEMATOLOGIC:  No anemia, bleeding or bruising  LYMPHATICS:  No enlarged nodes  No history of splenectomy  PSYCHIATRIC:  No active suicidal or homicidal ideation  ENDOCRINOLOGIC:  No reports of sweating, cold or heat intolerance  No polyuria or polydipsia  ALLERGIES:  No history of asthma, hives, eczema or rhinitis  More than 10 systems reviewed and otherwise noncontributory  Objective   Vitals: Blood pressure 123/54, pulse 70, temperature (!) 97 °F (36 1 °C), temperature source Oral, resp  rate 20, height 6' 2" (1 88 m), weight 121 kg (267 lb), SpO2 95 %  Blood pressure 123/54, pulse 70, temperature (!) 97 °F (36 1 °C), temperature source Oral, resp  rate 20, height 6' 2" (1 88 m), weight 121 kg (267 lb), SpO2 95 %  Body mass index is 34 28 kg/m²    BP Readings from Last 3 Encounters:   11/18/22 123/54   10/20/22 130/70   10/05/22 114/64     Orthostatic Blood Pressures    Flowsheet Row Most Recent Value   Blood Pressure 123/54 filed at 11/18/2022 1738   Patient Position - Orthostatic VS Lying filed at 11/18/2022 0747          Intake/Output Summary (Last 24 hours) at 11/18/2022 1738  Last data filed at 11/18/2022 1452  Gross per 24 hour   Intake 1050 ml   Output 2450 ml   Net -1400 ml     Invasive Devices     Peripheral Intravenous Line  Duration           Peripheral IV 11/17/22 Right Antecubital 1 day                Physical Exam  Constitutional:       General: He is not in acute distress  Appearance: He is well-developed and well-nourished  He is ill-appearing  He is not diaphoretic  HENT:      Head: Normocephalic and atraumatic  Right Ear: External ear normal       Left Ear: External ear normal       Nose: Nose normal       Mouth/Throat:      Pharynx: No oropharyngeal exudate  Eyes:      General: No scleral icterus  Right eye: No discharge  Left eye: No discharge  Conjunctiva/sclera: Conjunctivae normal       Pupils: Pupils are equal, round, and reactive to light  Neck:      Thyroid: No thyromegaly  Vascular: No JVD  Trachea: No tracheal deviation  Cardiovascular:      Rate and Rhythm: Normal rate and regular rhythm  Pulses: Intact distal pulses  Heart sounds: Murmur heard  No friction rub  No gallop  Pulmonary:      Effort: No respiratory distress  Breath sounds: No stridor  No wheezing or rales  Chest:      Chest wall: No tenderness  Abdominal:      General: Bowel sounds are normal  There is distension  Palpations: Abdomen is soft  There is no mass  Tenderness: There is no abdominal tenderness  There is no guarding or rebound  Genitourinary:     Comments: No CVA tenderness  Musculoskeletal:         General: Deformity present  No tenderness or edema  Cervical back: Normal range of motion  Skin:     General: Skin is warm and dry  Coloration: Skin is pale  Findings: Bruising, erythema and lesion present  No rash  Neurological:      Mental Status: He is alert and oriented to person, place, and time  Motor: No abnormal muscle tone  Deep Tendon Reflexes: Reflexes normal    Psychiatric:         Mood and Affect: Mood and affect normal          Behavior: Behavior normal          Thought Content: Thought content normal          Judgment: Judgment normal          Lab Results:  I Have Reviewed All Lab Data Below:  Results from last 7 days   Lab Units 11/18/22  0519 11/17/22  1327   WBC Thousand/uL 10 28* 11 96*   HEMOGLOBIN g/dL 7 2* 7 1*   HEMATOCRIT % 23 6* 23 8*   PLATELETS Thousands/uL 162 236   NEUTROS PCT %  --  88*   LYMPHS PCT %  --  5*   MONOS PCT %  --  6   EOS PCT %  --  0     Results from last 7 days   Lab Units 11/18/22  0519 11/17/22  1327   POTASSIUM mmol/L 5 2 4 4   CHLORIDE mmol/L 101 98   CO2 mmol/L 24 28   BUN mg/dL 52* 48*   CREATININE mg/dL 1 83* 2 00*   CALCIUM mg/dL 8 7 9 1   ALK PHOS U/L  --  81   ALT U/L  --  25     Troponins:       CBC with diff:   Results from last 7 days   Lab Units 11/18/22  0519 11/17/22  1327   WBC Thousand/uL 10 28* 11 96*   HEMOGLOBIN g/dL 7 2* 7 1*   HEMATOCRIT % 23 6* 23 8*   MCV fL 86 86   PLATELETS Thousands/uL 162 236   MCH pg 26 2* 25 5*   MCHC g/dL 30 5* 29 8*   RDW % 19 1* 19 4*   MPV fL 10 9 8 9   NRBC AUTO /100 WBCs  --  0       CMP:   Results from last 7 days   Lab Units 11/18/22  0519 11/17/22  1327   SODIUM mmol/L 135 131*   POTASSIUM mmol/L 5 2 4 4   CHLORIDE mmol/L 101 98   CO2 mmol/L 24 28   ANION GAP mmol/L 10 5   BUN mg/dL 52* 48*   CREATININE mg/dL 1 83* 2 00*   CALCIUM mg/dL 8 7 9 1   ALT U/L  --  25   ALK PHOS U/L  --  81   TOTAL PROTEIN g/dL  --  7 9   ALBUMIN g/dL  --  3 0*   TOTAL BILIRUBIN mg/dL  --  1 07*   EGFR ml/min/1 73sq m 33 30   GLUCOSE RANDOM mg/dL 107 128     Magnesium:   Results from last 7 days   Lab Units 11/18/22  0519   MAGNESIUM mg/dL 2 6       NT-proBNP:   Recent Labs 22  1327   NTBNP 7,666*        Coags:   Results from last 7 days   Lab Units 22  1327   PTT seconds 33   INR  1 93*       Troponins:       Lipid Profile:                              Lab Results   Component Value Date    CHOLESTEROL 116 2021    HDL 47 2021    LDLCALC 58 2021    TRIG 44 2021       TSH:        Hgb A1c:       Imaging: I have personally reviewed pertinent films in PACS    Cardiac testing:   Results for orders placed during the hospital encounter of 20    Echo complete with contrast if indicated    Narrative  [address]  [address] [facility], [place] [address]  [phone]    Transthoracic Echocardiogram  2D, M-mode, Doppler, and Color Doppler    Study date:  14-Sep-2020    Patient: Shaila Banks  MR number: [id number]  Account number: [de-identified]  : 1940  Age: [de-identified] years  Gender: Male  Status: Inpatient  Location: Bedside  Height: 73 in  Weight: 298 3 lb  BP: 120/ 68 mmHg    Diagnoses: I48 0 - Atrial fibrillation    Sonographer:  ROGER Lamas  Primary Physician:  Alpa Valladares DO  Referring Physician:  Larissa Pineda MD  Group:  [facility]  Interpreting Physician:  Larissa Pineda MD    SUMMARY    LEFT VENTRICLE:  Systolic function was normal  Ejection fraction was estimated in the range of 55 % to 60 % to be 55 %  Although no diagnostic regional wall motion abnormality was identified, this possibility cannot be completely excluded on the basis of this study  Wall thickness was mildly to moderately increased  There was mild concentric hypertrophy  RIGHT VENTRICLE:  The ventricle was mildly dilated  LEFT ATRIUM:  The atrium was moderately to markedly dilated  RIGHT ATRIUM:  The atrium was mildly to moderately dilated  MITRAL VALVE:  There was moderate annular calcification  There was mild stenosis  Mean Gradient 6-7 mm of hg  There was mild regurgitation      AORTIC VALVE:  The valve was functionally bicuspid  Leaflets exhibited normal thickness and normal cuspal separation  Transaortic velocity was increased due to valvular stenosis  There was moderate to severe stenosis  Shima 1 0 cm2, peak gradient 65, mean 35 mm of hg    TRICUSPID VALVE:  There was mild to moderate regurgitation  Estimated peak PA pressure was 55 to 60 mmHg  The findings suggest moderate pulmonary hypertension  PULMONIC VALVE:  There was trace regurgitation  IVC, HEPATIC VEINS:  The inferior vena cava was mildly dilated  The respirophasic change in diameter was more than 50%  COMPARISONS:  Comparison was made with the previous study of 08-Apr-2019  Aortic stenosis has worsened  Pulmonary artery pressure has increased  HISTORY: PRIOR HISTORY: A  Flutter,A Fib ,chronic kidney disease,gout,heart failure,HTN,Pacemaker,pulmonary emphysema,sleep apnea,Angioplasty with stent placement,CAD,COPD,AAA  PROCEDURE: The procedure was performed at the bedside  This was a routine study  The transthoracic approach was used  The study included complete 2D imaging, M-mode, complete spectral Doppler, and color Doppler  The heart rate was 76 bpm,  at the start of the study  Images were obtained from the parasternal, apical, subcostal, and suprasternal notch acoustic windows  Intravenous contrast ( 0 4ml Definity in NSS) was administered to opacify the left ventricle and enhance  Doppler signals  Echocardiographic views were limited due to poor acoustic window availability, decreased penetration, and lung interference  This was a technically difficult study  LEFT VENTRICLE: Size was normal  Systolic function was normal  Ejection fraction was estimated in the range of 55 % to 60 % to be 55 %  Although no diagnostic regional wall motion abnormality was identified, this possibility cannot be  completely excluded on the basis of this study  Wall thickness was mildly to moderately increased   There was mild concentric hypertrophy  DOPPLER: The study was not technically sufficient to allow evaluation of LV diastolic function  RIGHT VENTRICLE: The ventricle was mildly dilated  Systolic function was normal     LEFT ATRIUM: The atrium was moderately to markedly dilated  RIGHT ATRIUM: The atrium was mildly to moderately dilated  A pacing wire was present  MITRAL VALVE: There was moderate annular calcification  There was mildly reduced leaflet separation  DOPPLER: Transmitral velocity was minimally increased  There was mild stenosis  Mean Gradient 6-7 mm of hg There was mild regurgitation  AORTIC VALVE: The valve was functionally bicuspid  Leaflets exhibited normal thickness and normal cuspal separation  DOPPLER: Transaortic velocity was increased due to valvular stenosis  There was moderate to severe stenosis  Shima 1 0 cm2,  peak gradient 65, mean 35 mm of hg There was no regurgitation  TRICUSPID VALVE: DOPPLER: There was mild to moderate regurgitation  Estimated peak PA pressure was 55 to 60 mmHg  The findings suggest moderate pulmonary hypertension  PULMONIC VALVE: DOPPLER: There was trace regurgitation  PERICARDIUM: There was no thickening or calcification  There was no pericardial effusion  AORTA: The root exhibited normal size  SYSTEMIC VEINS: IVC: The inferior vena cava was mildly dilated  The respirophasic change in diameter was more than 50%  SYSTEM MEASUREMENT TABLES    2D mode  AoR Diam 2D: 3 1 cm  LA Diam (2D): 5 3 cm  LA/Ao (2D): 1 71  FS (2D Teich): 25 7 %  IVSd (2D): 1 3 cm  LVDEV: 130 cmï¾³  LVESV: 64 7 cmï¾³  LVIDd(2D): 5 21 cm  LVISd (2D): 3 87 cm  LVOT Area 2D: 3 46 cmï¾²  LVPWd (2D): 1 33 cm  SV (Teich): 65 3 cmï¾³    Apical four chamber  LVEF A4C: 54 %    Unspecified Scan Mode  SHIMA Cont Eq (Peak Vitaly): 1 03 cmï¾²  LVOT Diam : 2 1 cm  LVOT Vmax: 1160 mm/s  LVOT Vmax; Mean: 1160 mm/s  Peak Grad ; Mean: 5 mm[Hg]  MV Peak A Vitaly: 434 mm/s  MV Peak E Vitaly   Mean: 1680 mm/s  MVA (PHT): 3 01 cmï¾²  PHT: 73 ms  Max P mm[Hg]  V Max: 2920 mm/s  Vmax: 3410 mm/s  TAPSE: 1 5 cm    [facility] Accredited Echocardiography [facility]    Prepared and electronically signed by    Diana Burton MD  Signed 14-Sep-2020 13:14:06    EKG:  Ventricular paced rhythm    Counseling / Coordination of Care Time: 45 minutes  Greater than 50% of total time spent on patient counseling and coordination of care  Code Status: Level 1 - Full Code  Collaboration of Care: Were Recommendations Directly Discussed with Primary Treatment Team? - Yes     Iqra Castaneda MD [facility]    "This note has been constructed using a voice recognition system  Therefore there may be syntax, spelling, and/or grammatical errors   Please call if you have any questions  "

## 2022-11-19 ENCOUNTER — APPOINTMENT (INPATIENT)
Dept: RADIOLOGY | Facility: HOSPITAL | Age: 82
End: 2022-11-19

## 2022-11-19 LAB
ALBUMIN SERPL BCP-MCNC: 2.5 G/DL (ref 3.5–5)
ALP SERPL-CCNC: 79 U/L (ref 46–116)
ALT SERPL W P-5'-P-CCNC: 23 U/L (ref 12–78)
ANION GAP SERPL CALCULATED.3IONS-SCNC: 10 MMOL/L (ref 4–13)
ANISOCYTOSIS BLD QL SMEAR: PRESENT
AST SERPL W P-5'-P-CCNC: 19 U/L (ref 5–45)
BASOPHILS NFR BLD MANUAL: 1 % (ref 0–1)
BILIRUB DIRECT SERPL-MCNC: 0.53 MG/DL (ref 0–0.2)
BILIRUB SERPL-MCNC: 1.66 MG/DL (ref 0.2–1)
BLD SMEAR INTERP: NORMAL
BUN SERPL-MCNC: 45 MG/DL (ref 5–25)
CALCIUM SERPL-MCNC: 8.2 MG/DL (ref 8.3–10.1)
CHLORIDE SERPL-SCNC: 104 MMOL/L (ref 96–108)
CO2 SERPL-SCNC: 26 MMOL/L (ref 21–32)
CREAT SERPL-MCNC: 1.94 MG/DL (ref 0.6–1.3)
ELLIPTOCYTES BLD QL SMEAR: PRESENT
ERYTHROCYTE [DISTWIDTH] IN BLOOD BY AUTOMATED COUNT: 19 % (ref 11.6–15.1)
GFR SERPL CREATININE-BSD FRML MDRD: 31 ML/MIN/1.73SQ M
GLUCOSE SERPL-MCNC: 123 MG/DL (ref 65–140)
HCT VFR BLD AUTO: 24.2 % (ref 36.5–49.3)
HELMET CELLS BLD QL SMEAR: PRESENT
HGB BLD-MCNC: 7.2 G/DL (ref 12–17)
HYPERCHROMIA BLD QL SMEAR: PRESENT
IMM EOSINOPHIL NFR BLD MANUAL: 1 % (ref 0–6)
LDH SERPL-CCNC: 203 U/L (ref 81–234)
LYMPHOCYTES NFR BLD: 3 % (ref 14–44)
MCH RBC QN AUTO: 26.4 PG (ref 26.8–34.3)
MCHC RBC AUTO-ENTMCNC: 29.8 G/DL (ref 31.4–37.4)
MCV RBC AUTO: 89 FL (ref 82–98)
METAMYELOCYTES NFR BLD MANUAL: 1 % (ref 0–1)
MONOCYTES NFR BLD AUTO: 4 % (ref 4–12)
NEUTS BAND NFR BLD MANUAL: 2 THOUSAND/UL
NEUTS SEG NFR BLD AUTO: 88 % (ref 45–77)
PLATELET # BLD AUTO: 207 THOUSANDS/UL (ref 149–390)
PMV BLD AUTO: 9.4 FL (ref 8.9–12.7)
POLYCHROMASIA BLD QL SMEAR: PRESENT
POTASSIUM SERPL-SCNC: 3.5 MMOL/L (ref 3.5–5.3)
PROT SERPL-MCNC: 6.8 G/DL (ref 6.4–8.4)
RBC # BLD AUTO: 2.73 MILLION/UL (ref 3.88–5.62)
RETICS # AUTO: ABNORMAL 10*3/UL (ref 14356–105094)
RETICS # CALC: 4.86 % (ref 0.37–1.87)
SODIUM SERPL-SCNC: 140 MMOL/L (ref 135–147)
TOTAL CELLS COUNTED SPEC: 100
WBC # BLD AUTO: 9.65 THOUSAND/UL (ref 4.31–10.16)

## 2022-11-19 RX ORDER — FUROSEMIDE 10 MG/ML
40 INJECTION INTRAMUSCULAR; INTRAVENOUS ONCE
Status: DISCONTINUED | OUTPATIENT
Start: 2022-11-19 | End: 2022-11-19

## 2022-11-19 RX ADMIN — PRAVASTATIN SODIUM 40 MG: 40 TABLET ORAL at 17:54

## 2022-11-19 RX ADMIN — DULOXETINE HYDROCHLORIDE 60 MG: 60 CAPSULE, DELAYED RELEASE ORAL at 17:54

## 2022-11-19 RX ADMIN — ISOSORBIDE MONONITRATE 30 MG: 30 TABLET, EXTENDED RELEASE ORAL at 10:39

## 2022-11-19 RX ADMIN — PANTOPRAZOLE SODIUM 40 MG: 40 INJECTION, POWDER, FOR SOLUTION INTRAVENOUS at 10:38

## 2022-11-19 RX ADMIN — IPRATROPIUM BROMIDE AND ALBUTEROL SULFATE 3 ML: 2.5; .5 SOLUTION RESPIRATORY (INHALATION) at 19:53

## 2022-11-19 RX ADMIN — FUROSEMIDE 40 MG: 10 INJECTION, SOLUTION INTRAMUSCULAR; INTRAVENOUS at 10:38

## 2022-11-19 RX ADMIN — DULOXETINE HYDROCHLORIDE 60 MG: 60 CAPSULE, DELAYED RELEASE ORAL at 10:39

## 2022-11-19 RX ADMIN — IPRATROPIUM BROMIDE AND ALBUTEROL SULFATE 3 ML: 2.5; .5 SOLUTION RESPIRATORY (INHALATION) at 13:50

## 2022-11-19 RX ADMIN — SODIUM CHLORIDE 200 MG: 900 INJECTION, SOLUTION INTRAVENOUS at 17:55

## 2022-11-19 RX ADMIN — IPRATROPIUM BROMIDE AND ALBUTEROL SULFATE 3 ML: 2.5; .5 SOLUTION RESPIRATORY (INHALATION) at 07:38

## 2022-11-19 RX ADMIN — FLUCONAZOLE 100 MG: 100 TABLET ORAL at 10:38

## 2022-11-19 RX ADMIN — PANTOPRAZOLE SODIUM 40 MG: 40 INJECTION, POWDER, FOR SOLUTION INTRAVENOUS at 21:04

## 2022-11-19 RX ADMIN — IPRATROPIUM BROMIDE AND ALBUTEROL SULFATE 3 ML: 2.5; .5 SOLUTION RESPIRATORY (INHALATION) at 01:20

## 2022-11-19 RX ADMIN — GABAPENTIN 100 MG: 100 CAPSULE ORAL at 21:03

## 2022-11-19 RX ADMIN — FLUTICASONE FUROATE AND VILANTEROL TRIFENATATE 1 PUFF: 100; 25 POWDER RESPIRATORY (INHALATION) at 10:41

## 2022-11-19 RX ADMIN — ALLOPURINOL 200 MG: 100 TABLET ORAL at 10:39

## 2022-11-19 RX ADMIN — FINASTERIDE 5 MG: 5 TABLET, FILM COATED ORAL at 10:43

## 2022-11-19 NOTE — ASSESSMENT & PLAN NOTE
Per outpatient pulmonary note, patient was started on prednisone taper due to shortness of breath  · Holding off on prednisone in the setting of GI bleed  · Shortness of breath is most likely due to anemia, CHF instead  · Continue with fluticasone/vilanterol  · Continue DuoNeb q 6 hours

## 2022-11-19 NOTE — ASSESSMENT & PLAN NOTE
Patient presented to the ER with shortness of breath and outpatient abnormal lab work done by patient's cardiologist   reports 2-3 "chocolate colored" BMs  · On admission, hemoglobin 7 1  Stool occult positive per ER physician  · Patient was on iron previously which he stopped taking few months ago which was causing dark/black colored stools  · Hemoglobin 7 2 after receiving 1 unit PRBC transfusion  · iron panel reviewed - started IV Venofer for 3 days  · As per EGD 11/18, no signs of upper GI bleeding present  Signs of mild gastritis but awaiting results of biopsy  Signs of Candida esophagitis  Began Diflucan  · IV Protonix b i d   · Continue to monitor with repeat lab work in a m      Results from last 7 days   Lab Units 11/19/22  0502 11/18/22  1748 11/18/22  0519 11/17/22  1327   HEMOGLOBIN g/dL 7 2* 7 6* 7 2* 7 1*   HEMATOCRIT % 24 2* 25 5* 23 6* 23 8*   MCV fL 89  --  86 86

## 2022-11-19 NOTE — PLAN OF CARE
Problem: MOBILITY - ADULT  Goal: Maintain or return to baseline ADL function  Description: INTERVENTIONS:  -  Assess patient's ability to carry out ADLs; assess patient's baseline for ADL function and identify physical deficits which impact ability to perform ADLs (bathing, care of mouth/teeth, toileting, grooming, dressing, etc )  - Assess/evaluate cause of self-care deficits   - Assess range of motion  - Assess patient's mobility; develop plan if impaired  - Assess patient's need for assistive devices and provide as appropriate  - Encourage maximum independence but intervene and supervise when necessary  - Involve family in performance of ADLs  - Assess for home care needs following discharge   - Consider OT consult to assist with ADL evaluation and planning for discharge  - Provide patient education as appropriate  Outcome: Progressing  Goal: Maintains/Returns to pre admission functional level  Description: INTERVENTIONS:  - Perform BMAT or MOVE assessment daily    - Set and communicate daily mobility goal to care team and patient/family/caregiver  - Collaborate with rehabilitation services on mobility goals if consulted  - Perform Range of Motion 2 times a day  - Reposition patient every 2 hours    - Dangle patient 2 times a day  - Stand patient 2 times a day  - Ambulate patient 2 times a day  - Out of bed to chair 3 times a day   - Out of bed for meals 3 times a day  - Out of bed for toileting  - Record patient progress and toleration of activity level   Outcome: Progressing     Problem: Prexisting or High Potential for Compromised Skin Integrity  Goal: Skin integrity is maintained or improved  Description: INTERVENTIONS:  - Identify patients at risk for skin breakdown  - Assess and monitor skin integrity  - Assess and monitor nutrition and hydration status  - Monitor labs   - Assess for incontinence   - Turn and reposition patient  - Assist with mobility/ambulation  - Relieve pressure over bony prominences  - Avoid friction and shearing  - Provide appropriate hygiene as needed including keeping skin clean and dry  - Evaluate need for skin moisturizer/barrier cream  - Collaborate with interdisciplinary team   - Patient/family teaching  - Consider wound care consult   Outcome: Progressing     Problem: Potential for Falls  Goal: Patient will remain free of falls  Description: INTERVENTIONS:  - Educate patient/family on patient safety including physical limitations  - Instruct patient to call for assistance with activity   - Consult OT/PT to assist with strengthening/mobility   - Keep Call bell within reach  - Keep bed low and locked with side rails adjusted as appropriate  - Keep care items and personal belongings within reach  - Initiate and maintain comfort rounds  - Make Fall Risk Sign visible to staff  - Offer Toileting every 2 Hours, in advance of need  - Initiate/Maintain bed alarm  - Obtain necessary fall risk management equipment:   - Apply yellow socks and bracelet for high fall risk patients  - Consider moving patient to room near nurses station  Outcome: Progressing     Problem: RESPIRATORY - ADULT  Goal: Achieves optimal ventilation and oxygenation  Description: INTERVENTIONS:  - Assess for changes in respiratory status  - Assess for changes in mentation and behavior  - Position to facilitate oxygenation and minimize respiratory effort  - Oxygen administered by appropriate delivery if ordered  - Initiate smoking cessation education as indicated  - Encourage broncho-pulmonary hygiene including cough, deep breathe, Incentive Spirometry  - Assess the need for suctioning and aspirate as needed  - Assess and instruct to report SOB or any respiratory difficulty  - Respiratory Therapy support as indicated  Outcome: Progressing     Problem: DISCHARGE PLANNING  Goal: Discharge to home or other facility with appropriate resources  Description: INTERVENTIONS:  - Identify barriers to discharge w/patient and caregiver  - Arrange for needed discharge resources and transportation as appropriate  - Identify discharge learning needs (meds, wound care, etc )  - Arrange for interpretive services to assist at discharge as needed  - Refer to Case Management Department for coordinating discharge planning if the patient needs post-hospital services based on physician/advanced practitioner order or complex needs related to functional status, cognitive ability, or social support system  Outcome: Progressing     Problem: Knowledge Deficit  Goal: Patient/family/caregiver demonstrates understanding of disease process, treatment plan, medications, and discharge instructions  Description: Complete learning assessment and assess knowledge base    Interventions:  - Provide teaching at level of understanding  - Provide teaching via preferred learning methods  Outcome: Progressing

## 2022-11-19 NOTE — PROGRESS NOTES
[address] 45  Progress Note Jessica Yusuf 1940, 80 y o  male MRN: [id number]  Unit/Bed#: [address] [place] [address] Encounter: [id number]  Primary Care Provider: Rogelio Quigley DO   Date and time admitted to hospital: 11/17/2022 12:08 PM    * Symptomatic anemia  Assessment & Plan  Patient presented to the ER with shortness of breath and outpatient abnormal lab work done by patient's cardiologist   reports 2-3 "chocolate colored" BMs  · On admission, hemoglobin 7 1  Stool occult positive per ER physician  · Patient was on iron previously which he stopped taking few months ago which was causing dark/black colored stools  · Hemoglobin 7 2 after receiving 1 unit PRBC transfusion  · iron panel reviewed - started IV Venofer for 3 days  · As per EGD 11/18, no signs of upper GI bleeding present  Signs of mild gastritis but awaiting results of biopsy  Signs of Candida esophagitis  Began Diflucan  · IV Protonix b i d   · Continue to monitor with repeat lab work in a m  Results from last 7 days   Lab Units 11/19/22  0502 11/18/22  1748 11/18/22  0519 11/17/22  1327   HEMOGLOBIN g/dL 7 2* 7 6* 7 2* 7 1*   HEMATOCRIT % 24 2* 25 5* 23 6* 23 8*   MCV fL 89  --  86 86       Acute on chronic diastolic heart failure (HCC)  Assessment & Plan  Wt Readings from Last 3 Encounters:   11/19/22 119 kg (262 lb 6 4 oz)   10/20/22 120 kg (265 lb)   10/05/22 119 kg (262 lb)     Patient reports worsening leg swelling compared to baseline along with shortness of breath    ProBNP elevated with chest x-ray showing moderate congestive changes  · Received 1 dose of 60 mg of IV Lasix on admission post blood transfusion  · Was getting 40mg of IV Lasix bid, 1 dose given today and will discontinue IV Lasix  · Start 40 mg of Demadex starting tomorrow per Cardiology if creatinine stable  · Monitor I/O and daily weights        Elevated troponin  Assessment & Plan  Initial troponin 226 --> 230 --> 181  Likely non MI troponin elevation in the setting of anemia, CHF  Appreciate Cardiology input  Moderate to severe aortic stenosis  Assessment & Plan  Outpatient evaluation for TAVR  COPD (chronic obstructive pulmonary disease) ([address] )  Assessment & Plan  Per outpatient pulmonary note, patient was started on prednisone taper due to shortness of breath  · Holding off on prednisone in the setting of GI bleed  · Shortness of breath is most likely due to anemia, CHF instead  · Continue with fluticasone/vilanterol  · Continue DuoNeb q 6 hours  Obesity (BMI 30-39  9)  Assessment & Plan  Body mass index is 33 69 kg/m²  Continue with dietary/lifestyle modifications    CAD (coronary artery disease)  Assessment & Plan  · Status post stent placement  · Continue Imdur 30mg and Pravachol 40mg  ·  Holding Plavix    Stage 3a chronic kidney disease [facility])  Assessment & Plan  Lab Results   Component Value Date    EGFR 31 11/19/2022    EGFR 33 11/18/2022    EGFR 30 11/17/2022    CREATININE 1 94 (H) 11/19/2022    CREATININE 1 83 (H) 11/18/2022    CREATININE 2 00 (H) 11/17/2022     CKD stage 3 with baseline creatinine 1 3-1 5  Follows up with [facility] Nephrology as outpatient  · On admission, creatinine of 2, which is likely cardiorenal in nature  · Creatinine overall stable  Will likely need to accept higher baseline to keep patient euvolemic  · Continue to monitor with repeat blood work in am        VTE Pharmacologic Prophylaxis: VTE Score: 4 Moderate Risk (Score 3-4) - Pharmacological DVT Prophylaxis Contraindicated  Sequential Compression Devices Ordered  Patient Centered Rounds: I performed bedside rounds with nursing staff today  Discussions with Specialists or Other Care Team Provider: yes - cardio, GI    Education and Discussions with Family / Patient: Updated  (wife and daughter) at bedside  Time Spent for Care: 35 min   More than 50% of total time spent on counseling and coordination of care as described above     Current Length of Stay: 2 day(s)  Current Patient Status: Inpatient   Certification Statement: The patient will continue to require additional inpatient hospital stay due to Symptomatic anemia, CHF exacerbation  Discharge Plan: Anticipate discharge in 48-72 hrs to home  Code Status: Level 1 - Full Code    Subjective:     Patient states breathing is improving  wants to know when he can go home    Objective:     Vitals:   Temp (24hrs), Av 9 °F (36 6 °C), Min:97 7 °F (36 5 °C), Max:98 3 °F (36 8 °C)    Temp:  [97 7 °F (36 5 °C)-98 3 °F (36 8 °C)] 97 9 °F (36 6 °C)  HR:  [65-74] 70  Resp:  [18-20] 20  BP: (114-137)/(51-64) 116/51  SpO2:  [93 %-100 %] 96 %  Body mass index is 33 69 kg/m²  Input and Output Summary (last 24 hours): Intake/Output Summary (Last 24 hours) at 2022 1009  Last data filed at 2022 0500  Gross per 24 hour   Intake 1460 ml   Output 2405 ml   Net -945 ml       Physical Exam:   Physical Exam  Vitals reviewed  Constitutional:       General: He is not in acute distress  Appearance: He is not ill-appearing  HENT:      Head: Normocephalic and atraumatic  Eyes:      General:         Right eye: No discharge  Left eye: No discharge  Extraocular Movements: Extraocular movements intact  Cardiovascular:      Rate and Rhythm: Normal rate and regular rhythm  Heart sounds: Murmur heard  Pulmonary:      Effort: Pulmonary effort is normal  No respiratory distress  Breath sounds: Normal breath sounds  No wheezing or rales  Abdominal:      General: Bowel sounds are normal  There is no distension  Palpations: Abdomen is soft  Tenderness: There is no abdominal tenderness  Musculoskeletal:      Comments: Bilateral lower extremity edema resolved   Skin:     Comments: Multiple areas of ecchymosis   Neurological:      Mental Status: He is alert and oriented to person, place, and time            Additional Data:     Labs:  Results from last 7 days   Lab Units 11/19/22  0502 11/18/22  0519 11/17/22  1327   WBC Thousand/uL 9 65   < > 11 96*   HEMOGLOBIN g/dL 7 2*   < > 7 1*   HEMATOCRIT % 24 2*   < > 23 8*   PLATELETS Thousands/uL 207   < > 236   NEUTROS PCT %  --   --  88*   LYMPHS PCT %  --   --  5*   MONOS PCT %  --   --  6   EOS PCT % 1  --  0    < > = values in this interval not displayed  Results from last 7 days   Lab Units 11/19/22  0502   SODIUM mmol/L 140   POTASSIUM mmol/L 3 5   CHLORIDE mmol/L 104   CO2 mmol/L 26   BUN mg/dL 45*   CREATININE mg/dL 1 94*   ANION GAP mmol/L 10   CALCIUM mg/dL 8 2*   ALBUMIN g/dL 2 5*   TOTAL BILIRUBIN mg/dL 1 66*   ALK PHOS U/L 79   ALT U/L 23   AST U/L 19   GLUCOSE RANDOM mg/dL 123     Results from last 7 days   Lab Units 11/17/22  1327   INR  1 93*                   Lines/Drains:  Invasive Devices     Peripheral Intravenous Line  Duration           Peripheral IV 11/18/22 Dorsal (posterior); Left <1 day                  Imaging: No pertinent imaging reviewed      Recent Cultures (last 7 days):         Last 24 Hours Medication List:   Current Facility-Administered Medications   Medication Dose Route Frequency Provider Last Rate   • allopurinol  200 mg Oral Daily Jaycob Lisa Goodwin MD     • DULoxetine  60 mg Oral BID Marianne Degroot MD     • finasteride  5 mg Oral Daily Marianne Degroot MD     • fluconazole  100 mg Oral Daily Marianne Degroot MD     • Fluticasone Furoate-Vilanterol  1 puff Inhalation Daily Marianne Degroot MD     • furosemide  40 mg Intravenous BID (diuretic) Marianne Degroot MD     • gabapentin  100 mg Oral HS Marianne Degroot MD     • ipratropium-albuterol  3 mL Nebulization Q6H Marianne Degroot MD     • isosorbide mononitrate  30 mg Oral Daily Marianne Degroot MD     • nicotine  1 patch Transdermal Daily Marianne Degroot MD     • pantoprazole  40 mg Intravenous Q12H [address]  Lisa Goodwin MD     • polyethylene glycol  17 g Oral Daily Jennifer Peterson MD     • pravastatin  40 mg Oral Daily With Juan Chapa MD          Today, Patient Was Seen By: Loli Mckeon DO    **Please Note: This note may have been constructed using a voice recognition system  **

## 2022-11-19 NOTE — ASSESSMENT & PLAN NOTE
Initial troponin 226 --> 230 --> 181  Likely non MI troponin elevation in the setting of anemia, CHF  Appreciate Cardiology input

## 2022-11-19 NOTE — ASSESSMENT & PLAN NOTE
Wt Readings from Last 3 Encounters:   11/19/22 119 kg (262 lb 6 4 oz)   10/20/22 120 kg (265 lb)   10/05/22 119 kg (262 lb)     Patient reports worsening leg swelling compared to baseline along with shortness of breath    ProBNP elevated with chest x-ray showing moderate congestive changes  · Received 1 dose of 60 mg of IV Lasix on admission post blood transfusion  · Was getting 40mg of IV Lasix bid, 1 dose given today and will discontinue IV Lasix  · Start 40 mg of Demadex starting tomorrow per Cardiology if creatinine stable  · Monitor I/O and daily weights

## 2022-11-19 NOTE — ASSESSMENT & PLAN NOTE
Lab Results   Component Value Date    EGFR 31 11/19/2022    EGFR 33 11/18/2022    EGFR 30 11/17/2022    CREATININE 1 94 (H) 11/19/2022    CREATININE 1 83 (H) 11/18/2022    CREATININE 2 00 (H) 11/17/2022     CKD stage 3 with baseline creatinine 1 3-1 5  Follows up with [facility] Nephrology as outpatient  · On admission, creatinine of 2, which is likely cardiorenal in nature  · Creatinine overall stable    Will likely need to accept higher baseline to keep patient euvolemic  · Continue to monitor with repeat blood work in am

## 2022-11-20 LAB
ANION GAP SERPL CALCULATED.3IONS-SCNC: 9 MMOL/L (ref 4–13)
BUN SERPL-MCNC: 36 MG/DL (ref 5–25)
CALCIUM SERPL-MCNC: 8.6 MG/DL (ref 8.3–10.1)
CHLORIDE SERPL-SCNC: 104 MMOL/L (ref 96–108)
CO2 SERPL-SCNC: 27 MMOL/L (ref 21–32)
CREAT SERPL-MCNC: 1.84 MG/DL (ref 0.6–1.3)
DAT POLY-SP REAG RBC QL: NEGATIVE
GFR SERPL CREATININE-BSD FRML MDRD: 33 ML/MIN/1.73SQ M
GLUCOSE SERPL-MCNC: 141 MG/DL (ref 65–140)
HAPTOGLOB SERPL-MCNC: 106 MG/DL (ref 38–329)
HCT VFR BLD AUTO: 25.2 % (ref 36.5–49.3)
HGB BLD-MCNC: 7.5 G/DL (ref 12–17)
POTASSIUM SERPL-SCNC: 3.2 MMOL/L (ref 3.5–5.3)
SODIUM SERPL-SCNC: 140 MMOL/L (ref 135–147)

## 2022-11-20 RX ORDER — POTASSIUM CHLORIDE 20 MEQ/1
40 TABLET, EXTENDED RELEASE ORAL ONCE
Status: COMPLETED | OUTPATIENT
Start: 2022-11-20 | End: 2022-11-20

## 2022-11-20 RX ORDER — TORSEMIDE 20 MG/1
40 TABLET ORAL DAILY
Status: DISCONTINUED | OUTPATIENT
Start: 2022-11-20 | End: 2022-11-22 | Stop reason: HOSPADM

## 2022-11-20 RX ORDER — PANTOPRAZOLE SODIUM 40 MG/1
40 TABLET, DELAYED RELEASE ORAL
Status: DISCONTINUED | OUTPATIENT
Start: 2022-11-21 | End: 2022-11-22 | Stop reason: HOSPADM

## 2022-11-20 RX ORDER — NYSTATIN 100000 [USP'U]/G
POWDER TOPICAL 2 TIMES DAILY
Status: DISCONTINUED | OUTPATIENT
Start: 2022-11-20 | End: 2022-11-22 | Stop reason: HOSPADM

## 2022-11-20 RX ADMIN — NYSTATIN 1 APPLICATION: 100000 POWDER TOPICAL at 18:16

## 2022-11-20 RX ADMIN — PANTOPRAZOLE SODIUM 40 MG: 40 INJECTION, POWDER, FOR SOLUTION INTRAVENOUS at 09:17

## 2022-11-20 RX ADMIN — TORSEMIDE 40 MG: 20 TABLET ORAL at 11:29

## 2022-11-20 RX ADMIN — IPRATROPIUM BROMIDE AND ALBUTEROL SULFATE 3 ML: 2.5; .5 SOLUTION RESPIRATORY (INHALATION) at 20:29

## 2022-11-20 RX ADMIN — BISACODYL 20 MG: 5 TABLET, COATED ORAL at 15:57

## 2022-11-20 RX ADMIN — POTASSIUM CHLORIDE 40 MEQ: 1500 TABLET, EXTENDED RELEASE ORAL at 11:29

## 2022-11-20 RX ADMIN — IPRATROPIUM BROMIDE AND ALBUTEROL SULFATE 3 ML: 2.5; .5 SOLUTION RESPIRATORY (INHALATION) at 07:33

## 2022-11-20 RX ADMIN — FLUCONAZOLE 100 MG: 100 TABLET ORAL at 09:17

## 2022-11-20 RX ADMIN — PRAVASTATIN SODIUM 40 MG: 40 TABLET ORAL at 15:58

## 2022-11-20 RX ADMIN — ISOSORBIDE MONONITRATE 30 MG: 30 TABLET, EXTENDED RELEASE ORAL at 09:17

## 2022-11-20 RX ADMIN — GABAPENTIN 100 MG: 100 CAPSULE ORAL at 21:05

## 2022-11-20 RX ADMIN — POLYETHYLENE GLYCOL 3350, SODIUM SULFATE ANHYDROUS, SODIUM BICARBONATE, SODIUM CHLORIDE, POTASSIUM CHLORIDE 4000 ML: 236; 22.74; 6.74; 5.86; 2.97 POWDER, FOR SOLUTION ORAL at 15:57

## 2022-11-20 RX ADMIN — ALLOPURINOL 200 MG: 100 TABLET ORAL at 09:17

## 2022-11-20 RX ADMIN — POLYETHYLENE GLYCOL 3350 17 G: 17 POWDER, FOR SOLUTION ORAL at 09:17

## 2022-11-20 RX ADMIN — FINASTERIDE 5 MG: 5 TABLET, FILM COATED ORAL at 09:17

## 2022-11-20 RX ADMIN — SODIUM CHLORIDE 200 MG: 900 INJECTION, SOLUTION INTRAVENOUS at 18:14

## 2022-11-20 RX ADMIN — DULOXETINE HYDROCHLORIDE 60 MG: 60 CAPSULE, DELAYED RELEASE ORAL at 18:14

## 2022-11-20 RX ADMIN — IPRATROPIUM BROMIDE AND ALBUTEROL SULFATE 3 ML: 2.5; .5 SOLUTION RESPIRATORY (INHALATION) at 13:51

## 2022-11-20 RX ADMIN — FLUTICASONE FUROATE AND VILANTEROL TRIFENATATE 1 PUFF: 100; 25 POWDER RESPIRATORY (INHALATION) at 09:20

## 2022-11-20 RX ADMIN — DULOXETINE HYDROCHLORIDE 60 MG: 60 CAPSULE, DELAYED RELEASE ORAL at 09:17

## 2022-11-20 RX ADMIN — IPRATROPIUM BROMIDE AND ALBUTEROL SULFATE 3 ML: 2.5; .5 SOLUTION RESPIRATORY (INHALATION) at 02:56

## 2022-11-20 NOTE — ASSESSMENT & PLAN NOTE
Patient presented to the ER with shortness of breath and outpatient abnormal lab work done by patient's cardiologist   reports 2-3 "chocolate colored" BMs  · On admission, hemoglobin 7 1  Stool occult positive per ER physician  · Anemia likely multifactorial - iron deficiency, patient with moderate to severe aortic stenosis, CKD, possible GI bleed  · Patient was on iron previously which he stopped taking few months ago which was causing dark/black colored stools  · Status post 1 unit PRBC transfusion  Hemolysis labs were done and negative for hemolysis  · iron panel reviewed - continue IV Venofer for 3 days  · As per EGD 11/18, no signs of upper GI bleeding present  Signs of mild gastritis but awaiting results of biopsy  Signs of Candida esophagitis  Started on Diflucan  · Plan for colonoscopy tomorrow  · IV Protonix b i d   · Continue to monitor with repeat lab work in a m      Results from last 7 days   Lab Units 11/20/22  0518 11/19/22  0502 11/18/22  1748 11/18/22  0519 11/17/22  1327   HEMOGLOBIN g/dL 7 5* 7 2* 7 6* 7 2* 7 1*   HEMATOCRIT % 25 2* 24 2* 25 5* 23 6* 23 8*   MCV fL  --  89  --  86 86

## 2022-11-20 NOTE — ASSESSMENT & PLAN NOTE
Lab Results   Component Value Date    EGFR 33 11/20/2022    EGFR 31 11/19/2022    EGFR 33 11/18/2022    CREATININE 1 84 (H) 11/20/2022    CREATININE 1 94 (H) 11/19/2022    CREATININE 1 83 (H) 11/18/2022     CKD stage 3 with baseline creatinine 1 3-1 5  Follows up with [address] [place] [address] Nephrology as outpatient  · On admission, creatinine of 2, which is likely cardiorenal in nature  · Creatinine overall stable  Will likely need to accept higher baseline to keep patient euvolemic  · Repeat labs in a m

## 2022-11-20 NOTE — PROGRESS NOTES
Ricarda U  66   Progress Note Mare Grant 1940, 80 y o  male MRN: [id number]  Unit/Bed#: [address] [place] [address] Encounter: [id number]  Primary Care Provider: Brittany Hammond DO   Date and time admitted to hospital: 11/17/2022 12:08 PM    * Symptomatic anemia  Assessment & Plan  Patient presented to the ER with shortness of breath and outpatient abnormal lab work done by patient's cardiologist   reports 2-3 "chocolate colored" BMs  · On admission, hemoglobin 7 1  Stool occult positive per ER physician  · Anemia likely multifactorial - iron deficiency, patient with moderate to severe aortic stenosis, CKD, possible GI bleed  · Patient was on iron previously which he stopped taking few months ago which was causing dark/black colored stools  · Status post 1 unit PRBC transfusion  Hemolysis labs were done and negative for hemolysis  · iron panel reviewed - continue IV Venofer for 3 days  · As per EGD 11/18, no signs of upper GI bleeding present  Signs of mild gastritis but awaiting results of biopsy  Signs of Candida esophagitis  Started on Diflucan  · Plan for colonoscopy tomorrow  · IV Protonix b i d   · Continue to monitor with repeat lab work in a m  Results from last 7 days   Lab Units 11/20/22  0518 11/19/22  0502 11/18/22  1748 11/18/22  0519 11/17/22  1327   HEMOGLOBIN g/dL 7 5* 7 2* 7 6* 7 2* 7 1*   HEMATOCRIT % 25 2* 24 2* 25 5* 23 6* 23 8*   MCV fL  --  89  --  86 86       Acute on chronic diastolic heart failure (HCC)  Assessment & Plan  Wt Readings from Last 3 Encounters:   11/20/22 118 kg (260 lb 2 3 oz)   10/20/22 120 kg (265 lb)   10/05/22 119 kg (262 lb)     Reported worsening leg swelling compared to baseline along with shortness of breath    ProBNP elevated with chest x-ray showing moderate congestive changes  · Received 1 dose of 60 mg of IV Lasix on admission post blood transfusion  · Was getting 40mg of IV Lasix bid, 1 dose given on 11/19 and IV Lasix discontinued  · Start 40 mg of Demadex starting today per Cardiology  · Monitor I/O and daily weights        Elevated troponin  Assessment & Plan  Initial troponin 226 --> 230 --> 181  Likely non MI troponin elevation in the setting of anemia, CHF  Appreciate Cardiology input  Moderate to severe aortic stenosis  Assessment & Plan  Discussed with patient and family - Outpatient evaluation for TAVR  COPD (chronic obstructive pulmonary disease) ([facility] [address] )  Assessment & Plan  Per outpatient pulmonary note, patient was started on prednisone taper due to shortness of breath  · Holding off on prednisone in the setting of GI bleed  · Shortness of breath is most likely due to anemia, CHF instead  Doubtful COPD exacerbation  · Continue with fluticasone/vilanterol  · Continue DuoNeb q 6 hours  Obesity (BMI 30-39  9)  Assessment & Plan  Body mass index is 33 4 kg/m²  Continue with dietary/lifestyle modifications    CAD (coronary artery disease)  Assessment & Plan  · Status post stent placement  · Continue Imdur 30mg and Pravachol 40mg  · Continue to hold Plavix    Stage 3a chronic kidney disease [facility])  Assessment & Plan  Lab Results   Component Value Date    EGFR 33 11/20/2022    EGFR 31 11/19/2022    EGFR 33 11/18/2022    CREATININE 1 84 (H) 11/20/2022    CREATININE 1 94 (H) 11/19/2022    CREATININE 1 83 (H) 11/18/2022     CKD stage 3 with baseline creatinine 1 3-1 5  Follows up with [facility] as outpatient  · On admission, creatinine of 2, which is likely cardiorenal in nature  · Creatinine overall stable  Will likely need to accept higher baseline to keep patient euvolemic  · Repeat labs in a m  VTE Pharmacologic Prophylaxis: VTE Score: 4 Moderate Risk (Score 3-4) - Pharmacological DVT Prophylaxis Contraindicated  Sequential Compression Devices Ordered  Patient Centered Rounds: I performed bedside rounds with nursing staff today    Discussions with Specialists or Other Care Team Provider: yes - GI, cardio    Education and Discussions with Family / Patient: Updated  (wife) via phone  Time Spent for Care: 35 min  More than 50% of total time spent on counseling and coordination of care as described above  Current Length of Stay: 3 day(s)  Current Patient Status: Inpatient   Certification Statement: The patient will continue to require additional inpatient hospital stay due to Symptomatic anemia requiring IV Venofer and colonoscopy tomorrow for further evaluation  Discharge Plan: Anticipate discharge in 24-48 hrs to home  Code Status: Level 1 - Full Code    Subjective:     Patient states breathing has improved compared to before although not fully back at baseline    Objective:     Vitals:   Temp (24hrs), Av 5 °F (36 4 °C), Min:96 8 °F (36 °C), Max:98 2 °F (36 8 °C)    Temp:  [96 8 °F (36 °C)-98 2 °F (36 8 °C)] 98 2 °F (36 8 °C)  HR:  [66-70] 70  Resp:  [18-23] 18  BP: (119-123)/(52-57) 122/57  SpO2:  [92 %-100 %] 100 %  Body mass index is 33 4 kg/m²  Input and Output Summary (last 24 hours): Intake/Output Summary (Last 24 hours) at 2022 1040  Last data filed at 2022 0649  Gross per 24 hour   Intake 740 ml   Output 1625 ml   Net -885 ml       Physical Exam:   Physical Exam  Vitals reviewed  Constitutional:       General: He is not in acute distress  Appearance: He is not ill-appearing  HENT:      Head: Normocephalic and atraumatic  Eyes:      Extraocular Movements: Extraocular movements intact  Cardiovascular:      Rate and Rhythm: Normal rate and regular rhythm  Heart sounds: Murmur heard  Pulmonary:      Effort: Pulmonary effort is normal  No respiratory distress  Breath sounds: Normal breath sounds  No wheezing or rales  Abdominal:      General: Bowel sounds are normal  There is no distension  Palpations: Abdomen is soft  Tenderness: There is no abdominal tenderness  Musculoskeletal:      Right lower leg: No edema  Left lower leg: No edema  Skin:     Comments: Multiple areas of ecchymosis   Neurological:      Mental Status: He is alert and oriented to person, place, and time  Additional Data:     Labs:  Results from last 7 days   Lab Units 11/20/22  0518 11/19/22  0502 11/18/22  0519 11/17/22  1327   WBC Thousand/uL  --  9 65   < > 11 96*   HEMOGLOBIN g/dL 7 5* 7 2*   < > 7 1*   HEMATOCRIT % 25 2* 24 2*   < > 23 8*   PLATELETS Thousands/uL  --  207   < > 236   NEUTROS PCT %  --   --   --  88*   LYMPHS PCT %  --   --   --  5*   MONOS PCT %  --   --   --  6   EOS PCT %  --  1  --  0    < > = values in this interval not displayed  Results from last 7 days   Lab Units 11/20/22  0518 11/19/22  0502   SODIUM mmol/L 140 140   POTASSIUM mmol/L 3 2* 3 5   CHLORIDE mmol/L 104 104   CO2 mmol/L 27 26   BUN mg/dL 36* 45*   CREATININE mg/dL 1 84* 1 94*   ANION GAP mmol/L 9 10   CALCIUM mg/dL 8 6 8 2*   ALBUMIN g/dL  --  2 5*   TOTAL BILIRUBIN mg/dL  --  1 66*   ALK PHOS U/L  --  79   ALT U/L  --  23   AST U/L  --  19   GLUCOSE RANDOM mg/dL 141* 123     Results from last 7 days   Lab Units 11/17/22  1327   INR  1 93*                   Lines/Drains:  Invasive Devices     Peripheral Intravenous Line  Duration           Peripheral IV 11/19/22 Dorsal (posterior); Left Forearm <1 day                Imaging: Reviewed radiology reports from this admission including: abdominal/pelvic CT    Recent Cultures (last 7 days):         Last 24 Hours Medication List:   Current Facility-Administered Medications   Medication Dose Route Frequency Provider Last Rate   • allopurinol  200 mg Oral Daily Jaycob Sampson Walker MD     • DULoxetine  60 mg Oral BID Justin Chávez MD     • finasteride  5 mg Oral Daily Justin Chávez MD     • fluconazole  100 mg Oral Daily Justin Chávez MD     • Fluticasone Furoate-Vilanterol  1 puff Inhalation Daily Justin Chávez MD     • gabapentin  100 mg Oral HS Jaycob Cheryl Wanda Pitt MD     • ipratropium-albuterol  3 mL Nebulization Q6H Jaycob Mortimer Ruder, MD     • iron sucrose  200 mg Intravenous Daily Lynn Montano DO     • isosorbide mononitrate  30 mg Oral Daily Carolina Pro MD     • nicotine  1 patch Transdermal Daily Carolina Pro MD     • pantoprazole  40 mg Intravenous Q12H [address]  Mortimer Ruder, MD     • polyethylene glycol  17 g Oral Daily Carolina Pro MD     • potassium chloride  40 mEq Oral Once Lynn Montano DO     • pravastatin  40 mg Oral Daily With Maritza Miranda MD     • torsemide  40 mg Oral Daily Sabrina Elias DO          Today, Patient Was Seen By: Sabrina Elias DO    **Please Note: This note may have been constructed using a voice recognition system  **

## 2022-11-20 NOTE — PROGRESS NOTES
Cardiology Inpatient Progress Note  [facility]   [facility]  1940  [id number]  PCP: Rafi Ramos DO  Date of Admission:  11/17/2022    Assessment/Plan:   Acute on chronic diastolic heart failure in the setting of anemia/GI bleeding- patient with improved volume status with IV diuresis  He has been transition to p o  torsemide  Recommend to continue as an outpatient  Continue low-sodium diet  Coronary artery disease with prior PCI- he has had stenting more than a year prior  Given his current acute GI bleeding and anemia, okay to discontinue antiplatelets indefinitely  His direct oral anticoagulant can serve both for his AFib and remote history of PCI    Moderate to severe aortic stenosis- 2 duration for TAVR workup as an outpatient    Candida esophagitis- started on Diflucan    Non MI troponin elevation in the setting of anemia and CHF/coronary artery disease- currently on isosorbide mononitrate 30 mg    Chronic atrial fibrillation/sick sinus syndrome- resume direct oral anticoagulant when cleared by GI    Subjective:   He is able to lie flat without shortness of breath  Denies having chest heaviness    Review of Systems:   CONSTITUTIONAL:  As per hpi  HEENT:  Eyes:  No visual loss, blurred vision, double vision or yellow sclerae  Ears, Nose, Throat:  No hearing loss, sneezing, congestion, runny nose or sore throat  SKIN:  No rash or itching  CARDIOVASCULAR:  As per hpi  RESPIRATORY:  As per hpi  GASTROINTESTINAL:  No anorexia, nausea, vomiting or diarrhea  No abdominal pain or blood  GENITOURINARY:  Burning on urination  No flank pain  NEUROLOGICAL:  No headache, dizziness, syncope, paralysis, ataxia, numbness or tingling in the extremities  No change in bowel or bladder control  MUSCULOSKELETAL:  No muscle, back pain, joint pain or stiffness  HEMATOLOGIC:  No anemia, bleeding or bruising  LYMPHATICS:  No enlarged nodes   No history of splenectomy  PSYCHIATRIC:  No active suicidal or homicidal ideation  ENDOCRINOLOGIC:  No reports of sweating, cold or heat intolerance  No polyuria or polydipsia  ALLERGIES:  No history of asthma, hives, eczema or rhinitis  More than 10 systems reviewed and otherwise noncontributory  Vitals: Blood pressure 123/56, pulse 70, temperature (!) 97 2 °F (36 2 °C), resp  rate 18, height 6' 2" (1 88 m), weight 118 kg (260 lb 2 3 oz), SpO2 97 %  Vitals:    11/19/22 0533 11/20/22 0544   Weight: 119 kg (262 lb 6 4 oz) 118 kg (260 lb 2 3 oz)     Body mass index is 33 4 kg/m²  BP Readings from Last 3 Encounters:   11/20/22 123/56   10/20/22 130/70   10/05/22 114/64     Orthostatic Blood Pressures    Flowsheet Row Most Recent Value   Blood Pressure 123/56 filed at 11/20/2022 1511   Patient Position - Orthostatic VS Lying filed at 11/18/2022 2209        I/O       11/18 0701  11/19 0700 11/19 0701  11/20 0700 11/20 0701  11/21 0700    P  O  1340 1140     I V  (mL/kg) 140 (1 2)      Blood       Total Intake(mL/kg) 1480 (12 4) 1140 (9 7)     Urine (mL/kg/hr) 3355 (1 2) 1625 (0 6) 350 (0 3)    Stool 0      Total Output 3355 1625 350    Net -1875 -485 -350           Unmeasured Stool Occurrence 1 x          Invasive Devices     Peripheral Intravenous Line  Duration           Peripheral IV 11/19/22 Dorsal (posterior); Left Forearm <1 day                  Intake/Output Summary (Last 24 hours) at 11/20/2022 1641  Last data filed at 11/20/2022 1511  Gross per 24 hour   Intake 240 ml   Output 1575 ml   Net -1335 ml     Physical Examination:   Physical Exam  Constitutional:       General: He is not in acute distress  Appearance: He is well-developed and well-nourished  He is ill-appearing  He is not diaphoretic  HENT:      Head: Normocephalic and atraumatic  Right Ear: External ear normal       Left Ear: External ear normal    Eyes:      General: No scleral icterus  Right eye: No discharge           Left eye: No discharge  Conjunctiva/sclera: Conjunctivae normal       Pupils: Pupils are equal, round, and reactive to light  Neck:      Thyroid: No thyromegaly  Vascular: No JVD  Trachea: No tracheal deviation  Cardiovascular:      Rate and Rhythm: Normal rate  Rhythm irregular  Heart sounds: Murmur heard  No friction rub  Gallop present  Pulmonary:      Effort: No respiratory distress  Breath sounds: No stridor  No wheezing or rales  Chest:      Chest wall: No tenderness  Abdominal:      General: There is no distension  Palpations: Abdomen is soft  There is no mass  Tenderness: There is no abdominal tenderness  There is no guarding or rebound  Musculoskeletal:         General: No tenderness, deformity or edema  Normal range of motion  Cervical back: Normal range of motion and neck supple  Skin:     General: Skin is warm and dry  Coloration: Skin is pale  Findings: Erythema present  No rash  Neurological:      Mental Status: He is alert and oriented to person, place, and time  Cranial Nerves: No cranial nerve deficit  Motor: No abnormal muscle tone  Coordination: Coordination normal       Deep Tendon Reflexes: Reflexes are normal and symmetric  Reflexes normal    Psychiatric:         Mood and Affect: Mood and affect normal          Behavior: Behavior normal          Thought Content: Thought content normal          Judgment: Judgment normal          Labs:   Lab Results:  I Have Reviewed All Lab Data Below:  CBC with diff:  Results from last 7 days   Lab Units 11/20/22  0518 11/19/22  0502 11/18/22  1748 11/18/22  0519 11/17/22  1327   WBC Thousand/uL  --  9 65  --  10 28* 11 96*   HEMOGLOBIN g/dL 7 5* 7 2* 7 6* 7 2* 7 1*   HEMATOCRIT % 25 2* 24 2* 25 5* 23 6* 23 8*   MCV fL  --  89  --  86 86   PLATELETS Thousands/uL  --  207  --  162 236   MCH pg  --  26 4*  --  26 2* 25 5*   MCHC g/dL  --  29 8*  --  30 5* 29 8*   RDW %  --  19 0*  --  19 1* 19 4*   MPV fL  --  9 4  --  10 9 8 9   NRBC AUTO /100 WBCs  --   --   --   --  0       CMP:  Results from last 7 days   Lab Units 22  0518 22  0502 22  0519 22  1327   SODIUM mmol/L 140 140 135 131*   POTASSIUM mmol/L 3 2* 3 5 5 2 4 4   CHLORIDE mmol/L 104 104 101 98   CO2 mmol/L 27 26 24 28   ANION GAP mmol/L 9 10 10 5   BUN mg/dL 36* 45* 52* 48*   CREATININE mg/dL 1 84* 1 94* 1 83* 2 00*   CALCIUM mg/dL 8 6 8 2* 8 7 9 1   AST U/L  --  19  --   --    ALT U/L  --  23  --  25   ALK PHOS U/L  --  79  --  81   TOTAL PROTEIN g/dL  --  6 8  --  7 9   ALBUMIN g/dL  --  2 5*  --  3 0*   TOTAL BILIRUBIN mg/dL  --  1 66*  --  1 07*   EGFR ml/min/1 73sq m 33 31 33 30     Magnesium:  Results from last 7 days   Lab Units 22  0519   MAGNESIUM mg/dL 2 6       Coags:  Results from last 7 days   Lab Units 22  1327   PTT seconds 33   INR  1 93*     TSH:    Lipid Profile:    Hgb A1c:    NT-proBNP: No results for input(s): NTBNP in the last 72 hours  Troponins:      Imaging & Testing   I have personally reviewed pertinent reports        Cardiac Testing   Results for orders placed during the hospital encounter of 20    Echo complete with contrast if indicated    Narrative  [address]  [address] [facility] [address]  [phone]    Transthoracic Echocardiogram  2D, M-mode, Doppler, and Color Doppler    Study date:  14-Sep-2020    Patient: Ann Marie Stevenson  MR number: [id number]  Account number: [de-identified]  : 1940  Age: [de-identified] years  Gender: Male  Status: Inpatient  Location: Bedside  Height: 73 in  Weight: 298 3 lb  BP: 120/ 68 mmHg    Diagnoses: I48 0 - Atrial fibrillation    Sonographer:  ROGER Burciaga  Primary Physician:  Marge Cheatham DO  Referring Physician:  David Ibrahim MD  Group:  [facility]  Interpreting Physician:  David Ibrahim MD    SUMMARY    LEFT VENTRICLE:  Systolic function was normal  Ejection fraction was estimated in the range of 55 % to 60 % to be 55 %  Although no diagnostic regional wall motion abnormality was identified, this possibility cannot be completely excluded on the basis of this study  Wall thickness was mildly to moderately increased  There was mild concentric hypertrophy  RIGHT VENTRICLE:  The ventricle was mildly dilated  LEFT ATRIUM:  The atrium was moderately to markedly dilated  RIGHT ATRIUM:  The atrium was mildly to moderately dilated  MITRAL VALVE:  There was moderate annular calcification  There was mild stenosis  Mean Gradient 6-7 mm of hg  There was mild regurgitation  AORTIC VALVE:  The valve was functionally bicuspid  Leaflets exhibited normal thickness and normal cuspal separation  Transaortic velocity was increased due to valvular stenosis  There was moderate to severe stenosis  Shima 1 0 cm2, peak gradient 65, mean 35 mm of hg    TRICUSPID VALVE:  There was mild to moderate regurgitation  Estimated peak PA pressure was 55 to 60 mmHg  The findings suggest moderate pulmonary hypertension  PULMONIC VALVE:  There was trace regurgitation  IVC, HEPATIC VEINS:  The inferior vena cava was mildly dilated  The respirophasic change in diameter was more than 50%  COMPARISONS:  Comparison was made with the previous study of 08-Apr-2019  Aortic stenosis has worsened  Pulmonary artery pressure has increased  HISTORY: PRIOR HISTORY: A  Flutter,A Fib ,chronic kidney disease,gout,heart failure,HTN,Pacemaker,pulmonary emphysema,sleep apnea,Angioplasty with stent placement,CAD,COPD,AAA  PROCEDURE: The procedure was performed at the bedside  This was a routine study  The transthoracic approach was used  The study included complete 2D imaging, M-mode, complete spectral Doppler, and color Doppler  The heart rate was 76 bpm,  at the start of the study  Images were obtained from the parasternal, apical, subcostal, and suprasternal notch acoustic windows  Intravenous contrast ( 0 4ml Definity in NSS) was administered to opacify the left ventricle and enhance  Doppler signals  Echocardiographic views were limited due to poor acoustic window availability, decreased penetration, and lung interference  This was a technically difficult study  LEFT VENTRICLE: Size was normal  Systolic function was normal  Ejection fraction was estimated in the range of 55 % to 60 % to be 55 %  Although no diagnostic regional wall motion abnormality was identified, this possibility cannot be  completely excluded on the basis of this study  Wall thickness was mildly to moderately increased  There was mild concentric hypertrophy  DOPPLER: The study was not technically sufficient to allow evaluation of LV diastolic function  RIGHT VENTRICLE: The ventricle was mildly dilated  Systolic function was normal     LEFT ATRIUM: The atrium was moderately to markedly dilated  RIGHT ATRIUM: The atrium was mildly to moderately dilated  A pacing wire was present  MITRAL VALVE: There was moderate annular calcification  There was mildly reduced leaflet separation  DOPPLER: Transmitral velocity was minimally increased  There was mild stenosis  Mean Gradient 6-7 mm of hg There was mild regurgitation  AORTIC VALVE: The valve was functionally bicuspid  Leaflets exhibited normal thickness and normal cuspal separation  DOPPLER: Transaortic velocity was increased due to valvular stenosis  There was moderate to severe stenosis  Shima 1 0 cm2,  peak gradient 65, mean 35 mm of hg There was no regurgitation  TRICUSPID VALVE: DOPPLER: There was mild to moderate regurgitation  Estimated peak PA pressure was 55 to 60 mmHg  The findings suggest moderate pulmonary hypertension  PULMONIC VALVE: DOPPLER: There was trace regurgitation  PERICARDIUM: There was no thickening or calcification  There was no pericardial effusion  AORTA: The root exhibited normal size      SYSTEMIC VEINS: IVC: The inferior vena cava was mildly dilated  The respirophasic change in diameter was more than 50%  SYSTEM MEASUREMENT TABLES    2D mode  AoR Diam 2D: 3 1 cm  LA Diam (2D): 5 3 cm  LA/Ao (2D): 1 71  FS (2D Teich): 25 7 %  IVSd (2D): 1 3 cm  LVDEV: 130 cmï¾³  LVESV: 64 7 cmï¾³  LVIDd(2D): 5 21 cm  LVISd (2D): 3 87 cm  LVOT Area 2D: 3 46 cmï¾²  LVPWd (2D): 1 33 cm  SV (Teich): 65 3 cmï¾³    Apical four chamber  LVEF A4C: 54 %    Unspecified Scan Mode  FLY Cont Eq (Peak Vitaly): 1 03 cmï¾²  LVOT Diam : 2 1 cm  LVOT Vmax: 1160 mm/s  LVOT Vmax; Mean: 1160 mm/s  Peak Grad ; Mean: 5 mm[Hg]  MV Peak A Vitaly: 434 mm/s  MV Peak E Vitaly  Mean: 1680 mm/s  MVA (PHT): 3 01 cmï¾²  PHT: 73 ms  Max P mm[Hg]  V Max: 2920 mm/s  Vmax: 3410 mm/s  TAPSE: 1 5 cm    [facility] Accredited Echocardiography Laboratory    Prepared and electronically signed by    Aileen Slaughter MD  Signed 14-Sep-2020 13:14:06    EKG: Personally reviewed  Counseling :  Counseling / Coordination of Care Time: 40min  Greater than 50% of total time spent on patient counseling and coordination of care  Code Status: Level 1 - Full Code  Collaboration of Care: Were Recommendations Directly Discussed with Primary Treatment Team? - Yes     Abel Bang MD [facility] [place]    "This note has been constructed using a voice recognition system  Therefore there may be syntax, spelling, and/or grammatical errors   Please call if you have any questions  "

## 2022-11-20 NOTE — ASSESSMENT & PLAN NOTE
Wt Readings from Last 3 Encounters:   11/20/22 118 kg (260 lb 2 3 oz)   10/20/22 120 kg (265 lb)   10/05/22 119 kg (262 lb)     Reported worsening leg swelling compared to baseline along with shortness of breath    ProBNP elevated with chest x-ray showing moderate congestive changes  · Received 1 dose of 60 mg of IV Lasix on admission post blood transfusion  · Was getting 40mg of IV Lasix bid, 1 dose given on 11/19 and IV Lasix discontinued  · Start 40 mg of Demadex starting today per Cardiology  · Monitor I/O and daily weights

## 2022-11-20 NOTE — ASSESSMENT & PLAN NOTE
Per outpatient pulmonary note, patient was started on prednisone taper due to shortness of breath  · Holding off on prednisone in the setting of GI bleed  · Shortness of breath is most likely due to anemia, CHF instead  Doubtful COPD exacerbation  · Continue with fluticasone/vilanterol  · Continue DuoNeb q 6 hours

## 2022-11-21 ENCOUNTER — ANESTHESIA EVENT (INPATIENT)
Dept: PERIOP | Facility: HOSPITAL | Age: 82
End: 2022-11-21

## 2022-11-21 ENCOUNTER — APPOINTMENT (OUTPATIENT)
Dept: PERIOP | Facility: HOSPITAL | Age: 82
End: 2022-11-21

## 2022-11-21 ENCOUNTER — ANESTHESIA (INPATIENT)
Dept: PERIOP | Facility: HOSPITAL | Age: 82
End: 2022-11-21

## 2022-11-21 LAB
ANION GAP SERPL CALCULATED.3IONS-SCNC: 9 MMOL/L (ref 4–13)
BUN SERPL-MCNC: 26 MG/DL (ref 5–25)
CALCIUM SERPL-MCNC: 8.3 MG/DL (ref 8.3–10.1)
CHLORIDE SERPL-SCNC: 103 MMOL/L (ref 96–108)
CO2 SERPL-SCNC: 28 MMOL/L (ref 21–32)
CREAT SERPL-MCNC: 1.49 MG/DL (ref 0.6–1.3)
GFR SERPL CREATININE-BSD FRML MDRD: 43 ML/MIN/1.73SQ M
GLUCOSE SERPL-MCNC: 120 MG/DL (ref 65–140)
HCT VFR BLD AUTO: 25.5 % (ref 36.5–49.3)
HGB BLD-MCNC: 7.5 G/DL (ref 12–17)
POTASSIUM SERPL-SCNC: 3.1 MMOL/L (ref 3.5–5.3)
SODIUM SERPL-SCNC: 140 MMOL/L (ref 135–147)

## 2022-11-21 PROCEDURE — 0DJD8ZZ INSPECTION OF LOWER INTESTINAL TRACT, VIA NATURAL OR ARTIFICIAL OPENING ENDOSCOPIC: ICD-10-PCS | Performed by: INTERNAL MEDICINE

## 2022-11-21 RX ORDER — PROPOFOL 10 MG/ML
INJECTION, EMULSION INTRAVENOUS CONTINUOUS PRN
Status: DISCONTINUED | OUTPATIENT
Start: 2022-11-21 | End: 2022-11-21

## 2022-11-21 RX ORDER — SODIUM CHLORIDE, SODIUM LACTATE, POTASSIUM CHLORIDE, CALCIUM CHLORIDE 600; 310; 30; 20 MG/100ML; MG/100ML; MG/100ML; MG/100ML
INJECTION, SOLUTION INTRAVENOUS CONTINUOUS PRN
Status: DISCONTINUED | OUTPATIENT
Start: 2022-11-21 | End: 2022-11-21

## 2022-11-21 RX ORDER — PROPOFOL 10 MG/ML
INJECTION, EMULSION INTRAVENOUS AS NEEDED
Status: DISCONTINUED | OUTPATIENT
Start: 2022-11-21 | End: 2022-11-21

## 2022-11-21 RX ORDER — POTASSIUM CHLORIDE 20 MEQ/1
40 TABLET, EXTENDED RELEASE ORAL 2 TIMES DAILY
Status: COMPLETED | OUTPATIENT
Start: 2022-11-21 | End: 2022-11-21

## 2022-11-21 RX ORDER — LIDOCAINE HYDROCHLORIDE 10 MG/ML
INJECTION, SOLUTION EPIDURAL; INFILTRATION; INTRACAUDAL; PERINEURAL AS NEEDED
Status: DISCONTINUED | OUTPATIENT
Start: 2022-11-21 | End: 2022-11-21

## 2022-11-21 RX ADMIN — ISOSORBIDE MONONITRATE 30 MG: 30 TABLET, EXTENDED RELEASE ORAL at 09:40

## 2022-11-21 RX ADMIN — NYSTATIN 1 APPLICATION: 100000 POWDER TOPICAL at 09:42

## 2022-11-21 RX ADMIN — FINASTERIDE 5 MG: 5 TABLET, FILM COATED ORAL at 09:41

## 2022-11-21 RX ADMIN — IPRATROPIUM BROMIDE AND ALBUTEROL SULFATE 3 ML: 2.5; .5 SOLUTION RESPIRATORY (INHALATION) at 07:32

## 2022-11-21 RX ADMIN — FLUTICASONE FUROATE AND VILANTEROL TRIFENATATE 1 PUFF: 100; 25 POWDER RESPIRATORY (INHALATION) at 09:41

## 2022-11-21 RX ADMIN — PROPOFOL 80 MCG/KG/MIN: 10 INJECTION, EMULSION INTRAVENOUS at 15:43

## 2022-11-21 RX ADMIN — PRAVASTATIN SODIUM 40 MG: 40 TABLET ORAL at 17:20

## 2022-11-21 RX ADMIN — IPRATROPIUM BROMIDE AND ALBUTEROL SULFATE 3 ML: 2.5; .5 SOLUTION RESPIRATORY (INHALATION) at 01:01

## 2022-11-21 RX ADMIN — PANTOPRAZOLE SODIUM 40 MG: 40 TABLET, DELAYED RELEASE ORAL at 05:15

## 2022-11-21 RX ADMIN — SODIUM CHLORIDE, SODIUM LACTATE, POTASSIUM CHLORIDE, AND CALCIUM CHLORIDE: .6; .31; .03; .02 INJECTION, SOLUTION INTRAVENOUS at 15:39

## 2022-11-21 RX ADMIN — IPRATROPIUM BROMIDE AND ALBUTEROL SULFATE 3 ML: 2.5; .5 SOLUTION RESPIRATORY (INHALATION) at 13:51

## 2022-11-21 RX ADMIN — GABAPENTIN 100 MG: 100 CAPSULE ORAL at 21:32

## 2022-11-21 RX ADMIN — NYSTATIN: 100000 POWDER TOPICAL at 17:21

## 2022-11-21 RX ADMIN — TORSEMIDE 40 MG: 20 TABLET ORAL at 09:41

## 2022-11-21 RX ADMIN — PROPOFOL 50 MG: 10 INJECTION, EMULSION INTRAVENOUS at 15:43

## 2022-11-21 RX ADMIN — ALLOPURINOL 200 MG: 100 TABLET ORAL at 09:41

## 2022-11-21 RX ADMIN — LIDOCAINE HYDROCHLORIDE 50 MG: 10 INJECTION, SOLUTION EPIDURAL; INFILTRATION; INTRACAUDAL; PERINEURAL at 15:43

## 2022-11-21 RX ADMIN — FLUCONAZOLE 100 MG: 100 TABLET ORAL at 09:41

## 2022-11-21 RX ADMIN — PROPOFOL 10 MG: 10 INJECTION, EMULSION INTRAVENOUS at 15:49

## 2022-11-21 RX ADMIN — POTASSIUM CHLORIDE 40 MEQ: 1500 TABLET, EXTENDED RELEASE ORAL at 17:20

## 2022-11-21 RX ADMIN — PROPOFOL 10 MG: 10 INJECTION, EMULSION INTRAVENOUS at 15:47

## 2022-11-21 RX ADMIN — IPRATROPIUM BROMIDE AND ALBUTEROL SULFATE 3 ML: 2.5; .5 SOLUTION RESPIRATORY (INHALATION) at 19:49

## 2022-11-21 RX ADMIN — DULOXETINE HYDROCHLORIDE 60 MG: 60 CAPSULE, DELAYED RELEASE ORAL at 17:21

## 2022-11-21 RX ADMIN — RIVAROXABAN 15 MG: 15 TABLET, FILM COATED ORAL at 17:20

## 2022-11-21 RX ADMIN — DULOXETINE HYDROCHLORIDE 60 MG: 60 CAPSULE, DELAYED RELEASE ORAL at 09:41

## 2022-11-21 RX ADMIN — POTASSIUM CHLORIDE 40 MEQ: 1500 TABLET, EXTENDED RELEASE ORAL at 09:40

## 2022-11-21 NOTE — ASSESSMENT & PLAN NOTE
· Status post stent placement  · Continue Imdur 30mg and Pravachol 40mg  · Continue to hold Plavix even on discharge given risks of bleeding far outweighs the benefits as patient is also on Xarelto  · Patient reports significantly easy bleeding and bruising at home

## 2022-11-21 NOTE — PROGRESS NOTES
Progress Note - Cardiology   [facility]     Dipesh Suarez 80 y o  male MRN: [id number]  : 1940  Unit/Bed#: [address] [place] [address] Encounter: [id number]    Assessment and Plan:   1  Acute on chronic diastolic heart failure:  EF 65% on echo from 2022    -  IV diuretics transition to Demadex 40 mg once day    -  Aldactone currently held due to elevated creatinine    -  monitor I&O, daily weights and labs    2  Anemia/GI bleed:  Hemoglobin 7 5 today  For colonoscopy    3  Coronary artery disease with prior PCI of RCA and LAD:  PCI in 2019    -  patient had been on Plavix 75 mg daily in the outpatient setting, this was discontinued on admission    -  continue Imdur 30 mg daily    4  Moderate to severe aortic stenosis:  Echocardiogram performed in 2022 demonstrated aortic valve leaflets severely calcified with severely reduced mobility and severe stenosis with mean gradient of 41 mm Hg    -  patient would like to follow with [facility]'s valve center for consideration for TAVR    5  Nonischemic myocardial injury secondary to anemia and volume overload:  Peak high sensitivity troponin was 230    -  continue optimal medical therapy    6  Chronic atrial fibrillation/sick sinus syndrome:  Rates controlled  -  Xarelto currently on hold due to anemia    -  patient has [employer] pacemaker which generator was recently changed in 2022    7  Hypokalemia:  Potassium 3 1  Ordered for potassium 40 mEq x2 today    -  monitor labs    8  Candida esophagitis:  Started on Diflucan    Subjective / Objective:   Patient seen and examined  He is resting comfortably in bed  He is for colonoscopy today  Potassium noted to be 3 1  Supplementation has been ordered  Patient denies any shortness breath, or chest pain  He notes he recently had his pacemaker generator change with Dr Betsey Cervantes  He will need follow-up for consideration for TAVR    He states he would like to follow with valvular team at [facility]  Coronary angiogram done in 2019 noted 75-80% tubular disease in proximal RCA and 85% tubular disease in mid LAD for which he underwent stent placement at that time with Dr Sonido Wooten: Blood pressure 123/61, pulse 70, temperature 97 8 °F (36 6 °C), temperature source Oral, resp  rate 18, height 6' 2" (1 88 m), weight 121 kg (265 lb 12 8 oz), SpO2 100 %  Vitals:    11/20/22 0544 11/21/22 0549   Weight: 118 kg (260 lb 2 3 oz) 121 kg (265 lb 12 8 oz)     Body mass index is 34 13 kg/m²  BP Readings from Last 3 Encounters:   11/21/22 123/61   10/20/22 130/70   10/05/22 114/64     Orthostatic Blood Pressures    Flowsheet Row Most Recent Value   Blood Pressure 123/61 filed at 11/21/2022 0739   Patient Position - Orthostatic VS Lying filed at 11/21/2022 0739        I/O       11/19 0701  11/20 0700 11/20 0701  11/21 0700 11/21 0701  11/22 0700    P  O  1140      I V  (mL/kg)       Total Intake(mL/kg) 1140 (9 7)      Urine (mL/kg/hr) 1625 (0 6) 650 (0 2)     Stool       Total Output 1625 650     Net -485 -650            Unmeasured Urine Occurrence  3 x     Unmeasured Stool Occurrence  7 x         Invasive Devices     Peripheral Intravenous Line  Duration           Peripheral IV 11/19/22 Dorsal (posterior); Left Forearm 1 day                  Intake/Output Summary (Last 24 hours) at 11/21/2022 0854  Last data filed at 11/20/2022 1511  Gross per 24 hour   Intake --   Output 350 ml   Net -350 ml         Physical Exam:   Physical Exam  Vitals and nursing note reviewed  Constitutional:       General: He is not in acute distress  Appearance: Normal appearance  He is obese  HENT:      Right Ear: External ear normal       Left Ear: External ear normal       Nose: Nose normal    Eyes:      General: No scleral icterus  Right eye: No discharge  Left eye: No discharge  Cardiovascular:      Rate and Rhythm: Normal rate and regular rhythm  Pulses: Normal pulses  Heart sounds: Murmur heard  Systolic murmur is present with a grade of 2/6  Pulmonary:      Effort: Pulmonary effort is normal  No accessory muscle usage or respiratory distress  Breath sounds: Examination of the right-lower field reveals decreased breath sounds  Examination of the left-lower field reveals decreased breath sounds  Decreased breath sounds present  Abdominal:      General: Bowel sounds are normal  There is no distension  Palpations: Abdomen is soft  Musculoskeletal:      Right lower leg: Edema present  Left lower leg: Edema present  Comments: Trace to +1   Skin:     General: Skin is warm and dry  Capillary Refill: Capillary refill takes less than 2 seconds  Neurological:      General: No focal deficit present  Mental Status: He is alert and oriented to person, place, and time  Mental status is at baseline     Psychiatric:         Mood and Affect: Mood normal             Medications/ Allergies:     Current Facility-Administered Medications   Medication Dose Route Frequency Provider Last Rate   • allopurinol  200 mg Oral Daily Faisal Flores MD     • DULoxetine  60 mg Oral BID Faisal Flores MD     • finasteride  5 mg Oral Daily Faisal Flores MD     • fluconazole  100 mg Oral Daily Faisal Flores MD     • Fluticasone Furoate-Vilanterol  1 puff Inhalation Daily Faisal Flores MD     • gabapentin  100 mg Oral HS Faisal Flores MD     • ipratropium-albuterol  3 mL Nebulization Q6H Faisal Flores MD     • isosorbide mononitrate  30 mg Oral Daily Faisal Flores MD     • nicotine  1 patch Transdermal Daily Faisal Flores MD     • nystatin   Topical BID Lynn Montano DO     • pantoprazole  40 mg Oral Early Morning NAVA Butler     • polyethylene glycol  17 g Oral Daily Faisal Flores MD     • pravastatin  40 mg Oral Daily With Cris Givens MD     • torsemide  40 mg Oral Daily Lynn Montano, DO          No Known Allergies    VTE Pharmacologic Prophylaxis:   Sequential compression device (Venodyne)     Labs:   Troponins:  Results from last 7 days   Lab Units 11/17/22  1917 11/17/22  1519   HSTNI D2 ng/L  --  4   HSTNI D4 ng/L -45  --      CBC with diff:  Results from last 7 days   Lab Units 11/21/22  0544 11/20/22  0518 11/19/22  0502 11/18/22  1748 11/18/22  0519 11/17/22  1327   WBC Thousand/uL  --   --  9 65  --  10 28* 11 96*   HEMOGLOBIN g/dL 7 5* 7 5* 7 2* 7 6* 7 2* 7 1*   HEMATOCRIT % 25 5* 25 2* 24 2* 25 5* 23 6* 23 8*   MCV fL  --   --  89  --  86 86   PLATELETS Thousands/uL  --   --  207  --  162 236   MCH pg  --   --  26 4*  --  26 2* 25 5*   MCHC g/dL  --   --  29 8*  --  30 5* 29 8*   RDW %  --   --  19 0*  --  19 1* 19 4*   MPV fL  --   --  9 4  --  10 9 8 9   NRBC AUTO /100 WBCs  --   --   --   --   --  0     CMP:  Results from last 7 days   Lab Units 11/21/22  0544 11/20/22  0518 11/19/22  0502 11/18/22  0519 11/17/22  1327   SODIUM mmol/L 140 140 140 135 131*   POTASSIUM mmol/L 3 1* 3 2* 3 5 5 2 4 4   CHLORIDE mmol/L 103 104 104 101 98   CO2 mmol/L 28 27 26 24 28   ANION GAP mmol/L 9 9 10 10 5   BUN mg/dL 26* 36* 45* 52* 48*   CREATININE mg/dL 1 49* 1 84* 1 94* 1 83* 2 00*   CALCIUM mg/dL 8 3 8 6 8 2* 8 7 9 1   AST U/L  --   --  19  --   --    ALT U/L  --   --  23  --  25   ALK PHOS U/L  --   --  79  --  81   TOTAL PROTEIN g/dL  --   --  6 8  --  7 9   ALBUMIN g/dL  --   --  2 5*  --  3 0*   TOTAL BILIRUBIN mg/dL  --   --  1 66*  --  1 07*   EGFR ml/min/1 73sq m 43 33 31 33 30     Magnesium:  Results from last 7 days   Lab Units 11/18/22  0519   MAGNESIUM mg/dL 2 6     Coags:  Results from last 7 days   Lab Units 11/17/22  1327   PTT seconds 33   INR  1 93*       Imaging & Testing   I have personally reviewed pertinent reports  EGD    Result Date: 11/18/2022  Narrative: Table formatting from the original result was not included   [facility] [address]  Operating Room [address] [facility] [address] [phone] DATE OF SERVICE: 11/18/22 PHYSICIAN(S): Attending: Jennifer Peterson MD Fellow: No Staff Documented Procedure :  EGD with biopsies INDICATION: Iron deficiency anemia, unspecified iron deficiency anemia type POST-OP DIAGNOSIS: See the impression below  PREPROCEDURE: Informed consent was obtained for the procedure, including sedation  Risks of perforation, hemorrhage, adverse drug reaction and aspiration were discussed  The patient was placed in the left lateral decubitus position  Patient was explained about the risks and benefits of the procedure  Risks including but not limited to bleeding, infection, and perforation were explained in detail  Also explained about less than 100% sensitivity with the exam and other alternatives  DETAILS OF PROCEDURE: Patient was taken to the procedure room where a time out was performed to confirm correct patient and correct procedure  The patient underwent monitored anesthesia care, which was administered by an anesthesia professional  The patient's blood pressure, heart rate, level of consciousness, respirations and oxygen were monitored throughout the procedure  The scope was advanced to the second part of the duodenum  Retroflexion was performed in the fundus  Prior to the procedure, the patient's H  Pylori status was unknown  The patient experienced no blood loss  The procedure was not difficult  The patient tolerated the procedure well  There were no apparent complications   ANESTHESIA INFORMATION: ASA: III Anesthesia Type: IV Sedation with Anesthesia MEDICATIONS: No administrations occurring from 1438 to 1448 on 11/18/22 FINDINGS: Multiple whitish plaque in the upper and middle esophagus suggests evidence of Candida esophagitis, biopsies were done Mild, localized erythematous mucosa in the antrum, consistent with gastritis; performed cold forceps biopsy to rule out H  pylori Single medium diverticulum in the fundus of the stomach; no bleeding was identified The duodenal bulb, 1st part of the duodenum and 2nd part of the duodenum appeared normal  SPECIMENS: ID Type Source Tests Collected by Time Destination 1 : antral bxs Tissue Stomach TISSUE EXAM Ivy Houston MD 11/18/2022  2:45 PM  2 : esophageal bxs- r/o candida Tissue Esophagus TISSUE EXAM Ivy Houston MD 11/18/2022  2:46 PM      Impression: 1  Candida esophagitis 2  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 3  Mild gastritis 4  Normal duodenum 5  No sign of upper GI bleeding RECOMMENDATION:  Await pathology results     Diflucan 100 mg p o  Q day     Pantoprazole 40 mg p o  Q day     Monitor CBC   Jaycob Anup Raymundo MD     CT abdomen pelvis wo contrast    Result Date: 11/20/2022  Narrative: CT ABDOMEN AND PELVIS WITHOUT IV CONTRAST INDICATION:   Anemia Retroperitoneal hematoma suspected anemia, recent fall r/o hematoma  COMPARISON: CT renal stone 4/8/2021, CT abdomen and pelvis 12/8/2012 TECHNIQUE:  CT examination of the abdomen and pelvis was performed without intravenous contrast  Axial, sagittal, and coronal 2D reformatted images were created from the source data and submitted for interpretation  Radiation dose length product (DLP) for this visit:  1173 83 mGy-cm   This examination, like all CT scans performed in the [facility], was performed utilizing techniques to minimize radiation dose exposure, including the use of iterative reconstruction and automated exposure control  Enteric contrast was not administered  FINDINGS: ABDOMEN LOWER CHEST:  Small right and trace left pleural effusions with bibasilar atelectasis are present  LIVER/BILIARY TREE:  Unremarkable  GALLBLADDER:  Gallbladder is surgically absent  SPLEEN:  Unremarkable  PANCREAS:  Unremarkable  ADRENAL GLANDS:  Unremarkable  KIDNEYS/URETERS:  Nonobstructing left lower pole calculus is stable  No hydronephrosis    Simple cyst is present within the left upper pole  STOMACH AND BOWEL:  There is colonic diverticulosis without evidence of acute diverticulitis  APPENDIX:  No findings to suggest appendicitis  ABDOMINOPELVIC CAVITY:  No ascites  No pneumoperitoneum  No lymphadenopathy  VESSELS:  Unremarkable for patient's age  PELVIS REPRODUCTIVE ORGANS:  Unremarkable for patient's age  URINARY BLADDER:  Unremarkable  ABDOMINAL WALL/INGUINAL REGIONS:  There is a small fat-containing umbilical hernia, unchanged from previous examination  OSSEOUS STRUCTURES:  Healing right intertrochanteric fracture status post internal fixation is noted  Alignment is near-anatomic  Impression: No acute abnormality within the abdomen or pelvis  Specifically, no retroperitoneal hematoma identified  Workstation performed: [id number]     XR chest 1 view portable    Result Date: 11/17/2022  Narrative: CHEST INDICATION:   SOB  COMPARISON:  None EXAM PERFORMED/VIEWS:  XR CHEST PORTABLE FINDINGS:  Left-sided chest wall intracardiac device is identified  Leads are intact  Moderate cardiomegaly  Prominent vascular markings  No pneumothorax or pleural effusion  Osseous structures appear within normal limits for patient age  Impression: Cardiomegaly with moderate congestive changes  Workstation performed: [id number]  Cardiology      "This note was completed in part utilizing m-modal fluency direct voice recognition software  Grammatical errors, random word insertion, spelling mistakes, and incomplete sentences may be an occasional consequence of the system secondary to software limitations, ambient noise and hardware issues  Please read the chart carefully and recognize, using context, where substitutions have occurred    If you have any questions or concerns about the context, text or information contained within the body of this dictation, please contact myself, the provider, for further clarification "

## 2022-11-21 NOTE — INTERVAL H&P NOTE
H&P reviewed  After examining the patient I find no changes in the patients condition since the H&P had been written      Vitals:    11/21/22 1433   BP: 140/62   Pulse: 75   Resp: 18   Temp: 97 6 °F (36 4 °C)   SpO2: 93%

## 2022-11-21 NOTE — PROGRESS NOTES
[facility]  Progress Note Sammy Hudson 1940, 80 y o  male MRN: [id number]  Unit/Bed#: [address] [place] [address] Encounter: [id number]  Primary Care Provider: Timothy Mendez DO   Date and time admitted to hospital: 11/17/2022 12:08 PM    * Symptomatic anemia  Assessment & Plan  Patient presented to the ER with shortness of breath and outpatient abnormal lab work done by patient's cardiologist   reports 2-3 "chocolate colored" BMs  · On admission, hemoglobin 7 1  Stool occult positive per ER physician  · Anemia likely multifactorial - iron deficiency, patient with moderate to severe aortic stenosis, CKD, possible GI bleed  · Patient was on iron previously which he stopped taking few months ago which was causing dark/black colored stools  · Status post 1 unit PRBC transfusion  Hemolysis labs were done and negative for hemolysis  · iron panel reviewed - received IV Venofer for 3 days  · As per EGD 11/18, no signs of upper GI bleeding present  Signs of mild gastritis but awaiting results of biopsy  Signs of Candida esophagitis  Started on Diflucan  · Per GI, colonoscopy was negative for bleeding as well  · IV Protonix b i d   · Okay to restart Xarelto per GI  Monitor for any signs of bleeding  Get repeat lab work in a m  Results from last 7 days   Lab Units 11/21/22  0544 11/20/22  0518 11/19/22  0502 11/18/22  1748 11/18/22  0519 11/17/22  1327   HEMOGLOBIN g/dL 7 5* 7 5* 7 2* 7 6* 7 2* 7 1*   HEMATOCRIT % 25 5* 25 2* 24 2* 25 5* 23 6* 23 8*   MCV fL  --   --  89  --  86 86       Acute on chronic diastolic heart failure (HCC)  Assessment & Plan  Wt Readings from Last 3 Encounters:   11/21/22 121 kg (265 lb 12 8 oz)   10/20/22 120 kg (265 lb)   10/05/22 119 kg (262 lb)     Reported worsening leg swelling compared to baseline along with shortness of breath    ProBNP elevated with chest x-ray showing moderate congestive changes  · Received 1 dose of 60 mg of IV Lasix on admission post blood transfusion  · Was getting 40mg of IV Lasix bid, 1 dose given on 11/19 and IV Lasix discontinued  · Started on 40 mg of Demadex  Discussed with patient's nephrologist as well per patient request  · Monitor I/O and daily weights        Elevated troponin  Assessment & Plan  Initial troponin 226 --> 230 --> 181  Likely non MI troponin elevation in the setting of anemia, CHF  Appreciate Cardiology input    Moderate to severe aortic stenosis  Assessment & Plan  Discussed with patient and family - Outpatient evaluation for TAVR    COPD (chronic obstructive pulmonary disease) ([facility] [address] )  Assessment & Plan  Per outpatient pulmonary note, patient was started on prednisone taper due to shortness of breath  · Holding off on prednisone in the setting of GI bleed  · Shortness of breath is most likely due to anemia, CHF instead  Doubtful COPD exacerbation  · Continue with fluticasone/vilanterol  · Continue DuoNeb q 6 hours  Obesity (BMI 30-39  9)  Assessment & Plan  Body mass index is 34 13 kg/m²  Continue with dietary/lifestyle modifications    CAD (coronary artery disease)  Assessment & Plan  · Status post stent placement  · Continue Imdur 30mg and Pravachol 40mg  · Continue to hold Plavix even on discharge given risks of bleeding far outweighs the benefits as patient is also on Xarelto  · Patient reports significantly easy bleeding and bruising at home    Stage 3a chronic kidney disease [facility])  Assessment & Plan  Lab Results   Component Value Date    EGFR 43 11/21/2022    EGFR 33 11/20/2022    EGFR 31 11/19/2022    CREATININE 1 49 (H) 11/21/2022    CREATININE 1 84 (H) 11/20/2022    CREATININE 1 94 (H) 11/19/2022     CKD stage 3 with baseline creatinine 1 3-1 5  Follows up with [facility] as outpatient  · On admission, creatinine of 2, which is likely cardiorenal in nature  · Creatinine trended down and appears at baseline    May need to accept higher baseline to keep patient euvolemic  · Repeat labs in a m         VTE Pharmacologic Prophylaxis: VTE Score: 4 Moderate Risk (Score 3-4) - Pharmacological DVT Prophylaxis Ordered: rivaroxaban (Xarelto)  Patient Centered Rounds: I performed bedside rounds with nursing staff today  Discussions with Specialists or Other Care Team Provider: yes - cardio, GI    Education and Discussions with Family / Patient: Updated  (wife and daughter) at bedside  Time Spent for Care: 35 min  More than 50% of total time spent on counseling and coordination of care as described above  Current Length of Stay: 4 day(s)  Current Patient Status: Inpatient   Certification Statement: The patient will continue to require additional inpatient hospital stay due to Symptomatic anemia status post colonoscopy requiring re-initiation of Xarelto and monitoring lab work overnight  Discharge Plan: Anticipate discharge tomorrow to home  Code Status: Level 1 - Full Code    Subjective:     Patient reports breathing is better  Wants to get home as soon as possible  Per patient and family, patient bleeds excessively at home with any trauma and can bleed for up to 2 days even with a small skin tear    Objective:     Vitals:   Temp (24hrs), Av 8 °F (36 6 °C), Min:97 6 °F (36 4 °C), Max:98 1 °F (36 7 °C)    Temp:  [97 6 °F (36 4 °C)-98 1 °F (36 7 °C)] 97 6 °F (36 4 °C)  HR:  [70-94] 94  Resp:  [17-20] 20  BP: ()/(45-62) 113/53  SpO2:  [92 %-100 %] 94 %  Body mass index is 34 13 kg/m²  Input and Output Summary (last 24 hours): Intake/Output Summary (Last 24 hours) at 2022 1728  Last data filed at 2022 1559  Gross per 24 hour   Intake 200 ml   Output 350 ml   Net -150 ml       Physical Exam:   Physical Exam  Vitals reviewed  Constitutional:       General: He is not in acute distress  Appearance: He is not ill-appearing  HENT:      Head: Normocephalic and atraumatic  Eyes:      General:         Right eye: No discharge           Left eye: No discharge  Cardiovascular:      Rate and Rhythm: Normal rate and regular rhythm  Heart sounds: Murmur heard  Pulmonary:      Effort: Pulmonary effort is normal  No respiratory distress  Breath sounds: No wheezing or rales  Comments: Decreased breath sounds bilaterally  Abdominal:      General: Bowel sounds are normal  There is no distension  Palpations: Abdomen is soft  Tenderness: There is no abdominal tenderness  Musculoskeletal:      Comments: Trace bilateral lower extremity edema   Skin:     Comments: Multiple areas of ecchymosis/skin tears   Neurological:      Mental Status: He is alert and oriented to person, place, and time  Additional Data:     Labs:  Results from last 7 days   Lab Units 11/21/22  0544 11/20/22  0518 11/19/22  0502 11/18/22  0519 11/17/22  1327   WBC Thousand/uL  --   --  9 65   < > 11 96*   HEMOGLOBIN g/dL 7 5*   < > 7 2*   < > 7 1*   HEMATOCRIT % 25 5*   < > 24 2*   < > 23 8*   PLATELETS Thousands/uL  --   --  207   < > 236   NEUTROS PCT %  --   --   --   --  88*   LYMPHS PCT %  --   --   --   --  5*   MONOS PCT %  --   --   --   --  6   EOS PCT %  --   --  1  --  0    < > = values in this interval not displayed  Results from last 7 days   Lab Units 11/21/22 0544 11/20/22  0518 11/19/22  0502   SODIUM mmol/L 140   < > 140   POTASSIUM mmol/L 3 1*   < > 3 5   CHLORIDE mmol/L 103   < > 104   CO2 mmol/L 28   < > 26   BUN mg/dL 26*   < > 45*   CREATININE mg/dL 1 49*   < > 1 94*   ANION GAP mmol/L 9   < > 10   CALCIUM mg/dL 8 3   < > 8 2*   ALBUMIN g/dL  --   --  2 5*   TOTAL BILIRUBIN mg/dL  --   --  1 66*   ALK PHOS U/L  --   --  79   ALT U/L  --   --  23   AST U/L  --   --  19   GLUCOSE RANDOM mg/dL 120   < > 123    < > = values in this interval not displayed       Results from last 7 days   Lab Units 11/17/22  1327   INR  1 93*                   Lines/Drains:  Invasive Devices     Peripheral Intravenous Line  Duration           Peripheral IV 11/19/22 Dorsal (posterior); Left Forearm 1 day                      Imaging: Reviewed radiology reports from this admission including: procedure reports    Recent Cultures (last 7 days):         Last 24 Hours Medication List:   Current Facility-Administered Medications   Medication Dose Route Frequency Provider Last Rate   • allopurinol  200 mg Oral Daily Kyle Hollingsworth MD     • DULoxetine  60 mg Oral BID Kyle Hollingsworth MD     • finasteride  5 mg Oral Daily Kyle Hollingsworth MD     • fluconazole  100 mg Oral Daily Kyle Hollingsworth MD     • Fluticasone Furoate-Vilanterol  1 puff Inhalation Daily Kyle Hollingsworth MD     • gabapentin  100 mg Oral HS Kyle Hollingsworth MD     • ipratropium-albuterol  3 mL Nebulization Q6H Kyle Hollingsworth MD     • isosorbide mononitrate  30 mg Oral Daily Kyle Hollingsworth MD     • nicotine  1 patch Transdermal Daily Kyle Hollingsworth MD     • nystatin   Topical BID Lynn Montano DO     • pantoprazole  40 mg Oral Early Morning NAVA Simons     • polyethylene glycol  17 g Oral Daily Kyle Hollingsworth MD     • pravastatin  40 mg Oral Daily With Janora Heimlich, MD     • rivaroxaban  15 mg Oral Daily With Breakfast Lynn Montano DO     • torsemide  40 mg Oral Daily Leisa Bush DO          Today, Patient Was Seen By: Leisa Bush DO    **Please Note: This note may have been constructed using a voice recognition system  **

## 2022-11-21 NOTE — ASSESSMENT & PLAN NOTE
Wt Readings from Last 3 Encounters:   11/21/22 121 kg (265 lb 12 8 oz)   10/20/22 120 kg (265 lb)   10/05/22 119 kg (262 lb)     Reported worsening leg swelling compared to baseline along with shortness of breath  ProBNP elevated with chest x-ray showing moderate congestive changes  · Received 1 dose of 60 mg of IV Lasix on admission post blood transfusion  · Was getting 40mg of IV Lasix bid, 1 dose given on 11/19 and IV Lasix discontinued  · Started on 40 mg of Demadex    Discussed with patient's nephrologist as well per patient request  · Monitor I/O and daily weights

## 2022-11-21 NOTE — ANESTHESIA PREPROCEDURE EVALUATION
Procedure:  COLONOSCOPY    Relevant Problems   CARDIO   (+) Aneurysm of abdominal aorta   (+) CAD (coronary artery disease)   (+) Chronic a-fib (HCC)   (+) Coronary artery arteriosclerosis   (+) Dyspnea on exertion   (+) Essential hypertension   (+) Hyperlipidemia   (+) Moderate to severe aortic stenosis      GI/HEPATIC   (+) Dysphagia, pharyngoesophageal phase   (+) Gastro-esophageal reflux disease without esophagitis      /RENAL   (+) Benign hypertension with chronic kidney disease, stage III (HCC)   (+) Benign prostatic hyperplasia   (+) Kidney stone on left side   (+) Nephrolithiasis   (+) Stage 3a chronic kidney disease (HCC)      HEMATOLOGY   (+) Iron deficiency anemia   (+) Symptomatic anemia      MUSCULOSKELETAL   (+) Cervical strain   (+) Chronic gout without tophus   (+) Fibromyalgia   (+) Primary osteoarthritis of both knees      NEURO/PSYCH   (+) Chronic pain disorder   (+) Chronic pain syndrome   (+) Depression   (+) Fibromyalgia   (+) JOO (generalized anxiety disorder)   (+) History of falling   (+) Major depressive disorder, recurrent, unspecified (HCC)   (+) Moderate or severe vision impairment, one eye      PULMONARY   (+) COPD (chronic obstructive pulmonary disease) (HCC)   (+) Centrilobular emphysema (HCC)   (+) Dyspnea on exertion   (+) Mixed simple and mucopurulent chronic bronchitis (HCC)   (+) JOVI (obstructive sleep apnea)   (+) Smoker        Physical Exam    Airway    Mallampati score: III  TM Distance: >3 FB  Neck ROM: limited     Dental   No notable dental hx     Cardiovascular      Pulmonary  Decreased breath sounds,     Other Findings        Anesthesia Plan  ASA Score- 3     Anesthesia Type- IV sedation with anesthesia with ASA Monitors  Additional Monitors:   Airway Plan:           Plan Factors-Exercise tolerance (METS): <4 METS  Chart reviewed  EKG reviewed  Existing labs reviewed  Patient summary reviewed  Patient is not a current smoker           Induction- intravenous  Postoperative Plan-     Informed Consent- Anesthetic plan and risks discussed with patient  I personally reviewed this patient with the CRNA  Discussed and agreed on the Anesthesia Plan with the CRNA  Octavia Sifuentes

## 2022-11-21 NOTE — ANESTHESIA POSTPROCEDURE EVALUATION
Post-Op Assessment Note    CV Status:  Stable  Pain Score: 0    Pain management: adequate     Mental Status:  Alert and awake   Hydration Status:  Euvolemic   PONV Controlled:  Controlled   Airway Patency:  Patent      Post Op Vitals Reviewed: Yes      Staff: Anesthesiologist, CRNA         No notable events documented      BP      Temp      Pulse     Resp      SpO2

## 2022-11-21 NOTE — ASSESSMENT & PLAN NOTE
Patient presented to the ER with shortness of breath and outpatient abnormal lab work done by patient's cardiologist   reports 2-3 "chocolate colored" BMs  · On admission, hemoglobin 7 1  Stool occult positive per ER physician  · Anemia likely multifactorial - iron deficiency, patient with moderate to severe aortic stenosis, CKD, possible GI bleed  · Patient was on iron previously which he stopped taking few months ago which was causing dark/black colored stools  · Status post 1 unit PRBC transfusion  Hemolysis labs were done and negative for hemolysis  · iron panel reviewed - received IV Venofer for 3 days  · As per EGD 11/18, no signs of upper GI bleeding present  Signs of mild gastritis but awaiting results of biopsy  Signs of Candida esophagitis  Started on Diflucan  · Per GI, colonoscopy was negative for bleeding as well  · IV Protonix b i d   · Okay to restart Xarelto per GI  Monitor for any signs of bleeding  Get repeat lab work in a m      Results from last 7 days   Lab Units 11/21/22  0544 11/20/22  0518 11/19/22  0502 11/18/22  1748 11/18/22  0519 11/17/22  1327   HEMOGLOBIN g/dL 7 5* 7 5* 7 2* 7 6* 7 2* 7 1*   HEMATOCRIT % 25 5* 25 2* 24 2* 25 5* 23 6* 23 8*   MCV fL  --   --  89  --  86 86

## 2022-11-21 NOTE — PERIOPERATIVE NURSING NOTE
Patient transferred back to room 412, patient assisted to bed,  patient alert and awake, VSS, denies discomfort, report given to receiving RN Jeremiah Cuevas

## 2022-11-21 NOTE — PLAN OF CARE
Problem: MOBILITY - ADULT  Goal: Maintain or return to baseline ADL function  Description: INTERVENTIONS:  -  Assess patient's ability to carry out ADLs; assess patient's baseline for ADL function and identify physical deficits which impact ability to perform ADLs (bathing, care of mouth/teeth, toileting, grooming, dressing, etc )  - Assess/evaluate cause of self-care deficits   - Assess range of motion  - Assess patient's mobility; develop plan if impaired  - Assess patient's need for assistive devices and provide as appropriate  - Encourage maximum independence but intervene and supervise when necessary  - Involve family in performance of ADLs  - Assess for home care needs following discharge   - Consider OT consult to assist with ADL evaluation and planning for discharge  - Provide patient education as appropriate  Outcome: Progressing  Goal: Maintains/Returns to pre admission functional level  Description: INTERVENTIONS:  - Perform BMAT or MOVE assessment daily    - Set and communicate daily mobility goal to care team and patient/family/caregiver  - Collaborate with rehabilitation services on mobility goals if consulted  - Perform Range of Motion 2 times a day  - Reposition patient every 2 hours    - Dangle patient 2 times a day  Problem: Prexisting or High Potential for Compromised Skin Integrity  Goal: Skin integrity is maintained or improved  Description: INTERVENTIONS:  - Identify patients at risk for skin breakdown  - Assess and monitor skin integrity  - Assess and monitor nutrition and hydration status  - Monitor labs   - Assess for incontinence   - Turn and reposition patient  - Assist with mobility/ambulation  - Relieve pressure over bony prominences  - Avoid friction and shearing  - Provide appropriate hygiene as needed including keeping skin clean and dry  - Evaluate need for skin moisturizer/barrier cream  - Collaborate with interdisciplinary team   - Patient/family teaching  - Consider wound care consult   Outcome: Progressing     Problem: Potential for Falls  Goal: Patient will remain free of falls  Description: INTERVENTIONS:  - Educate patient/family on patient safety including physical limitations  - Instruct patient to call for assistance with activity   - Consult OT/PT to assist with strengthening/mobility   - Keep Call bell within reach  - Keep bed low and locked with side rails adjusted as appropriate  - Keep care items and personal belongings within reach  - Initiate and maintain comfort rounds  - Make Fall Risk Sign visible to staff  - Offer Toileting every 2 Hours, in advance of need  - Initiate/Maintain bed alarm  - Obtain necessary fall risk management equipment:   - Apply yellow socks and bracelet for high fall risk patients  - Consider moving patient to room near nurses station  Outcome: Progressing     Problem: RESPIRATORY - ADULT  Goal: Achieves optimal ventilation and oxygenation  Description: INTERVENTIONS:  - Assess for changes in respiratory status  - Assess for changes in mentation and behavior  - Position to facilitate oxygenation and minimize respiratory effort  - Oxygen administered by appropriate delivery if ordered  - Initiate smoking cessation education as indicated  - Encourage broncho-pulmonary hygiene including cough, deep breathe, Incentive Spirometry  - Assess the need for suctioning and aspirate as needed  - Assess and instruct to report SOB or any respiratory difficulty  - Respiratory Therapy support as indicated  Outcome: Progressing     Problem: DISCHARGE PLANNING  Goal: Discharge to home or other facility with appropriate resources  Description: INTERVENTIONS:  - Identify barriers to discharge w/patient and caregiver  - Arrange for needed discharge resources and transportation as appropriate  - Identify discharge learning needs (meds, wound care, etc )  - Arrange for interpretive services to assist at discharge as needed  - Refer to Case Management Department for coordinating discharge planning if the patient needs post-hospital services based on physician/advanced practitioner order or complex needs related to functional status, cognitive ability, or social support system  Outcome: Progressing     Problem: Knowledge Deficit  Goal: Patient/family/caregiver demonstrates understanding of disease process, treatment plan, medications, and discharge instructions  Description: Complete learning assessment and assess knowledge base    Interventions:  - Provide teaching at level of understanding  - Provide teaching via preferred learning methods  Outcome: Progressing     - Stand patient 2 times a day  - Ambulate patient 2 times a day  - Out of bed to chair 3 times a day   - Out of bed for meals 3 times a day  - Out of bed for toileting  - Record patient progress and toleration of activity level   Outcome: Progressing

## 2022-11-21 NOTE — CASE MANAGEMENT
Case Management Assessment & Discharge Planning Note    Patient name Mia Prior  Location [address] 86-* MRN [id number]  : 1940 Date 2022       Current Admission Date: 2022  Current Admission Diagnosis:Symptomatic anemia   Patient Active Problem List    Diagnosis Date Noted   • Elevated troponin 2022   • Left hip pain 2022   • Cervical strain 2022   • Lump of skin of left lower extremity 2022   • Primary osteoarthritis of both knees 2022   • Depression 2022   • Generalized weakness 2022   • Valvular heart disease 2022   • Pressure injury of right buttock, unstageable ([facility] ) 2022   • Ambulatory dysfunction 2022   • Dysphagia, pharyngoesophageal phase 2022   • Gastro-esophageal reflux disease without esophagitis 2022   • Generalized muscle weakness 2022   • History of falling 2022   • Iron deficiency anemia 2022   • Major depressive disorder, recurrent, unspecified ([facility] ) 2022   • Moderate to severe aortic stenosis 2022   • Other abnormalities of gait and mobility 2022   • Other specified polyneuropathies 2022   • Pulmonary hypertension due to left heart disease ([facility] ) 2022   • Presence of coronary angioplasty implant and graft 2022   • Unspecified hearing loss, unspecified ear 2022   • Chronic pain disorder 2022   • Fall 2022   • Closed fracture of right femur ([facility] ) 2022   • COPD (chronic obstructive pulmonary disease) ([facility] ) 2021   • Acute on chronic diastolic heart failure ([facility] ) 2021   • Memory difficulty 2021   • Neuropathy 2021   • Coronary artery arteriosclerosis 2021   • Essential hypertension 2021   • Dyspnea on exertion    • Idiopathic hematuria 2021   • Kidney stone on left side 2021   • Flank pain 2021   • Obesity (BMI 30-39 9) 2021   • Chronic constipation 02/16/2021   • Vitamin D insufficiency 11/13/2020   • Symptomatic anemia 09/13/2020   • Former smoker 01/13/2020   • Nephrolithiasis 10/30/2019   • Bilateral leg edema 09/26/2019   • Hyperuricemia 07/22/2019   • Mixed simple and mucopurulent chronic bronchitis ([facility] ) 07/17/2019   • CAD (coronary artery disease) 05/13/2019   • Status post primary angioplasty with coronary stent 05/13/2019   • Stage 3a chronic kidney disease ([place] [address] ) 04/18/2019   • Serum total bilirubin elevated 04/08/2019   • Peripheral arteriosclerosis ([place] [address] ) 01/03/2019   • Pain in both lower extremities 01/31/2018   • Benign hypertension with chronic kidney disease, stage III ([place] [address] ) 06/27/2017   • JOO (generalized anxiety disorder) 06/07/2016   • Chronic pain syndrome 03/17/2016   • Bilateral lumbar radiculopathy 03/17/2016   • Lumbar canal stenosis 03/17/2016   • Difficulty in walking 09/11/2015   • Persistent proteinuria 01/08/2015   • Urinary incontinence 10/30/2014   • Aneurysm of abdominal aorta 04/07/2014   • Centrilobular emphysema ([place] [address] ) 04/07/2014   • Deep venous insufficiency 04/07/2014   • Hyperlipidemia 04/07/2014   • Pacemaker 04/07/2014   • Fibromyalgia 08/08/2013   • 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/2013   • Fecal soiling 03/26/2013   • Class 2 obesity due to excess calories without serious comorbidity with body mass index (BMI) of 36 0 to 36 9 in adult 03/26/2013   • Chronic a-fib ([place] [address] ) 01/23/2013   • Allergic rhinitis 10/01/2012   • Benign prostatic hyperplasia 09/04/2012   • Carpal tunnel syndrome 09/04/2012   • Moderate or severe vision impairment, one eye 09/04/2012   • Smoker 09/04/2012   • JOVI (obstructive sleep apnea) 09/04/2012   • Venous insufficiency (chronic) (peripheral) 09/04/2012      LOS (days): 4  Geometric Mean LOS (GMLOS) (days): 3 60  Days to GMLOS:-0 5     OBJECTIVE:    Risk of Unplanned Readmission Score: 23 45       Current admission status: Inpatient  Referral Reason: Other (Discharge planning)    Preferred Pharmacy:   [facility] [address], [phone] [facility] [address]  [address]  [address]  [place] [address]  Phone: [phone] Fax: [phone]    [facility] [address] [place] [facility], [address] [facility] [place] [facility] [address]  [address]  [place] [address]  Phone: [phone] Fax: [phone]    [address], [address]  [address] [facility] [address]  Phone: [phone] Fax: [phone]    [facility] [address] [facility], [facility] [address] [place] [address]  Phone: [phone] Fax: [phone]    Primary Care Provider: Piotr Golden DO    Primary Insurance: MEDICARE  Secondary Insurance: COMMERCIAL MISCELLANEOUS    ASSESSMENT:  [address] [facility] , [address] [employer] Representative - Spouse   Primary Phone: [phone] (Home)                Readmission Root Cause  30 Day Readmission: No    Patient Information  Admitted from[de-identified] Home  Mental Status: Alert  During Assessment patient was accompanied by: Not accompanied during assessment  Assessment information provided by[de-identified] Patient  Primary Caregiver: Self  Support Systems: Spouse/significant other, Daughter, Family members  [place] of Residence: [address] [place] [address] do you live in?: [place] [address] entry access options   Select all that apply : Stairs  Number of steps to enter home : 2  Type of Current Residence: [address] [place] home  Upon entering residence, is there a bedroom on the main floor (no further steps)?: No  A bedroom is located on the following floor levels of residence (select all that apply):: 2nd Floor  Upon entering residence, is there a bathroom on the main floor (no further steps)?: Yes  Number of steps to 2nd floor from main floor: One Flight  In the last 12 months, was there a time when you were not able to pay the mortgage or rent on time?: No  In the last 12 months, how many places have you lived?: 1  In the last 12 months, was there a time when you did not have a steady place to sleep or slept in a shelter (including now)?: No  Homeless/housing insecurity resource given?: N/A  Living Arrangements: Lives w/ Spouse/significant other, Lives w/ Daughter    Activities of Daily Living Prior to Admission  Functional Status: Independent  Completes ADLs independently?: Yes  Ambulates independently?: Yes  Does patient use assisted devices?: Yes  Assisted Devices (DME) used: Cleavon Gone, Wheelchair, Rollator, BiPAP  Does patient currently own DME?: Yes  What DME does the patient currently own?: BiPAP, Rollator, Walker, Wheelchair  Does the patient have a history of Short-Term Rehab?: Yes ([address] [place] [address], [address] [place] [address] )  Does patient have a history of HHC?: Yes (Counts include 234 beds at the [facility])  Does patient currently have [facility]?: No    Patient Information Continued  Income Source: Pension/[employer]  Within the past 12 months, you worried that your food would run out before you got the money to buy more : Never true  Within the past 12 months, the food you bought just didn't last and you didn't have money to get more : Never true  Food insecurity resource given?: N/A  Does patient receive dialysis treatments?: No  Does patient have a history of substance abuse?: No  Does patient have a history of Mental Health Diagnosis?: No    Means of Transportation  Means of Transport to Appts[de-identified] Drives Self  In the past 12 months, has lack of transportation kept you from medical appointments or from getting medications?: No  In the past 12 months, has lack of transportation kept you from meetings, work, or from getting things needed for daily living?: No  Was application for public transport provided?: N/A    DISCHARGE DETAILS:    Discharge planning discussed with[de-identified] Patient and wife Enzo [place] of Choice: Yes  Comments - Freedom of Choice: Patient's preference is to return home at discharge  CM contacted family/caregiver?: Yes  Were Treatment Team discharge recommendations reviewed with patient/caregiver?: Yes  Did patient/caregiver verbalize understanding of patient care needs?: Yes  Were patient/caregiver advised of the risks associated with not following Treatment Team discharge recommendations?: Yes    Contacts  Patient Contacts: Rene Ponce (wife)  Relationship to Patient[de-identified] Family  Contact Method: Phone  Phone Number: [phone]  Reason/Outcome: Emergency Contact, Discharge [address]         Is the patient interested in [facility] at discharge?: No    DME Referral Provided  Referral made for DME?: No    Other Referral/Resources/Interventions Provided:  Interventions: None Indicated    Would you like to participate in our [address] service program?  : No - Declined    Treatment Team Recommendation: Home  Discharge Destination Plan[de-identified] Home  Transport at Discharge : Family      IMM Given (Date):: 11/21/22  IMM Given to[de-identified] Patient (IMM reviewed with and signed by patient    Copy given to patient and copy placed in scan bin for chart )

## 2022-11-21 NOTE — ASSESSMENT & PLAN NOTE
Lab Results   Component Value Date    EGFR 43 11/21/2022    EGFR 33 11/20/2022    EGFR 31 11/19/2022    CREATININE 1 49 (H) 11/21/2022    CREATININE 1 84 (H) 11/20/2022    CREATININE 1 94 (H) 11/19/2022     CKD stage 3 with baseline creatinine 1 3-1 5  Follows up with [facility] as outpatient  · On admission, creatinine of 2, which is likely cardiorenal in nature  · Creatinine trended down and appears at baseline  May need to accept higher baseline to keep patient euvolemic  · Repeat labs in a m

## 2022-11-21 NOTE — PLAN OF CARE
Problem: MOBILITY - ADULT  Goal: Maintain or return to baseline ADL function  Description: INTERVENTIONS:  -  Assess patient's ability to carry out ADLs; assess patient's baseline for ADL function and identify physical deficits which impact ability to perform ADLs (bathing, care of mouth/teeth, toileting, grooming, dressing, etc )  - Assess/evaluate cause of self-care deficits   - Assess range of motion  - Assess patient's mobility; develop plan if impaired  - Assess patient's need for assistive devices and provide as appropriate  - Encourage maximum independence but intervene and supervise when necessary  - Involve family in performance of ADLs  - Assess for home care needs following discharge   - Consider OT consult to assist with ADL evaluation and planning for discharge  - Provide patient education as appropriate  Outcome: Progressing  Goal: Maintains/Returns to pre admission functional level  Description: INTERVENTIONS:  - Perform BMAT or MOVE assessment daily    - Set and communicate daily mobility goal to care team and patient/family/caregiver  - Collaborate with rehabilitation services on mobility goals if consulted  - Perform Range of Motion 2 times a day  - Reposition patient every 2 hours    - Dangle patient 2 times a day  - Stand patient 2 times a day  - Ambulate patient 2 times a day  - Out of bed to chair 3 times a day   - Out of bed for meals 3 times a day  - Out of bed for toileting  - Record patient progress and toleration of activity level   Outcome: Progressing

## 2022-11-22 ENCOUNTER — TRANSITIONAL CARE MANAGEMENT (OUTPATIENT)
Dept: FAMILY MEDICINE CLINIC | Facility: CLINIC | Age: 82
End: 2022-11-22

## 2022-11-22 ENCOUNTER — TELEPHONE (OUTPATIENT)
Dept: CARDIAC SURGERY | Facility: CLINIC | Age: 82
End: 2022-11-22

## 2022-11-22 VITALS
BODY MASS INDEX: 34.09 KG/M2 | TEMPERATURE: 97.6 F | DIASTOLIC BLOOD PRESSURE: 64 MMHG | OXYGEN SATURATION: 98 % | SYSTOLIC BLOOD PRESSURE: 119 MMHG | HEIGHT: 74 IN | WEIGHT: 265.65 LBS | HEART RATE: 70 BPM | RESPIRATION RATE: 23 BRPM

## 2022-11-22 LAB
ANION GAP SERPL CALCULATED.3IONS-SCNC: 10 MMOL/L (ref 4–13)
BUN SERPL-MCNC: 24 MG/DL (ref 5–25)
CALCIUM SERPL-MCNC: 8.5 MG/DL (ref 8.3–10.1)
CHLORIDE SERPL-SCNC: 102 MMOL/L (ref 96–108)
CO2 SERPL-SCNC: 26 MMOL/L (ref 21–32)
CREAT SERPL-MCNC: 1.63 MG/DL (ref 0.6–1.3)
ERYTHROCYTE [DISTWIDTH] IN BLOOD BY AUTOMATED COUNT: 21.9 % (ref 11.6–15.1)
GFR SERPL CREATININE-BSD FRML MDRD: 38 ML/MIN/1.73SQ M
GLUCOSE SERPL-MCNC: 132 MG/DL (ref 65–140)
HCT VFR BLD AUTO: 26.1 % (ref 36.5–49.3)
HGB BLD-MCNC: 7.6 G/DL (ref 12–17)
MCH RBC QN AUTO: 26.1 PG (ref 26.8–34.3)
MCHC RBC AUTO-ENTMCNC: 29.1 G/DL (ref 31.4–37.4)
MCV RBC AUTO: 90 FL (ref 82–98)
PLATELET # BLD AUTO: 197 THOUSANDS/UL (ref 149–390)
PMV BLD AUTO: 8.8 FL (ref 8.9–12.7)
POTASSIUM SERPL-SCNC: 3.8 MMOL/L (ref 3.5–5.3)
RBC # BLD AUTO: 2.91 MILLION/UL (ref 3.88–5.62)
SODIUM SERPL-SCNC: 138 MMOL/L (ref 135–147)
WBC # BLD AUTO: 8.57 THOUSAND/UL (ref 4.31–10.16)

## 2022-11-22 RX ORDER — PANTOPRAZOLE SODIUM 40 MG/1
40 TABLET, DELAYED RELEASE ORAL
Qty: 30 TABLET | Refills: 0 | Status: SHIPPED | OUTPATIENT
Start: 2022-11-23

## 2022-11-22 RX ORDER — FLUCONAZOLE 100 MG/1
100 TABLET ORAL DAILY
Qty: 3 TABLET | Refills: 0 | Status: SHIPPED | OUTPATIENT
Start: 2022-11-23 | End: 2022-11-26

## 2022-11-22 RX ORDER — NYSTATIN 100000 [USP'U]/G
POWDER TOPICAL 2 TIMES DAILY
Qty: 15 G | Refills: 0 | Status: SHIPPED | OUTPATIENT
Start: 2022-11-22

## 2022-11-22 RX ADMIN — FLUCONAZOLE 100 MG: 100 TABLET ORAL at 08:51

## 2022-11-22 RX ADMIN — PANTOPRAZOLE SODIUM 40 MG: 40 TABLET, DELAYED RELEASE ORAL at 05:38

## 2022-11-22 RX ADMIN — ISOSORBIDE MONONITRATE 30 MG: 30 TABLET, EXTENDED RELEASE ORAL at 08:51

## 2022-11-22 RX ADMIN — FLUTICASONE FUROATE AND VILANTEROL TRIFENATATE 1 PUFF: 100; 25 POWDER RESPIRATORY (INHALATION) at 08:50

## 2022-11-22 RX ADMIN — ALLOPURINOL 200 MG: 100 TABLET ORAL at 08:50

## 2022-11-22 RX ADMIN — TORSEMIDE 40 MG: 20 TABLET ORAL at 08:50

## 2022-11-22 RX ADMIN — IPRATROPIUM BROMIDE AND ALBUTEROL SULFATE 3 ML: 2.5; .5 SOLUTION RESPIRATORY (INHALATION) at 07:28

## 2022-11-22 RX ADMIN — DULOXETINE HYDROCHLORIDE 60 MG: 60 CAPSULE, DELAYED RELEASE ORAL at 08:50

## 2022-11-22 RX ADMIN — RIVAROXABAN 15 MG: 15 TABLET, FILM COATED ORAL at 08:50

## 2022-11-22 RX ADMIN — FINASTERIDE 5 MG: 5 TABLET, FILM COATED ORAL at 08:51

## 2022-11-22 RX ADMIN — IPRATROPIUM BROMIDE AND ALBUTEROL SULFATE 3 ML: 2.5; .5 SOLUTION RESPIRATORY (INHALATION) at 02:44

## 2022-11-22 NOTE — TELEPHONE ENCOUNTER
Called patient to schedule consultation to discuss heart surgery  He stated that he would like to speak with his regular cardiologist, Dr Indiana Harding, and will call our office if he wants to schedule an appt  I gave him my name and office number if he wishes to schedule

## 2022-11-22 NOTE — PLAN OF CARE
Problem: MOBILITY - ADULT  Goal: Maintain or return to baseline ADL function  Description: INTERVENTIONS:  -  Assess patient's ability to carry out ADLs; assess patient's baseline for ADL function and identify physical deficits which impact ability to perform ADLs (bathing, care of mouth/teeth, toileting, grooming, dressing, etc )  - Assess/evaluate cause of self-care deficits   - Assess range of motion  - Assess patient's mobility; develop plan if impaired  - Assess patient's need for assistive devices and provide as appropriate  - Encourage maximum independence but intervene and supervise when necessary  - Involve family in performance of ADLs  - Assess for home care needs following discharge   - Consider OT consult to assist with ADL evaluation and planning for discharge  - Provide patient education as appropriate  11/22/2022 1048 by Elba Smart RN  Outcome: Completed  11/22/2022 0710 by Elba Smart RN  Outcome: Progressing  Goal: Maintains/Returns to pre admission functional level  Description: INTERVENTIONS:  - Perform BMAT or MOVE assessment daily    - Set and communicate daily mobility goal to care team and patient/family/caregiver  - Collaborate with rehabilitation services on mobility goals if consulted  - Perform Range of Motion 2 times a day  - Reposition patient every 2 hours    - Dangle patient 2 times a day  - Stand patient 2 times a day  - Ambulate patient 2 times a day  - Out of bed to chair 3 times a day   - Out of bed for meals 3 times a day  - Out of bed for toileting  - Record patient progress and toleration of activity level   11/22/2022 1048 by Elba Smart RN  Outcome: Completed  11/22/2022 0710 by Elba Smart RN  Outcome: Progressing     Problem: Prexisting or High Potential for Compromised Skin Integrity  Goal: Skin integrity is maintained or improved  Description: INTERVENTIONS:  - Identify patients at risk for skin breakdown  - Assess and monitor skin integrity  - Assess and monitor nutrition and hydration status  - Monitor labs   - Assess for incontinence   - Turn and reposition patient  - Assist with mobility/ambulation  - Relieve pressure over bony prominences  - Avoid friction and shearing  - Provide appropriate hygiene as needed including keeping skin clean and dry  - Evaluate need for skin moisturizer/barrier cream  - Collaborate with interdisciplinary team   - Patient/family teaching  - Consider wound care consult   11/22/2022 1048 by Alex Skinner RN  Outcome: Completed  11/22/2022 0710 by Alex Skinner RN  Outcome: Progressing     Problem: Potential for Falls  Goal: Patient will remain free of falls  Description: INTERVENTIONS:  - Educate patient/family on patient safety including physical limitations  - Instruct patient to call for assistance with activity   - Consult OT/PT to assist with strengthening/mobility   - Keep Call bell within reach  - Keep bed low and locked with side rails adjusted as appropriate  - Keep care items and personal belongings within reach  - Initiate and maintain comfort rounds  - Make Fall Risk Sign visible to staff  - Offer Toileting every 2 Hours, in advance of need  - Initiate/Maintain bed alarm  - Obtain necessary fall risk management equipment:   - Apply yellow socks and bracelet for high fall risk patients  - Consider moving patient to room near nurses station  11/22/2022 1048 by Alex Skinner RN  Outcome: Completed  11/22/2022 0710 by Alex Skinner RN  Outcome: Progressing     Problem: RESPIRATORY - ADULT  Goal: Achieves optimal ventilation and oxygenation  Description: INTERVENTIONS:  - Assess for changes in respiratory status  - Assess for changes in mentation and behavior  - Position to facilitate oxygenation and minimize respiratory effort  - Oxygen administered by appropriate delivery if ordered  - Initiate smoking cessation education as indicated  - Encourage broncho-pulmonary hygiene including cough, deep breathe, Incentive Spirometry  - Assess the need for suctioning and aspirate as needed  - Assess and instruct to report SOB or any respiratory difficulty  - Respiratory Therapy support as indicated  11/22/2022 1048 by Nery Rosenberg RN  Outcome: Completed  11/22/2022 0710 by Nery Rosenberg RN  Outcome: Progressing     Problem: DISCHARGE PLANNING  Goal: Discharge to home or other facility with appropriate resources  Description: INTERVENTIONS:  - Identify barriers to discharge w/patient and caregiver  - Arrange for needed discharge resources and transportation as appropriate  - Identify discharge learning needs (meds, wound care, etc )  - Arrange for interpretive services to assist at discharge as needed  - Refer to Case Management Department for coordinating discharge planning if the patient needs post-hospital services based on physician/advanced practitioner order or complex needs related to functional status, cognitive ability, or social support system  11/22/2022 1048 by Nery Rosenberg RN  Outcome: Completed  11/22/2022 0710 by Nery Rosenberg RN  Outcome: Progressing     Problem: Knowledge Deficit  Goal: Patient/family/caregiver demonstrates understanding of disease process, treatment plan, medications, and discharge instructions  Description: Complete learning assessment and assess knowledge base    Interventions:  - Provide teaching at level of understanding  - Provide teaching via preferred learning methods  11/22/2022 1048 by Nery Rosenberg RN  Outcome: Completed  11/22/2022 0710 by Nery Rosenberg RN  Outcome: Progressing

## 2022-11-22 NOTE — ASSESSMENT & PLAN NOTE
Wt Readings from Last 3 Encounters:   11/22/22 121 kg (265 lb 10 5 oz)   10/20/22 120 kg (265 lb)   10/05/22 119 kg (262 lb)     Reported worsening leg swelling compared to baseline along with shortness of breath  ProBNP elevated with chest x-ray showing moderate congestive changes  · Received 1 dose of 60 mg of IV Lasix on admission post blood transfusion  · Was getting 40mg of IV Lasix bid, 1 dose given on 11/19 and IV Lasix discontinued  · Patient started on Demadex 40 milligram p o  Daily    Prior provided discussed the coordination of care with patient's nephrologist too  · Monitor I/O and daily weights

## 2022-11-22 NOTE — PROGRESS NOTES
Progress Note - Cardiology   [facility]     Michelle Parson 80 y o  male MRN: [id number]  : 1940  Unit/Bed#: [address] [place] [address] Encounter: [id number]    Assessment and Plan:   1  Acute on chronic diastolic heart failure:  EF 65% on echo from 2022    -  IV diuretics transition to Demadex 40 mg once day    -  Aldactone currently held due to elevated creatinine    -  monitor I&O, daily weights and labs    -  BMP in 1 week in outpatient setting     2  Anemia/GI bleed:  Hemoglobin 7 5 today      -  gastritis seen on EGD    -  colonoscopy performed 2022 was negative    -  recommendation for outpatient capsule endoscopy     3  Coronary artery disease with prior PCI of RCA and LAD:  PCI in 2019    -  patient had been on Plavix 75 mg daily in the outpatient setting, this was discontinued on admission and would not resume due to patient's significant anemia    -  continue Imdur 30 mg daily     4  Moderate to severe aortic stenosis:  Echocardiogram performed in 2022 demonstrated aortic valve leaflets severely calcified with severely reduced mobility and severe stenosis with mean gradient of 41 mm Hg    -  patient would like to follow with [facility]'s valve center for consideration for TAVR     5  Nonischemic myocardial injury secondary to anemia and volume overload:  Peak high sensitivity troponin was 230    -  continue optimal medical therapy     6  Chronic atrial fibrillation/sick sinus syndrome:  Rates controlled  -  Xarelto currently on hold due to anemia    -  patient has [employer] pacemaker which generator was recently changed     7  Candida esophagitis:  Started on Diflucan    Patient appears to be stable from a cardiac standpoint  May discharge when cleared by primary team     Subjective / Objective:   Patient seen and examined  He underwent colonoscopy yesterday which did not demonstrate any bleeding  His Xarelto was resumed last evening    Due to excessive bleeding and iron deficiency anemia would discontinue Plavix and continue his Xarelto  Vitals: Blood pressure 119/64, pulse 70, temperature 97 6 °F (36 4 °C), resp  rate (!) 23, height 6' 2" (1 88 m), weight 121 kg (265 lb 10 5 oz), SpO2 98 %  Vitals:    11/21/22 0549 11/22/22 0600   Weight: 121 kg (265 lb 12 8 oz) 121 kg (265 lb 10 5 oz)     Body mass index is 34 11 kg/m²  BP Readings from Last 3 Encounters:   11/22/22 119/64   10/20/22 130/70   10/05/22 114/64     Orthostatic Blood Pressures    Flowsheet Row Most Recent Value   Blood Pressure 119/64 filed at 11/22/2022 0741   Patient Position - Orthostatic VS Lying filed at 11/21/2022 1621        I/O       11/20 0701  11/21 0700 11/21 0701  11/22 0700 11/22 0701  11/23 0700    P  O        I V  (mL/kg)  200 (1 7)     Total Intake(mL/kg)  200 (1 7)     Urine (mL/kg/hr) 650 (0 2) 650 (0 2)     Blood  0     Total Output 650 650     Net -650 -450            Unmeasured Urine Occurrence 3 x      Unmeasured Stool Occurrence 7 x          Invasive Devices     Peripheral Intravenous Line  Duration           Peripheral IV 11/19/22 Dorsal (posterior); Left Forearm 2 days                  Intake/Output Summary (Last 24 hours) at 11/22/2022 8313  Last data filed at 11/22/2022 0101  Gross per 24 hour   Intake 200 ml   Output 650 ml   Net -450 ml         Physical Exam:   Physical Exam  Vitals and nursing note reviewed  Constitutional:       General: He is not in acute distress  Appearance: Normal appearance  He is obese  HENT:      Right Ear: External ear normal       Left Ear: External ear normal       Nose: Nose normal    Eyes:      General: No scleral icterus  Right eye: No discharge  Left eye: No discharge  Cardiovascular:      Rate and Rhythm: Normal rate and regular rhythm  Pulses: Normal pulses  Heart sounds: Murmur heard  Systolic murmur is present with a grade of 2/6    Pulmonary:      Effort: Pulmonary effort is normal  No accessory muscle usage or respiratory distress  Breath sounds: Examination of the right-lower field reveals decreased breath sounds  Examination of the left-lower field reveals decreased breath sounds  Decreased breath sounds present  Abdominal:      General: Bowel sounds are normal  There is no distension  Palpations: Abdomen is soft  Musculoskeletal:      Right lower leg: Edema present  Left lower leg: Edema present  Comments: Chronic venous stasis changes   Skin:     General: Skin is warm and dry  Capillary Refill: Capillary refill takes less than 2 seconds  Neurological:      General: No focal deficit present  Mental Status: He is alert and oriented to person, place, and time  Mental status is at baseline     Psychiatric:         Mood and Affect: Mood normal             Medications/ Allergies:     Current Facility-Administered Medications   Medication Dose Route Frequency Provider Last Rate   • allopurinol  200 mg Oral Daily Maria Victoria Lawler MD     • DULoxetine  60 mg Oral BID Maria Victoria Lawler MD     • finasteride  5 mg Oral Daily Maria Victoria Lawler MD     • fluconazole  100 mg Oral Daily Maria Victoria Lawler MD     • Fluticasone Furoate-Vilanterol  1 puff Inhalation Daily Maria Victoria Lawler MD     • gabapentin  100 mg Oral HS Maria Victoria Lawler MD     • ipratropium-albuterol  3 mL Nebulization Q6H Maria Victoria Lawler MD     • isosorbide mononitrate  30 mg Oral Daily Maria Victoria Lawler MD     • nicotine  1 patch Transdermal Daily Maria Victoria Lawler MD     • nystatin   Topical BID Lynn Montano, DO     • pantoprazole  40 mg Oral Early Morning Thersa Other NAVA Lema     • polyethylene glycol  17 g Oral Daily Maria Victoria Lawler MD     • pravastatin  40 mg Oral Daily With Alyssa Guerra MD     • rivaroxaban  15 mg Oral Daily With Breakfast Lynn Montano DO     • torsemide  40 mg Oral Daily Lynn Montano, DO          No Known Allergies    VTE Pharmacologic Prophylaxis:   Sequential compression device (Venodyne)     Labs:   Troponins:  Results from last 7 days   Lab Units 11/17/22  1917 11/17/22  1519   HSTNI D2 ng/L  --  4   HSTNI D4 ng/L -45  --      CBC with diff:  Results from last 7 days   Lab Units 11/22/22  0631 11/21/22  0544 11/20/22  0518 11/19/22  0502 11/18/22  1748 11/18/22  0519 11/17/22  1327   WBC Thousand/uL 8 57  --   --  9 65  --  10 28* 11 96*   HEMOGLOBIN g/dL 7 6* 7 5* 7 5* 7 2* 7 6* 7 2* 7 1*   HEMATOCRIT % 26 1* 25 5* 25 2* 24 2* 25 5* 23 6* 23 8*   MCV fL 90  --   --  89  --  86 86   PLATELETS Thousands/uL 197  --   --  207  --  162 236   MCH pg 26 1*  --   --  26 4*  --  26 2* 25 5*   MCHC g/dL 29 1*  --   --  29 8*  --  30 5* 29 8*   RDW % 21 9*  --   --  19 0*  --  19 1* 19 4*   MPV fL 8 8*  --   --  9 4  --  10 9 8 9   NRBC AUTO /100 WBCs  --   --   --   --   --   --  0     CMP:  Results from last 7 days   Lab Units 11/22/22  0631 11/21/22  0544 11/20/22  0518 11/19/22  0502 11/18/22  0519 11/17/22  1327   SODIUM mmol/L 138 140 140 140 135 131*   POTASSIUM mmol/L 3 8 3 1* 3 2* 3 5 5 2 4 4   CHLORIDE mmol/L 102 103 104 104 101 98   CO2 mmol/L 26 28 27 26 24 28   ANION GAP mmol/L 10 9 9 10 10 5   BUN mg/dL 24 26* 36* 45* 52* 48*   CREATININE mg/dL 1 63* 1 49* 1 84* 1 94* 1 83* 2 00*   CALCIUM mg/dL 8 5 8 3 8 6 8 2* 8 7 9 1   AST U/L  --   --   --  19  --   --    ALT U/L  --   --   --  23  --  25   ALK PHOS U/L  --   --   --  79  --  81   TOTAL PROTEIN g/dL  --   --   --  6 8  --  7 9   ALBUMIN g/dL  --   --   --  2 5*  --  3 0*   TOTAL BILIRUBIN mg/dL  --   --   --  1 66*  --  1 07*   EGFR ml/min/1 73sq m 38 43 33 31 33 30     Magnesium:  Results from last 7 days   Lab Units 11/18/22  0519   MAGNESIUM mg/dL 2 6     Coags:  Results from last 7 days   Lab Units 11/17/22  1327   PTT seconds 33   INR  1 93*       Imaging & Testing   I have personally reviewed pertinent reports      EGD    Result Date: 11/18/2022  Narrative: Table formatting from the original result was not included  [facility] Operating Room [address] [facility] [address] [phone] DATE OF SERVICE: 11/18/22 PHYSICIAN(S): Attending: Faisal Flores MD Fellow: No Staff Documented Procedure :  EGD with biopsies INDICATION: Iron deficiency anemia, unspecified iron deficiency anemia type POST-OP DIAGNOSIS: See the impression below  PREPROCEDURE: Informed consent was obtained for the procedure, including sedation  Risks of perforation, hemorrhage, adverse drug reaction and aspiration were discussed  The patient was placed in the left lateral decubitus position  Patient was explained about the risks and benefits of the procedure  Risks including but not limited to bleeding, infection, and perforation were explained in detail  Also explained about less than 100% sensitivity with the exam and other alternatives  DETAILS OF PROCEDURE: Patient was taken to the procedure room where a time out was performed to confirm correct patient and correct procedure  The patient underwent monitored anesthesia care, which was administered by an anesthesia professional  The patient's blood pressure, heart rate, level of consciousness, respirations and oxygen were monitored throughout the procedure  The scope was advanced to the second part of the duodenum  Retroflexion was performed in the fundus  Prior to the procedure, the patient's H  Pylori status was unknown  The patient experienced no blood loss  The procedure was not difficult  The patient tolerated the procedure well  There were no apparent complications   ANESTHESIA INFORMATION: ASA: III Anesthesia Type: IV Sedation with Anesthesia MEDICATIONS: No administrations occurring from 1438 to 1448 on 11/18/22 FINDINGS: Multiple whitish plaque in the upper and middle esophagus suggests evidence of Candida esophagitis, biopsies were done Mild, localized erythematous mucosa in the antrum, consistent with gastritis; performed cold forceps biopsy to rule out H  pylori Single medium diverticulum in the fundus of the stomach; no bleeding was identified The duodenal bulb, 1st part of the duodenum and 2nd part of the duodenum appeared normal  SPECIMENS: ID Type Source Tests Collected by Time Destination 1 : antral bxs Tissue Stomach TISSUE EXAM Jina Davison MD 11/18/2022  2:45 PM  2 : esophageal bxs- r/o candida Tissue Esophagus TISSUE EXAM Jina Davison MD 11/18/2022  2:46 PM      Impression: 1  Candida esophagitis 2  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 3  Mild gastritis 4  Normal duodenum 5  No sign of upper GI bleeding RECOMMENDATION:  Await pathology results     Diflucan 100 mg p o  Q day     Pantoprazole 40 mg p o  Q day     Monitor CBC   Jina Davison MD     Colonoscopy    Result Date: 11/21/2022  Narrative: Table formatting from the original result was not included  [address] [place] [address] Operating Room [address] [place] [address] [phone] DATE OF SERVICE: 11/21/22 PHYSICIAN(S): Attending: Jerry Holman MD Fellow: No Staff Documented INDICATION: Iron deficiency anemia, unspecified iron deficiency anemia type POST-OP DIAGNOSIS: See the impression below  HISTORY: Prior colonoscopy: More than 10 years ago  BOWEL PREPARATION: Golytely/Colyte/Trilyte PREPROCEDURE: Informed consent was obtained for the procedure, including sedation  Risks including but not limited to bleeding, infection, perforation, adverse drug reaction and aspiration were explained in detail  Also explained about less than 100% sensitivity with the exam and other alternatives  The patient was placed in the left lateral decubitus position  DETAILS OF PROCEDURE: Patient was taken to the procedure room where a time out was performed to confirm correct patient and correct procedure   The patient underwent monitored anesthesia care, which was administered by an anesthesia professional  The patient's blood pressure, heart rate, level of consciousness, oxygen and respirations were monitored throughout the procedure  A digital rectal exam was performed  The scope was introduced through the anus and advanced to the cecum  Retroflexion was performed in the rectum  The quality of bowel preparation was evaluated using the [facility] Bowel Preparation Scale with scores of: right colon = 1, transverse colon = 1, left colon = 1  The total BBPS score was 3  Bowel prep was not adequate  The patient experienced no blood loss  The procedure was not difficult  The patient tolerated the procedure well  There were no apparent complications  ANESTHESIA INFORMATION: ASA: III Anesthesia Type: IV Sedation with Anesthesia MEDICATIONS: No administrations occurring from 1533 to 1707 on 11/21/22 FINDINGS: All observed locations appeared normal  EVENTS: Procedure Events Event Event Time ENDO CECUM REACHED 11/21/2022  3:51 PM ENDO SCOPE OUT TIME 11/21/2022  3:58 PM SPECIMENS: * No specimens in log * EQUIPMENT: Colonoscope -     Impression: No blood or bleeding source identified  RECOMMENDATION:  No further screening colonoscopies necessary  Age greater than 72   Consider outpatient capsule endoscopy May restart Kobe Nova MD     CT abdomen pelvis wo contrast    Result Date: 11/20/2022  Narrative: CT ABDOMEN AND PELVIS WITHOUT IV CONTRAST INDICATION:   Anemia Retroperitoneal hematoma suspected anemia, recent fall r/o hematoma  COMPARISON: CT renal stone 4/8/2021, CT abdomen and pelvis 12/8/2012 TECHNIQUE:  CT examination of the abdomen and pelvis was performed without intravenous contrast  Axial, sagittal, and coronal 2D reformatted images were created from the source data and submitted for interpretation  Radiation dose length product (DLP) for this visit:  1173 83 mGy-cm     This examination, like all CT scans performed in the [facility], was performed utilizing techniques to minimize radiation dose exposure, including the use of iterative reconstruction and automated exposure control  Enteric contrast was not administered  FINDINGS: ABDOMEN LOWER CHEST:  Small right and trace left pleural effusions with bibasilar atelectasis are present  LIVER/BILIARY TREE:  Unremarkable  GALLBLADDER:  Gallbladder is surgically absent  SPLEEN:  Unremarkable  PANCREAS:  Unremarkable  ADRENAL GLANDS:  Unremarkable  KIDNEYS/URETERS:  Nonobstructing left lower pole calculus is stable  No hydronephrosis  Simple cyst is present within the left upper pole  STOMACH AND BOWEL:  There is colonic diverticulosis without evidence of acute diverticulitis  APPENDIX:  No findings to suggest appendicitis  ABDOMINOPELVIC CAVITY:  No ascites  No pneumoperitoneum  No lymphadenopathy  VESSELS:  Unremarkable for patient's age  PELVIS REPRODUCTIVE ORGANS:  Unremarkable for patient's age  URINARY BLADDER:  Unremarkable  ABDOMINAL WALL/INGUINAL REGIONS:  There is a small fat-containing umbilical hernia, unchanged from previous examination  OSSEOUS STRUCTURES:  Healing right intertrochanteric fracture status post internal fixation is noted  Alignment is near-anatomic  Impression: No acute abnormality within the abdomen or pelvis  Specifically, no retroperitoneal hematoma identified  Workstation performed: [id number]     XR chest 1 view portable    Result Date: 11/17/2022  Narrative: CHEST INDICATION:   SOB  COMPARISON:  None EXAM PERFORMED/VIEWS:  XR CHEST PORTABLE FINDINGS:  Left-sided chest wall intracardiac device is identified  Leads are intact  Moderate cardiomegaly  Prominent vascular markings  No pneumothorax or pleural effusion  Osseous structures appear within normal limits for patient age  Impression: Cardiomegaly with moderate congestive changes   Workstation performed: [address] [place]  Cardiology      "This note was completed in part utilizing m-modal fluency direct voice recognition software  Grammatical errors, random word insertion, spelling mistakes, and incomplete sentences may be an occasional consequence of the system secondary to software limitations, ambient noise and hardware issues  Please read the chart carefully and recognize, using context, where substitutions have occurred    If you have any questions or concerns about the context, text or information contained within the body of this dictation, please contact myself, the provider, for further clarification "

## 2022-11-22 NOTE — ASSESSMENT & PLAN NOTE
Lab Results   Component Value Date    EGFR 38 11/22/2022    EGFR 43 11/21/2022    EGFR 33 11/20/2022    CREATININE 1 63 (H) 11/22/2022    CREATININE 1 49 (H) 11/21/2022    CREATININE 1 84 (H) 11/20/2022     CKD stage 3 with baseline creatinine 1 3-1 5    Follows up with [address] [place] [address] Nephrology as outpatient  · On admission, creatinine of 2, which is likely cardiorenal in nature  · Might need accept higher creatinine to keep the patient euvolemic  · Follow-up BMP in 1 week on torsemide

## 2022-11-22 NOTE — ASSESSMENT & PLAN NOTE
Per outpatient pulmonary note, patient was started on prednisone taper due to shortness of breath  · Holding off on prednisone in the setting of GI bleed  · Shortness of breath is most likely due to anemia, CHF instead    Doubtful COPD exacerbation  · Continue with trelegy Ellipta and nebulizers

## 2022-11-22 NOTE — PLAN OF CARE
Problem: MOBILITY - ADULT  Goal: Maintain or return to baseline ADL function  Description: INTERVENTIONS:  -  Assess patient's ability to carry out ADLs; assess patient's baseline for ADL function and identify physical deficits which impact ability to perform ADLs (bathing, care of mouth/teeth, toileting, grooming, dressing, etc )  - Assess/evaluate cause of self-care deficits   - Assess range of motion  - Assess patient's mobility; develop plan if impaired  - Assess patient's need for assistive devices and provide as appropriate  - Encourage maximum independence but intervene and supervise when necessary  - Involve family in performance of ADLs  - Assess for home care needs following discharge   - Consider OT consult to assist with ADL evaluation and planning for discharge  - Provide patient education as appropriate  Outcome: Progressing  Goal: Maintains/Returns to pre admission functional level  Description: INTERVENTIONS:  - Perform BMAT or MOVE assessment daily    - Set and communicate daily mobility goal to care team and patient/family/caregiver  - Collaborate with rehabilitation services on mobility goals if consulted  - Perform Range of Motion 2 times a day  - Reposition patient every 2 hours    - Dangle patient 2 times a day  - Stand patient 2 times a day  - Ambulate patient 2 times a day  - Out of bed to chair 3 times a day   - Out of bed for meals 3 times a day  - Out of bed for toileting  - Record patient progress and toleration of activity level   Outcome: Progressing     Problem: Prexisting or High Potential for Compromised Skin Integrity  Goal: Skin integrity is maintained or improved  Description: INTERVENTIONS:  - Identify patients at risk for skin breakdown  - Assess and monitor skin integrity  - Assess and monitor nutrition and hydration status  - Monitor labs   - Assess for incontinence   - Turn and reposition patient  - Assist with mobility/ambulation  - Relieve pressure over bony prominences  - Avoid friction and shearing  - Provide appropriate hygiene as needed including keeping skin clean and dry  - Evaluate need for skin moisturizer/barrier cream  - Collaborate with interdisciplinary team   - Patient/family teaching  - Consider wound care consult   Outcome: Progressing     Problem: Potential for Falls  Goal: Patient will remain free of falls  Description: INTERVENTIONS:  - Educate patient/family on patient safety including physical limitations  - Instruct patient to call for assistance with activity   - Consult OT/PT to assist with strengthening/mobility   - Keep Call bell within reach  - Keep bed low and locked with side rails adjusted as appropriate  - Keep care items and personal belongings within reach  - Initiate and maintain comfort rounds  - Make Fall Risk Sign visible to staff  - Offer Toileting every 2 Hours, in advance of need  - Initiate/Maintain bed alarm  - Obtain necessary fall risk management equipment:   - Apply yellow socks and bracelet for high fall risk patients  - Consider moving patient to room near nurses station  Outcome: Progressing     Problem: RESPIRATORY - ADULT  Goal: Achieves optimal ventilation and oxygenation  Description: INTERVENTIONS:  - Assess for changes in respiratory status  - Assess for changes in mentation and behavior  - Position to facilitate oxygenation and minimize respiratory effort  - Oxygen administered by appropriate delivery if ordered  - Initiate smoking cessation education as indicated  - Encourage broncho-pulmonary hygiene including cough, deep breathe, Incentive Spirometry  - Assess the need for suctioning and aspirate as needed  - Assess and instruct to report SOB or any respiratory difficulty  - Respiratory Therapy support as indicated  Outcome: Progressing     Problem: DISCHARGE PLANNING  Goal: Discharge to home or other facility with appropriate resources  Description: INTERVENTIONS:  - Identify barriers to discharge w/patient and caregiver  - Arrange for needed discharge resources and transportation as appropriate  - Identify discharge learning needs (meds, wound care, etc )  - Arrange for interpretive services to assist at discharge as needed  - Refer to Case Management Department for coordinating discharge planning if the patient needs post-hospital services based on physician/advanced practitioner order or complex needs related to functional status, cognitive ability, or social support system  Outcome: Progressing

## 2022-11-23 NOTE — ASSESSMENT & PLAN NOTE
Patient presented to the ER with shortness of breath and outpatient abnormal lab work done by patient's cardiologist   reports 2-3 "chocolate colored" BMs  · On admission, hemoglobin 7 1  Stool occult positive per ER physician  · Anemia likely multifactorial - iron deficiency, patient with moderate to severe aortic stenosis, CKD, possible GI bleed  · Patient was on iron previously which he stopped taking few months ago which was causing dark/black colored stools  · Status post 1 unit PRBC transfusion  Hemolysis labs were done and negative for hemolysis  · iron panel reviewed - received IV Venofer for 3 days  · As per EGD 11/18, no signs of upper GI bleeding present  Signs of mild gastritis but awaiting results of biopsy  Signs of Candida esophagitis  Patient was on Diflucan and will be discharged on Diflucan to finish the course  Colonoscopy also showed no evidence of bleeding  · Patient was on Protonix 40 milligram b i d   During hospitalization  · Restarted on Xarelto and hemoglobin continues to remain stable  · Outpatient follow-up with GI for capsule endoscopy    Results from last 7 days   Lab Units 11/22/22  0631 11/21/22  0544 11/20/22  0518 11/19/22  0502 11/18/22  1748 11/18/22  0519 11/17/22  1327   HEMOGLOBIN g/dL 7 6* 7 5* 7 5* 7 2* 7 6* 7 2* 7 1*   HEMATOCRIT % 26 1* 25 5* 25 2* 24 2* 25 5* 23 6* 23 8*   MCV fL 90  --   --  89  --  86 86

## 2022-11-23 NOTE — DISCHARGE SUMMARY
Mandy 45  Discharge- Poli Salts 1940, 80 y o  male MRN: [id number]  Unit/Bed#: [address] [place] [address] Encounter: [id number]  Primary Care Provider: Ky Quintana,    Date and time admitted to hospital: 11/17/2022 12:08 PM    Acute on chronic diastolic heart failure [facility])  Assessment & Plan  Wt Readings from Last 3 Encounters:   11/22/22 121 kg (265 lb 10 5 oz)   10/20/22 120 kg (265 lb)   10/05/22 119 kg (262 lb)     Reported worsening leg swelling compared to baseline along with shortness of breath  ProBNP elevated with chest x-ray showing moderate congestive changes  · Received 1 dose of 60 mg of IV Lasix on admission post blood transfusion  · Was getting 40mg of IV Lasix bid, 1 dose given on 11/19 and IV Lasix discontinued  · Patient started on Demadex 40 milligram p o  Daily  Prior provided discussed the coordination of care with patient's nephrologist too  · Monitor I/O and daily weights        * Symptomatic anemia  Assessment & Plan  Patient presented to the ER with shortness of breath and outpatient abnormal lab work done by patient's cardiologist   reports 2-3 "chocolate colored" BMs  · On admission, hemoglobin 7 1  Stool occult positive per ER physician  · Anemia likely multifactorial - iron deficiency, patient with moderate to severe aortic stenosis, CKD, possible GI bleed  · Patient was on iron previously which he stopped taking few months ago which was causing dark/black colored stools  · Status post 1 unit PRBC transfusion  Hemolysis labs were done and negative for hemolysis  · iron panel reviewed - received IV Venofer for 3 days  · As per EGD 11/18, no signs of upper GI bleeding present  Signs of mild gastritis but awaiting results of biopsy  Signs of Candida esophagitis  Patient was on Diflucan and will be discharged on Diflucan to finish the course  Colonoscopy also showed no evidence of bleeding  · Patient was on Protonix 40 milligram b i d   During hospitalization  · Restarted on Xarelto and hemoglobin continues to remain stable  · Outpatient follow-up with GI for capsule endoscopy    Results from last 7 days   Lab Units 11/22/22  0631 11/21/22  0544 11/20/22  0518 11/19/22  0502 11/18/22  1748 11/18/22  0519 11/17/22  1327   HEMOGLOBIN g/dL 7 6* 7 5* 7 5* 7 2* 7 6* 7 2* 7 1*   HEMATOCRIT % 26 1* 25 5* 25 2* 24 2* 25 5* 23 6* 23 8*   MCV fL 90  --   --  89  --  86 86       Elevated troponin  Assessment & Plan  Initial troponin 226 --> 230 --> 181  Likely non MI troponin elevation in the setting of anemia, CHF  Appreciate Cardiology input    Moderate to severe aortic stenosis  Assessment & Plan  Prior provider Discussed with patient and family - Outpatient evaluation for TAVR    COPD (chronic obstructive pulmonary disease) ([facility] [address] )  Assessment & Plan  Per outpatient pulmonary note, patient was started on prednisone taper due to shortness of breath  · Holding off on prednisone in the setting of GI bleed  · Shortness of breath is most likely due to anemia, CHF instead  Doubtful COPD exacerbation  · Continue with trelegy Ellipta and nebulizers    Obesity (BMI 30-39  9)  Assessment & Plan  Body mass index is 34 11 kg/m²  Continue with dietary/lifestyle modifications    CAD (coronary artery disease)  Assessment & Plan  · Status post stent placement  · Continue Imdur 30mg and Pravachol 40mg  · Continue to hold Plavix even on discharge given risks of bleeding far outweighs the benefits as patient is also on Xarelto  · Patient reports significantly easy bleeding and bruising at home    Stage 3a chronic kidney disease [facility])  Assessment & Plan  Lab Results   Component Value Date    EGFR 38 11/22/2022    EGFR 43 11/21/2022    EGFR 33 11/20/2022    CREATININE 1 63 (H) 11/22/2022    CREATININE 1 49 (H) 11/21/2022    CREATININE 1 84 (H) 11/20/2022     CKD stage 3 with baseline creatinine 1 3-1 5    Follows up with [facility] as outpatient  · On admission, creatinine of 2, which is likely cardiorenal in nature  · Might need accept higher creatinine to keep the patient euvolemic  · Follow-up BMP in 1 week on torsemide      Medical Problems     Resolved Problems  Date Reviewed: 11/22/2022   None       Discharging Physician / Practitioner: Henok Valdez MD  PCP: Good Stokes DO  Admission Date:   Admission Orders (From admission, onward)     Ordered        11/17/22 1519  1 [facility]  Once                      Discharge Date: 11/22/22    Consultations During Hospital Stay:  · Cardiology    Procedures Performed:   · EGD showed Candida esophagitis with gastric diverticulum with mild gastritis  · Colonoscopy showed no blood or bleeding source identified       Outpatient Tests Requested:  · CBC and BMP in 1 week    Complications:  None    Reason for Admission:  Abnormal blood work and shortness of breath    Hospital Course:   Matt Mcclellan is a 80 y o  male patient with past medical history of atrial flutter, CKD stage 3, CAD status post PCI, heart failure, pacemaker, hypertension, emphysema who originally presented to the hospital on 11/17/2022 due to abnormal labs  Patient also reported some shortness of breath with cough for 2 days and has been on prednisone taper as outpatient  In the ED patient's workup showed anemia with hemoglobin level of 7 1 with guaiac-positive stool  Patient received 1 unit of PRBC transfusion and was seen by GI  Patient underwent EGD which showed Candida esophagitis with gastritis without any evidence of active bleeding  Patient was also treated with IV Lasix for CHF exacerbation  Patient was seen by Cardiology  Patient had very good diuresis with IV Lasix and hemoglobin continued remained stable  Eventually patient underwent colonoscopy which also did not show any evidence of bleeding  Hemoglobin continued remained stable after starting Xarelto    Cardiology recommended stopping Plavix S patient is PCI was long time ago   Patient will follow-up with GI as outpatient for capsule endoscopy      Please see above list of diagnoses and related plan for additional information  Condition at Discharge: stable    Discharge Day Visit / Exam:   Subjective:  Patient is feeling better and is anxious to go home  Vitals: Blood Pressure: 119/64 (11/22/22 0741)  Pulse: 70 (11/22/22 0741)  Temperature: 97 6 °F (36 4 °C) (11/22/22 0741)  Temp Source: Temporal (11/21/22 1433)  Respirations: (!) 23 (11/22/22 0245)  Height: 6' 2" (188 cm) (11/17/22 1702)  Weight - Scale: 121 kg (265 lb 10 5 oz) (11/22/22 0600)  SpO2: 98 % (11/22/22 0741)  Exam:   Physical Exam  Constitutional:       Appearance: Normal appearance  HENT:      Head: Normocephalic and atraumatic  Eyes:      Extraocular Movements: Extraocular movements intact  Pupils: Pupils are equal, round, and reactive to light  Cardiovascular:      Rate and Rhythm: Normal rate and regular rhythm  Heart sounds: Murmur heard  No gallop  Pulmonary:      Effort: Pulmonary effort is normal       Breath sounds: Rhonchi present  Comments: Few scattered rhonchi  Abdominal:      General: Bowel sounds are normal       Palpations: Abdomen is soft  Tenderness: There is no abdominal tenderness  Musculoskeletal:         General: No swelling or deformity  Normal range of motion  Cervical back: Normal range of motion and neck supple  Skin:     General: Skin is warm and dry  Neurological:      General: No focal deficit present  Mental Status: He is alert  Discharge instructions/Information to patient and family:   See after visit summary for information provided to patient and family  Provisions for Follow-Up Care:  See after visit summary for information related to follow-up care and any pertinent home health orders  Disposition:   Home    Planned Readmission:  No     Discharge Statement:  I spent 35 minutes discharging the patient   This time was spent on the day of discharge  I had direct contact with the patient on the day of discharge  Greater than 50% of the total time was spent examining patient, answering all patient questions, arranging and discussing plan of care with patient as well as directly providing post-discharge instructions  Additional time then spent on discharge activities  Discharge Medications:  See after visit summary for reconciled discharge medications provided to patient and/or family        **Please Note: This note may have been constructed using a voice recognition system**

## 2022-12-05 ENCOUNTER — HOSPITAL ENCOUNTER (INPATIENT)
Facility: HOSPITAL | Age: 82
LOS: 14 days | Discharge: NURSING, REHAB OR LONG-TERM CARE | End: 2022-12-20
Attending: EMERGENCY MEDICINE | Admitting: INTERNAL MEDICINE

## 2022-12-05 ENCOUNTER — APPOINTMENT (EMERGENCY)
Dept: RADIOLOGY | Facility: HOSPITAL | Age: 82
End: 2022-12-05

## 2022-12-05 DIAGNOSIS — R93.89 ABNORMAL CAT SCAN: ICD-10-CM

## 2022-12-05 DIAGNOSIS — K59.00 CONSTIPATION: ICD-10-CM

## 2022-12-05 DIAGNOSIS — R77.8 ELEVATED TROPONIN: ICD-10-CM

## 2022-12-05 DIAGNOSIS — G93.40 ACUTE ENCEPHALOPATHY: ICD-10-CM

## 2022-12-05 DIAGNOSIS — S16.1XXA STRAIN OF NECK MUSCLE, INITIAL ENCOUNTER: ICD-10-CM

## 2022-12-05 DIAGNOSIS — J43.2 CENTRILOBULAR EMPHYSEMA (HCC): Chronic | ICD-10-CM

## 2022-12-05 DIAGNOSIS — B95.61 MSSA BACTEREMIA: ICD-10-CM

## 2022-12-05 DIAGNOSIS — R41.82 ALTERED MENTAL STATUS: Primary | ICD-10-CM

## 2022-12-05 DIAGNOSIS — A41.9 SEPSIS (HCC): ICD-10-CM

## 2022-12-05 DIAGNOSIS — E87.1 HYPONATREMIA: ICD-10-CM

## 2022-12-05 DIAGNOSIS — R78.81 MSSA BACTEREMIA: ICD-10-CM

## 2022-12-05 DIAGNOSIS — R78.81 GRAM-POSITIVE BACTEREMIA: ICD-10-CM

## 2022-12-05 DIAGNOSIS — C79.9 METASTATIC DISEASE (HCC): ICD-10-CM

## 2022-12-05 DIAGNOSIS — R53.1 GENERALIZED WEAKNESS: ICD-10-CM

## 2022-12-05 LAB
2HR DELTA HS TROPONIN: -27 NG/L
4HR DELTA HS TROPONIN: 29 NG/L
ALBUMIN SERPL BCP-MCNC: 2.6 G/DL (ref 3.5–5)
ALP SERPL-CCNC: 85 U/L (ref 46–116)
ALT SERPL W P-5'-P-CCNC: 23 U/L (ref 12–78)
ANION GAP SERPL CALCULATED.3IONS-SCNC: 10 MMOL/L (ref 4–13)
APTT PPP: 32 SECONDS (ref 23–37)
AST SERPL W P-5'-P-CCNC: 33 U/L (ref 5–45)
BASOPHILS # BLD MANUAL: 0 THOUSAND/UL (ref 0–0.1)
BASOPHILS NFR MAR MANUAL: 0 % (ref 0–1)
BILIRUB SERPL-MCNC: 1.7 MG/DL (ref 0.2–1)
BUN SERPL-MCNC: 32 MG/DL (ref 5–25)
CALCIUM ALBUM COR SERPL-MCNC: 9.7 MG/DL (ref 8.3–10.1)
CALCIUM SERPL-MCNC: 8.6 MG/DL (ref 8.3–10.1)
CARDIAC TROPONIN I PNL SERPL HS: 298 NG/L
CARDIAC TROPONIN I PNL SERPL HS: 325 NG/L
CARDIAC TROPONIN I PNL SERPL HS: 354 NG/L
CHLORIDE SERPL-SCNC: 96 MMOL/L (ref 96–108)
CO2 SERPL-SCNC: 29 MMOL/L (ref 21–32)
CREAT SERPL-MCNC: 1.65 MG/DL (ref 0.6–1.3)
EOSINOPHIL # BLD MANUAL: 0 THOUSAND/UL (ref 0–0.4)
EOSINOPHIL NFR BLD MANUAL: 0 % (ref 0–6)
ERYTHROCYTE [DISTWIDTH] IN BLOOD BY AUTOMATED COUNT: 21.8 % (ref 11.6–15.1)
FLUAV RNA RESP QL NAA+PROBE: NEGATIVE
FLUBV RNA RESP QL NAA+PROBE: NEGATIVE
GFR SERPL CREATININE-BSD FRML MDRD: 38 ML/MIN/1.73SQ M
GLUCOSE SERPL-MCNC: 140 MG/DL (ref 65–140)
HCT VFR BLD AUTO: 30.7 % (ref 36.5–49.3)
HGB BLD-MCNC: 9.1 G/DL (ref 12–17)
INR PPP: 1.74 (ref 0.84–1.19)
LACTATE SERPL-SCNC: 1.8 MMOL/L (ref 0.5–2)
LYMPHOCYTES # BLD AUTO: 0.46 THOUSAND/UL (ref 0.6–4.47)
LYMPHOCYTES # BLD AUTO: 3 % (ref 14–44)
MACROCYTES BLD QL AUTO: PRESENT
MCH RBC QN AUTO: 26.7 PG (ref 26.8–34.3)
MCHC RBC AUTO-ENTMCNC: 29.6 G/DL (ref 31.4–37.4)
MCV RBC AUTO: 90 FL (ref 82–98)
MONOCYTES # BLD AUTO: 0.15 THOUSAND/UL (ref 0–1.22)
MONOCYTES NFR BLD: 1 % (ref 4–12)
MYELOCYTES NFR BLD MANUAL: 1 % (ref 0–1)
NEUTROPHILS # BLD MANUAL: 14.61 THOUSAND/UL (ref 1.85–7.62)
NEUTS SEG NFR BLD AUTO: 95 % (ref 43–75)
PLATELET # BLD AUTO: 187 THOUSANDS/UL (ref 149–390)
PLATELET BLD QL SMEAR: ADEQUATE
PMV BLD AUTO: 9.5 FL (ref 8.9–12.7)
POLYCHROMASIA BLD QL SMEAR: PRESENT
POTASSIUM SERPL-SCNC: 3.7 MMOL/L (ref 3.5–5.3)
PROT SERPL-MCNC: 7.3 G/DL (ref 6.4–8.4)
PROTHROMBIN TIME: 20.4 SECONDS (ref 11.6–14.5)
RBC # BLD AUTO: 3.41 MILLION/UL (ref 3.88–5.62)
RSV RNA RESP QL NAA+PROBE: NEGATIVE
SARS-COV-2 RNA RESP QL NAA+PROBE: NEGATIVE
SODIUM SERPL-SCNC: 135 MMOL/L (ref 135–147)
WBC # BLD AUTO: 15.38 THOUSAND/UL (ref 4.31–10.16)

## 2022-12-06 ENCOUNTER — APPOINTMENT (OUTPATIENT)
Dept: NON INVASIVE DIAGNOSTICS | Facility: HOSPITAL | Age: 82
End: 2022-12-06

## 2022-12-06 ENCOUNTER — APPOINTMENT (OUTPATIENT)
Dept: RADIOLOGY | Facility: HOSPITAL | Age: 82
End: 2022-12-06

## 2022-12-06 PROBLEM — D64.9 ANEMIA: Status: ACTIVE | Noted: 2022-12-06

## 2022-12-06 PROBLEM — I50.32 CHRONIC DIASTOLIC HEART FAILURE (HCC): Status: ACTIVE | Noted: 2021-11-21

## 2022-12-06 PROBLEM — C79.9 METASTATIC DISEASE (HCC): Status: ACTIVE | Noted: 2022-12-06

## 2022-12-06 PROBLEM — F41.8 DEPRESSION WITH ANXIETY: Status: ACTIVE | Noted: 2022-01-01

## 2022-12-06 PROBLEM — D72.829 LEUKOCYTOSIS: Status: ACTIVE | Noted: 2022-01-01

## 2022-12-06 PROBLEM — G93.40 ACUTE ENCEPHALOPATHY: Status: ACTIVE | Noted: 2022-01-01

## 2022-12-06 PROBLEM — A41.9 SEPSIS (HCC): Status: ACTIVE | Noted: 2022-01-01

## 2022-12-06 PROBLEM — R40.0 SOMNOLENCE: Status: ACTIVE | Noted: 2022-01-01

## 2022-12-06 LAB
AMORPH URATE CRY URNS QL MICRO: ABNORMAL /HPF
ANION GAP SERPL CALCULATED.3IONS-SCNC: 8 MMOL/L (ref 4–13)
ANISOCYTOSIS BLD QL SMEAR: PRESENT
AORTIC ROOT: 3.7 CM
AORTIC VALVE MEAN VELOCITY: 25.7 M/S
APICAL FOUR CHAMBER EJECTION FRACTION: 45 %
AV AREA BY CONTINUOUS VTI: 1.3 CM2
AV AREA PEAK VELOCITY: 1.1 CM2
AV LVOT MEAN GRADIENT: 4 MMHG
AV LVOT PEAK GRADIENT: 6 MMHG
AV MEAN GRADIENT: 33 MMHG
AV PEAK GRADIENT: 61 MMHG
AV VALVE AREA: 1.25 CM2
AV VELOCITY RATIO: 0.32
BACTERIA UR QL AUTO: ABNORMAL /HPF
BASE EXCESS BLDA CALC-SCNC: 4.3 MMOL/L
BASOPHILS # BLD AUTO: 0.01 THOUSANDS/ÂΜL (ref 0–0.1)
BASOPHILS # BLD MANUAL: 0 THOUSAND/UL (ref 0–0.1)
BASOPHILS NFR BLD AUTO: 0 % (ref 0–1)
BASOPHILS NFR MAR MANUAL: 0 % (ref 0–1)
BILIRUB UR QL STRIP: NEGATIVE
BODY TEMPERATURE: 98.9 DEGREES FEHRENHEIT
BUN SERPL-MCNC: 29 MG/DL (ref 5–25)
CALCIUM SERPL-MCNC: 8.5 MG/DL (ref 8.3–10.1)
CARDIAC TROPONIN I PNL SERPL HS: 763 NG/L (ref 8–18)
CHLORIDE SERPL-SCNC: 96 MMOL/L (ref 96–108)
CHOLEST SERPL-MCNC: 84 MG/DL
CLARITY UR: CLEAR
CO2 SERPL-SCNC: 29 MMOL/L (ref 21–32)
COLOR UR: YELLOW
CREAT SERPL-MCNC: 1.58 MG/DL (ref 0.6–1.3)
DOP CALC AO PEAK VEL: 3.9 M/S
DOP CALC AO VTI: 71.68 CM
DOP CALC LVOT AREA: 3.46 CM2
DOP CALC LVOT DIAMETER: 2.1 CM
DOP CALC LVOT PEAK VEL VTI: 25.89 CM
DOP CALC LVOT PEAK VEL: 1.26 M/S
DOP CALC LVOT STROKE INDEX: 37.1 ML/M2
DOP CALC LVOT STROKE VOLUME: 89.63 CM3
DOP CALC MV VTI: 38.42 CM
EOSINOPHIL # BLD AUTO: 0 THOUSAND/ÂΜL (ref 0–0.61)
EOSINOPHIL # BLD MANUAL: 0 THOUSAND/UL (ref 0–0.4)
EOSINOPHIL NFR BLD AUTO: 0 % (ref 0–6)
EOSINOPHIL NFR BLD MANUAL: 0 % (ref 0–6)
ERYTHROCYTE [DISTWIDTH] IN BLOOD BY AUTOMATED COUNT: 21.3 % (ref 11.6–15.1)
ERYTHROCYTE [DISTWIDTH] IN BLOOD BY AUTOMATED COUNT: 21.4 % (ref 11.6–15.1)
EST. AVERAGE GLUCOSE BLD GHB EST-MCNC: 100 MG/DL
FINE GRAN CASTS URNS QL MICRO: ABNORMAL /LPF
FRACTIONAL SHORTENING: 29 % (ref 28–44)
GFR SERPL CREATININE-BSD FRML MDRD: 40 ML/MIN/1.73SQ M
GLUCOSE P FAST SERPL-MCNC: 142 MG/DL (ref 65–99)
GLUCOSE SERPL-MCNC: 142 MG/DL (ref 65–140)
GLUCOSE UR STRIP-MCNC: NEGATIVE MG/DL
HBA1C MFR BLD: 5.1 %
HCO3 BLDA-SCNC: 29 MMOL/L (ref 22–28)
HCT VFR BLD AUTO: 27.2 % (ref 36.5–49.3)
HCT VFR BLD AUTO: 28.5 % (ref 36.5–49.3)
HDLC SERPL-MCNC: 37 MG/DL
HGB BLD-MCNC: 8.1 G/DL (ref 12–17)
HGB BLD-MCNC: 8.6 G/DL (ref 12–17)
HGB UR QL STRIP.AUTO: ABNORMAL
IMM GRANULOCYTES # BLD AUTO: 0.09 THOUSAND/UL (ref 0–0.2)
IMM GRANULOCYTES NFR BLD AUTO: 1 % (ref 0–2)
INTERVENTRICULAR SEPTUM IN DIASTOLE (PARASTERNAL SHORT AXIS VIEW): 1.9 CM
INTERVENTRICULAR SEPTUM: 1.9 CM (ref 0.6–1.1)
KETONES UR STRIP-MCNC: NEGATIVE MG/DL
LAAS-AP2: 34 CM2
LAAS-AP4: 37.5 CM2
LDLC SERPL CALC-MCNC: 36 MG/DL (ref 0–100)
LEFT ATRIUM AREA SYSTOLE SINGLE PLANE A4C: 35.7 CM2
LEFT ATRIUM SIZE: 5 CM
LEFT INTERNAL DIMENSION IN SYSTOLE: 3.2 CM (ref 2.1–4)
LEFT VENTRICULAR INTERNAL DIMENSION IN DIASTOLE: 4.5 CM (ref 3.5–6)
LEFT VENTRICULAR POSTERIOR WALL IN END DIASTOLE: 1.9 CM
LEFT VENTRICULAR STROKE VOLUME: 48 ML
LEUKOCYTE ESTERASE UR QL STRIP: NEGATIVE
LVSV (TEICH): 48 ML
LYMPHOCYTES # BLD AUTO: 0.23 THOUSANDS/ÂΜL (ref 0.6–4.47)
LYMPHOCYTES # BLD AUTO: 0.44 THOUSAND/UL (ref 0.6–4.47)
LYMPHOCYTES # BLD AUTO: 3 % (ref 14–44)
LYMPHOCYTES NFR BLD AUTO: 2 % (ref 14–44)
MAGNESIUM SERPL-MCNC: 1.9 MG/DL (ref 1.6–2.6)
MCH RBC QN AUTO: 26.6 PG (ref 26.8–34.3)
MCH RBC QN AUTO: 27.1 PG (ref 26.8–34.3)
MCHC RBC AUTO-ENTMCNC: 29.8 G/DL (ref 31.4–37.4)
MCHC RBC AUTO-ENTMCNC: 30.2 G/DL (ref 31.4–37.4)
MCV RBC AUTO: 90 FL (ref 82–98)
MCV RBC AUTO: 90 FL (ref 82–98)
METAMYELOCYTES NFR BLD MANUAL: 2 % (ref 0–1)
MONOCYTES # BLD AUTO: 0.28 THOUSAND/ÂΜL (ref 0.17–1.22)
MONOCYTES # BLD AUTO: 1.18 THOUSAND/UL (ref 0–1.22)
MONOCYTES NFR BLD AUTO: 2 % (ref 4–12)
MONOCYTES NFR BLD: 8 % (ref 4–12)
MV E'TISSUE VEL-LAT: 11 CM/S
MV E'TISSUE VEL-SEP: 7 CM/S
MV MEAN GRADIENT: 4 MMHG
MV PEAK GRADIENT: 11 MMHG
MV VALVE AREA BY CONTINUITY EQUATION: 2.33 CM2
NEUTROPHILS # BLD AUTO: 11.94 THOUSANDS/ÂΜL (ref 1.85–7.62)
NEUTROPHILS # BLD MANUAL: 12.88 THOUSAND/UL (ref 1.85–7.62)
NEUTS BAND NFR BLD MANUAL: 7 % (ref 0–8)
NEUTS SEG NFR BLD AUTO: 80 % (ref 43–75)
NEUTS SEG NFR BLD AUTO: 95 % (ref 43–75)
NITRITE UR QL STRIP: NEGATIVE
NON-SQ EPI CELLS URNS QL MICRO: ABNORMAL /HPF
NRBC BLD AUTO-RTO: 0 /100 WBCS
NT-PROBNP SERPL-MCNC: ABNORMAL PG/ML
O2 CT BLDA-SCNC: 6.8 ML/DL (ref 16–23)
OXYHGB MFR BLDA: 51.1 % (ref 94–97)
PCO2 BLDA: 44 MM HG (ref 36–44)
PCO2 TEMP ADJ BLDA: 44.4 MM HG (ref 36–44)
PH BLD: 7.43 [PH] (ref 7.35–7.45)
PH BLDA: 7.44 [PH] (ref 7.35–7.45)
PH UR STRIP.AUTO: 6 [PH]
PLATELET # BLD AUTO: 144 THOUSANDS/UL (ref 149–390)
PLATELET # BLD AUTO: 158 THOUSANDS/UL (ref 149–390)
PLATELET BLD QL SMEAR: ADEQUATE
PMV BLD AUTO: 9.3 FL (ref 8.9–12.7)
PMV BLD AUTO: 9.4 FL (ref 8.9–12.7)
PO2 BLD: 29.4 MM HG (ref 75–129)
PO2 BLDA: 29 MM HG (ref 75–129)
POLYCHROMASIA BLD QL SMEAR: PRESENT
POTASSIUM SERPL-SCNC: 3.6 MMOL/L (ref 3.5–5.3)
PROCALCITONIN SERPL-MCNC: 1.79 NG/ML
PROT UR STRIP-MCNC: ABNORMAL MG/DL
PSA SERPL-MCNC: 0.1 NG/ML (ref 0–4)
PV PEAK GRADIENT: 6 MMHG
RBC # BLD AUTO: 3.04 MILLION/UL (ref 3.88–5.62)
RBC # BLD AUTO: 3.17 MILLION/UL (ref 3.88–5.62)
RBC #/AREA URNS AUTO: ABNORMAL /HPF
RBC MORPH BLD: PRESENT
RIGHT ATRIUM AREA SYSTOLE A4C: 22 CM2
RIGHT VENTRICLE ID DIMENSION: 3.3 CM
SL CV LEFT ATRIUM LENGTH A2C: 6.5 CM
SL CV LV EF: 60
SL CV PED ECHO LEFT VENTRICLE DIASTOLIC VOLUME (MOD BIPLANE) 2D: 91 ML
SL CV PED ECHO LEFT VENTRICLE SYSTOLIC VOLUME (MOD BIPLANE) 2D: 42 ML
SMUDGE CELLS BLD QL SMEAR: PRESENT
SODIUM SERPL-SCNC: 133 MMOL/L (ref 135–147)
SP GR UR STRIP.AUTO: 1.01 (ref 1–1.03)
SPECIMEN SOURCE: ABNORMAL
TOXIC GRANULES BLD QL SMEAR: PRESENT
TRIGL SERPL-MCNC: 55 MG/DL
UROBILINOGEN UR QL STRIP.AUTO: 0.2 E.U./DL
WBC # BLD AUTO: 12.55 THOUSAND/UL (ref 4.31–10.16)
WBC # BLD AUTO: 14.8 THOUSAND/UL (ref 4.31–10.16)
WBC #/AREA URNS AUTO: ABNORMAL /HPF

## 2022-12-06 RX ORDER — POLYETHYLENE GLYCOL 3350 17 G/17G
17 POWDER, FOR SOLUTION ORAL DAILY PRN
Status: DISCONTINUED | OUTPATIENT
Start: 2022-12-06 | End: 2022-12-20 | Stop reason: HOSPADM

## 2022-12-06 RX ORDER — ATORVASTATIN CALCIUM 80 MG/1
80 TABLET, FILM COATED ORAL ONCE
Status: COMPLETED | OUTPATIENT
Start: 2022-12-06 | End: 2022-12-06

## 2022-12-06 RX ORDER — ATORVASTATIN CALCIUM 40 MG/1
40 TABLET, FILM COATED ORAL
Status: DISCONTINUED | OUTPATIENT
Start: 2022-12-07 | End: 2022-12-20 | Stop reason: HOSPADM

## 2022-12-06 RX ORDER — ISOSORBIDE MONONITRATE 30 MG/1
30 TABLET, EXTENDED RELEASE ORAL DAILY
Status: DISCONTINUED | OUTPATIENT
Start: 2022-12-06 | End: 2022-12-20 | Stop reason: HOSPADM

## 2022-12-06 RX ORDER — ALLOPURINOL 100 MG/1
200 TABLET ORAL DAILY
Status: DISCONTINUED | OUTPATIENT
Start: 2022-12-06 | End: 2022-12-20 | Stop reason: HOSPADM

## 2022-12-06 RX ORDER — ACETAMINOPHEN 325 MG/1
650 TABLET ORAL EVERY 6 HOURS PRN
Status: DISCONTINUED | OUTPATIENT
Start: 2022-12-06 | End: 2022-12-18

## 2022-12-06 RX ORDER — PRAVASTATIN SODIUM 40 MG
40 TABLET ORAL
Status: DISCONTINUED | OUTPATIENT
Start: 2022-12-06 | End: 2022-12-06

## 2022-12-06 RX ORDER — FLUTICASONE FUROATE AND VILANTEROL 200; 25 UG/1; UG/1
1 POWDER RESPIRATORY (INHALATION) DAILY
Status: DISCONTINUED | OUTPATIENT
Start: 2022-12-06 | End: 2022-12-20 | Stop reason: HOSPADM

## 2022-12-06 RX ORDER — FAMOTIDINE 20 MG/1
10 TABLET, FILM COATED ORAL ONCE
Status: COMPLETED | OUTPATIENT
Start: 2022-12-06 | End: 2022-12-06

## 2022-12-06 RX ORDER — CEFAZOLIN SODIUM 2 G/50ML
2000 SOLUTION INTRAVENOUS EVERY 8 HOURS
Status: DISCONTINUED | OUTPATIENT
Start: 2022-12-06 | End: 2022-12-20 | Stop reason: HOSPADM

## 2022-12-06 RX ORDER — ASPIRIN 325 MG
325 TABLET ORAL ONCE
Status: COMPLETED | OUTPATIENT
Start: 2022-12-06 | End: 2022-12-06

## 2022-12-06 RX ORDER — CEFTRIAXONE 2 G/50ML
2000 INJECTION, SOLUTION INTRAVENOUS EVERY 24 HOURS
Status: DISCONTINUED | OUTPATIENT
Start: 2022-12-06 | End: 2022-12-06

## 2022-12-06 RX ORDER — FINASTERIDE 5 MG/1
5 TABLET, FILM COATED ORAL DAILY
Status: DISCONTINUED | OUTPATIENT
Start: 2022-12-06 | End: 2022-12-20 | Stop reason: HOSPADM

## 2022-12-06 RX ORDER — GABAPENTIN 100 MG/1
100 CAPSULE ORAL
Status: DISCONTINUED | OUTPATIENT
Start: 2022-12-06 | End: 2022-12-20 | Stop reason: HOSPADM

## 2022-12-06 RX ORDER — TORSEMIDE 20 MG/1
40 TABLET ORAL DAILY
Status: DISCONTINUED | OUTPATIENT
Start: 2022-12-07 | End: 2022-12-11

## 2022-12-06 RX ORDER — ALBUTEROL SULFATE 2.5 MG/3ML
2.5 SOLUTION RESPIRATORY (INHALATION) EVERY 6 HOURS PRN
Status: DISCONTINUED | OUTPATIENT
Start: 2022-12-06 | End: 2022-12-15

## 2022-12-06 RX ORDER — PANTOPRAZOLE SODIUM 40 MG/1
40 TABLET, DELAYED RELEASE ORAL
Status: DISCONTINUED | OUTPATIENT
Start: 2022-12-06 | End: 2022-12-20 | Stop reason: HOSPADM

## 2022-12-06 RX ORDER — DULOXETIN HYDROCHLORIDE 60 MG/1
60 CAPSULE, DELAYED RELEASE ORAL 2 TIMES DAILY
Status: DISCONTINUED | OUTPATIENT
Start: 2022-12-06 | End: 2022-12-20 | Stop reason: HOSPADM

## 2022-12-06 RX ORDER — SACCHAROMYCES BOULARDII 250 MG
250 CAPSULE ORAL 2 TIMES DAILY
Status: DISCONTINUED | OUTPATIENT
Start: 2022-12-06 | End: 2022-12-20 | Stop reason: HOSPADM

## 2022-12-06 RX ORDER — ONDANSETRON 2 MG/ML
4 INJECTION INTRAMUSCULAR; INTRAVENOUS EVERY 6 HOURS PRN
Status: DISCONTINUED | OUTPATIENT
Start: 2022-12-06 | End: 2022-12-20 | Stop reason: HOSPADM

## 2022-12-06 RX ADMIN — PIPERACILLIN AND TAZOBACTAM 3.38 G: 3; .375 INJECTION, POWDER, LYOPHILIZED, FOR SOLUTION INTRAVENOUS at 02:34

## 2022-12-06 RX ADMIN — DULOXETINE HYDROCHLORIDE 60 MG: 60 CAPSULE, DELAYED RELEASE ORAL at 09:06

## 2022-12-06 RX ADMIN — GABAPENTIN 100 MG: 100 CAPSULE ORAL at 21:54

## 2022-12-06 RX ADMIN — ALLOPURINOL 200 MG: 100 TABLET ORAL at 09:05

## 2022-12-06 RX ADMIN — ISOSORBIDE MONONITRATE 30 MG: 30 TABLET, EXTENDED RELEASE ORAL at 09:05

## 2022-12-06 RX ADMIN — CEFTRIAXONE 2000 MG: 2 INJECTION, SOLUTION INTRAVENOUS at 09:06

## 2022-12-06 RX ADMIN — ATORVASTATIN CALCIUM 80 MG: 80 TABLET, FILM COATED ORAL at 05:15

## 2022-12-06 RX ADMIN — Medication 250 MG: at 09:05

## 2022-12-06 RX ADMIN — CEFAZOLIN SODIUM 2000 MG: 2 SOLUTION INTRAVENOUS at 13:13

## 2022-12-06 RX ADMIN — FAMOTIDINE 10 MG: 20 TABLET ORAL at 07:49

## 2022-12-06 RX ADMIN — Medication 250 MG: at 17:46

## 2022-12-06 RX ADMIN — RIVAROXABAN 15 MG: 15 TABLET, FILM COATED ORAL at 09:05

## 2022-12-06 RX ADMIN — CEFAZOLIN SODIUM 2000 MG: 2 SOLUTION INTRAVENOUS at 21:54

## 2022-12-06 RX ADMIN — FLUTICASONE FUROATE AND VILANTEROL TRIFENATATE 1 PUFF: 200; 25 POWDER RESPIRATORY (INHALATION) at 09:06

## 2022-12-06 RX ADMIN — FINASTERIDE 5 MG: 5 TABLET, FILM COATED ORAL at 09:05

## 2022-12-06 RX ADMIN — DULOXETINE HYDROCHLORIDE 60 MG: 60 CAPSULE, DELAYED RELEASE ORAL at 17:46

## 2022-12-06 RX ADMIN — PANTOPRAZOLE SODIUM 40 MG: 40 TABLET, DELAYED RELEASE ORAL at 05:24

## 2022-12-06 RX ADMIN — ASPIRIN 325 MG: 325 TABLET ORAL at 05:15

## 2022-12-06 RX ADMIN — UMECLIDINIUM 1 PUFF: 62.5 AEROSOL, POWDER ORAL at 09:06

## 2022-12-06 NOTE — ASSESSMENT & PLAN NOTE
Likely acute toxic metabolic encephalopathy due to underlying infection, rule out CVA  Patient reports sleepy and feeling weak, oriented to place and person day of week on exam   · CT head showed - Hypodensity in the right parieto-occipital region most likely represents a chronic infarct, however no prior study is available to confirm this    Also differential could be metastatic disease based on the CT of the chest  · No focal neuro deficit on exam   Mental status has improved significantly with treatment of sepsis  · ABG no hypercapnia  · Patient was initially worked up for possible stroke  · Neurology input appreciated  · Continue Xarelto 15 mg p o  daily and Zocor 20 mg p o  daily  · Patient could not get MRI of the brain due to incompatible pacemaker  · Was given full dose aspirin in the ED along with Lipitor 80 mg p o  daily and was continued on Lipitor 40 mg p o  daily  · Hemoglobin A1c was 5 1  ·  neurology recommending repeating CAT scan in 1 week  · PT/OT evaluation and treatment

## 2022-12-06 NOTE — PLAN OF CARE
Problem: Potential for Falls  Goal: Patient will remain free of falls  Description: INTERVENTIONS:  - Educate patient/family on patient safety including physical limitations  - Instruct patient to call for assistance with activity   - Consult OT/PT to assist with strengthening/mobility   - Keep Call bell within reach  - Keep bed low and locked with side rails adjusted as appropriate  - Keep care items and personal belongings within reach  - Initiate and maintain comfort rounds  - Make Fall Risk Sign visible to staff  - Offer Toileting every 2 Hours, in advance of need  - Initiate/Maintain bedalarm  - Obtain necessary fall risk management equipment: alarm  - Apply yellow socks and bracelet for high fall risk patients  - Consider moving patient to room near nurses station  Outcome: Progressing     Problem: RESPIRATORY - ADULT  Goal: Achieves optimal ventilation and oxygenation  Description: INTERVENTIONS:  - Assess for changes in respiratory status  - Assess for changes in mentation and behavior  - Position to facilitate oxygenation and minimize respiratory effort  - Oxygen administered by appropriate delivery if ordered  - Initiate smoking cessation education as indicated  - Encourage broncho-pulmonary hygiene including cough, deep breathe, Incentive Spirometry  - Assess the need for suctioning and aspirate as needed  - Assess and instruct to report SOB or any respiratory difficulty  - Respiratory Therapy support as indicated  Outcome: Progressing     Problem: MOBILITY - ADULT  Goal: Maintain or return to baseline ADL function  Description: INTERVENTIONS:  -  Assess patient's ability to carry out ADLs; assess patient's baseline for ADL function and identify physical deficits which impact ability to perform ADLs (bathing, care of mouth/teeth, toileting, grooming, dressing, etc )  - Assess/evaluate cause of self-care deficits   - Assess range of motion  - Assess patient's mobility; develop plan if impaired  - Assess patient's need for assistive devices and provide as appropriate  - Encourage maximum independence but intervene and supervise when necessary  - Involve family in performance of ADLs  - Assess for home care needs following discharge   - Consider OT consult to assist with ADL evaluation and planning for discharge  - Provide patient education as appropriate  Outcome: Progressing  Goal: Maintains/Returns to pre admission functional level  Description: INTERVENTIONS:  - Perform BMAT or MOVE assessment daily    - Set and communicate daily mobility goal to care team and patient/family/caregiver  - Collaborate with rehabilitation services on mobility goals if consulted  - Perform Range of Motion 2 times a day  - Reposition patient every 2 hours    - Dangle patient 3 times a day  - Stand patient 3 times a day  - Ambulate patient 3 times a day  - Out of bed to chair 3 times a day   - Out of bed for meals 3 times a day  - Out of bed for toileting  - Record patient progress and toleration of activity level   Outcome: Progressing     Problem: Prexisting or High Potential for Compromised Skin Integrity  Goal: Skin integrity is maintained or improved  Description: INTERVENTIONS:  - Identify patients at risk for skin breakdown  - Assess and monitor skin integrity  - Assess and monitor nutrition and hydration status  - Monitor labs   - Assess for incontinence   - Turn and reposition patient  - Assist with mobility/ambulation  - Relieve pressure over bony prominences  - Avoid friction and shearing  - Provide appropriate hygiene as needed including keeping skin clean and dry  - Evaluate need for skin moisturizer/barrier cream  - Collaborate with interdisciplinary team   - Patient/family teaching  - Consider wound care consult   Outcome: Progressing     Problem: Neurological Deficit  Goal: Neurological status is stable or improving  Description: Interventions:  - Monitor and assess patient's level of consciousness, motor function, sensory function, and level of assistance needed for ADLs  - Monitor and report changes from baseline  Collaborate with interdisciplinary team to initiate plan and implement interventions as ordered  - Provide and maintain a safe environment  - Consider seizure precautions  - Consider fall precautions  - Consider aspiration precautions  - Consider bleeding precautions  Outcome: Progressing     Problem: Activity Intolerance/Impaired Mobility  Goal: Mobility/activity is maintained at optimum level for patient  Description: Interventions:  - Assess and monitor patient  barriers to mobility and need for assistive/adaptive devices  - Assess patient's emotional response to limitations  - Collaborate with interdisciplinary team and initiate plans and interventions as ordered  - Encourage independent activity per ability   - Maintain proper body alignment  - Perform active/passive rom as tolerated/ordered  - Plan activities to conserve energy   - Turn patient as appropriate  Outcome: Progressing     Problem: Communication Impairment  Goal: Ability to express needs and understand communication  Description: Assess patient's communication skills and ability to understand information  Patient will demonstrate use of effective communication techniques, alternative methods of communication and understanding even if not able to speak  - Encourage communication and provide alternate methods of communication as needed  - Collaborate with case management/ for discharge needs  - Include patient/family/caregiver in decisions related to communication  Outcome: Progressing     Problem: Potential for Aspiration  Goal: Non-ventilated patient's risk of aspiration is minimized  Description: Assess and monitor vital signs, respiratory status, and labs (WBC)  Monitor for signs of aspiration (tachypnea, cough, rales, wheezing, cyanosis, fever)  - Assess and monitor patient's ability to swallow    - Place patient up in chair to eat if possible  - HOB up at 90 degrees to eat if unable to get patient up into chair   - Supervise patient during oral intake  - Instruct patient/ family to take small bites  - Instruct patient/ family to take small single sips when taking liquids  - Follow patient-specific strategies generated by speech pathologist   Outcome: Progressing     Problem: Nutrition  Goal: Nutrition/Hydration status is improving  Description: Monitor and assess patient's nutrition/hydration status for malnutrition (ex- brittle hair, bruises, dry skin, pale skin and conjunctiva, muscle wasting, smooth red tongue, and disorientation)  Collaborate with interdisciplinary team and initiate plan and interventions as ordered  Monitor patient's weight and dietary intake as ordered or per policy  Utilize nutrition screening tool and intervene per policy  Determine patient's food preferences and provide high-protein, high-caloric foods as appropriate  - Assist patient with eating   - Allow adequate time for meals   - Encourage patient to take dietary supplement as ordered  - Collaborate with clinical nutritionist   - Include patient/family/caregiver in decisions related to nutrition    Outcome: Progressing

## 2022-12-06 NOTE — ASSESSMENT & PLAN NOTE
CT Chest showed numerous new pulmonary nodules concerning for metastatic disease along with mediastinal and hilar lymphadenopathy and small right pleural effusion  · Patient had CT abdomen p bon without contrast on 11/19/2022 which was unremarkable  · CT of the brain also showing hypodensity in the right parieto-occipital region which might represent chronic infarct/metastasis  · Pulmonary was consulted who is recommending IR guided lung biopsy  · Patient did have a colonoscopy during prior hospitalization which showed no masses  · No history of malignancy as per family  · Bilateral lung nodules could also be related to septic emboli  patient might need repeat CAT scan in 3 months    If the nodules are improving patient might not need further work-up but if the nodules are persistent might need biopsy

## 2022-12-06 NOTE — CONSULTS
[address]   Neurology Initial Consult    Ford Wynn is a 80 y o  male  2 [facility] 219/2 [address] [address] obtained from:   Chief Complaint   Patient presents with   • Weakness - Generalized     Patient arrives via EMS for weakness at home, no sob, no nausea or vomiting, just weakness, temp upon arrival is 98 4 orally, O2 sat=97% on RA         Assessment/Plan:    1  Generalized weakness  2  Metabolic encephalopathy  3  SHAWNEE  4  Leukocytosis  5  Elevated troponins  6  PMH of lung cancer    - Monitored on telemetry  - Fall risk  - Neurological assessments  - Continue Xarelto 15 mg daily  - Continue Zocor 20 mg daily  - Unable to get MRI of the brain with and without contrast secondary to incompatible pacemaker  - PT/OT/ST  - Labs with lipid profile and A1c  -Patient with low suspicion of stroke, suspect etiology to be metabolic encephalopathy therefore no additional diagnostic testing will be needed at this time  Patient is an 70-year-old male with PMH of CKD, CAD, atrial flutter with pacemaker, sleep apnea and lung CA who has had recent onset of generalized weakness and somnolence  Patient reported being fatigued and somnolent x24 hours  Patient was brought to the ER, he presented with elevated creatinine and BUN  Also has higher troponins from last admission at 325 and positive leukocytosis with anemia hemoglobin 9 1  Patient has recent admission, discharged on 11/22/2022 after having GI bleed  He had been on Xarelto and Plavix prior to that hospital stay  His Plavix was discontinued at that time  Patient has no focal deficits on exam today, he has equal strength in the upper extremities  He has equal lower extremity strength with 4/5 on the hip, remaining lower extremity muscle groups 4+/5  Patient has equal sensation and reflexes  He is able to stand and bear weight with the assistance of a rolling walker    He does get very short of breath and desaturates with activity and movement  Cranial nerves II through XII intact with exception to left eye strabismus  Left eye does leg with EOM  Patient had a CTh showing hypodensity in the right parietal-occipital lobe, initially suspected possible chronic infarct however with history of lung CA clarification indicating the possibility that this is metastasis  Patient was recommended MRI of the brain with and without contrast for further characterization however unable to obtain due to patient's pacemaker being incompatible  Patient has no evidence of focal deficits indicating possible CVA suspect his generalized weakness and somnolence is related to metabolic derangement such as SHAWNEE, sepsis/leukocytosis and elevated troponins  Patient also short of breath with some hypoxia second to the lung CA  there is very low suspicion of CVA at this point time, no further diagnostics warranted from neurological perspective  Melissa Fernandez will not need outpatient follow up with Neurology  He will not require outpatient neurological testing  HPI:  Melissa Fernandez "Shameka Aiken" is an 79yo male with PMH of CKD, atrial flutter, CAD with stents, pacer which is MRI incompatible, sleep apnea and lung CA  Patient presented to the ER with complaints of somnolence x24 hours and generalized weakness  Upon his arrival to the ER his blood pressure was 138/65  Patient's creatinine was 1 65 with a BUN of 32  His troponin was 325, WBCs 15 38 and hemoglobin of 9 1  Patient had a CTh showing possible chronic infarcts versus mets with hypodensity in the right parietal parietal occipital region  Patient does have a history of lung CA therefore there was an addendum to this CTh finding indicating that there is a possibility the hypodensity could be related to metastasis  Patient was admitted to telemetry for further evaluation or work-up  Recommended MRI with without contrast however patient has a pacemaker that is incompatible    Spoke with patient at bedside, states that he has been generally weak for a while, has increased fatigue and shortness of breath over the last 24 hours  ,  Has increased fatigue and shortness of breath over the last 24 hours  Patient was sitting up in a chair working with physical therapy and became tired, was assisted back to bed  His lower extremities weaker using rolling walker for transfer assistance  On neuro exam patient has equal upper extremity strength, lower extremity 4/5 at the hip, 4+/5 in remaining muscle groups bilaterally  Patient has equal reflexes and equal sensation  No ataxia was appreciated on exam, patient had difficulties with heel-to-shin, was able to raise to above the ankle and back  Cranial nerves assessed, 2 through 12 intact although to note patient does have left eye strabismus  Patient has no significant focal deficits noted on examination  There is a possibility of a chronic right parietal occipital lobe infarct versus metastasis, unable to verify with MRI of the brain  Patient is on Xarelto and Zocor  His oxygenation has been labile, peripheral saturation in the 90s however patient had ABG done this morning with a PO2 of 29  Patient has some SHAWNEE with some hyponatremia and elevated troponins and WBC counts  Suspect weakness and fatigue related to metabolic derangements regarding the above findings  No additional neurological interventions will be needed at this time      Past Medical History:   Diagnosis Date   • Arthritis    • Atrial flutter ([address] )    • Chronic kidney disease     stage 3   • Coronary artery disease     2 stents   • Fluid retention    • Gout    • Heart disease    • Heart failure (HCC)     pacemaker   • Hypertension    • Hyponatremia 04/08/2019   • Neurological disorder    • Pacemaker    • Pulmonary emphysema ([address] )    • Radiculopathy     last assessed 1/28/16    • Shortness of breath     exertion   • Sleep apnea     c pap       Past Surgical History:   Procedure Laterality Date   • ANGIOPLASTY      x2 2 stents and then replaced   • CARDIAC PACEMAKER PLACEMENT      pacemaker permanent placement dual chamber / last assessed 4/7/14 / implantation    • CARDIAC SURGERY      pacemaker   • CHOLECYSTECTOMY     • CORONARY ANGIOPLASTY WITH STENT PLACEMENT     • EPIDURAL BLOCK INJECTION N/A 05/26/2016    Procedure: BLOCK / INJECTION EPIDURAL STEROID LUMBAR  L4-5;  Surgeon: Radha Andrade MD;  Location: [place] [address] MAIN OR;  Service:    • EPIDURAL BLOCK INJECTION N/A 02/14/2019    Procedure: L4 L5 Lumbar Epidural Steroid Injection;  Surgeon: Radha Andrade MD;  Location: [place] [address] MAIN OR;  Service: Pain Management    • EYE SURGERY      cataract left   • HAND SURGERY     • HIP PINNING Left    • INSERT / REPLACE / REMOVE PACEMAKER     • KNEE ARTHROSCOPY W/ MENISCAL REPAIR Left    • KNEE SURGERY     • LUMBAR EPIDURAL INJECTION N/A 03/17/2016    Procedure: BLOCK / INJECTION LUMBAR  L4-5  (C-ARM); Surgeon: Radha Andrade MD;  Location: [facility] MAIN OR;  Service:    • NC OPEN RX FEMUR FX+INTRAMED ELLA Right 01/31/2022    Procedure: INSERTION NAIL IM FEMUR ANTEGRADE (TROCHANTERIC);   Surgeon: Ana Alvarez MD;  Location: [facility] Main OR;  Service: Orthopedics       No Known Allergies      Current Facility-Administered Medications:   •  acetaminophen (TYLENOL) tablet 650 mg, 650 mg, Oral, Q6H PRN, NAVA Coleman  •  albuterol inhalation solution 2 5 mg, 2 5 mg, Nebulization, Q6H PRN, NAVA Coleman  •  allopurinol (ZYLOPRIM) tablet 200 mg, 200 mg, Oral, Daily, NAVA Coleman, 200 mg at 12/06/22 0905  •  [START ON 12/7/2022] atorvastatin (LIPITOR) tablet 40 mg, 40 mg, Oral, Daily With Dinner, NAVA Coleman  •  cefTRIAXone (ROCEPHIN) IVPB (premix in dextrose) 2,000 mg 50 mL, 2,000 mg, Intravenous, Q24H, NAVA Coleman, Last Rate: 100 mL/hr at 12/06/22 0906, 2,000 mg at 12/06/22 0906  •  DULoxetine (CYMBALTA) delayed release capsule 60 mg, 60 mg, Oral, BID, NAVA Coleman, 60 mg at 12/06/22 0906  •  finasteride (PROSCAR) tablet 5 mg, 5 mg, Oral, Daily, Cucristal Hanksrirajendra, CRNP, 5 mg at 12/06/22 4032  •  fluticasone-vilanterol 200-25 mcg/actuation 1 puff, 1 puff, Inhalation, Daily, Cucristal Hanksrirajendra, CRNP, 1 puff at 12/06/22 0906  •  gabapentin (NEURONTIN) capsule 100 mg, 100 mg, Oral, HS, Cuibarbara Hanksrirajendra, CRNP  •  isosorbide mononitrate (IMDUR) 24 hr tablet 30 mg, 30 mg, Oral, Daily, Kel Arce, CRNP, 30 mg at 12/06/22 4167  •  ondansetron (ZOFRAN) injection 4 mg, 4 mg, Intravenous, Q6H PRN, Kel Arce, CRNP  •  pantoprazole (PROTONIX) EC tablet 40 mg, 40 mg, Oral, Early Morning, Kel Arce, CRNP, 40 mg at 12/06/22 9768  •  polyethylene glycol (MIRALAX) packet 17 g, 17 g, Oral, Daily PRN, Kel Arce, CRNP  •  rivaroxaban (XARELTO) tablet 15 mg, 15 mg, Oral, Daily With Breakfast, Kel Hanksrirajendra, CRNP, 15 mg at 12/06/22 1499  •  saccharomyces boulardii (FLORASTOR) capsule 250 mg, 250 mg, Oral, BID, Cucristal Hanksrirajendra, CRNP, 250 mg at 12/06/22 0905  •  [START ON 12/7/2022] torsemide (DEMADEX) tablet 40 mg, 40 mg, Oral, Daily, Cuibarbara Hanksrirajendra, CRNP  •  umeclidinium 62 5 mcg/actuation inhaler AEPB 1 puff, 1 puff, Inhalation, Daily, Cucristal Arce, CRNP, 1 puff at 12/06/22 0906    Social History     Socioeconomic History   • Marital status: /Civil Union     Spouse name: Not on file   • Number of children: Not on file   • Years of education: Not on file   • Highest education level: Not on file   Occupational History   • Occupation: RETIRED   Tobacco Use   • Smoking status: Every Day     Packs/day: 0 25     Years: 50 00     Pack years: 12 50     Types: Cigarettes     Start date: 1/1/2022   • Smokeless tobacco: Former   • Tobacco comments:     quit 02/14/2021   Vaping Use   • Vaping Use: Never used   Substance and Sexual Activity   • Alcohol use: Never   • Drug use: No   • Sexual activity: Yes     Partners: Female     Birth control/protection: None   Other Topics Concern   • Not on file   Social History Narrative    Daily tea consumption 10 cups day      Social Determinants of Health     Financial Resource Strain: Not on file   Food Insecurity: No Food Insecurity   • Worried About Running Out of Food in the Last Year: Never true   • Ran Out of Food in the Last Year: Never true   Transportation Needs: No Transportation Needs   • Lack of Transportation (Medical): No   • Lack of Transportation (Non-Medical): No   Physical Activity: Not on file   Stress: Not on file   Social Connections: Not on file   Intimate Partner Violence: Not on file   Housing Stability: Low Risk    • Unable to Pay for Housing in the Last Year: No   • Number of Places Lived in the Last Year: 1   • Unstable Housing in the Last Year: No       Family History   Problem Relation Age of Onset   • Cancer Mother 80        Cancer when 80 yrs old   • Heart disease Mother    • Hypertension Mother    • Heart disease Father    • Cancer Father         Heart   46   • Diabetes Neg Hx    • Stroke Neg Hx          Review of systems:  Please see HPI for positive symptoms  Constitutional: No fever, no chills, no weight change  +fatigue and weakness  Ocular: No diplopia, no blurred vision, spots/zigzag lines  HEENT:  No sore throat, headache or congestion  COR:  No chest pain  No palpitations  Lungs:  + sob  GI:  no  nausea, no vomiting, no diarrhea, no constipation, no anorexia  :  No dysuria, frequency, or urgency  No hematuria  Musculoskeletal:  No joint pain or swelling   Skin:  No rash or itching  Psychiatric:  no anxiety, no depression  Endocrine:  No polyuria or polydipsia  Physical examination:  /53   Pulse 84   Temp 99 4 °F (37 4 °C) (Oral)   Resp 16   Ht 6' 2" (1 88 m)   Wt 120 kg (265 lb)   SpO2 97%   BMI 34 02 kg/m²     GENERAL APPEARANCE:  The patient is alert, oriented  HEENT:  Head is normocephalic  Pupils are equal and reactive  NECK:  Supple without lymphadenopathy     HEART:  Regular rate and rhythm  LUNGS: No audible wheezing or stridor heard  ABDOMEN:  Soft, nontender, nondistended  EXTREMITIES:  Without cyanosis or clubbing  +generalized edema  +ecchymosis throughout    Mental status: The patient is alert, attentive, and oriented  Speech is clear and fluent, good repetition, comprehension, and naming  Cranial nerves:  CN II: Visual fields are full to confrontation  Fundoscopic exam is normal with sharp discs and no vascular changes  Pupils are 3 mm and briskly reactive to light  CN III, IV, VI: At primary gaze, there is no eye deviation  CN V: Facial sensation is intact in all 3 divisions bilaterally  Corneal responses are intact  CN VII: Face is symmetric with normal eye closure and smile  CN VIII: Hearing is normal to rubbing fingers  CN IX, X: Palate elevates symmetrically  Phonation is normal   CN XI: Head turning and shoulder shrug are intact  CN XII: Tongue is midline with normal movements and no atrophy  Motor: There is no pronator drift of out-stretched arms  Muscle bulk and tone are normal    Muscle exam  Arm Right Left Leg Right Left   Deltoid 5/5 5/5 Iliopsoas 4/5 4/5   Biceps 5/5 5/5 Quads 4+/5 4+/5   Triceps 5/5 5/5 Hamstrings 4+/5 4+/5   Wrist Extension 5/5 5/5 Ankle Dorsi Flexion 5/5 5/5   Wrist Flexion 5/5 5/5 Ankle Plantar Flexion 5/5 5/5        Reflexes    RJ BJ TJ KJ AJ Plantars Ledezma's   Right 2+ 2+ 2+ 2+ 2+ Downgoing Not present   Left 2+ 2+ 2+ 2+ 2+ Downgoing Not present      Sensory:  Light touch, Temperature, position sense, and vibration sense are intact in fingers and toes  Coordination:  Rapid alternating movements and fine finger movements are intact  There is no dysmetria on finger-to-nose and heel-knee-shin  There are no abnormal or extraneous movements     Romberg pt unable to perform  Gait/Stance:  Deferred due to SOB and weakness    Lab Results   Component Value Date    WBC 12 55 (H) 12/06/2022    HGB 8 6 (L) 12/06/2022    HCT 28 5 (L) 12/06/2022    MCV 90 12/06/2022     (L) 12/06/2022     Lab Results   Component Value Date    HGBA1C 5 1 12/06/2022     Lab Results   Component Value Date    ALT 23 12/05/2022    AST 33 12/05/2022    ALKPHOS 85 12/05/2022    BILITOT 0 7 12/11/2017     Lab Results   Component Value Date    GLUCOSE 129 (H) 12/11/2017    CALCIUM 8 5 12/06/2022     12/11/2017    K 3 6 12/06/2022    CO2 29 12/06/2022    CL 96 12/06/2022    BUN 29 (H) 12/06/2022    CREATININE 1 58 (H) 12/06/2022     Chol 84  LDL 36    Review of reports and notes reveal:  Independent Interpretation of images or specimens:  XR chest 1 view portable    Result Date: 12/6/2022  Prominent pulmonary vasculature and interstitial markings suggesting moderate congestive changes, stable  Workstation performed: [id number]     CT head without contrast    Addendum Date: 12/6/2022    ADDENDUM: In light of the concurrent chest CT findings, the right parieto-occipital abnormality may also represent a metastasis    Result Date: 12/6/2022  Hypodensity in the right parieto-occipital region most likely represents a chronic infarct, however no prior study is available to confirm this The study was marked in EPIC for immediate notification  Workstation performed: [id number]     CT chest without contrast    Result Date: 12/6/2022  1  Numerous new pulmonary nodules, concerning for metastatic disease 2  Mediastinal and hilar lymphadenopathy, also concerning for metastases 3  Small right pleural effusion Workstation performed: [id number]           Thank you for this consult  Total time of encounter: 70 Minutes  More than 50% of time was spent in counseling and coordination of care of patient  D/W prior symptoms, current symptoms, diagnostic testing, needs for further testing and plan recommendations    D/W medical team and attending Neurology MD

## 2022-12-06 NOTE — OCCUPATIONAL THERAPY NOTE
Occupational Therapy Note       12/06/22 1131   Note Type   Note type Screen   Cancel Reasons Patient off floor/test   Additional Comments Attempted OT eval this AM however patient received bedside echo; will follow up as schedule permits   Licensure   [place] License Number  America Vinson OTR/TIFFANY [id number]

## 2022-12-06 NOTE — ASSESSMENT & PLAN NOTE
On Trelegy, albuterol nebulizer p r n  At home  · Rhonchi right lower lobe on auscultation  On oxygen 2L, satting high 90s  · Substitute Trelegy with [place] and Incruse  · Will order albuterol p r n

## 2022-12-06 NOTE — ASSESSMENT & PLAN NOTE
Baseline hemoglobin appears to be around 7-9s  · Hemoglobin 9 1, repeat 8 1 in ED  No evidence of bleeding     · Repeat lab in the morning  · Continue iron supplement

## 2022-12-06 NOTE — ASSESSMENT & PLAN NOTE
Baseline hemoglobin appears to be around 7-9s  · Hemoglobin 9 1, repeat 8 1 in ED  No evidence of bleeding  · hemoGlobin dropped to 7 5  · Monitor hemoglobin and transfuse if less than 7  · History of symptomatic anemia during prior hospitalization  Patient had EGD which showed no sign of upper GI bleeding with mild gastritis and Candida esophagitis  Colonoscopy also showed no evidence of bleeding  Patient was recommended to follow-up outpatient with GI for capsule endoscopy

## 2022-12-06 NOTE — CASE MANAGEMENT
Case Management Assessment & Discharge Planning Note    Patient name Owen Baig  [facility] [id number]  : 1940 Date 2022       Current Admission Date: 2022  Current Admission Diagnosis:Leukocytosis   Patient Active Problem List    Diagnosis Date Noted   • Anemia 2022   • Depression with anxiety 2022   • Leukocytosis 2022   • Acute encephalopathy 2022   • Metastatic disease ([facility] ) 2022   • Elevated troponin 2022   • Left hip pain 2022   • Cervical strain 2022   • Lump of skin of left lower extremity 2022   • Primary osteoarthritis of both knees 2022   • Depression 2022   • Generalized weakness 2022   • Valvular heart disease 2022   • Pressure injury of right buttock, unstageable ([place] [address] ) 2022   • Ambulatory dysfunction 2022   • Dysphagia, pharyngoesophageal phase 2022   • Gastro-esophageal reflux disease without esophagitis 2022   • Generalized muscle weakness 2022   • History of falling 2022   • Iron deficiency anemia 2022   • Major depressive disorder, recurrent, unspecified ([place] [address] ) 2022   • Moderate to severe aortic stenosis 2022   • Other abnormalities of gait and mobility 2022   • Other specified polyneuropathies 2022   • Pulmonary hypertension due to left heart disease ([facility] ) 2022   • Presence of coronary angioplasty implant and graft 2022   • Unspecified hearing loss, unspecified ear 2022   • Chronic pain disorder 2022   • Fall 2022   • Closed fracture of right femur ([facility] ) 2022   • COPD (chronic obstructive pulmonary disease) ([facility] ) 2021   • Chronic diastolic heart failure ([facility] ) 2021   • Memory difficulty 2021   • Neuropathy 2021   • Coronary artery arteriosclerosis 2021   • Essential hypertension 2021   • Dyspnea on exertion    • Idiopathic hematuria 04/01/2021   • Kidney stone on left side 04/01/2021   • Flank pain 04/01/2021   • Obesity (BMI 30-39 9) 04/01/2021   • Chronic constipation 02/16/2021   • Vitamin D insufficiency 11/13/2020   • Symptomatic anemia 09/13/2020   • Former smoker 01/13/2020   • Nephrolithiasis 10/30/2019   • Bilateral leg edema 09/26/2019   • Hyperuricemia 07/22/2019   • Mixed simple and mucopurulent chronic bronchitis ([facility] ) 07/17/2019   • CAD (coronary artery disease) 05/13/2019   • Status post primary angioplasty with coronary stent 05/13/2019   • Stage 3a chronic kidney disease ([facility] ) 04/18/2019   • Serum total bilirubin elevated 04/08/2019   • Peripheral arteriosclerosis ([facility] ) 01/03/2019   • Pain in both lower extremities 01/31/2018   • Benign hypertension with chronic kidney disease, stage III ([facility] ) 06/27/2017   • JOO (generalized anxiety disorder) 06/07/2016   • Chronic pain syndrome 03/17/2016   • Bilateral lumbar radiculopathy 03/17/2016   • Lumbar canal stenosis 03/17/2016   • Difficulty in walking 09/11/2015   • Persistent proteinuria 01/08/2015   • Urinary incontinence 10/30/2014   • Aneurysm of abdominal aorta 04/07/2014   • Centrilobular emphysema ([facility] ) 04/07/2014   • Deep venous insufficiency 04/07/2014   • Hyperlipidemia 04/07/2014   • Pacemaker 04/07/2014   • Fibromyalgia 08/08/2013   • 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/2013   • Fecal soiling 03/26/2013   • Class 2 obesity due to excess calories without serious comorbidity with body mass index (BMI) of 36 0 to 36 9 in adult 03/26/2013   • Chronic a-fib ([facility] ) 01/23/2013   • Allergic rhinitis 10/01/2012   • Benign prostatic hyperplasia 09/04/2012   • Carpal tunnel syndrome 09/04/2012   • Moderate or severe vision impairment, one eye 09/04/2012   • Smoker 09/04/2012   • JOVI (obstructive sleep apnea) 09/04/2012   • Venous insufficiency (chronic) (peripheral) 09/04/2012      LOS (days): 0  Geometric Mean LOS (GMLOS) (days):   Days to GMLOS:     OBJECTIVE:     Current admission status: Observation  Referral Reason: Stroke    Preferred Pharmacy:   [facility] [address] [place], [address] [facility] [address], [address] [address]  Phone: [phone] Fax: [phone]     [facility], [address] [facility] [address]  [place] [address]  Phone: [phone] Fax: [place], [address]  [address] [facility] [address] [address]  Phone: [phone] Fax: [phone]    [facility] [address]  Phone: [phone] Fax: [phone]    Primary Care Provider: Lizette Estrada DO    Primary Insurance: MEDICARE  Secondary Insurance: COMMERCIAL MISCELLANEOUS    ASSESSMENT:  [address] [facility] , [address] [facility] Representative - Spouse   Primary Phone: [phone] (Home)            Readmission Root Cause  30 Day Readmission: Yes  Who directed you to return to the hospital?: Self  Did you understand whom to contact if you had questions or problems?: Yes  Did you get your prescriptions before you left the hospital?: No  Reason[de-identified] Not preferred pharmacy, Preference for own pharmacy  Were you able to get your prescriptions filled when you left the hospital?: Yes  Did you take your medications as prescribed?: Yes  Were you able to get to your follow-up appointments?: No  Reason[de-identified] Readmitted prior to appointment (PCP appt scheduled for 12/6)  During previous admission, was a post-acute recommendation made?: No  Patient was readmitted due to:  Altered mental status r/o CVA  Action Plan: Stroke workup    Patient Information  Admitted from[de-identified] Home  Mental Status: Alert  During Assessment patient was accompanied by: Not accompanied during assessment  Assessment information provided by[de-identified] Patient  Primary Caregiver: Self  Caregiver's Relationship to Patient[de-identified] Family Member  Support Systems: Spouse/significant other, Daughter  [place] of Residence: [address] [place] [address] do you live in?: [place] 45 entry access options  Select all that apply : Stairs  Number of steps to enter home : 2  Type of Current Residence: 2 story home  Upon entering residence, is there a bedroom on the main floor (no further steps)?: No  A bedroom is located on the following floor levels of residence (select all that apply):: 2nd Floor  Upon entering residence, is there a bathroom on the main floor (no further steps)?: No  Indicate which floors of current residence have a bathroom (select all the apply):: 2nd Floor  Number of steps to 2nd floor from main floor: One Flight  In the last 12 months, was there a time when you were not able to pay the mortgage or rent on time?: No  In the last 12 months, how many places have you lived?: 1  In the last 12 months, was there a time when you did not have a steady place to sleep or slept in a shelter (including now)?: No  Homeless/housing insecurity resource given?: N/A  Living Arrangements: Lives w/ Spouse/significant other, Lives w/ Daughter    Activities of Daily Living Prior to Admission  Functional Status: Independent  Completes ADLs independently?: Yes  Ambulates independently?: Yes  Does patient use assisted devices?: Yes  Assisted Devices (DME) used: BiPAP, Walker, Wheelchair  DME Company Name (respiratory supplies):  [employer]  Does patient currently own DME?: Yes  What DME does the patient currently own?: BiPAP, Walker, Wheelchair, Rollator  Does patient have a history of Outpatient Therapy (PT/OT)?: No  Does the patient have a history of Short-Term Rehab?: Yes (Hx with [facility] and [address] [place] [address] )  Does patient have a history of HHC?: Yes (Hx with [facility])  Does patient currently have [place] [address]?: No     Patient Information Continued  Income Source: Pension/detention  Does patient have prescription coverage?: Yes  Within the past 12 months, you worried that your food would run out before you got the money to buy more : Never true  Within the past 12 months, the food you bought just didn't last and you didn't have money to get more : Never true  Food insecurity resource given?: N/A  Does patient receive dialysis treatments?: No     Means of Transportation  Means of Transport to Appts[de-identified] Family transport  In the past 12 months, has lack of transportation kept you from medical appointments or from getting medications?: No  In the past 12 months, has lack of transportation kept you from meetings, work, or from getting things needed for daily living?: No  Was application for public transport provided?: N/A    DISCHARGE DETAILS:    Discharge planning discussed with[de-identified] Patient, Spouse  Freedom of Choice: Yes  Comments - Freedom of Choice: SW met with patient and spoke via phone with spouse Ilene Bernard to introduce role, complete assessment, and discuss discharge planning needs  SW reviewed current recommendation by PT for STR  Patient/spouse hopeful pt will improve over the next few days and recommendation will change to [facility]  Pt noted [facility] agency of choice is [facility] due to hx of using agency  SW reviewed STR referrals  Pt notes he has been to [facility] and [address] [place] [address]  previously  Pt does not want to return to [facility] but noted positive experience at [address] [place] [address]  Agreeable to blanket STR referrals at this time  SW will f/u with accepting facilities for choice once known  No further questions or concerns noted at this time    CM contacted family/caregiver?: Yes  Were Treatment Team discharge recommendations reviewed with patient/caregiver?: Yes  Did patient/caregiver verbalize understanding of patient care needs?: Yes  Were patient/caregiver advised of the risks associated with not following Treatment Team discharge recommendations?: Yes    Contacts  Patient Contacts: Ga Carrillo (wife)  Relationship to Patient[de-identified] Family  Contact Method: Phone  Phone Number: [phone]  Reason/Outcome: Emergency Contact, Discharge Planning    Other Referral/Resources/Interventions Provided:  Interventions: HHC, Short Term Rehab  Referral Comments: [facility] & STR referrals submitted in [facility]  Responses pending       Treatment Team Recommendation: Short Term Rehab  Discharge Destination Plan[de-identified] Short Term Rehab, Home with [address] [place] [address] (pending progress)

## 2022-12-06 NOTE — PHYSICAL THERAPY NOTE
PHYSICAL THERAPY EVALUATION/TREATMENT       12/06/22 1044   PT Last Visit   PT Visit Date 12/06/22   Note Type   Note type Evaluation   Pain Assessment   Pain Assessment Tool 0-10   Pain Score No Pain   Patient's Stated Pain Goal No pain   Hospital Pain Intervention(s) Rest;Repositioned   Multiple Pain Sites No   Restrictions/Precautions   Weight Bearing Precautions Per Order No   Other Precautions Bed Alarm; Chair Alarm; Fall Risk;Pain;Cognitive   Home Living   Type of [address] One level;Stairs to enter without rails  (2 NATHEN no rails)   Bathroom Equipment Grab bars in shower;Grab bars around toilet;Commode; Shower chair   Home Equipment Walker;Grab bars; Wheelchair-manual   Additional Comments Pt reports he was IND PTA with use of walker/wheelchair for mobility   Prior Function   Level of [place] Independent with ADLs; Independent with functional mobility; Needs assistance with IADLS   Lives With Spouse; Family;Daughter  (2 daughters)   [place] Parent Help From Family   IADLs Independent with driving;Family/Friend/Other provides meals   Falls in the last 6 months 1 to 4   Vocational Retired   General   Additional Pertinent History Pt is an 80year-old male who was admitted to the hospital on 12/5/22 due to AMS, generalized weakness   CT scan with possible chronic infarct at R parietal/ocipital lobe   Family/Caregiver Present No   Cognition   Overall Cognitive Status WFL   Arousal/Participation Cooperative   Orientation Level Oriented to person;Oriented to place;Oriented to situation  (Requires multiple attempts for date - reports it is 2022 but reporting February month)   Following Commands Follows multistep commands with increased time or repetition   Subjective   Subjective "I'm so tired"   RLE Assessment   RLE Assessment WFL  (Grossly 3- to 3/5)   LLE Assessment   LLE Assessment WFL  (Grossly 3- to 3/5)   Bed Mobility   Supine to Sit 3  Moderate assistance   Additional items Assist x 1;Verbal cues Sit to Supine 3  Moderate assistance   Additional items Assist x 2;Verbal cues;LE management   Transfers   Sit to Stand 3  Moderate assistance  (from elevated surface ; Max A attempt from lowest setting at edge of bed and unable to clear surface)   Additional items Assist x 1;Verbal cues   Stand to Sit 4  Minimal assistance   Additional items Assist x 1;Verbal cues   Stand pivot 4  Minimal assistance   Additional items Assist x 1;Verbal cues   Ambulation/Elevation   Gait pattern Wide CASSIE; Short stride; Step to; Foward flexed   Gait Assistance 4  Minimal assist   Additional items Assist x 1;Verbal cues   Assistive Device Rolling walker   Distance 2 feet to bedside chair   Stair Management Assistance Not tested   Balance   Static Sitting Fair +   Dynamic Sitting Fair   Static Standing Fair -   Dynamic Standing Poor +   Ambulatory Poor +   Activity Tolerance   Activity Tolerance Patient tolerated treatment well;Patient limited by fatigue   Nurse Made Aware yes RN Rhianna   Assessment   Prognosis Good   Problem List Decreased strength;Decreased endurance; Impaired balance;Decreased mobility; Decreased cognition; Impaired judgement;Decreased safety awareness;Pain;Decreased skin integrity   Assessment Patient seen for Physical Therapy evaluation  Patient admitted with Leukocytosis  Comorbidities affecting patient's physical performance include: Afib, anemia, anxiety, CAD, cardiac disease, CKD, COPD, depression, hypertension and obesity  Personal factors affecting patient at time of initial evaluation include: lives in 1 story house, ambulating with assistive device, stairs to enter home, inability to ambulate household distances, positive fall history, inability to perform IADLS  and inability to live alone   Prior to admission, patient was independent with functional mobility with walker/wheelchair , independent with ADLS, requiring assist for IADLS, living with spouse + daughters in a 1 level home with 2 steps to enter and ambulating household distance  Please find objective findings from Physical Therapy assessment regarding body systems outlined above with impairments and limitations including weakness, decreased ROM, impaired balance, decreased endurance, gait deviations, decreased activity tolerance, decreased functional mobility tolerance, decreased safety awareness, impaired judgement and fall risk  The Barthel Index was used as a functional outcome tool presenting with a score of Barthel Index Score: 35 today indicating marked limitations of functional mobility and ADLS  Patient's clinical presentation is currently unstable/unpredictable as seen in patient's presentation of vital sign response, increased fall risk and decreased endurance  Pt would benefit from continued Physical Therapy treatment to address deficits as defined above and maximize level of functional mobility  As demonstrated by objective findings, the assigned level of complexity for this evaluation is high  The patient's AM-PAC Basic Mobility Inpatient Short Form Raw Score is 13  A Raw score of less than or equal to 16 suggests the patient may benefit from discharge to post-acute rehabilitation services  Please also refer to the recommendation of the Physical Therapist for safe discharge planning  Goals   Patient Goals to get better, go home   STG Expiration Date 12/13/22   Short Term Goal #1 Pt will perform bed mobility with Min A ; Pt will tolerate sitting at edge of bed x 5 minutes with Fair+ sitting balance   Short Term Goal #2 Pt will ambulate x 50 feet with RW + Min A ; To assess stairs   LTG Expiration Date 12/20/22   Long Term Goal #1 Pt will perform bed moblity with Supervision ; Pt will perform bed <> chair transfer with Min A using RW   Long Term Goal #2 Pt will ambulate x 100 feet with RW + Supervision ;  Pt will negotiate x 2 steps with Min A   Plan   Treatment/Interventions ADL retraining;Functional transfer training;LE strengthening/ROM; Therapeutic exercise; Endurance training;Bed mobility;Gait training;Spoke to nursing   PT Frequency Other (Comment)  (5x/week)   Recommendation   PT Discharge Recommendation Post acute rehabilitation services   AM-PAC Basic Mobility Inpatient   Turning in Bed Without Bedrails 3   Lying on Back to Sitting on Edge of Flat Bed 2   Moving Bed to Chair 2   Standing Up From Chair 2   Walk in Room 3   Climb 3-5 Stairs 1   Basic Mobility Inpatient Raw Score 13   Basic Mobility Standardized Score 33 99   Highest Level Of Mobility   -[facility] Goal 4: Move to chair/commode   -HLM Achieved 4: Move to chair/commode   Modified [place] Scale   Modified [place] Scale 4   Barthel Index   Feeding 10   Bathing 0   Grooming Score 0   Dressing Score 5   Bladder Score 5   Bowels Score 5   Toilet Use Score 5   Transfers (Bed/Chair) Score 5   Mobility (Level Surface) Score 0   Stairs Score 0   Barthel Index Score 35   Additional Treatment Session   Start Time 1030   End Time 1040   Treatment Assessment S: "Yeah I can go back i'm tired" O/A: Pt agreeable to PT session this AM  Once patient sitting in bedside chair continues to present with fatigue, leaning forward due to sleepiness  Discussed safety concern and patient agreeable to transfer back to bed at this time (recliner not available)  Pt able to stand from chair wtih Mod A + walker + able to tolerate static standing x 2 minutes with Fair - standing balance + forward flexed posture with walker  Repositioned for comfort with all needs within place + RN/CNA present  P: pt will benefit from skilled PT to address deficits to maximize IND for safe d/c   Equipment Use walker   End of Consult   Patient Position at End of Consult Supine;Bed/Chair alarm activated; All needs within reach   [facility] Number  Danika Allison [id number]

## 2022-12-06 NOTE — ASSESSMENT & PLAN NOTE
Status post PCI with stents x 2  On Imdur, statin at home    · Continue Imdur, statin and Xarelto  · Patient was recommended to stop Plavix during prior admission

## 2022-12-06 NOTE — ASSESSMENT & PLAN NOTE
On Demadex at home    · Demadex have been on hold in the setting of sepsis  · Can resume Demadex if creatinine continues to remain stable

## 2022-12-06 NOTE — ASSESSMENT & PLAN NOTE
Patient presents with somnolence for 24 hours and generalized weakness  Patient reports a little cough and runny nose, denies sore throat/SOB/fever/chills  · Patient hospitalized in November 2022 for symptomatic anemia, hemoglobin 7 1 on admission with positive stool guaiac, received 1 unit of PRBC, underwent EGD and colonoscopy both showed no evidence of bleeding, patient was recommended outpatient capsule endoscopy which is still pending in epic  Plavix was discontinued on discharge, patient remained on Xarelto  · WBC 15 38, repeat 14 80, no bands, patient afebrile  · Lactic acid normal   · Procalcitonin 1 79  · Chest x-ray showed no evidence of pneumonia  · UA no evidence of infection   · COVID-19, flu RSV negative  · CT chest showed -  Numerous new pulmonary nodules, concerning for metastatic disease;Mediastinal and hilar lymphadenopathy, also concerning for metastases; Small right pleural effusion with adjacent basal opacity, most likely represents atelectasis  · Patient was given Zosyn in the ED and was later de-escalated to Rocephin  Later antibiotics were changed to Ancef 2 g every 8 hours  · Blood cultures 2 out of 2 growing Staphylococcus aureus  Bacterial identification panel showing MSSA  · ID input appreciated  · Echocardiogram showed EF of 60% with severe concentric hypertrophy, EF of 60% without any visible vegetations with severe attic stenosis  · Patient underwent MARY which showed EF of 55% without any large vegetation but cannot rule out small vegetation due to calcium and acoustic shadowing  · Discussed in detail with ID, family and cardiology  · Unclear source-possible from skin translocation as patient noted to have multiple scabs  · CAT scan showing multiple lung nodules-questionable septic emboli  · Pacemaker removal was recommended by ID-maya with cardiology  Pacemaker has been in place for 12 years and removal would be an extensive surgical procedure    Given multiple comorbidities cardiology recommending conservative approach and treatment with IV antibiotics with repeat blood cultures  · Follow-up repeat blood cultures  · Anticipate course of 6 weeks of antibiotics from the date of blood culture clearance    Blood cultures continue remain positive patient might need explantation of the pacemaker

## 2022-12-06 NOTE — ASSESSMENT & PLAN NOTE
Severe AS on echo in 1/2022  Underwent MARY which showed mild to moderate concentric hypertrophy with EF of 55% with moderately thickened aortic valve with severe aortic stenosis

## 2022-12-06 NOTE — ASSESSMENT & PLAN NOTE
Likely acute toxic metabolic encephalopathy due to underlying infection    Patient reports sleepy and feeling weak, oriented to place and person day of week on exam   · CT head pending  · No focal neuro deficit on exam  · ABG no hypercapnia  · Treat underlying causes  · Neuro checks for 24 hours

## 2022-12-06 NOTE — ED PROVIDER NOTES
History  Chief Complaint   Patient presents with   • Weakness - Generalized     Patient arrives via EMS for weakness at home, no sob, no nausea or vomiting, just weakness, temp upon arrival is 98 4 orally, O2 sat=97% on RA     Patient's wife states she has been extraordinarily sleepy  Patient has not gotten out of bed for 24 hours  He slept through much of the day today and did not take his medication  Patient states he just feels sleepy and tired  Denies any pain  Wife explains he has been having increased frequency of urination with some incontinence  No fever  Patient was recently in the hospital with multiple problems including renal insufficiency, elevated troponin, GI bleed with anemia  Prior to Admission Medications   Prescriptions Last Dose Informant Patient Reported? Taking? Albuterol Sulfate (albuterol, FOR EMS ONLY,) (2 5 mg/3 mL) 0 083 % nebulizer solution   Yes Yes   Sig: Take 2 5 mg by nebulization every 6 (six) hours as needed for wheezing   Cranberry 1000 MG CAPS   Yes Yes   Sig: Take 1 tablet by mouth 2 (two) times a day     DULoxetine (CYMBALTA) 60 mg delayed release capsule   No Yes   Sig: TAKE 1 CAPSULE TWICE DAILY   Dapagliflozin Propanediol 5 MG TABS   Yes Yes   Sig: Take by mouth daily   allopurinol (ZYLOPRIM) 100 mg tablet   No Yes   Sig: Take 2 tablets (200 mg total) by mouth daily   finasteride (PROSCAR) 5 mg tablet   No Yes   Sig: TAKE 1 TABLET DAILY   fluticasone-umeclidinium-vilanterol (Trelegy Ellipta) 200-62 5-25 MCG/INH AEPB inhaler   No Yes   Sig: Inhale 1 puff daily Rinse mouth after use    gabapentin (NEURONTIN) 100 mg capsule   No Yes   Sig: Take 1 capsule (100 mg total) by mouth daily at bedtime   isosorbide mononitrate (IMDUR) 30 mg 24 hr tablet   Yes Yes   mupirocin (BACTROBAN) 2 % ointment   No Yes   Sig: Apply topically 3 (three) times a day   nystatin (MYCOSTATIN) powder   No Yes   Sig: Apply topically 2 (two) times a day   pantoprazole (PROTONIX) 40 mg tablet   No Yes   Sig: Take 1 tablet (40 mg total) by mouth daily in the early morning Do not start before November 23, 2022  polyethylene glycol (MIRALAX) 17 g packet   Yes Yes   Sig: Take 17 g by mouth daily as needed   rivaroxaban (XARELTO) 15 mg tablet   No Yes   Sig: Take 1 tablet (15 mg total) by mouth daily with breakfast   simvastatin (ZOCOR) 20 mg tablet   No Yes   Sig: TAKE 1 TABLET DAILY AT     BEDTIME   torsemide 40 MG TABS   No Yes   Sig: Take 40 mg by mouth daily Do not start before November 23, 2022        Facility-Administered Medications: None       Past Medical History:   Diagnosis Date   • Arthritis    • Atrial flutter (HCC)    • Chronic kidney disease     stage 3   • Coronary artery disease     2 stents   • Fluid retention    • Gout    • Heart disease    • Heart failure (HCC)     pacemaker   • Hypertension    • Hyponatremia 04/08/2019   • Neurological disorder    • Pacemaker    • Pulmonary emphysema ([facility] [address] )    • Radiculopathy     last assessed 1/28/16    • Shortness of breath     exertion   • Sleep apnea     c pap       Past Surgical History:   Procedure Laterality Date   • ANGIOPLASTY      x2 2 stents and then replaced   • CARDIAC PACEMAKER PLACEMENT      pacemaker permanent placement dual chamber / last assessed 4/7/14 / implantation    • CARDIAC SURGERY      pacemaker   • CHOLECYSTECTOMY     • CORONARY ANGIOPLASTY WITH STENT PLACEMENT     • EPIDURAL BLOCK INJECTION N/A 05/26/2016    Procedure: BLOCK / INJECTION EPIDURAL STEROID LUMBAR  L4-5;  Surgeon: Jeffry Johnson MD;  Location: [facility] MAIN OR;  Service:    • EPIDURAL BLOCK INJECTION N/A 02/14/2019    Procedure: L4 L5 Lumbar Epidural Steroid Injection;  Surgeon: Jeffry Johnson MD;  Location: [facility] MAIN OR;  Service: Pain Management    • EYE SURGERY      cataract left   • HAND SURGERY     • HIP PINNING Left    • INSERT / REPLACE / REMOVE PACEMAKER     • KNEE ARTHROSCOPY W/ MENISCAL REPAIR Left    • KNEE SURGERY     • LUMBAR EPIDURAL INJECTION N/A 2016    Procedure: BLOCK / INJECTION LUMBAR  L4-5  (C-ARM); Surgeon: Tristen Garcia MD;  Location: [facility] MAIN OR;  Service:    • CO OPEN RX FEMUR FX+INTRAMED ELLA Right 2022    Procedure: INSERTION NAIL IM FEMUR ANTEGRADE (TROCHANTERIC); Surgeon: Navarro Jordan MD;  Location: [facility] Main OR;  Service: Orthopedics       Family History   Problem Relation Age of Onset   • Cancer Mother 80        Cancer when 80 yrs old   • Heart disease Mother    • Hypertension Mother    • Heart disease Father    • Cancer Father         Heart   46   • Diabetes Neg Hx    • Stroke Neg Hx      I have reviewed and agree with the history as documented  E-Cigarette/Vaping   • E-Cigarette Use Never User      E-Cigarette/Vaping Substances   • Nicotine No    • THC No    • CBD No    • Flavoring No    • Other No    • Unknown No      Social History     Tobacco Use   • Smoking status: Every Day     Packs/day: 0 25     Years: 50 00     Pack years: 12 50     Types: Cigarettes     Start date: 2022   • Smokeless tobacco: Former   • Tobacco comments:     quit 2021   Vaping Use   • Vaping Use: Never used   Substance Use Topics   • Alcohol use: Never   • Drug use: No       Review of Systems   Constitutional: Positive for fatigue  Negative for chills and fever  HENT: Negative for congestion and sore throat  Eyes: Negative for visual disturbance  Respiratory: Negative for cough and shortness of breath  Cardiovascular: Positive for leg swelling  Negative for chest pain  Gastrointestinal: Negative for abdominal pain and vomiting  Genitourinary: Positive for frequency  Negative for decreased urine volume and dysuria  Musculoskeletal: Negative for arthralgias and back pain  Skin: Negative for rash  Neurological: Positive for weakness  Negative for seizures, speech difficulty and headaches  Hematological: Does not bruise/bleed easily  Psychiatric/Behavioral: Positive for sleep disturbance   Negative for confusion  All other systems reviewed and are negative  Physical Exam  Physical Exam  Vitals and nursing note reviewed  Constitutional:       Appearance: Normal appearance  Comments: Patient is somewhat somnolent however was arousable to complete alertness   HENT:      Head: Normocephalic  Right Ear: External ear normal       Left Ear: External ear normal       Nose: Nose normal       Mouth/Throat:      Mouth: Mucous membranes are moist    Eyes:      Extraocular Movements: Extraocular movements intact  Conjunctiva/sclera: Conjunctivae normal    Cardiovascular:      Rate and Rhythm: Normal rate and regular rhythm  Pulses: Normal pulses  Heart sounds: Normal heart sounds  Pulmonary:      Effort: Pulmonary effort is normal       Breath sounds: Normal breath sounds  Abdominal:      Palpations: Abdomen is soft  Tenderness: There is no abdominal tenderness  Musculoskeletal:         General: Normal range of motion  Cervical back: Normal range of motion and neck supple  Right lower leg: Edema present  Left lower leg: Edema present  Skin:     General: Skin is warm and dry  Capillary Refill: Capillary refill takes less than 2 seconds  Comments: Trophic skin changes both lower extremities   Neurological:      General: No focal deficit present  Mental Status: He is oriented to person, place, and time  Psychiatric:         Mood and Affect: Mood normal          Behavior: Behavior normal       Comments: Patient is somewhat somnolent but can answer questions appropriately    He sometimes drifts off         Vital Signs  ED Triage Vitals   Temperature Pulse Respirations Blood Pressure SpO2   12/05/22 1844 12/05/22 1844 12/05/22 1844 12/05/22 1844 12/05/22 1844   98 4 °F (36 9 °C) 81 20 138/65 97 %      Temp Source Heart Rate Source Patient Position - Orthostatic VS BP Location FiO2 (%)   12/05/22 1844 12/05/22 1844 12/05/22 1844 12/05/22 1844 --   Oral Monitor Lying Right arm       Pain Score       12/06/22 0410       No Pain           Vitals:    12/07/22 1112 12/07/22 1119 12/07/22 1124 12/07/22 1127   BP: 93/50 99/53 96/55 101/57   Pulse: 69 69 69 69   Patient Position - Orthostatic VS:             Visual Acuity  Visual Acuity    Flowsheet Row Most Recent Value   L Pupil Size (mm) 3   R Pupil Size (mm) 3   L Pupil Shape Round   R Pupil Shape Round          ED Medications  Medications   allopurinol (ZYLOPRIM) tablet 200 mg (200 mg Oral Given 12/7/22 0843)   DULoxetine (CYMBALTA) delayed release capsule 60 mg (60 mg Oral Given 12/7/22 0843)   finasteride (PROSCAR) tablet 5 mg (5 mg Oral Given 12/7/22 0844)   fluticasone-vilanterol 200-25 mcg/actuation 1 puff (1 puff Inhalation Given 12/7/22 0844)   gabapentin (NEURONTIN) capsule 100 mg (100 mg Oral Given 12/6/22 2154)   isosorbide mononitrate (IMDUR) 24 hr tablet 30 mg (30 mg Oral Given 12/7/22 0844)   pantoprazole (PROTONIX) EC tablet 40 mg (40 mg Oral Given 12/7/22 0555)   polyethylene glycol (MIRALAX) packet 17 g (17 g Oral Not Given 12/6/22 0907)   rivaroxaban (XARELTO) tablet 15 mg (15 mg Oral Given 12/7/22 0843)   acetaminophen (TYLENOL) tablet 650 mg (has no administration in time range)   ondansetron (ZOFRAN) injection 4 mg (has no administration in time range)   saccharomyces boulardii (FLORASTOR) capsule 250 mg (250 mg Oral Given 12/7/22 0843)   torsemide (DEMADEX) tablet 40 mg (40 mg Oral Given 12/7/22 0843)   umeclidinium 62 5 mcg/actuation inhaler AEPB 1 puff (1 puff Inhalation Given 12/7/22 0844)   atorvastatin (LIPITOR) tablet 40 mg (has no administration in time range)   albuterol inhalation solution 2 5 mg (has no administration in time range)   ceFAZolin (ANCEF) IVPB (premix in dextrose) 2,000 mg 50 mL (2,000 mg Intravenous New Bag 12/7/22 8648)   piperacillin-tazobactam (ZOSYN) IVPB 3 375 g (3 375 g Intravenous New Bag 12/6/22 8564)   aspirin tablet 325 mg (325 mg Oral Given 12/6/22 5815) atorvastatin (LIPITOR) tablet 80 mg (80 mg Oral Given 12/6/22 0515)   famotidine (PEPCID) tablet 10 mg (10 mg Oral Given 12/6/22 0749)   potassium chloride (K-DUR,KLOR-CON) CR tablet 40 mEq (40 mEq Oral Given 12/7/22 1151)       Diagnostic Studies  Results Reviewed     Procedure Component Value Units Date/Time    Blood culture #2 [[id number]]  (Abnormal) Collected: 12/05/22 1948    Lab Status: Preliminary result Specimen: Blood from Arm, Right Updated: 12/07/22 1130     Blood Culture Staphylococcus aureus     Gram Stain Result Gram positive cocci in clusters    Blood culture #1 [[id number]]  (Abnormal) Collected: 12/05/22 1948    Lab Status: Preliminary result Specimen: Blood from Arm, Left Updated: 12/07/22 1128     Blood Culture Staphylococcus aureus     Gram Stain Result Gram positive cocci in clusters    Legionella antigen, Urine [[id number]]  (Normal) Collected: 12/06/22 2209    Lab Status: Final result Specimen: Urine, Clean Catch Updated: 12/07/22 1030     Legionella Urinary Antigen Negative    Strep Pneumoniae, Urine [[id number]]  (Normal) Collected: 12/06/22 2209    Lab Status: Final result Specimen: Urine, Clean Catch Updated: 12/07/22 1029     Strep pneumoniae antigen, urine Negative    Procalcitonin, Next Day AM Collection [[id number]]  (Abnormal) Collected: 12/07/22 0516    Lab Status: Final result Specimen: Blood from Arm, Left Updated: 12/07/22 0550     Procalcitonin 1 80 ng/ml     Blood Culture Identification Panel [[id number]]  (Abnormal) Collected: 12/05/22 1948    Lab Status: Preliminary result Specimen: Blood from Arm, Left Updated: 12/06/22 1109     Staphylococcus aureus Detected    Narrative:      Routine culture and susceptiblity to follow for confirmation  Film Array panel tests for 11 gram positive organisms, 15 gram negative organisms, 7 yeast species and 10 resistance genes       Hemoglobin A1C [[id number]] Collected: 12/06/22 0104    Lab Status: Final result Specimen: Blood Updated: 12/06/22 1100     Hemoglobin A1C 5 1 %       mg/dl     NT-BNP PRO [[id number]]  (Abnormal) Collected: 12/06/22 0104    Lab Status: Final result Specimen: Blood from Arm, Right Updated: 12/06/22 0353     NT-proBNP 14,013 pg/mL     Procalcitonin [[id number]]  (Abnormal) Collected: 12/06/22 0104    Lab Status: Final result Specimen: Blood from Arm, Right Updated: 12/06/22 0141     Procalcitonin 1 79 ng/ml     CBC and differential [[id number]]  (Abnormal) Collected: 12/06/22 0104    Lab Status: Final result Specimen: Blood from Arm, Right Updated: 12/06/22 0138     WBC 14 80 Thousand/uL      RBC 3 04 Million/uL      Hemoglobin 8 1 g/dL      Hematocrit 27 2 %      MCV 90 fL      MCH 26 6 pg      MCHC 29 8 g/dL      RDW 21 3 %      MPV 9 3 fL      Platelets 926 Thousands/uL     Narrative: This is an appended report  These results have been appended to a previously verified report      Manual Differential(PHLEBS Do Not Order) [[id number]]  (Abnormal) Collected: 12/06/22 0104    Lab Status: Final result Specimen: Blood from Arm, Right Updated: 12/06/22 0138     Segmented % 80 %      Bands % 7 %      Lymphocytes % 3 %      Monocytes % 8 %      Eosinophils, % 0 %      Basophils % 0 %      Metamyelocytes% 2 %      Absolute Neutrophils 12 88 Thousand/uL      Lymphocytes Absolute 0 44 Thousand/uL      Monocytes Absolute 1 18 Thousand/uL      Eosinophils Absolute 0 00 Thousand/uL      Basophils Absolute 0 00 Thousand/uL      Total Counted --     Smudge Cells Present     Toxic Granulation Present     RBC Morphology Present     Anisocytosis Present     Polychromasia Present     Platelet Estimate Adequate    Blood gas, arterial [[id number]]  (Abnormal) Collected: 12/06/22 0104    Lab Status: Final result Specimen: Blood, Arterial from Line, Venous Updated: 12/06/22 0117     pH, Arterial 7 437     PH ART TC 7 434     pCO2, Arterial 44 0 mm Hg      PCO2 (TC) Arterial 44 4 mm Hg      pO2, Arterial 29 0 mm Hg      PO2 (TC) Arterial 29 4 mm Hg      HCO3, Arterial 29 0 mmol/L      Base Excess, Arterial 4 3 mmol/L      O2 Content, Arterial 6 8 mL/dL      O2 HGB,Arterial  51 1 %      SOURCE Line, Venous     Temperature 98 9 Degrees Fehrenheit     Urine Microscopic [[id number]]  (Abnormal) Collected: 12/06/22 0040    Lab Status: Final result Specimen: Urine, Clean Catch Updated: 12/06/22 0059     RBC, UA 2-4 /hpf      WBC, UA 1-2 /hpf      Epithelial Cells Occasional /hpf      Bacteria, UA Moderate /hpf      Fine granular casts 0-3 /lpf      AMORPH URATES Occasional /hpf     UA w Reflex to Microscopic w Reflex to Culture [[id number]]  (Abnormal) Collected: 12/06/22 0040    Lab Status: Final result Specimen: Urine, Clean Catch Updated: 12/06/22 0045     Color, UA Yellow     Clarity, UA Clear     Specific Gravity, UA 1 010     pH, UA 6 0     Leukocytes, UA Negative     Nitrite, UA Negative     Protein, UA Trace mg/dl      Glucose, UA Negative mg/dl      Ketones, UA Negative mg/dl      Urobilinogen, UA 0 2 E U /dl      Bilirubin, UA Negative     Occult Blood, UA Trace-Intact    HS Troponin I 4hr [[id number]]  (Abnormal) Collected: 12/05/22 2323    Lab Status: Final result Specimen: Blood from Arm, Right Updated: 12/05/22 2359     hs TnI 4hr 354 ng/L      Delta 4hr hsTnI 29 ng/L     HS Troponin I 2hr [[id number]]  (Abnormal) Collected: 12/05/22 2109    Lab Status: Final result Specimen: Blood from Arm, Right Updated: 12/05/22 2141     hs TnI 2hr 298 ng/L      Delta 2hr hsTnI -27 ng/L     FLU/RSV/COVID - if FLU/RSV clinically relevant [[id number]]  (Normal) Collected: 12/05/22 1921    Lab Status: Final result Specimen: Nares from Nose Updated: 12/05/22 2049     SARS-CoV-2 Negative     INFLUENZA A PCR Negative     INFLUENZA B PCR Negative     RSV PCR Negative    Narrative:      FOR PEDIATRIC PATIENTS - copy/paste COVID Guidelines URL to browser: [email or web address]    SARS-CoV-2 assay is a Nucleic Acid Amplification assay intended for the  qualitative detection of nucleic acid from SARS-CoV-2 in nasopharyngeal  swabs  Results are for the presumptive identification of SARS-CoV-2 RNA  Positive results are indicative of infection with SARS-CoV-2, the virus  causing COVID-19, but do not rule out bacterial infection or co-infection  with other viruses  Laboratories within the [place] and its  territories are required to report all positive results to the appropriate  public health authorities  Negative results do not preclude SARS-CoV-2  infection and should not be used as the sole basis for treatment or other  patient management decisions  Negative results must be combined with  clinical observations, patient history, and epidemiological information  This test has not been FDA cleared or approved  This test has been authorized by FDA under an Emergency Use Authorization  (EUA)  This test is only authorized for the duration of time the  declaration that circumstances exist justifying the authorization of the  emergency use of an in vitro diagnostic tests for detection of SARS-CoV-2  virus and/or diagnosis of COVID-19 infection under section 564(b)(1) of  the Act, 21 U  [place] C  745GUZ-3(H)(2), unless the authorization is terminated  or revoked sooner  The test has been validated but independent review by FDA  and CLIA is pending  Test performed using [employer] GeneXpert: This RT-PCR assay targets N2,  a region unique to SARS-CoV-2  A conserved region in the E-gene was chosen  for pan-Sarbecovirus detection which includes SARS-CoV-2  According to CMS-2020-01-R, this platform meets the definition of high-throughput technology      CBC and differential [[id number]]  (Abnormal) Collected: 12/05/22 1919    Lab Status: Final result Specimen: Blood from Arm, Right Updated: 12/05/22 2035     WBC 15 38 Thousand/uL      RBC 3 41 Million/uL      Hemoglobin 9 1 g/dL      Hematocrit 30 7 %      MCV 90 fL      MCH 26 7 pg MCHC 29 6 g/dL      RDW 21 8 %      MPV 9 5 fL      Platelets 299 Thousands/uL     Narrative: This is an appended report  These results have been appended to a previously verified report      Manual Differential(PHLEBS Do Not Order) [[id number]]  (Abnormal) Collected: 12/05/22 1919    Lab Status: Final result Specimen: Blood from Arm, Right Updated: 12/05/22 2035     Segmented % 95 %      Lymphocytes % 3 %      Monocytes % 1 %      Eosinophils, % 0 %      Basophils % 0 %      Myelocytes % 1 %      Absolute Neutrophils 14 61 Thousand/uL      Lymphocytes Absolute 0 46 Thousand/uL      Monocytes Absolute 0 15 Thousand/uL      Eosinophils Absolute 0 00 Thousand/uL      Basophils Absolute 0 00 Thousand/uL      Total Counted --     Macrocytes Present     Polychromasia Present     Platelet Estimate Adequate    Comprehensive metabolic panel [[id number]]  (Abnormal) Collected: 12/05/22 1919    Lab Status: Final result Specimen: Blood from Arm, Right Updated: 12/05/22 2032     Sodium 135 mmol/L      Potassium 3 7 mmol/L      Chloride 96 mmol/L      CO2 29 mmol/L      ANION GAP 10 mmol/L      BUN 32 mg/dL      Creatinine 1 65 mg/dL      Glucose 140 mg/dL      Calcium 8 6 mg/dL      Corrected Calcium 9 7 mg/dL      AST 33 U/L      ALT 23 U/L      Alkaline Phosphatase 85 U/L      Total Protein 7 3 g/dL      Albumin 2 6 g/dL      Total Bilirubin 1 70 mg/dL      eGFR 38 ml/min/1 73sq m     Narrative:      [facility] guidelines for Chronic Kidney Disease (CKD):   •  Stage 1 with normal or high GFR (GFR > 90 mL/min/1 73 square meters)  •  Stage 2 Mild CKD (GFR = 60-89 mL/min/1 73 square meters)  •  Stage 3A Moderate CKD (GFR = 45-59 mL/min/1 73 square meters)  •  Stage 3B Moderate CKD (GFR = 30-44 mL/min/1 73 square meters)  •  Stage 4 Severe CKD (GFR = 15-29 mL/min/1 73 square meters)  •  Stage 5 End Stage CKD (GFR <15 mL/min/1 73 square meters)  Note: GFR calculation is accurate only with a steady state creatinine    Lactic acid [[id number]]  (Normal) Collected: 12/05/22 1948    Lab Status: Final result Specimen: Blood from Arm, Right Updated: 12/05/22 2015     LACTIC ACID 1 8 mmol/L     Narrative:      Result may be elevated if tourniquet was used during collection  Protime-INR [[id number]]  (Abnormal) Collected: 12/05/22 1948    Lab Status: Final result Specimen: Blood from Arm, Left Updated: 12/05/22 2007     Protime 20 4 seconds      INR 1 74    APTT [[id number]]  (Normal) Collected: 12/05/22 1948    Lab Status: Final result Specimen: Blood from Arm, Left Updated: 12/05/22 2007     PTT 32 seconds     HS Troponin 0hr (reflex protocol) [[id number]]  (Abnormal) Collected: 12/05/22 1919    Lab Status: Final result Specimen: Blood from Arm, Right Updated: 12/05/22 1955     hs TnI 0hr 325 ng/L                  CT head without contrast   Final Result by Leanne David MD (12/06 5505)   Addendum (preliminary) 1 of 1 by Leanne David MD (12/06 6715)   ADDENDUM:      In light of the concurrent chest CT findings, the right parieto-occipital    abnormality may also represent a metastasis      Final      Hypodensity in the right parieto-occipital region most likely represents a chronic infarct, however no prior study is available to confirm this      The study was marked in EPIC for immediate notification  Workstation performed: [id number]         CT chest without contrast   Final Result by Leanne David MD (12/06 6423)      1  Numerous new pulmonary nodules, concerning for metastatic disease   2  Mediastinal and hilar lymphadenopathy, also concerning for metastases   3  Small right pleural effusion               Workstation performed: [id number]         XR chest 1 view portable   Final Result by Lois Infante MD (12/06 0447)      Prominent pulmonary vasculature and interstitial markings suggesting moderate congestive changes, stable                    Workstation performed: [id number] Procedures  ECG 12 Lead Documentation Only    Date/Time: 12/5/2022 8:04 PM  Performed by: Maine Ann MD  Authorized by: Maine Ann MD     Indications / Diagnosis:  AMS  ECG reviewed by me, the ED Provider: yes    Patient location:  ED  Interpretation:     Interpretation: abnormal    Rate:     ECG rate:  79    ECG rate assessment: normal    Rhythm:     Rhythm: sinus rhythm    Ectopy:     Ectopy: none    QRS:     QRS axis:  Normal  Conduction:     Conduction: normal    ST segments:     ST segments:  Abnormal  T waves:     T waves: inverted      Inverted:  II, III, aVF, V4, V5 and V6             ED Course                               SBIRT 22yo+    Flowsheet Row Most Recent Value   SBIRT (25 yo +)    In order to provide better care to our patients, we are screening all of our patients for alcohol and drug use  Would it be okay to ask you these screening questions? No Filed at: 12/05/2022 2300                    MDM  Number of Diagnoses or Management Options  Altered mental status  Generalized weakness  Diagnosis management comments: Suspect urinary sepsis    Check metabolic profile      Disposition  Final diagnoses:   Generalized weakness   Altered mental status     Time reflects when diagnosis was documented in both MDM as applicable and the Disposition within this note     Time User Action Codes Description Comment    12/6/2022  1:44 AM Boo Doctor A Add [R53 1] Generalized weakness     12/6/2022  1:44 AM Boo Doctor A Add [R41 82] Altered mental status     12/6/2022  1:44 AM Boo Doctor A Modify [R53 1] Generalized weakness     12/6/2022  1:44 AM Boo Doctor A Modify [R41 82] Altered mental status     12/6/2022  4:32 AM Lemon Lavender Add [G93 40] Acute encephalopathy     12/6/2022 12:15 PM Ron Ellis Add [R78 81] Gram-positive bacteremia     12/6/2022  2:22 PM Ron Ellis Add [C79 9] Metastatic disease [facility])       ED Disposition     ED Disposition   Admit    Condition Stable    Date/Time   Tue Dec 6, 2022  1:44 AM    Comment   Case was discussed with hospitalist and the patient's admission status was agreed to be Admission Status: observation status to the service of Dr Coreen Li   Follow-up Information    None         Current Discharge Medication List      CONTINUE these medications which have NOT CHANGED    Details   Albuterol Sulfate (albuterol, FOR EMS ONLY,) (2 5 mg/3 mL) 0 083 % nebulizer solution Take 2 5 mg by nebulization every 6 (six) hours as needed for wheezing      allopurinol (ZYLOPRIM) 100 mg tablet Take 2 tablets (200 mg total) by mouth daily  Qty: 180 tablet, Refills: 3    Associated Diagnoses: Hyperuricemia      Cranberry 1000 MG CAPS Take 1 tablet by mouth 2 (two) times a day  Dapagliflozin Propanediol 5 MG TABS Take by mouth daily      DULoxetine (CYMBALTA) 60 mg delayed release capsule TAKE 1 CAPSULE TWICE DAILY  Qty: 60 capsule, Refills: 1    Associated Diagnoses: Radiculopathy of lumbar region      finasteride (PROSCAR) 5 mg tablet TAKE 1 TABLET DAILY  Qty: 90 tablet, Refills: 1    Associated Diagnoses: Prostate disorder      fluticasone-umeclidinium-vilanterol (Trelegy Ellipta) 200-62 5-25 MCG/INH AEPB inhaler Inhale 1 puff daily Rinse mouth after use    Qty: 180 blister, Refills: 3    Associated Diagnoses: Centrilobular emphysema (HCC)      gabapentin (NEURONTIN) 100 mg capsule Take 1 capsule (100 mg total) by mouth daily at bedtime  Qty: 30 capsule, Refills: 1    Associated Diagnoses: Radiculopathy of lumbar region      isosorbide mononitrate (IMDUR) 30 mg 24 hr tablet       mupirocin (BACTROBAN) 2 % ointment Apply topically 3 (three) times a day  Qty: 22 g, Refills: 0    Associated Diagnoses: Wound of skin      nystatin (MYCOSTATIN) powder Apply topically 2 (two) times a day  Qty: 15 g, Refills: 0    Associated Diagnoses: Esophageal candidiasis (HCC)      pantoprazole (PROTONIX) 40 mg tablet Take 1 tablet (40 mg total) by mouth daily in the early morning Do not start before November 23, 2022  Qty: 30 tablet, Refills: 0    Associated Diagnoses: Esophageal candidiasis (HCC)      polyethylene glycol (MIRALAX) 17 g packet Take 17 g by mouth daily as needed      rivaroxaban (XARELTO) 15 mg tablet Take 1 tablet (15 mg total) by mouth daily with breakfast    Associated Diagnoses: Chronic a-fib (HCC)      simvastatin (ZOCOR) 20 mg tablet TAKE 1 TABLET DAILY AT     BEDTIME  Qty: 90 tablet, Refills: 1    Associated Diagnoses: Coronary artery arteriosclerosis      torsemide 40 MG TABS Take 40 mg by mouth daily Do not start before November 23, 2022  Qty: 30 tablet, Refills: 0    Associated Diagnoses: Acute on chronic diastolic heart failure ([facility] [address] )             No discharge procedures on file      PDMP Review       Value Time User    PDMP Reviewed  Yes 2/19/2022 10:29 AM Fritz Goldman 10 Zach Logan          ED Provider  Electronically Signed by           José Luis Richard MD  12/07/22 1314

## 2022-12-06 NOTE — ASSESSMENT & PLAN NOTE
Mild troponin elevation most likely non MI troponin elevation secondary to underlying causes    · EKG showed paced rhythm  · Telemetry

## 2022-12-06 NOTE — ASSESSMENT & PLAN NOTE
Wt Readings from Last 3 Encounters:   12/05/22 120 kg (265 lb)   11/22/22 121 kg (265 lb 10 5 oz)   10/20/22 120 kg (265 lb)     On Demadex 40 mg p o  Daily at home  · 2D echo in January 2022 showed normal EF, 65%, moderate concentric hypertrophy, dilated atriums, severe AS, mild to moderate AR, moderate MS  · Chest x-ray appears unremarkable to me, pending final read  · CT chest showed numerous new pulmonary nodules with mediastinal and hilar lymphadenopathy small right pleural effusion  · 1-2+ lower leg edema on exam, albumin 2 6    · BNP was elevated at 14,000  Demadex has been on hold in the setting of this

## 2022-12-06 NOTE — ASSESSMENT & PLAN NOTE
Lab Results   Component Value Date    EGFR 38 12/05/2022    EGFR 38 11/22/2022    EGFR 43 11/21/2022    CREATININE 1 65 (H) 12/05/2022    CREATININE 1 63 (H) 11/22/2022    CREATININE 1 49 (H) 11/21/2022     Baseline creatinine appears to be around 1 3-1 6s    · Creatinine stable  · Monitor

## 2022-12-06 NOTE — ASSESSMENT & PLAN NOTE
Patient presents with somnolence for 24 hours and generalized weakness  Patient reports a little cough and runny nose, denies sore throat/SOB/fever/chills  · Patient hospitalized in November 2022 for symptomatic anemia, hemoglobin 7 1 on admission with positive stool guaiac, received 1 unit of PRBC, underwent EGD and colonoscopy both showed no evidence of bleeding, patient was recommended outpatient capsule endoscopy which is still pending in epic  Plavix was discontinued on discharge, patient remained on Xarelto    · WBC 15 38, repeat 14 80, no bands, patient afebrile  · Lactic acid normal   · Procalcitonin 1 79  · Chest x-ray appears unremarkable, pending final read  · Rhonchi on right lower lobe  · UA no evidence of infection   · COVID-19, flu RSV negative  · CT chest pending  · Received Zosyn in ED for possible pneumonia, will deescalate to Rocephin  · Check urine antigens  · Follow-up blood cultures  · Repeat CBC with diff, procalcitonin in the morning  · PT OT eval treat

## 2022-12-06 NOTE — ASSESSMENT & PLAN NOTE
Wt Readings from Last 3 Encounters:   12/05/22 120 kg (265 lb)   11/22/22 121 kg (265 lb 10 5 oz)   10/20/22 120 kg (265 lb)     On Demadex 40 mg p o  Daily at home  · 2D echo in January 2022 showed normal EF, 65%, moderate concentric hypertrophy, dilated atriums, severe AS, mild to moderate AR, moderate MS  · Chest x-ray appears unremarkable to me, pending final read  · CT chest pending  · 1-2+ lower leg edema on exam, albumin 2 6    · Check proBNP  · Hold Demadex for 1 day in view of leukocytosis  · Daily weight, I&Os

## 2022-12-06 NOTE — SPEECH THERAPY NOTE
Speech-Language Pathology Bedside Swallow Evaluation      Patient Name: Glory Crigler    VMCSO'N Date: 12/6/2022     Problem List  Principal Problem:    Leukocytosis  Active Problems:    Benign hypertension with chronic kidney disease, stage III (HCC)    Benign prostatic hyperplasia    Chronic a-fib (HCC)    Hyperlipidemia    JOVI (obstructive sleep apnea)    Stage 3a chronic kidney disease (HCC)    CAD (coronary artery disease)    Obesity (BMI 30-39  9)    COPD (chronic obstructive pulmonary disease) (HCC)    Chronic diastolic heart failure (HCC)    Gastro-esophageal reflux disease without esophagitis    Moderate to severe aortic stenosis    Elevated troponin    Anemia    Depression with anxiety    Acute encephalopathy    Metastatic disease (HCC)      Past Medical History  Past Medical History:   Diagnosis Date   • Arthritis    • Atrial flutter (HCC)    • Chronic kidney disease     stage 3   • Coronary artery disease     2 stents   • Fluid retention    • Gout    • Heart disease    • Heart failure (HCC)     pacemaker   • Hypertension    • Hyponatremia 04/08/2019   • Neurological disorder    • Pacemaker    • Pulmonary emphysema ([facility] [address] )    • Radiculopathy     last assessed 1/28/16    • Shortness of breath     exertion   • Sleep apnea     c pap       Past Surgical History  Past Surgical History:   Procedure Laterality Date   • ANGIOPLASTY      x2 2 stents and then replaced   • CARDIAC PACEMAKER PLACEMENT      pacemaker permanent placement dual chamber / last assessed 4/7/14 / implantation    • CARDIAC SURGERY      pacemaker   • CHOLECYSTECTOMY     • CORONARY ANGIOPLASTY WITH STENT PLACEMENT     • EPIDURAL BLOCK INJECTION N/A 05/26/2016    Procedure: BLOCK / INJECTION EPIDURAL STEROID LUMBAR  L4-5;  Surgeon: Jeffry Johnson MD;  Location: [facility] MAIN OR;  Service:    • EPIDURAL BLOCK INJECTION N/A 02/14/2019    Procedure: L4 L5 Lumbar Epidural Steroid Injection;  Surgeon: Jeffry Johnson MD;  Location: [facility] MAIN OR; Service: Pain Management    • EYE SURGERY      cataract left   • HAND SURGERY     • HIP PINNING Left    • INSERT / REPLACE / REMOVE PACEMAKER     • KNEE ARTHROSCOPY W/ MENISCAL REPAIR Left    • KNEE SURGERY     • LUMBAR EPIDURAL INJECTION N/A 03/17/2016    Procedure: BLOCK / INJECTION LUMBAR  L4-5  (C-ARM); Surgeon: Radha Andrade MD;  Location: [facility] MAIN OR;  Service:    • NH OPEN RX FEMUR FX+INTRAMED ELLA Right 01/31/2022    Procedure: INSERTION NAIL IM FEMUR ANTEGRADE (TROCHANTERIC); Surgeon: Ana Alvarez MD;  Location: [facility] Main OR;  Service: Orthopedics       Summary   Pt presented as lethargic but arouseable with no s/s dysarthria and functional appearing oral and pharyngeal stage swallowing skills with soft food and individual sips of liquid (+cough noted w/successive sips)  Family present and aware (and instructed to limit rate/amt of intake)    Recommended Diet: soft/level 3 diet and thin liquids   Recommended Form of Meds: as best tolerated  Aspiration precautions: slow rate of feeding and single sips  Other Recommendations: Continue frequent oral care        Current Medical Status  Gladys Santacruz is a 80 y o  male with PMH of anemia, CKD, hypertension, AFib, pacemaker insertion, COPD, CHF, CAD, BPH, GERD, hyperlipidemia, aortic stenosis, sleep apnea, obesity who presents with somnolence for 24 hours and generalized weakness  DX:   -leukocytosis w/R LL rhonci (CT chest showed -numerous new pulmonary nodules, concerning for metastatic disease;Mediastinal and hilar lymphadenopathy, also concerning for metastases;  Small right pleural effusion with adjacent basal opacity, most likely represents atelectasis)  - acute encephalopathy (CT head showed - Hypodensity in the right parieto-occipital region most likely represents a chronic infarct, however no prior study is available to confirm this)  -Metastatic disease (chest as above, unable to obtain CT abdomen pelvis with contrast to assess for primary malignancy tonight due to CKD and CHF)  -anemia, elevated troponin, mod/severe AS, depression/anxiety, chronic diastolic heart failure, COPD, obesity, CAD, Stage 3 CKD, JOVI, chronic afib  PT, OT & SLP consulted at this time  Current Precautions:  Fall      Allergies:  No known food allergies    Past medical history:  Please see H&P for details    Social/Education/Vocational Hx:  Pt lives with family  Wife and 1 dtr present (dtr Darral Nixon called during session)    Swallow Information   Current Risks for Dysphagia & Aspiration: general debility and report of rapid intake  Baseline Diet: regular diet and thin liquids      Baseline Assessment   Behavior/Cognition: lethargic and but arouseable, O to self, family, place, yr (not day/date)  Speech/Language Status: able to follow commands and express needs  No s/s dysphonia or dysarthria but slow to process/respond  Patient Positioning: upright in bed  Pain Status/Interventions/Response to Interventions:  No report of or nonverbal indications of pain  Swallow Mechanism Exam  Facial: symmetrical  Labial: WFL  Lingual: WFL  Velum: symmetrical  Mandible: adequate ROM  Dentition: adequate  Vocal quality:clear/adequate   Respiratory Status: on RA    Consistencies Assessed and Performance   Materials administered included diet (lemon/lime) soda, mashed potatoes, meatloaf    Oral Stage: WFL  Mastication was adequate with the materials administered today  Bolus formation and transfer were functional with no significant oral residue noted  No overt s/s reduced oral control  Pharyngeal Stage: suspected minimal impairment or risk  Swallow Mechanics:  Swallowing initiation appeared prompt  Laryngeal rise was palpated and judged to be within functional limits  No coughing, throat clearing, change in vocal quality or respiratory status noted today with food or individual sips of liquid    Cough x1 with successive sips     Esophageal Concerns: early satiety today    Summary and Recommendations (see above)    Results Reviewed with: patient, RN and family     Treatment Recommended: yes brief tx recommended     Frequency of treatment: 2x wkly    Patient Stated Goal: "I want to go home"    Dysphagia LTG  -Patient will demonstrate safe and effective oral intake (without overt s/s significant oral/pharyngeal dysphagia including s/s penetration or aspiration) for the highest appropriate diet level       Short Term Goals:  -Pt will tolerate Dysphagia 3/advanced (dental soft) diet and honey thick nectar thick thin liquid with no significant s/s oral or pharyngeal dysphagia across 1-2 diagnostic session/s     -Patient will tolerate trials of upgraded food  texture with no significant s/s of oral or pharyngeal dysphagia including aspiration across 1-3 diagnostic sessions     -Patient will utilize trained compensatory strategies (eg   controlled bite/sip size, controlled rate, ”prep set”, ) with 90% accuracy to eliminate overt s/s penetration/aspiration with the least restrictive food/liquid consistencies    Emilee Spence [address] [facility] [id number]

## 2022-12-06 NOTE — CONSULTS
Pulmonary Consult Note     Assessment:   New bilateral pulmonary nodules in a current smoker on background of emphysema  Nodules are concerning for metastatic disease, although in the setting of gram positive bacteremia septic emboli, septic pulmonary emboli is possible w reactive bilateral adenopathy given the overall clinical picture, timeframe, and risk factors with 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  In addition recent CT abdomen/pelvis in November does not show evidence of suspicious mass or lesions in addition to his negative colonoscopy  Recommendations:  - agree with MARY for workup for possible infectious endocarditis as per ID recs  - suggest IR guided biopsy of peripheral nodules to evaluate for metastatic disease  Referring Provider: Calvin Bass MD      Chief Complaint: Pulmonary Nodules     HPI:   79 yo M, current smoker with PPM, COPD, CHF, CKD consulted for evaluation of bilateral pulmonary nodules  The patient was initially admitted after presenting to the ED for lethargy and weakness associated with subjective fevers  Symptoms were noticed by family  The patient was found to have gram positive cocci in on blood cultures and has been empirically treated with abx with ID guidance  Workup include a CT chest showing bilateral pulmonary nodules which were not present compared to a CT a year ago  He also had a CTH as part of his encephaloapthy workup showing a hypodensity in the R parietooccipital region  The patient reports a 10lb weight loss over the last month, unclear if intentional  He denies a history of cancer  He had a colonoscopy appx 1 month ago, which he reports was unremarkable  Currently he feels better  He mentions 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       Social History:   Social History     Tobacco Use   • Smoking status: Every Day     Packs/day: 0 25     Years: 50 00     Pack years: 12 50     Types: Cigarettes     Start date: 1/1/2022 • Smokeless tobacco: Former   • Tobacco comments:     quit 02/14/2021   Vaping Use   • Vaping Use: Never used   Substance Use Topics   • Alcohol use: Never   • Drug use: No       Pmhx:   Past Medical History:   Diagnosis Date   • Arthritis    • Atrial flutter ([facility] [address] )    • Chronic kidney disease     stage 3   • Coronary artery disease     2 stents   • Fluid retention    • Gout    • Heart disease    • Heart failure (HCC)     pacemaker   • Hypertension    • Hyponatremia 04/08/2019   • Neurological disorder    • Pacemaker    • Pulmonary emphysema ([facility] [address] )    • Radiculopathy     last assessed 1/28/16    • Shortness of breath     exertion   • Sleep apnea     c pap        Surgical history:   Past Surgical History:   Procedure Laterality Date   • ANGIOPLASTY      x2 2 stents and then replaced   • CARDIAC PACEMAKER PLACEMENT      pacemaker permanent placement dual chamber / last assessed 4/7/14 / implantation    • CARDIAC SURGERY      pacemaker   • CHOLECYSTECTOMY     • CORONARY ANGIOPLASTY WITH STENT PLACEMENT     • EPIDURAL BLOCK INJECTION N/A 05/26/2016    Procedure: BLOCK / INJECTION EPIDURAL STEROID LUMBAR  L4-5;  Surgeon: Claudean Redman, MD;  Location: [facility] MAIN OR;  Service:    • EPIDURAL BLOCK INJECTION N/A 02/14/2019    Procedure: L4 L5 Lumbar Epidural Steroid Injection;  Surgeon: Claudean Redman, MD;  Location: [facility] MAIN OR;  Service: Pain Management    • EYE SURGERY      cataract left   • HAND SURGERY     • HIP PINNING Left    • INSERT / REPLACE / REMOVE PACEMAKER     • KNEE ARTHROSCOPY W/ MENISCAL REPAIR Left    • KNEE SURGERY     • LUMBAR EPIDURAL INJECTION N/A 03/17/2016    Procedure: BLOCK / INJECTION LUMBAR  L4-5  (C-ARM); Surgeon: Claudean Redman, MD;  Location: [facility] MAIN OR;  Service:    • [place] OPEN RX FEMUR FX+INTRAMED ELLA Right 01/31/2022    Procedure: INSERTION NAIL IM FEMUR ANTEGRADE (TROCHANTERIC);   Surgeon: Roseanne Cameron MD;  Location: [facility] Main OR;  Service: Orthopedics        Family History: Family History   Problem Relation Age of Onset   • Cancer Mother 80        Cancer when 80 yrs old   • Heart disease Mother    • Hypertension Mother    • Heart disease Father    • Cancer Father         Heart   46   • Diabetes Neg Hx    • Stroke Neg Hx         Allergies: No Known Allergies    Current Meds:   No current facility-administered medications on file prior to encounter  Current Outpatient Medications on File Prior to Encounter   Medication Sig Dispense Refill   • Albuterol Sulfate (albuterol, FOR EMS ONLY,) (2 5 mg/3 mL) 0 083 % nebulizer solution Take 2 5 mg by nebulization every 6 (six) hours as needed for wheezing     • allopurinol (ZYLOPRIM) 100 mg tablet Take 2 tablets (200 mg total) by mouth daily 180 tablet 3   • Cranberry 1000 MG CAPS Take 1 tablet by mouth 2 (two) times a day  • Dapagliflozin Propanediol 5 MG TABS Take by mouth daily     • DULoxetine (CYMBALTA) 60 mg delayed release capsule TAKE 1 CAPSULE TWICE DAILY 60 capsule 1   • finasteride (PROSCAR) 5 mg tablet TAKE 1 TABLET DAILY 90 tablet 1   • fluticasone-umeclidinium-vilanterol (Trelegy Ellipta) 200-62 5-25 MCG/INH AEPB inhaler Inhale 1 puff daily Rinse mouth after use   180 blister 3   • gabapentin (NEURONTIN) 100 mg capsule Take 1 capsule (100 mg total) by mouth daily at bedtime 30 capsule 1   • isosorbide mononitrate (IMDUR) 30 mg 24 hr tablet      • mupirocin (BACTROBAN) 2 % ointment Apply topically 3 (three) times a day 22 g 0   • nystatin (MYCOSTATIN) powder Apply topically 2 (two) times a day 15 g 0   • pantoprazole (PROTONIX) 40 mg tablet Take 1 tablet (40 mg total) by mouth daily in the early morning Do not start before 2022  30 tablet 0   • polyethylene glycol (MIRALAX) 17 g packet Take 17 g by mouth daily as needed     • rivaroxaban (XARELTO) 15 mg tablet Take 1 tablet (15 mg total) by mouth daily with breakfast     • simvastatin (ZOCOR) 20 mg tablet TAKE 1 TABLET DAILY AT     BEDTIME 90 tablet 1   • torsemide 40 MG TABS Take 40 mg by mouth daily Do not start before November 23, 2022  30 tablet 0       Review of Systems: all review of system negative except as stated in HPI     Vital Signs:   /52   Pulse 74   Temp 98 8 °F (37 1 °C)   Resp 16   Ht 6' 2" (1 88 m)   Wt 120 kg (265 lb)   SpO2 95%   BMI 34 02 kg/m²     Physical Exam:   General: Not in distress, sitting upright drinking water   Eyes: anicteric   Oral: no thrush, no ulcers   Cardiac: s1s2 rrr, no m/r/g   Lungs: cta b/l , no wheezing or rales   MSK: no peripheral edema  Skin: There are ecchymoses on his extremities   Neuro: Awake and alert, able to participate in short conversations  Able to provide history        Pertinent labs and imaging reviewed:   CT chest reviewed with multiple new bilateral pulmonary nodules associated with paratracheal, hilar, and subcarinal lymphadenopathy  Bilateral trace pleural effusions

## 2022-12-06 NOTE — CONSULTS
Consultation - Infectious Disease   Walker García 80 y o  male MRN: [id number]  Unit/Bed#: 2 [address] Encounter: [id number]      IMPRESSION & RECOMMENDATIONS:     1  Staph aureus bacteremia  POA, 2/2 admission blood cultures growing GPC in clusters, identified as MSSA on BCID  Unclear source, may be skin translocation as the patient has multiple scabs on his arms and legs  TTE with AV and MV thickening, no clear vegetations  This is complicated by the presence of a PPM  PPM removal is recommended in the setting of staph bacteremia, however he is likely not a good surgical candidate at this time  Will assess for lead infection, vegetations with MARY first  CT chest with numerous pulmonary nodules, could be septic emboli although don't have typical appearance and bilateral adenopathy is present    -check MARY   -continue IV Cefazolin 2g q8hr   -follow up blood cultures   -repeat blood cultures tomorrow   -may need transfer for EP evaluation pending results of MARY and clinical course    2  Weakness, encephalopathy  Likely toxic metabolic due to infection as above, possible malignancy  CTH showed a hypodensity in the right parieto-occipital region most likely a chronic infarct or metastatic lesion  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  Neurology has low concern for a stroke    -treatment of infection as above   -further work up for malignancy per primary team   -monitor mental status    3  Leukocytosis  Likely due to #1 above  Patient is afebrile, hemodynamically stable    -antibiotics as above   -monitor WBC count, fever curve    4  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  Complicated by staph aureus bacteremia  TTE no vegetations  Will need further evaluation with MARY    -recommend MARY    5  Pulmonary nodules and adenopathy  CT chest shows numerous new pulmonary nodules and mediastinal and hilar lymphadenopathy, concerning for metastases  Does not look like septic emboli   Patient had a colonoscopy last month without abnormalities  He is a long term smoker  -pulmonary consulted    6  CKD  Creatinine stable at baseline  Antibiotics dose adjusted as needed  I have discussed the above management plan in detail with the patient and Dr Ching Monae  ID will follow  I have performed an extensive review of the medical records in Epic including review of the notes, radiographs, and laboratory results     HISTORY OF PRESENT ILLNESS:  Reason for Consult: MSSA bacteremia  HPI: Miladys Hensley is an 80year old man with a history of PPM insertion, COPD, CHF, CKD, longstanding tobacco abuse who presented to the [facility] ED 12/5/22 with several days of worsening lethargy and weakness at home  The patient states he was too weak to get out of bed  He lives with his wife and daughter  He reported a cough and runny nose, but no fever, chills, abdominal pain, dysuria, shortness of breath  He has 10 lb intentional weight loss over the past several weeks  On presentation, the patient was afebrile, with a WBC count of 15 38  COVID/flu/RSV PCR was negative  CT chest shows numerous new pulmonary nodules and mediastinal and hilar lymphadenopathy, concerning for metastases  CTH showed a hypodensity in the right parieto-occipital region most likely a chronic infarct or metastatic lesion  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  He recently underwent colonoscopy with no reported abnormalities  The patient was initially started on Zosyn followed by ceftriaxone for possible infection; now 2/2 sets admission blood cultures are growing GPC clusters, identified as MSSA on BCID  Antibiotics were switched to Cefazolin, ID is consulted for further evaluation  TTE today showed aortic and mitral valve thickening but no vegetations  REVIEW OF SYSTEMS:  A complete review of systems is negative other than that noted in the HPI      PAST MEDICAL HISTORY:  Past Medical History:   Diagnosis Date   • Arthritis    • Atrial flutter ([facility] [address] ) • Chronic kidney disease     stage 3   • Coronary artery disease     2 stents   • Fluid retention    • Gout    • Heart disease    • Heart failure [facility])     pacemaker   • Hypertension    • Hyponatremia 04/08/2019   • Neurological disorder    • Pacemaker    • Pulmonary emphysema ([facility] [address] )    • Radiculopathy     last assessed 1/28/16    • Shortness of breath     exertion   • Sleep apnea     c pap     Past Surgical History:   Procedure Laterality Date   • ANGIOPLASTY      x2 2 stents and then replaced   • CARDIAC PACEMAKER PLACEMENT      pacemaker permanent placement dual chamber / last assessed 4/7/14 / implantation    • CARDIAC SURGERY      pacemaker   • CHOLECYSTECTOMY     • CORONARY ANGIOPLASTY WITH STENT PLACEMENT     • EPIDURAL BLOCK INJECTION N/A 05/26/2016    Procedure: BLOCK / INJECTION EPIDURAL STEROID LUMBAR  L4-5;  Surgeon: Jessica Lee MD;  Location: [place] [address] MAIN OR;  Service:    • EPIDURAL BLOCK INJECTION N/A 02/14/2019    Procedure: L4 L5 Lumbar Epidural Steroid Injection;  Surgeon: Jessica Lee MD;  Location: [place] [address] MAIN OR;  Service: Pain Management    • EYE SURGERY      cataract left   • HAND SURGERY     • HIP PINNING Left    • INSERT / REPLACE / REMOVE PACEMAKER     • KNEE ARTHROSCOPY W/ MENISCAL REPAIR Left    • KNEE SURGERY     • LUMBAR EPIDURAL INJECTION N/A 03/17/2016    Procedure: BLOCK / INJECTION LUMBAR  L4-5  (C-ARM); Surgeon: Jessica Lee MD;  Location: [facility] MAIN OR;  Service:    • MN OPEN RX FEMUR FX+INTRAMED ELLA Right 01/31/2022    Procedure: INSERTION NAIL IM FEMUR ANTEGRADE (TROCHANTERIC);   Surgeon: Tee Mccoy MD;  Location: [facility] Main [facility];  Service: Orthopedics       FAMILY HISTORY:  Non-contributory    SOCIAL HISTORY:  Social History   Social History     Substance and Sexual Activity   Alcohol Use Never     Social History     Substance and Sexual Activity   Drug Use No     Social History     Tobacco Use   Smoking Status Every Day   • Packs/day: 0 25   • Years: 50 00   • Pack years: 12 50   • Types: Cigarettes   • Start date: 2022   Smokeless Tobacco Former   Tobacco Comments    quit 2021       ALLERGIES:  No Known Allergies    MEDICATIONS:  All current active medications have been reviewed  PHYSICAL EXAM:  Temp:  [98 4 °F (36 9 °C)-99 4 °F (37 4 °C)] 98 8 °F (37 1 °C)  HR:  [70-84] 74  Resp:  [16-27] 16  BP: (103-138)/(52-73) 103/52  SpO2:  [91 %-100 %] 95 %  Temp (24hrs), Av 9 °F (37 2 °C), Min:98 4 °F (36 9 °C), Max:99 4 °F (37 4 °C)  Current: Temperature: 98 8 °F (37 1 °C)    Intake/Output Summary (Last 24 hours) at 2022 1539  Last data filed at 2022 7608  Gross per 24 hour   Intake --   Output 975 ml   Net -975 ml       General Appearance:  Lethargic, falling asleep during evaluation  He is non toxic appearing but chronically ill    Head:  Normocephalic, without obvious abnormality, atraumatic   Eyes:  Conjunctiva pink and sclera anicteric, both eyes   Nose: Nares normal, mucosa normal, no drainage   Throat: Oropharynx moist without lesions   Neck: Supple, symmetrical, no adenopathy   Back:   Symmetric, no curvature, ROM normal   Lungs:   Clear to auscultation bilaterally, respirations unlabored   Chest Wall:  No tenderness or deformity   Heart:  RRR, no murmur   Abdomen:   Soft, non-tender, non-distended, positive bowel sounds    Extremities: No cyanosis, clubbing or edema   Skin: Multiple ecchymoses, scabs on his arms and left knee   Lymph nodes: Cervical, supraclavicular nodes normal   Neurologic: Lethargic but arousable  Answering some questions       LABS, IMAGING, & OTHER STUDIES:  Lab Results:  I have personally reviewed pertinent labs    Results from last 7 days   Lab Units 22  0834 22  0104 22  1919   WBC Thousand/uL 12 55* 14 80* 15 38*   HEMOGLOBIN g/dL 8 6* 8 1* 9 1*   PLATELETS Thousands/uL 144* 158 187     Results from last 7 days   Lab Units 22  0834 22  1919   SODIUM mmol/L 133* 135   POTASSIUM mmol/L 3 6 3 7 CHLORIDE mmol/L 96 96   CO2 mmol/L 29 29   BUN mg/dL 29* 32*   CREATININE mg/dL 1 58* 1 65*   EGFR ml/min/1 73sq m 40 38   CALCIUM mg/dL 8 5 8 6   AST U/L  --  33   ALT U/L  --  23   ALK PHOS U/L  --  85     Results from last 7 days   Lab Units 12/05/22  1948   GRAM STAIN RESULT  Gram positive cocci in clusters*  Gram positive cocci in clusters*     Results from last 7 days   Lab Units 12/06/22  0104   PROCALCITONIN ng/ml 1 79*                   Imaging Studies:   I have personally reviewed pertinent imaging study reports and images in PACS  CT chest: numerous new pulmonary nodules and mediastinal and hilar lymphadenopathy, concerning for metastases    Other Studies:   I have personally reviewed pertinent reports

## 2022-12-06 NOTE — PLAN OF CARE
Problem: Potential for Falls  Goal: Patient will remain free of falls  Description: INTERVENTIONS:  - Educate patient/family on patient safety including physical limitations  - Instruct patient to call for assistance with activity   - Consult OT/PT to assist with strengthening/mobility   - Keep Call bell within reach  - Keep bed low and locked with side rails adjusted as appropriate  - Keep care items and personal belongings within reach  - Initiate and maintain comfort rounds  - Make Fall Risk Sign visible to staff  - Offer Toileting every 2 Hours, in advance of need  - Initiate/Maintain bed alarm  - Obtain necessary fall risk management equipment: urinal  - Apply yellow socks and bracelet for high fall risk patients  - Consider moving patient to room near nurses station  Outcome: Progressing     Problem: RESPIRATORY - ADULT  Goal: Achieves optimal ventilation and oxygenation  Description: INTERVENTIONS:  - Assess for changes in respiratory status  - Assess for changes in mentation and behavior  - Position to facilitate oxygenation and minimize respiratory effort  - Oxygen administered by appropriate delivery if ordered  - Initiate smoking cessation education as indicated  - Encourage broncho-pulmonary hygiene including cough, deep breathe, Incentive Spirometry  - Assess the need for suctioning and aspirate as needed  - Assess and instruct to report SOB or any respiratory difficulty  - Respiratory Therapy support as indicated  Outcome: Progressing     Problem: MOBILITY - ADULT  Goal: Maintain or return to baseline ADL function  Description: INTERVENTIONS:  -  Assess patient's ability to carry out ADLs; assess patient's baseline for ADL function and identify physical deficits which impact ability to perform ADLs (bathing, care of mouth/teeth, toileting, grooming, dressing, etc )  - Assess/evaluate cause of self-care deficits   - Assess range of motion  - Assess patient's mobility; develop plan if impaired  - Assess patient's need for assistive devices and provide as appropriate  - Encourage maximum independence but intervene and supervise when necessary  - Involve family in performance of ADLs  - Assess for home care needs following discharge   - Consider OT consult to assist with ADL evaluation and planning for discharge  - Provide patient education as appropriate  Outcome: Progressing  Goal: Maintains/Returns to pre admission functional level  Description: INTERVENTIONS:  - Perform BMAT or MOVE assessment daily    - Set and communicate daily mobility goal to care team and patient/family/caregiver  - Collaborate with rehabilitation services on mobility goals if consulted  - Perform Range of Motion 3 times a day  - Reposition patient every 2 hours    - Dangle patient 3 times a day  - Stand patient 3 times a day  - Ambulate patient 3 times a day  - Out of bed to chair 3 times a day   - Out of bed for meals 3 times a day  - Out of bed for toileting  - Record patient progress and toleration of activity level   Outcome: Progressing     Problem: Prexisting or High Potential for Compromised Skin Integrity  Goal: Skin integrity is maintained or improved  Description: INTERVENTIONS:  - Identify patients at risk for skin breakdown  - Assess and monitor skin integrity  - Assess and monitor nutrition and hydration status  - Monitor labs   - Assess for incontinence   - Turn and reposition patient  - Assist with mobility/ambulation  - Relieve pressure over bony prominences  - Avoid friction and shearing  - Provide appropriate hygiene as needed including keeping skin clean and dry  - Evaluate need for skin moisturizer/barrier cream  - Collaborate with interdisciplinary team   - Patient/family teaching  - Consider wound care consult   Outcome: Progressing

## 2022-12-06 NOTE — H&P
[address] [facility] 1940, 80 y o  male MRN: [id number]  Unit/Bed#: [address] [facility] Encounter: [id number]  Primary Care Provider: Joseph Liriano DO   Date and time admitted to hospital: 12/5/2022  6:39 PM    * Leukocytosis  Assessment & Plan  Patient presents with somnolence for 24 hours and generalized weakness  Patient reports a little cough and runny nose, denies sore throat/SOB/fever/chills  · Patient hospitalized in November 2022 for symptomatic anemia, hemoglobin 7 1 on admission with positive stool guaiac, received 1 unit of PRBC, underwent EGD and colonoscopy both showed no evidence of bleeding, patient was recommended outpatient capsule endoscopy which is still pending in epic  Plavix was discontinued on discharge, patient remained on Xarelto  · WBC 15 38, repeat 14 80, no bands, patient afebrile  · Lactic acid normal   · Procalcitonin 1 79  · Chest x-ray appears unremarkable, pending final read  · Rhonchi on right lower lobe  · UA no evidence of infection   · COVID-19, flu RSV negative  · CT chest showed -  Numerous new pulmonary nodules, concerning for metastatic disease;Mediastinal and hilar lymphadenopathy, also concerning for metastases; Small right pleural effusion with adjacent basal opacity, most likely represents atelectasis  · Received Zosyn in ED for possible pneumonia, will deescalate to Rocephin  · Check urine antigens  · Follow-up blood cultures  · Repeat CBC with diff, procalcitonin in the morning  · PT OT eval treat    Acute encephalopathy  Assessment & Plan  Likely acute toxic metabolic encephalopathy due to underlying infection, rule out CVA  Patient reports sleepy and feeling weak, oriented to place and person day of week on exam   · CT head showed - Hypodensity in the right parieto-occipital region most likely represents a chronic infarct, however no prior study is available to confirm this    · No focal neuro deficit on exam  · ABG no hypercapnia  · Treat underlying causes  · Will follow stroke pathway  · Check 2D echo with bubble study  · MRI of the brain if able with without contrast in view of CT chest findings  (patient has pacemaker)  · Will order full-dose aspirin x1, patient is on Xarelto  · Will order Lipitor 80 mg x 1,then 40mg po daily  · Permissive hypertension  · PT OT ST  · Consult neurology    Metastatic disease [facility])  Assessment & Plan  CT chest as above  · Patient had CT abdomen pelvis without contrast on 11/19/2022 which was unremarkable  · Unable to obtain CT abdomen pelvis with contrast to assess for primary malignancy tonight due to CKD and CHF(discussed with Radiology on-call)  · Follow-up in the morning      Anemia  Assessment & Plan  Baseline hemoglobin appears to be around 7-9s  · Hemoglobin 9 1, repeat 8 1 in ED  No evidence of bleeding  · Repeat lab in the morning  · Continue iron supplement    Elevated troponin  Assessment & Plan  Mild troponin elevation most likely non MI troponin elevation secondary to underlying causes  · EKG showed paced rhythm  · Telemetry    Depression with anxiety  Assessment & Plan  Continue Cymbalta    Moderate to severe aortic stenosis  Assessment & Plan  Severe AS on echo in 1/2022    Gastro-esophageal reflux disease without esophagitis  Assessment & Plan  Continue PPI    Chronic diastolic heart failure (HCC)  Assessment & Plan  Wt Readings from Last 3 Encounters:   12/05/22 120 kg (265 lb)   11/22/22 121 kg (265 lb 10 5 oz)   10/20/22 120 kg (265 lb)     On Demadex 40 mg p o  Daily at home  · 2D echo in January 2022 showed normal EF, 65%, moderate concentric hypertrophy, dilated atriums, severe AS, mild to moderate AR, moderate MS  · Chest x-ray appears unremarkable to me, pending final read  · CT chest pending  · 1-2+ lower leg edema on exam, albumin 2 6    · Check proBNP  · Hold Demadex for 1 day in view of leukocytosis  · Daily weight, I&Os        COPD (chronic obstructive pulmonary disease) ([facility] )  Assessment & Plan  On Trelegy, albuterol nebulizer p r n  At home  · Rhonchi right lower lobe on auscultation  On oxygen 2L, satting high 90s  · Substitute Trelegy with [facility] Incruse  · Will order albuterol p r n  Obesity (BMI 30-39  9)  Assessment & Plan  Body mass index is 34 02 kg/m²  · Diet and lifestyle modification    CAD (coronary artery disease)  Assessment & Plan  Status post PCI with stents x 2  On Imdur, statin at home  · Continue Imdur, statin      Stage 3a chronic kidney disease [facility])  Assessment & Plan  Lab Results   Component Value Date    EGFR 38 12/05/2022    EGFR 38 11/22/2022    EGFR 43 11/21/2022    CREATININE 1 65 (H) 12/05/2022    CREATININE 1 63 (H) 11/22/2022    CREATININE 1 49 (H) 11/21/2022     Baseline creatinine appears to be around 1 3-1 6s  · Creatinine stable  · Monitor    JOVI (obstructive sleep apnea)  Assessment & Plan  Continue CPAP at HS    Hyperlipidemia  Assessment & Plan  Continue statin    Chronic a-fib [facility])  Assessment & Plan  Status post pacemaker insertion  On Xarelto at home  · Continue Xarelto  · Optimize electrolytes    Benign prostatic hyperplasia  Assessment & Plan  Continue Proscar  · Check PVR    Benign hypertension with chronic kidney disease, stage III ([place] [address] )  Assessment & Plan  On Demadex at home  · Hold for 1 day in view of leukocytosis  · BP well controlled    VTE Prophylaxis: Rivaroxaban (Xarelto)  / reason for no mechanical VTE prophylaxis On Xarelto   Code Status:  Full code  POLST: POLST form is not discussed and not completed at this time  Anticipated Length of Stay:  Patient will be admitted on an Observation basis with an anticipated length of stay of  < 2 midnights  Justification for Hospital Stay:  Leukocytosis, acute encephalopathy    Total Time for Visit, including Counseling / Coordination of Care: 45 minutes    Greater than 50% of this total time spent on direct patient counseling and coordination of care     Chief Complaint:   Somnolence for 24 hours, generalized weakness    History of Present Illness:    Glory Crigler is a 80 y o  male with PMH of anemia, CKD, hypertension, AFib, pacemaker insertion, COPD, CHF, CAD, BPH, GERD, hyperlipidemia, aortic stenosis, sleep apnea, obesity who presents with somnolence for 24 hours and generalized weakness  Patient reports a little cough and runny nose, denies sore throat/SOB/fever/chills  Denies chest pain, headache, dizziness, nausea vomiting  Denies any pain  Denies trouble speaking, trouble swallow, vision changes  Reports sleepy and feeling weak  Reports having trouble urinating  Review of Systems:    Review of Systems   Constitutional: Positive for activity change and fatigue  HENT: Positive for rhinorrhea  Respiratory: Positive for shortness of breath  Genitourinary: Positive for difficulty urinating  Psychiatric/Behavioral:        Sleepy   All other systems reviewed and are negative        Past Medical and Surgical History:     Past Medical History:   Diagnosis Date   • Arthritis    • Atrial flutter ([facility] )    • Chronic kidney disease     stage 3   • Coronary artery disease     2 stents   • Fluid retention    • Gout    • Heart disease    • Heart failure ([facility] )     pacemaker   • Hypertension    • Hyponatremia 04/08/2019   • Neurological disorder    • Pacemaker    • Pulmonary emphysema ([facility] )    • Radiculopathy     last assessed 1/28/16    • Shortness of breath     exertion   • Sleep apnea     c pap       Past Surgical History:   Procedure Laterality Date   • ANGIOPLASTY      x2 2 stents and then replaced   • CARDIAC PACEMAKER PLACEMENT      pacemaker permanent placement dual chamber / last assessed 4/7/14 / implantation    • CARDIAC SURGERY      pacemaker   • CHOLECYSTECTOMY     • CORONARY ANGIOPLASTY WITH STENT PLACEMENT     • EPIDURAL BLOCK INJECTION N/A 05/26/2016    Procedure: BLOCK / INJECTION EPIDURAL STEROID LUMBAR  L4-5;  Surgeon: Claudean Redman, MD;  Location: [facility] MAIN OR;  Service:    • EPIDURAL BLOCK INJECTION N/A 02/14/2019    Procedure: L4 L5 Lumbar Epidural Steroid Injection;  Surgeon: Claudean Redman, MD;  Location: [facility] MAIN OR;  Service: Pain Management    • EYE SURGERY      cataract left   • HAND SURGERY     • HIP PINNING Left    • INSERT / REPLACE / REMOVE PACEMAKER     • KNEE ARTHROSCOPY W/ MENISCAL REPAIR Left    • KNEE SURGERY     • LUMBAR EPIDURAL INJECTION N/A 03/17/2016    Procedure: BLOCK / INJECTION LUMBAR  L4-5  (C-ARM); Surgeon: Claudean Redman, MD;  Location: [facility] MAIN OR;  Service:    • UT OPEN RX FEMUR FX+INTRAMED ELLA Right 01/31/2022    Procedure: INSERTION NAIL IM FEMUR ANTEGRADE (TROCHANTERIC); Surgeon: Roseanne Cameron MD;  Location: [facility] Main OR;  Service: Orthopedics       Meds/Allergies:    Prior to Admission medications    Medication Sig Start Date End Date Taking? Authorizing Provider   Albuterol Sulfate (albuterol, FOR EMS ONLY,) (2 5 mg/3 mL) 0 083 % nebulizer solution Take 2 5 mg by nebulization every 6 (six) hours as needed for wheezing   Yes Historical Provider, MD   allopurinol (ZYLOPRIM) 100 mg tablet Take 2 tablets (200 mg total) by mouth daily 1/15/22  Yes Maame Dowd MD   Cranberry 1000 MG CAPS Take 1 tablet by mouth 2 (two) times a day  Yes Historical Provider, MD   Dapagliflozin Propanediol 5 MG TABS Take by mouth daily   Yes Historical Provider, MD   DULoxetine (CYMBALTA) 60 mg delayed release capsule TAKE 1 CAPSULE TWICE DAILY 9/26/22  Yes Carson Wakefield DPM   finasteride (PROSCAR) 5 mg tablet TAKE 1 TABLET DAILY 9/24/22  Yes Killian Spears DO   fluticasone-umeclidinium-vilanterol (Trelegy Ellipta) 200-62 5-25 MCG/INH AEPB inhaler Inhale 1 puff daily Rinse mouth after use   12/17/21  Yes Carlos Rivera PA-C   gabapentin (NEURONTIN) 100 mg capsule Take 1 capsule (100 mg total) by mouth daily at bedtime 10/14/22 12/13/22 Yes Carson Wakefield DPM   isosorbide mononitrate (IMDUR) 30 mg 24 hr tablet  9/23/22  Yes Historical Provider, MD   mupirocin (BACTROBAN) 2 % ointment Apply topically 3 (three) times a day 8/8/22  Yes Radha Foster PA-C   nystatin (MYCOSTATIN) powder Apply topically 2 (two) times a day 11/22/22  Yes Julian Latif MD   pantoprazole (PROTONIX) 40 mg tablet Take 1 tablet (40 mg total) by mouth daily in the early morning Do not start before November 23, 2022 11/23/22  Yes Julian Latif MD   polyethylene glycol (MIRALAX) 17 g packet Take 17 g by mouth daily as needed   Yes Historical Provider, MD   rivaroxaban (XARELTO) 15 mg tablet Take 1 tablet (15 mg total) by mouth daily with breakfast 4/13/19  Yes Mitra Davidson,    simvastatin (ZOCOR) 20 mg tablet TAKE 1 TABLET DAILY AT     BEDTIME 6/21/22  Yes Killian Spears,    torsemide 40 MG TABS Take 40 mg by mouth daily Do not start before November 23, 2022 11/23/22  Yes Julian Latif MD     I have reviewed home medications using allscripts      Allergies: No Known Allergies    Social History:     Marital Status: /Civil Union   Occupation:  Retired  Patient Pre-hospital Living Situation:  Wife  Patient Pre-hospital Level of Mobility:  Walker  Patient Pre-hospital Diet Restrictions:  Cardiac diet  Substance Use History:   Social History     Substance and Sexual Activity   Alcohol Use Never     Social History     Tobacco Use   Smoking Status Every Day   • Packs/day: 0 25   • Years: 50 00   • Pack years: 12 50   • Types: Cigarettes   • Start date: 1/1/2022   Smokeless Tobacco Former   Tobacco Comments    quit 02/14/2021     Social History     Substance and Sexual Activity   Drug Use No       Family History:    non-contributory    Physical Exam:     Vitals:   Blood Pressure: 116/57 (12/06/22 0230)  Pulse: 70 (12/06/22 0351)  Temperature: 98 4 °F (36 9 °C) (12/05/22 1844)  Temp Source: Oral (12/05/22 1844)  Respirations: (!) 24 (12/06/22 0230)  Height: 6' 2" (188 cm) (12/05/22 1844)  Weight - Scale: 120 kg (265 lb) (12/05/22 1844)  SpO2: 93 % (12/06/22 0351)    Physical Exam  Vitals and nursing note reviewed  Constitutional:       Appearance: He is well-developed and well-nourished  Comments: Patient appears weak   HENT:      Head: Normocephalic and atraumatic  Neck:      Thyroid: No thyromegaly  Vascular: No JVD  Trachea: No tracheal deviation  Cardiovascular:      Rate and Rhythm: Normal rate and regular rhythm  Pulses: Intact distal pulses  Heart sounds: Murmur heard  Comments: Status post pacemaker insertion  Pulmonary:      Effort: Pulmonary effort is normal  No respiratory distress  Breath sounds: Rhonchi present  No wheezing or rales  Comments: Right lower lobe rhonchi  Abdominal:      General: Bowel sounds are normal  There is no distension  Palpations: Abdomen is soft  Tenderness: There is no abdominal tenderness  There is no guarding  Musculoskeletal:         General: Deformity present  Cervical back: Neck supple  Comments: 1-2+ lower leg edema, discoloration to lower extremities, right lower leg petechia seen  Skin:     General: Skin is warm and dry  Neurological:      General: No focal deficit present  Mental Status: He is alert  Comments: Patient sleepy, easily arousable, oriented to place and person, day of the week, follows commands   Psychiatric:         Mood and Affect: Mood and affect normal          Additional Data:     Lab Results: I have personally reviewed pertinent reports        Results from last 7 days   Lab Units 12/06/22  0104   WBC Thousand/uL 14 80*   HEMOGLOBIN g/dL 8 1*   HEMATOCRIT % 27 2*   PLATELETS Thousands/uL 158   LYMPHO PCT % 3*   MONO PCT % 8   EOS PCT % 0     Results from last 7 days   Lab Units 12/05/22  1919   POTASSIUM mmol/L 3 7   CHLORIDE mmol/L 96   CO2 mmol/L 29   BUN mg/dL 32*   CREATININE mg/dL 1 65*   CALCIUM mg/dL 8 6   ALK PHOS U/L 85   ALT U/L 23   AST U/L 33     Results from last 7 days Lab Units 12/05/22 1948   INR  1 74*       Imaging: I have personally reviewed pertinent reports  EGD    Addendum Date: 11/29/2022 Addendum:   [facility] Operating Room [address] [place] [address] [phone] DATE OF SERVICE: 11/18/22 PHYSICIAN(S): Attending: Jonelle Suh MD Fellow: No Staff Documented Procedure :  EGD with biopsies INDICATION: Iron deficiency anemia, unspecified iron deficiency anemia type POST-OP DIAGNOSIS: See the impression below  PREPROCEDURE: Informed consent was obtained for the procedure, including sedation  Risks of perforation, hemorrhage, adverse drug reaction and aspiration were discussed  The patient was placed in the left lateral decubitus position  Patient was explained about the risks and benefits of the procedure  Risks including but not limited to bleeding, infection, and perforation were explained in detail  Also explained about less than 100% sensitivity with the exam and other alternatives  DETAILS OF PROCEDURE: Patient was taken to the procedure room where a time out was performed to confirm correct patient and correct procedure  The patient underwent monitored anesthesia care, which was administered by an anesthesia professional  The patient's blood pressure, heart rate, level of consciousness, respirations and oxygen were monitored throughout the procedure  The scope was advanced to the second part of the duodenum  Retroflexion was performed in the fundus  Prior to the procedure, the patient's H  Pylori status was unknown  The patient experienced no blood loss  The procedure was not difficult  The patient tolerated the procedure well  There were no apparent complications   ANESTHESIA INFORMATION: ASA: III Anesthesia Type: IV Sedation with Anesthesia MEDICATIONS: No administrations occurring from 1438 to 1448 on 11/18/22 FINDINGS: Multiple whitish plaque in the upper and middle esophagus suggests evidence of Candida esophagitis, biopsies were done Mild, localized erythematous mucosa in the antrum, consistent with gastritis; performed cold forceps biopsy to rule out H  pylori Single medium diverticulum in the fundus of the stomach; no bleeding was identified The duodenal bulb, 1st part of the duodenum and 2nd part of the duodenum appeared normal  SPECIMENS: ID Type Source Tests Collected by Time Destination 1 : antral bxs Tissue Stomach TISSUE EXAM Norm Crane MD 11/18/2022  2:45 PM  2 : esophageal bxs- r/o candida Tissue Esophagus TISSUE EXAM Norm Crane MD 11/18/2022  2:46 PM  IMPRESSION: 1  Candida esophagitis 2  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 3  Mild gastritis 4  Normal duodenum 5  No sign of upper GI bleeding RECOMMENDATION:  Await pathology results     Diflucan 100 mg p o  Q day     Pantoprazole 40 mg p o  Q day     Monitor CBC     Repeat EGD in 3years    Norm Crane MD     Result Date: 11/29/2022  Narrative: Table formatting from the original result was not included  [address] [place] [address] Operating Room [address] [place], [place] [address] [phone] DATE OF SERVICE: 11/18/22 PHYSICIAN(S): Attending: Norm Crane MD Fellow: No Staff Documented Procedure :  EGD with biopsies INDICATION: Iron deficiency anemia, unspecified iron deficiency anemia type POST-OP DIAGNOSIS: See the impression below  PREPROCEDURE: Informed consent was obtained for the procedure, including sedation  Risks of perforation, hemorrhage, adverse drug reaction and aspiration were discussed  The patient was placed in the left lateral decubitus position  Patient was explained about the risks and benefits of the procedure  Risks including but not limited to bleeding, infection, and perforation were explained in detail  Also explained about less than 100% sensitivity with the exam and other alternatives   DETAILS OF PROCEDURE: Patient was taken to the procedure room where a time out was performed to confirm correct patient and correct procedure  The patient underwent monitored anesthesia care, which was administered by an anesthesia professional  The patient's blood pressure, heart rate, level of consciousness, respirations and oxygen were monitored throughout the procedure  The scope was advanced to the second part of the duodenum  Retroflexion was performed in the fundus  Prior to the procedure, the patient's H  Pylori status was unknown  The patient experienced no blood loss  The procedure was not difficult  The patient tolerated the procedure well  There were no apparent complications  ANESTHESIA INFORMATION: ASA: III Anesthesia Type: IV Sedation with Anesthesia MEDICATIONS: No administrations occurring from 1438 to 1448 on 11/18/22 FINDINGS: Multiple whitish plaque in the upper and middle esophagus suggests evidence of Candida esophagitis, biopsies were done Mild, localized erythematous mucosa in the antrum, consistent with gastritis; performed cold forceps biopsy to rule out H  pylori Single medium diverticulum in the fundus of the stomach; no bleeding was identified The duodenal bulb, 1st part of the duodenum and 2nd part of the duodenum appeared normal  SPECIMENS: ID Type Source Tests Collected by Time Destination 1 : antral bxs Tissue Stomach TISSUE EXAM Bridger Dobbins MD 11/18/2022  2:45 PM  2 : esophageal bxs- r/o candida Tissue Esophagus TISSUE EXAM Bridger Dobbins MD 11/18/2022  2:46 PM      Impression: 1  Candida esophagitis 2  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 3  Mild gastritis 4  Normal duodenum 5  No sign of upper GI bleeding RECOMMENDATION:  Await pathology results     Diflucan 100 mg p o  Q day     Pantoprazole 40 mg p o  Q day     Monitor CBC   Bridger Dobbins MD     Colonoscopy    Result Date: 11/21/2022  Narrative: Table formatting from the original result was not included   [facility] [address] Operating Room [place] [address] [phone] DATE OF SERVICE: 11/21/22 PHYSICIAN(S): Attending: Maddie Vaughn MD Fellow: No Staff Documented INDICATION: Iron deficiency anemia, unspecified iron deficiency anemia type POST-OP DIAGNOSIS: See the impression below  HISTORY: Prior colonoscopy: More than 10 years ago  BOWEL PREPARATION: Golytely/Colyte/Trilyte PREPROCEDURE: Informed consent was obtained for the procedure, including sedation  Risks including but not limited to bleeding, infection, perforation, adverse drug reaction and aspiration were explained in detail  Also explained about less than 100% sensitivity with the exam and other alternatives  The patient was placed in the left lateral decubitus position  DETAILS OF PROCEDURE: Patient was taken to the procedure room where a time out was performed to confirm correct patient and correct procedure  The patient underwent monitored anesthesia care, which was administered by an anesthesia professional  The patient's blood pressure, heart rate, level of consciousness, oxygen and respirations were monitored throughout the procedure  A digital rectal exam was performed  The scope was introduced through the anus and advanced to the cecum  Retroflexion was performed in the rectum  The quality of bowel preparation was evaluated using the [facility] Bowel Preparation Scale with scores of: right colon = 1, transverse colon = 1, left colon = 1  The total BBPS score was 3  Bowel prep was not adequate  The patient experienced no blood loss  The procedure was not difficult  The patient tolerated the procedure well  There were no apparent complications   ANESTHESIA INFORMATION: ASA: III Anesthesia Type: IV Sedation with Anesthesia MEDICATIONS: No administrations occurring from 1533 to 1707 on 11/21/22 FINDINGS: All observed locations appeared normal  EVENTS: Procedure Events Event Event Time ENDO CECUM REACHED 11/21/2022  3:51 PM ENDO SCOPE OUT TIME 11/21/2022  3:58 PM SPECIMENS: * No specimens in log * EQUIPMENT: Colonoscope - Impression: No blood or bleeding source identified  RECOMMENDATION:  No further screening colonoscopies necessary  Age greater than 72   Consider outpatient capsule endoscopy May restart Ruthie Martinez MD     CT abdomen pelvis wo contrast    Result Date: 11/20/2022  Narrative: CT ABDOMEN AND PELVIS WITHOUT IV CONTRAST INDICATION:   Anemia Retroperitoneal hematoma suspected anemia, recent fall r/o hematoma  COMPARISON: CT renal stone 4/8/2021, CT abdomen and pelvis 12/8/2012 TECHNIQUE:  CT examination of the abdomen and pelvis was performed without intravenous contrast  Axial, sagittal, and coronal 2D reformatted images were created from the source data and submitted for interpretation  Radiation dose length product (DLP) for this visit:  [id number] 83 mGy-cm   This examination, like all CT scans performed in the [facility], was performed utilizing techniques to minimize radiation dose exposure, including the use of iterative reconstruction and automated exposure control  Enteric contrast was not administered  FINDINGS: ABDOMEN LOWER CHEST:  Small right and trace left pleural effusions with bibasilar atelectasis are present  LIVER/BILIARY TREE:  Unremarkable  GALLBLADDER:  Gallbladder is surgically absent  SPLEEN:  Unremarkable  PANCREAS:  Unremarkable  ADRENAL GLANDS:  Unremarkable  KIDNEYS/URETERS:  Nonobstructing left lower pole calculus is stable  No hydronephrosis  Simple cyst is present within the left upper pole  STOMACH AND BOWEL:  There is colonic diverticulosis without evidence of acute diverticulitis  APPENDIX:  No findings to suggest appendicitis  ABDOMINOPELVIC CAVITY:  No ascites  No pneumoperitoneum  No lymphadenopathy  VESSELS:  Unremarkable for patient's age  PELVIS REPRODUCTIVE ORGANS:  Unremarkable for patient's age  URINARY BLADDER:  Unremarkable  ABDOMINAL WALL/INGUINAL REGIONS:  There is a small fat-containing umbilical hernia, unchanged from previous examination  OSSEOUS STRUCTURES:  Healing right intertrochanteric fracture status post internal fixation is noted  Alignment is near-anatomic  Impression: No acute abnormality within the abdomen or pelvis  Specifically, no retroperitoneal hematoma identified  Workstation performed: [id number]     XR chest 1 view portable    Result Date: 11/17/2022  Narrative: CHEST INDICATION:   SOB  COMPARISON:  None EXAM PERFORMED/VIEWS:  XR CHEST PORTABLE FINDINGS:  Left-sided chest wall intracardiac device is identified  Leads are intact  Moderate cardiomegaly  Prominent vascular markings  No pneumothorax or pleural effusion  Osseous structures appear within normal limits for patient age  Impression: Cardiomegaly with moderate congestive changes  Workstation performed: [id number]     CT head without contrast    Result Date: 12/6/2022  Narrative: CT BRAIN - WITHOUT CONTRAST INDICATION:   Delirium AMS  COMPARISON:  None  TECHNIQUE:  CT examination of the brain was performed  In addition to axial images, sagittal and coronal 2D reformatted images were created and submitted for interpretation  Radiation dose length product (DLP) for this visit:  962 13 mGy-cm   This examination, like all CT scans performed in the [facility], was performed utilizing techniques to minimize radiation dose exposure, including the use of iterative  reconstruction and automated exposure control  IMAGE QUALITY:  Diagnostic  FINDINGS: PARENCHYMA: Decreased attenuation is noted in periventricular and subcortical white matter demonstrating an appearance that is statistically most likely to represent mild microangiopathic change  Parenchymal hypodensity within the right parieto-occipital region primarily affecting the white matter No CT signs of acute infarction  No intracranial mass, mass effect or midline shift  No acute parenchymal hemorrhage  VENTRICLES AND EXTRA-AXIAL SPACES:  Normal for the patient's age   VISUALIZED ORBITS AND PARANASAL SINUSES:  Normal visualized orbits  Normal visualized paranasal sinuses  CALVARIUM AND EXTRACRANIAL SOFT TISSUES:  Normal      Impression: Hypodensity in the right parieto-occipital region 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

## 2022-12-06 NOTE — ASSESSMENT & PLAN NOTE
On Trelegy, albuterol nebulizer p r n   At home  · Was initially on 2 L oxygen but currently stable on room air  · Substitute Trelegy with Breo and Incruse  · Continue albuterol as needed

## 2022-12-06 NOTE — ASSESSMENT & PLAN NOTE
Lab Results   Component Value Date    EGFR 40 12/06/2022    EGFR 38 12/05/2022    EGFR 38 11/22/2022    CREATININE 1 58 (H) 12/06/2022    CREATININE 1 65 (H) 12/05/2022    CREATININE 1 63 (H) 11/22/2022     Baseline creatinine appears to be around 1 3-1 6s    · Creatinine stable  · Monitor

## 2022-12-06 NOTE — ASSESSMENT & PLAN NOTE
Mild troponin elevation most likely non MI troponin elevation secondary to sepsis  · EKG showed paced rhythm

## 2022-12-06 NOTE — ED PROVIDER NOTES
History  Chief Complaint   Patient presents with   • Weakness - Generalized     Patient arrives via EMS for weakness at home, no sob, no nausea or vomiting, just weakness, temp upon arrival is 98 4 orally, O2 sat=97% on RA     Patient's wife states he has been extraordinarily sleepy  Patient has not gone out of bed for 24 hours  He slept through much of the day and did not take his medication  Patient states he just feels sleepy and tired  Denies any pain  Wife explains he has been having increased frequency of urination with some incontinence  No fever  Patient was recently in the hospital with multiple problems including renal insufficiency, elevated troponin, GI bleed with anemia  Prior to Admission Medications   Prescriptions Last Dose Informant Patient Reported? Taking? Albuterol Sulfate (albuterol, FOR EMS ONLY,) (2 5 mg/3 mL) 0 083 % nebulizer solution   Yes Yes   Sig: Take 2 5 mg by nebulization every 6 (six) hours as needed for wheezing   Cranberry 1000 MG CAPS   Yes Yes   Sig: Take 1 tablet by mouth 2 (two) times a day     DULoxetine (CYMBALTA) 60 mg delayed release capsule   No Yes   Sig: TAKE 1 CAPSULE TWICE DAILY   Dapagliflozin Propanediol 5 MG TABS   Yes Yes   Sig: Take by mouth daily   allopurinol (ZYLOPRIM) 100 mg tablet   No Yes   Sig: Take 2 tablets (200 mg total) by mouth daily   finasteride (PROSCAR) 5 mg tablet   No Yes   Sig: TAKE 1 TABLET DAILY   fluticasone-umeclidinium-vilanterol (Trelegy Ellipta) 200-62 5-25 MCG/INH AEPB inhaler   No Yes   Sig: Inhale 1 puff daily Rinse mouth after use    gabapentin (NEURONTIN) 100 mg capsule   No Yes   Sig: Take 1 capsule (100 mg total) by mouth daily at bedtime   isosorbide mononitrate (IMDUR) 30 mg 24 hr tablet   Yes Yes   mupirocin (BACTROBAN) 2 % ointment   No Yes   Sig: Apply topically 3 (three) times a day   nystatin (MYCOSTATIN) powder   No Yes   Sig: Apply topically 2 (two) times a day   pantoprazole (PROTONIX) 40 mg tablet   No Yes   Sig: Take 1 tablet (40 mg total) by mouth daily in the early morning Do not start before November 23, 2022  polyethylene glycol (MIRALAX) 17 g packet   Yes Yes   Sig: Take 17 g by mouth daily as needed   rivaroxaban (XARELTO) 15 mg tablet   No Yes   Sig: Take 1 tablet (15 mg total) by mouth daily with breakfast   simvastatin (ZOCOR) 20 mg tablet   No Yes   Sig: TAKE 1 TABLET DAILY AT     BEDTIME   torsemide 40 MG TABS   No Yes   Sig: Take 40 mg by mouth daily Do not start before November 23, 2022        Facility-Administered Medications: None       Past Medical History:   Diagnosis Date   • Arthritis    • Atrial flutter (HCC)    • Chronic kidney disease     stage 3   • Coronary artery disease     2 stents   • Fluid retention    • Gout    • Heart disease    • Heart failure (HCC)     pacemaker   • Hypertension    • Hyponatremia 04/08/2019   • Neurological disorder    • Pacemaker    • Pulmonary emphysema ([facility] [address] )    • Radiculopathy     last assessed 1/28/16    • Shortness of breath     exertion   • Sleep apnea     c pap       Past Surgical History:   Procedure Laterality Date   • ANGIOPLASTY      x2 2 stents and then replaced   • CARDIAC PACEMAKER PLACEMENT      pacemaker permanent placement dual chamber / last assessed 4/7/14 / implantation    • CARDIAC SURGERY      pacemaker   • CHOLECYSTECTOMY     • CORONARY ANGIOPLASTY WITH STENT PLACEMENT     • EPIDURAL BLOCK INJECTION N/A 05/26/2016    Procedure: BLOCK / INJECTION EPIDURAL STEROID LUMBAR  L4-5;  Surgeon: Poonam Schmidt MD;  Location: [facility] MAIN OR;  Service:    • EPIDURAL BLOCK INJECTION N/A 02/14/2019    Procedure: L4 L5 Lumbar Epidural Steroid Injection;  Surgeon: Poonam Schmidt MD;  Location: [facility] MAIN OR;  Service: Pain Management    • EYE SURGERY      cataract left   • HAND SURGERY     • HIP PINNING Left    • INSERT / REPLACE / REMOVE PACEMAKER     • KNEE ARTHROSCOPY W/ MENISCAL REPAIR Left    • KNEE SURGERY     • LUMBAR EPIDURAL INJECTION N/A 2016    Procedure: BLOCK / INJECTION LUMBAR  L4-5  (C-ARM); Surgeon: Zenon Grace MD;  Location: [facility] MAIN OR;  Service:    • AK OPEN RX FEMUR FX+INTRAMED ELLA Right 2022    Procedure: INSERTION NAIL IM FEMUR ANTEGRADE (TROCHANTERIC); Surgeon: Nataly Almeida MD;  Location: [facility] Main OR;  Service: Orthopedics       Family History   Problem Relation Age of Onset   • Cancer Mother 80        Cancer when 80 yrs old   • Heart disease Mother    • Hypertension Mother    • Heart disease Father    • Cancer Father         Heart   46   • Diabetes Neg Hx    • Stroke Neg Hx      I have reviewed and agree with the history as documented  E-Cigarette/Vaping   • E-Cigarette Use Never User      E-Cigarette/Vaping Substances   • Nicotine No    • THC No    • CBD No    • Flavoring No    • Other No    • Unknown No      Social History     Tobacco Use   • Smoking status: Every Day     Packs/day: 0 25     Years: 50 00     Pack years: 12 50     Types: Cigarettes     Start date: 2022   • Smokeless tobacco: Former   • Tobacco comments:     quit 2021   Vaping Use   • Vaping Use: Never used   Substance Use Topics   • Alcohol use: Never   • Drug use: No       Review of Systems   Constitutional: Positive for fatigue  Negative for chills and fever  HENT: Negative for congestion and sore throat  Eyes: Negative for visual disturbance  Respiratory: Negative for cough and shortness of breath  Cardiovascular: Positive for leg swelling  Negative for chest pain  Gastrointestinal: Negative for abdominal pain and vomiting  Genitourinary: Positive for frequency  Negative for decreased urine volume and dysuria  Musculoskeletal: Negative for arthralgias and back pain  Skin: Negative for rash  Neurological: Positive for weakness  Negative for seizures, speech difficulty and headaches  Hematological: Does not bruise/bleed easily  Psychiatric/Behavioral: Positive for sleep disturbance   Negative for confusion  All other systems reviewed and are negative  Physical Exam  Physical Exam  Vitals and nursing note reviewed  Constitutional:       Appearance: Normal appearance  Comments: Patient seems somewhat somnolent however is arousable to complete alertness with voice   HENT:      Head: Normocephalic  Right Ear: External ear normal       Left Ear: External ear normal       Nose: Nose normal       Mouth/Throat:      Pharynx: Oropharynx is clear  Eyes:      Extraocular Movements: Extraocular movements intact  Conjunctiva/sclera: Conjunctivae normal    Cardiovascular:      Rate and Rhythm: Normal rate  Pulses: Normal pulses  Heart sounds: Normal heart sounds  Pulmonary:      Effort: Pulmonary effort is normal       Breath sounds: Normal breath sounds  Abdominal:      Palpations: Abdomen is soft  Tenderness: There is no abdominal tenderness  Musculoskeletal:         General: Normal range of motion  Cervical back: Normal range of motion and neck supple  Right lower leg: Edema present  Left lower leg: Edema present  Skin:     General: Skin is warm and dry  Capillary Refill: Capillary refill takes less than 2 seconds  Comments: Trophic skin changes both lower extremities   Neurological:      General: No focal deficit present  Mental Status: He is oriented to person, place, and time  Psychiatric:         Behavior: Behavior normal       Comments: Patient is somewhat somnolent but can answer questions appropriately    He sometimes drifts off to sleep         Vital Signs  ED Triage Vitals [12/05/22 1844]   Temperature Pulse Respirations Blood Pressure SpO2   98 4 °F (36 9 °C) 81 20 138/65 97 %      Temp Source Heart Rate Source Patient Position - Orthostatic VS BP Location FiO2 (%)   Oral Monitor Lying Right arm --      Pain Score       --           Vitals:    12/05/22 2200 12/05/22 2300 12/05/22 2330 12/06/22 0000   BP: 131/73 122/58 127/58 130/58   Pulse: 84 80 80 80   Patient Position - Orthostatic VS:  Lying Lying Lying         Visual Acuity      ED Medications  Medications - No data to display    Diagnostic Studies  Results Reviewed     Procedure Component Value Units Date/Time    Blood culture #1 [[id number]] Collected: 12/05/22 1948    Lab Status: Preliminary result Specimen: Blood from Arm, Left Updated: 12/06/22 0101     Blood Culture Received in Microbiology Lab  Culture in Progress  Blood culture #2 [[id number]] Collected: 12/05/22 1948    Lab Status: Preliminary result Specimen: Blood from Arm, Right Updated: 12/06/22 0101     Blood Culture Received in Microbiology Lab  Culture in Progress      Urine Microscopic [[id number]]  (Abnormal) Collected: 12/06/22 0040    Lab Status: Final result Specimen: Urine, Clean Catch Updated: 12/06/22 0059     RBC, UA 2-4 /hpf      WBC, UA 1-2 /hpf      Epithelial Cells Occasional /hpf      Bacteria, UA Moderate /hpf      Fine granular casts 0-3 /lpf      AMORPH URATES Occasional /hpf     CBC and differential [[id number]]     Lab Status: No result Specimen: Blood     Procalcitonin [[id number]]     Lab Status: No result Specimen: Blood     Blood gas, arterial [[id number]]     Lab Status: No result Specimen: Blood, Arterial     UA w Reflex to Microscopic w Reflex to Culture [[id number]]  (Abnormal) Collected: 12/06/22 0040    Lab Status: Final result Specimen: Urine, Clean Catch Updated: 12/06/22 0045     Color, UA Yellow     Clarity, UA Clear     Specific Gravity, UA 1 010     pH, UA 6 0     Leukocytes, UA Negative     Nitrite, UA Negative     Protein, UA Trace mg/dl      Glucose, UA Negative mg/dl      Ketones, UA Negative mg/dl      Urobilinogen, UA 0 2 E U /dl      Bilirubin, UA Negative     Occult Blood, UA Trace-Intact    HS Troponin I 4hr [[id number]]  (Abnormal) Collected: 12/05/22 2114    Lab Status: Final result Specimen: Blood from Arm, Right Updated: 12/05/22 2299     hs TnI 4hr 354 ng/L      Delta 4hr hsTnI 29 ng/L     HS Troponin I 2hr [[id number]]  (Abnormal) Collected: 12/05/22 2109    Lab Status: Final result Specimen: Blood from Arm, Right Updated: 12/05/22 2141     hs TnI 2hr 298 ng/L      Delta 2hr hsTnI -27 ng/L     FLU/RSV/COVID - if FLU/RSV clinically relevant [[id number]]  (Normal) Collected: 12/05/22 1921    Lab Status: Final result Specimen: Nares from Nose Updated: 12/05/22 2049     SARS-CoV-2 Negative     INFLUENZA A PCR Negative     INFLUENZA B PCR Negative     RSV PCR Negative    Narrative:      FOR PEDIATRIC PATIENTS - copy/paste COVID Guidelines URL to browser: [email or web address]  [email or web address] [email or web address]    SARS-CoV-2 assay is a Nucleic Acid Amplification assay intended for the  qualitative detection of nucleic acid from SARS-CoV-2 in nasopharyngeal  swabs  Results are for the presumptive identification of SARS-CoV-2 RNA  Positive results are indicative of infection with SARS-CoV-2, the virus  causing COVID-19, but do not rule out bacterial infection or co-infection  with other viruses  Laboratories within the [place] and its  territories are required to report all positive results to the appropriate  public health authorities  Negative results do not preclude SARS-CoV-2  infection and should not be used as the sole basis for treatment or other  patient management decisions  Negative results must be combined with  clinical observations, patient history, and epidemiological information  This test has not been FDA cleared or approved  This test has been authorized by FDA under an Emergency Use Authorization  (EUA)  This test is only authorized for the duration of time the  declaration that circumstances exist justifying the authorization of the  emergency use of an in vitro diagnostic tests for detection of SARS-CoV-2  virus and/or diagnosis of COVID-19 infection under section 564(b)(1) of  the Act, 21 U  S C  118EZQ-8(M)(2), unless the authorization is terminated  or revoked sooner  The test has been validated but independent review by FDA  and CLIA is pending  Test performed using [employer] Gene[employer]pert: This RT-PCR assay targets N2,  a region unique to SARS-CoV-2  A conserved region in the E-gene was chosen  for pan-Sarbecovirus detection which includes SARS-CoV-2  According to CMS-2020-01-R, this platform meets the definition of high-throughput technology  CBC and differential [[id number]]  (Abnormal) Collected: 12/05/22 1919    Lab Status: Final result Specimen: Blood from Arm, Right Updated: 12/05/22 2035     WBC 15 38 Thousand/uL      RBC 3 41 Million/uL      Hemoglobin 9 1 g/dL      Hematocrit 30 7 %      MCV 90 fL      MCH 26 7 pg      MCHC 29 6 g/dL      RDW 21 8 %      MPV 9 5 fL      Platelets 686 Thousands/uL     Narrative: This is an appended report  These results have been appended to a previously verified report      Manual Differential(PHLEBS Do Not Order) [[id number]]  (Abnormal) Collected: 12/05/22 1919    Lab Status: Final result Specimen: Blood from Arm, Right Updated: 12/05/22 2035     Segmented % 95 %      Lymphocytes % 3 %      Monocytes % 1 %      Eosinophils, % 0 %      Basophils % 0 %      Myelocytes % 1 %      Absolute Neutrophils 14 61 Thousand/uL      Lymphocytes Absolute 0 46 Thousand/uL      Monocytes Absolute 0 15 Thousand/uL      Eosinophils Absolute 0 00 Thousand/uL      Basophils Absolute 0 00 Thousand/uL      Total Counted --     Macrocytes Present     Polychromasia Present     Platelet Estimate Adequate    Comprehensive metabolic panel [[id number]]  (Abnormal) Collected: 12/05/22 1919    Lab Status: Final result Specimen: Blood from Arm, Right Updated: 12/05/22 2032     Sodium 135 mmol/L      Potassium 3 7 mmol/L      Chloride 96 mmol/L      CO2 29 mmol/L      ANION GAP 10 mmol/L      BUN 32 mg/dL      Creatinine 1 65 mg/dL      Glucose 140 mg/dL      Calcium 8 6 mg/dL      Corrected Calcium 9 7 mg/dL      AST 33 U/L ALT 23 U/L      Alkaline Phosphatase 85 U/L      Total Protein 7 3 g/dL      Albumin 2 6 g/dL      Total Bilirubin 1 70 mg/dL      eGFR 38 ml/min/1 73sq m     Narrative:      [place] guidelines for Chronic Kidney Disease (CKD):   •  Stage 1 with normal or high GFR (GFR > 90 mL/min/1 73 square meters)  •  Stage 2 Mild CKD (GFR = 60-89 mL/min/1 73 square meters)  •  Stage 3A Moderate CKD (GFR = 45-59 mL/min/1 73 square meters)  •  Stage 3B Moderate CKD (GFR = 30-44 mL/min/1 73 square meters)  •  Stage 4 Severe CKD (GFR = 15-29 mL/min/1 73 square meters)  •  Stage 5 End Stage CKD (GFR <15 mL/min/1 73 square meters)  Note: GFR calculation is accurate only with a steady state creatinine    Lactic acid [[id number]]  (Normal) Collected: 12/05/22 1948    Lab Status: Final result Specimen: Blood from Arm, Right Updated: 12/05/22 2015     LACTIC ACID 1 8 mmol/L     Narrative:      Result may be elevated if tourniquet was used during collection      Protime-INR [[id number]]  (Abnormal) Collected: 12/05/22 1948    Lab Status: Final result Specimen: Blood from Arm, Left Updated: 12/05/22 2007     Protime 20 4 seconds      INR 1 74    APTT [[id number]]  (Normal) Collected: 12/05/22 1948    Lab Status: Final result Specimen: Blood from Arm, Left Updated: 12/05/22 2007     PTT 32 seconds     HS Troponin 0hr (reflex protocol) [[id number]]  (Abnormal) Collected: 12/05/22 1919    Lab Status: Final result Specimen: Blood from Arm, Right Updated: 12/05/22 1955     hs TnI 0hr 325 ng/L                  XR chest 1 view portable    (Results Pending)              Procedures  ECG 12 Lead Documentation Only    Date/Time: 12/5/2022 8:04 PM  Performed by: Leland Hogan MD  Authorized by: Leland Hogan MD     Indications / Diagnosis:  AMS  ECG reviewed by me, the ED Provider: yes    Patient location:  ED  Interpretation:     Interpretation: abnormal    Rate:     ECG rate:  79    ECG rate assessment: normal    Ectopy: Ectopy: none    QRS:     QRS axis:  Normal    QRS intervals:  Normal  Conduction:     Conduction: normal    ST segments:     ST segments:  Abnormal  T waves:     T waves: inverted      Inverted:  V6, V5, V4, II, III and aVF             ED Course                               SBIRT 20yo+    Flowsheet Row Most Recent Value   SBIRT (23 yo +)    In order to provide better care to our patients, we are screening all of our patients for alcohol and drug use  Would it be okay to ask you these screening questions? No Filed at: 12/05/2022 2300                    MDM  Number of Diagnoses or Management Options  Diagnosis management comments: Patient has SH[place] stable  Elevated troponin appears to be due to metabolic non cardiac effects  No EKG changes  Urosepsis is possibility, septic profile ordered      Disposition  Final diagnoses:   None     ED Disposition     None      Follow-up Information    None         Patient's Medications   Discharge Prescriptions    No medications on file       No discharge procedures on file      PDMP Review       Value Time User    PDMP Reviewed  Yes 2/19/2022 10:29 AM Delaney Coronel, Rios Logan          ED Provider  Electronically Signed by

## 2022-12-07 ENCOUNTER — TELEPHONE (OUTPATIENT)
Dept: NEPHROLOGY | Facility: CLINIC | Age: 82
End: 2022-12-07

## 2022-12-07 ENCOUNTER — ANESTHESIA (INPATIENT)
Dept: NON INVASIVE DIAGNOSTICS | Facility: HOSPITAL | Age: 82
End: 2022-12-07

## 2022-12-07 ENCOUNTER — APPOINTMENT (OUTPATIENT)
Dept: NON INVASIVE DIAGNOSTICS | Facility: HOSPITAL | Age: 82
End: 2022-12-07
Attending: INTERNAL MEDICINE

## 2022-12-07 ENCOUNTER — ANESTHESIA EVENT (INPATIENT)
Dept: NON INVASIVE DIAGNOSTICS | Facility: HOSPITAL | Age: 82
End: 2022-12-07

## 2022-12-07 PROBLEM — B95.61 MSSA BACTEREMIA: Status: ACTIVE | Noted: 2020-09-13

## 2022-12-07 PROBLEM — R93.89 ABNORMAL CAT SCAN: Status: ACTIVE | Noted: 2022-01-01

## 2022-12-07 LAB
ANION GAP SERPL CALCULATED.3IONS-SCNC: 7 MMOL/L (ref 4–13)
BASOPHILS # BLD AUTO: 0.02 THOUSANDS/ÂΜL (ref 0–0.1)
BASOPHILS NFR BLD AUTO: 0 % (ref 0–1)
BUN SERPL-MCNC: 27 MG/DL (ref 5–25)
CALCIUM SERPL-MCNC: 8.2 MG/DL (ref 8.3–10.1)
CARDIAC TROPONIN I PNL SERPL HS: 723 NG/L (ref 8–18)
CHLORIDE SERPL-SCNC: 95 MMOL/L (ref 96–108)
CO2 SERPL-SCNC: 28 MMOL/L (ref 21–32)
CREAT SERPL-MCNC: 1.38 MG/DL (ref 0.6–1.3)
EOSINOPHIL # BLD AUTO: 0.03 THOUSAND/ÂΜL (ref 0–0.61)
EOSINOPHIL NFR BLD AUTO: 0 % (ref 0–6)
ERYTHROCYTE [DISTWIDTH] IN BLOOD BY AUTOMATED COUNT: 20.6 % (ref 11.6–15.1)
GFR SERPL CREATININE-BSD FRML MDRD: 47 ML/MIN/1.73SQ M
GLUCOSE SERPL-MCNC: 116 MG/DL (ref 65–140)
HCT VFR BLD AUTO: 24.2 % (ref 36.5–49.3)
HGB BLD-MCNC: 7.5 G/DL (ref 12–17)
IMM GRANULOCYTES # BLD AUTO: 0.06 THOUSAND/UL (ref 0–0.2)
IMM GRANULOCYTES NFR BLD AUTO: 1 % (ref 0–2)
L PNEUMO1 AG UR QL IA.RAPID: NEGATIVE
LYMPHOCYTES # BLD AUTO: 0.61 THOUSANDS/ÂΜL (ref 0.6–4.47)
LYMPHOCYTES NFR BLD AUTO: 7 % (ref 14–44)
MCH RBC QN AUTO: 26.9 PG (ref 26.8–34.3)
MCHC RBC AUTO-ENTMCNC: 31 G/DL (ref 31.4–37.4)
MCV RBC AUTO: 87 FL (ref 82–98)
MONOCYTES # BLD AUTO: 0.64 THOUSAND/ÂΜL (ref 0.17–1.22)
MONOCYTES NFR BLD AUTO: 7 % (ref 4–12)
NEUTROPHILS # BLD AUTO: 8.09 THOUSANDS/ÂΜL (ref 1.85–7.62)
NEUTS SEG NFR BLD AUTO: 85 % (ref 43–75)
NRBC BLD AUTO-RTO: 0 /100 WBCS
PLATELET # BLD AUTO: 125 THOUSANDS/UL (ref 149–390)
PMV BLD AUTO: 9.4 FL (ref 8.9–12.7)
POTASSIUM SERPL-SCNC: 3.2 MMOL/L (ref 3.5–5.3)
PROCALCITONIN SERPL-MCNC: 1.8 NG/ML
RBC # BLD AUTO: 2.79 MILLION/UL (ref 3.88–5.62)
S PNEUM AG UR QL: NEGATIVE
SL CV LV EF: 55
SODIUM SERPL-SCNC: 130 MMOL/L (ref 135–147)
WBC # BLD AUTO: 9.45 THOUSAND/UL (ref 4.31–10.16)

## 2022-12-07 PROCEDURE — B24BZZ4 ULTRASONOGRAPHY OF HEART WITH AORTA, TRANSESOPHAGEAL: ICD-10-PCS | Performed by: INTERNAL MEDICINE

## 2022-12-07 RX ORDER — POTASSIUM CHLORIDE 20 MEQ/1
40 TABLET, EXTENDED RELEASE ORAL ONCE
Status: COMPLETED | OUTPATIENT
Start: 2022-12-07 | End: 2022-12-07

## 2022-12-07 RX ORDER — PROPOFOL 10 MG/ML
INJECTION, EMULSION INTRAVENOUS AS NEEDED
Status: DISCONTINUED | OUTPATIENT
Start: 2022-12-07 | End: 2022-12-07

## 2022-12-07 RX ORDER — LIDOCAINE HYDROCHLORIDE 10 MG/ML
INJECTION, SOLUTION EPIDURAL; INFILTRATION; INTRACAUDAL; PERINEURAL AS NEEDED
Status: DISCONTINUED | OUTPATIENT
Start: 2022-12-07 | End: 2022-12-07

## 2022-12-07 RX ORDER — SODIUM CHLORIDE 9 MG/ML
INJECTION, SOLUTION INTRAVENOUS CONTINUOUS PRN
Status: DISCONTINUED | OUTPATIENT
Start: 2022-12-07 | End: 2022-12-07

## 2022-12-07 RX ORDER — DOCUSATE SODIUM 100 MG/1
100 CAPSULE, LIQUID FILLED ORAL 2 TIMES DAILY
Status: DISCONTINUED | OUTPATIENT
Start: 2022-12-07 | End: 2022-12-20 | Stop reason: HOSPADM

## 2022-12-07 RX ORDER — POLYETHYLENE GLYCOL 3350 17 G/17G
17 POWDER, FOR SOLUTION ORAL DAILY
Status: DISCONTINUED | OUTPATIENT
Start: 2022-12-07 | End: 2022-12-20 | Stop reason: HOSPADM

## 2022-12-07 RX ADMIN — POTASSIUM CHLORIDE 40 MEQ: 1500 TABLET, EXTENDED RELEASE ORAL at 11:51

## 2022-12-07 RX ADMIN — PROPOFOL 60 MG: 10 INJECTION, EMULSION INTRAVENOUS at 10:40

## 2022-12-07 RX ADMIN — POTASSIUM CHLORIDE 40 MEQ: 1500 TABLET, EXTENDED RELEASE ORAL at 15:57

## 2022-12-07 RX ADMIN — LIDOCAINE HYDROCHLORIDE 50 MG: 10 INJECTION, SOLUTION EPIDURAL; INFILTRATION; INTRACAUDAL; PERINEURAL at 10:40

## 2022-12-07 RX ADMIN — RIVAROXABAN 15 MG: 15 TABLET, FILM COATED ORAL at 08:43

## 2022-12-07 RX ADMIN — ATORVASTATIN CALCIUM 40 MG: 40 TABLET, FILM COATED ORAL at 15:57

## 2022-12-07 RX ADMIN — DULOXETINE HYDROCHLORIDE 60 MG: 60 CAPSULE, DELAYED RELEASE ORAL at 17:57

## 2022-12-07 RX ADMIN — PANTOPRAZOLE SODIUM 40 MG: 40 TABLET, DELAYED RELEASE ORAL at 05:55

## 2022-12-07 RX ADMIN — CEFAZOLIN SODIUM 2000 MG: 2 SOLUTION INTRAVENOUS at 14:05

## 2022-12-07 RX ADMIN — GABAPENTIN 100 MG: 100 CAPSULE ORAL at 21:25

## 2022-12-07 RX ADMIN — CEFAZOLIN SODIUM 2000 MG: 2 SOLUTION INTRAVENOUS at 21:25

## 2022-12-07 RX ADMIN — PROPOFOL 20 MG: 10 INJECTION, EMULSION INTRAVENOUS at 10:41

## 2022-12-07 RX ADMIN — CEFAZOLIN SODIUM 2000 MG: 2 SOLUTION INTRAVENOUS at 05:55

## 2022-12-07 RX ADMIN — ISOSORBIDE MONONITRATE 30 MG: 30 TABLET, EXTENDED RELEASE ORAL at 08:44

## 2022-12-07 RX ADMIN — ALLOPURINOL 200 MG: 100 TABLET ORAL at 08:43

## 2022-12-07 RX ADMIN — Medication 250 MG: at 17:57

## 2022-12-07 RX ADMIN — PROPOFOL 10 MG: 10 INJECTION, EMULSION INTRAVENOUS at 10:44

## 2022-12-07 RX ADMIN — DULOXETINE HYDROCHLORIDE 60 MG: 60 CAPSULE, DELAYED RELEASE ORAL at 08:43

## 2022-12-07 RX ADMIN — PROPOFOL 10 MG: 10 INJECTION, EMULSION INTRAVENOUS at 10:47

## 2022-12-07 RX ADMIN — FINASTERIDE 5 MG: 5 TABLET, FILM COATED ORAL at 08:44

## 2022-12-07 RX ADMIN — UMECLIDINIUM 1 PUFF: 62.5 AEROSOL, POWDER ORAL at 08:44

## 2022-12-07 RX ADMIN — PROPOFOL 10 MG: 10 INJECTION, EMULSION INTRAVENOUS at 10:50

## 2022-12-07 RX ADMIN — Medication 250 MG: at 08:43

## 2022-12-07 RX ADMIN — SODIUM CHLORIDE: 0.9 INJECTION, SOLUTION INTRAVENOUS at 10:29

## 2022-12-07 RX ADMIN — TORSEMIDE 40 MG: 20 TABLET ORAL at 08:43

## 2022-12-07 RX ADMIN — DOCUSATE SODIUM 100 MG: 100 CAPSULE, LIQUID FILLED ORAL at 21:25

## 2022-12-07 RX ADMIN — FLUTICASONE FUROATE AND VILANTEROL TRIFENATATE 1 PUFF: 200; 25 POWDER RESPIRATORY (INHALATION) at 08:44

## 2022-12-07 NOTE — SEDATION DOCUMENTATION
MARY completed by Dr Jose Clark  Patient tolerated well and to be transferred back to floor  NPO for 2 hours post-procedure

## 2022-12-07 NOTE — CONSULTS
Consultation - Cardiology   Vicente Carlton 80 y o  male MRN: [id number]  Unit/Bed#: 2 [address] Encounter: [id number]    Assessment/Plan     Assessment:  1  Staph aureus bacteremia  2  Anemia with history of GI bleed  3  Persistent atrial fibrillation  4  Sick sinus syndrome status post pacemaker  5   Chronic Kidney disease      Plan:  Patient is admitted to the hospital service  1  Patient is currently on IV Ancef 2 g every 8 hours per infectious disease    2  Long discussion with infectious disease, patient and wife at bedside and daughter Leighton Frost on telephone  Per guidelines recommendation would be removal of patient's pacemaker  Due to his dependency and age of the device he would require laser lead extraction at the [facility]  Intermediate to high risk for complications  It was discussed and decision is made that we will continue patient's IV antibiotics with surveillance of his blood cultures  If cultures become clear and remain negative we will not remove his device  3   Family is aware that should cultures not clear or have recurrence, device will need to be removed  4   Discussed with patient and family that at this time due to infection he is not a candidate to have his aortic valve replaced  That would need to be addressed once he is noted to be infection free  5   Patient noted to have decrease in his hemoglobin, with history of GI bleed  It was previously recommended that patient not resume his Plavix as his stents are 1years old and it is not needed  Will attempt to reach out to his primary cardiologist, Dr Sumit Mcmanus to clarify  History of Present Illness   Physician Requesting Consult: Vinayak Cuba MD  Reason for Consult / Principal Problem: Staph bacteremia        HPI: Vicente Carlton is a 80y o  year old male who was admitted on 12/5/2022 with generalized weakness, lethargy and increased urination with incontinence    Patient was worked up and found to have Staphylococcus bacteremia  He underwent MARY today which did not demonstrate any overt vegetation on valves or pacemaker wire  Consult is regarding ongoing treatment and question of whether to remove patient chronic device (pacemaker)  Patient has a history for persistent atrial fibrillation, sick sinus syndrome for which she has a [employer] pacemaker implanted approximately 12 years ago and he recently underwent change of his generator during this past summer  Other history is for moderate to severe aortic stenosis, mitral regurgitation, chronic kidney disease and COPD  Inpatient consult to Cardiology  Consult performed by: NAVA Martinez  Consult ordered by: Julian Latif MD          Review of Systems   Constitutional: Positive for activity change and fever  HENT: Negative  Negative for congestion, facial swelling, sinus pressure and tinnitus  Eyes: Negative  Negative for photophobia and visual disturbance  Respiratory: Negative  Negative for choking and shortness of breath  Cardiovascular: Negative  Negative for chest pain, palpitations and leg swelling  Gastrointestinal: Negative for abdominal distention, diarrhea, nausea and vomiting  Endocrine: Negative  Negative for polydipsia, polyphagia and polyuria  Genitourinary: Negative  Negative for difficulty urinating  Musculoskeletal: Positive for back pain and gait problem  Skin: Negative  Neurological: Positive for weakness and light-headedness  Negative for dizziness and syncope  Hematological: Negative  Psychiatric/Behavioral: Negative          Historical Information   Past Medical History:   Diagnosis Date   • Arthritis    • Atrial flutter ([address] )    • Chronic kidney disease     stage 3   • Coronary artery disease     2 stents   • Fluid retention    • Gout    • Heart disease    • Heart failure [facility])     pacemaker   • Hypertension    • Hyponatremia 04/08/2019   • Neurological disorder    • Pacemaker • Pulmonary emphysema (HCC)    • Radiculopathy     last assessed 1/28/16    • Shortness of breath     exertion   • Sleep apnea     c pap     Past Surgical History:   Procedure Laterality Date   • ANGIOPLASTY      x2 2 stents and then replaced   • CARDIAC PACEMAKER PLACEMENT      pacemaker permanent placement dual chamber / last assessed 4/7/14 / implantation    • CARDIAC SURGERY      pacemaker   • CHOLECYSTECTOMY     • CORONARY ANGIOPLASTY WITH STENT PLACEMENT     • EPIDURAL BLOCK INJECTION N/A 05/26/2016    Procedure: BLOCK / INJECTION EPIDURAL STEROID LUMBAR  L4-5;  Surgeon: Faisal Arredondo MD;  Location: [facility] MAIN OR;  Service:    • EPIDURAL BLOCK INJECTION N/A 02/14/2019    Procedure: L4 L5 Lumbar Epidural Steroid Injection;  Surgeon: Faisal Arredondo MD;  Location: [facility] MAIN OR;  Service: Pain Management    • EYE SURGERY      cataract left   • HAND SURGERY     • HIP PINNING Left    • INSERT / REPLACE / REMOVE PACEMAKER     • KNEE ARTHROSCOPY W/ MENISCAL REPAIR Left    • KNEE SURGERY     • LUMBAR EPIDURAL INJECTION N/A 03/17/2016    Procedure: BLOCK / INJECTION LUMBAR  L4-5  (C-ARM); Surgeon: Faisal Arredondo MD;  Location: [facility] MAIN OR;  Service:    • FL OPEN RX FEMUR FX+INTRAMED ELLA Right 01/31/2022    Procedure: INSERTION NAIL IM FEMUR ANTEGRADE (TROCHANTERIC);   Surgeon: Kelechi Pierre MD;  Location: [facility];  Service: Orthopedics     Social History     Substance and Sexual Activity   Alcohol Use Never     Social History     Substance and Sexual Activity   Drug Use No     E-Cigarette/Vaping   • E-Cigarette Use Never User      E-Cigarette/Vaping Substances   • Nicotine No    • THC No    • CBD No    • Flavoring No    • Other No    • Unknown No      Social History     Tobacco Use   Smoking Status Every Day   • Packs/day: 0 25   • Years: 50 00   • Pack years: 12 50   • Types: Cigarettes   • Start date: 1/1/2022   Smokeless Tobacco Former   Tobacco Comments    quit 02/14/2021     Family History: Family History   Problem Relation Age of Onset   • Cancer Mother 80        Cancer when 80 yrs old   • Heart disease Mother    • Hypertension Mother    • Heart disease Father    • Cancer Father         Heart   46   • Diabetes Neg Hx    • Stroke Neg Hx        Meds/Allergies   all current active meds have been reviewed, current meds:   Current Facility-Administered Medications   Medication Dose Route Frequency   • acetaminophen (TYLENOL) tablet 650 mg  650 mg Oral Q6H PRN   • albuterol inhalation solution 2 5 mg  2 5 mg Nebulization Q6H PRN   • allopurinol (ZYLOPRIM) tablet 200 mg  200 mg Oral Daily   • atorvastatin (LIPITOR) tablet 40 mg  40 mg Oral Daily With Dinner   • ceFAZolin (ANCEF) IVPB (premix in dextrose) 2,000 mg 50 mL  2,000 mg Intravenous Q8H   • DULoxetine (CYMBALTA) delayed release capsule 60 mg  60 mg Oral BID   • finasteride (PROSCAR) tablet 5 mg  5 mg Oral Daily   • fluticasone-vilanterol 200-25 mcg/actuation 1 puff  1 puff Inhalation Daily   • gabapentin (NEURONTIN) capsule 100 mg  100 mg Oral HS   • isosorbide mononitrate (IMDUR) 24 hr tablet 30 mg  30 mg Oral Daily   • ondansetron (ZOFRAN) injection 4 mg  4 mg Intravenous Q6H PRN   • pantoprazole (PROTONIX) EC tablet 40 mg  40 mg Oral Early Morning   • polyethylene glycol (MIRALAX) packet 17 g  17 g Oral Daily PRN   • rivaroxaban (XARELTO) tablet 15 mg  15 mg Oral Daily With Breakfast   • saccharomyces boulardii (FLORASTOR) capsule 250 mg  250 mg Oral BID   • torsemide (DEMADEX) tablet 40 mg  40 mg Oral Daily   • umeclidinium 62 5 mcg/actuation inhaler AEPB 1 puff  1 puff Inhalation Daily    and PTA meds:   Prior to Admission Medications   Prescriptions Last Dose Informant Patient Reported? Taking?    Albuterol Sulfate (albuterol, FOR EMS ONLY,) (2 5 mg/3 mL) 0 083 % nebulizer solution   Yes Yes   Sig: Take 2 5 mg by nebulization every 6 (six) hours as needed for wheezing   Cranberry 1000 MG CAPS   Yes Yes   Sig: Take 1 tablet by mouth 2 (two) times a day  DULoxetine (CYMBALTA) 60 mg delayed release capsule   No Yes   Sig: TAKE 1 CAPSULE TWICE DAILY   Dapagliflozin Propanediol 5 MG TABS   Yes Yes   Sig: Take by mouth daily   allopurinol (ZYLOPRIM) 100 mg tablet   No Yes   Sig: Take 2 tablets (200 mg total) by mouth daily   finasteride (PROSCAR) 5 mg tablet   No Yes   Sig: TAKE 1 TABLET DAILY   fluticasone-umeclidinium-vilanterol (Trelegy Ellipta) 200-62 5-25 MCG/INH AEPB inhaler   No Yes   Sig: Inhale 1 puff daily Rinse mouth after use    gabapentin (NEURONTIN) 100 mg capsule   No Yes   Sig: Take 1 capsule (100 mg total) by mouth daily at bedtime   isosorbide mononitrate (IMDUR) 30 mg 24 hr tablet   Yes Yes   mupirocin (BACTROBAN) 2 % ointment   No Yes   Sig: Apply topically 3 (three) times a day   nystatin (MYCOSTATIN) powder   No Yes   Sig: Apply topically 2 (two) times a day   pantoprazole (PROTONIX) 40 mg tablet   No Yes   Sig: Take 1 tablet (40 mg total) by mouth daily in the early morning Do not start before November 23, 2022  polyethylene glycol (MIRALAX) 17 g packet   Yes Yes   Sig: Take 17 g by mouth daily as needed   rivaroxaban (XARELTO) 15 mg tablet   No Yes   Sig: Take 1 tablet (15 mg total) by mouth daily with breakfast   simvastatin (ZOCOR) 20 mg tablet   No Yes   Sig: TAKE 1 TABLET DAILY AT     BEDTIME   torsemide 40 MG TABS   No Yes   Sig: Take 40 mg by mouth daily Do not start before November 23, 2022  Facility-Administered Medications: None     No Known Allergies    Objective   Vitals: Blood pressure 101/57, pulse 69, temperature 97 5 °F (36 4 °C), resp  rate 20, height 6' 2" (1 88 m), weight 120 kg (265 lb), SpO2 99 %    Orthostatic Blood Pressures    Flowsheet Row Most Recent Value   Blood Pressure 101/57 filed at 12/07/2022 1127   Patient Position - Orthostatic VS Lying filed at 12/06/2022 0850            Intake/Output Summary (Last 24 hours) at 12/7/2022 1347  Last data filed at 12/7/2022 1054  Gross per 24 hour   Intake 250 ml   Output 800 ml   Net -550 ml       Invasive Devices     Peripheral Intravenous Line  Duration           Peripheral IV 12/05/22 Right Antecubital 1 day                Physical Exam  Vitals and nursing note reviewed  Constitutional:       General: He is not in acute distress  Appearance: Normal appearance  He is morbidly obese  HENT:      Right Ear: External ear normal       Left Ear: External ear normal    Eyes:      General: No scleral icterus  Right eye: No discharge  Left eye: No discharge  Cardiovascular:      Rate and Rhythm: Normal rate and regular rhythm  Pulses: Normal pulses  Heart sounds: Murmur heard  Systolic murmur is present with a grade of 2/6  Pulmonary:      Effort: Pulmonary effort is normal  No accessory muscle usage or respiratory distress  Breath sounds: Examination of the right-lower field reveals decreased breath sounds  Examination of the left-lower field reveals decreased breath sounds  Decreased breath sounds present  Abdominal:      General: Bowel sounds are normal  There is no distension  Palpations: Abdomen is soft  Musculoskeletal:      Right lower leg: Edema present  Left lower leg: Edema present  Comments: Component of venous stasis   Skin:     General: Skin is warm and dry  Capillary Refill: Capillary refill takes less than 2 seconds  Neurological:      General: No focal deficit present  Mental Status: He is alert and oriented to person, place, and time  Mental status is at baseline  Psychiatric:         Mood and Affect: Mood normal          Behavior: Behavior is cooperative  Lab Results:   I have personally reviewed pertinent lab results      CBC with diff:   Results from last 7 days   Lab Units 12/07/22  0516   WBC Thousand/uL 9 45   RBC Million/uL 2 79*   HEMOGLOBIN g/dL 7 5*   HEMATOCRIT % 24 2*   MCV fL 87   MCH pg 26 9   MCHC g/dL 31 0*   RDW % 20 6*   MPV fL 9 4   PLATELETS Thousands/uL 125* CMP:   Results from last 7 days   Lab Units 12/07/22  0516 12/06/22  0834 12/05/22  1919   SODIUM mmol/L 130*   < > 135   CHLORIDE mmol/L 95*   < > 96   CO2 mmol/L 28   < > 29   BUN mg/dL 27*   < > 32*   CREATININE mg/dL 1 38*   < > 1 65*   CALCIUM mg/dL 8 2*   < > 8 6   AST U/L  --   --  33   ALT U/L  --   --  23   ALK PHOS U/L  --   --  85   EGFR ml/min/1 73sq m 47   < > 38    < > = values in this interval not displayed  BNP:   Results from last 7 days   Lab Units 12/07/22  0516   POTASSIUM mmol/L 3 2*   CHLORIDE mmol/L 95*   CO2 mmol/L 28   BUN mg/dL 27*   CREATININE mg/dL 1 38*   CALCIUM mg/dL 8 2*   EGFR ml/min/1 73sq m 47     Coags:   Results from last 7 days   Lab Units 12/05/22  1948   PTT seconds 32   INR  1 74*     Magnesium:   Results from last 7 days   Lab Units 12/06/22  0834   MAGNESIUM mg/dL 1 9     Lipid Profile:   Results from last 7 days   Lab Units 12/06/22  0834   HDL mg/dL 37*   LDL CALC mg/dL 36   TRIGLYCERIDES mg/dL 55     Imaging: I have personally reviewed pertinent reports      EKG: Ventricular pacing with underlying atrial fibrillation  VTE Prophylaxis: Sequential compression device Jessica Syed     Code Status: Level 1 - Full Code  Advance Directive and Living Will: Yes    Power of :    POLST:      Cathie Castellanos  Cardiology

## 2022-12-07 NOTE — PLAN OF CARE
Problem: Potential for Falls  Goal: Patient will remain free of falls  Description: INTERVENTIONS:  - Educate patient/family on patient safety including physical limitations  - Instruct patient to call for assistance with activity   - Consult OT/PT to assist with strengthening/mobility   - Keep Call bell within reach  - Keep bed low and locked with side rails adjusted as appropriate  - Keep care items and personal belongings within reach  - Initiate and maintain comfort rounds  - Make Fall Risk Sign visible to staff  - Offer Toileting every 2 Hours, in advance of need  - Initiate/Maintain bed alarm  - Obtain necessary fall risk management equipment: walker  - Apply yellow socks and bracelet for high fall risk patients  - Consider moving patient to room near nurses station  Outcome: Progressing     Problem: RESPIRATORY - ADULT  Goal: Achieves optimal ventilation and oxygenation  Description: INTERVENTIONS:  - Assess for changes in respiratory status  - Assess for changes in mentation and behavior  - Position to facilitate oxygenation and minimize respiratory effort  - Oxygen administered by appropriate delivery if ordered  - Initiate smoking cessation education as indicated  - Encourage broncho-pulmonary hygiene including cough, deep breathe, Incentive Spirometry  - Assess the need for suctioning and aspirate as needed  - Assess and instruct to report SOB or any respiratory difficulty  - Respiratory Therapy support as indicated  Outcome: Progressing     Problem: MOBILITY - ADULT  Goal: Maintain or return to baseline ADL function  Description: INTERVENTIONS:  -  Assess patient's ability to carry out ADLs; assess patient's baseline for ADL function and identify physical deficits which impact ability to perform ADLs (bathing, care of mouth/teeth, toileting, grooming, dressing, etc )  - Assess/evaluate cause of self-care deficits   - Assess range of motion  - Assess patient's mobility; develop plan if impaired  - Assess patient's need for assistive devices and provide as appropriate  - Encourage maximum independence but intervene and supervise when necessary  - Involve family in performance of ADLs  - Assess for home care needs following discharge   - Consider OT consult to assist with ADL evaluation and planning for discharge  - Provide patient education as appropriate  Outcome: Progressing  Goal: Maintains/Returns to pre admission functional level  Description: INTERVENTIONS:  - Perform BMAT or MOVE assessment daily    - Set and communicate daily mobility goal to care team and patient/family/caregiver  - Collaborate with rehabilitation services on mobility goals if consulted  - Perform Range of Motion 3 times a day    - Dangle patient 3 times a day  - Stand patient 3 times a day  - Ambulate patient 3 times a day  - Out of bed to chair 3 times a day   - Out of bed for meals 3 times a day  - Out of bed for toileting  - Record patient progress and toleration of activity level   Outcome: Progressing     Problem: Prexisting or High Potential for Compromised Skin Integrity  Goal: Skin integrity is maintained or improved  Description: INTERVENTIONS:  - Identify patients at risk for skin breakdown  - Assess and monitor skin integrity  - Assess and monitor nutrition and hydration status  - Monitor labs   - Assess for incontinence   - Turn and reposition patient  - Assist with mobility/ambulation  - Relieve pressure over bony prominences  - Avoid friction and shearing  - Provide appropriate hygiene as needed including keeping skin clean and dry  - Evaluate need for skin moisturizer/barrier cream  - Collaborate with interdisciplinary team   - Patient/family teaching  - Consider wound care consult   Outcome: Progressing     Problem: Neurological Deficit  Goal: Neurological status is stable or improving  Description: Interventions:  - Monitor and assess patient's level of consciousness, motor function, sensory function, and level of assistance needed for ADLs  - Monitor and report changes from baseline  Collaborate with interdisciplinary team to initiate plan and implement interventions as ordered  - Provide and maintain a safe environment  - Consider seizure precautions  - Consider fall precautions  - Consider aspiration precautions  - Consider bleeding precautions  Outcome: Progressing     Problem: Activity Intolerance/Impaired Mobility  Goal: Mobility/activity is maintained at optimum level for patient  Description: Interventions:  - Assess and monitor patient  barriers to mobility and need for assistive/adaptive devices  - Assess patient's emotional response to limitations  - Collaborate with interdisciplinary team and initiate plans and interventions as ordered  - Encourage independent activity per ability   - Maintain proper body alignment  - Perform active/passive rom as tolerated/ordered  - Plan activities to conserve energy   - Turn patient as appropriate  Outcome: Progressing     Problem: Communication Impairment  Goal: Ability to express needs and understand communication  Description: Assess patient's communication skills and ability to understand information  Patient will demonstrate use of effective communication techniques, alternative methods of communication and understanding even if not able to speak  - Encourage communication and provide alternate methods of communication as needed  - Collaborate with case management/ for discharge needs  - Include patient/family/caregiver in decisions related to communication  Outcome: Progressing     Problem: Potential for Aspiration  Goal: Non-ventilated patient's risk of aspiration is minimized  Description: Assess and monitor vital signs, respiratory status, and labs (WBC)  Monitor for signs of aspiration (tachypnea, cough, rales, wheezing, cyanosis, fever)  - Assess and monitor patient's ability to swallow    - Place patient up in chair to eat if possible  - HOB up at 90 degrees to eat if unable to get patient up into chair   - Supervise patient during oral intake  - Instruct patient/ family to take small bites  - Instruct patient/ family to take small single sips when taking liquids  - Follow patient-specific strategies generated by speech pathologist   Outcome: Progressing     Problem: Nutrition  Goal: Nutrition/Hydration status is improving  Description: Monitor and assess patient's nutrition/hydration status for malnutrition (ex- brittle hair, bruises, dry skin, pale skin and conjunctiva, muscle wasting, smooth red tongue, and disorientation)  Collaborate with interdisciplinary team and initiate plan and interventions as ordered  Monitor patient's weight and dietary intake as ordered or per policy  Utilize nutrition screening tool and intervene per policy  Determine patient's food preferences and provide high-protein, high-caloric foods as appropriate  - Assist patient with eating   - Allow adequate time for meals   - Encourage patient to take dietary supplement as ordered  - Collaborate with clinical nutritionist   - Include patient/family/caregiver in decisions related to nutrition  Outcome: Progressing     Problem: Nutrition/Hydration-ADULT  Goal: Nutrient/Hydration intake appropriate for improving, restoring or maintaining nutritional needs  Description: Monitor and assess patient's nutrition/hydration status for malnutrition  Collaborate with interdisciplinary team and initiate plan and interventions as ordered  Monitor patient's weight and dietary intake as ordered or per policy  Utilize nutrition screening tool and intervene as necessary  Determine patient's food preferences and provide high-protein, high-caloric foods as appropriate       INTERVENTIONS:  - Monitor oral intake, urinary output, labs, and treatment plans  - Assess nutrition and hydration status and recommend course of action  - Evaluate amount of meals eaten  - Assist patient with eating if necessary   - Allow adequate time for meals  - Recommend/ encourage appropriate diets, oral nutritional supplements, and vitamin/mineral supplements  - Order, calculate, and assess calorie counts as needed  - Recommend, monitor, and adjust tube feedings and TPN/PPN based on assessed needs  - Assess need for intravenous fluids  - Provide specific nutrition/hydration education as appropriate  - Include patient/family/caregiver in decisions related to nutrition  Outcome: Progressing

## 2022-12-07 NOTE — OCCUPATIONAL THERAPY NOTE
Occupational Therapy Evaluation/Treatment        12/07/22 1011   Note Type   Note type Evaluation   Pain Assessment   Pain Assessment Tool 0-10   Pain Score 3   Pain Location/Orientation Location: Neck   Home Living   Type of [address] One level;Stairs to enter with rails  (2 NATHEN)   Bathroom Shower/Tub Walk-in shower   Bathroom Toilet Standard   Bathroom Equipment Grab bars in shower; [address]; Hospital bed; Wheelchair-manual   Additional Comments Patient reports PTA independent in ADLs and ambulate to ambulate approx  100 feet independently with RW   Prior Function   Level of Miner Independent with ADLs; Needs assistance with ADLs   Lives With Spouse;Daughter  (Wife and 2 daughters)   Receives Help From Family   IADLs Independent with driving;Family/Friend/Other provides meals   General   Additional Pertinent History Patient presented to hospital with c/o generalized weakness and metabolic encephalopathy   ADL   Eating Assistance 7  Independent   Grooming Assistance 5  Supervision/Setup   UB Bathing Assistance 4  Minimal Assistance   LB Bathing Assistance 3  Moderate Assistance   UB Dressing Assistance 4  Minimal Assistance   LB Dressing Assistance 3  Moderate Assistance   Toileting Assistance  3  Moderate Assistance   Bed Mobility   Supine to Sit 4  Minimal assistance   Additional items Assist x 1   Transfers   Sit to Stand 4  Minimal assistance   Additional items Assist x 1;Verbal cues  (Bed elevated)   Stand to Sit 4  Minimal assistance   Additional items Assist x 1;Verbal cues   Functional Mobility   Functional Mobility 4  Minimal assistance   Additional Comments Few feet bed to chair with RW   Balance   Static Sitting Fair +   Dynamic Sitting Fair   Static Standing Fair   Dynamic Standing Fair -   Activity Tolerance   Activity Tolerance Patient limited by fatigue   RUE Assessment   RUE Assessment WFL   LUE Assessment   LUE Assessment WFL   Cognition   Overall Cognitive Status WFL   Arousal/Participation Alert; Cooperative   Attention Attends with cues to redirect   Orientation Level Oriented X4   Following Commands Follows multistep commands with increased time or repetition   Comments Slow to process at times but able to follow all commands with increased time   Assessment   Limitation Decreased ADL status; Decreased UE strength;Decreased Safe judgement during ADL;Decreased endurance;Decreased self-care trans;Decreased high-level ADLs   Prognosis Good   Assessment Patient evaluated by Occupational Therapy  Patient admitted with Sepsis ([facility] [address] )  The patients occupational profile, medical and therapy history includes a extensive additional review of physical, cognitive, or psychosocial history related to current functional performance  Comorbidities affecting functional mobility and ADLS include: arthritis, CAD, PE, heart failure, HTN, pacemaker, CKD, gout, Aflutter, hyponatremia, heart disease  Prior to admission, patient was independent with functional mobility with RW, independent with ADLS, requiring assist for IADLS and home with family assist   The evaluation identifies the following performance deficits: weakness, impaired balance, decreased endurance, increased fall risk, new onset of impairment of functional mobility, decreased ADLS, decreased IADLS, decreased activity tolerance, decreased safety awareness and decreased strength, that result in activity limitations and/or participation restrictions  This evaluation requires clinical decision making of high complexity, because the patient presents with comorbidites that affect occupational performance and required significant modification of tasks or assistance with consideration of multiple treatment options  The Barthel Index was used as a functional outcome tool presenting with a score of Barthel Index Score: 45, indicating marked limitations of functional mobility and ADLS    The patient's raw score on the AM-PAC Daily Activity inpatient short form is 18, standardized score is 38 66, less than 39 4  Patients at this level are likely to benefit from DC to post-acute rehabilitation services  Please refer to the recommendation of the Occupational Therapist for safe DC planning  Anticipate as patient continues to work with OT and PT and progresses, may be able to return home with addition of home therapy services and continued family support  Patient will benefit from skilled Occupational Therapy services to address above deficits and facilitate a safe return to prior level of function  Goals   Patient Goals 'I need to be independent'   STG Time Frame   (1-7 days)   Short Term Goal  Goals established to promote Patient Goals: 'I need to be independent':  Grooming: independent seated; Bathing: min assist; Upper Body Dressing supervision; Lower Body Dressing: min assist; Toileting: min assist; Patient will increase ambulatory standard toilet transfer to supervision  With RW to increase performance and safety with ADLS and functional mobility; Patient will increase standing tolerance to 5 minutes during ADL task to decrease assistance level and decrease fall risk; Patient will increase bed mobility to supervision in preparation for ADLS and transfers;  Patient will increase functional mobility to and from bathroom with rolling walker with supervision to increase performance with ADLS and to use a toilet; Patient will tolerate 5 minutes of UE ROM/strengthening to increase general activity tolerance and performance in ADLS/IADLS; Patient will improve functional activity tolerance to 5 minutes of sustained functional tasks to increase participation in basic self-care and decrease assistance level;  Patient will be able to to verbalize understanding and perform energy conservation/proper body mechanics during ADLS and functional mobility at least 75% of the time with minimal cueing to decrease signs of fatigue and increase stamina to return to prior level of function; Patient will increase dynamic sitting balance to fair+ to improve the ability to sit at edge of bed or on a chair for ADLS;  Patient will increase dynamic standing balance to fair to improve postural stability and decrease fall risk during standing ADLS and transfers  LTG Time Frame   (8-14 days)   Long Term Goal Grooming: independent standing at sink; Bathing: supervision,;Upper Body Dressing independent; Lower Body Dressing: supervision; Toileting: supervision; Patient will increase ambulatory standard toilet transfer to independent  With RW to increase performance and safety with ADLS and functional mobility Patient will increase standing tolerance to 10 minutes during ADL task to decrease assistance level and decrease fall risk; Patient will increase bed mobility to independent in preparation for ADLS and transfers; Patient will increase functional mobility to and from bathroom with rolling walker independently to increase performance with ADLS and to use a toilet; Patient will tolerate 10 minutes of UE ROM/strengthening to increase general activity tolerance and performance in ADLS/IADLS; Patient will improve functional activity tolerance to 10 minutes of sustained functional tasks to increase participation in basic self-care and decrease assistance level;  Patient will be able to to verbalize understanding and perform energy conservation/proper body mechanics during ADLS and functional mobility at least 90% of the time with no cueing to decrease signs of fatigue and increase stamina to return to prior level of function; Patient will increase static/dynamic sitting balance to good to improve the ability to sit at edge of bed or on a chair for ADLS;  Patient will increase static/dynamic standing balance to fair+ to improve postural stability and decrease fall risk during standing ADLS and transfers    Pt will score >/= 22/24 on AM-PAC Daily Activity Inpatient scale to promote safe independence with ADLs and functional mobility; Pt will score >/= 85/100 on Barthel Index in order to decrease caregiver assistance needed and increase ability to perform ADLs and functional mobility  Plan   Treatment Interventions ADL retraining;Functional transfer training;UE strengthening/ROM; Endurance training;Patient/family training;Equipment evaluation/education; Compensatory technique education;Continued evaluation; Energy conservation; Activityengagement   Goal Expiration Date 12/21/22   OT Frequency 3-5x/wk   Recommendation   OT Discharge Recommendation Post acute rehabilitation services   AM-PAC Daily Activity Inpatient   Lower Body Dressing 2   Bathing 2   Toileting 3   Upper Body Dressing 3   Grooming 4   Eating 4   Daily Activity Raw Score 18   Daily Activity Standardized Score (Calc for Raw Score >=11) 38 66   AM-PAC Applied Cognition Inpatient   Following a Speech/Presentation 3   Understanding Ordinary Conversation 4   Taking Medications 3   Remembering Where Things Are Placed or Put Away 4   Remembering List of 4-5 Errands 3   Taking Care of Complicated Tasks 3   Applied Cognition Raw Score 20   Applied Cognition Standardized Score 41 76   Barthel Index   Feeding 10   Bathing 0   Grooming Score 0   Dressing Score 5   Bladder Score 10   Bowels Score 5   Toilet Use Score 5   Transfers (Bed/Chair) Score 10   Mobility (Level Surface) Score 0   Stairs Score 0   Barthel Index Score 45   Additional Treatment Session   Start Time 1001   End Time 1011   Treatment Assessment Patient seen for OT treatment following evaluation  Patient required increased time to complete all functional tasks due to fatigue   Patient performe static sitting EOB approx 5 minutes in preparation for seated ADLs with Fair+ balance; sit to stand from bedside chair with min assist and RW for support; ambulated few feet back to bed with RW and min assist (nursing assistant entered room to bring patient down for procedure); sit to supine with supervision;  Throughout session patient expressing his desire to become more independent and trial all functional activities without assist from OT , demonstrates good motivation to return to PLOF; at end of session patient supine in bed with all needs met   End of Consult   Education Provided Yes   Patient Position at End of Consult Supine   End of Consult Comments Pt returned to bed; in care of PCA in preparation for transport to procedure   Licensure   [id number] Number  Finis Hug, OTR/L [id number]

## 2022-12-07 NOTE — PROGRESS NOTES
[address]  Progress Note Amy Starkey 1940, 80 y o  male MRN: [id number]  Unit/Bed#: 26[facility] Encounter: [id number]  Primary Care Provider: Braden Rich DO   Date and time admitted to hospital: 12/5/2022  6:39 PM    *MSSA bacteremia  Assessment & Plan  Patient presents with somnolence for 24 hours and generalized weakness  Patient reports a little cough and runny nose, denies sore throat/SOB/fever/chills  · Patient hospitalized in November 2022 for symptomatic anemia, hemoglobin 7 1 on admission with positive stool guaiac, received 1 unit of PRBC, underwent EGD and colonoscopy both showed no evidence of bleeding, patient was recommended outpatient capsule endoscopy which is still pending in epic  Plavix was discontinued on discharge, patient remained on Xarelto  · WBC 15 38, repeat 14 80, no bands, patient afebrile  · Lactic acid normal   · Procalcitonin 1 79  · Chest x-ray showed no evidence of pneumonia  · UA no evidence of infection   · COVID-19, flu RSV negative  · CT chest showed -  Numerous new pulmonary nodules, concerning for metastatic disease;Mediastinal and hilar lymphadenopathy, also concerning for metastases; Small right pleural effusion with adjacent basal opacity, most likely represents atelectasis  · Patient was given Zosyn in the ED and was later de-escalated to Rocephin  Later antibiotics were changed to Ancef 2 g every 8 hours  · Blood cultures 2 out of 2 growing Staphylococcus aureus  Bacterial identification panel showing MSSA     · ID input appreciated  · Echocardiogram showed EF of 60% with severe concentric hypertrophy, EF of 60% without any visible vegetations with severe attic stenosis  · Patient underwent MARY which showed EF of 55% without any large vegetation but cannot rule out small vegetation due to calcium and acoustic shadowing  · Discussed in detail with ID, family and cardiology  · Unclear source-possible from skin translocation as patient noted to have multiple scabs  · CAT scan showing multiple lung nodules-questionable septic emboli  · Pacemaker removal was recommended by Marika with cardiology  Pacemaker has been in place for 12 years and removal would be an extensive surgical procedure  Given multiple comorbidities cardiology recommending conservative approach and treatment with IV antibiotics with repeat blood cultures  · Follow-up repeat blood cultures  · Anticipate course of 6 weeks of antibiotics from the date of blood culture clearance  Blood cultures continue remain positive patient might need explantation of the pacemaker    Acute encephalopathy  Assessment & Plan  Likely acute toxic metabolic encephalopathy due to underlying infection, rule out CVA  Patient reports sleepy and feeling weak, oriented to place and person day of week on exam   · CT head showed - Hypodensity in the right parieto-occipital region most likely represents a chronic infarct, however no prior study is available to confirm this    Also differential could be metastatic disease based on the CT of the chest  · No focal neuro deficit on exam   Mental status has improved significantly with treatment of sepsis  · ABG no hypercapnia  · Patient was initially worked up for possible stroke  · Neurology input appreciated  · Continue Xarelto 15 mg p o  daily and Zocor 20 mg p o  daily  · Patient could not get MRI of the brain due to incompatible pacemaker  · Was given full dose aspirin in the ED along with Lipitor 80 mg p o  daily and was continued on Lipitor 40 mg p o  daily  · Hemoglobin A1c was 5 1  ·  neurology recommending repeating CAT scan in 1 week  · PT/OT evaluation and treatment    Abnormal CAT scan  Assessment & Plan  CT Chest showed numerous new pulmonary nodules concerning for metastatic disease along with mediastinal and hilar lymphadenopathy and small right pleural effusion  · Patient had CT abdomen p bon without contrast on 11/19/2022 which was unremarkable  · CT of the brain also showing hypodensity in the right parieto-occipital region which might represent chronic infarct/metastasis  · Pulmonary was consulted who is recommending IR guided lung biopsy  · Patient did have a colonoscopy during prior hospitalization which showed no masses  · No history of malignancy as per family  · Bilateral lung nodules could also be related to septic emboli  patient might need repeat CAT scan in 3 months  If the nodules are improving patient might not need further work-up but if the nodules are persistent might need biopsy      Elevated troponin  Assessment & Plan  Mild troponin elevation most likely non MI troponin elevation secondary to sepsis  · EKG showed paced rhythm      Chronic diastolic heart failure (HCC)  Assessment & Plan  Wt Readings from Last 3 Encounters:   12/05/22 120 kg (265 lb)   11/22/22 121 kg (265 lb 10 5 oz)   10/20/22 120 kg (265 lb)     On Demadex 40 mg p o  Daily at home  · 2D echo in January 2022 showed normal EF, 65%, moderate concentric hypertrophy, dilated atriums, severe AS, mild to moderate AR, moderate MS  · Chest x-ray appears unremarkable to me, pending final read  · CT chest showed numerous new pulmonary nodules with mediastinal and hilar lymphadenopathy small right pleural effusion  · 1-2+ lower leg edema on exam, albumin 2 6  · BNP was elevated at 14,000  Demadex has been on hold in the setting of this      Depression with anxiety  Assessment & Plan  Continue Cymbalta    Anemia  Assessment & Plan  Baseline hemoglobin appears to be around 7-9s  · Hemoglobin 9 1, repeat 8 1 in ED  No evidence of bleeding  · hemoGlobin dropped to 7 5  · Monitor hemoglobin and transfuse if less than 7  · History of symptomatic anemia during prior hospitalization  Patient had EGD which showed no sign of upper GI bleeding with mild gastritis and Candida esophagitis  Colonoscopy also showed no evidence of bleeding    Patient was recommended to follow-up outpatient with GI for capsule endoscopy  Moderate to severe aortic stenosis  Assessment & Plan  Severe AS on echo in 1/2022  Underwent MARY which showed mild to moderate concentric hypertrophy with EF of 55% with moderately thickened aortic valve with severe aortic stenosis    Gastro-esophageal reflux disease without esophagitis  Assessment & Plan  Continue PPI    COPD (chronic obstructive pulmonary disease) ([facility])  Assessment & Plan  On Trelegy, albuterol nebulizer p r n  At home  · Was initially on 2 L oxygen but currently stable on room air  · Substitute Trelegy with Breo and Incruse  · Continue albuterol as needed      Obesity (BMI 30-39  9)  Assessment & Plan  Body mass index is 34 02 kg/m²  · Diet and lifestyle modification    MSSA bacteremia  Assessment & Plan  Plan as per above    CAD (coronary artery disease)  Assessment & Plan  Status post PCI with stents x 2  On Imdur, statin at home  · Continue Imdur, statin and Xarelto  · Patient was recommended to stop Plavix during prior admission      Stage 3a chronic kidney disease [facility])  Assessment & Plan  Lab Results   Component Value Date    EGFR 40 12/06/2022    EGFR 38 12/05/2022    EGFR 38 11/22/2022    CREATININE 1 58 (H) 12/06/2022    CREATININE 1 65 (H) 12/05/2022    CREATININE 1 63 (H) 11/22/2022     Baseline creatinine appears to be around 1 3-1 6s  · Creatinine stable  · Monitor    JOVI (obstructive sleep apnea)  Assessment & Plan  Continue CPAP at HS    Hyperlipidemia  Assessment & Plan  Continue statin    Chronic a-fib [facility])  Assessment & Plan  Status post pacemaker insertion  On Xarelto at home  · Continue Xarelto  · Optimize electrolytes    Benign prostatic hyperplasia  Assessment & Plan  Continue Proscar  · Monitor postvoid residual    Benign hypertension with chronic kidney disease, stage III ([address] )  Assessment & Plan  On Demadex at home    · Demadex have been on hold in the setting of sepsis  · Can resume Demadex if creatinine continues to remain stable    Constipation-started on Colace and MiraLAX     VTE Pharmacologic Prophylaxis:   High Risk (Score >/= 5) - Pharmacological DVT Prophylaxis Ordered: rivaroxaban (Xarelto)  Sequential Compression Devices Ordered  Patient Centered Rounds: I performed bedside rounds with nursing staff today  Discussions with Specialists or Other Care Team Provider: ID, cardiology    Education and Discussions with Family / Patient: Updated  (wife and daughter) at bedside  Time Spent for Care: 60 minutes  More than 50% of total time spent on counseling and coordination of care as described above  Current Length of Stay: 1 day(s)  Current Patient Status: Inpatient   Certification Statement: The patient will continue to require additional inpatient hospital stay due to Sepsis, MSSA bacteremia  Discharge Plan: Anticipate discharge in 48-72 hrs to pEnding course    Code Status: Level 1 - Full Code    Subjective:   Patient is more awake and alert and feeling much better today  Denies any chest pain, shortness of breath, abdominal pain, nausea or vomiting  Patient did not have a bowel movement in 3 days  Objective:     Vitals:   Temp (24hrs), Av 5 °F (36 4 °C), Min:97 °F (36 1 °C), Max:98 °F (36 7 °C)    Temp:  [97 °F (36 1 °C)-98 °F (36 7 °C)] 97 °F (36 1 °C)  HR:  [69-79] 79  Resp:  [17-22] 20  BP: ()/(50-61) 105/52  SpO2:  [95 %-100 %] 96 %  Body mass index is 34 02 kg/m²  Input and Output Summary (last 24 hours): Intake/Output Summary (Last 24 hours) at 2022 1720  Last data filed at 2022 1405  Gross per 24 hour   Intake 250 ml   Output 1300 ml   Net -1050 ml       Physical Exam:   Physical Exam  Constitutional:       Appearance: Normal appearance  HENT:      Head: Normocephalic and atraumatic  Eyes:      Extraocular Movements: Extraocular movements intact  Pupils: Pupils are equal, round, and reactive to light     Cardiovascular:      Rate and Rhythm: Normal rate  Rhythm irregular  Heart sounds: Murmur heard  No gallop  Pulmonary:      Effort: Pulmonary effort is normal       Breath sounds: Normal breath sounds  Abdominal:      General: Bowel sounds are normal       Palpations: Abdomen is soft  Tenderness: There is no abdominal tenderness  Musculoskeletal:         General: No swelling or deformity  Normal range of motion  Cervical back: Normal range of motion and neck supple  Skin:     General: Skin is warm and dry  Comments: Bruising noted in both upper arms   Neurological:      General: No focal deficit present  Mental Status: He is alert  Additional Data:     Labs:  Results from last 7 days   Lab Units 12/07/22 0516 12/06/22 0834 12/06/22 0104   WBC Thousand/uL 9 45   < > 14 80*   HEMOGLOBIN g/dL 7 5*   < > 8 1*   HEMATOCRIT % 24 2*   < > 27 2*   PLATELETS Thousands/uL 125*   < > 158   BANDS PCT %  --   --  7   NEUTROS PCT % 85*   < >  --    LYMPHS PCT % 7*   < >  --    LYMPHO PCT %  --   --  3*   MONOS PCT % 7   < >  --    MONO PCT %  --   --  8   EOS PCT % 0   < > 0    < > = values in this interval not displayed  Results from last 7 days   Lab Units 12/07/22 0516 12/06/22 0834 12/05/22  1919   SODIUM mmol/L 130*   < > 135   POTASSIUM mmol/L 3 2*   < > 3 7   CHLORIDE mmol/L 95*   < > 96   CO2 mmol/L 28   < > 29   BUN mg/dL 27*   < > 32*   CREATININE mg/dL 1 38*   < > 1 65*   ANION GAP mmol/L 7   < > 10   CALCIUM mg/dL 8 2*   < > 8 6   ALBUMIN g/dL  --   --  2 6*   TOTAL BILIRUBIN mg/dL  --   --  1 70*   ALK PHOS U/L  --   --  85   ALT U/L  --   --  23   AST U/L  --   --  33   GLUCOSE RANDOM mg/dL 116   < > 140    < > = values in this interval not displayed       Results from last 7 days   Lab Units 12/05/22 1948   INR  1 74*         Results from last 7 days   Lab Units 12/06/22 0104   HEMOGLOBIN A1C % 5 1     Results from last 7 days   Lab Units 12/07/22 0516 12/06/22 0104 12/05/22 1948 LACTIC ACID mmol/L  --   --  1 8   PROCALCITONIN ng/ml 1 80* 1 79*  --        Lines/Drains:  Invasive Devices     Peripheral Intravenous Line  Duration           Peripheral IV 12/05/22 Right Antecubital 1 day                  Telemetry:  Telemetry Orders (From admission, onward)             48 Hour Telemetry Monitoring  Continuous x 48 hours        Expiring   References:    Telemetry Guidelines   Question:  Reason for 48 Hour Telemetry  Answer:  Acute MI, chest pain - R/O MI, or unstable angina                 Telemetry Reviewed: Atrial fibrillation with heart rate ranging from 70-80  Indication for Continued Telemetry Use: No indication for continued use  Will discontinue  Imaging: Reviewed radiology reports from this admission including: chest xray, chest CT scan and CT head    Recent Cultures (last 7 days):   Results from last 7 days   Lab Units 12/07/22  0549 12/06/22 2209 12/05/22  1948   BLOOD CULTURE  Received in Microbiology Lab  Culture in Progress  Received in Microbiology Lab  Culture in Progress    --  Staphylococcus aureus*  Staphylococcus aureus*   GRAM STAIN RESULT   --   --  Gram positive cocci in clusters*  Gram positive cocci in clusters*   LEGIONELLA URINARY ANTIGEN   --  Negative  --        Last 24 Hours Medication List:   Current Facility-Administered Medications   Medication Dose Route Frequency Provider Last Rate   • acetaminophen  650 mg Oral Q6H PRN NAVA Coleman     • albuterol  2 5 mg Nebulization Q6H PRN NAVA Coleman     • allopurinol  200 mg Oral Daily NAVA Coleman     • atorvastatin  40 mg Oral Daily With NAVA French     • cefazolin  2,000 mg Intravenous Q8H Wade Gerber MD 2,000 mg (12/07/22 1405)   • docusate sodium  100 mg Oral BID Ron Ellis MD     • DULoxetine  60 mg Oral BID NAVA Coleman     • finasteride  5 mg Oral Daily NAVA Coleman     • fluticasone-vilanterol  1 puff Inhalation Daily NAVA Correa • gabapentin  100 mg Oral HS NAVA Coleman     • isosorbide mononitrate  30 mg Oral Daily NAVA Coleman     • ondansetron  4 mg Intravenous Q6H PRN NAVA Coleman     • pantoprazole  40 mg Oral Early Morning NAVA Coleman     • polyethylene glycol  17 g Oral Daily PRN NAVA Coleman     • polyethylene glycol  17 g Oral Daily Devendra Pennington MD     • rivaroxaban  15 mg Oral Daily With Breakfast NAVA Coleman     • saccharomyces boulardii  250 mg Oral BID NAVA Coleman     • torsemide  40 mg Oral Daily NAVA Coleman     • umeclidinium  1 puff Inhalation Daily NAVA Coleman          Today, Patient Was Seen By: Devendra Pennington MD    **Please Note: This note may have been constructed using a voice recognition system  **

## 2022-12-07 NOTE — SPEECH THERAPY NOTE
Pt NPO for MARY  Plan: f/u within 24-48 hrs to ensure safe and effective oral intake over time     Randa Romeo [address] [place] [address] [phone]

## 2022-12-07 NOTE — PHYSICAL THERAPY NOTE
PHYSICAL THERAPY EVALUATION/TREATMENT     12/07/22 1515   Note Type   Note Type Treatment   Pain Assessment   Pain Assessment Tool 0-10   Pain Score No Pain   Restrictions/Precautions   Other Precautions Fall Risk; Chair Alarm; Bed Alarm   General   Chart Reviewed Yes   Family/Caregiver Present Yes  (wife)   Cognition   Orientation Level Oriented X4   Subjective   Subjective "I feel fine"   Bed Mobility   Supine to Sit 3  Moderate assistance   Additional items LE management; Increased time required   Sit to Supine 4  Minimal assistance   Transfers   Sit to Stand 4  Minimal assistance   Stand to Sit 4  Minimal assistance   Stand pivot 4  Minimal assistance   Additional items   (with walker)   Ambulation/Elevation   Gait pattern   (flexed posture)   Gait Assistance 4  Minimal assist   Assistive Device Rolling walker   Distance 30 feet with change in direction   Balance   Static Sitting Fair +   Static Standing Fair   Activity Tolerance   Activity Tolerance Patient limited by fatigue   Assessment   Prognosis Good   Problem List Decreased strength;Decreased endurance; Impaired balance;Decreased mobility   Assessment Pt is 3 hours s/p MARY   RN OK'd PT treatment  Pt remains generally weak as pt needs min assist to ambulate x 30 feet with a walker with c/o significant fatigue  Pt was incontinent of his bowel upon transferring OOB  Pt cleaned then pt asked to return to bed after walking  Pt too fatigued for additional activity  Recommend STR in light of recent fall reported by pt, 2 recent hospitalizations and pt's current status of requiring at least min assist for mobility  The patient's AM-PAC Basic Mobility Inpatient Short Form Raw Score is 15  A Raw score of less than or equal to 16 suggests the patient may benefit from discharge to post-acute rehabilitation services  Plan   Treatment/Interventions Functional transfer training;LE strengthening/ROM; Therapeutic exercise;Gait training; Bed mobility; Equipment eval/education; Endurance training   Progress Progressing toward goals   PT Frequency Other (Comment)  (5x/w)   Recommendation   PT Discharge Recommendation Post acute rehabilitation services   Equipment Recommended   (pt has a walker and a wheelchair)   [address]   Turning in Bed Without Bedrails 3   Lying on Back to Sitting on Edge of Flat Bed 2   Moving Bed to Chair 3   Standing Up From Chair 3   Walk in Room 3   Climb 3-5 Stairs 1   Basic Mobility Inpatient Raw Score 15   Basic Mobility Standardized Score 36 97   Highest Level Of Mobility   -HLM Goal 4: Move to chair/commode   JH-HLM Achieved 6: Walk 10 steps or more   End of Consult   Patient Position at End of Consult All needs within reach; Supine;Bed/Chair alarm activated   [employer] License Number  Terry Hunter [id number]

## 2022-12-07 NOTE — PLAN OF CARE
Problem: RESPIRATORY - ADULT  Goal: Achieves optimal ventilation and oxygenation  Description: INTERVENTIONS:  - Assess for changes in respiratory status  - Assess for changes in mentation and behavior  - Position to facilitate oxygenation and minimize respiratory effort  - Oxygen administered by appropriate delivery if ordered  - Initiate smoking cessation education as indicated  - Encourage broncho-pulmonary hygiene including cough, deep breathe, Incentive Spirometry  - Assess the need for suctioning and aspirate as needed  - Assess and instruct to report SOB or any respiratory difficulty  - Respiratory Therapy support as indicated  12/6/2022 1925 by Danny Zazueta RN  Outcome: Progressing  12/6/2022 1921 by Danny Zazueta RN  Outcome: Progressing     Problem: MOBILITY - ADULT  Goal: Maintain or return to baseline ADL function  Description: INTERVENTIONS:  -  Assess patient's ability to carry out ADLs; assess patient's baseline for ADL function and identify physical deficits which impact ability to perform ADLs (bathing, care of mouth/teeth, toileting, grooming, dressing, etc )  - Assess/evaluate cause of self-care deficits   - Assess range of motion  - Assess patient's mobility; develop plan if impaired  - Assess patient's need for assistive devices and provide as appropriate  - Encourage maximum independence but intervene and supervise when necessary  - Involve family in performance of ADLs  - Assess for home care needs following discharge   - Consider OT consult to assist with ADL evaluation and planning for discharge  - Provide patient education as appropriate  12/6/2022 1925 by Danny Zazueta RN  Outcome: Progressing  12/6/2022 1921 by Danny Zazueta RN  Outcome: Progressing

## 2022-12-07 NOTE — ANESTHESIA POSTPROCEDURE EVALUATION
"1230    wife Summer Del Real at [phone] and [phone]; [facility] Post-Op Assessment Note    CV Status:  Stable  Pain Score: 0    Pain management: adequate     Mental Status:  Alert and awake   Hydration Status:  Euvolemic   PONV Controlled:  Controlled   Airway Patency:  Patent      Post Op Vitals Reviewed: Yes      Staff: CRNA         No notable events documented      BP   118/56   Temp      Pulse  69   Resp   18   SpO2   100% "

## 2022-12-07 NOTE — TELEPHONE ENCOUNTER
LM for pt to inform him we are canceling his 12/8 f/u with Dr Layne Galarza due to hm being in the hospital

## 2022-12-07 NOTE — ANESTHESIA PREPROCEDURE EVALUATION
Procedure:  MARY    Relevant Problems   CARDIO   (+) Aneurysm of abdominal aorta   (+) CAD (coronary artery disease)   (+) Chronic a-fib (HCC)   (+) Coronary artery arteriosclerosis   (+) Dyspnea on exertion   (+) Essential hypertension   (+) Hyperlipidemia   (+) Moderate to severe aortic stenosis      GI/HEPATIC   (+) Dysphagia, pharyngoesophageal phase   (+) Gastro-esophageal reflux disease without esophagitis      /RENAL   (+) Benign hypertension with chronic kidney disease, stage III (HCC)   (+) Benign prostatic hyperplasia   (+) Kidney stone on left side   (+) Nephrolithiasis   (+) Stage 3a chronic kidney disease (HCC)      HEMATOLOGY   (+) Anemia   (+) Iron deficiency anemia   (+) Symptomatic anemia      MUSCULOSKELETAL   (+) Cervical strain   (+) Chronic gout without tophus   (+) Fibromyalgia   (+) Primary osteoarthritis of both knees      NEURO/PSYCH   (+) Chronic pain disorder   (+) Chronic pain syndrome   (+) Depression   (+) Depression with anxiety   (+) Fibromyalgia   (+) JOO (generalized anxiety disorder)   (+) History of falling   (+) Major depressive disorder, recurrent, unspecified (HCC)   (+) Moderate or severe vision impairment, one eye      PULMONARY   (+) COPD (chronic obstructive pulmonary disease) ([facility])   (+) Centrilobular emphysema (HCC)   (+) Dyspnea on exertion   (+) Mixed simple and mucopurulent chronic bronchitis (HCC)   (+) JOVI (obstructive sleep apnea)   (+) Smoker        Physical Exam    Airway    Mallampati score: II  TM Distance: >3 FB  Neck ROM: full     Dental   No notable dental hx     Cardiovascular  Cardiovascular exam normal    Pulmonary  Pulmonary exam normal     Other Findings        Anesthesia Plan  ASA Score- 3     Anesthesia Type- IV sedation with anesthesia with ASA Monitors  Additional Monitors:   Airway Plan:           Plan Factors-Exercise tolerance (METS): <4 METS  Chart reviewed  EKG reviewed  Imaging results reviewed  Existing labs reviewed   Patient summary reviewed  Patient is not a current smoker  Induction-     Postoperative Plan-     Informed Consent- Anesthetic plan and risks discussed with patient  I personally reviewed this patient with the CRNA  Discussed and agreed on the Anesthesia Plan with the CRNA  Dariana Nino

## 2022-12-07 NOTE — PROGRESS NOTES
Progress Note - Infectious Disease   Arabella Marquez 80 y o  male MRN: [id number]  Unit/Bed#: 2669 [address] Encounter: [id number]      Impression/Plan:    1  Staph aureus bacteremia  POA, 2/2 admission blood cultures growing staph aureus, identified as MSSA on BCID  Unclear source, may be skin translocation as the patient has multiple scabs on his arms and legs  This is complicated by the presence of a PPM  TTE with AV and MV thickening, no clear vegetations  MARY with no obvious large vegetations, but given significant calcification cannot rule out small lesion  CT chest with numerous pulmonary nodules, could be septic emboli vs less likely metastatic disease  This does raise concern for a primary endovascular source of infection  Discussed treatment options with the family and Cardiology  PPM removal is recommended in the setting of staph bacteremia regardless of the primary source, however the pacemaker has been in place 12 years and removal would be an extensive surgical procedure  Given his comorbidities, there are risks to surgery  His family would like to pursue a more conservative approach with antibiotics before any surgery    -continue IV Cefazolin 2g q8hr              -follow up repeat blood cultures   -anticipate a 6 week course of antibiotics from blood culture clearance, but if we cannot clear cultures the patient will require pacemaker extraction   -will need surveillance blood cultures after completion of antibiotic course     2  Weakness, encephalopathy  Likely toxic metabolic due to infection as above  CTH showed a hypodensity in the right parieto-occipital region most likely a chronic infarct or metastatic lesion  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  Neurology has low concern for an acute stroke               -treatment of infection as above              -neurology follow up              -monitor mental status     3  Leukocytosis  Likely due to #1 above   Patient is afebrile, hemodynamically stable               -antibiotics as above              -monitor WBC count, fever curve     4  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  Complicated by staph aureus bacteremia  MARY no clear vegetations    -management as above     5  Pulmonary nodules and adenopathy  CT chest shows numerous new pulmonary nodules and mediastinal and hilar lymphadenopathy, concerning for metastases however in setting of staph aureus bacteremia could be septic emboli  Patient had a colonoscopy last month without abnormalities  He is a long term smoker  -pulmonary consulted   -will need serial imaging after 4-6 weeks of antibiotics; if persistent will require work up for malignancy     6  CKD  Creatinine stable at baseline  Antibiotics dose adjusted as needed      I have discussed the above management plan in detail with Dr Florecita Cruz and Dr Ivelisse Lott of Cardiology  Discussed with the patient and wife at bedside, and the patient's daughter over the phone  ID will follow  Antibiotics:  Cefazolin day 2    Subjective: The patient is feeling okay, underwent MARY without significant vegetation  He denies fever, shortness of breath, nausea, diarrhea  Objective:  Vitals:  Temp:  [97 °F (36 1 °C)-98 °F (36 7 °C)] 97 °F (36 1 °C)  HR:  [69-79] 79  Resp:  [17-22] 20  BP: ()/(50-61) 105/52  SpO2:  [95 %-100 %] 96 %  Temp (24hrs), Av 5 °F (36 4 °C), Min:97 °F (36 1 °C), Max:98 °F (36 7 °C)  Current: Temperature: (!) 97 °F (36 1 °C)    Physical Exam:   General Appearance:  He is non toxic appearing but chronically ill    Throat: Oropharynx moist without lesions  Lungs:   Clear to auscultation bilaterally; no wheezes, rhonchi or rales; respirations unlabored   Heart:  RRR; no murmur, rub or gallop   Abdomen:   Soft, non-tender, non-distended, positive bowel sounds  Extremities: No clubbing, cyanosis or edema   Skin: Multiple ecchymoses, scabs on his arms and left knee       Labs:    All pertinent labs and imaging studies were personally reviewed  Results from last 7 days   Lab Units 12/07/22  0516 12/06/22  0834 12/06/22  0104   WBC Thousand/uL 9 45 12 55* 14 80*   HEMOGLOBIN g/dL 7 5* 8 6* 8 1*   PLATELETS Thousands/uL 125* 144* 158     Results from last 7 days   Lab Units 12/07/22  0516 12/06/22  0834 12/05/22  1919   SODIUM mmol/L 130* 133* 135   POTASSIUM mmol/L 3 2* 3 6 3 7   CHLORIDE mmol/L 95* 96 96   CO2 mmol/L 28 29 29   BUN mg/dL 27* 29* 32*   CREATININE mg/dL 1 38* 1 58* 1 65*   EGFR ml/min/1 73sq m 47 40 38   CALCIUM mg/dL 8 2* 8 5 8 6   AST U/L  --   --  33   ALT U/L  --   --  23   ALK PHOS U/L  --   --  85     Results from last 7 days   Lab Units 12/07/22  0516 12/06/22  0104   PROCALCITONIN ng/ml 1 80* 1 79*                   Micro:  Results from last 7 days   Lab Units 12/07/22  0549 12/06/22  2209 12/05/22  1948   BLOOD CULTURE  Received in Microbiology Lab  Culture in Progress  Received in Microbiology Lab  Culture in Progress  --  Staphylococcus aureus*  Staphylococcus aureus*   GRAM STAIN RESULT   --   --  Gram positive cocci in clusters*  Gram positive cocci in clusters*   LEGIONELLA URINARY ANTIGEN   --  Negative  --        Imaging:  I have personally reviewed pertinent imaging study reports and images in PACS      CT chest: numerous new pulmonary nodules and mediastinal and hilar lymphadenopathy, concerning for metastases     Other Studies:   I have personally reviewed pertinent reports

## 2022-12-07 NOTE — CASE MANAGEMENT
Case Management Discharge Planning Note    Patient name Lillie Henley  Location [facility] 68 219/2 Mason PENA MRN [id number]  : 1940 Date 2022       Current Admission Date: 2022  Current Admission Diagnosis:Sepsis [facility])   Patient Active Problem List    Diagnosis Date Noted   • Anemia 2022   • Depression with anxiety 2022   • Sepsis ([facility] ) 2022   • Acute encephalopathy 2022   • Metastatic disease ([facility] ) 2022   • Elevated troponin 2022   • Left hip pain 2022   • Cervical strain 2022   • Lump of skin of left lower extremity 2022   • Primary osteoarthritis of both knees 2022   • Depression 2022   • Generalized weakness 2022   • Valvular heart disease 2022   • Pressure injury of right buttock, unstageable ([place] [address] ) 2022   • Ambulatory dysfunction 2022   • Dysphagia, pharyngoesophageal phase 2022   • Gastro-esophageal reflux disease without esophagitis 2022   • Generalized muscle weakness 2022   • History of falling 2022   • Iron deficiency anemia 2022   • Major depressive disorder, recurrent, unspecified ([place] [address] ) 2022   • Moderate to severe aortic stenosis 2022   • Other abnormalities of gait and mobility 2022   • Other specified polyneuropathies 2022   • Pulmonary hypertension due to left heart disease ([place] [address] ) 2022   • Presence of coronary angioplasty implant and graft 2022   • Unspecified hearing loss, unspecified ear 2022   • Chronic pain disorder 2022   • Fall 2022   • Closed fracture of right femur ([facility] ) 2022   • COPD (chronic obstructive pulmonary disease) ([facility] ) 2021   • Chronic diastolic heart failure ([place] [address] ) 2021   • Memory difficulty 2021   • Neuropathy 2021   • Coronary artery arteriosclerosis 2021   • Essential hypertension 2021   • Dyspnea on exertion    • Idiopathic hematuria 2021 • Kidney stone on left side 04/01/2021   • Flank pain 04/01/2021   • Obesity (BMI 30-39 9) 04/01/2021   • Chronic constipation 02/16/2021   • Vitamin D insufficiency 11/13/2020   • Symptomatic anemia 09/13/2020   • Former smoker 01/13/2020   • Nephrolithiasis 10/30/2019   • Bilateral leg edema 09/26/2019   • Hyperuricemia 07/22/2019   • Mixed simple and mucopurulent chronic bronchitis ([facility] ) 07/17/2019   • CAD (coronary artery disease) 05/13/2019   • Status post primary angioplasty with coronary stent 05/13/2019   • Stage 3a chronic kidney disease ([facility]  ) 04/18/2019   • Serum total bilirubin elevated 04/08/2019   • Peripheral arteriosclerosis ([place] [address] ) 01/03/2019   • Pain in both lower extremities 01/31/2018   • Benign hypertension with chronic kidney disease, stage III ([facility]  ) 06/27/2017   • JOO (generalized anxiety disorder) 06/07/2016   • Chronic pain syndrome 03/17/2016   • Bilateral lumbar radiculopathy 03/17/2016   • Lumbar canal stenosis 03/17/2016   • Difficulty in walking 09/11/2015   • Persistent proteinuria 01/08/2015   • Urinary incontinence 10/30/2014   • Aneurysm of abdominal aorta 04/07/2014   • Centrilobular emphysema ([facility] ) 04/07/2014   • Deep venous insufficiency 04/07/2014   • Hyperlipidemia 04/07/2014   • Pacemaker 04/07/2014   • Fibromyalgia 08/08/2013   • 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/2013   • Fecal soiling 03/26/2013   • Class 2 obesity due to excess calories without serious comorbidity with body mass index (BMI) of 36 0 to 36 9 in adult 03/26/2013   • Chronic a-fib ([facility] ) 01/23/2013   • Allergic rhinitis 10/01/2012   • Benign prostatic hyperplasia 09/04/2012   • Carpal tunnel syndrome 09/04/2012   • Moderate or severe vision impairment, one eye 09/04/2012   • Smoker 09/04/2012   • JOVI (obstructive sleep apnea) 09/04/2012   • Venous insufficiency (chronic) (peripheral) 09/04/2012      LOS (days): 1  Geometric Mean LOS (GMLOS) (days): 4 60  Days to GMLOS:3 7     OBJECTIVE:  Risk of Unplanned Readmission Score: 31 17      Current admission status: Inpatient   Preferred Pharmacy:   [facility] Wilson Zu[address]iga, [address],  [address] [address]  Phone: [phone] Fax: [phone]    [facility] [address] [facility], [address] [place] [address] [facility] [address]  [address] [facility]  Phone: [phone] Fax: [place], [address]  [address] [facility] [address] [facility]  Phone: [phone] Fax: [phone]    [facility] [address] [place] [address]  Phone: [phone] Fax: [phone]    Primary Care Provider: Arlet Zaragoza DO    Primary Insurance: MEDICARE  Secondary Insurance: COMMERCIAL MISCELLANEOUS    DISCHARGE DETAILS:    Discharge planning discussed with[de-identified] Patient, Spouse Manjuvanessa Cantujoshua  [place] of Choice: Yes  Comments - Freedom of Choice: SW met bedside with patient, spouse, and dtr to provide accepting STR list for their review  Dtr confirmed they will discuss with her sister and call SW with their facility choice  SW advised [facility] agency of choice [facility] is also able to accept if pt were to go home with services instead  Family gave verbal understanding and had no further questions at this time  SW provided family with direct contact information  CM contacted family/caregiver?: Yes  Were Treatment Team discharge recommendations reviewed with patient/caregiver?: Yes  Did patient/caregiver verbalize understanding of patient care needs?: Yes  Were patient/caregiver advised of the risks associated with not following Treatment Team discharge recommendations?: Yes    Contacts  Patient Contacts: [place] (wife)/ [place] (dtr)  Relationship to Patient[de-identified] Family  Contact Method:  In Person  Reason/Outcome: Emergency Contact, Discharge Planning    Treatment Team Recommendation: Short Term Rehab  Discharge Destination Plan[de-identified] Short Term Rehab, Home with [facility] (pending progress)

## 2022-12-08 DIAGNOSIS — M54.16 RADICULOPATHY OF LUMBAR REGION: ICD-10-CM

## 2022-12-08 PROBLEM — B95.61 MSSA BACTEREMIA: Status: ACTIVE | Noted: 2022-12-06

## 2022-12-08 PROBLEM — R78.81 MSSA BACTEREMIA: Status: ACTIVE | Noted: 2022-01-01

## 2022-12-08 LAB
ABO GROUP BLD: NORMAL
ANION GAP SERPL CALCULATED.3IONS-SCNC: 9 MMOL/L (ref 4–13)
BACTERIA BLD CULT: ABNORMAL
BACTERIA BLD CULT: ABNORMAL
BASOPHILS # BLD AUTO: 0.01 THOUSANDS/ÂΜL (ref 0–0.1)
BASOPHILS NFR BLD AUTO: 0 % (ref 0–1)
BLD GP AB SCN SERPL QL: NEGATIVE
BUN SERPL-MCNC: 36 MG/DL (ref 5–25)
CALCIUM SERPL-MCNC: 7.9 MG/DL (ref 8.3–10.1)
CHLORIDE SERPL-SCNC: 97 MMOL/L (ref 96–108)
CO2 SERPL-SCNC: 25 MMOL/L (ref 21–32)
CREAT SERPL-MCNC: 1.61 MG/DL (ref 0.6–1.3)
EOSINOPHIL # BLD AUTO: 0.02 THOUSAND/ÂΜL (ref 0–0.61)
EOSINOPHIL NFR BLD AUTO: 0 % (ref 0–6)
ERYTHROCYTE [DISTWIDTH] IN BLOOD BY AUTOMATED COUNT: 20.8 % (ref 11.6–15.1)
GFR SERPL CREATININE-BSD FRML MDRD: 39 ML/MIN/1.73SQ M
GLUCOSE SERPL-MCNC: 177 MG/DL (ref 65–140)
GRAM STN SPEC: ABNORMAL
GRAM STN SPEC: ABNORMAL
HCT VFR BLD AUTO: 23.7 % (ref 36.5–49.3)
HEMOCCULT STL QL: NEGATIVE
HEMOCCULT STL QL: NORMAL
HEMOCCULT STL QL: NORMAL
HGB BLD-MCNC: 7.3 G/DL (ref 12–17)
IMM GRANULOCYTES # BLD AUTO: 0.12 THOUSAND/UL (ref 0–0.2)
IMM GRANULOCYTES NFR BLD AUTO: 1 % (ref 0–2)
LYMPHOCYTES # BLD AUTO: 0.8 THOUSANDS/ÂΜL (ref 0.6–4.47)
LYMPHOCYTES NFR BLD AUTO: 7 % (ref 14–44)
MCH RBC QN AUTO: 26.5 PG (ref 26.8–34.3)
MCHC RBC AUTO-ENTMCNC: 30.8 G/DL (ref 31.4–37.4)
MCV RBC AUTO: 86 FL (ref 82–98)
MONOCYTES # BLD AUTO: 1.14 THOUSAND/ÂΜL (ref 0.17–1.22)
MONOCYTES NFR BLD AUTO: 10 % (ref 4–12)
NEUTROPHILS # BLD AUTO: 9.49 THOUSANDS/ÂΜL (ref 1.85–7.62)
NEUTS SEG NFR BLD AUTO: 82 % (ref 43–75)
NRBC BLD AUTO-RTO: 0 /100 WBCS
PLATELET # BLD AUTO: 128 THOUSANDS/UL (ref 149–390)
PMV BLD AUTO: 10.3 FL (ref 8.9–12.7)
POTASSIUM SERPL-SCNC: 3.8 MMOL/L (ref 3.5–5.3)
RBC # BLD AUTO: 2.75 MILLION/UL (ref 3.88–5.62)
RH BLD: POSITIVE
S AUREUS DNA BLD POS QL NAA+NON-PROBE: DETECTED
SODIUM SERPL-SCNC: 131 MMOL/L (ref 135–147)
SPECIMEN EXPIRATION DATE: NORMAL
WBC # BLD AUTO: 11.58 THOUSAND/UL (ref 4.31–10.16)

## 2022-12-08 RX ORDER — FUROSEMIDE 10 MG/ML
40 INJECTION INTRAMUSCULAR; INTRAVENOUS ONCE
Status: COMPLETED | OUTPATIENT
Start: 2022-12-08 | End: 2022-12-08

## 2022-12-08 RX ORDER — GABAPENTIN 100 MG/1
CAPSULE ORAL
Qty: 30 CAPSULE | Refills: 1 | Status: SHIPPED | OUTPATIENT
Start: 2022-12-08 | End: 2022-12-09

## 2022-12-08 RX ADMIN — FINASTERIDE 5 MG: 5 TABLET, FILM COATED ORAL at 08:08

## 2022-12-08 RX ADMIN — DOCUSATE SODIUM 100 MG: 100 CAPSULE, LIQUID FILLED ORAL at 21:59

## 2022-12-08 RX ADMIN — ISOSORBIDE MONONITRATE 30 MG: 30 TABLET, EXTENDED RELEASE ORAL at 08:08

## 2022-12-08 RX ADMIN — FUROSEMIDE 40 MG: 10 INJECTION, SOLUTION INTRAMUSCULAR; INTRAVENOUS at 13:52

## 2022-12-08 RX ADMIN — ALLOPURINOL 200 MG: 100 TABLET ORAL at 08:08

## 2022-12-08 RX ADMIN — PANTOPRAZOLE SODIUM 40 MG: 40 TABLET, DELAYED RELEASE ORAL at 06:09

## 2022-12-08 RX ADMIN — CEFAZOLIN SODIUM 2000 MG: 2 SOLUTION INTRAVENOUS at 21:59

## 2022-12-08 RX ADMIN — DULOXETINE HYDROCHLORIDE 60 MG: 60 CAPSULE, DELAYED RELEASE ORAL at 08:08

## 2022-12-08 RX ADMIN — Medication 250 MG: at 08:08

## 2022-12-08 RX ADMIN — DOCUSATE SODIUM 100 MG: 100 CAPSULE, LIQUID FILLED ORAL at 08:07

## 2022-12-08 RX ADMIN — CEFAZOLIN SODIUM 2000 MG: 2 SOLUTION INTRAVENOUS at 13:06

## 2022-12-08 RX ADMIN — DULOXETINE HYDROCHLORIDE 60 MG: 60 CAPSULE, DELAYED RELEASE ORAL at 17:04

## 2022-12-08 RX ADMIN — GABAPENTIN 100 MG: 100 CAPSULE ORAL at 21:59

## 2022-12-08 RX ADMIN — ONDANSETRON 4 MG: 2 INJECTION INTRAMUSCULAR; INTRAVENOUS at 23:11

## 2022-12-08 RX ADMIN — Medication 250 MG: at 17:04

## 2022-12-08 RX ADMIN — ACETAMINOPHEN 650 MG: 325 TABLET, FILM COATED ORAL at 15:31

## 2022-12-08 RX ADMIN — FLUTICASONE FUROATE AND VILANTEROL TRIFENATATE 1 PUFF: 200; 25 POWDER RESPIRATORY (INHALATION) at 08:13

## 2022-12-08 RX ADMIN — RIVAROXABAN 15 MG: 15 TABLET, FILM COATED ORAL at 08:08

## 2022-12-08 RX ADMIN — ATORVASTATIN CALCIUM 40 MG: 40 TABLET, FILM COATED ORAL at 15:31

## 2022-12-08 RX ADMIN — UMECLIDINIUM 1 PUFF: 62.5 AEROSOL, POWDER ORAL at 08:13

## 2022-12-08 RX ADMIN — POLYETHYLENE GLYCOL 3350 17 G: 17 POWDER, FOR SOLUTION ORAL at 08:07

## 2022-12-08 RX ADMIN — TORSEMIDE 40 MG: 20 TABLET ORAL at 08:08

## 2022-12-08 RX ADMIN — CEFAZOLIN SODIUM 2000 MG: 2 SOLUTION INTRAVENOUS at 06:08

## 2022-12-08 NOTE — DISCHARGE INSTR - DIET
Recommended Diet: soft/level 3 diet and thin liquids   Recommended Form of Meds: as best tolerated  Aspiration precautions: slow rate of feeding and single sips  Other Recommendations: Continue frequent oral care

## 2022-12-08 NOTE — SPEECH THERAPY NOTE
Speech Language/Pathology    Speech/Language Pathology Progress Note    Patient Name: Valentine Holloway  [id number] Date: 12/8/2022     Problem List  Principal Problem:    Sepsis ([facility] )  Active Problems:    Benign hypertension with chronic kidney disease, stage III (HCC)    Benign prostatic hyperplasia    Chronic a-fib (HCC)    Hyperlipidemia    JOVI (obstructive sleep apnea)    Stage 3a chronic kidney disease (HCC)    CAD (coronary artery disease)    MSSA bacteremia    Obesity (BMI 30-39  9)    COPD (chronic obstructive pulmonary disease) (HCC)    Chronic diastolic heart failure (HCC)    Gastro-esophageal reflux disease without esophagitis    Moderate to severe aortic stenosis    Elevated troponin    Anemia    Depression with anxiety    Acute encephalopathy    Abnormal CAT scan       Past Medical History  Past Medical History:   Diagnosis Date   • Arthritis    • Atrial flutter ([facility])    • Chronic kidney disease     stage 3   • Coronary artery disease     2 stents   • Fluid retention    • Gout    • Heart disease    • Heart failure ([facility])     pacemaker   • Hypertension    • Hyponatremia 04/08/2019   • Neurological disorder    • Pacemaker    • Pulmonary emphysema ([facility] )    • Radiculopathy     last assessed 1/28/16    • Shortness of breath     exertion   • Sleep apnea     c pap        Past Surgical History  Past Surgical History:   Procedure Laterality Date   • ANGIOPLASTY      x2 2 stents and then replaced   • CARDIAC PACEMAKER PLACEMENT      pacemaker permanent placement dual chamber / last assessed 4/7/14 / implantation    • CARDIAC SURGERY      pacemaker   • CHOLECYSTECTOMY     • CORONARY ANGIOPLASTY WITH STENT PLACEMENT     • EPIDURAL BLOCK INJECTION N/A 05/26/2016    Procedure: BLOCK / INJECTION EPIDURAL STEROID LUMBAR  L4-5;  Surgeon: Poonam Schmidt MD;  Location: [facility] MAIN OR;  Service:    • EPIDURAL BLOCK INJECTION N/A 02/14/2019    Procedure: L4 L5 Lumbar Epidural Steroid Injection;  Surgeon: Poonam Schmidt MD; Location: [place] [address] MAIN OR;  Service: Pain Management    • EYE SURGERY      cataract left   • HAND SURGERY     • HIP PINNING Left    • INSERT / REPLACE / REMOVE PACEMAKER     • KNEE ARTHROSCOPY W/ MENISCAL REPAIR Left    • KNEE SURGERY     • LUMBAR EPIDURAL INJECTION N/A 03/17/2016    Procedure: BLOCK / INJECTION LUMBAR  L4-5  (C-ARM); Surgeon: Jessica Lee MD;  Location: [facility] MAIN OR;  Service:    • SC OPEN RX FEMUR FX+INTRAMED ELLA Right 01/31/2022    Procedure: INSERTION NAIL IM FEMUR ANTEGRADE (TROCHANTERIC); Surgeon: Tee Mccoy MD;  Location: [facility] Main OR;  Service: Orthopedics       Subjective:  Pt easily awoke to gentle verbal stimuli  Pt very pleasant and reported that "I am better sprits today and feeling better (pointed to chest) "     Objective:    Pt received a formal bedside swallow evaluation 12/6  The purpose of today's therapy session is to ensure tolerance of current diet (Soft/level3) and thin liquids, pt should be using small single sips of liquids to eliminate overt s/s of penetration/aspiration  Assessment:  EMR reviewed  Pt denied difficulty with PO intake and that he is "trying to learn to take sips" when drinking thin liquids  Pt did not want to "eat lot" because he wants to wait for lunch, but he was agreeable to take 2, 4 oz of juice/thin liquids and a fig kim bar  Pt able to self fed and drink using small bites/single sips of juice  Oral phase:  Fig kim- adequate mastication, bolus formation and a-p transfer without significant oral residuals  Thin liquids- adequate labial seal, bolus formation and a-p transport,     Pharyngeal phase: swallow initiation appeared timely, with suspectedadequate hyoid laryngeal elevation and excursion  No overt distress or change from baseline phono-respiratory status  Pt presented with spontaneous cough while talking with this clinician post PO and suspect not related to PO intake   Pt reported that sometimes the food takes a while to go down (pointed to esophageal region) and needs a liquid wash  Otherwise, pt denied dysphagia  Plan/Recommendations:  Discussed with pt as well as rn, Romeo Avalos, current diet is being tolerated well and recommended to be continued  Pt is appropriate for discharge from caseload given tolerance to PO as well as implementation of small single sips  Recommended Diet: soft/level 3 diet and thin liquids   Recommended Form of Meds: as best tolerated  Aspiration precautions: slow rate of feeding and single sips  Other Recommendations: Continue frequent oral care       Twan Landis, [address] [place] [address]  Speech Pathology [place] [id number]

## 2022-12-08 NOTE — ASSESSMENT & PLAN NOTE
Mild troponin elevation most likely non MI troponin elevation secondary to sepsis  · EKG showed paced rhythm  · Patient also complained of some chest pain and repeat troponin remains around 700  · Repeat EKG showed some inferolateral T wave inversion  · Likely secondary symptomatic anemia-patient was given 1 unit of PBC transfusion

## 2022-12-08 NOTE — ASSESSMENT & PLAN NOTE
Wt Readings from Last 3 Encounters:   12/07/22 120 kg (265 lb)   11/22/22 121 kg (265 lb 10 5 oz)   10/20/22 120 kg (265 lb)     On Demadex 40 mg p o  Daily at home  · 2D echo in January 2022 showed normal EF, 65%, moderate concentric hypertrophy, dilated atriums, severe AS, mild to moderate AR, moderate MS  · Chest X-ray showed cardiomegaly with moderate congestive changes  · CT chest showed numerous new pulmonary nodules with mediastinal and hilar lymphadenopathy small right pleural effusion  · 1-2+ lower leg edema on exam, albumin 2 6  · BNP was elevated at 14,000  Demadex have been on hold in the setting of sepsis  Patient was given a dose of Lasix 40 mg IV today with blood transfusion

## 2022-12-08 NOTE — ASSESSMENT & PLAN NOTE
Baseline hemoglobin appears to be around 7-9s  · Hemoglobin 9 1, repeat 8 1 in ED  No evidence of active bleeding  · Globin continues to drop and is down to 7 3  · Patient was given 1 unit of PBC transfusion  · History of symptomatic anemia during prior hospitalization  Patient had EGD which showed no sign of upper GI bleeding with mild gastritis and Candida esophagitis  Colonoscopy also showed no evidence of bleeding  Patient was recommended to follow-up outpatient with GI for capsule endoscopy    · Repeat stool occult blood was negative

## 2022-12-08 NOTE — PHYSICAL THERAPY NOTE
PT Cancellation Note       12/08/22 1446   PT Last Visit   PT Visit Date 12/08/22   Note Type   Note Type Cancelled Session   Cancel Reasons Medical status  (Pt currently receiving blood transfusion - will follow-up as schedule allows )   Licensure   [place] License Number  Danika Cooper [id number]

## 2022-12-08 NOTE — ASSESSMENT & PLAN NOTE
Patient presents with somnolence for 24 hours and generalized weakness  Patient reports a little cough and runny nose, denies sore throat/SOB/fever/chills  · Patient hospitalized in November 2022 for symptomatic anemia, hemoglobin 7 1 on admission with positive stool guaiac, received 1 unit of PRBC, underwent EGD and colonoscopy both showed no evidence of bleeding, patient was recommended outpatient capsule endoscopy which is still pending in epic  Plavix was discontinued on discharge, patient remained on Xarelto  · WBC 15 38, repeat 14 80, no bands, patient afebrile  · Lactic acid normal   · Procalcitonin 1 79  · Chest x-ray showed no evidence of pneumonia  · UA no evidence of infection   · COVID-19, flu RSV negative  · CT chest showed -  Numerous new pulmonary nodules, concerning for metastatic disease;Mediastinal and hilar lymphadenopathy, also concerning for metastases; Small right pleural effusion with adjacent basal opacity, most likely represents atelectasis  · Patient was given Zosyn in the ED and was later de-escalated to Rocephin  Later antibiotics were changed to Ancef 2 g every 8 hours  · Blood cultures 2 out of 2 growing Methicillin sensitive Staphylococcus aureus  Bacterial identification panel showing MSSA  · ID input appreciated  · Echocardiogram showed EF of 60% with severe concentric hypertrophy, EF of 60% without any visible vegetations with severe attic stenosis  · Patient underwent MARY which showed EF of 55% without any large vegetation but cannot rule out small vegetation due to calcium and acoustic shadowing  · Discussed in detail with ID, family and cardiology on 12/8/22  · Unclear source-possible from skin translocation as patient noted to have multiple scabs  · CAT scan showing multiple lung nodules-questionable septic emboli  · Pacemaker removal was recommended by ID-maya with cardiology    Pacemaker has been in place for 12 years and removal would be an extensive surgical procedure  Given multiple comorbidities cardiology recommending conservative approach and treatment with IV antibiotics with repeat blood cultures  · Repeat blood cultures from 12/7/2022 with no growth  · Anticipate course of 6 weeks of antibiotics from the date of blood culture clearance

## 2022-12-08 NOTE — CASE MANAGEMENT
Case Management Discharge Planning Note    Patient name Owen Hendrix  Location 2 [place] [address] PENA MRN [id number]  : 1940 Date 2022       Current Admission Date: 2022  Current Admission Diagnosis:Sepsis [facility])   Patient Active Problem List    Diagnosis Date Noted   • Anemia 2022   • Depression with anxiety 2022   • Sepsis ([facility] ) 2022   • Acute encephalopathy 2022   • Abnormal CAT scan 2022   • Elevated troponin 2022   • Left hip pain 2022   • Cervical strain 2022   • Lump of skin of left lower extremity 2022   • Primary osteoarthritis of both knees 2022   • Depression 2022   • Generalized weakness 2022   • Valvular heart disease 2022   • Pressure injury of right buttock, unstageable ([facility] ) 2022   • Ambulatory dysfunction 2022   • Dysphagia, pharyngoesophageal phase 2022   • Gastro-esophageal reflux disease without esophagitis 2022   • Generalized muscle weakness 2022   • History of falling 2022   • Iron deficiency anemia 2022   • Major depressive disorder, recurrent, unspecified ([facility] ) 2022   • Moderate to severe aortic stenosis 2022   • Other abnormalities of gait and mobility 2022   • Other specified polyneuropathies 2022   • Pulmonary hypertension due to left heart disease ([facility] ) 2022   • Presence of coronary angioplasty implant and graft 2022   • Unspecified hearing loss, unspecified ear 2022   • Chronic pain disorder 2022   • Fall 2022   • Closed fracture of right femur ([facility] ) 2022   • COPD (chronic obstructive pulmonary disease) ([facility] ) 2021   • Chronic diastolic heart failure ([facility] ) 2021   • Memory difficulty 2021   • Neuropathy 2021   • Coronary artery arteriosclerosis 2021   • Essential hypertension 2021   • Dyspnea on exertion    • Idiopathic hematuria 2021   • Kidney stone on left side 04/01/2021   • Flank pain 04/01/2021   • Obesity (BMI 30-39 9) 04/01/2021   • Chronic constipation 02/16/2021   • Vitamin D insufficiency 11/13/2020   • MSSA bacteremia 09/13/2020   • Symptomatic anemia 09/13/2020   • Former smoker 01/13/2020   • Nephrolithiasis 10/30/2019   • Bilateral leg edema 09/26/2019   • Hyperuricemia 07/22/2019   • Mixed simple and mucopurulent chronic bronchitis ([facility]) 07/17/2019   • CAD (coronary artery disease) 05/13/2019   • Status post primary angioplasty with coronary stent 05/13/2019   • Stage 3a chronic kidney disease ([facility]) 04/18/2019   • Serum total bilirubin elevated 04/08/2019   • Peripheral arteriosclerosis ([facility]) 01/03/2019   • Pain in both lower extremities 01/31/2018   • Benign hypertension with chronic kidney disease, stage III ([facility]) 06/27/2017   • JOO (generalized anxiety disorder) 06/07/2016   • Chronic pain syndrome 03/17/2016   • Bilateral lumbar radiculopathy 03/17/2016   • Lumbar canal stenosis 03/17/2016   • Difficulty in walking 09/11/2015   • Persistent proteinuria 01/08/2015   • Urinary incontinence 10/30/2014   • Aneurysm of abdominal aorta 04/07/2014   • Centrilobular emphysema ([facility]) 04/07/2014   • Deep venous insufficiency 04/07/2014   • Hyperlipidemia 04/07/2014   • Pacemaker 04/07/2014   • Fibromyalgia 08/08/2013   • 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/2013   • Fecal soiling 03/26/2013   • Class 2 obesity due to excess calories without serious comorbidity with body mass index (BMI) of 36 0 to 36 9 in adult 03/26/2013   • Chronic a-fib ([facility]) 01/23/2013   • Allergic rhinitis 10/01/2012   • Benign prostatic hyperplasia 09/04/2012   • Carpal tunnel syndrome 09/04/2012   • Moderate or severe vision impairment, one eye 09/04/2012   • Smoker 09/04/2012   • JOVI (obstructive sleep apnea) 09/04/2012   • Venous insufficiency (chronic) (peripheral) 09/04/2012      LOS (days): 2  Geometric Mean LOS (GMLOS) (days): 4 60  Days to GMLOS:2 5 OBJECTIVE:  Risk of Unplanned Readmission Score: 32 15      Current admission status: Inpatient   Preferred Pharmacy:   [facility] [address] [place], [address] [facility] [address] [facility],  [address] [address]  Phone: [phone] Fax: [phone]    [facility] [address] [facility], [address] [facility] [address]  [place] [address] [facility]  Phone: [phone] Fax: [place], [address]  [address] [facility] [address] [facility]  Phone: [phone] Fax: [phone]    [facility] [address] [address]  Phone: [phone] Fax: [phone]    Primary Care Provider: Riaz Lopes DO    Primary Insurance: MEDICARE  Secondary Insurance: COMMERCIAL MISCELLANEOUS    DISCHARGE DETAILS:    Discharge planning discussed with[de-identified] Dtr Duard Koyanagi of Choice: Yes  Comments - Freedom of Choice: SW confirmed with dtr Leighton HUGHES facility of choice is [facility]  CM contacted family/caregiver?: Yes  Were Treatment Team discharge recommendations reviewed with patient/caregiver?: Yes  Did patient/caregiver verbalize understanding of patient care needs?: N/A- going to facility  Were patient/caregiver advised of the risks associated with not following Treatment Team discharge recommendations?: Yes    Contacts  Patient Contacts: Sunil Mendez (dtr)  Relationship to Patient[de-identified] Family  Contact Method: Phone  Phone Number: [phone]  Reason/Outcome: Emergency Contact, Discharge Planning    Other Referral/Resources/Interventions Provided:  Interventions: Short Term Rehab  Referral Comments: THE \[facility]\"" reserved in [place] as chosen by family       Treatment Team Recommendation: Short Term Rehab  Discharge Destination Plan[de-identified] Short Term Rehab

## 2022-12-08 NOTE — ASSESSMENT & PLAN NOTE
Lab Results   Component Value Date    EGFR 39 12/08/2022    EGFR 47 12/07/2022    EGFR 40 12/06/2022    CREATININE 1 61 (H) 12/08/2022    CREATININE 1 38 (H) 12/07/2022    CREATININE 1 58 (H) 12/06/2022     Baseline creatinine appears to be around 1 3-1 6s    · Creatinine stable  · Patient was given a dose of Lasix today

## 2022-12-08 NOTE — PROGRESS NOTES
[place] [address]  Progress Note June Manners 1940, 80 y o  male MRN: [id number]  Unit/Bed#: [address] Encounter: [id number]  Primary Care Provider: Calista Escobar,    Date and time admitted to hospital: 12/5/2022  6:39 PM    * Sepsis [facility])  Assessment & Plan  Patient presents with somnolence for 24 hours and generalized weakness  Patient reports a little cough and runny nose, denies sore throat/SOB/fever/chills  · Patient hospitalized in November 2022 for symptomatic anemia, hemoglobin 7 1 on admission with positive stool guaiac, received 1 unit of PRBC, underwent EGD and colonoscopy both showed no evidence of bleeding, patient was recommended outpatient capsule endoscopy which is still pending in epic  Plavix was discontinued on discharge, patient remained on Xarelto  · WBC 15 38, repeat 14 80, no bands, patient afebrile  · Lactic acid normal   · Procalcitonin 1 79  · Chest x-ray showed no evidence of pneumonia  · UA no evidence of infection   · COVID-19, flu RSV negative  · CT chest showed -  Numerous new pulmonary nodules, concerning for metastatic disease;Mediastinal and hilar lymphadenopathy, also concerning for metastases; Small right pleural effusion with adjacent basal opacity, most likely represents atelectasis  · Patient was given Zosyn in the ED and was later de-escalated to Rocephin  Later antibiotics were changed to Ancef 2 g every 8 hours  · Blood cultures 2 out of 2 growing Methicillin sensitive Staphylococcus aureus  Bacterial identification panel showing MSSA     · ID input appreciated  · Echocardiogram showed EF of 60% with severe concentric hypertrophy, EF of 60% without any visible vegetations with severe attic stenosis  · Patient underwent MARY which showed EF of 55% without any large vegetation but cannot rule out small vegetation due to calcium and acoustic shadowing  · Discussed in detail with ID, family and cardiology on 12/8/22  · Unclear source-possible from skin translocation as patient noted to have multiple scabs  · CAT scan showing multiple lung nodules-questionable septic emboli  · Pacemaker removal was recommended by Marika with cardiology  Pacemaker has been in place for 12 years and removal would be an extensive surgical procedure  Given multiple comorbidities cardiology recommending conservative approach and treatment with IV antibiotics with repeat blood cultures  · Repeat blood cultures from 12/7/2022 with no growth  · Anticipate course of 6 weeks of antibiotics from the date of blood culture clearance  Acute encephalopathy  Assessment & Plan  Likely acute toxic metabolic encephalopathy due to underlying infection, rule out CVA  Patient reports sleepy and feeling weak, oriented to place and person day of week on exam   · CT head showed - Hypodensity in the right parieto-occipital region most likely represents a chronic infarct, however no prior study is available to confirm this  Also differential could be metastatic disease based on the CT of the chest  · No focal neuro deficit on exam   Mental status has improved significantly with treatment of sepsis  · ABG no hypercapnia  · Patient was initially worked up for possible stroke  · Neurology input appreciated  · Continue Xarelto 15 mg p o  daily and Zocor 20 mg p o  daily  · Patient could not get MRI of the brain due to incompatible pacemaker  · Was given full dose aspirin in the ED along with Lipitor 80 mg p o  daily and was continued on Lipitor 40 mg p o  daily  · Hemoglobin A1c was 5 1  ·  neurology recommending repeating CT scan in 1 week  · PT/OT evaluation and treatment    Abnormal CAT scan  Assessment & Plan  CT Chest showed numerous new pulmonary nodules concerning for metastatic disease along with mediastinal and hilar lymphadenopathy and small right pleural effusion  · Patient had CT abdomen p bon without contrast on 11/19/2022 which was unremarkable    · CT of the brain also showing hypodensity in the right parieto-occipital region which might represent chronic infarct/metastasis  · Pulmonary was consulted who is recommending IR guided lung biopsy  · Patient did have a colonoscopy during prior hospitalization which showed no masses  · No history of malignancy as per family  · Bilateral lung nodules could also be related to septic emboli  Patient might need repeat CAT scan in 3 months  If the nodules are improving patient might not need further work-up but if the nodules are persistent might need biopsy      Elevated troponin  Assessment & Plan  Mild troponin elevation most likely non MI troponin elevation secondary to sepsis  · EKG showed paced rhythm  · Patient also complained of some chest pain and repeat troponin remains around 700  · Repeat EKG showed some inferolateral T wave inversion  · Likely secondary symptomatic anemia-patient was given 1 unit of PBC transfusion      Chronic diastolic heart failure (HCC)  Assessment & Plan  Wt Readings from Last 3 Encounters:   12/07/22 120 kg (265 lb)   11/22/22 121 kg (265 lb 10 5 oz)   10/20/22 120 kg (265 lb)     On Demadex 40 mg p o  Daily at home  · 2D echo in January 2022 showed normal EF, 65%, moderate concentric hypertrophy, dilated atriums, severe AS, mild to moderate AR, moderate MS  · Chest X-ray showed cardiomegaly with moderate congestive changes  · CT chest showed numerous new pulmonary nodules with mediastinal and hilar lymphadenopathy small right pleural effusion  · 1-2+ lower leg edema on exam, albumin 2 6  · BNP was elevated at 14,000  Demadex have been on hold in the setting of sepsis  Patient was given a dose of Lasix 40 mg IV today with blood transfusion  Depression with anxiety  Assessment & Plan  Continue Cymbalta    Anemia  Assessment & Plan  Baseline hemoglobin appears to be around 7-9s  · Hemoglobin 9 1, repeat 8 1 in ED  No evidence of active bleeding     · Globin continues to drop and is down to 7 3  · Patient was given 1 unit of PBC transfusion  · History of symptomatic anemia during prior hospitalization  Patient had EGD which showed no sign of upper GI bleeding with mild gastritis and Candida esophagitis  Colonoscopy also showed no evidence of bleeding  Patient was recommended to follow-up outpatient with GI for capsule endoscopy  · Repeat stool occult blood was negative    Moderate to severe aortic stenosis  Assessment & Plan  Severe AS on echo in 1/2022  Underwent MARY which showed mild to moderate concentric hypertrophy with EF of 55% with moderately thickened aortic valve with severe aortic stenosis    Gastro-esophageal reflux disease without esophagitis  Assessment & Plan  Continue PPI    COPD (chronic obstructive pulmonary disease) ([facility])  Assessment & Plan  On Trelegy, albuterol nebulizer p r n  At home  · Was initially on 2 L oxygen but currently stable on room air  · Substitute Trelegy with Breo and Incruse  · Continue albuterol as needed      Obesity (BMI 30-39  9)  Assessment & Plan  Body mass index is 34 02 kg/m²  · Diet and lifestyle modification    MSSA bacteremia  Assessment & Plan  Plan as per above    CAD (coronary artery disease)  Assessment & Plan  Status post PCI with stents x 2  On Imdur, statin at home  · Continue Imdur, statin and Xarelto  · Patient was recommended to stop Plavix during prior admission      Stage 3a chronic kidney disease [facility])  Assessment & Plan  Lab Results   Component Value Date    EGFR 39 12/08/2022    EGFR 47 12/07/2022    EGFR 40 12/06/2022    CREATININE 1 61 (H) 12/08/2022    CREATININE 1 38 (H) 12/07/2022    CREATININE 1 58 (H) 12/06/2022     Baseline creatinine appears to be around 1 3-1 6s    · Creatinine stable  · Patient was given a dose of Lasix today    JOVI (obstructive sleep apnea)  Assessment & Plan  Continue CPAP at HS    Hyperlipidemia  Assessment & Plan  Continue statin    Chronic a-fib [facility])  Assessment & Plan  Status post pacemaker insertion  On Xarelto at home  · Continue Xarelto  · Optimize electrolytes    Benign prostatic hyperplasia  Assessment & Plan  Continue Proscar  · Monitor postvoid residual    Benign hypertension with chronic kidney disease, stage III ([address] )  Assessment & Plan  On Demadex at home  · Demadex have been on hold in the setting of sepsis  · XL been on hold  Patient was given a dose of Lasix 40 mg IV with blood transfusion today  VTE Pharmacologic Prophylaxis:   High Risk (Score >/= 5) - Pharmacological DVT Prophylaxis Ordered: rivaroxaban (Xarelto)  Sequential Compression Devices Ordered  Patient Centered Rounds: I performed bedside rounds with nursing staff today  Discussions with Specialists or Other Care Team Provider: Cardiology    Education and Discussions with Family / Patient: Updated  (wife and daughter) at bedside  Time Spent for Care: 45 minutes  More than 50% of total time spent on counseling and coordination of care as described above  Current Length of Stay: 2 day(s)  Current Patient Status: Inpatient   Certification Statement: The patient will continue to require additional inpatient hospital stay due to MSSA bacteremia, anemia  Discharge Plan: Anticipate discharge in >72 hrs to pending course    Code Status: Level 1 - Full Code    Subjective:   Patient denies any chest pain, shortness of breath, abdominal pain  Patient did have a dark bowel movement last night and brown large bowel movement this morning  Objective:     Vitals:   Temp (24hrs), Av 7 °F (37 1 °C), Min:97 6 °F (36 4 °C), Max:100 4 °F (38 °C)    Temp:  [97 6 °F (36 4 °C)-100 4 °F (38 °C)] 97 6 °F (36 4 °C)  HR:  [66-80] 70  Resp:  [18] 18  BP: ()/(47-56) 107/56  SpO2:  [93 %-99 %] 96 %  Body mass index is 34 02 kg/m²  Input and Output Summary (last 24 hours):      Intake/Output Summary (Last 24 hours) at 2022 1736  Last data filed at 2022 1725  Gross per 24 hour   Intake --   Output 800 ml   Net -800 ml       Physical Exam:   Physical Exam  Constitutional:       Appearance: Normal appearance  HENT:      Head: Normocephalic and atraumatic  Eyes:      Extraocular Movements: Extraocular movements intact  Pupils: Pupils are equal, round, and reactive to light  Cardiovascular:      Rate and Rhythm: Normal rate  Rhythm irregular  Heart sounds: Murmur heard  No gallop  Pulmonary:      Effort: Pulmonary effort is normal       Breath sounds: Normal breath sounds  Abdominal:      General: Bowel sounds are normal       Palpations: Abdomen is soft  Tenderness: There is no abdominal tenderness  Musculoskeletal:         General: No swelling or deformity  Normal range of motion  Cervical back: Normal range of motion and neck supple  Right lower leg: Edema present  Left lower leg: Edema present  Comments: Bilateral +1 pedal edema   Skin:     General: Skin is warm and dry  Neurological:      General: No focal deficit present  Mental Status: He is alert  Additional Data:     Labs:  Results from last 7 days   Lab Units 12/08/22  0614 12/06/22  0834 12/06/22  0104   WBC Thousand/uL 11 58*   < > 14 80*   HEMOGLOBIN g/dL 7 3*   < > 8 1*   HEMATOCRIT % 23 7*   < > 27 2*   PLATELETS Thousands/uL 128*   < > 158   BANDS PCT %  --   --  7   NEUTROS PCT % 82*   < >  --    LYMPHS PCT % 7*   < >  --    LYMPHO PCT %  --   --  3*   MONOS PCT % 10   < >  --    MONO PCT %  --   --  8   EOS PCT % 0   < > 0    < > = values in this interval not displayed       Results from last 7 days   Lab Units 12/08/22  0614 12/06/22  0834 12/05/22  1919   SODIUM mmol/L 131*   < > 135   POTASSIUM mmol/L 3 8   < > 3 7   CHLORIDE mmol/L 97   < > 96   CO2 mmol/L 25   < > 29   BUN mg/dL 36*   < > 32*   CREATININE mg/dL 1 61*   < > 1 65*   ANION GAP mmol/L 9   < > 10   CALCIUM mg/dL 7 9*   < > 8 6   ALBUMIN g/dL  --   --  2 6*   TOTAL BILIRUBIN mg/dL  --   --  1 70*   ALK PHOS U/L  -- --  85   ALT U/L  --   --  23   AST U/L  --   --  33   GLUCOSE RANDOM mg/dL 177*   < > 140    < > = values in this interval not displayed  Results from last 7 days   Lab Units 12/05/22 1948   INR  1 74*         Results from last 7 days   Lab Units 12/06/22  0104   HEMOGLOBIN A1C % 5 1     Results from last 7 days   Lab Units 12/07/22  0516 12/06/22 0104 12/05/22 1948   LACTIC ACID mmol/L  --   --  1 8   PROCALCITONIN ng/ml 1 80* 1 79*  --        Lines/Drains:  Invasive Devices     Peripheral Intravenous Line  Duration           Peripheral IV 12/05/22 Right Antecubital 2 days                      Imaging: Reviewed radiology reports from this admission including: chest xray, chest CT scan and CT head    Recent Cultures (last 7 days):   Results from last 7 days   Lab Units 12/07/22  0549 12/06/22 2209 12/05/22 1948   BLOOD CULTURE  No Growth at 24 hrs    No Growth at 24 hrs   --  Staphylococcus aureus*  Staphylococcus aureus*   GRAM STAIN RESULT   --   --  Gram positive cocci in clusters*  Gram positive cocci in clusters*   LEGIONELLA URINARY ANTIGEN   --  Negative  --        Last 24 Hours Medication List:   Current Facility-Administered Medications   Medication Dose Route Frequency Provider Last Rate   • acetaminophen  650 mg Oral Q6H PRN NAVA Coleman     • albuterol  2 5 mg Nebulization Q6H PRN NAVA Coleman     • allopurinol  200 mg Oral Daily NAVA Coleman     • atorvastatin  40 mg Oral Daily With NAVA French     • cefazolin  2,000 mg Intravenous Q8H Adonica Hodgkins, MD 2,000 mg (12/08/22 1306)   • docusate sodium  100 mg Oral BID Ron Ellis MD     • DULoxetine  60 mg Oral BID NAVA Coleman     • finasteride  5 mg Oral Daily NAVA Coleman     • fluticasone-vilanterol  1 puff Inhalation Daily NAVA Coleman     • gabapentin  100 mg Oral HS NAVA Coleman     • isosorbide mononitrate  30 mg Oral Daily NAVA Coleamn     • ondansetron  4 mg Intravenous Q6H PRN NAVA Coleman     • pantoprazole  40 mg Oral Early Morning NAVA Coleman     • polyethylene glycol  17 g Oral Daily PRN NAVA Coleman     • polyethylene glycol  17 g Oral Daily Valente Oconnell MD     • rivaroxaban  15 mg Oral Daily With Breakfast NAVA Coleman     • saccharomyces boulardii  250 mg Oral BID NAVA Coleman     • torsemide  40 mg Oral Daily NAVA Coleman     • umeclidinium  1 puff Inhalation Daily NAVA Coleman          Today, Patient Was Seen By: Valente Oconnell MD    **Please Note: This note may have been constructed using a voice recognition system  **

## 2022-12-08 NOTE — PROGRESS NOTES
Progress Note - Infectious Disease   Lisa Jenkins 80 y o  male MRN: [id number]  Unit/Bed#: [address] Encounter: [id number]      Impression/Plan:    1  MSSA bacteremia  POA, 2/2 admission blood cultures growing MSSA  Unclear source, may be skin translocation as the patient has multiple scabs on his arms and legs  This is complicated by the presence of a PPM  TTE with AV and MV thickening, no clear vegetations  MARY with no obvious large vegetations, but given significant calcification cannot rule out small lesion  CT chest with numerous pulmonary nodules, could be septic emboli vs less likely metastatic disease  This does raise concern for a primary endovascular source of infection  Discussed treatment options with the family and Cardiology  PPM removal is recommended in the setting of staph bacteremia regardless of the primary source, however the pacemaker has been in place 12 years and removal would be an extensive surgical procedure  Given his comorbidities, there are risks to surgery  His family would like to pursue a more conservative approach with antibiotics before any surgery    -continue IV Cefazolin 2g q8hr              -follow up repeat blood cultures   -anticipate a 6 week course of antibiotics from blood culture clearance, but if we cannot clear cultures the patient will require pacemaker extraction   -will need surveillance blood cultures after completion of antibiotic course     2  Weakness, encephalopathy  Likely toxic metabolic due to infection as above  CTH showed a hypodensity in the right parieto-occipital region most likely a chronic infarct or metastatic lesion  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               -treatment of infection as above              -neurology follow up              -monitor mental status     3  Leukocytosis  Likely due to #1 above   Patient is afebrile, hemodynamically stable               -antibiotics as above              -monitor WBC count, fever curve     4  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  Complicated by staph aureus bacteremia  MARY no clear vegetations    -management as above     5  Pulmonary nodules and adenopathy  CT chest shows numerous new pulmonary nodules and mediastinal and hilar lymphadenopathy, concerning for metastases however in setting of staph aureus bacteremia could be septic emboli  Patient had a colonoscopy last month without abnormalities  He is a long term smoker  -pulmonary consulted   -will need serial imaging after 4-6 weeks of antibiotics; if persistent will require work up for malignancy     6  CKD  Creatinine stable at baseline  Antibiotics dose adjusted as needed      I have discussed the above management plan in detail with Dr Fili Garrido and Dr Brenden Chan of Cardiology  Discussed with the patient at bedside  ID will follow  Antibiotics:  Cefazolin day 3    Subjective: The patient was sleeping when I came in the room  He was slightly confused upon waking up, thought he was going to a wedding today  But he knew he was in the hospital and the year  Reports fatigue, intermittent chest pain  No fever, chills  Objective:  Vitals:  Temp:  [97 6 °F (36 4 °C)-100 4 °F (38 °C)] 97 6 °F (36 4 °C)  HR:  [66-80] 72  Resp:  [18] 18  BP: ()/(47-56) 92/50  SpO2:  [93 %-99 %] 98 %  Temp (24hrs), Av 5 °F (36 9 °C), Min:97 6 °F (36 4 °C), Max:100 4 °F (38 °C)  Current: Temperature: 97 6 °F (36 4 °C)    Physical Exam:   General Appearance:  He is non toxic appearing but chronically ill    Throat: Oropharynx moist without lesions  Lungs:   Clear to auscultation bilaterally; no wheezes, rhonchi or rales; respirations unlabored   Heart:  RRR; no murmur, rub or gallop   Abdomen:   Soft, non-tender, non-distended, positive bowel sounds  Extremities: No clubbing, cyanosis or edema   Skin: Multiple ecchymoses, scabs on his arms and left knee       Labs:    All pertinent labs and imaging studies were personally reviewed  Results from last 7 days   Lab Units 12/08/22  0614 12/07/22  0516 12/06/22  0834   WBC Thousand/uL 11 58* 9 45 12 55*   HEMOGLOBIN g/dL 7 3* 7 5* 8 6*   PLATELETS Thousands/uL 128* 125* 144*     Results from last 7 days   Lab Units 12/08/22  0614 12/07/22  0516 12/06/22  0834 12/05/22  1919   SODIUM mmol/L 131* 130* 133* 135   POTASSIUM mmol/L 3 8 3 2* 3 6 3 7   CHLORIDE mmol/L 97 95* 96 96   CO2 mmol/L 25 28 29 29   BUN mg/dL 36* 27* 29* 32*   CREATININE mg/dL 1 61* 1 38* 1 58* 1 65*   EGFR ml/min/1 73sq m 39 47 40 38   CALCIUM mg/dL 7 9* 8 2* 8 5 8 6   AST U/L  --   --   --  33   ALT U/L  --   --   --  23   ALK PHOS U/L  --   --   --  85     Results from last 7 days   Lab Units 12/07/22  0516 12/06/22  0104   PROCALCITONIN ng/ml 1 80* 1 79*                   Micro:  Results from last 7 days   Lab Units 12/07/22  0549 12/06/22  2209 12/05/22  1948   BLOOD CULTURE  No Growth at 24 hrs  No Growth at 24 hrs   --  Staphylococcus aureus*  Staphylococcus aureus*   GRAM STAIN RESULT   --   --  Gram positive cocci in clusters*  Gram positive cocci in clusters*   LEGIONELLA URINARY ANTIGEN   --  Negative  --        Imaging:  I have personally reviewed pertinent imaging study reports and images in PACS      CT chest: numerous new pulmonary nodules and mediastinal and hilar lymphadenopathy, concerning for metastases     Other Studies:   I have personally reviewed pertinent reports

## 2022-12-08 NOTE — PROGRESS NOTES
Progress Note - Cardiology   [facility]     Owen Hendrix 80 y o  male MRN: [id number]  : 1940  Unit/Bed#: 2 [address] Encounter: [id number]    Assessment and Plan:   1  Staph aureus bacteremia: Followed by infectious disease     -   Patient will need 6 weeks of IV antibiotic therapy    -   Continue monitor blood cultures    2  Anemia with history of GI bleed: We will discontinue Plavix at this time secondary to patient's anemia    -   Hemoglobin 7 3 today and he is scheduled for 1 unit of packed red blood cells    -   We will give Lasix 40 mg IV prior to transfusion    3  Persistent atrial fibrillation: Patient not on any AV tadeo blocking medications    -   Continue Xarelto    4  Sick sinus syndrome status post pacemaker: Stable functioning    -   No large vegetation was seen on MARY but cannot rule out small vegetation due to heavy calcification, and no clear vegetation was noted on visualized portion of pacemaker wires    5   Chronic Kidney disease    Subjective / Objective:   Patient seen and examined  He complained of some midsternal chest discomfort but is unable to really quantify it  This discomfort has been ongoing  It is not associated with shortness of breath, diaphoresis or nausea or vomiting  Patient's hemoglobin is 7 3 today and he is scheduled for 1 unit of packed red blood cells  We will give 40 mg of IV Lasix prior to transfusion  Vitals: Blood pressure 106/55, pulse 66, temperature 98 °F (36 7 °C), resp  rate 18, height 6' 2" (1 88 m), weight 120 kg (265 lb), SpO2 97 %  Vitals:    22 1132 22 1106   Weight: 120 kg (265 lb) 120 kg (265 lb)     Body mass index is 34 02 kg/m²    BP Readings from Last 3 Encounters:   22 106/55   22 119/64   10/20/22 130/70     Orthostatic Blood Pressures    Flowsheet Row Most Recent Value   Blood Pressure 106/55 filed at 2022 0625   Patient Position - Orthostatic VS Lying filed at 2022 8331        I/O       12/06 0701 12/07 0700 12/07 0701 12/08 0700 12/08 0701 12/09 0700    I V  (mL/kg)  250 (2 1)     Total Intake(mL/kg)  250 (2 1)     Urine (mL/kg/hr) 1125 (0 4) 850 (0 3)     Stool  0     Total Output 1125 850     Net -1125 -600            Unmeasured Urine Occurrence  1 x     Unmeasured Stool Occurrence  1 x         Invasive Devices     Peripheral Intravenous Line  Duration           Peripheral IV 12/05/22 Right Antecubital 2 days                  Intake/Output Summary (Last 24 hours) at 12/8/2022 1319  Last data filed at 12/7/2022 1405  Gross per 24 hour   Intake --   Output 500 ml   Net -500 ml         Physical Exam:   Physical Exam  Vitals and nursing note reviewed  Constitutional:       General: He is not in acute distress  Appearance: Normal appearance  He is obese  HENT:      Right Ear: External ear normal       Left Ear: External ear normal       Nose: Nose normal    Eyes:      General: No scleral icterus  Right eye: No discharge  Left eye: No discharge  Cardiovascular:      Rate and Rhythm: Normal rate and regular rhythm  Pulses: Normal pulses  Heart sounds: Normal heart sounds  No murmur heard  Pulmonary:      Effort: Pulmonary effort is normal  No accessory muscle usage or respiratory distress  Breath sounds: Examination of the right-lower field reveals decreased breath sounds  Examination of the left-lower field reveals decreased breath sounds  Decreased breath sounds present  Abdominal:      General: Bowel sounds are normal  There is no distension  Palpations: Abdomen is soft  Musculoskeletal:      Right lower leg: No edema  Left lower leg: No edema  Skin:     General: Skin is warm and dry  Capillary Refill: Capillary refill takes less than 2 seconds  Neurological:      General: No focal deficit present  Mental Status: He is alert  Mental status is at baseline     Psychiatric:         Mood and Affect: Mood normal  Medications/ Allergies:     Current Facility-Administered Medications   Medication Dose Route Frequency Provider Last Rate   • acetaminophen  650 mg Oral Q6H PRN NAVA Coleman     • albuterol  2 5 mg Nebulization Q6H PRN NAVA Coleman     • allopurinol  200 mg Oral Daily NAVA Coleman     • atorvastatin  40 mg Oral Daily With NAVA French     • cefazolin  2,000 mg Intravenous Q8H Reinier Rojo MD 2,000 mg (12/08/22 1306)   • docusate sodium  100 mg Oral BID Reinier Rojo MD     • DULoxetine  60 mg Oral BID NAVA Coleman     • finasteride  5 mg Oral Daily NAVA Coleman     • fluticasone-vilanterol  1 puff Inhalation Daily NAVA Coleman     • furosemide  40 mg Intravenous Once Lenin NAVA Coronel     • gabapentin  100 mg Oral HS NAVA Coleman     • isosorbide mononitrate  30 mg Oral Daily NAVA Coleman     • ondansetron  4 mg Intravenous Q6H PRN NAVA Coleman     • pantoprazole  40 mg Oral Early Morning NAVA Coleman     • polyethylene glycol  17 g Oral Daily PRN NAVA Coleman     • polyethylene glycol  17 g Oral Daily Reinier Rojo MD     • rivaroxaban  15 mg Oral Daily With Breakfast NAVA Coleman     • saccharomyces boulardii  250 mg Oral BID NAVA Coleman     • torsemide  40 mg Oral Daily NAVA Coleman     • umeclidinium  1 puff Inhalation Daily NAVA Coleman       acetaminophen, 650 mg, Q6H PRN  albuterol, 2 5 mg, Q6H PRN  ondansetron, 4 mg, Q6H PRN  polyethylene glycol, 17 g, Daily PRN      No Known Allergies    VTE Pharmacologic Prophylaxis:   Sequential compression device (Venodyne)  and Xarelto    Labs:   Troponins:  Results from last 7 days   Lab Units 12/07/22  1945 12/06/22  0834 12/05/22  2323 12/05/22  2109   HS TNI RAND ng/L 723* 763*  --   --    HSTNI D2 ng/L  --   --   --  -27   HSTNI D4 ng/L  --   --  29*  --          CBC with diff:  Results from last 7 days   Lab Units 12/08/22  0772 12/07/22  0516 12/06/22  0834 12/06/22  0104 12/05/22  1919   WBC Thousand/uL 11 58* 9 45 12 55* 14 80* 15 38*   HEMOGLOBIN g/dL 7 3* 7 5* 8 6* 8 1* 9 1*   HEMATOCRIT % 23 7* 24 2* 28 5* 27 2* 30 7*   MCV fL 86 87 90 90 90   PLATELETS Thousands/uL 128* 125* 144* 158 187   MCH pg 26 5* 26 9 27 1 26 6* 26 7*   MCHC g/dL 30 8* 31 0* 30 2* 29 8* 29 6*   RDW % 20 8* 20 6* 21 4* 21 3* 21 8*   MPV fL 10 3 9 4 9 4 9 3 9 5   NRBC AUTO /100 WBCs 0 0 0  --   --        CMP:  Results from last 7 days   Lab Units 12/08/22 0614 12/07/22  0516 12/06/22  0834 12/05/22  1919   SODIUM mmol/L 131* 130* 133* 135   POTASSIUM mmol/L 3 8 3 2* 3 6 3 7   CHLORIDE mmol/L 97 95* 96 96   CO2 mmol/L 25 28 29 29   ANION GAP mmol/L 9 7 8 10   BUN mg/dL 36* 27* 29* 32*   CREATININE mg/dL 1 61* 1 38* 1 58* 1 65*   GLUCOSE FASTING mg/dL  --   --  142*  --    CALCIUM mg/dL 7 9* 8 2* 8 5 8 6   AST U/L  --   --   --  33   ALT U/L  --   --   --  23   ALK PHOS U/L  --   --   --  85   TOTAL PROTEIN g/dL  --   --   --  7 3   ALBUMIN g/dL  --   --   --  2 6*   TOTAL BILIRUBIN mg/dL  --   --   --  1 70*   EGFR ml/min/1 73sq m 39 47 40 38       Magnesium:  Results from last 7 days   Lab Units 12/06/22  0834   MAGNESIUM mg/dL 1 9     Coags:  Results from last 7 days   Lab Units 12/05/22  1948   PTT seconds 32   INR  1 74*     TSH:    No components found for: TSH3  Lipid Profile:  Results from last 7 days   Lab Units 12/06/22  0834   CHOLESTEROL mg/dL 84   TRIGLYCERIDES mg/dL 55   HDL mg/dL 37*   LDL CALC mg/dL 36     Hgb A1c:  Results from last 7 days   Lab Units 12/06/22 0104   HEMOGLOBIN A1C % 5 1     NT-proBNP:   Recent Labs     12/06/22 0104   NTBNP 14,013*        Imaging & Testing   I have personally reviewed pertinent reports      EGD    Addendum Date: 11/29/2022 Addendum:   [facility] Operating Room [address] [place] [id number] [phone] DATE OF SERVICE: 11/18/22 PHYSICIAN(S): Attending: Valentin Diaz MD Fellow: No Staff Documented Procedure :  EGD with biopsies INDICATION: Iron deficiency anemia, unspecified iron deficiency anemia type POST-OP DIAGNOSIS: See the impression below  PREPROCEDURE: Informed consent was obtained for the procedure, including sedation  Risks of perforation, hemorrhage, adverse drug reaction and aspiration were discussed  The patient was placed in the left lateral decubitus position  Patient was explained about the risks and benefits of the procedure  Risks including but not limited to bleeding, infection, and perforation were explained in detail  Also explained about less than 100% sensitivity with the exam and other alternatives  DETAILS OF PROCEDURE: Patient was taken to the procedure room where a time out was performed to confirm correct patient and correct procedure  The patient underwent monitored anesthesia care, which was administered by an anesthesia professional  The patient's blood pressure, heart rate, level of consciousness, respirations and oxygen were monitored throughout the procedure  The scope was advanced to the second part of the duodenum  Retroflexion was performed in the fundus  Prior to the procedure, the patient's H  Pylori status was unknown  The patient experienced no blood loss  The procedure was not difficult  The patient tolerated the procedure well  There were no apparent complications   ANESTHESIA INFORMATION: ASA: III Anesthesia Type: IV Sedation with Anesthesia MEDICATIONS: No administrations occurring from 1438 to 1448 on 11/18/22 FINDINGS: Multiple whitish plaque in the upper and middle esophagus suggests evidence of Candida esophagitis, biopsies were done Mild, localized erythematous mucosa in the antrum, consistent with gastritis; performed cold forceps biopsy to rule out H  pylori Single medium diverticulum in the fundus of the stomach; no bleeding was identified The duodenal bulb, 1st part of the duodenum and 2nd part of the duodenum appeared normal  SPECIMENS: ID Type Source Tests Collected by Time Destination 1 : antral bxs Tissue Stomach TISSUE EXAM Gabby Viramontes MD 11/18/2022  2:45 PM  2 : esophageal bxs- r/o candida Tissue Esophagus TISSUE EXAM Gabby Viramontes MD 11/18/2022  2:46 PM  IMPRESSION: 1  Candida esophagitis 2  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 3  Mild gastritis 4  Normal duodenum 5  No sign of upper GI bleeding RECOMMENDATION:  Await pathology results     Diflucan 100 mg p o  Q day     Pantoprazole 40 mg p o  Q day     Monitor CBC     Repeat EGD in 3years    Gabby Viramontes MD     Result Date: 11/29/2022  Narrative: Table formatting from the original result was not included  [address] [facility] Operating Room [address] [place] [id number] [phone] DATE OF SERVICE: 11/18/22 PHYSICIAN(S): Attending: Gabby Viramontes MD Fellow: No Staff Documented Procedure :  EGD with biopsies INDICATION: Iron deficiency anemia, unspecified iron deficiency anemia type POST-OP DIAGNOSIS: See the impression below  PREPROCEDURE: Informed consent was obtained for the procedure, including sedation  Risks of perforation, hemorrhage, adverse drug reaction and aspiration were discussed  The patient was placed in the left lateral decubitus position  Patient was explained about the risks and benefits of the procedure  Risks including but not limited to bleeding, infection, and perforation were explained in detail  Also explained about less than 100% sensitivity with the exam and other alternatives  DETAILS OF PROCEDURE: Patient was taken to the procedure room where a time out was performed to confirm correct patient and correct procedure  The patient underwent monitored anesthesia care, which was administered by an anesthesia professional  The patient's blood pressure, heart rate, level of consciousness, respirations and oxygen were monitored throughout the procedure   The scope was advanced to the second part of the duodenum  Retroflexion was performed in the fundus  Prior to the procedure, the patient's H  Pylori status was unknown  The patient experienced no blood loss  The procedure was not difficult  The patient tolerated the procedure well  There were no apparent complications  ANESTHESIA INFORMATION: ASA: III Anesthesia Type: IV Sedation with Anesthesia MEDICATIONS: No administrations occurring from 1438 to 1448 on 11/18/22 FINDINGS: Multiple whitish plaque in the upper and middle esophagus suggests evidence of Candida esophagitis, biopsies were done Mild, localized erythematous mucosa in the antrum, consistent with gastritis; performed cold forceps biopsy to rule out H  pylori Single medium diverticulum in the fundus of the stomach; no bleeding was identified The duodenal bulb, 1st part of the duodenum and 2nd part of the duodenum appeared normal  SPECIMENS: ID Type Source Tests Collected by Time Destination 1 : antral bxs Tissue Stomach TISSUE EXAM Lui Win MD 11/18/2022  2:45 PM  2 : esophageal bxs- r/o candida Tissue Esophagus TISSUE EXAM Lui Win MD 11/18/2022  2:46 PM      Impression: 1  Candida esophagitis 2  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 3  Mild gastritis 4  Normal duodenum 5  No sign of upper GI bleeding RECOMMENDATION:  Await pathology results     Diflucan 100 mg p o  Q day     Pantoprazole 40 mg p o  Q day     Monitor CBC   Lui Win MD     Colonoscopy    Result Date: 11/21/2022  Narrative: Table formatting from the original result was not included  [address] [place] [address] Operating Room [address] [place] [id number] [phone] DATE OF SERVICE: 11/21/22 PHYSICIAN(S): Attending: Rico Madison MD Fellow: No Staff Documented INDICATION: Iron deficiency anemia, unspecified iron deficiency anemia type POST-OP DIAGNOSIS: See the impression below  HISTORY: Prior colonoscopy: More than 10 years ago   BOWEL PREPARATION: Golytely/Colyte/Trilyte PREPROCEDURE: Informed consent was obtained for the procedure, including sedation  Risks including but not limited to bleeding, infection, perforation, adverse drug reaction and aspiration were explained in detail  Also explained about less than 100% sensitivity with the exam and other alternatives  The patient was placed in the left lateral decubitus position  DETAILS OF PROCEDURE: Patient was taken to the procedure room where a time out was performed to confirm correct patient and correct procedure  The patient underwent monitored anesthesia care, which was administered by an anesthesia professional  The patient's blood pressure, heart rate, level of consciousness, oxygen and respirations were monitored throughout the procedure  A digital rectal exam was performed  The scope was introduced through the anus and advanced to the cecum  Retroflexion was performed in the rectum  The quality of bowel preparation was evaluated using the [facility] Bowel Preparation Scale with scores of: right colon = 1, transverse colon = 1, left colon = 1  The total BBPS score was 3  Bowel prep was not adequate  The patient experienced no blood loss  The procedure was not difficult  The patient tolerated the procedure well  There were no apparent complications  ANESTHESIA INFORMATION: ASA: III Anesthesia Type: IV Sedation with Anesthesia MEDICATIONS: No administrations occurring from 1533 to 1707 on 11/21/22 FINDINGS: All observed locations appeared normal  EVENTS: Procedure Events Event Event Time ENDO CECUM REACHED 11/21/2022  3:51 PM ENDO SCOPE OUT TIME 11/21/2022  3:58 PM SPECIMENS: * No specimens in log * EQUIPMENT: Colonoscope -     Impression: No blood or bleeding source identified   RECOMMENDATION:  No further screening colonoscopies necessary  Age greater than 72   Consider outpatient capsule endoscopy May restart Elena Marquez MD     CT abdomen pelvis wo contrast    Result Date: 11/20/2022  Narrative: CT ABDOMEN AND PELVIS WITHOUT IV CONTRAST INDICATION:   Anemia Retroperitoneal hematoma suspected anemia, recent fall r/o hematoma  COMPARISON: CT renal stone 4/8/2021, CT abdomen and pelvis 12/8/2012 TECHNIQUE:  CT examination of the abdomen and pelvis was performed without intravenous contrast  Axial, sagittal, and coronal 2D reformatted images were created from the source data and submitted for interpretation  Radiation dose length product (DLP) for this visit:  1173 83 mGy-cm   This examination, like all CT scans performed in the [facility], was performed utilizing techniques to minimize radiation dose exposure, including the use of iterative reconstruction and automated exposure control  Enteric contrast was not administered  FINDINGS: ABDOMEN LOWER CHEST:  Small right and trace left pleural effusions with bibasilar atelectasis are present  LIVER/BILIARY TREE:  Unremarkable  GALLBLADDER:  Gallbladder is surgically absent  SPLEEN:  Unremarkable  PANCREAS:  Unremarkable  ADRENAL GLANDS:  Unremarkable  KIDNEYS/URETERS:  Nonobstructing left lower pole calculus is stable  No hydronephrosis  Simple cyst is present within the left upper pole  STOMACH AND BOWEL:  There is colonic diverticulosis without evidence of acute diverticulitis  APPENDIX:  No findings to suggest appendicitis  ABDOMINOPELVIC CAVITY:  No ascites  No pneumoperitoneum  No lymphadenopathy  VESSELS:  Unremarkable for patient's age  PELVIS REPRODUCTIVE ORGANS:  Unremarkable for patient's age  URINARY BLADDER:  Unremarkable  ABDOMINAL WALL/INGUINAL REGIONS:  There is a small fat-containing umbilical hernia, unchanged from previous examination  OSSEOUS STRUCTURES:  Healing right intertrochanteric fracture status post internal fixation is noted  Alignment is near-anatomic  Impression: No acute abnormality within the abdomen or pelvis  Specifically, no retroperitoneal hematoma identified   Workstation performed: [id number]     XR chest 1 view portable    Result Date: 12/6/2022  Narrative: CHEST INDICATION:   cough  COMPARISON:  Chest x-ray 11/17/2022 EXAM PERFORMED/VIEWS:  XR CHEST PORTABLE FINDINGS:  Left-sided chest wall intracardiac device is identified  Leads are intact  The heart is enlarged, stable  Prominent pulmonary vasculature and interstitial markings suggesting moderate congestive changes, stable  No pneumothorax or pleural effusion  Osseous structures appear within normal limits for patient age  Impression: Prominent pulmonary vasculature and interstitial markings suggesting moderate congestive changes, stable  Workstation performed: [id number]     XR chest 1 view portable    Result Date: 11/17/2022  Narrative: CHEST INDICATION:   SOB  COMPARISON:  None EXAM PERFORMED/VIEWS:  XR CHEST PORTABLE FINDINGS:  Left-sided chest wall intracardiac device is identified  Leads are intact  Moderate cardiomegaly  Prominent vascular markings  No pneumothorax or pleural effusion  Osseous structures appear within normal limits for patient age  Impression: Cardiomegaly with moderate congestive changes  Workstation performed: [id number]     CT head without contrast    Addendum Date: 12/6/2022 Addendum:   ADDENDUM: In light of the concurrent chest CT findings, the right parieto-occipital abnormality may also represent a metastasis    Result Date: 12/6/2022  Narrative: CT BRAIN - WITHOUT CONTRAST INDICATION:   Delirium AMS  COMPARISON:  None  TECHNIQUE:  CT examination of the brain was performed  In addition to axial images, sagittal and coronal 2D reformatted images were created and submitted for interpretation  Radiation dose length product (DLP) for this visit:  962 13 mGy-cm   This examination, like all CT scans performed in the [facility], was performed utilizing techniques to minimize radiation dose exposure, including the use of iterative  reconstruction and automated exposure control  IMAGE QUALITY:  Diagnostic   FINDINGS: PARENCHYMA: Decreased attenuation is noted in periventricular and subcortical white matter demonstrating an appearance that is statistically most likely to represent mild microangiopathic change  Parenchymal hypodensity within the right parieto-occipital region primarily affecting the white matter No CT signs of acute infarction  No intracranial mass, mass effect or midline shift  No acute parenchymal hemorrhage  VENTRICLES AND EXTRA-AXIAL SPACES:  Normal for the patient's age  VISUALIZED ORBITS AND PARANASAL SINUSES:  Normal visualized orbits  Normal visualized paranasal sinuses  CALVARIUM AND EXTRACRANIAL SOFT TISSUES:  Normal      Impression: Hypodensity in the right parieto-occipital region most likely represents a chronic infarct, however no prior study is available to confirm this The study was marked in EPIC for immediate notification  Workstation performed: [id number]     CT chest without contrast    Result Date: 12/6/2022  Narrative: CT CHEST WITHOUT IV CONTRAST INDICATION:   Pneumonia, effusion or abscess suspected, xray done Hypoxia, occult pneumonia  COMPARISON:  CT 7/9/21 TECHNIQUE: CT examination of the chest was performed without intravenous contrast  Axial, sagittal, and coronal 2D reformatted images were created from the source data and submitted for interpretation  Radiation dose length product (DLP) for this visit:  713 14 mGy-cm   This examination, like all CT scans performed in the [facility], was performed utilizing techniques to minimize radiation dose exposure, including the use of iterative  reconstruction and automated exposure control   FINDINGS: LUNGS:  Moderate emphysema 8mm right apical nodule series 3/16, new 4 mm right lobe nodule series 3/48, new 9 mm left lower lobe nodule series 3/82, new 6 mm left lower lobe nodule series 3/70, new 11 mm lingular nodule series 3/63, new 5 mm left upper lobe nodule series 3/49, new 10 mm left upper lobe nodule series 3/54, new Previously noted 7 mm left basal nodule is not appreciated PLEURA:  Small right pleural effusion with adjacent basal opacity, most likely represents atelectasis HEART/GREAT VESSELS: Left Mediport  dense mitral and aortic valve calcification MEDIASTINUM AND DEBRA:  1 6 cm right paratracheal lymph node, with additional similar sized paratracheal lymph nodes 1 4 cm right hilar lymph node 1 7 cm subcarinal lymph node CHEST WALL AND LOWER NECK:  Unremarkable  VISUALIZED STRUCTURES IN THE UPPER ABDOMEN:  Unremarkable  Likely fundal gastric diverticulum OSSEOUS STRUCTURES:  No acute fracture or destructive osseous lesion  Impression: 1  Numerous new pulmonary nodules, concerning for metastatic disease 2  Mediastinal and hilar lymphadenopathy, also concerning for metastases 3  Small right pleural effusion Workstation performed: [id number]     Echo complete w/ contrast if indicated    Result Date: 12/6/2022  Narrative: •  Left Ventricle: Left ventricular cavity size is normal  Wall thickness is not well visualized  There is severe concentric hypertrophy  The left ventricular ejection fraction is 60% by visual estimation  Systolic function is normal  Although no diagnostic regional wall motion abnormality was identified, this possibility cannot be completely excluded on the basis of this study  •  IVS: There is abnormal septal motion consistent with right ventricular pacing  •  Left Atrium: The atrium is severely dilated  •  Right Atrium: The atrium is mildly dilated  •  Atrial Septum: There is no atrial septal defect by saline contrast   Limited quality study for ASD No patent foramen ovale detected at rest   Limited sensitivity and specificity due to poor quality bubble study  If clinically needed consider MARY •  Aortic Valve: The aortic valve is trileaflet  The leaflets are severely thickened  The leaflets are moderately calcified  There is severely reduced mobility  There is mild regurgitation  There is severe stenosis    Mean gradient around 40 mmHg  Peak gradient 72 mmHg  Dimensionless index is 0 28  Consistent with patient old history of severe aortic stenosis with valve area less than around 0 9 cm2 •  Mitral Valve: There is moderate diffuse thickening of the anterior leaflet and posterior leaflet  There is mild calcification  There is moderately reduced mobility of the posterior leaflet  There is moderate annular calcification  There is mild to moderate regurgitation  There is at least mild stenosis  Mean gradient from 4 to 5 mmHg  MARY    Result Date: 12/7/2022  Narrative: •  Left Ventricle: Left ventricular cavity size is normal  Wall thickness is moderately increased  There is mild to moderate concentric hypertrophy  The left ventricular ejection fraction is 55% by visual estimation  Systolic function is normal  Although no diagnostic regional wall motion abnormality was identified, this possibility cannot be completely excluded on the basis of this study  •  IVS: There is abnormal septal motion consistent with right ventricular pacing  •  Left Atrium: The atrium is moderately dilated  There is no thrombus  There is no mass  There is mild spontaneous echo contrast  •  Right Atrium: The atrium is mildly dilated  There is no thrombus  There is a possible mass  •  Atrial Septum: There is no atrial septal defect by saline contrast  No patent foramen ovale detected using saline contrast injection at rest  •  Left Atrial Appendage: There is reduced function  There is a possible thrombus  There is mild spontaneous echo contrast  •  Aortic Valve: The aortic valve is trileaflet  The leaflets are moderately thickened  The leaflets are moderately calcified  There is severely reduced mobility  No large echodensity consistent with shape of vegetation noted however due to heavy calcification cannot rule out small vegetations There is mild regurgitation  •  Mitral Valve:  There is moderate thickening of the anterior leaflet and posterior leaflet  Calcification of anterior and posterior leaflet noted  No large vegetation noted however studies not sensitive enough to rule out small vegetation due to acoustic shadowing and calcification  There is moderate annular calcification  There is mild to moderate regurgitation  •  Tricuspid Valve: There is mild regurgitation  •  MARY shows no large vegetation however study is not sensitive enough to rule out small vegetation due to heavy calcification  No clear vegetation noted on the visualized portion of pacemaker wires  Report discussed with patient, patient's family and infectious disease specialist and medical team          Ashley  Cardiology    "This note was completed in part utilizing [employer] direct voice recognition software  Grammatical errors, random word insertion, spelling mistakes, and incomplete sentences may be an occasional consequence of the system secondary to software limitations, ambient noise and hardware issues  Please read the chart carefully and recognize, using context, where substitutions have occurred    If you have any questions or concerns about the context, text or information contained within the body of this dictation, please contact myself, the provider, for further clarification "

## 2022-12-08 NOTE — PLAN OF CARE
Problem: Potential for Falls  Goal: Patient will remain free of falls  Description: INTERVENTIONS:  - Educate patient/family on patient safety including physical limitations  - Instruct patient to call for assistance with activity   - Consult OT/PT to assist with strengthening/mobility   - Keep Call bell within reach  - Keep bed low and locked with side rails adjusted as appropriate  - Keep care items and personal belongings within reach  - Initiate and maintain comfort rounds  - Make Fall Risk Sign visible to staff  - Offer Toileting every 2 Hours, in advance of need  - Initiate/Maintain alarm  - Obtain necessary fall risk management equipment  - Apply yellow socks and bracelet for high fall risk patients  - Consider moving patient to room near nurses station  Outcome: Progressing     Problem: RESPIRATORY - ADULT  Goal: Achieves optimal ventilation and oxygenation  Description: INTERVENTIONS:  - Assess for changes in respiratory status  - Assess for changes in mentation and behavior  - Position to facilitate oxygenation and minimize respiratory effort  - Oxygen administered by appropriate delivery if ordered  - Initiate smoking cessation education as indicated  - Encourage broncho-pulmonary hygiene including cough, deep breathe, Incentive Spirometry  - Assess the need for suctioning and aspirate as needed  - Assess and instruct to report SOB or any respiratory difficulty  - Respiratory Therapy support as indicated  Outcome: Progressing     Problem: MOBILITY - ADULT  Goal: Maintain or return to baseline ADL function  Description: INTERVENTIONS:  -  Assess patient's ability to carry out ADLs; assess patient's baseline for ADL function and identify physical deficits which impact ability to perform ADLs (bathing, care of mouth/teeth, toileting, grooming, dressing, etc )  - Assess/evaluate cause of self-care deficits   - Assess range of motion  - Assess patient's mobility; develop plan if impaired  - Assess patient's need for assistive devices and provide as appropriate  - Encourage maximum independence but intervene and supervise when necessary  - Involve family in performance of ADLs  - Assess for home care needs following discharge   - Consider OT consult to assist with ADL evaluation and planning for discharge  - Provide patient education as appropriate  Outcome: Progressing  Goal: Maintains/Returns to pre admission functional level  Description: INTERVENTIONS:  - Perform BMAT or MOVE assessment daily    - Set and communicate daily mobility goal to care team and patient/family/caregiver  - Collaborate with rehabilitation services on mobility goals if consulted  - Perform Range of Motion 3 times a day  - Reposition patient every 2 hours    - Dangle patient 3 times a day  - Stand patient 3 times a day  - Ambulate patient 3 times a day  - Out of bed to chair 3 times a day   - Out of bed for meals 3 times a day  - Out of bed for toileting  - Record patient progress and toleration of activity level   Outcome: Progressing     Problem: Prexisting or High Potential for Compromised Skin Integrity  Goal: Skin integrity is maintained or improved  Description: INTERVENTIONS:  - Identify patients at risk for skin breakdown  - Assess and monitor skin integrity  - Assess and monitor nutrition and hydration status  - Monitor labs   - Assess for incontinence   - Turn and reposition patient  - Assist with mobility/ambulation  - Relieve pressure over bony prominences  - Avoid friction and shearing  - Provide appropriate hygiene as needed including keeping skin clean and dry  - Evaluate need for skin moisturizer/barrier cream  - Collaborate with interdisciplinary team   - Patient/family teaching  - Consider wound care consult   Outcome: Progressing     Problem: Neurological Deficit  Goal: Neurological status is stable or improving  Description: Interventions:  - Monitor and assess patient's level of consciousness, motor function, sensory function, and level of assistance needed for ADLs  - Monitor and report changes from baseline  Collaborate with interdisciplinary team to initiate plan and implement interventions as ordered  - Provide and maintain a safe environment  - Consider seizure precautions  - Consider fall precautions  - Consider aspiration precautions  - Consider bleeding precautions  Outcome: Progressing     Problem: Activity Intolerance/Impaired Mobility  Goal: Mobility/activity is maintained at optimum level for patient  Description: Interventions:  - Assess and monitor patient  barriers to mobility and need for assistive/adaptive devices  - Assess patient's emotional response to limitations  - Collaborate with interdisciplinary team and initiate plans and interventions as ordered  - Encourage independent activity per ability   - Maintain proper body alignment  - Perform active/passive rom as tolerated/ordered  - Plan activities to conserve energy   - Turn patient as appropriate  Outcome: Progressing     Problem: Communication Impairment  Goal: Ability to express needs and understand communication  Description: Assess patient's communication skills and ability to understand information  Patient will demonstrate use of effective communication techniques, alternative methods of communication and understanding even if not able to speak  - Encourage communication and provide alternate methods of communication as needed  - Collaborate with case management/ for discharge needs  - Include patient/family/caregiver in decisions related to communication  Outcome: Progressing     Problem: Potential for Aspiration  Goal: Non-ventilated patient's risk of aspiration is minimized  Description: Assess and monitor vital signs, respiratory status, and labs (WBC)  Monitor for signs of aspiration (tachypnea, cough, rales, wheezing, cyanosis, fever)  - Assess and monitor patient's ability to swallow    - Place patient up in chair to eat if possible  - HOB up at 90 degrees to eat if unable to get patient up into chair   - Supervise patient during oral intake  - Instruct patient/ family to take small bites  - Instruct patient/ family to take small single sips when taking liquids  - Follow patient-specific strategies generated by speech pathologist   Outcome: Progressing     Problem: Nutrition  Goal: Nutrition/Hydration status is improving  Description: Monitor and assess patient's nutrition/hydration status for malnutrition (ex- brittle hair, bruises, dry skin, pale skin and conjunctiva, muscle wasting, smooth red tongue, and disorientation)  Collaborate with interdisciplinary team and initiate plan and interventions as ordered  Monitor patient's weight and dietary intake as ordered or per policy  Utilize nutrition screening tool and intervene per policy  Determine patient's food preferences and provide high-protein, high-caloric foods as appropriate  - Assist patient with eating   - Allow adequate time for meals   - Encourage patient to take dietary supplement as ordered  - Collaborate with clinical nutritionist   - Include patient/family/caregiver in decisions related to nutrition  Outcome: Progressing     Problem: Nutrition/Hydration-ADULT  Goal: Nutrient/Hydration intake appropriate for improving, restoring or maintaining nutritional needs  Description: Monitor and assess patient's nutrition/hydration status for malnutrition  Collaborate with interdisciplinary team and initiate plan and interventions as ordered  Monitor patient's weight and dietary intake as ordered or per policy  Utilize nutrition screening tool and intervene as necessary  Determine patient's food preferences and provide high-protein, high-caloric foods as appropriate       INTERVENTIONS:  - Monitor oral intake, urinary output, labs, and treatment plans  - Assess nutrition and hydration status and recommend course of action  - Evaluate amount of meals eaten  - Assist patient with eating if necessary   - Allow adequate time for meals  - Recommend/ encourage appropriate diets, oral nutritional supplements, and vitamin/mineral supplements  - Order, calculate, and assess calorie counts as needed  - Recommend, monitor, and adjust tube feedings and TPN/PPN based on assessed needs  - Assess need for intravenous fluids  - Provide specific nutrition/hydration education as appropriate  - Include patient/family/caregiver in decisions related to nutrition  Outcome: Progressing

## 2022-12-08 NOTE — ASSESSMENT & PLAN NOTE
Likely acute toxic metabolic encephalopathy due to underlying infection, rule out CVA  Patient reports sleepy and feeling weak, oriented to place and person day of week on exam   · CT head showed - Hypodensity in the right parieto-occipital region most likely represents a chronic infarct, however no prior study is available to confirm this    Also differential could be metastatic disease based on the CT of the chest  · No focal neuro deficit on exam   Mental status has improved significantly with treatment of sepsis  · ABG no hypercapnia  · Patient was initially worked up for possible stroke  · Neurology input appreciated  · Continue Xarelto 15 mg p o  daily and Zocor 20 mg p o  daily  · Patient could not get MRI of the brain due to incompatible pacemaker  · Was given full dose aspirin in the ED along with Lipitor 80 mg p o  daily and was continued on Lipitor 40 mg p o  daily  · Hemoglobin A1c was 5 1  ·  neurology recommending repeating CT scan in 1 week  · PT/OT evaluation and treatment

## 2022-12-08 NOTE — ASSESSMENT & PLAN NOTE
On Demadex at home  · Demadex have been on hold in the setting of sepsis  · XL been on hold  Patient was given a dose of Lasix 40 mg IV with blood transfusion today

## 2022-12-09 DIAGNOSIS — M54.16 RADICULOPATHY OF LUMBAR REGION: ICD-10-CM

## 2022-12-09 LAB
ABO GROUP BLD BPU: NORMAL
ANION GAP SERPL CALCULATED.3IONS-SCNC: 10 MMOL/L (ref 4–13)
ATRIAL RATE: 357 BPM
ATRIAL RATE: 84 BPM
ATRIAL RATE: 86 BPM
BPU ID: NORMAL
BUN SERPL-MCNC: 37 MG/DL (ref 5–25)
CALCIUM SERPL-MCNC: 8.2 MG/DL (ref 8.3–10.1)
CHLORIDE SERPL-SCNC: 96 MMOL/L (ref 96–108)
CO2 SERPL-SCNC: 27 MMOL/L (ref 21–32)
CREAT SERPL-MCNC: 1.65 MG/DL (ref 0.6–1.3)
CROSSMATCH: NORMAL
ERYTHROCYTE [DISTWIDTH] IN BLOOD BY AUTOMATED COUNT: 19.9 % (ref 11.6–15.1)
GFR SERPL CREATININE-BSD FRML MDRD: 38 ML/MIN/1.73SQ M
GLUCOSE SERPL-MCNC: 136 MG/DL (ref 65–140)
HCT VFR BLD AUTO: 24.6 % (ref 36.5–49.3)
HGB BLD-MCNC: 7.6 G/DL (ref 12–17)
MCH RBC QN AUTO: 26.8 PG (ref 26.8–34.3)
MCHC RBC AUTO-ENTMCNC: 30.9 G/DL (ref 31.4–37.4)
MCV RBC AUTO: 87 FL (ref 82–98)
PLATELET # BLD AUTO: 129 THOUSANDS/UL (ref 149–390)
PMV BLD AUTO: 10.5 FL (ref 8.9–12.7)
POTASSIUM SERPL-SCNC: 4 MMOL/L (ref 3.5–5.3)
QRS AXIS: 24 DEGREES
QRS AXIS: 69 DEGREES
QRS AXIS: 71 DEGREES
QRSD INTERVAL: 102 MS
QRSD INTERVAL: 98 MS
QRSD INTERVAL: 98 MS
QT INTERVAL: 380 MS
QT INTERVAL: 392 MS
QT INTERVAL: 406 MS
QTC INTERVAL: 416 MS
QTC INTERVAL: 441 MS
QTC INTERVAL: 449 MS
RBC # BLD AUTO: 2.84 MILLION/UL (ref 3.88–5.62)
SODIUM SERPL-SCNC: 133 MMOL/L (ref 135–147)
T WAVE AXIS: -65 DEGREES
T WAVE AXIS: -66 DEGREES
T WAVE AXIS: -87 DEGREES
UNIT DISPENSE STATUS: NORMAL
UNIT PRODUCT CODE: NORMAL
UNIT PRODUCT VOLUME: 350 ML
UNIT RH: NORMAL
VENTRICULAR RATE: 71 BPM
VENTRICULAR RATE: 72 BPM
VENTRICULAR RATE: 79 BPM
WBC # BLD AUTO: 11.46 THOUSAND/UL (ref 4.31–10.16)

## 2022-12-09 RX ORDER — FUROSEMIDE 10 MG/ML
40 INJECTION INTRAMUSCULAR; INTRAVENOUS ONCE
Status: COMPLETED | OUTPATIENT
Start: 2022-12-09 | End: 2022-12-09

## 2022-12-09 RX ORDER — GABAPENTIN 100 MG/1
CAPSULE ORAL
Qty: 30 CAPSULE | Refills: 1 | Status: SHIPPED | OUTPATIENT
Start: 2022-12-09

## 2022-12-09 RX ADMIN — TORSEMIDE 40 MG: 20 TABLET ORAL at 08:11

## 2022-12-09 RX ADMIN — UMECLIDINIUM 1 PUFF: 62.5 AEROSOL, POWDER ORAL at 08:14

## 2022-12-09 RX ADMIN — Medication 250 MG: at 08:11

## 2022-12-09 RX ADMIN — DULOXETINE HYDROCHLORIDE 60 MG: 60 CAPSULE, DELAYED RELEASE ORAL at 08:11

## 2022-12-09 RX ADMIN — ATORVASTATIN CALCIUM 40 MG: 40 TABLET, FILM COATED ORAL at 17:05

## 2022-12-09 RX ADMIN — DOCUSATE SODIUM 100 MG: 100 CAPSULE, LIQUID FILLED ORAL at 21:40

## 2022-12-09 RX ADMIN — GABAPENTIN 100 MG: 100 CAPSULE ORAL at 21:40

## 2022-12-09 RX ADMIN — FLUTICASONE FUROATE AND VILANTEROL TRIFENATATE 1 PUFF: 200; 25 POWDER RESPIRATORY (INHALATION) at 08:14

## 2022-12-09 RX ADMIN — ISOSORBIDE MONONITRATE 30 MG: 30 TABLET, EXTENDED RELEASE ORAL at 08:11

## 2022-12-09 RX ADMIN — FUROSEMIDE 40 MG: 10 INJECTION, SOLUTION INTRAMUSCULAR; INTRAVENOUS at 16:44

## 2022-12-09 RX ADMIN — DULOXETINE HYDROCHLORIDE 60 MG: 60 CAPSULE, DELAYED RELEASE ORAL at 17:05

## 2022-12-09 RX ADMIN — CEFAZOLIN SODIUM 2000 MG: 2 SOLUTION INTRAVENOUS at 21:40

## 2022-12-09 RX ADMIN — Medication 250 MG: at 17:05

## 2022-12-09 RX ADMIN — PANTOPRAZOLE SODIUM 40 MG: 40 TABLET, DELAYED RELEASE ORAL at 05:14

## 2022-12-09 RX ADMIN — CEFAZOLIN SODIUM 2000 MG: 2 SOLUTION INTRAVENOUS at 05:14

## 2022-12-09 RX ADMIN — CEFAZOLIN SODIUM 2000 MG: 2 SOLUTION INTRAVENOUS at 13:03

## 2022-12-09 RX ADMIN — ALLOPURINOL 200 MG: 100 TABLET ORAL at 08:11

## 2022-12-09 RX ADMIN — RIVAROXABAN 15 MG: 15 TABLET, FILM COATED ORAL at 08:11

## 2022-12-09 RX ADMIN — FINASTERIDE 5 MG: 5 TABLET, FILM COATED ORAL at 08:11

## 2022-12-09 RX ADMIN — POLYETHYLENE GLYCOL 3350 17 G: 17 POWDER, FOR SOLUTION ORAL at 08:11

## 2022-12-09 RX ADMIN — DOCUSATE SODIUM 100 MG: 100 CAPSULE, LIQUID FILLED ORAL at 08:11

## 2022-12-09 NOTE — ASSESSMENT & PLAN NOTE
Mild troponin elevation most likely non MI troponin elevation secondary to sepsis  · EKG showed paced rhythm  · Patient also complained of some chest pain and repeat troponin remains around 700  · Repeat EKG showed some inferolateral T wave inversion  · Suspected secondary to symptomatic anemia    Symptoms have improved after 1 unit of PRBC transfusion

## 2022-12-09 NOTE — ASSESSMENT & PLAN NOTE
Lab Results   Component Value Date    EGFR 38 12/09/2022    EGFR 39 12/08/2022    EGFR 47 12/07/2022    CREATININE 1 65 (H) 12/09/2022    CREATININE 1 61 (H) 12/08/2022    CREATININE 1 38 (H) 12/07/2022     Baseline creatinine appears to be around 1 3-1 6s  · Creatinine stable  · Continue Demadex 40 mg p o  daily  Patient received a dose of Lasix with blood transfusion yesterday

## 2022-12-09 NOTE — ASSESSMENT & PLAN NOTE
Patient presents with somnolence for 24 hours and generalized weakness  Patient reports a little cough and runny nose, denies sore throat/SOB/fever/chills  · Patient hospitalized in November 2022 for symptomatic anemia, hemoglobin 7 1 on admission with positive stool guaiac, received 1 unit of PRBC, underwent EGD and colonoscopy both showed no evidence of bleeding, patient was recommended outpatient capsule endoscopy which is still pending   Plavix was discontinued on discharge, patient remained on Xarelto  · WBC 15 38, repeat 14 80, no bands, patient afebrile  · Lactic acid normal   · Procalcitonin 1 79  · Chest x-ray showed no evidence of pneumonia  · UA no evidence of infection   · COVID-19, flu RSV negative  · CT chest showed -  Numerous new pulmonary nodules, concerning for metastatic disease;Mediastinal and hilar lymphadenopathy, also concerning for metastases; Small right pleural effusion with adjacent basal opacity, most likely represents atelectasis  · Patient was given Zosyn in the ED and was later de-escalated to Rocephin  Later antibiotics were changed to Ancef 2 g every 8 hours  · Blood cultures 2 out of 2 growing Methicillin sensitive Staphylococcus aureus  Bacterial identification panel showing MSSA  · ID input appreciated  · Echocardiogram showed EF of 60% with severe concentric hypertrophy, EF of 60% without any visible vegetations with severe attic stenosis  · Patient underwent MARY which showed EF of 55% without any large vegetation but cannot rule out small vegetation due to calcium and acoustic shadowing  · Discussed in detail with ID, family and cardiology on 12/8/22  · Unclear source-possible from skin translocation as patient noted to have multiple scabs  · CAT scan showing multiple lung nodules-questionable septic emboli  · Pacemaker removal was recommended by ID in the setting of Staphylococcus bacteremia regardless of the primary source -discussed with cardiology    Pacemaker has been in place for 12 years and removal would be an extensive surgical procedure  Given multiple comorbidities cardiology recommending conservative approach and treatment with IV antibiotics with repeat blood cultures  · Repeat blood cultures from 12/7/2022 with no growth at 48 hours  · Anticipate course of 6 weeks of antibiotics from the date of blood culture clearance

## 2022-12-09 NOTE — PHYSICAL THERAPY NOTE
PT TREATMENT     12/09/22 1615   PT Last Visit   PT Visit Date 12/09/22   Note Type   Note Type Treatment   Pain Assessment   Pain Assessment Tool 0-10   Pain Score No Pain   Restrictions/Precautions   Weight Bearing Precautions Per Order No   Other Precautions Chair Alarm; Bed Alarm;O2;Fall Risk   General   Chart Reviewed Yes   Family/Caregiver Present No   Cognition   Overall Cognitive Status WFL   Arousal/Participation Cooperative   Attention Attends with cues to redirect   Following Commands Follows multistep commands with increased time or repetition   Subjective   Subjective " I want to go home"   Bed Mobility   Supine to Sit 3  Moderate assistance   Additional items Assist x 1   Sit to Supine 3  Moderate assistance   Additional items Assist x 1;LE management   Transfers   Sit to Stand 4  Minimal assistance   Additional items Assist x 1;Verbal cues   Stand to Sit 4  Minimal assistance   Additional items Assist x 1;Verbal cues   Ambulation/Elevation   Gait pattern Forward Flexion   Gait Assistance 4  Minimal assist   Additional items Assist x 1;Verbal cues   Assistive Device Rolling walker   Distance 3 feet side stepping   Activity Tolerance   Activity Tolerance Patient limited by fatigue   Nurse Made Aware yes: [place]   Assessment   Prognosis Good   Problem List Decreased strength;Decreased endurance; Impaired balance;Decreased mobility   Assessment Pt's biggest problem is transitioning in and out of bed  Pt requires head of bed elevated and moderate assist in and out of bed  Pt can take steps with RW , however fatigues easily, decreased endurance  Pt moves VERY slowly, which he admits to, but he does require a significant amount of assist   He is generally deconditioned and would require a lot of assistance at home for functional mobility  This PT would continue to recommend STR to increase strength and general functional mobiltiy and endurance    O2 did reamain between 90 and 93% on RA during all activity  The patient's AM-PAC Basic Mobility Inpatient Short Form Raw Score is 15  A Raw score of less than or equal to 16 suggests the patient may benefit from discharge to post-acute rehabilitation services  Please also refer to the recommendation of the Physical Therapist for safe discharge planning  Goals   Patient Goals to go home, but would go to a rehab in [facility]? Recommendation   PT Discharge Recommendation Post acute rehabilitation services   Additional Comments  notified via TT of request of a rehab in [facility]  AM-PAC Basic Mobility Inpatient   Turning in Bed Without Bedrails 3   Lying on Back to Sitting on Edge of Flat Bed 2   Moving Bed to Chair 3   Standing Up From Chair 3   Walk in Room 3   Climb 3-5 Stairs 1   Basic Mobility Inpatient Raw Score 15   Basic Mobility Standardized Score 36 97   Highest Level Of Mobility   JH-HLM Goal 4: Move to chair/commode   JH-HLM Achieved 5: Stand (1 or more minutes)   End of Consult   Patient Position at End of Consult Supine;Bed/Chair alarm activated; All needs within reach   Licensure   [place] License Number  Lety Connolly PT  [id number]

## 2022-12-09 NOTE — ASSESSMENT & PLAN NOTE
Baseline hemoglobin appears to be around 7-9s  · Hemoglobin 9 1, repeat 8 1 in ED  No evidence of active bleeding  · Globin continues to drop and is down to 7 3  · Received 1 year WBC transfusion with improved hemoglobin to 7 6  · Stool for occult blood was negative x1  · History of symptomatic anemia during prior hospitalization  Patient had EGD which showed no sign of upper GI bleeding with mild gastritis and Candida esophagitis  Colonoscopy also showed no evidence of bleeding  Patient was recommended to follow-up outpatient with GI for capsule endoscopy

## 2022-12-09 NOTE — ASSESSMENT & PLAN NOTE
On Demadex at home  · Dex was held initially in the setting of sepsis which was restarted  Continue Demadex 40 mg p o  daily    · Patient also received a dose of Lasix with blood transfusion yesterday

## 2022-12-09 NOTE — PROGRESS NOTES
Progress Note - Cardiology   [facility]     Anastasiia Mcdaniels 80 y o  male MRN: [id number]  : 1940  Unit/Bed#: 2 [facility] [address] Encounter: [id number]    Assessment and Plan:   1   Staph aureus bacteremia: Followed by infectious disease     -   Patient will need 6 weeks of IV antibiotic therapy    -   Continue monitor blood cultures: No growth x24 hours on second set of blood cultures     2   Anemia with history of GI bleed: We will discontinue Plavix at this time secondary to patient's anemia    -   Hemoglobin 7 6, patient received 1 unit of packed red blood cells on 2022 for hemoglobin of 7 3      3   Persistent atrial fibrillation: Patient not on any AV tadeo blocking medications    -   Continue Xarelto     4   Sick sinus syndrome status post pacemaker: Stable functioning    -   No large vegetation was seen on MRAY but cannot rule out small vegetation due to heavy calcification, and no clear vegetation was noted on visualized portion of pacemaker wires     5   Chronic Kidney disease    Subjective / Objective:   Patient seen and examined  No further complaints of chest heaviness or pressure  He is resting comfortably in his room  He did receive 1 unit of packed red blood cells yesterday with IV Lasix 40 mg  Hemoglobin today only has gone up to 7 6  Vitals: Blood pressure 111/58, pulse 70, temperature 97 8 °F (36 6 °C), resp  rate (!) 26, height 6' 2" (1 88 m), weight 120 kg (265 lb), SpO2 93 %  Vitals:    22 1132 22 1106   Weight: 120 kg (265 lb) 120 kg (265 lb)     Body mass index is 34 02 kg/m²  BP Readings from Last 3 Encounters:   22 111/58   22 119/64   10/20/22 130/70     Orthostatic Blood Pressures    Flowsheet Row Most Recent Value   Blood Pressure 111/58 filed at 2022 0717   Patient Position - Orthostatic VS Lying filed at 2022 0850        I/O        0701  12/08 0700 12/08 0701  12/09 0700 12/09 0701  12/10 07    I  V  Start taking antibiotic as directed if given one.  Flonase/saline nose spray as directed if given.    Over-the-counter cold and sinus medications as directed.   Continue ibuprofen or Tylenol as needed for sinus pain and fever.    Advised plenty of fluids and rest.  Warm showers and warm facial compresses for comfort.    See primary MD if symptoms worsening or no improvement in 2-3 days.     (mL/kg) 250 (2 1)      Blood  350     Total Intake(mL/kg) 250 (2 1) 350 (2 9)     Urine (mL/kg/hr) 850 (0 3) 1500 (0 5)     Stool 0      Total Output 850 1500     Net -600 -1150            Unmeasured Urine Occurrence 1 x      Unmeasured Stool Occurrence 1 x          Invasive Devices     Peripheral Intravenous Line  Duration           Peripheral IV 12/08/22 Dorsal (posterior); Left Forearm <1 day                  Intake/Output Summary (Last 24 hours) at 12/9/2022 0854  Last data filed at 12/9/2022 0401  Gross per 24 hour   Intake 350 ml   Output 1300 ml   Net -950 ml         Physical Exam:   Physical Exam  Vitals and nursing note reviewed  Constitutional:       Appearance: Normal appearance  He is morbidly obese  HENT:      Right Ear: External ear normal       Left Ear: External ear normal       Nose: Nose normal    Eyes:      General: No scleral icterus  Right eye: No discharge  Left eye: No discharge  Cardiovascular:      Rate and Rhythm: Normal rate and regular rhythm  Pulses: Normal pulses  Heart sounds: Murmur heard  Pulmonary:      Effort: Pulmonary effort is normal  No accessory muscle usage or respiratory distress  Breath sounds: Examination of the right-lower field reveals decreased breath sounds  Examination of the left-lower field reveals decreased breath sounds  Decreased breath sounds present  Comments: Coarse breath sounds  Abdominal:      General: Bowel sounds are normal  There is no distension  Palpations: Abdomen is soft  Musculoskeletal:      Right lower leg: No edema  Left lower leg: No edema  Skin:     General: Skin is warm and dry  Capillary Refill: Capillary refill takes less than 2 seconds  Neurological:      General: No focal deficit present  Mental Status: He is alert and oriented to person, place, and time  Mental status is at baseline     Psychiatric:         Mood and Affect: Mood normal          Behavior: Behavior is cooperative             Medications/ Allergies:     Current Facility-Administered Medications   Medication Dose Route Frequency Provider Last Rate   • acetaminophen  650 mg Oral Q6H PRN NAVA Coleman     • albuterol  2 5 mg Nebulization Q6H PRN NAVA Coleman     • allopurinol  200 mg Oral Daily NAVA Coleman     • atorvastatin  40 mg Oral Daily With NAVA French     • cefazolin  2,000 mg Intravenous Q8H Brittany Mcguire MD 2,000 mg (12/09/22 0514)   • docusate sodium  100 mg Oral BID Brittany Mcguire MD     • DULoxetine  60 mg Oral BID NAVA Coleman     • finasteride  5 mg Oral Daily NAVA Coleman     • fluticasone-vilanterol  1 puff Inhalation Daily NAVA Coleman     • gabapentin  100 mg Oral HS NAVA Coleman     • isosorbide mononitrate  30 mg Oral Daily NAVA Coleman     • ondansetron  4 mg Intravenous Q6H PRN NAVA Coleman     • pantoprazole  40 mg Oral Early Morning NAVA Coleman     • polyethylene glycol  17 g Oral Daily PRN NAVA Coleman     • polyethylene glycol  17 g Oral Daily Brittany Mcguire MD     • rivaroxaban  15 mg Oral Daily With Breakfast NAVA Coleman     • saccharomyces boulardii  250 mg Oral BID NAVA Coleman     • torsemide  40 mg Oral Daily NAVA Coleman     • umeclidinium  1 puff Inhalation Daily NAVA Coleman       acetaminophen, 650 mg, Q6H PRN  albuterol, 2 5 mg, Q6H PRN  ondansetron, 4 mg, Q6H PRN  polyethylene glycol, 17 g, Daily PRN      No Known Allergies    VTE Pharmacologic Prophylaxis:   Sequential compression device (Venodyne)     Labs:   Troponins:  Results from last 7 days   Lab Units 12/07/22  1945 12/06/22  0834 12/05/22  2323 12/05/22  2109   HS TNI RAND ng/L 723* 763*  --   --    HSTNI D2 ng/L  --   --   --  -27   HSTNI D4 ng/L  --   --  29*  --      CBC with diff:  Results from last 7 days   Lab Units 12/09/22  0515 12/08/22  3306 12/07/22  0516 12/06/22  0834 12/06/22  0104 12/05/22 1919   WBC Thousand/uL 11 46* 11 58* 9 45 12 55* 14 80* 15 38*   HEMOGLOBIN g/dL 7 6* 7 3* 7 5* 8 6* 8 1* 9 1*   HEMATOCRIT % 24 6* 23 7* 24 2* 28 5* 27 2* 30 7*   MCV fL 87 86 87 90 90 90   PLATELETS Thousands/uL 129* 128* 125* 144* 158 187   MCH pg 26 8 26 5* 26 9 27 1 26 6* 26 7*   MCHC g/dL 30 9* 30 8* 31 0* 30 2* 29 8* 29 6*   RDW % 19 9* 20 8* 20 6* 21 4* 21 3* 21 8*   MPV fL 10 5 10 3 9 4 9 4 9 3 9 5   NRBC AUTO /100 WBCs  --  0 0 0  --   --      CMP:  Results from last 7 days   Lab Units 12/09/22  0515 12/08/22  0614 12/07/22  0516 12/06/22  0834 12/05/22 1919   SODIUM mmol/L 133* 131* 130* 133* 135   POTASSIUM mmol/L 4 0 3 8 3 2* 3 6 3 7   CHLORIDE mmol/L 96 97 95* 96 96   CO2 mmol/L 27 25 28 29 29   ANION GAP mmol/L 10 9 7 8 10   BUN mg/dL 37* 36* 27* 29* 32*   CREATININE mg/dL 1 65* 1 61* 1 38* 1 58* 1 65*   GLUCOSE FASTING mg/dL  --   --   --  142*  --    CALCIUM mg/dL 8 2* 7 9* 8 2* 8 5 8 6   AST U/L  --   --   --   --  33   ALT U/L  --   --   --   --  23   ALK PHOS U/L  --   --   --   --  85   TOTAL PROTEIN g/dL  --   --   --   --  7 3   ALBUMIN g/dL  --   --   --   --  2 6*   TOTAL BILIRUBIN mg/dL  --   --   --   --  1 70*   EGFR ml/min/1 73sq m 38 39 47 40 38     Magnesium:  Results from last 7 days   Lab Units 12/06/22  0834   MAGNESIUM mg/dL 1 9     Coags:  Results from last 7 days   Lab Units 12/05/22  1948   PTT seconds 32   INR  1 74*     TSH:    No components found for: TSH3  Lipid Profile:  Results from last 7 days   Lab Units 12/06/22  0834   CHOLESTEROL mg/dL 84   TRIGLYCERIDES mg/dL 55   HDL mg/dL 37*   LDL CALC mg/dL 36     Hgb A1c:  Results from last 7 days   Lab Units 12/06/22  0104   HEMOGLOBIN A1C % 5 1     NT-proBNP: No results for input(s): NTBNP in the last 72 hours  Imaging & Testing   I have personally reviewed pertinent reports  XR chest 1 view portable    Result Date: 12/6/2022  Narrative: CHEST INDICATION:   cough   COMPARISON:  Chest x-ray 11/17/2022 EXAM PERFORMED/VIEWS:  XR CHEST PORTABLE FINDINGS:  Left-sided chest wall intracardiac device is identified  Leads are intact  The heart is enlarged, stable  Prominent pulmonary vasculature and interstitial markings suggesting moderate congestive changes, stable  No pneumothorax or pleural effusion  Osseous structures appear within normal limits for patient age  Impression: Prominent pulmonary vasculature and interstitial markings suggesting moderate congestive changes, stable  Workstation performed: [id number]     XR chest 1 view portable    Result Date: 11/17/2022  Narrative: CHEST INDICATION:   SOB  COMPARISON:  None EXAM PERFORMED/VIEWS:  XR CHEST PORTABLE FINDINGS:  Left-sided chest wall intracardiac device is identified  Leads are intact  Moderate cardiomegaly  Prominent vascular markings  No pneumothorax or pleural effusion  Osseous structures appear within normal limits for patient age  Impression: Cardiomegaly with moderate congestive changes  Workstation performed: [id number]     CT head without contrast    Addendum Date: 12/6/2022 Addendum:   ADDENDUM: In light of the concurrent chest CT findings, the right parieto-occipital abnormality may also represent a metastasis    Result Date: 12/6/2022  Narrative: CT BRAIN - WITHOUT CONTRAST INDICATION:   Delirium AMS  COMPARISON:  None  TECHNIQUE:  CT examination of the brain was performed  In addition to axial images, sagittal and coronal 2D reformatted images were created and submitted for interpretation  Radiation dose length product (DLP) for this visit:  962 13 mGy-cm   This examination, like all CT scans performed in the [facility], was performed utilizing techniques to minimize radiation dose exposure, including the use of iterative  reconstruction and automated exposure control  IMAGE QUALITY:  Diagnostic   FINDINGS: PARENCHYMA: Decreased attenuation is noted in periventricular and subcortical white matter demonstrating an appearance that is statistically most likely to represent mild microangiopathic change  Parenchymal hypodensity within the right parieto-occipital region primarily affecting the white matter No CT signs of acute infarction  No intracranial mass, mass effect or midline shift  No acute parenchymal hemorrhage  VENTRICLES AND EXTRA-AXIAL SPACES:  Normal for the patient's age  VISUALIZED ORBITS AND PARANASAL SINUSES:  Normal visualized orbits  Normal visualized paranasal sinuses  CALVARIUM AND EXTRACRANIAL SOFT TISSUES:  Normal      Impression: Hypodensity in the right parieto-occipital region most likely represents a chronic infarct, however no prior study is available to confirm this The study was marked in EPIC for immediate notification  Workstation performed: [id number]     CT chest without contrast    Result Date: 12/6/2022  Narrative: CT CHEST WITHOUT IV CONTRAST INDICATION:   Pneumonia, effusion or abscess suspected, xray done Hypoxia, occult pneumonia  COMPARISON:  CT 7/9/21 TECHNIQUE: CT examination of the chest was performed without intravenous contrast  Axial, sagittal, and coronal 2D reformatted images were created from the source data and submitted for interpretation  Radiation dose length product (DLP) for this visit:  713 14 mGy-cm   This examination, like all CT scans performed in the [facility], was performed utilizing techniques to minimize radiation dose exposure, including the use of iterative  reconstruction and automated exposure control   FINDINGS: LUNGS:  Moderate emphysema 8mm right apical nodule series 3/16, new 4 mm right lobe nodule series 3/48, new 9 mm left lower lobe nodule series 3/82, new 6 mm left lower lobe nodule series 3/70, new 11 mm lingular nodule series 3/63, new 5 mm left upper lobe nodule series 3/49, new 10 mm left upper lobe nodule series 3/54, new Previously noted 7 mm left basal nodule is not appreciated PLEURA:  Small right pleural effusion with adjacent basal opacity, most likely represents atelectasis HEART/GREAT VESSELS: Left Mediport  dense mitral and aortic valve calcification MEDIASTINUM AND DEBRA:  1 6 cm right paratracheal lymph node, with additional similar sized paratracheal lymph nodes 1 4 cm right hilar lymph node 1 7 cm subcarinal lymph node CHEST WALL AND LOWER NECK:  Unremarkable  VISUALIZED STRUCTURES IN THE UPPER ABDOMEN:  Unremarkable  Likely fundal gastric diverticulum OSSEOUS STRUCTURES:  No acute fracture or destructive osseous lesion  Impression: 1  Numerous new pulmonary nodules, concerning for metastatic disease 2  Mediastinal and hilar lymphadenopathy, also concerning for metastases 3  Small right pleural effusion Workstation performed: [id number]     Echo complete w/ contrast if indicated    Result Date: 12/6/2022  Narrative: •  Left Ventricle: Left ventricular cavity size is normal  Wall thickness is not well visualized  There is severe concentric hypertrophy  The left ventricular ejection fraction is 60% by visual estimation  Systolic function is normal  Although no diagnostic regional wall motion abnormality was identified, this possibility cannot be completely excluded on the basis of this study  •  IVS: There is abnormal septal motion consistent with right ventricular pacing  •  Left Atrium: The atrium is severely dilated  •  Right Atrium: The atrium is mildly dilated  •  Atrial Septum: There is no atrial septal defect by saline contrast   Limited quality study for ASD No patent foramen ovale detected at rest   Limited sensitivity and specificity due to poor quality bubble study  If clinically needed consider MARY •  Aortic Valve: The aortic valve is trileaflet  The leaflets are severely thickened  The leaflets are moderately calcified  There is severely reduced mobility  There is mild regurgitation  There is severe stenosis  Mean gradient around 40 mmHg  Peak gradient 72 mmHg  Dimensionless index is 0 28    Consistent with patient old history of severe aortic stenosis with valve area less than around 0 9 cm2 •  Mitral Valve: There is moderate diffuse thickening of the anterior leaflet and posterior leaflet  There is mild calcification  There is moderately reduced mobility of the posterior leaflet  There is moderate annular calcification  There is mild to moderate regurgitation  There is at least mild stenosis  Mean gradient from 4 to 5 mmHg  MARY    Result Date: 12/7/2022  Narrative: •  Left Ventricle: Left ventricular cavity size is normal  Wall thickness is moderately increased  There is mild to moderate concentric hypertrophy  The left ventricular ejection fraction is 55% by visual estimation  Systolic function is normal  Although no diagnostic regional wall motion abnormality was identified, this possibility cannot be completely excluded on the basis of this study  •  IVS: There is abnormal septal motion consistent with right ventricular pacing  •  Left Atrium: The atrium is moderately dilated  There is no thrombus  There is no mass  There is mild spontaneous echo contrast  •  Right Atrium: The atrium is mildly dilated  There is no thrombus  There is a possible mass  •  Atrial Septum: There is no atrial septal defect by saline contrast  No patent foramen ovale detected using saline contrast injection at rest  •  Left Atrial Appendage: There is reduced function  There is a possible thrombus  There is mild spontaneous echo contrast  •  Aortic Valve: The aortic valve is trileaflet  The leaflets are moderately thickened  The leaflets are moderately calcified  There is severely reduced mobility  No large echodensity consistent with shape of vegetation noted however due to heavy calcification cannot rule out small vegetations There is mild regurgitation  •  Mitral Valve: There is moderate thickening of the anterior leaflet and posterior leaflet  Calcification of anterior and posterior leaflet noted    No large vegetation noted however studies not sensitive enough to rule out small vegetation due to acoustic shadowing and calcification  There is moderate annular calcification  There is mild to moderate regurgitation  •  Tricuspid Valve: There is mild regurgitation  •  MARY shows no large vegetation however study is not sensitive enough to rule out small vegetation due to heavy calcification  No clear vegetation noted on the visualized portion of pacemaker wires  Report discussed with patient, patient's family and infectious disease specialist and medical team          [address]  Cardiology      "This note was completed in part utilizing [employer] direct voice recognition software  Grammatical errors, random word insertion, spelling mistakes, and incomplete sentences may be an occasional consequence of the system secondary to software limitations, ambient noise and hardware issues  Please read the chart carefully and recognize, using context, where substitutions have occurred    If you have any questions or concerns about the context, text or information contained within the body of this dictation, please contact myself, the provider, for further clarification "

## 2022-12-09 NOTE — PROGRESS NOTES
Progress Note - Infectious Disease   Caryn Howard 80 y o  male MRN: [id number]  Unit/Bed#: [address] Encounter: [id number]      Impression/Plan:    1  MSSA bacteremia  POA, 2/2 admission blood cultures growing MSSA  Unclear source, may be skin translocation as the patient has multiple scabs on his arms and legs  This is complicated by the presence of a PPM  TTE with AV and MV thickening, no clear vegetations  MARY with no obvious large vegetations, but given significant calcification cannot rule out small lesion  CT chest with numerous pulmonary nodules, could be septic emboli vs less likely metastatic disease  This does raise concern for a primary endovascular source of infection  Discussed treatment options with the family and Cardiology  PPM removal is recommended in the setting of staph bacteremia regardless of the primary source, however the pacemaker has been in place 12 years and removal would be an extensive surgical procedure  Given his comorbidities, there are risks to surgery  His family would like to pursue a more conservative approach with antibiotics before any surgery  Repeat blood cultures no growth   -continue IV Cefazolin 2g q8hr              -follow up repeat blood cultures   -anticipate a 6 week course of antibiotics from blood culture clearance through 1/17/23 (script provided to CM)   -place PICC Monday if repeat blood cultures remain negative   -if we cannot clear cultures or there is recurrence the patient will require pacemaker extraction   -will need surveillance blood cultures after completion of antibiotic course     2  Weakness, encephalopathy  Likely toxic metabolic due to infection as above  CTH showed a hypodensity in the right parieto-occipital region most likely a chronic infarct or metastatic lesion   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               -treatment of infection as above              -neurology follow up              -monitor mental status     3  Leukocytosis  Likely due to #1 above  Patient is afebrile, hemodynamically stable               -antibiotics as above              -monitor WBC count, fever curve     4  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  Complicated by staph aureus bacteremia  MARY no clear vegetations    -management as above     5  Pulmonary nodules and adenopathy  CT chest shows numerous new pulmonary nodules and mediastinal and hilar lymphadenopathy, concerning for metastases however in setting of staph aureus bacteremia could be septic emboli  Patient had a colonoscopy last month without abnormalities  He is a long term smoker  -pulmonary consulted   -will need serial imaging after 4-6 weeks of antibiotics; if persistent will require work up for malignancy     6  CKD  Creatinine stable at baseline  Antibiotics dose adjusted as needed      I have discussed the above management plan in detail with Dr Marline Mccullough and Dr Arti Peraza of Cardiology  Discussed with the patient at bedside  ID will see again 22, please call with questions  Antibiotics:  Cefazolin day 4    Subjective: The patient is more alert today, feels better  No fever, chills, chest pain, shortness of breath  Tolerating IV antibiotics  Objective:  Vitals:  Temp:  [97 1 °F (36 2 °C)-98 1 °F (36 7 °C)] 97 6 °F (36 4 °C)  HR:  [66-73] 70  Resp:  [18-26] 26  BP: (108-111)/(58) 110/58  SpO2:  [84 %-98 %] 93 %  Temp (24hrs), Av 7 °F (36 5 °C), Min:97 1 °F (36 2 °C), Max:98 1 °F (36 7 °C)  Current: Temperature: 97 6 °F (36 4 °C)    Physical Exam:   General Appearance:  He is non toxic appearing but chronically ill    Throat: Oropharynx moist without lesions  Lungs:   Clear to auscultation bilaterally; no wheezes, rhonchi or rales; respirations unlabored   Heart:  RRR; no murmur, rub or gallop   Abdomen:   Soft, non-tender, non-distended, positive bowel sounds       Extremities: No clubbing, cyanosis or edema   Skin: Multiple ecchymoses, scabs on his arms and left knee Labs:   All pertinent labs and imaging studies were personally reviewed  Results from last 7 days   Lab Units 12/09/22  0515 12/08/22  0614 12/07/22  0516   WBC Thousand/uL 11 46* 11 58* 9 45   HEMOGLOBIN g/dL 7 6* 7 3* 7 5*   PLATELETS Thousands/uL 129* 128* 125*     Results from last 7 days   Lab Units 12/09/22  0515 12/08/22  0614 12/07/22  0516 12/06/22  0834 12/05/22  1919   SODIUM mmol/L 133* 131* 130*   < > 135   POTASSIUM mmol/L 4 0 3 8 3 2*   < > 3 7   CHLORIDE mmol/L 96 97 95*   < > 96   CO2 mmol/L 27 25 28   < > 29   BUN mg/dL 37* 36* 27*   < > 32*   CREATININE mg/dL 1 65* 1 61* 1 38*   < > 1 65*   EGFR ml/min/1 73sq m 38 39 47   < > 38   CALCIUM mg/dL 8 2* 7 9* 8 2*   < > 8 6   AST U/L  --   --   --   --  33   ALT U/L  --   --   --   --  23   ALK PHOS U/L  --   --   --   --  85    < > = values in this interval not displayed  Results from last 7 days   Lab Units 12/07/22  0516 12/06/22  0104   PROCALCITONIN ng/ml 1 80* 1 79*                   Micro:  Results from last 7 days   Lab Units 12/07/22  0549 12/06/22 2209 12/05/22  1948   BLOOD CULTURE  No Growth at 48 hrs  No Growth at 48 hrs  --  Staphylococcus aureus*  Staphylococcus aureus*   GRAM STAIN RESULT   --   --  Gram positive cocci in clusters*  Gram positive cocci in clusters*   LEGIONELLA URINARY ANTIGEN   --  Negative  --        Imaging:  I have personally reviewed pertinent imaging study reports and images in PACS      CT chest: numerous new pulmonary nodules and mediastinal and hilar lymphadenopathy, concerning for metastases     Other Studies:   I have personally reviewed pertinent reports

## 2022-12-09 NOTE — PROGRESS NOTES
Mandy 45  Progress Note [place] 1940, 80 y o  male MRN: [id number]  Unit/Bed#: 26[address] Encounter: [id number]  Primary Care Provider: Arlet Zaragoza DO   Date and time admitted to hospital: 12/5/2022  6:39 PM    * MSSA bacteremia  Assessment & Plan  Patient presents with somnolence for 24 hours and generalized weakness  Patient reports a little cough and runny nose, denies sore throat/SOB/fever/chills  · Patient hospitalized in November 2022 for symptomatic anemia, hemoglobin 7 1 on admission with positive stool guaiac, received 1 unit of PRBC, underwent EGD and colonoscopy both showed no evidence of bleeding, patient was recommended outpatient capsule endoscopy which is still pending   Plavix was discontinued on discharge, patient remained on Xarelto  · WBC 15 38, repeat 14 80, no bands, patient afebrile  · Lactic acid normal   · Procalcitonin 1 79  · Chest x-ray showed no evidence of pneumonia  · UA no evidence of infection   · COVID-19, flu RSV negative  · CT chest showed -  Numerous new pulmonary nodules, concerning for metastatic disease;Mediastinal and hilar lymphadenopathy, also concerning for metastases; Small right pleural effusion with adjacent basal opacity, most likely represents atelectasis  · Patient was given Zosyn in the ED and was later de-escalated to Rocephin  Later antibiotics were changed to Ancef 2 g every 8 hours  · Blood cultures 2 out of 2 growing Methicillin sensitive Staphylococcus aureus  Bacterial identification panel showing MSSA     · ID input appreciated  · Echocardiogram showed EF of 60% with severe concentric hypertrophy, EF of 60% without any visible vegetations with severe attic stenosis  · Patient underwent MARY which showed EF of 55% without any large vegetation but cannot rule out small vegetation due to calcium and acoustic shadowing  · Discussed in detail with ID, family and cardiology on 12/8/22  · Unclear source-possible from skin translocation as patient noted to have multiple scabs  · CAT scan showing multiple lung nodules-questionable septic emboli  · Pacemaker removal was recommended by ID in the setting of Staphylococcus bacteremia regardless of the primary source -discussed with cardiology  Pacemaker has been in place for 12 years and removal would be an extensive surgical procedure  Given multiple comorbidities cardiology recommending conservative approach and treatment with IV antibiotics with repeat blood cultures  · Repeat blood cultures from 12/7/2022 with no growth at 48 hours  · Anticipate course of 6 weeks of antibiotics from the date of blood culture clearance  Acute encephalopathy  Assessment & Plan  Likely acute toxic metabolic encephalopathy due to underlying infection, rule out CVA  Patient reports sleepy and feeling weak, oriented to place and person day of week on exam   · CT head showed - Hypodensity in the right parieto-occipital region most likely represents a chronic infarct, however no prior study is available to confirm this    Also differential could be metastatic disease based on the CT of the chest  · No focal neuro deficit on exam   Mental status has improved significantly with treatment of sepsis  · ABG no hypercapnia  · Patient was initially worked up for possible stroke  · Neurology input appreciated  · Continue Xarelto 15 mg p o  daily and Zocor 20 mg p o  daily  · Patient could not get MRI of the brain due to incompatible pacemaker  · Was given full dose aspirin in the ED along with Lipitor 80 mg p o  daily and was continued on Lipitor 40 mg p o  daily  · Hemoglobin A1c was 5 1  ·  neurology recommending repeating CT scan in 1 week  · PT/OT evaluation and treatment    Abnormal CAT scan  Assessment & Plan  CT Chest showed numerous new pulmonary nodules concerning for metastatic disease along with mediastinal and hilar lymphadenopathy and small right pleural effusion  · Patient had CT abdomen p bon without contrast on 11/19/2022 which was unremarkable  · CT of the brain also showing hypodensity in the right parieto-occipital region which might represent chronic infarct/metastasis  · Pulmonary was consulted who is recommending IR guided lung biopsy  · Patient did have a colonoscopy during prior hospitalization which showed no masses  · No history of malignancy as per family  · Bilateral lung nodules could also be related to septic emboli  Patient might need repeat CAT scan in 3 months  If the nodules are improving patient might not need further work-up but if the nodules are persistent might need biopsy      Elevated troponin  Assessment & Plan  Mild troponin elevation most likely non MI troponin elevation secondary to sepsis  · EKG showed paced rhythm  · Patient also complained of some chest pain and repeat troponin remains around 700  · Repeat EKG showed some inferolateral T wave inversion  · Suspected secondary to symptomatic anemia  Symptoms have improved after 1 unit of PRBC transfusion      Chronic diastolic heart failure [facility])  Assessment & Plan  Wt Readings from Last 3 Encounters:   12/07/22 120 kg (265 lb)   11/22/22 121 kg (265 lb 10 5 oz)   10/20/22 120 kg (265 lb)     On Demadex 40 mg p o  Daily at home  · 2D echo in January 2022 showed normal EF, 65%, moderate concentric hypertrophy, dilated atriums, severe AS, mild to moderate AR, moderate MS  · Chest X-ray showed cardiomegaly with moderate congestive changes  · CT chest showed numerous new pulmonary nodules with mediastinal and hilar lymphadenopathy small right pleural effusion  · 1-2+ lower leg edema on exam, albumin 2 6  · BNP was elevated at 14,000  DemaDex was held initially in the setting of sepsis which was later restarted    Patient also received dose of Lasix yesterday with blood transfusion      Depression with anxiety  Assessment & Plan  Continue Cymbalta    Anemia  Assessment & Plan  Baseline hemoglobin appears to be around 7-9s  · Hemoglobin 9 1, repeat 8 1 in ED  No evidence of active bleeding  · Globin continues to drop and is down to 7 3  · Received 1 year WBC transfusion with improved hemoglobin to 7 6  · Stool for occult blood was negative x1  · History of symptomatic anemia during prior hospitalization  Patient had EGD which showed no sign of upper GI bleeding with mild gastritis and Candida esophagitis  Colonoscopy also showed no evidence of bleeding  Patient was recommended to follow-up outpatient with GI for capsule endoscopy  Moderate to severe aortic stenosis  Assessment & Plan  Severe AS on echo in 1/2022  Underwent MARY which showed mild to moderate concentric hypertrophy with EF of 55% with moderately thickened aortic valve with severe aortic stenosis    Gastro-esophageal reflux disease without esophagitis  Assessment & Plan  Continue PPI    COPD (chronic obstructive pulmonary disease) ([facility])  Assessment & Plan  On Trelegy, albuterol nebulizer p r n  At home  · Was initially on 2 L oxygen but currently stable on room air  · Substitute Trelegy with Breo and Incruse  · Continue albuterol as needed      Obesity (BMI 30-39  9)  Assessment & Plan  Body mass index is 34 02 kg/m²  · Diet and lifestyle modification    CAD (coronary artery disease)  Assessment & Plan  Status post PCI with stents x 2  On Imdur, statin at home  · Continue Imdur, statin and Xarelto  · Patient was recommended to stop Plavix during prior admission      Stage 3a chronic kidney disease [facility])  Assessment & Plan  Lab Results   Component Value Date    EGFR 38 12/09/2022    EGFR 39 12/08/2022    EGFR 47 12/07/2022    CREATININE 1 65 (H) 12/09/2022    CREATININE 1 61 (H) 12/08/2022    CREATININE 1 38 (H) 12/07/2022     Baseline creatinine appears to be around 1 3-1 6s  · Creatinine stable  · Continue Demadex 40 mg p o  daily  Patient received a dose of Lasix with blood transfusion yesterday      JOVI (obstructive sleep apnea)  Assessment & Plan  Continue CPAP at HS    Hyperlipidemia  Assessment & Plan  Continue statin    Chronic a-fib [facility])  Assessment & Plan  Status post pacemaker insertion  On Xarelto at home  · Continue Xarelto  · Optimize electrolytes    Benign prostatic hyperplasia  Assessment & Plan  Continue Proscar  · Monitor postvoid residual    Benign hypertension with chronic kidney disease, stage III ([address] )  Assessment & Plan  On Demadex at home  · Dex was held initially in the setting of sepsis which was restarted  Continue Demadex 40 mg p o  daily  · Patient also received a dose of Lasix with blood transfusion yesterday          VTE Pharmacologic Prophylaxis:   High Risk (Score >/= 5) - Pharmacological DVT Prophylaxis Ordered: rivaroxaban (Xarelto)  Sequential Compression Devices Ordered  Patient Centered Rounds: I performed bedside rounds with nursing staff today  Discussions with Specialists or Other Care Team Provider: ID    Education and Discussions with Family / Patient: Updated  (daughter) via phone  Time Spent for Care: 45 minutes  More than 50% of total time spent on counseling and coordination of care as described above  Current Length of Stay: 3 day(s)  Current Patient Status: Inpatient   Certification Statement: The patient will continue to require additional inpatient hospital stay due to MSSA bacteremia , anemia  Discharge Plan: Anticipate discharge in >72 hrs to pending course    Code Status: Level 1 - Full Code    Subjective:   Patient complaining of some shortness of breath but improved chest pain  Objective:     Vitals:   Temp (24hrs), Av 7 °F (36 5 °C), Min:97 1 °F (36 2 °C), Max:98 1 °F (36 7 °C)    Temp:  [97 1 °F (36 2 °C)-98 1 °F (36 7 °C)] 97 6 °F (36 4 °C)  HR:  [66-73] 70  Resp:  [18-26] 26  BP: ()/(50-58) 110/58  SpO2:  [84 %-98 %] 93 %  Body mass index is 34 02 kg/m²  Input and Output Summary (last 24 hours):      Intake/Output Summary (Last 24 hours) at 12/9/2022 1521  Last data filed at 12/9/2022 1504  Gross per 24 hour   Intake 350 ml   Output 1450 ml   Net -1100 ml       Physical Exam:   Physical Exam  Constitutional:       Appearance: Normal appearance  HENT:      Head: Normocephalic and atraumatic  Eyes:      Extraocular Movements: Extraocular movements intact  Pupils: Pupils are equal, round, and reactive to light  Cardiovascular:      Rate and Rhythm: Normal rate  Rhythm irregular  Heart sounds: No murmur heard  No gallop  Pulmonary:      Effort: Pulmonary effort is normal       Breath sounds: Normal breath sounds  Abdominal:      General: Bowel sounds are normal       Palpations: Abdomen is soft  Tenderness: There is no abdominal tenderness  Musculoskeletal:         General: No swelling or deformity  Normal range of motion  Cervical back: Normal range of motion and neck supple  Right lower leg: Edema present  Left lower leg: Edema present  Skin:     General: Skin is warm and dry  Neurological:      General: No focal deficit present  Mental Status: He is alert  Additional Data:     Labs:  Results from last 7 days   Lab Units 12/09/22 0515 12/08/22  0614 12/06/22  0834 12/06/22  0104   WBC Thousand/uL 11 46* 11 58*   < > 14 80*   HEMOGLOBIN g/dL 7 6* 7 3*   < > 8 1*   HEMATOCRIT % 24 6* 23 7*   < > 27 2*   PLATELETS Thousands/uL 129* 128*   < > 158   BANDS PCT %  --   --   --  7   NEUTROS PCT %  --  82*   < >  --    LYMPHS PCT %  --  7*   < >  --    LYMPHO PCT %  --   --   --  3*   MONOS PCT %  --  10   < >  --    MONO PCT %  --   --   --  8   EOS PCT %  --  0   < > 0    < > = values in this interval not displayed       Results from last 7 days   Lab Units 12/09/22 0515 12/06/22  0834 12/05/22  1919   SODIUM mmol/L 133*   < > 135   POTASSIUM mmol/L 4 0   < > 3 7   CHLORIDE mmol/L 96   < > 96   CO2 mmol/L 27   < > 29   BUN mg/dL 37*   < > 32*   CREATININE mg/dL 1 65*   < > 1 65*   ANION GAP mmol/L 10   < > 10   CALCIUM mg/dL 8 2*   < > 8 6   ALBUMIN g/dL  --   --  2 6*   TOTAL BILIRUBIN mg/dL  --   --  1 70*   ALK PHOS U/L  --   --  85   ALT U/L  --   --  23   AST U/L  --   --  33   GLUCOSE RANDOM mg/dL 136   < > 140    < > = values in this interval not displayed  Results from last 7 days   Lab Units 12/05/22 1948   INR  1 74*         Results from last 7 days   Lab Units 12/06/22  0104   HEMOGLOBIN A1C % 5 1     Results from last 7 days   Lab Units 12/07/22  0516 12/06/22  0104 12/05/22 1948   LACTIC ACID mmol/L  --   --  1 8   PROCALCITONIN ng/ml 1 80* 1 79*  --        Lines/Drains:  Invasive Devices     Peripheral Intravenous Line  Duration           Peripheral IV 12/08/22 Dorsal (posterior); Left Forearm <1 day                      Imaging: Reviewed radiology reports from this admission including: chest xray, chest CT scan and CT head    Recent Cultures (last 7 days):   Results from last 7 days   Lab Units 12/07/22  0549 12/06/22 2209 12/05/22 1948   BLOOD CULTURE  No Growth at 48 hrs  No Growth at 48 hrs    --  Staphylococcus aureus*  Staphylococcus aureus*   GRAM STAIN RESULT   --   --  Gram positive cocci in clusters*  Gram positive cocci in clusters*   LEGIONELLA URINARY ANTIGEN   --  Negative  --        Last 24 Hours Medication List:   Current Facility-Administered Medications   Medication Dose Route Frequency Provider Last Rate   • acetaminophen  650 mg Oral Q6H PRN NAVA Coleman     • albuterol  2 5 mg Nebulization Q6H PRN NAVA Coleman     • allopurinol  200 mg Oral Daily NAVA Coleman     • atorvastatin  40 mg Oral Daily With NAVA French     • cefazolin  2,000 mg Intravenous Q8H Rianna Wood MD 2,000 mg (12/09/22 1303)   • docusate sodium  100 mg Oral BID Ron Ellis MD     • DULoxetine  60 mg Oral BID NAVA Coleman     • finasteride  5 mg Oral Daily NAVA Coleman     • fluticasone-vilanterol  1 puff Inhalation Daily NAVA Coleman     • gabapentin  100 mg Oral HS NAVA Coleman     • isosorbide mononitrate  30 mg Oral Daily NAVA Coleman     • ondansetron  4 mg Intravenous Q6H PRN NAVA Coleman     • pantoprazole  40 mg Oral Early Morning NAVA Coleman     • polyethylene glycol  17 g Oral Daily PRN NAVA Coleman     • polyethylene glycol  17 g Oral Daily Brittany Mcguire MD     • rivaroxaban  15 mg Oral Daily With Breakfast NAVA Coleman     • saccharomyces boulardii  250 mg Oral BID NAVA Coleman     • torsemide  40 mg Oral Daily NAVA Coleman     • umeclidinium  1 puff Inhalation Daily NAVA Coleman          Today, Patient Was Seen By: Brittany Mcguire MD    **Please Note: This note may have been constructed using a voice recognition system  **

## 2022-12-09 NOTE — ASSESSMENT & PLAN NOTE
Wt Readings from Last 3 Encounters:   12/07/22 120 kg (265 lb)   11/22/22 121 kg (265 lb 10 5 oz)   10/20/22 120 kg (265 lb)     On Demadex 40 mg p o  Daily at home  · 2D echo in January 2022 showed normal EF, 65%, moderate concentric hypertrophy, dilated atriums, severe AS, mild to moderate AR, moderate MS  · Chest X-ray showed cardiomegaly with moderate congestive changes  · CT chest showed numerous new pulmonary nodules with mediastinal and hilar lymphadenopathy small right pleural effusion  · 1-2+ lower leg edema on exam, albumin 2 6  · BNP was elevated at 14,000  DemaDex was held initially in the setting of sepsis which was later restarted    Patient also received dose of Lasix yesterday with blood transfusion

## 2022-12-10 LAB
ANION GAP SERPL CALCULATED.3IONS-SCNC: 8 MMOL/L (ref 4–13)
BASOPHILS # BLD AUTO: 0.03 THOUSANDS/ÂΜL (ref 0–0.1)
BASOPHILS NFR BLD AUTO: 0 % (ref 0–1)
BUN SERPL-MCNC: 38 MG/DL (ref 5–25)
CALCIUM SERPL-MCNC: 8.5 MG/DL (ref 8.3–10.1)
CHLORIDE SERPL-SCNC: 95 MMOL/L (ref 96–108)
CO2 SERPL-SCNC: 28 MMOL/L (ref 21–32)
CREAT SERPL-MCNC: 1.61 MG/DL (ref 0.6–1.3)
EOSINOPHIL # BLD AUTO: 0.05 THOUSAND/ÂΜL (ref 0–0.61)
EOSINOPHIL NFR BLD AUTO: 0 % (ref 0–6)
ERYTHROCYTE [DISTWIDTH] IN BLOOD BY AUTOMATED COUNT: 19.8 % (ref 11.6–15.1)
GFR SERPL CREATININE-BSD FRML MDRD: 39 ML/MIN/1.73SQ M
GLUCOSE SERPL-MCNC: 135 MG/DL (ref 65–140)
HCT VFR BLD AUTO: 25.4 % (ref 36.5–49.3)
HGB BLD-MCNC: 7.9 G/DL (ref 12–17)
IMM GRANULOCYTES # BLD AUTO: 0.22 THOUSAND/UL (ref 0–0.2)
IMM GRANULOCYTES NFR BLD AUTO: 2 % (ref 0–2)
LYMPHOCYTES # BLD AUTO: 0.81 THOUSANDS/ÂΜL (ref 0.6–4.47)
LYMPHOCYTES NFR BLD AUTO: 6 % (ref 14–44)
MCH RBC QN AUTO: 26.8 PG (ref 26.8–34.3)
MCHC RBC AUTO-ENTMCNC: 31.1 G/DL (ref 31.4–37.4)
MCV RBC AUTO: 86 FL (ref 82–98)
MONOCYTES # BLD AUTO: 0.9 THOUSAND/ÂΜL (ref 0.17–1.22)
MONOCYTES NFR BLD AUTO: 7 % (ref 4–12)
NEUTROPHILS # BLD AUTO: 11.61 THOUSANDS/ÂΜL (ref 1.85–7.62)
NEUTS SEG NFR BLD AUTO: 85 % (ref 43–75)
NRBC BLD AUTO-RTO: 0 /100 WBCS
PLATELET # BLD AUTO: 170 THOUSANDS/UL (ref 149–390)
PMV BLD AUTO: 10.2 FL (ref 8.9–12.7)
POTASSIUM SERPL-SCNC: 4.2 MMOL/L (ref 3.5–5.3)
RBC # BLD AUTO: 2.95 MILLION/UL (ref 3.88–5.62)
SODIUM SERPL-SCNC: 131 MMOL/L (ref 135–147)
WBC # BLD AUTO: 13.62 THOUSAND/UL (ref 4.31–10.16)

## 2022-12-10 RX ADMIN — PANTOPRAZOLE SODIUM 40 MG: 40 TABLET, DELAYED RELEASE ORAL at 06:05

## 2022-12-10 RX ADMIN — UMECLIDINIUM 1 PUFF: 62.5 AEROSOL, POWDER ORAL at 08:30

## 2022-12-10 RX ADMIN — ATORVASTATIN CALCIUM 40 MG: 40 TABLET, FILM COATED ORAL at 17:15

## 2022-12-10 RX ADMIN — FLUTICASONE FUROATE AND VILANTEROL TRIFENATATE 1 PUFF: 200; 25 POWDER RESPIRATORY (INHALATION) at 08:30

## 2022-12-10 RX ADMIN — CEFAZOLIN SODIUM 2000 MG: 2 SOLUTION INTRAVENOUS at 06:05

## 2022-12-10 RX ADMIN — CEFAZOLIN SODIUM 2000 MG: 2 SOLUTION INTRAVENOUS at 13:13

## 2022-12-10 RX ADMIN — CEFAZOLIN SODIUM 2000 MG: 2 SOLUTION INTRAVENOUS at 21:36

## 2022-12-10 RX ADMIN — POLYETHYLENE GLYCOL 3350 17 G: 17 POWDER, FOR SOLUTION ORAL at 08:26

## 2022-12-10 RX ADMIN — DOCUSATE SODIUM 100 MG: 100 CAPSULE, LIQUID FILLED ORAL at 21:35

## 2022-12-10 RX ADMIN — ALLOPURINOL 200 MG: 100 TABLET ORAL at 08:27

## 2022-12-10 RX ADMIN — Medication 250 MG: at 17:15

## 2022-12-10 RX ADMIN — TORSEMIDE 40 MG: 20 TABLET ORAL at 08:27

## 2022-12-10 RX ADMIN — FINASTERIDE 5 MG: 5 TABLET, FILM COATED ORAL at 08:27

## 2022-12-10 RX ADMIN — DOCUSATE SODIUM 100 MG: 100 CAPSULE, LIQUID FILLED ORAL at 08:27

## 2022-12-10 RX ADMIN — Medication 250 MG: at 08:27

## 2022-12-10 RX ADMIN — ISOSORBIDE MONONITRATE 30 MG: 30 TABLET, EXTENDED RELEASE ORAL at 08:27

## 2022-12-10 RX ADMIN — RIVAROXABAN 15 MG: 15 TABLET, FILM COATED ORAL at 08:27

## 2022-12-10 RX ADMIN — GABAPENTIN 100 MG: 100 CAPSULE ORAL at 21:36

## 2022-12-10 RX ADMIN — DULOXETINE HYDROCHLORIDE 60 MG: 60 CAPSULE, DELAYED RELEASE ORAL at 08:27

## 2022-12-10 RX ADMIN — DULOXETINE HYDROCHLORIDE 60 MG: 60 CAPSULE, DELAYED RELEASE ORAL at 17:15

## 2022-12-10 NOTE — ASSESSMENT & PLAN NOTE
Results from last 7 days   Lab Units 12/10/22  0611 12/09/22  0515 12/08/22  0614 12/07/22  0516 12/06/22  0834 12/05/22  1919   SODIUM mmol/L 131* 133* 131* 130* 133* 135   In the setting of volume overload  Continue Demadex 40 mg p o  daily  Follow-up BMP in am  Will start 1500 mL fluid restriction

## 2022-12-10 NOTE — ASSESSMENT & PLAN NOTE
Wt Readings from Last 3 Encounters:   12/07/22 120 kg (265 lb)   11/22/22 121 kg (265 lb 10 5 oz)   10/20/22 120 kg (265 lb)     On Demadex 40 mg p o  Daily at home  · 2D echo in January 2022 showed normal EF, 65%, moderate concentric hypertrophy, dilated atriums, severe AS, mild to moderate AR, moderate MS  · Chest X-ray showed cardiomegaly with moderate congestive changes  · CT chest showed numerous new pulmonary nodules with mediastinal and hilar lymphadenopathy small right pleural effusion  · 1-2+ lower leg edema on exam, albumin 2 6  · BNP was elevated at 14,000  DemaDex was held initially in the setting of sepsis which was later restarted    Patient also received dose of Lasix with blood transfusion and admitted with a dose of Lasix 40 mg on 12/9/2022 for fluid overload with improved symptoms

## 2022-12-10 NOTE — PROGRESS NOTES
[address]  Progress Note Sebastián Worrell 1940, 80 y o  male MRN: [id number]  Unit/Bed#: [address]  Encounter: [id number]  Primary Care Provider: Joseph Liriano DO   Date and time admitted to hospital: 12/5/2022  6:39 PM    * MSSA bacteremia  Assessment & Plan  Patient presents with somnolence for 24 hours and generalized weakness  Patient reports a little cough and runny nose, denies sore throat/SOB/fever/chills  · Patient hospitalized in November 2022 for symptomatic anemia, hemoglobin 7 1 on admission with positive stool guaiac, received 1 unit of PRBC, underwent EGD and colonoscopy both showed no evidence of bleeding, patient was recommended outpatient capsule endoscopy which is still pending   Plavix was discontinued on discharge, patient remained on Xarelto  · WBC 15 38, repeat 14 80, no bands, patient afebrile  · Lactic acid normal   · Procalcitonin 1 79  · Chest x-ray showed no evidence of pneumonia  · UA no evidence of infection   · COVID-19, flu RSV negative  · CT chest showed -  Numerous new pulmonary nodules, concerning for metastatic disease;Mediastinal and hilar lymphadenopathy, also concerning for metastases; Small right pleural effusion with adjacent basal opacity, most likely represents atelectasis  · Patient was given Zosyn in the ED and was later de-escalated to Rocephin  Later antibiotics were changed to Ancef 2 g every 8 hours  · Blood cultures 2 out of 2 growing Methicillin sensitive Staphylococcus aureus  Bacterial identification panel showing MSSA     · ID input appreciated  · Echocardiogram showed EF of 60% with severe concentric hypertrophy, EF of 60% without any visible vegetations with severe attic stenosis  · Patient underwent MARY which showed EF of 55% without any large vegetation but cannot rule out small vegetation due to calcium and acoustic shadowing  · Discussed in detail with ID, family and cardiology on 12/8/22  · Unclear source-possible from skin translocation as patient noted to have multiple scabs  · CAT scan showing multiple lung nodules-questionable septic emboli  · Pacemaker removal was recommended by ID in the setting of Staphylococcus bacteremia regardless of the primary source -discussed with cardiology  Pacemaker has been in place for 12 years and removal would be an extensive surgical procedure  Given multiple comorbidities cardiology recommending conservative approach and treatment with IV antibiotics with repeat blood cultures  · Repeat blood cultures from 12/7/2022 with no growth at 72 hours  · Patient noted to have mild worsening of the white count today  · Anticipate course of 6 weeks of antibiotics from the date of blood culture clearance  Acute encephalopathy  Assessment & Plan  Likely acute toxic metabolic encephalopathy due to underlying infection, rule out CVA  Patient reports sleepy and feeling weak, oriented to place and person day of week on exam   · CT head showed - Hypodensity in the right parieto-occipital region most likely represents a chronic infarct, however no prior study is available to confirm this    Also differential could be metastatic disease based on the CT of the chest  · No focal neuro deficit on exam   Mental status has improved significantly with treatment of sepsis  · ABG no hypercapnia  · Patient was initially worked up for possible stroke  · Neurology input appreciated  · Continue Xarelto 15 mg p o  daily and Zocor 20 mg p o  daily  · Patient could not get MRI of the brain due to incompatible pacemaker  · Was given full dose aspirin in the ED along with Lipitor 80 mg p o  daily and was continued on Lipitor 40 mg p o  daily  · Hemoglobin A1c was 5 1  ·  Neurology recommending repeating CT scan in 1 week  · PT/OT evaluation and treatment    Abnormal CAT scan  Assessment & Plan  CT Chest showed numerous new pulmonary nodules concerning for metastatic disease along with mediastinal and hilar lymphadenopathy and small right pleural effusion  · Patient had CT abdomen p bon without contrast on 11/19/2022 which was unremarkable  · CT of the brain also showing hypodensity in the right parieto-occipital region which might represent chronic infarct/metastasis  · Pulmonary was consulted who is recommending IR guided lung biopsy  · Patient did have a colonoscopy during prior hospitalization which showed no masses  · No history of malignancy as per family  · Bilateral lung nodules could also be related to septic emboli  Patient might need repeat CAT scan in 3 months  If the nodules are improving patient might not need further work-up but if the nodules are persistent might need biopsy      Elevated troponin  Assessment & Plan  Mild troponin elevation most likely non MI troponin elevation secondary to sepsis  · EKG showed paced rhythm  · Patient also complained of some chest pain and repeat troponin remains around 700  · Repeat EKG showed some inferolateral T wave inversion  · Suspected secondary to symptomatic anemia  Symptoms have improved after 1 unit of PRBC transfusion      Chronic diastolic heart failure [facility])  Assessment & Plan  Wt Readings from Last 3 Encounters:   12/07/22 120 kg (265 lb)   11/22/22 121 kg (265 lb 10 5 oz)   10/20/22 120 kg (265 lb)     On Demadex 40 mg p o  Daily at home  · 2D echo in January 2022 showed normal EF, 65%, moderate concentric hypertrophy, dilated atriums, severe AS, mild to moderate AR, moderate MS  · Chest X-ray showed cardiomegaly with moderate congestive changes  · CT chest showed numerous new pulmonary nodules with mediastinal and hilar lymphadenopathy small right pleural effusion  · 1-2+ lower leg edema on exam, albumin 2 6  · BNP was elevated at 14,000  DemaDex was held initially in the setting of sepsis which was later restarted    Patient also received dose of Lasix with blood transfusion and admitted with a dose of Lasix 40 mg on 12/9/2022 for fluid overload with improved symptoms      Depression with anxiety  Assessment & Plan  Continue Cymbalta    Anemia  Assessment & Plan  Baseline hemoglobin appears to be around 7-9s  · Hemoglobin 9 1, repeat 8 1 in ED  No evidence of active bleeding  · Globin continues to drop and is down to 7 3  · Received 1 unit PRBC transfusion with improved hemoglobin to 7 6-7 9  · Stool for occult blood was negative x1  · History of symptomatic anemia during prior hospitalization  Patient had EGD which showed no sign of upper GI bleeding with mild gastritis and Candida esophagitis  Colonoscopy also showed no evidence of bleeding  Patient was recommended to follow-up outpatient with GI for capsule endoscopy  Moderate to severe aortic stenosis  Assessment & Plan  Severe AS on echo in 1/2022  Underwent MARY which showed mild to moderate concentric hypertrophy with EF of 55% with moderately thickened aortic valve with severe aortic stenosis    Gastro-esophageal reflux disease without esophagitis  Assessment & Plan  Continue PPI    COPD (chronic obstructive pulmonary disease) ([facility])  Assessment & Plan  On Trelegy, albuterol nebulizer p r n  At home  · Was initially on 2 L oxygen but currently stable on room air  · Substitute Trelegy with Breo and Incruse  · Continue albuterol as needed      Obesity (BMI 30-39  9)  Assessment & Plan  Body mass index is 34 02 kg/m²  · Diet and lifestyle modification    CAD (coronary artery disease)  Assessment & Plan  Status post PCI with stents x 2  On Imdur, statin at home    · Continue Imdur, statin and Xarelto  · Patient was recommended to stop Plavix during prior admission      Stage 3a chronic kidney disease [facility])  Assessment & Plan  Lab Results   Component Value Date    EGFR 39 12/10/2022    EGFR 38 12/09/2022    EGFR 39 12/08/2022    CREATININE 1 61 (H) 12/10/2022    CREATININE 1 65 (H) 12/09/2022    CREATININE 1 61 (H) 12/08/2022     Baseline creatinine appears to be around 1  3-1  6s  · Creatinine stable  · Continue Demadex 40 mg p o  daily  Patient received a dose of Lasix with blood transfusion  · Patient was given another dose of Lasix 40 mg on 12/9/2022    Hyponatremia  Assessment & Plan  Results from last 7 days   Lab Units 12/10/22  0611 12/09/22  0515 12/08/22  0614 12/07/22  0516 12/06/22  0834 12/05/22  1919   SODIUM mmol/L 131* 133* 131* 130* 133* 135   In the setting of volume overload  Continue Demadex 40 mg p o  daily  Follow-up BMP in am  Will start 1500 mL fluid restriction    JOVI (obstructive sleep apnea)  Assessment & Plan  Continue CPAP at HS    Hyperlipidemia  Assessment & Plan  Continue statin    Chronic a-fib [facility])  Assessment & Plan  Status post pacemaker insertion  On Xarelto at home  · Continue Xarelto  · Optimize electrolytes    Benign prostatic hyperplasia  Assessment & Plan  Continue Proscar  · Monitor postvoid residual    Benign hypertension with chronic kidney disease, stage III ([address] )  Assessment & Plan  On Demadex at home  · Demadex  was held initially in the setting of sepsis which was restarted  Continue Demadex 40 mg p o  daily  · Patient also received a dose of Lasix with blood transfusion   · Was given a dose of Lasix 40 mg IV on 12/9/22        VTE Pharmacologic Prophylaxis:   High Risk (Score >/= 5) - Pharmacological DVT Prophylaxis Ordered: rivaroxaban (Xarelto)  Sequential Compression Devices Ordered  Patient Centered Rounds: I performed bedside rounds with nursing staff today  Discussions with Specialists or Other Care Team Provider: No    Education and Discussions with Family / Patient: Updated  (daughter) via phone  Time Spent for Care: 45 minutes  More than 50% of total time spent on counseling and coordination of care as described above      Current Length of Stay: 4 day(s)  Current Patient Status: Inpatient   Certification Statement: The patient will continue to require additional inpatient hospital stay due to MSSA bacteremia  Discharge Plan: Anticipate discharge in 48-72 hrs to pending course    Code Status: Level 1 - Full Code    Subjective:   Patient is feeling much better  Denies any chest pain, shortness of breath, abdominal pain, nausea or vomiting    Objective:     Vitals:   Temp (24hrs), Av 6 °F (36 4 °C), Min:97 4 °F (36 3 °C), Max:97 7 °F (36 5 °C)    Temp:  [97 4 °F (36 3 °C)-97 7 °F (36 5 °C)] 97 7 °F (36 5 °C)  HR:  [70-71] 71  Resp:  [18-21] 19  BP: (110-115)/(57-61) 115/60  SpO2:  [93 %-98 %] 96 %  Body mass index is 34 02 kg/m²  Input and Output Summary (last 24 hours): Intake/Output Summary (Last 24 hours) at 12/10/2022 1412  Last data filed at 12/10/2022 0900  Gross per 24 hour   Intake 1170 ml   Output 1450 ml   Net -280 ml       Physical Exam:   Physical Exam  Constitutional:       Appearance: Normal appearance  HENT:      Head: Normocephalic and atraumatic  Eyes:      Extraocular Movements: Extraocular movements intact  Pupils: Pupils are equal, round, and reactive to light  Cardiovascular:      Rate and Rhythm: Normal rate and regular rhythm  Heart sounds: Murmur heard  No gallop  Pulmonary:      Effort: Pulmonary effort is normal       Breath sounds: Rales present  Comments: Few basilar rales  Abdominal:      General: Bowel sounds are normal       Palpations: Abdomen is soft  Tenderness: There is no abdominal tenderness  Musculoskeletal:         General: No swelling or deformity  Normal range of motion  Cervical back: Normal range of motion and neck supple  Skin:     General: Skin is warm and dry  Neurological:      General: No focal deficit present  Mental Status: He is alert          Additional Data:     Labs:  Results from last 7 days   Lab Units 12/10/22  0611 22  0834 22  0104   WBC Thousand/uL 13 62*   < > 14 80*   HEMOGLOBIN g/dL 7 9*   < > 8 1*   HEMATOCRIT % 25 4*   < > 27 2*   PLATELETS Thousands/uL 170   < > 158   BANDS PCT %  --   --  7   NEUTROS PCT % 85*   < >  --    LYMPHS PCT % 6*   < >  --    LYMPHO PCT %  --   --  3*   MONOS PCT % 7   < >  --    MONO PCT %  --   --  8   EOS PCT % 0   < > 0    < > = values in this interval not displayed  Results from last 7 days   Lab Units 12/10/22  0611 12/06/22  0834 12/05/22  1919   SODIUM mmol/L 131*   < > 135   POTASSIUM mmol/L 4 2   < > 3 7   CHLORIDE mmol/L 95*   < > 96   CO2 mmol/L 28   < > 29   BUN mg/dL 38*   < > 32*   CREATININE mg/dL 1 61*   < > 1 65*   ANION GAP mmol/L 8   < > 10   CALCIUM mg/dL 8 5   < > 8 6   ALBUMIN g/dL  --   --  2 6*   TOTAL BILIRUBIN mg/dL  --   --  1 70*   ALK PHOS U/L  --   --  85   ALT U/L  --   --  23   AST U/L  --   --  33   GLUCOSE RANDOM mg/dL 135   < > 140    < > = values in this interval not displayed  Results from last 7 days   Lab Units 12/05/22 1948   INR  1 74*         Results from last 7 days   Lab Units 12/06/22  0104   HEMOGLOBIN A1C % 5 1     Results from last 7 days   Lab Units 12/07/22  0516 12/06/22  0104 12/05/22 1948   LACTIC ACID mmol/L  --   --  1 8   PROCALCITONIN ng/ml 1 80* 1 79*  --        Lines/Drains:  Invasive Devices     Peripheral Intravenous Line  Duration           Peripheral IV 12/08/22 Dorsal (posterior); Left Forearm 1 day                      Imaging: Reviewed radiology reports from this admission including: chest xray, chest CT scan and CT head    Recent Cultures (last 7 days):   Results from last 7 days   Lab Units 12/07/22  0549 12/06/22 2209 12/05/22 1948   BLOOD CULTURE  No Growth at 72 hrs    No Growth at 72 hrs   --  Staphylococcus aureus*  Staphylococcus aureus*   GRAM STAIN RESULT   --   --  Gram positive cocci in clusters*  Gram positive cocci in clusters*   LEGIONELLA URINARY ANTIGEN   --  Negative  --        Last 24 Hours Medication List:   Current Facility-Administered Medications   Medication Dose Route Frequency Provider Last Rate   • acetaminophen  650 mg Oral Q6H PRN NAVA Kingsley • albuterol  2 5 mg Nebulization Q6H PRN NAVA Coleman     • allopurinol  200 mg Oral Daily NAVA Coleman     • atorvastatin  40 mg Oral Daily With NAVA French     • cefazolin  2,000 mg Intravenous Q8H Sherryle Mau, MD 2,000 mg (12/10/22 1313)   • docusate sodium  100 mg Oral BID Sherryle Mau, MD     • DULoxetine  60 mg Oral BID NAVA Coleman     • finasteride  5 mg Oral Daily NAVA Coleman     • fluticasone-vilanterol  1 puff Inhalation Daily NAVA Coleman     • gabapentin  100 mg Oral HS NAVA Coleman     • isosorbide mononitrate  30 mg Oral Daily NAVA Coleman     • ondansetron  4 mg Intravenous Q6H PRN NAVA Coleman     • pantoprazole  40 mg Oral Early Morning NAVA Coleman     • polyethylene glycol  17 g Oral Daily PRN NAVA Coleman     • polyethylene glycol  17 g Oral Daily Sherryle Mau, MD     • rivaroxaban  15 mg Oral Daily With Breakfast NAVA Coleman     • saccharomyces boulardii  250 mg Oral BID NAVA Coleman     • torsemide  40 mg Oral Daily NAVA Coleman     • umeclidinium  1 puff Inhalation Daily NAVA Coleman          Today, Patient Was Seen By: Sherryle Mau, MD    **Please Note: This note may have been constructed using a voice recognition system  **

## 2022-12-10 NOTE — ASSESSMENT & PLAN NOTE
Baseline hemoglobin appears to be around 7-9s  · Hemoglobin 9 1, repeat 8 1 in ED  No evidence of active bleeding  · Globin continues to drop and is down to 7 3  · Received 1 unit PRBC transfusion with improved hemoglobin to 7 6-7 9  · Stool for occult blood was negative x1  · History of symptomatic anemia during prior hospitalization  Patient had EGD which showed no sign of upper GI bleeding with mild gastritis and Candida esophagitis  Colonoscopy also showed no evidence of bleeding  Patient was recommended to follow-up outpatient with GI for capsule endoscopy

## 2022-12-10 NOTE — PLAN OF CARE
Problem: Potential for Falls  Goal: Patient will remain free of falls  Description: INTERVENTIONS:  - Educate patient/family on patient safety including physical limitations  - Instruct patient to call for assistance with activity   - Consult OT/PT to assist with strengthening/mobility   - Keep Call bell within reach  - Keep bed low and locked with side rails adjusted as appropriate  - Keep care items and personal belongings within reach  - Initiate and maintain comfort rounds  - Make Fall Risk Sign visible to staff  - Offer Toileting every 2 Hours, in advance of need  - Initiate/Maintain alarm  - Obtain necessary fall risk management equipment  - Apply yellow socks and bracelet for high fall risk patients  - Consider moving patient to room near nurses station  Outcome: Progressing     Problem: RESPIRATORY - ADULT  Goal: Achieves optimal ventilation and oxygenation  Description: INTERVENTIONS:  - Assess for changes in respiratory status  - Assess for changes in mentation and behavior  - Position to facilitate oxygenation and minimize respiratory effort  - Oxygen administered by appropriate delivery if ordered  - Initiate smoking cessation education as indicated  - Encourage broncho-pulmonary hygiene including cough, deep breathe, Incentive Spirometry  - Assess the need for suctioning and aspirate as needed  - Assess and instruct to report SOB or any respiratory difficulty  - Respiratory Therapy support as indicated  Outcome: Progressing     Problem: MOBILITY - ADULT  Goal: Maintain or return to baseline ADL function  Description: INTERVENTIONS:  -  Assess patient's ability to carry out ADLs; assess patient's baseline for ADL function and identify physical deficits which impact ability to perform ADLs (bathing, care of mouth/teeth, toileting, grooming, dressing, etc )  - Assess/evaluate cause of self-care deficits   - Assess range of motion  - Assess patient's mobility; develop plan if impaired  - Assess patient's need for assistive devices and provide as appropriate  - Encourage maximum independence but intervene and supervise when necessary  - Involve family in performance of ADLs  - Assess for home care needs following discharge   - Consider OT consult to assist with ADL evaluation and planning for discharge  - Provide patient education as appropriate  Outcome: Progressing  Goal: Maintains/Returns to pre admission functional level  Description: INTERVENTIONS:  - Perform BMAT or MOVE assessment daily    - Set and communicate daily mobility goal to care team and patient/family/caregiver  - Collaborate with rehabilitation services on mobility goals if consulted  - Perform Range of Motion 3 times a day  - Reposition patient every 2 hours  - Dangle patient 3 times a day  - Stand patient 3 times a day  - Ambulate patient 3 times a day  - Out of bed to chair 3 times a day   - Out of bed for meals 3 times a day  - Out of bed for toileting  - Record patient progress and toleration of activity level   Outcome: Progressing     Problem: Neurological Deficit  Goal: Neurological status is stable or improving  Description: Interventions:  - Monitor and assess patient's level of consciousness, motor function, sensory function, and level of assistance needed for ADLs  - Monitor and report changes from baseline  Collaborate with interdisciplinary team to initiate plan and implement interventions as ordered  - Provide and maintain a safe environment  - Consider seizure precautions  - Consider fall precautions  - Consider aspiration precautions  - Consider bleeding precautions  Outcome: Progressing     Problem: Potential for Aspiration  Goal: Non-ventilated patient's risk of aspiration is minimized  Description: Assess and monitor vital signs, respiratory status, and labs (WBC)  Monitor for signs of aspiration (tachypnea, cough, rales, wheezing, cyanosis, fever)  - Assess and monitor patient's ability to swallow    - Place patient up in chair to eat if possible  - HOB up at 90 degrees to eat if unable to get patient up into chair   - Supervise patient during oral intake  - Instruct patient/ family to take small bites  - Instruct patient/ family to take small single sips when taking liquids  - Follow patient-specific strategies generated by speech pathologist   Outcome: Progressing     Problem: Nutrition  Goal: Nutrition/Hydration status is improving  Description: Monitor and assess patient's nutrition/hydration status for malnutrition (ex- brittle hair, bruises, dry skin, pale skin and conjunctiva, muscle wasting, smooth red tongue, and disorientation)  Collaborate with interdisciplinary team and initiate plan and interventions as ordered  Monitor patient's weight and dietary intake as ordered or per policy  Utilize nutrition screening tool and intervene per policy  Determine patient's food preferences and provide high-protein, high-caloric foods as appropriate  - Assist patient with eating   - Allow adequate time for meals   - Encourage patient to take dietary supplement as ordered  - Collaborate with clinical nutritionist   - Include patient/family/caregiver in decisions related to nutrition  Outcome: Progressing     Problem: Nutrition/Hydration-ADULT  Goal: Nutrient/Hydration intake appropriate for improving, restoring or maintaining nutritional needs  Description: Monitor and assess patient's nutrition/hydration status for malnutrition  Collaborate with interdisciplinary team and initiate plan and interventions as ordered  Monitor patient's weight and dietary intake as ordered or per policy  Utilize nutrition screening tool and intervene as necessary  Determine patient's food preferences and provide high-protein, high-caloric foods as appropriate       INTERVENTIONS:  - Monitor oral intake, urinary output, labs, and treatment plans  - Assess nutrition and hydration status and recommend course of action  - Evaluate amount of meals eaten  - Assist patient with eating if necessary   - Allow adequate time for meals  - Recommend/ encourage appropriate diets, oral nutritional supplements, and vitamin/mineral supplements  - Order, calculate, and assess calorie counts as needed  - Recommend, monitor, and adjust tube feedings and TPN/PPN based on assessed needs  - Assess need for intravenous fluids  - Provide specific nutrition/hydration education as appropriate  - Include patient/family/caregiver in decisions related to nutrition  Outcome: Progressing

## 2022-12-10 NOTE — ASSESSMENT & PLAN NOTE
On Demadex at home  · Demadex  was held initially in the setting of sepsis which was restarted  Continue Demadex 40 mg p o  daily    · Patient also received a dose of Lasix with blood transfusion   · Was given a dose of Lasix 40 mg IV on 12/9/22

## 2022-12-10 NOTE — ASSESSMENT & PLAN NOTE
Lab Results   Component Value Date    EGFR 39 12/10/2022    EGFR 38 12/09/2022    EGFR 39 12/08/2022    CREATININE 1 61 (H) 12/10/2022    CREATININE 1 65 (H) 12/09/2022    CREATININE 1 61 (H) 12/08/2022     Baseline creatinine appears to be around 1 3-1 6s  · Creatinine stable  · Continue Demadex 40 mg p o  daily    Patient received a dose of Lasix with blood transfusion  · Patient was given another dose of Lasix 40 mg on 12/9/2022

## 2022-12-10 NOTE — ASSESSMENT & PLAN NOTE
Patient presents with somnolence for 24 hours and generalized weakness  Patient reports a little cough and runny nose, denies sore throat/SOB/fever/chills  · Patient hospitalized in November 2022 for symptomatic anemia, hemoglobin 7 1 on admission with positive stool guaiac, received 1 unit of PRBC, underwent EGD and colonoscopy both showed no evidence of bleeding, patient was recommended outpatient capsule endoscopy which is still pending   Plavix was discontinued on discharge, patient remained on Xarelto  · WBC 15 38, repeat 14 80, no bands, patient afebrile  · Lactic acid normal   · Procalcitonin 1 79  · Chest x-ray showed no evidence of pneumonia  · UA no evidence of infection   · COVID-19, flu RSV negative  · CT chest showed -  Numerous new pulmonary nodules, concerning for metastatic disease;Mediastinal and hilar lymphadenopathy, also concerning for metastases; Small right pleural effusion with adjacent basal opacity, most likely represents atelectasis  · Patient was given Zosyn in the ED and was later de-escalated to Rocephin  Later antibiotics were changed to Ancef 2 g every 8 hours  · Blood cultures 2 out of 2 growing Methicillin sensitive Staphylococcus aureus  Bacterial identification panel showing MSSA  · ID input appreciated  · Echocardiogram showed EF of 60% with severe concentric hypertrophy, EF of 60% without any visible vegetations with severe attic stenosis  · Patient underwent MARY which showed EF of 55% without any large vegetation but cannot rule out small vegetation due to calcium and acoustic shadowing  · Discussed in detail with ID, family and cardiology on 12/8/22  · Unclear source-possible from skin translocation as patient noted to have multiple scabs  · CAT scan showing multiple lung nodules-questionable septic emboli  · Pacemaker removal was recommended by ID in the setting of Staphylococcus bacteremia regardless of the primary source -discussed with cardiology    Pacemaker has been in place for 12 years and removal would be an extensive surgical procedure  Given multiple comorbidities cardiology recommending conservative approach and treatment with IV antibiotics with repeat blood cultures  · Repeat blood cultures from 12/7/2022 with no growth at 72 hours  · Patient noted to have mild worsening of the white count today  · Anticipate course of 6 weeks of antibiotics from the date of blood culture clearance

## 2022-12-11 ENCOUNTER — APPOINTMENT (INPATIENT)
Dept: RADIOLOGY | Facility: HOSPITAL | Age: 82
End: 2022-12-11

## 2022-12-11 LAB
ANION GAP SERPL CALCULATED.3IONS-SCNC: 10 MMOL/L (ref 4–13)
BUN SERPL-MCNC: 41 MG/DL (ref 5–25)
CALCIUM SERPL-MCNC: 8.5 MG/DL (ref 8.3–10.1)
CHLORIDE SERPL-SCNC: 97 MMOL/L (ref 96–108)
CO2 SERPL-SCNC: 27 MMOL/L (ref 21–32)
CREAT SERPL-MCNC: 1.74 MG/DL (ref 0.6–1.3)
ERYTHROCYTE [DISTWIDTH] IN BLOOD BY AUTOMATED COUNT: 19.7 % (ref 11.6–15.1)
GFR SERPL CREATININE-BSD FRML MDRD: 35 ML/MIN/1.73SQ M
GLUCOSE SERPL-MCNC: 148 MG/DL (ref 65–140)
HCT VFR BLD AUTO: 26.5 % (ref 36.5–49.3)
HGB BLD-MCNC: 8.3 G/DL (ref 12–17)
MCH RBC QN AUTO: 26.7 PG (ref 26.8–34.3)
MCHC RBC AUTO-ENTMCNC: 31.3 G/DL (ref 31.4–37.4)
MCV RBC AUTO: 85 FL (ref 82–98)
PLATELET # BLD AUTO: 194 THOUSANDS/UL (ref 149–390)
PMV BLD AUTO: 9.5 FL (ref 8.9–12.7)
POTASSIUM SERPL-SCNC: 4.4 MMOL/L (ref 3.5–5.3)
RBC # BLD AUTO: 3.11 MILLION/UL (ref 3.88–5.62)
SODIUM SERPL-SCNC: 134 MMOL/L (ref 135–147)
WBC # BLD AUTO: 14.68 THOUSAND/UL (ref 4.31–10.16)

## 2022-12-11 RX ADMIN — TORSEMIDE 40 MG: 20 TABLET ORAL at 08:57

## 2022-12-11 RX ADMIN — ISOSORBIDE MONONITRATE 30 MG: 30 TABLET, EXTENDED RELEASE ORAL at 08:57

## 2022-12-11 RX ADMIN — DOCUSATE SODIUM 100 MG: 100 CAPSULE, LIQUID FILLED ORAL at 08:57

## 2022-12-11 RX ADMIN — ALLOPURINOL 200 MG: 100 TABLET ORAL at 08:57

## 2022-12-11 RX ADMIN — CEFAZOLIN SODIUM 2000 MG: 2 SOLUTION INTRAVENOUS at 05:42

## 2022-12-11 RX ADMIN — FINASTERIDE 5 MG: 5 TABLET, FILM COATED ORAL at 08:57

## 2022-12-11 RX ADMIN — DOCUSATE SODIUM 100 MG: 100 CAPSULE, LIQUID FILLED ORAL at 21:38

## 2022-12-11 RX ADMIN — Medication 250 MG: at 08:57

## 2022-12-11 RX ADMIN — FLUTICASONE FUROATE AND VILANTEROL TRIFENATATE 1 PUFF: 200; 25 POWDER RESPIRATORY (INHALATION) at 08:59

## 2022-12-11 RX ADMIN — DULOXETINE HYDROCHLORIDE 60 MG: 60 CAPSULE, DELAYED RELEASE ORAL at 08:57

## 2022-12-11 RX ADMIN — UMECLIDINIUM 1 PUFF: 62.5 AEROSOL, POWDER ORAL at 08:59

## 2022-12-11 RX ADMIN — ATORVASTATIN CALCIUM 40 MG: 40 TABLET, FILM COATED ORAL at 17:34

## 2022-12-11 RX ADMIN — RIVAROXABAN 15 MG: 15 TABLET, FILM COATED ORAL at 08:57

## 2022-12-11 RX ADMIN — POLYETHYLENE GLYCOL 3350 17 G: 17 POWDER, FOR SOLUTION ORAL at 08:58

## 2022-12-11 RX ADMIN — DULOXETINE HYDROCHLORIDE 60 MG: 60 CAPSULE, DELAYED RELEASE ORAL at 17:34

## 2022-12-11 RX ADMIN — CEFAZOLIN SODIUM 2000 MG: 2 SOLUTION INTRAVENOUS at 14:20

## 2022-12-11 RX ADMIN — Medication 250 MG: at 17:34

## 2022-12-11 RX ADMIN — CEFAZOLIN SODIUM 2000 MG: 2 SOLUTION INTRAVENOUS at 21:38

## 2022-12-11 RX ADMIN — PANTOPRAZOLE SODIUM 40 MG: 40 TABLET, DELAYED RELEASE ORAL at 05:42

## 2022-12-11 RX ADMIN — GABAPENTIN 100 MG: 100 CAPSULE ORAL at 21:38

## 2022-12-11 NOTE — PLAN OF CARE
Problem: Potential for Falls  Goal: Patient will remain free of falls  Description: INTERVENTIONS:  - Educate patient/family on patient safety including physical limitations  - Instruct patient to call for assistance with activity   - Consult OT/PT to assist with strengthening/mobility   - Keep Call bell within reach  - Keep bed low and locked with side rails adjusted as appropriate  - Keep care items and personal belongings within reach  - Initiate and maintain comfort rounds  - Make Fall Risk Sign visible to staff  - Offer Toileting every 2 Hours, in advance of need  - Initiate/Maintain alarm  - Obtain necessary fall risk management equipment  - Apply yellow socks and bracelet for high fall risk patients  - Consider moving patient to room near nurses station  Outcome: Progressing     Problem: MOBILITY - ADULT  Goal: Maintain or return to baseline ADL function  Description: INTERVENTIONS:  -  Assess patient's ability to carry out ADLs; assess patient's baseline for ADL function and identify physical deficits which impact ability to perform ADLs (bathing, care of mouth/teeth, toileting, grooming, dressing, etc )  - Assess/evaluate cause of self-care deficits   - Assess range of motion  - Assess patient's mobility; develop plan if impaired  - Assess patient's need for assistive devices and provide as appropriate  - Encourage maximum independence but intervene and supervise when necessary  - Involve family in performance of ADLs  - Assess for home care needs following discharge   - Consider OT consult to assist with ADL evaluation and planning for discharge  - Provide patient education as appropriate  Outcome: Progressing     Problem: Prexisting or High Potential for Compromised Skin Integrity  Goal: Skin integrity is maintained or improved  Description: INTERVENTIONS:  - Identify patients at risk for skin breakdown  - Assess and monitor skin integrity  - Assess and monitor nutrition and hydration status  - Monitor labs   - Assess for incontinence   - Turn and reposition patient  - Assist with mobility/ambulation  - Relieve pressure over bony prominences  - Avoid friction and shearing  - Provide appropriate hygiene as needed including keeping skin clean and dry  - Evaluate need for skin moisturizer/barrier cream  - Collaborate with interdisciplinary team   - Patient/family teaching  - Consider wound care consult   Outcome: Progressing

## 2022-12-11 NOTE — ASSESSMENT & PLAN NOTE
CT Chest showed numerous new pulmonary nodules concerning for metastatic disease along with mediastinal and hilar lymphadenopathy and small right pleural effusion  · Patient had CT abdomen p bon without contrast on 11/19/2022 which was unremarkable  · CT of the brain also showing hypodensity in the right parieto-occipital region which might represent chronic infarct/metastasis  · Pulmonary was consulted who recommendedIR guided lung biopsy  · Patient did have a colonoscopy during prior hospitalization which showed no masses  · No history of malignancy as per family  · Bilateral lung nodules could also be related to septic emboli  Patient might need repeat CAT scan in 3 months    If the nodules are improving patient might not need further work-up but if the nodules are persistent might need biopsy

## 2022-12-11 NOTE — ASSESSMENT & PLAN NOTE
Results from last 7 days   Lab Units 12/11/22  0643 12/10/22  0611 12/09/22  0515 12/08/22  0614 12/07/22  0516 12/06/22  0834 12/05/22  1919   SODIUM mmol/L 134* 131* 133* 131* 130* 133* 135   In the setting of volume overload  Continue Demadex 40 mg p o  daily  Sodium level has improved  Continue 1500 mL fluid restriction

## 2022-12-11 NOTE — ASSESSMENT & PLAN NOTE
Patient presents with somnolence for 24 hours and generalized weakness  Patient reports a little cough and runny nose, denies sore throat/SOB/fever/chills  · Patient hospitalized in November 2022 for symptomatic anemia, hemoglobin 7 1 on admission with positive stool guaiac, received 1 unit of PRBC, underwent EGD and colonoscopy both showed no evidence of bleeding, patient was recommended outpatient capsule endoscopy which is still pending   Plavix was discontinued on discharge, patient remained on Xarelto  · WBC 15 38, repeat 14 80, no bands, patient afebrile  · Lactic acid normal   · Procalcitonin 1 79  · Chest x-ray showed no evidence of pneumonia  · UA no evidence of infection   · COVID-19, flu RSV negative  · CT chest showed -  Numerous new pulmonary nodules, concerning for metastatic disease;Mediastinal and hilar lymphadenopathy, also concerning for metastases; Small right pleural effusion with adjacent basal opacity, most likely represents atelectasis  · Patient was given Zosyn in the ED and was later de-escalated to Rocephin  Later antibiotics were changed to Ancef 2 g every 8 hours  · Blood cultures 2 out of 2 growing Methicillin sensitive Staphylococcus aureus  Bacterial identification panel showing MSSA  · ID input appreciated  · Echocardiogram showed EF of 60% with severe concentric hypertrophy, EF of 60% without any visible vegetations with severe attic stenosis  · Patient underwent MARY which showed EF of 55% without any large vegetation but cannot rule out small vegetation due to calcium and acoustic shadowing  · Discussed in detail with ID, family and cardiology on 12/8/22  · Unclear source-possible from skin translocation as patient noted to have multiple scabs  · CAT scan showing multiple lung nodules-questionable septic emboli  · Pacemaker removal was recommended by ID in the setting of Staphylococcus bacteremia regardless of the primary source -discussed with cardiology    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

## 2022-12-11 NOTE — ASSESSMENT & PLAN NOTE
On Demadex at home  · Demadex  was held initially in the setting of sepsis which was restarted  Continue Demadex 40 mg p o  daily    · Patient also received a dose of Lasix with blood transfusion   · Patient was given another dose of Lasix 40 mg IV on 12/9/22  · Will hold Demadex as patient noted to have increasing BUN and creatinine

## 2022-12-11 NOTE — ASSESSMENT & PLAN NOTE
Wt Readings from Last 3 Encounters:   12/07/22 120 kg (265 lb)   11/22/22 121 kg (265 lb 10 5 oz)   10/20/22 120 kg (265 lb)     On Demadex 40 mg p o  Daily at home  · 2D echo in January 2022 showed normal EF, 65%, moderate concentric hypertrophy, dilated atriums, severe AS, mild to moderate AR, moderate MS  · Chest X-ray showed cardiomegaly with moderate congestive changes  · CT chest showed numerous new pulmonary nodules with mediastinal and hilar lymphadenopathy small right pleural effusion  · 1-2+ lower leg edema on exam, albumin 2 6  · BNP was elevated at 14,000  DemaDex was held initially in the setting of sepsis which was later restarted    Patient also received dose of Lasix with blood transfusion and admitted with a dose of Lasix 40 mg on 12/9/2022 for fluid overload with improved symptoms  We will hold Demadex as patient noted to have increasing BUN and creatinine

## 2022-12-11 NOTE — PROGRESS NOTES
Mandy 45  Progress Note Georges Heredia 1940, 80 y o  male MRN: [id number]  Unit/Bed#: 2 [place] [address] Encounter: [id number]  Primary Care Provider: Magan Velazquez DO   Date and time admitted to hospital: 12/5/2022  6:39 PM    * MSSA bacteremia  Assessment & Plan  Patient presents with somnolence for 24 hours and generalized weakness  Patient reports a little cough and runny nose, denies sore throat/SOB/fever/chills  · Patient hospitalized in November 2022 for symptomatic anemia, hemoglobin 7 1 on admission with positive stool guaiac, received 1 unit of PRBC, underwent EGD and colonoscopy both showed no evidence of bleeding, patient was recommended outpatient capsule endoscopy which is still pending   Plavix was discontinued on discharge, patient remained on Xarelto  · WBC 15 38, repeat 14 80, no bands, patient afebrile  · Lactic acid normal   · Procalcitonin 1 79  · Chest x-ray showed no evidence of pneumonia  · UA no evidence of infection   · COVID-19, flu RSV negative  · CT chest showed -  Numerous new pulmonary nodules, concerning for metastatic disease;Mediastinal and hilar lymphadenopathy, also concerning for metastases; Small right pleural effusion with adjacent basal opacity, most likely represents atelectasis  · Patient was given Zosyn in the ED and was later de-escalated to Rocephin  Later antibiotics were changed to Ancef 2 g every 8 hours  · Blood cultures 2 out of 2 growing Methicillin sensitive Staphylococcus aureus  Bacterial identification panel showing MSSA     · ID input appreciated  · Echocardiogram showed EF of 60% with severe concentric hypertrophy, EF of 60% without any visible vegetations with severe attic stenosis  · Patient underwent MARY which showed EF of 55% without any large vegetation but cannot rule out small vegetation due to calcium and acoustic shadowing  · Discussed in detail with ID, family and cardiology on 12/8/22  · Unclear source-possible from skin translocation as patient noted to have multiple scabs  · CAT scan showing multiple lung nodules-questionable septic emboli  · Pacemaker removal was recommended by ID in the setting of Staphylococcus bacteremia regardless of the primary source -discussed with cardiology  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  Acute encephalopathy  Assessment & Plan  Likely acute toxic metabolic encephalopathy due to underlying infection, rule out CVA  Patient reports sleepy and feeling weak, oriented to place and person day of week on exam   · CT head showed - Hypodensity in the right parieto-occipital region most likely represents a chronic infarct, however no prior study is available to confirm this    Also differential could be metastatic disease based on the CT of the chest  · No focal neuro deficit on exam   Mental status has improved significantly with treatment of sepsis  · ABG no hypercapnia  · Patient was initially worked up for possible stroke  · Neurology input appreciated  · Continue Xarelto 15 mg p o  daily and Zocor 20 mg p o  daily  · Patient could not get MRI of the brain due to incompatible pacemaker  · Was given full dose aspirin in the ED along with Lipitor 80 mg p o  daily and was continued on Lipitor 40 mg p o  daily  · Hemoglobin A1c was 5 1  · Neurology recommending repeating CT scan in 1 week  · PT/OT evaluation and treatment    Abnormal CAT scan  Assessment & Plan  CT Chest showed numerous new pulmonary nodules concerning for metastatic disease along with mediastinal and hilar lymphadenopathy and small right pleural effusion  · Patient had CT abdomen p bon without contrast on 11/19/2022 which was unremarkable  · CT of the brain also showing hypodensity in the right parieto-occipital region which might represent chronic infarct/metastasis  · Pulmonary was consulted who recommendedIR guided lung biopsy  · Patient did have a colonoscopy during prior hospitalization which showed no masses  · No history of malignancy as per family  · Bilateral lung nodules could also be related to septic emboli  Patient might need repeat CAT scan in 3 months  If the nodules are improving patient might not need further work-up but if the nodules are persistent might need biopsy      Elevated troponin  Assessment & Plan  Mild troponin elevation most likely non MI troponin elevation secondary to sepsis  · EKG showed paced rhythm  · Patient also complained of some chest pain on 12/8/22 and repeat troponin remains around 700  · Repeat EKG showed some inferolateral T wave inversion  · Suspected secondary to symptomatic anemia  Symptoms have improved after 1 unit of PRBC transfusion      Chronic diastolic heart failure [facility])  Assessment & Plan  Wt Readings from Last 3 Encounters:   12/07/22 120 kg (265 lb)   11/22/22 121 kg (265 lb 10 5 oz)   10/20/22 120 kg (265 lb)     On Demadex 40 mg p o  Daily at home  · 2D echo in January 2022 showed normal EF, 65%, moderate concentric hypertrophy, dilated atriums, severe AS, mild to moderate AR, moderate MS  · Chest X-ray showed cardiomegaly with moderate congestive changes  · CT chest showed numerous new pulmonary nodules with mediastinal and hilar lymphadenopathy small right pleural effusion  · 1-2+ lower leg edema on exam, albumin 2 6  · BNP was elevated at 14,000  DemaDex was held initially in the setting of sepsis which was later restarted    Patient also received dose of Lasix with blood transfusion and admitted with a dose of Lasix 40 mg on 12/9/2022 for fluid overload with improved symptoms  We will hold Demadex as patient noted to have increasing BUN and creatinine      Depression with anxiety  Assessment & Plan  Continue Cymbalta    Anemia  Assessment & Plan  Baseline hemoglobin appears to be around 7-9s  · Hemoglobin 9 1, repeat 8 1 in ED  No evidence of active bleeding  · Globin continues to drop and is down to 7 3  · Received 1 unit PRBC transfusion with improved hemoglobin which is now up to 8 3  · Stool for occult blood was negative x1  · History of symptomatic anemia during prior hospitalization  Patient had EGD which showed no sign of upper GI bleeding with mild gastritis and Candida esophagitis  Colonoscopy also showed no evidence of bleeding  Patient was recommended to follow-up outpatient with GI for capsule endoscopy  Moderate to severe aortic stenosis  Assessment & Plan  Severe AS on echo in 1/2022  Underwent MARY which showed mild to moderate concentric hypertrophy with EF of 55% with moderately thickened aortic valve with severe aortic stenosis    Gastro-esophageal reflux disease without esophagitis  Assessment & Plan  Continue PPI    COPD (chronic obstructive pulmonary disease) ([facility])  Assessment & Plan  On Trelegy, albuterol nebulizer p r n  At home  · Was initially on 2 L oxygen but currently stable on room air  · Substitute Trelegy with Breo and Incruse  · Continue albuterol as needed      Obesity (BMI 30-39  9)  Assessment & Plan  Body mass index is 34 02 kg/m²  · Diet and lifestyle modification    CAD (coronary artery disease)  Assessment & Plan  Status post PCI with stents x 2  On Imdur, statin at home    · Continue Imdur, statin and Xarelto  · Patient was recommended to stop Plavix during prior admission      Stage 3a chronic kidney disease [facility])  Assessment & Plan  Lab Results   Component Value Date    EGFR 35 12/11/2022    EGFR 39 12/10/2022    EGFR 38 12/09/2022    CREATININE 1 74 (H) 12/11/2022    CREATININE 1 61 (H) 12/10/2022    CREATININE 1 65 (H) 12/09/2022     Baseline creatinine appears to be around 1 3-1 6s  · Creatinine stable  · Continue Demadex 40 mg p o  daily  Patient received a dose of Lasix with blood transfusion  · Patient was given another dose of Lasix 40 mg on 12/9/2022  · Hold Demadex as patient noted to have increasing BUN and creatinine    Hyponatremia  Assessment & Plan  Results from last 7 days   Lab Units 12/11/22  0643 12/10/22  0611 12/09/22  0515 12/08/22  0614 12/07/22  0516 12/06/22  0834 12/05/22  1919   SODIUM mmol/L 134* 131* 133* 131* 130* 133* 135   In the setting of volume overload  Continue Demadex 40 mg p o  daily  Sodium level has improved  Continue 1500 mL fluid restriction    JOVI (obstructive sleep apnea)  Assessment & Plan  Continue CPAP at HS    Hyperlipidemia  Assessment & Plan  Continue statin    Chronic a-fib [facility])  Assessment & Plan  Status post pacemaker insertion  On Xarelto at home  · Continue Xarelto      Benign prostatic hyperplasia  Assessment & Plan  Continue Proscar  · Monitor postvoid residual    Benign hypertension with chronic kidney disease, stage III ([facility] [address] )  Assessment & Plan  On Demadex at home  · Demadex  was held initially in the setting of sepsis which was restarted  Continue Demadex 40 mg p o  daily  · Patient also received a dose of Lasix with blood transfusion   · Patient was given another dose of Lasix 40 mg IV on 12/9/22  · Will hold Demadex as patient noted to have increasing BUN and creatinine          VTE Pharmacologic Prophylaxis:   High Risk (Score >/= 5) - Pharmacological DVT Prophylaxis Ordered: rivaroxaban (Xarelto)  Sequential Compression Devices Ordered  Patient Centered Rounds: I performed bedside rounds with nursing staff today  Discussions with Specialists or Other Care Team Provider: No    Education and Discussions with Family / Patient: Updated  (daughter) via phone  Time Spent for Care: 45 minutes   More than 50% of total time spent on counseling and coordination of care as described above  Current Length of Stay: 5 day(s)  Current Patient Status: Inpatient   Certification Statement: The patient will continue to require additional inpatient hospital stay due to MSSA bacteremia, CKD  Discharge Plan: Anticipate discharge in 24-48 hrs to rehab facility  Code Status: Level 1 - Full Code    Subjective:   Patient had an episode of vomiting today  Denies any shortness of breath  Minimal right-sided chest discomfort  Denies any abdominal pain  Did not have a bowel movement in 2 days  Objective:     Vitals:   Temp (24hrs), Av 4 °F (36 3 °C), Min:97 3 °F (36 3 °C), Max:97 6 °F (36 4 °C)    Temp:  [97 3 °F (36 3 °C)-97 6 °F (36 4 °C)] 97 4 °F (36 3 °C)  HR:  [67-73] 67  Resp:  [16-20] 17  BP: (113-118)/(57-67) 113/64  SpO2:  [93 %-99 %] 99 %  Body mass index is 34 02 kg/m²  Input and Output Summary (last 24 hours): Intake/Output Summary (Last 24 hours) at 2022 1452  Last data filed at 2022 1100  Gross per 24 hour   Intake 500 ml   Output 1750 ml   Net -1250 ml       Physical Exam:   Physical Exam  Constitutional:       Appearance: Normal appearance  HENT:      Head: Normocephalic and atraumatic  Eyes:      Extraocular Movements: Extraocular movements intact  Pupils: Pupils are equal, round, and reactive to light  Cardiovascular:      Rate and Rhythm: Normal rate  Rhythm irregular  Heart sounds: No murmur heard  No gallop  Pulmonary:      Effort: Pulmonary effort is normal       Breath sounds: Normal breath sounds  Abdominal:      General: Bowel sounds are normal       Palpations: Abdomen is soft  Tenderness: There is no abdominal tenderness  Musculoskeletal:         General: No swelling or deformity  Normal range of motion  Cervical back: Normal range of motion and neck supple  Right lower leg: Edema present  Left lower leg: Edema present  Skin:     General: Skin is warm and dry  Neurological:      General: No focal deficit present  Mental Status: He is alert  Additional Data:     Labs:  Results from last 7 days   Lab Units 12/11/22  0643 12/10/22  0611 12/06/22  0834 12/06/22  0104   WBC Thousand/uL 14 68* 13 62*   < > 14 80*   HEMOGLOBIN g/dL 8 3* 7 9*   < > 8 1*   HEMATOCRIT % 26 5* 25 4*   < > 27 2*   PLATELETS Thousands/uL 194 170   < > 158   BANDS PCT %  --   --   --  7   NEUTROS PCT %  --  85*   < >  --    LYMPHS PCT %  --  6*   < >  --    LYMPHO PCT %  --   --   --  3*   MONOS PCT %  --  7   < >  --    MONO PCT %  --   --   --  8   EOS PCT %  --  0   < > 0    < > = values in this interval not displayed  Results from last 7 days   Lab Units 12/11/22  0643 12/06/22  0834 12/05/22  1919   SODIUM mmol/L 134*   < > 135   POTASSIUM mmol/L 4 4   < > 3 7   CHLORIDE mmol/L 97   < > 96   CO2 mmol/L 27   < > 29   BUN mg/dL 41*   < > 32*   CREATININE mg/dL 1 74*   < > 1 65*   ANION GAP mmol/L 10   < > 10   CALCIUM mg/dL 8 5   < > 8 6   ALBUMIN g/dL  --   --  2 6*   TOTAL BILIRUBIN mg/dL  --   --  1 70*   ALK PHOS U/L  --   --  85   ALT U/L  --   --  23   AST U/L  --   --  33   GLUCOSE RANDOM mg/dL 148*   < > 140    < > = values in this interval not displayed  Results from last 7 days   Lab Units 12/05/22 1948   INR  1 74*         Results from last 7 days   Lab Units 12/06/22  0104   HEMOGLOBIN A1C % 5 1     Results from last 7 days   Lab Units 12/07/22  0516 12/06/22 0104 12/05/22 1948   LACTIC ACID mmol/L  --   --  1 8   PROCALCITONIN ng/ml 1 80* 1 79*  --        Lines/Drains:  Invasive Devices     Peripheral Intravenous Line  Duration           Peripheral IV 12/08/22 Dorsal (posterior); Left Forearm 2 days                      Imaging: Reviewed radiology reports from this admission including: chest xray, chest CT scan and CT head    Recent Cultures (last 7 days):   Results from last 7 days   Lab Units 12/07/22  0549 12/06/22  2209 12/05/22  1948   BLOOD CULTURE  No Growth After 4 Days  No Growth After 4 Days  --  Staphylococcus aureus*  Staphylococcus aureus*   GRAM STAIN RESULT   --   --  Gram positive cocci in clusters*  Gram positive cocci in clusters*   LEGIONELLA URINARY ANTIGEN   --  Negative  --        Last 24 Hours Medication List:   Current Facility-Administered Medications   Medication Dose Route Frequency Provider Last Rate   • acetaminophen  650 mg Oral Q6H PRN NAVA Coleman     • albuterol  2 5 mg Nebulization Q6H PRN NAVA Coleman     • allopurinol  200 mg Oral Daily NAVA Coleman     • atorvastatin  40 mg Oral Daily With NAVA French     • cefazolin  2,000 mg Intravenous Q8H Andreas Banks MD 2,000 mg (12/11/22 1420)   • docusate sodium  100 mg Oral BID Ron Ellis MD     • DULoxetine  60 mg Oral BID NAVA Coleman     • finasteride  5 mg Oral Daily NAVA Coleman     • fluticasone-vilanterol  1 puff Inhalation Daily NAVA Coleman     • gabapentin  100 mg Oral HS NAVA Coleman     • isosorbide mononitrate  30 mg Oral Daily NAVA Coleman     • ondansetron  4 mg Intravenous Q6H PRN NAVA Coleman     • pantoprazole  40 mg Oral Early Morning NAVA Coleman     • polyethylene glycol  17 g Oral Daily PRN NAVA Coleman     • polyethylene glycol  17 g Oral Daily Andreas Banks MD     • rivaroxaban  15 mg Oral Daily With Breakfast NAVA Coleman     • saccharomyces boulardii  250 mg Oral BID NAVA Coleman     • umeclidinium  1 puff Inhalation Daily NAVA Coleman          Today, Patient Was Seen By: Andreas Banks MD    **Please Note: This note may have been constructed using a voice recognition system  **

## 2022-12-11 NOTE — ASSESSMENT & PLAN NOTE
Lab Results   Component Value Date    EGFR 35 12/11/2022    EGFR 39 12/10/2022    EGFR 38 12/09/2022    CREATININE 1 74 (H) 12/11/2022    CREATININE 1 61 (H) 12/10/2022    CREATININE 1 65 (H) 12/09/2022     Baseline creatinine appears to be around 1 3-1 6s  · Creatinine stable  · Continue Demadex 40 mg p o  daily    Patient received a dose of Lasix with blood transfusion  · Patient was given another dose of Lasix 40 mg on 12/9/2022  · Hold Demadex as patient noted to have increasing BUN and creatinine

## 2022-12-11 NOTE — ASSESSMENT & PLAN NOTE
Mild troponin elevation most likely non MI troponin elevation secondary to sepsis  · EKG showed paced rhythm  · Patient also complained of some chest pain on 12/8/22 and repeat troponin remains around 700  · Repeat EKG showed some inferolateral T wave inversion  · Suspected secondary to symptomatic anemia    Symptoms have improved after 1 unit of PRBC transfusion

## 2022-12-11 NOTE — ASSESSMENT & PLAN NOTE
Baseline hemoglobin appears to be around 7-9s  · Hemoglobin 9 1, repeat 8 1 in ED  No evidence of active bleeding  · Globin continues to drop and is down to 7 3  · Received 1 unit PRBC transfusion with improved hemoglobin which is now up to 8 3  · Stool for occult blood was negative x1  · History of symptomatic anemia during prior hospitalization  Patient had EGD which showed no sign of upper GI bleeding with mild gastritis and Candida esophagitis  Colonoscopy also showed no evidence of bleeding  Patient was recommended to follow-up outpatient with GI for capsule endoscopy

## 2022-12-12 ENCOUNTER — APPOINTMENT (INPATIENT)
Dept: RADIOLOGY | Facility: HOSPITAL | Age: 82
End: 2022-12-12

## 2022-12-12 ENCOUNTER — APPOINTMENT (OUTPATIENT)
Dept: NON INVASIVE DIAGNOSTICS | Facility: HOSPITAL | Age: 82
End: 2022-12-12
Attending: RADIOLOGY

## 2022-12-12 LAB
ANION GAP SERPL CALCULATED.3IONS-SCNC: 10 MMOL/L (ref 4–13)
ANISOCYTOSIS BLD QL SMEAR: PRESENT
BACTERIA BLD CULT: NORMAL
BACTERIA BLD CULT: NORMAL
BASOPHILS # BLD MANUAL: 0 THOUSAND/UL (ref 0–0.1)
BASOPHILS NFR MAR MANUAL: 0 % (ref 0–1)
BUN SERPL-MCNC: 43 MG/DL (ref 5–25)
CALCIUM SERPL-MCNC: 8.8 MG/DL (ref 8.3–10.1)
CHLORIDE SERPL-SCNC: 93 MMOL/L (ref 96–108)
CO2 SERPL-SCNC: 27 MMOL/L (ref 21–32)
CREAT SERPL-MCNC: 1.81 MG/DL (ref 0.6–1.3)
EOSINOPHIL # BLD MANUAL: 0 THOUSAND/UL (ref 0–0.4)
EOSINOPHIL NFR BLD MANUAL: 0 % (ref 0–6)
ERYTHROCYTE [DISTWIDTH] IN BLOOD BY AUTOMATED COUNT: 19.9 % (ref 11.6–15.1)
GFR SERPL CREATININE-BSD FRML MDRD: 34 ML/MIN/1.73SQ M
GLUCOSE SERPL-MCNC: 166 MG/DL (ref 65–140)
HCT VFR BLD AUTO: 27 % (ref 36.5–49.3)
HGB BLD-MCNC: 8.3 G/DL (ref 12–17)
LYMPHOCYTES # BLD AUTO: 0.89 THOUSAND/UL (ref 0.6–4.47)
LYMPHOCYTES # BLD AUTO: 7 % (ref 14–44)
MCH RBC QN AUTO: 26.6 PG (ref 26.8–34.3)
MCHC RBC AUTO-ENTMCNC: 30.7 G/DL (ref 31.4–37.4)
MCV RBC AUTO: 87 FL (ref 82–98)
METAMYELOCYTES NFR BLD MANUAL: 2 % (ref 0–1)
MONOCYTES # BLD AUTO: 0.64 THOUSAND/UL (ref 0–1.22)
MONOCYTES NFR BLD: 5 % (ref 4–12)
NEUTROPHILS # BLD MANUAL: 10.98 THOUSAND/UL (ref 1.85–7.62)
NEUTS BAND NFR BLD MANUAL: 4 % (ref 0–8)
NEUTS SEG NFR BLD AUTO: 82 % (ref 43–75)
PLATELET # BLD AUTO: 227 THOUSANDS/UL (ref 149–390)
PLATELET BLD QL SMEAR: ADEQUATE
PMV BLD AUTO: 10 FL (ref 8.9–12.7)
POLYCHROMASIA BLD QL SMEAR: PRESENT
POTASSIUM SERPL-SCNC: 4.3 MMOL/L (ref 3.5–5.3)
RBC # BLD AUTO: 3.12 MILLION/UL (ref 3.88–5.62)
RBC MORPH BLD: PRESENT
ROULEAUX BLD QL SMEAR: PRESENT
SMUDGE CELLS BLD QL SMEAR: PRESENT
SODIUM SERPL-SCNC: 130 MMOL/L (ref 135–147)
TOXIC GRANULES BLD QL SMEAR: PRESENT
WBC # BLD AUTO: 12.77 THOUSAND/UL (ref 4.31–10.16)

## 2022-12-12 PROCEDURE — 02HV33Z INSERTION OF INFUSION DEVICE INTO SUPERIOR VENA CAVA, PERCUTANEOUS APPROACH: ICD-10-PCS | Performed by: RADIOLOGY

## 2022-12-12 PROCEDURE — 0JH63XZ INSERTION OF TUNNELED VASCULAR ACCESS DEVICE INTO CHEST SUBCUTANEOUS TISSUE AND FASCIA, PERCUTANEOUS APPROACH: ICD-10-PCS | Performed by: RADIOLOGY

## 2022-12-12 PROCEDURE — B518ZZA FLUOROSCOPY OF SUPERIOR VENA CAVA, GUIDANCE: ICD-10-PCS | Performed by: RADIOLOGY

## 2022-12-12 RX ORDER — FENTANYL CITRATE 50 UG/ML
INJECTION, SOLUTION INTRAMUSCULAR; INTRAVENOUS AS NEEDED
Status: COMPLETED | OUTPATIENT
Start: 2022-12-12 | End: 2022-12-12

## 2022-12-12 RX ORDER — LIDOCAINE 50 MG/G
1 PATCH TOPICAL DAILY
Status: DISCONTINUED | OUTPATIENT
Start: 2022-12-13 | End: 2022-12-20 | Stop reason: HOSPADM

## 2022-12-12 RX ORDER — SENNOSIDES 8.6 MG
1 TABLET ORAL
Status: DISCONTINUED | OUTPATIENT
Start: 2022-12-12 | End: 2022-12-20 | Stop reason: HOSPADM

## 2022-12-12 RX ADMIN — DULOXETINE HYDROCHLORIDE 60 MG: 60 CAPSULE, DELAYED RELEASE ORAL at 08:44

## 2022-12-12 RX ADMIN — GABAPENTIN 100 MG: 100 CAPSULE ORAL at 21:25

## 2022-12-12 RX ADMIN — CEFAZOLIN SODIUM 2000 MG: 2 SOLUTION INTRAVENOUS at 21:25

## 2022-12-12 RX ADMIN — CEFAZOLIN SODIUM 2000 MG: 2 SOLUTION INTRAVENOUS at 13:01

## 2022-12-12 RX ADMIN — FLUTICASONE FUROATE AND VILANTEROL TRIFENATATE 1 PUFF: 200; 25 POWDER RESPIRATORY (INHALATION) at 08:44

## 2022-12-12 RX ADMIN — SENNOSIDES 8.6 MG: 8.6 TABLET, FILM COATED ORAL at 21:25

## 2022-12-12 RX ADMIN — PANTOPRAZOLE SODIUM 40 MG: 40 TABLET, DELAYED RELEASE ORAL at 05:33

## 2022-12-12 RX ADMIN — DULOXETINE HYDROCHLORIDE 60 MG: 60 CAPSULE, DELAYED RELEASE ORAL at 17:08

## 2022-12-12 RX ADMIN — Medication 250 MG: at 17:08

## 2022-12-12 RX ADMIN — FINASTERIDE 5 MG: 5 TABLET, FILM COATED ORAL at 08:44

## 2022-12-12 RX ADMIN — DOCUSATE SODIUM 100 MG: 100 CAPSULE, LIQUID FILLED ORAL at 08:44

## 2022-12-12 RX ADMIN — ALLOPURINOL 200 MG: 100 TABLET ORAL at 08:44

## 2022-12-12 RX ADMIN — Medication 20 ML: at 16:23

## 2022-12-12 RX ADMIN — UMECLIDINIUM 1 PUFF: 62.5 AEROSOL, POWDER ORAL at 08:44

## 2022-12-12 RX ADMIN — ALBUTEROL SULFATE 2.5 MG: 2.5 SOLUTION RESPIRATORY (INHALATION) at 05:47

## 2022-12-12 RX ADMIN — CEFAZOLIN SODIUM 2000 MG: 2 SOLUTION INTRAVENOUS at 05:33

## 2022-12-12 RX ADMIN — ATORVASTATIN CALCIUM 40 MG: 40 TABLET, FILM COATED ORAL at 17:08

## 2022-12-12 RX ADMIN — ALBUTEROL SULFATE 2.5 MG: 2.5 SOLUTION RESPIRATORY (INHALATION) at 17:15

## 2022-12-12 RX ADMIN — DOCUSATE SODIUM 100 MG: 100 CAPSULE, LIQUID FILLED ORAL at 21:25

## 2022-12-12 RX ADMIN — RIVAROXABAN 15 MG: 15 TABLET, FILM COATED ORAL at 08:47

## 2022-12-12 RX ADMIN — FENTANYL CITRATE 25 MCG: 50 INJECTION, SOLUTION INTRAMUSCULAR; INTRAVENOUS at 16:23

## 2022-12-12 RX ADMIN — POLYETHYLENE GLYCOL 3350 17 G: 17 POWDER, FOR SOLUTION ORAL at 08:44

## 2022-12-12 RX ADMIN — Medication 250 MG: at 08:44

## 2022-12-12 NOTE — ASSESSMENT & PLAN NOTE
Lab Results   Component Value Date    EGFR 34 12/12/2022    EGFR 35 12/11/2022    EGFR 39 12/10/2022    CREATININE 1 81 (H) 12/12/2022    CREATININE 1 74 (H) 12/11/2022    CREATININE 1 61 (H) 12/10/2022     Baseline creatinine appears to be around 1 3-1 6s  · Creatinine stable  · Continue Demadex 40 mg p o  daily    Patient received a dose of Lasix with blood transfusion  · Patient was given another dose of Lasix 40 mg on 12/9/2022  · Dex held as BUN/creatinine are getting higher

## 2022-12-12 NOTE — ASSESSMENT & PLAN NOTE
Likely acute toxic metabolic encephalopathy due to underlying infection, rule out CVA  Patient reports sleepy and feeling weak, oriented to place and person day of week on exam   · CT head showed - Hypodensity in the right parieto-occipital region most likely represents a chronic infarct, however no prior study is available to confirm this    Also differential could be metastatic disease based on the CT of the chest  · No focal neuro deficit on exam   Mental status has improved significantly with treatment of sepsis  · ABG no hypercapnia  · Patient was initially worked up for possible stroke  · Neurology input appreciated  · Continue Xarelto 15 mg p o  daily and Zocor 20 mg p o  daily  · Patient could not get MRI of the brain due to incompatible pacemaker  · Was given full dose aspirin in the ED along with Lipitor 80 mg p o  daily and was continued on Lipitor 40 mg p o  daily  · Hemoglobin A1c was 5 1  · Will repeat  CT of the brain tomorrow after 1 week of initial CAT scan  · PT/OT evaluation and treatment

## 2022-12-12 NOTE — PLAN OF CARE
Problem: Potential for Falls  Goal: Patient will remain free of falls  Description: INTERVENTIONS:  - Educate patient/family on patient safety including physical limitations  - Instruct patient to call for assistance with activity   - Consult OT/PT to assist with strengthening/mobility   - Keep Call bell within reach  - Keep bed low and locked with side rails adjusted as appropriate  - Keep care items and personal belongings within reach  - Initiate and maintain comfort rounds  - Make Fall Risk Sign visible to staff  - Offer Toileting every 2 Hours, in advance of need  - Initiate/Maintain alarm  - Obtain necessary fall risk management equipment  - Apply yellow socks and bracelet for high fall risk patients  - Consider moving patient to room near nurses station  Outcome: Progressing     Problem: RESPIRATORY - ADULT  Goal: Achieves optimal ventilation and oxygenation  Description: INTERVENTIONS:  - Assess for changes in respiratory status  - Assess for changes in mentation and behavior  - Position to facilitate oxygenation and minimize respiratory effort  - Oxygen administered by appropriate delivery if ordered  - Initiate smoking cessation education as indicated  - Encourage broncho-pulmonary hygiene including cough, deep breathe, Incentive Spirometry  - Assess the need for suctioning and aspirate as needed  - Assess and instruct to report SOB or any respiratory difficulty  - Respiratory Therapy support as indicated  Outcome: Progressing     Problem: MOBILITY - ADULT  Goal: Maintain or return to baseline ADL function  Description: INTERVENTIONS:  -  Assess patient's ability to carry out ADLs; assess patient's baseline for ADL function and identify physical deficits which impact ability to perform ADLs (bathing, care of mouth/teeth, toileting, grooming, dressing, etc )  - Assess/evaluate cause of self-care deficits   - Assess range of motion  - Assess patient's mobility; develop plan if impaired  - Assess patient's need for assistive devices and provide as appropriate  - Encourage maximum independence but intervene and supervise when necessary  - Involve family in performance of ADLs  - Assess for home care needs following discharge   - Consider OT consult to assist with ADL evaluation and planning for discharge  - Provide patient education as appropriate  Outcome: Progressing

## 2022-12-12 NOTE — PLAN OF CARE
Problem: Potential for Falls  Goal: Patient will remain free of falls  Description: INTERVENTIONS:  - Educate patient/family on patient safety including physical limitations  - Instruct patient to call for assistance with activity   - Consult OT/PT to assist with strengthening/mobility   - Keep Call bell within reach  - Keep bed low and locked with side rails adjusted as appropriate  - Keep care items and personal belongings within reach  - Initiate and maintain comfort rounds  - Make Fall Risk Sign visible to staff  - Offer Toileting every 2 Hours, in advance of need  - Initiate/Maintain alarm  - Obtain necessary fall risk management equipment  - Apply yellow socks and bracelet for high fall risk patients  - Consider moving patient to room near nurses station  Outcome: Progressing     Problem: RESPIRATORY - ADULT  Goal: Achieves optimal ventilation and oxygenation  Description: INTERVENTIONS:  - Assess for changes in respiratory status  - Assess for changes in mentation and behavior  - Position to facilitate oxygenation and minimize respiratory effort  - Oxygen administered by appropriate delivery if ordered  - Initiate smoking cessation education as indicated  - Encourage broncho-pulmonary hygiene including cough, deep breathe, Incentive Spirometry  - Assess the need for suctioning and aspirate as needed  - Assess and instruct to report SOB or any respiratory difficulty  - Respiratory Therapy support as indicated  Outcome: Progressing     Problem: MOBILITY - ADULT  Goal: Maintain or return to baseline ADL function  Description: INTERVENTIONS:  -  Assess patient's ability to carry out ADLs; assess patient's baseline for ADL function and identify physical deficits which impact ability to perform ADLs (bathing, care of mouth/teeth, toileting, grooming, dressing, etc )  - Assess/evaluate cause of self-care deficits   - Assess range of motion  - Assess patient's mobility; develop plan if impaired  - Assess patient's need for assistive devices and provide as appropriate  - Encourage maximum independence but intervene and supervise when necessary  - Involve family in performance of ADLs  - Assess for home care needs following discharge   - Consider OT consult to assist with ADL evaluation and planning for discharge  - Provide patient education as appropriate  Outcome: Progressing  Goal: Maintains/Returns to pre admission functional level  Description: INTERVENTIONS:  - Perform BMAT or MOVE assessment daily    - Set and communicate daily mobility goal to care team and patient/family/caregiver  - Collaborate with rehabilitation services on mobility goals if consulted  - Perform Range of Motion 3 times a day  - Reposition patient every 2 hours    - Dangle patient 3 times a day  - Stand patient 3 times a day  - Ambulate patient 3 times a day  - Out of bed to chair 3 times a day   - Out of bed for meals 3 times a day  - Out of bed for toileting  - Record patient progress and toleration of activity level   Outcome: Progressing     Problem: Prexisting or High Potential for Compromised Skin Integrity  Goal: Skin integrity is maintained or improved  Description: INTERVENTIONS:  - Identify patients at risk for skin breakdown  - Assess and monitor skin integrity  - Assess and monitor nutrition and hydration status  - Monitor labs   - Assess for incontinence   - Turn and reposition patient  - Assist with mobility/ambulation  - Relieve pressure over bony prominences  - Avoid friction and shearing  - Provide appropriate hygiene as needed including keeping skin clean and dry  - Evaluate need for skin moisturizer/barrier cream  - Collaborate with interdisciplinary team   - Patient/family teaching  - Consider wound care consult   Outcome: Progressing

## 2022-12-12 NOTE — PLAN OF CARE
Problem: OCCUPATIONAL THERAPY ADULT  Goal: Performs self-care activities at highest level of function for planned discharge setting  See evaluation for individualized goals  Description: Treatment Interventions: ADL retraining, Functional transfer training, UE strengthening/ROM, Endurance training, Patient/family training, Equipment evaluation/education, Compensatory technique education, Continued evaluation, Energy conservation, Activityengagement          See flowsheet documentation for full assessment, interventions and recommendations  Outcome: Progressing  Note: Limitation: Decreased ADL status, Decreased UE strength, Decreased Safe judgement during ADL, Decreased endurance, Decreased self-care trans, Decreased high-level ADLs  Prognosis: Good  Assessment: Patient seen for OT treatment this AM  Patient received seated in standard chair with neck in significant flexion  Patient repositioned, however continues to hold neck in flexion  Recommended transfer to recliner chair with able to recline seat to adjust neck position however patient adamantly declined  Patient requiring significantly more assistance with ADLs and mobility as compared to initial evaluation  Limited by neck pain and generalized fatigue  Required increased time to complete simple ADL tasks and frequent rest breaks  The patient's raw score on the AM-PAC Daily Activity inpatient short form is 12, standardized score is 30 6, less than 39 4  Patients at this level are likely to benefit from discharge to post-acute rehabilitation services  Please refer to the recommendation of the Occupational Therapist for safe discharge planning  At end of session, patient seated in chair with all needs met  Daughter at bedside       OT Discharge Recommendation: Post acute rehabilitation services

## 2022-12-12 NOTE — PROGRESS NOTES
Mandy 45  Progress Note Bre Baca 1940, 80 y o  male MRN: [id number]  Unit/Bed#: [address] [place] [address] Encounter: [id number]  Primary Care Provider: Walter Howard DO   Date and time admitted to hospital: 12/5/2022  6:39 PM    * MSSA bacteremia  Assessment & Plan  Patient presents with somnolence for 24 hours and generalized weakness  Patient reports a little cough and runny nose, denies sore throat/SOB/fever/chills  · Patient hospitalized in November 2022 for symptomatic anemia, hemoglobin 7 1 on admission with positive stool guaiac, received 1 unit of PRBC, underwent EGD and colonoscopy both showed no evidence of bleeding, patient was recommended outpatient capsule endoscopy which is still pending   Plavix was discontinued on discharge, patient remained on Xarelto  · WBC 15 38, repeat 14 80, no bands, patient afebrile  · Lactic acid normal   · Procalcitonin 1 79  · Chest x-ray showed no evidence of pneumonia  · UA no evidence of infection   · COVID-19, flu RSV negative  · CT chest showed -  Numerous new pulmonary nodules, concerning for metastatic disease;Mediastinal and hilar lymphadenopathy, also concerning for metastases; Small right pleural effusion with adjacent basal opacity, most likely represents atelectasis  · Patient was given Zosyn in the ED and was later de-escalated to Rocephin  Later antibiotics were changed to Ancef 2 g every 8 hours  · Blood cultures 2 out of 2 growing Methicillin sensitive Staphylococcus aureus  Bacterial identification panel showing MSSA     · ID input appreciated  · Echocardiogram showed EF of 60% with severe concentric hypertrophy, EF of 60% without any visible vegetations with severe attic stenosis  · Patient underwent MARY which showed EF of 55% without any large vegetation but cannot rule out small vegetation due to calcium and acoustic shadowing  · Discussed in detail with ID, family and cardiology on 12/8/22  · Unclear source-possible from skin translocation as patient noted to have multiple scabs  · CAT scan showing multiple lung nodules-questionable septic emboli  · Pacemaker removal was recommended by ID in the setting of Staphylococcus bacteremia regardless of the primary source -discussed with cardiology  Pacemaker has been in place for 12 years and removal would be an extensive surgical procedure  Given multiple comorbidities cardiology recommending conservative approach and treatment with IV antibiotics with repeat blood cultures  · Repeat blood cultures from 12/7/2022 with no growth at 72 hours  · Patient noted to have worsening white count over the past 2 days which is again improving  · Anticipate course of 6 weeks of antibiotics from the date of blood culture clearance  · Patient ordered for PICC line placement    Acute encephalopathy  Assessment & Plan  Likely acute toxic metabolic encephalopathy due to underlying infection, rule out CVA  Patient reports sleepy and feeling weak, oriented to place and person day of week on exam   · CT head showed - Hypodensity in the right parieto-occipital region most likely represents a chronic infarct, however no prior study is available to confirm this    Also differential could be metastatic disease based on the CT of the chest  · No focal neuro deficit on exam   Mental status has improved significantly with treatment of sepsis  · ABG no hypercapnia  · Patient was initially worked up for possible stroke  · Neurology input appreciated  · Continue Xarelto 15 mg p o  daily and Zocor 20 mg p o  daily  · Patient could not get MRI of the brain due to incompatible pacemaker  · Was given full dose aspirin in the ED along with Lipitor 80 mg p o  daily and was continued on Lipitor 40 mg p o  daily  · Hemoglobin A1c was 5 1  · Will repeat  CT of the brain tomorrow after 1 week of initial CAT scan  · PT/OT evaluation and treatment    Abnormal CAT scan  Assessment & Plan  CT Chest showed numerous new pulmonary nodules concerning for metastatic disease along with mediastinal and hilar lymphadenopathy and small right pleural effusion  · Patient had CT abdomen p bon without contrast on 11/19/2022 which was unremarkable  · CT of the brain also showing hypodensity in the right parieto-occipital region which might represent chronic infarct/metastasis  · Pulmonary was consulted who recommendedIR guided lung biopsy  · Patient did have a colonoscopy during prior hospitalization which showed no masses  · No history of malignancy as per family  · Bilateral lung nodules could also be related to septic emboli  Patient might need repeat CAT scan in 3 months  If the nodules are improving patient might not need further work-up but if the nodules are persistent might need biopsy      Elevated troponin  Assessment & Plan  Mild troponin elevation most likely non MI troponin elevation secondary to sepsis  · EKG showed paced rhythm  · Patient also complained of some chest pain on 12/8/22 and repeat troponin remains around 700  · Repeat EKG showed some inferolateral T wave inversion  · Suspected secondary to symptomatic anemia  Symptoms have improved after 1 unit of PRBC transfusion      Chronic diastolic heart failure [facility])  Assessment & Plan  Wt Readings from Last 3 Encounters:   12/07/22 120 kg (265 lb)   11/22/22 121 kg (265 lb 10 5 oz)   10/20/22 120 kg (265 lb)     On Demadex 40 mg p o  Daily at home  · 2D echo in January 2022 showed normal EF, 65%, moderate concentric hypertrophy, dilated atriums, severe AS, mild to moderate AR, moderate MS  · Chest X-ray showed cardiomegaly with moderate congestive changes  · CT chest showed numerous new pulmonary nodules with mediastinal and hilar lymphadenopathy small right pleural effusion  · 1-2+ lower leg edema on exam, albumin 2 6  · BNP was elevated at 14,000  DemaDex was held initially in the setting of sepsis which was later restarted    Patient also received dose of Lasix with blood transfusion and admitted with a dose of Lasix 40 mg on 12/9/2022 for fluid overload with improved symptoms  Demadex held in the setting of worsening BUN/creatinine  Might need to accept higher creatinine to maintain euvolemic status      Depression with anxiety  Assessment & Plan  Continue Cymbalta    Anemia  Assessment & Plan  Baseline hemoglobin appears to be around 7-9s  · Hemoglobin 9 1, repeat 8 1 in ED  No evidence of active bleeding  · Globin continues to drop and is down to 7 3  · Received 1 unit PRBC transfusion with improved hemoglobin which is now up to 8 3  · Stool for occult blood was negative x1  · History of symptomatic anemia during prior hospitalization  Patient had EGD which showed no sign of upper GI bleeding with mild gastritis and Candida esophagitis  Colonoscopy also showed no evidence of bleeding  Patient was recommended to follow-up outpatient with GI for capsule endoscopy  Moderate to severe aortic stenosis  Assessment & Plan  Severe AS on echo in 1/2022  Underwent MARY which showed mild to moderate concentric hypertrophy with EF of 55% with moderately thickened aortic valve with severe aortic stenosis    Gastro-esophageal reflux disease without esophagitis  Assessment & Plan  Continue PPI    COPD (chronic obstructive pulmonary disease) ([facility])  Assessment & Plan  On Trelegy, albuterol nebulizer p r n  At home  · Was initially on 2 L oxygen but currently stable on room air  · Substitute Trelegy with Breo and Incruse  · Continue albuterol as needed      Obesity (BMI 30-39  9)  Assessment & Plan  Body mass index is 34 02 kg/m²  · Diet and lifestyle modification    CAD (coronary artery disease)  Assessment & Plan  Status post PCI with stents x 2  On Imdur, statin at home    · Continue Imdur, statin and Xarelto  · Patient was recommended to stop Plavix during prior admission      Stage 3a chronic kidney disease [facility])  Assessment & Plan  Lab Results   Component Value Date EGFR 34 12/12/2022    EGFR 35 12/11/2022    EGFR 39 12/10/2022    CREATININE 1 81 (H) 12/12/2022    CREATININE 1 74 (H) 12/11/2022    CREATININE 1 61 (H) 12/10/2022     Baseline creatinine appears to be around 1 3-1 6s  · Creatinine stable  · Continue Demadex 40 mg p o  daily  Patient received a dose of Lasix with blood transfusion  · Patient was given another dose of Lasix 40 mg on 12/9/2022  · Dex held as BUN/creatinine are getting higher    Hyponatremia  Assessment & Plan  Results from last 7 days   Lab Units 12/12/22  0442 12/11/22  0643 12/10/22  0611 12/09/22  0515 12/08/22  0614 12/07/22  0516 12/06/22  0834 12/05/22  1919   SODIUM mmol/L 130* 134* 131* 133* 131* 130* 133* 135   In the setting of volume overload  Patient has been on Demadex 40 mg p o  daily which is held in the setting of worsening BUN and creatinine  Sodium level has improved and again dropped to 130  Continue 1500 mL fluid restriction    JOVI (obstructive sleep apnea)  Assessment & Plan  Continue CPAP at HS    Hyperlipidemia  Assessment & Plan  Continue statin    Chronic a-fib [facility])  Assessment & Plan  Status post pacemaker insertion  On Xarelto at home  · Continue Xarelto      Benign prostatic hyperplasia  Assessment & Plan  Continue Proscar  · Monitor postvoid residual    Benign hypertension with chronic kidney disease, stage III ([address] )  Assessment & Plan  On Demadex at home  · Demadex  was held initially in the setting of sepsis which was restarted  Continue Demadex 40 mg p o  daily  · Patient also received a dose of Lasix with blood transfusion   · Patient was given another dose of Lasix 40 mg IV on 12/9/22  · Demadex held in the setting of increasing BUN and creatinine          VTE Pharmacologic Prophylaxis:   High Risk (Score >/= 5) - Pharmacological DVT Prophylaxis Ordered: rivaroxaban (Xarelto)  Sequential Compression Devices Ordered  Patient Centered Rounds: I performed bedside rounds with nursing staff today    Discussions with Specialists or Other Care Team Provider: Cardiology    Education and Discussions with Family / Patient: Updated  (daughter) at bedside  Time Spent for Care: 45 minutes  More than 50% of total time spent on counseling and coordination of care as described above  Current Length of Stay: 6 day(s)  Current Patient Status: Inpatient   Certification Statement: The patient will continue to require additional inpatient hospital stay due to MSSA bacteremia  Discharge Plan: Anticipate discharge in 24-48 hrs to rehab facility  Code Status: Level 1 - Full Code    Subjective:   Patient has been sleeping with flex neck most of the day per daughter  Patient denies any significant neck pain  Denies any chest pain, shortness of breath, abdominal pain, nausea or vomiting    Objective:     Vitals:   Temp (24hrs), Av 6 °F (36 4 °C), Min:97 3 °F (36 3 °C), Max:97 9 °F (36 6 °C)    Temp:  [97 3 °F (36 3 °C)-97 9 °F (36 6 °C)] 97 9 °F (36 6 °C)  HR:  [70-79] 79  Resp:  [18-20] 18  BP: ()/(60-71) 98/60  SpO2:  [93 %-100 %] 99 %  Body mass index is 34 02 kg/m²  Input and Output Summary (last 24 hours): Intake/Output Summary (Last 24 hours) at 2022 1446  Last data filed at 2022 0537  Gross per 24 hour   Intake --   Output 800 ml   Net -800 ml       Physical Exam:   Physical Exam  Constitutional:       Appearance: Normal appearance  HENT:      Head: Normocephalic and atraumatic  Eyes:      Extraocular Movements: Extraocular movements intact  Pupils: Pupils are equal, round, and reactive to light  Cardiovascular:      Rate and Rhythm: Normal rate  Rhythm irregular  Heart sounds: No murmur heard  No gallop  Pulmonary:      Effort: Pulmonary effort is normal       Breath sounds: Normal breath sounds  Abdominal:      General: Bowel sounds are normal       Palpations: Abdomen is soft  Tenderness: There is no abdominal tenderness     Musculoskeletal: General: No swelling or deformity  Normal range of motion  Cervical back: Normal range of motion and neck supple  Right lower leg: Edema present  Left lower leg: Edema present  Comments: Bilateral +1 pedal edema  No spinal or paraspinal tenderness in the cervical region   Skin:     General: Skin is warm and dry  Neurological:      General: No focal deficit present  Mental Status: He is alert  Additional Data:     Labs:  Results from last 7 days   Lab Units 12/12/22  0442 12/11/22  0643 12/10/22  0611   WBC Thousand/uL 12 77*   < > 13 62*   HEMOGLOBIN g/dL 8 3*   < > 7 9*   HEMATOCRIT % 27 0*   < > 25 4*   PLATELETS Thousands/uL 227   < > 170   BANDS PCT % 4  --   --    NEUTROS PCT %  --   --  85*   LYMPHS PCT %  --   --  6*   LYMPHO PCT % 7*  --   --    MONOS PCT %  --   --  7   MONO PCT % 5  --   --    EOS PCT % 0  --  0    < > = values in this interval not displayed  Results from last 7 days   Lab Units 12/12/22  0442 12/06/22  0834 12/05/22  1919   SODIUM mmol/L 130*   < > 135   POTASSIUM mmol/L 4 3   < > 3 7   CHLORIDE mmol/L 93*   < > 96   CO2 mmol/L 27   < > 29   BUN mg/dL 43*   < > 32*   CREATININE mg/dL 1 81*   < > 1 65*   ANION GAP mmol/L 10   < > 10   CALCIUM mg/dL 8 8   < > 8 6   ALBUMIN g/dL  --   --  2 6*   TOTAL BILIRUBIN mg/dL  --   --  1 70*   ALK PHOS U/L  --   --  85   ALT U/L  --   --  23   AST U/L  --   --  33   GLUCOSE RANDOM mg/dL 166*   < > 140    < > = values in this interval not displayed  Results from last 7 days   Lab Units 12/05/22  1948   INR  1 74*         Results from last 7 days   Lab Units 12/06/22  0104   HEMOGLOBIN A1C % 5 1     Results from last 7 days   Lab Units 12/07/22  0516 12/06/22 0104 12/05/22 1948   LACTIC ACID mmol/L  --   --  1 8   PROCALCITONIN ng/ml 1 80* 1 79*  --        Lines/Drains:  Invasive Devices     Peripheral Intravenous Line  Duration           Peripheral IV 12/08/22 Dorsal (posterior); Left Forearm 3 days Imaging: Reviewed radiology reports from this admission including: chest xray, chest CT scan and CT head    Recent Cultures (last 7 days):   Results from last 7 days   Lab Units 12/07/22  0549 12/06/22  2209 12/05/22  1948   BLOOD CULTURE  No Growth After 5 Days  No Growth After 5 Days    --  Staphylococcus aureus*  Staphylococcus aureus*   GRAM STAIN RESULT   --   --  Gram positive cocci in clusters*  Gram positive cocci in clusters*   LEGIONELLA URINARY ANTIGEN   --  Negative  --        Last 24 Hours Medication List:   Current Facility-Administered Medications   Medication Dose Route Frequency Provider Last Rate   • acetaminophen  650 mg Oral Q6H PRN NAVA Coleman     • albuterol  2 5 mg Nebulization Q6H PRN NAVA Coleman     • allopurinol  200 mg Oral Daily NAVA Coleman     • atorvastatin  40 mg Oral Daily With NAVA French     • cefazolin  2,000 mg Intravenous Q8H Vane Dalal MD 2,000 mg (12/12/22 1301)   • docusate sodium  100 mg Oral BID Ron Ellis MD     • DULoxetine  60 mg Oral BID NAVA Coleman     • finasteride  5 mg Oral Daily NAVA Coleman     • fluticasone-vilanterol  1 puff Inhalation Daily NAVA Coleman     • gabapentin  100 mg Oral HS NAVA Coleman     • isosorbide mononitrate  30 mg Oral Daily NAVA Coleman     • ondansetron  4 mg Intravenous Q6H PRN NAVA Coleman     • pantoprazole  40 mg Oral Early Morning NAVA Coleman     • polyethylene glycol  17 g Oral Daily PRN NAVA Coleman     • polyethylene glycol  17 g Oral Daily Vane Dalal MD     • rivaroxaban  15 mg Oral Daily With Breakfast NAVA Coleman     • saccharomyces boulardii  250 mg Oral BID NAVA Coleman     • senna  1 tablet Oral HS Vane Dalal MD     • umeclidinium  1 puff Inhalation Daily NAVA Coleman          Today, Patient Was Seen By: Vane Dalal MD    **Please Note: This note may have been constructed using a voice recognition system  **

## 2022-12-12 NOTE — BRIEF OP NOTE (RAD/CATH)
INTERVENTIONAL RADIOLOGY PROCEDURE NOTE    Date: 12/12/2022    Procedure: IR TUNNELED CENTRAL LINE PLACEMENT     Preoperative diagnosis:   1  Altered mental status    2  Generalized weakness    3  Acute encephalopathy    4  Gram-positive bacteremia    5  Metastatic disease ([facility] [address] )    6  Elevated troponin    7  Sepsis ([facility] )         Postoperative diagnosis: Same  Surgeon: Mauricio Mariee MD     Assistant: None  No qualified resident was available  Blood loss: Minimal    Specimens: None    Findings: Successful right IJ tunnel central venous catheter placement    Complications: None immediate      Anesthesia: conscious sedation

## 2022-12-12 NOTE — PLAN OF CARE
Problem: PHYSICAL THERAPY ADULT  Goal: Performs mobility at highest level of function for planned discharge setting  See evaluation for individualized goals  Description: Treatment/Interventions: ADL retraining, Functional transfer training, LE strengthening/ROM, Therapeutic exercise, Endurance training, Bed mobility, Gait training, Spoke to nursing  Equipment Recommended:  (pt has a walker and a wheel chair)       See flowsheet documentation for full assessment, interventions and recommendations  Outcome: Progressing  Note: Prognosis: Good  Problem List: Decreased strength, Decreased endurance, Impaired balance, Decreased mobility, Decreased safety awareness, Impaired judgement, Pain  Assessment: Pt agreeable to PT session this AM  Pt received ambulating to bathroom with assist from RN with gait deviations as mentioned above - requires verbal + tactile cues with difficulty correcting  Pt assisted to stand from toilet x Mod A using RW + grab bars and able to tolerate standing for approx 1-2 minutes - requesting to return to sitting on toilet due to fatigue  Bedside chair repositioned approx 5 feet from bathroom and able to ambulate Min x 2 person to chair with continued gait deviations  Repositioned chair in slight recline with pillows to assist with positioning to decrease neck pain  Able to perform gentle exercise in chair with fatigue reported  Discussed str with patient and family due to safety/mobility concerns - patient still reports wanting to go home  PT Discharge Recommendation: Post acute rehabilitation services    See flowsheet documentation for full assessment

## 2022-12-12 NOTE — ASSESSMENT & PLAN NOTE
Wt Readings from Last 3 Encounters:   12/07/22 120 kg (265 lb)   11/22/22 121 kg (265 lb 10 5 oz)   10/20/22 120 kg (265 lb)     On Demadex 40 mg p o  Daily at home  · 2D echo in January 2022 showed normal EF, 65%, moderate concentric hypertrophy, dilated atriums, severe AS, mild to moderate AR, moderate MS  · Chest X-ray showed cardiomegaly with moderate congestive changes  · CT chest showed numerous new pulmonary nodules with mediastinal and hilar lymphadenopathy small right pleural effusion  · 1-2+ lower leg edema on exam, albumin 2 6  · BNP was elevated at 14,000  DemaDex was held initially in the setting of sepsis which was later restarted  Patient also received dose of Lasix with blood transfusion and admitted with a dose of Lasix 40 mg on 12/9/2022 for fluid overload with improved symptoms  Demadex held in the setting of worsening BUN/creatinine    Might need to accept higher creatinine to maintain euvolemic status

## 2022-12-12 NOTE — PHYSICAL THERAPY NOTE
PT TREATMENT       12/12/22 1205   Note Type   Note Type Treatment   Pain Assessment   Pain Assessment Tool 0-10   Pain Score 5   Pain Location/Orientation Location: Neck   Pain Onset/Description Frequency: Intermittent; Onset: Ongoing   Patient's Stated Pain Goal No pain   Hospital Pain Intervention(s) Rest;Repositioned   Multiple Pain Sites No   Restrictions/Precautions   Weight Bearing Precautions Per Order No   Other Precautions Bed Alarm; Chair Alarm; Fall Risk;Pain   General   Chart Reviewed Yes   Family/Caregiver Present Yes  (dtr Saintclair Sleigh at bedside)   Cognition   Overall Cognitive Status [facility]   Arousal/Participation Cooperative   Attention Attends with cues to redirect   Orientation Level Oriented to person;Oriented to place;Oriented to time   Following Commands Follows multistep commands with increased time or repetition   Subjective   Subjective "i'm going home"   Bed Mobility   Additional Comments Pt received sitting in bedside chair   Transfers   Sit to Stand 3  Moderate assistance   Additional items Assist x 1;Verbal cues   Stand to Sit 3  Moderate assistance   Additional items Assist x 1;Verbal cues   Stand pivot 4  Minimal assistance   Additional items Assist x 1;Verbal cues   Toilet transfer 3  Moderate assistance   Additional items Assist x 1;Verbal cues   Ambulation/Elevation   Gait pattern Forward Flexion; Wide CASSIE;Step to;Short stride; Foward flexed   Gait Assistance 4  Minimal assist   Additional items Assist x 2;Verbal cues   Assistive Device Rolling walker   Distance 15 feet + 5 feet   Stair Management Assistance Not tested   Balance   Static Sitting Fair   Dynamic Sitting Fair -   Static Standing Fair -   Dynamic Standing Poor +   Ambulatory Poor +   Activity Tolerance   Activity Tolerance Patient tolerated treatment well;Patient limited by fatigue   Nurse Made Aware yes JOSE Maddi   Exercises   Neuro re-ed Pt able to perform seated exercise including ankle pumps, LAQ, quad sets x 10-20 reps bilat  (requires intermittent rest breaks due to fatigue)   Assessment   Prognosis Good   Problem List Decreased strength;Decreased endurance; Impaired balance;Decreased mobility; Decreased safety awareness; Impaired judgement;Pain   Assessment Pt agreeable to PT session this AM  Pt received ambulating to bathroom with assist from RN with gait deviations as mentioned above - requires verbal + tactile cues with difficulty correcting  Pt assisted to stand from toilet x Mod A using RW + grab bars and able to tolerate standing for approx 1-2 minutes - requesting to return to sitting on toilet due to fatigue  Bedside chair repositioned approx 5 feet from bathroom and able to ambulate Min x 2 person to chair with continued gait deviations  Repositioned chair in slight recline with pillows to assist with positioning to decrease neck pain  Able to perform gentle exercise in chair with fatigue reported  Discussed str with patient and family due to safety/mobility concerns - patient still reports wanting to go home  The patient's AM-[facility] Basic Mobility Inpatient Short Form Raw Score is 13  A Raw score of less than or equal to 16 suggests the patient may benefit from discharge to post-acute rehabilitation services  Please also refer to the recommendation of the Physical Therapist for safe discharge planning  Goals   Patient Goals to go home   Plan   Treatment/Interventions ADL retraining;Functional transfer training;LE strengthening/ROM; Therapeutic exercise; Endurance training;Bed mobility;Gait training;Spoke to nursing   Progress Slow progress, decreased activity tolerance   PT Frequency Other (Comment)  (5x/week)   Recommendation   PT Discharge Recommendation Post acute rehabilitation services   AM-PAC Basic Mobility Inpatient   Turning in Bed Without Bedrails 3   Lying on Back to Sitting on Edge of Flat Bed 2   Moving Bed to Chair 2   Standing Up From Chair 2   Walk in Room 3   Climb 3-5 Stairs 1   Basic Mobility Inpatient Raw Score 13   Basic Mobility Standardized Score 33 99   Highest Level Of Mobility   -[facility] Goal 4: Move to chair/commode   -HL Achieved 6: Walk 10 steps or more   Education   Education Provided Mobility training;Home exercise program   Patient Explanation/teachback used; Reinforcement needed   End of Consult   Patient Position at End of Consult Bedside chair; All needs within reach   [facility] Insurance Number  Danika Allison [id number]

## 2022-12-12 NOTE — ASSESSMENT & PLAN NOTE
Results from last 7 days   Lab Units 12/12/22  0442 12/11/22  0643 12/10/22  0611 12/09/22  0515 12/08/22  0614 12/07/22  0516 12/06/22  0834 12/05/22  1919   SODIUM mmol/L 130* 134* 131* 133* 131* 130* 133* 135   In the setting of volume overload  Patient has been on Demadex 40 mg p o  daily which is held in the setting of worsening BUN and creatinine  Sodium level has improved and again dropped to 130    Continue 1500 mL fluid restriction

## 2022-12-12 NOTE — ASSESSMENT & PLAN NOTE
On Demadex at home  · Demadex  was held initially in the setting of sepsis which was restarted  Continue Demadex 40 mg p o  daily    · Patient also received a dose of Lasix with blood transfusion   · Patient was given another dose of Lasix 40 mg IV on 12/9/22  · Demadex held in the setting of increasing BUN and creatinine

## 2022-12-12 NOTE — PROGRESS NOTES
Progress Note - Infectious Disease   Valentine Holloway 80 y o  male MRN: [id number]  Unit/Bed#: [facility] Encounter: [id number]      Impression/Plan:    1  MSSA bacteremia  POA, 2/2 admission blood cultures growing MSSA  Unclear source, may be skin translocation as the patient has multiple scabs on his arms and legs  This is complicated by the presence of a PPM  TTE with AV and MV thickening, no clear vegetations  MARY with no obvious large vegetations, but given significant calcification cannot rule out small lesion  CT chest with numerous pulmonary nodules, could be septic emboli vs less likely metastatic disease  This does raise concern for a primary endovascular source of infection  Discussed treatment options with the family and Cardiology  PPM removal is recommended in the setting of staph bacteremia regardless of the primary source, however the pacemaker has been in place 12 years and removal would be an extensive surgical procedure  Given his comorbidities, there are risks to surgery  His family would like to pursue a more conservative approach with antibiotics before any surgery  Repeat blood cultures no growth  -continue IV Cefazolin 2g q8hr              -PICC placement   -recommend a 6 week course of antibiotics from blood culture clearance through 1/17/23 (script provided to CM)   -Weekly CBC with differential and BMP on IV antibiotics   -will need surveillance blood cultures after completion of antibiotic course   -if there is recurrence the patient will require pacemaker extraction   -will arrange ID follow-up 2 weeks after discharge     2  Weakness, encephalopathy  Likely toxic metabolic due to infection as above  CTH showed a hypodensity in the right parieto-occipital region most likely a chronic infarct or metastatic lesion  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   Encephalopathy has improved  The patient appears depressed with flat affect    Noted weakness holding his head up today, but no neck pain  CT of the cervical spine shows no abnormalities  -treatment of infection as above              -neurology follow up              -monitor mental status     3  Leukocytosis  Likely due to #1 above  Patient is afebrile, hemodynamically stable               -antibiotics as above              -monitor WBC count, fever curve     4  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  Complicated by staph aureus bacteremia  MARY no clear vegetations    -management as above     5  Pulmonary nodules and adenopathy  CT chest shows numerous new pulmonary nodules and mediastinal and hilar lymphadenopathy, concerning for metastases however in setting of staph aureus bacteremia could be septic emboli  Patient had a colonoscopy last month without abnormalities  He is a long term smoker  -pulmonary consulted   -will need serial imaging after 4-6 weeks of antibiotics; if persistent will require work up for malignancy     6  CKD  Creatinine stable at baseline  Antibiotics dose adjusted as needed      I have discussed the above management plan in detail with Dr George Pandey, the patient, and his daughter at bedside  ID will follow    Antibiotics:  Cefazolin day 4    Subjective: The patient is more alert today, feels better  No fever, chills, chest pain, shortness of breath  Tolerating IV antibiotics  Objective:  Vitals:  Temp:  [97 3 °F (36 3 °C)-97 9 °F (36 6 °C)] 97 9 °F (36 6 °C)  HR:  [67-79] 74  Resp:  [18-49] 36  BP: ()/(59-71) 115/59  SpO2:  [93 %-100 %] 97 %  Temp (24hrs), Av 6 °F (36 4 °C), Min:97 3 °F (36 3 °C), Max:97 9 °F (36 6 °C)  Current: Temperature: 97 9 °F (36 6 °C)    Physical Exam:   General Appearance:  He is non toxic appearing but chronically ill  Flat affect   Throat: Oropharynx moist without lesions      Lungs:   Clear to auscultation bilaterally; no wheezes, rhonchi or rales; respirations unlabored   Heart:  RRR; no murmur, rub or gallop   Abdomen:   Soft, non-tender, non-distended, positive bowel sounds  Extremities: No clubbing, cyanosis or edema   Skin: Multiple ecchymoses, scabs on his arms and left knee       Labs: All pertinent labs and imaging studies were personally reviewed  Results from last 7 days   Lab Units 12/12/22 0442 12/11/22  0643 12/10/22  0611   WBC Thousand/uL 12 77* 14 68* 13 62*   HEMOGLOBIN g/dL 8 3* 8 3* 7 9*   PLATELETS Thousands/uL 227 194 170     Results from last 7 days   Lab Units 12/12/22  0442 12/11/22  0643 12/10/22  0611 12/06/22  0834 12/05/22  1919   SODIUM mmol/L 130* 134* 131*   < > 135   POTASSIUM mmol/L 4 3 4 4 4 2   < > 3 7   CHLORIDE mmol/L 93* 97 95*   < > 96   CO2 mmol/L 27 27 28   < > 29   BUN mg/dL 43* 41* 38*   < > 32*   CREATININE mg/dL 1 81* 1 74* 1 61*   < > 1 65*   EGFR ml/min/1 73sq m 34 35 39   < > 38   CALCIUM mg/dL 8 8 8 5 8 5   < > 8 6   AST U/L  --   --   --   --  33   ALT U/L  --   --   --   --  23   ALK PHOS U/L  --   --   --   --  85    < > = values in this interval not displayed  Results from last 7 days   Lab Units 12/07/22  0516 12/06/22  0104   PROCALCITONIN ng/ml 1 80* 1 79*                   Micro:  Results from last 7 days   Lab Units 12/07/22  0549 12/06/22 2209 12/05/22 1948   BLOOD CULTURE  No Growth After 5 Days  No Growth After 5 Days  --  Staphylococcus aureus*  Staphylococcus aureus*   GRAM STAIN RESULT   --   --  Gram positive cocci in clusters*  Gram positive cocci in clusters*   LEGIONELLA URINARY ANTIGEN   --  Negative  --        Imaging:  I have personally reviewed pertinent imaging study reports and images in PACS      CT chest: numerous new pulmonary nodules and mediastinal and hilar lymphadenopathy, concerning for metastases     Other Studies:   I have personally reviewed pertinent reports

## 2022-12-12 NOTE — ASSESSMENT & PLAN NOTE
Patient presents with somnolence for 24 hours and generalized weakness  Patient reports a little cough and runny nose, denies sore throat/SOB/fever/chills  · Patient hospitalized in November 2022 for symptomatic anemia, hemoglobin 7 1 on admission with positive stool guaiac, received 1 unit of PRBC, underwent EGD and colonoscopy both showed no evidence of bleeding, patient was recommended outpatient capsule endoscopy which is still pending   Plavix was discontinued on discharge, patient remained on Xarelto  · WBC 15 38, repeat 14 80, no bands, patient afebrile  · Lactic acid normal   · Procalcitonin 1 79  · Chest x-ray showed no evidence of pneumonia  · UA no evidence of infection   · COVID-19, flu RSV negative  · CT chest showed -  Numerous new pulmonary nodules, concerning for metastatic disease;Mediastinal and hilar lymphadenopathy, also concerning for metastases; Small right pleural effusion with adjacent basal opacity, most likely represents atelectasis  · Patient was given Zosyn in the ED and was later de-escalated to Rocephin  Later antibiotics were changed to Ancef 2 g every 8 hours  · Blood cultures 2 out of 2 growing Methicillin sensitive Staphylococcus aureus  Bacterial identification panel showing MSSA  · ID input appreciated  · Echocardiogram showed EF of 60% with severe concentric hypertrophy, EF of 60% without any visible vegetations with severe attic stenosis  · Patient underwent MARY which showed EF of 55% without any large vegetation but cannot rule out small vegetation due to calcium and acoustic shadowing  · Discussed in detail with ID, family and cardiology on 12/8/22  · Unclear source-possible from skin translocation as patient noted to have multiple scabs  · CAT scan showing multiple lung nodules-questionable septic emboli  · Pacemaker removal was recommended by ID in the setting of Staphylococcus bacteremia regardless of the primary source -discussed with cardiology    Pacemaker has been in place for 12 years and removal would be an extensive surgical procedure  Given multiple comorbidities cardiology recommending conservative approach and treatment with IV antibiotics with repeat blood cultures  · Repeat blood cultures from 12/7/2022 with no growth at 72 hours  · Patient noted to have worsening white count over the past 2 days which is again improving  · Anticipate course of 6 weeks of antibiotics from the date of blood culture clearance      · Patient ordered for PICC line placement

## 2022-12-12 NOTE — CASE MANAGEMENT
Case Management Discharge Planning Note    Patient name Vicente Cralton  Location [address] [address] [place] Coates MRN [id number]  : 1940 Date 2022       Current Admission Date: 2022  Current Admission Diagnosis:MSSA bacteremia   Patient Active Problem List    Diagnosis Date Noted   • Anemia 2022   • Depression with anxiety 2022   • MSSA bacteremia 2022   • Acute encephalopathy 2022   • Abnormal CAT scan 2022   • Elevated troponin 2022   • Left hip pain 2022   • Cervical strain 2022   • Lump of skin of left lower extremity 2022   • Primary osteoarthritis of both knees 2022   • Depression 2022   • Generalized weakness 2022   • Valvular heart disease 2022   • Pressure injury of right buttock, unstageable ([facility] ) 2022   • Ambulatory dysfunction 2022   • Dysphagia, pharyngoesophageal phase 2022   • Gastro-esophageal reflux disease without esophagitis 2022   • Generalized muscle weakness 2022   • History of falling 2022   • Iron deficiency anemia 2022   • Major depressive disorder, recurrent, unspecified ([facility] ) 2022   • Moderate to severe aortic stenosis 2022   • Other abnormalities of gait and mobility 2022   • Other specified polyneuropathies 2022   • Pulmonary hypertension due to left heart disease ([facility] ) 2022   • Presence of coronary angioplasty implant and graft 2022   • Unspecified hearing loss, unspecified ear 2022   • Chronic pain disorder 2022   • Fall 2022   • Closed fracture of right femur ([facility] ) 2022   • COPD (chronic obstructive pulmonary disease) ([facility] ) 2021   • Chronic diastolic heart failure ([facility] ) 2021   • Memory difficulty 2021   • Neuropathy 2021   • Coronary artery arteriosclerosis 2021   • Essential hypertension 2021   • Dyspnea on exertion    • Idiopathic hematuria 2021   • Kidney stone on left side 04/01/2021   • Flank pain 04/01/2021   • Obesity (BMI 30-39 9) 04/01/2021   • Chronic constipation 02/16/2021   • Vitamin D insufficiency 11/13/2020   • Symptomatic anemia 09/13/2020   • Former smoker 01/13/2020   • Nephrolithiasis 10/30/2019   • Bilateral leg edema 09/26/2019   • Hyperuricemia 07/22/2019   • Mixed simple and mucopurulent chronic bronchitis ([facility] ) 07/17/2019   • CAD (coronary artery disease) 05/13/2019   • Status post primary angioplasty with coronary stent 05/13/2019   • Stage 3a chronic kidney disease ([facility] ) 04/18/2019   • Hyponatremia 04/08/2019   • Serum total bilirubin elevated 04/08/2019   • Peripheral arteriosclerosis ([facility] ) 01/03/2019   • Pain in both lower extremities 01/31/2018   • Benign hypertension with chronic kidney disease, stage III ([facility]  ) 06/27/2017   • JOO (generalized anxiety disorder) 06/07/2016   • Chronic pain syndrome 03/17/2016   • Bilateral lumbar radiculopathy 03/17/2016   • Lumbar canal stenosis 03/17/2016   • Difficulty in walking 09/11/2015   • Persistent proteinuria 01/08/2015   • Urinary incontinence 10/30/2014   • Aneurysm of abdominal aorta 04/07/2014   • Centrilobular emphysema ([facility] ) 04/07/2014   • Deep venous insufficiency 04/07/2014   • Hyperlipidemia 04/07/2014   • Pacemaker 04/07/2014   • Fibromyalgia 08/08/2013   • 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/2013   • Fecal soiling 03/26/2013   • Class 2 obesity due to excess calories without serious comorbidity with body mass index (BMI) of 36 0 to 36 9 in adult 03/26/2013   • Chronic a-fib ([facility] ) 01/23/2013   • Allergic rhinitis 10/01/2012   • Benign prostatic hyperplasia 09/04/2012   • Carpal tunnel syndrome 09/04/2012   • Moderate or severe vision impairment, one eye 09/04/2012   • Smoker 09/04/2012   • JOVI (obstructive sleep apnea) 09/04/2012   • Venous insufficiency (chronic) (peripheral) 09/04/2012      LOS (days): 6  Geometric Mean LOS (GMLOS) (days): 4 60  Days to GMLOS:-1 4 OBJECTIVE:  Risk of Unplanned Readmission Score: 29 87         Current admission status: Inpatient   Preferred Pharmacy:   [facility] [id number] Nate Conde, [address] [facility] [address] [facility],  [address] [address]  Phone: [phone] Fax: [phone]    [address], [address] [place] [address] [address] [facility]  Phone: [phone] Fax: Leonel, [address]  [address] [facility] [address] [facility]  Phone: [phone] Fax: [phone]    [facility] Candie Reina [address] [place] [address]  Phone: [phone] Fax: [phone]    Primary Care Provider: Ana Wang DO    Primary Insurance: MEDICARE  Secondary Insurance: COMMERCIAL MISCELLANEOUS    DISCHARGE DETAILS:    Discharge planning discussed with[de-identified] patient and daughter Raiza Bro of Choice: Yes     CM contacted family/caregiver?: Yes  Were Treatment Team discharge recommendations reviewed with patient/caregiver?: Yes  Did patient/caregiver verbalize understanding of patient care needs?: N/A- going to facility  Were patient/caregiver advised of the risks associated with not following Treatment Team discharge recommendations?: Yes    Contacts  Patient Contacts: Desmond Timmons daughter  Relationship to Patient[de-identified] Family  Contact Method:  In Person  Reason/Outcome: Continuity of Care, Emergency Contact, Discharge [address]         Is the patient interested in [address] at discharge?: No    DME Referral Provided  Referral made for DME?: No    Other Referral/Resources/Interventions Provided:  Interventions: Short Term Rehab  Referral Comments: [facility] is able to accept tomorrow 12/13 for inpatient rehab      Treatment Team Recommendation: Short Term Rehab  Discharge Destination Plan[de-identified] Short Term Rehab  Transport at Discharge : BLS Ambulance      CM spoke with patient and oldest daughter Marcela Flanagan at the bedside  CM introduced self and role  CM discussed with patient and daughter discharge plan of care  Patient's daughter stated it took two nurses to move patient from chair to bathroom with a total time of 7 minutes  Daughter requesting to move forward with inpatient rehab  Patient is planned for PICC line today for long term IV ABX and XR of neck  Daughter and patient aware CM will update [address] [place] [address]  with plan of care  CM will f/u tomorrow with patient/family re:transport at discharge/time  All questions/concerns answered at this time  CM spoke with Maria G Gannon from [address] [place] [address]  inpatient rehab  [address] [place] [address]  has a bed for patient tomorrow 12/13  CM faxed IV ABX script and labs to [address] [place] [address]  at [phone]  CM provided update to [facility]  CM to f/u in AM with [address] [place] [address]  DCP

## 2022-12-12 NOTE — OCCUPATIONAL THERAPY NOTE
Occupational Therapy Treatment Note       12/12/22 1018   Note Type   Note Type Treatment   Pain Assessment   Pain Assessment Tool 0-10   Pain Score 6   Pain Location/Orientation Location: Neck   Restrictions/Precautions   Other Precautions Chair Alarm; Bed Alarm; Fall Risk;Pain   ADL   Grooming Assistance 5  Supervision/Setup   Grooming Deficit Wash/dry hands; Wash/dry face   Grooming Comments While seated in chair   LB Dressing Assistance 1  Total Assistance   LB Dressing Deficit Don/doff R sock; Don/doff L sock   LB Dressing Comments Limited today by increasd fatigue, increased neck pain   Functional Standing Tolerance   Time approx  30 seconds x 3 trials   Activity Static standing to RW in preparation for standing ADLs and increased participation in functional transfers ; significant rest breaks between stands due to fatigue, increased posterior lean upon standing requiring mod verbal cues for safe upright posture   Bed Mobility   Additional Comments Not assessed, pt received seated in bedside chair   Transfers   Sit to Stand 3  Moderate assistance  (Mod-max assist first trial)   Additional items Assist x 1;Verbal cues   Stand to Sit 3  Moderate assistance   Additional items Assist x 1;Verbal cues   Additional Comments STS trials from standard chair using RW x 3 trials; extended seated rest breaks between trials due to increased pain   Cognition   Overall Cognitive Status [facility]   Arousal/Participation Alert; Cooperative   Attention Attends with cues to redirect   Orientation Level Oriented to person;Oriented to place;Oriented to situation   Following Commands Follows multistep commands with increased time or repetition   Activity Tolerance   Activity Tolerance Patient limited by fatigue; Other (Comment)  (Limited by generalized weakness)   Assessment   Assessment Patient seen for OT treatment this AM  Patient received seated in standard chair with neck in significant flexion   Patient repositioned, however continues to hold neck in flexion  Recommended transfer to recliner chair with able to recline seat to adjust neck position however patient adamantly declined  Patient requiring significantly more assistance with ADLs and mobility as compared to initial evaluation  Limited by neck pain and generalized fatigue  Required increased time to complete simple ADL tasks and frequent rest breaks  The patient's raw score on the AM-PAC Daily Activity inpatient short form is 12, standardized score is 30 6, less than 39 4  Patients at this level are likely to benefit from discharge to post-acute rehabilitation services  Please refer to the recommendation of the Occupational Therapist for safe discharge planning  At end of session, patient seated in chair with all needs met  Daughter at bedside     Plan   OT Frequency 3-5x/wk   Recommendation   OT Discharge Recommendation Post acute rehabilitation services   AM-PAC Daily Activity Inpatient   Lower Body Dressing 1   Bathing 1   Toileting 2   Upper Body Dressing 2   Grooming 3   Eating 3   Daily Activity Raw Score 12   Daily Activity Standardized Score (Calc for Raw Score >=11) 30 6   AM-PAC Applied Cognition Inpatient   Following a Speech/Presentation 3   Understanding Ordinary Conversation 4   Taking Medications 3   Remembering Where Things Are Placed or Put Away 4   Remembering List of 4-5 Errands 3   Taking Care of Complicated Tasks 3   Applied Cognition Raw Score 20   Applied Cognition Standardized Score 99 54   Licensure   [place] License Number  Elizabeth Ratel, OTR/L [id number]

## 2022-12-13 LAB
ANION GAP SERPL CALCULATED.3IONS-SCNC: 10 MMOL/L (ref 4–13)
ANION GAP SERPL CALCULATED.3IONS-SCNC: 6 MMOL/L (ref 4–13)
BUN SERPL-MCNC: 48 MG/DL (ref 5–25)
BUN SERPL-MCNC: 54 MG/DL (ref 5–25)
CALCIUM SERPL-MCNC: 8.7 MG/DL (ref 8.3–10.1)
CALCIUM SERPL-MCNC: 8.8 MG/DL (ref 8.3–10.1)
CHLORIDE SERPL-SCNC: 92 MMOL/L (ref 96–108)
CHLORIDE SERPL-SCNC: 92 MMOL/L (ref 96–108)
CO2 SERPL-SCNC: 26 MMOL/L (ref 21–32)
CO2 SERPL-SCNC: 29 MMOL/L (ref 21–32)
CREAT SERPL-MCNC: 1.86 MG/DL (ref 0.6–1.3)
CREAT SERPL-MCNC: 1.97 MG/DL (ref 0.6–1.3)
ERYTHROCYTE [DISTWIDTH] IN BLOOD BY AUTOMATED COUNT: 20.6 % (ref 11.6–15.1)
GFR SERPL CREATININE-BSD FRML MDRD: 30 ML/MIN/1.73SQ M
GFR SERPL CREATININE-BSD FRML MDRD: 32 ML/MIN/1.73SQ M
GLUCOSE SERPL-MCNC: 165 MG/DL (ref 65–140)
GLUCOSE SERPL-MCNC: 170 MG/DL (ref 65–140)
HCT VFR BLD AUTO: 27.1 % (ref 36.5–49.3)
HGB BLD-MCNC: 8.3 G/DL (ref 12–17)
MCH RBC QN AUTO: 26.5 PG (ref 26.8–34.3)
MCHC RBC AUTO-ENTMCNC: 30.6 G/DL (ref 31.4–37.4)
MCV RBC AUTO: 87 FL (ref 82–98)
OSMOLALITY UR/SERPL-RTO: 285 MMOL/KG (ref 282–298)
PLATELET # BLD AUTO: 249 THOUSANDS/UL (ref 149–390)
PMV BLD AUTO: 9.8 FL (ref 8.9–12.7)
POTASSIUM SERPL-SCNC: 4.3 MMOL/L (ref 3.5–5.3)
POTASSIUM SERPL-SCNC: 4.4 MMOL/L (ref 3.5–5.3)
RBC # BLD AUTO: 3.13 MILLION/UL (ref 3.88–5.62)
SODIUM SERPL-SCNC: 127 MMOL/L (ref 135–147)
SODIUM SERPL-SCNC: 128 MMOL/L (ref 135–147)
URATE SERPL-MCNC: 8.3 MG/DL (ref 3.5–8.5)
WBC # BLD AUTO: 11.59 THOUSAND/UL (ref 4.31–10.16)

## 2022-12-13 RX ORDER — TORSEMIDE 20 MG/1
20 TABLET ORAL DAILY
Status: DISCONTINUED | OUTPATIENT
Start: 2022-12-13 | End: 2022-12-15

## 2022-12-13 RX ORDER — METHOCARBAMOL 500 MG/1
500 TABLET, FILM COATED ORAL ONCE
Status: COMPLETED | OUTPATIENT
Start: 2022-12-13 | End: 2022-12-13

## 2022-12-13 RX ORDER — METHOCARBAMOL 500 MG/1
500 TABLET, FILM COATED ORAL EVERY 6 HOURS PRN
Status: DISCONTINUED | OUTPATIENT
Start: 2022-12-13 | End: 2022-12-20 | Stop reason: HOSPADM

## 2022-12-13 RX ADMIN — DOCUSATE SODIUM 100 MG: 100 CAPSULE, LIQUID FILLED ORAL at 22:43

## 2022-12-13 RX ADMIN — Medication 250 MG: at 09:30

## 2022-12-13 RX ADMIN — DOCUSATE SODIUM 100 MG: 100 CAPSULE, LIQUID FILLED ORAL at 09:31

## 2022-12-13 RX ADMIN — UMECLIDINIUM 1 PUFF: 62.5 AEROSOL, POWDER ORAL at 09:31

## 2022-12-13 RX ADMIN — METHOCARBAMOL 500 MG: 500 TABLET ORAL at 13:23

## 2022-12-13 RX ADMIN — ALLOPURINOL 200 MG: 100 TABLET ORAL at 09:30

## 2022-12-13 RX ADMIN — FINASTERIDE 5 MG: 5 TABLET, FILM COATED ORAL at 09:30

## 2022-12-13 RX ADMIN — ISOSORBIDE MONONITRATE 30 MG: 30 TABLET, EXTENDED RELEASE ORAL at 09:31

## 2022-12-13 RX ADMIN — GABAPENTIN 100 MG: 100 CAPSULE ORAL at 22:42

## 2022-12-13 RX ADMIN — CEFAZOLIN SODIUM 2000 MG: 2 SOLUTION INTRAVENOUS at 13:23

## 2022-12-13 RX ADMIN — LIDOCAINE 5% 1 PATCH: 700 PATCH TOPICAL at 09:30

## 2022-12-13 RX ADMIN — CEFAZOLIN SODIUM 2000 MG: 2 SOLUTION INTRAVENOUS at 22:42

## 2022-12-13 RX ADMIN — Medication 250 MG: at 18:03

## 2022-12-13 RX ADMIN — ATORVASTATIN CALCIUM 40 MG: 40 TABLET, FILM COATED ORAL at 18:03

## 2022-12-13 RX ADMIN — SENNOSIDES 8.6 MG: 8.6 TABLET, FILM COATED ORAL at 22:42

## 2022-12-13 RX ADMIN — CEFAZOLIN SODIUM 2000 MG: 2 SOLUTION INTRAVENOUS at 05:20

## 2022-12-13 RX ADMIN — ONDANSETRON 4 MG: 2 INJECTION INTRAMUSCULAR; INTRAVENOUS at 11:50

## 2022-12-13 RX ADMIN — RIVAROXABAN 15 MG: 15 TABLET, FILM COATED ORAL at 09:30

## 2022-12-13 RX ADMIN — FLUTICASONE FUROATE AND VILANTEROL TRIFENATATE 1 PUFF: 200; 25 POWDER RESPIRATORY (INHALATION) at 09:31

## 2022-12-13 RX ADMIN — DULOXETINE HYDROCHLORIDE 60 MG: 60 CAPSULE, DELAYED RELEASE ORAL at 18:06

## 2022-12-13 RX ADMIN — TORSEMIDE 20 MG: 20 TABLET ORAL at 13:23

## 2022-12-13 RX ADMIN — PANTOPRAZOLE SODIUM 40 MG: 40 TABLET, DELAYED RELEASE ORAL at 05:20

## 2022-12-13 RX ADMIN — DULOXETINE HYDROCHLORIDE 60 MG: 60 CAPSULE, DELAYED RELEASE ORAL at 09:30

## 2022-12-13 NOTE — ASSESSMENT & PLAN NOTE
Lab Results   Component Value Date    EGFR 30 12/13/2022    EGFR 32 12/13/2022    EGFR 34 12/12/2022    CREATININE 1 97 (H) 12/13/2022    CREATININE 1 86 (H) 12/13/2022    CREATININE 1 81 (H) 12/12/2022     Baseline creatinine appears to be around 1 3-1 6s    · Creatinine stable  · Repeat lab work in AM   Monitor while on diuretic

## 2022-12-13 NOTE — PLAN OF CARE
Problem: Potential for Falls  Goal: Patient will remain free of falls  Description: INTERVENTIONS:  - Educate patient/family on patient safety including physical limitations  - Instruct patient to call for assistance with activity   - Consult OT/PT to assist with strengthening/mobility   - Keep Call bell within reach  - Keep bed low and locked with side rails adjusted as appropriate  - Keep care items and personal belongings within reach  - Initiate and maintain comfort rounds  - Make Fall Risk Sign visible to staff  - Offer Toileting every 2 Hours, in advance of need  - Initiate/Maintain alarm  - Obtain necessary fall risk management equipment  - Apply yellow socks and bracelet for high fall risk patients  - Consider moving patient to room near nurses station  Outcome: Progressing     Problem: RESPIRATORY - ADULT  Goal: Achieves optimal ventilation and oxygenation  Description: INTERVENTIONS:  - Assess for changes in respiratory status  - Assess for changes in mentation and behavior  - Position to facilitate oxygenation and minimize respiratory effort  - Oxygen administered by appropriate delivery if ordered  - Initiate smoking cessation education as indicated  - Encourage broncho-pulmonary hygiene including cough, deep breathe, Incentive Spirometry  - Assess the need for suctioning and aspirate as needed  - Assess and instruct to report SOB or any respiratory difficulty  - Respiratory Therapy support as indicated  Outcome: Progressing     Problem: MOBILITY - ADULT  Goal: Maintain or return to baseline ADL function  Description: INTERVENTIONS:  -  Assess patient's ability to carry out ADLs; assess patient's baseline for ADL function and identify physical deficits which impact ability to perform ADLs (bathing, care of mouth/teeth, toileting, grooming, dressing, etc )  - Assess/evaluate cause of self-care deficits   - Assess range of motion  - Assess patient's mobility; develop plan if impaired  - Assess patient's need for assistive devices and provide as appropriate  - Encourage maximum independence but intervene and supervise when necessary  - Involve family in performance of ADLs  - Assess for home care needs following discharge   - Consider OT consult to assist with ADL evaluation and planning for discharge  - Provide patient education as appropriate  Outcome: Progressing  Goal: Maintains/Returns to pre admission functional level  Description: INTERVENTIONS:  - Perform BMAT or MOVE assessment daily    - Set and communicate daily mobility goal to care team and patient/family/caregiver  - Collaborate with rehabilitation services on mobility goals if consulted  - Perform Range of Motion 3 times a day  - Reposition patient every 2 hours    - Dangle patient 3 times a day  - Stand patient 3 times a day  - Ambulate patient 3 times a day  - Out of bed to chair 3 times a day   - Out of bed for meals 3 times a day  - Out of bed for toileting  - Record patient progress and toleration of activity level   Outcome: Progressing

## 2022-12-13 NOTE — ASSESSMENT & PLAN NOTE
Baseline hemoglobin appears to be around 7-9s  · Hemoglobin 9 1, repeat 8 1 in ED  No evidence of active bleeding  · Received 1 unit PRBC transfusion during this admission  · Stool for occult blood was negative x1  · History of symptomatic anemia during prior hospitalization  Patient had EGD which showed no sign of upper GI bleeding with mild gastritis and Candida esophagitis  Colonoscopy also showed no evidence of bleeding  Patient was recommended to follow-up outpatient with GI for capsule endoscopy      Results from last 7 days   Lab Units 12/13/22  0519 12/12/22  0442 12/11/22  0643 12/10/22  0611 12/09/22  0515 12/08/22  0614 12/07/22  0516   HEMOGLOBIN g/dL 8 3* 8 3* 8 3* 7 9* 7 6* 7 3* 7 5*   HEMATOCRIT % 27 1* 27 0* 26 5* 25 4* 24 6* 23 7* 24 2*   MCV fL 87 87 85 86 87 86 87

## 2022-12-13 NOTE — PROGRESS NOTES
[facility] 45  Progress Note Damian Molina 1940, 80 y o  male MRN: [id number]  Unit/Bed#: [address] [place] [address] Encounter: [id number]  Primary Care Provider: Luciana Walker DO   Date and time admitted to hospital: 12/5/2022  6:39 PM    * MSSA bacteremia  Assessment & Plan  Patient presented with somnolence for 24 hours and generalized weakness  Reported a little cough and runny nose, denies sore throat/SOB/fever/chills  · Patient hospitalized in November 2022 for symptomatic anemia, hemoglobin 7 1 on admission with positive stool guaiac, received 1 unit of PRBC, underwent EGD and colonoscopy both showed no evidence of bleeding, patient was recommended outpatient capsule endoscopy which is still pending   Plavix discontinued on discharge, patient remained on Xarelto  · WBC 15 38, repeat 14 80, no bands, patient afebrile initially  · Lactic acid normal   · Procalcitonin elevated  · Chest x-ray showed no evidence of pneumonia  · UA no evidence of infection   · COVID-19, flu RSV negative  · CT chest showed -  Numerous new pulmonary nodules, concerning for metastatic disease; Mediastinal and hilar lymphadenopathy, also concerning for metastases; Small right pleural effusion with adjacent basal opacity, most likely represents atelectasis  · Patient was given Zosyn in the ED and was later de-escalated to Rocephin  Currently on Ancef 2 g every 8 hours  · Blood cultures 2 out of 2 growing Methicillin sensitive Staphylococcus aureus     · ID input appreciated  · Echocardiogram showed EF of 60% with severe concentric hypertrophy, EF of 60% without any visible vegetations with severe attic stenosis  · Patient underwent MARY which showed EF of 55% without any large vegetation but cannot rule out small vegetation due to calcium and acoustic shadowing  · Unclear source-possible from skin translocation as patient noted to have multiple scabs  · CT scan showing multiple lung nodules-questionable septic emboli  · Pacemaker removal was recommended by ID  per cardiology, pacemaker has been in place for 12 years and removal would be an extensive surgical procedure  Given multiple comorbidities cardiology recommending conservative approach and treatment with IV antibiotics with repeat blood cultures  · Repeat blood cultures from 12/7/2022 with no growth   · Anticipate course of 6 weeks of antibiotics from the date of blood culture clearance  · Status post PICC line placement    Acute encephalopathy  Assessment & Plan  Likely acute toxic metabolic encephalopathy due to underlying infection, rule out CVA  Patient reports sleepy and feeling weak, oriented to place and person day of week on exam   · CT head showed - Hypodensity in the right parieto-occipital region most likely represents a chronic infarct  Also differential could be metastatic disease based on the CT of the chest  · No focal neuro deficit on exam   Mental status has improved significantly with treatment of sepsis  · ABG no hypercapnia  · Patient was initially worked up for possible stroke  · Neurology input appreciated  · Continue Xarelto 15 mg p o  daily and Zocor 20 mg p o  daily  · Patient could not get MRI of the brain due to incompatible pacemaker  · Was given full dose aspirin in the ED along with Lipitor 80 mg p o  daily and was continued on Lipitor 40 mg p o  daily  · Hemoglobin A1c was 5 1  · Repeat CT head 12/12 which shows persistent small patchy hypodensities concerning for vasogenic edema related to metastatic foci  · PT/OT evaluation and treatment    Chronic diastolic heart failure (HCC)  Assessment & Plan  Wt Readings from Last 3 Encounters:   12/07/22 120 kg (265 lb)   11/22/22 121 kg (265 lb 10 5 oz)   10/20/22 120 kg (265 lb)     On Demadex 40 mg p o  Daily at home  · 2D echo in January 2022 showed normal EF, 65%, moderate concentric hypertrophy, dilated atriums, severe AS, mild to moderate AR, moderate MS    · Chest X-ray showed cardiomegaly with moderate congestive changes  · CT chest - numerous new pulmonary nodules with mediastinal and hilar lymphadenopathy small right pleural effusion  · 1-2+ lower leg edema on exam, albumin 2 6  BNP was elevated at 14,000  · Demadex was held initially in the setting of sepsis which was later restarted  Also received dose of Lasix on 12/8 and 12/9  · Demadex was then held held in the setting of worsening BUN/creatinine  Currently restarted on 20 mg of Demadex daily    Abnormal CAT scan  Assessment & Plan  CT Chest showed numerous new pulmonary nodules concerning for metastatic disease along with mediastinal and hilar lymphadenopathy and small right pleural effusion  · Patient had CT abdomen pelvis without contrast on 11/19/2022 which was unremarkable  · CT of the brain also showing hypodensity in the right parieto-occipital region which might represent chronic infarct/metastasis  Repeat CT as noted above  · Pulmonary was consulted who recommended IR guided lung biopsy  · Patient did have a colonoscopy during prior hospitalization which showed no masses  · No history of malignancy as per family  · Bilateral lung nodules could also be related to septic emboli  Will need repeat CT scan in 3 months  If the nodules are improving patient might not need further work-up but if the nodules are persistent might need biopsy      Elevated troponin  Assessment & Plan  Mild troponin elevation most likely non MI troponin elevation secondary to sepsis  · EKG showed paced rhythm  · Patient also complained of some chest pain on 12/8/22 and repeat troponin remains around 700  · Repeat EKG showed some inferolateral T wave inversion per prior provider  · Suspected secondary to symptomatic anemia    Symptoms improved after 1 unit of PRBC transfusion      Hyponatremia  Assessment & Plan  Results from last 7 days   Lab Units 12/13/22  1559 12/13/22  2781 12/12/22  0442 12/11/22  2983 12/10/22  9433 12/09/22  0515 12/08/22  0614 12/07/22  0516   SODIUM mmol/L 127* 128* 130* 134* 131* 133* 131* 130*     In the setting of volume overload  · Demadex was held in the setting of worsening creatinine but restarted again today  · Sodium level slowly trending down  Nephrology consulted  · Placed on 1200 mL fluid restriction    Depression with anxiety  Assessment & Plan  Continue Cymbalta    Anemia  Assessment & Plan  Baseline hemoglobin appears to be around 7-9s  · Hemoglobin 9 1, repeat 8 1 in ED  No evidence of active bleeding  · Received 1 unit PRBC transfusion during this admission  · Stool for occult blood was negative x1  · History of symptomatic anemia during prior hospitalization  Patient had EGD which showed no sign of upper GI bleeding with mild gastritis and Candida esophagitis  Colonoscopy also showed no evidence of bleeding  Patient was recommended to follow-up outpatient with GI for capsule endoscopy  Results from last 7 days   Lab Units 12/13/22  0519 12/12/22  0442 12/11/22  0643 12/10/22  0611 12/09/22  0515 12/08/22  0614 12/07/22  0516   HEMOGLOBIN g/dL 8 3* 8 3* 8 3* 7 9* 7 6* 7 3* 7 5*   HEMATOCRIT % 27 1* 27 0* 26 5* 25 4* 24 6* 23 7* 24 2*   MCV fL 87 87 85 86 87 86 87         Moderate to severe aortic stenosis  Assessment & Plan  Severe AS on echo in 1/2022  · Underwent MARY which showed mild to moderate concentric hypertrophy with EF of 55% with moderately thickened aortic valve with severe aortic valve stenosis    Gastro-esophageal reflux disease without esophagitis  Assessment & Plan  Continue PPI  COPD (chronic obstructive pulmonary disease) ([facility])  Assessment & Plan  On Trelegy, albuterol nebulizer p r n  At home  · Was initially on 2 L oxygen but currently stable on room air  · Substituted Trelegy with Breo and Incruse  · Continue albuterol as needed      Obesity (BMI 30-39  9)  Assessment & Plan  Body mass index is 34 02 kg/m²     · Diet and lifestyle modification    CAD (coronary artery disease)  Assessment & Plan  Status post PCI with stents x 2  On Imdur, statin at home  · Continue statin, Imdur and Xarelto  · Patient was recommended to stop Plavix during prior admission      Stage 3a chronic kidney disease [facility])  Assessment & Plan  Lab Results   Component Value Date    EGFR 30 12/13/2022    EGFR 32 12/13/2022    EGFR 34 12/12/2022    CREATININE 1 97 (H) 12/13/2022    CREATININE 1 86 (H) 12/13/2022    CREATININE 1 81 (H) 12/12/2022     Baseline creatinine appears to be around 1 3-1 6s  · Creatinine stable  · Repeat lab work in AM   Monitor while on diuretic    JOVI (obstructive sleep apnea)  Assessment & Plan  Continue CPAP at HS  Hyperlipidemia  Assessment & Plan  Continue statin    Chronic a-fib [facility])  Assessment & Plan  Status post pacemaker insertion  · Continue Xarelto      Benign prostatic hyperplasia  Assessment & Plan  Continue Proscar  · Monitor postvoid residual    Benign hypertension with chronic kidney disease, stage III (HCC)  Assessment & Plan  · Continue Demadex as noted above      VTE Pharmacologic Prophylaxis:   Xarelto    Patient Centered Rounds: I performed bedside rounds with nursing staff today  Discussions with Specialists or Other Care Team Provider: yes - ID, renal    Education and Discussions with Family / Patient: Updated  (daughter) at bedside  Time Spent for Care: 30 minutes  More than 50% of total time spent on counseling and coordination of care as described above  Current Length of Stay: 7 day(s)  Current Patient Status: Inpatient   Certification Statement: The patient will continue to require additional inpatient hospital stay due to Hyponatremia requiring adjustment of medications and monitoring of lab work  Discharge Plan: Anticipate discharge in 24-48 hrs to rehab facility      Code Status: Level 1 - Full Code    Subjective:     Patient reports neck pain leaning forward with his neck down  he denies any abdominal pain    Objective: Vitals:   Temp (24hrs), Av 1 °F (36 2 °C), Min:96 4 °F (35 8 °C), Max:97 9 °F (36 6 °C)    Temp:  [96 4 °F (35 8 °C)-97 9 °F (36 6 °C)] 96 4 °F (35 8 °C)  HR:  [67-78] 75  Resp:  [18-49] 18  BP: (107-133)/(59-73) 107/65  SpO2:  [95 %-100 %] 99 %  Body mass index is 34 02 kg/m²  Input and Output Summary (last 24 hours): Intake/Output Summary (Last 24 hours) at 2022 1023  Last data filed at 2022 0528  Gross per 24 hour   Intake 236 ml   Output 600 ml   Net -364 ml       Physical Exam:   Physical Exam  Vitals reviewed  Constitutional:       General: He is not in acute distress  Appearance: He is ill-appearing  HENT:      Head: Normocephalic and atraumatic  Eyes:      General:         Right eye: No discharge  Left eye: No discharge  Cardiovascular:      Rate and Rhythm: Normal rate and regular rhythm  Heart sounds: Murmur heard  Pulmonary:      Effort: Pulmonary effort is normal  No respiratory distress  Breath sounds: No wheezing or rales  Comments: Decreased breath sounds bilaterally  Abdominal:      General: Bowel sounds are normal  There is no distension  Palpations: Abdomen is soft  Tenderness: There is no abdominal tenderness  Musculoskeletal:      Right lower leg: Edema present  Left lower leg: Edema present  Skin:     Comments: Multiple areas of ecchymosis   Neurological:      Mental Status: He is alert and oriented to person, place, and time           Additional Data:     Labs:  Results from last 7 days   Lab Units 22  0519 22  0442 22  0643 12/10/22  0611   WBC Thousand/uL 11 59* 12 77*   < > 13 62*   HEMOGLOBIN g/dL 8 3* 8 3*   < > 7 9*   HEMATOCRIT % 27 1* 27 0*   < > 25 4*   PLATELETS Thousands/uL 249 227   < > 170   BANDS PCT %  --  4  --   --    NEUTROS PCT %  --   --   --  85*   LYMPHS PCT %  --   --   --  6*   LYMPHO PCT %  --  7*  --   --    MONOS PCT %  --   --   --  7   MONO PCT %  --  5  --   -- EOS PCT %  --  0  --  0    < > = values in this interval not displayed  Results from last 7 days   Lab Units 12/13/22  0519   SODIUM mmol/L 128*   POTASSIUM mmol/L 4 4   CHLORIDE mmol/L 92*   CO2 mmol/L 26   BUN mg/dL 48*   CREATININE mg/dL 1 86*   ANION GAP mmol/L 10   CALCIUM mg/dL 8 8   GLUCOSE RANDOM mg/dL 165*                 Results from last 7 days   Lab Units 12/07/22  0516   PROCALCITONIN ng/ml 1 80*       Lines/Drains:  Invasive Devices     Central Venous Catheter Line  Duration           CVC Central Lines 12/12/22 Single Right  <1 day                Central Line:  Goal for removal: N/A - Discharging with PICC for IV ABX/medications             Imaging: Reviewed radiology reports from this admission including: CT head and CT cervical spine    Recent Cultures (last 7 days):   Results from last 7 days   Lab Units 12/07/22  0549 12/06/22  2209   BLOOD CULTURE  No Growth After 5 Days  No Growth After 5 Days    --    LEGIONELLA URINARY ANTIGEN   --  Negative       Last 24 Hours Medication List:   Current Facility-Administered Medications   Medication Dose Route Frequency Provider Last Rate   • acetaminophen  650 mg Oral Q6H PRN NAVA Coleman     • albuterol  2 5 mg Nebulization Q6H PRN NAVA Coleman     • allopurinol  200 mg Oral Daily NAVA Coleman     • atorvastatin  40 mg Oral Daily With NAVA French     • cefazolin  2,000 mg Intravenous Q8H Arlette Pedro MD 2,000 mg (12/13/22 0520)   • docusate sodium  100 mg Oral BID Arlette Pedro MD     • DULoxetine  60 mg Oral BID NAVA Coleman     • finasteride  5 mg Oral Daily NAVA Coleman     • fluticasone-vilanterol  1 puff Inhalation Daily NAVA Coelman     • gabapentin  100 mg Oral HS NAVA Coleman     • isosorbide mononitrate  30 mg Oral Daily NAVA Coleman     • lidocaine  1 patch Topical Daily Shelby Mueller PA-C     • ondansetron  4 mg Intravenous Q6H PRN NAVA Coto     • pantoprazole  40 mg Oral Early Morning NAVA Coleman     • polyethylene glycol  17 g Oral Daily PRN NAVA Coleman     • polyethylene glycol  17 g Oral Daily Radha Nova MD     • rivaroxaban  15 mg Oral Daily With Breakfast NAVA Coleman     • saccharomyces boulardii  250 mg Oral BID NAVA Coleman     • senna  1 tablet Oral HS Radha Nova MD     • umeclidinium  1 puff Inhalation Daily NAVA Coleman          Today, Patient Was Seen By: Ramona Mclain DO    **Please Note: This note may have been constructed using a voice recognition system  **

## 2022-12-13 NOTE — PLAN OF CARE
Problem: Potential for Falls  Goal: Patient will remain free of falls  Description: INTERVENTIONS:  - Educate patient/family on patient safety including physical limitations  - Instruct patient to call for assistance with activity   - Consult OT/PT to assist with strengthening/mobility   - Keep Call bell within reach  - Keep bed low and locked with side rails adjusted as appropriate  - Keep care items and personal belongings within reach  - Initiate and maintain comfort rounds  - Make Fall Risk Sign visible to staff  - Offer Toileting every 2 Hours, in advance of need  - Initiate/Maintain alarm  - Obtain necessary fall risk management equipment  - Apply yellow socks and bracelet for high fall risk patients  - Consider moving patient to room near nurses station  Outcome: Progressing     Problem: RESPIRATORY - ADULT  Goal: Achieves optimal ventilation and oxygenation  Description: INTERVENTIONS:  - Assess for changes in respiratory status  - Assess for changes in mentation and behavior  - Position to facilitate oxygenation and minimize respiratory effort  - Oxygen administered by appropriate delivery if ordered  - Initiate smoking cessation education as indicated  - Encourage broncho-pulmonary hygiene including cough, deep breathe, Incentive Spirometry  - Assess the need for suctioning and aspirate as needed  - Assess and instruct to report SOB or any respiratory difficulty  - Respiratory Therapy support as indicated  Outcome: Progressing

## 2022-12-13 NOTE — ASSESSMENT & PLAN NOTE
Likely acute toxic metabolic encephalopathy due to underlying infection, rule out CVA  Patient reports sleepy and feeling weak, oriented to place and person day of week on exam   · CT head showed - Hypodensity in the right parieto-occipital region most likely represents a chronic infarct    Also differential could be metastatic disease based on the CT of the chest  · No focal neuro deficit on exam   Mental status has improved significantly with treatment of sepsis  · ABG no hypercapnia  · Patient was initially worked up for possible stroke  · Neurology input appreciated  · Continue Xarelto 15 mg p o  daily and Zocor 20 mg p o  daily  · Patient could not get MRI of the brain due to incompatible pacemaker  · Was given full dose aspirin in the ED along with Lipitor 80 mg p o  daily and was continued on Lipitor 40 mg p o  daily  · Hemoglobin A1c was 5 1  · Repeat CT head 12/12 which shows persistent small patchy hypodensities concerning for vasogenic edema related to metastatic foci  · PT/OT evaluation and treatment

## 2022-12-13 NOTE — QUICK NOTE
Lab review:    Sodium 130mEq/L corrected by glucose,  CXR with multiple pulmonary nodules with pleural effusion, and LE edema, Cymbalta  Likely secondary to SIADH with component of fluid overload     Plan:   · Serum, urina osm  · fluid restriction 1 2L  · Restart Torsemide at 20mg daily and will titrate up as needed   · Formal consult to follow       Angelina Nagel MD  Nephrology Attending

## 2022-12-13 NOTE — ASSESSMENT & PLAN NOTE
Patient presented with somnolence for 24 hours and generalized weakness  Reported a little cough and runny nose, denies sore throat/SOB/fever/chills  · Patient hospitalized in November 2022 for symptomatic anemia, hemoglobin 7 1 on admission with positive stool guaiac, received 1 unit of PRBC, underwent EGD and colonoscopy both showed no evidence of bleeding, patient was recommended outpatient capsule endoscopy which is still pending   Plavix discontinued on discharge, patient remained on Xarelto  · WBC 15 38, repeat 14 80, no bands, patient afebrile initially  · Lactic acid normal   · Procalcitonin elevated  · Chest x-ray showed no evidence of pneumonia  · UA no evidence of infection   · COVID-19, flu RSV negative  · CT chest showed -  Numerous new pulmonary nodules, concerning for metastatic disease; Mediastinal and hilar lymphadenopathy, also concerning for metastases; Small right pleural effusion with adjacent basal opacity, most likely represents atelectasis  · Patient was given Zosyn in the ED and was later de-escalated to Rocephin  Currently on Ancef 2 g every 8 hours  · Blood cultures 2 out of 2 growing Methicillin sensitive Staphylococcus aureus  · ID input appreciated  · Echocardiogram showed EF of 60% with severe concentric hypertrophy, EF of 60% without any visible vegetations with severe attic stenosis  · Patient underwent MARY which showed EF of 55% without any large vegetation but cannot rule out small vegetation due to calcium and acoustic shadowing  · Unclear source-possible from skin translocation as patient noted to have multiple scabs  · CT scan showing multiple lung nodules-questionable septic emboli  · Pacemaker removal was recommended by ID  per cardiology, pacemaker has been in place for 12 years and removal would be an extensive surgical procedure    Given multiple comorbidities cardiology recommending conservative approach and treatment with IV antibiotics with repeat blood cultures  · Repeat blood cultures from 12/7/2022 with no growth   · Anticipate course of 6 weeks of antibiotics from the date of blood culture clearance      · Status post PICC line placement

## 2022-12-13 NOTE — ASSESSMENT & PLAN NOTE
Wt Readings from Last 3 Encounters:   12/07/22 120 kg (265 lb)   11/22/22 121 kg (265 lb 10 5 oz)   10/20/22 120 kg (265 lb)     On Demadex 40 mg p o  Daily at home  · 2D echo in January 2022 showed normal EF, 65%, moderate concentric hypertrophy, dilated atriums, severe AS, mild to moderate AR, moderate MS  · Chest X-ray showed cardiomegaly with moderate congestive changes  · CT chest - numerous new pulmonary nodules with mediastinal and hilar lymphadenopathy small right pleural effusion  · 1-2+ lower leg edema on exam, albumin 2 6  BNP was elevated at 14,000  · Demadex was held initially in the setting of sepsis which was later restarted  Also received dose of Lasix on 12/8 and 12/9  · Demadex was then held held in the setting of worsening BUN/creatinine    Currently restarted on 20 mg of Demadex daily

## 2022-12-13 NOTE — CASE MANAGEMENT
Case Management Discharge Planning Note    Patient name Lisa Jenkins  Location [address] [place] [address] Raleigh Coates MRN [id number]  : 1940 Date 2022       Current Admission Date: 2022  Current Admission Diagnosis:MSSA bacteremia   Patient Active Problem List    Diagnosis Date Noted   • Anemia 2022   • Depression with anxiety 2022   • MSSA bacteremia 2022   • Acute encephalopathy 2022   • Abnormal CAT scan 2022   • Elevated troponin 2022   • Left hip pain 2022   • Cervical strain 2022   • Lump of skin of left lower extremity 2022   • Primary osteoarthritis of both knees 2022   • Depression 2022   • Generalized weakness 2022   • Valvular heart disease 2022   • Pressure injury of right buttock, unstageable ([facility] ) 2022   • Ambulatory dysfunction 2022   • Dysphagia, pharyngoesophageal phase 2022   • Gastro-esophageal reflux disease without esophagitis 2022   • Generalized muscle weakness 2022   • History of falling 2022   • Iron deficiency anemia 2022   • Major depressive disorder, recurrent, unspecified ([facility] ) 2022   • Moderate to severe aortic stenosis 2022   • Other abnormalities of gait and mobility 2022   • Other specified polyneuropathies 2022   • Pulmonary hypertension due to left heart disease ([facility] ) 2022   • Presence of coronary angioplasty implant and graft 2022   • Unspecified hearing loss, unspecified ear 2022   • Chronic pain disorder 2022   • Fall 2022   • Closed fracture of right femur ([facility] ) 2022   • COPD (chronic obstructive pulmonary disease) ([facility] ) 2021   • Chronic diastolic heart failure ([facility] ) 2021   • Memory difficulty 2021   • Neuropathy 2021   • Coronary artery arteriosclerosis 2021   • Essential hypertension 2021   • Dyspnea on exertion    • Idiopathic hematuria 2021   • Kidney stone on left side 04/01/2021   • Flank pain 04/01/2021   • Obesity (BMI 30-39 9) 04/01/2021   • Chronic constipation 02/16/2021   • Vitamin D insufficiency 11/13/2020   • Symptomatic anemia 09/13/2020   • Former smoker 01/13/2020   • Nephrolithiasis 10/30/2019   • Bilateral leg edema 09/26/2019   • Hyperuricemia 07/22/2019   • Mixed simple and mucopurulent chronic bronchitis ([facility] ) 07/17/2019   • CAD (coronary artery disease) 05/13/2019   • Status post primary angioplasty with coronary stent 05/13/2019   • Stage 3a chronic kidney disease ([facility] ) 04/18/2019   • Hyponatremia 04/08/2019   • Serum total bilirubin elevated 04/08/2019   • Peripheral arteriosclerosis ([facility] ) 01/03/2019   • Pain in both lower extremities 01/31/2018   • Benign hypertension with chronic kidney disease, stage III ([facility]  ) 06/27/2017   • JOO (generalized anxiety disorder) 06/07/2016   • Chronic pain syndrome 03/17/2016   • Bilateral lumbar radiculopathy 03/17/2016   • Lumbar canal stenosis 03/17/2016   • Difficulty in walking 09/11/2015   • Persistent proteinuria 01/08/2015   • Urinary incontinence 10/30/2014   • Aneurysm of abdominal aorta 04/07/2014   • Centrilobular emphysema ([facility] ) 04/07/2014   • Deep venous insufficiency 04/07/2014   • Hyperlipidemia 04/07/2014   • Pacemaker 04/07/2014   • Fibromyalgia 08/08/2013   • 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/2013   • Fecal soiling 03/26/2013   • Class 2 obesity due to excess calories without serious comorbidity with body mass index (BMI) of 36 0 to 36 9 in adult 03/26/2013   • Chronic a-fib ([facility] ) 01/23/2013   • Allergic rhinitis 10/01/2012   • Benign prostatic hyperplasia 09/04/2012   • Carpal tunnel syndrome 09/04/2012   • Moderate or severe vision impairment, one eye 09/04/2012   • Smoker 09/04/2012   • JOVI (obstructive sleep apnea) 09/04/2012   • Venous insufficiency (chronic) (peripheral) 09/04/2012      LOS (days): 7  Geometric Mean LOS (GMLOS) (days): 4 60  Days to GMLOS:-2 4 OBJECTIVE:  Risk of Unplanned Readmission Score: 31 31      Current admission status: Inpatient   Preferred Pharmacy:   [facility] [address], [phone] [facility] [address], [address] [address]  Phone: [phone] Fax: [phone]    [facility] [address] [facility], [address] [place] [address] [address] [facility]  Phone: [phone] Fax: [facility], [address]  [address] [facility] [address] [facility]  Phone: [phone] Fax: [phone]    [facility] [address] [address]  Phone: [phone] Fax: [phone]    Primary Care Provider: Fox Savage DO    Primary Insurance: MEDICARE  Secondary Insurance: COMMERCIAL MISCELLANEOUS    DISCHARGE DETAILS:    Discharge planning discussed with[de-identified] Patient, Dtr Kelsi Sparks of Choice: Yes  Comments - Freedom of Choice: SW met bedside with patient and dtr to continue discussions on discharge planning  Attending just left room advising patient/dtr of possible d/c tomorrow to [facility] pending improvement with sodium levels  Both gave verbal understanding and have no concerns regarding  CM contacted family/caregiver?: Yes  Were Treatment Team discharge recommendations reviewed with patient/caregiver?: Yes  Did patient/caregiver verbalize understanding of patient care needs?: N/A- going to facility  Were patient/caregiver advised of the risks associated with not following Treatment Team discharge recommendations?: Yes    Contacts  Patient Contacts: [place]-dtr  Relationship to Patient[de-identified] Family  Contact Method:  In Person  Reason/Outcome: Continuity of Care, Emergency Contact, Discharge Planning    Other Referral/Resources/Interventions Provided:  Interventions: Transportation, Short Term Rehab  Referral Comments: [place] updated via [employer] of anticipated discharge tomorrow and reserved pickup time  Treatment Team Recommendation: Short Term Rehab  Discharge Destination Plan[de-identified] Short Term Rehab  Transport at Discharge : [facility] Ambulance     Number/Name of Dispatcher: [facility]  Transported by [employer] and Unit #): [place]  ETA of Transport (Date): 12/14/22  ETA of Transport (Time): 1300     IMM Given (Date):: 12/13/22  IMM Given to[de-identified] Patient (IMM#2 reviewed with patient and dtr  Both gave verbal understanding  Original provided   Copy placed in scan bin )

## 2022-12-13 NOTE — ASSESSMENT & PLAN NOTE
Mild troponin elevation most likely non MI troponin elevation secondary to sepsis  · EKG showed paced rhythm  · Patient also complained of some chest pain on 12/8/22 and repeat troponin remains around 700  · Repeat EKG showed some inferolateral T wave inversion per prior provider  · Suspected secondary to symptomatic anemia    Symptoms improved after 1 unit of PRBC transfusion

## 2022-12-13 NOTE — ASSESSMENT & PLAN NOTE
Status post PCI with stents x 2  On Imdur, statin at home    · Continue statin, Imdur and Xarelto  · Patient was recommended to stop Plavix during prior admission

## 2022-12-13 NOTE — ASSESSMENT & PLAN NOTE
On Trelegy, albuterol nebulizer p r n   At home  · Was initially on 2 L oxygen but currently stable on room air  · Substituted Trelegy with [facility] and Incruse  · Continue albuterol as needed

## 2022-12-13 NOTE — NURSING NOTE
Pt refusing to wear yoan  Educated about the purpose and importance of wearing it  Pt refused and and stated "If you put that back on I will tear it again"   Provider notified

## 2022-12-13 NOTE — PROGRESS NOTES
Progress Note - Infectious Disease   Miladys Hensley 80 y o  male MRN: [id number]  Unit/Bed#: [address]  Encounter: [id number]      Impression/Plan:    1  MSSA bacteremia  POA, 2/2 admission blood cultures growing MSSA  Unclear source, may be skin translocation as the patient has multiple scabs on his arms and legs  This is complicated by the presence of a PPM  TTE with AV and MV thickening, no clear vegetations  MARY with no obvious large vegetations, but given significant calcification cannot rule out small lesion  CT chest with numerous pulmonary nodules, could be septic emboli vs less likely metastatic disease  This does raise concern for a primary endovascular source of infection  Discussed treatment options with the family and Cardiology  PPM removal is recommended in the setting of staph bacteremia regardless of the primary source, however the pacemaker has been in place 12 years and removal would be an extensive surgical procedure  Given his comorbidities, there are risks to surgery  His family would like to pursue a more conservative approach with antibiotics before any surgery  Repeat blood cultures no growth  -continue IV Cefazolin 2g q8hr   -recommend a 6 week course of antibiotics from blood culture clearance through 1/17/23 (script provided to CM)   -Weekly CBC with differential and BMP on IV antibiotics   -will need surveillance blood cultures 2 weeks after completion of antibiotic course   -if there is recurrence the patient will require pacemaker extraction   -will arrange ID follow-up 2 weeks after discharge     2  Weakness, encephalopathy  Likely toxic metabolic due to infection as above  CTH showed a hypodensity in the right parieto-occipital region most likely a chronic infarct or metastatic lesion  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   Encephalopathy has improved  The patient appears depressed with flat affect    Noted weakness holding his head up today, but no neck pain   CT of the cervical spine shows no abnormalities  -treatment of infection as above              -neurology follow up              -monitor mental status   -PT/OT     3  Leukocytosis  Likely due to #1 above  Patient is afebrile, hemodynamically stable  WBC count stable              -antibiotics as above              -monitor WBC count, fever curve     4  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  Complicated by MSSA bacteremia  MARY no clear vegetations    -management as above     5  Pulmonary nodules and adenopathy  CT chest shows numerous new pulmonary nodules and mediastinal and hilar lymphadenopathy, concerning for metastases however in setting of staph aureus bacteremia could be septic emboli  Patient had a colonoscopy last month without abnormalities  He is a long term smoker  -pulmonary consulted   -will need serial imaging after 4-6 weeks of antibiotics; if persistent will require work up for malignancy     6  CKD  Creatinine stable at baseline  Antibiotics dose adjusted as needed      I have discussed the above management plan in detail with Dr Nava Garcia and the patient  ID will follow  Antibiotics:  Cefazolin day 5    Subjective: The patient reports pain in his neck, but has been sitting in the chair with his head and neck slumped over  He denies fever, chills, chest pain, shortness of breath  Had an episode of nausea and vomiting earlier  Objective:  Vitals:  Temp:  [96 4 °F (35 8 °C)-97 9 °F (36 6 °C)] 97 2 °F (36 2 °C)  HR:  [67-78] 73  Resp:  [18-49] 24  BP: (107-133)/(59-73) 112/64  SpO2:  [95 %-100 %] 98 %  Temp (24hrs), Av 1 °F (36 2 °C), Min:96 4 °F (35 8 °C), Max:97 9 °F (36 6 °C)  Current: Temperature: (!) 97 2 °F (36 2 °C)    Physical Exam:   General Appearance:  He is non toxic appearing but chronically ill  Flat affect   Throat: Oropharynx moist without lesions      Lungs:   Clear to auscultation bilaterally; no wheezes, rhonchi or rales; respirations unlabored   Heart:  RRR; no murmur, rub or gallop   Abdomen:   Soft, non-tender, non-distended, positive bowel sounds  Extremities: No clubbing, cyanosis or edema   Skin: Multiple ecchymoses, scabs on his arms and left knee       Labs: All pertinent labs and imaging studies were personally reviewed  Results from last 7 days   Lab Units 12/13/22  0519 12/12/22  0442 12/11/22  0643   WBC Thousand/uL 11 59* 12 77* 14 68*   HEMOGLOBIN g/dL 8 3* 8 3* 8 3*   PLATELETS Thousands/uL 249 227 194     Results from last 7 days   Lab Units 12/13/22  0519 12/12/22  0442 12/11/22  0643   SODIUM mmol/L 128* 130* 134*   POTASSIUM mmol/L 4 4 4 3 4 4   CHLORIDE mmol/L 92* 93* 97   CO2 mmol/L 26 27 27   BUN mg/dL 48* 43* 41*   CREATININE mg/dL 1 86* 1 81* 1 74*   EGFR ml/min/1 73sq m 32 34 35   CALCIUM mg/dL 8 8 8 8 8 5     Results from last 7 days   Lab Units 12/07/22  0516   PROCALCITONIN ng/ml 1 80*                   Micro:  Results from last 7 days   Lab Units 12/07/22  0549 12/06/22  2209   BLOOD CULTURE  No Growth After 5 Days  No Growth After 5 Days  --    LEGIONELLA URINARY ANTIGEN   --  Negative       Imaging:  I have personally reviewed pertinent imaging study reports and images in PACS      CT chest: numerous new pulmonary nodules and mediastinal and hilar lymphadenopathy, concerning for metastases     Other Studies:   I have personally reviewed pertinent reports

## 2022-12-13 NOTE — ASSESSMENT & PLAN NOTE
Results from last 7 days   Lab Units 12/13/22  1559 12/13/22  0519 12/12/22  0442 12/11/22  0643 12/10/22  0611 12/09/22  0515 12/08/22  0614 12/07/22  0516   SODIUM mmol/L 127* 128* 130* 134* 131* 133* 131* 130*     In the setting of volume overload  · Demadex was held in the setting of worsening creatinine but restarted again today  · Sodium level slowly trending down    Nephrology consulted  · Placed on 1200 mL fluid restriction

## 2022-12-13 NOTE — ASSESSMENT & PLAN NOTE
Severe AS on echo in 1/2022  · Underwent MARY which showed mild to moderate concentric hypertrophy with EF of 55% with moderately thickened aortic valve with severe aortic valve stenosis

## 2022-12-14 LAB
ANION GAP SERPL CALCULATED.3IONS-SCNC: 10 MMOL/L (ref 4–13)
BUN SERPL-MCNC: 50 MG/DL (ref 5–25)
CALCIUM SERPL-MCNC: 8.6 MG/DL (ref 8.3–10.1)
CHLORIDE SERPL-SCNC: 91 MMOL/L (ref 96–108)
CO2 SERPL-SCNC: 28 MMOL/L (ref 21–32)
CREAT SERPL-MCNC: 1.88 MG/DL (ref 0.6–1.3)
ERYTHROCYTE [DISTWIDTH] IN BLOOD BY AUTOMATED COUNT: 20.4 % (ref 11.6–15.1)
GFR SERPL CREATININE-BSD FRML MDRD: 32 ML/MIN/1.73SQ M
GLUCOSE SERPL-MCNC: 115 MG/DL (ref 65–140)
HCT VFR BLD AUTO: 25.4 % (ref 36.5–49.3)
HGB BLD-MCNC: 7.7 G/DL (ref 12–17)
MCH RBC QN AUTO: 26.6 PG (ref 26.8–34.3)
MCHC RBC AUTO-ENTMCNC: 30.3 G/DL (ref 31.4–37.4)
MCV RBC AUTO: 88 FL (ref 82–98)
OSMOLALITY UR: 326 MMOL/KG
PLATELET # BLD AUTO: 232 THOUSANDS/UL (ref 149–390)
PMV BLD AUTO: 10 FL (ref 8.9–12.7)
POTASSIUM SERPL-SCNC: 4.2 MMOL/L (ref 3.5–5.3)
RBC # BLD AUTO: 2.9 MILLION/UL (ref 3.88–5.62)
SODIUM 24H UR-SCNC: 7 MOL/L
SODIUM SERPL-SCNC: 129 MMOL/L (ref 135–147)
WBC # BLD AUTO: 8.98 THOUSAND/UL (ref 4.31–10.16)

## 2022-12-14 RX ADMIN — CEFAZOLIN SODIUM 2000 MG: 2 SOLUTION INTRAVENOUS at 21:08

## 2022-12-14 RX ADMIN — DULOXETINE HYDROCHLORIDE 60 MG: 60 CAPSULE, DELAYED RELEASE ORAL at 18:01

## 2022-12-14 RX ADMIN — DOCUSATE SODIUM 100 MG: 100 CAPSULE, LIQUID FILLED ORAL at 08:58

## 2022-12-14 RX ADMIN — Medication 250 MG: at 18:01

## 2022-12-14 RX ADMIN — FINASTERIDE 5 MG: 5 TABLET, FILM COATED ORAL at 08:59

## 2022-12-14 RX ADMIN — SENNOSIDES 8.6 MG: 8.6 TABLET, FILM COATED ORAL at 21:08

## 2022-12-14 RX ADMIN — ISOSORBIDE MONONITRATE 30 MG: 30 TABLET, EXTENDED RELEASE ORAL at 08:58

## 2022-12-14 RX ADMIN — FLUTICASONE FUROATE AND VILANTEROL TRIFENATATE 1 PUFF: 200; 25 POWDER RESPIRATORY (INHALATION) at 08:58

## 2022-12-14 RX ADMIN — ATORVASTATIN CALCIUM 40 MG: 40 TABLET, FILM COATED ORAL at 16:47

## 2022-12-14 RX ADMIN — CEFAZOLIN SODIUM 2000 MG: 2 SOLUTION INTRAVENOUS at 14:21

## 2022-12-14 RX ADMIN — DOCUSATE SODIUM 100 MG: 100 CAPSULE, LIQUID FILLED ORAL at 21:08

## 2022-12-14 RX ADMIN — METHOCARBAMOL 500 MG: 500 TABLET ORAL at 21:08

## 2022-12-14 RX ADMIN — GABAPENTIN 100 MG: 100 CAPSULE ORAL at 21:08

## 2022-12-14 RX ADMIN — DULOXETINE HYDROCHLORIDE 60 MG: 60 CAPSULE, DELAYED RELEASE ORAL at 08:58

## 2022-12-14 RX ADMIN — POLYETHYLENE GLYCOL 3350 17 G: 17 POWDER, FOR SOLUTION ORAL at 08:57

## 2022-12-14 RX ADMIN — Medication 250 MG: at 08:58

## 2022-12-14 RX ADMIN — PANTOPRAZOLE SODIUM 40 MG: 40 TABLET, DELAYED RELEASE ORAL at 05:00

## 2022-12-14 RX ADMIN — RIVAROXABAN 15 MG: 15 TABLET, FILM COATED ORAL at 08:58

## 2022-12-14 RX ADMIN — ALLOPURINOL 200 MG: 100 TABLET ORAL at 08:58

## 2022-12-14 RX ADMIN — CEFAZOLIN SODIUM 2000 MG: 2 SOLUTION INTRAVENOUS at 05:00

## 2022-12-14 RX ADMIN — TORSEMIDE 20 MG: 20 TABLET ORAL at 08:58

## 2022-12-14 RX ADMIN — UMECLIDINIUM 1 PUFF: 62.5 AEROSOL, POWDER ORAL at 08:58

## 2022-12-14 NOTE — PHYSICAL THERAPY NOTE
PT TREATMENT     12/14/22 1520   PT Last Visit   PT Visit Date 12/14/22   Note Type   Note Type Treatment   Pain Assessment   Pain Assessment Tool Carlos-Baker FACES   Carlos-Baker FACES Pain Rating 0   Restrictions/Precautions   Weight Bearing Precautions Per Order No   Other Precautions Chair Alarm; Bed Alarm; Fall Risk  (generalized weakness)   General   Chart Reviewed Yes   Family/Caregiver Present No   Cognition   Overall Cognitive Status WFL   Arousal/Participation Cooperative   Attention Attends with cues to redirect   Following Commands Follows one step commands with increased time or repetition   Subjective   Subjective "just wait a minute"  several times during session, pt needed time to rest during transitions   Bed Mobility   Supine to Sit 3  Moderate assistance   Additional items Assist x 1; Increased time required;Verbal cues;LE management   Sit to Supine 3  Moderate assistance   Additional items Assist x 2;Verbal cues;LE management   Transfers   Sit to Stand 3  Moderate assistance   Additional items Assist x 2;Verbal cues   Stand to Sit 3  Moderate assistance   Additional items Verbal cues   Ambulation/Elevation   Gait pattern Foward flexed; Short stride   Gait Assistance 3  Moderate assist   Additional items Verbal cues; Assist x 2   Assistive Device Rolling walker   Distance 3 feet side stepping along bed   Ambulation/Elevation Additional Comments Pt is moving very slow today, frequent verbal and tactile cues ; cue to focus on task at hand  Activity Tolerance   Activity Tolerance Patient limited by fatigue   Nurse Made Aware yes: Hulen Garcia   Exercises   Heelslides Supine;10 reps;AAROM; Bilateral   Hip Abduction Supine;10 reps;AAROM; Bilateral   Knee AROM Short Arc Quad Supine;10 reps;Bilateral   Ankle Pumps Supine;10 reps;Bilateral   Assessment   Prognosis Good   Problem List Decreased strength;Decreased endurance; Impaired balance;Decreased mobility; Decreased coordination;Decreased safety awareness;Pain Assessment Pt agreeable to PT, however takes increased time and cues to complete task at hand  Pt requests frequent rest periods, needs cues to lift head  Pt would benefit from continued PT to work toward goals  The patient's AM-PAC Basic Mobility Inpatient Short Form Raw Score is 11  A Raw score of less than or equal to 16 suggests the patient may benefit from discharge to post-acute rehabilitation services  Please also refer to the recommendation of the Physical Therapist for safe discharge planning  Goals   Patient Goals to get stronger, go home   Plan   Treatment/Interventions ADL retraining;Functional transfer training;LE strengthening/ROM; Therapeutic exercise; Endurance training;Patient/family training;Equipment eval/education; Bed mobility;Gait training;Spoke to nursing;Spoke to case management;OT   Progress Slow progress, decreased activity tolerance   PT Frequency Other (Comment)  (5/wk)   Recommendation   PT Discharge Recommendation Post acute rehabilitation services   AM-PAC Basic Mobility Inpatient   Turning in Bed Without Bedrails 2   Lying on Back to Sitting on Edge of Flat Bed 2   Moving Bed to Chair 2   Standing Up From Chair 2   Walk in Room 2   Climb 3-5 Stairs 1   Basic Mobility Inpatient Raw Score 11   Basic Mobility Standardized Score 30 25   Highest Level Of Mobility   JH-HLM Goal 4: Move to chair/commode   JH-HLM Achieved 5: Stand (1 or more minutes)   End of Consult   Patient Position at End of Consult Supine;Bed/Chair alarm activated; All needs within reach   Licensure   [facility]  Talha Gooden  Stuart Minors PT  [id number]

## 2022-12-14 NOTE — PLAN OF CARE
Problem: Potential for Falls  Goal: Patient will remain free of falls  Description: INTERVENTIONS:  - Educate patient/family on patient safety including physical limitations  - Instruct patient to call for assistance with activity   - Consult OT/PT to assist with strengthening/mobility   - Keep Call bell within reach  - Keep bed low and locked with side rails adjusted as appropriate  - Keep care items and personal belongings within reach  - Initiate and maintain comfort rounds  - Make Fall Risk Sign visible to staff  - Offer Toileting every 2 Hours, in advance of need  - Initiate/Maintain alarm  - Obtain necessary fall risk management equipment  - Apply yellow socks and bracelet for high fall risk patients  - Consider moving patient to room near nurses station  Outcome: Progressing     Problem: RESPIRATORY - ADULT  Goal: Achieves optimal ventilation and oxygenation  Description: INTERVENTIONS:  - Assess for changes in respiratory status  - Assess for changes in mentation and behavior  - Position to facilitate oxygenation and minimize respiratory effort  - Oxygen administered by appropriate delivery if ordered  - Initiate smoking cessation education as indicated  - Encourage broncho-pulmonary hygiene including cough, deep breathe, Incentive Spirometry  - Assess the need for suctioning and aspirate as needed  - Assess and instruct to report SOB or any respiratory difficulty  - Respiratory Therapy support as indicated  Outcome: Progressing     Problem: Prexisting or High Potential for Compromised Skin Integrity  Goal: Skin integrity is maintained or improved  Description: INTERVENTIONS:  - Identify patients at risk for skin breakdown  - Assess and monitor skin integrity  - Assess and monitor nutrition and hydration status  - Monitor labs   - Assess for incontinence   - Turn and reposition patient  - Assist with mobility/ambulation  - Relieve pressure over bony prominences  - Avoid friction and shearing  - Provide appropriate hygiene as needed including keeping skin clean and dry  - Evaluate need for skin moisturizer/barrier cream  - Collaborate with interdisciplinary team   - Patient/family teaching  - Consider wound care consult   Outcome: Progressing

## 2022-12-14 NOTE — CONSULTS
Consultation - Nephrology   Mireya Corbett 80 y o  male MRN: [id number]  Unit/Bed#: 2 [place] [address] Encounter: [id number]    ASSESSMENT AND PLAN:  80 y o  man with PMH of COPD, CAD status post PCI, CHF, BPH, GERD, arctic stenosis, A  fib, p/w lethargy and weakness  Chanelle is consulted for management of hyponatremia      PLAN    #Hyposomolar Hyponatremia   · Sodium on admission: 133 mEq/L  • Serum osm: Ordered   • Urine osm: Ordered   • Urine Sodium: Ordered   • Current sodium: 129 mEq/L  • Etiology: Multifactorial likely secondary to SIADH in the settings of pulmonary nodules, possible malignancy, Cymbalta and component of fluid overload   • Rate of correction: 133>130>130>127>129  • Plan:  • Torsemide restarted yesterday   • Restriction 1 2 L   • BMP Q 24   • Avoid overcorrection: no more than 6-8mEq in the next 24hr, goal </=135  • Please monitor UOP  • Will monitor labs, call renal if SNa+ <126 or >135  • Risk of ODS:  ? Advance age    #Volume status/blood pressure:  • Volume: Slightly hypervolemic  • Blood pressure: Normotensive /58mmHg  • Recommend:  • Torsemide 20 mg as above    #CKD G3b  • Baseline creatinine: 1 5 to 1 8 mg/dL  • Current creatinine: 1 8 mg/dL, at baseline  • Etiology: Secondary to nephrosclerosis in the settings of vascular disease  • UA: No hematuria, no leukocyturia  • Plan:  • No indication of urgent dialysis at this time  • Maintain mean arterial pressure above 65 mmHg to avoid hypotension/hypoperfusion  • Avoid nephrotoxic agents such as NSAIDs and contrast if at all possible    • Avoid opioids   • Adjust medications to GFR  • Renal diet     #Acid base disorder  • serum HCO3 20 mmol/L  • Metabolic alkalosis likely secondary to muscular contraction in the settings of diureticc    #CKD-MBD  • Calcium 8 6 mg/dL  • At goal    #Secondary Hyperparathyroidism   • Will check iPTH as an outpatient guidelines levels KDIGO 2017    #Anemia:  · Current hemoglobin: 7 7 mg/dL  · Treatment:  · Transfuse for hemoglobin less than 7 0 per primary service      #AMS  · Possible metastasis  · Neurology following    #Pulmonary nodules  Possible malignancy    #MSSA bacteremia  · On cefazolin  · Follow-up by ID      HISTORY OF PRESENT ILLNESS:  Requesting Physician: Shruthi Sharp DO  Reason for Consult: Hyponatremia     Owen Hendrix is a 80 y o   male with PMH of COPD, CAD status post PCI, CHF, BPH, GERD, arctic stenosis, A  fib, who was admitted to [facility] after presenting with lethargy and weakness  A renal consultation is requested today for assistance in the management of hyponatremia      PAST MEDICAL HISTORY:  Past Medical History:   Diagnosis Date   • Arthritis    • Atrial flutter ([facility] [address] )    • Chronic kidney disease     stage 3   • Coronary artery disease     2 stents   • Fluid retention    • Gout    • Heart disease    • Heart failure (HCC)     pacemaker   • Hypertension    • Hyponatremia 04/08/2019   • Neurological disorder    • Pacemaker    • Pulmonary emphysema ([facility] [address] )    • Radiculopathy     last assessed 1/28/16    • Shortness of breath     exertion   • Sleep apnea     c pap       PAST SURGICAL HISTORY:  Past Surgical History:   Procedure Laterality Date   • ANGIOPLASTY      x2 2 stents and then replaced   • CARDIAC PACEMAKER PLACEMENT      pacemaker permanent placement dual chamber / last assessed 4/7/14 / implantation    • CARDIAC SURGERY      pacemaker   • CHOLECYSTECTOMY     • CORONARY ANGIOPLASTY WITH STENT PLACEMENT     • EPIDURAL BLOCK INJECTION N/A 05/26/2016    Procedure: BLOCK / INJECTION EPIDURAL STEROID LUMBAR  L4-5;  Surgeon: Kael Menendez MD;  Location: [facility] MAIN OR;  Service:    • EPIDURAL BLOCK INJECTION N/A 02/14/2019    Procedure: L4 L5 Lumbar Epidural Steroid Injection;  Surgeon: Kael Menendez MD;  Location: [facility] MAIN OR;  Service: Pain Management    • EYE SURGERY      cataract left   • HAND SURGERY     • HIP PINNING Left    • Claudeen Harrison / Ellen Graham / REMOVE PACEMAKER     • IR TUNNELED CENTRAL LINE PLACEMENT  2022   • KNEE ARTHROSCOPY W/ MENISCAL REPAIR Left    • KNEE SURGERY     • LUMBAR EPIDURAL INJECTION N/A 2016    Procedure: BLOCK / INJECTION LUMBAR  L4-5  (C-ARM); Surgeon: Faisal Arredondo MD;  Location: [facility] MAIN OR;  Service:    • NE OPEN RX FEMUR FX+INTRAMED ELLA Right 2022    Procedure: INSERTION NAIL IM FEMUR ANTEGRADE (TROCHANTERIC);   Surgeon: Kelechi Pierre MD;  Location: [facility] Main OR;  Service: Orthopedics       ALLERGIES:  No Known Allergies    SOCIAL HISTORY:  Social History     Substance and Sexual Activity   Alcohol Use Never     Social History     Substance and Sexual Activity   Drug Use No     Social History     Tobacco Use   Smoking Status Every Day   • Packs/day: 0 25   • Years: 50 00   • Pack years: 12 50   • Types: Cigarettes   • Start date: 2022   Smokeless Tobacco Former   Tobacco Comments    quit 2021       FAMILY HISTORY:  Family History   Problem Relation Age of Onset   • Cancer Mother 80        Cancer when 80 yrs old   • Heart disease Mother    • Hypertension Mother    • Heart disease Father    • Cancer Father         Heart   46   • Diabetes Neg Hx    • Stroke Neg Hx        MEDICATIONS:    Current Facility-Administered Medications:   •  acetaminophen (TYLENOL) tablet 650 mg, 650 mg, Oral, Q6H PRN, Cuiyin Yurik, CRNP, 650 mg at 22 1531  •  albuterol inhalation solution 2 5 mg, 2 5 mg, Nebulization, Q6H PRN, Cuiyin Yurik, CRNP, 2 5 mg at 22 1715  •  allopurinol (ZYLOPRIM) tablet 200 mg, 200 mg, Oral, Daily, Cuiyin Yurik, CRNP, 200 mg at 22 0930  •  atorvastatin (LIPITOR) tablet 40 mg, 40 mg, Oral, Daily With Dinner, Cuiyin Yurik, CRNP, 40 mg at 22 1803  •  ceFAZolin (ANCEF) IVPB (premix in dextrose) 2,000 mg 50 mL, 2,000 mg, Intravenous, Q8H, Ron Ellis MD, Last Rate: 100 mL/hr at 22 0500, 2,000 mg at 22 0500  •  docusate sodium (COLACE) capsule 100 mg, 100 mg, Oral, BID, Aiyana Becerra MD, 100 mg at 12/13/22 2243  •  DULoxetine (CYMBALTA) delayed release capsule 60 mg, 60 mg, Oral, BID, NAVA Coleman, 60 mg at 12/13/22 1806  •  finasteride (PROSCAR) tablet 5 mg, 5 mg, Oral, Daily, NAVA Coleman, 5 mg at 12/13/22 0930  •  fluticasone-vilanterol 200-25 mcg/actuation 1 puff, 1 puff, Inhalation, Daily, NAVA Coleman, 1 puff at 12/13/22 0931  •  gabapentin (NEURONTIN) capsule 100 mg, 100 mg, Oral, HS, NAVA Coleman, 100 mg at 12/13/22 2242  •  isosorbide mononitrate (IMDUR) 24 hr tablet 30 mg, 30 mg, Oral, Daily, NAVA Coleman, 30 mg at 12/13/22 0931  •  lidocaine (LIDODERM) 5 % patch 1 patch, 1 patch, Topical, Daily, Shelby Mueller PA-C, 1 patch at 12/13/22 0930  •  methocarbamol (ROBAXIN) tablet 500 mg, 500 mg, Oral, Q6H PRN, Lynn Montano DO  •  ondansetron (ZOFRAN) injection 4 mg, 4 mg, Intravenous, Q6H PRN, NAVA Coleman, 4 mg at 12/13/22 1150  •  pantoprazole (PROTONIX) EC tablet 40 mg, 40 mg, Oral, Early Morning, NAVA Coleman, 40 mg at 12/14/22 0500  •  polyethylene glycol (MIRALAX) packet 17 g, 17 g, Oral, Daily PRN, NAVA Coleman  •  polyethylene glycol (MIRALAX) packet 17 g, 17 g, Oral, Daily, Aiyana Becerra MD, 17 g at 12/12/22 0844  •  rivaroxaban (XARELTO) tablet 15 mg, 15 mg, Oral, Daily With Breakfast, NAVA Coleman, 15 mg at 12/13/22 0930  •  saccharomyces boulardii (FLORASTOR) capsule 250 mg, 250 mg, Oral, BID, NAVA Coleman, 250 mg at 12/13/22 1803  •  senna (SENOKOT) tablet 8 6 mg, 1 tablet, Oral, HS, Aiyana Becerra MD, 8 6 mg at 12/13/22 2242  •  torsemide (DEMADEX) tablet 20 mg, 20 mg, Oral, Daily, Sushil Bae MD, 20 mg at 12/13/22 1323  •  umeclidinium 62 5 mcg/actuation inhaler AEPB 1 puff, 1 puff, Inhalation, Daily, NAVA Coleman, 1 puff at 12/13/22 0931    REVIEW OF SYSTEMS:  Complete 10 point review of systems were obtained and discussed in length with the patient  Complete review of systems were negative / unremarkable except mentioned in the HPI section       Review of Systems - Ophthalmic ROS: negative  ENT ROS: negative  Hematological and Lymphatic ROS: negative  Endocrine ROS: negative  Respiratory ROS: no cough, shortness of breath, or wheezing  Cardiovascular ROS: no chest pain or dyspnea on exertion  Gastrointestinal ROS: no abdominal pain, change in bowel habits, or black or bloody stools  Genito-Urinary ROS: no dysuria, trouble voiding, or hematuria  Musculoskeletal ROS: negative  Neurological ROS: no TIA or stroke symptoms  Dermatological ROS: negative     PHYSICAL EXAM:  Current Weight: Weight - Scale: 120 kg (265 lb)  First Weight: Weight - Scale: 120 kg (265 lb)  Vitals:    12/14/22 0150   BP:    Pulse:    Resp:    Temp:    SpO2: 98%       Intake/Output Summary (Last 24 hours) at 12/14/2022 0722  Last data filed at 12/14/2022 0331  Gross per 24 hour   Intake --   Output 400 ml   Net -400 ml     Wt Readings from Last 3 Encounters:   12/07/22 120 kg (265 lb)   11/22/22 121 kg (265 lb 10 5 oz)   10/20/22 120 kg (265 lb)     Temp Readings from Last 3 Encounters:   12/13/22 (!) 97 4 °F (36 3 °C) (Oral)   11/22/22 97 6 °F (36 4 °C)   10/05/22 (!) 97 4 °F (36 3 °C)     BP Readings from Last 3 Encounters:   12/13/22 112/58   11/22/22 119/64   10/20/22 130/70     Pulse Readings from Last 3 Encounters:   12/14/22 71   11/22/22 70   10/20/22 86        General:  Obese, no acute distress at this time  Skin:  No acute rash  Eyes:  No scleral icterus and noninjected  ENT:  mucous membranes moist  Neck:  no carotid bruits  Chest:  Bilateral crackles   CVS:  Regular rate and rhythm without a murmur rub , no jugular venous distention,  Abdomen:  soft and nontender   Extremities:  LE edema   Neuro:  No gross focality  Psych:  Alert , cooperative       Invasive Devices:      Lab Results:   Results from last 7 days   Lab Units 12/14/22  0457 12/13/22  1559 12/13/22  0519 12/12/22  0442 12/11/22  0643 12/10/22  0611 12/09/22  0515 12/08/22  0614   WBC Thousand/uL 8 98  --  11 59* 12 77* 14 68* 13 62* 11 46* 11 58*   HEMOGLOBIN g/dL 7 7*  --  8 3* 8 3* 8 3* 7 9* 7 6* 7 3*   HEMATOCRIT % 25 4*  --  27 1* 27 0* 26 5* 25 4* 24 6* 23 7*   PLATELETS Thousands/uL 232  --  249 227 194 170 129* 128*   POTASSIUM mmol/L 4 2 4 3 4 4 4 3 4 4 4 2 4 0 3 8   CHLORIDE mmol/L 91* 92* 92* 93* 97 95* 96 97   CO2 mmol/L 28 29 26 27 27 28 27 25   BUN mg/dL 50* 54* 48* 43* 41* 38* 37* 36*   CREATININE mg/dL 1 88* 1 97* 1 86* 1 81* 1 74* 1 61* 1 65* 1 61*   CALCIUM mg/dL 8 6 8 7 8 8 8 8 8 5 8 5 8 2* 7 9*       Other Studies:   IR tunneled central line placement   Final Result by Maia Topete MD (12/13 8836)      Insertion of right-sided dual-lumen tunneled small bore central venous catheter, with tip in the expected location of the cavoatrial junction  Plan:       The catheter may be used immediately  Workstation performed: [id number]         CT spine cervical wo contrast   Final Result by Sloane Oneal MD (12/12 1600)   1  The cervical vertebral body heights are maintained demonstrating no acute fracture or subluxation  2  Multilevel cervical degenerative disc disease similar to 11/13/2018  3  New focal nodular lesion within the anterior right lung apex  Please refer to the CT chest examination of 12/6/2022 for further details  Small bilateral pleural effusions, new on the left  Workstation performed: [id number]         CT head wo contrast   Final Result by Sloane Oneal MD (12/12 4760)      1  Persistent small patchy subcortical white matter hypodensities involving the high right frontoparietal, right lateral frontal and right posterior parietal regions concerning for vasogenic edema related to metastatic foci  MRI of the brain with    contrast is recommended for further evaluation  2   Mild chronic microtraumatic ischemic changes        The study was marked in [facility] for significant notification  Workstation performed: [id number]         XR abdomen obstruction series   Final Result by Regina Sutton MD (12/12 1248)      No evidence of small bowel obstruction or free air   Moderate amount of colonic stool         Workstation performed: [id number]         CT head without contrast   Final Result by Charlie Vo MD (12/06 6553)   Addendum (preliminary) 1 of 1 by Charlie Vo MD (12/06 0409)   ADDENDUM:      In light of the concurrent chest CT findings, the right parieto-occipital    abnormality may also represent a metastasis      Final      Hypodensity in the right parieto-occipital region most likely represents a chronic infarct, however no prior study is available to confirm this      The study was marked in EPIC for immediate notification  Workstation performed: [id number]         CT chest without contrast   Final Result by Charlie Vo MD (12/06 0404)      1  Numerous new pulmonary nodules, concerning for metastatic disease   2  Mediastinal and hilar lymphadenopathy, also concerning for metastases   3  Small right pleural effusion               Workstation performed: [id number]         XR chest 1 view portable   Final Result by Wellington Sánchez MD (12/06 1025)      Prominent pulmonary vasculature and interstitial markings suggesting moderate congestive changes, stable  Workstation performed: [id number]             Portions of the record may have been created with voice recognition software  Occasional wrong word or "sound a like" substitutions may have occurred due to the inherent limitations of voice recognition software  Read the chart carefully and recognize, using context, where substitutions have occurred  108

## 2022-12-14 NOTE — CASE MANAGEMENT
Case Management Progress Note    Patient name Milvia Waldrop  Location [facility] 221/[address] [place] MRN [id number]  : 1940 Date 2022       LOS (days): 8  Geometric Mean LOS (GMLOS) (days): 5 00  Days to GMLOS:-2 9        OBJECTIVE:        Current admission status: Inpatient  Preferred Pharmacy:   [id number] Francis Wahl, [phone] [facility] [address] [facility] [address] [facility] [address], [address] [address]  Phone: [phone] Fax: [phone]    [facility], [address] [place] [address]  Phone: [phone] Fax: [place], [address]  [address] [place] [address]  Phone: [phone] Fax: [phone]    [facility] [address] [address]  Phone: [phone] Fax: [phone]    Primary Care Provider: Allan Pantoja DO    Primary Insurance: MEDICARE  Secondary Insurance: COMMERCIAL MISCELLANEOUS    PROGRESS NOTE:      Cm notified during rounds that d/t worsening sodium, patient will be staying today  Prescheduled transport cancelled via [address]  Facility notified via [address] [place] [address]

## 2022-12-14 NOTE — CONSULTS
Milvia Waldrop  1940    HEMATOLOGY/ONCOLOGY CONSULTATION REPORT    12/15/2022 7 AM Discussion/summary was addended  DISCUSSION/SUMMARY:    80-year-old male with multiple medical problems admitted approximately 1 week ago with progressive weakness and somnolence  Issues:    MSSA bacteremia  Patient has been seen/evaluated by ID, on antibiotics  There is concern that the pulmonary nodules are septic emboli (versus metastatic disease - see below)  Patient was deemed not a surgical candidate for PPM removal   Patient will continue the IV antibiotics for 6 weeks  Mental status change, progressive weakness  Etiology not clear, may be multifactorial   Patient cannot undergo MRI because of the incompatible pacemaker  Patient seen/evaluated by neurology - metabolic encephalopathy  CAT scan of the brain demonstrated a right parietal occipital lesion possibly chronic CVA  This lesion was discussed with Dr Beena Meeks, hospitalist at length today  If there is concern that this is metastatic disease or a primary lesion, the scan should be evaluated by neurosurgery  As discussed below, there are no plans for biopsying any other lesions at this time  RT to the brain would not be indicated without more evidence or stronger consensus that this is in fact a malignancy  Pulmonary nodules, mediastinal and hilar adenopathy  Etiology also unclear  Query septic emboli versus metastatic disease  Case discussed with hospitalist   IR consult for biopsy is on hold  The plan is to treat the patient with antibiotics and see if the lesions resolve  If not, biopsy can be reconsidered in the future  Recent scans do not clearly demonstrate a primary lesion  Additionally, I was not able to find any information about patient having lung cancer previously  Normocytic anemia    Etiology for the anemia is also multifactorial - acute illness and infection, anemia of chronic renal insufficiency, phlebotomy possibly other etiologies  Patient seems to be relatively stable from pulmonary standpoint, transfuse if the hemoglobin level approaches 7 0 g/deciliter  Further anemia work-up may be necessary in the future when patient is better/stable and cleared of his infection  Hyposmolar hyponatremia  Patient seen/evaluated by renal   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  Fluid restriction is ongoing  Will follow with you  Please do not hesitate to contact me if you have any questions or need additional information  Thank you for this consult     _________________________________________________________________________________    Chief Complaint   Patient presents with   • Weakness - Generalized     Patient arrives via EMS for weakness at home, no sob, no nausea or vomiting, just weakness, temp upon arrival is 98 4 orally, O2 sat=97% on RA     History of Present Illness: 66-year-old male admitted with progressive somnolence, progressive fatigue and generalized weakness (December 6, 2022)  Past medical history includes anemia, chronic kidney disease, hypertension, atrial fibrillation pacemaker placement, COPD, CHF, CAD, BPH, reflux disease, hyperlipidemia, aortic stenosis, sleep apnea and obesity  Recent work-up demonstrated pulmonary lesions as well as a brain lesion of unclear etiology  Mr J Carlos Stephens was found in bed in minimal respiratory distress  He is pleasant and responsive was not able to supply significant past medical history  Patient also did not seem to know what was going on presently  Patient denies any pain  Activities are nil  Shortness of breath and dyspnea on exertion are about the same as before  No headaches, blurred vision or dizziness  Rest of the information was obtained from the chart  Review of Systems   Constitutional: Positive for activity change, appetite change and fatigue  HENT: Negative  Eyes: Negative  Respiratory: Negative  Cardiovascular: Negative  Gastrointestinal: Negative  Endocrine: Negative  Genitourinary: Negative  Musculoskeletal: Negative  Skin: Negative  Allergic/Immunologic: Negative  Neurological: Negative  Hematological: Negative  Psychiatric/Behavioral: Negative  All other systems reviewed and are negative  Patient Active Problem List   Diagnosis   • Chronic pain syndrome   • Bilateral lumbar radiculopathy   • Lumbar canal stenosis   • Pain in both lower extremities   • JOO (generalized anxiety disorder)   • Benign hypertension with chronic kidney disease, stage III ([facility])   • Persistent proteinuria   • Benign prostatic hyperplasia   • Allergic rhinitis   • Aneurysm of abdominal aorta   • Chronic a-fib ([facility])   • Carpal tunnel syndrome   • Chronic gout without tophus   • Centrilobular emphysema ([facility])   • Deep venous insufficiency   • Difficulty in walking   • Fecal soiling   • Fibromyalgia   • Hyperlipidemia   • Moderate or severe vision impairment, one eye   • Class 2 obesity due to excess calories without serious comorbidity with body mass index (BMI) of 36 0 to 36 9 in adult   • Smoker   • Pacemaker   • JOVI (obstructive sleep apnea)   • Urinary incontinence   • Venous insufficiency (chronic) (peripheral)   • Peripheral arteriosclerosis ([facility])   • Hyponatremia   • Serum total bilirubin elevated   • Stage 3a chronic kidney disease ([facility])   • CAD (coronary artery disease)   • Status post primary angioplasty with coronary stent   • Mixed simple and mucopurulent chronic bronchitis ([facility])   • Hyperuricemia   • Bilateral leg edema   • Nephrolithiasis   • Former smoker   • Symptomatic anemia   • Vitamin D insufficiency   • Chronic constipation   • Idiopathic hematuria   • Kidney stone on left side   • Flank pain   • Obesity (BMI 30-39  9)   • Dyspnea on exertion   • Neuropathy   • Coronary artery arteriosclerosis   • Essential hypertension   • Memory difficulty   • COPD (chronic obstructive pulmonary disease) ([facility])   • Chronic diastolic heart failure ([facility] [address] )   • Fall   • Closed fracture of right femur ([facility] [address] )   • Valvular heart disease   • Pressure injury of right buttock, unstageable ([facility])   • Ambulatory dysfunction   • Generalized weakness   • Depression   • Dysphagia, pharyngoesophageal phase   • Gastro-esophageal reflux disease without esophagitis   • Generalized muscle weakness   • History of falling   • Iron deficiency anemia   • Major depressive disorder, recurrent, unspecified ([facility])   • Moderate to severe aortic stenosis   • Other abnormalities of gait and mobility   • Other specified polyneuropathies   • Pulmonary hypertension due to left heart disease ([facility])   • Presence of coronary angioplasty implant and graft   • Unspecified hearing loss, unspecified ear   • Chronic pain disorder   • Primary osteoarthritis of both knees   • Left hip pain   • Cervical strain   • Lump of skin of left lower extremity   • Elevated troponin   • Anemia   • Depression with anxiety   • MSSA bacteremia   • Acute encephalopathy   • Abnormal CAT scan     Past Medical History:   Diagnosis Date   • Arthritis    • Atrial flutter ([facility])    • Chronic kidney disease     stage 3   • Coronary artery disease     2 stents   • Fluid retention    • Gout    • Heart disease    • Heart failure ([facility])     pacemaker   • Hypertension    • Hyponatremia 04/08/2019   • Neurological disorder    • Pacemaker    • Pulmonary emphysema ([facility])    • Radiculopathy     last assessed 1/28/16    • Shortness of breath     exertion   • Sleep apnea     c pap     Past Surgical History:   Procedure Laterality Date   • ANGIOPLASTY      x2 2 stents and then replaced   • CARDIAC PACEMAKER PLACEMENT      pacemaker permanent placement dual chamber / last assessed 4/7/14 / implantation    • CARDIAC SURGERY      pacemaker   • CHOLECYSTECTOMY     • CORONARY ANGIOPLASTY WITH STENT PLACEMENT     • EPIDURAL BLOCK INJECTION N/A 05/26/2016    Procedure: BLOCK / INJECTION EPIDURAL STEROID LUMBAR  L4-5;  Surgeon: Kael Menendez MD;  Location: [facility] MAIN OR;  Service:    • EPIDURAL BLOCK INJECTION N/A 2019    Procedure: L4 L5 Lumbar Epidural Steroid Injection;  Surgeon: Kael Menendez MD;  Location: [facility] MAIN OR;  Service: Pain Management    • EYE SURGERY      cataract left   • HAND SURGERY     • HIP PINNING Left    • INSERT / REPLACE / REMOVE PACEMAKER     • IR TUNNELED CENTRAL LINE PLACEMENT  2022   • KNEE ARTHROSCOPY W/ MENISCAL REPAIR Left    • KNEE SURGERY     • LUMBAR EPIDURAL INJECTION N/A 2016    Procedure: BLOCK / INJECTION LUMBAR  L4-5  (C-ARM); Surgeon: Kael Menendez MD;  Location: [facility] MAIN OR;  Service:    • AK OPEN RX FEMUR FX+INTRAMED ELLA Right 2022    Procedure: INSERTION NAIL IM FEMUR ANTEGRADE (TROCHANTERIC);   Surgeon: Louisa Vizcarra MD;  Location: [facility] Main OR;  Service: Orthopedics     Family History   Problem Relation Age of Onset   • Cancer Mother 80        Cancer when 80 yrs old   • Heart disease Mother    • Hypertension Mother    • Heart disease Father    • Cancer Father         Heart   46   • Diabetes Neg Hx    • Stroke Neg Hx      Social History     Socioeconomic History   • Marital status: /Civil Union     Spouse name: Not on file   • Number of children: Not on file   • Years of education: Not on file   • Highest education level: Not on file   Occupational History   • Occupation: RETIRED   Tobacco Use   • Smoking status: Every Day     Packs/day: 0 25     Years: 50 00     Pack years: 12 50     Types: Cigarettes     Start date: 2022   • Smokeless tobacco: Former   • Tobacco comments:     quit 2021   Vaping Use   • Vaping Use: Never used   Substance and Sexual Activity   • Alcohol use: Never   • Drug use: No   • Sexual activity: Yes     Partners: Female     Birth control/protection: None   Other Topics Concern   • Not on file   Social History Narrative    Daily tea consumption 10 cups day      Social Determinants of Health     Financial Resource Strain: Not on file   Food Insecurity: No Food Insecurity   • Worried About [facility] in the Last Year: Never true   • Ran Out of Food in the Last Year: Never true   Transportation Needs: No Transportation Needs   • Lack of Transportation (Medical): No   • Lack of Transportation (Non-Medical):  No   Physical Activity: Not on file   Stress: Not on file   Social Connections: Not on file   Intimate Partner Violence: Not on file   Housing Stability: Low Risk    • Unable to Pay for Housing in the Last Year: No   • Number of Places Lived in the Last Year: 1   • Unstable Housing in the Last Year: No       Current Facility-Administered Medications:   •  acetaminophen (TYLENOL) tablet 650 mg, 650 mg, Oral, Q6H PRN, NAVA Coleman, 650 mg at 12/08/22 1531  •  albuterol inhalation solution 2 5 mg, 2 5 mg, Nebulization, Q6H PRN, Kel Arce, JAMILAHNP, 2 5 mg at 12/12/22 1715  •  allopurinol (ZYLOPRIM) tablet 200 mg, 200 mg, Oral, Daily, JAMILAH ColemanNP, 200 mg at 12/14/22 1270  •  atorvastatin (LIPITOR) tablet 40 mg, 40 mg, Oral, Daily With Dinner, NAVA Coleman, 40 mg at 12/13/22 1803  •  ceFAZolin (ANCEF) IVPB (premix in dextrose) 2,000 mg 50 mL, 2,000 mg, Intravenous, Q8H, Vane Dalal MD, Last Rate: 100 mL/hr at 12/14/22 0500, 2,000 mg at 12/14/22 0500  •  docusate sodium (COLACE) capsule 100 mg, 100 mg, Oral, BID, Ron Ellis MD, 100 mg at 12/14/22 0858  •  DULoxetine (CYMBALTA) delayed release capsule 60 mg, 60 mg, Oral, BID, NAVA Coleman, 60 mg at 12/14/22 0858  •  finasteride (PROSCAR) tablet 5 mg, 5 mg, Oral, Daily, NAVA Coleman, 5 mg at 12/14/22 0859  •  fluticasone-vilanterol 200-25 mcg/actuation 1 puff, 1 puff, Inhalation, Daily, NAVA Coleman, 1 puff at 12/14/22 0858  •  gabapentin (NEURONTIN) capsule 100 mg, 100 mg, Oral, HS, NAVA Coleman, 100 mg at 12/13/22 2242  •  isosorbide mononitrate (IMDUR) 24 hr tablet 30 mg, 30 mg, Oral, Daily, NAVA Coleman, 30 mg at 12/14/22 0858  •  lidocaine (LIDODERM) 5 % patch 1 patch, 1 patch, Topical, Daily, Shelby Mueller PA-C, 1 patch at 12/13/22 0930  •  methocarbamol (ROBAXIN) tablet 500 mg, 500 mg, Oral, Q6H PRN, Lynn Montano DO  •  ondansetron (ZOFRAN) injection 4 mg, 4 mg, Intravenous, Q6H PRN, Kel Arce, CRNP, 4 mg at 12/13/22 1150  •  pantoprazole (PROTONIX) EC tablet 40 mg, 40 mg, Oral, Early Morning, Kel Hanksrik, CRNP, 40 mg at 12/14/22 0500  •  polyethylene glycol (MIRALAX) packet 17 g, 17 g, Oral, Daily PRN, Kel Hanksrik, CRNP  •  polyethylene glycol (MIRALAX) packet 17 g, 17 g, Oral, Daily, Radha Nova MD, 17 g at 12/14/22 0857  •  saccharomyces boulardii (FLORASTOR) capsule 250 mg, 250 mg, Oral, BID, Kel Hanksrik, CRNP, 250 mg at 12/14/22 7630  •  senna (SENOKOT) tablet 8 6 mg, 1 tablet, Oral, HS, Radha Nova MD, 8 6 mg at 12/13/22 2242  •  torsemide (DEMADEX) tablet 20 mg, 20 mg, Oral, Daily, Selene See MD, 20 mg at 12/14/22 0492  •  umeclidinium 62 5 mcg/actuation inhaler AEPB 1 puff, 1 puff, Inhalation, Daily, Kel Arce, CRNP, 1 puff at 12/14/22 0858    No Known Allergies    Vitals:    12/14/22 0739   BP: 133/53   Pulse: 73   Resp: 18   Temp: (!) 97 4 °F (36 3 °C)   SpO2: 96%     Physical Exam  Constitutional:       Appearance: He is well-developed  Comments: Obese male, mild respiratory distress, no signs of pain   HENT:      Head: Normocephalic and atraumatic  Right Ear: External ear normal       Left Ear: External ear normal    Eyes:      Conjunctiva/sclera: Conjunctivae normal       Pupils: Pupils are equal, round, and reactive to light  Cardiovascular:      Rate and Rhythm: Normal rate and regular rhythm  Heart sounds: Normal heart sounds  Pulmonary:      Effort: Pulmonary effort is normal       Breath sounds: Normal breath sounds        Comments: Distant breath sounds bilaterally, scattered rhonchi  Abdominal:      General: Bowel sounds are normal  Palpations: Abdomen is soft  Comments: + Bowel sounds, nontender, soft, cannot palpate liver or spleen, no guarding   Musculoskeletal:         General: Normal range of motion  Cervical back: Normal range of motion and neck supple  Skin:     General: Skin is warm  Comments: Warm, scattered purpura, pale, slight yellow color, multiple scabs and abrasions on all 4 extremities   Neurological:      Mental Status: He is alert  Deep Tendon Reflexes: Reflexes are normal and symmetric  Psychiatric:         Behavior: Behavior normal       Comments: Patient was pleasant and responsive, response time delayed, patient did not seem to know about the brain lesion or pulmonary lesions     Extremities: 1+ bilateral lower extremity edema plus obesity, chronic venous changes, pulses are decreased, no obvious cords  Lymphatics: No adenopathy in the neck, supraclavicular region, axilla bilaterally    Labs    12/14/2022 WBC = 8 98 hemoglobin = 7 7 hematocrit = 25 4 MCV = 88 platelet = 609 sodium = 129 chloride = 91 potassium = 4 2 BUN = 50 creatinine = 1 88 calcium = 8 6    12/12/2022 differential: Neutrophil = 82% bands = 4% lymphocyte = 7% monocyte = 5%    12/5/2022 AST = 33 ALT = 23 alkaline phosphatase = 85 total protein = 7 3 albumin = 2 6 total bilirubin = 1 70    Imaging    12/12/2022 CT head without contrast    IMPRESSION:     1   Persistent small patchy subcortical white matter hypodensities involving the high right frontoparietal, right lateral frontal and right posterior parietal regions concerning for vasogenic edema related to metastatic foci  MRI of the brain with   contrast is recommended for further evaluation  2   Mild chronic microtraumatic ischemic changes  12/6/2022 CAT scan chest without contrast    IMPRESSION:     1  Numerous new pulmonary nodules, concerning for metastatic disease  2  Mediastinal and hilar lymphadenopathy, also concerning for metastases  3    Small right pleural effusion    11/19/2022 CAT scan abdomen pelvis without contrast    IMPRESSION:     No acute abnormality within the abdomen or pelvis    Specifically, no retroperitoneal hematoma identified

## 2022-12-14 NOTE — PROGRESS NOTES
Progress Note - Infectious Disease   Owen Baig 80 y o  male MRN: [id number]  Unit/Bed#: [address] [place] [address] Encounter: [id number]      Impression/Plan:  1  MSSA bacteremia  POA, 2/2 admission blood cultures growing MSSA  Unclear source, may be skin translocation as the patient has multiple scabs on his arms and legs  This is complicated by the presence of a PPM  TTE with AV and MV thickening, no clear vegetations  MARY with no obvious large vegetations, but given significant calcification cannot rule out small lesion  CT chest with numerous pulmonary nodules, could be septic emboli vs less likely metastatic disease  This does raise concern for a primary endovascular source of infection  Discussed treatment options with the family and Cardiology  PPM removal is recommended in the setting of staph bacteremia regardless of the primary source, however the pacemaker has been in place 12 years and removal would be an extensive surgical procedure  Given his comorbidities, there are risks to surgery  His family would like to pursue a more conservative approach with antibiotics before any surgery  Repeat blood cultures no growth  -continues IV Cefazolin 2g q8hr  -Plan to complete a 6 week course of antibiotics from blood culture clearance through 1/17/23 (script provided to CM)  -Weekly CBC with differential and BMP on IV antibiotics  -will need surveillance blood cultures 2 weeks after completion of antibiotic course  -if there is recurrence the patient will require pacemaker extraction  -outpatient ID follow-up on 12/23/22 at 8:30 am     2  Weakness, encephalopathy  Likely toxic metabolic due to infection as above  Karlos [address]ell 92 showed a hypodensity in the right parieto-occipital region most likely a chronic infarct or metastatic lesion  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   Encephalopathy has improved  The patient appears depressed with flat affect    Noted weakness holding his head up today, but no neck pain   CT of the cervical spine shows no abnormalities  -continue treatment of infection as above  -neurology follow up  -monitor mental status  -PT/OT     3  Leukocytosis  Likely due to #1 above  Patient is afebrile, hemodynamically stable  WBC count stable  -continue antibiotics as above  -monitor WBC count, fever curve     4  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  Complicated by MSSA bacteremia  MARY no clear vegetations   -continue management as above     5  Pulmonary nodules and adenopathy  CT chest shows numerous new pulmonary nodules and mediastinal and hilar lymphadenopathy, concerning for metastases however in setting of staph aureus bacteremia could be septic emboli  Patient had a colonoscopy last month without abnormalities  He is a long term smoker  -pulmonary consulted  -will need serial imaging after 4-6 weeks of antibiotics; if persistent will require work up for malignancy     6  CKD  Creatinine stable at baseline  Antibiotics dose adjusted as needed      I have discussed the above management plan in detail with Dr Alona Jacobsen and the patient  ID will follow      Antibiotics:  Cefazolin day 6     Subjective:  Patient has no fever, chills, sweats; no nausea, vomiting, diarrhea; no cough, shortness of breath; no pain complaints at present  No new symptoms  Objective:  Vitals:  Temp:  [97 2 °F (36 2 °C)-97 4 °F (36 3 °C)] 97 4 °F (36 3 °C)  HR:  [69-73] 73  Resp:  [18-24] 18  BP: (112-133)/(53-65) 133/53  SpO2:  [96 %-98 %] 96 %  Temp (24hrs), Av 3 °F (36 3 °C), Min:97 2 °F (36 2 °C), Max:97 4 °F (36 3 °C)  Current: Temperature: (!) 97 4 °F (36 3 °C)    Physical Exam:   General Appearance:  80year old male, chronically debilitated, resting propped comfortably in bed, nontoxic, no acute distress     HEENT: Atraumatic normocephalic   Throat: Oropharynx moist     Pulmonary:   Normal respiratory excursion without accessory muscle use   Cardiac:  RRR, pacer site nontender   Abdomen:   Soft, non-tender, non-distended Extremities: No edema   : No denney, no SPT   Psychiatric: Awake, cooperative   Skin: No new rashes  Right chest PICC site nontender          Labs, Imaging, & Other studies:   All pertinent labs and imaging studies were personally reviewed  Results from last 7 days   Lab Units 12/14/22  0457 12/13/22  0519 12/12/22  0442   WBC Thousand/uL 8 98 11 59* 12 77*   HEMOGLOBIN g/dL 7 7* 8 3* 8 3*   PLATELETS Thousands/uL 232 249 227     Results from last 7 days   Lab Units 12/14/22  0457 12/13/22  1559 12/13/22  0519   SODIUM mmol/L 129* 127* 128*   POTASSIUM mmol/L 4 2 4 3 4 4   CHLORIDE mmol/L 91* 92* 92*   CO2 mmol/L 28 29 26   BUN mg/dL 50* 54* 48*   CREATININE mg/dL 1 88* 1 97* 1 86*   EGFR ml/min/1 73sq m 32 30 32   CALCIUM mg/dL 8 6 8 7 8 8

## 2022-12-14 NOTE — ASSESSMENT & PLAN NOTE
CT Chest showed numerous new pulmonary nodules concerning for metastatic disease along with mediastinal and hilar lymphadenopathy and small right pleural effusion  · Patient had CT abdomen pelvis without contrast on 11/19/2022 which was unremarkable  · CT of the brain also showing hypodensity in the right parieto-occipital region which might represent chronic infarct/metastasis  Repeat CT as noted above  · Pulmonary was consulted who recommended IR guided lung biopsy  · Patient did have a colonoscopy during prior hospitalization which showed no masses  · No history of malignancy as per family  · Bilateral lung nodules could also be related to septic emboli  Will need repeat CT scan in 3 months    If the nodules are improving patient might not need further work-up but if the nodules are persistent might need biopsy

## 2022-12-14 NOTE — PROGRESS NOTES
[address]  Progress Note Hector Gale 1940, 80 y o  male MRN: [id number]  Unit/Bed#: 2 [place] [address] Encounter: [id number]  Primary Care Provider: Isma Blevins DO   Date and time admitted to hospital: 12/5/2022  6:39 PM    * MSSA bacteremia  Assessment & Plan  Patient presented with somnolence for 24 hours and generalized weakness  Reported a little cough and runny nose, denies sore throat/SOB/fever/chills  · Patient hospitalized in November 2022 for symptomatic anemia, hemoglobin 7 1 on admission with positive stool guaiac, received 1 unit of PRBC, underwent EGD and colonoscopy both showed no evidence of bleeding, patient was recommended outpatient capsule endoscopy which is still pending   Plavix discontinued on discharge, patient remained on Xarelto  · WBC 15 38, repeat 14 80, no bands, patient afebrile initially  · Lactic acid normal   Procalcitonin elevated  · Chest x-ray showed no evidence of pneumonia  · UA no evidence of infection   · COVID-19, flu RSV negative  · CT chest showed -  Numerous new pulmonary nodules, concerning for mets; Mediastinal and hilar lymphadenopathy, concerning for metastases; Small right pleural effusion with adjacent basal opacity, most likely represents atelectasis  Nodules could possibly be septic emboli  · Patient was given Zosyn in the ED, de-escalated to Rocephin  Currently on Ancef 2 g every 8 hours  · Blood cultures 2 out of 2 growing Methicillin sensitive Staphylococcus aureus     · ID input appreciated  · Echocardiogram showed EF of 60% with severe concentric hypertrophy, EF of 60% without any visible vegetations with severe attic stenosis  · Patient underwent MARY which showed EF of 55% without any large vegetation but cannot rule out small vegetation due to calcium and acoustic shadowing  · Unclear source-possible from skin translocation as patient noted to have multiple scabs  · Pacemaker removal was recommended by ID  per cardiology, pacemaker has been in place for 12 years and removal would be an extensive surgical procedure  Given multiple comorbidities cardiology recommended conservative approach and treatment with IV antibiotics  · Repeat blood cultures from 12/7/2022 with no growth   · Anticipate course of 6 weeks of antibiotics from the date of blood culture clearance  · Status post PICC line placement    Acute encephalopathy  Assessment & Plan  Likely acute toxic metabolic encephalopathy due to underlying infection, possible mets  Reported feeling sleepy and weak  · CT head - Hypodensity in the right parieto-occipital region which could be chronic infarct versus metastatic disease based on the CT of the chest  · Mental status has improved significantly with treatment of sepsis  · ABG no hypercapnia  · Patient was initially worked up for possible stroke  · Neurology input appreciated  · Continue Xarelto 15 mg p o  daily and Lipitor  · Patient could not get MRI of the brain due to incompatible pacemaker  · Was given full dose aspirin in the ED along with Lipitor 80 mg  · Hemoglobin A1c was 5 1  · Repeat CT head 12/12 which shows persistent small patchy hypodensities concerning for vasogenic edema related to metastatic foci  · PT/OT evaluation and treatment    Chronic diastolic heart failure ([address] )  Assessment & Plan  Wt Readings from Last 3 Encounters:   12/07/22 120 kg (265 lb)   11/22/22 121 kg (265 lb 10 5 oz)   10/20/22 120 kg (265 lb)     On Demadex 40 mg p o  Daily at home  · 2D echo in January 2022 showed normal EF, 65%, moderate concentric hypertrophy, dilated atriums, severe AS, mild to moderate AR, moderate MS  · Chest X-ray showed cardiomegaly with moderate congestive changes  · 1-2+ lower leg edema on exam, albumin 2 6  BNP was elevated at 14,000  · Demadex was held initially in the setting of sepsis and then later in the setting of worsening creatinine    Also received dose of Lasix on 12/8 and 12/9  · Currently restarted on 20 mg of Demadex daily    Abnormal CAT scan  Assessment & Plan  CT chest - numerous new pulmonary nodules concerning for metastatic disease along with mediastinal and hilar lymphadenopathy and small right pleural effusion  · Patient had CT abdomen pelvis without contrast on 11/19/2022 which was unremarkable  · CT of the brain also showing hypodensity in the right parieto-occipital region which might represent chronic infarct/metastasis  Repeat CT as noted above  · Discussed with neurology who is concerned for metastatic lesion on CT brain  Oncology was consulted  Neurology to discuss findings with radiation oncology tomorrow for further recommendations  · Patient did have a colonoscopy during prior hospitalization which showed no masses  · No history of malignancy as per family  · Pulmonary recommended IR guided lung biopsy  Lung nodules could also be related to septic emboli  Per ID, will need repeat CT scan in 3 months  If the nodules are improving patient might not need further work-up but if the nodules are persistent might need biopsy      Elevated troponin  Assessment & Plan  Mild troponin elevation most likely non MI troponin elevation secondary to sepsis  · EKG showed paced rhythm Per prior provider  · Patient also complained of some chest pain on 12/8/22 and repeat troponin remains around 700  · Repeat EKG showed some inferolateral T wave inversion per prior provider  · Suspected secondary to symptomatic anemia  Symptoms improved after 1 unit of PRBC transfusion      Hyponatremia  Assessment & Plan  Results from last 7 days   Lab Units 12/14/22  0457 12/13/22  1559 12/13/22  0519 12/12/22  0442 12/11/22  0643 12/10/22  0611 12/09/22  0515 12/08/22  0614   SODIUM mmol/L 129* 127* 128* 130* 134* 131* 133* 131*     In the setting of volume overload  · Demadex was held in the setting of worsening creatinine but restarted again on 12/13  · Sodium level slowly improving  Nephrology consulted  · Continue on 1200 mL fluid restriction    Depression with anxiety  Assessment & Plan  Continue Cymbalta    Anemia  Assessment & Plan  Baseline hemoglobin appears to be around 7-9s  · Hemoglobin 9 1, repeat 8 1 in ED  No evidence of active bleeding  · Received 1 unit PRBC transfusion during this admission  · Stool for occult blood was negative x1  · Hemoglobin overall stable    Results from last 7 days   Lab Units 12/14/22  0457 12/13/22  0519 12/12/22  0442 12/11/22  0643 12/10/22  0611 12/09/22  0515 12/08/22  0614   HEMOGLOBIN g/dL 7 7* 8 3* 8 3* 8 3* 7 9* 7 6* 7 3*   HEMATOCRIT % 25 4* 27 1* 27 0* 26 5* 25 4* 24 6* 23 7*   MCV fL 88 87 87 85 86 87 86         Moderate to severe aortic stenosis  Assessment & Plan  Severe AS on echo in 1/2022  · Underwent MARY showed mild to moderate concentric hypertrophy with EF of 55% with moderately thickened aortic valve with severe aortic valve stenosis    Gastro-esophageal reflux disease without esophagitis  Assessment & Plan  Continue Protonix    COPD (chronic obstructive pulmonary disease) (HCC)  Assessment & Plan  On Trelegy, albuterol nebulizer p r n  At home  · Was initially on 2 L oxygen but currently remains on room air  · Substituted Trelegy with Breo and Incruse  · Continue albuterol as needed      Obesity (BMI 30-39  9)  Assessment & Plan  Body mass index is 34 02 kg/m²  · Diet and lifestyle modification    CAD (coronary artery disease)  Assessment & Plan  Status post PCI with stents x 2   · Continue statin, Imdur  · Patient was recommended to stop Plavix during prior admission      Stage 3a chronic kidney disease [facility])  Assessment & Plan  Lab Results   Component Value Date    EGFR 32 12/14/2022    EGFR 30 12/13/2022    EGFR 32 12/13/2022    CREATININE 1 88 (H) 12/14/2022    CREATININE 1 97 (H) 12/13/2022    CREATININE 1 86 (H) 12/13/2022     Baseline creatinine appears to be around 1 3-1 6s    · Creatinine continues to remain stable around baseline  · Repeat lab work in AM   Monitor while on diuretic    JOVI (obstructive sleep apnea)  Assessment & Plan  Continue CPAP at HS    Hyperlipidemia  Assessment & Plan  Continue statin    Chronic a-fib [facility])  Assessment & Plan  Status post pacemaker insertion  · Continue Xarelto      Benign prostatic hyperplasia  Assessment & Plan  Continue Proscar    Benign hypertension with chronic kidney disease, stage III (HCC)  Assessment & Plan  · Continue Demadex as noted above      VTE Pharmacologic Prophylaxis:   Xarelto    Patient Centered Rounds: I performed bedside rounds with nursing staff today  Discussions with Specialists or Other Care Team Provider: yes - neuro, oncology    Education and Discussions with Family / Patient: Updated  (wife) at bedside  Time Spent for Care: 50 min  More than 50% of total time spent on counseling and coordination of care as described above  Current Length of Stay: 8 day(s)  Current Patient Status: Inpatient   Certification Statement: The patient will continue to require additional inpatient hospital stay due to Hyponatremia requiring monitoring of lab work overnight  Discharge Plan: Anticipate discharge in 24-48 hrs to rehab facility  Code Status: Level 1 - Full Code    Subjective:     Patient reports some improvement in his neck pain after starting muscle relaxer  Wants to be discharged as soon as possible    Objective:     Vitals:   Temp (24hrs), Av 3 °F (36 3 °C), Min:97 2 °F (36 2 °C), Max:97 4 °F (36 3 °C)    Temp:  [97 2 °F (36 2 °C)-97 4 °F (36 3 °C)] 97 4 °F (36 3 °C)  HR:  [69-73] 73  Resp:  [18-24] 18  BP: (112-133)/(53-65) 133/53  SpO2:  [96 %-98 %] 96 %  Body mass index is 34 02 kg/m²  Input and Output Summary (last 24 hours): Intake/Output Summary (Last 24 hours) at 2022 1027  Last data filed at 2022 0754  Gross per 24 hour   Intake --   Output 650 ml   Net -650 ml       Physical Exam:   Physical Exam  Vitals reviewed  Constitutional:       General: He is not in acute distress  Appearance: He is ill-appearing (Chronically)  HENT:      Head: Normocephalic and atraumatic  Eyes:      General:         Right eye: No discharge  Left eye: No discharge  Cardiovascular:      Rate and Rhythm: Normal rate and regular rhythm  Pulmonary:      Effort: Pulmonary effort is normal  No respiratory distress  Breath sounds: No wheezing or rales  Comments: Decreased breath sounds bilaterally  Abdominal:      General: Bowel sounds are normal  There is no distension  Palpations: Abdomen is soft  Tenderness: There is no abdominal tenderness  Musculoskeletal:      Right lower leg: Edema present  Left lower leg: Edema present  Neurological:      Mental Status: He is alert and oriented to person, place, and time  Additional Data:     Labs:  Results from last 7 days   Lab Units 12/14/22  0457 12/13/22  0519 12/12/22  0442 12/11/22  0643 12/10/22  0611   WBC Thousand/uL 8 98   < > 12 77*   < > 13 62*   HEMOGLOBIN g/dL 7 7*   < > 8 3*   < > 7 9*   HEMATOCRIT % 25 4*   < > 27 0*   < > 25 4*   PLATELETS Thousands/uL 232   < > 227   < > 170   BANDS PCT %  --   --  4  --   --    NEUTROS PCT %  --   --   --   --  85*   LYMPHS PCT %  --   --   --   --  6*   LYMPHO PCT %  --   --  7*  --   --    MONOS PCT %  --   --   --   --  7   MONO PCT %  --   --  5  --   --    EOS PCT %  --   --  0  --  0    < > = values in this interval not displayed       Results from last 7 days   Lab Units 12/14/22  0457   SODIUM mmol/L 129*   POTASSIUM mmol/L 4 2   CHLORIDE mmol/L 91*   CO2 mmol/L 28   BUN mg/dL 50*   CREATININE mg/dL 1 88*   ANION GAP mmol/L 10   CALCIUM mg/dL 8 6   GLUCOSE RANDOM mg/dL 115                       Lines/Drains:  Invasive Devices     Central Venous Catheter Line  Duration           CVC Central Lines 12/12/22 Single Right  1 day                Central Line:  Goal for removal: N/A - Discharging with PICC for IV ABX/medications             Imaging: Reviewed radiology reports from this admission including: CT head    Recent Cultures (last 7 days):         Last 24 Hours Medication List:   Current Facility-Administered Medications   Medication Dose Route Frequency Provider Last Rate   • acetaminophen  650 mg Oral Q6H PRN NAVA Coleman     • albuterol  2 5 mg Nebulization Q6H PRN NAVA Coleman     • allopurinol  200 mg Oral Daily NAVA Coleman     • atorvastatin  40 mg Oral Daily With NAVA French     • cefazolin  2,000 mg Intravenous Q8H Martha Wade MD 2,000 mg (12/14/22 0500)   • docusate sodium  100 mg Oral BID Martha Wade MD     • DULoxetine  60 mg Oral BID NAVA Coleman     • finasteride  5 mg Oral Daily NAVA Coleman     • fluticasone-vilanterol  1 puff Inhalation Daily NAVA Coleman     • gabapentin  100 mg Oral HS NAVA Coleman     • isosorbide mononitrate  30 mg Oral Daily NAVA Coleman     • lidocaine  1 patch Topical Daily Shelby Mueller PA-C     • methocarbamol  500 mg Oral Q6H PRN Lynn Montano DO     • ondansetron  4 mg Intravenous Q6H PRN NAVA Coleman     • pantoprazole  40 mg Oral Early Morning NAVA Coleman     • polyethylene glycol  17 g Oral Daily PRN NAVA Coleman     • polyethylene glycol  17 g Oral Daily Martha Wade MD     • rivaroxaban  15 mg Oral Daily With Breakfast NAVA Coleman     • saccharomyces boulardii  250 mg Oral BID NAVA Coleman     • senna  1 tablet Oral HS Martha Wade MD     • torsemide  20 mg Oral Daily Mikey Root MD     • umeclidinium  1 puff Inhalation Daily NAVA Coleman          Today, Patient Was Seen By: Katie Stanley DO    **Please Note: This note may have been constructed using a voice recognition system  **

## 2022-12-14 NOTE — PROGRESS NOTES
Neurology Consult Follow Up      Ranjeet Youssef is a 80 y o  male  [address] [facility] [address] [place] [address]    [id number]        Assessment/Recommendations:       1  Metastatic Disease of Brain  2  Metabolic encephalopathy  3  Chronic CHF  4  MSSA Bacteremia  5  Elevated troponins  6  Poss  Metastatic Lung cancer     - Monitored on telemetry  - Fall risk  - Neurological assessments  -Xarelto 15 mg daily  -Zocor 20 mg daily  - Unable to get MRI of the brain with and without contrast secondary to incompatible pacemaker  -Repeat CTH done 12/12/2022, noted persistent patchy subcortical white matter hypodensity c/w vasogenic edema r/t metastatic foci  - PT/OT/ST  - Pt with PMH of lung Ca, with evidence of metastatic disease in the brain  Recommendation having Oncology evaluation for treatment recommendations going forward  Question Lung Ca as primary source or alternate source present       Patient is an 27-year-old male with PMH of CKD, CAD, atrial flutter with pacemaker, sleep apnea and lung CA who has had recent onset of generalized weakness and somnolence  Patient reported being fatigued and somnolent x24 hours  Patient was brought to the ER, he presented with elevated creatinine and BUN  Also has higher troponins from last admission at 325 and positive leukocytosis with anemia hemoglobin 9 1  Patient has recent admission, discharged on 11/22/2022 after having GI bleed  He had been on Xarelto and Plavix prior to that hospital stay  His Plavix was discontinued at that time  Patient has no focal deficits on exam today, he has equal strength in the upper extremities  He has equal lower extremity strength with 4/5 on the hip, remaining lower extremity muscle groups 4+/5  Patient has equal sensation and reflexes  He is able to stand and bear weight with the assistance of a rolling walker  He does get very short of breath and desaturates with activity and movement    Cranial nerves II through XII intact with exception to left eye strabismus  Left eye does leg with EOM  Patient had a CTh showing hypodensity in the right parietal-occipital lobe, initially suspected possible chronic infarct however with history of lung CA clarification indicating the possibility that this is metastasis  Patient was recommended MRI of the brain with and without contrast for further characterization however unable to obtain due to patient's pacemaker being incompatible  A repeat CTH was completed a week later, noted ongoing findings of consistent with vasogenic edema related to a metastatic foci  Again recommended MRI, however, pt is unable to have MRI due to pacer incompatibility  Medical team has been working on bacteremia, kidneys, electrolytes etc  Would recommend having Oncology evaluation of findings and recommendations for further treatment or studies to confirm a primary source           Walker García will not need outpatient follow up with Neurology  He will not require outpatient neurological testing  Chief Complaint:    Subjective:   Pt indicated that he feels better today than he has in a while  No reports of headache, dizziness, lightheadedness    + reports of LE weakness, noted need for STR    Past Medical History:   Diagnosis Date   • Arthritis    • Atrial flutter (HCC)    • Chronic kidney disease     stage 3   • Coronary artery disease     2 stents   • Fluid retention    • Gout    • Heart disease    • Heart failure (HCC)     pacemaker   • Hypertension    • Hyponatremia 04/08/2019   • Neurological disorder    • Pacemaker    • Pulmonary emphysema ([facility] [address] )    • Radiculopathy     last assessed 1/28/16    • Shortness of breath     exertion   • Sleep apnea     c pap     Social History     Socioeconomic History   • Marital status: /Civil Union     Spouse name: Not on file   • Number of children: Not on file   • Years of education: Not on file   • Highest education level: Not on file   Occupational History   • Occupation: RETIRED   Tobacco Use   • Smoking status: Every Day     Packs/day: 0 25     Years: 50 00     Pack years: 12 50     Types: Cigarettes     Start date: 2022   • Smokeless tobacco: Former   • Tobacco comments:     quit 2021   Vaping Use   • Vaping Use: Never used   Substance and Sexual Activity   • Alcohol use: Never   • Drug use: No   • Sexual activity: Yes     Partners: Female     Birth control/protection: None   Other Topics Concern   • Not on file   Social History Narrative    Daily tea consumption 10 cups day      Social Determinants of Health     Financial Resource Strain: Not on file   Food Insecurity: No Food Insecurity   • Worried About Running Out of Food in the Last Year: Never true   • Ran Out of Food in the Last Year: Never true   Transportation Needs: No Transportation Needs   • Lack of Transportation (Medical): No   • Lack of Transportation (Non-Medical): No   Physical Activity: Not on file   Stress: Not on file   Social Connections: Not on file   Intimate Partner Violence: Not on file   Housing Stability: Low Risk    • Unable to Pay for Housing in the Last Year: No   • Number of Places Lived in the Last Year: 1   • Unstable Housing in the Last Year: No     Family History   Problem Relation Age of Onset   • Cancer Mother 80        Cancer when 80 yrs old   • Heart disease Mother    • Hypertension Mother    • Heart disease Father    • Cancer Father         Heart   46   • Diabetes Neg Hx    • Stroke Neg Hx        ROS:  Please see HPI for positive symptoms  No fever, no chills, no weight change  Ocular: No diplopia, no blurred vision, spot/zigzag lines   HEENT:  No sore throat, headache or congestion  No neck pain  COR:  No chest pain  No palpitations  Lungs:  no sob  GI:  no  nausea, no vomiting, no diarrhea, no constipation, no anorexia  :  No dysuria, frequency, or urgency  No hematuria  Musculoskeletal:  No joint pain or swelling   Skin:  No rash or itching     Psychiatric:  no anxiety, no depression  Endocrine:  No polyuria or polydipsia  Objective:  /53 (BP Location: Right arm)   Pulse 73   Temp (!) 97 4 °F (36 3 °C) (Tympanic)   Resp 18   Ht 6' 2" (1 88 m)   Wt 120 kg (265 lb)   SpO2 96%   BMI 34 02 kg/m²     General: alert   Mental status: oriented x3  Attention: normal  Knowledge: fair  Language and Speech: normal  Cranial nerves:   CN II: Visual fields are full to confrontation  Fundoscopic exam is normal with sharp discs and no vascular changes  Pupils are 3 mm and briskly reactive to light  CN III, IV, VI: At primary gaze, there is no eye deviation  CN V: Facial sensation is intact in all 3 divisions bilaterally  Corneal responses are intact  CN VII: Face is symmetrical, with normal eye closure and smile  CN VIII: Hearing is normal to rubbing fingers  CN IX, X: Palate elevates symmetrically  Phonation is soft slightly gravelly  CN XI: Head turning and shoulder shrug are intact  CN XII: Tongue is midline with normal movements and no atrophy  Motor: There is no pronator drift of out-stretched arms  Muscle bulk and tone are normal       Muscle exam  Arm Right Left Leg Right Left   Deltoid 5/5 5/5 Iliopsoas 4/5 4/5   Biceps 5/5 5/5 Quads 4/5 4/5   Triceps 5/5 5/5 Hamstrings 4/5 4/5   Wrist Extension 5/5 5/5 Ankle Dorsi Flexion 4+/5 4+/5   Wrist Flexion 5/5 5/5 Ankle Plantar Flexion 4+/5 4+/5       Sensory: normal to light touch, temperature, vibration   Proprioception intact  Gait: deferred d/t safety  Coordination: finger to nose intact, difficulty with heel to shin 2nd to weakness     Reflexes    RJ BJ TJ KJ AJ Plantars Ledezma's   Right 2+ 2+ 2+ 2+ 2+ Downgoing Not present   Left 2+ 2+ 2+ 2+ 2+ Downgoing Not present      Heart: Regular rate and rhythm  Lung: audible crackling and wheezing noted  Abd: soft, non-tender, non-distended   Skin: dry and intact  Generalized edema    Labs:      Lab Results   Component Value Date    WBC 8 98 12/14/2022    HGB 7 7 (L) 12/14/2022    HCT 25 4 (L) 12/14/2022    MCV 88 12/14/2022     12/14/2022     Lab Results   Component Value Date    HGBA1C 5 1 12/06/2022     Lab Results   Component Value Date    ALT 23 12/05/2022    AST 33 12/05/2022    ALKPHOS 85 12/05/2022    BILITOT 0 7 12/11/2017     Lab Results   Component Value Date    GLUCOSE 129 (H) 12/11/2017    CALCIUM 8 6 12/14/2022     12/11/2017    K 4 2 12/14/2022    CO2 28 12/14/2022    CL 91 (L) 12/14/2022    BUN 50 (H) 12/14/2022    CREATININE 1 88 (H) 12/14/2022         Review of reports and notes reveal:   Independent review of films/reports:  XR abdomen obstruction series    Result Date: 12/12/2022  No evidence of small bowel obstruction or free air Moderate amount of colonic stool Workstation performed: [id number]     CT head wo contrast    Result Date: 12/12/2022  1  Persistent small patchy subcortical white matter hypodensities involving the high right frontoparietal, right lateral frontal and right posterior parietal regions concerning for vasogenic edema related to metastatic foci  MRI of the brain with contrast is recommended for further evaluation  2   Mild chronic microtraumatic ischemic changes  The study was marked in EPIC for significant notification  Workstation performed: [id number]     CT spine cervical wo contrast    Result Date: 12/12/2022  1  The cervical vertebral body heights are maintained demonstrating no acute fracture or subluxation  2  Multilevel cervical degenerative disc disease similar to 11/13/2018  3  New focal nodular lesion within the anterior right lung apex  Please refer to the CT chest examination of 12/6/2022 for further details  Small bilateral pleural effusions, new on the left  Workstation performed: [id number]     IR tunneled central line placement    Result Date: 12/13/2022  Insertion of right-sided dual-lumen tunneled small bore central venous catheter, with tip in the expected location of the cavoatrial junction  Plan: The catheter may be used immediately  Workstation performed: [id number]         Thank you for this consult  Total time of encounter:  40 min  More than 50% of the time was used in counseling and/or coordination of care  Extent of couseling and/or coordination of care with pt re: symptoms, plans for rehabilitation and follow up recommendations    D/W medical team and attending Neurology MD

## 2022-12-15 ENCOUNTER — APPOINTMENT (INPATIENT)
Dept: RADIOLOGY | Facility: HOSPITAL | Age: 82
End: 2022-12-15

## 2022-12-15 LAB
ALBUMIN SERPL BCP-MCNC: 1.7 G/DL (ref 3.5–5)
ALP SERPL-CCNC: 118 U/L (ref 46–116)
ALT SERPL W P-5'-P-CCNC: 9 U/L (ref 12–78)
ANION GAP SERPL CALCULATED.3IONS-SCNC: 6 MMOL/L (ref 4–13)
AST SERPL W P-5'-P-CCNC: 46 U/L (ref 5–45)
BILIRUB SERPL-MCNC: 0.54 MG/DL (ref 0.2–1)
BUN SERPL-MCNC: 48 MG/DL (ref 5–25)
CALCIUM ALBUM COR SERPL-MCNC: 10.2 MG/DL (ref 8.3–10.1)
CALCIUM SERPL-MCNC: 8.4 MG/DL (ref 8.3–10.1)
CHLORIDE SERPL-SCNC: 95 MMOL/L (ref 96–108)
CO2 SERPL-SCNC: 30 MMOL/L (ref 21–32)
CREAT SERPL-MCNC: 1.69 MG/DL (ref 0.6–1.3)
ERYTHROCYTE [DISTWIDTH] IN BLOOD BY AUTOMATED COUNT: 20.5 % (ref 11.6–15.1)
GFR SERPL CREATININE-BSD FRML MDRD: 37 ML/MIN/1.73SQ M
GLUCOSE SERPL-MCNC: 112 MG/DL (ref 65–140)
GLUCOSE SERPL-MCNC: 205 MG/DL (ref 65–140)
HCT VFR BLD AUTO: 25.7 % (ref 36.5–49.3)
HGB BLD-MCNC: 7.8 G/DL (ref 12–17)
MAGNESIUM SERPL-MCNC: 2.2 MG/DL (ref 1.6–2.6)
MCH RBC QN AUTO: 26.5 PG (ref 26.8–34.3)
MCHC RBC AUTO-ENTMCNC: 30.4 G/DL (ref 31.4–37.4)
MCV RBC AUTO: 87 FL (ref 82–98)
NT-PROBNP SERPL-MCNC: 5508 PG/ML
PLATELET # BLD AUTO: 210 THOUSANDS/UL (ref 149–390)
PMV BLD AUTO: 9.1 FL (ref 8.9–12.7)
POTASSIUM SERPL-SCNC: 4.1 MMOL/L (ref 3.5–5.3)
PROT SERPL-MCNC: 6.9 G/DL (ref 6.4–8.4)
RBC # BLD AUTO: 2.94 MILLION/UL (ref 3.88–5.62)
SODIUM SERPL-SCNC: 131 MMOL/L (ref 135–147)
WBC # BLD AUTO: 7.55 THOUSAND/UL (ref 4.31–10.16)

## 2022-12-15 RX ORDER — HEPARIN SODIUM 5000 [USP'U]/ML
5000 INJECTION, SOLUTION INTRAVENOUS; SUBCUTANEOUS EVERY 8 HOURS SCHEDULED
Status: DISCONTINUED | OUTPATIENT
Start: 2022-12-15 | End: 2022-12-16

## 2022-12-15 RX ORDER — METHYLPREDNISOLONE SODIUM SUCCINATE 40 MG/ML
40 INJECTION, POWDER, LYOPHILIZED, FOR SOLUTION INTRAMUSCULAR; INTRAVENOUS EVERY 12 HOURS SCHEDULED
Status: DISCONTINUED | OUTPATIENT
Start: 2022-12-15 | End: 2022-12-18

## 2022-12-15 RX ORDER — LIDOCAINE 50 MG/G
1 PATCH TOPICAL ONCE
Status: COMPLETED | OUTPATIENT
Start: 2022-12-15 | End: 2022-12-16

## 2022-12-15 RX ORDER — ALBUTEROL SULFATE 2.5 MG/3ML
2.5 SOLUTION RESPIRATORY (INHALATION) EVERY 4 HOURS PRN
Status: DISCONTINUED | OUTPATIENT
Start: 2022-12-15 | End: 2022-12-20 | Stop reason: HOSPADM

## 2022-12-15 RX ORDER — FUROSEMIDE 10 MG/ML
20 INJECTION INTRAMUSCULAR; INTRAVENOUS ONCE
Status: COMPLETED | OUTPATIENT
Start: 2022-12-15 | End: 2022-12-15

## 2022-12-15 RX ORDER — IPRATROPIUM BROMIDE AND ALBUTEROL SULFATE 2.5; .5 MG/3ML; MG/3ML
3 SOLUTION RESPIRATORY (INHALATION) ONCE
Status: DISCONTINUED | OUTPATIENT
Start: 2022-12-15 | End: 2022-12-15

## 2022-12-15 RX ORDER — TORSEMIDE 20 MG/1
40 TABLET ORAL DAILY
Status: DISCONTINUED | OUTPATIENT
Start: 2022-12-16 | End: 2022-12-15

## 2022-12-15 RX ORDER — ALBUTEROL SULFATE 2.5 MG/3ML
2.5 SOLUTION RESPIRATORY (INHALATION)
Status: DISCONTINUED | OUTPATIENT
Start: 2022-12-15 | End: 2022-12-16

## 2022-12-15 RX ADMIN — FLUTICASONE FUROATE AND VILANTEROL TRIFENATATE 1 PUFF: 200; 25 POWDER RESPIRATORY (INHALATION) at 08:08

## 2022-12-15 RX ADMIN — DOCUSATE SODIUM 100 MG: 100 CAPSULE, LIQUID FILLED ORAL at 08:07

## 2022-12-15 RX ADMIN — FINASTERIDE 5 MG: 5 TABLET, FILM COATED ORAL at 08:07

## 2022-12-15 RX ADMIN — PANTOPRAZOLE SODIUM 40 MG: 40 TABLET, DELAYED RELEASE ORAL at 05:24

## 2022-12-15 RX ADMIN — ISOSORBIDE MONONITRATE 30 MG: 30 TABLET, EXTENDED RELEASE ORAL at 08:07

## 2022-12-15 RX ADMIN — ATORVASTATIN CALCIUM 40 MG: 40 TABLET, FILM COATED ORAL at 15:39

## 2022-12-15 RX ADMIN — ALBUTEROL SULFATE 2.5 MG: 2.5 SOLUTION RESPIRATORY (INHALATION) at 12:30

## 2022-12-15 RX ADMIN — HEPARIN SODIUM 5000 UNITS: 5000 INJECTION INTRAVENOUS; SUBCUTANEOUS at 21:46

## 2022-12-15 RX ADMIN — Medication 250 MG: at 08:07

## 2022-12-15 RX ADMIN — ALBUTEROL SULFATE 2.5 MG: 2.5 SOLUTION RESPIRATORY (INHALATION) at 21:50

## 2022-12-15 RX ADMIN — TORSEMIDE 20 MG: 20 TABLET ORAL at 08:07

## 2022-12-15 RX ADMIN — LIDOCAINE 5% 1 PATCH: 700 PATCH TOPICAL at 15:39

## 2022-12-15 RX ADMIN — SENNOSIDES 8.6 MG: 8.6 TABLET, FILM COATED ORAL at 21:46

## 2022-12-15 RX ADMIN — ALBUTEROL SULFATE 2.5 MG: 2.5 SOLUTION RESPIRATORY (INHALATION) at 18:45

## 2022-12-15 RX ADMIN — ALBUTEROL SULFATE 2.5 MG: 2.5 SOLUTION RESPIRATORY (INHALATION) at 16:08

## 2022-12-15 RX ADMIN — GABAPENTIN 100 MG: 100 CAPSULE ORAL at 21:46

## 2022-12-15 RX ADMIN — IOHEXOL 100 ML: 350 INJECTION, SOLUTION INTRAVENOUS at 17:18

## 2022-12-15 RX ADMIN — DOCUSATE SODIUM 100 MG: 100 CAPSULE, LIQUID FILLED ORAL at 21:46

## 2022-12-15 RX ADMIN — CEFAZOLIN SODIUM 2000 MG: 2 SOLUTION INTRAVENOUS at 21:47

## 2022-12-15 RX ADMIN — CEFAZOLIN SODIUM 2000 MG: 2 SOLUTION INTRAVENOUS at 05:24

## 2022-12-15 RX ADMIN — ALLOPURINOL 200 MG: 100 TABLET ORAL at 08:07

## 2022-12-15 RX ADMIN — CEFAZOLIN SODIUM 2000 MG: 2 SOLUTION INTRAVENOUS at 13:46

## 2022-12-15 RX ADMIN — DULOXETINE HYDROCHLORIDE 60 MG: 60 CAPSULE, DELAYED RELEASE ORAL at 08:07

## 2022-12-15 RX ADMIN — FUROSEMIDE 20 MG: 10 INJECTION, SOLUTION INTRAMUSCULAR; INTRAVENOUS at 19:56

## 2022-12-15 RX ADMIN — UMECLIDINIUM 1 PUFF: 62.5 AEROSOL, POWDER ORAL at 08:08

## 2022-12-15 RX ADMIN — METHYLPREDNISOLONE SODIUM SUCCINATE 40 MG: 40 INJECTION, POWDER, FOR SOLUTION INTRAMUSCULAR; INTRAVENOUS at 15:39

## 2022-12-15 RX ADMIN — Medication 250 MG: at 17:04

## 2022-12-15 RX ADMIN — LIDOCAINE 5% 1 PATCH: 700 PATCH TOPICAL at 08:07

## 2022-12-15 RX ADMIN — DULOXETINE HYDROCHLORIDE 60 MG: 60 CAPSULE, DELAYED RELEASE ORAL at 17:04

## 2022-12-15 NOTE — PLAN OF CARE
Problem: Potential for Falls  Goal: Patient will remain free of falls  Description: INTERVENTIONS:  - Educate patient/family on patient safety including physical limitations  - Instruct patient to call for assistance with activity   - Consult OT/PT to assist with strengthening/mobility   - Keep Call bell within reach  - Keep bed low and locked with side rails adjusted as appropriate  - Keep care items and personal belongings within reach  - Initiate and maintain comfort rounds  - Make Fall Risk Sign visible to staff  - Offer Toileting every 2 Hours, in advance of need  - Initiate/Maintain alarm  - Obtain necessary fall risk management equipment  - Apply yellow socks and bracelet for high fall risk patients  - Consider moving patient to room near nurses station  Outcome: Progressing     Problem: RESPIRATORY - ADULT  Goal: Achieves optimal ventilation and oxygenation  Description: INTERVENTIONS:  - Assess for changes in respiratory status  - Assess for changes in mentation and behavior  - Position to facilitate oxygenation and minimize respiratory effort  - Oxygen administered by appropriate delivery if ordered  - Initiate smoking cessation education as indicated  - Encourage broncho-pulmonary hygiene including cough, deep breathe, Incentive Spirometry  - Assess the need for suctioning and aspirate as needed  - Assess and instruct to report SOB or any respiratory difficulty  - Respiratory Therapy support as indicated  Outcome: Progressing     Problem: MOBILITY - ADULT  Goal: Maintain or return to baseline ADL function  Description: INTERVENTIONS:  -  Assess patient's ability to carry out ADLs; assess patient's baseline for ADL function and identify physical deficits which impact ability to perform ADLs (bathing, care of mouth/teeth, toileting, grooming, dressing, etc )  - Assess/evaluate cause of self-care deficits   - Assess range of motion  - Assess patient's mobility; develop plan if impaired  - Assess patient's need for assistive devices and provide as appropriate  - Encourage maximum independence but intervene and supervise when necessary  - Involve family in performance of ADLs  - Assess for home care needs following discharge   - Consider OT consult to assist with ADL evaluation and planning for discharge  - Provide patient education as appropriate  Outcome: Progressing  Goal: Maintains/Returns to pre admission functional level  Description: INTERVENTIONS:  - Perform BMAT or MOVE assessment daily    - Set and communicate daily mobility goal to care team and patient/family/caregiver  - Collaborate with rehabilitation services on mobility goals if consulted  - Perform Range of Motion 3 times a day  - Reposition patient every 2 hours  - Dangle patient 3 times a day  - Stand patient 3 times a day  - Ambulate patient 3 times a day  - Out of bed to chair 3 times a day   - Out of bed for meals 3 times a day  - Out of bed for toileting  - Record patient progress and toleration of activity level   Outcome: Progressing     Problem: Neurological Deficit  Goal: Neurological status is stable or improving  Description: Interventions:  - Monitor and assess patient's level of consciousness, motor function, sensory function, and level of assistance needed for ADLs  - Monitor and report changes from baseline  Collaborate with interdisciplinary team to initiate plan and implement interventions as ordered  - Provide and maintain a safe environment  - Consider seizure precautions  - Consider fall precautions  - Consider aspiration precautions  - Consider bleeding precautions    Outcome: Progressing

## 2022-12-15 NOTE — ASSESSMENT & PLAN NOTE
Patient presented with somnolence for 24 hours and generalized weakness  Reported a little cough and runny nose, denies sore throat/SOB/fever/chills  · Patient hospitalized in November 2022 for symptomatic anemia, hemoglobin 7 1 on admission with positive stool guaiac, received 1 unit of PRBC, underwent EGD and colonoscopy both showed no evidence of bleeding, patient was recommended outpatient capsule endoscopy which is still pending   Plavix discontinued on discharge, patient remained on Xarelto  · WBC 15 38, repeat 14 80, no bands, patient afebrile initially  · Lactic acid normal   Procalcitonin elevated  · Chest x-ray showed no evidence of pneumonia  · UA no evidence of infection   · COVID-19, flu RSV negative  · CT chest showed -  Numerous new pulmonary nodules, concerning for mets; Mediastinal and hilar lymphadenopathy, concerning for metastases; Small right pleural effusion with adjacent basal opacity, most likely represents atelectasis  Nodules could possibly be septic emboli  · Patient was given Zosyn in the ED, de-escalated to Rocephin  Currently on Ancef 2 g every 8 hours  · Blood cultures 2 out of 2 growing Methicillin sensitive Staphylococcus aureus  · ID input appreciated  · Echocardiogram showed EF of 60% with severe concentric hypertrophy, EF of 60% without any visible vegetations with severe attic stenosis  · Patient underwent MARY which showed EF of 55% without any large vegetation but cannot rule out small vegetation due to calcium and acoustic shadowing  · Unclear source-possible from skin translocation as patient noted to have multiple scabs  · Pacemaker removal was recommended by ID  per cardiology, pacemaker has been in place for 12 years and removal would be an extensive surgical procedure    Given multiple comorbidities cardiology recommended conservative approach and treatment with IV antibiotics  · Repeat blood cultures from 12/7/2022 with no growth   · Anticipate course of 6 weeks of antibiotics from the date of blood culture clearance      · Status post PICC line placement

## 2022-12-15 NOTE — ASSESSMENT & PLAN NOTE
On Trelegy, albuterol nebulizer p r n   At home  · Was initially on 2 L oxygen but currently remains on room air  · Substituted Trelegy with [place] and Incruse  · Continue albuterol as needed

## 2022-12-15 NOTE — ASSESSMENT & PLAN NOTE
On Trelegy, albuterol nebulizer p r n   At home  · Was initially on 2 L oxygen but currently remains on room air  · Substituted Trelegy with Siria Grade and Incruse  · Today patient noted to be wheezing on exam   Start patient on 40 mg of IV Solu-Medrol every 12 hours and place patient on scheduled nebulizer treatments

## 2022-12-15 NOTE — ASSESSMENT & PLAN NOTE
Patient presented with somnolence for 24 hours and generalized weakness  Reported a little cough and runny nose, denies sore throat/SOB/fever/chills  · Patient hospitalized in November 2022 for symptomatic anemia, hemoglobin 7 1 on admission with positive stool guaiac, received 1 unit of PRBC, underwent EGD and colonoscopy both showed no evidence of bleeding, patient was recommended outpatient capsule endoscopy which is still pending   Plavix discontinued on discharge, patient remained on Xarelto  · WBC 15 38, no bands, patient afebrile initially  · Lactic acid normal   Procalcitonin elevated  · Chest x-ray showed no evidence of pneumonia  · UA no evidence of infection   · COVID-19, flu RSV negative  · CT chest showed -  Numerous new pulmonary nodules, concerning for mets; Mediastinal and hilar lymphadenopathy, concerning for metastases; Small right pleural effusion with adjacent basal opacity, most likely represents atelectasis  Nodules could possibly be septic emboli  · Patient was given Zosyn in the ED, de-escalated to Rocephin  Currently Ancef 2 g every 8 hours  · Blood cultures 2 out of 2 growing Methicillin sensitive Staphylococcus aureus  · ID input appreciated  · Echocardiogram showed EF of 60% with severe concentric hypertrophy, EF of 60% without any visible vegetations with severe attic stenosis  · Patient underwent MARY which showed EF of 55% without any large vegetation but cannot rule out small vegetation due to calcium and acoustic shadowing  · Unclear source-possible from skin translocation as patient noted to have multiple scabs  · Pacemaker removal was recommended by ID  per cardiology, pacemaker has been in place for 12 years and removal would be an extensive surgical procedure    Given multiple comorbidities cardiology recommended conservative approach and treatment with IV antibiotics  · Repeat blood cultures from 12/7/2022 negative  · Anticipate course of 6 weeks of antibiotics from the date of blood culture clearance      · Status post PICC line placement

## 2022-12-15 NOTE — PROGRESS NOTES
NEPHROLOGY PROGRESS NOTE   Kelvin March 80 y o  male MRN: [id number]  Unit/Bed#: 2 [place] [address] Encounter: [id number]  Reason for Consult: Hyponatremia    ASSESSMENT AND PLAN:  80 y o  man with PMH of COPD, CAD status post PCI, CHF, BPH, GERD, arctic stenosis, A  fib, p/w lethargy and weakness    [facility] is consulted for management of hyponatremia      PLAN     #Hyposomolar Hyponatremia   • Sodium on admission: 133 mEq/L  • Serum osm: 285   • Urine osm: 326 (elevated) reflects presence of ADH  • Urine Sodium: 7  • Current sodium: 131mEq/L improving  • Etiology: Multifactorial likely secondary to SIADH in the settings of pulmonary nodules, possible malignancy, Cymbalta and component of fluid overload   • Rate of correction: 133>130>130>127>129>131  • Plan:  • Torsemide restarted yesterday, increased to 40 mg a day  • Restriction 1 2 L   • BMP Q 24   • Avoid overcorrection: no more than 6-8mEq in the next 24hr, goal </=135  • Please monitor UOP  • Will monitor labs, call renal if SNa+ <126 or >135  • Risk of ODS:  ? Advance age     #Volume status/blood pressure:  • Volume: Fluid overload  • Blood pressure:  Tendency to hypotension /55mmHg  • Recommend:  • Increase torsemide to 40 mg daily      #CKD G3b  • Baseline creatinine: 1 5 to 1 8 mg/dL  • Current creatinine:  1 69 mg/dL, trending down, at baseline   • Etiology: Secondary to nephrosclerosis in the settings of vascular disease  • UA: No hematuria, no leukocyturia  • Plan:  • No indication of urgent dialysis at this time  • Maintain mean arterial pressure above 65 mmHg to avoid hypotension/hypoperfusion  • Avoid nephrotoxic agents such as NSAIDs and contrast if at all possible    • Avoid opioids   • Adjust medications to GFR  • Renal diet      #Acid base disorder  • serum HCO3 30 mmol/L  • Metabolic alkalosis likely secondary to muscular contraction in the settings of diuretics     #CKD-MBD/hypercalcemia  • Calcium  10 2 mg/dL corrected for anemia  • Possible secondary to malignancy     #Secondary Hyperparathyroidism   • Will check iPTH      #Anemia:  • Current hemoglobin: 7 8 mg/dL  • Treatment:  • Transfuse for hemoglobin less than 7 0 per primary service       #AMS  • Possible metastasis  • Neurology following     #Pulmonary nodules  · Possible malignancy     #MSSA bacteremia  • On cefazolin  • Follow-up by ID    SUBJECTIVE:  Patient seen and examined at bedside  No chest pain, complaints of shortness of breath, nausea, vomiting, abdominal pain or diarrhea      OBJECTIVE:  Current Weight: Weight - Scale: 120 kg (265 lb)  Vitals:    12/15/22 0715   BP: 110/55   Pulse: 77   Resp:    Temp:    SpO2: 96%       Intake/Output Summary (Last 24 hours) at 12/15/2022 0829  Last data filed at 12/15/2022 6076  Gross per 24 hour   Intake 300 ml   Output 300 ml   Net 0 ml     Wt Readings from Last 3 Encounters:   12/07/22 120 kg (265 lb)   11/22/22 121 kg (265 lb 10 5 oz)   10/20/22 120 kg (265 lb)     Temp Readings from Last 3 Encounters:   12/14/22 97 8 °F (36 6 °C) (Oral)   11/22/22 97 6 °F (36 4 °C)   10/05/22 (!) 97 4 °F (36 3 °C)     BP Readings from Last 3 Encounters:   12/15/22 110/55   11/22/22 119/64   10/20/22 130/70     Pulse Readings from Last 3 Encounters:   12/15/22 77   11/22/22 70   10/20/22 86     General:  Obese, no acute distress at this time  Skin:  No acute rash  Eyes:  No scleral icterus and noninjected  ENT:  mucous membranes moist  Neck:  no carotid bruits  Chest: Bilateral crackles   CVS:  Regular rate and rhythm without a murmur rub , no jugular venous distention,  Abdomen:  soft and nontender   Extremities: Lower extremity edema  Neuro:  No gross focality  Psych:  Alert , cooperative       Medications:    Current Facility-Administered Medications:   •  acetaminophen (TYLENOL) tablet 650 mg, 650 mg, Oral, Q6H PRN, Liliyainohemin Latonyarik, CRNP, 650 mg at 12/08/22 1531  •  albuterol inhalation solution 2 5 mg, 2 5 mg, Nebulization, Q6H PRN, Cuiyin Yurik, CRNP, 2 5 mg at 12/12/22 1715  •  allopurinol (ZYLOPRIM) tablet 200 mg, 200 mg, Oral, Daily, Kel Arce CRNP, 200 mg at 12/15/22 0807  •  atorvastatin (LIPITOR) tablet 40 mg, 40 mg, Oral, Daily With Dinner, Kel Arce, CRNP, 40 mg at 12/14/22 1647  •  ceFAZolin (ANCEF) IVPB (premix in dextrose) 2,000 mg 50 mL, 2,000 mg, Intravenous, Q8H, Ron Ellis MD, Last Rate: 100 mL/hr at 12/15/22 0524, 2,000 mg at 12/15/22 0524  •  docusate sodium (COLACE) capsule 100 mg, 100 mg, Oral, BID, Ron Ellis MD, 100 mg at 12/15/22 0807  •  DULoxetine (CYMBALTA) delayed release capsule 60 mg, 60 mg, Oral, BID, NAVA Coleman, 60 mg at 12/15/22 0807  •  finasteride (PROSCAR) tablet 5 mg, 5 mg, Oral, Daily, Cucristal Hanksrirajendra, CRNP, 5 mg at 12/15/22 0807  •  fluticasone-vilanterol 200-25 mcg/actuation 1 puff, 1 puff, Inhalation, Daily, Kel Arce, CRNP, 1 puff at 12/15/22 0808  •  gabapentin (NEURONTIN) capsule 100 mg, 100 mg, Oral, HS, Kel Arce, CRNP, 100 mg at 12/14/22 2108  •  isosorbide mononitrate (IMDUR) 24 hr tablet 30 mg, 30 mg, Oral, Daily, Kel Arce, CRNP, 30 mg at 12/15/22 0807  •  lidocaine (LIDODERM) 5 % patch 1 patch, 1 patch, Topical, Daily, Shelby Mueller PA-C, 1 patch at 12/15/22 0807  •  methocarbamol (ROBAXIN) tablet 500 mg, 500 mg, Oral, Q6H PRN, Lynn Montano DO, 500 mg at 12/14/22 2108  •  ondansetron (ZOFRAN) injection 4 mg, 4 mg, Intravenous, Q6H PRN, NAVA Coleman, 4 mg at 12/13/22 1150  •  pantoprazole (PROTONIX) EC tablet 40 mg, 40 mg, Oral, Early Morning, NAVA Coleman, 40 mg at 12/15/22 0524  •  polyethylene glycol (MIRALAX) packet 17 g, 17 g, Oral, Daily PRN, NAVA Coleman  •  polyethylene glycol (MIRALAX) packet 17 g, 17 g, Oral, Daily, Calvin Bass MD, 17 g at 12/14/22 0857  •  saccharomyces boulardii (FLORASTOR) capsule 250 mg, 250 mg, Oral, BID, NAVA Coleman, 250 mg at 12/15/22 0807  •  senna (SENOKOT) tablet 8 6 mg, 1 tablet, Oral, HS, Ron Ellis, MD, 8 6 mg at 12/14/22 2108  •  torsemide [facility]) tablet 20 mg, 20 mg, Oral, Daily, Carrie Walker MD, 20 mg at 12/15/22 1490  •  umeclidinium 62 5 mcg/actuation inhaler AEPB 1 puff, 1 puff, Inhalation, Daily, NAVA Coleman, 1 puff at 12/15/22 0960    Laboratory Results:  Results from last 7 days   Lab Units 12/15/22  0523 12/14/22  0457 12/13/22  1559 12/13/22  0519 12/12/22  0442 12/11/22  0643 12/10/22  0611 12/09/22  0515   WBC Thousand/uL 7 55 8 98  --  11 59* 12 77* 14 68* 13 62* 11 46*   HEMOGLOBIN g/dL 7 8* 7 7*  --  8 3* 8 3* 8 3* 7 9* 7 6*   HEMATOCRIT % 25 7* 25 4*  --  27 1* 27 0* 26 5* 25 4* 24 6*   PLATELETS Thousands/uL 210 232  --  249 227 194 170 129*   SODIUM mmol/L 131* 129* 127* 128* 130* 134* 131* 133*   POTASSIUM mmol/L 4 1 4 2 4 3 4 4 4 3 4 4 4 2 4 0   CHLORIDE mmol/L 95* 91* 92* 92* 93* 97 95* 96   CO2 mmol/L 30 28 29 26 27 27 28 27   BUN mg/dL 48* 50* 54* 48* 43* 41* 38* 37*   CREATININE mg/dL 1 69* 1 88* 1 97* 1 86* 1 81* 1 74* 1 61* 1 65*   CALCIUM mg/dL 8 4 8 6 8 7 8 8 8 8 8 5 8 5 8 2*   MAGNESIUM mg/dL 2 2  --   --   --   --   --   --   --        IR tunneled central line placement   Final Result by Elliott Jaimes MD (12/13 6286)      Insertion of right-sided dual-lumen tunneled small bore central venous catheter, with tip in the expected location of the cavoatrial junction  Plan:       The catheter may be used immediately  Workstation performed: [id number]         CT spine cervical wo contrast   Final Result by Bradley Piña MD (12/12 1600)   1  The cervical vertebral body heights are maintained demonstrating no acute fracture or subluxation  2  Multilevel cervical degenerative disc disease similar to 11/13/2018  3  New focal nodular lesion within the anterior right lung apex  Please refer to the CT chest examination of 12/6/2022 for further details  Small bilateral pleural effusions, new on the left        Workstation performed: [id number]         CT head wo contrast   Final Result by She Soto MD (12/12 1541)      1  Persistent small patchy subcortical white matter hypodensities involving the high right frontoparietal, right lateral frontal and right posterior parietal regions concerning for vasogenic edema related to metastatic foci  MRI of the brain with    contrast is recommended for further evaluation  2   Mild chronic microtraumatic ischemic changes  The study was marked in EPIC for significant notification  Workstation performed: [id number]         XR abdomen obstruction series   Final Result by Erin Junior MD (12/12 2053)      No evidence of small bowel obstruction or free air   Moderate amount of colonic stool         Workstation performed: [id number]         CT head without contrast   Final Result by Lennox Ferns, MD (12/06 3067)   Addendum (preliminary) 1 of 1 by Lennox Ferns, MD (12/06 0409)   ADDENDUM:      In light of the concurrent chest CT findings, the right parieto-occipital    abnormality may also represent a metastasis      Final      Hypodensity in the right parieto-occipital region most likely represents a chronic infarct, however no prior study is available to confirm this      The study was marked in EPIC for immediate notification  Workstation performed: [id number]         CT chest without contrast   Final Result by Lennox Ferns, MD (12/06 0404)      1  Numerous new pulmonary nodules, concerning for metastatic disease   2  Mediastinal and hilar lymphadenopathy, also concerning for metastases   3  Small right pleural effusion               Workstation performed: [id number]         XR chest 1 view portable   Final Result by Jacki Marte MD (12/06 1025)      Prominent pulmonary vasculature and interstitial markings suggesting moderate congestive changes, stable                    Workstation performed: [id number]             Portions of the record may have been created with voice recognition software  Occasional wrong word or "sound a like" substitutions may have occurred due to the inherent limitations of voice recognition software  Read the chart carefully and recognize, using context, where substitutions have occurred

## 2022-12-15 NOTE — ASSESSMENT & PLAN NOTE
Likely acute toxic metabolic encephalopathy due to underlying infection, possible mets  Reported feeling sleepy and weak    · CT head - Hypodensity in the right parieto-occipital region which could be chronic infarct versus metastatic disease based on the CT of the chest  · Mental status has improved significantly with treatment of sepsis  · ABG no hypercapnia  · Patient was initially worked up for possible stroke  · Neurology input appreciated  · Continue Xarelto 15 mg p o  daily and Lipitor  · Patient could not get MRI of the brain due to incompatible pacemaker  · Was given full dose aspirin in the ED along with Lipitor 80 mg  · Hemoglobin A1c was 5 1  · Repeat CT head 12/12 which shows persistent small patchy hypodensities concerning for vasogenic edema related to metastatic foci  · PT/OT evaluation and treatment

## 2022-12-15 NOTE — PROGRESS NOTES
Mandy 45  Progress Note [place] 1940, 80 y o  male MRN: [id number]  Unit/Bed#: 2 [place] [address] Encounter: [id number]  Primary Care Provider: Arlet Zaragoza DO   Date and time admitted to hospital: 12/5/2022  6:39 PM    * MSSA bacteremia  Assessment & Plan  Patient presented with somnolence for 24 hours and generalized weakness  Reported a little cough and runny nose, denies sore throat/SOB/fever/chills  · Patient hospitalized in November 2022 for symptomatic anemia, hemoglobin 7 1 on admission with positive stool guaiac, received 1 unit of PRBC, underwent EGD and colonoscopy both showed no evidence of bleeding, patient was recommended outpatient capsule endoscopy which is still pending   Plavix discontinued on discharge, patient remained on Xarelto  · WBC 15 38, no bands, patient afebrile initially  · Lactic acid normal   Procalcitonin elevated  · Chest x-ray showed no evidence of pneumonia  · UA no evidence of infection   · COVID-19, flu RSV negative  · CT chest showed -  Numerous new pulmonary nodules, concerning for mets; Mediastinal and hilar lymphadenopathy, concerning for metastases; Small right pleural effusion with adjacent basal opacity, most likely represents atelectasis  Nodules could possibly be septic emboli  · Patient was given Zosyn in the ED, de-escalated to Rocephin  Currently Ancef 2 g every 8 hours  · Blood cultures 2 out of 2 growing Methicillin sensitive Staphylococcus aureus     · ID input appreciated  · Echocardiogram showed EF of 60% with severe concentric hypertrophy, EF of 60% without any visible vegetations with severe attic stenosis  · Patient underwent MARY which showed EF of 55% without any large vegetation but cannot rule out small vegetation due to calcium and acoustic shadowing  · Unclear source-possible from skin translocation as patient noted to have multiple scabs  · Pacemaker removal was recommended by ID  per cardiology, pacemaker has been in place for 12 years and removal would be an extensive surgical procedure  Given multiple comorbidities cardiology recommended conservative approach and treatment with IV antibiotics  · Repeat blood cultures from 12/7/2022 negative  · Anticipate course of 6 weeks of antibiotics from the date of blood culture clearance  · Status post PICC line placement    Acute encephalopathy  Assessment & Plan  Likely acute toxic metabolic encephalopathy due to underlying infection, possible mets  Reported feeling sleepy and weak  · CT head - Hypodensity in the right parieto-occipital region which could be chronic infarct versus metastatic disease based on the CT of the chest  · Mental status has improved significantly with treatment of sepsis  · ABG no hypercapnia  · Patient was initially worked up for possible stroke  · Patient could not get MRI of the brain due to incompatible pacemaker  · Was given full dose aspirin in the ED along with Lipitor 80 mg  · Hemoglobin A1c was 5 1  · Repeat CT head 12/12 which shows persistent small patchy hypodensities concerning for vasogenic edema related to metastatic foci  · PT/OT evaluation and treatment    Chronic diastolic heart failure ([address] )  Assessment & Plan  Wt Readings from Last 3 Encounters:   12/07/22 120 kg (265 lb)   11/22/22 121 kg (265 lb 10 5 oz)   10/20/22 120 kg (265 lb)     On Demadex 40 mg p o  Daily at home  · 2D echo in January 2022 showed normal EF, 65%, moderate concentric hypertrophy, dilated atriums, severe AS, mild to moderate AR, moderate MS  · Chest X-ray showed cardiomegaly with moderate congestive changes  · BNP was elevated at 14,000  · Demadex was held initially in the setting of sepsis and then later in the setting of worsening creatinine  Also received dose of Lasix on 12/8 and 12/9  · Restarted on 20 mg of Demadex daily on 12/13    Demadex discontinued as patient will be getting CT scan with dye    Abnormal CAT scan  Assessment & Plan  CT chest - numerous new pulmonary nodules concerning for metastatic disease along with mediastinal and hilar lymphadenopathy and small right pleural effusion  · Patient had CT abdomen pelvis without contrast on 11/19/2022 which was unremarkable  · CT of the brain also showing hypodensity in the right parieto-occipital region which might represent chronic infarct/metastasis  Repeat CT head on 12/12 showed persistent hypodensities concerning for metastatic foci  · Discussed with neurology who is concerned for metastatic lesion on CT brain  Oncology was consulted  · Per radiation oncology, will get CT with IV contrast for further evaluation  · Patient did have a colonoscopy during prior hospitalization which showed no masses  · No history of malignancy as per family  · Pulmonary recommended IR guided lung biopsy  Lung nodules could also be related to septic emboli  Per ID, will need repeat CT scan in 3 months  If the nodules are improving patient might not need further work-up but if the nodules are persistent might need biopsy      Elevated troponin  Assessment & Plan  Mild troponin elevation most likely non MI troponin elevation secondary to sepsis  · EKG showed paced rhythm Per prior provider  · Patient also complained of some chest pain on 12/8/22 and repeat troponin remains around 700  · Repeat EKG showed some inferolateral T wave inversion per prior provider  · Suspected secondary to symptomatic anemia  Symptoms improved after 1 unit of PRBC transfusion      Hyponatremia  Assessment & Plan  Results from last 7 days   Lab Units 12/15/22  0523 12/14/22  0457 12/13/22  1559 12/13/22  0519 12/12/22  0442 12/11/22  0643 12/10/22  0611 12/09/22  0515   SODIUM mmol/L 131* 129* 127* 128* 130* 134* 131* 133*     In the setting of volume overload  · Demadex was held in the setting of worsening creatinine but since restarted on 12/13  · Sodium level slowly improving    Nephrology input appreciated  · Continue on 1200 mL fluid restriction    COPD (chronic obstructive pulmonary disease) ([facility])  Assessment & Plan  On Trelegy, albuterol nebulizer p r n  At home  · Was initially on 2 L oxygen but currently remains on room air  · Substituted Trelegy with Phoebe Mad and Incruse  · Today patient noted to be wheezing on exam   Start patient on 40 mg of IV Solu-Medrol every 12 hours and place patient on scheduled nebulizer treatments      Depression with anxiety  Assessment & Plan  Continue Cymbalta    Anemia  Assessment & Plan  Baseline hemoglobin appears to be around 7-9s  · Hemoglobin 9 1, repeat 8 1 in ED  No evidence of active bleeding  · Received 1 unit PRBC transfusion during this admission  · Stool for occult blood was negative x1  · Hemoglobin overall stable    Results from last 7 days   Lab Units 12/15/22  0523 12/14/22  0457 12/13/22  0519 12/12/22  0442 12/11/22  0643 12/10/22  0611 12/09/22  0515   HEMOGLOBIN g/dL 7 8* 7 7* 8 3* 8 3* 8 3* 7 9* 7 6*   HEMATOCRIT % 25 7* 25 4* 27 1* 27 0* 26 5* 25 4* 24 6*   MCV fL 87 88 87 87 85 86 87         Moderate to severe aortic stenosis  Assessment & Plan  Severe AS on echo in 1/2022  · Underwent MARY showed mild to moderate concentric hypertrophy with EF of 55% with moderately thickened aortic valve with severe aortic valve stenosis    Gastro-esophageal reflux disease without esophagitis  Assessment & Plan  Continue Protonix    Obesity (BMI 30-39  9)  Assessment & Plan  Body mass index is 34 02 kg/m²  · Diet and lifestyle modification    CAD (coronary artery disease)  Assessment & Plan  Status post PCI with stents x 2   · Continue statin, Imdur    · Plavix was discontinued during prior admission      Stage 3a chronic kidney disease [facility])  Assessment & Plan  Lab Results   Component Value Date    EGFR 37 12/15/2022    EGFR 32 12/14/2022    EGFR 30 12/13/2022    CREATININE 1 69 (H) 12/15/2022    CREATININE 1 88 (H) 12/14/2022    CREATININE 1 97 (H) 12/13/2022     Baseline creatinine appears to be around 1 3-1 6s  · Creatinine continues to remain at baseline  · Repeat lab work in AM       JOVI (obstructive sleep apnea)  Assessment & Plan  Continue CPAP at HS  Hyperlipidemia  Assessment & Plan  Continue statin    Chronic a-fib [facility])  Assessment & Plan  Status post pacemaker insertion  · Holding Xarelto in case patient needs biopsy of lung nodules  · Start heparin for DVT prophylaxis for now      Benign prostatic hyperplasia  Assessment & Plan  Continue Proscar    Benign hypertension with chronic kidney disease, stage III (HCC)  Assessment & Plan  · Holding Demadex      VTE Pharmacologic Prophylaxis:   Heparin    Patient Centered Rounds: I performed bedside rounds with nursing staff today  Discussions with Specialists or Other Care Team Provider: yes - neuro    Education and Discussions with Family / Patient: Updated  (wife) via phone  Time Spent for Care: 40 min  More than 50% of total time spent on counseling and coordination of care as described above  Current Length of Stay: 9 day(s)  Current Patient Status: Inpatient   Certification Statement: The patient will continue to require additional inpatient hospital stay due to Hyponatremia, possible metastatic lesions in the brain, COPD  Discharge Plan: Anticipate discharge in 48-72 hrs to rehab facility  Code Status: Level 1 - Full Code    Subjective:     Patient reports some difficulty swallowing  Reports being short of breath    Objective:     Vitals:   Temp (24hrs), Av 9 °F (36 6 °C), Min:97 6 °F (36 4 °C), Max:98 1 °F (36 7 °C)    Temp:  [97 6 °F (36 4 °C)-98 1 °F (36 7 °C)] 97 9 °F (36 6 °C)  HR:  [70-80] 73  Resp:  [18-20] 19  BP: (110-137)/(55-69) 132/68  SpO2:  [93 %-99 %] 98 %  Body mass index is 34 02 kg/m²  Input and Output Summary (last 24 hours):      Intake/Output Summary (Last 24 hours) at 12/15/2022 1836  Last data filed at 12/15/2022 1001  Gross per 24 hour   Intake 300 ml   Output 600 ml   Net -300 ml Physical Exam:   Physical Exam  Vitals reviewed  Constitutional:       General: He is not in acute distress  Appearance: He is ill-appearing  HENT:      Head: Normocephalic and atraumatic  Eyes:      General:         Right eye: No discharge  Left eye: No discharge  Cardiovascular:      Rate and Rhythm: Normal rate and regular rhythm  Heart sounds: Murmur heard  Pulmonary:      Effort: Pulmonary effort is normal  No respiratory distress  Breath sounds: Wheezing present  No rales  Comments: Decreased breath sounds bilaterally  Abdominal:      General: Bowel sounds are normal  There is no distension  Palpations: Abdomen is soft  Tenderness: There is no abdominal tenderness  Musculoskeletal:      Right lower leg: Edema present  Left lower leg: Edema present  Neurological:      Mental Status: He is alert  Additional Data:     Labs:  Results from last 7 days   Lab Units 12/15/22  0523 12/13/22  0519 12/12/22  0442 12/11/22  0643 12/10/22  0611   WBC Thousand/uL 7 55   < > 12 77*   < > 13 62*   HEMOGLOBIN g/dL 7 8*   < > 8 3*   < > 7 9*   HEMATOCRIT % 25 7*   < > 27 0*   < > 25 4*   PLATELETS Thousands/uL 210   < > 227   < > 170   BANDS PCT %  --   --  4  --   --    NEUTROS PCT %  --   --   --   --  85*   LYMPHS PCT %  --   --   --   --  6*   LYMPHO PCT %  --   --  7*  --   --    MONOS PCT %  --   --   --   --  7   MONO PCT %  --   --  5  --   --    EOS PCT %  --   --  0  --  0    < > = values in this interval not displayed       Results from last 7 days   Lab Units 12/15/22  0523   SODIUM mmol/L 131*   POTASSIUM mmol/L 4 1   CHLORIDE mmol/L 95*   CO2 mmol/L 30   BUN mg/dL 48*   CREATININE mg/dL 1 69*   ANION GAP mmol/L 6   CALCIUM mg/dL 8 4   ALBUMIN g/dL 1 7*   TOTAL BILIRUBIN mg/dL 0 54   ALK PHOS U/L 118*   ALT U/L 9*   AST U/L 46*   GLUCOSE RANDOM mg/dL 112         Results from last 7 days   Lab Units 12/15/22  1817   POC GLUCOSE mg/dl 205* Lines/Drains:  Invasive Devices     Central Venous Catheter Line  Duration           CVC Central Lines 12/12/22 Single Right  3 days                Central Line:  Goal for removal: N/A - Discharging with PICC for IV ABX/medications             Imaging: Reviewed radiology reports from this admission including: CT head    Recent Cultures (last 7 days):         Last 24 Hours Medication List:   Current Facility-Administered Medications   Medication Dose Route Frequency Provider Last Rate   • acetaminophen  650 mg Oral Q6H PRN NAVA Coleman     • albuterol  2 5 mg Nebulization 4x Daily Lynn Revjonesar, DO     • albuterol  2 5 mg Nebulization Q4H PRN [facility] Revjonesar, DO     • allopurinol  200 mg Oral Daily NAVA Coleman     • atorvastatin  40 mg Oral Daily With NAVA French     • cefazolin  2,000 mg Intravenous Q8H Valente Oconnell MD 2,000 mg (12/15/22 1346)   • docusate sodium  100 mg Oral BID Ron Ellis MD     • DULoxetine  60 mg Oral BID NAVA Coleman     • finasteride  5 mg Oral Daily NAVA Coleman     • fluticasone-vilanterol  1 puff Inhalation Daily NAVA Cloeman     • gabapentin  100 mg Oral HS NAVA Coleman     • heparin (porcine)  5,000 Units Subcutaneous Q8H [facility]     • isosorbide mononitrate  30 mg Oral Daily NAVA Coleman     • lidocaine  1 patch Topical Daily Shelby Mueller PA-C     • lidocaine  1 patch Topical Once [facility] Revjonesar, DO     • methocarbamol  500 mg Oral Q6H PRN [facility] Revjonesar, DO     • methylPREDNISolone sodium succinate  40 mg Intravenous Q12H [facility]     • ondansetron  4 mg Intravenous Q6H PRN NAVA Coleman     • pantoprazole  40 mg Oral Early Morning NAVA Coleman     • polyethylene glycol  17 g Oral Daily PRN NAVA Coleman     • polyethylene glycol  17 g Oral Daily Ron Ellis MD     • saccharomyces boulardii  250 mg Oral BID Cuiyin Yurik, CRNP     • senna  1 tablet Oral HS Ariel Yates MD     • umeclidinium  1 puff Inhalation Daily NAVA Coleman          Today, Patient Was Seen By: Ravin Hdez DO    **Please Note: This note may have been constructed using a voice recognition system  **

## 2022-12-15 NOTE — ASSESSMENT & PLAN NOTE
Lab Results   Component Value Date    EGFR 37 12/15/2022    EGFR 32 12/14/2022    EGFR 30 12/13/2022    CREATININE 1 69 (H) 12/15/2022    CREATININE 1 88 (H) 12/14/2022    CREATININE 1 97 (H) 12/13/2022     Baseline creatinine appears to be around 1 3-1 6s    · Creatinine continues to remain at baseline  · Repeat lab work in AM

## 2022-12-15 NOTE — ASSESSMENT & PLAN NOTE
Severe AS on echo in 1/2022  · Underwent MARY showed mild to moderate concentric hypertrophy with EF of 55% with moderately thickened aortic valve with severe aortic valve stenosis

## 2022-12-15 NOTE — ASSESSMENT & PLAN NOTE
Baseline hemoglobin appears to be around 7-9s  · Hemoglobin 9 1, repeat 8 1 in ED  No evidence of active bleeding     · Received 1 unit PRBC transfusion during this admission  · Stool for occult blood was negative x1  · Hemoglobin overall stable    Results from last 7 days   Lab Units 12/14/22  0457 12/13/22  0519 12/12/22  0442 12/11/22  0643 12/10/22  0611 12/09/22  0515 12/08/22  0614   HEMOGLOBIN g/dL 7 7* 8 3* 8 3* 8 3* 7 9* 7 6* 7 3*   HEMATOCRIT % 25 4* 27 1* 27 0* 26 5* 25 4* 24 6* 23 7*   MCV fL 88 87 87 85 86 87 86

## 2022-12-15 NOTE — ASSESSMENT & PLAN NOTE
Status post PCI with stents x 2   · Continue statin, Imdur    · Plavix was discontinued during prior admission

## 2022-12-15 NOTE — PROGRESS NOTES
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  3   MSSA Bacteremia  5   Elevated troponins  6   Metabolic Encephalopathy     - Monitored on telemetry  - Fall risk  - Neurological assessments  -Xarelto 15 mg daily  -Zocor 20 mg daily  - Unable to get MRI of the brain with and without contrast secondary to incompatible pacemaker  -Repeat CTH done 12/12/2022, noted persistent patchy subcortical white matter hypodensity c/w vasogenic edema r/t metastatic foci  -CT Head with contrast   -Per Nephrology, recommendations to hold diuretic x 24hrs   - PT/OT/ST  - Pt with PMH of lung Ca, with evidence of metastatic disease in the brain   -Oncology recommendations for hold on biopsy until pt completes his 6 weeks of abx therapies  -ID recommend 6 weeks of IV abx, follow up CT of Chest in 3 months, if nodules remain, recommended biopsy at that time    -Sent message out to the [address] [place] [address] [place] in Network, noted that there would be difficulty in characterizing lesions without MRI, recommended CTH with contrast at the most   Also recommended d/w NS team re: their opinions and recommendations  Pt NS, they would prefer enhanced study if able to see tissue better for interpretation  Recommend CTH w/contrast as well         Patient is an 80-year-old male with PMH of CKD, CAD, atrial flutter with pacemaker, sleep apnea and lung CA who has had recent onset of generalized weakness and somnolence   Patient reported being fatigued and somnolent x24 hours   Patient was brought to the ER, he presented with elevated creatinine and BUN   Also has higher troponins from last admission at 325 and positive leukocytosis with anemia hemoglobin 9 1     Patient has recent admission, discharged on 11/22/2022 after having GI bleed  Mikki Seen had been on Xarelto and Plavix prior to that hospital stay   His Plavix was discontinued at that time  Patient has no focal deficits on exam today, he has equal strength in the upper extremities   He has equal lower extremity strength with 4/5 on the hip, remaining lower extremity muscle groups 4+/5   Patient has equal sensation and reflexes   He is able to stand and bear weight with the assistance of a rolling walker   He does get very short of breath and desaturates with activity and movement   Cranial nerves II through XII intact with exception to left eye strabismus   Left eye does leg with EOM  Patient had a CTh showing hypodensity in the right parietal-occipital lobe, initially suspected possible chronic infarct however with history of lung CA clarification indicating the possibility that this is metastasis   Patient was recommended MRI of the brain with and without contrast for further characterization however unable to obtain due to patient's pacemaker being incompatible  A repeat CTH was completed a week later, noted ongoing findings of consistent with vasogenic edema related to a metastatic foci  Again recommended MRI, however, pt is unable to have MRI due to pacer incompatibility  Medical team has been working on bacteremia, kidneys, electrolytes etc  Per Oncology and ID recommendation, continue with long term Abx for poss sepsis etiology and follow up with CT of Chest in 3 months to eval for further presence  If continues to be present, recommended biopsy at that time  Sent message out to Rad  Onc in the network to review film and make recommendations going forward  Would be best eval by MRI w/wo unfortunately pt pacer is incompatible  Pt has had CKD with elevated Creat, has been decreasing over time and is currently at 1 67 and GFR of 37  Recommended follow up [address] with contrast and recommended reaching out to NS as well for recommendation from their part as well  Per NS, concur with recommendations for [address] w/contrast if GFR tolerates      [address] ordered, d/w Nephrology who recommended holding diuretic x 24hr for the testing  Recommendations for outpatient neurological follow up have yet to be determined  Chief Complaint:    Subjective:   Pt asking when he can go home  Pt appears tired and having some intermittent confusion today  Past Medical History:   Diagnosis Date   • Arthritis    • Atrial flutter (HCC)    • Chronic kidney disease     stage 3   • Coronary artery disease     2 stents   • Fluid retention    • Gout    • Heart disease    • Heart failure (HCC)     pacemaker   • Hypertension    • Hyponatremia 04/08/2019   • Neurological disorder    • Pacemaker    • Pulmonary emphysema (HCC)    • Radiculopathy     last assessed 1/28/16    • Shortness of breath     exertion   • Sleep apnea     c pap     Social History     Socioeconomic History   • Marital status: /Civil Union     Spouse name: Not on file   • Number of children: Not on file   • Years of education: Not on file   • Highest education level: Not on file   Occupational History   • Occupation: RETIRED   Tobacco Use   • Smoking status: Every Day     Packs/day: 0 25     Years: 50 00     Pack years: 12 50     Types: Cigarettes     Start date: 1/1/2022   • Smokeless tobacco: Former   • Tobacco comments:     quit 02/14/2021   Vaping Use   • Vaping Use: Never used   Substance and Sexual Activity   • Alcohol use: Never   • Drug use: No   • Sexual activity: Yes     Partners: Female     Birth control/protection: None   Other Topics Concern   • Not on file   Social History Narrative    Daily tea consumption 10 cups day      Social Determinants of Health     Financial Resource Strain: Not on file   Food Insecurity: No Food Insecurity   • Worried About Running Out of Food in the Last Year: Never true   • Ran Out of Food in the Last Year: Never true   Transportation Needs: No Transportation Needs   • Lack of Transportation (Medical): No   • Lack of Transportation (Non-Medical):  No   Physical Activity: Not on file   Stress: Not on file   Social Connections: Not on file   Intimate Partner Violence: Not on file   Housing Stability: Low Risk    • Unable to Pay for Housing in the Last Year: No   • Number of Places Lived in the Last Year: 1   • Unstable Housing in the Last Year: No     Family History   Problem Relation Age of Onset   • Cancer Mother 80        Cancer when 80 yrs old   • Heart disease Mother    • Hypertension Mother    • Heart disease Father    • Cancer Father         Heart   46   • Diabetes Neg Hx    • Stroke Neg Hx        ROS:  Please see HPI for positive symptoms  No fever, no chills, no weight change  +fatigued  Ocular: No diplopia, no blurred vision, spot/zigzag lines   HEENT:  No sore throat, headache or congestion  No neck pain  COR:  No chest pain  No palpitations  Lungs:  no sob  GI:  no  nausea, no vomiting, no diarrhea, no constipation, no anorexia  :  No dysuria, frequency, or urgency  No hematuria  Musculoskeletal:  No joint pain or swelling   Skin:  No rash or itching  Psychiatric:  no anxiety, no depression  Endocrine:  No polyuria or polydipsia  Objective:  /55   Pulse 77   Temp 98 1 °F (36 7 °C)   Resp 18   Ht 6' 2" (1 88 m)   Wt 120 kg (265 lb)   SpO2 96%   BMI 34 02 kg/m²     General: lethargic today   Mental status: oriented x3  Attention: normal  Knowledge: fair  Language and Speech: normal  Cranial nerves:   CN II: Visual fields are full to confrontation  Fundoscopic exam is normal with sharp discs and no vascular changes  Pupils are 3 mm and briskly reactive to light  CN III, IV, VI: At primary gaze, there is no eye deviation  CN V: Facial sensation is intact in all 3 divisions bilaterally  Corneal responses are intact  CN VII: Face is symmetrical, with normal eye closure and smile  CN VIII: Hearing is normal to rubbing fingers  CN IX, X: Palate elevates symmetrically   Phonation is normal   CN XI: Head turning and shoulder shrug are intact  CN XII: Tongue is midline with normal movements and no atrophy  Motor: There is no pronator drift of out-stretched arms  Muscle bulk and tone are normal     No changes noted it pt strength or exam  Muscle exam  Arm Right Left Leg Right Left   Deltoid 5/5 5/5 Iliopsoas 4/5 4/5   Biceps 5/5 5/5 Quads 4/5 4/5   Triceps 5/5 5/5 Hamstrings 4/5 4/5   Wrist Extension 5/5 5/5 Ankle Dorsi Flexion 4+/5 4+/5   Wrist Flexion 5/5 5/5 Ankle Plantar Flexion 4+/5 4+/5         Sensory: normal to light touch, temperature, vibration  Proprioception intact  Gait: deferred d/t safety  Coordination: deferred today      Reflexes    RJ BJ TJ KJ AJ Plantars Ledezma's   Right 2+ 2+ 2+ 2+ 2+ Downgoing Not present   Left 2+ 2+ 2+ 2+ 2+ Downgoing Not present      Heart: Regular rate and rhythm  Lung: audible crackling and wheezing noted  Abd: soft, non-tender, non-distended   Skin: dry and intact, color gray/ashen  + generalized edema      Labs:      Lab Results   Component Value Date    WBC 7 55 12/15/2022    HGB 7 8 (L) 12/15/2022    HCT 25 7 (L) 12/15/2022    MCV 87 12/15/2022     12/15/2022     Lab Results   Component Value Date    HGBA1C 5 1 12/06/2022     Lab Results   Component Value Date    ALT 9 (L) 12/15/2022    AST 46 (H) 12/15/2022    ALKPHOS 118 (H) 12/15/2022    BILITOT 0 7 12/11/2017     Lab Results   Component Value Date    GLUCOSE 129 (H) 12/11/2017    CALCIUM 8 4 12/15/2022     12/11/2017    K 4 1 12/15/2022    CO2 30 12/15/2022    CL 95 (L) 12/15/2022    BUN 48 (H) 12/15/2022    CREATININE 1 69 (H) 12/15/2022         Review of reports and notes reveal:   Independent review of films/reports:  XR abdomen obstruction series    Result Date: 12/12/2022  No evidence of small bowel obstruction or free air Moderate amount of colonic stool Workstation performed: [id number]     CT head wo contrast    Result Date: 12/12/2022  1    Persistent small patchy subcortical white matter hypodensities involving the high right frontoparietal, right lateral frontal and right posterior parietal regions concerning for vasogenic edema related to metastatic foci  MRI of the brain with contrast is recommended for further evaluation  2   Mild chronic microtraumatic ischemic changes  The study was marked in EPIC for significant notification  Workstation performed: [id number]     CT spine cervical wo contrast    Result Date: 12/12/2022  1  The cervical vertebral body heights are maintained demonstrating no acute fracture or subluxation  2  Multilevel cervical degenerative disc disease similar to 11/13/2018  3  New focal nodular lesion within the anterior right lung apex  Please refer to the CT chest examination of 12/6/2022 for further details  Small bilateral pleural effusions, new on the left  Workstation performed: [id number]     IR tunneled central line placement    Result Date: 12/13/2022  Insertion of right-sided dual-lumen tunneled small bore central venous catheter, with tip in the expected location of the cavoatrial junction  Plan: The catheter may be used immediately  Workstation performed: [id number]         Thank you for this consult  Total time of encounter:  40 min  More than 50% of the time was used in counseling and/or coordination of care  Extent of couseling and/or coordination of care with the pt and his daughter at bedside  D/W diagnostic findings and continued needs for further testing re:poss malignant disease  D/W medical team and reviewed findings and plans with Onc  Radiologist for further recommendations going forward    D/W attending Neurology MD

## 2022-12-15 NOTE — ASSESSMENT & PLAN NOTE
Mild troponin elevation most likely non MI troponin elevation secondary to sepsis  · EKG showed paced rhythm Per prior provider  · Patient also complained of some chest pain on 12/8/22 and repeat troponin remains around 700  · Repeat EKG showed some inferolateral T wave inversion per prior provider  · Suspected secondary to symptomatic anemia    Symptoms improved after 1 unit of PRBC transfusion

## 2022-12-15 NOTE — ASSESSMENT & PLAN NOTE
Lab Results   Component Value Date    EGFR 32 12/14/2022    EGFR 30 12/13/2022    EGFR 32 12/13/2022    CREATININE 1 88 (H) 12/14/2022    CREATININE 1 97 (H) 12/13/2022    CREATININE 1 86 (H) 12/13/2022     Baseline creatinine appears to be around 1 3-1 6s    · Creatinine continues to remain stable around baseline  · Repeat lab work in AM   Monitor while on diuretic

## 2022-12-15 NOTE — CASE MANAGEMENT
Case Management Progress Note    Patient name Walker García  Location 18 [facility] 221/2 Leslie Lugo MRN [id number]  : 1940 Date 12/15/2022       LOS (days): 9  Geometric Mean LOS (GMLOS) (days): 5 00  Days to GMLOS:-3 9        OBJECTIVE:     Current admission status: Inpatient  Preferred Pharmacy:   [facility] [address] [facility] [address] [facility] [address] [facility] [address] [place] [address],  [id number] [id number]  Phone: [phone] Fax: [phone]    [facility] [address] [facility], [address] [facility] [address]  [place] [address]  [address] [place] [address]  Phone: [phone] Fax: [place], [address]  [address] [facility]  [address] [facility] [address]  Phone: [phone] Fax: [phone]    [facility] [address] [address] [address] [address]  Phone: [phone] Fax: [phone]    Primary Care Provider: Cherelle Gallego DO    Primary Insurance: MEDICARE  Secondary Insurance: COMMERCIAL MISCELLANEOUS    PROGRESS NOTE:    SW continues to follow for discharge planning needs  DCP at this time continues for admission to [facility][1] for STR/IVABX  Per Attending, Hyponatremia is improving  Discharge is pending determination on treatment plan for pulmonary nodule/brain lesion  [facility][1] updated on current plan of care via [facility]   They continue to follow referral

## 2022-12-15 NOTE — ASSESSMENT & PLAN NOTE
CT chest - numerous new pulmonary nodules concerning for metastatic disease along with mediastinal and hilar lymphadenopathy and small right pleural effusion  · Patient had CT abdomen pelvis without contrast on 11/19/2022 which was unremarkable  · CT of the brain also showing hypodensity in the right parieto-occipital region which might represent chronic infarct/metastasis  Repeat CT head on 12/12 showed persistent hypodensities concerning for metastatic foci  · Discussed with neurology who is concerned for metastatic lesion on CT brain  Oncology was consulted  · Per radiation oncology, will get CT with IV contrast for further evaluation  · Patient did have a colonoscopy during prior hospitalization which showed no masses  · No history of malignancy as per family  · Pulmonary recommended IR guided lung biopsy  Lung nodules could also be related to septic emboli  Per ID, will need repeat CT scan in 3 months    If the nodules are improving patient might not need further work-up but if the nodules are persistent might need biopsy

## 2022-12-15 NOTE — ASSESSMENT & PLAN NOTE
Baseline hemoglobin appears to be around 7-9s  · Hemoglobin 9 1, repeat 8 1 in ED  No evidence of active bleeding     · Received 1 unit PRBC transfusion during this admission  · Stool for occult blood was negative x1  · Hemoglobin overall stable    Results from last 7 days   Lab Units 12/15/22  0523 12/14/22  0457 12/13/22  0519 12/12/22  0442 12/11/22  0643 12/10/22  0611 12/09/22  0515   HEMOGLOBIN g/dL 7 8* 7 7* 8 3* 8 3* 8 3* 7 9* 7 6*   HEMATOCRIT % 25 7* 25 4* 27 1* 27 0* 26 5* 25 4* 24 6*   MCV fL 87 88 87 87 85 86 87

## 2022-12-15 NOTE — ASSESSMENT & PLAN NOTE
Likely acute toxic metabolic encephalopathy due to underlying infection, possible mets  Reported feeling sleepy and weak    · CT head - Hypodensity in the right parieto-occipital region which could be chronic infarct versus metastatic disease based on the CT of the chest  · Mental status has improved significantly with treatment of sepsis  · ABG no hypercapnia  · Patient was initially worked up for possible stroke  · Patient could not get MRI of the brain due to incompatible pacemaker  · Was given full dose aspirin in the ED along with Lipitor 80 mg  · Hemoglobin A1c was 5 1  · Repeat CT head 12/12 which shows persistent small patchy hypodensities concerning for vasogenic edema related to metastatic foci  · PT/OT evaluation and treatment

## 2022-12-15 NOTE — ASSESSMENT & PLAN NOTE
Status post pacemaker insertion     · Holding Xarelto in case patient needs biopsy of lung nodules  · Start heparin for DVT prophylaxis for now

## 2022-12-15 NOTE — ASSESSMENT & PLAN NOTE
Wt Readings from Last 3 Encounters:   12/07/22 120 kg (265 lb)   11/22/22 121 kg (265 lb 10 5 oz)   10/20/22 120 kg (265 lb)     On Demadex 40 mg p o  Daily at home  · 2D echo in January 2022 showed normal EF, 65%, moderate concentric hypertrophy, dilated atriums, severe AS, mild to moderate AR, moderate MS  · Chest X-ray showed cardiomegaly with moderate congestive changes  · BNP was elevated at 14,000  · Demadex was held initially in the setting of sepsis and then later in the setting of worsening creatinine  Also received dose of Lasix on 12/8 and 12/9  · Restarted on 20 mg of Demadex daily on 12/13    Demadex discontinued as patient will be getting CT scan with dye

## 2022-12-15 NOTE — ASSESSMENT & PLAN NOTE
CT chest - numerous new pulmonary nodules concerning for metastatic disease along with mediastinal and hilar lymphadenopathy and small right pleural effusion  · Patient had CT abdomen pelvis without contrast on 11/19/2022 which was unremarkable  · CT of the brain also showing hypodensity in the right parieto-occipital region which might represent chronic infarct/metastasis  Repeat CT as noted above  · Discussed with neurology who is concerned for metastatic lesion on CT brain  Oncology was consulted  Neurology to discuss findings with radiation oncology tomorrow for further recommendations  · Patient did have a colonoscopy during prior hospitalization which showed no masses  · No history of malignancy as per family  · Pulmonary recommended IR guided lung biopsy  Lung nodules could also be related to septic emboli  Per ID, will need repeat CT scan in 3 months    If the nodules are improving patient might not need further work-up but if the nodules are persistent might need biopsy

## 2022-12-15 NOTE — ASSESSMENT & PLAN NOTE
Results from last 7 days   Lab Units 12/14/22  0457 12/13/22  1559 12/13/22  0519 12/12/22  0442 12/11/22  0643 12/10/22  0611 12/09/22  0515 12/08/22  0614   SODIUM mmol/L 129* 127* 128* 130* 134* 131* 133* 131*     In the setting of volume overload  · Demadex was held in the setting of worsening creatinine but restarted again on 12/13  · Sodium level slowly improving    Nephrology consulted  · Continue on 1200 mL fluid restriction

## 2022-12-15 NOTE — ASSESSMENT & PLAN NOTE
Wt Readings from Last 3 Encounters:   12/07/22 120 kg (265 lb)   11/22/22 121 kg (265 lb 10 5 oz)   10/20/22 120 kg (265 lb)     On Demadex 40 mg p o  Daily at home  · 2D echo in January 2022 showed normal EF, 65%, moderate concentric hypertrophy, dilated atriums, severe AS, mild to moderate AR, moderate MS  · Chest X-ray showed cardiomegaly with moderate congestive changes  · 1-2+ lower leg edema on exam, albumin 2 6  BNP was elevated at 14,000  · Demadex was held initially in the setting of sepsis and then later in the setting of worsening creatinine    Also received dose of Lasix on 12/8 and 12/9  · Currently restarted on 20 mg of Demadex daily

## 2022-12-15 NOTE — ASSESSMENT & PLAN NOTE
Status post PCI with stents x 2   · Continue statin, Imdur  · Patient was recommended to stop Plavix during prior admission

## 2022-12-15 NOTE — ASSESSMENT & PLAN NOTE
Results from last 7 days   Lab Units 12/15/22  0523 12/14/22  0457 12/13/22  1559 12/13/22  0519 12/12/22  0442 12/11/22  0643 12/10/22  0611 12/09/22  0515   SODIUM mmol/L 131* 129* 127* 128* 130* 134* 131* 133*     In the setting of volume overload  · Demadex was held in the setting of worsening creatinine but since restarted on 12/13  · Sodium level slowly improving    Nephrology input appreciated  · Continue on 1200 mL fluid restriction

## 2022-12-16 LAB
ANION GAP SERPL CALCULATED.3IONS-SCNC: 8 MMOL/L (ref 4–13)
BUN SERPL-MCNC: 42 MG/DL (ref 5–25)
CALCIUM SERPL-MCNC: 8.5 MG/DL (ref 8.3–10.1)
CHLORIDE SERPL-SCNC: 97 MMOL/L (ref 96–108)
CO2 SERPL-SCNC: 30 MMOL/L (ref 21–32)
CREAT SERPL-MCNC: 1.53 MG/DL (ref 0.6–1.3)
ERYTHROCYTE [DISTWIDTH] IN BLOOD BY AUTOMATED COUNT: 20.7 % (ref 11.6–15.1)
GFR SERPL CREATININE-BSD FRML MDRD: 41 ML/MIN/1.73SQ M
GLUCOSE SERPL-MCNC: 130 MG/DL (ref 65–140)
HCT VFR BLD AUTO: 26.2 % (ref 36.5–49.3)
HGB BLD-MCNC: 7.8 G/DL (ref 12–17)
MAGNESIUM SERPL-MCNC: 2.2 MG/DL (ref 1.6–2.6)
MCH RBC QN AUTO: 26.4 PG (ref 26.8–34.3)
MCHC RBC AUTO-ENTMCNC: 29.8 G/DL (ref 31.4–37.4)
MCV RBC AUTO: 89 FL (ref 82–98)
PLATELET # BLD AUTO: 212 THOUSANDS/UL (ref 149–390)
PMV BLD AUTO: 9.3 FL (ref 8.9–12.7)
POTASSIUM SERPL-SCNC: 4.4 MMOL/L (ref 3.5–5.3)
PTH-INTACT SERPL-MCNC: 77.4 PG/ML (ref 18.4–80.1)
RBC # BLD AUTO: 2.96 MILLION/UL (ref 3.88–5.62)
SODIUM SERPL-SCNC: 135 MMOL/L (ref 135–147)
WBC # BLD AUTO: 8.96 THOUSAND/UL (ref 4.31–10.16)

## 2022-12-16 RX ORDER — SODIUM CHLORIDE FOR INHALATION 0.9 %
3 VIAL, NEBULIZER (ML) INHALATION
Status: DISCONTINUED | OUTPATIENT
Start: 2022-12-16 | End: 2022-12-20 | Stop reason: HOSPADM

## 2022-12-16 RX ORDER — TORSEMIDE 20 MG/1
40 TABLET ORAL DAILY
Status: DISCONTINUED | OUTPATIENT
Start: 2022-12-16 | End: 2022-12-16

## 2022-12-16 RX ORDER — TORSEMIDE 20 MG/1
20 TABLET ORAL DAILY
Status: DISCONTINUED | OUTPATIENT
Start: 2022-12-16 | End: 2022-12-16

## 2022-12-16 RX ORDER — LEVALBUTEROL 1.25 MG/.5ML
1.25 SOLUTION, CONCENTRATE RESPIRATORY (INHALATION)
Status: DISCONTINUED | OUTPATIENT
Start: 2022-12-16 | End: 2022-12-20 | Stop reason: HOSPADM

## 2022-12-16 RX ORDER — ALBUTEROL SULFATE 2.5 MG/3ML
2.5 SOLUTION RESPIRATORY (INHALATION) EVERY 4 HOURS PRN
Status: DISCONTINUED | OUTPATIENT
Start: 2022-12-16 | End: 2022-12-20 | Stop reason: HOSPADM

## 2022-12-16 RX ORDER — BUMETANIDE 0.25 MG/ML
1 INJECTION, SOLUTION INTRAMUSCULAR; INTRAVENOUS 2 TIMES DAILY
Status: DISCONTINUED | OUTPATIENT
Start: 2022-12-16 | End: 2022-12-18

## 2022-12-16 RX ADMIN — FINASTERIDE 5 MG: 5 TABLET, FILM COATED ORAL at 08:43

## 2022-12-16 RX ADMIN — ISODIUM CHLORIDE 3 ML: 0.03 SOLUTION RESPIRATORY (INHALATION) at 20:38

## 2022-12-16 RX ADMIN — METHOCARBAMOL 500 MG: 500 TABLET ORAL at 08:59

## 2022-12-16 RX ADMIN — Medication 250 MG: at 18:01

## 2022-12-16 RX ADMIN — TORSEMIDE 40 MG: 20 TABLET ORAL at 08:59

## 2022-12-16 RX ADMIN — DULOXETINE HYDROCHLORIDE 60 MG: 60 CAPSULE, DELAYED RELEASE ORAL at 18:01

## 2022-12-16 RX ADMIN — DOCUSATE SODIUM 100 MG: 100 CAPSULE, LIQUID FILLED ORAL at 22:50

## 2022-12-16 RX ADMIN — UMECLIDINIUM 1 PUFF: 62.5 AEROSOL, POWDER ORAL at 08:44

## 2022-12-16 RX ADMIN — Medication 250 MG: at 08:43

## 2022-12-16 RX ADMIN — HEPARIN SODIUM 5000 UNITS: 5000 INJECTION INTRAVENOUS; SUBCUTANEOUS at 05:48

## 2022-12-16 RX ADMIN — LIDOCAINE 5% 1 PATCH: 700 PATCH TOPICAL at 08:41

## 2022-12-16 RX ADMIN — FLUTICASONE FUROATE AND VILANTEROL TRIFENATATE 1 PUFF: 200; 25 POWDER RESPIRATORY (INHALATION) at 08:44

## 2022-12-16 RX ADMIN — ALBUTEROL SULFATE 2.5 MG: 2.5 SOLUTION RESPIRATORY (INHALATION) at 07:28

## 2022-12-16 RX ADMIN — GABAPENTIN 100 MG: 100 CAPSULE ORAL at 22:46

## 2022-12-16 RX ADMIN — LEVALBUTEROL HYDROCHLORIDE 1.25 MG: 1.25 SOLUTION, CONCENTRATE RESPIRATORY (INHALATION) at 13:41

## 2022-12-16 RX ADMIN — CEFAZOLIN SODIUM 2000 MG: 2 SOLUTION INTRAVENOUS at 22:45

## 2022-12-16 RX ADMIN — DULOXETINE HYDROCHLORIDE 60 MG: 60 CAPSULE, DELAYED RELEASE ORAL at 08:43

## 2022-12-16 RX ADMIN — METHYLPREDNISOLONE SODIUM SUCCINATE 40 MG: 40 INJECTION, POWDER, FOR SOLUTION INTRAMUSCULAR; INTRAVENOUS at 08:44

## 2022-12-16 RX ADMIN — BUMETANIDE 1 MG: 0.25 INJECTION INTRAMUSCULAR; INTRAVENOUS at 13:26

## 2022-12-16 RX ADMIN — SENNOSIDES 8.6 MG: 8.6 TABLET, FILM COATED ORAL at 22:46

## 2022-12-16 RX ADMIN — ATORVASTATIN CALCIUM 40 MG: 40 TABLET, FILM COATED ORAL at 16:43

## 2022-12-16 RX ADMIN — CEFAZOLIN SODIUM 2000 MG: 2 SOLUTION INTRAVENOUS at 05:47

## 2022-12-16 RX ADMIN — PANTOPRAZOLE SODIUM 40 MG: 40 TABLET, DELAYED RELEASE ORAL at 05:47

## 2022-12-16 RX ADMIN — BUMETANIDE 1 MG: 0.25 INJECTION INTRAMUSCULAR; INTRAVENOUS at 18:01

## 2022-12-16 RX ADMIN — METHYLPREDNISOLONE SODIUM SUCCINATE 40 MG: 40 INJECTION, POWDER, FOR SOLUTION INTRAMUSCULAR; INTRAVENOUS at 22:46

## 2022-12-16 RX ADMIN — ISODIUM CHLORIDE 3 ML: 0.03 SOLUTION RESPIRATORY (INHALATION) at 13:41

## 2022-12-16 RX ADMIN — LEVALBUTEROL HYDROCHLORIDE 1.25 MG: 1.25 SOLUTION, CONCENTRATE RESPIRATORY (INHALATION) at 20:38

## 2022-12-16 RX ADMIN — ALLOPURINOL 200 MG: 100 TABLET ORAL at 08:43

## 2022-12-16 RX ADMIN — ISOSORBIDE MONONITRATE 30 MG: 30 TABLET, EXTENDED RELEASE ORAL at 08:43

## 2022-12-16 RX ADMIN — DOCUSATE SODIUM 100 MG: 100 CAPSULE, LIQUID FILLED ORAL at 08:43

## 2022-12-16 RX ADMIN — CEFAZOLIN SODIUM 2000 MG: 2 SOLUTION INTRAVENOUS at 13:29

## 2022-12-16 RX ADMIN — RIVAROXABAN 15 MG: 15 TABLET, FILM COATED ORAL at 13:29

## 2022-12-16 NOTE — PROGRESS NOTES
NEPHROLOGY PROGRESS NOTE   Melissa Fernandez 80 y o  male MRN: [id number]  Unit/Bed#: 2 [place] [address] Encounter: [id number]  Reason for Consult: Hyponatremia    ASSESSMENT AND PLAN:  80 y  o  man with PMH of COPD, CAD status post PCI, CHF, BPH, GERD, arctic stenosis, A  fib, p/w lethargy and weakness   [facility] is consulted for management of hyponatremia      PLAN     #Hyposomolar Hyponatremia   • Sodium on admission: 133 mEq/L  • Serum osm: 285   • Urine osm: 326 (elevated) reflects presence of ADH  • Urine Sodium: 7  • Current sodium: 135 mEq/L, resolved  • Etiology: Multifactorial likely secondary to SIADH in the settings of pulmonary nodules, possible malignancy, Cymbalta and component of fluid overload   • Rate of correction: Appropriate correction  • Plan:  • Torsemide held for CT with contrast   • Restart torsemide 40 mg a m  and 20 mg p m  as per home dose   • Restriction 1 2 L   • BMP Q 24   • Avoid overcorrection: no more than 6-8mEq in the next 24hr, goal </=135     #Volume status/blood pressure:  • Volume: Fluid overload  • Blood pressure:   Normotensive /63mmHg, goal<140/90  • Recommend:  • Increase torsemide to 40 mg-20mg as above      #CKD G3b  • Baseline creatinine: 1 5 to 1 8 mg/dL  • Current creatinine: Remains at baseline  • Etiology: Secondary to nephrosclerosis in the settings of vascular disease  • UA: No hematuria, no leukocyturia  • Plan:  • No indication of urgent dialysis at this time  • Maintain mean arterial pressure above 65 mmHg to avoid hypotension/hypoperfusion  • Avoid nephrotoxic agents such as NSAIDs and contrast if at all possible    • Avoid opioids   • Adjust medications to GFR  • Renal diet      #Acid base disorder  • serum HCO3 30 mmol/L  • Metabolic alkalosis likely secondary to muscular contraction in the settings of diuretics     #CKD-MBD/hypercalcemia  • Calcium  10 2 mg/dL corrected for albumin  • Possible secondary to malignancy     #Secondary Hyperparathyroidism   • Will check iPTH      #Anemia:  • Current hemoglobin: 7 9 mg/dL  • Treatment:  • Transfuse for hemoglobin less than 7 0 per primary service       #AMS  • Possible metastasis  • Neurology following  • CT head with chronic findings     #Pulmonary nodules  • Possible malignancy     #MSSA bacteremia  • On cefazolin  • Follow-up by ID         SUBJECTIVE:  Patient seen and examined at bedside   No chest pain, planes of shortness of breat      OBJECTIVE:  Current Weight: Weight - Scale: 120 kg (265 lb)  Vitals:    12/16/22 0800   BP: 122/63   Pulse: 74   Resp: 18   Temp: (!) 97 °F (36 1 °C)   SpO2: 98%       Intake/Output Summary (Last 24 hours) at 12/16/2022 0852  Last data filed at 12/16/2022 0403  Gross per 24 hour   Intake --   Output 1100 ml   Net -1100 ml     Wt Readings from Last 3 Encounters:   12/07/22 120 kg (265 lb)   11/22/22 121 kg (265 lb 10 5 oz)   10/20/22 120 kg (265 lb)     Temp Readings from Last 3 Encounters:   12/16/22 (!) 97 °F (36 1 °C)   11/22/22 97 6 °F (36 4 °C)   10/05/22 (!) 97 4 °F (36 3 °C)     BP Readings from Last 3 Encounters:   12/16/22 122/63   11/22/22 119/64   10/20/22 130/70     Pulse Readings from Last 3 Encounters:   12/16/22 74   11/22/22 70   10/20/22 86      General:  Obese, no acute distress at this time  Skin:  No acute rash  Eyes:  No scleral icterus and noninjected  ENT:  mucous membranes moist  Neck:  no carotid bruits  Chest: Fine bilateral crackles, no wheezing  CVS:  Regular rate and rhythm without a murmur rub , no jugular venous distention,  Abdomen:  soft and nontender   Extremities: Lower extremity edema 1  Neuro:  No gross focality  Psych:  Alert , cooperative       Medications:    Current Facility-Administered Medications:   •  acetaminophen (TYLENOL) tablet 650 mg, 650 mg, Oral, Q6H PRN, NAVA Coleman, 650 mg at 12/08/22 1531  •  albuterol inhalation solution 2 5 mg, 2 5 mg, Nebulization, 4x Daily, Lynn Montano DO, 2 5 mg at 12/16/22 5143  •  albuterol inhalation solution 2 5 mg, 2 5 mg, Nebulization, Q4H PRN, Lynn Velezar, DO, 2 5 mg at 12/15/22 1845  •  allopurinol (ZYLOPRIM) tablet 200 mg, 200 mg, Oral, Daily, Kel Arce, CRNP, 200 mg at 12/16/22 3345  •  atorvastatin (LIPITOR) tablet 40 mg, 40 mg, Oral, Daily With Dinner, JAMILAH ColemanNP, 40 mg at 12/15/22 1539  •  ceFAZolin (ANCEF) IVPB (premix in dextrose) 2,000 mg 50 mL, 2,000 mg, Intravenous, Q8H, Ron Ellis MD, Last Rate: 100 mL/hr at 12/16/22 0547, 2,000 mg at 12/16/22 0547  •  docusate sodium (COLACE) capsule 100 mg, 100 mg, Oral, BID, Ron Ellis MD, 100 mg at 12/16/22 0843  •  DULoxetine (CYMBALTA) delayed release capsule 60 mg, 60 mg, Oral, BID, Kel Arce, CRNP, 60 mg at 12/16/22 0843  •  finasteride (PROSCAR) tablet 5 mg, 5 mg, Oral, Daily, Kel Arce, CRNP, 5 mg at 12/16/22 0843  •  fluticasone-vilanterol 200-25 mcg/actuation 1 puff, 1 puff, Inhalation, Daily, NAVA Coleman, 1 puff at 12/16/22 0844  •  gabapentin (NEURONTIN) capsule 100 mg, 100 mg, Oral, HS, Cucristal Hanksrirajendra, CRNP, 100 mg at 12/15/22 2146  •  heparin (porcine) subcutaneous injection 5,000 Units, 5,000 Units, Subcutaneous, Q8H [address], Lynn Revjonesar, DO, 5,000 Units at 12/16/22 0548  •  isosorbide mononitrate (IMDUR) 24 hr tablet 30 mg, 30 mg, Oral, Daily, Kel Arce, CRNP, 30 mg at 12/16/22 0843  •  lidocaine (LIDODERM) 5 % patch 1 patch, 1 patch, Topical, Daily, Shelby Mueller PA-C, 1 patch at 12/16/22 0841  •  methocarbamol (ROBAXIN) tablet 500 mg, 500 mg, Oral, Q6H PRN, Lynn Revankar, DO, 500 mg at 12/14/22 2108  •  methylPREDNISolone sodium succinate (Solu-MEDROL) injection 40 mg, 40 mg, Intravenous, Q12H [address], Lynn Revankar, DO, 40 mg at 12/16/22 0844  •  ondansetron (ZOFRAN) injection 4 mg, 4 mg, Intravenous, Q6H PRN, NAVA Coleman, 4 mg at 12/13/22 1150  •  pantoprazole (PROTONIX) EC tablet 40 mg, 40 mg, Oral, Early Morning, NAVA Coleman, 40 mg at 12/16/22 0547  •  polyethylene glycol (MIRALAX) packet 17 g, 17 g, Oral, Daily PRN, NAVA Coleman  •  polyethylene glycol (MIRALAX) packet 17 g, 17 g, Oral, Daily, Ron Ellis MD, 17 g at 12/14/22 0857  •  saccharomyces boulardii (FLORASTOR) capsule 250 mg, 250 mg, Oral, BID, NAVA Coleman, 250 mg at 12/16/22 9947  •  senna (SENOKOT) tablet 8 6 mg, 1 tablet, Oral, HS, Yunior Horne MD, 8 6 mg at 12/15/22 2146  •  torsemide (DEMADEX) tablet 20 mg, 20 mg, Oral, Daily, Maribeth Fritz MD  •  torsemide [facility]) tablet 40 mg, 40 mg, Oral, Daily, Maribeth Fritz MD  •  umeclidinium 62 5 mcg/actuation inhaler AEPB 1 puff, 1 puff, Inhalation, Daily, NAVA Coleman, 1 puff at 12/16/22 0844    Laboratory Results:  Results from last 7 days   Lab Units 12/16/22  0544 12/15/22  0523 12/14/22  0457 12/13/22  1559 12/13/22  0519 12/12/22  0442 12/11/22  0643 12/10/22  0611   WBC Thousand/uL 8 96 7 55 8 98  --  11 59* 12 77* 14 68* 13 62*   HEMOGLOBIN g/dL 7 8* 7 8* 7 7*  --  8 3* 8 3* 8 3* 7 9*   HEMATOCRIT % 26 2* 25 7* 25 4*  --  27 1* 27 0* 26 5* 25 4*   PLATELETS Thousands/uL 212 210 232  --  249 227 194 170   SODIUM mmol/L 135 131* 129* 127* 128* 130* 134* 131*   POTASSIUM mmol/L 4 4 4 1 4 2 4 3 4 4 4 3 4 4 4 2   CHLORIDE mmol/L 97 95* 91* 92* 92* 93* 97 95*   CO2 mmol/L 30 30 28 29 26 27 27 28   BUN mg/dL 42* 48* 50* 54* 48* 43* 41* 38*   CREATININE mg/dL 1 53* 1 69* 1 88* 1 97* 1 86* 1 81* 1 74* 1 61*   CALCIUM mg/dL 8 5 8 4 8 6 8 7 8 8 8 8 8 5 8 5   MAGNESIUM mg/dL 2 2 2 2  --   --   --   --   --   --        XR chest portable   Final Result by Efrem Young MD (12/15 1946)      Interstitial pulmonary edema with bilateral pleural effusions and bibasilar atelectasis  Cardiomegaly                    Workstation performed: [id number]         CT head w wo contrast   Final Result by Nirmal Croft MD (12/15 7469)      Subcortical hypodensities reported on recent CT examination do not demonstrate enhancement to suggest abscess  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                     Workstation performed: [id number]         IR tunneled central line placement   Final Result by Maynor Coulter MD (12/13 7865)      Insertion of right-sided dual-lumen tunneled small bore central venous catheter, with tip in the expected location of the cavoatrial junction  Plan:       The catheter may be used immediately  Workstation performed: [id number]         CT spine cervical wo contrast   Final Result by Clare Lobo MD (12/12 1600)   1  The cervical vertebral body heights are maintained demonstrating no acute fracture or subluxation  2  Multilevel cervical degenerative disc disease similar to 11/13/2018  3  New focal nodular lesion within the anterior right lung apex  Please refer to the CT chest examination of 12/6/2022 for further details  Small bilateral pleural effusions, new on the left  Workstation performed: [id number]         CT head wo contrast   Final Result by Clare Lobo MD (12/12 9727)      1  Persistent small patchy subcortical white matter hypodensities involving the high right frontoparietal, right lateral frontal and right posterior parietal regions concerning for vasogenic edema related to metastatic foci  MRI of the brain with    contrast is recommended for further evaluation  2   Mild chronic microtraumatic ischemic changes  The study was marked in EPIC for significant notification                 Workstation performed: [id number]         XR abdomen obstruction series   Final Result by Timothy Rosario MD (12/12 6629)      No evidence of small bowel obstruction or free air   Moderate amount of colonic stool         Workstation performed: [id number]         CT head without contrast   Final Result by Tiffany Hathaway MD (12/06 6898)   Addendum (preliminary) 1 of 1 by Tiffany Hathaway MD (12/06 9226)   ADDENDUM:      In light of the concurrent chest CT findings, the right parieto-occipital abnormality may also represent a metastasis      Final      Hypodensity in the right parieto-occipital region most likely represents a chronic infarct, however no prior study is available to confirm this      The study was marked in [facility] for immediate notification  Workstation performed: [id number]         CT chest without contrast   Final Result by Re Allen MD (12/06 7564)      1  Numerous new pulmonary nodules, concerning for metastatic disease   2  Mediastinal and hilar lymphadenopathy, also concerning for metastases   3  Small right pleural effusion               Workstation performed: [id number]         XR chest 1 view portable   Final Result by Tony Banerjee MD (12/06 1025)      Prominent pulmonary vasculature and interstitial markings suggesting moderate congestive changes, stable  Workstation performed: [id number]             Portions of the record may have been created with voice recognition software  Occasional wrong word or "sound a like" substitutions may have occurred due to the inherent limitations of voice recognition software  Read the chart carefully and recognize, using context, where substitutions have occurred

## 2022-12-16 NOTE — ASSESSMENT & PLAN NOTE
Results from last 7 days   Lab Units 12/16/22  0544 12/15/22  0523 12/14/22  0457 12/13/22  1559 12/13/22  0519 12/12/22  0442 12/11/22  0643 12/10/22  0611   SODIUM mmol/L 135 131* 129* 127* 128* 130* 134* 131*     In the setting of volume overload  · Demadex was held in the setting of worsening creatinine but then restarted on 12/13  · Currently on IV Bumex twice daily as noted above  · Sodium level slowly improved    Nephrology input appreciated  · Continue on 1200 mL fluid restriction

## 2022-12-16 NOTE — PROGRESS NOTES
Progress Note - Infectious Disease   Reta Torres 80 y o  male MRN: [id number]  Unit/Bed#: 1990 [facility] Encounter: [id number]      Impression/Plan:  1  MSSA bacteremia  POA, 2/2 admission blood cultures growing MSSA  Unclear source, may be skin translocation as the patient has multiple scabs on his arms and legs  This is complicated by the presence of a PPM  TTE with AV and MV thickening, no clear vegetations  MARY with no obvious large vegetations, but given significant calcification cannot rule out small lesion  CT chest with numerous pulmonary nodules, could be septic emboli vs less likely metastatic disease  This does raise concern for a primary endovascular source of infection  Discussed treatment options with the family and Cardiology  PPM removal is recommended in the setting of staph bacteremia regardless of the primary source, however the pacemaker has been in place 12 years and removal would be an extensive surgical procedure  Given his comorbidities, there are risks to surgery  His family would like to pursue a more conservative approach with antibiotics before any surgery  Repeat blood cultures no growth  -continues IV Cefazolin 2g q8hr  -Plan to complete a 6 week course of antibiotics from blood culture clearance through 1/17/23 (script provided to CM)  -Weekly CBC with differential and BMP on IV antibiotics  -will need surveillance blood cultures 2 weeks after completion of antibiotic course  -if there is recurrence the patient will require pacemaker extraction  -outpatient ID follow-up on 12/23/22 at 8:30 am     2  Weakness, encephalopathy  Likely toxic metabolic due to infection as above  Karlos Crowell 92 showed a hypodensity in the right parieto-occipital region most likely a chronic infarct or metastatic lesion  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   Encephalopathy has improved  The patient appears depressed with flat affect    Noted weakness holding his head up today, but no neck pain   CT of the cervical spine shows no abnormalities  -continue treatment of infection as above  -neurology follow up  -monitor mental status  -PT/OT     3  Leukocytosis  Likely due to #1 above  Patient is afebrile, hemodynamically stable  WBC count stable  -continue antibiotics as above  -monitor WBC count, fever curve     4  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  Complicated by MSSA bacteremia  MARY no clear vegetations   -continue management as above     5  Pulmonary nodules and adenopathy  CT chest shows numerous new pulmonary nodules and mediastinal and hilar lymphadenopathy, concerning for metastases however in setting of staph aureus bacteremia could be septic emboli  Patient had a colonoscopy last month without abnormalities  He is a long term smoker  -pulmonary consulted  -will need serial imaging after 4-6 weeks of antibiotics; if persistent will require work up for malignancy     6  CKD  Creatinine stable at baseline  CrCl 51%  -Antibiotics dose adjusted as needed      7  Shortness of breath  PCXR with pulmonary edema and lateral pleural effusions and atelectasis  -Diuresis per primary care team    I have discussed the above management plan in detail with Dr Sophia Prather and the patient  We will see patient again 22 if here  Please call ID on call physician in meantime if questions      Antibiotics:  Cefazolin - day 10 from 1st negative blood cultures     Subjective:  Patient has no fever, chills, sweats; no nausea, vomiting, diarrhea; no cough, + shortness of breath at rest and with lying flat for repositioning in bed; no pain complaints at present  No new symptoms      Objective:  Vitals:  Temp:  [96 4 °F (35 8 °C)-97 9 °F (36 6 °C)] 97 °F (36 1 °C)  HR:  [69-80] 74  Resp:  [18-24] 18  BP: (116-137)/(60-68) 122/63  SpO2:  [93 %-100 %] 98 %  Temp (24hrs), Av 2 °F (36 2 °C), Min:96 4 °F (35 8 °C), Max:97 9 °F (36 6 °C)  Current: Temperature: (!) 97 °F (36 1 °C)    Physical Exam:   General Appearance:  80year old male, chronically debilitated, + work of breathing lying flat to reposition in bed   HEENT: Atraumatic normocephalic   Throat: Oropharynx moist     Pulmonary:    scattered coarse breath sounds throughout, + breathing with accessory muscle use, on 3L NCO2 statting 95%   Cardiac:  RRR, pacer site nontender   Abdomen:   Soft, non-tender, non-distended   Extremities: No edema   : No denney, no SPT   Psychiatric: Awake, cooperative   Skin: No new rashes  Right chest PICC site nontender          Labs, Imaging, & Other studies:   All pertinent labs and imaging studies were personally reviewed  Results from last 7 days   Lab Units 12/16/22  0544 12/15/22  0523 12/14/22  0457   WBC Thousand/uL 8 96 7 55 8 98   HEMOGLOBIN g/dL 7 8* 7 8* 7 7*   PLATELETS Thousands/uL 212 210 232     Results from last 7 days   Lab Units 12/16/22  0544 12/15/22  0523 12/14/22  0457   SODIUM mmol/L 135 131* 129*   POTASSIUM mmol/L 4 4 4 1 4 2   CHLORIDE mmol/L 97 95* 91*   CO2 mmol/L 30 30 28   BUN mg/dL 42* 48* 50*   CREATININE mg/dL 1 53* 1 69* 1 88*   EGFR ml/min/1 73sq m 41 37 32   CALCIUM mg/dL 8 5 8 4 8 6   AST U/L  --  46*  --    ALT U/L  --  9*  --    ALK PHOS U/L  --  118*  --

## 2022-12-16 NOTE — ASSESSMENT & PLAN NOTE
Patient presented with somnolence for 24 hours and generalized weakness  Reported a little cough and runny nose, denies sore throat/SOB/fever/chills  · Patient hospitalized in Nov '22 for symptomatic anemia, hemoglobin 7 1 on admission with positive stool guaiac, received 1 unit of PRBC, underwent EGD and colonoscopy both showed no evidence of bleeding  Outpatient capsule endoscopy was recommended which is still pending   Patient remained on Xarelto  · WBC 15 38, no bands, patient afebrile initially  · Lactic acid normal   Procalcitonin elevated  · Chest x-ray showed no evidence of pneumonia  · UA no evidence of infection  COVID-19, flu RSV negative  · CT chest showed -  Numerous new pulmonary nodules, concerning for mets; Mediastinal and hilar lymphadenopathy, concerning for metastases; Small right pleural effusion with adjacent basal opacity, most likely represents atelectasis  Nodules could possibly be septic emboli  · Patient was given Zosyn in the ED, de-escalated to Rocephin  Currently on Ancef 2 g every 8 hours  Plan for 6 weeks course  · Blood cultures 2 out of 2 growing Methicillin sensitive Staphylococcus aureus  · ID input appreciated  · Echocardiogram showed EF of 60% with severe concentric hypertrophy, EF of 60% without any visible vegetations with severe attic stenosis  · Patient underwent MARY which showed EF of 55% without any large vegetation but cannot rule out small vegetation due to calcium and acoustic shadowing  · Unclear source-possible from skin translocation as patient noted to have multiple scabs  · Pacemaker removal was recommended by ID  per cardiology, pacemaker has been in place for 12 years and removal would be an extensive surgical procedure    Given multiple comorbidities cardiology recommended conservative approach and treatment with IV antibiotics  · Repeat blood cultures from 12/7/2022 negative

## 2022-12-16 NOTE — PLAN OF CARE
Recommended Diet: mechanically altered/level 2 diet and thin liquids   Recommended Form of Meds: as tolerated (took multiple whole medications with nectar and 1 medication whole w/thin with no s/s dysphagia)    Note: when 2 small medications where placed in applesauce to begin, pt chewed medication)   Aspiration precautions and swallowing strategies: upright posture, only feed when fully alert and slow rate of feeding  Other Recommendations: Continue frequent oral care

## 2022-12-16 NOTE — ASSESSMENT & PLAN NOTE
On Trelegy, albuterol nebulizer p r n  At home  · Was initially on 2 L oxygen but currently remains on room air  · Substituted Trelegy with [facility] and Incruse  · Patient was noted to be wheezing on exam on 12/15    Started on 40 mg of IV Solu-Medrol every 12 hours   · continue scheduled nebulizer treatments

## 2022-12-16 NOTE — PROGRESS NOTES
Hematology - Oncology Progress Note    Valentine Holloway, [birth year], [id number]  [address] [facility]      Impression and plan:    77-year-old male with multiple medical problems admitted with progressive weakness and fatigue  Patient is being treated for MSSA bacteremia, being followed by infectious disease, on IV antibiotics  Etiology for mental status changes is still not clear - mental status today seems to be about the same as before  No pain control issues  The plan is to continue with the antibiotics and eventually repeat scanning (later when patient is discharged and better)  It may be that the pulmonary nodules are embolic/infection associated  CAT scan of the head was recently performed with contrast   No evidence of metastatic disease or evidence of abscess formation  Radiologist felt that 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  I was able to specifically discuss prior cancers with the patient's daughter  She emphatically stated that Mr Valerie Lind has never been diagnosed with any malignancy  Etiology for the anemia is likely multifactorial   As discussed previously -acute illness, infection, anemia chronic renal insufficiency, blood drawing etc  Continue to monitor the trend  Possibly, in the future, patient will be a candidate for an LA[facility]ENCE  From an oncology standpoint, no additional work-up is needed looking for a malignancy  When Mr Valerie Lind has been discharged/transferred, patient will eventually be brought back to the office for follow-up  Discussed with Dr Mario Vogel, hospitalist   ________________________________________________________________________________    Chief complaint sent for admission: Generalized weakness    History of present illness:  77-year-old male admitted with progressive somnolence, progressive fatigue and generalized weakness (December 6, 2022)    Past medical history includes anemia, chronic kidney disease, hypertension, atrial fibrillation pacemaker placement, COPD, CHF, CAD, BPH, reflux disease, hyperlipidemia, aortic stenosis, sleep apnea and obesity      Recent work-up demonstrated pulmonary lesions as well as a brain lesion of unclear etiology      Mr Salazar  was eating lunch  Daughter was at bedside  Fatigue is the same as before  Appetite has improved  No pain control issues  Activities are still extremely limited      Hospital medications list:  Current Facility-Administered Medications   Medication Dose Route Frequency Provider Last Rate   • acetaminophen  650 mg Oral Q6H PRN NAVA Coleman     • albuterol  2 5 mg Nebulization Q4H PRN Lynn Revankar, DO     • albuterol  2 5 mg Nebulization Q4H PRN Lynn Revankar, DO     • allopurinol  200 mg Oral Daily NAVA Coleman     • atorvastatin  40 mg Oral Daily With NAVA French     • bumetanide  1 mg Intravenous BID Chetna Harris MD     • cefazolin  2,000 mg Intravenous Q8H Ron Ellis MD 2,000 mg (12/16/22 1329)   • docusate sodium  100 mg Oral BID Ron Ellis MD     • DULoxetine  60 mg Oral BID NAVA Coleman     • finasteride  5 mg Oral Daily NAVA Coleman     • fluticasone-vilanterol  1 puff Inhalation Daily NAVA Coleman     • gabapentin  100 mg Oral HS NAVA Coleman     • isosorbide mononitrate  30 mg Oral Daily NAVA Coleman     • levalbuterol  1 25 mg Nebulization TID Lynn Revankar, DO      And   • sodium chloride  3 mL Nebulization TID Lynn Revankar, DO     • lidocaine  1 patch Topical Daily Shelby Mueller PA-C     • methocarbamol  500 mg Oral Q6H PRN Lynn Revankar, DO     • methylPREDNISolone sodium succinate  40 mg Intravenous Q12H [address] Lynn Revankar, DO     • ondansetron  4 mg Intravenous Q6H PRN NAVA Coleman     • pantoprazole  40 mg Oral Early Morning NAVA Coleman     • polyethylene glycol  17 g Oral Daily PRN NAVA Coleman     • polyethylene glycol  17 g Oral Daily Ron MD Rhonda     • rivaroxaban  15 mg Oral Daily With Breakfast Lynn Montano, DO     • saccharomyces boulardii  250 mg Oral BID NAVA Coleman     • senna  1 tablet Oral HS Calvin Bass MD     • umeclidinium  1 puff Inhalation Daily NAVA Coleman       Physical exam  /78   Pulse 75   Temp (!) 97 °F (36 1 °C)   Resp 18   Ht 6' 2" (1 88 m)   Wt 120 kg (265 lb)   SpO2 95%   BMI 34 02 kg/m²   Constitutional: Obese male, minimal respiratory distress, nasal cannula O2 in place  Respiratory: Fair air entry bilaterally, coarse bilateral rhonchi  Cardiovascular: Normal rate, normal rhythm, no murmurs, no gallops, no rubs    GI: Soft, nondistended, obese cannot palpate liver or spleen  : No costovertebral angle tenderness, normal reproductive organs, no discharge  Integument: Pale, warm, moist, scattered purpura  Lymphatic: No adenopathy in the neck, supra-clavicular region, axilla and groin bilaterally  Extremities: 1+ bilateral lower extremity edema plus obesity, no cords, pulses are 1+  Neurologic: Alert & oriented x 3, CN 2-12 normal, normal motor function, normal sensory function  Rectal: Deferred    Laboratory test results     Latest Reference Range & Units 12/16/22 05:44   WBC 4 31 - 10 16 Thousand/uL 8 96   Red Blood Cell Count 3 88 - 5 62 Million/uL 2 96 (L)   Hemoglobin 12 0 - 17 0 g/dL 7 8 (L)   HCT 36 5 - 49 3 % 26 2 (L)   MCV 82 - 98 fL 89   MCH 26 8 - 34 3 pg 26 4 (L)   MCHC 31 4 - 37 4 g/dL 29 8 (L)   RDW 11 6 - 15 1 % 20 7 (H)   Platelet Count 949 - 390 Thousands/uL 212      Latest Reference Range & Units 12/16/22 05:44   BUN 5 - 25 mg/dL 42 (H)   Creatinine 0 60 - 1 30 mg/dL 1 53 (H)     Imaging    CT head with and without contrast    IMPRESSION:     Subcortical hypodensities reported on recent CT examination do not demonstrate enhancement to suggest abscess  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

## 2022-12-16 NOTE — ASSESSMENT & PLAN NOTE
Baseline hemoglobin appears to be around 7-9s  · No evidence of active bleeding     · Received 1 unit PRBC transfusion during this admission  · Stool for occult blood was negative x1  · Hemoglobin overall stable    Results from last 7 days   Lab Units 12/16/22  0544 12/15/22  0523 12/14/22  0457 12/13/22  0519 12/12/22  0442 12/11/22  0643 12/10/22  0611   HEMOGLOBIN g/dL 7 8* 7 8* 7 7* 8 3* 8 3* 8 3* 7 9*   HEMATOCRIT % 26 2* 25 7* 25 4* 27 1* 27 0* 26 5* 25 4*   MCV fL 89 87 88 87 87 85 86

## 2022-12-16 NOTE — QUICK NOTE
RN reports patient sounds gurgly with increased WOB, no improvement after neb treatment, but satting well on 2L NC  Patient seen at bedside  Awake, alert, reports SOB  Lung sounds rhonchi bilateral,1-2 + edema on lower extremities  Chest x-ray showed - Interstitial pulmonary edema with bilateral pleural effusions and bibasilar atelectasis  proBNP 5508    Demadex 20 mg was held today due to IV contrast study    Will order Lasix 20 IV x1, check BMP in the morning

## 2022-12-16 NOTE — ASSESSMENT & PLAN NOTE
Status post PCI with stents x 2   · Continue statin, Imdur    · Plavix was discontinued on prior admission

## 2022-12-16 NOTE — PROGRESS NOTES
Ricarda U  66   Progress Note Jovita Ace 1940, 80 y o  male MRN: [id number]  Unit/Bed#: 2 [place] [address] Encounter: [id number]  Primary Care Provider: Alesia Stephen, DO   Date and time admitted to hospital: 12/5/2022  6:39 PM    * MSSA bacteremia  Assessment & Plan  Patient presented with somnolence for 24 hours and generalized weakness  Reported a little cough and runny nose, denies sore throat/SOB/fever/chills  · Patient hospitalized in Nov '22 for symptomatic anemia, hemoglobin 7 1 on admission with positive stool guaiac, received 1 unit of PRBC, underwent EGD and colonoscopy both showed no evidence of bleeding  Outpatient capsule endoscopy was recommended which is still pending   Patient remained on Xarelto  · WBC 15 38, no bands, patient afebrile initially  · Lactic acid normal   Procalcitonin elevated  · Chest x-ray showed no evidence of pneumonia  · UA no evidence of infection  COVID-19, flu RSV negative  · CT chest showed -  Numerous new pulmonary nodules, concerning for mets; Mediastinal and hilar lymphadenopathy, concerning for metastases; Small right pleural effusion with adjacent basal opacity, most likely represents atelectasis  Nodules could possibly be septic emboli  · Patient was given Zosyn in the ED, de-escalated to Rocephin  Currently on Ancef 2 g every 8 hours  Plan for 6 weeks course  · Blood cultures 2 out of 2 growing Methicillin sensitive Staphylococcus aureus     · ID input appreciated  · Echocardiogram showed EF of 60% with severe concentric hypertrophy, EF of 60% without any visible vegetations with severe attic stenosis  · Patient underwent MARY which showed EF of 55% without any large vegetation but cannot rule out small vegetation due to calcium and acoustic shadowing  · Unclear source-possible from skin translocation as patient noted to have multiple scabs  · Pacemaker removal was recommended by ID  per cardiology, pacemaker has been in place for 12 years and removal would be an extensive surgical procedure  Given multiple comorbidities cardiology recommended conservative approach and treatment with IV antibiotics  · Repeat blood cultures from 12/7/2022 negative    Acute encephalopathy  Assessment & Plan  Likely acute toxic metabolic encephalopathy due to underlying infection  Reported feeling sleepy and weak  · CT head - Hypodensity in the right parieto-occipital region which could be chronic infarct versus metastatic disease based on the CT of the chest  · Mental status has improved with treatment of sepsis  · ABG no hypercapnia  · Patient was initially worked up for possible stroke  · Patient could not get MRI of the brain due to incompatible pacemaker  · Was given full dose aspirin in the ED along with Lipitor 80 mg  · Hemoglobin A1c was 5 1  · Repeat CT head 12/12 which shows persistent small patchy hypodensities concerning for vasogenic edema related to metastatic foci  · PT/OT evaluation and treatment    Chronic diastolic heart failure [facility])  Assessment & Plan  Wt Readings from Last 3 Encounters:   12/07/22 120 kg (265 lb)   11/22/22 121 kg (265 lb 10 5 oz)   10/20/22 120 kg (265 lb)     On Demadex 40 mg p o  Daily at home  · 2D echo in January 2022 showed normal EF, 65%, moderate concentric hypertrophy, dilated atriums, severe AS, mild to moderate AR, moderate MS  · Chest X-ray showed cardiomegaly with moderate congestive changes  · BNP was elevated at 14,000  · Demadex was held initially in the setting of sepsis and then later in the setting of worsening creatinine  Also received dose of Lasix on 12/8 and 12/9 and 12/15  · Restarted on 20 mg of Demadex daily on 12/13    Currently transitioned to IV Bumex twice daily as patient appears volume overloaded    Abnormal CAT scan  Assessment & Plan  CT chest - numerous new pulmonary nodules concerning for metastatic disease along with mediastinal and hilar lymphadenopathy and small right pleural effusion  · Patient had CT abdomen pelvis without contrast on 11/19/2022 which was unremarkable  · CT of the brain also showing hypodensity in the right parieto-occipital region - 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  Repeat CT head on 12/12 showed persistent hypodensities concerning for metastatic foci  · Discussed with neurology who was concerned for metastatic lesion on CT brain  Oncology was consulted  · CT with IV contrast showed that 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  · Patient did have a colonoscopy during prior hospitalization which showed no masses  · No history of malignancy as per family  · Pulmonary recommended IR guided lung biopsy  Lung nodules could also be related to septic emboli  Per ID, will need repeat CT scan in 3 months  If the nodules are improving patient might not need further work-up but if the nodules are persistent might need biopsy  Family in agreement with this plan      Elevated troponin  Assessment & Plan  Mild troponin elevation most likely non MI troponin elevation secondary to sepsis  · EKG showed paced rhythm Per prior provider  · Patient also complained of some chest pain on 12/8/22 and repeat troponin remains around 700  · Repeat EKG showed some inferolateral T wave inversion per prior provider  · Suspected secondary to symptomatic anemia  Symptoms improved after 1 unit of PRBC transfusion      Hyponatremia  Assessment & Plan  Results from last 7 days   Lab Units 12/16/22  0544 12/15/22  0523 12/14/22  0457 12/13/22  1559 12/13/22  0519 12/12/22  0442 12/11/22  0643 12/10/22  0611   SODIUM mmol/L 135 131* 129* 127* 128* 130* 134* 131*     In the setting of volume overload  · Demadex was held in the setting of worsening creatinine but then restarted on 12/13  · Currently on IV Bumex twice daily as noted above  · Sodium level slowly improved    Nephrology input appreciated  · Continue on 1200 mL fluid restriction    COPD (chronic obstructive pulmonary disease) [facility])  Assessment & Plan  On Trelegy, albuterol nebulizer p r n  At home  · Was initially on 2 L oxygen but currently remains on room air  · Substituted Trelegy with Mickiel  and Incruse  · Patient was noted to be wheezing on exam on 12/15  Started on 40 mg of IV Solu-Medrol every 12 hours   · continue scheduled nebulizer treatments      Depression with anxiety  Assessment & Plan  Continue Cymbalta    Anemia  Assessment & Plan  Baseline hemoglobin appears to be around 7-9s  · No evidence of active bleeding  · Received 1 unit PRBC transfusion during this admission  · Stool for occult blood was negative x1  · Hemoglobin overall stable    Results from last 7 days   Lab Units 12/16/22  0544 12/15/22  0523 12/14/22  0457 12/13/22  0519 12/12/22  0442 12/11/22  0643 12/10/22  0611   HEMOGLOBIN g/dL 7 8* 7 8* 7 7* 8 3* 8 3* 8 3* 7 9*   HEMATOCRIT % 26 2* 25 7* 25 4* 27 1* 27 0* 26 5* 25 4*   MCV fL 89 87 88 87 87 85 86         Moderate to severe aortic stenosis  Assessment & Plan  Severe AS on echo in 1/2022  · Underwent MARY showed mild to moderate concentric hypertrophy with EF of 55% with moderately thickened aortic valve with severe aortic valve stenosis    Gastro-esophageal reflux disease without esophagitis  Assessment & Plan  Continue Protonix    Obesity (BMI 30-39  9)  Assessment & Plan  Body mass index is 34 02 kg/m²  · Diet and lifestyle modification    CAD (coronary artery disease)  Assessment & Plan  Status post PCI with stents x 2   · Continue statin, Imdur  · Plavix was discontinued on prior admission      Stage 3a chronic kidney disease [facility])  Assessment & Plan  Lab Results   Component Value Date    EGFR 41 12/16/2022    EGFR 37 12/15/2022    EGFR 32 12/14/2022    CREATININE 1 53 (H) 12/16/2022    CREATININE 1 69 (H) 12/15/2022    CREATININE 1 88 (H) 12/14/2022     Baseline creatinine appears to be around 1 3-1 6s    · Creatinine remains at baseline  · Repeat lab work in AM       JOVI (obstructive sleep apnea)  Assessment & Plan  Continue CPAP at HS  Hyperlipidemia  Assessment & Plan  Continue statin    Chronic a-fib [facility])  Assessment & Plan  Status post pacemaker insertion  · Restart Xarelto as no plans for biopsy      Benign prostatic hyperplasia  Assessment & Plan  Continue Proscar    Benign hypertension with chronic kidney disease, stage III (HCC)  Assessment & Plan  · Holding Demadex  · IV Bumex as noted above      VTE Pharmacologic Prophylaxis:   Xarelto    Patient Centered Rounds: I performed bedside rounds with nursing staff today  Discussions with Specialists or Other Care Team Provider: yes - neuro, renal, ID, oncology    Education and Discussions with Family / Patient: Updated  (wife) at bedside  Also updated daughter over the phone    Time Spent for Care: 45 minutes  More than 50% of total time spent on counseling and coordination of care as described above  Current Length of Stay: 10 day(s)  Current Patient Status: Inpatient   Certification Statement: The patient will continue to require additional inpatient hospital stay due to Volume overload requiring IV Bumex  Discharge Plan: Anticipate discharge in 48-72 hrs to rehab facility  Code Status: Level 1 - Full Code    Subjective:     Patient continues to report some shortness of breath  Coughing up phlegm    Objective:     Vitals:   Temp (24hrs), Av 2 °F (36 2 °C), Min:96 4 °F (35 8 °C), Max:97 9 °F (36 6 °C)    Temp:  [96 4 °F (35 8 °C)-97 9 °F (36 6 °C)] 97 °F (36 1 °C)  HR:  [69-80] 75  Resp:  [18-24] 18  BP: (116-137)/(60-78) 137/78  SpO2:  [90 %-100 %] 95 %  Body mass index is 34 02 kg/m²  Input and Output Summary (last 24 hours): Intake/Output Summary (Last 24 hours) at 2022 1350  Last data filed at 2022 1201  Gross per 24 hour   Intake --   Output 900 ml   Net -900 ml       Physical Exam:   Physical Exam  Vitals reviewed  Constitutional:       General: He is not in acute distress  Appearance: He is ill-appearing  HENT:      Head: Normocephalic and atraumatic  Eyes:      General:         Right eye: No discharge  Left eye: No discharge  Cardiovascular:      Rate and Rhythm: Normal rate and regular rhythm  Heart sounds: Murmur heard  Pulmonary:      Effort: Pulmonary effort is normal  No respiratory distress  Breath sounds: Rales present  No wheezing  Comments: Decreased breath sounds bilaterally  Coarse breath sounds  Abdominal:      General: Bowel sounds are normal  There is no distension  Palpations: Abdomen is soft  Tenderness: There is no abdominal tenderness  Musculoskeletal:      Right lower leg: Edema present  Left lower leg: Edema present  Neurological:      Mental Status: He is alert and oriented to person, place, and time  Additional Data:     Labs:  Results from last 7 days   Lab Units 12/16/22  0544 12/13/22  0519 12/12/22  0442 12/11/22  0643 12/10/22  0611   WBC Thousand/uL 8 96   < > 12 77*   < > 13 62*   HEMOGLOBIN g/dL 7 8*   < > 8 3*   < > 7 9*   HEMATOCRIT % 26 2*   < > 27 0*   < > 25 4*   PLATELETS Thousands/uL 212   < > 227   < > 170   BANDS PCT %  --   --  4  --   --    NEUTROS PCT %  --   --   --   --  85*   LYMPHS PCT %  --   --   --   --  6*   LYMPHO PCT %  --   --  7*  --   --    MONOS PCT %  --   --   --   --  7   MONO PCT %  --   --  5  --   --    EOS PCT %  --   --  0  --  0    < > = values in this interval not displayed       Results from last 7 days   Lab Units 12/16/22  0544 12/15/22  0523   SODIUM mmol/L 135 131*   POTASSIUM mmol/L 4 4 4 1   CHLORIDE mmol/L 97 95*   CO2 mmol/L 30 30   BUN mg/dL 42* 48*   CREATININE mg/dL 1 53* 1 69*   ANION GAP mmol/L 8 6   CALCIUM mg/dL 8 5 8 4   ALBUMIN g/dL  --  1 7*   TOTAL BILIRUBIN mg/dL  --  0 54   ALK PHOS U/L  --  118*   ALT U/L  --  9*   AST U/L  --  46*   GLUCOSE RANDOM mg/dL 130 112         Results from last 7 days   Lab Units 12/15/22  1817   POC GLUCOSE mg/dl 205*               Lines/Drains:  Invasive Devices     Central Venous Catheter Line  Duration           CVC Central Lines 12/12/22 Single Right  3 days                Central Line:  Goal for removal: N/A - Discharging with PICC for IV ABX/medications             Imaging: Reviewed radiology reports from this admission including: CT head    Recent Cultures (last 7 days):         Last 24 Hours Medication List:   Current Facility-Administered Medications   Medication Dose Route Frequency Provider Last Rate   • acetaminophen  650 mg Oral Q6H PRN NAVA Coleman     • albuterol  2 5 mg Nebulization Q4H PRN Lynn Revankar, DO     • albuterol  2 5 mg Nebulization Q4H PRN Lynn Revankar, DO     • allopurinol  200 mg Oral Daily NAVA Coleman     • atorvastatin  40 mg Oral Daily With NAVA French     • bumetanide  1 mg Intravenous BID Kristan Morrison MD     • cefazolin  2,000 mg Intravenous Q8H Chiquis Lim MD 2,000 mg (12/16/22 1329)   • docusate sodium  100 mg Oral BID Ron Ellis MD     • DULoxetine  60 mg Oral BID NAVA Coleman     • finasteride  5 mg Oral Daily NAVA Coleman     • fluticasone-vilanterol  1 puff Inhalation Daily NAVA Coleman     • gabapentin  100 mg Oral HS NAVA Coleman     • isosorbide mononitrate  30 mg Oral Daily NAVA Coleman     • levalbuterol  1 25 mg Nebulization TID Lynn Revankar, DO      And   • sodium chloride  3 mL Nebulization TID Lynn Revankar, DO     • lidocaine  1 patch Topical Daily Shelby Mueller PA-C     • methocarbamol  500 mg Oral Q6H PRN Lynn Revankar, DO     • methylPREDNISolone sodium succinate  40 mg Intravenous Q12H [address] Lynn Revankar, DO     • ondansetron  4 mg Intravenous Q6H PRN NAVA Coleman     • pantoprazole  40 mg Oral Early Morning NAVA Coleman     • polyethylene glycol  17 g Oral Daily PRN Cuiyin Yurik, CRNP     • polyethylene glycol  17 g Oral Daily Chiquis Lim MD • rivaroxaban  15 mg Oral Daily With Breakfast Lynn Montano DO     • saccharomyces boulardii  250 mg Oral BID NAVA Coleman     • senna  1 tablet Oral HS Hanane Rubin MD     • umeclidinium  1 puff Inhalation Daily NAVA Coleman          Today, Patient Was Seen By: Rose Roberts DO    **Please Note: This note may have been constructed using a voice recognition system  **

## 2022-12-16 NOTE — ASSESSMENT & PLAN NOTE
Wt Readings from Last 3 Encounters:   12/07/22 120 kg (265 lb)   11/22/22 121 kg (265 lb 10 5 oz)   10/20/22 120 kg (265 lb)     On Demadex 40 mg p o  Daily at home  · 2D echo in January 2022 showed normal EF, 65%, moderate concentric hypertrophy, dilated atriums, severe AS, mild to moderate AR, moderate MS  · Chest X-ray showed cardiomegaly with moderate congestive changes  · BNP was elevated at 14,000  · Demadex was held initially in the setting of sepsis and then later in the setting of worsening creatinine  Also received dose of Lasix on 12/8 and 12/9 and 12/15  · Restarted on 20 mg of Demadex daily on 12/13    Currently transitioned to IV Bumex twice daily as patient appears volume overloaded

## 2022-12-16 NOTE — PHYSICAL THERAPY NOTE
PT TREATMENT       12/16/22 0910   PT Last Visit   PT Visit Date 12/16/22   Note Type   Note Type Treatment   Pain Assessment   Pain Assessment Tool 0-10   Pain Score 5   Pain Location/Orientation Location: Neck   Pain Onset/Description Onset: Ongoing   Effect of Pain on Daily Activities limits mobility/tolerance to sitting   Patient's Stated Pain Goal No pain   Hospital Pain Intervention(s) Rest;Repositioned   Multiple Pain Sites No   Restrictions/Precautions   Weight Bearing Precautions Per Order No   Other Precautions Bed Alarm; Chair Alarm; Fall Risk;Pain;Cognitive   General   Chart Reviewed Yes   Family/Caregiver Present Yes  (family at bedside)   Cognition   Overall Cognitive Status WFL   Arousal/Participation Cooperative   Attention Attends with cues to redirect   Orientation Level Oriented to person;Oriented to place   Following Commands Follows one step commands with increased time or repetition   Subjective   Subjective "I want to get back in bed"   Bed Mobility   Sit to Supine 2  Maximal assistance   Additional items Assist x 1;Verbal cues   Additional Comments Pt received sitting in bedside chair   Transfers   Sit to Stand 3  Moderate assistance   Additional items Assist x 2;Verbal cues   Stand to Sit 3  Moderate assistance   Additional items Assist x 1;Verbal cues   Stand pivot 4  Minimal assistance   Additional items Assist x 1;Verbal cues   Ambulation/Elevation   Gait pattern Foward flexed; Wide CASSIE; Short stride; Step to   Gait Assistance 4  Minimal assist   Additional items Assist x 1;Verbal cues   Assistive Device Rolling walker   Distance 5 feet from chair to bed   Stair Management Assistance Not tested   Balance   Static Sitting Fair   Dynamic Sitting Fair -   Static Standing Fair -   Dynamic Standing Poor +   Ambulatory Poor +   Activity Tolerance   Activity Tolerance Patient tolerated treatment well;Patient limited by fatigue   Nurse Made Aware yes JOSE Sr Cover   Exercises   Neuro re-ed Pt able to perform exercise in supine including ankle pumps, quad sets, heel slides, hip abd x 5-10 reps bilat   Assessment   Prognosis Good   Problem List Decreased strength;Decreased range of motion;Decreased endurance;Decreased mobility; Impaired balance;Pain; Impaired judgement;Decreased safety awareness;Decreased cognition   Assessment Pt agreeable to PT session this AM  Pt received sitting in bedside chair requesting to return to bed  Able to transfer to bed using RW with Min/Mod A + max A x 1 to return to supine  Pt able to perform exercise as mentioned above with intermittent assist to improve ROM/mobility  The patient's AM-[facility] Basic Mobility Inpatient Short Form Raw Score is 11  A Raw score of less than or equal to 16 suggests the patient may benefit from discharge to post-acute rehabilitation services  Please also refer to the recommendation of the Physical Therapist for safe discharge planning  Goals   Patient Goals to get better   Plan   Treatment/Interventions ADL retraining;Functional transfer training;LE strengthening/ROM; Therapeutic exercise; Endurance training;Bed mobility;Gait training;Spoke to nursing   Progress Slow progress, decreased activity tolerance   PT Frequency Other (Comment)  (5x/week)   Recommendation   PT Discharge Recommendation Post acute rehabilitation services   AM-[facility] Basic Mobility Inpatient   Turning in Bed Without Bedrails 2   Lying on Back to Sitting on Edge of Flat Bed 2   Moving Bed to Chair 2   Standing Up From Chair 2   Walk in Room 2   Climb 3-5 Stairs 1   Basic Mobility Inpatient Raw Score 11   Basic Mobility Standardized Score 30 25   Highest Level Of Mobility   JH-HLM Goal 4: Move to chair/commode   JH-HLM Achieved 4: Move to chair/commode   Education   Education Provided Mobility training;Home exercise program   Patient Explanation/teachback used; Reinforcement needed   End of Consult   Patient Position at End of Consult Supine;Bed/Chair alarm activated; All needs within reach   21 Frida Atkins Number  Danika Allison [id number]

## 2022-12-16 NOTE — ASSESSMENT & PLAN NOTE
Likely acute toxic metabolic encephalopathy due to underlying infection  Reported feeling sleepy and weak  · CT head - Hypodensity in the right parieto-occipital region which could be chronic infarct versus metastatic disease based on the CT of the chest  · Mental status has improved with treatment of sepsis  · ABG no hypercapnia  · Patient was initially worked up for possible stroke  · Patient could not get MRI of the brain due to incompatible pacemaker  · Was given full dose aspirin in the ED along with Lipitor 80 mg  · Hemoglobin A1c was 5 1  · Repeat CT head 12/12 which shows persistent small patchy hypodensities concerning for vasogenic edema related to metastatic foci    · PT/OT evaluation and treatment

## 2022-12-16 NOTE — ASSESSMENT & PLAN NOTE
Lab Results   Component Value Date    EGFR 41 12/16/2022    EGFR 37 12/15/2022    EGFR 32 12/14/2022    CREATININE 1 53 (H) 12/16/2022    CREATININE 1 69 (H) 12/15/2022    CREATININE 1 88 (H) 12/14/2022     Baseline creatinine appears to be around 1 3-1 6s    · Creatinine remains at baseline  · Repeat lab work in AM

## 2022-12-16 NOTE — ASSESSMENT & PLAN NOTE
CT chest - numerous new pulmonary nodules concerning for metastatic disease along with mediastinal and hilar lymphadenopathy and small right pleural effusion  · Patient had CT abdomen pelvis without contrast on 11/19/2022 which was unremarkable  · CT of the brain also showing hypodensity in the right parieto-occipital region - 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  Repeat CT head on 12/12 showed persistent hypodensities concerning for metastatic foci  · Discussed with neurology who was concerned for metastatic lesion on CT brain  Oncology was consulted  · CT with IV contrast showed that 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  · Patient did have a colonoscopy during prior hospitalization which showed no masses  · No history of malignancy as per family  · Pulmonary recommended IR guided lung biopsy  Lung nodules could also be related to septic emboli  Per ID, will need repeat CT scan in 3 months  If the nodules are improving patient might not need further work-up but if the nodules are persistent might need biopsy    Family in agreement with this plan

## 2022-12-16 NOTE — SPEECH THERAPY NOTE
Speech-Language Pathology Bedside Swallow Evaluation      Patient Name: Anastasiia CHING Date: 12/16/2022     Problem List  Principal Problem:    MSSA bacteremia  Active Problems:    Benign hypertension with chronic kidney disease, stage III (HCC)    Benign prostatic hyperplasia    Chronic a-fib ([facility])    Hyperlipidemia    JOVI (obstructive sleep apnea)    Hyponatremia    Stage 3a chronic kidney disease (HCC)    CAD (coronary artery disease)    Obesity (BMI 30-39  9)    COPD (chronic obstructive pulmonary disease) ([facility])    Chronic diastolic heart failure ([facility])    Gastro-esophageal reflux disease without esophagitis    Moderate to severe aortic stenosis    Elevated troponin    Anemia    Depression with anxiety    Acute encephalopathy    Abnormal CAT scan      Past Medical History  Past Medical History:   Diagnosis Date   • Arthritis    • Atrial flutter ([facility])    • Chronic kidney disease     stage 3   • Coronary artery disease     2 stents   • Fluid retention    • Gout    • Heart disease    • Heart failure ([facility])     pacemaker   • Hypertension    • Hyponatremia 04/08/2019   • Neurological disorder    • Pacemaker    • Pulmonary emphysema ([facility] [address] )    • Radiculopathy     last assessed 1/28/16    • Shortness of breath     exertion   • Sleep apnea     c pap       Past Surgical History  Past Surgical History:   Procedure Laterality Date   • ANGIOPLASTY      x2 2 stents and then replaced   • CARDIAC PACEMAKER PLACEMENT      pacemaker permanent placement dual chamber / last assessed 4/7/14 / implantation    • CARDIAC SURGERY      pacemaker   • CHOLECYSTECTOMY     • CORONARY ANGIOPLASTY WITH STENT PLACEMENT     • EPIDURAL BLOCK INJECTION N/A 05/26/2016    Procedure: BLOCK / INJECTION EPIDURAL STEROID LUMBAR  L4-5;  Surgeon: Ana Simmons MD;  Location: [facility] MAIN OR;  Service:    • EPIDURAL BLOCK INJECTION N/A 02/14/2019    Procedure: L4 L5 Lumbar Epidural Steroid Injection;  Surgeon: Ana Simmons MD;  Location: [facility] MAIN OR;  Service: Pain Management    • EYE SURGERY      cataract left   • HAND SURGERY     • HIP PINNING Left    • INSERT / REPLACE / REMOVE PACEMAKER     • IR TUNNELED CENTRAL LINE PLACEMENT  12/12/2022   • KNEE ARTHROSCOPY W/ MENISCAL REPAIR Left    • KNEE SURGERY     • LUMBAR EPIDURAL INJECTION N/A 03/17/2016    Procedure: BLOCK / INJECTION LUMBAR  L4-5  (C-ARM); Surgeon: Bhanu Cavazos MD;  Location: [facility] MAIN OR;  Service:    • AR OPEN RX FEMUR FX+INTRAMED ELLA Right 01/31/2022    Procedure: INSERTION NAIL IM FEMUR ANTEGRADE (TROCHANTERIC); Surgeon: Sumit Watson MD;  Location: [facility] Main OR;  Service: Orthopedics       Summary   Pt presented s/p an episode of vomiting yesterday and with c/o difficulty eating yesterday per daughter  This am, he presented as WEAK appearing with inability to sustain alertness/ with decline in MS since time of initial evaluation  +Functional appearing oral and pharyngeal stage swallowing skills with puree, mechancial soft and liquids but daughter reported lengthy inefficient oral process with solid food (I asked about same given decline in MS)  Recommended Diet: mechanically altered/level 2 diet and thin liquids   Recommended Form of Meds: as tolerated (took multiple whole medications with nectar and 1 medication whole w/thin with no s/s dysphagia)  Note: when 2 small medications where placed in applesauce to begin, pt chewed medication)   Aspiration precautions and swallowing strategies: upright posture, only feed when fully alert and slow rate of feeding  Other Recommendations: Continue frequent oral care        Current Medical Status  Pt is a 80 y o  male with a PMH of PPM, COPD, CHF, CKD and smoking admitted 12/5 w/ lethargy and weakness associated with subjective fevers  The patient was found to have gram positive cocci in on blood cultures and has been empirically treated with abx with ID guidance   Workup include a CT chest showing bilateral pulmonary nodules which were not present compared to a CT a year ago  He also had a CTH as part of his encephaloapthy workup showing a hypodensity in the R parietooccipital region  Initial swallowing evaluation suggested no significant oropharyngeal dysphagia (but rapid rate of intake reported (and caregivers have been supervising intake)  Yesterday, he c/o difficulty swallowing and evidenced vomiting at the end of the meal  SLP Swallowing Re-Evaluation ordered at this time  Crrent Precautions:  Fall & Aspiration (is supervised w/meals and family has been cutting food very well as pt reportedly presenting with increased lethargy and prolonged mastication)  Allergies:  No known food allergies    Past medical history:  Please see H&P for details    Special Studies:  12/15 CXR: Interstitial pulmonary edema with bilateral pleural effusions and bibasilar atelectasis  Cardiomegaly      Social/Education/Vocational Hx:  Pt lives with family    Swallow Information   Current Risks for Dysphagia & Aspiration: AMS and general debility (appeared quite weak today and unable to remain alert without persistent stimulation) by end of meal)  Current Diet: regular diet and thin liquids 2gm NA, FR  Baseline Diet: regular diet and thin liquids 2gm NA      Baseline Assessment   Behavior/Cognition: lethargic and waxing and waning arousal level  Speech/Language Status: able to follow commands and limited verbal output of variable intelligibility (dependet on NELLY)  Patient Positioning: upright in chair  Pain Status/Interventions/Response to Interventions:  No report of or nonverbal indications of pain         Swallow Mechanism Exam  Facial: symmetrical  Labial: WFL  Lingual: WFL when effort applied  Velum: symmetrical  Mandible: adequate ROM  Dentition: adequate  Vocal quality:varied w/effort   Respiratory Status: on RA     Consistencies Assessed and Performance   Materials administered included nectar thick and thin liquid, puree, fig kim (alone and mixed in puree)    Oral Stage: mild-moderate  Mastication was prolonged with soft/solid food  Bolus formation and transfer were functional with no significant oral residue noted c puree/mech soft  No overt s/s reduced oral control with food or liquid but presume some risk due to variable NELLY  Chewed pills when placed in puree (appropriate transfer w/pill with NTL and thin liqudi)    Pharyngeal Stage:   Swallow Mechanics:  Swallowing initiation appeared prompt  Laryngeal rise was palpated and judged to be within functional limits  No coughing, throat clearing, change in vocal quality or respiratory status noted today  Esophageal Concerns: recent vomiting x1  Spoke with dtr re: concern for slowed esopahgeal transit given recent vomiting and pt c/o difficulty swallowing by meals end yesterday  Strategies and Efficacy: was fully upright and fed slowly    Summary and Recommendations (see above)    Results Reviewed with: patient, RN, MD and family/dtr    Treatment Recommended: yes     Frequency of treatment: min of 3x weekly    Patient Stated Goal: unable to state    Dysphagia LTG  -Patient will demonstrate safe and effective oral intake (without overt s/s significant oral/pharyngeal dysphagia including s/s penetration or aspiration) for the highest appropriate diet level  Short Term Goals:  -Pt will tolerate Dysphagia 2/mechanical soft diet and honey thick nectar thick thin liquid with no significant s/s oral or pharyngeal dysphagia across 1-3 diagnostic session/s     -Patient will tolerate trials of upgraded food texture with no significant s/s of oral or pharyngeal dysphagia including aspiration across 1-3 diagnostic sessions     -Caregivers will use safe feeding strategies (eg full upright positioning during and after meals, slow rate of intake (note: education completed c dtr today)  Thank you for this referral   Please do not hesitate to contact me with any questions or concerns      [facility] [address] [id number]  [place] License [id number]  Available via [facility] Text

## 2022-12-17 LAB
ANION GAP SERPL CALCULATED.3IONS-SCNC: 4 MMOL/L (ref 4–13)
BUN SERPL-MCNC: 41 MG/DL (ref 5–25)
CALCIUM SERPL-MCNC: 8.4 MG/DL (ref 8.3–10.1)
CHLORIDE SERPL-SCNC: 97 MMOL/L (ref 96–108)
CO2 SERPL-SCNC: 33 MMOL/L (ref 21–32)
CREAT SERPL-MCNC: 1.44 MG/DL (ref 0.6–1.3)
GFR SERPL CREATININE-BSD FRML MDRD: 44 ML/MIN/1.73SQ M
GLUCOSE SERPL-MCNC: 152 MG/DL (ref 65–140)
HCT VFR BLD AUTO: 26.5 % (ref 36.5–49.3)
HGB BLD-MCNC: 7.7 G/DL (ref 12–17)
POTASSIUM SERPL-SCNC: 4.3 MMOL/L (ref 3.5–5.3)
SODIUM SERPL-SCNC: 134 MMOL/L (ref 135–147)

## 2022-12-17 RX ORDER — ALPRAZOLAM 0.5 MG/1
0.5 TABLET ORAL ONCE
Status: COMPLETED | OUTPATIENT
Start: 2022-12-17 | End: 2022-12-17

## 2022-12-17 RX ADMIN — ALLOPURINOL 200 MG: 100 TABLET ORAL at 08:02

## 2022-12-17 RX ADMIN — ISODIUM CHLORIDE 3 ML: 0.03 SOLUTION RESPIRATORY (INHALATION) at 14:21

## 2022-12-17 RX ADMIN — LEVALBUTEROL HYDROCHLORIDE 1.25 MG: 1.25 SOLUTION, CONCENTRATE RESPIRATORY (INHALATION) at 07:22

## 2022-12-17 RX ADMIN — LEVALBUTEROL HYDROCHLORIDE 1.25 MG: 1.25 SOLUTION, CONCENTRATE RESPIRATORY (INHALATION) at 20:31

## 2022-12-17 RX ADMIN — ISOSORBIDE MONONITRATE 30 MG: 30 TABLET, EXTENDED RELEASE ORAL at 08:02

## 2022-12-17 RX ADMIN — UMECLIDINIUM 1 PUFF: 62.5 AEROSOL, POWDER ORAL at 08:04

## 2022-12-17 RX ADMIN — ALPRAZOLAM 0.5 MG: 0.5 TABLET ORAL at 17:39

## 2022-12-17 RX ADMIN — METHYLPREDNISOLONE SODIUM SUCCINATE 40 MG: 40 INJECTION, POWDER, FOR SOLUTION INTRAMUSCULAR; INTRAVENOUS at 08:02

## 2022-12-17 RX ADMIN — METHOCARBAMOL 500 MG: 500 TABLET ORAL at 08:13

## 2022-12-17 RX ADMIN — DULOXETINE HYDROCHLORIDE 60 MG: 60 CAPSULE, DELAYED RELEASE ORAL at 17:40

## 2022-12-17 RX ADMIN — LEVALBUTEROL HYDROCHLORIDE 1.25 MG: 1.25 SOLUTION, CONCENTRATE RESPIRATORY (INHALATION) at 14:21

## 2022-12-17 RX ADMIN — BUMETANIDE 1 MG: 0.25 INJECTION INTRAMUSCULAR; INTRAVENOUS at 17:40

## 2022-12-17 RX ADMIN — SENNOSIDES 8.6 MG: 8.6 TABLET, FILM COATED ORAL at 23:04

## 2022-12-17 RX ADMIN — LIDOCAINE 5% 1 PATCH: 700 PATCH TOPICAL at 08:03

## 2022-12-17 RX ADMIN — DOCUSATE SODIUM 100 MG: 100 CAPSULE, LIQUID FILLED ORAL at 23:04

## 2022-12-17 RX ADMIN — DULOXETINE HYDROCHLORIDE 60 MG: 60 CAPSULE, DELAYED RELEASE ORAL at 08:03

## 2022-12-17 RX ADMIN — CEFAZOLIN SODIUM 2000 MG: 2 SOLUTION INTRAVENOUS at 23:04

## 2022-12-17 RX ADMIN — PANTOPRAZOLE SODIUM 40 MG: 40 TABLET, DELAYED RELEASE ORAL at 05:42

## 2022-12-17 RX ADMIN — CEFAZOLIN SODIUM 2000 MG: 2 SOLUTION INTRAVENOUS at 15:00

## 2022-12-17 RX ADMIN — Medication 250 MG: at 08:02

## 2022-12-17 RX ADMIN — Medication 250 MG: at 17:40

## 2022-12-17 RX ADMIN — METHYLPREDNISOLONE SODIUM SUCCINATE 40 MG: 40 INJECTION, POWDER, FOR SOLUTION INTRAMUSCULAR; INTRAVENOUS at 23:04

## 2022-12-17 RX ADMIN — RIVAROXABAN 15 MG: 15 TABLET, FILM COATED ORAL at 08:02

## 2022-12-17 RX ADMIN — FINASTERIDE 5 MG: 5 TABLET, FILM COATED ORAL at 08:02

## 2022-12-17 RX ADMIN — POLYETHYLENE GLYCOL 3350 17 G: 17 POWDER, FOR SOLUTION ORAL at 08:02

## 2022-12-17 RX ADMIN — ATORVASTATIN CALCIUM 40 MG: 40 TABLET, FILM COATED ORAL at 16:45

## 2022-12-17 RX ADMIN — CEFAZOLIN SODIUM 2000 MG: 2 SOLUTION INTRAVENOUS at 05:43

## 2022-12-17 RX ADMIN — BUMETANIDE 1 MG: 0.25 INJECTION INTRAMUSCULAR; INTRAVENOUS at 08:02

## 2022-12-17 RX ADMIN — ISODIUM CHLORIDE 3 ML: 0.03 SOLUTION RESPIRATORY (INHALATION) at 20:31

## 2022-12-17 RX ADMIN — FLUTICASONE FUROATE AND VILANTEROL TRIFENATATE 1 PUFF: 200; 25 POWDER RESPIRATORY (INHALATION) at 08:03

## 2022-12-17 RX ADMIN — ISODIUM CHLORIDE 3 ML: 0.03 SOLUTION RESPIRATORY (INHALATION) at 07:22

## 2022-12-17 RX ADMIN — GABAPENTIN 100 MG: 100 CAPSULE ORAL at 23:04

## 2022-12-17 RX ADMIN — DOCUSATE SODIUM 100 MG: 100 CAPSULE, LIQUID FILLED ORAL at 08:03

## 2022-12-17 NOTE — ASSESSMENT & PLAN NOTE
Lab Results   Component Value Date    EGFR 44 12/17/2022    EGFR 41 12/16/2022    EGFR 37 12/15/2022    CREATININE 1 44 (H) 12/17/2022    CREATININE 1 53 (H) 12/16/2022    CREATININE 1 69 (H) 12/15/2022     Baseline creatinine appears to be around 1 3-1 6s    · Creatinine remains at baseline  · Repeat lab work in AM

## 2022-12-17 NOTE — ASSESSMENT & PLAN NOTE
Baseline hemoglobin appears to be around 7-9s  · No evidence of active bleeding     · Received 1 unit PRBC transfusion during this admission  · Stool occult blood was negative x1  · Hemoglobin overall stable    Results from last 7 days   Lab Units 12/17/22  0457 12/16/22  0544 12/15/22  0523 12/14/22  0457 12/13/22  0519 12/12/22  0442 12/11/22  0643   HEMOGLOBIN g/dL 7 7* 7 8* 7 8* 7 7* 8 3* 8 3* 8 3*   HEMATOCRIT % 26 5* 26 2* 25 7* 25 4* 27 1* 27 0* 26 5*   MCV fL  --  89 87 88 87 87 85

## 2022-12-17 NOTE — ASSESSMENT & PLAN NOTE
Wt Readings from Last 3 Encounters:   12/07/22 120 kg (265 lb)   11/22/22 121 kg (265 lb 10 5 oz)   10/20/22 120 kg (265 lb)     On Demadex 40 mg p o  Daily at home  · 2D echo in January 2022 showed normal EF, 65%, moderate concentric hypertrophy, dilated atriums, severe AS, mild to moderate AR, moderate MS  · Chest X-ray showed cardiomegaly with moderate congestive changes  · BNP was elevated at 14,000  · Demadex was held initially in the setting of sepsis and then later in the setting of worsening creatinine  Received dose of Lasix on 12/8 and 12/9 and 12/15  · Restarted on 20 mg of Demadex daily on 12/13    Currently on IV Bumex twice daily as patient appears volume overloaded

## 2022-12-17 NOTE — PROGRESS NOTES
NEPHROLOGY PROGRESS NOTE   Melissa Fernandez 80 y o  male MRN: [id number]  Unit/Bed#: [address] [place] [address] Encounter: [id number]  Reason for Consult: hyponatremia     ASSESSMENT AND PLAN:  80 y  o  man with PMH of COPD, CAD status post PCI, CHF, BPH, GERD, arctic stenosis, A  fib, p/w lethargy and weakness   [facility] is consulted for management of hyponatremia      PLAN     #Hyposomolar Hyponatremia   • Sodium on admission: 133 mEq/L   • Serum osm: 285   • Urine osm: 326 (elevated) reflects presence of ADH  • Urine Sodium: 7  • Current sodium: 135 mEq/L corrected with glucose, stable  • Etiology: Multifactorial likely secondary to SIADH in the settings of pulmonary nodules, possible malignancy, Cymbalta and component of fluid overload   • Rate of correction: Appropriate correction  • Plan:  • Restriction 1 2 L   • BMP Q 24      #Volume status/blood pressure:  • Volume: Fluid overload, improving  • Blood pressure: Normotensive /62mmHg, goal<140/90  • Recommend:  • Continue Bumex 1 mg twice daily IV for now   • Check albumin with next set of labs, last albumin 1 5 mg/dL          #CKD G3b  • Baseline creatinine: 1 5 to 1 8 mg/dL  • Current creatinine: 1 4 mg/dL, remains at baseline  • Etiology: Secondary to nephrosclerosis in the settings of vascular disease  • UA: No hematuria, no leukocyturia  • Plan:  • No indication of urgent dialysis at this time  • Maintain mean arterial pressure above 65 mmHg to avoid hypotension/hypoperfusion  • Avoid nephrotoxic agents such as NSAIDs and contrast if at all possible    • Avoid opioids   • Adjust medications to GFR  • Renal diet      #Acid base disorder  • serum ZEU5 00 BTFX/N   • Metabolic alkalosis likely secondary to muscular contraction in the settings of diuretics     #CKD-MBD/hypercalcemia  • Calcium 8 4  mg/dL  • Possible secondary to malignancy     #Secondary Hyperparathyroidism   · iPTH  77 4     #Anemia:  • Current hemoglobin: 7 7 mg/dL  • Treatment:  • Transfuse for hemoglobin less than 7 0 per primary service       #AMS  • CT head with chronic findings  • Neurology following     #Pulmonary nodules  • malignancy versus infection      #MSSA bacteremia  • On cefazolin  • Follow-up by ID       SUBJECTIVE:  Patient seen and examined at bedside  Complains of shortness of breath but better than yesterday, nausea, vomiting, abdominal pain or diarrhea         OBJECTIVE:  Current Weight: Weight - Scale: 120 kg (265 lb)  Vitals:    12/17/22 0223   BP:    Pulse:    Resp:    Temp:    SpO2: 98%       Intake/Output Summary (Last 24 hours) at 12/17/2022 0658  Last data filed at 12/16/2022 1801  Gross per 24 hour   Intake --   Output 400 ml   Net -400 ml     Wt Readings from Last 3 Encounters:   12/07/22 120 kg (265 lb)   11/22/22 121 kg (265 lb 10 5 oz)   10/20/22 120 kg (265 lb)     Temp Readings from Last 3 Encounters:   12/16/22 (!) 97 4 °F (36 3 °C)   11/22/22 97 6 °F (36 4 °C)   10/05/22 (!) 97 4 °F (36 3 °C)     BP Readings from Last 3 Encounters:   12/16/22 131/62   11/22/22 119/64   10/20/22 130/70     Pulse Readings from Last 3 Encounters:   12/16/22 74   11/22/22 70   10/20/22 86        General:, no acute distress at this time  Skin:  No acute rash  Eyes:  No scleral icterus and noninjected  ENT:  mucous membranes moist  Neck:  no carotid bruits  Chest:  Bilateral crackles, good respiratory effort, no use of accessory respiratory muscles  CVS:  Regular rate and rhythm without rub  Abdomen:  soft and nontender   Extremities:  LE edema 1+  Neuro:  No gross focality  Psych:  Alert , cooperative       Medications:    Current Facility-Administered Medications:   •  acetaminophen (TYLENOL) tablet 650 mg, 650 mg, Oral, Q6H PRN, NAVA Coleman, 650 mg at 12/08/22 1531  •  albuterol inhalation solution 2 5 mg, 2 5 mg, Nebulization, Q4H PRN, Lynn Revjonesar, DO, 2 5 mg at 12/15/22 1845  •  albuterol inhalation solution 2 5 mg, 2 5 mg, Nebulization, Q4H PRN, Lynn Revjonesar, DO  • allopurinol (ZYLOPRIM) tablet 200 mg, 200 mg, Oral, Daily, Cuibarbara Hanksrik, CRNP, 200 mg at 12/16/22 1169  •  atorvastatin (LIPITOR) tablet 40 mg, 40 mg, Oral, Daily With Dinner, Cuibarbara Hanksrik, CRNP, 40 mg at 12/16/22 1643  •  bumetanide (BUMEX) injection 1 mg, 1 mg, Intravenous, BID, Salvador William MD, 1 mg at 12/16/22 1801  •  ceFAZolin (ANCEF) IVPB (premix in dextrose) 2,000 mg 50 mL, 2,000 mg, Intravenous, Q8H, Raffy Ellis MD, Last Rate: 100 mL/hr at 12/17/22 0543, 2,000 mg at 12/17/22 0543  •  docusate sodium (COLACE) capsule 100 mg, 100 mg, Oral, BID, Raffy Ellis MD, 100 mg at 12/16/22 2250  •  DULoxetine (CYMBALTA) delayed release capsule 60 mg, 60 mg, Oral, BID, Kel Arce, CRNP, 60 mg at 12/16/22 1801  •  finasteride (PROSCAR) tablet 5 mg, 5 mg, Oral, Daily, Kel Hanksrirajendra, CRNP, 5 mg at 12/16/22 0843  •  fluticasone-vilanterol 200-25 mcg/actuation 1 puff, 1 puff, Inhalation, Daily, Kel Arce, CRNP, 1 puff at 12/16/22 0844  •  gabapentin (NEURONTIN) capsule 100 mg, 100 mg, Oral, HS, Cucristal Hanksrik, CRNP, 100 mg at 12/16/22 2246  •  isosorbide mononitrate (IMDUR) 24 hr tablet 30 mg, 30 mg, Oral, Daily, Kel Hanksrik, CRNP, 30 mg at 12/16/22 0843  •  levalbuterol (Walter [place]) inhalation solution 1 25 mg, 1 25 mg, Nebulization, TID, 1 25 mg at 12/16/22 2038 **AND** sodium chloride 0 9 % inhalation solution 3 mL, 3 mL, Nebulization, TID, Lynn Velezar, DO, 3 mL at 12/16/22 2038  •  lidocaine (LIDODERM) 5 % patch 1 patch, 1 patch, Topical, Daily, Shelby Mueller PA-C, 1 patch at 12/16/22 0841  •  methocarbamol (ROBAXIN) tablet 500 mg, 500 mg, Oral, Q6H PRN, Lynn Velezar, DO, 500 mg at 12/16/22 0859  •  methylPREDNISolone sodium succinate (Solu-MEDROL) injection 40 mg, 40 mg, Intravenous, Q12H JOSE, yLnn Perryankar, DO, 40 mg at 12/16/22 2246  •  ondansetron (ZOFRAN) injection 4 mg, 4 mg, Intravenous, Q6H PRN, NAVA Coleman, 4 mg at 12/13/22 1150  •  pantoprazole (PROTONIX) EC tablet 40 mg, 40 mg, Oral, Early Morning, NAVA Coleman, 40 mg at 12/17/22 0542  •  polyethylene glycol (MIRALAX) packet 17 g, 17 g, Oral, Daily PRN, NAVA Coleman  •  polyethylene glycol (MIRALAX) packet 17 g, 17 g, Oral, Daily, Arlette Pedro MD, 17 g at 12/14/22 0857  •  rivaroxaban (XARELTO) tablet 15 mg, 15 mg, Oral, Daily With Breakfast, Lynn Montano DO, 15 mg at 12/16/22 1329  •  saccharomyces boulardii (FLORASTOR) capsule 250 mg, 250 mg, Oral, BID, NAVA Coleman, 250 mg at 12/16/22 1801  •  senna (SENOKOT) tablet 8 6 mg, 1 tablet, Oral, HS, Arlette Pedro MD, 8 6 mg at 12/16/22 2246  •  umeclidinium 62 5 mcg/actuation inhaler AEPB 1 puff, 1 puff, Inhalation, Daily, NAVA Coleman, 1 puff at 12/16/22 0844    Laboratory Results:  Results from last 7 days   Lab Units 12/17/22  0457 12/16/22  0544 12/15/22  0523 12/14/22  0457 12/13/22  1559 12/13/22  0519 12/12/22  0442 12/11/22  0643   WBC Thousand/uL  --  8 96 7 55 8 98  --  11 59* 12 77* 14 68*   HEMOGLOBIN g/dL 7 7* 7 8* 7 8* 7 7*  --  8 3* 8 3* 8 3*   HEMATOCRIT % 26 5* 26 2* 25 7* 25 4*  --  27 1* 27 0* 26 5*   PLATELETS Thousands/uL  --  212 210 232  --  249 227 194   SODIUM mmol/L 134* 135 131* 129* 127* 128* 130* 134*   POTASSIUM mmol/L 4 3 4 4 4 1 4 2 4 3 4 4 4 3 4 4   CHLORIDE mmol/L 97 97 95* 91* 92* 92* 93* 97   CO2 mmol/L 33* 30 30 28 29 26 27 27   BUN mg/dL 41* 42* 48* 50* 54* 48* 43* 41*   CREATININE mg/dL 1 44* 1 53* 1 69* 1 88* 1 97* 1 86* 1 81* 1 74*   CALCIUM mg/dL 8 4 8 5 8 4 8 6 8 7 8 8 8 8 8 5   MAGNESIUM mg/dL  --  2 2 2 2  --   --   --   --   --        XR chest portable   Final Result by Jeanmarie Carlos MD (12/15 1946)      Interstitial pulmonary edema with bilateral pleural effusions and bibasilar atelectasis  Cardiomegaly                    Workstation performed: [id number]         CT head w wo contrast   Final Result by Randy Torres MD (12/15 9223)      Subcortical hypodensities reported on recent CT examination do not demonstrate enhancement to suggest abscess  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                     Workstation performed: [id number]         IR tunneled central line placement   Final Result by Anibal Pinto MD (12/13 6085)      Insertion of right-sided dual-lumen tunneled small bore central venous catheter, with tip in the expected location of the cavoatrial junction  Plan:       The catheter may be used immediately  Workstation performed: [id number]         CT spine cervical wo contrast   Final Result by Randa Maldonado MD (12/12 1600)   1  The cervical vertebral body heights are maintained demonstrating no acute fracture or subluxation  2  Multilevel cervical degenerative disc disease similar to 11/13/2018  3  New focal nodular lesion within the anterior right lung apex  Please refer to the CT chest examination of 12/6/2022 for further details  Small bilateral pleural effusions, new on the left  Workstation performed: [id number]         CT head wo contrast   Final Result by Randa Maldonado MD (12/12 1541)      1  Persistent small patchy subcortical white matter hypodensities involving the high right frontoparietal, right lateral frontal and right posterior parietal regions concerning for vasogenic edema related to metastatic foci  MRI of the brain with    contrast is recommended for further evaluation  2   Mild chronic microtraumatic ischemic changes  The study was marked in EPIC for significant notification                 Workstation performed: [id number]         XR abdomen obstruction series   Final Result by Raiza Chavira MD (12/12 1031)      No evidence of small bowel obstruction or free air   Moderate amount of colonic stool         Workstation performed: [id number]         CT head without contrast   Final Result by Re Allen MD (12/06 2966)   Addendum (preliminary) 1 of 1 by Re Allen MD (12/06 9769)   ADDENDUM:      In light of the concurrent chest CT findings, the right parieto-occipital    abnormality may also represent a metastasis      Final      Hypodensity in the right parieto-occipital region most likely represents a chronic infarct, however no prior study is available to confirm this      The study was marked in [facility] for immediate notification  Workstation performed: [id number]         CT chest without contrast   Final Result by Alex Son MD (12/06 0404)      1  Numerous new pulmonary nodules, concerning for metastatic disease   2  Mediastinal and hilar lymphadenopathy, also concerning for metastases   3  Small right pleural effusion               Workstation performed: [id number]         XR chest 1 view portable   Final Result by Santos Billings MD (12/06 1025)      Prominent pulmonary vasculature and interstitial markings suggesting moderate congestive changes, stable  Workstation performed: [id number]             Portions of the record may have been created with voice recognition software  Occasional wrong word or "sound a like" substitutions may have occurred due to the inherent limitations of voice recognition software  Read the chart carefully and recognize, using context, where substitutions have occurred

## 2022-12-17 NOTE — PROGRESS NOTES
M[facility] 45  Progress Note Gianni Jeffrey 1940, 80 y o  male MRN: [id number]  Unit/Bed#: 2 [place] [address] Encounter: [id number]  Primary Care Provider: Bayron Patel,    Date and time admitted to hospital: 12/5/2022  6:39 PM    * MSSA bacteremia  Assessment & Plan  Patient presented with somnolence for 24 hours and generalized weakness  Reported a little cough and runny nose, denies sore throat/SOB/fever/chills  · Patient hospitalized in Nov '22 for symptomatic anemia, hemoglobin 7 1 on admission with positive stool guaiac, received 1 unit of PRBC, underwent EGD and colonoscopy both showed no evidence of bleeding  Outpatient capsule endoscopy was recommended which is still pending   Patient remained on Xarelto  · WBC 15 38, no bands, patient afebrile initially  · Lactic acid normal   Procalcitonin elevated  · Chest x-ray showed no evidence of pneumonia  · UA no evidence of infection  COVID-19, flu RSV negative  · CT chest showed -  Numerous new pulmonary nodules, concerning for mets; Mediastinal and hilar lymphadenopathy, concerning for metastases; Small right pleural effusion with adjacent basal opacity, most likely represents atelectasis  Nodules could possibly be septic emboli  · Patient was given Zosyn in the ED, de-escalated to Rocephin  Currently on Ancef 2 g every 8 hours  Plan for 6 weeks course  · Blood cultures 2 out of 2 growing Methicillin sensitive Staphylococcus aureus     · ID input appreciated  · Echocardiogram showed EF of 60% with severe concentric hypertrophy, EF of 60% without any visible vegetations with severe attic stenosis  · Status post MARY which showed EF of 55% without any large vegetation but small vegetation could not be ruled out due to calcium and acoustic shadowing  · Unclear source-possible from skin translocation as patient noted to have multiple scabs  · Pacemaker removal was recommended by ID  per cardiology, pacemaker has been in place for 12 years and removal would be an extensive surgical procedure  Given multiple comorbidities cardiology recommended conservative approach and treatment with IV antibiotics  · Repeat blood cultures from 12/7/2022 negative    Acute encephalopathy  Assessment & Plan  Likely acute toxic metabolic encephalopathy due to underlying infection  Reported feeling sleepy and weak  · CT head - Hypodensity in the right parieto-occipital region which could be chronic infarct versus metastatic disease based on the CT of the chest  · Mental status has improved with treatment of sepsis  · ABG no hypercapnia  · Patient was initially worked up for possible stroke  · Patient could not get MRI of the brain due to incompatible pacemaker  · Was given full dose aspirin in the ED along with Lipitor 80 mg  · Hemoglobin A1c was 5 1  · Repeat CT head 12/12 which shows persistent small patchy hypodensities concerning for vasogenic edema related to metastatic foci  · PT/OT evaluation and treatment    Chronic diastolic heart failure [facility])  Assessment & Plan  Wt Readings from Last 3 Encounters:   12/07/22 120 kg (265 lb)   11/22/22 121 kg (265 lb 10 5 oz)   10/20/22 120 kg (265 lb)     On Demadex 40 mg p o  Daily at home  · 2D echo in January 2022 showed normal EF, 65%, moderate concentric hypertrophy, dilated atriums, severe AS, mild to moderate AR, moderate MS  · Chest X-ray showed cardiomegaly with moderate congestive changes  · BNP was elevated at 14,000  · Demadex was held initially in the setting of sepsis and then later in the setting of worsening creatinine  Received dose of Lasix on 12/8 and 12/9 and 12/15  · Restarted on 20 mg of Demadex daily on 12/13    Currently on IV Bumex twice daily as patient appears volume overloaded    Abnormal CAT scan  Assessment & Plan  CT chest - numerous new pulmonary nodules concerning for metastatic disease along with mediastinal and hilar lymphadenopathy and small right pleural effusion  · Patient had CT abdomen pelvis without contrast on 11/19/2022 which was unremarkable  · CT of the brain also showing hypodensity in the right parieto-occipital region - 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  Repeat CT head on 12/12 showed persistent hypodensities concerning for metastatic foci  · Discussed with neurology who was concerned for metastatic lesion on CT brain  Oncology was consulted  · CT with IV contrast showed that 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  · Patient did have a colonoscopy during prior hospitalization which showed no masses  · No history of malignancy as per family  · Pulmonary recommended IR guided lung biopsy  Lung nodules could also be related to septic emboli  Per ID, will need repeat CT scan in 3 months  If the nodules are improving patient might not need further work-up but if the nodules are persistent might need biopsy  Family in agreement with this plan      Elevated troponin  Assessment & Plan  Mild troponin elevation most likely non MI troponin elevation secondary to sepsis  · EKG showed paced rhythm per prior provider  · Patient also complained of some chest pain on 12/8/22 and repeat troponin remains around 700  · Repeat EKG showed some inferolateral T wave inversion per prior provider  · Suspected secondary to symptomatic anemia  Symptoms improved after 1 unit of PRBC transfusion      Hyponatremia  Assessment & Plan  Results from last 7 days   Lab Units 12/17/22  0457 12/16/22  0544 12/15/22  0523 12/14/22  0457 12/13/22  1559 12/13/22  0519 12/12/22  0442 12/11/22  0643   SODIUM mmol/L 134* 135 131* 129* 127* 128* 130* 134*     In the setting of volume overload  · Demadex was held in the setting of worsening creatinine but then restarted on 12/13  · Currently on IV Bumex twice daily as noted above  · Sodium level slowly has improved    Nephrology input appreciated  · Continue on 1200 mL fluid restriction    COPD (chronic obstructive pulmonary disease) ([facility] [address] )  Assessment & Plan  On Trelegy, albuterol nebulizer p r n  At home  · Was initially on 2 L oxygen but currently remains on room air  · Substituted Trelegy with Doc Human and Incruse  · Patient was noted to be wheezing on exam on 12/15  Currently on 40 mg of IV Solu-Medrol every 12 hours   · continue scheduled nebulizer treatments      Depression with anxiety  Assessment & Plan  Continue Cymbalta    Anemia  Assessment & Plan  Baseline hemoglobin appears to be around 7-9s  · No evidence of active bleeding  · Received 1 unit PRBC transfusion during this admission  · Stool occult blood was negative x1  · Hemoglobin overall stable    Results from last 7 days   Lab Units 12/17/22  0457 12/16/22  0544 12/15/22  0523 12/14/22  0457 12/13/22  0519 12/12/22  0442 12/11/22  0643   HEMOGLOBIN g/dL 7 7* 7 8* 7 8* 7 7* 8 3* 8 3* 8 3*   HEMATOCRIT % 26 5* 26 2* 25 7* 25 4* 27 1* 27 0* 26 5*   MCV fL  --  89 87 88 87 87 85         Moderate to severe aortic stenosis  Assessment & Plan  Severe AS on echo in 1/2022  · MARY showed mild to moderate concentric hypertrophy with EF of 55% with moderately thickened aortic valve with severe aortic valve stenosis    Gastro-esophageal reflux disease without esophagitis  Assessment & Plan  Continue Protonix    Obesity (BMI 30-39  9)  Assessment & Plan  Body mass index is 34 02 kg/m²  · Diet and lifestyle modification    CAD (coronary artery disease)  Assessment & Plan  Status post PCI with stents x 2   · Continue Imdur, statin  · Plavix was discontinued on prior admission      Stage 3a chronic kidney disease [facility])  Assessment & Plan  Lab Results   Component Value Date    EGFR 44 12/17/2022    EGFR 41 12/16/2022    EGFR 37 12/15/2022    CREATININE 1 44 (H) 12/17/2022    CREATININE 1 53 (H) 12/16/2022    CREATININE 1 69 (H) 12/15/2022     Baseline creatinine appears to be around 1 3-1 6s    · Creatinine remains at baseline  · Repeat lab work in AM       JOVI (obstructive sleep apnea)  Assessment & Plan  Continue CPAP at HS  Hyperlipidemia  Assessment & Plan  Continue statin    Chronic a-fib [facility])  Assessment & Plan  Status post pacemaker insertion  · Restarted Xarelto as no plans for biopsy      Benign prostatic hyperplasia  Assessment & Plan  Continue Proscar    Benign hypertension with chronic kidney disease, stage III (HCC)  Assessment & Plan  · Holding torsemide  · IV Bumex as noted above      VTE Pharmacologic Prophylaxis:   Xarelto    Patient Centered Rounds: I performed bedside rounds with nursing staff today  Discussions with Specialists or Other Care Team Provider: yes     Education and Discussions with Family / Patient: Updated  (daughter) at bedside  Time Spent for Care: 35 min  More than 50% of total time spent on counseling and coordination of care as described above  Current Length of Stay: 11 day(s)  Current Patient Status: Inpatient   Certification Statement: The patient will continue to require additional inpatient hospital stay due to Volume overload requiring IV Bumex  Discharge Plan: Anticipate discharge in 48-72 hrs to rehab facility  Code Status: Level 1 - Full Code    Subjective:     Patient states breathing feels somewhat better compared to yesterday    Objective:     Vitals:   Temp (24hrs), Av 6 °F (36 4 °C), Min:97 4 °F (36 3 °C), Max:97 8 °F (36 6 °C)    Temp:  [97 4 °F (36 3 °C)-97 8 °F (36 6 °C)] 97 8 °F (36 6 °C)  HR:  [73-75] 73  Resp:  [20-23] 22  BP: (131-146)/(62-78) 146/71  SpO2:  [90 %-100 %] 92 %  Body mass index is 34 02 kg/m²  Input and Output Summary (last 24 hours): Intake/Output Summary (Last 24 hours) at 2022 1028  Last data filed at 2022 0400  Gross per 24 hour   Intake --   Output 1250 ml   Net -1250 ml       Physical Exam:   Physical Exam  Vitals reviewed  Constitutional:       General: He is not in acute distress  Appearance: He is ill-appearing  HENT:      Head: Normocephalic and atraumatic     Eyes:      General: Right eye: No discharge  Left eye: No discharge  Extraocular Movements: Extraocular movements intact  Cardiovascular:      Rate and Rhythm: Normal rate and regular rhythm  Heart sounds: Murmur heard  Pulmonary:      Effort: Pulmonary effort is normal  No respiratory distress  Breath sounds: Rales present  No wheezing  Abdominal:      General: Bowel sounds are normal  There is no distension  Palpations: Abdomen is soft  Tenderness: There is no abdominal tenderness  Musculoskeletal:      Comments: Mild bilateral lower extremity edema   Neurological:      Mental Status: He is alert and oriented to person, place, and time  Additional Data:     Labs:  Results from last 7 days   Lab Units 12/17/22 0457 12/16/22  0544 12/13/22  0519 12/12/22  0442   WBC Thousand/uL  --  8 96   < > 12 77*   HEMOGLOBIN g/dL 7 7* 7 8*   < > 8 3*   HEMATOCRIT % 26 5* 26 2*   < > 27 0*   PLATELETS Thousands/uL  --  212   < > 227   BANDS PCT %  --   --   --  4   LYMPHO PCT %  --   --   --  7*   MONO PCT %  --   --   --  5   EOS PCT %  --   --   --  0    < > = values in this interval not displayed  Results from last 7 days   Lab Units 12/17/22 0457 12/16/22  0544 12/15/22  0523   SODIUM mmol/L 134*   < > 131*   POTASSIUM mmol/L 4 3   < > 4 1   CHLORIDE mmol/L 97   < > 95*   CO2 mmol/L 33*   < > 30   BUN mg/dL 41*   < > 48*   CREATININE mg/dL 1 44*   < > 1 69*   ANION GAP mmol/L 4   < > 6   CALCIUM mg/dL 8 4   < > 8 4   ALBUMIN g/dL  --   --  1 7*   TOTAL BILIRUBIN mg/dL  --   --  0 54   ALK PHOS U/L  --   --  118*   ALT U/L  --   --  9*   AST U/L  --   --  46*   GLUCOSE RANDOM mg/dL 152*   < > 112    < > = values in this interval not displayed           Results from last 7 days   Lab Units 12/15/22  1817   POC GLUCOSE mg/dl 205*               Lines/Drains:  Invasive Devices     Central Venous Catheter Line  Duration           CVC Central Lines 12/12/22 Single Right  4 days Central Line:  Goal for removal: N/A - Discharging with PICC for IV ABX/medications             Imaging: No pertinent imaging reviewed      Recent Cultures (last 7 days):         Last 24 Hours Medication List:   Current Facility-Administered Medications   Medication Dose Route Frequency Provider Last Rate   • acetaminophen  650 mg Oral Q6H PRN NAVA Coleman     • albuterol  2 5 mg Nebulization Q4H PRN Lynn Revjonesar, DO     • albuterol  2 5 mg Nebulization Q4H PRN Lynn Velezar, DO     • allopurinol  200 mg Oral Daily NAVA Coleman     • atorvastatin  40 mg Oral Daily With NAVA Callejas     • bumetanide  1 mg Intravenous BID Reymundo Banks MD     • cefazolin  2,000 mg Intravenous Q8H Julian Latif MD 2,000 mg (12/17/22 0543)   • docusate sodium  100 mg Oral BID Ron Ellis MD     • DULoxetine  60 mg Oral BID NAVA Coleman     • finasteride  5 mg Oral Daily NAVA Coleman     • fluticasone-vilanterol  1 puff Inhalation Daily NAVA Coleman     • gabapentin  100 mg Oral HS NAVA Coleman     • isosorbide mononitrate  30 mg Oral Daily NAVA Coleman     • levalbuterol  1 25 mg Nebulization TID Lynn Velezar, DO      And   • sodium chloride  3 mL Nebulization TID Lynn Velezar, DO     • lidocaine  1 patch Topical Daily Shelby Mueller PA-C     • methocarbamol  500 mg Oral Q6H PRN Lynn Revjonesar, DO     • methylPREDNISolone sodium succinate  40 mg Intravenous Q12H [address] Lynn Revjonesar, DO     • ondansetron  4 mg Intravenous Q6H PRN NAVA Coleman     • pantoprazole  40 mg Oral Early Morning NAVA Coleman     • polyethylene glycol  17 g Oral Daily PRN NAVA Coleman     • polyethylene glycol  17 g Oral Daily Ron Ellis MD     • rivaroxaban  15 mg Oral Daily With Breakfast Lynn Montano, DO     • saccharomyces boulardii  250 mg Oral BID Cuiyin Yurik, CRNP     • senna  1 tablet Oral HS Julian Latif MD     • umeclidinium  1 puff Inhalation Daily NAVA Coleman          Today, Patient Was Seen By: Luis Boggs DO    **Please Note: This note may have been constructed using a voice recognition system  **

## 2022-12-17 NOTE — ASSESSMENT & PLAN NOTE
Patient presented with somnolence for 24 hours and generalized weakness  Reported a little cough and runny nose, denies sore throat/SOB/fever/chills  · Patient hospitalized in Nov '22 for symptomatic anemia, hemoglobin 7 1 on admission with positive stool guaiac, received 1 unit of PRBC, underwent EGD and colonoscopy both showed no evidence of bleeding  Outpatient capsule endoscopy was recommended which is still pending   Patient remained on Xarelto  · WBC 15 38, no bands, patient afebrile initially  · Lactic acid normal   Procalcitonin elevated  · Chest x-ray showed no evidence of pneumonia  · UA no evidence of infection  COVID-19, flu RSV negative  · CT chest showed -  Numerous new pulmonary nodules, concerning for mets; Mediastinal and hilar lymphadenopathy, concerning for metastases; Small right pleural effusion with adjacent basal opacity, most likely represents atelectasis  Nodules could possibly be septic emboli  · Patient was given Zosyn in the ED, de-escalated to Rocephin  Currently on Ancef 2 g every 8 hours  Plan for 6 weeks course  · Blood cultures 2 out of 2 growing Methicillin sensitive Staphylococcus aureus  · ID input appreciated  · Echocardiogram showed EF of 60% with severe concentric hypertrophy, EF of 60% without any visible vegetations with severe attic stenosis  · Status post MARY which showed EF of 55% without any large vegetation but small vegetation could not be ruled out due to calcium and acoustic shadowing  · Unclear source-possible from skin translocation as patient noted to have multiple scabs  · Pacemaker removal was recommended by ID  per cardiology, pacemaker has been in place for 12 years and removal would be an extensive surgical procedure    Given multiple comorbidities cardiology recommended conservative approach and treatment with IV antibiotics  · Repeat blood cultures from 12/7/2022 negative

## 2022-12-17 NOTE — ASSESSMENT & PLAN NOTE
Status post PCI with stents x 2   · Continue Imdur, statin  · Plavix was discontinued on prior admission

## 2022-12-17 NOTE — ASSESSMENT & PLAN NOTE
On Trelegy, albuterol nebulizer p r n  At home  · Was initially on 2 L oxygen but currently remains on room air  · Substituted Trelegy with Debby Hence and Incruse  · Patient was noted to be wheezing on exam on 12/15    Currently on 40 mg of IV Solu-Medrol every 12 hours   · continue scheduled nebulizer treatments

## 2022-12-17 NOTE — ASSESSMENT & PLAN NOTE
Severe AS on echo in 1/2022  · MARY showed mild to moderate concentric hypertrophy with EF of 55% with moderately thickened aortic valve with severe aortic valve stenosis

## 2022-12-17 NOTE — ASSESSMENT & PLAN NOTE
Results from last 7 days   Lab Units 12/17/22  0457 12/16/22  0544 12/15/22  0523 12/14/22  0457 12/13/22  1559 12/13/22  0519 12/12/22  0442 12/11/22  0643   SODIUM mmol/L 134* 135 131* 129* 127* 128* 130* 134*     In the setting of volume overload  · Demadex was held in the setting of worsening creatinine but then restarted on 12/13  · Currently on IV Bumex twice daily as noted above  · Sodium level slowly has improved    Nephrology input appreciated  · Continue on 1200 mL fluid restriction

## 2022-12-18 LAB
ANION GAP SERPL CALCULATED.3IONS-SCNC: 6 MMOL/L (ref 4–13)
BUN SERPL-MCNC: 40 MG/DL (ref 5–25)
CALCIUM SERPL-MCNC: 8.6 MG/DL (ref 8.3–10.1)
CHLORIDE SERPL-SCNC: 98 MMOL/L (ref 96–108)
CO2 SERPL-SCNC: 32 MMOL/L (ref 21–32)
CREAT SERPL-MCNC: 1.4 MG/DL (ref 0.6–1.3)
ERYTHROCYTE [DISTWIDTH] IN BLOOD BY AUTOMATED COUNT: 20.3 % (ref 11.6–15.1)
GFR SERPL CREATININE-BSD FRML MDRD: 46 ML/MIN/1.73SQ M
GLUCOSE SERPL-MCNC: 167 MG/DL (ref 65–140)
HCT VFR BLD AUTO: 28.8 % (ref 36.5–49.3)
HGB BLD-MCNC: 8.3 G/DL (ref 12–17)
MCH RBC QN AUTO: 26.1 PG (ref 26.8–34.3)
MCHC RBC AUTO-ENTMCNC: 28.8 G/DL (ref 31.4–37.4)
MCV RBC AUTO: 91 FL (ref 82–98)
PLATELET # BLD AUTO: 245 THOUSANDS/UL (ref 149–390)
PMV BLD AUTO: 9.4 FL (ref 8.9–12.7)
POTASSIUM SERPL-SCNC: 4.3 MMOL/L (ref 3.5–5.3)
RBC # BLD AUTO: 3.18 MILLION/UL (ref 3.88–5.62)
SODIUM SERPL-SCNC: 136 MMOL/L (ref 135–147)
WBC # BLD AUTO: 10.35 THOUSAND/UL (ref 4.31–10.16)

## 2022-12-18 RX ORDER — BUMETANIDE 0.25 MG/ML
1 INJECTION, SOLUTION INTRAMUSCULAR; INTRAVENOUS 2 TIMES DAILY
Status: DISCONTINUED | OUTPATIENT
Start: 2022-12-18 | End: 2022-12-19

## 2022-12-18 RX ORDER — ACETYLCYSTEINE 200 MG/ML
3 SOLUTION ORAL; RESPIRATORY (INHALATION)
Status: DISCONTINUED | OUTPATIENT
Start: 2022-12-18 | End: 2022-12-20 | Stop reason: HOSPADM

## 2022-12-18 RX ORDER — ACETAMINOPHEN 325 MG/1
975 TABLET ORAL EVERY 8 HOURS SCHEDULED
Status: DISCONTINUED | OUTPATIENT
Start: 2022-12-18 | End: 2022-12-20 | Stop reason: HOSPADM

## 2022-12-18 RX ORDER — METHYLPREDNISOLONE SODIUM SUCCINATE 40 MG/ML
40 INJECTION, POWDER, LYOPHILIZED, FOR SOLUTION INTRAMUSCULAR; INTRAVENOUS DAILY
Status: DISCONTINUED | OUTPATIENT
Start: 2022-12-19 | End: 2022-12-20 | Stop reason: HOSPADM

## 2022-12-18 RX ADMIN — FLUTICASONE FUROATE AND VILANTEROL TRIFENATATE 1 PUFF: 200; 25 POWDER RESPIRATORY (INHALATION) at 08:46

## 2022-12-18 RX ADMIN — Medication 250 MG: at 17:30

## 2022-12-18 RX ADMIN — DOCUSATE SODIUM 100 MG: 100 CAPSULE, LIQUID FILLED ORAL at 08:44

## 2022-12-18 RX ADMIN — Medication 250 MG: at 08:44

## 2022-12-18 RX ADMIN — METHYLPREDNISOLONE SODIUM SUCCINATE 40 MG: 40 INJECTION, POWDER, FOR SOLUTION INTRAMUSCULAR; INTRAVENOUS at 08:44

## 2022-12-18 RX ADMIN — RIVAROXABAN 15 MG: 15 TABLET, FILM COATED ORAL at 08:44

## 2022-12-18 RX ADMIN — FINASTERIDE 5 MG: 5 TABLET, FILM COATED ORAL at 08:44

## 2022-12-18 RX ADMIN — METHOCARBAMOL 500 MG: 500 TABLET ORAL at 08:50

## 2022-12-18 RX ADMIN — SENNOSIDES 8.6 MG: 8.6 TABLET, FILM COATED ORAL at 21:14

## 2022-12-18 RX ADMIN — DULOXETINE HYDROCHLORIDE 60 MG: 60 CAPSULE, DELAYED RELEASE ORAL at 17:30

## 2022-12-18 RX ADMIN — LEVALBUTEROL HYDROCHLORIDE 1.25 MG: 1.25 SOLUTION, CONCENTRATE RESPIRATORY (INHALATION) at 07:38

## 2022-12-18 RX ADMIN — LIDOCAINE 5% 1 PATCH: 700 PATCH TOPICAL at 08:43

## 2022-12-18 RX ADMIN — UMECLIDINIUM 1 PUFF: 62.5 AEROSOL, POWDER ORAL at 08:46

## 2022-12-18 RX ADMIN — GABAPENTIN 100 MG: 100 CAPSULE ORAL at 21:14

## 2022-12-18 RX ADMIN — LEVALBUTEROL HYDROCHLORIDE 1.25 MG: 1.25 SOLUTION, CONCENTRATE RESPIRATORY (INHALATION) at 13:10

## 2022-12-18 RX ADMIN — ISODIUM CHLORIDE 3 ML: 0.03 SOLUTION RESPIRATORY (INHALATION) at 19:27

## 2022-12-18 RX ADMIN — ACETYLCYSTEINE 600 MG: 200 SOLUTION ORAL; RESPIRATORY (INHALATION) at 13:10

## 2022-12-18 RX ADMIN — ATORVASTATIN CALCIUM 40 MG: 40 TABLET, FILM COATED ORAL at 17:30

## 2022-12-18 RX ADMIN — CEFAZOLIN SODIUM 2000 MG: 2 SOLUTION INTRAVENOUS at 21:14

## 2022-12-18 RX ADMIN — CEFAZOLIN SODIUM 2000 MG: 2 SOLUTION INTRAVENOUS at 05:50

## 2022-12-18 RX ADMIN — ISOSORBIDE MONONITRATE 30 MG: 30 TABLET, EXTENDED RELEASE ORAL at 08:46

## 2022-12-18 RX ADMIN — ISODIUM CHLORIDE 3 ML: 0.03 SOLUTION RESPIRATORY (INHALATION) at 07:38

## 2022-12-18 RX ADMIN — CEFAZOLIN SODIUM 2000 MG: 2 SOLUTION INTRAVENOUS at 13:24

## 2022-12-18 RX ADMIN — ACETAMINOPHEN 975 MG: 325 TABLET, FILM COATED ORAL at 21:14

## 2022-12-18 RX ADMIN — PANTOPRAZOLE SODIUM 40 MG: 40 TABLET, DELAYED RELEASE ORAL at 05:50

## 2022-12-18 RX ADMIN — ISODIUM CHLORIDE 3 ML: 0.03 SOLUTION RESPIRATORY (INHALATION) at 13:10

## 2022-12-18 RX ADMIN — ALLOPURINOL 200 MG: 100 TABLET ORAL at 08:44

## 2022-12-18 RX ADMIN — DULOXETINE HYDROCHLORIDE 60 MG: 60 CAPSULE, DELAYED RELEASE ORAL at 08:44

## 2022-12-18 RX ADMIN — ACETYLCYSTEINE 600 MG: 200 SOLUTION ORAL; RESPIRATORY (INHALATION) at 19:26

## 2022-12-18 RX ADMIN — BUMETANIDE 1 MG: 0.25 INJECTION INTRAMUSCULAR; INTRAVENOUS at 17:30

## 2022-12-18 RX ADMIN — LEVALBUTEROL HYDROCHLORIDE 1.25 MG: 1.25 SOLUTION, CONCENTRATE RESPIRATORY (INHALATION) at 19:26

## 2022-12-18 RX ADMIN — DOCUSATE SODIUM 100 MG: 100 CAPSULE, LIQUID FILLED ORAL at 21:15

## 2022-12-18 RX ADMIN — BUMETANIDE 1 MG: 0.25 INJECTION INTRAMUSCULAR; INTRAVENOUS at 08:44

## 2022-12-18 NOTE — ASSESSMENT & PLAN NOTE
Baseline hemoglobin appears to be around 7-9s  · No evidence of active bleeding     · Status post 1 unit PRBC transfusion during this admission  · Stool occult blood was negative x1  · Hemoglobin overall stable    Results from last 7 days   Lab Units 12/18/22  0559 12/17/22  0457 12/16/22  0544 12/15/22  0523 12/14/22  0457 12/13/22  0519 12/12/22  0442   HEMOGLOBIN g/dL 8 3* 7 7* 7 8* 7 8* 7 7* 8 3* 8 3*   HEMATOCRIT % 28 8* 26 5* 26 2* 25 7* 25 4* 27 1* 27 0*   MCV fL 91  --  89 87 88 87 87

## 2022-12-18 NOTE — PROGRESS NOTES
[address] 45  Progress Note Pete Alcantar 1940, 80 y o  male MRN: [id number]  Unit/Bed#: 2 [place] [address] Encounter: [id number]  Primary Care Provider: Leighann Rizvi DO   Date and time admitted to hospital: 12/5/2022  6:39 PM    * MSSA bacteremia  Assessment & Plan  Patient presented with somnolence for 24 hours and generalized weakness  Reported a little cough and runny nose, denies sore throat/SOB/fever/chills  · Patient hospitalized in Nov '22 for symptomatic anemia, hemoglobin 7 1 on admission with positive stool guaiac, received 1 unit of PRBC, underwent EGD and colonoscopy both showed no evidence of bleeding  Outpatient capsule endoscopy was recommended which is still pending   Patient remained on Xarelto  · Initially - WBC 15 38, no bands, patient afebrile  · Lactic acid normal   Procalcitonin elevated  · Chest x-ray showed no evidence of pneumonia  · UA no evidence of infection  COVID-19, flu RSV negative  · CT chest showed -  Numerous new pulmonary nodules, concerning for mets; Mediastinal and hilar lymphadenopathy, concerning for mets; Small right pleural effusion with adjacent basal opacity, most likely atelectasis  Nodules could possibly be septic emboli  · Patient was given Zosyn in the ED, de-escalated to Rocephin  · Currently on Ancef 2 g every 8 hours for MSSA bacteremia  Plan for 6 weeks course  · ID input appreciated  · Echocardiogram showed EF of 60% with severe concentric hypertrophy, EF of 60% without any visible vegetations with severe attic stenosis  · Status post MARY which showed EF of 55% without any large vegetation but small vegetation could not be ruled out due to calcium and acoustic shadowing  · Unclear source-possible from skin translocation as patient noted to have multiple scabs  · Pacemaker removal was recommended by ID  per cardiology, pacemaker has been in place for 12 years and removal would be an extensive surgical procedure    Given multiple comorbidities cardiology recommended conservative approach and treatment with IV antibiotics  · Repeat blood cultures from 12/7/2022 negative    Acute encephalopathy  Assessment & Plan  Likely acute toxic metabolic encephalopathy due to underlying infection  Reported feeling sleepy and weak  · CT head - Hypodensity in the right parieto-occipital region which could be chronic infarct versus metastatic disease based on the CT of the chest  · Mental status has improved with treatment of sepsis  · ABG no hypercapnia  · Patient was initially worked up for possible stroke  · Could not get MRI of the brain due to incompatible pacemaker  · Was given full dose aspirin in the ED along with Lipitor 80 mg  · Hemoglobin A1c was 5 1  · Repeat CT head 12/12 showed persistent small patchy hypodensities concerning for vasogenic edema related to metastatic foci  · PT/OT evaluation and treatment    Chronic diastolic heart failure [facility])  Assessment & Plan  Wt Readings from Last 3 Encounters:   12/07/22 120 kg (265 lb)   11/22/22 121 kg (265 lb 10 5 oz)   10/20/22 120 kg (265 lb)     On Demadex 40 mg p o  Daily at home  · 2D echo in January 2022 showed normal EF, 65%, moderate concentric hypertrophy, dilated atriums, severe AS, mild to moderate AR, moderate MS  · Chest X-ray showed cardiomegaly with moderate congestive changes  · BNP was elevated at 14,000  · Demadex was initially held in the setting of sepsis and then later in the setting of worsening creatinine  Received dose of Lasix on 12/8 and 12/9 and 12/15  · Restarted on 20 mg of Demadex daily on 12/13    Currently on IV Bumex twice daily as patient appears volume overloaded with likely transition to [facility] tomorrow    Abnormal CAT scan  Assessment & Plan  CT chest - numerous new pulmonary nodules concerning for metastatic disease along with mediastinal and hilar lymphadenopathy and small right pleural effusion  · CT abdomen pelvis without contrast on 11/19/2022 was unremarkable  · CT head showed hypodensity in the right parieto-occipital region - 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  Repeat CT head on 12/12 showed persistent hypodensities concerning for metastatic foci  · Discussed with neurology who was concerned for metastatic lesion on CT brain  Oncology was consulted  · CT with IV contrast showed that 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  · Patient did have a colonoscopy during prior hospitalization which showed no masses  · No history of malignancy as per family  · Pulmonary recommended IR guided lung biopsy  Lung nodules could also be related to septic emboli  Per ID, will need repeat CT scan in 3 months  If the nodules are improving patient might not need further work-up but if the nodules are persistent might need biopsy  Family in agreement with this plan  · Patient to follow-up with oncology after discharge      Elevated troponin  Assessment & Plan  Mild troponin elevation most likely non MI troponin elevation secondary to sepsis  · EKG showed paced rhythm per prior provider  · Also complained of some chest pain on 12/8/22 and repeat troponin remains around 700  · Repeat EKG showed some inferolateral T wave inversion per prior provider  · Suspected secondary to symptomatic anemia  Symptoms improved after 1 unit of PRBC transfusion      Hyponatremia  Assessment & Plan  Results from last 7 days   Lab Units 12/18/22  0559 12/17/22  0457 12/16/22  0544 12/15/22  0523 12/14/22  0457 12/13/22  1559 12/13/22  0519 12/12/22  0442   SODIUM mmol/L 136 134* 135 131* 129* 127* 128* 130*     In the setting of volume overload  · Demadex was held in the setting of worsening creatinine but then restarted on 12/13  · Currently on IV Bumex twice daily as noted above  · Sodium level slowly continues to improve    Nephrology input appreciated  · Continue on 1200 mL fluid restriction    COPD (chronic obstructive pulmonary disease) ([facility] [address] )  Assessment & Plan  On Trelegy, albuterol nebulizer p r n  At home  · Was initially on 2 L oxygen but currently remains on room air  · Substituted Trelegy with Bunny Seed and Incruse  · Patient was noted to be wheezing on exam on 12/15  Decrease to 40 mg of IV Solu-Medrol daily from every 12 hours   · continue scheduled nebulizer treatments      Depression with anxiety  Assessment & Plan  Continue Cymbalta    Anemia  Assessment & Plan  Baseline hemoglobin appears to be around 7-9s  · No evidence of active bleeding  · Status post 1 unit PRBC transfusion during this admission  · Stool occult blood was negative x1  · Hemoglobin overall stable    Results from last 7 days   Lab Units 12/18/22  0559 12/17/22  0457 12/16/22  0544 12/15/22  0523 12/14/22  0457 12/13/22  0519 12/12/22  0442   HEMOGLOBIN g/dL 8 3* 7 7* 7 8* 7 8* 7 7* 8 3* 8 3*   HEMATOCRIT % 28 8* 26 5* 26 2* 25 7* 25 4* 27 1* 27 0*   MCV fL 91  --  89 87 88 87 87         Moderate to severe aortic stenosis  Assessment & Plan  Severe AS on echo in 1/2022  · MARY showed mild to moderate concentric hypertrophy with EF of 55% with moderately thickened aortic valve with severe aortic valve stenosis    Gastro-esophageal reflux disease without esophagitis  Assessment & Plan  Continue Protonix    Obesity (BMI 30-39  9)  Assessment & Plan  Body mass index is 34 02 kg/m²  · Diet and lifestyle modification    CAD (coronary artery disease)  Assessment & Plan  Status post PCI with stents x 2   · Continue Imdur, statin  · Plavix was discontinued on prior admission      Stage 3a chronic kidney disease [facility])  Assessment & Plan  Lab Results   Component Value Date    EGFR 46 12/18/2022    EGFR 44 12/17/2022    EGFR 41 12/16/2022    CREATININE 1 40 (H) 12/18/2022    CREATININE 1 44 (H) 12/17/2022    CREATININE 1 53 (H) 12/16/2022     Baseline creatinine appears to be around 1 3-1 6s    · Creatinine continues to remain at baseline  · Repeat lab work in AM       JOVI (obstructive sleep apnea)  Assessment & Plan  Continue CPAP at HS  Hyperlipidemia  Assessment & Plan  Continue Lipitor    Chronic a-fib [facility])  Assessment & Plan  Status post pacemaker insertion  · Xarelto was held for possible biopsy but since restarted      Benign prostatic hyperplasia  Assessment & Plan  Continue Proscar    Benign hypertension with chronic kidney disease, stage III (HCC)  Assessment & Plan  Holding torsemide  · IV Bumex as noted above        VTE Pharmacologic Prophylaxis:   Xarelto    Patient Centered Rounds: I performed bedside rounds with nursing staff today  Discussions with Specialists or Other Care Team Provider: yes    Education and Discussions with Family / Patient: Updated  (daughter) via phone  Time Spent for Care: 35 min  More than 50% of total time spent on counseling and coordination of care as described above  Current Length of Stay: 12 day(s)  Current Patient Status: Inpatient   Certification Statement: The patient will continue to require additional inpatient hospital stay due to Volume overload requiring IV Bumex  Discharge Plan: Anticipate discharge in 24-48 hrs to rehab facility  Code Status: Level 1 - Full Code    Subjective:     Patient states breathing is improving and wants to know when he is being discharged home    Objective:     Vitals:   Temp (24hrs), Av 7 °F (35 9 °C), Min:96 3 °F (35 7 °C), Max:96 9 °F (36 1 °C)    Temp:  [96 3 °F (35 7 °C)-96 9 °F (36 1 °C)] 96 3 °F (35 7 °C)  HR:  [70-77] 74  Resp:  [22-28] 22  BP: (145-171)/(67-77) 171/76  SpO2:  [94 %-100 %] 97 %  Body mass index is 34 02 kg/m²  Input and Output Summary (last 24 hours): Intake/Output Summary (Last 24 hours) at 2022 1739  Last data filed at 2022 1324  Gross per 24 hour   Intake 180 ml   Output 1325 ml   Net -1145 ml       Physical Exam:   Physical Exam  Vitals reviewed  Constitutional:       General: He is not in acute distress  Appearance: He is ill-appearing (Chronically)  HENT:      Head: Normocephalic and atraumatic  Eyes:      General:         Right eye: No discharge  Left eye: No discharge  Cardiovascular:      Rate and Rhythm: Normal rate and regular rhythm  Pulmonary:      Effort: Pulmonary effort is normal  No respiratory distress  Breath sounds: Rales (intermittent) present  No wheezing  Abdominal:      General: Bowel sounds are normal  There is no distension  Palpations: Abdomen is soft  Tenderness: There is no abdominal tenderness  Musculoskeletal:      Comments: Improving bilateral lower extremity edema   Neurological:      Mental Status: He is alert  Additional Data:     Labs:  Results from last 7 days   Lab Units 12/18/22  0559 12/13/22  0519 12/12/22  0442   WBC Thousand/uL 10 35*   < > 12 77*   HEMOGLOBIN g/dL 8 3*   < > 8 3*   HEMATOCRIT % 28 8*   < > 27 0*   PLATELETS Thousands/uL 245   < > 227   BANDS PCT %  --   --  4   LYMPHO PCT %  --   --  7*   MONO PCT %  --   --  5   EOS PCT %  --   --  0    < > = values in this interval not displayed  Results from last 7 days   Lab Units 12/18/22  0559 12/16/22  0544 12/15/22  0523   SODIUM mmol/L 136   < > 131*   POTASSIUM mmol/L 4 3   < > 4 1   CHLORIDE mmol/L 98   < > 95*   CO2 mmol/L 32   < > 30   BUN mg/dL 40*   < > 48*   CREATININE mg/dL 1 40*   < > 1 69*   ANION GAP mmol/L 6   < > 6   CALCIUM mg/dL 8 6   < > 8 4   ALBUMIN g/dL  --   --  1 7*   TOTAL BILIRUBIN mg/dL  --   --  0 54   ALK PHOS U/L  --   --  118*   ALT U/L  --   --  9*   AST U/L  --   --  46*   GLUCOSE RANDOM mg/dL 167*   < > 112    < > = values in this interval not displayed           Results from last 7 days   Lab Units 12/15/22  1817   POC GLUCOSE mg/dl 205*               Lines/Drains:  Invasive Devices     Central Venous Catheter Line  Duration           CVC Central Lines 12/12/22 Single Right  6 days                Central Line:  Goal for removal: N/A - Discharging with PICC for IV ABX/medications Imaging: Reviewed radiology reports from this admission including: chest xray    Recent Cultures (last 7 days):         Last 24 Hours Medication List:   Current Facility-Administered Medications   Medication Dose Route Frequency Provider Last Rate   • acetaminophen  975 mg Oral Q8H [facility] Lynn Revankar, DO     • acetylcysteine  3 mL Nebulization TID Lynn Revankar, DO     • albuterol  2 5 mg Nebulization Q4H PRN Lynn Revankar, DO     • albuterol  2 5 mg Nebulization Q4H PRN Lynn Revankar, DO     • allopurinol  200 mg Oral Daily NAVA Coleman     • atorvastatin  40 mg Oral Daily With NAVA French     • bumetanide  1 mg Intravenous BID Valorie Siddiqi MD     • cefazolin  2,000 mg Intravenous Q8H Ron Ellis MD 2,000 mg (12/18/22 1324)   • docusate sodium  100 mg Oral BID Ron Ellis MD     • DULoxetine  60 mg Oral BID NAVA Coleman     • finasteride  5 mg Oral Daily NAVA Coleman     • fluticasone-vilanterol  1 puff Inhalation Daily NAVA Coleman     • gabapentin  100 mg Oral HS NAVA Coleman     • isosorbide mononitrate  30 mg Oral Daily NAVA Coleman     • levalbuterol  1 25 mg Nebulization TID Lynn Revjonesar, DO      And   • sodium chloride  3 mL Nebulization TID Lynn Revankar, DO     • lidocaine  1 patch Topical Daily Shelby Mueller PA-C     • methocarbamol  500 mg Oral Q6H PRN Lynn Revankar, DO     • [START ON 12/19/2022] methylPREDNISolone sodium succinate  40 mg Intravenous Daily Lynn Revankar, DO     • ondansetron  4 mg Intravenous Q6H PRN NAVA Coleman     • pantoprazole  40 mg Oral Early Morning NAVA Coleman     • polyethylene glycol  17 g Oral Daily PRN NAVA Coleman     • polyethylene glycol  17 g Oral Daily Ron Ellis MD     • rivaroxaban  15 mg Oral Daily With Breakfast Lynn Revankar, DO     • saccharomyces boulardii  250 mg Oral BID NAVA Coleman     • senna  1 tablet Oral HS Ron MD Rhonda     • umeclidinium  1 puff Inhalation Daily NAVA Coleman          Today, Patient Was Seen By: Katie Stanley DO    **Please Note: This note may have been constructed using a voice recognition system  **

## 2022-12-18 NOTE — ASSESSMENT & PLAN NOTE
Mild troponin elevation most likely non MI troponin elevation secondary to sepsis  · EKG showed paced rhythm per prior provider  · Also complained of some chest pain on 12/8/22 and repeat troponin remains around 700  · Repeat EKG showed some inferolateral T wave inversion per prior provider  · Suspected secondary to symptomatic anemia    Symptoms improved after 1 unit of PRBC transfusion

## 2022-12-18 NOTE — OCCUPATIONAL THERAPY NOTE
Occupational Therapy Treatment Session        12/18/22 1050   OT Last Visit   OT Visit Date 12/18/22   Note Type   Note Type Treatment   Pain Assessment   Pain Assessment Tool 0-10   Pain Score No Pain   Carlos-Baker FACES Pain Rating 0   Restrictions/Precautions   Weight Bearing Precautions Per Order No   Other Precautions Bed Alarm; Chair Alarm;Telemetry;O2;Fall Risk   ADL   Where Assessed Chair   Eating Assistance 7  Independent   Grooming Assistance 5  Supervision/Setup   Grooming Deficit Wash/dry hands   LB Dressing Assistance 1  Total Assistance   LB Dressing Deficit Don/doff L sock; Don/doff R sock   LB Dressing Comments pt with decreased endurance/ SOB/fatigue and increased body habitus that limit his tolerance for functional forward reach to be able to perform LB dressing   Toileting Assistance  1  Total Assistance   Toileting Deficit Setup;Use of bedpan/urinal setup   Toileting Comments pt with decreased endurance/ SOB/fatigue and increased body habitus that limit his tolerance for functional forward reach to be able to hold urinal   Functional Standing Tolerance   Time ~30 seconds   Activity to transfer back to bed, stood statically to get ready to transfer back to bed, but tolerance is poor  pt unable to stand longer and began to sit   Bed Mobility   Sit to Supine 3  Moderate assistance   Additional items Assist x 2; Increased time required;Verbal cues;LE management   Transfers   Sit to Stand 3  Moderate assistance   Additional items Assist x 2; Increased time required;Verbal cues   Stand to Sit 3  Moderate assistance   Additional items Assist x 2; Increased time required;Verbal cues   Stand pivot 3  Moderate assistance   Additional items Assist x 2; Increased time required;Verbal cues   Functional Mobility   Functional Mobility 4  Minimal assistance   Additional Comments A x2 for steadying/balance and safety   pt took a few steps from chair to bed and presented with posterior lean and with 2 losses of balance requirng increased assistance and verbal coaching to correct  Therapeutic Exercise - ROM   UE-ROM Yes   ROM- Right Upper Extremities   R Shoulder AROM; Flexion;ABduction; Extension;Horizontal ABduction  (adduction, horizontal adduction)   R Elbow AROM;Elbow flexion;Elbow extension   R Wrist AROM; Wrist flexion;Wrist extension   R Hand AROM; Thumb; Index finger; Long finger;Ring finger;Little finger   R Position Seated   R Weight/Reps/Sets 1 set x 2 reps   RUE ROM Comment significant rest breaks throughout due to fatigue   ROM - Left Upper Extremities    L Shoulder AROM; Flexion;ABduction;Horizontal ABduction; Extension  (adduction, horizontal adduction)   L Elbow AROM;Elbow flexion;Elbow extension   L Wrist AROM; Wrist flexion;Wrist extension   L Hand AROM; Thumb; Index finger; Long finger;Ring finger;Little finger   L Position Seated   L Weight/Reps/Sets 1 set x 2 reps   LUE ROM Comment significant rest breaks throughout due to fatigue   Cognition   Overall Cognitive Status [facility]   Arousal/Participation Alert   Attention Attends with cues to redirect   Comments pt became irritated at the end of session as he was requesting CPAP be applied  CCT present and educated that RT stated he cannot have it during the day only at night time  pt became annoyed with this and required continued education  pt agreeable to O2 via NC despite being educated that his O2 saturation was at 95% on RA  Pt again irritated when he felt he CCT was taking too long to don the O2 via NC  pt is slow to process and soft spoken   Activity Tolerance   Activity Tolerance Patient limited by fatigue   Medical Staff Made Aware RN   Assessment   Assessment Patient participated in OT treatment session this AM focusing on ADLs, functional transfers, functional mobility, and UE strengthening  Patient received out of bed in the chair in NAD with + chair alarm   Patient agreeable and tolerated session fairly, limited by fatigue, SOB and required significant rest breaks throughout  The patient's raw score on the AM-PAC Daily Activity inpatient short form is 11, standardized score is 29 04, less than 39 4  Patients at this level are likely to benefit from DC to post-acute rehabilitation services  Please refer to the recommendation of the Occupational Therapist for safe DC planning  Plan   Treatment Interventions ADL retraining;Functional transfer training;UE strengthening/ROM; Endurance training;Patient/family training;Equipment evaluation/education; Compensatory technique education;Continued evaluation; Energy conservation; Activityengagement   Goal Expiration Date 12/21/22   OT Treatment Day 1   OT Frequency 3-5x/wk   Recommendation   OT Discharge Recommendation Post acute rehabilitation services   [facility] Daily Activity Inpatient   Lower Body Dressing 1   Bathing 1   Toileting 1   Upper Body Dressing 2   Grooming 3   Eating 3   Daily Activity Raw Score 11   Daily Activity Standardized Score (Calc for Raw Score >=11) 29 04   [facility] Applied Cognition Inpatient   Following a Speech/Presentation 3   Understanding Ordinary Conversation 4   Taking Medications 3   Remembering Where Things Are Placed or Put Away 4   Remembering List of 4-5 Errands 3   Taking Care of Complicated Tasks 3   Applied Cognition Raw Score 20   Applied Cognition Standardized Score 41 76   Barthel Index   Feeding 10   Bathing 0   Grooming Score 0   Dressing Score 0   Bladder Score 10   Bowels Score 5   Toilet Use Score 0   Transfers (Bed/Chair) Score 5   Mobility (Level Surface) Score 0   Stairs Score 5   Barthel Index Score 35   End of Consult   Education Provided Yes   Patient Position at End of Consult Supine; All needs within reach;Bed/Chair alarm activated   Nurse Communication Nurse aware of consult   End of Consult Comments pt returned to bed per pt request         Erick Rivas [address] 87, OTR/L [id number]

## 2022-12-18 NOTE — ASSESSMENT & PLAN NOTE
Likely acute toxic metabolic encephalopathy due to underlying infection  Reported feeling sleepy and weak  · CT head - Hypodensity in the right parieto-occipital region which could be chronic infarct versus metastatic disease based on the CT of the chest  · Mental status has improved with treatment of sepsis  · ABG no hypercapnia  · Patient was initially worked up for possible stroke  · Could not get MRI of the brain due to incompatible pacemaker  · Was given full dose aspirin in the ED along with Lipitor 80 mg  · Hemoglobin A1c was 5 1  · Repeat CT head 12/12 showed persistent small patchy hypodensities concerning for vasogenic edema related to metastatic foci    · PT/OT evaluation and treatment

## 2022-12-18 NOTE — PROGRESS NOTES
NEPHROLOGY PROGRESS NOTE   Carlos Manuel Odell 80 y o  male MRN: [id number]  Unit/Bed#: 2 [place] [address] Encounter: [id number]  Reason for Consult: Hyponatremia    ASSESSMENT AND PLAN:  80 y  o  man with PMH of COPD, CAD status post PCI, CHF, BPH, GERD, arctic stenosis, A  fib, p/w lethargy and weakness  Nephrology is consulted for management of hyponatremia      PLAN     #Hyposomolar Hyponatremia (resolved)  • Sodium on admission: 133 mEq/L   • Serum osm: 285   • Urine osm: 326 (elevated) reflects presence of ADH  • Urine Sodium: 7  • Current sodium: Stable at 137 mEq/L  • Etiology: Multifactorial likely secondary to SIADH in the settings of pulmonary nodules, possible malignancy, Cymbalta and component of fluid overload   • Rate of correction: Appropriate correction  • Plan:  • Restriction 1 2 L   • BMP Q 24      #Volume status/blood pressure:  • Volume:  Fluid overload continue to improve  • Blood pressure: Normotensive /62mmHg, goal<140/90  • Recommend:  • Recommend continue Bumex 1 mg twice a day IV for today and transition to torsemide home dose 40 mg-20 tomorrow        #CKD G3b  • Baseline creatinine: 1 5 to 1 8 mg/dL  • Current creatinine:   At baseline  • Etiology: Secondary to nephrosclerosis in the settings of vascular disease  • UA: No hematuria, no leukocyturia  • Plan:  • No indication of urgent dialysis at this time  • Maintain mean arterial pressure above 65 mmHg to avoid hypotension/hypoperfusion  • Avoid nephrotoxic agents such as NSAIDs and contrast if at all possible    • Avoid opioids   • Adjust medications to GFR  • Renal diet   • Patient should continue follow-up on discharge with nephrology within 3 weeks of discharge     #Acid base disorder  • serum HLM2 [id number]   • Metabolic alkalosis likely secondary to muscular contraction in the settings of diuretics     #CKD-MBD/hypercalcemia  • Calcium 8 6  mg/dL  • Possible secondary to malignancy     #Secondary Hyperparathyroidism   • iPTH  77 4     #Anemia:  • Current hemoglobin: 8 3 mg/dL  • Treatment:  • Transfuse for hemoglobin less than 7 0 per primary service       #AMS  • CT head with chronic findings  • Neurology following     #Pulmonary nodules  • malignancy versus infection      #MSSA bacteremia  • On cefazolin  • Follow-up by ID     Nephrology will sign off at this time  Thank you for involving us in this case  Please call us if any question  SUBJECTIVE:  Patient seen and examined at bedside  Patient reports improvement of shortness of breath, good urinary output 1 7 L in the last 24 hours        OBJECTIVE:  Current Weight: Weight - Scale: 120 kg (265 lb)  Vitals:    12/18/22 0738   BP:    Pulse:    Resp:    Temp:    SpO2: 99%       Intake/Output Summary (Last 24 hours) at 12/18/2022 0825  Last data filed at 12/18/2022 0401  Gross per 24 hour   Intake 300 ml   Output 1725 ml   Net -1425 ml     Wt Readings from Last 3 Encounters:   12/07/22 120 kg (265 lb)   11/22/22 121 kg (265 lb 10 5 oz)   10/20/22 120 kg (265 lb)     Temp Readings from Last 3 Encounters:   12/18/22 (!) 96 3 °F (35 7 °C)   11/22/22 97 6 °F (36 4 °C)   10/05/22 (!) 97 4 °F (36 3 °C)     BP Readings from Last 3 Encounters:   12/18/22 162/77   11/22/22 119/64   10/20/22 130/70     Pulse Readings from Last 3 Encounters:   12/18/22 70   11/22/22 70   10/20/22 86      General:  no acute distress at this time  Skin:  No acute rash  Eyes:  No scleral icterus and noninjected  ENT:  mucous membranes moist  Neck:  no carotid bruits  Chest: Fine bilateral crackles, good respiratory effort, no use of accessory respiratory muscles  CVS:  Regular rate and rhythm without rub   Abdomen:  soft and nontender   Extremities: Trace lower extremity edema  Neuro:  No gross focality  Psych:  Alert , cooperative     Medications:    Current Facility-Administered Medications:   •  acetaminophen (TYLENOL) tablet 650 mg, 650 mg, Oral, Q6H PRN, NAVA Coleman, 650 mg at 12/08/22 1531  • albuterol inhalation solution 2 5 mg, 2 5 mg, Nebulization, Q4H PRN, Lynn Revankar, DO, 2 5 mg at 12/15/22 1845  •  albuterol inhalation solution 2 5 mg, 2 5 mg, Nebulization, Q4H PRN, Lynn Revankar, DO  •  allopurinol (ZYLOPRIM) tablet 200 mg, 200 mg, Oral, Daily, Kel Arce, CRNP, 200 mg at 12/17/22 0802  •  atorvastatin (LIPITOR) tablet 40 mg, 40 mg, Oral, Daily With Dinner, Cuibarbara Arce, CRNP, 40 mg at 12/17/22 1645  •  bumetanide (BUMEX) injection 1 mg, 1 mg, Intravenous, BID, Cade Howell MD  •  ceFAZolin (ANCEF) IVPB (premix in dextrose) 2,000 mg 50 mL, 2,000 mg, Intravenous, Q8H, Vane Dalal MD, Stopped at 12/18/22 0630  •  docusate sodium (COLACE) capsule 100 mg, 100 mg, Oral, BID, Ron Ellis MD, 100 mg at 12/17/22 2304  •  DULoxetine (CYMBALTA) delayed release capsule 60 mg, 60 mg, Oral, BID, NAVA Coleman, 60 mg at 12/17/22 1740  •  finasteride (PROSCAR) tablet 5 mg, 5 mg, Oral, Daily, Cucristal Arce, CRNP, 5 mg at 12/17/22 0802  •  fluticasone-vilanterol 200-25 mcg/actuation 1 puff, 1 puff, Inhalation, Daily, NAVA Coleman, 1 puff at 12/17/22 0803  •  gabapentin (NEURONTIN) capsule 100 mg, 100 mg, Oral, HS, Cuibarbara Hanksrirajendra, CRNP, 100 mg at 12/17/22 2304  •  isosorbide mononitrate (IMDUR) 24 hr tablet 30 mg, 30 mg, Oral, Daily, Kel Hanksrirjaendra, CRNP, 30 mg at 12/17/22 0802  •  levalbuterol (XOPENEX) inhalation solution 1 25 mg, 1 25 mg, Nebulization, TID, 1 25 mg at 12/18/22 0738 **AND** sodium chloride 0 9 % inhalation solution 3 mL, 3 mL, Nebulization, TID, Lynn Velezar, DO, 3 mL at 12/18/22 0738  •  lidocaine (LIDODERM) 5 % patch 1 patch, 1 patch, Topical, Daily, Shelby Mueller PA-C, 1 patch at 12/17/22 0803  •  methocarbamol (ROBAXIN) tablet 500 mg, 500 mg, Oral, Q6H PRN, Lynn Velezar, DO, 500 mg at 12/17/22 0813  •  methylPREDNISolone sodium succinate (Solu-MEDROL) injection 40 mg, 40 mg, Intravenous, Q12H [address], Lynn Revankar, DO, 40 mg at 12/17/22 2304  • ondansetron (ZOFRAN) injection 4 mg, 4 mg, Intravenous, Q6H PRN, NAVA Coleman, 4 mg at 12/13/22 1150  •  pantoprazole (PROTONIX) EC tablet 40 mg, 40 mg, Oral, Early Morning, NAVA Coleman, 40 mg at 12/18/22 0550  •  polyethylene glycol (MIRALAX) packet 17 g, 17 g, Oral, Daily PRN, NAVA Coleman  •  polyethylene glycol (MIRALAX) packet 17 g, 17 g, Oral, Daily, Ron Ellis MD, 17 g at 12/17/22 0802  •  rivaroxaban (XARELTO) tablet 15 mg, 15 mg, Oral, Daily With Breakfast, Lynn Montano DO, 15 mg at 12/17/22 0802  •  saccharomyces boulardii (FLORASTOR) capsule 250 mg, 250 mg, Oral, BID, NAVA Coleman, 250 mg at 12/17/22 1740  •  senna (SENOKOT) tablet 8 6 mg, 1 tablet, Oral, HS, Ron Ellis MD, 8 6 mg at 12/17/22 2304  •  umeclidinium 62 5 mcg/actuation inhaler AEPB 1 puff, 1 puff, Inhalation, Daily, NAVA Coleman, 1 puff at 12/17/22 0804    Laboratory Results:  Results from last 7 days   Lab Units 12/18/22  0559 12/17/22  0457 12/16/22  0544 12/15/22  0523 12/14/22  0457 12/13/22  1559 12/13/22  0519 12/12/22  0442   WBC Thousand/uL 10 35*  --  8 96 7 55 8 98  --  11 59* 12 77*   HEMOGLOBIN g/dL 8 3* 7 7* 7 8* 7 8* 7 7*  --  8 3* 8 3*   HEMATOCRIT % 28 8* 26 5* 26 2* 25 7* 25 4*  --  27 1* 27 0*   PLATELETS Thousands/uL 245  --  212 210 232  --  249 227   SODIUM mmol/L 136 134* 135 131* 129* 127* 128* 130*   POTASSIUM mmol/L 4 3 4 3 4 4 4 1 4 2 4 3 4 4 4 3   CHLORIDE mmol/L 98 97 97 95* 91* 92* 92* 93*   CO2 mmol/L 32 33* 30 30 28 29 26 27   BUN mg/dL 40* 41* 42* 48* 50* 54* 48* 43*   CREATININE mg/dL 1 40* 1 44* 1 53* 1 69* 1 88* 1 97* 1 86* 1 81*   CALCIUM mg/dL 8 6 8 4 8 5 8 4 8 6 8 7 8 8 8 8   MAGNESIUM mg/dL  --   --  2 2 2 2  --   --   --   --        XR chest portable   Final Result by Efrem Young MD (12/15 1946)      Interstitial pulmonary edema with bilateral pleural effusions and bibasilar atelectasis  Cardiomegaly                    Workstation performed: [id number]         CT head w wo contrast   Final Result by Salo Wheeler MD (12/15 2228)      Subcortical hypodensities reported on recent CT examination do not demonstrate enhancement to suggest abscess  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                     Workstation performed: [id number]         IR tunneled central line placement   Final Result by Wyatt Mendez MD (12/13 4147)      Insertion of right-sided dual-lumen tunneled small bore central venous catheter, with tip in the expected location of the cavoatrial junction  Plan:       The catheter may be used immediately  Workstation performed: [id number]         CT spine cervical wo contrast   Final Result by Damian Richey MD (12/12 1600)   1  The cervical vertebral body heights are maintained demonstrating no acute fracture or subluxation  2  Multilevel cervical degenerative disc disease similar to 11/13/2018  3  New focal nodular lesion within the anterior right lung apex  Please refer to the CT chest examination of 12/6/2022 for further details  Small bilateral pleural effusions, new on the left  Workstation performed: [id number]         CT head wo contrast   Final Result by Damian Richey MD (12/12 1668)      1  Persistent small patchy subcortical white matter hypodensities involving the high right frontoparietal, right lateral frontal and right posterior parietal regions concerning for vasogenic edema related to metastatic foci  MRI of the brain with    contrast is recommended for further evaluation  2   Mild chronic microtraumatic ischemic changes  The study was marked in EPIC for significant notification                 Workstation performed: [id number]         XR abdomen obstruction series   Final Result by Milind Jones MD (12/12 3125)      No evidence of small bowel obstruction or free air   Moderate amount of colonic stool         Workstation performed: [id number]         CT head without contrast   Final Result by Janine Chung MD (12/06 6987)   Addendum (preliminary) 1 of 1 by Janine Chung MD (12/06 0409)   ADDENDUM:      In light of the concurrent chest CT findings, the right parieto-occipital    abnormality may also represent a metastasis      Final      Hypodensity in the right parieto-occipital region most likely represents a chronic infarct, however no prior study is available to confirm this      The study was marked in [facility] for immediate notification  Workstation performed: [id number]         CT chest without contrast   Final Result by Janine Chung MD (12/06 5254)      1  Numerous new pulmonary nodules, concerning for metastatic disease   2  Mediastinal and hilar lymphadenopathy, also concerning for metastases   3  Small right pleural effusion               Workstation performed: [id number]         XR chest 1 view portable   Final Result by Prateek Hua MD (12/06 4305)      Prominent pulmonary vasculature and interstitial markings suggesting moderate congestive changes, stable  Workstation performed: [id number]             Portions of the record may have been created with voice recognition software  Occasional wrong word or "sound a like" substitutions may have occurred due to the inherent limitations of voice recognition software  Read the chart carefully and recognize, using context, where substitutions have occurred

## 2022-12-18 NOTE — ASSESSMENT & PLAN NOTE
Results from last 7 days   Lab Units 12/18/22  0559 12/17/22  0457 12/16/22  0544 12/15/22  0523 12/14/22  0457 12/13/22  1559 12/13/22  0519 12/12/22  0442   SODIUM mmol/L 136 134* 135 131* 129* 127* 128* 130*     In the setting of volume overload  · Demadex was held in the setting of worsening creatinine but then restarted on 12/13  · Currently on IV Bumex twice daily as noted above  · Sodium level slowly continues to improve    Nephrology input appreciated  · Continue on 1200 mL fluid restriction

## 2022-12-18 NOTE — ASSESSMENT & PLAN NOTE
CT chest - numerous new pulmonary nodules concerning for metastatic disease along with mediastinal and hilar lymphadenopathy and small right pleural effusion  · CT abdomen pelvis without contrast on 11/19/2022 was unremarkable  · CT head showed hypodensity in the right parieto-occipital region - 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  Repeat CT head on 12/12 showed persistent hypodensities concerning for metastatic foci  · Discussed with neurology who was concerned for metastatic lesion on CT brain  Oncology was consulted  · CT with IV contrast showed that 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  · Patient did have a colonoscopy during prior hospitalization which showed no masses  · No history of malignancy as per family  · Pulmonary recommended IR guided lung biopsy  Lung nodules could also be related to septic emboli  Per ID, will need repeat CT scan in 3 months  If the nodules are improving patient might not need further work-up but if the nodules are persistent might need biopsy    Family in agreement with this plan  · Patient to follow-up with oncology after discharge

## 2022-12-18 NOTE — PLAN OF CARE
Problem: Potential for Falls  Goal: Patient will remain free of falls  Description: INTERVENTIONS:  - Educate patient/family on patient safety including physical limitations  - Instruct patient to call for assistance with activity   - Consult OT/PT to assist with strengthening/mobility   - Keep Call bell within reach  - Keep bed low and locked with side rails adjusted as appropriate  - Keep care items and personal belongings within reach  - Initiate and maintain comfort rounds  - Make Fall Risk Sign visible to staff  - Offer Toileting every 2 Hours, in advance of need  - Initiate/Maintain alarm  - Obtain necessary fall risk management equipment  - Apply yellow socks and bracelet for high fall risk patients  - Consider moving patient to room near nurses station  Outcome: Progressing     Problem: RESPIRATORY - ADULT  Goal: Achieves optimal ventilation and oxygenation  Description: INTERVENTIONS:  - Assess for changes in respiratory status  - Assess for changes in mentation and behavior  - Position to facilitate oxygenation and minimize respiratory effort  - Oxygen administered by appropriate delivery if ordered  - Initiate smoking cessation education as indicated  - Encourage broncho-pulmonary hygiene including cough, deep breathe, Incentive Spirometry  - Assess the need for suctioning and aspirate as needed  - Assess and instruct to report SOB or any respiratory difficulty  - Respiratory Therapy support as indicated  Outcome: Progressing     Problem: MOBILITY - ADULT  Goal: Maintain or return to baseline ADL function  Description: INTERVENTIONS:  -  Assess patient's ability to carry out ADLs; assess patient's baseline for ADL function and identify physical deficits which impact ability to perform ADLs (bathing, care of mouth/teeth, toileting, grooming, dressing, etc )  - Assess/evaluate cause of self-care deficits   - Assess range of motion  - Assess patient's mobility; develop plan if impaired  - Assess patient's need for assistive devices and provide as appropriate  - Encourage maximum independence but intervene and supervise when necessary  - Involve family in performance of ADLs  - Assess for home care needs following discharge   - Consider OT consult to assist with ADL evaluation and planning for discharge  - Provide patient education as appropriate  Outcome: Progressing  Goal: Maintains/Returns to pre admission functional level  Description: INTERVENTIONS:  - Perform BMAT or MOVE assessment daily    - Set and communicate daily mobility goal to care team and patient/family/caregiver  - Collaborate with rehabilitation services on mobility goals if consulted  - Perform Range of Motion 3 times a day  - Reposition patient every 2 hours  - Dangle patient 3 times a day  - Stand patient 3 times a day  - Ambulate patient 3 times a day  - Out of bed to chair 3 times a day   - Out of bed for meals 3 times a day  - Out of bed for toileting  - Record patient progress and toleration of activity level   Outcome: Progressing     Problem: Prexisting or High Potential for Compromised Skin Integrity  Goal: Skin integrity is maintained or improved  Description: INTERVENTIONS:  - Identify patients at risk for skin breakdown  - Assess and monitor skin integrity  - Assess and monitor nutrition and hydration status  - Monitor labs   - Assess for incontinence   - Turn and reposition patient  - Assist with mobility/ambulation  - Relieve pressure over bony prominences  - Avoid friction and shearing  - Provide appropriate hygiene as needed including keeping skin clean and dry  - Evaluate need for skin moisturizer/barrier cream  - Collaborate with interdisciplinary team   - Patient/family teaching  - Consider wound care consult   Outcome: Progressing     Problem:  Activity Intolerance/Impaired Mobility  Goal: Mobility/activity is maintained at optimum level for patient  Description: Interventions:  - Assess and monitor patient  barriers to mobility and need for assistive/adaptive devices  - Assess patient's emotional response to limitations  - Collaborate with interdisciplinary team and initiate plans and interventions as ordered  - Encourage independent activity per ability   - Maintain proper body alignment  - Perform active/passive rom as tolerated/ordered  - Plan activities to conserve energy   - Turn patient as appropriate  Outcome: Progressing     Problem: Potential for Aspiration  Goal: Non-ventilated patient's risk of aspiration is minimized  Description: Assess and monitor vital signs, respiratory status, and labs (WBC)  Monitor for signs of aspiration (tachypnea, cough, rales, wheezing, cyanosis, fever)  - Assess and monitor patient's ability to swallow  - Place patient up in chair to eat if possible  - HOB up at 90 degrees to eat if unable to get patient up into chair   - Supervise patient during oral intake  - Instruct patient/ family to take small bites  - Instruct patient/ family to take small single sips when taking liquids  - Follow patient-specific strategies generated by speech pathologist   Outcome: Progressing     Problem: Nutrition/Hydration-ADULT  Goal: Nutrient/Hydration intake appropriate for improving, restoring or maintaining nutritional needs  Description: Monitor and assess patient's nutrition/hydration status for malnutrition  Collaborate with interdisciplinary team and initiate plan and interventions as ordered  Monitor patient's weight and dietary intake as ordered or per policy  Utilize nutrition screening tool and intervene as necessary  Determine patient's food preferences and provide high-protein, high-caloric foods as appropriate       INTERVENTIONS:  - Monitor oral intake, urinary output, labs, and treatment plans  - Assess nutrition and hydration status and recommend course of action  - Evaluate amount of meals eaten  - Assist patient with eating if necessary   - Allow adequate time for meals  - Recommend/ encourage appropriate diets, oral nutritional supplements, and vitamin/mineral supplements  - Order, calculate, and assess calorie counts as needed  - Recommend, monitor, and adjust tube feedings and TPN/PPN based on assessed needs  - Assess need for intravenous fluids  - Provide specific nutrition/hydration education as appropriate  - Include patient/family/caregiver in decisions related to nutrition  Outcome: Progressing

## 2022-12-18 NOTE — ASSESSMENT & PLAN NOTE
On Trelegy, albuterol nebulizer p r n  At home  · Was initially on 2 L oxygen but currently remains on room air  · Substituted Trelegy with [place] and Odalis  · Patient was noted to be wheezing on exam on 12/15    Decrease to 40 mg of IV Solu-Medrol daily from every 12 hours   · continue scheduled nebulizer treatments

## 2022-12-18 NOTE — ASSESSMENT & PLAN NOTE
Lab Results   Component Value Date    EGFR 46 12/18/2022    EGFR 44 12/17/2022    EGFR 41 12/16/2022    CREATININE 1 40 (H) 12/18/2022    CREATININE 1 44 (H) 12/17/2022    CREATININE 1 53 (H) 12/16/2022     Baseline creatinine appears to be around 1 3-1 6s    · Creatinine continues to remain at baseline  · Repeat lab work in AM

## 2022-12-18 NOTE — ASSESSMENT & PLAN NOTE
Patient presented with somnolence for 24 hours and generalized weakness  Reported a little cough and runny nose, denies sore throat/SOB/fever/chills  · Patient hospitalized in Nov '22 for symptomatic anemia, hemoglobin 7 1 on admission with positive stool guaiac, received 1 unit of PRBC, underwent EGD and colonoscopy both showed no evidence of bleeding  Outpatient capsule endoscopy was recommended which is still pending   Patient remained on Xarelto  · Initially - WBC 15 38, no bands, patient afebrile  · Lactic acid normal   Procalcitonin elevated  · Chest x-ray showed no evidence of pneumonia  · UA no evidence of infection  COVID-19, flu RSV negative  · CT chest showed -  Numerous new pulmonary nodules, concerning for mets; Mediastinal and hilar lymphadenopathy, concerning for mets; Small right pleural effusion with adjacent basal opacity, most likely atelectasis  Nodules could possibly be septic emboli  · Patient was given Zosyn in the ED, de-escalated to Rocephin  · Currently on Ancef 2 g every 8 hours for MSSA bacteremia  Plan for 6 weeks course  · ID input appreciated  · Echocardiogram showed EF of 60% with severe concentric hypertrophy, EF of 60% without any visible vegetations with severe attic stenosis  · Status post MARY which showed EF of 55% without any large vegetation but small vegetation could not be ruled out due to calcium and acoustic shadowing  · Unclear source-possible from skin translocation as patient noted to have multiple scabs  · Pacemaker removal was recommended by ID  per cardiology, pacemaker has been in place for 12 years and removal would be an extensive surgical procedure    Given multiple comorbidities cardiology recommended conservative approach and treatment with IV antibiotics  · Repeat blood cultures from 12/7/2022 negative

## 2022-12-18 NOTE — ASSESSMENT & PLAN NOTE
Wt Readings from Last 3 Encounters:   12/07/22 120 kg (265 lb)   11/22/22 121 kg (265 lb 10 5 oz)   10/20/22 120 kg (265 lb)     On Demadex 40 mg p o  Daily at home  · 2D echo in January 2022 showed normal EF, 65%, moderate concentric hypertrophy, dilated atriums, severe AS, mild to moderate AR, moderate MS  · Chest X-ray showed cardiomegaly with moderate congestive changes  · BNP was elevated at 14,000  · Demadex was initially held in the setting of sepsis and then later in the setting of worsening creatinine  Received dose of Lasix on 12/8 and 12/9 and 12/15  · Restarted on 20 mg of Demadex daily on 12/13    Currently on IV Bumex twice daily as patient appears volume overloaded with likely transition to [facility] tomorrow

## 2022-12-18 NOTE — PHYSICAL THERAPY NOTE
PT TREATMENT       12/18/22 0845   PT Last Visit   PT Visit Date 12/18/22   Note Type   Note Type Treatment   Pain Assessment   Pain Assessment Tool 0-10   Pain Score 8   Pain Location/Orientation Location: Neck   Pain Onset/Description Onset: Ongoing   Effect of Pain on Daily Activities limit mobility, poor sitting posture   Patient's Stated Pain Goal No pain   Hospital Pain Intervention(s) Rest;Repositioned   Multiple Pain Sites No   Restrictions/Precautions   Weight Bearing Precautions Per Order No   Other Precautions Bed Alarm; Chair Alarm; Fall Risk;Pain;Cognitive   General   Chart Reviewed Yes   Family/Caregiver Present Yes  (dtr at bedside)   Cognition   Arousal/Participation Alert   Attention Attends with cues to redirect   Orientation Level Oriented to person;Oriented to place   Subjective   Subjective "no i don't want to sit in the chair"   Bed Mobility   Additional Comments Pt received sitting on edge of bed   Transfers   Sit to Stand 3  Moderate assistance   Additional items Assist x 2;Verbal cues; Increased time required   Stand to Sit 3  Moderate assistance   Additional items Assist x 2;Verbal cues; Increased time required   Stand pivot 4  Minimal assistance   Additional items Assist x 2;Verbal cues; Increased time required   Ambulation/Elevation   Gait pattern Foward flexed; Wide CASSIE; Short stride; Step to   Gait Assistance 4  Minimal assist   Additional items Assist x 1;Verbal cues   Assistive Device Rolling walker   Distance 2 feet from bed to chair   Stair Management Assistance Not tested   Balance   Static Sitting Fair   Dynamic Sitting Fair -   Static Standing Poor +   Dynamic Standing Poor +   Ambulatory Poor +   Activity Tolerance   Activity Tolerance Patient tolerated treatment well;Patient limited by fatigue;Patient limited by pain   Nurse Made Aware yes RN Josi Record   Assessment   Prognosis Good   Problem List Decreased strength;Decreased range of motion;Decreased endurance; Impaired balance;Decreased mobility; Decreased cognition; Impaired judgement;Decreased safety awareness;Pain   Assessment Pt agreeable to PT session this AM  Pt received sitting at edge of bed for forward flexed position, excessive ccervical flexion with difficulty correcting posture  Pt assisted to bedside chair and repositioned for comfort - encouraged patient to improve neck position to more neutral posture with support of pillows + chair, verbalizes understanding but requires reinforcement  The patient's AM-PAC Basic Mobility Inpatient Short Form Raw Score is 11  A Raw score of less than or equal to 16 suggests the patient may benefit from discharge to post-acute rehabilitation services  Please also refer to the recommendation of the Physical Therapist for safe discharge planning  Goals   Patient Goals to get better   Plan   Treatment/Interventions Functional transfer training;ADL retraining;LE strengthening/ROM; Therapeutic exercise; Endurance training;Bed mobility;Gait training;Spoke to nursing   Progress Slow progress, decreased activity tolerance   PT Frequency Other (Comment)  (5x/week)   Recommendation   PT Discharge Recommendation Post acute rehabilitation services   AM-PAC Basic Mobility Inpatient   Turning in Bed Without Bedrails 2   Lying on Back to Sitting on Edge of Flat Bed 2   Moving Bed to Chair 2   Standing Up From Chair 2   Walk in Room 2   Climb 3-5 Stairs 1   Basic Mobility Inpatient Raw Score 11   Basic Mobility Standardized Score 30 25   Highest Level Of Mobility   JH-HLM Goal 4: Move to chair/commode   JH-HLM Achieved 4: Move to chair/commode   Education   Education Provided Mobility training;Home exercise program   Patient Reinforcement needed;Demonstrates acceptance/verbal understanding   End of Consult   Patient Position at End of Consult Bedside chair;Bed/Chair alarm activated; All needs within reach   [facility] Number  Danika Mohave [id number]

## 2022-12-19 ENCOUNTER — TELEPHONE (OUTPATIENT)
Dept: NEPHROLOGY | Facility: CLINIC | Age: 82
End: 2022-12-19

## 2022-12-19 LAB
ALBUMIN SERPL BCP-MCNC: 1.8 G/DL (ref 3.5–5)
ALP SERPL-CCNC: 92 U/L (ref 46–116)
ALT SERPL W P-5'-P-CCNC: 9 U/L (ref 12–78)
ANION GAP SERPL CALCULATED.3IONS-SCNC: 4 MMOL/L (ref 4–13)
AST SERPL W P-5'-P-CCNC: 41 U/L (ref 5–45)
BILIRUB SERPL-MCNC: 0.44 MG/DL (ref 0.2–1)
BUN SERPL-MCNC: 38 MG/DL (ref 5–25)
CALCIUM ALBUM COR SERPL-MCNC: 10.1 MG/DL (ref 8.3–10.1)
CALCIUM SERPL-MCNC: 8.3 MG/DL (ref 8.3–10.1)
CHLORIDE SERPL-SCNC: 102 MMOL/L (ref 96–108)
CO2 SERPL-SCNC: 34 MMOL/L (ref 21–32)
CREAT SERPL-MCNC: 1.3 MG/DL (ref 0.6–1.3)
GFR SERPL CREATININE-BSD FRML MDRD: 50 ML/MIN/1.73SQ M
GLUCOSE SERPL-MCNC: 117 MG/DL (ref 65–140)
HCT VFR BLD AUTO: 26.9 % (ref 36.5–49.3)
HGB BLD-MCNC: 7.8 G/DL (ref 12–17)
POTASSIUM SERPL-SCNC: 3.8 MMOL/L (ref 3.5–5.3)
PROT SERPL-MCNC: 6.5 G/DL (ref 6.4–8.4)
SODIUM SERPL-SCNC: 140 MMOL/L (ref 135–147)

## 2022-12-19 RX ADMIN — LEVALBUTEROL HYDROCHLORIDE 1.25 MG: 1.25 SOLUTION, CONCENTRATE RESPIRATORY (INHALATION) at 19:51

## 2022-12-19 RX ADMIN — CEFAZOLIN SODIUM 2000 MG: 2 SOLUTION INTRAVENOUS at 05:16

## 2022-12-19 RX ADMIN — ISODIUM CHLORIDE 3 ML: 0.03 SOLUTION RESPIRATORY (INHALATION) at 19:51

## 2022-12-19 RX ADMIN — ACETYLCYSTEINE 600 MG: 200 SOLUTION ORAL; RESPIRATORY (INHALATION) at 13:40

## 2022-12-19 RX ADMIN — ALLOPURINOL 200 MG: 100 TABLET ORAL at 08:33

## 2022-12-19 RX ADMIN — ACETAMINOPHEN 975 MG: 325 TABLET, FILM COATED ORAL at 21:00

## 2022-12-19 RX ADMIN — RIVAROXABAN 15 MG: 15 TABLET, FILM COATED ORAL at 08:15

## 2022-12-19 RX ADMIN — ISODIUM CHLORIDE 3 ML: 0.03 SOLUTION RESPIRATORY (INHALATION) at 13:40

## 2022-12-19 RX ADMIN — UMECLIDINIUM 1 PUFF: 62.5 AEROSOL, POWDER ORAL at 08:34

## 2022-12-19 RX ADMIN — PANTOPRAZOLE SODIUM 40 MG: 40 TABLET, DELAYED RELEASE ORAL at 05:16

## 2022-12-19 RX ADMIN — LEVALBUTEROL HYDROCHLORIDE 1.25 MG: 1.25 SOLUTION, CONCENTRATE RESPIRATORY (INHALATION) at 13:40

## 2022-12-19 RX ADMIN — SENNOSIDES 8.6 MG: 8.6 TABLET, FILM COATED ORAL at 21:00

## 2022-12-19 RX ADMIN — LIDOCAINE 5% 1 PATCH: 700 PATCH TOPICAL at 08:34

## 2022-12-19 RX ADMIN — ACETAMINOPHEN 975 MG: 325 TABLET, FILM COATED ORAL at 05:16

## 2022-12-19 RX ADMIN — DULOXETINE HYDROCHLORIDE 60 MG: 60 CAPSULE, DELAYED RELEASE ORAL at 17:22

## 2022-12-19 RX ADMIN — CEFAZOLIN SODIUM 2000 MG: 2 SOLUTION INTRAVENOUS at 21:00

## 2022-12-19 RX ADMIN — FLUTICASONE FUROATE AND VILANTEROL TRIFENATATE 1 PUFF: 200; 25 POWDER RESPIRATORY (INHALATION) at 08:34

## 2022-12-19 RX ADMIN — LEVALBUTEROL HYDROCHLORIDE 1.25 MG: 1.25 SOLUTION, CONCENTRATE RESPIRATORY (INHALATION) at 07:21

## 2022-12-19 RX ADMIN — BUMETANIDE 1 MG: 0.25 INJECTION INTRAMUSCULAR; INTRAVENOUS at 17:22

## 2022-12-19 RX ADMIN — BUMETANIDE 1 MG: 0.25 INJECTION INTRAMUSCULAR; INTRAVENOUS at 08:33

## 2022-12-19 RX ADMIN — CEFAZOLIN SODIUM 2000 MG: 2 SOLUTION INTRAVENOUS at 13:54

## 2022-12-19 RX ADMIN — ACETYLCYSTEINE 600 MG: 200 SOLUTION ORAL; RESPIRATORY (INHALATION) at 07:21

## 2022-12-19 RX ADMIN — ISOSORBIDE MONONITRATE 30 MG: 30 TABLET, EXTENDED RELEASE ORAL at 08:33

## 2022-12-19 RX ADMIN — DOCUSATE SODIUM 100 MG: 100 CAPSULE, LIQUID FILLED ORAL at 08:33

## 2022-12-19 RX ADMIN — Medication 250 MG: at 17:22

## 2022-12-19 RX ADMIN — ACETYLCYSTEINE 600 MG: 200 SOLUTION ORAL; RESPIRATORY (INHALATION) at 19:51

## 2022-12-19 RX ADMIN — ATORVASTATIN CALCIUM 40 MG: 40 TABLET, FILM COATED ORAL at 15:57

## 2022-12-19 RX ADMIN — ISODIUM CHLORIDE 3 ML: 0.03 SOLUTION RESPIRATORY (INHALATION) at 07:21

## 2022-12-19 RX ADMIN — FINASTERIDE 5 MG: 5 TABLET, FILM COATED ORAL at 08:33

## 2022-12-19 RX ADMIN — DOCUSATE SODIUM 100 MG: 100 CAPSULE, LIQUID FILLED ORAL at 21:00

## 2022-12-19 RX ADMIN — POLYETHYLENE GLYCOL 3350 17 G: 17 POWDER, FOR SOLUTION ORAL at 08:35

## 2022-12-19 RX ADMIN — Medication 250 MG: at 08:33

## 2022-12-19 RX ADMIN — METHYLPREDNISOLONE SODIUM SUCCINATE 40 MG: 40 INJECTION, POWDER, FOR SOLUTION INTRAMUSCULAR; INTRAVENOUS at 08:33

## 2022-12-19 RX ADMIN — GABAPENTIN 100 MG: 100 CAPSULE ORAL at 21:00

## 2022-12-19 RX ADMIN — DULOXETINE HYDROCHLORIDE 60 MG: 60 CAPSULE, DELAYED RELEASE ORAL at 08:33

## 2022-12-19 RX ADMIN — ACETAMINOPHEN 975 MG: 325 TABLET, FILM COATED ORAL at 13:54

## 2022-12-19 NOTE — TELEPHONE ENCOUNTER
----- Message from Corrinne Ishihara, MD sent at 12/18/2022  8:25 AM EST -----  [facility],    Mr Jeffrey Peterson will be discharged from our hospital in the next few days and he will need a follow-up appointment with BMP within 3 weeks of discharge    Thanks      Ana Christy

## 2022-12-19 NOTE — PROGRESS NOTES
Progress Note - Infectious Disease   Aman Weinstein 80 y o  male MRN: [id number]  Unit/Bed#: 1990 [facility] Encounter: [id number]      Impression/Plan:  1  MSSA bacteremia  POA, 2/2 admission blood cultures growing MSSA  Unclear source, may be skin translocation as the patient has multiple scabs on his arms and legs  This is complicated by the presence of a PPM  TTE with AV and MV thickening, no clear vegetations  MARY with no obvious large vegetations, but given significant calcification cannot rule out small lesion  CT chest with numerous pulmonary nodules, could be septic emboli vs less likely metastatic disease  This does raise concern for a primary endovascular source of infection  Discussed treatment options with the family and Cardiology  PPM removal is recommended in the setting of staph bacteremia regardless of the primary source, however the pacemaker has been in place 12 years and removal would be an extensive surgical procedure  Given his comorbidities, there are risks to surgery  His family would like to pursue a more conservative approach with antibiotics before any surgery  Repeat blood cultures no growth  -continues IV Cefazolin 2g q8hr  -Plan to complete a 6 week course of antibiotics from blood culture clearance through 1/17/23 (script provided to CM)  -Weekly CBC with differential and BMP on IV antibiotics  -will need surveillance blood cultures 2 weeks after completion of antibiotic course  -if there is recurrence the patient will require pacemaker extraction  -outpatient ID follow-up in 2 weeks from discharge - office rescheduling      2  Weakness, encephalopathy  Likely toxic metabolic due to infection as above  Karlos Crowell 92 showed a hypodensity in the right parieto-occipital region most likely a chronic infarct or metastatic lesion  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   Encephalopathy has improved  The patient appears depressed with flat affect    Noted weakness holding his head up today, but no neck pain  CT of the cervical spine shows no abnormalities  -continue treatment of infection as above  -neurology follow up  -monitor mental status  -PT/OT     3  Leukocytosis  Likely due to #1 above  Patient is afebrile, hemodynamically stable  WBC count stable  -continue antibiotics as above  -monitor WBC count, fever curve     4  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  Complicated by MSSA bacteremia  MARY no clear vegetations   -continue management as above     5  Pulmonary nodules and adenopathy  CT chest shows numerous new pulmonary nodules and mediastinal and hilar lymphadenopathy, concerning for metastases however in setting of staph aureus bacteremia could be septic emboli  Patient had a colonoscopy last month without abnormalities  He is a long term smoker  -pulmonary consulted  -will need serial imaging after 4-6 weeks of antibiotics; if persistent will require work up for malignancy     6  CKD  Creatinine stable at baseline  CrCl 51%  -Antibiotics dose adjusted as needed      7  Shortness of breath  PCXR with pulmonary edema and lateral pleural effusions and atelectasis  Improved  -Diuresis per primary care team    I have discussed the above management plan in detail with Dr David Holliday and the patient       Antibiotics:  Cefazolin - day 13 from 1st negative blood cultures     Subjective:  Patient has no fever, chills, sweats; no nausea, vomiting, diarrhea; no cough, improved shortness of breath; no pain complaints at present  No new symptoms      Objective:  Vitals:  Temp:  [96 8 °F (36 °C)-97 4 °F (36 3 °C)] 97 4 °F (36 3 °C)  HR:  [70-74] 70  Resp:  [18-22] 20  BP: (112-171)/(71-76) 130/75  SpO2:  [94 %-100 %] 94 %  Temp (24hrs), Av °F (36 1 °C), Min:96 8 °F (36 °C), Max:97 4 °F (36 3 °C)  Current: Temperature: (!) 97 4 °F (36 3 °C)    Physical Exam:   General Appearance:  80year old male, chronically debilitated, OOB to chair   HEENT: Atraumatic normocephalic   Throat: Oropharynx moist     Pulmonary: Decreased breath sounds clearer throughout, on RA statting 94%   Cardiac:  RRR, pacer site nontender   Abdomen:   Soft, non-tender, non-distended   Extremities: No edema   : No denney, no SPT   Psychiatric: Awake, cooperative   Skin: No new rashes  Right chest PICC site nontender  Labs, Imaging, & Other studies:   All pertinent labs and imaging studies were personally reviewed  Results from last 7 days   Lab Units 12/19/22  0517 12/18/22  0559 12/17/22  0457 12/16/22  0544 12/15/22  0523   WBC Thousand/uL  --  10 35*  --  8 96 7 55   HEMOGLOBIN g/dL 7 8* 8 3* 7 7* 7 8* 7 8*   PLATELETS Thousands/uL  --  245  --  212 210     Results from last 7 days   Lab Units 12/19/22 0517 12/18/22  0559 12/17/22 0457 12/16/22  0544 12/15/22  0523   SODIUM mmol/L 140 136 134*   < > 131*   POTASSIUM mmol/L 3 8 4 3 4 3   < > 4 1   CHLORIDE mmol/L 102 98 97   < > 95*   CO2 mmol/L 34* 32 33*   < > 30   BUN mg/dL 38* 40* 41*   < > 48*   CREATININE mg/dL 1 30 1 40* 1 44*   < > 1 69*   EGFR ml/min/1 73sq m 50 46 44   < > 37   CALCIUM mg/dL 8 3 8 6 8 4   < > 8 4   AST U/L 41  --   --   --  46*   ALT U/L 9*  --   --   --  9*   ALK PHOS U/L 92  --   --   --  118*    < > = values in this interval not displayed

## 2022-12-19 NOTE — PHYSICAL THERAPY NOTE
PT TREATMENT     12/19/22 1040   Note Type   Note Type Treatment   Pain Assessment   Pain Assessment Tool 0-10   Pain Score No Pain   Restrictions/Precautions   Other Precautions Fall Risk; Chair Alarm; Bed Alarm   General   Chart Reviewed Yes   Cognition   Orientation Level Oriented to person;Oriented to place; Disoriented to situation   Following Commands Follows one step commands without difficulty   Subjective   Subjective "I'm so tired, but I want to go home "  "Am I going home today?"   Bed Mobility   Supine to Sit 4  Minimal assistance   Additional items LE management; Increased time required; Bedrails   Sit to Supine 4  Minimal assistance   Additional items LE management; Increased time required; Bedrails   Transfers   Sit to Stand 4  Minimal assistance   Additional items Increased time required   Stand to Sit 4  Minimal assistance   Additional items Increased time required   Stand pivot 4  Minimal assistance   Additional items Increased time required  (with walker)   Ambulation/Elevation   Gait pattern   (very flexed posture)   Gait Assistance 4  Minimal assist   Assistive Device Rolling walker   Distance sidestepping x 5 feet along the bed   Balance   Static Sitting Fair   Static Standing Fair   Activity Tolerance   Activity Tolerance Patient limited by fatigue   Exercises   Therapeutic exercise Sit to stand from the edge of the bed x 4 trials with min assist   Pt stood each time with min assist x 10 seconds before needing to sit due to fatigue  Assessment   Prognosis Good   Problem List Decreased strength;Decreased endurance;Decreased mobility; Impaired balance;Obesity   Assessment Patient remains very weak status post 2-week hospitalization  Patient is able to stand with min assist and take a few steps with a rolling walker but then needs to sit due to fatigue  Patient sits with a severely flexed thoracic and cervical spine which he is able to correct 75% with verbal cues but only briefly    Patient put back to bed after physical therapy treatment due to extreme fatigue  The patient's AM-PAC Basic Mobility Inpatient Short Form Raw Score is 14  A Raw score of less than or equal to 16 suggests the patient may benefit from discharge to post-acute rehabilitation services  Plan   Treatment/Interventions LE strengthening/ROM; Functional transfer training;ADL retraining;Elevations; Therapeutic exercise; Endurance training;Equipment eval/education; Bed mobility;Gait training   Progress Slow progress, decreased activity tolerance   PT Frequency Other (Comment)  (5x/w)   Recommendation   PT Discharge Recommendation Post acute rehabilitation services   AM-PAC Basic Mobility Inpatient   Turning in Bed Without Bedrails 3   Lying on Back to Sitting on Edge of Flat Bed 3   Moving Bed to Chair 3   Standing Up From Chair 3   Walk in Room 1   Climb 3-5 Stairs 1   Basic Mobility Inpatient Raw Score 14   Basic Mobility Standardized Score 35 55   Highest Level Of Mobility   JH-HLM Goal 4: Move to chair/commode   JH-HLM Achieved 5: Stand (1 or more minutes)   Licensure   [place] License Number  Jc [id number]

## 2022-12-19 NOTE — CASE MANAGEMENT
Case Management Progress Note    Patient name Lisa Jenkins  Location 18 [facility] 221/2 Raleigh Coates MRN [id number]  : 1940 Date 2022       LOS (days): 13  Geometric Mean LOS (GMLOS) (days): 5 00  Days to GMLOS:-8 1        OBJECTIVE:     Current admission status: Inpatient  Preferred Pharmacy:   [facility] [address] Dorita Mejia, [phone] [facility] [address] [facility] [address] [facility],  [address] [address]  Phone: [phone] Fax: [phone]    [facility] [address] [facility], [address] [place] [address]  Phone: [phone] Fax: Leonel, [facility] Chris Del Remedio  [address] [place] [address]  Phone: [phone] Fax: [phone]    [facility] [address] [address]  Phone: [phone] Fax: [phone]    Primary Care Provider: Walter Howard DO    Primary Insurance: MEDICARE  Secondary Insurance: COMMERCIAL MISCELLANEOUS    PROGRESS NOTE:    SW reached out to [facility] to confirm bed availability  Maria G Gannon states that they are trying to move beds around to accommodate patient and will call SW back as soon as they can with updated day/time they can admit patient  Attending notified

## 2022-12-19 NOTE — SPEECH THERAPY NOTE
Speech/Language Pathology Progress Note    Patient Name: Ranjeet Youssef  CNIFV'S Date: 12/19/2022     Subjective:  "Take a look at that plate of meat (large portion of meatloaf along with mashed potatoes/gravy" (pt took picture to show his family)  Objective:  Pt was seen for diagnostic dysphagia therapy after review of records to ensure tolerance of least diet (currently on [facility] soft texture 2* reduced sustained alertness/strength)  Pt was alert and positioned upright in chair (leaning forward on arrival)  Dtr Judi Wright called at beginning of session and visited by RN later in session (pt with improved affect and more interactive with more stimulation today)    Pt was assessed with applesauce, min of meatloaf and thin liquid  Dentures were in place  Mastication was timely and effective  Bolus formation and transfer were effective  Swallow initiation appeared prompt  Laryngeal rise was good by palpation  No coughing, throat clearing, c/o stasis, multiple swallows, change in vocal quality noted c po intake today  Assessment:  Pt tolerated current diet items with no s/s or c/o dysphagia today (took small amts)  Improved alertness and affect (more animated and interactive today)  Aware of plan for additional 3 weeks of IV antibiotics    Plan/Recommendations:  Continue current diet (plan for assessment of upgraded food texture min of 2x weekly as MS improves)       Manuel Cowden [address] [place] [address] [phone]

## 2022-12-20 ENCOUNTER — TRANSITIONAL CARE MANAGEMENT (OUTPATIENT)
Dept: FAMILY MEDICINE CLINIC | Facility: CLINIC | Age: 82
End: 2022-12-20

## 2022-12-20 ENCOUNTER — TELEPHONE (OUTPATIENT)
Dept: FAMILY MEDICINE CLINIC | Facility: CLINIC | Age: 82
End: 2022-12-20

## 2022-12-20 VITALS
OXYGEN SATURATION: 95 % | BODY MASS INDEX: 34.01 KG/M2 | HEIGHT: 74 IN | WEIGHT: 265 LBS | HEART RATE: 74 BPM | SYSTOLIC BLOOD PRESSURE: 144 MMHG | RESPIRATION RATE: 18 BRPM | TEMPERATURE: 97.2 F | DIASTOLIC BLOOD PRESSURE: 72 MMHG

## 2022-12-20 LAB
ANION GAP SERPL CALCULATED.3IONS-SCNC: 5 MMOL/L (ref 4–13)
BUN SERPL-MCNC: 32 MG/DL (ref 5–25)
CALCIUM SERPL-MCNC: 8.2 MG/DL (ref 8.3–10.1)
CHLORIDE SERPL-SCNC: 103 MMOL/L (ref 96–108)
CO2 SERPL-SCNC: 34 MMOL/L (ref 21–32)
CREAT SERPL-MCNC: 1.25 MG/DL (ref 0.6–1.3)
FLUAV RNA RESP QL NAA+PROBE: NEGATIVE
FLUBV RNA RESP QL NAA+PROBE: NEGATIVE
GFR SERPL CREATININE-BSD FRML MDRD: 53 ML/MIN/1.73SQ M
GLUCOSE SERPL-MCNC: 105 MG/DL (ref 65–140)
HCT VFR BLD AUTO: 27.9 % (ref 36.5–49.3)
HGB BLD-MCNC: 8.3 G/DL (ref 12–17)
POTASSIUM SERPL-SCNC: 3.6 MMOL/L (ref 3.5–5.3)
RSV RNA RESP QL NAA+PROBE: NEGATIVE
SARS-COV-2 RNA RESP QL NAA+PROBE: NEGATIVE
SODIUM SERPL-SCNC: 142 MMOL/L (ref 135–147)

## 2022-12-20 RX ORDER — CEFAZOLIN SODIUM 2 G/50ML
2000 SOLUTION INTRAVENOUS EVERY 8 HOURS
Refills: 0
Start: 2022-12-20 | End: 2023-01-17

## 2022-12-20 RX ORDER — SACCHAROMYCES BOULARDII 250 MG
250 CAPSULE ORAL 2 TIMES DAILY
Refills: 0
Start: 2022-12-20 | End: 2023-01-17

## 2022-12-20 RX ORDER — LIDOCAINE 50 MG/G
1 PATCH TOPICAL DAILY
Refills: 0
Start: 2022-12-21

## 2022-12-20 RX ORDER — SENNOSIDES 8.6 MG
8.6 TABLET ORAL
Refills: 0
Start: 2022-12-20

## 2022-12-20 RX ORDER — ATORVASTATIN CALCIUM 40 MG/1
40 TABLET, FILM COATED ORAL
Refills: 0
Start: 2022-12-20

## 2022-12-20 RX ORDER — ACETYLCYSTEINE 200 MG/ML
3 SOLUTION ORAL; RESPIRATORY (INHALATION)
Refills: 0
Start: 2022-12-20 | End: 2022-12-30 | Stop reason: SDUPTHER

## 2022-12-20 RX ORDER — LEVALBUTEROL 1.25 MG/.5ML
1.25 SOLUTION, CONCENTRATE RESPIRATORY (INHALATION)
Refills: 0
Start: 2022-12-20 | End: 2022-12-30 | Stop reason: SDUPTHER

## 2022-12-20 RX ORDER — PREDNISONE 10 MG/1
TABLET ORAL
Qty: 30 TABLET | Refills: 0
Start: 2022-12-20 | End: 2022-12-29

## 2022-12-20 RX ORDER — DOCUSATE SODIUM 100 MG/1
100 CAPSULE, LIQUID FILLED ORAL 2 TIMES DAILY
Refills: 0
Start: 2022-12-20

## 2022-12-20 RX ORDER — METHOCARBAMOL 500 MG/1
500 TABLET, FILM COATED ORAL EVERY 6 HOURS PRN
Refills: 0
Start: 2022-12-20

## 2022-12-20 RX ORDER — SODIUM CHLORIDE FOR INHALATION 0.9 %
3 VIAL, NEBULIZER (ML) INHALATION
Refills: 0
Start: 2022-12-20

## 2022-12-20 RX ADMIN — UMECLIDINIUM 1 PUFF: 62.5 AEROSOL, POWDER ORAL at 09:25

## 2022-12-20 RX ADMIN — DULOXETINE HYDROCHLORIDE 60 MG: 60 CAPSULE, DELAYED RELEASE ORAL at 09:24

## 2022-12-20 RX ADMIN — RIVAROXABAN 15 MG: 15 TABLET, FILM COATED ORAL at 09:24

## 2022-12-20 RX ADMIN — ACETYLCYSTEINE 600 MG: 200 SOLUTION ORAL; RESPIRATORY (INHALATION) at 08:10

## 2022-12-20 RX ADMIN — ALLOPURINOL 200 MG: 100 TABLET ORAL at 09:24

## 2022-12-20 RX ADMIN — CEFAZOLIN SODIUM 2000 MG: 2 SOLUTION INTRAVENOUS at 05:09

## 2022-12-20 RX ADMIN — LIDOCAINE 5% 1 PATCH: 700 PATCH TOPICAL at 09:24

## 2022-12-20 RX ADMIN — Medication 250 MG: at 09:24

## 2022-12-20 RX ADMIN — LEVALBUTEROL HYDROCHLORIDE 1.25 MG: 1.25 SOLUTION, CONCENTRATE RESPIRATORY (INHALATION) at 08:10

## 2022-12-20 RX ADMIN — ISODIUM CHLORIDE 3 ML: 0.03 SOLUTION RESPIRATORY (INHALATION) at 08:10

## 2022-12-20 RX ADMIN — PANTOPRAZOLE SODIUM 40 MG: 40 TABLET, DELAYED RELEASE ORAL at 05:09

## 2022-12-20 RX ADMIN — ISOSORBIDE MONONITRATE 30 MG: 30 TABLET, EXTENDED RELEASE ORAL at 09:24

## 2022-12-20 RX ADMIN — FLUTICASONE FUROATE AND VILANTEROL TRIFENATATE 1 PUFF: 200; 25 POWDER RESPIRATORY (INHALATION) at 10:18

## 2022-12-20 RX ADMIN — METHYLPREDNISOLONE SODIUM SUCCINATE 40 MG: 40 INJECTION, POWDER, FOR SOLUTION INTRAMUSCULAR; INTRAVENOUS at 09:24

## 2022-12-20 RX ADMIN — FINASTERIDE 5 MG: 5 TABLET, FILM COATED ORAL at 09:24

## 2022-12-20 NOTE — ASSESSMENT & PLAN NOTE
On Trelegy, albuterol nebulizer p r n  At home  · Was initially on 2 L oxygen but currently remains on room air  · Substituted Trelegy with Hayden Smith and Incyurise  · Patient was noted to be wheezing on exam on 12/15  Continue 40 mg of IV Solu-Medrol daily, decreased from from every 12 hours    Transition to prednisone tomorrow  · continue scheduled nebulizer treatments

## 2022-12-20 NOTE — NURSING NOTE
Pt discharged to [facility] STEPHENIE CRUZ reviewed with pt  Report given to transport team  Pt leaving with R chest PICC for long-term abx  Pt is stable at time of d/c

## 2022-12-20 NOTE — PROGRESS NOTES
Progress Note - Infectious Disease   Jackie Pugh 80 y o  male MRN: [id number]  Unit/Bed#: 1990 [facility] Encounter: [id number]      Impression/Plan:  1  MSSA bacteremia  POA, 2/2 admission blood cultures growing MSSA  Unclear source, may be skin translocation as the patient has multiple scabs on his arms and legs  This is complicated by the presence of a PPM  TTE with AV and MV thickening, no clear vegetations  MARY with no obvious large vegetations, but given significant calcification cannot rule out small lesion  CT chest with numerous pulmonary nodules, could be septic emboli vs less likely metastatic disease  This does raise concern for a primary endovascular source of infection  Discussed treatment options with the family and Cardiology  PPM removal is recommended in the setting of staph bacteremia regardless of the primary source, however the pacemaker has been in place 12 years and removal would be an extensive surgical procedure  Given his comorbidities, there are risks to surgery  His family would like to pursue a more conservative approach with antibiotics before any surgery  Repeat blood cultures no growth  -continues IV Cefazolin 2g q8hr  -Plan to complete a 6 week course of antibiotics from blood culture clearance through 1/17/23 (script provided to CM)  -Weekly CBC with differential and BMP on IV antibiotics  -will need surveillance blood cultures 2 weeks after completion of antibiotic course  -if there is recurrence, the patient will require pacemaker extraction  -outpatient ID follow-up on 1/3/23 at 12 pm  - Dr Ileana Martin attending physician        2  Weakness, encephalopathy  Likely toxic metabolic due to infection as above  Karlos Crowell 92 showed a hypodensity in the right parieto-occipital region most likely a chronic infarct or metastatic lesion  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   Encephalopathy has improved  The patient appears depressed with flat affect    Noted weakness holding his head up today, but no neck pain  CT of the cervical spine shows no abnormalities  -continue treatment of infection as above  -neurology follow up  -monitor mental status  -PT/OT     3  Leukocytosis  Likely due to #1 above  Patient is afebrile, hemodynamically stable  WBC count stable  -continue antibiotics as above  -monitor WBC count, fever curve     4  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  Complicated by MSSA bacteremia  MARY no clear vegetations   -continue management as above     5  Pulmonary nodules and adenopathy  CT chest shows numerous new pulmonary nodules and mediastinal and hilar lymphadenopathy, concerning for metastases however in setting of staph aureus bacteremia could be septic emboli  Patient had a colonoscopy last month without abnormalities  He is a long term smoker  -pulmonary consulted  -will need serial imaging after 4-6 weeks of antibiotics; if persistent will require work up for malignancy     6  CKD  Creatinine stable at baseline  CrCl > 50%  -Antibiotics dose adjusted as needed      7  Shortness of breath  PCXR with pulmonary edema and lateral pleural effusions and atelectasis  Improved  -Diuresis per primary care team    I have discussed the above management plan in detail with primary care team, daughter at bedside and the patient  Discharge disposition planning in progress for today to [facility] rehab facility      Antibiotics:  Cefazolin - day 14 from 1st negative blood cultures     Subjective:  Patient has no fever, chills, sweats; no nausea, vomiting, diarrhea; no cough, improved shortness of breath; no pain complaints at present  No new symptoms      Objective:  Vitals:  Temp:  [96 3 °F (35 7 °C)-97 2 °F (36 2 °C)] 97 2 °F (36 2 °C)  HR:  [67-75] 74  Resp:  [18] 18  BP: (118-144)/(57-72) 144/72  SpO2:  [95 %-100 %] 95 %  Temp (24hrs), Av 8 °F (36 °C), Min:96 3 °F (35 7 °C), Max:97 2 °F (36 2 °C)  Current: Temperature: (!) 97 2 °F (36 2 °C)    Physical Exam:   General Appearance:  80year old male, chronically debilitated, OOB to chair, Lidoderm patch on back of neck  HEENT: Atraumatic normocephalic   Throat: Oropharynx moist     Pulmonary:    Unlabored breathing without accessory muscle use, statting 95% on RA   Cardiac:  RRR, pacer site nontender   Abdomen:   Soft, non-tender, non-distended   Extremities: No edema   : No denney, no SPT   Psychiatric: Awake, cooperative   Skin: No new rashes  Right chest PICC site nontender  Labs, Imaging, & Other studies:   All pertinent labs and imaging studies were personally reviewed  Results from last 7 days   Lab Units 12/20/22  0509 12/19/22  0517 12/18/22  0559 12/17/22  0457 12/16/22  0544 12/15/22  0523   WBC Thousand/uL  --   --  10 35*  --  8 96 7 55   HEMOGLOBIN g/dL 8 3* 7 8* 8 3*   < > 7 8* 7 8*   PLATELETS Thousands/uL  --   --  245  --  212 210    < > = values in this interval not displayed  Results from last 7 days   Lab Units 12/20/22  0509 12/19/22  0517 12/18/22  0559 12/16/22  0544 12/15/22  0523   SODIUM mmol/L 142 140 136   < > 131*   POTASSIUM mmol/L 3 6 3 8 4 3   < > 4 1   CHLORIDE mmol/L 103 102 98   < > 95*   CO2 mmol/L 34* 34* 32   < > 30   BUN mg/dL 32* 38* 40*   < > 48*   CREATININE mg/dL 1 25 1 30 1 40*   < > 1 69*   EGFR ml/min/1 73sq m 53 50 46   < > 37   CALCIUM mg/dL 8 2* 8 3 8 6   < > 8 4   AST U/L  --  41  --   --  46*   ALT U/L  --  9*  --   --  9*   ALK PHOS U/L  --  92  --   --  118*    < > = values in this interval not displayed

## 2022-12-20 NOTE — ASSESSMENT & PLAN NOTE
Patient presented with somnolence for 24 hours and generalized weakness  Reported a little cough and runny nose, denies sore throat/SOB/fever/chills  · Patient hospitalized in Nov '22 for symptomatic anemia, hemoglobin 7 1 on admission with positive stool guaiac, received 1 unit of PRBC, underwent EGD and colonoscopy both showed no evidence of bleeding  Outpatient capsule endoscopy was recommended which is still pending   Patient remained on Xarelto  · Initially - WBC 15 38, no bands, patient afebrile  · Lactic acid normal   Procalcitonin elevated  · Chest x-ray showed no evidence of pneumonia  · UA no evidence of infection  COVID-19, flu RSV negative  · CT chest showed -  Numerous new pulmonary nodules, concerning for mets; Mediastinal and hilar lymphadenopathy, concerning for mets; Small right pleural effusion with adjacent basal opacity, most likely atelectasis  Nodules could possibly be septic emboli  · Patient was given Zosyn in the ED, de-escalated to Rocephin  · Currently on Ancef 2 g every 8 hours for MSSA bacteremia  Plan for 6 weeks course  · ID input appreciated  · Echo showed EF of 60% with severe concentric hypertrophy, EF of 60% without any visible vegetations with severe aortic stenosis  · Status post MARY which showed EF of 55% without any large vegetation but small vegetation could not be ruled out due to calcium and acoustic shadowing  · Unclear source-possible from skin translocation as patient noted to have multiple scabs  · Pacemaker removal was recommended by ID  per cardiology, pacemaker has been in place for 12 years and removal would be an extensive surgical procedure  Given multiple comorbidities cardiology recommended conservative approach and treatment with IV antibiotics  · Repeat blood cultures from 12/7/2022 negative  · Patient will need weekly CBC with differential and CMP while on antibiotics    Patient will also need blood cultures x2 2 weeks after completion of antibiotics

## 2022-12-20 NOTE — PROGRESS NOTES
[address]  Progress Note Jose Luis Arana 1940, 80 y o  male MRN: [id number]  Unit/Bed#: [address] [place] [address] Encounter: [id number]  Primary Care Provider: Ryan Resendiz DO   Date and time admitted to hospital: 12/5/2022  6:39 PM    * MSSA bacteremia  Assessment & Plan  Patient presented with somnolence for 24 hours and generalized weakness  Reported a little cough and runny nose, denies sore throat/SOB/fever/chills  · Patient hospitalized in Nov '22 for symptomatic anemia, hemoglobin 7 1 on admission with positive stool guaiac, received 1 unit of PRBC, underwent EGD and colonoscopy both showed no evidence of bleeding  Outpatient capsule endoscopy was recommended which is still pending   Patient remained on Xarelto  · Initially - WBC 15 38, no bands, patient afebrile  · Lactic acid normal   Procalcitonin elevated  · Chest x-ray showed no evidence of pneumonia  · UA no evidence of infection  COVID-19, flu RSV negative  · CT chest showed -  Numerous new pulmonary nodules, concerning for mets; Mediastinal and hilar lymphadenopathy, concerning for mets; Small right pleural effusion with adjacent basal opacity, most likely atelectasis  Nodules could possibly be septic emboli  · Patient was given Zosyn in the ED, de-escalated to Rocephin  · Currently on Ancef 2 g every 8 hours for MSSA bacteremia  Plan for 6 weeks course  · ID input appreciated  · Echo showed EF of 60% with severe concentric hypertrophy, EF of 60% without any visible vegetations with severe aortic stenosis  · Status post MARY which showed EF of 55% without any large vegetation but small vegetation could not be ruled out due to calcium and acoustic shadowing  · Unclear source-possible from skin translocation as patient noted to have multiple scabs  · Pacemaker removal was recommended by ID  per cardiology, pacemaker has been in place for 12 years and removal would be an extensive surgical procedure    Given multiple comorbidities cardiology recommended conservative approach and treatment with IV antibiotics  · Repeat blood cultures from 12/7/2022 negative    Acute encephalopathy  Assessment & Plan  Likely acute toxic metabolic encephalopathy due to underlying infection  Reported feeling sleepy and weak  · CT head - Hypodensity in the right parieto-occipital region which could be chronic infarct versus metastatic disease based on the CT of the chest  · Mental status has improved with treatment of sepsis  · ABG no hypercapnia  · Patient was initially worked up for possible stroke  · Could not get MRI of the brain due to incompatible pacemaker  · Was given full dose aspirin in the ED along with Lipitor 80 mg  · Hemoglobin A1c was 5 1  · Repeat CT head 12/12 - persistent small patchy hypodensities concerning for vasogenic edema related to metastatic foci  · PT/OT evaluation and treatment    Chronic diastolic heart failure [facility])  Assessment & Plan  Wt Readings from Last 3 Encounters:   12/07/22 120 kg (265 lb)   11/22/22 121 kg (265 lb 10 5 oz)   10/20/22 120 kg (265 lb)     On Demadex 40 mg p o  Daily at home  · 2D echo in January 2022 showed normal EF, 65%, moderate concentric hypertrophy, dilated atriums, severe AS, mild to moderate AR, moderate MS  · Chest X-ray showed cardiomegaly with moderate congestive changes  · BNP was elevated at 14,000  · Demadex was initially held in the setting of sepsis and then later in the setting of worsening creatinine  Received dose of Lasix on 12/8 and 12/9 and 12/15  · Restarted on 20 mg of Demadex daily on 12/13 --> IV Bumex twice daily due to volume overload  · IV Bumex discontinued and transition to [facility] tomorrow    Abnormal CAT scan  Assessment & Plan  CT chest - numerous new pulmonary nodules concerning for metastatic disease along with mediastinal and hilar lymphadenopathy and small right pleural effusion  · CT abdomen pelvis without contrast on 11/19/2022 was unremarkable    · CT head - hypodensity in the right parieto-occipital region - 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  Repeat CT head on 12/12 showed persistent hypodensities concerning for metastatic foci  · Discussed with neurology who was concerned for metastatic lesion on CT brain  Oncology was consulted  · CT with IV contrast showed that 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  · Patient did have a colonoscopy during prior hospitalization which showed no masses  · No history of malignancy as per family  · Pulmonary recommended IR guided lung biopsy  Lung nodules could also be related to septic emboli  Per ID, will need repeat CT scan in 3 months  If the nodules are improving patient might not need further work-up but if the nodules are persistent might need biopsy  Family in agreement with this plan  · Patient to follow-up with oncology after discharge      Elevated troponin  Assessment & Plan  Mild troponin elevation most likely non MI troponin elevation secondary to sepsis  · EKG showed paced rhythm per prior provider  · Also complained of some chest pain on 12/8/22 and repeat troponin remains around 700  · Repeat EKG showed some inferolateral T wave inversion per prior provider  · Suspected secondary to symptomatic anemia  Symptoms improved after 1 unit of PRBC transfusion      Hyponatremia  Assessment & Plan  Results from last 7 days   Lab Units 12/19/22  0517 12/18/22  0559 12/17/22  0457 12/16/22  0544 12/15/22  0523 12/14/22  0457 12/13/22  1559 12/13/22  0519   SODIUM mmol/L 140 136 134* 135 131* 129* 127* 128*     In the setting of volume overload  · Demadex was held in the setting of worsening creatinine but then restarted on 12/13  · IV Bumex twice daily, discontinued after today's evening dose  · Sodium level slowly within normal limits    Nephrology input appreciated  · Continue on 1200 mL fluid restriction    COPD (chronic obstructive pulmonary disease) ([facility] )  Assessment & Plan  On Trelegy, albuterol nebulizer p r n  At home  · Was initially on 2 L oxygen but currently remains on room air  · Substituted Trelegy with Saleem Willisan and Incruse  · Patient was noted to be wheezing on exam on 12/15  Continue 40 mg of IV Solu-Medrol daily, decreased from from every 12 hours  Transition to prednisone tomorrow  · continue scheduled nebulizer treatments      Depression with anxiety  Assessment & Plan  Continue Cymbalta    Anemia  Assessment & Plan  Baseline hemoglobin appears to be around 7-9s  · No evidence of active bleeding  · Status post 1 unit PRBC transfusion during this admission  · Stool occult blood was negative x1  · Hemoglobin overall stable in 7-8s range    Results from last 7 days   Lab Units 12/19/22  0517 12/18/22  0559 12/17/22  0457 12/16/22  0544 12/15/22  0523 12/14/22  0457 12/13/22  0519   HEMOGLOBIN g/dL 7 8* 8 3* 7 7* 7 8* 7 8* 7 7* 8 3*   HEMATOCRIT % 26 9* 28 8* 26 5* 26 2* 25 7* 25 4* 27 1*   MCV fL  --  91  --  89 87 88 87         Moderate to severe aortic stenosis  Assessment & Plan  Severe AS on echo in 1/2022  · MARY showed mild to moderate concentric hypertrophy with EF of 55% with moderately thickened aortic valve with severe aortic valve stenosis    Gastro-esophageal reflux disease without esophagitis  Assessment & Plan  Continue Protonix    Obesity (BMI 30-39  9)  Assessment & Plan  Body mass index is 34 02 kg/m²  · Diet and lifestyle modification    CAD (coronary artery disease)  Assessment & Plan  Status post PCI with stents x 2   · Continue Imdur, statin  · Plavix was discontinued on prior admission      Stage 3a chronic kidney disease [facility])  Assessment & Plan  Lab Results   Component Value Date    EGFR 50 12/19/2022    EGFR 46 12/18/2022    EGFR 44 12/17/2022    CREATININE 1 30 12/19/2022    CREATININE 1 40 (H) 12/18/2022    CREATININE 1 44 (H) 12/17/2022     Baseline creatinine appears to be around 1 3-1 6s    · Creatinine continues to remain at baseline  · Repeat lab work in AM       JOVI (obstructive sleep apnea)  Assessment & Plan  Continue CPAP at HS  Hyperlipidemia  Assessment & Plan  Continue Lipitor    Chronic a-fib [facility])  Assessment & Plan  Status post pacemaker insertion  · Xarelto was held for possible biopsy but since restarted      Benign prostatic hyperplasia  Assessment & Plan  Continue Proscar    Benign hypertension with chronic kidney disease, stage III (HCC)  Assessment & Plan  Holding torsemide  · IV Bumex as noted above with transition to [facility] tomorrow        VTE Pharmacologic Prophylaxis:   Xarelto    Patient Centered Rounds: I performed bedside rounds with nursing staff today  Discussions with Specialists or Other Care Team Provider: yes - ID    Education and Discussions with Family / Patient: Updated  (daughter) via phone  Time Spent for Care: 40 min  More than 50% of total time spent on counseling and coordination of care as described above  Current Length of Stay: 13 day(s)  Current Patient Status: Inpatient   Certification Statement: The patient will continue to require additional inpatient hospital stay due to Volume overload, MSSA bacteremia requiring placement  Discharge Plan: Anticipate discharge in 24-48 hrs to rehab facility  Code Status: Level 1 - Full Code    Subjective:     Patient reports significant improvement in his breathing compared to before  Hoping to be getting discharged soon    Objective:     Vitals:   Temp (24hrs), Av 8 °F (36 °C), Min:96 3 °F (35 7 °C), Max:97 4 °F (36 3 °C)    Temp:  [96 3 °F (35 7 °C)-97 4 °F (36 3 °C)] 96 3 °F (35 7 °C)  HR:  [70-75] 75  Resp:  [18-20] 20  BP: (112-131)/(57-75) 118/57  SpO2:  [94 %-100 %] 96 %  Body mass index is 34 02 kg/m²  Input and Output Summary (last 24 hours): Intake/Output Summary (Last 24 hours) at 2022 191  Last data filed at 2022 1300  Gross per 24 hour   Intake --   Output 1000 ml   Net -1000 ml       Physical Exam:   Physical Exam  Vitals reviewed  Constitutional:       General: He is not in acute distress  Appearance: He is ill-appearing (Chronically)  HENT:      Head: Normocephalic and atraumatic  Eyes:      General:         Right eye: No discharge  Left eye: No discharge  Cardiovascular:      Rate and Rhythm: Normal rate and regular rhythm  Pulmonary:      Effort: Pulmonary effort is normal  No respiratory distress  Breath sounds: No wheezing or rales  Comments: Improving air entry bilaterally  Abdominal:      General: Bowel sounds are normal  There is no distension  Palpations: Abdomen is soft  Tenderness: There is no abdominal tenderness  Neurological:      Mental Status: He is alert and oriented to person, place, and time           Additional Data:     Labs:  Results from last 7 days   Lab Units 12/19/22  0517 12/18/22  0559   WBC Thousand/uL  --  10 35*   HEMOGLOBIN g/dL 7 8* 8 3*   HEMATOCRIT % 26 9* 28 8*   PLATELETS Thousands/uL  --  245     Results from last 7 days   Lab Units 12/19/22  0517   SODIUM mmol/L 140   POTASSIUM mmol/L 3 8   CHLORIDE mmol/L 102   CO2 mmol/L 34*   BUN mg/dL 38*   CREATININE mg/dL 1 30   ANION GAP mmol/L 4   CALCIUM mg/dL 8 3   ALBUMIN g/dL 1 8*   TOTAL BILIRUBIN mg/dL 0 44   ALK PHOS U/L 92   ALT U/L 9*   AST U/L 41   GLUCOSE RANDOM mg/dL 117         Results from last 7 days   Lab Units 12/15/22  1817   POC GLUCOSE mg/dl 205*               Lines/Drains:  Invasive Devices     Central Venous Catheter Line  Duration           CVC Central Lines 12/12/22 Single Right  7 days                Central Line:  Goal for removal: N/A - Discharging with PICC for IV ABX/medications             Imaging: Reviewed radiology reports from this admission including: chest xray    Recent Cultures (last 7 days):         Last 24 Hours Medication List:   Current Facility-Administered Medications   Medication Dose Route Frequency Provider Last Rate   • acetaminophen  975 mg Oral Q8H Ahsan Ramirez, DO • acetylcysteine  3 mL Nebulization TID Lynn Revankar, DO     • albuterol  2 5 mg Nebulization Q4H PRN Lynn Revankar, DO     • albuterol  2 5 mg Nebulization Q4H PRN Lynn Revankar, DO     • allopurinol  200 mg Oral Daily NAVA Coleman     • atorvastatin  40 mg Oral Daily With NAVA French     • cefazolin  2,000 mg Intravenous Q8H Chary Santos MD 2,000 mg (12/19/22 3774)   • docusate sodium  100 mg Oral BID Ron Ellis MD     • DULoxetine  60 mg Oral BID NAVA Coleman     • finasteride  5 mg Oral Daily NAVA Coleman     • fluticasone-vilanterol  1 puff Inhalation Daily NAVA Coleman     • gabapentin  100 mg Oral HS NAVA Coleman     • isosorbide mononitrate  30 mg Oral Daily NAVA Coleman     • levalbuterol  1 25 mg Nebulization TID Lynn Revankar, DO      And   • sodium chloride  3 mL Nebulization TID Lynn Revankar, DO     • lidocaine  1 patch Topical Daily Shelby Mueller PA-C     • methocarbamol  500 mg Oral Q6H PRN Lynn Revankar, DO     • methylPREDNISolone sodium succinate  40 mg Intravenous Daily Lynn Revankar, DO     • ondansetron  4 mg Intravenous Q6H PRN NAVA Coleman     • pantoprazole  40 mg Oral Early Morning NAVA Coleman     • polyethylene glycol  17 g Oral Daily PRN NAVA Coleman     • polyethylene glycol  17 g Oral Daily Ron Ellis MD     • rivaroxaban  15 mg Oral Daily With Breakfast Lynn Revankar, DO     • saccharomyces boulardii  250 mg Oral BID NAVA Coleman     • senna  1 tablet Oral HS Chary Santos MD     • umeclidinium  1 puff Inhalation Daily NAVA Coleman          Today, Patient Was Seen By: Kelly Burrell DO    **Please Note: This note may have been constructed using a voice recognition system  **

## 2022-12-20 NOTE — ASSESSMENT & PLAN NOTE
Holding torsemide  · IV Bumex as noted above with transition to [facility] tomorrow

## 2022-12-20 NOTE — ASSESSMENT & PLAN NOTE
Wt Readings from Last 3 Encounters:   12/07/22 120 kg (265 lb)   11/22/22 121 kg (265 lb 10 5 oz)   10/20/22 120 kg (265 lb)     On Demadex 40 mg p o  Daily at home  · 2D echo in January 2022 showed normal EF, 65%, moderate concentric hypertrophy, dilated atriums, severe AS, mild to moderate AR, moderate MS  · Chest X-ray showed cardiomegaly with moderate congestive changes  · BNP was elevated at 14,000  · Demadex was initially held in the setting of sepsis and then later in the setting of worsening creatinine    Received dose of Lasix on 12/8 and 12/9 and 12/15  · Restarted on 20 mg of Demadex daily on 12/13 --> IV Bumex twice daily due to volume overload  · IV Bumex discontinued and transition to [facility] tomorrow

## 2022-12-20 NOTE — [place] UNIVERSAL TRANSFER FORM
[place] UNIVERSAL TRANSFER FORM  (ALL ITEMS MUST BE COMPLETED)    1  TRANSFER FROM: [address] [place] [address] Street: [facility]    2  DATE OF TRANSFER: 12/20/2022                        TIME OF TRANSFER: 1330    3  PATIENT NAME: EMILY Fragoso      YOB: 1940                             GENDER: male    4  LANGUAGE:   English    5  PHYSICIAN NAME:  Arlette Pedro MD                   PHONE: [phone]    6  CODE STATUS: Level 1 - Full Code        Out of Hospital DNR Attached: no    7  :                                      :  Extended Emergency Contact Information  Primary Emergency Contact: Sherry Love  Address: [address] [place] [address] [address] Phone: [phone]  Relation: Spouse  Secondary Emergency Contact: daljit loveelle  Mobile Phone: [phone]  Relation: Daughter           Health Care Representative/Proxy: no           Legal Guardian:  no             NAME OF:           HEALTH CARE REPRESENTATIVE/PROXY:                                         OR           LEGAL GUARDIAN, IF NOT :                                               PHONE:  (Day)           (Night)                        (Cell)    8  REASON FOR TRANSFER: pt needs rehab after long hospital stay to build up strength          V/S: /72   Pulse 74   Temp (!) 97 2 °F (36 2 °C)   Resp 18   Ht 6' 2" (1 88 m)   Wt 120 kg (265 lb)   SpO2 95%   BMI 34 02 kg/m²           PAIN:   9  PRIMARY DIAGNOSIS: MSSA bacteremia      Secondary Diagnosis:         Pacemaker: yes     Internal Defib: no          Mental Health Diagnosis (if Applicable):    10  RESTRAINTS:    11  RESPIRATORY NEEDS: RA  12  ISOLATION/PRECAUTION: standard    13  ALLERGY: Patient has no known allergies  14  SENSORY:      15  SKIN CONDITION: stage 1 right buttock    16  DIET: cardiac    17   IV ACCESS: R chest PICC    18  PERSONAL ITEMS SENT WITH PATIENT: clothing on pt    19  ATTACHED DOCUMENTS: MUST ATTACH CURRENT MEDICATION INFORMATION facesheet, AVS  20  AT RISK ALERTS:        HARM TO:     21  WEIGHT BEARING STATUS:         Left Leg: full        Right Leg: full    22  MENTAL STATUS: AAOx4  23  FUNCTION:        Walk: with help        Transfer: With Help        Toilet: With Help        Feed: Self    24  IMMUNIZATIONS/SCREENING:     Immunization History   Administered Date(s) Administered   • COVID-19 MODERNA VACC 0 5 ML IM 02/26/2021, 02/26/2021, 03/26/2021, 03/26/2021, 12/08/2021, 12/08/2021   • INFLUENZA 09/17/2021   • Influenza Split High Dose Preservative Free IM 12/03/2013, 10/23/2015   • Influenza, high dose seasonal 0 7 mL 10/01/2018, 10/10/2019, 09/21/2020, 09/17/2021, 10/05/2022   • Influenza, seasonal, injectable 11/05/2001, 12/15/2009, 01/20/2011, 10/19/2011, 10/01/2012   • Pneumococcal Conjugate 13-Valent 07/09/2019   • Pneumococcal Polysaccharide PPV23 03/26/2013   • Tdap 04/01/2016, 04/03/2016   • Zoster 10/01/2012   • Zoster Vaccine Recombinant 02/03/2020, 07/08/2020       25  BOWEL: continent    26  BLADDER: continent    27  SENDING FACILITY CONTACT: Ilia Joiner                    Title: RN        Unit: 2S        Phone: [phone] [address] (if known):        Title:        Unit:         Phone:         FORM PREFILLED BY (if applicable)       Title:       Unit:        Phone:         FORM COMPLETED BY Mercedes López RN      Title: JOSE      Phone: [phone]

## 2022-12-20 NOTE — DISCHARGE SUMMARY
[address]  Discharge- Rosalind Speak 1940, 80 y o  male MRN: [id number]  Unit/Bed#: [address] [place] [address] Encounter: [id number]  Primary Care Provider: Suman Mosqueda DO   Date and time admitted to hospital: 12/5/2022  6:39 PM    * MSSA bacteremia  Assessment & Plan  Patient presented with somnolence for 24 hours and generalized weakness  Reported a little cough and runny nose, denies sore throat/SOB/fever/chills  · Patient hospitalized in Nov '22 for symptomatic anemia, hemoglobin 7 1 on admission with positive stool guaiac, received 1 unit of PRBC, underwent EGD and colonoscopy both showed no evidence of bleeding  Outpatient capsule endoscopy was recommended which is still pending   Patient remained on Xarelto  · Initially - WBC 15 38, no bands, patient afebrile  · Lactic acid normal   Procalcitonin elevated  · Chest x-ray showed no evidence of pneumonia  · UA no evidence of infection  COVID-19, flu RSV negative  · CT chest showed -  Numerous new pulmonary nodules, concerning for mets; Mediastinal and hilar lymphadenopathy, concerning for mets; Small right pleural effusion with adjacent basal opacity, most likely atelectasis  Nodules could possibly be septic emboli  · Patient was given Zosyn in the ED, de-escalated to Rocephin  · Currently on Ancef 2 g every 8 hours for MSSA bacteremia  Plan for 6 weeks course  · ID input appreciated  · Echo showed EF of 60% with severe concentric hypertrophy, EF of 60% without any visible vegetations with severe aortic stenosis  · Status post MARY which showed EF of 55% without any large vegetation but small vegetation could not be ruled out due to calcium and acoustic shadowing  · Unclear source-possible from skin translocation as patient noted to have multiple scabs  · Pacemaker removal was recommended by ID  per cardiology, pacemaker has been in place for 12 years and removal would be an extensive surgical procedure    Given multiple comorbidities cardiology recommended conservative approach and treatment with IV antibiotics  · Repeat blood cultures from 12/7/2022 negative  · Patient will need weekly CBC with differential and CMP while on antibiotics  Patient will also need blood cultures x2 2 weeks after completion of antibiotics    Acute encephalopathy  Assessment & Plan  Likely acute toxic metabolic encephalopathy due to underlying infection  Reported feeling sleepy and weak  · CT head - Hypodensity in the right parieto-occipital region which could be chronic infarct versus metastatic disease based on the CT of the chest  · Mental status has improved with treatment of sepsis  · ABG no hypercapnia  · Patient was initially worked up for possible stroke  · Could not get MRI of the brain due to incompatible pacemaker  · Was given full dose aspirin in the ED along with Lipitor 80 mg  · Hemoglobin A1c was 5 1  · Repeat CT head 12/12 - persistent small patchy hypodensities concerning for vasogenic edema related to metastatic foci  · CT head with contrast on 12/19/2022 showed subcortical hypodensities do not demonstrate enhancement to suggest abscess  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    Abnormal CAT scan  Assessment & Plan  CT chest - numerous new pulmonary nodules concerning for metastatic disease along with mediastinal and hilar lymphadenopathy and small right pleural effusion  · CT abdomen pelvis without contrast on 11/19/2022 was unremarkable  · CT head - hypodensity in the right parieto-occipital region - 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  Repeat CT head on 12/12 showed persistent hypodensities concerning for metastatic foci  · Discussed with neurology who was concerned for metastatic lesion on CT brain  Oncology was consulted      · CT with IV contrast showed that 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  · Patient did have a colonoscopy during prior hospitalization which showed no masses  · No history of malignancy as per family  · Pulmonary recommended IR guided lung biopsy  Lung nodules could also be related to septic emboli  Per ID, will need repeat CT scan in 3 months  If the nodules are improving patient might not need further work-up but if the nodules are persistent might need biopsy  Family in agreement with this plan  · Patient to follow-up with oncology after discharge      Elevated troponin  Assessment & Plan  Mild troponin elevation most likely non MI troponin elevation secondary to sepsis  · EKG showed paced rhythm per prior provider  · Also complained of some chest pain on 12/8/22 and repeat troponin remains around 700  · Repeat EKG showed some inferolateral T wave inversion per prior provider  · Suspected secondary to symptomatic anemia  Symptoms improved after 1 unit of PRBC transfusion      Chronic diastolic heart failure (HCC)  Assessment & Plan  Wt Readings from Last 3 Encounters:   12/07/22 120 kg (265 lb)   11/22/22 121 kg (265 lb 10 5 oz)   10/20/22 120 kg (265 lb)     On Demadex 40 mg p o  Daily at home  · 2D echo in January 2022 showed normal EF, 65%, moderate concentric hypertrophy, dilated atriums, severe AS, mild to moderate AR, moderate MS  · Chest X-ray showed cardiomegaly with moderate congestive changes  · BNP was elevated at 14,000  · Demadex was initially held in the setting of sepsis and then later in the setting of worsening creatinine  Received dose of Lasix on 12/8 and 12/9 and 12/15  · Restarted on 20 mg of Demadex daily on 12/13 --> IV Bumex twice daily  12/16-12/19 due to volume overload  · Patient transitioned to Demadex 40 mg p o  daily starting today    Depression with anxiety  Assessment & Plan  Continue Cymbalta    Anemia  Assessment & Plan  Baseline hemoglobin appears to be around 7-9s  · No evidence of active bleeding     · Status post 1 unit PRBC transfusion during this admission  · Stool occult blood was negative x1  · Hemoglobin overall stable in 7-8s range    Results from last 7 days   Lab Units 12/20/22  0509 12/19/22  0517 12/18/22  0559 12/17/22  0457 12/16/22  0544 12/15/22  0523 12/14/22  0457   HEMOGLOBIN g/dL 8 3* 7 8* 8 3* 7 7* 7 8* 7 8* 7 7*   HEMATOCRIT % 27 9* 26 9* 28 8* 26 5* 26 2* 25 7* 25 4*   MCV fL  --   --  91  --  89 87 88         Moderate to severe aortic stenosis  Assessment & Plan  Severe AS on echo in 1/2022  · MARY showed mild to moderate concentric hypertrophy with EF of 55% with moderately thickened aortic valve with severe aortic valve stenosis    Gastro-esophageal reflux disease without esophagitis  Assessment & Plan  Continue Protonix    COPD (chronic obstructive pulmonary disease) (HCC)  Assessment & Plan  On Trelegy, albuterol nebulizer p r n  At home  · Was initially on 2 L oxygen but currently remains on room air  · Substituted Trelegy with [place] and Incruse  · Patient was noted to be wheezing on exam on 12/15  Continue received Solu-Medrol taper during hospitalization  Patient to be discharged on prednisone taper  · continue scheduled nebulizer treatments      Obesity (BMI 30-39  9)  Assessment & Plan  Body mass index is 34 02 kg/m²  · Diet and lifestyle modification    CAD (coronary artery disease)  Assessment & Plan  Status post PCI with stents x 2   · Continue Imdur, statin  · Plavix was discontinued on prior admission      Stage 3a chronic kidney disease [facility])  Assessment & Plan  Lab Results   Component Value Date    EGFR 53 12/20/2022    EGFR 50 12/19/2022    EGFR 46 12/18/2022    CREATININE 1 25 12/20/2022    CREATININE 1 30 12/19/2022    CREATININE 1 40 (H) 12/18/2022     Baseline creatinine appears to be around 1 3-1 6s    · Creatinine continues to remain at baseline  · BMP 1 week    Hyponatremia  Assessment & Plan  Results from last 7 days   Lab Units 12/20/22  0509 12/19/22  0517 12/18/22  0559 12/17/22  0457 12/16/22  0544 12/15/22  0523 12/14/22  0457 12/13/22  1559   SODIUM mmol/L 142 140 136 134* 135 131* 129* 127*     In the setting of volume overload  · Demadex was held in the setting of worsening creatinine but then restarted on 12/13  · And also received IV Bumex for 4 days which was changed to p o  Demadex  · Sodium level slowly within normal limits  Nephrology input appreciated  · Continue on 1200 mL fluid restriction    JOVI (obstructive sleep apnea)  Assessment & Plan  Continue CPAP at HS  Hyperlipidemia  Assessment & Plan  Continue Lipitor    Chronic a-fib [facility])  Assessment & Plan  Status post pacemaker insertion  · Xarelto was held for possible biopsy but since restarted      Benign prostatic hyperplasia  Assessment & Plan  Continue Proscar    Benign hypertension with chronic kidney disease, stage III (HCC)  Assessment & Plan  Holding torsemide  · Received IV Bumex and will transition to home dose of Demadex 40 mg p o  daily        Medical Problems     Resolved Problems  Date Reviewed: 12/20/2022   None       Discharging Physician / Practitioner: Devendra Pennington MD  PCP: Kerri Richardson,   Admission Date:   Admission Orders (From admission, onward)     Ordered        12/06/22 1402  Inpatient Admission  Once            12/06/22 0145  Place in Observation  Once                      Discharge Date: 12/20/22    Consultations During Hospital Stay:  Infectious diseases, cardiology, nephrology    Procedures Performed:   · 2D echo showed EF of 60% without any diastolic dysfunction with aortic stenosis with aortic valve area of 0 9 cm², moderate diffuse thickening of the anterior leaflet and posterior leaflet  · MARY showed EF of 45% without any clots with severe aortic stenosis with moderate thickening of the anterior leaflet and posterior leaflet of mitral valve with calcification    Outpatient Tests Requested:  · CBC with differential and CMP weekly while on antibiotics    Patient will need outpatient follow-up with pulmonology, cardiology, neurology, ID    Complications: None    Reason for Admission: Generalized weakness and somnolence for 24 hours    Hospital Course:   Glory Crigler is a 80 y o  male patient with past medical history of anemia, CKD, hypertension, atrial fibrillation, CAD status post PCI, CHF, atrial fibrillation, pacemaker, BPH, GERD, aortic stenosis, sleep apnea who originally presented to the hospital on 12/5/2022 due to the generalized weakness  Patient was admitted to the hospital for acute encephalopathy and sepsis  Patient was initially worked up for possible stroke but could not get MRI due to pacemaker  Patient's blood cultures grew MSSA and patient's antibiotics were changed to IV Ancef  Patient's repeat blood cultures remain negative  Patient was also seen by ID and cardiology and neurology  Patient had CAT scan of the brain which showed possible metastasis versus old stroke and CAT scan of the chest showed multiple lung nodules  There is suspicion for possible septic emboli versus metastatic disease but patient did not have any primary source of malignancy  Patient did have 2D echo and MARY which showed no evidence of vegetations  Patient received 1 unit of PVC transition during hospitalization for anemia  Patient was also seen by oncology and pulmonology  Patient was continued on p o  diuretics and was on intermittent IV Lasix initially and needed extra IV Bumex for fluid overload during hospitalization  Patient continued to remain stable  Patient to be discharged to short-term rehabilitation for continued antibiotics and physical therapy  Please see above list of diagnoses and related plan for additional information  Condition at Discharge: stable    Discharge Day Visit / Exam:   Subjective: Patient is feeling well  Anxious to be discharged    Denies any shortness of breath, chest pain, abdominal pain  Vitals: Blood Pressure: 144/72 (12/20/22 0923)  Pulse: 74 (12/20/22 0923)  Temperature: (!) 97 2 °F (36 2 °C) (12/19/22 2259)  Temp Source: Oral (12/19/22 1917)  Respirations: 18 (12/20/22 0810)  Height: 6' 2" (188 cm) (12/07/22 1106)  Weight - Scale: 120 kg (265 lb) (12/07/22 1106)  SpO2: 95 % (12/20/22 0923)  Exam:   Physical Exam  Constitutional:       Appearance: Normal appearance  HENT:      Head: Normocephalic and atraumatic  Eyes:      Extraocular Movements: Extraocular movements intact  Pupils: Pupils are equal, round, and reactive to light  Cardiovascular:      Rate and Rhythm: Normal rate and regular rhythm  Heart sounds: Murmur heard  No gallop  Pulmonary:      Effort: Pulmonary effort is normal       Breath sounds: Normal breath sounds  Abdominal:      General: Bowel sounds are normal       Palpations: Abdomen is soft  Tenderness: There is no abdominal tenderness  Musculoskeletal:         General: No swelling or deformity  Normal range of motion  Cervical back: Normal range of motion and neck supple  Right lower leg: Edema present  Left lower leg: Edema present  Comments: Bilateral +1 pedal edema   Skin:     General: Skin is warm and dry  Neurological:      General: No focal deficit present  Mental Status: He is alert  Discussion with Family: Updated  (daughter) at bedside  Discharge instructions/Information to patient and family:   See after visit summary for information provided to patient and family  Provisions for Follow-Up Care:  See after visit summary for information related to follow-up care and any pertinent home health orders  Disposition:   Other [place] at [facility]    Planned Readmission: No     Discharge Statement:  I spent 35 minutes discharging the patient  This time was spent on the day of discharge  I had direct contact with the patient on the day of discharge   Greater than 50% of the total time was spent examining patient, answering all patient questions, arranging and discussing plan of care with patient as well as directly providing post-discharge instructions  Additional time then spent on discharge activities  Discharge Medications:  See after visit summary for reconciled discharge medications provided to patient and/or family        **Please Note: This note may have been constructed using a voice recognition system**

## 2022-12-20 NOTE — ASSESSMENT & PLAN NOTE
Patient presented with somnolence for 24 hours and generalized weakness  Reported a little cough and runny nose, denies sore throat/SOB/fever/chills  · Patient hospitalized in Nov '22 for symptomatic anemia, hemoglobin 7 1 on admission with positive stool guaiac, received 1 unit of PRBC, underwent EGD and colonoscopy both showed no evidence of bleeding  Outpatient capsule endoscopy was recommended which is still pending   Patient remained on Xarelto  · Initially - WBC 15 38, no bands, patient afebrile  · Lactic acid normal   Procalcitonin elevated  · Chest x-ray showed no evidence of pneumonia  · UA no evidence of infection  COVID-19, flu RSV negative  · CT chest showed -  Numerous new pulmonary nodules, concerning for mets; Mediastinal and hilar lymphadenopathy, concerning for mets; Small right pleural effusion with adjacent basal opacity, most likely atelectasis  Nodules could possibly be septic emboli  · Patient was given Zosyn in the ED, de-escalated to Rocephin  · Currently on Ancef 2 g every 8 hours for MSSA bacteremia  Plan for 6 weeks course  · ID input appreciated  · Echo showed EF of 60% with severe concentric hypertrophy, EF of 60% without any visible vegetations with severe aortic stenosis  · Status post MARY which showed EF of 55% without any large vegetation but small vegetation could not be ruled out due to calcium and acoustic shadowing  · Unclear source-possible from skin translocation as patient noted to have multiple scabs  · Pacemaker removal was recommended by ID  per cardiology, pacemaker has been in place for 12 years and removal would be an extensive surgical procedure    Given multiple comorbidities cardiology recommended conservative approach and treatment with IV antibiotics  · Repeat blood cultures from 12/7/2022 negative

## 2022-12-20 NOTE — ASSESSMENT & PLAN NOTE
Lab Results   Component Value Date    EGFR 50 12/19/2022    EGFR 46 12/18/2022    EGFR 44 12/17/2022    CREATININE 1 30 12/19/2022    CREATININE 1 40 (H) 12/18/2022    CREATININE 1 44 (H) 12/17/2022     Baseline creatinine appears to be around 1 3-1 6s    · Creatinine continues to remain at baseline  · Repeat lab work in AM

## 2022-12-20 NOTE — ASSESSMENT & PLAN NOTE
Likely acute toxic metabolic encephalopathy due to underlying infection  Reported feeling sleepy and weak  · CT head - Hypodensity in the right parieto-occipital region which could be chronic infarct versus metastatic disease based on the CT of the chest  · Mental status has improved with treatment of sepsis  · ABG no hypercapnia  · Patient was initially worked up for possible stroke  · Could not get MRI of the brain due to incompatible pacemaker  · Was given full dose aspirin in the ED along with Lipitor 80 mg  · Hemoglobin A1c was 5 1  · Repeat CT head 12/12 - persistent small patchy hypodensities concerning for vasogenic edema related to metastatic foci  · CT head with contrast on 12/19/2022 showed subcortical hypodensities do not demonstrate enhancement to suggest abscess    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

## 2022-12-20 NOTE — ASSESSMENT & PLAN NOTE
Lab Results   Component Value Date    EGFR 53 12/20/2022    EGFR 50 12/19/2022    EGFR 46 12/18/2022    CREATININE 1 25 12/20/2022    CREATININE 1 30 12/19/2022    CREATININE 1 40 (H) 12/18/2022     Baseline creatinine appears to be around 1 3-1 6s    · Creatinine continues to remain at baseline  · BMP 1 week

## 2022-12-20 NOTE — ASSESSMENT & PLAN NOTE
On Trelegy, albuterol nebulizer p r n  At home  · Was initially on 2 L oxygen but currently remains on room air  · Substituted Trelegy with [place] and Ismaelse  · Patient was noted to be wheezing on exam on 12/15  Continue received Solu-Medrol taper during hospitalization    Patient to be discharged on prednisone taper  · continue scheduled nebulizer treatments

## 2022-12-20 NOTE — ASSESSMENT & PLAN NOTE
Results from last 7 days   Lab Units 12/19/22  0517 12/18/22  0559 12/17/22  0457 12/16/22  0544 12/15/22  0523 12/14/22  0457 12/13/22  1559 12/13/22  0519   SODIUM mmol/L 140 136 134* 135 131* 129* 127* 128*     In the setting of volume overload  · Demadex was held in the setting of worsening creatinine but then restarted on 12/13  · IV Bumex twice daily, discontinued after today's evening dose  · Sodium level slowly within normal limits    Nephrology input appreciated  · Continue on 1200 mL fluid restriction

## 2022-12-20 NOTE — ASSESSMENT & PLAN NOTE
CT chest - numerous new pulmonary nodules concerning for metastatic disease along with mediastinal and hilar lymphadenopathy and small right pleural effusion  · CT abdomen pelvis without contrast on 11/19/2022 was unremarkable  · CT head - hypodensity in the right parieto-occipital region - 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  Repeat CT head on 12/12 showed persistent hypodensities concerning for metastatic foci  · Discussed with neurology who was concerned for metastatic lesion on CT brain  Oncology was consulted  · CT with IV contrast showed that 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  · Patient did have a colonoscopy during prior hospitalization which showed no masses  · No history of malignancy as per family  · Pulmonary recommended IR guided lung biopsy  Lung nodules could also be related to septic emboli  Per ID, will need repeat CT scan in 3 months  If the nodules are improving patient might not need further work-up but if the nodules are persistent might need biopsy    Family in agreement with this plan  · Patient to follow-up with oncology after discharge

## 2022-12-20 NOTE — CASE MANAGEMENT
Case Management Discharge Planning Note    Patient name Ford Wynn  Location [address] [address] [place] Coates MRN [id number]  : 1940 Date 2022       Current Admission Date: 2022  Current Admission Diagnosis:MSSA bacteremia   Patient Active Problem List    Diagnosis Date Noted   • Anemia 2022   • Depression with anxiety 2022   • MSSA bacteremia 2022   • Acute encephalopathy 2022   • Abnormal CAT scan 2022   • Elevated troponin 2022   • Left hip pain 2022   • Cervical strain 2022   • Lump of skin of left lower extremity 2022   • Primary osteoarthritis of both knees 2022   • Depression 2022   • Generalized weakness 2022   • Valvular heart disease 2022   • Pressure injury of right buttock, unstageable ([email or web address] ) 2022   • Ambulatory dysfunction 2022   • Dysphagia, pharyngoesophageal phase 2022   • Gastro-esophageal reflux disease without esophagitis 2022   • Generalized muscle weakness 2022   • History of falling 2022   • Iron deficiency anemia 2022   • Major depressive disorder, recurrent, unspecified ([email or web address] ) 2022   • Moderate to severe aortic stenosis 2022   • Other abnormalities of gait and mobility 2022   • Other specified polyneuropathies 2022   • Pulmonary hypertension due to left heart disease ([email or web address] ) 2022   • Presence of coronary angioplasty implant and graft 2022   • Unspecified hearing loss, unspecified ear 2022   • Chronic pain disorder 2022   • Fall 2022   • Closed fracture of right femur ([facility] ) 2022   • COPD (chronic obstructive pulmonary disease) ([email or web address] ) 2021   • Chronic diastolic heart failure ([facility] ) 2021   • Memory difficulty 2021   • Neuropathy 2021   • Coronary artery arteriosclerosis 2021   • Essential hypertension 2021   • Dyspnea on exertion    • Idiopathic hematuria 2021   • Kidney stone on left side 04/01/2021   • Flank pain 04/01/2021   • Obesity (BMI 30-39 9) 04/01/2021   • Chronic constipation 02/16/2021   • Vitamin D insufficiency 11/13/2020   • Symptomatic anemia 09/13/2020   • Former smoker 01/13/2020   • Nephrolithiasis 10/30/2019   • Bilateral leg edema 09/26/2019   • Hyperuricemia 07/22/2019   • Mixed simple and mucopurulent chronic bronchitis ([facility] ) 07/17/2019   • CAD (coronary artery disease) 05/13/2019   • Status post primary angioplasty with coronary stent 05/13/2019   • Stage 3a chronic kidney disease ([facility] ) 04/18/2019   • Hyponatremia 04/08/2019   • Serum total bilirubin elevated 04/08/2019   • Peripheral arteriosclerosis ([facility] ) 01/03/2019   • Pain in both lower extremities 01/31/2018   • Benign hypertension with chronic kidney disease, stage III ([facility]  ) 06/27/2017   • JOO (generalized anxiety disorder) 06/07/2016   • Chronic pain syndrome 03/17/2016   • Bilateral lumbar radiculopathy 03/17/2016   • Lumbar canal stenosis 03/17/2016   • Difficulty in walking 09/11/2015   • Persistent proteinuria 01/08/2015   • Urinary incontinence 10/30/2014   • Aneurysm of abdominal aorta 04/07/2014   • Centrilobular emphysema ([facility] ) 04/07/2014   • Deep venous insufficiency 04/07/2014   • Hyperlipidemia 04/07/2014   • Pacemaker 04/07/2014   • Fibromyalgia 08/08/2013   • 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/2013   • Fecal soiling 03/26/2013   • Class 2 obesity due to excess calories without serious comorbidity with body mass index (BMI) of 36 0 to 36 9 in adult 03/26/2013   • Chronic a-fib ([facility] ) 01/23/2013   • Allergic rhinitis 10/01/2012   • Benign prostatic hyperplasia 09/04/2012   • Carpal tunnel syndrome 09/04/2012   • Moderate or severe vision impairment, one eye 09/04/2012   • Smoker 09/04/2012   • JOVI (obstructive sleep apnea) 09/04/2012   • Venous insufficiency (chronic) (peripheral) 09/04/2012      LOS (days): 14  Geometric Mean LOS (GMLOS) (days): 3 90  Days to GMLOS:-10 OBJECTIVE:  Risk of Unplanned Readmission Score: 38 97      Current admission status: Inpatient   Preferred Pharmacy:   [facility] [address], [address] [facility] [address], [address] [address]  Phone: [phone] Fax: [phone]    [facility] [address] [facility], [address] [facility] [address] [facility] [address]  [place]  [address] [facility]  Phone: [phone] Fax: [facility], [address]  [address] [facility] [address] [facility]  Phone: [phone] Fax: [phone]    [facility] [address] [address]  Phone: [phone] Fax: [phone]    Primary Care Provider: Lizette Estrada DO    Primary Insurance: MEDICARE  Secondary Insurance: COMMERCIAL MISCELLANEOUS    DISCHARGE DETAILS:    Discharge planning discussed with[de-identified] Patient, Dtr Yoanna Cortes CM contacted family/caregiver?: Yes  Were Treatment Team discharge recommendations reviewed with patient/caregiver?: Yes  Did patient/caregiver verbalize understanding of patient care needs?: N/A- going to facility  Were patient/caregiver advised of the risks associated with not following Treatment Team discharge recommendations?: Yes    Contacts  Patient Contacts: Mckenna Dense (dtr)  Relationship to Patient[de-identified] Family  Contact Method: Phone  Phone Number: [phone]  Reason/Outcome: Continuity of Care, Emergency Contact, Discharge Planning    Other Referral/Resources/Interventions Provided:  Interventions: Transportation     Treatment Team Recommendation: Short Term Rehab  Discharge Destination Plan[de-identified] Short Term Rehab  Transport at Discharge : BLS Ambulance     Number/Name of Dispatcher: Maame Madrid and Unit #): TBD  ETA of Transport (Date): 12/20/22  ETA of Transport (Time):  (TBD)     IMM Given (Date):: 12/20/22  IMM Given to[de-identified] Patient  IMM reviewed with patient, patient agrees with discharge determination  Original provided copy placed in scan bin  Accepting Facility Name, [address] : [facility]  Receiving Facility/Agency Phone Number: [phone]  Facility/Agency Fax Number: [phone]       SW confirmed with [address] [place] [address] that bed is available for patient's admission today  She requested COVID test prior to discharge  BLS transport requested  Pickup time pending  PMN placed in label book  COVID test ordered and pending  Unit nurse, Attending, patient, and dtr Milton Menchaca all aware

## 2022-12-20 NOTE — ASSESSMENT & PLAN NOTE
Results from last 7 days   Lab Units 12/20/22  0509 12/19/22  0517 12/18/22  0559 12/17/22  0457 12/16/22  0544 12/15/22  0523 12/14/22  0457 12/13/22  1559   SODIUM mmol/L 142 140 136 134* 135 131* 129* 127*     In the setting of volume overload  · Demadex was held in the setting of worsening creatinine but then restarted on 12/13  · And also received IV Bumex for 4 days which was changed to p o  Demadex  · Sodium level slowly within normal limits    Nephrology input appreciated  · Continue on 1200 mL fluid restriction

## 2022-12-20 NOTE — ASSESSMENT & PLAN NOTE
Baseline hemoglobin appears to be around 7-9s  · No evidence of active bleeding     · Status post 1 unit PRBC transfusion during this admission  · Stool occult blood was negative x1  · Hemoglobin overall stable in 7-8s range    Results from last 7 days   Lab Units 12/19/22  0517 12/18/22  0559 12/17/22  0457 12/16/22  0544 12/15/22  0523 12/14/22  0457 12/13/22  0519   HEMOGLOBIN g/dL 7 8* 8 3* 7 7* 7 8* 7 8* 7 7* 8 3*   HEMATOCRIT % 26 9* 28 8* 26 5* 26 2* 25 7* 25 4* 27 1*   MCV fL  --  91  --  89 87 88 87

## 2022-12-20 NOTE — ASSESSMENT & PLAN NOTE
Likely acute toxic metabolic encephalopathy due to underlying infection  Reported feeling sleepy and weak  · CT head - Hypodensity in the right parieto-occipital region which could be chronic infarct versus metastatic disease based on the CT of the chest  · Mental status has improved with treatment of sepsis  · ABG no hypercapnia  · Patient was initially worked up for possible stroke  · Could not get MRI of the brain due to incompatible pacemaker  · Was given full dose aspirin in the ED along with Lipitor 80 mg  · Hemoglobin A1c was 5 1  · Repeat CT head 12/12 - persistent small patchy hypodensities concerning for vasogenic edema related to metastatic foci    · PT/OT evaluation and treatment

## 2022-12-20 NOTE — ASSESSMENT & PLAN NOTE
Baseline hemoglobin appears to be around 7-9s  · No evidence of active bleeding     · Status post 1 unit PRBC transfusion during this admission  · Stool occult blood was negative x1  · Hemoglobin overall stable in 7-8s range    Results from last 7 days   Lab Units 12/20/22  0509 12/19/22  0517 12/18/22  0559 12/17/22  0457 12/16/22  0544 12/15/22  0523 12/14/22  0457   HEMOGLOBIN g/dL 8 3* 7 8* 8 3* 7 7* 7 8* 7 8* 7 7*   HEMATOCRIT % 27 9* 26 9* 28 8* 26 5* 26 2* 25 7* 25 4*   MCV fL  --   --  91  --  89 87 88

## 2022-12-20 NOTE — ASSESSMENT & PLAN NOTE
Wt Readings from Last 3 Encounters:   12/07/22 120 kg (265 lb)   11/22/22 121 kg (265 lb 10 5 oz)   10/20/22 120 kg (265 lb)     On Demadex 40 mg p o  Daily at home  · 2D echo in January 2022 showed normal EF, 65%, moderate concentric hypertrophy, dilated atriums, severe AS, mild to moderate AR, moderate MS  · Chest X-ray showed cardiomegaly with moderate congestive changes  · BNP was elevated at 14,000  · Demadex was initially held in the setting of sepsis and then later in the setting of worsening creatinine    Received dose of Lasix on 12/8 and 12/9 and 12/15  · Restarted on 20 mg of Demadex daily on 12/13 --> IV Bumex twice daily  12/16-12/19 due to volume overload  · Patient transitioned to Demadex 40 mg p o  daily starting today

## 2022-12-21 DIAGNOSIS — R78.81 MSSA BACTEREMIA: Primary | ICD-10-CM

## 2022-12-21 DIAGNOSIS — B95.61 MSSA BACTEREMIA: Primary | ICD-10-CM

## 2022-12-21 NOTE — TELEPHONE ENCOUNTER
----- Message from Chary Santos MD sent at 12/20/2022  3:20 PM EST -----  Thank you for allowing us to participate in the care of your patient, Caryn Howard, who was hospitalized from 12/5/2022 through 12/20/2022 with the admitting diagnosis of encephalopathy, MSSA bacteremia and CHF  Patient was treated with IV antibiotics and will be discharged and IV Ancef for 6 weeks course  Patient also noted to have incidental lesion in the brain and bilateral lung nodules suspected septic emboli  Patient reads repeat CT of the chest in 3 months and if persistent nodules might need biopsy  Patient to follow-up with ID, pulmonology as outpatient      If you have any additional questions or would like to discuss further, please feel free to contact me      Chary Santos MD  [place] [address] Internal Medicine, Hospitalist  [phone]

## 2022-12-23 ENCOUNTER — TELEPHONE (OUTPATIENT)
Dept: INFECTIOUS DISEASES | Facility: CLINIC | Age: 82
End: 2022-12-23

## 2022-12-23 NOTE — TELEPHONE ENCOUNTER
Katty from [employer] calls office and states that pt is wanting to go home  She would like to know what would we like for them to do being that he has a central PICC line      Informed her I will reach out to the provider and give her a call back     Cb:[phone]

## 2022-12-23 NOTE — TELEPHONE ENCOUNTER
Contacted facility and spoke to [facility] informed her per Dr Humphrey she is not recommending pt go home but if the family agrees with him going home the facility /  would need to arrange @home infusion and nursing  Informed Katty per Dr Humphrey it is unsafe for pt to go home if these arrangements are not in place as pt needs to continue antibiotic      Katty verbalizes understanding at this time

## 2022-12-24 ENCOUNTER — APPOINTMENT (EMERGENCY)
Dept: RADIOLOGY | Facility: HOSPITAL | Age: 82
End: 2022-12-24

## 2022-12-24 ENCOUNTER — HOSPITAL ENCOUNTER (INPATIENT)
Facility: HOSPITAL | Age: 82
LOS: 4 days | Discharge: HOME HEALTH | End: 2022-12-29
Attending: EMERGENCY MEDICINE | Admitting: FAMILY MEDICINE

## 2022-12-24 DIAGNOSIS — R78.81 MSSA BACTEREMIA: ICD-10-CM

## 2022-12-24 DIAGNOSIS — Z87.891 FORMER SMOKER: ICD-10-CM

## 2022-12-24 DIAGNOSIS — J11.1 INFLUENZA: Primary | ICD-10-CM

## 2022-12-24 DIAGNOSIS — I50.9 CHF (CONGESTIVE HEART FAILURE) (HCC): ICD-10-CM

## 2022-12-24 DIAGNOSIS — R06.00 DYSPNEA: ICD-10-CM

## 2022-12-24 DIAGNOSIS — J43.2 CENTRILOBULAR EMPHYSEMA (HCC): Chronic | ICD-10-CM

## 2022-12-24 DIAGNOSIS — I50.32 CHRONIC DIASTOLIC HEART FAILURE (HCC): ICD-10-CM

## 2022-12-24 DIAGNOSIS — I50.33 ACUTE ON CHRONIC DIASTOLIC HEART FAILURE (HCC): ICD-10-CM

## 2022-12-24 DIAGNOSIS — N18.30 BENIGN HYPERTENSION WITH CHRONIC KIDNEY DISEASE, STAGE III (HCC): ICD-10-CM

## 2022-12-24 DIAGNOSIS — I12.9 BENIGN HYPERTENSION WITH CHRONIC KIDNEY DISEASE, STAGE III (HCC): ICD-10-CM

## 2022-12-24 DIAGNOSIS — B95.61 MSSA BACTEREMIA: ICD-10-CM

## 2022-12-24 DIAGNOSIS — N18.31 STAGE 3A CHRONIC KIDNEY DISEASE (HCC): ICD-10-CM

## 2022-12-24 LAB
2HR DELTA HS TROPONIN: -7 NG/L
4HR DELTA HS TROPONIN: -7 NG/L
ALBUMIN SERPL BCP-MCNC: 2.1 G/DL (ref 3.5–5)
ALP SERPL-CCNC: 96 U/L (ref 46–116)
ALT SERPL W P-5'-P-CCNC: 12 U/L (ref 12–78)
ANION GAP SERPL CALCULATED.3IONS-SCNC: 3 MMOL/L (ref 4–13)
APTT PPP: 40 SECONDS (ref 23–37)
AST SERPL W P-5'-P-CCNC: 31 U/L (ref 5–45)
BASOPHILS # BLD AUTO: 0.02 THOUSANDS/ÂΜL (ref 0–0.1)
BASOPHILS NFR BLD AUTO: 0 % (ref 0–1)
BILIRUB SERPL-MCNC: 0.72 MG/DL (ref 0.2–1)
BUN SERPL-MCNC: 19 MG/DL (ref 5–25)
CALCIUM ALBUM COR SERPL-MCNC: 9.9 MG/DL (ref 8.3–10.1)
CALCIUM SERPL-MCNC: 8.4 MG/DL (ref 8.3–10.1)
CARDIAC TROPONIN I PNL SERPL HS: 66 NG/L
CARDIAC TROPONIN I PNL SERPL HS: 66 NG/L
CARDIAC TROPONIN I PNL SERPL HS: 73 NG/L
CHLORIDE SERPL-SCNC: 101 MMOL/L (ref 96–108)
CO2 SERPL-SCNC: 34 MMOL/L (ref 21–32)
CREAT SERPL-MCNC: 1.13 MG/DL (ref 0.6–1.3)
EOSINOPHIL # BLD AUTO: 0.01 THOUSAND/ÂΜL (ref 0–0.61)
EOSINOPHIL NFR BLD AUTO: 0 % (ref 0–6)
ERYTHROCYTE [DISTWIDTH] IN BLOOD BY AUTOMATED COUNT: 20.3 % (ref 11.6–15.1)
FLUAV RNA RESP QL NAA+PROBE: POSITIVE
FLUBV RNA RESP QL NAA+PROBE: NEGATIVE
GFR SERPL CREATININE-BSD FRML MDRD: 60 ML/MIN/1.73SQ M
GLUCOSE SERPL-MCNC: 131 MG/DL (ref 65–140)
HCT VFR BLD AUTO: 30.5 % (ref 36.5–49.3)
HGB BLD-MCNC: 8.9 G/DL (ref 12–17)
IMM GRANULOCYTES # BLD AUTO: 0.1 THOUSAND/UL (ref 0–0.2)
IMM GRANULOCYTES NFR BLD AUTO: 1 % (ref 0–2)
INR PPP: 2.23 (ref 0.84–1.19)
LACTATE SERPL-SCNC: 1.5 MMOL/L (ref 0.5–2)
LYMPHOCYTES # BLD AUTO: 0.45 THOUSANDS/ÂΜL (ref 0.6–4.47)
LYMPHOCYTES NFR BLD AUTO: 4 % (ref 14–44)
MAGNESIUM SERPL-MCNC: 2.2 MG/DL (ref 1.6–2.6)
MCH RBC QN AUTO: 26.6 PG (ref 26.8–34.3)
MCHC RBC AUTO-ENTMCNC: 29.2 G/DL (ref 31.4–37.4)
MCV RBC AUTO: 91 FL (ref 82–98)
MONOCYTES # BLD AUTO: 0.63 THOUSAND/ÂΜL (ref 0.17–1.22)
MONOCYTES NFR BLD AUTO: 6 % (ref 4–12)
NEUTROPHILS # BLD AUTO: 9.79 THOUSANDS/ÂΜL (ref 1.85–7.62)
NEUTS SEG NFR BLD AUTO: 89 % (ref 43–75)
NRBC BLD AUTO-RTO: 0 /100 WBCS
NT-PROBNP SERPL-MCNC: 6479 PG/ML
PLATELET # BLD AUTO: 204 THOUSANDS/UL (ref 149–390)
PMV BLD AUTO: 8.8 FL (ref 8.9–12.7)
POTASSIUM SERPL-SCNC: 4.4 MMOL/L (ref 3.5–5.3)
PROT SERPL-MCNC: 7.2 G/DL (ref 6.4–8.4)
PROTHROMBIN TIME: 24.8 SECONDS (ref 11.6–14.5)
PTH RELATED PROT SERPL-SCNC: <2 PMOL/L
RBC # BLD AUTO: 3.34 MILLION/UL (ref 3.88–5.62)
RSV RNA RESP QL NAA+PROBE: NEGATIVE
SARS-COV-2 RNA RESP QL NAA+PROBE: NEGATIVE
SODIUM SERPL-SCNC: 138 MMOL/L (ref 135–147)
WBC # BLD AUTO: 11 THOUSAND/UL (ref 4.31–10.16)

## 2022-12-24 RX ORDER — SACCHAROMYCES BOULARDII 250 MG
250 CAPSULE ORAL 2 TIMES DAILY
Status: DISCONTINUED | OUTPATIENT
Start: 2022-12-24 | End: 2022-12-29 | Stop reason: HOSPADM

## 2022-12-24 RX ORDER — FLUTICASONE FUROATE AND VILANTEROL 100; 25 UG/1; UG/1
1 POWDER RESPIRATORY (INHALATION) DAILY
Status: DISCONTINUED | OUTPATIENT
Start: 2022-12-24 | End: 2022-12-29 | Stop reason: HOSPADM

## 2022-12-24 RX ORDER — LEVALBUTEROL 1.25 MG/.5ML
1.25 SOLUTION, CONCENTRATE RESPIRATORY (INHALATION)
Status: DISCONTINUED | OUTPATIENT
Start: 2022-12-24 | End: 2022-12-29 | Stop reason: HOSPADM

## 2022-12-24 RX ORDER — FUROSEMIDE 10 MG/ML
40 INJECTION INTRAMUSCULAR; INTRAVENOUS ONCE
Status: COMPLETED | OUTPATIENT
Start: 2022-12-24 | End: 2022-12-24

## 2022-12-24 RX ORDER — METHYLPREDNISOLONE SODIUM SUCCINATE 125 MG/2ML
125 INJECTION, POWDER, LYOPHILIZED, FOR SOLUTION INTRAMUSCULAR; INTRAVENOUS ONCE
Status: COMPLETED | OUTPATIENT
Start: 2022-12-24 | End: 2022-12-24

## 2022-12-24 RX ORDER — SENNOSIDES 8.6 MG
8.6 TABLET ORAL
Status: DISCONTINUED | OUTPATIENT
Start: 2022-12-24 | End: 2022-12-29 | Stop reason: HOSPADM

## 2022-12-24 RX ORDER — BUDESONIDE AND FORMOTEROL FUMARATE DIHYDRATE 80; 4.5 UG/1; UG/1
2 AEROSOL RESPIRATORY (INHALATION) 2 TIMES DAILY
Status: DISCONTINUED | OUTPATIENT
Start: 2022-12-24 | End: 2022-12-24

## 2022-12-24 RX ORDER — ATORVASTATIN CALCIUM 40 MG/1
40 TABLET, FILM COATED ORAL
Status: DISCONTINUED | OUTPATIENT
Start: 2022-12-24 | End: 2022-12-29 | Stop reason: HOSPADM

## 2022-12-24 RX ORDER — PREDNISONE 20 MG/1
20 TABLET ORAL DAILY
Status: COMPLETED | OUTPATIENT
Start: 2022-12-26 | End: 2022-12-28

## 2022-12-24 RX ORDER — NICOTINE 21 MG/24HR
1 PATCH, TRANSDERMAL 24 HOURS TRANSDERMAL DAILY
Status: DISCONTINUED | OUTPATIENT
Start: 2022-12-25 | End: 2022-12-29 | Stop reason: HOSPADM

## 2022-12-24 RX ORDER — CEFAZOLIN SODIUM 2 G/50ML
2000 SOLUTION INTRAVENOUS EVERY 8 HOURS
Status: DISCONTINUED | OUTPATIENT
Start: 2022-12-24 | End: 2022-12-29 | Stop reason: HOSPADM

## 2022-12-24 RX ORDER — SODIUM CHLORIDE FOR INHALATION 0.9 %
3 VIAL, NEBULIZER (ML) INHALATION
Status: DISCONTINUED | OUTPATIENT
Start: 2022-12-24 | End: 2022-12-29 | Stop reason: HOSPADM

## 2022-12-24 RX ORDER — DOCUSATE SODIUM 100 MG/1
100 CAPSULE, LIQUID FILLED ORAL 2 TIMES DAILY
Status: DISCONTINUED | OUTPATIENT
Start: 2022-12-24 | End: 2022-12-29 | Stop reason: HOSPADM

## 2022-12-24 RX ORDER — POLYETHYLENE GLYCOL 3350 17 G/17G
17 POWDER, FOR SOLUTION ORAL DAILY PRN
Status: DISCONTINUED | OUTPATIENT
Start: 2022-12-24 | End: 2022-12-29 | Stop reason: HOSPADM

## 2022-12-24 RX ORDER — LIDOCAINE 50 MG/G
1 PATCH TOPICAL DAILY
Status: DISCONTINUED | OUTPATIENT
Start: 2022-12-25 | End: 2022-12-29 | Stop reason: HOSPADM

## 2022-12-24 RX ORDER — ALLOPURINOL 100 MG/1
200 TABLET ORAL DAILY
Status: DISCONTINUED | OUTPATIENT
Start: 2022-12-25 | End: 2022-12-29 | Stop reason: HOSPADM

## 2022-12-24 RX ORDER — OSELTAMIVIR PHOSPHATE 75 MG/1
75 CAPSULE ORAL EVERY 12 HOURS SCHEDULED
Status: DISCONTINUED | OUTPATIENT
Start: 2022-12-24 | End: 2022-12-29 | Stop reason: HOSPADM

## 2022-12-24 RX ORDER — METHOCARBAMOL 500 MG/1
500 TABLET, FILM COATED ORAL EVERY 6 HOURS PRN
Status: DISCONTINUED | OUTPATIENT
Start: 2022-12-24 | End: 2022-12-29 | Stop reason: HOSPADM

## 2022-12-24 RX ORDER — ISOSORBIDE MONONITRATE 30 MG/1
30 TABLET, EXTENDED RELEASE ORAL DAILY
Status: DISCONTINUED | OUTPATIENT
Start: 2022-12-25 | End: 2022-12-24

## 2022-12-24 RX ORDER — GABAPENTIN 100 MG/1
100 CAPSULE ORAL
Status: DISCONTINUED | OUTPATIENT
Start: 2022-12-24 | End: 2022-12-29 | Stop reason: HOSPADM

## 2022-12-24 RX ORDER — FINASTERIDE 5 MG/1
5 TABLET, FILM COATED ORAL DAILY
Status: DISCONTINUED | OUTPATIENT
Start: 2022-12-25 | End: 2022-12-29 | Stop reason: HOSPADM

## 2022-12-24 RX ORDER — ACETYLCYSTEINE 200 MG/ML
3 SOLUTION ORAL; RESPIRATORY (INHALATION)
Status: DISCONTINUED | OUTPATIENT
Start: 2022-12-24 | End: 2022-12-29 | Stop reason: HOSPADM

## 2022-12-24 RX ORDER — DULOXETIN HYDROCHLORIDE 60 MG/1
60 CAPSULE, DELAYED RELEASE ORAL 2 TIMES DAILY
Status: DISCONTINUED | OUTPATIENT
Start: 2022-12-24 | End: 2022-12-29 | Stop reason: HOSPADM

## 2022-12-24 RX ORDER — IPRATROPIUM BROMIDE AND ALBUTEROL SULFATE 2.5; .5 MG/3ML; MG/3ML
3 SOLUTION RESPIRATORY (INHALATION) ONCE
Status: COMPLETED | OUTPATIENT
Start: 2022-12-24 | End: 2022-12-24

## 2022-12-24 RX ORDER — PREDNISONE 10 MG/1
10 TABLET ORAL DAILY
Status: DISCONTINUED | OUTPATIENT
Start: 2022-12-29 | End: 2022-12-29 | Stop reason: HOSPADM

## 2022-12-24 RX ORDER — PANTOPRAZOLE SODIUM 40 MG/1
40 TABLET, DELAYED RELEASE ORAL
Status: DISCONTINUED | OUTPATIENT
Start: 2022-12-25 | End: 2022-12-29 | Stop reason: HOSPADM

## 2022-12-24 RX ADMIN — OSELTAMIVIR PHOSPHATE 75 MG: 75 CAPSULE ORAL at 20:56

## 2022-12-24 RX ADMIN — ACETYLCYSTEINE 600 MG: 200 SOLUTION ORAL; RESPIRATORY (INHALATION) at 20:07

## 2022-12-24 RX ADMIN — METHYLPREDNISOLONE SODIUM SUCCINATE 125 MG: 125 INJECTION, POWDER, FOR SOLUTION INTRAMUSCULAR; INTRAVENOUS at 12:48

## 2022-12-24 RX ADMIN — LEVALBUTEROL HYDROCHLORIDE 1.25 MG: 1.25 SOLUTION, CONCENTRATE RESPIRATORY (INHALATION) at 20:06

## 2022-12-24 RX ADMIN — DOCUSATE SODIUM 100 MG: 100 CAPSULE, LIQUID FILLED ORAL at 20:56

## 2022-12-24 RX ADMIN — IPRATROPIUM BROMIDE AND ALBUTEROL SULFATE 3 ML: 2.5; .5 SOLUTION RESPIRATORY (INHALATION) at 12:56

## 2022-12-24 RX ADMIN — GABAPENTIN 100 MG: 100 CAPSULE ORAL at 21:18

## 2022-12-24 RX ADMIN — FUROSEMIDE 40 MG: 10 INJECTION, SOLUTION INTRAMUSCULAR; INTRAVENOUS at 14:14

## 2022-12-24 RX ADMIN — ATORVASTATIN CALCIUM 40 MG: 40 TABLET, FILM COATED ORAL at 20:56

## 2022-12-24 RX ADMIN — PREDNISONE 30 MG: 20 TABLET ORAL at 20:56

## 2022-12-24 RX ADMIN — FLUTICASONE FUROATE AND VILANTEROL TRIFENATATE 1 PUFF: 100; 25 POWDER RESPIRATORY (INHALATION) at 20:57

## 2022-12-24 RX ADMIN — ISODIUM CHLORIDE 3 ML: 0.03 SOLUTION RESPIRATORY (INHALATION) at 20:06

## 2022-12-24 RX ADMIN — SENNOSIDES 8.6 MG: 8.6 TABLET, FILM COATED ORAL at 21:18

## 2022-12-24 RX ADMIN — UMECLIDINIUM 1 PUFF: 62.5 AEROSOL, POWDER ORAL at 20:57

## 2022-12-24 RX ADMIN — DULOXETINE HYDROCHLORIDE 60 MG: 60 CAPSULE, DELAYED RELEASE ORAL at 20:56

## 2022-12-24 RX ADMIN — CEFAZOLIN SODIUM 2000 MG: 2 SOLUTION INTRAVENOUS at 20:56

## 2022-12-24 RX ADMIN — Medication 250 MG: at 20:56

## 2022-12-24 NOTE — LETTER
To: [facility]  From:  Greer Quintero [place] [phone]    [employer] for [employer]

## 2022-12-24 NOTE — H&P
[id number] [facility] 1940, 80 y o  male MRN: [id number]  Unit/Bed#: [address] [facility],[address] Floor Encounter: [id number]  Primary Care Provider: Faiza Dietrich DO   Date and time admitted to hospital: 12/24/2022 12:08 PM    * Influenza  Assessment & Plan  Presented to the ER due to shortness of breath from [facility]  · Influenza A positive  · Tamiflu  · On room air      Chronic diastolic heart failure (HCC)  Assessment & Plan  Wt Readings from Last 3 Encounters:   12/24/22 119 kg (262 lb)   12/07/22 120 kg (265 lb)   11/22/22 121 kg (265 lb 10 5 oz)     Chest x-ray with pulmonary vascular congestion/pulmonary edema with bibasilar atelectasis  · Patient received a dose of IV Lasix in the ER, monitor response  · Hold Demadex for today  · Received IV Lasix and IV Bumex on prior admission          MSSA bacteremia  Assessment & Plan  On recent admission noted to have MSSA bacteremia  · Plan for IV Ancef 2 g every 8 hours for total of 6 weeks, continue while here  · Patient underwent TTE and MARY on prior admission    COPD (chronic obstructive pulmonary disease) (HCC)  Assessment & Plan  Received 125 mg of IV Solu-Medrol in the ER  On exam no wheezing noted  · Continue prednisone taper as was recommended on prior admission    Anemia  Assessment & Plan  Hemoglobin overall stable  Repeat labs in a m      Results from last 7 days   Lab Units 12/24/22  1248 12/20/22  0509 12/19/22  0517 12/18/22  0559   HEMOGLOBIN g/dL 8 9* 8 3* 7 8* 8 3*   HEMATOCRIT % 30 5* 27 9* 26 9* 28 8*   MCV fL 91  --   --  91       Essential hypertension  Assessment & Plan  Continue imdur    Stage 3a chronic kidney disease [facility])  Assessment & Plan  Lab Results   Component Value Date    EGFR 60 12/24/2022    EGFR 53 12/20/2022    EGFR 50 12/19/2022    CREATININE 1 13 12/24/2022    CREATININE 1 25 12/20/2022    CREATININE 1 30 12/19/2022     Baseline creatinine 1 3-1 6  Creatinine is even better than baseline  Repeat labs in a m  Chronic a-fib (HCC)  Assessment & Plan  Status post pacemaker placement  Continue Xarelto    VTE Pharmacologic Prophylaxis:   xarelto  Code Status: Level 1 - Full Code     Anticipated Length of Stay: Patient will be admitted on an observation basis with an anticipated length of stay of less than 2 midnights secondary to influenza A infection  Total Time for Visit, including Counseling / Coordination of Care: 45 minutes Greater than 50% of this total time spent on direct patient counseling and coordination of care  Chief Complaint: shortness of breath    History of Present Illness:  Nahed Jj is a 80 y o  male who presents with shortness of breath that started today  Patient reports intermittent shortness of breath  Denies any chest pain received 3 nitroglycerin at the nursing home per ER note  Currently states his breathing is at his baseline    Review of Systems:  Review of Systems   Constitutional: Negative for appetite change, chills and fever  HENT: Positive for congestion  Negative for trouble swallowing  Eyes: Negative for photophobia and visual disturbance  Respiratory: Positive for shortness of breath  Negative for chest tightness  Cardiovascular: Positive for leg swelling  Negative for chest pain  Gastrointestinal: Negative for abdominal pain, diarrhea, nausea and vomiting  Genitourinary: Negative for dysuria, frequency and hematuria  Musculoskeletal: Positive for neck pain  Skin: Negative for pallor  Neurological: Negative for headaches  Hematological: Does not bruise/bleed easily  Psychiatric/Behavioral: Negative for agitation         Past Medical and Surgical History:   Past Medical History:   Diagnosis Date   • Arthritis    • Atrial flutter ([facility] )    • Chronic kidney disease     stage 3   • Coronary artery disease     2 stents   • Fluid retention    • Gout    • Heart disease    • Heart failure ([facility] )     pacemaker   • Hypertension    • Hyponatremia 04/08/2019   • Neurological disorder    • Pacemaker    • Pulmonary emphysema ([facility] )    • Radiculopathy     last assessed 1/28/16    • Shortness of breath     exertion   • Sleep apnea     c pap       Past Surgical History:   Procedure Laterality Date   • ANGIOPLASTY      x2 2 stents and then replaced   • CARDIAC PACEMAKER PLACEMENT      pacemaker permanent placement dual chamber / last assessed 4/7/14 / implantation    • CARDIAC SURGERY      pacemaker   • CHOLECYSTECTOMY     • CORONARY ANGIOPLASTY WITH STENT PLACEMENT     • EPIDURAL BLOCK INJECTION N/A 05/26/2016    Procedure: BLOCK / INJECTION EPIDURAL STEROID LUMBAR  L4-5;  Surgeon: Hilda Rowe MD;  Location: [facility] MAIN OR;  Service:    • EPIDURAL BLOCK INJECTION N/A 02/14/2019    Procedure: L4 L5 Lumbar Epidural Steroid Injection;  Surgeon: Hilda Rowe MD;  Location: [facility] MAIN OR;  Service: Pain Management    • EYE SURGERY      cataract left   • HAND SURGERY     • HIP PINNING Left    • INSERT / REPLACE / REMOVE PACEMAKER     • IR TUNNELED CENTRAL LINE PLACEMENT  12/12/2022   • KNEE ARTHROSCOPY W/ MENISCAL REPAIR Left    • KNEE SURGERY     • LUMBAR EPIDURAL INJECTION N/A 03/17/2016    Procedure: BLOCK / INJECTION LUMBAR  L4-5  (C-ARM); Surgeon: Hilda Rowe MD;  Location: [facility] MAIN OR;  Service:    • ME OPTX FEM SHFT FX W/INSJ IMED IMPLT W/WO SCREW Right 01/31/2022    Procedure: INSERTION NAIL IM FEMUR ANTEGRADE (TROCHANTERIC); Surgeon: Sylvester Jones MD;  Location:  Main OR;  Service: Orthopedics       Meds/Allergies:  Prior to Admission medications    Medication Sig Start Date End Date Taking?  Authorizing Provider   acetylcysteine (MUCOMYST) 200 mg/mL nebulizer solution Take 3 mL (600 mg total) by nebulization 3 (three) times a day 12/20/22   Stevan Munoz MD   Albuterol Sulfate (albuterol, FOR EMS ONLY,) (2 5 mg/3 mL) 0 083 % nebulizer solution Take 2 5 mg by nebulization every 6 (six) hours as needed for wheezing    Historical Provider, MD   allopurinol (ZYLOPRIM) 100 mg tablet Take 2 tablets (200 mg total) by mouth daily 1/15/22   Rebecca Boggs MD   atorvastatin (LIPITOR) 40 mg tablet Take 1 tablet (40 mg total) by mouth daily with dinner 12/20/22   Miladys Burciaga MD   ceFAZolin (ANCEF) 2000 mg IVPB Inject 2,000 mg into a catheter in a vein every 8 (eight) hours for 28 days 12/20/22 1/17/23  Miladys Burciaga MD   docusate sodium (COLACE) 100 mg capsule Take 1 capsule (100 mg total) by mouth 2 (two) times a day 12/20/22   Miladys Burciaga MD   DULoxetine (CYMBALTA) 60 mg delayed release capsule TAKE 1 CAPSULE TWICE DAILY 9/26/22   Silvia Mendoza DPM   finasteride (PROSCAR) 5 mg tablet TAKE 1 TABLET DAILY 9/24/22   Nikki Tse DO   fluticasone-umeclidinium-vilanterol (Trelegy Ellipta) 200-62 5-25 MCG/INH AEPB inhaler Inhale 1 puff daily Rinse mouth after use   12/17/21   Jennifer Brito PA-C   gabapentin (NEURONTIN) 100 mg capsule TAKE ONE CAPSULE BY MOUTH AT BEDTIME 12/9/22   Silvia Mendoza DPM   isosorbide mononitrate (IMDUR) 30 mg 24 hr tablet  9/23/22   Historical Provider, MD   levalbuterol (XOPENEX) 1 25 mg/0 5 mL nebulizer solution Take 0 5 mL (1 25 mg total) by nebulization 3 (three) times a day 12/20/22   Miladys Burciaga MD   lidocaine (LIDODERM) 5 % Apply 1 patch topically daily Remove & Discard patch within 12 hours or as directed by MD Do not start before December 21, 2022 12/21/22   Miladys Burciaga MD   methocarbamol (ROBAXIN) 500 mg tablet Take 1 tablet (500 mg total) by mouth every 6 (six) hours as needed for muscle spasms (neck pain) 12/20/22   Miladys Burciaga MD   pantoprazole (PROTONIX) 40 mg tablet Take 1 tablet (40 mg total) by mouth daily in the early morning Do not start before November 23, 2022 11/23/22   Miladys Burciaga MD   polyethylene glycol (MIRALAX) 17 g packet Take 17 g by mouth daily as needed    Historical Provider, MD   predniSONE 10 mg tablet Take 4 tablets (40 mg total) by mouth daily for 3 days, THEN 3 tablets (30 mg total) daily for 3 days, THEN 2 tablets (20 mg total) daily for 3 days, THEN 1 tablet (10 mg total) daily for 3 days  22  Nisha Gale MD   rivaroxaban (XARELTO) 15 mg tablet Take 1 tablet (15 mg total) by mouth daily with breakfast 19   Lynn Montano DO   saccharomyces boulardii (FLORASTOR) 250 mg capsule Take 1 capsule (250 mg total) by mouth 2 (two) times a day for 28 days 22  Nisha Gale MD   senna (SENOKOT) 8 6 mg Take 1 tablet (8 6 mg total) by mouth daily at bedtime 22   Nisha Gale MD   sodium chloride 0 9 % nebulizer solution Take 3 mL by nebulization 3 (three) times a day 22   Nisha Gale MD   torsemide 40 MG TABS Take 40 mg by mouth daily Do not start before 22   Nisha Gale MD     I have reviewed home medications using recent Epic encounter      Allergies: No Known Allergies    Social History:  Marital Status: /Civil Union   Occupation: none  Patient Pre-hospital Living Situation: With spouse  Patient Pre-hospital Level of Mobility: none  Patient Pre-hospital Diet Restrictions: none  Substance Use History:   Social History     Substance and Sexual Activity   Alcohol Use Never     Social History     Tobacco Use   Smoking Status Every Day   • Packs/day: 0 25   • Years: 50 00   • Pack years: 12 50   • Types: Cigarettes   • Start date: 2022   Smokeless Tobacco Former   Tobacco Comments    quit 2021     Social History     Substance and Sexual Activity   Drug Use No       Family History:  Family History   Problem Relation Age of Onset   • Cancer Mother 80        Cancer when 80 yrs old   • Heart disease Mother    • Hypertension Mother    • Heart disease Father    • Cancer Father         Heart   46   • Diabetes Neg Hx    • Stroke Neg Hx        Physical Exam:     Vitals:   Blood Pressure: 138/61 (22 1532)  Pulse: 73 (22 1532)  Temperature: (!) 96 5 °F (35 8 °C) (12/24/22 1212)  Temp Source: Tympanic (12/24/22 1212)  Respirations: (!) 24 (12/24/22 1405)  Weight - Scale: 119 kg (262 lb) (12/24/22 1212)  SpO2: 94 % (12/24/22 1532)    Physical Exam  Vitals reviewed  Constitutional:       General: He is not in acute distress  Appearance: He is ill-appearing (chronically)  HENT:      Head: Normocephalic and atraumatic  Eyes:      General:         Right eye: No discharge  Left eye: No discharge  Cardiovascular:      Rate and Rhythm: Normal rate and regular rhythm  Pulmonary:      Effort: Pulmonary effort is normal  No respiratory distress  Breath sounds: Normal breath sounds  No wheezing or rales  Abdominal:      General: Bowel sounds are normal  There is no distension  Palpations: Abdomen is soft  Tenderness: There is no abdominal tenderness  Musculoskeletal:      Right lower leg: Edema present  Left lower leg: Edema present  Neurological:      Mental Status: He is alert            Additional Data:     Lab Results:  Results from last 7 days   Lab Units 12/24/22  1248   WBC Thousand/uL 11 00*   HEMOGLOBIN g/dL 8 9*   HEMATOCRIT % 30 5*   PLATELETS Thousands/uL 204   NEUTROS PCT % 89*   LYMPHS PCT % 4*   MONOS PCT % 6   EOS PCT % 0     Results from last 7 days   Lab Units 12/24/22  1248   SODIUM mmol/L 138   POTASSIUM mmol/L 4 4   CHLORIDE mmol/L 101   CO2 mmol/L 34*   BUN mg/dL 19   CREATININE mg/dL 1 13   ANION GAP mmol/L 3*   CALCIUM mg/dL 8 4   ALBUMIN g/dL 2 1*   TOTAL BILIRUBIN mg/dL 0 72   ALK PHOS U/L 96   ALT U/L 12   AST U/L 31   GLUCOSE RANDOM mg/dL 131     Results from last 7 days   Lab Units 12/24/22  1248   INR  2 23*             Results from last 7 days   Lab Units 12/24/22  1248   LACTIC ACID mmol/L 1 5       Lines/Drains:  Invasive Devices     Central Venous Catheter Line  Duration           CVC Central Lines 12/12/22 Single Right  12 days          Peripheral Intravenous Line  Duration Peripheral IV 12/24/22 Right Antecubital <1 day                Central Line:  Goal for removal: N/A - Discharging with PICC for IV ABX/medications           Imaging: Reviewed radiology reports from this admission including: chest xray  XR chest 1 view portable   Final Result by Kaur Henderson MD (12/24 2434)      Cardiomegaly and pulmonary vascular congestion with interstitial pulmonary edema  Small right and probable smaller left pleural effusion with bibasilar atelectasis /consolidation  Workstation performed: [id number]             EKG and Other Studies Reviewed on Admission:   · EKG: no ekg noted    ** Please Note: This note has been constructed using a voice recognition system   **

## 2022-12-24 NOTE — ED PROVIDER NOTES
History  Chief Complaint   Patient presents with   • Shortness of Breath     Pt sent from [facility], pt sob, hAS copd  Pt was given 3 nitro at the nursing home got hypertension     80-year-old male with history of CHF COPD recent admission to the hospital presents with cough congestion shortness of breath from home  Not oxygen dependent  Denies any fevers chills nausea vomiting abdominal pain diarrhea no increased leg swelling no weight gain noted  History provided by:  Patient   used: No        Prior to Admission Medications   Prescriptions Last Dose Informant Patient Reported? Taking?    Albuterol Sulfate (albuterol, FOR EMS ONLY,) (2 5 mg/3 mL) 0 083 % nebulizer solution   Yes No   Sig: Take 2 5 mg by nebulization every 6 (six) hours as needed for wheezing   DULoxetine (CYMBALTA) 60 mg delayed release capsule   No No   Sig: TAKE 1 CAPSULE TWICE DAILY   acetylcysteine (MUCOMYST) 200 mg/mL nebulizer solution   No No   Sig: Take 3 mL (600 mg total) by nebulization 3 (three) times a day   allopurinol (ZYLOPRIM) 100 mg tablet   No No   Sig: Take 2 tablets (200 mg total) by mouth daily   atorvastatin (LIPITOR) 40 mg tablet   No No   Sig: Take 1 tablet (40 mg total) by mouth daily with dinner   ceFAZolin (ANCEF) 2000 mg IVPB   No No   Sig: Inject 2,000 mg into a catheter in a vein every 8 (eight) hours for 28 days   docusate sodium (COLACE) 100 mg capsule   No No   Sig: Take 1 capsule (100 mg total) by mouth 2 (two) times a day   finasteride (PROSCAR) 5 mg tablet   No No   Sig: TAKE 1 TABLET DAILY   fluticasone-umeclidinium-vilanterol (Trelegy Ellipta) 200-62 5-25 MCG/INH AEPB inhaler   No No   Sig: Inhale 1 puff daily Rinse mouth after use    gabapentin (NEURONTIN) 100 mg capsule   No No   Sig: TAKE ONE CAPSULE BY MOUTH AT BEDTIME   isosorbide mononitrate (IMDUR) 30 mg 24 hr tablet   Yes No   levalbuterol (XOPENEX) 1 25 mg/0 5 mL nebulizer solution   No No   Sig: Take 0 5 mL (1 25 mg total) by nebulization 3 (three) times a day   lidocaine (LIDODERM) 5 %   No No   Sig: Apply 1 patch topically daily Remove & Discard patch within 12 hours or as directed by MD Do not start before December 21, 2022  methocarbamol (ROBAXIN) 500 mg tablet   No No   Sig: Take 1 tablet (500 mg total) by mouth every 6 (six) hours as needed for muscle spasms (neck pain)   pantoprazole (PROTONIX) 40 mg tablet   No No   Sig: Take 1 tablet (40 mg total) by mouth daily in the early morning Do not start before November 23, 2022  polyethylene glycol (MIRALAX) 17 g packet   Yes No   Sig: Take 17 g by mouth daily as needed   predniSONE 10 mg tablet   No No   Sig: Take 4 tablets (40 mg total) by mouth daily for 3 days, THEN 3 tablets (30 mg total) daily for 3 days, THEN 2 tablets (20 mg total) daily for 3 days, THEN 1 tablet (10 mg total) daily for 3 days  rivaroxaban (XARELTO) 15 mg tablet   No No   Sig: Take 1 tablet (15 mg total) by mouth daily with breakfast   saccharomyces boulardii (FLORASTOR) 250 mg capsule   No No   Sig: Take 1 capsule (250 mg total) by mouth 2 (two) times a day for 28 days   senna (SENOKOT) 8 6 mg   No No   Sig: Take 1 tablet (8 6 mg total) by mouth daily at bedtime   sodium chloride 0 9 % nebulizer solution   No No   Sig: Take 3 mL by nebulization 3 (three) times a day   torsemide 40 MG TABS   No No   Sig: Take 40 mg by mouth daily Do not start before November 23, 2022        Facility-Administered Medications: None       Past Medical History:   Diagnosis Date   • Arthritis    • Atrial flutter (HCC)    • Chronic kidney disease     stage 3   • Coronary artery disease     2 stents   • Fluid retention    • Gout    • Heart disease    • Heart failure (HCC)     pacemaker   • Hypertension    • Hyponatremia 04/08/2019   • Neurological disorder    • Pacemaker    • Pulmonary emphysema ([facility] [address] )    • Radiculopathy     last assessed 1/28/16    • Shortness of breath     exertion   • Sleep apnea     c pap Past Surgical History:   Procedure Laterality Date   • ANGIOPLASTY      x2 2 stents and then replaced   • CARDIAC PACEMAKER PLACEMENT      pacemaker permanent placement dual chamber / last assessed 14 / implantation    • CARDIAC SURGERY      pacemaker   • CHOLECYSTECTOMY     • CORONARY ANGIOPLASTY WITH STENT PLACEMENT     • EPIDURAL BLOCK INJECTION N/A 2016    Procedure: BLOCK / INJECTION EPIDURAL STEROID LUMBAR  L4-5;  Surgeon: Pham Green MD;  Location: [place] [address] MAIN OR;  Service:    • EPIDURAL BLOCK INJECTION N/A 2019    Procedure: L4 L5 Lumbar Epidural Steroid Injection;  Surgeon: Pham Green MD;  Location: [place] [address] MAIN OR;  Service: Pain Management    • EYE SURGERY      cataract left   • HAND SURGERY     • HIP PINNING Left    • INSERT / REPLACE / REMOVE PACEMAKER     • IR TUNNELED CENTRAL LINE PLACEMENT  2022   • KNEE ARTHROSCOPY W/ MENISCAL REPAIR Left    • KNEE SURGERY     • LUMBAR EPIDURAL INJECTION N/A 2016    Procedure: BLOCK / INJECTION LUMBAR  L4-5  (C-ARM); Surgeon: Pham Green MD;  Location: [facility] MAIN OR;  Service:    • RI OPTX FEM SHFT FX W/INSJ IMED IMPLT W/WO SCREW Right 2022    Procedure: INSERTION NAIL IM FEMUR ANTEGRADE (TROCHANTERIC); Surgeon: Holly Nassar MD;  Location: [facility] Main OR;  Service: Orthopedics       Family History   Problem Relation Age of Onset   • Cancer Mother 80        Cancer when 80 yrs old   • Heart disease Mother    • Hypertension Mother    • Heart disease Father    • Cancer Father         Heart   46   • Diabetes Neg Hx    • Stroke Neg Hx      I have reviewed and agree with the history as documented      E-Cigarette/Vaping   • E-Cigarette Use Never User      E-Cigarette/Vaping Substances   • Nicotine No    • THC No    • CBD No    • Flavoring No    • Other No    • Unknown No      Social History     Tobacco Use   • Smoking status: Every Day     Packs/day: 0 25     Years: 50 00     Pack years: 12 50     Types: Cigarettes Start date: 1/1/2022   • Smokeless tobacco: Former   • Tobacco comments:     quit 02/14/2021   Vaping Use   • Vaping Use: Never used   Substance Use Topics   • Alcohol use: Never   • Drug use: No       Review of Systems   Constitutional: Negative  HENT: Positive for congestion  Eyes: Negative  Respiratory: Positive for cough and shortness of breath  Cardiovascular: Negative  Gastrointestinal: Negative  Endocrine: Negative  Genitourinary: Negative  Musculoskeletal: Negative  Skin: Negative  Allergic/Immunologic: Negative  Neurological: Negative  Hematological: Negative  Psychiatric/Behavioral: Negative  All other systems reviewed and are negative  Physical Exam  Physical Exam  Constitutional:       Appearance: Normal appearance  HENT:      Head: Normocephalic and atraumatic  Nose: Nose normal       Mouth/Throat:      Mouth: Mucous membranes are moist    Eyes:      Extraocular Movements: Extraocular movements intact  Pupils: Pupils are equal, round, and reactive to light  Cardiovascular:      Rate and Rhythm: Normal rate and regular rhythm  Pulmonary:      Effort: Pulmonary effort is normal  Tachypnea present  Breath sounds: Wheezing, rhonchi and rales present  Abdominal:      General: Abdomen is flat  Bowel sounds are normal       Palpations: Abdomen is soft  Musculoskeletal:         General: Normal range of motion  Cervical back: Normal range of motion and neck supple  Skin:     General: Skin is warm  Capillary Refill: Capillary refill takes less than 2 seconds  Neurological:      General: No focal deficit present  Mental Status: He is alert and oriented to person, place, and time  Mental status is at baseline  Psychiatric:         Mood and Affect: Mood normal          Thought Content:  Thought content normal          Vital Signs  ED Triage Vitals [12/24/22 1212]   Temperature Pulse Respirations Blood Pressure SpO2   (!) 96 5 °F (35 8 °C) 66 (!) 26 140/63 98 %      Temp Source Heart Rate Source Patient Position - Orthostatic VS BP Location FiO2 (%)   Tympanic Monitor Sitting Right arm --      Pain Score       --           Vitals:    12/24/22 1230 12/24/22 1300 12/24/22 1330 12/24/22 1405   BP: 148/65 150/73 163/70 147/64   Pulse: 69 69 72 69   Patient Position - Orthostatic VS:             Visual Acuity      ED Medications  Medications   ipratropium-albuterol (DUO-NEB) 0 5-2 5 mg/3 mL inhalation solution 3 mL (3 mL Nebulization Given 12/24/22 1256)   methylPREDNISolone sodium succinate (Solu-MEDROL) injection 125 mg (125 mg Intravenous Given 12/24/22 1248)   furosemide (LASIX) injection 40 mg (40 mg Intravenous Given 12/24/22 1414)       Diagnostic Studies  Results Reviewed     Procedure Component Value Units Date/Time    Blood culture #1 [[id number]] Collected: 12/24/22 1343    Lab Status: In process Specimen: Blood from Arm, Right Updated: 12/24/22 1401    Blood culture #2 [[id number]] Collected: 12/24/22 1358    Lab Status: In process Specimen: Blood from Arm, Left Updated: 12/24/22 1401    FLU/RSV/COVID - if FLU/RSV clinically relevant [[id number]]  (Abnormal) Collected: 12/24/22 1256    Lab Status: Final result Specimen: Nares from Nose Updated: 12/24/22 1354     SARS-CoV-2 Negative     INFLUENZA A PCR Positive     INFLUENZA B PCR Negative     RSV PCR Negative    Narrative:      FOR PEDIATRIC PATIENTS - copy/paste COVID Guidelines URL to browser: [email or web address]    SARS-CoV-2 assay is a Nucleic Acid Amplification assay intended for the  qualitative detection of nucleic acid from SARS-CoV-2 in nasopharyngeal  swabs  Results are for the presumptive identification of SARS-CoV-2 RNA  Positive results are indicative of infection with SARS-CoV-2, the virus  causing COVID-19, but do not rule out bacterial infection or co-infection  with other viruses   Laboratories within the [facility] and its  territories are required to report all positive results to the appropriate  public health authorities  Negative results do not preclude SARS-CoV-2  infection and should not be used as the sole basis for treatment or other  patient management decisions  Negative results must be combined with  clinical observations, patient history, and epidemiological information  This test has not been FDA cleared or approved  This test has been authorized by FDA under an Emergency Use Authorization  (EUA)  This test is only authorized for the duration of time the  declaration that circumstances exist justifying the authorization of the  emergency use of an in vitro diagnostic tests for detection of SARS-CoV-2  virus and/or diagnosis of COVID-19 infection under section 564(b)(1) of  the Act, 21 U  S C  492CSV-1(N)(7), unless the authorization is terminated  or revoked sooner  The test has been validated but independent review by FDA  and CLIA is pending  Test performed using [email or web address] GeneXpert: This RT-PCR assay targets N2,  a region unique to SARS-CoV-2  A conserved region in the E-gene was chosen  for pan-Sarbecovirus detection which includes SARS-CoV-2  According to CMS-2020-01-R, this platform meets the definition of high-throughput technology      Magnesium [[id number]]  (Normal) Collected: 12/24/22 1248    Lab Status: Final result Specimen: Blood from Central Venous Line Updated: 12/24/22 1334     Magnesium 2 2 mg/dL     NT-BNP PRO [[id number]]  (Abnormal) Collected: 12/24/22 1248    Lab Status: Final result Specimen: Blood from Central Venous Line Updated: 12/24/22 1334     NT-proBNP 6,479 pg/mL     HS Troponin I 2hr [[id number]]     Lab Status: No result Specimen: Blood     HS Troponin 0hr (reflex protocol) [[id number]]  (Abnormal) Collected: 12/24/22 1248    Lab Status: Final result Specimen: Blood from Central Venous Line Updated: 12/24/22 1329     hs TnI 0hr 73 ng/L     Lactic acid [[id number]] (Normal) Collected: 12/24/22 1248    Lab Status: Final result Specimen: Blood from Central Venous Line Updated: 12/24/22 1325     LACTIC ACID 1 5 mmol/L     Narrative:      Result may be elevated if tourniquet was used during collection      Comprehensive metabolic panel [[id number]]  (Abnormal) Collected: 12/24/22 1248    Lab Status: Final result Specimen: Blood from Central Venous Line Updated: 12/24/22 1323     Sodium 138 mmol/L      Potassium 4 4 mmol/L      Chloride 101 mmol/L      CO2 34 mmol/L      ANION GAP 3 mmol/L      BUN 19 mg/dL      Creatinine 1 13 mg/dL      Glucose 131 mg/dL      Calcium 8 4 mg/dL      Corrected Calcium 9 9 mg/dL      AST 31 U/L      ALT 12 U/L      Alkaline Phosphatase 96 U/L      Total Protein 7 2 g/dL      Albumin 2 1 g/dL      Total Bilirubin 0 72 mg/dL      eGFR 60 ml/min/1 73sq m     Narrative:      [place] guidelines for Chronic Kidney Disease (CKD):   •  Stage 1 with normal or high GFR (GFR > 90 mL/min/1 73 square meters)  •  Stage 2 Mild CKD (GFR = 60-89 mL/min/1 73 square meters)  •  Stage 3A Moderate CKD (GFR = 45-59 mL/min/1 73 square meters)  •  Stage 3B Moderate CKD (GFR = 30-44 mL/min/1 73 square meters)  •  Stage 4 Severe CKD (GFR = 15-29 mL/min/1 73 square meters)  •  Stage 5 End Stage CKD (GFR <15 mL/min/1 73 square meters)  Note: GFR calculation is accurate only with a steady state creatinine    Protime-INR [[id number]]  (Abnormal) Collected: 12/24/22 1248    Lab Status: Final result Specimen: Blood from Arm, Right Updated: 12/24/22 1320     Protime 24 8 seconds      INR 2 23    APTT [[id number]]  (Abnormal) Collected: 12/24/22 1248    Lab Status: Final result Specimen: Blood from Arm, Right Updated: 12/24/22 1320     PTT 40 seconds     CBC and differential [[id number]]  (Abnormal) Collected: 12/24/22 1248    Lab Status: Final result Specimen: Blood from Central Venous Line Updated: 12/24/22 1304     WBC 11 00 Thousand/uL      RBC 3 34 Million/uL      Hemoglobin 8 9 g/dL      Hematocrit 30 5 %      MCV 91 fL      MCH 26 6 pg      MCHC 29 2 g/dL      RDW 20 3 %      MPV 8 8 fL      Platelets 012 Thousands/uL      nRBC 0 /100 WBCs      Neutrophils Relative 89 %      Immat GRANS % 1 %      Lymphocytes Relative 4 %      Monocytes Relative 6 %      Eosinophils Relative 0 %      Basophils Relative 0 %      Neutrophils Absolute 9 79 Thousands/µL      Immature Grans Absolute 0 10 Thousand/uL      Lymphocytes Absolute 0 45 Thousands/µL      Monocytes Absolute 0 63 Thousand/µL      Eosinophils Absolute 0 01 Thousand/µL      Basophils Absolute 0 02 Thousands/µL                  XR chest 1 view portable   Final Result by Jaleesa Rivero MD (12/24 [id number])      Cardiomegaly and pulmonary vascular congestion with interstitial pulmonary edema  Small right and probable smaller left pleural effusion with bibasilar atelectasis /consolidation  Workstation performed: [id number]                    Procedures  ECG 12 Lead Documentation Only  Performed by: Carol Rodriguez DO  Authorized by: Carol Rodriguez DO     ECG reviewed by me, the ED Provider: yes    Patient location:  ED  Previous ECG:     Previous ECG:  Unavailable    Comparison to cardiac monitor: Yes    Interpretation:     Interpretation: non-specific    Rate:     ECG rate assessment: normal    Rhythm:     Rhythm: sinus rhythm    Ectopy:     Ectopy: none    QRS:     QRS axis:  Normal  Conduction:     Conduction: normal    ST segments:     ST segments:  Non-specific  T waves:     T waves: non-specific               ED Course                                             MDM  Number of Diagnoses or Management Options  Diagnosis management comments: Patient tested positive for the flu  His labs otherwise unremarkable elevated BNP and elevated troponin noted secondary to vascular congestion CHF  Elevated troponin likely NSTEMI 2 from the CHF    Patient received 1 dose of DuoNeb and dose of IV steroids and 40 mg of IV Lasix admitted to the hospital service for further evaluation management  Likely his shortness of breath secondary to influenza and vascular congestion/CHF  Amount and/or Complexity of Data Reviewed  Clinical lab tests: ordered and reviewed  Tests in the radiology section of CPT®: ordered and reviewed  Tests in the medicine section of CPT®: ordered and reviewed        Disposition  Final diagnoses:   Influenza   Dyspnea   CHF (congestive heart failure) ([facility] [address] )     Time reflects when diagnosis was documented in both MDM as applicable and the Disposition within this note     Time User Action Codes Description Comment    12/24/2022  2:28 PM Anepu, Gisella Add [J11 1] Influenza     12/24/2022  2:28 PM Anepu, Gisella Add [R06 00] Dyspnea     12/24/2022  2:28 PM Anepu, Gisella Add [I50 9] CHF (congestive heart failure) [facility])       ED Disposition     ED Disposition   Admit    Condition   Stable    Date/Time   Sat Dec 24, 2022  2:28 PM    Comment               Follow-up Information    None         Patient's Medications   Discharge Prescriptions    No medications on file       No discharge procedures on file      PDMP Review       Value Time User    PDMP Reviewed  Yes 2/19/2022 10:29 AM Cheli Richardson, [address]          ED Provider  Electronically Signed by           Wallene Fleischer, DO  12/24/22 4938

## 2022-12-25 LAB
ANION GAP SERPL CALCULATED.3IONS-SCNC: 6 MMOL/L (ref 4–13)
ATRIAL RATE: 241 BPM
BUN SERPL-MCNC: 27 MG/DL (ref 5–25)
CALCIUM SERPL-MCNC: 8.5 MG/DL (ref 8.3–10.1)
CHLORIDE SERPL-SCNC: 99 MMOL/L (ref 96–108)
CO2 SERPL-SCNC: 32 MMOL/L (ref 21–32)
CREAT SERPL-MCNC: 1.4 MG/DL (ref 0.6–1.3)
ERYTHROCYTE [DISTWIDTH] IN BLOOD BY AUTOMATED COUNT: 20.3 % (ref 11.6–15.1)
GFR SERPL CREATININE-BSD FRML MDRD: 46 ML/MIN/1.73SQ M
GLUCOSE SERPL-MCNC: 167 MG/DL (ref 65–140)
HCT VFR BLD AUTO: 28.7 % (ref 36.5–49.3)
HGB BLD-MCNC: 8.4 G/DL (ref 12–17)
MAGNESIUM SERPL-MCNC: 2.2 MG/DL (ref 1.6–2.6)
MCH RBC QN AUTO: 26.3 PG (ref 26.8–34.3)
MCHC RBC AUTO-ENTMCNC: 29.3 G/DL (ref 31.4–37.4)
MCV RBC AUTO: 90 FL (ref 82–98)
PLATELET # BLD AUTO: 208 THOUSANDS/UL (ref 149–390)
PMV BLD AUTO: 9.3 FL (ref 8.9–12.7)
POTASSIUM SERPL-SCNC: 4.3 MMOL/L (ref 3.5–5.3)
QRS AXIS: -64 DEGREES
QRSD INTERVAL: 144 MS
QT INTERVAL: 462 MS
QTC INTERVAL: 519 MS
RBC # BLD AUTO: 3.2 MILLION/UL (ref 3.88–5.62)
SODIUM SERPL-SCNC: 137 MMOL/L (ref 135–147)
T WAVE AXIS: 116 DEGREES
VENTRICULAR RATE: 76 BPM
WBC # BLD AUTO: 9.62 THOUSAND/UL (ref 4.31–10.16)

## 2022-12-25 RX ORDER — FUROSEMIDE 10 MG/ML
40 INJECTION INTRAMUSCULAR; INTRAVENOUS
Status: DISCONTINUED | OUTPATIENT
Start: 2022-12-26 | End: 2022-12-28

## 2022-12-25 RX ORDER — ALBUMIN (HUMAN) 12.5 G/50ML
25 SOLUTION INTRAVENOUS ONCE
Status: COMPLETED | OUTPATIENT
Start: 2022-12-25 | End: 2022-12-25

## 2022-12-25 RX ORDER — FUROSEMIDE 10 MG/ML
40 INJECTION INTRAMUSCULAR; INTRAVENOUS ONCE
Status: COMPLETED | OUTPATIENT
Start: 2022-12-25 | End: 2022-12-25

## 2022-12-25 RX ADMIN — ALBUMIN (HUMAN) 25 G: 0.25 INJECTION, SOLUTION INTRAVENOUS at 12:32

## 2022-12-25 RX ADMIN — OSELTAMIVIR PHOSPHATE 75 MG: 75 CAPSULE ORAL at 20:57

## 2022-12-25 RX ADMIN — PANTOPRAZOLE SODIUM 40 MG: 40 TABLET, DELAYED RELEASE ORAL at 05:30

## 2022-12-25 RX ADMIN — SENNOSIDES 8.6 MG: 8.6 TABLET, FILM COATED ORAL at 21:58

## 2022-12-25 RX ADMIN — ISODIUM CHLORIDE 3 ML: 0.03 SOLUTION RESPIRATORY (INHALATION) at 14:26

## 2022-12-25 RX ADMIN — LEVALBUTEROL HYDROCHLORIDE 1.25 MG: 1.25 SOLUTION, CONCENTRATE RESPIRATORY (INHALATION) at 14:26

## 2022-12-25 RX ADMIN — DULOXETINE HYDROCHLORIDE 60 MG: 60 CAPSULE, DELAYED RELEASE ORAL at 17:07

## 2022-12-25 RX ADMIN — ALLOPURINOL 200 MG: 100 TABLET ORAL at 08:37

## 2022-12-25 RX ADMIN — PREDNISONE 30 MG: 20 TABLET ORAL at 08:38

## 2022-12-25 RX ADMIN — CEFAZOLIN SODIUM 2000 MG: 2 SOLUTION INTRAVENOUS at 04:27

## 2022-12-25 RX ADMIN — FLUTICASONE FUROATE AND VILANTEROL TRIFENATATE 1 PUFF: 100; 25 POWDER RESPIRATORY (INHALATION) at 08:40

## 2022-12-25 RX ADMIN — ACETYLCYSTEINE 600 MG: 200 SOLUTION ORAL; RESPIRATORY (INHALATION) at 14:26

## 2022-12-25 RX ADMIN — DOCUSATE SODIUM 100 MG: 100 CAPSULE, LIQUID FILLED ORAL at 17:08

## 2022-12-25 RX ADMIN — LEVALBUTEROL HYDROCHLORIDE 1.25 MG: 1.25 SOLUTION, CONCENTRATE RESPIRATORY (INHALATION) at 08:02

## 2022-12-25 RX ADMIN — CEFAZOLIN SODIUM 2000 MG: 2 SOLUTION INTRAVENOUS at 10:03

## 2022-12-25 RX ADMIN — DOCUSATE SODIUM 100 MG: 100 CAPSULE, LIQUID FILLED ORAL at 08:38

## 2022-12-25 RX ADMIN — ISODIUM CHLORIDE 3 ML: 0.03 SOLUTION RESPIRATORY (INHALATION) at 21:49

## 2022-12-25 RX ADMIN — UMECLIDINIUM 1 PUFF: 62.5 AEROSOL, POWDER ORAL at 08:40

## 2022-12-25 RX ADMIN — FINASTERIDE 5 MG: 5 TABLET, FILM COATED ORAL at 08:38

## 2022-12-25 RX ADMIN — LEVALBUTEROL HYDROCHLORIDE 1.25 MG: 1.25 SOLUTION, CONCENTRATE RESPIRATORY (INHALATION) at 21:49

## 2022-12-25 RX ADMIN — CEFAZOLIN SODIUM 2000 MG: 2 SOLUTION INTRAVENOUS at 18:12

## 2022-12-25 RX ADMIN — DULOXETINE HYDROCHLORIDE 60 MG: 60 CAPSULE, DELAYED RELEASE ORAL at 08:38

## 2022-12-25 RX ADMIN — ISODIUM CHLORIDE 3 ML: 0.03 SOLUTION RESPIRATORY (INHALATION) at 08:02

## 2022-12-25 RX ADMIN — RIVAROXABAN 15 MG: 15 TABLET, FILM COATED ORAL at 08:37

## 2022-12-25 RX ADMIN — ACETYLCYSTEINE 600 MG: 200 SOLUTION ORAL; RESPIRATORY (INHALATION) at 08:02

## 2022-12-25 RX ADMIN — ATORVASTATIN CALCIUM 40 MG: 40 TABLET, FILM COATED ORAL at 17:07

## 2022-12-25 RX ADMIN — ACETYLCYSTEINE 600 MG: 200 SOLUTION ORAL; RESPIRATORY (INHALATION) at 21:49

## 2022-12-25 RX ADMIN — OSELTAMIVIR PHOSPHATE 75 MG: 75 CAPSULE ORAL at 08:39

## 2022-12-25 RX ADMIN — FUROSEMIDE 40 MG: 10 INJECTION, SOLUTION INTRAMUSCULAR; INTRAVENOUS at 13:41

## 2022-12-25 RX ADMIN — GABAPENTIN 100 MG: 100 CAPSULE ORAL at 22:00

## 2022-12-25 NOTE — ASSESSMENT & PLAN NOTE
Received 125 mg of IV Solu-Medrol in the ER    On exam no wheezing noted  · Continue prednisone taper as was recommended on prior admission

## 2022-12-25 NOTE — PLAN OF CARE
Problem: MOBILITY - ADULT  Goal: Maintain or return to baseline ADL function  Description: INTERVENTIONS:  -  Assess patient's ability to carry out ADLs; assess patient's baseline for ADL function and identify physical deficits which impact ability to perform ADLs (bathing, care of mouth/teeth, toileting, grooming, dressing, etc )  - Assess/evaluate cause of self-care deficits   - Assess range of motion  - Assess patient's mobility; develop plan if impaired  - Assess patient's need for assistive devices and provide as appropriate  - Encourage maximum independence but intervene and supervise when necessary  - Involve family in performance of ADLs  - Assess for home care needs following discharge   - Consider OT consult to assist with ADL evaluation and planning for discharge  - Provide patient education as appropriate  Outcome: Progressing  Goal: Maintains/Returns to pre admission functional level  Description: INTERVENTIONS:  - Perform BMAT or MOVE assessment daily    - Set and communicate daily mobility goal to care team and patient/family/caregiver  - Collaborate with rehabilitation services on mobility goals if consulted  - Perform Range of Motion  times a day  - Reposition patient every  hours    - Dangle patient  times a day  - Stand patient  times a day  - Ambulate patient  times a day  - Out of bed to chair  times a day   - Out of bed for meals  times a day  - Out of bed for toileting  - Record patient progress and toleration of activity level   Outcome: Progressing     Problem: Potential for Falls  Goal: Patient will remain free of falls  Description: INTERVENTIONS:  -  Assess patient's ability to carry out ADLs; assess patient's baseline for ADL function and identify physical deficits which impact ability to perform ADLs (bathing, care of mouth/teeth, toileting, grooming, dressing, etc )  - Assess/evaluate cause of self-care deficits   - Assess range of motion  - Assess patient's mobility; develop plan if impaired  - Assess patient's need for assistive devices and provide as appropriate  - Encourage maximum independence but intervene and supervise when necessary  - Involve family in performance of ADLs  - Assess for home care needs following discharge   - Consider OT consult to assist with ADL evaluation and planning for discharge  - Provide patient education as appropriate  Outcome: Progressing     Problem: PAIN - ADULT  Goal: Verbalizes/displays adequate comfort level or baseline comfort level  Description: Interventions:  - Encourage patient to monitor pain and request assistance  - Assess pain using appropriate pain scale  - Administer analgesics based on type and severity of pain and evaluate response  - Implement non-pharmacological measures as appropriate and evaluate response  - Consider cultural and social influences on pain and pain management  - Notify physician/advanced practitioner if interventions unsuccessful or patient reports new pain  Outcome: Progressing     Problem: INFECTION - ADULT  Goal: Absence or prevention of progression during hospitalization  Description: INTERVENTIONS:  - Assess and monitor for signs and symptoms of infection  - Monitor lab/diagnostic results  - Monitor all insertion sites, i e  indwelling lines, tubes, and drains  - Monitor endotracheal if appropriate and nasal secretions for changes in amount and color  - [place] appropriate cooling/warming therapies per order  - Administer medications as ordered  - Instruct and encourage patient and family to use good hand hygiene technique  - Identify and instruct in appropriate isolation precautions for identified infection/condition  Outcome: Progressing  Goal: Absence of fever/infection during neutropenic period  Description: INTERVENTIONS:  - Monitor WBC    Outcome: Progressing     Problem: SAFETY ADULT  Goal: Maintain or return to baseline ADL function  Description: INTERVENTIONS:  -  Assess patient's ability to carry out ADLs; assess patient's baseline for ADL function and identify physical deficits which impact ability to perform ADLs (bathing, care of mouth/teeth, toileting, grooming, dressing, etc )  - Assess/evaluate cause of self-care deficits   - Assess range of motion  - Assess patient's mobility; develop plan if impaired  - Assess patient's need for assistive devices and provide as appropriate  - Encourage maximum independence but intervene and supervise when necessary  - Involve family in performance of ADLs  - Assess for home care needs following discharge   - Consider OT consult to assist with ADL evaluation and planning for discharge  - Provide patient education as appropriate  Outcome: Progressing  Goal: Maintains/Returns to pre admission functional level  Description: INTERVENTIONS:  - Perform BMAT or MOVE assessment daily    - Set and communicate daily mobility goal to care team and patient/family/caregiver  - Collaborate with rehabilitation services on mobility goals if consulted  - Perform Range of Motion  times a day  - Reposition patient every  hours    - Dangle patient  times a day  - Stand patient  times a day  - Ambulate patient  times a day  - Out of bed to chair  times a day   - Out of bed for meals  times a day  - Out of bed for toileting  - Record patient progress and toleration of activity level   Outcome: Progressing  Goal: Patient will remain free of falls  Description: INTERVENTIONS:  -  Assess patient's ability to carry out ADLs; assess patient's baseline for ADL function and identify physical deficits which impact ability to perform ADLs (bathing, care of mouth/teeth, toileting, grooming, dressing, etc )  - Assess/evaluate cause of self-care deficits   - Assess range of motion  - Assess patient's mobility; develop plan if impaired  - Assess patient's need for assistive devices and provide as appropriate  - Encourage maximum independence but intervene and supervise when necessary  - Involve family in performance of ADLs  - Assess for home care needs following discharge   - Consider OT consult to assist with ADL evaluation and planning for discharge  - Provide patient education as appropriate  Outcome: Progressing     Problem: DISCHARGE PLANNING  Goal: Discharge to home or other facility with appropriate resources  Description: INTERVENTIONS:  - Identify barriers to discharge w/patient and caregiver  - Arrange for needed discharge resources and transportation as appropriate  - Identify discharge learning needs (meds, wound care, etc )  - Arrange for interpretive services to assist at discharge as needed  - Refer to Case Management Department for coordinating discharge planning if the patient needs post-hospital services based on physician/advanced practitioner order or complex needs related to functional status, cognitive ability, or social support system  Outcome: Progressing     Problem: Knowledge Deficit  Goal: Patient/family/caregiver demonstrates understanding of disease process, treatment plan, medications, and discharge instructions  Description: Complete learning assessment and assess knowledge base    Interventions:  - Provide teaching at level of understanding  - Provide teaching via preferred learning methods  Outcome: Progressing     Problem: RESPIRATORY - ADULT  Goal: Achieves optimal ventilation and oxygenation  Description: INTERVENTIONS:  - Assess for changes in respiratory status  - Assess for changes in mentation and behavior  - Position to facilitate oxygenation and minimize respiratory effort  - Oxygen administered by appropriate delivery if ordered  - Initiate smoking cessation education as indicated  - Encourage broncho-pulmonary hygiene including cough, deep breathe, Incentive Spirometry  - Assess the need for suctioning and aspirate as needed  - Assess and instruct to report SOB or any respiratory difficulty  - Respiratory Therapy support as indicated  Outcome: Progressing

## 2022-12-25 NOTE — PLAN OF CARE
Problem: MOBILITY - ADULT  Goal: Maintain or return to baseline ADL function  Description: INTERVENTIONS:  -  Assess patient's ability to carry out ADLs; assess patient's baseline for ADL function and identify physical deficits which impact ability to perform ADLs (bathing, care of mouth/teeth, toileting, grooming, dressing, etc )  - Assess/evaluate cause of self-care deficits   - Assess range of motion  - Assess patient's mobility; develop plan if impaired  - Assess patient's need for assistive devices and provide as appropriate  - Encourage maximum independence but intervene and supervise when necessary  - Involve family in performance of ADLs  - Assess for home care needs following discharge   - Consider OT consult to assist with ADL evaluation and planning for discharge  - Provide patient education as appropriate  Outcome: Progressing     Problem: Potential for Falls  Goal: Patient will remain free of falls  Description: INTERVENTIONS:  - Educate patient/family on patient safety including physical limitations  - Instruct patient to call for assistance with activity   - Consult OT/PT to assist with strengthening/mobility   - Keep Call bell within reach  - Keep bed low and locked with side rails adjusted as appropriate  - Keep care items and personal belongings within reach  - Initiate and maintain comfort rounds  - Make Fall Risk Sign visible to staff  - Offer Toileting every 2 Hours, in advance of need  - Initiate/Maintain bed alarm  - Obtain necessary fall risk management equipment:   - Apply yellow socks and bracelet for high fall risk patients  - Consider moving patient to room near nurses station  Outcome: Progressing     Problem: INFECTION - ADULT  Goal: Absence or prevention of progression during hospitalization  Description: INTERVENTIONS:  - Assess and monitor for signs and symptoms of infection  - Monitor lab/diagnostic results  - Monitor all insertion sites  - Administer medications as ordered  - Instruct and encourage patient and family to use good hand hygiene technique  - Identify and instruct in appropriate isolation precautions for identified infection/condition  Outcome: Progressing

## 2022-12-25 NOTE — ASSESSMENT & PLAN NOTE
Hemoglobin overall stable  Repeat labs in a m      Results from last 7 days   Lab Units 12/24/22  1248 12/20/22  0509 12/19/22  0517 12/18/22  0559   HEMOGLOBIN g/dL 8 9* 8 3* 7 8* 8 3*   HEMATOCRIT % 30 5* 27 9* 26 9* 28 8*   MCV fL 91  --   --  91

## 2022-12-25 NOTE — ASSESSMENT & PLAN NOTE
On recent admission noted to have MSSA bacteremia  · Plan for IV Ancef 2 g every 8 hours for total of 6 weeks, continue while here  · Patient underwent TTE and MARY on prior admission

## 2022-12-25 NOTE — PLAN OF CARE
Problem: MOBILITY - ADULT  Goal: Maintain or return to baseline ADL function  Description: INTERVENTIONS:  -  Assess patient's ability to carry out ADLs; assess patient's baseline for ADL function and identify physical deficits which impact ability to perform ADLs (bathing, care of mouth/teeth, toileting, grooming, dressing, etc )  - Assess/evaluate cause of self-care deficits   - Assess range of motion  - Assess patient's mobility; develop plan if impaired  - Assess patient's need for assistive devices and provide as appropriate  - Encourage maximum independence but intervene and supervise when necessary  - Involve family in performance of ADLs  - Assess for home care needs following discharge   - Consider OT consult to assist with ADL evaluation and planning for discharge  - Provide patient education as appropriate  Outcome: Progressing  Goal: Maintains/Returns to pre admission functional level  Description: INTERVENTIONS:  - Perform BMAT or MOVE assessment daily    - Set and communicate daily mobility goal to care team and patient/family/caregiver     - Collaborate with rehabilitation services on mobility goals if consulted  - Out of bed for toileting  - Record patient progress and toleration of activity level   Outcome: Progressing     Problem: Potential for Falls  Goal: Patient will remain free of falls  Description: INTERVENTIONS:  - Educate patient/family on patient safety including physical limitations  - Instruct patient to call for assistance with activity   - Consult OT/PT to assist with strengthening/mobility   - Keep Call bell within reach  - Keep bed low and locked with side rails adjusted as appropriate  - Keep care items and personal belongings within reach  - Initiate and maintain comfort rounds  - Make Fall Risk Sign visible to staff  - Apply yellow socks and bracelet for high fall risk patients  - Consider moving patient to room near nurses station  Outcome: Progressing     Problem: PAIN - ADULT  Goal: Verbalizes/displays adequate comfort level or baseline comfort level  Description: Interventions:  - Encourage patient to monitor pain and request assistance  - Assess pain using appropriate pain scale  - Administer analgesics based on type and severity of pain and evaluate response  - Implement non-pharmacological measures as appropriate and evaluate response  - Consider cultural and social influences on pain and pain management  - Notify physician/advanced practitioner if interventions unsuccessful or patient reports new pain  Outcome: Progressing     Problem: INFECTION - ADULT  Goal: Absence or prevention of progression during hospitalization  Description: INTERVENTIONS:  - Assess and monitor for signs and symptoms of infection  - Monitor lab/diagnostic results  - Monitor all insertion sites, i e  indwelling lines, tubes, and drains  - Monitor endotracheal if appropriate and nasal secretions for changes in amount and color  - [place] appropriate cooling/warming therapies per order  - Administer medications as ordered  - Instruct and encourage patient and family to use good hand hygiene technique  - Identify and instruct in appropriate isolation precautions for identified infection/condition  Outcome: Progressing  Goal: Absence of fever/infection during neutropenic period  Description: INTERVENTIONS:  - Monitor WBC    Outcome: Progressing     Problem: SAFETY ADULT  Goal: Maintain or return to baseline ADL function  Description: INTERVENTIONS:  -  Assess patient's ability to carry out ADLs; assess patient's baseline for ADL function and identify physical deficits which impact ability to perform ADLs (bathing, care of mouth/teeth, toileting, grooming, dressing, etc )  - Assess/evaluate cause of self-care deficits   - Assess range of motion  - Assess patient's mobility; develop plan if impaired  - Assess patient's need for assistive devices and provide as appropriate  - Encourage maximum independence but intervene and supervise when necessary  - Involve family in performance of ADLs  - Assess for home care needs following discharge   - Consider OT consult to assist with ADL evaluation and planning for discharge  - Provide patient education as appropriate  Outcome: Progressing  Goal: Maintains/Returns to pre admission functional level  Description: INTERVENTIONS:  - Perform BMAT or MOVE assessment daily    - Set and communicate daily mobility goal to care team and patient/family/caregiver     - Collaborate with rehabilitation services on mobility goals if consulted  - Out of bed for toileting  - Record patient progress and toleration of activity level   Outcome: Progressing  Goal: Patient will remain free of falls  Description: INTERVENTIONS:  - Educate patient/family on patient safety including physical limitations  - Instruct patient to call for assistance with activity   - Consult OT/PT to assist with strengthening/mobility   - Keep Call bell within reach  - Keep bed low and locked with side rails adjusted as appropriate  - Keep care items and personal belongings within reach  - Initiate and maintain comfort rounds  - Make Fall Risk Sign visible to staff  - Apply yellow socks and bracelet for high fall risk patients  - Consider moving patient to room near nurses station  Outcome: Progressing     Problem: DISCHARGE PLANNING  Goal: Discharge to home or other facility with appropriate resources  Description: INTERVENTIONS:  - Identify barriers to discharge w/patient and caregiver  - Arrange for needed discharge resources and transportation as appropriate  - Identify discharge learning needs (meds, wound care, etc )  - Arrange for interpretive services to assist at discharge as needed  - Refer to Case Management Department for coordinating discharge planning if the patient needs post-hospital services based on physician/advanced practitioner order or complex needs related to functional status, cognitive ability, or social support system  Outcome: Progressing     Problem: Knowledge Deficit  Goal: Patient/family/caregiver demonstrates understanding of disease process, treatment plan, medications, and discharge instructions  Description: Complete learning assessment and assess knowledge base    Interventions:  - Provide teaching at level of understanding  - Provide teaching via preferred learning methods  Outcome: Progressing

## 2022-12-25 NOTE — ASSESSMENT & PLAN NOTE
Presented to the ER due to shortness of breath from [facility]  · Influenza A positive  · Tamiflu  · On room air

## 2022-12-25 NOTE — ASSESSMENT & PLAN NOTE
Wt Readings from Last 3 Encounters:   12/24/22 119 kg (262 lb)   12/07/22 120 kg (265 lb)   11/22/22 121 kg (265 lb 10 5 oz)     Chest x-ray with pulmonary vascular congestion/pulmonary edema with bibasilar atelectasis  · Patient received a dose of IV Lasix in the ER, monitor response  · Hold Demadex for today  · Received IV Lasix and IV Bumex on prior admission

## 2022-12-25 NOTE — ASSESSMENT & PLAN NOTE
Lab Results   Component Value Date    EGFR 60 12/24/2022    EGFR 53 12/20/2022    EGFR 50 12/19/2022    CREATININE 1 13 12/24/2022    CREATININE 1 25 12/20/2022    CREATININE 1 30 12/19/2022     Baseline creatinine 1 3-1 6  Creatinine is even better than baseline  Repeat labs in a m

## 2022-12-26 LAB
ANION GAP SERPL CALCULATED.3IONS-SCNC: 5 MMOL/L (ref 4–13)
BUN SERPL-MCNC: 27 MG/DL (ref 5–25)
CALCIUM SERPL-MCNC: 8.5 MG/DL (ref 8.3–10.1)
CHLORIDE SERPL-SCNC: 100 MMOL/L (ref 96–108)
CO2 SERPL-SCNC: 33 MMOL/L (ref 21–32)
CREAT SERPL-MCNC: 1.45 MG/DL (ref 0.6–1.3)
ERYTHROCYTE [DISTWIDTH] IN BLOOD BY AUTOMATED COUNT: 20.4 % (ref 11.6–15.1)
GFR SERPL CREATININE-BSD FRML MDRD: 44 ML/MIN/1.73SQ M
GLUCOSE SERPL-MCNC: 136 MG/DL (ref 65–140)
HCT VFR BLD AUTO: 27.7 % (ref 36.5–49.3)
HGB BLD-MCNC: 8.1 G/DL (ref 12–17)
MCH RBC QN AUTO: 26.2 PG (ref 26.8–34.3)
MCHC RBC AUTO-ENTMCNC: 29.2 G/DL (ref 31.4–37.4)
MCV RBC AUTO: 90 FL (ref 82–98)
PLATELET # BLD AUTO: 205 THOUSANDS/UL (ref 149–390)
PMV BLD AUTO: 9.1 FL (ref 8.9–12.7)
POTASSIUM SERPL-SCNC: 3.9 MMOL/L (ref 3.5–5.3)
RBC # BLD AUTO: 3.09 MILLION/UL (ref 3.88–5.62)
SODIUM SERPL-SCNC: 138 MMOL/L (ref 135–147)
WBC # BLD AUTO: 10.95 THOUSAND/UL (ref 4.31–10.16)

## 2022-12-26 RX ORDER — ACETAMINOPHEN 325 MG/1
650 TABLET ORAL EVERY 6 HOURS SCHEDULED
Status: DISCONTINUED | OUTPATIENT
Start: 2022-12-26 | End: 2022-12-29 | Stop reason: HOSPADM

## 2022-12-26 RX ADMIN — DULOXETINE HYDROCHLORIDE 60 MG: 60 CAPSULE, DELAYED RELEASE ORAL at 09:36

## 2022-12-26 RX ADMIN — UMECLIDINIUM 1 PUFF: 62.5 AEROSOL, POWDER ORAL at 09:35

## 2022-12-26 RX ADMIN — CEFAZOLIN SODIUM 2000 MG: 2 SOLUTION INTRAVENOUS at 03:01

## 2022-12-26 RX ADMIN — PREDNISONE 20 MG: 20 TABLET ORAL at 09:37

## 2022-12-26 RX ADMIN — ISODIUM CHLORIDE 3 ML: 0.03 SOLUTION RESPIRATORY (INHALATION) at 19:29

## 2022-12-26 RX ADMIN — CEFAZOLIN SODIUM 2000 MG: 2 SOLUTION INTRAVENOUS at 11:54

## 2022-12-26 RX ADMIN — DULOXETINE HYDROCHLORIDE 60 MG: 60 CAPSULE, DELAYED RELEASE ORAL at 17:59

## 2022-12-26 RX ADMIN — PANTOPRAZOLE SODIUM 40 MG: 40 TABLET, DELAYED RELEASE ORAL at 05:47

## 2022-12-26 RX ADMIN — Medication 250 MG: at 09:37

## 2022-12-26 RX ADMIN — FLUTICASONE FUROATE AND VILANTEROL TRIFENATATE 1 PUFF: 100; 25 POWDER RESPIRATORY (INHALATION) at 09:35

## 2022-12-26 RX ADMIN — LEVALBUTEROL HYDROCHLORIDE 1.25 MG: 1.25 SOLUTION, CONCENTRATE RESPIRATORY (INHALATION) at 14:05

## 2022-12-26 RX ADMIN — RIVAROXABAN 15 MG: 15 TABLET, FILM COATED ORAL at 09:39

## 2022-12-26 RX ADMIN — OSELTAMIVIR PHOSPHATE 75 MG: 75 CAPSULE ORAL at 20:34

## 2022-12-26 RX ADMIN — ACETAMINOPHEN 650 MG: 325 TABLET, FILM COATED ORAL at 20:34

## 2022-12-26 RX ADMIN — ALLOPURINOL 200 MG: 100 TABLET ORAL at 09:37

## 2022-12-26 RX ADMIN — LEVALBUTEROL HYDROCHLORIDE 1.25 MG: 1.25 SOLUTION, CONCENTRATE RESPIRATORY (INHALATION) at 07:27

## 2022-12-26 RX ADMIN — ACETYLCYSTEINE 600 MG: 200 SOLUTION ORAL; RESPIRATORY (INHALATION) at 14:04

## 2022-12-26 RX ADMIN — GABAPENTIN 100 MG: 100 CAPSULE ORAL at 21:46

## 2022-12-26 RX ADMIN — LEVALBUTEROL HYDROCHLORIDE 1.25 MG: 1.25 SOLUTION, CONCENTRATE RESPIRATORY (INHALATION) at 19:28

## 2022-12-26 RX ADMIN — SENNOSIDES 8.6 MG: 8.6 TABLET, FILM COATED ORAL at 21:46

## 2022-12-26 RX ADMIN — DOCUSATE SODIUM 100 MG: 100 CAPSULE, LIQUID FILLED ORAL at 09:37

## 2022-12-26 RX ADMIN — ATORVASTATIN CALCIUM 40 MG: 40 TABLET, FILM COATED ORAL at 17:59

## 2022-12-26 RX ADMIN — ISODIUM CHLORIDE 3 ML: 0.03 SOLUTION RESPIRATORY (INHALATION) at 07:27

## 2022-12-26 RX ADMIN — OSELTAMIVIR PHOSPHATE 75 MG: 75 CAPSULE ORAL at 09:36

## 2022-12-26 RX ADMIN — FUROSEMIDE 40 MG: 10 INJECTION, SOLUTION INTRAMUSCULAR; INTRAVENOUS at 09:37

## 2022-12-26 RX ADMIN — ACETYLCYSTEINE 600 MG: 200 SOLUTION ORAL; RESPIRATORY (INHALATION) at 19:28

## 2022-12-26 RX ADMIN — LIDOCAINE 5% 1 PATCH: 700 PATCH TOPICAL at 09:36

## 2022-12-26 RX ADMIN — ACETYLCYSTEINE 600 MG: 200 SOLUTION ORAL; RESPIRATORY (INHALATION) at 07:27

## 2022-12-26 RX ADMIN — FUROSEMIDE 40 MG: 10 INJECTION, SOLUTION INTRAMUSCULAR; INTRAVENOUS at 18:04

## 2022-12-26 RX ADMIN — Medication 250 MG: at 17:59

## 2022-12-26 RX ADMIN — DOCUSATE SODIUM 100 MG: 100 CAPSULE, LIQUID FILLED ORAL at 17:59

## 2022-12-26 RX ADMIN — ISODIUM CHLORIDE 3 ML: 0.03 SOLUTION RESPIRATORY (INHALATION) at 14:05

## 2022-12-26 RX ADMIN — ACETAMINOPHEN 650 MG: 325 TABLET, FILM COATED ORAL at 23:25

## 2022-12-26 RX ADMIN — FINASTERIDE 5 MG: 5 TABLET, FILM COATED ORAL at 09:37

## 2022-12-26 RX ADMIN — CEFAZOLIN SODIUM 2000 MG: 2 SOLUTION INTRAVENOUS at 20:32

## 2022-12-26 NOTE — CONSULTS
Consultation - Cardiology   Hosea Ware 80 y o  male MRN: [id number]  Unit/Bed#: [address] [place] [address] Encounter: [id number]    Assessment/Plan     Assessment:  1  Influenza A  2  Chronic diastolic heart failure  3  Severe to critical aortic stenosis  4  MSSA bacteremia  5  COPD  6  Hypertension  7  Chronic kidney disease stage III  8  Chronic anemia  9  Chronic atrial fibrillation      Plan:  Patient has been admitted to the hospitalist service  1  Continue IV diuresis with Lasix 40 mg IV twice daily    2  Previous baseline creatinine appears to be between 1 3-1 6  May need to accept higher creatinine to keep patient euvolemic    3  Once again encourage patient to follow-up months blood cultures are clear for evaluation for aortic valve replacement  4   Continue Xarelto 50 mg daily for history of atrial fibrillation    5  Tamiflu for influenza A per primary team    6  Continue IV Ancef 2 g every 6 hours for history of bacteremia per infectious disease        History of Present Illness   Physician Requesting Consult: Curtis Parada DO  Reason for Consult / Principal Problem: Volume overload in the setting of severe aortic stenosis        HPI: Hosea Ware is a 80y o  year old male who presented to the emergency room on 12/24/2022 from [address] [place] [address] with complaints of shortness of breath  Testing demonstrated positive for influenza A and chest x-ray showed cardiomegaly and vascular congestion with interstitial pulmonary edema  Patient was admitted for further evaluation  Patient has a history for persistent atrial fibrillation, sick sinus syndrome for which she has a [employer] pacemaker implanted approximately 12 years ago and he recently underwent change of his generator during this past summer  Other history is for moderate to severe aortic stenosis, mitral regurgitation, chronic kidney disease and COPD    Patient was recently admitted to beginning of December and found to have staph aureus bacteremia for which she is undergoing long-term antibiotic therapy    Echocardiogram performed on December 6, 2022 demonstrates an EF of 60% with severe concentric hypertrophy, severe left atrial dilatation and mild right atrial dilatation and severe aortic stenosis with valve area less than 0 9 cm2  There is at least mild mitral valve stenosis  Inpatient consult to Cardiology  Consult performed by: NAVA Go  Consult ordered by: Wilman Oseguera DO          Review of Systems   Constitutional: Positive for activity change and fatigue  Negative for appetite change and fever  HENT: Negative  Negative for congestion, facial swelling, rhinorrhea and tinnitus  Eyes: Negative  Negative for photophobia and visual disturbance  Respiratory: Positive for cough and shortness of breath  Negative for chest tightness  Cardiovascular: Positive for leg swelling  Negative for chest pain and palpitations  Gastrointestinal: Negative for abdominal distention, diarrhea, nausea and vomiting  Endocrine: Negative  Negative for polydipsia, polyphagia and polyuria  Genitourinary: Negative  Negative for difficulty urinating  Musculoskeletal: Positive for gait problem  Skin: Negative  Neurological: Negative for dizziness, syncope, weakness and light-headedness  Hematological: Negative  Psychiatric/Behavioral: Negative          Historical Information   Past Medical History:   Diagnosis Date   • Arthritis    • Atrial flutter ([facility] )    • Chronic kidney disease     stage 3   • Coronary artery disease     2 stents   • Fluid retention    • Gout    • Heart disease    • Heart failure (HCC)     pacemaker   • Hypertension    • Hyponatremia 04/08/2019   • Neurological disorder    • Pacemaker    • Pulmonary emphysema ([facility] [address] )    • Radiculopathy     last assessed 1/28/16    • Shortness of breath     exertion   • Sleep apnea     c pap     Past Surgical History:   Procedure Laterality Date   • ANGIOPLASTY      x2 2 stents and then replaced   • CARDIAC PACEMAKER PLACEMENT      pacemaker permanent placement dual chamber / last assessed 4/7/14 / implantation    • CARDIAC SURGERY      pacemaker   • CHOLECYSTECTOMY     • CORONARY ANGIOPLASTY WITH STENT PLACEMENT     • EPIDURAL BLOCK INJECTION N/A 05/26/2016    Procedure: BLOCK / INJECTION EPIDURAL STEROID LUMBAR  L4-5;  Surgeon: Raul Castillo MD;  Location: [facility] MAIN OR;  Service:    • EPIDURAL BLOCK INJECTION N/A 02/14/2019    Procedure: L4 L5 Lumbar Epidural Steroid Injection;  Surgeon: Raul Castillo MD;  Location: [facility] MAIN OR;  Service: Pain Management    • EYE SURGERY      cataract left   • HAND SURGERY     • HIP PINNING Left    • INSERT / REPLACE / REMOVE PACEMAKER     • IR TUNNELED CENTRAL LINE PLACEMENT  12/12/2022   • KNEE ARTHROSCOPY W/ MENISCAL REPAIR Left    • KNEE SURGERY     • LUMBAR EPIDURAL INJECTION N/A 03/17/2016    Procedure: BLOCK / INJECTION LUMBAR  L4-5  (C-ARM); Surgeon: Raul Castillo MD;  Location: [facility] MAIN OR;  Service:    • MO OPTX FEM SHFT FX W/INSJ IMED IMPLT W/WO SCREW Right 01/31/2022    Procedure: INSERTION NAIL IM FEMUR ANTEGRADE (TROCHANTERIC);   Surgeon: Abe Douglas MD;  Location: [facility] Main OR;  Service: Orthopedics     Social History     Substance and Sexual Activity   Alcohol Use Never     Social History     Substance and Sexual Activity   Drug Use No     E-Cigarette/Vaping   • E-Cigarette Use Never User      E-Cigarette/Vaping Substances   • Nicotine No    • THC No    • CBD No    • Flavoring No    • Other No    • Unknown No      Social History     Tobacco Use   Smoking Status Every Day   • Packs/day: 0 25   • Years: 50 00   • Pack years: 12 50   • Types: Cigarettes   • Start date: 1/1/2022   Smokeless Tobacco Former   Tobacco Comments    quit 02/14/2021     Family History:   Family History   Problem Relation Age of Onset   • Cancer Mother 80        Cancer when 80 yrs old   • Heart disease Mother    • Hypertension Mother    • Heart disease Father    • Cancer Father         Heart   46   • Diabetes Neg Hx    • Stroke Neg Hx        Meds/Allergies   all current active meds have been reviewed, current meds:   Current Facility-Administered Medications   Medication Dose Route Frequency   • acetylcysteine (MUCOMYST) 200 mg/mL inhalation solution 600 mg  3 mL Nebulization TID   • allopurinol (ZYLOPRIM) tablet 200 mg  200 mg Oral Daily   • atorvastatin (LIPITOR) tablet 40 mg  40 mg Oral Daily With Dinner   • ceFAZolin (ANCEF) IVPB (premix in dextrose) 2,000 mg 50 mL  2,000 mg Intravenous Q8H   • docusate sodium (COLACE) capsule 100 mg  100 mg Oral BID   • DULoxetine (CYMBALTA) delayed release capsule 60 mg  60 mg Oral BID   • finasteride (PROSCAR) tablet 5 mg  5 mg Oral Daily   • Fluticasone Furoate-Vilanterol 100-25 mcg/actuation 1 puff  1 puff Inhalation Daily   • furosemide (LASIX) injection 40 mg  40 mg Intravenous BID (diuretic)   • gabapentin (NEURONTIN) capsule 100 mg  100 mg Oral HS   • levalbuterol (XOPENEX) inhalation solution 1 25 mg  1 25 mg Nebulization TID   • lidocaine (LIDODERM) 5 % patch 1 patch  1 patch Topical Daily   • methocarbamol (ROBAXIN) tablet 500 mg  500 mg Oral Q6H PRN   • nicotine (NICODERM CQ) 14 mg/24hr TD 24 hr patch 1 patch  1 patch Transdermal Daily   • oseltamivir (TAMIFLU) capsule 75 mg  75 mg Oral Q12H JOSE   • pantoprazole (PROTONIX) EC tablet 40 mg  40 mg Oral Early Morning   • polyethylene glycol (MIRALAX) packet 17 g  17 g Oral Daily PRN   • [START ON 2022] predniSONE tablet 10 mg  10 mg Oral Daily   • predniSONE tablet 20 mg  20 mg Oral Daily   • rivaroxaban (XARELTO) tablet 15 mg  15 mg Oral Daily With Breakfast   • saccharomyces boulardii (FLORASTOR) capsule 250 mg  250 mg Oral BID   • senna (SENOKOT) tablet 8 6 mg  8 6 mg Oral HS   • sodium chloride 0 9 % inhalation solution 3 mL  3 mL Nebulization TID   • umeclidinium 62 5 mcg/actuation inhaler AEPB 1 puff  1 puff Inhalation Daily    and PTA meds:   Prior to Admission Medications   Prescriptions Last Dose Informant Patient Reported? Taking? Albuterol Sulfate (albuterol, FOR EMS ONLY,) (2 5 mg/3 mL) 0 083 % nebulizer solution Unknown  Yes No   Sig: Take 2 5 mg by nebulization every 6 (six) hours as needed for wheezing   DULoxetine (CYMBALTA) 60 mg delayed release capsule Unknown  No No   Sig: TAKE 1 CAPSULE TWICE DAILY   acetylcysteine (MUCOMYST) 200 mg/mL nebulizer solution Unknown  No No   Sig: Take 3 mL (600 mg total) by nebulization 3 (three) times a day   allopurinol (ZYLOPRIM) 100 mg tablet Unknown  No No   Sig: Take 2 tablets (200 mg total) by mouth daily   atorvastatin (LIPITOR) 40 mg tablet Unknown  No No   Sig: Take 1 tablet (40 mg total) by mouth daily with dinner   ceFAZolin (ANCEF) 2000 mg IVPB Unknown  No No   Sig: Inject 2,000 mg into a catheter in a vein every 8 (eight) hours for 28 days   docusate sodium (COLACE) 100 mg capsule Unknown  No No   Sig: Take 1 capsule (100 mg total) by mouth 2 (two) times a day   finasteride (PROSCAR) 5 mg tablet Unknown  No No   Sig: TAKE 1 TABLET DAILY   fluticasone-umeclidinium-vilanterol (Trelegy Ellipta) 200-62 5-25 MCG/INH AEPB inhaler Unknown  No No   Sig: Inhale 1 puff daily Rinse mouth after use    gabapentin (NEURONTIN) 100 mg capsule Unknown  No No   Sig: TAKE ONE CAPSULE BY MOUTH AT BEDTIME   isosorbide mononitrate (IMDUR) 30 mg 24 hr tablet Unknown  Yes No   levalbuterol (XOPENEX) 1 25 mg/0 5 mL nebulizer solution Unknown  No No   Sig: Take 0 5 mL (1 25 mg total) by nebulization 3 (three) times a day   lidocaine (LIDODERM) 5 % Unknown  No No   Sig: Apply 1 patch topically daily Remove & Discard patch within 12 hours or as directed by MD Do not start before December 21, 2022     methocarbamol (ROBAXIN) 500 mg tablet Unknown  No No   Sig: Take 1 tablet (500 mg total) by mouth every 6 (six) hours as needed for muscle spasms (neck pain)   pantoprazole (PROTONIX) 40 mg tablet Unknown  No No   Sig: Take 1 tablet (40 mg total) by mouth daily in the early morning Do not start before November 23, 2022  polyethylene glycol (MIRALAX) 17 g packet Unknown  Yes No   Sig: Take 17 g by mouth daily as needed   predniSONE 10 mg tablet Unknown  No No   Sig: Take 4 tablets (40 mg total) by mouth daily for 3 days, THEN 3 tablets (30 mg total) daily for 3 days, THEN 2 tablets (20 mg total) daily for 3 days, THEN 1 tablet (10 mg total) daily for 3 days  rivaroxaban (XARELTO) 15 mg tablet Unknown  No No   Sig: Take 1 tablet (15 mg total) by mouth daily with breakfast   saccharomyces boulardii (FLORASTOR) 250 mg capsule Unknown  No No   Sig: Take 1 capsule (250 mg total) by mouth 2 (two) times a day for 28 days   senna (SENOKOT) 8 6 mg Unknown  No No   Sig: Take 1 tablet (8 6 mg total) by mouth daily at bedtime   sodium chloride 0 9 % nebulizer solution Unknown  No No   Sig: Take 3 mL by nebulization 3 (three) times a day   torsemide 40 MG TABS Unknown  No No   Sig: Take 40 mg by mouth daily Do not start before November 23, 2022  Facility-Administered Medications: None     No Known Allergies    Objective   Vitals: Blood pressure 121/63, pulse 71, temperature 97 8 °F (36 6 °C), temperature source Axillary, resp  rate 18, height 6' 2" (1 88 m), weight 120 kg (265 lb 6 9 oz), SpO2 98 %    Orthostatic Blood Pressures    Flowsheet Row Most Recent Value   Blood Pressure 121/63 filed at 12/26/2022 2552   Patient Position - Orthostatic VS Sitting filed at 12/26/2022 0755            Intake/Output Summary (Last 24 hours) at 12/26/2022 1009  Last data filed at 12/25/2022 2001  Gross per 24 hour   Intake --   Output 200 ml   Net -200 ml       Invasive Devices     Central Venous Catheter Line  Duration           CVC Central Lines 12/12/22 Single Right Subclavian 13 days          Peripheral Intravenous Line  Duration           Peripheral IV 12/24/22 Right Antecubital 1 day                Physical Exam  Vitals and nursing note reviewed  Constitutional:       Appearance: Normal appearance  He is morbidly obese  HENT:      Right Ear: External ear normal       Left Ear: External ear normal    Eyes:      General: No scleral icterus  Right eye: No discharge  Left eye: No discharge  Cardiovascular:      Rate and Rhythm: Normal rate and regular rhythm  Heart sounds: Murmur heard  Systolic murmur is present with a grade of 3/6  Abdominal:      General: Bowel sounds are normal  There is no distension  Palpations: Abdomen is soft  Musculoskeletal:      Right lower le+ Pitting Edema present  Left lower le+ Pitting Edema present  Comments: Component of venous stasis and lymphedema   Skin:     General: Skin is warm and dry  Capillary Refill: Capillary refill takes less than 2 seconds  Neurological:      General: No focal deficit present  Mental Status: He is alert and oriented to person, place, and time  Mental status is at baseline  Psychiatric:         Mood and Affect: Mood normal          Behavior: Behavior is cooperative  Lab Results:   I have personally reviewed pertinent lab results  CBC with diff:   Results from last 7 days   Lab Units 22  0528   WBC Thousand/uL 10 95*   RBC Million/uL 3 09*   HEMOGLOBIN g/dL 8 1*   HEMATOCRIT % 27 7*   MCV fL 90   MCH pg 26 2*   MCHC g/dL 29 2*   RDW % 20 4*   MPV fL 9 1   PLATELETS Thousands/uL 205     CMP:   Results from last 7 days   Lab Units 22  0528 22  0643 22  1248   SODIUM mmol/L 138   < > 138   CHLORIDE mmol/L 100   < > 101   CO2 mmol/L 33*   < > 34*   BUN mg/dL 27*   < > 19   CREATININE mg/dL 1 45*   < > 1 13   CALCIUM mg/dL 8 5   < > 8 4   AST U/L  --   --  31   ALT U/L  --   --  12   ALK PHOS U/L  --   --  96   EGFR ml/min/1 73sq m 44   < > 60    < > = values in this interval not displayed         BNP:   Results from last 7 days   Lab Units 22  0528   POTASSIUM mmol/L 3 9   CHLORIDE mmol/L 100   CO2 mmol/L 33*   BUN mg/dL 27*   CREATININE mg/dL 1 45*   CALCIUM mg/dL 8 5   EGFR ml/min/1 73sq m 44     Coags:   Results from last 7 days   Lab Units 12/24/22  1248   PTT seconds 40*   INR  2 23*     TSH:     Magnesium:   Results from last 7 days   Lab Units 12/25/22  0643   MAGNESIUM mg/dL 2 2     Lipid Profile:     Imaging: I have personally reviewed pertinent reports      EKG: Paced Rhythm  VTE Prophylaxis: Sequential compression device [place])     Code Status: Level 1 - Full Code  Advance Directive and Living Will: Yes    Power of :    POLST:      Ashley  Cardiology

## 2022-12-26 NOTE — ASSESSMENT & PLAN NOTE
Presented to the ER due to shortness of breath from [facility]  · Influenza A positive  · Continue Tamiflu  · Stable on room air

## 2022-12-26 NOTE — ASSESSMENT & PLAN NOTE
Wt Readings from Last 3 Encounters:   12/25/22 120 kg (265 lb 6 9 oz)   12/07/22 120 kg (265 lb)   11/22/22 121 kg (265 lb 10 5 oz)     Chest x-ray with pulmonary vascular congestion/pulmonary edema with bibasilar atelectasis  · Patient received a dose of IV Lasix in the ER  · IV lasix + albumin today  · Received IV Lasix and IV Bumex on prior admission

## 2022-12-26 NOTE — ASSESSMENT & PLAN NOTE
Lab Results   Component Value Date    EGFR 46 12/25/2022    EGFR 60 12/24/2022    EGFR 53 12/20/2022    CREATININE 1 40 (H) 12/25/2022    CREATININE 1 13 12/24/2022    CREATININE 1 25 12/20/2022     Baseline creatinine 1 3-1 6  Creatinine is at baseline  Repeat labs in a m

## 2022-12-26 NOTE — ASSESSMENT & PLAN NOTE
Presented to the ER due to shortness of breath from [facility]  · Influenza A positive  · Continue Tamiflu  · Stable on room air

## 2022-12-26 NOTE — PROGRESS NOTES
Mandy 45  Progress Note Artist Leon 1940, 80 y o  male MRN: [id number]  Unit/Bed#: [address] [facility] Encounter: [id number]  Primary Care Provider: Heidi Shepherd DO   Date and time admitted to hospital: 12/24/2022 12:08 PM    * Influenza  Assessment & Plan  Presented to the ER due to shortness of breath from [facility]  · Influenza A positive  · Continue Tamiflu  · Stable on room air      Chronic diastolic heart failure (HCC)  Assessment & Plan  Wt Readings from Last 3 Encounters:   12/25/22 120 kg (265 lb 6 9 oz)   12/07/22 120 kg (265 lb)   11/22/22 121 kg (265 lb 10 5 oz)     Chest x-ray with pulmonary vascular congestion/pulmonary edema with bibasilar atelectasis  · Patient received a dose of IV Lasix in the ER  · IV lasix + albumin today  · Received IV Lasix and IV Bumex on prior admission          MSSA bacteremia  Assessment & Plan  On recent admission noted to have MSSA bacteremia  · Cont IV Ancef 2 g every 8 hours for total of 6 weeks  · Patient underwent TTE and MARY on prior admission    COPD (chronic obstructive pulmonary disease) (HCC)  Assessment & Plan  Received 125 mg of IV Solu-Medrol in the ER  On exam no wheezing noted  · Continue prednisone taper as was recommended on prior admission  Anemia  Assessment & Plan  Hemoglobin overall stable  Repeat labwork in AM    Results from last 7 days   Lab Units 12/25/22  0643 12/24/22  1248 12/20/22  0509 12/19/22  0517   HEMOGLOBIN g/dL 8 4* 8 9* 8 3* 7 8*   HEMATOCRIT % 28 7* 30 5* 27 9* 26 9*   MCV fL 90 91  --   --        Essential hypertension  Assessment & Plan  Continue imdur      Stage 3a chronic kidney disease [facility])  Assessment & Plan  Lab Results   Component Value Date    EGFR 46 12/25/2022    EGFR 60 12/24/2022    EGFR 53 12/20/2022    CREATININE 1 40 (H) 12/25/2022    CREATININE 1 13 12/24/2022    CREATININE 1 25 12/20/2022     Baseline creatinine 1 3-1 6  Creatinine is at baseline  Repeat labs in a m     Chronic a-fib (HCC)  Assessment & Plan  Status post pacemaker placement  Continue xarelto        VTE Pharmacologic Prophylaxis: VTE Score: 4 xarelto    Patient Centered Rounds: I performed bedside rounds with nursing staff today  Discussions with Specialists or Other Care Team Provider: yes    Education and Discussions with Family / Patient: Updated  (wife and daughter) at bedside  Time Spent for Care: 35 min  More than 50% of total time spent on counseling and coordination of care as described above  Current Length of Stay: 0 day(s)  Current Patient Status: Inpatient   Certification Statement: The patient will continue to require additional inpatient hospital stay due to influenza A infection, CHF  Discharge Plan: Anticipate discharge in 48-72 hrs to home with home services  Code Status: Level 1 - Full Code    Subjective:     Patient reports feeling better  Wants to know when he can go home  Family prefers taking him home rather than going back to facility    Objective:     Vitals:   Temp (24hrs), Av 6 °F (36 4 °C), Min:97 3 °F (36 3 °C), Max:97 8 °F (36 6 °C)    Temp:  [97 3 °F (36 3 °C)-97 8 °F (36 6 °C)] 97 8 °F (36 6 °C)  HR:  [69-76] 76  Resp:  [16-20] 20  BP: (119-136)/(60-90) 123/61  SpO2:  [88 %-98 %] 88 %  Body mass index is 34 08 kg/m²  Input and Output Summary (last 24 hours): Intake/Output Summary (Last 24 hours) at 2022  Last data filed at 2022  Gross per 24 hour   Intake 460 ml   Output 200 ml   Net 260 ml       Physical Exam:   Physical Exam  Vitals reviewed  Constitutional:       General: He is not in acute distress  Appearance: He is ill-appearing (chronically)  HENT:      Head: Normocephalic and atraumatic  Eyes:      General:         Right eye: No discharge  Left eye: No discharge  Cardiovascular:      Rate and Rhythm: Normal rate and regular rhythm     Pulmonary:      Effort: Pulmonary effort is normal  No respiratory distress  Breath sounds: Normal breath sounds  No wheezing or rales  Abdominal:      General: Bowel sounds are normal  There is no distension  Palpations: Abdomen is soft  Tenderness: There is no abdominal tenderness  Musculoskeletal:      Right lower leg: Edema present  Left lower leg: Edema present  Neurological:      Mental Status: He is alert and oriented to person, place, and time  Additional Data:     Labs:  Results from last 7 days   Lab Units 12/25/22  0643 12/24/22  1248   WBC Thousand/uL 9 62 11 00*   HEMOGLOBIN g/dL 8 4* 8 9*   HEMATOCRIT % 28 7* 30 5*   PLATELETS Thousands/uL 208 204   NEUTROS PCT %  --  89*   LYMPHS PCT %  --  4*   MONOS PCT %  --  6   EOS PCT %  --  0     Results from last 7 days   Lab Units 12/25/22  0643 12/24/22  1248   SODIUM mmol/L 137 138   POTASSIUM mmol/L 4 3 4 4   CHLORIDE mmol/L 99 101   CO2 mmol/L 32 34*   BUN mg/dL 27* 19   CREATININE mg/dL 1 40* 1 13   ANION GAP mmol/L 6 3*   CALCIUM mg/dL 8 5 8 4   ALBUMIN g/dL  --  2 1*   TOTAL BILIRUBIN mg/dL  --  0 72   ALK PHOS U/L  --  96   ALT U/L  --  12   AST U/L  --  31   GLUCOSE RANDOM mg/dL 167* 131     Results from last 7 days   Lab Units 12/24/22  1248   INR  2 23*             Results from last 7 days   Lab Units 12/24/22  1248   LACTIC ACID mmol/L 1 5       Lines/Drains:  Invasive Devices     Central Venous Catheter Line  Duration           CVC Central Lines 12/12/22 Single Right Subclavian 13 days          Peripheral Intravenous Line  Duration           Peripheral IV 12/24/22 Right Antecubital 1 day                Central Line:  Goal for removal: N/A - Discharging with PICC for IV ABX/medications         Imaging: Reviewed radiology reports from this admission including: chest xray    Recent Cultures (last 7 days):   Results from last 7 days   Lab Units 12/24/22  1358 12/24/22  1343   BLOOD CULTURE  No Growth at 24 hrs  No Growth at 24 hrs         Last 24 Hours Medication List: Current Facility-Administered Medications   Medication Dose Route Frequency Provider Last Rate   • acetylcysteine  3 mL Nebulization TID Lynn Revankar, DO     • allopurinol  200 mg Oral Daily Lynn Revankar, DO     • atorvastatin  40 mg Oral Daily With Morgan-Santosh Revankar, DO     • ceFAZolin  2,000 mg Intravenous Q8H Lynn Revankar, DO 2,000 mg (12/25/22 1812)   • docusate sodium  100 mg Oral BID Lynn Revankar, DO     • DULoxetine  60 mg Oral BID Lynn Revankar, DO     • finasteride  5 mg Oral Daily Lynn Revankar, DO     • Fluticasone Furoate-Vilanterol  1 puff Inhalation Daily NAVA Coleman     • gabapentin  100 mg Oral HS Lynn Revankar, DO     • levalbuterol  1 25 mg Nebulization TID Lynn Revankar, DO     • lidocaine  1 patch Topical Daily Lynn Revankar, DO     • methocarbamol  500 mg Oral Q6H PRN Lynn Revankar, DO     • nicotine  1 patch Transdermal Daily Lynn Revankar, DO     • oseltamivir  75 mg Oral Q12H [address] Lynn Revankar, DO     • pantoprazole  40 mg Oral Early Morning Lynn Revankar, DO     • polyethylene glycol  17 g Oral Daily PRN Lynn Revankar, DO     • [START ON 12/29/2022] predniSONE  10 mg Oral Daily Lynn Revankar, DO     • [START ON 12/26/2022] predniSONE  20 mg Oral Daily Lynn Revankar, DO     • rivaroxaban  15 mg Oral Daily With Breakfast Lynn Revankar, DO     • saccharomyces boulardii  250 mg Oral BID Lynn Revankar, DO     • senna  8 6 mg Oral HS Lynn Revankar, DO     • sodium chloride  3 mL Nebulization TID Lynn Revankar, DO     • umeclidinium  1 puff Inhalation Daily NAVA Coleman          Today, Patient Was Seen By: Marietta Dee DO    **Please Note: This note may have been constructed using a voice recognition system  **

## 2022-12-26 NOTE — ASSESSMENT & PLAN NOTE
Hemoglobin overall stable    Repeat labwork in AM    Results from last 7 days   Lab Units 12/25/22  0643 12/24/22  1248 12/20/22  0509 12/19/22  0517   HEMOGLOBIN g/dL 8 4* 8 9* 8 3* 7 8*   HEMATOCRIT % 28 7* 30 5* 27 9* 26 9*   MCV fL 90 91  --   --

## 2022-12-27 ENCOUNTER — TELEPHONE (OUTPATIENT)
Dept: INFECTIOUS DISEASES | Facility: CLINIC | Age: 82
End: 2022-12-27

## 2022-12-27 LAB
ANION GAP SERPL CALCULATED.3IONS-SCNC: 6 MMOL/L (ref 4–13)
BUN SERPL-MCNC: 27 MG/DL (ref 5–25)
CALCIUM SERPL-MCNC: 8.2 MG/DL (ref 8.3–10.1)
CHLORIDE SERPL-SCNC: 98 MMOL/L (ref 96–108)
CO2 SERPL-SCNC: 34 MMOL/L (ref 21–32)
CREAT SERPL-MCNC: 1.45 MG/DL (ref 0.6–1.3)
GFR SERPL CREATININE-BSD FRML MDRD: 44 ML/MIN/1.73SQ M
GLUCOSE SERPL-MCNC: 159 MG/DL (ref 65–140)
POTASSIUM SERPL-SCNC: 3.4 MMOL/L (ref 3.5–5.3)
SODIUM SERPL-SCNC: 138 MMOL/L (ref 135–147)

## 2022-12-27 RX ADMIN — PREDNISONE 20 MG: 20 TABLET ORAL at 08:30

## 2022-12-27 RX ADMIN — ALLOPURINOL 200 MG: 100 TABLET ORAL at 08:30

## 2022-12-27 RX ADMIN — ACETYLCYSTEINE 600 MG: 200 SOLUTION ORAL; RESPIRATORY (INHALATION) at 14:50

## 2022-12-27 RX ADMIN — Medication 250 MG: at 08:31

## 2022-12-27 RX ADMIN — LEVALBUTEROL HYDROCHLORIDE 1.25 MG: 1.25 SOLUTION, CONCENTRATE RESPIRATORY (INHALATION) at 07:17

## 2022-12-27 RX ADMIN — RIVAROXABAN 15 MG: 15 TABLET, FILM COATED ORAL at 08:30

## 2022-12-27 RX ADMIN — FUROSEMIDE 40 MG: 10 INJECTION, SOLUTION INTRAMUSCULAR; INTRAVENOUS at 08:29

## 2022-12-27 RX ADMIN — DULOXETINE HYDROCHLORIDE 60 MG: 60 CAPSULE, DELAYED RELEASE ORAL at 08:30

## 2022-12-27 RX ADMIN — OSELTAMIVIR PHOSPHATE 75 MG: 75 CAPSULE ORAL at 08:30

## 2022-12-27 RX ADMIN — ISODIUM CHLORIDE 3 ML: 0.03 SOLUTION RESPIRATORY (INHALATION) at 14:50

## 2022-12-27 RX ADMIN — ATORVASTATIN CALCIUM 40 MG: 40 TABLET, FILM COATED ORAL at 16:46

## 2022-12-27 RX ADMIN — PANTOPRAZOLE SODIUM 40 MG: 40 TABLET, DELAYED RELEASE ORAL at 05:22

## 2022-12-27 RX ADMIN — LIDOCAINE 5% 1 PATCH: 700 PATCH TOPICAL at 08:31

## 2022-12-27 RX ADMIN — SENNOSIDES 8.6 MG: 8.6 TABLET, FILM COATED ORAL at 21:10

## 2022-12-27 RX ADMIN — ISODIUM CHLORIDE 3 ML: 0.03 SOLUTION RESPIRATORY (INHALATION) at 07:17

## 2022-12-27 RX ADMIN — UMECLIDINIUM 1 PUFF: 62.5 AEROSOL, POWDER ORAL at 08:31

## 2022-12-27 RX ADMIN — DOCUSATE SODIUM 100 MG: 100 CAPSULE, LIQUID FILLED ORAL at 17:01

## 2022-12-27 RX ADMIN — LEVALBUTEROL HYDROCHLORIDE 1.25 MG: 1.25 SOLUTION, CONCENTRATE RESPIRATORY (INHALATION) at 14:50

## 2022-12-27 RX ADMIN — ACETYLCYSTEINE 600 MG: 200 SOLUTION ORAL; RESPIRATORY (INHALATION) at 21:39

## 2022-12-27 RX ADMIN — GABAPENTIN 100 MG: 100 CAPSULE ORAL at 21:10

## 2022-12-27 RX ADMIN — FLUTICASONE FUROATE AND VILANTEROL TRIFENATATE 1 PUFF: 100; 25 POWDER RESPIRATORY (INHALATION) at 08:32

## 2022-12-27 RX ADMIN — CEFAZOLIN SODIUM 2000 MG: 2 SOLUTION INTRAVENOUS at 02:36

## 2022-12-27 RX ADMIN — ACETYLCYSTEINE 600 MG: 200 SOLUTION ORAL; RESPIRATORY (INHALATION) at 07:17

## 2022-12-27 RX ADMIN — FINASTERIDE 5 MG: 5 TABLET, FILM COATED ORAL at 08:30

## 2022-12-27 RX ADMIN — DOCUSATE SODIUM 100 MG: 100 CAPSULE, LIQUID FILLED ORAL at 08:30

## 2022-12-27 RX ADMIN — ISODIUM CHLORIDE 3 ML: 0.03 SOLUTION RESPIRATORY (INHALATION) at 21:39

## 2022-12-27 RX ADMIN — CEFAZOLIN SODIUM 2000 MG: 2 SOLUTION INTRAVENOUS at 10:08

## 2022-12-27 RX ADMIN — OSELTAMIVIR PHOSPHATE 75 MG: 75 CAPSULE ORAL at 21:10

## 2022-12-27 RX ADMIN — ACETAMINOPHEN 650 MG: 325 TABLET, FILM COATED ORAL at 17:01

## 2022-12-27 RX ADMIN — Medication 250 MG: at 17:01

## 2022-12-27 RX ADMIN — ACETAMINOPHEN 650 MG: 325 TABLET, FILM COATED ORAL at 11:00

## 2022-12-27 RX ADMIN — DULOXETINE HYDROCHLORIDE 60 MG: 60 CAPSULE, DELAYED RELEASE ORAL at 17:01

## 2022-12-27 RX ADMIN — LEVALBUTEROL HYDROCHLORIDE 1.25 MG: 1.25 SOLUTION, CONCENTRATE RESPIRATORY (INHALATION) at 21:39

## 2022-12-27 RX ADMIN — CEFAZOLIN SODIUM 2000 MG: 2 SOLUTION INTRAVENOUS at 20:52

## 2022-12-27 RX ADMIN — ACETAMINOPHEN 650 MG: 325 TABLET, FILM COATED ORAL at 05:22

## 2022-12-27 NOTE — PLAN OF CARE
Problem: MOBILITY - ADULT  Goal: Maintain or return to baseline ADL function  Description: INTERVENTIONS:  -  Assess patient's ability to carry out ADLs; assess patient's baseline for ADL function and identify physical deficits which impact ability to perform ADLs (bathing, care of mouth/teeth, toileting, grooming, dressing, etc )  - Assess/evaluate cause of self-care deficits   - Assess range of motion  - Assess patient's mobility; develop plan if impaired  - Assess patient's need for assistive devices and provide as appropriate  - Encourage maximum independence but intervene and supervise when necessary  - Involve family in performance of ADLs  - Assess for home care needs following discharge   - Consider OT consult to assist with ADL evaluation and planning for discharge  - Provide patient education as appropriate  Outcome: Progressing  Goal: Maintains/Returns to pre admission functional level  Description: INTERVENTIONS:  - Perform BMAT or MOVE assessment daily    - Set and communicate daily mobility goal to care team and patient/family/caregiver  - Collaborate with rehabilitation services on mobility goals if consulted  - Perform Range of Motion 2 times a day  - Reposition patient every 2 hours    - Dangle patient 2 times a day  - Stand patient 2 times a day  - Ambulate patient 2 times a day  - Out of bed to chair 2 times a day   - Out of bed for meals 2 times a day  - Out of bed for toileting  - Record patient progress and toleration of activity level   Outcome: Progressing     Problem: Potential for Falls  Goal: Patient will remain free of falls  Description: INTERVENTIONS:  - Educate patient/family on patient safety including physical limitations  - Instruct patient to call for assistance with activity   - Consult OT/PT to assist with strengthening/mobility   - Keep Call bell within reach  - Keep bed low and locked with side rails adjusted as appropriate  - Keep care items and personal belongings within reach  - Initiate and maintain comfort rounds  - Make Fall Risk Sign visible to staff  - Offer Toileting every 2 Hours, in advance of need  - Initiate/Maintain bed/chair alarm  - Obtain necessary fall risk management equipment: call bell  - Apply yellow socks and bracelet for high fall risk patients  - Consider moving patient to room near nurses station  Outcome: Progressing     Problem: PAIN - ADULT  Goal: Verbalizes/displays adequate comfort level or baseline comfort level  Description: Interventions:  - Encourage patient to monitor pain and request assistance  - Assess pain using appropriate pain scale  - Administer analgesics based on type and severity of pain and evaluate response  - Implement non-pharmacological measures as appropriate and evaluate response  - Consider cultural and social influences on pain and pain management  - Notify physician/advanced practitioner if interventions unsuccessful or patient reports new pain  Outcome: Progressing     Problem: INFECTION - ADULT  Goal: Absence or prevention of progression during hospitalization  Description: INTERVENTIONS:  - Assess and monitor for signs and symptoms of infection  - Monitor lab/diagnostic results  - Monitor all insertion sites, i e  indwelling lines, tubes, and drains  - Monitor endotracheal if appropriate and nasal secretions for changes in amount and color  - [place] appropriate cooling/warming therapies per order  - Administer medications as ordered  - Instruct and encourage patient and family to use good hand hygiene technique  - Identify and instruct in appropriate isolation precautions for identified infection/condition  Outcome: Progressing  Goal: Absence of fever/infection during neutropenic period  Description: INTERVENTIONS:  - Monitor WBC    Outcome: Progressing     Problem: SAFETY ADULT  Goal: Maintain or return to baseline ADL function  Description: INTERVENTIONS:  -  Assess patient's ability to carry out ADLs; assess patient's baseline for ADL function and identify physical deficits which impact ability to perform ADLs (bathing, care of mouth/teeth, toileting, grooming, dressing, etc )  - Assess/evaluate cause of self-care deficits   - Assess range of motion  - Assess patient's mobility; develop plan if impaired  - Assess patient's need for assistive devices and provide as appropriate  - Encourage maximum independence but intervene and supervise when necessary  - Involve family in performance of ADLs  - Assess for home care needs following discharge   - Consider OT consult to assist with ADL evaluation and planning for discharge  - Provide patient education as appropriate  Outcome: Progressing  Goal: Maintains/Returns to pre admission functional level  Description: INTERVENTIONS:  - Perform BMAT or MOVE assessment daily    - Set and communicate daily mobility goal to care team and patient/family/caregiver  - Collaborate with rehabilitation services on mobility goals if consulted  - Perform Range of Motion 2 times a day  - Reposition patient every 2 hours    - Dangle patient 2 times a day  - Stand patient 2 times a day  - Ambulate patient 2 times a day  - Out of bed to chair 2 times a day   - Out of bed for meals 2 times a day  - Out of bed for toileting  - Record patient progress and toleration of activity level   Outcome: Progressing  Goal: Patient will remain free of falls  Description: INTERVENTIONS:  - Educate patient/family on patient safety including physical limitations  - Instruct patient to call for assistance with activity   - Consult OT/PT to assist with strengthening/mobility   - Keep Call bell within reach  - Keep bed low and locked with side rails adjusted as appropriate  - Keep care items and personal belongings within reach  - Initiate and maintain comfort rounds  - Make Fall Risk Sign visible to staff  - Offer Toileting every 2 Hours, in advance of need  - Initiate/Maintain bed/chair alarm  - Obtain necessary fall risk management equipment: Call bell  - Apply yellow socks and bracelet for high fall risk patients  - Consider moving patient to room near nurses station  Outcome: Progressing     Problem: DISCHARGE PLANNING  Goal: Discharge to home or other facility with appropriate resources  Description: INTERVENTIONS:  - Identify barriers to discharge w/patient and caregiver  - Arrange for needed discharge resources and transportation as appropriate  - Identify discharge learning needs (meds, wound care, etc )  - Arrange for interpretive services to assist at discharge as needed  - Refer to Case Management Department for coordinating discharge planning if the patient needs post-hospital services based on physician/advanced practitioner order or complex needs related to functional status, cognitive ability, or social support system  Outcome: Progressing     Problem: Knowledge Deficit  Goal: Patient/family/caregiver demonstrates understanding of disease process, treatment plan, medications, and discharge instructions  Description: Complete learning assessment and assess knowledge base    Interventions:  - Provide teaching at level of understanding  - Provide teaching via preferred learning methods  Outcome: Progressing     Problem: RESPIRATORY - ADULT  Goal: Achieves optimal ventilation and oxygenation  Description: INTERVENTIONS:  - Assess for changes in respiratory status  - Assess for changes in mentation and behavior  - Position to facilitate oxygenation and minimize respiratory effort  - Oxygen administered by appropriate delivery if ordered  - Initiate smoking cessation education as indicated  - Encourage broncho-pulmonary hygiene including cough, deep breathe, Incentive Spirometry  - Assess the need for suctioning and aspirate as needed  - Assess and instruct to report SOB or any respiratory difficulty  - Respiratory Therapy support as indicated  Outcome: Progressing

## 2022-12-27 NOTE — CONSULTS
Consultation - Infectious Disease   Michelle Tracy 80 y o  male MRN: [id number]  Unit/Bed#: 2 [place] [address] Encounter: [id number]      IMPRESSION & RECOMMENDATIONS:     1  MSSA bacteremia  Prior admission for MSSA bacteremia, recently discharged 12/20  Unclear source, may be skin translocation as the patient has multiple scabs on his arms and legs  This was complicated by the presence of a PPM  TTE with AV and MV thickening, no clear vegetations  MARY with no obvious large vegetations, but given significant calcification cannot rule out small lesion  CT spine without any abnormalities  CT chest with numerous pulmonary nodules, could be septic emboli vs less likely metastatic disease  This does raise concern for a primary endovascular source of infection  Discussed treatment options with the family and Cardiology   PPM removal is recommended in the setting of staph bacteremia regardless of the primary source, however the pacemaker has been in place 12 years and removal would be an extensive surgical procedure  Given his comorbidities, his family family elected to pursue a more conservative approach with antibiotics before any surgery  Cultures no growth this admission   -continue IV Cefazolin 2g q8hr  -Plan to complete a 6 week course of antibiotics from blood culture clearance through 1/17/23 (script provided to CM the patient now once to go home on IV antibiotics)  -Weekly CBC with differential and BMP on IV antibiotics  -will need surveillance blood cultures 2 weeks after completion of antibiotic course  -if there is recurrence, the patient will require pacemaker extraction  -will arrange outpatient ID follow-up 2 weeks after discharge  -Patient with tunneled PICC line in place, will need IR referral for PICC line removal after completion of IV antibiotics     2  Influenza A infection  Patient is not hypoxic and he is afebrile    -Continue Tamiflu to complete a 5-day course   -Monitor oxygen    3    Acute on chronic heart failure  Patient with evidence of volume overload, chest x-ray with pulmonary edema  Improving with diuresis  -Cardiology following   -Continue diuresis     4  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  Complicated by MSSA bacteremia  MARY no clear vegetations during prior admission   -continue management as above for bacteremia     5  Pulmonary nodules and adenopathy  CT chest during prior admission showed numerous new pulmonary nodules and mediastinal and hilar lymphadenopathy, concerning for metastases however in setting of staph aureus bacteremia could be septic emboli  Patient had a colonoscopy last month without abnormalities  He is a long term smoker    -Recommend repeat CT chest prior to discharge; if persistent will require work up for malignancy     6  CKD  Creatinine stable at baseline    -Antibiotics dose adjusted as needed  -Monitor creatinine    I have discussed the above management plan in detail with the primary service, the patient and his wife at bedside  ID will follow  I have performed an extensive review of the medical records in Epic including review of the notes, radiographs, and laboratory results     HISTORY OF PRESENT ILLNESS:  Reason for Consult: Staff aureus bacteremia  HPI: Maral Witt is an 80year old man with a history of PPM insertion, COPD, CHF, CKD, longstanding tobacco abuse who was recently admitted to the [address] [place] [address] 12/5 to 12/20/2022 with weakness at home  The patient was found to have MSSA bacteremia  CT chest showed numerous new pulmonary nodules and mediastinal and hilar lymphadenopathy, concerning for metastases  CTH showed a hypodensity in the right parieto-occipital region most likely a chronic infarct or metastatic lesion  Patient was unable to get an 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  MARY with no obvious large vegetations, but given significant calcification cannot rule out small lesion  Discussed treatment options with the family and Cardiology    PPM removal is recommended in the setting of staph bacteremia regardless of the primary source, however the pacemaker has been in place 12 years and removal would be an extensive surgical procedure  Given his comorbidities, he and his family elected to pursue a more conservative approach with antibiotics before any surgery  The patient was discharged to rehab on a 6-week course of IV cefazolin through 1/17/2023  The patient was readmitted from rehab on 12/24/2022 with worsening shortness of breath for 1 day  Chest x-ray on admission showed pulmonary edema  The patient was also found to be influenza A positive  He was not hypoxic  Tamiflu was started and the patient was also diuresed with IV Lasix  Cardiology is following for management of heart failure  Patient and his family now wish for the patient to return home after discharge with IV antibiotics  ID is consulted for ongoing management  Currently the patient states that his breathing is at baseline  He denies current cough, fever, chills  He denies joint pain  Patient reports chronic back pain that comes and goes and has not changed recently  REVIEW OF SYSTEMS:  A complete review of systems is negative other than that noted in the HPI      PAST MEDICAL HISTORY:  Past Medical History:   Diagnosis Date   • Arthritis    • Atrial flutter ([address] )    • Chronic kidney disease     stage 3   • Coronary artery disease     2 stents   • Fluid retention    • Gout    • Heart disease    • Heart failure (HCC)     pacemaker   • Hypertension    • Hyponatremia 04/08/2019   • Neurological disorder    • Pacemaker    • Pulmonary emphysema ([address] )    • Radiculopathy     last assessed 1/28/16    • Shortness of breath     exertion   • Sleep apnea     c pap     Past Surgical History:   Procedure Laterality Date   • ANGIOPLASTY      x2 2 stents and then replaced   • CARDIAC PACEMAKER PLACEMENT      pacemaker permanent placement dual chamber / last assessed 4/7/14 / implantation    • CARDIAC SURGERY      pacemaker   • CHOLECYSTECTOMY     • CORONARY ANGIOPLASTY WITH STENT PLACEMENT     • EPIDURAL BLOCK INJECTION N/A 2016    Procedure: BLOCK / INJECTION EPIDURAL STEROID LUMBAR  L4-5;  Surgeon: June Nelson MD;  Location: [place] [address] MAIN OR;  Service:    • EPIDURAL BLOCK INJECTION N/A 2019    Procedure: L4 L5 Lumbar Epidural Steroid Injection;  Surgeon: June Nelson MD;  Location: [place] [address] MAIN OR;  Service: Pain Management    • EYE SURGERY      cataract left   • HAND SURGERY     • HIP PINNING Left    • INSERT / REPLACE / REMOVE PACEMAKER     • IR TUNNELED CENTRAL LINE PLACEMENT  2022   • KNEE ARTHROSCOPY W/ MENISCAL REPAIR Left    • KNEE SURGERY     • LUMBAR EPIDURAL INJECTION N/A 2016    Procedure: BLOCK / INJECTION LUMBAR  L4-5  (C-ARM); Surgeon: June Nelson MD;  Location: [facility] MAIN OR;  Service:    • WI OPTX FEM SHFT FX W/INSJ IMED IMPLT W/WO SCREW Right 2022    Procedure: INSERTION NAIL IM FEMUR ANTEGRADE (TROCHANTERIC); Surgeon: Marguerite Vaca MD;  Location:  Main OR;  Service: Orthopedics       FAMILY HISTORY:  Non-contributory    SOCIAL HISTORY:  Social History   Social History     Substance and Sexual Activity   Alcohol Use Never     Social History     Substance and Sexual Activity   Drug Use No     Social History     Tobacco Use   Smoking Status Every Day   • Packs/day: 0 25   • Years: 50 00   • Pack years: 12 50   • Types: Cigarettes   • Start date: 2022   Smokeless Tobacco Former   Tobacco Comments    quit 2021       ALLERGIES:  No Known Allergies    MEDICATIONS:  All current active medications have been reviewed      PHYSICAL EXAM:  Temp:  [97 2 °F (36 2 °C)-98 °F (36 7 °C)] 98 °F (36 7 °C)  HR:  [69-74] 72  Resp:  [16-20] 20  BP: (102-137)/(38-88) 102/38  SpO2:  [91 %-98 %] 97 %  Temp (24hrs), Av 6 °F (36 4 °C), Min:97 2 °F (36 2 °C), Max:98 °F (36 7 °C)  Current: Temperature: 98 °F (36 7 °C)    Intake/Output Summary (Last 24 hours) at 12/27/2022 1659  Last data filed at 12/27/2022 1401  Gross per 24 hour   Intake 570 ml   Output 1600 ml   Net -1030 ml       General Appearance:   Chronically debilitated appearing but patient is in no acute distress   Head:  Normocephalic, without obvious abnormality, atraumatic   Eyes:  Conjunctiva pink and sclera anicteric, both eyes   Nose: Nares normal, mucosa normal, no drainage   Throat: Oropharynx moist without lesions   Neck: Supple, symmetrical, no adenopathy   Back:   Symmetric, no curvature, ROM normal, no CVA tenderness   Lungs:    Decreased breath sounds bilaterally   Chest Wall:  No tenderness or deformity   Heart:  RRR; no murmur, rub or gallop   Abdomen:   Soft, non-tender, non-distended, positive bowel sounds    Extremities: No cyanosis, clubbing or edema   Skin:  Multiple ecchymoses on the arms   Lymph nodes: Cervical, supraclavicular nodes normal   Neurologic: Alert and oriented times 3, extremity strength 5/5 and symmetric       LABS, IMAGING, & OTHER STUDIES:  Lab Results:  I have personally reviewed pertinent labs  Results from last 7 days   Lab Units 12/26/22  0528 12/25/22  0643 12/24/22  1248   WBC Thousand/uL 10 95* 9 62 11 00*   HEMOGLOBIN g/dL 8 1* 8 4* 8 9*   PLATELETS Thousands/uL 205 208 204     Results from last 7 days   Lab Units 12/27/22  1015 12/26/22  0528 12/25/22  0643 12/24/22  1248   SODIUM mmol/L 138 138 137 138   POTASSIUM mmol/L 3 4* 3 9 4 3 4 4   CHLORIDE mmol/L 98 100 99 101   CO2 mmol/L 34* 33* 32 34*   BUN mg/dL 27* 27* 27* 19   CREATININE mg/dL 1 45* 1 45* 1 40* 1 13   EGFR ml/min/1 73sq m 44 44 46 60   CALCIUM mg/dL 8 2* 8 5 8 5 8 4   AST U/L  --   --   --  31   ALT U/L  --   --   --  12   ALK PHOS U/L  --   --   --  96     Results from last 7 days   Lab Units 12/24/22  1358 12/24/22  1343   BLOOD CULTURE  No Growth at 48 hrs  No Growth at 48 hrs  Imaging Studies:   I have personally reviewed pertinent imaging study reports and images in PACS      Chest x-ray 12/24: Cardiomegaly and pulmonary vascular congestion, pulmonary edema    Other Studies:   I have personally reviewed pertinent reports

## 2022-12-27 NOTE — PROGRESS NOTES
[address]  Progress Note She Curtis 1940, 80 y o  male MRN: [id number]  Unit/Bed#: [address] [facility],[address] Floor Encounter: [id number]  Primary Care Provider: Teresa Funes DO   Date and time admitted to hospital: 12/24/2022 12:08 PM    * Influenza  Assessment & Plan  Presented to the ER due to shortness of breath from [facility]  · Influenza A positive  · Continue Tamiflu  · Stable on room air      Chronic diastolic heart failure (HCC)  Assessment & Plan  Wt Readings from Last 3 Encounters:   12/25/22 120 kg (265 lb 6 9 oz)   12/07/22 120 kg (265 lb)   11/22/22 121 kg (265 lb 10 5 oz)     Chest x-ray with pulmonary vascular congestion/pulmonary edema with bibasilar atelectasis  · Patient received a dose of IV Lasix in the ER  · IV lasix + albumin given 12/25 and place patient on 40 mg of IV Lasix twice daily  · Patient is to follow-up after discharge for possible aortic valve replacement  · Received IV Lasix and IV Bumex on prior admission          MSSA bacteremia  Assessment & Plan  On recent admission noted to have MSSA bacteremia  · Cont IV Ancef 2 g every 8 hours for total of 6 weeks  · Patient underwent TTE and MARY on prior admission    COPD (chronic obstructive pulmonary disease) ([facility])  Assessment & Plan  Received 125 mg of IV Solu-Medrol in the ER  On exam on wheezing  · Continue prednisone taper as was recommended on prior admission      Anemia  Assessment & Plan  Hemoglobin overall stable    Results from last 7 days   Lab Units 12/26/22  0528 12/25/22  0643 12/24/22  1248 12/20/22  0509   HEMOGLOBIN g/dL 8 1* 8 4* 8 9* 8 3*   HEMATOCRIT % 27 7* 28 7* 30 5* 27 9*   MCV fL 90 90 91  --        Essential hypertension  Assessment & Plan  Continue Imdur    Stage 3a chronic kidney disease [facility])  Assessment & Plan  Lab Results   Component Value Date    EGFR 44 12/26/2022    EGFR 46 12/25/2022    EGFR 60 12/24/2022    CREATININE 1 45 (H) 12/26/2022    CREATININE 1 40 (H) 12/25/2022 CREATININE 1 13 2022     Baseline creatinine 1 3-1 6  Creatinine remains at baseline  Repeat labs in a m  Chronic a-fib [facility])  Assessment & Plan  Status post pacemaker placement  Continue xarelto        VTE Pharmacologic Prophylaxis: VTE Score: 4 Moderate Risk (Score 3-4) - Pharmacological DVT Prophylaxis Ordered: rivaroxaban (Xarelto)  Patient Centered Rounds: I performed bedside rounds with nursing staff today  Discussions with Specialists or Other Care Team Provider: yes - cardio    Education and Discussions with Family / Patient: Updated  (daughter) via phone  Time Spent for Care: 40 min  More than 50% of total time spent on counseling and coordination of care as described above  Current Length of Stay: 1 day(s)  Current Patient Status: Inpatient   Certification Statement: The patient will continue to require additional inpatient hospital stay due to Influenza A, leg swelling  Discharge Plan: Anticipate discharge in 24-48 hrs to home with home services  Code Status: Level 1 - Full Code    Subjective:     States his breathing feels a little rough today    Objective:     Vitals:   Temp (24hrs), Av 8 °F (36 6 °C), Min:97 8 °F (36 6 °C), Max:97 8 °F (36 6 °C)    Temp:  [97 8 °F (36 6 °C)] 97 8 °F (36 6 °C)  HR:  [71-76] 71  Resp:  [18-20] 18  BP: (121-166)/(61-77) 137/72  SpO2:  [88 %-98 %] 97 %  Body mass index is 34 08 kg/m²  Input and Output Summary (last 24 hours): Intake/Output Summary (Last 24 hours) at 2022 1932  Last data filed at 2022 1554  Gross per 24 hour   Intake 10 ml   Output 418 ml   Net -408 ml       Physical Exam:   Physical Exam  Vitals reviewed  Constitutional:       General: He is not in acute distress  Appearance: He is ill-appearing (Chronically)  HENT:      Head: Normocephalic and atraumatic  Eyes:      General:         Right eye: No discharge  Left eye: No discharge     Cardiovascular:      Rate and Rhythm: Normal rate and regular rhythm  Heart sounds: Murmur heard  Pulmonary:      Effort: Pulmonary effort is normal  No respiratory distress  Breath sounds: Normal breath sounds  No wheezing or rales  Abdominal:      General: Bowel sounds are normal  There is no distension  Palpations: Abdomen is soft  Tenderness: There is no abdominal tenderness  Musculoskeletal:      Right lower leg: Edema present  Left lower leg: Edema present  Neurological:      Mental Status: He is alert and oriented to person, place, and time  Additional Data:     Labs:  Results from last 7 days   Lab Units 12/26/22 0528 12/25/22  0643 12/24/22  1248   WBC Thousand/uL 10 95*   < > 11 00*   HEMOGLOBIN g/dL 8 1*   < > 8 9*   HEMATOCRIT % 27 7*   < > 30 5*   PLATELETS Thousands/uL 205   < > 204   NEUTROS PCT %  --   --  89*   LYMPHS PCT %  --   --  4*   MONOS PCT %  --   --  6   EOS PCT %  --   --  0    < > = values in this interval not displayed  Results from last 7 days   Lab Units 12/26/22 0528 12/25/22  0643 12/24/22  1248   SODIUM mmol/L 138   < > 138   POTASSIUM mmol/L 3 9   < > 4 4   CHLORIDE mmol/L 100   < > 101   CO2 mmol/L 33*   < > 34*   BUN mg/dL 27*   < > 19   CREATININE mg/dL 1 45*   < > 1 13   ANION GAP mmol/L 5   < > 3*   CALCIUM mg/dL 8 5   < > 8 4   ALBUMIN g/dL  --   --  2 1*   TOTAL BILIRUBIN mg/dL  --   --  0 72   ALK PHOS U/L  --   --  96   ALT U/L  --   --  12   AST U/L  --   --  31   GLUCOSE RANDOM mg/dL 136   < > 131    < > = values in this interval not displayed       Results from last 7 days   Lab Units 12/24/22  1248   INR  2 23*             Results from last 7 days   Lab Units 12/24/22  1248   LACTIC ACID mmol/L 1 5       Lines/Drains:  Invasive Devices     Central Venous Catheter Line  Duration           CVC Central Lines 12/12/22 Single Right Subclavian 14 days          Peripheral Intravenous Line  Duration           Peripheral IV 12/24/22 Right Antecubital 2 days Central Line:  Goal for removal: N/A - Discharging with PICC for IV ABX/medications             Imaging: No pertinent imaging reviewed  Recent Cultures (last 7 days):   Results from last 7 days   Lab Units 12/24/22  1358 12/24/22  1343   BLOOD CULTURE  No Growth at 48 hrs  No Growth at 48 hrs         Last 24 Hours Medication List:   Current Facility-Administered Medications   Medication Dose Route Frequency Provider Last Rate   • acetylcysteine  3 mL Nebulization TID Lynn Revankar, DO     • allopurinol  200 mg Oral Daily Lynn Revankar, DO     • atorvastatin  40 mg Oral Daily With Kendall-Santosh Revankar, DO     • ceFAZolin  2,000 mg Intravenous Q8H Lynn Revankar, DO 2,000 mg (12/26/22 1154)   • docusate sodium  100 mg Oral BID Lynn Revankar, DO     • DULoxetine  60 mg Oral BID Lynn Revankar, DO     • finasteride  5 mg Oral Daily Lynn Revankar, DO     • Fluticasone Furoate-Vilanterol  1 puff Inhalation Daily NAVA Coleman     • furosemide  40 mg Intravenous BID (diuretic) Lynn Revankar, DO     • gabapentin  100 mg Oral HS Lynn Revankar, DO     • levalbuterol  1 25 mg Nebulization TID Lynn Revankar, DO     • lidocaine  1 patch Topical Daily Lynn Revankar, DO     • methocarbamol  500 mg Oral Q6H PRN Lynn Revankar, DO     • nicotine  1 patch Transdermal Daily Lynn Revankar, DO     • oseltamivir  75 mg Oral Q12H [address] Lynn Revankar, DO     • pantoprazole  40 mg Oral Early Morning Lynn Revankar, DO     • polyethylene glycol  17 g Oral Daily PRN Lynn Revankar, DO     • [START ON 12/29/2022] predniSONE  10 mg Oral Daily Lynn Revankar, DO     • predniSONE  20 mg Oral Daily Lynn Revankar, DO     • rivaroxaban  15 mg Oral Daily With Breakfast Lynn Revankar, DO     • saccharomyces boulardii  250 mg Oral BID Lynn Revankar, DO     • senna  8 6 mg Oral HS Lynn Revankar, DO     • sodium chloride  3 mL Nebulization TID Lynn Revankar, DO     • umeclidinium  1 puff Inhalation Daily NAVA Felton          Today, Patient Was Seen By: Marietta Dee DO    **Please Note: This note may have been constructed using a voice recognition system  **

## 2022-12-27 NOTE — ASSESSMENT & PLAN NOTE
Lab Results   Component Value Date    EGFR 44 12/27/2022    EGFR 44 12/26/2022    EGFR 46 12/25/2022    CREATININE 1 45 (H) 12/27/2022    CREATININE 1 45 (H) 12/26/2022    CREATININE 1 40 (H) 12/25/2022     Baseline creatinine 1 3-1 6  Creatinine continues to remain at baseline  Repeat labs in a m

## 2022-12-27 NOTE — PLAN OF CARE
Problem: PHYSICAL THERAPY ADULT  Goal: Performs mobility at highest level of function for planned discharge setting  See evaluation for individualized goals  Description: Treatment/Interventions: Functional transfer training, LE strengthening/ROM, Elevations, Therapeutic exercise, Endurance training, Patient/family training, Equipment eval/education, Gait training  Equipment Recommended:  (pt has a walker and a wc)       See flowsheet documentation for full assessment, interventions and recommendations  Note: Prognosis: Good  Problem List: Decreased strength, Decreased endurance, Impaired balance, Decreased mobility  Assessment: Patient is an 80y o  year old male seen for Physical Therapy evaluation  Patient admitted with Influenza  Comorbidities affecting patient's physical performance include: chronic pain, lumbar radiculopathy, gait dysfunction, COPD, afib, heart failure  Personal factors affecting patient at time of initial evaluation include: ambulating with assistive device, stairs to enter home and inability to navigate level surfaces without external assistance  Prior to admission, patient was independent with functional mobility with walker approximately 3 weeks ago, pt was most recently in [facility] Please find objective findings from Physical Therapy assessment regarding body systems outlined above with impairments and limitations including weakness, impaired balance, decreased endurance, gait deviations, decreased activity tolerance, decreased functional mobility tolerance and fall risk  The Barthel Index was used as a functional outcome tool presenting with a score of Barthel Index Score: 45 today indicating marked limitations of functional mobility and ADLS  Patient's clinical presentation is currently unstable/unpredictable as seen in patient's presentation of increased fall risk, new onset of impairment of functional mobility, decreased endurance and new onset of weakness   Pt would benefit from continued Physical Therapy treatment to address deficits as defined above and maximize level of functional mobility  As demonstrated by objective findings, the assigned level of complexity for this evaluation is high  The patient's AM-PAC Basic Mobility Inpatient Short Form Raw Score is 17  A Raw score of less than or equal to 16 suggests the patient may benefit from discharge to post-acute rehabilitation services  PT Discharge Recommendation: Post acute rehabilitation services    See flowsheet documentation for full assessment

## 2022-12-27 NOTE — PHYSICAL THERAPY NOTE
PHYSICAL THERAPY EVALUATION/TREATMENT     12/27/22 1040   Note Type   Note type Evaluation   Pain Assessment   Pain Assessment Tool 0-10   Pain Score No Pain   Restrictions/Precautions   Other Precautions Fall Risk; Chair Alarm; Bed Alarm   Home Living   Type of [address] [facility] One level  (2 NATHEN)   [address]; Wheelchair-manual   Prior Function   Level of [place]   (ambulates with walker or uses WC)   Lives With Spouse  (daughters)   Receives Help From [employer]   [employer]ennifer[employer]   Additional Pertinent History Pt admitted from [facility] with diagnosis of influenza  Family/Caregiver Present No   Cognition   Arousal/Participation Cooperative   Orientation Level Oriented X4   Subjective   Subjective "I want to go home"   RLE Assessment   RLE Assessment   (ROM WFL, MMT 4/5)   LLE Assessment   LLE Assessment   (ROM WFL, MMT 4/5)   Transfers   Sit to Stand 4  Minimal assistance   Stand to Sit 4  Minimal assistance   Stand pivot 4  Minimal assistance   Ambulation/Elevation   Gait pattern   (flexed trunk, knees, walks on toes)   Gait Assistance 4  Minimal assist and verbal cues for upright posture and to keep feet inside the walker  Assistive Device Rolling walker   Distance 10 feet with chair follow   Balance   Static Sitting Fair +   Dynamic Sitting Fair   Static Standing Fair   Dynamic Standing Fair -   Ambulatory Fair -   Activity Tolerance   Activity Tolerance Patient tolerated treatment well;Patient limited by fatigue   Assessment   Prognosis Good   Problem List Decreased strength;Decreased endurance; Impaired balance;Decreased mobility   Assessment Patient is an 80y o  year old male seen for Physical Therapy evaluation  Patient admitted with Influenza  Comorbidities affecting patient's physical performance include: chronic pain, lumbar radiculopathy, gait dysfunction, COPD, afib, heart failure    Personal factors affecting patient at time of initial evaluation include: ambulating with assistive device, stairs to enter home and inability to navigate level surfaces without external assistance  Prior to admission, patient was independent with functional mobility with walker approximately 3 weeks ago, pt was most recently in [facility]  [facility] Please find objective findings from Physical Therapy assessment regarding body systems outlined above with impairments and limitations including weakness, impaired balance, decreased endurance, gait deviations, decreased activity tolerance, decreased functional mobility tolerance and fall risk  The Barthel Index was used as a functional outcome tool presenting with a score of Barthel Index Score: 45 today indicating marked limitations of functional mobility and ADLS  Patient's clinical presentation is currently unstable/unpredictable as seen in patient's presentation of increased fall risk, new onset of impairment of functional mobility, decreased endurance and new onset of weakness  Pt would benefit from continued Physical Therapy treatment to address deficits as defined above and maximize level of functional mobility  As demonstrated by objective findings, the assigned level of complexity for this evaluation is high  The patient's AM-[facility] Basic Mobility Inpatient Short Form Raw Score is 17  A Raw score of less than or equal to 16 suggests the patient may benefit from discharge to post-acute rehabilitation services     Goals   Patient Goals "get stronger, go home"   STG Expiration Date 01/03/23   Short Term Goal #1 Independent bed mobility, supervision transfers, supervision ambulation with a rolling walker 30 feet indoor level surfaces, no falls   LTG Expiration Date 01/10/23   Long Term Goal #1 Independent ambulation indoor level surfaces with rolling walker 75 feet so patient can negotiate his home, supervision up-and-down 2 steps the patient can enter and exit his home   Plan   Treatment/Interventions Functional transfer training;LE strengthening/ROM; Elevations; Therapeutic exercise; Endurance training;Patient/family training;Equipment eval/education;Gait training   PT Frequency Other (Comment)  (5x/w)   Recommendation   PT Discharge Recommendation Post acute rehabilitation services   Equipment Recommended   (pt has a walker and a wc)   AM-PAC Basic Mobility Inpatient   Turning in Bed Without Bedrails 3   Lying on Back to Sitting on Edge of Flat Bed 2   Moving Bed to Chair 3   Standing Up From Chair 3   Walk in Room 3   Climb 3-5 Stairs 2   Basic Mobility Inpatient Raw Score 16   Basic Mobility Standardized Score 38 32   Highest Level Of Mobility   -HL Goal 5: Stand one or more mins   -HL Achieved 7: Walk 25 feet or more   Barthel Index   Feeding 10   Bathing 0   Grooming Score 0   Dressing Score 5   Bladder Score 5   Bowels Score 5   Toilet Use Score 5   Transfers (Bed/Chair) Score 10   Mobility (Level Surface) Score 0   Stairs Score 5   Barthel Index Score 45   Additional Treatment Session   Start Time 1030   End Time 1040   Treatment Assessment S:  I want to do more O:  Pt ambulated with a rolling walker with supervision/min assist x 25 feet with a chair follow as pt needs to sit quickly when fatigued  x 20 each ankle pumps, LAQ, hip flexion seated in chair  A:  Pt demonstrated improved endurance with practice  Pt' s gait is mildly unsteady and pt fatigues quickly  Pt does not appear to be at his baseline so recommend STR however pt declines  P:  Continue PT to improve balance, transfers, and functional ambulation with a walker  End of Consult   Patient Position at End of Consult All needs within reach;Bed/Chair alarm activated; Bedside chair   Licensure   [address] [facility] License Number  Jc VIMAL [id number]

## 2022-12-27 NOTE — ASSESSMENT & PLAN NOTE
Presented to the ER due to shortness of breath from [facility][1]  · Influenza A positive  · Continue Tamiflu to complete a 5-day course  · Stable on room air

## 2022-12-27 NOTE — ASSESSMENT & PLAN NOTE
Received 125 mg of IV Solu-Medrol in the ER  On exam on wheezing  · Continue with prednisone taper as was recommended on prior admission

## 2022-12-27 NOTE — PROGRESS NOTES
[address]  Progress Note Rocky Valencia 1940, 80 y o  male MRN: [id number]  Unit/Bed#: [address] [place] [address] Encounter: [id number]  Primary Care Provider: Ann Marie Jaimes DO   Date and time admitted to hospital: 12/24/2022 12:08 PM    * Influenza  Assessment & Plan  Presented to the ER due to shortness of breath from [facility]  · Influenza A positive  · Continue Tamiflu to complete a 5-day course  · Stable on room air      Chronic diastolic heart failure (HCC)  Assessment & Plan  Wt Readings from Last 3 Encounters:   12/27/22 118 kg (260 lb 2 3 oz)   12/07/22 120 kg (265 lb)   11/22/22 121 kg (265 lb 10 5 oz)     Chest x-ray with pulmonary vascular congestion/pulmonary edema with bibasilar atelectasis  · Patient received a dose of IV Lasix in the ER  · IV lasix + albumin given 12/25  Continue 40 mg of IV Lasix twice daily  · Discussed with patient and family the importance of follow-up after discharge for possible aortic valve replacement  · Received IV Lasix and IV Bumex on prior admission          MSSA bacteremia  Assessment & Plan  On recent admission noted to have MSSA bacteremia  · Cont IV Ancef 2 g every 8 hours for total of 6 weeks  · Patient underwent TTE and MARY on prior admission  · ID consulted as patient is now going home with home infusions and this will need to be set up    COPD (chronic obstructive pulmonary disease) (HCC)  Assessment & Plan  Received 125 mg of IV Solu-Medrol in the ER  On exam on wheezing  · Continue with prednisone taper as was recommended on prior admission  Anemia  Assessment & Plan  Hemoglobin stable  Repeat lab work in a m      Results from last 7 days   Lab Units 12/26/22  0528 12/25/22  0643 12/24/22  1248   HEMOGLOBIN g/dL 8 1* 8 4* 8 9*   HEMATOCRIT % 27 7* 28 7* 30 5*   MCV fL 90 90 91       Essential hypertension  Assessment & Plan  Continue Imdur    Stage 3a chronic kidney disease [facility])  Assessment & Plan  Lab Results   Component Value Date    EGFR 44 2022    EGFR 44 2022    EGFR 46 2022    CREATININE 1 45 (H) 2022    CREATININE 1 45 (H) 2022    CREATININE 1 40 (H) 2022     Baseline creatinine 1 3-1 6  Creatinine continues to remain at baseline  Repeat labs in a m  Chronic a-fib (HCC)  Assessment & Plan  Status post pacemaker placement  Continue Xarelto        VTE Pharmacologic Prophylaxis: VTE Score: 4 Moderate Risk (Score 3-4) - Pharmacological DVT Prophylaxis Ordered: rivaroxaban (Xarelto)  Patient Centered Rounds: I performed bedside rounds with nursing staff today  Discussions with Specialists or Other Care Team Provider: yes - cardio    Education and Discussions with Family / Patient: Updated  (wife) at bedside  Time Spent for Care: 40 min  More than 50% of total time spent on counseling and coordination of care as described above  Current Length of Stay: 2 day(s)  Current Patient Status: Inpatient   Certification Statement: The patient will continue to require additional inpatient hospital stay due to Volume overload/CHF requiring IV Lasix  Discharge Plan: Anticipate discharge in 24-48 hrs to home with home services  Code Status: Level 1 - Full Code    Subjective:     Patient states he is doing significantly better  Breathing has improved  Neck pain improving    Objective:     Vitals:   Temp (24hrs), Av 5 °F (36 4 °C), Min:97 2 °F (36 2 °C), Max:98 °F (36 7 °C)    Temp:  [97 2 °F (36 2 °C)-98 °F (36 7 °C)] 97 3 °F (36 3 °C)  HR:  [69-74] 71  Resp:  [16-20] 20  BP: (122-137)/(63-88) 122/71  SpO2:  [91 %-98 %] 94 %  Body mass index is 33 4 kg/m²  Input and Output Summary (last 24 hours): Intake/Output Summary (Last 24 hours) at 2022 1553  Last data filed at 2022 1401  Gross per 24 hour   Intake 570 ml   Output 1618 ml   Net -1048 ml       Physical Exam:   Physical Exam  Vitals reviewed     Constitutional:       General: He is not in acute distress  Appearance: He is ill-appearing (chronically)  HENT:      Head: Normocephalic and atraumatic  Eyes:      General:         Right eye: No discharge  Left eye: No discharge  Cardiovascular:      Rate and Rhythm: Normal rate and regular rhythm  Heart sounds: Murmur heard  Pulmonary:      Effort: Pulmonary effort is normal  No respiratory distress  Breath sounds: No wheezing or rales  Comments: Decreased breath sounds bilaterally  Abdominal:      General: Bowel sounds are normal  There is no distension  Palpations: Abdomen is soft  Tenderness: There is no abdominal tenderness  Musculoskeletal:      Right lower leg: Edema present  Left lower leg: Edema present  Neurological:      Mental Status: He is alert and oriented to person, place, and time  Additional Data:     Labs:  Results from last 7 days   Lab Units 12/26/22  0528 12/25/22  0643 12/24/22  1248   WBC Thousand/uL 10 95*   < > 11 00*   HEMOGLOBIN g/dL 8 1*   < > 8 9*   HEMATOCRIT % 27 7*   < > 30 5*   PLATELETS Thousands/uL 205   < > 204   NEUTROS PCT %  --   --  89*   LYMPHS PCT %  --   --  4*   MONOS PCT %  --   --  6   EOS PCT %  --   --  0    < > = values in this interval not displayed  Results from last 7 days   Lab Units 12/27/22  1015 12/25/22  0643 12/24/22  1248   SODIUM mmol/L 138   < > 138   POTASSIUM mmol/L 3 4*   < > 4 4   CHLORIDE mmol/L 98   < > 101   CO2 mmol/L 34*   < > 34*   BUN mg/dL 27*   < > 19   CREATININE mg/dL 1 45*   < > 1 13   ANION GAP mmol/L 6   < > 3*   CALCIUM mg/dL 8 2*   < > 8 4   ALBUMIN g/dL  --   --  2 1*   TOTAL BILIRUBIN mg/dL  --   --  0 72   ALK PHOS U/L  --   --  96   ALT U/L  --   --  12   AST U/L  --   --  31   GLUCOSE RANDOM mg/dL 159*   < > 131    < > = values in this interval not displayed       Results from last 7 days   Lab Units 12/24/22  1248   INR  2 23*             Results from last 7 days   Lab Units 12/24/22  1248   LACTIC ACID mmol/L 1 5 Lines/Drains:  Invasive Devices     Central Venous Catheter Line  Duration           CVC Central Lines 12/12/22 Single Right Subclavian 14 days                Central Line:  Goal for removal: N/A - Discharging with PICC for IV ABX/medications             Imaging: No pertinent imaging reviewed  Recent Cultures (last 7 days):   Results from last 7 days   Lab Units 12/24/22  1358 12/24/22  1343   BLOOD CULTURE  No Growth at 48 hrs  No Growth at 48 hrs         Last 24 Hours Medication List:   Current Facility-Administered Medications   Medication Dose Route Frequency Provider Last Rate   • acetaminophen  650 mg Oral Q6H [facility] Revjonesar, DO     • acetylcysteine  3 mL Nebulization TID [facility] Revjonesar, DO     • allopurinol  200 mg Oral Daily [facility] Revjonesar, DO     • atorvastatin  40 mg Oral Daily With [place] Revankar, DO     • ceFAZolin  2,000 mg Intravenous Q8H [facility] Rosiear, DO Stopped (12/27/22 1038)   • docusate sodium  100 mg Oral BID [facility] Rosiear, DO     • DULoxetine  60 mg Oral BID [facility] Rosiear, DO     • finasteride  5 mg Oral Daily [facility] Revankar, DO     • Fluticasone Furoate-Vilanterol  1 puff Inhalation Daily NAVA Coleman     • furosemide  40 mg Intravenous BID (diuretic) Lynn Velezar, DO     • gabapentin  100 mg Oral HS [facility] Rosiear, DO     • levalbuterol  1 25 mg Nebulization TID [facility] Rosiear, DO     • lidocaine  1 patch Topical Daily [facility] Rosiear, DO     • methocarbamol  500 mg Oral Q6H PRN Lynnalona Velezar, DO     • nicotine  1 patch Transdermal Daily [facility] Rosiear, DO     • oseltamivir  75 mg Oral Q12H [facility] Rosiear, DO     • pantoprazole  40 mg Oral Early Morning Lynn Rosiear, DO     • polyethylene glycol  17 g Oral Daily PRN Lynnalona Velezar, DO     • [START ON 12/29/2022] predniSONE  10 mg Oral Daily Lynn Revankar, DO     • predniSONE  20 mg Oral Daily [facility] Revankar, DO     • rivaroxaban  15 mg Oral Daily With Breakfast Lynn Montano, DO     • saccharomyces boulardii  250 mg Oral BID Lynn Revankar, DO     • senna  8 6 mg Oral HS Lynn Revankar, DO     • sodium chloride  3 mL Nebulization TID Lynn Revankar, DO     • umeclidinium  1 puff Inhalation Daily NAVA Coleman          Today, Patient Was Seen By: Celio Acosta DO    **Please Note: This note may have been constructed using a voice recognition system  **

## 2022-12-27 NOTE — ASSESSMENT & PLAN NOTE
On recent admission noted to have MSSA bacteremia  · Cont IV Ancef 2 g every 8 hours for total of 6 weeks  · Patient underwent TTE and MARY on prior admission

## 2022-12-27 NOTE — CASE MANAGEMENT
Case Management Assessment & Discharge Planning Note    Patient name Jayce Poster  [facility] [place] [facility] MRN [id number]  : 1940 Date 2022       Current Admission Date: 2022  Current Admission Alondra Guidry   Patient Active Problem List    Diagnosis Date Noted   • Influenza 2022   • Anemia 2022   • Depression with anxiety 2022   • MSSA bacteremia 2022   • Acute encephalopathy 2022   • Abnormal CAT scan 2022   • Elevated troponin 2022   • Left hip pain 2022   • Cervical strain 2022   • Lump of skin of left lower extremity 2022   • Primary osteoarthritis of both knees 2022   • Depression 2022   • Generalized weakness 2022   • Valvular heart disease 2022   • Pressure injury of right buttock, unstageable ([facility] ) 2022   • Ambulatory dysfunction 2022   • Dysphagia, pharyngoesophageal phase 2022   • Gastro-esophageal reflux disease without esophagitis 2022   • Generalized muscle weakness 2022   • History of falling 2022   • Iron deficiency anemia 2022   • Major depressive disorder, recurrent, unspecified ([facility] ) 2022   • Moderate to severe aortic stenosis 2022   • Other abnormalities of gait and mobility 2022   • Other specified polyneuropathies 2022   • Pulmonary hypertension due to left heart disease ([facility] ) 2022   • Presence of coronary angioplasty implant and graft 2022   • Unspecified hearing loss, unspecified ear 2022   • Chronic pain disorder 2022   • Fall 2022   • Closed fracture of right femur ([facility] ) 2022   • COPD (chronic obstructive pulmonary disease) ([facility] ) 2021   • Chronic diastolic heart failure ([facility] ) 2021   • Memory difficulty 2021   • Neuropathy 2021   • Coronary artery arteriosclerosis 2021   • Essential hypertension 2021   • Dyspnea on exertion    • Idiopathic hematuria 04/01/2021   • Kidney stone on left side 04/01/2021   • Flank pain 04/01/2021   • Obesity (BMI 30-39 9) 04/01/2021   • Chronic constipation 02/16/2021   • Vitamin D insufficiency 11/13/2020   • Symptomatic anemia 09/13/2020   • Former smoker 01/13/2020   • Nephrolithiasis 10/30/2019   • Bilateral leg edema 09/26/2019   • Hyperuricemia 07/22/2019   • Mixed simple and mucopurulent chronic bronchitis ([facility] ) 07/17/2019   • CAD (coronary artery disease) 05/13/2019   • Status post primary angioplasty with coronary stent 05/13/2019   • Stage 3a chronic kidney disease ([facility] ) 04/18/2019   • Hyponatremia 04/08/2019   • Serum total bilirubin elevated 04/08/2019   • Peripheral arteriosclerosis ([facility] ) 01/03/2019   • Pain in both lower extremities 01/31/2018   • Benign hypertension with chronic kidney disease, stage III ([facility] ) 06/27/2017   • JOO (generalized anxiety disorder) 06/07/2016   • Chronic pain syndrome 03/17/2016   • Bilateral lumbar radiculopathy 03/17/2016   • Lumbar canal stenosis 03/17/2016   • Difficulty in walking 09/11/2015   • Persistent proteinuria 01/08/2015   • Urinary incontinence 10/30/2014   • Aneurysm of abdominal aorta 04/07/2014   • Centrilobular emphysema ([facility] ) 04/07/2014   • Deep venous insufficiency 04/07/2014   • Hyperlipidemia 04/07/2014   • Pacemaker 04/07/2014   • Fibromyalgia 08/08/2013   • 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/2013   • Fecal soiling 03/26/2013   • Class 2 obesity due to excess calories without serious comorbidity with body mass index (BMI) of 36 0 to 36 9 in adult 03/26/2013   • Chronic a-fib ([facility] ) 01/23/2013   • Allergic rhinitis 10/01/2012   • Benign prostatic hyperplasia 09/04/2012   • Carpal tunnel syndrome 09/04/2012   • Moderate or severe vision impairment, one eye 09/04/2012   • Smoker 09/04/2012   • JOVI (obstructive sleep apnea) 09/04/2012   • Venous insufficiency (chronic) (peripheral) 09/04/2012      LOS (days): 2  Geometric Mean LOS (GMLOS) (days): [facility] to [facility]:     OBJECTIVE:  PATIENT READMITTED TO HOSPITAL  Risk of Unplanned Readmission Score: 45 52     Current admission status: Inpatient    Preferred Pharmacy:   [facility] [address] Jace Rucker, [phone] [facility] [address] [facility] [address],  [address] [address]  Phone: [phone] Fax: [phone]    [facility] [address] [facility], [address] [facility] [address] [facility] [address]  [address]  [address] [facility]  Phone: [phone] Fax: Axeldaisy, Dillon Chris Del Remedio  [address] [facility] [address] [facility]  Phone: [phone] Fax: [phone]    [facility] [address] [address]  Phone: [phone] Fax: [phone]    Primary Care Provider: Ann Marie Jaimes DO    Primary Insurance: MEDICARE  Secondary Insurance: COMMERCIAL MISCELLANEOUS    ASSESSMENT:  [address] [facility] [address] [address], [address] - Daughter   Primary Phone: [phone] (Mobile)               Advance Directives  Does patient have a [address]?: Yes  Does patient have Advance Directives?: Yes  Advance Directives: Power of  for health care  Primary Contact: Mandi Weston    Readmission Root Cause  30 Day Readmission: Yes  Who directed you to return to the hospital?: Family  Did you understand whom to contact if you had questions or problems?: Yes  Did you get your prescriptions before you left the hospital?: No  Reason[de-identified]  (went to [facility] facility)  Were you able to get your prescriptions filled when you left the hospital?: Yes  Did you take your medications as prescribed?: Yes  Were you able to get to your follow-up appointments?: No  Reason[de-identified] Readmitted prior to appointment (went to [address] [place] [address])  During previous admission, was a post-acute recommendation made?: Yes  What post-acute resources were offered?: STR (placed at [facility] for [employer])  Patient was readmitted due to: Influenza  Action Plan: medical management    Patient Information  Admitted from[de-identified] Facility [facility]  Mental Status: Alert  During Assessment patient was accompanied by: Spouse  Assessment information provided by[de-identified] Patient  Primary Caregiver: Self  Support Systems: Self, Spouse/significant other, Daughter  [place] of Residence: [address] [place] [address] do you [place] [address] entry access options   Select all that apply : Stairs  Number of steps to enter home : 2  Do the steps have railings?: Yes  Type of Current Residence: 2 story home  Upon entering residence, is there a bedroom on the main floor (no further steps)?: Yes  Upon entering residence, is there a bathroom on the main floor (no further steps)?: Yes  In the last 12 months, was there a time when you were not able to pay the mortgage or rent on time?: No  In the last 12 months, how many places have you lived?: 1  In the last 12 months, was there a time when you did not have a steady place to sleep or slept in a shelter (including now)?: No  Homeless/housing insecurity resource given?: N/A  Living Arrangements: Lives w/ Spouse/significant other, Lives w/ Daughter    Activities of Daily Living Prior to Admission  Functional Status: Assistance  Completes ADLs independently?: No  Level of ADL dependence: Assistance  Ambulates independently?: No  Level of ambulatory dependence: Assistance  Does patient use assisted devices?: Yes  Assisted Devices (DME) used: Bedside Commode, Walker, Wheelchair  Does patient currently own DME?: Yes  What DME does the patient currently own?: Bedside Commode, Cornelious Gaw, Wheelchair  Does patient have a history of Outpatient Therapy (PT/OT)?: No  Does the patient have a history of Short-Term Rehab?: Yes [facility] most recently, [place] in past)  Does patient have a history of HHC?: Yes  Does patient currently have Carol Ann ?: No    Patient Information Continued  Income Source: Pension/alf  Does patient have prescription coverage?: Yes ([employer]/mail order)  Within the past 12 months, you worried that your food would run out before you got the money to buy more : Never true  Within the past 12 months, the food you bought just didn't last and you didn't have money to get more : Never true  Food insecurity resource given?: N/A  Does patient receive dialysis treatments?: No  Does patient have a history of substance abuse?: No  Does patient have a history of Mental Health Diagnosis?: No    Means of Transportation  Means of Transport to Appts[de-identified] Family transport  In the past 12 months, has lack of transportation kept you from medical appointments or from getting medications?: No  In the past 12 months, has lack of transportation kept you from meetings, work, or from getting things needed for daily living?: No  Was application for public transport provided?: N/A      DISCHARGE DETAILS:    Discharge planning discussed with[de-identified] Patient and wife, Nick Yoli of Choice: Yes  Comments - Freedom of Choice: SW met with pt and wife to assess needs and discuss plans  Pt was re-admitted from [facility] where he had recently been transferred to for STR placement  Pt is declining return to rehab facility  Pt's preference is to return home with home infusion and home care services  SW offered to make referrals to infusion companies and local home care agencies to determine coverage and availability  Pt agreeable with referral process    CM contacted family/caregiver?: Yes  Were Treatment Team discharge recommendations reviewed with patient/caregiver?: Yes  Did patient/caregiver verbalize understanding of patient care needs?: Yes  Were patient/caregiver advised of the risks associated with not following Treatment Team discharge recommendations?: Yes    Contacts  Patient Contacts: Ely Salazar  Relationship to Patient[de-identified] Family (wife/daughter)  Contact Method: In Person, Phone  Phone Number: [phone]  Reason/Outcome: Discharge [address]         Is the patient interested in [facility] at discharge?: Yes  [address] requested[de-identified] Nursing, Physical Therapy, Occupational [address] Name[de-identified] [address] Provider[de-identified] PCP  Home Health Services Needed[de-identified] [facility], Evaluate Functional Status and Safety, Gait/ADL Training, Strengthening/Theraputic Exercises to Improve Function  Homebound Criteria Met[de-identified] Uses an Assist Device (i e  cane, walker, etc), Requires the Assistance of Another Person for Safe Ambulation or to Leave the Home  Supporting Clincal Findings[de-identified] Limited Endurance, Fatigues Easliy in [employer]    DME Referral Provided  Referral made for DME?: No    Other Referral/Resources/Interventions Provided:  Interventions: Home Infusion, C  Referral Comments: Referrals made to home infusion/IV antibiotics and home care services  Treatment Team Recommendation: Home with 2003 UUSEE (and home infusion)  Discharge Destination Plan[de-identified] Home with 2003 UUSEE (and home infusion)  Transport at Discharge : Family    IMM Given (Date):: 12/27/22  IMM Given to[de-identified] Patient (Initial IMM reviewed with pt  Pt verbalized understanding  Pt signed IMM and copy given    Copy also placed in scan bin for chart )

## 2022-12-27 NOTE — ASSESSMENT & PLAN NOTE
Hemoglobin stable  Repeat lab work in a m      Results from last 7 days   Lab Units 12/26/22  0528 12/25/22  0643 12/24/22  1248   HEMOGLOBIN g/dL 8 1* 8 4* 8 9*   HEMATOCRIT % 27 7* 28 7* 30 5*   MCV fL 90 90 91

## 2022-12-27 NOTE — ASSESSMENT & PLAN NOTE
On recent admission noted to have MSSA bacteremia  · Cont IV Ancef 2 g every 8 hours for total of 6 weeks  · Patient underwent TTE and MARY on prior admission  · ID consulted as patient is now going home with home infusions and this will need to be set up

## 2022-12-27 NOTE — ASSESSMENT & PLAN NOTE
Received 125 mg of IV Solu-Medrol in the ER  On exam on wheezing  · Continue prednisone taper as was recommended on prior admission

## 2022-12-27 NOTE — ASSESSMENT & PLAN NOTE
Hemoglobin overall stable    Results from last 7 days   Lab Units 12/26/22  0528 12/25/22  0643 12/24/22  1248 12/20/22  0509   HEMOGLOBIN g/dL 8 1* 8 4* 8 9* 8 3*   HEMATOCRIT % 27 7* 28 7* 30 5* 27 9*   MCV fL 90 90 91  --

## 2022-12-27 NOTE — PROGRESS NOTES
Progress Note - Cardiology   [facility]     Sho Merrill 80 y o  male MRN: [id number]  : 1940  Unit/Bed#: 2 [place] [address] Encounter: [id number]    Assessment and Plan:   1  Influenza A: On Tamiflu per primary team    2  Chronic diastolic heart failure: Unfortunately patient continues to retain fluid secondary to severe aortic stenosis    -   Continue Lasix 40 mg IV twice daily    -   Consider adding Aldactone 25 mg daily    -   Unable to use ACE/ARB/hydralazine or nitrates secondary to severe aortic stenosis and dropping patient's afterload    -   Monitor I&O, daily weights and labs    -   Low-sodium diet    3  Severe to critical aortic stenosis: Echocardiogram demonstrated severely reduced mobility of the aortic valve with severe stenosis with mean gradient of 40 mmHg, peak gradient of 72 mmHg and dimensionless index of 0 28 this is consistent with a valve area of approximately 0 9 cm²    -   We have discussed numerous times with patient that he needs to CT surgery for consideration for valve replacement  -   This will need to be done once his bacteremia has resolved    4  MSSA bacteremia: Patient on IV Ancef per infectious disease    -   MARY performed during previous admission did not demonstrate vegetation on pacemaker lead or on the valves    5  COPD    6  Hypertension: Blood pressure stable after aggressive diuresis    -       7  Chronic kidney disease stage III: Baseline creatinine appears to be 1 3-1 6    -   May need to accept higher creatinine to keep patient euvolemic    8  Chronic anemia    9  Chronic atrial fibrillation: Patient has [address] pacemaker    -   Continue Xarelto 15 mg daily    Subjective / Objective:   Patient seen and examined  No complaints offered at this time  Telemetry stable  We will continue to diurese with IV Lasix as long as creatinine stays stable      Vitals: Blood pressure 122/71, pulse 71, temperature (!) 97 3 °F (36 3 °C), temperature source Oral, resp  rate 20, height 6' 2" (1 88 m), weight 118 kg (260 lb 2 3 oz), SpO2 96 %  Vitals:    12/25/22 0600 12/27/22 0600   Weight: 120 kg (265 lb 6 9 oz) 118 kg (260 lb 2 3 oz)     Body mass index is 33 4 kg/m²  BP Readings from Last 3 Encounters:   12/27/22 122/71   12/20/22 144/72   11/22/22 119/64     Orthostatic Blood Pressures    Flowsheet Row Most Recent Value   Blood Pressure 122/71 filed at 12/27/2022 0828   Patient Position - Orthostatic VS Sitting filed at 12/27/2022 0828        I/O       12/25 0701 12/26 0700 12/26 0701  12/27 0700 12/27 0701  12/28 0700    P  O    260    I V  (mL/kg)  10 (0 1) 10 (0 1)    IV Piggyback   50    Total Intake(mL/kg)  10 (0 1) 320 (2 7)    Urine (mL/kg/hr) 200 (0 1) 1118 (0 4) 300 (0 6)    Stool       Total Output 200 1118 300    Net -200 -1108 +20           Unmeasured Urine Occurrence  2 x         Invasive Devices     Central Venous Catheter Line  Duration           CVC Central Lines 12/12/22 Single Right Subclavian 14 days                  Intake/Output Summary (Last 24 hours) at 12/27/2022 1128  Last data filed at 12/27/2022 1038  Gross per 24 hour   Intake 320 ml   Output 1218 ml   Net -898 ml         Physical Exam:   Physical Exam  Vitals and nursing note reviewed  Constitutional:       Appearance: Normal appearance  He is morbidly obese  HENT:      Right Ear: External ear normal       Left Ear: External ear normal       Nose: Nose normal    Eyes:      General: No scleral icterus  Right eye: No discharge  Left eye: No discharge  Cardiovascular:      Rate and Rhythm: Normal rate  Pulses: Normal pulses  Heart sounds: Murmur heard  Systolic murmur is present with a grade of 3/6  Pulmonary:      Effort: Pulmonary effort is normal  No respiratory distress  Breath sounds: Normal breath sounds  No wheezing, rhonchi or rales  Abdominal:      General: Bowel sounds are normal  There is no distension  Palpations: Abdomen is soft  Musculoskeletal:      Right lower le+ Pitting Edema present  Left lower le+ Pitting Edema present  Skin:     General: Skin is warm and dry  Capillary Refill: Capillary refill takes less than 2 seconds  Neurological:      General: No focal deficit present  Mental Status: He is alert  Mental status is at baseline  Psychiatric:         Mood and Affect: Mood normal          Behavior: Behavior is cooperative              Medications/ Allergies:     Current Facility-Administered Medications   Medication Dose Route Frequency Provider Last Rate   • acetaminophen  650 mg Oral Q6H [address] Lynn Revankar, DO     • acetylcysteine  3 mL Nebulization TID Lynn Revankar, DO     • allopurinol  200 mg Oral Daily Lynn Revankar, DO     • atorvastatin  40 mg Oral Daily With Friendly-Santosh Revankar, DO     • ceFAZolin  2,000 mg Intravenous Q8H Lynn Revankar, DO Stopped (22 1038)   • docusate sodium  100 mg Oral BID Lynn Revankar, DO     • DULoxetine  60 mg Oral BID Lynn Revankar, DO     • finasteride  5 mg Oral Daily Lynn Revankar, DO     • Fluticasone Furoate-Vilanterol  1 puff Inhalation Daily NAVA Coleman     • furosemide  40 mg Intravenous BID (diuretic) Lynn Revankar, DO     • gabapentin  100 mg Oral HS Lynn Revankar, DO     • levalbuterol  1 25 mg Nebulization TID Lynn Revankar, DO     • lidocaine  1 patch Topical Daily Lynn Revankar, DO     • methocarbamol  500 mg Oral Q6H PRN Lynn Revankar, DO     • nicotine  1 patch Transdermal Daily Lynn Revankar, DO     • oseltamivir  75 mg Oral Q12H [address] Lynn Revankar, DO     • pantoprazole  40 mg Oral Early Morning Lynn Revankar, DO     • polyethylene glycol  17 g Oral Daily PRN Lynn Revjonesar, DO     • [START ON 2022] predniSONE  10 mg Oral Daily Lynn Revankar, DO     • predniSONE  20 mg Oral Daily Lynn Revankar, DO     • rivaroxaban  15 mg Oral Daily With Breakfast Lynn Revankar, DO • saccharomyces boulardii  250 mg Oral BID Lynn Revankar, DO     • senna  8 6 mg Oral HS Lynn Revankar, DO     • sodium chloride  3 mL Nebulization TID Lynn Revankar, DO     • umeclidinium  1 puff Inhalation Daily NAVA Coleman       methocarbamol, 500 mg, Q6H PRN  polyethylene glycol, 17 g, Daily PRN      No Known Allergies    VTE Pharmacologic Prophylaxis:   Sequential compression device (Venodyne)     Labs:   Troponins:  Results from last 7 days   Lab Units 12/24/22  1724 12/24/22  1458   HSTNI D2 ng/L  --  -7   HSTNI D4 ng/L -7  --      CBC with diff:  Results from last 7 days   Lab Units 12/26/22  0528 12/25/22  0643 12/24/22  1248   WBC Thousand/uL 10 95* 9 62 11 00*   HEMOGLOBIN g/dL 8 1* 8 4* 8 9*   HEMATOCRIT % 27 7* 28 7* 30 5*   MCV fL 90 90 91   PLATELETS Thousands/uL 205 208 204   MCH pg 26 2* 26 3* 26 6*   MCHC g/dL 29 2* 29 3* 29 2*   RDW % 20 4* 20 3* 20 3*   MPV fL 9 1 9 3 8 8*   NRBC AUTO /100 WBCs  --   --  0     CMP:  Results from last 7 days   Lab Units 12/27/22  1015 12/26/22  0528 12/25/22  0643 12/24/22  1248   SODIUM mmol/L 138 138 137 138   POTASSIUM mmol/L 3 4* 3 9 4 3 4 4   CHLORIDE mmol/L 98 100 99 101   CO2 mmol/L 34* 33* 32 34*   ANION GAP mmol/L 6 5 6 3*   BUN mg/dL 27* 27* 27* 19   CREATININE mg/dL 1 45* 1 45* 1 40* 1 13   CALCIUM mg/dL 8 2* 8 5 8 5 8 4   AST U/L  --   --   --  31   ALT U/L  --   --   --  12   ALK PHOS U/L  --   --   --  96   TOTAL PROTEIN g/dL  --   --   --  7 2   ALBUMIN g/dL  --   --   --  2 1*   TOTAL BILIRUBIN mg/dL  --   --   --  0 72   EGFR ml/min/1 73sq m 44 44 46 60     Magnesium:  Results from last 7 days   Lab Units 12/25/22  0643 12/24/22  1248   MAGNESIUM mg/dL 2 2 2 2     Coags:  Results from last 7 days   Lab Units 12/24/22  1248   PTT seconds 40*   INR  2 23*     NT-proBNP:   Recent Labs     12/24/22  1248   NTBNP 6,479*        Imaging & Testing   I have personally reviewed pertinent reports      XR chest 1 view portable    Result Date: 12/24/2022  Narrative: CHEST INDICATION:   shortness of breath  COMPARISON:  Comparison is made to prior studies, most recent is dated December 15, 2022  EXAM PERFORMED/VIEWS:  XR CHEST PORTABLE FINDINGS:  Right PICC with its tip near the cavoatrial junction  Left subclavian intracardiac device unchanged  Cardiomegaly  Prominence of the vascular markings compatible with pulmonary venous congestion and prominence of the interstitium likely due to interstitial pulmonary edema  Small right and possible left pleural effusion with bibasilar atelectasis/consolidation  Osseous structures appear within normal limits for patient age  Impression: Cardiomegaly and pulmonary vascular congestion with interstitial pulmonary edema  Small right and probable smaller left pleural effusion with bibasilar atelectasis /consolidation  Workstation performed: [id number]     [facility]  Cardiology       "This note was completed in part utilizing m-modal fluency direct voice recognition software  Grammatical errors, random word insertion, spelling mistakes, and incomplete sentences may be an occasional consequence of the system secondary to software limitations, ambient noise and hardware issues  Please read the chart carefully and recognize, using context, where substitutions have occurred    If you have any questions or concerns about the context, text or information contained within the body of this dictation, please contact myself, the provider, for further clarification "

## 2022-12-27 NOTE — CASE MANAGEMENT
Case Management Discharge Planning Note    Patient name Magdiel Palma  Location [address] MRN [id number]  : 1940 Date 2022       Current Admission Date: 2022  Current Admission Alondra Guidry   Patient Active Problem List    Diagnosis Date Noted   • Influenza 2022   • Anemia 2022   • Depression with anxiety 2022   • MSSA bacteremia 2022   • Acute encephalopathy 2022   • Abnormal CAT scan 2022   • Elevated troponin 2022   • Left hip pain 2022   • Cervical strain 2022   • Lump of skin of left lower extremity 2022   • Primary osteoarthritis of both knees 2022   • Depression 2022   • Generalized weakness 2022   • Valvular heart disease 2022   • Pressure injury of right buttock, unstageable ([facility] ) 2022   • Ambulatory dysfunction 2022   • Dysphagia, pharyngoesophageal phase 2022   • Gastro-esophageal reflux disease without esophagitis 2022   • Generalized muscle weakness 2022   • History of falling 2022   • Iron deficiency anemia 2022   • Major depressive disorder, recurrent, unspecified ([facility] ) 2022   • Moderate to severe aortic stenosis 2022   • Other abnormalities of gait and mobility 2022   • Other specified polyneuropathies 2022   • Pulmonary hypertension due to left heart disease ([facility] ) 2022   • Presence of coronary angioplasty implant and graft 2022   • Unspecified hearing loss, unspecified ear 2022   • Chronic pain disorder 2022   • Fall 2022   • Closed fracture of right femur ([facility] ) 2022   • COPD (chronic obstructive pulmonary disease) ([facility] ) 2021   • Chronic diastolic heart failure ([facility] ) 2021   • Memory difficulty 2021   • Neuropathy 2021   • Coronary artery arteriosclerosis 2021   • Essential hypertension 2021   • Dyspnea on exertion    • Idiopathic hematuria 04/01/2021   • Kidney stone on left side 04/01/2021   • Flank pain 04/01/2021   • Obesity (BMI 30-39 9) 04/01/2021   • Chronic constipation 02/16/2021   • Vitamin D insufficiency 11/13/2020   • Symptomatic anemia 09/13/2020   • Former smoker 01/13/2020   • Nephrolithiasis 10/30/2019   • Bilateral leg edema 09/26/2019   • Hyperuricemia 07/22/2019   • Mixed simple and mucopurulent chronic bronchitis ([facility] ) 07/17/2019   • CAD (coronary artery disease) 05/13/2019   • Status post primary angioplasty with coronary stent 05/13/2019   • Stage 3a chronic kidney disease ([facility] ) 04/18/2019   • Hyponatremia 04/08/2019   • Serum total bilirubin elevated 04/08/2019   • Peripheral arteriosclerosis ([place] [address] ) 01/03/2019   • Pain in both lower extremities 01/31/2018   • Benign hypertension with chronic kidney disease, stage III ([facility]  ) 06/27/2017   • JOO (generalized anxiety disorder) 06/07/2016   • Chronic pain syndrome 03/17/2016   • Bilateral lumbar radiculopathy 03/17/2016   • Lumbar canal stenosis 03/17/2016   • Difficulty in walking 09/11/2015   • Persistent proteinuria 01/08/2015   • Urinary incontinence 10/30/2014   • Aneurysm of abdominal aorta 04/07/2014   • Centrilobular emphysema ([facility] ) 04/07/2014   • Deep venous insufficiency 04/07/2014   • Hyperlipidemia 04/07/2014   • Pacemaker 04/07/2014   • Fibromyalgia 08/08/2013   • 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/2013   • Fecal soiling 03/26/2013   • Class 2 obesity due to excess calories without serious comorbidity with body mass index (BMI) of 36 0 to 36 9 in adult 03/26/2013   • Chronic a-fib ([facility] ) 01/23/2013   • Allergic rhinitis 10/01/2012   • Benign prostatic hyperplasia 09/04/2012   • Carpal tunnel syndrome 09/04/2012   • Moderate or severe vision impairment, one eye 09/04/2012   • Smoker 09/04/2012   • JOVI (obstructive sleep apnea) 09/04/2012   • Venous insufficiency (chronic) (peripheral) 09/04/2012      LOS (days): 2  Geometric Mean LOS (GMLOS) (days):   Days to GMLOS: OBJECTIVE:  Risk of Unplanned Readmission Score: 42 34     Current admission status: Inpatient   Preferred Pharmacy:   [facility] [id number] Malissa Rothman, [address] [facility] [place] [address], [address] [id number]  Phone: [phone] Fax: [phone]    [facility] [address] [facility], [address] [facility] [address]  [address] [address]  [address] [facility]  Phone: [phone] Fax: [place], [address]  [address] [facility] [address] [facility]  Phone: [phone] Fax: [phone]    [facility] [address] [place] [address]  Phone: [phone] Fax: [phone]    Primary Care Provider: Eyal Russell DO    Primary Insurance: MEDICARE  Secondary Insurance: COMMERCIAL MISCELLANEOUS    DISCHARGE DETAILS:    Discharge planning discussed with[de-identified] Patient and daughter, Asif Toromacass of Choice: Yes  Comments - Freedom of Choice: SW following to assist with DCP  Current plan is for pt to return home with family with IV infusion and home care services  SW received response from one infusion company, [employer]jammie Joyce  Per [employer] pt's copay for medication and supplies would be $253 06 per week and treatment is scheduled through 1/17/23  PAULA reviewed copay amount with pt  PAULA also called daughters and was able to speak to daughter, Gil Tracy  Reviewed plan and copay amount with Gil Tracy  Per Gil Tracy she and her sister are assisting with the cost of treatment so Gil Tracy is agreeable with covering cost quoted by [employer]  PAULA will contact Brian Joyce and [employer] to coordinate delivery and start of care  Per [employer] they would want to send nurse out to teach afternoon during afternoon dose  Considering that it seems pt will be able to discharge after morning dose on Thursday, 12/29/22    Gil Tracy also aware of potential discharge timeframe  SW will continue to follow to coordinate services and assist with planning    CM contacted family/caregiver?: Yes  Were Treatment Team discharge recommendations reviewed with patient/caregiver?: Yes  Did patient/caregiver verbalize understanding of patient care needs?: Yes  Were patient/caregiver advised of the risks associated with not following Treatment Team discharge recommendations?: Yes    Contacts  Patient Contacts: Tor Dupont  Relationship to Patient[de-identified] Family (daughter)  Contact Method: Phone  Phone Number: [phone]  Reason/Outcome: Discharge Planning, Continuity of [address] [facility]         [facility] Yes    Other Referral/Resources/Interventions Provided:  Interventions: HHC, Home Infusion  Referral Comments: See above    Treatment Team Recommendation: Short Term Rehab  Discharge Destination Plan[de-identified] Home with [address] [facility] (and home infusion)  Transport at Discharge : Family

## 2022-12-27 NOTE — PLAN OF CARE
Problem: MOBILITY - ADULT  Goal: Maintain or return to baseline ADL function  Description: INTERVENTIONS:  -  Assess patient's ability to carry out ADLs; assess patient's baseline for ADL function and identify physical deficits which impact ability to perform ADLs (bathing, care of mouth/teeth, toileting, grooming, dressing, etc )  - Assess/evaluate cause of self-care deficits   - Assess range of motion  - Assess patient's mobility; develop plan if impaired  - Assess patient's need for assistive devices and provide as appropriate  - Encourage maximum independence but intervene and supervise when necessary  - Involve family in performance of ADLs  - Assess for home care needs following discharge   - Consider OT consult to assist with ADL evaluation and planning for discharge  - Provide patient education as appropriate  Outcome: Progressing  Goal: Maintains/Returns to pre admission functional level  Description: INTERVENTIONS:  - Perform BMAT or MOVE assessment daily    - Set and communicate daily mobility goal to care team and patient/family/caregiver  - Collaborate with rehabilitation services on mobility goals if consulted  - Perform Range of Motion 3 times a day  - Reposition patient every 2 hours    - Dangle patient 3 times a day  - Stand patient 3 times a day  - Ambulate patient 3 times a day  - Out of bed to chair 3 times a day   - Out of bed for meals 3  Problem: PAIN - ADULT  Goal: Verbalizes/displays adequate comfort level or baseline comfort level  Description: Interventions:  - Encourage patient to monitor pain and request assistance  - Assess pain using appropriate pain scale  - Administer analgesics based on type and severity of pain and evaluate response  - Implement non-pharmacological measures as appropriate and evaluate response  - Consider cultural and social influences on pain and pain management  - Notify physician/advanced practitioner if interventions unsuccessful or patient reports new pain  Outcome: Progressing     Problem: INFECTION - ADULT  Goal: Absence or prevention of progression during hospitalization  Description: INTERVENTIONS:  - Assess and monitor for signs and symptoms of infection  - Monitor lab/diagnostic results  - Monitor all insertion sites, i e  indwelling lines, tubes, and drains  - Monitor endotracheal if appropriate and nasal secretions for changes in amount and color  - Darragh appropriate cooling/warming therapies per order  - Administer medications as ordered  - Instruct and encourage patient and family to use good hand hygiene technique  - Identify and instruct in appropriate isolation precautions for identified infection/condition  Outcome: Progressing  Goal: Absence of fever/infection during neutropenic period  Description: INTERVENTIONS:  - Monitor WBC    Outcome: Progressing     Problem: DISCHARGE PLANNING  Goal: Discharge to home or other facility with appropriate resources  Description: INTERVENTIONS:  - Identify barriers to discharge w/patient and caregiver  - Arrange for needed discharge resources and transportation as appropriate  - Identify discharge learning needs (meds, wound care, etc )  - Arrange for interpretive services to assist at discharge as needed  - Refer to Case Management Department for coordinating discharge planning if the patient needs post-hospital services based on physician/advanced practitioner order or complex needs related to functional status, cognitive ability, or social support system  Outcome: Progressing     Problem: Knowledge Deficit  Goal: Patient/family/caregiver demonstrates understanding of disease process, treatment plan, medications, and discharge instructions  Description: Complete learning assessment and assess knowledge base    Interventions:  - Provide teaching at level of understanding  - Provide teaching via preferred learning methods  Outcome: Progressing     Problem: RESPIRATORY - ADULT  Goal: Achieves optimal ventilation and oxygenation  Description: INTERVENTIONS:  - Assess for changes in respiratory status  - Assess for changes in mentation and behavior  - Position to facilitate oxygenation and minimize respiratory effort  - Oxygen administered by appropriate delivery if ordered  - Initiate smoking cessation education as indicated  - Encourage broncho-pulmonary hygiene including cough, deep breathe, Incentive Spirometry  - Assess the need for suctioning and aspirate as needed  - Assess and instruct to report SOB or any respiratory difficulty  - Respiratory Therapy support as indicated  Outcome: Progressing    times a day  - Out of bed for toileting  - Record patient progress and toleration of activity level   Outcome: Progressing

## 2022-12-27 NOTE — ASSESSMENT & PLAN NOTE
Lab Results   Component Value Date    EGFR 44 12/26/2022    EGFR 46 12/25/2022    EGFR 60 12/24/2022    CREATININE 1 45 (H) 12/26/2022    CREATININE 1 40 (H) 12/25/2022    CREATININE 1 13 12/24/2022     Baseline creatinine 1 3-1 6  Creatinine remains at baseline  Repeat labs in ramon marie

## 2022-12-27 NOTE — TELEPHONE ENCOUNTER
Patient wife calls office to inform us that he is in the hospital and she is not sure when he will be discharged   I informed her to keep us informed so that we have him scheduled for his hospital f/u

## 2022-12-27 NOTE — ASSESSMENT & PLAN NOTE
Wt Readings from Last 3 Encounters:   12/25/22 120 kg (265 lb 6 9 oz)   12/07/22 120 kg (265 lb)   11/22/22 121 kg (265 lb 10 5 oz)     Chest x-ray with pulmonary vascular congestion/pulmonary edema with bibasilar atelectasis  · Patient received a dose of IV Lasix in the ER  · IV lasix + albumin given 12/25 and place patient on 40 mg of IV Lasix twice daily  · Patient is to follow-up after discharge for possible aortic valve replacement  · Received IV Lasix and IV Bumex on prior admission

## 2022-12-27 NOTE — ASSESSMENT & PLAN NOTE
Wt Readings from Last 3 Encounters:   12/27/22 118 kg (260 lb 2 3 oz)   12/07/22 120 kg (265 lb)   11/22/22 121 kg (265 lb 10 5 oz)     Chest x-ray with pulmonary vascular congestion/pulmonary edema with bibasilar atelectasis  · Patient received a dose of IV Lasix in the ER  · IV lasix + albumin given 12/25    Continue 40 mg of IV Lasix twice daily  · Discussed with patient and family the importance of follow-up after discharge for possible aortic valve replacement  · Received IV Lasix and IV Bumex on prior admission

## 2022-12-28 LAB
ANION GAP SERPL CALCULATED.3IONS-SCNC: 4 MMOL/L (ref 4–13)
BUN SERPL-MCNC: 24 MG/DL (ref 5–25)
CALCIUM SERPL-MCNC: 8.4 MG/DL (ref 8.3–10.1)
CHLORIDE SERPL-SCNC: 99 MMOL/L (ref 96–108)
CO2 SERPL-SCNC: 34 MMOL/L (ref 21–32)
CREAT SERPL-MCNC: 1.39 MG/DL (ref 0.6–1.3)
ERYTHROCYTE [DISTWIDTH] IN BLOOD BY AUTOMATED COUNT: 20.3 % (ref 11.6–15.1)
GFR SERPL CREATININE-BSD FRML MDRD: 46 ML/MIN/1.73SQ M
GLUCOSE SERPL-MCNC: 129 MG/DL (ref 65–140)
HCT VFR BLD AUTO: 27.9 % (ref 36.5–49.3)
HGB BLD-MCNC: 8.2 G/DL (ref 12–17)
MCH RBC QN AUTO: 26.5 PG (ref 26.8–34.3)
MCHC RBC AUTO-ENTMCNC: 29.4 G/DL (ref 31.4–37.4)
MCV RBC AUTO: 90 FL (ref 82–98)
PLATELET # BLD AUTO: 168 THOUSANDS/UL (ref 149–390)
PMV BLD AUTO: 9.4 FL (ref 8.9–12.7)
POTASSIUM SERPL-SCNC: 3.6 MMOL/L (ref 3.5–5.3)
RBC # BLD AUTO: 3.1 MILLION/UL (ref 3.88–5.62)
SODIUM SERPL-SCNC: 137 MMOL/L (ref 135–147)
WBC # BLD AUTO: 8.4 THOUSAND/UL (ref 4.31–10.16)

## 2022-12-28 RX ORDER — POTASSIUM CHLORIDE 20 MEQ/1
40 TABLET, EXTENDED RELEASE ORAL ONCE
Status: COMPLETED | OUTPATIENT
Start: 2022-12-28 | End: 2022-12-28

## 2022-12-28 RX ORDER — ALPRAZOLAM 0.5 MG/1
0.5 TABLET ORAL ONCE
Status: COMPLETED | OUTPATIENT
Start: 2022-12-28 | End: 2022-12-28

## 2022-12-28 RX ORDER — TORSEMIDE 20 MG/1
40 TABLET ORAL
Status: DISCONTINUED | OUTPATIENT
Start: 2022-12-28 | End: 2022-12-29 | Stop reason: HOSPADM

## 2022-12-28 RX ADMIN — DOCUSATE SODIUM 100 MG: 100 CAPSULE, LIQUID FILLED ORAL at 17:57

## 2022-12-28 RX ADMIN — ATORVASTATIN CALCIUM 40 MG: 40 TABLET, FILM COATED ORAL at 16:34

## 2022-12-28 RX ADMIN — SENNOSIDES 8.6 MG: 8.6 TABLET, FILM COATED ORAL at 21:42

## 2022-12-28 RX ADMIN — CEFAZOLIN SODIUM 2000 MG: 2 SOLUTION INTRAVENOUS at 02:01

## 2022-12-28 RX ADMIN — FLUTICASONE FUROATE AND VILANTEROL TRIFENATATE 1 PUFF: 100; 25 POWDER RESPIRATORY (INHALATION) at 10:08

## 2022-12-28 RX ADMIN — LEVALBUTEROL HYDROCHLORIDE 1.25 MG: 1.25 SOLUTION, CONCENTRATE RESPIRATORY (INHALATION) at 19:05

## 2022-12-28 RX ADMIN — LEVALBUTEROL HYDROCHLORIDE 1.25 MG: 1.25 SOLUTION, CONCENTRATE RESPIRATORY (INHALATION) at 14:15

## 2022-12-28 RX ADMIN — FUROSEMIDE 40 MG: 10 INJECTION, SOLUTION INTRAMUSCULAR; INTRAVENOUS at 10:06

## 2022-12-28 RX ADMIN — DULOXETINE HYDROCHLORIDE 60 MG: 60 CAPSULE, DELAYED RELEASE ORAL at 10:04

## 2022-12-28 RX ADMIN — ALLOPURINOL 200 MG: 100 TABLET ORAL at 10:06

## 2022-12-28 RX ADMIN — CEFAZOLIN SODIUM 2000 MG: 2 SOLUTION INTRAVENOUS at 21:41

## 2022-12-28 RX ADMIN — ACETAMINOPHEN 650 MG: 325 TABLET, FILM COATED ORAL at 18:08

## 2022-12-28 RX ADMIN — ACETAMINOPHEN 650 MG: 325 TABLET, FILM COATED ORAL at 12:59

## 2022-12-28 RX ADMIN — POTASSIUM CHLORIDE 40 MEQ: 1500 TABLET, EXTENDED RELEASE ORAL at 12:59

## 2022-12-28 RX ADMIN — TORSEMIDE 40 MG: 20 TABLET ORAL at 16:33

## 2022-12-28 RX ADMIN — OSELTAMIVIR PHOSPHATE 75 MG: 75 CAPSULE ORAL at 21:42

## 2022-12-28 RX ADMIN — OSELTAMIVIR PHOSPHATE 75 MG: 75 CAPSULE ORAL at 10:06

## 2022-12-28 RX ADMIN — ACETYLCYSTEINE 600 MG: 200 SOLUTION ORAL; RESPIRATORY (INHALATION) at 14:16

## 2022-12-28 RX ADMIN — UMECLIDINIUM 1 PUFF: 62.5 AEROSOL, POWDER ORAL at 10:08

## 2022-12-28 RX ADMIN — RIVAROXABAN 15 MG: 15 TABLET, FILM COATED ORAL at 10:06

## 2022-12-28 RX ADMIN — PREDNISONE 20 MG: 20 TABLET ORAL at 10:05

## 2022-12-28 RX ADMIN — LIDOCAINE 5% 1 PATCH: 700 PATCH TOPICAL at 10:03

## 2022-12-28 RX ADMIN — PANTOPRAZOLE SODIUM 40 MG: 40 TABLET, DELAYED RELEASE ORAL at 05:37

## 2022-12-28 RX ADMIN — ISODIUM CHLORIDE 3 ML: 0.03 SOLUTION RESPIRATORY (INHALATION) at 14:16

## 2022-12-28 RX ADMIN — Medication 250 MG: at 17:57

## 2022-12-28 RX ADMIN — DOCUSATE SODIUM 100 MG: 100 CAPSULE, LIQUID FILLED ORAL at 10:07

## 2022-12-28 RX ADMIN — ISODIUM CHLORIDE 3 ML: 0.03 SOLUTION RESPIRATORY (INHALATION) at 19:05

## 2022-12-28 RX ADMIN — ISODIUM CHLORIDE 3 ML: 0.03 SOLUTION RESPIRATORY (INHALATION) at 07:03

## 2022-12-28 RX ADMIN — LEVALBUTEROL HYDROCHLORIDE 1.25 MG: 1.25 SOLUTION, CONCENTRATE RESPIRATORY (INHALATION) at 07:03

## 2022-12-28 RX ADMIN — GABAPENTIN 100 MG: 100 CAPSULE ORAL at 21:42

## 2022-12-28 RX ADMIN — ACETYLCYSTEINE 600 MG: 200 SOLUTION ORAL; RESPIRATORY (INHALATION) at 19:05

## 2022-12-28 RX ADMIN — ALPRAZOLAM 0.5 MG: 0.5 TABLET ORAL at 21:42

## 2022-12-28 RX ADMIN — DULOXETINE HYDROCHLORIDE 60 MG: 60 CAPSULE, DELAYED RELEASE ORAL at 17:57

## 2022-12-28 RX ADMIN — ACETYLCYSTEINE 600 MG: 200 SOLUTION ORAL; RESPIRATORY (INHALATION) at 07:03

## 2022-12-28 RX ADMIN — Medication 250 MG: at 10:04

## 2022-12-28 RX ADMIN — ACETAMINOPHEN 650 MG: 325 TABLET, FILM COATED ORAL at 00:17

## 2022-12-28 RX ADMIN — FINASTERIDE 5 MG: 5 TABLET, FILM COATED ORAL at 10:05

## 2022-12-28 RX ADMIN — ACETAMINOPHEN 650 MG: 325 TABLET, FILM COATED ORAL at 05:37

## 2022-12-28 RX ADMIN — CEFAZOLIN SODIUM 2000 MG: 2 SOLUTION INTRAVENOUS at 12:59

## 2022-12-28 NOTE — PLAN OF CARE
Problem: MOBILITY - ADULT  Goal: Maintain or return to baseline ADL function  Description: INTERVENTIONS:  -  Assess patient's ability to carry out ADLs; assess patient's baseline for ADL function and identify physical deficits which impact ability to perform ADLs (bathing, care of mouth/teeth, toileting, grooming, dressing, etc )  - Assess/evaluate cause of self-care deficits   - Assess range of motion  - Assess patient's mobility; develop plan if impaired  - Assess patient's need for assistive devices and provide as appropriate  - Encourage maximum independence but intervene and supervise when necessary  - Involve family in performance of ADLs  - Assess for home care needs following discharge   - Consider OT consult to assist with ADL evaluation and planning for discharge  - Provide patient education as appropriate  Outcome: Progressing  Goal: Maintains/Returns to pre admission functional level  Description: INTERVENTIONS:  - Perform BMAT or MOVE assessment daily    - Set and communicate daily mobility goal to care team and patient/family/caregiver     - Collaborate with rehabilitation services on mobility goals if consulted   - Out of bed for toileting  - Record patient progress and toleration of activity level   Outcome: Progressing     Problem: Potential for Falls  Goal: Patient will remain free of falls  Description: INTERVENTIONS:  - Educate patient/family on patient safety including physical limitations  - Instruct patient to call for assistance with activity   - Consult OT/PT to assist with strengthening/mobility   - Keep Call bell within reach  - Keep bed low and locked with side rails adjusted as appropriate  - Keep care items and personal belongings within reach  - Initiate and maintain comfort rounds  - Make Fall Risk Sign visible to staff  - Apply yellow socks and bracelet for high fall risk patients  - Consider moving patient to room near nurses station  Outcome: Progressing     Problem: PAIN - ADULT  Goal: Verbalizes/displays adequate comfort level or baseline comfort level  Description: Interventions:  - Encourage patient to monitor pain and request assistance  - Assess pain using appropriate pain scale  - Administer analgesics based on type and severity of pain and evaluate response  - Implement non-pharmacological measures as appropriate and evaluate response  - Consider cultural and social influences on pain and pain management  - Notify physician/advanced practitioner if interventions unsuccessful or patient reports new pain  Outcome: Progressing     Problem: INFECTION - ADULT  Goal: Absence or prevention of progression during hospitalization  Description: INTERVENTIONS:  - Assess and monitor for signs and symptoms of infection  - Monitor lab/diagnostic results  - Monitor all insertion sites, i e  indwelling lines, tubes, and drains  - Monitor endotracheal if appropriate and nasal secretions for changes in amount and color  - [place] appropriate cooling/warming therapies per order  - Administer medications as ordered  - Instruct and encourage patient and family to use good hand hygiene technique  - Identify and instruct in appropriate isolation precautions for identified infection/condition  Outcome: Progressing  Goal: Absence of fever/infection during neutropenic period  Description: INTERVENTIONS:  - Monitor WBC    Outcome: Progressing     Problem: SAFETY ADULT  Goal: Maintain or return to baseline ADL function  Description: INTERVENTIONS:  -  Assess patient's ability to carry out ADLs; assess patient's baseline for ADL function and identify physical deficits which impact ability to perform ADLs (bathing, care of mouth/teeth, toileting, grooming, dressing, etc )  - Assess/evaluate cause of self-care deficits   - Assess range of motion  - Assess patient's mobility; develop plan if impaired  - Assess patient's need for assistive devices and provide as appropriate  - Encourage maximum independence but intervene and supervise when necessary  - Involve family in performance of ADLs  - Assess for home care needs following discharge   - Consider OT consult to assist with ADL evaluation and planning for discharge  - Provide patient education as appropriate  Outcome: Progressing  Goal: Maintains/Returns to pre admission functional level  Description: INTERVENTIONS:  - Perform BMAT or MOVE assessment daily    - Set and communicate daily mobility goal to care team and patient/family/caregiver     - Collaborate with rehabilitation services on mobility goals if consulted  - Out of bed for toileting  - Record patient progress and toleration of activity level   Outcome: Progressing  Goal: Patient will remain free of falls  Description: INTERVENTIONS:  - Educate patient/family on patient safety including physical limitations  - Instruct patient to call for assistance with activity   - Consult OT/PT to assist with strengthening/mobility   - Keep Call bell within reach  - Keep bed low and locked with side rails adjusted as appropriate  - Keep care items and personal belongings within reach  - Initiate and maintain comfort rounds  - Make Fall Risk Sign visible to staff  - Apply yellow socks and bracelet for high fall risk patients  - Consider moving patient to room near nurses station  Outcome: Progressing     Problem: DISCHARGE PLANNING  Goal: Discharge to home or other facility with appropriate resources  Description: INTERVENTIONS:  - Identify barriers to discharge w/patient and caregiver  - Arrange for needed discharge resources and transportation as appropriate  - Identify discharge learning needs (meds, wound care, etc )  - Arrange for interpretive services to assist at discharge as needed  - Refer to Case Management Department for coordinating discharge planning if the patient needs post-hospital services based on physician/advanced practitioner order or complex needs related to functional status, cognitive ability, or social support system  Outcome: Progressing     Problem: Knowledge Deficit  Goal: Patient/family/caregiver demonstrates understanding of disease process, treatment plan, medications, and discharge instructions  Description: Complete learning assessment and assess knowledge base    Interventions:  - Provide teaching at level of understanding  - Provide teaching via preferred learning methods  Outcome: Progressing     Problem: RESPIRATORY - ADULT  Goal: Achieves optimal ventilation and oxygenation  Description: INTERVENTIONS:  - Assess for changes in respiratory status  - Assess for changes in mentation and behavior  - Position to facilitate oxygenation and minimize respiratory effort  - Oxygen administered by appropriate delivery if ordered  - Initiate smoking cessation education as indicated  - Encourage broncho-pulmonary hygiene including cough, deep breathe, Incentive Spirometry  - Assess the need for suctioning and aspirate as needed  - Assess and instruct to report SOB or any respiratory difficulty  - Respiratory Therapy support as indicated  Outcome: Progressing

## 2022-12-28 NOTE — CASE MANAGEMENT
Case Management Discharge Planning Note    Patient name Sarah Juares  Location [address] MRN [id number]  : 1940 Date 2022       Current Admission Date: 2022  Current Admission Alondra Guidry   Patient Active Problem List    Diagnosis Date Noted   • Influenza 2022   • Anemia 2022   • Depression with anxiety 2022   • MSSA bacteremia 2022   • Acute encephalopathy 2022   • Abnormal CAT scan 2022   • Elevated troponin 2022   • Left hip pain 2022   • Cervical strain 2022   • Lump of skin of left lower extremity 2022   • Primary osteoarthritis of both knees 2022   • Depression 2022   • Generalized weakness 2022   • Valvular heart disease 2022   • Pressure injury of right buttock, unstageable ([facility] ) 2022   • Ambulatory dysfunction 2022   • Dysphagia, pharyngoesophageal phase 2022   • Gastro-esophageal reflux disease without esophagitis 2022   • Generalized muscle weakness 2022   • History of falling 2022   • Iron deficiency anemia 2022   • Major depressive disorder, recurrent, unspecified ([facility] ) 2022   • Moderate to severe aortic stenosis 2022   • Other abnormalities of gait and mobility 2022   • Other specified polyneuropathies 2022   • Pulmonary hypertension due to left heart disease ([facility] ) 2022   • Presence of coronary angioplasty implant and graft 2022   • Unspecified hearing loss, unspecified ear 2022   • Chronic pain disorder 2022   • Fall 2022   • Closed fracture of right femur ([facility] ) 2022   • COPD (chronic obstructive pulmonary disease) ([facility] ) 2021   • Chronic diastolic heart failure ([facility] ) 2021   • Memory difficulty 2021   • Neuropathy 2021   • Coronary artery arteriosclerosis 2021   • Essential hypertension 2021   • Dyspnea on exertion    • Idiopathic hematuria 04/01/2021   • Kidney stone on left side 04/01/2021   • Flank pain 04/01/2021   • Obesity (BMI 30-39 9) 04/01/2021   • Chronic constipation 02/16/2021   • Vitamin D insufficiency 11/13/2020   • Symptomatic anemia 09/13/2020   • Former smoker 01/13/2020   • Nephrolithiasis 10/30/2019   • Bilateral leg edema 09/26/2019   • Hyperuricemia 07/22/2019   • Mixed simple and mucopurulent chronic bronchitis ([facility] ) 07/17/2019   • CAD (coronary artery disease) 05/13/2019   • Status post primary angioplasty with coronary stent 05/13/2019   • Stage 3a chronic kidney disease ([facility] ) 04/18/2019   • Hyponatremia 04/08/2019   • Serum total bilirubin elevated 04/08/2019   • Peripheral arteriosclerosis ([place] [address] ) 01/03/2019   • Pain in both lower extremities 01/31/2018   • Benign hypertension with chronic kidney disease, stage III ([facility]  ) 06/27/2017   • JOO (generalized anxiety disorder) 06/07/2016   • Chronic pain syndrome 03/17/2016   • Bilateral lumbar radiculopathy 03/17/2016   • Lumbar canal stenosis 03/17/2016   • Difficulty in walking 09/11/2015   • Persistent proteinuria 01/08/2015   • Urinary incontinence 10/30/2014   • Aneurysm of abdominal aorta 04/07/2014   • Centrilobular emphysema ([facility] ) 04/07/2014   • Deep venous insufficiency 04/07/2014   • Hyperlipidemia 04/07/2014   • Pacemaker 04/07/2014   • Fibromyalgia 08/08/2013   • 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/2013   • Fecal soiling 03/26/2013   • Class 2 obesity due to excess calories without serious comorbidity with body mass index (BMI) of 36 0 to 36 9 in adult 03/26/2013   • Chronic a-fib ([facility] ) 01/23/2013   • Allergic rhinitis 10/01/2012   • Benign prostatic hyperplasia 09/04/2012   • Carpal tunnel syndrome 09/04/2012   • Moderate or severe vision impairment, one eye 09/04/2012   • Smoker 09/04/2012   • JOVI (obstructive sleep apnea) 09/04/2012   • Venous insufficiency (chronic) (peripheral) 09/04/2012      LOS (days): 3  Geometric Mean LOS (GMLOS) (days):   Days to GMLOS: OBJECTIVE:  Risk of Unplanned Readmission Score: 35 29     Current admission status: Inpatient   Preferred Pharmacy:   [facility] [address] [place], [address] [facility] [address], [address] [address]  Phone: [phone] Fax: [phone]    [facility] [address] [facility], [address] [facility] [address]  [place] [address] [facility]  Phone: [phone] Fax: [place], [address]  [address] [facility] [address] [facility]  Phone: [phone] Fax: [phone]    [facility] [address] [place] [address]  Phone: [phone] Fax: [phone]    Primary Care Provider: Dayanna Reynolds DO    Primary Insurance: MEDICARE  Secondary Insurance: COMMERCIAL MISCELLANEOUS    DISCHARGE DETAILS:    Discharge planning discussed with[de-identified] DaughterClare of Choice: Yes  Comments - Freedom of Choice: SW following to assist with DCP  Pt and family are planning on pt returning home with home infusion and home care services  SW met with daughter to review [employer] lists of accepting infusion and home care agencies  Daughter requested care through [facility] and [employer]  Both agencies reserved in [address] [place] [address]  Current plan is to have medications and [facility] tomorrow early afternoon  [facility] will have a nurse visit scheduled between 1:00-2:00 pm to meet with pt and family to teach IV antibiotic administration  Confirmed that agency will also be providing therapy services at home  Daughter aware of plan and discharge timeframe  Daughter planning on transporting pt home when discharged  SW also discussed plan and timeframe with Dr Macy Correa    CM contacted family/caregiver?: Yes  Were Treatment Team discharge recommendations reviewed with patient/caregiver?: Yes  Did patient/caregiver verbalize understanding of patient care needs?: Yes  Were patient/caregiver advised of the risks associated with not following Treatment Team discharge recommendations?: Yes    Contacts  Patient Contacts: Devendra Bridges  Relationship to Patient[de-identified] Family (daughter)  Contact Method:  In Person  Reason/Outcome: Continuity of Care, Discharge [address]         Is the patient interested in [place] at discharge?: Yes  [address] requested[de-identified] Nursing, Occupational Therapy, Physical [address] Name[de-identified] [address] Provider[de-identified] PCP  Home Health Services Needed[de-identified] [facility], Evaluate Functional Status and Safety, Gait/ADL Training, Strengthening/Theraputic Exercises to Improve Function  Homebound Criteria Met[de-identified] Requires the Assistance of Another Person for Safe Ambulation or to Leave the Home, Uses an Assist Device (i e  cane, walker, etc)  Supporting Clincal Findings[de-identified] Limited Endurance, Fatigues Easliy in [employer]    DME Referral Provided  Referral made for DME?: No    Other Referral/Resources/Interventions Provided:  Interventions: HHC, Home Infusion  Referral Comments: See above    Treatment Team Recommendation: Short Term Rehab  Discharge Destination Plan[de-identified] Home with [place] at Discharge : Family

## 2022-12-28 NOTE — PROGRESS NOTES
Progress Note - Infectious Disease   Sarah Juares 80 y o  male MRN: [id number]  Unit/Bed#: [address] [place] [address] Encounter: [id number]      Impression/Plan:    1  MSSA bacteremia  Prior admission for MSSA bacteremia, recently discharged 12/20  Unclear source, may be skin translocation as the patient has multiple scabs on his arms and legs  This was complicated by the presence of a PPM  TTE with AV and MV thickening, no clear vegetations  MARY with no obvious large vegetations, but given significant calcification cannot rule out small lesion  CT spine without any abnormalities  CT chest with numerous pulmonary nodules, could be septic emboli vs less likely metastatic disease  This does raise concern for a primary endovascular source of infection  Discussed treatment options with the family and Cardiology   PPM removal is recommended in the setting of staph bacteremia regardless of the primary source, however the pacemaker has been in place 12 years and removal would be an extensive surgical procedure  Given his comorbidities, his family family elected to pursue a more conservative approach with antibiotics before any surgery  Cultures no growth this admission   -continue IV Cefazolin 2g q8hr  -Plan to complete a 6 week course of antibiotics from blood culture clearance through 1/17/23 (script provided to CM the patient now once to go home on IV antibiotics)  -Weekly CBC with differential and BMP on IV antibiotics  -will need surveillance blood cultures 2 weeks after completion of antibiotic course  -if there is recurrence, the patient will require pacemaker extraction  -will arrange outpatient ID follow-up 2 weeks after discharge  -Patient with tunneled PICC line in place, will need IR referral for PICC line removal after completion of IV antibiotics     2  Influenza A infection  Patient is not hypoxic and he is afebrile               -Continue Tamiflu to complete a 5-day course              -Monitor oxygen     3  Acute on chronic heart failure  Patient with evidence of volume overload, chest x-ray with pulmonary edema  Improving with diuresis  -Cardiology following              -Continue diuresis     4  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  Complicated by MSSA bacteremia  MARY no clear vegetations during prior admission   -continue management as above for bacteremia     5  Pulmonary nodules and adenopathy  CT chest during prior admission showed numerous new pulmonary nodules and mediastinal and hilar lymphadenopathy, concerning for metastases however in setting of staph aureus bacteremia could be septic emboli  Patient had a colonoscopy last month without abnormalities  He is a long term smoker    -Recommend repeat CT chest prior to discharge to assess for response of nodules to antibiotics; if persistent will require work up for malignancy     6  CKD  Creatinine stable at baseline    -Antibiotics dose adjusted as needed  -Monitor creatinine     I have discussed the above management plan in detail with the primary service, the patient  ID will follow  Antibiotics:  Cefazolin    Subjective:  Patient seen sitting up at bedside  No specific overnight events or complaints  Plan for discharge tomorrow with home IV antibiotics  Objective:  Vitals:  Temp:  [97 7 °F (36 5 °C)-98 4 °F (36 9 °C)] 98 4 °F (36 9 °C)  HR:  [70-71] 70  Resp:  [20] 20  BP: ()/(44-75) 136/75  SpO2:  [91 %-100 %] 94 %  Temp (24hrs), Av °F (36 7 °C), Min:97 7 °F (36 5 °C), Max:98 4 °F (36 9 °C)  Current: Temperature: 98 4 °F (36 9 °C)    Physical Exam:   General Appearance:  Chronically debilitated appearing but patient is in no acute distress   Throat: Oropharynx moist without lesions  Lungs:   Clear to auscultation bilaterally; no wheezes, rhonchi or rales; respirations unlabored   Heart:  RRR; no murmur, rub or gallop   Abdomen:   Soft, non-tender, non-distended, positive bowel sounds       Extremities: No clubbing, cyanosis or edema   Skin: No new rashes or lesions  Multiple ecchymoses on the arms  Right chest wall PICC line in place       Labs: All pertinent labs and imaging studies were personally reviewed  Results from last 7 days   Lab Units 12/28/22  0538 12/26/22  0528 12/25/22  0643   WBC Thousand/uL 8 40 10 95* 9 62   HEMOGLOBIN g/dL 8 2* 8 1* 8 4*   PLATELETS Thousands/uL 168 205 208     Results from last 7 days   Lab Units 12/28/22  0538 12/27/22  1015 12/26/22  0528 12/25/22  0643 12/24/22  1248   SODIUM mmol/L 137 138 138   < > 138   POTASSIUM mmol/L 3 6 3 4* 3 9   < > 4 4   CHLORIDE mmol/L 99 98 100   < > 101   CO2 mmol/L 34* 34* 33*   < > 34*   BUN mg/dL 24 27* 27*   < > 19   CREATININE mg/dL 1 39* 1 45* 1 45*   < > 1 13   EGFR ml/min/1 73sq m 46 44 44   < > 60   CALCIUM mg/dL 8 4 8 2* 8 5   < > 8 4   AST U/L  --   --   --   --  31   ALT U/L  --   --   --   --  12   ALK PHOS U/L  --   --   --   --  96    < > = values in this interval not displayed  Micro:  Results from last 7 days   Lab Units 12/24/22  1358 12/24/22  1343   BLOOD CULTURE  No Growth After 4 Days  No Growth After 4 Days  Imaging:  I have personally reviewed pertinent imaging study reports and images in PACS      Chest x-ray 12/24: Cardiomegaly and pulmonary vascular congestion, pulmonary edema     Other Studies:   I have personally reviewed pertinent reports

## 2022-12-28 NOTE — PROGRESS NOTES
Progress Note - Cardiology   [facility]     Deya Record 80 y o  male MRN: [id number]  : 1940  Unit/Bed#: 2 [place] [address] Encounter: [id number]    Assessment and Plan:   1  Arlet Jain A: On Tamiflu per primary team     2   Chronic diastolic heart failure: Unfortunately patient continues to retain fluid secondary to severe aortic stenosis    -   Discontinue IV Lasix and transition patient to Demadex 20 mg twice daily    -   Unable to use ACE/ARB/hydralazine or nitrates secondary to severe aortic stenosis and dropping patient's afterload    -   Monitor I&O, daily weights and labs    -   Low-sodium diet     3   Severe to critical aortic stenosis: Echocardiogram demonstrated severely reduced mobility of the aortic valve with severe stenosis with mean gradient of 40 mmHg, peak gradient of 72 mmHg and dimensionless index of 0 28 this is consistent with a valve area of approximately 0 9 cm²    -   We have discussed numerous times with patient that he needs to CT surgery for consideration for valve replacement  -   This will need to be done once his bacteremia has resolved     4   MSSA bacteremia: Patient on IV Ancef per infectious disease    -   MARY performed during previous admission did not demonstrate vegetation on pacemaker lead or on the valves     5   COPD     6   Hypertension: Blood pressure stable after aggressive diuresis     7   Chronic kidney disease stage III: Baseline creatinine appears to be 1 3-1 6    -   May need to accept higher creatinine to keep patient euvolemic     8   Chronic anemia     9   Chronic atrial fibrillation: Patient has [employer] pacemaker    -   Continue Xarelto 15 mg daily    Subjective / Objective:   Patient seen and examined  No specific complaints offered at this time  Plan is for discharge home tomorrow with IV antibiotic therapy  We will transition patient to oral diuretics and monitor      Vitals: Blood pressure (!) 110/48, pulse 71, temperature 97 9 °F (36 6 °C), resp  rate 20, height 6' 2" (1 88 m), weight 118 kg (260 lb 2 3 oz), SpO2 93 %  Vitals:    12/27/22 0600 12/28/22 0559   Weight: 118 kg (260 lb 2 3 oz) 118 kg (260 lb 2 3 oz)     Body mass index is 33 4 kg/m²  BP Readings from Last 3 Encounters:   12/28/22 (!) 110/48   12/20/22 144/72   11/22/22 119/64     Orthostatic Blood Pressures    Flowsheet Row Most Recent Value   Blood Pressure 110/48 filed at 12/28/2022 1002   Patient Position - Orthostatic VS Sitting filed at 12/27/2022 0828        I/O       12/26 0701  12/27 0700 12/27 0701  12/28 0700 12/28 0701  12/29 0700    P  O   510     I V  (mL/kg) 10 (0 1) 10 (0 1)     IV Piggyback  50     Total Intake(mL/kg) 10 (0 1) 570 (4 8)     Urine (mL/kg/hr) 1118 (0 4) 1500 (0 5)     Total Output 1118 1500     Net -1108 -930            Unmeasured Urine Occurrence 2 x          Invasive Devices     Central Venous Catheter Line  Duration           CVC Central Lines 12/12/22 Single Right Subclavian 15 days                  Intake/Output Summary (Last 24 hours) at 12/28/2022 1118  Last data filed at 12/28/2022 0559  Gross per 24 hour   Intake 250 ml   Output 800 ml   Net -550 ml         Physical Exam:   Physical Exam  Vitals and nursing note reviewed  Constitutional:       Appearance: Normal appearance  He is obese  HENT:      Right Ear: External ear normal       Left Ear: External ear normal    Eyes:      General: No scleral icterus  Right eye: No discharge  Left eye: No discharge  Cardiovascular:      Rate and Rhythm: Normal rate and regular rhythm  Pulses: Normal pulses  Heart sounds: Murmur heard  Systolic murmur is present with a grade of 3/6  Pulmonary:      Effort: Pulmonary effort is normal  No accessory muscle usage or respiratory distress  Breath sounds: Examination of the right-lower field reveals decreased breath sounds  Examination of the left-lower field reveals decreased breath sounds  Decreased breath sounds present  Abdominal:      General: Bowel sounds are normal  There is no distension  Palpations: Abdomen is soft  Musculoskeletal:      Right lower leg: Edema present  Left lower leg: Edema present  Comments: Positive chronic venous stasis due to dependency   Skin:     General: Skin is warm and dry  Capillary Refill: Capillary refill takes less than 2 seconds  Neurological:      General: No focal deficit present  Mental Status: He is alert  Mental status is at baseline  Psychiatric:         Mood and Affect: Mood normal          Behavior: Behavior is cooperative              Medications/ Allergies:     Current Facility-Administered Medications   Medication Dose Route Frequency Provider Last Rate   • acetaminophen  650 mg Oral Q6H [address] Lynn Revankar, DO     • acetylcysteine  3 mL Nebulization TID Lynn Revankar, DO     • allopurinol  200 mg Oral Daily Lynn Revankar, DO     • atorvastatin  40 mg Oral Daily With Morgan-Santosh Revankar, DO     • ceFAZolin  2,000 mg Intravenous Q8H Lynn Revankar, DO 2,000 mg (12/28/22 0201)   • docusate sodium  100 mg Oral BID Lynn Revankar, DO     • DULoxetine  60 mg Oral BID Lynn Revankar, DO     • finasteride  5 mg Oral Daily Lynn Revankar, DO     • Fluticasone Furoate-Vilanterol  1 puff Inhalation Daily NAVA Coleman     • gabapentin  100 mg Oral HS Lynn Revankar, DO     • levalbuterol  1 25 mg Nebulization TID Lynn Revankar, DO     • lidocaine  1 patch Topical Daily Lynn Revankar, DO     • methocarbamol  500 mg Oral Q6H PRN Lynn Revankar, DO     • nicotine  1 patch Transdermal Daily Lynn Revankar, DO     • oseltamivir  75 mg Oral Q12H [address] Lynn Revankar, DO     • pantoprazole  40 mg Oral Early Morning Lynn Revankar, DO     • polyethylene glycol  17 g Oral Daily PRN Lynn Revankar, DO     • potassium chloride  40 mEq Oral Once NAVA Ortiz     • [START ON 12/29/2022] predniSONE  10 mg Oral Daily Lynn Revankar, DO     • rivaroxaban  15 mg Oral Daily With Breakfast Lynn Revankar, DO     • saccharomyces boulardii  250 mg Oral BID Lynn Revankar, DO     • senna  8 6 mg Oral HS Lynn Revankar, DO     • sodium chloride  3 mL Nebulization TID Lynn Revankar, DO     • torsemide  40 mg Oral BID (diuretic) NAVA Lizama     • umeclidinium  1 puff Inhalation Daily NAVA Coleman       methocarbamol, 500 mg, Q6H PRN  polyethylene glycol, 17 g, Daily PRN      No Known Allergies    VTE Pharmacologic Prophylaxis:   Sequential compression device (Venodyne)     Labs:   Troponins:  Results from last 7 days   Lab Units 12/24/22  1724 12/24/22  1458   HSTNI D2 ng/L  --  -7   HSTNI D4 ng/L -7  --      CBC with diff:  Results from last 7 days   Lab Units 12/28/22  0538 12/26/22  0528 12/25/22  0643 12/24/22  1248   WBC Thousand/uL 8 40 10 95* 9 62 11 00*   HEMOGLOBIN g/dL 8 2* 8 1* 8 4* 8 9*   HEMATOCRIT % 27 9* 27 7* 28 7* 30 5*   MCV fL 90 90 90 91   PLATELETS Thousands/uL 168 205 208 204   MCH pg 26 5* 26 2* 26 3* 26 6*   MCHC g/dL 29 4* 29 2* 29 3* 29 2*   RDW % 20 3* 20 4* 20 3* 20 3*   MPV fL 9 4 9 1 9 3 8 8*   NRBC AUTO /100 WBCs  --   --   --  0     CMP:  Results from last 7 days   Lab Units 12/28/22  0538 12/27/22  1015 12/26/22  0528 12/25/22  0643 12/24/22  1248   SODIUM mmol/L 137 138 138 137 138   POTASSIUM mmol/L 3 6 3 4* 3 9 4 3 4 4   CHLORIDE mmol/L 99 98 100 99 101   CO2 mmol/L 34* 34* 33* 32 34*   ANION GAP mmol/L 4 6 5 6 3*   BUN mg/dL 24 27* 27* 27* 19   CREATININE mg/dL 1 39* 1 45* 1 45* 1 40* 1 13   CALCIUM mg/dL 8 4 8 2* 8 5 8 5 8 4   AST U/L  --   --   --   --  31   ALT U/L  --   --   --   --  12   ALK PHOS U/L  --   --   --   --  96   TOTAL PROTEIN g/dL  --   --   --   --  7 2   ALBUMIN g/dL  --   --   --   --  2 1*   TOTAL BILIRUBIN mg/dL  --   --   --   --  0 72   EGFR ml/min/1 73sq m 46 44 44 46 60     Magnesium:  Results from last 7 days   Lab Units 12/25/22  4691 12/24/22  1248   MAGNESIUM mg/dL 2 2 2 2     Coags:  Results from last 7 days   Lab Units 12/24/22  1248   PTT seconds 40*   INR  2 23*       Imaging & Testing   I have personally reviewed pertinent reports  XR chest 1 view portable    Result Date: 12/24/2022  Narrative: CHEST INDICATION:   shortness of breath  COMPARISON:  Comparison is made to prior studies, most recent is dated December 15, 2022  EXAM PERFORMED/VIEWS:  XR CHEST PORTABLE FINDINGS:  Right PICC with its tip near the cavoatrial junction  Left subclavian intracardiac device unchanged  Cardiomegaly  Prominence of the vascular markings compatible with pulmonary venous congestion and prominence of the interstitium likely due to interstitial pulmonary edema  Small right and possible left pleural effusion with bibasilar atelectasis/consolidation  Osseous structures appear within normal limits for patient age  Impression: Cardiomegaly and pulmonary vascular congestion with interstitial pulmonary edema  Small right and probable smaller left pleural effusion with bibasilar atelectasis /consolidation  Workstation performed: [id number]     MARY    Result Date: 12/7/2022  Narrative: •  Left Ventricle: Left ventricular cavity size is normal  Wall thickness is moderately increased  There is mild to moderate concentric hypertrophy  The left ventricular ejection fraction is 55% by visual estimation  Systolic function is normal  Although no diagnostic regional wall motion abnormality was identified, this possibility cannot be completely excluded on the basis of this study  •  IVS: There is abnormal septal motion consistent with right ventricular pacing  •  Left Atrium: The atrium is moderately dilated  There is no thrombus  There is no mass  There is mild spontaneous echo contrast  •  Right Atrium: The atrium is mildly dilated  There is no thrombus  There is a possible mass   •  Atrial Septum: There is no atrial septal defect by saline contrast  No patent foramen ovale detected using saline contrast injection at rest  •  Left Atrial Appendage: There is reduced function  There is a possible thrombus  There is mild spontaneous echo contrast  •  Aortic Valve: The aortic valve is trileaflet  The leaflets are moderately thickened  The leaflets are moderately calcified  There is severely reduced mobility  No large echodensity consistent with shape of vegetation noted however due to heavy calcification cannot rule out small vegetations There is mild regurgitation  •  Mitral Valve: There is moderate thickening of the anterior leaflet and posterior leaflet  Calcification of anterior and posterior leaflet noted  No large vegetation noted however studies not sensitive enough to rule out small vegetation due to acoustic shadowing and calcification  There is moderate annular calcification  There is mild to moderate regurgitation  •  Tricuspid Valve: There is mild regurgitation  •  MARY shows no large vegetation however study is not sensitive enough to rule out small vegetation due to heavy calcification  No clear vegetation noted on the visualized portion of pacemaker wires  Report discussed with patient, patient's family and infectious disease specialist and medical team         [facility] [place]  [facility]      "This note was completed in part utilizing [employer] direct voice recognition software  Grammatical errors, random word insertion, spelling mistakes, and incomplete sentences may be an occasional consequence of the system secondary to software limitations, ambient noise and hardware issues  Please read the chart carefully and recognize, using context, where substitutions have occurred    If you have any questions or concerns about the context, text or information contained within the body of this dictation, please contact myself, the provider, for further clarification "

## 2022-12-28 NOTE — PROGRESS NOTES
[address]  Progress Note Lizeth Muller 1940, 80 y o  male MRN: [id number]  Unit/Bed#: 2 [place] [address] Encounter: [id number]  Primary Care Provider: Thien Mosley DO   Date and time admitted to hospital: 12/24/2022 12:08 PM    * Influenza  Assessment & Plan  Presented to the ER due to shortness of breath from [facility]  · Influenza A positive  · Continue Tamiflu to complete a 5-day course, last dose tomorrow  · Intermittently on oxygen  Home O2 eval prior to discharge    Chronic diastolic heart failure (HCC)  Assessment & Plan  Wt Readings from Last 3 Encounters:   12/28/22 118 kg (260 lb 2 3 oz)   12/07/22 120 kg (265 lb)   11/22/22 121 kg (265 lb 10 5 oz)     Chest x-ray with pulmonary vascular congestion/pulmonary edema with bibasilar atelectasis  · Patient received a dose of IV Lasix in the ER  · IV lasix + albumin given 12/25  Was on 40 mg of IV Lasix twice daily, transition to Demadex 40 mg twice daily  · Discussed with patient and family the importance of follow-up after discharge for possible aortic valve replacement  · Received IV Lasix and IV Bumex on prior admission          MSSA bacteremia  Assessment & Plan  On recent admission noted to have MSSA bacteremia  · Cont IV Ancef 2 g every 8 hours for total of 6 weeks through 1/17/23  · Patient underwent TTE and MARY on prior admission  · ID consulted as patient is now going home with home infusions and this will need to be set up    COPD (chronic obstructive pulmonary disease) (HCC)  Assessment & Plan  Received 125 mg of IV Solu-Medrol in the ER  On exam on wheezing  · Continue prednisone taper as was recommended on prior admission      Anemia  Assessment & Plan  Hemoglobin stable    Results from last 7 days   Lab Units 12/28/22  0538 12/26/22  0528 12/25/22  0643 12/24/22  1248   HEMOGLOBIN g/dL 8 2* 8 1* 8 4* 8 9*   HEMATOCRIT % 27 9* 27 7* 28 7* 30 5*   MCV fL 90 90 90 91       Essential hypertension  Assessment & Plan  Continue imdur    Stage 3a chronic kidney disease [facility])  Assessment & Plan  Lab Results   Component Value Date    EGFR 46 2022    EGFR 44 2022    EGFR 44 2022    CREATININE 1 39 (H) 2022    CREATININE 1 45 (H) 2022    CREATININE 1 45 (H) 2022     Baseline creatinine 1 3-1 6  Creatinine continues to remain at baseline  Repeat labs in a m  Chronic a-fib (HCC)  Assessment & Plan  Status post pacemaker placement  Continue Xarelto      VTE Pharmacologic Prophylaxis: VTE Score: 4 Moderate Risk (Score 3-4) - Pharmacological DVT Prophylaxis Ordered: rivaroxaban (Xarelto)  Patient Centered Rounds: I performed bedside rounds with nursing staff today  Discussions with Specialists or Other Care Team Provider: yes - cardio    Education and Discussions with Family / Patient: Updated  (wife) at bedside  Time Spent for Care: 40 min  More than 50% of total time spent on counseling and coordination of care as described above  Current Length of Stay: 3 day(s)  Current Patient Status: Inpatient   Certification Statement: The patient will continue to require additional inpatient hospital stay due to Chronic CHF requiring transition to [facility]  Discharge Plan: Anticipate discharge tomorrow to home with home services  Code Status: Level 1 - Full Code    Subjective:     Patient states legs are less swollen today  Looking forward to going home soon  Patient states he has not been able to get much sleep at night as he cannot seem to lay still in bed    Objective:     Vitals:   Temp (24hrs), Av 9 °F (36 6 °C), Min:97 7 °F (36 5 °C), Max:98 °F (36 7 °C)    Temp:  [97 7 °F (36 5 °C)-98 °F (36 7 °C)] 97 7 °F (36 5 °C)  HR:  [70-72] 71  Resp:  [20] 20  BP: ()/(38-67) 135/67  SpO2:  [93 %-100 %] 99 %  Body mass index is 33 4 kg/m²  Input and Output Summary (last 24 hours):      Intake/Output Summary (Last 24 hours) at 2022 5854  Last data filed at 12/28/2022 0559  Gross per 24 hour   Intake 300 ml   Output 1200 ml   Net -900 ml       Physical Exam:   Physical Exam  Vitals reviewed  Constitutional:       General: He is not in acute distress  Appearance: He is not ill-appearing  HENT:      Head: Normocephalic and atraumatic  Eyes:      General:         Right eye: No discharge  Left eye: No discharge  Cardiovascular:      Rate and Rhythm: Normal rate and regular rhythm  Heart sounds: Murmur heard  Pulmonary:      Effort: Pulmonary effort is normal  No respiratory distress  Breath sounds: No wheezing or rales  Comments: Decreased breath sounds bilaterally  Abdominal:      General: Bowel sounds are normal  There is no distension  Palpations: Abdomen is soft  Tenderness: There is no abdominal tenderness  Musculoskeletal:      Right lower leg: Edema present  Left lower leg: Edema present  Neurological:      Mental Status: He is alert and oriented to person, place, and time  Additional Data:     Labs:  Results from last 7 days   Lab Units 12/28/22  0538 12/25/22  0643 12/24/22  1248   WBC Thousand/uL 8 40   < > 11 00*   HEMOGLOBIN g/dL 8 2*   < > 8 9*   HEMATOCRIT % 27 9*   < > 30 5*   PLATELETS Thousands/uL 168   < > 204   NEUTROS PCT %  --   --  89*   LYMPHS PCT %  --   --  4*   MONOS PCT %  --   --  6   EOS PCT %  --   --  0    < > = values in this interval not displayed       Results from last 7 days   Lab Units 12/28/22  0538 12/25/22  0643 12/24/22  1248   SODIUM mmol/L 137   < > 138   POTASSIUM mmol/L 3 6   < > 4 4   CHLORIDE mmol/L 99   < > 101   CO2 mmol/L 34*   < > 34*   BUN mg/dL 24   < > 19   CREATININE mg/dL 1 39*   < > 1 13   ANION GAP mmol/L 4   < > 3*   CALCIUM mg/dL 8 4   < > 8 4   ALBUMIN g/dL  --   --  2 1*   TOTAL BILIRUBIN mg/dL  --   --  0 72   ALK PHOS U/L  --   --  96   ALT U/L  --   --  12   AST U/L  --   --  31   GLUCOSE RANDOM mg/dL 129   < > 131    < > = values in this interval not displayed  Results from last 7 days   Lab Units 12/24/22  1248   INR  2 23*             Results from last 7 days   Lab Units 12/24/22  1248   LACTIC ACID mmol/L 1 5       Lines/Drains:  Invasive Devices     Central Venous Catheter Line  Duration           CVC Central Lines 12/12/22 Single Right Subclavian 15 days                Central Line:  Goal for removal: N/A - Discharging with PICC for IV ABX/medications             Imaging: No pertinent imaging reviewed  Recent Cultures (last 7 days):   Results from last 7 days   Lab Units 12/24/22  1358 12/24/22  1343   BLOOD CULTURE  No Growth at 72 hrs  No Growth at 72 hrs         Last 24 Hours Medication List:   Current Facility-Administered Medications   Medication Dose Route Frequency Provider Last Rate   • acetaminophen  650 mg Oral Q6H [address] Lynn Revankar, DO     • acetylcysteine  3 mL Nebulization TID Lynn Revankar, DO     • allopurinol  200 mg Oral Daily Lynn Revankar, DO     • atorvastatin  40 mg Oral Daily With Morgan-Santosh Revankar, DO     • ceFAZolin  2,000 mg Intravenous Q8H Lynn Revankar, DO 2,000 mg (12/28/22 0201)   • docusate sodium  100 mg Oral BID Lynn Revankar, DO     • DULoxetine  60 mg Oral BID Lynn Revankar, DO     • finasteride  5 mg Oral Daily Lynn Revankar, DO     • Fluticasone Furoate-Vilanterol  1 puff Inhalation Daily NAAV Coleman     • furosemide  40 mg Intravenous BID (diuretic) Lynn Revankar, DO     • gabapentin  100 mg Oral HS Lynn Revankar, DO     • levalbuterol  1 25 mg Nebulization TID Lynn Revankar, DO     • lidocaine  1 patch Topical Daily Lynn Revankar, DO     • methocarbamol  500 mg Oral Q6H PRN Lynn Revankar, DO     • nicotine  1 patch Transdermal Daily Lynn Revankar, DO     • oseltamivir  75 mg Oral Q12H [address] Lynn Revankar, DO     • pantoprazole  40 mg Oral Early Morning Lynn Revankar, DO     • polyethylene glycol  17 g Oral Daily PRN Lynn Revankar, DO     • [START ON 12/29/2022] predniSONE  10 mg Oral Daily Lynn Revankar, DO     • predniSONE  20 mg Oral Daily Lynn Revankar, DO     • rivaroxaban  15 mg Oral Daily With Breakfast Lynn Revruchi, DO     • saccharomyces boulardii  250 mg Oral BID Lynn Revankar, DO     • senna  8 6 mg Oral HS Lynn Revjonesar, DO     • sodium chloride  3 mL Nebulization TID Lynn Revjonesar, DO     • umeclidinium  1 puff Inhalation Daily NAVA Coleman          Today, Patient Was Seen By: Juana Patel DO    **Please Note: This note may have been constructed using a voice recognition system  **

## 2022-12-28 NOTE — OCCUPATIONAL THERAPY NOTE
OT EVALUATION       12/28/22 0941   Note Type   Note type Evaluation   Pain Assessment   Pain Assessment Tool 0-10   Pain Score No Pain   Restrictions/Precautions   Other Precautions Chair Alarm; Bed Alarm; Fall Risk   Home Living   Type of [address] level  (2 [address]ATHEN)   [address]; [address]; Wheelchair-manual   Prior Function   Level of [place] Independent with ADLs; Independent with functional mobility; Needs assistance with IADLS   Lives With Spouse  (daughters)   Receives Help From Family   ADL   Eating Assistance 5  Supervision/Setup   Grooming Assistance 5  Supervision/Setup   UB Bathing Assistance 4  Minimal Assistance   LB Bathing Assistance 3  Moderate Assistance   UB Dressing Assistance 4  Minimal Assistance    [address] 3  Moderate Assistance   Toileting Assistance  4  Minimal Assistance   Bed Mobility   Sit to Supine 3  Moderate assistance   Additional items LE management   Transfers   Sit to Stand 3  Moderate assistance   Stand to Sit 3  Moderate assistance   Functional Mobility   Functional Mobility 4  Minimal assistance   Additional Comments few steps to head of bed with RW, pt limited by fatigue   Balance   Static Sitting Fair +   Dynamic Sitting Fair   Static Standing Fair   Dynamic Standing Fair -   Activity Tolerance   Activity Tolerance Patient limited by fatigue   RUE Assessment   RUE Assessment WFL   LUE Assessment   LUE Assessment WFL   Cognition   Overall Cognitive Status WFL   Arousal/Participation Cooperative   Attention Within functional limits   Orientation Level Oriented X4   Following Commands Follows multistep commands with increased time or repetition   Assessment   Limitation Decreased ADL status; Decreased UE strength;Decreased Safe judgement during ADL;Decreased endurance;Decreased high-level ADLs; Decreased self-care trans  (decreased balance and mobility)   Prognosis Good   Assessment Patient evaluated by Occupational Therapy  Patient admitted with Influenza  The patients occupational profile, medical and therapy history includes a extensive additional review of physical, cognitive, or psychosocial history related to current functional performance  Comorbidities affecting functional mobility and ADLS include: arthritis, CAD, CKD, gout, hypertension and pacemaker  Prior to admission, patient was independent with functional mobility with walker, independent with ADLS and requiring assist for IADLS  The evaluation identifies the following performance deficits: weakness, impaired balance, decreased endurance, increased fall risk, new onset of impairment of functional mobility, decreased ADLS, decreased IADLS, decreased activity tolerance, decreased safety awareness, impaired judgement and decreased strength, that result in activity limitations and/or participation restrictions  This evaluation requires clinical decision making of high complexity, because the patient presents with comorbidites that affect occupational performance and required significant modification of tasks or assistance with consideration of multiple treatment options  The Barthel Index was used as a functional outcome tool presenting with a score of Barthel Index Score: 35, indicating marked limitations of functional mobility and ADLS  The patient's raw score on the AM-PAC Daily Activity inpatient short form is 17, standardized score is 37 26, less than 39 4  Patients at this level are likely to benefit from DC to post-acute rehabilitation services  Please refer to the recommendation of the Occupational Therapist for safe DC planning  Patient will benefit from skilled Occupational Therapy services to address above deficits and facilitate a safe return to prior level of function     Goals   Patient Goals get stronger and go home   STG Time Frame   (1-7 days)   Short Term Goal  Goals established to promote Patient Goals: get stronger and go home: Eating: independent; Grooming: independent seated; Bathing: min assist; Upper Body Dressing supervision; Lower Body Dressing: min assist; Toileting: supervision; Patient will increase ambulatory standard toilet transfer to supervision with rolling walker to increase performance and safety with ADLS and functional mobility; Patient will increase standing tolerance to 3 minutes during ADL task to decrease assistance level and decrease fall risk; Patient will increase bed mobility to min assist in preparation for ADLS and transfers; Patient will increase functional mobility to and from bathroom with rolling walker with supervision to increase performance with ADLS and to use a toilet; Patient will tolerate 10 minutes of UE ROM/strengthening to increase general activity tolerance and performance in ADLS/IADLS; Patient will improve functional activity tolerance to 10 minutes of sustained functional tasks to increase participation in basic self-care and decrease assistance level;  Patient will increase dynamic sitting balance to fair+ to improve the ability to sit at edge of bed or on a chair for ADLS;  Patient will increase dynamic standing balance to fair to improve postural stability and decrease fall risk during standing ADLS and transfers  LTG Time Frame   (8-14 days)   Long Term Goal Bathing: supervision; Upper Body Dressing independent; Lower Body Dressing: supervision; Toileting: independent; Patient will increase ambulatory standard toilet transfer to independent with rolling walker to increase performance and safety with ADLS and functional mobility; Patient will increase standing tolerance to 6 minutes during ADL task to decrease assistance level and decrease fall risk; Patient will increase bed mobility to supervision in preparation for ADLS and transfers;  Patient will increase functional mobility to and from bathroom with rolling walker with independent to increase performance with ADLS and to use a toilet; Patient will tolerate 20 minutes of UE ROM/strengthening to increase general activity tolerance and performance in ADLS/IADLS; Patient will improve functional activity tolerance to 20 minutes of sustained functional tasks to increase participation in basic self-care and decrease assistance level;  Patient will increase dynamic sitting balance to good to improve the ability to sit at edge of bed or on a chair for ADLS;  Patient will increase dynamic standing balance to fair+ to improve postural stability and decrease fall risk during standing ADLS and transfers  Pt will score >/= 21/24 on AM-PAC Daily Activity Inpatient scale to promote safe independence with ADLs and functional mobility; Pt will score >/= 65/100 on Barthel Index in order to decrease caregiver assistance needed and increase ability to perform ADLs and functional mobility  Plan   Treatment Interventions ADL retraining;UE strengthening/ROM; Functional transfer training; Endurance training;Patient/family training;Equipment evaluation/education; Activityengagement; Compensatory technique education   Goal Expiration Date 01/11/23   OT Frequency 3-5x/wk   Recommendation   OT Discharge Recommendation Post acute rehabilitation services  (pt is refusing)   AM-PAC Daily Activity Inpatient   Lower Body Dressing 2   Bathing 2   Toileting 3   Upper Body Dressing 3   Grooming 3   Eating 4   Daily Activity Raw Score 17   Daily Activity Standardized Score (Calc for Raw Score >=11) 37 26   AM-PAC Applied Cognition Inpatient   Following a Speech/Presentation 4   Understanding Ordinary Conversation 4   Taking Medications 4   Remembering Where Things Are Placed or Put Away 4   Remembering List of 4-5 Errands 4   Taking Care of Complicated Tasks 4   Applied Cognition Raw Score 24   Applied Cognition Standardized Score 62 21   Barthel Index   Feeding 10   Bathing 0   Grooming Score 0   Dressing Score 5   Bladder Score 5   Bowels Score 5   Toilet Use Score 5   Transfers (Bed/Chair) Score 5   Mobility (Level Surface) Score 0   Stairs Score 0   Barthel Index Score 35   Licensure   [place] License Number  Corea Samuel Erick Bahman 87 [id number]/L [id number]

## 2022-12-29 ENCOUNTER — TELEPHONE (OUTPATIENT)
Dept: FAMILY MEDICINE CLINIC | Facility: CLINIC | Age: 82
End: 2022-12-29

## 2022-12-29 ENCOUNTER — TRANSITIONAL CARE MANAGEMENT (OUTPATIENT)
Dept: FAMILY MEDICINE CLINIC | Facility: CLINIC | Age: 82
End: 2022-12-29

## 2022-12-29 ENCOUNTER — APPOINTMENT (INPATIENT)
Dept: RADIOLOGY | Facility: HOSPITAL | Age: 82
End: 2022-12-29

## 2022-12-29 VITALS
HEART RATE: 71 BPM | SYSTOLIC BLOOD PRESSURE: 153 MMHG | BODY MASS INDEX: 33.39 KG/M2 | RESPIRATION RATE: 20 BRPM | HEIGHT: 74 IN | WEIGHT: 260.14 LBS | OXYGEN SATURATION: 98 % | TEMPERATURE: 97.6 F | DIASTOLIC BLOOD PRESSURE: 68 MMHG

## 2022-12-29 DIAGNOSIS — I50.33 ACUTE ON CHRONIC DIASTOLIC HEART FAILURE (HCC): ICD-10-CM

## 2022-12-29 LAB
ANION GAP SERPL CALCULATED.3IONS-SCNC: 4 MMOL/L (ref 4–13)
BACTERIA BLD CULT: NORMAL
BACTERIA BLD CULT: NORMAL
BUN SERPL-MCNC: 25 MG/DL (ref 5–25)
CALCIUM SERPL-MCNC: 8.1 MG/DL (ref 8.3–10.1)
CHLORIDE SERPL-SCNC: 101 MMOL/L (ref 96–108)
CO2 SERPL-SCNC: 35 MMOL/L (ref 21–32)
CREAT SERPL-MCNC: 1.3 MG/DL (ref 0.6–1.3)
GFR SERPL CREATININE-BSD FRML MDRD: 50 ML/MIN/1.73SQ M
GLUCOSE SERPL-MCNC: 126 MG/DL (ref 65–140)
POTASSIUM SERPL-SCNC: 3.7 MMOL/L (ref 3.5–5.3)
SODIUM SERPL-SCNC: 140 MMOL/L (ref 135–147)

## 2022-12-29 RX ORDER — NICOTINE 21 MG/24HR
1 PATCH, TRANSDERMAL 24 HOURS TRANSDERMAL DAILY
Qty: 28 PATCH | Refills: 0 | Status: SHIPPED | OUTPATIENT
Start: 2022-12-30 | End: 2023-01-03

## 2022-12-29 RX ORDER — PREDNISONE 10 MG/1
10 TABLET ORAL DAILY
Qty: 2 TABLET | Refills: 0
Start: 2022-12-30 | End: 2022-12-30 | Stop reason: SDUPTHER

## 2022-12-29 RX ADMIN — PREDNISONE 10 MG: 10 TABLET ORAL at 09:26

## 2022-12-29 RX ADMIN — TORSEMIDE 40 MG: 20 TABLET ORAL at 09:25

## 2022-12-29 RX ADMIN — FINASTERIDE 5 MG: 5 TABLET, FILM COATED ORAL at 09:26

## 2022-12-29 RX ADMIN — PANTOPRAZOLE SODIUM 40 MG: 40 TABLET, DELAYED RELEASE ORAL at 04:37

## 2022-12-29 RX ADMIN — UMECLIDINIUM 1 PUFF: 62.5 AEROSOL, POWDER ORAL at 09:27

## 2022-12-29 RX ADMIN — FLUTICASONE FUROATE AND VILANTEROL TRIFENATATE 1 PUFF: 100; 25 POWDER RESPIRATORY (INHALATION) at 09:27

## 2022-12-29 RX ADMIN — OSELTAMIVIR PHOSPHATE 75 MG: 75 CAPSULE ORAL at 09:26

## 2022-12-29 RX ADMIN — CEFAZOLIN SODIUM 2000 MG: 2 SOLUTION INTRAVENOUS at 04:37

## 2022-12-29 RX ADMIN — LEVALBUTEROL HYDROCHLORIDE 1.25 MG: 1.25 SOLUTION, CONCENTRATE RESPIRATORY (INHALATION) at 07:16

## 2022-12-29 RX ADMIN — ALLOPURINOL 200 MG: 100 TABLET ORAL at 09:26

## 2022-12-29 RX ADMIN — DULOXETINE HYDROCHLORIDE 60 MG: 60 CAPSULE, DELAYED RELEASE ORAL at 09:25

## 2022-12-29 RX ADMIN — LIDOCAINE 5% 1 PATCH: 700 PATCH TOPICAL at 09:25

## 2022-12-29 RX ADMIN — ACETAMINOPHEN 650 MG: 325 TABLET, FILM COATED ORAL at 04:37

## 2022-12-29 RX ADMIN — ACETYLCYSTEINE 600 MG: 200 SOLUTION ORAL; RESPIRATORY (INHALATION) at 07:16

## 2022-12-29 RX ADMIN — Medication 250 MG: at 09:26

## 2022-12-29 RX ADMIN — DOCUSATE SODIUM 100 MG: 100 CAPSULE, LIQUID FILLED ORAL at 09:26

## 2022-12-29 RX ADMIN — RIVAROXABAN 15 MG: 15 TABLET, FILM COATED ORAL at 09:25

## 2022-12-29 RX ADMIN — ISODIUM CHLORIDE 3 ML: 0.03 SOLUTION RESPIRATORY (INHALATION) at 07:16

## 2022-12-29 NOTE — CASE MANAGEMENT
Case Management Discharge Planning Note    Patient name Pee Palacios  Location [facility] [address] MRN [id number]  : 1940 Date 2022       Current Admission Date: 2022  Current Admission Alondra Guidry   Patient Active Problem List    Diagnosis Date Noted   • Influenza 2022   • Anemia 2022   • Depression with anxiety 2022   • MSSA bacteremia 2022   • Acute encephalopathy 2022   • Abnormal CAT scan 2022   • Elevated troponin 2022   • Left hip pain 2022   • Cervical strain 2022   • Lump of skin of left lower extremity 2022   • Primary osteoarthritis of both knees 2022   • Depression 2022   • Generalized weakness 2022   • Valvular heart disease 2022   • Pressure injury of right buttock, unstageable ([facility] ) 2022   • Ambulatory dysfunction 2022   • Dysphagia, pharyngoesophageal phase 2022   • Gastro-esophageal reflux disease without esophagitis 2022   • Generalized muscle weakness 2022   • History of falling 2022   • Iron deficiency anemia 2022   • Major depressive disorder, recurrent, unspecified ([facility] ) 2022   • Moderate to severe aortic stenosis 2022   • Other abnormalities of gait and mobility 2022   • Other specified polyneuropathies 2022   • Pulmonary hypertension due to left heart disease ([facility] ) 2022   • Presence of coronary angioplasty implant and graft 2022   • Unspecified hearing loss, unspecified ear 2022   • Chronic pain disorder 2022   • Fall 2022   • Closed fracture of right femur ([facility] ) 2022   • COPD (chronic obstructive pulmonary disease) ([facility] ) 2021   • Chronic diastolic heart failure ([facility] ) 2021   • Memory difficulty 2021   • Neuropathy 2021   • Coronary artery arteriosclerosis 2021   • Essential hypertension 2021   • Dyspnea on exertion    • Idiopathic hematuria 04/01/2021   • Kidney stone on left side 04/01/2021   • Flank pain 04/01/2021   • Obesity (BMI 30-39 9) 04/01/2021   • Chronic constipation 02/16/2021   • Vitamin D insufficiency 11/13/2020   • Symptomatic anemia 09/13/2020   • Former smoker 01/13/2020   • Nephrolithiasis 10/30/2019   • Bilateral leg edema 09/26/2019   • Hyperuricemia 07/22/2019   • Mixed simple and mucopurulent chronic bronchitis ([facility] ) 07/17/2019   • CAD (coronary artery disease) 05/13/2019   • Status post primary angioplasty with coronary stent 05/13/2019   • Stage 3a chronic kidney disease ([facility] ) 04/18/2019   • Hyponatremia 04/08/2019   • Serum total bilirubin elevated 04/08/2019   • Peripheral arteriosclerosis ([place] [address] ) 01/03/2019   • Pain in both lower extremities 01/31/2018   • Benign hypertension with chronic kidney disease, stage III ([facility]  ) 06/27/2017   • JOO (generalized anxiety disorder) 06/07/2016   • Chronic pain syndrome 03/17/2016   • Bilateral lumbar radiculopathy 03/17/2016   • Lumbar canal stenosis 03/17/2016   • Difficulty in walking 09/11/2015   • Persistent proteinuria 01/08/2015   • Urinary incontinence 10/30/2014   • Aneurysm of abdominal aorta 04/07/2014   • Centrilobular emphysema ([facility] ) 04/07/2014   • Deep venous insufficiency 04/07/2014   • Hyperlipidemia 04/07/2014   • Pacemaker 04/07/2014   • Fibromyalgia 08/08/2013   • 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/2013   • Fecal soiling 03/26/2013   • Class 2 obesity due to excess calories without serious comorbidity with body mass index (BMI) of 36 0 to 36 9 in adult 03/26/2013   • Chronic a-fib ([facility] ) 01/23/2013   • Allergic rhinitis 10/01/2012   • Benign prostatic hyperplasia 09/04/2012   • Carpal tunnel syndrome 09/04/2012   • Moderate or severe vision impairment, one eye 09/04/2012   • Smoker 09/04/2012   • JOVI (obstructive sleep apnea) 09/04/2012   • Venous insufficiency (chronic) (peripheral) 09/04/2012      LOS (days): 4  Geometric Mean LOS (GMLOS) (days): 2 90  Days to GMLOS:-1 1     OBJECTIVE:  Risk of Unplanned Readmission Score: 38 1     Current admission status: Inpatient   Preferred Pharmacy:   [facility] Trisha Lewis, [address] [facility] [address], [address] [address]  Phone: [phone] Fax: [phone]    [facility] [address] [facility], [address] [facility] [address]  [place] [address]  Phone: [phone] Fax: [place], [address]  [address] [facility] [address] [facility]  Phone: [phone] Fax: [phone]    [facility] [address] [address]  Phone: [phone] Fax: [phone]    Primary Care Provider: Enmanuel Luo DO    Primary Insurance: MEDICARE  Secondary Insurance: COMMERCIAL MISCELLANEOUS    DISCHARGE DETAILS:    Other Referral/Resources/Interventions Provided:  Interventions: C, Home Infusion  Referral Comments: Discharge planned today  [facility] [place] to confirm delivery of medication and supplies by 1:00 pm today  SW spoke with Mihaela Cole at [facility] [address] to discuss delivery time and confirm scheduled nurse visit  Pt's daughter has arrived to transport pt home  No other discharge needs expressed by family at this time      Treatment Team Recommendation: Short Term Rehab  Discharge Destination Plan[de-identified] Home with [address] [facility] (and home infusion)  Transport at Discharge : Family

## 2022-12-29 NOTE — ASSESSMENT & PLAN NOTE
On recent admission noted to have MSSA bacteremia  · Cont IV Ancef 2 g every 8 hours for total of 6 weeks through 1/17/23  · Patient underwent TTE and MARY on prior admission  · ID consulted as patient is now going home with home infusions and this will need to be set up

## 2022-12-29 NOTE — ASSESSMENT & PLAN NOTE
Presented to the ER due to shortness of breath from [facility]  · Influenza A positive  · Continue Tamiflu to complete a 5-day course, last dose tomorrow  · Intermittently on oxygen    Home O2 eval prior to discharge

## 2022-12-29 NOTE — QUICK NOTE
CT chest reviewed  Demonstrates less than a dozen bilateral nodules, some which have increased and some which have decreased from CT 3 weeks prior   "These rapid changes are more suggestive of an infectious/inflammatory etiology than malignancy "  Patient has follow-up with pulmonary scheduled 1/11/2023  He will need repeat CT scan in 4 to 6 weeks after completion of antibiotic course  ID follow-up scheduled 1/16/2023  Remainder of plan as outlined in progress note yesterday

## 2022-12-29 NOTE — ASSESSMENT & PLAN NOTE
Wt Readings from Last 3 Encounters:   12/28/22 118 kg (260 lb 2 3 oz)   12/07/22 120 kg (265 lb)   11/22/22 121 kg (265 lb 10 5 oz)     Chest x-ray with pulmonary vascular congestion/pulmonary edema with bibasilar atelectasis  · Patient received a dose of IV Lasix in the ER  · IV lasix + albumin given 12/25    Was on 40 mg of IV Lasix twice daily, transition to Demadex 40 mg twice daily  · Discussed with patient and family the importance of follow-up after discharge for possible aortic valve replacement  · Received IV Lasix and IV Bumex on prior admission

## 2022-12-29 NOTE — TELEPHONE ENCOUNTER
Spoke to the patient and his daughter for TCM questions  She was wondering if the patient can take Furosemide 40mg today, and then start the Torsemide 40mg tomorrow  She stated that he was on the Furosemide, the hospital has just switched him  I spoke to Priya Sanchez, she stated that is fine for him to take the Furosemide today and start Torsemide tomorrow  Please send the Torsemide to the pharmacy, so they can pick it up tomorrow for him    Yancey Cabot, [employer]

## 2022-12-29 NOTE — PHYSICAL THERAPY NOTE
PT TREATMENT     12/29/22 0915   Note Type   Note Type Treatment   Pain Assessment   Pain Assessment Tool 0-10   Pain Score No Pain   Restrictions/Precautions   Other Precautions Fall Risk;Bed Alarm; Chair Alarm   General   Chart Reviewed Yes   Cognition   Overall Cognitive Status WFL   Arousal/Participation Arousable   Subjective   Subjective "I don't know why I'm so tired"   Bed Mobility   Sit to Supine 4  Minimal assistance   Transfers   Sit to Stand 4  Minimal assistance   Stand to Sit 4  Minimal assistance   Stand pivot 4  Minimal assistance   Additional items Verbal cues  (with walker)   Ambulation/Elevation   Gait pattern   (very flexed posture, cervical spine fully flexed)   Gait Assistance 4  Minimal assist   Assistive Device Rolling walker   Distance 3 feet chair to bed per pt request due to extreme sleepiness  Balance   Static Sitting Fair +   Static Standing Fair -   Activity Tolerance   Activity Tolerance Patient limited by fatigue   Exercises   Neuro re-ed 2x10 each ankle pumps, LAQ, hip flexion seated in chair with moderate verbal cues to stay awake   Assessment   Prognosis Good   Problem List Decreased strength;Decreased endurance; Impaired balance;Decreased mobility;Obesity   Assessment Upone arrival to room, pt sleeping in a chair slumped forward  Pt easily aroused but had a hard time waking up  Pt agreed to participate in PT but when pt tried to walk he was too tired and asked to be put back to bed  Pt fell asleep almost immediately after getting in bed  Continue to recommend STR however pt is going home with his family today  The patient's AM-PAC Basic Mobility Inpatient Short Form Raw Score is 15  A Raw score of less than or equal to 16 suggests the patient may benefit from discharge to post-acute rehabilitation services  Plan   Treatment/Interventions Patient/family training; Endurance training; Therapeutic exercise;LE strengthening/ROM; Elevations; Functional transfer training;Spoke to case management; Bed mobility; Equipment eval/education   Progress Slow progress, decreased activity tolerance   PT Frequency Other (Comment)  (5x/w)   Recommendation   PT Discharge Recommendation Post acute rehabilitation services   AM-PAC Basic Mobility Inpatient   Turning in Bed Without Bedrails 3   Lying on Back to Sitting on Edge of Flat Bed 3   Moving Bed to Chair 3   Standing Up From Chair 3   Walk in Room 2   Climb 3-5 Stairs 1   Basic Mobility Inpatient Raw Score 15   Basic Mobility Standardized Score 36 97   Highest Level Of Mobility   JH-HLM Goal 4: Move to chair/commode   -HLM Achieved 4: Move to chair/commode   End of Consult   Patient Position at End of Consult All needs within reach; Supine;Bed/Chair alarm activated   [employer] License Number  Sam Lal PT [id number]

## 2022-12-29 NOTE — ASSESSMENT & PLAN NOTE
Lab Results   Component Value Date    EGFR 46 12/28/2022    EGFR 44 12/27/2022    EGFR 44 12/26/2022    CREATININE 1 39 (H) 12/28/2022    CREATININE 1 45 (H) 12/27/2022    CREATININE 1 45 (H) 12/26/2022     Baseline creatinine 1 3-1 6  Creatinine continues to remain at baseline  Repeat labs in a m

## 2022-12-29 NOTE — ASSESSMENT & PLAN NOTE
Hemoglobin stable    Results from last 7 days   Lab Units 12/28/22  0538 12/26/22  0528 12/25/22  0643 12/24/22  1248   HEMOGLOBIN g/dL 8 2* 8 1* 8 4* 8 9*   HEMATOCRIT % 27 9* 27 7* 28 7* 30 5*   MCV fL 90 90 90 91

## 2022-12-29 NOTE — DISCHARGE SUMMARY
Mandy 45  Discharge- Pee Palacios 1940, 80 y o  male MRN: [id number]  Unit/Bed#: [address] [address] Encounter: [id number]  Primary Care Provider: Thien Mosley DO   Date and time admitted to hospital: 12/24/2022 12:08 PM    * Influenza  Assessment & Plan  Presented to the ER due to shortness of breath from [facility]  · Influenza A positive  · Completed 5 day course of Tamiflu  · Stable on RA    Acute on chronic diastolic heart failure [facility])  Assessment & Plan  Wt Readings from Last 3 Encounters:   12/28/22 118 kg (260 lb 2 3 oz)   12/07/22 120 kg (265 lb)   11/22/22 121 kg (265 lb 10 5 oz)     Chest x-ray with pulmonary vascular congestion/pulmonary edema with bibasilar atelectasis  · Likely due to severe aortic stenosis  Patient received a dose of IV Lasix in the ER  · IV lasix + albumin given 12/25  Was on 40 mg of IV Lasix twice daily, transitioned to Demadex 40 mg twice daily on d/c  · Discussed with patient and family the importance of follow-up after discharge for possible aortic valve replacement          MSSA bacteremia  Assessment & Plan  On recent admission noted to have MSSA bacteremia  · Cont IV Ancef 2 g every 8 hours for total of 6 weeks through 1/17/23  · Patient underwent TTE and MARY on prior admission  · Pt going home with home infusions  · Outpatient labs per ID    COPD (chronic obstructive pulmonary disease) (HCC)-resolved as of 1/1/2023  Assessment & Plan  Received 125 mg of IV Solu-Medrol in the ER  On exam on wheezing  · Continue prednisone taper as was recommended on prior admission    Anemia  Assessment & Plan  Hemoglobin stable      Results from last 7 days   Lab Units 12/28/22  0538   HEMOGLOBIN g/dL 8 2*   HEMATOCRIT % 27 9*   MCV fL 90       Essential hypertension  Assessment & Plan  Discontinued imdur    Stage 3a chronic kidney disease [facility])  Assessment & Plan  Lab Results   Component Value Date    EGFR 50 12/29/2022    EGFR 46 12/28/2022    EGFR 44 12/27/2022    CREATININE 1 30 12/29/2022    CREATININE 1 39 (H) 12/28/2022    CREATININE 1 45 (H) 12/27/2022     Baseline creatinine 1 3-1 6  Creatinine continues to remain at baseline with diureses  Repeat labs after d/c    Chronic a-fib [facility])  Assessment & Plan  Status post pacemaker placement  Continue Xarelto  Medical Problems     Resolved Problems  Date Reviewed: 12/29/2022   None       Discharging Physician / Practitioner: Gillian Bullock DO  PCP: Annmarie Canchola DO  Admission Date:   Admission Orders (From admission, onward)     Ordered        12/25/22 1220  Inpatient Admission  Once            12/24/22 1429  Place in Observation  Once                      Discharge Date: 12/29/22    Consultations During Hospital Stay:  Cardio  ID    Procedures Performed:   · none    Incidental Findings:   · B/L lung nodules, mediastinal lymphadenopath  · I reviewed the above mentioned incidental findings with the patient and/or family and they expressed understanding  Test Results Pending at Discharge (will require follow up):   · none     Outpatient Tests Requested:  · CBC, BMP    Complications:  none    Reason for Admission: shortness of breath    Hospital Course:   Viry Carreon is a 80 y o  male patient who originally presented to the hospital on 12/24/2022 due to Shortness of breath that started on day of admission  Received 3 NTG at nursing home  Found to be flu (+) and received Tamiflu here  Also diuresed with IV lasix and seen by cardiology  Seen by ID due to recent hx of MSSA bacteremia and need for home IV infusion  Symptoms improved  Cleared by all consultants involved in his care prior to d/c  D/C plan including CT findings d/w pateitn and duaghter      Please see above list of diagnoses and related plan for additional information  Condition at Discharge: stable    Discharge Day Visit / Exam:   Subjective:  PAtient states he's doing well and ready to go home as soon as possible   Breathing has significantly improved compared to before    Vitals: Blood Pressure: 153/68 (12/28/22 2304)  Pulse: 71 (12/29/22 0716)  Temperature: 97 6 °F (36 4 °C) (12/28/22 2304)  Temp Source: Oral (12/28/22 1556)  Respirations: 20 (12/29/22 0716)  Height: 6' 2" (188 cm) (12/24/22 1532)  Weight - Scale: 118 kg (260 lb 2 3 oz) (12/28/22 0559)  SpO2: 98 % (12/29/22 0736)  Exam:   Physical Exam  Vitals reviewed  Constitutional:       General: He is not in acute distress  Appearance: He is not ill-appearing  HENT:      Head: Normocephalic and atraumatic  Eyes:      General:         Right eye: No discharge  Left eye: No discharge  Cardiovascular:      Rate and Rhythm: Normal rate and regular rhythm  Heart sounds: Murmur heard  Pulmonary:      Effort: Pulmonary effort is normal  No respiratory distress  Breath sounds: No wheezing or rales  Comments: Decreased breath sounds B/L  Abdominal:      General: Bowel sounds are normal  There is no distension  Palpations: Abdomen is soft  Tenderness: There is no abdominal tenderness  Musculoskeletal:      Comments: Improving leg edema   Neurological:      Mental Status: He is alert and oriented to person, place, and time  Discussion with Family: Updated  (daughter) via phone  Discharge instructions/Information to patient and family:   See after visit summary for information provided to patient and family  Provisions for Follow-Up Care:  See after visit summary for information related to follow-up care and any pertinent home health orders  Disposition:   Home with [facility] Services (Reminder: Complete face to face encounter)    Planned Readmission: no     Discharge Statement:  I spent > 30 minutes discharging the patient  This time was spent on the day of discharge  I had direct contact with the patient on the day of discharge   Greater than 50% of the total time was spent examining patient, answering all patient questions, arranging and discussing plan of care with patient as well as directly providing post-discharge instructions  Additional time then spent on discharge activities  Discharge Medications:  See after visit summary for reconciled discharge medications provided to patient and/or family        **Please Note: This note may have been constructed using a voice recognition system**

## 2022-12-29 NOTE — TELEPHONE ENCOUNTER
Evelyne Cartagena from [id number] called, left a very detailed message about PT/ OT Home Health/Telehealth and medication refills  In addition needs to know the day the pt should get b/w    Pls call Evelyne Cartagena back as she has many questions and details regarding the patients health and visit today to home   Evelyne Cartagena # [phone]

## 2022-12-29 NOTE — TELEPHONE ENCOUNTER
Tashi Tipton from [id number] called, left a very detailed message about PT/ OT Home Health/Telehealth and medication refills  In addition needs to know the day the pt should get b/w    Pls call Tashi Tipton back as she has many questions and details regarding the patients health and visit today to home   Tashi Tipton # [phone]

## 2022-12-29 NOTE — TELEPHONE ENCOUNTER
Pt was just discharged from hospital is requesting Toremide 40 mg to be sent to his  Pharmacy/ [facility]

## 2022-12-29 NOTE — PLAN OF CARE
Problem: MOBILITY - ADULT  Goal: Maintain or return to baseline ADL function  Description: INTERVENTIONS:  -  Assess patient's ability to carry out ADLs; assess patient's baseline for ADL function and identify physical deficits which impact ability to perform ADLs (bathing, care of mouth/teeth, toileting, grooming, dressing, etc )  - Assess/evaluate cause of self-care deficits   - Assess range of motion  - Assess patient's mobility; develop plan if impaired  - Assess patient's need for assistive devices and provide as appropriate  - Encourage maximum independence but intervene and supervise when necessary  - Involve family in performance of ADLs  - Assess for home care needs following discharge   - Consider OT consult to assist with ADL evaluation and planning for discharge  - Provide patient education as appropriate  Outcome: Progressing  Goal: Maintains/Returns to pre admission functional level  Description: INTERVENTIONS:  - Perform BMAT or MOVE assessment daily    - Set and communicate daily mobility goal to care team and patient/family/caregiver     - Collaborate with rehabilitation services on mobility goals if consulted  - Out of bed for toileting  - Record patient progress and toleration of activity level   Outcome: Progressing     Problem: Potential for Falls  Goal: Patient will remain free of falls  Description: INTERVENTIONS:  - Educate patient/family on patient safety including physical limitations  - Instruct patient to call for assistance with activity   - Consult OT/PT to assist with strengthening/mobility   - Keep Call bell within reach  - Keep bed low and locked with side rails adjusted as appropriate  - Keep care items and personal belongings within reach  - Initiate and maintain comfort rounds  - Make Fall Risk Sign visible to staff  - Apply yellow socks and bracelet for high fall risk patients  - Consider moving patient to room near nurses station  Outcome: Progressing     Problem: PAIN - ADULT  Goal: Verbalizes/displays adequate comfort level or baseline comfort level  Description: Interventions:  - Encourage patient to monitor pain and request assistance  - Assess pain using appropriate pain scale  - Administer analgesics based on type and severity of pain and evaluate response  - Implement non-pharmacological measures as appropriate and evaluate response  - Consider cultural and social influences on pain and pain management  - Notify physician/advanced practitioner if interventions unsuccessful or patient reports new pain  Outcome: Progressing     Problem: INFECTION - ADULT  Goal: Absence or prevention of progression during hospitalization  Description: INTERVENTIONS:  - Assess and monitor for signs and symptoms of infection  - Monitor lab/diagnostic results  - Monitor all insertion sites, i e  indwelling lines, tubes, and drains  - Monitor endotracheal if appropriate and nasal secretions for changes in amount and color  - [place] appropriate cooling/warming therapies per order  - Administer medications as ordered  - Instruct and encourage patient and family to use good hand hygiene technique  - Identify and instruct in appropriate isolation precautions for identified infection/condition  Outcome: Progressing  Goal: Absence of fever/infection during neutropenic period  Description: INTERVENTIONS:  - Monitor WBC    Outcome: Progressing     Problem: SAFETY ADULT  Goal: Maintain or return to baseline ADL function  Description: INTERVENTIONS:  -  Assess patient's ability to carry out ADLs; assess patient's baseline for ADL function and identify physical deficits which impact ability to perform ADLs (bathing, care of mouth/teeth, toileting, grooming, dressing, etc )  - Assess/evaluate cause of self-care deficits   - Assess range of motion  - Assess patient's mobility; develop plan if impaired  - Assess patient's need for assistive devices and provide as appropriate  - Encourage maximum independence but intervene and supervise when necessary  - Involve family in performance of ADLs  - Assess for home care needs following discharge   - Consider OT consult to assist with ADL evaluation and planning for discharge  - Provide patient education as appropriate  Outcome: Progressing  Goal: Maintains/Returns to pre admission functional level  Description: INTERVENTIONS:  - Perform BMAT or MOVE assessment daily    - Set and communicate daily mobility goal to care team and patient/family/caregiver     - Collaborate with rehabilitation services on mobility goals if consulted  - Out of bed for toileting  - Record patient progress and toleration of activity level   Outcome: Progressing  Goal: Patient will remain free of falls  Description: INTERVENTIONS:  - Educate patient/family on patient safety including physical limitations  - Instruct patient to call for assistance with activity   - Consult OT/PT to assist with strengthening/mobility   - Keep Call bell within reach  - Keep bed low and locked with side rails adjusted as appropriate  - Keep care items and personal belongings within reach  - Initiate and maintain comfort rounds  - Make Fall Risk Sign visible to staff  - Apply yellow socks and bracelet for high fall risk patients  - Consider moving patient to room near nurses station  Outcome: Progressing     Problem: DISCHARGE PLANNING  Goal: Discharge to home or other facility with appropriate resources  Description: INTERVENTIONS:  - Identify barriers to discharge w/patient and caregiver  - Arrange for needed discharge resources and transportation as appropriate  - Identify discharge learning needs (meds, wound care, etc )  - Arrange for interpretive services to assist at discharge as needed  - Refer to Case Management Department for coordinating discharge planning if the patient needs post-hospital services based on physician/advanced practitioner order or complex needs related to functional status, cognitive ability, or social support system  Outcome: Progressing     Problem: Knowledge Deficit  Goal: Patient/family/caregiver demonstrates understanding of disease process, treatment plan, medications, and discharge instructions  Description: Complete learning assessment and assess knowledge base    Interventions:  - Provide teaching at level of understanding  - Provide teaching via preferred learning methods  Outcome: Progressing     Problem: RESPIRATORY - ADULT  Goal: Achieves optimal ventilation and oxygenation  Description: INTERVENTIONS:  - Assess for changes in respiratory status  - Assess for changes in mentation and behavior  - Position to facilitate oxygenation and minimize respiratory effort  - Oxygen administered by appropriate delivery if ordered  - Initiate smoking cessation education as indicated  - Encourage broncho-pulmonary hygiene including cough, deep breathe, Incentive Spirometry  - Assess the need for suctioning and aspirate as needed  - Assess and instruct to report SOB or any respiratory difficulty  - Respiratory Therapy support as indicated  Outcome: Progressing

## 2022-12-29 NOTE — PLAN OF CARE
Problem: MOBILITY - ADULT  Goal: Maintain or return to baseline ADL function  Description: INTERVENTIONS:  -  Assess patient's ability to carry out ADLs; assess patient's baseline for ADL function and identify physical deficits which impact ability to perform ADLs (bathing, care of mouth/teeth, toileting, grooming, dressing, etc )  - Assess/evaluate cause of self-care deficits   - Assess range of motion  - Assess patient's mobility; develop plan if impaired  - Assess patient's need for assistive devices and provide as appropriate  - Encourage maximum independence but intervene and supervise when necessary  - Involve family in performance of ADLs  - Assess for home care needs following discharge   - Consider OT consult to assist with ADL evaluation and planning for discharge  - Provide patient education as appropriate  Outcome: Progressing     Problem: SAFETY ADULT  Goal: Maintain or return to baseline ADL function  Description: INTERVENTIONS:  -  Assess patient's ability to carry out ADLs; assess patient's baseline for ADL function and identify physical deficits which impact ability to perform ADLs (bathing, care of mouth/teeth, toileting, grooming, dressing, etc )  - Assess/evaluate cause of self-care deficits   - Assess range of motion  - Assess patient's mobility; develop plan if impaired  - Assess patient's need for assistive devices and provide as appropriate  - Encourage maximum independence but intervene and supervise when necessary  - Involve family in performance of ADLs  - Assess for home care needs following discharge   - Consider OT consult to assist with ADL evaluation and planning for discharge  - Provide patient education as appropriate  Outcome: Progressing     Problem: Knowledge Deficit  Goal: Patient/family/caregiver demonstrates understanding of disease process, treatment plan, medications, and discharge instructions  Description: Complete learning assessment and assess knowledge base   Interventions:  - Provide teaching at level of understanding  - Provide teaching via preferred learning methods  Outcome: Progressing

## 2022-12-30 ENCOUNTER — PATIENT OUTREACH (OUTPATIENT)
Dept: FAMILY MEDICINE CLINIC | Facility: CLINIC | Age: 82
End: 2022-12-30

## 2022-12-30 ENCOUNTER — TELEPHONE (OUTPATIENT)
Dept: PULMONOLOGY | Facility: CLINIC | Age: 82
End: 2022-12-30

## 2022-12-30 DIAGNOSIS — J43.2 CENTRILOBULAR EMPHYSEMA (HCC): Chronic | ICD-10-CM

## 2022-12-30 DIAGNOSIS — Z71.89 COMPLEX CARE COORDINATION: Primary | ICD-10-CM

## 2022-12-30 RX ORDER — ACETYLCYSTEINE 200 MG/ML
3 SOLUTION ORAL; RESPIRATORY (INHALATION)
Qty: 270 ML | Refills: 0 | Status: SHIPPED | OUTPATIENT
Start: 2022-12-30

## 2022-12-30 RX ORDER — FLUTICASONE FUROATE, UMECLIDINIUM BROMIDE AND VILANTEROL TRIFENATATE 200; 62.5; 25 UG/1; UG/1; UG/1
1 POWDER RESPIRATORY (INHALATION) DAILY
Qty: 60 EACH | Refills: 5 | Status: SHIPPED | OUTPATIENT
Start: 2022-12-30

## 2022-12-30 RX ORDER — LEVALBUTEROL 1.25 MG/.5ML
1.25 SOLUTION, CONCENTRATE RESPIRATORY (INHALATION)
Qty: 45 ML | Refills: 0 | Status: SHIPPED | OUTPATIENT
Start: 2022-12-30

## 2022-12-30 RX ORDER — PREDNISONE 10 MG/1
10 TABLET ORAL DAILY
Qty: 2 TABLET | Refills: 0 | Status: SHIPPED | OUTPATIENT
Start: 2022-12-30 | End: 2023-01-01

## 2022-12-30 NOTE — TELEPHONE ENCOUNTER
He can use the duoneb with his other medications  He also should follow up with his Pulmonologist Dr Christoph Mendoza

## 2022-12-30 NOTE — TELEPHONE ENCOUNTER
S/w [facility]  He has weekly labs, cbc and bmp  I advised they are for the IV abx and infection monitoring by ID so be sure they get the results each time  Reminded that his CHF meds and mgmt have been by his cardiologist Dr Dana Palmer so he needs to refill and monitor those meds (I e  diuretics) and f/u suggested  Wound care orders for arm and buttock discussed

## 2022-12-30 NOTE — TELEPHONE ENCOUNTER
Called [facility] and left detailed msg  Pt can use Duoneb w/other medications and pt should follow up w/pulmonologist Dr Luke Grossman

## 2022-12-30 NOTE — TELEPHONE ENCOUNTER
Bette Guthrie called back and stated that they new a new order with instructions on how he should be taking his DuoNeb in addition to med refills

## 2022-12-30 NOTE — TELEPHONE ENCOUNTER
Noted  Please remind the pharmacy that this medication comes from his cardiologist Dr Brenda Rowley and future refills needs to come from him

## 2022-12-30 NOTE — TELEPHONE ENCOUNTER
Please see other telephone encounter where Dr Ayo Lee has spoken with Jay Hernandes regarding patients care    [facility]  Remy Dire

## 2022-12-30 NOTE — TELEPHONE ENCOUNTER
Spoke to Sharon Coburn, she stated that her question is can the patient use his Duo-Neb along with the other medications on his list? She stated that he has had it in the past, and that is why he still has it on hand  She was hesitant to let him use it, with the other medications he is on   [facility] [employer] stated that if she does not answer, we can leave a detailed message on her phone: [phone]    Oliva Perera LPN

## 2022-12-30 NOTE — PROGRESS NOTES
HRR referral for CCM  Chart was reviewed and it is noted that patient was an IP 12/5-12/20 with MSSA Bacteremia with unknown source  [facility] [place] [facility]cef on 12/20  He was then readmitted to the hospital on 12/24 (sent from [facility]) with increased SOB and found to have the Flu  While in the hospital PT recommended patient return to [facility] at dc but patient and family declined and wanted to take patient home and do home IV Abx infusions themselves  Patient was discharged home with his wife Giovany Corona and his twin daughters on 12/29  This OPCM RN called patients wife Giovany Corona and confirmed the above  She states patient is doing okay but still has a lot of swelling in his feet and ankles  She states he is still confused, maybe a little more so since he was in the hospital  She states his breathing is okay when he is sitting down but if he wants to get up and use the walker he can walk no more wagoner maybe 20 feet  Giovany Corona says she has to follow behind him with a walker so he doesn't fall  Patient is not on Oxygen  She states she spoke with the Visiting Nurse this morning and they are trying to get a nurse out to see him today because of the LE swelling and confusion, She is waiting for a call back  Cefazolin 2 gm IV q 8 hrs  Giovany Corona states that the [facility] delivered patients Antibiotics and supplies yesterday and patient has [facility] coming in for Nursing, Telehealth, PT and OT  She states the Nurse came out last night and went over the infusion with her and her daughters  She says all 3 of them are being taught how to do the infusions  Giovany Corona states that her and her daughters reviewed the medications with the nurse yesterday and there were 2 medications that were new and not sent to the pharmacy  She says the Dr called them in and her daughter is picking them up  They are Torsemide and Prednisone   She declined reviewing other medications at this time and going over the adult assessment  She agrees to take my contact information and have one of her daughters call me today or next week  Anabel Mckeon states she will be the one transporting patient to all appointments  OPCM RN to follow

## 2022-12-30 NOTE — TELEPHONE ENCOUNTER
Jay Hernandes from [employer] called and said that they were going over all the patient meds and they don't add up has Trelogy but anoro  Suppose to be vaponax albuterol xopanex does not have any but has duo neb not on his profile for discharge prednisone 10mg but does not have that  Patient has a lot of swelling  Please contact Jay Hernandes [phone]  Please contact family as well to let them know

## 2022-12-30 NOTE — TELEPHONE ENCOUNTER
[facility] pharmacy called and said they do not have the Torsemide 40mg available in stock  I gave them verbal to substitute (2) 20 mg tablets for each dose patient should receive  The pharmacy said they do not carry 40 mg pills at all    Crista Castillo

## 2023-01-01 ENCOUNTER — TELEPHONE (OUTPATIENT)
Dept: INFECTIOUS DISEASES | Facility: CLINIC | Age: 83
End: 2023-01-01

## 2023-01-01 ENCOUNTER — APPOINTMENT (INPATIENT)
Dept: RADIOLOGY | Facility: HOSPITAL | Age: 83
End: 2023-01-01

## 2023-01-01 ENCOUNTER — HOSPITAL ENCOUNTER (INPATIENT)
Facility: HOSPITAL | Age: 83
LOS: 2 days | End: 2023-01-12
Attending: INTERNAL MEDICINE | Admitting: FAMILY MEDICINE

## 2023-01-01 ENCOUNTER — HOSPITAL ENCOUNTER (INPATIENT)
Facility: HOSPITAL | Age: 83
LOS: 8 days | End: 2023-01-20
Attending: INTERNAL MEDICINE | Admitting: INTERNAL MEDICINE

## 2023-01-01 ENCOUNTER — PATIENT OUTREACH (OUTPATIENT)
Dept: FAMILY MEDICINE CLINIC | Facility: CLINIC | Age: 83
End: 2023-01-01

## 2023-01-01 ENCOUNTER — TELEPHONE (OUTPATIENT)
Dept: HEMATOLOGY ONCOLOGY | Facility: CLINIC | Age: 83
End: 2023-01-01

## 2023-01-01 ENCOUNTER — APPOINTMENT (EMERGENCY)
Dept: RADIOLOGY | Facility: HOSPITAL | Age: 83
End: 2023-01-01

## 2023-01-01 ENCOUNTER — APPOINTMENT (OUTPATIENT)
Dept: RADIOLOGY | Facility: HOSPITAL | Age: 83
End: 2023-01-01
Attending: INTERNAL MEDICINE

## 2023-01-01 ENCOUNTER — APPOINTMENT (INPATIENT)
Dept: NON INVASIVE DIAGNOSTICS | Facility: HOSPITAL | Age: 83
End: 2023-01-01

## 2023-01-01 ENCOUNTER — APPOINTMENT (OUTPATIENT)
Dept: RADIOLOGY | Facility: HOSPITAL | Age: 83
End: 2023-01-01

## 2023-01-01 VITALS
BODY MASS INDEX: 30.35 KG/M2 | RESPIRATION RATE: 40 BRPM | HEIGHT: 74 IN | OXYGEN SATURATION: 97 % | HEART RATE: 86 BPM | SYSTOLIC BLOOD PRESSURE: 140 MMHG | DIASTOLIC BLOOD PRESSURE: 72 MMHG | TEMPERATURE: 100.8 F | WEIGHT: 236.5 LBS

## 2023-01-01 VITALS
HEIGHT: 74 IN | BODY MASS INDEX: 31.57 KG/M2 | WEIGHT: 246 LBS | SYSTOLIC BLOOD PRESSURE: 150 MMHG | DIASTOLIC BLOOD PRESSURE: 80 MMHG | RESPIRATION RATE: 17 BRPM | OXYGEN SATURATION: 95 % | HEART RATE: 84 BPM | TEMPERATURE: 98.5 F

## 2023-01-01 DIAGNOSIS — G93.9 BRAIN LESION: Primary | ICD-10-CM

## 2023-01-01 DIAGNOSIS — R29.6 MULTIPLE FALLS: ICD-10-CM

## 2023-01-01 DIAGNOSIS — R41.82 ALTERED MENTAL STATUS, UNSPECIFIED ALTERED MENTAL STATUS TYPE: Primary | ICD-10-CM

## 2023-01-01 DIAGNOSIS — G06.0 BRAIN ABSCESS: ICD-10-CM

## 2023-01-01 DIAGNOSIS — E87.6 HYPOKALEMIA: ICD-10-CM

## 2023-01-01 DIAGNOSIS — G04.90 BRAIN INFECTION: ICD-10-CM

## 2023-01-01 DIAGNOSIS — R77.8 TROPONIN LEVEL ELEVATED: ICD-10-CM

## 2023-01-01 DIAGNOSIS — R78.81 MSSA BACTEREMIA: ICD-10-CM

## 2023-01-01 DIAGNOSIS — B95.61 MSSA BACTEREMIA: ICD-10-CM

## 2023-01-01 DIAGNOSIS — N18.31 STAGE 3A CHRONIC KIDNEY DISEASE (HCC): ICD-10-CM

## 2023-01-01 DIAGNOSIS — S31.000A WOUND OF SACRAL REGION, INITIAL ENCOUNTER: ICD-10-CM

## 2023-01-01 PROBLEM — J44.9 COPD (CHRONIC OBSTRUCTIVE PULMONARY DISEASE) (HCC): Status: RESOLVED | Noted: 2021-11-21 | Resolved: 2023-01-01

## 2023-01-01 PROBLEM — G89.4 CHRONIC PAIN DISORDER: Status: RESOLVED | Noted: 2022-01-01 | Resolved: 2023-01-01

## 2023-01-01 LAB
2HR DELTA HS TROPONIN: -5 NG/L
ALBUMIN SERPL BCP-MCNC: 2 G/DL (ref 3.5–5)
ALBUMIN SERPL BCP-MCNC: 2 G/DL (ref 3.5–5)
ALBUMIN SERPL BCP-MCNC: 2.1 G/DL (ref 3.5–5)
ALBUMIN SERPL BCP-MCNC: 2.2 G/DL (ref 3.5–5)
ALBUMIN SERPL BCP-MCNC: 2.4 G/DL (ref 3.5–5)
ALP SERPL-CCNC: 62 U/L (ref 46–116)
ALP SERPL-CCNC: 68 U/L (ref 46–116)
ALP SERPL-CCNC: 70 U/L (ref 46–116)
ALP SERPL-CCNC: 77 U/L (ref 46–116)
ALP SERPL-CCNC: 91 U/L (ref 46–116)
ALT SERPL W P-5'-P-CCNC: 6 U/L (ref 12–78)
ALT SERPL W P-5'-P-CCNC: <6 U/L (ref 12–78)
ANION GAP SERPL CALCULATED.3IONS-SCNC: 10 MMOL/L (ref 4–13)
ANION GAP SERPL CALCULATED.3IONS-SCNC: 11 MMOL/L (ref 4–13)
ANION GAP SERPL CALCULATED.3IONS-SCNC: 6 MMOL/L (ref 4–13)
ANION GAP SERPL CALCULATED.3IONS-SCNC: 6 MMOL/L (ref 4–13)
ANION GAP SERPL CALCULATED.3IONS-SCNC: 7 MMOL/L (ref 4–13)
ANION GAP SERPL CALCULATED.3IONS-SCNC: 8 MMOL/L (ref 4–13)
ANION GAP SERPL CALCULATED.3IONS-SCNC: 9 MMOL/L (ref 4–13)
AORTIC ROOT: 3.2 CM
AORTIC VALVE MEAN VELOCITY: 26.1 M/S
APICAL FOUR CHAMBER EJECTION FRACTION: 43 %
ARTERIAL PATENCY WRIST A: YES
AST SERPL W P-5'-P-CCNC: 15 U/L (ref 5–45)
AST SERPL W P-5'-P-CCNC: 16 U/L (ref 5–45)
AST SERPL W P-5'-P-CCNC: 16 U/L (ref 5–45)
AST SERPL W P-5'-P-CCNC: 17 U/L (ref 5–45)
AST SERPL W P-5'-P-CCNC: 21 U/L (ref 5–45)
AV AREA BY CONTINUOUS VTI: 1 CM2
AV AREA PEAK VELOCITY: 1 CM2
AV LVOT MEAN GRADIENT: 2 MMHG
AV LVOT PEAK GRADIENT: 5 MMHG
AV MEAN GRADIENT: 35 MMHG
AV PEAK GRADIENT: 68 MMHG
AV VALVE AREA: 0.97 CM2
AV VELOCITY RATIO: 0.28
BACTERIA BLD CULT: NORMAL
BASE EXCESS BLDA CALC-SCNC: 2.5 MMOL/L
BASOPHILS # BLD AUTO: 0.02 THOUSANDS/ÂΜL (ref 0–0.1)
BASOPHILS # BLD AUTO: 0.03 THOUSANDS/ÂΜL (ref 0–0.1)
BASOPHILS # BLD AUTO: 0.03 THOUSANDS/ÂΜL (ref 0–0.1)
BASOPHILS # BLD AUTO: 0.07 THOUSANDS/ÂΜL (ref 0–0.1)
BASOPHILS NFR BLD AUTO: 0 % (ref 0–1)
BASOPHILS NFR BLD AUTO: 0 % (ref 0–1)
BASOPHILS NFR BLD AUTO: 1 % (ref 0–1)
BASOPHILS NFR BLD AUTO: 1 % (ref 0–1)
BILIRUB SERPL-MCNC: 0.8 MG/DL (ref 0.2–1)
BILIRUB SERPL-MCNC: 1.34 MG/DL (ref 0.2–1)
BILIRUB SERPL-MCNC: 1.58 MG/DL (ref 0.2–1)
BILIRUB SERPL-MCNC: 1.68 MG/DL (ref 0.2–1)
BILIRUB SERPL-MCNC: 1.81 MG/DL (ref 0.2–1)
BILIRUB UR QL STRIP: NEGATIVE
BUN SERPL-MCNC: 18 MG/DL (ref 5–25)
BUN SERPL-MCNC: 19 MG/DL (ref 5–25)
BUN SERPL-MCNC: 20 MG/DL (ref 5–25)
BUN SERPL-MCNC: 25 MG/DL (ref 5–25)
BUN SERPL-MCNC: 25 MG/DL (ref 5–25)
BUN SERPL-MCNC: 26 MG/DL (ref 5–25)
BUN SERPL-MCNC: 30 MG/DL (ref 5–25)
CALCIUM ALBUM COR SERPL-MCNC: 10 MG/DL (ref 8.3–10.1)
CALCIUM ALBUM COR SERPL-MCNC: 10 MG/DL (ref 8.3–10.1)
CALCIUM ALBUM COR SERPL-MCNC: 10.1 MG/DL (ref 8.3–10.1)
CALCIUM ALBUM COR SERPL-MCNC: 10.4 MG/DL (ref 8.3–10.1)
CALCIUM ALBUM COR SERPL-MCNC: 9.9 MG/DL (ref 8.3–10.1)
CALCIUM SERPL-MCNC: 8.2 MG/DL (ref 8.3–10.1)
CALCIUM SERPL-MCNC: 8.4 MG/DL (ref 8.3–10.1)
CALCIUM SERPL-MCNC: 8.5 MG/DL (ref 8.3–10.1)
CALCIUM SERPL-MCNC: 8.8 MG/DL (ref 8.3–10.1)
CARDIAC TROPONIN I PNL SERPL HS: 83 NG/L
CARDIAC TROPONIN I PNL SERPL HS: 88 NG/L
CARDIAC TROPONIN I PNL SERPL HS: 92 NG/L (ref 8–18)
CHLORIDE SERPL-SCNC: 100 MMOL/L (ref 96–108)
CHLORIDE SERPL-SCNC: 89 MMOL/L (ref 96–108)
CHLORIDE SERPL-SCNC: 94 MMOL/L (ref 96–108)
CHLORIDE SERPL-SCNC: 96 MMOL/L (ref 96–108)
CHLORIDE SERPL-SCNC: 97 MMOL/L (ref 96–108)
CHLORIDE SERPL-SCNC: 98 MMOL/L (ref 96–108)
CLARITY UR: CLEAR
CO2 SERPL-SCNC: 29 MMOL/L (ref 21–32)
CO2 SERPL-SCNC: 30 MMOL/L (ref 21–32)
CO2 SERPL-SCNC: 31 MMOL/L (ref 21–32)
CO2 SERPL-SCNC: 32 MMOL/L (ref 21–32)
CO2 SERPL-SCNC: 33 MMOL/L (ref 21–32)
CO2 SERPL-SCNC: 33 MMOL/L (ref 21–32)
CO2 SERPL-SCNC: 34 MMOL/L (ref 21–32)
COLOR UR: NORMAL
CREAT SERPL-MCNC: 1.42 MG/DL (ref 0.6–1.3)
CREAT SERPL-MCNC: 1.51 MG/DL (ref 0.6–1.3)
CREAT SERPL-MCNC: 1.55 MG/DL (ref 0.6–1.3)
CREAT SERPL-MCNC: 1.58 MG/DL (ref 0.6–1.3)
CREAT SERPL-MCNC: 1.69 MG/DL (ref 0.6–1.3)
CREAT SERPL-MCNC: 2.01 MG/DL (ref 0.6–1.3)
CREAT SERPL-MCNC: 2.07 MG/DL (ref 0.6–1.3)
CREAT SERPL-MCNC: 2.2 MG/DL (ref 0.6–1.3)
CREAT SERPL-MCNC: 2.85 MG/DL (ref 0.6–1.3)
CRP SERPL QL: 20.3 MG/L
DOP CALC AO PEAK VEL: 4.12 M/S
DOP CALC AO VTI: 74.52 CM
DOP CALC LVOT AREA: 3.46 CM2
DOP CALC LVOT DIAMETER: 2.1 CM
DOP CALC LVOT PEAK VEL VTI: 20.94 CM
DOP CALC LVOT PEAK VEL: 1.17 M/S
DOP CALC LVOT STROKE INDEX: 31.1 ML/M2
DOP CALC LVOT STROKE VOLUME: 72.49 CM3
E WAVE DECELERATION TIME: 245 MS
EOSINOPHIL # BLD AUTO: 0.04 THOUSAND/ÂΜL (ref 0–0.61)
EOSINOPHIL # BLD AUTO: 0.06 THOUSAND/ÂΜL (ref 0–0.61)
EOSINOPHIL NFR BLD AUTO: 0 % (ref 0–6)
EOSINOPHIL NFR BLD AUTO: 1 % (ref 0–6)
EOSINOPHIL NFR FLD MANUAL: 1 %
ERYTHROCYTE [DISTWIDTH] IN BLOOD BY AUTOMATED COUNT: 18.8 % (ref 11.6–15.1)
ERYTHROCYTE [DISTWIDTH] IN BLOOD BY AUTOMATED COUNT: 18.9 % (ref 11.6–15.1)
ERYTHROCYTE [DISTWIDTH] IN BLOOD BY AUTOMATED COUNT: 19 % (ref 11.6–15.1)
ERYTHROCYTE [DISTWIDTH] IN BLOOD BY AUTOMATED COUNT: 19 % (ref 11.6–15.1)
ERYTHROCYTE [DISTWIDTH] IN BLOOD BY AUTOMATED COUNT: 19.1 % (ref 11.6–15.1)
ERYTHROCYTE [DISTWIDTH] IN BLOOD BY AUTOMATED COUNT: 19.1 % (ref 11.6–15.1)
ERYTHROCYTE [DISTWIDTH] IN BLOOD BY AUTOMATED COUNT: 19.2 % (ref 11.6–15.1)
ERYTHROCYTE [SEDIMENTATION RATE] IN BLOOD: 56 MM/HOUR (ref 0–19)
EST. AVERAGE GLUCOSE BLD GHB EST-MCNC: 114 MG/DL
FLUAV RNA RESP QL NAA+PROBE: NEGATIVE
FLUBV RNA RESP QL NAA+PROBE: NEGATIVE
FRACTIONAL SHORTENING: 25 % (ref 28–44)
GFR SERPL CREATININE-BSD FRML MDRD: 19 ML/MIN/1.73SQ M
GFR SERPL CREATININE-BSD FRML MDRD: 26 ML/MIN/1.73SQ M
GFR SERPL CREATININE-BSD FRML MDRD: 28 ML/MIN/1.73SQ M
GFR SERPL CREATININE-BSD FRML MDRD: 30 ML/MIN/1.73SQ M
GFR SERPL CREATININE-BSD FRML MDRD: 37 ML/MIN/1.73SQ M
GFR SERPL CREATININE-BSD FRML MDRD: 40 ML/MIN/1.73SQ M
GFR SERPL CREATININE-BSD FRML MDRD: 41 ML/MIN/1.73SQ M
GFR SERPL CREATININE-BSD FRML MDRD: 42 ML/MIN/1.73SQ M
GFR SERPL CREATININE-BSD FRML MDRD: 45 ML/MIN/1.73SQ M
GLUCOSE FLD-MCNC: 167 MG/DL
GLUCOSE SERPL-MCNC: 121 MG/DL (ref 65–140)
GLUCOSE SERPL-MCNC: 126 MG/DL (ref 65–140)
GLUCOSE SERPL-MCNC: 141 MG/DL (ref 65–140)
GLUCOSE SERPL-MCNC: 141 MG/DL (ref 65–140)
GLUCOSE SERPL-MCNC: 144 MG/DL (ref 65–140)
GLUCOSE SERPL-MCNC: 146 MG/DL (ref 65–140)
GLUCOSE SERPL-MCNC: 158 MG/DL (ref 65–140)
GLUCOSE SERPL-MCNC: 159 MG/DL (ref 65–140)
GLUCOSE SERPL-MCNC: 159 MG/DL (ref 65–140)
GLUCOSE SERPL-MCNC: 163 MG/DL (ref 65–140)
GLUCOSE SERPL-MCNC: 171 MG/DL (ref 65–140)
GLUCOSE UR STRIP-MCNC: NEGATIVE MG/DL
HBA1C MFR BLD: 5.6 %
HCO3 BLDA-SCNC: 26 MMOL/L (ref 22–28)
HCT VFR BLD AUTO: 26.1 % (ref 36.5–49.3)
HCT VFR BLD AUTO: 27.2 % (ref 36.5–49.3)
HCT VFR BLD AUTO: 27.3 % (ref 36.5–49.3)
HCT VFR BLD AUTO: 28.6 % (ref 36.5–49.3)
HCT VFR BLD AUTO: 29.2 % (ref 36.5–49.3)
HCT VFR BLD AUTO: 29.8 % (ref 36.5–49.3)
HCT VFR BLD AUTO: 30.4 % (ref 36.5–49.3)
HCT VFR BLD AUTO: 30.4 % (ref 36.5–49.3)
HCT VFR BLD AUTO: 30.5 % (ref 36.5–49.3)
HGB BLD-MCNC: 8.1 G/DL (ref 12–17)
HGB BLD-MCNC: 8.3 G/DL (ref 12–17)
HGB BLD-MCNC: 8.3 G/DL (ref 12–17)
HGB BLD-MCNC: 8.4 G/DL (ref 12–17)
HGB BLD-MCNC: 8.7 G/DL (ref 12–17)
HGB BLD-MCNC: 8.7 G/DL (ref 12–17)
HGB BLD-MCNC: 8.8 G/DL (ref 12–17)
HGB BLD-MCNC: 9.1 G/DL (ref 12–17)
HGB BLD-MCNC: 9.2 G/DL (ref 12–17)
HGB UR QL STRIP.AUTO: NEGATIVE
IMM GRANULOCYTES # BLD AUTO: 0.04 THOUSAND/UL (ref 0–0.2)
IMM GRANULOCYTES # BLD AUTO: 0.07 THOUSAND/UL (ref 0–0.2)
IMM GRANULOCYTES # BLD AUTO: 0.08 THOUSAND/UL (ref 0–0.2)
IMM GRANULOCYTES # BLD AUTO: 0.1 THOUSAND/UL (ref 0–0.2)
IMM GRANULOCYTES NFR BLD AUTO: 1 % (ref 0–2)
INTERVENTRICULAR SEPTUM IN DIASTOLE (PARASTERNAL SHORT AXIS VIEW): 1.7 CM
INTERVENTRICULAR SEPTUM: 1.7 CM (ref 0.6–1.1)
KETONES UR STRIP-MCNC: NEGATIVE MG/DL
LAAS-AP2: 36.6 CM2
LAAS-AP4: 41.8 CM2
LACTATE SERPL-SCNC: 1.4 MMOL/L (ref 0.5–2)
LDH FLD L TO P-CCNC: 138 U/L
LEFT ATRIUM AREA SYSTOLE SINGLE PLANE A4C: 40.3 CM2
LEFT ATRIUM SIZE: 4.8 CM
LEFT INTERNAL DIMENSION IN SYSTOLE: 3.3 CM (ref 2.1–4)
LEFT VENTRICULAR INTERNAL DIMENSION IN DIASTOLE: 4.4 CM (ref 3.5–6)
LEFT VENTRICULAR POSTERIOR WALL IN END DIASTOLE: 1.7 CM
LEFT VENTRICULAR STROKE VOLUME: 46 ML
LEUKOCYTE ESTERASE UR QL STRIP: NEGATIVE
LVSV (TEICH): 46 ML
LYMPHOCYTES # BLD AUTO: 0.72 THOUSANDS/ÂΜL (ref 0.6–4.47)
LYMPHOCYTES # BLD AUTO: 0.79 THOUSANDS/ÂΜL (ref 0.6–4.47)
LYMPHOCYTES # BLD AUTO: 0.83 THOUSANDS/ÂΜL (ref 0.6–4.47)
LYMPHOCYTES # BLD AUTO: 1.48 THOUSANDS/ÂΜL (ref 0.6–4.47)
LYMPHOCYTES NFR BLD AUTO: 11 % (ref 14–44)
LYMPHOCYTES NFR BLD AUTO: 12 % (ref 14–44)
LYMPHOCYTES NFR BLD AUTO: 48 %
MAGNESIUM SERPL-MCNC: 1.4 MG/DL (ref 1.6–2.6)
MAGNESIUM SERPL-MCNC: 1.8 MG/DL (ref 1.6–2.6)
MAGNESIUM SERPL-MCNC: 1.9 MG/DL (ref 1.6–2.6)
MAGNESIUM SERPL-MCNC: 2 MG/DL (ref 1.6–2.6)
MAGNESIUM SERPL-MCNC: 2.1 MG/DL (ref 1.6–2.6)
MCH RBC QN AUTO: 26 PG (ref 26.8–34.3)
MCH RBC QN AUTO: 26.2 PG (ref 26.8–34.3)
MCH RBC QN AUTO: 26.2 PG (ref 26.8–34.3)
MCH RBC QN AUTO: 26.3 PG (ref 26.8–34.3)
MCH RBC QN AUTO: 26.4 PG (ref 26.8–34.3)
MCH RBC QN AUTO: 26.4 PG (ref 26.8–34.3)
MCH RBC QN AUTO: 26.7 PG (ref 26.8–34.3)
MCH RBC QN AUTO: 26.7 PG (ref 26.8–34.3)
MCH RBC QN AUTO: 26.8 PG (ref 26.8–34.3)
MCHC RBC AUTO-ENTMCNC: 28.6 G/DL (ref 31.4–37.4)
MCHC RBC AUTO-ENTMCNC: 28.8 G/DL (ref 31.4–37.4)
MCHC RBC AUTO-ENTMCNC: 29.5 G/DL (ref 31.4–37.4)
MCHC RBC AUTO-ENTMCNC: 29.8 G/DL (ref 31.4–37.4)
MCHC RBC AUTO-ENTMCNC: 30.3 G/DL (ref 31.4–37.4)
MCHC RBC AUTO-ENTMCNC: 30.4 G/DL (ref 31.4–37.4)
MCHC RBC AUTO-ENTMCNC: 30.4 G/DL (ref 31.4–37.4)
MCHC RBC AUTO-ENTMCNC: 30.5 G/DL (ref 31.4–37.4)
MCHC RBC AUTO-ENTMCNC: 31 G/DL (ref 31.4–37.4)
MCV RBC AUTO: 86 FL (ref 82–98)
MCV RBC AUTO: 87 FL (ref 82–98)
MCV RBC AUTO: 87 FL (ref 82–98)
MCV RBC AUTO: 88 FL (ref 82–98)
MCV RBC AUTO: 89 FL (ref 82–98)
MCV RBC AUTO: 91 FL (ref 82–98)
MCV RBC AUTO: 91 FL (ref 82–98)
MONO+MESO NFR FLD MANUAL: 3 %
MONOCYTES # BLD AUTO: 0.46 THOUSAND/ÂΜL (ref 0.17–1.22)
MONOCYTES # BLD AUTO: 0.49 THOUSAND/ÂΜL (ref 0.17–1.22)
MONOCYTES # BLD AUTO: 0.57 THOUSAND/ÂΜL (ref 0.17–1.22)
MONOCYTES # BLD AUTO: 0.83 THOUSAND/ÂΜL (ref 0.17–1.22)
MONOCYTES NFR BLD AUTO: 23 %
MONOCYTES NFR BLD AUTO: 7 % (ref 4–12)
MONOCYTES NFR BLD AUTO: 7 % (ref 4–12)
MONOCYTES NFR BLD AUTO: 8 % (ref 4–12)
MONOCYTES NFR BLD AUTO: 8 % (ref 4–12)
MV E'TISSUE VEL-LAT: 7 CM/S
MV E'TISSUE VEL-SEP: 6 CM/S
MV PEAK A VEL: 1.18 M/S
MV PEAK E VEL: 193 CM/S
MV STENOSIS PRESSURE HALF TIME: 71 MS
MV VALVE AREA P 1/2 METHOD: 3.1 CM2
NASAL CANNULA: 5
NEUTROPHILS # BLD AUTO: 5.21 THOUSANDS/ÂΜL (ref 1.85–7.62)
NEUTROPHILS # BLD AUTO: 5.21 THOUSANDS/ÂΜL (ref 1.85–7.62)
NEUTROPHILS # BLD AUTO: 5.48 THOUSANDS/ÂΜL (ref 1.85–7.62)
NEUTROPHILS # BLD AUTO: 9.48 THOUSANDS/ÂΜL (ref 1.85–7.62)
NEUTS SEG NFR BLD AUTO: 25 %
NEUTS SEG NFR BLD AUTO: 78 % (ref 43–75)
NEUTS SEG NFR BLD AUTO: 78 % (ref 43–75)
NEUTS SEG NFR BLD AUTO: 79 % (ref 43–75)
NEUTS SEG NFR BLD AUTO: 79 % (ref 43–75)
NITRITE UR QL STRIP: NEGATIVE
NRBC BLD AUTO-RTO: 0 /100 WBCS
O2 CT BLDA-SCNC: 14 ML/DL (ref 16–23)
OXYHGB MFR BLDA: 95.7 % (ref 94–97)
PCO2 BLDA: 35.9 MM HG (ref 36–44)
PH BLDA: 7.48 [PH] (ref 7.35–7.45)
PH BODY FLUID: 7.8
PH UR STRIP.AUTO: 7 [PH]
PLATELET # BLD AUTO: 180 THOUSANDS/UL (ref 149–390)
PLATELET # BLD AUTO: 190 THOUSANDS/UL (ref 149–390)
PLATELET # BLD AUTO: 190 THOUSANDS/UL (ref 149–390)
PLATELET # BLD AUTO: 195 THOUSANDS/UL (ref 149–390)
PLATELET # BLD AUTO: 201 THOUSANDS/UL (ref 149–390)
PLATELET # BLD AUTO: 202 THOUSANDS/UL (ref 149–390)
PLATELET # BLD AUTO: 207 THOUSANDS/UL (ref 149–390)
PLATELET # BLD AUTO: 221 THOUSANDS/UL (ref 149–390)
PLATELET # BLD AUTO: 229 THOUSANDS/UL (ref 149–390)
PMV BLD AUTO: 8.3 FL (ref 8.9–12.7)
PMV BLD AUTO: 8.5 FL (ref 8.9–12.7)
PMV BLD AUTO: 8.6 FL (ref 8.9–12.7)
PMV BLD AUTO: 8.8 FL (ref 8.9–12.7)
PMV BLD AUTO: 8.9 FL (ref 8.9–12.7)
PMV BLD AUTO: 9 FL (ref 8.9–12.7)
PMV BLD AUTO: 9 FL (ref 8.9–12.7)
PMV BLD AUTO: 9.1 FL (ref 8.9–12.7)
PMV BLD AUTO: 9.1 FL (ref 8.9–12.7)
PO2 BLDA: 92.2 MM HG (ref 75–129)
POTASSIUM SERPL-SCNC: 2.6 MMOL/L (ref 3.5–5.3)
POTASSIUM SERPL-SCNC: 2.9 MMOL/L (ref 3.5–5.3)
POTASSIUM SERPL-SCNC: 2.9 MMOL/L (ref 3.5–5.3)
POTASSIUM SERPL-SCNC: 3.1 MMOL/L (ref 3.5–5.3)
POTASSIUM SERPL-SCNC: 3.2 MMOL/L (ref 3.5–5.3)
POTASSIUM SERPL-SCNC: 3.3 MMOL/L (ref 3.5–5.3)
POTASSIUM SERPL-SCNC: 3.5 MMOL/L (ref 3.5–5.3)
POTASSIUM SERPL-SCNC: 3.6 MMOL/L (ref 3.5–5.3)
POTASSIUM SERPL-SCNC: 4 MMOL/L (ref 3.5–5.3)
PROT FLD-MCNC: <2 G/DL
PROT SERPL-MCNC: 6.9 G/DL (ref 6.4–8.4)
PROT SERPL-MCNC: 7.1 G/DL (ref 6.4–8.4)
PROT SERPL-MCNC: 7.2 G/DL (ref 6.4–8.4)
PROT SERPL-MCNC: 7.2 G/DL (ref 6.4–8.4)
PROT SERPL-MCNC: 7.3 G/DL (ref 6.4–8.4)
PROT UR STRIP-MCNC: NEGATIVE MG/DL
QRS AXIS: 30 DEGREES
QRSD INTERVAL: 106 MS
QT INTERVAL: 402 MS
QTC INTERVAL: 452 MS
RBC # BLD AUTO: 3.03 MILLION/UL (ref 3.88–5.62)
RBC # BLD AUTO: 3.1 MILLION/UL (ref 3.88–5.62)
RBC # BLD AUTO: 3.14 MILLION/UL (ref 3.88–5.62)
RBC # BLD AUTO: 3.21 MILLION/UL (ref 3.88–5.62)
RBC # BLD AUTO: 3.29 MILLION/UL (ref 3.88–5.62)
RBC # BLD AUTO: 3.35 MILLION/UL (ref 3.88–5.62)
RBC # BLD AUTO: 3.35 MILLION/UL (ref 3.88–5.62)
RBC # BLD AUTO: 3.45 MILLION/UL (ref 3.88–5.62)
RBC # BLD AUTO: 3.47 MILLION/UL (ref 3.88–5.62)
RIGHT ATRIUM AREA SYSTOLE A4C: 21.5 CM2
RIGHT VENTRICLE ID DIMENSION: 3.3 CM
RSV RNA RESP QL NAA+PROBE: NEGATIVE
SARS-COV-2 RNA RESP QL NAA+PROBE: NEGATIVE
SITE: NORMAL
SL CV LEFT ATRIUM LENGTH A2C: 7 CM
SL CV LV EF: 55
SL CV PED ECHO LEFT VENTRICLE DIASTOLIC VOLUME (MOD BIPLANE) 2D: 90 ML
SL CV PED ECHO LEFT VENTRICLE SYSTOLIC VOLUME (MOD BIPLANE) 2D: 44 ML
SODIUM SERPL-SCNC: 130 MMOL/L (ref 135–147)
SODIUM SERPL-SCNC: 134 MMOL/L (ref 135–147)
SODIUM SERPL-SCNC: 135 MMOL/L (ref 135–147)
SODIUM SERPL-SCNC: 135 MMOL/L (ref 135–147)
SODIUM SERPL-SCNC: 136 MMOL/L (ref 135–147)
SODIUM SERPL-SCNC: 137 MMOL/L (ref 135–147)
SODIUM SERPL-SCNC: 137 MMOL/L (ref 135–147)
SODIUM SERPL-SCNC: 138 MMOL/L (ref 135–147)
SODIUM SERPL-SCNC: 138 MMOL/L (ref 135–147)
SP GR UR STRIP.AUTO: 1.01 (ref 1–1.03)
SPECIMEN SOURCE: ABNORMAL
T WAVE AXIS: 199 DEGREES
TOTAL CELLS COUNTED SPEC: 100
TR MAX PG: 75 MMHG
TR PEAK VELOCITY: 4.3 M/S
TRICUSPID ANNULAR PLANE SYSTOLIC EXCURSION: 1.3 CM
TRICUSPID VALVE PEAK REGURGITATION VELOCITY: 4.32 M/S
UROBILINOGEN UR QL STRIP.AUTO: 0.2 E.U./DL
VENTRICULAR RATE: 76 BPM
WBC # BLD AUTO: 12 THOUSAND/UL (ref 4.31–10.16)
WBC # BLD AUTO: 6.53 THOUSAND/UL (ref 4.31–10.16)
WBC # BLD AUTO: 6.77 THOUSAND/UL (ref 4.31–10.16)
WBC # BLD AUTO: 6.87 THOUSAND/UL (ref 4.31–10.16)
WBC # BLD AUTO: 7.69 THOUSAND/UL (ref 4.31–10.16)
WBC # BLD AUTO: 7.91 THOUSAND/UL (ref 4.31–10.16)
WBC # BLD AUTO: 8.05 THOUSAND/UL (ref 4.31–10.16)
WBC # BLD AUTO: 8.14 THOUSAND/UL (ref 4.31–10.16)
WBC # BLD AUTO: 8.97 THOUSAND/UL (ref 4.31–10.16)
WBC # FLD MANUAL: 754 /UL

## 2023-01-01 PROCEDURE — 0W993ZZ DRAINAGE OF RIGHT PLEURAL CAVITY, PERCUTANEOUS APPROACH: ICD-10-PCS | Performed by: RADIOLOGY

## 2023-01-01 RX ORDER — METRONIDAZOLE 500 MG/1
500 TABLET ORAL EVERY 8 HOURS SCHEDULED
Status: DISCONTINUED | OUTPATIENT
Start: 2023-01-01 | End: 2023-01-01

## 2023-01-01 RX ORDER — LEVALBUTEROL 1.25 MG/.5ML
1.25 SOLUTION, CONCENTRATE RESPIRATORY (INHALATION)
Status: DISCONTINUED | OUTPATIENT
Start: 2023-01-01 | End: 2023-01-01

## 2023-01-01 RX ORDER — SODIUM CHLORIDE 9 MG/ML
20 INJECTION, SOLUTION INTRAVENOUS CONTINUOUS
Status: DISCONTINUED | OUTPATIENT
Start: 2023-01-01 | End: 2023-01-01 | Stop reason: HOSPADM

## 2023-01-01 RX ORDER — TORSEMIDE 20 MG/1
40 TABLET ORAL DAILY
Status: CANCELLED | OUTPATIENT
Start: 2023-01-01

## 2023-01-01 RX ORDER — SENNOSIDES 8.6 MG
8.6 TABLET ORAL
Status: DISCONTINUED | OUTPATIENT
Start: 2023-01-01 | End: 2023-01-01

## 2023-01-01 RX ORDER — SENNOSIDES 8.6 MG
8.6 TABLET ORAL
Status: DISCONTINUED | OUTPATIENT
Start: 2023-01-01 | End: 2023-01-01 | Stop reason: HOSPADM

## 2023-01-01 RX ORDER — ACETYLCYSTEINE 200 MG/ML
3 SOLUTION ORAL; RESPIRATORY (INHALATION)
Status: DISCONTINUED | OUTPATIENT
Start: 2023-01-01 | End: 2023-01-01

## 2023-01-01 RX ORDER — POTASSIUM CHLORIDE 20 MEQ/1
40 TABLET, EXTENDED RELEASE ORAL ONCE
Status: COMPLETED | OUTPATIENT
Start: 2023-01-01 | End: 2023-01-01

## 2023-01-01 RX ORDER — TORSEMIDE 20 MG/1
40 TABLET ORAL DAILY
Status: DISCONTINUED | OUTPATIENT
Start: 2023-01-01 | End: 2023-01-01

## 2023-01-01 RX ORDER — LEVALBUTEROL 1.25 MG/.5ML
1.25 SOLUTION, CONCENTRATE RESPIRATORY (INHALATION)
Status: CANCELLED | OUTPATIENT
Start: 2023-01-01

## 2023-01-01 RX ORDER — SCOLOPAMINE TRANSDERMAL SYSTEM 1 MG/1
1 PATCH, EXTENDED RELEASE TRANSDERMAL
Status: DISCONTINUED | OUTPATIENT
Start: 2023-01-01 | End: 2023-01-01

## 2023-01-01 RX ORDER — HYDROMORPHONE HCL/PF 1 MG/ML
0.5 SYRINGE (ML) INJECTION EVERY 2 HOUR PRN
Status: DISCONTINUED | OUTPATIENT
Start: 2023-01-01 | End: 2023-01-01

## 2023-01-01 RX ORDER — CEFAZOLIN SODIUM 2 G/50ML
2000 SOLUTION INTRAVENOUS EVERY 8 HOURS
Status: DISCONTINUED | OUTPATIENT
Start: 2023-01-01 | End: 2023-01-01

## 2023-01-01 RX ORDER — ACETAMINOPHEN 325 MG/1
650 TABLET ORAL EVERY 6 HOURS PRN
Status: CANCELLED | OUTPATIENT
Start: 2023-01-01

## 2023-01-01 RX ORDER — DULOXETIN HYDROCHLORIDE 60 MG/1
60 CAPSULE, DELAYED RELEASE ORAL 2 TIMES DAILY
Status: CANCELLED | OUTPATIENT
Start: 2023-01-01

## 2023-01-01 RX ORDER — PANTOPRAZOLE SODIUM 40 MG/1
40 TABLET, DELAYED RELEASE ORAL
Status: DISCONTINUED | OUTPATIENT
Start: 2023-01-01 | End: 2023-01-01 | Stop reason: HOSPADM

## 2023-01-01 RX ORDER — CEFTRIAXONE 2 G/50ML
2000 INJECTION, SOLUTION INTRAVENOUS EVERY 24 HOURS
Status: DISCONTINUED | OUTPATIENT
Start: 2023-01-01 | End: 2023-01-01

## 2023-01-01 RX ORDER — FLUTICASONE FUROATE AND VILANTEROL 200; 25 UG/1; UG/1
1 POWDER RESPIRATORY (INHALATION) DAILY
Status: CANCELLED | OUTPATIENT
Start: 2023-01-01

## 2023-01-01 RX ORDER — FINASTERIDE 5 MG/1
5 TABLET, FILM COATED ORAL DAILY
Status: CANCELLED | OUTPATIENT
Start: 2023-01-01

## 2023-01-01 RX ORDER — GLYCOPYRROLATE 0.2 MG/ML
0.1 INJECTION INTRAMUSCULAR; INTRAVENOUS EVERY 4 HOURS PRN
Status: DISCONTINUED | OUTPATIENT
Start: 2023-01-01 | End: 2023-01-01

## 2023-01-01 RX ORDER — ACETAMINOPHEN 325 MG/1
650 TABLET ORAL EVERY 6 HOURS PRN
Status: DISCONTINUED | OUTPATIENT
Start: 2023-01-01 | End: 2023-01-01 | Stop reason: HOSPADM

## 2023-01-01 RX ORDER — TORSEMIDE 20 MG/1
40 TABLET ORAL DAILY
Status: DISCONTINUED | OUTPATIENT
Start: 2023-01-01 | End: 2023-01-01 | Stop reason: HOSPADM

## 2023-01-01 RX ORDER — ACETYLCYSTEINE 200 MG/ML
3 SOLUTION ORAL; RESPIRATORY (INHALATION)
Status: CANCELLED | OUTPATIENT
Start: 2023-01-01

## 2023-01-01 RX ORDER — GLYCOPYRROLATE 0.2 MG/ML
0.1 INJECTION INTRAMUSCULAR; INTRAVENOUS
Status: DISCONTINUED | OUTPATIENT
Start: 2023-01-01 | End: 2023-01-01 | Stop reason: HOSPADM

## 2023-01-01 RX ORDER — SODIUM CHLORIDE 9 MG/ML
10 INJECTION INTRAVENOUS EVERY 8 HOURS SCHEDULED
Status: DISCONTINUED | OUTPATIENT
Start: 2023-01-01 | End: 2023-01-01 | Stop reason: HOSPADM

## 2023-01-01 RX ORDER — POTASSIUM CHLORIDE 20 MEQ/1
20 TABLET, EXTENDED RELEASE ORAL ONCE
Status: COMPLETED | OUTPATIENT
Start: 2023-01-01 | End: 2023-01-01

## 2023-01-01 RX ORDER — MAGNESIUM SULFATE HEPTAHYDRATE 40 MG/ML
2 INJECTION, SOLUTION INTRAVENOUS ONCE
Status: COMPLETED | OUTPATIENT
Start: 2023-01-01 | End: 2023-01-01

## 2023-01-01 RX ORDER — SODIUM CHLORIDE 9 MG/ML
10 INJECTION INTRAVENOUS EVERY 8 HOURS SCHEDULED
Status: CANCELLED | OUTPATIENT
Start: 2023-01-01

## 2023-01-01 RX ORDER — ALLOPURINOL 100 MG/1
200 TABLET ORAL DAILY
Status: CANCELLED | OUTPATIENT
Start: 2023-01-01

## 2023-01-01 RX ORDER — LORAZEPAM 2 MG/ML
1 INJECTION INTRAMUSCULAR ONCE
Status: COMPLETED | OUTPATIENT
Start: 2023-01-01 | End: 2023-01-01

## 2023-01-01 RX ORDER — DULOXETIN HYDROCHLORIDE 60 MG/1
60 CAPSULE, DELAYED RELEASE ORAL 2 TIMES DAILY
Status: DISCONTINUED | OUTPATIENT
Start: 2023-01-01 | End: 2023-01-01

## 2023-01-01 RX ORDER — CEFAZOLIN SODIUM 2 G/50ML
2000 SOLUTION INTRAVENOUS ONCE
Status: COMPLETED | OUTPATIENT
Start: 2023-01-01 | End: 2023-01-01

## 2023-01-01 RX ORDER — SODIUM CHLORIDE, SODIUM LACTATE, POTASSIUM CHLORIDE, CALCIUM CHLORIDE 600; 310; 30; 20 MG/100ML; MG/100ML; MG/100ML; MG/100ML
125 INJECTION, SOLUTION INTRAVENOUS CONTINUOUS
Status: DISCONTINUED | OUTPATIENT
Start: 2023-01-01 | End: 2023-01-01

## 2023-01-01 RX ORDER — POTASSIUM CHLORIDE 14.9 MG/ML
20 INJECTION INTRAVENOUS ONCE
Status: COMPLETED | OUTPATIENT
Start: 2023-01-01 | End: 2023-01-01

## 2023-01-01 RX ORDER — SACCHAROMYCES BOULARDII 250 MG
250 CAPSULE ORAL 2 TIMES DAILY
Status: DISCONTINUED | OUTPATIENT
Start: 2023-01-01 | End: 2023-01-01 | Stop reason: HOSPADM

## 2023-01-01 RX ORDER — DOCUSATE SODIUM 100 MG/1
100 CAPSULE, LIQUID FILLED ORAL 2 TIMES DAILY
Status: CANCELLED | OUTPATIENT
Start: 2023-01-01

## 2023-01-01 RX ORDER — POTASSIUM CHLORIDE 14.9 MG/ML
20 INJECTION INTRAVENOUS
Status: DISCONTINUED | OUTPATIENT
Start: 2023-01-01 | End: 2023-01-01

## 2023-01-01 RX ORDER — SENNOSIDES 8.6 MG
8.6 TABLET ORAL
Status: CANCELLED | OUTPATIENT
Start: 2023-01-01

## 2023-01-01 RX ORDER — FUROSEMIDE 10 MG/ML
20 INJECTION INTRAMUSCULAR; INTRAVENOUS ONCE
Status: COMPLETED | OUTPATIENT
Start: 2023-01-01 | End: 2023-01-01

## 2023-01-01 RX ORDER — PANTOPRAZOLE SODIUM 40 MG/1
40 TABLET, DELAYED RELEASE ORAL
Status: DISCONTINUED | OUTPATIENT
Start: 2023-01-01 | End: 2023-01-01

## 2023-01-01 RX ORDER — LORAZEPAM 2 MG/ML
1 INJECTION INTRAMUSCULAR
Status: DISCONTINUED | OUTPATIENT
Start: 2023-01-01 | End: 2023-01-01 | Stop reason: HOSPADM

## 2023-01-01 RX ORDER — ALLOPURINOL 100 MG/1
200 TABLET ORAL DAILY
Status: DISCONTINUED | OUTPATIENT
Start: 2023-01-01 | End: 2023-01-01

## 2023-01-01 RX ORDER — DOCUSATE SODIUM 100 MG/1
100 CAPSULE, LIQUID FILLED ORAL 2 TIMES DAILY
Status: DISCONTINUED | OUTPATIENT
Start: 2023-01-01 | End: 2023-01-01

## 2023-01-01 RX ORDER — FLUTICASONE FUROATE AND VILANTEROL 200; 25 UG/1; UG/1
1 POWDER RESPIRATORY (INHALATION) DAILY
Status: DISCONTINUED | OUTPATIENT
Start: 2023-01-01 | End: 2023-01-01

## 2023-01-01 RX ORDER — CEFAZOLIN SODIUM 10 G/1
INJECTION, POWDER, FOR SOLUTION INTRAVENOUS
COMMUNITY
Start: 2022-12-29 | End: 2023-01-01

## 2023-01-01 RX ORDER — GLYCOPYRROLATE 0.2 MG/ML
0.1 INJECTION INTRAMUSCULAR; INTRAVENOUS ONCE
Status: COMPLETED | OUTPATIENT
Start: 2023-01-01 | End: 2023-01-01

## 2023-01-01 RX ORDER — PANTOPRAZOLE SODIUM 40 MG/1
40 TABLET, DELAYED RELEASE ORAL
Status: CANCELLED | OUTPATIENT
Start: 2023-01-01

## 2023-01-01 RX ORDER — SACCHAROMYCES BOULARDII 250 MG
250 CAPSULE ORAL 2 TIMES DAILY
Status: CANCELLED | OUTPATIENT
Start: 2023-01-01 | End: 2023-01-01

## 2023-01-01 RX ORDER — POLYETHYLENE GLYCOL 3350 17 G/17G
17 POWDER, FOR SOLUTION ORAL DAILY
Status: DISCONTINUED | OUTPATIENT
Start: 2023-01-01 | End: 2023-01-01 | Stop reason: HOSPADM

## 2023-01-01 RX ORDER — TRAMADOL HYDROCHLORIDE 50 MG/1
50 TABLET ORAL ONCE
Status: COMPLETED | OUTPATIENT
Start: 2023-01-01 | End: 2023-01-01

## 2023-01-01 RX ORDER — FLUTICASONE FUROATE AND VILANTEROL 200; 25 UG/1; UG/1
1 POWDER RESPIRATORY (INHALATION) DAILY
Status: DISCONTINUED | OUTPATIENT
Start: 2023-01-01 | End: 2023-01-01 | Stop reason: HOSPADM

## 2023-01-01 RX ORDER — DOCUSATE SODIUM 100 MG/1
100 CAPSULE, LIQUID FILLED ORAL 2 TIMES DAILY
Status: DISCONTINUED | OUTPATIENT
Start: 2023-01-01 | End: 2023-01-01 | Stop reason: HOSPADM

## 2023-01-01 RX ORDER — POLYETHYLENE GLYCOL 3350 17 G/17G
17 POWDER, FOR SOLUTION ORAL DAILY
Status: DISCONTINUED | OUTPATIENT
Start: 2023-01-01 | End: 2023-01-01

## 2023-01-01 RX ORDER — POTASSIUM CHLORIDE 20MEQ/15ML
40 LIQUID (ML) ORAL DAILY
Status: COMPLETED | OUTPATIENT
Start: 2023-01-01 | End: 2023-01-01

## 2023-01-01 RX ORDER — LORAZEPAM 2 MG/ML
0.5 INJECTION INTRAMUSCULAR EVERY 4 HOURS PRN
Status: DISCONTINUED | OUTPATIENT
Start: 2023-01-01 | End: 2023-01-01

## 2023-01-01 RX ORDER — FINASTERIDE 5 MG/1
5 TABLET, FILM COATED ORAL DAILY
Status: DISCONTINUED | OUTPATIENT
Start: 2023-01-01 | End: 2023-01-01

## 2023-01-01 RX ORDER — SACCHAROMYCES BOULARDII 250 MG
250 CAPSULE ORAL 2 TIMES DAILY
Status: DISPENSED | OUTPATIENT
Start: 2023-01-01 | End: 2023-01-01

## 2023-01-01 RX ORDER — LEVALBUTEROL 1.25 MG/.5ML
1.25 SOLUTION, CONCENTRATE RESPIRATORY (INHALATION)
Status: DISCONTINUED | OUTPATIENT
Start: 2023-01-01 | End: 2023-01-01 | Stop reason: HOSPADM

## 2023-01-01 RX ORDER — FINASTERIDE 5 MG/1
5 TABLET, FILM COATED ORAL DAILY
Status: DISCONTINUED | OUTPATIENT
Start: 2023-01-01 | End: 2023-01-01 | Stop reason: HOSPADM

## 2023-01-01 RX ORDER — FUROSEMIDE 10 MG/ML
40 INJECTION INTRAMUSCULAR; INTRAVENOUS ONCE
Status: COMPLETED | OUTPATIENT
Start: 2023-01-01 | End: 2023-01-01

## 2023-01-01 RX ORDER — ALLOPURINOL 100 MG/1
200 TABLET ORAL DAILY
Status: DISCONTINUED | OUTPATIENT
Start: 2023-01-01 | End: 2023-01-01 | Stop reason: HOSPADM

## 2023-01-01 RX ORDER — LORAZEPAM 2 MG/ML
1 INJECTION INTRAMUSCULAR EVERY 2 HOUR PRN
Status: DISCONTINUED | OUTPATIENT
Start: 2023-01-01 | End: 2023-01-01

## 2023-01-01 RX ORDER — ACETYLCYSTEINE 200 MG/ML
3 SOLUTION ORAL; RESPIRATORY (INHALATION)
Status: DISCONTINUED | OUTPATIENT
Start: 2023-01-01 | End: 2023-01-01 | Stop reason: HOSPADM

## 2023-01-01 RX ORDER — SODIUM CHLORIDE, SODIUM LACTATE, POTASSIUM CHLORIDE, CALCIUM CHLORIDE 600; 310; 30; 20 MG/100ML; MG/100ML; MG/100ML; MG/100ML
50 INJECTION, SOLUTION INTRAVENOUS CONTINUOUS
Status: DISCONTINUED | OUTPATIENT
Start: 2023-01-01 | End: 2023-01-01

## 2023-01-01 RX ORDER — DULOXETIN HYDROCHLORIDE 60 MG/1
60 CAPSULE, DELAYED RELEASE ORAL 2 TIMES DAILY
Status: DISCONTINUED | OUTPATIENT
Start: 2023-01-01 | End: 2023-01-01 | Stop reason: HOSPADM

## 2023-01-01 RX ORDER — POLYETHYLENE GLYCOL 3350 17 G/17G
17 POWDER, FOR SOLUTION ORAL DAILY PRN
Status: DISCONTINUED | OUTPATIENT
Start: 2023-01-01 | End: 2023-01-01 | Stop reason: HOSPADM

## 2023-01-01 RX ORDER — POLYETHYLENE GLYCOL 3350 17 G/17G
17 POWDER, FOR SOLUTION ORAL DAILY
Status: CANCELLED | OUTPATIENT
Start: 2023-01-01

## 2023-01-01 RX ADMIN — SODIUM CHLORIDE 10 ML: 9 INJECTION, SOLUTION INTRAMUSCULAR; INTRAVENOUS; SUBCUTANEOUS at 21:43

## 2023-01-01 RX ADMIN — Medication 250 MG: at 17:48

## 2023-01-01 RX ADMIN — LORAZEPAM 1 MG: 2 INJECTION INTRAMUSCULAR; INTRAVENOUS at 10:21

## 2023-01-01 RX ADMIN — DULOXETINE HYDROCHLORIDE 60 MG: 60 CAPSULE, DELAYED RELEASE ORAL at 18:33

## 2023-01-01 RX ADMIN — FUROSEMIDE 20 MG: 10 INJECTION, SOLUTION INTRAMUSCULAR; INTRAVENOUS at 17:59

## 2023-01-01 RX ADMIN — LORAZEPAM 1 MG: 2 INJECTION INTRAMUSCULAR; INTRAVENOUS at 12:42

## 2023-01-01 RX ADMIN — IOHEXOL 100 ML: 350 INJECTION, SOLUTION INTRAVENOUS at 10:47

## 2023-01-01 RX ADMIN — STANDARDIZED SENNA CONCENTRATE 8.6 MG: 8.6 TABLET ORAL at 21:59

## 2023-01-01 RX ADMIN — POTASSIUM CHLORIDE 20 MEQ: 1500 TABLET, EXTENDED RELEASE ORAL at 21:25

## 2023-01-01 RX ADMIN — DULOXETINE HYDROCHLORIDE 60 MG: 60 CAPSULE, DELAYED RELEASE ORAL at 17:40

## 2023-01-01 RX ADMIN — ACETYLCYSTEINE 600 MG: 200 INHALANT RESPIRATORY (INHALATION) at 13:31

## 2023-01-01 RX ADMIN — DULOXETINE HYDROCHLORIDE 60 MG: 60 CAPSULE, DELAYED RELEASE ORAL at 08:57

## 2023-01-01 RX ADMIN — DOCUSATE SODIUM 100 MG: 100 CAPSULE, LIQUID FILLED ORAL at 09:18

## 2023-01-01 RX ADMIN — SODIUM CHLORIDE 10 ML: 9 INJECTION, SOLUTION INTRAMUSCULAR; INTRAVENOUS; SUBCUTANEOUS at 13:39

## 2023-01-01 RX ADMIN — STANDARDIZED SENNA CONCENTRATE 8.6 MG: 8.6 TABLET ORAL at 21:05

## 2023-01-01 RX ADMIN — DOCUSATE SODIUM 100 MG: 100 CAPSULE, LIQUID FILLED ORAL at 18:33

## 2023-01-01 RX ADMIN — GLYCOPYRROLATE 0.1 MG: 0.2 INJECTION, SOLUTION INTRAMUSCULAR; INTRAVENOUS at 07:48

## 2023-01-01 RX ADMIN — LEVALBUTEROL HYDROCHLORIDE 1.25 MG: 1.25 SOLUTION, CONCENTRATE RESPIRATORY (INHALATION) at 01:28

## 2023-01-01 RX ADMIN — SODIUM CHLORIDE 10 ML: 9 INJECTION, SOLUTION INTRAMUSCULAR; INTRAVENOUS; SUBCUTANEOUS at 23:58

## 2023-01-01 RX ADMIN — LORAZEPAM 1 MG: 2 INJECTION INTRAMUSCULAR; INTRAVENOUS at 11:38

## 2023-01-01 RX ADMIN — RIVAROXABAN 15 MG: 15 TABLET, FILM COATED ORAL at 09:20

## 2023-01-01 RX ADMIN — CEFAZOLIN SODIUM 2000 MG: 2 SOLUTION INTRAVENOUS at 05:17

## 2023-01-01 RX ADMIN — NAFCILLIN SODIUM 2000 MG: 2 POWDER, FOR SOLUTION INTRAMUSCULAR; INTRAVENOUS at 01:10

## 2023-01-01 RX ADMIN — NAFCILLIN SODIUM 2000 MG: 2 POWDER, FOR SOLUTION INTRAMUSCULAR; INTRAVENOUS at 13:31

## 2023-01-01 RX ADMIN — LEVALBUTEROL HYDROCHLORIDE 1.25 MG: 1.25 SOLUTION, CONCENTRATE RESPIRATORY (INHALATION) at 07:11

## 2023-01-01 RX ADMIN — TORSEMIDE 40 MG: 20 TABLET ORAL at 16:21

## 2023-01-01 RX ADMIN — NAFCILLIN SODIUM 2000 MG: 2 POWDER, FOR SOLUTION INTRAMUSCULAR; INTRAVENOUS at 16:11

## 2023-01-01 RX ADMIN — NAFCILLIN SODIUM 2000 MG: 2 INJECTION, POWDER, LYOPHILIZED, FOR SOLUTION INTRAMUSCULAR; INTRAVENOUS at 23:22

## 2023-01-01 RX ADMIN — NAFCILLIN SODIUM 2000 MG: 2 POWDER, FOR SOLUTION INTRAMUSCULAR; INTRAVENOUS at 08:35

## 2023-01-01 RX ADMIN — LEVALBUTEROL HYDROCHLORIDE 1.25 MG: 1.25 SOLUTION, CONCENTRATE RESPIRATORY (INHALATION) at 13:16

## 2023-01-01 RX ADMIN — ACETYLCYSTEINE 600 MG: 200 SOLUTION ORAL; RESPIRATORY (INHALATION) at 13:20

## 2023-01-01 RX ADMIN — SODIUM CHLORIDE 10 ML: 9 INJECTION, SOLUTION INTRAMUSCULAR; INTRAVENOUS; SUBCUTANEOUS at 14:00

## 2023-01-01 RX ADMIN — ALLOPURINOL 200 MG: 100 TABLET ORAL at 08:18

## 2023-01-01 RX ADMIN — LEVALBUTEROL HYDROCHLORIDE 1.25 MG: 1.25 SOLUTION, CONCENTRATE RESPIRATORY (INHALATION) at 07:55

## 2023-01-01 RX ADMIN — ACETYLCYSTEINE 600 MG: 200 SOLUTION ORAL; RESPIRATORY (INHALATION) at 07:55

## 2023-01-01 RX ADMIN — Medication 250 MG: at 08:18

## 2023-01-01 RX ADMIN — NAFCILLIN SODIUM 2000 MG: 2 POWDER, FOR SOLUTION INTRAMUSCULAR; INTRAVENOUS at 13:56

## 2023-01-01 RX ADMIN — SCOPALAMINE 1 PATCH: 1 PATCH, EXTENDED RELEASE TRANSDERMAL at 04:51

## 2023-01-01 RX ADMIN — LEVALBUTEROL HYDROCHLORIDE 1.25 MG: 1.25 SOLUTION, CONCENTRATE RESPIRATORY (INHALATION) at 08:04

## 2023-01-01 RX ADMIN — ACETYLCYSTEINE 600 MG: 200 SOLUTION ORAL; RESPIRATORY (INHALATION) at 13:17

## 2023-01-01 RX ADMIN — NAFCILLIN SODIUM 2000 MG: 2 INJECTION, POWDER, LYOPHILIZED, FOR SOLUTION INTRAMUSCULAR; INTRAVENOUS at 15:27

## 2023-01-01 RX ADMIN — NAFCILLIN SODIUM 2000 MG: 2 POWDER, FOR SOLUTION INTRAMUSCULAR; INTRAVENOUS at 05:41

## 2023-01-01 RX ADMIN — IOHEXOL 100 ML: 350 INJECTION, SOLUTION INTRAVENOUS at 08:09

## 2023-01-01 RX ADMIN — ACETAMINOPHEN 650 MG: 325 TABLET, FILM COATED ORAL at 09:23

## 2023-01-01 RX ADMIN — ACETYLCYSTEINE 600 MG: 200 SOLUTION ORAL; RESPIRATORY (INHALATION) at 20:24

## 2023-01-01 RX ADMIN — SODIUM CHLORIDE 10 ML: 9 INJECTION, SOLUTION INTRAMUSCULAR; INTRAVENOUS; SUBCUTANEOUS at 21:30

## 2023-01-01 RX ADMIN — LEVALBUTEROL HYDROCHLORIDE 1.25 MG: 1.25 SOLUTION, CONCENTRATE RESPIRATORY (INHALATION) at 07:58

## 2023-01-01 RX ADMIN — FINASTERIDE 5 MG: 5 TABLET, FILM COATED ORAL at 08:58

## 2023-01-01 RX ADMIN — NAFCILLIN SODIUM 2000 MG: 2 INJECTION, POWDER, LYOPHILIZED, FOR SOLUTION INTRAMUSCULAR; INTRAVENOUS at 19:51

## 2023-01-01 RX ADMIN — ACETYLCYSTEINE 600 MG: 200 INHALANT RESPIRATORY (INHALATION) at 21:28

## 2023-01-01 RX ADMIN — ACETYLCYSTEINE 600 MG: 200 SOLUTION ORAL; RESPIRATORY (INHALATION) at 08:05

## 2023-01-01 RX ADMIN — LEVALBUTEROL HYDROCHLORIDE 1.25 MG: 1.25 SOLUTION, CONCENTRATE RESPIRATORY (INHALATION) at 08:46

## 2023-01-01 RX ADMIN — HYDROMORPHONE HYDROCHLORIDE 0.5 MG: 1 INJECTION, SOLUTION INTRAMUSCULAR; INTRAVENOUS; SUBCUTANEOUS at 13:22

## 2023-01-01 RX ADMIN — LEVALBUTEROL HYDROCHLORIDE 1.25 MG: 1.25 SOLUTION, CONCENTRATE RESPIRATORY (INHALATION) at 07:48

## 2023-01-01 RX ADMIN — PANTOPRAZOLE SODIUM 40 MG: 40 TABLET, DELAYED RELEASE ORAL at 05:57

## 2023-01-01 RX ADMIN — POTASSIUM CHLORIDE 40 MEQ: 1500 TABLET, EXTENDED RELEASE ORAL at 13:27

## 2023-01-01 RX ADMIN — CEFAZOLIN SODIUM 2000 MG: 2 SOLUTION INTRAVENOUS at 22:37

## 2023-01-01 RX ADMIN — SODIUM CHLORIDE, SODIUM LACTATE, POTASSIUM CHLORIDE, AND CALCIUM CHLORIDE 125 ML/HR: .6; .31; .03; .02 INJECTION, SOLUTION INTRAVENOUS at 21:57

## 2023-01-01 RX ADMIN — TRAMADOL HYDROCHLORIDE 50 MG: 50 TABLET, COATED ORAL at 13:38

## 2023-01-01 RX ADMIN — ACETYLCYSTEINE 600 MG: 200 SOLUTION ORAL; RESPIRATORY (INHALATION) at 08:48

## 2023-01-01 RX ADMIN — STANDARDIZED SENNA CONCENTRATE 8.6 MG: 8.6 TABLET ORAL at 21:43

## 2023-01-01 RX ADMIN — LEVALBUTEROL HYDROCHLORIDE 1.25 MG: 1.25 SOLUTION, CONCENTRATE RESPIRATORY (INHALATION) at 13:31

## 2023-01-01 RX ADMIN — Medication 250 MG: at 17:41

## 2023-01-01 RX ADMIN — DULOXETINE HYDROCHLORIDE 60 MG: 60 CAPSULE, DELAYED RELEASE ORAL at 17:48

## 2023-01-01 RX ADMIN — LEVALBUTEROL HYDROCHLORIDE 1.25 MG: 1.25 SOLUTION, CONCENTRATE RESPIRATORY (INHALATION) at 19:08

## 2023-01-01 RX ADMIN — LEVALBUTEROL HYDROCHLORIDE 1.25 MG: 1.25 SOLUTION, CONCENTRATE RESPIRATORY (INHALATION) at 15:20

## 2023-01-01 RX ADMIN — NAFCILLIN SODIUM 2000 MG: 2 INJECTION, POWDER, LYOPHILIZED, FOR SOLUTION INTRAMUSCULAR; INTRAVENOUS at 07:34

## 2023-01-01 RX ADMIN — FINASTERIDE 5 MG: 5 TABLET, FILM COATED ORAL at 09:23

## 2023-01-01 RX ADMIN — DULOXETINE HYDROCHLORIDE 60 MG: 60 CAPSULE, DELAYED RELEASE ORAL at 08:07

## 2023-01-01 RX ADMIN — HYDROMORPHONE HYDROCHLORIDE 0.5 MG: 1 INJECTION, SOLUTION INTRAMUSCULAR; INTRAVENOUS; SUBCUTANEOUS at 15:18

## 2023-01-01 RX ADMIN — SODIUM CHLORIDE 10 ML: 9 INJECTION, SOLUTION INTRAMUSCULAR; INTRAVENOUS; SUBCUTANEOUS at 06:00

## 2023-01-01 RX ADMIN — SODIUM CHLORIDE 10 ML: 9 INJECTION, SOLUTION INTRAMUSCULAR; INTRAVENOUS; SUBCUTANEOUS at 13:44

## 2023-01-01 RX ADMIN — PANTOPRAZOLE SODIUM 40 MG: 40 TABLET, DELAYED RELEASE ORAL at 05:08

## 2023-01-01 RX ADMIN — PANTOPRAZOLE SODIUM 40 MG: 40 TABLET, DELAYED RELEASE ORAL at 05:25

## 2023-01-01 RX ADMIN — NAFCILLIN SODIUM 2000 MG: 2 POWDER, FOR SOLUTION INTRAMUSCULAR; INTRAVENOUS at 20:56

## 2023-01-01 RX ADMIN — POTASSIUM CHLORIDE 40 MEQ: 1500 TABLET, EXTENDED RELEASE ORAL at 10:25

## 2023-01-01 RX ADMIN — ACETYLCYSTEINE 600 MG: 200 SOLUTION ORAL; RESPIRATORY (INHALATION) at 19:38

## 2023-01-01 RX ADMIN — NAFCILLIN SODIUM 2000 MG: 2 INJECTION, POWDER, LYOPHILIZED, FOR SOLUTION INTRAMUSCULAR; INTRAVENOUS at 14:53

## 2023-01-01 RX ADMIN — ACETYLCYSTEINE 600 MG: 200 INHALANT RESPIRATORY (INHALATION) at 01:28

## 2023-01-01 RX ADMIN — NAFCILLIN SODIUM 2000 MG: 2 POWDER, FOR SOLUTION INTRAMUSCULAR; INTRAVENOUS at 18:33

## 2023-01-01 RX ADMIN — DOCUSATE SODIUM 100 MG: 100 CAPSULE, LIQUID FILLED ORAL at 08:58

## 2023-01-01 RX ADMIN — DOCUSATE SODIUM 100 MG: 100 CAPSULE, LIQUID FILLED ORAL at 09:11

## 2023-01-01 RX ADMIN — ACETYLCYSTEINE 600 MG: 200 SOLUTION ORAL; RESPIRATORY (INHALATION) at 15:20

## 2023-01-01 RX ADMIN — ACETYLCYSTEINE 600 MG: 200 SOLUTION ORAL; RESPIRATORY (INHALATION) at 07:48

## 2023-01-01 RX ADMIN — LORAZEPAM 0.5 MG: 2 INJECTION INTRAMUSCULAR; INTRAVENOUS at 00:49

## 2023-01-01 RX ADMIN — POTASSIUM CHLORIDE 20 MEQ: 14.9 INJECTION, SOLUTION INTRAVENOUS at 11:51

## 2023-01-01 RX ADMIN — FINASTERIDE 5 MG: 5 TABLET, FILM COATED ORAL at 08:07

## 2023-01-01 RX ADMIN — DULOXETINE HYDROCHLORIDE 60 MG: 60 CAPSULE, DELAYED RELEASE ORAL at 09:23

## 2023-01-01 RX ADMIN — LEVALBUTEROL HYDROCHLORIDE 1.25 MG: 1.25 SOLUTION, CONCENTRATE RESPIRATORY (INHALATION) at 20:15

## 2023-01-01 RX ADMIN — LEVALBUTEROL HYDROCHLORIDE 1.25 MG: 1.25 SOLUTION, CONCENTRATE RESPIRATORY (INHALATION) at 13:20

## 2023-01-01 RX ADMIN — ACETAMINOPHEN 650 MG: 325 TABLET ORAL at 21:59

## 2023-01-01 RX ADMIN — NAFCILLIN SODIUM 2000 MG: 2 POWDER, FOR SOLUTION INTRAMUSCULAR; INTRAVENOUS at 04:09

## 2023-01-01 RX ADMIN — NAFCILLIN SODIUM 2000 MG: 2 INJECTION, POWDER, LYOPHILIZED, FOR SOLUTION INTRAMUSCULAR; INTRAVENOUS at 18:54

## 2023-01-01 RX ADMIN — ALLOPURINOL 200 MG: 100 TABLET ORAL at 09:11

## 2023-01-01 RX ADMIN — NAFCILLIN SODIUM 2000 MG: 2 POWDER, FOR SOLUTION INTRAMUSCULAR; INTRAVENOUS at 09:27

## 2023-01-01 RX ADMIN — DULOXETINE HYDROCHLORIDE 60 MG: 60 CAPSULE, DELAYED RELEASE ORAL at 09:11

## 2023-01-01 RX ADMIN — LORAZEPAM 1 MG: 2 INJECTION INTRAMUSCULAR; INTRAVENOUS at 08:29

## 2023-01-01 RX ADMIN — NAFCILLIN SODIUM 2000 MG: 2 POWDER, FOR SOLUTION INTRAMUSCULAR; INTRAVENOUS at 08:57

## 2023-01-01 RX ADMIN — ALLOPURINOL 200 MG: 100 TABLET ORAL at 09:23

## 2023-01-01 RX ADMIN — Medication 250 MG: at 17:10

## 2023-01-01 RX ADMIN — POTASSIUM CHLORIDE 40 MEQ: 20 SOLUTION ORAL at 17:10

## 2023-01-01 RX ADMIN — GLYCOPYRROLATE 0.1 MG: 0.2 INJECTION INTRAMUSCULAR; INTRAVENOUS at 14:18

## 2023-01-01 RX ADMIN — UMECLIDINIUM 1 PUFF: 62.5 AEROSOL, POWDER ORAL at 09:44

## 2023-01-01 RX ADMIN — LEVALBUTEROL HYDROCHLORIDE 1.25 MG: 1.25 SOLUTION, CONCENTRATE RESPIRATORY (INHALATION) at 21:28

## 2023-01-01 RX ADMIN — UMECLIDINIUM 1 PUFF: 62.5 AEROSOL, POWDER ORAL at 08:18

## 2023-01-01 RX ADMIN — Medication 250 MG: at 08:07

## 2023-01-01 RX ADMIN — TORSEMIDE 40 MG: 20 TABLET ORAL at 09:23

## 2023-01-01 RX ADMIN — SODIUM CHLORIDE, SODIUM LACTATE, POTASSIUM CHLORIDE, AND CALCIUM CHLORIDE 75 ML/HR: .6; .31; .03; .02 INJECTION, SOLUTION INTRAVENOUS at 23:53

## 2023-01-01 RX ADMIN — NAFCILLIN SODIUM 2000 MG: 2 POWDER, FOR SOLUTION INTRAMUSCULAR; INTRAVENOUS at 17:48

## 2023-01-01 RX ADMIN — ACETYLCYSTEINE 600 MG: 200 SOLUTION ORAL; RESPIRATORY (INHALATION) at 14:13

## 2023-01-01 RX ADMIN — NAFCILLIN SODIUM 2000 MG: 2 POWDER, FOR SOLUTION INTRAMUSCULAR; INTRAVENOUS at 13:00

## 2023-01-01 RX ADMIN — GLYCOPYRROLATE 0.1 MG: 0.2 INJECTION INTRAMUSCULAR; INTRAVENOUS at 20:31

## 2023-01-01 RX ADMIN — POTASSIUM CHLORIDE 40 MEQ: 1500 TABLET, EXTENDED RELEASE ORAL at 14:39

## 2023-01-01 RX ADMIN — NAFCILLIN SODIUM 2000 MG: 2 POWDER, FOR SOLUTION INTRAMUSCULAR; INTRAVENOUS at 07:53

## 2023-01-01 RX ADMIN — ACETYLCYSTEINE 600 MG: 200 INHALANT RESPIRATORY (INHALATION) at 07:50

## 2023-01-01 RX ADMIN — NAFCILLIN SODIUM 2000 MG: 2 POWDER, FOR SOLUTION INTRAMUSCULAR; INTRAVENOUS at 00:10

## 2023-01-01 RX ADMIN — ALLOPURINOL 200 MG: 100 TABLET ORAL at 09:18

## 2023-01-01 RX ADMIN — POLYETHYLENE GLYCOL 3350 17 G: 17 POWDER, FOR SOLUTION ORAL at 09:11

## 2023-01-01 RX ADMIN — DOCUSATE SODIUM 100 MG: 100 CAPSULE, LIQUID FILLED ORAL at 17:41

## 2023-01-01 RX ADMIN — FLUTICASONE FUROATE AND VILANTEROL TRIFENATATE 1 PUFF: 200; 25 POWDER RESPIRATORY (INHALATION) at 10:00

## 2023-01-01 RX ADMIN — NAFCILLIN SODIUM 2000 MG: 2 POWDER, FOR SOLUTION INTRAMUSCULAR; INTRAVENOUS at 08:14

## 2023-01-01 RX ADMIN — SODIUM CHLORIDE 10 ML: 9 INJECTION, SOLUTION INTRAMUSCULAR; INTRAVENOUS; SUBCUTANEOUS at 05:29

## 2023-01-01 RX ADMIN — DOCUSATE SODIUM 100 MG: 100 CAPSULE, LIQUID FILLED ORAL at 08:07

## 2023-01-01 RX ADMIN — POTASSIUM CHLORIDE 40 MEQ: 20 SOLUTION ORAL at 09:10

## 2023-01-01 RX ADMIN — LORAZEPAM 1 MG: 2 INJECTION INTRAMUSCULAR; INTRAVENOUS at 06:14

## 2023-01-01 RX ADMIN — NAFCILLIN SODIUM 2000 MG: 2 POWDER, FOR SOLUTION INTRAMUSCULAR; INTRAVENOUS at 05:09

## 2023-01-01 RX ADMIN — FLUTICASONE FUROATE AND VILANTEROL TRIFENATATE 1 PUFF: 200; 25 POWDER RESPIRATORY (INHALATION) at 08:58

## 2023-01-01 RX ADMIN — FINASTERIDE 5 MG: 5 TABLET, FILM COATED ORAL at 08:18

## 2023-01-01 RX ADMIN — ACETYLCYSTEINE 600 MG: 200 SOLUTION ORAL; RESPIRATORY (INHALATION) at 13:19

## 2023-01-01 RX ADMIN — Medication 250 MG: at 17:01

## 2023-01-01 RX ADMIN — NAFCILLIN SODIUM 2000 MG: 2 POWDER, FOR SOLUTION INTRAMUSCULAR; INTRAVENOUS at 16:56

## 2023-01-01 RX ADMIN — NAFCILLIN SODIUM 2000 MG: 2 POWDER, FOR SOLUTION INTRAMUSCULAR; INTRAVENOUS at 21:36

## 2023-01-01 RX ADMIN — Medication 250 MG: at 18:33

## 2023-01-01 RX ADMIN — LEVALBUTEROL HYDROCHLORIDE 1.25 MG: 1.25 SOLUTION, CONCENTRATE RESPIRATORY (INHALATION) at 20:30

## 2023-01-01 RX ADMIN — NAFCILLIN SODIUM 2000 MG: 2 POWDER, FOR SOLUTION INTRAMUSCULAR; INTRAVENOUS at 21:04

## 2023-01-01 RX ADMIN — TORSEMIDE 40 MG: 20 TABLET ORAL at 09:11

## 2023-01-01 RX ADMIN — POLYETHYLENE GLYCOL 3350 17 G: 17 POWDER, FOR SOLUTION ORAL at 08:57

## 2023-01-01 RX ADMIN — LEVALBUTEROL HYDROCHLORIDE 1.25 MG: 1.25 SOLUTION, CONCENTRATE RESPIRATORY (INHALATION) at 07:50

## 2023-01-01 RX ADMIN — SODIUM CHLORIDE 10 ML: 9 INJECTION, SOLUTION INTRAMUSCULAR; INTRAVENOUS; SUBCUTANEOUS at 14:04

## 2023-01-01 RX ADMIN — NAFCILLIN SODIUM 2000 MG: 2 POWDER, FOR SOLUTION INTRAMUSCULAR; INTRAVENOUS at 00:18

## 2023-01-01 RX ADMIN — SODIUM CHLORIDE 20 ML/HR: 0.9 INJECTION, SOLUTION INTRAVENOUS at 17:45

## 2023-01-01 RX ADMIN — NAFCILLIN SODIUM 2000 MG: 2 POWDER, FOR SOLUTION INTRAMUSCULAR; INTRAVENOUS at 04:15

## 2023-01-01 RX ADMIN — DULOXETINE HYDROCHLORIDE 60 MG: 60 CAPSULE, DELAYED RELEASE ORAL at 17:01

## 2023-01-01 RX ADMIN — SODIUM CHLORIDE 10 ML: 9 INJECTION, SOLUTION INTRAMUSCULAR; INTRAVENOUS; SUBCUTANEOUS at 16:51

## 2023-01-01 RX ADMIN — POLYETHYLENE GLYCOL 3350 17 G: 17 POWDER, FOR SOLUTION ORAL at 09:24

## 2023-01-01 RX ADMIN — UMECLIDINIUM 1 PUFF: 62.5 AEROSOL, POWDER ORAL at 09:29

## 2023-01-01 RX ADMIN — NAFCILLIN SODIUM 2000 MG: 2 POWDER, FOR SOLUTION INTRAMUSCULAR; INTRAVENOUS at 14:00

## 2023-01-01 RX ADMIN — DULOXETINE HYDROCHLORIDE 60 MG: 60 CAPSULE, DELAYED RELEASE ORAL at 17:10

## 2023-01-01 RX ADMIN — SODIUM CHLORIDE 10 ML: 9 INJECTION, SOLUTION INTRAMUSCULAR; INTRAVENOUS; SUBCUTANEOUS at 21:04

## 2023-01-01 RX ADMIN — DOCUSATE SODIUM 100 MG: 100 CAPSULE, LIQUID FILLED ORAL at 17:43

## 2023-01-01 RX ADMIN — PANTOPRAZOLE SODIUM 40 MG: 40 TABLET, DELAYED RELEASE ORAL at 05:56

## 2023-01-01 RX ADMIN — DULOXETINE HYDROCHLORIDE 60 MG: 60 CAPSULE, DELAYED RELEASE ORAL at 17:43

## 2023-01-01 RX ADMIN — POLYETHYLENE GLYCOL 3350 17 G: 17 POWDER, FOR SOLUTION ORAL at 08:07

## 2023-01-01 RX ADMIN — FINASTERIDE 5 MG: 5 TABLET, FILM COATED ORAL at 09:18

## 2023-01-01 RX ADMIN — LEVALBUTEROL HYDROCHLORIDE 1.25 MG: 1.25 SOLUTION, CONCENTRATE RESPIRATORY (INHALATION) at 21:25

## 2023-01-01 RX ADMIN — FLUTICASONE FUROATE AND VILANTEROL TRIFENATATE 1 PUFF: 200; 25 POWDER RESPIRATORY (INHALATION) at 09:26

## 2023-01-01 RX ADMIN — POTASSIUM CHLORIDE 20 MEQ: 14.9 INJECTION, SOLUTION INTRAVENOUS at 16:49

## 2023-01-01 RX ADMIN — DOCUSATE SODIUM 100 MG: 100 CAPSULE, LIQUID FILLED ORAL at 17:59

## 2023-01-01 RX ADMIN — LEVALBUTEROL HYDROCHLORIDE 1.25 MG: 1.25 SOLUTION, CONCENTRATE RESPIRATORY (INHALATION) at 19:38

## 2023-01-01 RX ADMIN — DULOXETINE HYDROCHLORIDE 60 MG: 60 CAPSULE, DELAYED RELEASE ORAL at 08:18

## 2023-01-01 RX ADMIN — NAFCILLIN SODIUM 2000 MG: 2 INJECTION, POWDER, LYOPHILIZED, FOR SOLUTION INTRAMUSCULAR; INTRAVENOUS at 03:15

## 2023-01-01 RX ADMIN — Medication 250 MG: at 09:23

## 2023-01-01 RX ADMIN — HYDROMORPHONE HYDROCHLORIDE 0.5 MG: 1 INJECTION, SOLUTION INTRAMUSCULAR; INTRAVENOUS; SUBCUTANEOUS at 09:00

## 2023-01-01 RX ADMIN — ALLOPURINOL 200 MG: 100 TABLET ORAL at 08:19

## 2023-01-01 RX ADMIN — LEVALBUTEROL HYDROCHLORIDE 1.25 MG: 1.25 SOLUTION, CONCENTRATE RESPIRATORY (INHALATION) at 14:13

## 2023-01-01 RX ADMIN — FINASTERIDE 5 MG: 5 TABLET, FILM COATED ORAL at 08:19

## 2023-01-01 RX ADMIN — DULOXETINE HYDROCHLORIDE 60 MG: 60 CAPSULE, DELAYED RELEASE ORAL at 09:18

## 2023-01-01 RX ADMIN — FUROSEMIDE 40 MG: 10 INJECTION, SOLUTION INTRAMUSCULAR; INTRAVENOUS at 23:12

## 2023-01-01 RX ADMIN — PANTOPRAZOLE SODIUM 40 MG: 40 TABLET, DELAYED RELEASE ORAL at 05:40

## 2023-01-01 RX ADMIN — ACETAMINOPHEN 650 MG: 325 TABLET ORAL at 02:21

## 2023-01-01 RX ADMIN — FINASTERIDE 5 MG: 5 TABLET, FILM COATED ORAL at 09:11

## 2023-01-01 RX ADMIN — GLYCOPYRROLATE 0.1 MG: 0.2 INJECTION INTRAMUSCULAR; INTRAVENOUS at 16:06

## 2023-01-01 RX ADMIN — ACETYLCYSTEINE 600 MG: 200 SOLUTION ORAL; RESPIRATORY (INHALATION) at 20:33

## 2023-01-01 RX ADMIN — POLYETHYLENE GLYCOL 3350 17 G: 17 POWDER, FOR SOLUTION ORAL at 08:19

## 2023-01-01 RX ADMIN — HYDROMORPHONE HYDROCHLORIDE 0.5 MG: 1 INJECTION, SOLUTION INTRAMUSCULAR; INTRAVENOUS; SUBCUTANEOUS at 11:06

## 2023-01-01 RX ADMIN — TORSEMIDE 40 MG: 20 TABLET ORAL at 08:58

## 2023-01-01 RX ADMIN — NAFCILLIN SODIUM 2000 MG: 2 POWDER, FOR SOLUTION INTRAMUSCULAR; INTRAVENOUS at 17:40

## 2023-01-01 RX ADMIN — Medication 250 MG: at 17:59

## 2023-01-01 RX ADMIN — NAFCILLIN SODIUM 2000 MG: 2 POWDER, FOR SOLUTION INTRAMUSCULAR; INTRAVENOUS at 04:16

## 2023-01-01 RX ADMIN — GLYCOPYRROLATE 0.1 MG: 0.2 INJECTION INTRAMUSCULAR; INTRAVENOUS at 17:37

## 2023-01-01 RX ADMIN — NAFCILLIN SODIUM 2000 MG: 2 POWDER, FOR SOLUTION INTRAMUSCULAR; INTRAVENOUS at 17:04

## 2023-01-01 RX ADMIN — ACETYLCYSTEINE 600 MG: 200 INHALANT RESPIRATORY (INHALATION) at 07:11

## 2023-01-01 RX ADMIN — FLUTICASONE FUROATE AND VILANTEROL TRIFENATATE 1 PUFF: 200; 25 POWDER RESPIRATORY (INHALATION) at 08:17

## 2023-01-01 RX ADMIN — LEVALBUTEROL HYDROCHLORIDE 1.25 MG: 1.25 SOLUTION, CONCENTRATE RESPIRATORY (INHALATION) at 07:01

## 2023-01-01 RX ADMIN — ALLOPURINOL 200 MG: 100 TABLET ORAL at 08:57

## 2023-01-01 RX ADMIN — ACETYLCYSTEINE 600 MG: 200 SOLUTION ORAL; RESPIRATORY (INHALATION) at 08:46

## 2023-01-01 RX ADMIN — LEVALBUTEROL HYDROCHLORIDE 1.25 MG: 1.25 SOLUTION, CONCENTRATE RESPIRATORY (INHALATION) at 20:24

## 2023-01-01 RX ADMIN — SODIUM CHLORIDE 10 ML: 9 INJECTION, SOLUTION INTRAMUSCULAR; INTRAVENOUS; SUBCUTANEOUS at 05:09

## 2023-01-01 RX ADMIN — NAFCILLIN SODIUM 2000 MG: 2 POWDER, FOR SOLUTION INTRAMUSCULAR; INTRAVENOUS at 21:03

## 2023-01-01 RX ADMIN — LEVALBUTEROL HYDROCHLORIDE 1.25 MG: 1.25 SOLUTION, CONCENTRATE RESPIRATORY (INHALATION) at 20:33

## 2023-01-01 RX ADMIN — GLYCOPYRROLATE 0.1 MG: 0.2 INJECTION, SOLUTION INTRAMUSCULAR; INTRAVENOUS at 11:42

## 2023-01-01 RX ADMIN — LEVALBUTEROL HYDROCHLORIDE 1.25 MG: 1.25 SOLUTION, CONCENTRATE RESPIRATORY (INHALATION) at 08:44

## 2023-01-01 RX ADMIN — NAFCILLIN SODIUM 2000 MG: 2 POWDER, FOR SOLUTION INTRAMUSCULAR; INTRAVENOUS at 04:18

## 2023-01-01 RX ADMIN — NAFCILLIN SODIUM 2000 MG: 2 POWDER, FOR SOLUTION INTRAMUSCULAR; INTRAVENOUS at 00:25

## 2023-01-01 RX ADMIN — Medication 250 MG: at 17:28

## 2023-01-01 RX ADMIN — FLUTICASONE FUROATE AND VILANTEROL TRIFENATATE 1 PUFF: 200; 25 POWDER RESPIRATORY (INHALATION) at 09:44

## 2023-01-01 RX ADMIN — SENNOSIDES 8.6 MG: 8.6 TABLET, FILM COATED ORAL at 02:43

## 2023-01-01 RX ADMIN — NAFCILLIN SODIUM 2000 MG: 2 POWDER, FOR SOLUTION INTRAMUSCULAR; INTRAVENOUS at 21:05

## 2023-01-01 RX ADMIN — DOCUSATE SODIUM 100 MG: 100 CAPSULE, LIQUID FILLED ORAL at 17:48

## 2023-01-01 RX ADMIN — LORAZEPAM 0.5 MG: 2 INJECTION INTRAMUSCULAR; INTRAVENOUS at 04:37

## 2023-01-01 RX ADMIN — POTASSIUM CHLORIDE 20 MEQ: 14.9 INJECTION, SOLUTION INTRAVENOUS at 22:31

## 2023-01-01 RX ADMIN — UMECLIDINIUM 1 PUFF: 62.5 AEROSOL, POWDER ORAL at 09:58

## 2023-01-01 RX ADMIN — ACETYLCYSTEINE 600 MG: 200 SOLUTION ORAL; RESPIRATORY (INHALATION) at 13:16

## 2023-01-01 RX ADMIN — HYDROMORPHONE HYDROCHLORIDE 0.5 MG/HR: 10 INJECTION, SOLUTION INTRAMUSCULAR; INTRAVENOUS; SUBCUTANEOUS at 16:53

## 2023-01-01 RX ADMIN — LEVALBUTEROL HYDROCHLORIDE 1.25 MG: 1.25 SOLUTION, CONCENTRATE RESPIRATORY (INHALATION) at 13:19

## 2023-01-01 RX ADMIN — ACETYLCYSTEINE 600 MG: 200 SOLUTION ORAL; RESPIRATORY (INHALATION) at 07:01

## 2023-01-01 RX ADMIN — ACETYLCYSTEINE 600 MG: 200 INHALANT RESPIRATORY (INHALATION) at 20:31

## 2023-01-01 RX ADMIN — SENNOSIDES 8.6 MG: 8.6 TABLET, FILM COATED ORAL at 21:42

## 2023-01-01 RX ADMIN — POTASSIUM CHLORIDE 40 MEQ: 1500 TABLET, EXTENDED RELEASE ORAL at 22:28

## 2023-01-01 RX ADMIN — ACETYLCYSTEINE 600 MG: 200 INHALANT RESPIRATORY (INHALATION) at 12:43

## 2023-01-01 RX ADMIN — POLYETHYLENE GLYCOL 3350 17 G: 17 POWDER, FOR SOLUTION ORAL at 14:53

## 2023-01-01 RX ADMIN — NAFCILLIN SODIUM 2000 MG: 2 INJECTION, POWDER, LYOPHILIZED, FOR SOLUTION INTRAMUSCULAR; INTRAVENOUS at 10:26

## 2023-01-01 RX ADMIN — DULOXETINE HYDROCHLORIDE 60 MG: 60 CAPSULE, DELAYED RELEASE ORAL at 17:59

## 2023-01-01 RX ADMIN — POTASSIUM CHLORIDE 20 MEQ: 1500 TABLET, EXTENDED RELEASE ORAL at 17:41

## 2023-01-01 RX ADMIN — Medication 250 MG: at 08:57

## 2023-01-01 RX ADMIN — DULOXETINE HYDROCHLORIDE 60 MG: 60 CAPSULE, DELAYED RELEASE ORAL at 17:27

## 2023-01-01 RX ADMIN — ACETYLCYSTEINE 3 ML: 200 SOLUTION ORAL; RESPIRATORY (INHALATION) at 19:08

## 2023-01-01 RX ADMIN — DOCUSATE SODIUM 100 MG: 100 CAPSULE, LIQUID FILLED ORAL at 17:28

## 2023-01-01 RX ADMIN — DOCUSATE SODIUM 100 MG: 100 CAPSULE, LIQUID FILLED ORAL at 08:18

## 2023-01-01 RX ADMIN — LEVALBUTEROL HYDROCHLORIDE 1.25 MG: 1.25 SOLUTION, CONCENTRATE RESPIRATORY (INHALATION) at 12:44

## 2023-01-01 RX ADMIN — TORSEMIDE 40 MG: 20 TABLET ORAL at 08:07

## 2023-01-01 RX ADMIN — SODIUM CHLORIDE 10 ML: 9 INJECTION, SOLUTION INTRAMUSCULAR; INTRAVENOUS; SUBCUTANEOUS at 05:56

## 2023-01-01 RX ADMIN — NAFCILLIN SODIUM 2000 MG: 2 POWDER, FOR SOLUTION INTRAMUSCULAR; INTRAVENOUS at 20:39

## 2023-01-01 RX ADMIN — ALLOPURINOL 200 MG: 100 TABLET ORAL at 08:07

## 2023-01-01 RX ADMIN — DOCUSATE SODIUM 100 MG: 100 CAPSULE, LIQUID FILLED ORAL at 08:17

## 2023-01-01 RX ADMIN — ACETYLCYSTEINE 600 MG: 200 SOLUTION ORAL; RESPIRATORY (INHALATION) at 20:15

## 2023-01-01 RX ADMIN — SODIUM CHLORIDE 10 ML: 9 INJECTION, SOLUTION INTRAMUSCULAR; INTRAVENOUS; SUBCUTANEOUS at 13:00

## 2023-01-01 RX ADMIN — NAFCILLIN SODIUM 2000 MG: 2 POWDER, FOR SOLUTION INTRAMUSCULAR; INTRAVENOUS at 23:13

## 2023-01-01 RX ADMIN — ACETYLCYSTEINE 600 MG: 200 SOLUTION ORAL; RESPIRATORY (INHALATION) at 21:25

## 2023-01-01 RX ADMIN — ACETYLCYSTEINE 600 MG: 200 SOLUTION ORAL; RESPIRATORY (INHALATION) at 07:58

## 2023-01-01 RX ADMIN — DOCUSATE SODIUM 100 MG: 100 CAPSULE, LIQUID FILLED ORAL at 09:23

## 2023-01-01 RX ADMIN — NAFCILLIN SODIUM 2000 MG: 2 POWDER, FOR SOLUTION INTRAMUSCULAR; INTRAVENOUS at 12:51

## 2023-01-01 RX ADMIN — PANTOPRAZOLE SODIUM 40 MG: 40 TABLET, DELAYED RELEASE ORAL at 05:17

## 2023-01-01 RX ADMIN — POTASSIUM CHLORIDE 20 MEQ: 1500 TABLET, EXTENDED RELEASE ORAL at 16:56

## 2023-01-01 RX ADMIN — Medication 250 MG: at 09:18

## 2023-01-01 RX ADMIN — Medication 250 MG: at 09:11

## 2023-01-01 RX ADMIN — MAGNESIUM SULFATE HEPTAHYDRATE 2 G: 40 INJECTION, SOLUTION INTRAVENOUS at 13:08

## 2023-01-03 ENCOUNTER — TELEPHONE (OUTPATIENT)
Dept: PULMONOLOGY | Facility: CLINIC | Age: 83
End: 2023-01-03

## 2023-01-03 ENCOUNTER — OFFICE VISIT (OUTPATIENT)
Dept: FAMILY MEDICINE CLINIC | Facility: CLINIC | Age: 83
End: 2023-01-03

## 2023-01-03 ENCOUNTER — TELEPHONE (OUTPATIENT)
Dept: FAMILY MEDICINE CLINIC | Facility: CLINIC | Age: 83
End: 2023-01-03

## 2023-01-03 ENCOUNTER — TELEPHONE (OUTPATIENT)
Dept: INFECTIOUS DISEASES | Facility: CLINIC | Age: 83
End: 2023-01-03

## 2023-01-03 VITALS
WEIGHT: 249 LBS | SYSTOLIC BLOOD PRESSURE: 106 MMHG | OXYGEN SATURATION: 95 % | RESPIRATION RATE: 16 BRPM | HEIGHT: 74 IN | DIASTOLIC BLOOD PRESSURE: 56 MMHG | BODY MASS INDEX: 31.95 KG/M2 | HEART RATE: 81 BPM | TEMPERATURE: 97.8 F

## 2023-01-03 DIAGNOSIS — J43.2 CENTRILOBULAR EMPHYSEMA (HCC): Chronic | ICD-10-CM

## 2023-01-03 DIAGNOSIS — R78.81 MSSA BACTEREMIA: Primary | ICD-10-CM

## 2023-01-03 DIAGNOSIS — I50.32 CHRONIC DIASTOLIC HEART FAILURE (HCC): ICD-10-CM

## 2023-01-03 DIAGNOSIS — J41.8 MIXED SIMPLE AND MUCOPURULENT CHRONIC BRONCHITIS (HCC): ICD-10-CM

## 2023-01-03 DIAGNOSIS — I48.20 CHRONIC A-FIB (HCC): ICD-10-CM

## 2023-01-03 DIAGNOSIS — S16.1XXA STRAIN OF NECK MUSCLE, INITIAL ENCOUNTER: ICD-10-CM

## 2023-01-03 DIAGNOSIS — B95.61 MSSA BACTEREMIA: Primary | ICD-10-CM

## 2023-01-03 PROBLEM — G93.40 ACUTE ENCEPHALOPATHY: Status: RESOLVED | Noted: 2022-01-01 | Resolved: 2023-01-01

## 2023-01-03 RX ORDER — TORSEMIDE 20 MG/1
TABLET ORAL
COMMUNITY
Start: 2022-12-30 | End: 2023-01-03

## 2023-01-03 RX ORDER — CEFAZOLIN SODIUM 10 G/1
INJECTION, POWDER, FOR SOLUTION INTRAVENOUS
COMMUNITY
Start: 2023-01-03 | End: 2023-01-03

## 2023-01-03 RX ORDER — LIDOCAINE 50 MG/G
1 PATCH TOPICAL DAILY
Qty: 30 PATCH | Refills: 5 | Status: ON HOLD | OUTPATIENT
Start: 2023-01-03

## 2023-01-03 RX ORDER — 0.9 % SODIUM CHLORIDE 0.9 %
10 VIAL (ML) INJECTION 3 TIMES DAILY
Status: ON HOLD | COMMUNITY
Start: 2022-12-29

## 2023-01-03 NOTE — ASSESSMENT & PLAN NOTE
On recent admission noted to have MSSA bacteremia  · Cont IV Ancef 2 g every 8 hours for total of 6 weeks through 1/17/23  · Patient underwent TTE and MARY on prior admission  · Pt going home with home infusions  · Outpatient labs per ID

## 2023-01-03 NOTE — TELEPHONE ENCOUNTER
Called pt to confirm appt on 1/16 and spoke to wife  She asked if there was an available appt closer to her home  We offered virtual visit and she accepted    Link to be sent to [phone]    Pt's wife verbalizes understanding and confirms appt date and time

## 2023-01-03 NOTE — ASSESSMENT & PLAN NOTE
Presented to the ER due to shortness of breath from [facility]  · Influenza A positive  · Completed 5 day course of Tamiflu  · Stable on RA

## 2023-01-03 NOTE — ASSESSMENT & PLAN NOTE
Wt Readings from Last 3 Encounters:   12/28/22 118 kg (260 lb 2 3 oz)   12/07/22 120 kg (265 lb)   11/22/22 121 kg (265 lb 10 5 oz)     Chest x-ray with pulmonary vascular congestion/pulmonary edema with bibasilar atelectasis  · Likely due to severe aortic stenosis  Patient received a dose of IV Lasix in the ER  · IV lasix + albumin given 12/25    Was on 40 mg of IV Lasix twice daily, transitioned to Demadex 40 mg twice daily on d/c  · Discussed with patient and family the importance of follow-up after discharge for possible aortic valve replacement

## 2023-01-03 NOTE — PROGRESS NOTES
Assessment/Plan:    No problem-specific Assessment & Plan notes found for this encounter  CHF stable, f/u with cardiology for diuretic mgmt and surgical timing of aortic valve surgery, has achieved wt goal <250 lbs    CAF stable    Copd, awaiting mucomyst, f/u with pulmonary    Cont iv ancef for MSSA bacteremia, regular labs, ID f/u     Diagnoses and all orders for this visit:    MSSA bacteremia    Chronic diastolic heart failure (HCC)    Chronic a-fib (HCC)    Mixed simple and mucopurulent chronic bronchitis (HCC)    Centrilobular emphysema ([address] )    Strain of neck muscle, initial encounter  -     lidocaine (LIDODERM) 5 %; Apply 1 patch topically daily Remove & Discard patch within 12 hours    Other orders  -     Discontinue: ceFAZolin (ANCEF) 10 g injection  -     sodium chloride, PF, 0 9 %; Inject 10 mL into a catheter in a vein Three times a day  -     Discontinue: ceFAZolin (ANCEF) 10 g injection  -     Heparin Sod, Pork, Lock Flush (heparin flush in sodium acetate 0 225%) 0 5 units/mL; Inject into a catheter in a vein  -     Discontinue: torsemide (DEMADEX) 20 mg tablet        Return if symptoms worsen or fail to improve  Subjective:      Patient ID: Amy Hernandez is a 80 y o  male      Chief Complaint   Patient presents with   • Tranfer of care     Julian Zapata         HPI  No fevers  More sob lately  Been losing weight since dc, down to 249  Getting home PT  3x/w  Did not like [facility]  Doing home iv  Phlegm is brown/gray, not yellow/green/bloody    Gets dizzy/lightheaded sometimes with exertion  Vision may seem blurry    TCM Call     Date and time call was made  12/29/2022  3:34 PM    Hospital care reviewed  Records reviewed    Patient was hospitialized at  [address] [place] [address]    Date of Admission  12/24/22    Date of discharge  12/29/22    Diagnosis  Influenza    Disposition  Home    Were the patients medications reviewed and updated  No    Current Symptoms  Shortness of breath  Patient is still haing some SOB    Cough Severity  Mild    Shortness of breath severity  Moderate    Fatigue severity  Mild  Pt is still fatigued and recovering, states he did not get much rest in the hospital  Tamela Wooten      TCM Call     Post hospital issues  None    Should patient be enrolled in anticoag monitoring? No    Scheduled for follow up? Yes    Patients specialists  Pulmonlolgist    Cardiologist name  Dr Jackelin Casillas name  Dr Rose Obrien    Other specialists names  Dr Macho Lux    Did you obtain your prescribed medications  No    Why were you unable to obtain your medications  They did not order any new medication for the patient to     Do you need help managing your prescriptions or medications  Yes    Why type of assitance do you need  Daughter mangages his medications    Is transportation to your appointment needed  Yes    Specify why  He cannot drive, his daughters or wife drives him    I have advised the patient to call PCP with any new or worsening symptoms  Sai Fong LPN    Living Arrangements  Family members; Children; Spouse or Significiant other    Support System  Family; Children; Spouse    The type of support provided  Emotional; Physical    Do you have social support  Yes, as much as I need    Are you recieving any outpatient services  No    What type of services  PT and Cardiac Rehab, but needs a clearance letter before can attend  [facility]    Are you recieving home care services  Yes    Types of home care services  Nurse visit  Has [address] [place] [address] visiting nurse for sepsis    Are you using any community resources  No    Current waiver services  No    Have you fallen in the last 12 months  Yes    How many times  2 falls in the past 12 months    Interperter language line needed  No    Counseling  Family  Daughter    Counseling topics  Activities of daily living; Importance of RX compliance    Comments  Spoke to the patient and his daughter, they had me on speaker   They went through the questions with me and scheduled a follow up  They are aware if his symptoms worsen or return, to go back to the ER  nm lpn        The following portions of the patient's history were reviewed and updated as appropriate: allergies, current medications, past family history, past medical history, past social history, past surgical history and problem list     Review of Systems   Constitutional: Negative for chills and fever  Current Outpatient Medications   Medication Sig Dispense Refill   • acetylcysteine (MUCOMYST) 200 mg/mL nebulizer solution Take 3 mL (600 mg total) by nebulization 3 (three) times a day 270 mL 0   • Albuterol Sulfate (albuterol, FOR EMS ONLY,) (2 5 mg/3 mL) 0 083 % nebulizer solution Take 2 5 mg by nebulization every 6 (six) hours as needed for wheezing     • allopurinol (ZYLOPRIM) 100 mg tablet Take 2 tablets (200 mg total) by mouth daily 180 tablet 3   • ceFAZolin (ANCEF) 2000 mg IVPB Inject 2,000 mg into a catheter in a vein every 8 (eight) hours for 28 days  0   • docusate sodium (COLACE) 100 mg capsule Take 1 capsule (100 mg total) by mouth 2 (two) times a day  0   • DULoxetine (CYMBALTA) 60 mg delayed release capsule TAKE 1 CAPSULE TWICE DAILY 60 capsule 1   • finasteride (PROSCAR) 5 mg tablet TAKE 1 TABLET DAILY 90 tablet 1   • fluticasone-umeclidinium-vilanterol (Trelegy Ellipta) 200-62 5-25 mcg/actuation AEPB inhaler Inhale 1 puff daily Rinse mouth after use  60 each 5   • Heparin Sod, Pork, Lock Flush (heparin flush in sodium acetate 0 225%) 0 5 units/mL Inject into a catheter in a vein     • levalbuterol (XOPENEX) 1 25 mg/0 5 mL nebulizer solution Take 0 5 mL (1 25 mg total) by nebulization 3 (three) times a day 45 mL 0   • lidocaine (LIDODERM) 5 % Apply 1 patch topically daily Remove & Discard patch within 12 hours 30 patch 5   • pantoprazole (PROTONIX) 40 mg tablet Take 1 tablet (40 mg total) by mouth daily in the early morning Do not start before November 23, 2022  30 tablet 0   • polyethylene glycol (MIRALAX) 17 g packet Take 17 g by mouth daily as needed     • rivaroxaban (XARELTO) 15 mg tablet Take 1 tablet (15 mg total) by mouth daily with breakfast     • saccharomyces boulardii (FLORASTOR) 250 mg capsule Take 1 capsule (250 mg total) by mouth 2 (two) times a day for 28 days  0   • senna (SENOKOT) 8 6 mg Take 1 tablet (8 6 mg total) by mouth daily at bedtime  0   • sodium chloride, PF, 0 9 % Inject 10 mL into a catheter in a vein Three times a day     • Torsemide 40 MG TABS Take 40 mg by mouth 2 (two) times a day 60 tablet 0   • atorvastatin (LIPITOR) 40 mg tablet Take 1 tablet (40 mg total) by mouth daily with dinner (Patient not taking: Reported on 1/3/2023)  0   • gabapentin (NEURONTIN) 100 mg capsule TAKE ONE CAPSULE BY MOUTH AT BEDTIME (Patient not taking: Reported on 1/3/2023) 30 capsule 1   • methocarbamol (ROBAXIN) 500 mg tablet Take 1 tablet (500 mg total) by mouth every 6 (six) hours as needed for muscle spasms (neck pain) (Patient not taking: Reported on 1/3/2023)  0   • sodium chloride 0 9 % nebulizer solution Take 3 mL by nebulization 3 (three) times a day (Patient not taking: Reported on 1/3/2023)  0     No current facility-administered medications for this visit  Objective:    /56   Pulse 81   Temp 97 8 °F (36 6 °C)   Resp 16   Ht 6' 2" (1 88 m)   Wt 113 kg (249 lb)   SpO2 95%   BMI 31 97 kg/m²        Physical Exam  Vitals and nursing note reviewed  Constitutional:       Appearance: He is well-developed  He is obese  HENT:      Head: Normocephalic  Right Ear: Tympanic membrane normal       Left Ear: Tympanic membrane normal    Eyes:      General: No scleral icterus  Conjunctiva/sclera: Conjunctivae normal    Cardiovascular:      Rate and Rhythm: Rhythm irregularly irregular  Heart sounds: Murmur heard  Pulmonary:      Effort: Pulmonary effort is normal  No respiratory distress     Abdominal:      Palpations: Abdomen is soft    Musculoskeletal:         General: No deformity  Cervical back: Neck supple  Right lower leg: No edema  Left lower leg: No edema  Skin:     General: Skin is warm and dry  Coloration: Skin is not pale  Neurological:      Mental Status: He is alert  Gait: Gait abnormal    Psychiatric:         Behavior: Behavior normal          Thought Content:  Thought content normal        PICC right ACW w/o tenderness         Annmarie Canchola DO

## 2023-01-03 NOTE — ASSESSMENT & PLAN NOTE
Hemoglobin stable      Results from last 7 days   Lab Units 12/28/22  0538   HEMOGLOBIN g/dL 8 2*   HEMATOCRIT % 27 9*   MCV fL 90

## 2023-01-03 NOTE — ASSESSMENT & PLAN NOTE
Lab Results   Component Value Date    EGFR 50 12/29/2022    EGFR 46 12/28/2022    EGFR 44 12/27/2022    CREATININE 1 30 12/29/2022    CREATININE 1 39 (H) 12/28/2022    CREATININE 1 45 (H) 12/27/2022     Baseline creatinine 1 3-1 6  Creatinine continues to remain at baseline with diureses  Repeat labs after d/c

## 2023-01-04 ENCOUNTER — TELEPHONE (OUTPATIENT)
Dept: FAMILY MEDICINE CLINIC | Facility: CLINIC | Age: 83
End: 2023-01-04

## 2023-01-04 NOTE — PROGRESS NOTES
I spoke to Marilyn Becerra about her father's medication for nebulizer    He is currently using duonebs and awaiting mucomyst

## 2023-01-04 NOTE — TELEPHONE ENCOUNTER
----- Message from [facility], DO sent at 1/3/2023 12:34 PM EST -----  Regarding: RE: Discharge  Thank you for allowing us to participate in the care of your patient, Maral Witt, who was hospitalized from 12/24/2022 through 1/3/2023 with the admitting diagnosis of influenza A and chf exacerbation  Completed course of tamiflu and diuresed with IV lasix and transitioned to demadex BID  Will need rpt ct for follwup of nodules  If you have any additional questions or would like to discuss further, please feel free to contact me      [facility], [address] Internal Medicine, Hospitalist  [phone]

## 2023-01-04 NOTE — TELEPHONE ENCOUNTER
Prior Auth on Lidocaine Patch 5% Cover My Meds Key [id number]  ID [id number] Medicare Part D   Forms completed and approved     ARIES rowe

## 2023-01-06 ENCOUNTER — PATIENT OUTREACH (OUTPATIENT)
Dept: FAMILY MEDICINE CLINIC | Facility: CLINIC | Age: 83
End: 2023-01-06

## 2023-01-06 NOTE — PROGRESS NOTES
Chart was reviewed and this OPCM RN called patients wife Lui Guardado for follow up  She states patient is doing well  She states he is still getting IV Ancef Infusions which she and her daughters are doing and have no issues with  Patient still has services with [facility]  A nurse comes out weekly for PICC care and draws blood work  He has PT coming out 3 x a week and a HHA once a week  She states he just started with OT as well-they just started yesterday  She states they have all of patients medications and just received the mucomyst that was ordered which they started (nebs)  She states patient is taking all of his medications as ordered and she manages them  Patient has transportation to all appointments with family  She declines any needs at this time and is agreeable for a follow up call in 3 weeks  She was provided with my contact information and encouraged to call with any questions or concerns

## 2023-01-10 PROBLEM — G93.41 METABOLIC ENCEPHALOPATHY: Status: ACTIVE | Noted: 2023-01-01

## 2023-01-11 PROBLEM — G93.9 BRAIN LESION: Status: ACTIVE | Noted: 2023-01-01

## 2023-01-11 NOTE — ASSESSMENT & PLAN NOTE
Sodium 130  · Secondary to decreased oral intake, dehydration?   · Continue gentle IVFs overnight  · Recheck in Am

## 2023-01-11 NOTE — ED PROVIDER NOTES
History  Chief Complaint   Patient presents with   • Dizziness     Recently d c from hospital for sepsis, flu A + and MSSA bacteriemia (on IV antibiotic until 1/17)  Has been having increase SOB and dizziness  On Home rehab  This is an 80years old came back from home for having multiple symptoms  According to the wife she stated that he has dizziness is more confused he has lightheadedness, loss of appetite, urinary and fecal incontinence, low pulse ox 86% on room air low-grade temp 99 1F  Patient wife stated that he has multiple falls and he fell 2 times yesterday  Wife stated also that he has periods of confusion and lethargy  At the ER patient is lethargic but he can be wake up and respond to questions  Patient has dyspnea on exertion also  Patient was admitted to the hospital before and he found that he has sepsis due to MSSA and patient started on cefazolin 1 g every 8 hours  This supposed to be finished by 1/17/2023  Patient has proved for it  At the ER patient has no fever and he has pulse ox 98% on room air  Patient has dizziness fatigue weakness loss of weight  Patient has history of A  fib and he is on Xarelto  On the last admission patient also having tested positive for flu A  Patient denies abdominal pain vomiting diarrhea  Most of the history we got it from his wife as patient is too weak to talk  Patient denies chest pain  Prior to Admission Medications   Prescriptions Last Dose Informant Patient Reported? Taking?    Albuterol Sulfate (albuterol, FOR EMS ONLY,) (2 5 mg/3 mL) 0 083 % nebulizer solution   Yes No   Sig: Take 2 5 mg by nebulization every 6 (six) hours as needed for wheezing   DULoxetine (CYMBALTA) 60 mg delayed release capsule   No No   Sig: TAKE 1 CAPSULE TWICE DAILY   Heparin Sod, Pork, Lock Flush (heparin flush in sodium acetate 0 225%) 0 5 units/mL   Yes No   Sig: Inject into a catheter in a vein   Torsemide 40 MG TABS   No No   Sig: Take 40 mg by mouth 2 (two) times a day   acetylcysteine (MUCOMYST) 200 mg/mL nebulizer solution   No No   Sig: Take 3 mL (600 mg total) by nebulization 3 (three) times a day   allopurinol (ZYLOPRIM) 100 mg tablet   No No   Sig: Take 2 tablets (200 mg total) by mouth daily   ceFAZolin (ANCEF) 2000 mg IVPB 1/10/2023  No Yes   Sig: Inject 2,000 mg into a catheter in a vein every 8 (eight) hours for 28 days   docusate sodium (COLACE) 100 mg capsule   No No   Sig: Take 1 capsule (100 mg total) by mouth 2 (two) times a day   finasteride (PROSCAR) 5 mg tablet   No No   Sig: TAKE 1 TABLET DAILY   fluticasone-umeclidinium-vilanterol (Trelegy Ellipta) 200-62 5-25 mcg/actuation AEPB inhaler   No No   Sig: Inhale 1 puff daily Rinse mouth after use  levalbuterol (XOPENEX) 1 25 mg/0 5 mL nebulizer solution   No No   Sig: Take 0 5 mL (1 25 mg total) by nebulization 3 (three) times a day   lidocaine (LIDODERM) 5 %   No No   Sig: Apply 1 patch topically daily Remove & Discard patch within 12 hours   pantoprazole (PROTONIX) 40 mg tablet   No No   Sig: Take 1 tablet (40 mg total) by mouth daily in the early morning Do not start before November 23, 2022     polyethylene glycol (MIRALAX) 17 g packet   Yes No   Sig: Take 17 g by mouth daily as needed   rivaroxaban (XARELTO) 15 mg tablet   No No   Sig: Take 1 tablet (15 mg total) by mouth daily with breakfast   saccharomyces boulardii (FLORASTOR) 250 mg capsule   No No   Sig: Take 1 capsule (250 mg total) by mouth 2 (two) times a day for 28 days   senna (SENOKOT) 8 6 mg   No No   Sig: Take 1 tablet (8 6 mg total) by mouth daily at bedtime   sodium chloride, PF, 0 9 %   Yes No   Sig: Inject 10 mL into a catheter in a vein Three times a day      Facility-Administered Medications: None       Past Medical History:   Diagnosis Date   • Arthritis    • Atrial flutter (HCC)    • Chronic kidney disease     stage 3   • Coronary artery disease     2 stents   • Fluid retention    • Gout    • Heart disease    • Heart failure [facility])     pacemaker   • Hypertension    • Hyponatremia 2019   • Neurological disorder    • Pacemaker    • Pulmonary emphysema ([address] )    • Radiculopathy     last assessed 16    • Shortness of breath     exertion   • Sleep apnea     c pap       Past Surgical History:   Procedure Laterality Date   • ANGIOPLASTY      x2 2 stents and then replaced   • CARDIAC PACEMAKER PLACEMENT      pacemaker permanent placement dual chamber / last assessed 14 / implantation    • CARDIAC SURGERY      pacemaker   • CHOLECYSTECTOMY     • CORONARY ANGIOPLASTY WITH STENT PLACEMENT     • EPIDURAL BLOCK INJECTION N/A 2016    Procedure: BLOCK / INJECTION EPIDURAL STEROID LUMBAR  L4-5;  Surgeon: Delia Condon MD;  Location: [place] [address] MAIN OR;  Service:    • EPIDURAL BLOCK INJECTION N/A 2019    Procedure: L4 L5 Lumbar Epidural Steroid Injection;  Surgeon: Delia Condon MD;  Location: [place] [address] MAIN OR;  Service: Pain Management    • EYE SURGERY      cataract left   • HAND SURGERY     • HIP PINNING Left    • INSERT / REPLACE / REMOVE PACEMAKER     • IR TUNNELED CENTRAL LINE PLACEMENT  2022   • KNEE ARTHROSCOPY W/ MENISCAL REPAIR Left    • KNEE SURGERY     • LUMBAR EPIDURAL INJECTION N/A 2016    Procedure: BLOCK / INJECTION LUMBAR  L4-5  (C-ARM); Surgeon: Delia Condon MD;  Location: [facility] MAIN OR;  Service:    • MD OPTX FEM SHFT FX W/INSJ IMED IMPLT W/WO SCREW Right 2022    Procedure: INSERTION NAIL IM FEMUR ANTEGRADE (TROCHANTERIC); Surgeon: Brigitte Ho MD;  [facility];  Service: Orthopedics       Family History   Problem Relation Age of Onset   • Cancer Mother 80        Cancer when 80 yrs old   • Heart disease Mother    • Hypertension Mother    • Heart disease Father    • Cancer Father         Heart   46   • Diabetes Neg Hx    • Stroke Neg Hx      I have reviewed and agree with the history as documented      E-Cigarette/Vaping   • E-Cigarette Use Never User E-Cigarette/Vaping Substances   • Nicotine No    • THC No    • CBD No    • Flavoring No    • Other No    • Unknown No      Social History     Tobacco Use   • Smoking status: Every Day     Packs/day: 0 25     Years: 50 00     Pack years: 12 50     Types: Cigarettes     Start date: 1/1/2022   • Smokeless tobacco: Former   • Tobacco comments:     quit 02/14/2021   Vaping Use   • Vaping Use: Never used   Substance Use Topics   • Alcohol use: Never   • Drug use: No       Review of Systems   Constitutional: Positive for fatigue  Negative for diaphoresis and fever  HENT: Positive for congestion  Negative for facial swelling, sinus pressure, sinus pain, sneezing, sore throat, tinnitus and trouble swallowing  Eyes: Negative for photophobia, redness and visual disturbance  Respiratory: Positive for shortness of breath  Negative for cough, chest tightness and wheezing  Cardiovascular: Negative for chest pain, palpitations and leg swelling  Gastrointestinal: Negative for abdominal pain, diarrhea, nausea and vomiting  Endocrine: Negative for polydipsia, polyphagia and polyuria  Genitourinary: Negative for decreased urine volume, difficulty urinating, dysuria, flank pain and hematuria  Musculoskeletal: Negative for arthralgias, back pain, gait problem, neck pain and neck stiffness  Skin: Negative for color change, pallor and rash  Neurological: Positive for weakness  Negative for dizziness, tremors, seizures, syncope, speech difficulty, light-headedness, numbness and headaches  Hematological: Negative for adenopathy  Does not bruise/bleed easily  Psychiatric/Behavioral: Negative for agitation and behavioral problems  Physical Exam  Physical Exam  Vitals and nursing note reviewed  Constitutional:       General: He is not in acute distress  Appearance: He is well-developed  He is not ill-appearing, toxic-appearing or diaphoretic  HENT:      Head: Normocephalic and atraumatic        Right Ear: Ear canal normal       Left Ear: Ear canal normal       Nose: Nose normal  No congestion or rhinorrhea  Mouth/Throat:      Mouth: Mucous membranes are moist       Pharynx: No oropharyngeal exudate or posterior oropharyngeal erythema  Eyes:      General: No scleral icterus  Right eye: No discharge  Left eye: No discharge  Extraocular Movements: Extraocular movements intact  Pupils: Pupils are equal, round, and reactive to light  Neck:      Vascular: No carotid bruit  Cardiovascular:      Rate and Rhythm: Normal rate and regular rhythm  Heart sounds: Normal heart sounds  No murmur heard  No friction rub  No gallop  Pulmonary:      Effort: Pulmonary effort is normal  No respiratory distress  Breath sounds: Normal breath sounds  No wheezing, rhonchi or rales  Chest:      Chest wall: No tenderness  Abdominal:      General: Bowel sounds are normal  There is no distension  Palpations: Abdomen is soft  There is no mass  Tenderness: There is no abdominal tenderness  There is no right CVA tenderness, left CVA tenderness or guarding  Hernia: No hernia is present  Musculoskeletal:         General: No swelling, tenderness, deformity or signs of injury  Normal range of motion  Cervical back: Normal range of motion and neck supple  No rigidity or tenderness  Right lower leg: Edema present  Left lower leg: Edema present  Comments: Bilateral ankle edema ++   Skin:     General: Skin is warm and dry  Neurological:      Mental Status: He is alert and oriented to person, place, and time     Psychiatric:         Mood and Affect: Mood normal          Behavior: Behavior normal          Vital Signs  ED Triage Vitals   Temperature Pulse Respirations Blood Pressure SpO2   01/10/23 1955 01/10/23 1955 01/10/23 1955 01/10/23 1955 01/10/23 1955   97 8 °F (36 6 °C) 74 (!) 24 120/58 98 %      Temp Source Heart Rate Source Patient Position - Orthostatic VS BP Location FiO2 (%)   01/10/23 1955 01/10/23 1955 01/10/23 2201 01/10/23 2201 --   Oral Monitor Sitting Right arm       Pain Score       01/10/23 1955       7           Vitals:    01/11/23 0142 01/11/23 0400 01/11/23 0420 01/11/23 0915   BP:  102/60  112/59   Pulse: 77 83 79 83   Patient Position - Orthostatic VS:  Lying           Visual Acuity      ED Medications  Medications   lactated ringers infusion (50 mL/hr Intravenous Rate/Dose Change 1/11/23 0638)   acetylcysteine (MUCOMYST) 200 mg/mL inhalation solution 600 mg (600 mg Nebulization Given 1/11/23 0750)   allopurinol (ZYLOPRIM) tablet 200 mg (200 mg Oral Given 1/11/23 0918)   ceFAZolin (ANCEF) IVPB (premix in dextrose) 2,000 mg 50 mL (2,000 mg Intravenous New Bag 1/11/23 0517)   docusate sodium (COLACE) capsule 100 mg (100 mg Oral Given 1/11/23 0918)   DULoxetine (CYMBALTA) delayed release capsule 60 mg (60 mg Oral Given 1/11/23 0918)   finasteride (PROSCAR) tablet 5 mg (5 mg Oral Given 1/11/23 0918)   fluticasone-vilanterol 200-25 mcg/actuation 1 puff (1 puff Inhalation Given 1/11/23 0926)   levalbuterol (XOPENEX) inhalation solution 1 25 mg (1 25 mg Nebulization Given 1/11/23 0750)   pantoprazole (PROTONIX) EC tablet 40 mg (40 mg Oral Given 1/11/23 0517)   polyethylene glycol (MIRALAX) packet 17 g (has no administration in time range)   rivaroxaban (XARELTO) tablet 15 mg (15 mg Oral Given 1/11/23 0920)   saccharomyces boulardii (FLORASTOR) capsule 250 mg (250 mg Oral Given 1/11/23 0918)   senna (SENOKOT) tablet 8 6 mg (8 6 mg Oral Given 1/11/23 0243)   sodium chloride (PF) 0 9 % injection 10 mL (10 mL Intravenous Given 1/11/23 0600)   acetaminophen (TYLENOL) tablet 650 mg (650 mg Oral Given 1/11/23 0923)   umeclidinium 62 5 mcg/actuation inhaler AEPB 1 puff (1 puff Inhalation Given 1/11/23 0929)   potassium chloride (K-DUR,KLOR-CON) CR tablet 40 mEq (has no administration in time range)   potassium chloride (K-DUR,KLOR-CON) CR tablet 40 mEq (40 mEq Oral Given 1/10/23 2228)   potassium chloride 20 mEq IVPB (premix) (20 mEq Intravenous New Bag 1/10/23 2231)   ceFAZolin (ANCEF) IVPB (premix in dextrose) 2,000 mg 50 mL (0 mg Intravenous Stopped 1/10/23 2326)       Diagnostic Studies  Results Reviewed     Procedure Component Value Units Date/Time    HS Troponin I 2hr [[id number]]  (Abnormal) Collected: 01/11/23 0252    Lab Status: Final result Specimen: Blood from Arm, Right Updated: 01/11/23 0325     hs TnI 2hr 83 ng/L      Delta 2hr hsTnI -5 ng/L     Blood culture #1 [[id number]] Collected: 01/10/23 2128    Lab Status: Preliminary result Specimen: Blood from Arm, Right Updated: 01/11/23 0201     Blood Culture Received in Microbiology Lab  Culture in Progress  Blood culture #2 [[id number]] Collected: 01/10/23 2120    Lab Status: Preliminary result Specimen: Blood from Arm, Left Updated: 01/11/23 0201     Blood Culture Received in Microbiology Lab  Culture in Progress      HS Troponin 0hr (reflex protocol) [[id number]]  (Abnormal) Collected: 01/10/23 2329    Lab Status: Final result Specimen: Blood from Central Venous Line Updated: 01/11/23 0003     hs TnI 0hr 88 ng/L     UA w Reflex to Microscopic w Reflex to Culture [[id number]] Collected: 01/10/23 2304    Lab Status: Final result Specimen: Urine, Other Updated: 01/10/23 2309     Color, UA Light Yellow     Clarity, UA Clear     Specific Gravity, UA 1 010     pH, UA 7 0     Leukocytes, UA Negative     Nitrite, UA Negative     Protein, UA Negative mg/dl      Glucose, UA Negative mg/dl      Ketones, UA Negative mg/dl      Urobilinogen, UA 0 2 E U /dl      Bilirubin, UA Negative     Occult Blood, UA Negative    Comprehensive metabolic panel [[id number]]  (Abnormal) Collected: 01/10/23 2129    Lab Status: Final result Specimen: Blood from Arm, Left Updated: 01/10/23 2214     Sodium 130 mmol/L      Potassium 2 6 mmol/L      Chloride 89 mmol/L      CO2 31 mmol/L      ANION GAP 10 mmol/L      BUN 19 mg/dL      Creatinine 1 51 mg/dL      Glucose 126 mg/dL      Calcium 8 8 mg/dL      Corrected Calcium 10 1 mg/dL      AST 21 U/L      ALT 6 U/L      Alkaline Phosphatase 77 U/L      Total Protein 7 2 g/dL      Albumin 2 4 g/dL      Total Bilirubin 0 80 mg/dL      eGFR 42 ml/min/1 73sq m     Narrative:      [place] guidelines for Chronic Kidney Disease (CKD):   •  Stage 1 with normal or high GFR (GFR > 90 mL/min/1 73 square meters)  •  Stage 2 Mild CKD (GFR = 60-89 mL/min/1 73 square meters)  •  Stage 3A Moderate CKD (GFR = 45-59 mL/min/1 73 square meters)  •  Stage 3B Moderate CKD (GFR = 30-44 mL/min/1 73 square meters)  •  Stage 4 Severe CKD (GFR = 15-29 mL/min/1 73 square meters)  •  Stage 5 End Stage CKD (GFR <15 mL/min/1 73 square meters)  Note: GFR calculation is accurate only with a steady state creatinine    FLU/RSV/COVID - if FLU/RSV clinically relevant [[id number]]  (Normal) Collected: 01/10/23 2120    Lab Status: Final result Specimen: Nares from Nose Updated: 01/10/23 2210     SARS-CoV-2 Negative     INFLUENZA A PCR Negative     INFLUENZA B PCR Negative     RSV PCR Negative    Narrative:      FOR PEDIATRIC PATIENTS - copy/paste COVID Guidelines URL to browser: [email or web address]    SARS-CoV-2 assay is a Nucleic Acid Amplification assay intended for the  qualitative detection of nucleic acid from SARS-CoV-2 in nasopharyngeal  swabs  Results are for the presumptive identification of SARS-CoV-2 RNA  Positive results are indicative of infection with SARS-CoV-2, the virus  causing COVID-19, but do not rule out bacterial infection or co-infection  with other viruses  Laboratories within the [place] and its  territories are required to report all positive results to the appropriate  public health authorities   Negative results do not preclude SARS-CoV-2  infection and should not be used as the sole basis for treatment or other  patient management decisions  Negative results must be combined with  clinical observations, patient history, and epidemiological information  This test has not been FDA cleared or approved  This test has been authorized by FDA under an Emergency Use Authorization  (EUA)  This test is only authorized for the duration of time the  declaration that circumstances exist justifying the authorization of the  emergency use of an in vitro diagnostic tests for detection of SARS-CoV-2  virus and/or diagnosis of COVID-19 infection under section 564(b)(1) of  the Act, 21 U  S C  502ONQ-3(Y)(2), unless the authorization is terminated  or revoked sooner  The test has been validated but independent review by FDA  and CLIA is pending  Test performed using [employer] GeneXpert: This RT-PCR assay targets N2,  a region unique to SARS-CoV-2  A conserved region in the E-gene was chosen  for pan-Sarbecovirus detection which includes SARS-CoV-2  According to CMS-2020-01-R, this platform meets the definition of high-throughput technology  High Sensitivity Troponin I Random [[id number]]  (Abnormal) Collected: 01/10/23 2120    Lab Status: Final result Specimen: Blood from Arm, Left Updated: 01/10/23 2158     HS TnI random 92 ng/L     Lactic acid, plasma [[id number]]  (Normal) Collected: 01/10/23 2120    Lab Status: Final result Specimen: Blood from Arm, Left Updated: 01/10/23 2156     LACTIC ACID 1 4 mmol/L     Narrative:      Result may be elevated if tourniquet was used during collection      CBC and differential [[id number]]  (Abnormal) Collected: 01/10/23 2120    Lab Status: Final result Specimen: Blood from Arm, Left Updated: 01/10/23 2134     WBC 6 53 Thousand/uL      RBC 3 29 Million/uL      Hemoglobin 8 7 g/dL      Hematocrit 28 6 %      MCV 87 fL      MCH 26 4 pg      MCHC 30 4 g/dL      RDW 19 0 %      MPV 9 1 fL      Platelets 960 Thousands/uL      nRBC 0 /100 WBCs      Neutrophils Relative 78 %      Immat GRANS % 1 %      Lymphocytes Relative 11 %      Monocytes Relative 8 %      Eosinophils Relative 1 %      Basophils Relative 1 %      Neutrophils Absolute 5 21 Thousands/µL      Immature Grans Absolute 0 04 Thousand/uL      Lymphocytes Absolute 0 72 Thousands/µL      Monocytes Absolute 0 49 Thousand/µL      Eosinophils Absolute 0 04 Thousand/µL      Basophils Absolute 0 03 Thousands/µL                  XR chest portable   ED Interpretation by Lavelle Ji MD (01/10 2202)   CXR; Cardiomegely , pulmonary vascular congestion, pacemaker      Final Result by Cole Franco MD (01/11 0801)      Mild pulmonary venous congestion with small right effusion  Enlarging left lung nodules  Workstation performed: [id number]         CT head wo contrast   Final Result by uLis Stack MD (01/11 0016)      2 cm mass in the posterior left paramedian parietal lobe with adjacent vasogenic edema appears new from prior recent examinations  Finding is suspicious for intracranial infection/developing abscess given patient history although a metastatic lesion is    not excluded  Recommend follow-up contrast MRI brain with and without intravenous gadolinium contrast    No midline shift or significant associated mass effect at present  Above findings discussed with Dr Judy Kaba at 11:50 PM on 1/10/2023              Workstation performed: [id number]                    Procedures  ECG 12 Lead Documentation Only    Date/Time: 1/10/2023 9:47 PM  Performed by: Lavelle Ji MD  Authorized by: Lavelle Ji MD     Indications / Diagnosis:  Dizziness  Patient location:  ED and bedside  Interpretation:     Interpretation: abnormal    Rate:     ECG rate:  76    ECG rate assessment: normal    Rhythm:     Rhythm: junctional    Ectopy:     Ectopy: none    QRS:     QRS axis:  Normal  Conduction:     Conduction: abnormal      Abnormal conduction: non-specific intraventricular conduction delay    ST segments:     ST segments:  Non-specific  T waves:     T waves: non-specific               ED Course                               SBIRT 22yo+    Flowsheet Row Most Recent Value   SBIRT (23 yo +)    In order to provide better care to our patients, we are screening all of our patients for alcohol and drug use  Would it be okay to ask you these screening questions? No Filed at: 01/10/2023 2057                    Medical Decision Making  This is 80years old patient to be discharged from the hospital to home after we find that he has septicemia due to the MSSA patient started on cefazolin until 1/17/2022  Patient has approved for the injection  According to the wife he has dizziness he has altered mental status with being confused and lethargic  Patient has loss of appetite and has urinary and fecal incontinence  Patient became more short of breath and has pulse ox 86% on room air at home patient has fallen 2 times yesterday  Patient has progressive weakness  On exam patient is lethargic but he can wake up that he go back to sleep  Physical exam pertinent for altered mental status and confusion and bilateral ankle edema  CT of the head shows no acute findings no intracranial bleeding  CXR shows cardiomegaly and pulmonary vascular congestion and pacemaker  Blood work shows potassium 2 6 and increase of creatinine to 1 5  We will admit the patient for altered mental status hypokalemia and sepsis  Blood culture was sent from the ER  Altered mental status, unspecified altered mental status type: acute illness or injury  Hypokalemia: acute illness or injury  Multiple falls: acute illness or injury  Amount and/or Complexity of Data Reviewed  Labs: ordered  Decision-making details documented in ED Course  Details: K 2 6, creatinin 1 5  Radiology: ordered and independent interpretation performed  Details: CXR Cardiomegely and pulmonary congeston , pacemaker  ECG/medicine tests: ordered and independent interpretation performed       Details: EKG Accelerated junctional rhythm , ST &T wave abnormaliy and lateral ischmia      Risk  Prescription drug management  Disposition  Final diagnoses:    Altered mental status, unspecified altered mental status type   Hypokalemia   Multiple falls   Troponin level elevated     Time reflects when diagnosis was documented in both MDM as applicable and the Disposition within this note     Time User Action Codes Description Comment    1/10/2023 10:25 PM Es Mathew Add [R41 82] Altered mental status, unspecified altered mental status type     1/10/2023 10:26 PM Clement Matheweb Add [E87 6] Hypokalemia     1/10/2023 10:26 PM Clement Matheweb Add [R29 6] Multiple falls     1/11/2023 10:15 AM Cleemnt Matheweb Add [R77 8] Troponin level elevated       ED Disposition     ED Disposition   Admit    Condition   Stable    Date/Time   Tue Satinder 10, 2023 10:42 PM    Comment   Case was discussed with Efrem Reddy and the patient's admission status was agreed to be admitted med surg to the service of Dr Samantha Tse    None         Current Discharge Medication List      CONTINUE these medications which have NOT CHANGED    Details   ceFAZolin (ANCEF) 2000 mg IVPB Inject 2,000 mg into a catheter in a vein every 8 (eight) hours for 28 days  Refills: 0    Associated Diagnoses: MSSA bacteremia      acetylcysteine (MUCOMYST) 200 mg/mL nebulizer solution Take 3 mL (600 mg total) by nebulization 3 (three) times a day  Qty: 270 mL, Refills: 0    Associated Diagnoses: Centrilobular emphysema (HCC)      Albuterol Sulfate (albuterol, FOR EMS ONLY,) (2 5 mg/3 mL) 0 083 % nebulizer solution Take 2 5 mg by nebulization every 6 (six) hours as needed for wheezing      allopurinol (ZYLOPRIM) 100 mg tablet Take 2 tablets (200 mg total) by mouth daily  Qty: 180 tablet, Refills: 3    Associated Diagnoses: Hyperuricemia      docusate sodium (COLACE) 100 mg capsule Take 1 capsule (100 mg total) by mouth 2 (two) times a day  Refills: 0    Associated Diagnoses: Constipation      DULoxetine (CYMBALTA) 60 mg delayed release capsule TAKE 1 CAPSULE TWICE DAILY  Qty: 60 capsule, Refills: 1    Associated Diagnoses: Radiculopathy of lumbar region      finasteride (PROSCAR) 5 mg tablet TAKE 1 TABLET DAILY  Qty: 90 tablet, Refills: 1    Associated Diagnoses: Prostate disorder      fluticasone-umeclidinium-vilanterol (Trelegy Ellipta) 200-62 5-25 mcg/actuation AEPB inhaler Inhale 1 puff daily Rinse mouth after use  Qty: 60 each, Refills: 5    Associated Diagnoses: Centrilobular emphysema (HCC)      Heparin Sod, Pork, Lock Flush (heparin flush in sodium acetate 0 225%) 0 5 units/mL Inject into a catheter in a vein      levalbuterol (XOPENEX) 1 25 mg/0 5 mL nebulizer solution Take 0 5 mL (1 25 mg total) by nebulization 3 (three) times a day  Qty: 45 mL, Refills: 0    Associated Diagnoses: Centrilobular emphysema (HCC)      lidocaine (LIDODERM) 5 % Apply 1 patch topically daily Remove & Discard patch within 12 hours  Qty: 30 patch, Refills: 5    Associated Diagnoses: Strain of neck muscle, initial encounter      pantoprazole (PROTONIX) 40 mg tablet Take 1 tablet (40 mg total) by mouth daily in the early morning Do not start before November 23, 2022    Qty: 30 tablet, Refills: 0    Associated Diagnoses: Esophageal candidiasis (HCC)      polyethylene glycol (MIRALAX) 17 g packet Take 17 g by mouth daily as needed      rivaroxaban (XARELTO) 15 mg tablet Take 1 tablet (15 mg total) by mouth daily with breakfast    Associated Diagnoses: Chronic a-fib (HCC)      saccharomyces boulardii (FLORASTOR) 250 mg capsule Take 1 capsule (250 mg total) by mouth 2 (two) times a day for 28 days  Refills: 0    Associated Diagnoses: MSSA bacteremia      senna (SENOKOT) 8 6 mg Take 1 tablet (8 6 mg total) by mouth daily at bedtime  Refills: 0    Associated Diagnoses: Constipation      sodium chloride, PF, 0 9 % Inject 10 mL into a catheter in a vein Three times a day      Torsemide 40 MG TABS Take 40 mg by mouth 2 (two) times a day  Qty: 60 tablet, Refills: 0    Comments: Contact his cardiologist Dr NENA Hansen for future refills  Associated Diagnoses: Acute on chronic diastolic heart failure ([facility] [address] )             No discharge procedures on file      PDMP Review       Value Time User    PDMP Reviewed  Yes 2/19/2022 10:29 AM Mahogany Cordoba          ED Provider  Electronically Signed by           Nora Rollins MD  01/11/23 1016

## 2023-01-11 NOTE — ED NOTES
Pt restless and agitated requesting to get out of bed  This RN explained to pt due to his dizziness and mobility he cannot get out of bed at this time  Pt placed in sitting position  Pt c/o nausea and giving basin for vomiting  Pt yelled at this RN to "stay with me bedside do not leave my room"  This RN stayed in room for a couple of minutes then explained to pt that the provider needed an update on his condition  When this RN stated that I will follow up with him shortly pt yelled "I heard that before miss"  This RN explained to pt that no current orders are placed and that provider Solo Vasques needs to be informed of his condition so we can further treat him  Pt expressed understanding        Bonnie GonzalezR[facility]  01/10/23 3819

## 2023-01-11 NOTE — ASSESSMENT & PLAN NOTE
Lab Results   Component Value Date    EGFR 42 01/10/2023    EGFR 50 12/29/2022    EGFR 46 12/28/2022    CREATININE 1 51 (H) 01/10/2023    CREATININE 1 30 12/29/2022    CREATININE 1 39 (H) 12/28/2022   Baseline creatinine 1 3-1 6  · At baseline

## 2023-01-11 NOTE — CONSULTS
Consultation - Infectious Disease   Harini Deal 80 y o  male MRN: [id number]  Unit/Bed#: [address] [place] [address] Encounter: [id number]      IMPRESSION & RECOMMENDATIONS:   Impression/Recommendations:  1  Brain lesions, probable abscesses  Most likely complication of recent MSSA bacteremia, as below  Patient is unable to have MRI due to pacemaker  Fortunately, he is afebrile and clinically stable, with no meningismus or overt focal neurologic deficits     -Change antibiotic to IV nafcillin 2 g every 4 hours  -Recommend neurosurgery evaluation  -Follow-up pending blood cultures  -Serial neuro exams  -Will likely need to prolong treatment course    2  MSSA bacteremia  Positive blood cultures from 12/5/2022  Likely originated from skin breakdown on arms and legs  Complicated by presence of pacemaker  Recent MARY showed no definitive large vegetations but could not exclude small lesions  Recent CT chest showed numerous pulmonary nodules concerning for septic emboli  Family opted to pursue conservative approach with retention of pacemaker  Patient has been receiving outpatient cefazolin via PICC line  Now presents with confusion found to have #1  Patient is afebrile and clinically stable  -Antibiotic plan as above  -Follow-up new blood cultures from this admission to guide further recommendations    3  Pacemaker in place  As above, MARY showed no overt vegetations  However, high suspicion for pacer infection  Patient and family opted to retain pacer on last admission     -Antibiotic plan as above  -Would consider reevaluating patient for extraction in the future due to high suspicion for pacer infection  4   Bilateral pulmonary nodules  Likely septic emboli in the setting of recent MSSA bacteremia due to rapid changes noted on most recent CT  Less likely malignancy     -Antibiotic plan as above  -Will need follow-up imaging in the future reassess for treatment response    5  CKD 3    Creatinine remains relatively stable     -No dose adjustment needed for nafcillin  -Follow BMP    Antibiotics:  Cefazolin    I discussed above plan with patient and wife at bedside, and with Dr Coreen Holliday  I personally reviewed prior hospitalization records  Thank you for this consultation  We will follow along with you  HISTORY OF PRESENT ILLNESS:  Reason for Consult: Suspected brain abscess, recent MSSA bacteremia  HPI: Ford Wynn is a 80 y o  male with PPM, COPD, CHF, CKD, recent prolonged hospitalization x2 in December 2022 at [facility] due to MSSA bacteremia with concern for possible pulmonary septic emboli on imaging  MARY was negative but could not definitively rule out small lesion due to calcifications  After discussion regarding pacemaker removal, the decision was made by family to pursue conservative approach with IV antibiotic  Patient was discharged with PICC line and plan for IV cefazolin for 6-week course  Patient presented to ER on evening of 1/10 due to worsening confusion noted by wife at home, generalized weakness and 2 mechanical falls  No fevers, chills, focal weakness or numbness  Initial noncontrast CT head showed 2 cm mass in posterior left paramedian parietal lobe with adjacent vasogenic edema  CT head with contrast performed today showed enhancing lesion in left parasagittal parietal lobe, as well as right inferior paramedian cerebellar hemisphere with associated edema  REVIEW OF SYSTEMS:  A complete system-based review of systems is otherwise negative      PAST MEDICAL HISTORY:  Past Medical History:   Diagnosis Date   • Arthritis    • Atrial flutter ([facility] [address] )    • Chronic kidney disease     stage 3   • Coronary artery disease     2 stents   • Fluid retention    • Gout    • Heart disease    • Heart failure (HCC)     pacemaker   • Hypertension    • Hyponatremia 04/08/2019   • Neurological disorder    • Pacemaker    • Pulmonary emphysema (HCC)    • Radiculopathy     last assessed 1/28/16    • Shortness of breath     exertion   • Sleep apnea     c pap     Past Surgical History:   Procedure Laterality Date   • ANGIOPLASTY      x2 2 stents and then replaced   • CARDIAC PACEMAKER PLACEMENT      pacemaker permanent placement dual chamber / last assessed 4/7/14 / implantation    • CARDIAC SURGERY      pacemaker   • CHOLECYSTECTOMY     • CORONARY ANGIOPLASTY WITH STENT PLACEMENT     • EPIDURAL BLOCK INJECTION N/A 05/26/2016    Procedure: BLOCK / INJECTION EPIDURAL STEROID LUMBAR  L4-5;  Surgeon: Faisal Arredondo MD;  Location: [facility] MAIN OR;  Service:    • EPIDURAL BLOCK INJECTION N/A 02/14/2019    Procedure: L4 L5 Lumbar Epidural Steroid Injection;  Surgeon: Faisal Arredondo MD;  Location: [facility] MAIN OR;  Service: Pain Management    • EYE SURGERY      cataract left   • HAND SURGERY     • HIP PINNING Left    • INSERT / REPLACE / REMOVE PACEMAKER     • IR TUNNELED CENTRAL LINE PLACEMENT  12/12/2022   • KNEE ARTHROSCOPY W/ MENISCAL REPAIR Left    • KNEE SURGERY     • LUMBAR EPIDURAL INJECTION N/A 03/17/2016    Procedure: BLOCK / INJECTION LUMBAR  L4-5  (C-ARM); Surgeon: Faisal Arredondo MD;  Location: [facility] MAIN OR;  Service:    • NM OPTX FEM SHFT FX W/INSJ IMED IMPLT W/WO SCREW Right 01/31/2022    Procedure: INSERTION NAIL IM FEMUR ANTEGRADE (TROCHANTERIC); Surgeon: Kelechi Pierre MD;  Location: [facility] Main OR;  Service: Orthopedics       FAMILY HISTORY:  Non-contributory    SOCIAL HISTORY:  Social History     Substance and Sexual Activity   Alcohol Use Never     Social History     Substance and Sexual Activity   Drug Use No     Social History     Tobacco Use   Smoking Status Every Day   • Packs/day: 0 25   • Years: 50 00   • Pack years: 12 50   • Types: Cigarettes   • Start date: 1/1/2022   Smokeless Tobacco Former   Tobacco Comments    quit 02/14/2021       ALLERGIES:  No Known Allergies    MEDICATIONS:  All current active medications have been reviewed      PHYSICAL EXAM:  Vitals:  Temp:  [97 7 °F (36 5 °C)-97 9 °F (36 6 °C)] 97 7 °F (36 5 °C)  HR:  [74-83] 83  Resp:  [16-25] 16  BP: ()/(58-66) 112/59  SpO2:  [92 %-98 %] 96 %  Temp (24hrs), Av 8 °F (36 6 °C), Min:97 7 °F (36 5 °C), Max:97 9 °F (36 6 °C)  Current: Temperature: 97 7 °F (36 5 °C)     Physical Exam:  General: Elderly, debilitated, nontoxic, no apparent acute distress  Eyes:  Conjunctive clear with no hemorrhages or effusions  Oropharynx:  No ulcers, no lesions  Neck:  Supple, no lymphadenopathy  Lungs: Nonlabored respirations  Or site with no overlying erythema or tenderness  Abdomen:  Soft, non-tender, non-distended  Extremities:  No peripheral cyanosis, clubbing, or edema  Skin: Scattered bruising on extremities  Right-sided PICC line in place with no surrounding erythema or tenderness  Neurological:  Moves all four extremities spontaneously, no meningismus    LABS, IMAGING, & OTHER STUDIES:  Lab Results:  I have personally reviewed pertinent labs  Results from last 7 days   Lab Units 01/11/23  0522 01/10/23  2129   POTASSIUM mmol/L 2 9* 2 6*   CHLORIDE mmol/L 94* 89*   CO2 mmol/L 30 31   BUN mg/dL 18 19   CREATININE mg/dL 1 42* 1 51*   EGFR ml/min/1 73sq m 45 42   CALCIUM mg/dL 8 2* 8 8   AST U/L  --  21   ALT U/L  --  6*   ALK PHOS U/L  --  77     Results from last 7 days   Lab Units 01/11/23  0522 01/10/23  2120   WBC Thousand/uL 6 77 6 53   HEMOGLOBIN g/dL 8 1* 8 7*   PLATELETS Thousands/uL 180 190     Results from last 7 days   Lab Units 01/10/23  2128 01/10/23  2120   BLOOD CULTURE  Received in Microbiology Lab  Culture in Progress  Received in Microbiology Lab  Culture in Progress  Imaging Studies:   I have personally reviewed pertinent imaging study reports and images in PACS  CT head with contrast shows peripherally enhancing lesion within the left parasagittal parietal lobe with surrounding vasogenic edema    Interval development of ill-defined enhancing lesion within the right inferior paramedian cerebellar hemisphere with mild associated edema  Findings suggest infection with abscess formation versus metastatic disease  EKG, Pathology, and Other Studies:   I have personally reviewed pertinent reports

## 2023-01-11 NOTE — PHYSICAL THERAPY NOTE
PHYSICAL THERAPY EVALUATION/TREATMENT     01/11/23 1010   PT Last Visit   PT Visit Date 01/11/23   Note Type   Note type Evaluation   Pain Assessment   Pain Assessment Tool 0-10   Pain Score No Pain   Restrictions/Precautions   Weight Bearing Precautions Per Order No   Other Precautions Chair Alarm; Bed Alarm;O2;Fall Risk   Home Living   Type of [address] [facility] One level;Stairs to enter with rails   Bathroom Equipment Grab bars in shower; Shower chair;Commode;Grab bars around toilet   [address] [facility]; Wheelchair-manual   Prior Function   Level of [place] Needs assistance with ADLs; Needs assistance with functional mobility; Needs assistance with IADLS   Lives With Spouse  (and two daughters)   Receives Help From Family   IADLs Family/Friend/Other provides transportation; Family/Friend/Other provides meals; Family/Friend/Other provides medication management   General   Additional Pertinent History Pt was recently d/c from [facility] with recommendation for STR, however pt declined  Pt returned home with subseqent falls  Family/Caregiver Present No   Cognition   Overall Cognitive Status WFL   Arousal/Participation Cooperative  (lethargic once in bed)   Orientation Level Oriented X4   Following Commands Follows one step commands with increased time or repetition   Subjective   Subjective Pt wants to go back to bed after bath in chair by CNA  Pt does not want to use the walker for transfer   RLE Assessment   RLE Assessment WFL  (Strength: 3- to 3/5)   LLE Assessment   LLE Assessment WFL  (Strength: 3- to 3/5)   Bed Mobility   Sit to Supine 2  Maximal assistance   Additional items Assist x 2;LE management   Additional Comments pt presents in chair   Transfers   Sit to Stand 2  Maximal assistance   Additional items Assist x 2; Increased time required;Verbal cues  (with at least 2 attempts to stand from chair)   Stand to Sit 3  Moderate assistance   Additional items Assist x 2   Additional Comments Only able to take a few steps from chair > bed   Ambulation/Elevation   Gait Assistance Not tested   Ambulation/Elevation Additional Comments too weak   Balance   Static Sitting Fair   Dynamic Sitting Fair -   Static Standing Poor   Dynamic Standing Poor -   Activity Tolerance   Activity Tolerance Patient limited by fatigue;Treatment limited secondary to medical complications (Comment)   Nurse Made Aware yes: June   Assessment   Problem List Decreased strength;Decreased endurance; Impaired balance;Decreased mobility; Impaired judgement;Decreased safety awareness; Obesity   Assessment Patient seen for Physical Therapy evaluation  Patient admitted with Metabolic encephalopathy  Comorbidities affecting patient's physical performance include: CAD>2 stents, Afib, HTN, CKD, pacemaker, sleep apnea: cpap  Personal factors affecting patient at time of initial evaluation include: lives in single story house, ambulating with assistive device, stairs to enter home, inability to ambulate household distances, inability to navigate community distances, inability to navigate level surfaces without external assistance, limited home support, positive fall history, inability to perform physical activity, inability to perform ADLS, inability to perform IADLS  and inability to live alone  Prior to admission, patient was requiring assist for functional mobility with rolling walker or wheelchair, requiring assist for ADLS, requiring assist for IADLS, living with wife and two daughters in a single level home with 2 steps to enter, ambulating household distance and home with family assist   Please find objective findings from Physical Therapy assessment regarding body systems outlined above with impairments and limitations including weakness, impaired balance, decreased endurance, gait deviations, decreased activity tolerance, decreased functional mobility tolerance, decreased safety awareness, impaired judgement and fall risk    The Barthel Index was used as a functional outcome tool presenting with a score of Barthel Index Score: 20 today indicating marked limitations of functional mobility and ADLS  Patient's clinical presentation is currently unstable/unpredictable as seen in patient's presentation of vital sign response, varying levels of cognitive performance, increased fall risk, new onset of impairment of functional mobility, decreased endurance and new onset of weakness  Pt would benefit from continued Physical Therapy treatment to address deficits as defined above and maximize level of functional mobility  As demonstrated by objective findings, the assigned level of complexity for this evaluation is high  The patient's AM-PAC Basic Mobility Inpatient Short Form Raw Score is 8  A Raw score of less than or equal to 16 suggests the patient may benefit from discharge to post-acute rehabilitation services  Please also refer to the recommendation of the Physical Therapist for safe discharge planning  Goals   Patient Goals to get into bed (immediate goal)  none stated for long term   STG Expiration Date 01/18/23   Short Term Goal #1 Min A for transfers supine <> short sit and Mod A for sit <> stand with use of RW   Short Term Goal #2 Min/Mod A for amb  with RW for 20 feet with fair balance or better   LTG Expiration Date 01/25/23   Long Term Goal #1 Indep transfers supine <> short sit and sit <> stand with use of RW   Long Term Goal #2 Min A /Supervision for amb  with RW for 50 feet with changes in direction and fair + balance or better   Plan   Treatment/Interventions ADL retraining;Functional transfer training;LE strengthening/ROM; Therapeutic exercise; Endurance training;Patient/family training;Equipment eval/education; Bed mobility;Gait training;Spoke to MD;Spoke to nursing;Spoke to case management   PT Frequency Other (Comment)  (5/wk)   Recommendation   PT Discharge Recommendation Post acute rehabilitation services   Additional Comments Pt was very fatigued, could barely transfers sit <> stand via a momentum type transfer with increased verbal cues to lean nose over his toes  2 attempts made to complete  Once in standing, required Max A and increased verbal and tactile cues to pivot from chair > bed  Once in bed, able to complete active assist BLE strengthening exercises with frequent cues : heel slides, hip ab/adduction, hip IR/ER with legs straight on bed, pt declined APs   "I don't want to do them"    A: tolerated fair  Pt is profoundly weak and generally deconditioned  Would require STR prior to return home  P; Cont  as per plan to increase strength and funcitonal mobiltiy   AM-PAC Basic Mobility Inpatient   Turning in Flat Bed Without Bedrails 2   Lying on Back to Sitting on Edge of Flat Bed Without Bedrails 2   Moving Bed to Chair 1   Standing Up From Chair Using Arms 1   Walk in Room 1   Climb 3-5 Stairs With Railing 1   Basic Mobility Inpatient Raw Score 8   Turning Head Towards Sound 3   Follow Simple Instructions 3   Low Function Basic Mobility Raw Score 14   Low Function Basic Mobility Standardized Score 22 01   Highest Level Of Mobility   -[facility] Goal 3: Sit at edge of bed   -HL Achieved 4: Move to chair/commode   Barthel Index   Feeding 5   Bathing 0   Grooming Score 0   Dressing Score 0   Bladder Score 0   Bowels Score 10   Toilet Use Score 0   Transfers (Bed/Chair) Score 5   Mobility (Level Surface) Score 0   Stairs Score 0   Barthel Index Score 20   Additional Treatment Session   Start Time 1000   End Time 1010   Treatment Assessment Pt was very fatigued, could barely transfers sit <> stand via a momentum type transfer with increased verbal cues to lean nose over his toes  2 attempts made to complete  Once in standing, required Max A and increased verbal and tactile cues to pivot from chair > bed    Once in bed, able to complete active assist BLE strengthening exercises with frequent cues : heel slides, hip ab/adduction, hip IR/ER with legs straight on bed, pt declined APs   "I don't want to do them"    A: tolerated fair  Pt is profoundly weak and generally deconditioned  Would require STR prior to return home  P; Cont  as per plan to increase strength and funcitonal mobiltiy   End of Consult   Patient Position at End of Consult Supine;Bed/Chair alarm activated; All needs within reach   Licensure   [address] [facility]  Evi Roberts PT  [phone]

## 2023-01-11 NOTE — CASE MANAGEMENT
Case Management Assessment & Discharge Planning Note    Patient name Sanya Hilton  Location [address] [facility] 416/4 Jamilah-* MRN [id number]  : 1940 Date 2023       Current Admission Date: 1/10/2023  Current Admission Diagnosis:Metabolic encephalopathy   Patient Active Problem List    Diagnosis Date Noted   • Metabolic encephalopathy    • Influenza 2022   • Anemia 2022   • Depression with anxiety 2022   • MSSA bacteremia 2022   • Abnormal CAT scan 2022   • Elevated troponin 2022   • Left hip pain 2022   • Cervical strain 2022   • Lump of skin of left lower extremity 2022   • Primary osteoarthritis of both knees 2022   • Depression 2022   • Generalized weakness 2022   • Valvular heart disease 2022   • Pressure injury of right buttock, unstageable ([facility] ) 2022   • Ambulatory dysfunction 2022   • Dysphagia, pharyngoesophageal phase 2022   • Gastro-esophageal reflux disease without esophagitis 2022   • Generalized muscle weakness 2022   • History of falling 2022   • Iron deficiency anemia 2022   • Major depressive disorder, recurrent, unspecified ([facility] ) 2022   • Moderate to severe aortic stenosis 2022   • Other abnormalities of gait and mobility 2022   • Other specified polyneuropathies 2022   • Pulmonary hypertension due to left heart disease ([facility] ) 2022   • Presence of coronary angioplasty implant and graft 2022   • Unspecified hearing loss, unspecified ear 2022   • Falls 2022   • Closed fracture of right femur ([facility] ) 2022   • COPD (chronic obstructive pulmonary disease) ([facility] ) 2021   • Acute on chronic diastolic heart failure ([facility] ) 2021   • Memory difficulty 2021   • Neuropathy 2021   • Coronary artery arteriosclerosis 2021   • Essential hypertension 2021   • Dyspnea on exertion    • Idiopathic hematuria 04/01/2021   • Kidney stone on left side 04/01/2021   • Flank pain 04/01/2021   • Obesity (BMI 30-39 9) 04/01/2021   • Chronic constipation 02/16/2021   • Vitamin D insufficiency 11/13/2020   • Symptomatic anemia 09/13/2020   • Former smoker 01/13/2020   • Nephrolithiasis 10/30/2019   • Bilateral leg edema 09/26/2019   • Hyperuricemia 07/22/2019   • Mixed simple and mucopurulent chronic bronchitis ([facility] ) 07/17/2019   • Hypokalemia 06/24/2019   • CAD (coronary artery disease) 05/13/2019   • Status post primary angioplasty with coronary stent 05/13/2019   • Stage 3a chronic kidney disease ([facility] ) 04/18/2019   • Hyponatremia 04/08/2019   • Serum total bilirubin elevated 04/08/2019   • Peripheral arteriosclerosis ([facility] ) 01/03/2019   • Pain in both lower extremities 01/31/2018   • Benign hypertension with chronic kidney disease, stage III ([facility] ) 06/27/2017   • JOO (generalized anxiety disorder) 06/07/2016   • Chronic pain syndrome 03/17/2016   • Bilateral lumbar radiculopathy 03/17/2016   • Lumbar canal stenosis 03/17/2016   • Difficulty in walking 09/11/2015   • Persistent proteinuria 01/08/2015   • Urinary incontinence 10/30/2014   • Aneurysm of abdominal aorta 04/07/2014   • Centrilobular emphysema ([facility] ) 04/07/2014   • Deep venous insufficiency 04/07/2014   • Hyperlipidemia 04/07/2014   • Pacemaker 04/07/2014   • Fibromyalgia 08/08/2013   • 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/2013   • Fecal soiling 03/26/2013   • Class 2 obesity due to excess calories without serious comorbidity with body mass index (BMI) of 36 0 to 36 9 in adult 03/26/2013   • Chronic a-fib ([facility] ) 01/23/2013   • Allergic rhinitis 10/01/2012   • Benign prostatic hyperplasia 09/04/2012   • Carpal tunnel syndrome 09/04/2012   • Moderate or severe vision impairment, one eye 09/04/2012   • Smoker 09/04/2012   • JOVI (obstructive sleep apnea) 09/04/2012   • Venous insufficiency (chronic) (peripheral) 09/04/2012      LOS (days): 1  Geometric Mean LOS (GMLOS) (days): 8 10  Days to GMLOS:7 5     OBJECTIVE:  PATIENT READMITTED TO HOSPITAL  Risk of Unplanned Readmission Score: 37 27       Current admission status: Inpatient  Referral Reason: Other (Discharge planning)    Preferred Pharmacy:   [address] [place], [address] [facility] [address] [facility] [address] [facility],  [address] [address]  Phone: [phone] Fax: [phone]    [facility], [address] [facility] [address] [place] [address]  Phone: [phone] Fax: [phone]    [facility] [address] [facility], [address] [place] [address]  Phone: [phone] Fax: [phone]    Primary Care Provider: Riaz Lopes DO    Primary Insurance: MEDICARE  Secondary Insurance: COMMERCIAL MISCELLANEOUS    ASSESSMENT:  [address] [facility], [address] [facility]   Primary Phone: [phone] (Mobile)                Readmission Root Cause  30 Day Readmission: Yes  Who directed you to return to the hospital?: Family  Did you understand whom to contact if you had questions or problems?: Yes  Did you get your prescriptions before you left the hospital?: No  Reason[de-identified] Not preferred pharmacy  Were you able to get your prescriptions filled when you left the hospital?: Yes  Did you take your medications as prescribed?: Yes  Were you able to get to your follow-up appointments?: Yes  During previous admission, was a post-acute recommendation made?: Yes  What post-acute resources were offered?: STR, Offered, but declined (Pt went home with VNA and home infusion)  Patient was readmitted due to: metabolic encephalopathy, dizziness, falls  Action Plan: medical management - referrals for STR placed, family to decide on home vs STR pending progress    Patient Information  Admitted from[de-identified] Home  Mental Status: Alert, Confused  During Assessment patient was accompanied by: Spouse  Assessment information provided by[de-identified] Spouse  Primary Caregiver: Spouse  Caregiver's Name[de-identified] Jacob Peña (spouse)  Caregiver's Relationship to Patient[de-identified] Family Member  Caregiver's Telephone Number[de-identified] [phone]  Support Systems: Spouse/significant other, Daughter  [place] of Residence: [address] [place] [address] do you live in?: [place] [address] entry access options   Select all that apply : Stairs  Number of steps to enter home : 2  Type of Current Residence: 2 story home  Upon entering residence, is there a bedroom on the main floor (no further steps)?: Yes  Upon entering residence, is there a bathroom on the main floor (no further steps)?: Yes  In the last 12 months, was there a time when you were not able to pay the mortgage or rent on time?: No  In the last 12 months, how many places have you lived?: 1  In the last 12 months, was there a time when you did not have a steady place to sleep or slept in a shelter (including now)?: No  Homeless/housing insecurity resource given?: N/A  Living Arrangements: Lives w/ Spouse/significant other, Lives w/ Daughter (2 daughters live in home with patient and his wife)    Activities of Daily Living Prior to Admission  Functional Status: Assistance  Completes ADLs independently?: No  Level of ADL dependence: Assistance  Ambulates independently?: No  Level of ambulatory dependence: Assistance  Does patient use assisted devices?: Yes  Assisted Devices (DME) used: Bedside Commode, Walker, Wheelchair  Does patient currently own DME?: Yes  What DME does the patient currently own?: Bedside Commode, Elmer Bash, Wheelchair  Does patient have a history of Outpatient Therapy (PT/OT)?: No  Does the patient have a history of Short-Term Rehab?: Yes ([facility], [address] )  Does patient have a history of HHC?: Yes ([facility])  Does patient currently have [address]?: Yes ([facility])    [address] [place] [address]  Type of Current Home Care Services: Nurse visit, Home PT  Current Home Health Agency[de-identified] [address] [facility] Provider[de-identified] PCP    Patient Information Continued  Income Source: Pension/FCI  Does patient have prescription coverage?: Yes  Within the past 12 months, you worried that your food would run out before you got the money to buy more : Never true  Within the past 12 months, the food you bought just didn't last and you didn't have money to get more : Never true  Food insecurity resource given?: N/A  Does patient receive dialysis treatments?: No  Does patient have a history of substance abuse?: No  Does patient have a history of Mental Health Diagnosis?: No    Means of Transportation  Means of Transport to Appts[de-identified] Family transport  In the past 12 months, has lack of transportation kept you from medical appointments or from getting medications?: No  In the past 12 months, has lack of transportation kept you from meetings, work, or from getting things needed for daily living?: No  Was application for public transport provided?: N/A    DISCHARGE DETAILS:    Discharge planning discussed with[de-identified] Wife Author Hoffman of Choice: Yes     Comments - Freedom of Choice: FOC reviewed with wife Chitra Stoll at bedside  Family's preference is for patient to return home at discharge but Chitra Stoll was open to blanket referrals for STR as recommended by treatment team   She requested that referrals not be sent to [facility] or [facility] placed referral in [address]   also placed referral for [facility] with [facility] in [address] as family may opt to take patient home instead of STR       CM contacted family/caregiver?: Yes  Were Treatment Team discharge recommendations reviewed with patient/caregiver?: Yes  Did patient/caregiver verbalize understanding of patient care needs?: Yes  Were patient/caregiver advised of the risks associated with not following Treatment Team discharge recommendations?: Yes    Contacts  Patient Contacts: Beverley Son (spouse)  Relationship to Patient[de-identified] Family  Contact Method:  In Person  Reason/Outcome: Continuity of Care, Discharge Planning, Emergency Contact, Referral    Requested [address]         Is the patient interested in [facility] at discharge?: Yes  [address] requested[de-identified] Nursing, Physical [address] Name[de-identified] [address] Provider[de-identified] PCP  Home Health Services Needed[de-identified] Evaluate Functional Status and Safety, Gait/ADL Training, Strengthening/Theraputic Exercises to Improve Function, Administration of IV, IM or SC Medications, COPD Management  Homebound Criteria Met[de-identified] Requires the Assistance of Another Person for Safe Ambulation or to Leave the Home, Uses an Assist Device (i e  cane, walker, etc)  Supporting Clincal Findings[de-identified] Fatigues Easliy in Short Distances, Limited Endurance    DME Referral Provided  Referral made for DME?: No    Other Referral/Resources/Interventions Provided:  Interventions: C, Short Term Rehab    Treatment Team Recommendation: Short Term Rehab  Discharge Destination Plan[de-identified] Home with [address], Short Term Rehab         IMM Given (Date):: 01/10/23

## 2023-01-11 NOTE — DISCHARGE INSTR - OTHER ORDERS
Plan:   Skin care Plan:    1-Cleanse sacro-buttocks with soap and water  Apply Allevyn foam or equivalent  Jean Marie with T for treatment  Change every two days and PRN  check skin q-shift  2-Cleanse skin tears to bilateral arms and wound to left dorsal foot with NSS & pat dry  Open small Dermagran square and apply to open areas in a single layer cut to size of wounds  Cover with ABD or gauze, kia & tape  Change every other day & prn soilage/dislodgement  3-Turn/reposition q2h or when medically stable for pressure re-distribution on skin   4-Float heels on 2 pillows so heels are not in contact with mattress or pillows to offload pressure  5-Moisturize skin daily with skin nourishing cream  6-Pressure redistribution cushion in chair when out of bed  7-Hydraguard to bilateral heels BID and PRN

## 2023-01-11 NOTE — H&P
[id number] [facility] 1940, 80 y o  male MRN: [id number]  Unit/Bed#: [address] [place] [address] Encounter: [id number]  Primary Care Provider: Ryan Resendiz DO   Date and time admitted to hospital: 1/10/2023  2:10 PM    * Metabolic encephalopathy  Assessment & Plan  Patient presents with 5 days of confusion, generalized weakness, decreased oral intake  · CT head: 2 cm mass in the posterior left paramedian parietal lobe with adjacent vasogenic edema appears new from prior recent examinations  Finding is suspicious for intracranial infection/developing abscess given patient history although a metastatic lesion is not excluded  No midline shift or significant associated mass effect at present    · Was admitted 12/22 - CT head showed Persistent small patchy subcortical white matter hypodensities involving the high right frontoparietal, right lateral frontal and right posterior parietal regions concerning for vasogenic edema related to metastatic foci, CT head with contrast not concerning for abscess  · Unable to get MRI due to pacemaker, consider CT head with contrast  · Na 130, K 2 6  · Check UA  · Repeat blood cultures  · Gentle IVFs overnight  · Replete electrolytes      MSSA bacteremia  Assessment & Plan  Found to have MSSA bacteremia  · Continue IV ancef 2g q8 for 6 weeks through 1/17/23  · TTE,MARY done on prior admission    Elevated troponin  Assessment & Plan  Initial troponin 92, continue to trend  · No chest pain or EKG changes  · Tele    Falls  Assessment & Plan  Patient with 2 mechanical falls at home yesterday  · Fall precautions   · PT/OT    COPD (chronic obstructive pulmonary disease) ([facility] [address] )  Assessment & Plan  No exacerbation, stable on room air  · On trelegy, substituted with breo and incruse  · Continue scheduled nebulizer treatments      Hypokalemia  Assessment & Plan  Potassium 2 6  · Given potassium repletion in ED  · Recheck in AM    Stage 3a chronic kidney disease [facility])  Assessment & Plan  Lab Results   Component Value Date    EGFR 42 01/10/2023    EGFR 50 12/29/2022    EGFR 46 12/28/2022    CREATININE 1 51 (H) 01/10/2023    CREATININE 1 30 12/29/2022    CREATININE 1 39 (H) 12/28/2022   Baseline creatinine 1 3-1 6  · At baseline    Hyponatremia  Assessment & Plan  Sodium 130  · Secondary to decreased oral intake, dehydration? · Continue gentle IVFs overnight  · Recheck in Am    JOVI (obstructive sleep apnea)  Assessment & Plan  JOVI on cpap hs    Chronic a-fib (HCC)  Assessment & Plan  S/p pacemaker placement  · Continue xarelto    VTE Pharmacologic Prophylaxis: VTE Score: 6 High Risk (Score >/= 5) - Pharmacological DVT Prophylaxis Ordered: apixaban (Eliquis)  Sequential Compression Devices Ordered  Code Status: Full code  Discussion with family: Updated  (wife) at bedside  Anticipated Length of Stay: Patient will be admitted on an inpatient basis with an anticipated length of stay of greater than 2 midnights secondary to encephalopathy, hypokalemia  Total Time for Visit, including Counseling / Coordination of Care: 45 minutes Greater than 50% of this total time spent on direct patient counseling and coordination of care  Chief Complaint: falls    History of Present Illness:  Lillie Henley is a 80 y o  male with a PMH of CAD, a fib, HTN, CKD who presents with multiple fall at home  Patient presents with 2 mechanical falls at home yesterday  He also reports getting lightheaded with standing often and has not been eating, drinking very much for the last few days  Wife notes he has periods of confusion for the past 5 days and generalized weakness  Was recently admitted with flu A, mssa bacteremia on ancef 2g q8  Has been getting antibiotics at home  Denies any fevers, chills, chest pain, SOB, cough, nausea/vomiting, abdominal pain  Review of Systems:  Review of Systems   Constitutional: Positive for appetite change  Negative for chills and fever  Respiratory: Negative for cough and shortness of breath  Cardiovascular: Negative for chest pain and palpitations  Gastrointestinal: Negative for abdominal pain and vomiting  Genitourinary: Negative for dysuria and hematuria  Skin: Negative for color change and rash  Neurological: Positive for weakness and light-headedness  Negative for syncope  Past Medical and Surgical History:   Past Medical History:   Diagnosis Date   • Arthritis    • Atrial flutter ([facility] [address] )    • Chronic kidney disease     stage 3   • Coronary artery disease     2 stents   • Fluid retention    • Gout    • Heart disease    • Heart failure (HCC)     pacemaker   • Hypertension    • Hyponatremia 04/08/2019   • Neurological disorder    • Pacemaker    • Pulmonary emphysema ([facility] [address] )    • Radiculopathy     last assessed 1/28/16    • Shortness of breath     exertion   • Sleep apnea     c pap       Past Surgical History:   Procedure Laterality Date   • ANGIOPLASTY      x2 2 stents and then replaced   • CARDIAC PACEMAKER PLACEMENT      pacemaker permanent placement dual chamber / last assessed 4/7/14 / implantation    • CARDIAC SURGERY      pacemaker   • CHOLECYSTECTOMY     • CORONARY ANGIOPLASTY WITH STENT PLACEMENT     • EPIDURAL BLOCK INJECTION N/A 05/26/2016    Procedure: BLOCK / INJECTION EPIDURAL STEROID LUMBAR  L4-5;  Surgeon: Merari Couch MD;  Location: [facility] MAIN OR;  Service:    • EPIDURAL BLOCK INJECTION N/A 02/14/2019    Procedure: L4 L5 Lumbar Epidural Steroid Injection;  Surgeon: Merari Couch MD;  Location: [facility] MAIN OR;  Service: Pain Management    • EYE SURGERY      cataract left   • HAND SURGERY     • HIP PINNING Left    • INSERT / REPLACE / REMOVE PACEMAKER     • IR TUNNELED CENTRAL LINE PLACEMENT  12/12/2022   • KNEE ARTHROSCOPY W/ MENISCAL REPAIR Left    • KNEE SURGERY     • LUMBAR EPIDURAL INJECTION N/A 03/17/2016    Procedure: BLOCK / INJECTION LUMBAR  L4-5  (C-ARM);   Surgeon: Merari Couch MD;  Location: [facility] MAIN OR;  Service:    • [place] OPTX FEM SHFT FX W/INSJ IMED IMPLT W/WO SCREW Right 01/31/2022    Procedure: INSERTION NAIL IM FEMUR ANTEGRADE (TROCHANTERIC); Surgeon: Darline De Luna MD;  Location: [facility] Main OR;  Service: Orthopedics       Meds/Allergies:  Prior to Admission medications    Medication Sig Start Date End Date Taking? Authorizing Provider   ceFAZolin (ANCEF) 2000 mg IVPB Inject 2,000 mg into a catheter in a vein every 8 (eight) hours for 28 days 12/20/22 1/17/23 Yes Darek Rodriguez MD   acetylcysteine (MUCOMYST) 200 mg/mL nebulizer solution Take 3 mL (600 mg total) by nebulization 3 (three) times a day 12/30/22   NAVA aCrvajal   Albuterol Sulfate (albuterol, FOR EMS ONLY,) (2 5 mg/3 mL) 0 083 % nebulizer solution Take 2 5 mg by nebulization every 6 (six) hours as needed for wheezing    Historical Provider, MD   allopurinol (ZYLOPRIM) 100 mg tablet Take 2 tablets (200 mg total) by mouth daily 1/15/22   Alexandria Sierra MD   docusate sodium (COLACE) 100 mg capsule Take 1 capsule (100 mg total) by mouth 2 (two) times a day 12/20/22   Darek Rodriguez MD   DULoxetine (CYMBALTA) 60 mg delayed release capsule TAKE 1 CAPSULE TWICE DAILY 9/26/22   Sharda Burnham DPM   finasteride (PROSCAR) 5 mg tablet TAKE 1 TABLET DAILY 9/24/22   Randy Counter, DO   fluticasone-umeclidinium-vilanterol (Trelegy Ellipta) 114-71 7-69 mcg/actuation AEPB inhaler Inhale 1 puff daily Rinse mouth after use   12/30/22   NAVA Carvajal   Heparin Sod, Pork, Lock Flush (heparin flush in sodium acetate 0 225%) 0 5 units/mL Inject into a catheter in a vein    Historical Provider, MD   levalbuterol (XOPENEX) 1 25 mg/0 5 mL nebulizer solution Take 0 5 mL (1 25 mg total) by nebulization 3 (three) times a day 12/30/22   Jeffory Medford, CRNP   lidocaine (LIDODERM) 5 % Apply 1 patch topically daily Remove & Discard patch within 12 hours 1/3/23   Randy Counter, DO   pantoprazole (PROTONIX) 40 mg tablet Take 1 tablet (40 mg total) by mouth daily in the early morning Do not start before November 23, 2022 11/23/22   Martha Wade MD   polyethylene glycol (MIRALAX) 17 g packet Take 17 g by mouth daily as needed    Historical Provider, MD   rivaroxaban (XARELTO) 15 mg tablet Take 1 tablet (15 mg total) by mouth daily with breakfast 4/13/19   Lynn Montano,    saccharomyces boulardii (FLORASTOR) 250 mg capsule Take 1 capsule (250 mg total) by mouth 2 (two) times a day for 28 days 12/20/22 1/17/23  Martha Wade MD   senna (SENOKOT) 8 6 mg Take 1 tablet (8 6 mg total) by mouth daily at bedtime 12/20/22   Martha Wade MD   sodium chloride, PF, 0 9 % Inject 10 mL into a catheter in a vein Three times a day 12/29/22   Historical Provider, MD   Torsemide 40 MG TABS Take 40 mg by mouth 2 (two) times a day 12/29/22   Calista Escobar DO   atorvastatin (LIPITOR) 40 mg tablet Take 1 tablet (40 mg total) by mouth daily with dinner  Patient not taking: Reported on 1/3/2023 12/20/22 1/10/23  Martha Wade MD   gabapentin (NEURONTIN) 100 mg capsule TAKE ONE CAPSULE BY MOUTH AT BEDTIME  Patient not taking: Reported on 1/3/2023 12/9/22 1/10/23  Caremateus Cousin, DPM   methocarbamol (ROBAXIN) 500 mg tablet Take 1 tablet (500 mg total) by mouth every 6 (six) hours as needed for muscle spasms (neck pain)  Patient not taking: Reported on 1/3/2023 12/20/22 1/10/23  Martha Wade MD   sodium chloride 0 9 % nebulizer solution Take 3 mL by nebulization 3 (three) times a day  Patient not taking: Reported on 1/3/2023 12/20/22 1/10/23  Martha Wade MD     I have reviewed home medications with patient family member      Allergies: No Known Allergies    Social History:  Marital Status: /Civil Union   Occupation:   Patient Pre-hospital Living Situation: Home  Patient Pre-hospital Level of Mobility: walks with walker  Patient Pre-hospital Diet Restrictions:   Substance Use History:   Social History     Substance and Sexual Activity Alcohol Use Never     Social History     Tobacco Use   Smoking Status Every Day   • Packs/day: 0 25   • Years: 50 00   • Pack years: 12 50   • Types: Cigarettes   • Start date: 2022   Smokeless Tobacco Former   Tobacco Comments    quit 2021     Social History     Substance and Sexual Activity   Drug Use No       Family History:  Family History   Problem Relation Age of Onset   • Cancer Mother 80        Cancer when 80 yrs old   • Heart disease Mother    • Hypertension Mother    • Heart disease Father    • Cancer Father         Heart   46   • Diabetes Neg Hx    • Stroke Neg Hx        Physical Exam:     Vitals:   Blood Pressure: 98/60 (23)  Pulse: 77 (23)  Temperature: 97 8 °F (36 6 °C) (23)  Temp Source: Oral (23)  Respirations: 20 (23)  Height: 6' 2" (188 cm) (23)  Weight - Scale: 112 kg (246 lb) (23)  SpO2: 97 % (23)    Physical Exam  Vitals reviewed  Constitutional:       General: He is not in acute distress  Appearance: He is well-developed  He is obese  HENT:      Head: Normocephalic and atraumatic  Eyes:      Conjunctiva/sclera: Conjunctivae normal    Cardiovascular:      Rate and Rhythm: Normal rate and regular rhythm  Heart sounds: No murmur heard  Pulmonary:      Effort: Pulmonary effort is normal  No respiratory distress  Breath sounds: Decreased breath sounds present  Comments: 94% on room air  Abdominal:      Palpations: Abdomen is soft  Tenderness: There is no abdominal tenderness  Musculoskeletal:         General: No swelling  Comments: +1 pitting edema   Skin:     General: Skin is warm and dry  Capillary Refill: Capillary refill takes less than 2 seconds  Neurological:      General: No focal deficit present  Mental Status: He is alert  Cranial Nerves: No cranial nerve deficit  Motor: No weakness        Comments: Oriented to person, time Psychiatric:         Mood and Affect: Mood normal           Additional Data:     Lab Results:  Results from last 7 days   Lab Units 01/10/23  2120   WBC Thousand/uL 6 53   HEMOGLOBIN g/dL 8 7*   HEMATOCRIT % 28 6*   PLATELETS Thousands/uL 190   NEUTROS PCT % 78*   LYMPHS PCT % 11*   MONOS PCT % 8   EOS PCT % 1     Results from last 7 days   Lab Units 01/10/23  2129   SODIUM mmol/L 130*   POTASSIUM mmol/L 2 6*   CHLORIDE mmol/L 89*   CO2 mmol/L 31   BUN mg/dL 19   CREATININE mg/dL 1 51*   ANION GAP mmol/L 10   CALCIUM mg/dL 8 8   ALBUMIN g/dL 2 4*   TOTAL BILIRUBIN mg/dL 0 80   ALK PHOS U/L 77   ALT U/L 6*   AST U/L 21   GLUCOSE RANDOM mg/dL 126                 Results from last 7 days   Lab Units 01/10/23  2120   LACTIC ACID mmol/L 1 4       Lines/Drains:  Invasive Devices     Central Venous Catheter Line  Duration           CVC Central Lines 12/12/22 Single Right Subclavian 29 days          Peripheral Intravenous Line  Duration           Peripheral IV 01/10/23 Left Antecubital <1 day                Central Line:  Goal for removal: Continue for long term IV antibiotics            Imaging: Reviewed radiology reports from this admission including: CT head  XR chest portable   ED Interpretation by Reanna Rankin MD (01/10 2202)   CXR; Cardiomegely , pulmonary vascular congestion, pacemaker      CT head wo contrast   Final Result by Nivia Hensley MD (01/11 0016)      2 cm mass in the posterior left paramedian parietal lobe with adjacent vasogenic edema appears new from prior recent examinations  Finding is suspicious for intracranial infection/developing abscess given patient history although a metastatic lesion is    not excluded  Recommend follow-up contrast MRI brain with and without intravenous gadolinium contrast    No midline shift or significant associated mass effect at present  Above findings discussed with Dr Chio Goodman at 11:50 PM on 1/10/2023              Workstation performed: [id number] EKG and Other Studies Reviewed on Admission:   · EKG: NSR  HR 80     ** Please Note: This note has been constructed using a voice recognition system   **

## 2023-01-11 NOTE — PLAN OF CARE
Problem: Potential for Falls  Goal: Patient will remain free of falls  Description: INTERVENTIONS:  - Educate patient/family on patient safety including physical limitations  - Instruct patient to call for assistance with activity   - Consult OT/PT to assist with strengthening/mobility   - Keep Call bell within reach  - Keep bed low and locked with side rails adjusted as appropriate  - Keep care items and personal belongings within reach  - Initiate and maintain comfort rounds  - Make Fall Risk Sign visible to staff  - Offer Toileting every 2 Hours, in advance of need  - Initiate/Maintain bed alarm  - Obtain necessary fall risk management equipment:   - Apply yellow socks and bracelet for high fall risk patients  - Consider moving patient to room near nurses station  Outcome: Progressing     Problem: MOBILITY - ADULT  Goal: Maintains/Returns to pre admission functional level  Description: INTERVENTIONS:  - Perform BMAT or MOVE assessment daily    - Set and communicate daily mobility goal to care team and patient/family/caregiver  - Collaborate with rehabilitation services on mobility goals if consulted  - Perform Range of Motion 3 times a day  - Reposition patient every 2 hours    - Dangle patient 3 times a day  - Stand patient 3 times a day  - Ambulate patient 3 times a day  - Out of bed to chair 3 times a day   - Out of bed for meals 3 times a day  - Out of bed for toileting  - Record patient progress and toleration of activity level   Outcome: Progressing     Problem: RESPIRATORY - ADULT  Goal: Achieves optimal ventilation and oxygenation  Description: INTERVENTIONS:  - Assess for changes in respiratory status  - Assess for changes in mentation and behavior  - Position to facilitate oxygenation and minimize respiratory effort  - Oxygen administered by appropriate delivery if ordered  - Initiate smoking cessation education as indicated  - Encourage broncho-pulmonary hygiene including cough, deep breathe, Incentive Spirometry  - Assess the need for suctioning and aspirate as needed  - Assess and instruct to report SOB or any respiratory difficulty  - Respiratory Therapy support as indicated  Outcome: Progressing     Problem: INFECTION - ADULT  Goal: Absence or prevention of progression during hospitalization  Description: INTERVENTIONS:  - Assess and monitor for signs and symptoms of infection  - Monitor lab/diagnostic results  - Monitor all insertion sites, i e  indwelling lines, tubes, and drains  - Monitor endotracheal if appropriate and nasal secretions for changes in amount and color  - Carbon appropriate cooling/warming therapies per order  - Administer medications as ordered  - Instruct and encourage patient and family to use good hand hygiene technique  - Identify and instruct in appropriate isolation precautions for identified infection/condition  Outcome: Progressing     Problem: INFECTION - ADULT  Goal: Absence of fever/infection during neutropenic period  Description: INTERVENTIONS:  - Monitor WBC    Outcome: Progressing     Problem: DISCHARGE PLANNING  Goal: Discharge to home or other facility with appropriate resources  Description: INTERVENTIONS:  - Identify barriers to discharge w/patient and caregiver  - Arrange for needed discharge resources and transportation as appropriate  - Identify discharge learning needs (meds, wound care, etc )  - Arrange for interpretive services to assist at discharge as needed  - Refer to Case Management Department for coordinating discharge planning if the patient needs post-hospital services based on physician/advanced practitioner order or complex needs related to functional status, cognitive ability, or social support system  Outcome: Progressing     Problem: Knowledge Deficit  Goal: Patient/family/caregiver demonstrates understanding of disease process, treatment plan, medications, and discharge instructions  Description: Complete learning assessment and assess knowledge base    Interventions:  - Provide teaching at level of understanding  - Provide teaching via preferred learning methods  Outcome: Progressing

## 2023-01-11 NOTE — ASSESSMENT & PLAN NOTE
No exacerbation, stable on room air  · On trelegy, substituted with breo and incruse  · Continue scheduled nebulizer treatments

## 2023-01-11 NOTE — ASSESSMENT & PLAN NOTE
Found to have MSSA bacteremia  · Continue IV ancef 2g q8 for 6 weeks through 1/17/23  · TTE,MARY done on prior admission

## 2023-01-11 NOTE — WOUND OSTOMY CARE
Progress Note - Wound   Cody Grijalva 80 y o  male MRN: [id number]  Unit/Bed#: [address] [place] [address] Encounter: [id number]      Assessment: This is an 80year old male patient admitted on 5/28/89 with metabolic encephalopathy  He has a history of COPD, CKD 3a, JOVI, HTN and frequent falls  He was awake, alert & oriented x 2, constantly trying to get out of bed and somewhat combative  He has a condom catheter in place but was trying to dislodge it  He has not had a BM yet  Wound care visit unable to be completed due to patient being taken for CT scan  Assessment Findings:  1-Multiple partial thickness skin tears to bilateral arms - orders in place for wound care  Please do not apply any dressings with adhesive including adhesive foam and tape as they will cause traumatic removal   2-Patient has open area to right sacrum which was present on admission and was not seen today d/t patient leaving for CT scan  This appears to be partial thickness from photo taken admission  Please do not stage this wound as it is most likely due to friction shear and moisture  3-Patient has full thickness wound to left dorsal foot with pink granulation tissue and yellow slough  This was not seen today due to patient leaving for CT scan but was seen on photo taken on admission  The periwound is discolored which appears to be due to contact dermatitis  Orders in place for wound care  Plan:   Skin care Plan:  1-Cleanse sacro-buttocks with soap and water  Apply Allevyn foam  Jean Marie with T for treatment  Change every two days and PRN  check skin q-shift  June RN notified that sacral foam dressing needs to be applied upon patient's return from CT scan  2-Cleanse skin tears to bilateral arms and wound to left dorsal foot with NSS & pat dry  Open small Dermagran square and apply to open areas in a single layer cut to size of wounds  Cover with ABD or gauze, kia & tape  Change every other day & prn soilage/dislodgement    3-Turn/reposition q2h or when medically stable for pressure re-distribution on skin   4-Float heels on 2 pillows so heels are not in contact with mattress or pillows to offload pressure  5-Moisturize skin daily with skin nourishing cream  6-Pressure redistribution cushion in chair when out of bed  7-Hydraguard to bilateral heels BID and PRN  Wound 12/24/22 Skin Tear Arm Posterior;Right; Lower (Active)   Wound Image   01/11/23 1029   Wound Length (cm) 0 cm 01/11/23 1029   Wound Width (cm) 0 cm 01/11/23 1029   Wound Depth (cm) 0 cm 01/11/23 1029   Wound Surface Area (cm^2) 0 cm^2 01/11/23 1029   Wound Volume (cm^3) 0 cm^3 01/11/23 1029   Calculated Wound Volume (cm^3) 0 cm^3 01/11/23 1029       Wound 01/11/23 Skin Tear Arm Right;Upper (Active)   Wound Image   01/11/23 0100   Wound Description Epithelialization (healed) 01/11/23 1029   Wound Length (cm) 0 cm 01/11/23 1029   Wound Width (cm) 0 cm 01/11/23 1029   Wound Depth (cm) 0 cm 01/11/23 1029   Wound Surface Area (cm^2) 0 cm^2 01/11/23 1029   Wound Volume (cm^3) 0 cm^3 01/11/23 1029   Calculated Wound Volume (cm^3) 0 cm^3 01/11/23 1029   Dressing Foam, Silicon (eg  Allevyn, etc) 01/11/23 1029   Dressing Status Clean;Dry; Intact 01/11/23 1029       Wound 01/11/23 Skin Tear Hand Posterior;Right (Active)   Wound Image   01/11/23 1029   Wound Description Granulation tissue; Beefy red 01/11/23 1029   Wound Length (cm) 0 2 cm 01/11/23 1029   Wound Width (cm) 2 cm 01/11/23 1029   Wound Depth (cm) 0 1 cm 01/11/23 1029   Wound Surface Area (cm^2) 0 4 cm^2 01/11/23 1029   Wound Volume (cm^3) 0 04 cm^3 01/11/23 1029   Calculated Wound Volume (cm^3) 0 04 cm^3 01/11/23 1029   Treatments Cleansed 01/11/23 1029   Dressing Laron Post gauze;Gauze;Dry dressing 01/11/23 1029   Wound packed? No 01/11/23 1029   Dressing Changed New 01/11/23 1029   Dressing Status Clean;Dry; Intact; Removed 01/11/23 1029         Discussed assessment findings, and plan of care/recommendations with Jaz GARCIA   She is aware that dressing changes need to be done (except for right hand dressing) and all supplies at patient's bedside  Wound care will follow along with patient throughout admission, please call or tiger text with questions and concerns    Recommendations written as orders    Madelin Medicine RN, BSN, [employer]

## 2023-01-11 NOTE — ASSESSMENT & PLAN NOTE
Patient presents with 5 days of confusion, generalized weakness, decreased oral intake  · CT head: 2 cm mass in the posterior left paramedian parietal lobe with adjacent vasogenic edema appears new from prior recent examinations  Finding is suspicious for intracranial infection/developing abscess given patient history although a metastatic lesion is not excluded  No midline shift or significant associated mass effect at present    · Was admitted 12/22 - CT head showed Persistent small patchy subcortical white matter hypodensities involving the high right frontoparietal, right lateral frontal and right posterior parietal regions concerning for vasogenic edema related to metastatic foci, CT head with contrast not concerning for abscess  · Unable to get MRI due to pacemaker, consider CT head with contrast  · Na 130, K 2 6  · Check UA  · Repeat blood cultures  · Gentle IVFs overnight  · Replete electrolytes

## 2023-01-12 ENCOUNTER — TELEPHONE (OUTPATIENT)
Dept: OTHER | Facility: OTHER | Age: 83
End: 2023-01-12

## 2023-01-12 PROBLEM — G93.41 METABOLIC ENCEPHALOPATHY: Status: ACTIVE | Noted: 2023-01-01

## 2023-01-12 NOTE — CONSULTS
Telemedicine consult    1/11/2022 initial     Arabella Marquez " Moose"    Age 80  years    Date of birth 1940    Location [address] [facility]    Discussed with Ramona Mclain DO by TT and by telephone several occasions over the day    Dr Mikayla Isaac from [facility]  requesting transfer to [facility] but daughter Db Gonzalez is hesitant  --   see conversation below    I would support transfer as patient optimally needs stereotactic aspiration of left occipital brain abscess via high lateral sagittal parietal approach to avoid primary visual cortex    We are asked to see to regarding new finding of left occipital brain abscess in the setting of refractory MSSA bacteremia    Also secondary centrally concerning additional abscess/esion high-grade evolving brain abscess in right/medial cerebellum indenting fourth ventricle and extending out along the foramen of Luschka    This is likely secondary to retained "scarred in" and infected pacemaker -placed for sick sinus syndrome, 15 years ago, MRI compatible,  and-deemed unsafe to remove    So evidence of recurrent septic partially treated and newly evolving lung nodules likely affected emboli    No past history of malignancy    Diagnoses:  1  Difficult to eradicate MSSA bacteremia over 5 weeks  2  Was due to stop antibiotics shortly but readmitted to [facility]  3  On Ancef via PICC line -being switched to Nafcillin per ID  4  Will now be on IV nafcillin 2 g every 4 hours  5  Presentation [facility] 12/05/2022  6  Hypertension  7  CKD 3  8  Atrial fibrillation on Xarelto -will need to be stopped if patient becomes agreeable to stereotactic biopsy of cerebral abscess  9  Is 2 23  10  Noncompatible MRI pacemaker placed 15 years  11  Infected pacemaker leads - "too scarred in" to be removed safely per cardiology  12  Negative MARY for vegetations so far  13   Multiple pulmonary nodules some decreasing some increasing on serial CT scans chest measuring consistent with possibly treated and recurrent infected pulmonary emboli  14  COPD  13  JOVI  16  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 on contrast CT scan  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  18  Using walker for 1 year  19  ESR 56  20  ALP 20    Not reported to be diabetic  Status post influenza A 12/24/2022    Per provider report:    Patient was discharged with PICC line and plan for IV cefazolin for 6-week course  Patient presented back o ER on evening of 1/10 due to worsening confusion noted by wife at home, generalized weakness and 2 mechanical falls  No fevers, chills, focal weakness or numbness  Per provider report: exam  Confused GCS 14  EOM full  Pupils 3 mm to 2 mm    Visual fields not examined at this time    Generalized weakness 4+ out of 5 possibly secondary to effort    New foot ulcer    Afebrile vital signs stable  Pacemaker    WBC 6 77  Globin 8 1 and platelets 039 K  BC pending one set  Sodium 135 potassium 2 9 creatinine 1 42    Available history past medical history family history social history reviewed    Imaging reviewed by me and later discussed with Dr Caitlin Chacon from neuroradiology     Initial noncontrast CT head showed 2 cm mass in posterior left paramedian OCCIPITAL lobe with adjacent vasogenic edema  This is a cerebral abscess with well-formed capsule     CT head with contrast performed tod 01/11/2023 showed enhancing lesion in left parasagittal OCCIPITAL lobe, as well as right inferior paramedian cerebellar hemisphere pushing into fourth ventricle and extending out along the right foramen of Luschka with associated edema  There is mainly solid and partially necrotic component likely also to be evolving abscess    However await CT chest abdomen pelvis with IV contrast to evaluate for any primary malignancy elsewhere    I discussed Mr Eugene's condition and management of of left occipital lobe cerebral brain abscess with Dr Gayla Patel internist at [address] [facility] had extensive conversation with his daughter Roberto Burch last night     Mr Mayuri Cedillo is a 80-year-old man on Xarelto for AF and is status  post PPM     Neurosurgery was asked to see and comment on management of left occipital cerebral abscess and also possible right medial cerebellar cerebellar abscess     Mr Fito Downey is very sick man with multiple medical problems and failing prior antibiotic management for MSSA bacteremia     Apparently was relatively independent at home with her attending to his own ADLs until December 3, 2022     Several admissions to [facility] since December 5, 2022 -  first admission 12/05/2022 for MSSA bacteremia      He has MSSA bacteremia last identified 12/05/2022 and has had showers of infected emboli going to the lungs and other major organ systems        Some of the infected lung parenchyma nodules have regressed and others have increased in size on serial CT chest imaging     No ongoing process     He has longstanding major medical problems  He is a very sick man      PMH: Hypertension atrial fibrillation and PPM for for sick sinus syndrome CHF and CKD     Placement of PPM 15 years ago       He has atrial fibrillation and on Xarelto     He has a pacemaker placed 15 years ago and non  MRI compatible  Scarred in leads within the heart      MARY reportedly negative     SH: 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, [employer]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        He lives with wife and daughter and uses a walker for the last year       He has peripheral neuropathy but he is not a diabetic      Gradually slowed up on the last 3 to 4 months     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  This cannot be removed with high risk of cardiac rupture and complications      So decision was made not to do this     He has ongoing bacteremia  Likely continuing to be bacteremic  Difficult ituation to manage     Failing Ancef treatment   Dr Parveen Streeter from ID switching to Nafcillin     Has secondary complication with left occipital brain abscess      This is moderately sizable and established and has complete former capsule on contrast imaging      Managing this by an IV antibiotics not likely to succeed     Can be tried  Likely still has active organisms within abscess cavity     We would advise transfer to B and stereotactic aspiration and drainage after he was taken off Xarelto     Could need 2 or 3 separate procedures of aspiration at weekly intervals depending on progress of abscess     There are risks to coming off Xarelto in his case      Stability of of major stroke is in the 7 to 10% range     There is also also uncertain mass in the medial right cerebellar lobe with extension of process into the right foramen of Luschka  Hydrocephalus     No obvious leptomeningeal enhancement at this time     There is a solid and low-density componen to right medial cerebellar mass  Could also be developing abscess     L indicated she realized her father was quite sick      She was reluctant to consider possibility of anesthesia and brain surgery with stereotactic aspiration of abscess      She was hopeful that change to more "powerful" antibiotic may turn things around abscess may come under control and then gradually shrink     Weekly has had focal area of embolization and cerebritis for 10 days or longer   But now with established capsule     Occipital abscess is noted immediately adjacent to the ventral     If the occipital abscess mass mass got bigger and did not with active organisms and leaked into the atrium of the left lateral ventricle ventricle he would have infected ventriculitis which would likely be fatal     Need of IV antibiotics can be tried but without surgical drainage uncertain future of control of abscess getting larger and extending     The meantime would continue with further work-up to reevaluate him in total again     Recommend  1  Repeat blood cultures x 2 sets  2  MARY again  3   CT chest abdomen pelvis with IV to reevaluate infected lung nodules evaluate liver and spleen for abscesses and review of  the spine for osteomyelitis -treated on 01/12/2023    4  He has extensive lumbar DJD with marked reduction of disc space at L2-3  5  Baseline ESR CRP  6  Continue with high-dose nafcillin  7  Repeat brain CT scan with and without contrast next in 5-7 days  8  Follow exam closely for homonymous hemianopsia -initial finding may be right temporal field cut               I spent a total of 50 minutes evaluating past history researching his chart reviewing current examination and history since December 5, 2022 and reviewing serial and current imaging myself and later with Dr Bijal Aguilar from Neuroradiology and reviewing prior cardiac evaluation and review ID recommendations and making recommendations on ongoing optimal management    1/12/2023 5:38 PM    Bayron Savage MD, Attending Neurological Surgeon

## 2023-01-12 NOTE — ASSESSMENT & PLAN NOTE
Patient presented with 5 days of confusion, generalized weakness, decreased oral intake  · CT head: 2 cm mass in the posterior left paramedian parietal lobe with adjacent vasogenic edema which is new  intracranial infection/developing abscess vs mets  · Stat CT head with IV contrast was obtained which shows enhancing lesion within left parasagittal parietal lobe with vasogenic edema and enhancing lesion within right inferior paramedian cerebellar hemisphere static lesion  No midline shift noted -infection with abscess formation versus metastatic disease but most likely infectious in etiology  · Unable to get MRI due to pacemaker  · Coordinated care with radiology, ID, neurosurgery multiple times  Patient is on nafcillin now  · Per neurosurgery, Will obtain CT chest/abdomen/pelvis with IV contrast to rule out malignancy/spinal mets  Check ESR, CRP at baseline   · Neurosurgery recommended transferring patient over to [place] for possible stereotactic biopsy/aspiration of mass  Discussed with patient's daughter who also then spoke with neurosurgery and they would like to hold off on any diagnostic testing  Patient's daughter prefers continuing treatment with antibiotics with serial imaging to see if the lesions eventually decrease in size or resolve  Spoke with daughter that even if they do not want any diagnostic testing at this time it is still advisable that patient be transferred to [facility] when bed available for evaluation and monitoring by neurosurgery in case his condition worsens or develops neurological symptoms and also as he will need follow-up even after discharge discussed with her that transfer there would also mean that if they decide to pursue diagnostic evaluation, it will be feasible there    She would like to speak with the rest of the family and get back to us by tomorrow  · Repeat blood cultures

## 2023-01-12 NOTE — PLAN OF CARE
Problem: Potential for Falls  Goal: Patient will remain free of falls  Description: INTERVENTIONS:  - Educate patient/family on patient safety including physical limitations  - Instruct patient to call for assistance with activity   - Consult OT/PT to assist with strengthening/mobility   - Keep Call bell within reach  - Keep bed low and locked with side rails adjusted as appropriate  - Keep care items and personal belongings within reach  - Initiate and maintain comfort rounds  - Make Fall Risk Sign visible to staff  - Obtain necessary fall risk management equipment  - Apply yellow socks and bracelet for high fall risk patients  - Consider moving patient to room near nurses station  Outcome: Progressing     Problem: MOBILITY - ADULT  Goal: Maintain or return to baseline ADL function  Description: INTERVENTIONS:  -  Assess patient's ability to carry out ADLs; assess patient's baseline for ADL function and identify physical deficits which impact ability to perform ADLs (bathing, care of mouth/teeth, toileting, grooming, dressing, etc )  - Assess/evaluate cause of self-care deficits   - Assess range of motion  - Assess patient's mobility; develop plan if impaired  - Assess patient's need for assistive devices and provide as appropriate  - Encourage maximum independence but intervene and supervise when necessary  - Involve family in performance of ADLs  - Assess for home care needs following discharge   - Consider OT consult to assist with ADL evaluation and planning for discharge  - Provide patient education as appropriate  Outcome: Progressing  Goal: Maintains/Returns to pre admission functional level  Description: INTERVENTIONS:  - Perform BMAT or MOVE assessment daily    - Set and communicate daily mobility goal to care team and patient/family/caregiver     - Collaborate with rehabilitation services on mobility goals if consulted  - Perform Range of Motion   - Reposition patient   - Dangle patient   - Stand patient - Ambulate patient   - Out of bed to chair   - Out of bed for meals   - Out of bed for toileting  - Record patient progress and toleration of activity level   Outcome: Progressing     Problem: RESPIRATORY - ADULT  Goal: Achieves optimal ventilation and oxygenation  Description: INTERVENTIONS:  - Assess for changes in respiratory status  - Assess for changes in mentation and behavior  - Position to facilitate oxygenation and minimize respiratory effort  - Oxygen administered by appropriate delivery if ordered  - Initiate smoking cessation education as indicated  - Encourage broncho-pulmonary hygiene including cough, deep breathe, Incentive Spirometry  - Assess the need for suctioning and aspirate as needed  - Assess and instruct to report SOB or any respiratory difficulty  - Respiratory Therapy support as indicated  Outcome: Progressing     Problem: INFECTION - ADULT  Goal: Absence or prevention of progression during hospitalization  Description: INTERVENTIONS:  - Assess and monitor for signs and symptoms of infection  - Monitor lab/diagnostic results  - Monitor all insertion sites, i e  indwelling lines, tubes, and drains  - Monitor endotracheal if appropriate and nasal secretions for changes in amount and color  - Decker appropriate cooling/warming therapies per order  - Administer medications as ordered  - Instruct and encourage patient and family to use good hand hygiene technique  - Identify and instruct in appropriate isolation precautions for identified infection/condition  Outcome: Progressing  Goal: Absence of fever/infection during neutropenic period  Description: INTERVENTIONS:  - Monitor WBC    Outcome: Progressing     Problem: DISCHARGE PLANNING  Goal: Discharge to home or other facility with appropriate resources  Description: INTERVENTIONS:  - Identify barriers to discharge w/patient and caregiver  - Arrange for needed discharge resources and transportation as appropriate  - Identify discharge learning needs (meds, wound care, etc )  - Arrange for interpretive services to assist at discharge as needed  - Refer to Case Management Department for coordinating discharge planning if the patient needs post-hospital services based on physician/advanced practitioner order or complex needs related to functional status, cognitive ability, or social support system  Outcome: Progressing     Problem: Knowledge Deficit  Goal: Patient/family/caregiver demonstrates understanding of disease process, treatment plan, medications, and discharge instructions  Description: Complete learning assessment and assess knowledge base    Interventions:  - Provide teaching at level of understanding  - Provide teaching via preferred learning methods  Outcome: Progressing     Problem: Prexisting or High Potential for Compromised Skin Integrity  Goal: Skin integrity is maintained or improved  Description: INTERVENTIONS:  - Identify patients at risk for skin breakdown  - Assess and monitor skin integrity  - Assess and monitor nutrition and hydration status  - Monitor labs   - Assess for incontinence   - Turn and reposition patient  - Assist with mobility/ambulation  - Relieve pressure over bony prominences  - Avoid friction and shearing  - Provide appropriate hygiene as needed including keeping skin clean and dry  - Evaluate need for skin moisturizer/barrier cream  - Collaborate with interdisciplinary team   - Patient/family teaching  - Consider wound care consult   Outcome: Progressing

## 2023-01-12 NOTE — ASSESSMENT & PLAN NOTE
Sodium 130 --> normalized today  · Secondary to decreased oral intake, dehydration  · Discontinued gentle IVFs overnight  · Recheck in Am    Results from last 7 days   Lab Units 01/11/23  0522 01/10/23  2129   SODIUM mmol/L 135 130*

## 2023-01-12 NOTE — TELEPHONE ENCOUNTER
Patient is calling regarding cancelling an appointment      Date/Time: 01/16/2023 @ 11:00 am with Manolo Olivas PA-C    Patient was rescheduled: YES [] NO [x]    Patient requesting call back to reschedule: YES [] NO [x]

## 2023-01-12 NOTE — PLAN OF CARE
Problem: Potential for Falls  Goal: Patient will remain free of falls  Description: INTERVENTIONS:  - Educate patient/family on patient safety including physical limitations  - Instruct patient to call for assistance with activity   - Consult OT/PT to assist with strengthening/mobility   - Keep Call bell within reach  - Keep bed low and locked with side rails adjusted as appropriate  - Keep care items and personal belongings within reach  - Initiate and maintain comfort rounds  - Make Fall Risk Sign visible to staff  - Offer Toileting every 2 Hours, in advance of need  - Initiate/Maintain bed chair/ alarm  - Obtain necessary fall risk management equipment: walker  - Apply yellow socks and bracelet for high fall risk patients  - Consider moving patient to room near nurses station  Outcome: Progressing

## 2023-01-12 NOTE — PROGRESS NOTES
Progress Note - Infectious Disease   Doran Sample 80 y o  male MRN: [id number]  Unit/Bed#: [address] [facility] 416-01 Encounter: [id number]      Impression/Plan:  1  Brain lesions, probable abscesses  Most likely complication of recent MSSA bacteremia, as below  Patient is unable to have MRI due to pacemaker  Fortunately, he is afebrile and clinically stable, with no meningismus or overt focal neurologic deficits  -continue change in antibiotic to IV nafcillin 2 g every 4 hours starting 1/11/23  -appreciate neurosurgery evaluation  -Follow-up pending blood cultures  -Serial neuro exams  -Will likely need to prolong treatment course     2  MSSA bacteremia  Positive blood cultures from 12/5/2022  Likely originated from skin breakdown on arms and legs  Complicated by presence of pacemaker  Recent MARY showed no definitive large vegetations but could not exclude small lesions  Recent CT chest showed numerous pulmonary nodules concerning for septic emboli  Family opted to pursue conservative approach with retention of pacemaker  Patient has been receiving outpatient cefazolin via PICC line prior to this admission  1/10/23 he presented with confusion found to have #1  Patient is afebrile and clinically stable  -Antibiotic plan as above  -Follow-up new blood cultures from this admission to guide further recommendations     3  Pacemaker in place  As above, MARY showed no overt vegetations  However, high suspicion for pacer infection  Patient and family opted to retain pacer on last admission   -Antibiotic plan as above  -Would consider reevaluating patient for extraction in the future due to high suspicion for pacer infection      4   Bilateral pulmonary nodules    Likely septic emboli in the setting of recent MSSA bacteremia with some nodules enlarged while others decrease in size on serial CTs most recent on 1/12/23  -Antibiotic plan as above  -Will need continued follow-up imaging in the future reassess for treatment response     5  CKD 3  Creatinine remains relatively stable   -No dose adjustment needed for nafcillin  -Follow BMP     Antibiotics:  Nafcillin D2      Above impression and plan discussed in detail with patient, RN, and primary care team     Subjective:  Patient has no fever, chills, sweats; no nausea, vomiting, diarrhea; no cough, shortness of breath; no focal pain at present  Denies headache  Appears to be tolerating antibiotic    Objective:  Vitals:  Temp:  [97 7 °F (36 5 °C)-98 5 °F (36 9 °C)] 98 5 °F (36 9 °C)  HR:  [78-83] 83  Resp:  [18] 18  BP: (111-131)/(57-77) 123/60  SpO2:  [92 %-98 %] 93 %  Temp (24hrs), Av °F (36 7 °C), Min:97 7 °F (36 5 °C), Max:98 5 °F (36 9 °C)  Current: Temperature: 98 5 °F (36 9 °C)    Physical Exam:   General Appearance:  80year old chronically debilitated male, being cleaned up in bed, in no acute distress   HEENT: Atraumatic normocephalic   Throat: Oropharynx moist     Pulmonary:   Normal respiratory excursion without accessory muscle use   Cardiac:  RRR, PPM site nontender   Abdomen:   Soft, non-tender, protuberant   Extremities: No edema   : Condom cath to [place] with clear, yellow urine in bag, no SPT   Psychiatric: Awake, cooperative   Skin: No new rashes  Right chest PICC site nontender          Labs, Imaging, & Other studies:   All pertinent labs and imaging studies were personally reviewed  Results from last 7 days   Lab Units 01/12/23  0427 01/11/23  0522 01/10/23  2120   WBC Thousand/uL 7 91 6 77 6 53   HEMOGLOBIN g/dL 8 3* 8 1* 8 7*   PLATELETS Thousands/uL 190 180 190     Results from last 7 days   Lab Units 01/12/23  0427 01/11/23  0522 01/10/23  2129   SODIUM mmol/L 134* 135 130*   POTASSIUM mmol/L 4 0 2 9* 2 6*   CHLORIDE mmol/L 96 94* 89*   CO2 mmol/L 29 30 31   BUN mg/dL 18 18 19   CREATININE mg/dL 1 58* 1 42* 1 51*   EGFR ml/min/1 73sq m 40 45 42   CALCIUM mg/dL 8 5 8 2* 8 8   AST U/L  --   --  21   ALT U/L  --   --  6*   ALK PHOS U/L  --   -- 77     Results from last 7 days   Lab Units 01/10/23  2128 01/10/23  2120   BLOOD CULTURE  No Growth at 24 hrs  No Growth at 24 hrs           Results from last 7 days   Lab Units 01/11/23  0522   CRP mg/L 20 3*

## 2023-01-12 NOTE — ASSESSMENT & PLAN NOTE
Lab Results   Component Value Date    EGFR 45 01/11/2023    EGFR 42 01/10/2023    EGFR 50 12/29/2022    CREATININE 1 42 (H) 01/11/2023    CREATININE 1 51 (H) 01/10/2023    CREATININE 1 30 12/29/2022     Baseline creatinine 1 3-1 6  · Creatinine continues to remain at baseline

## 2023-01-12 NOTE — CONSULTS
Telemedicine consult    1/11/2022 initial       Completion 1/12/2022 following further imaging    Ranjeet Youssef " Steven"    Age 80  years    Date of birth 1940    Location [address] [facility]    Discussed with Papito Marcano DO by TT and by telephone several occasions over the day    Dr Natty Choi from [facility]  requesting transfer to [facility] but daughter Judi Wright is hesitant  --   see conversation below    I would support transfer as patient optimally needs stereotactic aspiration of left occipital brain abscess via high lateral sagittal parietal approach to avoid primary visual cortex    We are asked to see to regarding new finding of left occipital brain abscess in the setting of refractory MSSA bacteremia    Also secondary centrally concerning additional abscess/esion high-grade evolving brain abscess in right/medial cerebellum indenting fourth ventricle and extending out along the foramen of Luschka    This is likely secondary to retained "scarred in" and infected pacemaker -placed for sick sinus syndrome, 15 years ago, MRI compatible,  and-deemed unsafe to remove    So evidence of recurrent septic partially treated and newly evolving lung nodules likely affected emboli    No past history of malignancy    Diagnoses:  1  Difficult to eradicate MSSA bacteremia over 5 weeks  2  Was due to stop antibiotics shortly but readmitted to [facility]  3  On Ancef via PICC line -being switched to Nafcillin per ID  4  Will now be on IV nafcillin 2 g every 4 hours  5  Presentation [facility] 12/05/2022  6  Hypertension  7  CKD 3  8  Atrial fibrillation on Xarelto -will need to be stopped if patient becomes agreeable to stereotactic biopsy of cerebral abscess  9  Is 2 23  10  Noncompatible MRI pacemaker placed 15 years  11  Infected pacemaker leads - "too scarred in" to be removed safely per cardiology  12  Negative MARY for vegetations so far  13   Multiple pulmonary nodules some decreasing some increasing on serial CT scans chest measuring consistent with possibly treated and recurrent infected pulmonary emboli  14  COPD  13  JOVI  16  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 on contrast CT scan  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  18  Using walker for 1 year  19  ESR 56  20  ALP 20    Not reported to be diabetic  Status post influenza A 12/24/2022    Per provider report:    Patient was discharged with PICC line and plan for IV cefazolin for 6-week course  Patient presented back o ER on evening of 1/10 due to worsening confusion noted by wife at home, generalized weakness and 2 mechanical falls  No fevers, chills, focal weakness or numbness  Per provider report: exam  Confused GCS 14  EOM full  Pupils 3 mm to 2 mm    Visual fields not examined at this time    Generalized weakness 4+ out of 5 possibly secondary to effort    New foot ulcer    Afebrile vital signs stable  Pacemaker    WBC 6 77  Globin 8 1 and platelets 230 K  BC pending one set  Sodium 135 potassium 2 9 creatinine 1 42    Available history past medical history family history social history reviewed    Imaging reviewed by me and later discussed with Dr Carie Cortez from neuroradiology     Initial noncontrast CT head showed 2 cm mass in posterior left paramedian OCCIPITAL lobe with adjacent vasogenic edema  This is a cerebral abscess with well-formed capsule     CT head with contrast performed tod 01/11/2023 showed enhancing lesion in left parasagittal OCCIPITAL lobe, as well as right inferior paramedian cerebellar hemisphere pushing into fourth ventricle and extending out along the right foramen of Luschka with associated edema        There is mainly solid and partially necrotic component likely also to be evolving abscess    However await CT chest abdomen pelvis with IV contrast to evaluate for any primary malignancy elsewhere    Family updated and discussion  Long conversation over 25 minutes with his daughter Lana Sanchez on the evening of 1/11/2023    I also discussed Mr Eugene's condition and management of of left occipital lobe cerebral brain abscess with Dr Gayla URRUTIA internist at [address] [facility]  Mr Katherine Starkey is a 80-year-old man on Xarelto for AF and is status  post PPM     Neurosurgery was asked to see and comment on management of left occipital cerebral abscess and also possible right medial cerebellar cerebellar abscess     Mr Elkin De Jesus is very sick man with multiple medical problems and failing prior antibiotic IV Ancef management for MSSA bacteremia     Apparently was relatively independent at home with him attending to his own ADLs until December 3, 2022     Several admissions to [facility] since December 5, 2022 -  first admission 12/05/2022 for MSSA bacteremia      He has MSSA bacteremia last identified 12/05/2022 and has had showers of infected emboli going to the lungs and other major organ systems        Some of the infected lung parenchyma nodules have regressed and others have increased in size on serial CT chest imaging     Ongoing process     He has longstanding major medical problems  He is a very sick man      PMH: Hypertension atrial fibrillation and PPM for for sick sinus syndrome CHF and CKD     Placement of PPM 15 years ago       He has atrial fibrillation and on Xarelto     He has a pacemaker placed 15 years ago and non  MRI compatible  Scarred in leads within the heart  Peripheral neuropathy -seeing walker 1 year     MARY reportedly negative     SH: Previously very active throughout his life before [facility]  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, [employer]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        He lives with wife and daughter and uses a walker for the last year       He has peripheral neuropathy but he is not a diabetic      Gradually slowed up on the last 3 to 4 months     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    This cannot be removed with high risk of cardiac rupture and complications      So decision was made not to do this     He has ongoing bacteremia  Likely continuing to be bacteremic  Difficult ituation to manage     Failing Ancef treatment  Dr Yohan Garcia from ID switching to Nafcillin     Has secondary complication with left occipital brain abscess      This is moderately sizable and established and has complete former capsule on contrast imaging      Managing this by an IV antibiotics not likely to succeed     Can be tried  Likely still has active organisms within abscess cavity     We would advise transfer to B and stereotactic aspiration and drainage after he was taken off Xarelto     Could need 2 or 3 separate procedures of aspiration at weekly intervals depending on progress of abscess     There are risks to coming off Xarelto in his case      Stability of of major stroke is in the 7 to 10% range     There is also also uncertain mass in the medial right cerebellar lobe with extension of process into the right foramen of Luschka  Hydrocephalus     No obvious leptomeningeal enhancement at this time     There is a solid and low-density componen to right medial cerebellar mass  Could also be developing abscess     Norkishan Flank indicated she realized her father was quite sick      She was reluctant to consider possibility of anesthesia and brain surgery with stereotactic aspiration of abscess      She was hopeful that change to more "powerful" antibiotic may turn things around abscess may come under control and then gradually shrink    She weould think about it and talk with her mother     Patient has had has had focal area of septic arterial embolization to the left occipital region and cerebritis for 10 days or longer     But now with established capsule     Occipital abscess is noted immediately adjacent to the ventral     If the occipital abscess mass mass got bigger and suggesting  active organisms still multiplying and enlarged enough to reach the walls of the left lateral ventricle and leaked into the atrium of the left lateral ventricle he would have infected ventriculitis which would likely be fatal     Trial of more cidal  IV antibiotics can be tried but without surgical drainage uncertain future of control of left occipital abscess getting larger and extending into the ventricle     The meantime would continue with further work-up to reevaluate him in total again     Recommend  1  Repeat blood cultures x 2 sets  2  MARY again  3  CT chest abdomen pelvis with IV to reevaluate infected lung nodules evaluate liver and spleen for abscesses and review of  the spine for osteomyelitis -treated on 01/12/2023    4  He has extensive lumbar DJD with marked reduction of disc space at L2-3  5  Baseline ESR CRP  6  Continue with high-dose nafcillin IV  7  Repeat brain CT scan with and without contrast next in 5-7 days  8  Follow exam closely for homonymous hemianopsia -initial finding may be right temporal field cut     Fernando Turner indicated she would think about how to proceed    Dr Suraj Clifton indicated she would have further discussions with Fernando Turner     I would favor transfer to [facility] if Fernando Turner agreed for her father to have stereotactic aspiration of his left cerebral abscess    Xarelto would have to be stopped  Coags checked again  Medically optimized    Evaluation new foot ulcer    I extensively discussed Mr Eugene's condition and management of of left occipital lobe cerebral brain abscess with Dr Gayla URRUTIA internist at [facility]  All questions are answered    Provider in agreement with recommendations providing daughter will agree    I spent a total of 50 minutes evaluating past history researching his chart reviewing current examination and history since December 5, 2022 and reviewing serial and current imaging myself and later with Dr True Cranker from Neuroradiology and reviewing prior cardiac evaluation and review ID recommendations and making recommendations on ongoing optimal management, with >50% of the total time devoted to medical consultative verbal/EMR discussion between providers  Written report will be generated in the EMR     1/12/2023 5:38 PM    Jannine Duane D Grey Prude, MD, Attending Neurological Surgeon

## 2023-01-12 NOTE — EMTALA/ACUTE CARE TRANSFER
[address] [facility] [address]  Dept: [phone]      ACUTE CARE TRANSFER CONSENT    NAME Marcella Cooley                                         1940                              MRN [id number]    I have been informed of my rights regarding examination, treatment, and transfer   by Dr Trisha Carlos DO    Benefits: Specialized equipment and/or services available at the receiving facility (Include comment)________________________    Risks: Potential for delay in receiving treatment, Potential deterioration of medical condition, Loss of IV, Increased discomfort during transfer, Possible worsening of condition or death during transfer      Consent for Transfer:  I acknowledge that my medical condition has been evaluated and explained to me by the treating physician or other qualified medical person and/or my attending physician, who has recommended that I be transferred to the service of  Accepting Physician: Dr Valeria Palencia DO at [facility] Name, [address] : [facility]  The above potential benefits of such transfer, the potential risks associated with such transfer, and the probable risks of not being transferred have been explained to me, and I fully understand them  The doctor has explained that, in my case, the benefits of transfer outweigh the risks  I agree to be transferred  I authorize the performance of emergency medical procedures and treatments upon me in both transit and upon arrival at the receiving facility  Additionally, I authorize the release of any and all medical records to the receiving facility and request they be transported with me, if possible  I understand that the safest mode of transportation during a medical emergency is an ambulance and that the Hospital advocates the use of this mode of transport   Risks of traveling to the receiving facility by car, including absence of medical control, life sustaining equipment, such as oxygen, and medical personnel has been explained to me and I fully understand them  (LUCI CORRECT BOX BELOW)  [  ]  I consent to the stated transfer and to be transported by ambulance/helicopter  [  ]  I consent to the stated transfer, but refuse transportation by ambulance and accept full responsibility for my transportation by car  I understand the risks of non-ambulance transfers and I exonerate the Hospital and its staff from any deterioration in my condition that results from this refusal     X___________________________________________    DATE  23  TIME________  Signature of patient or legally responsible individual signing on patient behalf           RELATIONSHIP TO PATIENT_________________________          Provider Certification    NAME Caryn Howard                                         1940                              MRN [id number]    A medical screening exam was performed on the above named patient    Based on the examination:    Condition Necessitating Transfer Brain lesions - probable abscesses requiring neurosurgery evaluation/further management    Patient Condition: The patient has been stabilized such that within reasonable medical probability, no material deterioration of the patient condition or the condition of the unborn child(denise) is likely to result from the transfer    Reason for Transfer: Level of Care needed not available at this facility    Transfer Requirements: [address]   · Space available and qualified personnel available for treatment as acknowledged by    · Agreed to accept transfer and to provide appropriate medical treatment as acknowledged by       Dr Elisa Matthew DO  · Appropriate medical records of the examination and treatment of the patient are provided at the time of transfer   [address] _______  · Transfer will be performed by qualified personnel from    and appropriate transfer equipment as required, including the use of necessary and appropriate life support measures  Provider Certification: I have examined the patient and explained the following risks and benefits of being transferred/refusing transfer to the patient/family:  General risk, such as traffic hazards, adverse weather conditions, rough terrain or turbulence, possible failure of equipment (including vehicle or aircraft), or consequences of actions of persons outside the control of the transport personnel, Unanticipated needs of medical equipment and personnel during transport, Risk of worsening condition      Based on these reasonable risks and benefits to the patient and/or the unborn child(denise), and based upon the information available at the time of the patient’s examination, I certify that the medical benefits reasonably to be expected from the provision of appropriate medical treatments at another medical facility outweigh the increasing risks, if any, to the individual’s medical condition, and in the case of labor to the unborn child, from effecting the transfer      X____________________________________________ DATE 01/12/23        TIME_______      ORIGINAL - SEND TO MEDICAL RECORDS   COPY - SEND WITH PATIENT DURING TRANSFER

## 2023-01-12 NOTE — PROGRESS NOTES
[address]  Progress Note Bharati Yoder 1940, 80 y o  male MRN: [id number]  Unit/Bed#: [address] [place] [address] Encounter: [id number]  Primary Care Provider: Mag Izaguirre DO   Date and time admitted to hospital: 1/10/2023  7:43 PM    * Brain lesion  Assessment & Plan  Patient presented with 5 days of confusion, generalized weakness, decreased oral intake  · CT head: 2 cm mass in the posterior left paramedian parietal lobe with adjacent vasogenic edema which is new  intracranial infection/developing abscess vs mets  · Stat CT head with IV contrast was obtained which shows enhancing lesion within left parasagittal parietal lobe with vasogenic edema and enhancing lesion within right inferior paramedian cerebellar hemisphere static lesion  No midline shift noted -infection with abscess formation versus metastatic disease but most likely infectious in etiology  · Unable to get MRI due to pacemaker  · Coordinated care with radiology, ID, neurosurgery multiple times  Patient is on nafcillin now  · Per neurosurgery, Will obtain CT chest/abdomen/pelvis with IV contrast to rule out malignancy/spinal mets  Check ESR, CRP at baseline   · Neurosurgery recommended transferring patient over to [facility] for possible stereotactic biopsy/aspiration of mass  Discussed with patient's daughter who also then spoke with neurosurgery and they would like to hold off on any diagnostic testing  Patient's daughter prefers continuing treatment with antibiotics with serial imaging to see if the lesions eventually decrease in size or resolve    Spoke with daughter that even if they do not want any diagnostic testing at this time it is still advisable that patient be transferred to [facility] when bed available for evaluation and monitoring by neurosurgery in case his condition worsens or develops neurological symptoms and also as he will need follow-up even after discharge discussed with her that transfer there would also mean that if they decide to pursue diagnostic evaluation, it will be feasible there  She would like to speak with the rest of the family and get back to us by tomorrow  · Repeat blood cultures      MSSA bacteremia  Assessment & Plan  Found to have MSSA bacteremia  · Discontinued IV ancef and changed to nafcillin   · TTE,MARY done on prior admission    Hypokalemia  Assessment & Plan  Patient with severe hypokalemia  · Written for multiple doses of potassium supplementation today  · Mag level low as well, mag repleted  · Recheck in AM    Elevated troponin  Assessment & Plan  Initial troponin 92, trended down to 83  · No chest pain or EKG changes per prior provider  · Tele    COPD (chronic obstructive pulmonary disease) ([facility] [address] )  Assessment & Plan  No exacerbation, stable on room air  · On trelegy, substituted with breo and incruse  · Continue scheduled nebulizer treatments      Stage 3a chronic kidney disease [facility])  Assessment & Plan  Lab Results   Component Value Date    EGFR 45 01/11/2023    EGFR 42 01/10/2023    EGFR 50 12/29/2022    CREATININE 1 42 (H) 01/11/2023    CREATININE 1 51 (H) 01/10/2023    CREATININE 1 30 12/29/2022     Baseline creatinine 1 3-1 6  · Creatinine continues to remain at baseline    Hyponatremia  Assessment & Plan  Sodium 130 --> normalized today  · Secondary to decreased oral intake, dehydration  · Discontinued gentle IVFs overnight  · Recheck in Am    Results from last 7 days   Lab Units 01/11/23  0522 01/10/23  2129   SODIUM mmol/L 135 130*       Falls  Assessment & Plan  Patient with 2 mechanical falls at home day prior to admission  · Fall precautions   · PT/OT    JOVI (obstructive sleep apnea)  Assessment & Plan  JOVI on cpap hs  Chronic a-fib (HCC)  Assessment & Plan  S/p pacemaker placement  · Continue Xarelto      VTE Pharmacologic Prophylaxis: VTE Score: 6 High Risk (Score >/= 5) - Pharmacological DVT Prophylaxis Ordered: rivaroxaban (Xarelto)   Sequential Compression Devices Ordered  Patient Centered Rounds: I performed bedside rounds with nursing staff today  Discussions with Specialists or Other Care Team Provider: yes - radiology, ID, neurosurgery    Education and Discussions with Family / Patient: Updated  (wife) at bedside  Daughter over the phone    Time Spent for Care: 65 min  More than 50% of total time spent on counseling and coordination of care as described above  Current Length of Stay: 1 day(s)  Current Patient Status: Inpatient   Certification Statement: The patient will continue to require additional inpatient hospital stay due to Brain lesions likely abscess requiring IV antibiotic and possible transfer for neurosurgery evaluation  Discharge Plan: Anticipate discharge in >72 hrs to discharge location to be determined pending rehab evaluations  Code Status: Level 1 - Full Code    Subjective:     Initially when patient was seen he was awake but appeared somewhat confused but later became more interactive and answering appropriately  Multiple conversations and plan of care coordinated with ID, neurosurgery, radiology and patient's family    Objective:     Vitals:   Temp (24hrs), Av 8 °F (36 6 °C), Min:97 7 °F (36 5 °C), Max:97 9 °F (36 6 °C)    Temp:  [97 7 °F (36 5 °C)-97 9 °F (36 6 °C)] 97 7 °F (36 5 °C)  HR:  [74-83] 78  Resp:  [16-25] 16  BP: ()/(57-66) 111/57  SpO2:  [92 %-98 %] 98 %  Body mass index is 31 58 kg/m²  Input and Output Summary (last 24 hours): Intake/Output Summary (Last 24 hours) at 2023 1927  Last data filed at 2023 1516  Gross per 24 hour   Intake 2207 5 ml   Output 400 ml   Net 1807 5 ml       Physical Exam:   Physical Exam  Vitals reviewed  Constitutional:       General: He is not in acute distress  Appearance: He is ill-appearing (chronically)  HENT:      Head: Normocephalic and atraumatic  Eyes:      Extraocular Movements: Extraocular movements intact  Cardiovascular:      Rate and Rhythm: Normal rate and regular rhythm  Heart sounds: Murmur heard  Pulmonary:      Effort: Pulmonary effort is normal  No respiratory distress  Breath sounds: No wheezing or rales  Comments: Decreased breath sounds bilaterally  Abdominal:      General: Bowel sounds are normal  There is no distension  Palpations: Abdomen is soft  Tenderness: There is no abdominal tenderness  Musculoskeletal:      Right lower leg: Edema present  Left lower leg: Edema present  Skin:     Comments: Multiple areas of ecchymosis noted   Neurological:      Mental Status: He is alert        Comments: Oriented to self, time         Additional Data:     Labs:  Results from last 7 days   Lab Units 01/11/23 0522   WBC Thousand/uL 6 77   HEMOGLOBIN g/dL 8 1*   HEMATOCRIT % 26 1*   PLATELETS Thousands/uL 180   NEUTROS PCT % 78*   LYMPHS PCT % 12*   MONOS PCT % 8   EOS PCT % 1     Results from last 7 days   Lab Units 01/11/23  0522 01/10/23  2129   SODIUM mmol/L 135 130*   POTASSIUM mmol/L 2 9* 2 6*   CHLORIDE mmol/L 94* 89*   CO2 mmol/L 30 31   BUN mg/dL 18 19   CREATININE mg/dL 1 42* 1 51*   ANION GAP mmol/L 11 10   CALCIUM mg/dL 8 2* 8 8   ALBUMIN g/dL  --  2 4*   TOTAL BILIRUBIN mg/dL  --  0 80   ALK PHOS U/L  --  77   ALT U/L  --  6*   AST U/L  --  21   GLUCOSE RANDOM mg/dL 121 126                 Results from last 7 days   Lab Units 01/10/23  2120   LACTIC ACID mmol/L 1 4       Lines/Drains:  Invasive Devices     Central Venous Catheter Line  Duration           CVC Central Lines 12/12/22 Single Right Subclavian 30 days          Peripheral Intravenous Line  Duration           Peripheral IV 01/10/23 Left Antecubital <1 day          Drain  Duration           External Urinary Catheter Small <1 day                Central Line:  Goal for removal: N/A - Discharging with PICC for IV ABX/medications           Imaging: Reviewed radiology reports from this admission including: CT head    Recent Cultures (last 7 days):   Results from last 7 days   Lab Units 01/10/23  2128 01/10/23  2120   BLOOD CULTURE  Received in Microbiology Lab  Culture in Progress  Received in Microbiology Lab  Culture in Progress  Last 24 Hours Medication List:   Current Facility-Administered Medications   Medication Dose Route Frequency Provider Last Rate   • acetaminophen  650 mg Oral Q6H PRN Ivania Mcknight PA-C     • acetylcysteine  3 mL Nebulization TID Shelby Mueller PA-C     • allopurinol  200 mg Oral Daily Shelby Mueller PA-C     • docusate sodium  100 mg Oral BID Shelby Mueller PA-C     • DULoxetine  60 mg Oral BID Shelby Mueller PA-C     • finasteride  5 mg Oral Daily Shelby Mueller PA-C     • fluticasone-vilanterol  1 puff Inhalation Daily Shelby uMeller PA-C     • levalbuterol  1 25 mg Nebulization TID Ivania Mcknight PA-C     • nafcillin  2,000 mg Intravenous Q4H Martine Aldea, DO 2,000 mg (01/11/23 1527)   • pantoprazole  40 mg Oral Early Morning Shelby Mueller PA-C     • polyethylene glycol  17 g Oral Daily PRN Shelby Mueller PA-C     • potassium chloride  20 mEq Oral Once Lynn Revankar, DO     • saccharomyces boulardii  250 mg Oral BID Shelby Mueller PA-C     • senna  8 6 mg Oral HS Shelby Mueller PA-C     • sodium chloride (PF)  10 mL Intravenous Q8H [facility] Shelby Mueller PA-C     • umeclidinium  1 puff Inhalation Daily Ivania Mcknight PA-C          Today, Patient Was Seen By: Ramona Mclain DO    **Please Note: This note may have been constructed using a voice recognition system  **

## 2023-01-12 NOTE — ASSESSMENT & PLAN NOTE
Patient with severe hypokalemia  · Written for multiple doses of potassium supplementation today  · Mag level low as well, mag repleted  · Recheck in AM

## 2023-01-12 NOTE — CASE MANAGEMENT
Case Management Progress Note    Patient name Reta Torres  Location [address]il[address]-* MRN [id number]  : 1940 Date 2023       LOS (days): 2  Geometric Mean LOS (GMLOS) (days): 8 10  Days to GMLOS:6 3        OBJECTIVE:      Current admission status: Inpatient  Preferred Pharmacy:   [id number] [place] [address] [facility],  [id number]  Phone: [phone] Fax: [phone]    [address], [address] [place] [address]  [address]  [place] [address]  Phone: [phone] Fax: [phone]    [facility] [address] [place] [address]  Phone: [phone] Fax: [phone]    Primary Care Provider: Cheryl Palacios DO    Primary Insurance: MEDICARE  Secondary Insurance: COMMERCIAL MISCELLANEOUS    PROGRESS NOTE:    SW notified by attending that plan is for patient to transfer to [facility] for neurosurgery evaluation  Current STR referral cancelled in [facility]

## 2023-01-12 NOTE — ASSESSMENT & PLAN NOTE
Found to have MSSA bacteremia  · Discontinued IV ancef and changed to nafcillin   · TTE,MARY done on prior admission

## 2023-01-12 NOTE — QUICK NOTE
Quick note    01/12/2022    Estrella Car    Aged 80 years    Date of birth 1940    Location [facility] room [address]  I discussed Mr Eugene's condition and management of of left occipital lobe cerebral brain abscess with Dr Gayla Patel internist at [facility]    I had extensive conversation with his daughter Kim Bermudze last night    Mr Ernie Aquino is a 24-year-old man on Xarelto for AF and is status  post PPM    Neurosurgery was asked to see and comment on management of left occipital cerebral abscess and also possible right medial cerebellar cerebellar abscess    Mr Osvaldo Evans is very sick man with multiple medical problems and failing prior antibiotic management for MSSA bacteremia    Apparently was relatively independent at home with her attending to his own ADLs until December 3, 2022    Several admissions to [facility] since December 5, 2022 -  first admission 12/05/2022 for MSSA bacteremia     He has MSSA bacteremia last identified 12/05/2022 and has had showers of infected emboli going to the lungs and other major organ systems  Some of the infected lung parenchyma nodules have regressed and others have increased in size on serial CT chest imaging    No ongoing process    He has longstanding major medical problems  He is a very sick man  PMH: Hypertension atrial fibrillation and PPM for for sick sinus syndrome CHF and CKD    Placement of PPM 15 years ago      He has atrial fibrillation and on Xarelto    He has a pacemaker placed 15 years ago and non  MRI compatible  Scarred in leads within the heart  MARY reportedly negative    SH: 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, 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  [address]  He lives with wife and daughter and uses a walker for the last year  He has peripheral neuropathy but he is not a diabetic     Gradually slowed up on the last 3 to 4 months    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    This cannot be removed with high risk of cardiac rupture and complications  So decision was made not to do this    He has ongoing bacteremia  Likely continuing to be bacteremic  Difficult ituation to manage    Failing Ancef treatment  Dr Amina Haddad from ID switching to Nafcillin    Has secondary complication with left occipital brain abscess  This is moderately sizable and established and has complete former capsule on contrast imaging  Managing this by an IV antibiotics not likely to succeed    Can be tried  Likely still has active organisms within abscess cavity    We would advise transfer to B and stereotactic aspiration and drainage after he was taken off Xarelto    Could need 2 or 3 separate procedures of aspiration at weekly intervals depending on progress of abscess    There are risks to coming off Xarelto in his case  Stability of of major stroke is in the 7 to 10% range    There is also also uncertain mass in the medial right cerebellar lobe with extension of process into the right foramen of Luschka  Hydrocephalus    No obvious leptomeningeal enhancement at this time    There is a solid and low-density componen to right medial cerebellar mass  Could also be developing abscess    L indicated she realized her father was quite sick  She was reluctant to consider possibility of anesthesia and brain surgery with stereotactic aspiration of abscess  She was hopeful that change to more "powerful" antibiotic may turn things around abscess may come under control and then gradually shrink    Weekly has had focal area of embolization and cerebritis for 10 days or longer     But now with established capsule    Occipital abscess is noted immediately adjacent to the ventral    If the occipital abscess mass mass got bigger and did not with active organisms and leaked into the atrium of the left lateral ventricle ventricle he would have infected ventriculitis which would likely be fatal    Need of IV antibiotics can be tried but without surgical drainage uncertain future of control of abscess getting larger and extending    The meantime would continue with further work-up to reevaluate him in total again    Recommend  1  Repeat blood cultures x2 sets  2  MARY again  3  CT chest abdomen pelvis with IV to reevaluate infected lung nodules evaluate liver and spleen for abscesses and review of  the spine for osteomyelitis  4  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

## 2023-01-13 PROBLEM — R73.9 HYPERGLYCEMIA: Status: ACTIVE | Noted: 2023-01-01

## 2023-01-13 NOTE — ASSESSMENT & PLAN NOTE
· Likely secondary to brain lesion as noted above  · Continue supportive care and IV antibiotic therapy   · Will need repeat CT head next week per neurosurgical recommendations, unable to obtain MRI brain due to non compatible pacemaker

## 2023-01-13 NOTE — ASSESSMENT & PLAN NOTE
Lab Results   Component Value Date    EGFR 40 01/12/2023    EGFR 45 01/11/2023    EGFR 42 01/10/2023    CREATININE 1 58 (H) 01/12/2023    CREATININE 1 42 (H) 01/11/2023    CREATININE 1 51 (H) 01/10/2023     Baseline creatinine 1 3-1 6  Stable  Monitor

## 2023-01-13 NOTE — PROGRESS NOTES
[facility]  Progress Note - Vicente Gens 1940, 80 y o  male MRN: [id number]  Unit/Bed#: [facility] 606-01 Encounter: [id number]  Primary Care Provider: Riaz Lopes DO   Date and time admitted to hospital: 1/12/2023 11:49 PM      DOS: 1/13/2023  * Brain lesion  Assessment & Plan  Patient presented with progressive confusion, generalized weakness, and decreased oral intake to [address] [place] [address]   • CT head showed peripherally enhancing lesion within the left parasagittal parietal lobe with surrounding vasogenic edema likely abscess given smooth peripheral rim enhancement development of ill defined enhancing lesion within right inferior paramedian cerebellar hemisphere with mild associated edema   • Unable to obtain MRI due to pacemaker which is not compatible per prior documentation   • ESR 56, CRP 20  • Transferred to [facility] for neurosurgical evaluation  • Neurosurgery following,  • Continue IV abx per ID recommendations   • No surgical intervention at this time, will need repeat CT head next week to evaluate for improvement on antibiotic regimen and re-evaluate need for surgery at that time   • Continue to hold Xarelto in the interim    • ID consulted, currently on IV Nafcillin 2 g Q4H  • Repeat blood cultures neg x 48 hours, additional cultures pending   • Repeat TTE results pending  • Continue with IV nafcillin    Metabolic encephalopathy  Assessment & Plan  · Likely secondary to brain lesion as noted above  · Continue supportive care and IV antibiotic therapy   · Will need repeat CT head next week per neurosurgical recommendations, unable to obtain MRI brain due to non compatible pacemaker     Hyperglycemia  Assessment & Plan  · Noted on AM BMP  · Check hgbA1c  · Fingersticks QID, if persistent hyperglycemia consideration to add SSI coverage  · Monitor     MSSA bacteremia  Assessment & Plan  On recent admission noted to have MSSA bacteremia  • Plan was for 6 weeks of IV antibiotic cefazolin through 1/17/2023  • Patient underwent TTE and MARY on prior admission  • Negative repeat blood culture  • IV cefazolin was changed to IV nafcillin  • Appreciate ongoing ID recommendations, consultation pending    Chronic diastolic heart failure (HCC)  Assessment & Plan  Wt Readings from Last 3 Encounters:   01/12/23 112 kg (246 lb)   01/03/23 113 kg (249 lb)   12/28/22 118 kg (260 lb 2 3 oz)     · Recent cardiac echo with EF 60% and severe AS  · Continue diuretics with torsemide 40 mg daily   · Monitor volume status        COPD (chronic obstructive pulmonary disease) ([facility] [address] )  Assessment & Plan  No exacerbation, stable on room air  • On trelegy, substituted with breo and incruse  • Continue scheduled nebulizer treatments  Essential hypertension  Assessment & Plan  · Acceptable BP on review   · Continue Torsemide 40 mg daily   · Monitor    Hypokalemia  Assessment & Plan  · K+ 3 2 today   · Replete with 20 mEq IV potassium and 20 mEq PO KDUR  · Obtain repeat BMP in the AM  · Mag level WNL    Stage 3a chronic kidney disease [facility])  Assessment & Plan  Lab Results   Component Value Date    EGFR 41 01/13/2023    EGFR 40 01/12/2023    EGFR 45 01/11/2023    CREATININE 1 55 (H) 01/13/2023    CREATININE 1 58 (H) 01/12/2023    CREATININE 1 42 (H) 01/11/2023     · Baseline creatinine 1 3-1 6  · Cr   Stable at 1 55 today   · Trend BMP intermittently   · Continue home torsemide 40 mg daily     Hyponatremia  Assessment & Plan  • Resolved, likely in setting of volume depletion   • IVF have been discontinued, continue to monitor off fluid  • Continue on home torsemide 40 mg daily as BP allows   • Monitor BMP    JOVI (obstructive sleep apnea)  Assessment & Plan  · Continue with CPAP nightly    Chronic a-fib (HCC)  Assessment & Plan  S/p pacemaker placement, not maintained on any rate controlling medications at this time  • Holding Xarelto for now per neurosurgery     VTE Pharmacologic Prophylaxis: VTE Score: 10 High Risk (Score >/= 5) - Pharmacological DVT Prophylaxis Contraindicated  Sequential Compression Devices Ordered  per neurosurgery recommendations     Patient Centered Rounds: I evaluated the patient without nursing staff present due to speaking to nurse outside patient's room   Discussions with Specialists or Other Care Team Provider: Discussed with neurosurgery, RN and reviewed previous notes     Education and Discussions with Family / Patient: Updated  (multiple family members ) at bedside  Time Spent for Care: 30 minutes  More than 50% of total time spent on counseling and coordination of care as described above  Current Length of Stay: 1 day(s)  Current Patient Status: Inpatient   Certification Statement: The patient will continue to require additional inpatient hospital stay due to ongoing neurosurgical evaluations, repeat CT head, IV abx, ID consult  Discharge Plan: Anticipate discharge in >72 hrs to discharge location to be determined pending rehab evaluations  Code Status: Level 1 - Full Code    Subjective:   Pt denies any pain currently  Pt's family reports that he is better mentation wise today, making some more sense, able to identify family members  Also reports that his lower extremity edema is significantly improved  Objective:     Vitals:   Temp (24hrs), Av 9 °F (37 7 °C), Min:99 9 °F (37 7 °C), Max:99 9 °F (37 7 °C)    Temp:  [99 9 °F (37 7 °C)] 99 9 °F (37 7 °C)  HR:  [81-84] 81  Resp:  [17-24] 24  BP: (146-150)/(71-80) 146/71  SpO2:  [94 %-99 %] 98 %  There is no height or weight on file to calculate BMI  Input and Output Summary (last 24 hours):   No intake or output data in the 24 hours ending 23 1411    Physical Exam:   Physical Exam  Vitals reviewed  Constitutional:       General: He is not in acute distress  Appearance: He is ill-appearing  He is not toxic-appearing        Comments: Pt is in no acute distress lying in his hospital bed resting comfortably  Disoriented  HENT:      Head: Normocephalic  Cardiovascular:      Rate and Rhythm: Normal rate and regular rhythm  Pulmonary:      Effort: Pulmonary effort is normal  No respiratory distress  Breath sounds: No wheezing  Abdominal:      General: There is no distension  Palpations: Abdomen is soft  Tenderness: There is no abdominal tenderness  Musculoskeletal:      Right lower leg: No edema  Left lower leg: No edema  Skin:     General: Skin is warm and dry  Neurological:      Mental Status: He is alert  Additional Data:     Labs:  Results from last 7 days   Lab Units 01/13/23  0605   WBC Thousand/uL 6 87   HEMOGLOBIN g/dL 8 3*   HEMATOCRIT % 27 3*   PLATELETS Thousands/uL 202   NEUTROS PCT % 79*   LYMPHS PCT % 12*   MONOS PCT % 7   EOS PCT % 1     Results from last 7 days   Lab Units 01/13/23  0605   SODIUM mmol/L 136   POTASSIUM mmol/L 3 2*   CHLORIDE mmol/L 98   CO2 mmol/L 32   BUN mg/dL 18   CREATININE mg/dL 1 55*   ANION GAP mmol/L 6   CALCIUM mg/dL 8 5   ALBUMIN g/dL 2 2*   TOTAL BILIRUBIN mg/dL 1 81*   ALK PHOS U/L 68   ALT U/L <6*   AST U/L 16   GLUCOSE RANDOM mg/dL 141*                 Results from last 7 days   Lab Units 01/10/23  2120   LACTIC ACID mmol/L 1 4       Lines/Drains:  Invasive Devices     Central Venous Catheter Line  Duration           CVC Central Lines 12/12/22 Single Right Subclavian 31 days          Drain  Duration           External Urinary Catheter Small 2 days                Central Line:  Goal for removal: Will discontinue when meds requiring line are completed  Imaging: Reviewed radiology reports from this admission including: chest CT scan and abdominal/pelvic CT, CT head     Recent Cultures (last 7 days):   Results from last 7 days   Lab Units 01/13/23  0105 01/10/23  2128 01/10/23  2120   BLOOD CULTURE  Received in Microbiology Lab  Culture in Progress  Received in Microbiology Lab  Culture in Progress   No Growth at 48 hrs  No Growth at 48 hrs  Last 24 Hours Medication List:   Current Facility-Administered Medications   Medication Dose Route Frequency Provider Last Rate   • acetaminophen  650 mg Oral Q6H PRN Rosaleen Peto, DO     • acetylcysteine  3 mL Nebulization TID Rosaleen Peto, DO     • allopurinol  200 mg Oral Daily Rosaleen Peto, DO     • docusate sodium  100 mg Oral BID Rosaleen Peto, DO     • DULoxetine  60 mg Oral BID Rosaleen Peto, DO     • finasteride  5 mg Oral Daily Rosaleen Peto, DO     • fluticasone-vilanterol  1 puff Inhalation Daily Rosaleen Peto, DO     • levalbuterol  1 25 mg Nebulization TID Rosaleen Peto, DO     • nafcillin  2,000 mg Intravenous Q4H Rosaleen Peto, DO 2,000 mg (01/13/23 0416)   • pantoprazole  40 mg Oral Early Morning Rosaleen Peto, DO     • polyethylene glycol  17 g Oral Daily Rosaleen Peto, DO     • potassium chloride  20 mEq Oral Once Young Rod PA-C     • potassium chloride  20 mEq Intravenous Once Young Rod PA-C     • saccharomyces boulardii  250 mg Oral BID Rosaleen Peto, DO     • senna  8 6 mg Oral HS Rosaleen Peto, DO     • sodium chloride (PF)  10 mL Intravenous [facility] Rosaleen Peto, DO     • torsemide  40 mg Oral Daily Rosaleen Peto, DO     • umeclidinium  1 puff Inhalation Daily Rosaleen Peto, DO          Today, Patient Was Seen By: Raul Schmid PA-C    **Please Note: This note may have been constructed using a voice recognition system  **

## 2023-01-13 NOTE — ASSESSMENT & PLAN NOTE
S/p pacemaker placement, not maintained on any rate controlling medications at this time  • Holding Xarelto for now per neurosurgery

## 2023-01-13 NOTE — ASSESSMENT & PLAN NOTE
Patient presented with 5-7 days of confusion, generalized weakness, decreased oral intake  • CT scan showed enhancing lesion within left parasagittal parietal lobe with vasogenic edema and enhancing lesion within right inferior paramedian cerebellar hemisphere -infection with abscess formation vs mets, but most likely infectious in etiology  • Unable to get MRI due to pacemaker which is not compatible per prior documentation  • Per neurosurgery, CT chest/abdomen/pelvis with IV contrast was done which showed multiple bilateral pulmonary nodules which are suggestive of infectious/inflammatory etiology  Mild interval improvement of moderate right pleural effusion, atelectasis, persistent mediastinal adenopathy, moderate rectal stool burden, infrarenal abdominal aneurysm   • ESR 56, CRP 20  • Neurosurgery recommended transferring patient over to [place] for possible stereotactic biopsy/aspiration of mass  daughter spoke with neurosurgery as well and family would like to hold off on any diagnostic testing at this time  Patient's daughter prefers continuing treatment with antibiotics with serial imaging to see if the lesions eventually decrease in size or resolve    They will decide on diagnostic testing based on how patient responds to antibiotics alone  • Repeat blood cultures neg so far  • Rpt TTE results pending  • Continue with IV nafcillin

## 2023-01-13 NOTE — ASSESSMENT & PLAN NOTE
Patient presented with progressive confusion, generalized weakness, and decreased oral intake to [address]   • CT head showed peripherally enhancing lesion within the left parasagittal parietal lobe with surrounding vasogenic edema likely abscess given smooth peripheral rim enhancement development of ill defined enhancing lesion within right inferior paramedian cerebellar hemisphere with mild associated edema   • Unable to obtain MRI due to pacemaker which is not compatible per prior documentation   • ESR 56, CRP 20  • Transferred to [facility] for neurosurgical evaluation  • Neurosurgery following,  • Continue IV abx per ID recommendations   • No surgical intervention at this time, will need repeat CT head next week to evaluate for improvement on antibiotic regimen and re-evaluate need for surgery at that time   • Continue to hold Xarelto in the interim    • ID consulted, currently on IV Nafcillin 2 g Q4H  • Repeat blood cultures neg x 48 hours, additional cultures pending   • Repeat TTE results pending  • Continue with IV nafcillin

## 2023-01-13 NOTE — ASSESSMENT & PLAN NOTE
Wt Readings from Last 3 Encounters:   01/12/23 112 kg (246 lb)   01/03/23 113 kg (249 lb)   12/28/22 118 kg (260 lb 2 3 oz)     Demadex initially held but today restarted at a lower dose of 40 mg daily due to decreased p o  intake and severe hypokalemia initially  Later in the evening patient noted to be slightly tachypneic with rales on exam   20 mg of IV Lasix x1 given as well  I/O

## 2023-01-13 NOTE — ASSESSMENT & PLAN NOTE
S/p pacemaker placement  · Holding Xarelto for any possible neurosurgical intervention if family decides to pursue this option  · Family is aware of Xarelto being held and patient's risk of having a stroke while off Xarelto

## 2023-01-13 NOTE — CONSULTS
[address] 1940, 80 y o  male MRN: [id number]  Unit/Bed#: [facility] 606-01 Encounter: [id number]  Primary Care Provider: Calista Escobar DO   Date and time admitted to hospital: 1/12/2023 11:49 PM    Inpatient consult to Neurosurgery  Consult performed by: Yoel Rankin PA-C  Consult ordered by: Nithya Quinteros DO        * Brain lesion  Assessment & Plan  • Patient presented with progressive confusion generalized weakness and decreased oral intake  • MSSA bacteremia on 6 weeks IV antibiotics through 1/17/2023    Imaging:   • CT head with and without 1/11/2022: Peripherally enhancing lesion within the left parasagittal parietal lobe with surrounding vasogenic edema likely abscess given smooth peripheral rim enhancement development of an ill-defined enhancing lesion within the right inferior paramedian cerebellar hemisphere with mild associated edema  Plan:   · ongoing frequent neurological checks  · Recommend STAT CT head for decline in GCS >2 points in 1 hour  · MRI brain unable to be obtained as patient has non-compatible Mri pacemaker which is not amenable to removal  · DVT ppx: SCD's only  · Patient previously on [address] for [facility] therapy  Currently on hold  · Would continue to hold until follow up imaging obtained to ensure improvement with medical therapy and no delayed surgical intervention is necessary  · PT/OT evaluation  · Ongoing medical management and pain control per primary team    · ID following   · ABX changed to nafcillin 2g Q4 hours   · Repeat blood cultures negative at this time   · CM following for dispo planning  · No surgical intervention at this time  Imaging reviewed this morning and would monitor with medical therapy  · Will plan to monitor intracranial collections with repeat CT head with and without contrast next week  Will place order at this time   · Call with questions or concerns       History of Present Illness   HPI: Arabella Marquez is a 80 y o  male with PMH including arthritis, low back pain, CKD, CAD, atrial flutter on Xarelto, heart failure, emphysema, presence of a pacemaker who presents with complaint of confusion generalized weakness and decreased oral intake for about 5 to 7 days prior to admission  Patient has had multiple hospital admissions in the last several months  On 12/5/2022 patient was found to have MSSA bacteremia and initiated on 6 weeks of IV medical therapy  Unfortunately he represented to the hospital with the above complaints  CT imaging on admission demonstrated brain lesions with surrounding vasogenic edema  He is unable to have an MRI of the brain secondary to the presence of a noncompatible pacemaker  It is recommended patient transferred to [facility] for further evaluation and management per infectious disease and neurosurgery  Patient's family wished to avoid aggressive surgical intervention in favor of medical management  Currently at this time patient is alert and responds to voice  He has significant slurred speech that relates to difficulty with communication and incomprehensible sounds  Global weakness although strength appears more intact on left compared to right  Review of Systems   Unable to perform ROS: Other (confusion  slurred speech with incomprehensible words   )     Historical Information   Past Medical History:   Diagnosis Date   • Arthritis    • Atrial flutter ([address] )    • Chronic kidney disease     stage 3   • Coronary artery disease     2 stents   • Fluid retention    • Gout    • Heart disease    • Heart failure (HCC)     pacemaker   • Hypertension    • Hyponatremia 04/08/2019   • Neurological disorder    • Pacemaker    • Pulmonary emphysema ([address] )    • Radiculopathy     last assessed 1/28/16    • Shortness of breath     exertion   • Sleep apnea     c pap     Past Surgical History:   Procedure Laterality Date   • ANGIOPLASTY      x2 2 stents and then replaced   • CARDIAC PACEMAKER PLACEMENT      pacemaker permanent placement dual chamber / last assessed 14 / implantation    • CARDIAC SURGERY      pacemaker   • CHOLECYSTECTOMY     • CORONARY ANGIOPLASTY WITH STENT PLACEMENT     • EPIDURAL BLOCK INJECTION N/A 2016    Procedure: BLOCK / INJECTION EPIDURAL STEROID LUMBAR  L4-5;  Surgeon: Fatoumata De La Garza MD;  Location: [place] [address] MAIN OR;  Service:    • EPIDURAL BLOCK INJECTION N/A 2019    Procedure: L4 L5 Lumbar Epidural Steroid Injection;  Surgeon: Fatoumata De La Garza MD;  Location: [place] [address] MAIN OR;  Service: Pain Management    • EYE SURGERY      cataract left   • HAND SURGERY     • HIP PINNING Left    • INSERT / REPLACE / REMOVE PACEMAKER     • IR TUNNELED CENTRAL LINE PLACEMENT  2022   • KNEE ARTHROSCOPY W/ MENISCAL REPAIR Left    • KNEE SURGERY     • LUMBAR EPIDURAL INJECTION N/A 2016    Procedure: BLOCK / INJECTION LUMBAR  L4-5  (C-ARM); Surgeon: Fatoumata De La Garza MD;  Location: [facility] MAIN OR;  Service:    • ID OPTX FEM SHFT FX W/INSJ IMED IMPLT W/WO SCREW Right 2022    Procedure: INSERTION NAIL IM FEMUR ANTEGRADE (TROCHANTERIC);   Surgeon: Yolanda Mcfarlane MD;  [facility] Main [facility];  Service: Orthopedics     Social History     Substance and Sexual Activity   Alcohol Use Never     Social History     Substance and Sexual Activity   Drug Use No     Social History     Tobacco Use   Smoking Status Every Day   • Packs/day: 0 25   • Years: 50 00   • Pack years: 12 50   • Types: Cigarettes   • Start date: 2022   Smokeless Tobacco Former   Tobacco Comments    quit 2021     Family History   Problem Relation Age of Onset   • Cancer Mother 80        Cancer when 80 yrs old   • Heart disease Mother    • Hypertension Mother    • Heart disease Father    • Cancer Father         Heart   46   • Diabetes Neg Hx    • Stroke Neg Hx        Meds/Allergies   all current active meds have been reviewed, current meds:   Current Facility-Administered Medications   Medication Dose Route Frequency   • acetaminophen (TYLENOL) tablet 650 mg  650 mg Oral Q6H PRN   • acetylcysteine (MUCOMYST) 200 mg/mL inhalation solution 600 mg  3 mL Nebulization TID   • allopurinol (ZYLOPRIM) tablet 200 mg  200 mg Oral Daily   • docusate sodium (COLACE) capsule 100 mg  100 mg Oral BID   • DULoxetine (CYMBALTA) delayed release capsule 60 mg  60 mg Oral BID   • finasteride (PROSCAR) tablet 5 mg  5 mg Oral Daily   • fluticasone-vilanterol 200-25 mcg/actuation 1 puff  1 puff Inhalation Daily   • levalbuterol (XOPENEX) inhalation solution 1 25 mg  1 25 mg Nebulization TID   • nafcillin (NALLPEN) 2,000 mg in sodium chloride 0 9 % 100 mL IVPB  2,000 mg Intravenous Q4H   • pantoprazole (PROTONIX) EC tablet 40 mg  40 mg Oral Early Morning   • polyethylene glycol (MIRALAX) packet 17 g  17 g Oral Daily   • saccharomyces boulardii (FLORASTOR) capsule 250 mg  250 mg Oral BID   • senna (SENOKOT) tablet 8 6 mg  8 6 mg Oral HS   • sodium chloride (PF) 0 9 % injection 10 mL  10 mL Intravenous Q8H [address]   • torsemide (DEMADEX) tablet 40 mg  40 mg Oral Daily   • umeclidinium 62 5 mcg/actuation inhaler AEPB 1 puff  1 puff Inhalation Daily    and PTA meds:   Prior to Admission Medications   Prescriptions Last Dose Informant Patient Reported? Taking?    Albuterol Sulfate (albuterol, FOR EMS ONLY,) (2 5 mg/3 mL) 0 083 % nebulizer solution   Yes No   Sig: Take 2 5 mg by nebulization every 6 (six) hours as needed for wheezing   DULoxetine (CYMBALTA) 60 mg delayed release capsule   No No   Sig: TAKE 1 CAPSULE TWICE DAILY   Heparin Sod, Pork, Lock Flush (heparin flush in sodium acetate 0 225%) 0 5 units/mL   Yes No   Sig: Inject into a catheter in a vein   Torsemide 40 MG TABS   No No   Sig: Take 40 mg by mouth 2 (two) times a day   acetylcysteine (MUCOMYST) 200 mg/mL nebulizer solution   No No   Sig: Take 3 mL (600 mg total) by nebulization 3 (three) times a day   allopurinol (ZYLOPRIM) 100 mg tablet   No No   Sig: Take 2 tablets (200 mg total) by mouth daily   ceFAZolin (ANCEF) 2000 mg IVPB   No No   Sig: Inject 2,000 mg into a catheter in a vein every 8 (eight) hours for 28 days   docusate sodium (COLACE) 100 mg capsule   No No   Sig: Take 1 capsule (100 mg total) by mouth 2 (two) times a day   finasteride (PROSCAR) 5 mg tablet   No No   Sig: TAKE 1 TABLET DAILY   fluticasone-umeclidinium-vilanterol (Trelegy Ellipta) 200-62 5-25 mcg/actuation AEPB inhaler   No No   Sig: Inhale 1 puff daily Rinse mouth after use  levalbuterol (XOPENEX) 1 25 mg/0 5 mL nebulizer solution   No No   Sig: Take 0 5 mL (1 25 mg total) by nebulization 3 (three) times a day   lidocaine (LIDODERM) 5 %   No No   Sig: Apply 1 patch topically daily Remove & Discard patch within 12 hours   pantoprazole (PROTONIX) 40 mg tablet   No No   Sig: Take 1 tablet (40 mg total) by mouth daily in the early morning Do not start before November 23, 2022  polyethylene glycol (MIRALAX) 17 g packet   Yes No   Sig: Take 17 g by mouth daily as needed   rivaroxaban (XARELTO) 15 mg tablet   No No   Sig: Take 1 tablet (15 mg total) by mouth daily with breakfast   saccharomyces boulardii (FLORASTOR) 250 mg capsule   No No   Sig: Take 1 capsule (250 mg total) by mouth 2 (two) times a day for 28 days   senna (SENOKOT) 8 6 mg   No No   Sig: Take 1 tablet (8 6 mg total) by mouth daily at bedtime   sodium chloride, PF, 0 9 %   Yes No   Sig: Inject 10 mL into a catheter in a vein Three times a day      Facility-Administered Medications: None     No Known Allergies    Objective   I/O       01/11 0701 01/12 0700 01/12 0701 01/13 0700 01/13 0701 01/14 0700           Unmeasured Urine Occurrence  1 x           Physical Exam  Constitutional:       Appearance: He is ill-appearing  HENT:      Head: Atraumatic  Eyes:      Pupils: Pupils are equal, round, and reactive to light        Comments: Some tracking evident at this time    Pulmonary:      Effort: Pulmonary effort is normal    Musculoskeletal:      Cervical back: Neck supple  Skin:     Comments: Multiple bruises and abrasions to the BUE   Neurological:      Mental Status: He is alert  Comments: Cranial nerves grossly intact  Patient is alert in room  Unsure regarding orientation as patient had incomprehensible sounds secondary to significant speech dysfunction  Global weakness although antigravity in left upper extremity 4-/5  More pronounced weakness in RUE 3/5 and BLE 2/5  Limited effort on examination when assessing the LE  Fluctuating mental status  Unable to assess sensation secondary to mental status         Vitals:Blood pressure 146/71, pulse 81, temperature 99 9 °F (37 7 °C), resp  rate (!) 24, SpO2 99 %  ,There is no height or weight on file to calculate BMI       Lab Results:   Results from last 7 days   Lab Units 01/13/23  0605 01/12/23  0427 01/11/23  0522 01/10/23  2120   WBC Thousand/uL 6 87 7 91 6 77 6 53   HEMOGLOBIN g/dL 8 3* 8 3* 8 1* 8 7*   HEMATOCRIT % 27 3* 27 2* 26 1* 28 6*   PLATELETS Thousands/uL 202 190 180 190   NEUTROS PCT % 79*  --  78* 78*   MONOS PCT % 7  --  8 8     Results from last 7 days   Lab Units 01/13/23  0605 01/12/23  0427 01/11/23  0522 01/10/23  2129   POTASSIUM mmol/L 3 2* 4 0 2 9* 2 6*   CHLORIDE mmol/L 98 96 94* 89*   CO2 mmol/L 32 29 30 31   BUN mg/dL 18 18 18 19   CREATININE mg/dL 1 55* 1 58* 1 42* 1 51*   CALCIUM mg/dL 8 5 8 5 8 2* 8 8   ALK PHOS U/L 68  --   --  77   ALT U/L <6*  --   --  6*   AST U/L 16  --   --  21     Results from last 7 days   Lab Units 01/13/23 0605 01/12/23 0427 01/11/23 0522   MAGNESIUM mg/dL 2 1 2 0 1 4*             No results found for: TROPONINT  ABG:  Lab Results   Component Value Date    PHART 7 437 12/06/2022    HPJ4SWE 44 0 12/06/2022    PO2ART 29 0 (LL) 12/06/2022    LZT8RZT 29 0 (H) 12/06/2022    BEART 4 3 12/06/2022    SOURCE Line, Venous 12/06/2022       Imaging Studies: I have personally reviewed pertinent reports  and I have personally reviewed pertinent films in PACS    CT chest abdomen pelvis w contrast    Result Date: 1/12/2023  Impression: CHEST: 1   Redemonstration of multiple bilateral pulmonary nodules as listed above  Some of the nodules have enlarged while others have decreased in size from most recent CT of 12/29/2022  The somewhat rapid changes in size of these nodules over recent exams suggests an infectious/inflammatory etiology rather than malignancy  Continued follow-up is recommended to monitor for resolution  2   Mild interval improvement of moderate right pleural effusion with improved aeration of the right middle and right lower lobes  Patchy groundglass changes within the right lower lobe may represent residual atelectasis versus pneumonia  Trace left pleural effusion has slightly worsened in the interval  3   Interval improvement of pericardial effusion  4   Persistent mediastinal adenopathy, likely reactive  Attention on follow-up is recommended  ABDOMEN/PELVIS: 1  No CT evidence of acute process within the abdomen or pelvis  2   Moderate rectal stool burden without evidence of rectal wall thickening or perirectal inflammatory changes  3   Colonic diverticulosis without evidence of acute inflammatory changes  4   Fusiform infrarenal abdominal aneurysm measuring 3 5 x 3 5 cm (AP x transverse)  The study was marked in EPIC for significant notification  Workstation performed: [id number]       EKG, Pathology, and Other Studies: I have personally reviewed pertinent reports  VTE Prophylaxis: Sequential compression device Corbin Fischer)     Code Status: Level 1 - Full Code  Advance Directive and Living Will: Yes    Power of :    POLST:      Counseling / Coordination of Care  I spent 20 minutes with the patient

## 2023-01-13 NOTE — ASSESSMENT & PLAN NOTE
No exacerbation, stable on room air  • On trelegy, substituted with breo and incruse  • Continue scheduled nebulizer treatments

## 2023-01-13 NOTE — ASSESSMENT & PLAN NOTE
On recent admission noted to have MSSA bacteremia  • Plan was for 6 weeks of IV antibiotic cefazolin through 1/17/2023  • Patient underwent TTE and MARY on prior admission  • Negative repeat blood culture  • IV cefazolin was changed to IV nafcillin  • Appreciate ongoing ID recommendations, consultation pending

## 2023-01-13 NOTE — ASSESSMENT & PLAN NOTE
Patient presented with 5-7 days of confusion, generalized weakness, decreased oral intake  · CT head: 2 cm mass in the posterior left paramedian parietal lobe with adjacent vasogenic edema which is new  intracranial infection/developing abscess vs mets  · Stat CT head with IV contrast showed enhancing lesion within left parasagittal parietal lobe with vasogenic edema and enhancing lesion within right inferior paramedian cerebellar hemisphere -infection with abscess formation vs mets, but most likely infectious in etiology  · Unable to get MRI due to pacemaker which is not compatible per prior documentation  · Continue nafcillin   · Per neurosurgery, CT chest/abdomen/pelvis with IV contrast was done which showed multiple bilateral pulmonary nodules which are suggestive of infectious/inflammatory etiology  Mild interval improvement of moderate right pleural effusion, atelectasis, persistent mediastinal adenopathy, moderate rectal stool burden, infrarenal abdominal aneurysm   · ESR 56, CRP 20  · Neurosurgery recommended transferring patient over to [place] for possible stereotactic biopsy/aspiration of mass  daughter spoke with neurosurgery as well and family would like to hold off on any diagnostic testing at this time  Patient's daughter prefers continuing treatment with antibiotics with serial imaging to see if the lesions eventually decrease in size or resolve    They will decide on diagnostic testing based on how patient responds to antibiotics alone  · Repeat blood cultures neg so far  · Rpt TTE results pending

## 2023-01-13 NOTE — ASSESSMENT & PLAN NOTE
Patient with severe hypokalemia initially  · Potassium level normalized status post repletion  · Recheck in AM

## 2023-01-13 NOTE — ASSESSMENT & PLAN NOTE
• Patient presented with progressive confusion generalized weakness and decreased oral intake  • MSSA bacteremia on 6 weeks IV antibiotics through 1/17/2023    Imaging:   • CT head with and without 1/11/2022: Peripherally enhancing lesion within the left parasagittal parietal lobe with surrounding vasogenic edema likely abscess given smooth peripheral rim enhancement development of an ill-defined enhancing lesion within the right inferior paramedian cerebellar hemisphere with mild associated edema  Plan:   · ongoing frequent neurological checks  · Recommend STAT CT head for decline in GCS >2 points in 1 hour  · MRI brain unable to be obtained as patient has non-compatible Mri pacemaker which is not amenable to removal  · DVT ppx: SCD's only  · Patient previously on [address] for [facility] therapy  Currently on hold  · Would continue to hold until follow up imaging obtained to ensure improvement with medical therapy and no delayed surgical intervention is necessary  · PT/OT evaluation  · Ongoing medical management and pain control per primary team    · ID following   · ABX changed to nafcillin 2g Q4 hours   · Repeat blood cultures negative at this time   · CM following for dispo planning  · No surgical intervention at this time  Imaging reviewed this morning and would monitor with medical therapy  · Will plan to monitor intracranial collections with repeat CT head with and without contrast next week  Will place order at this time   · Call with questions or concerns

## 2023-01-13 NOTE — H&P
[facility]  H&P- Rosalind Speak 1940, 80 y o  male MRN: [id number]  Unit/Bed#: [facility] 606-01 Encounter: [id number]  Primary Care Provider: Suman Mosqueda DO   Date and time admitted to hospital: 1/12/2023 11:49 PM    * Brain lesion  Assessment & Plan  Patient presented with 5-7 days of confusion, generalized weakness, decreased oral intake  • CT scan showed enhancing lesion within left parasagittal parietal lobe with vasogenic edema and enhancing lesion within right inferior paramedian cerebellar hemisphere -infection with abscess formation vs mets, but most likely infectious in etiology  • Unable to get MRI due to pacemaker which is not compatible per prior documentation  • Per neurosurgery, CT chest/abdomen/pelvis with IV contrast was done which showed multiple bilateral pulmonary nodules which are suggestive of infectious/inflammatory etiology  Mild interval improvement of moderate right pleural effusion, atelectasis, persistent mediastinal adenopathy, moderate rectal stool burden, infrarenal abdominal aneurysm   • ESR 56, CRP 20  • Neurosurgery recommended transferring patient over to [facility] for possible stereotactic biopsy/aspiration of mass  daughter spoke with neurosurgery as well and family would like to hold off on any diagnostic testing at this time  Patient's daughter prefers continuing treatment with antibiotics with serial imaging to see if the lesions eventually decrease in size or resolve    They will decide on diagnostic testing based on how patient responds to antibiotics alone  • Repeat blood cultures neg so far  • Rpt TTE results pending  • Continue with IV nafcillin    MSSA bacteremia  Assessment & Plan  On recent admission noted to have MSSA bacteremia  • Plan was for 6 weeks of IV antibiotic cefazolin through 1/17/2023  • Patient underwent TTE and MARY on prior admission  • Negative repeat blood culture  • IV cefazolin was changed to IV nafcillin    Metabolic encephalopathy  Assessment & Plan  Likely secondary to above  Continue supportive care and above treatment    Chronic diastolic heart failure (HCC)  Assessment & Plan  Wt Readings from Last 3 Encounters:   01/12/23 112 kg (246 lb)   01/03/23 113 kg (249 lb)   12/28/22 118 kg (260 lb 2 3 oz)     Recent cardiac echo with EF 60% and severe AS  Continue diuretics  Monitor volume status        COPD (chronic obstructive pulmonary disease) ([address] )  Assessment & Plan  No exacerbation, stable on room air  • On trelegy, substituted with breo and incruse  • Continue scheduled nebulizer treatments  Essential hypertension  Assessment & Plan  Acceptable BP  Monitor    Stage 3a chronic kidney disease [facility])  Assessment & Plan  Lab Results   Component Value Date    EGFR 40 01/12/2023    EGFR 45 01/11/2023    EGFR 42 01/10/2023    CREATININE 1 58 (H) 01/12/2023    CREATININE 1 42 (H) 01/11/2023    CREATININE 1 51 (H) 01/10/2023     Baseline creatinine 1 3-1 6  Stable  Monitor    Hyponatremia  Assessment & Plan  • Secondary to decreased oral intake, dehydration  • Improving  • Monitor    JOVI (obstructive sleep apnea)  Assessment & Plan  Continue with CPAP nightly    Chronic a-fib (HCC)  Assessment & Plan  S/p pacemaker placement  • Holding Xarelto for any possible neurosurgical intervention if family decides to pursue this option  • Family is aware of Xarelto being held and patient's risk of having a stroke while off Xarelto    VTE Pharmacologic Prophylaxis: VTE Score: 10 High Risk (Score >/= 5) - Pharmacological DVT Prophylaxis Contraindicated  Sequential Compression Devices Ordered  Code Status: Level 1 - Full Code   Discussion with family: Updated  (daughter) at bedside  Anticipated Length of Stay: Patient will be admitted on an inpatient basis with an anticipated length of stay of greater than 2 midnights secondary to Management of brain abscess      Total Time for Visit, including Counseling / Coordination of Care: 90 minutes Greater than 50% of this total time spent on direct patient counseling and coordination of care  Chief Complaint:   Possible brain abscess    History of Present Illness:    Geovanny Baugh is a 80 y o  male with PPM, COPD, CHF, CKD, presented to ER in the evening of 110 due to worsening confusion noted by wife at home and multiple falls stated with decreased oral intake  Patient had recent prolonged hospitalization x2 in [facility] due to MSSA bacteremia with concern for possible pulmonary septic emboli on imaging  MARY was negative but could not definitively rule out small lesion due to calcifications  After discussion regarding pacemaker removal, the decision was made by family to pursue conservative approach with IV antibiotic  Patient was discharged with PICC line and plan for IV cefazolin for 6-week course  Per family patient was initially doing better since discharge but over the last 5 to 7 days prior to admission he started to decline again  Initial noncontrast CT head showed 2 cm mass in posterior left paramedian parietal lobe with adjacent vasogenic edema  CT head with contrast performed  showed enhancing lesion in left parasagittal parietal lobe, as well as right inferior paramedian cerebellar hemisphere with associated edema  Evaluated by ID and neurosurgery, concern for brain abscess, antibiotic were changed to IV nafcillin and patient was transferred to [facility] for neurosurgery evaluation and possible biopsy    Discussed with patient's daughter at bedside, patient is more confused and disoriented today    Review of Systems:  Review of Systems   Unable to perform ROS: Mental status change   Constitutional: Negative for chills and fever  Respiratory: Negative for chest tightness and shortness of breath  Cardiovascular: Negative for chest pain     Gastrointestinal: Negative for abdominal pain, diarrhea, nausea and vomiting  Genitourinary: Negative for dysuria  Psychiatric/Behavioral: Positive for confusion  Past Medical and Surgical History:   Past Medical History:   Diagnosis Date   • Arthritis    • Atrial flutter ([facility] [address] )    • Chronic kidney disease     stage 3   • Coronary artery disease     2 stents   • Fluid retention    • Gout    • Heart disease    • Heart failure (HCC)     pacemaker   • Hypertension    • Hyponatremia 04/08/2019   • Neurological disorder    • Pacemaker    • Pulmonary emphysema ([facility] )    • Radiculopathy     last assessed 1/28/16    • Shortness of breath     exertion   • Sleep apnea     c pap       Past Surgical History:   Procedure Laterality Date   • ANGIOPLASTY      x2 2 stents and then replaced   • CARDIAC PACEMAKER PLACEMENT      pacemaker permanent placement dual chamber / last assessed 4/7/14 / implantation    • CARDIAC SURGERY      pacemaker   • CHOLECYSTECTOMY     • CORONARY ANGIOPLASTY WITH STENT PLACEMENT     • EPIDURAL BLOCK INJECTION N/A 05/26/2016    Procedure: BLOCK / INJECTION EPIDURAL STEROID LUMBAR  L4-5;  Surgeon: Kam Jones MD;  Location: [facility] MAIN OR;  Service:    • EPIDURAL BLOCK INJECTION N/A 02/14/2019    Procedure: L4 L5 Lumbar Epidural Steroid Injection;  Surgeon: Kam Jones MD;  Location: [facility] MAIN OR;  Service: Pain Management    • EYE SURGERY      cataract left   • HAND SURGERY     • HIP PINNING Left    • INSERT / REPLACE / REMOVE PACEMAKER     • IR TUNNELED CENTRAL LINE PLACEMENT  12/12/2022   • KNEE ARTHROSCOPY W/ MENISCAL REPAIR Left    • KNEE SURGERY     • LUMBAR EPIDURAL INJECTION N/A 03/17/2016    Procedure: BLOCK / INJECTION LUMBAR  L4-5  (C-ARM); Surgeon: Kam Jones MD;  Location: [facility] MAIN OR;  Service:    • ND OPTX FEM SHFT FX W/INSJ IMED IMPLT W/WO SCREW Right 01/31/2022    Procedure: INSERTION NAIL IM FEMUR ANTEGRADE (TROCHANTERIC);   Surgeon: Pascual Ferrari MD;  Location: [facility] Main OR;  Service: Orthopedics Meds/Allergies:  Prior to Admission medications    Medication Sig Start Date End Date Taking? Authorizing Provider   acetylcysteine (MUCOMYST) 200 mg/mL nebulizer solution Take 3 mL (600 mg total) by nebulization 3 (three) times a day 12/30/22   NAVA Abdi   Albuterol Sulfate (albuterol, FOR EMS ONLY,) (2 5 mg/3 mL) 0 083 % nebulizer solution Take 2 5 mg by nebulization every 6 (six) hours as needed for wheezing    Historical Provider, MD   allopurinol (ZYLOPRIM) 100 mg tablet Take 2 tablets (200 mg total) by mouth daily 1/15/22   Han Garduno MD   ceFAZolin (ANCEF) 2000 mg IVPB Inject 2,000 mg into a catheter in a vein every 8 (eight) hours for 28 days 12/20/22 1/17/23  Joyce Davies MD   docusate sodium (COLACE) 100 mg capsule Take 1 capsule (100 mg total) by mouth 2 (two) times a day 12/20/22   Joyce Davies MD   DULoxetine (CYMBALTA) 60 mg delayed release capsule TAKE 1 CAPSULE TWICE DAILY 9/26/22   Ponce Oliveira DPM   finasteride (PROSCAR) 5 mg tablet TAKE 1 TABLET DAILY 9/24/22   Yadi Bergman DO   fluticasone-umeclidinium-vilanterol (Trelegy Ellipta) 480-46 4-12 mcg/actuation AEPB inhaler Inhale 1 puff daily Rinse mouth after use   12/30/22   NAVA Abdi   Heparin Sod, Pork, Lock Flush (heparin flush in sodium acetate 0 225%) 0 5 units/mL Inject into a catheter in a vein    Historical Provider, MD   levalbuterol (XOPENEX) 1 25 mg/0 5 mL nebulizer solution Take 0 5 mL (1 25 mg total) by nebulization 3 (three) times a day 12/30/22   NAVA Abdi   lidocaine (LIDODERM) 5 % Apply 1 patch topically daily Remove & Discard patch within 12 hours 1/3/23   Yadi Bergman DO   pantoprazole (PROTONIX) 40 mg tablet Take 1 tablet (40 mg total) by mouth daily in the early morning Do not start before November 23, 2022 11/23/22   Joyce Davies MD   polyethylene glycol (MIRALAX) 17 g packet Take 17 g by mouth daily as needed    Historical Provider, MD   rivaroxaban (XARELTO) 15 mg tablet Take 1 tablet (15 mg total) by mouth daily with breakfast 19   Lynn Montano DO   saccharomyces boulardii (FLORASTOR) 250 mg capsule Take 1 capsule (250 mg total) by mouth 2 (two) times a day for 28 days 22  Adonica Hodgkins, MD   senna (SENOKOT) 8 6 mg Take 1 tablet (8 6 mg total) by mouth daily at bedtime 22   Adonica Hodgkins, MD   sodium chloride, PF, 0 9 % Inject 10 mL into a catheter in a vein Three times a day 22   Historical Provider, MD   Torsemide 40 MG TABS Take 40 mg by mouth 2 (two) times a day 22   Braden Rich DO     I have reviewed home medications using recent Epic encounter  Allergies: No Known Allergies    Social History:  Marital Status: /Civil Union     Substance Use History:   Social History     Substance and Sexual Activity   Alcohol Use Never     Social History     Tobacco Use   Smoking Status Every Day   • Packs/day: 0 25   • Years: 50 00   • Pack years: 12 50   • Types: Cigarettes   • Start date: 2022   Smokeless Tobacco Former   Tobacco Comments    quit 2021     Social History     Substance and Sexual Activity   Drug Use No       Family History:    Family History   Problem Relation Age of Onset   • Cancer Mother 80        Cancer when 80 yrs old   • Heart disease Mother    • Hypertension Mother    • Heart disease Father    • Cancer Father         Heart   46   • Diabetes Neg Hx    • Stroke Neg Hx      Physical Exam:     Vitals:        Physical Exam  Vitals reviewed  Constitutional:       Appearance: He is ill-appearing  HENT:      Head: Normocephalic and atraumatic  Mouth/Throat:      Mouth: Mucous membranes are moist       Pharynx: No oropharyngeal exudate  Eyes:      General: No scleral icterus  Extraocular Movements: Extraocular movements intact  Cardiovascular:      Rate and Rhythm: Normal rate and regular rhythm  Pulses: Normal pulses        Heart sounds: Normal heart sounds  No murmur heard  Pulmonary:      Effort: Pulmonary effort is normal  No respiratory distress  Breath sounds: Normal breath sounds  No wheezing  Abdominal:      General: Bowel sounds are normal  There is no distension  Palpations: Abdomen is soft  Tenderness: There is no abdominal tenderness  Musculoskeletal:         General: Normal range of motion  Cervical back: Normal range of motion and neck supple  Right lower leg: No edema  Left lower leg: No edema  Skin:     General: Skin is warm and dry  Coloration: Skin is pale  Neurological:      General: No focal deficit present  Mental Status: He is alert  He is disoriented  Cranial Nerves: No cranial nerve deficit  Additional Data:     Lab Results:  Results from last 7 days   Lab Units 01/12/23 0427 01/11/23  0522   WBC Thousand/uL 7 91 6 77   HEMOGLOBIN g/dL 8 3* 8 1*   HEMATOCRIT % 27 2* 26 1*   PLATELETS Thousands/uL 190 180   NEUTROS PCT %  --  78*   LYMPHS PCT %  --  12*   MONOS PCT %  --  8   EOS PCT %  --  1     Results from last 7 days   Lab Units 01/12/23  0427 01/11/23  0522 01/10/23  2129   SODIUM mmol/L 134*   < > 130*   POTASSIUM mmol/L 4 0   < > 2 6*   CHLORIDE mmol/L 96   < > 89*   CO2 mmol/L 29   < > 31   BUN mg/dL 18   < > 19   CREATININE mg/dL 1 58*   < > 1 51*   ANION GAP mmol/L 9   < > 10   CALCIUM mg/dL 8 5   < > 8 8   ALBUMIN g/dL  --   --  2 4*   TOTAL BILIRUBIN mg/dL  --   --  0 80   ALK PHOS U/L  --   --  77   ALT U/L  --   --  6*   AST U/L  --   --  21   GLUCOSE RANDOM mg/dL 171*   < > 126    < > = values in this interval not displayed                   Results from last 7 days   Lab Units 01/10/23  2120   LACTIC ACID mmol/L 1 4       Lines/Drains:  Invasive Devices     Central Venous Catheter Line  Duration           CVC Central Lines 12/12/22 Single Right Subclavian 31 days          Drain  Duration           External Urinary Catheter Small 1 day                Central Line:  Goal for removal: Will discontinue when meds requiring line are completed  Imaging: Reviewed  No orders to display       EKG and Other Studies Reviewed on Admission:   · Yes     ** Please Note: This note has been constructed using a voice recognition system   **

## 2023-01-13 NOTE — ASSESSMENT & PLAN NOTE
Wt Readings from Last 3 Encounters:   01/12/23 112 kg (246 lb)   01/03/23 113 kg (249 lb)   12/28/22 118 kg (260 lb 2 3 oz)     Recent cardiac echo with EF 60% and severe AS  Continue diuretics  Monitor volume status

## 2023-01-13 NOTE — PLAN OF CARE
Problem: RESPIRATORY - ADULT  Goal: Achieves optimal ventilation and oxygenation  Description: INTERVENTIONS:  - Assess for changes in respiratory status  - Assess for changes in mentation and behavior  - Position to facilitate oxygenation and minimize respiratory effort  - Oxygen administered by appropriate delivery if ordered  - Initiate smoking cessation education as indicated  - Encourage broncho-pulmonary hygiene including cough, deep breathe, Incentive Spirometry  - Assess the need for suctioning and aspirate as needed  - Assess and instruct to report SOB or any respiratory difficulty  - Respiratory Therapy support as indicated  Outcome: Progressing     Problem: INFECTION - ADULT  Goal: Absence or prevention of progression during hospitalization  Description: INTERVENTIONS:  - Assess and monitor for signs and symptoms of infection  - Monitor lab/diagnostic results  - Monitor all insertion sites, i e  indwelling lines, tubes, and drains  - Monitor endotracheal if appropriate and nasal secretions for changes in amount and color  - [place] appropriate cooling/warming therapies per order  - Administer medications as ordered  - Instruct and encourage patient and family to use good hand hygiene technique  - Identify and instruct in appropriate isolation precautions for identified infection/condition  Outcome: Progressing  Goal: Absence of fever/infection during neutropenic period  Description: INTERVENTIONS:  - Monitor WBC    Outcome: Progressing  Pt will be transferred to [facility] on 1/12/23  Daughter at bedside and aware

## 2023-01-13 NOTE — ASSESSMENT & PLAN NOTE
S/p pacemaker placement  • Holding Xarelto for any possible neurosurgical intervention if family decides to pursue this option  • Family is aware of Xarelto being held and patient's risk of having a stroke while off Xarelto

## 2023-01-13 NOTE — CASE MANAGEMENT
Case Management Assessment & Discharge Planning Note    Patient name Aman Weinstein  Location [facility] 606/[facility] 850-88 MRN [id number]  : 1940 Date 2023       Current Admission Date: 2023  Current Admission Diagnosis:Brain lesion   Patient Active Problem List    Diagnosis Date Noted   • Metabolic encephalopathy 81/15/6   • Brain lesion 01/10/2023   • Influenza 2022   • Anemia 2022   • Depression with anxiety 2022   • MSSA bacteremia 2022   • Abnormal CAT scan 2022   • Elevated troponin 2022   • Left hip pain 2022   • Cervical strain 2022   • Lump of skin of left lower extremity 2022   • Primary osteoarthritis of both knees 2022   • Depression 2022   • Generalized weakness 2022   • Valvular heart disease 2022   • Pressure injury of right buttock, unstageable ([facility] ) 2022   • Ambulatory dysfunction 2022   • Dysphagia, pharyngoesophageal phase 2022   • Gastro-esophageal reflux disease without esophagitis 2022   • Generalized muscle weakness 2022   • History of falling 2022   • Iron deficiency anemia 2022   • Major depressive disorder, recurrent, unspecified ([facility] ) 2022   • Moderate to severe aortic stenosis 2022   • Other abnormalities of gait and mobility 2022   • Other specified polyneuropathies 2022   • Pulmonary hypertension due to left heart disease ([facility] ) 2022   • Presence of coronary angioplasty implant and graft 2022   • Unspecified hearing loss, unspecified ear 2022   • Falls 2022   • Closed fracture of right femur ([facility] ) 2022   • COPD (chronic obstructive pulmonary disease) ([facility] ) 2021   • Chronic diastolic heart failure ([facility] ) 2021   • Memory difficulty 2021   • Neuropathy 2021   • Coronary artery arteriosclerosis 2021   • Essential hypertension 2021   • Dyspnea on exertion    • Idiopathic hematuria 04/01/2021   • Kidney stone on left side 04/01/2021   • Flank pain 04/01/2021   • Obesity (BMI 30-39 9) 04/01/2021   • Chronic constipation 02/16/2021   • Vitamin D insufficiency 11/13/2020   • Symptomatic anemia 09/13/2020   • Former smoker 01/13/2020   • Nephrolithiasis 10/30/2019   • Bilateral leg edema 09/26/2019   • Hyperuricemia 07/22/2019   • Mixed simple and mucopurulent chronic bronchitis ([facility] ) 07/17/2019   • Hypokalemia 06/24/2019   • CAD (coronary artery disease) 05/13/2019   • Status post primary angioplasty with coronary stent 05/13/2019   • Stage 3a chronic kidney disease ([facility] ) 04/18/2019   • Hyponatremia 04/08/2019   • Serum total bilirubin elevated 04/08/2019   • Peripheral arteriosclerosis ([facility] ) 01/03/2019   • Pain in both lower extremities 01/31/2018   • Benign hypertension with chronic kidney disease, stage III ([facility] ) 06/27/2017   • JOO (generalized anxiety disorder) 06/07/2016   • Chronic pain syndrome 03/17/2016   • Bilateral lumbar radiculopathy 03/17/2016   • Lumbar canal stenosis 03/17/2016   • Difficulty in walking 09/11/2015   • Persistent proteinuria 01/08/2015   • Urinary incontinence 10/30/2014   • Aneurysm of abdominal aorta 04/07/2014   • Centrilobular emphysema ([facility] ) 04/07/2014   • Deep venous insufficiency 04/07/2014   • Hyperlipidemia 04/07/2014   • Pacemaker 04/07/2014   • Fibromyalgia 08/08/2013   • 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/2013   • Fecal soiling 03/26/2013   • Class 2 obesity due to excess calories without serious comorbidity with body mass index (BMI) of 36 0 to 36 9 in adult 03/26/2013   • Chronic a-fib ([facility] ) 01/23/2013   • Allergic rhinitis 10/01/2012   • Benign prostatic hyperplasia 09/04/2012   • Carpal tunnel syndrome 09/04/2012   • Moderate or severe vision impairment, one eye 09/04/2012   • Smoker 09/04/2012   • JOVI (obstructive sleep apnea) 09/04/2012   • Venous insufficiency (chronic) (peripheral) 09/04/2012      LOS (days): 1  Geometric Mean LOS (GMLOS) (days): 8 10  Days to GMLOS:7 6     OBJECTIVE:  PATIENT READMITTED TO HOSPITAL  Risk of Unplanned Readmission Score: 35 09         Current admission status: Inpatient       Preferred Pharmacy:   [id number] [place], [address] [facility] - [address] [facility] [address] [facility],  [address] [address]  Phone: [phone] Fax: [phone]    [address], [address] [facility] [address]  [address]  [place] [address]  Phone: [phone] Fax: [phone]    [facility] [place] [address]  Phone: [phone] Fax: [phone]    Primary Care Provider: Alesia Stephen,     Primary Insurance: MEDICARE  Secondary Insurance: COMMERCIAL MISCELLANEOUS    ASSESSMENT:  [address], [address] [facility] - D[facility]   Primary Phone: [phone] (Mobile)               Advance Directives  Primary Contact: Montez  (Spouse) [phone]         Readmission Root Cause  30 Day Readmission: Yes (Pt was transfered from [facility]    Prior to that pt was readmission)  Who directed you to return to the hospital?: Family  Did you understand whom to contact if you had questions or problems?: Yes  Did you get your prescriptions before you left the hospital?: No  Reason[de-identified] Not preferred pharmacy  Were you able to get your prescriptions filled when you left the hospital?: Yes  Did you take your medications as prescribed?: Yes  Were you able to get to your follow-up appointments?: Yes  During previous admission, was a post-acute recommendation made?: Yes  What post-acute resources were offered?: STR  Patient was readmitted due to: metabolic encephalopathy, dizziness, falls  Action Plan: medical management - referrals for STR placed, family to decide on home vs STR pending progress    Patient Information  Admitted from[de-identified] Home  Mental Status: Alert, Confused  During Assessment patient was accompanied by: Spouse  Assessment information provided by[de-identified] Spouse  Primary Caregiver: Spouse  Caregiver's Name[de-identified] Tere Mccoy (spouse)  Caregiver's Relationship to Patient[de-identified] Family Member  Caregiver's Telephone Number[de-identified] [phone]  Support Systems: Spouse/significant other, Daughter  [place] of Residence: [address] [place] [address] do you live in?: [place] [address] entry access options  Select all that apply : Stairs  Number of steps to enter home : 2  Type of Current Residence: 2 story home  Upon entering residence, is there a bedroom on the main floor (no further steps)?: Yes  Upon entering residence, is there a bathroom on the main floor (no further steps)?: Yes  In the last 12 months, was there a time when you were not able to pay the mortgage or rent on time?: No  In the last 12 months, how many places have you lived?: 1  In the last 12 months, was there a time when you did not have a steady place to sleep or slept in a shelter (including now)?: No  Homeless/housing insecurity resource given?: N/A  Living Arrangements: Lives w/ Spouse/significant other, Lives w/ Daughter    Activities of Daily Living Prior to Admission  Functional Status: Assistance  Completes ADLs independently?: No  Level of ADL dependence: Assistance  Ambulates independently?: No  Level of ambulatory dependence: Assistance  Does patient use assisted devices?: Yes  Assisted Devices (DME) used: Bedside Commode, Walker, Wheelchair  Does patient currently own DME?: Yes  What DME does the patient currently own?: Jaquita Riedel, Wheelchair  Does patient have a history of Outpatient Therapy (PT/OT)?: No  Does the patient have a history of Short-Term Rehab?: Yes  Does patient have a history of HHC?: Yes ([facility];  [facility])  Does patient currently have [address]?: Yes ([facility])    Current Home Health Care  Type of Current Home Care Services: Nurse visit, Home PT  [address][de-identified] [facility]  Current Home Health Follow-Up Provider[de-identified] PCP    Patient Information Continued  Income Source: Pension/[employer]  Does patient have prescription coverage?: Yes  Within the past 12 months, you worried that your food would run out before you got the money to buy more : Never true  Within the past 12 months, the food you bought just didn't last and you didn't have money to get more : Never true  Food insecurity resource given?: N/A  Does patient receive dialysis treatments?: No  Does patient have a history of substance abuse?: No  Does patient have a history of Mental Health Diagnosis?: No         Means of Transportation  Means of Transport to Appts[de-identified] Family transport  In the past 12 months, has lack of transportation kept you from medical appointments or from getting medications?: No  In the past 12 months, has lack of transportation kept you from meetings, work, or from getting things needed for daily living?: No        DISCHARGE DETAILS:    Discharge planning discussed with[de-identified] Wife Alban Shells of Choice: Yes  Comments - [place] of Choice: [address] reviewed with wife Eva Maloney and daughter BODØ over the phone  Family now in agreement with STR and are interested in [facility] in [place]  CM explained this is an FDC and right now SNF is the recommendation  Offered to send blanket ref in Aidin to determine choices  Family in agreement  They do not want info sent to [facility] or [facility]  Explained that [facility] is continuing care [facility] and they have SNF in [place] and they will want a SNF application in order to consider patient for SNF  Discussed SNF coverage for STR  CM emailed application to daughter BODØ to [employer] and requested once completed she send back to     CM following  CM contacted family/caregiver?: Yes  Were Treatment Team discharge recommendations reviewed with patient/caregiver?: Yes  Did patient/caregiver verbalize understanding of patient care needs?: Yes  Were patient/caregiver advised of the risks associated with not following Treatment Team discharge recommendations?: Yes    Contacts  Patient Contacts: Jarocho Hicks (spouse) / daughter Alina Hurt was also present in the home and Millicent passed phone to her to discuss d/c plan  Relationship to Patient[de-identified] Family  Contact Method: Phone  Phone Number: [phone]  Reason/Outcome: Continuity of Care, Discharge Planning, Emergency Contact, Referral    Requested [address]         Is the patient interested in [facility] at discharge?:  (If pt does home - family would like to [facility] to [facility])         Other Referral/Resources/Interventions Provided:  Interventions: Short Term Rehab, SNF  Referral Comments: [facility] ref sent in Aidin         Treatment Team Recommendation: Short Term Rehab  Discharge Destination Plan[de-identified] Short Term Rehab

## 2023-01-13 NOTE — CONSULTS
Consultation - Infectious Disease   Ernie Elizabeth 80 y o  male MRN: [id number]  Unit/Bed#: [facility] 606-01 Encounter: [id number]      IMPRESSION & RECOMMENDATIONS:     1  Brain lesions, probable abscesses   Most likely complication of recent MSSA bacteremia, as below  Patient presented with weakness, falls, confusion , CT head with contrast shows new enhancing lesions in the left parasagittal parietal lobe as well as right inferior paramedian cerebellar hemisphere was associated edema  Patient is unable to have MRI due to pacemaker   Fortunately, he is afebrile and clinically stable, with no meningismus or overt focal neurologic deficits  Mental status is somewhat improving  Neurosurgery recommended possible stereotactic biopsy but the patient's family would like to avoid invasive procedures and monitor his response to antibiotics  -continue IV nafcillin 2 g every 4 hours   -appreciate neurosurgery follow-up, plan for repeat CT head next week  -Follow-up pending blood cultures  -Serial neuro exams  -Will require prolonged treatment course  -I had a long discussion with the family about clinical situation and concern for ongoing septic emboli to the lungs and now the CNS despite weeks of antibiotics  Without device removal, which would be risky, he may continue to have embolic events  They are realistic about the complicated situation going forward if he continues to decline and source control cannot be achieved     2  MSSA bacteremia   Positive blood cultures from 12/5/2022  Otis Damon originated from skin breakdown on arms and legs   Complicated by presence of pacemaker    Recent MARY showed no definitive large vegetations but could not exclude small lesions   Recent CT chest showed numerous pulmonary nodules concerning for septic emboli   Family opted to pursue conservative approach with retention of pacemaker since would be a very difficult procedure to remove   Patient has been receiving outpatient cefazolin via PICC line prior to this admission  1/10/23 he presented with confusion found to have #1   Patient is afebrile and clinically stable  Repeat blood cultures are negative but imaging concerning for ongoing pulmonary and now CNS septic emboli  Unusual for him to have right and left sided embolic phenomenon unless MV/AV and PPM lead are seeded  -Antibiotic plan as above  -Follow-up new blood cultures from this admission to guide further recommendations     3   Pacemaker in place   As above, MARY showed no overt vegetations   However, high suspicion for pacer infection   Patient and family opted to retain pacer on last admission   But now complicated by #1  -Antibiotic plan as above  -Would consider reevaluating patient for extraction due to high suspicion for pacer infection and ongoing septic emboli despite several weeks of antibiotic therapy; recommend formal EP consult  -Follow-up repeat TTE     4   Bilateral pulmonary nodules   Likely septic emboli in the setting of recent MSSA bacteremia with some nodules enlarged while others decrease in size on serial CTs most recent on 1/12/23  -Antibiotic plan as above  -Will need continued follow-up imaging in the future reassess for treatment response     5   CKD 3   Creatinine remains relatively stable   -No dose adjustment needed for nafcillin  -Follow BMP    I have discussed the above management plan in detail with the primary service and family at bedside  ID will follow  I have performed an extensive review of the medical records in Epic including review of the notes, radiographs, and laboratory results     HISTORY OF PRESENT ILLNESS:  Reason for Consult: brain lesion  HPI: Gladys Santacruz is an 80year old man with a history of PPM insertion, COPD, CHF, CKD, longstanding tobacco abuse  The patient was initially admitted to the [address] [place] [address] 12/5 to 12/20/2022 with weakness at home  The patient was found to have MSSA bacteremia   CT chest showed numerous new pulmonary nodules and mediastinal and hilar lymphadenopathy, concerning for metastases  CTH with contrast showed non enhancing subcortical hypodensities representing chronic infarct  Patient was unable to get an 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  MARY with no obvious large vegetations, but given significant calcification cannot rule out small lesion  Discussed treatment options with the family and Cardiology   PPM removal is recommended in the setting of staph bacteremia regardless of the primary source, however the pacemaker has been in place 12 years and removal would be an extensive surgical procedure  Given his comorbidities, he and his family elected to pursue a more conservative approach with antibiotics before any surgery  The patient was discharged to rehab on a 6-week course of IV cefazolin through 1/17/2023  The patient was readmitted from rehab on 12/24/2022 with a CHF exacerbation and influenza A infection  He was ultimately discharged home 12/29/22  The patient then returned to the [address] [place] [address] ED 1/10/2023 due to several days of worsening confusion, generalized weakness, 2 mechanical falls  He had no fevers, chills, focal weakness or numbness  CT head with contrast showed new enhancing lesions in the left parasagittal parietal lobe as well as right inferior paramedian cerebellar hemisphere was associated edema  Patient was afebrile without leukocytosis  Antibiotics was switched to nafcillin  Repeat blood cultures show no growth  CT chest shows multiple bilateral pulmonary nodules, some of which have enlarged while others have decreased from prior scan  The patient was transferred to [place] [address]  1/12/2023 for neurosurgical evaluation  Neurosurgery is recommending stereotactic aspiration of likely left occipital brain abscess but the patient's family is hesitant for him to undergo an invasive procedure and would like to see how he responds to IV antibiotics  They feel that his mental status is slowly improving today  He is still somewhat confused but was able to answer some yes or no questions for me  He denies fever, chills, chest pain  Has mild shortness of breath  REVIEW OF SYSTEMS:  A complete review of systems is negative other than that noted in the HPI  PAST MEDICAL HISTORY:  Past Medical History:   Diagnosis Date   • Arthritis    • Atrial flutter ([facility] )    • Chronic kidney disease     stage 3   • Coronary artery disease     2 stents   • Fluid retention    • Gout    • Heart disease    • Heart failure (HCC)     pacemaker   • Hypertension    • Hyponatremia 04/08/2019   • Neurological disorder    • Pacemaker    • Pulmonary emphysema ([facility] )    • Radiculopathy     last assessed 1/28/16    • Shortness of breath     exertion   • Sleep apnea     c pap     Past Surgical History:   Procedure Laterality Date   • ANGIOPLASTY      x2 2 stents and then replaced   • CARDIAC PACEMAKER PLACEMENT      pacemaker permanent placement dual chamber / last assessed 4/7/14 / implantation    • CARDIAC SURGERY      pacemaker   • CHOLECYSTECTOMY     • CORONARY ANGIOPLASTY WITH STENT PLACEMENT     • EPIDURAL BLOCK INJECTION N/A 05/26/2016    Procedure: BLOCK / INJECTION EPIDURAL STEROID LUMBAR  L4-5;  Surgeon: Danny Telles MD;  Location: [facility] MAIN OR;  Service:    • EPIDURAL BLOCK INJECTION N/A 02/14/2019    Procedure: L4 L5 Lumbar Epidural Steroid Injection;  Surgeon: Danny Telles MD;  Location: [facility] MAIN OR;  Service: Pain Management    • EYE SURGERY      cataract left   • HAND SURGERY     • HIP PINNING Left    • INSERT / REPLACE / REMOVE PACEMAKER     • IR TUNNELED CENTRAL LINE PLACEMENT  12/12/2022   • KNEE ARTHROSCOPY W/ MENISCAL REPAIR Left    • KNEE SURGERY     • LUMBAR EPIDURAL INJECTION N/A 03/17/2016    Procedure: BLOCK / INJECTION LUMBAR  L4-5  (C-ARM);   Surgeon: Danny Telles MD;  Location: [facility] MAIN OR;  Service:    • CA OPTX FEM SHFT FX W/INSJ IMED IMPLT W/WO SCREW Right 2022    Procedure: INSERTION NAIL IM FEMUR ANTEGRADE (TROCHANTERIC); Surgeon: Nataly Almeida MD;  Location[facility] Main OR;  Service: Orthopedics       FAMILY HISTORY:  Non-contributory    SOCIAL HISTORY:  Social History   Social History     Substance and Sexual Activity   Alcohol Use Never     Social History     Substance and Sexual Activity   Drug Use No     Social History     Tobacco Use   Smoking Status Every Day   • Packs/day: 0 25   • Years: 50 00   • Pack years: 12 50   • Types: Cigarettes   • Start date: 2022   Smokeless Tobacco Former   Tobacco Comments    quit 2021       ALLERGIES:  No Known Allergies    MEDICATIONS:  All current active medications have been reviewed  PHYSICAL EXAM:  Temp:  [99 9 °F (37 7 °C)] 99 9 °F (37 7 °C)  HR:  [81-84] 81  Resp:  [17-24] 24  BP: (146-150)/(71-80) 146/71  SpO2:  [94 %-99 %] 98 %  Temp (24hrs), Av 9 °F (37 7 °C), Min:99 9 °F (37 7 °C), Max:99 9 °F (37 7 °C)  Current: Temperature: 99 9 °F (37 7 °C)  No intake or output data in the 24 hours ending 23 1430    General Appearance:  Ill appearing, lethargic   Head:  Normocephalic, without obvious abnormality, atraumatic   Eyes:  Conjunctiva pink and sclera anicteric, both eyes   Nose: Nares normal, mucosa normal, no drainage   Throat: Oropharynx moist without lesions   Neck: Supple, symmetrical, no adenopathy, no tenderness/mass/nodules   Back:   Symmetric, no curvature, ROM normal, no CVA tenderness   Lungs:   Coarse breath sounds bilaterally, tachypneic   Chest Wall:  No tenderness or deformity   Heart:  RRR; no murmur, rub or gallop   Abdomen:   Soft, non-tender, non-distended, positive bowel sounds    Extremities: No cyanosis, clubbing or edema   Skin: No rashes or lesions  No draining wounds noted     Lymph nodes: Cervical, supraclavicular nodes normal   Neurologic: Lethargic but arousable       LABS, IMAGING, & OTHER STUDIES:  Lab Results:  I have personally reviewed pertinent labs  Results from last 7 days   Lab Units 01/13/23  0605 01/12/23  0427 01/11/23  0522   WBC Thousand/uL 6 87 7 91 6 77   HEMOGLOBIN g/dL 8 3* 8 3* 8 1*   PLATELETS Thousands/uL 202 190 180     Results from last 7 days   Lab Units 01/13/23  0605 01/12/23  0427 01/11/23  0522 01/10/23  2129   SODIUM mmol/L 136 134* 135 130*   POTASSIUM mmol/L 3 2* 4 0 2 9* 2 6*   CHLORIDE mmol/L 98 96 94* 89*   CO2 mmol/L 32 29 30 31   BUN mg/dL 18 18 18 19   CREATININE mg/dL 1 55* 1 58* 1 42* 1 51*   EGFR ml/min/1 73sq m 41 40 45 42   CALCIUM mg/dL 8 5 8 5 8 2* 8 8   AST U/L 16  --   --  21   ALT U/L <6*  --   --  6*   ALK PHOS U/L 68  --   --  77     Results from last 7 days   Lab Units 01/13/23  0105 01/10/23  2128 01/10/23  2120   BLOOD CULTURE  Received in Microbiology Lab  Culture in Progress  Received in Microbiology Lab  Culture in Progress  No Growth at 48 hrs  No Growth at 48 hrs  Results from last 7 days   Lab Units 01/11/23  0522   CRP mg/L 20 3*               Imaging Studies:   I have personally reviewed pertinent imaging study reports and images in PACS  [address] [place] [address] with contrast 1/11:  Peripherally enhancing lesion within the left parasagittal parietal lobe with surrounding vasogenic edema, similar to the examination from the day before and new since 12/15/2022  Interval development of an ill-defined enhancing lesion within the right inferior paramedian cerebellar hemisphere with mild associated edema

## 2023-01-13 NOTE — ASSESSMENT & PLAN NOTE
Sodium 130 --> 134 today  · Secondary to decreased oral intake, dehydration  · Discontinued gentle IVF  · Recheck in Am    Results from last 7 days   Lab Units 01/12/23  0427 01/11/23  0522 01/10/23  2126   SODIUM mmol/L 134* 135 130*

## 2023-01-13 NOTE — ASSESSMENT & PLAN NOTE
· K+ 3 2 today   · Replete with 20 mEq IV potassium and 20 mEq PO KDUR  · Obtain repeat BMP in the AM  · Mag level WNL

## 2023-01-13 NOTE — ASSESSMENT & PLAN NOTE
On recent admission noted to have MSSA bacteremia  • Plan was for 6 weeks of IV antibiotic cefazolin through 1/17/2023  • Patient underwent TTE and MARY on prior admission  • Negative repeat blood culture  • IV cefazolin was changed to IV nafcillin

## 2023-01-13 NOTE — ASSESSMENT & PLAN NOTE
Lab Results   Component Value Date    EGFR 41 01/13/2023    EGFR 40 01/12/2023    EGFR 45 01/11/2023    CREATININE 1 55 (H) 01/13/2023    CREATININE 1 58 (H) 01/12/2023    CREATININE 1 42 (H) 01/11/2023     · Baseline creatinine 1 3-1 6  · Cr   Stable at 1 55 today   · Trend BMP intermittently   · Continue home torsemide 40 mg daily

## 2023-01-13 NOTE — ASSESSMENT & PLAN NOTE
Lab Results   Component Value Date    EGFR 40 01/12/2023    EGFR 45 01/11/2023    EGFR 42 01/10/2023    CREATININE 1 58 (H) 01/12/2023    CREATININE 1 42 (H) 01/11/2023    CREATININE 1 51 (H) 01/10/2023     Baseline creatinine 1 3-1 6  · Creatinine continues to remain at baseline  · Repeat labs in a m

## 2023-01-13 NOTE — DISCHARGE SUMMARY
Candidatuliosahil 45  Discharge- Gladys Santacruz 1940, 80 y o  male MRN: [id number]  Unit/Bed#: [address] [place] [address] Encounter: [id number]  Primary Care Provider: Lizette Estrada DO   Date and time admitted to hospital: 1/10/2023  7:43 PM    * Brain lesion  Assessment & Plan  Patient presented with 5-7 days of confusion, generalized weakness, decreased oral intake  · CT head: 2 cm mass in the posterior left paramedian parietal lobe with adjacent vasogenic edema which is new  intracranial infection/developing abscess vs mets  · Stat CT head with IV contrast showed enhancing lesion within left parasagittal parietal lobe with vasogenic edema and enhancing lesion within right inferior paramedian cerebellar hemisphere -infection with abscess formation vs mets, but most likely infectious in etiology  · Unable to get MRI due to pacemaker which is not compatible per prior documentation  · Continue nafcillin   · Per neurosurgery, CT chest/abdomen/pelvis with IV contrast was done which showed multiple bilateral pulmonary nodules which are suggestive of infectious/inflammatory etiology  Mild interval improvement of moderate right pleural effusion, atelectasis, persistent mediastinal adenopathy, moderate rectal stool burden, infrarenal abdominal aneurysm   · ESR 56, CRP 20  · Neurosurgery recommended transferring patient over to [place] for possible stereotactic biopsy/aspiration of mass  daughter spoke with neurosurgery as well and family would like to hold off on any diagnostic testing at this time  Patient's daughter prefers continuing treatment with antibiotics with serial imaging to see if the lesions eventually decrease in size or resolve    They will decide on diagnostic testing based on how patient responds to antibiotics alone  · Repeat blood cultures neg so far  · Rpt TTE results pending      MSSA bacteremia  Assessment & Plan  Found to have MSSA bacteremia  · Discontinued IV ancef and currently on nafcillin   · TTE,MARY done on prior admission    Chronic diastolic heart failure (HCC)  Assessment & Plan  Wt Readings from Last 3 Encounters:   01/12/23 112 kg (246 lb)   01/03/23 113 kg (249 lb)   12/28/22 118 kg (260 lb 2 3 oz)     Demadex initially held but today restarted at a lower dose of 40 mg daily due to decreased p o  intake and severe hypokalemia initially  Later in the evening patient noted to be slightly tachypneic with rales on exam   20 mg of IV Lasix x1 given as well  I/O        Elevated troponin  Assessment & Plan  Initial troponin 92, trended down to 83  · No chest pain or EKG changes per prior provider    COPD (chronic obstructive pulmonary disease) ([facility] [address] )  Assessment & Plan  No exacerbation, stable on room air  · On trelegy, substituted with breo and incruse  · Continue scheduled nebulizer treatments  Hypokalemia  Assessment & Plan  Patient with severe hypokalemia initially  · Potassium level normalized status post repletion  · Recheck in AM    Stage 3a chronic kidney disease [facility])  Assessment & Plan  Lab Results   Component Value Date    EGFR 40 01/12/2023    EGFR 45 01/11/2023    EGFR 42 01/10/2023    CREATININE 1 58 (H) 01/12/2023    CREATININE 1 42 (H) 01/11/2023    CREATININE 1 51 (H) 01/10/2023     Baseline creatinine 1 3-1 6  · Creatinine continues to remain at baseline  · Repeat labs in a m  Hyponatremia  Assessment & Plan  Sodium 130 --> 134 today  · Secondary to decreased oral intake, dehydration  · Discontinued gentle IVF  · Recheck in Am    Results from last 7 days   Lab Units 01/12/23  0427 01/11/23  0522 01/10/23  2129   SODIUM mmol/L 134* 135 130*       Falls  Assessment & Plan  Patient with 2 mechanical falls at home day prior to admission  · Fall precautions    · PT/OT    JOVI (obstructive sleep apnea)  Assessment & Plan  JOVI on CPAP hs     Chronic a-fib (HCC)  Assessment & Plan  S/p pacemaker placement  · Holding Xarelto for any possible neurosurgical intervention if family decides to pursue this option  · Family is aware of Xarelto being held and patient's risk of having a stroke while off Xarelto    Medical Problems     Resolved Problems  Date Reviewed: 1/12/2023   None       Discharging Physician / Practitioner: Carlota Rendon DO  PCP: Magan Velazquez DO  Admission Date:   Admission Orders (From admission, onward)     Ordered        01/10/23 2243  [facility]  Once                      Discharge Date: 01/12/23    Consultations During Hospital Stay:  · ID  · Neurosurgery    Procedures Performed:   · Echo    Incidental Findings:   · Infrarenal abdominal aneurysm  · Pulmonary nodules  · Persistent mediastinal adenopathy    Test Results Pending at Discharge (will require follow up):   · TTE     Outpatient Tests Requested:  · N/A    Reason for Admission: Falls, confusion    Hospital Course:   Glory Crigler is a 80 y o  male patient who originally presented to the hospital on 1/10/2023 due to Multiple falls at home, confusion  Patient had 2 mechanical falls at home the day prior to admission  Patient had decreased p o  intake at home  Per family patient was initially doing better since discharge from prior hospitalization but over the last 5 to 7 days prior to admission started to decline again  On imaging patient noted to have brain lesion which is likely abscess  ID consulted and antibiotic changed to IV nafcillin  Case was also discussed with neurosurgery who recommended transfer [facility]  Discussed with patient's family yesterday and daughter wanted to speak with the rest of the family before making the decision to transfer him  After discussion today, his family members are in agreement with transfer  Risk/benefits of transfer discussed with patient's daughters including Giuliapacheco Olivera who signed consent form  Please see above list of diagnoses and related plan for additional information       Condition at Discharge: stable    Discharge Day Visit / Exam:   Subjective: Patient seen on multiple occasions today  Appears confused compared to baseline  Appears short of breath    Vitals: Blood Pressure: 123/60 (01/12/23 1345)  Pulse: 79 (01/12/23 1345)  Temperature: 98 5 °F (36 9 °C) (01/11/23 2248)  Temp Source: Oral (01/11/23 0915)  Respirations: 18 (01/12/23 0711)  Height: 6' 2" (188 cm) (01/12/23 1345)  Weight - Scale: 112 kg (246 lb) (01/12/23 1345)  SpO2: 90 % (01/12/23 1331)  Exam:   Physical Exam  Vitals reviewed  Constitutional:       General: He is not in acute distress  Appearance: He is ill-appearing (chronically)  HENT:      Head: Normocephalic and atraumatic  Cardiovascular:      Rate and Rhythm: Normal rate and regular rhythm  Heart sounds: Murmur heard  Pulmonary:      Breath sounds: Rales present  No wheezing  Comments: Decreased breath sounds bilaterally, mildly tachypneic  Abdominal:      General: Bowel sounds are normal  There is no distension  Palpations: Abdomen is soft  Tenderness: There is no abdominal tenderness  Musculoskeletal:      Right lower leg: Edema present  Left lower leg: Edema present  Skin:     Comments: Multiple areas of ecchymosis   Neurological:      Comments: Sleepy at times but easily arousable and answering questions  oriented to self  Communicative but conversation does not always make sense and pertaining to the question asked         Discussion with Family: Updated  (daughter) at bedside  - multiple daughters    Discharge instructions/Information to patient and family:   See after visit summary for information provided to patient and family  Provisions for Follow-Up Care:  See after visit summary for information related to follow-up care and any pertinent home health orders  Disposition:   [address] Transfer to [facility]     Discharge Statement:  I spent > 30 minutes discharging the patient   This time was spent on the day of discharge  I had direct contact with the patient on the day of discharge  Greater than 50% of the total time was spent examining patient, answering all patient questions, arranging and discussing plan of care with patient as well as directly providing post-discharge instructions  Additional time then spent on discharge activities  Discharge Medications:  See after visit summary for reconciled discharge medications provided to patient and/or family        **Please Note: This note may have been constructed using a voice recognition system**

## 2023-01-13 NOTE — PLAN OF CARE
Problem: Prexisting or High Potential for Compromised Skin Integrity  Goal: Skin integrity is maintained or improved  Description: INTERVENTIONS:  - Identify patients at risk for skin breakdown  - Assess and monitor skin integrity  - Assess and monitor nutrition and hydration status  - Monitor labs   - Assess for incontinence   - Turn and reposition patient  - Assist with mobility/ambulation  - Relieve pressure over bony prominences  - Avoid friction and shearing  - Provide appropriate hygiene as needed including keeping skin clean and dry  - Evaluate need for skin moisturizer/barrier cream  - Collaborate with interdisciplinary team   - Patient/family teaching  - Consider wound care consult   Outcome: Progressing     Problem: MOBILITY - ADULT  Goal: Maintain or return to baseline ADL function  Description: INTERVENTIONS:  -  Assess patient's ability to carry out ADLs; assess patient's baseline for ADL function and identify physical deficits which impact ability to perform ADLs (bathing, care of mouth/teeth, toileting, grooming, dressing, etc )  - Assess/evaluate cause of self-care deficits   - Assess range of motion  - Assess patient's mobility; develop plan if impaired  - Assess patient's need for assistive devices and provide as appropriate  - Encourage maximum independence but intervene and supervise when necessary  - Involve family in performance of ADLs  - Assess for home care needs following discharge   - Consider OT consult to assist with ADL evaluation and planning for discharge  - Provide patient education as appropriate  Outcome: Progressing  Goal: Maintains/Returns to pre admission functional level  Description: INTERVENTIONS:  - Perform BMAT or MOVE assessment daily    - Set and communicate daily mobility goal to care team and patient/family/caregiver     - Collaborate with rehabilitation services on mobility goals if consulted    - Out of bed for toileting  - Record patient progress and toleration of activity level   Outcome: Progressing     Problem: Potential for Falls  Goal: Patient will remain free of falls  Description: INTERVENTIONS:  - Educate patient/family on patient safety including physical limitations  - Instruct patient to call for assistance with activity   - Consult OT/PT to assist with strengthening/mobility   - Keep Call bell within reach  - Keep bed low and locked with side rails adjusted as appropriate  - Keep care items and personal belongings within reach  - Initiate and maintain comfort rounds  - Make Fall Risk Sign visible to staff  - Apply yellow socks and bracelet for high fall risk patients  - Consider moving patient to room near nurses station  Outcome: Progressing

## 2023-01-13 NOTE — ASSESSMENT & PLAN NOTE
Found to have MSSA bacteremia  · Discontinued IV ancef and currently on nafcillin   · TTE,MARY done on prior admission

## 2023-01-13 NOTE — ASSESSMENT & PLAN NOTE
• Resolved, likely in setting of volume depletion   • IVF have been discontinued, continue to monitor off fluid  • Continue on home torsemide 40 mg daily as BP allows   • Monitor BMP

## 2023-01-13 NOTE — ASSESSMENT & PLAN NOTE
Wt Readings from Last 3 Encounters:   01/12/23 112 kg (246 lb)   01/03/23 113 kg (249 lb)   12/28/22 118 kg (260 lb 2 3 oz)     · Recent cardiac echo with EF 60% and severe AS  · Continue diuretics with torsemide 40 mg daily   · Monitor volume status

## 2023-01-13 NOTE — ASSESSMENT & PLAN NOTE
· Noted on AM BMP  · Check hgbA1c  · Fingersticks QID, if persistent hyperglycemia consideration to add SSI coverage  · Monitor

## 2023-01-14 PROBLEM — E87.1 HYPONATREMIA: Status: RESOLVED | Noted: 2019-04-08 | Resolved: 2023-01-14

## 2023-01-14 NOTE — ASSESSMENT & PLAN NOTE
Lab Results   Component Value Date    EGFR 37 01/14/2023    EGFR 41 01/13/2023    EGFR 40 01/12/2023    CREATININE 1 69 (H) 01/14/2023    CREATININE 1 55 (H) 01/13/2023    CREATININE 1 58 (H) 01/12/2023     · Baseline creatinine 1 3-1 6  · Cr  1 69 today   · Trend BMP intermittently   · Continue home torsemide 40 mg daily for now

## 2023-01-14 NOTE — ASSESSMENT & PLAN NOTE
· K+ 3 3 today   · Replete with 60 mEq total PO KDUR  · Obtain repeat BMP in the AM  · Mag level previously WNL, check in AM as well

## 2023-01-14 NOTE — CONSULTS
EPS Consultation/New Patient Evaluation - Cody Grijalva 80 y o  male MRN: [id number]           ASSESSMENT:  1  Brain lesion  Inpatient consult to Infectious Diseases    Inpatient consult to Neurosurgery    Inpatient consult to Infectious Diseases    Inpatient consult to Neurosurgery      2  MSSA bacteremia  Inpatient consult to Infectious Diseases    Inpatient consult to Neurosurgery    Inpatient consult to Infectious Diseases    Inpatient consult to Neurosurgery    Inpatient consult to Electrophysiology    Inpatient consult to Electrophysiology              PLAN:  Chest x-ray reviewed he appears to have 2 leads in situ one of them is probably a fine-line which can be difficult to extract  I had a lengthy discussion with the daughter and explained that while he would be at high risk for extraction related complication simply based on his comorbidities if his pacemaker is indeed infected and he has recurrent bacteremia or bacteremia that he cannot clear then taking the hardware out gives him the best chance for recovery and elimination of his infection  He likely would not be a candidate for open heart surgery should there be a complication given his potential brain abscess and septic emboli as he could not be anticoagulated  It would be reasonable to remove his device as long as family clearly understands the inherent risks of the procedure  Lead extraction can be performed next week if it is felt that his pacemaker  should be removed  Would consider repeat transesophageal echocardiogram given embolic phenomenon    CC/HPI:   Consultation requested for lead extraction  Patient has history of bacteremia dating December 5  He presented with presumed septic emboli to lungs and brain  Per infectious disease there is 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      According to daughter who accompanies patient in room the last few weeks he was doing well and was ambulating and alert until he developed acute change in mental status for which she was brought to [address] [place] [address]  I do not have complete information on his pacemaker however it was placed approximately 12 years ago with recent generator replacement  ROS   Unobtainable secondary to patient being obtunded    Objective:     Vitals: Blood pressure 127/58, pulse 80, temperature 99 4 °F (37 4 °C), resp  rate (!) 24, SpO2 100 %  , There is no height or weight on file to calculate BMI ,   Orthostatic Blood Pressures    Flowsheet Row Most Recent Value   Blood Pressure 127/58 filed at 01/14/2023 0810             Physical Exam:    GEN: Cody Grijalva appears ill    He is responsive and attempts to answer questions HEENT: pupils equal, round, and reactive to light; extraocular muscles intact  NECK: supple, no carotid bruits   HEART: regular rhythm, normal S1 and S2, no murmurs, clicks, gallops or rubs   LUNGS: Scattered rhonchi throughout    EXTREMITIES: Poor nail care  NEURO: no focal findings   SKIN: Ecchymosis is intact throughout body but area over pacemaker appears well-healed  Medications:      Current Facility-Administered Medications:   •  acetaminophen (TYLENOL) tablet 650 mg, 650 mg, Oral, Q6H PRN, Elgin Robin, DO  •  acetylcysteine (MUCOMYST) 200 mg/mL inhalation solution 600 mg, 3 mL, Nebulization, TID, Elgin Robin, DO, 600 mg at 01/14/23 0805  •  allopurinol (ZYLOPRIM) tablet 200 mg, 200 mg, Oral, Daily, Elgin Robin, DO, 200 mg at 01/14/23 0807  •  docusate sodium (COLACE) capsule 100 mg, 100 mg, Oral, BID, Elgin Robin, DO, 100 mg at 01/14/23 0807  •  DULoxetine (CYMBALTA) delayed release capsule 60 mg, 60 mg, Oral, BID, Elgin Robin, DO, 60 mg at 01/14/23 9076  •  finasteride (PROSCAR) tablet 5 mg, 5 mg, Oral, Daily, Elgin Robin, DO, 5 mg at 01/14/23 0807  •  fluticasone-vilanterol 200-25 mcg/actuation 1 puff, 1 puff, Inhalation, Daily, Elgin Robin, DO  •  levalbuterol (XOPENEX) inhalation solution 1 25 mg, 1 25 mg, Nebulization, TID, Андрей Stewart DO, 1 25 mg at 23 0804  •  nafcillin (NALLPEN) 2,000 mg in sodium chloride 0 9 % 100 mL IVPB, 2,000 mg, Intravenous, Q4H, Андрей Stewart DO, Last Rate: 100 mL/hr at 23 0814, 2,000 mg at 23 2988  •  pantoprazole (PROTONIX) EC tablet 40 mg, 40 mg, Oral, Early Morning, Андрей Stewart DO, 40 mg at 23 0540  •  polyethylene glycol (MIRALAX) packet 17 g, 17 g, Oral, Daily, Андрей Stewart, DO, 17 g at 23 0807  •  saccharomyces boulardii (FLORASTOR) capsule 250 mg, 250 mg, Oral, BID, Андрей Stewart, DO, 250 mg at 23 0807  •  senna (SENOKOT) tablet 8 6 mg, 8 6 mg, Oral, HS, Андрей Stewart DO  •  sodium chloride (PF) 0 9 % injection 10 mL, 10 mL, Intravenous, Q8H [address], Андрей Stewart DO, 10 mL at 23 1651  •  torsemide (DEMADEX) tablet 40 mg, 40 mg, Oral, Daily, Андрей Stewart DO, 40 mg at 23 0807  •  umeclidinium 62 5 mcg/actuation inhaler AEPB 1 puff, 1 puff, Inhalation, Daily, Андрей Stewart DO     Family History   Problem Relation Age of Onset   • Cancer Mother 80        Cancer when 80 yrs old   • Heart disease Mother    • Hypertension Mother    • Heart disease Father    • Cancer Father         Heart   46   • Diabetes Neg Hx    • Stroke Neg Hx      Social History     Socioeconomic History   • Marital status: /Civil Union     Spouse name: Not on file   • Number of children: Not on file   • Years of education: Not on file   • Highest education level: Not on file   Occupational History   • Occupation: RETIRED   Tobacco Use   • Smoking status: Every Day     Packs/day: 0 25     Years: 50 00     Pack years: 12 50     Types: Cigarettes     Start date: 2022   • Smokeless tobacco: Former   • Tobacco comments:     quit 2021   Vaping Use   • Vaping Use: Never used   Substance and Sexual Activity   • Alcohol use: Never   • Drug use: No   • Sexual activity: Yes     Partners: Female     Birth control/protection: None   Other Topics Concern   • Not on file Social History Narrative    Daily tea consumption 10 cups day      Social Determinants of Health     Financial Resource Strain: Not on file   Food Insecurity: No Food Insecurity   • Worried About Running Out of Food in the Last Year: Never true   • Ran Out of Food in the Last Year: Never true   Transportation Needs: No Transportation Needs   • Lack of Transportation (Medical): No   • Lack of Transportation (Non-Medical): No   Physical Activity: Not on file   Stress: Not on file   Social Connections: Not on file   Intimate Partner Violence: Not on file   Housing Stability: Low Risk    • Unable to Pay for Housing in the Last Year: No   • Number of Places Lived in the Last Year: 1   • Unstable Housing in the Last Year: No     Social History     Tobacco Use   Smoking Status Every Day   • Packs/day: 0 25   • Years: 50 00   • Pack years: 12 50   • Types: Cigarettes   • Start date: 1/1/2022   Smokeless Tobacco Former   Tobacco Comments    quit 02/14/2021     Social History     Substance and Sexual Activity   Alcohol Use Never       Labs & Results:  Below is the patient's most recent value for Albumin, ALT, AST, BUN, Calcium, Chloride, Cholesterol, CO2, Creatinine, GFR, Glucose, HDL, Hematocrit, Hemoglobin, Hemoglobin A1C, LDL, Magnesium, Phosphorus, Platelets, Potassium, PSA, Sodium, Triglycerides, and WBC     Lab Results   Component Value Date    ALT <6 (L) 01/14/2023    AST 15 01/14/2023    BUN 20 01/14/2023    CALCIUM 8 5 01/14/2023     01/14/2023    CHOL 137 12/11/2017    CO2 31 01/14/2023    CREATININE 1 69 (H) 01/14/2023    HDL 37 (L) 12/06/2022    HCT 27 3 (L) 01/13/2023    HGB 8 3 (L) 01/13/2023    HGBA1C 5 1 12/06/2022    MG 2 1 01/13/2023    PHOS 3 5 07/20/2022    PHOS 3 5 07/20/2022     01/13/2023    K 3 3 (L) 01/14/2023    PSA 0 1 12/06/2022     12/11/2017    TRIG 55 12/06/2022    WBC 6 87 01/13/2023     Note: for a comprehensive list of the patient's lab results, access the Results Review activity  Cardiac testing:   Results for orders placed during the hospital encounter of 20    Echo complete with contrast if indicated    Narrative  [address] [place]  [facility], Melissa Olm[place]  [phone]    Transthoracic Echocardiogram  2D, M-mode, Doppler, and Color Doppler    Study date:  14-Sep-2020    Patient: Wayne White  MR number: [id number]  Account number: [de-identified]  : 1940  Age: [de-identified] years  Gender: Male  Status: Inpatient  Location: Bedside  Height: 73 in  Weight: 298 3 lb  BP: 120/ 68 mmHg    Diagnoses: I48 0 - Atrial fibrillation    Sonographer:  ROGER Palmer  Primary Physician:  Abbie Cordova DO  Referring Physician:  Richelle Garcia MD  Group:  [facility]  Interpreting Physician:  Richelle Garcia MD    SUMMARY    LEFT VENTRICLE:  Systolic function was normal  Ejection fraction was estimated in the range of 55 % to 60 % to be 55 %  Although no diagnostic regional wall motion abnormality was identified, this possibility cannot be completely excluded on the basis of this study  Wall thickness was mildly to moderately increased  There was mild concentric hypertrophy  RIGHT VENTRICLE:  The ventricle was mildly dilated  LEFT ATRIUM:  The atrium was moderately to markedly dilated  RIGHT ATRIUM:  The atrium was mildly to moderately dilated  MITRAL VALVE:  There was moderate annular calcification  There was mild stenosis  Mean Gradient 6-7 mm of hg  There was mild regurgitation  AORTIC VALVE:  The valve was functionally bicuspid  Leaflets exhibited normal thickness and normal cuspal separation  Transaortic velocity was increased due to valvular stenosis  There was moderate to severe stenosis  Shima 1 0 cm2, peak gradient 65, mean 35 mm of hg    TRICUSPID VALVE:  There was mild to moderate regurgitation  Estimated peak PA pressure was 55 to 60 mmHg    The findings suggest moderate pulmonary hypertension  PULMONIC VALVE:  There was trace regurgitation  IVC, HEPATIC VEINS:  The inferior vena cava was mildly dilated  The respirophasic change in diameter was more than 50%  COMPARISONS:  Comparison was made with the previous study of 08-Apr-2019  Aortic stenosis has worsened  Pulmonary artery pressure has increased  HISTORY: PRIOR HISTORY: A  Flutter,A Fib ,chronic kidney disease,gout,heart failure,HTN,Pacemaker,pulmonary emphysema,sleep apnea,Angioplasty with stent placement,CAD,COPD,AAA  PROCEDURE: The procedure was performed at the bedside  This was a routine study  The transthoracic approach was used  The study included complete 2D imaging, M-mode, complete spectral Doppler, and color Doppler  The heart rate was 76 bpm,  at the start of the study  Images were obtained from the parasternal, apical, subcostal, and suprasternal notch acoustic windows  Intravenous contrast ( 0 4ml Definity in NSS) was administered to opacify the left ventricle and enhance  Doppler signals  Echocardiographic views were limited due to poor acoustic window availability, decreased penetration, and lung interference  This was a technically difficult study  LEFT VENTRICLE: Size was normal  Systolic function was normal  Ejection fraction was estimated in the range of 55 % to 60 % to be 55 %  Although no diagnostic regional wall motion abnormality was identified, this possibility cannot be  completely excluded on the basis of this study  Wall thickness was mildly to moderately increased  There was mild concentric hypertrophy  DOPPLER: The study was not technically sufficient to allow evaluation of LV diastolic function  RIGHT VENTRICLE: The ventricle was mildly dilated  Systolic function was normal     LEFT ATRIUM: The atrium was moderately to markedly dilated  RIGHT ATRIUM: The atrium was mildly to moderately dilated  A pacing wire was present  MITRAL VALVE: There was moderate annular calcification  There was mildly reduced leaflet separation  DOPPLER: Transmitral velocity was minimally increased  There was mild stenosis  Mean Gradient 6-7 mm of hg There was mild regurgitation  AORTIC VALVE: The valve was functionally bicuspid  Leaflets exhibited normal thickness and normal cuspal separation  DOPPLER: Transaortic velocity was increased due to valvular stenosis  There was moderate to severe stenosis  Shima 1 0 cm2,  peak gradient 65, mean 35 mm of hg There was no regurgitation  TRICUSPID VALVE: DOPPLER: There was mild to moderate regurgitation  Estimated peak PA pressure was 55 to 60 mmHg  The findings suggest moderate pulmonary hypertension  PULMONIC VALVE: DOPPLER: There was trace regurgitation  PERICARDIUM: There was no thickening or calcification  There was no pericardial effusion  AORTA: The root exhibited normal size  SYSTEMIC VEINS: IVC: The inferior vena cava was mildly dilated  The respirophasic change in diameter was more than 50%  SYSTEM MEASUREMENT TABLES    2D mode  AoR Diam 2D: 3 1 cm  LA Diam (2D): 5 3 cm  LA/Ao (2D): 1 71  FS (2D Teich): 25 7 %  IVSd (2D): 1 3 cm  LVDEV: 130 cmï¾³  LVESV: 64 7 cmï¾³  LVIDd(2D): 5 21 cm  LVISd (2D): 3 87 cm  LVOT Area 2D: 3 46 cmï¾²  LVPWd (2D): 1 33 cm  SV (Teich): 65 3 cmï¾³    Apical four chamber  LVEF A4C: 54 %    Unspecified Scan Mode  SHIMA Cont Eq (Peak Vitaly): 1 03 cmï¾²  LVOT Diam : 2 1 cm  LVOT Vmax: 1160 mm/s  LVOT Vmax; Mean: 1160 mm/s  Peak Grad ; Mean: 5 mm[Hg]  MV Peak A Vitaly: 434 mm/s  MV Peak E Vitaly  Mean: 1680 mm/s  MVA (PHT): 3 01 cmï¾²  PHT: 73 ms  Max P mm[Hg]  V Max: 2920 mm/s  Vmax: 3410 mm/s  TAPSE: 1 5 cm    [employer] Accredited Echocardi[facility]    Prepared and electronically signed by    Enoch Kumar MD  Signed 14-Sep-2020 13:14:06    No results found for this or any previous visit  No results found for this or any previous visit      No results found for this or any previous visit

## 2023-01-14 NOTE — ASSESSMENT & PLAN NOTE
Wt Readings from Last 3 Encounters:   01/12/23 112 kg (246 lb)   01/03/23 113 kg (249 lb)   12/28/22 118 kg (260 lb 2 3 oz)     · Recent cardiac echo with EF 60% and severe AS  · Continue diuretics with torsemide 40 mg daily   · Monitor volume status, currently appears euvolemic

## 2023-01-14 NOTE — ASSESSMENT & PLAN NOTE
On recent admission noted to have MSSA bacteremia  • Plan was for 6 weeks of IV antibiotic cefazolin through 1/17/2023, now discontinued   • Patient underwent TTE and MARY on prior admission  • Negative repeat blood cultures  • ID following,  • EP consulted for evaluation of possible PPM extraction, awaiting repeat CT head next week to evaluate for any improvement in brain lesions with IV abx alone   • Continue IV Naficillin 2 g Q4H

## 2023-01-14 NOTE — PLAN OF CARE
Problem: OCCUPATIONAL THERAPY ADULT  Goal: Performs self-care activities at highest level of function for planned discharge setting  See evaluation for individualized goals  Description: Treatment Interventions: ADL retraining, Functional transfer training, UE strengthening/ROM, Endurance training, Cognitive reorientation, Patient/family training, Compensatory technique education, Continued evaluation, Energy conservation, Activityengagement          See flowsheet documentation for full assessment, interventions and recommendations  1/14/2023 1253 by Martha Reese OT  Note: Limitation: Decreased ADL status, Decreased UE strength, Decreased Safe judgement during ADL, Decreased cognition, Decreased endurance, Decreased self-care trans, Decreased high-level ADLs  Prognosis: Fair  Assessment: Pt is a 80 y o  male seen for OT evaluation s/p admit to B on 1/12/2023 w/ Brain lesion  Per Neuro sx no plan for surgical intervention at this time  Pt presented to ED with c/o increased dizziness and SOB as well as having multiple falls and periods of confusion  Comorbidities affecting pt's functional performance at time of assessment include: Chronic A-fib, JOVI, Hyponatremia, CKD< Hypokaliemia, HTN, COPD, CHF, MSSA Bacteremia, MEtabolic encephalopathy, hyperglycemia  Personal factors affecting pt at time of IE include:steps to enter environment, difficulty performing ADLS, difficulty performing IADLS , limited insight into deficits, decreased initiation and engagement  and health management   Prior to admission, pt was I with ADLS and needed A with IADLS  Upon evaluation: Pt presents supine and is agreebale to OTIE  All vital signs WNL  Pt requires overall Mod A x 2, 2* the following deficits impacting occupational performance: weakness, decreased strength, decreased balance, decreased tolerance, impaired attention, impaired problem solving, decreased safety awareness and decreased coping skills   Pt resting in supine at end of session with all needs in reach, alarm on, all lines in place and SCD's on  Pt to benefit from continued skilled OT tx while in the hospital to address deficits as defined above and maximize level of functional independence w ADL's and functional mobility  Occupational Performance areas to address include: grooming, bathing/shower, toilet hygiene, dressing, health maintenance, functional mobility, community mobility, clothing management and social participation  The patient's raw score on the AM-PAC Daily Activity inpatient short form is 15  , standardized score is 34 69  , less than 39 4  Patients at this level are likely to benefit from discharge to post-acute rehabilitation services  Please refer to the recommendation of the Occupational Therapist for safe discharge planning  OT Discharge Recommendation: Post acute rehabilitation services       1/14/2023 1253 by Rony Pineda OT  Note: Limitation: Decreased ADL status, Decreased UE strength, Decreased Safe judgement during ADL, Decreased cognition, Decreased endurance, Decreased self-care trans, Decreased high-level ADLs  Prognosis: Fair  Assessment: Pt is a 80 y o  male seen for OT evaluation s/p admit to B on 1/12/2023 w/ Brain lesion  Per Neuro sx no plan for surgical intervention at this time  Pt presented to ED with c/o increased dizziness and SOB as well as having multiple falls and periods of confusion  Comorbidities affecting pt's functional performance at time of assessment include: Chronic A-fib, JOVI, Hyponatremia, CKD< Hypokaliemia, HTN, COPD, CHF, MSSA Bacteremia, MEtabolic encephalopathy, hyperglycemia  Personal factors affecting pt at time of IE include:steps to enter environment, difficulty performing ADLS, difficulty performing IADLS , limited insight into deficits, decreased initiation and engagement  and health management   Prior to admission, pt was I with ADLS and needed A with IADLS   Upon evaluation: Pt presents supine and is agreebale to OTIE  All vital signs WNL  Pt requires overall Mod A x 2, 2* the following deficits impacting occupational performance: weakness, decreased strength, decreased balance, decreased tolerance, impaired attention, impaired problem solving, decreased safety awareness and decreased coping skills  Pt resting in supine at end of session with all needs in reach, alarm on, all lines in place and SCD's on  Pt to benefit from continued skilled OT tx while in the hospital to address deficits as defined above and maximize level of functional independence w ADL's and functional mobility  Occupational Performance areas to address include: grooming, bathing/shower, toilet hygiene, dressing, health maintenance, functional mobility, community mobility, clothing management and social participation  The patient's raw score on the AM-PAC Daily Activity inpatient short form is 15  , standardized score is 34 69  , less than 39 4  Patients at this level are likely to benefit from discharge to post-acute rehabilitation services  Please refer to the recommendation of the Occupational Therapist for safe discharge planning       OT Discharge Recommendation: Post acute rehabilitation services

## 2023-01-14 NOTE — OCCUPATIONAL THERAPY NOTE
Occupational Therapy Evaluation     Patient Name: Odalys Zamora  WSIBB'Y Date: 1/14/2023  Problem List  Principal Problem:    Brain lesion  Active Problems:    Chronic a-fib (HCC)    JOVI (obstructive sleep apnea)    Hyponatremia    Stage 3a chronic kidney disease ([facility])    Hypokalemia    Essential hypertension    COPD (chronic obstructive pulmonary disease) ([facility])    Chronic diastolic heart failure ([facility])    MSSA bacteremia    Metabolic encephalopathy    Hyperglycemia    Past Medical History  Past Medical History:   Diagnosis Date    Arthritis     Atrial flutter ([facility])     Chronic kidney disease     stage 3    Coronary artery disease     2 stents    Fluid retention     Gout     Heart disease     Heart failure ([facility] [address] )     pacemaker    Hypertension     Hyponatremia 04/08/2019    Neurological disorder     Pacemaker     Pulmonary emphysema ([facility] [address] )     Radiculopathy     last assessed 1/28/16     Shortness of breath     exertion    Sleep apnea     c pap     Past Surgical History  Past Surgical History:   Procedure Laterality Date    ANGIOPLASTY      x2 2 stents and then replaced    CARDIAC PACEMAKER PLACEMENT      pacemaker permanent placement dual chamber / last assessed 4/7/14 / implantation     CARDIAC SURGERY      pacemaker    CHOLECYSTECTOMY      CORONARY ANGIOPLASTY WITH STENT PLACEMENT      EPIDURAL BLOCK INJECTION N/A 05/26/2016    Procedure: BLOCK / INJECTION EPIDURAL STEROID LUMBAR  L4-5;  Surgeon: Merari Couch MD;  [facility] MAIN OR;  Service:     EPIDURAL BLOCK INJECTION N/A 02/14/2019    Procedure: L4 L5 Lumbar Epidural Steroid Injection;  Surgeon: Merari Couch MD;  Location: [facility] MAIN OR;  Service: Pain Management     EYE SURGERY      cataract left    HAND SURGERY      HIP PINNING Left     INSERT / REPLACE / REMOVE PACEMAKER      IR TUNNELED CENTRAL LINE PLACEMENT  12/12/2022    KNEE ARTHROSCOPY W/ MENISCAL REPAIR Left     KNEE SURGERY      LUMBAR EPIDURAL INJECTION N/A 03/17/2016    Procedure: BLOCK / INJECTION LUMBAR  L4-5  (C-ARM); Surgeon: Ruperto Bowden MD;  Location: [facility] MAIN OR;  Service:     FL OPTX FEM SHFT FX W/INSJ IMED IMPLT W/WO SCREW Right 01/31/2022    Procedure: INSERTION NAIL IM FEMUR ANTEGRADE (TROCHANTERIC); Surgeon: Nora Tyson MD;  Location: [facility] Main OR;  Service: Orthopedics             01/14/23 1016   OT Last Visit   OT Visit Date 01/14/23   Note Type   Note type Evaluation   Pain Assessment   Pain Score No Pain   Restrictions/Precautions   Weight Bearing Precautions Per Order No   Other Precautions Bed Alarm;Multiple lines; Fall Risk;O2   Home Living   Type of [address] One level;Performs ADLs on one level;Stairs to enter with rails   Bathroom Shower/Tub Walk-in shower   Bathroom Toilet Standard   Bathroom Equipment Grab bars in shower; [address]; Wheelchair-manual;Hospital bed;Grab bars   Additional Comments Pt is a questionable historian information obtained via chart review  Pt lives in a one level home with 2 NATHEN was utilizing RW PTA   Prior Function   Level of [place] Independent with ADLs; Independent with functional mobility; Needs assistance with IADLS   Lives With Spouse; Family   Receives Help From Family   IADLs Family/Friend/Other provides transportation; Family/Friend/Other provides meals; Family/Friend/Other provides medication management   Falls in the last 6 months 1 to 4   Vocational Retired   [address], needs assist with IADLS ?  Driving   Reciprocal Relationships supportive wife and 2 DTRs   Service to Others retired   Intrinsic Gratification none stated   Subjective   Subjective "I'm tired"   ADL   Where Assessed Edge of bed   Grooming Assistance 5  Supervision/Setup   UB Bathing Assistance 3  Moderate Assistance   LB Bathing Assistance 2  Maximal Assistance   [address] 3  Moderate Assistance   LB Dressing Assistance 2  Maximal Assistance Toileting Assistance  2  Maximal Assistance   Bed Mobility   Supine to Sit 3  Moderate assistance   Additional items Assist x 2; Increased time required;LE management   Sit to Supine 3  Moderate assistance   Additional items Assist x 2; Increased time required;LE management   Transfers   Sit to Stand 3  Moderate assistance   Additional items Assist x 2; Increased time required;Verbal cues   Stand to Sit 3  Moderate assistance   Additional items Assist x 2; Increased time required;Verbal cues   Additional Comments HHA   Functional Mobility   Functional Mobility 3  Moderate assistance   Additional Comments Ax2 stepping toward [facility]   Additional items Hand hold assistance   Balance   Static Sitting Fair   Dynamic Sitting Fair -   Static Standing Poor   Dynamic Standing Poor -   Ambulatory Poor -   Activity Tolerance   Activity Tolerance Patient limited by fatigue   Medical Staff Made Aware DPT, NSG Aware   Nurse Made Aware yes   RUE Assessment   RUE Assessment WFL   LUE Assessment   LUE Assessment WFL   Psychosocial   Psychosocial (WDL) WDL   Cognition   Overall Cognitive Status Impaired   Arousal/Participation Responsive   Attention Difficulty attending to directions   Orientation Level Oriented to person;Disoriented to time;Disoriented to situation;Disoriented to place   Memory Decreased recall of recent events   Following Commands Follows one step commands with increased time or repetition   Comments Overall cooperative, slow to process, increased time for direction following as wel las increased cues, decreased insight noted   Assessment   Limitation Decreased ADL status; Decreased UE strength;Decreased Safe judgement during ADL;Decreased cognition;Decreased endurance;Decreased self-care trans;Decreased high-level ADLs   Prognosis Fair   Assessment Pt is a 80 y o  male seen for OT evaluation s/p admit to B on 1/12/2023 w/ Brain lesion  Per Neuro sx no plan for surgical intervention at this time   Pt presented to ED with c/o increased dizziness and SOB as well as having multiple falls and periods of confusion  Comorbidities affecting pt's functional performance at time of assessment include: Chronic A-fib, JOVI, Hyponatremia, CKD< Hypokaliemia, HTN, COPD, CHF, MSSA Bacteremia, MEtabolic encephalopathy, hyperglycemia  Personal factors affecting pt at time of IE include:steps to enter environment, difficulty performing ADLS, difficulty performing IADLS , limited insight into deficits, decreased initiation and engagement  and health management   Prior to admission, pt was I with ADLS and needed A with IADLS  Upon evaluation: Pt presents supine and is agreebale to OTIE  All vital signs WNL  Pt requires overall Mod A x 2, 2* the following deficits impacting occupational performance: weakness, decreased strength, decreased balance, decreased tolerance, impaired attention, impaired problem solving, decreased safety awareness and decreased coping skills  Pt resting in supine at end of session with all needs in reach, alarm on, all lines in place and SCD's on  Pt to benefit from continued skilled OT tx while in the hospital to address deficits as defined above and maximize level of functional independence w ADL's and functional mobility  Occupational Performance areas to address include: grooming, bathing/shower, toilet hygiene, dressing, health maintenance, functional mobility, community mobility, clothing management and social participation  The patient's raw score on the AM-PAC Daily Activity inpatient short form is 15  , standardized score is 34 69  , less than 39 4  Patients at this level are likely to benefit from discharge to post-acute rehabilitation services  Please refer to the recommendation of the Occupational Therapist for safe discharge planning  Goals   Patient Goals To lay down   Plan   Treatment Interventions ADL retraining;Functional transfer training;UE strengthening/ROM; Endurance training;Cognitive reorientation;Patient/family training; Compensatory technique education;Continued evaluation; Energy conservation; Activityengagement   Goal Expiration Date 01/28/23   OT Frequency 2-3x/wk   Recommendation   OT Discharge Recommendation Post acute rehabilitation services   AM-PAC Daily Activity Inpatient   Lower Body Dressing 2   Bathing 2   Toileting 2   Upper Body Dressing 2   Grooming 3   Eating 4   Daily Activity Raw Score 15   Daily Activity Standardized Score (Calc for Raw Score >=11) 34 69   AM-PAC Applied Cognition Inpatient   Following a Speech/Presentation 2   Understanding Ordinary Conversation 3   Taking Medications 2   Remembering Where Things Are Placed or Put Away 2   Remembering List of 4-5 Errands 2   Taking Care of Complicated Tasks 1   Applied Cognition Raw Score 12   Applied Cognition Standardized Score 28 82     OT goals to be addressed in the next 14 days:    Pt will complete rolling left/right in bed with S assist, as prerequisite for further engagement in ADLS     Pt will complete supine to sit transfer with S using B/L UEs to initiate bed mobility     Pt will tolerate sitting at EOB 20 minutes with S assist and stable vital signs, as prerequisite for further engagement in ADLS     Pt will complete UB ADLS with set up and use of AD/DME as needed to increase independence in functional tasks    Pt will complete LB ADLS with Min A and use of AD/DME as needed to increase independence in functional tasks    Pt will complete functional transfers with S assist on/off all ADL surfaces and DME PRN    Pt will follow 100% simple one step verbal commands and be A/Ox4 consistently t/o use of external environmental cues to increase awareness for functional tasks    Pt will improve functional mobility to S w/ DME as needed to increase engagement in meaningful tasks    Pt will be attentive 100% of the time during ongoing formal cognitive assessment to assist w/ safe D/C planning and increase safety for meaningful tasks

## 2023-01-14 NOTE — ASSESSMENT & PLAN NOTE
Patient presented with progressive confusion, generalized weakness, and decreased oral intake to [address] [place] [address]   • CT head showed peripherally enhancing lesion within the left parasagittal parietal lobe with surrounding vasogenic edema likely abscess given smooth peripheral rim enhancement development of ill defined enhancing lesion within right inferior paramedian cerebellar hemisphere with mild associated edema   • Unable to obtain MRI due to pacemaker which is not compatible per prior documentation   • ESR 56, CRP 20  • Transferred to [facility] for neurosurgical evaluation  • Neurosurgery following,  • Continue IV abx per ID recommendations as concern for probable brain abscesses   • No surgical intervention at this time, will need repeat CT head next week to evaluate for improvement on antibiotic regimen and re-evaluate need for surgery at that time   • Continue to hold Xarelto in the interim in case surgery is needed in the future during this admission    • ID consulted, currently on IV Nafcillin 2 g Q4H  • EP consulted for recommendations regarding possible pacer infection, extraction   • Repeat blood cultures neg x 24 and 72 hours, trend   • Repeat TTE results pending  • Continue with IV nafcillin

## 2023-01-14 NOTE — PROGRESS NOTES
[facility]  Progress Note - Miladys Hensley 1940, 80 y o  male MRN: [id number]  Unit/Bed#: [facility] 606-01 Encounter: [id number]  Primary Care Provider: Luciana Walker DO   Date and time admitted to hospital: 1/12/2023 11:49 PM      DOS: 1/14/2023  * Brain lesion  Assessment & Plan  Patient presented with progressive confusion, generalized weakness, and decreased oral intake to [address] [place] [address]   • CT head showed peripherally enhancing lesion within the left parasagittal parietal lobe with surrounding vasogenic edema likely abscess given smooth peripheral rim enhancement development of ill defined enhancing lesion within right inferior paramedian cerebellar hemisphere with mild associated edema   • Unable to obtain MRI due to pacemaker which is not compatible per prior documentation   • ESR 56, CRP 20  • Transferred to [facility] for neurosurgical evaluation  • Neurosurgery following,  • Continue IV abx per ID recommendations as concern for probable brain abscesses   • No surgical intervention at this time, will need repeat CT head next week to evaluate for improvement on antibiotic regimen and re-evaluate need for surgery at that time   • Continue to hold Xarelto in the interim in case surgery is needed in the future during this admission    • ID consulted, currently on IV Nafcillin 2 g Q4H  • EP consulted for recommendations regarding possible pacer infection, extraction   • Repeat blood cultures neg x 24 and 72 hours, trend   • Repeat TTE results pending  • Continue with IV nafcillin    Metabolic encephalopathy  Assessment & Plan  · Likely secondary to brain lesion as noted above  · Continue supportive care and IV antibiotic therapy   · Will need repeat CT head next week per neurosurgical recommendations, unable to obtain MRI brain due to non compatible pacemaker     Hyperglycemia  Assessment & Plan  · Noted on AM BMP  · HgbA1c 5 6%  · D/c further finger sticks  · Trend on morning blood work    MSSA bacteremia  Assessment & Plan  On recent admission noted to have MSSA bacteremia  • Plan was for 6 weeks of IV antibiotic cefazolin through 1/17/2023, now discontinued   • Patient underwent TTE and MARY on prior admission  • Negative repeat blood cultures  • ID following,  • EP consulted for evaluation of possible PPM extraction, awaiting repeat CT head next week to evaluate for any improvement in brain lesions with IV abx alone   • Continue IV Naficillin 2 g Q4H    Chronic diastolic heart failure (HCC)  Assessment & Plan  Wt Readings from Last 3 Encounters:   01/12/23 112 kg (246 lb)   01/03/23 113 kg (249 lb)   12/28/22 118 kg (260 lb 2 3 oz)     · Recent cardiac echo with EF 60% and severe AS  · Continue diuretics with torsemide 40 mg daily   · Monitor volume status, currently appears euvolemic         COPD (chronic obstructive pulmonary disease) ([facility] [address] )  Assessment & Plan  No exacerbation, stable on room air  • On trelegy, substituted with breo and incruse  • Continue scheduled nebulizer treatments      Essential hypertension  Assessment & Plan  · Acceptable BP on review   · Continue Torsemide 40 mg daily   · Monitor    Hypokalemia  Assessment & Plan  · K+ 3 3 today   · Replete with 60 mEq total PO KDUR  · Obtain repeat BMP in the AM  · Mag level previously WNL, check in AM as well     Stage 3a chronic kidney disease [facility])  Assessment & Plan  Lab Results   Component Value Date    EGFR 37 01/14/2023    EGFR 41 01/13/2023    EGFR 40 01/12/2023    CREATININE 1 69 (H) 01/14/2023    CREATININE 1 55 (H) 01/13/2023    CREATININE 1 58 (H) 01/12/2023     · Baseline creatinine 1 3-1 6  · Cr  1 69 today   · Trend BMP intermittently   · Continue home torsemide 40 mg daily for now    JOVI (obstructive sleep apnea)  Assessment & Plan  · Continue with CPAP nightly    Chronic a-fib (HCC)  Assessment & Plan  S/p pacemaker placement, not maintained on any rate controlling medications at this time  • Holding Xarelto for now per neurosurgery   • EP evaluating for possible PPM extraction pending clinical course as concern for PPM infection with recent MSSA bacteremia contributing to brain lesions/abscesses     Hyponatremia-resolved as of 2023  Assessment & Plan  • Resolved, likely in setting of volume depletion   • IVF have been discontinued, continue to monitor off fluid  • Continue on home torsemide 40 mg daily as BP allows   • Monitor BMP      VTE Pharmacologic Prophylaxis: VTE Score: 10 High Risk (Score >/= 5) - Pharmacological DVT Prophylaxis Contraindicated  Sequential Compression Devices Ordered  On hold per neurosurgery     Patient Centered Rounds: I evaluated the patient without nursing staff present due to speaking to nurse outside patient's room   Discussions with Specialists or Other Care Team Provider: Discussed with RN, CM and reviewed EP/ID notes     Education and Discussions with Family / Patient: Updated  (daughter) at bedside  Time Spent for Care: 30 minutes  More than 50% of total time spent on counseling and coordination of care as described above  Current Length of Stay: 2 day(s)  Current Patient Status: Inpatient   Certification Statement: The patient will continue to require additional inpatient hospital stay due to IV abx, repeat CT head next week   Discharge Plan: Anticipate discharge in >72 hrs to TBD pending clinical course    Code Status: Level 1 - Full Code    Subjective:   Pt with waxing and waning mentation, at times says things that do not make sense  He denies any pain at this time  No other complaints currently  Leg edema continues to look much improved  Objective:     Vitals:   Temp (24hrs), Av 3 °F (37 4 °C), Min:98 6 °F (37 °C), Max:100 °F (37 8 °C)    Temp:  [98 6 °F (37 °C)-100 °F (37 8 °C)] 98 6 °F (37 °C)  HR:  [80-91] 83  BP: (125-144)/(58-73) 125/59  SpO2:  [93 %-100 %] 99 %  Body mass index is 31 58 kg/m²       Input and Output Summary (last 24 hours): Intake/Output Summary (Last 24 hours) at 1/14/2023 1314  Last data filed at 1/14/2023 0814  Gross per 24 hour   Intake 1320 ml   Output --   Net 1320 ml       Physical Exam:   Physical Exam  Vitals reviewed  Constitutional:       General: He is not in acute distress  Comments: Pt is in no acute distress lying in his hospital bed resting comfortably  Disoriented    HENT:      Head: Normocephalic and atraumatic  Cardiovascular:      Rate and Rhythm: Normal rate and regular rhythm  Pulmonary:      Effort: Pulmonary effort is normal  No respiratory distress  Breath sounds: No wheezing  Abdominal:      General: There is no distension  Palpations: Abdomen is soft  Tenderness: There is no abdominal tenderness  Musculoskeletal:      Right lower leg: No edema  Left lower leg: No edema  Skin:     General: Skin is warm and dry  Neurological:      Mental Status: He is alert            Additional Data:     Labs:  Results from last 7 days   Lab Units 01/13/23  0605   WBC Thousand/uL 6 87   HEMOGLOBIN g/dL 8 3*   HEMATOCRIT % 27 3*   PLATELETS Thousands/uL 202   NEUTROS PCT % 79*   LYMPHS PCT % 12*   MONOS PCT % 7   EOS PCT % 1     Results from last 7 days   Lab Units 01/14/23  0549   SODIUM mmol/L 138   POTASSIUM mmol/L 3 3*   CHLORIDE mmol/L 100   CO2 mmol/L 31   BUN mg/dL 20   CREATININE mg/dL 1 69*   ANION GAP mmol/L 7   CALCIUM mg/dL 8 5   ALBUMIN g/dL 2 1*   TOTAL BILIRUBIN mg/dL 1 58*   ALK PHOS U/L 70   ALT U/L <6*   AST U/L 15   GLUCOSE RANDOM mg/dL 141*         Results from last 7 days   Lab Units 01/14/23  1135 01/14/23  0813   POC GLUCOSE mg/dl 158* 163*     Results from last 7 days   Lab Units 01/14/23  0552   HEMOGLOBIN A1C % 5 6     Results from last 7 days   Lab Units 01/10/23  2120   LACTIC ACID mmol/L 1 4       Lines/Drains:  Invasive Devices     Central Venous Catheter Line  Duration           CVC Central Lines 12/12/22 Single Right Subclavian 32 days          Drain  Duration           External Urinary Catheter Small 3 days                Central Line:  Goal for removal: Will discontinue when meds requiring line are completed  Imaging: No pertinent imaging reviewed  Recent Cultures (last 7 days):   Results from last 7 days   Lab Units 01/13/23  0105 01/10/23  2128 01/10/23  2120   BLOOD CULTURE  No Growth at 24 hrs  No Growth at 24 hrs  No Growth at 72 hrs  No Growth at 72 hrs  Last 24 Hours Medication List:   Current Facility-Administered Medications   Medication Dose Route Frequency Provider Last Rate   • acetaminophen  650 mg Oral Q6H PRN Bernett Degree, DO     • acetylcysteine  3 mL Nebulization TID Bernett Degree, DO     • allopurinol  200 mg Oral Daily Bernett Degree, DO     • docusate sodium  100 mg Oral BID Bernett Degree, DO     • DULoxetine  60 mg Oral BID Andersett Degree, DO     • finasteride  5 mg Oral Daily Bernett Degree, DO     • fluticasone-vilanterol  1 puff Inhalation Daily Bernett Degree, DO     • levalbuterol  1 25 mg Nebulization TID Andersett Degree, DO     • nafcillin  2,000 mg Intravenous Q4H Bernett Degree, DO 2,000 mg (01/14/23 1251)   • pantoprazole  40 mg Oral Early Morning Bernett Degree, DO     • polyethylene glycol  17 g Oral Daily Andersett Degree, DO     • potassium chloride  20 mEq Oral Once Zaki Duran PA-C     • potassium chloride  40 mEq Oral Once Zaki Duran PA-C     • saccharomyces boulardii  250 mg Oral BID Bernett Degree, DO     • senna  8 6 mg Oral HS Bernett Degree, DO     • sodium chloride (PF)  10 mL Intravenous [facility] Bernett Degree, DO     • torsemide  40 mg Oral Daily Andersett Degree, DO     • umeclidinium  1 puff Inhalation Daily Andersett Degree, DO          Today, Patient Was Seen By: Radha Henson PA-C    **Please Note: This note may have been constructed using a voice recognition system  **

## 2023-01-14 NOTE — PLAN OF CARE
Problem: PHYSICAL THERAPY ADULT  Goal: Performs mobility at highest level of function for planned discharge setting  See evaluation for individualized goals  Description: Treatment/Interventions: OT, Spoke to nursing, Bed mobility, Gait training, Patient/family training, Endurance training, LE strengthening/ROM, Functional transfer training          See flowsheet documentation for full assessment, interventions and recommendations  Note: Prognosis: Fair  Problem List: Decreased strength, Decreased range of motion, Decreased endurance, Impaired balance, Decreased mobility, Decreased cognition, Impaired judgement, Decreased safety awareness, Pain  Assessment: Pt is 80 y o  male seen for PT evaluation s/p admit to [facility] on 1/12/2023 w/ cerebral abscess, metabolic encephalopathy  PT consulted to assess pt's functional mobility and d/c needs  Order placed for PT eval and tx, w/ up w/ A order  Comorbidities affecting pt's physical performance at time of assessment include:  has a past medical history of Arthritis, Atrial flutter ([address] ), Chronic kidney disease, Coronary artery disease, Fluid retention, Gout, Heart disease, Heart failure ([address] ), Hypertension, Hyponatremia, Neurological disorder, Pacemaker, Pulmonary emphysema ([address] ), Radiculopathy, Shortness of breath, and Sleep apnea  PTA, pt was ambulates household distances, has 2 NATHEN and retired  Personal factors affecting pt at time of IE include: inaccessible home environment, stairs to enter home, inability to ambulate household distances, decreased cognition, limited home support, positive fall history, decreased initiation and engagement, unable to perform physical activity, limited insight into impairments, inability to perform IADLs and inability to perform ADLs   Please find objective findings from PT assessment regarding body systems outlined above with impairments and limitations including weakness, impaired balance, decreased endurance, gait deviations, pain, decreased activity tolerance, decreased functional mobility tolerance, decreased safety awareness, fall risk and decreased cognition  The following objective measures performed on IE also reveal limitations: The patient's AM-PAC Basic Mobility Inpatient Short Form Raw Score is 8   A standardized score less than 42 9 suggests the patient may benefit from discharge to post-acute rehabilitation services  Please also refer to the recommendation of the Physical Therapist for safe discharge planning  Pt's clinical presentation is currently unstable/unpredictable seen in pt's presentation of ongoing medical management  Pt to benefit from continued PT tx to address deficits as defined above and maximize level of functional independent mobility and consistency  From PT/mobility standpoint, recommendation at time of d/c would be post acute rehabilitation services pending progress in order to facilitate return to PLOF  PT Discharge Recommendation: Post acute rehabilitation services    See flowsheet documentation for full assessment

## 2023-01-14 NOTE — QUICK NOTE
QUICK NOTE - Deterioration Index  Gladys Santacruz 80 y o  male MRN: [id number]  Unit/Bed#: [facility] 606-01 Encounter: [id number]    Date Paged: 23  Time Paged: [address]  Room #: 212  Arrival Time: 1814  Deterioration index score at time of page: 65 21  %  Spoke with Magdalena Jones RN from primary team  Need to escalate level of care: no     PROBLEMS resulting in high DI score:   30% Pulse oximetry 43 %   21 Age 80years old    17% Supplemental oxygen Nasal cannula   14% Neurological exam Lethargic (Drowsy, easily aroused), Yes   6% Cardiac rhythm Atrial paced   3% Respiratory rate 18   4% Pulse 38         PLAN:    • Inaccurate pulse oximetry and pulse  Pulse 82 currently, O2 saturation in the 90s  No new issues  Please contact critical care via [employer] with any questions or concerns       Vitals:   Vitals:    23 1114 23 1233 23 1415 23 1521   BP: 125/59   122/61   Pulse: 83   82   Resp:    18   Temp: 98 6 °F (37 °C)   99 2 °F (37 3 °C)   SpO2: 99%  98% 97%   Height:  6' 2" (1 88 m)         Respiratory:  SpO2: SpO2: 97 %, SpO2 Activity: SpO2 Activity: At Rest, SpO2 Device: O2 Device: Nasal cannula  Nasal Cannula O2 Flow Rate (L/min): 2 L/min    Temperature: Temp (24hrs), Av 3 °F (37 4 °C), Min:98 6 °F (37 °C), Max:100 °F (37 8 °C)  Current: Temperature: 99 2 °F (37 3 °C)    Labs:   Results from last 7 days   Lab Units 23  1339 23  0605 23  0427 23  0522 01/10/23  2120   WBC Thousand/uL 8 05 6 87 7 91 6 77 6 53   HEMOGLOBIN g/dL 9 1* 8 3* 8 3* 8 1* 8 7*   HEMATOCRIT % 30 5* 27 3* 27 2* 26 1* 28 6*   PLATELETS Thousands/uL 195 202 190 180 190   NEUTROS PCT %  --  79*  --  78* 78*   MONOS PCT %  --  7  --  8 8     Results from last 7 days   Lab Units 23  0549 23  0605 23  0427 23  0522 01/10/23  2129   SODIUM mmol/L 138 136 134*   < > 130*   POTASSIUM mmol/L 3 3* 3 2* 4 0   < > 2 6*   CHLORIDE mmol/L 100 98 96   < > 89*   CO2 mmol/L 31 32 29   < > 31   BUN mg/dL 20 18 18   < > 19   CREATININE mg/dL 1 69* 1 55* 1 58*   < > 1 51*   CALCIUM mg/dL 8 5 8 5 8 5   < > 8 8   ALK PHOS U/L 70 68  --   --  77   ALT U/L <6* <6*  --   --  6*   AST U/L 15 16  --   --  21    < > = values in this interval not displayed       Results from last 7 days   Lab Units 01/13/23  0605 01/12/23  0427 01/11/23  0522   MAGNESIUM mg/dL 2 1 2 0 1 4*     Results from last 7 days   Lab Units 01/10/23  2120   LACTIC ACID mmol/L 1 4               Code Status: Level 1 - Full Code

## 2023-01-14 NOTE — SPEECH THERAPY NOTE
Speech-Language Pathology Bedside Swallow Evaluation    Patient Name: Estrella Snider    WNOSD'C Date: 1/14/2023     Problem List  Principal Problem:    Brain lesion  Active Problems:    Chronic a-fib (HCC)    JOVI (obstructive sleep apnea)    Hyponatremia    Stage 3a chronic kidney disease (HCC)    Hypokalemia    Essential hypertension    COPD (chronic obstructive pulmonary disease) (HCC)    Chronic diastolic heart failure (HCC)    MSSA bacteremia    Metabolic encephalopathy    Hyperglycemia    Past Medical History  Past Medical History:   Diagnosis Date   • Arthritis    • Atrial flutter (HCC)    • Chronic kidney disease     stage 3   • Coronary artery disease     2 stents   • Fluid retention    • Gout    • Heart disease    • Heart failure (HCC)     pacemaker   • Hypertension    • Hyponatremia 04/08/2019   • Neurological disorder    • Pacemaker    • Pul[address] emph[address]sema [address] [address] )    • Radiculopathy     last assessed 1/28/16    • Shortness of breath     exertion   • Sleep apnea     c pap     Past Surgical History  Past Surgical History:   Procedure Laterality Date   • ANGIOPLASTY      x2 2 stents and then replaced   • CARDIAC PACEMAKER PLACEMENT      pacemaker permanent placement dual chamber / last assessed 4/7/14 / implantation    • CARDIAC SURGERY      pacemaker   • CHOLECYSTECTOMY     • CORONARY ANGIOPLASTY WITH STENT PLACEMENT     • EPIDURAL BLOCK INJECTION N/A 05/26/2016    Procedure: BLOCK / INJECTION EPIDURAL STEROID LUMBAR  L4-5;  Surgeon: Camden Torrez MD;  Location: [facility] MAIN OR;  Service:    • EPIDURAL BLOCK INJECTION N/A 02/14/2019    Procedure: L4 L5 Lumbar Epidural Steroid Injection;  Surgeon: Camden Torrez MD;  Location: [facility] MAIN OR;  Service: Pain Management    • EYE SURGERY      cataract left   • HAND SURGERY     • HIP PINNING Left    • INSERT / REPLACE / REMOVE PACEMAKER     • IR TUNNELED CENTRAL LINE PLACEMENT  12/12/2022   • KNEE ARTHROSCOPY W/ MENISCAL REPAIR Left    • KNEE SURGERY     • LUMBAR EPIDURAL INJECTION N/A 03/17/2016    Procedure: BLOCK / INJECTION LUMBAR  L4-5  (C-ARM); Surgeon: Jeffry Johnson MD;  Location: [facility] MAIN OR;  Service:    • KS OPTX FEM SHFT FX W/INSJ IMED IMPLT W/WO SCREW Right 01/31/2022    Procedure: INSERTION NAIL IM FEMUR ANTEGRADE (TROCHANTERIC); Surgeon: Leslie Morfin MD;  Location: [facility] Main OR;  Service: Orthopedics       Summary  Pt presented with s/s suggestive of mild oropharyngeal dysphagia  Symptoms or concerns included disorganized mastication and bolus formation, suspected pharyngeal swallow delay and suspected decreased hyolaryngeal elevation upon palpation  No overt s/s aspiration occurred with the trials offered today  Pt was somewhat confused, attempting to get out of bed  Suspect impulsive intake however family assists with meals  Discussed diet textures with pt's daughter, agreeable to dysphagia 3 dental soft diet and thin liquids at this time  ST will f/u for potential to resume regular solids  Risk/s for Aspiration: mild    Recommended Diet: soft/level 3 diet and thin liquids   Recommended Form of Meds: whole with liquid   Aspiration precautions and swallowing strategies: upright posture, only feed when fully alert, slow rate of feeding, small bites/sips and alternating bites and sips  Other Recommendations: Continue frequent oral care      Current Medical Status per H&P 1/12/23  Elliott Medrano I 89 y  o  male with PPM, COPD, CHF, CKD, presented to ER in the evening of 110 due to worsening confusion noted by wife at home and multiple falls stated with decreased oral intake  Patient had recent prolonged hospitalization x2 in December 2022 at [facility] due to MSSA bacteremia with concern for possible pulmonary septic emboli on imaging   MARY was negative but could not definitively rule out small lesion due to calcifications   After discussion regarding pacemaker removal, the decision was made by family to pursue conservative approach with IV antibiotic  Milagros Rojas was discharged with PICC line and plan for IV cefazolin for 6-week course     Per family patient was initially doing better since discharge but over the last 5 to 7 days prior to admission he started to decline again  Initial noncontrast CT head showed 2 cm mass in posterior left paramedian parietal lobe with adjacent vasogenic edema   CT head with contrast performed  showed enhancing lesion in left parasagittal parietal lobe, as well as right inferior paramedian cerebellar hemisphere with associated edema  Evaluated by ID and neurosurgery, concern for brain abscess, antibiotic were changed to IV nafcillin and patient was transferred to [facility] for neurosurgery evaluation and possible biopsy  Discussed with patient's daughter at bedside, patient is more confused and disoriented today    Special Studies:  CXR 1/12/23    IMPRESSION:  CHEST:  1   Redemonstration of multiple bilateral pulmonary nodules as listed above  Some of the nodules have enlarged while others have decreased in size from most recent CT of 12/29/2022  The somewhat rapid changes in size of these nodules over recent exams   suggests an infectious/inflammatory etiology rather than malignancy  Continued follow-up is recommended to monitor for resolution  2   Mild interval improvement of moderate right pleural effusion with improved aeration of the right middle and right lower lobes  Patchy groundglass changes within the right lower lobe may represent residual atelectasis versus pneumonia  Trace left   pleural effusion has slightly worsened in the interval   3   Interval improvement of pericardial effusion  4   Persistent mediastinal adenopathy, likely reactive  Attention on follow-up is recommended    CT head 1/11/23 IMPRESSION:  Peripherally enhancing lesion within the left parasagittal parietal lobe with surrounding vasogenic edema, similar to the examination from the day before and new since 12/15/2022  Interval development of an ill-defined enhancing lesion within the right inferior paramedian cerebellar hemisphere with mild associated edema  Differential diagnosis for the constellation of findings again remains infection with abscess formation in the left parietal lobe versus metastatic disease  Patient reportedly has a nonconditional MRI pacemaker      Social/Education/Vocational Hx:  Pt lives with spouse and daughter    Swallow Information   Current Risks for Dysphagia & Aspiration: known history of dysphagia and AMS  Current Diet: mechanically altered/level 2 diet and thin liquids   Baseline Diet: regular diet and thin liquids      Baseline Assessment   Behavior/Cognition: alert and confused  Speech/Language Status: able to participate in basic conversation and able to follow commands  Patient Positioning: upright in bed  Pain Status/Interventions/Response to Interventions: No report of or nonverbal indications of pain  Swallow Mechanism Exam  Facial: symmetrical  Labial: WFL  Lingual: WFL  Velum: symmetrical  Mandible: adequate ROM  Dentition: adequate  Vocal quality:clear/adequate   Volitional Cough: strong/productive   Respiratory Status: on 2L O2    Consistencies Assessed and Performance   Consistencies Administered: thin liquids, puree and hard solids    Oral Stage: mild, decreased mastication and decreased bolus formation with solids    Pharyngeal Stage: mild  Swallow Mechanics: Swallowing initiation appeared mildly delayed  Laryngeal rise was palpated and judged to be reduced  No coughing, throat clearing, change in vocal quality or respiratory status noted today       Esophageal Concerns: hx GERD    Summary and Recommendations (see above)    Results Reviewed with: patient, RN, MD and family     Treatment Recommended: yes     Frequency of treatment: 1-2x/week    Dysphagia LTG  -Patient will demonstrate safe and effective oral intake (without overt s/s significant oral/pharyngeal dysphagia including s/s penetration or aspiration) for the highest appropriate diet level  Short Term Goals:  -Pt will tolerate Dysphagia 3/advanced (dental soft) diet and thin liquid with no significant s/s oral or pharyngeal dysphagia across 1-3 diagnostic sessions     -Patient will tolerate trials of upgraded food texture with no significant s/s of oral or pharyngeal dysphagia including aspiration across 1-3 diagnostic sessions

## 2023-01-14 NOTE — ASSESSMENT & PLAN NOTE
S/p pacemaker placement, not maintained on any rate controlling medications at this time  • Holding Xarelto for now per neurosurgery   • EP evaluating for possible PPM extraction pending clinical course as concern for PPM infection with recent MSSA bacteremia contributing to brain lesions/abscesses

## 2023-01-14 NOTE — PHYSICAL THERAPY NOTE
Physical Therapy Evaluation     Patient's Name: Gladys Santacruz    Admitting Diagnosis  Brain lesion [G93 9]    Problem List  Patient Active Problem List   Diagnosis    Chronic pain syndrome    Bilateral lumbar radiculopathy    Lumbar canal stenosis    Pain in both lower extremities    JOO (generalized anxiety disorder)    Benign hypertension with chronic kidney disease, stage III (HCC)    Persistent proteinuria    Benign prostatic hyperplasia    Allergic rhinitis    Aneurysm of abdominal aorta    Chronic a-fib (HCC)    Carpal tunnel syndrome    Chronic gout without tophus    Centrilobular emphysema (HCC)    Deep venous insufficiency    Difficulty in walking    Fecal soiling    Fibromyalgia    Hyperlipidemia    Moderate or severe vision impairment, one eye    Class 2 obesity due to excess calories without serious comorbidity with body mass index (BMI) of 36 0 to 36 9 in adult    Smoker    Pacemaker    JOVI (obstructive sleep apnea)    Urinary incontinence    Venous insufficiency (chronic) (peripheral)    Peripheral arteriosclerosis (HCC)    Serum total bilirubin elevated    Stage 3a chronic kidney disease (HCC)    CAD (coronary artery disease)    Status post primary angioplasty with coronary stent    Hypokalemia    Mixed simple and mucopurulent chronic bronchitis ([facility])    Hyperuricemia    Bilateral leg edema    Nephrolithiasis    Former smoker    Symptomatic anemia    Vitamin D insufficiency    Chronic constipation    Idiopathic hematuria    Kidney stone on left side    Flank pain    Obesity (BMI 30-39  9)    Dyspnea on exertion    Neuropathy    Coronary artery arteriosclerosis    Essential hypertension    Memory difficulty    COPD (chronic obstructive pulmonary disease) (HCC)    Chronic diastolic heart failure (HCC)    Falls    Closed fracture of right femur (HCC)    Valvular heart disease    Pressure injury of right buttock, unstageable ([facility])    Ambulatory dysfunction    Generalized weakness    Depression Dysphagia, pharyngoesophageal phase    Gastro-esophageal reflux disease without esophagitis    Generalized muscle weakness    History of falling    Iron deficiency anemia    Major depressive disorder, recurrent, unspecified ([facility])    Moderate to severe aortic stenosis    Other abnormalities of gait and mobility    Other specified polyneuropathies    Pulmonary hypertension due to left heart disease ([facility])    Presence of coronary angioplasty implant and graft    Unspecified hearing loss, unspecified ear    Primary osteoarthritis of both knees    Left hip pain    Cervical strain    Lump of skin of left lower extremity    Elevated troponin    Anemia    Depression with anxiety    MSSA bacteremia    Abnormal CAT scan    Influenza    Brain lesion    Metabolic encephalopathy    Hyperglycemia       Past Medical History  Past Medical History:   Diagnosis Date    Arthritis     Atrial flutter ([facility])     Chronic kidney disease     stage 3    Coronary artery disease     2 stents    Fluid retention     Gout     Heart disease     Heart failure ([facility] [address] )     pacemaker    Hypertension     Hyponatremia 04/08/2019    Neurological disorder     Pacemaker     Pulmonary emphysema ([facility] )     Radiculopathy     last assessed 1/28/16     Shortness of breath     exertion    Sleep apnea     c pap       Past Surgical History  Past Surgical History:   Procedure Laterality Date    ANGIOPLASTY      x2 2 stents and then replaced    CARDIAC PACEMAKER PLACEMENT      pacemaker permanent placement dual chamber / last assessed 4/7/14 / implantation     CARDIAC SURGERY      pacemaker    [address] INJECTION N/A 05/26/2016    Procedure: BLOCK / INJECTION EPIDURAL STEROID LUMBAR  L4-5;  Surgeon: Edgardo Moreno MD;  Location: [facility] MAIN OR;  Service:     EPIDURAL BLOCK INJECTION N/A 02/14/2019    Procedure: L4 L5 Lumbar Epidural Steroid Injection;  Surgeon: Edgardo Moreno MD;  Location: [facility] MAIN OR;  Service: Pain Management     EYE SURGERY      cataract left    HAND SURGERY      HIP PINNING Left     INSERT / REPLACE / REMOVE PACEMAKER      IR TUNNELED CENTRAL LINE PLACEMENT  12/12/2022    KNEE ARTHROSCOPY W/ MENISCAL REPAIR Left     KNEE SURGERY      LUMBAR EPIDURAL INJECTION N/A 03/17/2016    Procedure: BLOCK / INJECTION LUMBAR  L4-5  (C-ARM); Surgeon: Faisal Arredondo MD;  Location: [facility] MAIN OR;  Service:     MA OPTX FEM SHFT FX W/INSJ IMED IMPLT W/WO SCREW Right 01/31/2022    Procedure: INSERTION NAIL IM FEMUR ANTEGRADE (TROCHANTERIC); Surgeon: Kelechi Pierre MD;  Location: [facility] Main OR;  Service: Orthopedics            01/14/23 1017   PT Last Visit   PT Visit Date 01/14/23   Note Type   Note type Evaluation   Pain Assessment   Pain Assessment Tool 0-10   Pain Score No Pain   Restrictions/Precautions   Weight Bearing Precautions Per Order No   Other Precautions Chair Alarm; Bed Alarm;Multiple lines;Telemetry; Fall Risk;Pain   Home Living   Type of [address] One level;Performs ADLs on one level  (2 NATHEN)   Bathroom Shower/Tub Walk-in shower   [place]; Wheelchair-manual   Prior Function   Level of [place] Needs assistance with functional mobility   Lives With Spouse; Family  (2 daughters)   Receives Help From [facility] in the last 6 months 1 to 4   Vocational Retired   General   Family/Caregiver Present No   Cognition   Orientation Level Oriented to person   Subjective   Subjective Pt willing and agreeable to PT eval   RLE Assessment   RLE Assessment   (grossly 3/5 with movement)   LLE Assessment   LLE Assessment   (grossly 3/5 with movement)   Coordination   Movements are Fluid and Coordinated 0   Coordination and Movement Description slow and guarded with fatigue   Bed Mobility   Supine to Sit 3  Moderate assistance   Additional items Assist x 2   Sit to Supine 3  Moderate assistance   Additional items Assist x 2   Transfers   Sit to Stand 3  Moderate assistance   Additional items Assist x 2   Stand to Sit 3  Moderate assistance   Additional items Assist x 2   Ambulation/Elevation   Gait pattern Shuffling;Decreased foot clearance; Excessively slow; Improper Weight shift   Gait Assistance 2  Maximal assist   Additional items Assist x 2   Assistive Device Other (Comment)  (HHA)   Distance 1'   Balance   Static Sitting Fair   Dynamic Sitting Fair -   Static Standing Zero   Ambulatory Zero   Endurance Deficit   Endurance Deficit Yes   Endurance Deficit Description limited by fatigue, weakness, decreased balance  Activity Tolerance   Activity Tolerance Patient limited by fatigue;Patient limited by pain   Medical Staff Made Aware OT   Nurse Made Aware yes   Assessment   Prognosis Fair   Problem List Decreased strength;Decreased range of motion;Decreased endurance; Impaired balance;Decreased mobility; Decreased cognition; Impaired judgement;Decreased safety awareness;Pain   Assessment Pt is 80 y o  male seen for PT evaluation s/p admit to [facility] on 1/12/2023 w/ cerebral abscess, metabolic encephalopathy  PT consulted to assess pt's functional mobility and d/c needs  Order placed for PT eval and tx, w/ up w/ A order  Comorbidities affecting pt's physical performance at time of assessment include:  has a past medical history of Arthritis, Atrial flutter ([address] ), Chronic kidney disease, Coronary artery disease, Fluid retention, Gout, Heart disease, Heart failure ([address] ), Hypertension, Hyponatremia, Neurological disorder, Pacemaker, Pulmonary emphysema ([address] ), Radiculopathy, Shortness of breath, and Sleep apnea  PTA, pt was ambulates household distances, has 2 NATHEN and retired   Personal factors affecting pt at time of IE include: inaccessible home environment, stairs to enter home, inability to ambulate household distances, decreased cognition, limited home support, positive fall history, decreased initiation and engagement, unable to perform physical activity, limited insight into impairments, inability to perform IADLs and inability to perform ADLs  Please find objective findings from PT assessment regarding body systems outlined above with impairments and limitations including weakness, impaired balance, decreased endurance, gait deviations, pain, decreased activity tolerance, decreased functional mobility tolerance, decreased safety awareness, fall risk and decreased cognition  The following objective measures performed on IE also reveal limitations: The patient's AM-PAC Basic Mobility Inpatient Short Form Raw Score is 8   A standardized score less than 42 9 suggests the patient may benefit from discharge to post-acute rehabilitation services  Please also refer to the recommendation of the Physical Therapist for safe discharge planning  Pt's clinical presentation is currently unstable/unpredictable seen in pt's presentation of ongoing medical management  Pt to benefit from continued PT tx to address deficits as defined above and maximize level of functional independent mobility and consistency  From PT/mobility standpoint, recommendation at time of d/c would be post acute rehabilitation services pending progress in order to facilitate return to PLOF  Goals   Patient Goals To get stronger   STG Expiration Date 01/26/23   Short Term Goal #1 1  Complete bed mobility and transfers I to decrease need for caregiver in home  2  Complete 300' of W/C mobility I to complete household and community mobility without A  3  Improve dynamic balance to good to decrease need for UE support during ambulation  4  Be educated & demonstate 2 steps to be able to enter home without A  5  Ambulate 25' to complete short distance household mobility with S to decrease caregiver burden  Plan   Treatment/Interventions OT; Spoke to nursing;Bed mobility;Gait training;Patient/family training; Endurance training;LE strengthening/ROM; Functional transfer training   PT Frequency 3-5x/wk Recommendation   PT Discharge Recommendation Post acute rehabilitation services   AM-PAC Basic Mobility Inpatient   Turning in Flat Bed Without Bedrails 2   Lying on Back to Sitting on Edge of Flat Bed Without Bedrails 2   Moving Bed to Chair 1   Standing Up From Chair Using Arms 1   Walk in Room 1   Climb 3-5 Stairs With Railing 1   Basic Mobility Inpatient Raw Score 8   Turning Head Towards Sound 3   Follow Simple Instructions 3   Low Function Basic Mobility Raw Score 14   Low Function Basic Mobility Standardized Score 22 01   Highest Level Of Mobility   JH-HLM Goal 3: Sit at edge of bed   JH-HLM Achieved 4: Move to chair/commode       Jen Lundberg, VIMAL

## 2023-01-15 NOTE — ASSESSMENT & PLAN NOTE
Patient presented with progressive confusion, generalized weakness, and decreased oral intake to [address] [place] [address]   • CT head showed peripherally enhancing lesion within the left parasagittal parietal lobe with surrounding vasogenic edema likely abscess given smooth peripheral rim enhancement development of ill defined enhancing lesion within right inferior paramedian cerebellar hemisphere with mild associated edema   • Unable to obtain MRI due to pacemaker which is not compatible per prior documentation   • ESR 56, CRP 20  • Transferred to [facility] for neurosurgical evaluation  • Neurosurgery following,  • Continue IV abx per ID recommendations as concern for probable brain abscesses   • No surgical intervention at this time, will need repeat CT head next week to evaluate for improvement on antibiotic regimen and re-evaluate need for surgery at that time   • Continue to hold Xarelto in the interim in case surgery is needed in the future during this admission    • ID consulted, currently on IV Nafcillin 2 g Q4H  • EP consulted for recommendations regarding possible pacer infection, extraction   • Repeat blood cultures neg x 24 and 72 hours, trend   • Repeat TTE results pending  • Continue with IV nafcillin

## 2023-01-15 NOTE — DISCHARGE INSTR - OTHER ORDERS
Plan:   Skin care Plan:    1-Cleanse sacro-buttocks with soap and water  Apply Allevyn foam or equivalent and tomasz with T for treatment  Change every two days and as  needed  check skin q-shift  2-Cleanse skin tears to bilateral arms and wound to left dorsal foot with wound cleanser & pat dry  Open small Dermagran square and apply to open areas in a single layer cut to size of wounds  Cover with ABD or gauze, kia & tape  Change every other day & as needed for soilage/dislodgement  3-Turn/reposition every 2 hrs or when medically stable for pressure re-distribution on skin   4-Float heels on 2 pillows so heels are not in contact with mattress or pillows to offload pressure  Consider heel protector boots if patient unable to maintain heels floated on pillows  5-Moisturize skin daily with skin nourishing cream  6-Pressure redistribution cushion in chair when out of bed  7-Hydraguard to bilateral heels 2x/day and as needed

## 2023-01-15 NOTE — PROGRESS NOTES
[facility]  Progress Note - Anastasiia Mcdaniels 1940, 80 y o  male MRN: [id number]  Unit/Bed#: [facility] 606-01 Encounter: [id number]  Primary Care Provider: Bayron Patel DO   Date and time admitted to hospital: 1/12/2023 11:49 PM    Hyperglycemia  Assessment & Plan  · Noted on AM BMP  · HgbA1c 5 6%  · D/c further finger sticks  · Trend on morning blood work    Metabolic encephalopathy  Assessment & Plan  · Likely secondary to brain lesion as noted above  · Continue supportive care and IV antibiotic therapy   · Will need repeat CT head next week per neurosurgical recommendations, unable to obtain MRI brain due to non compatible pacemaker     MSSA bacteremia  Assessment & Plan  On recent admission noted to have MSSA bacteremia  • Plan was for 6 weeks of IV antibiotic cefazolin through 1/17/2023, now discontinued   • Patient underwent TTE and MARY on prior admission  • Negative repeat blood cultures  • ID following,  • EP consulted for evaluation of possible PPM extraction, awaiting repeat CT head next week to evaluate for any improvement in brain lesions with IV abx alone   • Continue IV Naficillin 2 g Q4H    Chronic diastolic heart failure (HCC)  Assessment & Plan  Wt Readings from Last 3 Encounters:   01/12/23 112 kg (246 lb)   01/03/23 113 kg (249 lb)   12/28/22 118 kg (260 lb 2 3 oz)     · Recent cardiac echo with EF 60% and severe AS  · Continue diuretics with torsemide 40 mg daily   · Monitor volume status, currently appears euvolemic         COPD (chronic obstructive pulmonary disease) (HCC)  Assessment & Plan  No exacerbation, stable on room air  • On trelegy, substituted with breo and incruse  • Continue scheduled nebulizer treatments      Essential hypertension  Assessment & Plan  · Acceptable BP on review   · Continue Torsemide 40 mg daily   · Monitor    Hypokalemia  Assessment & Plan  · K+ 3 6 today   · Serial BMP monitoring     Stage 3a chronic kidney disease [facility])  Assessment & Plan  Lab Results   Component Value Date    EGFR 28 01/15/2023    EGFR 37 01/14/2023    EGFR 41 01/13/2023    CREATININE 2 07 (H) 01/15/2023    CREATININE 1 69 (H) 01/14/2023    CREATININE 1 55 (H) 01/13/2023     · Baseline creatinine 1 3-1 6  · Cr  1 69 today   · Trend BMP intermittently   · Continue home torsemide 40 mg daily for now    JOVI (obstructive sleep apnea)  Assessment & Plan  · Continue with CPAP nightly    Chronic a-fib ([address] )  Assessment & Plan  S/p pacemaker placement, not maintained on any rate controlling medications at this time  • Holding Xarelto for now per neurosurgery   • EP evaluating for possible PPM extraction pending clinical course as concern for PPM infection with recent MSSA bacteremia contributing to brain lesions/abscesses     * Brain lesion  Assessment & Plan  Patient presented with progressive confusion, generalized weakness, and decreased oral intake to [address] [place] [address] Avenue   • CT head showed peripherally enhancing lesion within the left parasagittal parietal lobe with surrounding vasogenic edema likely abscess given smooth peripheral rim enhancement development of ill defined enhancing lesion within right inferior paramedian cerebellar hemisphere with mild associated edema   • Unable to obtain MRI due to pacemaker which is not compatible per prior documentation   • ESR 56, CRP 20  • Transferred to [facility] for neurosurgical evaluation  • Neurosurgery following,  • Continue IV abx per ID recommendations as concern for probable brain abscesses   • No surgical intervention at this time, will need repeat CT head next week to evaluate for improvement on antibiotic regimen and re-evaluate need for surgery at that time   • Continue to hold Xarelto in the interim in case surgery is needed in the future during this admission    • ID consulted, currently on IV Nafcillin 2 g Q4H  • EP consulted for recommendations regarding possible pacer infection, extraction   • Repeat blood cultures neg x 24 and 72 hours, trend   • Repeat TTE results pending  • Continue with IV nafcillin        VTE Pharmacologic Prophylaxis: VTE Score: 10 High Risk (Score >/= 5) - Pharmacological DVT Prophylaxis Contraindicated  Sequential Compression Devices Ordered  On hold per neurosurgery    Patient Centered Rounds: I performed bedside rounds with nursing staff today  Discussions with Specialists or Other Care Team Provider: N/A    Education and Discussions with Family / Patient: Attempted to update  (wife) via phone  Unable to contact  Time Spent for Care: 45 minutes  More than 50% of total time spent on counseling and coordination of care as described above  Current Length of Stay: 3 day(s)  Current Patient Status: Inpatient   Certification Statement: The patient will continue to require additional inpatient hospital stay due to Neurosurgery Evaluation  Discharge Plan: Anticipate discharge in 48-72 hrs to discharge location to be determined pending rehab evaluations  Code Status: Level 1 - Full Code    Subjective:   Seen and examined at bedside this morning  Nursing was concerned that patient appeared to sound congested, but patient states he feels well  Tolerating 2-3L O2 with no other complaints    Objective:     Vitals:   Temp (24hrs), Av °F (37 2 °C), Min:98 °F (36 7 °C), Max:99 7 °F (37 6 °C)    Temp:  [98 °F (36 7 °C)-99 7 °F (37 6 °C)] 99 2 °F (37 3 °C)  HR:  [78-84] 84  Resp:  [18-19] 18  BP: (128-129)/(68-75) 129/68  SpO2:  [93 %-99 %] 94 %  Body mass index is 31 58 kg/m²  Input and Output Summary (last 24 hours): Intake/Output Summary (Last 24 hours) at 1/15/2023 1611  Last data filed at 1/15/2023 0758  Gross per 24 hour   Intake 780 ml   Output 1025 ml   Net -245 ml       Physical Exam:   Physical Exam  Vitals reviewed  Constitutional:       General: He is not in acute distress  HENT:      Head: Normocephalic and atraumatic        Mouth/Throat:      Mouth: Mucous membranes are moist    Eyes:      General: No scleral icterus  Cardiovascular:      Rate and Rhythm: Normal rate and regular rhythm  Pulses: Normal pulses  Heart sounds: No murmur heard  Pulmonary:      Effort: Pulmonary effort is normal  No respiratory distress  Breath sounds: No wheezing  Abdominal:      General: There is no distension  Palpations: Abdomen is soft  Tenderness: There is no abdominal tenderness  Musculoskeletal:      Right lower leg: No edema  Left lower leg: No edema  Skin:     General: Skin is warm and dry  Capillary Refill: Capillary refill takes less than 2 seconds  Neurological:      General: No focal deficit present  Mental Status: He is alert and oriented to person, place, and time  Sensory: No sensory deficit  Psychiatric:         Mood and Affect: Mood normal          Behavior: Behavior normal           Additional Data:     Labs:  Results from last 7 days   Lab Units 01/15/23  0557 01/14/23  1339 01/13/23  0605   WBC Thousand/uL 8 97   < > 6 87   HEMOGLOBIN g/dL 8 4*   < > 8 3*   HEMATOCRIT % 29 2*   < > 27 3*   PLATELETS Thousands/uL 201   < > 202   NEUTROS PCT %  --   --  79*   LYMPHS PCT %  --   --  12*   MONOS PCT %  --   --  7   EOS PCT %  --   --  1    < > = values in this interval not displayed       Results from last 7 days   Lab Units 01/15/23  0557   SODIUM mmol/L 135   POTASSIUM mmol/L 3 6   CHLORIDE mmol/L 97   CO2 mmol/L 31   BUN mg/dL 26*   CREATININE mg/dL 2 07*   ANION GAP mmol/L 7   CALCIUM mg/dL 8 4   ALBUMIN g/dL 2 0*   TOTAL BILIRUBIN mg/dL 1 68*   ALK PHOS U/L 91   ALT U/L <6*   AST U/L 17   GLUCOSE RANDOM mg/dL 159*         Results from last 7 days   Lab Units 01/14/23  1135 01/14/23  0813   POC GLUCOSE mg/dl 158* 163*     Results from last 7 days   Lab Units 01/14/23  0552   HEMOGLOBIN A1C % 5 6     Results from last 7 days   Lab Units 01/10/23  2120   LACTIC ACID mmol/L 1 4       Lines/Drains:  Invasive Devices     Central Venous Catheter Line  Duration           CVC Central Lines 12/12/22 Single Right Subclavian 33 days          Drain  Duration           External Urinary Catheter Small 4 days                Central Line:  Goal for removal: Will discontinue when meds requiring line are completed  Imaging: Reviewed radiology reports from this admission including: CT head    Recent Cultures (last 7 days):   Results from last 7 days   Lab Units 01/13/23  0105 01/10/23  2128 01/10/23  2120   BLOOD CULTURE  No Growth at 48 hrs  No Growth at 48 hrs  No Growth After 4 Days  No Growth After 4 Days  Last 24 Hours Medication List:   Current Facility-Administered Medications   Medication Dose Route Frequency Provider Last Rate   • acetaminophen  650 mg Oral Q6H PRN Lucrecia Carr, DO     • acetylcysteine  3 mL Nebulization TID Lucrecia Carr, DO     • allopurinol  200 mg Oral Daily Lucrecia Carr, DO     • docusate sodium  100 mg Oral BID Lucrecia Carr, DO     • DULoxetine  60 mg Oral BID Lucrecia Carr, DO     • finasteride  5 mg Oral Daily Lucrecia Carr, DO     • fluticasone-vilanterol  1 puff Inhalation Daily Lucrecia Carr, DO     • levalbuterol  1 25 mg Nebulization TID Lucrecia Carr, DO     • nafcillin  2,000 mg Intravenous Q4H Lucrecia Carr, DO 2,000 mg (01/15/23 1300)   • pantoprazole  40 mg Oral Early Morning Lucrecia Carr, DO     • polyethylene glycol  17 g Oral Daily Lucrecia Carr, DO     • saccharomyces boulardii  250 mg Oral BID Lucrecia Carr, DO     • senna  8 6 mg Oral HS Lucrecia Carr, DO     • sodium chloride (PF)  10 mL Intravenous [facility] Lucrecia Carr, DO     • torsemide  40 mg Oral Daily Lucrecia Carr, DO     • umeclidinium  1 puff Inhalation Daily Lucrecia Carr, DO          Today, Patient Was Seen By: Debbe Bosworth    **Please Note: This note may have been constructed using a voice recognition system  **

## 2023-01-15 NOTE — ASSESSMENT & PLAN NOTE
Lab Results   Component Value Date    EGFR 28 01/15/2023    EGFR 37 01/14/2023    EGFR 41 01/13/2023    CREATININE 2 07 (H) 01/15/2023    CREATININE 1 69 (H) 01/14/2023    CREATININE 1 55 (H) 01/13/2023     · Baseline creatinine 1 3-1 6  · Cr  1 69 today   · Trend BMP intermittently   · Continue home torsemide 40 mg daily for now

## 2023-01-16 NOTE — CONSULTS
Consultation - Palliative and Supportive Care   Walker García 80 y o  male [id number]    Patient Active Problem List   Diagnosis   • Chronic pain syndrome   • Bilateral lumbar radiculopathy   • Lumbar canal stenosis   • Pain in both lower extremities   • JOO (generalized anxiety disorder)   • Benign hypertension with chronic kidney disease, stage III ([facility])   • Persistent proteinuria   • Benign prostatic hyperplasia   • Allergic rhinitis   • Aneurysm of abdominal aorta   • Chronic a-fib ([facility])   • Carpal tunnel syndrome   • Chronic gout without tophus   • Centrilobular emphysema ([facility])   • Deep venous insufficiency   • Difficulty in walking   • Fecal soiling   • Fibromyalgia   • Hyperlipidemia   • Moderate or severe vision impairment, one eye   • Class 2 obesity due to excess calories without serious comorbidity with body mass index (BMI) of 36 0 to 36 9 in adult   • Smoker   • Pacemaker   • JOVI (obstructive sleep apnea)   • Urinary incontinence   • Venous insufficiency (chronic) (peripheral)   • Peripheral arteriosclerosis ([facility])   • Serum total bilirubin elevated   • Stage 3a chronic kidney disease ([facility])   • CAD (coronary artery disease)   • Status post primary angioplasty with coronary stent   • Hypokalemia   • Mixed simple and mucopurulent chronic bronchitis ([facility])   • Hyperuricemia   • Bilateral leg edema   • Nephrolithiasis   • Former smoker   • Symptomatic anemia   • Vitamin D insufficiency   • Chronic constipation   • Idiopathic hematuria   • Kidney stone on left side   • Flank pain   • Obesity (BMI 30-39  9)   • Dyspnea on exertion   • Neuropathy   • Coronary artery arteriosclerosis   • Essential hypertension   • Memory difficulty   • COPD (chronic obstructive pulmonary disease) ([facility])   • Chronic diastolic heart failure ([facility])   • Falls   • Closed fracture of right femur ([facility])   • Valvular heart disease   • Pressure injury of right buttock, unstageable ([facility])   • Ambulatory dysfunction   • Generalized weakness • Depression   • Dysphagia, pharyngoesophageal phase   • Gastro-esophageal reflux disease without esophagitis   • Generalized muscle weakness   • History of falling   • Iron deficiency anemia   • Major depressive disorder, recurrent, unspecified (HCC)   • Moderate to severe aortic stenosis   • Other abnormalities of gait and mobility   • Other specified polyneuropathies   • Pulmonary hypertension due to left heart disease (HCC)   • Presence of coronary angioplasty implant and graft   • Unspecified hearing loss, unspecified ear   • Primary osteoarthritis of both knees   • Left hip pain   • Cervical strain   • Lump of skin of left lower extremity   • Elevated troponin   • Anemia   • Depression with anxiety   • MSSA bacteremia   • Abnormal CAT scan   • Influenza   • Brain lesion   • Metabolic encephalopathy   • Hyperglycemia     Active issues specifically addressed today include: bacteremia, encephalopathy, recurrent admissions, brain lesions    Plan:  1  Symptom management -    - will defer to primary team    2  Goals - treatment focused at this time, with limitations        Code Status: full - Level 1   Decisional apparatus:  Patient is not competent on my exam today  If competence is lost, patient's substitute decision maker would default to wife by [place]  Advance Directive / Living Will / POLST:  As documented     I have reviewed the patient's controlled substance dispensing history in the Prescription Drug Monitoring Program in compliance with the [facility] regulations before prescribing any controlled substances  We appreciate the invitation to be involved in this patient's care  We will continue to follow  Please do not hesitate to reach our on call provider through our clinic answering service at  should you have acute symptom control concerns  Magdaleno Moctezuma MD  Palliative and Supportive Care  Clinic/Answering Service: [phone]  You can find me on [employer]! IDENTIFICATION:  Inpatient consult to Palliative Care  Consult performed by: Jesus Cordoba MD  Consult ordered by: Penny Mcdonald        Physician Requesting Consult: Penny Mcdonald  Reason for Consult / Principal Problem: GOC  Hx and PE limited by: AMS    HISTORY OF PRESENT ILLNESS:       Odalys Zamora is a 80 y o  male with PMH of CAD, A-fib, HTN, CKD who presented with AMS and weakness s/p multiple falls at home and found to have MSSA bacteremia with brain lesions that infectious disease believs to be abscesses  Pt is unable to have MRI 2/2 pacemaker and ID recommended EP consult for possible pacemaker removal  EP recommended [facility] consult  On my encounter today, the patient is lying in bed and in and out of consciousness, he is a poor historian  His daughters and wife are at bedside and they explain that he was fully functional and quite independent until early December  For the last 6 wks he has been in and out of the hospital and rehab centers, and has lost a lot of strength  His consciousness has been fluctuating this admission and at times he does not recognize his family  His daughters are reluctant to agree to any invasive procedures, but hope that this course of Abx will help him improve and would like to give it a try  If this does not work, they would be willing to consider a transition to comfort care  We agree that I will keep checking on them and if a family meeting ends up being necessary we can always do that but for now it is a waiting game      Review of Systems   Unable to perform ROS: mental status change       Past Medical History:   Diagnosis Date   • Arthritis    • Atrial flutter ([address] )    • Chronic kidney disease     stage 3   • Coronary artery disease     2 stents   • Fluid retention    • Gout    • Heart disease    • Heart failure [facility])     pacemaker   • Hypertension    • Hyponatremia 04/08/2019   • Neurological disorder    • Pacemaker    • Pulmonary emphysema [facility])    • Radiculopathy     last assessed 1/28/16    • Shortness of breath     exertion   • Sleep apnea     c pap     Past Surgical History:   Procedure Laterality Date   • ANGIOPLASTY      x2 2 stents and then replaced   • CARDIAC PACEMAKER PLACEMENT      pacemaker permanent placement dual chamber / last assessed 4/7/14 / implantation    • CARDIAC SURGERY      pacemaker   • CHOLECYSTECTOMY     • CORONARY ANGIOPLASTY WITH STENT PLACEMENT     • EPIDURAL BLOCK INJECTION N/A 05/26/2016    Procedure: BLOCK / INJECTION EPIDURAL STEROID LUMBAR  L4-5;  Surgeon: Klever Jacobsen MD;  Location: [place] [address] MAIN OR;  Service:    • EPIDURAL BLOCK INJECTION N/A 02/14/2019    Procedure: L4 L5 Lumbar Epidural Steroid Injection;  Surgeon: Klever Jacobsen MD;  Location: [place] [address] MAIN OR;  Service: Pain Management    • EYE SURGERY      cataract left   • HAND SURGERY     • HIP PINNING Left    • INSERT / REPLACE / REMOVE PACEMAKER     • IR TUNNELED CENTRAL LINE PLACEMENT  12/12/2022   • KNEE ARTHROSCOPY W/ MENISCAL REPAIR Left    • KNEE SURGERY     • LUMBAR EPIDURAL INJECTION N/A 03/17/2016    Procedure: BLOCK / INJECTION LUMBAR  L4-5  (C-ARM); Surgeon: Klever Jacobsen MD;  Location: [facility] MAIN OR;  Service:    • IL OPTX FEM SHFT FX W/INSJ IMED IMPLT W/WO SCREW Right 01/31/2022    Procedure: INSERTION NAIL IM FEMUR ANTEGRADE (TROCHANTERIC);   Surgeon: Kun Candelaria MD;  Location:  Main OR;  Service: Orthopedics     Social History     Socioeconomic History   • Marital status: /Civil Union     Spouse name: Not on file   • Number of children: Not on file   • Years of education: Not on file   • Highest education level: Not on file   Occupational History   • Occupation: RETIRED   Tobacco Use   • Smoking status: Every Day     Packs/day: 0 25     Years: 50 00     Pack years: 12 50     Types: Cigarettes     Start date: 1/1/2022   • Smokeless tobacco: Former   • Tobacco comments:     quit 02/14/2021   Vaping Use   • Vaping Use: Never used Substance and Sexual Activity   • Alcohol use: Never   • Drug use: No   • Sexual activity: Yes     Partners: Female     Birth control/protection: None   Other Topics Concern   • Not on file   Social History Narrative    Daily tea consumption 10 cups day      Social Determinants of Health     Financial Resource Strain: Not on file   Food Insecurity: No Food Insecurity   • Worried About Running Out of Food in the Last Year: Never true   • Ran Out of Food in the Last Year: Never true   Transportation Needs: No Transportation Needs   • Lack of Transportation (Medical): No   • Lack of Transportation (Non-Medical): No   Physical Activity: Not on file   Stress: Not on file   Social Connections: Not on file   Intimate Partner Violence: Not on file   Housing Stability: Low Risk    • Unable to Pay for Housing in the Last Year: No   • Number of Places Lived in the Last Year: 1   • Unstable Housing in the Last Year: No     Family History   Problem Relation Age of Onset   • Cancer Mother 80        Cancer when 80 yrs old   • Heart disease Mother    • Hypertension Mother    • Heart disease Father    • Cancer Father         Heart   46   • Diabetes Neg Hx    • Stroke Neg Hx        MEDICATIONS / ALLERGIES:    all current active meds have been reviewed    No Known Allergies    OBJECTIVE:    Physical Exam  Physical Exam  Constitutional:       General: He is not in acute distress  Appearance: He is obese  He is ill-appearing  HENT:      Head: Normocephalic  Nose: Nose normal       Mouth/Throat:      Mouth: Mucous membranes are dry  Eyes:      Extraocular Movements: Extraocular movements intact  Cardiovascular:      Rate and Rhythm: Normal rate  Pulmonary:      Effort: Pulmonary effort is normal  No respiratory distress  Abdominal:      General: There is no distension  Palpations: Abdomen is soft  Tenderness: There is no abdominal tenderness  Musculoskeletal:         General: Normal range of motion  Cervical back: Normal range of motion  Right lower leg: No edema  Left lower leg: No edema  Skin:     General: Skin is warm and dry  Findings: Bruising present  Neurological:      Mental Status: He is disoriented  Psychiatric:         Behavior: Behavior normal          Lab Results: I have personally reviewed pertinent labs  Imaging Studies: pertinent studies reviewed  EKG, Pathology, and Other Studies: pertinent studies reviewed    Counseling / Coordination of Care    Total floor / unit time spent today 40 minutes  Greater than 50% of total time was spent with the patient and / or family counseling and / or coordination of care   A description of the counseling / coordination of care: assessment, emotional support, GOC discussion, communication with team

## 2023-01-16 NOTE — ASSESSMENT & PLAN NOTE
Lab Results   Component Value Date    EGFR 30 01/16/2023    EGFR 28 01/15/2023    EGFR 37 01/14/2023    CREATININE 2 01 (H) 01/16/2023    CREATININE 2 07 (H) 01/15/2023    CREATININE 1 69 (H) 01/14/2023     · Baseline creatinine 1 3-1 6  · Cr  1 69 today   · Trend BMP intermittently   · Continue home torsemide 40 mg daily for now 20-Apr-2018 23-May-2018 31-May-2018 06-Jun-2018

## 2023-01-16 NOTE — PLAN OF CARE
Problem: Prexisting or High Potential for Compromised Skin Integrity  Goal: Skin integrity is maintained or improved  Description: INTERVENTIONS:  - Identify patients at risk for skin breakdown  - Assess and monitor skin integrity  - Assess and monitor nutrition and hydration status  - Monitor labs   - Assess for incontinence   - Turn and reposition patient  - Assist with mobility/ambulation  - Relieve pressure over bony prominences  - Avoid friction and shearing  - Provide appropriate hygiene as needed including keeping skin clean and dry  - Evaluate need for skin moisturizer/barrier cream  - Collaborate with interdisciplinary team   - Patient/family teaching  - Consider wound care consult   Outcome: Progressing     Problem: MOBILITY - ADULT  Goal: Maintain or return to baseline ADL function  Description: INTERVENTIONS:  -  Assess patient's ability to carry out ADLs; assess patient's baseline for ADL function and identify physical deficits which impact ability to perform ADLs (bathing, care of mouth/teeth, toileting, grooming, dressing, etc )  - Assess/evaluate cause of self-care deficits   - Assess range of motion  - Assess patient's mobility; develop plan if impaired  - Assess patient's need for assistive devices and provide as appropriate  - Encourage maximum independence but intervene and supervise when necessary  - Involve family in performance of ADLs  - Assess for home care needs following discharge   - Consider OT consult to assist with ADL evaluation and planning for discharge  - Provide patient education as appropriate  Outcome: Progressing  Goal: Maintains/Returns to pre admission functional level  Description: INTERVENTIONS:  - Perform BMAT or MOVE assessment daily    - Set and communicate daily mobility goal to care team and patient/family/caregiver  - Collaborate with rehabilitation services on mobility goals if consulted  - Perform Range of Motion 3 times a day    - Reposition patient every 2 hours   - Dangle patient 3 times a day  - Stand patient 3 times a day  - Ambulate patient 3 times a day  - Out of bed to chair 3 times a day   - Out of bed for meals 3 times a day  - Out of bed for toileting  - Record patient progress and toleration of activity level   Outcome: Progressing     Problem: Potential for Falls  Goal: Patient will remain free of falls  Description: INTERVENTIONS:  - Educate patient/family on patient safety including physical limitations  - Instruct patient to call for assistance with activity   - Consult OT/PT to assist with strengthening/mobility   - Keep Call bell within reach  - Keep bed low and locked with side rails adjusted as appropriate  - Keep care items and personal belongings within reach  - Initiate and maintain comfort rounds  - Make Fall Risk Sign visible to staff  - Offer Toileting every 2 Hours, in advance of need  - Initiate/Maintain bedalarm  - Obtain necessary fall risk management equipment:   - Apply yellow socks and bracelet for high fall risk patients  - Consider moving patient to room near nurses station  Outcome: Progressing     Problem: Nutrition/Hydration-ADULT  Goal: Nutrient/Hydration intake appropriate for improving, restoring or maintaining nutritional needs  Description: Monitor and assess patient's nutrition/hydration status for malnutrition  Collaborate with interdisciplinary team and initiate plan and interventions as ordered  Monitor patient's weight and dietary intake as ordered or per policy  Utilize nutrition screening tool and intervene as necessary  Determine patient's food preferences and provide high-protein, high-caloric foods as appropriate       INTERVENTIONS:  - Monitor oral intake, urinary output, labs, and treatment plans  - Assess nutrition and hydration status and recommend course of action  - Evaluate amount of meals eaten  - Assist patient with eating if necessary   - Allow adequate time for meals  - Recommend/ encourage appropriate diets, oral nutritional supplements, and vitamin/mineral supplements  - Order, calculate, and assess calorie counts as needed  - Recommend, monitor, and adjust tube feedings and TPN/PPN based on assessed needs  - Assess need for intravenous fluids  - Provide specific nutrition/hydration education as appropriate  - Include patient/family/caregiver in decisions related to nutrition  Outcome: Progressing

## 2023-01-16 NOTE — PROGRESS NOTES
[address] [facility]  Progress Note - Kelvin March 1940, 80 y o  male MRN: [id number]  Unit/Bed#: [facility] 606-01 Encounter: [id number]  Primary Care Provider: Kerri Richardson,    Date and time admitted to hospital: 1/12/2023 11:49 PM    Hyperglycemia  Assessment & Plan  · Noted on AM BMP  · HgbA1c 5 6%  · D/c further finger sticks  · Trend on morning blood work    Metabolic encephalopathy  Assessment & Plan  · Likely secondary to brain lesion as noted above  · Continue supportive care and IV antibiotic therapy   · Will need repeat CT head next week per neurosurgical recommendations, unable to obtain MRI brain due to non compatible pacemaker     MSSA bacteremia  Assessment & Plan  On recent admission noted to have MSSA bacteremia  • Plan was for 6 weeks of IV antibiotic cefazolin through 1/17/2023, now discontinued   • Patient underwent TTE and MARY on prior admission  • Negative repeat blood cultures  • ID following,  • EP consulted for evaluation of possible PPM extraction, awaiting repeat CT head next week to evaluate for any improvement in brain lesions with IV abx alone   • Continue IV Naficillin 2 g Q4H    Chronic diastolic heart failure (HCC)  Assessment & Plan  Wt Readings from Last 3 Encounters:   01/16/23 108 kg (238 lb 12 1 oz)   01/12/23 112 kg (246 lb)   01/03/23 113 kg (249 lb)     · Recent cardiac echo with EF 60% and severe AS  · Continue diuretics with torsemide 40 mg daily   · Monitor volume status, currently appears euvolemic         COPD (chronic obstructive pulmonary disease) (HCC)  Assessment & Plan  No exacerbation, stable on room air  • On trelegy, substituted with breo and incruse  • Continue scheduled nebulizer treatments      Essential hypertension  Assessment & Plan  · Acceptable BP on review   · Continue Torsemide 40 mg daily   · Monitor    Hypokalemia  Assessment & Plan  · K+ 3 1 today, replete  · Serial BMP monitoring     Stage 3a chronic kidney disease [facility])  Assessment & Plan  Lab Results   Component Value Date    EGFR 30 01/16/2023    EGFR 28 01/15/2023    EGFR 37 01/14/2023    CREATININE 2 01 (H) 01/16/2023    CREATININE 2 07 (H) 01/15/2023    CREATININE 1 69 (H) 01/14/2023     · Baseline creatinine 1 3-1 6  · Cr  1 69 today   · Trend BMP intermittently   · Continue home torsemide 40 mg daily for now    JOVI (obstructive sleep apnea)  Assessment & Plan  · Continue with CPAP nightly    Chronic a-fib ([address] )  Assessment & Plan  S/p pacemaker placement, not maintained on any rate controlling medications at this time  • Holding Xarelto for now per neurosurgery   • EP evaluating for possible PPM extraction pending clinical course as concern for PPM infection with recent MSSA bacteremia contributing to brain lesions/abscesses     * Brain lesion  Assessment & Plan  Patient presented with progressive confusion, generalized weakness, and decreased oral intake to [address] [place] [address] Avenue   • CT head showed peripherally enhancing lesion within the left parasagittal parietal lobe with surrounding vasogenic edema likely abscess given smooth peripheral rim enhancement development of ill defined enhancing lesion within right inferior paramedian cerebellar hemisphere with mild associated edema   • Unable to obtain MRI due to pacemaker which is not compatible per prior documentation   • ESR 56, CRP 20  • Transferred to [facility] [place] [facility] evaluation  • Neurosurgery following,  • Continue IV abx per ID recommendations as concern for probable brain abscesses   • No surgical intervention at this time, will need repeat CT head next week to evaluate for improvement on antibiotic regimen and re-evaluate need for surgery at that time   • Continue to hold Xarelto in the interim in case surgery is needed in the future during this admission    • ID consulted, currently on IV Nafcillin 2 g Q4H  • EP consulted for recommendations regarding possible pacer infection, extraction • Repeat blood cultures neg x 24 and 72 hours, trend   • Repeat TTE results pending  • Continue with IV nafcillin      VTE Pharmacologic Prophylaxis: VTE Score: 10 High Risk (Score >/= 5) - Pharmacological DVT Prophylaxis Contraindicated  Sequential Compression Devices Ordered  Patient Centered Rounds: I performed bedside rounds with nursing staff today  Discussions with Specialists or Other Care Team Provider: Palliative Care    Education and Discussions with Family / Patient: Updated  (daughter) at bedside  Time Spent for Care: 45 minutes  More than 50% of total time spent on counseling and coordination of care as described above  Current Length of Stay: 4 day(s)  Current Patient Status: Inpatient   Certification Statement: The patient will continue to require additional inpatient hospital stay due to Treatment plan decision making and IV abx  Discharge Plan: Anticipate discharge in 48-72 hrs to rehab facility  Code Status: Level 1 - Full Code    Subjective:   Seen and examined at bedside  Patient lying in bed, daughter at bedside states he recently went himself  Mihaela Hasten himself has no complaints  Objective:     Vitals:   Temp (24hrs), Av 1 °F (37 3 °C), Min:98 8 °F (37 1 °C), Max:99 7 °F (37 6 °C)    Temp:  [98 8 °F (37 1 °C)-99 7 °F (37 6 °C)] 99 7 °F (37 6 °C)  HR:  [84-93] 93  Resp:  [16] 16  BP: (131-139)/(68-72) 139/72  SpO2:  [93 %-100 %] 93 %  Body mass index is 30 65 kg/m²  Input and Output Summary (last 24 hours): Intake/Output Summary (Last 24 hours) at 2023 1730  Last data filed at 2023 1704  Gross per 24 hour   Intake 418 ml   Output 2150 ml   Net -1732 ml       Physical Exam:   Physical Exam  Vitals reviewed  Constitutional:       General: He is not in acute distress  HENT:      Head: Normocephalic and atraumatic  Mouth/Throat:      Mouth: Mucous membranes are moist    Eyes:      General: No scleral icterus    Cardiovascular:      Rate and Rhythm: Normal rate and regular rhythm  Pulses: Normal pulses  Heart sounds: No murmur heard  Pulmonary:      Effort: Pulmonary effort is normal  No respiratory distress  Breath sounds: No wheezing  Abdominal:      General: There is no distension  Palpations: Abdomen is soft  Tenderness: There is no abdominal tenderness  Musculoskeletal:      Right lower leg: No edema  Left lower leg: No edema  Skin:     General: Skin is warm and dry  Capillary Refill: Capillary refill takes less than 2 seconds  Neurological:      General: No focal deficit present  Mental Status: He is alert and oriented to person, place, and time  Sensory: No sensory deficit  Psychiatric:         Mood and Affect: Mood normal          Behavior: Behavior normal           Additional Data:     Labs:  Results from last 7 days   Lab Units 01/16/23  0534 01/14/23  1339 01/13/23  0605   WBC Thousand/uL 7 69   < > 6 87   HEMOGLOBIN g/dL 8 7*   < > 8 3*   HEMATOCRIT % 30 4*   < > 27 3*   PLATELETS Thousands/uL 207   < > 202   NEUTROS PCT %  --   --  79*   LYMPHS PCT %  --   --  12*   MONOS PCT %  --   --  7   EOS PCT %  --   --  1    < > = values in this interval not displayed       Results from last 7 days   Lab Units 01/16/23  0534 01/15/23  0557   SODIUM mmol/L 137 135   POTASSIUM mmol/L 3 1* 3 6   CHLORIDE mmol/L 97 97   CO2 mmol/L 34* 31   BUN mg/dL 25 26*   CREATININE mg/dL 2 01* 2 07*   ANION GAP mmol/L 6 7   CALCIUM mg/dL 8 5 8 4   ALBUMIN g/dL  --  2 0*   TOTAL BILIRUBIN mg/dL  --  1 68*   ALK PHOS U/L  --  91   ALT U/L  --  <6*   AST U/L  --  17   GLUCOSE RANDOM mg/dL 146* 159*         Results from last 7 days   Lab Units 01/14/23  1135 01/14/23  0813   POC GLUCOSE mg/dl 158* 163*     Results from last 7 days   Lab Units 01/14/23  0552   HEMOGLOBIN A1C % 5 6     Results from last 7 days   Lab Units 01/10/23  2120   LACTIC ACID mmol/L 1 4       Lines/Drains:  Invasive Devices     Central Venous Catheter Line  Duration           CVC Central Lines 12/12/22 Single Right Subclavian 35 days          Drain  Duration           External Urinary Catheter Small 5 days                Central Line:  Goal for removal: Port accessed  Will de-access as appropriate  Imaging: Reviewed radiology reports from this admission including: CT head    Recent Cultures (last 7 days):   Results from last 7 days   Lab Units 01/13/23  0105 01/10/23  2128 01/10/23  2120   BLOOD CULTURE  No Growth at 72 hrs  No Growth at 72 hrs  No Growth After 5 Days  No Growth After 5 Days  Last 24 Hours Medication List:   Current Facility-Administered Medications   Medication Dose Route Frequency Provider Last Rate   • acetaminophen  650 mg Oral Q6H PRN Mandeep Mesa, DO     • acetylcysteine  3 mL Nebulization TID Mandeep Mesa, DO     • allopurinol  200 mg Oral Daily Mandeep Mesa, DO     • docusate sodium  100 mg Oral BID Mandeep Moshe, DO     • DULoxetine  60 mg Oral BID Mandeep Moshe, DO     • finasteride  5 mg Oral Daily Mandeep Moshe, DO     • fluticasone-vilanterol  1 puff Inhalation Daily Mandeep Mesa, DO     • levalbuterol  1 25 mg Nebulization TID Mandeep Mesa, DO     • nafcillin  2,000 mg Intravenous Q4H Mandeep Moshe, DO 2,000 mg (01/16/23 1704)   • pantoprazole  40 mg Oral Early Morning Mandeep Mesa, DO     • polyethylene glycol  17 g Oral Daily Mandeep Mesa, DO     • potassium chloride  40 mEq Oral Daily Tiffany Hua     • saccharomyces boulardii  250 mg Oral BID Mandeep Moshe, DO     • senna  8 6 mg Oral HS Mandeep Mesa, DO     • sodium chloride (PF)  10 mL Intravenous [facility] Mandeep Hanksst, DO     • torsemide  40 mg Oral Daily Mandeep Hanksst, DO     • umeclidinium  1 puff Inhalation Daily Mandeep Mesa, DO          Today, Patient Was Seen By: Tiffany Hua    **Please Note: This note may have been constructed using a voice recognition system  **

## 2023-01-16 NOTE — RESTORATIVE TECHNICIAN NOTE
Restorative Technician Note      Patient Name: Andrea Chaney     Restorative Tech Visit Date: 01/16/23  Note Type: Mobility  Patient Position Upon Consult: Supine  Activity Performed: Repositioned  Patient Position at End of Consult: Supine;  All needs within reach; Bed/Chair alarm activated    Mau Freed Restorative Technician

## 2023-01-16 NOTE — ASSESSMENT & PLAN NOTE
Wt Readings from Last 3 Encounters:   01/16/23 108 kg (238 lb 12 1 oz)   01/12/23 112 kg (246 lb)   01/03/23 113 kg (249 lb)     · Recent cardiac echo with EF 60% and severe AS  · Continue diuretics with torsemide 40 mg daily   · Monitor volume status, currently appears euvolemic

## 2023-01-16 NOTE — ASSESSMENT & PLAN NOTE
Patient presented with progressive confusion, generalized weakness, and decreased oral intake to [address] [place] [address]   • CT head showed peripherally enhancing lesion within the left parasagittal parietal lobe with surrounding vasogenic edema likely abscess given smooth peripheral rim enhancement development of ill defined enhancing lesion within right inferior paramedian cerebellar hemisphere with mild associated edema   • Unable to obtain MRI due to pacemaker which is not compatible per prior documentation   • ESR 56, CRP 20  • Transferred to [facility] for neurosurgical evaluation  • Neurosurgery following,  • Continue IV abx per ID recommendations as concern for probable brain abscesses   • No surgical intervention at this time, will need repeat CT head next week to evaluate for improvement on antibiotic regimen and re-evaluate need for surgery at that time   • Continue to hold Xarelto in the interim in case surgery is needed in the future during this admission    • ID consulted, currently on IV Nafcillin 2 g Q4H  • EP consulted for recommendations regarding possible pacer infection, extraction   • Repeat blood cultures neg x 24 and 72 hours, trend   • Repeat TTE results pending  • Continue with IV nafcillin

## 2023-01-16 NOTE — TELEPHONE ENCOUNTER
Appointment Cancellation Or Reschedule     Person calling In wife   If other than patient calling, are they listed on the communication consent form? yes   Provider [facility] Visit Date and Time 1/20 12:40PM   Office Visit Location [facility]   Did patient want to reschedule their office appointment? If so, when was it scheduled to? No, will call   Did you have STAR scheduled for this appointment? no   Do you need STAR set up for your new appointment? If yes, please send to "PATIENT RIDESHARE" pool for STAR rescheduling no   If you are cancelling appointment, can we notify STAR to cancel ride? If yes, please send to "PATIENT RIDESHARE" pool for STAR to cancel service no   Is this patient calling to reschedule an infusion appointment? no   When is their next infusion appointment? no   Is this patient a Chemo patient? no   Reason for Cancellation or Reschedule hospitalized     If the patient is a treatment patient, please route this to the office nurse  If the patient is not on treatment, please route to the Clerical pool based on location  If the patient is a surgical oncology patient, please route to surg/onc clinical pool  Route message as high priority if same day cancellation

## 2023-01-16 NOTE — TELEPHONE ENCOUNTER
[facility] and states the the pt's end date is tomorrow  They would like to know if we are extending treatment at all and what we are doing with PICC line

## 2023-01-16 NOTE — PROGRESS NOTES
Progress Note - Electrophysiology-Cardiology (EP)   [place] 80 y o  male MRN: [id number]  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  a  Unable to obtain MRI due to incompatible pacemaker  b  Holding anticoagulation  c  CT head this week to monitor intracranial collections  d  Patient not competent on exam  3  Pulmonary septic emboli  4  Presence of [employer] dual chamber pacemaker  a  RA/RV leads implanted 3/22/2012  b  Generator change 9/16/2022  c  MARY 12/7/22 - no evidence of vegetation  5  Atrial fibrillation  6  Severe AS  7  CKD 3  8  COPD  9  Chronic diastolic heart failure  10  CAD s/p LEOBARDO      Risks and benefits of device extraction were discussed with the daughter  Explained that the patient is not a candidate for CT surgery backup during device extraction, so his risk of life threatening complication perioperatively is higher than average  We have still not come to a decision about whether or not a device extraction is wanted by the family  Recommend palliative care consult to help determine goals of care  Will reevaluate tomorrow  Daughter would like to know the prognosis for neurological recovery if brain lesion is successful treated  Subjective/Objective     Subjective: Upon evaluation, patient is resting in bed with no distress or agitation  Daughter is present bedside  Unable to obtain ROS  Daughter states he is able to say her name at times, although his cognition is fluctuating  Discussed risks and benefits of device extraction          Objective:     Vitals: /70   Pulse 84   Temp 98 8 °F (37 1 °C)   Resp 16   Ht 6' 2" (1 88 m)   Wt 108 kg (238 lb 12 1 oz)   SpO2 99%   BMI 30 65 kg/m²   Vitals:    01/16/23 0536 Weight: 108 kg (238 lb 12 1 oz)     Orthostatic Blood Pressures    Flowsheet Row Most Recent Value   Blood Pressure 138/70 filed at 01/16/2023 1216            Intake/Output Summary (Last 24 hours) at 1/16/2023 1049  Last data filed at 1/16/2023 0521  Gross per 24 hour   Intake 780 ml   Output 2150 ml   Net -1370 ml       Invasive Devices     Central Venous Catheter Line  Duration           CVC Central Lines 12/12/22 Single Right Subclavian 34 days          Drain  Duration           External Urinary Catheter Small 5 days                            Scheduled Meds:  Current Facility-Administered Medications   Medication Dose Route Frequency Provider Last Rate   • acetaminophen  650 mg Oral Q6H PRN Dior Litten, DO     • acetylcysteine  3 mL Nebulization TID Dior Litten, DO     • allopurinol  200 mg Oral Daily Dior Litten, DO     • docusate sodium  100 mg Oral BID Dior Litten, DO     • DULoxetine  60 mg Oral BID Dior Litten, DO     • finasteride  5 mg Oral Daily Dior Litten, DO     • fluticasone-vilanterol  1 puff Inhalation Daily Dior Litten, DO     • levalbuterol  1 25 mg Nebulization TID Dior Litten, DO     • nafcillin  2,000 mg Intravenous Q4H Dior Litten, DO 2,000 mg (01/16/23 9616)   • pantoprazole  40 mg Oral Early Morning Dior Litten, DO     • polyethylene glycol  17 g Oral Daily Dior Litten, DO     • saccharomyces boulardii  250 mg Oral BID Dior Litten, DO     • senna  8 6 mg Oral HS Dior Litten, DO     • sodium chloride (PF)  10 mL Intravenous [facility] Dior Litten, DO     • torsemide  40 mg Oral Daily Dior Litten, DO     • umeclidinium  1 puff Inhalation Daily Dior Litten, DO       Continuous Infusions:   PRN Meds: •  acetaminophen    Review of Systems:  ROS as noted above, otherwise 12 point review of systems was performed and is negative  Physical Exam:   Physical Exam  Vitals reviewed  Constitutional:       General: He is not in acute distress       Appearance: He is not ill-appearing or diaphoretic  HENT:      Head: Normocephalic and atraumatic  Right Ear: External ear normal       Left Ear: External ear normal       Nose: Nose normal    Eyes:      General:         Right eye: No discharge  Left eye: No discharge  Cardiovascular:      Rate and Rhythm: Normal rate and regular rhythm  Heart sounds: No murmur heard  No friction rub  Pulmonary:      Effort: Pulmonary effort is normal       Breath sounds: Rales present  No wheezing or rhonchi  Abdominal:      General: There is no distension  Palpations: Abdomen is soft  Tenderness: There is no abdominal tenderness  Musculoskeletal:         General: No deformity or signs of injury  Cervical back: No rigidity  No muscular tenderness  Right lower leg: No edema  Left lower leg: No edema  Skin:     General: Skin is warm and dry  Capillary Refill: Capillary refill takes less than 2 seconds  Coloration: Skin is not jaundiced or pale  Neurological:      Mental Status: He is alert  Psychiatric:         Speech: Speech is slurred  Cognition and Memory: Cognition is impaired  Lab Results: I have personally reviewed pertinent lab results      Results from last 7 days   Lab Units 01/16/23  0534 01/15/23  0557 01/14/23  1339   WBC Thousand/uL 7 69 8 97 8 05   HEMOGLOBIN g/dL 8 7* 8 4* 9 1*   HEMATOCRIT % 30 4* 29 2* 30 5*   PLATELETS Thousands/uL 207 201 195     Results from last 7 days   Lab Units 01/16/23  0534 01/15/23  0557 01/14/23  0549   POTASSIUM mmol/L 3 1* 3 6 3 3*   CHLORIDE mmol/L 97 97 100   CO2 mmol/L 34* 31 31   BUN mg/dL 25 26* 20   CREATININE mg/dL 2 01* 2 07* 1 69*   CALCIUM mg/dL 8 5 8 4 8 5         Results from last 7 days   Lab Units 01/15/23  0557 01/13/23  0605 01/12/23  0427   MAGNESIUM mg/dL 1 9 2 1 2 0         Imaging: I have personally reviewed pertinent films in PACS  ECHO: 1/12/23  •  Left Ventricle: Left ventricular cavity size is normal  Wall thickness is severely increased  There is severe concentric hypertrophy  The left ventricular ejection fraction is 55% by visual estimation  Systolic function is normal  Although no diagnostic regional wall motion abnormality was identified, this possibility cannot be completely excluded on the basis of this study  Unable to assess diastolic function  •  IVS: There is abnormal septal motion consistent with right ventricular pacing  •  Right Ventricle: Systolic function is mildly reduced  A pacer wire is present  •  Left Atrium: The atrium is severely dilated  •  Right Atrium: The atrium is mildly dilated  •  Aortic Valve: The aortic valve is trileaflet  The leaflets are severely thickened  The leaflets are severely calcified  There is severely reduced mobility  There is mild regurgitation  There is severe stenosis  The aortic valve peak velocity is 4 12 m/s  The aortic valve peak gradient is 68 mmHg  The aortic valve mean gradient is 35 mmHg  The dimensionless velocity index is 0 28   •  Mitral Valve: There is moderate diffuse thickening of the anterior leaflet and posterior leaflet  There is mild calcification  There is moderately reduced mobility of the posterior leaflet  There is moderate annular calcification  There is mild to moderate regurgitation  There is mild to moderate stenosis          VTE Pharmacologic Prophylaxis: Reason for no pharmacologic prophylaxis brain lesion  VTE Mechanical Prophylaxis: sequential compression device

## 2023-01-17 NOTE — PROGRESS NOTES
Progress note - Palliative and Supportive Care   Alejandro Garzon 80 y o  male [id number]    Patient Active Problem List   Diagnosis   • Chronic pain syndrome   • Bilateral lumbar radiculopathy   • Lumbar canal stenosis   • Pain in both lower extremities   • JOO (generalized anxiety disorder)   • Benign hypertension with chronic kidney disease, stage III (formerly [facility])   • Persistent proteinuria   • Benign prostatic hyperplasia   • Allergic rhinitis   • Aneurysm of abdominal aorta   • Chronic a-fib (formerly [facility])   • Carpal tunnel syndrome   • Chronic gout without tophus   • Centrilobular emphysema (formerly [facility])   • Deep venous insufficiency   • Difficulty in walking   • Fecal soiling   • Fibromyalgia   • Hyperlipidemia   • Moderate or severe vision impairment, one eye   • Class 2 obesity due to excess calories without serious comorbidity with body mass index (BMI) of 36 0 to 36 9 in adult   • Smoker   • Pacemaker   • JOVI (obstructive sleep apnea)   • Urinary incontinence   • Venous insufficiency (chronic) (peripheral)   • Peripheral arteriosclerosis (formerly [facility])   • Serum total bilirubin elevated   • Stage 3a chronic kidney disease (formerly [facility])   • CAD (coronary artery disease)   • Status post primary angioplasty with coronary stent   • Hypokalemia   • Mixed simple and mucopurulent chronic bronchitis (formerly [facility])   • Hyperuricemia   • Bilateral leg edema   • Nephrolithiasis   • Former smoker   • Symptomatic anemia   • Vitamin D insufficiency   • Chronic constipation   • Idiopathic hematuria   • Kidney stone on left side   • Flank pain   • Obesity (BMI 30-39  9)   • Dyspnea on exertion   • Neuropathy   • Coronary artery arteriosclerosis   • Essential hypertension   • Memory difficulty   • COPD (chronic obstructive pulmonary disease) (formerly [facility])   • Chronic diastolic heart failure (formerly [facility])   • Falls   • Closed fracture of right femur (formerly [facility])   • Valvular heart disease   • Pressure injury of right buttock, unstageable (formerly [facility])   • Ambulatory dysfunction   • Generalized weakness • Depression   • Dysphagia, pharyngoesophageal phase   • Gastro-esophageal reflux disease without esophagitis   • Generalized muscle weakness   • History of falling   • Iron deficiency anemia   • Major depressive disorder, recurrent, unspecified (HCC)   • Moderate to severe aortic stenosis   • Other abnormalities of gait and mobility   • Other specified polyneuropathies   • Pulmonary hypertension due to left heart disease (HCC)   • Presence of coronary angioplasty implant and graft   • Unspecified hearing loss, unspecified ear   • Primary osteoarthritis of both knees   • Left hip pain   • Cervical strain   • Lump of skin of left lower extremity   • Elevated troponin   • Anemia   • Depression with anxiety   • MSSA bacteremia   • Abnormal CAT scan   • Influenza   • Brain lesion   • Metabolic encephalopathy   • Hyperglycemia     Active issues specifically addressed today include: encephalopathy, bacteremia, brain lesions    Plan:  1  Symptom management -    - will defer to primary team    2  Goals -   - the family's stated goal is to get the patient home and ensure he is comfortable, they have said they will likely not wish to do invasive procedures but would like to wait to see effect of ABx before making any definite decisions  Code Status: Full - Level 1   Decisional apparatus:  Patient is not competent on my exam today  If competence is lost, patient's substitute decision maker would default to wife by [place]  Advance Directive / Living Will / POLST:  As documented    Interval history:       Pt seen and examined at bedside  Reports feeling well and smiling broadly today  He denies SOB, Pain, dysuria, constipation, insomnia  His daughter at bedside states that her heart is breaking watching her father in the hospital and that she and her family really feel that they do not want to do any invasive procedures due to his extensive medical comorbid conditions  She is feeling overwhelmed   She wonders whether all the specialists are on the same page  She agrees with the plan to wait to decide how to approach the situation until after the Abx have been given a chance to take effect  MEDICATIONS / ALLERGIES:     all current active meds have been reviewed    No Known Allergies    OBJECTIVE:    Physical Exam  Physical Exam  Constitutional:       General: He is not in acute distress  Appearance: He is obese  He is ill-appearing  HENT:      Head: Normocephalic  Nose: Nose normal       Mouth/Throat:      Mouth: Mucous membranes are moist    Eyes:      Extraocular Movements: Extraocular movements intact  Cardiovascular:      Rate and Rhythm: Normal rate  Pulmonary:      Effort: Pulmonary effort is normal  No respiratory distress  Abdominal:      General: There is no distension  Palpations: Abdomen is soft  Musculoskeletal:         General: Normal range of motion  Cervical back: Normal range of motion  Right lower leg: No edema  Left lower leg: No edema  Skin:     General: Skin is warm and dry  Findings: Bruising present  Neurological:      Mental Status: He is alert  He is disoriented  Psychiatric:         Mood and Affect: Mood normal          Behavior: Behavior normal          Lab Results: I have personally reviewed pertinent labs  Imaging Studies: pertinent studies reviewed  EKG, Pathology, and Other Studies: pertinent studies reviewed    Counseling / Coordination of Care    Total floor / unit time spent today 30 minutes  Greater than 50% of total time was spent with the patient and / or family counseling and / or coordination of care   A description of the counseling / coordination of care: assessment, communication with family and team, emotional support, ongoing [id number] discussion

## 2023-01-17 NOTE — PROGRESS NOTES
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  In setting of recent MSSA bacteremia  Progressive encephalopathy and radiographic changes despite cefazolin, possible due to poor CNS penetration  Consider endocarditis  Current blood cultures negative  Initial MARY  with heavy AV/MV calcification, unable to exclude vegetation  TTE  technically difficult  Rec:  · Continue Nafcillin for now  · Check repeat CT head later this week  · Follow mental status closely    Septic pulmonary emboli  In setting of bacteremia as above  Interval enlargement since last CT   Consider PPM infection  Rec:  · Continue antibiotics as above  · Possible PPM extraction later this week    Recent MSSA bacteremia  Diagnosed 22, thought due to skin source in setting of multiple wounds  Consider endocarditis versus PPM infection  Initial MARY  with heavy AV/MV calcification, unable to exclude vegetation  Rec:  · Continue antibiotics as above    Acute encephalopathy  PPM   Placed   SHAWNEE on CKD  Baseline Cr 1 3-1 6  COPD  Chronic CHF  The above plan was discussed in detail with the patient's daughter at the bedside  Antibiotics:  Nafcillin #7  Antibiotics #42    Subjective:  Patient seen on AM rounds  Daughter at bedside helps provide history  Still very confused, does not recognize her  Attempting to get OOB multiple times  Incontinent  24 Hour Events:  No documented fevers, chills, sweats, nausea, vomiting, or diarrhea      Objective:  Vitals:  Temp:  [98 2 °F (36 8 °C)-99 7 °F (37 6 °C)] 98 2 °F (36 8 °C)  HR:  [86-93] 86  Resp:  [18-19] 19  BP: (112-139)/(72-78) 112/78  SpO2:  [93 %-96 %] 93 %  Temp (24hrs), Av °F (37 2 °C), Min:98 2 °F (36 8 °C), Max:99 7 °F (37 6 °C)  Current: Temperature: 98 2 °F (36 8 °C)    Physical Exam: General:  No acute distress  Psychiatric:  Awake but confused  Pulmonary:  Normal respiratory excursion without accessory muscle use  Abdomen:  Soft, nontender  Extremities:  No edema  Skin:  No rashes    Lab Results:  I have personally reviewed pertinent labs  Results from last 7 days   Lab Units 01/17/23  0512 01/16/23  0534 01/15/23  0557 01/14/23  0549 01/13/23  0605   POTASSIUM mmol/L 2 9* 3 1* 3 6 3 3* 3 2*   CHLORIDE mmol/L 97 97 97 100 98   CO2 mmol/L 33* 34* 31 31 32   BUN mg/dL 25 25 26* 20 18   CREATININE mg/dL 2 20* 2 01* 2 07* 1 69* 1 55*   EGFR ml/min/1 73sq m 26 30 28 37 41   CALCIUM mg/dL 8 8 8 5 8 4 8 5 8 5   AST U/L  --   --  17 15 16   ALT U/L  --   --  <6* <6* <6*   ALK PHOS U/L  --   --  91 70 68     Results from last 7 days   Lab Units 01/17/23  0512 01/16/23  0534 01/15/23  0557   WBC Thousand/uL 8 14 7 69 8 97   HEMOGLOBIN g/dL 9 2* 8 7* 8 4*   PLATELETS Thousands/uL 229 207 201     Results from last 7 days   Lab Units 01/13/23  0105 01/10/23  2128 01/10/23  2120   BLOOD CULTURE  No Growth After 4 Days  No Growth After 4 Days  No Growth After 5 Days  No Growth After 5 Days  Imaging Studies:   I have personally reviewed pertinent imaging study reports and images in PACS  EKG, Pathology, and Other Studies:   I have personally reviewed pertinent reports

## 2023-01-17 NOTE — OCCUPATIONAL THERAPY NOTE
Occupational Therapy Treatment Note:      01/17/23 1545   OT Last Visit   OT Visit Date 01/17/23   Note Type   Note Type Treatment   Pain Assessment   Pain Assessment Tool 0-10   Pain Score No Pain   ADL   Where Assessed Edge of bed   Functional Standing Tolerance   Time f- balance c standing for incont management x 3 this session  stood for 1 min each c asst x 2   Bed Mobility   Supine to Sit 3  Moderate assistance   Additional items Assist x 2   Sit to Supine 3  Moderate assistance   Additional items Assist x 2   Transfers   Sit to Stand 3  Moderate assistance   Additional items Assist x 2   Stand to Sit 3  Moderate assistance   Additional items Assist x 2   Cognition   Overall Cognitive Status Impaired   Arousal/Participation Alert   Activity Tolerance   Activity Tolerance Patient tolerated treatment well   Assessment   Assessment PT PARTICIPATED IN PM OT SESSION AND WAS SEEN FOCUSING ON UB DRESSING, BED MOBILITY, SIT TO FROM STAND X 3, ACTIVITY TOLERENCE, AND COGNITION  PT IS NOTED TO REQUIRE ASST X 2 AND WAS INCONTINNENT OF BOWEL AND BLADDER  PT WAS ALERT BUT APPEARS CONFUSED AND NEEDED TA FOR TOILETING NEEDS / MAX ASST UB DRESSING WHILE IN BED WITH HOB ELEVATED  WILL FOLLOW FOCUSING ON GOALS FROM IE   Plan   Treatment Interventions ADL retraining;Functional transfer training; Endurance training;Patient/family training; Activityengagement   Goal Expiration Date 01/28/23   OT Treatment Day 1   OT Frequency 2-3x/wk   Recommendation   OT Discharge Recommendation Post acute rehabilitation services   AM-PAC Daily Activity Inpatient   Lower Body Dressing 2   Bathing 2   Toileting 1   Upper Body Dressing 2   Grooming 2   Eating 3   Daily Activity Raw Score 12   Daily Activity Standardized Score (Calc for Raw Score >=11) 30 6   AM-PAC Applied Cognition Inpatient   Following a Speech/Presentation 2   Understanding Ordinary Conversation 3   Taking Medications 1   Remembering Where Things Are Placed or Put Away 2 Remembering List of 4-5 Errands 2   Taking Care of Complicated Tasks 1   Applied Cognition Raw Score 11   Applied Cognition Standardized Score 27 03

## 2023-01-17 NOTE — ASSESSMENT & PLAN NOTE
· Likely secondary to brain lesion as noted above  · Continue supportive care and IV antibiotic therapy   · Plan for repeat imaging, TBD

## 2023-01-17 NOTE — WOUND OSTOMY CARE
Progress Note - Wound   Owen Baig 80 y o  male MRN: [id number]  Unit/Bed#: [facility] 606-01 Encounter: [id number]      Assessment: Wound care to see patient for follow-up visit of multiple wounds  Patient transferred from [facility]  Admitted with brain lesion  History of - CHF, COPD, HTN, CKD, JOVI, a-fib, and CKD  Patient seen in bed, alert and awake oriented to self, confused  Dependent for care  Max assist of two with turning for the assessment  Foam wedges are in use for repositioning  Incontinent of bowel and bladder, iwona-care rendered at time of the assessment  Will recommend P-500 mattress  B/l heels are dry and intact without redness or wounds  1  L posterior hand is dry and intact with fragile ecchymotic skin  No open aspect, redness or drainage  Okay to keep TELMA  2   L upper extremity is dry and intact with fragile ecchymotic skin  Areas well adhered dry scabbing noted  No open aspect, redness or drainage  Okay to keep TELMA  3  RLE is dry and intact with hemosiderin staining  No open aspect, redness or drainage  Okay to keep TELMA  4  L anterior foot - irregular shaped full thickness wound of unclear etiology  Wound bed is approx: 30% moist adhered yellow slough and 70% moist pink tissue  Edges fragile and attached without maceration  Iwona-wound is dry and intact with fragile blanchable hyperpigmented skin  Low drainage amount on old dressing   -will recommend dermagran gauze and dressing  L anterior forearm     R posterior hand      R anterior forearm near antecubital area  5  L anterior forearm, R posterior hand, and R anterior forearm near antecubital area with partial thickness skin tears present  Wounds measured below in flow-sheets  Wound beds with moist pink/red tissue  Edges fragile and attached without maceration  No skin flaps to approximate  Iwona-wound is dry and intact with fragile ecchymotic skin  -will recommend dermagran gauze and dressing         6  R buttock - evolving DTI - HA  Unfortunately appears to have declined when compared to previous images  Wound is linear shaped with full thickness opening with scattered partial thickness skin loss to the medial b/l sacro-buttocks  All aspects measured together  Suspect mixed etiology of pressure and moisture  Wound is approx: 30% intact non-blanchable purple colored skin and 70% moist pink/red tissue  Fecal staining noted to the denuded/macerated skin  Edges fragile and attached with maceration  denuded skin noted to the mid sacral which is intact and blanchable  Reynaldo-wound / skin to the b/l sacro-buttocks is intact with blanchable pink/hyperpigmented skin and scarring   -due to incontinence will recommend TRIAD paste    No induration, fluctuance, odor, warmth/temperature differences, redness, or purulence noted to the above mentioned wounds and skin areas assessed  New dressings applied  Patient tolerated assessment well- denies pain and no s/s of non-verbal pain or discomfort observed during the encounter  Primary nurse aware of plan of care  See flow sheets for more detailed assessment findings  Will follow along  Plan:   1  Apply Allevyn foam to heels, jean marie w/P, peel foam check skin integrity q-shift  Change q3d and PRN for soilage/dislodgement  2  Apply skin nourishing cream the entire skin daily for moisture  3  Turn and reposition patient every  2 hours   4  Elevate heels off of bed with pillows to offload pressure   5  Apply EHOB waffle cushion to chair when OOB, if able  6  Cleanse b/l sacro-buttocks with soap and water, and pat dry  Apply TRIAD paste to the R buttock wound and apply hydraguard cream to the reynaldo-wound and intact with skin of the b/l sacro-buttocks TID and PRN  7  Cleanse L anterior foot wound with NSS, pat dry, and apply dermagran gauze to the wound bed  Cover with small bordered Allevyn foam dressing   Jean Marie dressing with T  Change every other day and as needed for soilage/dislodgement  8  P-500 mattress  9  Cleanse b/l upper extremity skin tears with NSS, pat dry, and apply dermagran to the wound beds  Cover with ABD pads and secure the dressings with kia wrap  Change every other day and as needed for soilage/dislodgement  10  [facility] outpatient - please call [phone] for an appointment  (Ambulatory referral placed)  Objective:    Vitals: Blood pressure 114/77, pulse 97, temperature 99 6 °F (37 6 °C), resp  rate 20, height 6' 2" (1 88 m), weight 107 kg (236 lb 9 6 oz), SpO2 98 %  ,Body mass index is 30 38 kg/m²  Wound 12/24/22 Skin Tear Arm Right; Lower; Anterior (Active)   Wound Image   01/17/23 1104   Wound Description Beefy red;Pink 01/17/23 1104   Iwona-wound Assessment Dry; Intact;Fragile 01/17/23 1104   Wound Length (cm) 2 5 cm 01/17/23 1104   Wound Width (cm) 4 cm 01/17/23 1104   Wound Depth (cm) 0 1 cm 01/17/23 1104   Wound Surface Area (cm^2) 10 cm^2 01/17/23 1104   Wound Volume (cm^3) 1 cm^3 01/17/23 1104   Calculated Wound Volume (cm^3) 1 cm^3 01/17/23 1104   Tunneling 0 cm 01/17/23 1104   Tunneling in depth located at 0 01/17/23 1104   Undermining 0 01/17/23 1104   Undermining is depth extending from 0 01/17/23 1104   Drainage Amount Small 01/17/23 1104   Drainage Description Bloody 01/17/23 1104   Non-staged Wound Description Partial thickness 01/17/23 1104   Treatments Cleansed 01/17/23 1104   Dressing Dermagran gauze;Dry dressing 01/17/23 1104   Wound packed? No 01/17/23 1104   Packing- # removed 0 01/17/23 1104   Packing- # inserted 0 01/17/23 1104   Dressing Changed New 01/17/23 1104   Patient Tolerance Tolerated well 01/17/23 1104   Dressing Status Clean;Dry; Intact 01/17/23 1104       Wound 01/11/23 Other (comment) Foot Anterior; Left (Active)   Wound Image   01/17/23 1058   Wound Description Pink;Slough; Yellow 01/17/23 1058   Iwona-wound Assessment Dry; Intact 01/17/23 1058   Wound Length (cm) 1 cm 01/17/23 1058   Wound Width (cm) 1 cm 01/17/23 1058   Wound Depth (cm) 0 2 cm 01/17/23 1058   Wound Surface Area (cm^2) 1 cm^2 01/17/23 1058   Wound Volume (cm^3) 0 2 cm^3 01/17/23 1058   Calculated Wound Volume (cm^3) 0 2 cm^3 01/17/23 1058   Tunneling 0 cm 01/17/23 1058   Tunneling in depth located at 0 01/17/23 1058   Undermining 0 01/17/23 1058   Undermining is depth extending from 0 01/17/23 1058   Drainage Amount Small 01/17/23 1058   Drainage Description Serosanguineous 01/17/23 1058   Non-staged Wound Description Full thickness 01/17/23 1058   Treatments Cleansed 01/17/23 1058   Dressing Dermagran gauze; Foam, Silicon (eg  Allevyn, etc) 01/17/23 1058   Wound packed? No 01/17/23 1058   Packing- # removed 0 01/17/23 1058   Packing- # inserted 0 01/17/23 1058   Dressing Changed New 01/17/23 1058   Patient Tolerance Tolerated well 01/17/23 1058   Dressing Status Clean;Dry; Intact 01/17/23 1058       Wound 01/11/23 Pressure Injury Buttocks Right (Active)   Wound Image   01/17/23 1114   Wound Description Beefy red;Pink;Light purple 01/17/23 1114   Pressure Injury Stage DTPI 01/17/23 1114   Iwona-wound Assessment Intact;Fragile; Maceration; Hyperpigmented;Pink;Scar Tissue 01/17/23 1114   Wound Length (cm) 4 cm 01/17/23 1114   Wound Width (cm) 9 cm 01/17/23 1114   Wound Depth (cm) 0 3 cm 01/17/23 1114   Wound Surface Area (cm^2) 36 cm^2 01/17/23 1114   Wound Volume (cm^3) 10 8 cm^3 01/17/23 1114   Calculated Wound Volume (cm^3) 10 8 cm^3 01/17/23 1114   Tunneling 0 cm 01/17/23 1114   Tunneling in depth located at 0 01/17/23 1114   Undermining 0 01/17/23 1114   Undermining is depth extending from 0 01/17/23 1114   Drainage Amount Small 01/17/23 1114   Drainage Description Serosanguineous 01/17/23 1114   Non-staged Wound Description Full thickness 01/17/23 1114   Treatments Cleansed 01/17/23 1114   Dressing Protective barrier 01/17/23 1114   Wound packed?  No 01/17/23 1114   Packing- # removed 0 01/17/23 1114   Packing- # inserted 0 01/17/23 1114   Patient Tolerance Tolerated well 01/17/23 1114       Wound 01/11/23 Skin Tear Hand Posterior;Right (Active)   Wound Image   01/17/23 1103   Wound Description Beefy red 01/17/23 1103   Iwona-wound Assessment Intact;Dry;Fragile 01/17/23 1103   Wound Length (cm) 2 cm 01/17/23 1103   Wound Width (cm) 1 5 cm 01/17/23 1103   Wound Depth (cm) 0 1 cm 01/17/23 1103   Wound Surface Area (cm^2) 3 cm^2 01/17/23 1103   Wound Volume (cm^3) 0 3 cm^3 01/17/23 1103   Calculated Wound Volume (cm^3) 0 3 cm^3 01/17/23 1103   Change in Wound Size % -650 01/17/23 1103   Tunneling 0 cm 01/17/23 1103   Tunneling in depth located at 0 01/17/23 1103   Undermining 0 01/17/23 1103   Undermining is depth extending from 0 01/17/23 1103   Drainage Amount Small 01/17/23 1103   Drainage Description Bloody 01/17/23 1103   Non-staged Wound Description Partial thickness 01/17/23 1103   Treatments Cleansed 01/17/23 1103   Dressing Dermagran gauze;Dry dressing 01/17/23 1103   Wound packed? No 01/17/23 1103   Packing- # removed 0 01/17/23 1103   Packing- # inserted 0 01/17/23 1103   Dressing Changed New 01/17/23 1103   Patient Tolerance Tolerated well 01/17/23 1103   Dressing Status Clean;Dry; Intact 01/17/23 1103       Wound 01/11/23 Skin Tear Arm Anterior;Left;Lower (Active)   Wound Image   01/17/23 1100   Wound Description Beefy red;Pink 01/17/23 1100   Iwona-wound Assessment Dry; Intact;Fragile 01/17/23 1100   Wound Length (cm) 2 5 cm 01/17/23 1100   Wound Width (cm) 1 cm 01/17/23 1100   Wound Depth (cm) 0 1 cm 01/17/23 1100   Wound Surface Area (cm^2) 2 5 cm^2 01/17/23 1100   Wound Volume (cm^3) 0 25 cm^3 01/17/23 1100   Calculated Wound Volume (cm^3) 0 25 cm^3 01/17/23 1100   Tunneling 0 cm 01/17/23 1100   Tunneling in depth located at 0 01/17/23 1100   Undermining 0 01/17/23 1100   Undermining is depth extending from 0 01/17/23 1100   Drainage Amount Small 01/17/23 1100   Drainage Description Bloody 01/17/23 1100 Non-staged Wound Description Partial thickness 01/17/23 1100   Treatments Cleansed 01/17/23 1100   Dressing Dermagran gauze;Dry dressing 01/17/23 1100   Wound packed? No 01/17/23 1100   Packing- # removed 0 01/17/23 1100   Packing- # inserted 0 01/17/23 1100   Dressing Changed New 01/17/23 1100   Patient Tolerance Tolerated well 01/17/23 1100   Dressing Status Clean;Dry; Intact 01/17/23 1100         Tiger text with questions or concerns  Wound care will follow along routinely  AVS updated    Francisco Sifuentes RN MSN [facility]

## 2023-01-17 NOTE — PROGRESS NOTES
[address] [facility]  Progress Note Evmeaghan Medico 1940, 80 y o  male MRN: [id number]  Unit/Bed#: [facility] 606-01 Encounter: [id number]  Primary Care Provider: Cheryl Palacios DO   Date and time admitted to hospital: 1/12/2023 11:49 PM    * Brain lesion  Assessment & Plan  Patient presented with progressive confusion, generalized weakness, and decreased oral intake to [address] [facility]  Transfer to [place] for neurosurgical evaluation after CT of head showed enhancing lesion within the left parasagittal parietal lobe with surrounding vasogenic edema likely abscess  • Unable to obtain MRI due to pacemaker which is not compatible per prior documentation   • ESR 56, CRP 20  • Neurosurgery following,  • Continue IV nafcillin Q4 per infectious disease  • No surgical intervention at this time, plan for CT head to evaluate for improvement on antibiotic regimen and reevaluate potential need for surgery  Timing TBD based on renal function  • Holding Xarelto in the interim until can exclude need for surgical intervention  · Patient with history of PPM, ID recommending removal but patient is high risk  Ongoing discussions with family  · Echo with Significant calcification or mitral and aortic valve  Difficult to exclude vegetation  If high clinical suspicion would need MARY  · Repeat [facility] 1/13 negative   • PT/OT recommending rehab  • Rest of plan as below     MSSA bacteremia  Assessment & Plan  On recent admission noted to have MSSA bacteremia 12/5  Likely originated from skin breakdown on arms and legs  Complicated by presence of pacemaker  TTE and MARY on prior admission negative  Had been receiving outpatient cefazolin through PICC line  Repeat blood cultures are negative however imaging concerning for ongoing pulmonary and out CNS septic emboli    • Continue IV Naficillin 2 g Q4H per infectious disease    Metabolic encephalopathy  Assessment & Plan  · Likely secondary to brain lesion as noted above  · Continue supportive care and IV antibiotic therapy   · Plan for repeat imaging, TBD  Chronic diastolic heart failure (HCC)  Assessment & Plan  Wt Readings from Last 3 Encounters:   01/17/23 107 kg (236 lb 9 6 oz)   01/12/23 112 kg (246 lb)   01/03/23 113 kg (249 lb)     · Recent cardiac echo with EF 60% and severe AS  · Continue diuretics with torsemide 40 mg daily   · Monitor volume status, currently appears euvolemic         Chronic a-fib ([facility] )  Assessment & Plan  S/p pacemaker placement, not maintained on any rate controlling medications at this time  • Holding Xarelto for now per neurosurgery   • EP evaluating for possible PPM extraction pending clinical course as concern for PPM infection with recent MSSA bacteremia contributing to brain lesions/abscesses     COPD (chronic obstructive pulmonary disease) ([facility] )  Assessment & Plan  No exacerbation, stable on room air  • On trelegy, substituted with breo and incruse  • Continue scheduled nebulizer treatments  Essential hypertension  Assessment & Plan  · Acceptable BP on review   · Continue Torsemide 40 mg daily   · Monitor    Hyperglycemia  Assessment & Plan  · Noted on AM BMP  · HgbA1c 5 6%  · D/c further finger sticks  · Trend on morning blood work    Hypokalemia  Assessment & Plan  · K+ 2 9 today  · 40 meq given PO, will give additional 20 IV  · Telemetry until K > 3  · BMP/mag in AM    JOVI (obstructive sleep apnea)  Assessment & Plan  · Continue with CPAP nightly    Stage 3a chronic kidney disease [facility])  Assessment & Plan  Lab Results   Component Value Date    EGFR 26 01/17/2023    EGFR 30 01/16/2023    EGFR 28 01/15/2023    CREATININE 2 20 (H) 01/17/2023    CREATININE 2 01 (H) 01/16/2023    CREATININE 2 07 (H) 01/15/2023     · Baseline creatinine 1 3-1 6, up to 2 20 today  SHAWNEE on CKD     · No hydronephrosis noted on CT A/P 1/12  · Check PVR  · Hold diuretics  · Consult nephrology given need for contrast CT  · BMP in AM      VTE Pharmacologic Prophylaxis: VTE Score: 10 Moderate Risk (Score 3-4) - Pharmacological DVT Prophylaxis Contraindicated  Sequential Compression Devices Ordered  Patient Centered Rounds: I performed bedside rounds with nursing staff today  Discussions with Specialists or Other Care Team Provider: nursing, case management, neurosurgery, nephrolgoy     Education and Discussions with Family / Patient: Updated  (daughter) at bedside  [place]     Time Spent for Care: 30 minutes  More than 50% of total time spent on counseling and coordination of care as described above  Current Length of Stay: 5 day(s)    Current Patient Status: Inpatient     Certification Statement: The patient will continue to require additional inpatient hospital stay due to repeat imaging, additonal neurosx/EP input, SHAWNEE requiring nephro consult, GOC, discharge planning     Discharge Plan: Anticipate discharge in >72 hrs to rehab facility  Code Status: Level 1 - Full Code    Subjective:   Patient offers no acute complaints  He is confused  Objective:     Vitals:   Temp (24hrs), Av °F (37 2 °C), Min:98 2 °F (36 8 °C), Max:99 7 °F (37 6 °C)    Temp:  [98 2 °F (36 8 °C)-99 7 °F (37 6 °C)] 98 2 °F (36 8 °C)  HR:  [86-93] 86  Resp:  [18-19] 19  BP: (112-139)/(72-78) 112/78  SpO2:  [93 %-96 %] 93 %  Body mass index is 30 38 kg/m²  Input and Output Summary (last 24 hours): Intake/Output Summary (Last 24 hours) at 2023 1056  Last data filed at 2023 0900  Gross per 24 hour   Intake 596 ml   Output --   Net 596 ml       Physical Exam:   Physical Exam  Vitals and nursing note reviewed  Constitutional:       General: He is not in acute distress  Appearance: He is obese  Cardiovascular:      Rate and Rhythm: Normal rate  Heart sounds: Murmur heard  Pulmonary:      Breath sounds: Decreased breath sounds and rhonchi present  Comments: Loose cough noted   Abdominal:      Tenderness:  There is no abdominal tenderness  Musculoskeletal:         General: No swelling  Skin:     General: Skin is warm  Findings: Bruising present  Neurological:      Mental Status: He is alert  He is disoriented  Psychiatric:         Mood and Affect: Mood normal           Additional Data:     Labs:  Results from last 7 days   Lab Units 01/17/23  0512 01/14/23  1339 01/13/23  0605   WBC Thousand/uL 8 14   < > 6 87   HEMOGLOBIN g/dL 9 2*   < > 8 3*   HEMATOCRIT % 30 4*   < > 27 3*   PLATELETS Thousands/uL 229   < > 202   NEUTROS PCT %  --   --  79*   LYMPHS PCT %  --   --  12*   MONOS PCT %  --   --  7   EOS PCT %  --   --  1    < > = values in this interval not displayed  Results from last 7 days   Lab Units 01/17/23  0512 01/16/23  0534 01/15/23  0557   SODIUM mmol/L 137   < > 135   POTASSIUM mmol/L 2 9*   < > 3 6   CHLORIDE mmol/L 97   < > 97   CO2 mmol/L 33*   < > 31   BUN mg/dL 25   < > 26*   CREATININE mg/dL 2 20*   < > 2 07*   ANION GAP mmol/L 7   < > 7   CALCIUM mg/dL 8 8   < > 8 4   ALBUMIN g/dL  --   --  2 0*   TOTAL BILIRUBIN mg/dL  --   --  1 68*   ALK PHOS U/L  --   --  91   ALT U/L  --   --  <6*   AST U/L  --   --  17   GLUCOSE RANDOM mg/dL 144*   < > 159*    < > = values in this interval not displayed  Results from last 7 days   Lab Units 01/14/23  1135 01/14/23  0813   POC GLUCOSE mg/dl 158* 163*     Results from last 7 days   Lab Units 01/14/23  0552   HEMOGLOBIN A1C % 5 6     Results from last 7 days   Lab Units 01/10/23  2120   LACTIC ACID mmol/L 1 4       Lines/Drains:  Invasive Devices     Central Venous Catheter Line  Duration           CVC Central Lines 12/12/22 Single Right Subclavian 35 days          Drain  Duration           External Urinary Catheter Small 6 days                Central Line:  Goal for removal: N/A - Discharging with PICC for IV ABX/medications             Imaging: No pertinent imaging reviewed      Recent Cultures (last 7 days):   Results from last 7 days   Lab Units 01/13/23  0105 01/10/23  2128 01/10/23  2120   BLOOD CULTURE  No Growth After 4 Days  No Growth After 4 Days  No Growth After 5 Days  No Growth After 5 Days  Last 24 Hours Medication List:   Current Facility-Administered Medications   Medication Dose Route Frequency Provider Last Rate   • acetaminophen  650 mg Oral Q6H PRN Artelia Shorten, DO     • acetylcysteine  3 mL Nebulization TID Artelia Shorten, DO     • allopurinol  200 mg Oral Daily Artelia Shorten, DO     • docusate sodium  100 mg Oral BID Artelia Shorten, DO     • DULoxetine  60 mg Oral BID Artelia Shorten, DO     • finasteride  5 mg Oral Daily Artelia Shorten, DO     • fluticasone-vilanterol  1 puff Inhalation Daily Artelia Shorten, DO     • levalbuterol  1 25 mg Nebulization TID Artelia Shorten, DO     • nafcillin  2,000 mg Intravenous Q4H Artelia Shorten, DO 2,000 mg (01/17/23 0753)   • pantoprazole  40 mg Oral Early Morning Artelia Shorten, DO     • polyethylene glycol  17 g Oral Daily Artelia Shorten, DO     • potassium chloride  20 mEq Intravenous Once NAVA Gleason     • saccharomyces boulardii  250 mg Oral BID Artelia Shorten, DO     • senna  8 6 mg Oral HS Artelia Shorten, DO     • sodium chloride (PF)  10 mL Intravenous [facility] Artelia Shorten, DO     • umeclidinium  1 puff Inhalation Daily Artelia Shorten, DO          Today, Patient Was Seen By: NAVA Martin    **Please Note: This note may have been constructed using a voice recognition system  **

## 2023-01-17 NOTE — PHYSICAL THERAPY NOTE
Physical Therapy Progress Note     01/17/23 1520   PT Last Visit   PT Visit Date 01/17/23   Note Type   Note Type Treatment   Pain Assessment   Pain Score No Pain   Restrictions/Precautions   Other Precautions Cognitive; Bed Alarm; Fall Risk;Telemetry;Multiple lines   Subjective   Subjective Pt encountered supine in bed, fidgeting with feet nearly off EOB  Pt able to answer simple questions & reports no complaints  Demosntrates audible breath sounds throughout session, but denies dizziness once sitting EOB  Attends to instructions with some repetition, but does so appropriately when executing tasks  Bed Mobility   Supine to Sit 3  Moderate assistance   Additional items Assist x 2   Sit to Supine 3  Moderate assistance   Additional items Assist x 2   Transfers   Sit to Stand 3  Moderate assistance   Additional items Assist x 2   Stand to Sit 3  Moderate assistance   Additional items Assist x 2   Balance   Static Sitting Fair   Static Standing Poor   Dynamic Standing Poor -   Endurance Deficit   Endurance Deficit Yes   Endurance Deficit Description generalized fatigue, dyspnea with activity, SpO2 difficult to assess due to poor signal, but varied between 85-91% on RA when signal appeared patent   Activity Tolerance   Activity Tolerance Patient tolerated treatment well;Patient limited by fatigue   Nurse Kinza Kumar, RN   Assessment   Prognosis Guarded   Problem List Decreased strength;Decreased range of motion;Decreased endurance; Impaired balance;Decreased mobility; Decreased cognition; Impaired judgement;Decreased safety awareness;Pain   Assessment pt continues to require Ax2 for all functional transfers, but is able to participate in all tasks & initiate all sit to stand transfers from EOB  He does fatigue quickly in static standing, maintaining stand for no greater than 30 seconds during any of the 3 trials performed during session    Pt attempted L sidestepping towards HOB, but did not possess sufficient strength & balance to do so after initially advancing LLE, requiring return to sitting prior to returning to bed  Once returned to supine, pt repositioned in bed & HOB elevated to facilitate improved respiratory status  Kevin Esparzas monitor relocated in attempt to achieve accurate reading, at which time respiratory therapy arrived at RN request   Pt will continue to benefit from PT services to address functional deficits & work towards PLOF pending evolving medical status/POC  Goals   Patient Goals to rest   STG Expiration Date 01/26/23   PT Treatment Day 1   Plan   Treatment/Interventions Functional transfer training;LE strengthening/ROM; Elevations; Therapeutic exercise; Endurance training;Patient/family training;Equipment eval/education; Bed mobility;Gait training   Progress Slow progress, decreased activity tolerance   PT Frequency 3-5x/wk   Recommendation   PT Discharge Recommendation Post acute rehabilitation services   Equipment Recommended   (continue to assess, trial RW next session)   AM-PAC Basic Mobility Inpatient   Turning in Flat Bed Without Bedrails 2   Lying on Back to Sitting on Edge of Flat Bed Without Bedrails 2   Moving Bed to Chair 1   Standing Up From Chair Using Arms 1   Walk in Room 1   Climb 3-5 Stairs With Railing 1   Basic Mobility Inpatient Raw Score 8   Turning Head Towards Sound 3   Follow Simple Instructions 3   Low Function Basic Mobility Raw Score 14   Low Function Basic Mobility Standardized Score 22 01   Highest Level Of Mobility   JH-HLM Goal 3: Sit at edge of bed   JH-HLM Achieved 5: Stand (1 or more minutes)       Gallito Song PTA    An [facility] Basic Mobility Standardized Score of 40 78 suggests pt would benefit from post acute rehab

## 2023-01-17 NOTE — RESPIRATORY THERAPY NOTE
RT Note:    Large amount of thick, yellow accumulated secretions collected via NT suctioning x 4 this hour associated with aerosol therapy Rx with Mucomyst and Xopenex  Pt unable or previously unwilling to cough and clear his own secretions today  See other notes listed below  RT to continue with NT Suctioning if and when pt's sensorium returns to that observed earlier today  01/17/23 1526   Inhalation Therapy Tx   $ Inhalation Therapy Performed Yes   $ Pulse Oximetry Spot Check Charge Completed   Pre-Treatment Pulse 96   Pre-Treatment Respirations 20   Duration 15   Breath Sounds Pre-Treatment Bilateral Coarse   Breath Sounds Post-Treatment Bilateral Coarse   Post-Treatment Pulse 100   Post-Treatment Respirations 22   Delivery Source Air;UDN   Position High Fitzpatrick's   Treatment Tolerance Tolerated well   Resp Comments Nrsg called asking for 2nd attempt to give afternoon Rx   Kourtney Leon, RRT was able to NT suction sucessfully x 4 with RN Barry Nichole assisting  Pt was preoxygenated and SpO2 monitored throughout suctioning procedure  Aerosol therapy Rx given to its completion with the Mucomyse and Xopenex  Cough Description   Sputum Amount Large   Sputum Color Yellow   Sputum Consistency Thick   Sputum How Obtained Nasal tracheal   Nasal Suctioning/Secretions   Suction Type Nasopharyngeal   Suction Device Catheter   Secretion Amount Large   Secretion Color Yellow   Secretion Consistency Purulent; Thick   Suction Tolerance Tolerated fairly well   Suctioning Adverse Effects None   Airway Suctioning/Secretions   Suction Type Nasal   Suction Device Catheter   Secretion Amount Large   Secretion Color Yellow   Secretion Consistency Thick; Tenacious   Suction Tolerance Tolerated fairly well   Suctioning Adverse Effects None

## 2023-01-17 NOTE — CASE MANAGEMENT
Case Management Discharge Planning Note    Patient name Romelia Irish  Location [facility] 606/[facility] 045-61 MRN [id number]  : 1940 Date 2023       Current Admission Date: 2023  Current Admission Diagnosis:Brain lesion   Patient Active Problem List    Diagnosis Date Noted   • Hyperglycemia    • Metabolic encephalopathy    • Brain lesion 01/10/2023   • Influenza 2022   • Anemia 2022   • Depression with anxiety 2022   • MSSA bacteremia 2022   • Abnormal CAT scan 2022   • Elevated troponin 2022   • Left hip pain 2022   • Cervical strain 2022   • Lump of skin of left lower extremity 2022   • Primary osteoarthritis of both knees 2022   • Depression 2022   • Generalized weakness 2022   • Valvular heart disease 2022   • Pressure injury of right buttock, unstageable ([facility] ) 2022   • Ambulatory dysfunction 2022   • Dysphagia, pharyngoesophageal phase 2022   • Gastro-esophageal reflux disease without esophagitis 2022   • Generalized muscle weakness 2022   • History of falling 2022   • Iron deficiency anemia 2022   • Major depressive disorder, recurrent, unspecified ([facility] ) 2022   • Moderate to severe aortic stenosis 2022   • Other abnormalities of gait and mobility 2022   • Other specified polyneuropathies 2022   • Pulmonary hypertension due to left heart disease ([facility] ) 2022   • Presence of coronary angioplasty implant and graft 2022   • Unspecified hearing loss, unspecified ear 2022   • Falls 2022   • Closed fracture of right femur ([facility] ) 2022   • COPD (chronic obstructive pulmonary disease) ([facility] ) 2021   • Chronic diastolic heart failure ([facility] ) 2021   • Memory difficulty 2021   • Neuropathy 2021   • Coronary artery arteriosclerosis 2021   • Essential hypertension 2021   • Dyspnea on exertion    • Idiopathic hematuria 04/01/2021   • Kidney stone on left side 04/01/2021   • Flank pain 04/01/2021   • Obesity (BMI 30-39 9) 04/01/2021   • Chronic constipation 02/16/2021   • Vitamin D insufficiency 11/13/2020   • Symptomatic anemia 09/13/2020   • Former smoker 01/13/2020   • Nephrolithiasis 10/30/2019   • Bilateral leg edema 09/26/2019   • Hyperuricemia 07/22/2019   • Mixed simple and mucopurulent chronic bronchitis ([facility] ) 07/17/2019   • Hypokalemia 06/24/2019   • CAD (coronary artery disease) 05/13/2019   • Status post primary angioplasty with coronary stent 05/13/2019   • Stage 3a chronic kidney disease ([facility] ) 04/18/2019   • Serum total bilirubin elevated 04/08/2019   • Peripheral arteriosclerosis ([facility] ) 01/03/2019   • Pain in both lower extremities 01/31/2018   • Benign hypertension with chronic kidney disease, stage III ([facility] ) 06/27/2017   • JOO (generalized anxiety disorder) 06/07/2016   • Chronic pain syndrome 03/17/2016   • Bilateral lumbar radiculopathy 03/17/2016   • Lumbar canal stenosis 03/17/2016   • Difficulty in walking 09/11/2015   • Persistent proteinuria 01/08/2015   • Urinary incontinence 10/30/2014   • Aneurysm of abdominal aorta 04/07/2014   • Centrilobular emphysema ([facility] ) 04/07/2014   • Deep venous insufficiency 04/07/2014   • Hyperlipidemia 04/07/2014   • Pacemaker 04/07/2014   • Fibromyalgia 08/08/2013   • 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/2013   • Fecal soiling 03/26/2013   • Class 2 obesity due to excess calories without serious comorbidity with body mass index (BMI) of 36 0 to 36 9 in adult 03/26/2013   • Chronic a-fib ([facility] ) 01/23/2013   • Allergic rhinitis 10/01/2012   • Benign prostatic hyperplasia 09/04/2012   • Carpal tunnel syndrome 09/04/2012   • Moderate or severe vision impairment, one eye 09/04/2012   • Smoker 09/04/2012   • JOVI (obstructive sleep apnea) 09/04/2012   • Venous insufficiency (chronic) (peripheral) 09/04/2012      LOS (days): 5  Geometric Mean LOS (GMLOS) (days): 8 10  Days to GMLOS:3 6     OBJECTIVE:  Risk of Unplanned Readmission Score: 34 68         Current admission status: Inpatient   Preferred Pharmacy:   [id number] [place], [phone] [facility] [address],  [address] [id number]  Phone: [phone] Fax: [phone]    [facility] [address] [facility], [address] [place] [address]  Phone: [phone] Fax: [phone]    [facility] [address] [address]  Phone: [phone] Fax: [phone]    Primary Care Provider: Riaz Lopes DO    Primary Insurance: MEDICARE  Secondary Insurance: COMMERCIAL MISCELLANEOUS    DISCHARGE DETAILS: TC to wife, stating that the only accepting facility at this time is [facility]- wife to get back to this

## 2023-01-17 NOTE — PROGRESS NOTES
Progress Note - Infectious Disease   Aman Weinstein 80 y o  male MRN: [id number]  Unit/Bed#: [facility] 606-01 Encounter: [id number]      Impression/Plan:    1  Brain lesions, probable abscesses   Most likely complication of recent MSSA bacteremia, as below  Patient presented with weakness, falls, confusion, CT head with contrast shows new enhancing lesions in the left parasagittal parietal lobe as well as right inferior paramedian cerebellar hemisphere was associated edema  Patient is unable to have MRI due to pacemaker   Fortunately, he is afebrile and clinically stable, with no meningismus or overt focal neurologic deficits  Mental status is somewhat improving  Neurosurgery recommended possible stereotactic biopsy but the patient's family would like to avoid invasive procedures and monitor his response to antibiotics  Repeat blood cultures show no growth  -continue IV nafcillin 2 g every 4 hours   -appreciate neurosurgery follow-up, plan for repeat CT head later this week  -Serial neuro exams  -Will require prolonged treatment course  -I had a long discussion with the family about clinical situation and concern for ongoing septic emboli to the lungs and now the CNS despite weeks of antibiotics  Without device removal, he may continue to have embolic events  EP has discussed device extraction with the family, this would be feasible however high risk  The family is unsure if they want to proceed with any invasive interventions  For now, I think it is reasonable to follow repeat CT for any interval change or new lesions  If he continues to have septic emboli despite antibiotic change, will need to make a decision regarding device removal or transition to more comfort focused measures     2  MSSA bacteremia   Positive blood cultures from 12/5/2022  Nivia Gonzalez originated from skin breakdown on arms and legs   Complicated by presence of pacemaker    Recent MARY showed no definitive large vegetations but could not exclude small lesions   Recent CT chest showed numerous pulmonary nodules concerning for septic emboli   Family opted to pursue conservative approach with retention of pacemaker since would be a very difficult procedure to remove   Patient has been receiving outpatient cefazolin via PICC line prior to this admission  1/10/23 he presented with confusion found to have #1   Patient is afebrile and clinically stable  Repeat blood cultures are negative but imaging concerning for ongoing pulmonary and now CNS septic emboli  TTE unchanged from prior  Unusual for him to have right and left sided embolic phenomenon unless MV/AV and PPM lead are seeded  -Antibiotic plan as above     3   Pacemaker in place   As above, MARY showed no overt vegetations   However, high suspicion for pacer infection   Patient and family opted to retain pacer on last admission   But now complicated by #1  -Antibiotic plan as above  -EP consulted, device removal is possible and has been offered to the family but is higher risk  -Recommend repeat MARY     4   Bilateral pulmonary nodules   Likely septic emboli in the setting of recent MSSA bacteremia with some nodules enlarged while others decrease in size on serial CTs most recent on 1/12/23  -Antibiotic plan as above  -Will need continued follow-up imaging in the future reassess for treatment response     5   CKD 3   Creatinine remains relatively stable   -No dose adjustment needed for nafcillin  -Follow BMP     I have discussed the above management plan in detail with the primary service and family at bedside  ID will follow  Antibiotics:  Nafcillin day 6    Subjective:  Mental status is somewhat improved today, although the patient is impulsive and trying to get out of bed  He has no fever, chills  He denies shortness of breath, chest pain, headache      Objective:  Vitals:  Temp:  [98 8 °F (37 1 °C)-99 7 °F (37 6 °C)] 99 7 °F (37 6 °C)  HR:  [84-93] 93  Resp:  [16] 16  BP: (131-139)/(68-72) 139/72  SpO2:  [93 %-100 %] 93 %  Temp (24hrs), Av 1 °F (37 3 °C), Min:98 8 °F (37 1 °C), Max:99 7 °F (37 6 °C)  Current: Temperature: 99 7 °F (37 6 °C)    Physical Exam:   General Appearance:  Chronically ill appearing, but now awake and more alert   Throat: Oropharynx moist without lesions  Lungs:   Coarse breath sounds bilaterally    Heart:  RRR; no murmur, rub or gallop   Abdomen:   Soft, non-tender, non-distended, positive bowel sounds  Extremities: No clubbing, cyanosis or edema   Skin: No new rashes or lesions  Multiple ecchymoses        Labs: All pertinent labs and imaging studies were personally reviewed  Results from last 7 days   Lab Units 23  0534 01/15/23  0557 23  1339   WBC Thousand/uL 7 69 8 97 8 05   HEMOGLOBIN g/dL 8 7* 8 4* 9 1*   PLATELETS Thousands/uL 207 201 195     Results from last 7 days   Lab Units 23  0534 01/15/23  0557 23  0549 23  0605   SODIUM mmol/L 137 135 138 136   POTASSIUM mmol/L 3 1* 3 6 3 3* 3 2*   CHLORIDE mmol/L 97 97 100 98   CO2 mmol/L 34* 31 31 32   BUN mg/dL 25 26* 20 18   CREATININE mg/dL 2 01* 2 07* 1 69* 1 55*   EGFR ml/min/1 73sq m 30 28 37 41   CALCIUM mg/dL 8 5 8 4 8 5 8 5   AST U/L  --  17 15 16   ALT U/L  --  <6* <6* <6*   ALK PHOS U/L  --  91 70 68         Results from last 7 days   Lab Units 23  0522   CRP mg/L 20 3*               Micro:  Results from last 7 days   Lab Units 23  0105 01/10/23  2128 01/10/23  2120   BLOOD CULTURE  No Growth at 72 hrs  No Growth at 72 hrs  No Growth After 5 Days  No Growth After 5 Days  Imaging:  I have personally reviewed pertinent imaging study reports and images in PACS      CTH with contrast :  Peripherally enhancing lesion within the left parasagittal parietal lobe with surrounding vasogenic edema, similar to the examination from the day before and new since 12/15/2022       Interval development of an ill-defined enhancing lesion within the right inferior paramedian cerebellar hemisphere with mild associated edema

## 2023-01-17 NOTE — ASSESSMENT & PLAN NOTE
Wt Readings from Last 3 Encounters:   01/17/23 107 kg (236 lb 9 6 oz)   01/12/23 112 kg (246 lb)   01/03/23 113 kg (249 lb)     · Recent cardiac echo with EF 60% and severe AS  · Continue diuretics with torsemide 40 mg daily   · Monitor volume status, currently appears euvolemic

## 2023-01-17 NOTE — CONSULTS
[address] Jewangelina Salazar 80 y o  male MRN: [id number]  Unit/Bed#: [facility] 606-01 Encounter: [id number]    ASSESSMENT and PLAN:    70-year-old male with a history of chronic kidney disease stage III, chronic obstructive pulmonary disease, coronary artery disease, BPH atrial fibrillation, with recent MSSA bacteremia presented to the hospital for worsening confusion and multiple falls as well as decreased oral intake  Nephrology consult for acute kidney injury with need of a contrast study  Acute Kidney Injury  --With underlying chronic kidney disease  -- Admission creatinine 1 5 on January 10 presented on January 12 with 1 58 which was within his baseline  --Renal function appears to have started to worsen on around January 13/January 14 and has continued to worsen prompting a nephrology consultation  -- Urinalysis: Urinalysis on January 10 showed a bland urine analysis  We will repeat another urine analysis now that he is has worsening kidney function  -- Imaging: CT scan with intravenous contrast on January 12 showed no evidence of hydronephrosis  Subcentimeter hypodensities within the bilateral kidneys too small to characterize  Nonobstructing calculus in the left kidney  -- Serologies: We will follow-up urine analysis first before proceeding with serologies  -- Etiology: Suspect that there could be a component of acute tubular necrosis primarily in the setting of active infection with a component of decreased oral intake, and cannot rule out a possible component of contrast so she nephropathy from admission  -- Status: Renal function continues to worsen creatinine has trended up to 2 2 mg/dL  -- Plan:   · Hold torsemide, dose was given this morning  · Check a postvoid residual     · Check a repeat chest x-ray  · Check a urine analysis      · Check a fractional secretion of urea  · At this time we will hold off on contrast study until his renal function improves and trends closer to baseline    Chronic kidney disease stage IIIB  -- Outpatient nephrologist Dr Martinez  -- Baseline creatinine now at 1 5 to 1 8 mg/dL    Hypokalemia  --Hold diuretic  --Check a repeat magnesium level tomorrow  -- Patient given potassium replacement via IV  -- Poor oral intake    Brain lesion  -- Concern for possible abscess  -- Need a contrast study    MSSA bacteremia  -- Present on the last hospitalization  -- Was receiving IV cefazolin through a PICC line  -- Repeat blood cultures are negative  -- Antibiotics changed to IV nafcillin every 4 hours  -- No evidence of peripheral eosinophilia on the CBC differential   We will check a urine analysis    Chronic diastolic congestive heart failure  -- Preserved ejection fraction of 60% with severe aortic stenosis    Chronic atrial fibrillation  -- Status post pacemaker placement  -- Anticoagulation with Xarelto on hold  -- Concern for possible PPM extraction due to bacteremia      SUMMARY OF RECOMMENDATIONS:    Please see above    HISTORY OF PRESENT ILLNESS:  Requesting Physician: Camelia Brannon, *  Reason for Consult: Acute kidney injury with underlying chronic kidney disease    Jackie Pugh is a 80 y o  male who was admitted to [address] [place] [address] after presenting with altered mental status and multiple falls  A renal consultation is requested today for assistance in the management of acute kidney injury and need of a contrast study  Patient was recently discharged from the hospital back in December where he had been treated for MSSA bacteremia and was discharged on IV cefazolin  He initially presented to ER after fall and his creatinine was stable at 1 5 and then discharged only to return again for more falls and altered mental status and confusion  He was admitted on January 12  His creatinine was at his baseline    He underwent a CT scan with intravenous contrast   There was concern of brain lesion seen on imaging concerning for underlying abscess  And requiring a contrast study to further evaluate this  His renal function has subsequently worsened over the last 48 to 72 hours  His creatinine is now up to 2 2 mg/dL  He follows my colleague as an outpatient where his baseline creatinine fluctuate primarily in the mid ones  CT scan showed no evidence of hydronephrosis  He did have some mild pulmonary edema and was on oral diuretic      PAST MEDICAL HISTORY:  Past Medical History:   Diagnosis Date   • Arthritis    • Atrial flutter ([address] )    • Chronic kidney disease     stage 3   • Coronary artery disease     2 stents   • Fluid retention    • Gout    • Heart disease    • Heart failure (HCC)     pacemaker   • Hypertension    • Hyponatremia 04/08/2019   • Neurological disorder    • Pacemaker    • Pulmonary emphysema ([address] )    • Radiculopathy     last assessed 1/28/16    • Shortness of breath     exertion   • Sleep apnea     c pap       PAST SURGICAL HISTORY:  Past Surgical History:   Procedure Laterality Date   • ANGIOPLASTY      x2 2 stents and then replaced   • CARDIAC PACEMAKER PLACEMENT      pacemaker permanent placement dual chamber / last assessed 4/7/14 / implantation    • CARDIAC SURGERY      pacemaker   • CHOLECYSTECTOMY     • CORONARY ANGIOPLASTY WITH STENT PLACEMENT     • EPIDURAL BLOCK INJECTION N/A 05/26/2016    Procedure: BLOCK / INJECTION EPIDURAL STEROID LUMBAR  L4-5;  Surgeon: Tristen Garcia MD;  Location: [facility] MAIN OR;  Service:    • EPIDURAL BLOCK INJECTION N/A 02/14/2019    Procedure: L4 L5 Lumbar Epidural Steroid Injection;  Surgeon: Tristen Garcia MD;  Location: [facility] MAIN OR;  Service: Pain Management    • EYE SURGERY      cataract left   • HAND SURGERY     • HIP PINNING Left    • INSERT / REPLACE / REMOVE PACEMAKER     • IR TUNNELED CENTRAL LINE PLACEMENT  12/12/2022   • KNEE ARTHROSCOPY W/ MENISCAL REPAIR Left    • KNEE SURGERY     • LUMBAR EPIDURAL INJECTION N/A 03/17/2016    Procedure: BLOCK / INJECTION LUMBAR L4-5  (C-ARM); Surgeon: Luis Eduardo Bauman MD;  Location: [facility] MAIN OR;  Service:    • NE OPTX FEM SHFT FX W/INSJ IMED IMPLT W/WO SCREW Right 2022    Procedure: INSERTION NAIL IM FEMUR ANTEGRADE (TROCHANTERIC);   Surgeon: Jonathan Andujar MD;  Location: [facility];  Service: Orthopedics       ALLERGIES:  No Known Allergies    SOCIAL HISTORY:  Social History     Substance and Sexual Activity   Alcohol Use Never     Social History     Substance and Sexual Activity   Drug Use No     Social History     Tobacco Use   Smoking Status Every Day   • Packs/day: 0 25   • Years: 50 00   • Pack years: 12 50   • Types: Cigarettes   • Start date: 2022   Smokeless Tobacco Former   Tobacco Comments    quit 2021       FAMILY HISTORY:  Family History   Problem Relation Age of Onset   • Cancer Mother 80        Cancer when 80 yrs old   • Heart disease Mother    • Hypertension Mother    • Heart disease Father    • Cancer Father         Heart   46   • Diabetes Neg Hx    • Stroke Neg Hx        MEDICATIONS:    Current Facility-Administered Medications:   •  acetaminophen (TYLENOL) tablet 650 mg, 650 mg, Oral, Q6H PRN, [place] Robin, DO, 650 mg at 01/15/23 0221  •  acetylcysteine (MUCOMYST) 200 mg/mL inhalation solution 600 mg, 3 mL, Nebulization, TID, [place] Robin, DO, 600 mg at 23 4949  •  allopurinol (ZYLOPRIM) tablet 200 mg, 200 mg, Oral, Daily, [place] Robin, DO, 200 mg at 23 0911  •  docusate sodium (COLACE) capsule 100 mg, 100 mg, Oral, BID, [place] Robin, DO, 100 mg at 23 0911  •  DULoxetine (CYMBALTA) delayed release capsule 60 mg, 60 mg, Oral, BID, [place] Robin, DO, 60 mg at 23 0911  •  finasteride (PROSCAR) tablet 5 mg, 5 mg, Oral, Daily, [place] Robin, DO, 5 mg at 23 0911  •  fluticasone-vilanterol 200-25 mcg/actuation 1 puff, 1 puff, Inhalation, Daily, [place] Robin, DO, 1 puff at 23 1000  •  levalbuterol (XOPENEX) inhalation solution 1 25 mg, 1 25 mg, Nebulization, TID, Vince Alvarado Wu, DO, 1 25 mg at 01/17/23 0748  •  nafcillin (NALLPEN) 2,000 mg in sodium chloride 0 9 % 100 mL IVPB, 2,000 mg, Intravenous, Q4H, Adonica Chew, DO, Last Rate: 100 mL/hr at 01/17/23 0753, 2,000 mg at 01/17/23 0753  •  pantoprazole (PROTONIX) EC tablet 40 mg, 40 mg, Oral, Early Morning, Adonica Chew, DO, 40 mg at 01/17/23 6455  •  polyethylene glycol (MIRALAX) packet 17 g, 17 g, Oral, Daily, Adonica Chew, DO, 17 g at 01/17/23 0911  •  potassium chloride 20 mEq IVPB (premix), 20 mEq, Intravenous, Q2H, NAVA Gleason  •  saccharomyces boulardii (FLORASTOR) capsule 250 mg, 250 mg, Oral, BID, Adonica Chew, DO, 250 mg at 01/17/23 0911  •  senna (SENOKOT) tablet 8 6 mg, 8 6 mg, Oral, HS, Adonica Chew, DO, 8 6 mg at 01/15/23 2105  •  sodium chloride (PF) 0 9 % injection 10 mL, 10 mL, Intravenous, Q8H [address], Adonica Chew, DO, 10 mL at 01/16/23 0529  •  umeclidinium 62 5 mcg/actuation inhaler AEPB 1 puff, 1 puff, Inhalation, Daily, Adonica Chew, DO, 1 puff at 01/17/23 0958    REVIEW OF SYSTEMS:  Constitutional: Positive for fatigue, decreased oral intake  Confusion  HENT: Negative for postnasal drip  Eyes: Negative for visual disturbance  Respiratory: Negative for cough, shortness of breath and wheezing  Cardiovascular: Negative for chest pain, palpitations and leg swelling  Gastrointestinal: Negative for abdominal pain, constipation, diarrhea, nausea and vomiting  Genitourinary: No dysuria, hematuria  Endocrine: Negative for polyuria  Musculoskeletal: Negative for arthralgias, back pain and joint swelling  Skin: Negative for rash  Neurological: Negative for focal weakness, headaches, dizziness  Hematological: Negative for easy bruising or bleeding  Psychiatric/Behavioral: Negative for confusion and sleep disturbance  All the systems were reviewed and were negative except as documented on the HPI      PHYSICAL EXAM:  Current Weight: Weight - Scale: 107 kg (236 lb 9 6 oz)  First Weight: Weight - Scale: 108 kg (238 lb 12 1 oz)  Vitals:    01/16/23 2025 01/16/23 2213 01/17/23 0600 01/17/23 0608   BP:  138/72  112/78   Pulse:  92  86   Resp:  18  19   Temp:  99 1 °F (37 3 °C)  98 2 °F (36 8 °C)   TempSrc:       SpO2: 94% 96%  93%   Weight:   107 kg (236 lb 9 6 oz)    Height:           Intake/Output Summary (Last 24 hours) at 1/17/2023 1053  Last data filed at 1/17/2023 0900  Gross per 24 hour   Intake 596 ml   Output --   Net 596 ml     Physical Exam  Vitals and nursing note reviewed  Exam conducted with a chaperone present  Constitutional:       General: He is not in acute distress  Appearance: He is well-developed  HENT:      Head: Normocephalic and atraumatic  Eyes:      General: No scleral icterus  Conjunctiva/sclera: Conjunctivae normal       Pupils: Pupils are equal, round, and reactive to light  Cardiovascular:      Rate and Rhythm: Normal rate and regular rhythm  Heart sounds: S1 normal and S2 normal  No murmur heard  No friction rub  No gallop  Pulmonary:      Effort: Pulmonary effort is normal  No respiratory distress  Breath sounds: Rhonchi and rales present  No wheezing  Abdominal:      General: Bowel sounds are normal       Palpations: Abdomen is soft  Tenderness: There is no abdominal tenderness  There is no rebound  Musculoskeletal:         General: Normal range of motion  Cervical back: Normal range of motion and neck supple  Right lower leg: Edema present  Left lower leg: Edema present  Skin:     Findings: No rash  Neurological:      Mental Status: He is alert and oriented to person, place, and time     Psychiatric:         Behavior: Behavior normal            Invasive Devices:      Lab Results:   Results from last 7 days   Lab Units 01/17/23  0512 01/16/23  0534 01/15/23  0557 01/14/23  1339 01/14/23  0549 01/13/23  0605 01/12/23  0427   WBC Thousand/uL 8 14 7 69 8 97   < >  --  6 87 7 91   HEMOGLOBIN g/dL 9 2* 8 7* 8 4*   < >  -- 8  3* 8 3*   HEMATOCRIT % 30 4* 30 4* 29 2*   < >  --  27 3* 27 2*   PLATELETS Thousands/uL 229 207 201   < >  --  202 190   POTASSIUM mmol/L 2 9* 3 1* 3 6  --  3 3* 3 2* 4 0   CHLORIDE mmol/L 97 97 97  --  100 98 96   CO2 mmol/L 33* 34* 31  --  31 32 29   BUN mg/dL 25 25 26*  --  20 18 18   CREATININE mg/dL 2 20* 2 01* 2 07*  --  1 69* 1 55* 1 58*   CALCIUM mg/dL 8 8 8 5 8 4  --  8 5 8 5 8 5   MAGNESIUM mg/dL  --   --  1 9  --   --  2 1 2 0   ALK PHOS U/L  --   --  91  --  70 68  --    ALT U/L  --   --  <6*  --  <6* <6*  --    AST U/L  --   --  17  --  15 16  --     < > = values in this interval not displayed

## 2023-01-17 NOTE — PROGRESS NOTES
Progress Note - Electrophysiology-Cardiology (EP)   Estrella Car 80 y o  male MRN: [id number]  Unit/Bed#: [facility] 606-01 Encounter: [id number]      Assessment/Plan:  1  MSSA bacteremia  a  Last blood cultures positive on 12/5/22, otherwise negative on 12/7,12/24,1/13  b  ID following, recommending EP eval for pacemaker removal  c  On IV nafcillin  2  Brain lesion, probable abscesses  a  Unable to obtain MRI due to incompatible pacemaker  b  Holding anticoagulation  c  CT head this week to monitor intracranial collections, pending improvement in renal function  d  Patient not competent on exam  3  Metabolic encephalopathy  4  Pulmonary septic emboli  5  Presence of [employer] dual chamber pacemaker  a  RA/RV leads implanted 3/22/2012  b  Generator change 9/16/2022  c   30% at 60 bpm  d  MARY 12/7/22 - no evidence of vegetation  6  Permanent atrial fibrillation  a  Previously on Xarelto - currently held  b  Not on rate control as outpatient  7  Severe AS  8  Hypokalemia, K 2 9  9  SHAWNEE on CKD 3  a  Nephrology following - recommend holding off on contrast studies until renal function improves  10  COPD  6  Chronic diastolic heart failure  12  CAD s/p LEOBARDO      Device interrogation shows permanent afib with 30%  at a rate of 60 bpm  Rate dropped to 40 bpm to assess pacing burden  It is likely this patient would not need pacemaker reimplant if device were removed  Daughter agreeable to undergo pacemaker extraction  Will await results of CT head to ensure no hemorrhage  Non-contrast CT head scheduled for Thursday morning, per neurosurgery  If cleared from a hemorrhage standpoint, will probably proceed with pacemaker extraction on Friday  Will need to discuss whether or not we can resume anticoagulation to determine if a CT surgery consult is needed prior to device extraction  Nephrology following for SHAWNEE on CKD   Recommend holding off on contrast studies and procedures until Cr improves  Subjective/Objective     Subjective: Upon evaluation, patient is resting comfortably in bed  Daughter is present bedside  Unable to obtain ROS  Patient mental status appears improved, although daughter states he waxes and wanes          Objective:     Vitals: /78   Pulse 86   Temp 98 2 °F (36 8 °C)   Resp 19   Ht 6' 2" (1 88 m)   Wt 107 kg (236 lb 9 6 oz)   SpO2 93%   BMI 30 38 kg/m²   Vitals:    01/16/23 0536 01/17/23 0600   Weight: 108 kg (238 lb 12 1 oz) 107 kg (236 lb 9 6 oz)     Orthostatic Blood Pressures    Flowsheet Row Most Recent Value   Blood Pressure 112/78 filed at 01/17/2023 8888            Intake/Output Summary (Last 24 hours) at 1/17/2023 0849  Last data filed at 1/16/2023 1826  Gross per 24 hour   Intake 476 ml   Output --   Net 476 ml       Invasive Devices     Central Venous Catheter Line  Duration           CVC Central Lines 12/12/22 Single Right Subclavian 35 days          Drain  Duration           External Urinary Catheter Small 6 days                            Scheduled Meds:  Current Facility-Administered Medications   Medication Dose Route Frequency Provider Last Rate   • acetaminophen  650 mg Oral Q6H PRN Beryle Knock, DO     • acetylcysteine  3 mL Nebulization TID Beryle Knock, DO     • allopurinol  200 mg Oral Daily Beryle Knock, DO     • docusate sodium  100 mg Oral BID Beryle Knock, DO     • DULoxetine  60 mg Oral BID Beryle Knock, DO     • finasteride  5 mg Oral Daily Beryle Knock, DO     • fluticasone-vilanterol  1 puff Inhalation Daily Beryle Knock, DO     • levalbuterol  1 25 mg Nebulization TID Beryle Knock, DO     • nafcillin  2,000 mg Intravenous Q4H Beryle Knock, DO 2,000 mg (01/17/23 0753)   • pantoprazole  40 mg Oral Early Morning Beryle Knock, DO     • polyethylene glycol  17 g Oral Daily Beryle Knock, DO     • potassium chloride  40 mEq Oral Daily Adron Kiesha     • saccharomyces boulardii  250 mg Oral BID Beryle Knock, DO     • senna  8 6 mg Oral HS Levora [place], DO     • sodium chloride (PF)  10 mL Intravenous [facility] Levora [place], DO     • torsemide  40 mg Oral Daily Levora [place], DO     • umeclidinium  1 puff Inhalation Daily Levora [place], DO       Continuous Infusions:   PRN Meds: •  acetaminophen    Review of Systems:  ROS as noted above, otherwise 12 point review of systems was performed and is negative  Physical Exam:   Physical Exam  Vitals reviewed  Constitutional:       General: He is not in acute distress  Appearance: He is not ill-appearing or diaphoretic  HENT:      Head: Normocephalic and atraumatic  Right Ear: External ear normal       Left Ear: External ear normal       Nose: Nose normal    Eyes:      General:         Right eye: No discharge  Left eye: No discharge  Cardiovascular:      Rate and Rhythm: Normal rate  Rhythm irregular  Heart sounds: No murmur heard  No friction rub  Pulmonary:      Effort: Pulmonary effort is normal       Breath sounds: Normal breath sounds  No wheezing, rhonchi or rales  Abdominal:      General: There is no distension  Palpations: Abdomen is soft  Tenderness: There is no abdominal tenderness  Musculoskeletal:         General: No deformity or signs of injury  Cervical back: No rigidity  No muscular tenderness  Right lower leg: No edema  Left lower leg: No edema  Skin:     General: Skin is warm and dry  Capillary Refill: Capillary refill takes less than 2 seconds  Coloration: Skin is not jaundiced or pale  Neurological:      Mental Status: He is alert  Psychiatric:         Mood and Affect: Mood normal          Cognition and Memory: Cognition is impaired  Lab Results: I have personally reviewed pertinent lab results      Results from last 7 days   Lab Units 01/17/23  0512 01/16/23  0534 01/15/23  0557   WBC Thousand/uL 8 14 7 69 8 97   HEMOGLOBIN g/dL 9 2* 8 7* 8 4*   HEMATOCRIT % 30 4* 30 4* 29 2* PLATELETS Thousands/uL 229 207 201     Results from last 7 days   Lab Units 01/17/23  0512 01/16/23  0534 01/15/23  0557   POTASSIUM mmol/L 2 9* 3 1* 3 6   CHLORIDE mmol/L 97 97 97   CO2 mmol/L 33* 34* 31   BUN mg/dL 25 25 26*   CREATININE mg/dL 2 20* 2 01* 2 07*   CALCIUM mg/dL 8 8 8 5 8 4         Results from last 7 days   Lab Units 01/15/23  0557 01/13/23  0605 01/12/23  0427   MAGNESIUM mg/dL 1 9 2 1 2 0         Imaging: I have personally reviewed pertinent films in PACS  ECHO: 1/12/23  •  Left Ventricle: Left ventricular cavity size is normal  Wall thickness is severely increased  There is severe concentric hypertrophy  The left ventricular ejection fraction is 55% by visual estimation  Systolic function is normal  Although no diagnostic regional wall motion abnormality was identified, this possibility cannot be completely excluded on the basis of this study  Unable to assess diastolic function  •  IVS: There is abnormal septal motion consistent with right ventricular pacing  •  Right Ventricle: Systolic function is mildly reduced  A pacer wire is present  •  Left Atrium: The atrium is severely dilated  •  Right Atrium: The atrium is mildly dilated  •  Aortic Valve: The aortic valve is trileaflet  The leaflets are severely thickened  The leaflets are severely calcified  There is severely reduced mobility  There is mild regurgitation  There is severe stenosis  The aortic valve peak velocity is 4 12 m/s  The aortic valve peak gradient is 68 mmHg  The aortic valve mean gradient is 35 mmHg  The dimensionless velocity index is 0 28   •  Mitral Valve: There is moderate diffuse thickening of the anterior leaflet and posterior leaflet  There is mild calcification  There is moderately reduced mobility of the posterior leaflet  There is moderate annular calcification  There is mild to moderate regurgitation  There is mild to moderate stenosis          VTE Pharmacologic Prophylaxis: Reason for no pharmacologic prophylaxis brain lesion  VTE Mechanical Prophylaxis: sequential compression device

## 2023-01-17 NOTE — PLAN OF CARE
Problem: PHYSICAL THERAPY ADULT  Goal: Performs mobility at highest level of function for planned discharge setting  See evaluation for individualized goals  Description: Treatment/Interventions: OT, Spoke to nursing, Bed mobility, Gait training, Patient/family training, Endurance training, LE strengthening/ROM, Functional transfer training          See flowsheet documentation for full assessment, interventions and recommendations  Outcome: Progressing  Note: Prognosis: Guarded  Problem List: Decreased strength, Decreased range of motion, Decreased endurance, Impaired balance, Decreased mobility, Decreased cognition, Impaired judgement, Decreased safety awareness, Pain  Assessment: pt continues to require Ax2 for all functional transfers, but is able to participate in all tasks & initiate all sit to stand transfers from EOB  He does fatigue quickly in static standing, maintaining stand for no greater than 30 seconds during any of the 3 trials performed during session  Pt attempted L sidestepping towards HOB, but did not possess sufficient strength & balance to do so after initially advancing LLE, requiring return to sitting prior to returning to bed  Once returned to supine, pt repositioned in bed & HOB elevated to facilitate improved respiratory status  Octavia Bathe monitor relocated in attempt to achieve accurate reading, at which time respiratory therapy arrived at RN request   Pt will continue to benefit from PT services to address functional deficits & work towards PLOF pending evolving medical status/POC  PT Discharge Recommendation: Post acute rehabilitation services    See flowsheet documentation for full assessment

## 2023-01-17 NOTE — ASSESSMENT & PLAN NOTE
On recent admission noted to have MSSA bacteremia 12/5  Likely originated from skin breakdown on arms and legs  Complicated by presence of pacemaker  TTE and MARY on prior admission negative  Had been receiving outpatient cefazolin through PICC line  Repeat blood cultures are negative however imaging concerning for ongoing pulmonary and out CNS septic emboli    • Continue IV Naficillin 2 g Q4H per infectious disease

## 2023-01-17 NOTE — ASSESSMENT & PLAN NOTE
Patient presented with progressive confusion, generalized weakness, and decreased oral intake to [address]  Transfer to [place] for neurosurgical evaluation after CT of head showed enhancing lesion within the left parasagittal parietal lobe with surrounding vasogenic edema likely abscess  • Unable to obtain MRI due to pacemaker which is not compatible per prior documentation   • ESR 56, CRP 20  • Neurosurgery following,  • Continue IV nafcillin Q4 per infectious disease  • No surgical intervention at this time, plan for CT head to evaluate for improvement on antibiotic regimen and reevaluate potential need for surgery  Timing TBD based on renal function  • Holding Xarelto in the interim until can exclude need for surgical intervention  · Patient with history of PPM, ID recommending removal but patient is high risk  Ongoing discussions with family  · Echo with Significant calcification or mitral and aortic valve  Difficult to exclude vegetation  If high clinical suspicion would need MARY  · Repeat [facility] 1/13 negative   • PT/OT recommending rehab     • Rest of plan as below

## 2023-01-17 NOTE — PROGRESS NOTES
ADT Alert via IB  Patient was admitted to IP at [facility] on 1/12/23 with confusion, generalized weakness and decreased oral intake  Patient admitted with brain lesion  OPCM RN to follow at dc

## 2023-01-17 NOTE — ASSESSMENT & PLAN NOTE
Lab Results   Component Value Date    EGFR 26 01/17/2023    EGFR 30 01/16/2023    EGFR 28 01/15/2023    CREATININE 2 20 (H) 01/17/2023    CREATININE 2 01 (H) 01/16/2023    CREATININE 2 07 (H) 01/15/2023     · Baseline creatinine 1 3-1 6, up to 2 20 today  SHAWNEE on CKD     · No hydronephrosis noted on CT A/P 1/12  · Check PVR  · Hold diuretics  · Consult nephrology given need for contrast CT  · BMP in AM

## 2023-01-17 NOTE — SPEECH THERAPY NOTE
Speech Language/Pathology    Speech/Language Pathology Progress Note    Patient Name: Ranjeet Youssef  GQQHO'H Date: 1/17/2023     Problem List  Principal Problem:    Brain lesion  Active Problems:    Chronic a-fib (HCC)    JOVI (obstructive sleep apnea)    Stage 3a chronic kidney disease (HCC)    Hypokalemia    Essential hypertension    COPD (chronic obstructive pulmonary disease) (HCC)    Chronic diastolic heart failure (HCC)    MSSA bacteremia    Metabolic encephalopathy    Hyperglycemia       Past Medical History  Past Medical History:   Diagnosis Date   • Arthritis    • Atrial flutter (HCC)    • Chronic kidney disease     stage 3   • Coronary artery disease     2 stents   • Fluid retention    • Gout    • Heart disease    • Heart failure (HCC)     pacemaker   • Hypertension    • Hyponatremia 04/08/2019   • Neurological disorder    • Pacemaker    • Pulmonary emphysema ([facility] [address] )    • Radiculopathy     last assessed 1/28/16    • Shortness of breath     exertion   • Sleep apnea     c pap        Past Surgical History  Past Surgical History:   Procedure Laterality Date   • ANGIOPLASTY      x2 2 stents and then replaced   • CARDIAC PACEMAKER PLACEMENT      pacemaker permanent placement dual chamber / last assessed 4/7/14 / implantation    • CARDIAC SURGERY      pacemaker   • CHOLECYSTECTOMY     • CORONARY ANGIOPLASTY WITH STENT PLACEMENT     • EPIDURAL BLOCK INJECTION N/A 05/26/2016    Procedure: BLOCK / INJECTION EPIDURAL STEROID LUMBAR  L4-5;  Surgeon: Zenon Grace MD;  Location: [facility] MAIN OR;  Service:    • EPIDURAL BLOCK INJECTION N/A 02/14/2019    Procedure: L4 L5 Lumbar Epidural Steroid Injection;  Surgeon: Zenon Grace MD;  Location: [facility] MAIN OR;  Service: Pain Management    • EYE SURGERY      cataract left   • HAND SURGERY     • HIP PINNING Left    • INSERT / REPLACE / REMOVE PACEMAKER     • IR TUNNELED CENTRAL LINE PLACEMENT  12/12/2022   • KNEE ARTHROSCOPY W/ MENISCAL REPAIR Left    • KNEE SURGERY     • LUMBAR EPIDURAL INJECTION N/A 03/17/2016    Procedure: BLOCK / INJECTION LUMBAR  L4-5  (C-ARM); Surgeon: Merari Couch MD;  [place] [facility] [place];  Service:    • ID OPTX FEM SHFT FX W/INSJ IMED IMPLT W/WO SCREW Right 01/31/2022    Procedure: INSERTION NAIL IM FEMUR ANTEGRADE (TROCHANTERIC); Surgeon: Jaya Russell MD;  Location: [facility] Main OR;  Service: Orthopedics         Subjective:  "That's some good water" Patient awake and alert  Objective: The patient is seen for dysphagia therapy at lunch meal  Patient already had ~25% of level 3 solids and refuses more of hot meal  The patient is agreeable to rice pudding and thin liquids  SLP feeds patient  Oral closure for tsp is adequate  Transfer is timely and efficient  The patient takes small, consecutive sips of thin liquids via straw  No overt s/s aspiration observed  However, patient is observed with congested cough at rest and outside of PO trials  Patient is on fluid restriction and SLP is unable to assess with further sips of thin liquids  Assessment:  Patient has congested cough outside of PO trials  Tolerated puree dessert and thin liquids well  Plan/Recommendations:  Continue level 3 diet with thin liquids  Continue ST to further assess tolerance and trial upgrades  May consider VBS to r/o aspiration if indicated

## 2023-01-18 PROBLEM — N17.9 AKI (ACUTE KIDNEY INJURY) (HCC): Status: ACTIVE | Noted: 2019-04-18

## 2023-01-18 PROBLEM — J96.00 ACUTE RESPIRATORY FAILURE (HCC): Status: ACTIVE | Noted: 2019-04-11

## 2023-01-18 NOTE — ASSESSMENT & PLAN NOTE
Now requiring 2L  · CXR with moderate right pleural effusion  · Consult IR for right thoracentesis     · Will send labs   · Wean oxygen as tolerated

## 2023-01-18 NOTE — PROGRESS NOTES
Follow up Consultation    Nephrology   Vicente Carlton 80 y o  male MRN: [id number]  Unit/Bed#: [facility] 606-01 Encounter: [id number]      Physician Requesting Consult: Yessica Brannon, *        ASSESSMENT/PLAN:   80-year-old male with multiple committees including COPD, CAD, BPH, A  fib, recent MSSA bacteremia and CKD stage III admitted with worsening mental status and decreased p o  intake  Nephrology following for acute kidney injury  Acute kidney injury on CKD stage III:  SHAWNEE multifactorial most likely secondary to inflammatory changes secondary to infection with some component of prerenal azotemia due to decreased p o  intake plus MICAH (1/12) and subsequent ATN  After review of records In [facility] as well as Care everywhere it appears that the patient has a baseline Creatinine of 1 5-1 8 mg/dL  patient was admitted with a creatinine of 1 42 mg/dL on 1/11/2023  patient's creatinine today is at 2 85 mg/dL, unfortunately continues to rise  Hold further IV fluids for now  Recommend holding diuretics for now  Low threshold for in initiating IV fluids  Clinically euvolemic to minimally hypovolemic  CT from 1/12 no hydronephrosis nonobstructing calculi on the left  check BMP in a m  Await renal recovery  Optimize hemodynamic status to avoid delay in renal recovery  Place on a renal diet when allowed diet order  Avoid nephrotoxins, adjust meds to appropriate GFR  Strict I/O  Daily weights  Urinary retention protocol if patient does not have a Roca  Most likely has underlying CKD secondary to obesity hyperfiltration plus age-related nephron loss plus hypertensive sclerosis  will need to set up patient for follow up with Nephrology as an outpatient post hospitalization  for nephrology as an outpatient patient follows up with Dr Diane Sanchez    Blood pressure/normotensive:  current medications: None  recommendations: No changes  Optimize hemodynamics    Maintain MAP > 65mmHg  Avoid BP fluctuations  H/H/anemia:  most recent hemoglobin at 8 8 g/dL  maintain hemoglobin greater than 8 grams/deciliter  As per primary team    Acid-base electrolytes:    Electrolytes:      Hypokalemia:  Most recent potassium 3 5 mEq  Stable and resolved    Acid-base:    Most recent bicarb at 33 stable    Other medical problems:  Proteinuria: Most recent protein creatinine ratio from  54 mg most recent UA from 2023 no blood or protein  Brain lesion/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: Concern for abscess management primary team  CHF:  Management per primary team   Recent echo with EF of 60%  MSSA bacteremia/septic pulmonary emboli:  Management per primary team   Currently on nafcillin previously on cefazolin  No signs suggestive of AIN at this time  Concern for possible PPM extraction due to bacteremia  Pleural effusion: Recommend thoracentesis discussed with primary team    Thanks for the consult  Will continue to follow  Please call with questions/ concerns  Above-mentioned orders and Plan in terms of acute kidney injury management and volume status was discussed with the team in depth with Tiger text    Etheleen Kawasaki, MD, FASN, 2023, 11:37 AM              Objective :   Patient seen and examined in his room no overnight events hemodynamically stable remains afebrile urine output not adequately documented      PHYSICAL EXAM  /74   Pulse 93   Temp 99 2 °F (37 3 °C)   Resp 18   Ht 6' 2" (1 88 m)   Wt 107 kg (236 lb 8 oz)   SpO2 97%   BMI 30 36 kg/m²   Temp (24hrs), Av 6 °F (37 6 °C), Min:99 2 °F (37 3 °C), Max:100 °F (37 8 °C)        Intake/Output Summary (Last 24 hours) at 2023 1137  Last data filed at 2023 0900  Gross per 24 hour   Intake 480 ml   Output --   Net 480 ml       I/O last 24 hours:   In: 600 [P O :600]  Out: -       Current Weight: Weight - Scale: 107 kg (236 lb 8 oz)  First Weight: Weight - Scale: 108 kg (238 lb 12 1 oz)  Physical Exam  Vitals and nursing note reviewed  Constitutional:       General: He is not in acute distress  Appearance: Normal appearance  He is obese  He is ill-appearing  He is not toxic-appearing or diaphoretic  HENT:      Head: Normocephalic and atraumatic  Mouth/Throat:      Mouth: Mucous membranes are dry  Pharynx: Oropharynx is clear  No oropharyngeal exudate  Eyes:      General: No scleral icterus  Conjunctiva/sclera: Conjunctivae normal    Cardiovascular:      Rate and Rhythm: Normal rate  Pulses: Normal pulses  Pulmonary:      Effort: Pulmonary effort is normal       Breath sounds: No stridor  Comments: Coarse breath sounds decreased on the right  Abdominal:      General: There is no distension  Palpations: Abdomen is soft  There is no mass  Tenderness: There is no abdominal tenderness  Musculoskeletal:         General: No swelling  Cervical back: Normal range of motion  No rigidity  Skin:     General: Skin is warm  Coloration: Skin is not jaundiced  Neurological:      General: No focal deficit present  Mental Status: He is alert  Psychiatric:         Mood and Affect: Mood normal          Behavior: Behavior normal              Review of Systems   Constitutional: Negative for chills and fatigue  HENT: Negative for congestion  Respiratory: Positive for cough  Negative for shortness of breath  Cardiovascular: Negative for leg swelling  Gastrointestinal: Negative for abdominal pain, constipation and diarrhea  Genitourinary: Negative for dysuria  Musculoskeletal: Negative for back pain  Neurological: Negative for headaches  Psychiatric/Behavioral: Negative for agitation  All other systems reviewed and are negative        Scheduled Meds:  Current Facility-Administered Medications   Medication Dose Route Frequency Provider Last Rate   • acetaminophen  650 mg Oral Q6H PRN Kenya Cochran DO     • acetylcysteine  3 mL Nebulization TID Kenya Cochran DO • allopurinol  200 mg Oral Daily Kenya Cochran, DO     • docusate sodium  100 mg Oral BID Kenya Cochran, DO     • DULoxetine  60 mg Oral BID Kenya Cochran DO     • finasteride  5 mg Oral Daily Kenya Cochran, DO     • fluticasone-vilanterol  1 puff Inhalation Daily Kenya Cochran DO     • levalbuterol  1 25 mg Nebulization TID Kenya Cochran DO     • nafcillin  2,000 mg Intravenous Q4H Kenya Cochran DO 2,000 mg (01/18/23 3029)   • pantoprazole  40 mg Oral Early Morning Kenya Cochran DO     • polyethylene glycol  17 g Oral Daily Kenya Cochran DO     • senna  8 6 mg Oral HS Kenya Cochran DO     • sodium chloride (PF)  10 mL Intravenous [facility] Kenya Cochran, DO     • umeclidinium  1 puff Inhalation Daily Kenya Cochran DO         PRN Meds: •  acetaminophen    Continuous Infusions:       Invasive Devices: Invasive Devices     Central Venous Catheter Line  Duration           CVC Central Lines 12/12/22 Single Right Subclavian 36 days          Drain  Duration           External Urinary Catheter Small 7 days                  LABORATORY:    Results from last 7 days   Lab Units 01/18/23  0442 01/17/23  0512 01/16/23  0534 01/15/23  0557 01/14/23  1339 01/14/23  0549 01/13/23  0605 01/12/23  0427   WBC Thousand/uL 12 00* 8 14 7 69 8 97 8 05  --  6 87 7 91   HEMOGLOBIN g/dL 8 8* 9 2* 8 7* 8 4* 9 1*  --  8 3* 8 3*   HEMATOCRIT % 29 8* 30 4* 30 4* 29 2* 30 5*  --  27 3* 27 2*   PLATELETS Thousands/uL 221 229 207 201 195  --  202 190   POTASSIUM mmol/L 3 5 2 9* 3 1* 3 6  --  3 3* 3 2* 4 0   CHLORIDE mmol/L 97 97 97 97  --  100 98 96   CO2 mmol/L 33* 33* 34* 31  --  31 32 29   BUN mg/dL 30* 25 25 26*  --  20 18 18   CREATININE mg/dL 2 85* 2 20* 2 01* 2 07*  --  1 69* 1 55* 1 58*   CALCIUM mg/dL 8 8 8 8 8 5 8 4  --  8 5 8 5 8 5   MAGNESIUM mg/dL 1 8  --   --  1 9  --   --  2 1 2 0      rest all reviewed    RADIOLOGY:  XR chest portable   Final Result by Yael Chauhan MD (01/18 0901)      Left lung nodules better shown on CT  Persistent moderate right effusion and right base atelectasis  Workstation performed: [id number]         CT head wo contrast    (Results Pending)   IR IN-Patient Thoracentesis    (Results Pending)     Rest all reviewed    Portions of the record may have been created with voice recognition software  Occasional wrong word or "sound a like" substitutions may have occurred due to the inherent limitations of voice recognition software  Read the chart carefully and recognize, using context, where substitutions have occurred  If you have any questions, please contact the dictating provider

## 2023-01-18 NOTE — ASSESSMENT & PLAN NOTE
S/p pacemaker placement, not maintained on any rate controlling medications at this time  • Holding Xarelto for now per neurosurgery   • EP following, possible PPM extraction Friday

## 2023-01-18 NOTE — PROGRESS NOTES
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  In setting of recent MSSA bacteremia  Progressive encephalopathy and radiographic changes despite cefazolin, possible due to poor CNS penetration  Consider endocarditis  Current blood cultures negative  Initial MARY  with heavy AV/MV calcification, unable to exclude vegetation  TTE  technically difficult  Rec:  • Continue Nafcillin for now  • Check repeat CT head later this week  • Follow mental status closely     Septic pulmonary emboli  In setting of bacteremia as above  Interval enlargement since last CT   Consider PPM infection  Rec:  • Continue antibiotics as above  • Possible PPM extraction later this week     Recent MSSA bacteremia  Diagnosed 22, thought due to skin source in setting of multiple wounds  Consider endocarditis versus PPM infection  Initial MARY  with heavy AV/MV calcification, unable to exclude vegetation  Rec:  • Continue antibiotics as above     Acute encephalopathy  Likely multifactorial due to infection as above, worsening SHAWNEE      PPM   Placed       SHAWNEE on CKD  Baseline Cr 1 3-1 6  Worse  Rec:  · Recheck BMP in AM  · No dose-adjustment indicated for Nafcillin  · Close Nephrology follow-up ongoing     COPD      Chronic CHF      Antibiotics:  Nafcillin #8  Antibiotics #43    Subjective:  Patient seen on AM rounds  Very confused, offers limited ROS  24 Hour Events:  Course upper airway sounds despite deep suctioning per RT  Low-grade temps  No documented fevers, chills, sweats, nausea, vomiting, or diarrhea      Objective:  Vitals:  Temp:  [99 5 °F (37 5 °C)-100 °F (37 8 °C)] 100 °F (37 8 °C)  HR:  [96-97] 96  Resp:  [18-20] 18  BP: (114-123)/(72-77) 123/72  SpO2:  [95 %-98 %] 96 %  Temp (24hrs), Av 7 °F (37 6 °C), Min:99 5 °F (37 5 °C), Max:100 °F (37 8 °C)  Current: Temperature: 100 °F (37 8 °C)    Physical Exam:   General:  No acute distress  Psychiatric:  Awake and alert  Pulmonary:  Coarse upper airway sounds, CTA at bases  Abdomen:  Soft, nontender  Extremities:  No edema  Skin:  No rashes    Lab Results:  I have personally reviewed pertinent labs  Results from last 7 days   Lab Units 01/18/23  0442 01/17/23  0512 01/16/23  0534 01/15/23  0557 01/14/23  0549   POTASSIUM mmol/L 3 5 2 9* 3 1* 3 6 3 3*   CHLORIDE mmol/L 97 97 97 97 100   CO2 mmol/L 33* 33* 34* 31 31   BUN mg/dL 30* 25 25 26* 20   CREATININE mg/dL 2 85* 2 20* 2 01* 2 07* 1 69*   EGFR ml/min/1 73sq m 19 26 30 28 37   CALCIUM mg/dL 8 8 8 8 8 5 8 4 8 5   AST U/L 16  --   --  17 15   ALT U/L <6*  --   --  <6* <6*   ALK PHOS U/L 62  --   --  91 70     Results from last 7 days   Lab Units 01/18/23  0442 01/17/23  0512 01/16/23  0534   WBC Thousand/uL 12 00* 8 14 7 69   HEMOGLOBIN g/dL 8 8* 9 2* 8 7*   PLATELETS Thousands/uL 221 229 207     Results from last 7 days   Lab Units 01/13/23  0105   BLOOD CULTURE  No Growth After 5 Days  No Growth After 5 Days  Imaging Studies:   I have personally reviewed pertinent imaging study reports and images in PACS  EKG, Pathology, and Other Studies:   I have personally reviewed pertinent reports

## 2023-01-18 NOTE — RESTORATIVE TECHNICIAN NOTE
Restorative Technician Note      Patient Name: Owen Hendrix     Restorative Tech Visit Date: 01/18/23  Note Type: Mobility  Patient Position Upon Consult: Supine  Activity Performed: Transferred  Assistive Device: Other (Comment) (slide board)  Patient Position at End of Consult: Supine;  All needs within reach; Bed/Chair alarm activated    Amaury Oliver Restorative Technician

## 2023-01-18 NOTE — ASSESSMENT & PLAN NOTE
Wt Readings from Last 3 Encounters:   01/18/23 107 kg (236 lb 8 oz)   01/12/23 112 kg (246 lb)   01/03/23 113 kg (249 lb)     · Recent cardiac echo with EF 60% and severe AS  · Holding diuretic (torsemide 40 mg daily) in setting of SHAWNEE     · Low NA diet with FR  · Monitor volume status closely

## 2023-01-18 NOTE — SEDATION DOCUMENTATION
Right side thoracentesis performed by Augustina Bocanegra PA-C  850 ml of serosanguinous fluid drained  Labs were ordered  Ultrasound guided  Pt tolerated well  Puncture site is CDI and covered with a band aid  Report called to receiving RN

## 2023-01-18 NOTE — PLAN OF CARE
Problem: PHYSICAL THERAPY ADULT  Goal: Performs mobility at highest level of function for planned discharge setting  See evaluation for individualized goals  Description: Treatment/Interventions: OT, Spoke to nursing, Bed mobility, Gait training, Patient/family training, Endurance training, LE strengthening/ROM, Functional transfer training          See flowsheet documentation for full assessment, interventions and recommendations  Outcome: Not Progressing  Note: Prognosis: Guarded  Problem List: Decreased strength, Decreased range of motion, Decreased endurance, Impaired balance, Decreased mobility, Decreased cognition, Impaired judgement, Decreased safety awareness, Pain  Assessment: The patient was found slid down in bed with his legs flexed and his shoulders where his hips should be  He was brought into sitting with significant effort  After a prolonged time sitting in order to recover standing trials were performed  Even with near dependent assistance he was only able to briefly stand  While he did attempt to assist, he was unable to motor plan appropriately which necessitated blocking his feet onto the floor, his knees, and lifting his hips  He also required dependent trunk support for each trial  Due to fatigue the return to supine was near dependent  The patient was positioned in the cardiac chair position with all needs within reach, the blinds and television on, and the alarm active  Barriers to Discharge: Inaccessible home environment, Decreased caregiver support     PT Discharge Recommendation: Post acute rehabilitation services    See flowsheet documentation for full assessment

## 2023-01-18 NOTE — PHYSICAL THERAPY NOTE
Physical Therapy Progress Note     01/18/23 1055   PT Last Visit   PT Visit Date 01/18/23   Note Type   Note Type Treatment   Pain Assessment   Pain Assessment Tool FLACC   Pain Rating: FLACC (Rest) - Face 0   Pain Rating: FLACC (Rest) - Legs 0   Pain Rating: FLACC (Rest) - Activity 0   Pain Rating: FLACC (Rest) - Cry 0   Pain Rating: FLACC (Rest) - Consolability 0   Score: FLACC (Rest) 0   Pain Rating: FLACC (Activity) - Face 1   Pain Rating: FLACC (Activity) - Legs 0   Pain Rating: FLACC (Activity) - Activity 0   Pain Rating: FLACC (Activity) - Cry 0   Pain Rating: FLACC (Activity) - Consolability 0   Score: FLACC (Activity) 1   Restrictions/Precautions   Other Precautions Cognitive; Chair Alarm; Bed Alarm; Fall Risk;Pain  (Alarm active post session )   Subjective   Subjective The patient responsive with increased time, and he follows some instructions  Bed Mobility   Supine to Sit 3  Moderate assistance   Additional items Assist x 2; Increased time required;Verbal cues;LE management;HOB elevated   Sit to Supine 3  Moderate assistance   Additional items Assist x 2; Increased time required;Verbal cues;LE management   Transfers   Sit to Stand 2  Maximal assistance   Additional items Assist x 2; Increased time required;Verbal cues  (Knees blocked and modified hammock technique )   Stand to Sit 2  Maximal assistance   Additional items Assist x 2; Increased time required;Verbal cues   Ambulation/Elevation   Gait pattern Not appropriate   Balance   Static Sitting Fair -   Dynamic Sitting Poor +   Static Standing Zero   Activity Tolerance   Activity Tolerance Patient limited by fatigue   Medical Staff Made Aware NAVA Clarke  Nurse Made Aware Yes  Assessment   Prognosis Guarded   Problem List Decreased strength;Decreased range of motion;Decreased endurance; Impaired balance;Decreased mobility; Decreased cognition; Impaired judgement;Decreased safety awareness;Pain   Assessment The patient was found slid down in bed with his legs flexed and his shoulders where his hips should be  He was brought into sitting with significant effort  After a prolonged time sitting in order to recover standing trials were performed  Even with near dependent assistance he was only able to briefly stand  While he did attempt to assist, he was unable to motor plan appropriately which necessitated blocking his feet onto the floor, his knees, and lifting his hips  He also required dependent trunk support for each trial  Due to fatigue the return to supine was near dependent  The patient was positioned in the cardiac chair position with all needs within reach, the blinds and television on, and the alarm active  Barriers to Discharge Inaccessible home environment;Decreased caregiver support   Goals   Patient Goals PT  did not express  STG Expiration Date 01/26/23   PT Treatment Day 2   Plan   Treatment/Interventions Functional transfer training;LE strengthening/ROM; Therapeutic exercise; Endurance training;Cognitive reorientation;Patient/family training;Bed mobility;Elevations;Gait training   Progress Slow progress, cognitive deficits   PT Frequency 2-3x/wk   Recommendation   PT Discharge Recommendation Post acute rehabilitation services   AM-PAC Basic Mobility Inpatient   Turning in Flat Bed Without Bedrails 2   Lying on Back to Sitting on Edge of Flat Bed Without Bedrails 2   Moving Bed to Chair 1   Standing Up From Chair Using Arms 1   Walk in Room 1   Climb 3-5 Stairs With Railing 1   Basic Mobility Inpatient Raw Score 8   Turning Head Towards Sound 3   Follow Simple Instructions 3   Low Function Basic Mobility Raw Score 14   Low Function Basic Mobility Standardized Score 22 01   Highest Level Of Mobility   JH-HLM Goal 3: Sit at edge of bed   JH-HLM Achieved 3: Sit at edge of bed         An AM-PAC Basic Mobility standardized score less than 40 78 suggests the patient may benefit from discharge to post-acute rehab services      Daniella Hart, PTA

## 2023-01-18 NOTE — BRIEF OP NOTE (RAD/CATH)
IR Right THORACENTESIS Procedure Note    PATIENT NAME: Ernie Elizabeth  : 1940  MRN: [id number]    Pre-op Diagnosis:   1  Brain lesion    2  MSSA bacteremia    3  Stage 3a chronic kidney disease ([facility] [address] )    4  Wound of sacral region, initial encounter      Post-op Diagnosis:   1  Brain lesion    2  MSSA bacteremia    3  Stage 3a chronic kidney disease ([facility] [address] )    4   Wound of sacral region, initial encounter        Provider:   Sintia Madrigal PA-C  Assistants:     No qualified resident was available, Resident is only observing    Estimated Blood Loss: None    Findings: 850mL serosanguinous pleural fluid removed    Specimens: Labs collected and sent    Complications:  None immediately    Anesthesia: local    Sintia Madrigal PA-C     Date: 2023  Time: 3:38 PM

## 2023-01-18 NOTE — ASSESSMENT & PLAN NOTE
No exacerbation  • On trelegy, substituted with breo and incruse  • Continue scheduled nebulizer treatments

## 2023-01-18 NOTE — PROGRESS NOTES
[address] [facility]  Progress Note Nikky Calderon 1940, 80 y o  male MRN: [id number]  Unit/Bed#: [facility] 606-01 Encounter: [id number]  Primary Care Provider: Kerri Richardson,    Date and time admitted to hospital: 1/12/2023 11:49 PM    * Brain lesion  Assessment & Plan  Patient presented with progressive confusion, generalized weakness, and decreased oral intake to [address] [place] [address]  Transfer to [facility] for neurosurgical evaluation after CT of head showed enhancing lesion within the left parasagittal parietal lobe with surrounding vasogenic edema likely abscess  • Unable to obtain MRI due to pacemaker which is not compatible per prior documentation   • ESR 56, CRP 20  • Neurosurgery following,  • Continue IV nafcillin Q4 per infectious disease  • No surgical intervention at this time  Needs repeat CTH with contrast to evaluate for improvement on antibiotic regimen and re-eval potential need for surgery  Cannot use contrast 2/2 SHAWNEE, see below  Planning for [facility] WITHOUT tomorrow to evaluate for any bleeding/stability in preparation for possible PPM extraction  • Holding Xarelto in the interim until can exclude need for surgical intervention  · Patient with history of PPM, ID recommending removal  EP planning for this tentatively Friday  · Echo with Significant calcification or mitral and aortic valve  Difficult to exclude vegetation  If high clinical suspicion would need MARY  · Repeat [facility] SYSTEM 1/13 negative   • PT/OT recommending rehab  • Rest of plan as below     MSSA bacteremia  Assessment & Plan  On recent admission noted to have MSSA bacteremia 12/5  Likely originated from skin breakdown on arms and legs  Complicated by presence of pacemaker  TTE and MARY on prior admission negative  Had been receiving outpatient cefazolin through PICC line    Repeat blood cultures are negative however imaging concerning for ongoing pulmonary and out CNS septic emboli  • Continue IV Naficillin 2 g Q4H per infectious disease    Metabolic encephalopathy  Assessment & Plan  · Likely secondary to brain lesion as noted above  · Continue supportive care and IV antibiotic therapy   · Plan for repeat imaging as above    Acute respiratory failure ([address] )  Assessment & Plan  Now requiring 2L  · CXR with moderate right pleural effusion  · Consult IR for right thoracentesis  · Will send labs   · Wean oxygen as tolerated     Chronic diastolic heart failure (HCC)  Assessment & Plan  Wt Readings from Last 3 Encounters:   01/18/23 107 kg (236 lb 8 oz)   01/12/23 112 kg (246 lb)   01/03/23 113 kg (249 lb)     · Recent cardiac echo with EF 60% and severe AS  · Holding diuretic (torsemide 40 mg daily) in setting of SHAWNEE  · Low NA diet with FR  · Monitor volume status closely        SHAWNEE (acute kidney injury) [facility])  Assessment & Plan  Lab Results   Component Value Date    EGFR 19 01/18/2023    EGFR 26 01/17/2023    EGFR 30 01/16/2023    CREATININE 2 85 (H) 01/18/2023    CREATININE 2 20 (H) 01/17/2023    CREATININE 2 01 (H) 01/16/2023     · SHAWNEE on CKD3  Baseline creatinine 1 3-1 6, up to 2 85 today  · No hydronephrosis noted on CT A/P 1/12  · Check PVR  · Hold diuretics  · Nephrology following  · Holding off on contrast studies until renal function improved/stablizied and okay with nephro  · Urine studies pending  · BMP in AM    Chronic a-fib [facility])  Assessment & Plan  S/p pacemaker placement, not maintained on any rate controlling medications at this time  • Holding Xarelto for now per neurosurgery   • EP following, possible PPM extraction Friday  COPD (chronic obstructive pulmonary disease) ([facility])  Assessment & Plan  No exacerbation  • On trelegy, substituted with breo and incruse  • Continue scheduled nebulizer treatments      Essential hypertension  Assessment & Plan  · Acceptable BP on review   · Monitor    Hyperglycemia  Assessment & Plan  · Noted on AM BMP  · HgbA1c 5 6%  · D/c further finger sticks  · Trend on morning blood work    Hypokalemia  Assessment & Plan  · Improved to 3 5 today  · D/C telemetry  · Mag WNL  · BMP in AM    JOVI (obstructive sleep apnea)  Assessment & Plan  · Continue with CPAP nightly      VTE Pharmacologic Prophylaxis: VTE Score: 10 Moderate Risk (Score 3-4) - Pharmacological DVT Prophylaxis Contraindicated  Sequential Compression Devices Ordered  Patient Centered Rounds: I performed bedside rounds with nursing staff today  Discussions with Specialists or Other Care Team Provider: nursing, nephro, neurosurgery, case management     Education and Discussions with Family / Patient: Updated  (daughter) via phone  Susie     Time Spent for Care: 45 minutes  More than 50% of total time spent on counseling and coordination of care as described above  Current Length of Stay: 6 day(s)  Current Patient Status: Inpatient   Certification Statement: The patient will continue to require additional inpatient hospital stay due to thoracentesis, close monitoring in respiratory status, monitoring of renal function, repeat imaging, iv abx  Discharge Plan: Anticipate discharge in >72 hrs to rehab facility  Code Status: Level 1 - Full Code    Subjective:   Patient is confused  Significant slurred speech  Follows some commands  Appears mildly SOB  ROS is limited  Objective:     Vitals:   Temp (24hrs), Av 6 °F (37 6 °C), Min:99 2 °F (37 3 °C), Max:100 °F (37 8 °C)    Temp:  [99 2 °F (37 3 °C)-100 °F (37 8 °C)] 99 2 °F (37 3 °C)  HR:  [93-97] 93  Resp:  [18-20] 18  BP: (114-127)/(72-77) 127/74  SpO2:  [95 %-98 %] 97 %  Body mass index is 30 36 kg/m²  Input and Output Summary (last 24 hours): Intake/Output Summary (Last 24 hours) at 2023 1108  Last data filed at 2023 0900  Gross per 24 hour   Intake 480 ml   Output --   Net 480 ml       Physical Exam:   Physical Exam  Vitals and nursing note reviewed     Constitutional: Interventions: Nasal cannula in place  Cardiovascular:      Rate and Rhythm: Normal rate  Heart sounds: Murmur heard  Pulmonary:      Breath sounds: Rhonchi and rales present  Comments: Mildly SOB on exam  Loose cough noted   Abdominal:      Palpations: Abdomen is soft  Musculoskeletal:         General: No swelling  Skin:     General: Skin is warm  Findings: Bruising present  Neurological:      Mental Status: He is alert  He is disoriented  Additional Data:     Labs:  Results from last 7 days   Lab Units 01/18/23  0442   WBC Thousand/uL 12 00*   HEMOGLOBIN g/dL 8 8*   HEMATOCRIT % 29 8*   PLATELETS Thousands/uL 221   NEUTROS PCT % 79*   LYMPHS PCT % 12*   MONOS PCT % 7   EOS PCT % 0     Results from last 7 days   Lab Units 01/18/23  0442   SODIUM mmol/L 138   POTASSIUM mmol/L 3 5   CHLORIDE mmol/L 97   CO2 mmol/L 33*   BUN mg/dL 30*   CREATININE mg/dL 2 85*   ANION GAP mmol/L 8   CALCIUM mg/dL 8 8   ALBUMIN g/dL 2 0*   TOTAL BILIRUBIN mg/dL 1 34*   ALK PHOS U/L 62   ALT U/L <6*   AST U/L 16   GLUCOSE RANDOM mg/dL 159*         Results from last 7 days   Lab Units 01/14/23  1135 01/14/23  0813   POC GLUCOSE mg/dl 158* 163*     Results from last 7 days   Lab Units 01/14/23  0552   HEMOGLOBIN A1C % 5 6           Lines/Drains:  Invasive Devices     Central Venous Catheter Line  Duration           CVC Central Lines 12/12/22 Single Right Subclavian 36 days          Drain  Duration           External Urinary Catheter Small 7 days                Central Line:  Goal for removal: N/A - Discharging with PICC for IV ABX/medications             Imaging: Reviewed radiology reports from this admission including: chest xray    Recent Cultures (last 7 days):   Results from last 7 days   Lab Units 01/13/23  0105   BLOOD CULTURE  No Growth After 5 Days  No Growth After 5 Days         Last 24 Hours Medication List:   Current Facility-Administered Medications   Medication Dose Route Frequency Provider Last Rate   • acetaminophen  650 mg Oral Q6H PRN Lauree Gowers, DO     • acetylcysteine  3 mL Nebulization TID [facility]     • allopurinol  200 mg Oral Daily Lauree Gowers, DO     • docusate sodium  100 mg Oral BID Lauree Gowers, DO     • DULoxetine  60 mg Oral BID Lauree Gowers, DO     • finasteride  5 mg Oral Daily Lauree Gowers, DO     • fluticasone-vilanterol  1 puff Inhalation Daily [facility], DO     • levalbuterol  1 25 mg Nebulization TID [facility], DO     • nafcillin  2,000 mg Intravenous Q4H [facility], DO 2,000 mg (01/18/23 9769)   • pantoprazole  40 mg Oral Early Morning [facility], DO     • polyethylene glycol  17 g Oral Daily [facility], DO     • senna  8 6 mg Oral HS [facility], DO     • sodium chloride (PF)  10 mL Intravenous [facility] Gow[facility], DO     • umeclidinium  1 puff Inhalation Daily [facility], DO          Today, Patient Was Seen By: NAVA Smith    **Please Note: This note may have been constructed using a voice recognition system  **

## 2023-01-18 NOTE — ASSESSMENT & PLAN NOTE
Patient presented with progressive confusion, generalized weakness, and decreased oral intake to [facility]  Transfer to [place] for neurosurgical evaluation after CT of head showed enhancing lesion within the left parasagittal parietal lobe with surrounding vasogenic edema likely abscess  • Unable to obtain MRI due to pacemaker which is not compatible per prior documentation   • ESR 56, CRP 20  • Neurosurgery following,  • Continue IV nafcillin Q4 per infectious disease  • No surgical intervention at this time  Needs repeat CTH with contrast to evaluate for improvement on antibiotic regimen and re-eval potential need for surgery  Cannot use contrast 2/2 SHAWNEE, see below  Planning for [facility] WITHOUT tomorrow to evaluate for any bleeding/stability in preparation for possible PPM extraction  • Holding Xarelto in the interim until can exclude need for surgical intervention  · Patient with history of PPM, ID recommending removal  EP planning for this tentatively Friday  · Echo with Significant calcification or mitral and aortic valve  Difficult to exclude vegetation  If high clinical suspicion would need MARY  · Repeat [facility] 1/13 negative   • PT/OT recommending rehab     • Rest of plan as below

## 2023-01-18 NOTE — PROGRESS NOTES
Progress note - Palliative and Supportive Care   Cody Grijalva 80 y o  male [id number]    Patient Active Problem List   Diagnosis   • Chronic pain syndrome   • Bilateral lumbar radiculopathy   • Lumbar canal stenosis   • Pain in both lower extremities   • JOO (generalized anxiety disorder)   • Benign hypertension with chronic kidney disease, stage III (formerly [facility])   • Persistent proteinuria   • Benign prostatic hyperplasia   • Allergic rhinitis   • Aneurysm of abdominal aorta   • Chronic a-fib (formerly [facility])   • Carpal tunnel syndrome   • Chronic gout without tophus   • Centrilobular emphysema (formerly [facility])   • Deep venous insufficiency   • Difficulty in walking   • Fecal soiling   • Fibromyalgia   • Hyperlipidemia   • Moderate or severe vision impairment, one eye   • Class 2 obesity due to excess calories without serious comorbidity with body mass index (BMI) of 36 0 to 36 9 in adult   • Smoker   • Pacemaker   • JOVI (obstructive sleep apnea)   • Urinary incontinence   • Venous insufficiency (chronic) (peripheral)   • Peripheral arteriosclerosis (formerly [facility])   • Serum total bilirubin elevated   • Acute respiratory failure (formerly [facility])   • SHAWNEE (acute kidney injury) ([facility] [address] )   • CAD (coronary artery disease)   • Status post primary angioplasty with coronary stent   • Hypokalemia   • Mixed simple and mucopurulent chronic bronchitis (formerly [facility])   • Hyperuricemia   • Bilateral leg edema   • Nephrolithiasis   • Former smoker   • Symptomatic anemia   • Vitamin D insufficiency   • Chronic constipation   • Idiopathic hematuria   • Kidney stone on left side   • Flank pain   • Obesity (BMI 30-39  9)   • Dyspnea on exertion   • Neuropathy   • Coronary artery arteriosclerosis   • Essential hypertension   • Memory difficulty   • COPD (chronic obstructive pulmonary disease) (formerly [facility])   • Chronic diastolic heart failure (formerly [facility])   • Falls   • Closed fracture of right femur (formerly [facility])   • Valvular heart disease   • Pressure injury of right buttock, unstageable (formerly [facility])   • Ambulatory dysfunction   • Generalized weakness   • Depression   • Dysphagia, pharyngoesophageal phase   • Gastro-esophageal reflux disease without esophagitis   • Generalized muscle weakness   • History of falling   • Iron deficiency anemia   • Major depressive disorder, recurrent, unspecified (HCC)   • Moderate to severe aortic stenosis   • Other abnormalities of gait and mobility   • Other specified polyneuropathies   • Pulmonary hypertension due to left heart disease (HCC)   • Presence of coronary angioplasty implant and graft   • Unspecified hearing loss, unspecified ear   • Primary osteoarthritis of both knees   • Left hip pain   • Cervical strain   • Lump of skin of left lower extremity   • Elevated troponin   • Anemia   • Depression with anxiety   • MSSA bacteremia   • Abnormal CAT scan   • Influenza   • Brain lesion   • Metabolic encephalopathy   • Hyperglycemia     Steven Stephens is an 79 yo male with MSSA bacteremia who was admitted for encephalopathy and found to have multiple brain lesions thought to be infectious in origin  He is receiving a course of Nafcillin and the plan is to repeat imaging of his head tomorrow to gauge progress or lack thereof  Active issues specifically addressed today include:   MSSA bacteremia, brain lesions, encephalopathy    Plan:  1  Symptom management -    - defer to primary team    2  Goals -   -likely transition to comfort     Code Status: full - Level 1   Decisional apparatus:  Patient is not competent on my exam today  If competence is lost, patient's substitute decision maker would default to adult children by [place]  Advance Directive / Living Will / POLST:  As documented    Interval history:            Pt seen and examined bedside  Unable to provide history, acute delirium  Daughter Joselin Steve at bedside engages in [address] discussion  She feels strongly that her father is suffering and that he I not himself  Wonders whether there is any possibility of him improving   Values quality over quantity of life, hopes everyone will agree when it is time to make a decision  Encouraged to reach out with any questions they may have  Their plan would be to take him home and care for him there if transition to comfort measures ends up being the best option  Awaiting tomorrow's scan  MEDICATIONS / ALLERGIES:     all current active meds have been reviewed    No Known Allergies    OBJECTIVE:    Physical Exam  Physical Exam  Constitutional:       General: He is in acute distress  Appearance: He is obese  He is ill-appearing and toxic-appearing  HENT:      Head: Normocephalic  Nose: Nose normal       Comments: NC in place     Mouth/Throat:      Mouth: Mucous membranes are moist    Eyes:      Extraocular Movements: Extraocular movements intact  Cardiovascular:      Rate and Rhythm: Normal rate  Pulmonary:      Effort: Respiratory distress present  Breath sounds: Rhonchi present  Abdominal:      General: There is no distension  Palpations: Abdomen is soft  Musculoskeletal:         General: Normal range of motion  Cervical back: Normal range of motion  Right lower leg: No edema  Left lower leg: No edema  Skin:     General: Skin is warm and dry  Coloration: Skin is pale  Findings: Bruising present  Neurological:      Mental Status: He is alert  He is disoriented  Psychiatric:      Comments: Florid delirium         Lab Results: I have personally reviewed pertinent labs  Imaging Studies: pertinent studies reviewed  EKG, Pathology, and Other Studies: pertinent studies reviewed    Counseling / Coordination of Care    Total floor / unit time spent today 40 minutes  Greater than 50% of total time was spent with the patient and / or family counseling and / or coordination of care   A description of the counseling / coordination of care: assessment, emotional support for family, ongoing [address] discussion

## 2023-01-18 NOTE — ASSESSMENT & PLAN NOTE
· Likely secondary to brain lesion as noted above  · Continue supportive care and IV antibiotic therapy   · Plan for repeat imaging as above

## 2023-01-18 NOTE — ASSESSMENT & PLAN NOTE
Lab Results   Component Value Date    EGFR 19 01/18/2023    EGFR 26 01/17/2023    EGFR 30 01/16/2023    CREATININE 2 85 (H) 01/18/2023    CREATININE 2 20 (H) 01/17/2023    CREATININE 2 01 (H) 01/16/2023     · SHAWNEE on CKD3  Baseline creatinine 1 3-1 6, up to 2 85 today  · No hydronephrosis noted on CT A/P 1/12  · Check PVR  · Hold diuretics  · Nephrology following  · Holding off on contrast studies until renal function improved/stablizied and okay with nephro    · Urine studies pending  · BMP in AM

## 2023-01-19 NOTE — PROGRESS NOTES
Attempted to place Indwelling catheter, with family consent, as per comfort care orders  However, due to patient's anatomy, placement by Nursing was unsuccessful  Bladder Scan at the time shows 215 mL, and SLIM provider Taylor Rockwell PA-C was updated of situation  Instructed to not insert catheter at this time, and that Urology would not be consulted at this time  Will keep SLIM informed of patient condition  Family is in agreement with this plan

## 2023-01-19 NOTE — PLAN OF CARE
Problem: OCCUPATIONAL THERAPY ADULT  Goal: Performs self-care activities at highest level of function for planned discharge setting  See evaluation for individualized goals  Description: Treatment Interventions: ADL retraining, Functional transfer training, UE strengthening/ROM, Endurance training, Cognitive reorientation, Patient/family training, Compensatory technique education, Continued evaluation, Energy conservation, Activityengagement          See flowsheet documentation for full assessment, interventions and recommendations     Outcome: Adequate for Discharge   April A Storm

## 2023-01-19 NOTE — PROGRESS NOTES
This  introduced herself to family gathered bedside   offered emotional and/or spiritual support, family declined   Spiritual Care remains available to family

## 2023-01-19 NOTE — PROGRESS NOTES
Notified that patient has transitioned to comfort care  Imaging cancelled  Please notify neurosurgery with any questions or concerns

## 2023-01-19 NOTE — QUICK NOTE
TT by [address] DI alert  Pt admitted 1/12 dt worsening confusion, multiple falls and decreased oral intake  Pt transferred to [address] [place] [address] new findings of left occipital brain step abscess in the setting of refractory MSSA bacteremia   Currently being seen by nephrology dt SHAWNEE likely dt inflammatory changes and probable prerenal azotemia dt poor oral intake   Hx of afib on xarelto on hold now , copd and CHF ef of 60% but severe AS - diuretic on hold dt shawnee   neurosx have opted for no sx intervention at this time will need repeat cth with contrast to evaluate for improvement however pt cannot get contrast in setting of SHAWNEE   Plan for possible PPM extraction   [facility] to get [facility] wo contrast tomorrow to evaluate for bleeding in order to undergo PPM extraction  Repeat blood cultures are negative  But imaging is concerning for ongoing pulmonary and CNS septic emboli  On Naficillin per ID management  Palliative care did see pt today with plan for likely transition to comfort care currently still a level 1 full code, and waiting to see tomorrows scan to make further decision   (Daughter Nitesh Alan)     CC alerted me to DI alert  - increased work of breathing sp right thoracentesis today   Chest xray 1/18 - xray does appear improved from xray yesterday on review   Increased to Level 2 step down   Continue abx and tylenol

## 2023-01-19 NOTE — PLAN OF CARE
Problem: Prexisting or High Potential for Compromised Skin Integrity  Goal: Skin integrity is maintained or improved  Description: INTERVENTIONS:  - Identify patients at risk for skin breakdown  - Assess and monitor skin integrity  - Assess and monitor nutrition and hydration status  - Monitor labs   - Assess for incontinence   - Turn and reposition patient  - Assist with mobility/ambulation  - Relieve pressure over bony prominences  - Avoid friction and shearing  - Provide appropriate hygiene as needed including keeping skin clean and dry  - Evaluate need for skin moisturizer/barrier cream  - Collaborate with interdisciplinary team   - Patient/family teaching  - Consider wound care consult   Outcome: Progressing     Problem: MOBILITY - ADULT  Goal: Maintain or return to baseline ADL function  Description: INTERVENTIONS:  -  Assess patient's ability to carry out ADLs; assess patient's baseline for ADL function and identify physical deficits which impact ability to perform ADLs (bathing, care of mouth/teeth, toileting, grooming, dressing, etc )  - Assess/evaluate cause of self-care deficits   - Assess range of motion  - Assess patient's mobility; develop plan if impaired  - Assess patient's need for assistive devices and provide as appropriate  - Encourage maximum independence but intervene and supervise when necessary  - Involve family in performance of ADLs  - Assess for home care needs following discharge   - Consider OT consult to assist with ADL evaluation and planning for discharge  - Provide patient education as appropriate  Outcome: Progressing  Goal: Maintains/Returns to pre admission functional level  Description: INTERVENTIONS:  - Perform BMAT or MOVE assessment daily    - Set and communicate daily mobility goal to care team and patient/family/caregiver  - Collaborate with rehabilitation services on mobility goals if consulted  - Perform Range of Motion 3 times a day    - Reposition patient every 2 hours   - Dangle patient 3 times a day  - Stand patient 3 times a day  - Ambulate patient 3 times a day  - Out of bed to chair 3 times a day   - Out of bed for meals 3 times a day  - Out of bed for toileting  - Record patient progress and toleration of activity level   Outcome: Progressing     Problem: Potential for Falls  Goal: Patient will remain free of falls  Description: INTERVENTIONS:  - Educate patient/family on patient safety including physical limitations  - Instruct patient to call for assistance with activity   - Consult OT/PT to assist with strengthening/mobility   - Keep Call bell within reach  - Keep bed low and locked with side rails adjusted as appropriate  - Keep care items and personal belongings within reach  - Initiate and maintain comfort rounds  - Make Fall Risk Sign visible to staff  - Offer Toileting every 2 Hours, in advance of need  - Initiate/Maintain on alarm  - Obtain necessary fall risk management equipment:  - Apply yellow socks and bracelet for high fall risk patients  - Consider moving patient to room near nurses station  Outcome: Progressing     Problem: Nutrition/Hydration-ADULT  Goal: Nutrient/Hydration intake appropriate for improving, restoring or maintaining nutritional needs  Description: Monitor and assess patient's nutrition/hydration status for malnutrition  Collaborate with interdisciplinary team and initiate plan and interventions as ordered  Monitor patient's weight and dietary intake as ordered or per policy  Utilize nutrition screening tool and intervene as necessary  Determine patient's food preferences and provide high-protein, high-caloric foods as appropriate       INTERVENTIONS:  - Monitor oral intake, urinary output, labs, and treatment plans  - Assess nutrition and hydration status and recommend course of action  - Evaluate amount of meals eaten  - Assist patient with eating if necessary   - Allow adequate time for meals  - Recommend/ encourage appropriate diets, oral nutritional supplements, and vitamin/mineral supplements  - Order, calculate, and assess calorie counts as needed  - Recommend, monitor, and adjust tube feedings and TPN/PPN based on assessed needs  - Assess need for intravenous fluids  - Provide specific nutrition/hydration education as appropriate  - Include patient/family/caregiver in decisions related to nutrition  Outcome: Progressing

## 2023-01-19 NOTE — QUICK NOTE
QUICK NOTE - Deterioration Index  Ford Wynn 80 y o  male MRN: [id number]  Unit/Bed#: [facility] 606-01 Encounter: [id number]    Date Paged: 23  Time Paged:   Room #: 228  Arrival Time:   Deterioration index score at time of page: 59 81  %  Spoke with RN and Kaylene Pham, NP from primary team  Need to escalate level of care: yes     PROBLEMS resulting in high DI score:   26% Jorge coma scale 12   24 Age 80years old   18% Supplemental oxygen Nasal cannula   7% Pulse oximetry 89 %   6% Cardiac rhythm Atrial fibrillation     •     PLAN:    • Increased work of breathing, status post right thoracentesis today  Follow-up chest x-ray  • GCS unchanged from baseline, if mental status worsens consider ABG  • Escalate to stepdown 2, patient requires frequent suctioning  • Continue airway clearance protocol  • Continue antibiotics, ID following  • As needed Tylenol for fever   • Plan reviewed with SLIM NP     Please contact critical care via [address] [place] [address] with any questions or concerns       Vitals:   Vitals:    23 1524 23 1601 23 1902 23 2136   BP: 134/62 129/83 142/69 138/68   BP Location:  Right arm     Pulse: 100 91 99 99   Resp: 20 20 18    Temp:  99 5 °F (37 5 °C) 98 4 °F (36 9 °C) (!) 101 3 °F (38 5 °C)   TempSrc:  Oral     SpO2: 95% 97% 94% 92%   Weight:       Height:           Respiratory:  SpO2: SpO2: 92 %, SpO2 Activity: SpO2 Activity: At Rest, SpO2 Device: O2 Device: Nasal cannula  Nasal Cannula O2 Flow Rate (L/min): 2 L/min    Temperature: Temp (24hrs), Av 6 °F (37 6 °C), Min:98 4 °F (36 9 °C), Max:101 3 °F (38 5 °C)  Current: Temperature: (!) 101 3 °F (38 5 °C)    Labs:   Results from last 7 days   Lab Units 23  0442 23  0512 23  0534 23  1339 23  0605   WBC Thousand/uL 12 00* 8 14 7 69   < > 6 87   HEMOGLOBIN g/dL 8 8* 9 2* 8 7*   < > 8 3*   HEMATOCRIT % 29 8* 30 4* 30 4*   < > 27 3*   PLATELETS Thousands/uL 221 229 207   < > 202 NEUTROS PCT % 79*  --   --   --  79*   MONOS PCT % 7  --   --   --  7    < > = values in this interval not displayed       Results from last 7 days   Lab Units 01/18/23  0442 01/17/23  0512 01/16/23  0534 01/15/23  0557 01/14/23  0549   SODIUM mmol/L 138 137 137 135 138   POTASSIUM mmol/L 3 5 2 9* 3 1* 3 6 3 3*   CHLORIDE mmol/L 97 97 97 97 100   CO2 mmol/L 33* 33* 34* 31 31   BUN mg/dL 30* 25 25 26* 20   CREATININE mg/dL 2 85* 2 20* 2 01* 2 07* 1 69*   CALCIUM mg/dL 8 8 8 8 8 5 8 4 8 5   ALK PHOS U/L 62  --   --  91 70   ALT U/L <6*  --   --  <6* <6*   AST U/L 16  --   --  17 15     Results from last 7 days   Lab Units 01/18/23  0442 01/15/23  0557 01/13/23  0605   MAGNESIUM mg/dL 1 8 1 9 2 1                   Code Status: Level 1 - Full Code

## 2023-01-19 NOTE — ASSESSMENT & PLAN NOTE
Patient presented with progressive confusion, generalized weakness, and decreased oral intake to [address] [place] [address] Avenue  Transfer to [place] for neurosurgical evaluation after CT of head showed enhancing lesion within the left parasagittal parietal lobe with surrounding vasogenic edema likely abscess    • Unfortunately patient deteriorated since transfer and was transitioned to comfort measures only overnight   • Family are upset as moose is struggling   • Prn medications increased, and dilaudid added via pump for constant dosing due to dyspnea and air hunger

## 2023-01-19 NOTE — PLAN OF CARE
Problem: Prexisting or High Potential for Compromised Skin Integrity  Goal: Skin integrity is maintained or improved  Description: INTERVENTIONS:  - Identify patients at risk for skin breakdown  - Assess and monitor skin integrity  - Assess and monitor nutrition and hydration status  - Monitor labs   - Assess for incontinence   - Turn and reposition patient  - Assist with mobility/ambulation  - Relieve pressure over bony prominences  - Avoid friction and shearing  - Provide appropriate hygiene as needed including keeping skin clean and dry  - Evaluate need for skin moisturizer/barrier cream  - Collaborate with interdisciplinary team   - Patient/family teaching  - Consider wound care consult   Outcome: Progressing     Problem: Potential for Falls  Goal: Patient will remain free of falls  Description: INTERVENTIONS:  - Educate patient/family on patient safety including physical limitations  - Instruct patient to call for assistance with activity   - Consult OT/PT to assist with strengthening/mobility   - Keep Call bell within reach  - Keep bed low and locked with side rails adjusted as appropriate  - Keep care items and personal belongings within reach  - Initiate and maintain comfort rounds  - Make Fall Risk Sign visible to staff  - Offer Toileting every 1 Hours, in advance of need  - Initiate/Maintain alarm  - Obtain necessary fall risk management equipment  - Apply yellow socks and bracelet for high fall risk patients  - Consider moving patient to room near nurses station  Outcome: Progressing     Problem: COPING  Goal: Pt/Family able to verbalize concerns and demonstrate effective coping strategies  Description: INTERVENTIONS:  - Assist patient/family to identify coping skills, available support systems and cultural and spiritual values  - Provide emotional support, including active listening and acknowledgement of concerns of patient and caregivers  - Reduce environmental stimuli, as able  - Provide patient education  - Assess for spiritual pain/suffering and initiate spiritual care, including notification of Pastoral Care or komal based community as needed  - Assess effectiveness of coping strategies  Outcome: Progressing  Goal: Will report anxiety at manageable levels  Description: INTERVENTIONS:  - Administer medication as ordered  - Teach and encourage coping skills  - Provide emotional support  - Assess patient/family for anxiety and ability to cope  Outcome: Progressing     Problem: DEATH & DYING  Goal: Pt/Family communicate acceptance of impending death and expresses psychological comfort and peace  Description: INTERVENTIONS:  - Assess patient/family anxiety and grief process related to end of life issues  - Provide emotional, spiritual and psychosocial support  - Provide information about the patient's health status with consideration of family and cultural values  - Communicate willingness to discuss death and facilitate grief process  with patient/family as appropriate  - Emphasize sustaining relationships within family system and community, or komal/spiritual traditions  - Initiate Spiritual Care, Pastoral care or other ancillary consults as needed  - Refer to community support groups as appropriate  Outcome: Progressing     Problem: DECISION MAKING  Goal: Pt/Family able to effectively weigh alternatives and participate in decision making related to treatment and care  Description: INTERVENTIONS:  - Identify decision maker  - Determine when there are differences among patient's view, family's view, and healthcare provider's view of patient condition and care goals  - Facilitate patient/family articulation of goals for care  - Help patient/family identify pros/cons of alternative solutions  - Provide information as requested by patient/family  - Respect patient/family rights related to privacy and sharing information   - Serve as a liaison between patient, family and health care team  - Initiate consults as appropriate (Ethics Team, Palliative Care, Family Care Conference, etc )  Outcome: Progressing     Problem: SPIRITUAL CARE  Goal: Pt/Family able to move forward in process of forgiving self, others and/or higher power  Description: INTERVENTIONS:  - Assist patient with any spiritual needs/requests such as communion, confession, anointing, etc  - Explore guilt and help patient/family identify possible spiritual/cultural beliefs and values  - Explore possibilities of making amends & reconciliation with self, others, and/or a greater power  - Guide patient/family in identifying painful feelings  - Help patient explore and identify spiritual beliefs, cultural understandings or values that may help or hinder letting go of issue  - Help patient explore feelings of anger, bitterness, resentment, anxiety   Help patient/family identify and examine the situation in which these feelings are experienced  - Help patient/family identify destructive displacement of feelings onto other individuals  - Refer patient to formal counseling and/or to komal community for further support as needed or per request  Outcome: Progressing  Goal: Patient feels balance and connection with others and/or higher power that empowers the self during times of loss, guilt and fear  Description: INTERVENTIONS:  - Create safety for patient through empathic presence and non-judgmental listening  - Encourage patient to explore his/her values, beliefs and/or spiritual images and practices  - Encourage use of breath work, imagery, meditation, relaxation, reiki to ease distress and provide healing  - Encourage use of cultural and spiritual celebrations and rituals  - Facilitate discussion that helps patient sort out spiritual concerns  - Help patient identify where meaning/hope/comfort & strength are in his/her life  - Refer patient to komal community for assistance, as appropriate  - Respond to patient/family need for prayer/ritual/sacrament/ceremony  Outcome: Progressing

## 2023-01-19 NOTE — QUICK NOTE
Discussion with both daughters dia and lori both at bedside  Request dad be made level 4 comfort care   They request that no heroic measures occur and that they stay at bedside at this time   Will transition pt from full code to level 4 comfort care at this time  Discontinued all oral meds, pt unable to take po  Will add low dose ativan 0 5mg q 4 hrs as they want only to give if his breathing appears as if he is struggling     Will add scopolamine patch for comfort and secretions   Discussed with daughters they have preference for  home: [address] [place] [address]  home [phone]  Only the  home in [facility]   They do not wish for autopsy at time of death

## 2023-01-19 NOTE — OCCUPATIONAL THERAPY NOTE
Occupational Therapy Treatment Note:      01/19/23 1342   Note Type   Note Type Cancelled Session   Cancel Reasons   (plan to d/c acute ot services as pt is now on comfort care )

## 2023-01-19 NOTE — SPEECH THERAPY NOTE
Patient transitioned to comfort care/hospice  Diet liberalized to a regular, pleasure feed  Will discontinue ST order at this time

## 2023-01-19 NOTE — PROGRESS NOTES
Progress note - Palliative and Supportive Care   Laureen Fernandez 80 y o  male [id number]    Patient Active Problem List   Diagnosis   • Chronic pain syndrome   • Bilateral lumbar radiculopathy   • Lumbar canal stenosis   • Pain in both lower extremities   • JOO (generalized anxiety disorder)   • Benign hypertension with chronic kidney disease, stage III ([facility])   • Persistent proteinuria   • Benign prostatic hyperplasia   • Allergic rhinitis   • Aneurysm of abdominal aorta   • Chronic a-fib ([facility])   • Carpal tunnel syndrome   • Chronic gout without tophus   • Centrilobular emphysema ([facility])   • Deep venous insufficiency   • Difficulty in walking   • Fecal soiling   • Fibromyalgia   • Hyperlipidemia   • Moderate or severe vision impairment, one eye   • Class 2 obesity due to excess calories without serious comorbidity with body mass index (BMI) of 36 0 to 36 9 in adult   • Smoker   • Pacemaker   • JOVI (obstructive sleep apnea)   • Urinary incontinence   • Venous insufficiency (chronic) (peripheral)   • Peripheral arteriosclerosis ([facility])   • Serum total bilirubin elevated   • Acute respiratory failure ([facility])   • SHAWNEE (acute kidney injury) ([facility] [address] )   • CAD (coronary artery disease)   • Status post primary angioplasty with coronary stent   • Hypokalemia   • Mixed simple and mucopurulent chronic bronchitis ([facility])   • Hyperuricemia   • Bilateral leg edema   • Nephrolithiasis   • Former smoker   • Symptomatic anemia   • Vitamin D insufficiency   • Chronic constipation   • Idiopathic hematuria   • Kidney stone on left side   • Flank pain   • Obesity (BMI 30-39  9)   • Dyspnea on exertion   • Neuropathy   • Coronary artery arteriosclerosis   • Essential hypertension   • Memory difficulty   • COPD (chronic obstructive pulmonary disease) ([facility])   • Chronic diastolic heart failure ([facility])   • Falls   • Closed fracture of right femur ([facility])   • Valvular heart disease   • Pressure injury of right buttock, unstageable ([facility])   • Ambulatory dysfunction   • Generalized weakness   • Depression   • Dysphagia, pharyngoesophageal phase   • Gastro-esophageal reflux disease without esophagitis   • Generalized muscle weakness   • History of falling   • Iron deficiency anemia   • Major depressive disorder, recurrent, unspecified (HCC)   • Moderate to severe aortic stenosis   • Other abnormalities of gait and mobility   • Other specified polyneuropathies   • Pulmonary hypertension due to left heart disease (HCC)   • Presence of coronary angioplasty implant and graft   • Unspecified hearing loss, unspecified ear   • Primary osteoarthritis of both knees   • Left hip pain   • Cervical strain   • Lump of skin of left lower extremity   • Elevated troponin   • Anemia   • Depression with anxiety   • MSSA bacteremia   • Abnormal CAT scan   • Influenza   • Brain lesion   • Metabolic encephalopathy   • Hyperglycemia     Active issues specifically addressed today include: MSSA bacteremia, AHRF, edmund lesions, GOC/comfort care    Plan:  1  Symptom management -     #Comfort Meds       -Robinul prn and repositioning for secretions (DC scopolamine patch as can cause agitation at EOL)       -Dilaudid 0 5 mg q2 prn for pain or dyspnea       -Ativan 1 mg q1 prn for agitation or anxiety    2  Goals - comfort     Code Status: DNR/DNI - Level 4   Decisional apparatus:  Patient is not competent on my exam today  If competence is lost, patient's substitute decision maker would default to Praveen Bautista (daughter) as named in living will  Advance Directive / Living Will / POLST:  As documented    Interval history:       Pt seen and examined bedside  He is lying in bed with his eyes closed  His breathing is audible but face does not show distress  His family is gathered around him making jokes and engaging in life review  They are content with the situation, do not wish to move him to [facility]  All questions are answered and family is encouraged to reach out if support is needed   They express that they are hoping he will go quickly but he is stubborn  MEDICATIONS / ALLERGIES:     all current active meds have been reviewed    No Known Allergies    OBJECTIVE:    Physical Exam  Physical Exam  Constitutional:       General: He is not in acute distress  Appearance: He is obese  He is ill-appearing and toxic-appearing  HENT:      Head: Normocephalic  Nose: Nose normal       Mouth/Throat:      Mouth: Mucous membranes are dry  Eyes:      Extraocular Movements: Extraocular movements intact  Cardiovascular:      Rate and Rhythm: Tachycardia present  Pulmonary:      Breath sounds: Rhonchi present  Comments: Tachypnea and coarse rhonchi  Musculoskeletal:         General: Normal range of motion  Cervical back: Normal range of motion  Right lower leg: No edema  Left lower leg: No edema  Skin:     General: Skin is warm and dry  Neurological:      Mental Status: He is disoriented  Psychiatric:      Comments: Obtunded but raises eyebrows when spoken to and appears to understand         Lab Results: I have personally reviewed pertinent labs  Imaging Studies: pertinent imaging reviewed  EKG, Pathology, and Other Studies: pertinent studies reviewed    Counseling / Coordination of Care    Total floor / unit time spent today 20 minutes  Greater than 50% of total time was spent with the patient and / or family counseling and / or coordination of care   A description of the counseling / coordination of care: assessment, emotional support, communication with team

## 2023-01-19 NOTE — PROGRESS NOTES
[facility]  Progress Note Oliver Franklin 1940, 80 y o  male MRN: [id number]  Unit/Bed#: [facility] 606-01 Encounter: [id number]  Primary Care Provider: Dino Trevino DO   Date and time admitted to hospital: 1/12/2023 11:49 PM    * Brain lesion  Assessment & Plan  Patient presented with progressive confusion, generalized weakness, and decreased oral intake to [address] [place] [address]  Transfer to [facility] for neurosurgical evaluation after CT of head showed enhancing lesion within the left parasagittal parietal lobe with surrounding vasogenic edema likely abscess  • Unfortunately patient deteriorated since transfer and was transitioned to comfort measures only overnight   • Family are upset as moose is struggling   • Prn medications increased, and dilaudid added via pump for constant dosing due to dyspnea and air hunger     Acute respiratory failure (HCC)  Assessment & Plan  · Comfort care     MSSA bacteremia  Assessment & Plan  • Was on nafcillin   • No longer needed on comfort     Metabolic encephalopathy  Assessment & Plan  · Likely secondary to brain lesion as noted above  · Comfort measures     SHAWNEE (acute kidney injury) ([facility] [address] )  Assessment & Plan  · Comfort measures only   · No further blood draws     Hypokalemia  Assessment & Plan  · No need to follow   · Comfort measures only         VTE Pharmacologic Prophylaxis: VTE Score: 10 High Risk (Score >/= 5) - Pharmacological DVT Prophylaxis Contraindicated  Sequential Compression Devices Ordered  Patient Centered Rounds: I performed bedside rounds with nursing staff today  Discussions with Specialists or Other Care Team Provider: primary RN, CHANA     Education and Discussions with Family / Patient: Updated  (multiple family members including daughters, and wife ) at bedside  Time Spent for Care: 30 minutes   More than 50% of total time spent on counseling and coordination of care as described above     Current Length of Stay: 7 day(s)  Current Patient Status: Inpatient   Certification Statement: The patient will continue to require additional inpatient hospital stay due to pending hospice   Discharge Plan: Anticipate discharge tomorrow to likely will  in 24-48 hours     Code Status: Level 4 - Comfort Care    Subjective:   Discussed with family at bedside  Patient was transitioned to hospice overnight  Family is requesting increase in pain medications as their family member is obviously struggling  Objective:     Vitals:   Temp (24hrs), Av 5 °F (38 1 °C), Min:98 4 °F (36 9 °C), Max:101 8 °F (38 8 °C)    Temp:  [98 4 °F (36 9 °C)-101 8 °F (38 8 °C)] 100 8 °F (38 2 °C)  HR:  [86-99] 86  Resp:  [18-54] 40  BP: (129-151)/(68-83) 140/72  SpO2:  [92 %-97 %] 97 %  Body mass index is 30 36 kg/m²  Input and Output Summary (last 24 hours): Intake/Output Summary (Last 24 hours) at 2023 1545  Last data filed at 2023 1815  Gross per 24 hour   Intake 240 ml   Output --   Net 240 ml       Physical Exam:   Physical Exam  Vitals and nursing note reviewed  Constitutional:       Appearance: He is toxic-appearing  Ill appearance: toxc appearing    Pulmonary:      Comments: On 2 L  audible rattle and gargling with respirations   Musculoskeletal:      Right lower leg: Edema present  Left lower leg: Edema present  Skin:     Coloration: Skin is pale     Neurological:      Comments: obtunded          Additional Data:     Labs:  Results from last 7 days   Lab Units 23  0442   WBC Thousand/uL 12 00*   HEMOGLOBIN g/dL 8 8*   HEMATOCRIT % 29 8*   PLATELETS Thousands/uL 221   NEUTROS PCT % 79*   LYMPHS PCT % 12*   MONOS PCT % 7   EOS PCT % 0     Results from last 7 days   Lab Units 23  0442   SODIUM mmol/L 138   POTASSIUM mmol/L 3 5   CHLORIDE mmol/L 97   CO2 mmol/L 33*   BUN mg/dL 30*   CREATININE mg/dL 2 85*   ANION GAP mmol/L 8   CALCIUM mg/dL 8 8   ALBUMIN g/dL 2 0*   TOTAL BILIRUBIN mg/dL 1 34*   ALK PHOS U/L 62   ALT U/L <6*   AST U/L 16   GLUCOSE RANDOM mg/dL 159*         Results from last 7 days   Lab Units 01/14/23  1135 01/14/23  0813   POC GLUCOSE mg/dl 158* 163*     Results from last 7 days   Lab Units 01/14/23  0552   HEMOGLOBIN A1C % 5 6           Lines/Drains:  Invasive Devices     Central Venous Catheter Line  Duration           CVC Central Lines 12/12/22 Single Right Subclavian 37 days          Drain  Duration           External Urinary Catheter Small 8 days                Central Line:  Goal for removal: Will discontinue when meds requiring line are completed  Imaging: No pertinent imaging reviewed  Recent Cultures (last 7 days):   Results from last 7 days   Lab Units 01/18/23  1526 01/13/23  0105   BLOOD CULTURE   --  No Growth After 5 Days  No Growth After 5 Days  GRAM STAIN RESULT  2+ Polys  No bacteria seen  --    BODY FLUID CULTURE, STERILE  No growth  --        Last 24 Hours Medication List:   Current Facility-Administered Medications   Medication Dose Route Frequency Provider Last Rate   • acetaminophen  650 mg Oral Q6H PRN Fernandaa Francisco DO     • glycopyrrolate  0 1 mg Intravenous Q1H PRN Jayy Carranza PA-C     • HYDROmorphone  0 5 mg/hr Intravenous Continuous Jayy Carranza PA-C     • LORazepam  1 mg Intravenous Q1H PRN Jayy Carranza PA-C     • sodium chloride (PF)  10 mL Intravenous [facility] Iona Singh DO          Today, Patient Was Seen By: Hunter Rivera PA-C    **Please Note: This note may have been constructed using a voice recognition system  **

## 2023-01-19 NOTE — OCCUPATIONAL THERAPY NOTE
Occupational Therapy         Patient Name: Rosalind Black  [id number] Date: 1/19/2023 01/19/23 0900   OT Last Visit   OT Visit Date 01/19/23   Note Type   Note Type Cancelled Session   Cancel Reasons Medical status  (currently on comfort care - will sign off)   Pain Assessment   Pain Score Med Not Given for Pain - for [facility] use only     [facility]

## 2023-01-19 NOTE — QUICK NOTE
Pt now noted be febrile 101 6 at this time now tachpneic and tachycardic   Pt with loud chest rattle - multiple attempts to relieve with suctioning with little improvement  Pt is struggling   Discussed with family at bedside ok with ativan and scopolamine

## 2023-01-19 NOTE — PHYSICAL THERAPY NOTE
Physical Therapy Cancellation Note       01/19/23 1211   Note Type   Note Type Cancelled Session   Cancel Reasons Other  (informed that pt transitioning to comfort measures, will d/c acute hospital PT orders)       Gloria Washington PTA

## 2023-01-20 NOTE — DEATH NOTE
INPATIENT DEATH NOTE  Anastasiia Mcdaniels 80 y o  male MRN: [id number]  [facility] Encounter: [id number]    Date, Time and Cause of Death    Date of Death: 23  Time of Death: 10:15 PM  Preliminary Cause of Death: Brain lesion  Entered by: Albania Torres[[id number]]     Attribution     LR1  [address], CRNP 23 22:24           Patient's Information  Pronounced by: Mirian Whitehead  Did the patient's death occur in the ED?: No  Did the patient's death occur in the OR?: No  Did the patient's death occur less than 10 days post-op?: No  Did the patient's death occur within 24 hours of admission?: No  Was code status DNR at the time of death?: Yes    PRONOUNCEMENT OF DEATH     DOA: 23    DOD: 23    TOD: 10 15pm     CODE STATUS AT TOD: level 4 comfort care     PATIENT ON HOSPICE?:  Level 4 comfort care     FAMILY NOTIFIED?: notified all family at bedside including wife Angeline Leyva , daughters Doreen Severin and [address]?: no     SUSPECTED COD: Brain lesion suspected to be abscess in setting of recent bacteremia     HOSPITAL PROBLEM LIST:   Patient Active Problem List   Diagnosis   • Chronic pain syndrome   • Bilateral lumbar radiculopathy   • Lumbar canal stenosis   • Pain in both lower extremities   • JOO (generalized anxiety disorder)   • Benign hypertension with chronic kidney disease, stage III (HCC)   • Persistent proteinuria   • Benign prostatic hyperplasia   • Allergic rhinitis   • Aneurysm of abdominal aorta   • Chronic a-fib ([address] )   • Carpal tunnel syndrome   • Chronic gout without tophus   • Centrilobular emphysema ([address] )   • Deep venous insufficiency   • Difficulty in walking   • Fecal soiling   • Fibromyalgia   • Hyperlipidemia   • Moderate or severe vision impairment, one eye   • Class 2 obesity due to excess calories without serious comorbidity with body mass index (BMI) of 36 0 to 36 9 in adult   • Smoker   • Pacemaker   • JOVI (obstructive sleep apnea)   • Urinary incontinence   • Venous insufficiency (chronic) (peripheral)   • Peripheral arteriosclerosis ([facility])   • Serum total bilirubin elevated   • Acute respiratory failure ([facility])   • SHAWNEE (acute kidney injury) ([facility] [address] )   • CAD (coronary artery disease)   • Status post primary angioplasty with coronary stent   • Hypokalemia   • Mixed simple and mucopurulent chronic bronchitis ([facility])   • Hyperuricemia   • Bilateral leg edema   • Nephrolithiasis   • Former smoker   • Symptomatic anemia   • Vitamin D insufficiency   • Chronic constipation   • Idiopathic hematuria   • Kidney stone on left side   • Flank pain   • Obesity (BMI 30-39  9)   • Dyspnea on exertion   • Neuropathy   • Coronary artery arteriosclerosis   • Essential hypertension   • Memory difficulty   • COPD (chronic obstructive pulmonary disease) ([facility])   • Chronic diastolic heart failure ([facility])   • Falls   • Closed fracture of right femur ([facility])   • Valvular heart disease   • Pressure injury of right buttock, unstageable ([facility])   • Ambulatory dysfunction   • Generalized weakness   • Depression   • Dysphagia, pharyngoesophageal phase   • Gastro-esophageal reflux disease without esophagitis   • Generalized muscle weakness   • History of falling   • Iron deficiency anemia   • Major depressive disorder, recurrent, unspecified ([facility])   • Moderate to severe aortic stenosis   • Other abnormalities of gait and mobility   • Other specified polyneuropathies   • Pulmonary hypertension due to left heart disease ([facility])   • Presence of coronary angioplasty implant and graft   • Unspecified hearing loss, unspecified ear   • Primary osteoarthritis of both knees   • Left hip pain   • Cervical strain   • Lump of skin of left lower extremity   • Elevated troponin   • Anemia   • Depression with anxiety   • MSSA bacteremia   • Abnormal CAT scan   • Influenza   • Brain lesion   • Metabolic encephalopathy   • Hyperglycemia       PRONOUNCEMENT OF DEATH    I was contacted by nursing staff to evaluate the patient found without vital signs  Patient was identified visually and identification was confirmed by hospital ID gumaro  Patient was found laying in bed with many family at bedside, including wife Nader Lisa, daughters Cheyenne Rodriguez and ALANNAH  Personally examined the patient without heart sounds auscultated on exam nor were pulses detected x 2  No breath sounds were appreciated after 2 minutes of auscultation  Pupils were fixed and non-reactive to light  Patient was pronounced dead on 23 at 10:15 pm       home is [facility]  home [facility] [address]   Phone number is [phone]  Please kindly review hospital chart for all details of this hospitalization and circumstances leading to death  Death certificate will be signed my attending physician at a later time

## 2023-01-20 NOTE — ASSESSMENT & PLAN NOTE
Patient presented with progressive confusion, generalized weakness, and decreased oral intake to [address] [place] [address] Avenue  Transfer to [facility] for neurosurgical evaluation after CT of head showed enhancing lesion within the left parasagittal parietal lobe with surrounding vasogenic edema likely abscess    • Unfortunately patient deteriorated since transfer and was transitioned to comfort measures only overnight   • Family are upset as rogelio is struggling -status postinitial transition to comfort care  • Prn medications increased, and dilaudid added via pump for constant dosing due to dyspnea and air hunger

## 2023-01-20 NOTE — DISCHARGE SUMMARY
[address] [facility]  Discharge- [facility] 1940, 80 y o  male MRN: [id number]  Unit/Bed#: [facility] 606-01 Encounter: [id number]  Primary Care Provider: Walter Howard DO   Date and time admitted to hospital: 2023 11:49 PM    * Brain lesion  Assessment & Plan  Patient presented with progressive confusion, generalized weakness, and decreased oral intake to [address] [place] [address]  Transfer to [place] for neurosurgical evaluation after CT of head showed enhancing lesion within the left parasagittal parietal lobe with surrounding vasogenic edema likely abscess    • Unfortunately patient deteriorated since transfer and was transitioned to comfort measures only overnight   • Family are upset as moose is struggling -status postinitial transition to comfort care  • Prn medications increased, and dilaudid added via pump for constant dosing due to dyspnea and air hunger     Metabolic encephalopathy  Assessment & Plan  · Likely secondary to brain lesion/abscess as noted above  · Comfort measures -now     MSSA bacteremia  Assessment & Plan  • Was on nafcillin   • No longer needed as patient transition to comfort care -now     Hypokalemia  Assessment & Plan  · No need to follow   · Comfort measures only -now     Acute respiratory failure [facility])  Assessment & Plan  · Comfort care     Hyponatremia-resolved as of 2023  Assessment & Plan  • Unclear as no further labs obtained in setting of comfort care      Medical Problems     Resolved Problems  Date Reviewed: 2023          Resolved    Hyponatremia 2023     Resolved by  Young Rod PA-C        Discharging Physician / Practitioner: NAVA Rand  PCP: Walter Howard DO  Admission Date:   Admission Orders (From admission, onward)     Ordered        23 2349  Inpatient Admission  Once                      Discharge Date: 23    Consultations During Hospital Stay:  · ID - Dr Haseeb Rodriguez and Dr Katrin Merino  · Neurosurgery Dr Gisela Grigsby  · EP Dr Hannah Saunders  · Palliative care - Dr Yuridia Ordoñez  · Nephrology - Dr Vergara Nones     Procedures Performed:   · COVID/flu/RSV 1/10/2023 -negative  · Blood cultures one 1023 x 2 show no growth at 5 days  · Blood cultures on one 1323 x 2 showed no growth after 5 days  · Fluid cultures obtained on 1/18/2023 showed no growth still preliminary  ·  body fluid  · Protein body fluid less than 2  · CT head without contrast 1/10/2023:2 cm mass in the posterior left paramedian parietal lobe with adjacent vasogenic edema appears new from prior recent examinations  Lorraine Pimentel is suspicious for intracranial infection/developing abscess given patient history although a metastatic lesion is   not excluded   Recommend follow-up contrast MRI brain with and without intravenous gadolinium contrast    No midline shift or significant associated mass effect at present  · Chest x-ray portable 1/11/2023:Mild pulmonary venous congestion with small right effusion  Enlarging left lung nodules  · CT head with and without contrast 1/11/2023:Peripherally enhancing lesion within the left parasagittal parietal lobe with surrounding vasogenic edema, similar to the examination from the day before and new since 12/15/2022  Interval development of an ill-defined enhancing lesion within the right inferior paramedian cerebellar hemisphere with mild associated edema  Differential diagnosis for the constellation of findings again remains infection with abscess formation in the left parietal lobe versus metastatic disease   Patient reportedly has a nonconditional MRI pacemaker  · CT chest abdomen pelvis with contrast 1/12/2023:1   No CT evidence of acute process within the abdomen or pelvis  2   Moderate rectal stool burden without evidence of rectal wall thickening or perirectal inflammatory changes  3   Colonic diverticulosis without evidence of acute inflammatory changes     4   Fusiform infrarenal abdominal aneurysm measuring 3 5 x 3 5 cm (AP x transverse)  · Chest portable x-ray on 2023:Left lung nodules better shown on CT  Persistent moderate right effusion and right base atelectasis  · Chest x-ray portable 2023:Right pleural effusion is similar to prior study  ·     Significant Findings / Test Results:   · See above    Incidental Findings:   · Enlarging left lung nodules     Test Results Pending at Discharge (will require follow up): · Pending body fluid culture currently preliminary with no growth     Outpatient Tests Requested:  ·     Complications: None    Reason for Admission: Worsening confusion and multiple falls with decreased oral intake    Hospital Course:   Milvia Waldrop is a 80 y o  male patient who originally presented to the hospital on 2023 due to worsening confusion and multiple falls with decreased oral intake  Patient with past medical history of permanent pacemaker, COPD, CHF, CKD presenting to the ER on 1/10/2023 due to worsening confusion noted by the wife at home with multiple falls and decreased oral intake  Patient had a recent prolonged hospitalization x2 in 2022 at [address] [place] [address] due to MSSA bacteremia with concern for possible pulmonary septic emboli on imaging  MARY was performed which was negative but could not definitely rule out small lesions due to calcifications  After discussion regarding pacemaker removal the decision was made by the family to pursue conservative approach with IV antibiotics  Patient was discharged at that time with a PICC line and plan for IV cefazolin for 6-week course  Patient was doing well but over the last 5 to 7 days prior to that admission he started to decline again  Initial noncontrast CT head showed 2 cm mass in posterior left paramedian parietal lobe with adjacent vasogenic edema    CT head with contrast was then performed showing enhancing lesion in the left parasagittal parietal lobe, as well as right inferior paramedian cerebellar hemisphere with associated edema at this time it was suspected that patient likely had brain abscess  He was evaluated by ID and neurosurgery with a concern for this brain abscess antibiotics were changed to IV nafcillin and patient was transferred to [facility] for neurosurgery/ID/EP follow-up for possible biopsy  On admission patient was noted to be more confused and disoriented  Patient was seen by neurosurgery recommending monitoring on IV antibiotics at this time and discontinuation of Xarelto for anticoagulation therapy  Due to patient's noncompatible MRI pacemaker was difficult to obtain MRI of the brain for more detailed imaging  Patient was continued on IV antibiotics repeat blood cultures remain negative on admission to [facility]  No surgical intervention on admission was recommended at that time with conservative measures preferred by family  On evening of 2023 patient was noted to be extremely tachypneic febrile with worsening condition  Discussion was eventually had with patient's daughters Albina Franklin and Lauren Tenorio who is cussed this with the mother  Aleyda  Decision was made to make patient level for comfort care and sadly patient passed on 2023 at 10:15 PM, likely secondary to brain lesion suspected to be brain abscess in the setting of prior MSSA bacteremia  Family do not prefer autopsy, and have discussed patient going to [facility]  home in [place], [phone]  Please see above list of diagnoses and related plan for additional information  Condition at Discharge:      Discharge Day Visit / Exam:   Subjective:  Pt is  apologies to family   Requested if had any needs would like to say one more good by  Vitals: No vitals appreciated   Exam:   Physical Exam  Constitutional:       Comments:     HENT:      Head: Normocephalic and atraumatic     Eyes:      Comments: Pupils non reactive    Cardiovascular:      Heart sounds: No murmur heard  No friction rub  No gallop  Comments: No heart rate or sounds noted   pulseless  Musculoskeletal:      Right lower leg: Edema present  Left lower leg: Edema present  Discussion with Family: Updated  (daughter) at bedside  both daughters and wife     Discharge instructions/Information to patient and family:   See after visit summary for information provided to patient and family  Provisions for Follow-Up Care:  See after visit summary for information related to follow-up care and any pertinent home health orders  Disposition:   Other:  home     Planned Readmission:       Discharge Statement:  I spent 65 minutes discharging the patient  This time was spent on the day of discharge  I had direct contact with the patient on the day of discharge  Greater than 50% of the total time was spent examining patient, answering all patient questions, arranging and discussing plan of care with patient as well as directly providing post-discharge instructions  Additional time then spent on discharge activities  Discharge Medications:  See after visit summary for reconciled discharge medications provided to patient and/or family        **Please Note: This note may have been constructed using a voice recognition system**

## 2023-01-21 LAB
BACTERIA SPEC BFLD CULT: NO GROWTH
GRAM STN SPEC: NORMAL
GRAM STN SPEC: NORMAL

## 2023-01-23 RX ORDER — CLOPIDOGREL BISULFATE 75 MG/1
TABLET ORAL
Qty: 90 TABLET | Refills: 1 | OUTPATIENT
Start: 2023-01-23

## 2023-02-09 NOTE — ASSESSMENT & PLAN NOTE
On recent admission noted to have MSSA bacteremia  · Cont IV Ancef 2 g every 8 hours for total of 6 weeks  · Patient underwent TTE and MARY on prior admission [FreeTextEntry3] : All medical record entries made by the Scribe were at my,  Dr. Peyman Hernandez MD., direction and personally dictated by me on 02/09/2023. I have personally reviewed the chart and agree that the record accurately reflects my personal performance of the history, physical exam, assessment and plan.

## 2023-02-17 RX ORDER — DULOXETIN HYDROCHLORIDE 60 MG/1
CAPSULE, DELAYED RELEASE ORAL
Qty: 90 CAPSULE | Refills: 1 | OUTPATIENT
Start: 2023-02-17

## 2023-03-20 DIAGNOSIS — J43.2 CENTRILOBULAR EMPHYSEMA (HCC): ICD-10-CM

## 2023-03-20 RX ORDER — IPRATROPIUM BROMIDE AND ALBUTEROL SULFATE 2.5; .5 MG/3ML; MG/3ML
SOLUTION RESPIRATORY (INHALATION)
Refills: 11 | OUTPATIENT
Start: 2023-03-20

## 2023-09-11 NOTE — PROGRESS NOTES
From: John Guzmán  To: Braulio Engel  Sent: 9/11/2023 9:51 AM CDT  Subject: ER visit       Hi,     I wanted to let you know I went to the ER last night because I thought I was having a heart attack. Turns out it was severe anxiety. I never took the Wellbutrin after being prescribed it by you. I haven’t been on anything since end of May and was taking the adderall at first but haven’t taken it much at all. You can see the ER note, but I started taking Wellbutrin about a week ago. Haven’t felt a difference yet and last night just was different than anything I’ve experienced. The doc said everything was normal heart wise but if I wanted further work up I could message you. Could I get some sort of imaging to check my chest/heart/neck? Dg recommended a CT angiogram. I feel like from my chest to my neck it’s like a pressure, like a clogged pipe type feeling in my neck. I want a peace of mind that there’s nothing going on by looking at imaging. I will continue to try the wellbutrin for now and the Xanax as needed until the Wellbutrin takes affect. Can I get a refill of the Xanax?    NEPHROLOGY PROGRESS NOTE    Nahed Jj 80 y o  male MRN: [id number]  Unit/Bed#: W -01 Encounter: [id number]  Reason for Consult:  SHAWNEE on CRI    The patient is awake and alert in bed states he finally had a bowel movement 1st 1 7 days and feels better  His daughter was present as well and they are about to play cards  Other than that no acute changes or complaints  He actually stood a little yesterday with therapy and took a few steps  ASSESSMENT/PLAN:  1  Renal    Patient had acute kidney injury creatinine has declined back to 1 3 which is his baseline  Things have been stable and baseline tends to range 1 3-1 5  At this point the patient has been placed back on his oral maintenance diuretic and is eating and urinating without difficulty  Sodium concentration is normal     Once discharged will follow-up with his primary nephrologist  Continue current diuretic dose    2  Femur fracture    Status post orthopedic procedure patient awaiting rehab placement continue with PT/OT  We will sign off please call if we can be of further assistance  SUBJECTIVE:  Review of Systems   Constitutional: Negative for chills, diaphoresis, fever and night sweats  HENT: Negative  Eyes: Negative  Cardiovascular: Negative for chest pain, dyspnea on exertion, orthopnea and palpitations  Respiratory: Negative  Negative for cough, shortness of breath, sputum production and wheezing  Gastrointestinal: Negative for abdominal pain, diarrhea, nausea and vomiting  Reports that he finally had a bowel movement 1st 1 in 7 days  Genitourinary: Negative for dysuria, flank pain, hematuria and incomplete emptying  Neurological: Negative for dizziness, focal weakness, headaches and weakness  Psychiatric/Behavioral: Negative for altered mental status, depression, hallucinations and hypervigilance         OBJECTIVE:  Current Weight: Weight - Scale: 132 kg (291 lb 0 1 oz)  Vitals:Temp (24hrs), Av 1 °F (36 7 °C), Min:97 7 °F (36 5 °C), Max:98 5 °F (36 9 °C)  Current: Temperature: 97 7 °F (36 5 °C)   Blood pressure 111/55, pulse 70, temperature 97 7 °F (36 5 °C), resp  rate 16, height 6' 1" (1 854 m), weight 132 kg (291 lb 0 1 oz), SpO2 91 %  , Body mass index is 38 39 kg/m²  Intake/Output Summary (Last 24 hours) at 2/5/2022 1126  Last data filed at 2/5/2022 0910  Gross per 24 hour   Intake 960 ml   Output 2175 ml   Net -1215 ml       Physical Exam: /55   Pulse 70   Temp 97 7 °F (36 5 °C)   Resp 16   Ht 6' 1" (1 854 m)   Wt 132 kg (291 lb 0 1 oz)   SpO2 91%   BMI 38 39 kg/m²   Physical Exam  Constitutional:       General: He is not in acute distress  Appearance: He is not toxic-appearing or diaphoretic  HENT:      Head: Normocephalic and atraumatic  Mouth/Throat:      Mouth: Mucous membranes are dry  Eyes:      General: No scleral icterus  Extraocular Movements: Extraocular movements intact  Cardiovascular:      Rate and Rhythm: Normal rate and regular rhythm  Heart sounds: Murmur heard  No friction rub  No gallop  Comments: Very mild edema  Pulmonary:      Effort: Pulmonary effort is normal  No respiratory distress  Breath sounds: Normal breath sounds  No wheezing, rhonchi or rales  Abdominal:      General: Bowel sounds are normal  There is no distension  Palpations: Abdomen is soft  Tenderness: There is abdominal tenderness  There is rebound  Musculoskeletal:      Cervical back: Normal range of motion and neck supple  Neurological:      General: No focal deficit present  Mental Status: He is alert and oriented to person, place, and time  Mental status is at baseline  Psychiatric:         Mood and Affect: Mood normal          Behavior: Behavior normal          Thought Content:  Thought content normal          Judgment: Judgment normal          Medications:    Current Facility-Administered Medications:     acetaminophen (TYLENOL) tablet 975 mg, 975 mg, Oral, Q8H [address], Meghana Menezes MD, 975 mg at 02/05/22 0506    albuterol (PROVENTIL HFA,VENTOLIN HFA) inhaler 2 puff, 2 puff, Inhalation, Q6H PRN, Meghana Menezes MD, 2 puff at 01/30/22 1136    allopurinol (ZYLOPRIM) tablet 200 mg, 200 mg, Oral, Daily, Meghana Menezes MD, 200 mg at 02/05/22 0908    ascorbic acid (VITAMIN C) tablet 500 mg, 500 mg, Oral, Daily, Meghana Menezes MD, 500 mg at 02/05/22 0909    aspirin chewable tablet 81 mg, 81 mg, Oral, Daily, NAVA Bello, 81 mg at 02/05/22 0909    bisacodyl (DULCOLAX) rectal suppository 10 mg, 10 mg, Rectal, Daily, NAVA Hernández, 10 mg at 02/05/22 0908    calcium carbonate-vitamin D (OSCAL-D) 500 mg-200 units per tablet 2 tablet, 2 tablet, Oral, HS, Meghana Menezes MD, 2 tablet at 02/04/22 2129    cholecalciferol (VITAMIN D3) tablet 1,000 Units, 1,000 Units, Oral, Daily, Meghana Menezes MD, 1,000 Units at 02/05/22 0909    cyanocobalamin (VITAMIN B-12) tablet 100 mcg, 100 mcg, Oral, Daily, Meghana Menezes MD, 100 mcg at 02/05/22 0908    DULoxetine (CYMBALTA) delayed release capsule 60 mg, 60 mg, Oral, Daily, Meghana Menezes MD, 60 mg at 02/05/22 0908    finasteride (PROSCAR) tablet 5 mg, 5 mg, Oral, Daily, Meghana Menezes MD, 5 mg at 02/05/22 0908    fluticasone-vilanterol (BREO ELLIPTA) 200-25 MCG/INH inhaler 1 puff, 1 puff, Inhalation, Daily, Meghana Menezes MD, 1 puff at 02/05/22 0910    furosemide (LASIX) tablet 20 mg, 20 mg, Oral, QPM, NAVA Will    furosemide (LASIX) tablet 40 mg, 40 mg, Oral, Daily, NAVA Barnhart, 40 mg at 02/05/22 0908    glucosamine sulfate capsule 500 mg, 500 mg, Oral, BID, Meghana Menezes MD, 500 mg at 02/05/22 0908    guaiFENesin (MUCINEX) 12 hr tablet 600 mg, 600 mg, Oral, Q12H [address], NAVA Hernández, 600 mg at 02/05/22 0908    iron sucrose (VENOFER) 200 mg in sodium chloride 0 9 % 100 mL IVPB, 200 mg, Intravenous, Daily, Homar Ames MD, Last Rate: 100 mL/hr at 02/05/22 0910, 200 mg at 02/05/22 0910    levalbuterol (Nina Brass) inhalation solution 0 63 mg, 0 63 mg, Nebulization, Q8H PRN, Dawson Rahman MD, 0 63 mg at 01/30/22 6924    levalbuterol [facility]) inhalation solution 1 25 mg, 1 25 mg, Nebulization, BID, Dawson Rahman MD, 1 25 mg at 02/05/22 0720    naloxone (NARCAN) 0 04 mg/mL syringe 0 04 mg, 0 04 mg, Intravenous, Q1MIN PRN, Dawson Rahman MD    oxyCODONE (ROXICODONE) IR tablet 2 5 mg, 2 5 mg, Oral, Q4H PRN, Dawson Rahman MD, 2 5 mg at 02/03/22 0558    oxyCODONE (ROXICODONE) IR tablet 5 mg, 5 mg, Oral, Q4H PRN, Dawson Rahman MD, 5 mg at 02/02/22 1011    polyethylene glycol (MIRALAX) packet 17 g, 17 g, Oral, Daily, Evins Hew, CRNP, 17 g at 02/05/22 0908    pravastatin (PRAVACHOL) tablet 40 mg, 40 mg, Oral, Daily With Fernando Quintana MD, 40 mg at 02/04/22 1644    predniSONE tablet 60 mg, 60 mg, Oral, Daily, Evins Hew, CRNP, 60 mg at 02/05/22 0908    rivaroxaban (XARELTO) tablet 15 mg, 15 mg, Oral, HS, Evins Hew, CRNP, 15 mg at 02/04/22 2129    senna-docusate sodium (SENOKOT S) 8 6-50 mg per tablet 1 tablet, 1 tablet, Oral, BID, Dawson Rahman MD, 1 tablet at 02/05/22 0909    sodium chloride 0 9 % inhalation solution 3 mL, 3 mL, Nebulization, BID, Dawson Rahman MD, 3 mL at 02/05/22 0720    Laboratory Results:  Lab Results   Component Value Date    WBC 10 37 (H) 02/05/2022    HGB 7 5 (L) 02/05/2022    HCT 25 2 (L) 02/05/2022    MCV 85 02/05/2022     02/05/2022     Lab Results   Component Value Date    SODIUM 136 02/05/2022    K 3 7 02/05/2022     02/05/2022    CO2 31 02/05/2022    BUN 53 (H) 02/05/2022    CREATININE 1 37 (H) 02/05/2022    GLUC 120 02/05/2022    CALCIUM 9 2 02/05/2022     Lab Results   Component Value Date    CALCIUM 9 2 02/05/2022    PHOS 4 2 (H) 11/22/2021     No results found for: LABPROT

## 2023-09-23 NOTE — ASSESSMENT & PLAN NOTE
1/2 blood culture noted to be positive for Gram-negative bacteria  Unclear source  Patient denies any  or GI symptoms except good urine output with diuretics  UA not suggestive of urinary tract infection  · Repeat blood culture negative at 48 hours, as noted above patient will have additional set of 2 blood cultures drawn in 1 week as per ID  · Urine culture showed no growth  · Patient had been on IV Rocephin during hospitalization, currently off antibiotics  · Procalcitonin low  · CT of abdomen pelvis with oral contrast performed on 09/14 as noted above 
Baseline Cr 1 2-1 5, elevated at 2 0  Suspect a degree of volume overload - cardiorenal syndrome?   Lasix 40mg IVP x 1
Baseline Cr 1 2-1 5, elevated at 2 0, decreased to 1 35  Likely cardiorenal  Creatinine improved to baseline with diuresis  Patient will follow-up with his primary care physician 1-2 weeks post discharge  Patient also follows with outpatient nephrology 
Continue CPAP
Continue CPAP q h s   Follow-up with primary care physician 1-2 weeks post discharge
History of PCI to the LADx2, RCA and circumflex,   Serial troponins  Continue plavix, metoprolol, statin
History of PCI to the LADx2, RCA and circumflex,   Serial troponins-negative  Continue plavix, metoprolol, statin
History of complete heart block post pacemaker  Continue metoprolol and xarelto
History of complete heart block post pacemaker  Continue metoprolol and xarelto
No evidence of exacerbation  Continue duonebs and breo
No evidence of exacerbation  Continue duonebs and breo
Normocytic, hemoglobin decline from 12 1-10 8 during hospitalization  No overt bleeding  Currently 10 9  Follow-up with PCP 1-2 weeks post discharge 
Oxygen saturation in the low 80s at home, high 80s on arrival to the ER    Likely secondary to PNA and history of COPD  · Continue supplemental oxygen
Oxygen saturation in the low 80s at home, high 80s on arrival to the ER  Likely secondary to CHF  Improvied after IV Lasix  Currently saturating adequately on room air  Lungs diminished the bases  Patient follows outpatient with Dr Ligia Fontanez, cardiology  Has an appointment next week with him, will keep appointment  Patient will also follow up with his primary care physician 1-2 weeks post discharge 
Patient was exerting himself on evening of admission and developed shortness of breath  He denies any fevers, cough or chills  Denies sick contacts  Patient was treated with azithromycin last week for URI, negative CXR at that time  With clinical improvement  Pneumonia suspect on presentation based on radiographic unilateral finding  Patient is afebrile with normal white count  Doubt bacterial pneumonia  · Patient improved during hospitalization  · SARS-CoV-2 PCR-negative  · Urine Legionella pneumococcal antigen negative  · Patient had received IV Rocephin during hospitalization, currently off antibiotics  · Procalcitonin low, 1 of 2 sets blood cultures showed negative rods, id was consulted  Suspect may be contaminant or transitory  Additional set of blood cultures were drawn which are negative at 48 hours  Discussed with ID, patient will have additional set of blood cultures drawn in 1 week with results to Infectious Disease  · Patient had a CT of abdomen and pelvis with oral contrast performed on 09/14 which showed a nonobstructing left renal calculus, fat containing ventral hernia and no etiology of infection as per radiology report  · Patient will be discharged home today with follow-up with his primary care physician 1-2 weeks post discharge 
Patient was exerting himself this evening and developed shortness of breath  He denies any fevers, cough or chills  He was treated with azithromycin last week for URI, negative CXR at that time  No leukocytosis  CXR revealed increased hazy airspace disease throughout the right middle and lower lung fields consistent with pneumonia or asymmetric pulmonary edema  Will obtain CT of the chest to further clarify CHF vs PNA      · Admit to medicine  · Continue rocephin and azithromycin  · Send urine for strep and legionella  · Blood cultures pending  · Send procalcitonin and trend daily  · Supportive therapies with duonebs, respiratory protocol
Wt Readings from Last 3 Encounters:   09/12/20 136 kg (299 lb 6 2 oz)   08/26/20 (!) 141 kg (310 lb)   06/10/20 (!) 141 kg (310 lb)   Patient reports that he gained 4 lbs after going out to dinner followed by breakfast the next day  He had increased edema  He was short of breath with exertion this evening  He has worsening creatinine which he has had in the past with volume overload    BNP elevated at 1900  · Admit to telemetry  · Lasix 40 mg IVP x 1  · Echo from 4/19 revealed an EF of 55%  · Daily Weight  · Intake and output  · Low sodium diet  · Serial troponins
Wt Readings from Last 3 Encounters:   09/13/20 136 kg (299 lb 2 6 oz)   08/26/20 (!) 141 kg (310 lb)   06/10/20 (!) 141 kg (310 lb)     Patient reports that he gained 4 lbs after going out to dinner followed by breakfast the next day  He had increased edema  He was short of breath with exertion evening  Clinical presentation consistent with CHF  Chest x-ray-air space disease on the right middle and lower lobe  CT scan diffuse infiltrate right more than left  Lymphadenopathy noted  Prior echocardiogram EF 55%,  Improved with IV Lasix  · telemetry-unremarkable  Discontinued  · Serial cardiac marker-negative  · Transitioned to p o  Diuretics   · Salt and fluid restriction, discussed with patient  · Cardiology evaluation appreciated; patient will continue to follow with his outpatient cardiologist Dr Kishan Cespedes; has an appointment for next week established  · Patient be discharged home today 
continue allopurinol
continue allopurinol   Patient denies any flare-up at this time 
Transfer from [facility] for a fall at a nursing home resulting in a SAH. PT with bruising, swelling and sutures to right forehead. PT is poor historian, baseline dementia. Offers no complaints.

## 2024-01-16 NOTE — PLAN OF CARE
Problem: RESPIRATORY - ADULT  Goal: Achieves optimal ventilation and oxygenation  Description: INTERVENTIONS:  - Assess for changes in respiratory status  - Assess for changes in mentation and behavior  - Position to facilitate oxygenation and minimize respiratory effort  - Oxygen administered by appropriate delivery if ordered  - Initiate smoking cessation education as indicated  - Encourage broncho-pulmonary hygiene including cough, deep breathe, Incentive Spirometry  - Assess the need for suctioning and aspirate as needed  - Assess and instruct to report SOB or any respiratory difficulty  - Respiratory Therapy support as indicated  Outcome: Progressing GERD (gastroesophageal reflux disease) GERD (gastroesophageal reflux disease) GERD (gastroesophageal reflux disease) GERD (gastroesophageal reflux disease)

## (undated) DEVICE — INTENDED FOR TISSUE SEPARATION, AND OTHER PROCEDURES THAT REQUIRE A SHARP SURGICAL BLADE TO PUNCTURE OR CUT.: Brand: BARD-PARKER SAFETY BLADES SIZE 10, STERILE

## (undated) DEVICE — INTENDED FOR TISSUE SEPARATION, AND OTHER PROCEDURES THAT REQUIRE A SHARP SURGICAL BLADE TO PUNCTURE OR CUT.: Brand: BARD-PARKER SAFETY BLADES SIZE 15, STERILE

## (undated) DEVICE — CHLORAPREP HI-LITE 26ML ORANGE

## (undated) DEVICE — PAD GROUNDING ADULT

## (undated) DEVICE — STERILE BETHLEHEM ORIF HIP PK: Brand: CARDINAL HEALTH

## (undated) DEVICE — RADIOLOGY STERILE LABELS: Brand: CENTURION

## (undated) DEVICE — COBAN 4 IN STERILE

## (undated) DEVICE — 3M™ TEGADERM™ TRANSPARENT FILM DRESSING FRAME STYLE, 1626W, 4 IN X 4-3/4 IN (10 CM X 12 CM), 50/CT 4CT/CASE: Brand: 3M™ TEGADERM™

## (undated) DEVICE — GAUZE SPONGES,16 PLY: Brand: CURITY

## (undated) DEVICE — TRAY EPIDURAL PERIFIX 20GA X 3.5IN TUOHY 8ML

## (undated) DEVICE — SUT VICRYL PLUS 2-0 CTB-1 27 IN VCPB259H

## (undated) DEVICE — GLOVE INDICATOR PI UNDERGLOVE SZ 8.5 BLUE

## (undated) DEVICE — TOWEL SET X-RAY

## (undated) DEVICE — NEEDLE BLUNT 18 G X 1 1/2 W FILTER

## (undated) DEVICE — CURITY NON-ADHERENT STRIPS: Brand: CURITY

## (undated) DEVICE — BRACHIAL PLEXUS SET

## (undated) DEVICE — 6617 IOBAN II PATIENT ISOLATION DRAPE 5/BX,4BX/CS: Brand: STERI-DRAPE™ IOBAN™ 2

## (undated) DEVICE — SMALL NEEDLE COUNTER NEST

## (undated) DEVICE — GLOVE SRG BIOGEL 7.5

## (undated) DEVICE — GLOVE SRG BIOGEL 8

## (undated) DEVICE — PAD CAST 4 IN COTTON NON STERILE

## (undated) DEVICE — CHLORAPREP APPLICATOR TINTED 10.5ML ONE-STEP

## (undated) DEVICE — SPONGE SCRUB 4 PCT CHLORHEXIDINE

## (undated) DEVICE — DRAPE C-ARMOUR

## (undated) DEVICE — PLASTIC ADHESIVE BANDAGE: Brand: CURITY

## (undated) DEVICE — 3.2MM GUIDE WIRE 400MM

## (undated) DEVICE — SUT VICRYL PLUS 1 CTB-1 36 IN VCPB947H

## (undated) DEVICE — 4.2MM THREE-FLUTED DRILL BIT QC/330MM/100MM CALIB-STERILE

## (undated) DEVICE — ARTHROSCOPY FLOOR MAT

## (undated) DEVICE — 18G X 6"(152MM)PLASTIC HUBWITH WINGS, CALIBRATIONS: Brand: TUOHY EPIDURAL NEEDLE

## (undated) DEVICE — SYRINGE 5ML LL

## (undated) DEVICE — SPONGE PVP SCRUB WING STERILE

## (undated) DEVICE — LIGHT HANDLE COVER SLEEVE DISP BLUE STELLAR

## (undated) DEVICE — ABDOMINAL PAD: Brand: DERMACEA